# Patient Record
Sex: MALE | Race: BLACK OR AFRICAN AMERICAN | NOT HISPANIC OR LATINO | Employment: OTHER | URBAN - METROPOLITAN AREA
[De-identification: names, ages, dates, MRNs, and addresses within clinical notes are randomized per-mention and may not be internally consistent; named-entity substitution may affect disease eponyms.]

---

## 2017-01-11 ENCOUNTER — LAB VISIT (OUTPATIENT)
Dept: LAB | Facility: HOSPITAL | Age: 62
End: 2017-01-11
Payer: COMMERCIAL

## 2017-01-11 DIAGNOSIS — Z79.899 ENCOUNTER FOR LONG-TERM (CURRENT) USE OF MEDICATIONS: ICD-10-CM

## 2017-01-11 DIAGNOSIS — Z94.0 KIDNEY REPLACED BY TRANSPLANT: ICD-10-CM

## 2017-01-11 LAB
ALBUMIN SERPL BCP-MCNC: 3.6 G/DL
ANION GAP SERPL CALC-SCNC: 10 MMOL/L
BASOPHILS # BLD AUTO: 0.01 K/UL
BASOPHILS NFR BLD: 0.2 %
BUN SERPL-MCNC: 17 MG/DL
CALCIUM SERPL-MCNC: 9 MG/DL
CHLORIDE SERPL-SCNC: 105 MMOL/L
CO2 SERPL-SCNC: 26 MMOL/L
CREAT SERPL-MCNC: 1.5 MG/DL
DIFFERENTIAL METHOD: ABNORMAL
EOSINOPHIL # BLD AUTO: 0.1 K/UL
EOSINOPHIL NFR BLD: 0.9 %
ERYTHROCYTE [DISTWIDTH] IN BLOOD BY AUTOMATED COUNT: 13.5 %
EST. GFR  (AFRICAN AMERICAN): 56.5 ML/MIN/1.73 M^2
EST. GFR  (NON AFRICAN AMERICAN): 48.8 ML/MIN/1.73 M^2
GLUCOSE SERPL-MCNC: 186 MG/DL
HCT VFR BLD AUTO: 43.7 %
HGB BLD-MCNC: 14 G/DL
LYMPHOCYTES # BLD AUTO: 0.9 K/UL
LYMPHOCYTES NFR BLD: 15.6 %
MAGNESIUM SERPL-MCNC: 1.5 MG/DL
MCH RBC QN AUTO: 26.8 PG
MCHC RBC AUTO-ENTMCNC: 32 %
MCV RBC AUTO: 84 FL
MONOCYTES # BLD AUTO: 0.9 K/UL
MONOCYTES NFR BLD: 15.6 %
NEUTROPHILS # BLD AUTO: 3.7 K/UL
NEUTROPHILS NFR BLD: 67 %
PHOSPHATE SERPL-MCNC: 2.7 MG/DL
PLATELET # BLD AUTO: 189 K/UL
PMV BLD AUTO: 11.7 FL
POTASSIUM SERPL-SCNC: 4 MMOL/L
RBC # BLD AUTO: 5.23 M/UL
SODIUM SERPL-SCNC: 141 MMOL/L
WBC # BLD AUTO: 5.53 K/UL

## 2017-01-11 PROCEDURE — 80197 ASSAY OF TACROLIMUS: CPT

## 2017-01-11 PROCEDURE — 80069 RENAL FUNCTION PANEL: CPT

## 2017-01-11 PROCEDURE — 83735 ASSAY OF MAGNESIUM: CPT

## 2017-01-11 PROCEDURE — 85025 COMPLETE CBC W/AUTO DIFF WBC: CPT

## 2017-01-11 PROCEDURE — 36415 COLL VENOUS BLD VENIPUNCTURE: CPT | Mod: PO

## 2017-01-12 LAB — TACROLIMUS BLD-MCNC: 7.6 NG/ML

## 2017-01-13 RX ORDER — METOPROLOL SUCCINATE 25 MG/1
TABLET, EXTENDED RELEASE ORAL
Qty: 60 TABLET | Refills: 5 | OUTPATIENT
Start: 2017-01-13

## 2017-01-18 RX ORDER — METOPROLOL SUCCINATE 25 MG/1
50 TABLET, EXTENDED RELEASE ORAL DAILY
Qty: 30 TABLET | Refills: 11 | Status: SHIPPED | OUTPATIENT
Start: 2017-01-18 | End: 2017-02-21 | Stop reason: SDUPTHER

## 2017-01-18 NOTE — TELEPHONE ENCOUNTER
----- Message from Jackie Wheatley sent at 1/18/2017 10:40 AM CST -----  Contact: daughter Marybeth  Pt jonathan Rueda would like to speak to the Dr or nurse regarding getting a prescription filled for the pt,the pharmacy is saying the office has not called them back.Rx-metoprolol....[phone]      .  1    [facility] [place] [address] [address]  Phone: [phone] Fax: [phone]

## 2017-01-19 NOTE — TELEPHONE ENCOUNTER
----- Message from Mian Gallardo sent at 1/18/2017 11:11 AM CST -----  Contact: pt's daughter  She's calling in regards to a missed call, please advise, [phone] (cell)

## 2017-01-19 NOTE — TELEPHONE ENCOUNTER
S/w pt. Pt requested a refill of Metoprolol as listed in medcard. I advised that Dr. Ham sent refill to [facility] yesterday. verbalized understanding/TGD

## 2017-01-30 ENCOUNTER — OFFICE VISIT (OUTPATIENT)
Dept: UROLOGY | Facility: CLINIC | Age: 62
End: 2017-01-30
Payer: COMMERCIAL

## 2017-01-30 VITALS — WEIGHT: 268.06 LBS | BODY MASS INDEX: 41.99 KG/M2

## 2017-01-30 DIAGNOSIS — N40.0 BENIGN PROSTATIC HYPERPLASIA, PRESENCE OF LOWER URINARY TRACT SYMPTOMS UNSPECIFIED, UNSPECIFIED MORPHOLOGY: Primary | ICD-10-CM

## 2017-01-30 LAB
BILIRUB SERPL-MCNC: NORMAL MG/DL
BLOOD URINE, POC: 50
COLOR, POC UA: YELLOW
GLUCOSE UR QL STRIP: NORMAL
KETONES UR QL STRIP: NORMAL
LEUKOCYTE ESTERASE URINE, POC: NORMAL
NITRITE, POC UA: NORMAL
PH, POC UA: 5
POC RESIDUAL URINE VOLUME: 42 ML (ref 0–100)
PROTEIN, POC: NORMAL
SPECIFIC GRAVITY, POC UA: 1.01
UROBILINOGEN, POC UA: NORMAL

## 2017-01-30 PROCEDURE — 81002 URINALYSIS NONAUTO W/O SCOPE: CPT | Mod: S$GLB,,, | Performed by: UROLOGY

## 2017-01-30 PROCEDURE — 99244 OFF/OP CNSLTJ NEW/EST MOD 40: CPT | Mod: 25,S$GLB,, | Performed by: UROLOGY

## 2017-01-30 PROCEDURE — 99999 PR PBB SHADOW E&M-EST. PATIENT-LVL II: CPT | Mod: PBBFAC,,, | Performed by: UROLOGY

## 2017-01-30 PROCEDURE — 51798 US URINE CAPACITY MEASURE: CPT | Mod: S$GLB,,, | Performed by: UROLOGY

## 2017-01-30 RX ORDER — BISACODYL 5 MG
5 TABLET, DELAYED RELEASE (ENTERIC COATED) ORAL DAILY PRN
COMMUNITY
End: 2022-03-20

## 2017-01-30 RX ORDER — TAMSULOSIN HYDROCHLORIDE 0.4 MG/1
0.4 CAPSULE ORAL DAILY
Qty: 30 CAPSULE | Refills: 11 | Status: SHIPPED | OUTPATIENT
Start: 2017-01-30 | End: 2017-03-06 | Stop reason: SDUPTHER

## 2017-01-30 NOTE — LETTER
January 30, 2017      Hany Gonsalez MD  [address] [facility] [address]           [facility]  [address] [place] [address]  [place] [address]  Phone: [phone]  Fax: [phone]          Patient: Mitch Whittaker   MR Number: [id number]   YOB: 1955   Date of Visit: 1/30/2017       Dear Dr. Hany Gonsalez:    Thank you for referring Mitch Whittaker to me for evaluation. Attached you will find relevant portions of my assessment and plan of care.    If you have questions, please do not hesitate to call me. I look forward to following Mitch Whittaker along with you.    Sincerely,    Sal Gallagher IV, MD    Enclosure  CC:  No Recipients    If you would like to receive this communication electronically, please contact [email or web address] or [phone] to request more information on [employer] access.    For providers and/or their staff who would like to refer a patient to [facility], please contact us through our one-stop-shop provider referral line, [facility] , at [phone].    If you feel you have received this communication in error or would no longer like to receive these types of communications, please e-mail [email or web address]

## 2017-01-30 NOTE — MR AVS SNAPSHOT
[facility]  [address] [facility] [address]  Phone: [phone]  Fax: [phone]                  Mitch Whittaker   2017 8:20 AM   Office Visit    Description:  Male : 1955   Provider:  Sal Gallagher IV, MD   Department:  [facility]           Diagnoses this Visit        Comments    Benign prostatic hyperplasia, presence of lower urinary tract symptoms unspecified, unspecified morphology    -  Primary            To Do List           Future Appointments        Provider Department Dept Phone    2017 8:00 AM SPECIMEN, [facility] Specimen [facility] [phone]    2017 9:40 AM LABORATORY, [facility] - Laboratory [phone]    2017 8:20 AM Cornelio Ham MD [facility] [phone]    3/6/2017 9:40 AM Sal Gallagher IV, MD [facility] [phone]      Goals (5 Years of Data)     None       These Medications        Disp Refills Start End    tamsulosin (FLOMAX) 0.4 mg Cp24 30 capsule 11 2017    Take 1 capsule (0.4 mg total) by mouth once daily. - Oral    Pharmacy: [facility] [address] #: [phone]         [facility]     [facility] Care [facility] -  Assistance  Registered nurses in the [facility] provide clinical advisement, health education, appointment booking, and other advisory services.  Call for this free service at [phone].             Medications           Message regarding Medications     Verify the changes and/or additions to your medication regime listed below are the same as discussed with your clinician today.  If any of these changes or additions are incorrect, please notify your healthcare provider.        START taking these NEW medications        Refills    tamsulosin (FLOMAX) 0.4 mg Cp24 11    Sig: Take 1 capsule (0.4 mg total) by mouth once daily.    Class: Normal    Route: Oral            Verify that the below list of medications is an accurate representation of the medications you are currently taking.  If none reported, the list may be blank. If incorrect, please contact your healthcare provider. Carry this list with you in case of emergency.           Current Medications     amlodipine (NORVASC) 2.5 MG tablet Take 1 tablet (2.5 mg total) by mouth once daily.    aspirin (ECOTRIN) 81 MG EC tablet Take 1 tablet by mouth Daily.     bisacodyl (DULCOLAX) 5 mg EC tablet Take 5 mg by mouth daily as needed for Constipation.    blood sugar diagnostic Strp 1 strip by Misc.(Non-Drug; Combo Route) route as directed. Use to check BG 2-3 times daily. Contour next strips    ergocalciferol (VITAMIN D2) 50,000 unit Cap TAKE 1 CAPSULE BY MOUTH EVERY 7 DAYS.    famotidine (PEPCID) 20 MG tablet Take 1 tablet (20 mg total) by mouth every evening.    glimepiride (AMARYL) 2 MG tablet Take 1 tablet (2 mg total) by mouth before breakfast.    insulin aspart (NOVOLOG FLEXPEN) 100 unit/mL InPn pen 15 plus: 180-230=2, 231-280=4, 281-330=6, 331-380=8, >380=10 with each meal Min15- 20 units TID    insulin glargine (LANTUS SOLOSTAR) 100 unit/mL (3 mL) InPn pen Inject 20 Units into the skin 2 (two) times daily.    ketoconazole (NIZORAL) 200 mg Tab TAKE 1/2 TABLET BY MOUTH ONCE DAILY    lancets 33 gauge Misc 1 Stick by Misc.(Non-Drug; Combo Route) route as directed. Contour lancets. Use to check BG 2-3 times daily.    magnesium oxide (MAG-OX) 400 mg tablet Take 2 tablets (800 mg total) by mouth 2 (two) times daily.    metoprolol succinate (TOPROL-XL) 25 MG 24 hr tablet Take 2 tablets (50 mg total) by mouth once daily.    multivitamin (THERAGRAN) tablet Take 1 tablet by mouth once daily.    mycophenolate (CELLCEPT) 250 mg Cap Take 3 capsules (750 mg total) by mouth 2 (two) times daily.    omega-3 fatty acids-vitamin E (FISH OIL) 1,000 mg Cap Take 1 capsule by mouth once daily.     pen needle, diabetic (BD INSULIN PEN NEEDLE UF  "SHORT) 31 gauge x 5/16" Ndle USE TO INJECT INSULIN TWICE A DAY    predniSONE (DELTASONE) 5 MG tablet Take 1 tablet (5 mg total) by mouth once daily. Z94.0    rosuvastatin (CRESTOR) 20 MG tablet Take 1 tablet (20 mg total) by mouth every evening.    tacrolimus (PROGRAF) 1 MG Cap 5mg in AM 4mg in PMKIDNEY TRANSPLANT 6-12-15 z94.0    tamsulosin (FLOMAX) 0.4 mg Cp24 Take 1 capsule (0.4 mg total) by mouth once daily.           Clinical Reference Information           Vital Signs - Last Recorded  Most recent update: 1/30/2017  8:49 AM by Michelle Milligan LPN    Wt BMI             121.6 kg (268 lb 1.3 oz) 41.99 kg/m2         Allergies as of 1/30/2017     Lisinopril    Actos  [Pioglitazone]    Metformin      Immunizations Administered on Date of Encounter - 1/30/2017     None      Orders Placed During Today's Visit      Normal Orders This Visit    POCT urine dipstick without microscope       Maintenance Dialysis History     Start End Type Comments Center    7/9/2013  [facility]            Current Dialysis Center Information     [facility] [address] Phone #:  [phone]    Contact:  N/A [place]Z  [address] Fax #:  [phone]            Transplant Information        Txp Date Organ Coordinator Care Team    6/12/2015 Kidney Sean Lobato RN Referring Physician:  Bucky Danielle MD   Current Nephrologist:  Bucky Danielle MD   Surgeon:  Carlota Connors MD   Assisting Surgeon:  Magda Pereira MD         "

## 2017-01-30 NOTE — PROGRESS NOTES
Chief Complaint: BPH?    HPI:   1/30/17: 62 yo man had a kidney transplant 6/15 and the stent was removed 3 weeks later; he was told his bladder was really full.  No abd/pelvic pain and no exac/rel factors.  No hematuria.  No urolithiasis.  Weak stream, no hesitancy, doesn't feel he empties all the way, nocturia x8-9.  Is supposed to use a CPAP but it got flooded out.  No other urinary bother. Nocturia x4-5 on CPAP; drinks a lot of water including in the evening.  Referred by Dr. Gonsalez.    Allergies:  Lisinopril; Actos  [pioglitazone]; and Metformin    Medications:  has a current medication list which includes the following prescription(s): amlodipine, aspirin, bisacodyl, blood sugar diagnostic, ergocalciferol, famotidine, glimepiride, insulin aspart, insulin glargine, ketoconazole, lancets, magnesium oxide, metoprolol succinate, multivitamin, mycophenolate, omega-3 fatty acids-vitamin e, pen needle, diabetic, prednisone, rosuvastatin, tacrolimus, and humalog kwikpen.    Review of Systems:  General: No fever, chills, fatigability, or weight loss.  Skin: No rashes, itching, or changes in color or texture of skin.  Chest: Denies STEELE, cyanosis, wheezing, cough, and sputum production.  Abdomen: Appetite fine. No weight loss. Denies diarrhea, abdominal pain, hematemesis, or blood in stool.  Musculoskeletal: No joint stiffness or swelling. Denies back pain.  : As above.  All other review of systems negative.    PMH:   has a past medical history of Acquired renal cyst of left kidney; Anemia associated with chronic renal failure; CAD (coronary artery disease); CHF (congestive heart failure); Chronic immunosuppression with Prograf and MMF (6/18/2015); CKD (chronic kidney disease) stage 3, GFR 30-59 ml/min; Diabetic retinopathy; DM (diabetes mellitus), type 2 with complications (1994); Edema; End stage kidney disease; False positive serological test for hepatitis C; Gallbladder polyp; Heart failure, diastolic, due to HTN;  Hemodialysis status; HPTH (hyperparathyroidism); Hyperlipidemia; Hypertension associated with stage 3 chronic kidney disease due to type 2 diabetes mellitus; Obesity; Proteinuria; S/P kidney transplant; Type 2 diabetes, uncontrolled, with retinopathy; and Type II diabetes mellitus with renal manifestations.    PSH:   has a past surgical history that includes Retinal laser procedure; Cardiac catheterization (2008); and Kidney transplant.    FamHx: family history includes Diabetes in his maternal grandmother, mother, and sister; Heart failure in his mother; Hypertension in his mother; Kidney disease in his sister. There is no history of Cancer.    SocHx:  reports that he has never smoked. He quit smokeless tobacco use about 3 years ago. He reports that he uses illicit drugs, including Marijuana, about twice per week. He reports that he does not drink alcohol.      Physical Exam:  There were no vitals filed for this visit.  General: A&Ox3, no apparent distress, no deformities  Neck: No masses, normal thyroid  Lungs: normal inspiration, no use of accessory muscles  Heart: normal pulse, no arrhythmias  Abdomen: Soft, NT, ND, no masses, no hernias, no hepatosplenomegaly  Lymphatic: Neck and groin nodes negative  Skin: The skin is warm and dry. No jaundice.  Ext: No c/c/e.  : Test desc jamie, no abnormalities of epididymus. Penis normal, with normal penile and scrotal skin. Meatus normal. Normal rectal tone, no hemorrhoids. Prost 40 gm no nodules or masses appreciated. SV not palpable. Perineum and anus normal.    Labs/Studies:   Urinalysis performed in clinic, summary: UA normal exc 50 glucose  Bladder Scan performed in office: PVR 42 ml.  PSA    12/16: 0.26    Impression/Plan:   1. Has symptomatic BPH.  Flomax and RTC 1 mo for PVR.

## 2017-02-13 DIAGNOSIS — Z94.0 KIDNEY REPLACED BY TRANSPLANT: Primary | ICD-10-CM

## 2017-02-13 RX ORDER — KETOCONAZOLE 200 MG/1
100 TABLET ORAL DAILY
Qty: 15 TABLET | Refills: 11 | Status: SHIPPED | OUTPATIENT
Start: 2017-02-13 | End: 2018-02-07 | Stop reason: SDUPTHER

## 2017-02-21 ENCOUNTER — TELEPHONE (OUTPATIENT)
Dept: INTERNAL MEDICINE | Facility: CLINIC | Age: 62
End: 2017-02-21

## 2017-02-21 RX ORDER — METOPROLOL SUCCINATE 25 MG/1
50 TABLET, EXTENDED RELEASE ORAL DAILY
Qty: 60 TABLET | Refills: 1 | Status: SHIPPED | OUTPATIENT
Start: 2017-02-21 | End: 2017-04-23 | Stop reason: SDUPTHER

## 2017-02-21 NOTE — TELEPHONE ENCOUNTER
----- Message from Andrew Cosby, PharmD sent at 2/21/2017  4:47 PM CST -----  Pt is requesting that his toprol xl 25 mg be sent in with a quantity of 60, so that the rx will last him a month.     Thanks,    Andrew

## 2017-02-24 NOTE — TELEPHONE ENCOUNTER
Called pt.  Informed him of medication refill.  That an appointment is needed.  Pt voiced understanding and scheduled lab and follow up appointment with MD.

## 2017-02-27 ENCOUNTER — LAB VISIT (OUTPATIENT)
Dept: LAB | Facility: HOSPITAL | Age: 62
End: 2017-02-27
Attending: INTERNAL MEDICINE
Payer: COMMERCIAL

## 2017-02-27 DIAGNOSIS — E11.22 TYPE 2 DIABETES MELLITUS WITH CHRONIC KIDNEY DISEASE, WITH LONG-TERM CURRENT USE OF INSULIN, UNSPECIFIED CKD STAGE: ICD-10-CM

## 2017-02-27 DIAGNOSIS — Z12.5 SCREENING FOR PROSTATE CANCER: ICD-10-CM

## 2017-02-27 DIAGNOSIS — Z79.4 TYPE 2 DIABETES MELLITUS WITH CHRONIC KIDNEY DISEASE, WITH LONG-TERM CURRENT USE OF INSULIN, UNSPECIFIED CKD STAGE: ICD-10-CM

## 2017-02-27 LAB
ALBUMIN SERPL BCP-MCNC: 3.6 G/DL
ALP SERPL-CCNC: 83 U/L
ALT SERPL W/O P-5'-P-CCNC: 24 U/L
ANION GAP SERPL CALC-SCNC: 11 MMOL/L
AST SERPL-CCNC: 20 U/L
BASOPHILS # BLD AUTO: 0.01 K/UL
BASOPHILS NFR BLD: 0.2 %
BILIRUB SERPL-MCNC: 0.6 MG/DL
BUN SERPL-MCNC: 18 MG/DL
CALCIUM SERPL-MCNC: 9.2 MG/DL
CHLORIDE SERPL-SCNC: 104 MMOL/L
CHOLEST/HDLC SERPL: 4 {RATIO}
CO2 SERPL-SCNC: 25 MMOL/L
COMPLEXED PSA SERPL-MCNC: 0.38 NG/ML
CREAT SERPL-MCNC: 1.7 MG/DL
DIFFERENTIAL METHOD: ABNORMAL
EOSINOPHIL # BLD AUTO: 0 K/UL
EOSINOPHIL NFR BLD: 0.8 %
ERYTHROCYTE [DISTWIDTH] IN BLOOD BY AUTOMATED COUNT: 13.5 %
EST. GFR  (AFRICAN AMERICAN): 48.5 ML/MIN/1.73 M^2
EST. GFR  (NON AFRICAN AMERICAN): 42 ML/MIN/1.73 M^2
GLUCOSE SERPL-MCNC: 215 MG/DL
HCT VFR BLD AUTO: 43.7 %
HDL/CHOLESTEROL RATIO: 24.8 %
HDLC SERPL-MCNC: 210 MG/DL
HDLC SERPL-MCNC: 52 MG/DL
HGB BLD-MCNC: 14.1 G/DL
LDLC SERPL CALC-MCNC: 120.8 MG/DL
LYMPHOCYTES # BLD AUTO: 0.9 K/UL
LYMPHOCYTES NFR BLD: 18.6 %
MCH RBC QN AUTO: 26.7 PG
MCHC RBC AUTO-ENTMCNC: 32.3 %
MCV RBC AUTO: 83 FL
MONOCYTES # BLD AUTO: 0.7 K/UL
MONOCYTES NFR BLD: 14 %
NEUTROPHILS # BLD AUTO: 3.3 K/UL
NEUTROPHILS NFR BLD: 65.4 %
NONHDLC SERPL-MCNC: 158 MG/DL
PLATELET # BLD AUTO: 180 K/UL
PMV BLD AUTO: 11.5 FL
POTASSIUM SERPL-SCNC: 4 MMOL/L
PROT SERPL-MCNC: 6.8 G/DL
RBC # BLD AUTO: 5.29 M/UL
SODIUM SERPL-SCNC: 140 MMOL/L
TRIGL SERPL-MCNC: 186 MG/DL
TSH SERPL DL<=0.005 MIU/L-ACNC: 0.91 UIU/ML
WBC # BLD AUTO: 5.06 K/UL

## 2017-02-27 PROCEDURE — 84443 ASSAY THYROID STIM HORMONE: CPT

## 2017-02-27 PROCEDURE — 36415 COLL VENOUS BLD VENIPUNCTURE: CPT | Mod: PO

## 2017-02-27 PROCEDURE — 80053 COMPREHEN METABOLIC PANEL: CPT

## 2017-02-27 PROCEDURE — 80061 LIPID PANEL: CPT

## 2017-02-27 PROCEDURE — 84153 ASSAY OF PSA TOTAL: CPT

## 2017-02-27 PROCEDURE — 85025 COMPLETE CBC W/AUTO DIFF WBC: CPT

## 2017-03-01 RX ORDER — FAMOTIDINE 20 MG/1
20 TABLET, FILM COATED ORAL NIGHTLY
Qty: 30 TABLET | Refills: 11 | Status: SHIPPED | OUTPATIENT
Start: 2017-03-01 | End: 2018-03-02 | Stop reason: SDUPTHER

## 2017-03-01 NOTE — TELEPHONE ENCOUNTER
----- Message from Andrew Cosby, PharmD sent at 3/1/2017  9:45 AM CST -----  Pt is requesting a new rx for his famotidine 20 mg.    Thanks,    Andrew

## 2017-03-03 ENCOUNTER — OFFICE VISIT (OUTPATIENT)
Dept: INTERNAL MEDICINE | Facility: CLINIC | Age: 62
End: 2017-03-03
Payer: COMMERCIAL

## 2017-03-03 VITALS
SYSTOLIC BLOOD PRESSURE: 136 MMHG | OXYGEN SATURATION: 97 % | HEIGHT: 67 IN | DIASTOLIC BLOOD PRESSURE: 76 MMHG | BODY MASS INDEX: 42.93 KG/M2 | WEIGHT: 273.56 LBS | HEART RATE: 88 BPM | TEMPERATURE: 98 F

## 2017-03-03 DIAGNOSIS — I25.10 CORONARY ARTERY DISEASE INVOLVING NATIVE CORONARY ARTERY OF NATIVE HEART WITHOUT ANGINA PECTORIS: ICD-10-CM

## 2017-03-03 DIAGNOSIS — E11.42 DIABETIC PERIPHERAL NEUROPATHY: ICD-10-CM

## 2017-03-03 DIAGNOSIS — N18.9 ANEMIA ASSOCIATED WITH CHRONIC RENAL FAILURE: ICD-10-CM

## 2017-03-03 DIAGNOSIS — N18.30 HYPERTENSION ASSOCIATED WITH STAGE 3 CHRONIC KIDNEY DISEASE DUE TO TYPE 2 DIABETES MELLITUS: ICD-10-CM

## 2017-03-03 DIAGNOSIS — E11.21 TYPE 2 DIABETES MELLITUS WITH DIABETIC NEPHROPATHY, UNSPECIFIED LONG TERM INSULIN USE STATUS: ICD-10-CM

## 2017-03-03 DIAGNOSIS — Z00.00 ROUTINE GENERAL MEDICAL EXAMINATION AT A HEALTH CARE FACILITY: Primary | ICD-10-CM

## 2017-03-03 DIAGNOSIS — G47.33 OBSTRUCTIVE SLEEP APNEA SYNDROME: ICD-10-CM

## 2017-03-03 DIAGNOSIS — N25.81 SECONDARY HYPERPARATHYROIDISM OF RENAL ORIGIN: ICD-10-CM

## 2017-03-03 DIAGNOSIS — E11.22 HYPERTENSION ASSOCIATED WITH STAGE 3 CHRONIC KIDNEY DISEASE DUE TO TYPE 2 DIABETES MELLITUS: ICD-10-CM

## 2017-03-03 DIAGNOSIS — E11.21 TYPE II DIABETES MELLITUS WITH NEPHROPATHY: ICD-10-CM

## 2017-03-03 DIAGNOSIS — R76.8 HEPATITIS C ANTIBODY TEST POSITIVE: ICD-10-CM

## 2017-03-03 DIAGNOSIS — E11.8 TYPE 2 DIABETES MELLITUS WITH COMPLICATION, WITH LONG-TERM CURRENT USE OF INSULIN: ICD-10-CM

## 2017-03-03 DIAGNOSIS — I12.9 HYPERTENSION ASSOCIATED WITH STAGE 3 CHRONIC KIDNEY DISEASE DUE TO TYPE 2 DIABETES MELLITUS: ICD-10-CM

## 2017-03-03 DIAGNOSIS — R76.8 HEPATITIS C ANTIBODY POSITIVE IN BLOOD: ICD-10-CM

## 2017-03-03 DIAGNOSIS — Z79.4 TYPE 2 DIABETES MELLITUS WITH COMPLICATION, WITH LONG-TERM CURRENT USE OF INSULIN: ICD-10-CM

## 2017-03-03 DIAGNOSIS — Z94.0 KIDNEY TRANSPLANT STATUS, LIVING RELATED DONOR: Chronic | ICD-10-CM

## 2017-03-03 DIAGNOSIS — N18.30 CKD (CHRONIC KIDNEY DISEASE) STAGE 3, GFR 30-59 ML/MIN: ICD-10-CM

## 2017-03-03 DIAGNOSIS — D63.1 ANEMIA ASSOCIATED WITH CHRONIC RENAL FAILURE: ICD-10-CM

## 2017-03-03 PROCEDURE — 99396 PREV VISIT EST AGE 40-64: CPT | Mod: S$GLB,,, | Performed by: INTERNAL MEDICINE

## 2017-03-03 PROCEDURE — 3078F DIAST BP <80 MM HG: CPT | Mod: S$GLB,,, | Performed by: INTERNAL MEDICINE

## 2017-03-03 PROCEDURE — 99999 PR PBB SHADOW E&M-EST. PATIENT-LVL III: CPT | Mod: PBBFAC,,, | Performed by: INTERNAL MEDICINE

## 2017-03-03 PROCEDURE — 3075F SYST BP GE 130 - 139MM HG: CPT | Mod: S$GLB,,, | Performed by: INTERNAL MEDICINE

## 2017-03-03 RX ORDER — INSULIN GLARGINE 100 [IU]/ML
30 INJECTION, SOLUTION SUBCUTANEOUS 2 TIMES DAILY
Qty: 2 BOX | Refills: 11 | Status: SHIPPED | OUTPATIENT
Start: 2017-03-03 | End: 2017-05-19 | Stop reason: SDUPTHER

## 2017-03-03 RX ORDER — ROSUVASTATIN CALCIUM 40 MG/1
40 TABLET, COATED ORAL NIGHTLY
Qty: 90 TABLET | Refills: 3 | Status: SHIPPED | OUTPATIENT
Start: 2017-03-03 | End: 2018-03-02 | Stop reason: SDUPTHER

## 2017-03-03 RX ORDER — INSULIN ASPART 100 [IU]/ML
INJECTION, SOLUTION INTRAVENOUS; SUBCUTANEOUS
Qty: 1 BOX | Refills: 11 | Status: SHIPPED | OUTPATIENT
Start: 2017-03-03 | End: 2017-04-25 | Stop reason: SDUPTHER

## 2017-03-03 NOTE — PROGRESS NOTES
HPI:  Patient is a 61-year-old man who comes in today for follow-up his diabetes, hypertension, lipids, and chronic kidney disease as well as for his annual physical exam.  He states his sugars have been doing well.  He denies any hypoglycemic problems.  Unfortunately, his lab work, says otherwise.  His hemoglobin A1c and royer rocketed to 10.2.  He denies any other problems or complaints.  His blood pressures been well-controlled.  He denies any chest pains or shortness of breath.      Current MEDS: medcard review, verified and update  Allergies: Per the electronic medical record    Past Medical History:   Diagnosis Date    Acquired renal cyst of left kidney     Anemia associated with chronic renal failure     CAD (coronary artery disease)     nonobstructive lhc 9/14    CHF (congestive heart failure)     Chronic immunosuppression with Prograf and MMF 6/18/2015    CKD (chronic kidney disease) stage 3, GFR 30-59 ml/min     Diabetic retinopathy     DM (diabetes mellitus), type 2 with complications 1994    Edema     End stage kidney disease     s/p transplant, doing well    Gallbladder polyp     Heart failure, diastolic, due to HTN     Hemodialysis status     off since transplant    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     HPTH (hyperparathyroidism)     Hyperlipidemia     Hypertension associated with stage 3 chronic kidney disease due to type 2 diabetes mellitus     Obesity     Proteinuria     resolved s/p transplant    S/P kidney transplant     Type 2 diabetes, uncontrolled, with retinopathy     Type II diabetes mellitus with renal manifestations        Past Surgical History:   Procedure Laterality Date    CARDIAC CATHETERIZATION  2008    normal coronary    KIDNEY TRANSPLANT      RETINAL LASER PROCEDURE         SHx: per the electronic medical record    FHx: recorded in the electronic medical record    ROS:    denies any chest pains or shortness of breath.  "Denies any nausea, vomiting or diarrhea. Denies any fever, chills or sweats. Denies any change in weight, voice, stool, skin or hair. Denies any dysuria, dyspepsia or dysphagia. Denies any change in vision, hearing or headaches. Denies any swollen lymph nodes or loss of memory.    PE:  /76  Pulse 88  Temp 97.7 °F (36.5 °C)  Ht 5' 7" (1.702 m)  Wt 124.1 kg (273 lb 9.5 oz)  SpO2 97%  BMI 42.85 kg/m2  Gen: Well-developed, well-nourished, male, in no acute distress, oriented x3  HEENT: neck is supple, no adenopathy, carotids 2+ equal without bruits, thyroid exam normal size without nodules.  CHEST: clear to auscultation and percussion  CVS: regular rate and rhythm without significant murmur, gallop, or rubs  ABD: soft, benign, no rebound no guarding, no distention.  Bowel sounds are normal.     nontender.  No palpable masses.  No organomegaly and no audible bruits.  RECTAL: Deferred  EXT: no clubbing, cyanosis, or edema  LYMPH: no cervical, inguinal, or axillary adenopathy  FEET: no loss of sensation.  No ulcers or pressure sores.  Monofilament testing normal  NEURO: gait normal.  Cranial nerves II- XII intact. No nystagmus.  Speech normal.   Gross motor and sensory unremarkable.    Lab Results   Component Value Date    WBC 5.06 02/27/2017    HGB 14.1 02/27/2017    HCT 43.7 02/27/2017     02/27/2017    CHOL 210 (H) 02/27/2017    TRIG 186 (H) 02/27/2017    HDL 52 02/27/2017    ALT 24 02/27/2017    AST 20 02/27/2017     02/27/2017    K 4.0 02/27/2017     02/27/2017    CREATININE 1.7 (H) 02/27/2017    BUN 18 02/27/2017    CO2 25 02/27/2017    TSH 0.911 02/27/2017    PSA 0.38 02/27/2017    INR 1.0 06/18/2015    HGBA1C 10.2 (H) 02/27/2017       Impression:  Diabetes, very poorly controlled  Hyperlipidemia, not to goal  Patient Active Problem List   Diagnosis    Anemia associated with chronic renal failure    Obesity    Acquired renal cyst of left kidney    Nephrolithiasis    Sleep apnea "    Proliferative diabetic retinopathy(362.02)    Pseudophakia    CAD (coronary artery disease)    Living-related donor kidney transplant (daughter) - 6/12/15    Diabetic peripheral neuropathy    Chronic immunosuppression with Prograf, MMF and prednisone    Secondary hyperparathyroidism of renal origin    CKD (chronic kidney disease) stage 3, GFR 30-59 ml/min    Type II diabetes mellitus with renal manifestations    Type 2 diabetes, uncontrolled, with retinopathy    Hypertension associated with stage 3 chronic kidney disease due to type 2 diabetes mellitus    Right sided abdominal pain    Constipation, unspecified    Nocturia    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    DM (diabetes mellitus), type 2 with complications       Plan:   Orders Placed This Encounter    Hemoglobin A1c    Lipid panel    Basic metabolic panel    Ambulatory Referral to Ophthalmology    insulin glargine (LANTUS SOLOSTAR) 100 unit/mL (3 mL) InPn pen    insulin aspart (NOVOLOG FLEXPEN) 100 unit/mL InPn pen    rosuvastatin (CRESTOR) 40 MG Tab     He will increase his Lantus insulin to 30 units twice a day.  He will take his NovoLog insulin with each meal per sliding scale.  The patient has been skipping even mealtime insulin whenever his sugar was in the normal range.  I explained to him  He still has to give the 15 units even when his sugar is normal.  If his sugars less than 70.  He may eat first, but still get the 15 units.  He will see me in 3 months with the above lab work.  He is due to see an eye exam

## 2017-03-03 NOTE — MR AVS SNAPSHOT
[facility] - [facility]  [address] [place] [address]  Phone: [phone]                  Mitch Whittaker   3/3/2017 9:20 AM   Office Visit    Description:  Male : 1955   Provider:  Cornelio Ham MD   Department:  [facility]           Reason for Visit     6 month follow up           Diagnoses this Visit        Comments    Routine general medical examination at a health care facility    -  Primary     Diabetic peripheral neuropathy         Hypertension associated with stage 3 chronic kidney disease due to type 2 diabetes mellitus         Type 2 diabetes mellitus with diabetic nephropathy, unspecified long term insulin use status         Kidney transplant status, living related donor         Coronary artery disease involving native coronary artery of native heart without angina pectoris         CKD (chronic kidney disease) stage 3, GFR 30-59 ml/min         Secondary hyperparathyroidism of renal origin         Anemia associated with chronic renal failure         Obstructive sleep apnea syndrome         Hepatitis C antibody test positive         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         Type 2 diabetes mellitus with complication, with long-term current use of insulin         Type II diabetes mellitus with nephropathy                To Do List           Future Appointments        Provider Department Dept Phone    3/6/2017 9:40 AM Sal Gallagher IV, MD [facility] - Urology [phone]    2017 8:30 AM LABORATORY, [facility] - Laboratory [phone]      Goals (5 Years of Data)     None      Follow-Up and Disposition     Return in about 3 months (around 6/3/2017).       These Medications        Disp Refills Start End    insulin glargine (LANTUS SOLOSTAR) 100 unit/mL (3 mL) InPn pen 2 Box 11 3/3/2017     Inject 30 Units into the skin 2 (two) times daily. - Subcutaneous    Pharmacy: [facility] - [place], [place] - [address]  [facility] Ph #: [phone]       insulin aspart (NOVOLOG FLEXPEN) 100 unit/mL InPn pen 1 Box 11 3/3/2017     15 plus: 180-230=2, 231-280=4, 281-330=6, 331-380=8, >380=10 with each meal Min15- 20 units TID    Pharmacy: [facility] - [place], [place] - [address] [facility] Ph #: [phone]       rosuvastatin (CRESTOR) 40 MG Tab 90 tablet 3 3/3/2017 3/3/2018    Take 1 tablet (40 mg total) by mouth every evening. - Oral    Pharmacy: [facility] - [place], [place] - [address] [facility] Ph #: [phone]         [facility] On Call     [facility] On [facility] Nurse Care Line - 24/7 Assistance  Registered nurses in the [facility] Center provide clinical advisement, health education, appointment booking, and other advisory services.  Call for this free service at [phone].             Medications           Message regarding Medications     Verify the changes and/or additions to your medication regime listed below are the same as discussed with your clinician today.  If any of these changes or additions are incorrect, please notify your healthcare provider.        START taking these NEW medications        Refills    rosuvastatin (CRESTOR) 40 MG Tab 3    Sig: Take 1 tablet (40 mg total) by mouth every evening.    Class: Normal    Route: Oral      CHANGE how you are taking these medications     Start Taking Instead of    insulin glargine (LANTUS SOLOSTAR) 100 unit/mL (3 mL) InPn pen insulin glargine (LANTUS SOLOSTAR) 100 unit/mL (3 mL) InPn pen    Dosage:  Inject 30 Units into the skin 2 (two) times daily. Dosage:  Inject 20 Units into the skin 2 (two) times daily.    Reason for Change:  Reorder            Verify that the below list of medications is an accurate representation of the medications you are currently taking.  If none reported, the list may be blank. If incorrect, please contact your healthcare provider. Carry this list with you in case of emergency.     "       Current Medications     amlodipine (NORVASC) 2.5 MG tablet Take 1 tablet (2.5 mg total) by mouth once daily.    aspirin (ECOTRIN) 81 MG EC tablet Take 1 tablet by mouth Daily.     bisacodyl (DULCOLAX) 5 mg EC tablet Take 5 mg by mouth daily as needed for Constipation.    blood sugar diagnostic Strp 1 strip by Misc.(Non-Drug; Combo Route) route as directed. Use to check BG 2-3 times daily. Contour next strips    ergocalciferol (VITAMIN D2) 50,000 unit Cap TAKE 1 CAPSULE BY MOUTH EVERY 7 DAYS.    famotidine (PEPCID) 20 MG tablet Take 1 tablet (20 mg total) by mouth every evening.    glimepiride (AMARYL) 2 MG tablet Take 1 tablet (2 mg total) by mouth before breakfast.    insulin aspart (NOVOLOG FLEXPEN) 100 unit/mL InPn pen 15 plus: 180-230=2, 231-280=4, 281-330=6, 331-380=8, >380=10 with each meal Min15- 20 units TID    insulin glargine (LANTUS SOLOSTAR) 100 unit/mL (3 mL) InPn pen Inject 30 Units into the skin 2 (two) times daily.    ketoconazole (NIZORAL) 200 mg Tab Take 0.5 tablets (100 mg total) by mouth once daily.    lancets 33 gauge Misc 1 Stick by Misc.(Non-Drug; Combo Route) route as directed. Contour lancets. Use to check BG 2-3 times daily.    magnesium oxide (MAG-OX) 400 mg tablet Take 2 tablets (800 mg total) by mouth 2 (two) times daily.    metoprolol succinate (TOPROL-XL) 25 MG 24 hr tablet Take 2 tablets (50 mg total) by mouth once daily.    multivitamin (THERAGRAN) tablet Take 1 tablet by mouth once daily.    mycophenolate (CELLCEPT) 250 mg Cap Take 3 capsules (750 mg total) by mouth 2 (two) times daily.    omega-3 fatty acids-vitamin E (FISH OIL) 1,000 mg Cap Take 1 capsule by mouth once daily.     pen needle, diabetic (BD INSULIN PEN NEEDLE UF SHORT) 31 gauge x 5/16" Ndle USE TO INJECT INSULIN TWICE A DAY    predniSONE (DELTASONE) 5 MG tablet Take 1 tablet (5 mg total) by mouth once daily. Z94.0    tacrolimus (PROGRAF) 1 MG Cap 5mg in AM 4mg in PMKIDNEY TRANSPLANT 6-12-15 z94.0    tamsulosin " "(FLOMAX) 0.4 mg Cp24 Take 1 capsule (0.4 mg total) by mouth once daily.    rosuvastatin (CRESTOR) 40 MG Tab Take 1 tablet (40 mg total) by mouth every evening.           Clinical Reference Information           Your Vitals Were     BP Pulse Temp Height Weight SpO2    136/76 88 97.7 °F (36.5 °C) 5' 7" (1.702 m) 124.1 kg (273 lb 9.5 oz) 97%    BMI                42.85 kg/m2          Blood Pressure          Most Recent Value    BP  136/76      Allergies as of 3/3/2017     Lisinopril    Actos  [Pioglitazone]    Metformin      Immunizations Administered on Date of Encounter - 3/3/2017     None      Orders Placed During Today's Visit      Normal Orders This Visit    Ambulatory Referral to Ophthalmology     Future Labs/Procedures Expected by Expires    Basic metabolic panel  6/1/2017 (Approximate) 5/2/2018    Hemoglobin A1c  6/1/2017 (Approximate) 5/2/2018    Lipid panel  6/1/2017 (Approximate) 5/2/2018      Maintenance Dialysis History     Start End Type Comments Center    7/9/2013  [facility]            Current Dialysis Center Information     [facility] [address] [facility] Phone #:  [phone]    Contact:  [place]  [address] Fax #:  [phone]            Transplant Information        Txp Date Organ Coordinator Care Team    6/12/2015 Kidney Sean Lobato RN Referring Physician:  Bucky Danielle MD   Current Nephrologist:  Bucky Danielle MD   Surgeon:  Carlota Connors MD   Assisting Surgeon:  Magda Pereira MD         Language Assistance Services     ATTENTION: Language assistance services are available, free of charge. Please call [phone].      ATENCIÓN: Si habla español, tiene a schumacher disposición servicios gratuitos de asistencia lingüística. Llame al [phone].     CHÚ Ý: N?u b?n nói Ti?ng Vi?t, có các d?ch v? h? tr? ngôn ng? mi?n phí dành cho b?n. G?i s? [phone].         [facility] complies with applicable Federal civil rights laws and does not " discriminate on the basis of race, color, national origin, age, disability, or sex.

## 2017-03-06 ENCOUNTER — OFFICE VISIT (OUTPATIENT)
Dept: UROLOGY | Facility: CLINIC | Age: 62
End: 2017-03-06
Payer: COMMERCIAL

## 2017-03-06 VITALS — HEIGHT: 67 IN | BODY MASS INDEX: 42.57 KG/M2 | WEIGHT: 271.25 LBS

## 2017-03-06 DIAGNOSIS — N40.0 BENIGN PROSTATIC HYPERPLASIA, PRESENCE OF LOWER URINARY TRACT SYMPTOMS UNSPECIFIED, UNSPECIFIED MORPHOLOGY: Primary | ICD-10-CM

## 2017-03-06 PROCEDURE — 99214 OFFICE O/P EST MOD 30 MIN: CPT | Mod: S$GLB,,, | Performed by: UROLOGY

## 2017-03-06 PROCEDURE — 99999 PR PBB SHADOW E&M-EST. PATIENT-LVL II: CPT | Mod: PBBFAC,,, | Performed by: UROLOGY

## 2017-03-06 PROCEDURE — 1160F RVW MEDS BY RX/DR IN RCRD: CPT | Mod: S$GLB,,, | Performed by: UROLOGY

## 2017-03-06 RX ORDER — TAMSULOSIN HYDROCHLORIDE 0.4 MG/1
0.4 CAPSULE ORAL DAILY
Qty: 30 CAPSULE | Refills: 11 | Status: SHIPPED | OUTPATIENT
Start: 2017-03-06 | End: 2018-03-26 | Stop reason: SDUPTHER

## 2017-03-06 RX ORDER — FINASTERIDE 5 MG/1
5 TABLET, FILM COATED ORAL DAILY
Qty: 30 TABLET | Refills: 11 | Status: SHIPPED | OUTPATIENT
Start: 2017-03-06 | End: 2017-06-16 | Stop reason: ALTCHOICE

## 2017-03-06 RX ORDER — TAMSULOSIN HYDROCHLORIDE 0.4 MG/1
0.4 CAPSULE ORAL DAILY
Qty: 30 CAPSULE | Refills: 11 | Status: SHIPPED | OUTPATIENT
Start: 2017-03-06 | End: 2017-03-06 | Stop reason: SDUPTHER

## 2017-03-06 NOTE — PROGRESS NOTES
Chief Complaint: BPH?    HPI:   3/6/17: Voiding a lot better on flomax, nocturia x1-2.  1/30/17: 60 yo man had a kidney transplant 6/15 and the stent was removed 3 weeks later; he was told his bladder was really full.  No abd/pelvic pain and no exac/rel factors.  No hematuria.  No urolithiasis.  Weak stream, no hesitancy, doesn't feel he empties all the way, nocturia x8-9.  Is supposed to use a CPAP but it got flooded out.  No other urinary bother. Nocturia x4-5 on CPAP; drinks a lot of water including in the evening.  Referred by Dr. Gonsalez.    Allergies:  Lisinopril; Actos  [pioglitazone]; and Metformin    Medications:  has a current medication list which includes the following prescription(s): amlodipine, aspirin, bisacodyl, blood sugar diagnostic, ergocalciferol, famotidine, glimepiride, insulin aspart, insulin glargine, ketoconazole, lancets, magnesium oxide, metoprolol succinate, multivitamin, mycophenolate, omega-3 fatty acids-vitamin e, pen needle, diabetic, prednisone, rosuvastatin, tacrolimus, tamsulosin, and humalog kwikpen.    Review of Systems:  General: No fever, chills, fatigability, or weight loss.  Skin: No rashes, itching, or changes in color or texture of skin.  Chest: Denies STEELE, cyanosis, wheezing, cough, and sputum production.  Abdomen: Appetite fine. No weight loss. Denies diarrhea, abdominal pain, hematemesis, or blood in stool.  Musculoskeletal: No joint stiffness or swelling. Denies back pain.  : As above.  All other review of systems negative.    PMH:   has a past medical history of Acquired renal cyst of left kidney; Anemia associated with chronic renal failure; CAD (coronary artery disease); CHF (congestive heart failure); Chronic immunosuppression with Prograf and MMF (6/18/2015); CKD (chronic kidney disease) stage 3, GFR 30-59 ml/min; Diabetic retinopathy; DM (diabetes mellitus), type 2 with complications (1994); Edema; End stage kidney disease; Gallbladder polyp; Heart failure,  diastolic, due to HTN; Hemodialysis status; 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; HPTH (hyperparathyroidism); Hyperlipidemia; Hypertension associated with stage 3 chronic kidney disease due to type 2 diabetes mellitus; Obesity; Proteinuria; S/P kidney transplant; Type 2 diabetes, uncontrolled, with retinopathy; and Type II diabetes mellitus with renal manifestations.    PSH:   has a past surgical history that includes Retinal laser procedure; Cardiac catheterization (2008); and Kidney transplant.    FamHx: family history includes Diabetes in his maternal grandmother, mother, and sister; Heart failure in his mother; Hypertension in his mother; Kidney disease in his sister. There is no history of Cancer.    SocHx:  reports that he has never smoked. He quit smokeless tobacco use about 3 years ago. He reports that he uses illicit drugs, including Marijuana, about twice per week. He reports that he does not drink alcohol.      Physical Exam:  There were no vitals filed for this visit.  General: A&Ox3, no apparent distress, no deformities  Neck: No masses, normal thyroid  Lungs: normal inspiration, no use of accessory muscles  Heart: normal pulse, no arrhythmias  Abdomen: Soft, NT, ND  Skin: The skin is warm and dry. No jaundice.  Ext: No c/c/e.  :  1/17:  Test desc jamie, no abnormalities of epididymus. Penis normal, with normal penile and scrotal skin. Meatus normal. Normal rectal tone, no hemorrhoids. Prost 40 gm no nodules or masses appreciated. SV not palpable. Perineum and anus normal.    Labs/Studies:   Urinalysis performed in clinic, summary: UA normal exc 50 glucose  Bladder Scan performed in office:     1/17: PVR 42 ml.  PSA    12/16: 0.26    Impression/Plan:   1. Has symptomatic BPH.  Flomax working well.  Added finasteride.  PSA/RTC 1 year

## 2017-03-27 ENCOUNTER — OFFICE VISIT (OUTPATIENT)
Dept: OPHTHALMOLOGY | Facility: CLINIC | Age: 62
End: 2017-03-27
Payer: COMMERCIAL

## 2017-03-27 DIAGNOSIS — E11.3553 TYPE 2 DIABETES MELLITUS WITH STABLE PROLIFERATIVE RETINOPATHY OF BOTH EYES, WITH LONG-TERM CURRENT USE OF INSULIN: Primary | ICD-10-CM

## 2017-03-27 DIAGNOSIS — Z96.1 PSEUDOPHAKIA: ICD-10-CM

## 2017-03-27 DIAGNOSIS — Z79.4 TYPE 2 DIABETES MELLITUS WITH STABLE PROLIFERATIVE RETINOPATHY OF BOTH EYES, WITH LONG-TERM CURRENT USE OF INSULIN: Primary | ICD-10-CM

## 2017-03-27 DIAGNOSIS — H35.373 EPIRETINAL MEMBRANE, BILATERAL: ICD-10-CM

## 2017-03-27 PROCEDURE — 92014 COMPRE OPH EXAM EST PT 1/>: CPT | Mod: S$GLB,,, | Performed by: OPHTHALMOLOGY

## 2017-03-27 PROCEDURE — 99999 PR PBB SHADOW E&M-EST. PATIENT-LVL II: CPT | Mod: PBBFAC,,, | Performed by: OPHTHALMOLOGY

## 2017-03-27 PROCEDURE — 92134 CPTRZ OPH DX IMG PST SGM RTA: CPT | Mod: S$GLB,,, | Performed by: OPHTHALMOLOGY

## 2017-03-27 NOTE — PROGRESS NOTES
===============================  Mitch Whittaker,   61 y.o. male   Last visit [facility]: :10/16/2015   Last visit eye dept. Visit date not found  [facility]:  Uncorrected distance visual acuity was 20/30 in the right eye and 20/25 in the left eye.  Tonometry     Tonometry (Applanation, 10:23 AM)      Right Left   Pressure 18 10                  Not recorded        Manifest Refraction     Manifest Refraction      Sphere Cylinder Axis Dist   Right -0.75 +0.25 180 20/20   Left -0.50   20/25                 Chief Complaint   Patient presents with    Diabetic Eye Exam     yearly diabetic exam/  pt states that his left eye stays watery.        HPI     Diabetic Eye Exam    Additional comments: yearly diabetic exam/  pt states that his left eye   stays watery.           Comments   DM since approx 1996  PDR S/P PRP OU  Old Focal OU  Old OS TRD  ERM OU  PCIOL OU  KIDNEY TRANSPLANT 6/12/15       Last edited by Kathie Boyd on 3/27/2017 10:14 AM. (History)      Read Studies: y  Vitalsy    ________________  3/27/2017  Problem List Items Addressed This Visit        Eye/Vision problems    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, with long-term current use of insulin - Primary    Overview     DM since 1996  H/o PRP and Focal OU  OS TRD  H/O Kidney transplant 6/12/15  A1C 10.2 2/17         Relevant Orders    Posterior Segment OCT Retina-Both eyes (Completed)    Pseudophakia    Overview     2005 Dr. Merlos         Epiretinal membrane (ERM) of both eyes        A1C 10.2 2/17/17.  Sp ppr ou   regerssed fvp od  No me on oct  allergci styoms   Good!   dragan rtc  1 year  Call 24/7 for any worsening of vision. Check OU QD. Gave my home phone number.       ===========================

## 2017-03-27 NOTE — LETTER
March 27, 2017      Cornelio Ham MD  [address]  [address]  [place] [address]           [facility]  [address]  [place] [address]  Phone: [phone]  Fax: [phone]          Patient: Mitch Whittaker   MR Number: [id number]   YOB: 1955   Date of Visit: 3/27/2017       Dear Dr. Cornelio Ham:    Thank you for referring Mitch Whittaker to me for evaluation. Attached you will find relevant portions of my assessment and plan of care.    If you have questions, please do not hesitate to call me. I look forward to following Mitch Whittaker along with you.    Sincerely,    BETTY Zuniga MD    Enclosure  CC:  No Recipients    If you would like to receive this communication electronically, please contact [email or web address] or [phone] to request more information on [employer] access.    For providers and/or their staff who would like to refer a patient to [facility], please contact us through our one-stop-shop provider referral line, [facility], at [phone].    If you feel you have received this communication in error or would no longer like to receive these types of communications, please e-mail [email or web address]

## 2017-03-27 NOTE — MR AVS SNAPSHOT
[facility] - Ophthalmology  [address] [facility] [address] [facility] [place] [address]  Phone: [phone]  Fax: [phone]                  Mitch Whittaker   3/27/2017 10:00 AM   Office Visit    Description:  Male : 1955   Provider:  BETTY Zuniga MD   Department:  [facility]           Reason for Visit     Diabetic Eye Exam           Diagnoses this Visit        Comments    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, with long-term current use of insulin    -  Primary     Pseudophakia         Epiretinal membrane, bilateral                To Do List           Future Appointments        Provider Department Dept Phone    2017 8:30 AM LABORATORY, [place] [facility] [phone]    3/6/2018 9:30 AM LABORATORY, [address] [facility] [phone]    3/9/2018 8:20 AM Sal Gallagher IV, MD [facility] - Urology [phone]      Goals (5 Years of Data)     None      [facility]     [facility] - / Assistance  Registered nurses in the [facility] provide clinical advisement, health education, appointment booking, and other advisory services.  Call for this free service at [phone].             Medications           Message regarding Medications     Verify the changes and/or additions to your medication regime listed below are the same as discussed with your clinician today.  If any of these changes or additions are incorrect, please notify your healthcare provider.             Verify that the below list of medications is an accurate representation of the medications you are currently taking.  If none reported, the list may be blank. If incorrect, please contact your healthcare provider. Carry this list with you in case of emergency.           Current Medications     amlodipine (NORVASC) 2.5 MG tablet Take 1 tablet (2.5 mg total) by mouth once daily.    aspirin (ECOTRIN) 81 MG EC tablet Take 1 tablet by mouth  "Daily.     bisacodyl (DULCOLAX) 5 mg EC tablet Take 5 mg by mouth daily as needed for Constipation.    blood sugar diagnostic Strp 1 strip by Misc.(Non-Drug; Combo Route) route as directed. Use to check BG 2-3 times daily. Contour next strips    ergocalciferol (VITAMIN D2) 50,000 unit Cap TAKE 1 CAPSULE BY MOUTH EVERY 7 DAYS.    famotidine (PEPCID) 20 MG tablet Take 1 tablet (20 mg total) by mouth every evening.    finasteride (PROSCAR) 5 mg tablet Take 1 tablet (5 mg total) by mouth once daily.    glimepiride (AMARYL) 2 MG tablet Take 1 tablet (2 mg total) by mouth before breakfast.    insulin aspart (NOVOLOG FLEXPEN) 100 unit/mL InPn pen 15 plus: 180-230=2, 231-280=4, 281-330=6, 331-380=8, >380=10 with each meal Min15- 20 units TID    insulin glargine (LANTUS SOLOSTAR) 100 unit/mL (3 mL) InPn pen Inject 30 Units into the skin 2 (two) times daily.    ketoconazole (NIZORAL) 200 mg Tab Take 0.5 tablets (100 mg total) by mouth once daily.    lancets 33 gauge Misc 1 Stick by Misc.(Non-Drug; Combo Route) route as directed. Contour lancets. Use to check BG 2-3 times daily.    magnesium oxide (MAG-OX) 400 mg tablet Take 2 tablets (800 mg total) by mouth 2 (two) times daily.    metoprolol succinate (TOPROL-XL) 25 MG 24 hr tablet Take 2 tablets (50 mg total) by mouth once daily.    multivitamin (THERAGRAN) tablet Take 1 tablet by mouth once daily.    mycophenolate (CELLCEPT) 250 mg Cap Take 3 capsules (750 mg total) by mouth 2 (two) times daily.    omega-3 fatty acids-vitamin E (FISH OIL) 1,000 mg Cap Take 1 capsule by mouth once daily.     pen needle, diabetic (BD INSULIN PEN NEEDLE UF SHORT) 31 gauge x 5/16" Ndle USE TO INJECT INSULIN TWICE A DAY    predniSONE (DELTASONE) 5 MG tablet Take 1 tablet (5 mg total) by mouth once daily. Z94.0    rosuvastatin (CRESTOR) 40 MG Tab Take 1 tablet (40 mg total) by mouth every evening.    tacrolimus (PROGRAF) 1 MG Cap 5mg in AM 4mg in PMKIDNEY TRANSPLANT 6-12-15 z94.0    tamsulosin " (FLOMAX) 0.4 mg Cp24 Take 1 capsule (0.4 mg total) by mouth once daily.           Clinical Reference Information           Allergies as of 3/27/2017     Lisinopril    Actos  [Pioglitazone]    Metformin      Immunizations Administered on Date of Encounter - 3/27/2017     None      Orders Placed During Today's Visit      Normal Orders This Visit    Posterior Segment OCT Retina-Both eyes       Maintenance Dialysis History     Start End Type Comments Center    7/9/2013  [facility]            Current Dialysis Center Information     [facility] [address] Phone #:  [phone]    Contact:  [place]  [address] Fax #:  [phone]            Transplant Information        Txp Date Organ Coordinator Care Team    6/12/2015 Kidney Sean Lobato RN Referring Physician:  Bucky Danielle MD   Current Nephrologist:  Bucky Danielle MD   Surgeon:  Carlota Connors MD   Assisting Surgeon:  Magda Pereira MD         Language Assistance Services     ATTENTION: Language assistance services are available, free of charge. Please call [phone].      ATENCIÓN: Si habla español, tiene a schumacher disposición servicios gratuitos de asistencia lingüística. Llame al [phone].     CHÚ Ý: N?u b?n nói Ti?ng Vi?t, có các d?ch v? h? tr? ngôn ng? mi?n phí dành cho b?n. G?i s? [phone].         [facility] complies with applicable Federal civil rights laws and does not discriminate on the basis of race, color, national origin, age, disability, or sex.

## 2017-04-06 ENCOUNTER — PATIENT MESSAGE (OUTPATIENT)
Dept: RESEARCH | Facility: HOSPITAL | Age: 62
End: 2017-04-06

## 2017-04-24 RX ORDER — METOPROLOL SUCCINATE 25 MG/1
TABLET, EXTENDED RELEASE ORAL
Qty: 60 TABLET | Refills: 4 | Status: SHIPPED | OUTPATIENT
Start: 2017-04-24 | End: 2017-09-20 | Stop reason: SDUPTHER

## 2017-04-25 DIAGNOSIS — E11.21 TYPE II DIABETES MELLITUS WITH NEPHROPATHY: ICD-10-CM

## 2017-04-25 RX ORDER — INSULIN ASPART 100 [IU]/ML
INJECTION, SOLUTION INTRAVENOUS; SUBCUTANEOUS
Qty: 1 BOX | Refills: 11 | Status: SHIPPED | OUTPATIENT
Start: 2017-04-25 | End: 2017-06-19 | Stop reason: SDUPTHER

## 2017-05-12 DIAGNOSIS — Z94.0 KIDNEY REPLACED BY TRANSPLANT: Primary | ICD-10-CM

## 2017-05-19 DIAGNOSIS — E11.21 TYPE II DIABETES MELLITUS WITH NEPHROPATHY: ICD-10-CM

## 2017-05-19 RX ORDER — INSULIN GLARGINE 100 [IU]/ML
INJECTION, SOLUTION SUBCUTANEOUS
Qty: 30 ML | Refills: 9 | Status: SHIPPED | OUTPATIENT
Start: 2017-05-19 | End: 2018-03-05 | Stop reason: SDUPTHER

## 2017-06-05 ENCOUNTER — LAB VISIT (OUTPATIENT)
Dept: LAB | Facility: HOSPITAL | Age: 62
End: 2017-06-05
Attending: INTERNAL MEDICINE
Payer: COMMERCIAL

## 2017-06-05 DIAGNOSIS — Z94.0 KIDNEY REPLACED BY TRANSPLANT: ICD-10-CM

## 2017-06-05 DIAGNOSIS — E11.8 TYPE 2 DIABETES MELLITUS WITH COMPLICATION, WITH LONG-TERM CURRENT USE OF INSULIN: ICD-10-CM

## 2017-06-05 DIAGNOSIS — Z79.4 TYPE 2 DIABETES MELLITUS WITH COMPLICATION, WITH LONG-TERM CURRENT USE OF INSULIN: ICD-10-CM

## 2017-06-05 LAB
ALBUMIN SERPL BCP-MCNC: 3.5 G/DL
ALBUMIN SERPL BCP-MCNC: 3.5 G/DL
ALP SERPL-CCNC: 77 U/L
ALT SERPL W/O P-5'-P-CCNC: 18 U/L
ANION GAP SERPL CALC-SCNC: 9 MMOL/L
ANION GAP SERPL CALC-SCNC: 9 MMOL/L
AST SERPL-CCNC: 18 U/L
BASOPHILS # BLD AUTO: 0.02 K/UL
BASOPHILS NFR BLD: 0.3 %
BILIRUB DIRECT SERPL-MCNC: 0.3 MG/DL
BILIRUB SERPL-MCNC: 0.5 MG/DL
BUN SERPL-MCNC: 15 MG/DL
BUN SERPL-MCNC: 15 MG/DL
CALCIUM SERPL-MCNC: 9.2 MG/DL
CALCIUM SERPL-MCNC: 9.2 MG/DL
CHLORIDE SERPL-SCNC: 104 MMOL/L
CHLORIDE SERPL-SCNC: 104 MMOL/L
CHOLEST/HDLC SERPL: 3.3 {RATIO}
CO2 SERPL-SCNC: 27 MMOL/L
CO2 SERPL-SCNC: 27 MMOL/L
CREAT SERPL-MCNC: 1.4 MG/DL
CREAT SERPL-MCNC: 1.4 MG/DL
DIFFERENTIAL METHOD: ABNORMAL
EOSINOPHIL # BLD AUTO: 0 K/UL
EOSINOPHIL NFR BLD: 0.3 %
ERYTHROCYTE [DISTWIDTH] IN BLOOD BY AUTOMATED COUNT: 13.5 %
EST. GFR  (AFRICAN AMERICAN): >60 ML/MIN/1.73 M^2
EST. GFR  (AFRICAN AMERICAN): >60 ML/MIN/1.73 M^2
EST. GFR  (NON AFRICAN AMERICAN): 53.1 ML/MIN/1.73 M^2
EST. GFR  (NON AFRICAN AMERICAN): 53.1 ML/MIN/1.73 M^2
GLUCOSE SERPL-MCNC: 157 MG/DL
GLUCOSE SERPL-MCNC: 157 MG/DL
HCT VFR BLD AUTO: 43.6 %
HDL/CHOLESTEROL RATIO: 30.1 %
HDLC SERPL-MCNC: 176 MG/DL
HDLC SERPL-MCNC: 53 MG/DL
HGB BLD-MCNC: 13.6 G/DL
LDLC SERPL CALC-MCNC: 100.6 MG/DL
LYMPHOCYTES # BLD AUTO: 1 K/UL
LYMPHOCYTES NFR BLD: 15.4 %
MAGNESIUM SERPL-MCNC: 1.6 MG/DL
MCH RBC QN AUTO: 26.3 PG
MCHC RBC AUTO-ENTMCNC: 31.2 %
MCV RBC AUTO: 84 FL
MONOCYTES # BLD AUTO: 0.7 K/UL
MONOCYTES NFR BLD: 11 %
NEUTROPHILS # BLD AUTO: 4.6 K/UL
NEUTROPHILS NFR BLD: 72.1 %
NONHDLC SERPL-MCNC: 123 MG/DL
PHOSPHATE SERPL-MCNC: 2.5 MG/DL
PLATELET # BLD AUTO: 173 K/UL
PMV BLD AUTO: 11.4 FL
POTASSIUM SERPL-SCNC: 3.8 MMOL/L
POTASSIUM SERPL-SCNC: 3.8 MMOL/L
PROT SERPL-MCNC: 6.7 G/DL
RBC # BLD AUTO: 5.17 M/UL
SODIUM SERPL-SCNC: 140 MMOL/L
SODIUM SERPL-SCNC: 140 MMOL/L
TRIGL SERPL-MCNC: 112 MG/DL
WBC # BLD AUTO: 6.37 K/UL

## 2017-06-05 PROCEDURE — 83735 ASSAY OF MAGNESIUM: CPT

## 2017-06-05 PROCEDURE — 83036 HEMOGLOBIN GLYCOSYLATED A1C: CPT

## 2017-06-05 PROCEDURE — 80069 RENAL FUNCTION PANEL: CPT

## 2017-06-05 PROCEDURE — 80197 ASSAY OF TACROLIMUS: CPT

## 2017-06-05 PROCEDURE — 85025 COMPLETE CBC W/AUTO DIFF WBC: CPT

## 2017-06-05 PROCEDURE — 84075 ASSAY ALKALINE PHOSPHATASE: CPT

## 2017-06-05 PROCEDURE — 80061 LIPID PANEL: CPT

## 2017-06-05 PROCEDURE — 87799 DETECT AGENT NOS DNA QUANT: CPT

## 2017-06-05 PROCEDURE — 36415 COLL VENOUS BLD VENIPUNCTURE: CPT | Mod: PO

## 2017-06-06 LAB
ESTIMATED AVG GLUCOSE: 220 MG/DL
HBA1C MFR BLD HPLC: 9.3 %
TACROLIMUS BLD-MCNC: 7.9 NG/ML

## 2017-06-06 NOTE — PROGRESS NOTES
Encourage tosha phophorus diet and send this report to nephrologist for follow up of low phos please

## 2017-06-09 ENCOUNTER — TELEPHONE (OUTPATIENT)
Dept: INTERNAL MEDICINE | Facility: CLINIC | Age: 62
End: 2017-06-09

## 2017-06-12 ENCOUNTER — TELEPHONE (OUTPATIENT)
Dept: INTERNAL MEDICINE | Facility: CLINIC | Age: 62
End: 2017-06-12

## 2017-06-12 ENCOUNTER — DOCUMENTATION ONLY (OUTPATIENT)
Dept: INTERNAL MEDICINE | Facility: CLINIC | Age: 62
End: 2017-06-12

## 2017-06-12 NOTE — TELEPHONE ENCOUNTER
Called pt left message with family member to call office to schedule an appointment to discuss lab results.

## 2017-06-14 LAB
BK VIRUS DNA PCR, QUANT, BLOOD: NORMAL
BK VIRUS DNA, BLOOD: NORMAL
LOG BKV COPIES/ML: NORMAL

## 2017-06-15 ENCOUNTER — TELEPHONE (OUTPATIENT)
Dept: INTERNAL MEDICINE | Facility: CLINIC | Age: 62
End: 2017-06-15

## 2017-06-15 NOTE — TELEPHONE ENCOUNTER
----- Message from Cornelio Ham MD sent at 6/15/2017  9:56 AM CDT -----  Please call daily as patient needs an appt to discuss labs.

## 2017-06-16 ENCOUNTER — OFFICE VISIT (OUTPATIENT)
Dept: TRANSPLANT | Facility: CLINIC | Age: 62
End: 2017-06-16
Payer: COMMERCIAL

## 2017-06-16 VITALS
HEART RATE: 111 BPM | BODY MASS INDEX: 44.47 KG/M2 | SYSTOLIC BLOOD PRESSURE: 135 MMHG | TEMPERATURE: 98 F | RESPIRATION RATE: 16 BRPM | DIASTOLIC BLOOD PRESSURE: 74 MMHG | HEIGHT: 67 IN | OXYGEN SATURATION: 98 % | WEIGHT: 283.31 LBS

## 2017-06-16 DIAGNOSIS — E11.21 TYPE 2 DIABETES MELLITUS WITH DIABETIC NEPHROPATHY, WITH LONG-TERM CURRENT USE OF INSULIN: ICD-10-CM

## 2017-06-16 DIAGNOSIS — Z29.89 PROPHYLACTIC IMMUNOTHERAPY: Chronic | ICD-10-CM

## 2017-06-16 DIAGNOSIS — Z79.4 TYPE 2 DIABETES MELLITUS WITH STABLE PROLIFERATIVE RETINOPATHY OF BOTH EYES, WITH LONG-TERM CURRENT USE OF INSULIN: ICD-10-CM

## 2017-06-16 DIAGNOSIS — Z79.4 TYPE 2 DIABETES MELLITUS WITH DIABETIC NEPHROPATHY, WITH LONG-TERM CURRENT USE OF INSULIN: ICD-10-CM

## 2017-06-16 DIAGNOSIS — E11.22 HYPERTENSION ASSOCIATED WITH STAGE 3 CHRONIC KIDNEY DISEASE DUE TO TYPE 2 DIABETES MELLITUS: ICD-10-CM

## 2017-06-16 DIAGNOSIS — D63.1 ANEMIA ASSOCIATED WITH CHRONIC RENAL FAILURE: ICD-10-CM

## 2017-06-16 DIAGNOSIS — Z94.0 KIDNEY TRANSPLANT STATUS, LIVING RELATED DONOR: Primary | Chronic | ICD-10-CM

## 2017-06-16 DIAGNOSIS — N18.30 HYPERTENSION ASSOCIATED WITH STAGE 3 CHRONIC KIDNEY DISEASE DUE TO TYPE 2 DIABETES MELLITUS: ICD-10-CM

## 2017-06-16 DIAGNOSIS — E11.3553 TYPE 2 DIABETES MELLITUS WITH STABLE PROLIFERATIVE RETINOPATHY OF BOTH EYES, WITH LONG-TERM CURRENT USE OF INSULIN: ICD-10-CM

## 2017-06-16 DIAGNOSIS — E66.01 MORBID OBESITY, UNSPECIFIED OBESITY TYPE: ICD-10-CM

## 2017-06-16 DIAGNOSIS — I12.9 HYPERTENSION ASSOCIATED WITH STAGE 3 CHRONIC KIDNEY DISEASE DUE TO TYPE 2 DIABETES MELLITUS: ICD-10-CM

## 2017-06-16 DIAGNOSIS — I25.10 CORONARY ARTERY DISEASE INVOLVING NATIVE CORONARY ARTERY OF NATIVE HEART WITHOUT ANGINA PECTORIS: ICD-10-CM

## 2017-06-16 DIAGNOSIS — N18.9 ANEMIA ASSOCIATED WITH CHRONIC RENAL FAILURE: ICD-10-CM

## 2017-06-16 PROCEDURE — 99215 OFFICE O/P EST HI 40 MIN: CPT | Mod: S$GLB,,, | Performed by: NURSE PRACTITIONER

## 2017-06-16 PROCEDURE — 3066F NEPHROPATHY DOC TX: CPT | Mod: S$GLB,,, | Performed by: NURSE PRACTITIONER

## 2017-06-16 PROCEDURE — 99999 PR PBB SHADOW E&M-EST. PATIENT-LVL V: CPT | Mod: PBBFAC,,, | Performed by: NURSE PRACTITIONER

## 2017-06-16 PROCEDURE — 3046F HEMOGLOBIN A1C LEVEL >9.0%: CPT | Mod: S$GLB,,, | Performed by: NURSE PRACTITIONER

## 2017-06-16 NOTE — PROGRESS NOTES
Kidney Post-Transplant Assessment    Referring Physician: Bucky Danielle  Current Nephrologist: Bucky Danielle    ORGAN: LEFT KIDNEY  Donor Type: living  PHS Increased Risk: no  Cold Ischemia: 44 mins  Induction Medications: thymoglobulin    Subjective:     CC:  Reassessment of renal allograft function and management of chronic immunosuppression.    HPI:  Mr. Whittaker is a 61 y.o. year old Black or  male who received a living kidney transplant on 6/12/15.  He has CKD stage 2 - GFR 60-89 and his baseline creatinine is between 1.6-1.9. He takes mycophenolate mofetil, prednisone and tacrolimus for maintenance immunosuppression. He denies any recent hospitalizations or ER visits since his previous clinic visit.    Overall feels well. No health concerns today.   Denies chest pain, SOB, leg pain, abdominal pain or LUTs.    Past Medical History:   Diagnosis Date    Acquired renal cyst of left kidney     Anemia associated with chronic renal failure     CAD (coronary artery disease)     nonobstructive [facility] 9/14    CHF (congestive heart failure)     Chronic immunosuppression with Prograf and MMF 6/18/2015    CKD (chronic kidney disease) stage 3, GFR 30-59 ml/min     Diabetic retinopathy     DM (diabetes mellitus), type 2 with complications 1994    Edema     End stage kidney disease     s/p transplant, doing well    Gallbladder polyp     Heart failure, diastolic, due to HTN     Hemodialysis status     off since transplant    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     HPTH (hyperparathyroidism)     Hyperlipidemia     Hypertension associated with stage 3 chronic kidney disease due to type 2 diabetes mellitus     Obesity     Proteinuria     resolved s/p transplant    S/P kidney transplant     Type 2 diabetes, uncontrolled, with retinopathy     Type II diabetes mellitus with renal manifestations        Review of Systems   Constitutional: Positive for  "unexpected weight change. Negative for activity change, appetite change, chills, fatigue and fever.        Has gained weight   HENT: Negative for congestion, facial swelling, postnasal drip, rhinorrhea, sinus pressure, sore throat and trouble swallowing.    Eyes: Negative for pain, redness and visual disturbance.   Respiratory: Negative for cough, chest tightness, shortness of breath and wheezing.    Cardiovascular: Negative.  Negative for chest pain, palpitations and leg swelling.   Gastrointestinal: Negative for abdominal pain, diarrhea, nausea and vomiting.        Obese abdomen   Genitourinary: Negative for dysuria, flank pain and urgency.   Musculoskeletal: Negative for gait problem, neck pain and neck stiffness.   Skin: Negative for rash.   Allergic/Immunologic: Positive for immunocompromised state. Negative for environmental allergies and food allergies.   Neurological: Negative for dizziness, weakness, light-headedness and headaches.   Psychiatric/Behavioral: Negative for agitation and confusion. The patient is not nervous/anxious.        Objective:   Blood pressure 135/74, pulse (!) 111, temperature 98.2 °F (36.8 °C), temperature source Oral, resp. rate 16, height 5' 7" (1.702 m), weight 128.5 kg (283 lb 4.7 oz), SpO2 98 %.body mass index is 44.37 kg/m².    Physical Exam   Constitutional: He is oriented to person, place, and time. He appears well-developed and well-nourished.   HENT:   Head: Normocephalic.   Mouth/Throat: Oropharynx is clear and moist. No oropharyngeal exudate.   Eyes: Conjunctivae and EOM are normal. Pupils are equal, round, and reactive to light. No scleral icterus.   Neck: Normal range of motion. Neck supple.   Cardiovascular: Normal rate, regular rhythm and normal heart sounds.    Pulmonary/Chest: Effort normal and breath sounds normal.   Abdominal: Soft. Normal appearance and bowel sounds are normal. He exhibits no distension and no mass. There is no splenomegaly or hepatomegaly. There " is no tenderness. There is no rebound, no guarding, no CVA tenderness, no tenderness at McBurney's point and negative Durand's sign.       Obese abdomen   Musculoskeletal: Normal range of motion. He exhibits no edema.   Lymphadenopathy:     He has no cervical adenopathy.   Neurological: He is alert and oriented to person, place, and time. He exhibits normal muscle tone. Coordination normal.   Skin: Skin is warm and dry.   Psychiatric: He has a normal mood and affect. His behavior is normal.   Vitals reviewed.      Labs:  Lab Results   Component Value Date    WBC 6.37 06/05/2017    HGB 13.6 (L) 06/05/2017    HCT 43.6 06/05/2017     06/05/2017     06/05/2017    K 3.8 06/05/2017    K 3.8 06/05/2017     06/05/2017     06/05/2017    CO2 27 06/05/2017    CO2 27 06/05/2017    BUN 15 06/05/2017    BUN 15 06/05/2017    CREATININE 1.4 06/05/2017    CREATININE 1.4 06/05/2017    EGFRNONAA 53.1 (A) 06/05/2017    EGFRNONAA 53.1 (A) 06/05/2017    CALCIUM 9.2 06/05/2017    CALCIUM 9.2 06/05/2017    PHOS 2.5 (L) 06/05/2017    MG 1.6 06/05/2017    ALBUMIN 3.5 06/05/2017    ALBUMIN 3.5 06/05/2017    AST 18 06/05/2017    ALT 18 06/05/2017       No results found for: EXTANC, EXTWBC, EXTSEGS, EXTPLATELETS, EXTHEMOGLOBI, EXTHEMATOCRI, EXTCREATININ, EXTSODIUM, EXTPOTASSIUM, EXTBUN, EXTCO2, EXTCALCIUM, EXTPHOSPHORU, EXTGLUCOSE, EXTALBUMIN, EXTAST, EXTALT, EXTBILITOTAL, EXTLIPASE, EXTAMYLASE    No results found for: EXTCYCLOSLVL, EXTSIROLIMUS, EXTTACROLVL, EXTPROTCRE, EXTPTHINTACT, EXTPROTEINUA, EXTWBCUA, EXTRBCUA    Labs were reviewed with the patient.    Assessment:     1. Living-related donor kidney transplant (daughter) - 6/12/15    2. Hypertension associated with stage 3 chronic kidney disease due to type 2 diabetes mellitus    3. 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, with long-term current use of insulin    4. Type 2 diabetes mellitus with diabetic nephropathy, with  long-term current use of insulin    5. Morbid obesity, unspecified obesity type    6. Chronic immunosuppression with Prograf, MMF and prednisone    7. Coronary artery disease involving native coronary artery of native heart without angina pectoris    8. Anemia associated with chronic renal failure        Plan:     Follow-up:   1. CKD stage: 2 stable    2. Immunosuppression:   Prograf trough 7.9, which is  therapeutic target 5-7. Continue  Prograf 5/4,  Mg BID, and Prednisone 5 mg QD  Ketoconazole 100 mg QD   Will continue to monitor for drug toxicities    3. Allograft Function: Stable. Continue good po hydration.       Lab Results   Component Value Date           CREATININE 1.4 06/05/2017     eGFR if African American >60 mL/min/1.73 m^2 >60.0          06/05/2017     4. Hypertension management: stable, advise low salt diet and home BP monitoring    Norvasc 2.5 mg QD and Toprol XL 25 mg QD    5. Metabolic Bone Disease/Secondary Hyperparathyroidism:stable  Will monitor PTH, CA and Vit D/guidelines  Lab Results   Component Value Date    CALCIUM 9.2 06/05/2017    CALCIUM 9.2 06/05/2017    PHOS 2.5 (L) 06/05/2017     MG 1.6 06/05/2017   Mg Ox 400 mg BID    6. Electrolytes: Normal calcium. Bicarbonate is normal  Lab Results   Component Value Date     06/05/2017     06/05/2017    K 3.8 06/05/2017    K 3.8 06/05/2017     06/05/2017     06/05/2017    CO2 27 06/05/2017    CO2 27 06/05/2017         7. Anemia: resolved. No need for intervention      Lab Results   Component Value Date    WBC 6.37 06/05/2017    HGB 13.6 (L) 06/05/2017    HCT 43.6 06/05/2017    MCV 84 06/05/2017     06/05/2017         8.  Cytopenias: no significant cytopenias will monitor as per our guidelines. Medicine list reviewed including potential causes of drug-induced cytopenias    9.Proteinuria: continue p/c ratio as per guidelines    6/5/2017  Prot/Creat Ratio, Ur 0.00 - 0.20 0.12       10. BK virus infection  screening:  will continue to monitor/ guidelines  6/5/2017  BK   Non detected DNA, < 500 copies    11. Weight education: provided during the clinic visit  Morbid Obesity   Body mass index is 44.37 kg/m².  Encourage lifestyle modifications: diet, exercise, weight loss, low sodium diet/ 2 gms/day or less        12.Patient safety education regarding immunosuppression including prophylaxis posttransplant for CMV, PCP : Education provided about vaccination and prevention of infections        Follow up:   Annual follow-up with kidney transplant clinic with repeat labs, including renal function panel with UA, urine protein/creatinine ratio, and drug trough level q3 months.  Patient also reminded to follow-up with general nephrologist.    Gita Trevizo NP       Education:   Material provided to the patient.  Patient reminded to call with any health changes since these can be early signs of significant complications.  Also, I advised the patient to be sure any new medications or changes of old medications are discussed with either a pharmacist or physician knowledgeable with transplant to avoid rejection/drug toxicity related to significant drug interactions.    UNOS Patient Status  Functional Status: 80% - Normal activity with effort: some symptoms of disease  Physical Capacity: No Limitations

## 2017-06-16 NOTE — LETTER
June 16, 2017        Bucky Danielle  [address]  [place] [address]  Phone: [phone]  Fax: [phone]             [facility]  [address]  [facility] [address]  Phone: [phone]   Patient: Mitch Whittaker   MR Number: [id number]   YOB: 1955   Date of Visit: 6/16/2017       Dear Dr. Bucky Danielle    Thank you for referring Mitch Whittaker to me for evaluation. Attached you will find relevant portions of my assessment and plan of care.    If you have questions, please do not hesitate to call me. I look forward to following Mitch Whittaker along with you.    Sincerely,    Gita Trevizo NP    Enclosure    If you would like to receive this communication electronically, please contact [email or web address] or [phone] to request [employer] Link access.    [employer] Link is a tool which provides read-only access to select patient information with whom you have a relationship. Its easy to use and provides real time access to review your patients record including encounter summaries, notes, results, and demographic information.    If you feel you have received this communication in error or would no longer like to receive these types of communications, please e-mail [email or web address]

## 2017-06-19 ENCOUNTER — OFFICE VISIT (OUTPATIENT)
Dept: INTERNAL MEDICINE | Facility: CLINIC | Age: 62
End: 2017-06-19
Payer: COMMERCIAL

## 2017-06-19 VITALS
SYSTOLIC BLOOD PRESSURE: 128 MMHG | DIASTOLIC BLOOD PRESSURE: 80 MMHG | HEIGHT: 67 IN | BODY MASS INDEX: 44.36 KG/M2 | HEART RATE: 88 BPM | WEIGHT: 282.63 LBS | OXYGEN SATURATION: 98 % | TEMPERATURE: 98 F

## 2017-06-19 DIAGNOSIS — E11.22 HYPERTENSION ASSOCIATED WITH STAGE 3 CHRONIC KIDNEY DISEASE DUE TO TYPE 2 DIABETES MELLITUS: ICD-10-CM

## 2017-06-19 DIAGNOSIS — Z79.4 TYPE 2 DIABETES MELLITUS WITH DIABETIC NEPHROPATHY, WITH LONG-TERM CURRENT USE OF INSULIN: ICD-10-CM

## 2017-06-19 DIAGNOSIS — E11.42 DIABETIC PERIPHERAL NEUROPATHY: Primary | ICD-10-CM

## 2017-06-19 DIAGNOSIS — E11.8 TYPE 2 DIABETES MELLITUS WITH COMPLICATION, WITH LONG-TERM CURRENT USE OF INSULIN: ICD-10-CM

## 2017-06-19 DIAGNOSIS — E11.21 TYPE II DIABETES MELLITUS WITH NEPHROPATHY: ICD-10-CM

## 2017-06-19 DIAGNOSIS — Z79.4 TYPE 2 DIABETES MELLITUS WITH COMPLICATION, WITH LONG-TERM CURRENT USE OF INSULIN: ICD-10-CM

## 2017-06-19 DIAGNOSIS — E11.21 TYPE 2 DIABETES MELLITUS WITH DIABETIC NEPHROPATHY, WITH LONG-TERM CURRENT USE OF INSULIN: ICD-10-CM

## 2017-06-19 DIAGNOSIS — I12.9 HYPERTENSION ASSOCIATED WITH STAGE 3 CHRONIC KIDNEY DISEASE DUE TO TYPE 2 DIABETES MELLITUS: ICD-10-CM

## 2017-06-19 DIAGNOSIS — N18.30 HYPERTENSION ASSOCIATED WITH STAGE 3 CHRONIC KIDNEY DISEASE DUE TO TYPE 2 DIABETES MELLITUS: ICD-10-CM

## 2017-06-19 PROCEDURE — 99214 OFFICE O/P EST MOD 30 MIN: CPT | Mod: S$GLB,,, | Performed by: INTERNAL MEDICINE

## 2017-06-19 PROCEDURE — 3066F NEPHROPATHY DOC TX: CPT | Mod: S$GLB,,, | Performed by: INTERNAL MEDICINE

## 2017-06-19 PROCEDURE — 3046F HEMOGLOBIN A1C LEVEL >9.0%: CPT | Mod: S$GLB,,, | Performed by: INTERNAL MEDICINE

## 2017-06-19 PROCEDURE — 99999 PR PBB SHADOW E&M-EST. PATIENT-LVL V: CPT | Mod: PBBFAC,,, | Performed by: INTERNAL MEDICINE

## 2017-06-19 RX ORDER — PEN NEEDLE, DIABETIC 31 GX5/16"
NEEDLE, DISPOSABLE MISCELLANEOUS
COMMUNITY
Start: 2017-05-19 | End: 2018-02-26 | Stop reason: SDUPTHER

## 2017-06-19 RX ORDER — INSULIN ASPART 100 [IU]/ML
INJECTION, SOLUTION INTRAVENOUS; SUBCUTANEOUS
Qty: 1 BOX | Refills: 11 | Status: SHIPPED | OUTPATIENT
Start: 2017-06-19 | End: 2017-06-19 | Stop reason: SDUPTHER

## 2017-06-19 RX ORDER — INSULIN ASPART 100 [IU]/ML
INJECTION, SOLUTION INTRAVENOUS; SUBCUTANEOUS
Qty: 15 ML | Refills: 3 | Status: SHIPPED | OUTPATIENT
Start: 2017-06-19 | End: 2018-06-15

## 2017-06-19 NOTE — PROGRESS NOTES
"HPI:  Patient is a 61-year-old man who comes in today for follow-up his diabetes.  Patient blood sugars have been running high, primarily in the afternoon and evening hours.  He states his fasting sugar in the morning usually less than 150.  He has not been using the sliding scale for his meal time.  He is only been giving 20 units in morning, 15 in the afternoon and 20 units in the evening as a fixed dose.  His Lantus is been 30 units twice a day.    Current meds have been verified and updated per the EMR  Exam:/80   Pulse 88   Temp 98.4 °F (36.9 °C) (Tympanic)   Ht 5' 7" (1.702 m)   Wt 128.2 kg (282 lb 10.1 oz)   SpO2 98%   BMI 44.27 kg/m²   Exam deferred    Lab Results   Component Value Date    WBC 6.37 06/05/2017    HGB 13.6 (L) 06/05/2017    HCT 43.6 06/05/2017     06/05/2017    CHOL 176 06/05/2017    TRIG 112 06/05/2017    HDL 53 06/05/2017    ALT 18 06/05/2017    AST 18 06/05/2017     06/05/2017     06/05/2017    K 3.8 06/05/2017    K 3.8 06/05/2017     06/05/2017     06/05/2017    CREATININE 1.4 06/05/2017    CREATININE 1.4 06/05/2017    BUN 15 06/05/2017    BUN 15 06/05/2017    CO2 27 06/05/2017    CO2 27 06/05/2017    TSH 0.911 02/27/2017    PSA 0.38 02/27/2017    INR 1.0 06/18/2015    HGBA1C 9.3 (H) 06/05/2017       Impression:  Diabetes, not well controlled  Patient Active Problem List   Diagnosis    Anemia associated with chronic renal failure    Obesity    Acquired renal cyst of left kidney    Nephrolithiasis    Sleep apnea    Pseudophakia    CAD (coronary artery disease)    Living-related donor kidney transplant (daughter) - 6/12/15    Diabetic peripheral neuropathy    Chronic immunosuppression with Prograf, MMF and prednisone    Secondary hyperparathyroidism of renal origin    CKD (chronic kidney disease) stage 3, GFR 30-59 ml/min    Type II diabetes mellitus with renal manifestations    Hypertension associated with stage 3 chronic kidney " disease due to type 2 diabetes mellitus    Right sided abdominal pain    Constipation, unspecified    Nocturia    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    DM (diabetes mellitus), type 2 with complications    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, with long-term current use of insulin    Epiretinal membrane (ERM) of both eyes       Plan:  Orders Placed This Encounter    Hemoglobin A1c    Lipid panel    Comprehensive metabolic panel    insulin aspart (NOVOLOG FLEXPEN) 100 unit/mL InPn pen    blood sugar diagnostic Strp     He will increase his mealtime insulin and use a sliding scale.  He needs to check his sugars before every meal.  He will see me in 3 months with the above lab work.  He desperately needs to eat right eat healthy stay within the carbs and exercise daily

## 2017-07-24 DIAGNOSIS — I25.10 CORONARY ARTERY DISEASE WITHOUT ANGINA PECTORIS, UNSPECIFIED VESSEL OR LESION TYPE, UNSPECIFIED WHETHER NATIVE OR TRANSPLANTED HEART: Primary | ICD-10-CM

## 2017-08-27 RX ORDER — GLIMEPIRIDE 2 MG/1
TABLET ORAL
Qty: 30 TABLET | Refills: 11 | Status: SHIPPED | OUTPATIENT
Start: 2017-08-27 | End: 2018-06-15

## 2017-09-05 DIAGNOSIS — Z94.0 KIDNEY REPLACED BY TRANSPLANT: ICD-10-CM

## 2017-09-05 RX ORDER — PREDNISONE 5 MG/1
TABLET ORAL
Qty: 30 TABLET | Refills: 11 | Status: SHIPPED | OUTPATIENT
Start: 2017-09-05 | End: 2018-09-28

## 2017-09-12 ENCOUNTER — LAB VISIT (OUTPATIENT)
Dept: LAB | Facility: HOSPITAL | Age: 62
End: 2017-09-12
Attending: INTERNAL MEDICINE
Payer: COMMERCIAL

## 2017-09-12 DIAGNOSIS — Z79.4 TYPE 2 DIABETES MELLITUS WITH DIABETIC NEPHROPATHY, WITH LONG-TERM CURRENT USE OF INSULIN: ICD-10-CM

## 2017-09-12 DIAGNOSIS — E11.21 TYPE 2 DIABETES MELLITUS WITH DIABETIC NEPHROPATHY, WITH LONG-TERM CURRENT USE OF INSULIN: ICD-10-CM

## 2017-09-12 LAB
ALBUMIN SERPL BCP-MCNC: 3.5 G/DL
ALP SERPL-CCNC: 72 U/L
ALT SERPL W/O P-5'-P-CCNC: 17 U/L
ANION GAP SERPL CALC-SCNC: 9 MMOL/L
AST SERPL-CCNC: 15 U/L
BILIRUB SERPL-MCNC: 0.6 MG/DL
BUN SERPL-MCNC: 18 MG/DL
CALCIUM SERPL-MCNC: 9.2 MG/DL
CHLORIDE SERPL-SCNC: 106 MMOL/L
CHOLEST SERPL-MCNC: 175 MG/DL
CHOLEST/HDLC SERPL: 3.5 {RATIO}
CO2 SERPL-SCNC: 28 MMOL/L
CREAT SERPL-MCNC: 1.5 MG/DL
EST. GFR  (AFRICAN AMERICAN): 56.5 ML/MIN/1.73 M^2
EST. GFR  (NON AFRICAN AMERICAN): 48.8 ML/MIN/1.73 M^2
GLUCOSE SERPL-MCNC: 136 MG/DL
HDLC SERPL-MCNC: 50 MG/DL
HDLC SERPL: 28.6 %
LDLC SERPL CALC-MCNC: 101.6 MG/DL
NONHDLC SERPL-MCNC: 125 MG/DL
POTASSIUM SERPL-SCNC: 4 MMOL/L
PROT SERPL-MCNC: 6.7 G/DL
SODIUM SERPL-SCNC: 143 MMOL/L
TRIGL SERPL-MCNC: 117 MG/DL

## 2017-09-12 PROCEDURE — 83036 HEMOGLOBIN GLYCOSYLATED A1C: CPT

## 2017-09-12 PROCEDURE — 80053 COMPREHEN METABOLIC PANEL: CPT

## 2017-09-12 PROCEDURE — 80061 LIPID PANEL: CPT

## 2017-09-12 PROCEDURE — 36415 COLL VENOUS BLD VENIPUNCTURE: CPT | Mod: PO

## 2017-09-13 LAB
ESTIMATED AVG GLUCOSE: 186 MG/DL
HBA1C MFR BLD HPLC: 8.1 %

## 2017-09-19 ENCOUNTER — OFFICE VISIT (OUTPATIENT)
Dept: INTERNAL MEDICINE | Facility: CLINIC | Age: 62
End: 2017-09-19
Payer: COMMERCIAL

## 2017-09-19 VITALS
WEIGHT: 289.25 LBS | TEMPERATURE: 99 F | DIASTOLIC BLOOD PRESSURE: 84 MMHG | SYSTOLIC BLOOD PRESSURE: 136 MMHG | HEIGHT: 67 IN | HEART RATE: 99 BPM | BODY MASS INDEX: 45.4 KG/M2 | OXYGEN SATURATION: 97 %

## 2017-09-19 DIAGNOSIS — D63.1 ANEMIA ASSOCIATED WITH CHRONIC RENAL FAILURE: ICD-10-CM

## 2017-09-19 DIAGNOSIS — E11.42 DIABETIC PERIPHERAL NEUROPATHY: Primary | ICD-10-CM

## 2017-09-19 DIAGNOSIS — N28.1 ACQUIRED RENAL CYST OF LEFT KIDNEY: ICD-10-CM

## 2017-09-19 DIAGNOSIS — Z79.4 TYPE 2 DIABETES MELLITUS WITH COMPLICATION, WITH LONG-TERM CURRENT USE OF INSULIN: ICD-10-CM

## 2017-09-19 DIAGNOSIS — R76.8 HEPATITIS C ANTIBODY POSITIVE IN BLOOD: ICD-10-CM

## 2017-09-19 DIAGNOSIS — N18.30 HYPERTENSION ASSOCIATED WITH STAGE 3 CHRONIC KIDNEY DISEASE DUE TO TYPE 2 DIABETES MELLITUS: ICD-10-CM

## 2017-09-19 DIAGNOSIS — Z79.4 TYPE 2 DIABETES MELLITUS WITH DIABETIC NEPHROPATHY, WITH LONG-TERM CURRENT USE OF INSULIN: ICD-10-CM

## 2017-09-19 DIAGNOSIS — E11.8 TYPE 2 DIABETES MELLITUS WITH COMPLICATION, WITH LONG-TERM CURRENT USE OF INSULIN: ICD-10-CM

## 2017-09-19 DIAGNOSIS — N20.0 NEPHROLITHIASIS: ICD-10-CM

## 2017-09-19 DIAGNOSIS — Z94.0 KIDNEY TRANSPLANT STATUS, LIVING RELATED DONOR: Chronic | ICD-10-CM

## 2017-09-19 DIAGNOSIS — G47.33 OBSTRUCTIVE SLEEP APNEA SYNDROME: ICD-10-CM

## 2017-09-19 DIAGNOSIS — I12.9 HYPERTENSION ASSOCIATED WITH STAGE 3 CHRONIC KIDNEY DISEASE DUE TO TYPE 2 DIABETES MELLITUS: ICD-10-CM

## 2017-09-19 DIAGNOSIS — N25.81 SECONDARY HYPERPARATHYROIDISM OF RENAL ORIGIN: ICD-10-CM

## 2017-09-19 DIAGNOSIS — Z29.89 PROPHYLACTIC IMMUNOTHERAPY: Chronic | ICD-10-CM

## 2017-09-19 DIAGNOSIS — E11.21 TYPE 2 DIABETES MELLITUS WITH DIABETIC NEPHROPATHY, WITH LONG-TERM CURRENT USE OF INSULIN: ICD-10-CM

## 2017-09-19 DIAGNOSIS — I25.10 CORONARY ARTERY DISEASE INVOLVING NATIVE CORONARY ARTERY OF NATIVE HEART WITHOUT ANGINA PECTORIS: ICD-10-CM

## 2017-09-19 DIAGNOSIS — N18.30 CKD (CHRONIC KIDNEY DISEASE) STAGE 3, GFR 30-59 ML/MIN: ICD-10-CM

## 2017-09-19 DIAGNOSIS — E11.22 HYPERTENSION ASSOCIATED WITH STAGE 3 CHRONIC KIDNEY DISEASE DUE TO TYPE 2 DIABETES MELLITUS: ICD-10-CM

## 2017-09-19 DIAGNOSIS — N18.9 ANEMIA ASSOCIATED WITH CHRONIC RENAL FAILURE: ICD-10-CM

## 2017-09-19 PROCEDURE — 99214 OFFICE O/P EST MOD 30 MIN: CPT | Mod: S$GLB,,, | Performed by: INTERNAL MEDICINE

## 2017-09-19 PROCEDURE — 3075F SYST BP GE 130 - 139MM HG: CPT | Mod: S$GLB,,, | Performed by: INTERNAL MEDICINE

## 2017-09-19 PROCEDURE — 99999 PR PBB SHADOW E&M-EST. PATIENT-LVL V: CPT | Mod: PBBFAC,,, | Performed by: INTERNAL MEDICINE

## 2017-09-19 PROCEDURE — 3008F BODY MASS INDEX DOCD: CPT | Mod: S$GLB,,, | Performed by: INTERNAL MEDICINE

## 2017-09-19 PROCEDURE — 3066F NEPHROPATHY DOC TX: CPT | Mod: S$GLB,,, | Performed by: INTERNAL MEDICINE

## 2017-09-19 PROCEDURE — 3045F PR MOST RECENT HEMOGLOBIN A1C LEVEL 7.0-9.0%: CPT | Mod: S$GLB,,, | Performed by: INTERNAL MEDICINE

## 2017-09-19 PROCEDURE — 3079F DIAST BP 80-89 MM HG: CPT | Mod: S$GLB,,, | Performed by: INTERNAL MEDICINE

## 2017-09-19 RX ORDER — FUROSEMIDE 40 MG/1
40 TABLET ORAL DAILY PRN
Qty: 15 TABLET | Refills: 11 | Status: SHIPPED | OUTPATIENT
Start: 2017-09-19 | End: 2018-12-17 | Stop reason: SDUPTHER

## 2017-09-19 NOTE — PROGRESS NOTES
"HPI:  patient is a 61-year-old man who comes today for follow-up of his diabetes, hypertension, lipids.  Patient's blood sugars have improved since last visit.  He denies any hypoglycemic events.  He unfortunately has gained about 7 pounds weight.  He states he is not able to exercise as much due to his back problems.  He also admits that he is not been very good about taking choices for food.  He actually would like to see a dietitian to help him.    Current meds have been verified and updated per the EMR  Exam:/84   Pulse 99   Temp 99.4 °F (37.4 °C) (Tympanic)   Ht 5' 7" (1.702 m)   Wt 131.2 kg (289 lb 3.9 oz)   SpO2 97%   BMI 45.30 kg/m²    Chest clear  Cardiovascular regular rate and rhythm without murmur, gallop or rub  Extremities with 2+ bilateral lower extremity edema    Lab Results   Component Value Date    WBC 6.37 06/05/2017    HGB 13.6 (L) 06/05/2017    HCT 43.6 06/05/2017     06/05/2017    CHOL 175 09/12/2017    TRIG 117 09/12/2017    HDL 50 09/12/2017    ALT 17 09/12/2017    AST 15 09/12/2017     09/12/2017    K 4.0 09/12/2017     09/12/2017    CREATININE 1.5 (H) 09/12/2017    BUN 18 09/12/2017    CO2 28 09/12/2017    TSH 0.911 02/27/2017    PSA 0.38 02/27/2017    INR 1.0 06/18/2015    HGBA1C 8.1 (H) 09/12/2017       Impression:  Hypertension, stable by home blood pressure readings.  Fluid retention, intermittent  Diabetes, control improved, but yet still not to goal  Lipids, stable  Chronic kidney disease, stable  Multiple other medical problems below, stable  Patient Active Problem List   Diagnosis    Anemia associated with chronic renal failure    Obesity    Acquired renal cyst of left kidney    Nephrolithiasis    Sleep apnea    Pseudophakia    CAD (coronary artery disease)    Living-related donor kidney transplant (daughter) - 6/12/15    Diabetic peripheral neuropathy    Chronic immunosuppression with Prograf, MMF and prednisone    Secondary " hyperparathyroidism of renal origin    CKD (chronic kidney disease) stage 3, GFR 30-59 ml/min    Type II diabetes mellitus with renal manifestations    Hypertension associated with stage 3 chronic kidney disease due to type 2 diabetes mellitus    Right sided abdominal pain    Constipation, unspecified    Nocturia    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    DM (diabetes mellitus), type 2 with complications    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, with long-term current use of insulin    Epiretinal membrane (ERM) of both eyes       Plan:  Orders Placed This Encounter    Hemoglobin A1c    Comprehensive metabolic panel    Lipid panel    Ambulatory consult to Nutrition Services    furosemide (LASIX) 40 MG tablet     He'll take Lasix intermittently when necessary for fluid retention.  Strongly encouraged him to try to lose weight, exercise.  He was referred to see a dietitian at his request.  He'll see me again in 3 months with above lab work

## 2017-09-20 RX ORDER — METOPROLOL SUCCINATE 25 MG/1
TABLET, EXTENDED RELEASE ORAL
Qty: 60 TABLET | Refills: 11 | Status: SHIPPED | OUTPATIENT
Start: 2017-09-20 | End: 2018-09-23 | Stop reason: SDUPTHER

## 2017-09-22 ENCOUNTER — CLINICAL SUPPORT (OUTPATIENT)
Dept: INTERNAL MEDICINE | Facility: CLINIC | Age: 62
End: 2017-09-22
Payer: COMMERCIAL

## 2017-09-22 DIAGNOSIS — Z23 NEED FOR VACCINATION: Primary | ICD-10-CM

## 2017-09-22 PROCEDURE — 90662 IIV NO PRSV INCREASED AG IM: CPT | Mod: S$GLB,,, | Performed by: INTERNAL MEDICINE

## 2017-09-22 PROCEDURE — 90471 IMMUNIZATION ADMIN: CPT | Mod: S$GLB,,, | Performed by: INTERNAL MEDICINE

## 2017-09-22 PROCEDURE — 99999 PR PBB SHADOW E&M-EST. PATIENT-LVL III: CPT | Mod: PBBFAC,,,

## 2017-09-25 NOTE — PROGRESS NOTES
Late entry 9/22/17 11:30 am Fluzone high dose administered.  See immunization record.  Pt advised to wait in clinic 15 minutes to monitor for side effects.  Pt voiced understanding and tolerated injection well.

## 2017-09-26 DIAGNOSIS — Z94.0 KIDNEY REPLACED BY TRANSPLANT: ICD-10-CM

## 2017-09-26 RX ORDER — MYCOPHENOLATE MOFETIL 250 MG/1
CAPSULE ORAL
Qty: 180 CAPSULE | Refills: 11 | Status: SHIPPED | OUTPATIENT
Start: 2017-09-26 | End: 2018-09-25

## 2017-09-26 RX ORDER — ERGOCALCIFEROL 1.25 MG/1
CAPSULE ORAL
Qty: 13 CAPSULE | Refills: 3 | Status: SHIPPED | OUTPATIENT
Start: 2017-09-26 | End: 2018-06-15

## 2017-11-14 RX ORDER — AMLODIPINE BESYLATE 2.5 MG/1
2.5 TABLET ORAL DAILY
Qty: 30 TABLET | Refills: 11 | Status: SHIPPED | OUTPATIENT
Start: 2017-11-14 | End: 2017-11-15 | Stop reason: SDUPTHER

## 2017-11-14 NOTE — TELEPHONE ENCOUNTER
----- Message from Andrew Cosby, PharmD sent at 11/14/2017 10:49 AM CST -----  Hello,    Pt is looking for a new rx for his norvasc 2.5 mg to be sent to [facility] at [facility].    Thanks,    Andrew

## 2017-11-15 RX ORDER — AMLODIPINE BESYLATE 2.5 MG/1
2.5 TABLET ORAL DAILY
Qty: 30 TABLET | Refills: 11 | Status: SHIPPED | OUTPATIENT
Start: 2017-11-15 | End: 2019-03-15

## 2017-11-17 ENCOUNTER — TELEPHONE (OUTPATIENT)
Dept: NEPHROLOGY | Facility: CLINIC | Age: 62
End: 2017-11-17

## 2017-11-17 NOTE — TELEPHONE ENCOUNTER
Called pt. Scheduled follow up appointment, only wants to come to [place] location, scheduled for [place], appt. Mailed to pt. As well.

## 2017-12-07 RX ORDER — LANOLIN ALCOHOL/MO/W.PET/CERES
CREAM (GRAM) TOPICAL
Qty: 120 TABLET | Refills: 11 | Status: SHIPPED | OUTPATIENT
Start: 2017-12-07 | End: 2018-06-15

## 2017-12-12 ENCOUNTER — LAB VISIT (OUTPATIENT)
Dept: LAB | Facility: HOSPITAL | Age: 62
End: 2017-12-12
Attending: INTERNAL MEDICINE
Payer: COMMERCIAL

## 2017-12-12 DIAGNOSIS — Z79.4 TYPE 2 DIABETES MELLITUS WITH COMPLICATION, WITH LONG-TERM CURRENT USE OF INSULIN: ICD-10-CM

## 2017-12-12 DIAGNOSIS — E11.8 TYPE 2 DIABETES MELLITUS WITH COMPLICATION, WITH LONG-TERM CURRENT USE OF INSULIN: ICD-10-CM

## 2017-12-12 DIAGNOSIS — Z94.0 H/O KIDNEY TRANSPLANT: ICD-10-CM

## 2017-12-12 LAB
ALBUMIN SERPL BCP-MCNC: 3.6 G/DL
ALP SERPL-CCNC: 78 U/L
ALT SERPL W/O P-5'-P-CCNC: 18 U/L
ANION GAP SERPL CALC-SCNC: 9 MMOL/L
ANION GAP SERPL CALC-SCNC: 9 MMOL/L
AST SERPL-CCNC: 12 U/L
BASOPHILS # BLD AUTO: 0.03 K/UL
BASOPHILS NFR BLD: 0.4 %
BILIRUB SERPL-MCNC: 0.5 MG/DL
BUN SERPL-MCNC: 15 MG/DL
BUN SERPL-MCNC: 15 MG/DL
CALCIUM SERPL-MCNC: 9.7 MG/DL
CALCIUM SERPL-MCNC: 9.7 MG/DL
CHLORIDE SERPL-SCNC: 105 MMOL/L
CHLORIDE SERPL-SCNC: 105 MMOL/L
CHOLEST SERPL-MCNC: 190 MG/DL
CHOLEST/HDLC SERPL: 3.4 {RATIO}
CO2 SERPL-SCNC: 28 MMOL/L
CO2 SERPL-SCNC: 28 MMOL/L
CREAT SERPL-MCNC: 1.5 MG/DL
CREAT SERPL-MCNC: 1.5 MG/DL
DIFFERENTIAL METHOD: ABNORMAL
EOSINOPHIL # BLD AUTO: 0 K/UL
EOSINOPHIL NFR BLD: 0.4 %
ERYTHROCYTE [DISTWIDTH] IN BLOOD BY AUTOMATED COUNT: 13.3 %
EST. GFR  (AFRICAN AMERICAN): 56.1 ML/MIN/1.73 M^2
EST. GFR  (AFRICAN AMERICAN): 56.1 ML/MIN/1.73 M^2
EST. GFR  (NON AFRICAN AMERICAN): 48.5 ML/MIN/1.73 M^2
EST. GFR  (NON AFRICAN AMERICAN): 48.5 ML/MIN/1.73 M^2
ESTIMATED AVG GLUCOSE: 197 MG/DL
GLUCOSE SERPL-MCNC: 164 MG/DL
GLUCOSE SERPL-MCNC: 164 MG/DL
HBA1C MFR BLD HPLC: 8.5 %
HCT VFR BLD AUTO: 43.2 %
HDLC SERPL-MCNC: 56 MG/DL
HDLC SERPL: 29.5 %
HGB BLD-MCNC: 13.5 G/DL
IMM GRANULOCYTES # BLD AUTO: 0.08 K/UL
IMM GRANULOCYTES NFR BLD AUTO: 0.9 %
LDLC SERPL CALC-MCNC: 112.2 MG/DL
LYMPHOCYTES # BLD AUTO: 1.1 K/UL
LYMPHOCYTES NFR BLD: 12.6 %
MCH RBC QN AUTO: 25.8 PG
MCHC RBC AUTO-ENTMCNC: 31.3 G/DL
MCV RBC AUTO: 82 FL
MONOCYTES # BLD AUTO: 0.8 K/UL
MONOCYTES NFR BLD: 9.2 %
NEUTROPHILS # BLD AUTO: 6.5 K/UL
NEUTROPHILS NFR BLD: 76.5 %
NONHDLC SERPL-MCNC: 134 MG/DL
NRBC BLD-RTO: 0 /100 WBC
PLATELET # BLD AUTO: 205 K/UL
PMV BLD AUTO: 11.3 FL
POTASSIUM SERPL-SCNC: 4 MMOL/L
POTASSIUM SERPL-SCNC: 4 MMOL/L
PROT SERPL-MCNC: 7.1 G/DL
RBC # BLD AUTO: 5.24 M/UL
SODIUM SERPL-SCNC: 142 MMOL/L
SODIUM SERPL-SCNC: 142 MMOL/L
TRIGL SERPL-MCNC: 109 MG/DL
WBC # BLD AUTO: 8.47 K/UL

## 2017-12-12 PROCEDURE — 83036 HEMOGLOBIN GLYCOSYLATED A1C: CPT

## 2017-12-12 PROCEDURE — 85025 COMPLETE CBC W/AUTO DIFF WBC: CPT

## 2017-12-12 PROCEDURE — 80061 LIPID PANEL: CPT

## 2017-12-12 PROCEDURE — 80197 ASSAY OF TACROLIMUS: CPT

## 2017-12-12 PROCEDURE — 80053 COMPREHEN METABOLIC PANEL: CPT

## 2017-12-12 PROCEDURE — 36415 COLL VENOUS BLD VENIPUNCTURE: CPT | Mod: PO

## 2017-12-13 LAB — TACROLIMUS BLD-MCNC: 6.2 NG/ML

## 2018-01-02 DIAGNOSIS — Z94.0 KIDNEY REPLACED BY TRANSPLANT: ICD-10-CM

## 2018-01-02 RX ORDER — TACROLIMUS 1 MG/1
CAPSULE ORAL
Qty: 270 CAPSULE | Refills: 9 | Status: SHIPPED | OUTPATIENT
Start: 2018-01-02 | End: 2018-11-30

## 2018-02-07 DIAGNOSIS — Z94.0 KIDNEY REPLACED BY TRANSPLANT: ICD-10-CM

## 2018-02-07 RX ORDER — KETOCONAZOLE 200 MG/1
TABLET ORAL
Qty: 15 TABLET | Refills: 10 | Status: SHIPPED | OUTPATIENT
Start: 2018-02-07 | End: 2019-01-06 | Stop reason: SDUPTHER

## 2018-02-20 RX ORDER — PEN NEEDLE, DIABETIC 31 GX5/16"
NEEDLE, DISPOSABLE MISCELLANEOUS
Qty: 200 EACH | Refills: 5 | OUTPATIENT
Start: 2018-02-20

## 2018-02-26 ENCOUNTER — TELEPHONE (OUTPATIENT)
Dept: INTERNAL MEDICINE | Facility: CLINIC | Age: 63
End: 2018-02-26

## 2018-02-26 RX ORDER — PEN NEEDLE, DIABETIC 31 GX5/16"
NEEDLE, DISPOSABLE MISCELLANEOUS
Qty: 100 EACH | Refills: 1 | Status: SHIPPED | OUTPATIENT
Start: 2018-03-02 | End: 2019-09-23

## 2018-02-26 NOTE — TELEPHONE ENCOUNTER
----- Message from Danae Ac sent at 2/26/2018 11:41 AM CST -----  Contact: pt daughter Marybeth  Pt needs a refill on his needles, was told by the pharmacist he needs to see the doctor first  The daughter can be reached at [phone] Thanks

## 2018-03-02 DIAGNOSIS — Z79.4 TYPE 2 DIABETES MELLITUS WITH COMPLICATION, WITH LONG-TERM CURRENT USE OF INSULIN: ICD-10-CM

## 2018-03-02 DIAGNOSIS — E11.21 TYPE 2 DIABETES MELLITUS WITH DIABETIC NEPHROPATHY, WITH LONG-TERM CURRENT USE OF INSULIN: Primary | ICD-10-CM

## 2018-03-02 DIAGNOSIS — Z79.4 TYPE 2 DIABETES MELLITUS WITH DIABETIC NEPHROPATHY, WITH LONG-TERM CURRENT USE OF INSULIN: Primary | ICD-10-CM

## 2018-03-02 DIAGNOSIS — E11.8 TYPE 2 DIABETES MELLITUS WITH COMPLICATION, WITH LONG-TERM CURRENT USE OF INSULIN: ICD-10-CM

## 2018-03-02 RX ORDER — FAMOTIDINE 20 MG/1
TABLET, FILM COATED ORAL
Qty: 30 TABLET | Refills: 0 | Status: SHIPPED | OUTPATIENT
Start: 2018-03-02 | End: 2018-04-02 | Stop reason: SDUPTHER

## 2018-03-02 RX ORDER — ROSUVASTATIN CALCIUM 40 MG/1
TABLET, COATED ORAL
Qty: 30 TABLET | Refills: 0 | Status: SHIPPED | OUTPATIENT
Start: 2018-03-02 | End: 2018-04-02 | Stop reason: SDUPTHER

## 2018-03-02 NOTE — TELEPHONE ENCOUNTER
Call pt and book lab in [facility] for this weekend or Monday and see me next Thursday or Friday.

## 2018-03-05 DIAGNOSIS — E11.21 TYPE II DIABETES MELLITUS WITH NEPHROPATHY: ICD-10-CM

## 2018-03-05 RX ORDER — INSULIN GLARGINE 100 [IU]/ML
INJECTION, SOLUTION SUBCUTANEOUS
Qty: 30 ML | Refills: 0 | Status: SHIPPED | OUTPATIENT
Start: 2018-03-05 | End: 2018-05-02 | Stop reason: SDUPTHER

## 2018-03-05 NOTE — TELEPHONE ENCOUNTER
Call patient and schedule lab work in [facility] to be done this week and see me 3 days later

## 2018-03-05 NOTE — TELEPHONE ENCOUNTER
calld pt to inform him that his medication has been approved. also scheduled labs and F/u appt Evelyn Whittaker

## 2018-03-06 ENCOUNTER — LAB VISIT (OUTPATIENT)
Dept: LAB | Facility: HOSPITAL | Age: 63
End: 2018-03-06
Attending: UROLOGY
Payer: COMMERCIAL

## 2018-03-06 DIAGNOSIS — Z79.4 TYPE 2 DIABETES MELLITUS WITH COMPLICATION, WITH LONG-TERM CURRENT USE OF INSULIN: ICD-10-CM

## 2018-03-06 DIAGNOSIS — E11.8 TYPE 2 DIABETES MELLITUS WITH COMPLICATION, WITH LONG-TERM CURRENT USE OF INSULIN: ICD-10-CM

## 2018-03-06 DIAGNOSIS — N40.0 BENIGN PROSTATIC HYPERPLASIA: ICD-10-CM

## 2018-03-06 LAB
ALBUMIN SERPL BCP-MCNC: 3.7 G/DL
ALP SERPL-CCNC: 72 U/L
ALT SERPL W/O P-5'-P-CCNC: 20 U/L
ANION GAP SERPL CALC-SCNC: 12 MMOL/L
AST SERPL-CCNC: 16 U/L
BASOPHILS # BLD AUTO: 0.03 K/UL
BASOPHILS NFR BLD: 0.4 %
BILIRUB SERPL-MCNC: 0.5 MG/DL
BUN SERPL-MCNC: 17 MG/DL
CALCIUM SERPL-MCNC: 9.1 MG/DL
CHLORIDE SERPL-SCNC: 106 MMOL/L
CHOLEST SERPL-MCNC: 166 MG/DL
CHOLEST/HDLC SERPL: 3.1 {RATIO}
CO2 SERPL-SCNC: 26 MMOL/L
CREAT SERPL-MCNC: 1.4 MG/DL
DIFFERENTIAL METHOD: ABNORMAL
EOSINOPHIL # BLD AUTO: 0 K/UL
EOSINOPHIL NFR BLD: 0.6 %
ERYTHROCYTE [DISTWIDTH] IN BLOOD BY AUTOMATED COUNT: 13.6 %
EST. GFR  (AFRICAN AMERICAN): >60 ML/MIN/1.73 M^2
EST. GFR  (NON AFRICAN AMERICAN): 52.7 ML/MIN/1.73 M^2
ESTIMATED AVG GLUCOSE: 258 MG/DL
GLUCOSE SERPL-MCNC: 151 MG/DL
HBA1C MFR BLD HPLC: 10.6 %
HCT VFR BLD AUTO: 42.7 %
HDLC SERPL-MCNC: 54 MG/DL
HDLC SERPL: 32.5 %
HGB BLD-MCNC: 13 G/DL
IMM GRANULOCYTES # BLD AUTO: 0.07 K/UL
IMM GRANULOCYTES NFR BLD AUTO: 1 %
LDLC SERPL CALC-MCNC: 91.4 MG/DL
LYMPHOCYTES # BLD AUTO: 1 K/UL
LYMPHOCYTES NFR BLD: 14 %
MCH RBC QN AUTO: 26 PG
MCHC RBC AUTO-ENTMCNC: 30.4 G/DL
MCV RBC AUTO: 85 FL
MONOCYTES # BLD AUTO: 0.7 K/UL
MONOCYTES NFR BLD: 9.7 %
NEUTROPHILS # BLD AUTO: 5.3 K/UL
NEUTROPHILS NFR BLD: 74.3 %
NONHDLC SERPL-MCNC: 112 MG/DL
NRBC BLD-RTO: 0 /100 WBC
PLATELET # BLD AUTO: 189 K/UL
PMV BLD AUTO: 11.1 FL
POTASSIUM SERPL-SCNC: 3.8 MMOL/L
PROT SERPL-MCNC: 6.6 G/DL
RBC # BLD AUTO: 5 M/UL
SODIUM SERPL-SCNC: 144 MMOL/L
TRIGL SERPL-MCNC: 103 MG/DL
TSH SERPL DL<=0.005 MIU/L-ACNC: 0.63 UIU/ML
WBC # BLD AUTO: 7.13 K/UL

## 2018-03-06 PROCEDURE — 85025 COMPLETE CBC W/AUTO DIFF WBC: CPT

## 2018-03-06 PROCEDURE — 84443 ASSAY THYROID STIM HORMONE: CPT

## 2018-03-06 PROCEDURE — 84153 ASSAY OF PSA TOTAL: CPT

## 2018-03-06 PROCEDURE — 83036 HEMOGLOBIN GLYCOSYLATED A1C: CPT

## 2018-03-06 PROCEDURE — 80061 LIPID PANEL: CPT

## 2018-03-06 PROCEDURE — 80053 COMPREHEN METABOLIC PANEL: CPT

## 2018-03-06 PROCEDURE — 36415 COLL VENOUS BLD VENIPUNCTURE: CPT

## 2018-03-07 LAB — COMPLEXED PSA SERPL-MCNC: 0.25 NG/ML

## 2018-03-09 ENCOUNTER — OFFICE VISIT (OUTPATIENT)
Dept: INTERNAL MEDICINE | Facility: CLINIC | Age: 63
End: 2018-03-09
Payer: COMMERCIAL

## 2018-03-09 VITALS
TEMPERATURE: 98 F | HEART RATE: 82 BPM | WEIGHT: 291.88 LBS | SYSTOLIC BLOOD PRESSURE: 138 MMHG | OXYGEN SATURATION: 97 % | BODY MASS INDEX: 45.81 KG/M2 | DIASTOLIC BLOOD PRESSURE: 76 MMHG | HEIGHT: 67 IN | RESPIRATION RATE: 16 BRPM

## 2018-03-09 DIAGNOSIS — N18.9 ANEMIA ASSOCIATED WITH CHRONIC RENAL FAILURE: ICD-10-CM

## 2018-03-09 DIAGNOSIS — D63.1 ANEMIA ASSOCIATED WITH CHRONIC RENAL FAILURE: ICD-10-CM

## 2018-03-09 DIAGNOSIS — R76.8 HEPATITIS C ANTIBODY POSITIVE IN BLOOD: ICD-10-CM

## 2018-03-09 DIAGNOSIS — G47.33 OBSTRUCTIVE SLEEP APNEA SYNDROME: ICD-10-CM

## 2018-03-09 DIAGNOSIS — N20.0 NEPHROLITHIASIS: ICD-10-CM

## 2018-03-09 DIAGNOSIS — I25.10 CORONARY ARTERY DISEASE INVOLVING NATIVE CORONARY ARTERY OF NATIVE HEART WITHOUT ANGINA PECTORIS: ICD-10-CM

## 2018-03-09 DIAGNOSIS — E11.22 HYPERTENSION ASSOCIATED WITH STAGE 3 CHRONIC KIDNEY DISEASE DUE TO TYPE 2 DIABETES MELLITUS: ICD-10-CM

## 2018-03-09 DIAGNOSIS — Z29.89 PROPHYLACTIC IMMUNOTHERAPY: Chronic | ICD-10-CM

## 2018-03-09 DIAGNOSIS — N25.81 SECONDARY HYPERPARATHYROIDISM OF RENAL ORIGIN: ICD-10-CM

## 2018-03-09 DIAGNOSIS — E11.21 TYPE 2 DIABETES MELLITUS WITH DIABETIC NEPHROPATHY, WITH LONG-TERM CURRENT USE OF INSULIN: ICD-10-CM

## 2018-03-09 DIAGNOSIS — Z86.010 HISTORY OF COLON POLYPS: ICD-10-CM

## 2018-03-09 DIAGNOSIS — N18.30 HYPERTENSION ASSOCIATED WITH STAGE 3 CHRONIC KIDNEY DISEASE DUE TO TYPE 2 DIABETES MELLITUS: ICD-10-CM

## 2018-03-09 DIAGNOSIS — I12.9 HYPERTENSION ASSOCIATED WITH STAGE 3 CHRONIC KIDNEY DISEASE DUE TO TYPE 2 DIABETES MELLITUS: ICD-10-CM

## 2018-03-09 DIAGNOSIS — Z79.4 TYPE 2 DIABETES MELLITUS WITH DIABETIC NEPHROPATHY, WITH LONG-TERM CURRENT USE OF INSULIN: ICD-10-CM

## 2018-03-09 DIAGNOSIS — E11.42 DIABETIC PERIPHERAL NEUROPATHY: ICD-10-CM

## 2018-03-09 DIAGNOSIS — N18.30 CKD (CHRONIC KIDNEY DISEASE) STAGE 3, GFR 30-59 ML/MIN: ICD-10-CM

## 2018-03-09 DIAGNOSIS — Z79.4 TYPE 2 DIABETES MELLITUS WITH COMPLICATION, WITH LONG-TERM CURRENT USE OF INSULIN: ICD-10-CM

## 2018-03-09 DIAGNOSIS — Z94.0 KIDNEY TRANSPLANT STATUS, LIVING RELATED DONOR: Chronic | ICD-10-CM

## 2018-03-09 DIAGNOSIS — E11.8 TYPE 2 DIABETES MELLITUS WITH COMPLICATION, WITH LONG-TERM CURRENT USE OF INSULIN: ICD-10-CM

## 2018-03-09 DIAGNOSIS — Z00.00 ROUTINE GENERAL MEDICAL EXAMINATION AT A HEALTH CARE FACILITY: Primary | ICD-10-CM

## 2018-03-09 PROCEDURE — 99396 PREV VISIT EST AGE 40-64: CPT | Mod: S$GLB,,, | Performed by: INTERNAL MEDICINE

## 2018-03-09 PROCEDURE — 99999 PR PBB SHADOW E&M-EST. PATIENT-LVL IV: CPT | Mod: PBBFAC,,, | Performed by: INTERNAL MEDICINE

## 2018-03-09 RX ORDER — METFORMIN HYDROCHLORIDE 500 MG/1
500 TABLET ORAL 2 TIMES DAILY WITH MEALS
Qty: 180 TABLET | Refills: 3 | Status: SHIPPED | OUTPATIENT
Start: 2018-03-09 | End: 2019-03-04 | Stop reason: SDUPTHER

## 2018-03-09 NOTE — PROGRESS NOTES
HPI:  Patient is a 62-year-old man who comes today for follow-up his diabetes, hypertension, lipids, coronary disease, congestive heart failure and status post renal transplant as well as for his annual physical.  Patient admits he has not been compliant with his diet and exercise.  Patient denies any hypoglycemic events.  His sugars been running much higher.  He only checks it once a day.  He denies any other new complaints at this time    Current MEDS: medcard review, verified and update  Allergies: Per the electronic medical record    Past Medical History:   Diagnosis Date    Acquired renal cyst of left kidney     Anemia associated with chronic renal failure     CAD (coronary artery disease)     nonobstructive [facility] 9/14    CHF (congestive heart failure)     Chronic immunosuppression with Prograf and MMF 6/18/2015    CKD (chronic kidney disease) stage 3, GFR 30-59 ml/min     Diabetic retinopathy     DM (diabetes mellitus), type 2 with complications 1994    Edema     End stage kidney disease     s/p transplant, doing well    Gallbladder polyp     Heart failure, diastolic, due to HTN     Hemodialysis status     off since transplant    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     History of colon polyps     HPTH (hyperparathyroidism)     Hyperlipidemia     Hypertension associated with stage 3 chronic kidney disease due to type 2 diabetes mellitus     Obesity     Proteinuria     resolved s/p transplant    S/P kidney transplant     Type 2 diabetes, uncontrolled, with retinopathy     Type II diabetes mellitus with renal manifestations        Past Surgical History:   Procedure Laterality Date    CARDIAC CATHETERIZATION  2008    normal coronary    KIDNEY TRANSPLANT      RETINAL LASER PROCEDURE         SHx: per the electronic medical record    FHx: recorded in the electronic medical record    ROS:    denies any chest pains or shortness of breath. Denies any nausea,  "vomiting or diarrhea. Denies any fever, chills or sweats. Denies any change in weight, voice, stool, skin or hair. Denies any dysuria, dyspepsia or dysphagia. Denies any change in vision, hearing or headaches. Denies any swollen lymph nodes or loss of memory.    PE:  /76   Pulse 82   Temp 97.5 °F (36.4 °C)   Resp 16   Ht 5' 7" (1.702 m)   Wt 132.4 kg (291 lb 14.2 oz)   SpO2 97%   BMI 45.72 kg/m²   Gen: Well-developed, well-nourished, male, in no acute distress, oriented x3  HEENT: neck is supple, no adenopathy, carotids 2+ equal without bruits, thyroid exam normal size without nodules.  CHEST: clear to auscultation and percussion  CVS: regular rate and rhythm without significant murmur, gallop, or rubs  ABD: soft, benign, no rebound no guarding, no distention.  Bowel sounds are normal.     nontender.  No palpable masses.  No organomegaly and no audible bruits.  RECTAL: Deferred  EXT: no clubbing, cyanosis, or edema  LYMPH: no cervical, inguinal, or axillary adenopathy  FEET: Decreased sensation of sensation.  Both lower extremities  No ulcers or pressure sores.    NEURO: gait normal.  Cranial nerves II- XII intact. No nystagmus.  Speech normal.   Gross motor unremarkable.    Lab Results   Component Value Date    WBC 7.13 03/06/2018    HGB 13.0 (L) 03/06/2018    HCT 42.7 03/06/2018     03/06/2018    CHOL 166 03/06/2018    TRIG 103 03/06/2018    HDL 54 03/06/2018    ALT 20 03/06/2018    AST 16 03/06/2018     03/06/2018    K 3.8 03/06/2018     03/06/2018    CREATININE 1.4 03/06/2018    BUN 17 03/06/2018    CO2 26 03/06/2018    TSH 0.628 03/06/2018    PSA 0.25 03/06/2018    INR 1.0 06/18/2015    HGBA1C 10.6 (H) 03/06/2018       Impression:  Diabetes, extremely poorly controlled  Other medical problems below, stable  Patient Active Problem List   Diagnosis    Anemia associated with chronic renal failure    Obesity    Acquired renal cyst of left kidney    Nephrolithiasis    Sleep apnea "    Pseudophakia    CAD (coronary artery disease)    Living-related donor kidney transplant (daughter) - 6/12/15    Diabetic peripheral neuropathy    Chronic immunosuppression with Prograf, MMF and prednisone    Secondary hyperparathyroidism of renal origin    CKD (chronic kidney disease) stage 3, GFR 30-59 ml/min    Type II diabetes mellitus with renal manifestations    Hypertension associated with stage 3 chronic kidney disease due to type 2 diabetes mellitus    Right sided abdominal pain    Constipation, unspecified    Nocturia    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    DM (diabetes mellitus), type 2 with complications    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, with long-term current use of insulin    Epiretinal membrane (ERM) of both eyes    History of colon polyps       Plan:   Orders Placed This Encounter    Hemoglobin A1c    Lipid panel    Comprehensive metabolic panel    Ambulatory consult to Diabetic Education    metFORMIN (GLUCOPHAGE) 500 MG tablet    Case request GI: COLONOSCOPY     Patient was started on low dose metformin 500 mg twice a day.  He'll have repeat lab work in 3 months.  He's been encouraged to exercise daily for 45 minutes.  He needs to be compliant with his diet.  I want to go back to see diabetic education to reemphasize the need for appropriate diet.  Patient is due for his colonoscopy.

## 2018-03-13 RX ORDER — SODIUM, POTASSIUM,MAG SULFATES 17.5-3.13G
SOLUTION, RECONSTITUTED, ORAL ORAL
Qty: 354 ML | Refills: 0 | Status: SHIPPED | OUTPATIENT
Start: 2018-03-13 | End: 2018-06-29

## 2018-03-26 RX ORDER — TAMSULOSIN HYDROCHLORIDE 0.4 MG/1
CAPSULE ORAL
Qty: 30 CAPSULE | Refills: 11 | Status: SHIPPED | OUTPATIENT
Start: 2018-03-26 | End: 2018-04-04 | Stop reason: SDUPTHER

## 2018-04-02 RX ORDER — FAMOTIDINE 20 MG/1
TABLET, FILM COATED ORAL
Qty: 30 TABLET | Refills: 11 | Status: SHIPPED | OUTPATIENT
Start: 2018-04-02 | End: 2019-04-26

## 2018-04-02 RX ORDER — ROSUVASTATIN CALCIUM 40 MG/1
TABLET, COATED ORAL
Qty: 30 TABLET | Refills: 11 | Status: SHIPPED | OUTPATIENT
Start: 2018-04-02 | End: 2019-05-01 | Stop reason: SDUPTHER

## 2018-04-04 ENCOUNTER — OFFICE VISIT (OUTPATIENT)
Dept: UROLOGY | Facility: CLINIC | Age: 63
End: 2018-04-04
Payer: COMMERCIAL

## 2018-04-04 VITALS — HEIGHT: 67 IN | BODY MASS INDEX: 45.01 KG/M2 | WEIGHT: 286.81 LBS

## 2018-04-04 DIAGNOSIS — N40.0 BENIGN PROSTATIC HYPERPLASIA, UNSPECIFIED WHETHER LOWER URINARY TRACT SYMPTOMS PRESENT: Primary | ICD-10-CM

## 2018-04-04 DIAGNOSIS — Z12.5 PROSTATE CANCER SCREENING: ICD-10-CM

## 2018-04-04 PROCEDURE — 99999 PR PBB SHADOW E&M-EST. PATIENT-LVL II: CPT | Mod: PBBFAC,,, | Performed by: UROLOGY

## 2018-04-04 PROCEDURE — 99214 OFFICE O/P EST MOD 30 MIN: CPT | Mod: S$GLB,,, | Performed by: UROLOGY

## 2018-04-04 RX ORDER — TAMSULOSIN HYDROCHLORIDE 0.4 MG/1
1 CAPSULE ORAL DAILY
Qty: 30 CAPSULE | Refills: 11 | Status: SHIPPED | OUTPATIENT
Start: 2018-04-04 | End: 2019-04-26

## 2018-04-04 RX ORDER — TAMSULOSIN HYDROCHLORIDE 0.4 MG/1
1 CAPSULE ORAL DAILY
Qty: 30 CAPSULE | Refills: 11 | Status: SHIPPED | OUTPATIENT
Start: 2018-04-04 | End: 2018-04-04 | Stop reason: SDUPTHER

## 2018-04-04 RX ORDER — FINASTERIDE 5 MG/1
5 TABLET, FILM COATED ORAL DAILY
Qty: 30 TABLET | Refills: 11 | Status: SHIPPED | OUTPATIENT
Start: 2018-04-04 | End: 2022-02-25

## 2018-04-04 NOTE — PROGRESS NOTES
Chief Complaint: BPH?    HPI:   4/4/18: No complaints has not started the finasteride; didn't get it last year.  PSA remains low.  3/6/17: Voiding a lot better on flomax, nocturia x1-2.  1/30/17: 62 yo man had a kidney transplant 6/15 and the stent was removed 3 weeks later; he was told his bladder was really full.  No abd/pelvic pain and no exac/rel factors.  No hematuria.  No urolithiasis.  Weak stream, no hesitancy, doesn't feel he empties all the way, nocturia x8-9.  Is supposed to use a CPAP but it got flooded out.  No other urinary bother. Nocturia x4-5 on CPAP; drinks a lot of water including in the evening.  Referred by Dr. Gonsalez.    Allergies:  Lisinopril; Actos  [pioglitazone]; and Metformin    Medications:  has a current medication list which includes the following prescription(s): amlodipine, aspirin, bd insulin pen needle uf mini, bisacodyl, blood sugar diagnostic, famotidine, furosemide, glimepiride, insulin aspart u-100, ketoconazole, lancets, lantus solostar u-100 insulin, magnesium oxide, metformin, metoprolol succinate, multivitamin, mycophenolate, omega-3 fatty acids-vitamin e, pen needle, diabetic, prednisone, prograf, rosuvastatin, sodium,potassium,mag sulfates, tamsulosin, vitamin d2, and humalog kwikpen insulin.    Review of Systems:  General: No fever, chills, fatigability, or weight loss.  Skin: No rashes, itching, or changes in color or texture of skin.  Chest: Denies STEELE, cyanosis, wheezing, cough, and sputum production.  Abdomen: Appetite fine. No weight loss. Denies diarrhea, abdominal pain, hematemesis, or blood in stool.  Musculoskeletal: No joint stiffness or swelling. Denies back pain.  : As above.  All other review of systems negative.    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    PSH:   has a past surgical history that includes Retinal laser procedure; Cardiac catheterization (2008); and Kidney transplant.    FamHx: family history includes Diabetes in his maternal grandmother, mother, and sister; Heart failure in his mother; Hypertension in his mother; Kidney disease in his sister.    SocHx:  reports that he has never smoked. He quit smokeless tobacco use about 4 years ago. He reports that he uses drugs, including Marijuana, about 2 times per week. He reports that he does not drink alcohol.      Physical Exam:  There were no vitals filed for this visit.  General: A&Ox3, no apparent distress, no deformities  Neck: No masses, normal thyroid  Lungs: normal inspiration, no use of accessory muscles  Heart: normal pulse, no arrhythmias  Abdomen: Soft, NT, ND  Skin: The skin is warm and dry. No jaundice.  Ext: No c/c/e.  :  1/17:  Test desc jamie, no abnormalities of epididymus. Penis normal, with normal penile and scrotal skin. Meatus normal. Normal rectal tone, no hemorrhoids. Prost 40 gm no nodules or masses appreciated. SV not palpable. Perineum and anus normal.    Labs/Studies:   Urinalysis performed in clinic, summary: UA normal exc 50 glucose  Bladder Scan performed in office:     1/17: PVR 42 ml.  PSA    12/16: 0.26    3/18: 0.25    Impression/Plan:   1. Has symptomatic BPH.  Continue flomax add  finasteride.  RTC PSA 1 yr.

## 2018-04-05 ENCOUNTER — HOSPITAL ENCOUNTER (OUTPATIENT)
Facility: HOSPITAL | Age: 63
Discharge: HOME | End: 2018-04-05
Attending: FAMILY MEDICINE | Admitting: FAMILY MEDICINE
Payer: COMMERCIAL

## 2018-04-05 ENCOUNTER — SURGERY (OUTPATIENT)
Age: 63
End: 2018-04-05

## 2018-04-05 ENCOUNTER — ANESTHESIA (OUTPATIENT)
Dept: ENDOSCOPY | Facility: HOSPITAL | Age: 63
End: 2018-04-05
Payer: COMMERCIAL

## 2018-04-05 ENCOUNTER — ANESTHESIA EVENT (OUTPATIENT)
Dept: ENDOSCOPY | Facility: HOSPITAL | Age: 63
End: 2018-04-05
Payer: COMMERCIAL

## 2018-04-05 VITALS
HEART RATE: 119 BPM | TEMPERATURE: 98 F | OXYGEN SATURATION: 98 % | BODY MASS INDEX: 44.1 KG/M2 | WEIGHT: 281 LBS | SYSTOLIC BLOOD PRESSURE: 134 MMHG | DIASTOLIC BLOOD PRESSURE: 96 MMHG | HEIGHT: 67 IN | RESPIRATION RATE: 16 BRPM

## 2018-04-05 DIAGNOSIS — K63.5 POLYP OF COLON, UNSPECIFIED PART OF COLON, UNSPECIFIED TYPE: ICD-10-CM

## 2018-04-05 DIAGNOSIS — N40.0 BENIGN PROSTATIC HYPERPLASIA WITHOUT LOWER URINARY TRACT SYMPTOMS: ICD-10-CM

## 2018-04-05 DIAGNOSIS — Z86.010 HISTORY OF COLON POLYPS: Primary | ICD-10-CM

## 2018-04-05 LAB — POCT GLUCOSE: 128 MG/DL (ref 70–110)

## 2018-04-05 PROCEDURE — 37000009 HC ANESTHESIA EA ADD 15 MINS: Performed by: FAMILY MEDICINE

## 2018-04-05 PROCEDURE — 88305 TISSUE EXAM BY PATHOLOGIST: CPT | Mod: 26,,, | Performed by: PATHOLOGY

## 2018-04-05 PROCEDURE — 45380 COLONOSCOPY AND BIOPSY: CPT | Mod: 59,,, | Performed by: FAMILY MEDICINE

## 2018-04-05 PROCEDURE — 27201089 HC SNARE, DISP (ANY): Performed by: FAMILY MEDICINE

## 2018-04-05 PROCEDURE — 45380 COLONOSCOPY AND BIOPSY: CPT | Performed by: FAMILY MEDICINE

## 2018-04-05 PROCEDURE — 88305 TISSUE EXAM BY PATHOLOGIST: CPT | Performed by: PATHOLOGY

## 2018-04-05 PROCEDURE — 25000003 PHARM REV CODE 250: Performed by: FAMILY MEDICINE

## 2018-04-05 PROCEDURE — 45385 COLONOSCOPY W/LESION REMOVAL: CPT | Performed by: FAMILY MEDICINE

## 2018-04-05 PROCEDURE — 45385 COLONOSCOPY W/LESION REMOVAL: CPT | Mod: 33,,, | Performed by: FAMILY MEDICINE

## 2018-04-05 PROCEDURE — 37000008 HC ANESTHESIA 1ST 15 MINUTES: Performed by: FAMILY MEDICINE

## 2018-04-05 PROCEDURE — 27201012 HC FORCEPS, HOT/COLD, DISP: Performed by: FAMILY MEDICINE

## 2018-04-05 PROCEDURE — 82962 GLUCOSE BLOOD TEST: CPT | Performed by: FAMILY MEDICINE

## 2018-04-05 PROCEDURE — 25000003 PHARM REV CODE 250: Performed by: NURSE ANESTHETIST, CERTIFIED REGISTERED

## 2018-04-05 PROCEDURE — 63600175 PHARM REV CODE 636 W HCPCS: Performed by: NURSE ANESTHETIST, CERTIFIED REGISTERED

## 2018-04-05 RX ORDER — GLYCOPYRROLATE 0.2 MG/ML
INJECTION INTRAMUSCULAR; INTRAVENOUS
Status: DISCONTINUED | OUTPATIENT
Start: 2018-04-05 | End: 2018-04-05

## 2018-04-05 RX ORDER — ESMOLOL HYDROCHLORIDE 10 MG/ML
INJECTION INTRAVENOUS
Status: DISCONTINUED | OUTPATIENT
Start: 2018-04-05 | End: 2018-04-05

## 2018-04-05 RX ORDER — SODIUM CHLORIDE, SODIUM LACTATE, POTASSIUM CHLORIDE, CALCIUM CHLORIDE 600; 310; 30; 20 MG/100ML; MG/100ML; MG/100ML; MG/100ML
INJECTION, SOLUTION INTRAVENOUS CONTINUOUS
Status: DISCONTINUED | OUTPATIENT
Start: 2018-04-05 | End: 2018-04-05 | Stop reason: HOSPADM

## 2018-04-05 RX ORDER — PROPOFOL 10 MG/ML
VIAL (ML) INTRAVENOUS
Status: DISCONTINUED | OUTPATIENT
Start: 2018-04-05 | End: 2018-04-05

## 2018-04-05 RX ADMIN — PROPOFOL 50 MG: 10 INJECTION, EMULSION INTRAVENOUS at 08:04

## 2018-04-05 RX ADMIN — SODIUM CHLORIDE, SODIUM LACTATE, POTASSIUM CHLORIDE, AND CALCIUM CHLORIDE: .6; .31; .03; .02 INJECTION, SOLUTION INTRAVENOUS at 08:04

## 2018-04-05 RX ADMIN — GLYCOPYRROLATE 0.2 MG: 0.2 INJECTION INTRAMUSCULAR; INTRAVENOUS at 08:04

## 2018-04-05 RX ADMIN — ESMOLOL HYDROCHLORIDE 20 MG: 10 INJECTION, SOLUTION INTRAVENOUS at 08:04

## 2018-04-05 NOTE — PROVATION PATIENT INSTRUCTIONS
Discharge Summary/Instructions after an Endoscopic Procedure  Patient Name: Mitch Whittaker  Patient MRN: [id number]  Patient YOB: 1955 Thursday, April 05, 2018 Chava Ronquillo MD  RESTRICTIONS:  During your procedure today, you received medications for sedation.  These   medications may affect your judgment, balance and coordination.  Therefore,   for 24 hours, you have the following restrictions:   - DO NOT drive a car, operate machinery, make legal/financial decisions,   sign important papers or drink alcohol.    ACTIVITY:  The following day: return to full activity including work, except no heavy   lifting, straining or running for 3 days if polyps were removed.  DIET:  Eat and drink normally unless instructed otherwise.     TREATMENT FOR COMMON SIDE EFFECTS:  - Mild abdominal pain, nausea, belching, bloating or excessive gas:  rest,   eat lightly and use a heating pad.  - Sore Throat: treat with throat lozenges and/or gargle with warm salt   water.  - Because air was used during the procedure, expelling large amounts of air   from your rectum or belching is normal.  - If a bowel prep was taken, you may not have a bowel movement for 1-3 days.    This is normal.  SYMPTOMS TO WATCH FOR AND REPORT TO YOUR PHYSICIAN:  1. Abdominal pain or bloating, other than gas cramps.  2. Chest pain.  3. Back pain.  4. Signs of infection such as: chills or fever occurring within 24 hours   after the procedure.  5. Rectal bleeding, which would show as bright red, maroon, or black stools.   (A tablespoon of blood from the rectum is not serious, especially if   hemorrhoids are present.)  6. Vomiting.  7. Weakness or dizziness.  GO DIRECTLY TO THE NEAREST EMERGENCY ROOM IF YOU HAVE ANY OF THE FOLLOWING:      Difficulty breathing              Chills and/or fever over 101 F   Persistent vomiting and/or vomiting blood   Severe abdominal pain   Severe chest pain   Black, tarry stools   Bleeding- more than one  tablespoon   Any other symptom or condition that you feel may need urgent attention  Your doctor recommends these additional instructions:  If any biopsies were taken, your doctors clinic will contact you in 1 to 2   weeks with any results.  - Patient has a contact number available for emergencies.  The signs and   symptoms of potential delayed complications were discussed with the   patient.  Return to normal activities tomorrow.  Written discharge   instructions were provided to the patient.   - Resume previous diet.   - Continue present medications.   - Await pathology results.   - Repeat colonoscopy in 3 years for surveillance.   - Telephone my office for pathology results in 1 week.   - Discharge patient to home (via wheelchair).  For questions, problems or results please call your physician Chava Ronquillo MD at Work:  [phone]  If you have any questions about the above instructions, call the GI   department at [phone] or call the endoscopy unit at [phone]   from 7am until 3 pm.  [facility] [place], EMERGENCY ROOM PHONE NUMBER:   [phone]  IF A COMPLICATION OR EMERGENCY SITUATION ARISES AND YOU ARE UNABLE TO REACH   YOUR PHYSICIAN - GO DIRECTLY TO THE EMERGENCY ROOM.  I have read or have had read to me these discharge instructions for my   procedure and have received a written copy.  I understand these   instructions and will follow-up with my physician if I have any questions.     __________________________________       _____________________________________  Nurse Signature                                          Patient/Designated   Responsible Party Signature  Chava Ronquillo MD  4/5/2018 8:50:57 AM  This report has been verified and signed electronically.

## 2018-04-05 NOTE — ANESTHESIA POSTPROCEDURE EVALUATION
"Anesthesia Post Evaluation    Patient: Mitch Whittaker    Procedure(s) Performed: Procedure(s) (LRB):  COLONOSCOPY (N/A)    Final Anesthesia Type: MAC  Patient location during evaluation: GI PACU  Patient participation: Yes- Able to Participate  Level of consciousness: awake and alert  Post-procedure vital signs: reviewed and stable  Pain management: adequate  Airway patency: patent  PONV status at discharge: No PONV  Anesthetic complications: no      Cardiovascular status: hemodynamically stable  Respiratory status: unassisted, spontaneous ventilation and room air  Hydration status: euvolemic  Follow-up not needed.        Visit Vitals  BP (!) 134/96 (Patient Position: Sitting)   Pulse (!) 119   Temp 36.6 °C (97.8 °F) (Oral)   Resp 16   Ht 5' 7" (1.702 m)   Wt 127.5 kg (281 lb)   SpO2 98%   BMI 44.01 kg/m²       Pain/Lakeisha Score: Pain Assessment Performed: Yes (4/5/2018  9:20 AM)  Presence of Pain: denies (4/5/2018  9:20 AM)  Lakeisha Score: 10 (4/5/2018  9:20 AM)      "

## 2018-04-05 NOTE — DISCHARGE SUMMARY
" Endoscopy Discharge Summary      Admit Date: 4/5/2018    Discharge Date and Time:  4/5/2018 8:52 AM    Attending Physician: Chava Ronquillo MD     Discharge Physician: Chava Ronquillo MD     Principal Admitting Diagnoses: History of colon polyps         Discharge Diagnosis: The primary encounter diagnosis was History of colon polyps. Diagnoses of Polyp of colon, unspecified part of colon, unspecified type and Benign prostatic hyperplasia without lower urinary tract symptoms were also pertinent to this visit.     Discharged Condition: Good    Indication for Admission: History of colon polyps     Hospital Course: Patient was admitted for an inpatient procedure and tolerated the procedure well with no complications.    Significant Diagnostic Studies: fulguration of polyp, Colonoscopy with cold biopsy polypectomy, Colonoscopy with cold snare polypectomy and Colonoscopy with hot snare polypectomy    Pathology (if any):  Specimen (12h ago through future)    Start     Ordered    04/05/18 0851  Specimen to Pathology - Surgery  Once     Comments:  1. Descending colon polyps2. Sigmoid polyps      04/05/18 0851          Estimated Blood Loss: 1 ml.    Discussed with: patient and family.    Disposition: Home.    Follow Up/Patient Instructions:   Current Discharge Medication List      CONTINUE these medications which have NOT CHANGED    Details   amLODIPine (NORVASC) 2.5 MG tablet Take 1 tablet (2.5 mg total) by mouth once daily.  Qty: 30 tablet, Refills: 11      BD INSULIN PEN NEEDLE UF MINI 31 gauge x 3/16" Ndle Use prn  Qty: 100 each, Refills: 1      bisacodyl (DULCOLAX) 5 mg EC tablet Take 5 mg by mouth daily as needed for Constipation.      blood sugar diagnostic Strp To test four times per day  Qty: 150 strip, Refills: 11      famotidine (PEPCID) 20 MG tablet TAKE ONE TABLET BY MOUTH EVERY EVENING  Qty: 30 tablet, Refills: 11      finasteride (PROSCAR) 5 mg tablet Take 1 tablet (5 mg total) by mouth once daily.  Qty: 30 " tablet, Refills: 11      furosemide (LASIX) 40 MG tablet Take 1 tablet (40 mg total) by mouth daily as needed.  Qty: 15 tablet, Refills: 11      glimepiride (AMARYL) 2 MG tablet TAKE 1 TABLET BY MOUTH DAILY BEFORE BREAKFAST  Qty: 30 tablet, Refills: 11      insulin aspart (NOVOLOG FLEXPEN) 100 unit/mL InPn pen 20 plus: <130=0, 130-150=4, 150-200=10,  200-250=15, 251-300=20, 300-350=25, 351-4000=30, >402=072 with each meal Min15- 20 units TID  Qty: 6 Box, Refills: 3    Associated Diagnoses: Type II diabetes mellitus with nephropathy      ketoconazole (NIZORAL) 200 mg Tab TAKE HALF TABLET (100 MG TOTAL) BY MOUTH ONCE DAILY  Qty: 15 tablet, Refills: 10    Associated Diagnoses: Kidney replaced by transplant      lancets 33 gauge Misc 1 Stick by Misc.(Non-Drug; Combo Route) route as directed. Contour lancets. Use to check BG 2-3 times daily.  Qty: 150 each, Refills: 11    Comments: Contour lancets  Associated Diagnoses: Type II or unspecified type diabetes mellitus with renal manifestations, uncontrolled(250.42)      LANTUS SOLOSTAR U-100 INSULIN 100 unit/mL (3 mL) InPn pen INJECT 30 UNITS INTO THE SKIN TWO TIMES A DAY  Qty: 30 mL, Refills: 0    Associated Diagnoses: Type II diabetes mellitus with nephropathy      magnesium oxide (MAG-OX) 400 mg tablet TAKE 2 TABLETS BY MOUTH TWO TIMES A DAY  Qty: 120 tablet, Refills: 11      metFORMIN (GLUCOPHAGE) 500 MG tablet Take 1 tablet (500 mg total) by mouth 2 (two) times daily with meals.  Qty: 180 tablet, Refills: 3      metoprolol succinate (TOPROL-XL) 25 MG 24 hr tablet TAKE TWO TABLETS BY MOUTH ONCE DAILY  Qty: 60 tablet, Refills: 11      multivitamin (THERAGRAN) tablet Take 1 tablet by mouth once daily.      mycophenolate (CELLCEPT) 250 mg Cap TAKE THREE CAPSULES BY MOUTH TWICE A DAY  Qty: 180 capsule, Refills: 11    Associated Diagnoses: Kidney replaced by transplant      omega-3 fatty acids-vitamin E (FISH OIL) 1,000 mg Cap Take 1 capsule by mouth once daily.       pen  "needle, diabetic (BD INSULIN PEN NEEDLE UF SHORT) 31 gauge x 5/16" Ndle USE TO INJECT INSULIN TWICE A DAY  Qty: 200 each, Refills: 5      predniSONE (DELTASONE) 5 MG tablet TAKE ONE TABLET BY MOUTH EVERY DAY  Qty: 30 tablet, Refills: 11    Associated Diagnoses: Kidney replaced by transplant      PROGRAF 1 mg Cap TAKE 5 CAPSULES BY MOUTH EVERY MORNING AND 4 CAPSULES EVERY EVENING  Qty: 270 capsule, Refills: 9    Associated Diagnoses: Kidney replaced by transplant      rosuvastatin (CRESTOR) 40 MG Tab TAKE ONE TABLET BY MOUTH EVERY EVENING  Qty: 30 tablet, Refills: 11      sodium,potassium,mag sulfates (SUPREP BOWEL PREP KIT) 17.5-3.13-1.6 gram SolR As directed  Qty: 354 mL, Refills: 0      tamsulosin (FLOMAX) 0.4 mg Cp24 Take 1 capsule (0.4 mg total) by mouth once daily.  Qty: 30 capsule, Refills: 11      VITAMIN D2 50,000 unit capsule TAKE ONE CAPSULE BY MOUTH EVERY 7 DAYS  Qty: 13 capsule, Refills: 3      aspirin (ECOTRIN) 81 MG EC tablet Take 1 tablet by mouth Daily.                Discharge Procedure Orders  Diet general     Activity as tolerated     Call MD for:  temperature >100.4     Call MD for:  persistent nausea and vomiting     Call MD for:  severe uncontrolled pain     Call MD for:  difficulty breathing, headache or visual disturbances     Call MD for:  redness, tenderness, or signs of infection (pain, swelling, redness, odor or green/yellow discharge around incision site)     Call MD for:  hives     Call MD for:  persistent dizziness or light-headedness     No dressing needed         Follow-up Information     Call Chava Ronquillo MD.    Specialty:  Family Medicine  Why:  To receive pathology results.  Contact information:  [address] [facility] [address] [phone]                   [email or web address]      "

## 2018-04-05 NOTE — TRANSFER OF CARE
"Anesthesia Transfer of Care Note    Patient: Mitch Whittaker    Procedure(s) Performed: Procedure(s) (LRB):  COLONOSCOPY (N/A)    Patient location: PACU    Anesthesia Type: MAC    Transport from OR: Transported from OR on room air with adequate spontaneous ventilation    Post pain: adequate analgesia    Post assessment: no apparent anesthetic complications    Post vital signs: stable    Level of consciousness: awake    Nausea/Vomiting: no nausea/vomiting    Complications: none    Transfer of care protocol was followed      Last vitals:   Visit Vitals  BP (!) 138/92 (BP Location: Left arm, Patient Position: Lying)   Pulse 106   Temp 36.6 °C (97.8 °F) (Oral)   Resp 18   Ht 5' 7" (1.702 m)   Wt 127.5 kg (281 lb)   SpO2 97%   BMI 44.01 kg/m²     "

## 2018-04-05 NOTE — ANESTHESIA RELEASE NOTE
"Anesthesia Release from PACU Note    Patient: Mitch Whittaker    Procedure(s) Performed: Procedure(s) (LRB):  COLONOSCOPY (N/A)    Anesthesia type: MAC    Post pain: Adequate analgesia    Post assessment: no apparent anesthetic complications, tolerated procedure well and no evidence of recall    Last Vitals:   Visit Vitals  BP (!) 134/96 (Patient Position: Sitting)   Pulse (!) 119   Temp 36.6 °C (97.8 °F) (Oral)   Resp 16   Ht 5' 7" (1.702 m)   Wt 127.5 kg (281 lb)   SpO2 98%   BMI 44.01 kg/m²       Post vital signs: stable    Level of consciousness: awake and alert     Nausea/Vomiting: no nausea/no vomiting    Complications: none    Airway Patency: patent    Respiratory: unassisted, spontaneous ventilation, room air    Cardiovascular: stable and blood pressure at baseline    Hydration: euvolemic  "

## 2018-04-05 NOTE — ANESTHESIA PREPROCEDURE EVALUATION
Patient Active Problem List   Diagnosis    Anemia associated with chronic renal failure    Obesity    Acquired renal cyst of left kidney    Nephrolithiasis    Sleep apnea    Pseudophakia    CAD (coronary artery disease)    Living-related donor kidney transplant (daughter) - 6/12/15    Diabetic peripheral neuropathy    Chronic immunosuppression with Prograf, MMF and prednisone    Secondary hyperparathyroidism of renal origin    CKD (chronic kidney disease) stage 3, GFR 30-59 ml/min    Type II diabetes mellitus with renal manifestations    Hypertension associated with stage 3 chronic kidney disease due to type 2 diabetes mellitus    Right sided abdominal pain    Constipation, unspecified    Nocturia    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    DM (diabetes mellitus), type 2 with complications    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, with long-term current use of insulin    Epiretinal membrane (ERM) of both eyes    History of colon polyps                                                                                                                 04/05/2018  Mitch Whittaker is a 62 y.o., male.    Anesthesia Evaluation    I have reviewed the Patient Summary Reports.    I have reviewed the Nursing Notes.   I have reviewed the Medications.     Review of Systems  Anesthesia Hx:  No problems with previous Anesthesia    Cardiovascular:   Hypertension CAD   CHF    Pulmonary:   Sleep Apnea    Renal/:   Chronic Renal Disease (s/p renal transplant), CRI    Hepatic/GI:   Obesity; history of colon polyps   Endocrine:   Diabetes        Physical Exam  General:  Obesity    Airway/Jaw/Neck:  Airway Findings: Mallampati: III      Chest/Lungs:  Chest/Lungs Findings: Clear to auscultation, Normal Respiratory Rate     Heart/Vascular:  Heart Findings: Rate: Normal  Rhythm: Regular Rhythm             Anesthesia Plan  Type of Anesthesia, risks & benefits  discussed:  Anesthesia Type:  MAC  Patient's Preference:   Intra-op Monitoring Plan:   Intra-op Monitoring Plan Comments:   Post Op Pain Control Plan:   Post Op Pain Control Plan Comments:   Induction:    Beta Blocker:  Patient is on a Beta-Blocker and has received one dose within the past 24 hours (No further documentation required).       Informed Consent: Patient understands risks and agrees with Anesthesia plan.  Questions answered.   ASA Score: 3     Day of Surgery Review of History & Physical: I have interviewed and examined the patient. I have reviewed the patient's H&P dated:  There are no significant changes.          Ready For Surgery From Anesthesia Perspective.

## 2018-04-10 DIAGNOSIS — Z94.0 KIDNEY REPLACED BY TRANSPLANT: Primary | ICD-10-CM

## 2018-04-11 NOTE — PROGRESS NOTES
Dear Cornelio Ham MD,    I recently cared for Mitch Whittaker and performed an endoscopy.  Tissue was sent for pathology evaluation and I will have a letter written to ask the patient to repeat the colonoscopy in 3 years.  The pathology showed that there was serrated tissue present.  Thank you for allowing me to participate in the care of your patient.  Please call me for any questions that you might have.      Dr. Chava Ronquillo  [phone] cell  [phone] office      NURSING STAFF:Please  inform the patient that I reviewed the recent pathology obtained at the time of colonoscopy.    The results showed that there was serrated tissue present which is benign and based on that, I recommend that the patient have a repeat colonoscopy performed in 3 years.     If the patient has MyChart, this message has been sent to them.  Confirm that they read the note.  If not, copy the information and print a letter to send to the patient at this time.  Confirm that a notation to the PCP was done.      Dear Mitch Whittaker,    This is to inform you that I have reviewed your recent colonoscopy pathology.  The results showed that you had serrated tissue present which is benign and based on that, I recommend that you have a repeat colonoscopy performed in 3 years.      Dr. Chava Ronquillo  [phone]

## 2018-04-20 ENCOUNTER — OFFICE VISIT (OUTPATIENT)
Dept: OPHTHALMOLOGY | Facility: CLINIC | Age: 63
End: 2018-04-20
Payer: COMMERCIAL

## 2018-04-20 DIAGNOSIS — E11.3553 TYPE 2 DIABETES MELLITUS WITH STABLE PROLIFERATIVE RETINOPATHY OF BOTH EYES, WITH LONG-TERM CURRENT USE OF INSULIN: Primary | ICD-10-CM

## 2018-04-20 DIAGNOSIS — Z79.4 TYPE 2 DIABETES MELLITUS WITH STABLE PROLIFERATIVE RETINOPATHY OF BOTH EYES, WITH LONG-TERM CURRENT USE OF INSULIN: Primary | ICD-10-CM

## 2018-04-20 PROCEDURE — 92014 COMPRE OPH EXAM EST PT 1/>: CPT | Mod: S$GLB,,, | Performed by: OPHTHALMOLOGY

## 2018-04-20 PROCEDURE — 99999 PR PBB SHADOW E&M-EST. PATIENT-LVL II: CPT | Mod: PBBFAC,,, | Performed by: OPHTHALMOLOGY

## 2018-04-20 NOTE — PROGRESS NOTES
===============================  04/20/2018   Mitch Whittaker,   62 y.o. male   Last visit [facility]: :3/27/2017   Last visit eye dept. Visit date not found  [facility]:  Uncorrected distance visual acuity was 20/25 in the right eye and 20/25 in the left eye.  Tonometry     Tonometry (Applanation, 11:19 AM)       Right Left    Pressure 19 16               Not recorded         Not recorded        Chief Complaint   Patient presents with    Diabetic Eye Exam     yearly diabetic exam        HPI     Diabetic Eye Exam    Additional comments: yearly diabetic exam           Comments   DM since approx 1996  PDR S/P PRP OU  Old Focal OU  Old OS TRD  ERM OU  PCIOL OU  KIDNEY TRANSPLANT 6/12/15       Last edited by Kathie Boyd on 4/20/2018 11:05 AM. (History)          ________________  4/20/2018  Problem List Items Addressed This Visit        Eye/Vision problems    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, with long-term current use of insulin - Primary    Overview     DM since 1996  H/o PRP and Focal OU  OS TRD  H/O Kidney transplant 6/12/15  A1C 10.2 2/17             allegic symtoms  rec zadiotor   ats   pciol ou   sp prp ou   Dry ey sytomes    rc zadotr bid prn and STa    dont wipe fornx    .       ===========================

## 2018-05-02 DIAGNOSIS — E11.21 TYPE II DIABETES MELLITUS WITH NEPHROPATHY: ICD-10-CM

## 2018-05-03 RX ORDER — INSULIN GLARGINE 100 [IU]/ML
INJECTION, SOLUTION SUBCUTANEOUS
Qty: 30 ML | Refills: 11 | Status: SHIPPED | OUTPATIENT
Start: 2018-05-03 | End: 2018-10-02

## 2018-06-01 ENCOUNTER — PATIENT OUTREACH (OUTPATIENT)
Dept: ADMINISTRATIVE | Facility: HOSPITAL | Age: 63
End: 2018-06-01

## 2018-06-08 ENCOUNTER — LAB VISIT (OUTPATIENT)
Dept: LAB | Facility: HOSPITAL | Age: 63
End: 2018-06-08
Attending: INTERNAL MEDICINE
Payer: COMMERCIAL

## 2018-06-08 DIAGNOSIS — E11.21 TYPE 2 DIABETES MELLITUS WITH DIABETIC NEPHROPATHY, WITH LONG-TERM CURRENT USE OF INSULIN: ICD-10-CM

## 2018-06-08 DIAGNOSIS — Z79.4 TYPE 2 DIABETES MELLITUS WITH DIABETIC NEPHROPATHY, WITH LONG-TERM CURRENT USE OF INSULIN: ICD-10-CM

## 2018-06-08 LAB
ALBUMIN SERPL BCP-MCNC: 3.9 G/DL
ALP SERPL-CCNC: 62 U/L
ALT SERPL W/O P-5'-P-CCNC: 17 U/L
ANION GAP SERPL CALC-SCNC: 9 MMOL/L
AST SERPL-CCNC: 17 U/L
BILIRUB SERPL-MCNC: 0.7 MG/DL
BUN SERPL-MCNC: 19 MG/DL
CALCIUM SERPL-MCNC: 9.3 MG/DL
CHLORIDE SERPL-SCNC: 106 MMOL/L
CHOLEST SERPL-MCNC: 146 MG/DL
CHOLEST/HDLC SERPL: 3 {RATIO}
CO2 SERPL-SCNC: 29 MMOL/L
CREAT SERPL-MCNC: 1.7 MG/DL
EST. GFR  (AFRICAN AMERICAN): 48.2 ML/MIN/1.73 M^2
EST. GFR  (NON AFRICAN AMERICAN): 41.7 ML/MIN/1.73 M^2
ESTIMATED AVG GLUCOSE: 192 MG/DL
GLUCOSE SERPL-MCNC: 99 MG/DL
HBA1C MFR BLD HPLC: 8.3 %
HDLC SERPL-MCNC: 49 MG/DL
HDLC SERPL: 33.6 %
LDLC SERPL CALC-MCNC: 71.8 MG/DL
NONHDLC SERPL-MCNC: 97 MG/DL
POTASSIUM SERPL-SCNC: 3.9 MMOL/L
PROT SERPL-MCNC: 6.9 G/DL
SODIUM SERPL-SCNC: 144 MMOL/L
TRIGL SERPL-MCNC: 126 MG/DL

## 2018-06-08 PROCEDURE — 80061 LIPID PANEL: CPT

## 2018-06-08 PROCEDURE — 80053 COMPREHEN METABOLIC PANEL: CPT

## 2018-06-08 PROCEDURE — 83036 HEMOGLOBIN GLYCOSYLATED A1C: CPT

## 2018-06-08 PROCEDURE — 36415 COLL VENOUS BLD VENIPUNCTURE: CPT | Mod: PO

## 2018-06-15 ENCOUNTER — OFFICE VISIT (OUTPATIENT)
Dept: INTERNAL MEDICINE | Facility: CLINIC | Age: 63
End: 2018-06-15
Payer: COMMERCIAL

## 2018-06-15 ENCOUNTER — TELEPHONE (OUTPATIENT)
Dept: INTERNAL MEDICINE | Facility: CLINIC | Age: 63
End: 2018-06-15

## 2018-06-15 VITALS
DIASTOLIC BLOOD PRESSURE: 80 MMHG | HEART RATE: 89 BPM | TEMPERATURE: 98 F | WEIGHT: 286.38 LBS | OXYGEN SATURATION: 96 % | SYSTOLIC BLOOD PRESSURE: 138 MMHG | HEIGHT: 69 IN | BODY MASS INDEX: 42.42 KG/M2

## 2018-06-15 DIAGNOSIS — N18.30 CKD (CHRONIC KIDNEY DISEASE) STAGE 3, GFR 30-59 ML/MIN: ICD-10-CM

## 2018-06-15 DIAGNOSIS — E11.42 DIABETIC PERIPHERAL NEUROPATHY: Primary | ICD-10-CM

## 2018-06-15 DIAGNOSIS — Z79.4 TYPE 2 DIABETES MELLITUS WITH COMPLICATION, WITH LONG-TERM CURRENT USE OF INSULIN: ICD-10-CM

## 2018-06-15 DIAGNOSIS — N18.9 ANEMIA ASSOCIATED WITH CHRONIC RENAL FAILURE: ICD-10-CM

## 2018-06-15 DIAGNOSIS — E11.22 HYPERTENSION ASSOCIATED WITH STAGE 3 CHRONIC KIDNEY DISEASE DUE TO TYPE 2 DIABETES MELLITUS: ICD-10-CM

## 2018-06-15 DIAGNOSIS — Z79.4 TYPE 2 DIABETES MELLITUS WITH STABLE PROLIFERATIVE RETINOPATHY OF BOTH EYES, WITH LONG-TERM CURRENT USE OF INSULIN: ICD-10-CM

## 2018-06-15 DIAGNOSIS — R07.9 CHEST PAIN, UNSPECIFIED TYPE: ICD-10-CM

## 2018-06-15 DIAGNOSIS — I12.9 HYPERTENSION ASSOCIATED WITH STAGE 3 CHRONIC KIDNEY DISEASE DUE TO TYPE 2 DIABETES MELLITUS: ICD-10-CM

## 2018-06-15 DIAGNOSIS — E11.3553 TYPE 2 DIABETES MELLITUS WITH STABLE PROLIFERATIVE RETINOPATHY OF BOTH EYES, WITH LONG-TERM CURRENT USE OF INSULIN: ICD-10-CM

## 2018-06-15 DIAGNOSIS — D63.1 ANEMIA ASSOCIATED WITH CHRONIC RENAL FAILURE: ICD-10-CM

## 2018-06-15 DIAGNOSIS — Z29.89 PROPHYLACTIC IMMUNOTHERAPY: Chronic | ICD-10-CM

## 2018-06-15 DIAGNOSIS — I25.10 CORONARY ARTERY DISEASE INVOLVING NATIVE CORONARY ARTERY OF NATIVE HEART WITHOUT ANGINA PECTORIS: ICD-10-CM

## 2018-06-15 DIAGNOSIS — N18.30 HYPERTENSION ASSOCIATED WITH STAGE 3 CHRONIC KIDNEY DISEASE DUE TO TYPE 2 DIABETES MELLITUS: ICD-10-CM

## 2018-06-15 DIAGNOSIS — E11.21 TYPE 2 DIABETES MELLITUS WITH DIABETIC NEPHROPATHY, WITH LONG-TERM CURRENT USE OF INSULIN: ICD-10-CM

## 2018-06-15 DIAGNOSIS — Z79.4 TYPE 2 DIABETES MELLITUS WITH DIABETIC NEPHROPATHY, WITH LONG-TERM CURRENT USE OF INSULIN: ICD-10-CM

## 2018-06-15 DIAGNOSIS — E11.8 TYPE 2 DIABETES MELLITUS WITH COMPLICATION, WITH LONG-TERM CURRENT USE OF INSULIN: ICD-10-CM

## 2018-06-15 PROCEDURE — 3079F DIAST BP 80-89 MM HG: CPT | Mod: CPTII,S$GLB,, | Performed by: INTERNAL MEDICINE

## 2018-06-15 PROCEDURE — 3008F BODY MASS INDEX DOCD: CPT | Mod: CPTII,S$GLB,, | Performed by: INTERNAL MEDICINE

## 2018-06-15 PROCEDURE — 99214 OFFICE O/P EST MOD 30 MIN: CPT | Mod: S$GLB,,, | Performed by: INTERNAL MEDICINE

## 2018-06-15 PROCEDURE — 99999 PR PBB SHADOW E&M-EST. PATIENT-LVL III: CPT | Mod: PBBFAC,,, | Performed by: INTERNAL MEDICINE

## 2018-06-15 PROCEDURE — 3045F PR MOST RECENT HEMOGLOBIN A1C LEVEL 7.0-9.0%: CPT | Mod: CPTII,S$GLB,, | Performed by: INTERNAL MEDICINE

## 2018-06-15 PROCEDURE — 93010 ELECTROCARDIOGRAM REPORT: CPT | Mod: S$GLB,,, | Performed by: INTERNAL MEDICINE

## 2018-06-15 PROCEDURE — 3075F SYST BP GE 130 - 139MM HG: CPT | Mod: CPTII,S$GLB,, | Performed by: INTERNAL MEDICINE

## 2018-06-15 PROCEDURE — 93005 ELECTROCARDIOGRAM TRACING: CPT | Mod: S$GLB,,, | Performed by: INTERNAL MEDICINE

## 2018-06-15 RX ORDER — GLIMEPIRIDE 4 MG/1
4 TABLET ORAL
Qty: 90 TABLET | Refills: 3 | Status: SHIPPED | OUTPATIENT
Start: 2018-06-15 | End: 2019-06-18 | Stop reason: SDUPTHER

## 2018-06-15 RX ORDER — AMLODIPINE BESYLATE 5 MG/1
5 TABLET ORAL DAILY
Qty: 30 TABLET | Refills: 11 | Status: CANCELLED | OUTPATIENT
Start: 2018-06-15 | End: 2019-06-15

## 2018-06-15 NOTE — TELEPHONE ENCOUNTER
----- Message from Keira Cool sent at 6/15/2018  1:28 PM CDT -----  Contact: Patient's daughter, Edward  Patient states that the pharmacy does not have the new script for amlodipine 5mg, please all him back at [phone]. Thank you

## 2018-06-15 NOTE — PROGRESS NOTES
"HPI:  Patient is a 62-year-old man comes today for follow-up of his diabetes.  His control has significantly improved.  He is going to from hemoglobin A1c of 10 point fine down to now being 8.3 over the last 3 months.  We reviewed his diabetic medications.  Patient still denies any significant hypoglycemia.  Recently he has been having exertional chest discomfort in the left side.  He describes it is a tightness sensation when walking.  A subsides whenever he stops.    Current meds have been verified and updated per the EMR  Exam:/80 (BP Location: Left arm, Patient Position: Sitting, BP Method: Large (Manual))   Pulse 89   Temp 98.1 °F (36.7 °C) (Tympanic)   Ht 5' 9" (1.753 m)   Wt 129.9 kg (286 lb 6 oz)   SpO2 96%   BMI 42.29 kg/m²   Exam deferred  EKG was normal  Lab Results   Component Value Date    WBC 7.13 03/06/2018    HGB 13.0 (L) 03/06/2018    HCT 42.7 03/06/2018     03/06/2018    CHOL 146 06/08/2018    TRIG 126 06/08/2018    HDL 49 06/08/2018    ALT 17 06/08/2018    AST 17 06/08/2018     06/08/2018    K 3.9 06/08/2018     06/08/2018    CREATININE 1.7 (H) 06/08/2018    BUN 19 06/08/2018    CO2 29 06/08/2018    TSH 0.628 03/06/2018    PSA 0.25 03/06/2018    INR 1.0 06/18/2015    HGBA1C 8.3 (H) 06/08/2018       Impression:  Exertional chest pain, very high risk individual.  Has a prior history CAD  Diabetes mellitus, control improved yet still not to goal  Chronic kidney disease, stage III, creatinine slightly increased.  Will need to monitor closely in regards to his metformin.  He is currently only taking 500 mg twice a day  Multiple other medical problems below, stable  Patient Active Problem List   Diagnosis    Anemia associated with chronic renal failure    Obesity    Acquired renal cyst of left kidney    Nephrolithiasis    Sleep apnea    Pseudophakia    CAD (coronary artery disease)    Living-related donor kidney transplant (daughter) - 6/12/15    Diabetic " peripheral neuropathy    Chronic immunosuppression with Prograf, MMF and prednisone    Secondary hyperparathyroidism of renal origin    CKD (chronic kidney disease) stage 3, GFR 30-59 ml/min    Type II diabetes mellitus with renal manifestations    Hypertension associated with stage 3 chronic kidney disease due to type 2 diabetes mellitus    Right sided abdominal pain    Constipation, unspecified    Nocturia    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    DM (diabetes mellitus), type 2 with complications    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, with long-term current use of insulin    Epiretinal membrane (ERM) of both eyes    History of colon polyps    Colon polyps    Benign prostatic hyperplasia without lower urinary tract symptoms       Plan:  Orders Placed This Encounter    NM Myocardial Perfusion Spect Multi Pharmacologic    Hemoglobin A1c    Basic metabolic panel    CBC auto differential    NM Multi Pharm Stress Cardiac Component    EKG 12-lead    glimepiride (AMARYL) 4 MG tablet     He will have his stress test done.  Patient will increase the Amaryl to 4 mg today.  He will see me in 3 months.

## 2018-06-20 ENCOUNTER — HOSPITAL ENCOUNTER (OUTPATIENT)
Dept: PULMONOLOGY | Facility: HOSPITAL | Age: 63
Discharge: HOME | End: 2018-06-20
Attending: INTERNAL MEDICINE
Payer: COMMERCIAL

## 2018-06-20 ENCOUNTER — HOSPITAL ENCOUNTER (OUTPATIENT)
Dept: RADIOLOGY | Facility: HOSPITAL | Age: 63
Discharge: HOME | End: 2018-06-20
Attending: INTERNAL MEDICINE
Payer: COMMERCIAL

## 2018-06-20 DIAGNOSIS — R07.9 CHEST PAIN, UNSPECIFIED TYPE: ICD-10-CM

## 2018-06-20 LAB — DIASTOLIC DYSFUNCTION: NO

## 2018-06-20 PROCEDURE — 78452 HT MUSCLE IMAGE SPECT MULT: CPT | Mod: 26,,, | Performed by: INTERNAL MEDICINE

## 2018-06-20 PROCEDURE — 93018 CV STRESS TEST I&R ONLY: CPT | Mod: ,,, | Performed by: INTERNAL MEDICINE

## 2018-06-20 PROCEDURE — 63600175 PHARM REV CODE 636 W HCPCS: Performed by: NURSE PRACTITIONER

## 2018-06-20 PROCEDURE — 93016 CV STRESS TEST SUPVJ ONLY: CPT | Mod: ,,, | Performed by: INTERNAL MEDICINE

## 2018-06-20 PROCEDURE — 93017 CV STRESS TEST TRACING ONLY: CPT

## 2018-06-20 PROCEDURE — A9502 TC99M TETROFOSMIN: HCPCS

## 2018-06-20 RX ORDER — REGADENOSON 0.08 MG/ML
0.4 INJECTION, SOLUTION INTRAVENOUS ONCE
Status: COMPLETED | OUTPATIENT
Start: 2018-06-20 | End: 2018-06-20

## 2018-06-20 RX ADMIN — REGADENOSON 0.4 MG: 0.08 INJECTION, SOLUTION INTRAVENOUS at 12:06

## 2018-06-22 ENCOUNTER — LAB VISIT (OUTPATIENT)
Dept: LAB | Facility: HOSPITAL | Age: 63
End: 2018-06-22
Payer: COMMERCIAL

## 2018-06-22 DIAGNOSIS — Z94.0 KIDNEY REPLACED BY TRANSPLANT: ICD-10-CM

## 2018-06-22 LAB
ALBUMIN SERPL BCP-MCNC: 3.8 G/DL
ALBUMIN SERPL BCP-MCNC: 3.8 G/DL
ALP SERPL-CCNC: 68 U/L
ALT SERPL W/O P-5'-P-CCNC: 21 U/L
ANION GAP SERPL CALC-SCNC: 12 MMOL/L
AST SERPL-CCNC: 17 U/L
BASOPHILS # BLD AUTO: 0.02 K/UL
BASOPHILS NFR BLD: 0.3 %
BILIRUB DIRECT SERPL-MCNC: 0.3 MG/DL
BILIRUB SERPL-MCNC: 0.6 MG/DL
BUN SERPL-MCNC: 21 MG/DL
CALCIUM SERPL-MCNC: 9.5 MG/DL
CHLORIDE SERPL-SCNC: 105 MMOL/L
CO2 SERPL-SCNC: 26 MMOL/L
CREAT SERPL-MCNC: 1.7 MG/DL
DIFFERENTIAL METHOD: ABNORMAL
EOSINOPHIL # BLD AUTO: 0 K/UL
EOSINOPHIL NFR BLD: 0.4 %
ERYTHROCYTE [DISTWIDTH] IN BLOOD BY AUTOMATED COUNT: 14.1 %
EST. GFR  (AFRICAN AMERICAN): 48.2 ML/MIN/1.73 M^2
EST. GFR  (NON AFRICAN AMERICAN): 41.7 ML/MIN/1.73 M^2
GLUCOSE SERPL-MCNC: 121 MG/DL
HCT VFR BLD AUTO: 42.7 %
HGB BLD-MCNC: 12.5 G/DL
IMM GRANULOCYTES # BLD AUTO: 0.07 K/UL
IMM GRANULOCYTES NFR BLD AUTO: 0.9 %
LYMPHOCYTES # BLD AUTO: 1.1 K/UL
LYMPHOCYTES NFR BLD: 13.6 %
MAGNESIUM SERPL-MCNC: 1.8 MG/DL
MCH RBC QN AUTO: 25.3 PG
MCHC RBC AUTO-ENTMCNC: 29.3 G/DL
MCV RBC AUTO: 86 FL
MONOCYTES # BLD AUTO: 0.7 K/UL
MONOCYTES NFR BLD: 8.4 %
NEUTROPHILS # BLD AUTO: 6.1 K/UL
NEUTROPHILS NFR BLD: 76.4 %
NRBC BLD-RTO: 0 /100 WBC
PHOSPHATE SERPL-MCNC: 3.3 MG/DL
PLATELET # BLD AUTO: 209 K/UL
PMV BLD AUTO: 11.2 FL
POTASSIUM SERPL-SCNC: 4 MMOL/L
PROT SERPL-MCNC: 7 G/DL
RBC # BLD AUTO: 4.95 M/UL
SODIUM SERPL-SCNC: 143 MMOL/L
WBC # BLD AUTO: 7.96 K/UL

## 2018-06-22 PROCEDURE — 83735 ASSAY OF MAGNESIUM: CPT

## 2018-06-22 PROCEDURE — 80197 ASSAY OF TACROLIMUS: CPT

## 2018-06-22 PROCEDURE — 36415 COLL VENOUS BLD VENIPUNCTURE: CPT | Mod: PO

## 2018-06-22 PROCEDURE — 84075 ASSAY ALKALINE PHOSPHATASE: CPT

## 2018-06-22 PROCEDURE — 85025 COMPLETE CBC W/AUTO DIFF WBC: CPT

## 2018-06-22 PROCEDURE — 80069 RENAL FUNCTION PANEL: CPT

## 2018-06-22 PROCEDURE — 87799 DETECT AGENT NOS DNA QUANT: CPT

## 2018-06-23 LAB — TACROLIMUS BLD-MCNC: 6.6 NG/ML

## 2018-06-25 LAB
BKV DNA SERPL NAA+PROBE-ACNC: <125 COPIES/ML
BKV DNA SERPL NAA+PROBE-LOG#: <2.1 LOG (10) COPIES/ML
BKV DNA SERPL QL NAA+PROBE: NOT DETECTED

## 2018-06-29 ENCOUNTER — OFFICE VISIT (OUTPATIENT)
Dept: TRANSPLANT | Facility: CLINIC | Age: 63
End: 2018-06-29
Payer: COMMERCIAL

## 2018-06-29 VITALS
DIASTOLIC BLOOD PRESSURE: 81 MMHG | WEIGHT: 282.88 LBS | HEART RATE: 95 BPM | RESPIRATION RATE: 18 BRPM | HEIGHT: 68 IN | TEMPERATURE: 98 F | OXYGEN SATURATION: 95 % | SYSTOLIC BLOOD PRESSURE: 138 MMHG | BODY MASS INDEX: 42.87 KG/M2

## 2018-06-29 DIAGNOSIS — Z94.0 KIDNEY TRANSPLANT STATUS, LIVING RELATED DONOR: Primary | Chronic | ICD-10-CM

## 2018-06-29 DIAGNOSIS — N18.9 ANEMIA ASSOCIATED WITH CHRONIC RENAL FAILURE: ICD-10-CM

## 2018-06-29 DIAGNOSIS — I12.9 HYPERTENSION ASSOCIATED WITH STAGE 3 CHRONIC KIDNEY DISEASE DUE TO TYPE 2 DIABETES MELLITUS: ICD-10-CM

## 2018-06-29 DIAGNOSIS — I25.10 CORONARY ARTERY DISEASE INVOLVING NATIVE CORONARY ARTERY OF NATIVE HEART WITHOUT ANGINA PECTORIS: ICD-10-CM

## 2018-06-29 DIAGNOSIS — E66.01 CLASS 3 SEVERE OBESITY DUE TO EXCESS CALORIES WITH SERIOUS COMORBIDITY AND BODY MASS INDEX (BMI) OF 40.0 TO 44.9 IN ADULT: ICD-10-CM

## 2018-06-29 DIAGNOSIS — R76.8 HEPATITIS C ANTIBODY POSITIVE IN BLOOD: ICD-10-CM

## 2018-06-29 DIAGNOSIS — N25.81 SECONDARY HYPERPARATHYROIDISM OF RENAL ORIGIN: ICD-10-CM

## 2018-06-29 DIAGNOSIS — E11.21 TYPE 2 DIABETES MELLITUS WITH DIABETIC NEPHROPATHY, WITH LONG-TERM CURRENT USE OF INSULIN: ICD-10-CM

## 2018-06-29 DIAGNOSIS — E11.22 HYPERTENSION ASSOCIATED WITH STAGE 3 CHRONIC KIDNEY DISEASE DUE TO TYPE 2 DIABETES MELLITUS: ICD-10-CM

## 2018-06-29 DIAGNOSIS — D63.1 ANEMIA ASSOCIATED WITH CHRONIC RENAL FAILURE: ICD-10-CM

## 2018-06-29 DIAGNOSIS — Z79.4 TYPE 2 DIABETES MELLITUS WITH DIABETIC NEPHROPATHY, WITH LONG-TERM CURRENT USE OF INSULIN: ICD-10-CM

## 2018-06-29 DIAGNOSIS — N18.30 HYPERTENSION ASSOCIATED WITH STAGE 3 CHRONIC KIDNEY DISEASE DUE TO TYPE 2 DIABETES MELLITUS: ICD-10-CM

## 2018-06-29 DIAGNOSIS — Z29.89 PROPHYLACTIC IMMUNOTHERAPY: Chronic | ICD-10-CM

## 2018-06-29 DIAGNOSIS — N18.30 CKD (CHRONIC KIDNEY DISEASE) STAGE 3, GFR 30-59 ML/MIN: ICD-10-CM

## 2018-06-29 PROCEDURE — 3079F DIAST BP 80-89 MM HG: CPT | Mod: CPTII,S$GLB,, | Performed by: NURSE PRACTITIONER

## 2018-06-29 PROCEDURE — 3075F SYST BP GE 130 - 139MM HG: CPT | Mod: CPTII,S$GLB,, | Performed by: NURSE PRACTITIONER

## 2018-06-29 PROCEDURE — 3008F BODY MASS INDEX DOCD: CPT | Mod: CPTII,S$GLB,, | Performed by: NURSE PRACTITIONER

## 2018-06-29 PROCEDURE — 99999 PR PBB SHADOW E&M-EST. PATIENT-LVL V: CPT | Mod: PBBFAC,,, | Performed by: NURSE PRACTITIONER

## 2018-06-29 PROCEDURE — 3045F PR MOST RECENT HEMOGLOBIN A1C LEVEL 7.0-9.0%: CPT | Mod: CPTII,S$GLB,, | Performed by: NURSE PRACTITIONER

## 2018-06-29 PROCEDURE — 99215 OFFICE O/P EST HI 40 MIN: CPT | Mod: S$GLB,,, | Performed by: NURSE PRACTITIONER

## 2018-06-29 NOTE — LETTER
June 29, 2018        Bucky Danielle  [address]  [place] [address]  Phone: [phone]  Fax: [phone]             [facility]  [address]  [facility] [address]  Phone: [phone]   Patient: Mitch Whittaker   MR Number: [id number]   YOB: 1955   Date of Visit: 6/29/2018       Dear Dr. Bucky Danielle    Thank you for referring Mitch Whittaker to me for evaluation. Attached you will find relevant portions of my assessment and plan of care.    If you have questions, please do not hesitate to call me. I look forward to following Mitch Whittaker along with you.    Sincerely,    Gita Trevizo NP    Enclosure    If you would like to receive this communication electronically, please contact [email or web address] or [phone] to request [employer] access.    [employer] is a tool which provides read-only access to select patient information with whom you have a relationship. Its easy to use and provides real time access to review your patients record including encounter summaries, notes, results, and demographic information.    If you feel you have received this communication in error or would no longer like to receive these types of communications, please e-mail [email or web address]

## 2018-06-29 NOTE — PROGRESS NOTES
Kidney Post-Transplant Assessment    Referring Physician: Bucky Danielle  Current Nephrologist: Bucky Danielle    ORGAN: LEFT KIDNEY  Donor Type: living  PHS Increased Risk: no  Cold Ischemia: 44 mins  Induction Medications: thymoglobulin    Subjective:     CC:  Reassessment of renal allograft function and management of chronic immunosuppression.    HPI:  Mr. Whittaker is a 62 y.o. year old Black or  male who received a living kidney transplant on 6/12/15.  He has CKD stage 3 - GFR 30-59 and his baseline creatinine is between 1.6-1.8. He takes mycophenolate mofetil, prednisone and tacrolimus for maintenance immunosuppression.  Recent hospitalizations or ER visits since his previous clinic visit.  4/5/2018 colonoscopy     BP stable  Overall feels well. No health concerns today.   Denies chest pain, SOB, leg pain, abdominal pain or LUTs.  States he remains active and will do yard work. Has gained weight and has some  Lower extremity edema   Has not been f/u with general nephrology     Past Medical History:   Diagnosis Date    Acquired renal cyst of left kidney     Anemia associated with chronic renal failure     CAD (coronary artery disease)     nonobstructive [facility] 9/14    CHF (congestive heart failure)     Chronic immunosuppression with Prograf and MMF 6/18/2015    CKD (chronic kidney disease) stage 3, GFR 30-59 ml/min     Diabetic retinopathy     DM (diabetes mellitus), type 2 with complications 1994    Edema     End stage kidney disease     s/p transplant, doing well    Gallbladder polyp     Heart failure, diastolic, due to HTN     Hemodialysis status     off since transplant    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     History of colon polyps     HPTH (hyperparathyroidism)     Hyperlipidemia     Hypertension associated with stage 3 chronic kidney disease due to type 2 diabetes mellitus     Obesity     Proteinuria     resolved s/p transplant     "S/P kidney transplant     Type 2 diabetes, uncontrolled, with retinopathy     Type II diabetes mellitus with renal manifestations        Review of Systems   Constitutional: Negative for activity change, appetite change, chills, fatigue, fever and unexpected weight change.   HENT: Negative for congestion, facial swelling, postnasal drip, rhinorrhea, sinus pressure, sore throat and trouble swallowing.         Seasonal allergies   Eyes: Negative for pain, redness and visual disturbance.   Respiratory: Negative for cough, chest tightness, shortness of breath and wheezing.    Cardiovascular: Positive for leg swelling. Negative for chest pain and palpitations.   Gastrointestinal: Negative for abdominal pain, diarrhea, nausea and vomiting.   Genitourinary: Negative for dysuria, flank pain and urgency.   Musculoskeletal: Negative for gait problem, neck pain and neck stiffness.   Skin: Negative for rash.   Allergic/Immunologic: Positive for immunocompromised state. Negative for environmental allergies and food allergies.   Neurological: Negative for dizziness, weakness, light-headedness and headaches.   Psychiatric/Behavioral: Negative for agitation and confusion. The patient is not nervous/anxious.        Objective:   Blood pressure 138/81, pulse 95, temperature 97.9 °F (36.6 °C), temperature source Oral, resp. rate 18, height 5' 7.72" (1.72 m), weight 128.3 kg (282 lb 13.6 oz), SpO2 95 %.body mass index is 43.37 kg/m².    Physical Exam   Constitutional: He is oriented to person, place, and time. He appears well-developed and well-nourished.   HENT:   Head: Normocephalic.   Mouth/Throat: Oropharynx is clear and moist. No oropharyngeal exudate.   Eyes: Conjunctivae and EOM are normal. Pupils are equal, round, and reactive to light. No scleral icterus.   Neck: Normal range of motion. Neck supple.   Cardiovascular: Normal rate, regular rhythm and normal heart sounds.    Pulmonary/Chest: Effort normal and breath sounds " normal.   Abdominal: Soft. Normal appearance and bowel sounds are normal. He exhibits no distension and no mass. There is no splenomegaly or hepatomegaly. There is no tenderness. There is no rebound, no guarding, no CVA tenderness, no tenderness at McBurney's point and negative Durand's sign.       Musculoskeletal: Normal range of motion. He exhibits edema.   Bilateral peripheral edema + 1   Lymphadenopathy:     He has no cervical adenopathy.   Neurological: He is alert and oriented to person, place, and time. He exhibits normal muscle tone. Coordination normal.   Skin: Skin is warm and dry.   Psychiatric: He has a normal mood and affect. His behavior is normal.   Vitals reviewed.      Labs:  Lab Results   Component Value Date    WBC 7.96 06/22/2018    HGB 12.5 (L) 06/22/2018    HCT 42.7 06/22/2018     06/22/2018    K 4.0 06/22/2018     06/22/2018    CO2 26 06/22/2018    BUN 21 06/22/2018    CREATININE 1.7 (H) 06/22/2018    EGFRNONAA 41.7 (A) 06/22/2018    CALCIUM 9.5 06/22/2018    PHOS 3.3 06/22/2018    MG 1.8 06/22/2018    ALBUMIN 3.8 06/22/2018    ALBUMIN 3.8 06/22/2018    AST 17 06/22/2018    ALT 21 06/22/2018       No results found for: EXTANC, EXTWBC, EXTSEGS, EXTPLATELETS, EXTHEMOGLOBI, EXTHEMATOCRI, EXTCREATININ, EXTSODIUM, EXTPOTASSIUM, EXTBUN, EXTCO2, EXTCALCIUM, EXTPHOSPHORU, EXTGLUCOSE, EXTALBUMIN, EXTAST, EXTALT, EXTBILITOTAL, EXTLIPASE, EXTAMYLASE    No results found for: EXTCYCLOSLVL, EXTSIROLIMUS, EXTTACROLVL, EXTPROTCRE, EXTPTHINTACT, EXTPROTEINUA, EXTWBCUA, EXTRBCUA    Labs were reviewed with the patient.    Assessment:     1. Living-related donor kidney transplant (daughter) - 6/12/15    2. Chronic immunosuppression with Prograf, MMF and prednisone    3. Type 2 diabetes mellitus with diabetic nephropathy, with long-term current use of insulin    4. Class 3 severe obesity due to excess calories with serious comorbidity and body mass index (BMI) of 40.0 to 44.9 in adult    5. Secondary  hyperparathyroidism of renal origin    6. Hypertension associated with stage 3 chronic kidney disease due to type 2 diabetes mellitus    7. CKD (chronic kidney disease) stage 3, GFR 30-59 ml/min    8. Coronary artery disease involving native coronary artery of native heart without angina pectoris    9. Anemia associated with chronic renal failure    10. 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        Plan:   Needs to f/u f/u with a  General nephrologist --referral made  Check Vit D level     Follow-up:   1. CKD stage: 3 stable    2. Immunosuppression:   Prograf trough 6.6, which is  therapeutic target 4-6. Continue  Prograf 5/4, CSP910 Mg BID, and Prednisone 5 mg QD. Ketoconazole 100 mg QD  Will continue to monitor for drug toxicities    3. Allograft Function: Stable. Continue good po hydration.   Lab Results   Component Value Date    CREATININE 1.7 (H) 06/22/2018 6/22/2018  3yr 0mo   eGFR if African American >60 mL/min/1.73 m^2 48.2 (A)        4. Hypertension management: stable: advise low salt diet and home BP monitoring    Norvasc 5 mg QD, Toprol XL 25 mg BID      5. Metabolic Bone Disease/Secondary Hyperparathyroidism:stable  Will monitor PTH, CA and Vit D/guidelines,        6/22/2018  3yr 0mo   Magnesium 1.6 - 2.6 mg/dL 1.8     Lab Results   Component Value Date    .0 (H) 07/09/2015    CALCIUM 9.5 06/22/2018    PHOS 3.3 06/22/2018   Mg ox 400 mg BID, Ergocalciferol 50,000 Units / week--> as prescribed   Check Vit D with next labs  -->MGMT deferred to general nephrology      6. Electrolytes:  Will monitor /guidelines  Lab Results   Component Value Date     06/22/2018    K 4.0 06/22/2018     06/22/2018    CO2 26 06/22/2018       7. Anemia: stable. No need for intervention    Will monitor /guidelines  Lab Results   Component Value Date    WBC 7.96 06/22/2018    HGB 12.5 (L) 06/22/2018    HCT 42.7 06/22/2018    MCV 86 06/22/2018     06/22/2018       8.  Cytopenias: no significant  cytopenias will monitor as per our guidelines. Medicine list reviewed including potential causes of drug-induced cytopenias    9.Proteinuria: continue p/c ratio as per guidelines         6/22/2018  3yr 0mo   Prot/Creat Ratio, Ur 0.00 - 0.20 0.08     10. BK virus infection screening:  will continue to monitor/ guidelines      6/22/2018  3yr 0mo   BK Virus DNA, Blood Not detected Not detected       6/22/2018  3yr 0mo   BK Virus DNA PCR, Quant, Blood <125 Copies/mL <125     11. Weight education: provided during the clinic visit   Body mass index is 43.37 kg/m².  Encourage lifestyle modifications: diet, exercise, weight loss, low sodium diet/ 2 gms/day or less          12.Patient safety education regarding immunosuppression including prophylaxis posttransplant for CMV, PCP : Education provided about vaccination and prevention of infections            Follow-up:   Annual follow-up with kidney transplant clinic with repeat labs, including renal function panel with UA, urine protein/creatinine ratio, and drug trough level q3 months.  Patient also reminded to follow-up with general nephrologist.    Gita Trevizo NP       Education:   Material provided to the patient.  Patient reminded to call with any health changes since these can be early signs of significant complications.  Also, I advised the patient to be sure any new medications or changes of old medications are discussed with either a pharmacist or physician knowledgeable with transplant to avoid rejection/drug toxicity related to significant drug interactions.    UNOS Patient Status  Functional Status: 80% - Normal activity with effort: some symptoms of disease  Physical Capacity: No Limitations

## 2018-07-02 RX ORDER — INSULIN ASPART 100 [IU]/ML
INJECTION, SOLUTION INTRAVENOUS; SUBCUTANEOUS
Qty: 15 ML | Refills: 11 | Status: SHIPPED | OUTPATIENT
Start: 2018-07-02 | End: 2018-10-02 | Stop reason: SDUPTHER

## 2018-09-07 ENCOUNTER — LAB VISIT (OUTPATIENT)
Dept: LAB | Facility: HOSPITAL | Age: 63
End: 2018-09-07
Attending: INTERNAL MEDICINE
Payer: COMMERCIAL

## 2018-09-07 DIAGNOSIS — Z79.4 TYPE 2 DIABETES MELLITUS WITH COMPLICATION, WITH LONG-TERM CURRENT USE OF INSULIN: ICD-10-CM

## 2018-09-07 DIAGNOSIS — E11.8 TYPE 2 DIABETES MELLITUS WITH COMPLICATION, WITH LONG-TERM CURRENT USE OF INSULIN: ICD-10-CM

## 2018-09-07 LAB
ANION GAP SERPL CALC-SCNC: 8 MMOL/L
BASOPHILS # BLD AUTO: 0.01 K/UL
BASOPHILS NFR BLD: 0.1 %
BUN SERPL-MCNC: 13 MG/DL
CALCIUM SERPL-MCNC: 9.3 MG/DL
CHLORIDE SERPL-SCNC: 106 MMOL/L
CO2 SERPL-SCNC: 29 MMOL/L
CREAT SERPL-MCNC: 1.5 MG/DL
DIFFERENTIAL METHOD: ABNORMAL
EOSINOPHIL # BLD AUTO: 0 K/UL
EOSINOPHIL NFR BLD: 0.5 %
ERYTHROCYTE [DISTWIDTH] IN BLOOD BY AUTOMATED COUNT: 13.6 %
EST. GFR  (AFRICAN AMERICAN): 56.1 ML/MIN/1.73 M^2
EST. GFR  (NON AFRICAN AMERICAN): 48.5 ML/MIN/1.73 M^2
ESTIMATED AVG GLUCOSE: 197 MG/DL
GLUCOSE SERPL-MCNC: 104 MG/DL
HBA1C MFR BLD HPLC: 8.5 %
HCT VFR BLD AUTO: 43.8 %
HGB BLD-MCNC: 13.4 G/DL
IMM GRANULOCYTES # BLD AUTO: 0.08 K/UL
IMM GRANULOCYTES NFR BLD AUTO: 1 %
LYMPHOCYTES # BLD AUTO: 1.2 K/UL
LYMPHOCYTES NFR BLD: 15.5 %
MCH RBC QN AUTO: 26.1 PG
MCHC RBC AUTO-ENTMCNC: 30.6 G/DL
MCV RBC AUTO: 85 FL
MONOCYTES # BLD AUTO: 0.7 K/UL
MONOCYTES NFR BLD: 9.1 %
NEUTROPHILS # BLD AUTO: 5.7 K/UL
NEUTROPHILS NFR BLD: 73.8 %
NRBC BLD-RTO: 0 /100 WBC
PLATELET # BLD AUTO: 194 K/UL
PMV BLD AUTO: 11.8 FL
POTASSIUM SERPL-SCNC: 3.9 MMOL/L
RBC # BLD AUTO: 5.13 M/UL
SODIUM SERPL-SCNC: 143 MMOL/L
WBC # BLD AUTO: 7.67 K/UL

## 2018-09-07 PROCEDURE — 85025 COMPLETE CBC W/AUTO DIFF WBC: CPT

## 2018-09-07 PROCEDURE — 36415 COLL VENOUS BLD VENIPUNCTURE: CPT | Mod: PO

## 2018-09-07 PROCEDURE — 83036 HEMOGLOBIN GLYCOSYLATED A1C: CPT

## 2018-09-07 PROCEDURE — 80048 BASIC METABOLIC PNL TOTAL CA: CPT

## 2018-09-24 RX ORDER — METOPROLOL SUCCINATE 25 MG/1
TABLET, EXTENDED RELEASE ORAL
Qty: 60 TABLET | Refills: 0 | Status: SHIPPED | OUTPATIENT
Start: 2018-09-24 | End: 2018-10-08

## 2018-09-25 DIAGNOSIS — Z94.0 KIDNEY REPLACED BY TRANSPLANT: ICD-10-CM

## 2018-09-25 RX ORDER — MYCOPHENOLATE MOFETIL 250 MG/1
CAPSULE ORAL
Qty: 180 CAPSULE | Refills: 11 | Status: SHIPPED | OUTPATIENT
Start: 2018-09-25 | End: 2019-09-22 | Stop reason: SDUPTHER

## 2018-09-28 ENCOUNTER — TELEPHONE (OUTPATIENT)
Dept: INTERNAL MEDICINE | Facility: CLINIC | Age: 63
End: 2018-09-28

## 2018-09-28 DIAGNOSIS — Z94.0 KIDNEY REPLACED BY TRANSPLANT: ICD-10-CM

## 2018-09-28 RX ORDER — ERGOCALCIFEROL 1.25 MG/1
CAPSULE ORAL
Qty: 12 CAPSULE | Refills: 3 | Status: SHIPPED | OUTPATIENT
Start: 2018-09-28 | End: 2019-09-13

## 2018-09-28 RX ORDER — PREDNISONE 5 MG/1
5 TABLET ORAL DAILY
Qty: 30 TABLET | Refills: 11 | Status: SHIPPED | OUTPATIENT
Start: 2018-09-28 | End: 2019-10-01 | Stop reason: SDUPTHER

## 2018-09-28 NOTE — TELEPHONE ENCOUNTER
Called pt informed him of medication refill.  Pt voiced understanding and scheduled appointment 10/01/18.

## 2018-10-02 ENCOUNTER — OFFICE VISIT (OUTPATIENT)
Dept: INTERNAL MEDICINE | Facility: CLINIC | Age: 63
End: 2018-10-02
Payer: COMMERCIAL

## 2018-10-02 VITALS
RESPIRATION RATE: 16 BRPM | WEIGHT: 286.81 LBS | TEMPERATURE: 98 F | BODY MASS INDEX: 45.01 KG/M2 | HEIGHT: 67 IN | HEART RATE: 86 BPM | DIASTOLIC BLOOD PRESSURE: 70 MMHG | OXYGEN SATURATION: 97 % | SYSTOLIC BLOOD PRESSURE: 110 MMHG

## 2018-10-02 DIAGNOSIS — E11.3553 TYPE 2 DIABETES MELLITUS WITH STABLE PROLIFERATIVE RETINOPATHY OF BOTH EYES, WITH LONG-TERM CURRENT USE OF INSULIN: ICD-10-CM

## 2018-10-02 DIAGNOSIS — Z79.4 TYPE 2 DIABETES MELLITUS WITH COMPLICATION, WITH LONG-TERM CURRENT USE OF INSULIN: ICD-10-CM

## 2018-10-02 DIAGNOSIS — I25.10 CORONARY ARTERY DISEASE INVOLVING NATIVE CORONARY ARTERY OF NATIVE HEART WITHOUT ANGINA PECTORIS: ICD-10-CM

## 2018-10-02 DIAGNOSIS — E11.8 TYPE 2 DIABETES MELLITUS WITH COMPLICATION, WITH LONG-TERM CURRENT USE OF INSULIN: ICD-10-CM

## 2018-10-02 DIAGNOSIS — N18.30 CKD (CHRONIC KIDNEY DISEASE) STAGE 3, GFR 30-59 ML/MIN: ICD-10-CM

## 2018-10-02 DIAGNOSIS — I12.9 HYPERTENSION ASSOCIATED WITH STAGE 3 CHRONIC KIDNEY DISEASE DUE TO TYPE 2 DIABETES MELLITUS: ICD-10-CM

## 2018-10-02 DIAGNOSIS — Z79.4 TYPE 2 DIABETES MELLITUS WITH STABLE PROLIFERATIVE RETINOPATHY OF BOTH EYES, WITH LONG-TERM CURRENT USE OF INSULIN: ICD-10-CM

## 2018-10-02 DIAGNOSIS — Z94.0 KIDNEY TRANSPLANT STATUS, LIVING RELATED DONOR: Chronic | ICD-10-CM

## 2018-10-02 DIAGNOSIS — E11.42 DIABETIC PERIPHERAL NEUROPATHY: ICD-10-CM

## 2018-10-02 DIAGNOSIS — Z29.89 PROPHYLACTIC IMMUNOTHERAPY: Chronic | ICD-10-CM

## 2018-10-02 DIAGNOSIS — N18.30 HYPERTENSION ASSOCIATED WITH STAGE 3 CHRONIC KIDNEY DISEASE DUE TO TYPE 2 DIABETES MELLITUS: ICD-10-CM

## 2018-10-02 DIAGNOSIS — Z79.4 TYPE 2 DIABETES MELLITUS WITH DIABETIC NEPHROPATHY, WITH LONG-TERM CURRENT USE OF INSULIN: Primary | ICD-10-CM

## 2018-10-02 DIAGNOSIS — E11.22 HYPERTENSION ASSOCIATED WITH STAGE 3 CHRONIC KIDNEY DISEASE DUE TO TYPE 2 DIABETES MELLITUS: ICD-10-CM

## 2018-10-02 DIAGNOSIS — E11.21 TYPE 2 DIABETES MELLITUS WITH DIABETIC NEPHROPATHY, WITH LONG-TERM CURRENT USE OF INSULIN: Primary | ICD-10-CM

## 2018-10-02 DIAGNOSIS — E11.21 TYPE II DIABETES MELLITUS WITH NEPHROPATHY: ICD-10-CM

## 2018-10-02 DIAGNOSIS — D63.1 ANEMIA ASSOCIATED WITH CHRONIC RENAL FAILURE: ICD-10-CM

## 2018-10-02 DIAGNOSIS — N18.9 ANEMIA ASSOCIATED WITH CHRONIC RENAL FAILURE: ICD-10-CM

## 2018-10-02 PROCEDURE — 3045F PR MOST RECENT HEMOGLOBIN A1C LEVEL 7.0-9.0%: CPT | Mod: CPTII,S$GLB,, | Performed by: INTERNAL MEDICINE

## 2018-10-02 PROCEDURE — 90471 IMMUNIZATION ADMIN: CPT | Mod: S$GLB,,, | Performed by: INTERNAL MEDICINE

## 2018-10-02 PROCEDURE — 3008F BODY MASS INDEX DOCD: CPT | Mod: CPTII,S$GLB,, | Performed by: INTERNAL MEDICINE

## 2018-10-02 PROCEDURE — 90662 IIV NO PRSV INCREASED AG IM: CPT | Mod: S$GLB,,, | Performed by: INTERNAL MEDICINE

## 2018-10-02 PROCEDURE — 99213 OFFICE O/P EST LOW 20 MIN: CPT | Mod: 25,S$GLB,, | Performed by: INTERNAL MEDICINE

## 2018-10-02 PROCEDURE — 3078F DIAST BP <80 MM HG: CPT | Mod: CPTII,S$GLB,, | Performed by: INTERNAL MEDICINE

## 2018-10-02 PROCEDURE — 3074F SYST BP LT 130 MM HG: CPT | Mod: CPTII,S$GLB,, | Performed by: INTERNAL MEDICINE

## 2018-10-02 PROCEDURE — 99999 PR PBB SHADOW E&M-EST. PATIENT-LVL IV: CPT | Mod: PBBFAC,,, | Performed by: INTERNAL MEDICINE

## 2018-10-02 RX ORDER — INSULIN GLARGINE 100 [IU]/ML
40 INJECTION, SOLUTION SUBCUTANEOUS 2 TIMES DAILY
Qty: 30 ML | Refills: 11 | Status: SHIPPED | OUTPATIENT
Start: 2018-10-02 | End: 2019-03-19

## 2018-10-02 RX ORDER — INSULIN ASPART 100 [IU]/ML
25 INJECTION, SOLUTION INTRAVENOUS; SUBCUTANEOUS
Qty: 30 ML | Refills: 11 | Status: SHIPPED | OUTPATIENT
Start: 2018-10-02 | End: 2019-03-19

## 2018-10-02 NOTE — PROGRESS NOTES
"HPI:  Patient is a 62-year-old man who comes today for follow-up of his diabetes, hypertension, lipids, chronic kidney disease.  Patient has no complaints himself.  He denies any hypoglycemic events.  He states his blood pressures been well controlled.    Current meds have been verified and updated per the EMR  Exam:/70   Pulse 86   Temp 98.4 °F (36.9 °C)   Resp 16   Ht 5' 7" (1.702 m)   Wt 130.1 kg (286 lb 13.1 oz)   SpO2 97%   BMI 44.92 kg/m²   Exam deferred    Lab Results   Component Value Date    WBC 7.67 09/07/2018    HGB 13.4 (L) 09/07/2018    HCT 43.8 09/07/2018     09/07/2018    CHOL 146 06/08/2018    TRIG 126 06/08/2018    HDL 49 06/08/2018    ALT 21 06/22/2018    AST 17 06/22/2018     09/07/2018    K 3.9 09/07/2018     09/07/2018    CREATININE 1.5 (H) 09/07/2018    BUN 13 09/07/2018    CO2 29 09/07/2018    TSH 0.628 03/06/2018    PSA 0.25 03/06/2018    INR 1.0 06/18/2015    HGBA1C 8.5 (H) 09/07/2018       Impression:  Diabetes, not to goal  Multiple other medical problems below, stable  Patient Active Problem List   Diagnosis    Anemia associated with chronic renal failure    Obesity    Acquired renal cyst of left kidney    Nephrolithiasis    Sleep apnea    Pseudophakia    CAD (coronary artery disease)    Living-related donor kidney transplant (daughter) - 6/12/15    Diabetic peripheral neuropathy    Chronic immunosuppression with Prograf, MMF and prednisone    Secondary hyperparathyroidism of renal origin    CKD (chronic kidney disease) stage 3, GFR 30-59 ml/min    Type II diabetes mellitus with renal manifestations    Hypertension associated with stage 3 chronic kidney disease due to type 2 diabetes mellitus    Right sided abdominal pain    Constipation, unspecified    Nocturia    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    DM (diabetes mellitus), type 2 with complications    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, " with long-term current use of insulin    Epiretinal membrane (ERM) of both eyes    History of colon polyps    Colon polyps    Benign prostatic hyperplasia without lower urinary tract symptoms       Plan:  Orders Placed This Encounter    Influenza - High Dose (65+) (PF) (IM)    Hemoglobin A1c    Lipid panel    Comprehensive metabolic panel    TSH    CBC auto differential    Ambulatory consult to Nephrology    insulin glargine (LANTUS SOLOSTAR) 100 unit/mL (3 mL) InPn pen    insulin aspart U-100 (NOVOLOG FLEXPEN U-100 INSULIN) 100 unit/mL InPn pen     Patient was given high-dose influenza vaccine today.  Patient was referred to see Nephrology for long-term follow-up.  Patient will have his Lantus insulin increased to 40 units twice a day and then NovoLog 25 units with each meal.  He will see me again in 3 months with the above lab work.

## 2018-10-02 NOTE — PROGRESS NOTES
Administered flu shot to Left Deltoid.  See immunization record.  Pt tolerated well.  Advised to wait at least 15 minutes to monitor for adverse reactions.  Pt verbalizes understanding.

## 2018-10-08 RX ORDER — METOPROLOL SUCCINATE 25 MG/1
TABLET, EXTENDED RELEASE ORAL
Qty: 60 TABLET | Refills: 11 | Status: SHIPPED | OUTPATIENT
Start: 2018-10-08 | End: 2019-03-25

## 2018-11-29 DIAGNOSIS — Z94.0 KIDNEY REPLACED BY TRANSPLANT: ICD-10-CM

## 2018-11-29 RX ORDER — TACROLIMUS 1 MG/1
CAPSULE ORAL
Qty: 270 CAPSULE | Refills: 9 | Status: CANCELLED | OUTPATIENT
Start: 2018-11-29

## 2018-11-30 DIAGNOSIS — Z94.0 KIDNEY REPLACED BY TRANSPLANT: ICD-10-CM

## 2018-11-30 RX ORDER — TACROLIMUS 1 MG/1
CAPSULE ORAL
Qty: 270 CAPSULE | Refills: 9 | Status: SHIPPED | OUTPATIENT
Start: 2018-11-30 | End: 2019-03-28

## 2018-12-03 ENCOUNTER — HOSPITAL ENCOUNTER (EMERGENCY)
Facility: HOSPITAL | Age: 63
Discharge: HOME | End: 2018-12-04
Attending: EMERGENCY MEDICINE
Payer: MEDICARE

## 2018-12-03 ENCOUNTER — OFFICE VISIT (OUTPATIENT)
Dept: INTERNAL MEDICINE | Facility: CLINIC | Age: 63
End: 2018-12-03
Payer: MEDICARE

## 2018-12-03 ENCOUNTER — HOSPITAL ENCOUNTER (OUTPATIENT)
Dept: RADIOLOGY | Facility: HOSPITAL | Age: 63
Discharge: HOME | End: 2018-12-03
Attending: NURSE PRACTITIONER
Payer: MEDICARE

## 2018-12-03 ENCOUNTER — TELEPHONE (OUTPATIENT)
Dept: INTERNAL MEDICINE | Facility: CLINIC | Age: 63
End: 2018-12-03

## 2018-12-03 VITALS
DIASTOLIC BLOOD PRESSURE: 86 MMHG | WEIGHT: 284.06 LBS | BODY MASS INDEX: 44.58 KG/M2 | HEIGHT: 67 IN | SYSTOLIC BLOOD PRESSURE: 125 MMHG | HEART RATE: 88 BPM | TEMPERATURE: 99 F | OXYGEN SATURATION: 99 %

## 2018-12-03 DIAGNOSIS — R05.9 COUGH: ICD-10-CM

## 2018-12-03 DIAGNOSIS — J18.9 PNEUMONIA OF RIGHT LOWER LOBE DUE TO INFECTIOUS ORGANISM: Primary | ICD-10-CM

## 2018-12-03 DIAGNOSIS — R14.0 ABDOMINAL BLOATING: ICD-10-CM

## 2018-12-03 DIAGNOSIS — R06.02 SHORTNESS OF BREATH: ICD-10-CM

## 2018-12-03 DIAGNOSIS — R06.02 SHORTNESS OF BREATH: Primary | ICD-10-CM

## 2018-12-03 DIAGNOSIS — Z94.0 KIDNEY TRANSPLANT STATUS: ICD-10-CM

## 2018-12-03 PROCEDURE — 74019 RADEX ABDOMEN 2 VIEWS: CPT | Mod: TC,FY,PO

## 2018-12-03 PROCEDURE — 71046 X-RAY EXAM CHEST 2 VIEWS: CPT | Mod: 26,,, | Performed by: RADIOLOGY

## 2018-12-03 PROCEDURE — 71046 X-RAY EXAM CHEST 2 VIEWS: CPT | Mod: TC,FY,PO

## 2018-12-03 PROCEDURE — 93005 ELECTROCARDIOGRAM TRACING: CPT

## 2018-12-03 PROCEDURE — 99285 EMERGENCY DEPT VISIT HI MDM: CPT | Mod: 25

## 2018-12-03 PROCEDURE — 99214 OFFICE O/P EST MOD 30 MIN: CPT | Mod: S$PBB,,, | Performed by: NURSE PRACTITIONER

## 2018-12-03 PROCEDURE — 74019 RADEX ABDOMEN 2 VIEWS: CPT | Mod: 26,,, | Performed by: RADIOLOGY

## 2018-12-03 PROCEDURE — 99213 OFFICE O/P EST LOW 20 MIN: CPT | Mod: PBBFAC,25,27,PO | Performed by: NURSE PRACTITIONER

## 2018-12-03 PROCEDURE — 96365 THER/PROPH/DIAG IV INF INIT: CPT

## 2018-12-03 PROCEDURE — 99999 PR PBB SHADOW E&M-EST. PATIENT-LVL III: CPT | Mod: PBBFAC,,, | Performed by: NURSE PRACTITIONER

## 2018-12-03 RX ORDER — LEVOFLOXACIN 500 MG/1
500 TABLET, FILM COATED ORAL DAILY
Qty: 7 TABLET | Refills: 0 | Status: SHIPPED | OUTPATIENT
Start: 2018-12-03 | End: 2018-12-17

## 2018-12-03 RX ORDER — FLUTICASONE PROPIONATE 50 MCG
2 SPRAY, SUSPENSION (ML) NASAL DAILY
Qty: 16 G | Refills: 0 | Status: SHIPPED | OUTPATIENT
Start: 2018-12-03 | End: 2020-01-02

## 2018-12-03 NOTE — PROGRESS NOTES
Subjective:       Patient ID: Mitch Whittaker is a 63 y.o. male.    Chief Complaint: Abdominal Pain and Nasal Congestion (1 month worsen)    Patient presents with nasal congestion, shortness of breath, and abdominal discomfort.  Reports some belching and bloating.   Taking Pepcid 20 mg daily.  Reports last bowel movement about 2 days ago.  Taking Miralax as needed.  Last dose was around Thanksgiving.  No nausea or vomiting.  Has history of constipation.        Review of Systems   Constitutional: Negative for chills and fatigue.   HENT: Positive for congestion and rhinorrhea.    Respiratory: Positive for cough and shortness of breath.    Gastrointestinal: Positive for abdominal distention, abdominal pain and constipation.   Musculoskeletal: Negative for arthralgias and gait problem.   Psychiatric/Behavioral: Negative for agitation and confusion.       Objective:      Physical Exam   Constitutional: He is oriented to person, place, and time. Vital signs are normal. He appears well-developed and well-nourished.   HENT:   Head: Normocephalic and atraumatic.   Right Ear: Hearing, tympanic membrane, external ear and ear canal normal.   Left Ear: Hearing, tympanic membrane, external ear and ear canal normal.   Nose: Mucosal edema present.   Neck: Normal range of motion.   Cardiovascular: Normal rate and regular rhythm.   Pulmonary/Chest: Effort normal. He has decreased breath sounds.   Musculoskeletal: Normal range of motion.   Neurological: He is alert and oriented to person, place, and time.   Skin: Skin is warm.   Psychiatric: He has a normal mood and affect. His behavior is normal.       Assessment:       1. Shortness of breath    2. Abdominal bloating    3. Kidney transplant status        Plan:         Shortness of breath  -     Brain natriuretic peptide; Future; Expected date: 12/03/2018  -     Comprehensive metabolic panel; Future; Expected date: 12/03/2018  -     CBC auto differential; Future; Expected date:  12/03/2018  -     Urinalysis; Future; Expected date: 12/03/2018  -     X-Ray Chest PA And Lateral; Future; Expected date: 12/03/2018    Abdominal bloating  -     Comprehensive metabolic panel; Future; Expected date: 12/03/2018  -     CBC auto differential; Future; Expected date: 12/03/2018  -     X-Ray Abdomen Flat And Erect; Future; Expected date: 12/03/2018  -     Urinalysis; Future; Expected date: 12/03/2018    Kidney transplant status  -     Comprehensive metabolic panel; Future; Expected date: 12/03/2018    Other orders  -     fluticasone (FLONASE) 50 mcg/actuation nasal spray; use 2 sprays (100 mcg total) in each nostril once daily  Dispense: 16 g; Refill: 0        Labs and imaging today.  Will inform of reports.  Schedule 2 week follow up with PCP.

## 2018-12-03 NOTE — TELEPHONE ENCOUNTER
----- Message from Gabrielle Gray sent at 12/3/2018  4:00 PM CST -----  Contact: Bhakti castillo  Calling concerning if a possible interaction with patient medication. Please call daughter @ [phone]. Thanks, baldomero

## 2018-12-04 VITALS
BODY MASS INDEX: 44.69 KG/M2 | OXYGEN SATURATION: 98 % | TEMPERATURE: 98 F | WEIGHT: 284.75 LBS | DIASTOLIC BLOOD PRESSURE: 103 MMHG | RESPIRATION RATE: 22 BRPM | HEIGHT: 67 IN | HEART RATE: 101 BPM | SYSTOLIC BLOOD PRESSURE: 168 MMHG

## 2018-12-04 LAB
ALBUMIN SERPL BCP-MCNC: 3.6 G/DL
ALP SERPL-CCNC: 68 U/L
ALT SERPL W/O P-5'-P-CCNC: 20 U/L
ANION GAP SERPL CALC-SCNC: 12 MMOL/L
AST SERPL-CCNC: 16 U/L
BASOPHILS # BLD AUTO: 0.01 K/UL
BASOPHILS NFR BLD: 0.1 %
BILIRUB SERPL-MCNC: 0.6 MG/DL
BNP SERPL-MCNC: 134 PG/ML
BUN SERPL-MCNC: 18 MG/DL
CALCIUM SERPL-MCNC: 9.2 MG/DL
CHLORIDE SERPL-SCNC: 103 MMOL/L
CK MB SERPL-MCNC: 4 NG/ML
CK MB SERPL-RTO: 1.5 %
CK SERPL-CCNC: 270 U/L
CK SERPL-CCNC: 270 U/L
CO2 SERPL-SCNC: 24 MMOL/L
CREAT SERPL-MCNC: 1.4 MG/DL
D DIMER PPP IA.FEU-MCNC: 0.37 MG/L FEU
DIFFERENTIAL METHOD: ABNORMAL
EOSINOPHIL # BLD AUTO: 0.1 K/UL
EOSINOPHIL NFR BLD: 1.2 %
ERYTHROCYTE [DISTWIDTH] IN BLOOD BY AUTOMATED COUNT: 14.1 %
EST. GFR  (AFRICAN AMERICAN): >60 ML/MIN/1.73 M^2
EST. GFR  (NON AFRICAN AMERICAN): 52 ML/MIN/1.73 M^2
GLUCOSE SERPL-MCNC: 178 MG/DL
HCT VFR BLD AUTO: 44.6 %
HGB BLD-MCNC: 13.9 G/DL
INFLUENZA A, MOLECULAR: NEGATIVE
INFLUENZA B, MOLECULAR: NEGATIVE
LACTATE SERPL-SCNC: 1.2 MMOL/L
LYMPHOCYTES # BLD AUTO: 0.8 K/UL
LYMPHOCYTES NFR BLD: 9.4 %
MCH RBC QN AUTO: 25.9 PG
MCHC RBC AUTO-ENTMCNC: 31.2 G/DL
MCV RBC AUTO: 83 FL
MONOCYTES # BLD AUTO: 0.6 K/UL
MONOCYTES NFR BLD: 7.2 %
NEUTROPHILS # BLD AUTO: 6.9 K/UL
NEUTROPHILS NFR BLD: 82.1 %
PLATELET # BLD AUTO: 180 K/UL
PMV BLD AUTO: 9.9 FL
POTASSIUM SERPL-SCNC: 4.1 MMOL/L
PROT SERPL-MCNC: 6.8 G/DL
RBC # BLD AUTO: 5.36 M/UL
SODIUM SERPL-SCNC: 139 MMOL/L
SPECIMEN SOURCE: NORMAL
TROPONIN I SERPL DL<=0.01 NG/ML-MCNC: 0.02 NG/ML
TROPONIN I SERPL DL<=0.01 NG/ML-MCNC: 0.03 NG/ML
WBC # BLD AUTO: 8.37 K/UL

## 2018-12-04 PROCEDURE — 83605 ASSAY OF LACTIC ACID: CPT

## 2018-12-04 PROCEDURE — 82550 ASSAY OF CK (CPK): CPT

## 2018-12-04 PROCEDURE — 85025 COMPLETE CBC W/AUTO DIFF WBC: CPT

## 2018-12-04 PROCEDURE — 87502 INFLUENZA DNA AMP PROBE: CPT

## 2018-12-04 PROCEDURE — 36415 COLL VENOUS BLD VENIPUNCTURE: CPT

## 2018-12-04 PROCEDURE — 93010 ELECTROCARDIOGRAM REPORT: CPT | Mod: ,,, | Performed by: INTERNAL MEDICINE

## 2018-12-04 PROCEDURE — 87040 BLOOD CULTURE FOR BACTERIA: CPT

## 2018-12-04 PROCEDURE — 63600175 PHARM REV CODE 636 W HCPCS: Performed by: EMERGENCY MEDICINE

## 2018-12-04 PROCEDURE — 84484 ASSAY OF TROPONIN QUANT: CPT | Mod: 91

## 2018-12-04 PROCEDURE — 82553 CREATINE MB FRACTION: CPT

## 2018-12-04 PROCEDURE — 80053 COMPREHEN METABOLIC PANEL: CPT

## 2018-12-04 PROCEDURE — 85379 FIBRIN DEGRADATION QUANT: CPT

## 2018-12-04 PROCEDURE — 83880 ASSAY OF NATRIURETIC PEPTIDE: CPT

## 2018-12-04 RX ADMIN — CEFTRIAXONE 1 G: 1 INJECTION, SOLUTION INTRAVENOUS at 12:12

## 2018-12-04 NOTE — ED PROVIDER NOTES
SCRIBE #1 NOTE: I, Leyda Tequila, am scribing for, and in the presence of, Geo Walker MD. I have scribed the entire note.         History     Chief Complaint   Patient presents with    Shortness of Breath     pt reports he was diagnosed with pneumonia earlier today but is having pain in chest from coughing and shortness of breath       Review of patient's allergies indicates:   Allergen Reactions    Lisinopril Other (See Comments)     Other reaction(s):  cough    Actos  [pioglitazone]      Other reaction(s): chf    Metformin      Other reaction(s): renal insuff  Other reaction(s): chf         History of Present Illness   HPI    12/4/2018, 12:00 AM  History obtained from the patient      History of Present Illness: Mitch Whittaker is a 63 y.o. male patient with PMHx of CAD, CHF, CKD, DM, ESRD, HTN, and liver transplant who presents to the Emergency Department for SOB which onset gradually a few days ago. Patient reports being evaluated at Urgent Care yesterday for sxs and was diagnosed with possible pneumonia started on Levaquin; has taken 1 dose. Patient states he continues to c/o malaise. Symptoms are constant and moderate in severity. No mitigating or exacerbating factors reported. Associated sxs include nonproductive cough and CP. Patient denies any fever, chills, sore throat, rhinorrhea, leg swelling, abd pain, N/V/D, dizziness, extremity weakness/numbness, and all other sxs at this time. No further complaints or concerns at this time.     Arrival mode: Personal vehicle     PCP: Cornelio Ham MD        Past Medical History:  Past Medical History:   Diagnosis Date    Acquired renal cyst of left kidney     Anemia associated with chronic renal failure     CAD (coronary artery disease)     nonobstructive [facility] 9/14    CHF (congestive heart failure)     Chronic immunosuppression with Prograf and MMF 6/18/2015    CKD (chronic kidney disease) stage 3, GFR 30-59 ml/min     Diabetic retinopathy      DM (diabetes mellitus), type 2 with complications 1994    Edema     End stage kidney disease     s/p transplant, doing well    Gallbladder polyp     Heart failure, diastolic, due to HTN     Hemodialysis status     off since transplant    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     History of colon polyps     HPTH (hyperparathyroidism)     Hyperlipidemia     Hypertension associated with stage 3 chronic kidney disease due to type 2 diabetes mellitus     Obesity     Proteinuria     resolved s/p transplant    S/P kidney transplant     Type 2 diabetes, uncontrolled, with retinopathy     Type II diabetes mellitus with renal manifestations        Past Surgical History:  Past Surgical History:   Procedure Laterality Date    CARDIAC CATHETERIZATION  2008    normal coronary    COLONOSCOPY N/A 4/5/2018    Procedure: COLONOSCOPY;  Surgeon: Chava Ronquillo MD;  Location: [facility];  Service: Endoscopy;  Laterality: N/A;    COLONOSCOPY N/A 4/5/2018    Performed by [facility]illo MD at [facility] ENDO    Colonoscopy N/A 11/12/2014    Performed by José Luis Kevin MD at [facility] ENDO    HEART CATH-LEFT Left 9/29/2014    Performed by Migel Morris MD at [facility] CATH LAB    INSERTION, CATHETER, VASCULAR Right 7/9/2013    Performed by Javy Vang MD at [facility] CATH LAB    INSERTION, GRAFT, ARTERIOVENOUS Right 7/29/2013    Performed by Grover Cesar MD at [facility] OR    KIDNEY TRANSPLANT      RETINAL LASER PROCEDURE      TRANSPLANT-KIDNEY N/A 6/12/2015    Performed by Carlota Wilson MD at [facility] OR [address] [place] [address]         Family History:  Family History   Problem Relation Age of Onset    Diabetes Mother     Hypertension Mother     Heart failure Mother     Kidney disease Sister         ESRD    Diabetes Sister     Diabetes Maternal Grandmother     Cancer Neg Hx        Social History:  Social History     Tobacco Use    Smoking status: Never Smoker    Smokeless tobacco: Former User     Tobacco comment: used marijuana since 0515-2869, stopped after started dialysis   Substance and Sexual Activity    Alcohol use: No     Alcohol/week: 0.0 oz    Drug use: No     Comment:      Sexual activity: No        Review of Systems   Review of Systems   Constitutional: Negative for chills and fever.        (+) malaise   HENT: Negative for rhinorrhea and sore throat.    Respiratory: Positive for cough and shortness of breath.    Cardiovascular: Positive for chest pain. Negative for leg swelling.   Gastrointestinal: Negative for abdominal pain, diarrhea, nausea and vomiting.   Genitourinary: Negative for dysuria.   Musculoskeletal: Negative for back pain.   Skin: Negative for rash.   Neurological: Negative for dizziness, weakness and numbness.   Hematological: Does not bruise/bleed easily.   All other systems reviewed and are negative.       Physical Exam     Initial Vitals [12/03/18 2134]   BP Pulse Resp Temp SpO2   (!) 181/96 (!) 113 20 98.3 °F (36.8 °C) (!) 94 %      MAP       --          Physical Exam  Nursing Notes and Vital Signs Reviewed.  Constitutional: Patient is in no acute distress. Well-developed and well-nourished.  Head: Atraumatic. Normocephalic.  Eyes: PERRL. EOM intact. Conjunctivae are not pale. No scleral icterus.  ENT: Mucous membranes are moist. Oropharynx is clear and symmetric.    Neck: Supple. Full ROM. No lymphadenopathy.  Cardiovascular: Regular rate. Regular rhythm. No murmurs, rubs, or gallops. Distal pulses are 2+ and symmetric.  Pulmonary/Chest: No respiratory distress. Clear to auscultation bilaterally. No wheezing or rales.  Abdominal: Soft and non-distended.  There is no tenderness.  No rebound, guarding, or rigidity.   Musculoskeletal: Moves all extremities. No obvious deformities. No edema. No calf tenderness.  Skin: Warm and dry.  Neurological:  Alert, awake, and appropriate.  Normal speech.  No acute focal neurological deficits are appreciated.  Psychiatric: Normal affect.  "Good eye contact. Appropriate in content.     ED Course   Procedures  ED Vital Signs:  Vitals:    12/03/18 2134 12/03/18 2353 12/03/18 2354 12/04/18 0132   BP: (!) 181/96  (!) 145/82 (!) 157/86   Pulse: (!) 113 103 107 94   Resp: 20  (!) 22 (!) 22   Temp: 98.3 °F (36.8 °C)   98 °F (36.7 °C)   TempSrc: Oral      SpO2: (!) 94%  97% 97%   Weight: 129.2 kg (284 lb 11.6 oz)      Height: 5' 7" (1.702 m)       12/04/18 0201 12/04/18 0326   BP: (!) 167/99 (!) 154/84   Pulse: 99 100   Resp: (!) 22 20   Temp:  98 °F (36.7 °C)   TempSrc:  Oral   SpO2: 97% 97%   Weight:     Height:         Abnormal Lab Results:  Labs Reviewed   CBC W/ AUTO DIFFERENTIAL - Abnormal; Notable for the following components:       Result Value    Hemoglobin 13.9 (*)     MCH 25.9 (*)     MCHC 31.2 (*)     Lymph # 0.8 (*)     Gran% 82.1 (*)     Lymph% 9.4 (*)     All other components within normal limits   B-TYPE NATRIURETIC PEPTIDE - Abnormal; Notable for the following components:     (*)     All other components within normal limits   CK-MB - Abnormal; Notable for the following components:     (*)     All other components within normal limits   COMPREHENSIVE METABOLIC PANEL - Abnormal; Notable for the following components:    Glucose 178 (*)     eGFR if non  52 (*)     All other components within normal limits   CK - Abnormal; Notable for the following components:     (*)     All other components within normal limits   TROPONIN I - Abnormal; Notable for the following components:    Troponin I 0.031 (*)     All other components within normal limits   INFLUENZA A & B BY MOLECULAR   CULTURE, BLOOD   CULTURE, BLOOD   LACTIC ACID, PLASMA   D DIMER, QUANTITATIVE   TROPONIN I        All Lab Results:  Results for orders placed or performed during the hospital encounter of 12/03/18   Influenza A & B by Molecular   Result Value Ref Range    Influenza A, Molecular Negative Negative    Influenza B, Molecular Negative Negative "    Flu A & B Source Nasal swab    CBC auto differential   Result Value Ref Range    WBC 8.37 3.90 - 12.70 K/uL    RBC 5.36 4.60 - 6.20 M/uL    Hemoglobin 13.9 (L) 14.0 - 18.0 g/dL    Hematocrit 44.6 40.0 - 54.0 %    MCV 83 82 - 98 fL    MCH 25.9 (L) 27.0 - 31.0 pg    MCHC 31.2 (L) 32.0 - 36.0 g/dL    RDW 14.1 11.5 - 14.5 %    Platelets 180 150 - 350 K/uL    MPV 9.9 9.2 - 12.9 fL    Gran # (ANC) 6.9 1.8 - 7.7 K/uL    Lymph # 0.8 (L) 1.0 - 4.8 K/uL    Mono # 0.6 0.3 - 1.0 K/uL    Eos # 0.1 0.0 - 0.5 K/uL    Baso # 0.01 0.00 - 0.20 K/uL    Gran% 82.1 (H) 38.0 - 73.0 %    Lymph% 9.4 (L) 18.0 - 48.0 %    Mono% 7.2 4.0 - 15.0 %    Eosinophil% 1.2 0.0 - 8.0 %    Basophil% 0.1 0.0 - 1.9 %    Differential Method Automated    Brain natriuretic peptide   Result Value Ref Range     (H) 0 - 99 pg/mL   CK-MB   Result Value Ref Range     (H) 20 - 200 U/L    CPK MB 4.0 0.1 - 6.5 ng/mL    MB% 1.5 0.0 - 5.0 %   Comprehensive metabolic panel   Result Value Ref Range    Sodium 139 136 - 145 mmol/L    Potassium 4.1 3.5 - 5.1 mmol/L    Chloride 103 95 - 110 mmol/L    CO2 24 23 - 29 mmol/L    Glucose 178 (H) 70 - 110 mg/dL    BUN, Bld 18 8 - 23 mg/dL    Creatinine 1.4 0.5 - 1.4 mg/dL    Calcium 9.2 8.7 - 10.5 mg/dL    Total Protein 6.8 6.0 - 8.4 g/dL    Albumin 3.6 3.5 - 5.2 g/dL    Total Bilirubin 0.6 0.1 - 1.0 mg/dL    Alkaline Phosphatase 68 55 - 135 U/L    AST 16 10 - 40 U/L    ALT 20 10 - 44 U/L    Anion Gap 12 8 - 16 mmol/L    eGFR if African American >60 >60 mL/min/1.73 m^2    eGFR if non African American 52 (A) >60 mL/min/1.73 m^2   CK   Result Value Ref Range     (H) 20 - 200 U/L   Troponin I   Result Value Ref Range    Troponin I 0.031 (H) 0.000 - 0.026 ng/mL   Lactic acid, plasma   Result Value Ref Range    Lactate (Lactic Acid) 1.2 0.5 - 2.2 mmol/L   D dimer, quantitative   Result Value Ref Range    D-Dimer 0.37 <0.50 mg/L FEU   Troponin I   Result Value Ref Range    Troponin I 0.024 0.000 - 0.026 ng/mL        Imaging Results:  Imaging Results          X-Ray Chest PA And Lateral (Preliminary result)  Result time 12/04/18 01:57:20    ED Interpretation by Geo Walker MD (12/04/18 01:57:20, [facility] - , Emergency Medicine)    RLL inf vs atelectasis                               The EKG was ordered, reviewed, and independently interpreted by the ED provider.  Interpretation time: 0027  Rate: 102 BPM  Rhythm: sinus tachycardia  Interpretation: Left axis deviation. LBBB. No STEMI.          The Emergency Provider reviewed the vital signs and test results, which are outlined above.     ED Discussion     4:52 AM: Reassessed pt at this time. Advised patient to continue his prescribed Levaquin. Discussed with pt all pertinent ED information and results. Discussed pt dx and plan of tx. Gave pt all f/u and return to the ED instructions. All questions and concerns were addressed at this time. Pt expresses understanding of information and instructions, and is comfortable with plan to discharge. Pt is stable for discharge.    I discussed with patient and/or family/caretaker that evaluation in the ED does not suggest any emergent or life threatening medical conditions requiring immediate intervention beyond what was provided in the ED, and I believe patient is safe for discharge.  Regardless, an unremarkable evaluation in the ED does not preclude the development or presence of a serious of life threatening condition. As such, patient was instructed to return immediately for any worsening or change in current symptoms.          ED Medication(s):  Medications   cefTRIAXone (ROCEPHIN) 1 g in dextrose 5 % 50 mL IVPB (0 g Intravenous Stopped 12/4/18 0129)     Current Discharge Medication List   None       Follow-up Information     Cornelio Ham MD. Call in 1 day.    Specialty:  Internal Medicine  Contact information:  [address]   [address]  [facility] [id number] [phone]             [facility]  [place] [facility] [place]    Specialty:  Emergency Medicine  Why:  If symptoms worsen  Contact information:  [address] [facility] [address] [phone]                      Medical Decision Making     Medical Decision Making:   Clinical Tests:   Lab Tests: Ordered and Reviewed  Radiological Study: Ordered and Reviewed  Medical Tests: Ordered and Reviewed             Scribe Attestation:   Scribe #1: I performed the above scribed service and the documentation accurately describes the services I performed. I attest to the accuracy of the note.     Attending:   Physician Attestation Statement for Scribe #1: I, Geo Walker MD, personally performed the services described in this documentation, as scribed by Leyda Mandel, in my presence, and it is both accurate and complete.           Clinical Impression       ICD-10-CM ICD-9-CM   1. Pneumonia of right lower lobe due to infectious organism J18.1 486   2. Cough R05 786.2       Disposition:   Disposition: Discharged  Condition: Stable         Geo Walker MD  12/04/18 0501

## 2018-12-09 LAB
BACTERIA BLD CULT: NORMAL
BACTERIA BLD CULT: NORMAL

## 2018-12-10 DIAGNOSIS — E83.42 HYPOMAGNESEMIA: Primary | ICD-10-CM

## 2018-12-10 RX ORDER — LANOLIN ALCOHOL/MO/W.PET/CERES
800 CREAM (GRAM) TOPICAL 2 TIMES DAILY
Qty: 120 TABLET | Refills: 11 | Status: CANCELLED | OUTPATIENT
Start: 2018-12-10 | End: 2019-12-10

## 2018-12-10 RX ORDER — LANOLIN ALCOHOL/MO/W.PET/CERES
400 CREAM (GRAM) TOPICAL 2 TIMES DAILY
Qty: 60 TABLET | Refills: 11 | Status: SHIPPED | OUTPATIENT
Start: 2018-12-10 | End: 2019-03-25

## 2018-12-10 RX ORDER — LANOLIN ALCOHOL/MO/W.PET/CERES
CREAM (GRAM) TOPICAL 2 TIMES DAILY
Qty: 120 TABLET | Refills: 11 | OUTPATIENT
Start: 2018-12-10 | End: 2019-12-10

## 2018-12-10 RX ORDER — LANOLIN ALCOHOL/MO/W.PET/CERES
800 CREAM (GRAM) TOPICAL 2 TIMES DAILY
Qty: 120 TABLET | Refills: 11 | OUTPATIENT
Start: 2018-12-10 | End: 2019-12-10

## 2018-12-17 ENCOUNTER — OFFICE VISIT (OUTPATIENT)
Dept: NEPHROLOGY | Facility: CLINIC | Age: 63
End: 2018-12-17
Payer: MEDICARE

## 2018-12-17 VITALS
DIASTOLIC BLOOD PRESSURE: 70 MMHG | BODY MASS INDEX: 45.15 KG/M2 | WEIGHT: 287.69 LBS | SYSTOLIC BLOOD PRESSURE: 130 MMHG | HEART RATE: 90 BPM | HEIGHT: 67 IN

## 2018-12-17 DIAGNOSIS — Z94.0 S/P KIDNEY TRANSPLANT: Primary | ICD-10-CM

## 2018-12-17 DIAGNOSIS — R60.0 LOCALIZED EDEMA: ICD-10-CM

## 2018-12-17 PROCEDURE — 99213 OFFICE O/P EST LOW 20 MIN: CPT | Mod: PBBFAC | Performed by: INTERNAL MEDICINE

## 2018-12-17 PROCEDURE — 99215 OFFICE O/P EST HI 40 MIN: CPT | Mod: S$PBB,,, | Performed by: INTERNAL MEDICINE

## 2018-12-17 PROCEDURE — 99999 PR PBB SHADOW E&M-EST. PATIENT-LVL III: CPT | Mod: PBBFAC,,, | Performed by: INTERNAL MEDICINE

## 2018-12-17 RX ORDER — FUROSEMIDE 40 MG/1
40 TABLET ORAL DAILY PRN
Qty: 30 TABLET | Refills: 11 | Status: SHIPPED | OUTPATIENT
Start: 2018-12-17 | End: 2019-03-25

## 2018-12-17 NOTE — LETTER
December 17, 2018      Cornelio Ham MD  [address]  [address]  [facility] [address]           [facility]  [address] [place] [address]  Phone: [phone]  Fax: [phone]          Patient: Mitch Whittaker   MR Number: [id number]   YOB: 1955   Date of Visit: 12/17/2018       Dear Dr. Cornelio Ham:    Thank you for referring Mitch Whittaker to me for evaluation. Attached you will find relevant portions of my assessment and plan of care.    If you have questions, please do not hesitate to call me. I look forward to following Mitch Whittaker along with you.    Sincerely,    Berlin Pacheco MD    Enclosure  CC:  No Recipients    If you would like to receive this communication electronically, please contact [email or web address] or [phone] to request more information on [employer] access.    For providers and/or their staff who would like to refer a patient to [facility], please contact us through our one-stop-shop provider referral line, [facility], at [phone].    If you feel you have received this communication in error or would no longer like to receive these types of communications, please e-mail [email or web address]

## 2018-12-17 NOTE — PATIENT INSTRUCTIONS
Avoid NSAID pain medications such as advil, aleve, motrin, ibuprofen, naprosyn, meloxicam, diclofenac, mobic.     Low salt diet     Fluid restriction about 60 oz a day     Start Lasix 40 mg daily

## 2018-12-17 NOTE — PROGRESS NOTES
Subjective:       Patient ID: Mitch Whittaker is a 63 y.o.   male who presents for follow-up evaluation of s/o LRRT ( 6/2015- Dtr ) , HTN, CKD stage 3     Cornelio Ham MD      HPI: Mitch Whittaker is a pleasant 63-year-old  gentleman seen in office today in follow-up for above medical problems.  He is status post living related renal transplant in June 2015 donated by his daughter.  Prior to that he was on hemodialysis for about 2 and half years at [facility] under care of Dr Danielle.  Patient had a right upper extremity AV fistula that was later removed after his transplant surgery.  Recent labs reviewed and discussed with the patient.  Renal function is stable with a serum creatinine of 1.4 mg/dL.  It currently on Prograf, CellCept and prednisone for immunosuppression.  Recent emergency room notes reviewed, he was diagnosed with PNA and treated with Levaquin.  Significant peripheral edema noted today on exam.            Past Medical History:   Diagnosis Date    Acquired renal cyst of left kidney     Anemia associated with chronic renal failure     CAD (coronary artery disease)     nonobstructive [facility] 9/14    CHF (congestive heart failure)     Chronic immunosuppression with Prograf and MMF 6/18/2015    CKD (chronic kidney disease) stage 3, GFR 30-59 ml/min     Diabetic retinopathy     DM (diabetes mellitus), type 2 with complications 1994    Edema     End stage kidney disease     s/p transplant, doing well    Gallbladder polyp     Heart failure, diastolic, due to HTN     Hemodialysis status     off since transplant    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     History of colon polyps     HPTH (hyperparathyroidism)     Hyperlipidemia     Hypertension associated with stage 3 chronic kidney disease due to type 2 diabetes mellitus     Obesity     Proteinuria     resolved s/p transplant    S/P kidney transplant   "   Type 2 diabetes, uncontrolled, with retinopathy     Type II diabetes mellitus with renal manifestations        Current Outpatient Medications on File Prior to Visit   Medication Sig Dispense Refill    aspirin (ECOTRIN) 81 MG EC tablet Take 1 tablet by mouth Daily.       BD ULTRA-FINE MINI PEN NEEDLE 31 gauge x 3/16" Ndle Use to inject insulin as needed up to 4 times daily 100 each 1    bisacodyl (DULCOLAX) 5 mg EC tablet Take 5 mg by mouth daily as needed for Constipation.      blood sugar diagnostic Strp Use to test four times per day 150 strip 11    ergocalciferol (VITAMIN D2) 50,000 unit Cap Take 1 capsule by mouth every 7 days 12 capsule 3    famotidine (PEPCID) 20 MG tablet TAKE ONE TABLET BY MOUTH EVERY EVENING 30 tablet 11    finasteride (PROSCAR) 5 mg tablet Take 1 tablet (5 mg total) by mouth once daily. 30 tablet 11    fluticasone (FLONASE) 50 mcg/actuation nasal spray use 2 sprays (100 mcg total) in each nostril once daily 16 g 0    glimepiride (AMARYL) 4 MG tablet Take 1 tablet (4 mg total) by mouth before breakfast. 90 tablet 3    insulin aspart U-100 (NOVOLOG FLEXPEN U-100 INSULIN) 100 unit/mL InPn pen Inject 25 Units into the skin 3 (three) times daily with meals. 30 mL 11    insulin glargine (LANTUS SOLOSTAR) 100 unit/mL (3 mL) InPn pen Inject 40 Units into the skin 2 (two) times daily. 30 mL 11    ketoconazole (NIZORAL) 200 mg Tab TAKE HALF TABLET (100 MG TOTAL) BY MOUTH ONCE DAILY 15 tablet 10    lancets 33 gauge Misc 1 Stick by Misc.(Non-Drug; Combo Route) route as directed. Contour lancets. Use to check BG 2-3 times daily. 150 each 11    magnesium oxide (MAG-OX) 400 mg (241.3 mg magnesium) tablet Take 1 tablet (400 mg total) by mouth 2 (two) times daily. 60 tablet 11    metFORMIN (GLUCOPHAGE) 500 MG tablet Take 1 tablet (500 mg total) by mouth 2 (two) times daily with meals. 180 tablet 3    metoprolol succinate (TOPROL-XL) 25 MG 24 hr tablet TAKE TWO TABLETS BY MOUTH ONCE " "DAILY 60 tablet 11    multivitamin (THERAGRAN) tablet Take 1 tablet by mouth once daily.      mycophenolate (CELLCEPT) 250 mg Cap TAKE THREE CAPSULES BY MOUTH TWICE A  capsule 11    omega-3 fatty acids-vitamin E (FISH OIL) 1,000 mg Cap Take 1 capsule by mouth once daily.       pen needle, diabetic (BD INSULIN PEN NEEDLE UF SHORT) 31 gauge x 5/16" Ndle USE TO INJECT INSULIN TWICE A  each 5    predniSONE (DELTASONE) 5 MG tablet Take 1 tablet (5 mg total) by mouth once daily. 30 tablet 11    rosuvastatin (CRESTOR) 40 MG Tab TAKE ONE TABLET BY MOUTH EVERY EVENING 30 tablet 11    tacrolimus (PROGRAF) 1 MG Cap TAKE 5 CAPSULES BY MOUTH EVERY MORNING AND 4 CAPSULES EVERY EVENING 270 capsule 9    tamsulosin (FLOMAX) 0.4 mg Cap Take 1 capsule (0.4 mg total) by mouth once daily. 30 capsule 11    amLODIPine (NORVASC) 2.5 MG tablet Take 1 tablet (2.5 mg total) by mouth once daily. 30 tablet 11    furosemide (LASIX) 40 MG tablet Take 1 tablet (40 mg total) by mouth daily as needed. 15 tablet 11    [DISCONTINUED] HUMALOG KWIKPEN 100 unit/mL InPn pen INJECT 15 UNITS SUBCUTANEOUSLY 10 TO 15 MINUTES BEFORE MEALS 15 Syringe 5    [DISCONTINUED] levoFLOXacin (LEVAQUIN) 500 MG tablet Take 1 tablet (500 mg total) by mouth once daily. 7 tablet 0     No current facility-administered medications on file prior to visit.    '  '        Review of Systems   Constitutional: Negative for activity change and appetite change.   HENT: Negative for congestion and facial swelling.    Eyes: Negative for pain, discharge and redness.   Respiratory: Positive for shortness of breath. Negative for apnea, cough and chest tightness.    Cardiovascular: Positive for leg swelling. Negative for chest pain and palpitations.   Gastrointestinal: Negative for abdominal distention.   Genitourinary: Negative for difficulty urinating, dysuria and frequency.   Musculoskeletal: Positive for arthralgias and back pain. Negative for neck pain and " neck stiffness.   Skin: Negative for color change, rash and wound.   Neurological: Negative for dizziness, weakness and numbness.   Psychiatric/Behavioral: Negative for sleep disturbance.   All other systems reviewed and are negative.    :            Objective:         Vitals:    12/17/18 1127   BP: 130/70   Pulse: 90       Weight 287 lbs       Physical Exam   Constitutional: He is oriented to person, place, and time. He appears well-developed and well-nourished. No distress.   HENT:   Head: Normocephalic and atraumatic.   Eyes: Pupils are equal, round, and reactive to light.   Neck: Normal range of motion. Neck supple. No tracheal deviation present. No thyromegaly present.   Cardiovascular: Normal rate, regular rhythm, normal heart sounds and intact distal pulses. Exam reveals no gallop and no friction rub.   No murmur heard.  Pulmonary/Chest: Effort normal. He has no wheezes. He has rales.   Abdominal: Soft. He exhibits no mass. There is no tenderness. There is no rebound and no guarding.   Obese abdomen ,     No allograft tenderness, RLQ   Musculoskeletal: Normal range of motion. He exhibits edema.   Lymphadenopathy:     He has no cervical adenopathy.   Neurological: He is alert and oriented to person, place, and time.   Skin: Skin is warm. No rash noted. He is not diaphoretic. No erythema.   Nursing note and vitals reviewed.          Labs:    Lab Results   Component Value Date    CREATININE 1.4 12/04/2018    BUN 18 12/04/2018     12/04/2018    K 4.1 12/04/2018     12/04/2018    CO2 24 12/04/2018       Lab Results   Component Value Date    WBC 8.37 12/04/2018    HGB 13.9 (L) 12/04/2018    HCT 44.6 12/04/2018    MCV 83 12/04/2018     12/04/2018       Lab Results   Component Value Date    .0 (H) 07/09/2015    CALCIUM 9.2 12/04/2018    PHOS 3.3 06/22/2018       Lab Results   Component Value Date    ALBUMIN 3.6 12/04/2018       Lab Results   Component Value Date    HGBA1C 8.5 (H) 09/07/2018        Impression and Plan :  63-year-old  gentleman seen in office today in follow-up for following medical problems    1. S/p LRRT ( 6/2016, Dtr ) , end-stage renal disease due to history of hypertension and diabetes, stable renal function with a serum creatinine of 1.4 mg/dL, currently on Prograf 5/4, CellCept 750 mg twice a day, prednisone 5 mg daily, ketoconazole 100 mg daily,    Will check Prograf level next week, target Prograf level 4-7,    2.  Hypertension - blood pressure controlled on current medications    3.  Volume - peripheral edema noted, has been drinking excessive fluids, discussed fluid restriction of about 60 oz a day, discussed low-salt diet, start furosemide 40 mg daily, check renal panel in a week,    4.  Anemia of chronic kidney disease - stable hemoglobin at 14 g    5.  Secondary hyperparathyroidism - check PTH and vitamin-D levels before next visit, on vitamin-D supplements,    6.  Type 2 diabetes - discussed adherence with diet and medications, discussed weight loss,    7.  Morbid obesity - discuss calorie restriction    8.  Hypomagnesemia - continue magnesium supplements, monitor magnesium levels,    9. BPH - on meds     Return to clinic in 8 weeks, more than 40 min of face-to-face time was spent with the patient discussing labs and plan of care, also time was spent counseling patient.    Berlin Pacheco MD

## 2018-12-28 ENCOUNTER — LAB VISIT (OUTPATIENT)
Dept: LAB | Facility: HOSPITAL | Age: 63
End: 2018-12-28
Attending: INTERNAL MEDICINE
Payer: COMMERCIAL

## 2018-12-28 DIAGNOSIS — E11.21 TYPE 2 DIABETES MELLITUS WITH DIABETIC NEPHROPATHY, WITH LONG-TERM CURRENT USE OF INSULIN: ICD-10-CM

## 2018-12-28 DIAGNOSIS — Z79.4 TYPE 2 DIABETES MELLITUS WITH DIABETIC NEPHROPATHY, WITH LONG-TERM CURRENT USE OF INSULIN: ICD-10-CM

## 2018-12-28 DIAGNOSIS — Z94.0 S/P KIDNEY TRANSPLANT: ICD-10-CM

## 2018-12-28 LAB
ALBUMIN SERPL BCP-MCNC: 3.4 G/DL
ALBUMIN SERPL BCP-MCNC: 3.4 G/DL
ALP SERPL-CCNC: 65 U/L
ALT SERPL W/O P-5'-P-CCNC: 18 U/L
ANION GAP SERPL CALC-SCNC: 8 MMOL/L
ANION GAP SERPL CALC-SCNC: 8 MMOL/L
AST SERPL-CCNC: 16 U/L
BASOPHILS # BLD AUTO: 0.03 K/UL
BASOPHILS NFR BLD: 0.4 %
BILIRUB SERPL-MCNC: 0.6 MG/DL
BUN SERPL-MCNC: 19 MG/DL
BUN SERPL-MCNC: 19 MG/DL
CALCIUM SERPL-MCNC: 9.4 MG/DL
CALCIUM SERPL-MCNC: 9.4 MG/DL
CHLORIDE SERPL-SCNC: 105 MMOL/L
CHLORIDE SERPL-SCNC: 105 MMOL/L
CHOLEST SERPL-MCNC: 142 MG/DL
CHOLEST/HDLC SERPL: 2.9 {RATIO}
CO2 SERPL-SCNC: 31 MMOL/L
CO2 SERPL-SCNC: 31 MMOL/L
CREAT SERPL-MCNC: 1.5 MG/DL
CREAT SERPL-MCNC: 1.5 MG/DL
DIFFERENTIAL METHOD: ABNORMAL
EOSINOPHIL # BLD AUTO: 0.1 K/UL
EOSINOPHIL NFR BLD: 0.8 %
ERYTHROCYTE [DISTWIDTH] IN BLOOD BY AUTOMATED COUNT: 13.4 %
EST. GFR  (AFRICAN AMERICAN): 55.7 ML/MIN/1.73 M^2
EST. GFR  (AFRICAN AMERICAN): 55.7 ML/MIN/1.73 M^2
EST. GFR  (NON AFRICAN AMERICAN): 48.2 ML/MIN/1.73 M^2
EST. GFR  (NON AFRICAN AMERICAN): 48.2 ML/MIN/1.73 M^2
ESTIMATED AVG GLUCOSE: 148 MG/DL
GLUCOSE SERPL-MCNC: 95 MG/DL
GLUCOSE SERPL-MCNC: 95 MG/DL
HBA1C MFR BLD HPLC: 6.8 %
HCT VFR BLD AUTO: 45.7 %
HDLC SERPL-MCNC: 49 MG/DL
HDLC SERPL: 34.5 %
HGB BLD-MCNC: 13.5 G/DL
IMM GRANULOCYTES # BLD AUTO: 0.06 K/UL
IMM GRANULOCYTES NFR BLD AUTO: 0.8 %
LDLC SERPL CALC-MCNC: 75.6 MG/DL
LYMPHOCYTES # BLD AUTO: 1.2 K/UL
LYMPHOCYTES NFR BLD: 15.7 %
MAGNESIUM SERPL-MCNC: 1.4 MG/DL
MCH RBC QN AUTO: 25.3 PG
MCHC RBC AUTO-ENTMCNC: 29.5 G/DL
MCV RBC AUTO: 86 FL
MONOCYTES # BLD AUTO: 0.7 K/UL
MONOCYTES NFR BLD: 9.9 %
NEUTROPHILS # BLD AUTO: 5.4 K/UL
NEUTROPHILS NFR BLD: 72.4 %
NONHDLC SERPL-MCNC: 93 MG/DL
NRBC BLD-RTO: 0 /100 WBC
PHOSPHATE SERPL-MCNC: 2.6 MG/DL
PLATELET # BLD AUTO: 210 K/UL
PMV BLD AUTO: 11.3 FL
POTASSIUM SERPL-SCNC: 4.1 MMOL/L
POTASSIUM SERPL-SCNC: 4.1 MMOL/L
PROT SERPL-MCNC: 6.7 G/DL
RBC # BLD AUTO: 5.33 M/UL
SODIUM SERPL-SCNC: 144 MMOL/L
SODIUM SERPL-SCNC: 144 MMOL/L
TRIGL SERPL-MCNC: 87 MG/DL
TSH SERPL DL<=0.005 MIU/L-ACNC: 0.77 UIU/ML
WBC # BLD AUTO: 7.46 K/UL

## 2018-12-28 PROCEDURE — 80069 RENAL FUNCTION PANEL: CPT

## 2018-12-28 PROCEDURE — 80197 ASSAY OF TACROLIMUS: CPT

## 2018-12-28 PROCEDURE — 36415 COLL VENOUS BLD VENIPUNCTURE: CPT | Mod: PO

## 2018-12-28 PROCEDURE — 80053 COMPREHEN METABOLIC PANEL: CPT

## 2018-12-28 PROCEDURE — 83036 HEMOGLOBIN GLYCOSYLATED A1C: CPT

## 2018-12-28 PROCEDURE — 80061 LIPID PANEL: CPT

## 2018-12-28 PROCEDURE — 84443 ASSAY THYROID STIM HORMONE: CPT

## 2018-12-28 PROCEDURE — 85025 COMPLETE CBC W/AUTO DIFF WBC: CPT

## 2018-12-28 PROCEDURE — 83735 ASSAY OF MAGNESIUM: CPT

## 2018-12-29 LAB — TACROLIMUS BLD-MCNC: 8.6 NG/ML

## 2019-01-04 ENCOUNTER — OFFICE VISIT (OUTPATIENT)
Dept: INTERNAL MEDICINE | Facility: CLINIC | Age: 64
End: 2019-01-04
Payer: MEDICARE

## 2019-01-04 VITALS
DIASTOLIC BLOOD PRESSURE: 82 MMHG | HEART RATE: 107 BPM | TEMPERATURE: 98 F | SYSTOLIC BLOOD PRESSURE: 136 MMHG | WEIGHT: 285.06 LBS | OXYGEN SATURATION: 96 % | HEIGHT: 69 IN | BODY MASS INDEX: 42.22 KG/M2

## 2019-01-04 DIAGNOSIS — E11.42 DIABETIC PERIPHERAL NEUROPATHY: ICD-10-CM

## 2019-01-04 DIAGNOSIS — E11.22 HYPERTENSION ASSOCIATED WITH STAGE 3 CHRONIC KIDNEY DISEASE DUE TO TYPE 2 DIABETES MELLITUS: ICD-10-CM

## 2019-01-04 DIAGNOSIS — D63.1 ANEMIA ASSOCIATED WITH CHRONIC RENAL FAILURE: ICD-10-CM

## 2019-01-04 DIAGNOSIS — N18.30 HYPERTENSION ASSOCIATED WITH STAGE 3 CHRONIC KIDNEY DISEASE DUE TO TYPE 2 DIABETES MELLITUS: ICD-10-CM

## 2019-01-04 DIAGNOSIS — E11.8 TYPE 2 DIABETES MELLITUS WITH COMPLICATION, WITH LONG-TERM CURRENT USE OF INSULIN: ICD-10-CM

## 2019-01-04 DIAGNOSIS — N18.9 ANEMIA ASSOCIATED WITH CHRONIC RENAL FAILURE: ICD-10-CM

## 2019-01-04 DIAGNOSIS — Z79.4 TYPE 2 DIABETES MELLITUS WITH STABLE PROLIFERATIVE RETINOPATHY OF BOTH EYES, WITH LONG-TERM CURRENT USE OF INSULIN: ICD-10-CM

## 2019-01-04 DIAGNOSIS — Z79.4 TYPE 2 DIABETES MELLITUS WITH COMPLICATION, WITH LONG-TERM CURRENT USE OF INSULIN: ICD-10-CM

## 2019-01-04 DIAGNOSIS — Z29.89 PROPHYLACTIC IMMUNOTHERAPY: Chronic | ICD-10-CM

## 2019-01-04 DIAGNOSIS — E11.3553 TYPE 2 DIABETES MELLITUS WITH STABLE PROLIFERATIVE RETINOPATHY OF BOTH EYES, WITH LONG-TERM CURRENT USE OF INSULIN: ICD-10-CM

## 2019-01-04 DIAGNOSIS — Z79.4 TYPE 2 DIABETES MELLITUS WITH DIABETIC NEPHROPATHY, WITH LONG-TERM CURRENT USE OF INSULIN: Primary | ICD-10-CM

## 2019-01-04 DIAGNOSIS — G47.33 OBSTRUCTIVE SLEEP APNEA SYNDROME: ICD-10-CM

## 2019-01-04 DIAGNOSIS — I12.9 HYPERTENSION ASSOCIATED WITH STAGE 3 CHRONIC KIDNEY DISEASE DUE TO TYPE 2 DIABETES MELLITUS: ICD-10-CM

## 2019-01-04 DIAGNOSIS — N28.1 ACQUIRED RENAL CYST OF LEFT KIDNEY: ICD-10-CM

## 2019-01-04 DIAGNOSIS — I25.10 CORONARY ARTERY DISEASE INVOLVING NATIVE CORONARY ARTERY OF NATIVE HEART WITHOUT ANGINA PECTORIS: ICD-10-CM

## 2019-01-04 DIAGNOSIS — N18.30 CKD (CHRONIC KIDNEY DISEASE) STAGE 3, GFR 30-59 ML/MIN: ICD-10-CM

## 2019-01-04 DIAGNOSIS — Z94.0 KIDNEY TRANSPLANT STATUS, LIVING RELATED DONOR: Chronic | ICD-10-CM

## 2019-01-04 DIAGNOSIS — E11.21 TYPE 2 DIABETES MELLITUS WITH DIABETIC NEPHROPATHY, WITH LONG-TERM CURRENT USE OF INSULIN: Primary | ICD-10-CM

## 2019-01-04 PROCEDURE — 99999 PR PBB SHADOW E&M-EST. PATIENT-LVL III: CPT | Mod: PBBFAC,,, | Performed by: INTERNAL MEDICINE

## 2019-01-04 PROCEDURE — 99214 OFFICE O/P EST MOD 30 MIN: CPT | Mod: S$PBB,,, | Performed by: INTERNAL MEDICINE

## 2019-01-04 PROCEDURE — 99213 OFFICE O/P EST LOW 20 MIN: CPT | Mod: PBBFAC,PO | Performed by: INTERNAL MEDICINE

## 2019-01-04 PROCEDURE — 99999 PR PBB SHADOW E&M-EST. PATIENT-LVL III: ICD-10-PCS | Mod: PBBFAC,,, | Performed by: INTERNAL MEDICINE

## 2019-01-04 PROCEDURE — 99214 PR OFFICE/OUTPT VISIT, EST, LEVL IV, 30-39 MIN: ICD-10-PCS | Mod: S$PBB,,, | Performed by: INTERNAL MEDICINE

## 2019-01-04 NOTE — PROGRESS NOTES
"Patient is a 63-year-old man who comes today for follow-up of his diabetes.  Patient is been much more compliant with his medications.  He has also been more compliant with his diet.  Please refer to my last note.      Current meds have been verified and updated per the EMR  Exam:/82 (BP Location: Left arm, Patient Position: Sitting, BP Method: Large (Manual))   Pulse 107   Temp 98.2 °F (36.8 °C) (Tympanic)   Ht 5' 8.5" (1.74 m)   Wt 129.3 kg (285 lb 0.9 oz)   SpO2 96%   BMI 42.71 kg/m²   Chest clear    Lab Results   Component Value Date    WBC 7.46 12/28/2018    HGB 13.5 (L) 12/28/2018    HCT 45.7 12/28/2018     12/28/2018    CHOL 142 12/28/2018    TRIG 87 12/28/2018    HDL 49 12/28/2018    ALT 18 12/28/2018    AST 16 12/28/2018     12/28/2018     12/28/2018    K 4.1 12/28/2018    K 4.1 12/28/2018     12/28/2018     12/28/2018    CREATININE 1.5 (H) 12/28/2018    CREATININE 1.5 (H) 12/28/2018    BUN 19 12/28/2018    BUN 19 12/28/2018    CO2 31 (H) 12/28/2018    CO2 31 (H) 12/28/2018    TSH 0.771 12/28/2018    PSA 0.25 03/06/2018    INR 1.0 06/18/2015    HGBA1C 6.8 (H) 12/28/2018       Impression:  Diabetes, control markedly improved  Multiple other medical problems below, stable  Patient Active Problem List   Diagnosis    Anemia associated with chronic renal failure    Obesity    Acquired renal cyst of left kidney    Nephrolithiasis    Sleep apnea    Pseudophakia    CAD (coronary artery disease)    Living-related donor kidney transplant (daughter) - 6/12/15    Diabetic peripheral neuropathy    Chronic immunosuppression with Prograf, MMF and prednisone    Secondary hyperparathyroidism of renal origin    CKD (chronic kidney disease) stage 3, GFR 30-59 ml/min    Type II diabetes mellitus with renal manifestations    Hypertension associated with stage 3 chronic kidney disease due to type 2 diabetes mellitus    Nocturia    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 "    DM (diabetes mellitus), type 2 with complications    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, with long-term current use of insulin    Epiretinal membrane (ERM) of both eyes    History of colon polyps    Colon polyps    Benign prostatic hyperplasia without lower urinary tract symptoms       Plan:  Orders Placed This Encounter    Hemoglobin A1c    Lipid panel    Basic metabolic panel    TSH    CBC auto differential     Medications remain the same.  He will see me again in 6 months with above lab work.

## 2019-01-06 DIAGNOSIS — Z94.0 KIDNEY REPLACED BY TRANSPLANT: ICD-10-CM

## 2019-01-07 RX ORDER — KETOCONAZOLE 200 MG/1
100 TABLET ORAL DAILY
Qty: 15 TABLET | Refills: 11 | Status: SHIPPED | OUTPATIENT
Start: 2019-01-07 | End: 2019-03-15

## 2019-02-25 ENCOUNTER — TELEPHONE (OUTPATIENT)
Dept: OPHTHALMOLOGY | Facility: CLINIC | Age: 64
End: 2019-02-25

## 2019-02-25 NOTE — TELEPHONE ENCOUNTER
----- Message from Keira Cool sent at 2/25/2019  7:12 AM CST -----  Contact: Patienst  daughter, Edward Leon is requesting an appt with Dr Zuniga, please call her back at [phone]. Thank ubruno    Returned call, no answer.  I left message that I have him scheduled for tomorrow.  Gave my nurse station number to call back if appt. Is not good.

## 2019-03-04 ENCOUNTER — OFFICE VISIT (OUTPATIENT)
Dept: OPHTHALMOLOGY | Facility: CLINIC | Age: 64
End: 2019-03-04
Payer: COMMERCIAL

## 2019-03-04 DIAGNOSIS — Z79.4 TYPE 2 DIABETES MELLITUS WITH STABLE PROLIFERATIVE RETINOPATHY OF BOTH EYES, WITH LONG-TERM CURRENT USE OF INSULIN: Primary | ICD-10-CM

## 2019-03-04 DIAGNOSIS — H26.492 PCO (POSTERIOR CAPSULAR OPACIFICATION), LEFT: ICD-10-CM

## 2019-03-04 DIAGNOSIS — E11.3553 TYPE 2 DIABETES MELLITUS WITH STABLE PROLIFERATIVE RETINOPATHY OF BOTH EYES, WITH LONG-TERM CURRENT USE OF INSULIN: Primary | ICD-10-CM

## 2019-03-04 PROCEDURE — 92014 PR EYE EXAM, EST PATIENT,COMPREHESV: ICD-10-PCS | Mod: S$GLB,,, | Performed by: OPHTHALMOLOGY

## 2019-03-04 PROCEDURE — 99999 PR PBB SHADOW E&M-EST. PATIENT-LVL III: CPT | Mod: PBBFAC,,, | Performed by: OPHTHALMOLOGY

## 2019-03-04 PROCEDURE — 92134 POSTERIOR SEGMENT OCT RETINA (OCULAR COHERENCE TOMOGRAPHY)-BOTH EYES: ICD-10-PCS | Mod: S$GLB,,, | Performed by: OPHTHALMOLOGY

## 2019-03-04 PROCEDURE — 92134 CPTRZ OPH DX IMG PST SGM RTA: CPT | Mod: S$GLB,,, | Performed by: OPHTHALMOLOGY

## 2019-03-04 PROCEDURE — 99999 PR PBB SHADOW E&M-EST. PATIENT-LVL III: ICD-10-PCS | Mod: PBBFAC,,, | Performed by: OPHTHALMOLOGY

## 2019-03-04 PROCEDURE — 92014 COMPRE OPH EXAM EST PT 1/>: CPT | Mod: S$GLB,,, | Performed by: OPHTHALMOLOGY

## 2019-03-04 RX ORDER — METFORMIN HYDROCHLORIDE 500 MG/1
500 TABLET ORAL 2 TIMES DAILY WITH MEALS
Qty: 180 TABLET | Refills: 3 | Status: SHIPPED | OUTPATIENT
Start: 2019-03-04 | End: 2019-09-26

## 2019-03-04 RX ORDER — METFORMIN HYDROCHLORIDE 500 MG/1
500 TABLET ORAL 2 TIMES DAILY WITH MEALS
Qty: 180 TABLET | Refills: 3 | Status: CANCELLED | OUTPATIENT
Start: 2019-03-04 | End: 2020-03-03

## 2019-03-04 NOTE — PROGRESS NOTES
===============================  03/04/2019   Mitch Whittaker,   63 y.o. male   Last visit [facility]: :4/20/2018   Last visit eye dept. Visit date not found  [facility]:  Uncorrected distance visual acuity was 20/25 in the right eye and 20/25 in the left eye.  Tonometry     Tonometry (Applanation, 10:36 AM)       Right Left    Pressure 18 18               Not recorded         Not recorded        Chief Complaint   Patient presents with    Diabetes     yearly exam - c/o left eye cloudiness        HPI     Diabetes      Additional comments: yearly exam - c/o left eye cloudiness              Comments     DM since approx 1996  PDR S/P PRP OU  Old Focal OU  Old OS TRD  ERM OU  PCIOL OU  KIDNEY TRANSPLANT 6/12/15          Last edited by BETTY Zuniga MD on 3/4/2019 11:10 AM. (History)          ________________  3/4/2019  Problem List Items Addressed This Visit        Eye/Vision problems    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, with long-term current use of insulin - Primary    Overview     DM since 1996  H/o PRP and Focal OU  OS TRD  H/O Kidney transplant 6/12/15  A1C 10.2 2/17         PCO (posterior capsular opacification), left    Relevant Orders    Yag Capsulotomy - OS - Left Eye          .       ===========================

## 2019-03-11 ENCOUNTER — TELEPHONE (OUTPATIENT)
Dept: TRANSPLANT | Facility: CLINIC | Age: 64
End: 2019-03-11

## 2019-03-11 NOTE — TELEPHONE ENCOUNTER
----- Message from Sean Lobato RN sent at 3/11/2019 11:01 AM CDT -----  Contact: Marybeth Handley (Daughter)[phone]      ----- Message -----  From: Raeann Hilton  Sent: 3/11/2019  10:31 AM  To: [facility] Post-Kidney Transplant Clinical    Needs Advice    Reason for call:She states that his in the hospital and she wants to know what his last creatine  Communication Preference:call    Additional Information:

## 2019-03-15 ENCOUNTER — TELEPHONE (OUTPATIENT)
Dept: CARDIOLOGY | Facility: CLINIC | Age: 64
End: 2019-03-15

## 2019-03-15 ENCOUNTER — OFFICE VISIT (OUTPATIENT)
Dept: CARDIOLOGY | Facility: CLINIC | Age: 64
End: 2019-03-15
Payer: COMMERCIAL

## 2019-03-15 VITALS
HEART RATE: 120 BPM | WEIGHT: 273.13 LBS | SYSTOLIC BLOOD PRESSURE: 102 MMHG | DIASTOLIC BLOOD PRESSURE: 70 MMHG | HEIGHT: 68 IN | BODY MASS INDEX: 41.39 KG/M2

## 2019-03-15 DIAGNOSIS — E78.49 OTHER HYPERLIPIDEMIA: ICD-10-CM

## 2019-03-15 DIAGNOSIS — I25.10 CORONARY ARTERY DISEASE INVOLVING NATIVE CORONARY ARTERY OF NATIVE HEART WITHOUT ANGINA PECTORIS: ICD-10-CM

## 2019-03-15 DIAGNOSIS — N18.30 CKD (CHRONIC KIDNEY DISEASE) STAGE 3, GFR 30-59 ML/MIN: ICD-10-CM

## 2019-03-15 DIAGNOSIS — I50.42 CHRONIC COMBINED SYSTOLIC AND DIASTOLIC CONGESTIVE HEART FAILURE: Primary | ICD-10-CM

## 2019-03-15 DIAGNOSIS — E66.01 CLASS 3 SEVERE OBESITY DUE TO EXCESS CALORIES WITH SERIOUS COMORBIDITY AND BODY MASS INDEX (BMI) OF 40.0 TO 44.9 IN ADULT: ICD-10-CM

## 2019-03-15 DIAGNOSIS — N18.30 HYPERTENSION ASSOCIATED WITH STAGE 3 CHRONIC KIDNEY DISEASE DUE TO TYPE 2 DIABETES MELLITUS: ICD-10-CM

## 2019-03-15 DIAGNOSIS — I12.9 HYPERTENSION ASSOCIATED WITH STAGE 3 CHRONIC KIDNEY DISEASE DUE TO TYPE 2 DIABETES MELLITUS: ICD-10-CM

## 2019-03-15 DIAGNOSIS — E11.22 HYPERTENSION ASSOCIATED WITH STAGE 3 CHRONIC KIDNEY DISEASE DUE TO TYPE 2 DIABETES MELLITUS: ICD-10-CM

## 2019-03-15 DIAGNOSIS — G47.33 OBSTRUCTIVE SLEEP APNEA SYNDROME: ICD-10-CM

## 2019-03-15 DIAGNOSIS — I50.43 ACUTE ON CHRONIC COMBINED SYSTOLIC AND DIASTOLIC CONGESTIVE HEART FAILURE: Primary | ICD-10-CM

## 2019-03-15 PROCEDURE — 99999 PR PBB SHADOW E&M-EST. PATIENT-LVL III: CPT | Mod: PBBFAC,,, | Performed by: INTERNAL MEDICINE

## 2019-03-15 PROCEDURE — 3008F PR BODY MASS INDEX (BMI) DOCUMENTED: ICD-10-PCS | Mod: CPTII,S$GLB,, | Performed by: INTERNAL MEDICINE

## 2019-03-15 PROCEDURE — 3044F PR MOST RECENT HEMOGLOBIN A1C LEVEL <7.0%: ICD-10-PCS | Mod: CPTII,S$GLB,, | Performed by: INTERNAL MEDICINE

## 2019-03-15 PROCEDURE — 3008F BODY MASS INDEX DOCD: CPT | Mod: CPTII,S$GLB,, | Performed by: INTERNAL MEDICINE

## 2019-03-15 PROCEDURE — 3074F SYST BP LT 130 MM HG: CPT | Mod: CPTII,S$GLB,, | Performed by: INTERNAL MEDICINE

## 2019-03-15 PROCEDURE — 99215 OFFICE O/P EST HI 40 MIN: CPT | Mod: S$GLB,,, | Performed by: INTERNAL MEDICINE

## 2019-03-15 PROCEDURE — 1101F PR PT FALLS ASSESS DOC 0-1 FALLS W/OUT INJ PAST YR: ICD-10-PCS | Mod: CPTII,S$GLB,, | Performed by: INTERNAL MEDICINE

## 2019-03-15 PROCEDURE — 3078F DIAST BP <80 MM HG: CPT | Mod: CPTII,S$GLB,, | Performed by: INTERNAL MEDICINE

## 2019-03-15 PROCEDURE — 3044F HG A1C LEVEL LT 7.0%: CPT | Mod: CPTII,S$GLB,, | Performed by: INTERNAL MEDICINE

## 2019-03-15 PROCEDURE — 3078F PR MOST RECENT DIASTOLIC BLOOD PRESSURE < 80 MM HG: ICD-10-PCS | Mod: CPTII,S$GLB,, | Performed by: INTERNAL MEDICINE

## 2019-03-15 PROCEDURE — 1101F PT FALLS ASSESS-DOCD LE1/YR: CPT | Mod: CPTII,S$GLB,, | Performed by: INTERNAL MEDICINE

## 2019-03-15 PROCEDURE — 99215 PR OFFICE/OUTPT VISIT, EST, LEVL V, 40-54 MIN: ICD-10-PCS | Mod: S$GLB,,, | Performed by: INTERNAL MEDICINE

## 2019-03-15 PROCEDURE — 3074F PR MOST RECENT SYSTOLIC BLOOD PRESSURE < 130 MM HG: ICD-10-PCS | Mod: CPTII,S$GLB,, | Performed by: INTERNAL MEDICINE

## 2019-03-15 PROCEDURE — 99999 PR PBB SHADOW E&M-EST. PATIENT-LVL III: ICD-10-PCS | Mod: PBBFAC,,, | Performed by: INTERNAL MEDICINE

## 2019-03-15 NOTE — Clinical Note
I would like to start Entresto but do not want to damage his transplant kidney, pt is very afraid of any further renal dysfunction.

## 2019-03-15 NOTE — TELEPHONE ENCOUNTER
Called and spouse with daughter.  Explained 40 ounces or 1.5 liter, explained liquids includes jello, ice cream, popsicles, liquid from soups, sobert, anything that turns to liquid form.    Daughter verbalized understanding.

## 2019-03-15 NOTE — TELEPHONE ENCOUNTER
----- Message from Micah Muhammad MD sent at 3/15/2019  2:32 PM CDT -----  Regarding: Will start Entresto  I discussed with renal docs, he can start Entresto. Can you tell him to  meds in [facility] pharmacy and start and get BMP on Monday or Tuesday? Continue rest of meds as discussed.     I will see him in follow up in 3-4 weeks. Thanks

## 2019-03-15 NOTE — PROGRESS NOTES
Subjective:   Patient ID:  Mitch Whittaker is a 63 y.o. male who presents for cardiac consult of Congestive Heart Failure (hosp f/u//OLOL) and Coronary Artery Disease      HPI  The patient came in today for cardiac consult of Congestive Heart Failure (hosp f/u//OLOL) and Coronary Artery Disease    3/15/19  Mitch Whittaker is a 63 y.o. male with current medical conditions non obs CAD, HFrEF, CKD, DM, MARION, HTN, HLD, ESRD s/p renal transplant presents for follow up CV eval.     Pt of Dr. Morris, last seen in clinic 2016. Pt presents after recent admission for CHF at [facility] -   Patient is a 63-year-old gentleman who presented to the hospital with shortness of breath. He was found to have pulmonary edema on chest x-ray and bilateral pedal edema. He was admitted for treatment of acute CHF. Echo showed reduced ejection fraction at 25-30%. He was treated with IV Lasix 80 mg twice daily that has been transitioned to Lasix p.o. Patient is not euvolemic and his creatinine is trending up for this reason I am cutting back on Lasix to 40 mg twice daily. Can follow-up with cardiology in 3 days. He sees one [facility]. He was seen by Dr. Omayra Esparza from [facility]. Recommendation is to start him on Entresto once his renal functions stabilize.    Pt had edema and SOB while in bed and then went to hospital. Prior to admission he was on lasix PRN. He will see Dr. Pacheco for nephro eval, transplant nephro Dr. Dejesus.  Has been feeling good on lasix 40 BID, has been walking well overall. Does not have CPAP machine.     Patient feels no chest pain, no sob, no leg swelling, no PND, no palpitation, no dizziness, no syncope, no CNS symptoms.    Patient has fairly good exercise tolerance.    Patient is compliant with medications.    3/10/19 ECHO  Normal left ventricle cavity size. Severely (25-30%) decreased ejection   fraction. Mild concentric hypertrophy observed. Left ventricular diastolic   dysfunction.      Nuclear  Stress  Nuclear Quantitative Functional Analysis:   LVEF: 46 %  LVED Volume: 144 ml  LVES Volume: 91 ml    Impression: NORMAL MYOCARDIAL PERFUSION  1. The perfusion scan is free of evidence for myocardial ischemia or injury.   2. Resting wall motion is physiologic.   3. There is resting LV dysfunction with a reduced ejection fraction of 46 %.   4. The ventricular volumes are normal at rest and stress.   5. The extracardiac distribution of radioactivity is normal.     This document has been electronically    SIGNED BY: Migel Morris MD On: 06/20/2018 15:11  Past Medical History:   Diagnosis Date    Acquired renal cyst of left kidney     Anemia associated with chronic renal failure     CAD (coronary artery disease)     nonobstructive c 9/14    CHF (congestive heart failure)     Chronic immunosuppression with Prograf and MMF 6/18/2015    CKD (chronic kidney disease) stage 3, GFR 30-59 ml/min     Diabetic retinopathy     DM (diabetes mellitus), type 2 with complications 1994    Edema     End stage kidney disease     s/p transplant, doing well    Gallbladder polyp     Heart failure, diastolic, due to HTN     Hemodialysis status     off since transplant    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     History of colon polyps     HPTH (hyperparathyroidism)     Hyperlipidemia     Hypertension associated with stage 3 chronic kidney disease due to type 2 diabetes mellitus     Obesity     PCO (posterior capsular opacification), left 3/4/2019    Proteinuria     resolved s/p transplant    S/P kidney transplant     Sleep apnea     Type 2 diabetes, uncontrolled, with retinopathy     Type II diabetes mellitus with renal manifestations        Past Surgical History:   Procedure Laterality Date    CARDIAC CATHETERIZATION  2008    normal coronary    COLONOSCOPY N/A 4/5/2018    [facility]illo [facility] ENDO    Colonoscopy N/A 11/12/2014    Performed by José Luis  "YULIA Kevin MD at [facility] ENDO    HEART CATH-LEFT Left 9/29/2014    Performed by Migel Morris MD at [facility] CATH LAB    INSERTION, CATHETER, VASCULAR Right 7/9/2013    Performed by Javy Vang MD at [facility] CATH LAB    INSERTION, GRAFT, ARTERIOVENOUS Right 7/29/2013    Performed by Grover Cesar MD at [facility] OR    KIDNEY TRANSPLANT      RETINAL LASER PROCEDURE      TRANSPLANT-KIDNEY N/A 6/12/2015    Performed by Carlota Wilson MD at [facility] OR [address] [place] [address]       Social History     Tobacco Use    Smoking status: Never Smoker    Smokeless tobacco: Former User    Tobacco comment: used marijuana since [id number], stopped after started dialysis   Substance Use Topics    Alcohol use: No     Alcohol/week: 0.0 oz    Drug use: No     Comment:         Family History   Problem Relation Age of Onset    Diabetes Mother     Hypertension Mother     Heart failure Mother     Kidney disease Sister         ESRD    Diabetes Sister     Diabetes Maternal Grandmother     Cancer Neg Hx        Patient's Medications   New Prescriptions    No medications on file   Previous Medications    ASPIRIN (ECOTRIN) 81 MG EC TABLET    Take 1 tablet by mouth Daily.     BD ULTRA-FINE MINI PEN NEEDLE 31 GAUGE X 3/16" NDLE    Use to inject insulin as needed up to 4 times daily    BISACODYL (DULCOLAX) 5 MG EC TABLET    Take 5 mg by mouth daily as needed for Constipation.    BLOOD SUGAR DIAGNOSTIC STRP    Use to test four times per day    ERGOCALCIFEROL (VITAMIN D2) 50,000 UNIT CAP    Take 1 capsule by mouth every 7 days    FAMOTIDINE (PEPCID) 20 MG TABLET    TAKE ONE TABLET BY MOUTH EVERY EVENING    FINASTERIDE (PROSCAR) 5 MG TABLET    Take 1 tablet (5 mg total) by mouth once daily.    FISH,BORA,FLAX OILS-OM3,6,9NO1 1,200 MG CAP    Take by mouth.    FLUTICASONE (FLONASE) 50 MCG/ACTUATION NASAL SPRAY    use 2 sprays (100 mcg total) in each nostril once daily    FUROSEMIDE (LASIX) 40 MG TABLET    Take 1 tablet (40 mg total) by mouth daily as needed. " "   FUROSEMIDE (LASIX) 80 MG TABLET    Take 0.5 tablets by mouth 2 (two) times daily.    GLIMEPIRIDE (AMARYL) 4 MG TABLET    Take 1 tablet (4 mg total) by mouth before breakfast.    INSULIN ASPART U-100 (NOVOLOG FLEXPEN U-100 INSULIN) 100 UNIT/ML INPN PEN    Inject 25 Units into the skin 3 (three) times daily with meals.    INSULIN GLARGINE 100 UNITS/ML (3ML) SUBQ PEN    Inject 40 Units into the skin 2 (two) times daily.    KETOCONAZOLE (NIZORAL) 200 MG TAB    Take 1 tablet by mouth daily for 14 days.    LANCETS 33 GAUGE MISC    1 Stick by Misc.(Non-Drug; Combo Route) route as directed. Contour lancets. Use to check BG 2-3 times daily.    MAGNESIUM OXIDE (MAG-OX) 400 MG (241.3 MG MAGNESIUM) TABLET    Take 1 tablet (400 mg total) by mouth 2 (two) times daily.    METFORMIN (GLUCOPHAGE) 500 MG TABLET    Take 1 tablet (500 mg total) by mouth 2 (two) times daily with meals.    METOPROLOL SUCCINATE (TOPROL-XL) 25 MG 24 HR TABLET    TAKE TWO TABLETS BY MOUTH ONCE DAILY    MULTIVITAMIN (THERAGRAN) TABLET    Take 1 tablet by mouth once daily.    MULTIVITAMIN CAPSULE    Take 1 capsule by mouth once daily.    MYCOPHENOLATE (CELLCEPT) 250 MG CAP    TAKE THREE CAPSULES BY MOUTH TWICE A DAY    OMEGA-3 FATTY ACIDS-VITAMIN E (FISH OIL) 1,000 MG CAP    Take 1 capsule by mouth once daily.     PEN NEEDLE, DIABETIC (BD INSULIN PEN NEEDLE UF SHORT) 31 GAUGE X 5/16" NDLE    USE TO INJECT INSULIN TWICE A DAY    POLYETHYLENE GLYCOL (GLYCOLAX) 17 GRAM PWPK    Take 17 grams by mouth daily for 7 days.    PREDNISONE (DELTASONE) 5 MG TABLET    Take 1 tablet (5 mg total) by mouth once daily.    ROSUVASTATIN (CRESTOR) 40 MG TAB    TAKE ONE TABLET BY MOUTH EVERY EVENING    TACROLIMUS (PROGRAF) 1 MG CAP    TAKE 5 CAPSULES BY MOUTH EVERY MORNING AND 4 CAPSULES EVERY EVENING    TAMSULOSIN (FLOMAX) 0.4 MG CAP    Take 1 capsule (0.4 mg total) by mouth once daily.   Modified Medications    No medications on file   Discontinued Medications    AMLODIPINE " "(NORVASC) 2.5 MG TABLET    Take 1 tablet (2.5 mg total) by mouth once daily.    KETOCONAZOLE (NIZORAL) 200 MG TAB    Take 0.5 tablets (100 mg total) by mouth once daily.       Review of Systems   Constitutional: Negative.    HENT: Negative.    Eyes: Negative.    Respiratory: Negative.    Cardiovascular: Negative.    Gastrointestinal: Negative.    Genitourinary: Negative.    Musculoskeletal: Negative.    Skin: Negative.    Neurological: Negative.    Endo/Heme/Allergies: Negative.    Psychiatric/Behavioral: Negative.    All 12 systems otherwise negative.      Wt Readings from Last 3 Encounters:   03/15/19 123.9 kg (273 lb 2.4 oz)   01/04/19 129.3 kg (285 lb 0.9 oz)   12/17/18 130.5 kg (287 lb 11.2 oz)     Temp Readings from Last 3 Encounters:   01/04/19 98.2 °F (36.8 °C) (Tympanic)   12/04/18 98 °F (36.7 °C) (Oral)   12/03/18 98.5 °F (36.9 °C) (Tympanic)     BP Readings from Last 3 Encounters:   03/15/19 102/70   01/04/19 136/82   12/17/18 130/70     Pulse Readings from Last 3 Encounters:   03/15/19 (!) 120   01/04/19 107   12/17/18 90       /70 (BP Method: Large (Manual))   Pulse (!) 120   Ht 5' 8" (1.727 m)   Wt 123.9 kg (273 lb 2.4 oz)   BMI 41.53 kg/m²     Objective:   Physical Exam   Constitutional: He is oriented to person, place, and time. He appears well-developed and well-nourished. No distress.   HENT:   Head: Normocephalic and atraumatic.   Nose: Nose normal.   Mouth/Throat: Oropharynx is clear and moist.   Eyes: Conjunctivae and EOM are normal. No scleral icterus.   Neck: Normal range of motion. Neck supple. No JVD present. No thyromegaly present.   Cardiovascular: Normal rate, regular rhythm, S1 normal and S2 normal. Exam reveals no gallop, no S3, no S4 and no friction rub.   No murmur heard.  Pulmonary/Chest: Effort normal and breath sounds normal. No stridor. No respiratory distress. He has no wheezes. He has no rales. He exhibits no tenderness.   Abdominal: Soft. Bowel sounds are normal. He " exhibits no distension and no mass. There is no tenderness. There is no rebound.   Genitourinary:   Genitourinary Comments: Deferred   Musculoskeletal: Normal range of motion. He exhibits no edema, tenderness or deformity.   Lymphadenopathy:     He has no cervical adenopathy.   Neurological: He is alert and oriented to person, place, and time. He exhibits normal muscle tone. Coordination normal.   Skin: Skin is warm and dry. No rash noted. He is not diaphoretic. No erythema. No pallor.   Psychiatric: He has a normal mood and affect. His behavior is normal. Judgment and thought content normal.   Nursing note and vitals reviewed.      Lab Results   Component Value Date     12/28/2018     12/28/2018    K 4.1 12/28/2018    K 4.1 12/28/2018     12/28/2018     12/28/2018    CO2 31 (H) 12/28/2018    CO2 31 (H) 12/28/2018    BUN 19 12/28/2018    BUN 19 12/28/2018    CREATININE 1.5 (H) 12/28/2018    CREATININE 1.5 (H) 12/28/2018    GLU 95 12/28/2018    GLU 95 12/28/2018    HGBA1C 6.8 (H) 12/28/2018    MG 1.4 (L) 12/28/2018    AST 16 12/28/2018    ALT 18 12/28/2018    ALBUMIN 3.4 (L) 12/28/2018    ALBUMIN 3.4 (L) 12/28/2018    PROT 6.7 12/28/2018    BILITOT 0.6 12/28/2018    WBC 7.46 12/28/2018    HGB 13.5 (L) 12/28/2018    HCT 45.7 12/28/2018    HCT 36 06/12/2015    MCV 86 12/28/2018     12/28/2018    INR 1.0 06/18/2015    TSH 0.771 12/28/2018    CHOL 142 12/28/2018    HDL 49 12/28/2018    LDLCALC 75.6 12/28/2018    TRIG 87 12/28/2018     (H) 12/04/2018     Assessment:      1. Acute on chronic combined systolic and diastolic congestive heart failure    2. Coronary artery disease involving native coronary artery of native heart without angina pectoris    3. Hypertension associated with stage 3 chronic kidney disease due to type 2 diabetes mellitus    4. Other hyperlipidemia    5. CKD (chronic kidney disease) stage 3, GFR 30-59 ml/min    6. Class 3 severe obesity due to excess calories  with serious comorbidity and body mass index (BMI) of 40.0 to 44.9 in adult    7. Obstructive sleep apnea syndrome        Plan:   1. Acute on chronic CHF exac EF 25-30%  - cont lasix 40 BID  - cont Toprol, not on ace/ARB/Entresto due to renal function  - will refer to advanced CHF if further exac  - cont low salt diet, fluid restriction  - will need ICD eval discussed will repeat echo in 3 months    2. HTN  - cont meds  - low salt diet    3. HLD  - cont statin    4. ESRD s/p Transplant with CKD  - f/u with nephro    5. CAD, non obs  - cont meds  - stress 6/2018 negative    6. MARION  - refer for sleep    7. Obesity  -rec weight loss with diet/exercise     Spent > 45 minutes discussing results and treatment plan with patient and daughter on phone.     Thank you for allowing me to participate in this patient's care. Please do not hesitate to contact me with any questions or concerns. Consult note has been forwarded to the referral physician.

## 2019-03-15 NOTE — TELEPHONE ENCOUNTER
Called to inform patient of new medication Entresto to be picked up at [facility] at [facility] and labs to be done on next week and four week follow up needed with Dr. Muhammad--left voice message to give our office a call back at [phone]

## 2019-03-15 NOTE — TELEPHONE ENCOUNTER
----- Message from Micah Muhammad MD sent at 3/15/2019  3:44 PM CDT -----  Contact: marion [phone]  40 ounces or 1.5 L usually but depends on how active or inactive he is..    ----- Message -----  From: Jamari Underwood RN  Sent: 3/15/2019   3:05 PM  To: Micah Muhammad MD    How much water water consumption daily?  ----- Message -----  From: Gali Azevedo  Sent: 3/15/2019  12:46 PM  To: Sabina Obrien Staff    Would like to consult with the nurse, patients was seen in the office this morning and has some question, patients would like to know how muck water he can drink a day please call back at [phone], thanks sj

## 2019-03-15 NOTE — PATIENT INSTRUCTIONS
Left-Sided Congestive Heart Failure (CHF)    The heart is a large muscle that pumps blood throughout the body. Blood carries oxygen to all the organs (including the brain), muscles, and skin of your body. After the body takes the oxygen out of the blood, the blood returns to the heart. The right side of the heart collects that blood and pumps it to the lungs to receive fresh oxygen. This oxygen-rich blood from the lungs then returns to the left side of the heart, where it is pumped back out to the brain and rest of the body, starting the process all over.   Heart failure occurs when the heart muscle is weakened. This can occur after a heart attack or with significant coronary artery disease. This affects the pumping action of the heart.   When the left side of the heart is weakened, it cant handle the blood it is getting from the lungs. Pressure then builds up in the veins of the lungs, causing fluid to leak into the lung tissues. This may be referred to as congestive heart failure. This causes you to feel short of breath, weak, or dizzy. These symptoms are often worse with exertion, such as climbing stairs or walking up hills. Lying flat is uncomfortable and can make your breathing worse. This may make sleeping difficult and force you to use extra pillows to elevate your upper body to help you sleep well.  Causes of heart failure include:  · Coronary artery disease  · Heart attack in the past (also known as acute myocardial infarction, or AMI)  · High blood pressure  · Damaged heart valve  · Diabetes  · Obesity  · Cigarette smoking  · Alcohol abuse  Treatment  Heart failure is a chronic condition. There is no cure. The purpose of medical treatment is to improve the pumping action of the heart, and remove excess water and fluids from the body. A number of medicines can help reach this goal, improve symptoms and keep the heart from becoming weaker. In some cases of severe heart failure, a mechanical device will be  placed in the heart to help the heart pump. Another major goal is to better treat the causes of heart failure, such as diabetes, high blood pressure, and your lifestyle.  Home care  · Check your weight every day. A sudden increase in weight gain could mean worsening heart failure.  ¨ Use the same scale every day.  ¨ Weigh yourself at the same time every day.  ¨ Make sure the scale is on the floor, not on a rug.  ¨ Keep a record of your weight every day, so your healthcare provider can see it. If you are not given a log sheet for this, keep a separate journal for this purpose.   · Cut back on how much salt (sodium) you eat:  ¨ Your provider will tell you how much salt to have daily, usually 2,000 mg or less.  ¨ Avoid high-salt foods. These include olives, pickles, smoked meats, processed foods, and salted potato chips.  ¨ Don't add salt to your food at the table. Use only small amounts of salt when cooking.  ¨ Avoid binging on salt-heavy meals.  · Follow your healthcare provider's recommendations about how much fluid you should have.  · Stop smoking.  · Cut back on the amount of alcohol you drink.  · Lose weight if you are overweight. The excess weight adds a lot of stress on the workload of the heart.  · Stay active. Talk to your provider about an exercise program that is safe for your heart.  · Keep your feet elevated to reduce swelling. Ask your provider about support hose as a preventive treatment for daytime leg swelling.  · Follow your healthcare provider's instructions closely.  Besides taking your medicine as instructed, an important part of treatment includes lifestyle changes. These include diet, physical activity, stopping smoking, and weight control.  Improve your diet. Often in the hospital, people are given a heart healthy diet. This includes more fresh foods, lower saturated fat, less processed foods, and lower salt.  Follow-up care  Follow up with your healthcare provider, or as advised. Make sure to  keep any appointments that were made for you. This can help better control heart failure.  If an X-ray was done, you will be told of any new findings that may affect your care.  Call 911  Call 911 if you:  · Become severely short of breath  · Feel lightheaded, or feel like you might pass out or faint  · Have chest pain or discomfort that is different than usual, the medicines your provider told you to use for this don't help, or the pain lasts longer than 10 to 15 minutes  · Develop a rapid heart rate suddenly  When to seek medical advice  Call your healthcare provider right away if you have any of these signs of worsening heart failure:  · Sudden weight gain --  more than 2 pounds in 1 day, or 5 pounds in 1 week, or whatever weight gain you were told to report by your provider  · Trouble breathing not related to being active  · New or increased swelling of your legs or ankles  · Swelling or pain in your abdomen  · Breathing trouble at night, waking up short of breath or needing more pillows to elevate your upper body to help you breathe  · Frequent coughing that doesnt go away  · Feeling much more tired than usual  Date Last Reviewed: 1/4/2016  © 6999-0802 [employer]. [address] [place] [address], [place] [address]. All rights reserved. This information is not intended as a substitute for professional medical care. Always follow your healthcare professional's instructions.

## 2019-03-18 ENCOUNTER — TELEPHONE (OUTPATIENT)
Dept: CARDIOLOGY | Facility: CLINIC | Age: 64
End: 2019-03-18

## 2019-03-18 ENCOUNTER — TELEPHONE (OUTPATIENT)
Dept: NEPHROLOGY | Facility: CLINIC | Age: 64
End: 2019-03-18

## 2019-03-18 DIAGNOSIS — N18.30 CKD (CHRONIC KIDNEY DISEASE) STAGE 3, GFR 30-59 ML/MIN: Primary | ICD-10-CM

## 2019-03-18 NOTE — TELEPHONE ENCOUNTER
----- Message from Cynthia Norton sent at 3/18/2019 11:36 AM CDT -----  Contact: Pt.   Pt is calling .Type:  Sooner Apoointment Request  Pt is requesting a sooner appointment.  Pt declined first available appointment listed below.  Caller will not accept being placed on the waitlist and is requesting a message be sent to doctor.  Name of Caller Pt   When is the first available appointment? Not until May   Symptoms: Pt states that he was advised by heart doctor that he needs to be seen sooner.   Would the patient rather a call back or a response via [facility]? Call back   Best Call Back Number:[phone]       .Thank You  Alexsandra Norton

## 2019-03-18 NOTE — TELEPHONE ENCOUNTER
----- Message from Danae Ac sent at 3/18/2019 10:30 AM CDT -----  Contact: pt daughter - Ms Mccullough  Stated she's calling to verify a prescription was called in for the pt, because at the time of his visit he wasn't told the doctor was giving him anything, she can be reached at  [phone] Thanks

## 2019-03-18 NOTE — TELEPHONE ENCOUNTER
Returned call to patient's daughter Marybeth--informed her that Dr. Muhammad made the decision to order Entresto for patient after consulting with patient's Renal doctor--informed her that the Entresto is for the CHF--Marybeth verbalizes understanding--appointment for labs on 3/21/19 and follow appointment with Dr. Muhammad on 4/17/19 has been scheduled

## 2019-03-19 ENCOUNTER — OFFICE VISIT (OUTPATIENT)
Dept: INTERNAL MEDICINE | Facility: CLINIC | Age: 64
End: 2019-03-19
Payer: COMMERCIAL

## 2019-03-19 VITALS
HEIGHT: 68 IN | OXYGEN SATURATION: 98 % | DIASTOLIC BLOOD PRESSURE: 80 MMHG | TEMPERATURE: 98 F | BODY MASS INDEX: 41.43 KG/M2 | RESPIRATION RATE: 16 BRPM | WEIGHT: 273.38 LBS | SYSTOLIC BLOOD PRESSURE: 130 MMHG | HEART RATE: 80 BPM

## 2019-03-19 DIAGNOSIS — E11.21 TYPE II DIABETES MELLITUS WITH NEPHROPATHY: ICD-10-CM

## 2019-03-19 PROCEDURE — 99999 PR PBB SHADOW E&M-EST. PATIENT-LVL III: CPT | Mod: PBBFAC,,, | Performed by: INTERNAL MEDICINE

## 2019-03-19 PROCEDURE — 99213 OFFICE O/P EST LOW 20 MIN: CPT | Mod: S$GLB,,, | Performed by: INTERNAL MEDICINE

## 2019-03-19 PROCEDURE — 1101F PT FALLS ASSESS-DOCD LE1/YR: CPT | Mod: CPTII,S$GLB,, | Performed by: INTERNAL MEDICINE

## 2019-03-19 PROCEDURE — 3079F DIAST BP 80-89 MM HG: CPT | Mod: CPTII,S$GLB,, | Performed by: INTERNAL MEDICINE

## 2019-03-19 PROCEDURE — 1101F PR PT FALLS ASSESS DOC 0-1 FALLS W/OUT INJ PAST YR: ICD-10-PCS | Mod: CPTII,S$GLB,, | Performed by: INTERNAL MEDICINE

## 2019-03-19 PROCEDURE — 3008F BODY MASS INDEX DOCD: CPT | Mod: CPTII,S$GLB,, | Performed by: INTERNAL MEDICINE

## 2019-03-19 PROCEDURE — 99999 PR PBB SHADOW E&M-EST. PATIENT-LVL III: ICD-10-PCS | Mod: PBBFAC,,, | Performed by: INTERNAL MEDICINE

## 2019-03-19 PROCEDURE — 3044F HG A1C LEVEL LT 7.0%: CPT | Mod: CPTII,S$GLB,, | Performed by: INTERNAL MEDICINE

## 2019-03-19 PROCEDURE — 3044F PR MOST RECENT HEMOGLOBIN A1C LEVEL <7.0%: ICD-10-PCS | Mod: CPTII,S$GLB,, | Performed by: INTERNAL MEDICINE

## 2019-03-19 PROCEDURE — 3075F SYST BP GE 130 - 139MM HG: CPT | Mod: CPTII,S$GLB,, | Performed by: INTERNAL MEDICINE

## 2019-03-19 PROCEDURE — 3079F PR MOST RECENT DIASTOLIC BLOOD PRESSURE 80-89 MM HG: ICD-10-PCS | Mod: CPTII,S$GLB,, | Performed by: INTERNAL MEDICINE

## 2019-03-19 PROCEDURE — 3075F PR MOST RECENT SYSTOLIC BLOOD PRESS GE 130-139MM HG: ICD-10-PCS | Mod: CPTII,S$GLB,, | Performed by: INTERNAL MEDICINE

## 2019-03-19 PROCEDURE — 3008F PR BODY MASS INDEX (BMI) DOCUMENTED: ICD-10-PCS | Mod: CPTII,S$GLB,, | Performed by: INTERNAL MEDICINE

## 2019-03-19 PROCEDURE — 99213 PR OFFICE/OUTPT VISIT, EST, LEVL III, 20-29 MIN: ICD-10-PCS | Mod: S$GLB,,, | Performed by: INTERNAL MEDICINE

## 2019-03-19 RX ORDER — INSULIN GLARGINE 100 [IU]/ML
30 INJECTION, SOLUTION SUBCUTANEOUS 2 TIMES DAILY
Qty: 30 ML | Refills: 11 | Status: SHIPPED | OUTPATIENT
Start: 2019-03-19 | End: 2020-08-28

## 2019-03-19 RX ORDER — INSULIN ASPART 100 [IU]/ML
20 INJECTION, SOLUTION INTRAVENOUS; SUBCUTANEOUS
Qty: 30 ML | Refills: 11
Start: 2019-03-19 | End: 2019-09-20 | Stop reason: SDUPTHER

## 2019-03-19 NOTE — PROGRESS NOTES
"HPI:  Patient is a 63-year-old man who comes today for hospital follow-up.  He was admitted to [facility] for acute congestive heart failure.  I have reviewed her hospital records.  Patient is has subsequently been placed on Entresto by his cardiologist.  He is doing much better.  He denies any shortness of breath, chest pains, or palpitations.  His weight is been doing excellent.  He has had no more fluid retention.  He has been having more frequent hypoglycemic events.  It can happen day or night.    Current meds have been verified and updated per the EMR  Exam:/80 (BP Location: Left arm, Patient Position: Sitting)   Pulse 80   Temp 97.6 °F (36.4 °C) (Tympanic)   Resp 16   Ht 5' 8" (1.727 m)   Wt 124 kg (273 lb 5.9 oz)   SpO2 98%   BMI 41.57 kg/m²   Chest clear  Cardiovascular regular rate and rhythm without murmur gallop or rub  Extremities only with a trace of edema    Lab Results   Component Value Date    WBC 7.46 12/28/2018    HGB 13.5 (L) 12/28/2018    HCT 45.7 12/28/2018     12/28/2018    CHOL 142 12/28/2018    TRIG 87 12/28/2018    HDL 49 12/28/2018    ALT 18 12/28/2018    AST 16 12/28/2018     12/28/2018     12/28/2018    K 4.1 12/28/2018    K 4.1 12/28/2018     12/28/2018     12/28/2018    CREATININE 1.5 (H) 12/28/2018    CREATININE 1.5 (H) 12/28/2018    BUN 19 12/28/2018    BUN 19 12/28/2018    CO2 31 (H) 12/28/2018    CO2 31 (H) 12/28/2018    TSH 0.771 12/28/2018    PSA 0.25 03/06/2018    INR 1.0 06/18/2015    HGBA1C 6.8 (H) 12/28/2018       Impression:  Congestive heart failure, much better since being on Entresto  Hypoglycemia, having frequent episodes  Multiple other medical problems below, stable  Patient Active Problem List   Diagnosis    Anemia associated with chronic renal failure    Obesity    Acquired renal cyst of left kidney    Nephrolithiasis    Sleep apnea    Pseudophakia    CAD (coronary artery disease)    Living-related donor " kidney transplant (daughter) - 6/12/15    Diabetic peripheral neuropathy    Chronic immunosuppression with Prograf, MMF and prednisone    Secondary hyperparathyroidism of renal origin    CKD (chronic kidney disease) stage 3, GFR 30-59 ml/min    Type II diabetes mellitus with renal manifestations    Hypertension associated with stage 3 chronic kidney disease due to type 2 diabetes mellitus    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    DM (diabetes mellitus), type 2 with complications    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, with long-term current use of insulin    Epiretinal membrane (ERM) of both eyes    History of colon polyps    Colon polyps    Benign prostatic hyperplasia without lower urinary tract symptoms    PCO (posterior capsular opacification), left    Chronic combined systolic and diastolic congestive heart failure       Plan:  Orders Placed This Encounter    insulin glargine 100 units/mL (3mL) SubQ pen    insulin aspart U-100 (NOVOLOG FLEXPEN U-100 INSULIN) 100 unit/mL InPn pen     Patient will decrease his long-acting insulin to 30 units twice a day and decrease his mealtime insulin to 20 units with meals.  If he still continues to have problems with hypoglycemia, he needs to let me know.  Patient will be seen otherwise at his regular appointment in July.  He needs to continue with follow-up with the nephrologist and cardiologist.  He needs to clarify why he is on ketoconazole and how long he needs to take it with his nephrologist.  I find nothing in the medical record to justify its use.    This note is generated with speech recognition software and is subject to transcription error and sound alike phrases that may be missed by proofreading.

## 2019-03-21 ENCOUNTER — PROCEDURE VISIT (OUTPATIENT)
Dept: OPHTHALMOLOGY | Facility: CLINIC | Age: 64
End: 2019-03-21
Attending: OPHTHALMOLOGY
Payer: COMMERCIAL

## 2019-03-21 ENCOUNTER — LAB VISIT (OUTPATIENT)
Dept: LAB | Facility: HOSPITAL | Age: 64
End: 2019-03-21
Attending: INTERNAL MEDICINE
Payer: COMMERCIAL

## 2019-03-21 DIAGNOSIS — H26.492 PCO (POSTERIOR CAPSULAR OPACIFICATION), LEFT: ICD-10-CM

## 2019-03-21 DIAGNOSIS — N18.30 CKD (CHRONIC KIDNEY DISEASE) STAGE 3, GFR 30-59 ML/MIN: ICD-10-CM

## 2019-03-21 LAB
ALBUMIN SERPL BCP-MCNC: 3.7 G/DL
ANION GAP SERPL CALC-SCNC: 12 MMOL/L
BASOPHILS # BLD AUTO: 0.01 K/UL
BASOPHILS NFR BLD: 0.1 %
BUN SERPL-MCNC: 27 MG/DL
CALCIUM SERPL-MCNC: 9.6 MG/DL
CHLORIDE SERPL-SCNC: 104 MMOL/L
CO2 SERPL-SCNC: 30 MMOL/L
CREAT SERPL-MCNC: 1.7 MG/DL
DIFFERENTIAL METHOD: ABNORMAL
EOSINOPHIL # BLD AUTO: 0 K/UL
EOSINOPHIL NFR BLD: 0.3 %
ERYTHROCYTE [DISTWIDTH] IN BLOOD BY AUTOMATED COUNT: 13.7 %
EST. GFR  (AFRICAN AMERICAN): 47.9 ML/MIN/1.73 M^2
EST. GFR  (NON AFRICAN AMERICAN): 41.4 ML/MIN/1.73 M^2
GLUCOSE SERPL-MCNC: 67 MG/DL
HCT VFR BLD AUTO: 50.6 %
HGB BLD-MCNC: 14.6 G/DL
IMM GRANULOCYTES # BLD AUTO: 0.03 K/UL
IMM GRANULOCYTES NFR BLD AUTO: 0.4 %
LYMPHOCYTES # BLD AUTO: 1.1 K/UL
LYMPHOCYTES NFR BLD: 15.9 %
MCH RBC QN AUTO: 25 PG
MCHC RBC AUTO-ENTMCNC: 28.9 G/DL
MCV RBC AUTO: 87 FL
MONOCYTES # BLD AUTO: 0.6 K/UL
MONOCYTES NFR BLD: 9.1 %
NEUTROPHILS # BLD AUTO: 5 K/UL
NEUTROPHILS NFR BLD: 74.2 %
NRBC BLD-RTO: 0 /100 WBC
PHOSPHATE SERPL-MCNC: 2.8 MG/DL
PLATELET # BLD AUTO: 231 K/UL
PMV BLD AUTO: 11.4 FL
POTASSIUM SERPL-SCNC: 3.6 MMOL/L
PTH-INTACT SERPL-MCNC: 358 PG/ML
RBC # BLD AUTO: 5.85 M/UL
SODIUM SERPL-SCNC: 146 MMOL/L
WBC # BLD AUTO: 6.79 K/UL

## 2019-03-21 PROCEDURE — 99499 NO LOS: ICD-10-PCS | Mod: S$GLB,,, | Performed by: OPHTHALMOLOGY

## 2019-03-21 PROCEDURE — 83970 ASSAY OF PARATHORMONE: CPT

## 2019-03-21 PROCEDURE — 36415 COLL VENOUS BLD VENIPUNCTURE: CPT

## 2019-03-21 PROCEDURE — 99499 UNLISTED E&M SERVICE: CPT | Mod: S$GLB,,, | Performed by: OPHTHALMOLOGY

## 2019-03-21 PROCEDURE — 80069 RENAL FUNCTION PANEL: CPT

## 2019-03-21 PROCEDURE — 85025 COMPLETE CBC W/AUTO DIFF WBC: CPT

## 2019-03-21 PROCEDURE — 66821 YAG CAPSULOTOMY - OS - LEFT EYE: ICD-10-PCS | Mod: LT,S$GLB,, | Performed by: OPHTHALMOLOGY

## 2019-03-21 PROCEDURE — 66821 AFTER CATARACT LASER SURGERY: CPT | Mod: LT,S$GLB,, | Performed by: OPHTHALMOLOGY

## 2019-03-21 RX ORDER — AMLODIPINE BESYLATE 2.5 MG/1
2.5 TABLET ORAL
COMMUNITY
End: 2019-03-25 | Stop reason: ALTCHOICE

## 2019-03-21 RX ORDER — AMOXICILLIN 500 MG
1 CAPSULE ORAL DAILY
COMMUNITY

## 2019-03-21 NOTE — PROGRESS NOTES
Follow up:    Yag Capsulotomy Procedure:    63 y.o. year old patient experiencing a symptomatic decrease in vision OS with inability to perform activities of daily living including reading.    SLE: Posterior intraocular lens implant with capsular fibrosis     Risks, benefits and alternatives of Yag Laser Capsulotomy discussed. Including risks of retinal detachment (1-3%), macular edema, dislocated implant, pain, inflammation elevated intraocular pressure and vision loss. Consent signed.    Medications given:   Iopidine gtt  Tetracaine gtt    Laser energy settings:  5.5  mJ / burst  142  bursts    IMPRESSION:  Yag Capsulotomy OS well tolerated    PLAN:     Postoperative precautions discussed  2 weeks post op check

## 2019-03-25 ENCOUNTER — OFFICE VISIT (OUTPATIENT)
Dept: NEPHROLOGY | Facility: CLINIC | Age: 64
End: 2019-03-25
Payer: COMMERCIAL

## 2019-03-25 VITALS — HEART RATE: 96 BPM | SYSTOLIC BLOOD PRESSURE: 98 MMHG | DIASTOLIC BLOOD PRESSURE: 62 MMHG

## 2019-03-25 DIAGNOSIS — R60.0 LOCALIZED EDEMA: ICD-10-CM

## 2019-03-25 DIAGNOSIS — Z94.0 S/P KIDNEY TRANSPLANT: Primary | ICD-10-CM

## 2019-03-25 PROCEDURE — 3078F PR MOST RECENT DIASTOLIC BLOOD PRESSURE < 80 MM HG: ICD-10-PCS | Mod: CPTII,S$GLB,, | Performed by: INTERNAL MEDICINE

## 2019-03-25 PROCEDURE — 1101F PR PT FALLS ASSESS DOC 0-1 FALLS W/OUT INJ PAST YR: ICD-10-PCS | Mod: CPTII,S$GLB,, | Performed by: INTERNAL MEDICINE

## 2019-03-25 PROCEDURE — 99215 PR OFFICE/OUTPT VISIT, EST, LEVL V, 40-54 MIN: ICD-10-PCS | Mod: S$GLB,,, | Performed by: INTERNAL MEDICINE

## 2019-03-25 PROCEDURE — 3074F SYST BP LT 130 MM HG: CPT | Mod: CPTII,S$GLB,, | Performed by: INTERNAL MEDICINE

## 2019-03-25 PROCEDURE — 1101F PT FALLS ASSESS-DOCD LE1/YR: CPT | Mod: CPTII,S$GLB,, | Performed by: INTERNAL MEDICINE

## 2019-03-25 PROCEDURE — 99215 OFFICE O/P EST HI 40 MIN: CPT | Mod: S$GLB,,, | Performed by: INTERNAL MEDICINE

## 2019-03-25 PROCEDURE — 3078F DIAST BP <80 MM HG: CPT | Mod: CPTII,S$GLB,, | Performed by: INTERNAL MEDICINE

## 2019-03-25 PROCEDURE — 99999 PR PBB SHADOW E&M-EST. PATIENT-LVL II: ICD-10-PCS | Mod: PBBFAC,,, | Performed by: INTERNAL MEDICINE

## 2019-03-25 PROCEDURE — 3074F PR MOST RECENT SYSTOLIC BLOOD PRESSURE < 130 MM HG: ICD-10-PCS | Mod: CPTII,S$GLB,, | Performed by: INTERNAL MEDICINE

## 2019-03-25 PROCEDURE — 99999 PR PBB SHADOW E&M-EST. PATIENT-LVL II: CPT | Mod: PBBFAC,,, | Performed by: INTERNAL MEDICINE

## 2019-03-25 RX ORDER — METOPROLOL SUCCINATE 25 MG/1
25 TABLET, EXTENDED RELEASE ORAL DAILY
Qty: 30 TABLET | Refills: 11
Start: 2019-03-25 | End: 2020-03-11 | Stop reason: SDUPTHER

## 2019-03-25 RX ORDER — FUROSEMIDE 40 MG/1
40 TABLET ORAL DAILY
Qty: 30 TABLET | Refills: 11
Start: 2019-03-25 | End: 2020-03-24

## 2019-03-25 RX ORDER — KETOCONAZOLE 200 MG/1
100 TABLET ORAL DAILY
Qty: 15 TABLET | Refills: 9
Start: 2019-03-25 | End: 2020-03-06 | Stop reason: SDUPTHER

## 2019-03-25 NOTE — PROGRESS NOTES
Subjective:       Patient ID: Mitch Whittaker is a 63 y.o.    male who presents for follow-up evaluation of s/o LRRT ( 6/2015- Dtr ) , HTN, CKD stage 3 , CHF       Cornelio Ham MD      HPI : Mitch Whittaker is a pleasant 63-year-old  gentleman seen in office today in follow-up for above medical problems.  He is status post living related renal transplant in June 2015 donated by his daughter.  Prior to that he was on hemodialysis for about 2 and half years at [facility] under care of Dr Danielle.  Patient had a right upper extremity AV fistula that was later removed after his transplant surgery.  Recent labs reviewed and discussed with the patient.  Recent serum creatinine increased from a baseline of about 1.4-1.7, likely volume depletion, currently on furosemide 40 mg twice a day, also he recently started Entresto twice a day , patient was recently admitted at [facility] for acute decompensated heart failure, is LV ejection fraction is about 25-30%, recent cardiology notes reviewed, he is morbidly obese, he has an appointment with pulmonology to evaluate for obstructive sleep apnea,        Past Medical History:   Diagnosis Date    Acquired renal cyst of left kidney     Anemia associated with chronic renal failure     CAD (coronary artery disease)     nonobstructive [facility] 9/14    CHF (congestive heart failure)     Chronic immunosuppression with Prograf and MMF 6/18/2015    CKD (chronic kidney disease) stage 3, GFR 30-59 ml/min     Diabetic retinopathy     DM (diabetes mellitus), type 2 with complications 1994    Edema     End stage kidney disease     s/p transplant, doing well    Gallbladder polyp     Heart failure, diastolic, due to HTN     Hemodialysis status     off since transplant    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     History of colon polyps     HPTH (hyperparathyroidism)     Hyperlipidemia     Hypertension  "associated with stage 3 chronic kidney disease due to type 2 diabetes mellitus     Obesity     PCO (posterior capsular opacification), left 3/4/2019    Proteinuria     resolved s/p transplant    S/P kidney transplant     Sleep apnea     Type 2 diabetes, uncontrolled, with retinopathy     Type II diabetes mellitus with renal manifestations          Current Outpatient Medications on File Prior to Visit   Medication Sig Dispense Refill    aspirin (ECOTRIN) 81 MG EC tablet Take 1 tablet by mouth Daily.       BD ULTRA-FINE MINI PEN NEEDLE 31 gauge x 3/16" Ndle Use to inject insulin as needed up to 4 times daily 100 each 1    bisacodyl (DULCOLAX) 5 mg EC tablet Take 5 mg by mouth daily as needed for Constipation.      blood sugar diagnostic Strp Use to test four times per day 150 strip 11    ergocalciferol (VITAMIN D2) 50,000 unit Cap Take 1 capsule by mouth every 7 days 12 capsule 3    famotidine (PEPCID) 20 MG tablet TAKE ONE TABLET BY MOUTH EVERY EVENING 30 tablet 11    finasteride (PROSCAR) 5 mg tablet Take 1 tablet (5 mg total) by mouth once daily. 30 tablet 11    fish oil-omega-3 fatty acids 300-1,000 mg capsule Take 1 g by mouth.      fish,bora,flax oils-om3,6,9no1 1,200 mg Cap Take by mouth.      fluticasone (FLONASE) 50 mcg/actuation nasal spray use 2 sprays (100 mcg total) in each nostril once daily 16 g 0    glimepiride (AMARYL) 4 MG tablet Take 1 tablet (4 mg total) by mouth before breakfast. 90 tablet 3    insulin aspart U-100 (NOVOLOG FLEXPEN U-100 INSULIN) 100 unit/mL InPn pen Inject 20 Units into the skin 3 (three) times daily with meals. 30 mL 11    insulin glargine 100 units/mL (3mL) SubQ pen Inject 30 Units into the skin 2 (two) times daily. 30 mL 11    lancets 33 gauge Misc 1 Stick by Misc.(Non-Drug; Combo Route) route as directed. Contour lancets. Use to check BG 2-3 times daily. 150 each 11    magnesium oxide (MAG-OX) 400 mg (241.3 mg magnesium) tablet Take 1 tablet (400 mg " "total) by mouth 2 (two) times daily. 60 tablet 11    metFORMIN (GLUCOPHAGE) 500 MG tablet Take 1 tablet (500 mg total) by mouth 2 (two) times daily with meals. 180 tablet 3    multivitamin (THERA) tablet Take 1 tablet by mouth.      multivitamin (THERAGRAN) tablet Take 1 tablet by mouth once daily.      multivitamin capsule Take 1 capsule by mouth once daily.      mycophenolate (CELLCEPT) 250 mg Cap TAKE THREE CAPSULES BY MOUTH TWICE A  capsule 11    omega-3 fatty acids-vitamin E (FISH OIL) 1,000 mg Cap Take 1 capsule by mouth once daily.       pen needle, diabetic (BD INSULIN PEN NEEDLE UF SHORT) 31 gauge x 5/16" Ndle USE TO INJECT INSULIN TWICE A  each 5    polyethylene glycol (GLYCOLAX) 17 gram PwPk Take 17 grams by mouth daily for 7 days. 7 packet 0    predniSONE (DELTASONE) 5 MG tablet Take 1 tablet (5 mg total) by mouth once daily. 30 tablet 11    rosuvastatin (CRESTOR) 40 MG Tab TAKE ONE TABLET BY MOUTH EVERY EVENING 30 tablet 11    sacubitril-valsartan (ENTRESTO) 24-26 mg per tablet Take 1 tablet by mouth 2 (two) times daily. 60 tablet 3    tacrolimus (PROGRAF) 1 MG Cap TAKE 5 CAPSULES BY MOUTH EVERY MORNING AND 4 CAPSULES EVERY EVENING 270 capsule 9    tamsulosin (FLOMAX) 0.4 mg Cap Take 1 capsule (0.4 mg total) by mouth once daily. 30 capsule 11    [DISCONTINUED] amLODIPine (NORVASC) 2.5 MG tablet Take 2.5 mg by mouth.      [DISCONTINUED] furosemide (LASIX) 40 MG tablet Take 1 tablet (40 mg total) by mouth daily as needed. 30 tablet 11    [DISCONTINUED] HUMALOG KWIKPEN 100 unit/mL InPn pen INJECT 15 UNITS SUBCUTANEOUSLY 10 TO 15 MINUTES BEFORE MEALS 15 Syringe 5    [DISCONTINUED] ketoconazole (NIZORAL) 200 mg Tab Take 1 tablet by mouth daily for 14 days. 14 tablet 0    [DISCONTINUED] metoprolol succinate (TOPROL-XL) 25 MG 24 hr tablet TAKE TWO TABLETS BY MOUTH ONCE DAILY 60 tablet 11     No current facility-administered medications on file prior to visit.          Review of " Systems   Constitutional: Negative for activity change and appetite change.   HENT: Negative for congestion and facial swelling.    Eyes: Negative for pain, discharge and redness.   Respiratory: Negative for apnea, cough and chest tightness.    Cardiovascular: Negative for chest pain, palpitations and leg swelling.   Gastrointestinal: Negative for abdominal distention.   Genitourinary: Negative for difficulty urinating, dysuria and frequency.   Musculoskeletal: Positive for arthralgias. Negative for neck pain and neck stiffness.   Skin: Negative for color change, rash and wound.   Neurological: Negative for dizziness, weakness and numbness.   Psychiatric/Behavioral: Negative for sleep disturbance.   All other systems reviewed and are negative.    :            Objective:         Vitals:    03/25/19 1620   BP: 98/62   Pulse: 96       Weight 287 lb      Physical Exam   Constitutional: He is oriented to person, place, and time. He appears well-developed and well-nourished. No distress.   HENT:   Head: Normocephalic and atraumatic.   Eyes: Pupils are equal, round, and reactive to light.   Neck: Normal range of motion. Neck supple. No tracheal deviation present. No thyromegaly present.   Cardiovascular: Normal rate, regular rhythm, normal heart sounds and intact distal pulses. Exam reveals no gallop and no friction rub.   No murmur heard.  Pulmonary/Chest: Effort normal and breath sounds normal. He has no wheezes. He has no rales.   Abdominal: Soft. He exhibits no mass. There is no tenderness. There is no rebound and no guarding.   Obese abdomen, no allograft tenderness right lower quadrant,   Musculoskeletal: Normal range of motion. He exhibits no edema.   Lymphadenopathy:     He has no cervical adenopathy.   Neurological: He is alert and oriented to person, place, and time.   Skin: Skin is warm. No rash noted. He is not diaphoretic. No erythema.   Nursing note and vitals reviewed.    :              Labs:    Lab Results    Component Value Date    CREATININE 1.7 (H) 03/21/2019    BUN 27 (H) 03/21/2019     (H) 03/21/2019    K 3.6 03/21/2019     03/21/2019    CO2 30 (H) 03/21/2019       Lab Results   Component Value Date    WBC 6.79 03/21/2019    HGB 14.6 03/21/2019    HCT 50.6 03/21/2019    MCV 87 03/21/2019     03/21/2019       Lab Results   Component Value Date    .0 (H) 03/21/2019    CALCIUM 9.6 03/21/2019    PHOS 2.8 03/21/2019       Lab Results   Component Value Date    ALBUMIN 3.7 03/21/2019       Lab Results   Component Value Date    URICACID 5.7 07/09/2015       Lab Results   Component Value Date    HGBA1C 6.8 (H) 12/28/2018       Impression and Plan :  63-year-old  gentleman seen in office today in follow-up for following medical problems     1. S/p LRRT ( 6/2016, Dtr ) , end-stage renal disease due to history of hypertension and diabetes, stable renal function with a serum creatinine of 1.4 mg/dL, currently on Prograf 5/4, CellCept 750 mg twice a day, prednisone 5 mg daily, ketoconazole 100 mg daily,     Will check Prograf level , target Prograf level 4-7,     2.  Hypertension - blood pressure is on the low side, advised patient to reduce Toprol-XL from 50 mg daily to 25 mg daily,    Currently also on Entresto twice a day ,     3.  Volume -  discussed salt and fluid restriction, recent labs revealed hypernatremia of 146, will reduce furosemide from 40 mg twice a day to daily, also should help his low blood pressure,     4.  Anemia of chronic kidney disease - stable hemoglobin at 14.6 g     5.  Secondary hyperparathyroidism - follow PTH, on vitamin-D supplements,     6.  Type 2 diabetes - discussed adherence with diet and medications, discussed weight loss,     7.  Morbid obesity - discussed calorie restriction     8.  Hypomagnesemia - monitor magnesium levels, currently is not taking magnesium supplements,     9. BPH - on meds      Return to clinic in 8 weeks, more than 40 min of  face-to-face time was spent with the patient discussing labs and plan of care, also time was spent counseling patient.     Berlin Pacheco MD

## 2019-03-25 NOTE — PATIENT INSTRUCTIONS
Avoid NSAID pain medications such as advil, aleve, motrin, ibuprofen, naprosyn, meloxicam, diclofenac, mobic.     Cont fluid restriction about 30-40 oz a day     Low salt diet     1) goal blood pressure is less than 130/80    2) please bring BP machine at next appt    3) keep a track of daily BPs and bring at next appt    4) bring all meds in a bag at next appt

## 2019-03-27 ENCOUNTER — LAB VISIT (OUTPATIENT)
Dept: LAB | Facility: HOSPITAL | Age: 64
End: 2019-03-27
Attending: INTERNAL MEDICINE
Payer: COMMERCIAL

## 2019-03-27 DIAGNOSIS — Z94.0 S/P KIDNEY TRANSPLANT: ICD-10-CM

## 2019-03-27 LAB
ALBUMIN SERPL BCP-MCNC: 3.6 G/DL (ref 3.5–5.2)
ANION GAP SERPL CALC-SCNC: 10 MMOL/L (ref 8–16)
BUN SERPL-MCNC: 25 MG/DL (ref 8–23)
CALCIUM SERPL-MCNC: 9.6 MG/DL (ref 8.7–10.5)
CHLORIDE SERPL-SCNC: 105 MMOL/L (ref 95–110)
CO2 SERPL-SCNC: 27 MMOL/L (ref 23–29)
CREAT SERPL-MCNC: 1.6 MG/DL (ref 0.5–1.4)
EST. GFR  (AFRICAN AMERICAN): 51.5 ML/MIN/1.73 M^2
EST. GFR  (NON AFRICAN AMERICAN): 44.5 ML/MIN/1.73 M^2
GLUCOSE SERPL-MCNC: 140 MG/DL (ref 70–110)
MAGNESIUM SERPL-MCNC: 1.8 MG/DL (ref 1.6–2.6)
PHOSPHATE SERPL-MCNC: 2.3 MG/DL (ref 2.7–4.5)
POTASSIUM SERPL-SCNC: 3.8 MMOL/L (ref 3.5–5.1)
SODIUM SERPL-SCNC: 142 MMOL/L (ref 136–145)

## 2019-03-27 PROCEDURE — 80069 RENAL FUNCTION PANEL: CPT

## 2019-03-27 PROCEDURE — 83735 ASSAY OF MAGNESIUM: CPT

## 2019-03-27 PROCEDURE — 80197 ASSAY OF TACROLIMUS: CPT

## 2019-03-27 PROCEDURE — 36415 COLL VENOUS BLD VENIPUNCTURE: CPT | Mod: PO

## 2019-03-28 ENCOUNTER — DOCUMENTATION ONLY (OUTPATIENT)
Dept: NEPHROLOGY | Facility: CLINIC | Age: 64
End: 2019-03-28

## 2019-03-28 DIAGNOSIS — Z94.0 KIDNEY REPLACED BY TRANSPLANT: ICD-10-CM

## 2019-03-28 LAB — TACROLIMUS BLD-MCNC: 9.8 NG/ML (ref 5–15)

## 2019-03-28 RX ORDER — TACROLIMUS 1 MG/1
4 CAPSULE ORAL EVERY 12 HOURS
Qty: 270 CAPSULE | Refills: 9
Start: 2019-03-28 | End: 2020-02-18 | Stop reason: SDUPTHER

## 2019-03-28 NOTE — PROGRESS NOTES
Informed patient of Prograf level. Informed patient to take 4 mg of Prograf BID. Patient verbalized understanding. Prograf level scheduled on 4/4/19.

## 2019-03-28 NOTE — PROGRESS NOTES
Prograf level is  elevated at 9.6, target Prograf level is 4-7, please advise patient to reduce Prograf from 5 mg in a.m. and 4 mg in p.m. to 4 mg twice a day, repeat Prograf level in a week,    Dr Pacheco

## 2019-03-29 ENCOUNTER — OFFICE VISIT (OUTPATIENT)
Dept: OPHTHALMOLOGY | Facility: CLINIC | Age: 64
End: 2019-03-29
Payer: COMMERCIAL

## 2019-03-29 DIAGNOSIS — Z98.890 POST-OPERATIVE STATE: Primary | ICD-10-CM

## 2019-03-29 PROCEDURE — 99999 PR PBB SHADOW E&M-EST. PATIENT-LVL II: CPT | Mod: PBBFAC,,, | Performed by: OPHTHALMOLOGY

## 2019-03-29 PROCEDURE — 99024 PR POST-OP FOLLOW-UP VISIT: ICD-10-PCS | Mod: S$GLB,,, | Performed by: OPHTHALMOLOGY

## 2019-03-29 PROCEDURE — 99024 POSTOP FOLLOW-UP VISIT: CPT | Mod: S$GLB,,, | Performed by: OPHTHALMOLOGY

## 2019-03-29 PROCEDURE — 99999 PR PBB SHADOW E&M-EST. PATIENT-LVL II: ICD-10-PCS | Mod: PBBFAC,,, | Performed by: OPHTHALMOLOGY

## 2019-03-29 NOTE — PROGRESS NOTES
l    ===============================  03/29/2019   Mitch Whittaker,   63 y.o. male   Last visit [facility]: :3/21/2019   Last visit eye dept. 3/21/2019  [facility]:  Uncorrected distance visual acuity was 20/20 -2 in the right eye and 20/20 -1 in the left eye.  Tonometry     Tonometry (Applanation, 9:43 AM)       Right Left    Pressure 17 17               Not recorded         Not recorded        Chief Complaint   Patient presents with    Post-op Evaluation     s/p YAG OS 3/21/19. pt states he still see black lines in his vision, comes and goes. not any worse from before. vision is a little better in the left eye.        HPI     Post-op Evaluation      Additional comments: s/p YAG OS 3/21/19. pt states he still see black   lines in his vision, comes and goes. not any worse from before. vision is   a little better in the left eye.              Comments     DM since approx 1996  PDR S/P PRP OU  Old Focal OU  Old OS TRD  ERM OU  PCIOL OU w/YAG OS 3/21/19  KIDNEY TRANSPLANT 6/12/15          Last edited by Neto Donovan on 3/29/2019  9:38 AM. (History)          ________________  3/29/2019  Problem List Items Addressed This Visit     None      Visit Diagnoses     Post-operative state    -  Primary         DM since 1996  H/o PRP and Focal OU  OS TRD  H/O Kidney transplant 6/12/15  A1C 10.2 2/17       Better after yag o s   rtc 6 months                .       ===========================

## 2019-04-04 ENCOUNTER — LAB VISIT (OUTPATIENT)
Dept: LAB | Facility: HOSPITAL | Age: 64
End: 2019-04-04
Attending: UROLOGY
Payer: COMMERCIAL

## 2019-04-04 DIAGNOSIS — Z94.0 KIDNEY REPLACED BY TRANSPLANT: ICD-10-CM

## 2019-04-04 DIAGNOSIS — N40.0 BENIGN PROSTATIC HYPERPLASIA, UNSPECIFIED WHETHER LOWER URINARY TRACT SYMPTOMS PRESENT: ICD-10-CM

## 2019-04-04 DIAGNOSIS — Z12.5 PROSTATE CANCER SCREENING: ICD-10-CM

## 2019-04-04 LAB — COMPLEXED PSA SERPL-MCNC: 0.31 NG/ML (ref 0–4)

## 2019-04-04 PROCEDURE — 36415 COLL VENOUS BLD VENIPUNCTURE: CPT | Mod: PO

## 2019-04-04 PROCEDURE — 84153 ASSAY OF PSA TOTAL: CPT

## 2019-04-04 PROCEDURE — 80197 ASSAY OF TACROLIMUS: CPT

## 2019-04-05 LAB — TACROLIMUS BLD-MCNC: 7.2 NG/ML (ref 5–15)

## 2019-04-18 ENCOUNTER — OFFICE VISIT (OUTPATIENT)
Dept: PULMONOLOGY | Facility: CLINIC | Age: 64
End: 2019-04-18
Payer: COMMERCIAL

## 2019-04-18 VITALS
OXYGEN SATURATION: 96 % | WEIGHT: 272.69 LBS | BODY MASS INDEX: 41.33 KG/M2 | HEART RATE: 117 BPM | RESPIRATION RATE: 20 BRPM | SYSTOLIC BLOOD PRESSURE: 96 MMHG | DIASTOLIC BLOOD PRESSURE: 60 MMHG | HEIGHT: 68 IN

## 2019-04-18 DIAGNOSIS — G47.33 OSA (OBSTRUCTIVE SLEEP APNEA): Primary | ICD-10-CM

## 2019-04-18 DIAGNOSIS — E66.01 CLASS 3 SEVERE OBESITY DUE TO EXCESS CALORIES WITH SERIOUS COMORBIDITY AND BODY MASS INDEX (BMI) OF 40.0 TO 44.9 IN ADULT: ICD-10-CM

## 2019-04-18 DIAGNOSIS — I50.9 CONGESTIVE HEART FAILURE, UNSPECIFIED HF CHRONICITY, UNSPECIFIED HEART FAILURE TYPE: ICD-10-CM

## 2019-04-18 DIAGNOSIS — G47.33 OBSTRUCTIVE SLEEP APNEA SYNDROME: ICD-10-CM

## 2019-04-18 DIAGNOSIS — I50.42 CHRONIC COMBINED SYSTOLIC AND DIASTOLIC CONGESTIVE HEART FAILURE: ICD-10-CM

## 2019-04-18 PROCEDURE — 3074F SYST BP LT 130 MM HG: CPT | Mod: CPTII,S$GLB,, | Performed by: INTERNAL MEDICINE

## 2019-04-18 PROCEDURE — 3078F DIAST BP <80 MM HG: CPT | Mod: CPTII,S$GLB,, | Performed by: INTERNAL MEDICINE

## 2019-04-18 PROCEDURE — 99999 PR PBB SHADOW E&M-EST. PATIENT-LVL IV: ICD-10-PCS | Mod: PBBFAC,,, | Performed by: INTERNAL MEDICINE

## 2019-04-18 PROCEDURE — 3008F PR BODY MASS INDEX (BMI) DOCUMENTED: ICD-10-PCS | Mod: CPTII,S$GLB,, | Performed by: INTERNAL MEDICINE

## 2019-04-18 PROCEDURE — 99999 PR PBB SHADOW E&M-EST. PATIENT-LVL IV: CPT | Mod: PBBFAC,,, | Performed by: INTERNAL MEDICINE

## 2019-04-18 PROCEDURE — 1101F PT FALLS ASSESS-DOCD LE1/YR: CPT | Mod: CPTII,S$GLB,, | Performed by: INTERNAL MEDICINE

## 2019-04-18 PROCEDURE — 99205 OFFICE O/P NEW HI 60 MIN: CPT | Mod: S$GLB,,, | Performed by: INTERNAL MEDICINE

## 2019-04-18 PROCEDURE — 3078F PR MOST RECENT DIASTOLIC BLOOD PRESSURE < 80 MM HG: ICD-10-PCS | Mod: CPTII,S$GLB,, | Performed by: INTERNAL MEDICINE

## 2019-04-18 PROCEDURE — 1101F PR PT FALLS ASSESS DOC 0-1 FALLS W/OUT INJ PAST YR: ICD-10-PCS | Mod: CPTII,S$GLB,, | Performed by: INTERNAL MEDICINE

## 2019-04-18 PROCEDURE — 3008F BODY MASS INDEX DOCD: CPT | Mod: CPTII,S$GLB,, | Performed by: INTERNAL MEDICINE

## 2019-04-18 PROCEDURE — 99205 PR OFFICE/OUTPT VISIT, NEW, LEVL V, 60-74 MIN: ICD-10-PCS | Mod: S$GLB,,, | Performed by: INTERNAL MEDICINE

## 2019-04-18 PROCEDURE — 3074F PR MOST RECENT SYSTOLIC BLOOD PRESSURE < 130 MM HG: ICD-10-PCS | Mod: CPTII,S$GLB,, | Performed by: INTERNAL MEDICINE
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
## 2019-04-26 RX ORDER — TAMSULOSIN HYDROCHLORIDE 0.4 MG/1
1 CAPSULE ORAL DAILY
Qty: 30 CAPSULE | Refills: 11 | Status: CANCELLED | OUTPATIENT
Start: 2019-04-26

## 2019-04-26 RX ORDER — FAMOTIDINE 20 MG/1
TABLET, FILM COATED ORAL
Qty: 30 TABLET | Refills: 11 | Status: SHIPPED | OUTPATIENT
Start: 2019-04-26

## 2019-04-28 RX ORDER — FUROSEMIDE 80 MG/1
TABLET ORAL
Qty: 30 TABLET | Refills: 11 | Status: SHIPPED | OUTPATIENT
Start: 2019-04-28 | End: 2019-06-04 | Stop reason: SDUPTHER

## 2019-04-28 RX ORDER — TAMSULOSIN HYDROCHLORIDE 0.4 MG/1
1 CAPSULE ORAL DAILY
Qty: 30 CAPSULE | Refills: 11 | Status: SHIPPED | OUTPATIENT
Start: 2019-04-28 | End: 2020-04-10 | Stop reason: SDUPTHER

## 2019-05-02 RX ORDER — ROSUVASTATIN CALCIUM 40 MG/1
TABLET, COATED ORAL
Qty: 30 TABLET | Refills: 11 | Status: SHIPPED | OUTPATIENT
Start: 2019-05-02 | End: 2020-04-10 | Stop reason: SDUPTHER

## 2019-05-08 RX ORDER — INSULIN ASPART 100 [IU]/ML
25 INJECTION, SOLUTION INTRAVENOUS; SUBCUTANEOUS
Qty: 30 ML | Refills: 11 | Status: SHIPPED | OUTPATIENT
Start: 2019-05-08 | End: 2020-05-20 | Stop reason: SDUPTHER

## 2019-05-13 ENCOUNTER — OFFICE VISIT (OUTPATIENT)
Dept: CARDIOLOGY | Facility: CLINIC | Age: 64
End: 2019-05-13
Payer: COMMERCIAL

## 2019-05-13 VITALS
HEART RATE: 95 BPM | WEIGHT: 273.81 LBS | HEIGHT: 68 IN | DIASTOLIC BLOOD PRESSURE: 68 MMHG | SYSTOLIC BLOOD PRESSURE: 118 MMHG | BODY MASS INDEX: 41.5 KG/M2

## 2019-05-13 DIAGNOSIS — G47.33 OBSTRUCTIVE SLEEP APNEA SYNDROME: ICD-10-CM

## 2019-05-13 DIAGNOSIS — Z94.0 KIDNEY TRANSPLANT STATUS, LIVING RELATED DONOR: Chronic | ICD-10-CM

## 2019-05-13 DIAGNOSIS — I12.9 HYPERTENSION ASSOCIATED WITH STAGE 3 CHRONIC KIDNEY DISEASE DUE TO TYPE 2 DIABETES MELLITUS: ICD-10-CM

## 2019-05-13 DIAGNOSIS — N18.30 CKD (CHRONIC KIDNEY DISEASE) STAGE 3, GFR 30-59 ML/MIN: ICD-10-CM

## 2019-05-13 DIAGNOSIS — I25.10 CORONARY ARTERY DISEASE INVOLVING NATIVE CORONARY ARTERY OF NATIVE HEART WITHOUT ANGINA PECTORIS: ICD-10-CM

## 2019-05-13 DIAGNOSIS — I50.42 CHRONIC COMBINED SYSTOLIC AND DIASTOLIC CONGESTIVE HEART FAILURE: Primary | ICD-10-CM

## 2019-05-13 DIAGNOSIS — E11.8 TYPE 2 DIABETES MELLITUS WITH COMPLICATION, WITH LONG-TERM CURRENT USE OF INSULIN: ICD-10-CM

## 2019-05-13 DIAGNOSIS — E11.22 HYPERTENSION ASSOCIATED WITH STAGE 3 CHRONIC KIDNEY DISEASE DUE TO TYPE 2 DIABETES MELLITUS: ICD-10-CM

## 2019-05-13 DIAGNOSIS — Z79.4 TYPE 2 DIABETES MELLITUS WITH COMPLICATION, WITH LONG-TERM CURRENT USE OF INSULIN: ICD-10-CM

## 2019-05-13 DIAGNOSIS — N18.30 HYPERTENSION ASSOCIATED WITH STAGE 3 CHRONIC KIDNEY DISEASE DUE TO TYPE 2 DIABETES MELLITUS: ICD-10-CM

## 2019-05-13 PROCEDURE — 1101F PT FALLS ASSESS-DOCD LE1/YR: CPT | Mod: CPTII,S$GLB,, | Performed by: INTERNAL MEDICINE

## 2019-05-13 PROCEDURE — 3078F DIAST BP <80 MM HG: CPT | Mod: CPTII,S$GLB,, | Performed by: INTERNAL MEDICINE

## 2019-05-13 PROCEDURE — 1101F PR PT FALLS ASSESS DOC 0-1 FALLS W/OUT INJ PAST YR: ICD-10-PCS | Mod: CPTII,S$GLB,, | Performed by: INTERNAL MEDICINE

## 2019-05-13 PROCEDURE — 3044F PR MOST RECENT HEMOGLOBIN A1C LEVEL <7.0%: ICD-10-PCS | Mod: CPTII,S$GLB,, | Performed by: INTERNAL MEDICINE

## 2019-05-13 PROCEDURE — 3044F HG A1C LEVEL LT 7.0%: CPT | Mod: CPTII,S$GLB,, | Performed by: INTERNAL MEDICINE

## 2019-05-13 PROCEDURE — 99214 PR OFFICE/OUTPT VISIT, EST, LEVL IV, 30-39 MIN: ICD-10-PCS | Mod: S$GLB,,, | Performed by: INTERNAL MEDICINE

## 2019-05-13 PROCEDURE — 99214 OFFICE O/P EST MOD 30 MIN: CPT | Mod: S$GLB,,, | Performed by: INTERNAL MEDICINE

## 2019-05-13 PROCEDURE — 3008F PR BODY MASS INDEX (BMI) DOCUMENTED: ICD-10-PCS | Mod: CPTII,S$GLB,, | Performed by: INTERNAL MEDICINE

## 2019-05-13 PROCEDURE — 3008F BODY MASS INDEX DOCD: CPT | Mod: CPTII,S$GLB,, | Performed by: INTERNAL MEDICINE

## 2019-05-13 PROCEDURE — 3074F SYST BP LT 130 MM HG: CPT | Mod: CPTII,S$GLB,, | Performed by: INTERNAL MEDICINE

## 2019-05-13 PROCEDURE — 3074F PR MOST RECENT SYSTOLIC BLOOD PRESSURE < 130 MM HG: ICD-10-PCS | Mod: CPTII,S$GLB,, | Performed by: INTERNAL MEDICINE

## 2019-05-13 PROCEDURE — 99999 PR PBB SHADOW E&M-EST. PATIENT-LVL III: ICD-10-PCS | Mod: PBBFAC,,, | Performed by: INTERNAL MEDICINE

## 2019-05-13 PROCEDURE — 99999 PR PBB SHADOW E&M-EST. PATIENT-LVL III: CPT | Mod: PBBFAC,,, | Performed by: INTERNAL MEDICINE

## 2019-05-13 PROCEDURE — 3078F PR MOST RECENT DIASTOLIC BLOOD PRESSURE < 80 MM HG: ICD-10-PCS | Mod: CPTII,S$GLB,, | Performed by: INTERNAL MEDICINE

## 2019-05-13 NOTE — PROGRESS NOTES
Subjective:   Patient ID:  Mitch Whittaker is a 63 y.o. male who presents for cardiac consult of 4 wk follow up and Congestive Heart Failure      HPI  The patient came in today for cardiac consult of 4 wk follow up and Congestive Heart Failure      Mitch Whittaker is a 63 y.o. male with current medical conditions non obs CAD, HFrEF 25-30%, CKD, DM, MARION, HTN, HLD, ESRD s/p renal transplant presents for follow up CV eval.     3/15/19  Pt of Dr. Morris, last seen in clinic 2016. Pt presents after recent admission for CHF at [facility] -   Patient is a 63-year-old gentleman who presented to the hospital with shortness of breath. He was found to have pulmonary edema on chest x-ray and bilateral pedal edema. He was admitted for treatment of acute CHF. Echo showed reduced ejection fraction at 25-30%. He was treated with IV Lasix 80 mg twice daily that has been transitioned to Lasix p.o. Patient is not euvolemic and his creatinine is trending up for this reason I am cutting back on Lasix to 40 mg twice daily. Can follow-up with cardiology in 3 days. He sees one [facility]. He was seen by Dr. Omayra Esparza from [facility]. Recommendation is to start him on Entresto once his renal functions stabilize.Pt had edema and SOB while in bed and then went to hospital. Prior to admission he was on lasix PRN. He will see Dr. Pacheco for nephro eval, transplant nephro Dr. Dejesus.  Has been feeling good on lasix 40 BID, has been walking well overall. Does not have CPAP machine.     5/13/19  He was started on Entresto after last visit as renal function improved/stabilized. Has seen Dr. Mason recently started on CPAP again. Toprol dec to 25 mg due to hypotension. Discussed will repeat echo in 1 month to evaluate LV function, will refer for ICD if EF remains low. He woke up with right sided back pain. He has been using CPAP for about a little over a week. Has been doing well lately overall, BP well controlled, no STEELE/LE lately. PT has  mild forgetfulness but family at home has been taking care of him closely.     Patient feels no chest pain, no sob, no leg swelling, no PND, no palpitation, no dizziness, no syncope, no CNS symptoms.    Patient has dec exercise tolerance.    Patient is compliant with medications.    3/10/19 ECHO  Normal left ventricle cavity size. Severely (25-30%) decreased ejection   fraction. Mild concentric hypertrophy observed. Left ventricular diastolic   dysfunction.      Nuclear Stress  Nuclear Quantitative Functional Analysis:   LVEF: 46 %  LVED Volume: 144 ml  LVES Volume: 91 ml    Impression: NORMAL MYOCARDIAL PERFUSION  1. The perfusion scan is free of evidence for myocardial ischemia or injury.   2. Resting wall motion is physiologic.   3. There is resting LV dysfunction with a reduced ejection fraction of 46 %.   4. The ventricular volumes are normal at rest and stress.   5. The extracardiac distribution of radioactivity is normal.     This document has been electronically    SIGNED BY: Migel Morris MD On: 06/20/2018 15:11  Past Medical History:   Diagnosis Date    Acquired renal cyst of left kidney     Anemia associated with chronic renal failure     CAD (coronary artery disease)     nonobstructive [facility] 9/14    CHF (congestive heart failure)     Chronic immunosuppression with Prograf and MMF 6/18/2015    CKD (chronic kidney disease) stage 3, GFR 30-59 ml/min     Diabetic retinopathy     DM (diabetes mellitus), type 2 with complications 1994    Edema     End stage kidney disease     s/p transplant, doing well    Gallbladder polyp     Heart failure, diastolic, due to HTN     Hemodialysis status     off since transplant    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     History of colon polyps     HPTH (hyperparathyroidism)     Hyperlipidemia     Hypertension associated with stage 3 chronic kidney disease due to type 2 diabetes mellitus     Obesity     PCO (posterior  "capsular opacification), left 3/4/2019    Proteinuria     resolved s/p transplant    S/P kidney transplant     Sleep apnea     Type 2 diabetes, uncontrolled, with retinopathy     Type II diabetes mellitus with renal manifestations        Past Surgical History:   Procedure Laterality Date    CARDIAC CATHETERIZATION  2008    normal coronary    COLONOSCOPY N/A 4/5/2018    Performed by Chava Ronquillo MD at [facility] ENDO    Colonoscopy N/A 11/12/2014    Performed by José Luis Kevin MD at [facility] ENDO    HEART CATH-LEFT Left 9/29/2014    Performed by Migel Morris MD at [facility] CATH LAB    INSERTION, CATHETER, VASCULAR Right 7/9/2013    Performed by Javy Vang MD at [facility] CATH LAB    INSERTION, GRAFT, ARTERIOVENOUS Right 7/29/2013    Performed by Grover Cesar MD at [facility] OR    KIDNEY TRANSPLANT      RETINAL LASER PROCEDURE      TRANSPLANT-KIDNEY N/A 6/12/2015    Performed by Carlota Wilson MD at [facility] OR [address] [place] [address]       Social History     Tobacco Use    Smoking status: Never Smoker    Smokeless tobacco: Former User    Tobacco comment: used marijuana since [id number], stopped after started dialysis   Substance Use Topics    Alcohol use: No     Alcohol/week: 0.0 oz    Drug use: No     Comment:         Family History   Problem Relation Age of Onset    Diabetes Mother     Hypertension Mother     Heart failure Mother     Kidney disease Sister         ESRD    Diabetes Sister     Diabetes Maternal Grandmother     Cancer Neg Hx        Patient's Medications   New Prescriptions    No medications on file   Previous Medications    ASPIRIN (ECOTRIN) 81 MG EC TABLET    Take 1 tablet by mouth Daily.     BD ULTRA-FINE MINI PEN NEEDLE 31 GAUGE X 3/16" NDLE    Use to inject insulin as needed up to 4 times daily    BISACODYL (DULCOLAX) 5 MG EC TABLET    Take 5 mg by mouth daily as needed for Constipation.    BLOOD SUGAR DIAGNOSTIC STRP    Use to test four times per day    ERGOCALCIFEROL (VITAMIN D2) 50,000 UNIT " CAP    Take 1 capsule by mouth every 7 days    FAMOTIDINE (PEPCID) 20 MG TABLET    TAKE ONE TABLET BY MOUTH EVERY EVENING    FINASTERIDE (PROSCAR) 5 MG TABLET    Take 1 tablet (5 mg total) by mouth once daily.    FISH OIL-OMEGA-3 FATTY ACIDS 300-1,000 MG CAPSULE    Take 1 g by mouth.    FISH,BORA,FLAX OILS-OM3,6,9NO1 1,200 MG CAP    Take by mouth.    FLUTICASONE (FLONASE) 50 MCG/ACTUATION NASAL SPRAY    use 2 sprays (100 mcg total) in each nostril once daily    FUROSEMIDE (LASIX) 40 MG TABLET    Take 1 tablet (40 mg total) by mouth once daily.    FUROSEMIDE (LASIX) 80 MG TABLET    Take 0.5 tablets (40 mg) by mouth 2 (two) times daily.    GLIMEPIRIDE (AMARYL) 4 MG TABLET    Take 1 tablet (4 mg total) by mouth before breakfast.    INSULIN ASPART U-100 (NOVOLOG FLEXPEN U-100 INSULIN) 100 UNIT/ML (3 ML) INPN PEN    Inject 25 Units into the skin 3 (three) times daily with meals.    INSULIN ASPART U-100 (NOVOLOG FLEXPEN U-100 INSULIN) 100 UNIT/ML INPN PEN    Inject 20 Units into the skin 3 (three) times daily with meals.    INSULIN GLARGINE 100 UNITS/ML (3ML) SUBQ PEN    Inject 30 Units into the skin 2 (two) times daily.    KETOCONAZOLE (NIZORAL) 200 MG TAB    Take 0.5 tablets (100 mg total) by mouth once daily.    LANCETS 33 GAUGE MISC    1 Stick by Misc.(Non-Drug; Combo Route) route as directed. Contour lancets. Use to check BG 2-3 times daily.    METFORMIN (GLUCOPHAGE) 500 MG TABLET    Take 1 tablet (500 mg total) by mouth 2 (two) times daily with meals.    METOPROLOL SUCCINATE (TOPROL-XL) 25 MG 24 HR TABLET    Take 1 tablet (25 mg total) by mouth once daily.    MULTIVITAMIN (THERA) TABLET    Take 1 tablet by mouth.    MULTIVITAMIN (THERAGRAN) TABLET    Take 1 tablet by mouth once daily.    MULTIVITAMIN CAPSULE    Take 1 capsule by mouth once daily.    MYCOPHENOLATE (CELLCEPT) 250 MG CAP    TAKE THREE CAPSULES BY MOUTH TWICE A DAY    OMEGA-3 FATTY ACIDS-VITAMIN E (FISH OIL) 1,000 MG CAP    Take 1 capsule by mouth once  "daily.     PEN NEEDLE, DIABETIC (BD INSULIN PEN NEEDLE UF SHORT) 31 GAUGE X 5/16" NDLE    USE TO INJECT INSULIN TWICE A DAY    POLYETHYLENE GLYCOL (GLYCOLAX) 17 GRAM PWPK    Take 17 grams by mouth daily for 7 days.    PREDNISONE (DELTASONE) 5 MG TABLET    Take 1 tablet (5 mg total) by mouth once daily.    ROSUVASTATIN (CRESTOR) 40 MG TAB    TAKE ONE TABLET BY MOUTH EVERY EVENING    SACUBITRIL-VALSARTAN (ENTRESTO) 24-26 MG PER TABLET    Take 1 tablet by mouth 2 (two) times daily.    TACROLIMUS (PROGRAF) 1 MG CAP    Take 4 capsules (4 mg total) by mouth every 12 (twelve) hours.    TAMSULOSIN (FLOMAX) 0.4 MG CAP    Take 1 capsule (0.4 mg total) by mouth once daily.   Modified Medications    No medications on file   Discontinued Medications    No medications on file       Review of Systems   Constitutional: Negative.    HENT: Negative.    Eyes: Negative.    Respiratory: Negative.    Cardiovascular: Negative.  Negative for chest pain and palpitations.   Gastrointestinal: Negative.    Genitourinary: Negative.    Musculoskeletal: Positive for back pain.   Skin: Negative.    Neurological: Negative.    Endo/Heme/Allergies: Negative.    Psychiatric/Behavioral: Negative.    All 12 systems otherwise negative.      Wt Readings from Last 3 Encounters:   05/13/19 124.2 kg (273 lb 13 oz)   04/18/19 123.7 kg (272 lb 11.3 oz)   03/19/19 124 kg (273 lb 5.9 oz)     Temp Readings from Last 3 Encounters:   03/19/19 97.6 °F (36.4 °C) (Tympanic)   01/04/19 98.2 °F (36.8 °C) (Tympanic)   12/04/18 98 °F (36.7 °C) (Oral)     BP Readings from Last 3 Encounters:   05/13/19 118/68   04/18/19 96/60   03/25/19 98/62     Pulse Readings from Last 3 Encounters:   05/13/19 95   04/18/19 (!) 117   03/25/19 96       /68 (BP Method: Small (Manual))   Pulse 95   Ht 5' 8" (1.727 m)   Wt 124.2 kg (273 lb 13 oz)   BMI 41.63 kg/m²     Objective:   Physical Exam   Constitutional: He is oriented to person, place, and time. He appears well-developed " and well-nourished. No distress.   HENT:   Head: Normocephalic and atraumatic.   Nose: Nose normal.   Mouth/Throat: Oropharynx is clear and moist.   Eyes: Conjunctivae and EOM are normal. No scleral icterus.   Neck: Normal range of motion. Neck supple. No JVD present. No thyromegaly present.   Cardiovascular: Normal rate, regular rhythm, S1 normal and S2 normal. Exam reveals no gallop, no S3, no S4 and no friction rub.   No murmur heard.  Pulmonary/Chest: Effort normal and breath sounds normal. No stridor. No respiratory distress. He has no wheezes. He has no rales. He exhibits no tenderness.   Abdominal: Soft. Bowel sounds are normal. He exhibits no distension and no mass. There is no tenderness. There is no rebound.   Genitourinary:   Genitourinary Comments: Deferred   Musculoskeletal: Normal range of motion. He exhibits no edema, tenderness or deformity.   Lymphadenopathy:     He has no cervical adenopathy.   Neurological: He is alert and oriented to person, place, and time. He exhibits normal muscle tone. Coordination normal.   Skin: Skin is warm and dry. No rash noted. He is not diaphoretic. No erythema. No pallor.   Psychiatric: He has a normal mood and affect. His behavior is normal. Judgment and thought content normal.   Nursing note and vitals reviewed.      Lab Results   Component Value Date     03/27/2019    K 3.8 03/27/2019     03/27/2019    CO2 27 03/27/2019    BUN 25 (H) 03/27/2019    CREATININE 1.6 (H) 03/27/2019     (H) 03/27/2019    HGBA1C 6.8 (H) 12/28/2018    MG 1.8 03/27/2019    AST 16 12/28/2018    ALT 18 12/28/2018    ALBUMIN 3.6 03/27/2019    PROT 6.7 12/28/2018    BILITOT 0.6 12/28/2018    WBC 6.79 03/21/2019    HGB 14.6 03/21/2019    HCT 50.6 03/21/2019    HCT 36 06/12/2015    MCV 87 03/21/2019     03/21/2019    INR 1.0 06/18/2015    TSH 0.771 12/28/2018    CHOL 142 12/28/2018    HDL 49 12/28/2018    LDLCALC 75.6 12/28/2018    TRIG 87 12/28/2018     (H)  12/04/2018     Assessment:      1. Chronic combined systolic and diastolic congestive heart failure    2. Coronary artery disease involving native coronary artery of native heart without angina pectoris    3. Hypertension associated with stage 3 chronic kidney disease due to type 2 diabetes mellitus    4. Living-related donor kidney transplant (daughter) - 6/12/15    5. CKD (chronic kidney disease) stage 3, GFR 30-59 ml/min    6. Type 2 diabetes mellitus with complication, with long-term current use of insulin    7. Obstructive sleep apnea syndrome        Plan:   1. Chronic CHF exac EF 25-30%  - cont lasix, and extra PRN, pt euvolemic   - cont Toprol and Entresto  - will refer to advanced CHF if further exac  - cont low salt diet, fluid restriction  - will need ICD eval discussed will repeat echo in 1 month    2. HTN   - cont meds for afterload reduction   - low salt diet    3. HLD  - cont statin    4. ESRD s/p Transplant with CKD  - f/u with nephro    5. CAD, non obs  - cont meds  - stress 6/2018 negative    6. MARION  -cont CPAP    7. Obesity  -rec weight loss with diet/exercise     Thank you for allowing me to participate in this patient's care. Please do not hesitate to contact me with any questions or concerns. Consult note has been forwarded to the referral physician.

## 2019-05-15 ENCOUNTER — LAB VISIT (OUTPATIENT)
Dept: LAB | Facility: HOSPITAL | Age: 64
End: 2019-05-15
Attending: INTERNAL MEDICINE
Payer: COMMERCIAL

## 2019-05-15 DIAGNOSIS — Z94.0 S/P KIDNEY TRANSPLANT: ICD-10-CM

## 2019-05-15 LAB
25(OH)D3+25(OH)D2 SERPL-MCNC: 37 NG/ML (ref 30–96)
ALBUMIN SERPL BCP-MCNC: 3.3 G/DL (ref 3.5–5.2)
ANION GAP SERPL CALC-SCNC: 9 MMOL/L (ref 8–16)
BASOPHILS # BLD AUTO: 0.03 K/UL (ref 0–0.2)
BASOPHILS NFR BLD: 0.4 % (ref 0–1.9)
BUN SERPL-MCNC: 19 MG/DL (ref 8–23)
CALCIUM SERPL-MCNC: 9.3 MG/DL (ref 8.7–10.5)
CHLORIDE SERPL-SCNC: 107 MMOL/L (ref 95–110)
CO2 SERPL-SCNC: 28 MMOL/L (ref 23–29)
CREAT SERPL-MCNC: 1.3 MG/DL (ref 0.5–1.4)
DIFFERENTIAL METHOD: ABNORMAL
EOSINOPHIL # BLD AUTO: 0.1 K/UL (ref 0–0.5)
EOSINOPHIL NFR BLD: 0.6 % (ref 0–8)
ERYTHROCYTE [DISTWIDTH] IN BLOOD BY AUTOMATED COUNT: 13.5 % (ref 11.5–14.5)
EST. GFR  (AFRICAN AMERICAN): >60 ML/MIN/1.73 M^2
EST. GFR  (NON AFRICAN AMERICAN): 57.3 ML/MIN/1.73 M^2
GLUCOSE SERPL-MCNC: 39 MG/DL (ref 70–110)
HCT VFR BLD AUTO: 46.3 % (ref 40–54)
HGB BLD-MCNC: 13.6 G/DL (ref 14–18)
IMM GRANULOCYTES # BLD AUTO: 0.08 K/UL (ref 0–0.04)
IMM GRANULOCYTES NFR BLD AUTO: 1 % (ref 0–0.5)
LYMPHOCYTES # BLD AUTO: 1.4 K/UL (ref 1–4.8)
LYMPHOCYTES NFR BLD: 17.7 % (ref 18–48)
MCH RBC QN AUTO: 25.4 PG (ref 27–31)
MCHC RBC AUTO-ENTMCNC: 29.4 G/DL (ref 32–36)
MCV RBC AUTO: 86 FL (ref 82–98)
MONOCYTES # BLD AUTO: 0.8 K/UL (ref 0.3–1)
MONOCYTES NFR BLD: 10.2 % (ref 4–15)
NEUTROPHILS # BLD AUTO: 5.7 K/UL (ref 1.8–7.7)
NEUTROPHILS NFR BLD: 70.1 % (ref 38–73)
NRBC BLD-RTO: 0 /100 WBC
PHOSPHATE SERPL-MCNC: 3 MG/DL (ref 2.7–4.5)
PLATELET # BLD AUTO: 181 K/UL (ref 150–350)
PMV BLD AUTO: 11 FL (ref 9.2–12.9)
POTASSIUM SERPL-SCNC: 3.6 MMOL/L (ref 3.5–5.1)
PTH-INTACT SERPL-MCNC: 215 PG/ML (ref 9–77)
RBC # BLD AUTO: 5.36 M/UL (ref 4.6–6.2)
SODIUM SERPL-SCNC: 144 MMOL/L (ref 136–145)
URATE SERPL-MCNC: 8 MG/DL (ref 3.4–7)
WBC # BLD AUTO: 8.14 K/UL (ref 3.9–12.7)

## 2019-05-15 PROCEDURE — 36415 COLL VENOUS BLD VENIPUNCTURE: CPT | Mod: PO

## 2019-05-15 PROCEDURE — 82306 VITAMIN D 25 HYDROXY: CPT

## 2019-05-15 PROCEDURE — 80069 RENAL FUNCTION PANEL: CPT

## 2019-05-15 PROCEDURE — 83970 ASSAY OF PARATHORMONE: CPT

## 2019-05-15 PROCEDURE — 84550 ASSAY OF BLOOD/URIC ACID: CPT

## 2019-05-15 PROCEDURE — 80197 ASSAY OF TACROLIMUS: CPT

## 2019-05-15 PROCEDURE — 85025 COMPLETE CBC W/AUTO DIFF WBC: CPT

## 2019-05-16 LAB — TACROLIMUS BLD-MCNC: 7.4 NG/ML (ref 5–15)

## 2019-05-21 DIAGNOSIS — Z94.0 KIDNEY REPLACED BY TRANSPLANT: Primary | ICD-10-CM

## 2019-05-21 DIAGNOSIS — E55.9 VITAMIN D DEFICIENCY: ICD-10-CM

## 2019-06-03 ENCOUNTER — CLINICAL SUPPORT (OUTPATIENT)
Dept: CARDIOLOGY | Facility: CLINIC | Age: 64
End: 2019-06-03
Attending: INTERNAL MEDICINE
Payer: COMMERCIAL

## 2019-06-03 ENCOUNTER — LAB VISIT (OUTPATIENT)
Dept: LAB | Facility: HOSPITAL | Age: 64
End: 2019-06-03
Attending: INTERNAL MEDICINE
Payer: COMMERCIAL

## 2019-06-03 DIAGNOSIS — I12.9 HYPERTENSION ASSOCIATED WITH STAGE 3 CHRONIC KIDNEY DISEASE DUE TO TYPE 2 DIABETES MELLITUS: ICD-10-CM

## 2019-06-03 DIAGNOSIS — Z94.0 KIDNEY REPLACED BY TRANSPLANT: ICD-10-CM

## 2019-06-03 DIAGNOSIS — N18.30 HYPERTENSION ASSOCIATED WITH STAGE 3 CHRONIC KIDNEY DISEASE DUE TO TYPE 2 DIABETES MELLITUS: ICD-10-CM

## 2019-06-03 DIAGNOSIS — E55.9 VITAMIN D DEFICIENCY: ICD-10-CM

## 2019-06-03 DIAGNOSIS — E11.22 HYPERTENSION ASSOCIATED WITH STAGE 3 CHRONIC KIDNEY DISEASE DUE TO TYPE 2 DIABETES MELLITUS: ICD-10-CM

## 2019-06-03 DIAGNOSIS — I50.42 CHRONIC COMBINED SYSTOLIC AND DIASTOLIC CONGESTIVE HEART FAILURE: ICD-10-CM

## 2019-06-03 LAB
25(OH)D3+25(OH)D2 SERPL-MCNC: 36 NG/ML (ref 30–96)
ALBUMIN SERPL BCP-MCNC: 3.4 G/DL (ref 3.5–5.2)
ALBUMIN SERPL BCP-MCNC: 3.4 G/DL (ref 3.5–5.2)
ALP SERPL-CCNC: 72 U/L (ref 55–135)
ALT SERPL W/O P-5'-P-CCNC: 20 U/L (ref 10–44)
ANION GAP SERPL CALC-SCNC: 13 MMOL/L (ref 8–16)
AST SERPL-CCNC: 17 U/L (ref 10–40)
BASOPHILS # BLD AUTO: 0.03 K/UL (ref 0–0.2)
BASOPHILS NFR BLD: 0.5 % (ref 0–1.9)
BILIRUB DIRECT SERPL-MCNC: 0.2 MG/DL (ref 0.1–0.3)
BILIRUB SERPL-MCNC: 0.5 MG/DL (ref 0.1–1)
BUN SERPL-MCNC: 25 MG/DL (ref 8–23)
CALCIUM SERPL-MCNC: 9.4 MG/DL (ref 8.7–10.5)
CHLORIDE SERPL-SCNC: 108 MMOL/L (ref 95–110)
CO2 SERPL-SCNC: 22 MMOL/L (ref 23–29)
CREAT SERPL-MCNC: 1.7 MG/DL (ref 0.5–1.4)
DIASTOLIC DYSFUNCTION: YES
DIFFERENTIAL METHOD: ABNORMAL
EOSINOPHIL # BLD AUTO: 0 K/UL (ref 0–0.5)
EOSINOPHIL NFR BLD: 0.6 % (ref 0–8)
ERYTHROCYTE [DISTWIDTH] IN BLOOD BY AUTOMATED COUNT: 13.7 % (ref 11.5–14.5)
EST. GFR  (AFRICAN AMERICAN): 47.9 ML/MIN/1.73 M^2
EST. GFR  (NON AFRICAN AMERICAN): 41.4 ML/MIN/1.73 M^2
GLOBAL PERICARDIAL EFFUSION: ABNORMAL
GLUCOSE SERPL-MCNC: 58 MG/DL (ref 70–110)
HCT VFR BLD AUTO: 43.7 % (ref 40–54)
HGB BLD-MCNC: 13.2 G/DL (ref 14–18)
IMM GRANULOCYTES # BLD AUTO: 0.06 K/UL (ref 0–0.04)
IMM GRANULOCYTES NFR BLD AUTO: 0.9 % (ref 0–0.5)
LYMPHOCYTES # BLD AUTO: 1 K/UL (ref 1–4.8)
LYMPHOCYTES NFR BLD: 15 % (ref 18–48)
MAGNESIUM SERPL-MCNC: 1.7 MG/DL (ref 1.6–2.6)
MCH RBC QN AUTO: 25.5 PG (ref 27–31)
MCHC RBC AUTO-ENTMCNC: 30.2 G/DL (ref 32–36)
MCV RBC AUTO: 85 FL (ref 82–98)
MONOCYTES # BLD AUTO: 0.7 K/UL (ref 0.3–1)
MONOCYTES NFR BLD: 11.2 % (ref 4–15)
NEUTROPHILS # BLD AUTO: 4.7 K/UL (ref 1.8–7.7)
NEUTROPHILS NFR BLD: 71.8 % (ref 38–73)
NRBC BLD-RTO: 0 /100 WBC
PHOSPHATE SERPL-MCNC: 3.1 MG/DL (ref 2.7–4.5)
PLATELET # BLD AUTO: 173 K/UL (ref 150–350)
PMV BLD AUTO: 11.3 FL (ref 9.2–12.9)
POTASSIUM SERPL-SCNC: 3.4 MMOL/L (ref 3.5–5.1)
PROT SERPL-MCNC: 6.4 G/DL (ref 6–8.4)
PTH-INTACT SERPL-MCNC: 271 PG/ML (ref 9–77)
RBC # BLD AUTO: 5.17 M/UL (ref 4.6–6.2)
RETIRED EF AND QEF - SEE NOTES: 45 (ref 55–65)
SODIUM SERPL-SCNC: 143 MMOL/L (ref 136–145)
WBC # BLD AUTO: 6.53 K/UL (ref 3.9–12.7)

## 2019-06-03 PROCEDURE — 82306 VITAMIN D 25 HYDROXY: CPT

## 2019-06-03 PROCEDURE — 80197 ASSAY OF TACROLIMUS: CPT

## 2019-06-03 PROCEDURE — 93306 TTE W/DOPPLER COMPLETE: CPT | Mod: S$GLB,,, | Performed by: NUCLEAR MEDICINE

## 2019-06-03 PROCEDURE — 36415 COLL VENOUS BLD VENIPUNCTURE: CPT

## 2019-06-03 PROCEDURE — 84075 ASSAY ALKALINE PHOSPHATASE: CPT

## 2019-06-03 PROCEDURE — 83970 ASSAY OF PARATHORMONE: CPT

## 2019-06-03 PROCEDURE — 87799 DETECT AGENT NOS DNA QUANT: CPT

## 2019-06-03 PROCEDURE — 83735 ASSAY OF MAGNESIUM: CPT

## 2019-06-03 PROCEDURE — 80069 RENAL FUNCTION PANEL: CPT

## 2019-06-03 PROCEDURE — 93306 2D ECHO WITH COLOR FLOW DOPPLER: ICD-10-PCS | Mod: S$GLB,,, | Performed by: NUCLEAR MEDICINE

## 2019-06-03 PROCEDURE — 85025 COMPLETE CBC W/AUTO DIFF WBC: CPT

## 2019-06-04 ENCOUNTER — TELEPHONE (OUTPATIENT)
Dept: TRANSPLANT | Facility: CLINIC | Age: 64
End: 2019-06-04

## 2019-06-04 LAB — TACROLIMUS BLD-MCNC: 5.3 NG/ML (ref 5–15)

## 2019-06-04 NOTE — TELEPHONE ENCOUNTER
----- Message from Silvia Sanchez MD sent at 6/4/2019  8:54 AM CDT -----  Potassium on lower end --> encourage slightly higher potassium

## 2019-06-04 NOTE — TELEPHONE ENCOUNTER
Patient repeated back and voice a understanding of orders.  High K diet reinforce.  Patient states he is only taking 40mg of lasix QD.

## 2019-06-17 ENCOUNTER — OFFICE VISIT (OUTPATIENT)
Dept: TRANSPLANT | Facility: CLINIC | Age: 64
End: 2019-06-17
Payer: COMMERCIAL

## 2019-06-17 VITALS
HEART RATE: 100 BPM | DIASTOLIC BLOOD PRESSURE: 68 MMHG | BODY MASS INDEX: 40.56 KG/M2 | RESPIRATION RATE: 18 BRPM | SYSTOLIC BLOOD PRESSURE: 129 MMHG | WEIGHT: 273.81 LBS | TEMPERATURE: 98 F | HEIGHT: 69 IN | OXYGEN SATURATION: 97 %

## 2019-06-17 DIAGNOSIS — I12.9 HYPERTENSION ASSOCIATED WITH STAGE 3 CHRONIC KIDNEY DISEASE DUE TO TYPE 2 DIABETES MELLITUS: ICD-10-CM

## 2019-06-17 DIAGNOSIS — E11.22 HYPERTENSION ASSOCIATED WITH STAGE 3 CHRONIC KIDNEY DISEASE DUE TO TYPE 2 DIABETES MELLITUS: ICD-10-CM

## 2019-06-17 DIAGNOSIS — D63.1 ANEMIA ASSOCIATED WITH CHRONIC RENAL FAILURE: ICD-10-CM

## 2019-06-17 DIAGNOSIS — E11.21 TYPE 2 DIABETES MELLITUS WITH DIABETIC NEPHROPATHY, UNSPECIFIED WHETHER LONG TERM INSULIN USE: ICD-10-CM

## 2019-06-17 DIAGNOSIS — Z94.0 KIDNEY TRANSPLANT STATUS, LIVING RELATED DONOR: Primary | Chronic | ICD-10-CM

## 2019-06-17 DIAGNOSIS — N18.30 CKD (CHRONIC KIDNEY DISEASE) STAGE 3, GFR 30-59 ML/MIN: ICD-10-CM

## 2019-06-17 DIAGNOSIS — N18.30 HYPERTENSION ASSOCIATED WITH STAGE 3 CHRONIC KIDNEY DISEASE DUE TO TYPE 2 DIABETES MELLITUS: ICD-10-CM

## 2019-06-17 DIAGNOSIS — Z29.89 PROPHYLACTIC IMMUNOTHERAPY: Chronic | ICD-10-CM

## 2019-06-17 DIAGNOSIS — I25.10 CORONARY ARTERY DISEASE INVOLVING NATIVE CORONARY ARTERY OF NATIVE HEART WITHOUT ANGINA PECTORIS: ICD-10-CM

## 2019-06-17 DIAGNOSIS — N18.9 ANEMIA ASSOCIATED WITH CHRONIC RENAL FAILURE: ICD-10-CM

## 2019-06-17 DIAGNOSIS — N25.81 SECONDARY HYPERPARATHYROIDISM OF RENAL ORIGIN: ICD-10-CM

## 2019-06-17 PROCEDURE — 1101F PR PT FALLS ASSESS DOC 0-1 FALLS W/OUT INJ PAST YR: ICD-10-PCS | Mod: CPTII,S$GLB,, | Performed by: NURSE PRACTITIONER

## 2019-06-17 PROCEDURE — 3074F PR MOST RECENT SYSTOLIC BLOOD PRESSURE < 130 MM HG: ICD-10-PCS | Mod: CPTII,S$GLB,, | Performed by: NURSE PRACTITIONER

## 2019-06-17 PROCEDURE — 3078F DIAST BP <80 MM HG: CPT | Mod: CPTII,S$GLB,, | Performed by: NURSE PRACTITIONER

## 2019-06-17 PROCEDURE — 99999 PR PBB SHADOW E&M-EST. PATIENT-LVL V: CPT | Mod: PBBFAC,,, | Performed by: NURSE PRACTITIONER

## 2019-06-17 PROCEDURE — 99999 PR PBB SHADOW E&M-EST. PATIENT-LVL V: ICD-10-PCS | Mod: PBBFAC,,, | Performed by: NURSE PRACTITIONER

## 2019-06-17 PROCEDURE — 3008F PR BODY MASS INDEX (BMI) DOCUMENTED: ICD-10-PCS | Mod: CPTII,S$GLB,, | Performed by: NURSE PRACTITIONER

## 2019-06-17 PROCEDURE — 99215 OFFICE O/P EST HI 40 MIN: CPT | Mod: S$GLB,,, | Performed by: NURSE PRACTITIONER

## 2019-06-17 PROCEDURE — 99215 PR OFFICE/OUTPT VISIT, EST, LEVL V, 40-54 MIN: ICD-10-PCS | Mod: S$GLB,,, | Performed by: NURSE PRACTITIONER

## 2019-06-17 PROCEDURE — 3008F BODY MASS INDEX DOCD: CPT | Mod: CPTII,S$GLB,, | Performed by: NURSE PRACTITIONER

## 2019-06-17 PROCEDURE — 3078F PR MOST RECENT DIASTOLIC BLOOD PRESSURE < 80 MM HG: ICD-10-PCS | Mod: CPTII,S$GLB,, | Performed by: NURSE PRACTITIONER

## 2019-06-17 PROCEDURE — 3074F SYST BP LT 130 MM HG: CPT | Mod: CPTII,S$GLB,, | Performed by: NURSE PRACTITIONER

## 2019-06-17 PROCEDURE — 1101F PT FALLS ASSESS-DOCD LE1/YR: CPT | Mod: CPTII,S$GLB,, | Performed by: NURSE PRACTITIONER

## 2019-06-17 PROCEDURE — 3044F PR MOST RECENT HEMOGLOBIN A1C LEVEL <7.0%: ICD-10-PCS | Mod: CPTII,S$GLB,, | Performed by: NURSE PRACTITIONER

## 2019-06-17 PROCEDURE — 3044F HG A1C LEVEL LT 7.0%: CPT | Mod: CPTII,S$GLB,, | Performed by: NURSE PRACTITIONER

## 2019-06-17 NOTE — PROGRESS NOTES
Kidney Post-Transplant Assessment    Referring Physician: Bucky Danielle  Current Nephrologist: Bucky Danielle    ORGAN: LEFT KIDNEY  Donor Type: living  PHS Increased Risk: no  Cold Ischemia: 44 mins  Induction Medications: thymoglobulin    Subjective:     CC:  Reassessment of renal allograft function and management of chronic immunosuppression.    HPI:  Mr. Whittaker is a 63 y.o. year old Black or  male who received a living kidney transplant on 6/12/15.  He has CKD stage 3 - GFR 30-59 and his baseline creatinine is between 1.6-1.8. He takes mycophenolate mofetil, prednisone and tacrolimus for maintenance immunosuppression.  Recent hospitalizations or ER visits since his previous clinic visit.   ~ 2-3 months ago was hospitalized for CHF at [facility] and F/U with [facility] Dr Muhammad.   BP stable  Overall feels well. No health concerns today.   Denies chest pain, SOB, leg pain, abdominal pain or LUTs.        Past Medical History:   Diagnosis Date    Acquired renal cyst of left kidney     Anemia associated with chronic renal failure     CAD (coronary artery disease)     nonobstructive [facility] 9/14    CHF (congestive heart failure)     Chronic immunosuppression with Prograf and MMF 6/18/2015    CKD (chronic kidney disease) stage 3, GFR 30-59 ml/min     Diabetic retinopathy     DM (diabetes mellitus), type 2 with complications 1994    Edema     End stage kidney disease     s/p transplant, doing well    Gallbladder polyp     Heart failure, diastolic, due to HTN     Hemodialysis status     off since transplant    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     History of colon polyps     HPTH (hyperparathyroidism)     Hyperlipidemia     Hypertension associated with stage 3 chronic kidney disease due to type 2 diabetes mellitus     Obesity     PCO (posterior capsular opacification), left 3/4/2019    Proteinuria     resolved s/p  "transplant    S/P kidney transplant     Sleep apnea     Type 2 diabetes, uncontrolled, with retinopathy     Type II diabetes mellitus with renal manifestations        Review of Systems   Constitutional: Negative for activity change, appetite change, chills, fatigue, fever and unexpected weight change.   HENT: Negative for congestion, facial swelling, postnasal drip, rhinorrhea, sinus pressure, sore throat and trouble swallowing.         Seasonal allergies   Eyes: Negative for pain, redness and visual disturbance.   Respiratory: Negative for cough, chest tightness, shortness of breath and wheezing.    Cardiovascular: Positive for leg swelling. Negative for chest pain and palpitations.   Gastrointestinal: Negative for abdominal pain, diarrhea, nausea and vomiting.   Genitourinary: Negative for dysuria, flank pain and urgency.   Musculoskeletal: Negative for gait problem, neck pain and neck stiffness.   Skin: Negative for rash.   Allergic/Immunologic: Positive for immunocompromised state. Negative for environmental allergies and food allergies.   Neurological: Negative for dizziness, weakness, light-headedness and headaches.   Psychiatric/Behavioral: Negative for agitation and confusion. The patient is not nervous/anxious.        Objective:   Blood pressure 129/68, pulse 100, temperature 98 °F (36.7 °C), temperature source Oral, resp. rate 18, height 5' 8.78" (1.747 m), weight 124.2 kg (273 lb 13 oz), SpO2 97 %.body mass index is 40.69 kg/m².    Physical Exam   Constitutional: He is oriented to person, place, and time. He appears well-developed and well-nourished.   HENT:   Head: Normocephalic.   Mouth/Throat: Oropharynx is clear and moist. No oropharyngeal exudate.   Eyes: Pupils are equal, round, and reactive to light. Conjunctivae and EOM are normal. No scleral icterus.   Neck: Normal range of motion. Neck supple.   Cardiovascular: Normal rate, regular rhythm and normal heart sounds.   Pulmonary/Chest: Effort " normal and breath sounds normal.   Abdominal: Soft. Normal appearance and bowel sounds are normal. He exhibits no distension and no mass. There is no splenomegaly or hepatomegaly. There is no tenderness. There is no rebound, no guarding, no CVA tenderness, no tenderness at McBurney's point and negative Durand's sign.       Musculoskeletal: Normal range of motion. He exhibits edema.   Bilateral peripheral edema + 1   Lymphadenopathy:     He has no cervical adenopathy.   Neurological: He is alert and oriented to person, place, and time. He exhibits normal muscle tone. Coordination normal.   Skin: Skin is warm and dry.   Psychiatric: He has a normal mood and affect. His behavior is normal.   Vitals reviewed.      Labs:  Lab Results   Component Value Date    WBC 6.53 06/03/2019    HGB 13.2 (L) 06/03/2019    HCT 43.7 06/03/2019     06/03/2019    K 3.4 (L) 06/03/2019     06/03/2019    CO2 22 (L) 06/03/2019    BUN 25 (H) 06/03/2019    CREATININE 1.7 (H) 06/03/2019    EGFRNONAA 41.4 (A) 06/03/2019    CALCIUM 9.4 06/03/2019    PHOS 3.1 06/03/2019    MG 1.7 06/03/2019    ALBUMIN 3.4 (L) 06/03/2019    ALBUMIN 3.4 (L) 06/03/2019    AST 17 06/03/2019    ALT 20 06/03/2019       No results found for: EXTANC, EXTWBC, EXTSEGS, EXTPLATELETS, EXTHEMOGLOBI, EXTHEMATOCRI, EXTCREATININ, EXTSODIUM, EXTPOTASSIUM, EXTBUN, EXTCO2, EXTCALCIUM, EXTPHOSPHORU, EXTGLUCOSE, EXTALBUMIN, EXTAST, EXTALT, EXTBILITOTAL, EXTLIPASE, EXTAMYLASE    No results found for: EXTCYCLOSLVL, EXTSIROLIMUS, EXTTACROLVL, EXTPROTCRE, EXTPTHINTACT, EXTPROTEINUA, EXTWBCUA, EXTRBCUA    Labs were reviewed with the patient.    Assessment:     1. Living-related donor kidney transplant (daughter) - 6/12/15    2. Type 2 diabetes mellitus with diabetic nephropathy, unspecified whether long term insulin use    3. Secondary hyperparathyroidism of renal origin    4. CKD (chronic kidney disease) stage 3, GFR 30-59 ml/min    5. Chronic immunosuppression with Prograf,  MMF and prednisone    6. Coronary artery disease involving native coronary artery of native heart without angina pectoris    7. Anemia associated with chronic renal failure    8. Hypertension associated with stage 3 chronic kidney disease due to type 2 diabetes mellitus        Plan:   Cardiology f/u 7/1    Follow-up:   1. CKD stage: 3 stable    2. Immunosuppression:   Prograf trough 5.3, which is  therapeutic target 4-7. Continue  Prograf 4/4, [id number] Mg BID, and Prednisone 5 mg QD. Ketoconazole 100 mg QD. Will continue to monitor for drug toxicities    3. Allograft Function: Stable. Continue good po hydration.   Lab Results   Component Value Date    CREATININE 1.7 (H) 06/03/2019         6/3/2019  3yr 11mo   eGFR if African American >60 mL/min/1.73 m^2 47.9 (A)     DM 2 med regime as prescribed -->MGMT deferred to endocrinology/PCP       4. Hypertension management: stable: advise low salt diet and home BP monitoring     Ertresto and lasix  as prescribed -->MGMT deferred to  cardiology      5. Metabolic Bone Disease/Secondary Hyperparathyroidism:stable  Will monitor PTH, CA and Vit D/guidelines,          6/3/2019  3yr 11mo   Magnesium 1.6 - 2.6 mg/dL 1.7       Lab Results   Component Value Date    .0 (H) 06/03/2019    CALCIUM 9.4 06/03/2019    PHOS 3.1 06/03/2019    Ergocalciferol 50,000 Units / week--> as prescribed -->MGMT deferred to general nephrology      6. Electrolytes:  Will monitor /guidelines  Lab Results   Component Value Date     06/03/2019    K 3.4 (L) 06/03/2019     06/03/2019    CO2 22 (L) 06/03/2019   Add K+ rich foods to diet. F/u with cardiology as scheduled on 7/1/2019 -->MGMT deferred to general nephrology      7. Anemia: stable. No need for intervention    Will monitor /guidelines  Lab Results   Component Value Date    WBC 6.53 06/03/2019    HGB 13.2 (L) 06/03/2019    HCT 43.7 06/03/2019    MCV 85 06/03/2019     06/03/2019       8.  Cytopenias: no significant cytopenias  will monitor as per our guidelines. Medicine list reviewed including potential causes of drug-induced cytopenias    9.Proteinuria: continue p/c ratio as per guidelines           6/3/2019  3yr 11mo   Prot/Creat Ratio, Ur 0.00 - 0.20 0.09       10. BK virus infection screening:  will continue to monitor/ guidelines        6/3/2019  3yr 11mo   BK Virus DNA, Blood Not detected Not detected       6/3/2019  3yr 11mo   BK Virus DNA PCR, Quant, Blood <125 Copies/mL <125           11. Weight education: provided during the clinic visit   Body mass index is 40.69 kg/m².  Encourage lifestyle modifications: diet, exercise, weight loss, low sodium diet/ 2 gms/day or less          12.Patient safety education regarding immunosuppression including prophylaxis posttransplant for CMV, PCP : Education provided about vaccination and prevention of infections            Follow-up:   Annual follow-up with kidney transplant clinic with repeat labs, including renal function panel with UA, urine protein/creatinine ratio, and drug trough level q3 months.  Patient also reminded to follow-up with general nephrologist.    Gita Trevizo NP       Education:   Material provided to the patient.  Patient reminded to call with any health changes since these can be early signs of significant complications.  Also, I advised the patient to be sure any new medications or changes of old medications are discussed with either a pharmacist or physician knowledgeable with transplant to avoid rejection/drug toxicity related to significant drug interactions.    UNOS Patient Status  Functional Status: 80% - Normal activity with effort: some symptoms of disease  Physical Capacity: No Limitations

## 2019-06-17 NOTE — LETTER
June 17, 2019        Bucky Danielle  [address] [place] [address]  Phone: [phone]  Fax: [phone]             [facility]  [address]  [facility] [address]  Phone: [phone]   Patient: Mitch Whittaker   MR Number: [id number]   YOB: 1955   Date of Visit: 6/17/2019       Dear Dr. Bucky Danielle    Thank you for referring Mitch Whittaker to me for evaluation. Attached you will find relevant portions of my assessment and plan of care.    If you have questions, please do not hesitate to call me. I look forward to following Mitch Whittaker along with you.    Sincerely,    Gita Trevizo NP    Enclosure    If you would like to receive this communication electronically, please contact [email or web address] or [phone] to request [employer] access.    [employer] is a tool which provides read-only access to select patient information with whom you have a relationship. Its easy to use and provides real time access to review your patients record including encounter summaries, notes, results, and demographic information.    If you feel you have received this communication in error or would no longer like to receive these types of communications, please e-mail [email or web address]

## 2019-06-18 ENCOUNTER — OFFICE VISIT (OUTPATIENT)
Dept: SLEEP MEDICINE | Facility: CLINIC | Age: 64
End: 2019-06-18
Payer: COMMERCIAL

## 2019-06-18 VITALS
SYSTOLIC BLOOD PRESSURE: 126 MMHG | WEIGHT: 275.13 LBS | RESPIRATION RATE: 19 BRPM | HEIGHT: 69 IN | DIASTOLIC BLOOD PRESSURE: 80 MMHG | OXYGEN SATURATION: 96 % | HEART RATE: 94 BPM | BODY MASS INDEX: 40.75 KG/M2

## 2019-06-18 DIAGNOSIS — E66.01 MORBID OBESITY WITH BMI OF 40.0-44.9, ADULT: ICD-10-CM

## 2019-06-18 DIAGNOSIS — G47.33 OBSTRUCTIVE SLEEP APNEA SYNDROME: Primary | ICD-10-CM

## 2019-06-18 DIAGNOSIS — F51.12 BEHAVIORALLY INDUCED INSUFFICIENT SLEEP SYNDROME: ICD-10-CM

## 2019-06-18 PROCEDURE — 3079F PR MOST RECENT DIASTOLIC BLOOD PRESSURE 80-89 MM HG: ICD-10-PCS | Mod: CPTII,S$GLB,, | Performed by: NURSE PRACTITIONER

## 2019-06-18 PROCEDURE — 3008F PR BODY MASS INDEX (BMI) DOCUMENTED: ICD-10-PCS | Mod: CPTII,S$GLB,, | Performed by: NURSE PRACTITIONER

## 2019-06-18 PROCEDURE — 3008F BODY MASS INDEX DOCD: CPT | Mod: CPTII,S$GLB,, | Performed by: NURSE PRACTITIONER

## 2019-06-18 PROCEDURE — 99214 OFFICE O/P EST MOD 30 MIN: CPT | Mod: S$GLB,,, | Performed by: NURSE PRACTITIONER

## 2019-06-18 PROCEDURE — 99999 PR PBB SHADOW E&M-EST. PATIENT-LVL V: ICD-10-PCS | Mod: PBBFAC,,, | Performed by: NURSE PRACTITIONER

## 2019-06-18 PROCEDURE — 3074F PR MOST RECENT SYSTOLIC BLOOD PRESSURE < 130 MM HG: ICD-10-PCS | Mod: CPTII,S$GLB,, | Performed by: NURSE PRACTITIONER

## 2019-06-18 PROCEDURE — 1101F PT FALLS ASSESS-DOCD LE1/YR: CPT | Mod: CPTII,S$GLB,, | Performed by: NURSE PRACTITIONER

## 2019-06-18 PROCEDURE — 99999 PR PBB SHADOW E&M-EST. PATIENT-LVL V: CPT | Mod: PBBFAC,,, | Performed by: NURSE PRACTITIONER

## 2019-06-18 PROCEDURE — 99214 PR OFFICE/OUTPT VISIT, EST, LEVL IV, 30-39 MIN: ICD-10-PCS | Mod: S$GLB,,, | Performed by: NURSE PRACTITIONER

## 2019-06-18 PROCEDURE — 3074F SYST BP LT 130 MM HG: CPT | Mod: CPTII,S$GLB,, | Performed by: NURSE PRACTITIONER

## 2019-06-18 PROCEDURE — 3079F DIAST BP 80-89 MM HG: CPT | Mod: CPTII,S$GLB,, | Performed by: NURSE PRACTITIONER

## 2019-06-18 PROCEDURE — 1101F PR PT FALLS ASSESS DOC 0-1 FALLS W/OUT INJ PAST YR: ICD-10-PCS | Mod: CPTII,S$GLB,, | Performed by: NURSE PRACTITIONER

## 2019-06-18 RX ORDER — GLIMEPIRIDE 4 MG/1
4 TABLET ORAL
Qty: 90 TABLET | Refills: 3 | Status: SHIPPED | OUTPATIENT
Start: 2019-06-18 | End: 2020-06-05 | Stop reason: SDUPTHER

## 2019-06-18 NOTE — PROGRESS NOTES
"Subjective:      Patient ID: Mitch Whittaker is a 63 y.o. male.    Chief Complaint: Sleep Apnea    HPI  Patient presents to the office today for evaluation of sleep apnea.  Patient has not been on CPAP until recently due to losing his CPAP in the [place] of [place].  Since obtaining his CPAP he is wore nightly and is greatly benefitting from use.  He does admit to random sleep and wake times.  Not sufficient  amount of sleep at night due to watching TV.  He does nap during the day.    Patient Active Problem List   Diagnosis    Anemia associated with chronic renal failure    Obesity    Acquired renal cyst of left kidney    Nephrolithiasis    Sleep apnea    Pseudophakia    CAD (coronary artery disease)    Living-related donor kidney transplant (daughter) - 6/12/15    Diabetic peripheral neuropathy    Chronic immunosuppression with Prograf, MMF and prednisone    Secondary hyperparathyroidism of renal origin    CKD (chronic kidney disease) stage 3, GFR 30-59 ml/min    Type II diabetes mellitus with renal manifestations    Hypertension associated with stage 3 chronic kidney disease due to type 2 diabetes mellitus    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    DM (diabetes mellitus), type 2 with complications    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, with long-term current use of insulin    Epiretinal membrane (ERM) of both eyes    History of colon polyps    Colon polyps    Benign prostatic hyperplasia without lower urinary tract symptoms    PCO (posterior capsular opacification), left    Chronic combined systolic and diastolic congestive heart failure       /80   Pulse 94   Resp 19   Ht 5' 8.78" (1.747 m)   Wt 124.8 kg (275 lb 2.2 oz)   SpO2 96%   BMI 40.89 kg/m²   Body mass index is 40.89 kg/m².    Review of Systems   Constitutional: Negative.    HENT: Negative.    Respiratory: Negative.    Cardiovascular: Negative.    Musculoskeletal: Negative.  " "  Gastrointestinal: Negative.    Neurological: Negative.    Psychiatric/Behavioral: Negative.      Objective:      Physical Exam   Constitutional: He is oriented to person, place, and time. He appears well-developed and well-nourished.   obese   HENT:   Head: Normocephalic and atraumatic.   Nose: Nose normal.   Mouth/Throat: Uvula is midline and oropharynx is clear and moist.   Neck: Trachea normal and normal range of motion. Neck supple. No thyroid mass and no thyromegaly present.   Cardiovascular: Normal rate, regular rhythm and normal heart sounds.   Pulmonary/Chest: Effort normal and breath sounds normal. He has no wheezes. He has no rhonchi. He has no rales. Chest wall is not dull to percussion.   Abdominal: Soft. He exhibits no mass. There is no hepatosplenomegaly or splenomegaly. There is no tenderness.   Musculoskeletal: Normal range of motion. He exhibits no edema.   Neurological: He is alert and oriented to person, place, and time.   Skin: Skin is warm and dry.   Psychiatric: He has a normal mood and affect.     Personal Diagnostic Review  Autopap download: Patient wears on average 5 hrs and 51 minutes. Greater than 4 hrs 90 % of the time. 90% percentile pressure 12 with AHI 3.3 events/hr      Assessment:       1. Obstructive sleep apnea syndrome    2. Morbid obesity with BMI of 40.0-44.9, adult    3. Behaviorally induced insufficient sleep syndrome        Outpatient Encounter Medications as of 6/18/2019   Medication Sig Dispense Refill    aspirin (ECOTRIN) 81 MG EC tablet Take 1 tablet by mouth Daily.       BD ULTRA-FINE MINI PEN NEEDLE 31 gauge x 3/16" Ndle Use to inject insulin as needed up to 4 times daily 100 each 1    bisacodyl (DULCOLAX) 5 mg EC tablet Take 5 mg by mouth daily as needed for Constipation.      blood sugar diagnostic Strp Use to test four times per day 150 strip 11    ergocalciferol (VITAMIN D2) 50,000 unit Cap Take 1 capsule by mouth every 7 days 12 capsule 3    famotidine " "(PEPCID) 20 MG tablet TAKE ONE TABLET BY MOUTH EVERY EVENING 30 tablet 11    fish oil-omega-3 fatty acids 300-1,000 mg capsule Take 1 g by mouth.      fish,bora,flax oils-om3,6,9no1 1,200 mg Cap Take by mouth.      fluticasone (FLONASE) 50 mcg/actuation nasal spray use 2 sprays (100 mcg total) in each nostril once daily 16 g 0    furosemide (LASIX) 40 MG tablet Take 1 tablet (40 mg total) by mouth once daily. 30 tablet 11    glimepiride (AMARYL) 4 MG tablet Take 1 tablet (4 mg total) by mouth before breakfast. 90 tablet 3    insulin aspart U-100 (NOVOLOG FLEXPEN U-100 INSULIN) 100 unit/mL (3 mL) InPn pen Inject 25 Units into the skin 3 (three) times daily with meals. 30 mL 11    insulin aspart U-100 (NOVOLOG FLEXPEN U-100 INSULIN) 100 unit/mL InPn pen Inject 20 Units into the skin 3 (three) times daily with meals. 30 mL 11    insulin glargine 100 units/mL (3mL) SubQ pen Inject 30 Units into the skin 2 (two) times daily. 30 mL 11    ketoconazole (NIZORAL) 200 mg Tab Take 0.5 tablets (100 mg total) by mouth once daily. 15 tablet 9    lancets 33 gauge Misc 1 Stick by Misc.(Non-Drug; Combo Route) route as directed. Contour lancets. Use to check BG 2-3 times daily. 150 each 11    metFORMIN (GLUCOPHAGE) 500 MG tablet Take 1 tablet (500 mg total) by mouth 2 (two) times daily with meals. 180 tablet 3    metoprolol succinate (TOPROL-XL) 25 MG 24 hr tablet Take 1 tablet (25 mg total) by mouth once daily. 30 tablet 11    multivitamin (THERA) tablet Take 1 tablet by mouth.      multivitamin (THERAGRAN) tablet Take 1 tablet by mouth once daily.      multivitamin capsule Take 1 capsule by mouth once daily.      mycophenolate (CELLCEPT) 250 mg Cap TAKE THREE CAPSULES BY MOUTH TWICE A  capsule 11    omega-3 fatty acids-vitamin E (FISH OIL) 1,000 mg Cap Take 1 capsule by mouth once daily.       pen needle, diabetic (BD INSULIN PEN NEEDLE UF SHORT) 31 gauge x 5/16" Ndle USE TO INJECT INSULIN TWICE A  " each 5    polyethylene glycol (GLYCOLAX) 17 gram PwPk Take 17 grams by mouth daily for 7 days. 7 packet 0    predniSONE (DELTASONE) 5 MG tablet Take 1 tablet (5 mg total) by mouth once daily. 30 tablet 11    rosuvastatin (CRESTOR) 40 MG Tab TAKE ONE TABLET BY MOUTH EVERY EVENING 30 tablet 11    sacubitril-valsartan (ENTRESTO) 24-26 mg per tablet Take 1 tablet by mouth 2 (two) times daily. 60 tablet 3    tacrolimus (PROGRAF) 1 MG Cap Take 4 capsules (4 mg total) by mouth every 12 (twelve) hours. 270 capsule 9    tamsulosin (FLOMAX) 0.4 mg Cap Take 1 capsule (0.4 mg total) by mouth once daily. 30 capsule 11    finasteride (PROSCAR) 5 mg tablet Take 1 tablet (5 mg total) by mouth once daily. 30 tablet 11    [DISCONTINUED] furosemide (LASIX) 80 MG tablet Take 0.5 tablets (40 mg) by mouth 2 (two) times daily. 30 tablet 11    [DISCONTINUED] glimepiride (AMARYL) 4 MG tablet Take 1 tablet (4 mg total) by mouth before breakfast. 90 tablet 3    [DISCONTINUED] HUMALOG KWIKPEN 100 unit/mL InPn pen INJECT 15 UNITS SUBCUTANEOUSLY 10 TO 15 MINUTES BEFORE MEALS 15 Syringe 5     No facility-administered encounter medications on file as of 6/18/2019.      Orders Placed This Encounter   Procedures    CPAP/BIPAP SUPPLIES     Order Specific Question:   Type of mask:     Answer:   FFM     Order Specific Question:   Headgear?     Answer:   Yes     Order Specific Question:   Tubing?     Answer:   Yes     Order Specific Question:   Humidifier chamber?     Answer:   Yes     Order Specific Question:   Chin strap?     Answer:   Yes     Order Specific Question:   Filters?     Answer:   Yes     Order Specific Question:   Cushions?     Answer:   Yes     Order Specific Question:   Length of need (1-99 months):     Answer:   99     Plan:   Doing well on PAP settings. Patient is compliant. Follow up in 12 months with PAP data download or call earlier if any problems.     Weight loss and exercise to improve overall health.  Discussed sleep  scheduling  Problem List Items Addressed This Visit        Other    Sleep apnea - Primary    Overview     On CPAP         Relevant Orders    CPAP/BIPAP SUPPLIES      Other Visit Diagnoses     Morbid obesity with BMI of 40.0-44.9, adult        Behaviorally induced insufficient sleep syndrome

## 2019-06-25 RX ORDER — FUROSEMIDE 80 MG/1
TABLET ORAL
Qty: 30 TABLET | Refills: 11 | Status: SHIPPED | OUTPATIENT
Start: 2019-06-25 | End: 2019-09-20

## 2019-07-03 ENCOUNTER — LAB VISIT (OUTPATIENT)
Dept: LAB | Facility: HOSPITAL | Age: 64
End: 2019-07-03
Attending: INTERNAL MEDICINE
Payer: COMMERCIAL

## 2019-07-03 DIAGNOSIS — Z79.4 TYPE 2 DIABETES MELLITUS WITH DIABETIC NEPHROPATHY, WITH LONG-TERM CURRENT USE OF INSULIN: ICD-10-CM

## 2019-07-03 DIAGNOSIS — E11.21 TYPE 2 DIABETES MELLITUS WITH DIABETIC NEPHROPATHY, WITH LONG-TERM CURRENT USE OF INSULIN: ICD-10-CM

## 2019-07-03 LAB
ANION GAP SERPL CALC-SCNC: 14 MMOL/L (ref 8–16)
BASOPHILS # BLD AUTO: 0.03 K/UL (ref 0–0.2)
BASOPHILS NFR BLD: 0.4 % (ref 0–1.9)
BUN SERPL-MCNC: 24 MG/DL (ref 8–23)
CALCIUM SERPL-MCNC: 8.6 MG/DL (ref 8.7–10.5)
CHLORIDE SERPL-SCNC: 103 MMOL/L (ref 95–110)
CHOLEST SERPL-MCNC: 217 MG/DL (ref 120–199)
CHOLEST/HDLC SERPL: 4.2 {RATIO} (ref 2–5)
CO2 SERPL-SCNC: 28 MMOL/L (ref 23–29)
CREAT SERPL-MCNC: 1.8 MG/DL (ref 0.5–1.4)
DIFFERENTIAL METHOD: ABNORMAL
EOSINOPHIL # BLD AUTO: 0.1 K/UL (ref 0–0.5)
EOSINOPHIL NFR BLD: 0.6 % (ref 0–8)
ERYTHROCYTE [DISTWIDTH] IN BLOOD BY AUTOMATED COUNT: 13.3 % (ref 11.5–14.5)
EST. GFR  (AFRICAN AMERICAN): 44.7 ML/MIN/1.73 M^2
EST. GFR  (NON AFRICAN AMERICAN): 38.6 ML/MIN/1.73 M^2
ESTIMATED AVG GLUCOSE: 160 MG/DL (ref 68–131)
GLUCOSE SERPL-MCNC: 64 MG/DL (ref 70–110)
HBA1C MFR BLD HPLC: 7.2 % (ref 4–5.6)
HCT VFR BLD AUTO: 44.7 % (ref 40–54)
HDLC SERPL-MCNC: 52 MG/DL (ref 40–75)
HDLC SERPL: 24 % (ref 20–50)
HGB BLD-MCNC: 13.6 G/DL (ref 14–18)
IMM GRANULOCYTES # BLD AUTO: 0.08 K/UL (ref 0–0.04)
IMM GRANULOCYTES NFR BLD AUTO: 1 % (ref 0–0.5)
LDLC SERPL CALC-MCNC: 129.2 MG/DL (ref 63–159)
LYMPHOCYTES # BLD AUTO: 1.1 K/UL (ref 1–4.8)
LYMPHOCYTES NFR BLD: 12.8 % (ref 18–48)
MCH RBC QN AUTO: 25.7 PG (ref 27–31)
MCHC RBC AUTO-ENTMCNC: 30.4 G/DL (ref 32–36)
MCV RBC AUTO: 84 FL (ref 82–98)
MONOCYTES # BLD AUTO: 0.9 K/UL (ref 0.3–1)
MONOCYTES NFR BLD: 10.5 % (ref 4–15)
NEUTROPHILS # BLD AUTO: 6.2 K/UL (ref 1.8–7.7)
NEUTROPHILS NFR BLD: 74.7 % (ref 38–73)
NONHDLC SERPL-MCNC: 165 MG/DL
NRBC BLD-RTO: 0 /100 WBC
PLATELET # BLD AUTO: 196 K/UL (ref 150–350)
PMV BLD AUTO: 11.9 FL (ref 9.2–12.9)
POTASSIUM SERPL-SCNC: 3.1 MMOL/L (ref 3.5–5.1)
RBC # BLD AUTO: 5.3 M/UL (ref 4.6–6.2)
SODIUM SERPL-SCNC: 145 MMOL/L (ref 136–145)
TRIGL SERPL-MCNC: 179 MG/DL (ref 30–150)
TSH SERPL DL<=0.005 MIU/L-ACNC: 0.57 UIU/ML (ref 0.4–4)
WBC # BLD AUTO: 8.26 K/UL (ref 3.9–12.7)

## 2019-07-03 PROCEDURE — 85025 COMPLETE CBC W/AUTO DIFF WBC: CPT

## 2019-07-03 PROCEDURE — 83036 HEMOGLOBIN GLYCOSYLATED A1C: CPT

## 2019-07-03 PROCEDURE — 80061 LIPID PANEL: CPT

## 2019-07-03 PROCEDURE — 80048 BASIC METABOLIC PNL TOTAL CA: CPT

## 2019-07-03 PROCEDURE — 84443 ASSAY THYROID STIM HORMONE: CPT

## 2019-07-03 PROCEDURE — 36415 COLL VENOUS BLD VENIPUNCTURE: CPT | Mod: PO

## 2019-07-05 ENCOUNTER — OFFICE VISIT (OUTPATIENT)
Dept: CARDIOLOGY | Facility: CLINIC | Age: 64
End: 2019-07-05
Payer: COMMERCIAL

## 2019-07-05 VITALS
BODY MASS INDEX: 39.93 KG/M2 | HEART RATE: 108 BPM | HEIGHT: 69 IN | DIASTOLIC BLOOD PRESSURE: 80 MMHG | WEIGHT: 269.63 LBS | SYSTOLIC BLOOD PRESSURE: 128 MMHG

## 2019-07-05 DIAGNOSIS — E11.3553 TYPE 2 DIABETES MELLITUS WITH STABLE PROLIFERATIVE RETINOPATHY OF BOTH EYES, WITH LONG-TERM CURRENT USE OF INSULIN: Primary | ICD-10-CM

## 2019-07-05 DIAGNOSIS — M79.606 PAIN OF LOWER EXTREMITY, UNSPECIFIED LATERALITY: ICD-10-CM

## 2019-07-05 DIAGNOSIS — I12.9 HYPERTENSION ASSOCIATED WITH STAGE 3 CHRONIC KIDNEY DISEASE DUE TO TYPE 2 DIABETES MELLITUS: ICD-10-CM

## 2019-07-05 DIAGNOSIS — R60.9 EDEMA, UNSPECIFIED TYPE: ICD-10-CM

## 2019-07-05 DIAGNOSIS — Z79.4 TYPE 2 DIABETES MELLITUS WITH STABLE PROLIFERATIVE RETINOPATHY OF BOTH EYES, WITH LONG-TERM CURRENT USE OF INSULIN: Primary | ICD-10-CM

## 2019-07-05 DIAGNOSIS — N18.30 CKD (CHRONIC KIDNEY DISEASE) STAGE 3, GFR 30-59 ML/MIN: ICD-10-CM

## 2019-07-05 DIAGNOSIS — E11.22 HYPERTENSION ASSOCIATED WITH STAGE 3 CHRONIC KIDNEY DISEASE DUE TO TYPE 2 DIABETES MELLITUS: ICD-10-CM

## 2019-07-05 DIAGNOSIS — Z94.0 KIDNEY TRANSPLANT STATUS, LIVING RELATED DONOR: Chronic | ICD-10-CM

## 2019-07-05 DIAGNOSIS — I25.10 CORONARY ARTERY DISEASE INVOLVING NATIVE CORONARY ARTERY OF NATIVE HEART WITHOUT ANGINA PECTORIS: ICD-10-CM

## 2019-07-05 DIAGNOSIS — G47.33 OBSTRUCTIVE SLEEP APNEA SYNDROME: ICD-10-CM

## 2019-07-05 DIAGNOSIS — I50.42 CHRONIC COMBINED SYSTOLIC AND DIASTOLIC CONGESTIVE HEART FAILURE: ICD-10-CM

## 2019-07-05 DIAGNOSIS — N18.30 HYPERTENSION ASSOCIATED WITH STAGE 3 CHRONIC KIDNEY DISEASE DUE TO TYPE 2 DIABETES MELLITUS: ICD-10-CM

## 2019-07-05 PROCEDURE — 99214 PR OFFICE/OUTPT VISIT, EST, LEVL IV, 30-39 MIN: ICD-10-PCS | Mod: S$GLB,,, | Performed by: INTERNAL MEDICINE

## 2019-07-05 PROCEDURE — 3074F SYST BP LT 130 MM HG: CPT | Mod: CPTII,S$GLB,, | Performed by: INTERNAL MEDICINE

## 2019-07-05 PROCEDURE — 1101F PT FALLS ASSESS-DOCD LE1/YR: CPT | Mod: CPTII,S$GLB,, | Performed by: INTERNAL MEDICINE

## 2019-07-05 PROCEDURE — 1101F PR PT FALLS ASSESS DOC 0-1 FALLS W/OUT INJ PAST YR: ICD-10-PCS | Mod: CPTII,S$GLB,, | Performed by: INTERNAL MEDICINE

## 2019-07-05 PROCEDURE — 3008F PR BODY MASS INDEX (BMI) DOCUMENTED: ICD-10-PCS | Mod: CPTII,S$GLB,, | Performed by: INTERNAL MEDICINE

## 2019-07-05 PROCEDURE — 99999 PR PBB SHADOW E&M-EST. PATIENT-LVL III: CPT | Mod: PBBFAC,,, | Performed by: INTERNAL MEDICINE

## 2019-07-05 PROCEDURE — 99999 PR PBB SHADOW E&M-EST. PATIENT-LVL III: ICD-10-PCS | Mod: PBBFAC,,, | Performed by: INTERNAL MEDICINE

## 2019-07-05 PROCEDURE — 3045F PR MOST RECENT HEMOGLOBIN A1C LEVEL 7.0-9.0%: CPT | Mod: CPTII,S$GLB,, | Performed by: INTERNAL MEDICINE

## 2019-07-05 PROCEDURE — 3008F BODY MASS INDEX DOCD: CPT | Mod: CPTII,S$GLB,, | Performed by: INTERNAL MEDICINE

## 2019-07-05 PROCEDURE — 3074F PR MOST RECENT SYSTOLIC BLOOD PRESSURE < 130 MM HG: ICD-10-PCS | Mod: CPTII,S$GLB,, | Performed by: INTERNAL MEDICINE

## 2019-07-05 PROCEDURE — 99214 OFFICE O/P EST MOD 30 MIN: CPT | Mod: S$GLB,,, | Performed by: INTERNAL MEDICINE

## 2019-07-05 PROCEDURE — 3045F PR MOST RECENT HEMOGLOBIN A1C LEVEL 7.0-9.0%: ICD-10-PCS | Mod: CPTII,S$GLB,, | Performed by: INTERNAL MEDICINE

## 2019-07-05 PROCEDURE — 3079F PR MOST RECENT DIASTOLIC BLOOD PRESSURE 80-89 MM HG: ICD-10-PCS | Mod: CPTII,S$GLB,, | Performed by: INTERNAL MEDICINE

## 2019-07-05 PROCEDURE — 3079F DIAST BP 80-89 MM HG: CPT | Mod: CPTII,S$GLB,, | Performed by: INTERNAL MEDICINE

## 2019-07-05 NOTE — PROGRESS NOTES
Subjective:   Patient ID:  Mitch Whittaker is a 63 y.o. male who presents for cardiac consult of chronic combined systolic and diastolic congestive heart elizabeth      HPI  The patient came in today for cardiac consult of chronic combined systolic and diastolic congestive heart elizabeth      Mitch Whittaker is a 63 y.o. male with current medical conditions non obs CAD, HFrEF 25-30%, CKD, DM, MARION, HTN, HLD, ESRD s/p renal transplant presents for follow up CV eval.     3/15/19  Pt of Dr. Morris, last seen in clinic 2016. Pt presents after recent admission for CHF at [facility] -   Patient is a 63-year-old gentleman who presented to the hospital with shortness of breath. He was found to have pulmonary edema on chest x-ray and bilateral pedal edema. He was admitted for treatment of acute CHF. Echo showed reduced ejection fraction at 25-30%. He was treated with IV Lasix 80 mg twice daily that has been transitioned to Lasix p.o. Patient is not euvolemic and his creatinine is trending up for this reason I am cutting back on Lasix to 40 mg twice daily. Can follow-up with cardiology in 3 days. He sees one [facility]. He was seen by Dr. Omayra Esparza from [facility]. Recommendation is to start him on Entresto once his renal functions stabilize.Pt had edema and SOB while in bed and then went to hospital. Prior to admission he was on lasix PRN. He will see Dr. Pacheco for nephro eval, transplant nephro Dr. Dejesus.  Has been feeling good on lasix 40 BID, has been walking well overall. Does not have CPAP machine.     5/13/19  He was started on Entresto after last visit as renal function improved/stabilized. Has seen Dr. Mason recently started on CPAP again. Toprol dec to 25 mg due to hypotension. Discussed will repeat echo in 1 month to evaluate LV function, will refer for ICD if EF remains low. He woke up with right sided back pain. He has been using CPAP for about a little over a week. Has been doing well lately overall, BP well  controlled, no STEELE/LE lately. PT has mild forgetfulness but family at home has been taking care of him closely.     7/5/19  ECHO last month EF improved to 45-50%. Will not need ICD now. He has LLE pain x 4 days. He has been using Tylenol and ICY HOT for pain relief. Pain started L calf. Is taking lasix 40mg daily. Feels dizzy and lightheaded with standing at times.     Patient feels no chest pain, no sob, no leg swelling, no PND, no palpitation,  no syncope, no CNS symptoms.    Patient has dec exercise tolerance.    Patient is compliant with medications.     ECHO  CONCLUSIONS     1 - Concentric hypertrophy.     2 - Wall motion abnormalities.     3 - Mildly depressed left ventricular systolic function (EF 45-50%).     4 - Impaired LV relaxation, normal LAP (grade 1 diastolic dysfunction).     5 - Normal right ventricular systolic function .     6 - Trivial pericardial effusion.     This document has been electronically    SIGNED BY: Thang Mon MD On: 06/03/2019 13:29    Nuclear Stress  Nuclear Quantitative Functional Analysis:   LVEF: 46 %  LVED Volume: 144 ml  LVES Volume: 91 ml    Impression: NORMAL MYOCARDIAL PERFUSION  1. The perfusion scan is free of evidence for myocardial ischemia or injury.   2. Resting wall motion is physiologic.   3. There is resting LV dysfunction with a reduced ejection fraction of 46 %.   4. The ventricular volumes are normal at rest and stress.   5. The extracardiac distribution of radioactivity is normal.     This document has been electronically    SIGNED BY: Migel Morris MD On: 06/20/2018 15:11  Past Medical History:   Diagnosis Date    Acquired renal cyst of left kidney     Anemia associated with chronic renal failure     CAD (coronary artery disease)     nonobstructive c 9/14    CHF (congestive heart failure)     Chronic immunosuppression with Prograf and MMF 6/18/2015    CKD (chronic kidney disease) stage 3, GFR 30-59 ml/min     Diabetic retinopathy     DM  (diabetes mellitus), type 2 with complications 1994    Edema     End stage kidney disease     s/p transplant, doing well    Gallbladder polyp     Heart failure, diastolic, due to HTN     Hemodialysis status     off since transplant    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     History of colon polyps     HPTH (hyperparathyroidism)     Hyperlipidemia     Hypertension associated with stage 3 chronic kidney disease due to type 2 diabetes mellitus     Obesity     PCO (posterior capsular opacification), left 3/4/2019    Proteinuria     resolved s/p transplant    S/P kidney transplant     Sleep apnea     Type 2 diabetes, uncontrolled, with retinopathy     Type II diabetes mellitus with renal manifestations        Past Surgical History:   Procedure Laterality Date    CARDIAC CATHETERIZATION  2008    normal coronary    COLONOSCOPY N/A 4/5/2018    Performed by [facility]quillo [facility] at [facility] ENDO    Colonoscopy N/A 11/12/2014    Performed by José Luis Kevin MD at [facility] ENDO    HEART CATH-LEFT Left 9/29/2014    Performed by Migel Morris MD at [facility] CATH LAB    INSERTION, CATHETER, VASCULAR Right 7/9/2013    Performed by Javy Vang MD at [facility] CATH LAB    INSERTION, GRAFT, ARTERIOVENOUS Right 7/29/2013    Performed by Grover Cesar MD at [facility] OR    KIDNEY TRANSPLANT      RETINAL LASER PROCEDURE      TRANSPLANT-KIDNEY N/A 6/12/2015    Performed by Carlota Wilson MD at [facility] OR [address] [place] [address]       Social History     Tobacco Use    Smoking status: Never Smoker    Smokeless tobacco: Former User    Tobacco comment: used marijuana since 4088-1604, stopped after started dialysis   Substance Use Topics    Alcohol use: No     Alcohol/week: 0.0 oz    Drug use: No     Comment:         Family History   Problem Relation Age of Onset    Diabetes Mother     Hypertension Mother     Heart failure Mother     Kidney disease Sister         ESRD    Diabetes Sister     Diabetes  "Maternal Grandmother     Cancer Neg Hx        Patient's Medications   New Prescriptions    No medications on file   Previous Medications    ASPIRIN (ECOTRIN) 81 MG EC TABLET    Take 1 tablet by mouth Daily.     BD ULTRA-FINE MINI PEN NEEDLE 31 GAUGE X 3/16" NDLE    Use to inject insulin as needed up to 4 times daily    BISACODYL (DULCOLAX) 5 MG EC TABLET    Take 5 mg by mouth daily as needed for Constipation.    BLOOD SUGAR DIAGNOSTIC STRP    Use to test four times per day    ERGOCALCIFEROL (VITAMIN D2) 50,000 UNIT CAP    Take 1 capsule by mouth every 7 days    FAMOTIDINE (PEPCID) 20 MG TABLET    TAKE ONE TABLET BY MOUTH EVERY EVENING    FINASTERIDE (PROSCAR) 5 MG TABLET    Take 1 tablet (5 mg total) by mouth once daily.    FISH OIL-OMEGA-3 FATTY ACIDS 300-1,000 MG CAPSULE    Take 1 g by mouth.    FISH,BORA,FLAX OILS-OM3,6,9NO1 1,200 MG CAP    Take by mouth.    FLUTICASONE (FLONASE) 50 MCG/ACTUATION NASAL SPRAY    use 2 sprays (100 mcg total) in each nostril once daily    FUROSEMIDE (LASIX) 40 MG TABLET    Take 1 tablet (40 mg total) by mouth once daily.    FUROSEMIDE (LASIX) 80 MG TABLET    Take one- half tablet (40 mg) by mouth 2 (two) times daily.    GLIMEPIRIDE (AMARYL) 4 MG TABLET    Take 1 tablet (4 mg total) by mouth before breakfast.    INSULIN ASPART U-100 (NOVOLOG FLEXPEN U-100 INSULIN) 100 UNIT/ML (3 ML) INPN PEN    Inject 25 Units into the skin 3 (three) times daily with meals.    INSULIN ASPART U-100 (NOVOLOG FLEXPEN U-100 INSULIN) 100 UNIT/ML INPN PEN    Inject 20 Units into the skin 3 (three) times daily with meals.    INSULIN GLARGINE 100 UNITS/ML (3ML) SUBQ PEN    Inject 30 Units into the skin 2 (two) times daily.    KETOCONAZOLE (NIZORAL) 200 MG TAB    Take 0.5 tablets (100 mg total) by mouth once daily.    LANCETS 33 GAUGE MISC    1 Stick by Misc.(Non-Drug; Combo Route) route as directed. Contour lancets. Use to check BG 2-3 times daily.    METFORMIN (GLUCOPHAGE) 500 MG TABLET    Take 1 tablet " "(500 mg total) by mouth 2 (two) times daily with meals.    METOPROLOL SUCCINATE (TOPROL-XL) 25 MG 24 HR TABLET    Take 1 tablet (25 mg total) by mouth once daily.    MULTIVITAMIN (THERA) TABLET    Take 1 tablet by mouth.    MULTIVITAMIN (THERAGRAN) TABLET    Take 1 tablet by mouth once daily.    MULTIVITAMIN CAPSULE    Take 1 capsule by mouth once daily.    MYCOPHENOLATE (CELLCEPT) 250 MG CAP    TAKE THREE CAPSULES BY MOUTH TWICE A DAY    OMEGA-3 FATTY ACIDS-VITAMIN E (FISH OIL) 1,000 MG CAP    Take 1 capsule by mouth once daily.     PEN NEEDLE, DIABETIC (BD INSULIN PEN NEEDLE UF SHORT) 31 GAUGE X 5/16" NDLE    USE TO INJECT INSULIN TWICE A DAY    POLYETHYLENE GLYCOL (GLYCOLAX) 17 GRAM PWPK    Take 17 grams by mouth daily for 7 days.    PREDNISONE (DELTASONE) 5 MG TABLET    Take 1 tablet (5 mg total) by mouth once daily.    ROSUVASTATIN (CRESTOR) 40 MG TAB    TAKE ONE TABLET BY MOUTH EVERY EVENING    SACUBITRIL-VALSARTAN (ENTRESTO) 24-26 MG PER TABLET    Take 1 tablet by mouth 2 (two) times daily.    TACROLIMUS (PROGRAF) 1 MG CAP    Take 4 capsules (4 mg total) by mouth every 12 (twelve) hours.    TAMSULOSIN (FLOMAX) 0.4 MG CAP    Take 1 capsule (0.4 mg total) by mouth once daily.   Modified Medications    No medications on file   Discontinued Medications    No medications on file       Review of Systems   Constitutional: Positive for malaise/fatigue.   HENT: Negative.    Eyes: Negative.    Respiratory: Negative.    Cardiovascular: Positive for leg swelling. Negative for chest pain and palpitations.   Gastrointestinal: Negative.    Genitourinary: Negative.    Musculoskeletal: Positive for back pain.   Skin: Negative.    Neurological: Positive for dizziness.   Endo/Heme/Allergies: Negative.    Psychiatric/Behavioral: Negative.    All 12 systems otherwise negative.      Wt Readings from Last 3 Encounters:   07/05/19 122.3 kg (269 lb 10 oz)   06/18/19 124.8 kg (275 lb 2.2 oz)   06/17/19 124.2 kg (273 lb 13 oz)     Temp " "Readings from Last 3 Encounters:   06/17/19 98 °F (36.7 °C) (Oral)   03/19/19 97.6 °F (36.4 °C) (Tympanic)   01/04/19 98.2 °F (36.8 °C) (Tympanic)     BP Readings from Last 3 Encounters:   07/05/19 128/80   06/18/19 126/80   06/17/19 129/68     Pulse Readings from Last 3 Encounters:   07/05/19 108   06/18/19 94   06/17/19 100       /80 (BP Location: Left arm, Patient Position: Sitting)   Pulse 108   Ht 5' 8.78" (1.747 m)   Wt 122.3 kg (269 lb 10 oz)   BMI 40.07 kg/m²     Objective:   Physical Exam   Constitutional: He is oriented to person, place, and time. He appears well-developed and well-nourished. No distress.   HENT:   Head: Normocephalic and atraumatic.   Nose: Nose normal.   Mouth/Throat: Oropharynx is clear and moist.   Eyes: Conjunctivae and EOM are normal. No scleral icterus.   Neck: Normal range of motion. Neck supple. No JVD present. No thyromegaly present.   Cardiovascular: Normal rate, regular rhythm, S1 normal and S2 normal. Exam reveals no gallop, no S3, no S4 and no friction rub.   No murmur heard.  Pulmonary/Chest: Effort normal and breath sounds normal. No stridor. No respiratory distress. He has no wheezes. He has no rales. He exhibits no tenderness.   Abdominal: Soft. Bowel sounds are normal. He exhibits no distension and no mass. There is no tenderness. There is no rebound.   Genitourinary:   Genitourinary Comments: Deferred   Musculoskeletal: Normal range of motion. He exhibits edema. He exhibits no tenderness or deformity.   Lymphadenopathy:     He has no cervical adenopathy.   Neurological: He is alert and oriented to person, place, and time. He exhibits normal muscle tone. Coordination normal.   Skin: Skin is warm and dry. No rash noted. He is not diaphoretic. No erythema. No pallor.   Psychiatric: He has a normal mood and affect. His behavior is normal. Judgment and thought content normal.   Nursing note and vitals reviewed.      Lab Results   Component Value Date     " 07/03/2019    K 3.1 (L) 07/03/2019     07/03/2019    CO2 28 07/03/2019    BUN 24 (H) 07/03/2019    CREATININE 1.8 (H) 07/03/2019    GLU 64 (L) 07/03/2019    HGBA1C 7.2 (H) 07/03/2019    MG 1.7 06/03/2019    AST 17 06/03/2019    ALT 20 06/03/2019    ALBUMIN 3.4 (L) 06/03/2019    ALBUMIN 3.4 (L) 06/03/2019    PROT 6.4 06/03/2019    BILITOT 0.5 06/03/2019    WBC 8.26 07/03/2019    HGB 13.6 (L) 07/03/2019    HCT 44.7 07/03/2019    HCT 36 06/12/2015    MCV 84 07/03/2019     07/03/2019    INR 1.0 06/18/2015    TSH 0.574 07/03/2019    CHOL 217 (H) 07/03/2019    HDL 52 07/03/2019    LDLCALC 129.2 07/03/2019    TRIG 179 (H) 07/03/2019     (H) 12/04/2018     Assessment:      1. 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, with long-term current use of insulin    2. Coronary artery disease involving native coronary artery of native heart without angina pectoris    3. Hypertension associated with stage 3 chronic kidney disease due to type 2 diabetes mellitus    4. Chronic combined systolic and diastolic congestive heart failure    5. Living-related donor kidney transplant (daughter) - 6/12/15    6. CKD (chronic kidney disease) stage 3, GFR 30-59 ml/min    7. Obstructive sleep apnea syndrome        Plan:   1. Chronic CHF EF 45-50%  - cont lasix, and extra PRN, pt euvolemic   - cont Toprol and Entresto  - will refer to advanced CHF if further exac  - cont low salt diet, fluid restriction    2. HTN   - cont meds for afterload reduction   - low salt diet    3. HLD  - cont statin    4. ESRD s/p Transplant with CKD  - f/u with nephro    5. CAD, non obs  - cont meds  - stress 6/2018 negative    6. MARION  -cont CPAP    7. Obesity  -rec weight loss with diet/exercise     8. LE pain/swelling  - arterial and venous us with MARÍA ELENA ordered     Thank you for allowing me to participate in this patient's care. Please do not hesitate to contact me with any questions or concerns. Consult note has  been forwarded to the referral physician.

## 2019-07-16 ENCOUNTER — CLINICAL SUPPORT (OUTPATIENT)
Dept: CARDIOLOGY | Facility: CLINIC | Age: 64
End: 2019-07-16
Attending: INTERNAL MEDICINE
Payer: COMMERCIAL

## 2019-07-16 ENCOUNTER — TELEPHONE (OUTPATIENT)
Dept: CARDIOLOGY | Facility: CLINIC | Age: 64
End: 2019-07-16

## 2019-07-16 ENCOUNTER — DOCUMENTATION ONLY (OUTPATIENT)
Dept: CARDIOLOGY | Facility: CLINIC | Age: 64
End: 2019-07-16

## 2019-07-16 DIAGNOSIS — M79.606 PAIN OF LOWER EXTREMITY, UNSPECIFIED LATERALITY: ICD-10-CM

## 2019-07-16 DIAGNOSIS — R60.9 EDEMA, UNSPECIFIED TYPE: ICD-10-CM

## 2019-07-16 LAB — VASCULAR ANKLE BRACHIAL INDEX (ABI) RIGHT: 0.98 (ref 0.9–1.2)

## 2019-07-16 PROCEDURE — 93925 LOWER EXTREMITY STUDY: CPT | Mod: S$GLB,,, | Performed by: INTERNAL MEDICINE

## 2019-07-16 PROCEDURE — 93970 EXTREMITY STUDY: CPT | Mod: S$GLB,,, | Performed by: INTERNAL MEDICINE

## 2019-07-16 PROCEDURE — 93922 CAR US ANKLE BRACHIAL INDICES EXT LTD WO STR: ICD-10-PCS | Mod: S$GLB,,, | Performed by: INTERNAL MEDICINE

## 2019-07-16 PROCEDURE — 93970 CAR US DOPPLER VENOUS LEGS BILATERAL: ICD-10-PCS | Mod: S$GLB,,, | Performed by: INTERNAL MEDICINE

## 2019-07-16 PROCEDURE — 93925 CAR US DOPPLER ARTERIAL LEGS BILATERAL: ICD-10-PCS | Mod: S$GLB,,, | Performed by: INTERNAL MEDICINE

## 2019-07-16 PROCEDURE — 93922 UPR/L XTREMITY ART 2 LEVELS: CPT | Mod: S$GLB,,, | Performed by: INTERNAL MEDICINE

## 2019-07-16 NOTE — TELEPHONE ENCOUNTER
Called to patient to give results of abnormal leg studies and medication order for eliquis--left voice message to call our office back at [phone].

## 2019-07-16 NOTE — PROGRESS NOTES
DVT noted on u/s today. Start Eliquis 10 BID x 7 days and Eliquis 5 BID after. Follow up next week. If any breathing issues needs ER eval.    Micah Muhammad MD, [facility]  Cardiovascular Disease  [facility], [place]  [phone] (P)

## 2019-07-16 NOTE — TELEPHONE ENCOUNTER
Called to patient and gave results of leg tests and orders for taking eliquis--patient verbalizes understanding--states already picked up medication from pharmacy--follow up appointment has been scheduled on 7/24/19 at [facility] location

## 2019-07-16 NOTE — TELEPHONE ENCOUNTER
----- Message from Micah Muhammad MD sent at 7/16/2019 10:17 AM CDT -----  Regarding: Schedule visit next week - DVT  Pt with DVT today, start Eliquis and follow up with me next week.

## 2019-07-16 NOTE — PROGRESS NOTES
Prelim of positive venous called to Dr. Muhammad, He called in rx to [facility].  Instructed pt on the medication dosage and informed him Dr. Muhammad wants to see him in one week.  Pt stated that he was going to  the RX and had no questions at this time.

## 2019-07-18 DIAGNOSIS — I50.42 CHRONIC COMBINED SYSTOLIC AND DIASTOLIC CONGESTIVE HEART FAILURE: ICD-10-CM

## 2019-07-23 DIAGNOSIS — I25.10 CORONARY ARTERY DISEASE WITHOUT ANGINA PECTORIS, UNSPECIFIED VESSEL OR LESION TYPE, UNSPECIFIED WHETHER NATIVE OR TRANSPLANTED HEART: Primary | ICD-10-CM

## 2019-07-24 ENCOUNTER — OFFICE VISIT (OUTPATIENT)
Dept: CARDIOLOGY | Facility: CLINIC | Age: 64
End: 2019-07-24
Payer: COMMERCIAL

## 2019-07-24 ENCOUNTER — CLINICAL SUPPORT (OUTPATIENT)
Dept: CARDIOLOGY | Facility: CLINIC | Age: 64
End: 2019-07-24
Payer: COMMERCIAL

## 2019-07-24 VITALS
HEIGHT: 68 IN | DIASTOLIC BLOOD PRESSURE: 74 MMHG | BODY MASS INDEX: 41.37 KG/M2 | SYSTOLIC BLOOD PRESSURE: 124 MMHG | HEART RATE: 93 BPM | WEIGHT: 273 LBS

## 2019-07-24 DIAGNOSIS — E11.21 TYPE 2 DIABETES MELLITUS WITH DIABETIC NEPHROPATHY, UNSPECIFIED WHETHER LONG TERM INSULIN USE: ICD-10-CM

## 2019-07-24 DIAGNOSIS — E11.22 HYPERTENSION ASSOCIATED WITH STAGE 3 CHRONIC KIDNEY DISEASE DUE TO TYPE 2 DIABETES MELLITUS: ICD-10-CM

## 2019-07-24 DIAGNOSIS — I82.492 ACUTE DEEP VEIN THROMBOSIS (DVT) OF OTHER SPECIFIED VEIN OF LEFT LOWER EXTREMITY: Primary | ICD-10-CM

## 2019-07-24 DIAGNOSIS — I12.9 HYPERTENSION ASSOCIATED WITH STAGE 3 CHRONIC KIDNEY DISEASE DUE TO TYPE 2 DIABETES MELLITUS: ICD-10-CM

## 2019-07-24 DIAGNOSIS — Z94.0 KIDNEY TRANSPLANT STATUS, LIVING RELATED DONOR: Chronic | ICD-10-CM

## 2019-07-24 DIAGNOSIS — N18.30 CKD (CHRONIC KIDNEY DISEASE) STAGE 3, GFR 30-59 ML/MIN: ICD-10-CM

## 2019-07-24 DIAGNOSIS — G47.33 OBSTRUCTIVE SLEEP APNEA SYNDROME: ICD-10-CM

## 2019-07-24 DIAGNOSIS — I50.42 CHRONIC COMBINED SYSTOLIC AND DIASTOLIC CONGESTIVE HEART FAILURE: ICD-10-CM

## 2019-07-24 DIAGNOSIS — E66.01 CLASS 3 SEVERE OBESITY DUE TO EXCESS CALORIES WITH SERIOUS COMORBIDITY AND BODY MASS INDEX (BMI) OF 40.0 TO 44.9 IN ADULT: ICD-10-CM

## 2019-07-24 DIAGNOSIS — N18.30 HYPERTENSION ASSOCIATED WITH STAGE 3 CHRONIC KIDNEY DISEASE DUE TO TYPE 2 DIABETES MELLITUS: ICD-10-CM

## 2019-07-24 DIAGNOSIS — I25.10 CORONARY ARTERY DISEASE INVOLVING NATIVE CORONARY ARTERY OF NATIVE HEART WITHOUT ANGINA PECTORIS: ICD-10-CM

## 2019-07-24 DIAGNOSIS — I25.10 CORONARY ARTERY DISEASE WITHOUT ANGINA PECTORIS, UNSPECIFIED VESSEL OR LESION TYPE, UNSPECIFIED WHETHER NATIVE OR TRANSPLANTED HEART: ICD-10-CM

## 2019-07-24 PROBLEM — I82.409 DEEP VEIN THROMBOSIS (DVT) OF LOWER EXTREMITY: Status: ACTIVE | Noted: 2019-07-24

## 2019-07-24 PROCEDURE — 99999 PR PBB SHADOW E&M-EST. PATIENT-LVL III: ICD-10-PCS | Mod: PBBFAC,,, | Performed by: INTERNAL MEDICINE

## 2019-07-24 PROCEDURE — 3045F PR MOST RECENT HEMOGLOBIN A1C LEVEL 7.0-9.0%: CPT | Mod: CPTII,S$GLB,, | Performed by: INTERNAL MEDICINE

## 2019-07-24 PROCEDURE — 99214 PR OFFICE/OUTPT VISIT, EST, LEVL IV, 30-39 MIN: ICD-10-PCS | Mod: S$GLB,,, | Performed by: INTERNAL MEDICINE

## 2019-07-24 PROCEDURE — 99999 PR PBB SHADOW E&M-EST. PATIENT-LVL III: CPT | Mod: PBBFAC,,, | Performed by: INTERNAL MEDICINE

## 2019-07-24 PROCEDURE — 3008F PR BODY MASS INDEX (BMI) DOCUMENTED: ICD-10-PCS | Mod: CPTII,S$GLB,, | Performed by: INTERNAL MEDICINE

## 2019-07-24 PROCEDURE — 1101F PR PT FALLS ASSESS DOC 0-1 FALLS W/OUT INJ PAST YR: ICD-10-PCS | Mod: CPTII,S$GLB,, | Performed by: INTERNAL MEDICINE

## 2019-07-24 PROCEDURE — 93000 ELECTROCARDIOGRAM COMPLETE: CPT | Mod: S$GLB,,, | Performed by: INTERNAL MEDICINE

## 2019-07-24 PROCEDURE — 3078F DIAST BP <80 MM HG: CPT | Mod: CPTII,S$GLB,, | Performed by: INTERNAL MEDICINE

## 2019-07-24 PROCEDURE — 3074F SYST BP LT 130 MM HG: CPT | Mod: CPTII,S$GLB,, | Performed by: INTERNAL MEDICINE

## 2019-07-24 PROCEDURE — 3045F PR MOST RECENT HEMOGLOBIN A1C LEVEL 7.0-9.0%: ICD-10-PCS | Mod: CPTII,S$GLB,, | Performed by: INTERNAL MEDICINE

## 2019-07-24 PROCEDURE — 3008F BODY MASS INDEX DOCD: CPT | Mod: CPTII,S$GLB,, | Performed by: INTERNAL MEDICINE

## 2019-07-24 PROCEDURE — 1101F PT FALLS ASSESS-DOCD LE1/YR: CPT | Mod: CPTII,S$GLB,, | Performed by: INTERNAL MEDICINE

## 2019-07-24 PROCEDURE — 93000 EKG 12-LEAD: ICD-10-PCS | Mod: S$GLB,,, | Performed by: INTERNAL MEDICINE

## 2019-07-24 PROCEDURE — 3078F PR MOST RECENT DIASTOLIC BLOOD PRESSURE < 80 MM HG: ICD-10-PCS | Mod: CPTII,S$GLB,, | Performed by: INTERNAL MEDICINE

## 2019-07-24 PROCEDURE — 3074F PR MOST RECENT SYSTOLIC BLOOD PRESSURE < 130 MM HG: ICD-10-PCS | Mod: CPTII,S$GLB,, | Performed by: INTERNAL MEDICINE

## 2019-07-24 PROCEDURE — 99214 OFFICE O/P EST MOD 30 MIN: CPT | Mod: S$GLB,,, | Performed by: INTERNAL MEDICINE

## 2019-07-24 NOTE — PATIENT INSTRUCTIONS
Deep Vein Thrombosis (DVT)    Deep vein thrombosis (DVT) occurs when a blood clot (thrombus) forms in a deep vein. This happens most often in the leg. It can also happen in the arms or other parts of the body. A part of the clot called an embolus can break off and travel to the lungs. When this happens, its called a pulmonary embolism (PE). PE is a medical emergency. It can cut off blood flow and lead to death. Both DVT and PE are closely related. Together, they are often referred to by the term venous thromboembolism (VTE).  Risk factors for DVT  Anything that slows blood flow, injures the lining of a vein, or increases blood clotting can make you more prone to having DVT. This includes the following:  · Long periods without movement (such as when sitting for many hours at a time or when recovering from major surgery or illness)  · Estrogen (female hormone) therapy, such as hormone replacement therapy (HRT) or oral contraceptives  · Fractured hip or leg  · Major surgery or joint replacement  · Major trauma or spinal cord injury  · Cancer  · Family history  · Excess weight or obesity  · Smoking  · Older age  Symptoms  DVT does not always cause symptoms. When symptoms do occur, they may appear around the site of the DVT, such as in the leg. Possible symptoms include:  · Swelling  · Pain  · Warmth  · Redness  · Tenderness  Home care  · You were likely prescribed blood thinners (anticoagulants). They may be given as pills (oral) or shots (injections). Follow all instructions when using these medicines. Note: Do not take blood thinners with other medicines, herbal remedies, or supplements without talking to your provider first. Certain medicines or products can affect how blood thinners work.  · Follow your providers instructions about activity and rest.  · If support or compression stockings are prescribed, wear them as directed. These may help improve blood flow in the legs.  · When sitting or lying down, move  your ankles, toes and knees often. This may also help improve blood flow in the legs.  Follow-up care  Follow up with your healthcare provider, or as advised. If imaging tests were done, they may need further review by a doctor. You will be told of any new findings that may affect your care.  When to seek medical advice  Call your healthcare provider right away if any of these occur:  · New or increased swelling, pain, tenderness, warmth, or redness, in the leg, arm, or other area  · Blood in the urine  · Bleeding with bowel movements  Call 911  Call 911 right away if any of these occur:  · Bleeding from the nose, gums, a cut, or vagina  · Heavy or uncontrolled bleeding  · Trouble breathing  · Chest pain or discomfort that worsens with deep breathing or coughing  · Coughing (may cough up blood)  · Fast heartbeat  · Sweating  · Anxiety  · Lightheadedness, dizziness, or fainting  Date Last Reviewed: 9/21/2015  © 1946-2289 [employer]. [address] [place] [address], [place] [address]. All rights reserved. This information is not intended as a substitute for professional medical care. Always follow your healthcare professional's instructions.

## 2019-07-24 NOTE — PROGRESS NOTES
Subjective:   Patient ID:  Mitch Whittaker is a 63 y.o. male who presents for cardiac consult of Follow-up and Dizziness      HPI  The patient came in today for cardiac consult of Follow-up and Dizziness      Mitch Whittaker is a 63 y.o. male with current medical conditions non obs CAD, DVT on Eliquis, HFrEF 25-30%, CKD, DM, MARION, HTN, HLD, ESRD s/p renal transplant presents for follow up CV eval.     3/15/19  Pt of Dr. Morris, last seen in clinic 2016. Pt presents after recent admission for CHF at [facility] -   Patient is a 63-year-old gentleman who presented to the hospital with shortness of breath. He was found to have pulmonary edema on chest x-ray and bilateral pedal edema. He was admitted for treatment of acute CHF. Echo showed reduced ejection fraction at 25-30%. He was treated with IV Lasix 80 mg twice daily that has been transitioned to Lasix p.o. Patient is not euvolemic and his creatinine is trending up for this reason I am cutting back on Lasix to 40 mg twice daily. Can follow-up with cardiology in 3 days. He sees one [facility]. He was seen by Dr. Omayra Esparza from [facility]. Recommendation is to start him on Entresto once his renal functions stabilize.Pt had edema and SOB while in bed and then went to hospital. Prior to admission he was on lasix PRN. He will see Dr. Pacheco for nephro eval, transplant nephro Dr. Dejesus.  Has been feeling good on lasix 40 BID, has been walking well overall. Does not have CPAP machine.     5/13/19  He was started on Entresto after last visit as renal function improved/stabilized. Has seen Dr. Mason recently started on CPAP again. Toprol dec to 25 mg due to hypotension. Discussed will repeat echo in 1 month to evaluate LV function, will refer for ICD if EF remains low. He woke up with right sided back pain. He has been using CPAP for about a little over a week. Has been doing well lately overall, BP well controlled, no STEELE/LE lately. PT has mild forgetfulness but  family at home has been taking care of him closely.     7/5/19  ECHO last month EF improved to 45-50%. Will not need ICD now. He has LLE pain x 4 days. He has been using Tylenol and ICY HOT for pain relief. Pain started L calf. Is taking lasix 40mg daily. Feels dizzy and lightheaded with standing at times.     7/24/19  Last week - DVT noted on u/s. Started Eliquis 10 BID x 7 days and Eliquis 5 BID after. NO bleeding issues lately with Eliquis, had minor blood after insulin shot which resolved with pressure. He has been walking more lately. Late June he had leg pain and was not walking much since, he has a habit of sitting down for a long period of time. He is on Eliquis 5 BID dosing now.     Patient feels no chest pain, no sob, no leg swelling, no PND, no palpitation,  no syncope, no CNS symptoms.    Patient has dec exercise tolerance.    Patient is compliant with medications.    ECG - NSR, sinus arrhythmia, inferolat TWI     ECHO : 06/03/2019   CONCLUSIONS     1 - Concentric hypertrophy.     2 - Wall motion abnormalities.     3 - Mildly depressed left ventricular systolic function (EF 45-50%).     4 - Impaired LV relaxation, normal LAP (grade 1 diastolic dysfunction).     5 - Normal right ventricular systolic function .     6 - Trivial pericardial effusion.         Nuclear Stress 06/20/2018   Nuclear Quantitative Functional Analysis:   LVEF: 46 %  LVED Volume: 144 ml  LVES Volume: 91 ml    Impression: NORMAL MYOCARDIAL PERFUSION  1. The perfusion scan is free of evidence for myocardial ischemia or injury.   2. Resting wall motion is physiologic.   3. There is resting LV dysfunction with a reduced ejection fraction of 46 %.   4. The ventricular volumes are normal at rest and stress.   5. The extracardiac distribution of radioactivity is normal.       Past Medical History:   Diagnosis Date    Acquired renal cyst of left kidney     Anemia associated with chronic renal failure     CAD (coronary artery disease)      nonobstructive c 9/14    CHF (congestive heart failure)     Chronic immunosuppression with Prograf and MMF 6/18/2015    CKD (chronic kidney disease) stage 3, GFR 30-59 ml/min     Diabetic retinopathy     DM (diabetes mellitus), type 2 with complications 1994    Edema     End stage kidney disease     s/p transplant, doing well    Gallbladder polyp     Heart failure, diastolic, due to HTN     Hemodialysis status     off since transplant    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     History of colon polyps     HPTH (hyperparathyroidism)     Hyperlipidemia     Hypertension associated with stage 3 chronic kidney disease due to type 2 diabetes mellitus     Obesity     PCO (posterior capsular opacification), left 3/4/2019    Proteinuria     resolved s/p transplant    S/P kidney transplant     Sleep apnea     Type 2 diabetes, uncontrolled, with retinopathy     Type II diabetes mellitus with renal manifestations        Past Surgical History:   Procedure Laterality Date    CARDIAC CATHETERIZATION  2008    normal coronary    COLONOSCOPY N/A 4/5/2018    Performed by [facility] Ronquillo [facility] at [facility] ENDO    Colonoscopy N/A 11/12/2014    Performed by José Luis Kevin MD at [facility] ENDO    HEART CATH-LEFT Left 9/29/2014    Performed by Migel Morris MD at [facility] CATH LAB    INSERTION, CATHETER, VASCULAR Right 7/9/2013    Performed by Javy Vang MD at [facility] CATH LAB    INSERTION, GRAFT, ARTERIOVENOUS Right 7/29/2013    Performed by Grover Cesar MD at [facility] OR    KIDNEY TRANSPLANT      RETINAL LASER PROCEDURE      TRANSPLANT-KIDNEY N/A 6/12/2015    Performed by Carlota Wilson MD at [facility] OR [address] [place] [address]       Social History     Tobacco Use    Smoking status: Never Smoker    Smokeless tobacco: Former User    Tobacco comment: used marijuana since [id number], stopped after started dialysis   Substance Use Topics    Alcohol use: No     Alcohol/week: 0.0 oz    Drug use:  "No     Comment:         Family History   Problem Relation Age of Onset    Diabetes Mother     Hypertension Mother     Heart failure Mother     Kidney disease Sister         ESRD    Diabetes Sister     Diabetes Maternal Grandmother     Cancer Neg Hx        Patient's Medications   New Prescriptions    No medications on file   Previous Medications    APIXABAN (ELIQUIS) 5 MG TAB    Take 1 tablet (5 mg total) by mouth 2 (two) times daily.    ASPIRIN (ECOTRIN) 81 MG EC TABLET    Take 1 tablet by mouth Daily.     BD ULTRA-FINE MINI PEN NEEDLE 31 GAUGE X 3/16" NDLE    Use to inject insulin as needed up to 4 times daily    BISACODYL (DULCOLAX) 5 MG EC TABLET    Take 5 mg by mouth daily as needed for Constipation.    BLOOD SUGAR DIAGNOSTIC STRP    Use to test four times per day    ERGOCALCIFEROL (VITAMIN D2) 50,000 UNIT CAP    Take 1 capsule by mouth every 7 days    FAMOTIDINE (PEPCID) 20 MG TABLET    TAKE ONE TABLET BY MOUTH EVERY EVENING    FINASTERIDE (PROSCAR) 5 MG TABLET    Take 1 tablet (5 mg total) by mouth once daily.    FISH OIL-OMEGA-3 FATTY ACIDS 300-1,000 MG CAPSULE    Take 1 g by mouth.    FISH,BORA,FLAX OILS-OM3,6,9NO1 1,200 MG CAP    Take by mouth.    FLUTICASONE (FLONASE) 50 MCG/ACTUATION NASAL SPRAY    use 2 sprays (100 mcg total) in each nostril once daily    FUROSEMIDE (LASIX) 40 MG TABLET    Take 1 tablet (40 mg total) by mouth once daily.    FUROSEMIDE (LASIX) 80 MG TABLET    Take one- half tablet (40 mg) by mouth 2 (two) times daily.    GLIMEPIRIDE (AMARYL) 4 MG TABLET    Take 1 tablet (4 mg total) by mouth before breakfast.    INSULIN ASPART U-100 (NOVOLOG FLEXPEN U-100 INSULIN) 100 UNIT/ML (3 ML) INPN PEN    Inject 25 Units into the skin 3 (three) times daily with meals.    INSULIN ASPART U-100 (NOVOLOG FLEXPEN U-100 INSULIN) 100 UNIT/ML INPN PEN    Inject 20 Units into the skin 3 (three) times daily with meals.    INSULIN GLARGINE 100 UNITS/ML (3ML) SUBQ PEN    Inject 30 Units into the skin 2 " "(two) times daily.    KETOCONAZOLE (NIZORAL) 200 MG TAB    Take 0.5 tablets (100 mg total) by mouth once daily.    LANCETS 33 GAUGE MISC    1 Stick by Misc.(Non-Drug; Combo Route) route as directed. Contour lancets. Use to check BG 2-3 times daily.    METFORMIN (GLUCOPHAGE) 500 MG TABLET    Take 1 tablet (500 mg total) by mouth 2 (two) times daily with meals.    METOPROLOL SUCCINATE (TOPROL-XL) 25 MG 24 HR TABLET    Take 1 tablet (25 mg total) by mouth once daily.    MULTIVITAMIN (THERA) TABLET    Take 1 tablet by mouth.    MULTIVITAMIN (THERAGRAN) TABLET    Take 1 tablet by mouth once daily.    MULTIVITAMIN CAPSULE    Take 1 capsule by mouth once daily.    MYCOPHENOLATE (CELLCEPT) 250 MG CAP    TAKE THREE CAPSULES BY MOUTH TWICE A DAY    OMEGA-3 FATTY ACIDS-VITAMIN E (FISH OIL) 1,000 MG CAP    Take 1 capsule by mouth once daily.     PEN NEEDLE, DIABETIC (BD INSULIN PEN NEEDLE UF SHORT) 31 GAUGE X 5/16" NDLE    USE TO INJECT INSULIN TWICE A DAY    POLYETHYLENE GLYCOL (GLYCOLAX) 17 GRAM PWPK    Take 17 grams by mouth daily for 7 days.    PREDNISONE (DELTASONE) 5 MG TABLET    Take 1 tablet (5 mg total) by mouth once daily.    ROSUVASTATIN (CRESTOR) 40 MG TAB    TAKE ONE TABLET BY MOUTH EVERY EVENING    SACUBITRIL-VALSARTAN (ENTRESTO) 24-26 MG PER TABLET    Take 1 tablet by mouth 2 (two) times daily.    TACROLIMUS (PROGRAF) 1 MG CAP    Take 4 capsules (4 mg total) by mouth every 12 (twelve) hours.    TAMSULOSIN (FLOMAX) 0.4 MG CAP    Take 1 capsule (0.4 mg total) by mouth once daily.   Modified Medications    No medications on file   Discontinued Medications    APIXABAN (ELIQUIS) 5 MG TAB    Take 2 tablets (10 mg total) by mouth 2 (two) times daily. for 7 days       Review of Systems   Constitutional: Positive for malaise/fatigue.   HENT: Negative.    Eyes: Negative.    Respiratory: Negative.    Cardiovascular: Positive for leg swelling. Negative for chest pain and palpitations.   Gastrointestinal: Negative.  " "  Genitourinary: Negative.    Musculoskeletal: Positive for back pain.   Skin: Negative.    Neurological: Positive for dizziness.   Endo/Heme/Allergies: Negative.    Psychiatric/Behavioral: Negative.    All 12 systems otherwise negative.      Wt Readings from Last 3 Encounters:   07/24/19 123.8 kg (273 lb)   07/05/19 122.3 kg (269 lb 10 oz)   06/18/19 124.8 kg (275 lb 2.2 oz)     Temp Readings from Last 3 Encounters:   06/17/19 98 °F (36.7 °C) (Oral)   03/19/19 97.6 °F (36.4 °C) (Tympanic)   01/04/19 98.2 °F (36.8 °C) (Tympanic)     BP Readings from Last 3 Encounters:   07/24/19 124/74   07/05/19 128/80   06/18/19 126/80     Pulse Readings from Last 3 Encounters:   07/24/19 93   07/05/19 108   06/18/19 94       /74 (BP Location: Left arm, Patient Position: Sitting, BP Method: Medium (Manual))   Pulse 93   Ht 5' 8" (1.727 m)   Wt 123.8 kg (273 lb)   BMI 41.51 kg/m²     Objective:   Physical Exam   Constitutional: He is oriented to person, place, and time. He appears well-developed and well-nourished. No distress.   HENT:   Head: Normocephalic and atraumatic.   Nose: Nose normal.   Mouth/Throat: Oropharynx is clear and moist.   Eyes: Conjunctivae and EOM are normal. No scleral icterus.   Neck: Normal range of motion. Neck supple. No JVD present. No thyromegaly present.   Cardiovascular: Normal rate, regular rhythm, S1 normal and S2 normal. Exam reveals no gallop, no S3, no S4 and no friction rub.   No murmur heard.  Pulmonary/Chest: Effort normal and breath sounds normal. No stridor. No respiratory distress. He has no wheezes. He has no rales. He exhibits no tenderness.   Abdominal: Soft. Bowel sounds are normal. He exhibits no distension and no mass. There is no tenderness. There is no rebound.   Genitourinary:   Genitourinary Comments: Deferred   Musculoskeletal: Normal range of motion. He exhibits edema. He exhibits no tenderness or deformity.   Lymphadenopathy:     He has no cervical adenopathy. "   Neurological: He is alert and oriented to person, place, and time. He exhibits normal muscle tone. Coordination normal.   Skin: Skin is warm and dry. No rash noted. He is not diaphoretic. No erythema. No pallor.   Psychiatric: He has a normal mood and affect. His behavior is normal. Judgment and thought content normal.   Nursing note and vitals reviewed.      Lab Results   Component Value Date     07/03/2019    K 3.1 (L) 07/03/2019     07/03/2019    CO2 28 07/03/2019    BUN 24 (H) 07/03/2019    CREATININE 1.8 (H) 07/03/2019    GLU 64 (L) 07/03/2019    HGBA1C 7.2 (H) 07/03/2019    MG 1.7 06/03/2019    AST 17 06/03/2019    ALT 20 06/03/2019    ALBUMIN 3.4 (L) 06/03/2019    ALBUMIN 3.4 (L) 06/03/2019    PROT 6.4 06/03/2019    BILITOT 0.5 06/03/2019    WBC 8.26 07/03/2019    HGB 13.6 (L) 07/03/2019    HCT 44.7 07/03/2019    HCT 36 06/12/2015    MCV 84 07/03/2019     07/03/2019    INR 1.0 06/18/2015    TSH 0.574 07/03/2019    CHOL 217 (H) 07/03/2019    HDL 52 07/03/2019    LDLCALC 129.2 07/03/2019    TRIG 179 (H) 07/03/2019     (H) 12/04/2018     Assessment:      1. Acute deep vein thrombosis (DVT) of other specified vein of left lower extremity    2. Coronary artery disease involving native coronary artery of native heart without angina pectoris    3. Hypertension associated with stage 3 chronic kidney disease due to type 2 diabetes mellitus    4. Chronic combined systolic and diastolic congestive heart failure    5. Living-related donor kidney transplant (daughter) - 6/12/15    6. CKD (chronic kidney disease) stage 3, GFR 30-59 ml/min    7. Type 2 diabetes mellitus with diabetic nephropathy, unspecified whether long term insulin use    8. Obstructive sleep apnea syndrome    9. Class 3 severe obesity due to excess calories with serious comorbidity and body mass index (BMI) of 40.0 to 44.9 in adult        Plan:   1. Chronic CHF EF 45-50%  - cont lasix, and extra PRN, pt euvolemic   - cont  Toprol and Entresto  - will refer to advanced CHF if further exac  - cont low salt diet, fluid restriction    2. HTN   - cont meds for afterload reduction   - low salt diet    3. HLD  - cont statin    4. ESRD s/p Transplant with CKD  - f/u with nephro    5. CAD, non obs  - cont meds  - stress 6/2018 negative    6. MARION  -cont CPAP    7. Obesity  -rec weight loss with diet/exercise     8. Acute DVT  - cont Eliquis 5 BID  - will repeat u/s in 3 months    Thank you for allowing me to participate in this patient's care. Please do not hesitate to contact me with any questions or concerns. Consult note has been forwarded to the referral physician.

## 2019-09-13 RX ORDER — ERGOCALCIFEROL 1.25 MG/1
CAPSULE ORAL
Qty: 12 CAPSULE | Refills: 0 | Status: SHIPPED | OUTPATIENT
Start: 2019-09-13 | End: 2019-12-10 | Stop reason: SDUPTHER

## 2019-09-20 ENCOUNTER — OFFICE VISIT (OUTPATIENT)
Dept: OPHTHALMOLOGY | Facility: CLINIC | Age: 64
End: 2019-09-20
Payer: COMMERCIAL

## 2019-09-20 DIAGNOSIS — E11.3553 TYPE 2 DIABETES MELLITUS WITH STABLE PROLIFERATIVE RETINOPATHY OF BOTH EYES, WITH LONG-TERM CURRENT USE OF INSULIN: Primary | ICD-10-CM

## 2019-09-20 DIAGNOSIS — H35.373 EPIRETINAL MEMBRANE (ERM) OF BOTH EYES: ICD-10-CM

## 2019-09-20 DIAGNOSIS — Z79.4 TYPE 2 DIABETES MELLITUS WITH STABLE PROLIFERATIVE RETINOPATHY OF BOTH EYES, WITH LONG-TERM CURRENT USE OF INSULIN: Primary | ICD-10-CM

## 2019-09-20 PROCEDURE — 99999 PR PBB SHADOW E&M-EST. PATIENT-LVL III: CPT | Mod: PBBFAC,,, | Performed by: OPHTHALMOLOGY

## 2019-09-20 PROCEDURE — 92014 PR EYE EXAM, EST PATIENT,COMPREHESV: ICD-10-PCS | Mod: S$GLB,,, | Performed by: OPHTHALMOLOGY

## 2019-09-20 PROCEDURE — 99999 PR PBB SHADOW E&M-EST. PATIENT-LVL III: ICD-10-PCS | Mod: PBBFAC,,, | Performed by: OPHTHALMOLOGY

## 2019-09-20 PROCEDURE — 92014 COMPRE OPH EXAM EST PT 1/>: CPT | Mod: S$GLB,,, | Performed by: OPHTHALMOLOGY

## 2019-09-20 NOTE — PROGRESS NOTES
===============================  Mitch Whittaker,   63 y.o. male   Last visit [facility]: :3/29/2019   Last visit eye dept. 3/29/2019  [facility]:  Uncorrected distance visual acuity was 20/20 in the right eye and 20/20 in the left eye.  Tonometry     Tonometry (Applanation, 11:25 AM)       Right Left    Pressure 14 13               Not recorded         Not recorded         Not recorded        Chief Complaint   Patient presents with    Diabetic Eye Exam     6 mos dm eye exam, pt states he has floaters os since yag          ________________  9/20/2019  HPI     Diabetic Eye Exam      Additional comments: 6 mos dm eye exam, pt states he has floaters os   since yag              Comments     DM since approx 1996  PDR S/P PRP OU  Old Focal OU  Old OS TRD  ERM OU  PCIOL OU w/YAG OS 3/21/19  KIDNEY TRANSPLANT 6/12/15          Last edited by RICHMOND White on 9/20/2019 11:17 AM. (History)      Problem List Items Addressed This Visit        Eye/Vision problems    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, with long-term current use of insulin - Primary    Overview     DM since 1996  H/o PRP and Focal OU  OS TRD  H/O Kidney transplant 6/12/15  A1C 10.2 2/17         Epiretinal membrane (ERM) of both eyes          .vh os  Pt. Prefers to watch  rtc 4-5 months       ===========================

## 2019-09-22 DIAGNOSIS — Z94.0 KIDNEY REPLACED BY TRANSPLANT: ICD-10-CM

## 2019-09-23 ENCOUNTER — CLINICAL SUPPORT (OUTPATIENT)
Dept: CARDIOLOGY | Facility: CLINIC | Age: 64
End: 2019-09-23
Attending: INTERNAL MEDICINE
Payer: COMMERCIAL

## 2019-09-23 PROCEDURE — 93970 CAR US DOPPLER VENOUS LEGS BILATERAL: ICD-10-PCS | Mod: S$GLB,,, | Performed by: INTERNAL MEDICINE

## 2019-09-23 PROCEDURE — 93970 EXTREMITY STUDY: CPT | Mod: S$GLB,,, | Performed by: INTERNAL MEDICINE

## 2019-09-23 RX ORDER — PEN NEEDLE, DIABETIC 31 GX5/16"
NEEDLE, DISPOSABLE MISCELLANEOUS
Qty: 100 EACH | Refills: 3 | Status: SHIPPED | OUTPATIENT
Start: 2019-09-23 | End: 2021-03-17 | Stop reason: SDUPTHER

## 2019-09-23 RX ORDER — MYCOPHENOLATE MOFETIL 250 MG/1
750 CAPSULE ORAL 2 TIMES DAILY
Qty: 180 CAPSULE | Refills: 11 | Status: SHIPPED | OUTPATIENT
Start: 2019-09-23 | End: 2020-09-11 | Stop reason: SDUPTHER

## 2019-09-26 ENCOUNTER — OFFICE VISIT (OUTPATIENT)
Dept: INTERNAL MEDICINE | Facility: CLINIC | Age: 64
End: 2019-09-26
Payer: COMMERCIAL

## 2019-09-26 VITALS
HEIGHT: 68 IN | TEMPERATURE: 98 F | DIASTOLIC BLOOD PRESSURE: 60 MMHG | WEIGHT: 279.56 LBS | SYSTOLIC BLOOD PRESSURE: 100 MMHG | OXYGEN SATURATION: 96 % | BODY MASS INDEX: 42.37 KG/M2 | HEART RATE: 84 BPM

## 2019-09-26 DIAGNOSIS — I82.492 ACUTE DEEP VEIN THROMBOSIS (DVT) OF OTHER SPECIFIED VEIN OF LEFT LOWER EXTREMITY: ICD-10-CM

## 2019-09-26 DIAGNOSIS — N28.1 ACQUIRED RENAL CYST OF LEFT KIDNEY: ICD-10-CM

## 2019-09-26 DIAGNOSIS — N18.9 ANEMIA ASSOCIATED WITH CHRONIC RENAL FAILURE: ICD-10-CM

## 2019-09-26 DIAGNOSIS — E11.42 DIABETIC PERIPHERAL NEUROPATHY: ICD-10-CM

## 2019-09-26 DIAGNOSIS — E11.8 TYPE 2 DIABETES MELLITUS WITH COMPLICATION, WITH LONG-TERM CURRENT USE OF INSULIN: ICD-10-CM

## 2019-09-26 DIAGNOSIS — Z79.4 TYPE 2 DIABETES MELLITUS WITH COMPLICATION, WITH LONG-TERM CURRENT USE OF INSULIN: ICD-10-CM

## 2019-09-26 DIAGNOSIS — N25.81 SECONDARY HYPERPARATHYROIDISM OF RENAL ORIGIN: ICD-10-CM

## 2019-09-26 DIAGNOSIS — Z29.89 PROPHYLACTIC IMMUNOTHERAPY: Chronic | ICD-10-CM

## 2019-09-26 DIAGNOSIS — Z00.00 ROUTINE GENERAL MEDICAL EXAMINATION AT A HEALTH CARE FACILITY: Primary | ICD-10-CM

## 2019-09-26 DIAGNOSIS — G47.33 OBSTRUCTIVE SLEEP APNEA SYNDROME: ICD-10-CM

## 2019-09-26 DIAGNOSIS — E11.21 TYPE 2 DIABETES MELLITUS WITH DIABETIC NEPHROPATHY, UNSPECIFIED WHETHER LONG TERM INSULIN USE: ICD-10-CM

## 2019-09-26 DIAGNOSIS — Z94.0 KIDNEY TRANSPLANT STATUS, LIVING RELATED DONOR: Chronic | ICD-10-CM

## 2019-09-26 DIAGNOSIS — Z86.010 HISTORY OF COLON POLYPS: ICD-10-CM

## 2019-09-26 DIAGNOSIS — E11.3553 TYPE 2 DIABETES MELLITUS WITH STABLE PROLIFERATIVE RETINOPATHY OF BOTH EYES, WITH LONG-TERM CURRENT USE OF INSULIN: ICD-10-CM

## 2019-09-26 DIAGNOSIS — D63.1 ANEMIA ASSOCIATED WITH CHRONIC RENAL FAILURE: ICD-10-CM

## 2019-09-26 DIAGNOSIS — E11.22 HYPERTENSION ASSOCIATED WITH STAGE 3 CHRONIC KIDNEY DISEASE DUE TO TYPE 2 DIABETES MELLITUS: ICD-10-CM

## 2019-09-26 DIAGNOSIS — R76.8 HEPATITIS C ANTIBODY POSITIVE IN BLOOD: ICD-10-CM

## 2019-09-26 DIAGNOSIS — I25.10 CORONARY ARTERY DISEASE INVOLVING NATIVE CORONARY ARTERY OF NATIVE HEART WITHOUT ANGINA PECTORIS: ICD-10-CM

## 2019-09-26 DIAGNOSIS — N20.0 NEPHROLITHIASIS: ICD-10-CM

## 2019-09-26 DIAGNOSIS — N18.30 HYPERTENSION ASSOCIATED WITH STAGE 3 CHRONIC KIDNEY DISEASE DUE TO TYPE 2 DIABETES MELLITUS: ICD-10-CM

## 2019-09-26 DIAGNOSIS — Z79.4 TYPE 2 DIABETES MELLITUS WITH STABLE PROLIFERATIVE RETINOPATHY OF BOTH EYES, WITH LONG-TERM CURRENT USE OF INSULIN: ICD-10-CM

## 2019-09-26 DIAGNOSIS — I50.42 CHRONIC COMBINED SYSTOLIC AND DIASTOLIC CONGESTIVE HEART FAILURE: ICD-10-CM

## 2019-09-26 DIAGNOSIS — N18.30 CKD (CHRONIC KIDNEY DISEASE) STAGE 3, GFR 30-59 ML/MIN: ICD-10-CM

## 2019-09-26 DIAGNOSIS — I12.9 HYPERTENSION ASSOCIATED WITH STAGE 3 CHRONIC KIDNEY DISEASE DUE TO TYPE 2 DIABETES MELLITUS: ICD-10-CM

## 2019-09-26 PROCEDURE — 99397 PR PREVENTIVE VISIT,EST,65 & OVER: ICD-10-PCS | Mod: 25,S$GLB,, | Performed by: INTERNAL MEDICINE

## 2019-09-26 PROCEDURE — 99999 PR PBB SHADOW E&M-EST. PATIENT-LVL III: ICD-10-PCS | Mod: PBBFAC,,, | Performed by: INTERNAL MEDICINE

## 2019-09-26 PROCEDURE — 99999 PR PBB SHADOW E&M-EST. PATIENT-LVL III: CPT | Mod: PBBFAC,,, | Performed by: INTERNAL MEDICINE

## 2019-09-26 PROCEDURE — 90472 PNEUMOCOCCAL CONJUGATE VACCINE 13-VALENT LESS THAN 5YO & GREATER THAN: ICD-10-PCS | Mod: S$GLB,,, | Performed by: INTERNAL MEDICINE

## 2019-09-26 PROCEDURE — 3078F DIAST BP <80 MM HG: CPT | Mod: CPTII,S$GLB,, | Performed by: INTERNAL MEDICINE

## 2019-09-26 PROCEDURE — 90471 IMMUNIZATION ADMIN: CPT | Mod: S$GLB,,, | Performed by: INTERNAL MEDICINE

## 2019-09-26 PROCEDURE — 3045F PR MOST RECENT HEMOGLOBIN A1C LEVEL 7.0-9.0%: CPT | Mod: CPTII,S$GLB,, | Performed by: INTERNAL MEDICINE

## 2019-09-26 PROCEDURE — 90670 PCV13 VACCINE IM: CPT | Mod: S$GLB,,, | Performed by: INTERNAL MEDICINE

## 2019-09-26 PROCEDURE — 90662 IIV NO PRSV INCREASED AG IM: CPT | Mod: S$GLB,,, | Performed by: INTERNAL MEDICINE

## 2019-09-26 PROCEDURE — 3074F SYST BP LT 130 MM HG: CPT | Mod: CPTII,S$GLB,, | Performed by: INTERNAL MEDICINE

## 2019-09-26 PROCEDURE — 90670 PNEUMOCOCCAL CONJUGATE VACCINE 13-VALENT LESS THAN 5YO & GREATER THAN: ICD-10-PCS | Mod: S$GLB,,, | Performed by: INTERNAL MEDICINE

## 2019-09-26 PROCEDURE — 90662 FLU VACCINE - HIGH DOSE (65+) PRESERVATIVE FREE IM: ICD-10-PCS | Mod: S$GLB,,, | Performed by: INTERNAL MEDICINE

## 2019-09-26 PROCEDURE — 90471 FLU VACCINE - HIGH DOSE (65+) PRESERVATIVE FREE IM: ICD-10-PCS | Mod: S$GLB,,, | Performed by: INTERNAL MEDICINE

## 2019-09-26 PROCEDURE — 90472 IMMUNIZATION ADMIN EACH ADD: CPT | Mod: S$GLB,,, | Performed by: INTERNAL MEDICINE

## 2019-09-26 PROCEDURE — 99397 PER PM REEVAL EST PAT 65+ YR: CPT | Mod: 25,S$GLB,, | Performed by: INTERNAL MEDICINE

## 2019-09-26 PROCEDURE — 3078F PR MOST RECENT DIASTOLIC BLOOD PRESSURE < 80 MM HG: ICD-10-PCS | Mod: CPTII,S$GLB,, | Performed by: INTERNAL MEDICINE

## 2019-09-26 PROCEDURE — 3045F PR MOST RECENT HEMOGLOBIN A1C LEVEL 7.0-9.0%: ICD-10-PCS | Mod: CPTII,S$GLB,, | Performed by: INTERNAL MEDICINE

## 2019-09-26 PROCEDURE — 3074F PR MOST RECENT SYSTOLIC BLOOD PRESSURE < 130 MM HG: ICD-10-PCS | Mod: CPTII,S$GLB,, | Performed by: INTERNAL MEDICINE

## 2019-09-26 NOTE — PROGRESS NOTES
HPI:  Patient is a 63-year-old man who comes today for follow-up of his diabetes, hypertension, lipids, chronic kidney disease, coronary artery disease, congestive heart failure, and for his annual physical.  Patient states his blood sugars have been doing well.  He denies any hypoglycemic events.  His blood pressure has also been well controlled.  He denies any chest pains, shortness of breath or palpitations.  His follow-up with the transplant services is doing well.  There been no other new problems or complaints      Current MEDS: medcard review, verified and update  Allergies: Per the electronic medical record    Past Medical History:   Diagnosis Date    Acquired renal cyst of left kidney     Anemia associated with chronic renal failure     CAD (coronary artery disease)     nonobstructive c 9/14    CHF (congestive heart failure)     Chronic immunosuppression with Prograf and MMF 6/18/2015    CKD (chronic kidney disease) stage 3, GFR 30-59 ml/min     Diabetic retinopathy     DM (diabetes mellitus), type 2 with complications 1994    Edema     End stage kidney disease     s/p transplant, doing well    Gallbladder polyp     Heart failure, diastolic, due to HTN     Hemodialysis status     off since transplant    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     History of colon polyps     HPTH (hyperparathyroidism)     Hyperlipidemia     Hypertension associated with stage 3 chronic kidney disease due to type 2 diabetes mellitus     Obesity     PCO (posterior capsular opacification), left 3/4/2019    Proteinuria     resolved s/p transplant    S/P kidney transplant     Sleep apnea     Type 2 diabetes, uncontrolled, with retinopathy     Type II diabetes mellitus with renal manifestations        Past Surgical History:   Procedure Laterality Date    CARDIAC CATHETERIZATION  2008    normal coronary    COLONOSCOPY N/A 4/5/2018    Procedure: COLONOSCOPY;  Surgeon: Chava  "BETTY Ronquillo MD;  Location: [facility];  Service: Endoscopy;  Laterality: N/A;    KIDNEY TRANSPLANT      RETINAL LASER PROCEDURE         SHx: per the electronic medical record    FHx: recorded in the electronic medical record    ROS:    denies any chest pains or shortness of breath. Denies any nausea, vomiting or diarrhea. Denies any fever, chills or sweats. Denies any change in weight, voice, stool, skin or hair. Denies any dysuria, dyspepsia or dysphagia. Denies any change in vision, hearing or headaches. Denies any swollen lymph nodes or loss of memory.    PE:  /60 (BP Location: Right arm)   Pulse 84   Temp 97.5 °F (36.4 °C) (Tympanic)   Ht 5' 8" (1.727 m)   Wt 126.8 kg (279 lb 8.7 oz)   SpO2 96%   BMI 42.50 kg/m²   Gen: Well-developed, well-nourished, male, in no acute distress, oriented x3  HEENT: neck is supple, no adenopathy, carotids 2+ equal without bruits, thyroid exam normal size without nodules.  CHEST: clear to auscultation and percussion  CVS: regular rate and rhythm without significant murmur, gallop, or rubs  ABD: soft, benign, no rebound no guarding, no distention.  Bowel sounds are normal.     nontender.  No palpable masses.  No organomegaly and no audible bruits.  RECTAL:  Deferred.  EXT: no clubbing, cyanosis, or edema  LYMPH: no cervical, inguinal, or axillary adenopathy  FEET:   No ulcers or pressure sores.  Monofilament testing shows diminished sensation  NEURO: gait normal.  Cranial nerves II- XII intact. No nystagmus.  Speech normal.   Gross motor unremarkable.    Lab Results   Component Value Date    WBC 8.26 07/03/2019    HGB 13.6 (L) 07/03/2019    HCT 44.7 07/03/2019     07/03/2019    CHOL 217 (H) 07/03/2019    TRIG 179 (H) 07/03/2019    HDL 52 07/03/2019    ALT 20 06/03/2019    AST 17 06/03/2019     07/03/2019    K 3.1 (L) 07/03/2019     07/03/2019    CREATININE 1.8 (H) 07/03/2019    BUN 24 (H) 07/03/2019    CO2 28 07/03/2019    TSH 0.574 07/03/2019    PSA " 0.31 04/04/2019    INR 1.0 06/18/2015    HGBA1C 7.2 (H) 07/03/2019       Impression:  Multiple medical problems below, stable  Patient Active Problem List   Diagnosis    Anemia associated with chronic renal failure    Obesity    Acquired renal cyst of left kidney    Nephrolithiasis    Sleep apnea    Pseudophakia    CAD (coronary artery disease)    Living-related donor kidney transplant (daughter) - 6/12/15    Diabetic peripheral neuropathy    Chronic immunosuppression with Prograf, MMF and prednisone    Secondary hyperparathyroidism of renal origin    CKD (chronic kidney disease) stage 3, GFR 30-59 ml/min    Type II diabetes mellitus with renal manifestations    Hypertension associated with stage 3 chronic kidney disease due to type 2 diabetes mellitus    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    DM (diabetes mellitus), type 2 with complications    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, with long-term current use of insulin    Epiretinal membrane (ERM) of both eyes    History of colon polyps    Benign prostatic hyperplasia without lower urinary tract symptoms    PCO (posterior capsular opacification), left    Chronic combined systolic and diastolic congestive heart failure    Deep vein thrombosis (DVT) of lower extremity       Plan:   Orders Placed This Encounter    (In Office Administered) Pneumococcal Conjugate Vaccine (13 Valent) (IM)    Comprehensive metabolic panel    Hemoglobin A1c    CBC auto differential    Lipid panel    Protein / creatinine ratio, urine    TSH    Hemoglobin A1c    Basic metabolic panel    Lipid panel     He was given high-dose influenza and Prevnar vaccine today.  Patient will have lab work done next week for his diabetes and again in 3 months.  He will see me in 3 months.  Medications remain the same  This note is generated with speech recognition software and is subject to transcription error and sound alike phrases  that may be missed by proofreading.

## 2019-09-26 NOTE — PROGRESS NOTES
Administered high dose flu shot to right deltoid.  VIS given.  See immunization record.  Pt tolerated well.  Advised to wait at least 15 minutes to monitor for adverse reactions.  Pt verbalizes understanding.  ndc  67213-413-66  Lot [id number]  Exp  4-      Administered prevnar 13 IM shot to left deltoid.  VIS given.  See immunization record.  Pt tolerated well.  Advised to wait at least 15 minutes to monitor for adverse reactions.  Pt verbalizes understanding.  ndc  [id number]  Lot  [id number]  Exp  1-2021

## 2019-10-01 DIAGNOSIS — Z94.0 KIDNEY REPLACED BY TRANSPLANT: ICD-10-CM

## 2019-10-01 RX ORDER — PREDNISONE 5 MG/1
5 TABLET ORAL DAILY
Qty: 30 TABLET | Refills: 11 | Status: CANCELLED | OUTPATIENT
Start: 2019-10-01 | End: 2020-09-30

## 2019-10-02 RX ORDER — PREDNISONE 5 MG/1
5 TABLET ORAL DAILY
Qty: 30 TABLET | Refills: 11 | Status: SHIPPED | OUTPATIENT
Start: 2019-10-02 | End: 2020-09-17 | Stop reason: SDUPTHER

## 2019-10-03 ENCOUNTER — LAB VISIT (OUTPATIENT)
Dept: LAB | Facility: HOSPITAL | Age: 64
End: 2019-10-03
Attending: INTERNAL MEDICINE
Payer: COMMERCIAL

## 2019-10-03 DIAGNOSIS — E11.8 TYPE 2 DIABETES MELLITUS WITH COMPLICATION, WITH LONG-TERM CURRENT USE OF INSULIN: ICD-10-CM

## 2019-10-03 DIAGNOSIS — Z79.4 TYPE 2 DIABETES MELLITUS WITH COMPLICATION, WITH LONG-TERM CURRENT USE OF INSULIN: ICD-10-CM

## 2019-10-03 LAB
BASOPHILS # BLD AUTO: 0.02 K/UL (ref 0–0.2)
BASOPHILS NFR BLD: 0.3 % (ref 0–1.9)
DIFFERENTIAL METHOD: ABNORMAL
EOSINOPHIL # BLD AUTO: 0 K/UL (ref 0–0.5)
EOSINOPHIL NFR BLD: 0.3 % (ref 0–8)
ERYTHROCYTE [DISTWIDTH] IN BLOOD BY AUTOMATED COUNT: 13.4 % (ref 11.5–14.5)
HCT VFR BLD AUTO: 47.3 % (ref 40–54)
HGB BLD-MCNC: 13.2 G/DL (ref 14–18)
IMM GRANULOCYTES # BLD AUTO: 0.06 K/UL (ref 0–0.04)
IMM GRANULOCYTES NFR BLD AUTO: 0.8 % (ref 0–0.5)
LYMPHOCYTES # BLD AUTO: 1.2 K/UL (ref 1–4.8)
LYMPHOCYTES NFR BLD: 15.6 % (ref 18–48)
MCH RBC QN AUTO: 25 PG (ref 27–31)
MCHC RBC AUTO-ENTMCNC: 27.9 G/DL (ref 32–36)
MCV RBC AUTO: 90 FL (ref 82–98)
MONOCYTES # BLD AUTO: 0.8 K/UL (ref 0.3–1)
MONOCYTES NFR BLD: 9.9 % (ref 4–15)
NEUTROPHILS # BLD AUTO: 5.8 K/UL (ref 1.8–7.7)
NEUTROPHILS NFR BLD: 73.1 % (ref 38–73)
NRBC BLD-RTO: 0 /100 WBC
PLATELET # BLD AUTO: 189 K/UL (ref 150–350)
PMV BLD AUTO: 12 FL (ref 9.2–12.9)
RBC # BLD AUTO: 5.28 M/UL (ref 4.6–6.2)
WBC # BLD AUTO: 7.97 K/UL (ref 3.9–12.7)

## 2019-10-03 PROCEDURE — 36415 COLL VENOUS BLD VENIPUNCTURE: CPT | Mod: PO

## 2019-10-03 PROCEDURE — 80061 LIPID PANEL: CPT

## 2019-10-03 PROCEDURE — 80053 COMPREHEN METABOLIC PANEL: CPT

## 2019-10-03 PROCEDURE — 84443 ASSAY THYROID STIM HORMONE: CPT

## 2019-10-03 PROCEDURE — 85025 COMPLETE CBC W/AUTO DIFF WBC: CPT

## 2019-10-03 PROCEDURE — 83036 HEMOGLOBIN GLYCOSYLATED A1C: CPT

## 2019-10-04 LAB
ALBUMIN SERPL BCP-MCNC: 3.7 G/DL (ref 3.5–5.2)
ALP SERPL-CCNC: 68 U/L (ref 55–135)
ALT SERPL W/O P-5'-P-CCNC: 13 U/L (ref 10–44)
ANION GAP SERPL CALC-SCNC: 14 MMOL/L (ref 8–16)
AST SERPL-CCNC: 17 U/L (ref 10–40)
BILIRUB SERPL-MCNC: 0.5 MG/DL (ref 0.1–1)
BUN SERPL-MCNC: 24 MG/DL (ref 8–23)
CALCIUM SERPL-MCNC: 9 MG/DL (ref 8.7–10.5)
CHLORIDE SERPL-SCNC: 105 MMOL/L (ref 95–110)
CHOLEST SERPL-MCNC: 179 MG/DL (ref 120–199)
CHOLEST/HDLC SERPL: 3.9 {RATIO} (ref 2–5)
CO2 SERPL-SCNC: 27 MMOL/L (ref 23–29)
CREAT SERPL-MCNC: 1.9 MG/DL (ref 0.5–1.4)
EST. GFR  (AFRICAN AMERICAN): 41.8 ML/MIN/1.73 M^2
EST. GFR  (NON AFRICAN AMERICAN): 36.2 ML/MIN/1.73 M^2
ESTIMATED AVG GLUCOSE: 177 MG/DL (ref 68–131)
GLUCOSE SERPL-MCNC: 81 MG/DL (ref 70–110)
HBA1C MFR BLD HPLC: 7.8 % (ref 4–5.6)
HDLC SERPL-MCNC: 46 MG/DL (ref 40–75)
HDLC SERPL: 25.7 % (ref 20–50)
LDLC SERPL CALC-MCNC: 100.8 MG/DL (ref 63–159)
NONHDLC SERPL-MCNC: 133 MG/DL
POTASSIUM SERPL-SCNC: 3.4 MMOL/L (ref 3.5–5.1)
PROT SERPL-MCNC: 6.7 G/DL (ref 6–8.4)
SODIUM SERPL-SCNC: 146 MMOL/L (ref 136–145)
TRIGL SERPL-MCNC: 161 MG/DL (ref 30–150)
TSH SERPL DL<=0.005 MIU/L-ACNC: 0.73 UIU/ML (ref 0.4–4)

## 2019-10-11 ENCOUNTER — OFFICE VISIT (OUTPATIENT)
Dept: CARDIOLOGY | Facility: CLINIC | Age: 64
End: 2019-10-11
Payer: COMMERCIAL

## 2019-10-11 VITALS
HEIGHT: 68 IN | SYSTOLIC BLOOD PRESSURE: 110 MMHG | WEIGHT: 278.88 LBS | HEART RATE: 95 BPM | DIASTOLIC BLOOD PRESSURE: 64 MMHG | OXYGEN SATURATION: 95 % | BODY MASS INDEX: 42.27 KG/M2

## 2019-10-11 DIAGNOSIS — E66.01 CLASS 3 SEVERE OBESITY DUE TO EXCESS CALORIES WITH SERIOUS COMORBIDITY AND BODY MASS INDEX (BMI) OF 40.0 TO 44.9 IN ADULT: ICD-10-CM

## 2019-10-11 DIAGNOSIS — E11.21 TYPE 2 DIABETES MELLITUS WITH DIABETIC NEPHROPATHY, UNSPECIFIED WHETHER LONG TERM INSULIN USE: ICD-10-CM

## 2019-10-11 DIAGNOSIS — G47.33 OBSTRUCTIVE SLEEP APNEA SYNDROME: ICD-10-CM

## 2019-10-11 DIAGNOSIS — I82.492 ACUTE DEEP VEIN THROMBOSIS (DVT) OF OTHER SPECIFIED VEIN OF LEFT LOWER EXTREMITY: ICD-10-CM

## 2019-10-11 DIAGNOSIS — N18.30 CKD (CHRONIC KIDNEY DISEASE) STAGE 3, GFR 30-59 ML/MIN: ICD-10-CM

## 2019-10-11 DIAGNOSIS — Z94.0 KIDNEY TRANSPLANT STATUS, LIVING RELATED DONOR: Chronic | ICD-10-CM

## 2019-10-11 DIAGNOSIS — I25.10 CORONARY ARTERY DISEASE INVOLVING NATIVE CORONARY ARTERY OF NATIVE HEART WITHOUT ANGINA PECTORIS: Primary | ICD-10-CM

## 2019-10-11 DIAGNOSIS — N18.30 HYPERTENSION ASSOCIATED WITH STAGE 3 CHRONIC KIDNEY DISEASE DUE TO TYPE 2 DIABETES MELLITUS: ICD-10-CM

## 2019-10-11 DIAGNOSIS — I12.9 HYPERTENSION ASSOCIATED WITH STAGE 3 CHRONIC KIDNEY DISEASE DUE TO TYPE 2 DIABETES MELLITUS: ICD-10-CM

## 2019-10-11 DIAGNOSIS — I50.42 CHRONIC COMBINED SYSTOLIC AND DIASTOLIC CONGESTIVE HEART FAILURE: ICD-10-CM

## 2019-10-11 DIAGNOSIS — E11.22 HYPERTENSION ASSOCIATED WITH STAGE 3 CHRONIC KIDNEY DISEASE DUE TO TYPE 2 DIABETES MELLITUS: ICD-10-CM

## 2019-10-11 PROCEDURE — 3008F BODY MASS INDEX DOCD: CPT | Mod: CPTII,S$GLB,, | Performed by: INTERNAL MEDICINE

## 2019-10-11 PROCEDURE — 99999 PR PBB SHADOW E&M-EST. PATIENT-LVL III: CPT | Mod: PBBFAC,,, | Performed by: INTERNAL MEDICINE

## 2019-10-11 PROCEDURE — 1101F PR PT FALLS ASSESS DOC 0-1 FALLS W/OUT INJ PAST YR: ICD-10-PCS | Mod: CPTII,S$GLB,, | Performed by: INTERNAL MEDICINE

## 2019-10-11 PROCEDURE — 3074F SYST BP LT 130 MM HG: CPT | Mod: CPTII,S$GLB,, | Performed by: INTERNAL MEDICINE

## 2019-10-11 PROCEDURE — 99999 PR PBB SHADOW E&M-EST. PATIENT-LVL III: ICD-10-PCS | Mod: PBBFAC,,, | Performed by: INTERNAL MEDICINE

## 2019-10-11 PROCEDURE — 1101F PT FALLS ASSESS-DOCD LE1/YR: CPT | Mod: CPTII,S$GLB,, | Performed by: INTERNAL MEDICINE

## 2019-10-11 PROCEDURE — 3074F PR MOST RECENT SYSTOLIC BLOOD PRESSURE < 130 MM HG: ICD-10-PCS | Mod: CPTII,S$GLB,, | Performed by: INTERNAL MEDICINE

## 2019-10-11 PROCEDURE — 3078F DIAST BP <80 MM HG: CPT | Mod: CPTII,S$GLB,, | Performed by: INTERNAL MEDICINE

## 2019-10-11 PROCEDURE — 3078F PR MOST RECENT DIASTOLIC BLOOD PRESSURE < 80 MM HG: ICD-10-PCS | Mod: CPTII,S$GLB,, | Performed by: INTERNAL MEDICINE

## 2019-10-11 PROCEDURE — 99214 PR OFFICE/OUTPT VISIT, EST, LEVL IV, 30-39 MIN: ICD-10-PCS | Mod: S$GLB,,, | Performed by: INTERNAL MEDICINE

## 2019-10-11 PROCEDURE — 99214 OFFICE O/P EST MOD 30 MIN: CPT | Mod: S$GLB,,, | Performed by: INTERNAL MEDICINE

## 2019-10-11 PROCEDURE — 3008F PR BODY MASS INDEX (BMI) DOCUMENTED: ICD-10-PCS | Mod: CPTII,S$GLB,, | Performed by: INTERNAL MEDICINE

## 2019-10-11 RX ORDER — INSULIN LISPRO 100 [IU]/ML
INJECTION, SOLUTION SUBCUTANEOUS
COMMUNITY
End: 2019-11-18

## 2019-10-11 NOTE — PROGRESS NOTES
Subjective:   Patient ID:  Mitch Whittaker is a 63 y.o. male who presents for cardiac consult of Follow-up; Dizziness (after standing); and Edema (left leg)      HPI  The patient came in today for cardiac consult of Follow-up; Dizziness (after standing); and Edema (left leg)      Mitch Whittaker is a 63 y.o. male with current medical conditions non obs CAD, DVT on Eliquis, HFrEF 45-50%, CKD, DM, MARION, HTN, HLD, ESRD s/p renal transplant presents for follow up CV eval.     3/15/19  Pt of Dr. Morris, last seen in clinic 2016. Pt presents after recent admission for CHF at [facility] -   Patient is a 63-year-old gentleman who presented to the hospital with shortness of breath. He was found to have pulmonary edema on chest x-ray and bilateral pedal edema. He was admitted for treatment of acute CHF. Echo showed reduced ejection fraction at 25-30%. He was treated with IV Lasix 80 mg twice daily that has been transitioned to Lasix p.o. Patient is not euvolemic and his creatinine is trending up for this reason I am cutting back on Lasix to 40 mg twice daily. Can follow-up with cardiology in 3 days. He sees one [facility]. He was seen by Dr. Omayra Esparza from [facility]. Recommendation is to start him on Entresto once his renal functions stabilize.Pt had edema and SOB while in bed and then went to hospital. Prior to admission he was on lasix PRN. He will see Dr. Pacheco for nephro eval, transplant nephro Dr. Dejesus.  Has been feeling good on lasix 40 BID, has been walking well overall. Does not have CPAP machine.     5/13/19  He was started on Entresto after last visit as renal function improved/stabilized. Has seen Dr. Mason recently started on CPAP again. Toprol dec to 25 mg due to hypotension. Discussed will repeat echo in 1 month to evaluate LV function, will refer for ICD if EF remains low. He woke up with right sided back pain. He has been using CPAP for about a little over a week. Has been doing well lately overall,  BP well controlled, no STEELE/LE lately. PT has mild forgetfulness but family at home has been taking care of him closely.     7/5/19  ECHO last month EF improved to 45-50%. Will not need ICD now. He has LLE pain x 4 days. He has been using Tylenol and ICY HOT for pain relief. Pain started L calf. Is taking lasix 40mg daily. Feels dizzy and lightheaded with standing at times.     7/24/19  Last week - DVT noted on u/s. Started Eliquis 10 BID x 7 days and Eliquis 5 BID after. NO bleeding issues lately with Eliquis, had minor blood after insulin shot which resolved with pressure. He has been walking more lately. Late June he had leg pain and was not walking much since, he has a habit of sitting down for a long period of time. He is on Eliquis 5 BID dosing now.     10/11/19  His recent LE u/s with non occlusive thrombus in PTV in left but no thrombus in POP vein on left. He still feels L leg swelling. No SOB. Has been using CPAP machine regularly. He feels dizzy at times and when he gets up more lightheaded, discussed needs to eval with urologist to stop flomax/proscar.     Patient feels no chest pain, no sob, no leg swelling, no PND, no palpitation,  no syncope, no CNS symptoms.    Patient has dec exercise tolerance.    Patient is compliant with medications.    ECG - NSR, sinus arrhythmia, inferolat TWI      RIGHT:  No evidence of Right lower extremity DVT.    LEFT:  Age Indeterminate DVT of the Posterior Tibial Vein.  In comparison to LEV done 7/19 there is no longer thrombus in the POP vein on the left, but there still appears to be non occlusive thrombus seen in the PTV on the left.    CONCLUSIONS   Technically difficult study.     This document has been electronically    SIGNED BY: Darius Azevedo MD On: 09/23/2019 17:20     ECHO : 06/03/2019   CONCLUSIONS     1 - Concentric hypertrophy.     2 - Wall motion abnormalities.     3 - Mildly depressed left ventricular systolic function (EF 45-50%).     4 - Impaired LV  relaxation, normal LAP (grade 1 diastolic dysfunction).     5 - Normal right ventricular systolic function .     6 - Trivial pericardial effusion.         Nuclear Stress 06/20/2018   Nuclear Quantitative Functional Analysis:   LVEF: 46 %  LVED Volume: 144 ml  LVES Volume: 91 ml    Impression: NORMAL MYOCARDIAL PERFUSION  1. The perfusion scan is free of evidence for myocardial ischemia or injury.   2. Resting wall motion is physiologic.   3. There is resting LV dysfunction with a reduced ejection fraction of 46 %.   4. The ventricular volumes are normal at rest and stress.   5. The extracardiac distribution of radioactivity is normal.       Past Medical History:   Diagnosis Date    Acquired renal cyst of left kidney     Anemia associated with chronic renal failure     CAD (coronary artery disease)     nonobstructive c 9/14    CHF (congestive heart failure)     Chronic immunosuppression with Prograf and MMF 6/18/2015    CKD (chronic kidney disease) stage 3, GFR 30-59 ml/min     Diabetic retinopathy     DM (diabetes mellitus), type 2 with complications 1994    Edema     End stage kidney disease     s/p transplant, doing well    Gallbladder polyp     Heart failure, diastolic, due to HTN     Hemodialysis status     off since transplant    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     History of colon polyps     HPTH (hyperparathyroidism)     Hyperlipidemia     Hypertension associated with stage 3 chronic kidney disease due to type 2 diabetes mellitus     Obesity     PCO (posterior capsular opacification), left 3/4/2019    Proteinuria     resolved s/p transplant    S/P kidney transplant     Sleep apnea     Type 2 diabetes, uncontrolled, with retinopathy     Type II diabetes mellitus with renal manifestations        Past Surgical History:   Procedure Laterality Date    CARDIAC CATHETERIZATION  2008    normal coronary    COLONOSCOPY N/A 4/5/2018    Procedure:  "COLONOSCOPY;  Surgeon: Chava Ronquillo MD;  Location: [facility];  Service: Endoscopy;  Laterality: N/A;    KIDNEY TRANSPLANT      RETINAL LASER PROCEDURE         Social History     Tobacco Use    Smoking status: Former Smoker     Last attempt to quit: 2013     Years since quittin.3    Smokeless tobacco: Former User     Quit date: 2013    Tobacco comment: used marijuana since [id number], stopped after started dialysis   Substance Use Topics    Alcohol use: No     Alcohol/week: 0.0 standard drinks    Drug use: No     Comment:         Family History   Problem Relation Age of Onset    Diabetes Mother     Hypertension Mother     Heart failure Mother     Kidney disease Sister         ESRD    Diabetes Sister     Diabetes Maternal Grandmother     Cancer Neg Hx        Patient's Medications   New Prescriptions    No medications on file   Previous Medications    APIXABAN (ELIQUIS) 5 MG TAB    Take 1 tablet (5 mg total) by mouth 2 (two) times daily.    ASPIRIN (ECOTRIN) 81 MG EC TABLET    Take 1 tablet by mouth Daily.     BD ULTRA-FINE MINI PEN NEEDLE 31 GAUGE X 3/16" NDLE    Use to inject insulin as needed up to 4 times daily    BISACODYL (DULCOLAX) 5 MG EC TABLET    Take 5 mg by mouth daily as needed for Constipation.    BLOOD SUGAR DIAGNOSTIC STRP    Use to test four times per day    ERGOCALCIFEROL (VITAMIN D2) 50,000 UNIT CAP    Take 1 capsule by mouth every 7 days    FAMOTIDINE (PEPCID) 20 MG TABLET    TAKE ONE TABLET BY MOUTH EVERY EVENING    FINASTERIDE (PROSCAR) 5 MG TABLET    Take 1 tablet (5 mg total) by mouth once daily.    FISH OIL-OMEGA-3 FATTY ACIDS 300-1,000 MG CAPSULE    Take 1 g by mouth.    FISH,BORA,FLAX OILS-OM3,6,9NO1 1,200 MG CAP    Take by mouth.    FLUTICASONE (FLONASE) 50 MCG/ACTUATION NASAL SPRAY    use 2 sprays (100 mcg total) in each nostril once daily    FUROSEMIDE (LASIX) 40 MG TABLET    Take 1 tablet (40 mg total) by mouth once daily.    GLIMEPIRIDE (AMARYL) 4 MG TABLET " "   Take 1 tablet (4 mg total) by mouth before breakfast.    INSULIN ASPART U-100 (NOVOLOG FLEXPEN U-100 INSULIN) 100 UNIT/ML (3 ML) INPN PEN    Inject 25 Units into the skin 3 (three) times daily with meals.    INSULIN GLARGINE 100 UNITS/ML (3ML) SUBQ PEN    Inject 30 Units into the skin 2 (two) times daily.    INSULIN LISPRO (HUMALOG JOSEPH KWIKPEN U-100) 100 UNIT/ML INPH    Inject into the skin.    KETOCONAZOLE (NIZORAL) 200 MG TAB    Take 0.5 tablets (100 mg total) by mouth once daily.    LANCETS 33 GAUGE MISC    1 Stick by Misc.(Non-Drug; Combo Route) route as directed. Contour lancets. Use to check BG 2-3 times daily.    METOPROLOL SUCCINATE (TOPROL-XL) 25 MG 24 HR TABLET    Take 1 tablet (25 mg total) by mouth once daily.    MULTIVITAMIN (THERA) TABLET    Take 1 tablet by mouth.    MYCOPHENOLATE (CELLCEPT) 250 MG CAP    Take 3 capsules (750 mg total) by mouth 2 (two) times daily.    OMEGA-3 FATTY ACIDS-VITAMIN E (FISH OIL) 1,000 MG CAP    Take 1 capsule by mouth once daily.     PEN NEEDLE, DIABETIC (BD INSULIN PEN NEEDLE UF SHORT) 31 GAUGE X 5/16" NDLE    USE TO INJECT INSULIN TWICE A DAY    POLYETHYLENE GLYCOL (GLYCOLAX) 17 GRAM PWPK    Take 17 grams by mouth daily for 7 days.    PREDNISONE (DELTASONE) 5 MG TABLET    Take 1 tablet (5 mg total) by mouth once daily.    ROSUVASTATIN (CRESTOR) 40 MG TAB    TAKE ONE TABLET BY MOUTH EVERY EVENING    SACUBITRIL-VALSARTAN (ENTRESTO) 24-26 MG PER TABLET    Take 1 tablet by mouth 2 (two) times daily.    TACROLIMUS (PROGRAF) 1 MG CAP    Take 4 capsules (4 mg total) by mouth every 12 (twelve) hours.    TAMSULOSIN (FLOMAX) 0.4 MG CAP    Take 1 capsule (0.4 mg total) by mouth once daily.   Modified Medications    No medications on file   Discontinued Medications    No medications on file       Review of Systems   Constitutional: Positive for malaise/fatigue.   HENT: Negative.    Eyes: Negative.    Respiratory: Negative.    Cardiovascular: Positive for leg swelling. " "Negative for chest pain and palpitations.   Gastrointestinal: Negative.    Genitourinary: Negative.    Musculoskeletal: Positive for back pain.   Skin: Negative.    Neurological: Positive for dizziness.   Endo/Heme/Allergies: Negative.    Psychiatric/Behavioral: Negative.    All 12 systems otherwise negative.      Wt Readings from Last 3 Encounters:   10/11/19 126.5 kg (278 lb 14.1 oz)   09/26/19 126.8 kg (279 lb 8.7 oz)   07/24/19 123.8 kg (273 lb)     Temp Readings from Last 3 Encounters:   09/26/19 97.5 °F (36.4 °C) (Tympanic)   06/17/19 98 °F (36.7 °C) (Oral)   03/19/19 97.6 °F (36.4 °C) (Tympanic)     BP Readings from Last 3 Encounters:   10/11/19 110/64   09/26/19 100/60   07/24/19 124/74     Pulse Readings from Last 3 Encounters:   10/11/19 95   09/26/19 84   07/24/19 93       /64 (BP Location: Left arm, Patient Position: Sitting)   Pulse 95   Ht 5' 8" (1.727 m)   Wt 126.5 kg (278 lb 14.1 oz)   SpO2 95%   BMI 42.40 kg/m²     Objective:   Physical Exam   Constitutional: He is oriented to person, place, and time. He appears well-developed and well-nourished. No distress.   HENT:   Head: Normocephalic and atraumatic.   Nose: Nose normal.   Mouth/Throat: Oropharynx is clear and moist.   Eyes: Conjunctivae and EOM are normal. No scleral icterus.   Neck: Normal range of motion. Neck supple. No JVD present. No thyromegaly present.   Cardiovascular: Normal rate, regular rhythm, S1 normal and S2 normal. Exam reveals no gallop, no S3, no S4 and no friction rub.   No murmur heard.  Pulmonary/Chest: Effort normal and breath sounds normal. No stridor. No respiratory distress. He has no wheezes. He has no rales. He exhibits no tenderness.   Abdominal: Soft. Bowel sounds are normal. He exhibits no distension and no mass. There is no tenderness. There is no rebound.   Genitourinary:   Genitourinary Comments: Deferred   Musculoskeletal: Normal range of motion. He exhibits edema. He exhibits no tenderness or " deformity.   Lymphadenopathy:     He has no cervical adenopathy.   Neurological: He is alert and oriented to person, place, and time. He exhibits normal muscle tone. Coordination normal.   Skin: Skin is warm and dry. No rash noted. He is not diaphoretic. No erythema. No pallor.   Psychiatric: He has a normal mood and affect. His behavior is normal. Judgment and thought content normal.   Nursing note and vitals reviewed.      Lab Results   Component Value Date     (H) 10/03/2019    K 3.4 (L) 10/03/2019     10/03/2019    CO2 27 10/03/2019    BUN 24 (H) 10/03/2019    CREATININE 1.9 (H) 10/03/2019    GLU 81 10/03/2019    HGBA1C 7.8 (H) 10/03/2019    MG 1.7 06/03/2019    AST 17 10/03/2019    ALT 13 10/03/2019    ALBUMIN 3.7 10/03/2019    PROT 6.7 10/03/2019    BILITOT 0.5 10/03/2019    WBC 7.97 10/03/2019    HGB 13.2 (L) 10/03/2019    HCT 47.3 10/03/2019    HCT 36 06/12/2015    MCV 90 10/03/2019     10/03/2019    INR 1.0 06/18/2015    TSH 0.734 10/03/2019    CHOL 179 10/03/2019    HDL 46 10/03/2019    LDLCALC 100.8 10/03/2019    TRIG 161 (H) 10/03/2019     (H) 12/04/2018     Assessment:      1. Coronary artery disease involving native coronary artery of native heart without angina pectoris    2. Hypertension associated with stage 3 chronic kidney disease due to type 2 diabetes mellitus    3. Chronic combined systolic and diastolic congestive heart failure    4. Living-related donor kidney transplant (daughter) - 6/12/15    5. CKD (chronic kidney disease) stage 3, GFR 30-59 ml/min    6. Type 2 diabetes mellitus with diabetic nephropathy, unspecified whether long term insulin use    7. Obstructive sleep apnea syndrome    8. Acute deep vein thrombosis (DVT) of other specified vein of left lower extremity    9. Class 3 severe obesity due to excess calories with serious comorbidity and body mass index (BMI) of 40.0 to 44.9 in adult        Plan:   1. Chronic CHF EF 45-50%  - cont lasix, and extra PRN,  pt euvolemic   - cont Toprol and Entresto  - cont low salt diet, fluid restriction    2. HTN   - cont meds for afterload reduction   - low salt diet    3. HLD  - cont statin    4. ESRD s/p Transplant with CKD  - f/u with nephro    5. CAD, non obs  - cont meds  - stress 6/2018 negative    6. MARION  -cont CPAP    7. Obesity  -rec weight loss with diet/exercise     8. DVT  - cont Eliquis 5 BID  - will repeat u/s in 3 months-  non occlusive thrombus in PTV in left but no thrombus in POP vein on left.    9. Dizziness  - likeky sec to Flomax and Proscar, can discuss with urologist if can stop    Thank you for allowing me to participate in this patient's care. Please do not hesitate to contact me with any questions or concerns. Consult note has been forwarded to the referral physician.

## 2019-10-14 ENCOUNTER — TELEPHONE (OUTPATIENT)
Dept: CARDIOLOGY | Facility: CLINIC | Age: 64
End: 2019-10-14

## 2019-10-14 NOTE — TELEPHONE ENCOUNTER
LVM that Dr. Muhammad requested his venous be repeated in 3 months.  The appointment that was scheduled for tomorrow at 11:00 will be r/s to a later date and time.  It was scheduled too soon by error.

## 2019-10-30 RX ORDER — FUROSEMIDE 80 MG/1
TABLET ORAL
Qty: 30 TABLET | Refills: 11 | Status: SHIPPED | OUTPATIENT
Start: 2019-10-30 | End: 2020-10-11 | Stop reason: SDUPTHER

## 2019-11-13 DIAGNOSIS — I50.42 CHRONIC COMBINED SYSTOLIC AND DIASTOLIC CONGESTIVE HEART FAILURE: ICD-10-CM

## 2019-11-18 ENCOUNTER — OFFICE VISIT (OUTPATIENT)
Dept: INTERNAL MEDICINE | Facility: CLINIC | Age: 64
End: 2019-11-18
Payer: COMMERCIAL

## 2019-11-18 VITALS
WEIGHT: 279.31 LBS | TEMPERATURE: 98 F | HEIGHT: 68 IN | OXYGEN SATURATION: 96 % | SYSTOLIC BLOOD PRESSURE: 104 MMHG | BODY MASS INDEX: 42.33 KG/M2 | DIASTOLIC BLOOD PRESSURE: 68 MMHG | HEART RATE: 96 BPM

## 2019-11-18 DIAGNOSIS — T14.8XXA SKIN ABRASION: Primary | ICD-10-CM

## 2019-11-18 PROCEDURE — 99213 PR OFFICE/OUTPT VISIT, EST, LEVL III, 20-29 MIN: ICD-10-PCS | Mod: S$GLB,,, | Performed by: INTERNAL MEDICINE

## 2019-11-18 PROCEDURE — 1101F PR PT FALLS ASSESS DOC 0-1 FALLS W/OUT INJ PAST YR: ICD-10-PCS | Mod: CPTII,S$GLB,, | Performed by: INTERNAL MEDICINE

## 2019-11-18 PROCEDURE — 3008F PR BODY MASS INDEX (BMI) DOCUMENTED: ICD-10-PCS | Mod: CPTII,S$GLB,, | Performed by: INTERNAL MEDICINE

## 2019-11-18 PROCEDURE — 3074F PR MOST RECENT SYSTOLIC BLOOD PRESSURE < 130 MM HG: ICD-10-PCS | Mod: CPTII,S$GLB,, | Performed by: INTERNAL MEDICINE

## 2019-11-18 PROCEDURE — 99999 PR PBB SHADOW E&M-EST. PATIENT-LVL III: ICD-10-PCS | Mod: PBBFAC,,, | Performed by: INTERNAL MEDICINE

## 2019-11-18 PROCEDURE — 3078F PR MOST RECENT DIASTOLIC BLOOD PRESSURE < 80 MM HG: ICD-10-PCS | Mod: CPTII,S$GLB,, | Performed by: INTERNAL MEDICINE

## 2019-11-18 PROCEDURE — 99999 PR PBB SHADOW E&M-EST. PATIENT-LVL III: CPT | Mod: PBBFAC,,, | Performed by: INTERNAL MEDICINE

## 2019-11-18 PROCEDURE — 3078F DIAST BP <80 MM HG: CPT | Mod: CPTII,S$GLB,, | Performed by: INTERNAL MEDICINE

## 2019-11-18 PROCEDURE — 3074F SYST BP LT 130 MM HG: CPT | Mod: CPTII,S$GLB,, | Performed by: INTERNAL MEDICINE

## 2019-11-18 PROCEDURE — 99213 OFFICE O/P EST LOW 20 MIN: CPT | Mod: S$GLB,,, | Performed by: INTERNAL MEDICINE

## 2019-11-18 PROCEDURE — 1101F PT FALLS ASSESS-DOCD LE1/YR: CPT | Mod: CPTII,S$GLB,, | Performed by: INTERNAL MEDICINE

## 2019-11-18 PROCEDURE — 3008F BODY MASS INDEX DOCD: CPT | Mod: CPTII,S$GLB,, | Performed by: INTERNAL MEDICINE

## 2019-11-18 RX ORDER — INSULIN GLARGINE 100 [IU]/ML
35 INJECTION, SOLUTION SUBCUTANEOUS 2 TIMES DAILY
Qty: 30 ML | Refills: 11 | Status: SHIPPED | OUTPATIENT
Start: 2019-11-18 | End: 2020-11-29 | Stop reason: SDUPTHER

## 2019-11-18 NOTE — PROGRESS NOTES
"HPI:  Patient is a 63-year-old man who comes today with complaints of injury to his right anterior shin 1 week ago.  He was put knee up a bed in a really fell and landed on his shin.  It is not healed completely yet.    Current meds have been verified and updated per the EMR  Exam:/68 (BP Location: Left arm)   Pulse 96   Temp 97.9 °F (36.6 °C) (Tympanic)   Ht 5' 8" (1.727 m)   Wt 126.7 kg (279 lb 5.2 oz)   SpO2 96%   BMI 42.47 kg/m²   He has a small 1 cm superficial abrasion over the right anterior shin.  There is no signs of any infection.    Lab Results   Component Value Date    WBC 7.97 10/03/2019    HGB 13.2 (L) 10/03/2019    HCT 47.3 10/03/2019     10/03/2019    CHOL 179 10/03/2019    TRIG 161 (H) 10/03/2019    HDL 46 10/03/2019    ALT 13 10/03/2019    AST 17 10/03/2019     (H) 10/03/2019    K 3.4 (L) 10/03/2019     10/03/2019    CREATININE 1.9 (H) 10/03/2019    BUN 24 (H) 10/03/2019    CO2 27 10/03/2019    TSH 0.734 10/03/2019    PSA 0.31 04/04/2019    INR 1.0 06/18/2015    HGBA1C 7.8 (H) 10/03/2019       Impression:  Superficial abrasion  Patient Active Problem List   Diagnosis    Anemia associated with chronic renal failure    Obesity    Acquired renal cyst of left kidney    Nephrolithiasis    Sleep apnea    Pseudophakia    CAD (coronary artery disease)    Living-related donor kidney transplant (daughter) - 6/12/15    Diabetic peripheral neuropathy    Chronic immunosuppression with Prograf, MMF and prednisone    Secondary hyperparathyroidism of renal origin    CKD (chronic kidney disease) stage 3, GFR 30-59 ml/min    Type II diabetes mellitus with renal manifestations    Hypertension associated with stage 3 chronic kidney disease due to type 2 diabetes mellitus    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    DM (diabetes mellitus), type 2 with complications    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, with long-term current use " of insulin    Epiretinal membrane (ERM) of both eyes    History of colon polyps    Benign prostatic hyperplasia without lower urinary tract symptoms    PCO (posterior capsular opacification), left    Chronic combined systolic and diastolic congestive heart failure    Deep vein thrombosis (DVT) of lower extremity       Plan:  Orders Placed This Encounter    insulin (LANTUS SOLOSTAR U-100 INSULIN) glargine 100 units/mL (3mL) SubQ pen     Patient was reassured.  He will see apply triple antibiotic ointment over it and keep it covered during the day but leave it open at night.  He will see me at his regular scheduled appointment in December    This note is generated with speech recognition software and is subject to transcription error and sound alike phrases that may be missed by proofreading.

## 2019-12-11 RX ORDER — ERGOCALCIFEROL 1.25 MG/1
CAPSULE ORAL
Qty: 13 CAPSULE | Refills: 3 | Status: SHIPPED | OUTPATIENT
Start: 2019-12-11 | End: 2020-12-09 | Stop reason: SDUPTHER

## 2019-12-18 ENCOUNTER — CLINICAL SUPPORT (OUTPATIENT)
Dept: CARDIOLOGY | Facility: CLINIC | Age: 64
End: 2019-12-18
Attending: INTERNAL MEDICINE
Payer: COMMERCIAL

## 2019-12-18 DIAGNOSIS — I82.492 ACUTE DEEP VEIN THROMBOSIS (DVT) OF OTHER SPECIFIED VEIN OF LEFT LOWER EXTREMITY: ICD-10-CM

## 2019-12-18 PROCEDURE — 93970 EXTREMITY STUDY: CPT | Mod: S$GLB,,, | Performed by: INTERNAL MEDICINE

## 2019-12-18 PROCEDURE — 93970 CAR US DOPPLER VENOUS LEGS BILATERAL: ICD-10-PCS | Mod: S$GLB,,, | Performed by: INTERNAL MEDICINE

## 2019-12-19 ENCOUNTER — LAB VISIT (OUTPATIENT)
Dept: LAB | Facility: HOSPITAL | Age: 64
End: 2019-12-19
Attending: INTERNAL MEDICINE
Payer: COMMERCIAL

## 2019-12-19 ENCOUNTER — TELEPHONE (OUTPATIENT)
Dept: INTERNAL MEDICINE | Facility: CLINIC | Age: 64
End: 2019-12-19

## 2019-12-19 DIAGNOSIS — Z79.4 TYPE 2 DIABETES MELLITUS WITH COMPLICATION, WITH LONG-TERM CURRENT USE OF INSULIN: ICD-10-CM

## 2019-12-19 DIAGNOSIS — E11.8 TYPE 2 DIABETES MELLITUS WITH COMPLICATION, WITH LONG-TERM CURRENT USE OF INSULIN: ICD-10-CM

## 2019-12-19 LAB
ANION GAP SERPL CALC-SCNC: 8 MMOL/L (ref 8–16)
BUN SERPL-MCNC: 19 MG/DL (ref 8–23)
CALCIUM SERPL-MCNC: 9 MG/DL (ref 8.7–10.5)
CHLORIDE SERPL-SCNC: 104 MMOL/L (ref 95–110)
CHOLEST SERPL-MCNC: 176 MG/DL (ref 120–199)
CHOLEST/HDLC SERPL: 3 {RATIO} (ref 2–5)
CO2 SERPL-SCNC: 32 MMOL/L (ref 23–29)
CREAT SERPL-MCNC: 1.6 MG/DL (ref 0.5–1.4)
EST. GFR  (AFRICAN AMERICAN): 51.1 ML/MIN/1.73 M^2
EST. GFR  (NON AFRICAN AMERICAN): 44.2 ML/MIN/1.73 M^2
GLUCOSE SERPL-MCNC: 93 MG/DL (ref 70–110)
HDLC SERPL-MCNC: 58 MG/DL (ref 40–75)
HDLC SERPL: 33 % (ref 20–50)
LDLC SERPL CALC-MCNC: 98.2 MG/DL (ref 63–159)
NONHDLC SERPL-MCNC: 118 MG/DL
POTASSIUM SERPL-SCNC: 3.5 MMOL/L (ref 3.5–5.1)
SODIUM SERPL-SCNC: 144 MMOL/L (ref 136–145)
TRIGL SERPL-MCNC: 99 MG/DL (ref 30–150)

## 2019-12-19 PROCEDURE — 80061 LIPID PANEL: CPT

## 2019-12-19 PROCEDURE — 36415 COLL VENOUS BLD VENIPUNCTURE: CPT | Mod: PO

## 2019-12-19 PROCEDURE — 83036 HEMOGLOBIN GLYCOSYLATED A1C: CPT

## 2019-12-19 PROCEDURE — 80048 BASIC METABOLIC PNL TOTAL CA: CPT

## 2019-12-19 NOTE — TELEPHONE ENCOUNTER
Patient walked in complaining of dry cough, congestion, and runny nose. No fever, body aches, or chills. He would like to know what he can take over the counter with his current medicine.

## 2019-12-19 NOTE — TELEPHONE ENCOUNTER
Tell him he can take Allegra D and Mucinex DM for five days and to take his flonase nasal spray twice per day as well.

## 2019-12-20 LAB
ESTIMATED AVG GLUCOSE: 183 MG/DL (ref 68–131)
HBA1C MFR BLD HPLC: 8 % (ref 4–5.6)

## 2020-01-02 ENCOUNTER — OFFICE VISIT (OUTPATIENT)
Dept: URGENT CARE | Facility: CLINIC | Age: 65
End: 2020-01-02
Payer: COMMERCIAL

## 2020-01-02 ENCOUNTER — HOSPITAL ENCOUNTER (OUTPATIENT)
Dept: RADIOLOGY | Facility: HOSPITAL | Age: 65
Discharge: HOME | End: 2020-01-02
Attending: NURSE PRACTITIONER
Payer: COMMERCIAL

## 2020-01-02 ENCOUNTER — TELEPHONE (OUTPATIENT)
Dept: INTERNAL MEDICINE | Facility: CLINIC | Age: 65
End: 2020-01-02

## 2020-01-02 VITALS
SYSTOLIC BLOOD PRESSURE: 130 MMHG | TEMPERATURE: 99 F | WEIGHT: 281.06 LBS | BODY MASS INDEX: 42.6 KG/M2 | OXYGEN SATURATION: 97 % | DIASTOLIC BLOOD PRESSURE: 66 MMHG | HEART RATE: 99 BPM | HEIGHT: 68 IN

## 2020-01-02 DIAGNOSIS — R05.9 COUGH: Primary | ICD-10-CM

## 2020-01-02 DIAGNOSIS — B96.89 ACUTE BACTERIAL SINUSITIS: ICD-10-CM

## 2020-01-02 DIAGNOSIS — Z94.0 KIDNEY TRANSPLANT STATUS, LIVING RELATED DONOR: ICD-10-CM

## 2020-01-02 DIAGNOSIS — R50.9 FEVER, UNSPECIFIED FEVER CAUSE: ICD-10-CM

## 2020-01-02 DIAGNOSIS — R52 GENERALIZED BODY ACHES: ICD-10-CM

## 2020-01-02 DIAGNOSIS — E11.22 HYPERTENSION ASSOCIATED WITH STAGE 3 CHRONIC KIDNEY DISEASE DUE TO TYPE 2 DIABETES MELLITUS: ICD-10-CM

## 2020-01-02 DIAGNOSIS — I12.9 HYPERTENSION ASSOCIATED WITH STAGE 3 CHRONIC KIDNEY DISEASE DUE TO TYPE 2 DIABETES MELLITUS: ICD-10-CM

## 2020-01-02 DIAGNOSIS — N18.30 HYPERTENSION ASSOCIATED WITH STAGE 3 CHRONIC KIDNEY DISEASE DUE TO TYPE 2 DIABETES MELLITUS: ICD-10-CM

## 2020-01-02 DIAGNOSIS — J01.90 ACUTE BACTERIAL SINUSITIS: ICD-10-CM

## 2020-01-02 DIAGNOSIS — R05.9 COUGH: ICD-10-CM

## 2020-01-02 LAB
CTP QC/QA: YES
POC MOLECULAR INFLUENZA A AGN: NEGATIVE
POC MOLECULAR INFLUENZA B AGN: NEGATIVE

## 2020-01-02 PROCEDURE — 3075F PR MOST RECENT SYSTOLIC BLOOD PRESS GE 130-139MM HG: ICD-10-PCS | Mod: CPTII,S$GLB,, | Performed by: NURSE PRACTITIONER

## 2020-01-02 PROCEDURE — 71046 X-RAY EXAM CHEST 2 VIEWS: CPT | Mod: TC,PO

## 2020-01-02 PROCEDURE — 71046 XR CHEST PA AND LATERAL: ICD-10-PCS | Mod: 26,,, | Performed by: RADIOLOGY

## 2020-01-02 PROCEDURE — 1159F PR MEDICATION LIST DOCUMENTED IN MEDICAL RECORD: ICD-10-PCS | Mod: S$GLB,,, | Performed by: NURSE PRACTITIONER

## 2020-01-02 PROCEDURE — 1125F AMNT PAIN NOTED PAIN PRSNT: CPT | Mod: S$GLB,,, | Performed by: NURSE PRACTITIONER

## 2020-01-02 PROCEDURE — 87502 POCT INFLUENZA A/B MOLECULAR: ICD-10-PCS | Mod: QW,S$GLB,, | Performed by: NURSE PRACTITIONER

## 2020-01-02 PROCEDURE — 87502 INFLUENZA DNA AMP PROBE: CPT | Mod: QW,S$GLB,, | Performed by: NURSE PRACTITIONER

## 2020-01-02 PROCEDURE — 3078F DIAST BP <80 MM HG: CPT | Mod: CPTII,S$GLB,, | Performed by: NURSE PRACTITIONER

## 2020-01-02 PROCEDURE — 99214 OFFICE O/P EST MOD 30 MIN: CPT | Mod: S$GLB,,, | Performed by: NURSE PRACTITIONER

## 2020-01-02 PROCEDURE — 3075F SYST BP GE 130 - 139MM HG: CPT | Mod: CPTII,S$GLB,, | Performed by: NURSE PRACTITIONER

## 2020-01-02 PROCEDURE — 1101F PR PT FALLS ASSESS DOC 0-1 FALLS W/OUT INJ PAST YR: ICD-10-PCS | Mod: CPTII,S$GLB,, | Performed by: NURSE PRACTITIONER

## 2020-01-02 PROCEDURE — 99999 PR PBB SHADOW E&M-EST. PATIENT-LVL III: ICD-10-PCS | Mod: PBBFAC,,, | Performed by: NURSE PRACTITIONER

## 2020-01-02 PROCEDURE — 1159F MED LIST DOCD IN RCRD: CPT | Mod: S$GLB,,, | Performed by: NURSE PRACTITIONER

## 2020-01-02 PROCEDURE — 1125F PR PAIN SEVERITY QUANTIFIED, PAIN PRESENT: ICD-10-PCS | Mod: S$GLB,,, | Performed by: NURSE PRACTITIONER

## 2020-01-02 PROCEDURE — 71046 X-RAY EXAM CHEST 2 VIEWS: CPT | Mod: 26,,, | Performed by: RADIOLOGY

## 2020-01-02 PROCEDURE — 99999 PR PBB SHADOW E&M-EST. PATIENT-LVL III: CPT | Mod: PBBFAC,,, | Performed by: NURSE PRACTITIONER

## 2020-01-02 PROCEDURE — 1101F PT FALLS ASSESS-DOCD LE1/YR: CPT | Mod: CPTII,S$GLB,, | Performed by: NURSE PRACTITIONER

## 2020-01-02 PROCEDURE — 99214 PR OFFICE/OUTPT VISIT, EST, LEVL IV, 30-39 MIN: ICD-10-PCS | Mod: S$GLB,,, | Performed by: NURSE PRACTITIONER

## 2020-01-02 PROCEDURE — 3078F PR MOST RECENT DIASTOLIC BLOOD PRESSURE < 80 MM HG: ICD-10-PCS | Mod: CPTII,S$GLB,, | Performed by: NURSE PRACTITIONER

## 2020-01-02 RX ORDER — PROMETHAZINE HYDROCHLORIDE AND DEXTROMETHORPHAN HYDROBROMIDE 6.25; 15 MG/5ML; MG/5ML
5 SYRUP ORAL
Qty: 120 ML | Refills: 0 | Status: SHIPPED | OUTPATIENT
Start: 2020-01-02 | End: 2020-04-07

## 2020-01-02 RX ORDER — FLUTICASONE PROPIONATE 50 MCG
2 SPRAY, SUSPENSION (ML) NASAL DAILY
Qty: 16 G | Refills: 1 | Status: SHIPPED | OUTPATIENT
Start: 2020-01-02 | End: 2020-08-28

## 2020-01-02 RX ORDER — AMOXICILLIN AND CLAVULANATE POTASSIUM 875; 125 MG/1; MG/1
1 TABLET, FILM COATED ORAL EVERY 12 HOURS
Qty: 20 TABLET | Refills: 0 | Status: SHIPPED | OUTPATIENT
Start: 2020-01-02 | End: 2020-01-12

## 2020-01-02 NOTE — TELEPHONE ENCOUNTER
Spoke with patient and the patient states that he is having a cough. The patient will go to Urgent Care

## 2020-01-02 NOTE — PROGRESS NOTES
CHIEF COMPLAINT/REASON FOR VISIT:  runny nose, nasal congestion, fever with body aches     HISTORY OF PRESENT ILLNESS:   64  year-old obese male with daughter complains of runny nose, nasal congestion, postnasal drip, dizziness, earache, ears popping, headache, cough with ribcage pain onset 2 weeks ago.  Complains of fever with body aches onset 2-3 days ago.  Concerned regarding influenza and requesting influenza screen.  Patient admits tried Mucinex / over-the-counter medications with no relief.  Admits had kidney transplant and concerned of regarding infection.  Patient denies chest pain, shortness of breath, nausea, vomiting, diarrhea, urinary discomfort.       Past Medical History:   Diagnosis Date    Acquired renal cyst of left kidney     Anemia associated with chronic renal failure     CAD (coronary artery disease)     nonobstructive c 9/14    CHF (congestive heart failure)     Chronic immunosuppression with Prograf and MMF 6/18/2015    CKD (chronic kidney disease) stage 3, GFR 30-59 ml/min     Diabetic retinopathy     DM (diabetes mellitus), type 2 with complications 1994    Edema     End stage kidney disease     s/p transplant, doing well    Gallbladder polyp     Heart failure, diastolic, due to HTN     Hemodialysis status     off since transplant    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     History of colon polyps     HPTH (hyperparathyroidism)     Hyperlipidemia     Hypertension associated with stage 3 chronic kidney disease due to type 2 diabetes mellitus     Obesity     PCO (posterior capsular opacification), left 3/4/2019    Proteinuria     resolved s/p transplant    S/P kidney transplant     Sleep apnea     Type 2 diabetes, uncontrolled, with retinopathy     Type II diabetes mellitus with renal manifestations          .  Past Surgical History:   Procedure Laterality Date    CARDIAC CATHETERIZATION  2008    normal coronary    COLONOSCOPY N/A  2018    Procedure: COLONOSCOPY;  Surgeon: Chava Ronquillo MD;  Location: [facility];  Service: Endoscopy;  Laterality: N/A;    KIDNEY TRANSPLANT      RETINAL LASER PROCEDURE           Social History     Socioeconomic History    Marital status:      Spouse name: Not on file    Number of children: 2    Years of education: Not on file    Highest education level: Not on file   Occupational History    Occupation: retired     Employer: Retired   Social Needs    Financial resource strain: Not on file    Food insecurity:     Worry: Not on file     Inability: Not on file    Transportation needs:     Medical: Not on file     Non-medical: Not on file   Tobacco Use    Smoking status: Former Smoker     Last attempt to quit: 2013     Years since quittin.6    Smokeless tobacco: Former User     Quit date: 2013    Tobacco comment: used marijuana since [id number], stopped after started dialysis   Substance and Sexual Activity    Alcohol use: No     Alcohol/week: 0.0 standard drinks    Drug use: No     Comment:      Sexual activity: Never   Lifestyle    Physical activity:     Days per week: Not on file     Minutes per session: Not on file    Stress: Not on file   Relationships    Social connections:     Talks on phone: Not on file     Gets together: Not on file     Attends Yarsanism service: Not on file     Active member of club or organization: Not on file     Attends meetings of clubs or organizations: Not on file     Relationship status: Not on file   Other Topics Concern    Not on file   Social History Narrative    . Lives with spouse. Has 2 children. Patient retired as  for [employer]. He has been washing cars.       Family History   Problem Relation Age of Onset    Diabetes Mother     Hypertension Mother     Heart failure Mother     Kidney disease Sister         ESRD    Diabetes Sister     Diabetes Maternal Grandmother     Cancer Neg Hx           ROS:  GENERAL: fever, chills with body aches  SKIN: No rashes, itching or changes in color or texture of skin.   HEENT:  Reports runny nose, nasal congestion, postnasal drip, dizziness, earache, ears popping, headache  NODES: No masses or lesions. Denies swollen glands.   CHEST: reports cough   CARDIOVASCULAR: Denies chest pain, shortness of breath.  ABDOMEN: Appetite fine. No weight loss. Denies diarrhea, abdominal pain  MUSCULOSKELETAL: No joint stiffness or swelling. Denies back pain.  NEUROLOGIC: No history of seizures, paralysis, alteration of gait or coordination.  PSYCHIATRIC: Denies mood swings, depression or suicidal thoughts.    PE:   APPEARANCE: Well nourished, well developed, in moderate distress. Hoarseness  V/S: Reviewed.  SKIN: Normal skin turgor, no lesions.  HEENT: Turbinates injected, minimal red pharynx,  TM's with effusions, & poor light reflex bilateral, minimal facial tenderness.  CHEST: Lungs clear to auscultation. No wheezing  CARDIOVASCULAR: Regular rate and rhythm.  NEUROLOGIC: No sensory deficits. Gait & Posture: Normal, No cerebellar signs.  MENTAL STATUS: Patient alert, oriented x 3 & conversant.    PLAN: chest x-ray, POCT Influenza screen  Advise increase p.o. fluids--water/juice & rest  Meds:  Augmentin, Flonase, Phenergan DM / no refills  Simply saline nasal wash to irrigate sinuses and for congestion/runny nose.  Cool mist humidifier/vaporizer.  Practice good handwashing.  Tylenol or Ibuprofen for fever, headache and body aches.  Warm salt water gargles for throat comfort.  Chloraseptic spray or lozenges for throat comfort.  Advise follow up with PCP in 2-3 days for recheck  Advise go to ER if symptoms worsen or fail to improve with treatment.  Instructions, follow up, and supportive care as per AVS.  AVS provided and reviewed with patient including supportive care, follow up, and red flag symptoms.   Patient verbalizes understanding and agrees with treatment plan. Discharged  from Urgent Care in stable condition.  .      DIAGNOSIS:  Fever  Body aches  Cough vs pneumonia  Acute bacterial sinusitis  History of kidney transplant  Hypertension associated with stage III CKD due to type 2 diabetes mellitus

## 2020-01-02 NOTE — TELEPHONE ENCOUNTER
----- Message from Estrella Reagan sent at 1/2/2020  7:52 AM CST -----  Contact: pt  Pt would like a call back in regards to appt access and can be reached at [phone]    Thanks,  Estrella Reagan

## 2020-01-13 ENCOUNTER — OFFICE VISIT (OUTPATIENT)
Dept: CARDIOLOGY | Facility: CLINIC | Age: 65
End: 2020-01-13
Payer: COMMERCIAL

## 2020-01-13 VITALS
SYSTOLIC BLOOD PRESSURE: 112 MMHG | OXYGEN SATURATION: 95 % | DIASTOLIC BLOOD PRESSURE: 70 MMHG | BODY MASS INDEX: 42.6 KG/M2 | HEART RATE: 118 BPM | HEIGHT: 68 IN | WEIGHT: 281.06 LBS

## 2020-01-13 DIAGNOSIS — I25.10 CORONARY ARTERY DISEASE INVOLVING NATIVE CORONARY ARTERY OF NATIVE HEART WITHOUT ANGINA PECTORIS: ICD-10-CM

## 2020-01-13 DIAGNOSIS — I82.492 ACUTE DEEP VEIN THROMBOSIS (DVT) OF OTHER SPECIFIED VEIN OF LEFT LOWER EXTREMITY: ICD-10-CM

## 2020-01-13 DIAGNOSIS — I50.42 CHRONIC COMBINED SYSTOLIC AND DIASTOLIC CONGESTIVE HEART FAILURE: Primary | ICD-10-CM

## 2020-01-13 DIAGNOSIS — Z94.0 KIDNEY TRANSPLANT STATUS, LIVING RELATED DONOR: Chronic | ICD-10-CM

## 2020-01-13 DIAGNOSIS — N18.30 HYPERTENSION ASSOCIATED WITH STAGE 3 CHRONIC KIDNEY DISEASE DUE TO TYPE 2 DIABETES MELLITUS: ICD-10-CM

## 2020-01-13 DIAGNOSIS — I12.9 HYPERTENSION ASSOCIATED WITH STAGE 3 CHRONIC KIDNEY DISEASE DUE TO TYPE 2 DIABETES MELLITUS: ICD-10-CM

## 2020-01-13 DIAGNOSIS — E11.22 HYPERTENSION ASSOCIATED WITH STAGE 3 CHRONIC KIDNEY DISEASE DUE TO TYPE 2 DIABETES MELLITUS: ICD-10-CM

## 2020-01-13 DIAGNOSIS — N40.0 BENIGN PROSTATIC HYPERPLASIA WITHOUT LOWER URINARY TRACT SYMPTOMS: ICD-10-CM

## 2020-01-13 DIAGNOSIS — N18.30 CKD (CHRONIC KIDNEY DISEASE) STAGE 3, GFR 30-59 ML/MIN: ICD-10-CM

## 2020-01-13 DIAGNOSIS — E11.21 TYPE 2 DIABETES MELLITUS WITH DIABETIC NEPHROPATHY, UNSPECIFIED WHETHER LONG TERM INSULIN USE: ICD-10-CM

## 2020-01-13 PROCEDURE — 3078F PR MOST RECENT DIASTOLIC BLOOD PRESSURE < 80 MM HG: ICD-10-PCS | Mod: CPTII,S$GLB,, | Performed by: INTERNAL MEDICINE

## 2020-01-13 PROCEDURE — 1101F PR PT FALLS ASSESS DOC 0-1 FALLS W/OUT INJ PAST YR: ICD-10-PCS | Mod: CPTII,S$GLB,, | Performed by: INTERNAL MEDICINE

## 2020-01-13 PROCEDURE — 3078F DIAST BP <80 MM HG: CPT | Mod: CPTII,S$GLB,, | Performed by: INTERNAL MEDICINE

## 2020-01-13 PROCEDURE — 99214 OFFICE O/P EST MOD 30 MIN: CPT | Mod: S$GLB,,, | Performed by: INTERNAL MEDICINE

## 2020-01-13 PROCEDURE — 99999 PR PBB SHADOW E&M-EST. PATIENT-LVL III: ICD-10-PCS | Mod: PBBFAC,,, | Performed by: INTERNAL MEDICINE

## 2020-01-13 PROCEDURE — 3074F SYST BP LT 130 MM HG: CPT | Mod: CPTII,S$GLB,, | Performed by: INTERNAL MEDICINE

## 2020-01-13 PROCEDURE — 1159F MED LIST DOCD IN RCRD: CPT | Mod: S$GLB,,, | Performed by: INTERNAL MEDICINE

## 2020-01-13 PROCEDURE — 1159F PR MEDICATION LIST DOCUMENTED IN MEDICAL RECORD: ICD-10-PCS | Mod: S$GLB,,, | Performed by: INTERNAL MEDICINE

## 2020-01-13 PROCEDURE — 3074F PR MOST RECENT SYSTOLIC BLOOD PRESSURE < 130 MM HG: ICD-10-PCS | Mod: CPTII,S$GLB,, | Performed by: INTERNAL MEDICINE

## 2020-01-13 PROCEDURE — 99999 PR PBB SHADOW E&M-EST. PATIENT-LVL III: CPT | Mod: PBBFAC,,, | Performed by: INTERNAL MEDICINE

## 2020-01-13 PROCEDURE — 1126F PR PAIN SEVERITY QUANTIFIED, NO PAIN PRESENT: ICD-10-PCS | Mod: S$GLB,,, | Performed by: INTERNAL MEDICINE

## 2020-01-13 PROCEDURE — 1101F PT FALLS ASSESS-DOCD LE1/YR: CPT | Mod: CPTII,S$GLB,, | Performed by: INTERNAL MEDICINE

## 2020-01-13 PROCEDURE — 99214 PR OFFICE/OUTPT VISIT, EST, LEVL IV, 30-39 MIN: ICD-10-PCS | Mod: S$GLB,,, | Performed by: INTERNAL MEDICINE

## 2020-01-13 PROCEDURE — 1126F AMNT PAIN NOTED NONE PRSNT: CPT | Mod: S$GLB,,, | Performed by: INTERNAL MEDICINE

## 2020-01-13 NOTE — PROGRESS NOTES
Subjective:   Patient ID:  Mitch Whittaker is a 64 y.o. male who presents for cardiac consult of Follow-up      HPI  The patient came in today for cardiac consult of Follow-up      Mitch Whittaker is a 64 y.o. male with current medical conditions non obs CAD, DVT on Eliquis, HFrEF 45-50%, CKD, DM, MARION, HTN, HLD, ESRD s/p renal transplant presents for follow up CV eval.     3/15/19  Pt of Dr. Morris, last seen in clinic 2016. Pt presents after recent admission for CHF at [facility] -   Patient is a 63-year-old gentleman who presented to the hospital with shortness of breath. He was found to have pulmonary edema on chest x-ray and bilateral pedal edema. He was admitted for treatment of acute CHF. Echo showed reduced ejection fraction at 25-30%. He was treated with IV Lasix 80 mg twice daily that has been transitioned to Lasix p.o. Patient is not euvolemic and his creatinine is trending up for this reason I am cutting back on Lasix to 40 mg twice daily. Can follow-up with cardiology in 3 days. [facility] He was seen by Dr. Omayra sEparza from [facility]. Recommendation is to start him on Entresto once his renal functions stabilize.Pt had edema and SOB while in bed and then went to hospital. Prior to admission he was on lasix PRN. He will see Dr. Pacheco for nephro eval, transplant nephro Dr. Dejesus.  Has been feeling good on lasix 40 BID, has been walking well overall. Does not have CPAP machine.     5/13/19  He was started on Entresto after last visit as renal function improved/stabilized. Has seen Dr. Mason recently started on CPAP again. Toprol dec to 25 mg due to hypotension. Discussed will repeat echo in 1 month to evaluate LV function, will refer for ICD if EF remains low. He woke up with right sided back pain. He has been using CPAP for about a little over a week. Has been doing well lately overall, BP well controlled, no STEELE/LE lately. PT has mild forgetfulness but family at home has been taking  care of him closely.     7/5/19  ECHO last month EF improved to 45-50%. Will not need ICD now. He has LLE pain x 4 days. He has been using Tylenol and ICY HOT for pain relief. Pain started L calf. Is taking lasix 40mg daily. Feels dizzy and lightheaded with standing at times.     7/24/19  Last week - DVT noted on u/s. Started Eliquis 10 BID x 7 days and Eliquis 5 BID after. NO bleeding issues lately with Eliquis, had minor blood after insulin shot which resolved with pressure. He has been walking more lately. Late June he had leg pain and was not walking much since, he has a habit of sitting down for a long period of time. He is on Eliquis 5 BID dosing now.     10/11/19  His recent LE u/s with non occlusive thrombus in PTV in left but no thrombus in POP vein on left. He still feels L leg swelling. No SOB. Has been using CPAP machine regularly. He feels dizzy at times and when he gets up more lightheaded, discussed needs to eval with urologist to stop flomax/proscar.     1/13/20  Last u/s with non occ thrombus L posterior Tib vein. He has been having back pain recently. He has intermittent pain when he gets up and walks around. He has started walking more and started to ride. He had had a bad sinus infection/URI and was given PCN and cough syrup.     Patient feels no chest pain, no sob, no leg swelling, no PND, no palpitation,  no syncope, no CNS symptoms.    Patient has dec exercise tolerance.    Patient is compliant with medications.    ECG - NSR, sinus arrhythmia, inferolat TWI    LE US  Age Indeterminate non occlusive thrombus of the Posterior Tibial Vein.    CONCLUSIONS   Technically difficult study.     This document has been electronically    SIGNED BY: Fabiana Saleem MD On: 12/18/2019 17:18    RIGHT:  No evidence of Right lower extremity DVT.    LEFT:  Age Indeterminate DVT of the Posterior Tibial Vein.  In comparison to LEV done 7/19 there is no longer thrombus in the POP vein on the left, but there still  appears to be non occlusive thrombus seen in the PTV on the left.    CONCLUSIONS   Technically difficult study.     This document has been electronically    SIGNED BY: Darius Azevedo MD On: 09/23/2019 17:20     ECHO : 06/03/2019   CONCLUSIONS     1 - Concentric hypertrophy.     2 - Wall motion abnormalities.     3 - Mildly depressed left ventricular systolic function (EF 45-50%).     4 - Impaired LV relaxation, normal LAP (grade 1 diastolic dysfunction).     5 - Normal right ventricular systolic function .     6 - Trivial pericardial effusion.         Nuclear Stress 06/20/2018   Nuclear Quantitative Functional Analysis:   LVEF: 46 %  LVED Volume: 144 ml  LVES Volume: 91 ml    Impression: NORMAL MYOCARDIAL PERFUSION  1. The perfusion scan is free of evidence for myocardial ischemia or injury.   2. Resting wall motion is physiologic.   3. There is resting LV dysfunction with a reduced ejection fraction of 46 %.   4. The ventricular volumes are normal at rest and stress.   5. The extracardiac distribution of radioactivity is normal.       Past Medical History:   Diagnosis Date    Acquired renal cyst of left kidney     Anemia associated with chronic renal failure     CAD (coronary artery disease)     nonobstructive [facility] 9/14    CHF (congestive heart failure)     Chronic immunosuppression with Prograf and MMF 6/18/2015    CKD (chronic kidney disease) stage 3, GFR 30-59 ml/min     Diabetic retinopathy     DM (diabetes mellitus), type 2 with complications 1994    Edema     End stage kidney disease     s/p transplant, doing well    Gallbladder polyp     Heart failure, diastolic, due to HTN     Hemodialysis status     off since transplant    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     History of colon polyps     HPTH (hyperparathyroidism)     Hyperlipidemia     Hypertension associated with stage 3 chronic kidney disease due to type 2 diabetes mellitus     Obesity     PCO  "(posterior capsular opacification), left 3/4/2019    Proteinuria     resolved s/p transplant    S/P kidney transplant     Sleep apnea     Type 2 diabetes, uncontrolled, with retinopathy     Type II diabetes mellitus with renal manifestations        Past Surgical History:   Procedure Laterality Date    CARDIAC CATHETERIZATION      normal coronary    COLONOSCOPY N/A 2018    Procedure: COLONOSCOPY;  Surgeon: Chava Ronquillo MD;  Location: [facility];  Service: Endoscopy;  Laterality: N/A;    KIDNEY TRANSPLANT      RETINAL LASER PROCEDURE         Social History     Tobacco Use    Smoking status: Former Smoker     Last attempt to quit: 2013     Years since quittin.6    Smokeless tobacco: Former User     Quit date: 2013    Tobacco comment: used marijuana since [id number], stopped after started dialysis   Substance Use Topics    Alcohol use: No     Alcohol/week: 0.0 standard drinks    Drug use: No     Comment:         Family History   Problem Relation Age of Onset    Diabetes Mother     Hypertension Mother     Heart failure Mother     Kidney disease Sister         ESRD    Diabetes Sister     Diabetes Maternal Grandmother     Cancer Neg Hx        Patient's Medications   New Prescriptions    No medications on file   Previous Medications    APIXABAN (ELIQUIS) 5 MG TAB    Take 1 tablet (5 mg total) by mouth 2 (two) times daily.    ASPIRIN (ECOTRIN) 81 MG EC TABLET    Take 1 tablet by mouth Daily.     BD ULTRA-FINE MINI PEN NEEDLE 31 GAUGE X 3/16" NDLE    Use to inject insulin as needed up to 4 times daily    BISACODYL (DULCOLAX) 5 MG EC TABLET    Take 5 mg by mouth daily as needed for Constipation.    BLOOD SUGAR DIAGNOSTIC STRP    Use to test four times per day    ERGOCALCIFEROL (VITAMIN D2) 50,000 UNIT CAP    Take 1 capsule by mouth every 7 days    FAMOTIDINE (PEPCID) 20 MG TABLET    TAKE ONE TABLET BY MOUTH EVERY EVENING    FINASTERIDE (PROSCAR) 5 MG TABLET    Take 1 tablet (5 mg " "total) by mouth once daily.    FISH OIL-OMEGA-3 FATTY ACIDS 300-1,000 MG CAPSULE    Take 1 g by mouth.    FISH,BORA,FLAX OILS-OM3,6,9NO1 1,200 MG CAP    Take by mouth.    FLUTICASONE PROPIONATE (FLONASE) 50 MCG/ACTUATION NASAL SPRAY    2 sprays (100 mcg total) by Each Nostril route once daily.    FUROSEMIDE (LASIX) 40 MG TABLET    Take 1 tablet (40 mg total) by mouth once daily.    FUROSEMIDE (LASIX) 80 MG TABLET    Take one-half tablet (40 mg) by mouth 2 (two) times daily.    GLIMEPIRIDE (AMARYL) 4 MG TABLET    Take 1 tablet (4 mg total) by mouth before breakfast.    INSULIN (LANTUS SOLOSTAR U-100 INSULIN) GLARGINE 100 UNITS/ML (3ML) SUBQ PEN    Inject 35 Units into the skin 2 (two) times daily.    INSULIN ASPART U-100 (NOVOLOG FLEXPEN U-100 INSULIN) 100 UNIT/ML (3 ML) INPN PEN    Inject 25 Units into the skin 3 (three) times daily with meals.    INSULIN GLARGINE 100 UNITS/ML (3ML) SUBQ PEN    Inject 30 Units into the skin 2 (two) times daily.    KETOCONAZOLE (NIZORAL) 200 MG TAB    Take 0.5 tablets (100 mg total) by mouth once daily.    LANCETS 33 GAUGE MISC    1 Stick by Misc.(Non-Drug; Combo Route) route as directed. Contour lancets. Use to check BG 2-3 times daily.    METOPROLOL SUCCINATE (TOPROL-XL) 25 MG 24 HR TABLET    Take 1 tablet (25 mg total) by mouth once daily.    MULTIVITAMIN (THERA) TABLET    Take 1 tablet by mouth.    MYCOPHENOLATE (CELLCEPT) 250 MG CAP    Take 3 capsules (750 mg total) by mouth 2 (two) times daily.    OMEGA-3 FATTY ACIDS-VITAMIN E (FISH OIL) 1,000 MG CAP    Take 1 capsule by mouth once daily.     PEN NEEDLE, DIABETIC (BD INSULIN PEN NEEDLE UF SHORT) 31 GAUGE X 5/16" NDLE    USE TO INJECT INSULIN TWICE A DAY    POLYETHYLENE GLYCOL (GLYCOLAX) 17 GRAM PWPK    Take 17 grams by mouth daily for 7 days.    PREDNISONE (DELTASONE) 5 MG TABLET    Take 1 tablet (5 mg total) by mouth once daily.    PROMETHAZINE-DEXTROMETHORPHAN (PROMETHAZINE-DM) 6.25-15 MG/5 ML SYRP    Take 5 mLs by mouth " "every 6 to 8 hours as needed.    ROSUVASTATIN (CRESTOR) 40 MG TAB    TAKE ONE TABLET BY MOUTH EVERY EVENING    SACUBITRIL-VALSARTAN (ENTRESTO) 24-26 MG PER TABLET    Take 1 tablet by mouth 2 (two) times daily.    TACROLIMUS (PROGRAF) 1 MG CAP    Take 4 capsules (4 mg total) by mouth every 12 (twelve) hours.    TAMSULOSIN (FLOMAX) 0.4 MG CAP    Take 1 capsule (0.4 mg total) by mouth once daily.   Modified Medications    No medications on file   Discontinued Medications    No medications on file       Review of Systems   Constitutional: Positive for malaise/fatigue.   HENT: Negative.    Eyes: Negative.    Respiratory: Negative.    Cardiovascular: Positive for leg swelling. Negative for chest pain and palpitations.   Gastrointestinal: Negative.    Genitourinary: Negative.    Musculoskeletal: Positive for back pain.   Skin: Negative.    Neurological: Positive for dizziness.   Endo/Heme/Allergies: Negative.    Psychiatric/Behavioral: Negative.    All 12 systems otherwise negative.      Wt Readings from Last 3 Encounters:   01/13/20 127.5 kg (281 lb 1.4 oz)   01/02/20 127.5 kg (281 lb 1.4 oz)   11/18/19 126.7 kg (279 lb 5.2 oz)     Temp Readings from Last 3 Encounters:   01/02/20 99.3 °F (37.4 °C) (Tympanic)   11/18/19 97.9 °F (36.6 °C) (Tympanic)   09/26/19 97.5 °F (36.4 °C) (Tympanic)     BP Readings from Last 3 Encounters:   01/13/20 112/70   01/02/20 130/66   11/18/19 104/68     Pulse Readings from Last 3 Encounters:   01/13/20 (!) 118   01/02/20 99   11/18/19 96       /70 (BP Location: Left arm, Patient Position: Sitting, BP Method: Large (Manual))   Pulse (!) 118   Ht 5' 8" (1.727 m)   Wt 127.5 kg (281 lb 1.4 oz)   SpO2 95%   BMI 42.74 kg/m²     Objective:   Physical Exam   Constitutional: He is oriented to person, place, and time. He appears well-developed and well-nourished. No distress.   HENT:   Head: Normocephalic and atraumatic.   Nose: Nose normal.   Mouth/Throat: Oropharynx is clear and moist. "   Eyes: Conjunctivae and EOM are normal. No scleral icterus.   Neck: Normal range of motion. Neck supple. No JVD present. No thyromegaly present.   Cardiovascular: Normal rate, regular rhythm, S1 normal and S2 normal. Exam reveals no gallop, no S3, no S4 and no friction rub.   No murmur heard.  Pulmonary/Chest: Effort normal and breath sounds normal. No stridor. No respiratory distress. He has no wheezes. He has no rales. He exhibits no tenderness.   Abdominal: Soft. Bowel sounds are normal. He exhibits no distension and no mass. There is no tenderness. There is no rebound.   Genitourinary:   Genitourinary Comments: Deferred   Musculoskeletal: Normal range of motion. He exhibits edema. He exhibits no tenderness or deformity.   Lymphadenopathy:     He has no cervical adenopathy.   Neurological: He is alert and oriented to person, place, and time. He exhibits normal muscle tone. Coordination normal.   Skin: Skin is warm and dry. No rash noted. He is not diaphoretic. No erythema. No pallor.   Psychiatric: He has a normal mood and affect. His behavior is normal. Judgment and thought content normal.   Nursing note and vitals reviewed.      Lab Results   Component Value Date     12/19/2019    K 3.5 12/19/2019     12/19/2019    CO2 32 (H) 12/19/2019    BUN 19 12/19/2019    CREATININE 1.6 (H) 12/19/2019    GLU 93 12/19/2019    HGBA1C 8.0 (H) 12/19/2019    MG 1.7 06/03/2019    AST 17 10/03/2019    ALT 13 10/03/2019    ALBUMIN 3.7 10/03/2019    PROT 6.7 10/03/2019    BILITOT 0.5 10/03/2019    WBC 7.97 10/03/2019    HGB 13.2 (L) 10/03/2019    HCT 47.3 10/03/2019    HCT 36 06/12/2015    MCV 90 10/03/2019     10/03/2019    INR 1.0 06/18/2015    TSH 0.734 10/03/2019    CHOL 176 12/19/2019    HDL 58 12/19/2019    LDLCALC 98.2 12/19/2019    TRIG 99 12/19/2019     (H) 12/04/2018     Assessment:      1. Chronic combined systolic and diastolic congestive heart failure    2. Hypertension associated with stage  3 chronic kidney disease due to type 2 diabetes mellitus    3. Coronary artery disease involving native coronary artery of native heart without angina pectoris    4. CKD (chronic kidney disease) stage 3, GFR 30-59 ml/min    5. Living-related donor kidney transplant (daughter) - 6/12/15    6. Benign prostatic hyperplasia without lower urinary tract symptoms    7. Type 2 diabetes mellitus with diabetic nephropathy, unspecified whether long term insulin use    8. Acute deep vein thrombosis (DVT) of other specified vein of left lower extremity        Plan:   1. Chronic CHF EF 45-50%  - cont lasix, and extra PRN, pt euvolemic   - cont Toprol and Entresto  - cont low salt diet, fluid restriction    2. HTN   - cont meds for afterload reduction   - low salt diet    3. HLD  - cont statin    4. ESRD s/p Transplant with CKD  - f/u with nephro    5. CAD, non obs  - cont meds  - stress 6/2018 negative    6. MARION  -cont CPAP    7. Obesity  -rec weight loss with diet/exercise     8. DVT  - cont Eliquis 5 BID  - non occlusive thrombus in PTV in left but no thrombus in POP vein on left.    9. Dizziness  - likeky sec to Flomax and Proscar, can discuss with urologist if can stop    Thank you for allowing me to participate in this patient's care. Please do not hesitate to contact me with any questions or concerns. Consult note has been forwarded to the referral physician.

## 2020-02-10 ENCOUNTER — PATIENT OUTREACH (OUTPATIENT)
Dept: ADMINISTRATIVE | Facility: HOSPITAL | Age: 65
End: 2020-02-10

## 2020-02-18 DIAGNOSIS — Z94.0 KIDNEY REPLACED BY TRANSPLANT: ICD-10-CM

## 2020-02-19 RX ORDER — TACROLIMUS 1 MG/1
4 CAPSULE ORAL EVERY 12 HOURS
Qty: 240 CAPSULE | Refills: 11 | Status: SHIPPED | OUTPATIENT
Start: 2020-02-19 | End: 2020-09-11 | Stop reason: SDUPTHER

## 2020-02-21 ENCOUNTER — OFFICE VISIT (OUTPATIENT)
Dept: OPHTHALMOLOGY | Facility: CLINIC | Age: 65
End: 2020-02-21
Payer: COMMERCIAL

## 2020-02-21 DIAGNOSIS — Z79.4 TYPE 2 DIABETES MELLITUS WITH STABLE PROLIFERATIVE RETINOPATHY OF BOTH EYES, WITH LONG-TERM CURRENT USE OF INSULIN: Primary | ICD-10-CM

## 2020-02-21 DIAGNOSIS — H35.373 EPIRETINAL MEMBRANE (ERM) OF BOTH EYES: ICD-10-CM

## 2020-02-21 DIAGNOSIS — E11.3553 TYPE 2 DIABETES MELLITUS WITH STABLE PROLIFERATIVE RETINOPATHY OF BOTH EYES, WITH LONG-TERM CURRENT USE OF INSULIN: Primary | ICD-10-CM

## 2020-02-21 PROCEDURE — 92134 CPTRZ OPH DX IMG PST SGM RTA: CPT | Mod: S$GLB,,, | Performed by: OPHTHALMOLOGY

## 2020-02-21 PROCEDURE — 92014 COMPRE OPH EXAM EST PT 1/>: CPT | Mod: S$GLB,,, | Performed by: OPHTHALMOLOGY

## 2020-02-21 PROCEDURE — 92134 POSTERIOR SEGMENT OCT RETINA (OCULAR COHERENCE TOMOGRAPHY)-BOTH EYES: ICD-10-PCS | Mod: S$GLB,,, | Performed by: OPHTHALMOLOGY

## 2020-02-21 PROCEDURE — 99999 PR PBB SHADOW E&M-EST. PATIENT-LVL II: CPT | Mod: PBBFAC,,, | Performed by: OPHTHALMOLOGY

## 2020-02-21 PROCEDURE — 92014 PR EYE EXAM, EST PATIENT,COMPREHESV: ICD-10-PCS | Mod: S$GLB,,, | Performed by: OPHTHALMOLOGY

## 2020-02-21 PROCEDURE — 99999 PR PBB SHADOW E&M-EST. PATIENT-LVL II: ICD-10-PCS | Mod: PBBFAC,,, | Performed by: OPHTHALMOLOGY

## 2020-02-21 NOTE — PROGRESS NOTES
===============================  Mitch Whittaker,  2/21/2020 today   64 y.o. male   Last visit [facility]: :9/20/2019   Last visit eye dept. 9/20/2019  [facility]:  Uncorrected distance visual acuity was 20/20 in the right eye and 20/20 in the left eye.  Tonometry     Tonometry (Applanation, 9:34 AM)       Right Left    Pressure 22 21               Not recorded         Not recorded         Not recorded        Chief Complaint   Patient presents with    Diabetes     5 months check up       ________________  2/21/2020 today  HPI     Diabetes      Additional comments: 5 months check up              Comments     C/o floaters OS    DM since approx 1996  PDR S/P PRP OU  Old Focal OU  Old OS TRD  ERM OU  PCIOL OU w/YAG OS 3/21/19  KIDNEY TRANSPLANT 6/12/15          Last edited by BETTY Zuniga MD on 2/21/2020 10:09 AM. (History)      Problem List Items Addressed This Visit        Eye/Vision problems    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, with long-term current use of insulin - Primary    Overview     DM since 1996  H/o PRP and Focal OU  OS TRD  H/O Kidney transplant 6/12/15  A1C 8.0 12/19/19         Relevant Orders    Posterior Segment OCT Retina-Both eyes    Epiretinal membrane (ERM) of both eyes    Relevant Orders    Posterior Segment OCT Retina-Both eyes      Other Visit Diagnoses     Uncontrolled type 2 diabetes mellitus with insulin therapy              .Stable, inactive post PDR  ERM stable OU  rtc 1 year      ===========================

## 2020-02-28 ENCOUNTER — TELEPHONE (OUTPATIENT)
Dept: INTERNAL MEDICINE | Facility: CLINIC | Age: 65
End: 2020-02-28

## 2020-02-28 DIAGNOSIS — E11.42 DIABETIC PERIPHERAL NEUROPATHY: Primary | ICD-10-CM

## 2020-02-28 DIAGNOSIS — E11.8 DM (DIABETES MELLITUS), TYPE 2 WITH COMPLICATIONS: ICD-10-CM

## 2020-02-28 NOTE — TELEPHONE ENCOUNTER
----- Message from Channing Cohen sent at 2/28/2020  2:18 PM CST -----  Contact: Pt  Pt is requesting orders for labs. Please give him a call at [phone]

## 2020-02-28 NOTE — TELEPHONE ENCOUNTER
Patient would like to have blood work done prior to appointment on 3/26. Please put orders in. Thank you!

## 2020-03-06 RX ORDER — KETOCONAZOLE 200 MG/1
100 TABLET ORAL DAILY
Qty: 15 TABLET | Refills: 9 | Status: CANCELLED | OUTPATIENT
Start: 2020-03-06

## 2020-03-06 RX ORDER — KETOCONAZOLE 200 MG/1
100 TABLET ORAL DAILY
Qty: 15 TABLET | Refills: 9
Start: 2020-03-06 | End: 2020-12-16 | Stop reason: SDUPTHER

## 2020-03-06 NOTE — TELEPHONE ENCOUNTER
----- Message from Rosio Jones sent at 3/6/2020  2:32 PM CST -----  Contact: self  Patient requesting that his prescription for ketoconazole (NIZORAL) 200 mg Tab be sent to the correct pharmacy. Please send to the pharmacy listed below. Call back number is [phone]    Pt uses     [facility] [address] [address]  Phone: [phone] Fax: [phone]

## 2020-03-11 RX ORDER — METOPROLOL SUCCINATE 25 MG/1
25 TABLET, EXTENDED RELEASE ORAL DAILY
Qty: 30 TABLET | Refills: 11 | Status: SHIPPED | OUTPATIENT
Start: 2020-03-11 | End: 2021-03-08 | Stop reason: SDUPTHER

## 2020-03-12 DIAGNOSIS — I50.42 CHRONIC COMBINED SYSTOLIC AND DIASTOLIC CONGESTIVE HEART FAILURE: ICD-10-CM

## 2020-04-07 DIAGNOSIS — R50.9 FEVER, UNSPECIFIED FEVER CAUSE: ICD-10-CM

## 2020-04-07 DIAGNOSIS — R05.9 COUGH: ICD-10-CM

## 2020-04-07 DIAGNOSIS — B96.89 ACUTE BACTERIAL SINUSITIS: ICD-10-CM

## 2020-04-07 DIAGNOSIS — J01.90 ACUTE BACTERIAL SINUSITIS: ICD-10-CM

## 2020-04-07 DIAGNOSIS — R52 GENERALIZED BODY ACHES: ICD-10-CM

## 2020-04-08 ENCOUNTER — OFFICE VISIT (OUTPATIENT)
Dept: INTERNAL MEDICINE | Facility: CLINIC | Age: 65
End: 2020-04-08
Payer: COMMERCIAL

## 2020-04-08 DIAGNOSIS — J30.1 ALLERGIC RHINITIS DUE TO POLLEN, UNSPECIFIED SEASONALITY: Primary | ICD-10-CM

## 2020-04-08 PROCEDURE — 1159F PR MEDICATION LIST DOCUMENTED IN MEDICAL RECORD: ICD-10-PCS | Mod: ,,, | Performed by: INTERNAL MEDICINE

## 2020-04-08 PROCEDURE — 99213 PR OFFICE/OUTPT VISIT, EST, LEVL III, 20-29 MIN: ICD-10-PCS | Mod: 95,,, | Performed by: INTERNAL MEDICINE

## 2020-04-08 PROCEDURE — 1159F MED LIST DOCD IN RCRD: CPT | Mod: ,,, | Performed by: INTERNAL MEDICINE

## 2020-04-08 PROCEDURE — 99213 OFFICE O/P EST LOW 20 MIN: CPT | Mod: 95,,, | Performed by: INTERNAL MEDICINE

## 2020-04-08 PROCEDURE — 1101F PT FALLS ASSESS-DOCD LE1/YR: CPT | Mod: CPTII,,, | Performed by: INTERNAL MEDICINE

## 2020-04-08 PROCEDURE — 1101F PR PT FALLS ASSESS DOC 0-1 FALLS W/OUT INJ PAST YR: ICD-10-PCS | Mod: CPTII,,, | Performed by: INTERNAL MEDICINE

## 2020-04-08 RX ORDER — PROMETHAZINE HYDROCHLORIDE AND DEXTROMETHORPHAN HYDROBROMIDE 6.25; 15 MG/5ML; MG/5ML
5 SYRUP ORAL
Qty: 120 ML | Refills: 0 | Status: SHIPPED | OUTPATIENT
Start: 2020-04-08 | End: 2021-12-06 | Stop reason: ALTCHOICE

## 2020-04-08 NOTE — PROGRESS NOTES
The patient location is: home  The chief complaint leading to consultation is: allergic rhinits  Visit type: Virtual visit with synchronous audio and video  Total time spent with patient: 5 min  Each patient to whom he or she provides medical services by telemedicine is:  (1) informed of the relationship between the physician and patient and the respective role of any other health care provider with respect to management of the patient; and (2) notified that he or she may decline to receive medical services by telemedicine and may withdraw from such care at any time.     HPI:  Patient is a 64-year-old man who is seen via telemedicine.  He states that when he goes outside to mow the yd he have lot of nasal congestion and coughing for the next 24-72 hr.  He only has been using his Flonase intermittently.  He is not taking any antihistamines.    Current meds have been verified and updated per the EMR  Exam:  Patient looks comfortable.  No signs of respiratory distress      Lab Results   Component Value Date    WBC 7.97 10/03/2019    HGB 13.2 (L) 10/03/2019    HCT 47.3 10/03/2019     10/03/2019    CHOL 176 12/19/2019    TRIG 99 12/19/2019    HDL 58 12/19/2019    ALT 13 10/03/2019    AST 17 10/03/2019     12/19/2019    K 3.5 12/19/2019     12/19/2019    CREATININE 1.6 (H) 12/19/2019    BUN 19 12/19/2019    CO2 32 (H) 12/19/2019    TSH 0.734 10/03/2019    PSA 0.31 04/04/2019    INR 1.0 06/18/2015    HGBA1C 8.0 (H) 12/19/2019       Impression:  Chronic allergic rhinitis  Patient Active Problem List   Diagnosis    Anemia associated with chronic renal failure    Obesity    Acquired renal cyst of left kidney    Nephrolithiasis    Sleep apnea    Pseudophakia    CAD (coronary artery disease)    Living-related donor kidney transplant (daughter) - 6/12/15    Diabetic peripheral neuropathy    Chronic immunosuppression with Prograf, MMF and prednisone    Secondary hyperparathyroidism of renal origin     CKD (chronic kidney disease) stage 3, GFR 30-59 ml/min    Type II diabetes mellitus with renal manifestations    Hypertension associated with stage 3 chronic kidney disease due to type 2 diabetes mellitus    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    DM (diabetes mellitus), type 2 with complications    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, with long-term current use of insulin    Epiretinal membrane (ERM) of both eyes    History of colon polyps    Benign prostatic hyperplasia without lower urinary tract symptoms    PCO (posterior capsular opacification), left    Chronic combined systolic and diastolic congestive heart failure    Deep vein thrombosis (DVT) of lower extremity       Plan:    he was told he needs to take the Flonase every day.  He will also take Claritin as well.      This note is generated with speech recognition software and is subject to transcription error and sound alike phrases that may be missed by proofreading.

## 2020-04-13 RX ORDER — TAMSULOSIN HYDROCHLORIDE 0.4 MG/1
1 CAPSULE ORAL DAILY
Qty: 30 CAPSULE | Refills: 11 | Status: SHIPPED | OUTPATIENT
Start: 2020-04-13 | End: 2021-03-08 | Stop reason: SDUPTHER

## 2020-04-13 RX ORDER — ROSUVASTATIN CALCIUM 40 MG/1
TABLET, COATED ORAL
Qty: 90 TABLET | Refills: 1 | Status: SHIPPED | OUTPATIENT
Start: 2020-04-13 | End: 2020-10-11 | Stop reason: SDUPTHER

## 2020-05-20 RX ORDER — INSULIN ASPART 100 [IU]/ML
25 INJECTION, SOLUTION INTRAVENOUS; SUBCUTANEOUS
Qty: 30 ML | Refills: 11 | Status: SHIPPED | OUTPATIENT
Start: 2020-05-20 | End: 2021-07-13 | Stop reason: SDUPTHER

## 2020-06-05 RX ORDER — GLIMEPIRIDE 4 MG/1
4 TABLET ORAL
Qty: 90 TABLET | Refills: 3 | Status: SHIPPED | OUTPATIENT
Start: 2020-06-05 | End: 2021-06-02 | Stop reason: SDUPTHER

## 2020-07-01 ENCOUNTER — LAB VISIT (OUTPATIENT)
Dept: LAB | Facility: HOSPITAL | Age: 65
End: 2020-07-01
Attending: INTERNAL MEDICINE
Payer: COMMERCIAL

## 2020-07-01 DIAGNOSIS — E11.8 DM (DIABETES MELLITUS), TYPE 2 WITH COMPLICATIONS: ICD-10-CM

## 2020-07-01 LAB
ALBUMIN SERPL BCP-MCNC: 3.5 G/DL (ref 3.5–5.2)
ALP SERPL-CCNC: 77 U/L (ref 55–135)
ALT SERPL W/O P-5'-P-CCNC: 18 U/L (ref 10–44)
ANION GAP SERPL CALC-SCNC: 15 MMOL/L (ref 8–16)
AST SERPL-CCNC: 16 U/L (ref 10–40)
BASOPHILS # BLD AUTO: 0.02 K/UL (ref 0–0.2)
BASOPHILS NFR BLD: 0.3 % (ref 0–1.9)
BILIRUB SERPL-MCNC: 0.6 MG/DL (ref 0.1–1)
BUN SERPL-MCNC: 17 MG/DL (ref 8–23)
CALCIUM SERPL-MCNC: 9.1 MG/DL (ref 8.7–10.5)
CHLORIDE SERPL-SCNC: 103 MMOL/L (ref 95–110)
CHOLEST SERPL-MCNC: 157 MG/DL (ref 120–199)
CHOLEST/HDLC SERPL: 2.9 {RATIO} (ref 2–5)
CO2 SERPL-SCNC: 26 MMOL/L (ref 23–29)
CREAT SERPL-MCNC: 1.7 MG/DL (ref 0.5–1.4)
DIFFERENTIAL METHOD: ABNORMAL
EOSINOPHIL # BLD AUTO: 0 K/UL (ref 0–0.5)
EOSINOPHIL NFR BLD: 0.4 % (ref 0–8)
ERYTHROCYTE [DISTWIDTH] IN BLOOD BY AUTOMATED COUNT: 13.2 % (ref 11.5–14.5)
EST. GFR  (AFRICAN AMERICAN): 47.5 ML/MIN/1.73 M^2
EST. GFR  (NON AFRICAN AMERICAN): 41.1 ML/MIN/1.73 M^2
GLUCOSE SERPL-MCNC: 144 MG/DL (ref 70–110)
HCT VFR BLD AUTO: 42 % (ref 40–54)
HDLC SERPL-MCNC: 55 MG/DL (ref 40–75)
HDLC SERPL: 35 % (ref 20–50)
HGB BLD-MCNC: 12.6 G/DL (ref 14–18)
IMM GRANULOCYTES # BLD AUTO: 0.08 K/UL (ref 0–0.04)
IMM GRANULOCYTES NFR BLD AUTO: 1 % (ref 0–0.5)
LDLC SERPL CALC-MCNC: 81.6 MG/DL (ref 63–159)
LYMPHOCYTES # BLD AUTO: 1.1 K/UL (ref 1–4.8)
LYMPHOCYTES NFR BLD: 14 % (ref 18–48)
MCH RBC QN AUTO: 25.9 PG (ref 27–31)
MCHC RBC AUTO-ENTMCNC: 30 G/DL (ref 32–36)
MCV RBC AUTO: 86 FL (ref 82–98)
MONOCYTES # BLD AUTO: 0.8 K/UL (ref 0.3–1)
MONOCYTES NFR BLD: 9.6 % (ref 4–15)
NEUTROPHILS # BLD AUTO: 5.8 K/UL (ref 1.8–7.7)
NEUTROPHILS NFR BLD: 74.7 % (ref 38–73)
NONHDLC SERPL-MCNC: 102 MG/DL
NRBC BLD-RTO: 0 /100 WBC
PLATELET # BLD AUTO: 176 K/UL (ref 150–350)
PMV BLD AUTO: 12.1 FL (ref 9.2–12.9)
POTASSIUM SERPL-SCNC: 3.3 MMOL/L (ref 3.5–5.1)
PROT SERPL-MCNC: 6.7 G/DL (ref 6–8.4)
RBC # BLD AUTO: 4.86 M/UL (ref 4.6–6.2)
SODIUM SERPL-SCNC: 144 MMOL/L (ref 136–145)
TRIGL SERPL-MCNC: 102 MG/DL (ref 30–150)
TSH SERPL DL<=0.005 MIU/L-ACNC: 0.76 UIU/ML (ref 0.4–4)
WBC # BLD AUTO: 7.79 K/UL (ref 3.9–12.7)

## 2020-07-01 PROCEDURE — 80053 COMPREHEN METABOLIC PANEL: CPT

## 2020-07-01 PROCEDURE — 84443 ASSAY THYROID STIM HORMONE: CPT

## 2020-07-01 PROCEDURE — 85025 COMPLETE CBC W/AUTO DIFF WBC: CPT

## 2020-07-01 PROCEDURE — 83036 HEMOGLOBIN GLYCOSYLATED A1C: CPT

## 2020-07-01 PROCEDURE — 80061 LIPID PANEL: CPT

## 2020-07-01 PROCEDURE — 36415 COLL VENOUS BLD VENIPUNCTURE: CPT | Mod: PO

## 2020-07-02 LAB
ESTIMATED AVG GLUCOSE: 220 MG/DL (ref 68–131)
HBA1C MFR BLD HPLC: 9.3 % (ref 4–5.6)

## 2020-07-08 DIAGNOSIS — I50.42 CHRONIC COMBINED SYSTOLIC AND DIASTOLIC CONGESTIVE HEART FAILURE: ICD-10-CM

## 2020-07-08 RX ORDER — SACUBITRIL AND VALSARTAN 24; 26 MG/1; MG/1
1 TABLET, FILM COATED ORAL 2 TIMES DAILY
Qty: 60 TABLET | Refills: 3 | Status: SHIPPED | OUTPATIENT
Start: 2020-07-08 | End: 2020-07-15 | Stop reason: SDUPTHER

## 2020-07-13 ENCOUNTER — PATIENT OUTREACH (OUTPATIENT)
Dept: ADMINISTRATIVE | Facility: OTHER | Age: 65
End: 2020-07-13

## 2020-07-15 ENCOUNTER — OFFICE VISIT (OUTPATIENT)
Dept: CARDIOLOGY | Facility: CLINIC | Age: 65
End: 2020-07-15
Payer: COMMERCIAL

## 2020-07-15 VITALS
DIASTOLIC BLOOD PRESSURE: 80 MMHG | WEIGHT: 289.69 LBS | HEART RATE: 86 BPM | OXYGEN SATURATION: 98 % | BODY MASS INDEX: 43.9 KG/M2 | SYSTOLIC BLOOD PRESSURE: 138 MMHG | HEIGHT: 68 IN

## 2020-07-15 DIAGNOSIS — I25.10 CORONARY ARTERY DISEASE INVOLVING NATIVE CORONARY ARTERY OF NATIVE HEART WITHOUT ANGINA PECTORIS: Primary | ICD-10-CM

## 2020-07-15 DIAGNOSIS — G47.33 OBSTRUCTIVE SLEEP APNEA SYNDROME: ICD-10-CM

## 2020-07-15 DIAGNOSIS — I12.9 HYPERTENSION ASSOCIATED WITH STAGE 3 CHRONIC KIDNEY DISEASE DUE TO TYPE 2 DIABETES MELLITUS: ICD-10-CM

## 2020-07-15 DIAGNOSIS — Z94.0 KIDNEY TRANSPLANT STATUS, LIVING RELATED DONOR: Chronic | ICD-10-CM

## 2020-07-15 DIAGNOSIS — E11.21 TYPE 2 DIABETES MELLITUS WITH DIABETIC NEPHROPATHY, UNSPECIFIED WHETHER LONG TERM INSULIN USE: ICD-10-CM

## 2020-07-15 DIAGNOSIS — E11.3553 TYPE 2 DIABETES MELLITUS WITH STABLE PROLIFERATIVE RETINOPATHY OF BOTH EYES, WITH LONG-TERM CURRENT USE OF INSULIN: ICD-10-CM

## 2020-07-15 DIAGNOSIS — I82.492 ACUTE DEEP VEIN THROMBOSIS (DVT) OF OTHER SPECIFIED VEIN OF LEFT LOWER EXTREMITY: ICD-10-CM

## 2020-07-15 DIAGNOSIS — N18.30 CKD (CHRONIC KIDNEY DISEASE) STAGE 3, GFR 30-59 ML/MIN: ICD-10-CM

## 2020-07-15 DIAGNOSIS — N18.30 HYPERTENSION ASSOCIATED WITH STAGE 3 CHRONIC KIDNEY DISEASE DUE TO TYPE 2 DIABETES MELLITUS: ICD-10-CM

## 2020-07-15 DIAGNOSIS — E11.22 HYPERTENSION ASSOCIATED WITH STAGE 3 CHRONIC KIDNEY DISEASE DUE TO TYPE 2 DIABETES MELLITUS: ICD-10-CM

## 2020-07-15 DIAGNOSIS — I50.42 CHRONIC COMBINED SYSTOLIC AND DIASTOLIC CONGESTIVE HEART FAILURE: ICD-10-CM

## 2020-07-15 DIAGNOSIS — Z79.4 TYPE 2 DIABETES MELLITUS WITH STABLE PROLIFERATIVE RETINOPATHY OF BOTH EYES, WITH LONG-TERM CURRENT USE OF INSULIN: ICD-10-CM

## 2020-07-15 PROCEDURE — 1159F PR MEDICATION LIST DOCUMENTED IN MEDICAL RECORD: ICD-10-PCS | Mod: S$GLB,,, | Performed by: INTERNAL MEDICINE

## 2020-07-15 PROCEDURE — 3008F BODY MASS INDEX DOCD: CPT | Mod: CPTII,S$GLB,, | Performed by: INTERNAL MEDICINE

## 2020-07-15 PROCEDURE — 99999 PR PBB SHADOW E&M-EST. PATIENT-LVL III: ICD-10-PCS | Mod: PBBFAC,,, | Performed by: INTERNAL MEDICINE

## 2020-07-15 PROCEDURE — 3008F PR BODY MASS INDEX (BMI) DOCUMENTED: ICD-10-PCS | Mod: CPTII,S$GLB,, | Performed by: INTERNAL MEDICINE

## 2020-07-15 PROCEDURE — 1126F PR PAIN SEVERITY QUANTIFIED, NO PAIN PRESENT: ICD-10-PCS | Mod: S$GLB,,, | Performed by: INTERNAL MEDICINE

## 2020-07-15 PROCEDURE — 99214 PR OFFICE/OUTPT VISIT, EST, LEVL IV, 30-39 MIN: ICD-10-PCS | Mod: S$GLB,,, | Performed by: INTERNAL MEDICINE

## 2020-07-15 PROCEDURE — 1101F PR PT FALLS ASSESS DOC 0-1 FALLS W/OUT INJ PAST YR: ICD-10-PCS | Mod: CPTII,S$GLB,, | Performed by: INTERNAL MEDICINE

## 2020-07-15 PROCEDURE — 3046F HEMOGLOBIN A1C LEVEL >9.0%: CPT | Mod: CPTII,S$GLB,, | Performed by: INTERNAL MEDICINE

## 2020-07-15 PROCEDURE — 1159F MED LIST DOCD IN RCRD: CPT | Mod: S$GLB,,, | Performed by: INTERNAL MEDICINE

## 2020-07-15 PROCEDURE — 3079F PR MOST RECENT DIASTOLIC BLOOD PRESSURE 80-89 MM HG: ICD-10-PCS | Mod: CPTII,S$GLB,, | Performed by: INTERNAL MEDICINE

## 2020-07-15 PROCEDURE — 1126F AMNT PAIN NOTED NONE PRSNT: CPT | Mod: S$GLB,,, | Performed by: INTERNAL MEDICINE

## 2020-07-15 PROCEDURE — 1101F PT FALLS ASSESS-DOCD LE1/YR: CPT | Mod: CPTII,S$GLB,, | Performed by: INTERNAL MEDICINE

## 2020-07-15 PROCEDURE — 99214 OFFICE O/P EST MOD 30 MIN: CPT | Mod: S$GLB,,, | Performed by: INTERNAL MEDICINE

## 2020-07-15 PROCEDURE — 3075F PR MOST RECENT SYSTOLIC BLOOD PRESS GE 130-139MM HG: ICD-10-PCS | Mod: CPTII,S$GLB,, | Performed by: INTERNAL MEDICINE

## 2020-07-15 PROCEDURE — 99999 PR PBB SHADOW E&M-EST. PATIENT-LVL III: CPT | Mod: PBBFAC,,, | Performed by: INTERNAL MEDICINE

## 2020-07-15 PROCEDURE — 3079F DIAST BP 80-89 MM HG: CPT | Mod: CPTII,S$GLB,, | Performed by: INTERNAL MEDICINE

## 2020-07-15 PROCEDURE — 3075F SYST BP GE 130 - 139MM HG: CPT | Mod: CPTII,S$GLB,, | Performed by: INTERNAL MEDICINE

## 2020-07-15 PROCEDURE — 3046F PR MOST RECENT HEMOGLOBIN A1C LEVEL > 9.0%: ICD-10-PCS | Mod: CPTII,S$GLB,, | Performed by: INTERNAL MEDICINE

## 2020-07-15 RX ORDER — SACUBITRIL AND VALSARTAN 24; 26 MG/1; MG/1
1 TABLET, FILM COATED ORAL 2 TIMES DAILY
Qty: 60 TABLET | Refills: 6 | Status: SHIPPED | OUTPATIENT
Start: 2020-07-15 | End: 2021-03-01 | Stop reason: SDUPTHER

## 2020-07-15 NOTE — PROGRESS NOTES
Subjective:   Patient ID:  Mitch Whittaker is a 64 y.o. male who presents for cardiac consult of Follow-up      Follow-up  Associated symptoms include coughing. Pertinent negatives include no chest pain.     The patient came in today for cardiac consult of Follow-up      Mitch Whittaker is a 64 y.o. male with current medical conditions non obs CAD, DVT on Eliquis, HFrEF 45-50%, CKD, DM, MARION, HTN, HLD, ESRD s/p renal transplant presents for follow up CV eval.     3/15/19  Pt of Dr. Morris, last seen in clinic 2016. Pt presents after recent admission for CHF at [facility] -   Patient is a 63-year-old gentleman who presented to the hospital with shortness of breath. He was found to have pulmonary edema on chest x-ray and bilateral pedal edema. He was admitted for treatment of acute CHF. Echo showed reduced ejection fraction at 25-30%. He was treated with IV Lasix 80 mg twice daily that has been transitioned to Lasix p.o. Patient is not euvolemic and his creatinine is trending up for this reason I am cutting back on Lasix to 40 mg twice daily. Can follow-up with cardiology in 3 days. He sees one [facility]. He was seen by Dr. Omayra Esparza from [facility]. Recommendation is to start him on Entresto once his renal functions stabilize.Pt had edema and SOB while in bed and then went to hospital. Prior to admission he was on lasix PRN. He will see Dr. Pacheco for nephro eval, transplant nephro Dr. Dejesus.  Has been feeling good on lasix 40 BID, has been walking well overall. Does not have CPAP machine.     5/13/19  He was started on Entresto after last visit as renal function improved/stabilized. Has seen Dr. Mason recently started on CPAP again. Toprol dec to 25 mg due to hypotension. Discussed will repeat echo in 1 month to evaluate LV function, will refer for ICD if EF remains low. He woke up with right sided back pain. He has been using CPAP for about a little over a week. Has been doing well lately overall, BP well  controlled, no STEELE/LE lately. PT has mild forgetfulness but family at home has been taking care of him closely.     7/5/19  ECHO last month EF improved to 45-50%. Will not need ICD now. He has LLE pain x 4 days. He has been using Tylenol and ICY HOT for pain relief. Pain started L calf. Is taking lasix 40mg daily. Feels dizzy and lightheaded with standing at times.     7/24/19  Last week - DVT noted on u/s. Started Eliquis 10 BID x 7 days and Eliquis 5 BID after. NO bleeding issues lately with Eliquis, had minor blood after insulin shot which resolved with pressure. He has been walking more lately. Late June he had leg pain and was not walking much since, he has a habit of sitting down for a long period of time. He is on Eliquis 5 BID dosing now.     10/11/19  His recent LE u/s with non occlusive thrombus in PTV in left but no thrombus in POP vein on left. He still feels L leg swelling. No SOB. Has been using CPAP machine regularly. He feels dizzy at times and when he gets up more lightheaded, discussed needs to eval with urologist to stop flomax/proscar.     1/13/20  Last u/s with non occ thrombus L posterior Tib vein. He has been having back pain recently. He has intermittent pain when he gets up and walks around. He has started walking more and started to ride. He had had a bad sinus infection/URI and was given PCN and cough syrup.     7/15/20  Breathing overall stable but has this chronic dry cough. He has been cutting grass and felt like he had a heat stroke/exhaustion. He did not need to go to ER. His legs had some swelling but improved with rest/reclining. He has been painting and moving things around the house. No CP/SOB. He has gained weight recently as well.     Patient feels no chest pain, no sob, no PND, no palpitation,  no syncope, no CNS symptoms.    Patient has dec exercise tolerance.    Patient is compliant with medications.      LE US  Age Indeterminate non occlusive thrombus of the Posterior Tibial  Vein.    CONCLUSIONS   Technically difficult study.     This document has been electronically    SIGNED BY: Fabiana Saleem MD On: 12/18/2019 17:18    RIGHT:  No evidence of Right lower extremity DVT.    LEFT:  Age Indeterminate DVT of the Posterior Tibial Vein.  In comparison to LEV done 7/19 there is no longer thrombus in the POP vein on the left, but there still appears to be non occlusive thrombus seen in the PTV on the left.    CONCLUSIONS   Technically difficult study.     This document has been electronically    SIGNED BY: Darius Azevedo MD On: 09/23/2019 17:20     ECHO : 06/03/2019   CONCLUSIONS     1 - Concentric hypertrophy.     2 - Wall motion abnormalities.     3 - Mildly depressed left ventricular systolic function (EF 45-50%).     4 - Impaired LV relaxation, normal LAP (grade 1 diastolic dysfunction).     5 - Normal right ventricular systolic function .     6 - Trivial pericardial effusion.         Nuclear Stress 06/20/2018   Nuclear Quantitative Functional Analysis:   LVEF: 46 %  LVED Volume: 144 ml  LVES Volume: 91 ml    Impression: NORMAL MYOCARDIAL PERFUSION  1. The perfusion scan is free of evidence for myocardial ischemia or injury.   2. Resting wall motion is physiologic.   3. There is resting LV dysfunction with a reduced ejection fraction of 46 %.   4. The ventricular volumes are normal at rest and stress.   5. The extracardiac distribution of radioactivity is normal.       Past Medical History:   Diagnosis Date    Acquired renal cyst of left kidney     Anemia associated with chronic renal failure     CAD (coronary artery disease)     nonobstructive [facility] 9/14    CHF (congestive heart failure)     Chronic immunosuppression with Prograf and MMF 6/18/2015    CKD (chronic kidney disease) stage 3, GFR 30-59 ml/min     Diabetic retinopathy     DM (diabetes mellitus), type 2 with complications 1994    Edema     End stage kidney disease     s/p transplant, doing well    Gallbladder polyp      "Heart failure, diastolic, due to HTN     Hemodialysis status     off since transplant    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     History of colon polyps     HPTH (hyperparathyroidism)     Hyperlipidemia     Hypertension associated with stage 3 chronic kidney disease due to type 2 diabetes mellitus     Obesity     PCO (posterior capsular opacification), left 3/4/2019    Proteinuria     resolved s/p transplant    S/P kidney transplant     Sleep apnea     Type 2 diabetes, uncontrolled, with retinopathy     Type II diabetes mellitus with renal manifestations        Past Surgical History:   Procedure Laterality Date    CARDIAC CATHETERIZATION      normal coronary    COLONOSCOPY N/A 2018    Procedure: COLONOSCOPY;  Surgeon: Chava Ronquillo MD;  Location: [facility] ENDO;  Service: Endoscopy;  Laterality: N/A;    KIDNEY TRANSPLANT      RETINAL LASER PROCEDURE         Social History     Tobacco Use    Smoking status: Former Smoker     Quit date: 2013     Years since quittin.1    Smokeless tobacco: Former User     Quit date: 2013    Tobacco comment: used marijuana since [id number], stopped after started dialysis   Substance Use Topics    Alcohol use: No     Alcohol/week: 0.0 standard drinks    Drug use: No     Comment:         Family History   Problem Relation Age of Onset    Diabetes Mother     Hypertension Mother     Heart failure Mother     Kidney disease Sister         ESRD    Diabetes Sister     Diabetes Maternal Grandmother     Cancer Neg Hx        Patient's Medications   New Prescriptions    No medications on file   Previous Medications    APIXABAN (ELIQUIS) 5 MG TAB    Take 1 tablet (5 mg total) by mouth 2 (two) times daily.    ASPIRIN (ECOTRIN) 81 MG EC TABLET    Take 1 tablet by mouth Daily.     BD ULTRA-FINE MINI PEN NEEDLE 31 GAUGE X 3/16" NDLE    Use to inject insulin as needed up to 4 times daily    BISACODYL (DULCOLAX) 5 MG EC " "TABLET    Take 5 mg by mouth daily as needed for Constipation.    BLOOD SUGAR DIAGNOSTIC STRP    Use to test four times per day    ERGOCALCIFEROL (VITAMIN D2) 50,000 UNIT CAP    Take 1 capsule by mouth every 7 days    FAMOTIDINE (PEPCID) 20 MG TABLET    TAKE ONE TABLET BY MOUTH EVERY EVENING    FINASTERIDE (PROSCAR) 5 MG TABLET    Take 1 tablet (5 mg total) by mouth once daily.    FISH OIL-OMEGA-3 FATTY ACIDS 300-1,000 MG CAPSULE    Take 1 g by mouth.    FISH,BORA,FLAX OILS-OM3,6,9NO1 1,200 MG CAP    Take by mouth.    FLUTICASONE PROPIONATE (FLONASE) 50 MCG/ACTUATION NASAL SPRAY    2 sprays (100 mcg total) by Each Nostril route once daily.    FUROSEMIDE (LASIX) 80 MG TABLET    Take one-half tablet (40 mg) by mouth 2 (two) times daily.    GLIMEPIRIDE (AMARYL) 4 MG TABLET    Take 1 tablet (4 mg total) by mouth before breakfast.    INSULIN (LANTUS SOLOSTAR U-100 INSULIN) GLARGINE 100 UNITS/ML (3ML) SUBQ PEN    Inject 35 Units into the skin 2 (two) times daily.    INSULIN ASPART U-100 (NOVOLOG FLEXPEN U-100 INSULIN) 100 UNIT/ML (3 ML) INPN PEN    Inject 25 Units into the skin 3 (three) times daily with meals.    INSULIN GLARGINE 100 UNITS/ML (3ML) SUBQ PEN    Inject 30 Units into the skin 2 (two) times daily.    KETOCONAZOLE (NIZORAL) 200 MG TAB    Take 0.5 tablets (100 mg total) by mouth once daily.    LANCETS 33 GAUGE MISC    1 Stick by Misc.(Non-Drug; Combo Route) route as directed. Contour lancets. Use to check BG 2-3 times daily.    METOPROLOL SUCCINATE (TOPROL-XL) 25 MG 24 HR TABLET    Take 1 tablet (25 mg total) by mouth once daily.    MULTIVITAMIN (THERA) TABLET    Take 1 tablet by mouth.    MYCOPHENOLATE (CELLCEPT) 250 MG CAP    Take 3 capsules (750 mg total) by mouth 2 (two) times daily.    OMEGA-3 FATTY ACIDS-VITAMIN E (FISH OIL) 1,000 MG CAP    Take 1 capsule by mouth once daily.     PEN NEEDLE, DIABETIC (BD INSULIN PEN NEEDLE UF SHORT) 31 GAUGE X 5/16" NDLE    USE TO INJECT INSULIN TWICE A DAY    " "POLYETHYLENE GLYCOL (GLYCOLAX) 17 GRAM PWPK    Take 17 grams by mouth daily for 7 days.    PREDNISONE (DELTASONE) 5 MG TABLET    Take 1 tablet (5 mg total) by mouth once daily.    PROMETHAZINE-DEXTROMETHORPHAN (PROMETHAZINE-DM) 6.25-15 MG/5 ML SYRP    Take 5 mLs by mouth every 6 to 8 hours as needed.    ROSUVASTATIN (CRESTOR) 40 MG TAB    TAKE ONE TABLET BY MOUTH EVERY EVENING    SACUBITRIL-VALSARTAN (ENTRESTO) 24-26 MG PER TABLET    Take 1 tablet by mouth 2 (two) times daily.    TACROLIMUS (PROGRAF) 1 MG CAP    Take 4 capsules (4 mg total) by mouth every 12 (twelve) hours.    TAMSULOSIN (FLOMAX) 0.4 MG CAP    Take 1 capsule (0.4 mg total) by mouth once daily.   Modified Medications    No medications on file   Discontinued Medications    No medications on file       Review of Systems   Constitutional: Positive for malaise/fatigue.   HENT: Negative.    Eyes: Negative.    Respiratory: Positive for cough.    Cardiovascular: Positive for leg swelling. Negative for chest pain and palpitations.   Gastrointestinal: Negative.    Genitourinary: Negative.    Musculoskeletal: Positive for back pain.   Skin: Negative.    Neurological: Positive for dizziness.   Endo/Heme/Allergies: Negative.    Psychiatric/Behavioral: Negative.    All 12 systems otherwise negative.      Wt Readings from Last 3 Encounters:   07/15/20 131.4 kg (289 lb 11 oz)   01/13/20 127.5 kg (281 lb 1.4 oz)   01/02/20 127.5 kg (281 lb 1.4 oz)     Temp Readings from Last 3 Encounters:   01/02/20 99.3 °F (37.4 °C) (Tympanic)   11/18/19 97.9 °F (36.6 °C) (Tympanic)   09/26/19 97.5 °F (36.4 °C) (Tympanic)     BP Readings from Last 3 Encounters:   07/15/20 138/80   01/13/20 112/70   01/02/20 130/66     Pulse Readings from Last 3 Encounters:   07/15/20 86   01/13/20 (!) 118   01/02/20 99       /80 (BP Location: Right arm, Patient Position: Sitting, BP Method: Large (Manual))   Pulse 86   Ht 5' 8" (1.727 m)   Wt 131.4 kg (289 lb 11 oz)   SpO2 98%   BMI " 44.05 kg/m²     Objective:   Physical Exam   Constitutional: He is oriented to person, place, and time. He appears well-developed and well-nourished. No distress.   HENT:   Head: Normocephalic and atraumatic.   Nose: Nose normal.   Mouth/Throat: Oropharynx is clear and moist.   Eyes: Conjunctivae and EOM are normal. No scleral icterus.   Neck: Normal range of motion. Neck supple. No JVD present. No thyromegaly present.   Cardiovascular: Normal rate, regular rhythm, S1 normal and S2 normal. Exam reveals no gallop, no S3, no S4 and no friction rub.   No murmur heard.  Pulmonary/Chest: Effort normal and breath sounds normal. No stridor. No respiratory distress. He has no wheezes. He has no rales. He exhibits no tenderness.   Abdominal: Soft. Bowel sounds are normal. He exhibits no distension and no mass. There is no abdominal tenderness. There is no rebound.   Genitourinary:    Genitourinary Comments: Deferred     Musculoskeletal: Normal range of motion.         General: Edema present. No tenderness or deformity.   Lymphadenopathy:     He has no cervical adenopathy.   Neurological: He is alert and oriented to person, place, and time. He exhibits normal muscle tone. Coordination normal.   Skin: Skin is warm and dry. No rash noted. He is not diaphoretic. No erythema. No pallor.   Psychiatric: He has a normal mood and affect. His behavior is normal. Judgment and thought content normal.   Nursing note and vitals reviewed.      Lab Results   Component Value Date     07/01/2020    K 3.3 (L) 07/01/2020     07/01/2020    CO2 26 07/01/2020    BUN 17 07/01/2020    CREATININE 1.7 (H) 07/01/2020     (H) 07/01/2020    HGBA1C 9.3 (H) 07/01/2020    MG 1.7 06/03/2019    AST 16 07/01/2020    ALT 18 07/01/2020    ALBUMIN 3.5 07/01/2020    PROT 6.7 07/01/2020    BILITOT 0.6 07/01/2020    WBC 7.79 07/01/2020    HGB 12.6 (L) 07/01/2020    HCT 42.0 07/01/2020    HCT 36 06/12/2015    MCV 86 07/01/2020     07/01/2020     INR 1.0 06/18/2015    TSH 0.761 07/01/2020    CHOL 157 07/01/2020    HDL 55 07/01/2020    LDLCALC 81.6 07/01/2020    TRIG 102 07/01/2020     (H) 12/04/2018     Assessment:      1. Coronary artery disease involving native coronary artery of native heart without angina pectoris    2. Hypertension associated with stage 3 chronic kidney disease due to type 2 diabetes mellitus    3. Chronic combined systolic and diastolic congestive heart failure    4. 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, with long-term current use of insulin    5. CKD (chronic kidney disease) stage 3, GFR 30-59 ml/min    6. Living-related donor kidney transplant (daughter) - 6/12/15    7. Acute deep vein thrombosis (DVT) of other specified vein of left lower extremity    8. Type 2 diabetes mellitus with diabetic nephropathy, unspecified whether long term insulin use    9. Obstructive sleep apnea syndrome        Plan:   1. Chronic CHF EF 45-50%  - cont lasix, and extra PRN, pt euvolemic   - cont Toprol and Entresto  - cont low salt diet, fluid restriction    2. HTN with mild edema  - cont meds for afterload reduction   - low salt diet  - rec compression stockings    3. HLD  - cont statin    4. ESRD s/p Transplant with CKD  - f/u with nephro    5. CAD, non obs  - cont meds  - stress 6/2018 negative    6. MARION  -cont CPAP    7. Obesity  -rec weight loss with diet/exercise     8. DVT  - cont Eliquis 5 BID  - non occlusive thrombus in PTV in left but no thrombus in POP vein on left.    9. Dizziness  - likeky sec to Flomax and Proscar, can discuss with urologist if can stop    10. DM2 - A1c -9.3  - cont meds per PCP    Thank you for allowing me to participate in this patient's care. Please do not hesitate to contact me with any questions or concerns. Consult note has been forwarded to the referral physician.

## 2020-08-02 ENCOUNTER — PATIENT OUTREACH (OUTPATIENT)
Dept: ADMINISTRATIVE | Facility: OTHER | Age: 65
End: 2020-08-02

## 2020-08-03 NOTE — PROGRESS NOTES
Requested updates within Care Everywhere.  Patient's chart was reviewed for overdue [facility] topics.  Immunizations reconciled.

## 2020-08-17 ENCOUNTER — PATIENT OUTREACH (OUTPATIENT)
Dept: ADMINISTRATIVE | Facility: OTHER | Age: 65
End: 2020-08-17

## 2020-08-17 NOTE — PROGRESS NOTES
Health Maintenance Due   Topic Date Due    Shingles Vaccine (1 of 2) 11/20/2003    Pneumococcal Vaccine (65+ High/Highest Risk) (2 of 2 - PPSV23) 11/21/2019    TETANUS VACCINE  12/04/2019    Foot Exam  09/26/2020     Updates were requested from care everywhere.  Chart was reviewed for overdue Proactive [facility] Encounters ([facility]) topics (CRS, Breast Cancer Screening, Eye exam)  Health Maintenance has been updated.  LINKS immunization registry triggered.  Immunizations were reconciled.

## 2020-08-20 ENCOUNTER — LAB VISIT (OUTPATIENT)
Dept: LAB | Facility: HOSPITAL | Age: 65
End: 2020-08-20
Attending: INTERNAL MEDICINE
Payer: COMMERCIAL

## 2020-08-20 LAB
ESTIMATED AVG GLUCOSE: 200 MG/DL (ref 68–131)
HBA1C MFR BLD HPLC: 8.6 % (ref 4–5.6)

## 2020-08-20 PROCEDURE — 36415 COLL VENOUS BLD VENIPUNCTURE: CPT | Mod: PO

## 2020-08-20 PROCEDURE — 83036 HEMOGLOBIN GLYCOSYLATED A1C: CPT

## 2020-08-28 ENCOUNTER — OFFICE VISIT (OUTPATIENT)
Dept: INTERNAL MEDICINE | Facility: CLINIC | Age: 65
End: 2020-08-28
Payer: COMMERCIAL

## 2020-08-28 VITALS
DIASTOLIC BLOOD PRESSURE: 76 MMHG | RESPIRATION RATE: 20 BRPM | HEIGHT: 69 IN | TEMPERATURE: 98 F | WEIGHT: 295.19 LBS | BODY MASS INDEX: 43.72 KG/M2 | SYSTOLIC BLOOD PRESSURE: 136 MMHG | HEART RATE: 92 BPM

## 2020-08-28 DIAGNOSIS — Z79.4 TYPE 2 DIABETES MELLITUS WITH STABLE PROLIFERATIVE RETINOPATHY OF BOTH EYES, WITH LONG-TERM CURRENT USE OF INSULIN: ICD-10-CM

## 2020-08-28 DIAGNOSIS — N28.1 ACQUIRED RENAL CYST OF LEFT KIDNEY: ICD-10-CM

## 2020-08-28 DIAGNOSIS — R76.8 HEPATITIS C ANTIBODY POSITIVE IN BLOOD: ICD-10-CM

## 2020-08-28 DIAGNOSIS — E11.8 DM (DIABETES MELLITUS), TYPE 2 WITH COMPLICATIONS: ICD-10-CM

## 2020-08-28 DIAGNOSIS — N20.0 NEPHROLITHIASIS: ICD-10-CM

## 2020-08-28 DIAGNOSIS — I50.42 CHRONIC COMBINED SYSTOLIC AND DIASTOLIC CONGESTIVE HEART FAILURE: ICD-10-CM

## 2020-08-28 DIAGNOSIS — Z86.010 HISTORY OF COLON POLYPS: ICD-10-CM

## 2020-08-28 DIAGNOSIS — N25.81 SECONDARY HYPERPARATHYROIDISM OF RENAL ORIGIN: ICD-10-CM

## 2020-08-28 DIAGNOSIS — E66.01 CLASS 3 SEVERE OBESITY DUE TO EXCESS CALORIES WITH SERIOUS COMORBIDITY AND BODY MASS INDEX (BMI) OF 40.0 TO 44.9 IN ADULT: ICD-10-CM

## 2020-08-28 DIAGNOSIS — G47.33 OBSTRUCTIVE SLEEP APNEA SYNDROME: ICD-10-CM

## 2020-08-28 DIAGNOSIS — I12.9 HYPERTENSION ASSOCIATED WITH STAGE 3 CHRONIC KIDNEY DISEASE DUE TO TYPE 2 DIABETES MELLITUS: ICD-10-CM

## 2020-08-28 DIAGNOSIS — N40.0 BENIGN PROSTATIC HYPERPLASIA WITHOUT LOWER URINARY TRACT SYMPTOMS: ICD-10-CM

## 2020-08-28 DIAGNOSIS — N18.9 ANEMIA ASSOCIATED WITH CHRONIC RENAL FAILURE: ICD-10-CM

## 2020-08-28 DIAGNOSIS — E11.3553 TYPE 2 DIABETES MELLITUS WITH STABLE PROLIFERATIVE RETINOPATHY OF BOTH EYES, WITH LONG-TERM CURRENT USE OF INSULIN: ICD-10-CM

## 2020-08-28 DIAGNOSIS — E11.42 DIABETIC PERIPHERAL NEUROPATHY: ICD-10-CM

## 2020-08-28 DIAGNOSIS — E11.21 TYPE 2 DIABETES MELLITUS WITH DIABETIC NEPHROPATHY, UNSPECIFIED WHETHER LONG TERM INSULIN USE: ICD-10-CM

## 2020-08-28 DIAGNOSIS — I25.10 CORONARY ARTERY DISEASE INVOLVING NATIVE CORONARY ARTERY OF NATIVE HEART WITHOUT ANGINA PECTORIS: ICD-10-CM

## 2020-08-28 DIAGNOSIS — Z94.0 KIDNEY TRANSPLANT STATUS, LIVING RELATED DONOR: Chronic | ICD-10-CM

## 2020-08-28 DIAGNOSIS — I82.492 ACUTE DEEP VEIN THROMBOSIS (DVT) OF OTHER SPECIFIED VEIN OF LEFT LOWER EXTREMITY: ICD-10-CM

## 2020-08-28 DIAGNOSIS — N18.30 HYPERTENSION ASSOCIATED WITH STAGE 3 CHRONIC KIDNEY DISEASE DUE TO TYPE 2 DIABETES MELLITUS: ICD-10-CM

## 2020-08-28 DIAGNOSIS — Z29.89 PROPHYLACTIC IMMUNOTHERAPY: Chronic | ICD-10-CM

## 2020-08-28 DIAGNOSIS — N18.30 CKD (CHRONIC KIDNEY DISEASE) STAGE 3, GFR 30-59 ML/MIN: ICD-10-CM

## 2020-08-28 DIAGNOSIS — D63.1 ANEMIA ASSOCIATED WITH CHRONIC RENAL FAILURE: ICD-10-CM

## 2020-08-28 DIAGNOSIS — E11.22 HYPERTENSION ASSOCIATED WITH STAGE 3 CHRONIC KIDNEY DISEASE DUE TO TYPE 2 DIABETES MELLITUS: ICD-10-CM

## 2020-08-28 DIAGNOSIS — Z00.00 ROUTINE GENERAL MEDICAL EXAMINATION AT A HEALTH CARE FACILITY: Primary | ICD-10-CM

## 2020-08-28 PROCEDURE — 99397 PR PREVENTIVE VISIT,EST,65 & OVER: ICD-10-PCS | Mod: S$GLB,,, | Performed by: INTERNAL MEDICINE

## 2020-08-28 PROCEDURE — 3052F HG A1C>EQUAL 8.0%<EQUAL 9.0%: CPT | Mod: CPTII,S$GLB,, | Performed by: INTERNAL MEDICINE

## 2020-08-28 PROCEDURE — 99999 PR PBB SHADOW E&M-EST. PATIENT-LVL III: ICD-10-PCS | Mod: PBBFAC,,, | Performed by: INTERNAL MEDICINE

## 2020-08-28 PROCEDURE — 3052F PR MOST RECENT HEMOGLOBIN A1C LEVEL 8.0 - < 9.0%: ICD-10-PCS | Mod: CPTII,S$GLB,, | Performed by: INTERNAL MEDICINE

## 2020-08-28 PROCEDURE — 3078F DIAST BP <80 MM HG: CPT | Mod: CPTII,S$GLB,, | Performed by: INTERNAL MEDICINE

## 2020-08-28 PROCEDURE — 3078F PR MOST RECENT DIASTOLIC BLOOD PRESSURE < 80 MM HG: ICD-10-PCS | Mod: CPTII,S$GLB,, | Performed by: INTERNAL MEDICINE

## 2020-08-28 PROCEDURE — 3075F PR MOST RECENT SYSTOLIC BLOOD PRESS GE 130-139MM HG: ICD-10-PCS | Mod: CPTII,S$GLB,, | Performed by: INTERNAL MEDICINE

## 2020-08-28 PROCEDURE — 3075F SYST BP GE 130 - 139MM HG: CPT | Mod: CPTII,S$GLB,, | Performed by: INTERNAL MEDICINE

## 2020-08-28 PROCEDURE — 99999 PR PBB SHADOW E&M-EST. PATIENT-LVL III: CPT | Mod: PBBFAC,,, | Performed by: INTERNAL MEDICINE

## 2020-08-28 PROCEDURE — 99397 PER PM REEVAL EST PAT 65+ YR: CPT | Mod: S$GLB,,, | Performed by: INTERNAL MEDICINE

## 2020-08-28 RX ORDER — IPRATROPIUM BROMIDE 21 UG/1
2 SPRAY, METERED NASAL 2 TIMES DAILY
Qty: 30 ML | Refills: 11 | Status: SHIPPED | OUTPATIENT
Start: 2020-08-28 | End: 2021-12-06

## 2020-08-28 NOTE — PROGRESS NOTES
HPI:  Patient is a 64-year-old man comes today for follow-up of his diabetes, hypertension, lipids, chronic kidney disease, coronary disease, status post kidney transplant, and for his annual physical exam.  He denies any chest pains or shortness of breath.  He denies any problems with hypoglycemia.  He also denies any change in his chronic dyspnea on exertion.  He denies any other new problems or complaints.      Current MEDS: medcard review, verified and update  Allergies: Per the electronic medical record    Past Medical History:   Diagnosis Date    Acquired renal cyst of left kidney     Anemia associated with chronic renal failure     CAD (coronary artery disease)     nonobstructive [facility] 9/14    CHF (congestive heart failure)     Chronic immunosuppression with Prograf and MMF 6/18/2015    CKD (chronic kidney disease) stage 3, GFR 30-59 ml/min     Diabetic retinopathy     DM (diabetes mellitus), type 2 with complications 1994    Edema     End stage kidney disease     s/p transplant, doing well    Gallbladder polyp     Heart failure, diastolic, due to HTN     Hemodialysis status     off since transplant    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     History of colon polyps     HPTH (hyperparathyroidism)     Hyperlipidemia     Hypertension associated with stage 3 chronic kidney disease due to type 2 diabetes mellitus     Obesity     PCO (posterior capsular opacification), left 3/4/2019    Proteinuria     resolved s/p transplant    S/P kidney transplant     Sleep apnea     Type 2 diabetes, uncontrolled, with retinopathy     Type II diabetes mellitus with renal manifestations        Past Surgical History:   Procedure Laterality Date    CARDIAC CATHETERIZATION  2008    normal coronary    COLONOSCOPY N/A 4/5/2018    Procedure: COLONOSCOPY;  Surgeon: Chava Ronquillo MD;  Location: [facility];  Service: Endoscopy;  Laterality: N/A;    KIDNEY TRANSPLANT       "RETINAL LASER PROCEDURE         SHx: per the electronic medical record    FHx: recorded in the electronic medical record    ROS:    denies any chest pains or shortness of breath. Denies any nausea, vomiting or diarrhea. Denies any fever, chills or sweats. Denies any change in weight, voice, stool, skin or hair. Denies any dysuria, dyspepsia or dysphagia. Denies any change in vision, hearing or headaches. Denies any swollen lymph nodes or loss of memory.    PE:  /76   Pulse 92   Temp 98.4 °F (36.9 °C) (Tympanic)   Resp 20   Ht 5' 9" (1.753 m)   Wt 133.9 kg (295 lb 3.1 oz)   BMI 43.59 kg/m²   Gen: Well-developed, well-nourished, male, in no acute distress, oriented x3  HEENT: neck is supple, no adenopathy, carotids 2+ equal without bruits, thyroid exam normal size without nodules.  CHEST: clear to auscultation and percussion  CVS: regular rate and rhythm without significant murmur, gallop, or rubs  ABD: soft, benign, no rebound no guarding, no distention.  Bowel sounds are normal.     nontender.  No palpable masses.  No organomegaly and no audible bruits.  RECTAL:  Deferred.  EXT: no clubbing, cyanosis, or edema  LYMPH: no cervical, inguinal, or axillary adenopathy  FEET: no loss of sensation.  No ulcers or pressure sores.  Monofilament testing unremarkable  NEURO: gait normal.  Cranial nerves II- XII intact. No nystagmus.  Speech normal.   Gross motor and sensory unremarkable.    Lab Results   Component Value Date    WBC 7.79 07/01/2020    HGB 12.6 (L) 07/01/2020    HCT 42.0 07/01/2020     07/01/2020    CHOL 157 07/01/2020    TRIG 102 07/01/2020    HDL 55 07/01/2020    ALT 18 07/01/2020    AST 16 07/01/2020     07/01/2020    K 3.3 (L) 07/01/2020     07/01/2020    CREATININE 1.7 (H) 07/01/2020    BUN 17 07/01/2020    CO2 26 07/01/2020    TSH 0.761 07/01/2020    PSA 0.31 04/04/2019    INR 1.0 06/18/2015    HGBA1C 8.6 (H) 08/20/2020       Impression:  Diabetes, not to goal  Hypertension, " lipids, chronic kidney disease all stable  Chronic congestive heart failure, stable  Patient Active Problem List   Diagnosis    Anemia associated with chronic renal failure    Obesity    Acquired renal cyst of left kidney    Nephrolithiasis    Sleep apnea    Pseudophakia    CAD (coronary artery disease)    Living-related donor kidney transplant (daughter) - 6/12/15    Diabetic peripheral neuropathy    Chronic immunosuppression with Prograf, MMF and prednisone    Secondary hyperparathyroidism of renal origin    CKD (chronic kidney disease) stage 3, GFR 30-59 ml/min    Type II diabetes mellitus with renal manifestations    Hypertension associated with stage 3 chronic kidney disease due to type 2 diabetes mellitus    DM (diabetes mellitus), type 2 with complications    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, with long-term current use of insulin    Epiretinal membrane (ERM) of both eyes    History of colon polyps    Benign prostatic hyperplasia without lower urinary tract symptoms    PCO (posterior capsular opacification), left    Chronic combined systolic and diastolic congestive heart failure    Deep vein thrombosis (DVT) of lower extremity       Plan:   Orders Placed This Encounter    Hemoglobin A1C    Lipid Panel    Basic metabolic panel    TSH    CBC auto differential    ipratropium (ATROVENT) 0.03 % nasal spray     Patient will see me again in 3 months with above lab work.  He has been encouraged to make follow-up appointments with his nephrologist and transplant team.  This note is generated with speech recognition software and is subject to transcription error and sound alike phrases that may be missed by proofreading.

## 2020-09-11 DIAGNOSIS — Z94.0 KIDNEY REPLACED BY TRANSPLANT: ICD-10-CM

## 2020-09-11 RX ORDER — TACROLIMUS 1 MG/1
4 CAPSULE ORAL EVERY 12 HOURS
Qty: 240 CAPSULE | Refills: 11 | Status: SHIPPED | OUTPATIENT
Start: 2020-09-11 | End: 2021-06-11 | Stop reason: SDUPTHER

## 2020-09-11 RX ORDER — MYCOPHENOLATE MOFETIL 250 MG/1
750 CAPSULE ORAL 2 TIMES DAILY
Qty: 180 CAPSULE | Refills: 11 | Status: SHIPPED | OUTPATIENT
Start: 2020-09-11 | End: 2021-06-11 | Stop reason: SDUPTHER

## 2020-09-17 DIAGNOSIS — Z94.0 KIDNEY REPLACED BY TRANSPLANT: ICD-10-CM

## 2020-09-17 RX ORDER — PREDNISONE 5 MG/1
5 TABLET ORAL DAILY
Qty: 30 TABLET | Refills: 11 | Status: SHIPPED | OUTPATIENT
Start: 2020-09-17 | End: 2021-06-11 | Stop reason: SDUPTHER

## 2020-10-02 ENCOUNTER — IMMUNIZATION (OUTPATIENT)
Dept: INTERNAL MEDICINE | Facility: CLINIC | Age: 65
End: 2020-10-02
Payer: COMMERCIAL

## 2020-10-02 PROCEDURE — 90471 IMMUNIZATION ADMIN: CPT | Mod: S$GLB,,, | Performed by: INTERNAL MEDICINE

## 2020-10-02 PROCEDURE — 90471 FLU VACCINE - QUADRIVALENT - ADJUVANTED: ICD-10-PCS | Mod: S$GLB,,, | Performed by: INTERNAL MEDICINE

## 2020-10-02 PROCEDURE — 90694 VACC AIIV4 NO PRSRV 0.5ML IM: CPT | Mod: S$GLB,,, | Performed by: INTERNAL MEDICINE

## 2020-10-02 PROCEDURE — 90694 FLU VACCINE - QUADRIVALENT - ADJUVANTED: ICD-10-PCS | Mod: S$GLB,,, | Performed by: INTERNAL MEDICINE

## 2020-10-12 RX ORDER — FUROSEMIDE 80 MG/1
TABLET ORAL
Qty: 30 TABLET | Refills: 11 | Status: SHIPPED | OUTPATIENT
Start: 2020-10-12 | End: 2021-08-31 | Stop reason: SDUPTHER

## 2020-10-12 RX ORDER — ROSUVASTATIN CALCIUM 40 MG/1
40 TABLET, COATED ORAL DAILY
Qty: 90 TABLET | Refills: 1 | Status: SHIPPED | OUTPATIENT
Start: 2020-10-12 | End: 2021-04-05 | Stop reason: SDUPTHER

## 2020-11-23 ENCOUNTER — LAB VISIT (OUTPATIENT)
Dept: LAB | Facility: HOSPITAL | Age: 65
End: 2020-11-23
Attending: INTERNAL MEDICINE
Payer: COMMERCIAL

## 2020-11-23 DIAGNOSIS — E11.8 DM (DIABETES MELLITUS), TYPE 2 WITH COMPLICATIONS: ICD-10-CM

## 2020-11-23 LAB
BASOPHILS # BLD AUTO: 0.02 K/UL (ref 0–0.2)
BASOPHILS NFR BLD: 0.3 % (ref 0–1.9)
DIFFERENTIAL METHOD: ABNORMAL
EOSINOPHIL # BLD AUTO: 0 K/UL (ref 0–0.5)
EOSINOPHIL NFR BLD: 0.4 % (ref 0–8)
ERYTHROCYTE [DISTWIDTH] IN BLOOD BY AUTOMATED COUNT: 13.2 % (ref 11.5–14.5)
HCT VFR BLD AUTO: 43.3 % (ref 40–54)
HGB BLD-MCNC: 12.6 G/DL (ref 14–18)
IMM GRANULOCYTES # BLD AUTO: 0.06 K/UL (ref 0–0.04)
IMM GRANULOCYTES NFR BLD AUTO: 0.8 % (ref 0–0.5)
LYMPHOCYTES # BLD AUTO: 1.1 K/UL (ref 1–4.8)
LYMPHOCYTES NFR BLD: 14 % (ref 18–48)
MCH RBC QN AUTO: 25.7 PG (ref 27–31)
MCHC RBC AUTO-ENTMCNC: 29.1 G/DL (ref 32–36)
MCV RBC AUTO: 88 FL (ref 82–98)
MONOCYTES # BLD AUTO: 0.7 K/UL (ref 0.3–1)
MONOCYTES NFR BLD: 9.3 % (ref 4–15)
NEUTROPHILS # BLD AUTO: 5.9 K/UL (ref 1.8–7.7)
NEUTROPHILS NFR BLD: 75.2 % (ref 38–73)
NRBC BLD-RTO: 0 /100 WBC
PLATELET # BLD AUTO: 161 K/UL (ref 150–350)
PMV BLD AUTO: 11.8 FL (ref 9.2–12.9)
RBC # BLD AUTO: 4.91 M/UL (ref 4.6–6.2)
WBC # BLD AUTO: 7.86 K/UL (ref 3.9–12.7)

## 2020-11-23 PROCEDURE — 85025 COMPLETE CBC W/AUTO DIFF WBC: CPT

## 2020-11-23 PROCEDURE — 80048 BASIC METABOLIC PNL TOTAL CA: CPT

## 2020-11-23 PROCEDURE — 83036 HEMOGLOBIN GLYCOSYLATED A1C: CPT

## 2020-11-23 PROCEDURE — 80061 LIPID PANEL: CPT

## 2020-11-23 PROCEDURE — 36415 COLL VENOUS BLD VENIPUNCTURE: CPT | Mod: PO

## 2020-11-23 PROCEDURE — 84443 ASSAY THYROID STIM HORMONE: CPT

## 2020-11-24 LAB
ANION GAP SERPL CALC-SCNC: 10 MMOL/L (ref 8–16)
BUN SERPL-MCNC: 19 MG/DL (ref 8–23)
CALCIUM SERPL-MCNC: 8.7 MG/DL (ref 8.7–10.5)
CHLORIDE SERPL-SCNC: 105 MMOL/L (ref 95–110)
CHOLEST SERPL-MCNC: 163 MG/DL (ref 120–199)
CHOLEST/HDLC SERPL: 3 {RATIO} (ref 2–5)
CO2 SERPL-SCNC: 32 MMOL/L (ref 23–29)
CREAT SERPL-MCNC: 1.7 MG/DL (ref 0.5–1.4)
EST. GFR  (AFRICAN AMERICAN): 47.2 ML/MIN/1.73 M^2
EST. GFR  (NON AFRICAN AMERICAN): 40.8 ML/MIN/1.73 M^2
ESTIMATED AVG GLUCOSE: 183 MG/DL (ref 68–131)
GLUCOSE SERPL-MCNC: 107 MG/DL (ref 70–110)
HBA1C MFR BLD HPLC: 8 % (ref 4–5.6)
HDLC SERPL-MCNC: 54 MG/DL (ref 40–75)
HDLC SERPL: 33.1 % (ref 20–50)
LDLC SERPL CALC-MCNC: 90 MG/DL (ref 63–159)
NONHDLC SERPL-MCNC: 109 MG/DL
POTASSIUM SERPL-SCNC: 3.5 MMOL/L (ref 3.5–5.1)
SODIUM SERPL-SCNC: 147 MMOL/L (ref 136–145)
TRIGL SERPL-MCNC: 95 MG/DL (ref 30–150)
TSH SERPL DL<=0.005 MIU/L-ACNC: 0.85 UIU/ML (ref 0.4–4)

## 2020-11-30 ENCOUNTER — OFFICE VISIT (OUTPATIENT)
Dept: INTERNAL MEDICINE | Facility: CLINIC | Age: 65
End: 2020-11-30
Payer: COMMERCIAL

## 2020-11-30 VITALS
WEIGHT: 302.69 LBS | HEIGHT: 67 IN | BODY MASS INDEX: 47.51 KG/M2 | SYSTOLIC BLOOD PRESSURE: 146 MMHG | HEART RATE: 101 BPM | OXYGEN SATURATION: 96 % | TEMPERATURE: 98 F | DIASTOLIC BLOOD PRESSURE: 78 MMHG

## 2020-11-30 DIAGNOSIS — N18.30 HYPERTENSION ASSOCIATED WITH STAGE 3 CHRONIC KIDNEY DISEASE DUE TO TYPE 2 DIABETES MELLITUS: ICD-10-CM

## 2020-11-30 DIAGNOSIS — Z29.89 PROPHYLACTIC IMMUNOTHERAPY: Primary | Chronic | ICD-10-CM

## 2020-11-30 DIAGNOSIS — N25.81 SECONDARY HYPERPARATHYROIDISM OF RENAL ORIGIN: ICD-10-CM

## 2020-11-30 DIAGNOSIS — Z94.0 KIDNEY TRANSPLANT STATUS, LIVING RELATED DONOR: Chronic | ICD-10-CM

## 2020-11-30 DIAGNOSIS — I82.492 ACUTE DEEP VEIN THROMBOSIS (DVT) OF OTHER SPECIFIED VEIN OF LEFT LOWER EXTREMITY: ICD-10-CM

## 2020-11-30 DIAGNOSIS — G47.33 OBSTRUCTIVE SLEEP APNEA SYNDROME: ICD-10-CM

## 2020-11-30 DIAGNOSIS — E11.21 TYPE 2 DIABETES MELLITUS WITH DIABETIC NEPHROPATHY, UNSPECIFIED WHETHER LONG TERM INSULIN USE: ICD-10-CM

## 2020-11-30 DIAGNOSIS — D63.1 ANEMIA ASSOCIATED WITH CHRONIC RENAL FAILURE: ICD-10-CM

## 2020-11-30 DIAGNOSIS — E11.8 DM (DIABETES MELLITUS), TYPE 2 WITH COMPLICATIONS: ICD-10-CM

## 2020-11-30 DIAGNOSIS — N18.31 STAGE 3A CHRONIC KIDNEY DISEASE: ICD-10-CM

## 2020-11-30 DIAGNOSIS — N18.9 ANEMIA ASSOCIATED WITH CHRONIC RENAL FAILURE: ICD-10-CM

## 2020-11-30 DIAGNOSIS — E11.3553 TYPE 2 DIABETES MELLITUS WITH STABLE PROLIFERATIVE RETINOPATHY OF BOTH EYES, WITH LONG-TERM CURRENT USE OF INSULIN: ICD-10-CM

## 2020-11-30 DIAGNOSIS — Z79.4 TYPE 2 DIABETES MELLITUS WITH STABLE PROLIFERATIVE RETINOPATHY OF BOTH EYES, WITH LONG-TERM CURRENT USE OF INSULIN: ICD-10-CM

## 2020-11-30 DIAGNOSIS — E11.42 DIABETIC PERIPHERAL NEUROPATHY: ICD-10-CM

## 2020-11-30 DIAGNOSIS — I12.9 HYPERTENSION ASSOCIATED WITH STAGE 3 CHRONIC KIDNEY DISEASE DUE TO TYPE 2 DIABETES MELLITUS: ICD-10-CM

## 2020-11-30 DIAGNOSIS — I25.10 CORONARY ARTERY DISEASE INVOLVING NATIVE CORONARY ARTERY OF NATIVE HEART WITHOUT ANGINA PECTORIS: ICD-10-CM

## 2020-11-30 DIAGNOSIS — I50.42 CHRONIC COMBINED SYSTOLIC AND DIASTOLIC CONGESTIVE HEART FAILURE: ICD-10-CM

## 2020-11-30 DIAGNOSIS — E11.22 HYPERTENSION ASSOCIATED WITH STAGE 3 CHRONIC KIDNEY DISEASE DUE TO TYPE 2 DIABETES MELLITUS: ICD-10-CM

## 2020-11-30 PROCEDURE — 3008F PR BODY MASS INDEX (BMI) DOCUMENTED: ICD-10-PCS | Mod: CPTII,S$GLB,, | Performed by: INTERNAL MEDICINE

## 2020-11-30 PROCEDURE — 1101F PT FALLS ASSESS-DOCD LE1/YR: CPT | Mod: CPTII,S$GLB,, | Performed by: INTERNAL MEDICINE

## 2020-11-30 PROCEDURE — 99214 OFFICE O/P EST MOD 30 MIN: CPT | Mod: S$GLB,,, | Performed by: INTERNAL MEDICINE

## 2020-11-30 PROCEDURE — 1159F MED LIST DOCD IN RCRD: CPT | Mod: S$GLB,,, | Performed by: INTERNAL MEDICINE

## 2020-11-30 PROCEDURE — 3052F HG A1C>EQUAL 8.0%<EQUAL 9.0%: CPT | Mod: CPTII,S$GLB,, | Performed by: INTERNAL MEDICINE

## 2020-11-30 PROCEDURE — 1126F PR PAIN SEVERITY QUANTIFIED, NO PAIN PRESENT: ICD-10-PCS | Mod: S$GLB,,, | Performed by: INTERNAL MEDICINE

## 2020-11-30 PROCEDURE — 3077F PR MOST RECENT SYSTOLIC BLOOD PRESSURE >= 140 MM HG: ICD-10-PCS | Mod: CPTII,S$GLB,, | Performed by: INTERNAL MEDICINE

## 2020-11-30 PROCEDURE — 3008F BODY MASS INDEX DOCD: CPT | Mod: CPTII,S$GLB,, | Performed by: INTERNAL MEDICINE

## 2020-11-30 PROCEDURE — 1126F AMNT PAIN NOTED NONE PRSNT: CPT | Mod: S$GLB,,, | Performed by: INTERNAL MEDICINE

## 2020-11-30 PROCEDURE — 3078F DIAST BP <80 MM HG: CPT | Mod: CPTII,S$GLB,, | Performed by: INTERNAL MEDICINE

## 2020-11-30 PROCEDURE — 1159F PR MEDICATION LIST DOCUMENTED IN MEDICAL RECORD: ICD-10-PCS | Mod: S$GLB,,, | Performed by: INTERNAL MEDICINE

## 2020-11-30 PROCEDURE — 1101F PR PT FALLS ASSESS DOC 0-1 FALLS W/OUT INJ PAST YR: ICD-10-PCS | Mod: CPTII,S$GLB,, | Performed by: INTERNAL MEDICINE

## 2020-11-30 PROCEDURE — 3288F FALL RISK ASSESSMENT DOCD: CPT | Mod: CPTII,S$GLB,, | Performed by: INTERNAL MEDICINE

## 2020-11-30 PROCEDURE — 99999 PR PBB SHADOW E&M-EST. PATIENT-LVL III: CPT | Mod: PBBFAC,,, | Performed by: INTERNAL MEDICINE

## 2020-11-30 PROCEDURE — 3052F PR MOST RECENT HEMOGLOBIN A1C LEVEL 8.0 - < 9.0%: ICD-10-PCS | Mod: CPTII,S$GLB,, | Performed by: INTERNAL MEDICINE

## 2020-11-30 PROCEDURE — 3078F PR MOST RECENT DIASTOLIC BLOOD PRESSURE < 80 MM HG: ICD-10-PCS | Mod: CPTII,S$GLB,, | Performed by: INTERNAL MEDICINE

## 2020-11-30 PROCEDURE — 3077F SYST BP >= 140 MM HG: CPT | Mod: CPTII,S$GLB,, | Performed by: INTERNAL MEDICINE

## 2020-11-30 PROCEDURE — 3288F PR FALLS RISK ASSESSMENT DOCUMENTED: ICD-10-PCS | Mod: CPTII,S$GLB,, | Performed by: INTERNAL MEDICINE

## 2020-11-30 PROCEDURE — 99999 PR PBB SHADOW E&M-EST. PATIENT-LVL III: ICD-10-PCS | Mod: PBBFAC,,, | Performed by: INTERNAL MEDICINE

## 2020-11-30 PROCEDURE — 99214 PR OFFICE/OUTPT VISIT, EST, LEVL IV, 30-39 MIN: ICD-10-PCS | Mod: S$GLB,,, | Performed by: INTERNAL MEDICINE

## 2020-11-30 RX ORDER — INSULIN GLARGINE 100 [IU]/ML
35 INJECTION, SOLUTION SUBCUTANEOUS 2 TIMES DAILY
Qty: 30 ML | Refills: 11 | Status: ON HOLD | OUTPATIENT
Start: 2020-11-30 | End: 2021-12-07 | Stop reason: SDUPTHER

## 2020-11-30 NOTE — PROGRESS NOTES
"HPI:  Patient is a 65-year-old man who comes today for follow-up of his diabetes, hypertension, lipids, coronary artery disease, chronic kidney disease (status post transplant).  Patient is taking his Lantus insulin 35 units twice a day but admits that he often decreases his mealtime insulin based on his readings.  He thinks the blood sugar less than 90 is abnormal for him.  I explained to him that it is not.  I would not change his mealtime insulin unless his blood sugar was less than 70.    Current meds have been verified and updated per the EMR  Exam:BP (!) 146/78 (BP Location: Right arm)   Pulse 101   Temp 98.2 °F (36.8 °C) (Tympanic)   Ht 5' 7" (1.702 m)   Wt (!) 137.3 kg (302 lb 11.1 oz)   SpO2 96%   BMI 47.41 kg/m²   Chest clear  Cardiovascular regular rate and rhythm with no significant murmur gallop or rub  Extremities with 2+ bilateral lower extremity edema    Lab Results   Component Value Date    WBC 7.86 11/23/2020    HGB 12.6 (L) 11/23/2020    HCT 43.3 11/23/2020     11/23/2020    CHOL 163 11/23/2020    TRIG 95 11/23/2020    HDL 54 11/23/2020    ALT 18 07/01/2020    AST 16 07/01/2020     (H) 11/23/2020    K 3.5 11/23/2020     11/23/2020    CREATININE 1.7 (H) 11/23/2020    BUN 19 11/23/2020    CO2 32 (H) 11/23/2020    TSH 0.852 11/23/2020    PSA 0.31 04/04/2019    INR 1.0 06/18/2015    HGBA1C 8.0 (H) 11/23/2020       Impression:  Diabetes, not to goal  Numerous other medical problems identified below, stable  Patient Active Problem List   Diagnosis    Anemia associated with chronic renal failure    Obesity    Acquired renal cyst of left kidney    Nephrolithiasis    Sleep apnea    Pseudophakia    CAD (coronary artery disease)    Living-related donor kidney transplant (daughter) - 6/12/15    Diabetic peripheral neuropathy    Chronic immunosuppression with Prograf, MMF and prednisone    Secondary hyperparathyroidism of renal origin    CKD (chronic kidney disease) stage " 3, GFR 30-59 ml/min    Type II diabetes mellitus with renal manifestations    Hypertension associated with stage 3 chronic kidney disease due to type 2 diabetes mellitus    DM (diabetes mellitus), type 2 with complications    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, with long-term current use of insulin    Epiretinal membrane (ERM) of both eyes    History of colon polyps    Benign prostatic hyperplasia without lower urinary tract symptoms    PCO (posterior capsular opacification), left    Chronic combined systolic and diastolic congestive heart failure    Deep vein thrombosis (DVT) of lower extremity       Plan:  Orders Placed This Encounter    Hemoglobin A1C    Basic Metabolic Panel     Needs to take his mealtime insulin 25 units with each meal.  Patient will see me again in 3 months with above lab work.    This note is generated with speech recognition software and is subject to transcription error and sound alike phrases that may be missed by proofreading.

## 2020-12-07 ENCOUNTER — PATIENT OUTREACH (OUTPATIENT)
Dept: ADMINISTRATIVE | Facility: HOSPITAL | Age: 65
End: 2020-12-07

## 2020-12-07 NOTE — PROGRESS NOTES
Pt seen 11/30/20 with elevated bp at 146/78. Called for updated bp reading. States his machine is broken. Nurse visit scheduled.

## 2020-12-08 ENCOUNTER — CLINICAL SUPPORT (OUTPATIENT)
Dept: INTERNAL MEDICINE | Facility: CLINIC | Age: 65
End: 2020-12-08
Payer: COMMERCIAL

## 2020-12-08 ENCOUNTER — TELEPHONE (OUTPATIENT)
Dept: INTERNAL MEDICINE | Facility: CLINIC | Age: 65
End: 2020-12-08

## 2020-12-08 VITALS
DIASTOLIC BLOOD PRESSURE: 82 MMHG | HEART RATE: 100 BPM | OXYGEN SATURATION: 96 % | TEMPERATURE: 99 F | SYSTOLIC BLOOD PRESSURE: 138 MMHG

## 2020-12-08 PROCEDURE — 99999 PR PBB SHADOW E&M-EST. PATIENT-LVL II: ICD-10-PCS | Mod: PBBFAC,,,

## 2020-12-08 PROCEDURE — 99999 PR PBB SHADOW E&M-EST. PATIENT-LVL II: CPT | Mod: PBBFAC,,,

## 2020-12-10 RX ORDER — ERGOCALCIFEROL 1.25 MG/1
CAPSULE ORAL
Qty: 13 CAPSULE | Refills: 3 | Status: SHIPPED | OUTPATIENT
Start: 2020-12-10 | End: 2021-06-07 | Stop reason: SDUPTHER

## 2020-12-16 RX ORDER — KETOCONAZOLE 200 MG/1
100 TABLET ORAL DAILY
Qty: 15 TABLET | Refills: 9 | Status: SHIPPED | OUTPATIENT
Start: 2020-12-16 | End: 2021-06-11 | Stop reason: SDUPTHER

## 2021-01-05 ENCOUNTER — PATIENT MESSAGE (OUTPATIENT)
Dept: PHARMACY | Facility: CLINIC | Age: 66
End: 2021-01-05

## 2021-01-06 ENCOUNTER — LAB VISIT (OUTPATIENT)
Dept: PRIMARY CARE CLINIC | Facility: OTHER | Age: 66
End: 2021-01-06
Attending: INTERNAL MEDICINE
Payer: COMMERCIAL

## 2021-01-06 DIAGNOSIS — R05.9 COUGH: Primary | ICD-10-CM

## 2021-01-06 PROCEDURE — U0003 INFECTIOUS AGENT DETECTION BY NUCLEIC ACID (DNA OR RNA); SEVERE ACUTE RESPIRATORY SYNDROME CORONAVIRUS 2 (SARS-COV-2) (CORONAVIRUS DISEASE [COVID-19]), AMPLIFIED PROBE TECHNIQUE, MAKING USE OF HIGH THROUGHPUT TECHNOLOGIES AS DESCRIBED BY CMS-2020-01-R: HCPCS

## 2021-01-07 ENCOUNTER — PATIENT OUTREACH (OUTPATIENT)
Dept: ADMINISTRATIVE | Facility: OTHER | Age: 66
End: 2021-01-07

## 2021-01-08 ENCOUNTER — HOSPITAL ENCOUNTER (OUTPATIENT)
Dept: CARDIOLOGY | Facility: HOSPITAL | Age: 66
Discharge: HOME | End: 2021-01-08
Attending: INTERNAL MEDICINE
Payer: COMMERCIAL

## 2021-01-08 ENCOUNTER — OFFICE VISIT (OUTPATIENT)
Dept: CARDIOLOGY | Facility: CLINIC | Age: 66
End: 2021-01-08
Payer: COMMERCIAL

## 2021-01-08 ENCOUNTER — PATIENT MESSAGE (OUTPATIENT)
Dept: SURGERY | Facility: CLINIC | Age: 66
End: 2021-01-08

## 2021-01-08 ENCOUNTER — PATIENT MESSAGE (OUTPATIENT)
Dept: TRANSPLANT | Facility: CLINIC | Age: 66
End: 2021-01-08

## 2021-01-08 VITALS
DIASTOLIC BLOOD PRESSURE: 78 MMHG | OXYGEN SATURATION: 97 % | WEIGHT: 297.38 LBS | BODY MASS INDEX: 46.58 KG/M2 | SYSTOLIC BLOOD PRESSURE: 130 MMHG | HEART RATE: 113 BPM

## 2021-01-08 DIAGNOSIS — I25.10 CORONARY ARTERY DISEASE INVOLVING NATIVE CORONARY ARTERY OF NATIVE HEART WITHOUT ANGINA PECTORIS: Primary | ICD-10-CM

## 2021-01-08 DIAGNOSIS — N18.31 STAGE 3A CHRONIC KIDNEY DISEASE: ICD-10-CM

## 2021-01-08 DIAGNOSIS — I25.10 CORONARY ARTERY DISEASE INVOLVING NATIVE CORONARY ARTERY OF NATIVE HEART WITHOUT ANGINA PECTORIS: ICD-10-CM

## 2021-01-08 DIAGNOSIS — I50.42 CHRONIC COMBINED SYSTOLIC AND DIASTOLIC CONGESTIVE HEART FAILURE: ICD-10-CM

## 2021-01-08 DIAGNOSIS — E11.21 TYPE 2 DIABETES MELLITUS WITH DIABETIC NEPHROPATHY, UNSPECIFIED WHETHER LONG TERM INSULIN USE: ICD-10-CM

## 2021-01-08 DIAGNOSIS — I12.9 HYPERTENSION ASSOCIATED WITH STAGE 3 CHRONIC KIDNEY DISEASE DUE TO TYPE 2 DIABETES MELLITUS: ICD-10-CM

## 2021-01-08 DIAGNOSIS — E11.22 HYPERTENSION ASSOCIATED WITH STAGE 3 CHRONIC KIDNEY DISEASE DUE TO TYPE 2 DIABETES MELLITUS: ICD-10-CM

## 2021-01-08 DIAGNOSIS — Z94.0 KIDNEY TRANSPLANT STATUS, LIVING RELATED DONOR: Chronic | ICD-10-CM

## 2021-01-08 DIAGNOSIS — E66.01 MORBID OBESITY: ICD-10-CM

## 2021-01-08 DIAGNOSIS — I82.492 ACUTE DEEP VEIN THROMBOSIS (DVT) OF OTHER SPECIFIED VEIN OF LEFT LOWER EXTREMITY: ICD-10-CM

## 2021-01-08 DIAGNOSIS — N18.30 HYPERTENSION ASSOCIATED WITH STAGE 3 CHRONIC KIDNEY DISEASE DUE TO TYPE 2 DIABETES MELLITUS: ICD-10-CM

## 2021-01-08 LAB — SARS-COV-2 RNA RESP QL NAA+PROBE: NOT DETECTED

## 2021-01-08 PROCEDURE — 1159F PR MEDICATION LIST DOCUMENTED IN MEDICAL RECORD: ICD-10-PCS | Mod: S$GLB,,, | Performed by: INTERNAL MEDICINE

## 2021-01-08 PROCEDURE — 3075F SYST BP GE 130 - 139MM HG: CPT | Mod: CPTII,S$GLB,, | Performed by: INTERNAL MEDICINE

## 2021-01-08 PROCEDURE — 99999 PR PBB SHADOW E&M-EST. PATIENT-LVL III: ICD-10-PCS | Mod: PBBFAC,,, | Performed by: INTERNAL MEDICINE

## 2021-01-08 PROCEDURE — 3052F PR MOST RECENT HEMOGLOBIN A1C LEVEL 8.0 - < 9.0%: ICD-10-PCS | Mod: CPTII,S$GLB,, | Performed by: INTERNAL MEDICINE

## 2021-01-08 PROCEDURE — 3075F PR MOST RECENT SYSTOLIC BLOOD PRESS GE 130-139MM HG: ICD-10-PCS | Mod: CPTII,S$GLB,, | Performed by: INTERNAL MEDICINE

## 2021-01-08 PROCEDURE — 99214 OFFICE O/P EST MOD 30 MIN: CPT | Mod: 25,S$GLB,, | Performed by: INTERNAL MEDICINE

## 2021-01-08 PROCEDURE — 3078F DIAST BP <80 MM HG: CPT | Mod: CPTII,S$GLB,, | Performed by: INTERNAL MEDICINE

## 2021-01-08 PROCEDURE — 1159F MED LIST DOCD IN RCRD: CPT | Mod: S$GLB,,, | Performed by: INTERNAL MEDICINE

## 2021-01-08 PROCEDURE — 99999 PR PBB SHADOW E&M-EST. PATIENT-LVL III: CPT | Mod: PBBFAC,,, | Performed by: INTERNAL MEDICINE

## 2021-01-08 PROCEDURE — 93010 ELECTROCARDIOGRAM REPORT: CPT | Mod: ,,, | Performed by: INTERNAL MEDICINE

## 2021-01-08 PROCEDURE — 99214 PR OFFICE/OUTPT VISIT, EST, LEVL IV, 30-39 MIN: ICD-10-PCS | Mod: 25,S$GLB,, | Performed by: INTERNAL MEDICINE

## 2021-01-08 PROCEDURE — 3008F BODY MASS INDEX DOCD: CPT | Mod: CPTII,S$GLB,, | Performed by: INTERNAL MEDICINE

## 2021-01-08 PROCEDURE — 93010 EKG 12-LEAD: ICD-10-PCS | Mod: ,,, | Performed by: INTERNAL MEDICINE

## 2021-01-08 PROCEDURE — 93005 ELECTROCARDIOGRAM TRACING: CPT

## 2021-01-08 PROCEDURE — 3078F PR MOST RECENT DIASTOLIC BLOOD PRESSURE < 80 MM HG: ICD-10-PCS | Mod: CPTII,S$GLB,, | Performed by: INTERNAL MEDICINE

## 2021-01-08 PROCEDURE — 3052F HG A1C>EQUAL 8.0%<EQUAL 9.0%: CPT | Mod: CPTII,S$GLB,, | Performed by: INTERNAL MEDICINE

## 2021-01-08 PROCEDURE — 3008F PR BODY MASS INDEX (BMI) DOCUMENTED: ICD-10-PCS | Mod: CPTII,S$GLB,, | Performed by: INTERNAL MEDICINE

## 2021-02-15 ENCOUNTER — PATIENT MESSAGE (OUTPATIENT)
Dept: PHARMACY | Facility: CLINIC | Age: 66
End: 2021-02-15

## 2021-02-27 ENCOUNTER — PATIENT OUTREACH (OUTPATIENT)
Dept: ADMINISTRATIVE | Facility: OTHER | Age: 66
End: 2021-02-27

## 2021-03-01 ENCOUNTER — OFFICE VISIT (OUTPATIENT)
Dept: OPHTHALMOLOGY | Facility: CLINIC | Age: 66
End: 2021-03-01
Payer: COMMERCIAL

## 2021-03-01 DIAGNOSIS — E11.3553 TYPE 2 DIABETES MELLITUS WITH STABLE PROLIFERATIVE RETINOPATHY OF BOTH EYES, WITH LONG-TERM CURRENT USE OF INSULIN: Primary | ICD-10-CM

## 2021-03-01 DIAGNOSIS — I50.42 CHRONIC COMBINED SYSTOLIC AND DIASTOLIC CONGESTIVE HEART FAILURE: ICD-10-CM

## 2021-03-01 DIAGNOSIS — Z96.1 PSEUDOPHAKIA: ICD-10-CM

## 2021-03-01 DIAGNOSIS — Z79.4 TYPE 2 DIABETES MELLITUS WITH STABLE PROLIFERATIVE RETINOPATHY OF BOTH EYES, WITH LONG-TERM CURRENT USE OF INSULIN: Primary | ICD-10-CM

## 2021-03-01 DIAGNOSIS — H26.492 PCO (POSTERIOR CAPSULAR OPACIFICATION), LEFT: ICD-10-CM

## 2021-03-01 PROCEDURE — 1159F MED LIST DOCD IN RCRD: CPT | Mod: S$GLB,,, | Performed by: OPHTHALMOLOGY

## 2021-03-01 PROCEDURE — 92134 CPTRZ OPH DX IMG PST SGM RTA: CPT | Mod: S$GLB,,, | Performed by: OPHTHALMOLOGY

## 2021-03-01 PROCEDURE — 92134 POSTERIOR SEGMENT OCT RETINA (OCULAR COHERENCE TOMOGRAPHY)-BOTH EYES: ICD-10-PCS | Mod: S$GLB,,, | Performed by: OPHTHALMOLOGY

## 2021-03-01 PROCEDURE — 99213 OFFICE O/P EST LOW 20 MIN: CPT | Mod: S$GLB,,, | Performed by: OPHTHALMOLOGY

## 2021-03-01 PROCEDURE — 99999 PR PBB SHADOW E&M-EST. PATIENT-LVL II: ICD-10-PCS | Mod: PBBFAC,,, | Performed by: OPHTHALMOLOGY

## 2021-03-01 PROCEDURE — 99213 PR OFFICE/OUTPT VISIT, EST, LEVL III, 20-29 MIN: ICD-10-PCS | Mod: S$GLB,,, | Performed by: OPHTHALMOLOGY

## 2021-03-01 PROCEDURE — 3052F PR MOST RECENT HEMOGLOBIN A1C LEVEL 8.0 - < 9.0%: ICD-10-PCS | Mod: CPTII,S$GLB,, | Performed by: OPHTHALMOLOGY

## 2021-03-01 PROCEDURE — 3052F HG A1C>EQUAL 8.0%<EQUAL 9.0%: CPT | Mod: CPTII,S$GLB,, | Performed by: OPHTHALMOLOGY

## 2021-03-01 PROCEDURE — 1159F PR MEDICATION LIST DOCUMENTED IN MEDICAL RECORD: ICD-10-PCS | Mod: S$GLB,,, | Performed by: OPHTHALMOLOGY

## 2021-03-01 PROCEDURE — 1126F PR PAIN SEVERITY QUANTIFIED, NO PAIN PRESENT: ICD-10-PCS | Mod: S$GLB,,, | Performed by: OPHTHALMOLOGY

## 2021-03-01 PROCEDURE — 1126F AMNT PAIN NOTED NONE PRSNT: CPT | Mod: S$GLB,,, | Performed by: OPHTHALMOLOGY

## 2021-03-01 PROCEDURE — 99999 PR PBB SHADOW E&M-EST. PATIENT-LVL II: CPT | Mod: PBBFAC,,, | Performed by: OPHTHALMOLOGY

## 2021-03-01 RX ORDER — SACUBITRIL AND VALSARTAN 24; 26 MG/1; MG/1
1 TABLET, FILM COATED ORAL 2 TIMES DAILY
Qty: 60 TABLET | Refills: 6 | Status: SHIPPED | OUTPATIENT
Start: 2021-03-01 | End: 2021-07-16

## 2021-03-02 ENCOUNTER — OFFICE VISIT (OUTPATIENT)
Dept: INTERNAL MEDICINE | Facility: CLINIC | Age: 66
End: 2021-03-02
Payer: COMMERCIAL

## 2021-03-02 ENCOUNTER — LAB VISIT (OUTPATIENT)
Dept: LAB | Facility: HOSPITAL | Age: 66
End: 2021-03-02
Attending: INTERNAL MEDICINE
Payer: COMMERCIAL

## 2021-03-02 VITALS
HEART RATE: 107 BPM | WEIGHT: 303.13 LBS | HEIGHT: 67 IN | BODY MASS INDEX: 47.58 KG/M2 | TEMPERATURE: 94 F | OXYGEN SATURATION: 96 % | SYSTOLIC BLOOD PRESSURE: 160 MMHG | DIASTOLIC BLOOD PRESSURE: 84 MMHG

## 2021-03-02 DIAGNOSIS — G47.33 OBSTRUCTIVE SLEEP APNEA SYNDROME: ICD-10-CM

## 2021-03-02 DIAGNOSIS — E11.3553 TYPE 2 DIABETES MELLITUS WITH STABLE PROLIFERATIVE RETINOPATHY OF BOTH EYES, WITH LONG-TERM CURRENT USE OF INSULIN: ICD-10-CM

## 2021-03-02 DIAGNOSIS — Z79.4 TYPE 2 DIABETES MELLITUS WITH STABLE PROLIFERATIVE RETINOPATHY OF BOTH EYES, WITH LONG-TERM CURRENT USE OF INSULIN: ICD-10-CM

## 2021-03-02 DIAGNOSIS — E11.21 TYPE 2 DIABETES MELLITUS WITH DIABETIC NEPHROPATHY, UNSPECIFIED WHETHER LONG TERM INSULIN USE: ICD-10-CM

## 2021-03-02 DIAGNOSIS — N18.31 STAGE 3A CHRONIC KIDNEY DISEASE: ICD-10-CM

## 2021-03-02 DIAGNOSIS — E11.22 HYPERTENSION ASSOCIATED WITH STAGE 3 CHRONIC KIDNEY DISEASE DUE TO TYPE 2 DIABETES MELLITUS: ICD-10-CM

## 2021-03-02 DIAGNOSIS — I50.42 CHRONIC COMBINED SYSTOLIC AND DIASTOLIC CONGESTIVE HEART FAILURE: ICD-10-CM

## 2021-03-02 DIAGNOSIS — D63.1 ANEMIA ASSOCIATED WITH CHRONIC RENAL FAILURE: ICD-10-CM

## 2021-03-02 DIAGNOSIS — N18.9 ANEMIA ASSOCIATED WITH CHRONIC RENAL FAILURE: ICD-10-CM

## 2021-03-02 DIAGNOSIS — I25.10 CORONARY ARTERY DISEASE INVOLVING NATIVE CORONARY ARTERY OF NATIVE HEART WITHOUT ANGINA PECTORIS: ICD-10-CM

## 2021-03-02 DIAGNOSIS — N25.81 SECONDARY HYPERPARATHYROIDISM OF RENAL ORIGIN: ICD-10-CM

## 2021-03-02 DIAGNOSIS — I12.9 HYPERTENSION ASSOCIATED WITH STAGE 3 CHRONIC KIDNEY DISEASE DUE TO TYPE 2 DIABETES MELLITUS: ICD-10-CM

## 2021-03-02 DIAGNOSIS — E11.42 DIABETIC PERIPHERAL NEUROPATHY: Primary | ICD-10-CM

## 2021-03-02 DIAGNOSIS — E11.8 DM (DIABETES MELLITUS), TYPE 2 WITH COMPLICATIONS: ICD-10-CM

## 2021-03-02 DIAGNOSIS — N18.30 HYPERTENSION ASSOCIATED WITH STAGE 3 CHRONIC KIDNEY DISEASE DUE TO TYPE 2 DIABETES MELLITUS: ICD-10-CM

## 2021-03-02 DIAGNOSIS — I82.492 ACUTE DEEP VEIN THROMBOSIS (DVT) OF OTHER SPECIFIED VEIN OF LEFT LOWER EXTREMITY: ICD-10-CM

## 2021-03-02 PROCEDURE — 83036 HEMOGLOBIN GLYCOSYLATED A1C: CPT

## 2021-03-02 PROCEDURE — 99214 OFFICE O/P EST MOD 30 MIN: CPT | Mod: S$GLB,,, | Performed by: INTERNAL MEDICINE

## 2021-03-02 PROCEDURE — 3288F PR FALLS RISK ASSESSMENT DOCUMENTED: ICD-10-PCS | Mod: CPTII,S$GLB,, | Performed by: INTERNAL MEDICINE

## 2021-03-02 PROCEDURE — 3052F PR MOST RECENT HEMOGLOBIN A1C LEVEL 8.0 - < 9.0%: ICD-10-PCS | Mod: CPTII,S$GLB,, | Performed by: INTERNAL MEDICINE

## 2021-03-02 PROCEDURE — 3079F PR MOST RECENT DIASTOLIC BLOOD PRESSURE 80-89 MM HG: ICD-10-PCS | Mod: CPTII,S$GLB,, | Performed by: INTERNAL MEDICINE

## 2021-03-02 PROCEDURE — 3008F BODY MASS INDEX DOCD: CPT | Mod: CPTII,S$GLB,, | Performed by: INTERNAL MEDICINE

## 2021-03-02 PROCEDURE — 3077F SYST BP >= 140 MM HG: CPT | Mod: CPTII,S$GLB,, | Performed by: INTERNAL MEDICINE

## 2021-03-02 PROCEDURE — 1101F PT FALLS ASSESS-DOCD LE1/YR: CPT | Mod: CPTII,S$GLB,, | Performed by: INTERNAL MEDICINE

## 2021-03-02 PROCEDURE — 1126F AMNT PAIN NOTED NONE PRSNT: CPT | Mod: S$GLB,,, | Performed by: INTERNAL MEDICINE

## 2021-03-02 PROCEDURE — 99214 PR OFFICE/OUTPT VISIT, EST, LEVL IV, 30-39 MIN: ICD-10-PCS | Mod: S$GLB,,, | Performed by: INTERNAL MEDICINE

## 2021-03-02 PROCEDURE — 80048 BASIC METABOLIC PNL TOTAL CA: CPT

## 2021-03-02 PROCEDURE — 36415 COLL VENOUS BLD VENIPUNCTURE: CPT | Mod: PO

## 2021-03-02 PROCEDURE — 99999 PR PBB SHADOW E&M-EST. PATIENT-LVL III: CPT | Mod: PBBFAC,,, | Performed by: INTERNAL MEDICINE

## 2021-03-02 PROCEDURE — 3077F PR MOST RECENT SYSTOLIC BLOOD PRESSURE >= 140 MM HG: ICD-10-PCS | Mod: CPTII,S$GLB,, | Performed by: INTERNAL MEDICINE

## 2021-03-02 PROCEDURE — 3008F PR BODY MASS INDEX (BMI) DOCUMENTED: ICD-10-PCS | Mod: CPTII,S$GLB,, | Performed by: INTERNAL MEDICINE

## 2021-03-02 PROCEDURE — 99999 PR PBB SHADOW E&M-EST. PATIENT-LVL III: ICD-10-PCS | Mod: PBBFAC,,, | Performed by: INTERNAL MEDICINE

## 2021-03-02 PROCEDURE — 1101F PR PT FALLS ASSESS DOC 0-1 FALLS W/OUT INJ PAST YR: ICD-10-PCS | Mod: CPTII,S$GLB,, | Performed by: INTERNAL MEDICINE

## 2021-03-02 PROCEDURE — 3052F HG A1C>EQUAL 8.0%<EQUAL 9.0%: CPT | Mod: CPTII,S$GLB,, | Performed by: INTERNAL MEDICINE

## 2021-03-02 PROCEDURE — 1126F PR PAIN SEVERITY QUANTIFIED, NO PAIN PRESENT: ICD-10-PCS | Mod: S$GLB,,, | Performed by: INTERNAL MEDICINE

## 2021-03-02 PROCEDURE — 3288F FALL RISK ASSESSMENT DOCD: CPT | Mod: CPTII,S$GLB,, | Performed by: INTERNAL MEDICINE

## 2021-03-02 PROCEDURE — 1159F PR MEDICATION LIST DOCUMENTED IN MEDICAL RECORD: ICD-10-PCS | Mod: S$GLB,,, | Performed by: INTERNAL MEDICINE

## 2021-03-02 PROCEDURE — 1159F MED LIST DOCD IN RCRD: CPT | Mod: S$GLB,,, | Performed by: INTERNAL MEDICINE

## 2021-03-02 PROCEDURE — 3079F DIAST BP 80-89 MM HG: CPT | Mod: CPTII,S$GLB,, | Performed by: INTERNAL MEDICINE

## 2021-03-02 RX ORDER — HYDRALAZINE HYDROCHLORIDE 50 MG/1
50 TABLET, FILM COATED ORAL 3 TIMES DAILY
Qty: 270 TABLET | Refills: 3 | Status: SHIPPED | OUTPATIENT
Start: 2021-03-02 | End: 2021-12-29 | Stop reason: SDUPTHER

## 2021-03-03 ENCOUNTER — PATIENT OUTREACH (OUTPATIENT)
Dept: ADMINISTRATIVE | Facility: HOSPITAL | Age: 66
End: 2021-03-03

## 2021-03-03 LAB
ANION GAP SERPL CALC-SCNC: 11 MMOL/L (ref 8–16)
BUN SERPL-MCNC: 14 MG/DL (ref 8–23)
CALCIUM SERPL-MCNC: 8.7 MG/DL (ref 8.7–10.5)
CHLORIDE SERPL-SCNC: 103 MMOL/L (ref 95–110)
CO2 SERPL-SCNC: 32 MMOL/L (ref 23–29)
CREAT SERPL-MCNC: 1.6 MG/DL (ref 0.5–1.4)
EST. GFR  (AFRICAN AMERICAN): 50.8 ML/MIN/1.73 M^2
EST. GFR  (NON AFRICAN AMERICAN): 43.9 ML/MIN/1.73 M^2
ESTIMATED AVG GLUCOSE: 146 MG/DL (ref 68–131)
GLUCOSE SERPL-MCNC: 125 MG/DL (ref 70–110)
HBA1C MFR BLD: 6.7 % (ref 4–5.6)
POTASSIUM SERPL-SCNC: 3.6 MMOL/L (ref 3.5–5.1)
SODIUM SERPL-SCNC: 146 MMOL/L (ref 136–145)

## 2021-03-05 ENCOUNTER — IMMUNIZATION (OUTPATIENT)
Dept: INTERNAL MEDICINE | Facility: CLINIC | Age: 66
End: 2021-03-05
Payer: COMMERCIAL

## 2021-03-05 DIAGNOSIS — Z23 NEED FOR VACCINATION: Primary | ICD-10-CM

## 2021-03-05 PROCEDURE — 91300 COVID-19, MRNA, LNP-S, PF, 30 MCG/0.3 ML DOSE VACCINE: CPT | Mod: PBBFAC | Performed by: FAMILY MEDICINE

## 2021-03-08 RX ORDER — TAMSULOSIN HYDROCHLORIDE 0.4 MG/1
1 CAPSULE ORAL DAILY
Qty: 30 CAPSULE | Refills: 11 | Status: SHIPPED | OUTPATIENT
Start: 2021-03-08 | End: 2022-02-18 | Stop reason: SDUPTHER

## 2021-03-08 RX ORDER — METOPROLOL SUCCINATE 25 MG/1
25 TABLET, EXTENDED RELEASE ORAL DAILY
Qty: 30 TABLET | Refills: 11 | Status: SHIPPED | OUTPATIENT
Start: 2021-03-08 | End: 2022-02-18 | Stop reason: SDUPTHER

## 2021-03-17 RX ORDER — PEN NEEDLE, DIABETIC 31 GX5/16"
NEEDLE, DISPOSABLE MISCELLANEOUS
Qty: 100 EACH | Refills: 3 | Status: SHIPPED | OUTPATIENT
Start: 2021-03-17 | End: 2022-10-28 | Stop reason: SDUPTHER

## 2021-03-19 ENCOUNTER — HOSPITAL ENCOUNTER (EMERGENCY)
Facility: HOSPITAL | Age: 66
Discharge: HOME | End: 2021-03-19
Attending: EMERGENCY MEDICINE
Payer: COMMERCIAL

## 2021-03-19 VITALS
HEART RATE: 87 BPM | RESPIRATION RATE: 17 BRPM | OXYGEN SATURATION: 97 % | TEMPERATURE: 99 F | SYSTOLIC BLOOD PRESSURE: 152 MMHG | BODY MASS INDEX: 47.24 KG/M2 | DIASTOLIC BLOOD PRESSURE: 75 MMHG | WEIGHT: 301.56 LBS

## 2021-03-19 DIAGNOSIS — R06.02 SOB (SHORTNESS OF BREATH): ICD-10-CM

## 2021-03-19 DIAGNOSIS — R53.1 WEAKNESS: ICD-10-CM

## 2021-03-19 DIAGNOSIS — R60.0 PEDAL EDEMA: Primary | ICD-10-CM

## 2021-03-19 LAB
ALBUMIN SERPL BCP-MCNC: 3.5 G/DL (ref 3.5–5.2)
ALP SERPL-CCNC: 71 U/L (ref 55–135)
ALT SERPL W/O P-5'-P-CCNC: 16 U/L (ref 10–44)
ANION GAP SERPL CALC-SCNC: 11 MMOL/L (ref 8–16)
AST SERPL-CCNC: 21 U/L (ref 10–40)
BASOPHILS # BLD AUTO: 0.01 K/UL (ref 0–0.2)
BASOPHILS NFR BLD: 0.1 % (ref 0–1.9)
BILIRUB SERPL-MCNC: 0.4 MG/DL (ref 0.1–1)
BILIRUB UR QL STRIP: NEGATIVE
BNP SERPL-MCNC: 59 PG/ML (ref 0–99)
BUN SERPL-MCNC: 18 MG/DL (ref 8–23)
CALCIUM SERPL-MCNC: 8.5 MG/DL (ref 8.7–10.5)
CHLORIDE SERPL-SCNC: 105 MMOL/L (ref 95–110)
CK SERPL-CCNC: 182 U/L (ref 20–200)
CLARITY UR: CLEAR
CO2 SERPL-SCNC: 28 MMOL/L (ref 23–29)
COLOR UR: YELLOW
CREAT SERPL-MCNC: 1.5 MG/DL (ref 0.5–1.4)
DIFFERENTIAL METHOD: ABNORMAL
EOSINOPHIL # BLD AUTO: 0 K/UL (ref 0–0.5)
EOSINOPHIL NFR BLD: 0.4 % (ref 0–8)
ERYTHROCYTE [DISTWIDTH] IN BLOOD BY AUTOMATED COUNT: 13.4 % (ref 11.5–14.5)
EST. GFR  (AFRICAN AMERICAN): 55 ML/MIN/1.73 M^2
EST. GFR  (NON AFRICAN AMERICAN): 47 ML/MIN/1.73 M^2
GLUCOSE SERPL-MCNC: 116 MG/DL (ref 70–110)
GLUCOSE UR QL STRIP: NEGATIVE
HCT VFR BLD AUTO: 42.7 % (ref 40–54)
HCV AB SERPL QL IA: POSITIVE
HGB BLD-MCNC: 12.5 G/DL (ref 14–18)
HGB UR QL STRIP: NEGATIVE
IMM GRANULOCYTES # BLD AUTO: 0.04 K/UL (ref 0–0.04)
IMM GRANULOCYTES NFR BLD AUTO: 0.5 % (ref 0–0.5)
KETONES UR QL STRIP: NEGATIVE
LEUKOCYTE ESTERASE UR QL STRIP: NEGATIVE
LYMPHOCYTES # BLD AUTO: 0.8 K/UL (ref 1–4.8)
LYMPHOCYTES NFR BLD: 9.1 % (ref 18–48)
MCH RBC QN AUTO: 25.4 PG (ref 27–31)
MCHC RBC AUTO-ENTMCNC: 29.3 G/DL (ref 32–36)
MCV RBC AUTO: 87 FL (ref 82–98)
MONOCYTES # BLD AUTO: 0.6 K/UL (ref 0.3–1)
MONOCYTES NFR BLD: 6.9 % (ref 4–15)
NEUTROPHILS # BLD AUTO: 6.8 K/UL (ref 1.8–7.7)
NEUTROPHILS NFR BLD: 83 % (ref 38–73)
NITRITE UR QL STRIP: NEGATIVE
NRBC BLD-RTO: 0 /100 WBC
PH UR STRIP: 6 [PH] (ref 5–8)
PLATELET # BLD AUTO: 177 K/UL (ref 150–350)
PMV BLD AUTO: 11.1 FL (ref 9.2–12.9)
POTASSIUM SERPL-SCNC: 3.8 MMOL/L (ref 3.5–5.1)
PROT SERPL-MCNC: 6.7 G/DL (ref 6–8.4)
PROT UR QL STRIP: NEGATIVE
RBC # BLD AUTO: 4.92 M/UL (ref 4.6–6.2)
SODIUM SERPL-SCNC: 144 MMOL/L (ref 136–145)
SP GR UR STRIP: 1.02 (ref 1–1.03)
TROPONIN I SERPL DL<=0.01 NG/ML-MCNC: 0.03 NG/ML (ref 0–0.03)
URN SPEC COLLECT METH UR: NORMAL
UROBILINOGEN UR STRIP-ACNC: NEGATIVE EU/DL
WBC # BLD AUTO: 8.24 K/UL (ref 3.9–12.7)

## 2021-03-19 PROCEDURE — 93005 ELECTROCARDIOGRAM TRACING: CPT

## 2021-03-19 PROCEDURE — 96374 THER/PROPH/DIAG INJ IV PUSH: CPT

## 2021-03-19 PROCEDURE — 86803 HEPATITIS C AB TEST: CPT | Performed by: EMERGENCY MEDICINE

## 2021-03-19 PROCEDURE — 93010 EKG 12-LEAD: ICD-10-PCS | Mod: ,,, | Performed by: INTERNAL MEDICINE

## 2021-03-19 PROCEDURE — 63600175 PHARM REV CODE 636 W HCPCS: Performed by: NURSE PRACTITIONER

## 2021-03-19 PROCEDURE — 87522 HEPATITIS C REVRS TRNSCRPJ: CPT | Performed by: EMERGENCY MEDICINE

## 2021-03-19 PROCEDURE — 85025 COMPLETE CBC W/AUTO DIFF WBC: CPT | Performed by: NURSE PRACTITIONER

## 2021-03-19 PROCEDURE — 82550 ASSAY OF CK (CPK): CPT | Performed by: NURSE PRACTITIONER

## 2021-03-19 PROCEDURE — 84484 ASSAY OF TROPONIN QUANT: CPT | Performed by: NURSE PRACTITIONER

## 2021-03-19 PROCEDURE — 81003 URINALYSIS AUTO W/O SCOPE: CPT | Performed by: NURSE PRACTITIONER

## 2021-03-19 PROCEDURE — 83880 ASSAY OF NATRIURETIC PEPTIDE: CPT | Performed by: NURSE PRACTITIONER

## 2021-03-19 PROCEDURE — 80053 COMPREHEN METABOLIC PANEL: CPT | Performed by: NURSE PRACTITIONER

## 2021-03-19 PROCEDURE — 99285 EMERGENCY DEPT VISIT HI MDM: CPT | Mod: 25

## 2021-03-19 PROCEDURE — 93010 ELECTROCARDIOGRAM REPORT: CPT | Mod: ,,, | Performed by: INTERNAL MEDICINE

## 2021-03-19 RX ORDER — FUROSEMIDE 10 MG/ML
40 INJECTION INTRAMUSCULAR; INTRAVENOUS
Status: COMPLETED | OUTPATIENT
Start: 2021-03-19 | End: 2021-03-19

## 2021-03-19 RX ADMIN — FUROSEMIDE 40 MG: 10 INJECTION, SOLUTION INTRAMUSCULAR; INTRAVENOUS at 06:03

## 2021-03-22 NOTE — OR NURSING
Family updated as to patient's status.- daughter   Quinolones Counseling:  I discussed with the patient the risks of fluoroquinolones including but not limited to GI upset, allergic reaction, drug rash, diarrhea, dizziness, photosensitivity, yeast infections, liver function test abnormalities, tendonitis/tendon rupture.

## 2021-03-23 LAB
HCV RNA SERPL NAA+PROBE-LOG IU: <1.08 LOG (10) IU/ML
HCV RNA SERPL QL NAA+PROBE: NOT DETECTED IU/ML
HCV RNA SPEC NAA+PROBE-ACNC: <12 IU/ML

## 2021-03-26 ENCOUNTER — IMMUNIZATION (OUTPATIENT)
Dept: INTERNAL MEDICINE | Facility: CLINIC | Age: 66
End: 2021-03-26
Payer: COMMERCIAL

## 2021-03-26 DIAGNOSIS — Z23 NEED FOR VACCINATION: Primary | ICD-10-CM

## 2021-03-26 PROCEDURE — 0002A COVID-19, MRNA, LNP-S, PF, 30 MCG/0.3 ML DOSE VACCINE: CPT | Mod: PBBFAC | Performed by: FAMILY MEDICINE

## 2021-03-26 PROCEDURE — 91300 COVID-19, MRNA, LNP-S, PF, 30 MCG/0.3 ML DOSE VACCINE: CPT | Mod: PBBFAC | Performed by: FAMILY MEDICINE

## 2021-03-31 ENCOUNTER — PATIENT OUTREACH (OUTPATIENT)
Dept: ADMINISTRATIVE | Facility: HOSPITAL | Age: 66
End: 2021-03-31

## 2021-04-01 ENCOUNTER — TELEPHONE (OUTPATIENT)
Dept: INTERNAL MEDICINE | Facility: CLINIC | Age: 66
End: 2021-04-01

## 2021-04-05 ENCOUNTER — TELEPHONE (OUTPATIENT)
Dept: ORTHOPEDICS | Facility: CLINIC | Age: 66
End: 2021-04-05

## 2021-04-05 ENCOUNTER — OFFICE VISIT (OUTPATIENT)
Dept: ORTHOPEDICS | Facility: CLINIC | Age: 66
End: 2021-04-05
Payer: COMMERCIAL

## 2021-04-05 ENCOUNTER — HOSPITAL ENCOUNTER (OUTPATIENT)
Dept: RADIOLOGY | Facility: HOSPITAL | Age: 66
Discharge: HOME | End: 2021-04-05
Attending: PHYSICIAN ASSISTANT
Payer: COMMERCIAL

## 2021-04-05 ENCOUNTER — OFFICE VISIT (OUTPATIENT)
Dept: URGENT CARE | Facility: CLINIC | Age: 66
End: 2021-04-05
Payer: COMMERCIAL

## 2021-04-05 VITALS
TEMPERATURE: 98 F | SYSTOLIC BLOOD PRESSURE: 120 MMHG | BODY MASS INDEX: 46.44 KG/M2 | OXYGEN SATURATION: 96 % | DIASTOLIC BLOOD PRESSURE: 68 MMHG | HEART RATE: 110 BPM | WEIGHT: 296.5 LBS

## 2021-04-05 VITALS — WEIGHT: 296 LBS | HEIGHT: 67 IN | BODY MASS INDEX: 46.46 KG/M2

## 2021-04-05 DIAGNOSIS — L97.919 ULCERS OF BOTH LOWER LEGS: ICD-10-CM

## 2021-04-05 DIAGNOSIS — M25.551 RIGHT HIP PAIN: Primary | ICD-10-CM

## 2021-04-05 DIAGNOSIS — M79.651 RIGHT THIGH PAIN: ICD-10-CM

## 2021-04-05 DIAGNOSIS — L97.929 ULCERS OF BOTH LOWER LEGS: ICD-10-CM

## 2021-04-05 DIAGNOSIS — S76.111A QUADRICEPS MUSCLE STRAIN, RIGHT, INITIAL ENCOUNTER: ICD-10-CM
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
## 2021-04-05 DIAGNOSIS — M25.551 RIGHT HIP PAIN: ICD-10-CM

## 2021-04-05 PROCEDURE — 3078F DIAST BP <80 MM HG: CPT | Mod: CPTII,S$GLB,, | Performed by: PHYSICIAN ASSISTANT

## 2021-04-05 PROCEDURE — 1125F PR PAIN SEVERITY QUANTIFIED, PAIN PRESENT: ICD-10-PCS | Mod: S$GLB,,, | Performed by: PHYSICAL MEDICINE & REHABILITATION

## 2021-04-05 PROCEDURE — 1125F AMNT PAIN NOTED PAIN PRSNT: CPT | Mod: S$GLB,,, | Performed by: PHYSICIAN ASSISTANT

## 2021-04-05 PROCEDURE — 99203 PR OFFICE/OUTPT VISIT, NEW, LEVL III, 30-44 MIN: ICD-10-PCS | Mod: S$GLB,,, | Performed by: PHYSICAL MEDICINE & REHABILITATION

## 2021-04-05 PROCEDURE — 3008F PR BODY MASS INDEX (BMI) DOCUMENTED: ICD-10-PCS | Mod: CPTII,S$GLB,, | Performed by: PHYSICAL MEDICINE & REHABILITATION

## 2021-04-05 PROCEDURE — 3008F PR BODY MASS INDEX (BMI) DOCUMENTED: ICD-10-PCS | Mod: CPTII,S$GLB,, | Performed by: PHYSICIAN ASSISTANT

## 2021-04-05 PROCEDURE — 3008F BODY MASS INDEX DOCD: CPT | Mod: CPTII,S$GLB,, | Performed by: PHYSICAL MEDICINE & REHABILITATION

## 2021-04-05 PROCEDURE — 1159F PR MEDICATION LIST DOCUMENTED IN MEDICAL RECORD: ICD-10-PCS | Mod: S$GLB,,, | Performed by: PHYSICIAN ASSISTANT

## 2021-04-05 PROCEDURE — 1125F PR PAIN SEVERITY QUANTIFIED, PAIN PRESENT: ICD-10-PCS | Mod: S$GLB,,, | Performed by: PHYSICIAN ASSISTANT

## 2021-04-05 PROCEDURE — 99999 PR PBB SHADOW E&M-EST. PATIENT-LVL III: ICD-10-PCS | Mod: PBBFAC,,, | Performed by: PHYSICAL MEDICINE & REHABILITATION

## 2021-04-05 PROCEDURE — 73502 XR HIP 2 VIEW RIGHT: ICD-10-PCS | Mod: 26,RT,, | Performed by: RADIOLOGY

## 2021-04-05 PROCEDURE — 1159F MED LIST DOCD IN RCRD: CPT | Mod: S$GLB,,, | Performed by: PHYSICIAN ASSISTANT

## 2021-04-05 PROCEDURE — 99214 PR OFFICE/OUTPT VISIT, EST, LEVL IV, 30-39 MIN: ICD-10-PCS | Mod: S$GLB,,, | Performed by: PHYSICIAN ASSISTANT

## 2021-04-05 PROCEDURE — 73502 X-RAY EXAM HIP UNI 2-3 VIEWS: CPT | Mod: 26,RT,, | Performed by: RADIOLOGY

## 2021-04-05 PROCEDURE — 3288F FALL RISK ASSESSMENT DOCD: CPT | Mod: CPTII,S$GLB,, | Performed by: PHYSICIAN ASSISTANT

## 2021-04-05 PROCEDURE — 3008F BODY MASS INDEX DOCD: CPT | Mod: CPTII,S$GLB,, | Performed by: PHYSICIAN ASSISTANT

## 2021-04-05 PROCEDURE — 3078F PR MOST RECENT DIASTOLIC BLOOD PRESSURE < 80 MM HG: ICD-10-PCS | Mod: CPTII,S$GLB,, | Performed by: PHYSICIAN ASSISTANT

## 2021-04-05 PROCEDURE — 1101F PR PT FALLS ASSESS DOC 0-1 FALLS W/OUT INJ PAST YR: ICD-10-PCS | Mod: CPTII,S$GLB,, | Performed by: PHYSICAL MEDICINE & REHABILITATION

## 2021-04-05 PROCEDURE — 3074F PR MOST RECENT SYSTOLIC BLOOD PRESSURE < 130 MM HG: ICD-10-PCS | Mod: CPTII,S$GLB,, | Performed by: PHYSICAL MEDICINE & REHABILITATION

## 2021-04-05 PROCEDURE — 1101F PR PT FALLS ASSESS DOC 0-1 FALLS W/OUT INJ PAST YR: ICD-10-PCS | Mod: CPTII,S$GLB,, | Performed by: PHYSICIAN ASSISTANT

## 2021-04-05 PROCEDURE — 1159F PR MEDICATION LIST DOCUMENTED IN MEDICAL RECORD: ICD-10-PCS | Mod: S$GLB,,, | Performed by: PHYSICAL MEDICINE & REHABILITATION

## 2021-04-05 PROCEDURE — 3288F PR FALLS RISK ASSESSMENT DOCUMENTED: ICD-10-PCS | Mod: CPTII,S$GLB,, | Performed by: PHYSICAL MEDICINE & REHABILITATION

## 2021-04-05 PROCEDURE — 73502 X-RAY EXAM HIP UNI 2-3 VIEWS: CPT | Mod: TC,PO,RT

## 2021-04-05 PROCEDURE — 3288F FALL RISK ASSESSMENT DOCD: CPT | Mod: CPTII,S$GLB,, | Performed by: PHYSICAL MEDICINE & REHABILITATION

## 2021-04-05 PROCEDURE — 3074F PR MOST RECENT SYSTOLIC BLOOD PRESSURE < 130 MM HG: ICD-10-PCS | Mod: CPTII,S$GLB,, | Performed by: PHYSICIAN ASSISTANT

## 2021-04-05 PROCEDURE — 1101F PT FALLS ASSESS-DOCD LE1/YR: CPT | Mod: CPTII,S$GLB,, | Performed by: PHYSICIAN ASSISTANT

## 2021-04-05 PROCEDURE — 3078F DIAST BP <80 MM HG: CPT | Mod: CPTII,S$GLB,, | Performed by: PHYSICAL MEDICINE & REHABILITATION

## 2021-04-05 PROCEDURE — 99999 PR PBB SHADOW E&M-EST. PATIENT-LVL III: CPT | Mod: PBBFAC,,, | Performed by: PHYSICIAN ASSISTANT

## 2021-04-05 PROCEDURE — 3074F SYST BP LT 130 MM HG: CPT | Mod: CPTII,S$GLB,, | Performed by: PHYSICAL MEDICINE & REHABILITATION

## 2021-04-05 PROCEDURE — 99203 OFFICE O/P NEW LOW 30 MIN: CPT | Mod: S$GLB,,, | Performed by: PHYSICAL MEDICINE & REHABILITATION

## 2021-04-05 PROCEDURE — 1159F MED LIST DOCD IN RCRD: CPT | Mod: S$GLB,,, | Performed by: PHYSICAL MEDICINE & REHABILITATION

## 2021-04-05 PROCEDURE — 3078F PR MOST RECENT DIASTOLIC BLOOD PRESSURE < 80 MM HG: ICD-10-PCS | Mod: CPTII,S$GLB,, | Performed by: PHYSICAL MEDICINE & REHABILITATION

## 2021-04-05 PROCEDURE — 3288F PR FALLS RISK ASSESSMENT DOCUMENTED: ICD-10-PCS | Mod: CPTII,S$GLB,, | Performed by: PHYSICIAN ASSISTANT

## 2021-04-05 PROCEDURE — 99214 OFFICE O/P EST MOD 30 MIN: CPT | Mod: S$GLB,,, | Performed by: PHYSICIAN ASSISTANT

## 2021-04-05 PROCEDURE — 1125F AMNT PAIN NOTED PAIN PRSNT: CPT | Mod: S$GLB,,, | Performed by: PHYSICAL MEDICINE & REHABILITATION

## 2021-04-05 PROCEDURE — 99999 PR PBB SHADOW E&M-EST. PATIENT-LVL III: ICD-10-PCS | Mod: PBBFAC,,, | Performed by: PHYSICIAN ASSISTANT

## 2021-04-05 PROCEDURE — 99999 PR PBB SHADOW E&M-EST. PATIENT-LVL III: CPT | Mod: PBBFAC,,, | Performed by: PHYSICAL MEDICINE & REHABILITATION

## 2021-04-05 PROCEDURE — 3074F SYST BP LT 130 MM HG: CPT | Mod: CPTII,S$GLB,, | Performed by: PHYSICIAN ASSISTANT

## 2021-04-05 PROCEDURE — 1101F PT FALLS ASSESS-DOCD LE1/YR: CPT | Mod: CPTII,S$GLB,, | Performed by: PHYSICAL MEDICINE & REHABILITATION

## 2021-04-05 RX ORDER — ROSUVASTATIN CALCIUM 40 MG/1
40 TABLET, COATED ORAL DAILY
Qty: 90 TABLET | Refills: 1 | Status: SHIPPED | OUTPATIENT
Start: 2021-04-05 | End: 2021-10-02 | Stop reason: SDUPTHER

## 2021-04-06 ENCOUNTER — OFFICE VISIT (OUTPATIENT)
Dept: NEPHROLOGY | Facility: CLINIC | Age: 66
End: 2021-04-06
Payer: COMMERCIAL

## 2021-04-06 ENCOUNTER — OFFICE VISIT (OUTPATIENT)
Dept: DERMATOLOGY | Facility: CLINIC | Age: 66
End: 2021-04-06
Payer: COMMERCIAL

## 2021-04-06 ENCOUNTER — PATIENT OUTREACH (OUTPATIENT)
Dept: ADMINISTRATIVE | Facility: OTHER | Age: 66
End: 2021-04-06

## 2021-04-06 VITALS
BODY MASS INDEX: 46.47 KG/M2 | DIASTOLIC BLOOD PRESSURE: 60 MMHG | WEIGHT: 296.06 LBS | RESPIRATION RATE: 16 BRPM | SYSTOLIC BLOOD PRESSURE: 110 MMHG | HEART RATE: 88 BPM | HEIGHT: 67 IN

## 2021-04-06 DIAGNOSIS — R23.8 BLISTERS OF MULTIPLE SITES: ICD-10-CM

## 2021-04-06 DIAGNOSIS — R60.9 EDEMA, UNSPECIFIED TYPE: Primary | ICD-10-CM

## 2021-04-06 DIAGNOSIS — N25.81 SECONDARY HYPERPARATHYROIDISM OF RENAL ORIGIN: ICD-10-CM

## 2021-04-06 DIAGNOSIS — I10 HTN (HYPERTENSION), BENIGN: ICD-10-CM

## 2021-04-06 DIAGNOSIS — Z94.0 S/P KIDNEY TRANSPLANT: Primary | ICD-10-CM

## 2021-04-06 PROCEDURE — 3078F PR MOST RECENT DIASTOLIC BLOOD PRESSURE < 80 MM HG: ICD-10-PCS | Mod: CPTII,S$GLB,, | Performed by: INTERNAL MEDICINE

## 2021-04-06 PROCEDURE — 1101F PT FALLS ASSESS-DOCD LE1/YR: CPT | Mod: CPTII,S$GLB,, | Performed by: STUDENT IN AN ORGANIZED HEALTH CARE EDUCATION/TRAINING PROGRAM

## 2021-04-06 PROCEDURE — 1126F PR PAIN SEVERITY QUANTIFIED, NO PAIN PRESENT: ICD-10-PCS | Mod: S$GLB,,, | Performed by: STUDENT IN AN ORGANIZED HEALTH CARE EDUCATION/TRAINING PROGRAM

## 2021-04-06 PROCEDURE — 3074F SYST BP LT 130 MM HG: CPT | Mod: CPTII,S$GLB,, | Performed by: INTERNAL MEDICINE

## 2021-04-06 PROCEDURE — 3078F DIAST BP <80 MM HG: CPT | Mod: CPTII,S$GLB,, | Performed by: STUDENT IN AN ORGANIZED HEALTH CARE EDUCATION/TRAINING PROGRAM

## 2021-04-06 PROCEDURE — 99999 PR PBB SHADOW E&M-EST. PATIENT-LVL III: CPT | Mod: PBBFAC,,, | Performed by: STUDENT IN AN ORGANIZED HEALTH CARE EDUCATION/TRAINING PROGRAM

## 2021-04-06 PROCEDURE — 99999 PR PBB SHADOW E&M-EST. PATIENT-LVL III: ICD-10-PCS | Mod: PBBFAC,,, | Performed by: STUDENT IN AN ORGANIZED HEALTH CARE EDUCATION/TRAINING PROGRAM

## 2021-04-06 PROCEDURE — 99215 OFFICE O/P EST HI 40 MIN: CPT | Mod: S$GLB,,, | Performed by: INTERNAL MEDICINE

## 2021-04-06 PROCEDURE — 1159F MED LIST DOCD IN RCRD: CPT | Mod: S$GLB,,, | Performed by: INTERNAL MEDICINE

## 2021-04-06 PROCEDURE — 3078F PR MOST RECENT DIASTOLIC BLOOD PRESSURE < 80 MM HG: ICD-10-PCS | Mod: CPTII,S$GLB,, | Performed by: STUDENT IN AN ORGANIZED HEALTH CARE EDUCATION/TRAINING PROGRAM

## 2021-04-06 PROCEDURE — 1159F PR MEDICATION LIST DOCUMENTED IN MEDICAL RECORD: ICD-10-PCS | Mod: S$GLB,,, | Performed by: INTERNAL MEDICINE

## 2021-04-06 PROCEDURE — 3288F FALL RISK ASSESSMENT DOCD: CPT | Mod: CPTII,S$GLB,, | Performed by: STUDENT IN AN ORGANIZED HEALTH CARE EDUCATION/TRAINING PROGRAM

## 2021-04-06 PROCEDURE — 99202 OFFICE O/P NEW SF 15 MIN: CPT | Mod: S$GLB,,, | Performed by: STUDENT IN AN ORGANIZED HEALTH CARE EDUCATION/TRAINING PROGRAM

## 2021-04-06 PROCEDURE — 1101F PR PT FALLS ASSESS DOC 0-1 FALLS W/OUT INJ PAST YR: ICD-10-PCS | Mod: CPTII,S$GLB,, | Performed by: STUDENT IN AN ORGANIZED HEALTH CARE EDUCATION/TRAINING PROGRAM

## 2021-04-06 PROCEDURE — 99202 PR OFFICE/OUTPT VISIT, NEW, LEVL II, 15-29 MIN: ICD-10-PCS | Mod: S$GLB,,, | Performed by: STUDENT IN AN ORGANIZED HEALTH CARE EDUCATION/TRAINING PROGRAM

## 2021-04-06 PROCEDURE — 99999 PR PBB SHADOW E&M-EST. PATIENT-LVL III: ICD-10-PCS | Mod: PBBFAC,,, | Performed by: INTERNAL MEDICINE

## 2021-04-06 PROCEDURE — 3288F PR FALLS RISK ASSESSMENT DOCUMENTED: ICD-10-PCS | Mod: CPTII,S$GLB,, | Performed by: INTERNAL MEDICINE

## 2021-04-06 PROCEDURE — 3288F FALL RISK ASSESSMENT DOCD: CPT | Mod: CPTII,S$GLB,, | Performed by: INTERNAL MEDICINE

## 2021-04-06 PROCEDURE — 1125F PR PAIN SEVERITY QUANTIFIED, PAIN PRESENT: ICD-10-PCS | Mod: S$GLB,,, | Performed by: INTERNAL MEDICINE

## 2021-04-06 PROCEDURE — 3008F PR BODY MASS INDEX (BMI) DOCUMENTED: ICD-10-PCS | Mod: CPTII,S$GLB,, | Performed by: INTERNAL MEDICINE

## 2021-04-06 PROCEDURE — 1159F MED LIST DOCD IN RCRD: CPT | Mod: S$GLB,,, | Performed by: STUDENT IN AN ORGANIZED HEALTH CARE EDUCATION/TRAINING PROGRAM

## 2021-04-06 PROCEDURE — 1126F AMNT PAIN NOTED NONE PRSNT: CPT | Mod: S$GLB,,, | Performed by: STUDENT IN AN ORGANIZED HEALTH CARE EDUCATION/TRAINING PROGRAM

## 2021-04-06 PROCEDURE — 3074F PR MOST RECENT SYSTOLIC BLOOD PRESSURE < 130 MM HG: ICD-10-PCS | Mod: CPTII,S$GLB,, | Performed by: INTERNAL MEDICINE

## 2021-04-06 PROCEDURE — 1101F PR PT FALLS ASSESS DOC 0-1 FALLS W/OUT INJ PAST YR: ICD-10-PCS | Mod: CPTII,S$GLB,, | Performed by: INTERNAL MEDICINE

## 2021-04-06 PROCEDURE — 99999 PR PBB SHADOW E&M-EST. PATIENT-LVL III: CPT | Mod: PBBFAC,,, | Performed by: INTERNAL MEDICINE

## 2021-04-06 PROCEDURE — 99215 PR OFFICE/OUTPT VISIT, EST, LEVL V, 40-54 MIN: ICD-10-PCS | Mod: S$GLB,,, | Performed by: INTERNAL MEDICINE

## 2021-04-06 PROCEDURE — 1159F PR MEDICATION LIST DOCUMENTED IN MEDICAL RECORD: ICD-10-PCS | Mod: S$GLB,,, | Performed by: STUDENT IN AN ORGANIZED HEALTH CARE EDUCATION/TRAINING PROGRAM

## 2021-04-06 PROCEDURE — 1125F AMNT PAIN NOTED PAIN PRSNT: CPT | Mod: S$GLB,,, | Performed by: INTERNAL MEDICINE

## 2021-04-06 PROCEDURE — 3288F PR FALLS RISK ASSESSMENT DOCUMENTED: ICD-10-PCS | Mod: CPTII,S$GLB,, | Performed by: STUDENT IN AN ORGANIZED HEALTH CARE EDUCATION/TRAINING PROGRAM

## 2021-04-06 PROCEDURE — 3074F PR MOST RECENT SYSTOLIC BLOOD PRESSURE < 130 MM HG: ICD-10-PCS | Mod: CPTII,S$GLB,, | Performed by: STUDENT IN AN ORGANIZED HEALTH CARE EDUCATION/TRAINING PROGRAM

## 2021-04-06 PROCEDURE — 3078F DIAST BP <80 MM HG: CPT | Mod: CPTII,S$GLB,, | Performed by: INTERNAL MEDICINE

## 2021-04-06 PROCEDURE — 3008F BODY MASS INDEX DOCD: CPT | Mod: CPTII,S$GLB,, | Performed by: INTERNAL MEDICINE

## 2021-04-06 PROCEDURE — 1101F PT FALLS ASSESS-DOCD LE1/YR: CPT | Mod: CPTII,S$GLB,, | Performed by: INTERNAL MEDICINE

## 2021-04-06 PROCEDURE — 3074F SYST BP LT 130 MM HG: CPT | Mod: CPTII,S$GLB,, | Performed by: STUDENT IN AN ORGANIZED HEALTH CARE EDUCATION/TRAINING PROGRAM

## 2021-04-06 RX ORDER — MUPIROCIN 20 MG/G
OINTMENT TOPICAL 2 TIMES DAILY
Qty: 22 G | Refills: 1 | Status: SHIPPED | OUTPATIENT
Start: 2021-04-06 | End: 2021-05-17 | Stop reason: SDUPTHER

## 2021-04-27 ENCOUNTER — TELEPHONE (OUTPATIENT)
Dept: NEPHROLOGY | Facility: CLINIC | Age: 66
End: 2021-04-27

## 2021-04-27 ENCOUNTER — LAB VISIT (OUTPATIENT)
Dept: LAB | Facility: HOSPITAL | Age: 66
End: 2021-04-27
Attending: INTERNAL MEDICINE
Payer: COMMERCIAL

## 2021-04-27 ENCOUNTER — PATIENT MESSAGE (OUTPATIENT)
Dept: NEPHROLOGY | Facility: CLINIC | Age: 66
End: 2021-04-27

## 2021-04-27 DIAGNOSIS — I10 HTN (HYPERTENSION), BENIGN: ICD-10-CM

## 2021-04-27 DIAGNOSIS — N25.81 SECONDARY HYPERPARATHYROIDISM OF RENAL ORIGIN: ICD-10-CM

## 2021-04-27 DIAGNOSIS — Z94.0 S/P KIDNEY TRANSPLANT: ICD-10-CM

## 2021-04-27 LAB
ALBUMIN SERPL BCP-MCNC: 3.3 G/DL (ref 3.5–5.2)
ANION GAP SERPL CALC-SCNC: 8 MMOL/L (ref 8–16)
BASOPHILS # BLD AUTO: 0.02 K/UL (ref 0–0.2)
BASOPHILS NFR BLD: 0.3 % (ref 0–1.9)
BUN SERPL-MCNC: 18 MG/DL (ref 8–23)
CALCIUM SERPL-MCNC: 8.6 MG/DL (ref 8.7–10.5)
CHLORIDE SERPL-SCNC: 106 MMOL/L (ref 95–110)
CO2 SERPL-SCNC: 30 MMOL/L (ref 23–29)
CREAT SERPL-MCNC: 1.5 MG/DL (ref 0.5–1.4)
DIFFERENTIAL METHOD: ABNORMAL
EOSINOPHIL # BLD AUTO: 0 K/UL (ref 0–0.5)
EOSINOPHIL NFR BLD: 0.5 % (ref 0–8)
ERYTHROCYTE [DISTWIDTH] IN BLOOD BY AUTOMATED COUNT: 13.6 % (ref 11.5–14.5)
EST. GFR  (AFRICAN AMERICAN): 55 ML/MIN/1.73 M^2
EST. GFR  (NON AFRICAN AMERICAN): 47 ML/MIN/1.73 M^2
GLUCOSE SERPL-MCNC: 46 MG/DL (ref 70–110)
HCT VFR BLD AUTO: 42.7 % (ref 40–54)
HGB BLD-MCNC: 12.4 G/DL (ref 14–18)
IMM GRANULOCYTES # BLD AUTO: 0.06 K/UL (ref 0–0.04)
IMM GRANULOCYTES NFR BLD AUTO: 0.8 % (ref 0–0.5)
LYMPHOCYTES # BLD AUTO: 1 K/UL (ref 1–4.8)
LYMPHOCYTES NFR BLD: 12.8 % (ref 18–48)
MAGNESIUM SERPL-MCNC: 1.7 MG/DL (ref 1.6–2.6)
MCH RBC QN AUTO: 25.3 PG (ref 27–31)
MCHC RBC AUTO-ENTMCNC: 29 G/DL (ref 32–36)
MCV RBC AUTO: 87 FL (ref 82–98)
MONOCYTES # BLD AUTO: 0.7 K/UL (ref 0.3–1)
MONOCYTES NFR BLD: 8.9 % (ref 4–15)
NEUTROPHILS # BLD AUTO: 5.9 K/UL (ref 1.8–7.7)
NEUTROPHILS NFR BLD: 76.7 % (ref 38–73)
NRBC BLD-RTO: 0 /100 WBC
PHOSPHATE SERPL-MCNC: 2.9 MG/DL (ref 2.7–4.5)
PLATELET # BLD AUTO: 194 K/UL (ref 150–450)
PMV BLD AUTO: 11.2 FL (ref 9.2–12.9)
POTASSIUM SERPL-SCNC: 3.8 MMOL/L (ref 3.5–5.1)
RBC # BLD AUTO: 4.91 M/UL (ref 4.6–6.2)
SODIUM SERPL-SCNC: 144 MMOL/L (ref 136–145)
WBC # BLD AUTO: 7.74 K/UL (ref 3.9–12.7)

## 2021-04-27 PROCEDURE — 83970 ASSAY OF PARATHORMONE: CPT | Performed by: INTERNAL MEDICINE

## 2021-04-27 PROCEDURE — 80069 RENAL FUNCTION PANEL: CPT | Performed by: INTERNAL MEDICINE

## 2021-04-27 PROCEDURE — 36415 COLL VENOUS BLD VENIPUNCTURE: CPT | Mod: PO | Performed by: INTERNAL MEDICINE

## 2021-04-27 PROCEDURE — 82306 VITAMIN D 25 HYDROXY: CPT | Performed by: INTERNAL MEDICINE

## 2021-04-27 PROCEDURE — 83735 ASSAY OF MAGNESIUM: CPT | Performed by: INTERNAL MEDICINE

## 2021-04-27 PROCEDURE — 80197 ASSAY OF TACROLIMUS: CPT | Performed by: INTERNAL MEDICINE

## 2021-04-27 PROCEDURE — 85025 COMPLETE CBC W/AUTO DIFF WBC: CPT | Performed by: INTERNAL MEDICINE

## 2021-04-28 LAB
25(OH)D3+25(OH)D2 SERPL-MCNC: 28 NG/ML (ref 30–96)
PTH-INTACT SERPL-MCNC: 356 PG/ML (ref 9–77)
TACROLIMUS BLD-MCNC: 6.8 NG/ML (ref 5–15)

## 2021-05-04 ENCOUNTER — TELEPHONE (OUTPATIENT)
Dept: NEPHROLOGY | Facility: CLINIC | Age: 66
End: 2021-05-04

## 2021-05-05 ENCOUNTER — TELEPHONE (OUTPATIENT)
Dept: NEPHROLOGY | Facility: CLINIC | Age: 66
End: 2021-05-05

## 2021-05-12 ENCOUNTER — OFFICE VISIT (OUTPATIENT)
Dept: INTERNAL MEDICINE | Facility: CLINIC | Age: 66
End: 2021-05-12
Payer: COMMERCIAL

## 2021-05-12 VITALS
SYSTOLIC BLOOD PRESSURE: 118 MMHG | BODY MASS INDEX: 46.37 KG/M2 | OXYGEN SATURATION: 95 % | HEIGHT: 67 IN | HEART RATE: 103 BPM | DIASTOLIC BLOOD PRESSURE: 70 MMHG | TEMPERATURE: 98 F | WEIGHT: 295.44 LBS

## 2021-05-12 DIAGNOSIS — I12.9 HYPERTENSION ASSOCIATED WITH STAGE 3 CHRONIC KIDNEY DISEASE DUE TO TYPE 2 DIABETES MELLITUS: Primary | ICD-10-CM

## 2021-05-12 DIAGNOSIS — I87.2 CHRONIC VENOUS INSUFFICIENCY OF LOWER EXTREMITY: ICD-10-CM

## 2021-05-12 DIAGNOSIS — N18.30 HYPERTENSION ASSOCIATED WITH STAGE 3 CHRONIC KIDNEY DISEASE DUE TO TYPE 2 DIABETES MELLITUS: Primary | ICD-10-CM

## 2021-05-12 DIAGNOSIS — N18.31 STAGE 3A CHRONIC KIDNEY DISEASE: ICD-10-CM

## 2021-05-12 DIAGNOSIS — E11.22 HYPERTENSION ASSOCIATED WITH STAGE 3 CHRONIC KIDNEY DISEASE DUE TO TYPE 2 DIABETES MELLITUS: Primary | ICD-10-CM

## 2021-05-12 PROCEDURE — 3074F SYST BP LT 130 MM HG: CPT | Mod: CPTII,S$GLB,, | Performed by: INTERNAL MEDICINE

## 2021-05-12 PROCEDURE — 99213 PR OFFICE/OUTPT VISIT, EST, LEVL III, 20-29 MIN: ICD-10-PCS | Mod: S$GLB,,, | Performed by: INTERNAL MEDICINE

## 2021-05-12 PROCEDURE — 3008F PR BODY MASS INDEX (BMI) DOCUMENTED: ICD-10-PCS | Mod: CPTII,S$GLB,, | Performed by: INTERNAL MEDICINE

## 2021-05-12 PROCEDURE — 99999 PR PBB SHADOW E&M-EST. PATIENT-LVL V: CPT | Mod: PBBFAC,,, | Performed by: INTERNAL MEDICINE

## 2021-05-12 PROCEDURE — 3074F PR MOST RECENT SYSTOLIC BLOOD PRESSURE < 130 MM HG: ICD-10-PCS | Mod: CPTII,S$GLB,, | Performed by: INTERNAL MEDICINE

## 2021-05-12 PROCEDURE — 1125F AMNT PAIN NOTED PAIN PRSNT: CPT | Mod: S$GLB,,, | Performed by: INTERNAL MEDICINE

## 2021-05-12 PROCEDURE — 3044F PR MOST RECENT HEMOGLOBIN A1C LEVEL <7.0%: ICD-10-PCS | Mod: CPTII,S$GLB,, | Performed by: INTERNAL MEDICINE

## 2021-05-12 PROCEDURE — 3078F PR MOST RECENT DIASTOLIC BLOOD PRESSURE < 80 MM HG: ICD-10-PCS | Mod: CPTII,S$GLB,, | Performed by: INTERNAL MEDICINE

## 2021-05-12 PROCEDURE — 3008F BODY MASS INDEX DOCD: CPT | Mod: CPTII,S$GLB,, | Performed by: INTERNAL MEDICINE

## 2021-05-12 PROCEDURE — 1159F MED LIST DOCD IN RCRD: CPT | Mod: S$GLB,,, | Performed by: INTERNAL MEDICINE

## 2021-05-12 PROCEDURE — 3078F DIAST BP <80 MM HG: CPT | Mod: CPTII,S$GLB,, | Performed by: INTERNAL MEDICINE

## 2021-05-12 PROCEDURE — 3044F HG A1C LEVEL LT 7.0%: CPT | Mod: CPTII,S$GLB,, | Performed by: INTERNAL MEDICINE

## 2021-05-12 PROCEDURE — 99999 PR PBB SHADOW E&M-EST. PATIENT-LVL V: ICD-10-PCS | Mod: PBBFAC,,, | Performed by: INTERNAL MEDICINE

## 2021-05-12 PROCEDURE — 1125F PR PAIN SEVERITY QUANTIFIED, PAIN PRESENT: ICD-10-PCS | Mod: S$GLB,,, | Performed by: INTERNAL MEDICINE

## 2021-05-12 PROCEDURE — 99213 OFFICE O/P EST LOW 20 MIN: CPT | Mod: S$GLB,,, | Performed by: INTERNAL MEDICINE

## 2021-05-12 PROCEDURE — 1159F PR MEDICATION LIST DOCUMENTED IN MEDICAL RECORD: ICD-10-PCS | Mod: S$GLB,,, | Performed by: INTERNAL MEDICINE

## 2021-05-12 RX ORDER — LANCING DEVICE
1 EACH MISCELLANEOUS
Qty: 1 EACH | Refills: 0 | Status: SHIPPED | OUTPATIENT
Start: 2021-05-12 | End: 2022-02-25 | Stop reason: ALTCHOICE

## 2021-05-17 ENCOUNTER — PATIENT OUTREACH (OUTPATIENT)
Dept: ADMINISTRATIVE | Facility: OTHER | Age: 66
End: 2021-05-17

## 2021-05-18 RX ORDER — MUPIROCIN 20 MG/G
OINTMENT TOPICAL 2 TIMES DAILY
Qty: 22 G | Refills: 1 | Status: SHIPPED | OUTPATIENT
Start: 2021-05-18 | End: 2021-12-06 | Stop reason: ALTCHOICE

## 2021-05-19 ENCOUNTER — TELEPHONE (OUTPATIENT)
Dept: TRANSPLANT | Facility: CLINIC | Age: 66
End: 2021-05-19

## 2021-05-19 DIAGNOSIS — E55.9 VITAMIN D DEFICIENCY: ICD-10-CM

## 2021-05-19 DIAGNOSIS — Z94.0 KIDNEY REPLACED BY TRANSPLANT: Primary | ICD-10-CM

## 2021-06-02 RX ORDER — GLIMEPIRIDE 4 MG/1
4 TABLET ORAL
Qty: 90 TABLET | Refills: 0 | Status: SHIPPED | OUTPATIENT
Start: 2021-06-02 | End: 2021-08-31 | Stop reason: SDUPTHER

## 2021-06-04 ENCOUNTER — LAB VISIT (OUTPATIENT)
Dept: LAB | Facility: HOSPITAL | Age: 66
End: 2021-06-04
Attending: INTERNAL MEDICINE
Payer: COMMERCIAL

## 2021-06-04 DIAGNOSIS — Z94.0 KIDNEY REPLACED BY TRANSPLANT: ICD-10-CM

## 2021-06-04 DIAGNOSIS — E55.9 VITAMIN D DEFICIENCY: ICD-10-CM

## 2021-06-04 DIAGNOSIS — E11.8 DM (DIABETES MELLITUS), TYPE 2 WITH COMPLICATIONS: ICD-10-CM

## 2021-06-04 LAB
25(OH)D3+25(OH)D2 SERPL-MCNC: 29 NG/ML (ref 30–96)
ALBUMIN SERPL BCP-MCNC: 3.4 G/DL (ref 3.5–5.2)
ALP SERPL-CCNC: 63 U/L (ref 55–135)
ALP SERPL-CCNC: 63 U/L (ref 55–135)
ALT SERPL W/O P-5'-P-CCNC: 16 U/L (ref 10–44)
ALT SERPL W/O P-5'-P-CCNC: 16 U/L (ref 10–44)
ANION GAP SERPL CALC-SCNC: 8 MMOL/L (ref 8–16)
ANION GAP SERPL CALC-SCNC: 8 MMOL/L (ref 8–16)
AST SERPL-CCNC: 15 U/L (ref 10–40)
AST SERPL-CCNC: 15 U/L (ref 10–40)
BASOPHILS # BLD AUTO: 0.03 K/UL (ref 0–0.2)
BASOPHILS # BLD AUTO: 0.03 K/UL (ref 0–0.2)
BASOPHILS NFR BLD: 0.5 % (ref 0–1.9)
BASOPHILS NFR BLD: 0.5 % (ref 0–1.9)
BILIRUB DIRECT SERPL-MCNC: 0.2 MG/DL (ref 0.1–0.3)
BILIRUB SERPL-MCNC: 0.5 MG/DL (ref 0.1–1)
BILIRUB SERPL-MCNC: 0.5 MG/DL (ref 0.1–1)
BUN SERPL-MCNC: 21 MG/DL (ref 8–23)
BUN SERPL-MCNC: 21 MG/DL (ref 8–23)
CALCIUM SERPL-MCNC: 8.9 MG/DL (ref 8.7–10.5)
CALCIUM SERPL-MCNC: 8.9 MG/DL (ref 8.7–10.5)
CHLORIDE SERPL-SCNC: 105 MMOL/L (ref 95–110)
CHLORIDE SERPL-SCNC: 105 MMOL/L (ref 95–110)
CHOLEST SERPL-MCNC: 165 MG/DL (ref 120–199)
CHOLEST/HDLC SERPL: 2.8 {RATIO} (ref 2–5)
CO2 SERPL-SCNC: 32 MMOL/L (ref 23–29)
CO2 SERPL-SCNC: 32 MMOL/L (ref 23–29)
CREAT SERPL-MCNC: 1.5 MG/DL (ref 0.5–1.4)
CREAT SERPL-MCNC: 1.5 MG/DL (ref 0.5–1.4)
DIFFERENTIAL METHOD: ABNORMAL
DIFFERENTIAL METHOD: ABNORMAL
EOSINOPHIL # BLD AUTO: 0 K/UL (ref 0–0.5)
EOSINOPHIL # BLD AUTO: 0 K/UL (ref 0–0.5)
EOSINOPHIL NFR BLD: 0.3 % (ref 0–8)
EOSINOPHIL NFR BLD: 0.3 % (ref 0–8)
ERYTHROCYTE [DISTWIDTH] IN BLOOD BY AUTOMATED COUNT: 14.1 % (ref 11.5–14.5)
ERYTHROCYTE [DISTWIDTH] IN BLOOD BY AUTOMATED COUNT: 14.1 % (ref 11.5–14.5)
EST. GFR  (AFRICAN AMERICAN): 54.9 ML/MIN/1.73 M^2
EST. GFR  (AFRICAN AMERICAN): 54.9 ML/MIN/1.73 M^2
EST. GFR  (NON AFRICAN AMERICAN): 47.5 ML/MIN/1.73 M^2
EST. GFR  (NON AFRICAN AMERICAN): 47.5 ML/MIN/1.73 M^2
ESTIMATED AVG GLUCOSE: 114 MG/DL (ref 68–131)
GLUCOSE SERPL-MCNC: 53 MG/DL (ref 70–110)
GLUCOSE SERPL-MCNC: 53 MG/DL (ref 70–110)
HBA1C MFR BLD: 5.6 % (ref 4–5.6)
HCT VFR BLD AUTO: 43.9 % (ref 40–54)
HCT VFR BLD AUTO: 43.9 % (ref 40–54)
HDLC SERPL-MCNC: 58 MG/DL (ref 40–75)
HDLC SERPL: 35.2 % (ref 20–50)
HGB BLD-MCNC: 12.2 G/DL (ref 14–18)
HGB BLD-MCNC: 12.2 G/DL (ref 14–18)
IMM GRANULOCYTES # BLD AUTO: 0.1 K/UL (ref 0–0.04)
IMM GRANULOCYTES # BLD AUTO: 0.1 K/UL (ref 0–0.04)
IMM GRANULOCYTES NFR BLD AUTO: 1.5 % (ref 0–0.5)
IMM GRANULOCYTES NFR BLD AUTO: 1.5 % (ref 0–0.5)
LDLC SERPL CALC-MCNC: 93 MG/DL (ref 63–159)
LYMPHOCYTES # BLD AUTO: 0.7 K/UL (ref 1–4.8)
LYMPHOCYTES # BLD AUTO: 0.7 K/UL (ref 1–4.8)
LYMPHOCYTES NFR BLD: 10.6 % (ref 18–48)
LYMPHOCYTES NFR BLD: 10.6 % (ref 18–48)
MAGNESIUM SERPL-MCNC: 1.5 MG/DL (ref 1.6–2.6)
MCH RBC QN AUTO: 24.5 PG (ref 27–31)
MCH RBC QN AUTO: 24.5 PG (ref 27–31)
MCHC RBC AUTO-ENTMCNC: 27.8 G/DL (ref 32–36)
MCHC RBC AUTO-ENTMCNC: 27.8 G/DL (ref 32–36)
MCV RBC AUTO: 88 FL (ref 82–98)
MCV RBC AUTO: 88 FL (ref 82–98)
MONOCYTES # BLD AUTO: 0.6 K/UL (ref 0.3–1)
MONOCYTES # BLD AUTO: 0.6 K/UL (ref 0.3–1)
MONOCYTES NFR BLD: 9.4 % (ref 4–15)
MONOCYTES NFR BLD: 9.4 % (ref 4–15)
NEUTROPHILS # BLD AUTO: 5.1 K/UL (ref 1.8–7.7)
NEUTROPHILS # BLD AUTO: 5.1 K/UL (ref 1.8–7.7)
NEUTROPHILS NFR BLD: 77.7 % (ref 38–73)
NEUTROPHILS NFR BLD: 77.7 % (ref 38–73)
NONHDLC SERPL-MCNC: 107 MG/DL
NRBC BLD-RTO: 0 /100 WBC
NRBC BLD-RTO: 0 /100 WBC
PHOSPHATE SERPL-MCNC: 2.8 MG/DL (ref 2.7–4.5)
PLATELET # BLD AUTO: 199 K/UL (ref 150–450)
PLATELET # BLD AUTO: 199 K/UL (ref 150–450)
PMV BLD AUTO: 11.7 FL (ref 9.2–12.9)
PMV BLD AUTO: 11.7 FL (ref 9.2–12.9)
POTASSIUM SERPL-SCNC: 3.3 MMOL/L (ref 3.5–5.1)
POTASSIUM SERPL-SCNC: 3.3 MMOL/L (ref 3.5–5.1)
PROT SERPL-MCNC: 6.3 G/DL (ref 6–8.4)
PROT SERPL-MCNC: 6.3 G/DL (ref 6–8.4)
PTH-INTACT SERPL-MCNC: 309 PG/ML (ref 9–77)
RBC # BLD AUTO: 4.98 M/UL (ref 4.6–6.2)
RBC # BLD AUTO: 4.98 M/UL (ref 4.6–6.2)
SODIUM SERPL-SCNC: 145 MMOL/L (ref 136–145)
SODIUM SERPL-SCNC: 145 MMOL/L (ref 136–145)
TRIGL SERPL-MCNC: 70 MG/DL (ref 30–150)
TSH SERPL DL<=0.005 MIU/L-ACNC: 0.88 UIU/ML (ref 0.4–4)
WBC # BLD AUTO: 6.5 K/UL (ref 3.9–12.7)
WBC # BLD AUTO: 6.5 K/UL (ref 3.9–12.7)

## 2021-06-04 PROCEDURE — 83036 HEMOGLOBIN GLYCOSYLATED A1C: CPT | Performed by: INTERNAL MEDICINE

## 2021-06-04 PROCEDURE — 80061 LIPID PANEL: CPT | Performed by: INTERNAL MEDICINE

## 2021-06-04 PROCEDURE — 83970 ASSAY OF PARATHORMONE: CPT | Performed by: INTERNAL MEDICINE

## 2021-06-04 PROCEDURE — 82306 VITAMIN D 25 HYDROXY: CPT | Performed by: INTERNAL MEDICINE

## 2021-06-04 PROCEDURE — 80197 ASSAY OF TACROLIMUS: CPT | Performed by: INTERNAL MEDICINE

## 2021-06-04 PROCEDURE — 87799 DETECT AGENT NOS DNA QUANT: CPT | Performed by: INTERNAL MEDICINE

## 2021-06-04 PROCEDURE — 80076 HEPATIC FUNCTION PANEL: CPT | Performed by: INTERNAL MEDICINE

## 2021-06-04 PROCEDURE — 84443 ASSAY THYROID STIM HORMONE: CPT | Performed by: INTERNAL MEDICINE

## 2021-06-04 PROCEDURE — 85025 COMPLETE CBC W/AUTO DIFF WBC: CPT | Performed by: INTERNAL MEDICINE

## 2021-06-04 PROCEDURE — 80053 COMPREHEN METABOLIC PANEL: CPT | Performed by: INTERNAL MEDICINE

## 2021-06-04 PROCEDURE — 80069 RENAL FUNCTION PANEL: CPT | Performed by: INTERNAL MEDICINE

## 2021-06-04 PROCEDURE — 83735 ASSAY OF MAGNESIUM: CPT | Performed by: INTERNAL MEDICINE

## 2021-06-05 LAB
TACROLIMUS BLD-MCNC: 5.3 NG/ML (ref 5–15)
TACROLIMUS, NORMALIZED: 4.8 NG/ML (ref 5–15)

## 2021-06-07 RX ORDER — CALCITRIOL 0.25 UG/1
0.25 CAPSULE ORAL DAILY
Qty: 30 CAPSULE | Refills: 11 | Status: SHIPPED | OUTPATIENT
Start: 2021-06-07 | End: 2022-06-06 | Stop reason: SDUPTHER

## 2021-06-07 RX ORDER — POTASSIUM CHLORIDE 20 MEQ/1
20 TABLET, EXTENDED RELEASE ORAL DAILY
Qty: 5 TABLET | Refills: 0 | Status: SHIPPED | OUTPATIENT
Start: 2021-06-07 | End: 2021-06-13

## 2021-06-07 RX ORDER — ERGOCALCIFEROL 1.25 MG/1
50000 CAPSULE ORAL
Qty: 2 CAPSULE | Refills: 11 | Status: SHIPPED | OUTPATIENT
Start: 2021-06-07 | End: 2021-08-19 | Stop reason: SDUPTHER

## 2021-06-11 ENCOUNTER — OFFICE VISIT (OUTPATIENT)
Dept: TRANSPLANT | Facility: CLINIC | Age: 66
End: 2021-06-11
Payer: COMMERCIAL

## 2021-06-11 ENCOUNTER — LAB VISIT (OUTPATIENT)
Dept: LAB | Facility: HOSPITAL | Age: 66
End: 2021-06-11
Attending: INTERNAL MEDICINE
Payer: COMMERCIAL

## 2021-06-11 VITALS
SYSTOLIC BLOOD PRESSURE: 133 MMHG | HEART RATE: 114 BPM | HEIGHT: 67 IN | TEMPERATURE: 98 F | DIASTOLIC BLOOD PRESSURE: 61 MMHG | RESPIRATION RATE: 18 BRPM | WEIGHT: 293.63 LBS | BODY MASS INDEX: 46.09 KG/M2 | OXYGEN SATURATION: 95 %

## 2021-06-11 DIAGNOSIS — Z94.0 KIDNEY REPLACED BY TRANSPLANT: ICD-10-CM

## 2021-06-11 DIAGNOSIS — N18.30 HYPERTENSION ASSOCIATED WITH STAGE 3 CHRONIC KIDNEY DISEASE DUE TO TYPE 2 DIABETES MELLITUS: ICD-10-CM

## 2021-06-11 DIAGNOSIS — I12.9 HYPERTENSION ASSOCIATED WITH STAGE 3 CHRONIC KIDNEY DISEASE DUE TO TYPE 2 DIABETES MELLITUS: ICD-10-CM

## 2021-06-11 DIAGNOSIS — E11.22 HYPERTENSION ASSOCIATED WITH STAGE 3 CHRONIC KIDNEY DISEASE DUE TO TYPE 2 DIABETES MELLITUS: ICD-10-CM

## 2021-06-11 DIAGNOSIS — Z94.0 KIDNEY TRANSPLANT STATUS, LIVING RELATED DONOR: Primary | Chronic | ICD-10-CM

## 2021-06-11 DIAGNOSIS — N18.31 STAGE 3A CHRONIC KIDNEY DISEASE: ICD-10-CM

## 2021-06-11 DIAGNOSIS — Z29.89 PROPHYLACTIC IMMUNOTHERAPY: Chronic | ICD-10-CM

## 2021-06-11 LAB
ALBUMIN SERPL BCP-MCNC: 3.3 G/DL (ref 3.5–5.2)
ANION GAP SERPL CALC-SCNC: 12 MMOL/L (ref 8–16)
BUN SERPL-MCNC: 16 MG/DL (ref 8–23)
CALCIUM SERPL-MCNC: 9.1 MG/DL (ref 8.7–10.5)
CHLORIDE SERPL-SCNC: 107 MMOL/L (ref 95–110)
CO2 SERPL-SCNC: 27 MMOL/L (ref 23–29)
CREAT SERPL-MCNC: 1.6 MG/DL (ref 0.5–1.4)
EST. GFR  (AFRICAN AMERICAN): 50.8 ML/MIN/1.73 M^2
EST. GFR  (NON AFRICAN AMERICAN): 43.9 ML/MIN/1.73 M^2
GLUCOSE SERPL-MCNC: 187 MG/DL (ref 70–110)
PHOSPHATE SERPL-MCNC: 2.6 MG/DL (ref 2.7–4.5)
POTASSIUM SERPL-SCNC: 3.7 MMOL/L (ref 3.5–5.1)
SODIUM SERPL-SCNC: 146 MMOL/L (ref 136–145)

## 2021-06-11 PROCEDURE — 3008F PR BODY MASS INDEX (BMI) DOCUMENTED: ICD-10-PCS | Mod: CPTII,S$GLB,, | Performed by: NURSE PRACTITIONER

## 2021-06-11 PROCEDURE — 1101F PR PT FALLS ASSESS DOC 0-1 FALLS W/OUT INJ PAST YR: ICD-10-PCS | Mod: CPTII,S$GLB,, | Performed by: NURSE PRACTITIONER

## 2021-06-11 PROCEDURE — 3075F PR MOST RECENT SYSTOLIC BLOOD PRESS GE 130-139MM HG: ICD-10-PCS | Mod: CPTII,S$GLB,, | Performed by: NURSE PRACTITIONER

## 2021-06-11 PROCEDURE — 3078F PR MOST RECENT DIASTOLIC BLOOD PRESSURE < 80 MM HG: ICD-10-PCS | Mod: CPTII,S$GLB,, | Performed by: NURSE PRACTITIONER

## 2021-06-11 PROCEDURE — 36415 COLL VENOUS BLD VENIPUNCTURE: CPT | Performed by: INTERNAL MEDICINE

## 2021-06-11 PROCEDURE — 3288F PR FALLS RISK ASSESSMENT DOCUMENTED: ICD-10-PCS | Mod: CPTII,S$GLB,, | Performed by: NURSE PRACTITIONER

## 2021-06-11 PROCEDURE — 99215 PR OFFICE/OUTPT VISIT, EST, LEVL V, 40-54 MIN: ICD-10-PCS | Mod: S$GLB,,, | Performed by: NURSE PRACTITIONER

## 2021-06-11 PROCEDURE — 1101F PT FALLS ASSESS-DOCD LE1/YR: CPT | Mod: CPTII,S$GLB,, | Performed by: NURSE PRACTITIONER

## 2021-06-11 PROCEDURE — 3008F BODY MASS INDEX DOCD: CPT | Mod: CPTII,S$GLB,, | Performed by: NURSE PRACTITIONER

## 2021-06-11 PROCEDURE — 3075F SYST BP GE 130 - 139MM HG: CPT | Mod: CPTII,S$GLB,, | Performed by: NURSE PRACTITIONER

## 2021-06-11 PROCEDURE — 99999 PR PBB SHADOW E&M-EST. PATIENT-LVL III: CPT | Mod: PBBFAC,,, | Performed by: NURSE PRACTITIONER

## 2021-06-11 PROCEDURE — 99999 PR PBB SHADOW E&M-EST. PATIENT-LVL III: ICD-10-PCS | Mod: PBBFAC,,, | Performed by: NURSE PRACTITIONER

## 2021-06-11 PROCEDURE — 1159F MED LIST DOCD IN RCRD: CPT | Mod: S$GLB,,, | Performed by: NURSE PRACTITIONER

## 2021-06-11 PROCEDURE — 1159F PR MEDICATION LIST DOCUMENTED IN MEDICAL RECORD: ICD-10-PCS | Mod: S$GLB,,, | Performed by: NURSE PRACTITIONER

## 2021-06-11 PROCEDURE — 3078F DIAST BP <80 MM HG: CPT | Mod: CPTII,S$GLB,, | Performed by: NURSE PRACTITIONER

## 2021-06-11 PROCEDURE — 1126F PR PAIN SEVERITY QUANTIFIED, NO PAIN PRESENT: ICD-10-PCS | Mod: S$GLB,,, | Performed by: NURSE PRACTITIONER

## 2021-06-11 PROCEDURE — 1126F AMNT PAIN NOTED NONE PRSNT: CPT | Mod: S$GLB,,, | Performed by: NURSE PRACTITIONER

## 2021-06-11 PROCEDURE — 99215 OFFICE O/P EST HI 40 MIN: CPT | Mod: S$GLB,,, | Performed by: NURSE PRACTITIONER

## 2021-06-11 PROCEDURE — 80069 RENAL FUNCTION PANEL: CPT | Performed by: INTERNAL MEDICINE

## 2021-06-11 PROCEDURE — 3288F FALL RISK ASSESSMENT DOCD: CPT | Mod: CPTII,S$GLB,, | Performed by: NURSE PRACTITIONER

## 2021-06-11 RX ORDER — PREDNISONE 5 MG/1
5 TABLET ORAL DAILY
Qty: 30 TABLET | Refills: 11 | Status: SHIPPED | OUTPATIENT
Start: 2021-06-11 | End: 2022-06-17 | Stop reason: SDUPTHER

## 2021-06-11 RX ORDER — MYCOPHENOLATE MOFETIL 250 MG/1
750 CAPSULE ORAL 2 TIMES DAILY
Qty: 180 CAPSULE | Refills: 11 | Status: SHIPPED | OUTPATIENT
Start: 2021-06-11 | End: 2022-06-11 | Stop reason: SDUPTHER

## 2021-06-11 RX ORDER — KETOCONAZOLE 200 MG/1
100 TABLET ORAL DAILY
Qty: 15 TABLET | Refills: 9 | Status: SHIPPED | OUTPATIENT
Start: 2021-06-11 | End: 2022-04-20

## 2021-06-11 RX ORDER — TACROLIMUS 1 MG/1
4 CAPSULE ORAL EVERY 12 HOURS
Qty: 240 CAPSULE | Refills: 11 | Status: ON HOLD | OUTPATIENT
Start: 2021-06-11 | End: 2021-12-08 | Stop reason: HOSPADM

## 2021-06-28 ENCOUNTER — PATIENT MESSAGE (OUTPATIENT)
Dept: PHARMACY | Facility: CLINIC | Age: 66
End: 2021-06-28

## 2021-07-13 ENCOUNTER — TELEPHONE (OUTPATIENT)
Dept: INTERNAL MEDICINE | Facility: CLINIC | Age: 66
End: 2021-07-13

## 2021-07-13 RX ORDER — INSULIN ASPART 100 [IU]/ML
25 INJECTION, SOLUTION INTRAVENOUS; SUBCUTANEOUS
Qty: 30 ML | Refills: 0 | Status: SHIPPED | OUTPATIENT
Start: 2021-07-13 | End: 2021-09-28 | Stop reason: SDUPTHER

## 2021-07-15 ENCOUNTER — PATIENT OUTREACH (OUTPATIENT)
Dept: ADMINISTRATIVE | Facility: OTHER | Age: 66
End: 2021-07-15

## 2021-07-16 ENCOUNTER — TELEPHONE (OUTPATIENT)
Dept: ADMINISTRATIVE | Facility: HOSPITAL | Age: 66
End: 2021-07-16

## 2021-07-16 ENCOUNTER — OFFICE VISIT (OUTPATIENT)
Dept: CARDIOLOGY | Facility: CLINIC | Age: 66
End: 2021-07-16
Payer: COMMERCIAL

## 2021-07-16 VITALS
OXYGEN SATURATION: 97 % | DIASTOLIC BLOOD PRESSURE: 74 MMHG | WEIGHT: 295.19 LBS | HEART RATE: 103 BPM | HEIGHT: 67 IN | RESPIRATION RATE: 16 BRPM | BODY MASS INDEX: 46.33 KG/M2 | SYSTOLIC BLOOD PRESSURE: 128 MMHG

## 2021-07-16 DIAGNOSIS — N18.31 STAGE 3A CHRONIC KIDNEY DISEASE: ICD-10-CM

## 2021-07-16 DIAGNOSIS — I50.42 CHRONIC COMBINED SYSTOLIC AND DIASTOLIC CONGESTIVE HEART FAILURE: Primary | ICD-10-CM

## 2021-07-16 DIAGNOSIS — N18.30 HYPERTENSION ASSOCIATED WITH STAGE 3 CHRONIC KIDNEY DISEASE DUE TO TYPE 2 DIABETES MELLITUS: ICD-10-CM

## 2021-07-16 DIAGNOSIS — I82.492 ACUTE DEEP VEIN THROMBOSIS (DVT) OF OTHER SPECIFIED VEIN OF LEFT LOWER EXTREMITY: ICD-10-CM

## 2021-07-16 DIAGNOSIS — G47.33 OBSTRUCTIVE SLEEP APNEA SYNDROME: ICD-10-CM

## 2021-07-16 DIAGNOSIS — I12.9 HYPERTENSION ASSOCIATED WITH STAGE 3 CHRONIC KIDNEY DISEASE DUE TO TYPE 2 DIABETES MELLITUS: ICD-10-CM

## 2021-07-16 DIAGNOSIS — E11.22 HYPERTENSION ASSOCIATED WITH STAGE 3 CHRONIC KIDNEY DISEASE DUE TO TYPE 2 DIABETES MELLITUS: ICD-10-CM

## 2021-07-16 DIAGNOSIS — I87.2 CHRONIC VENOUS INSUFFICIENCY OF LOWER EXTREMITY: ICD-10-CM

## 2021-07-16 DIAGNOSIS — Z94.0 KIDNEY TRANSPLANT STATUS, LIVING RELATED DONOR: Chronic | ICD-10-CM

## 2021-07-16 DIAGNOSIS — E11.3553 TYPE 2 DIABETES MELLITUS WITH STABLE PROLIFERATIVE RETINOPATHY OF BOTH EYES, WITH LONG-TERM CURRENT USE OF INSULIN: ICD-10-CM

## 2021-07-16 DIAGNOSIS — Z79.4 TYPE 2 DIABETES MELLITUS WITH STABLE PROLIFERATIVE RETINOPATHY OF BOTH EYES, WITH LONG-TERM CURRENT USE OF INSULIN: ICD-10-CM

## 2021-07-16 PROCEDURE — 3044F PR MOST RECENT HEMOGLOBIN A1C LEVEL <7.0%: ICD-10-PCS | Mod: CPTII,S$GLB,, | Performed by: INTERNAL MEDICINE

## 2021-07-16 PROCEDURE — 99999 PR PBB SHADOW E&M-EST. PATIENT-LVL V: ICD-10-PCS | Mod: PBBFAC,,, | Performed by: INTERNAL MEDICINE

## 2021-07-16 PROCEDURE — 99999 PR PBB SHADOW E&M-EST. PATIENT-LVL V: CPT | Mod: PBBFAC,,, | Performed by: INTERNAL MEDICINE

## 2021-07-16 PROCEDURE — 99214 PR OFFICE/OUTPT VISIT, EST, LEVL IV, 30-39 MIN: ICD-10-PCS | Mod: S$GLB,,, | Performed by: INTERNAL MEDICINE

## 2021-07-16 PROCEDURE — 3044F HG A1C LEVEL LT 7.0%: CPT | Mod: CPTII,S$GLB,, | Performed by: INTERNAL MEDICINE

## 2021-07-16 PROCEDURE — 1159F MED LIST DOCD IN RCRD: CPT | Mod: S$GLB,,, | Performed by: INTERNAL MEDICINE

## 2021-07-16 PROCEDURE — 3074F SYST BP LT 130 MM HG: CPT | Mod: CPTII,S$GLB,, | Performed by: INTERNAL MEDICINE

## 2021-07-16 PROCEDURE — 3008F BODY MASS INDEX DOCD: CPT | Mod: CPTII,S$GLB,, | Performed by: INTERNAL MEDICINE

## 2021-07-16 PROCEDURE — 3078F PR MOST RECENT DIASTOLIC BLOOD PRESSURE < 80 MM HG: ICD-10-PCS | Mod: CPTII,S$GLB,, | Performed by: INTERNAL MEDICINE

## 2021-07-16 PROCEDURE — 1126F PR PAIN SEVERITY QUANTIFIED, NO PAIN PRESENT: ICD-10-PCS | Mod: S$GLB,,, | Performed by: INTERNAL MEDICINE

## 2021-07-16 PROCEDURE — 1159F PR MEDICATION LIST DOCUMENTED IN MEDICAL RECORD: ICD-10-PCS | Mod: S$GLB,,, | Performed by: INTERNAL MEDICINE

## 2021-07-16 PROCEDURE — 3074F PR MOST RECENT SYSTOLIC BLOOD PRESSURE < 130 MM HG: ICD-10-PCS | Mod: CPTII,S$GLB,, | Performed by: INTERNAL MEDICINE

## 2021-07-16 PROCEDURE — 1126F AMNT PAIN NOTED NONE PRSNT: CPT | Mod: S$GLB,,, | Performed by: INTERNAL MEDICINE

## 2021-07-16 PROCEDURE — 3078F DIAST BP <80 MM HG: CPT | Mod: CPTII,S$GLB,, | Performed by: INTERNAL MEDICINE

## 2021-07-16 PROCEDURE — 99214 OFFICE O/P EST MOD 30 MIN: CPT | Mod: S$GLB,,, | Performed by: INTERNAL MEDICINE

## 2021-07-16 PROCEDURE — 3008F PR BODY MASS INDEX (BMI) DOCUMENTED: ICD-10-PCS | Mod: CPTII,S$GLB,, | Performed by: INTERNAL MEDICINE

## 2021-07-16 RX ORDER — POLYETHYLENE GLYCOL 3350 17 G/17G
POWDER, FOR SOLUTION ORAL
COMMUNITY
End: 2022-02-25 | Stop reason: ALTCHOICE

## 2021-07-16 RX ORDER — LANOLIN ALCOHOL/MO/W.PET/CERES
400 CREAM (GRAM) TOPICAL DAILY
Qty: 90 TABLET | Refills: 1 | Status: SHIPPED | OUTPATIENT
Start: 2021-07-16 | End: 2022-01-11 | Stop reason: SDUPTHER

## 2021-07-16 RX ORDER — SACUBITRIL AND VALSARTAN 49; 51 MG/1; MG/1
1 TABLET, FILM COATED ORAL 2 TIMES DAILY
Qty: 60 TABLET | Refills: 3 | Status: SHIPPED | OUTPATIENT
Start: 2021-07-16 | End: 2021-11-29 | Stop reason: SDUPTHER

## 2021-07-27 ENCOUNTER — HOSPITAL ENCOUNTER (OUTPATIENT)
Dept: CARDIOLOGY | Facility: HOSPITAL | Age: 66
Discharge: HOME | End: 2021-07-27
Attending: INTERNAL MEDICINE
Payer: COMMERCIAL

## 2021-07-27 VITALS
HEIGHT: 67 IN | SYSTOLIC BLOOD PRESSURE: 128 MMHG | BODY MASS INDEX: 46.3 KG/M2 | DIASTOLIC BLOOD PRESSURE: 74 MMHG | WEIGHT: 295 LBS

## 2021-07-27 DIAGNOSIS — I12.9 HYPERTENSION ASSOCIATED WITH STAGE 3 CHRONIC KIDNEY DISEASE DUE TO TYPE 2 DIABETES MELLITUS: ICD-10-CM

## 2021-07-27 DIAGNOSIS — G47.33 OBSTRUCTIVE SLEEP APNEA SYNDROME: ICD-10-CM

## 2021-07-27 DIAGNOSIS — E11.22 HYPERTENSION ASSOCIATED WITH STAGE 3 CHRONIC KIDNEY DISEASE DUE TO TYPE 2 DIABETES MELLITUS: ICD-10-CM

## 2021-07-27 DIAGNOSIS — I87.2 CHRONIC VENOUS INSUFFICIENCY OF LOWER EXTREMITY: ICD-10-CM

## 2021-07-27 DIAGNOSIS — N18.30 HYPERTENSION ASSOCIATED WITH STAGE 3 CHRONIC KIDNEY DISEASE DUE TO TYPE 2 DIABETES MELLITUS: ICD-10-CM

## 2021-07-27 DIAGNOSIS — I50.42 CHRONIC COMBINED SYSTOLIC AND DIASTOLIC CONGESTIVE HEART FAILURE: ICD-10-CM

## 2021-07-27 LAB
AORTIC ROOT ANNULUS: 4.18 CM
AV INDEX (PROSTH): 0.81
AV MEAN GRADIENT: 2 MMHG
AV PEAK GRADIENT: 4 MMHG
AV VALVE AREA: 3.92 CM2
AV VELOCITY RATIO: 0.75
BSA FOR ECHO PROCEDURE: 2.51 M2
CV ECHO LV RWT: 0.65 CM
DOP CALC AO PEAK VEL: 1.02 M/S
DOP CALC AO VTI: 17.29 CM
DOP CALC LVOT AREA: 4.8 CM2
DOP CALC LVOT DIAMETER: 2.48 CM
DOP CALC LVOT PEAK VEL: 0.77 M/S
DOP CALC LVOT STROKE VOLUME: 67.83 CM3
DOP CALC RVOT PEAK VEL: 1.22 M/S
DOP CALC RVOT VTI: 18.72 CM
DOP CALCLVOT PEAK VEL VTI: 14.05 CM
E WAVE DECELERATION TIME: 151.81 MSEC
E/E' RATIO: 22.18 M/S
ECHO LV POSTERIOR WALL: 1.67 CM (ref 0.6–1.1)
EJECTION FRACTION: 40 %
FRACTIONAL SHORTENING: 15 % (ref 28–44)
INTERVENTRICULAR SEPTUM: 1.83 CM (ref 0.6–1.1)
LA MAJOR: 6.98 CM
LA MINOR: 5.73 CM
LA WIDTH: 4.75 CM
LEFT ATRIUM SIZE: 4.36 CM
LEFT ATRIUM VOLUME INDEX: 46.4 ML/M2
LEFT ATRIUM VOLUME: 110.79 CM3
LEFT INTERNAL DIMENSION IN SYSTOLE: 4.37 CM (ref 2.1–4)
LEFT VENTRICLE DIASTOLIC VOLUME INDEX: 53.34 ML/M2
LEFT VENTRICLE DIASTOLIC VOLUME: 127.49 ML
LEFT VENTRICLE MASS INDEX: 179 G/M2
LEFT VENTRICLE SYSTOLIC VOLUME INDEX: 36.2 ML/M2
LEFT VENTRICLE SYSTOLIC VOLUME: 86.44 ML
LEFT VENTRICULAR INTERNAL DIMENSION IN DIASTOLE: 5.16 CM (ref 3.5–6)
LEFT VENTRICULAR MASS: 426.65 G
LV LATERAL E/E' RATIO: 24.4 M/S
LV SEPTAL E/E' RATIO: 20.33 M/S
MV PEAK A VEL: 0 M/S
MV PEAK E VEL: 1.22 M/S
MV STENOSIS PRESSURE HALF TIME: 44.02 MS
MV VALVE AREA P 1/2 METHOD: 5 CM2
PV MEAN GRADIENT: 2 MMHG
PV PEAK VELOCITY: 1.17 CM/S
RA PRESSURE: 3 MMHG
SINUS: 4.09 CM
STJ: 4.07 CM
TDI LATERAL: 0.05 M/S
TDI SEPTAL: 0.06 M/S
TDI: 0.06 M/S

## 2021-07-27 PROCEDURE — 93306 TTE W/DOPPLER COMPLETE: CPT

## 2021-07-27 PROCEDURE — 93306 ECHO (CUPID ONLY): ICD-10-PCS | Mod: 26,,, | Performed by: INTERNAL MEDICINE

## 2021-07-27 PROCEDURE — 25500020 PHARM REV CODE 255: Performed by: INTERNAL MEDICINE

## 2021-07-27 PROCEDURE — 93306 TTE W/DOPPLER COMPLETE: CPT | Mod: 26,,, | Performed by: INTERNAL MEDICINE

## 2021-07-27 RX ADMIN — HUMAN ALBUMIN MICROSPHERES AND PERFLUTREN 0.66 MG: 10; .22 INJECTION, SOLUTION INTRAVENOUS at 11:07

## 2021-08-19 DIAGNOSIS — N18.9 ANEMIA ASSOCIATED WITH CHRONIC RENAL FAILURE: ICD-10-CM

## 2021-08-19 DIAGNOSIS — I87.2 CHRONIC VENOUS INSUFFICIENCY OF LOWER EXTREMITY: ICD-10-CM

## 2021-08-19 DIAGNOSIS — I25.10 CORONARY ARTERY DISEASE INVOLVING NATIVE CORONARY ARTERY OF NATIVE HEART WITHOUT ANGINA PECTORIS: Primary | ICD-10-CM

## 2021-08-19 DIAGNOSIS — I12.9 HYPERTENSION ASSOCIATED WITH STAGE 3 CHRONIC KIDNEY DISEASE DUE TO TYPE 2 DIABETES MELLITUS: ICD-10-CM

## 2021-08-19 DIAGNOSIS — E11.22 HYPERTENSION ASSOCIATED WITH STAGE 3 CHRONIC KIDNEY DISEASE DUE TO TYPE 2 DIABETES MELLITUS: ICD-10-CM

## 2021-08-19 DIAGNOSIS — D63.1 ANEMIA ASSOCIATED WITH CHRONIC RENAL FAILURE: ICD-10-CM

## 2021-08-19 DIAGNOSIS — N18.31 STAGE 3A CHRONIC KIDNEY DISEASE: ICD-10-CM

## 2021-08-19 DIAGNOSIS — I50.42 CHRONIC COMBINED SYSTOLIC AND DIASTOLIC CONGESTIVE HEART FAILURE: ICD-10-CM

## 2021-08-19 DIAGNOSIS — G47.30 SLEEP APNEA, UNSPECIFIED TYPE: ICD-10-CM

## 2021-08-19 DIAGNOSIS — E66.01 CLASS 3 SEVERE OBESITY DUE TO EXCESS CALORIES WITH SERIOUS COMORBIDITY AND BODY MASS INDEX (BMI) OF 40.0 TO 44.9 IN ADULT: ICD-10-CM

## 2021-08-19 DIAGNOSIS — E11.8 DM (DIABETES MELLITUS), TYPE 2 WITH COMPLICATIONS: ICD-10-CM

## 2021-08-19 DIAGNOSIS — N18.30 HYPERTENSION ASSOCIATED WITH STAGE 3 CHRONIC KIDNEY DISEASE DUE TO TYPE 2 DIABETES MELLITUS: ICD-10-CM

## 2021-08-19 DIAGNOSIS — I82.492 ACUTE DEEP VEIN THROMBOSIS (DVT) OF OTHER SPECIFIED VEIN OF LEFT LOWER EXTREMITY: ICD-10-CM

## 2021-08-19 RX ORDER — ERGOCALCIFEROL 1.25 MG/1
50000 CAPSULE ORAL
Qty: 2 CAPSULE | Refills: 11 | Status: SHIPPED | OUTPATIENT
Start: 2021-08-19 | End: 2022-07-11 | Stop reason: ALTCHOICE

## 2021-08-31 RX ORDER — FUROSEMIDE 80 MG/1
TABLET ORAL
Qty: 30 TABLET | Refills: 11 | Status: SHIPPED | OUTPATIENT
Start: 2021-08-31 | End: 2022-08-09 | Stop reason: SDUPTHER

## 2021-08-31 RX ORDER — GLIMEPIRIDE 4 MG/1
4 TABLET ORAL
Qty: 90 TABLET | Refills: 0 | Status: SHIPPED | OUTPATIENT
Start: 2021-08-31 | End: 2021-10-19

## 2021-09-03 ENCOUNTER — PATIENT MESSAGE (OUTPATIENT)
Dept: PHARMACY | Facility: CLINIC | Age: 66
End: 2021-09-03

## 2021-09-26 DIAGNOSIS — I25.10 CORONARY ARTERY DISEASE INVOLVING NATIVE CORONARY ARTERY OF NATIVE HEART WITHOUT ANGINA PECTORIS: ICD-10-CM

## 2021-09-26 DIAGNOSIS — I87.2 CHRONIC VENOUS INSUFFICIENCY OF LOWER EXTREMITY: ICD-10-CM

## 2021-09-26 DIAGNOSIS — I50.42 CHRONIC COMBINED SYSTOLIC AND DIASTOLIC CONGESTIVE HEART FAILURE: Primary | ICD-10-CM

## 2021-09-27 DIAGNOSIS — I87.2 CHRONIC VENOUS INSUFFICIENCY OF LOWER EXTREMITY: ICD-10-CM

## 2021-09-27 DIAGNOSIS — I50.42 CHRONIC COMBINED SYSTOLIC AND DIASTOLIC CONGESTIVE HEART FAILURE: ICD-10-CM

## 2021-09-27 DIAGNOSIS — I25.10 CORONARY ARTERY DISEASE INVOLVING NATIVE CORONARY ARTERY OF NATIVE HEART WITHOUT ANGINA PECTORIS: ICD-10-CM

## 2021-09-29 DIAGNOSIS — E11.9 TYPE 2 DIABETES MELLITUS WITHOUT COMPLICATION: ICD-10-CM

## 2021-10-06 ENCOUNTER — PATIENT MESSAGE (OUTPATIENT)
Dept: PHARMACY | Facility: CLINIC | Age: 66
End: 2021-10-06

## 2021-10-06 ENCOUNTER — IMMUNIZATION (OUTPATIENT)
Dept: INTERNAL MEDICINE | Facility: CLINIC | Age: 66
End: 2021-10-06
Payer: COMMERCIAL

## 2021-10-06 ENCOUNTER — LAB VISIT (OUTPATIENT)
Dept: LAB | Facility: HOSPITAL | Age: 66
End: 2021-10-06
Attending: INTERNAL MEDICINE
Payer: COMMERCIAL

## 2021-10-06 DIAGNOSIS — E11.21 TYPE 2 DIABETES MELLITUS WITH DIABETIC NEPHROPATHY, UNSPECIFIED WHETHER LONG TERM INSULIN USE: ICD-10-CM

## 2021-10-06 LAB
ALBUMIN SERPL BCP-MCNC: 3.4 G/DL (ref 3.5–5.2)
ALP SERPL-CCNC: 59 U/L (ref 55–135)
ALT SERPL W/O P-5'-P-CCNC: 12 U/L (ref 10–44)
ANION GAP SERPL CALC-SCNC: 16 MMOL/L (ref 8–16)
AST SERPL-CCNC: 14 U/L (ref 10–40)
BASOPHILS # BLD AUTO: 0.03 K/UL (ref 0–0.2)
BASOPHILS NFR BLD: 0.4 % (ref 0–1.9)
BILIRUB SERPL-MCNC: 0.5 MG/DL (ref 0.1–1)
BUN SERPL-MCNC: 17 MG/DL (ref 8–23)
CALCIUM SERPL-MCNC: 8.7 MG/DL (ref 8.7–10.5)
CHLORIDE SERPL-SCNC: 106 MMOL/L (ref 95–110)
CHOLEST SERPL-MCNC: 178 MG/DL (ref 120–199)
CHOLEST/HDLC SERPL: 3.1 {RATIO} (ref 2–5)
CO2 SERPL-SCNC: 22 MMOL/L (ref 23–29)
CREAT SERPL-MCNC: 1.5 MG/DL (ref 0.5–1.4)
DIFFERENTIAL METHOD: ABNORMAL
EOSINOPHIL # BLD AUTO: 0.1 K/UL (ref 0–0.5)
EOSINOPHIL NFR BLD: 0.7 % (ref 0–8)
ERYTHROCYTE [DISTWIDTH] IN BLOOD BY AUTOMATED COUNT: 13.5 % (ref 11.5–14.5)
EST. GFR  (AFRICAN AMERICAN): 54.9 ML/MIN/1.73 M^2
EST. GFR  (NON AFRICAN AMERICAN): 47.5 ML/MIN/1.73 M^2
ESTIMATED AVG GLUCOSE: 134 MG/DL (ref 68–131)
GLUCOSE SERPL-MCNC: 57 MG/DL (ref 70–110)
HBA1C MFR BLD: 6.3 % (ref 4–5.6)
HCT VFR BLD AUTO: 42.5 % (ref 40–54)
HDLC SERPL-MCNC: 58 MG/DL (ref 40–75)
HDLC SERPL: 32.6 % (ref 20–50)
HGB BLD-MCNC: 12.5 G/DL (ref 14–18)
IMM GRANULOCYTES # BLD AUTO: 0.06 K/UL (ref 0–0.04)
IMM GRANULOCYTES NFR BLD AUTO: 0.8 % (ref 0–0.5)
LDLC SERPL CALC-MCNC: 106.2 MG/DL (ref 63–159)
LYMPHOCYTES # BLD AUTO: 1.2 K/UL (ref 1–4.8)
LYMPHOCYTES NFR BLD: 15.9 % (ref 18–48)
MCH RBC QN AUTO: 25.9 PG (ref 27–31)
MCHC RBC AUTO-ENTMCNC: 29.4 G/DL (ref 32–36)
MCV RBC AUTO: 88 FL (ref 82–98)
MONOCYTES # BLD AUTO: 0.8 K/UL (ref 0.3–1)
MONOCYTES NFR BLD: 10.6 % (ref 4–15)
NEUTROPHILS # BLD AUTO: 5.2 K/UL (ref 1.8–7.7)
NEUTROPHILS NFR BLD: 71.6 % (ref 38–73)
NONHDLC SERPL-MCNC: 120 MG/DL
NRBC BLD-RTO: 0 /100 WBC
PLATELET # BLD AUTO: 179 K/UL (ref 150–450)
PMV BLD AUTO: 11.8 FL (ref 9.2–12.9)
POTASSIUM SERPL-SCNC: 3.7 MMOL/L (ref 3.5–5.1)
PROT SERPL-MCNC: 6.4 G/DL (ref 6–8.4)
RBC # BLD AUTO: 4.82 M/UL (ref 4.6–6.2)
SODIUM SERPL-SCNC: 144 MMOL/L (ref 136–145)
TRIGL SERPL-MCNC: 69 MG/DL (ref 30–150)
TSH SERPL DL<=0.005 MIU/L-ACNC: 0.74 UIU/ML (ref 0.4–4)
WBC # BLD AUTO: 7.28 K/UL (ref 3.9–12.7)

## 2021-10-06 PROCEDURE — 84443 ASSAY THYROID STIM HORMONE: CPT | Performed by: INTERNAL MEDICINE

## 2021-10-06 PROCEDURE — 83036 HEMOGLOBIN GLYCOSYLATED A1C: CPT | Performed by: INTERNAL MEDICINE

## 2021-10-06 PROCEDURE — 36415 COLL VENOUS BLD VENIPUNCTURE: CPT | Mod: PO | Performed by: INTERNAL MEDICINE

## 2021-10-06 PROCEDURE — 85025 COMPLETE CBC W/AUTO DIFF WBC: CPT | Performed by: INTERNAL MEDICINE

## 2021-10-06 PROCEDURE — 90694 FLU VACCINE - QUADRIVALENT - ADJUVANTED: ICD-10-PCS | Mod: S$GLB,,, | Performed by: INTERNAL MEDICINE

## 2021-10-06 PROCEDURE — 80061 LIPID PANEL: CPT | Performed by: INTERNAL MEDICINE

## 2021-10-06 PROCEDURE — 90694 VACC AIIV4 NO PRSRV 0.5ML IM: CPT | Mod: S$GLB,,, | Performed by: INTERNAL MEDICINE

## 2021-10-06 PROCEDURE — 90471 FLU VACCINE - QUADRIVALENT - ADJUVANTED: ICD-10-PCS | Mod: S$GLB,,, | Performed by: INTERNAL MEDICINE

## 2021-10-06 PROCEDURE — 80053 COMPREHEN METABOLIC PANEL: CPT | Performed by: INTERNAL MEDICINE

## 2021-10-06 PROCEDURE — 90471 IMMUNIZATION ADMIN: CPT | Mod: S$GLB,,, | Performed by: INTERNAL MEDICINE

## 2021-10-07 ENCOUNTER — PATIENT MESSAGE (OUTPATIENT)
Dept: INTERNAL MEDICINE | Facility: CLINIC | Age: 66
End: 2021-10-07

## 2021-10-11 NOTE — ED NOTES
Bed: 13  Expected date:   Expected time:   Means of arrival: Personal Transportation  Comments:     Corina Gordon RN  12/03/18 [id number]    
Patient placed on continuous cardiac monitor, automatic blood pressure cuff and continuous pulse oximeter.  
98.3

## 2021-10-15 ENCOUNTER — OFFICE VISIT (OUTPATIENT)
Dept: INTERNAL MEDICINE | Facility: CLINIC | Age: 66
End: 2021-10-15
Payer: COMMERCIAL

## 2021-10-15 VITALS
TEMPERATURE: 98 F | SYSTOLIC BLOOD PRESSURE: 147 MMHG | BODY MASS INDEX: 47.89 KG/M2 | HEIGHT: 67 IN | WEIGHT: 305.13 LBS | DIASTOLIC BLOOD PRESSURE: 67 MMHG | HEART RATE: 92 BPM | OXYGEN SATURATION: 95 %

## 2021-10-15 DIAGNOSIS — N40.0 BENIGN PROSTATIC HYPERPLASIA WITHOUT LOWER URINARY TRACT SYMPTOMS: ICD-10-CM

## 2021-10-15 DIAGNOSIS — G47.33 OBSTRUCTIVE SLEEP APNEA SYNDROME: ICD-10-CM

## 2021-10-15 DIAGNOSIS — N25.81 SECONDARY HYPERPARATHYROIDISM OF RENAL ORIGIN: ICD-10-CM

## 2021-10-15 DIAGNOSIS — N18.9 ANEMIA ASSOCIATED WITH CHRONIC RENAL FAILURE: ICD-10-CM

## 2021-10-15 DIAGNOSIS — Z86.010 HISTORY OF COLON POLYPS: ICD-10-CM

## 2021-10-15 DIAGNOSIS — Z00.00 ROUTINE GENERAL MEDICAL EXAMINATION AT A HEALTH CARE FACILITY: Primary | ICD-10-CM

## 2021-10-15 DIAGNOSIS — I25.10 CORONARY ARTERY DISEASE INVOLVING NATIVE CORONARY ARTERY OF NATIVE HEART WITHOUT ANGINA PECTORIS: ICD-10-CM

## 2021-10-15 DIAGNOSIS — Z29.89 PROPHYLACTIC IMMUNOTHERAPY: Chronic | ICD-10-CM

## 2021-10-15 DIAGNOSIS — E66.01 CLASS 3 SEVERE OBESITY DUE TO EXCESS CALORIES WITH SERIOUS COMORBIDITY AND BODY MASS INDEX (BMI) OF 40.0 TO 44.9 IN ADULT: ICD-10-CM

## 2021-10-15 DIAGNOSIS — E11.22 HYPERTENSION ASSOCIATED WITH STAGE 3 CHRONIC KIDNEY DISEASE DUE TO TYPE 2 DIABETES MELLITUS: ICD-10-CM

## 2021-10-15 DIAGNOSIS — I12.9 HYPERTENSION ASSOCIATED WITH STAGE 3 CHRONIC KIDNEY DISEASE DUE TO TYPE 2 DIABETES MELLITUS: ICD-10-CM

## 2021-10-15 DIAGNOSIS — Z79.4 TYPE 2 DIABETES MELLITUS WITH STABLE PROLIFERATIVE RETINOPATHY OF BOTH EYES, WITH LONG-TERM CURRENT USE OF INSULIN: ICD-10-CM

## 2021-10-15 DIAGNOSIS — E11.42 DIABETIC PERIPHERAL NEUROPATHY: ICD-10-CM

## 2021-10-15 DIAGNOSIS — Z12.5 PROSTATE CANCER SCREENING: ICD-10-CM

## 2021-10-15 DIAGNOSIS — E11.8 DM (DIABETES MELLITUS), TYPE 2 WITH COMPLICATIONS: ICD-10-CM

## 2021-10-15 DIAGNOSIS — I50.42 CHRONIC COMBINED SYSTOLIC AND DIASTOLIC CONGESTIVE HEART FAILURE: ICD-10-CM

## 2021-10-15 DIAGNOSIS — N18.31 STAGE 3A CHRONIC KIDNEY DISEASE: ICD-10-CM

## 2021-10-15 DIAGNOSIS — I82.492 ACUTE DEEP VEIN THROMBOSIS (DVT) OF OTHER SPECIFIED VEIN OF LEFT LOWER EXTREMITY: ICD-10-CM

## 2021-10-15 DIAGNOSIS — E66.01 MORBID OBESITY WITH BMI OF 45.0-49.9, ADULT: ICD-10-CM

## 2021-10-15 DIAGNOSIS — E11.21 TYPE 2 DIABETES MELLITUS WITH DIABETIC NEPHROPATHY, UNSPECIFIED WHETHER LONG TERM INSULIN USE: ICD-10-CM

## 2021-10-15 DIAGNOSIS — N18.30 HYPERTENSION ASSOCIATED WITH STAGE 3 CHRONIC KIDNEY DISEASE DUE TO TYPE 2 DIABETES MELLITUS: ICD-10-CM

## 2021-10-15 DIAGNOSIS — Z94.0 KIDNEY TRANSPLANT STATUS, LIVING RELATED DONOR: Chronic | ICD-10-CM

## 2021-10-15 DIAGNOSIS — D63.1 ANEMIA ASSOCIATED WITH CHRONIC RENAL FAILURE: ICD-10-CM

## 2021-10-15 DIAGNOSIS — I87.2 CHRONIC VENOUS INSUFFICIENCY OF LOWER EXTREMITY: ICD-10-CM

## 2021-10-15 DIAGNOSIS — E11.3553 TYPE 2 DIABETES MELLITUS WITH STABLE PROLIFERATIVE RETINOPATHY OF BOTH EYES, WITH LONG-TERM CURRENT USE OF INSULIN: ICD-10-CM

## 2021-10-15 PROCEDURE — 1126F AMNT PAIN NOTED NONE PRSNT: CPT | Mod: CPTII,S$GLB,, | Performed by: INTERNAL MEDICINE

## 2021-10-15 PROCEDURE — 3066F NEPHROPATHY DOC TX: CPT | Mod: CPTII,S$GLB,, | Performed by: INTERNAL MEDICINE

## 2021-10-15 PROCEDURE — 1101F PR PT FALLS ASSESS DOC 0-1 FALLS W/OUT INJ PAST YR: ICD-10-PCS | Mod: CPTII,S$GLB,, | Performed by: INTERNAL MEDICINE

## 2021-10-15 PROCEDURE — 99999 PR PBB SHADOW E&M-EST. PATIENT-LVL V: CPT | Mod: PBBFAC,,, | Performed by: INTERNAL MEDICINE

## 2021-10-15 PROCEDURE — 3044F PR MOST RECENT HEMOGLOBIN A1C LEVEL <7.0%: ICD-10-PCS | Mod: CPTII,S$GLB,, | Performed by: INTERNAL MEDICINE

## 2021-10-15 PROCEDURE — 99397 PER PM REEVAL EST PAT 65+ YR: CPT | Mod: S$GLB,,, | Performed by: INTERNAL MEDICINE

## 2021-10-15 PROCEDURE — 3061F NEG MICROALBUMINURIA REV: CPT | Mod: CPTII,S$GLB,, | Performed by: INTERNAL MEDICINE

## 2021-10-15 PROCEDURE — 3066F PR DOCUMENTATION OF TREATMENT FOR NEPHROPATHY: ICD-10-PCS | Mod: CPTII,S$GLB,, | Performed by: INTERNAL MEDICINE

## 2021-10-15 PROCEDURE — 3078F DIAST BP <80 MM HG: CPT | Mod: CPTII,S$GLB,, | Performed by: INTERNAL MEDICINE

## 2021-10-15 PROCEDURE — 4010F PR ACE/ARB THEARPY RXD/TAKEN: ICD-10-PCS | Mod: CPTII,S$GLB,, | Performed by: INTERNAL MEDICINE

## 2021-10-15 PROCEDURE — 1159F PR MEDICATION LIST DOCUMENTED IN MEDICAL RECORD: ICD-10-PCS | Mod: CPTII,S$GLB,, | Performed by: INTERNAL MEDICINE

## 2021-10-15 PROCEDURE — 3288F FALL RISK ASSESSMENT DOCD: CPT | Mod: CPTII,S$GLB,, | Performed by: INTERNAL MEDICINE

## 2021-10-15 PROCEDURE — 3008F PR BODY MASS INDEX (BMI) DOCUMENTED: ICD-10-PCS | Mod: CPTII,S$GLB,, | Performed by: INTERNAL MEDICINE

## 2021-10-15 PROCEDURE — 1101F PT FALLS ASSESS-DOCD LE1/YR: CPT | Mod: CPTII,S$GLB,, | Performed by: INTERNAL MEDICINE

## 2021-10-15 PROCEDURE — 1126F PR PAIN SEVERITY QUANTIFIED, NO PAIN PRESENT: ICD-10-PCS | Mod: CPTII,S$GLB,, | Performed by: INTERNAL MEDICINE

## 2021-10-15 PROCEDURE — 3288F PR FALLS RISK ASSESSMENT DOCUMENTED: ICD-10-PCS | Mod: CPTII,S$GLB,, | Performed by: INTERNAL MEDICINE

## 2021-10-15 PROCEDURE — 3061F PR NEG MICROALBUMINURIA RESULT DOCUMENTED/REVIEW: ICD-10-PCS | Mod: CPTII,S$GLB,, | Performed by: INTERNAL MEDICINE

## 2021-10-15 PROCEDURE — 4010F ACE/ARB THERAPY RXD/TAKEN: CPT | Mod: CPTII,S$GLB,, | Performed by: INTERNAL MEDICINE

## 2021-10-15 PROCEDURE — 3078F PR MOST RECENT DIASTOLIC BLOOD PRESSURE < 80 MM HG: ICD-10-PCS | Mod: CPTII,S$GLB,, | Performed by: INTERNAL MEDICINE

## 2021-10-15 PROCEDURE — 99999 PR PBB SHADOW E&M-EST. PATIENT-LVL V: ICD-10-PCS | Mod: PBBFAC,,, | Performed by: INTERNAL MEDICINE

## 2021-10-15 PROCEDURE — 1159F MED LIST DOCD IN RCRD: CPT | Mod: CPTII,S$GLB,, | Performed by: INTERNAL MEDICINE

## 2021-10-15 PROCEDURE — 3077F PR MOST RECENT SYSTOLIC BLOOD PRESSURE >= 140 MM HG: ICD-10-PCS | Mod: CPTII,S$GLB,, | Performed by: INTERNAL MEDICINE

## 2021-10-15 PROCEDURE — 99397 PR PREVENTIVE VISIT,EST,65 & OVER: ICD-10-PCS | Mod: S$GLB,,, | Performed by: INTERNAL MEDICINE

## 2021-10-15 PROCEDURE — 3044F HG A1C LEVEL LT 7.0%: CPT | Mod: CPTII,S$GLB,, | Performed by: INTERNAL MEDICINE

## 2021-10-15 PROCEDURE — 3077F SYST BP >= 140 MM HG: CPT | Mod: CPTII,S$GLB,, | Performed by: INTERNAL MEDICINE

## 2021-10-15 PROCEDURE — 3008F BODY MASS INDEX DOCD: CPT | Mod: CPTII,S$GLB,, | Performed by: INTERNAL MEDICINE

## 2021-10-15 RX ORDER — METOLAZONE 2.5 MG/1
2.5 TABLET ORAL DAILY
Qty: 30 TABLET | Refills: 11 | Status: SHIPPED | OUTPATIENT
Start: 2021-10-15 | End: 2022-02-25

## 2021-10-18 ENCOUNTER — TELEPHONE (OUTPATIENT)
Dept: INTERNAL MEDICINE | Facility: CLINIC | Age: 66
End: 2021-10-18

## 2021-10-18 ENCOUNTER — LAB VISIT (OUTPATIENT)
Dept: LAB | Facility: HOSPITAL | Age: 66
End: 2021-10-18
Attending: INTERNAL MEDICINE
Payer: COMMERCIAL

## 2021-10-18 DIAGNOSIS — E11.8 DM (DIABETES MELLITUS), TYPE 2 WITH COMPLICATIONS: ICD-10-CM

## 2021-10-18 DIAGNOSIS — Z12.5 PROSTATE CANCER SCREENING: ICD-10-CM

## 2021-10-18 LAB
ALBUMIN SERPL BCP-MCNC: 3.5 G/DL (ref 3.5–5.2)
ALP SERPL-CCNC: 56 U/L (ref 55–135)
ALT SERPL W/O P-5'-P-CCNC: 11 U/L (ref 10–44)
ANION GAP SERPL CALC-SCNC: 10 MMOL/L (ref 8–16)
ANION GAP SERPL CALC-SCNC: 10 MMOL/L (ref 8–16)
AST SERPL-CCNC: 14 U/L (ref 10–40)
BASOPHILS # BLD AUTO: 0.02 K/UL (ref 0–0.2)
BASOPHILS NFR BLD: 0.3 % (ref 0–1.9)
BILIRUB SERPL-MCNC: 0.6 MG/DL (ref 0.1–1)
BUN SERPL-MCNC: 17 MG/DL (ref 8–23)
BUN SERPL-MCNC: 17 MG/DL (ref 8–23)
CALCIUM SERPL-MCNC: 8.7 MG/DL (ref 8.7–10.5)
CALCIUM SERPL-MCNC: 8.7 MG/DL (ref 8.7–10.5)
CHLORIDE SERPL-SCNC: 101 MMOL/L (ref 95–110)
CHLORIDE SERPL-SCNC: 101 MMOL/L (ref 95–110)
CHOLEST SERPL-MCNC: 188 MG/DL (ref 120–199)
CHOLEST/HDLC SERPL: 3.2 {RATIO} (ref 2–5)
CO2 SERPL-SCNC: 31 MMOL/L (ref 23–29)
CO2 SERPL-SCNC: 31 MMOL/L (ref 23–29)
COMPLEXED PSA SERPL-MCNC: 0.33 NG/ML (ref 0–4)
CREAT SERPL-MCNC: 1.9 MG/DL (ref 0.5–1.4)
CREAT SERPL-MCNC: 1.9 MG/DL (ref 0.5–1.4)
DIFFERENTIAL METHOD: ABNORMAL
EOSINOPHIL # BLD AUTO: 0 K/UL (ref 0–0.5)
EOSINOPHIL NFR BLD: 0.5 % (ref 0–8)
ERYTHROCYTE [DISTWIDTH] IN BLOOD BY AUTOMATED COUNT: 13.5 % (ref 11.5–14.5)
EST. GFR  (AFRICAN AMERICAN): 41.3 ML/MIN/1.73 M^2
EST. GFR  (AFRICAN AMERICAN): 41.3 ML/MIN/1.73 M^2
EST. GFR  (NON AFRICAN AMERICAN): 35.7 ML/MIN/1.73 M^2
EST. GFR  (NON AFRICAN AMERICAN): 35.7 ML/MIN/1.73 M^2
ESTIMATED AVG GLUCOSE: 128 MG/DL (ref 68–131)
GLUCOSE SERPL-MCNC: 46 MG/DL (ref 70–110)
GLUCOSE SERPL-MCNC: 46 MG/DL (ref 70–110)
HBA1C MFR BLD: 6.1 % (ref 4–5.6)
HCT VFR BLD AUTO: 42.9 % (ref 40–54)
HDLC SERPL-MCNC: 59 MG/DL (ref 40–75)
HDLC SERPL: 31.4 % (ref 20–50)
HGB BLD-MCNC: 12.4 G/DL (ref 14–18)
IMM GRANULOCYTES # BLD AUTO: 0.06 K/UL (ref 0–0.04)
IMM GRANULOCYTES NFR BLD AUTO: 0.8 % (ref 0–0.5)
LDLC SERPL CALC-MCNC: 106.6 MG/DL (ref 63–159)
LYMPHOCYTES # BLD AUTO: 1.2 K/UL (ref 1–4.8)
LYMPHOCYTES NFR BLD: 15 % (ref 18–48)
MCH RBC QN AUTO: 25.3 PG (ref 27–31)
MCHC RBC AUTO-ENTMCNC: 28.9 G/DL (ref 32–36)
MCV RBC AUTO: 87 FL (ref 82–98)
MONOCYTES # BLD AUTO: 0.8 K/UL (ref 0.3–1)
MONOCYTES NFR BLD: 10 % (ref 4–15)
NEUTROPHILS # BLD AUTO: 5.7 K/UL (ref 1.8–7.7)
NEUTROPHILS NFR BLD: 73.4 % (ref 38–73)
NONHDLC SERPL-MCNC: 129 MG/DL
NRBC BLD-RTO: 0 /100 WBC
PLATELET # BLD AUTO: 177 K/UL (ref 150–450)
PMV BLD AUTO: 11.6 FL (ref 9.2–12.9)
POTASSIUM SERPL-SCNC: 3.5 MMOL/L (ref 3.5–5.1)
POTASSIUM SERPL-SCNC: 3.5 MMOL/L (ref 3.5–5.1)
PROT SERPL-MCNC: 6.4 G/DL (ref 6–8.4)
RBC # BLD AUTO: 4.91 M/UL (ref 4.6–6.2)
SODIUM SERPL-SCNC: 142 MMOL/L (ref 136–145)
SODIUM SERPL-SCNC: 142 MMOL/L (ref 136–145)
TRIGL SERPL-MCNC: 112 MG/DL (ref 30–150)
WBC # BLD AUTO: 7.8 K/UL (ref 3.9–12.7)

## 2021-10-18 PROCEDURE — 36415 COLL VENOUS BLD VENIPUNCTURE: CPT | Mod: PO | Performed by: INTERNAL MEDICINE

## 2021-10-18 PROCEDURE — 85025 COMPLETE CBC W/AUTO DIFF WBC: CPT | Performed by: INTERNAL MEDICINE

## 2021-10-18 PROCEDURE — 84153 ASSAY OF PSA TOTAL: CPT | Performed by: INTERNAL MEDICINE

## 2021-10-18 PROCEDURE — 80053 COMPREHEN METABOLIC PANEL: CPT | Performed by: INTERNAL MEDICINE

## 2021-10-18 PROCEDURE — 80061 LIPID PANEL: CPT | Performed by: INTERNAL MEDICINE

## 2021-10-18 PROCEDURE — 83036 HEMOGLOBIN GLYCOSYLATED A1C: CPT | Performed by: INTERNAL MEDICINE

## 2021-10-19 ENCOUNTER — PATIENT MESSAGE (OUTPATIENT)
Dept: INTERNAL MEDICINE | Facility: CLINIC | Age: 66
End: 2021-10-19
Payer: COMMERCIAL

## 2021-10-19 DIAGNOSIS — I50.9 CONGESTIVE HEART FAILURE, UNSPECIFIED HF CHRONICITY, UNSPECIFIED HEART FAILURE TYPE: Primary | ICD-10-CM

## 2021-10-19 RX ORDER — GLIMEPIRIDE 2 MG/1
2 TABLET ORAL
Qty: 90 TABLET | Refills: 3 | Status: SHIPPED | OUTPATIENT
Start: 2021-10-19 | End: 2022-10-20 | Stop reason: SDUPTHER

## 2021-10-21 ENCOUNTER — TELEPHONE (OUTPATIENT)
Dept: INTERNAL MEDICINE | Facility: CLINIC | Age: 66
End: 2021-10-21

## 2021-10-25 ENCOUNTER — TELEPHONE (OUTPATIENT)
Dept: INTERNAL MEDICINE | Facility: CLINIC | Age: 66
End: 2021-10-25
Payer: COMMERCIAL

## 2021-10-25 ENCOUNTER — OFFICE VISIT (OUTPATIENT)
Dept: OPHTHALMOLOGY | Facility: CLINIC | Age: 66
End: 2021-10-25
Payer: COMMERCIAL

## 2021-10-25 ENCOUNTER — PATIENT OUTREACH (OUTPATIENT)
Dept: ADMINISTRATIVE | Facility: OTHER | Age: 66
End: 2021-10-25
Payer: COMMERCIAL

## 2021-10-25 DIAGNOSIS — Z79.4 TYPE 2 DIABETES MELLITUS WITH STABLE PROLIFERATIVE RETINOPATHY OF BOTH EYES, WITH LONG-TERM CURRENT USE OF INSULIN: Primary | ICD-10-CM

## 2021-10-25 DIAGNOSIS — E11.3553 TYPE 2 DIABETES MELLITUS WITH STABLE PROLIFERATIVE RETINOPATHY OF BOTH EYES, WITH LONG-TERM CURRENT USE OF INSULIN: Primary | ICD-10-CM

## 2021-10-25 DIAGNOSIS — H04.123 DRY EYE SYNDROME OF BOTH EYES: ICD-10-CM

## 2021-10-25 DIAGNOSIS — Z96.1 PSEUDOPHAKIA: ICD-10-CM

## 2021-10-25 DIAGNOSIS — G44.209 MUSCLE TENSION HEADACHE: ICD-10-CM

## 2021-10-25 LAB
LEFT EYE DM RETINOPATHY: POSITIVE
RIGHT EYE DM RETINOPATHY: POSITIVE

## 2021-10-25 PROCEDURE — 1160F RVW MEDS BY RX/DR IN RCRD: CPT | Mod: CPTII,S$GLB,, | Performed by: OPHTHALMOLOGY

## 2021-10-25 PROCEDURE — 92134 POSTERIOR SEGMENT OCT RETINA (OCULAR COHERENCE TOMOGRAPHY)-BOTH EYES: ICD-10-PCS | Mod: S$GLB,,, | Performed by: OPHTHALMOLOGY

## 2021-10-25 PROCEDURE — 99213 OFFICE O/P EST LOW 20 MIN: CPT | Mod: S$GLB,,, | Performed by: OPHTHALMOLOGY

## 2021-10-25 PROCEDURE — 99213 PR OFFICE/OUTPT VISIT, EST, LEVL III, 20-29 MIN: ICD-10-PCS | Mod: S$GLB,,, | Performed by: OPHTHALMOLOGY

## 2021-10-25 PROCEDURE — 3066F PR DOCUMENTATION OF TREATMENT FOR NEPHROPATHY: ICD-10-PCS | Mod: CPTII,S$GLB,, | Performed by: OPHTHALMOLOGY

## 2021-10-25 PROCEDURE — 1159F MED LIST DOCD IN RCRD: CPT | Mod: CPTII,S$GLB,, | Performed by: OPHTHALMOLOGY

## 2021-10-25 PROCEDURE — 4010F PR ACE/ARB THEARPY RXD/TAKEN: ICD-10-PCS | Mod: CPTII,S$GLB,, | Performed by: OPHTHALMOLOGY

## 2021-10-25 PROCEDURE — 1159F PR MEDICATION LIST DOCUMENTED IN MEDICAL RECORD: ICD-10-PCS | Mod: CPTII,S$GLB,, | Performed by: OPHTHALMOLOGY

## 2021-10-25 PROCEDURE — 3061F NEG MICROALBUMINURIA REV: CPT | Mod: CPTII,S$GLB,, | Performed by: OPHTHALMOLOGY

## 2021-10-25 PROCEDURE — 1160F PR REVIEW ALL MEDS BY PRESCRIBER/CLIN PHARMACIST DOCUMENTED: ICD-10-PCS | Mod: CPTII,S$GLB,, | Performed by: OPHTHALMOLOGY

## 2021-10-25 PROCEDURE — 3066F NEPHROPATHY DOC TX: CPT | Mod: CPTII,S$GLB,, | Performed by: OPHTHALMOLOGY

## 2021-10-25 PROCEDURE — 99999 PR PBB SHADOW E&M-EST. PATIENT-LVL III: ICD-10-PCS | Mod: PBBFAC,,, | Performed by: OPHTHALMOLOGY

## 2021-10-25 PROCEDURE — 92134 CPTRZ OPH DX IMG PST SGM RTA: CPT | Mod: S$GLB,,, | Performed by: OPHTHALMOLOGY

## 2021-10-25 PROCEDURE — 3044F PR MOST RECENT HEMOGLOBIN A1C LEVEL <7.0%: ICD-10-PCS | Mod: CPTII,S$GLB,, | Performed by: OPHTHALMOLOGY

## 2021-10-25 PROCEDURE — 1126F AMNT PAIN NOTED NONE PRSNT: CPT | Mod: CPTII,S$GLB,, | Performed by: OPHTHALMOLOGY

## 2021-10-25 PROCEDURE — 4010F ACE/ARB THERAPY RXD/TAKEN: CPT | Mod: CPTII,S$GLB,, | Performed by: OPHTHALMOLOGY

## 2021-10-25 PROCEDURE — 3061F PR NEG MICROALBUMINURIA RESULT DOCUMENTED/REVIEW: ICD-10-PCS | Mod: CPTII,S$GLB,, | Performed by: OPHTHALMOLOGY

## 2021-10-25 PROCEDURE — 1126F PR PAIN SEVERITY QUANTIFIED, NO PAIN PRESENT: ICD-10-PCS | Mod: CPTII,S$GLB,, | Performed by: OPHTHALMOLOGY

## 2021-10-25 PROCEDURE — 3044F HG A1C LEVEL LT 7.0%: CPT | Mod: CPTII,S$GLB,, | Performed by: OPHTHALMOLOGY

## 2021-10-25 PROCEDURE — 99999 PR PBB SHADOW E&M-EST. PATIENT-LVL III: CPT | Mod: PBBFAC,,, | Performed by: OPHTHALMOLOGY

## 2021-10-25 RX ORDER — NEOMYCIN SULFATE, POLYMYXIN B SULFATE AND DEXAMETHASONE 3.5; 10000; 1 MG/ML; [USP'U]/ML; MG/ML
1 SUSPENSION/ DROPS OPHTHALMIC 3 TIMES DAILY
Qty: 5 ML | Refills: 1 | Status: SHIPPED | OUTPATIENT
Start: 2021-10-25 | End: 2021-11-07

## 2021-10-29 ENCOUNTER — PATIENT OUTREACH (OUTPATIENT)
Dept: ADMINISTRATIVE | Facility: HOSPITAL | Age: 66
End: 2021-10-29
Payer: COMMERCIAL

## 2021-11-01 ENCOUNTER — PATIENT OUTREACH (OUTPATIENT)
Dept: ADMINISTRATIVE | Facility: HOSPITAL | Age: 66
End: 2021-11-01
Payer: COMMERCIAL

## 2021-11-02 ENCOUNTER — LAB VISIT (OUTPATIENT)
Dept: LAB | Facility: HOSPITAL | Age: 66
End: 2021-11-02
Attending: INTERNAL MEDICINE
Payer: COMMERCIAL

## 2021-11-02 DIAGNOSIS — I50.9 CONGESTIVE HEART FAILURE, UNSPECIFIED HF CHRONICITY, UNSPECIFIED HEART FAILURE TYPE: ICD-10-CM

## 2021-11-02 LAB
ANION GAP SERPL CALC-SCNC: 15 MMOL/L (ref 8–16)
BUN SERPL-MCNC: 51 MG/DL (ref 8–23)
CALCIUM SERPL-MCNC: 9.2 MG/DL (ref 8.7–10.5)
CHLORIDE SERPL-SCNC: 96 MMOL/L (ref 95–110)
CO2 SERPL-SCNC: 31 MMOL/L (ref 23–29)
CREAT SERPL-MCNC: 2.5 MG/DL (ref 0.5–1.4)
EST. GFR  (AFRICAN AMERICAN): 29.6 ML/MIN/1.73 M^2
EST. GFR  (NON AFRICAN AMERICAN): 25.6 ML/MIN/1.73 M^2
GLUCOSE SERPL-MCNC: 178 MG/DL (ref 70–110)
POTASSIUM SERPL-SCNC: 3.1 MMOL/L (ref 3.5–5.1)
SODIUM SERPL-SCNC: 142 MMOL/L (ref 136–145)

## 2021-11-02 PROCEDURE — 36415 COLL VENOUS BLD VENIPUNCTURE: CPT | Performed by: INTERNAL MEDICINE

## 2021-11-02 PROCEDURE — 80048 BASIC METABOLIC PNL TOTAL CA: CPT | Performed by: INTERNAL MEDICINE

## 2021-11-16 ENCOUNTER — TELEPHONE (OUTPATIENT)
Dept: INTERNAL MEDICINE | Facility: CLINIC | Age: 66
End: 2021-11-16
Payer: COMMERCIAL

## 2021-11-16 ENCOUNTER — TELEPHONE (OUTPATIENT)
Dept: ENDOSCOPY | Facility: HOSPITAL | Age: 66
End: 2021-11-16
Payer: COMMERCIAL

## 2021-11-16 DIAGNOSIS — Z12.11 SCREEN FOR COLON CANCER: Primary | ICD-10-CM

## 2021-11-23 ENCOUNTER — PATIENT MESSAGE (OUTPATIENT)
Dept: PHARMACY | Facility: CLINIC | Age: 66
End: 2021-11-23
Payer: COMMERCIAL

## 2021-11-29 RX ORDER — SACUBITRIL AND VALSARTAN 49; 51 MG/1; MG/1
1 TABLET, FILM COATED ORAL 2 TIMES DAILY
Qty: 60 TABLET | Refills: 3 | Status: SHIPPED | OUTPATIENT
Start: 2021-11-29 | End: 2021-12-29

## 2021-12-04 RX ORDER — INSULIN ASPART 100 [IU]/ML
25 INJECTION, SOLUTION INTRAVENOUS; SUBCUTANEOUS
Qty: 30 ML | Refills: 11 | Status: SHIPPED | OUTPATIENT
Start: 2021-12-04 | End: 2022-12-05 | Stop reason: SDUPTHER

## 2021-12-06 ENCOUNTER — HOSPITAL ENCOUNTER (OUTPATIENT)
Facility: HOSPITAL | Age: 66
Discharge: HOME | End: 2021-12-08
Attending: EMERGENCY MEDICINE | Admitting: FAMILY MEDICINE
Payer: COMMERCIAL

## 2021-12-06 DIAGNOSIS — E66.01 MORBID OBESITY WITH BMI OF 45.0-49.9, ADULT: ICD-10-CM

## 2021-12-06 DIAGNOSIS — I12.9 HYPERTENSION ASSOCIATED WITH STAGE 3 CHRONIC KIDNEY DISEASE DUE TO TYPE 2 DIABETES MELLITUS: ICD-10-CM

## 2021-12-06 DIAGNOSIS — I87.2 CHRONIC VENOUS INSUFFICIENCY OF LOWER EXTREMITY: ICD-10-CM

## 2021-12-06 DIAGNOSIS — N18.30 HYPERTENSION ASSOCIATED WITH STAGE 3 CHRONIC KIDNEY DISEASE DUE TO TYPE 2 DIABETES MELLITUS: ICD-10-CM

## 2021-12-06 DIAGNOSIS — R07.9 CHEST PAIN: Primary | ICD-10-CM

## 2021-12-06 DIAGNOSIS — E11.22 HYPERTENSION ASSOCIATED WITH STAGE 3 CHRONIC KIDNEY DISEASE DUE TO TYPE 2 DIABETES MELLITUS: ICD-10-CM

## 2021-12-06 DIAGNOSIS — Z94.0 KIDNEY TRANSPLANT STATUS, LIVING RELATED DONOR: Chronic | ICD-10-CM

## 2021-12-06 DIAGNOSIS — E11.3553 TYPE 2 DIABETES MELLITUS WITH STABLE PROLIFERATIVE RETINOPATHY OF BOTH EYES, WITH LONG-TERM CURRENT USE OF INSULIN: ICD-10-CM

## 2021-12-06 DIAGNOSIS — N17.9 AKI (ACUTE KIDNEY INJURY): ICD-10-CM

## 2021-12-06 DIAGNOSIS — I50.42 CHRONIC COMBINED SYSTOLIC AND DIASTOLIC CONGESTIVE HEART FAILURE: ICD-10-CM

## 2021-12-06 DIAGNOSIS — Z79.4 TYPE 2 DIABETES MELLITUS WITH STABLE PROLIFERATIVE RETINOPATHY OF BOTH EYES, WITH LONG-TERM CURRENT USE OF INSULIN: ICD-10-CM

## 2021-12-06 DIAGNOSIS — R79.89 ELEVATED TROPONIN: ICD-10-CM

## 2021-12-06 DIAGNOSIS — E11.21 TYPE 2 DIABETES MELLITUS WITH DIABETIC NEPHROPATHY, UNSPECIFIED WHETHER LONG TERM INSULIN USE: ICD-10-CM

## 2021-12-06 PROBLEM — R07.89 ATYPICAL CHEST PAIN: Status: ACTIVE | Noted: 2021-12-06

## 2021-12-06 LAB
ALBUMIN SERPL BCP-MCNC: 3.3 G/DL (ref 3.5–5.2)
ALP SERPL-CCNC: 58 U/L (ref 55–135)
ALT SERPL W/O P-5'-P-CCNC: 11 U/L (ref 10–44)
ANION GAP SERPL CALC-SCNC: 16 MMOL/L (ref 8–16)
AST SERPL-CCNC: 12 U/L (ref 10–40)
BASOPHILS # BLD AUTO: 0.02 K/UL (ref 0–0.2)
BASOPHILS NFR BLD: 0.3 % (ref 0–1.9)
BILIRUB SERPL-MCNC: 0.5 MG/DL (ref 0.1–1)
BILIRUB UR QL STRIP: NEGATIVE
BNP SERPL-MCNC: 37 PG/ML (ref 0–99)
BUN SERPL-MCNC: 60 MG/DL (ref 8–23)
CALCIUM SERPL-MCNC: 8.8 MG/DL (ref 8.7–10.5)
CHLORIDE SERPL-SCNC: 97 MMOL/L (ref 95–110)
CK SERPL-CCNC: 188 U/L (ref 20–200)
CLARITY UR: CLEAR
CO2 SERPL-SCNC: 29 MMOL/L (ref 23–29)
COLOR UR: YELLOW
CREAT SERPL-MCNC: 3.1 MG/DL (ref 0.5–1.4)
CREAT UR-MCNC: 90.3 MG/DL (ref 23–375)
DIFFERENTIAL METHOD: ABNORMAL
EOSINOPHIL # BLD AUTO: 0 K/UL (ref 0–0.5)
EOSINOPHIL NFR BLD: 0.5 % (ref 0–8)
ERYTHROCYTE [DISTWIDTH] IN BLOOD BY AUTOMATED COUNT: 12.6 % (ref 11.5–14.5)
EST. GFR  (AFRICAN AMERICAN): 23 ML/MIN/1.73 M^2
EST. GFR  (NON AFRICAN AMERICAN): 20 ML/MIN/1.73 M^2
GLUCOSE SERPL-MCNC: 276 MG/DL (ref 70–110)
GLUCOSE UR QL STRIP: NEGATIVE
HCT VFR BLD AUTO: 36.8 % (ref 40–54)
HCV AB SERPL QL IA: POSITIVE
HEP C VIRUS HOLD SPECIMEN: NORMAL
HGB BLD-MCNC: 11.1 G/DL (ref 14–18)
HGB UR QL STRIP: NEGATIVE
IMM GRANULOCYTES # BLD AUTO: 0.06 K/UL (ref 0–0.04)
IMM GRANULOCYTES NFR BLD AUTO: 0.8 % (ref 0–0.5)
KETONES UR QL STRIP: NEGATIVE
LEUKOCYTE ESTERASE UR QL STRIP: NEGATIVE
LYMPHOCYTES # BLD AUTO: 0.7 K/UL (ref 1–4.8)
LYMPHOCYTES NFR BLD: 9.8 % (ref 18–48)
MCH RBC QN AUTO: 25.8 PG (ref 27–31)
MCHC RBC AUTO-ENTMCNC: 30.2 G/DL (ref 32–36)
MCV RBC AUTO: 85 FL (ref 82–98)
MONOCYTES # BLD AUTO: 0.7 K/UL (ref 0.3–1)
MONOCYTES NFR BLD: 9.5 % (ref 4–15)
NEUTROPHILS # BLD AUTO: 5.9 K/UL (ref 1.8–7.7)
NEUTROPHILS NFR BLD: 79.1 % (ref 38–73)
NITRITE UR QL STRIP: NEGATIVE
NRBC BLD-RTO: 0 /100 WBC
PH UR STRIP: 7 [PH] (ref 5–8)
PLATELET # BLD AUTO: 158 K/UL (ref 150–450)
PMV BLD AUTO: 10.8 FL (ref 9.2–12.9)
POCT GLUCOSE: 272 MG/DL (ref 70–110)
POCT GLUCOSE: 321 MG/DL (ref 70–110)
POTASSIUM SERPL-SCNC: 3.3 MMOL/L (ref 3.5–5.1)
PROT SERPL-MCNC: 6.4 G/DL (ref 6–8.4)
PROT UR QL STRIP: NEGATIVE
RBC # BLD AUTO: 4.31 M/UL (ref 4.6–6.2)
SODIUM SERPL-SCNC: 142 MMOL/L (ref 136–145)
SODIUM UR-SCNC: 82 MMOL/L (ref 20–250)
SP GR UR STRIP: 1.01 (ref 1–1.03)
TROPONIN I SERPL DL<=0.01 NG/ML-MCNC: 0.04 NG/ML (ref 0–0.03)
URN SPEC COLLECT METH UR: NORMAL
UROBILINOGEN UR STRIP-ACNC: NEGATIVE EU/DL
WBC # BLD AUTO: 7.47 K/UL (ref 3.9–12.7)

## 2021-12-06 PROCEDURE — G0378 HOSPITAL OBSERVATION PER HR: HCPCS

## 2021-12-06 PROCEDURE — 84156 ASSAY OF PROTEIN URINE: CPT | Performed by: INTERNAL MEDICINE

## 2021-12-06 PROCEDURE — 85025 COMPLETE CBC W/AUTO DIFF WBC: CPT | Performed by: EMERGENCY MEDICINE

## 2021-12-06 PROCEDURE — 25000003 PHARM REV CODE 250: Performed by: EMERGENCY MEDICINE

## 2021-12-06 PROCEDURE — 87522 HEPATITIS C REVRS TRNSCRPJ: CPT | Performed by: EMERGENCY MEDICINE

## 2021-12-06 PROCEDURE — 93010 ELECTROCARDIOGRAM REPORT: CPT | Mod: ,,, | Performed by: INTERNAL MEDICINE

## 2021-12-06 PROCEDURE — 96372 THER/PROPH/DIAG INJ SC/IM: CPT | Mod: 59

## 2021-12-06 PROCEDURE — 99285 EMERGENCY DEPT VISIT HI MDM: CPT | Mod: 25

## 2021-12-06 PROCEDURE — 96360 HYDRATION IV INFUSION INIT: CPT

## 2021-12-06 PROCEDURE — 25000003 PHARM REV CODE 250: Performed by: STUDENT IN AN ORGANIZED HEALTH CARE EDUCATION/TRAINING PROGRAM

## 2021-12-06 PROCEDURE — 93005 ELECTROCARDIOGRAM TRACING: CPT

## 2021-12-06 PROCEDURE — 99215 OFFICE O/P EST HI 40 MIN: CPT | Mod: ,,, | Performed by: INTERNAL MEDICINE

## 2021-12-06 PROCEDURE — 96361 HYDRATE IV INFUSION ADD-ON: CPT

## 2021-12-06 PROCEDURE — 63600175 PHARM REV CODE 636 W HCPCS: Performed by: STUDENT IN AN ORGANIZED HEALTH CARE EDUCATION/TRAINING PROGRAM

## 2021-12-06 PROCEDURE — 84484 ASSAY OF TROPONIN QUANT: CPT | Performed by: EMERGENCY MEDICINE

## 2021-12-06 PROCEDURE — 80197 ASSAY OF TACROLIMUS: CPT | Performed by: NURSE PRACTITIONER

## 2021-12-06 PROCEDURE — 63600175 PHARM REV CODE 636 W HCPCS: Performed by: INTERNAL MEDICINE

## 2021-12-06 PROCEDURE — 84300 ASSAY OF URINE SODIUM: CPT | Performed by: INTERNAL MEDICINE

## 2021-12-06 PROCEDURE — 93010 EKG 12-LEAD: ICD-10-PCS | Mod: ,,, | Performed by: INTERNAL MEDICINE

## 2021-12-06 PROCEDURE — 82570 ASSAY OF URINE CREATININE: CPT | Performed by: INTERNAL MEDICINE

## 2021-12-06 PROCEDURE — 81003 URINALYSIS AUTO W/O SCOPE: CPT | Performed by: EMERGENCY MEDICINE

## 2021-12-06 PROCEDURE — 80053 COMPREHEN METABOLIC PANEL: CPT | Performed by: EMERGENCY MEDICINE

## 2021-12-06 PROCEDURE — 25000003 PHARM REV CODE 250: Performed by: INTERNAL MEDICINE

## 2021-12-06 PROCEDURE — 99215 PR OFFICE/OUTPT VISIT, EST, LEVL V, 40-54 MIN: ICD-10-PCS | Mod: ,,, | Performed by: INTERNAL MEDICINE

## 2021-12-06 PROCEDURE — C9399 UNCLASSIFIED DRUGS OR BIOLOG: HCPCS | Performed by: STUDENT IN AN ORGANIZED HEALTH CARE EDUCATION/TRAINING PROGRAM

## 2021-12-06 PROCEDURE — 86803 HEPATITIS C AB TEST: CPT | Performed by: EMERGENCY MEDICINE

## 2021-12-06 PROCEDURE — 36415 COLL VENOUS BLD VENIPUNCTURE: CPT | Performed by: EMERGENCY MEDICINE

## 2021-12-06 PROCEDURE — 82550 ASSAY OF CK (CPK): CPT | Performed by: EMERGENCY MEDICINE

## 2021-12-06 PROCEDURE — 83880 ASSAY OF NATRIURETIC PEPTIDE: CPT | Performed by: EMERGENCY MEDICINE

## 2021-12-06 RX ORDER — HYDRALAZINE HYDROCHLORIDE 50 MG/1
50 TABLET, FILM COATED ORAL 3 TIMES DAILY
Status: DISCONTINUED | OUTPATIENT
Start: 2021-12-06 | End: 2021-12-08 | Stop reason: HOSPADM

## 2021-12-06 RX ORDER — NALOXONE HCL 0.4 MG/ML
0.02 VIAL (ML) INJECTION
Status: DISCONTINUED | OUTPATIENT
Start: 2021-12-06 | End: 2021-12-08 | Stop reason: HOSPADM

## 2021-12-06 RX ORDER — ERGOCALCIFEROL 1.25 MG/1
50000 CAPSULE ORAL
Status: DISCONTINUED | OUTPATIENT
Start: 2021-12-06 | End: 2021-12-08 | Stop reason: HOSPADM

## 2021-12-06 RX ORDER — TAMSULOSIN HYDROCHLORIDE 0.4 MG/1
1 CAPSULE ORAL DAILY
Status: DISCONTINUED | OUTPATIENT
Start: 2021-12-06 | End: 2021-12-08 | Stop reason: HOSPADM

## 2021-12-06 RX ORDER — SODIUM CHLORIDE 0.9 % (FLUSH) 0.9 %
10 SYRINGE (ML) INJECTION EVERY 12 HOURS PRN
Status: DISCONTINUED | OUTPATIENT
Start: 2021-12-06 | End: 2021-12-08 | Stop reason: HOSPADM

## 2021-12-06 RX ORDER — SODIUM CHLORIDE 9 MG/ML
INJECTION, SOLUTION INTRAVENOUS CONTINUOUS
Status: DISCONTINUED | OUTPATIENT
Start: 2021-12-06 | End: 2021-12-08

## 2021-12-06 RX ORDER — FAMOTIDINE 20 MG/1
20 TABLET, FILM COATED ORAL DAILY
Status: DISCONTINUED | OUTPATIENT
Start: 2021-12-06 | End: 2021-12-08 | Stop reason: HOSPADM

## 2021-12-06 RX ORDER — METOPROLOL SUCCINATE 25 MG/1
25 TABLET, EXTENDED RELEASE ORAL DAILY
Status: DISCONTINUED | OUTPATIENT
Start: 2021-12-06 | End: 2021-12-08 | Stop reason: HOSPADM

## 2021-12-06 RX ORDER — CALCITRIOL 0.25 UG/1
0.25 CAPSULE ORAL DAILY
Status: DISCONTINUED | OUTPATIENT
Start: 2021-12-06 | End: 2021-12-08 | Stop reason: HOSPADM

## 2021-12-06 RX ORDER — TACROLIMUS 1 MG/1
4 CAPSULE ORAL 2 TIMES DAILY
Status: DISCONTINUED | OUTPATIENT
Start: 2021-12-06 | End: 2021-12-08

## 2021-12-06 RX ORDER — IBUPROFEN 200 MG
16 TABLET ORAL
Status: DISCONTINUED | OUTPATIENT
Start: 2021-12-06 | End: 2021-12-08 | Stop reason: HOSPADM

## 2021-12-06 RX ORDER — ONDANSETRON 2 MG/ML
4 INJECTION INTRAMUSCULAR; INTRAVENOUS EVERY 8 HOURS PRN
Status: DISCONTINUED | OUTPATIENT
Start: 2021-12-06 | End: 2021-12-08 | Stop reason: HOSPADM

## 2021-12-06 RX ORDER — ASPIRIN 81 MG/1
81 TABLET ORAL DAILY
Status: DISCONTINUED | OUTPATIENT
Start: 2021-12-06 | End: 2021-12-08 | Stop reason: HOSPADM

## 2021-12-06 RX ORDER — PREDNISONE 5 MG/1
5 TABLET ORAL DAILY
Status: DISCONTINUED | OUTPATIENT
Start: 2021-12-06 | End: 2021-12-08 | Stop reason: HOSPADM

## 2021-12-06 RX ORDER — LANOLIN ALCOHOL/MO/W.PET/CERES
400 CREAM (GRAM) TOPICAL DAILY
Status: DISCONTINUED | OUTPATIENT
Start: 2021-12-06 | End: 2021-12-08 | Stop reason: HOSPADM

## 2021-12-06 RX ORDER — IBUPROFEN 200 MG
24 TABLET ORAL
Status: DISCONTINUED | OUTPATIENT
Start: 2021-12-06 | End: 2021-12-08 | Stop reason: HOSPADM

## 2021-12-06 RX ORDER — FINASTERIDE 5 MG/1
5 TABLET, FILM COATED ORAL DAILY
Status: DISCONTINUED | OUTPATIENT
Start: 2021-12-06 | End: 2021-12-08 | Stop reason: HOSPADM

## 2021-12-06 RX ORDER — MYCOPHENOLATE MOFETIL 250 MG/1
750 CAPSULE ORAL 2 TIMES DAILY
Status: DISCONTINUED | OUTPATIENT
Start: 2021-12-06 | End: 2021-12-08 | Stop reason: HOSPADM

## 2021-12-06 RX ORDER — GLUCAGON 1 MG
1 KIT INJECTION
Status: DISCONTINUED | OUTPATIENT
Start: 2021-12-06 | End: 2021-12-08 | Stop reason: HOSPADM

## 2021-12-06 RX ORDER — INSULIN ASPART 100 [IU]/ML
1-10 INJECTION, SOLUTION INTRAVENOUS; SUBCUTANEOUS
Status: DISCONTINUED | OUTPATIENT
Start: 2021-12-06 | End: 2021-12-08 | Stop reason: HOSPADM

## 2021-12-06 RX ADMIN — SODIUM CHLORIDE 1000 ML: 0.9 INJECTION, SOLUTION INTRAVENOUS at 01:12

## 2021-12-06 RX ADMIN — SODIUM CHLORIDE: 0.9 INJECTION, SOLUTION INTRAVENOUS at 05:12

## 2021-12-06 RX ADMIN — INSULIN DETEMIR 15 UNITS: 100 INJECTION, SOLUTION SUBCUTANEOUS at 08:12

## 2021-12-06 RX ADMIN — INSULIN ASPART 8 UNITS: 100 INJECTION, SOLUTION INTRAVENOUS; SUBCUTANEOUS at 05:12

## 2021-12-06 RX ADMIN — MYCOPHENOLATE MOFETIL 750 MG: 250 CAPSULE ORAL at 08:12

## 2021-12-06 RX ADMIN — HYDRALAZINE HYDROCHLORIDE 50 MG: 50 TABLET ORAL at 04:12

## 2021-12-06 RX ADMIN — FINASTERIDE 5 MG: 5 TABLET, FILM COATED ORAL at 05:12

## 2021-12-06 RX ADMIN — INSULIN ASPART 3 UNITS: 100 INJECTION, SOLUTION INTRAVENOUS; SUBCUTANEOUS at 08:12

## 2021-12-06 RX ADMIN — APIXABAN 5 MG: 2.5 TABLET, FILM COATED ORAL at 08:12

## 2021-12-06 RX ADMIN — TACROLIMUS 4 MG: 1 CAPSULE ORAL at 05:12

## 2021-12-06 RX ADMIN — HYDRALAZINE HYDROCHLORIDE 50 MG: 50 TABLET ORAL at 08:12

## 2021-12-06 RX ADMIN — FAMOTIDINE 20 MG: 20 TABLET ORAL at 04:12

## 2021-12-07 LAB
ANION GAP SERPL CALC-SCNC: 13 MMOL/L (ref 8–16)
BUN SERPL-MCNC: 55 MG/DL (ref 8–23)
CALCIUM SERPL-MCNC: 8.6 MG/DL (ref 8.7–10.5)
CHLORIDE SERPL-SCNC: 103 MMOL/L (ref 95–110)
CO2 SERPL-SCNC: 26 MMOL/L (ref 23–29)
CREAT SERPL-MCNC: 2.7 MG/DL (ref 0.5–1.4)
CREAT UR-MCNC: 87 MG/DL (ref 23–375)
ERYTHROCYTE [DISTWIDTH] IN BLOOD BY AUTOMATED COUNT: 12.6 % (ref 11.5–14.5)
EST. GFR  (AFRICAN AMERICAN): 27 ML/MIN/1.73 M^2
EST. GFR  (NON AFRICAN AMERICAN): 23 ML/MIN/1.73 M^2
FERRITIN SERPL-MCNC: 565 NG/ML (ref 20–300)
GLUCOSE SERPL-MCNC: 153 MG/DL (ref 70–110)
HCT VFR BLD AUTO: 36.1 % (ref 40–54)
HGB BLD-MCNC: 10.6 G/DL (ref 14–18)
IRON SERPL-MCNC: 52 UG/DL (ref 45–160)
MCH RBC QN AUTO: 25.5 PG (ref 27–31)
MCHC RBC AUTO-ENTMCNC: 29.4 G/DL (ref 32–36)
MCV RBC AUTO: 87 FL (ref 82–98)
PLATELET # BLD AUTO: 156 K/UL (ref 150–450)
PMV BLD AUTO: 11 FL (ref 9.2–12.9)
POCT GLUCOSE: 161 MG/DL (ref 70–110)
POCT GLUCOSE: 186 MG/DL (ref 70–110)
POCT GLUCOSE: 295 MG/DL (ref 70–110)
POCT GLUCOSE: 304 MG/DL (ref 70–110)
POTASSIUM SERPL-SCNC: 3.4 MMOL/L (ref 3.5–5.1)
PROT UR-MCNC: <7 MG/DL (ref 0–15)
PROT/CREAT UR: NORMAL MG/G{CREAT} (ref 0–0.2)
RBC # BLD AUTO: 4.15 M/UL (ref 4.6–6.2)
SATURATED IRON: 22 % (ref 20–50)
SODIUM SERPL-SCNC: 142 MMOL/L (ref 136–145)
TACROLIMUS BLD-MCNC: 10.5 NG/ML (ref 5–15)
TOTAL IRON BINDING CAPACITY: 234 UG/DL (ref 250–450)
TRANSFERRIN SERPL-MCNC: 158 MG/DL (ref 200–375)
TROPONIN I SERPL DL<=0.01 NG/ML-MCNC: 0.04 NG/ML (ref 0–0.03)
WBC # BLD AUTO: 8.12 K/UL (ref 3.9–12.7)

## 2021-12-07 PROCEDURE — 36415 COLL VENOUS BLD VENIPUNCTURE: CPT | Performed by: FAMILY MEDICINE

## 2021-12-07 PROCEDURE — 99215 OFFICE O/P EST HI 40 MIN: CPT | Mod: ,,, | Performed by: INTERNAL MEDICINE

## 2021-12-07 PROCEDURE — 96372 THER/PROPH/DIAG INJ SC/IM: CPT | Mod: 59

## 2021-12-07 PROCEDURE — 84484 ASSAY OF TROPONIN QUANT: CPT | Performed by: STUDENT IN AN ORGANIZED HEALTH CARE EDUCATION/TRAINING PROGRAM

## 2021-12-07 PROCEDURE — 63600175 PHARM REV CODE 636 W HCPCS: Performed by: STUDENT IN AN ORGANIZED HEALTH CARE EDUCATION/TRAINING PROGRAM

## 2021-12-07 PROCEDURE — 25000003 PHARM REV CODE 250: Performed by: STUDENT IN AN ORGANIZED HEALTH CARE EDUCATION/TRAINING PROGRAM

## 2021-12-07 PROCEDURE — C9399 UNCLASSIFIED DRUGS OR BIOLOG: HCPCS | Performed by: STUDENT IN AN ORGANIZED HEALTH CARE EDUCATION/TRAINING PROGRAM

## 2021-12-07 PROCEDURE — 96361 HYDRATE IV INFUSION ADD-ON: CPT

## 2021-12-07 PROCEDURE — 84466 ASSAY OF TRANSFERRIN: CPT | Performed by: FAMILY MEDICINE

## 2021-12-07 PROCEDURE — 63600175 PHARM REV CODE 636 W HCPCS: Performed by: INTERNAL MEDICINE

## 2021-12-07 PROCEDURE — 82728 ASSAY OF FERRITIN: CPT | Performed by: FAMILY MEDICINE

## 2021-12-07 PROCEDURE — G0378 HOSPITAL OBSERVATION PER HR: HCPCS

## 2021-12-07 PROCEDURE — 99215 PR OFFICE/OUTPT VISIT, EST, LEVL V, 40-54 MIN: ICD-10-PCS | Mod: ,,, | Performed by: INTERNAL MEDICINE

## 2021-12-07 PROCEDURE — 36415 COLL VENOUS BLD VENIPUNCTURE: CPT | Performed by: STUDENT IN AN ORGANIZED HEALTH CARE EDUCATION/TRAINING PROGRAM

## 2021-12-07 PROCEDURE — 80197 ASSAY OF TACROLIMUS: CPT | Performed by: INTERNAL MEDICINE

## 2021-12-07 PROCEDURE — 25000003 PHARM REV CODE 250: Performed by: INTERNAL MEDICINE

## 2021-12-07 PROCEDURE — 80048 BASIC METABOLIC PNL TOTAL CA: CPT | Performed by: STUDENT IN AN ORGANIZED HEALTH CARE EDUCATION/TRAINING PROGRAM

## 2021-12-07 PROCEDURE — 85027 COMPLETE CBC AUTOMATED: CPT | Performed by: STUDENT IN AN ORGANIZED HEALTH CARE EDUCATION/TRAINING PROGRAM

## 2021-12-07 RX ORDER — INSULIN GLARGINE 100 [IU]/ML
35 INJECTION, SOLUTION SUBCUTANEOUS 2 TIMES DAILY
Qty: 30 ML | Refills: 11 | Status: ON HOLD | OUTPATIENT
Start: 2021-12-07 | End: 2022-12-08 | Stop reason: SDUPTHER

## 2021-12-07 RX ADMIN — TACROLIMUS 4 MG: 1 CAPSULE ORAL at 09:12

## 2021-12-07 RX ADMIN — INSULIN ASPART 8 UNITS: 100 INJECTION, SOLUTION INTRAVENOUS; SUBCUTANEOUS at 04:12

## 2021-12-07 RX ADMIN — MYCOPHENOLATE MOFETIL 750 MG: 250 CAPSULE ORAL at 09:12

## 2021-12-07 RX ADMIN — APIXABAN 5 MG: 2.5 TABLET, FILM COATED ORAL at 09:12

## 2021-12-07 RX ADMIN — INSULIN ASPART 3 UNITS: 100 INJECTION, SOLUTION INTRAVENOUS; SUBCUTANEOUS at 09:12

## 2021-12-07 RX ADMIN — INSULIN DETEMIR 15 UNITS: 100 INJECTION, SOLUTION SUBCUTANEOUS at 09:12

## 2021-12-07 RX ADMIN — Medication 400 MG: at 09:12

## 2021-12-07 RX ADMIN — HYDRALAZINE HYDROCHLORIDE 50 MG: 50 TABLET ORAL at 09:12

## 2021-12-07 RX ADMIN — FAMOTIDINE 20 MG: 20 TABLET ORAL at 09:12

## 2021-12-07 RX ADMIN — PREDNISONE 5 MG: 5 TABLET ORAL at 09:12

## 2021-12-07 RX ADMIN — SODIUM CHLORIDE: 0.9 INJECTION, SOLUTION INTRAVENOUS at 07:12

## 2021-12-07 RX ADMIN — TACROLIMUS 4 MG: 1 CAPSULE ORAL at 05:12

## 2021-12-07 RX ADMIN — INSULIN ASPART 2 UNITS: 100 INJECTION, SOLUTION INTRAVENOUS; SUBCUTANEOUS at 12:12

## 2021-12-07 RX ADMIN — HYDRALAZINE HYDROCHLORIDE 50 MG: 50 TABLET ORAL at 04:12

## 2021-12-07 RX ADMIN — TAMSULOSIN HYDROCHLORIDE 0.4 MG: 0.4 CAPSULE ORAL at 09:12

## 2021-12-07 RX ADMIN — FINASTERIDE 5 MG: 5 TABLET, FILM COATED ORAL at 09:12

## 2021-12-07 RX ADMIN — CALCITRIOL CAPSULES 0.25 MCG 0.25 MCG: 0.25 CAPSULE ORAL at 09:12

## 2021-12-07 RX ADMIN — METOPROLOL SUCCINATE 25 MG: 25 TABLET, EXTENDED RELEASE ORAL at 09:12

## 2021-12-07 RX ADMIN — ASPIRIN 81 MG: 81 TABLET, COATED ORAL at 09:12

## 2021-12-08 ENCOUNTER — TELEPHONE (OUTPATIENT)
Dept: NEPHROLOGY | Facility: CLINIC | Age: 66
End: 2021-12-08
Payer: COMMERCIAL

## 2021-12-08 VITALS
OXYGEN SATURATION: 96 % | HEART RATE: 96 BPM | WEIGHT: 287.94 LBS | BODY MASS INDEX: 45.19 KG/M2 | SYSTOLIC BLOOD PRESSURE: 144 MMHG | TEMPERATURE: 98 F | RESPIRATION RATE: 18 BRPM | HEIGHT: 67 IN | DIASTOLIC BLOOD PRESSURE: 82 MMHG

## 2021-12-08 PROBLEM — N17.9 AKI (ACUTE KIDNEY INJURY): Status: RESOLVED | Noted: 2021-12-06 | Resolved: 2021-12-08

## 2021-12-08 LAB
ANION GAP SERPL CALC-SCNC: 12 MMOL/L (ref 8–16)
BUN SERPL-MCNC: 45 MG/DL (ref 8–23)
CALCIUM SERPL-MCNC: 8.6 MG/DL (ref 8.7–10.5)
CHLORIDE SERPL-SCNC: 104 MMOL/L (ref 95–110)
CO2 SERPL-SCNC: 26 MMOL/L (ref 23–29)
CREAT SERPL-MCNC: 2.1 MG/DL (ref 0.5–1.4)
ERYTHROCYTE [DISTWIDTH] IN BLOOD BY AUTOMATED COUNT: 12.6 % (ref 11.5–14.5)
EST. GFR  (AFRICAN AMERICAN): 36 ML/MIN/1.73 M^2
EST. GFR  (NON AFRICAN AMERICAN): 31 ML/MIN/1.73 M^2
GLUCOSE SERPL-MCNC: 119 MG/DL (ref 70–110)
HCT VFR BLD AUTO: 36.2 % (ref 40–54)
HGB BLD-MCNC: 10.8 G/DL (ref 14–18)
MCH RBC QN AUTO: 25.7 PG (ref 27–31)
MCHC RBC AUTO-ENTMCNC: 29.8 G/DL (ref 32–36)
MCV RBC AUTO: 86 FL (ref 82–98)
PLATELET # BLD AUTO: 166 K/UL (ref 150–450)
PMV BLD AUTO: 11.2 FL (ref 9.2–12.9)
POCT GLUCOSE: 134 MG/DL (ref 70–110)
POCT GLUCOSE: 213 MG/DL (ref 70–110)
POTASSIUM SERPL-SCNC: 3.3 MMOL/L (ref 3.5–5.1)
RBC # BLD AUTO: 4.21 M/UL (ref 4.6–6.2)
SODIUM SERPL-SCNC: 142 MMOL/L (ref 136–145)
TACROLIMUS BLD-MCNC: 9 NG/ML (ref 5–15)
WBC # BLD AUTO: 7.23 K/UL (ref 3.9–12.7)

## 2021-12-08 PROCEDURE — 25000003 PHARM REV CODE 250: Performed by: FAMILY MEDICINE

## 2021-12-08 PROCEDURE — 63600175 PHARM REV CODE 636 W HCPCS: Performed by: INTERNAL MEDICINE

## 2021-12-08 PROCEDURE — 63600175 PHARM REV CODE 636 W HCPCS: Performed by: STUDENT IN AN ORGANIZED HEALTH CARE EDUCATION/TRAINING PROGRAM

## 2021-12-08 PROCEDURE — 36415 COLL VENOUS BLD VENIPUNCTURE: CPT | Performed by: STUDENT IN AN ORGANIZED HEALTH CARE EDUCATION/TRAINING PROGRAM

## 2021-12-08 PROCEDURE — 25000003 PHARM REV CODE 250: Performed by: STUDENT IN AN ORGANIZED HEALTH CARE EDUCATION/TRAINING PROGRAM

## 2021-12-08 PROCEDURE — 99214 OFFICE O/P EST MOD 30 MIN: CPT | Mod: ,,, | Performed by: INTERNAL MEDICINE

## 2021-12-08 PROCEDURE — 96372 THER/PROPH/DIAG INJ SC/IM: CPT | Mod: 59

## 2021-12-08 PROCEDURE — 85027 COMPLETE CBC AUTOMATED: CPT | Performed by: STUDENT IN AN ORGANIZED HEALTH CARE EDUCATION/TRAINING PROGRAM

## 2021-12-08 PROCEDURE — 80048 BASIC METABOLIC PNL TOTAL CA: CPT | Performed by: STUDENT IN AN ORGANIZED HEALTH CARE EDUCATION/TRAINING PROGRAM

## 2021-12-08 PROCEDURE — G0378 HOSPITAL OBSERVATION PER HR: HCPCS

## 2021-12-08 PROCEDURE — 96361 HYDRATE IV INFUSION ADD-ON: CPT

## 2021-12-08 PROCEDURE — 99214 PR OFFICE/OUTPT VISIT, EST, LEVL IV, 30-39 MIN: ICD-10-PCS | Mod: ,,, | Performed by: INTERNAL MEDICINE

## 2021-12-08 RX ORDER — TACROLIMUS 1 MG/1
3 CAPSULE ORAL NIGHTLY
Qty: 90 CAPSULE | Refills: 0 | Status: SHIPPED | OUTPATIENT
Start: 2021-12-08 | End: 2021-12-27 | Stop reason: SDUPTHER

## 2021-12-08 RX ORDER — DOCUSATE SODIUM 100 MG/1
100 CAPSULE, LIQUID FILLED ORAL ONCE
Status: COMPLETED | OUTPATIENT
Start: 2021-12-08 | End: 2021-12-08

## 2021-12-08 RX ORDER — TACROLIMUS 1 MG/1
3 CAPSULE ORAL EVERY EVENING
Status: DISCONTINUED | OUTPATIENT
Start: 2021-12-08 | End: 2021-12-08 | Stop reason: HOSPADM

## 2021-12-08 RX ORDER — TACROLIMUS 1 MG/1
4 CAPSULE ORAL EVERY MORNING
Qty: 120 CAPSULE | Refills: 11 | Status: SHIPPED | OUTPATIENT
Start: 2021-12-09 | End: 2021-12-27 | Stop reason: SDUPTHER

## 2021-12-08 RX ORDER — TACROLIMUS 1 MG/1
4 CAPSULE ORAL EVERY MORNING
Status: DISCONTINUED | OUTPATIENT
Start: 2021-12-08 | End: 2021-12-08 | Stop reason: HOSPADM

## 2021-12-08 RX ORDER — POLYETHYLENE GLYCOL 3350 17 G/17G
17 POWDER, FOR SOLUTION ORAL DAILY
Status: DISCONTINUED | OUTPATIENT
Start: 2021-12-08 | End: 2021-12-08 | Stop reason: HOSPADM

## 2021-12-08 RX ADMIN — TAMSULOSIN HYDROCHLORIDE 0.4 MG: 0.4 CAPSULE ORAL at 09:12

## 2021-12-08 RX ADMIN — FAMOTIDINE 20 MG: 20 TABLET ORAL at 09:12

## 2021-12-08 RX ADMIN — MYCOPHENOLATE MOFETIL 750 MG: 250 CAPSULE ORAL at 09:12

## 2021-12-08 RX ADMIN — FINASTERIDE 5 MG: 5 TABLET, FILM COATED ORAL at 09:12

## 2021-12-08 RX ADMIN — METOPROLOL SUCCINATE 25 MG: 25 TABLET, EXTENDED RELEASE ORAL at 09:12

## 2021-12-08 RX ADMIN — ASPIRIN 81 MG: 81 TABLET, COATED ORAL at 09:12

## 2021-12-08 RX ADMIN — POLYETHYLENE GLYCOL 3350 17 G: 17 POWDER, FOR SOLUTION ORAL at 12:12

## 2021-12-08 RX ADMIN — PREDNISONE 5 MG: 5 TABLET ORAL at 09:12

## 2021-12-08 RX ADMIN — HYDRALAZINE HYDROCHLORIDE 50 MG: 50 TABLET ORAL at 09:12

## 2021-12-08 RX ADMIN — DOCUSATE SODIUM 100 MG: 100 CAPSULE, LIQUID FILLED ORAL at 12:12

## 2021-12-08 RX ADMIN — Medication 400 MG: at 09:12

## 2021-12-08 RX ADMIN — INSULIN ASPART 4 UNITS: 100 INJECTION, SOLUTION INTRAVENOUS; SUBCUTANEOUS at 12:12

## 2021-12-08 RX ADMIN — APIXABAN 5 MG: 2.5 TABLET, FILM COATED ORAL at 09:12

## 2021-12-08 RX ADMIN — CALCITRIOL CAPSULES 0.25 MCG 0.25 MCG: 0.25 CAPSULE ORAL at 09:12

## 2021-12-08 RX ADMIN — TACROLIMUS 4 MG: 1 CAPSULE ORAL at 09:12

## 2021-12-09 LAB — HEPATITIS C VIRUS (HCV) RNA DETECTION/QUANTIFICATION RT-PCR: NORMAL IU/ML

## 2021-12-15 ENCOUNTER — PATIENT OUTREACH (OUTPATIENT)
Dept: ADMINISTRATIVE | Facility: HOSPITAL | Age: 66
End: 2021-12-15
Payer: COMMERCIAL

## 2021-12-15 ENCOUNTER — TELEPHONE (OUTPATIENT)
Dept: TRANSPLANT | Facility: CLINIC | Age: 66
End: 2021-12-15
Payer: COMMERCIAL

## 2021-12-15 DIAGNOSIS — I50.9 CONGESTIVE HEART FAILURE, UNSPECIFIED HF CHRONICITY, UNSPECIFIED HEART FAILURE TYPE: Primary | ICD-10-CM

## 2021-12-17 ENCOUNTER — IMMUNIZATION (OUTPATIENT)
Dept: PRIMARY CARE CLINIC | Facility: CLINIC | Age: 66
End: 2021-12-17
Payer: COMMERCIAL

## 2021-12-17 ENCOUNTER — LAB VISIT (OUTPATIENT)
Dept: LAB | Facility: HOSPITAL | Age: 66
End: 2021-12-17
Attending: INTERNAL MEDICINE
Payer: COMMERCIAL

## 2021-12-17 ENCOUNTER — OFFICE VISIT (OUTPATIENT)
Dept: INTERNAL MEDICINE | Facility: CLINIC | Age: 66
End: 2021-12-17
Payer: COMMERCIAL

## 2021-12-17 VITALS
TEMPERATURE: 98 F | BODY MASS INDEX: 44.74 KG/M2 | SYSTOLIC BLOOD PRESSURE: 116 MMHG | OXYGEN SATURATION: 99 % | DIASTOLIC BLOOD PRESSURE: 72 MMHG | HEART RATE: 98 BPM | HEIGHT: 67 IN | WEIGHT: 285.06 LBS

## 2021-12-17 DIAGNOSIS — N18.9 ANEMIA ASSOCIATED WITH CHRONIC RENAL FAILURE: ICD-10-CM

## 2021-12-17 DIAGNOSIS — E11.22 HYPERTENSION ASSOCIATED WITH STAGE 3 CHRONIC KIDNEY DISEASE DUE TO TYPE 2 DIABETES MELLITUS: ICD-10-CM

## 2021-12-17 DIAGNOSIS — Z86.010 HISTORY OF COLON POLYPS: ICD-10-CM

## 2021-12-17 DIAGNOSIS — E66.01 MORBID OBESITY WITH BMI OF 45.0-49.9, ADULT: ICD-10-CM

## 2021-12-17 DIAGNOSIS — I25.10 CORONARY ARTERY DISEASE INVOLVING NATIVE CORONARY ARTERY OF NATIVE HEART WITHOUT ANGINA PECTORIS: ICD-10-CM

## 2021-12-17 DIAGNOSIS — D63.1 ANEMIA ASSOCIATED WITH CHRONIC RENAL FAILURE: ICD-10-CM

## 2021-12-17 DIAGNOSIS — E11.21 TYPE 2 DIABETES MELLITUS WITH DIABETIC NEPHROPATHY, UNSPECIFIED WHETHER LONG TERM INSULIN USE: Primary | ICD-10-CM

## 2021-12-17 DIAGNOSIS — I50.42 CHRONIC COMBINED SYSTOLIC AND DIASTOLIC CONGESTIVE HEART FAILURE: ICD-10-CM

## 2021-12-17 DIAGNOSIS — E11.42 DIABETIC PERIPHERAL NEUROPATHY: ICD-10-CM

## 2021-12-17 DIAGNOSIS — E11.8 DM (DIABETES MELLITUS), TYPE 2 WITH COMPLICATIONS: ICD-10-CM

## 2021-12-17 DIAGNOSIS — I87.2 CHRONIC VENOUS INSUFFICIENCY OF LOWER EXTREMITY: ICD-10-CM

## 2021-12-17 DIAGNOSIS — Z79.4 TYPE 2 DIABETES MELLITUS WITH STABLE PROLIFERATIVE RETINOPATHY OF BOTH EYES, WITH LONG-TERM CURRENT USE OF INSULIN: ICD-10-CM

## 2021-12-17 DIAGNOSIS — N18.30 HYPERTENSION ASSOCIATED WITH STAGE 3 CHRONIC KIDNEY DISEASE DUE TO TYPE 2 DIABETES MELLITUS: ICD-10-CM

## 2021-12-17 DIAGNOSIS — E11.3553 TYPE 2 DIABETES MELLITUS WITH STABLE PROLIFERATIVE RETINOPATHY OF BOTH EYES, WITH LONG-TERM CURRENT USE OF INSULIN: ICD-10-CM

## 2021-12-17 DIAGNOSIS — Z23 NEED FOR VACCINATION: Primary | ICD-10-CM

## 2021-12-17 DIAGNOSIS — N18.31 STAGE 3A CHRONIC KIDNEY DISEASE: ICD-10-CM

## 2021-12-17 DIAGNOSIS — I12.9 HYPERTENSION ASSOCIATED WITH STAGE 3 CHRONIC KIDNEY DISEASE DUE TO TYPE 2 DIABETES MELLITUS: ICD-10-CM

## 2021-12-17 LAB
ANION GAP SERPL CALC-SCNC: 16 MMOL/L (ref 8–16)
BNP SERPL-MCNC: 28 PG/ML (ref 0–99)
BUN SERPL-MCNC: 67 MG/DL (ref 8–23)
CALCIUM SERPL-MCNC: 9.3 MG/DL (ref 8.7–10.5)
CHLORIDE SERPL-SCNC: 96 MMOL/L (ref 95–110)
CO2 SERPL-SCNC: 29 MMOL/L (ref 23–29)
CREAT SERPL-MCNC: 2.7 MG/DL (ref 0.5–1.4)
EST. GFR  (AFRICAN AMERICAN): 26.8 ML/MIN/1.73 M^2
EST. GFR  (NON AFRICAN AMERICAN): 23.2 ML/MIN/1.73 M^2
ESTIMATED AVG GLUCOSE: 169 MG/DL (ref 68–131)
GLUCOSE SERPL-MCNC: 98 MG/DL (ref 70–110)
HBA1C MFR BLD: 7.5 % (ref 4–5.6)
POTASSIUM SERPL-SCNC: 3.2 MMOL/L (ref 3.5–5.1)
SODIUM SERPL-SCNC: 141 MMOL/L (ref 136–145)

## 2021-12-17 PROCEDURE — 3061F PR NEG MICROALBUMINURIA RESULT DOCUMENTED/REVIEW: ICD-10-PCS | Mod: CPTII,S$GLB,, | Performed by: INTERNAL MEDICINE

## 2021-12-17 PROCEDURE — 80048 BASIC METABOLIC PNL TOTAL CA: CPT | Performed by: INTERNAL MEDICINE

## 2021-12-17 PROCEDURE — 0064A COVID-19, MRNA, LNP-S, PF, 100 MCG/0.25 ML DOSE VACCINE (MODERNA BOOSTER): CPT | Mod: CV19,PBBFAC | Performed by: FAMILY MEDICINE

## 2021-12-17 PROCEDURE — 3061F NEG MICROALBUMINURIA REV: CPT | Mod: CPTII,S$GLB,, | Performed by: INTERNAL MEDICINE

## 2021-12-17 PROCEDURE — 83036 HEMOGLOBIN GLYCOSYLATED A1C: CPT | Performed by: INTERNAL MEDICINE

## 2021-12-17 PROCEDURE — 4010F PR ACE/ARB THEARPY RXD/TAKEN: ICD-10-PCS | Mod: CPTII,S$GLB,, | Performed by: INTERNAL MEDICINE

## 2021-12-17 PROCEDURE — 99214 OFFICE O/P EST MOD 30 MIN: CPT | Mod: S$GLB,,, | Performed by: INTERNAL MEDICINE

## 2021-12-17 PROCEDURE — 99999 PR PBB SHADOW E&M-EST. PATIENT-LVL V: ICD-10-PCS | Mod: PBBFAC,,, | Performed by: INTERNAL MEDICINE

## 2021-12-17 PROCEDURE — 3066F PR DOCUMENTATION OF TREATMENT FOR NEPHROPATHY: ICD-10-PCS | Mod: CPTII,S$GLB,, | Performed by: INTERNAL MEDICINE

## 2021-12-17 PROCEDURE — 99214 PR OFFICE/OUTPT VISIT, EST, LEVL IV, 30-39 MIN: ICD-10-PCS | Mod: S$GLB,,, | Performed by: INTERNAL MEDICINE

## 2021-12-17 PROCEDURE — 36415 COLL VENOUS BLD VENIPUNCTURE: CPT | Mod: PO | Performed by: INTERNAL MEDICINE

## 2021-12-17 PROCEDURE — 4010F ACE/ARB THERAPY RXD/TAKEN: CPT | Mod: CPTII,S$GLB,, | Performed by: INTERNAL MEDICINE

## 2021-12-17 PROCEDURE — 3066F NEPHROPATHY DOC TX: CPT | Mod: CPTII,S$GLB,, | Performed by: INTERNAL MEDICINE

## 2021-12-17 PROCEDURE — 99999 PR PBB SHADOW E&M-EST. PATIENT-LVL V: CPT | Mod: PBBFAC,,, | Performed by: INTERNAL MEDICINE

## 2021-12-17 PROCEDURE — 83880 ASSAY OF NATRIURETIC PEPTIDE: CPT | Performed by: INTERNAL MEDICINE

## 2021-12-20 ENCOUNTER — LAB VISIT (OUTPATIENT)
Dept: LAB | Facility: HOSPITAL | Age: 66
End: 2021-12-20
Attending: INTERNAL MEDICINE
Payer: COMMERCIAL

## 2021-12-20 ENCOUNTER — OFFICE VISIT (OUTPATIENT)
Dept: TRANSPLANT | Facility: CLINIC | Age: 66
End: 2021-12-20
Attending: INTERNAL MEDICINE
Payer: COMMERCIAL

## 2021-12-20 VITALS
HEART RATE: 97 BPM | BODY MASS INDEX: 44.95 KG/M2 | SYSTOLIC BLOOD PRESSURE: 108 MMHG | HEIGHT: 67 IN | DIASTOLIC BLOOD PRESSURE: 55 MMHG | WEIGHT: 286.38 LBS

## 2021-12-20 DIAGNOSIS — Z29.89 PROPHYLACTIC IMMUNOTHERAPY: Chronic | ICD-10-CM

## 2021-12-20 DIAGNOSIS — I42.8 INFILTRATIVE CARDIOMYOPATHY: ICD-10-CM

## 2021-12-20 DIAGNOSIS — I12.9 HYPERTENSION ASSOCIATED WITH STAGE 3 CHRONIC KIDNEY DISEASE DUE TO TYPE 2 DIABETES MELLITUS: ICD-10-CM

## 2021-12-20 DIAGNOSIS — E11.42 DIABETIC PERIPHERAL NEUROPATHY: ICD-10-CM

## 2021-12-20 DIAGNOSIS — E66.01 MORBID OBESITY WITH BMI OF 40.0-44.9, ADULT: ICD-10-CM

## 2021-12-20 DIAGNOSIS — N18.30 HYPERTENSION ASSOCIATED WITH STAGE 3 CHRONIC KIDNEY DISEASE DUE TO TYPE 2 DIABETES MELLITUS: ICD-10-CM

## 2021-12-20 DIAGNOSIS — E11.22 HYPERTENSION ASSOCIATED WITH STAGE 3 CHRONIC KIDNEY DISEASE DUE TO TYPE 2 DIABETES MELLITUS: ICD-10-CM

## 2021-12-20 DIAGNOSIS — I44.7 LBBB (LEFT BUNDLE BRANCH BLOCK): ICD-10-CM

## 2021-12-20 DIAGNOSIS — I50.42 CHRONIC COMBINED SYSTOLIC AND DIASTOLIC CONGESTIVE HEART FAILURE: Primary | ICD-10-CM

## 2021-12-20 DIAGNOSIS — E11.3553 TYPE 2 DIABETES MELLITUS WITH STABLE PROLIFERATIVE RETINOPATHY OF BOTH EYES, WITH LONG-TERM CURRENT USE OF INSULIN: ICD-10-CM

## 2021-12-20 DIAGNOSIS — Z79.4 TYPE 2 DIABETES MELLITUS WITH STABLE PROLIFERATIVE RETINOPATHY OF BOTH EYES, WITH LONG-TERM CURRENT USE OF INSULIN: ICD-10-CM

## 2021-12-20 DIAGNOSIS — I50.9 CONGESTIVE HEART FAILURE, UNSPECIFIED HF CHRONICITY, UNSPECIFIED HEART FAILURE TYPE: ICD-10-CM

## 2021-12-20 DIAGNOSIS — Z94.0 KIDNEY TRANSPLANT STATUS, LIVING RELATED DONOR: Chronic | ICD-10-CM

## 2021-12-20 LAB
BASOPHILS # BLD AUTO: 0.02 K/UL (ref 0–0.2)
BASOPHILS NFR BLD: 0.2 % (ref 0–1.9)
BNP SERPL-MCNC: 52 PG/ML (ref 0–99)
DIFFERENTIAL METHOD: ABNORMAL
EOSINOPHIL # BLD AUTO: 0 K/UL (ref 0–0.5)
EOSINOPHIL NFR BLD: 0.5 % (ref 0–8)
ERYTHROCYTE [DISTWIDTH] IN BLOOD BY AUTOMATED COUNT: 12.9 % (ref 11.5–14.5)
HCT VFR BLD AUTO: 37.4 % (ref 40–54)
HGB BLD-MCNC: 10.9 G/DL (ref 14–18)
IMM GRANULOCYTES # BLD AUTO: 0.06 K/UL (ref 0–0.04)
IMM GRANULOCYTES NFR BLD AUTO: 0.7 % (ref 0–0.5)
LYMPHOCYTES # BLD AUTO: 0.9 K/UL (ref 1–4.8)
LYMPHOCYTES NFR BLD: 11 % (ref 18–48)
MAGNESIUM SERPL-MCNC: 2.3 MG/DL (ref 1.6–2.6)
MCH RBC QN AUTO: 25.4 PG (ref 27–31)
MCHC RBC AUTO-ENTMCNC: 29.1 G/DL (ref 32–36)
MCV RBC AUTO: 87 FL (ref 82–98)
MONOCYTES # BLD AUTO: 0.8 K/UL (ref 0.3–1)
MONOCYTES NFR BLD: 9.5 % (ref 4–15)
NEUTROPHILS # BLD AUTO: 6.5 K/UL (ref 1.8–7.7)
NEUTROPHILS NFR BLD: 78.1 % (ref 38–73)
NRBC BLD-RTO: 0 /100 WBC
PLATELET # BLD AUTO: 189 K/UL (ref 150–450)
PMV BLD AUTO: 10.9 FL (ref 9.2–12.9)
RBC # BLD AUTO: 4.29 M/UL (ref 4.6–6.2)
TSH SERPL DL<=0.005 MIU/L-ACNC: 0.64 UIU/ML (ref 0.4–4)
WBC # BLD AUTO: 8.3 K/UL (ref 3.9–12.7)

## 2021-12-20 PROCEDURE — 83880 ASSAY OF NATRIURETIC PEPTIDE: CPT | Performed by: INTERNAL MEDICINE

## 2021-12-20 PROCEDURE — 3066F PR DOCUMENTATION OF TREATMENT FOR NEPHROPATHY: ICD-10-PCS | Mod: CPTII,S$GLB,, | Performed by: INTERNAL MEDICINE

## 2021-12-20 PROCEDURE — 4010F ACE/ARB THERAPY RXD/TAKEN: CPT | Mod: CPTII,S$GLB,, | Performed by: INTERNAL MEDICINE

## 2021-12-20 PROCEDURE — 3066F NEPHROPATHY DOC TX: CPT | Mod: CPTII,S$GLB,, | Performed by: INTERNAL MEDICINE

## 2021-12-20 PROCEDURE — 36415 COLL VENOUS BLD VENIPUNCTURE: CPT | Performed by: INTERNAL MEDICINE

## 2021-12-20 PROCEDURE — 83735 ASSAY OF MAGNESIUM: CPT | Performed by: INTERNAL MEDICINE

## 2021-12-20 PROCEDURE — 3061F NEG MICROALBUMINURIA REV: CPT | Mod: CPTII,S$GLB,, | Performed by: INTERNAL MEDICINE

## 2021-12-20 PROCEDURE — 4010F PR ACE/ARB THEARPY RXD/TAKEN: ICD-10-PCS | Mod: CPTII,S$GLB,, | Performed by: INTERNAL MEDICINE

## 2021-12-20 PROCEDURE — 84443 ASSAY THYROID STIM HORMONE: CPT | Performed by: INTERNAL MEDICINE

## 2021-12-20 PROCEDURE — 99999 PR PBB SHADOW E&M-EST. PATIENT-LVL V: CPT | Mod: PBBFAC,,, | Performed by: INTERNAL MEDICINE

## 2021-12-20 PROCEDURE — 99999 PR PBB SHADOW E&M-EST. PATIENT-LVL V: ICD-10-PCS | Mod: PBBFAC,,, | Performed by: INTERNAL MEDICINE

## 2021-12-20 PROCEDURE — 85025 COMPLETE CBC W/AUTO DIFF WBC: CPT | Performed by: INTERNAL MEDICINE

## 2021-12-20 PROCEDURE — 99204 PR OFFICE/OUTPT VISIT, NEW, LEVL IV, 45-59 MIN: ICD-10-PCS | Mod: S$GLB,,, | Performed by: INTERNAL MEDICINE

## 2021-12-20 PROCEDURE — 99204 OFFICE O/P NEW MOD 45 MIN: CPT | Mod: S$GLB,,, | Performed by: INTERNAL MEDICINE

## 2021-12-20 PROCEDURE — 3061F PR NEG MICROALBUMINURIA RESULT DOCUMENTED/REVIEW: ICD-10-PCS | Mod: CPTII,S$GLB,, | Performed by: INTERNAL MEDICINE

## 2021-12-27 DIAGNOSIS — Z94.0 KIDNEY REPLACED BY TRANSPLANT: Primary | ICD-10-CM

## 2021-12-27 RX ORDER — TACROLIMUS 1 MG/1
CAPSULE ORAL
Qty: 210 CAPSULE | Refills: 11 | Status: SHIPPED | OUTPATIENT
Start: 2021-12-27 | End: 2022-03-30

## 2021-12-29 ENCOUNTER — OFFICE VISIT (OUTPATIENT)
Dept: OTOLARYNGOLOGY | Facility: CLINIC | Age: 66
End: 2021-12-29
Payer: COMMERCIAL

## 2021-12-29 ENCOUNTER — OFFICE VISIT (OUTPATIENT)
Dept: CARDIOLOGY | Facility: CLINIC | Age: 66
End: 2021-12-29
Payer: COMMERCIAL

## 2021-12-29 VITALS
HEIGHT: 67 IN | OXYGEN SATURATION: 98 % | WEIGHT: 282.63 LBS | DIASTOLIC BLOOD PRESSURE: 60 MMHG | SYSTOLIC BLOOD PRESSURE: 104 MMHG | HEART RATE: 94 BPM | BODY MASS INDEX: 44.36 KG/M2

## 2021-12-29 VITALS — WEIGHT: 282.63 LBS | TEMPERATURE: 98 F | BODY MASS INDEX: 44.27 KG/M2

## 2021-12-29 DIAGNOSIS — H91.90 PERCEIVED HEARING LOSS: Primary | ICD-10-CM

## 2021-12-29 DIAGNOSIS — H93.13 TINNITUS OF BOTH EARS: ICD-10-CM

## 2021-12-29 DIAGNOSIS — E11.22 HYPERTENSION ASSOCIATED WITH STAGE 3 CHRONIC KIDNEY DISEASE DUE TO TYPE 2 DIABETES MELLITUS: ICD-10-CM

## 2021-12-29 DIAGNOSIS — I12.9 HYPERTENSION ASSOCIATED WITH STAGE 3 CHRONIC KIDNEY DISEASE DUE TO TYPE 2 DIABETES MELLITUS: ICD-10-CM

## 2021-12-29 DIAGNOSIS — I87.2 CHRONIC VENOUS INSUFFICIENCY OF LOWER EXTREMITY: ICD-10-CM

## 2021-12-29 DIAGNOSIS — E11.3553 TYPE 2 DIABETES MELLITUS WITH STABLE PROLIFERATIVE RETINOPATHY OF BOTH EYES, WITH LONG-TERM CURRENT USE OF INSULIN: ICD-10-CM

## 2021-12-29 DIAGNOSIS — Z94.0 KIDNEY TRANSPLANT STATUS, LIVING RELATED DONOR: ICD-10-CM

## 2021-12-29 DIAGNOSIS — I25.10 CORONARY ARTERY DISEASE INVOLVING NATIVE CORONARY ARTERY OF NATIVE HEART WITHOUT ANGINA PECTORIS: ICD-10-CM

## 2021-12-29 DIAGNOSIS — Z79.4 TYPE 2 DIABETES MELLITUS WITH STABLE PROLIFERATIVE RETINOPATHY OF BOTH EYES, WITH LONG-TERM CURRENT USE OF INSULIN: ICD-10-CM

## 2021-12-29 DIAGNOSIS — E11.21 TYPE 2 DIABETES MELLITUS WITH DIABETIC NEPHROPATHY, UNSPECIFIED WHETHER LONG TERM INSULIN USE: ICD-10-CM

## 2021-12-29 DIAGNOSIS — N18.30 HYPERTENSION ASSOCIATED WITH STAGE 3 CHRONIC KIDNEY DISEASE DUE TO TYPE 2 DIABETES MELLITUS: ICD-10-CM

## 2021-12-29 DIAGNOSIS — E66.01 MORBID OBESITY WITH BMI OF 45.0-49.9, ADULT: ICD-10-CM

## 2021-12-29 DIAGNOSIS — I50.42 CHRONIC COMBINED SYSTOLIC AND DIASTOLIC CONGESTIVE HEART FAILURE: Primary | ICD-10-CM

## 2021-12-29 PROCEDURE — 99999 PR PBB SHADOW E&M-EST. PATIENT-LVL III: ICD-10-PCS | Mod: PBBFAC,,, | Performed by: INTERNAL MEDICINE

## 2021-12-29 PROCEDURE — 4010F PR ACE/ARB THEARPY RXD/TAKEN: ICD-10-PCS | Mod: CPTII,S$GLB,, | Performed by: PHYSICIAN ASSISTANT

## 2021-12-29 PROCEDURE — 3051F HG A1C>EQUAL 7.0%<8.0%: CPT | Mod: CPTII,S$GLB,, | Performed by: INTERNAL MEDICINE

## 2021-12-29 PROCEDURE — 99999 PR PBB SHADOW E&M-EST. PATIENT-LVL IV: CPT | Mod: PBBFAC,,, | Performed by: PHYSICIAN ASSISTANT

## 2021-12-29 PROCEDURE — 3066F NEPHROPATHY DOC TX: CPT | Mod: CPTII,S$GLB,, | Performed by: PHYSICIAN ASSISTANT

## 2021-12-29 PROCEDURE — 99203 PR OFFICE/OUTPT VISIT, NEW, LEVL III, 30-44 MIN: ICD-10-PCS | Mod: S$GLB,,, | Performed by: PHYSICIAN ASSISTANT

## 2021-12-29 PROCEDURE — 3008F BODY MASS INDEX DOCD: CPT | Mod: CPTII,S$GLB,, | Performed by: PHYSICIAN ASSISTANT

## 2021-12-29 PROCEDURE — 3061F NEG MICROALBUMINURIA REV: CPT | Mod: CPTII,S$GLB,, | Performed by: INTERNAL MEDICINE

## 2021-12-29 PROCEDURE — 3078F DIAST BP <80 MM HG: CPT | Mod: CPTII,S$GLB,, | Performed by: INTERNAL MEDICINE

## 2021-12-29 PROCEDURE — 99203 OFFICE O/P NEW LOW 30 MIN: CPT | Mod: S$GLB,,, | Performed by: PHYSICIAN ASSISTANT

## 2021-12-29 PROCEDURE — 3066F NEPHROPATHY DOC TX: CPT | Mod: CPTII,S$GLB,, | Performed by: INTERNAL MEDICINE

## 2021-12-29 PROCEDURE — 99999 PR PBB SHADOW E&M-EST. PATIENT-LVL IV: ICD-10-PCS | Mod: PBBFAC,,, | Performed by: PHYSICIAN ASSISTANT

## 2021-12-29 PROCEDURE — 3051F HG A1C>EQUAL 7.0%<8.0%: CPT | Mod: CPTII,S$GLB,, | Performed by: PHYSICIAN ASSISTANT

## 2021-12-29 PROCEDURE — 4010F PR ACE/ARB THEARPY RXD/TAKEN: ICD-10-PCS | Mod: CPTII,S$GLB,, | Performed by: INTERNAL MEDICINE

## 2021-12-29 PROCEDURE — 3008F PR BODY MASS INDEX (BMI) DOCUMENTED: ICD-10-PCS | Mod: CPTII,S$GLB,, | Performed by: INTERNAL MEDICINE

## 2021-12-29 PROCEDURE — 3074F PR MOST RECENT SYSTOLIC BLOOD PRESSURE < 130 MM HG: ICD-10-PCS | Mod: CPTII,S$GLB,, | Performed by: INTERNAL MEDICINE

## 2021-12-29 PROCEDURE — 99215 OFFICE O/P EST HI 40 MIN: CPT | Mod: S$GLB,,, | Performed by: INTERNAL MEDICINE

## 2021-12-29 PROCEDURE — 3066F PR DOCUMENTATION OF TREATMENT FOR NEPHROPATHY: ICD-10-PCS | Mod: CPTII,S$GLB,, | Performed by: PHYSICIAN ASSISTANT

## 2021-12-29 PROCEDURE — 3074F SYST BP LT 130 MM HG: CPT | Mod: CPTII,S$GLB,, | Performed by: INTERNAL MEDICINE

## 2021-12-29 PROCEDURE — 99999 PR PBB SHADOW E&M-EST. PATIENT-LVL III: CPT | Mod: PBBFAC,,, | Performed by: INTERNAL MEDICINE

## 2021-12-29 PROCEDURE — 3078F PR MOST RECENT DIASTOLIC BLOOD PRESSURE < 80 MM HG: ICD-10-PCS | Mod: CPTII,S$GLB,, | Performed by: INTERNAL MEDICINE

## 2021-12-29 PROCEDURE — 3066F PR DOCUMENTATION OF TREATMENT FOR NEPHROPATHY: ICD-10-PCS | Mod: CPTII,S$GLB,, | Performed by: INTERNAL MEDICINE

## 2021-12-29 PROCEDURE — 1159F PR MEDICATION LIST DOCUMENTED IN MEDICAL RECORD: ICD-10-PCS | Mod: CPTII,S$GLB,, | Performed by: PHYSICIAN ASSISTANT

## 2021-12-29 PROCEDURE — 3008F PR BODY MASS INDEX (BMI) DOCUMENTED: ICD-10-PCS | Mod: CPTII,S$GLB,, | Performed by: PHYSICIAN ASSISTANT

## 2021-12-29 PROCEDURE — 3051F PR MOST RECENT HEMOGLOBIN A1C LEVEL 7.0 - < 8.0%: ICD-10-PCS | Mod: CPTII,S$GLB,, | Performed by: PHYSICIAN ASSISTANT

## 2021-12-29 PROCEDURE — 3061F PR NEG MICROALBUMINURIA RESULT DOCUMENTED/REVIEW: ICD-10-PCS | Mod: CPTII,S$GLB,, | Performed by: INTERNAL MEDICINE

## 2021-12-29 PROCEDURE — 99215 PR OFFICE/OUTPT VISIT, EST, LEVL V, 40-54 MIN: ICD-10-PCS | Mod: S$GLB,,, | Performed by: INTERNAL MEDICINE

## 2021-12-29 PROCEDURE — 4010F ACE/ARB THERAPY RXD/TAKEN: CPT | Mod: CPTII,S$GLB,, | Performed by: INTERNAL MEDICINE

## 2021-12-29 PROCEDURE — 3061F NEG MICROALBUMINURIA REV: CPT | Mod: CPTII,S$GLB,, | Performed by: PHYSICIAN ASSISTANT

## 2021-12-29 PROCEDURE — 3008F BODY MASS INDEX DOCD: CPT | Mod: CPTII,S$GLB,, | Performed by: INTERNAL MEDICINE

## 2021-12-29 PROCEDURE — 4010F ACE/ARB THERAPY RXD/TAKEN: CPT | Mod: CPTII,S$GLB,, | Performed by: PHYSICIAN ASSISTANT

## 2021-12-29 PROCEDURE — 3051F PR MOST RECENT HEMOGLOBIN A1C LEVEL 7.0 - < 8.0%: ICD-10-PCS | Mod: CPTII,S$GLB,, | Performed by: INTERNAL MEDICINE

## 2021-12-29 PROCEDURE — 1159F MED LIST DOCD IN RCRD: CPT | Mod: CPTII,S$GLB,, | Performed by: PHYSICIAN ASSISTANT

## 2021-12-29 PROCEDURE — 3061F PR NEG MICROALBUMINURIA RESULT DOCUMENTED/REVIEW: ICD-10-PCS | Mod: CPTII,S$GLB,, | Performed by: PHYSICIAN ASSISTANT

## 2021-12-29 RX ORDER — HYDRALAZINE HYDROCHLORIDE 25 MG/1
25 TABLET, FILM COATED ORAL 3 TIMES DAILY
Qty: 270 TABLET | Refills: 1 | Status: SHIPPED | OUTPATIENT
Start: 2021-12-29 | End: 2022-01-25

## 2021-12-29 RX ORDER — SACUBITRIL AND VALSARTAN 97; 103 MG/1; MG/1
1 TABLET, FILM COATED ORAL 2 TIMES DAILY
Qty: 60 TABLET | Refills: 3 | Status: SHIPPED | OUTPATIENT
Start: 2021-12-29 | End: 2022-04-14 | Stop reason: SDUPTHER

## 2021-12-30 ENCOUNTER — TELEPHONE (OUTPATIENT)
Dept: CARDIOLOGY | Facility: HOSPITAL | Age: 66
End: 2021-12-30
Payer: COMMERCIAL

## 2021-12-31 RX ORDER — ROSUVASTATIN CALCIUM 40 MG/1
40 TABLET, COATED ORAL DAILY
Qty: 90 TABLET | Refills: 3 | Status: SHIPPED | OUTPATIENT
Start: 2021-12-31 | End: 2023-01-04 | Stop reason: SDUPTHER

## 2022-01-01 ENCOUNTER — LAB VISIT (OUTPATIENT)
Dept: PRIMARY CARE CLINIC | Facility: OTHER | Age: 67
End: 2022-01-01
Attending: INTERNAL MEDICINE
Payer: COMMERCIAL

## 2022-01-01 DIAGNOSIS — Z20.822 ENCOUNTER FOR LABORATORY TESTING FOR COVID-19 VIRUS: Primary | ICD-10-CM

## 2022-01-01 PROCEDURE — U0003 INFECTIOUS AGENT DETECTION BY NUCLEIC ACID (DNA OR RNA); SEVERE ACUTE RESPIRATORY SYNDROME CORONAVIRUS 2 (SARS-COV-2) (CORONAVIRUS DISEASE [COVID-19]), AMPLIFIED PROBE TECHNIQUE, MAKING USE OF HIGH THROUGHPUT TECHNOLOGIES AS DESCRIBED BY CMS-2020-01-R: HCPCS | Mod: ST72 | Performed by: INTERNAL MEDICINE

## 2022-01-03 ENCOUNTER — PATIENT MESSAGE (OUTPATIENT)
Dept: ADMINISTRATIVE | Facility: OTHER | Age: 67
End: 2022-01-03
Payer: COMMERCIAL

## 2022-01-04 ENCOUNTER — CLINICAL SUPPORT (OUTPATIENT)
Dept: AUDIOLOGY | Facility: CLINIC | Age: 67
End: 2022-01-04
Payer: COMMERCIAL

## 2022-01-04 DIAGNOSIS — H93.13 TINNITUS, BILATERAL: ICD-10-CM

## 2022-01-04 DIAGNOSIS — H90.A21 SENSORINEURAL HEARING LOSS (SNHL) OF RIGHT EAR WITH RESTRICTED HEARING OF LEFT EAR: Primary | ICD-10-CM

## 2022-01-04 PROCEDURE — 92567 PR TYMPA2METRY: ICD-10-PCS | Mod: S$GLB,,, | Performed by: AUDIOLOGIST-HEARING AID FITTER

## 2022-01-04 PROCEDURE — 92567 TYMPANOMETRY: CPT | Mod: S$GLB,,, | Performed by: AUDIOLOGIST-HEARING AID FITTER

## 2022-01-04 PROCEDURE — 92557 COMPREHENSIVE HEARING TEST: CPT | Mod: S$GLB,,, | Performed by: AUDIOLOGIST-HEARING AID FITTER

## 2022-01-04 PROCEDURE — 92557 PR COMPREHENSIVE HEARING TEST: ICD-10-PCS | Mod: S$GLB,,, | Performed by: AUDIOLOGIST-HEARING AID FITTER

## 2022-01-04 NOTE — Clinical Note
Please f/u your plan re: right SNHL asymmetry Discussed with patient possible imaging vs audiogram monitoring

## 2022-01-04 NOTE — PROGRESS NOTES
Referring provider: Rosio Lee PA-C    Mitch Whittaker was seen 01/04/2022 for an audiological evaluation.  Patient complains of hearing loss and tinnitus. He reports a progressive decline in hearing for both ears, with his right ear being poorer than his left ear for several years. He reports tinnitus that has been recurring off and on for many years. He describes clicking, non-pulsatile. Patient has history of occupational noise exposure, working as  for fifteen years. No known family history of hearing loss. No history of ear surgery. Patient has history of DM type 2.     Results reveal a moderate-to-severe sensorineural hearing loss 250-8000 Hz for the right ear, and a mild-to-moderate sensorineural hearing loss 250-8000 Hz for the left ear.   Speech Reception Thresholds were 55 dBHL for the right ear and 40 dBHL for the left ear.   Word recognition scores were fair for the right ear and good for the left ear.   Tympanograms were Type C for the right ear and Type C for the left ear.    Patient was counseled on the above findings. He was unable to define inciting incident for right asymmetry, noting noise exposure was equal to each each.     Recommendations:  1. ENT for asymmetry in hearing.  2. Hearing aids, post medical clearance. Patient is provided a copy of his audiogram and hearing aid information packet.

## 2022-01-05 ENCOUNTER — HOSPITAL ENCOUNTER (EMERGENCY)
Facility: HOSPITAL | Age: 67
Discharge: HOME | End: 2022-01-05
Attending: EMERGENCY MEDICINE
Payer: COMMERCIAL

## 2022-01-05 ENCOUNTER — TELEPHONE (OUTPATIENT)
Dept: TRANSPLANT | Facility: CLINIC | Age: 67
End: 2022-01-05
Payer: COMMERCIAL

## 2022-01-05 ENCOUNTER — TELEPHONE (OUTPATIENT)
Dept: INTERNAL MEDICINE | Facility: CLINIC | Age: 67
End: 2022-01-05
Payer: COMMERCIAL

## 2022-01-05 ENCOUNTER — PATIENT MESSAGE (OUTPATIENT)
Dept: INTERNAL MEDICINE | Facility: CLINIC | Age: 67
End: 2022-01-05
Payer: COMMERCIAL

## 2022-01-05 VITALS
HEART RATE: 104 BPM | WEIGHT: 283 LBS | DIASTOLIC BLOOD PRESSURE: 57 MMHG | RESPIRATION RATE: 18 BRPM | TEMPERATURE: 100 F | SYSTOLIC BLOOD PRESSURE: 114 MMHG | OXYGEN SATURATION: 97 % | BODY MASS INDEX: 44.32 KG/M2

## 2022-01-05 DIAGNOSIS — D84.821 IMMUNOCOMPROMISED STATE DUE TO DRUG THERAPY: ICD-10-CM

## 2022-01-05 DIAGNOSIS — U07.1 LAB TEST POSITIVE FOR DETECTION OF COVID-19 VIRUS: Primary | ICD-10-CM

## 2022-01-05 DIAGNOSIS — Z79.899 IMMUNOCOMPROMISED STATE DUE TO DRUG THERAPY: ICD-10-CM

## 2022-01-05 DIAGNOSIS — Z94.0 HISTORY OF KIDNEY TRANSPLANT: ICD-10-CM

## 2022-01-05 DIAGNOSIS — U07.1 COVID-19 VIRUS DETECTED: ICD-10-CM

## 2022-01-05 DIAGNOSIS — U07.1 COVID-19: ICD-10-CM

## 2022-01-05 LAB
ALBUMIN SERPL BCP-MCNC: 3.7 G/DL (ref 3.5–5.2)
ALP SERPL-CCNC: 51 U/L (ref 55–135)
ALT SERPL W/O P-5'-P-CCNC: 17 U/L (ref 10–44)
ANION GAP SERPL CALC-SCNC: 16 MMOL/L (ref 8–16)
AST SERPL-CCNC: 20 U/L (ref 10–40)
BASOPHILS # BLD AUTO: 0.02 K/UL (ref 0–0.2)
BASOPHILS NFR BLD: 0.3 % (ref 0–1.9)
BILIRUB SERPL-MCNC: 0.6 MG/DL (ref 0.1–1)
BNP SERPL-MCNC: 41 PG/ML (ref 0–99)
BUN SERPL-MCNC: 69 MG/DL (ref 8–23)
CALCIUM SERPL-MCNC: 9.2 MG/DL (ref 8.7–10.5)
CHLORIDE SERPL-SCNC: 97 MMOL/L (ref 95–110)
CK SERPL-CCNC: 247 U/L (ref 20–200)
CO2 SERPL-SCNC: 27 MMOL/L (ref 23–29)
CREAT SERPL-MCNC: 2.7 MG/DL (ref 0.5–1.4)
CRP SERPL-MCNC: 27 MG/L (ref 0–8.2)
CTP QC/QA: YES
DIFFERENTIAL METHOD: ABNORMAL
EOSINOPHIL # BLD AUTO: 0.1 K/UL (ref 0–0.5)
EOSINOPHIL NFR BLD: 0.8 % (ref 0–8)
ERYTHROCYTE [DISTWIDTH] IN BLOOD BY AUTOMATED COUNT: 13.2 % (ref 11.5–14.5)
EST. GFR  (AFRICAN AMERICAN): 27 ML/MIN/1.73 M^2
EST. GFR  (NON AFRICAN AMERICAN): 23 ML/MIN/1.73 M^2
FERRITIN SERPL-MCNC: 628 NG/ML (ref 20–300)
GLUCOSE SERPL-MCNC: 121 MG/DL (ref 70–110)
HCT VFR BLD AUTO: 38.2 % (ref 40–54)
HGB BLD-MCNC: 11.4 G/DL (ref 14–18)
IMM GRANULOCYTES # BLD AUTO: 0.04 K/UL (ref 0–0.04)
IMM GRANULOCYTES NFR BLD AUTO: 0.6 % (ref 0–0.5)
LACTATE SERPL-SCNC: 1.4 MMOL/L (ref 0.5–2.2)
LDH SERPL L TO P-CCNC: 344 U/L (ref 110–260)
LYMPHOCYTES # BLD AUTO: 0.8 K/UL (ref 1–4.8)
LYMPHOCYTES NFR BLD: 12.7 % (ref 18–48)
MCH RBC QN AUTO: 25.6 PG (ref 27–31)
MCHC RBC AUTO-ENTMCNC: 29.8 G/DL (ref 32–36)
MCV RBC AUTO: 86 FL (ref 82–98)
MONOCYTES # BLD AUTO: 1.2 K/UL (ref 0.3–1)
MONOCYTES NFR BLD: 17.8 % (ref 4–15)
NEUTROPHILS # BLD AUTO: 4.4 K/UL (ref 1.8–7.7)
NEUTROPHILS NFR BLD: 67.8 % (ref 38–73)
NRBC BLD-RTO: 0 /100 WBC
PLATELET # BLD AUTO: 152 K/UL (ref 150–450)
PMV BLD AUTO: 10.5 FL (ref 9.2–12.9)
POTASSIUM SERPL-SCNC: 3.1 MMOL/L (ref 3.5–5.1)
PROCALCITONIN SERPL IA-MCNC: 0.14 NG/ML
PROT SERPL-MCNC: 7.1 G/DL (ref 6–8.4)
RBC # BLD AUTO: 4.45 M/UL (ref 4.6–6.2)
SARS-COV-2 RDRP RESP QL NAA+PROBE: POSITIVE
SODIUM SERPL-SCNC: 140 MMOL/L (ref 136–145)
TROPONIN I SERPL DL<=0.01 NG/ML-MCNC: 0.05 NG/ML (ref 0–0.03)
WBC # BLD AUTO: 6.51 K/UL (ref 3.9–12.7)

## 2022-01-05 PROCEDURE — 82728 ASSAY OF FERRITIN: CPT | Performed by: EMERGENCY MEDICINE

## 2022-01-05 PROCEDURE — 93010 EKG 12-LEAD: ICD-10-PCS | Mod: ,,, | Performed by: INTERNAL MEDICINE

## 2022-01-05 PROCEDURE — 83880 ASSAY OF NATRIURETIC PEPTIDE: CPT | Performed by: EMERGENCY MEDICINE

## 2022-01-05 PROCEDURE — 84145 PROCALCITONIN (PCT): CPT | Performed by: EMERGENCY MEDICINE

## 2022-01-05 PROCEDURE — 84484 ASSAY OF TROPONIN QUANT: CPT | Performed by: EMERGENCY MEDICINE

## 2022-01-05 PROCEDURE — 83605 ASSAY OF LACTIC ACID: CPT | Performed by: EMERGENCY MEDICINE

## 2022-01-05 PROCEDURE — 93010 ELECTROCARDIOGRAM REPORT: CPT | Mod: ,,, | Performed by: INTERNAL MEDICINE

## 2022-01-05 PROCEDURE — 83615 LACTATE (LD) (LDH) ENZYME: CPT | Performed by: EMERGENCY MEDICINE

## 2022-01-05 PROCEDURE — 93005 ELECTROCARDIOGRAM TRACING: CPT

## 2022-01-05 PROCEDURE — 99284 EMERGENCY DEPT VISIT MOD MDM: CPT | Mod: 25

## 2022-01-05 PROCEDURE — 82550 ASSAY OF CK (CPK): CPT | Performed by: EMERGENCY MEDICINE

## 2022-01-05 PROCEDURE — 80053 COMPREHEN METABOLIC PANEL: CPT | Performed by: EMERGENCY MEDICINE

## 2022-01-05 PROCEDURE — 85025 COMPLETE CBC W/AUTO DIFF WBC: CPT | Performed by: EMERGENCY MEDICINE

## 2022-01-05 PROCEDURE — 87040 BLOOD CULTURE FOR BACTERIA: CPT | Mod: 59 | Performed by: EMERGENCY MEDICINE

## 2022-01-05 PROCEDURE — 86140 C-REACTIVE PROTEIN: CPT | Performed by: EMERGENCY MEDICINE

## 2022-01-05 PROCEDURE — U0002 COVID-19 LAB TEST NON-CDC: HCPCS | Performed by: EMERGENCY MEDICINE

## 2022-01-05 RX ORDER — PROMETHAZINE HYDROCHLORIDE AND DEXTROMETHORPHAN HYDROBROMIDE 6.25; 15 MG/5ML; MG/5ML
5 SYRUP ORAL EVERY 4 HOURS PRN
Qty: 240 ML | Refills: 1 | Status: SHIPPED | OUTPATIENT
Start: 2022-01-05 | End: 2022-01-06 | Stop reason: SDUPTHER

## 2022-01-05 NOTE — ED PROVIDER NOTES
SCRIBE #1 NOTE: I, Yaima Anderson, am scribing for, and in the presence of, Gemma Bright MD. I have scribed the entire note.      History      Chief Complaint   Patient presents with    Shortness of Breath     C/o SOB, and weakness, COVID+ on the 1st       Review of patient's allergies indicates:   Allergen Reactions    Lisinopril Other (See Comments)     Other reaction(s):  cough    Actos  [pioglitazone]      Other reaction(s): chf    Metformin      Other reaction(s): renal insuff  Other reaction(s): chf        HPI   HPI    1/5/2022, 12:10 PM   History obtained from the patient      History of Present Illness: Mitch Whittaker is a 66 y.o. male patient with a PMHx of CAD, CHF, chronic immunosuppression, DM2, end stage kidney disease, HPTH, HLD, HTN, LBBB, and kidney transplant who presents to the Emergency Department for SOB which onset gradually. Symptoms are constant and moderate in severity. No mitigating or exacerbating factors reported. Associated sxs include generalized myalgias, generalized weakness, subjective fever, and headache. Patient denies any n/v/d, CP, numbness, dizziness, abdominal pain, congestion, and all other sxs at this time. No prior Tx reported. The pt reports that his wife has been experiencing similar sxs, so yesterday he took an at home COVID-19 test and it was positive. The pt reports that he is vaccinated against COVID-19 and had his booster vaccine. No further complaints or concerns at this time.         Arrival mode: EMS    PCP: Cornelio Ham MD       Past Medical History:  Past Medical History:   Diagnosis Date    Acquired renal cyst of left kidney     Anemia associated with chronic renal failure     CAD (coronary artery disease)     nonobstructive [facility] 9/14    CHF (congestive heart failure)     Chronic immunosuppression with Prograf and MMF 6/18/2015    Chronic venous insufficiency of lower extremity     CKD (chronic kidney disease) stage 3, GFR 30-59 ml/min      Diabetic retinopathy     DM (diabetes mellitus), type 2 with complications     Edema     End stage kidney disease     s/p transplant, doing well    Gallbladder polyp     Heart failure, diastolic, due to HTN     Hemodialysis status     off since transplant    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 RNA NEGATIVE 2015    History of colon polyps     HPTH (hyperparathyroidism)     Hyperlipidemia     Hypertension associated with stage 3 chronic kidney disease due to type 2 diabetes mellitus     LBBB (left bundle branch block) 2021    Morbid obesity with BMI of 45.0-49.9, adult     PCO (posterior capsular opacification), left 3/4/2019    Proteinuria     resolved s/p transplant    S/P kidney transplant     Sleep apnea     Type 2 diabetes, uncontrolled, with retinopathy     Type II diabetes mellitus with renal manifestations        Past Surgical History:  Past Surgical History:   Procedure Laterality Date    CARDIAC CATHETERIZATION      normal coronary    COLONOSCOPY N/A 2018    Procedure: COLONOSCOPY;  Surgeon: Chava Ronquillo MD;  Location: [facility];  Service: Endoscopy;  Laterality: N/A;    KIDNEY TRANSPLANT      RETINAL LASER PROCEDURE           Family History:  Family History   Problem Relation Age of Onset    Diabetes Mother     Hypertension Mother     Heart failure Mother     Kidney disease Sister         ESRD    Diabetes Sister     Diabetes Maternal Grandmother     Cancer Neg Hx        Social History:  Social History     Tobacco Use    Smoking status: Former Smoker     Quit date: 2013     Years since quittin.6    Smokeless tobacco: Former User     Quit date: 2013    Tobacco comment: used marijuana since 3656-8930, stopped after started dialysis   Substance and Sexual Activity    Alcohol use: No     Alcohol/week: 0.0 standard drinks    Drug use: No     Comment:      Sexual activity: Never       ROS   Review of  Systems   Constitutional: Positive for fever (subjective).   HENT: Negative for congestion and sore throat.    Respiratory: Positive for shortness of breath.    Cardiovascular: Negative for chest pain.   Gastrointestinal: Negative for abdominal pain, diarrhea, nausea and vomiting.   Genitourinary: Negative for dysuria.   Musculoskeletal: Positive for myalgias (generalized). Negative for back pain.   Skin: Negative for rash.   Neurological: Positive for weakness (generalized) and headaches. Negative for dizziness and numbness.   Hematological: Does not bruise/bleed easily.   All other systems reviewed and are negative.    Physical Exam      Initial Vitals [01/05/22 1113]   BP Pulse Resp Temp SpO2   133/89 110 18 100 °F (37.8 °C) (!) 93 %      MAP       --          Physical Exam  Nursing Notes and Vital Signs Reviewed.  Constitutional: Patient is in no acute distress. Well-developed and well-nourished.  Head: Atraumatic. Normocephalic.  Eyes: PERRL. EOM intact. Conjunctivae are not pale. No scleral icterus.  ENT: Mucous membranes are moist. Oropharynx is clear and symmetric.    Neck: Supple. Full ROM. No lymphadenopathy.  Cardiovascular: Regular rate. Regular rhythm. No murmurs, rubs, or gallops. Distal pulses are 2+ and symmetric.  Pulmonary/Chest: No respiratory distress. Clear to auscultation bilaterally. No wheezing or rales.  Abdominal: Soft and non-distended.  There is no tenderness.  No rebound, guarding, or rigidity.   Musculoskeletal: Moves all extremities. No obvious deformities. No edema.  Skin: Warm and dry.  Neurological:  Alert, awake, and appropriate.  Normal speech.  No acute focal neurological deficits are appreciated.  Psychiatric: Normal affect. Good eye contact. Appropriate in content.    ED Course    Procedures  ED Vital Signs:  Vitals:    01/05/22 1113 01/05/22 1208 01/05/22 1213 01/05/22 1228   BP: 133/89 (!) 107/52 (!) 118/56 (!) 114/57   Pulse: 110 102 102 104   Resp: 18      Temp: 100 °F  (37.8 °C)      TempSrc: Oral      SpO2: (!) 93% 98% 96% 97%   Weight: 128.4 kg (283 lb)          Abnormal Lab Results:  Labs Reviewed   CBC W/ AUTO DIFFERENTIAL - Abnormal; Notable for the following components:       Result Value    RBC 4.45 (*)     Hemoglobin 11.4 (*)     Hematocrit 38.2 (*)     MCH 25.6 (*)     MCHC 29.8 (*)     Immature Granulocytes 0.6 (*)     Lymph # 0.8 (*)     Mono # 1.2 (*)     Lymph % 12.7 (*)     Mono % 17.8 (*)     All other components within normal limits   COMPREHENSIVE METABOLIC PANEL - Abnormal; Notable for the following components:    Potassium 3.1 (*)     Glucose 121 (*)     BUN 69 (*)     Creatinine 2.7 (*)     Alkaline Phosphatase 51 (*)     eGFR if  27 (*)     eGFR if non  23 (*)     All other components within normal limits   C-REACTIVE PROTEIN - Abnormal; Notable for the following components:    CRP 27.0 (*)     All other components within normal limits   FERRITIN - Abnormal; Notable for the following components:    Ferritin 628 (*)     All other components within normal limits   LACTATE DEHYDROGENASE - Abnormal; Notable for the following components:     (*)     All other components within normal limits   CK - Abnormal; Notable for the following components:     (*)     All other components within normal limits   TROPONIN I - Abnormal; Notable for the following components:    Troponin I 0.047 (*)     All other components within normal limits   SARS-COV-2 RDRP GENE - Abnormal; Notable for the following components:    POC Rapid COVID Positive (*)     All other components within normal limits    Narrative:     This test utilizes isothermal nucleic acid amplification   technology to detect the SARS-CoV-2 RdRp nucleic acid segment.   The analytical sensitivity (limit of detection) is 125 genome   equivalents/mL.   A POSITIVE result implies infection with the SARS-CoV-2 virus;   the patient is presumed to be contagious.     A NEGATIVE  result means that SARS-CoV-2 nucleic acids are not   present above the limit of detection. A NEGATIVE result should be   treated as presumptive. It does not rule out the possibility of   COVID-19 and should not be the sole basis for treatment decisions.   If COVID-19 is strongly suspected based on clinical and exposure   history, re-testing using an alternate molecular assay should be   considered.   This test is only for use under the Food and Drug   Administration s Emergency Use Authorization (EUA).   Commercial kits are provided by [employer].   Performance characteristics of the EUA have been independently   verified by [facility] Department of   Pathology and Laboratory Medicine.   _________________________________________________________________   The authorized Fact Sheet for Healthcare Providers and the authorized Fact   Sheet for Patients of the ID NOW COVID-19 are available on the FDA   website:     https://www.fda.gov/media/283059/download  https://www.fda.gov/media/405628/download         CULTURE, BLOOD   CULTURE, BLOOD   LACTIC ACID, PLASMA   PROCALCITONIN   B-TYPE NATRIURETIC PEPTIDE        All Lab Results:  Results for orders placed or performed during the hospital encounter of 01/05/22   CBC auto differential   Result Value Ref Range    WBC 6.51 3.90 - 12.70 K/uL    RBC 4.45 (L) 4.60 - 6.20 M/uL    Hemoglobin 11.4 (L) 14.0 - 18.0 g/dL    Hematocrit 38.2 (L) 40.0 - 54.0 %    MCV 86 82 - 98 fL    MCH 25.6 (L) 27.0 - 31.0 pg    MCHC 29.8 (L) 32.0 - 36.0 g/dL    RDW 13.2 11.5 - 14.5 %    Platelets 152 150 - 450 K/uL    MPV 10.5 9.2 - 12.9 fL    Immature Granulocytes 0.6 (H) 0.0 - 0.5 %    Gran # (ANC) 4.4 1.8 - 7.7 K/uL    Immature Grans (Abs) 0.04 0.00 - 0.04 K/uL    Lymph # 0.8 (L) 1.0 - 4.8 K/uL    Mono # 1.2 (H) 0.3 - 1.0 K/uL    Eos # 0.1 0.0 - 0.5 K/uL    Baso # 0.02 0.00 - 0.20 K/uL    nRBC 0 0 /100 WBC    Gran % 67.8 38.0 - 73.0 %    Lymph % 12.7 (L) 18.0 - 48.0 %    Mono %  17.8 (H) 4.0 - 15.0 %    Eosinophil % 0.8 0.0 - 8.0 %    Basophil % 0.3 0.0 - 1.9 %    Differential Method Automated    Comprehensive metabolic panel   Result Value Ref Range    Sodium 140 136 - 145 mmol/L    Potassium 3.1 (L) 3.5 - 5.1 mmol/L    Chloride 97 95 - 110 mmol/L    CO2 27 23 - 29 mmol/L    Glucose 121 (H) 70 - 110 mg/dL    BUN 69 (H) 8 - 23 mg/dL    Creatinine 2.7 (H) 0.5 - 1.4 mg/dL    Calcium 9.2 8.7 - 10.5 mg/dL    Total Protein 7.1 6.0 - 8.4 g/dL    Albumin 3.7 3.5 - 5.2 g/dL    Total Bilirubin 0.6 0.1 - 1.0 mg/dL    Alkaline Phosphatase 51 (L) 55 - 135 U/L    AST 20 10 - 40 U/L    ALT 17 10 - 44 U/L    Anion Gap 16 8 - 16 mmol/L    eGFR if African American 27 (A) >60 mL/min/1.73 m^2    eGFR if non African American 23 (A) >60 mL/min/1.73 m^2   C-Reactive Protein   Result Value Ref Range    CRP 27.0 (H) 0.0 - 8.2 mg/L   Ferritin   Result Value Ref Range    Ferritin 628 (H) 20.0 - 300.0 ng/mL   Lactate Dehydrogenase   Result Value Ref Range     (H) 110 - 260 U/L   CK   Result Value Ref Range     (H) 20 - 200 U/L   Lactic Acid, Plasma   Result Value Ref Range    Lactate (Lactic Acid) 1.4 0.5 - 2.2 mmol/L   Troponin I   Result Value Ref Range    Troponin I 0.047 (H) 0.000 - 0.026 ng/mL   Procalcitonin   Result Value Ref Range    Procalcitonin 0.14 <0.25 ng/mL   Brain Natriuretic Peptide   Result Value Ref Range    BNP 41 0 - 99 pg/mL   POCT COVID-19 Rapid Screening   Result Value Ref Range    POC Rapid COVID Positive (A) Negative     Acceptable Yes      *Note: Due to a large number of results and/or encounters for the requested time period, some results have not been displayed. A complete set of results can be found in Results Review.         Imaging Results:  Imaging Results          X-Ray Chest AP Portable (Final result)  Result time 01/05/22 12:54:34    Final result by Jefry Osborne MD (01/05/22 12:54:34)                 Impression:      No acute  abnormality.      Electronically signed by: Jefry Osborne  Date:    01/05/2022  Time:    12:54             Narrative:    EXAMINATION:  XR CHEST AP PORTABLE    CLINICAL HISTORY:  shortness of breath;.    TECHNIQUE:  Single frontal portable view of the chest was performed.    COMPARISON:  12/06/2021    FINDINGS:  Support devices: Right subclavian stent.    The lungs are clear, with normal appearance of pulmonary vasculature and no pleural effusion or pneumothorax.    The cardiac silhouette is normal in size. The hilar and mediastinal contours are unremarkable.    Bones are intact.                               The EKG was ordered, reviewed, and independently interpreted by the ED provider.  Interpretation time: 11:53  Rate: 107 BPM  Rhythm: sinus tachycardia  Interpretation: Right superior axis deviation. Nonspecific intraventricular block. No STEMI.  When compared to EKG performed on 12/6/21, there are no significant changes.           The Emergency Provider reviewed the vital signs and test results, which are outlined above.    ED Discussion     1:20 PM: Reassessed pt at this time, clincially stable, covid positive, no pneumonia, VSS, BAM infusion order placed, of note patient refusing to wear mask while in the ER and thus was escorted out . Discussed with pt all pertinent ED information and results. Discussed pt dx and plan of tx. Gave pt all f/u and return to the ED instructions. All questions and concerns were addressed at this time. Pt expresses understanding of information and instructions, and is comfortable with plan to discharge. Pt is stable for discharge.    I discussed with patient and/or family/caretaker that evaluation in the ED does not suggest any emergent or life threatening medical conditions requiring immediate intervention beyond what was provided in the ED, and I believe patient is safe for discharge.  Regardless, an unremarkable evaluation in the ED does not preclude the development or presence of  a serious of life threatening condition. As such, patient was instructed to return immediately for any worsening or change in current symptoms.           ED Medication(s):  Medications - No data to display     Follow-up Information     Cornelio Ham MD. Schedule an appointment as soon as possible for a visit in 2 days.    Specialty: Internal Medicine  Why: Return to the Emergency Room, If symptoms worsen  Contact information:  [address]   [address]  [facility] [address]  [phone]                         Discharge Medication List as of 1/5/2022  1:19 PM            Medical Decision Making    Medical Decision Making:   Clinical Tests:   Lab Tests: Ordered and Reviewed  Radiological Study: Ordered and Reviewed  Medical Tests: Ordered and Reviewed           Scribe Attestation:   Scribe #1: I performed the above scribed service and the documentation accurately describes the services I performed. I attest to the accuracy of the note.    Attending:   Physician Attestation Statement for Scribe #1: I, Gemma Bright MD, personally performed the services described in this documentation, as scribed by Yaima Anderson, in my presence, and it is both accurate and complete.          Clinical Impression       ICD-10-CM ICD-9-CM   1. Lab test positive for detection of COVID-19 virus  U07.1 079.89   2. COVID-19  U07.1 079.89   3. History of kidney transplant  Z94.0 V42.0   4. Immunocompromised state due to drug therapy  D84.821 V58.69    Z79.899        Disposition:   Disposition: Discharged  Condition: Stable         Gemma Bright MD  01/05/22 1614       Gemma Bright MD  01/05/22 1610

## 2022-01-05 NOTE — TELEPHONE ENCOUNTER
Return phone call to patient wife stating patient tested positive for COVID earlier this morning. Patient C/O Cough , Congestion and SOB getting worse. Patient and Wife Michelle voice a understanding to present to the ER now for SOB and keep coordinator posted , also reviewed with patient and wife to HOLD MMF x 2 weeks or until S/S subside.  ----- Message from Sean Lobato RN sent at 1/5/2022  9:33 AM CST -----    ----- Message -----  From: Forrest Huntley  Sent: 1/5/2022   8:08 AM CST  To: [facility] Post-Kidney Transplant Clinical    Pt wife calling to speak w/ Sean. Stating the pt has tested positive for Covid and inquiring about remedies.    [phone]

## 2022-01-05 NOTE — TELEPHONE ENCOUNTER
----- Message from Kareen Corea sent at 1/5/2022  7:02 AM CST -----  Contact: Michelle Martinez called in to say that patient has tested positive for Covid on 1/4/21, and she wants to know what can he take for self care treatment at home. Please call her at [phone] or [phone].    Thanks  Td

## 2022-01-06 ENCOUNTER — INFUSION (OUTPATIENT)
Dept: INFECTIOUS DISEASES | Facility: HOSPITAL | Age: 67
End: 2022-01-06
Attending: EMERGENCY MEDICINE
Payer: COMMERCIAL

## 2022-01-06 ENCOUNTER — PATIENT MESSAGE (OUTPATIENT)
Dept: INTERNAL MEDICINE | Facility: CLINIC | Age: 67
End: 2022-01-06
Payer: COMMERCIAL

## 2022-01-06 VITALS
TEMPERATURE: 98 F | SYSTOLIC BLOOD PRESSURE: 107 MMHG | HEART RATE: 91 BPM | OXYGEN SATURATION: 98 % | DIASTOLIC BLOOD PRESSURE: 52 MMHG | RESPIRATION RATE: 18 BRPM

## 2022-01-06 DIAGNOSIS — U07.1 LAB TEST POSITIVE FOR DETECTION OF COVID-19 VIRUS: ICD-10-CM

## 2022-01-06 LAB
SARS-COV-2 RNA RESP QL NAA+PROBE: NOT DETECTED
SARS-COV-2- CYCLE NUMBER: NORMAL

## 2022-01-06 PROCEDURE — 63600175 PHARM REV CODE 636 W HCPCS: Performed by: INTERNAL MEDICINE

## 2022-01-06 PROCEDURE — M0247 HC IV INFUSION, SOTROVIMAB, INCL POST ADMIN MONIT: HCPCS

## 2022-01-06 PROCEDURE — 25000003 PHARM REV CODE 250: Performed by: INTERNAL MEDICINE

## 2022-01-06 RX ORDER — ONDANSETRON 4 MG/1
4 TABLET, ORALLY DISINTEGRATING ORAL ONCE AS NEEDED
Status: DISCONTINUED | OUTPATIENT
Start: 2022-01-06 | End: 2022-03-18

## 2022-01-06 RX ORDER — SODIUM CHLORIDE 0.9 % (FLUSH) 0.9 %
10 SYRINGE (ML) INJECTION
Status: DISCONTINUED | OUTPATIENT
Start: 2022-01-06 | End: 2022-03-18

## 2022-01-06 RX ORDER — PROMETHAZINE HYDROCHLORIDE AND DEXTROMETHORPHAN HYDROBROMIDE 6.25; 15 MG/5ML; MG/5ML
5 SYRUP ORAL EVERY 4 HOURS PRN
Qty: 240 ML | Refills: 1 | Status: SHIPPED | OUTPATIENT
Start: 2022-01-06 | End: 2022-01-16

## 2022-01-06 RX ORDER — ACETAMINOPHEN 325 MG/1
650 TABLET ORAL ONCE AS NEEDED
Status: ACTIVE | OUTPATIENT
Start: 2022-01-06 | End: 2033-06-04

## 2022-01-06 RX ORDER — EPINEPHRINE 0.3 MG/.3ML
0.3 INJECTION SUBCUTANEOUS
Status: DISCONTINUED | OUTPATIENT
Start: 2022-01-06 | End: 2022-03-18

## 2022-01-06 RX ORDER — ALBUTEROL SULFATE 90 UG/1
2 AEROSOL, METERED RESPIRATORY (INHALATION)
Status: DISCONTINUED | OUTPATIENT
Start: 2022-01-06 | End: 2022-03-18

## 2022-01-06 RX ORDER — DIPHENHYDRAMINE HYDROCHLORIDE 50 MG/ML
25 INJECTION INTRAMUSCULAR; INTRAVENOUS ONCE AS NEEDED
Status: DISCONTINUED | OUTPATIENT
Start: 2022-01-06 | End: 2022-03-18

## 2022-01-06 RX ADMIN — SODIUM CHLORIDE 500 MG: 9 INJECTION, SOLUTION INTRAVENOUS at 02:01

## 2022-01-06 NOTE — TELEPHONE ENCOUNTER
This was called in to [facility] pharm that was closed due to short staffing. Pt is requesting this be called into [facility]

## 2022-01-07 ENCOUNTER — TELEPHONE (OUTPATIENT)
Dept: INTERNAL MEDICINE | Facility: CLINIC | Age: 67
End: 2022-01-07
Payer: COMMERCIAL

## 2022-01-07 ENCOUNTER — PATIENT MESSAGE (OUTPATIENT)
Dept: INTERNAL MEDICINE | Facility: CLINIC | Age: 67
End: 2022-01-07
Payer: COMMERCIAL

## 2022-01-07 NOTE — TELEPHONE ENCOUNTER
----- Message from Doris Hanson sent at 1/7/2022 11:42 AM CST -----  .Type:  Needs Medical Advice    Who Called: pt   Symptoms (please be specific):  coughing with covid  How long has patient had these symptoms:    Pharmacy name and phone #:  .    [facility]   [email or web address]       Would the patient rather a call back or a response via [facility]? call  Best Call Back Number: .[phone]    Additional Information: pt has called yesterday. Pt stated that he is a transplant pt and wanted to know what would be a good medication to take. Pt also stated that he has a burning feeling when urinating.

## 2022-01-11 LAB
BACTERIA BLD CULT: NORMAL
BACTERIA BLD CULT: NORMAL

## 2022-01-12 RX ORDER — LANOLIN ALCOHOL/MO/W.PET/CERES
400 CREAM (GRAM) TOPICAL DAILY
Qty: 90 TABLET | Refills: 1 | Status: SHIPPED | OUTPATIENT
Start: 2022-01-12 | End: 2022-07-14 | Stop reason: SDUPTHER

## 2022-01-14 ENCOUNTER — TELEPHONE (OUTPATIENT)
Dept: CARDIOLOGY | Facility: HOSPITAL | Age: 67
End: 2022-01-14
Payer: COMMERCIAL

## 2022-01-18 ENCOUNTER — HOSPITAL ENCOUNTER (OUTPATIENT)
Dept: CARDIOLOGY | Facility: HOSPITAL | Age: 67
Discharge: HOME | End: 2022-01-18
Attending: INTERNAL MEDICINE
Payer: COMMERCIAL

## 2022-01-18 DIAGNOSIS — I42.8 INFILTRATIVE CARDIOMYOPATHY: ICD-10-CM

## 2022-01-18 DIAGNOSIS — I50.42 CHRONIC COMBINED SYSTOLIC AND DIASTOLIC CONGESTIVE HEART FAILURE: ICD-10-CM

## 2022-01-18 LAB — OHS CV PLANAR SCORE: 0

## 2022-01-18 PROCEDURE — 78803 CV PYP ATTR W SPECT (CUPID ONLY): ICD-10-PCS | Mod: 26,,, | Performed by: INTERNAL MEDICINE

## 2022-01-18 PROCEDURE — 78803 RP LOCLZJ TUM SPECT 1 AREA: CPT | Mod: 26,,, | Performed by: INTERNAL MEDICINE

## 2022-01-18 PROCEDURE — 78803 RP LOCLZJ TUM SPECT 1 AREA: CPT

## 2022-01-19 ENCOUNTER — OFFICE VISIT (OUTPATIENT)
Dept: INTERNAL MEDICINE | Facility: CLINIC | Age: 67
End: 2022-01-19
Payer: COMMERCIAL

## 2022-01-19 VITALS
OXYGEN SATURATION: 98 % | DIASTOLIC BLOOD PRESSURE: 80 MMHG | HEIGHT: 67 IN | SYSTOLIC BLOOD PRESSURE: 138 MMHG | HEART RATE: 93 BPM | BODY MASS INDEX: 44.11 KG/M2 | WEIGHT: 281.06 LBS

## 2022-01-19 DIAGNOSIS — I50.42 CHRONIC COMBINED SYSTOLIC AND DIASTOLIC CONGESTIVE HEART FAILURE: ICD-10-CM

## 2022-01-19 DIAGNOSIS — Z79.4 TYPE 2 DIABETES MELLITUS WITH STABLE PROLIFERATIVE RETINOPATHY OF BOTH EYES, WITH LONG-TERM CURRENT USE OF INSULIN: ICD-10-CM

## 2022-01-19 DIAGNOSIS — E11.8 DM (DIABETES MELLITUS), TYPE 2 WITH COMPLICATIONS: ICD-10-CM

## 2022-01-19 DIAGNOSIS — I12.9 HYPERTENSION ASSOCIATED WITH STAGE 3 CHRONIC KIDNEY DISEASE DUE TO TYPE 2 DIABETES MELLITUS: ICD-10-CM

## 2022-01-19 DIAGNOSIS — E11.22 HYPERTENSION ASSOCIATED WITH STAGE 3 CHRONIC KIDNEY DISEASE DUE TO TYPE 2 DIABETES MELLITUS: ICD-10-CM

## 2022-01-19 DIAGNOSIS — U07.1 COVID-19 VIRUS INFECTION: Primary | ICD-10-CM

## 2022-01-19 DIAGNOSIS — N18.31 STAGE 3A CHRONIC KIDNEY DISEASE: ICD-10-CM

## 2022-01-19 DIAGNOSIS — Z29.89 PROPHYLACTIC IMMUNOTHERAPY: Chronic | ICD-10-CM

## 2022-01-19 DIAGNOSIS — E11.3553 TYPE 2 DIABETES MELLITUS WITH STABLE PROLIFERATIVE RETINOPATHY OF BOTH EYES, WITH LONG-TERM CURRENT USE OF INSULIN: ICD-10-CM

## 2022-01-19 DIAGNOSIS — N18.30 HYPERTENSION ASSOCIATED WITH STAGE 3 CHRONIC KIDNEY DISEASE DUE TO TYPE 2 DIABETES MELLITUS: ICD-10-CM

## 2022-01-19 DIAGNOSIS — E11.21 TYPE 2 DIABETES MELLITUS WITH DIABETIC NEPHROPATHY, UNSPECIFIED WHETHER LONG TERM INSULIN USE: ICD-10-CM

## 2022-01-19 DIAGNOSIS — E11.42 DIABETIC PERIPHERAL NEUROPATHY: ICD-10-CM

## 2022-01-19 PROCEDURE — 99999 PR PBB SHADOW E&M-EST. PATIENT-LVL V: CPT | Mod: PBBFAC,,, | Performed by: INTERNAL MEDICINE

## 2022-01-19 PROCEDURE — 3079F PR MOST RECENT DIASTOLIC BLOOD PRESSURE 80-89 MM HG: ICD-10-PCS | Mod: CPTII,S$GLB,, | Performed by: INTERNAL MEDICINE

## 2022-01-19 PROCEDURE — 3008F BODY MASS INDEX DOCD: CPT | Mod: CPTII,S$GLB,, | Performed by: INTERNAL MEDICINE

## 2022-01-19 PROCEDURE — 1101F PT FALLS ASSESS-DOCD LE1/YR: CPT | Mod: CPTII,S$GLB,, | Performed by: INTERNAL MEDICINE

## 2022-01-19 PROCEDURE — 1126F AMNT PAIN NOTED NONE PRSNT: CPT | Mod: CPTII,S$GLB,, | Performed by: INTERNAL MEDICINE

## 2022-01-19 PROCEDURE — 99213 OFFICE O/P EST LOW 20 MIN: CPT | Mod: S$GLB,,, | Performed by: INTERNAL MEDICINE

## 2022-01-19 PROCEDURE — 3051F PR MOST RECENT HEMOGLOBIN A1C LEVEL 7.0 - < 8.0%: ICD-10-PCS | Mod: CPTII,S$GLB,, | Performed by: INTERNAL MEDICINE

## 2022-01-19 PROCEDURE — 1159F PR MEDICATION LIST DOCUMENTED IN MEDICAL RECORD: ICD-10-PCS | Mod: CPTII,S$GLB,, | Performed by: INTERNAL MEDICINE

## 2022-01-19 PROCEDURE — 3075F PR MOST RECENT SYSTOLIC BLOOD PRESS GE 130-139MM HG: ICD-10-PCS | Mod: CPTII,S$GLB,, | Performed by: INTERNAL MEDICINE

## 2022-01-19 PROCEDURE — 3072F PR LOW RISK FOR RETINOPATHY: ICD-10-PCS | Mod: CPTII,S$GLB,, | Performed by: INTERNAL MEDICINE

## 2022-01-19 PROCEDURE — 3288F PR FALLS RISK ASSESSMENT DOCUMENTED: ICD-10-PCS | Mod: CPTII,S$GLB,, | Performed by: INTERNAL MEDICINE

## 2022-01-19 PROCEDURE — 1126F PR PAIN SEVERITY QUANTIFIED, NO PAIN PRESENT: ICD-10-PCS | Mod: CPTII,S$GLB,, | Performed by: INTERNAL MEDICINE

## 2022-01-19 PROCEDURE — 3072F LOW RISK FOR RETINOPATHY: CPT | Mod: CPTII,S$GLB,, | Performed by: INTERNAL MEDICINE

## 2022-01-19 PROCEDURE — 1159F MED LIST DOCD IN RCRD: CPT | Mod: CPTII,S$GLB,, | Performed by: INTERNAL MEDICINE

## 2022-01-19 PROCEDURE — 99999 PR PBB SHADOW E&M-EST. PATIENT-LVL V: ICD-10-PCS | Mod: PBBFAC,,, | Performed by: INTERNAL MEDICINE

## 2022-01-19 PROCEDURE — 1101F PR PT FALLS ASSESS DOC 0-1 FALLS W/OUT INJ PAST YR: ICD-10-PCS | Mod: CPTII,S$GLB,, | Performed by: INTERNAL MEDICINE

## 2022-01-19 PROCEDURE — 3288F FALL RISK ASSESSMENT DOCD: CPT | Mod: CPTII,S$GLB,, | Performed by: INTERNAL MEDICINE

## 2022-01-19 PROCEDURE — 3008F PR BODY MASS INDEX (BMI) DOCUMENTED: ICD-10-PCS | Mod: CPTII,S$GLB,, | Performed by: INTERNAL MEDICINE

## 2022-01-19 PROCEDURE — 3079F DIAST BP 80-89 MM HG: CPT | Mod: CPTII,S$GLB,, | Performed by: INTERNAL MEDICINE

## 2022-01-19 PROCEDURE — 3075F SYST BP GE 130 - 139MM HG: CPT | Mod: CPTII,S$GLB,, | Performed by: INTERNAL MEDICINE

## 2022-01-19 PROCEDURE — 99213 PR OFFICE/OUTPT VISIT, EST, LEVL III, 20-29 MIN: ICD-10-PCS | Mod: S$GLB,,, | Performed by: INTERNAL MEDICINE

## 2022-01-19 PROCEDURE — 3051F HG A1C>EQUAL 7.0%<8.0%: CPT | Mod: CPTII,S$GLB,, | Performed by: INTERNAL MEDICINE

## 2022-01-20 ENCOUNTER — TELEPHONE (OUTPATIENT)
Dept: TRANSPLANT | Facility: CLINIC | Age: 67
End: 2022-01-20
Payer: COMMERCIAL

## 2022-01-20 DIAGNOSIS — D89.89 LAMBDA LIGHT CHAIN DISEASE: ICD-10-CM

## 2022-01-20 DIAGNOSIS — D89.89 LIGHT CHAIN DISEASE, KAPPA TYPE: Primary | ICD-10-CM

## 2022-01-24 ENCOUNTER — PATIENT OUTREACH (OUTPATIENT)
Dept: ADMINISTRATIVE | Facility: OTHER | Age: 67
End: 2022-01-24
Payer: COMMERCIAL

## 2022-01-24 NOTE — PROGRESS NOTES
Health Maintenance Due   Topic Date Due    TETANUS VACCINE  12/04/2019    Pneumococcal Vaccines (Age 65+) (4 of 4 - PPSV23) 09/26/2020    Colorectal Cancer Screening  04/05/2021    Shingles Vaccine (2 of 2) 01/18/2022     Updates were requested from care everywhere.  Chart was reviewed for overdue Proactive [facility]ters ([facility]) topics (CRS, Breast Cancer Screening, Eye exam)  Health Maintenance has been updated.  LINKS immunization registry triggered.  Immunizations were reconciled.

## 2022-01-25 ENCOUNTER — PATIENT MESSAGE (OUTPATIENT)
Dept: PHARMACY | Facility: CLINIC | Age: 67
End: 2022-01-25
Payer: COMMERCIAL

## 2022-01-25 ENCOUNTER — OFFICE VISIT (OUTPATIENT)
Dept: NEPHROLOGY | Facility: CLINIC | Age: 67
End: 2022-01-25
Payer: COMMERCIAL

## 2022-01-25 VITALS
DIASTOLIC BLOOD PRESSURE: 60 MMHG | WEIGHT: 279.56 LBS | HEIGHT: 67 IN | HEART RATE: 100 BPM | BODY MASS INDEX: 43.88 KG/M2 | SYSTOLIC BLOOD PRESSURE: 100 MMHG

## 2022-01-25 DIAGNOSIS — Z94.0 KIDNEY TRANSPLANTED: Primary | ICD-10-CM

## 2022-01-25 PROCEDURE — 1101F PR PT FALLS ASSESS DOC 0-1 FALLS W/OUT INJ PAST YR: ICD-10-PCS | Mod: CPTII,S$GLB,, | Performed by: INTERNAL MEDICINE

## 2022-01-25 PROCEDURE — 3066F NEPHROPATHY DOC TX: CPT | Mod: CPTII,S$GLB,, | Performed by: INTERNAL MEDICINE

## 2022-01-25 PROCEDURE — 4010F ACE/ARB THERAPY RXD/TAKEN: CPT | Mod: CPTII,S$GLB,, | Performed by: INTERNAL MEDICINE

## 2022-01-25 PROCEDURE — 3078F DIAST BP <80 MM HG: CPT | Mod: CPTII,S$GLB,, | Performed by: INTERNAL MEDICINE

## 2022-01-25 PROCEDURE — 3072F LOW RISK FOR RETINOPATHY: CPT | Mod: CPTII,S$GLB,, | Performed by: INTERNAL MEDICINE

## 2022-01-25 PROCEDURE — 1159F MED LIST DOCD IN RCRD: CPT | Mod: CPTII,S$GLB,, | Performed by: INTERNAL MEDICINE

## 2022-01-25 PROCEDURE — 3078F PR MOST RECENT DIASTOLIC BLOOD PRESSURE < 80 MM HG: ICD-10-PCS | Mod: CPTII,S$GLB,, | Performed by: INTERNAL MEDICINE

## 2022-01-25 PROCEDURE — 3072F PR LOW RISK FOR RETINOPATHY: ICD-10-PCS | Mod: CPTII,S$GLB,, | Performed by: INTERNAL MEDICINE

## 2022-01-25 PROCEDURE — 3008F PR BODY MASS INDEX (BMI) DOCUMENTED: ICD-10-PCS | Mod: CPTII,S$GLB,, | Performed by: INTERNAL MEDICINE

## 2022-01-25 PROCEDURE — 1126F PR PAIN SEVERITY QUANTIFIED, NO PAIN PRESENT: ICD-10-PCS | Mod: CPTII,S$GLB,, | Performed by: INTERNAL MEDICINE

## 2022-01-25 PROCEDURE — 3074F SYST BP LT 130 MM HG: CPT | Mod: CPTII,S$GLB,, | Performed by: INTERNAL MEDICINE

## 2022-01-25 PROCEDURE — 3288F PR FALLS RISK ASSESSMENT DOCUMENTED: ICD-10-PCS | Mod: CPTII,S$GLB,, | Performed by: INTERNAL MEDICINE

## 2022-01-25 PROCEDURE — 99215 PR OFFICE/OUTPT VISIT, EST, LEVL V, 40-54 MIN: ICD-10-PCS | Mod: S$GLB,,, | Performed by: INTERNAL MEDICINE

## 2022-01-25 PROCEDURE — 4010F PR ACE/ARB THEARPY RXD/TAKEN: ICD-10-PCS | Mod: CPTII,S$GLB,, | Performed by: INTERNAL MEDICINE

## 2022-01-25 PROCEDURE — 1101F PT FALLS ASSESS-DOCD LE1/YR: CPT | Mod: CPTII,S$GLB,, | Performed by: INTERNAL MEDICINE

## 2022-01-25 PROCEDURE — 3008F BODY MASS INDEX DOCD: CPT | Mod: CPTII,S$GLB,, | Performed by: INTERNAL MEDICINE

## 2022-01-25 PROCEDURE — 1126F AMNT PAIN NOTED NONE PRSNT: CPT | Mod: CPTII,S$GLB,, | Performed by: INTERNAL MEDICINE

## 2022-01-25 PROCEDURE — 3074F PR MOST RECENT SYSTOLIC BLOOD PRESSURE < 130 MM HG: ICD-10-PCS | Mod: CPTII,S$GLB,, | Performed by: INTERNAL MEDICINE

## 2022-01-25 PROCEDURE — 3066F PR DOCUMENTATION OF TREATMENT FOR NEPHROPATHY: ICD-10-PCS | Mod: CPTII,S$GLB,, | Performed by: INTERNAL MEDICINE

## 2022-01-25 PROCEDURE — 99215 OFFICE O/P EST HI 40 MIN: CPT | Mod: S$GLB,,, | Performed by: INTERNAL MEDICINE

## 2022-01-25 PROCEDURE — 1159F PR MEDICATION LIST DOCUMENTED IN MEDICAL RECORD: ICD-10-PCS | Mod: CPTII,S$GLB,, | Performed by: INTERNAL MEDICINE

## 2022-01-25 PROCEDURE — 3288F FALL RISK ASSESSMENT DOCD: CPT | Mod: CPTII,S$GLB,, | Performed by: INTERNAL MEDICINE

## 2022-01-25 PROCEDURE — 99999 PR PBB SHADOW E&M-EST. PATIENT-LVL V: ICD-10-PCS | Mod: PBBFAC,,, | Performed by: INTERNAL MEDICINE

## 2022-01-25 PROCEDURE — 99999 PR PBB SHADOW E&M-EST. PATIENT-LVL V: CPT | Mod: PBBFAC,,, | Performed by: INTERNAL MEDICINE

## 2022-01-25 RX ORDER — HYDRALAZINE HYDROCHLORIDE 10 MG/1
10 TABLET, FILM COATED ORAL 3 TIMES DAILY
Qty: 270 TABLET | Refills: 3 | Status: SHIPPED | OUTPATIENT
Start: 2022-01-25 | End: 2022-02-10

## 2022-01-25 NOTE — PROGRESS NOTES
Mitch Whittaker is a 66 y.o. male for whom nephrology consult has been requested to evaluate and give opinion.   Renal clinic f/u note:  Date of clinic visit: 1/25/22  Reason for f/u and chief c/o: h/o of kidney transplant. Post hospital visit    HPI: Pt was seen and examined. Labs and meds reviewed. Pt is a 65 y/o male with h/o of living related kidney transplant from his daughter in 2015 who presents for f/u. Pt has a h/o of combined diastolic and systolic CHF, DM-2, HTN, CKD stage 3, and obesity. Pt was recently hospitalized for atypical CP at [facility], likely due to costochondritis. Pt presents with his daughter. There are no new labs. He has no new or acute c/o's, no CP, no SOB, as some leg swelling. Labs and meds reviewed with him. Note mild hypotension. He feels slightly lightheaded occasionally. Has current COVID, is fully vaccinated, no sx's.      PAST MEDICAL HISTORY: living related kidney transplant form daughter 6/15/2015 (on HD pre-transplant x 2.5 years), CKD stage 3, DM-2, HTN, CAD (coronary artery disease), combined diastolic and systolic (EF 40%) CHF, Chronic venous insufficiency of lower extremity, Diabetic retinopathy, Gallbladder polyp, 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, History of colon polyps, HPTH (hyperparathyroidism), Hyperlipidemia, LBBB (left bundle branch block) (12/20/2021), Morbid obesity with BMI of 45.0-49.9, adult, PCO (posterior capsular opacification), left (3/4/2019), Sleep apnea,    PAST SURGICAL HISTORY:  He  has a past surgical history that includes Retinal laser procedure; Cardiac catheterization (2008); Kidney transplant; and Colonoscopy (N/A, 4/5/2018).    SOCIAL HISTORY:  He  reports that he quit smoking about 8 years ago. He quit smokeless tobacco use about 8 years ago. He reports that he does not drink alcohol and does not use drugs.    FAMILY MEDICAL HISTORY:  His family history includes Diabetes in his maternal grandmother, mother, and sister; Heart failure in his  "mother; Hypertension in his mother; Kidney disease in his sister.    Review of patient's allergies indicates:   Allergen Reactions    Lisinopril Other (See Comments)     Other reaction(s):  cough    Actos  [pioglitazone]      Other reaction(s): chf    Metformin      Other reaction(s): renal insuff  Other reaction(s): chf           Prior to Admission medications    Medication Sig Start Date End Date Taking? Authorizing Provider   apixaban (ELIQUIS) 5 mg Tab Take 1 tablet (5 mg total) by mouth 2 (two) times daily. 9/27/21  Yes Micah Muhammad MD   aspirin (ECOTRIN) 81 MG EC tablet Take 1 tablet by mouth Daily.    Yes Historical Provider   BD ULTRA-FINE MINI PEN NEEDLE 31 gauge x 3/16" Ndle Use to inject insulin as needed up to 4 times daily  Patient taking differently: Use to inject insulin as needed up to 4 times daily 3/17/21  Yes Cornelio Ham MD   bisacodyl (DULCOLAX) 5 mg EC tablet Take 5 mg by mouth daily as needed for Constipation.   Yes Historical Provider   blood sugar diagnostic Strp Use to test four times per day  Patient taking differently: Use to test four times per day 10/25/18  Yes Cornelio Ham MD   calcitRIOL (ROCALTROL) 0.25 MCG Cap Take 1 capsule (0.25 mcg total) by mouth once daily. 6/7/21 6/7/22 Yes Marci Pan MD   ergocalciferol (VITAMIN D2) 50,000 unit Cap Take 1 capsule (50,000 Units total) by mouth every 14 (fourteen) days. 8/19/21 8/19/22 Yes Marci Pan MD   famotidine (PEPCID) 20 MG tablet TAKE ONE TABLET BY MOUTH EVERY EVENING 4/26/19  Yes Cornelio Ham MD   finasteride (PROSCAR) 5 mg tablet Take 1 tablet (5 mg total) by mouth once daily. 4/4/18  Yes Sal Gallagher IV, MD   fish oil-omega-3 fatty acids 300-1,000 mg capsule Take 1 g by mouth.   Yes Historical Provider   furosemide (LASIX) 80 MG tablet Take one-half tablet (40 mg) by mouth 2 (two) times daily. 8/31/21  Yes Hany Gonsalez MD   glimepiride (AMARYL) 2 MG tablet Take 1 tablet (2 mg total) by mouth before " "breakfast. 10/19/21 10/19/22 Yes Cornelio Ham MD   insulin (LANTUS SOLOSTAR U-100 INSULIN) glargine 100 units/mL (3mL) SubQ pen Inject 35 Units into the skin 2 (two) times daily. 12/7/21 12/7/22 Yes Cornelio Ham MD   insulin aspart U-100 (NOVOLOG FLEXPEN U-100 INSULIN) 100 unit/mL (3 mL) InPn pen Inject 25 Units into the skin 3 (three) times daily with meals. 12/4/21 12/4/22 Yes Cornelio Ham MD   ketoconazole (NIZORAL) 200 mg Tab Take 0.5 tablets (100 mg total) by mouth once daily. 6/11/21  Yes Raeann Glaser NP   lancets 33 gauge Misc 1 Stick by Misc.(Non-Drug; Combo Route) route as directed. Contour lancets. Use to check BG 2-3 times daily. 9/11/13  Yes PETER Balck, TERAE   lancing device Misc 1 each by Misc.(Non-Drug; Combo Route) route 3 (three) times daily after meals. 5/12/21 5/12/22 Yes Cornelio Ham MD   magnesium oxide (MAG-OX) 400 mg (241.3 mg magnesium) tablet Take 1 tablet (400 mg total) by mouth once daily. 1/12/22  Yes Micah Muhammad MD   metOLazone (ZAROXOLYN) 2.5 MG tablet Take 1 tablet (2.5 mg total) by mouth once daily. 10/15/21 10/15/22 Yes Cornelio Ham MD   metoprolol succinate (TOPROL-XL) 25 MG 24 hr tablet Take 1 tablet (25 mg total) by mouth once daily. 3/8/21  Yes Cornelio Ham MD   multivitamin (THERAGRAN) tablet Take 1 tablet by mouth.   Yes Historical Provider   mycophenolate (CELLCEPT) 250 mg Cap Take 3 capsules (750 mg total) by mouth 2 (two) times daily.  Patient taking differently: Take 750 mg by mouth 2 (two) times daily. 1/5/2022 MMF on HOLD x 2 weeks COVID Positive. 6/11/21 6/11/22 Yes Raeann Glaser NP   pen needle, diabetic (BD INSULIN PEN NEEDLE UF SHORT) 31 gauge x 5/16" Ndle USE TO INJECT INSULIN TWICE A DAY 8/2/16  Yes Cornelio Ham MD   polyethylene glycol (GLYCOLAX) 17 gram PwPk Take by mouth. Mix and take 1 capful (17 gm) by mouth once daily as needed for constipation.   Yes Historical Provider   predniSONE (DELTASONE) 5 MG tablet Take " "1 tablet (5 mg total) by mouth once daily. 6/11/21 6/11/22 Yes Raeann Glaser NP   rosuvastatin (CRESTOR) 40 MG Tab Take 1 tablet (40 mg total) by mouth once daily in the evening. 12/31/21  Yes Cornelio Ham MD   sacubitriL-valsartan (ENTRESTO)  mg per tablet Take 1 tablet by mouth 2 (two) times daily. 12/29/21  Yes Micah Muhammad MD   tacrolimus (PROGRAF) 1 MG Cap Take 4 capsules (4 mg total) by mouth every morning AND 3 capsules (3 mg total) every evening. 12/27/21 12/27/22 Yes Marci Pan MD   tamsulosin (FLOMAX) 0.4 mg Cap Take 1 capsule (0.4 mg total) by mouth once daily. 3/8/21  Yes Sal Gallagher IV, MD   hydrALAZINE (APRESOLINE) 25 MG tablet Take 1 tablet (25 mg total) by mouth 3 (three) times daily. 12/29/21 1/25/22 Yes Micah Muhammad MD   hydrALAZINE (APRESOLINE) 10 MG tablet Take 1 tablet (10 mg total) by mouth 3 (three) times daily. 1/25/22 1/25/23  Hany Gonsalez MD        REVIEW OF SYSTEMS:  Patient has no fever, fatigue, visual changes, chest pain, edema, cough, dyspnea, nausea, vomiting, constipation, diarrhea, arthralgias, pruritis, dizziness, weakness, depression, confusion.    PHYSICAL EXAM:   height is 5' 7" (1.702 m) and weight is 126.8 kg (279 lb 8.7 oz). His blood pressure is 100/60 and his pulse is 100.   Gen: WDWN male in no apparent distress  Psych: Normal mood and affect  Skin: No rashes or ulcers  Neck: No JVD  Chest: Clear with no rales, rhonchi, wheezing with normal effort  CV: Regular with no murmurs, gallops or rubs  Abd: Soft, nontender, no distension  Ext: 1+ edema    Labs reviewed  BMP  Lab Results   Component Value Date     01/05/2022    K 3.1 (L) 01/05/2022    CL 97 01/05/2022    CO2 27 01/05/2022    BUN 69 (H) 01/05/2022    CREATININE 2.7 (H) 01/05/2022    CALCIUM 9.2 01/05/2022    ANIONGAP 16 01/05/2022    ESTGFRAFRICA 27 (A) 01/05/2022    EGFRNONAA 23 (A) 01/05/2022     Lab Results   Component Value Date    WBC 6.51 01/05/2022    HGB 11.4 (L) 01/05/2022 "    HCT 38.2 (L) 01/05/2022    MCV 86 01/05/2022     01/05/2022     No recent prograf level    SPEP and UPEP unremarkable    Cardiac study: 1/18/22 reviewed:  Conclusion  · Planar imaging demonstrates cardiac activity that is absent to that of the adjacent rib cage.  · Study is negative for TTR amyloidosis with an uptake ratio of 1.03.  · Perugini Score of 0    Echo 7/27/21 reviewed:  · The left ventricle is normal in size with concentric hypertrophy and mildly decreased systolic function.  · The estimated ejection fraction is 40%.  · Normal right ventricular size with normal right ventricular systolic function.  · Indeterminate left ventricular diastolic function.  · Moderate left atrial enlargement.        IMPRESSION AND RECOMMENDATIONS:  67 y/o male with prior h/o of kidney Transplant present for post hospital f/u:    1. Renal: Cr above baseline. H/o of CKD stage 3  ANGELITO on CKD. Likely due to overdiursis (K also low and has metabolic alkalosis)  OK to continue lasix and metolazone as pt seems still overhydrated.  H/o of DM and hTN  Was on Hd x 2.5 years before transplant    2. Immunosuppression  H/o of living related kidney transplant in 2015 form daughter  Prograf level not available, n recent labs, was ordered for am  Will chart check and inform the pt  Noted cellcept on hold due to current COVID    3. Cardiac: has diastolic and systolic CHF  Has peripheral edema  Limit slat in diet <2-3 g/day  Limit fluids < 1.5 L/day  Continue lasix 40 mg po bid  Continue metolazone 2.5 mg po qd    Reviewed cardiology note  Noted cardiac amyloid was suspected, was ruled out by above cardiac test  SPEP and UPEP unremarkable, no monoclonality    4. HTN: BP normal to low   Meds reviewed  Hydralazine was lowered from 25 to 10 mg po bid    5. COVID: current positive test  Asymptomatic  is fully vaccinated.    Plans and recommendations:  As discussed above  Total time spent 40 minutes including time needed to review the  records, the   patient evaluation, documentation, face-to-face discussion with the patient,   more than 50% of the time was spent on coordination of care and counseling.    Level V visit.  RTC 2 months  Repeat labs in     Hany Gonsalez MD

## 2022-01-26 ENCOUNTER — LAB VISIT (OUTPATIENT)
Dept: LAB | Facility: HOSPITAL | Age: 67
End: 2022-01-26
Attending: INTERNAL MEDICINE
Payer: COMMERCIAL

## 2022-01-26 DIAGNOSIS — Z94.0 KIDNEY REPLACED BY TRANSPLANT: ICD-10-CM

## 2022-01-26 DIAGNOSIS — Z94.0 KIDNEY TRANSPLANTED: ICD-10-CM

## 2022-01-26 LAB
ALBUMIN SERPL BCP-MCNC: 3.3 G/DL (ref 3.5–5.2)
ANION GAP SERPL CALC-SCNC: 9 MMOL/L (ref 8–16)
BASOPHILS # BLD AUTO: 0.04 K/UL (ref 0–0.2)
BASOPHILS NFR BLD: 0.6 % (ref 0–1.9)
BUN SERPL-MCNC: 70 MG/DL (ref 8–23)
CALCIUM SERPL-MCNC: 9.4 MG/DL (ref 8.7–10.5)
CHLORIDE SERPL-SCNC: 101 MMOL/L (ref 95–110)
CO2 SERPL-SCNC: 37 MMOL/L (ref 23–29)
CREAT SERPL-MCNC: 3.3 MG/DL (ref 0.5–1.4)
DIFFERENTIAL METHOD: ABNORMAL
EOSINOPHIL # BLD AUTO: 0 K/UL (ref 0–0.5)
EOSINOPHIL NFR BLD: 0.6 % (ref 0–8)
ERYTHROCYTE [DISTWIDTH] IN BLOOD BY AUTOMATED COUNT: 13.1 % (ref 11.5–14.5)
EST. GFR  (AFRICAN AMERICAN): 21 ML/MIN/1.73 M^2
EST. GFR  (NON AFRICAN AMERICAN): 18.2 ML/MIN/1.73 M^2
GLUCOSE SERPL-MCNC: 111 MG/DL (ref 70–110)
HCT VFR BLD AUTO: 35.6 % (ref 40–54)
HGB BLD-MCNC: 10.6 G/DL (ref 14–18)
IMM GRANULOCYTES # BLD AUTO: 0.1 K/UL (ref 0–0.04)
IMM GRANULOCYTES NFR BLD AUTO: 1.5 % (ref 0–0.5)
LYMPHOCYTES # BLD AUTO: 1 K/UL (ref 1–4.8)
LYMPHOCYTES NFR BLD: 15.1 % (ref 18–48)
MAGNESIUM SERPL-MCNC: 2.2 MG/DL (ref 1.6–2.6)
MCH RBC QN AUTO: 26.6 PG (ref 27–31)
MCHC RBC AUTO-ENTMCNC: 29.8 G/DL (ref 32–36)
MCV RBC AUTO: 89 FL (ref 82–98)
MONOCYTES # BLD AUTO: 0.8 K/UL (ref 0.3–1)
MONOCYTES NFR BLD: 12 % (ref 4–15)
NEUTROPHILS # BLD AUTO: 4.7 K/UL (ref 1.8–7.7)
NEUTROPHILS NFR BLD: 70.2 % (ref 38–73)
NRBC BLD-RTO: 0 /100 WBC
PHOSPHATE SERPL-MCNC: 3 MG/DL (ref 2.7–4.5)
PLATELET # BLD AUTO: 142 K/UL (ref 150–450)
PMV BLD AUTO: 12.2 FL (ref 9.2–12.9)
POTASSIUM SERPL-SCNC: 3.6 MMOL/L (ref 3.5–5.1)
RBC # BLD AUTO: 3.98 M/UL (ref 4.6–6.2)
SODIUM SERPL-SCNC: 147 MMOL/L (ref 136–145)
WBC # BLD AUTO: 6.69 K/UL (ref 3.9–12.7)

## 2022-01-26 PROCEDURE — 80197 ASSAY OF TACROLIMUS: CPT | Performed by: INTERNAL MEDICINE

## 2022-01-26 PROCEDURE — 36415 COLL VENOUS BLD VENIPUNCTURE: CPT | Mod: PO | Performed by: INTERNAL MEDICINE

## 2022-01-26 PROCEDURE — 80069 RENAL FUNCTION PANEL: CPT | Performed by: INTERNAL MEDICINE

## 2022-01-26 PROCEDURE — 85025 COMPLETE CBC W/AUTO DIFF WBC: CPT | Performed by: INTERNAL MEDICINE

## 2022-01-26 PROCEDURE — 83735 ASSAY OF MAGNESIUM: CPT | Performed by: INTERNAL MEDICINE

## 2022-01-27 ENCOUNTER — TELEPHONE (OUTPATIENT)
Dept: TRANSPLANT | Facility: CLINIC | Age: 67
End: 2022-01-27
Payer: COMMERCIAL

## 2022-01-27 LAB — TACROLIMUS BLD-MCNC: 5.8 NG/ML (ref 5–15)

## 2022-01-27 NOTE — PROGRESS NOTES
Na 147: needs more water intake. So behind. Cr worsening. Hydration for a week. Check renal panel in a week.

## 2022-01-27 NOTE — TELEPHONE ENCOUNTER
Patient repeated back and voice a understanding of orders and states he has been drinking less than 1 Liters of H2O daily per his cardiologist recommendations .Reviewed with patient 1/25 Nephrology note and states he should be drinking less than 1.5 liters of H2O daily.  Patient will increase fluid intake to less than 1.5 liters QD and will repeat renal Panel on 2/2/2022.   Patient voice a under  ----- Message from Marci Pan MD sent at 1/27/2022 10:04 AM CST -----  Na 147: needs more water intake. So behind. Cr worsening. Hydration for a week. Check renal panel in a week.

## 2022-01-28 ENCOUNTER — CLINICAL SUPPORT (OUTPATIENT)
Dept: AUDIOLOGY | Facility: CLINIC | Age: 67
End: 2022-01-28
Payer: COMMERCIAL

## 2022-01-28 DIAGNOSIS — Z71.89 HEARING AID CONSULTATION: Primary | ICD-10-CM

## 2022-01-28 NOTE — PROGRESS NOTES
"Mitch Whittaker was seen 01/28/2022 for a hearing aid consult to discuss hearing aids. He complains of tinnitus in his right ear that is constant and very bothersome. He describes "whoosing" sounds. He also has difficulty understanding speech in most environments and reports an active social lifestyle with his children and grandchildren. He plays television loudly. Patient has iPhone. Based on patient's hearing loss and communication needs, I recommend Advanced level technology at a minimum or consideration of Performance level technology. We discussed Care Credit. He was provided hearing aid information packet and copy of his audiogram. He will discuss with his daughter, who assists with his financial decisions, and also check with his insurance. He will call back for another consultation with his daughter when ready.         "

## 2022-02-02 ENCOUNTER — LAB VISIT (OUTPATIENT)
Dept: LAB | Facility: HOSPITAL | Age: 67
End: 2022-02-02
Attending: INTERNAL MEDICINE
Payer: COMMERCIAL

## 2022-02-02 DIAGNOSIS — Z94.0 KIDNEY REPLACED BY TRANSPLANT: ICD-10-CM

## 2022-02-02 LAB
ALBUMIN SERPL BCP-MCNC: 3.2 G/DL (ref 3.5–5.2)
ANION GAP SERPL CALC-SCNC: 16 MMOL/L (ref 8–16)
BUN SERPL-MCNC: 55 MG/DL (ref 8–23)
CALCIUM SERPL-MCNC: 9.5 MG/DL (ref 8.7–10.5)
CHLORIDE SERPL-SCNC: 95 MMOL/L (ref 95–110)
CO2 SERPL-SCNC: 29 MMOL/L (ref 23–29)
CREAT SERPL-MCNC: 2.6 MG/DL (ref 0.5–1.4)
EST. GFR  (AFRICAN AMERICAN): 28 ML/MIN/1.73 M^2
EST. GFR  (NON AFRICAN AMERICAN): 24.3 ML/MIN/1.73 M^2
GLUCOSE SERPL-MCNC: 64 MG/DL (ref 70–110)
MAGNESIUM SERPL-MCNC: 2 MG/DL (ref 1.6–2.6)
PHOSPHATE SERPL-MCNC: 3.3 MG/DL (ref 2.7–4.5)
POTASSIUM SERPL-SCNC: 3.3 MMOL/L (ref 3.5–5.1)
SODIUM SERPL-SCNC: 140 MMOL/L (ref 136–145)

## 2022-02-02 PROCEDURE — 80069 RENAL FUNCTION PANEL: CPT | Performed by: INTERNAL MEDICINE

## 2022-02-02 PROCEDURE — 83735 ASSAY OF MAGNESIUM: CPT | Performed by: INTERNAL MEDICINE

## 2022-02-02 PROCEDURE — 36415 COLL VENOUS BLD VENIPUNCTURE: CPT | Performed by: INTERNAL MEDICINE

## 2022-02-03 ENCOUNTER — TELEPHONE (OUTPATIENT)
Dept: TRANSPLANT | Facility: CLINIC | Age: 67
End: 2022-02-03
Payer: COMMERCIAL

## 2022-02-03 NOTE — TELEPHONE ENCOUNTER
Patient repeated back and voice a understanding of orders.  Repeat Renal panel and Tac level is scheduled for 3/3/2022.  ----- Message from Marci Pan MD sent at 2/3/2022 11:28 AM CST -----  Renal panel and tac level  ----- Message -----  From: Sean Lobato RN  Sent: 2/3/2022  10:54 AM CST  To: Marci Pan MD    What labs ?  ----- Message -----  From: Marci Pan MD  Sent: 2/3/2022  10:51 AM CST  To: Sean Lobato RN    Please lab in a month  ----- Message -----  From: Sean Lobato RN  Sent: 2/3/2022  10:47 AM CST  To: Marci Pan MD    You just requested a Renal Panel, He is schedule to see his Cardiologist on 2/10 and Dr Gonsalez Nephrology on 3/18.  Do you want labs next week ?   ----- Message -----  From: Marci Pan MD  Sent: 2/3/2022   7:41 AM CST  To: [facility] Post-Kidney Transplant Clinical    Low K: KCL 20 meq daily X 5 days then follow with general nephrologist.  Any tac level?

## 2022-02-03 NOTE — TELEPHONE ENCOUNTER
Patient and daughter Marybeth repeated back and voice a understanding of orders.  RX Phone into [facility] .  High K diet reviewed . Patient schedule to see Dr Arriaga cardiology on 2/10 and dr Gonsalez Nephrology on 3/18/2022.  ----- Message from Marci Pan MD sent at 2/3/2022  7:41 AM CST -----  Low K: KCL 20 meq daily X 5 days then follow with general nephrologist.  Any tac level?

## 2022-02-10 ENCOUNTER — OFFICE VISIT (OUTPATIENT)
Dept: CARDIOLOGY | Facility: CLINIC | Age: 67
End: 2022-02-10
Payer: COMMERCIAL

## 2022-02-10 VITALS
HEIGHT: 67 IN | WEIGHT: 279.13 LBS | RESPIRATION RATE: 16 BRPM | HEART RATE: 108 BPM | OXYGEN SATURATION: 99 % | DIASTOLIC BLOOD PRESSURE: 66 MMHG | BODY MASS INDEX: 43.81 KG/M2 | SYSTOLIC BLOOD PRESSURE: 94 MMHG

## 2022-02-10 DIAGNOSIS — I25.10 CORONARY ARTERY DISEASE INVOLVING NATIVE CORONARY ARTERY OF NATIVE HEART WITHOUT ANGINA PECTORIS: ICD-10-CM

## 2022-02-10 DIAGNOSIS — E11.3553 TYPE 2 DIABETES MELLITUS WITH STABLE PROLIFERATIVE RETINOPATHY OF BOTH EYES, WITH LONG-TERM CURRENT USE OF INSULIN: ICD-10-CM

## 2022-02-10 DIAGNOSIS — Z79.4 TYPE 2 DIABETES MELLITUS WITH STABLE PROLIFERATIVE RETINOPATHY OF BOTH EYES, WITH LONG-TERM CURRENT USE OF INSULIN: ICD-10-CM

## 2022-02-10 DIAGNOSIS — E11.22 HYPERTENSION ASSOCIATED WITH STAGE 3 CHRONIC KIDNEY DISEASE DUE TO TYPE 2 DIABETES MELLITUS: ICD-10-CM

## 2022-02-10 DIAGNOSIS — N18.9 ANEMIA ASSOCIATED WITH CHRONIC RENAL FAILURE: ICD-10-CM

## 2022-02-10 DIAGNOSIS — R55 SYNCOPE AND COLLAPSE: ICD-10-CM

## 2022-02-10 DIAGNOSIS — N18.30 HYPERTENSION ASSOCIATED WITH STAGE 3 CHRONIC KIDNEY DISEASE DUE TO TYPE 2 DIABETES MELLITUS: ICD-10-CM

## 2022-02-10 DIAGNOSIS — D63.1 ANEMIA ASSOCIATED WITH CHRONIC RENAL FAILURE: ICD-10-CM

## 2022-02-10 DIAGNOSIS — I12.9 HYPERTENSION ASSOCIATED WITH STAGE 3 CHRONIC KIDNEY DISEASE DUE TO TYPE 2 DIABETES MELLITUS: ICD-10-CM

## 2022-02-10 DIAGNOSIS — G47.30 SLEEP APNEA, UNSPECIFIED TYPE: ICD-10-CM

## 2022-02-10 DIAGNOSIS — E11.21 TYPE 2 DIABETES MELLITUS WITH DIABETIC NEPHROPATHY, UNSPECIFIED WHETHER LONG TERM INSULIN USE: ICD-10-CM

## 2022-02-10 DIAGNOSIS — Z86.718 HISTORY OF DVT (DEEP VEIN THROMBOSIS): ICD-10-CM

## 2022-02-10 DIAGNOSIS — I87.2 CHRONIC VENOUS INSUFFICIENCY OF LOWER EXTREMITY: ICD-10-CM

## 2022-02-10 DIAGNOSIS — R51.9 NONINTRACTABLE HEADACHE, UNSPECIFIED CHRONICITY PATTERN, UNSPECIFIED HEADACHE TYPE: ICD-10-CM

## 2022-02-10 DIAGNOSIS — Z94.0 KIDNEY TRANSPLANT STATUS, LIVING RELATED DONOR: ICD-10-CM

## 2022-02-10 DIAGNOSIS — I50.42 CHRONIC COMBINED SYSTOLIC AND DIASTOLIC CONGESTIVE HEART FAILURE: Primary | ICD-10-CM

## 2022-02-10 DIAGNOSIS — E66.01 MORBID OBESITY WITH BMI OF 45.0-49.9, ADULT: ICD-10-CM

## 2022-02-10 PROCEDURE — 3051F HG A1C>EQUAL 7.0%<8.0%: CPT | Mod: CPTII,S$GLB,, | Performed by: INTERNAL MEDICINE

## 2022-02-10 PROCEDURE — 99999 PR PBB SHADOW E&M-EST. PATIENT-LVL V: ICD-10-PCS | Mod: PBBFAC,,, | Performed by: INTERNAL MEDICINE

## 2022-02-10 PROCEDURE — 1160F PR REVIEW ALL MEDS BY PRESCRIBER/CLIN PHARMACIST DOCUMENTED: ICD-10-PCS | Mod: CPTII,S$GLB,, | Performed by: INTERNAL MEDICINE

## 2022-02-10 PROCEDURE — 3288F FALL RISK ASSESSMENT DOCD: CPT | Mod: CPTII,S$GLB,, | Performed by: INTERNAL MEDICINE

## 2022-02-10 PROCEDURE — 3051F PR MOST RECENT HEMOGLOBIN A1C LEVEL 7.0 - < 8.0%: ICD-10-PCS | Mod: CPTII,S$GLB,, | Performed by: INTERNAL MEDICINE

## 2022-02-10 PROCEDURE — 1160F RVW MEDS BY RX/DR IN RCRD: CPT | Mod: CPTII,S$GLB,, | Performed by: INTERNAL MEDICINE

## 2022-02-10 PROCEDURE — 3008F BODY MASS INDEX DOCD: CPT | Mod: CPTII,S$GLB,, | Performed by: INTERNAL MEDICINE

## 2022-02-10 PROCEDURE — 1101F PT FALLS ASSESS-DOCD LE1/YR: CPT | Mod: CPTII,S$GLB,, | Performed by: INTERNAL MEDICINE

## 2022-02-10 PROCEDURE — 99999 PR PBB SHADOW E&M-EST. PATIENT-LVL V: CPT | Mod: PBBFAC,,, | Performed by: INTERNAL MEDICINE

## 2022-02-10 PROCEDURE — 1101F PR PT FALLS ASSESS DOC 0-1 FALLS W/OUT INJ PAST YR: ICD-10-PCS | Mod: CPTII,S$GLB,, | Performed by: INTERNAL MEDICINE

## 2022-02-10 PROCEDURE — 1126F AMNT PAIN NOTED NONE PRSNT: CPT | Mod: CPTII,S$GLB,, | Performed by: INTERNAL MEDICINE

## 2022-02-10 PROCEDURE — 3074F SYST BP LT 130 MM HG: CPT | Mod: CPTII,S$GLB,, | Performed by: INTERNAL MEDICINE

## 2022-02-10 PROCEDURE — 3288F PR FALLS RISK ASSESSMENT DOCUMENTED: ICD-10-PCS | Mod: CPTII,S$GLB,, | Performed by: INTERNAL MEDICINE

## 2022-02-10 PROCEDURE — 4010F PR ACE/ARB THEARPY RXD/TAKEN: ICD-10-PCS | Mod: CPTII,S$GLB,, | Performed by: INTERNAL MEDICINE

## 2022-02-10 PROCEDURE — 3066F PR DOCUMENTATION OF TREATMENT FOR NEPHROPATHY: ICD-10-PCS | Mod: CPTII,S$GLB,, | Performed by: INTERNAL MEDICINE

## 2022-02-10 PROCEDURE — 3078F DIAST BP <80 MM HG: CPT | Mod: CPTII,S$GLB,, | Performed by: INTERNAL MEDICINE

## 2022-02-10 PROCEDURE — 1126F PR PAIN SEVERITY QUANTIFIED, NO PAIN PRESENT: ICD-10-PCS | Mod: CPTII,S$GLB,, | Performed by: INTERNAL MEDICINE

## 2022-02-10 PROCEDURE — 4010F ACE/ARB THERAPY RXD/TAKEN: CPT | Mod: CPTII,S$GLB,, | Performed by: INTERNAL MEDICINE

## 2022-02-10 PROCEDURE — 3078F PR MOST RECENT DIASTOLIC BLOOD PRESSURE < 80 MM HG: ICD-10-PCS | Mod: CPTII,S$GLB,, | Performed by: INTERNAL MEDICINE

## 2022-02-10 PROCEDURE — 3072F PR LOW RISK FOR RETINOPATHY: ICD-10-PCS | Mod: CPTII,S$GLB,, | Performed by: INTERNAL MEDICINE

## 2022-02-10 PROCEDURE — 3074F PR MOST RECENT SYSTOLIC BLOOD PRESSURE < 130 MM HG: ICD-10-PCS | Mod: CPTII,S$GLB,, | Performed by: INTERNAL MEDICINE

## 2022-02-10 PROCEDURE — 99214 OFFICE O/P EST MOD 30 MIN: CPT | Mod: S$GLB,,, | Performed by: INTERNAL MEDICINE

## 2022-02-10 PROCEDURE — 1159F MED LIST DOCD IN RCRD: CPT | Mod: CPTII,S$GLB,, | Performed by: INTERNAL MEDICINE

## 2022-02-10 PROCEDURE — 99214 PR OFFICE/OUTPT VISIT, EST, LEVL IV, 30-39 MIN: ICD-10-PCS | Mod: S$GLB,,, | Performed by: INTERNAL MEDICINE

## 2022-02-10 PROCEDURE — 3072F LOW RISK FOR RETINOPATHY: CPT | Mod: CPTII,S$GLB,, | Performed by: INTERNAL MEDICINE

## 2022-02-10 PROCEDURE — 1159F PR MEDICATION LIST DOCUMENTED IN MEDICAL RECORD: ICD-10-PCS | Mod: CPTII,S$GLB,, | Performed by: INTERNAL MEDICINE

## 2022-02-10 PROCEDURE — 3066F NEPHROPATHY DOC TX: CPT | Mod: CPTII,S$GLB,, | Performed by: INTERNAL MEDICINE

## 2022-02-10 PROCEDURE — 3008F PR BODY MASS INDEX (BMI) DOCUMENTED: ICD-10-PCS | Mod: CPTII,S$GLB,, | Performed by: INTERNAL MEDICINE

## 2022-02-10 NOTE — PROGRESS NOTES
Subjective:   Patient ID:  Mitch Whittaker is a 66 y.o. male who presents for cardiac consult of No chief complaint on file.      Follow-up  Associated symptoms include coughing. Pertinent negatives include no chest pain.     The patient came in today for cardiac consult of No chief complaint on file.      Mitch Whittaker is a 66 y.o. male with current medical conditions non obs CAD, DVT on Eliquis, Obesity,  HFrEF 40-45%, CKD, DM, MARION, HTN, HLD, ESRD s/p renal transplant presents for follow up CV eval.     3/15/19  Pt of Dr. Morris, last seen in clinic 2016. Pt presents after recent admission for CHF at [facility] -   Patient is a 63-year-old gentleman who presented to the hospital with shortness of breath. He was found to have pulmonary edema on chest x-ray and bilateral pedal edema. He was admitted for treatment of acute CHF. Echo showed reduced ejection fraction at 25-30%. He was treated with IV Lasix 80 mg twice daily that has been transitioned to Lasix p.o. Patient is not euvolemic and his creatinine is trending up for this reason I am cutting back on Lasix to 40 mg twice daily. Can follow-up with cardiology in 3 days. He sees one [facility]. He was seen by Dr. Omayra Esparza from [facility]. Recommendation is to start him on Entresto once his renal functions stabilize.Pt had edema and SOB while in bed and then went to hospital. Prior to admission he was on lasix PRN. He will see Dr. Pacheco for nephro eval, transplant nephro Dr. Dejesus.  Has been feeling good on lasix 40 BID, has been walking well overall. Does not have CPAP machine.     5/13/19  He was started on Entresto after last visit as renal function improved/stabilized. Has seen Dr. Mason recently started on CPAP again. Toprol dec to 25 mg due to hypotension. Discussed will repeat echo in 1 month to evaluate LV function, will refer for ICD if EF remains low. He woke up with right sided back pain. He has been using CPAP for about a little over a week.  Has been doing well lately overall, BP well controlled, no STEELE/LE lately. PT has mild forgetfulness but family at home has been taking care of him closely.     7/5/19  ECHO last month EF improved to 45-50%. Will not need ICD now. He has LLE pain x 4 days. He has been using Tylenol and ICY HOT for pain relief. Pain started L calf. Is taking lasix 40mg daily. Feels dizzy and lightheaded with standing at times.     7/24/19  Last week - DVT noted on u/s. Started Eliquis 10 BID x 7 days and Eliquis 5 BID after. NO bleeding issues lately with Eliquis, had minor blood after insulin shot which resolved with pressure. He has been walking more lately. Late June he had leg pain and was not walking much since, he has a habit of sitting down for a long period of time. He is on Eliquis 5 BID dosing now.     10/11/19  His recent LE u/s with non occlusive thrombus in PTV in left but no thrombus in POP vein on left. He still feels L leg swelling. No SOB. Has been using CPAP machine regularly. He feels dizzy at times and when he gets up more lightheaded, discussed needs to eval with urologist to stop flomax/proscar.     1/13/20  Last u/s with non occ thrombus L posterior Tib vein. He has been having back pain recently. He has intermittent pain when he gets up and walks around. He has started walking more and started to ride. He had had a bad sinus infection/URI and was given PCN and cough syrup.     7/15/20  Breathing overall stable but has this chronic dry cough. He has been cutting grass and felt like he had a heat stroke/exhaustion. He did not need to go to ER. His legs had some swelling but improved with rest/reclining. He has been painting and moving things around the house. No CP/SOB. He has gained weight recently as well.     1/8/2021  He has gained more weight since last visit. He has been eating too much lately. He got a new exercise bike. Discussed will refer to bariatric surgery.   ECG - sinus tachy, LAE,  "LBBB    7/16/21  BP and Hr stable. Weight 295 lbs. He had a fall a month ago trying to take out  and scrape his knee. He has more STEELE and edema. He has been drinking more water he cannot help it at times.       Hospital Course:   12/7 admitted for atypical chest pain and angelito. CP resolved. Endorses constipation taking MOM. Reports "rabbit" -like stool. Also reports taking lasix daily and was instructed by PcP to taking QOD for chf.  H/o renal transplant. Reports compliance to meds. Last seen by primary nephrologist, Dr. Pacheco. States reason for renal failure from uncontrolled BP. Also with DM. Was previously on dialysis prior to transplant. Reports urination frequency and amount unchanged. Renal u/s with no significant change, no hydronephrosis. Cr closer to baseline, now 2.7. avoid nephrotoxic agents and resume immunosuppressants. Tac lvl 10.5  12/8   Renal function improved, to baseline. After discussing with nephrology, ok to d/c and follow up o/p with medication adjustment for tacrolimus.  Endorses constipation with associated left upper abdominal quad pain. Reports passing flatus. Advised to continue bowel regimen and try to avoid dehydration.  Lesions on leg d/t chronic venous stasis. Wound care as o/p with homehealth.  He also saw Dr. Hill for possible amyloid workup and treatment.     12/29/21  ECHO in July with mild dec EF 40%. Increased Entersto dose last visit. He was admitted for CP, ANGELITO. He feels well now, stable. Takes metoloazone daily but at night discussed to take in Am, and takes lasix 40 BID.     2/10/22  Study is negative for TTR amyloidosis with an uptake ratio of 1.03. Per Dr. Hill - I would recommend strain imaging on repeat echo  If Hematologist is concerned re: AL then heart biopsy indicated unless other less invasive measures demonstrate amyloidosis.      He had an episode of syncope last week while he got out of car, did not go to ER. He regained conc and was with " daughter. Has been having headaches at times as well.     Patient feels no chest pain, no PND, no palpitation,  no syncope, no CNS symptoms.    Patient has dec exercise tolerance.    Patient is compliant with medications.    Results for orders placed during the hospital encounter of 07/27/21    Echo    Interpretation Summary  · The left ventricle is normal in size with concentric hypertrophy and mildly decreased systolic function.  · The estimated ejection fraction is 40%.  · Normal right ventricular size with normal right ventricular systolic function.  · Indeterminate left ventricular diastolic function.  · Moderate left atrial enlargement.      LE US 12/2019  Age Indeterminate non occlusive thrombus of the Posterior Tibial Vein.  CONCLUSIONS   Technically difficult study.   This document has been electronically    SIGNED BY: Fabiana Saleem MD On: 12/18/2019 17:18    RIGHT:  No evidence of Right lower extremity DVT.    LEFT:  Age Indeterminate DVT of the Posterior Tibial Vein.  In comparison to LEV done 7/19 there is no longer thrombus in the POP vein on the left, but there still appears to be non occlusive thrombus seen in the PTV on the left.  CONCLUSIONS   Technically difficult study.   This document has been electronically    SIGNED BY: Darius Azevedo MD On: 09/23/2019 17:20      Nuclear Stress 06/20/2018   Nuclear Quantitative Functional Analysis:   LVEF: 46 %  LVED Volume: 144 ml  LVES Volume: 91 ml    Impression: NORMAL MYOCARDIAL PERFUSION  1. The perfusion scan is free of evidence for myocardial ischemia or injury.   2. Resting wall motion is physiologic.   3. There is resting LV dysfunction with a reduced ejection fraction of 46 %.   4. The ventricular volumes are normal at rest and stress.   5. The extracardiac distribution of radioactivity is normal.       Past Medical History:   Diagnosis Date    Acquired renal cyst of left kidney     Anemia associated with chronic renal failure     CAD (coronary  artery disease)     nonobstructive [facility]     CHF (congestive heart failure)     Chronic immunosuppression with Prograf and MMF 2015    Chronic venous insufficiency of lower extremity     CKD (chronic kidney disease) stage 3, GFR 30-59 ml/min     Diabetic retinopathy     DM (diabetes mellitus), type 2 with complications 1994    Edema     End stage kidney disease     s/p transplant, doing well    Gallbladder polyp     Heart failure, diastolic, due to HTN     Hemodialysis status     off since transplant    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 RNA NEGATIVE 2015    History of colon polyps     HPTH (hyperparathyroidism)     Hyperlipidemia     Hypertension associated with stage 3 chronic kidney disease due to type 2 diabetes mellitus     LBBB (left bundle branch block) 2021    Morbid obesity with BMI of 45.0-49.9, adult     PCO (posterior capsular opacification), left 3/4/2019    Proteinuria     resolved s/p transplant    S/P kidney transplant     Sleep apnea     Type 2 diabetes, uncontrolled, with retinopathy     Type II diabetes mellitus with renal manifestations        Past Surgical History:   Procedure Laterality Date    CARDIAC CATHETERIZATION      normal coronary    COLONOSCOPY N/A 2018    Procedure: COLONOSCOPY;  Surgeon: Chava Ronquillo MD;  Location: [facility];  Service: Endoscopy;  Laterality: N/A;    KIDNEY TRANSPLANT      RETINAL LASER PROCEDURE         Social History     Tobacco Use    Smoking status: Former Smoker     Quit date: 2013     Years since quittin.7    Smokeless tobacco: Former User     Quit date: 2013    Tobacco comment: used marijuana since [id number], stopped after started dialysis   Substance Use Topics    Alcohol use: No     Alcohol/week: 0.0 standard drinks    Drug use: No     Comment:         Family History   Problem Relation Age of Onset    Diabetes Mother     Hypertension Mother      "Heart failure Mother     Kidney disease Sister         ESRD    Diabetes Sister     Diabetes Maternal Grandmother     Cancer Neg Hx        Patient's Medications   New Prescriptions    No medications on file   Previous Medications    APIXABAN (ELIQUIS) 5 MG TAB    Take 1 tablet (5 mg total) by mouth 2 (two) times daily.    ASPIRIN (ECOTRIN) 81 MG EC TABLET    Take 1 tablet by mouth Daily.     BD ULTRA-FINE MINI PEN NEEDLE 31 GAUGE X 3/16" NDLE    Use to inject insulin as needed up to 4 times daily    BISACODYL (DULCOLAX) 5 MG EC TABLET    Take 5 mg by mouth daily as needed for Constipation.    BLOOD SUGAR DIAGNOSTIC STRP    Use to test four times per day    CALCITRIOL (ROCALTROL) 0.25 MCG CAP    Take 1 capsule (0.25 mcg total) by mouth once daily.    ERGOCALCIFEROL (VITAMIN D2) 50,000 UNIT CAP    Take 1 capsule (50,000 Units total) by mouth every 14 (fourteen) days.    FAMOTIDINE (PEPCID) 20 MG TABLET    TAKE ONE TABLET BY MOUTH EVERY EVENING    FINASTERIDE (PROSCAR) 5 MG TABLET    Take 1 tablet (5 mg total) by mouth once daily.    FISH OIL-OMEGA-3 FATTY ACIDS 300-1,000 MG CAPSULE    Take 1 g by mouth.    FUROSEMIDE (LASIX) 80 MG TABLET    Take one-half tablet (40 mg) by mouth 2 (two) times daily.    GLIMEPIRIDE (AMARYL) 2 MG TABLET    Take 1 tablet (2 mg total) by mouth before breakfast.    HYDRALAZINE (APRESOLINE) 10 MG TABLET    Take 1 tablet (10 mg total) by mouth 3 (three) times daily.    INSULIN (LANTUS SOLOSTAR U-100 INSULIN) GLARGINE 100 UNITS/ML (3ML) SUBQ PEN    Inject 35 Units into the skin 2 (two) times daily.    INSULIN ASPART U-100 (NOVOLOG FLEXPEN U-100 INSULIN) 100 UNIT/ML (3 ML) INPN PEN    Inject 25 Units into the skin 3 (three) times daily with meals.    KETOCONAZOLE (NIZORAL) 200 MG TAB    Take 0.5 tablets (100 mg total) by mouth once daily.    LANCETS 33 GAUGE MISC    1 Stick by Misc.(Non-Drug; Combo Route) route as directed. Contour lancets. Use to check BG 2-3 times daily.    LANCING DEVICE " "MISC    1 each by Misc.(Non-Drug; Combo Route) route 3 (three) times daily after meals.    MAGNESIUM OXIDE (MAG-OX) 400 MG (241.3 MG MAGNESIUM) TABLET    Take 1 tablet (400 mg total) by mouth once daily.    METOLAZONE (ZAROXOLYN) 2.5 MG TABLET    Take 1 tablet (2.5 mg total) by mouth once daily.    METOPROLOL SUCCINATE (TOPROL-XL) 25 MG 24 HR TABLET    Take 1 tablet (25 mg total) by mouth once daily.    MULTIVITAMIN (THERAGRAN) TABLET    Take 1 tablet by mouth.    MYCOPHENOLATE (CELLCEPT) 250 MG CAP    Take 3 capsules (750 mg total) by mouth 2 (two) times daily.    PEN NEEDLE, DIABETIC (BD INSULIN PEN NEEDLE UF SHORT) 31 GAUGE X 5/16" NDLE    USE TO INJECT INSULIN TWICE A DAY    POLYETHYLENE GLYCOL (GLYCOLAX) 17 GRAM PWPK    Take by mouth. Mix and take 1 capful (17 gm) by mouth once daily as needed for constipation.    POTASSIUM CHLORIDE SA (K-DUR,KLOR-CON) 20 MEQ TABLET    Take 1 tablet by mouth once daily for 5 days    PREDNISONE (DELTASONE) 5 MG TABLET    Take 1 tablet (5 mg total) by mouth once daily.    ROSUVASTATIN (CRESTOR) 40 MG TAB    Take 1 tablet (40 mg total) by mouth once daily in the evening.    SACUBITRIL-VALSARTAN (ENTRESTO)  MG PER TABLET    Take 1 tablet by mouth 2 (two) times daily.    TACROLIMUS (PROGRAF) 1 MG CAP    Take 4 capsules (4 mg total) by mouth every morning AND 3 capsules (3 mg total) every evening.    TAMSULOSIN (FLOMAX) 0.4 MG CAP    Take 1 capsule (0.4 mg total) by mouth once daily.   Modified Medications    No medications on file   Discontinued Medications    No medications on file       Review of Systems   Constitutional: Positive for malaise/fatigue.   HENT: Negative.    Eyes: Negative.    Respiratory: Positive for cough and shortness of breath.    Cardiovascular: Positive for leg swelling. Negative for chest pain and palpitations.   Gastrointestinal: Negative.    Genitourinary: Negative.    Musculoskeletal: Positive for back pain.   Skin: Negative.    Neurological: " Positive for dizziness.   Endo/Heme/Allergies: Negative.    Psychiatric/Behavioral: Negative.    All 12 systems otherwise negative.      Wt Readings from Last 3 Encounters:   01/25/22 126.8 kg (279 lb 8.7 oz)   01/19/22 127.5 kg (281 lb 1.4 oz)   01/05/22 128.4 kg (283 lb)     Temp Readings from Last 3 Encounters:   01/06/22 98.1 °F (36.7 °C) (Tympanic)   01/05/22 100 °F (37.8 °C) (Oral)   12/29/21 98.2 °F (36.8 °C) (Temporal)     BP Readings from Last 3 Encounters:   01/25/22 100/60   01/19/22 138/80   01/06/22 (!) 107/52     Pulse Readings from Last 3 Encounters:   01/25/22 100   01/19/22 93   01/06/22 91       There were no vitals taken for this visit.    Objective:   Physical Exam  Vitals and nursing note reviewed.   Constitutional:       General: He is not in acute distress.     Appearance: He is well-developed. He is not diaphoretic.   HENT:      Head: Normocephalic and atraumatic.      Nose: Nose normal.   Eyes:      General: No scleral icterus.     Conjunctiva/sclera: Conjunctivae normal.   Neck:      Thyroid: No thyromegaly.      Vascular: No JVD.   Cardiovascular:      Rate and Rhythm: Normal rate and regular rhythm.      Heart sounds: S1 normal and S2 normal. No murmur heard.  No friction rub. No gallop. No S3 or S4 sounds.    Pulmonary:      Effort: Pulmonary effort is normal. No respiratory distress.      Breath sounds: Normal breath sounds. No stridor. No wheezing or rales.   Chest:      Chest wall: No tenderness.   Abdominal:      General: Bowel sounds are normal. There is no distension.      Palpations: Abdomen is soft. There is no mass.      Tenderness: There is no abdominal tenderness. There is no rebound.   Genitourinary:     Comments: Deferred  Musculoskeletal:         General: No tenderness or deformity. Normal range of motion.      Cervical back: Normal range of motion and neck supple.      Right lower leg: Edema present.      Left lower leg: Edema present.   Lymphadenopathy:      Cervical: No  cervical adenopathy.   Skin:     General: Skin is warm and dry.      Coloration: Skin is not pale.      Findings: No erythema or rash.   Neurological:      Mental Status: He is alert and oriented to person, place, and time.      Motor: No abnormal muscle tone.      Coordination: Coordination normal.   Psychiatric:         Behavior: Behavior normal.         Thought Content: Thought content normal.         Judgment: Judgment normal.         Lab Results   Component Value Date     02/02/2022    K 3.3 (L) 02/02/2022    CL 95 02/02/2022    CO2 29 02/02/2022    BUN 55 (H) 02/02/2022    CREATININE 2.6 (H) 02/02/2022    GLU 64 (L) 02/02/2022    HGBA1C 7.5 (H) 12/17/2021    MG 2.0 02/02/2022    AST 20 01/05/2022    ALT 17 01/05/2022    ALBUMIN 3.2 (L) 02/02/2022    PROT 7.1 01/05/2022    BILITOT 0.6 01/05/2022    WBC 6.69 01/26/2022    HGB 10.6 (L) 01/26/2022    HCT 35.6 (L) 01/26/2022    HCT 36 06/12/2015    MCV 89 01/26/2022     (L) 01/26/2022    INR 1.0 06/18/2015    TSH 0.643 12/20/2021    CHOL 188 10/18/2021    HDL 59 10/18/2021    LDLCALC 106.6 10/18/2021    TRIG 112 10/18/2021    BNP 41 01/05/2022     Assessment:      1. Chronic combined systolic and diastolic congestive heart failure    2. 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, with long-term current use of insulin    3. Chronic venous insufficiency of lower extremity    4. Living-related donor kidney transplant (daughter) - 6/12/15    5. Type 2 diabetes mellitus with diabetic nephropathy, unspecified whether long term insulin use    6. Hypertension associated with stage 3 chronic kidney disease due to type 2 diabetes mellitus    7. Coronary artery disease involving native coronary artery of native heart without angina pectoris    8. Morbid obesity with BMI of 45.0-49.9, adult    9. Sleep apnea, unspecified type    10. Anemia associated with chronic renal failure    11. History of DVT (deep vein thrombosis)         Plan:    1. Chronic CHF EF 40-45% ; neg for TTR amyloidosis  - cont lasix, and extra PRN  - titrate Toprol and Entresto;  - cont low salt diet, fluid restriction  - increase Entresto to max dose  - ECHO ordered - pending    2. HTN with mild edema; low normal BP   - cont meds for afterload reduction   - low salt diet  - rec compression stockings  - stop hydralazine     3. HLD  - cont statin    4. ESRD s/p Transplant with CKD  - f/u with nephro, repeat labs and adjust tx per nephro    5. CAD, non obs  - cont meds  - stress 6/2018 negative    6. MARION  -cont CPAP    7. Obesity  - rec weight loss with diet/exercise   - will refer to bariatric surgery     8. H/o DVT  - cont Eliquis 5 BID  - non occlusive thrombus in PTV in left but no thrombus in POP vein on left.    9. Dizziness, had syncope recently with headaches  - refer to Neuro   - likeky sec to Flomax and Proscar, can discuss with urology  - monitor BP as well    10. DM2 - A1c -9.3 --> 8.0 --> 7.5  - cont meds per PCP, improving towards goal      Thank you for allowing me to participate in this patient's care. Please do not hesitate to contact me with any questions or concerns. Consult note has been forwarded to the referral physician.

## 2022-02-18 ENCOUNTER — HOSPITAL ENCOUNTER (OUTPATIENT)
Dept: CARDIOLOGY | Facility: HOSPITAL | Age: 67
Discharge: HOME | End: 2022-02-18
Attending: INTERNAL MEDICINE
Payer: COMMERCIAL

## 2022-02-18 ENCOUNTER — TELEPHONE (OUTPATIENT)
Dept: TRANSPLANT | Facility: CLINIC | Age: 67
End: 2022-02-18
Payer: COMMERCIAL

## 2022-02-18 VITALS
SYSTOLIC BLOOD PRESSURE: 94 MMHG | DIASTOLIC BLOOD PRESSURE: 66 MMHG | HEART RATE: 95 BPM | HEIGHT: 67 IN | WEIGHT: 279 LBS | BODY MASS INDEX: 43.79 KG/M2

## 2022-02-18 DIAGNOSIS — E11.22 HYPERTENSION ASSOCIATED WITH STAGE 3 CHRONIC KIDNEY DISEASE DUE TO TYPE 2 DIABETES MELLITUS: ICD-10-CM

## 2022-02-18 DIAGNOSIS — I12.9 HYPERTENSION ASSOCIATED WITH STAGE 3 CHRONIC KIDNEY DISEASE DUE TO TYPE 2 DIABETES MELLITUS: ICD-10-CM

## 2022-02-18 DIAGNOSIS — E11.3553 TYPE 2 DIABETES MELLITUS WITH STABLE PROLIFERATIVE RETINOPATHY OF BOTH EYES, WITH LONG-TERM CURRENT USE OF INSULIN: ICD-10-CM

## 2022-02-18 DIAGNOSIS — I50.42 CHRONIC COMBINED SYSTOLIC AND DIASTOLIC CONGESTIVE HEART FAILURE: Primary | ICD-10-CM

## 2022-02-18 DIAGNOSIS — Z79.4 TYPE 2 DIABETES MELLITUS WITH STABLE PROLIFERATIVE RETINOPATHY OF BOTH EYES, WITH LONG-TERM CURRENT USE OF INSULIN: ICD-10-CM

## 2022-02-18 DIAGNOSIS — Z86.718 HISTORY OF DVT (DEEP VEIN THROMBOSIS): ICD-10-CM

## 2022-02-18 DIAGNOSIS — E66.01 MORBID OBESITY WITH BMI OF 45.0-49.9, ADULT: ICD-10-CM

## 2022-02-18 DIAGNOSIS — I25.10 CORONARY ARTERY DISEASE INVOLVING NATIVE CORONARY ARTERY OF NATIVE HEART WITHOUT ANGINA PECTORIS: ICD-10-CM

## 2022-02-18 DIAGNOSIS — I50.42 CHRONIC COMBINED SYSTOLIC AND DIASTOLIC CONGESTIVE HEART FAILURE: ICD-10-CM

## 2022-02-18 DIAGNOSIS — N18.30 HYPERTENSION ASSOCIATED WITH STAGE 3 CHRONIC KIDNEY DISEASE DUE TO TYPE 2 DIABETES MELLITUS: ICD-10-CM

## 2022-02-18 DIAGNOSIS — G47.30 SLEEP APNEA, UNSPECIFIED TYPE: ICD-10-CM

## 2022-02-18 LAB
AORTIC ROOT ANNULUS: 3.31 CM
ASCENDING AORTA: 3.29 CM
AV INDEX (PROSTH): 0.68
AV MEAN GRADIENT: 4 MMHG
AV PEAK GRADIENT: 7 MMHG
AV VALVE AREA: 2.81 CM2
AV VELOCITY RATIO: 0.62
BSA FOR ECHO PROCEDURE: 2.45 M2
CV ECHO LV RWT: 0.57 CM
DOP CALC AO PEAK VEL: 1.3 M/S
DOP CALC AO VTI: 20.7 CM
DOP CALC LVOT AREA: 4.2 CM2
DOP CALC LVOT DIAMETER: 2.3 CM
DOP CALC LVOT PEAK VEL: 0.8 M/S
DOP CALC LVOT STROKE VOLUME: 58.14 CM3
DOP CALC RVOT PEAK VEL: 0.82 M/S
DOP CALC RVOT VTI: 13.7 CM
DOP CALCLVOT PEAK VEL VTI: 14 CM
ECHO EF ESTIMATED: 47 %
ECHO LV POSTERIOR WALL: 1.41 CM (ref 0.6–1.1)
EJECTION FRACTION: 40 %
FRACTIONAL SHORTENING: 24 % (ref 28–44)
INTERVENTRICULAR SEPTUM: 1.39 CM (ref 0.6–1.1)
LA MAJOR: 5.61 CM
LA MINOR: 5.49 CM
LA WIDTH: 4.25 CM
LEFT ATRIUM SIZE: 4.61 CM
LEFT ATRIUM VOLUME INDEX MOD: 23.5 ML/M2
LEFT ATRIUM VOLUME INDEX: 39.7 ML/M2
LEFT ATRIUM VOLUME MOD: 54.84 CM3
LEFT ATRIUM VOLUME: 92.42 CM3
LEFT INTERNAL DIMENSION IN SYSTOLE: 3.76 CM (ref 2.1–4)
LEFT VENTRICLE DIASTOLIC VOLUME INDEX: 49.41 ML/M2
LEFT VENTRICLE DIASTOLIC VOLUME: 115.12 ML
LEFT VENTRICLE MASS INDEX: 123 G/M2
LEFT VENTRICLE SYSTOLIC VOLUME INDEX: 25.9 ML/M2
LEFT VENTRICLE SYSTOLIC VOLUME: 60.44 ML
LEFT VENTRICULAR INTERNAL DIMENSION IN DIASTOLE: 4.94 CM (ref 3.5–6)
LEFT VENTRICULAR MASS: 286.09 G
LVOT MG: 1.58 MMHG
LVOT MV: 0.59 CM/S
PISA TR MAX VEL: 2.45 M/S
PV MEAN GRADIENT: 1.47 MMHG
PV PEAK VELOCITY: 1.02 CM/S
RA PRESSURE: 3 MMHG
RIGHT VENTRICULAR END-DIASTOLIC DIMENSION: 3.96 CM
SINUS: 3.46 CM
STJ: 2.72 CM
TR MAX PG: 24 MMHG
TV REST PULMONARY ARTERY PRESSURE: 27 MMHG

## 2022-02-18 PROCEDURE — 25500020 PHARM REV CODE 255: Performed by: INTERNAL MEDICINE

## 2022-02-18 PROCEDURE — 93306 TTE W/DOPPLER COMPLETE: CPT | Mod: 26,,, | Performed by: INTERNAL MEDICINE

## 2022-02-18 PROCEDURE — C8929 TTE W OR WO FOL WCON,DOPPLER: HCPCS

## 2022-02-18 PROCEDURE — 93306 ECHO (CUPID ONLY): ICD-10-PCS | Mod: 26,,, | Performed by: INTERNAL MEDICINE

## 2022-02-18 RX ORDER — SODIUM CHLORIDE 0.9 % (FLUSH) 0.9 %
10 SYRINGE (ML) INJECTION
Status: DISCONTINUED | OUTPATIENT
Start: 2022-02-18 | End: 2022-03-18

## 2022-02-18 RX ORDER — TAMSULOSIN HYDROCHLORIDE 0.4 MG/1
1 CAPSULE ORAL DAILY
Qty: 30 CAPSULE | Refills: 11 | Status: SHIPPED | OUTPATIENT
Start: 2022-02-18 | End: 2023-02-01 | Stop reason: SDUPTHER

## 2022-02-18 RX ADMIN — HUMAN ALBUMIN MICROSPHERES AND PERFLUTREN 0.66 MG: 10; .22 INJECTION, SOLUTION INTRAVENOUS at 01:02

## 2022-02-18 NOTE — TELEPHONE ENCOUNTER
This Rx Request does not qualify for assessment with the OR.    Please review protocol details and the Care Due Message for extra clinical information    Reasons Rx Request may be deferred:  Labs/Vitals overdue  Labs/Vitals abnormal  Patient has been seen in the ED/Hospital since the last PCP visit  Medication is non-delegated  Provider is a non-participating provider

## 2022-02-19 RX ORDER — METOPROLOL SUCCINATE 25 MG/1
25 TABLET, EXTENDED RELEASE ORAL DAILY
Qty: 30 TABLET | Refills: 11 | Status: SHIPPED | OUTPATIENT
Start: 2022-02-19 | End: 2023-02-01 | Stop reason: SDUPTHER

## 2022-02-25 ENCOUNTER — OFFICE VISIT (OUTPATIENT)
Dept: TRANSPLANT | Facility: CLINIC | Age: 67
End: 2022-02-25
Payer: COMMERCIAL

## 2022-02-25 ENCOUNTER — HOSPITAL ENCOUNTER (EMERGENCY)
Facility: HOSPITAL | Age: 67
Discharge: HOME | End: 2022-02-25
Attending: EMERGENCY MEDICINE
Payer: COMMERCIAL

## 2022-02-25 ENCOUNTER — HOSPITAL ENCOUNTER (OUTPATIENT)
Dept: CARDIOLOGY | Facility: HOSPITAL | Age: 67
Discharge: HOME | End: 2022-02-25
Payer: COMMERCIAL

## 2022-02-25 VITALS
TEMPERATURE: 98 F | HEART RATE: 96 BPM | SYSTOLIC BLOOD PRESSURE: 156 MMHG | DIASTOLIC BLOOD PRESSURE: 64 MMHG | RESPIRATION RATE: 21 BRPM | OXYGEN SATURATION: 100 % | BODY MASS INDEX: 43.46 KG/M2 | WEIGHT: 276.88 LBS | HEIGHT: 67 IN

## 2022-02-25 VITALS
HEART RATE: 103 BPM | HEIGHT: 67 IN | WEIGHT: 276.69 LBS | DIASTOLIC BLOOD PRESSURE: 40 MMHG | BODY MASS INDEX: 43.43 KG/M2 | SYSTOLIC BLOOD PRESSURE: 70 MMHG | OXYGEN SATURATION: 97 %

## 2022-02-25 DIAGNOSIS — D63.1 ANEMIA ASSOCIATED WITH CHRONIC RENAL FAILURE: ICD-10-CM

## 2022-02-25 DIAGNOSIS — G47.33 OSA (OBSTRUCTIVE SLEEP APNEA): ICD-10-CM

## 2022-02-25 DIAGNOSIS — E11.8 DM (DIABETES MELLITUS), TYPE 2 WITH COMPLICATIONS: ICD-10-CM

## 2022-02-25 DIAGNOSIS — I95.9 HYPOTENSION, UNSPECIFIED HYPOTENSION TYPE: ICD-10-CM

## 2022-02-25 DIAGNOSIS — N18.9 ANEMIA ASSOCIATED WITH CHRONIC RENAL FAILURE: ICD-10-CM

## 2022-02-25 DIAGNOSIS — Z86.718 HISTORY OF DVT (DEEP VEIN THROMBOSIS): ICD-10-CM

## 2022-02-25 DIAGNOSIS — I50.42 CHRONIC COMBINED SYSTOLIC AND DIASTOLIC CONGESTIVE HEART FAILURE: ICD-10-CM

## 2022-02-25 DIAGNOSIS — I87.2 CHRONIC VENOUS INSUFFICIENCY OF LOWER EXTREMITY: ICD-10-CM

## 2022-02-25 DIAGNOSIS — R53.1 WEAKNESS: ICD-10-CM

## 2022-02-25 DIAGNOSIS — E66.01 MORBID OBESITY: ICD-10-CM

## 2022-02-25 DIAGNOSIS — I50.42 CHRONIC COMBINED SYSTOLIC AND DIASTOLIC CONGESTIVE HEART FAILURE: Primary | ICD-10-CM

## 2022-02-25 DIAGNOSIS — I44.7 LBBB (LEFT BUNDLE BRANCH BLOCK): ICD-10-CM

## 2022-02-25 DIAGNOSIS — I95.9 HYPOTENSION, UNSPECIFIED HYPOTENSION TYPE: Primary | ICD-10-CM

## 2022-02-25 DIAGNOSIS — E16.2 HYPOGLYCEMIA: ICD-10-CM

## 2022-02-25 DIAGNOSIS — I10 HYPERTENSION, UNSPECIFIED TYPE: ICD-10-CM

## 2022-02-25 DIAGNOSIS — N18.30 STAGE 3 CHRONIC KIDNEY DISEASE, UNSPECIFIED WHETHER STAGE 3A OR 3B CKD: ICD-10-CM

## 2022-02-25 DIAGNOSIS — Z94.0 KIDNEY TRANSPLANT STATUS, LIVING RELATED DONOR: ICD-10-CM

## 2022-02-25 DIAGNOSIS — I25.10 CORONARY ARTERY DISEASE, UNSPECIFIED VESSEL OR LESION TYPE, UNSPECIFIED WHETHER ANGINA PRESENT, UNSPECIFIED WHETHER NATIVE OR TRANSPLANTED HEART: ICD-10-CM

## 2022-02-25 LAB
ACANTHOCYTES BLD QL SMEAR: PRESENT
ALBUMIN SERPL BCP-MCNC: 3.4 G/DL (ref 3.5–5.2)
ALLENS TEST: ABNORMAL
ALP SERPL-CCNC: 61 U/L (ref 55–135)
ALT SERPL W/O P-5'-P-CCNC: 9 U/L (ref 10–44)
AMMONIA PLAS-SCNC: 42 UMOL/L (ref 10–50)
ANION GAP SERPL CALC-SCNC: 13 MMOL/L (ref 8–16)
ANISOCYTOSIS BLD QL SMEAR: SLIGHT
AST SERPL-CCNC: 12 U/L (ref 10–40)
BASOPHILS # BLD AUTO: 0.03 K/UL (ref 0–0.2)
BASOPHILS NFR BLD: 0.3 % (ref 0–1.9)
BILIRUB SERPL-MCNC: 0.5 MG/DL (ref 0.1–1)
BNP SERPL-MCNC: 29 PG/ML (ref 0–99)
BUN SERPL-MCNC: 80 MG/DL (ref 8–23)
CALCIUM SERPL-MCNC: 8.9 MG/DL (ref 8.7–10.5)
CHLORIDE SERPL-SCNC: 99 MMOL/L (ref 95–110)
CK MB SERPL-MCNC: 1.5 NG/ML (ref 0.1–6.5)
CK MB SERPL-RTO: 0.7 % (ref 0–5)
CK SERPL-CCNC: 200 U/L (ref 20–200)
CK SERPL-CCNC: 207 U/L (ref 20–200)
CO2 SERPL-SCNC: 32 MMOL/L (ref 23–29)
CREAT SERPL-MCNC: 3.9 MG/DL (ref 0.5–1.4)
CTP QC/QA: YES
DELSYS: ABNORMAL
DIFFERENTIAL METHOD: ABNORMAL
EOSINOPHIL # BLD AUTO: 0 K/UL (ref 0–0.5)
EOSINOPHIL NFR BLD: 0.4 % (ref 0–8)
ERYTHROCYTE [DISTWIDTH] IN BLOOD BY AUTOMATED COUNT: 13.2 % (ref 11.5–14.5)
EST. GFR  (AFRICAN AMERICAN): 17 ML/MIN/1.73 M^2
EST. GFR  (NON AFRICAN AMERICAN): 15 ML/MIN/1.73 M^2
GLUCOSE SERPL-MCNC: 44 MG/DL (ref 70–110)
HCO3 UR-SCNC: 35.3 MMOL/L (ref 24–28)
HCT VFR BLD AUTO: 34.8 % (ref 40–54)
HGB BLD-MCNC: 10.8 G/DL (ref 14–18)
HYPOCHROMIA BLD QL SMEAR: ABNORMAL
IMM GRANULOCYTES # BLD AUTO: 0.08 K/UL (ref 0–0.04)
IMM GRANULOCYTES NFR BLD AUTO: 0.7 % (ref 0–0.5)
LACTATE SERPL-SCNC: 1.7 MMOL/L (ref 0.5–2.2)
LYMPHOCYTES # BLD AUTO: 1.2 K/UL (ref 1–4.8)
LYMPHOCYTES NFR BLD: 11.1 % (ref 18–48)
MAGNESIUM SERPL-MCNC: 2 MG/DL (ref 1.6–2.6)
MCH RBC QN AUTO: 26.9 PG (ref 27–31)
MCHC RBC AUTO-ENTMCNC: 31 G/DL (ref 32–36)
MCV RBC AUTO: 87 FL (ref 82–98)
MODE: ABNORMAL
MONOCYTES # BLD AUTO: 0.9 K/UL (ref 0.3–1)
MONOCYTES NFR BLD: 8.1 % (ref 4–15)
NEUTROPHILS # BLD AUTO: 8.6 K/UL (ref 1.8–7.7)
NEUTROPHILS NFR BLD: 79.4 % (ref 38–73)
NRBC BLD-RTO: 0 /100 WBC
OVALOCYTES BLD QL SMEAR: ABNORMAL
PCO2 BLDA: 50.1 MMHG (ref 35–45)
PH SMN: 7.46 [PH] (ref 7.35–7.45)
PLATELET # BLD AUTO: 190 K/UL (ref 150–450)
PLATELET BLD QL SMEAR: ABNORMAL
PMV BLD AUTO: 11.5 FL (ref 9.2–12.9)
PO2 BLDA: 74 MMHG (ref 80–100)
POC BE: 11 MMOL/L
POC SATURATED O2: 95 % (ref 95–100)
POCT GLUCOSE: 131 MG/DL (ref 70–110)
POCT GLUCOSE: 43 MG/DL (ref 70–110)
POCT GLUCOSE: 84 MG/DL (ref 70–110)
POIKILOCYTOSIS BLD QL SMEAR: SLIGHT
POLYCHROMASIA BLD QL SMEAR: ABNORMAL
POTASSIUM SERPL-SCNC: 3.2 MMOL/L (ref 3.5–5.1)
PROT SERPL-MCNC: 6.1 G/DL (ref 6–8.4)
RBC # BLD AUTO: 4.01 M/UL (ref 4.6–6.2)
SAMPLE: ABNORMAL
SARS-COV-2 RDRP RESP QL NAA+PROBE: NEGATIVE
SITE: ABNORMAL
SODIUM SERPL-SCNC: 144 MMOL/L (ref 136–145)
STOMATOCYTES BLD QL SMEAR: PRESENT
TROPONIN I SERPL DL<=0.01 NG/ML-MCNC: 0.06 NG/ML (ref 0–0.03)
WBC # BLD AUTO: 10.81 K/UL (ref 3.9–12.7)

## 2022-02-25 PROCEDURE — 1159F PR MEDICATION LIST DOCUMENTED IN MEDICAL RECORD: ICD-10-PCS | Mod: CPTII,S$GLB,, | Performed by: NURSE PRACTITIONER

## 2022-02-25 PROCEDURE — 93010 ELECTROCARDIOGRAM REPORT: CPT | Mod: ,,, | Performed by: INTERNAL MEDICINE

## 2022-02-25 PROCEDURE — 83605 ASSAY OF LACTIC ACID: CPT | Performed by: EMERGENCY MEDICINE

## 2022-02-25 PROCEDURE — 93005 ELECTROCARDIOGRAM TRACING: CPT

## 2022-02-25 PROCEDURE — 84484 ASSAY OF TROPONIN QUANT: CPT | Performed by: EMERGENCY MEDICINE

## 2022-02-25 PROCEDURE — 3074F SYST BP LT 130 MM HG: CPT | Mod: CPTII,S$GLB,, | Performed by: NURSE PRACTITIONER

## 2022-02-25 PROCEDURE — 96360 HYDRATION IV INFUSION INIT: CPT

## 2022-02-25 PROCEDURE — 99285 EMERGENCY DEPT VISIT HI MDM: CPT | Mod: 25

## 2022-02-25 PROCEDURE — 3074F PR MOST RECENT SYSTOLIC BLOOD PRESSURE < 130 MM HG: ICD-10-PCS | Mod: CPTII,S$GLB,, | Performed by: NURSE PRACTITIONER

## 2022-02-25 PROCEDURE — 3078F DIAST BP <80 MM HG: CPT | Mod: CPTII,S$GLB,, | Performed by: NURSE PRACTITIONER

## 2022-02-25 PROCEDURE — 3051F HG A1C>EQUAL 7.0%<8.0%: CPT | Mod: CPTII,S$GLB,, | Performed by: NURSE PRACTITIONER

## 2022-02-25 PROCEDURE — 25000003 PHARM REV CODE 250: Performed by: EMERGENCY MEDICINE

## 2022-02-25 PROCEDURE — 3008F PR BODY MASS INDEX (BMI) DOCUMENTED: ICD-10-PCS | Mod: CPTII,S$GLB,, | Performed by: NURSE PRACTITIONER

## 2022-02-25 PROCEDURE — 83880 ASSAY OF NATRIURETIC PEPTIDE: CPT | Performed by: EMERGENCY MEDICINE

## 2022-02-25 PROCEDURE — 99214 OFFICE O/P EST MOD 30 MIN: CPT | Mod: S$GLB,,, | Performed by: NURSE PRACTITIONER

## 2022-02-25 PROCEDURE — 93010 EKG 12-LEAD: ICD-10-PCS | Mod: 76,,, | Performed by: INTERNAL MEDICINE

## 2022-02-25 PROCEDURE — 85025 COMPLETE CBC W/AUTO DIFF WBC: CPT | Performed by: EMERGENCY MEDICINE

## 2022-02-25 PROCEDURE — 3066F NEPHROPATHY DOC TX: CPT | Mod: CPTII,S$GLB,, | Performed by: NURSE PRACTITIONER

## 2022-02-25 PROCEDURE — 1159F MED LIST DOCD IN RCRD: CPT | Mod: CPTII,S$GLB,, | Performed by: NURSE PRACTITIONER

## 2022-02-25 PROCEDURE — 1125F PR PAIN SEVERITY QUANTIFIED, PAIN PRESENT: ICD-10-PCS | Mod: CPTII,S$GLB,, | Performed by: NURSE PRACTITIONER

## 2022-02-25 PROCEDURE — 3078F PR MOST RECENT DIASTOLIC BLOOD PRESSURE < 80 MM HG: ICD-10-PCS | Mod: CPTII,S$GLB,, | Performed by: NURSE PRACTITIONER

## 2022-02-25 PROCEDURE — 82962 GLUCOSE BLOOD TEST: CPT

## 2022-02-25 PROCEDURE — U0002 COVID-19 LAB TEST NON-CDC: HCPCS | Performed by: EMERGENCY MEDICINE

## 2022-02-25 PROCEDURE — 83735 ASSAY OF MAGNESIUM: CPT | Performed by: EMERGENCY MEDICINE

## 2022-02-25 PROCEDURE — 3288F PR FALLS RISK ASSESSMENT DOCUMENTED: ICD-10-PCS | Mod: CPTII,S$GLB,, | Performed by: NURSE PRACTITIONER

## 2022-02-25 PROCEDURE — 3051F PR MOST RECENT HEMOGLOBIN A1C LEVEL 7.0 - < 8.0%: ICD-10-PCS | Mod: CPTII,S$GLB,, | Performed by: NURSE PRACTITIONER

## 2022-02-25 PROCEDURE — 1125F AMNT PAIN NOTED PAIN PRSNT: CPT | Mod: CPTII,S$GLB,, | Performed by: NURSE PRACTITIONER

## 2022-02-25 PROCEDURE — 3288F FALL RISK ASSESSMENT DOCD: CPT | Mod: CPTII,S$GLB,, | Performed by: NURSE PRACTITIONER

## 2022-02-25 PROCEDURE — 1101F PR PT FALLS ASSESS DOC 0-1 FALLS W/OUT INJ PAST YR: ICD-10-PCS | Mod: CPTII,S$GLB,, | Performed by: NURSE PRACTITIONER

## 2022-02-25 PROCEDURE — 99214 PR OFFICE/OUTPT VISIT, EST, LEVL IV, 30-39 MIN: ICD-10-PCS | Mod: S$GLB,,, | Performed by: NURSE PRACTITIONER

## 2022-02-25 PROCEDURE — 3072F LOW RISK FOR RETINOPATHY: CPT | Mod: CPTII,S$GLB,, | Performed by: NURSE PRACTITIONER

## 2022-02-25 PROCEDURE — 3072F PR LOW RISK FOR RETINOPATHY: ICD-10-PCS | Mod: CPTII,S$GLB,, | Performed by: NURSE PRACTITIONER

## 2022-02-25 PROCEDURE — 82553 CREATINE MB FRACTION: CPT | Performed by: EMERGENCY MEDICINE

## 2022-02-25 PROCEDURE — 3008F BODY MASS INDEX DOCD: CPT | Mod: CPTII,S$GLB,, | Performed by: NURSE PRACTITIONER

## 2022-02-25 PROCEDURE — 82803 BLOOD GASES ANY COMBINATION: CPT

## 2022-02-25 PROCEDURE — 36600 WITHDRAWAL OF ARTERIAL BLOOD: CPT

## 2022-02-25 PROCEDURE — 93010 EKG 12-LEAD: ICD-10-PCS | Mod: ,,, | Performed by: INTERNAL MEDICINE

## 2022-02-25 PROCEDURE — 1101F PT FALLS ASSESS-DOCD LE1/YR: CPT | Mod: CPTII,S$GLB,, | Performed by: NURSE PRACTITIONER

## 2022-02-25 PROCEDURE — 99999 PR PBB SHADOW E&M-EST. PATIENT-LVL V: CPT | Mod: PBBFAC,,, | Performed by: NURSE PRACTITIONER

## 2022-02-25 PROCEDURE — 82550 ASSAY OF CK (CPK): CPT | Performed by: EMERGENCY MEDICINE

## 2022-02-25 PROCEDURE — 93010 ELECTROCARDIOGRAM REPORT: CPT | Mod: 76,,, | Performed by: INTERNAL MEDICINE

## 2022-02-25 PROCEDURE — 99999 PR PBB SHADOW E&M-EST. PATIENT-LVL V: ICD-10-PCS | Mod: PBBFAC,,, | Performed by: NURSE PRACTITIONER

## 2022-02-25 PROCEDURE — 1160F RVW MEDS BY RX/DR IN RCRD: CPT | Mod: CPTII,S$GLB,, | Performed by: NURSE PRACTITIONER

## 2022-02-25 PROCEDURE — 4010F ACE/ARB THERAPY RXD/TAKEN: CPT | Mod: CPTII,S$GLB,, | Performed by: NURSE PRACTITIONER

## 2022-02-25 PROCEDURE — 4010F PR ACE/ARB THEARPY RXD/TAKEN: ICD-10-PCS | Mod: CPTII,S$GLB,, | Performed by: NURSE PRACTITIONER

## 2022-02-25 PROCEDURE — 80053 COMPREHEN METABOLIC PANEL: CPT | Performed by: EMERGENCY MEDICINE

## 2022-02-25 PROCEDURE — 1160F PR REVIEW ALL MEDS BY PRESCRIBER/CLIN PHARMACIST DOCUMENTED: ICD-10-PCS | Mod: CPTII,S$GLB,, | Performed by: NURSE PRACTITIONER

## 2022-02-25 PROCEDURE — 3066F PR DOCUMENTATION OF TREATMENT FOR NEPHROPATHY: ICD-10-PCS | Mod: CPTII,S$GLB,, | Performed by: NURSE PRACTITIONER

## 2022-02-25 PROCEDURE — 82140 ASSAY OF AMMONIA: CPT | Performed by: EMERGENCY MEDICINE

## 2022-02-25 PROCEDURE — 99900035 HC TECH TIME PER 15 MIN (STAT)

## 2022-02-25 RX ORDER — DEXTROSE MONOHYDRATE 25 G/50ML
25 INJECTION, SOLUTION INTRAVENOUS
Status: DISCONTINUED | OUTPATIENT
Start: 2022-02-25 | End: 2022-02-25

## 2022-02-25 RX ORDER — METOLAZONE 2.5 MG/1
2.5 TABLET ORAL EVERY OTHER DAY
Qty: 15 TABLET | Refills: 11
Start: 2022-02-25 | End: 2022-03-03

## 2022-02-25 RX ADMIN — DEXTROSE 250 ML: 10 SOLUTION INTRAVENOUS at 02:02

## 2022-02-25 RX ADMIN — SODIUM CHLORIDE 1000 ML: 0.9 INJECTION, SOLUTION INTRAVENOUS at 11:02

## 2022-02-25 NOTE — PHARMACY MED REC
"Admission Medication History     The home medication history was taken by Sukhwinder Driver.    You may go to "Admission" then "Reconcile Home Medications" tabs to review and/or act upon these items.      The home medication list has been updated by the Pharmacy department.    Please read ALL comments highlighted in yellow.    Please address this information as you see fit.     Feel free to contact us if you have any questions or require assistance.      The medications listed below were removed from the home medication list. Please reorder if appropriate:  Patient reports no longer taking the following medication(s):   FINASTERIDE 5 MG TABLET   POLYETHYLENE GLYCOL 17 GM/DOSE POWDER   POTASSIUM CHLORIDE SA 20 mEq TABLET    Medications listed below were obtained from: Patient/family, Analytic software- tab ticketbroker and Medical records  (Not in a hospital admission)      Potential issues to be addressed PRIOR TO DISCHARGE: NONE      [facility] [phone]  Please secure chat me! =)      Current Outpatient Medications on File Prior to Encounter   Medication Sig Dispense Refill Last Dose    apixaban (ELIQUIS) 5 mg Tab Take 1 tablet (5 mg total) by mouth 2 (two) times daily. 60 tablet 6 2/24/2022 at Unknown time    aspirin (ECOTRIN) 81 MG EC tablet Take 1 tablet by mouth Daily.    2/24/2022 at Unknown time    bisacodyl (DULCOLAX) 5 mg EC tablet Take 5 mg by mouth daily as needed for Constipation.   Past Month at Unknown time    calcitRIOL (ROCALTROL) 0.25 MCG Cap Take 1 capsule (0.25 mcg total) by mouth once daily. 30 capsule 11 2/24/2022 at Unknown time    ergocalciferol (VITAMIN D2) 50,000 unit Cap Take 1 capsule (50,000 Units total) by mouth every 14 (fourteen) days. 2 capsule 11 Past Month at Unknown time    famotidine (PEPCID) 20 MG tablet TAKE ONE TABLET BY MOUTH EVERY EVENING 30 tablet 11 2/24/2022 at Unknown time    fish oil-omega-3 fatty acids 300-1,000 mg capsule Take 1 g by mouth.   " 2/24/2022 at Unknown time    furosemide (LASIX) 80 MG tablet Take one-half tablet (40 mg) by mouth 2 (two) times daily. 30 tablet 11 2/24/2022 at Unknown time    glimepiride (AMARYL) 2 MG tablet Take 1 tablet (2 mg total) by mouth before breakfast. 90 tablet 3 2/24/2022 at Unknown time    insulin (LANTUS SOLOSTAR U-100 INSULIN) glargine 100 units/mL (3mL) SubQ pen Inject 35 Units into the skin 2 (two) times daily. 30 mL 11 2/24/2022 at Unknown time    insulin aspart U-100 (NOVOLOG FLEXPEN U-100 INSULIN) 100 unit/mL (3 mL) InPn pen Inject 25 Units into the skin 3 (three) times daily with meals. (Patient taking differently: Inject 20-30 Units into the skin 2 (two) times daily with meals.) 30 mL 11 2/24/2022 at Unknown time    ketoconazole (NIZORAL) 200 mg Tab Take 0.5 tablets (100 mg total) by mouth once daily. 15 tablet 9 2/24/2022 at Unknown time    magnesium oxide (MAG-OX) 400 mg (241.3 mg magnesium) tablet Take 1 tablet (400 mg total) by mouth once daily. 90 tablet 1 2/24/2022 at Unknown time    metoprolol succinate (TOPROL-XL) 25 MG 24 hr tablet Take 1 tablet (25 mg total) by mouth once daily. 30 tablet 11 2/24/2022 at Unknown time    multivitamin (THERAGRAN) tablet Take 1 tablet by mouth.   2/24/2022 at Unknown time    mycophenolate (CELLCEPT) 250 mg Cap Take 3 capsules (750 mg total) by mouth 2 (two) times daily. 180 capsule 11 2/24/2022 at Unknown time    predniSONE (DELTASONE) 5 MG tablet Take 1 tablet (5 mg total) by mouth once daily. 30 tablet 11 2/24/2022 at Unknown time    rosuvastatin (CRESTOR) 40 MG Tab Take 1 tablet (40 mg total) by mouth once daily in the evening. 90 tablet 3 2/24/2022 at Unknown time    sacubitriL-valsartan (ENTRESTO)  mg per tablet Take 1 tablet by mouth 2 (two) times daily. 60 tablet 3 2/24/2022 at Unknown time    tacrolimus (PROGRAF) 1 MG Cap Take 4 capsules (4 mg total) by mouth every morning AND 3 capsules (3 mg total) every evening. 210 capsule 11 2/24/2022  "at Unknown time    tamsulosin (FLOMAX) 0.4 mg Cap Take 1 capsule (0.4 mg total) by mouth once daily. 30 capsule 11 2/24/2022 at Unknown time    BD ULTRA-FINE MINI PEN NEEDLE 31 gauge x 3/16" Ndle Use to inject insulin as needed up to 4 times daily (Patient taking differently: Use to inject insulin as needed up to 4 times daily) 100 each 3     blood sugar diagnostic Strp Use to test four times per day (Patient taking differently: Use to test four times per day) 150 strip 11     lancets 33 gauge Misc 1 Stick by Misc.(Non-Drug; Combo Route) route as directed. Contour lancets. Use to check BG 2-3 times daily. 150 each 11     metOLazone (ZAROXOLYN) 2.5 MG tablet Take 1 tablet (2.5 mg total) by mouth every other day. 15 tablet 11     pen needle, diabetic (BD INSULIN PEN NEEDLE UF SHORT) 31 gauge x 5/16" Ndle USE TO INJECT INSULIN TWICE A  each 5     [DISCONTINUED] finasteride (PROSCAR) 5 mg tablet Take 1 tablet (5 mg total) by mouth once daily. 30 tablet 11     [DISCONTINUED] lancing device Misc 1 each by Misc.(Non-Drug; Combo Route) route 3 (three) times daily after meals. 1 each 0     [DISCONTINUED] metOLazone (ZAROXOLYN) 2.5 MG tablet Take 1 tablet (2.5 mg total) by mouth once daily. 30 tablet 11     [DISCONTINUED] polyethylene glycol (GLYCOLAX) 17 gram PwPk Take by mouth. Mix and take 1 capful (17 gm) by mouth once daily as needed for constipation.       [DISCONTINUED] potassium chloride SA (K-DUR,KLOR-CON) 20 MEQ tablet Take 1 tablet by mouth once daily for 5 days (Patient not taking: Reported on 2/25/2022) 5 tablet 0                              .          "

## 2022-02-25 NOTE — ED NOTES
Spoke w/ CT Technician regarding Mr. Cohen's ordered head CT.    Per the technician, Mr. Whittaker study will be completed shortly.

## 2022-02-25 NOTE — PATIENT INSTRUCTIONS
- DO NOT take Entresto, Lasix (furosemide), or metolazone today  - check your blood pressure every day a few hours after your morning pills. If it is consistently less than 90/50 - please call us

## 2022-02-25 NOTE — Clinical Note
February 25, 2022        Bucky Danielle  [address] [place] [address]  Phone: [phone]  Fax: [phone]             [facility]  [address] [place] [address]  Phone: [phone]  Fax: [phone]   Patient: Mitch Whittaker   MR Number: [id number]   YOB: 1955   Date of Visit: 2/25/2022       Dear Dr. Bucky Danielle    Thank you for referring Mitch Whittaker to me for evaluation. Attached you will find relevant portions of my assessment and plan of care.    If you have questions, please do not hesitate to call me. I look forward to following Mitch Whittaker along with you.    Sincerely,    Corina De La Torre, NP    Enclosure    If you would like to receive this communication electronically, please contact [email or web address] or [phone] to request [employer] Link access.    [employer] Link is a tool which provides read-only access to select patient information with whom you have a relationship. Its easy to use and provides real time access to review your patients record including encounter summaries, notes, results, and demographic information.    If you feel you have received this communication in error or would no longer like to receive these types of communications, please e-mail [email or web address]

## 2022-02-25 NOTE — ED PROVIDER NOTES
SCRIBE #1 NOTE: I, Kun Keita, am scribing for, and in the presence of, Ajit Lee MD. I have scribed the entire note.      History      Chief Complaint   Patient presents with    Hypotension     Sent by cardiology for low BP       Review of patient's allergies indicates:   Allergen Reactions    Lisinopril Other (See Comments)     Other reaction(s):  cough    Actos  [pioglitazone] Other (See Comments)     Other reaction(s): CHF    Metformin Other (See Comments)     Other reaction(s): renal insuff  Other reaction(s): CHF        HPI   HPI    2/25/2022, 10:39 AM   History obtained from the patient      History of Present Illness: Mitch Whittaker is a 66 y.o. male patient with a PMHx of CAD, CHF, CKD s/p kidney transplant on 6/12/2015 who presents to the Emergency Department for hypotension. Pt was referred to the ED by Corina De La Torre NP (Cardiology) after his BP in clinic was 70/40. Symptoms are constant and moderate in severity. No mitigating or exacerbating factors reported. Associated sxs include fatigue and generalized weakness. Patient denies any fever, chills, n/v/d, SOB, CP, numbness, dizziness, headache, and all other sxs at this time. No prior Tx reported. No further complaints or concerns at this time.     Arrival mode: Personal vehicle    PCP: Cornelio Ham MD       Past Medical History:  Past Medical History:   Diagnosis Date    Acquired renal cyst of left kidney     Anemia associated with chronic renal failure     CAD (coronary artery disease)     nonobstructive [facility] 9/14    CHF (congestive heart failure)     Chronic immunosuppression with Prograf and MMF 6/18/2015    Chronic venous insufficiency of lower extremity     CKD (chronic kidney disease) stage 3, GFR 30-59 ml/min     Diabetic retinopathy     DM (diabetes mellitus), type 2 with complications 1994    Edema     End stage kidney disease     s/p transplant, doing well    Gallbladder polyp     Heart failure, diastolic, due  to HTN     Hemodialysis status     off since transplant    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 RNA NEGATIVE 2015    History of colon polyps     HPTH (hyperparathyroidism)     Hyperlipidemia     Hypertension associated with stage 3 chronic kidney disease due to type 2 diabetes mellitus     LBBB (left bundle branch block) 2021    Morbid obesity with BMI of 45.0-49.9, adult     PCO (posterior capsular opacification), left 3/4/2019    Proteinuria     resolved s/p transplant    S/P kidney transplant     Sleep apnea     Type 2 diabetes, uncontrolled, with retinopathy     Type II diabetes mellitus with renal manifestations        Past Surgical History:  Past Surgical History:   Procedure Laterality Date    CARDIAC CATHETERIZATION      normal coronary    COLONOSCOPY N/A 2018    Procedure: COLONOSCOPY;  Surgeon: Chava Ronquillo MD;  Location: [facility];  Service: Endoscopy;  Laterality: N/A;    KIDNEY TRANSPLANT      RETINAL LASER PROCEDURE           Family History:  Family History   Problem Relation Age of Onset    Diabetes Mother     Hypertension Mother     Heart failure Mother     Kidney disease Sister         ESRD    Diabetes Sister     Diabetes Maternal Grandmother     Cancer Neg Hx        Social History:  Social History     Tobacco Use    Smoking status: Former Smoker     Quit date: 2013     Years since quittin.7    Smokeless tobacco: Former User     Quit date: 2013    Tobacco comment: used marijuana since [id number], stopped after started dialysis   Substance and Sexual Activity    Alcohol use: No     Alcohol/week: 0.0 standard drinks    Drug use: No     Comment:      Sexual activity: Never       ROS   Review of Systems   Constitutional: Positive for fatigue. Negative for chills and fever.   HENT: Negative for sore throat.    Respiratory: Negative for shortness of breath.    Cardiovascular: Negative for chest pain.  "  Gastrointestinal: Negative for diarrhea, nausea and vomiting.   Genitourinary: Negative for dysuria.   Musculoskeletal: Negative for back pain.   Skin: Negative for rash.   Neurological: Positive for weakness (generalized). Negative for dizziness, light-headedness, numbness and headaches.   Hematological: Does not bruise/bleed easily.   All other systems reviewed and are negative.    Physical Exam      Initial Vitals [02/25/22 1033]   BP Pulse Resp Temp SpO2   (!) 60/40 98 20 98 °F (36.7 °C) 98 %      MAP       --          Physical Exam  Nursing Notes and Vital Signs Reviewed.  Constitutional: Patient is in no acute distress. Obese.  Head: Atraumatic. Normocephalic.  Eyes: PERRL. EOM intact. Conjunctivae are not pale. No scleral icterus.  ENT: Mucous membranes are moist. Oropharynx is clear and symmetric.    Neck: Supple. Full ROM.  Cardiovascular: Regular rate. Regular rhythm. No murmurs, rubs, or gallops. Distal pulses are 2+ and symmetric.  Pulmonary/Chest: No respiratory distress. Clear to auscultation bilaterally. No wheezing or rales.  Abdominal: Soft and non-distended.  There is no tenderness.  No rebound, guarding, or rigidity.   Musculoskeletal: Moves all extremities. No obvious deformities. Shunt to R forearm, with good thrill.  Skin: Warm and dry.  Neurological:  Alert, awake, and appropriate.  Normal speech.  No acute focal neurological deficits are appreciated.  Psychiatric: Normal affect. Good eye contact. Appropriate in content.    ED Course    Procedures  ED Vital Signs:  Vitals:    02/25/22 1033 02/25/22 1039 02/25/22 1040 02/25/22 1042   BP: (!) 60/40   (!) 115/59   Pulse: 98  93 95   Resp: 20   (!) 22   Temp: 98 °F (36.7 °C)      TempSrc: Oral      SpO2: 98%   98%   Weight:  125.6 kg (276 lb 14.4 oz)     Height: 5' 7" (1.702 m)       02/25/22 1115 02/25/22 1123 02/25/22 1133 02/25/22 1235   BP: (!) 104/52 (!) 86/50 (!) 103/56    Pulse: 95 92 88 88   Resp:  (!) 24 15 20   Temp:    98.4 °F (36.9 " °C)   TempSrc:    Oral   SpO2: 98%  99% 99%   Weight:       Height:        02/25/22 1236 02/25/22 1245 02/25/22 1303 02/25/22 1410   BP: (!) 104/51 (!) 106/53 (!) 98/52 (!) 119/59   Pulse:  89 93 90   Resp:  (!) 31 (!) 27 (!) 24   Temp:       TempSrc:       SpO2:  98% 98%    Weight:       Height:           Abnormal Lab Results:  Labs Reviewed   CBC W/ AUTO DIFFERENTIAL - Abnormal; Notable for the following components:       Result Value    RBC 4.01 (*)     Hemoglobin 10.8 (*)     Hematocrit 34.8 (*)     MCH 26.9 (*)     MCHC 31.0 (*)     Immature Granulocytes 0.7 (*)     Gran # (ANC) 8.6 (*)     Immature Grans (Abs) 0.08 (*)     Gran % 79.4 (*)     Lymph % 11.1 (*)     All other components within normal limits   COMPREHENSIVE METABOLIC PANEL - Abnormal; Notable for the following components:    Potassium 3.2 (*)     CO2 32 (*)     Glucose 44 (*)     BUN 80 (*)     Creatinine 3.9 (*)     Albumin 3.4 (*)     ALT 9 (*)     eGFR if  17 (*)     eGFR if non  15 (*)     All other components within normal limits    Narrative:     Recollect per lab.   GLU  result(s) called and verbal readback obtained from YVAN WALL RN 0354. [facility]. by [facility] 02/25/2022 13:54   CK-MB - Abnormal; Notable for the following components:     (*)     All other components within normal limits    Narrative:     Recollect per lab.   TROPONIN I - Abnormal; Notable for the following components:    Troponin I 0.059 (*)     All other components within normal limits    Narrative:     Recollect per lab.   ISTAT PROCEDURE - Abnormal; Notable for the following components:    POC PH 7.457 (*)     POC PCO2 50.1 (*)     POC PO2 74 (*)     POC HCO3 35.3 (*)     All other components within normal limits   POCT GLUCOSE - Abnormal; Notable for the following components:    POCT Glucose 43 (*)     All other components within normal limits   POCT GLUCOSE - Abnormal; Notable for the following components:    POCT Glucose 131 (*)      All other components within normal limits   B-TYPE NATRIURETIC PEPTIDE   LACTIC ACID, PLASMA   AMMONIA   MAGNESIUM    Narrative:     Recollect per lab.   CK    Narrative:     Add on to specimen   URINALYSIS, REFLEX TO URINE CULTURE   SARS-COV-2 RDRP GENE    Narrative:     This test utilizes isothermal nucleic acid amplification   technology to detect the SARS-CoV-2 RdRp nucleic acid segment.   The analytical sensitivity (limit of detection) is 125 genome   equivalents/mL.   A POSITIVE result implies infection with the SARS-CoV-2 virus;   the patient is presumed to be contagious.     A NEGATIVE result means that SARS-CoV-2 nucleic acids are not   present above the limit of detection. A NEGATIVE result should be   treated as presumptive. It does not rule out the possibility of   COVID-19 and should not be the sole basis for treatment decisions.   If COVID-19 is strongly suspected based on clinical and exposure   history, re-testing using an alternate molecular assay should be   considered.   This test is only for use under the Food and Drug   Administration s Emergency Use Authorization (EUA).   Commercial kits are provided by [employer].   Performance characteristics of the EUA have been independently   verified by [facility] Department of   Pathology and Laboratory Medicine.   _________________________________________________________________   The authorized Fact Sheet for Healthcare Providers and the authorized Fact   Sheet for Patients of the ID NOW COVID-19 are available on the FDA   website:     https://www.fda.gov/media/867679/download  https://www.fda.gov/media/477094/download          POCT GLUCOSE        All Lab Results:  Results for orders placed or performed during the hospital encounter of 02/25/22   CBC Auto Differential   Result Value Ref Range    WBC 10.81 3.90 - 12.70 K/uL    RBC 4.01 (L) 4.60 - 6.20 M/uL    Hemoglobin 10.8 (L) 14.0 - 18.0 g/dL    Hematocrit 34.8 (L) 40.0 - 54.0 %     MCV 87 82 - 98 fL    MCH 26.9 (L) 27.0 - 31.0 pg    MCHC 31.0 (L) 32.0 - 36.0 g/dL    RDW 13.2 11.5 - 14.5 %    Platelets 190 150 - 450 K/uL    MPV 11.5 9.2 - 12.9 fL    Immature Granulocytes 0.7 (H) 0.0 - 0.5 %    Gran # (ANC) 8.6 (H) 1.8 - 7.7 K/uL    Immature Grans (Abs) 0.08 (H) 0.00 - 0.04 K/uL    Lymph # 1.2 1.0 - 4.8 K/uL    Mono # 0.9 0.3 - 1.0 K/uL    Eos # 0.0 0.0 - 0.5 K/uL    Baso # 0.03 0.00 - 0.20 K/uL    nRBC 0 0 /100 WBC    Gran % 79.4 (H) 38.0 - 73.0 %    Lymph % 11.1 (L) 18.0 - 48.0 %    Mono % 8.1 4.0 - 15.0 %    Eosinophil % 0.4 0.0 - 8.0 %    Basophil % 0.3 0.0 - 1.9 %    Platelet Estimate Appears normal     Aniso Slight     Poik Slight     Poly Occasional     Hypo Occasional     Ovalocytes Occasional     Stomatocytes Present     Acanthocytes Present     Differential Method Automated    BNP   Result Value Ref Range    BNP 29 0 - 99 pg/mL   Lactic acid, plasma   Result Value Ref Range    Lactate (Lactic Acid) 1.7 0.5 - 2.2 mmol/L   Ammonia   Result Value Ref Range    Ammonia 42 10 - 50 umol/L   Comprehensive metabolic panel   Result Value Ref Range    Sodium 144 136 - 145 mmol/L    Potassium 3.2 (L) 3.5 - 5.1 mmol/L    Chloride 99 95 - 110 mmol/L    CO2 32 (H) 23 - 29 mmol/L    Glucose 44 (LL) 70 - 110 mg/dL    BUN 80 (H) 8 - 23 mg/dL    Creatinine 3.9 (H) 0.5 - 1.4 mg/dL    Calcium 8.9 8.7 - 10.5 mg/dL    Total Protein 6.1 6.0 - 8.4 g/dL    Albumin 3.4 (L) 3.5 - 5.2 g/dL    Total Bilirubin 0.5 0.1 - 1.0 mg/dL    Alkaline Phosphatase 61 55 - 135 U/L    AST 12 10 - 40 U/L    ALT 9 (L) 10 - 44 U/L    Anion Gap 13 8 - 16 mmol/L    eGFR if African American 17 (A) >60 mL/min/1.73 m^2    eGFR if non African American 15 (A) >60 mL/min/1.73 m^2   Magnesium   Result Value Ref Range    Magnesium 2.0 1.6 - 2.6 mg/dL   CK-MB   Result Value Ref Range     (H) 20 - 200 U/L    CPK MB 1.5 0.1 - 6.5 ng/mL    MB % 0.7 0.0 - 5.0 %   Troponin I   Result Value Ref Range    Troponin I 0.059 (H) 0.000 - 0.026  ng/mL   CPK   Result Value Ref Range     20 - 200 U/L   POCT COVID-19 Rapid Screening   Result Value Ref Range    POC Rapid COVID Negative Negative     Acceptable Yes    POCT glucose   Result Value Ref Range    POCT Glucose 84 70 - 110 mg/dL   ISTAT PROCEDURE   Result Value Ref Range    POC PH 7.457 (H) 7.35 - 7.45    POC PCO2 50.1 (H) 35 - 45 mmHg    POC PO2 74 (L) 80 - 100 mmHg    POC HCO3 35.3 (H) 24 - 28 mmol/L    POC BE 11 -2 to 2 mmol/L    POC SATURATED O2 95 95 - 100 %    Sample ARTERIAL     Site LR     Allens Test Pass     DelSys Room Air     Mode SPONT    POCT glucose   Result Value Ref Range    POCT Glucose 43 (LL) 70 - 110 mg/dL   POCT glucose   Result Value Ref Range    POCT Glucose 131 (H) 70 - 110 mg/dL     *Note: Due to a large number of results and/or encounters for the requested time period, some results have not been displayed. A complete set of results can be found in Results Review.     Imaging Results:  Imaging Results          CT Head Without Contrast (Final result)  Result time 02/25/22 13:51:10    Final result by YULIA Hernandez Sr., MD (02/25/22 13:51:10)                 Impression:      1. There is no evidence of an acute ischemic event.  2. There is no intracranial hemorrhage.  3. There is moderate partial opacification of the right mastoid air cells.  All CT scans at this facility use dose modulation, iterative reconstruction, and/or weight base dosing when appropriate to reduce radiation dose when appropriate to reduce radiation dose to as low as reasonably achievable.      Electronically signed by: Geo Hernandez MD  Date:    02/25/2022  Time:    13:51             Narrative:    EXAMINATION:  CT HEAD WITHOUT CONTRAST    CLINICAL HISTORY:  Syncope, recurrent;    TECHNIQUE:  Standard brain CT protocol without IV contrast was performed.    COMPARISON:  None    FINDINGS:  There is no evidence of an acute ischemic event.  There is no intracranial hemorrhage.  The  ventricles have a normal size, position, and appearance. There is no skull fracture. The paranasal sinuses are normal in appearance.  There is moderate partial opacification of the right mastoid air cells.                               X-Ray Chest AP Portable (Final result)  Result time 02/25/22 11:44:33    Final result by Andrew Crespo MD (02/25/22 11:44:33)                 Impression:      No acute process seen.  Limited study.      Electronically signed by: Andrew Crespo MD  Date:    02/25/2022  Time:    11:44             Narrative:    EXAMINATION:  XR CHEST AP PORTABLE    CLINICAL HISTORY:  weakness;    FINDINGS:  Single view of the chest.  Comparison 01/05/2022.  Low lung volumes limits evaluation.    Cardiac silhouette is enlarged but accentuated AP film.  Aorta demonstrates atherosclerotic disease.  The lungs demonstrate no evidence of active disease.  No evidence of pleural effusion or pneumothorax.  Bones demonstrate scattered degenerative change.                               The EKG was ordered, reviewed, and independently interpreted by the ED provider.  Interpretation time: 10:36  Rate: 94 BPM  Rhythm: normal sinus rhythm  Interpretation: Left axis deviation. LBBB. No STEMI.           The Emergency Provider reviewed the vital signs and test results, which are outlined above.    ED Discussion     2:40 PM: Reassessed pt at this time. Discussed with pt all pertinent ED information and results. Discussed pt dx and plan of tx. Gave pt all f/u and return to the ED instructions. All questions and concerns were addressed at this time. Pt expresses understanding of information and instructions, and is comfortable with plan to discharge. Pt is stable for discharge.    I discussed with patient and/or family/caretaker that evaluation in the ED does not suggest any emergent or life threatening medical conditions requiring immediate intervention beyond what was provided in the ED, and I believe patient is safe for  discharge.  Regardless, an unremarkable evaluation in the ED does not preclude the development or presence of a serious of life threatening condition. As such, patient was instructed to return immediately for any worsening or change in current symptoms.         ED Medication(s):  Medications   sodium chloride 0.9% bolus 1,000 mL (0 mLs Intravenous Stopped 2/25/22 1225)   dextrose 10% bolus 250 mL (0 mLs Intravenous Stopped 2/25/22 1415)        Follow-up Information     Cornelio Ham MD.    Specialty: Internal Medicine  Contact information:  [address] [facility] [place]  [address]  [facility] [address]  [phone]                        New Prescriptions    No medications on file         Medical Decision Making    Medical Decision Making:   Clinical Tests:   Lab Tests: Ordered and Reviewed  Radiological Study: Ordered and Reviewed  Medical Tests: Ordered and Reviewed           Scribe Attestation:   Scribe #1: I performed the above scribed service and the documentation accurately describes the services I performed. I attest to the accuracy of the note.    Attending:   Physician Attestation Statement for Scribe #1: I, Ajit Lee MD, personally performed the services described in this documentation, as scribed by Kun Keita, in my presence, and it is both accurate and complete.          Clinical Impression       ICD-10-CM ICD-9-CM   1. Hypotension, unspecified hypotension type  I95.9 458.9   2. Weakness  R53.1 780.79   3. Hypoglycemia  E16.2 251.2       Disposition:   Disposition: Discharged  Condition: Stable         Ajit Lee MD  02/25/22 5337

## 2022-02-25 NOTE — PROGRESS NOTES
Subjective:   Patient ID:  Mitch Whittaker is a 66 y.o. male who presents for follow-up of Chronic combined systolic and diastolic congestive heart fa    Primary Cardiologist Dr. Muhammad    Cardiac Problems  1. Chronic combined systolic and diastolic heart failure//HFrEF; stage C, FC 3; EF 40% (2/2022)  2. CAD   3. Hypertension   4. LBBB  5. CKD 3  6. S/p living donor renal transplant (6/12/2015)  7. H/o DVT  8. DM 2  9. Anemia   10. Morbid obesity   11. Chronic venous insufficiency   12. MARION  13. Hypotension     HPI   Mr. Cohen presents today as a new pt to CHF clinic. Normally follows with Dr. Muhammad. Unfortunately today he is hypotensive 70/40. He is also tachycardic w/. He denied any symptoms during our visit, but upon further questioning states he has been very lethargic at home and dizzy. When he got up to check out he became very dizzy and somewhat confused and unable to answer my questions at which point I recommended he go to the ED. He is somewhat unclear on what meds he is taking, but hasn't taken any today. His daughter helps him with his medications at home. He does not wear his CPAP. I suspect over-diuresis vs polypharmacy. He also has not had labs since 2/3 at which point his K was 3.3.    Patient denies new or worsening chest pain/tightness, SOB, STEELE, PND, orthopnea, edema, palpitations, lightheadedness, dizziness, fainting, vision changes, weakness, or abnormal bleeding.     Patient states they are compliant with all medications and offers no side effects.     Review of Systems   Constitutional: Positive for malaise/fatigue.   HENT: Negative.    Eyes: Negative.  Negative for blurred vision, double vision, vision loss in left eye, vision loss in right eye, visual disturbance and visual halos.   Cardiovascular: Positive for dyspnea on exertion. Negative for chest pain, claudication, cyanosis, irregular heartbeat, leg swelling, near-syncope, orthopnea, palpitations and paroxysmal nocturnal  dyspnea.   Respiratory: Positive for shortness of breath. Negative for cough, hemoptysis, sleep disturbances due to breathing and wheezing.    Endocrine: Negative.    Hematologic/Lymphatic: Negative.  Negative for bleeding problem. Does not bruise/bleed easily.   Skin: Negative.  Negative for nail changes.   Musculoskeletal: Negative.  Negative for falls, muscle cramps, muscle weakness and myalgias.   Gastrointestinal: Negative.  Negative for bloating, abdominal pain, anorexia, hematemesis, hematochezia, melena, nausea and vomiting.   Genitourinary: Negative.  Negative for dysuria, flank pain and hematuria.   Neurological: Positive for dizziness, light-headedness and weakness. Negative for brief paralysis, focal weakness, headaches, numbness and paresthesias.   Psychiatric/Behavioral: Negative.    Allergic/Immunologic: Negative.        Objective:   Physical Exam  Vitals and nursing note reviewed.   Constitutional:       General: He is not in acute distress.     Appearance: Normal appearance. He is ill-appearing.   HENT:      Head: Normocephalic and atraumatic.   Eyes:      Extraocular Movements: Extraocular movements intact.      Pupils: Pupils are equal, round, and reactive to light.   Neck:      Vascular: No carotid bruit.   Cardiovascular:      Rate and Rhythm: Regular rhythm. Tachycardia present.      Pulses: Normal pulses.      Heart sounds: Normal heart sounds. No murmur heard.  Pulmonary:      Effort: Pulmonary effort is normal.      Breath sounds: Decreased breath sounds present.   Abdominal:      General: Bowel sounds are normal. There is no distension.      Palpations: Abdomen is soft.      Tenderness: There is no abdominal tenderness.   Musculoskeletal:         General: No swelling or tenderness. Normal range of motion.      Cervical back: Normal range of motion and neck supple. No tenderness.      Right lower leg: No edema.      Left lower leg: No edema.   Lymphadenopathy:      Cervical: No cervical  "adenopathy.   Skin:     General: Skin is warm and dry.      Capillary Refill: Capillary refill takes less than 2 seconds.   Neurological:      General: No focal deficit present.      Mental Status: He is alert and oriented to person, place, and time. Mental status is at baseline.   Psychiatric:         Mood and Affect: Mood normal.         Behavior: Behavior normal.         Thought Content: Thought content normal.         Judgment: Judgment normal.         BP (!) 70/40 (BP Location: Left arm, Patient Position: Sitting)   Pulse 103   Ht 5' 7" (1.702 m)   Wt 125.5 kg (276 lb 10.8 oz)   SpO2 97%   BMI 43.33 kg/m²     Assessment:     1. Chronic combined systolic and diastolic congestive heart failure    2. Coronary artery disease, unspecified vessel or lesion type, unspecified whether angina present, unspecified whether native or transplanted heart    3. Hypertension, unspecified type    4. LBBB (left bundle branch block)    5. Stage 3 chronic kidney disease, unspecified whether stage 3a or 3b CKD    6. Living-related donor kidney transplant (daughter) - 6/12/15    7. History of DVT (deep vein thrombosis)    8. DM (diabetes mellitus), type 2 with complications    9. Anemia associated with chronic renal failure    10. Morbid obesity    11. Chronic venous insufficiency of lower extremity    12. MARION (obstructive sleep apnea)    13. Hypotension, unspecified hypotension type      Labs/Imaging Data Reviewed:  Component      Latest Ref Rng & Units 2/2/2022   Glucose      70 - 110 mg/dL 64 (L)   Sodium      136 - 145 mmol/L 140   Potassium      3.5 - 5.1 mmol/L 3.3 (L)   Chloride      95 - 110 mmol/L 95   CO2      23 - 29 mmol/L 29   BUN      8 - 23 mg/dL 55 (H)   Calcium      8.7 - 10.5 mg/dL 9.5   Creatinine      0.5 - 1.4 mg/dL 2.6 (H)   Albumin      3.5 - 5.2 g/dL 3.2 (L)   Phosphorus      2.7 - 4.5 mg/dL 3.3   eGFR if African American      >60 mL/min/1.73 m:2 28.0 (A)   eGFR if non       >60 " mL/min/1.73 m:2 24.3 (A)   Anion Gap      8 - 16 mmol/L 16     1. TTE 2/18/2022  Summary  · Concentric hypertrophy and mildly decreased systolic function.  · Mild tricuspid regurgitation.  · Mild left atrial enlargement.  · The estimated ejection fraction is 40%.  · Left ventricular diastolic dysfunction.  · There is abnormal septal wall motion consistent with left bundle branch block.  · With normal right ventricular systolic function.  · Normal central venous pressure (3 mmHg).  · The estimated PA systolic pressure is 27 mmHg.    Plan:   1. Chronic combined systolic and diastolic congestive heart failure  - pt does not appear volume overloaded today on exam  - suspect overdiuresis with 80mg PO lasix daily and 2.5mg PO metolazone every other day as pt is very hypotensive today on exam  - continue GDMT once patient hemodynamically stable    2. Coronary artery disease, unspecified vessel or lesion type, unspecified whether angina present, unspecified whether native or transplanted heart  - stable  - denies CP  - continue aspirin 81mg daily   - continue Crestor 40mg daily     3. Hypertension, unspecified type  - pt hypotensive today  - recently stopped hydralazine     4. LBBB (left bundle branch block)  - stable on EKG done today    5. Stage 3 chronic kidney disease, unspecified whether stage 3a or 3b CKD  - last labs done 2/3/22  - needs stat BMP    6. Living-related donor kidney transplant (daughter) - 6/12/15  - Prograf    7. History of DVT (deep vein thrombosis)  - chronically anticoagulated on Eliquis 5mg BID    8. DM (diabetes mellitus), type 2 with complications  - POCT  today  - last A1C  - Lan[place]os fultonlog    9. Anemia associated with chronic renal failure  - stable    10. Morbid obesity  - discussed importance of weight loss as it relates to CHF management    11. Chronic venous insufficiency of lower extremity  - stable    12. MARION (obstructive sleep apnea)  - pt not compliant with cpap    13.  Hypotension, unspecified hypotension type  - recommending ED as pt is symptomatic   - ?polypharmacy vs. Over-diuresis     14. Hypokalemia  - has not had labs since 2/3 at which point K was 3.3  - stat BMP          Corina De La Torre, MAGALIE  [facility] [place]    This note was created using [employer] voice recognition software that occasionally misinterpreted phrases or words. Please excuse any grammatical errors.

## 2022-02-28 ENCOUNTER — TELEPHONE (OUTPATIENT)
Dept: TRANSPLANT | Facility: CLINIC | Age: 67
End: 2022-02-28
Payer: COMMERCIAL

## 2022-02-28 NOTE — TELEPHONE ENCOUNTER
He was in the ED and no cardiology follow up was arranged. He was over diuresed and needs CHF f/u.  Please schedule with me at some point this week with BMP

## 2022-03-02 NOTE — DISCHARGE INSTRUCTIONS

## 2022-03-03 ENCOUNTER — OFFICE VISIT (OUTPATIENT)
Dept: TRANSPLANT | Facility: CLINIC | Age: 67
End: 2022-03-03
Payer: COMMERCIAL

## 2022-03-03 ENCOUNTER — TELEPHONE (OUTPATIENT)
Dept: TRANSPLANT | Facility: CLINIC | Age: 67
End: 2022-03-03
Payer: COMMERCIAL

## 2022-03-03 ENCOUNTER — LAB VISIT (OUTPATIENT)
Dept: LAB | Facility: HOSPITAL | Age: 67
End: 2022-03-03
Attending: INTERNAL MEDICINE
Payer: COMMERCIAL

## 2022-03-03 VITALS
OXYGEN SATURATION: 97 % | WEIGHT: 277.56 LBS | HEIGHT: 67 IN | BODY MASS INDEX: 43.56 KG/M2 | SYSTOLIC BLOOD PRESSURE: 90 MMHG | HEART RATE: 102 BPM | DIASTOLIC BLOOD PRESSURE: 58 MMHG

## 2022-03-03 DIAGNOSIS — I50.42 CHRONIC COMBINED SYSTOLIC AND DIASTOLIC CONGESTIVE HEART FAILURE: Primary | ICD-10-CM

## 2022-03-03 DIAGNOSIS — I25.10 CORONARY ARTERY DISEASE, UNSPECIFIED VESSEL OR LESION TYPE, UNSPECIFIED WHETHER ANGINA PRESENT, UNSPECIFIED WHETHER NATIVE OR TRANSPLANTED HEART: ICD-10-CM

## 2022-03-03 DIAGNOSIS — N18.9 ANEMIA ASSOCIATED WITH CHRONIC RENAL FAILURE: ICD-10-CM

## 2022-03-03 DIAGNOSIS — E66.01 MORBID OBESITY: ICD-10-CM

## 2022-03-03 DIAGNOSIS — I44.7 LBBB (LEFT BUNDLE BRANCH BLOCK): ICD-10-CM

## 2022-03-03 DIAGNOSIS — E87.6 HYPOKALEMIA: ICD-10-CM

## 2022-03-03 DIAGNOSIS — I95.9 HYPOTENSION, UNSPECIFIED HYPOTENSION TYPE: ICD-10-CM

## 2022-03-03 DIAGNOSIS — N18.30 STAGE 3 CHRONIC KIDNEY DISEASE, UNSPECIFIED WHETHER STAGE 3A OR 3B CKD: ICD-10-CM

## 2022-03-03 DIAGNOSIS — E11.8 DM (DIABETES MELLITUS), TYPE 2 WITH COMPLICATIONS: ICD-10-CM

## 2022-03-03 DIAGNOSIS — Z94.0 KIDNEY TRANSPLANT STATUS, LIVING RELATED DONOR: ICD-10-CM

## 2022-03-03 DIAGNOSIS — D63.1 ANEMIA ASSOCIATED WITH CHRONIC RENAL FAILURE: ICD-10-CM

## 2022-03-03 DIAGNOSIS — I87.2 CHRONIC VENOUS INSUFFICIENCY OF LOWER EXTREMITY: ICD-10-CM

## 2022-03-03 DIAGNOSIS — I10 HYPERTENSION, UNSPECIFIED TYPE: ICD-10-CM

## 2022-03-03 DIAGNOSIS — Z94.0 KIDNEY REPLACED BY TRANSPLANT: ICD-10-CM

## 2022-03-03 DIAGNOSIS — G47.33 OSA (OBSTRUCTIVE SLEEP APNEA): ICD-10-CM

## 2022-03-03 DIAGNOSIS — Z86.718 HISTORY OF DVT (DEEP VEIN THROMBOSIS): ICD-10-CM

## 2022-03-03 LAB
ALBUMIN SERPL BCP-MCNC: 3.3 G/DL (ref 3.5–5.2)
ANION GAP SERPL CALC-SCNC: 13 MMOL/L (ref 8–16)
BUN SERPL-MCNC: 64 MG/DL (ref 8–23)
CALCIUM SERPL-MCNC: 9.3 MG/DL (ref 8.7–10.5)
CHLORIDE SERPL-SCNC: 96 MMOL/L (ref 95–110)
CO2 SERPL-SCNC: 32 MMOL/L (ref 23–29)
CREAT SERPL-MCNC: 2.8 MG/DL (ref 0.5–1.4)
EST. GFR  (AFRICAN AMERICAN): 25.6 ML/MIN/1.73 M^2
EST. GFR  (NON AFRICAN AMERICAN): 22.2 ML/MIN/1.73 M^2
GLUCOSE SERPL-MCNC: 84 MG/DL (ref 70–110)
MAGNESIUM SERPL-MCNC: 2.1 MG/DL (ref 1.6–2.6)
PHOSPHATE SERPL-MCNC: 2.9 MG/DL (ref 2.7–4.5)
POTASSIUM SERPL-SCNC: 2.9 MMOL/L (ref 3.5–5.1)
SODIUM SERPL-SCNC: 141 MMOL/L (ref 136–145)

## 2022-03-03 PROCEDURE — 1160F PR REVIEW ALL MEDS BY PRESCRIBER/CLIN PHARMACIST DOCUMENTED: ICD-10-PCS | Mod: CPTII,S$GLB,, | Performed by: NURSE PRACTITIONER

## 2022-03-03 PROCEDURE — 1101F PR PT FALLS ASSESS DOC 0-1 FALLS W/OUT INJ PAST YR: ICD-10-PCS | Mod: CPTII,S$GLB,, | Performed by: NURSE PRACTITIONER

## 2022-03-03 PROCEDURE — 3051F HG A1C>EQUAL 7.0%<8.0%: CPT | Mod: CPTII,S$GLB,, | Performed by: NURSE PRACTITIONER

## 2022-03-03 PROCEDURE — 1159F PR MEDICATION LIST DOCUMENTED IN MEDICAL RECORD: ICD-10-PCS | Mod: CPTII,S$GLB,, | Performed by: NURSE PRACTITIONER

## 2022-03-03 PROCEDURE — 3072F PR LOW RISK FOR RETINOPATHY: ICD-10-PCS | Mod: CPTII,S$GLB,, | Performed by: NURSE PRACTITIONER

## 2022-03-03 PROCEDURE — 99215 PR OFFICE/OUTPT VISIT, EST, LEVL V, 40-54 MIN: ICD-10-PCS | Mod: S$GLB,,, | Performed by: NURSE PRACTITIONER

## 2022-03-03 PROCEDURE — 3051F PR MOST RECENT HEMOGLOBIN A1C LEVEL 7.0 - < 8.0%: ICD-10-PCS | Mod: CPTII,S$GLB,, | Performed by: NURSE PRACTITIONER

## 2022-03-03 PROCEDURE — 1101F PT FALLS ASSESS-DOCD LE1/YR: CPT | Mod: CPTII,S$GLB,, | Performed by: NURSE PRACTITIONER

## 2022-03-03 PROCEDURE — 4010F ACE/ARB THERAPY RXD/TAKEN: CPT | Mod: CPTII,S$GLB,, | Performed by: NURSE PRACTITIONER

## 2022-03-03 PROCEDURE — 99999 PR PBB SHADOW E&M-EST. PATIENT-LVL V: ICD-10-PCS | Mod: PBBFAC,,, | Performed by: NURSE PRACTITIONER

## 2022-03-03 PROCEDURE — 1159F MED LIST DOCD IN RCRD: CPT | Mod: CPTII,S$GLB,, | Performed by: NURSE PRACTITIONER

## 2022-03-03 PROCEDURE — 3078F PR MOST RECENT DIASTOLIC BLOOD PRESSURE < 80 MM HG: ICD-10-PCS | Mod: CPTII,S$GLB,, | Performed by: NURSE PRACTITIONER

## 2022-03-03 PROCEDURE — 99215 OFFICE O/P EST HI 40 MIN: CPT | Mod: S$GLB,,, | Performed by: NURSE PRACTITIONER

## 2022-03-03 PROCEDURE — 3072F LOW RISK FOR RETINOPATHY: CPT | Mod: CPTII,S$GLB,, | Performed by: NURSE PRACTITIONER

## 2022-03-03 PROCEDURE — 83735 ASSAY OF MAGNESIUM: CPT | Performed by: INTERNAL MEDICINE

## 2022-03-03 PROCEDURE — 1160F RVW MEDS BY RX/DR IN RCRD: CPT | Mod: CPTII,S$GLB,, | Performed by: NURSE PRACTITIONER

## 2022-03-03 PROCEDURE — 3008F BODY MASS INDEX DOCD: CPT | Mod: CPTII,S$GLB,, | Performed by: NURSE PRACTITIONER

## 2022-03-03 PROCEDURE — 1126F AMNT PAIN NOTED NONE PRSNT: CPT | Mod: CPTII,S$GLB,, | Performed by: NURSE PRACTITIONER

## 2022-03-03 PROCEDURE — 4010F PR ACE/ARB THEARPY RXD/TAKEN: ICD-10-PCS | Mod: CPTII,S$GLB,, | Performed by: NURSE PRACTITIONER

## 2022-03-03 PROCEDURE — 3078F DIAST BP <80 MM HG: CPT | Mod: CPTII,S$GLB,, | Performed by: NURSE PRACTITIONER

## 2022-03-03 PROCEDURE — 3074F SYST BP LT 130 MM HG: CPT | Mod: CPTII,S$GLB,, | Performed by: NURSE PRACTITIONER

## 2022-03-03 PROCEDURE — 80069 RENAL FUNCTION PANEL: CPT | Performed by: INTERNAL MEDICINE

## 2022-03-03 PROCEDURE — 3008F PR BODY MASS INDEX (BMI) DOCUMENTED: ICD-10-PCS | Mod: CPTII,S$GLB,, | Performed by: NURSE PRACTITIONER

## 2022-03-03 PROCEDURE — 99999 PR PBB SHADOW E&M-EST. PATIENT-LVL V: CPT | Mod: PBBFAC,,, | Performed by: NURSE PRACTITIONER

## 2022-03-03 PROCEDURE — 3288F PR FALLS RISK ASSESSMENT DOCUMENTED: ICD-10-PCS | Mod: CPTII,S$GLB,, | Performed by: NURSE PRACTITIONER

## 2022-03-03 PROCEDURE — 80197 ASSAY OF TACROLIMUS: CPT | Performed by: INTERNAL MEDICINE

## 2022-03-03 PROCEDURE — 3066F PR DOCUMENTATION OF TREATMENT FOR NEPHROPATHY: ICD-10-PCS | Mod: CPTII,S$GLB,, | Performed by: NURSE PRACTITIONER

## 2022-03-03 PROCEDURE — 3074F PR MOST RECENT SYSTOLIC BLOOD PRESSURE < 130 MM HG: ICD-10-PCS | Mod: CPTII,S$GLB,, | Performed by: NURSE PRACTITIONER

## 2022-03-03 PROCEDURE — 1126F PR PAIN SEVERITY QUANTIFIED, NO PAIN PRESENT: ICD-10-PCS | Mod: CPTII,S$GLB,, | Performed by: NURSE PRACTITIONER

## 2022-03-03 PROCEDURE — 3288F FALL RISK ASSESSMENT DOCD: CPT | Mod: CPTII,S$GLB,, | Performed by: NURSE PRACTITIONER

## 2022-03-03 PROCEDURE — 36415 COLL VENOUS BLD VENIPUNCTURE: CPT | Performed by: INTERNAL MEDICINE

## 2022-03-03 PROCEDURE — 3066F NEPHROPATHY DOC TX: CPT | Mod: CPTII,S$GLB,, | Performed by: NURSE PRACTITIONER

## 2022-03-03 RX ORDER — POTASSIUM CHLORIDE 20 MEQ/1
40 TABLET, EXTENDED RELEASE ORAL DAILY
Qty: 60 TABLET | Refills: 3 | Status: SHIPPED | OUTPATIENT
Start: 2022-03-03 | End: 2022-07-01 | Stop reason: SDUPTHER

## 2022-03-03 RX ORDER — POTASSIUM CHLORIDE 20 MEQ/1
40 TABLET, EXTENDED RELEASE ORAL DAILY
Qty: 60 TABLET | Refills: 3 | Status: SHIPPED | OUTPATIENT
Start: 2022-03-03 | End: 2022-03-03 | Stop reason: SDUPTHER

## 2022-03-03 NOTE — TELEPHONE ENCOUNTER
Please start potassium 40meq daily  Repeat BMP 1 week  We stopped his metolazone today d/t overdiuresis. He may need torsemide in the future, but will assess response the the currently ordered lasix 40mg BID

## 2022-03-03 NOTE — PROGRESS NOTES
Low K: he is on lasix 40 mg BID (who started this). Prescribing Dr needs to follow K. Please Kcl 40 meq daily X 4 days. Follow with his doctor. Check K in a week.   Cr at baseline.

## 2022-03-03 NOTE — LETTER
March 3, 2022        Bucky Danielle  [address] [place] [address]  Phone: [phone]  Fax: [phone]             [facility]  [address] [place] [address]  Phone: [phone]  Fax: [phone]   Patient: Mitch Whittaker   MR Number: [id number]   YOB: 1955   Date of Visit: 3/3/2022       Dear Dr. Bucky Danielle    Thank you for referring Mitch Whittaker to me for evaluation. Attached you will find relevant portions of my assessment and plan of care.    If you have questions, please do not hesitate to call me. I look forward to following Mitch Whittaker along with you.    Sincerely,    Corina De La Torre, NP    Enclosure    If you would like to receive this communication electronically, please contact [email or web address] or [phone] to request [employer] access.    [employer] is a tool which provides read-only access to select patient information with whom you have a relationship. Its easy to use and provides real time access to review your patients record including encounter summaries, notes, results, and demographic information.    If you feel you have received this communication in error or would no longer like to receive these types of communications, please e-mail [email or web address]

## 2022-03-03 NOTE — TELEPHONE ENCOUNTER
Patient repeated back and voice a understanding of orders.  Patient seen today by cardiology and K addressed by cardiology.  Patient will  his RX today.  High K diet reviewed.  Patient will repeat labs on 3/11for cardiology.  ----- Message from Marci Pan MD sent at 3/3/2022  3:55 PM CST -----  Low K: he is on lasix 40 mg BID (who started this). Prescribing Dr needs to follow K. Please Kcl 40 meq daily X 4 days. Follow with his doctor. Check K in a week.   Cr at baseline.

## 2022-03-03 NOTE — PATIENT INSTRUCTIONS
- STOP Metolazone   - no other changes right now     Subjective     Aliya Ballesteros is a   2120 g male infant admitted 2019  5:20 PM at Gestational Age: 35w6d now at age: 3 Wks and whose YOB: 2021.     Corrected Gestational Age: 39w1d    Objective      VITAL SIGNS    Vital Last Value 24 Hour Range   Temperature 98.8 °F (37.1 °C) (20 0430) Temp  Min: 98.7 °F (37.1 °C)  Max: 100.5 °F (38.1 °C)      Pulse 160 (20 0500) Pulse  Min: 130  Max: 198   Respiratory 52 (20 0500) Resp  Min: 39  Max: 109   Non-Invasive Blood Pressure 78/36 (20 2339) BP  Min: 78/36  Max: 78/36   Arterial Blood Pressure   No data recorded   Mean Arterial Pressure   No data recorded   Pulse Oximetry Sat 100 % (20 0500) SpO2  Min: 90 %  Max: 100 %       Vital Today 24 Hour Range   Weight 2600 g (20 2300) Weight  Min: 2600 g  Max: 2600 g   Length  17.5\" (44.5 cm)(Filed from Delivery Summary) (19 1720) No data recorded   Head Circumference 32 cm (12.6\")(Filed from Delivery Summary) (19 172) No data recorded       LINES, TUBES, AND DRAINS      N/A      LABORATORY STUDIES    No results found for this or any previous visit (from the past 24 hour(s)).    IMAGING STUDIES  No results found.      MEDICATIONS  Scheduled Meds:  • morphine  0.02 mg/kg (Dosing Weight) Oral Q3H   • nystatin  100,000 Units Oral Q6H       Continuous Infusions: none    PRN Meds:  simethicone       Physical Exam  Vitals signs and nursing note reviewed.   Constitutional:       General: He is active.      Appearance: Normal appearance.   HENT:      Head: Normocephalic and atraumatic. Anterior fontanelle is flat.      Nose: Nose normal.      Mouth/Throat:      Mouth: Mucous membranes are moist.      Pharynx: Oropharynx is clear.   Cardiovascular:      Rate and Rhythm: Normal rate and regular rhythm.      Pulses: Normal pulses.      Heart sounds: No murmur.   Pulmonary:      Effort: Pulmonary effort is normal. No respiratory distress.      Breath sounds:  Normal breath sounds.   Abdominal:      General: Abdomen is flat. Bowel sounds are normal.      Palpations: Abdomen is soft.   Skin:     General: Skin is warm and dry.      Capillary Refill: Capillary refill takes less than 2 seconds.      Turgor: Normal.   Neurological:      General: No focal deficit present.      Mental Status: He is alert.       ASSESSMENT AND PLAN    1. FLUIDS AND NUTRITION    Feedings are Similac Advance 24 kcal per ounce  ad red demand.  Babe is nippling 196 ml/kg/day.  Urine output was appropriate.  Babe is not stooling.  Last stool: 1 (20 0430)    Continue current feedings as tolerated.      2.  RESPIRATORY    Respiratory Settings Last Value   Nasal Cannula Flow      Mode     Rate     Peak Inspiratory Pressure     Tidal Volume     Inspiratory time     PEEP/CPAC/EPAP     FiO2       Last Apnea:     Intervention: Self limiting (12/15/19 0600)    Last Bradycardia: length each event lasted (in sec) over the past 24 hours: Bradycardia (secs): 5 secs (12/15/19 0600);  Interventions: Intervention: Self limiting (12/15/19 0600)      Plan: Continue to monitor    3.  CARDIAC    Results for orders placed during the hospital encounter of 19   Echo M-Mode/2D/Doppler (Routine)    Narrative    Pediatric Echocardiogram Report  HIS Form Number: [id number]  Name:           YAIMA JASSO           :    2021  Ht: 45.0 cm  EPIC ID#:       [id number]                   Age:    6 days      Wt: 2.1 kg  Study Date:     2019 8:22:51 AM        Gender: M          BSA: 0.16 mÂ²  Study Type:     Ped TTE                                          BP: /  Study Location: [facility] [place]  Accession #:    [id number]     Requesting Provider: Gricelda Hopper MD  Reading Physician:   [id number] Dolores Kwon MD  Sonographer:         DOROTEO     Indications:       Murmur  Study Information: The images were of adequate diagnostic quality.  Results reported to: NICU team - recommend repeat echo in  1 week or sooner if                       there are any clinical concerns     Summary:   1. Several small muscular ventricular septal defects with left to right shunting, largest at the apex. Overall moderate shunt.   2. Moderate-sized secundum atrial septal defect with left to right shunting.   3. Possible small PDA with bidirectional shunting (best seen in image 20).   4. The aortic annulus measures at the lower limits of normal. The aortic valve morphology is not well visualized. No aortic stenosis or regurgitation.   5. The aortic isthmus measures low normal and there is mild flow acceleration in the descending aorta to 2 m/s. Coarctation cannot be entirely excluded in the setting of a PDA.   6. Moderate right ventricular dilation with moderate right ventricular hypertrophy.   7. Elevated right ventricular pressure is suspected based on right ventricular dilation, septal flattening and pulmonary artery Doppler pattern.   8. The tricuspid regurgitation jet is inadequate to quantitatively assess right ventricular systolic pressure.   9. Qualitatively normal left ventricular size. Normal left ventricular systolic function.  Segmental Cardiotype, Cardiac Position, and Situs:  (S,D,S). The heart position is within the left hemithorax. The cardiac apex is oriented leftward.     Systemic Veins:  The superior vena cava is right-sided and drains normally to the right atrium. The inferior vena cava inserts normally into the right atrium.     Pulmonary Veins:  Four pulmonary veins drain to the left atrium.     Atria:  Moderate-sized secundum atrial septal defect with left to right shunting across the interatrial septum. The right atrium is mildly dilated. The left atrium is normal in size.     Mitral Valve:  The mitral valve leaflets are structurally normal. There is no evidence of mitral valve stenosis. There is no mitral regurgitation.     Tricuspid Valve:  The tricuspid valve leaflets are structurally normal. There is  no evidence of tricuspid valve stenosis. There is trivial (physiologic) tricuspid regurgitation. The tricuspid regurgitation jet is inadequate to quantitatively assess right ventricular   systolic pressure.     Left Ventricle:  Qualitatively normal left ventricular size. The left ventricle is apex-forming. Normal left ventricular systolic function.     Left Ventricular Outflow Tract:  There is no left ventricular outflow tract obstruction.     Right Ventricle:  Moderate right ventricular dilation with moderate right ventricular hypertrophy. Right ventricular systolic function is qualitatively normal. Unable to quantitatively assess right ventricular pressure, however elevated right ventricular pressure is   suspected based on right ventricular dilation, systolic flattening of the interventricular septum and pulmonary artery Doppler pattern.     Right Ventricular Outflow Tract:  No right ventricular outflow tract obstruction.     VSD:  Several small muscular ventricular septal defects with left to right shunting, largest at the apex. Overall moderate shunt.     Aortic Valve:  The aortic valve morphology is not well visualized. The aortic root is normal in size. The aortic valve flow patterns are normal. There is no aortic valve regurgitation. The aortic annulus measures at the lower limits of normal. The aortic valve   morphology is not well visualized. No aortic stenosis or regurgitation.     Pulmonary Valve:  The pulmonary valve is normal. There is no pulmonary valve stenosis. There is trivial pulmonary regurgitation.     Pulmonary Arteries:  The main pulmonary artery is normal in size. The branch pulmonary arteries are normal in size.     Aorta:  There is normal flow in the descending aorta. There is a left aortic arch with normal branching pattern. The aortic isthmus measures low normal and there is mild flow acceleration in the descending aorta to 2 m/s.     Ductus Arteriosus:  Possible small PDA with  bidirectional shunting (best seen in image 20).     Coronary Arteries:  Normal origins of the right and left coronary arteries with antegrade flow demonstrated by color Doppler.     Pericardium:  There is no pericardial effusion.     Measurements     M-Mode  Aortic Root:      0.60 cm  RV d:             1.28 cm  LA diam s, Mmode: 1.51 cm  -----------------------------------------------------------------------------------------     2D                                  Z-Score  Aorta Annulus diam:      0.48 cm    -1.89  Aortic Root s diam:      0.6 cm     -1.9  Aorta ST Junction diam:  0.57 cm    -1.35  Ascending Aorta diam s:  0.8 cm     0.4  Aorta Isthmus diam:      0.30 cm    -1.75  LVIDd:                   1.40 cm    -2.30   *  LVIDs:                   0.95 cm    -0.60  IVSd:                    0.32 cm    -0.79  LVPWd:                   0.26 cm    -2.03   *  LV Mass ASE:             5 g  LVMI (ht^2.7, ASE emery): 41 g/m^2.7  -----------------------------------------------------------------------------------------     LV Systolic Function  LV FS (2D):          32 %  -----------------------------------------------------------------------------------------     LV Diastolic Function  MV S' lateral:        0.053 m/s  MV E' lateral:        0.042 m/s  MV A' lateral:        0.087 m/s  Lateral E/e':         24.0  -----------------------------------------------------------------------------------------     Aortic Valve Doppler  LVOT VMax:           0.80 m/s  LVOT peak grad:      3 mmHg  AoV VMax:            0.80 m/s  AoV peak grad:       2.6 mmHg  AoV Area (Leia):      0.18 cmÂ²  -----------------------------------------------------------------------------------------     Mitral Valve Doppler  MV E VMax:           1.01 m/s  MV A VMax:           1.03 m/s  MV E/A Ratio:        0.98  -----------------------------------------------------------------------------------------     Pulmonary Valve Doppler  RVOT VMax:              0.94  m/s  RVOT peak grad:         4 mmHg  PulmV VMax:             1.20 m/s  PulmV peak grad:        5.8 mmHg  RPA VMax:               1.35 m/s  RPA peak grad:          7.3 mmHg  LPA VMax:               1.26 m/s  LPA peak grad:          6.4 mmHg  Pulmonary Valve VMax:          1.2 m/s  Pulmonary Valve peak gradient: 6 mmHg  -----------------------------------------------------------------------------------------     Aorta Doppler  Descending Aorta VMax:      2.00 m/s  Descending Aorta peak grad: 16 mmHg  -----------------------------------------------------------------------------------------     [id number] Dolores Kwon MD  *Electronically signed on 2019 at 12:25:38 PM     Billing/Additional Procedures  A complete echocardiogram was performed.            4.  INFECTIOUS DISEASE    No results found for this or any previous visit.    Antibiotics received:  None.    5.  NEUROLOGIC    HUS dates and results    EEG dates and results    6.  HEMATOLOGY    Babe is not anemic.  Most recent hemoglobin and hematocrit values were:  No results found for: HCT  No results found for: HGB    Continue to monitor.    Transfusions: none    7.  HEALTH CARE MAINTENANCE    Screenings & Procedures   Immunizations:   Most Recent Immunizations   Administered Date(s) Administered   • Hep B, adolescent or pediatric 2019   Deferred Date(s) Deferred   • Hepatitis B Immune Globulin 2019     Hepatitis B: Administered (19 1500)   Hearing Test: Refer R;Refer L (19 8840)  CCHD Screening: Normal (19 5510)  Circumcision:    Synagis Candidate:    State [place] Screening Tests:   Phenylketonuria   Date/Time Value Ref Range Status   2019 06:15 PM NORMAL NORMAL Final     Maple Syrup Urn Disease   Date/Time Value Ref Range Status   2019 06:15 PM NORMAL NORMAL Final     Homocystinuria   Date/Time Value Ref Range Status   2019 06:15 PM NORMAL NORMAL Final     Citrullinemia/ASA   Date/Time Value Ref Range  Status   2019 06:15 PM NORMAL NORMAL Final     Tyrosinemia I Succinylacetone   Date/Time Value Ref Range Status   2019 06:15 PM NORMAL NORMAL Final     Tyrosinemia II III Tyrosine   Date/Time Value Ref Range Status   2019 06:15 PM NORMAL NORMAL Final     Hypothyroidism   Date/Time Value Ref Range Status   2019 06:15 PM NORMAL NORMAL Final     Adrenal Hyperplasia   Date/Time Value Ref Range Status   2019 06:15 PM NORMAL NORMAL Final     Cystic Fibrosis   Date/Time Value Ref Range Status   2019 06:15 PM NORMAL NORMAL Final     Hemoglobinopathies   Date/Time Value Ref Range Status   2019 06:15 PM NORMAL NORMAL Final     Fatty Acid Oxidation   Date/Time Value Ref Range Status   2019 06:15 PM NORMAL NORMAL Final     Organic Acidemias   Date/Time Value Ref Range Status   2019 06:15 PM NORMAL NORMAL Final     Galactosemia/Galactose   Date/Time Value Ref Range Status   2019 06:15 PM NORMAL NORMAL Final     Biotinidase Def.   Date/Time Value Ref Range Status   2019 06:15 PM NORMAL NORMAL Final     SCID Immuno Def   Date/Time Value Ref Range Status   2019 06:15 PM NORMAL NORMAL Final     Received At [facility]   Date/Time Value Ref Range Status   2019 06:15 PM 2019 1044  Final     Spinal Muscular Atrophy, SMN1   Date/Time Value Ref Range Status   2019 06:15 PM NORMAL NORMAL Final         8.  SOCIAL    The parent(s) have spoken with the nursing staff and have received updates from myself at the bedside .       Leigh Ann Diaz MD   1/3/2020   7:15 AM

## 2022-03-03 NOTE — PROGRESS NOTES
Subjective:   Patient ID:  Mitch Whittaker is a 66 y.o. male who presents for follow-up of Chronic combined systolic and diastolic congestive heart fa    Primary Cardiologist Dr. Muhammad     Cardiac Problems  1. Chronic combined systolic and diastolic heart failure//HFrEF; stage C, FC 3; EF 40% (2/2022)  2. CAD   3. Hypertension   4. LBBB  5. CKD 3  6. S/p living donor renal transplant (6/12/2015)  7. H/o DVT  8. DM 2  9. Anemia   10. Morbid obesity   11. Chronic venous insufficiency   12. MARION  13. Hypotension     HPI   Mr. Whittaker presents today for routine hospital discharge follow up. He was in the ED after our last appt on 2/25/22 after being hypotensive 60/40. At the time I suspected overdiuresis. He was given IVF and improved. He also had an ANGELITO and hypokalemia while in the ED. Has not had f/u labs since he left ED, will follow up BMP today. He is still taking 2.5mg PO metolazone every other day, but his lasix was decreased to 40mg BID. He tells me he took PO potassium x5 days only. Again will follow up BMP today. Overall, the patient is feeling well and offers no cardiac complaints today. His BP is still lower end normal. He mowed his lawn yesterday without issue, however does endorse occasional dizziness. I am still suspicious of overdiuresis.     Patient denies new or worsening chest pain/tightness, SOB, STEELE, PND, orthopnea, edema, palpitations, lightheadedness, dizziness, fainting, vision changes, weakness, or abnormal bleeding.     Patient states they are compliant with all medications and offers no side effects.     Review of Systems   Constitutional: Negative.   HENT: Negative.    Eyes: Negative.  Negative for blurred vision, double vision, vision loss in left eye, vision loss in right eye, visual disturbance and visual halos.   Cardiovascular: Negative.  Negative for chest pain, claudication, cyanosis, dyspnea on exertion, irregular heartbeat, leg swelling, near-syncope, orthopnea, palpitations and  paroxysmal nocturnal dyspnea.   Respiratory: Negative for cough, hemoptysis, shortness of breath, sleep disturbances due to breathing and wheezing.    Endocrine: Negative.    Hematologic/Lymphatic: Negative.  Negative for bleeding problem. Does not bruise/bleed easily.   Skin: Negative.  Negative for nail changes.   Musculoskeletal: Negative.  Negative for falls, muscle cramps, muscle weakness and myalgias.   Gastrointestinal: Negative.  Negative for bloating, abdominal pain, anorexia, hematemesis, hematochezia, melena, nausea and vomiting.   Genitourinary: Negative.  Negative for dysuria, flank pain and hematuria.   Neurological: Positive for dizziness. Negative for brief paralysis, focal weakness, headaches, light-headedness, numbness, paresthesias and weakness.   Psychiatric/Behavioral: Negative.    Allergic/Immunologic: Negative.        Objective:   Physical Exam  Vitals and nursing note reviewed.   Constitutional:       General: He is not in acute distress.     Appearance: Normal appearance. He is not ill-appearing.   HENT:      Head: Normocephalic and atraumatic.   Eyes:      Extraocular Movements: Extraocular movements intact.      Pupils: Pupils are equal, round, and reactive to light.   Neck:      Vascular: No carotid bruit.   Cardiovascular:      Rate and Rhythm: Normal rate and regular rhythm.      Pulses: Normal pulses.      Heart sounds: Normal heart sounds. No murmur heard.  Pulmonary:      Effort: Pulmonary effort is normal.      Breath sounds: Decreased breath sounds present.   Abdominal:      General: Bowel sounds are normal. There is no distension.      Palpations: Abdomen is soft.      Tenderness: There is no abdominal tenderness.   Musculoskeletal:         General: No swelling or tenderness. Normal range of motion.      Cervical back: Normal range of motion and neck supple. No tenderness.      Right lower leg: No edema.      Left lower leg: No edema.   Lymphadenopathy:      Cervical: No cervical  "adenopathy.   Skin:     General: Skin is warm and dry.      Capillary Refill: Capillary refill takes less than 2 seconds.   Neurological:      General: No focal deficit present.      Mental Status: He is alert and oriented to person, place, and time. Mental status is at baseline.   Psychiatric:         Mood and Affect: Mood normal.         Behavior: Behavior normal.         Thought Content: Thought content normal.         Judgment: Judgment normal.       BP (!) 90/58 (BP Location: Right arm, Patient Position: Sitting)   Pulse 102   Ht 5' 7" (1.702 m)   Wt 125.9 kg (277 lb 9 oz)   SpO2 97%   BMI 43.47 kg/m²     Assessment:     1. Chronic combined systolic and diastolic congestive heart failure    2. Coronary artery disease, unspecified vessel or lesion type, unspecified whether angina present, unspecified whether native or transplanted heart    3. Hypertension, unspecified type    4. LBBB (left bundle branch block)    5. Stage 3 chronic kidney disease, unspecified whether stage 3a or 3b CKD    6. Living-related donor kidney transplant (daughter) - 6/12/15    7. History of DVT (deep vein thrombosis)    8. DM (diabetes mellitus), type 2 with complications    9. Anemia associated with chronic renal failure    10. Morbid obesity    11. Chronic venous insufficiency of lower extremity    12. MARION (obstructive sleep apnea)    13. Hypotension, unspecified hypotension type      Labs/Imaging Data Reviewed:  1. TTE 2/18/2022  Summary  · Concentric hypertrophy and mildly decreased systolic function.  · Mild tricuspid regurgitation.  · Mild left atrial enlargement.  · The estimated ejection fraction is 40%.  · Left ventricular diastolic dysfunction.  · There is abnormal septal wall motion consistent with left bundle branch block.  · With normal right ventricular systolic function.  · Normal central venous pressure (3 mmHg).  · The estimated PA systolic pressure is 27 mmHg.    Plan:   1. Chronic combined systolic and diastolic " congestive heart failure  - pt does not appear volume overloaded today on exam  - remains hypotensive, suspect continued overdiuresis  - stop metolazone  - continue lasix 40mg BID  - f/u BMP today  - continue Entresto 97-103mg BID  - continue metoprolol XL 25mg daily   - continue GDMT once patient hemodynamically stable  - strict daily weights  - 2L daily (64oz) fluid restriction  - 2000mg daily sodium restriction  - instructed patient to call if weight gain 2-3lbs overnight or 5lbs in one week  - instructed patient to call if they develop worsening shortness of breath, increased leg swelling, increased abdominal bloating, or shortness of breath when you are sleeping     2. Coronary artery disease, unspecified vessel or lesion type, unspecified whether angina present, unspecified whether native or transplanted heart  - stable  - denies CP  - continue aspirin 81mg daily   - continue Crestor 40mg daily     3. Hypertension, unspecified type  - pt mildly hypotensive today, asymptomatic  - suspect overdiuresis, plan to stop metolazone    4. LBBB (left bundle branch block)  - stable    5. Stage 3 chronic kidney disease, unspecified whether stage 3a or 3b CKD  - pt had ANGELITO in ED, has not had labs since  - f/u BMP today    6. Living-related donor kidney transplant (daughter) - 6/12/15  - Prograf    7. History of DVT (deep vein thrombosis)  - chronically anticoagulated on Eliquis 5mg BID    8. DM (diabetes mellitus), type 2 with complications  - last A1C 7.5 (12/2021)  - Lantus Novolog    9. Anemia associated with chronic renal failure  - stable on last labs    10. Morbid obesity  - discussed importance of weight loss as it relates to CHF management    11. Chronic venous insufficiency of lower extremity  - stable    12. MARION (obstructive sleep apnea)  - pt not compliant with cpap    13. Hypotension, unspecified hypotension type  - suspect ongoing overdiuresis  - stop metolazone   - f/u in 2 wks    14. Hypokalemia   - not  currently taking supplemental K  - f/u BMP today           Corina De La Torre, MAGALIE  [facility] [place]    This note was created using M*Modal voice recognition software that occasionally misinterpreted phrases or words. Please excuse any grammatical errors.

## 2022-03-04 LAB — TACROLIMUS BLD-MCNC: 4.4 NG/ML (ref 5–15)

## 2022-03-11 ENCOUNTER — LAB VISIT (OUTPATIENT)
Dept: LAB | Facility: HOSPITAL | Age: 67
End: 2022-03-11
Attending: INTERNAL MEDICINE
Payer: COMMERCIAL

## 2022-03-11 DIAGNOSIS — E11.8 DM (DIABETES MELLITUS), TYPE 2 WITH COMPLICATIONS: ICD-10-CM

## 2022-03-11 DIAGNOSIS — I50.42 CHRONIC COMBINED SYSTOLIC AND DIASTOLIC CONGESTIVE HEART FAILURE: ICD-10-CM

## 2022-03-11 PROCEDURE — 36415 COLL VENOUS BLD VENIPUNCTURE: CPT | Mod: PO | Performed by: INTERNAL MEDICINE

## 2022-03-11 PROCEDURE — 85025 COMPLETE CBC W/AUTO DIFF WBC: CPT | Performed by: INTERNAL MEDICINE

## 2022-03-11 PROCEDURE — 80061 LIPID PANEL: CPT | Performed by: INTERNAL MEDICINE

## 2022-03-11 PROCEDURE — 83880 ASSAY OF NATRIURETIC PEPTIDE: CPT | Performed by: INTERNAL MEDICINE

## 2022-03-11 PROCEDURE — 84443 ASSAY THYROID STIM HORMONE: CPT | Performed by: INTERNAL MEDICINE

## 2022-03-11 PROCEDURE — 83036 HEMOGLOBIN GLYCOSYLATED A1C: CPT | Performed by: INTERNAL MEDICINE

## 2022-03-11 PROCEDURE — 80053 COMPREHEN METABOLIC PANEL: CPT | Performed by: INTERNAL MEDICINE

## 2022-03-12 LAB
ALBUMIN SERPL BCP-MCNC: 3.3 G/DL (ref 3.5–5.2)
ALP SERPL-CCNC: 45 U/L (ref 55–135)
ALT SERPL W/O P-5'-P-CCNC: 13 U/L (ref 10–44)
ANION GAP SERPL CALC-SCNC: 16 MMOL/L (ref 8–16)
AST SERPL-CCNC: 13 U/L (ref 10–40)
BASOPHILS # BLD AUTO: 0.02 K/UL (ref 0–0.2)
BASOPHILS NFR BLD: 0.3 % (ref 0–1.9)
BILIRUB SERPL-MCNC: 0.3 MG/DL (ref 0.1–1)
BNP SERPL-MCNC: 77 PG/ML (ref 0–99)
BUN SERPL-MCNC: 41 MG/DL (ref 8–23)
CALCIUM SERPL-MCNC: 9.1 MG/DL (ref 8.7–10.5)
CHLORIDE SERPL-SCNC: 106 MMOL/L (ref 95–110)
CHOLEST SERPL-MCNC: 185 MG/DL (ref 120–199)
CHOLEST/HDLC SERPL: 3.8 {RATIO} (ref 2–5)
CO2 SERPL-SCNC: 26 MMOL/L (ref 23–29)
CREAT SERPL-MCNC: 2.3 MG/DL (ref 0.5–1.4)
DIFFERENTIAL METHOD: ABNORMAL
EOSINOPHIL # BLD AUTO: 0 K/UL (ref 0–0.5)
EOSINOPHIL NFR BLD: 0.5 % (ref 0–8)
ERYTHROCYTE [DISTWIDTH] IN BLOOD BY AUTOMATED COUNT: 13.3 % (ref 11.5–14.5)
EST. GFR  (AFRICAN AMERICAN): 32.5 ML/MIN/1.73 M^2
EST. GFR  (NON AFRICAN AMERICAN): 28.1 ML/MIN/1.73 M^2
ESTIMATED AVG GLUCOSE: 148 MG/DL (ref 68–131)
GLUCOSE SERPL-MCNC: 52 MG/DL (ref 70–110)
HBA1C MFR BLD: 6.8 % (ref 4–5.6)
HCT VFR BLD AUTO: 34.4 % (ref 40–54)
HDLC SERPL-MCNC: 49 MG/DL (ref 40–75)
HDLC SERPL: 26.5 % (ref 20–50)
HGB BLD-MCNC: 10 G/DL (ref 14–18)
IMM GRANULOCYTES # BLD AUTO: 0.06 K/UL (ref 0–0.04)
IMM GRANULOCYTES NFR BLD AUTO: 0.8 % (ref 0–0.5)
LDLC SERPL CALC-MCNC: 111 MG/DL (ref 63–159)
LYMPHOCYTES # BLD AUTO: 1.2 K/UL (ref 1–4.8)
LYMPHOCYTES NFR BLD: 15.1 % (ref 18–48)
MCH RBC QN AUTO: 26 PG (ref 27–31)
MCHC RBC AUTO-ENTMCNC: 29.1 G/DL (ref 32–36)
MCV RBC AUTO: 90 FL (ref 82–98)
MONOCYTES # BLD AUTO: 0.6 K/UL (ref 0.3–1)
MONOCYTES NFR BLD: 8.1 % (ref 4–15)
NEUTROPHILS # BLD AUTO: 5.8 K/UL (ref 1.8–7.7)
NEUTROPHILS NFR BLD: 75.2 % (ref 38–73)
NONHDLC SERPL-MCNC: 136 MG/DL
NRBC BLD-RTO: 0 /100 WBC
PLATELET # BLD AUTO: 200 K/UL (ref 150–450)
PMV BLD AUTO: 11.2 FL (ref 9.2–12.9)
POTASSIUM SERPL-SCNC: 4.3 MMOL/L (ref 3.5–5.1)
PROT SERPL-MCNC: 6.3 G/DL (ref 6–8.4)
RBC # BLD AUTO: 3.84 M/UL (ref 4.6–6.2)
SODIUM SERPL-SCNC: 148 MMOL/L (ref 136–145)
TRIGL SERPL-MCNC: 125 MG/DL (ref 30–150)
TSH SERPL DL<=0.005 MIU/L-ACNC: 1 UIU/ML (ref 0.4–4)
WBC # BLD AUTO: 7.7 K/UL (ref 3.9–12.7)

## 2022-03-17 ENCOUNTER — OFFICE VISIT (OUTPATIENT)
Dept: CARDIOLOGY | Facility: CLINIC | Age: 67
End: 2022-03-17
Payer: COMMERCIAL

## 2022-03-17 VITALS
HEART RATE: 104 BPM | BODY MASS INDEX: 44.18 KG/M2 | SYSTOLIC BLOOD PRESSURE: 112 MMHG | HEIGHT: 67 IN | OXYGEN SATURATION: 98 % | WEIGHT: 281.5 LBS | DIASTOLIC BLOOD PRESSURE: 68 MMHG

## 2022-03-17 DIAGNOSIS — I50.42 CHRONIC COMBINED SYSTOLIC AND DIASTOLIC CONGESTIVE HEART FAILURE: ICD-10-CM

## 2022-03-17 DIAGNOSIS — I25.10 CORONARY ARTERY DISEASE INVOLVING NATIVE CORONARY ARTERY OF NATIVE HEART WITHOUT ANGINA PECTORIS: ICD-10-CM

## 2022-03-17 DIAGNOSIS — I82.492 ACUTE DEEP VEIN THROMBOSIS (DVT) OF OTHER SPECIFIED VEIN OF LEFT LOWER EXTREMITY: ICD-10-CM

## 2022-03-17 DIAGNOSIS — E66.01 MORBID OBESITY WITH BMI OF 40.0-44.9, ADULT: ICD-10-CM

## 2022-03-17 DIAGNOSIS — N18.30 STAGE 3 CHRONIC KIDNEY DISEASE, UNSPECIFIED WHETHER STAGE 3A OR 3B CKD: ICD-10-CM

## 2022-03-17 DIAGNOSIS — I44.7 LBBB (LEFT BUNDLE BRANCH BLOCK): ICD-10-CM

## 2022-03-17 DIAGNOSIS — E11.22 HYPERTENSION ASSOCIATED WITH STAGE 3 CHRONIC KIDNEY DISEASE DUE TO TYPE 2 DIABETES MELLITUS: Primary | ICD-10-CM

## 2022-03-17 DIAGNOSIS — N18.30 HYPERTENSION ASSOCIATED WITH STAGE 3 CHRONIC KIDNEY DISEASE DUE TO TYPE 2 DIABETES MELLITUS: Primary | ICD-10-CM

## 2022-03-17 DIAGNOSIS — I12.9 HYPERTENSION ASSOCIATED WITH STAGE 3 CHRONIC KIDNEY DISEASE DUE TO TYPE 2 DIABETES MELLITUS: Primary | ICD-10-CM

## 2022-03-17 PROCEDURE — 99999 PR PBB SHADOW E&M-EST. PATIENT-LVL V: CPT | Mod: PBBFAC,,, | Performed by: NURSE PRACTITIONER

## 2022-03-17 PROCEDURE — 3066F PR DOCUMENTATION OF TREATMENT FOR NEPHROPATHY: ICD-10-PCS | Mod: CPTII,S$GLB,, | Performed by: NURSE PRACTITIONER

## 2022-03-17 PROCEDURE — 3008F BODY MASS INDEX DOCD: CPT | Mod: CPTII,S$GLB,, | Performed by: NURSE PRACTITIONER

## 2022-03-17 PROCEDURE — 99214 OFFICE O/P EST MOD 30 MIN: CPT | Mod: S$GLB,,, | Performed by: NURSE PRACTITIONER

## 2022-03-17 PROCEDURE — 3072F PR LOW RISK FOR RETINOPATHY: ICD-10-PCS | Mod: CPTII,S$GLB,, | Performed by: NURSE PRACTITIONER

## 2022-03-17 PROCEDURE — 3288F FALL RISK ASSESSMENT DOCD: CPT | Mod: CPTII,S$GLB,, | Performed by: NURSE PRACTITIONER

## 2022-03-17 PROCEDURE — 3061F PR NEG MICROALBUMINURIA RESULT DOCUMENTED/REVIEW: ICD-10-PCS | Mod: CPTII,S$GLB,, | Performed by: NURSE PRACTITIONER

## 2022-03-17 PROCEDURE — 3288F PR FALLS RISK ASSESSMENT DOCUMENTED: ICD-10-PCS | Mod: CPTII,S$GLB,, | Performed by: NURSE PRACTITIONER

## 2022-03-17 PROCEDURE — 1125F PR PAIN SEVERITY QUANTIFIED, PAIN PRESENT: ICD-10-PCS | Mod: CPTII,S$GLB,, | Performed by: NURSE PRACTITIONER

## 2022-03-17 PROCEDURE — 1159F MED LIST DOCD IN RCRD: CPT | Mod: CPTII,S$GLB,, | Performed by: NURSE PRACTITIONER

## 2022-03-17 PROCEDURE — 3078F DIAST BP <80 MM HG: CPT | Mod: CPTII,S$GLB,, | Performed by: NURSE PRACTITIONER

## 2022-03-17 PROCEDURE — 3066F NEPHROPATHY DOC TX: CPT | Mod: CPTII,S$GLB,, | Performed by: NURSE PRACTITIONER

## 2022-03-17 PROCEDURE — 1101F PR PT FALLS ASSESS DOC 0-1 FALLS W/OUT INJ PAST YR: ICD-10-PCS | Mod: CPTII,S$GLB,, | Performed by: NURSE PRACTITIONER

## 2022-03-17 PROCEDURE — 4010F ACE/ARB THERAPY RXD/TAKEN: CPT | Mod: CPTII,S$GLB,, | Performed by: NURSE PRACTITIONER

## 2022-03-17 PROCEDURE — 3074F PR MOST RECENT SYSTOLIC BLOOD PRESSURE < 130 MM HG: ICD-10-PCS | Mod: CPTII,S$GLB,, | Performed by: NURSE PRACTITIONER

## 2022-03-17 PROCEDURE — 1125F AMNT PAIN NOTED PAIN PRSNT: CPT | Mod: CPTII,S$GLB,, | Performed by: NURSE PRACTITIONER

## 2022-03-17 PROCEDURE — 99214 PR OFFICE/OUTPT VISIT, EST, LEVL IV, 30-39 MIN: ICD-10-PCS | Mod: S$GLB,,, | Performed by: NURSE PRACTITIONER

## 2022-03-17 PROCEDURE — 4010F PR ACE/ARB THEARPY RXD/TAKEN: ICD-10-PCS | Mod: CPTII,S$GLB,, | Performed by: NURSE PRACTITIONER

## 2022-03-17 PROCEDURE — 99999 PR PBB SHADOW E&M-EST. PATIENT-LVL V: ICD-10-PCS | Mod: PBBFAC,,, | Performed by: NURSE PRACTITIONER

## 2022-03-17 PROCEDURE — 3008F PR BODY MASS INDEX (BMI) DOCUMENTED: ICD-10-PCS | Mod: CPTII,S$GLB,, | Performed by: NURSE PRACTITIONER

## 2022-03-17 PROCEDURE — 3061F NEG MICROALBUMINURIA REV: CPT | Mod: CPTII,S$GLB,, | Performed by: NURSE PRACTITIONER

## 2022-03-17 PROCEDURE — 3044F HG A1C LEVEL LT 7.0%: CPT | Mod: CPTII,S$GLB,, | Performed by: NURSE PRACTITIONER

## 2022-03-17 PROCEDURE — 3044F PR MOST RECENT HEMOGLOBIN A1C LEVEL <7.0%: ICD-10-PCS | Mod: CPTII,S$GLB,, | Performed by: NURSE PRACTITIONER

## 2022-03-17 PROCEDURE — 1159F PR MEDICATION LIST DOCUMENTED IN MEDICAL RECORD: ICD-10-PCS | Mod: CPTII,S$GLB,, | Performed by: NURSE PRACTITIONER

## 2022-03-17 PROCEDURE — 3078F PR MOST RECENT DIASTOLIC BLOOD PRESSURE < 80 MM HG: ICD-10-PCS | Mod: CPTII,S$GLB,, | Performed by: NURSE PRACTITIONER

## 2022-03-17 PROCEDURE — 3072F LOW RISK FOR RETINOPATHY: CPT | Mod: CPTII,S$GLB,, | Performed by: NURSE PRACTITIONER

## 2022-03-17 PROCEDURE — 3074F SYST BP LT 130 MM HG: CPT | Mod: CPTII,S$GLB,, | Performed by: NURSE PRACTITIONER

## 2022-03-17 PROCEDURE — 1101F PT FALLS ASSESS-DOCD LE1/YR: CPT | Mod: CPTII,S$GLB,, | Performed by: NURSE PRACTITIONER

## 2022-03-17 RX ORDER — METOLAZONE 2.5 MG/1
2.5 TABLET ORAL DAILY PRN
Qty: 30 TABLET | Refills: 11 | Status: SHIPPED | OUTPATIENT
Start: 2022-03-17 | End: 2023-05-25

## 2022-03-17 NOTE — PROGRESS NOTES
Subjective:   Patient ID:  Mitch Whittaker is a 66 y.o. male who presents for evaluation of Congestive Heart Failure      HPI      Primary Cardiologist Dr. Muhammad     Cardiac Problems  1. Chronic combined systolic and diastolic heart failure//HFrEF; stage C, FC 3; EF 40% (2/2022)  2. CAD   3. Hypertension   4. LBBB  5. CKD 3  6. S/p living donor renal transplant (6/12/2015)  7. H/o DVT  8. DM 2  9. Anemia   10. Morbid obesity   11. Chronic venous insufficiency   12. MARION  13. Hypotension    He returns today and states he is doing well. Has gained 4 pounds since stopping metolazone. Has been more compliant with dietary and fluid restrictions.   Denies chest pain or anginal equivalents. No shortness of breath, STEELE or palpitations. Has been walking 10 laps around the house every other commercial without any issues. Has some trace leg swelling today in office. Has been sleeping in his recliner without any complaints. No orthopnea, PND or abdominal bloating.   Reports compliance with medications. No signs of abnormal bleeding on Eliquis.         Past Medical History:   Diagnosis Date    Acquired renal cyst of left kidney     Anemia associated with chronic renal failure     CAD (coronary artery disease)     nonobstructive c 9/14    CHF (congestive heart failure)     Chronic immunosuppression with Prograf and MMF 6/18/2015    Chronic venous insufficiency of lower extremity     CKD (chronic kidney disease) stage 3, GFR 30-59 ml/min     Diabetic retinopathy     DM (diabetes mellitus), type 2 with complications 1994    Edema     End stage kidney disease     s/p transplant, doing well    Gallbladder polyp     Heart failure, diastolic, due to HTN     Hemodialysis status     off since transplant    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 RNA NEGATIVE 5/2015    History of colon polyps     HPTH (hyperparathyroidism)     Hyperlipidemia     Hypertension associated with stage 3  chronic kidney disease due to type 2 diabetes mellitus     LBBB (left bundle branch block) 2021    Morbid obesity with BMI of 45.0-49.9, adult     PCO (posterior capsular opacification), left 3/4/2019    Proteinuria     resolved s/p transplant    S/P kidney transplant     Sleep apnea     Type 2 diabetes, uncontrolled, with retinopathy     Type II diabetes mellitus with renal manifestations        Past Surgical History:   Procedure Laterality Date    CARDIAC CATHETERIZATION      normal coronary    COLONOSCOPY N/A 2018    Procedure: COLONOSCOPY;  Surgeon: Chava Ronquillo MD;  Location: [facility];  Service: Endoscopy;  Laterality: N/A;    KIDNEY TRANSPLANT      RETINAL LASER PROCEDURE         Social History     Tobacco Use    Smoking status: Former Smoker     Quit date: 2013     Years since quittin.8    Smokeless tobacco: Former User     Quit date: 2013    Tobacco comment: used marijuana since [id number], stopped after started dialysis   Substance Use Topics    Alcohol use: No     Alcohol/week: 0.0 standard drinks    Drug use: No     Comment:         Family History   Problem Relation Age of Onset    Diabetes Mother     Hypertension Mother     Heart failure Mother     Kidney disease Sister         ESRD    Diabetes Sister     Diabetes Maternal Grandmother     Cancer Neg Hx      Wt Readings from Last 3 Encounters:   22 127.7 kg (281 lb 8.4 oz)   22 125.9 kg (277 lb 9 oz)   22 125.6 kg (276 lb 14.4 oz)     Temp Readings from Last 3 Encounters:   22 98.4 °F (36.9 °C) (Oral)   22 98.1 °F (36.7 °C) (Tympanic)   22 100 °F (37.8 °C) (Oral)     BP Readings from Last 3 Encounters:   22 112/68   22 (!) 90/58   22 (!) 156/64     Pulse Readings from Last 3 Encounters:   22 104   22 102   22 96     Current Outpatient Medications on File Prior to Visit   Medication Sig Dispense Refill    apixaban (ELIQUIS) 5 mg  Tab Take 1 tablet (5 mg total) by mouth 2 (two) times daily. 60 tablet 6    aspirin (ECOTRIN) 81 MG EC tablet Take 1 tablet by mouth Daily.       calcitRIOL (ROCALTROL) 0.25 MCG Cap Take 1 capsule (0.25 mcg total) by mouth once daily. 30 capsule 11    ergocalciferol (VITAMIN D2) 50,000 unit Cap Take 1 capsule (50,000 Units total) by mouth every 14 (fourteen) days. 2 capsule 11    famotidine (PEPCID) 20 MG tablet TAKE ONE TABLET BY MOUTH EVERY EVENING 30 tablet 11    fish oil-omega-3 fatty acids 300-1,000 mg capsule Take 1 g by mouth.      furosemide (LASIX) 80 MG tablet Take one-half tablet (40 mg) by mouth 2 (two) times daily. 30 tablet 11    glimepiride (AMARYL) 2 MG tablet Take 1 tablet (2 mg total) by mouth before breakfast. 90 tablet 3    insulin (LANTUS SOLOSTAR U-100 INSULIN) glargine 100 units/mL (3mL) SubQ pen Inject 35 Units into the skin 2 (two) times daily. 30 mL 11    insulin aspart U-100 (NOVOLOG FLEXPEN U-100 INSULIN) 100 unit/mL (3 mL) InPn pen Inject 25 Units into the skin 3 (three) times daily with meals. (Patient taking differently: Inject 20-30 Units into the skin 2 (two) times daily with meals.) 30 mL 11    ketoconazole (NIZORAL) 200 mg Tab Take 0.5 tablets (100 mg total) by mouth once daily. 15 tablet 9    magnesium oxide (MAG-OX) 400 mg (241.3 mg magnesium) tablet Take 1 tablet (400 mg total) by mouth once daily. 90 tablet 1    metoprolol succinate (TOPROL-XL) 25 MG 24 hr tablet Take 1 tablet (25 mg total) by mouth once daily. 30 tablet 11    multivitamin (THERAGRAN) tablet Take 1 tablet by mouth.      mycophenolate (CELLCEPT) 250 mg Cap Take 3 capsules (750 mg total) by mouth 2 (two) times daily. 180 capsule 11    potassium chloride SA (K-DUR,KLOR-CON) 20 MEQ tablet Take 2 tablets (40 mEq total) by mouth once daily. 60 tablet 3    predniSONE (DELTASONE) 5 MG tablet Take 1 tablet (5 mg total) by mouth once daily. 30 tablet 11    rosuvastatin (CRESTOR) 40 MG Tab Take 1 tablet  "(40 mg total) by mouth once daily in the evening. 90 tablet 3    sacubitriL-valsartan (ENTRESTO)  mg per tablet Take 1 tablet by mouth 2 (two) times daily. 60 tablet 3    tacrolimus (PROGRAF) 1 MG Cap Take 4 capsules (4 mg total) by mouth every morning AND 3 capsules (3 mg total) every evening. 210 capsule 11    tamsulosin (FLOMAX) 0.4 mg Cap Take 1 capsule (0.4 mg total) by mouth once daily. 30 capsule 11    BD ULTRA-FINE MINI PEN NEEDLE 31 gauge x 3/16" Ndle Use to inject insulin as needed up to 4 times daily (Patient taking differently: Use to inject insulin as needed up to 4 times daily) 100 each 3    bisacodyl (DULCOLAX) 5 mg EC tablet Take 5 mg by mouth daily as needed for Constipation.      blood sugar diagnostic Strp Use to test four times per day (Patient taking differently: Use to test four times per day) 150 strip 11    lancets 33 gauge Misc 1 Stick by Misc.(Non-Drug; Combo Route) route as directed. Contour lancets. Use to check BG 2-3 times daily. 150 each 11    pen needle, diabetic (BD INSULIN PEN NEEDLE UF SHORT) 31 gauge x 5/16" Ndle USE TO INJECT INSULIN TWICE A  each 5     Current Facility-Administered Medications on File Prior to Visit   Medication Dose Route Frequency Provider Last Rate Last Admin    acetaminophen tablet 650 mg  650 mg Oral Once PRN Sal Lopez MD        albuterol inhaler 2 puff  2 puff Inhalation Q20 Min PRN Sal Lopez MD        diphenhydrAMINE injection 25 mg  25 mg Intravenous Once PRN Sal Lopez MD        EPINEPHrine (EPIPEN) 0.3 mg/0.3 mL pen injection 0.3 mg  0.3 mg Intramuscular PRN Sal Lopez MD        methylPREDNISolone sodium succinate injection 40 mg  40 mg Intravenous Once PRN Sal Lopez MD        ondansetron disintegrating tablet 4 mg  4 mg Oral Once PRN Sal Lopez MD        sodium chloride 0.9% 500 mL flush bag   Intravenous PRN Sal Lopez MD        sodium chloride 0.9% flush 10 mL  10 mL " "Intravenous PRN Sal Lopez MD        sodium chloride 0.9% flush 10 mL  10 mL Intravenous PRN Micah Muhammad MD           Review of Systems   Constitutional: Positive for malaise/fatigue.   HENT: Negative for hearing loss and hoarse voice.    Eyes: Negative for blurred vision and visual disturbance.   Cardiovascular: Positive for leg swelling. Negative for chest pain, claudication, dyspnea on exertion, irregular heartbeat, near-syncope, orthopnea, palpitations, paroxysmal nocturnal dyspnea and syncope.   Respiratory: Negative for cough, hemoptysis, shortness of breath, sleep disturbances due to breathing, snoring and wheezing.    Endocrine: Negative for cold intolerance and heat intolerance.   Hematologic/Lymphatic: Does not bruise/bleed easily.   Skin: Negative for color change, dry skin and nail changes.   Musculoskeletal: Positive for arthritis. Negative for back pain, joint pain and myalgias.   Gastrointestinal: Negative for bloating, abdominal pain, constipation, nausea and vomiting.   Genitourinary: Negative for dysuria, flank pain, hematuria and hesitancy.   Neurological: Negative for headaches, light-headedness, loss of balance, numbness, paresthesias and weakness.   Psychiatric/Behavioral: Negative for altered mental status.   Allergic/Immunologic: Negative for environmental allergies.     /68 (BP Location: Left arm, Patient Position: Sitting)   Pulse 104   Ht 5' 7" (1.702 m)   Wt 127.7 kg (281 lb 8.4 oz)   SpO2 98%   BMI 44.09 kg/m²     Objective:   Physical Exam  Vitals and nursing note reviewed.   Constitutional:       General: He is not in acute distress.     Appearance: Normal appearance. He is well-developed. He is obese. He is not ill-appearing.   HENT:      Head: Normocephalic and atraumatic.      Nose: Nose normal.      Mouth/Throat:      Mouth: Mucous membranes are moist.   Eyes:      Pupils: Pupils are equal, round, and reactive to light.   Neck:      Thyroid: No thyromegaly.      " Vascular: No JVD.      Trachea: No tracheal deviation.   Cardiovascular:      Rate and Rhythm: Regular rhythm. Tachycardia present.      Chest Wall: PMI is not displaced.      Pulses: Intact distal pulses.           Radial pulses are 2+ on the right side and 2+ on the left side.        Dorsalis pedis pulses are 2+ on the right side and 2+ on the left side.      Heart sounds: S1 normal and S2 normal. Heart sounds not distant. No murmur heard.  Pulmonary:      Effort: Pulmonary effort is normal. No respiratory distress.      Breath sounds: Normal breath sounds and air entry. No decreased breath sounds, wheezing or rhonchi.   Abdominal:      General: Bowel sounds are normal. There is no distension.      Palpations: Abdomen is soft.      Tenderness: There is no abdominal tenderness.   Musculoskeletal:         General: Normal range of motion.      Cervical back: Full passive range of motion without pain, normal range of motion and neck supple.      Right ankle: Swelling present.      Left ankle: Swelling present.   Skin:     General: Skin is warm and dry.      Capillary Refill: Capillary refill takes less than 2 seconds.      Nails: There is no clubbing.   Neurological:      General: No focal deficit present.      Mental Status: He is alert and oriented to person, place, and time.   Psychiatric:         Mood and Affect: Mood normal.         Speech: Speech normal.         Behavior: Behavior normal.         Thought Content: Thought content normal.         Judgment: Judgment normal.         Lab Results   Component Value Date    CHOL 185 03/11/2022    CHOL 188 10/18/2021    CHOL 178 10/06/2021     Lab Results   Component Value Date    HDL 49 03/11/2022    HDL 59 10/18/2021    HDL 58 10/06/2021     Lab Results   Component Value Date    LDLCALC 111.0 03/11/2022    LDLCALC 106.6 10/18/2021    LDLCALC 106.2 10/06/2021     Lab Results   Component Value Date    TRIG 125 03/11/2022    TRIG 112 10/18/2021    TRIG 69 10/06/2021      Lab Results   Component Value Date    CHOLHDL 26.5 03/11/2022    CHOLHDL 31.4 10/18/2021    CHOLHDL 32.6 10/06/2021       Chemistry        Component Value Date/Time     (H) 03/11/2022 0730    K 4.3 03/11/2022 0730     03/11/2022 0730    CO2 26 03/11/2022 0730    BUN 41 (H) 03/11/2022 0730    CREATININE 2.3 (H) 03/11/2022 0730    GLU 52 (L) 03/11/2022 0730        Component Value Date/Time    CALCIUM 9.1 03/11/2022 0730    ALKPHOS 45 (L) 03/11/2022 0730    AST 13 03/11/2022 0730    ALT 13 03/11/2022 0730    BILITOT 0.3 03/11/2022 0730    ESTGFRAFRICA 32.5 (A) 03/11/2022 0730    EGFRNONAA 28.1 (A) 03/11/2022 0730          Lab Results   Component Value Date    TSH 0.999 03/11/2022     Lab Results   Component Value Date    INR 1.0 06/18/2015    INR 1.0 06/12/2015    INR 0.9 05/25/2015     Lab Results   Component Value Date    WBC 7.70 03/11/2022    HGB 10.0 (L) 03/11/2022    HCT 34.4 (L) 03/11/2022    MCV 90 03/11/2022     03/11/2022     Results for orders placed during the hospital encounter of 02/18/22    Echo    Interpretation Summary  · Concentric hypertrophy and mildly decreased systolic function.  · Mild tricuspid regurgitation.  · Mild left atrial enlargement.  · The estimated ejection fraction is 40%.  · Left ventricular diastolic dysfunction.  · There is abnormal septal wall motion consistent with left bundle branch block.  · With normal right ventricular systolic function.  · Normal central venous pressure (3 mmHg).  · The estimated PA systolic pressure is 27 mmHg.     Assessment:      1. Hypertension associated with stage 3 chronic kidney disease due to type 2 diabetes mellitus    2. Chronic combined systolic and diastolic congestive heart failure    3. Coronary artery disease involving native coronary artery of native heart without angina pectoris    4. LBBB (left bundle branch block)    5. Stage 3 chronic kidney disease, unspecified whether stage 3a or 3b CKD    6. Acute deep vein  thrombosis (DVT) of other specified vein of left lower extremity    7. Morbid obesity with BMI of 40.0-44.9, adult        Plan:     Resume metolazone 2.5 mg on MOnday and Fridays for now  Continue all other CHF medical therapy for now  Dash diet, 2 gm sodium restriction  Encourage daily walking  Weight loss encouraged  Monitor BP/HR at home  CHF nurse phone review in 1 week  RTC in 4 weeks for CHF    Nicole May, FNP-C [facility]    GDMT:  Entresto 97/103 BID  Toprol XL 25 mg daily  Lasix 40 mg BID  No SGLT-2 due to renal dysfunction

## 2022-03-18 ENCOUNTER — PATIENT MESSAGE (OUTPATIENT)
Dept: ENDOSCOPY | Facility: HOSPITAL | Age: 67
End: 2022-03-18
Payer: COMMERCIAL

## 2022-03-18 ENCOUNTER — TELEPHONE (OUTPATIENT)
Dept: ENDOSCOPY | Facility: HOSPITAL | Age: 67
End: 2022-03-18
Payer: COMMERCIAL

## 2022-03-18 ENCOUNTER — OFFICE VISIT (OUTPATIENT)
Dept: INTERNAL MEDICINE | Facility: CLINIC | Age: 67
End: 2022-03-18
Payer: COMMERCIAL

## 2022-03-18 VITALS
WEIGHT: 285.94 LBS | OXYGEN SATURATION: 96 % | DIASTOLIC BLOOD PRESSURE: 68 MMHG | TEMPERATURE: 98 F | SYSTOLIC BLOOD PRESSURE: 128 MMHG | HEIGHT: 67 IN | HEART RATE: 99 BPM | BODY MASS INDEX: 44.88 KG/M2

## 2022-03-18 DIAGNOSIS — I50.42 CHRONIC COMBINED SYSTOLIC AND DIASTOLIC CONGESTIVE HEART FAILURE: ICD-10-CM

## 2022-03-18 DIAGNOSIS — D89.89 LAMBDA LIGHT CHAIN DISEASE: ICD-10-CM

## 2022-03-18 DIAGNOSIS — D63.1 ANEMIA ASSOCIATED WITH CHRONIC RENAL FAILURE: ICD-10-CM

## 2022-03-18 DIAGNOSIS — Z94.0 KIDNEY TRANSPLANT STATUS, LIVING RELATED DONOR: Chronic | ICD-10-CM

## 2022-03-18 DIAGNOSIS — I44.7 LBBB (LEFT BUNDLE BRANCH BLOCK): ICD-10-CM

## 2022-03-18 DIAGNOSIS — I42.8 INFILTRATIVE CARDIOMYOPATHY: ICD-10-CM

## 2022-03-18 DIAGNOSIS — E11.8 DM (DIABETES MELLITUS), TYPE 2 WITH COMPLICATIONS: ICD-10-CM

## 2022-03-18 DIAGNOSIS — N18.30 STAGE 3 CHRONIC KIDNEY DISEASE, UNSPECIFIED WHETHER STAGE 3A OR 3B CKD: ICD-10-CM

## 2022-03-18 DIAGNOSIS — E11.22 HYPERTENSION ASSOCIATED WITH STAGE 3 CHRONIC KIDNEY DISEASE DUE TO TYPE 2 DIABETES MELLITUS: ICD-10-CM

## 2022-03-18 DIAGNOSIS — N25.81 SECONDARY HYPERPARATHYROIDISM OF RENAL ORIGIN: ICD-10-CM

## 2022-03-18 DIAGNOSIS — I25.10 CORONARY ARTERY DISEASE INVOLVING NATIVE CORONARY ARTERY OF NATIVE HEART WITHOUT ANGINA PECTORIS: ICD-10-CM

## 2022-03-18 DIAGNOSIS — I87.2 CHRONIC VENOUS INSUFFICIENCY OF LOWER EXTREMITY: ICD-10-CM

## 2022-03-18 DIAGNOSIS — E66.01 MORBID OBESITY WITH BMI OF 40.0-44.9, ADULT: ICD-10-CM

## 2022-03-18 DIAGNOSIS — Z86.010 HISTORY OF COLON POLYPS: ICD-10-CM

## 2022-03-18 DIAGNOSIS — E11.42 DIABETIC PERIPHERAL NEUROPATHY: Primary | ICD-10-CM

## 2022-03-18 DIAGNOSIS — Z79.4 TYPE 2 DIABETES MELLITUS WITH STABLE PROLIFERATIVE RETINOPATHY OF BOTH EYES, WITH LONG-TERM CURRENT USE OF INSULIN: ICD-10-CM

## 2022-03-18 DIAGNOSIS — I12.9 HYPERTENSION ASSOCIATED WITH STAGE 3 CHRONIC KIDNEY DISEASE DUE TO TYPE 2 DIABETES MELLITUS: ICD-10-CM

## 2022-03-18 DIAGNOSIS — N28.1 ACQUIRED RENAL CYST OF LEFT KIDNEY: ICD-10-CM

## 2022-03-18 DIAGNOSIS — N18.9 ANEMIA ASSOCIATED WITH CHRONIC RENAL FAILURE: ICD-10-CM

## 2022-03-18 DIAGNOSIS — Z29.89 PROPHYLACTIC IMMUNOTHERAPY: Chronic | ICD-10-CM

## 2022-03-18 DIAGNOSIS — E11.21 TYPE 2 DIABETES MELLITUS WITH DIABETIC NEPHROPATHY, UNSPECIFIED WHETHER LONG TERM INSULIN USE: ICD-10-CM

## 2022-03-18 DIAGNOSIS — N20.0 NEPHROLITHIASIS: ICD-10-CM

## 2022-03-18 DIAGNOSIS — N18.30 HYPERTENSION ASSOCIATED WITH STAGE 3 CHRONIC KIDNEY DISEASE DUE TO TYPE 2 DIABETES MELLITUS: ICD-10-CM

## 2022-03-18 DIAGNOSIS — I82.492 ACUTE DEEP VEIN THROMBOSIS (DVT) OF OTHER SPECIFIED VEIN OF LEFT LOWER EXTREMITY: ICD-10-CM

## 2022-03-18 DIAGNOSIS — G47.33 OBSTRUCTIVE SLEEP APNEA SYNDROME: ICD-10-CM

## 2022-03-18 DIAGNOSIS — E11.3553 TYPE 2 DIABETES MELLITUS WITH STABLE PROLIFERATIVE RETINOPATHY OF BOTH EYES, WITH LONG-TERM CURRENT USE OF INSULIN: ICD-10-CM

## 2022-03-18 DIAGNOSIS — Z23 NEED FOR TDAP VACCINATION: ICD-10-CM

## 2022-03-18 PROCEDURE — 99214 PR OFFICE/OUTPT VISIT, EST, LEVL IV, 30-39 MIN: ICD-10-PCS | Mod: 25,S$GLB,, | Performed by: INTERNAL MEDICINE

## 2022-03-18 PROCEDURE — 3288F FALL RISK ASSESSMENT DOCD: CPT | Mod: CPTII,S$GLB,, | Performed by: INTERNAL MEDICINE

## 2022-03-18 PROCEDURE — 4010F PR ACE/ARB THEARPY RXD/TAKEN: ICD-10-PCS | Mod: CPTII,S$GLB,, | Performed by: INTERNAL MEDICINE

## 2022-03-18 PROCEDURE — 99999 PR PBB SHADOW E&M-EST. PATIENT-LVL IV: ICD-10-PCS | Mod: PBBFAC,,, | Performed by: INTERNAL MEDICINE

## 2022-03-18 PROCEDURE — 90471 TDAP VACCINE GREATER THAN OR EQUAL TO 7YO IM: ICD-10-PCS | Mod: S$GLB,,, | Performed by: INTERNAL MEDICINE

## 2022-03-18 PROCEDURE — 3044F HG A1C LEVEL LT 7.0%: CPT | Mod: CPTII,S$GLB,, | Performed by: INTERNAL MEDICINE

## 2022-03-18 PROCEDURE — 3008F BODY MASS INDEX DOCD: CPT | Mod: CPTII,S$GLB,, | Performed by: INTERNAL MEDICINE

## 2022-03-18 PROCEDURE — 90715 TDAP VACCINE GREATER THAN OR EQUAL TO 7YO IM: ICD-10-PCS | Mod: S$GLB,,, | Performed by: INTERNAL MEDICINE

## 2022-03-18 PROCEDURE — 3061F PR NEG MICROALBUMINURIA RESULT DOCUMENTED/REVIEW: ICD-10-PCS | Mod: CPTII,S$GLB,, | Performed by: INTERNAL MEDICINE

## 2022-03-18 PROCEDURE — 3008F PR BODY MASS INDEX (BMI) DOCUMENTED: ICD-10-PCS | Mod: CPTII,S$GLB,, | Performed by: INTERNAL MEDICINE

## 2022-03-18 PROCEDURE — 3061F NEG MICROALBUMINURIA REV: CPT | Mod: CPTII,S$GLB,, | Performed by: INTERNAL MEDICINE

## 2022-03-18 PROCEDURE — 3288F PR FALLS RISK ASSESSMENT DOCUMENTED: ICD-10-PCS | Mod: CPTII,S$GLB,, | Performed by: INTERNAL MEDICINE

## 2022-03-18 PROCEDURE — 3072F LOW RISK FOR RETINOPATHY: CPT | Mod: CPTII,S$GLB,, | Performed by: INTERNAL MEDICINE

## 2022-03-18 PROCEDURE — 1159F PR MEDICATION LIST DOCUMENTED IN MEDICAL RECORD: ICD-10-PCS | Mod: CPTII,S$GLB,, | Performed by: INTERNAL MEDICINE

## 2022-03-18 PROCEDURE — 3066F NEPHROPATHY DOC TX: CPT | Mod: CPTII,S$GLB,, | Performed by: INTERNAL MEDICINE

## 2022-03-18 PROCEDURE — 1101F PR PT FALLS ASSESS DOC 0-1 FALLS W/OUT INJ PAST YR: ICD-10-PCS | Mod: CPTII,S$GLB,, | Performed by: INTERNAL MEDICINE

## 2022-03-18 PROCEDURE — 3074F SYST BP LT 130 MM HG: CPT | Mod: CPTII,S$GLB,, | Performed by: INTERNAL MEDICINE

## 2022-03-18 PROCEDURE — 3066F PR DOCUMENTATION OF TREATMENT FOR NEPHROPATHY: ICD-10-PCS | Mod: CPTII,S$GLB,, | Performed by: INTERNAL MEDICINE

## 2022-03-18 PROCEDURE — 1125F AMNT PAIN NOTED PAIN PRSNT: CPT | Mod: CPTII,S$GLB,, | Performed by: INTERNAL MEDICINE

## 2022-03-18 PROCEDURE — 1101F PT FALLS ASSESS-DOCD LE1/YR: CPT | Mod: CPTII,S$GLB,, | Performed by: INTERNAL MEDICINE

## 2022-03-18 PROCEDURE — 99214 OFFICE O/P EST MOD 30 MIN: CPT | Mod: 25,S$GLB,, | Performed by: INTERNAL MEDICINE

## 2022-03-18 PROCEDURE — 1125F PR PAIN SEVERITY QUANTIFIED, PAIN PRESENT: ICD-10-PCS | Mod: CPTII,S$GLB,, | Performed by: INTERNAL MEDICINE

## 2022-03-18 PROCEDURE — 1159F MED LIST DOCD IN RCRD: CPT | Mod: CPTII,S$GLB,, | Performed by: INTERNAL MEDICINE

## 2022-03-18 PROCEDURE — 3044F PR MOST RECENT HEMOGLOBIN A1C LEVEL <7.0%: ICD-10-PCS | Mod: CPTII,S$GLB,, | Performed by: INTERNAL MEDICINE

## 2022-03-18 PROCEDURE — 99999 PR PBB SHADOW E&M-EST. PATIENT-LVL IV: CPT | Mod: PBBFAC,,, | Performed by: INTERNAL MEDICINE

## 2022-03-18 PROCEDURE — 3078F PR MOST RECENT DIASTOLIC BLOOD PRESSURE < 80 MM HG: ICD-10-PCS | Mod: CPTII,S$GLB,, | Performed by: INTERNAL MEDICINE

## 2022-03-18 PROCEDURE — 90715 TDAP VACCINE 7 YRS/> IM: CPT | Mod: S$GLB,,, | Performed by: INTERNAL MEDICINE

## 2022-03-18 PROCEDURE — 3078F DIAST BP <80 MM HG: CPT | Mod: CPTII,S$GLB,, | Performed by: INTERNAL MEDICINE

## 2022-03-18 PROCEDURE — 3072F PR LOW RISK FOR RETINOPATHY: ICD-10-PCS | Mod: CPTII,S$GLB,, | Performed by: INTERNAL MEDICINE

## 2022-03-18 PROCEDURE — 3074F PR MOST RECENT SYSTOLIC BLOOD PRESSURE < 130 MM HG: ICD-10-PCS | Mod: CPTII,S$GLB,, | Performed by: INTERNAL MEDICINE

## 2022-03-18 PROCEDURE — 4010F ACE/ARB THERAPY RXD/TAKEN: CPT | Mod: CPTII,S$GLB,, | Performed by: INTERNAL MEDICINE

## 2022-03-18 PROCEDURE — 90471 IMMUNIZATION ADMIN: CPT | Mod: S$GLB,,, | Performed by: INTERNAL MEDICINE

## 2022-03-18 RX ORDER — SODIUM, POTASSIUM,MAG SULFATES 17.5-3.13G
1 SOLUTION, RECONSTITUTED, ORAL ORAL DAILY
Qty: 354 ML | Refills: 0 | Status: SHIPPED | OUTPATIENT
Start: 2022-03-18 | End: 2022-03-27

## 2022-03-18 NOTE — PROGRESS NOTES
HPI:  Patient is a 66-year-old man who comes today for follow-up of diabetes, hypertension, lipids, chronic kidney disease, status post renal transplant, congestive heart failure, coronary artery disease and for his annual physical.  Patient denies any chest pains, shortness of breath, or palpitations.  Patient states his blood sugars have been doing well.  He rarely has any hypoglycemic events.  He checks his blood sugar 3 times a day.  His blood pressures been well controlled at home.  He denies any other new problems or complaints at this time      Current MEDS: medcard review, verified and update  Allergies: Per the electronic medical record    Past Medical History:   Diagnosis Date    Acquired renal cyst of left kidney     Anemia associated with chronic renal failure     CAD (coronary artery disease)     nonobstructive [facility] 9/14    CHF (congestive heart failure)     Chronic immunosuppression with Prograf and MMF 6/18/2015    Chronic venous insufficiency of lower extremity     CKD (chronic kidney disease) stage 3, GFR 30-59 ml/min     Diabetic retinopathy     DM (diabetes mellitus), type 2 with complications 1994    Edema     End stage kidney disease     s/p transplant, doing well    Gallbladder polyp     Heart failure, diastolic, due to HTN     Hemodialysis status     off since transplant    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 RNA NEGATIVE 5/2015    History of colon polyps     HPTH (hyperparathyroidism)     Hyperlipidemia     Hypertension associated with stage 3 chronic kidney disease due to type 2 diabetes mellitus     LBBB (left bundle branch block) 12/20/2021    Morbid obesity with BMI of 45.0-49.9, adult     PCO (posterior capsular opacification), left 3/4/2019    Proteinuria     resolved s/p transplant    S/P kidney transplant     Sleep apnea     Type 2 diabetes, uncontrolled, with retinopathy     Type II diabetes mellitus with renal  "manifestations        Past Surgical History:   Procedure Laterality Date    CARDIAC CATHETERIZATION  2008    normal coronary    COLONOSCOPY N/A 4/5/2018    Procedure: COLONOSCOPY;  Surgeon: Chava Ronquillo MD;  Location: [facility];  Service: Endoscopy;  Laterality: N/A;    KIDNEY TRANSPLANT      RETINAL LASER PROCEDURE         SHx: per the electronic medical record    FHx: recorded in the electronic medical record    ROS:    denies any chest pains or shortness of breath. Denies any nausea, vomiting or diarrhea. Denies any fever, chills or sweats. Denies any change in weight, voice, stool, skin or hair. Denies any dysuria, dyspepsia or dysphagia. Denies any change in vision, hearing or headaches. Denies any swollen lymph nodes or loss of memory.    PE:  /68   Pulse 99   Temp 98.2 °F (36.8 °C) (Temporal)   Ht 5' 7" (1.702 m)   Wt 129.7 kg (285 lb 15 oz)   SpO2 96%   BMI 44.78 kg/m²   Gen: Well-developed, well-nourished, male, in no acute distress, oriented x3  HEENT: neck is supple, no adenopathy, carotids 2+ equal without bruits, thyroid exam normal size without nodules.  CHEST: clear to auscultation and percussion  CVS: regular rate and rhythm without significant murmur, gallop, or rubs  ABD: soft, benign, no rebound no guarding, no distention.  Bowel sounds are normal.     nontender.  No palpable masses.  No organomegaly and no audible bruits.  RECTAL:  Deferred.  EXT: no clubbing, cyanosis, or edema  LYMPH: no cervical, inguinal, or axillary adenopathy  FEET:  Diminished tactile sensation in both lower extremities.  No ulcers or pressure sores.  NEURO: gait normal.  Cranial nerves II- XII intact. No nystagmus.  Speech normal.   Gross motor and sensory unremarkable.    Lab Results   Component Value Date    WBC 7.70 03/11/2022    HGB 10.0 (L) 03/11/2022    HCT 34.4 (L) 03/11/2022     03/11/2022    CHOL 185 03/11/2022    TRIG 125 03/11/2022    HDL 49 03/11/2022    ALT 13 03/11/2022    AST 13 " 03/11/2022     (H) 03/11/2022    K 4.3 03/11/2022     03/11/2022    CREATININE 2.3 (H) 03/11/2022    BUN 41 (H) 03/11/2022    CO2 26 03/11/2022    TSH 0.999 03/11/2022    PSA 0.33 10/18/2021    INR 1.0 06/18/2015    HGBA1C 6.8 (H) 03/11/2022       Impression:  Stable medical problems below  Patient Active Problem List   Diagnosis    Anemia associated with chronic renal failure    Obesity    Acquired renal cyst of left kidney    Nephrolithiasis    Sleep apnea    Pseudophakia    CAD (coronary artery disease)    Living-related donor kidney transplant (daughter) - 6/12/15    Diabetic peripheral neuropathy    Chronic immunosuppression with Prograf, MMF and prednisone    Secondary hyperparathyroidism of renal origin    CKD (chronic kidney disease) stage 3, GFR 30-59 ml/min    Type II diabetes mellitus with renal manifestations    Hypertension associated with stage 3 chronic kidney disease due to type 2 diabetes mellitus    DM (diabetes mellitus), type 2 with complications    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, with long-term current use of insulin    Epiretinal membrane (ERM) of both eyes    History of colon polyps    Benign prostatic hyperplasia without lower urinary tract symptoms    PCO (posterior capsular opacification), left    Chronic combined systolic and diastolic congestive heart failure    Deep vein thrombosis (DVT) of lower extremity    Chronic venous insufficiency of lower extremity    Morbid obesity with BMI of 40.0-44.9, adult    Infiltrative cardiomyopathy--suspected and if amyloidosis ruled out this diagnosis should be removed from his medical record.    LBBB (left bundle branch block)    Lambda light chain disease       Plan:   Orders Placed This Encounter    (In Office Administered) Tdap Vaccine    Hemoglobin A1C    Comprehensive Metabolic Panel    Lipid Panel    CBC Auto Differential     Patient was given Tdap vaccine  today.  Medications remain same.  He will be seen again in 6 months with above lab work.  This note is generated with speech recognition software and is subject to transcription error and sound alike phrases that may be missed by proofreading.

## 2022-03-18 NOTE — TELEPHONE ENCOUNTER
Good morning, pt is trying to schedule a Colonoscopy that he is due for. Pt states that he is on Eliquis 5mg. He will need to stop medication 4 days prior to procedure considering he has a Dx code CKD. Please advise if this is suitable. Thank you for your time.

## 2022-03-20 ENCOUNTER — OFFICE VISIT (OUTPATIENT)
Dept: URGENT CARE | Facility: CLINIC | Age: 67
End: 2022-03-20
Payer: COMMERCIAL

## 2022-03-20 ENCOUNTER — HOSPITAL ENCOUNTER (EMERGENCY)
Facility: HOSPITAL | Age: 67
Discharge: HOME | End: 2022-03-20
Attending: EMERGENCY MEDICINE
Payer: COMMERCIAL

## 2022-03-20 VITALS
RESPIRATION RATE: 17 BRPM | HEART RATE: 110 BPM | OXYGEN SATURATION: 99 % | BODY MASS INDEX: 44.57 KG/M2 | WEIGHT: 284 LBS | SYSTOLIC BLOOD PRESSURE: 92 MMHG | DIASTOLIC BLOOD PRESSURE: 49 MMHG | TEMPERATURE: 98 F | HEIGHT: 67 IN

## 2022-03-20 VITALS
OXYGEN SATURATION: 98 % | DIASTOLIC BLOOD PRESSURE: 62 MMHG | SYSTOLIC BLOOD PRESSURE: 121 MMHG | WEIGHT: 281.63 LBS | BODY MASS INDEX: 44.11 KG/M2 | RESPIRATION RATE: 20 BRPM | HEART RATE: 110 BPM | TEMPERATURE: 98 F

## 2022-03-20 DIAGNOSIS — D84.9 IMMUNOSUPPRESSED STATUS: ICD-10-CM

## 2022-03-20 DIAGNOSIS — N18.9 CHRONIC KIDNEY DISEASE, UNSPECIFIED CKD STAGE: ICD-10-CM

## 2022-03-20 DIAGNOSIS — Z94.0 KIDNEY TRANSPLANT RECIPIENT: Primary | ICD-10-CM

## 2022-03-20 DIAGNOSIS — M79.604 PAIN OF RIGHT LOWER EXTREMITY: Primary | ICD-10-CM

## 2022-03-20 DIAGNOSIS — L03.116 CELLULITIS OF LEFT LOWER EXTREMITY: ICD-10-CM

## 2022-03-20 DIAGNOSIS — M79.605 LEFT LEG PAIN: ICD-10-CM

## 2022-03-20 DIAGNOSIS — M79.89 LEG SWELLING: ICD-10-CM

## 2022-03-20 DIAGNOSIS — R00.0 TACHYCARDIA: ICD-10-CM

## 2022-03-20 LAB
ALBUMIN SERPL BCP-MCNC: 3 G/DL (ref 3.5–5.2)
ALP SERPL-CCNC: 52 U/L (ref 55–135)
ALT SERPL W/O P-5'-P-CCNC: 14 U/L (ref 10–44)
ANION GAP SERPL CALC-SCNC: 15 MMOL/L (ref 8–16)
ANISOCYTOSIS BLD QL SMEAR: SLIGHT
AST SERPL-CCNC: 15 U/L (ref 10–40)
BASOPHILS # BLD AUTO: 0.02 K/UL (ref 0–0.2)
BASOPHILS NFR BLD: 0.1 % (ref 0–1.9)
BILIRUB SERPL-MCNC: 0.8 MG/DL (ref 0.1–1)
BUN SERPL-MCNC: 36 MG/DL (ref 8–23)
CALCIUM SERPL-MCNC: 8.8 MG/DL (ref 8.7–10.5)
CHLORIDE SERPL-SCNC: 103 MMOL/L (ref 95–110)
CO2 SERPL-SCNC: 20 MMOL/L (ref 23–29)
CREAT SERPL-MCNC: 2.7 MG/DL (ref 0.5–1.4)
CTP QC/QA: YES
DIFFERENTIAL METHOD: ABNORMAL
EOSINOPHIL # BLD AUTO: 0 K/UL (ref 0–0.5)
EOSINOPHIL NFR BLD: 0.1 % (ref 0–8)
ERYTHROCYTE [DISTWIDTH] IN BLOOD BY AUTOMATED COUNT: 13.4 % (ref 11.5–14.5)
EST. GFR  (AFRICAN AMERICAN): 27 ML/MIN/1.73 M^2
EST. GFR  (NON AFRICAN AMERICAN): 23 ML/MIN/1.73 M^2
GLUCOSE SERPL-MCNC: 243 MG/DL (ref 70–110)
HCT VFR BLD AUTO: 32.6 % (ref 40–54)
HCV AB SERPL QL IA: POSITIVE
HEP C VIRUS HOLD SPECIMEN: NORMAL
HGB BLD-MCNC: 9.7 G/DL (ref 14–18)
IMM GRANULOCYTES # BLD AUTO: 0.1 K/UL (ref 0–0.04)
IMM GRANULOCYTES NFR BLD AUTO: 0.7 % (ref 0–0.5)
LACTATE SERPL-SCNC: 1.7 MMOL/L (ref 0.5–2.2)
LYMPHOCYTES # BLD AUTO: 0.7 K/UL (ref 1–4.8)
LYMPHOCYTES NFR BLD: 4.9 % (ref 18–48)
MCH RBC QN AUTO: 26.1 PG (ref 27–31)
MCHC RBC AUTO-ENTMCNC: 29.8 G/DL (ref 32–36)
MCV RBC AUTO: 88 FL (ref 82–98)
MONOCYTES # BLD AUTO: 1 K/UL (ref 0.3–1)
MONOCYTES NFR BLD: 7.7 % (ref 4–15)
NEUTROPHILS # BLD AUTO: 11.6 K/UL (ref 1.8–7.7)
NEUTROPHILS NFR BLD: 86.5 % (ref 38–73)
NRBC BLD-RTO: 0 /100 WBC
OVALOCYTES BLD QL SMEAR: ABNORMAL
PLATELET # BLD AUTO: 139 K/UL (ref 150–450)
PLATELET BLD QL SMEAR: ABNORMAL
PMV BLD AUTO: 11 FL (ref 9.2–12.9)
POCT GLUCOSE: 230 MG/DL (ref 70–110)
POIKILOCYTOSIS BLD QL SMEAR: SLIGHT
POTASSIUM SERPL-SCNC: 4.3 MMOL/L (ref 3.5–5.1)
PROT SERPL-MCNC: 6.3 G/DL (ref 6–8.4)
RBC # BLD AUTO: 3.72 M/UL (ref 4.6–6.2)
SARS-COV-2 RDRP RESP QL NAA+PROBE: NEGATIVE
SODIUM SERPL-SCNC: 138 MMOL/L (ref 136–145)
WBC # BLD AUTO: 13.42 K/UL (ref 3.9–12.7)

## 2022-03-20 PROCEDURE — 3078F DIAST BP <80 MM HG: CPT | Mod: CPTII,S$GLB,, | Performed by: NURSE PRACTITIONER

## 2022-03-20 PROCEDURE — 3008F BODY MASS INDEX DOCD: CPT | Mod: CPTII,S$GLB,, | Performed by: NURSE PRACTITIONER

## 2022-03-20 PROCEDURE — 3008F PR BODY MASS INDEX (BMI) DOCUMENTED: ICD-10-PCS | Mod: CPTII,S$GLB,, | Performed by: NURSE PRACTITIONER

## 2022-03-20 PROCEDURE — 85025 COMPLETE CBC W/AUTO DIFF WBC: CPT | Performed by: EMERGENCY MEDICINE

## 2022-03-20 PROCEDURE — 1125F PR PAIN SEVERITY QUANTIFIED, PAIN PRESENT: ICD-10-PCS | Mod: CPTII,S$GLB,, | Performed by: NURSE PRACTITIONER

## 2022-03-20 PROCEDURE — 3061F PR NEG MICROALBUMINURIA RESULT DOCUMENTED/REVIEW: ICD-10-PCS | Mod: CPTII,S$GLB,, | Performed by: NURSE PRACTITIONER

## 2022-03-20 PROCEDURE — 3288F FALL RISK ASSESSMENT DOCD: CPT | Mod: CPTII,S$GLB,, | Performed by: NURSE PRACTITIONER

## 2022-03-20 PROCEDURE — 63600175 PHARM REV CODE 636 W HCPCS: Performed by: EMERGENCY MEDICINE

## 2022-03-20 PROCEDURE — 87040 BLOOD CULTURE FOR BACTERIA: CPT | Mod: 59 | Performed by: EMERGENCY MEDICINE

## 2022-03-20 PROCEDURE — 93010 ELECTROCARDIOGRAM REPORT: CPT | Mod: ,,, | Performed by: INTERNAL MEDICINE

## 2022-03-20 PROCEDURE — 1101F PT FALLS ASSESS-DOCD LE1/YR: CPT | Mod: CPTII,S$GLB,, | Performed by: NURSE PRACTITIONER

## 2022-03-20 PROCEDURE — 82962 GLUCOSE BLOOD TEST: CPT

## 2022-03-20 PROCEDURE — 96365 THER/PROPH/DIAG IV INF INIT: CPT

## 2022-03-20 PROCEDURE — 3044F PR MOST RECENT HEMOGLOBIN A1C LEVEL <7.0%: ICD-10-PCS | Mod: CPTII,S$GLB,, | Performed by: NURSE PRACTITIONER

## 2022-03-20 PROCEDURE — 3074F SYST BP LT 130 MM HG: CPT | Mod: CPTII,S$GLB,, | Performed by: NURSE PRACTITIONER

## 2022-03-20 PROCEDURE — 4010F PR ACE/ARB THEARPY RXD/TAKEN: ICD-10-PCS | Mod: CPTII,S$GLB,, | Performed by: NURSE PRACTITIONER

## 2022-03-20 PROCEDURE — 96367 TX/PROPH/DG ADDL SEQ IV INF: CPT

## 2022-03-20 PROCEDURE — 3072F PR LOW RISK FOR RETINOPATHY: ICD-10-PCS | Mod: CPTII,S$GLB,, | Performed by: NURSE PRACTITIONER

## 2022-03-20 PROCEDURE — 86803 HEPATITIS C AB TEST: CPT | Performed by: EMERGENCY MEDICINE

## 2022-03-20 PROCEDURE — 3072F LOW RISK FOR RETINOPATHY: CPT | Mod: CPTII,S$GLB,, | Performed by: NURSE PRACTITIONER

## 2022-03-20 PROCEDURE — 96366 THER/PROPH/DIAG IV INF ADDON: CPT

## 2022-03-20 PROCEDURE — 3066F NEPHROPATHY DOC TX: CPT | Mod: CPTII,S$GLB,, | Performed by: NURSE PRACTITIONER

## 2022-03-20 PROCEDURE — 3078F PR MOST RECENT DIASTOLIC BLOOD PRESSURE < 80 MM HG: ICD-10-PCS | Mod: CPTII,S$GLB,, | Performed by: NURSE PRACTITIONER

## 2022-03-20 PROCEDURE — 99285 EMERGENCY DEPT VISIT HI MDM: CPT | Mod: 25

## 2022-03-20 PROCEDURE — 80053 COMPREHEN METABOLIC PANEL: CPT | Performed by: EMERGENCY MEDICINE

## 2022-03-20 PROCEDURE — 1159F MED LIST DOCD IN RCRD: CPT | Mod: CPTII,S$GLB,, | Performed by: NURSE PRACTITIONER

## 2022-03-20 PROCEDURE — 83605 ASSAY OF LACTIC ACID: CPT | Performed by: EMERGENCY MEDICINE

## 2022-03-20 PROCEDURE — 3061F NEG MICROALBUMINURIA REV: CPT | Mod: CPTII,S$GLB,, | Performed by: NURSE PRACTITIONER

## 2022-03-20 PROCEDURE — 3066F PR DOCUMENTATION OF TREATMENT FOR NEPHROPATHY: ICD-10-PCS | Mod: CPTII,S$GLB,, | Performed by: NURSE PRACTITIONER

## 2022-03-20 PROCEDURE — 99214 PR OFFICE/OUTPT VISIT, EST, LEVL IV, 30-39 MIN: ICD-10-PCS | Mod: S$GLB,,, | Performed by: NURSE PRACTITIONER

## 2022-03-20 PROCEDURE — 25000003 PHARM REV CODE 250: Performed by: EMERGENCY MEDICINE

## 2022-03-20 PROCEDURE — 3288F PR FALLS RISK ASSESSMENT DOCUMENTED: ICD-10-PCS | Mod: CPTII,S$GLB,, | Performed by: NURSE PRACTITIONER

## 2022-03-20 PROCEDURE — 4010F ACE/ARB THERAPY RXD/TAKEN: CPT | Mod: CPTII,S$GLB,, | Performed by: NURSE PRACTITIONER

## 2022-03-20 PROCEDURE — 93005 ELECTROCARDIOGRAM TRACING: CPT

## 2022-03-20 PROCEDURE — 1125F AMNT PAIN NOTED PAIN PRSNT: CPT | Mod: CPTII,S$GLB,, | Performed by: NURSE PRACTITIONER

## 2022-03-20 PROCEDURE — 36415 COLL VENOUS BLD VENIPUNCTURE: CPT | Performed by: EMERGENCY MEDICINE

## 2022-03-20 PROCEDURE — 3044F HG A1C LEVEL LT 7.0%: CPT | Mod: CPTII,S$GLB,, | Performed by: NURSE PRACTITIONER

## 2022-03-20 PROCEDURE — 87522 HEPATITIS C REVRS TRNSCRPJ: CPT | Performed by: EMERGENCY MEDICINE

## 2022-03-20 PROCEDURE — 1159F PR MEDICATION LIST DOCUMENTED IN MEDICAL RECORD: ICD-10-PCS | Mod: CPTII,S$GLB,, | Performed by: NURSE PRACTITIONER

## 2022-03-20 PROCEDURE — 99214 OFFICE O/P EST MOD 30 MIN: CPT | Mod: S$GLB,,, | Performed by: NURSE PRACTITIONER

## 2022-03-20 PROCEDURE — U0002 COVID-19 LAB TEST NON-CDC: HCPCS | Performed by: EMERGENCY MEDICINE

## 2022-03-20 PROCEDURE — 1101F PR PT FALLS ASSESS DOC 0-1 FALLS W/OUT INJ PAST YR: ICD-10-PCS | Mod: CPTII,S$GLB,, | Performed by: NURSE PRACTITIONER

## 2022-03-20 PROCEDURE — 93010 EKG 12-LEAD: ICD-10-PCS | Mod: ,,, | Performed by: INTERNAL MEDICINE

## 2022-03-20 PROCEDURE — 3074F PR MOST RECENT SYSTOLIC BLOOD PRESSURE < 130 MM HG: ICD-10-PCS | Mod: CPTII,S$GLB,, | Performed by: NURSE PRACTITIONER

## 2022-03-20 RX ORDER — CEPHALEXIN 500 MG/1
500 CAPSULE ORAL 4 TIMES DAILY
Qty: 28 CAPSULE | Refills: 0 | Status: SHIPPED | OUTPATIENT
Start: 2022-03-20 | End: 2022-03-28 | Stop reason: ALTCHOICE

## 2022-03-20 RX ORDER — CEPHALEXIN 500 MG/1
500 CAPSULE ORAL 4 TIMES DAILY
Qty: 28 CAPSULE | Refills: 0 | Status: SHIPPED | OUTPATIENT
Start: 2022-03-20 | End: 2022-03-20 | Stop reason: SDUPTHER

## 2022-03-20 RX ORDER — DOXYCYCLINE 100 MG/1
100 CAPSULE ORAL 2 TIMES DAILY
Qty: 14 CAPSULE | Refills: 0 | Status: SHIPPED | OUTPATIENT
Start: 2022-03-20 | End: 2022-03-28 | Stop reason: ALTCHOICE

## 2022-03-20 RX ORDER — CEFEPIME HYDROCHLORIDE 1 G/50ML
2 INJECTION, SOLUTION INTRAVENOUS
Status: COMPLETED | OUTPATIENT
Start: 2022-03-20 | End: 2022-03-20

## 2022-03-20 RX ORDER — CLINDAMYCIN HYDROCHLORIDE 300 MG/1
300 CAPSULE ORAL EVERY 6 HOURS
Qty: 28 CAPSULE | Refills: 0 | Status: SHIPPED | OUTPATIENT
Start: 2022-03-20 | End: 2022-03-28 | Stop reason: ALTCHOICE

## 2022-03-20 RX ADMIN — VANCOMYCIN HYDROCHLORIDE 2000 MG: 500 INJECTION, POWDER, LYOPHILIZED, FOR SOLUTION INTRAVENOUS at 03:03

## 2022-03-20 RX ADMIN — CEFEPIME HYDROCHLORIDE 2 G: 2 INJECTION, SOLUTION INTRAVENOUS at 01:03

## 2022-03-20 NOTE — ED PROVIDER NOTES
Encounter Date: 3/20/2022       History     Chief Complaint   Patient presents with    Foot Pain     Left foot pain and swelling x 4 days, denies injury; also c/o foot swelling.  Seen at urgent care this morning and sent here for concern for blood clot.     Patient is a 66-year-old male who presents today with complaints of left lower extremity pain and swelling for 4 days.  He reports that he was recently taken off of his diuretics because it was hurting the kidneys.  He reports having increased pedal edema since his diuretics were stopped.  He reported that he had a blister formed and then pop in formal wound to his left lateral leg.  He has a covered with a Band-Aid currently.  He also reports some erythema to that left lower extremity.  He does have some swelling to the right lower extremity, but left is significantly worse.  He does have a history of a prior DVT in 2019. He reports compliance with his Eliquis.  He presented to an urgent care today for evaluation and was referred to the emergency department to rule out DVT.  Patient denies any fever/chills, chest pain, shortness of breath, and all other symptoms.  Of note patient is a kidney transplant patient and is taking immunosuppressant medications.        Review of patient's allergies indicates:   Allergen Reactions    Lisinopril Other (See Comments)     Other reaction(s):  cough    Actos  [pioglitazone] Other (See Comments)     Other reaction(s): CHF    Metformin Other (See Comments)     Other reaction(s): renal insuff  Other reaction(s): CHF     Past Medical History:   Diagnosis Date    Acquired renal cyst of left kidney     Anemia associated with chronic renal failure     CAD (coronary artery disease)     nonobstructive [facility] 9/14    CHF (congestive heart failure)     Chronic immunosuppression with Prograf and MMF 6/18/2015    Chronic venous insufficiency of lower extremity     CKD (chronic kidney disease) stage 3, GFR 30-59 ml/min      Diabetic retinopathy     DM (diabetes mellitus), type 2 with complications     Edema     End stage kidney disease     s/p transplant, doing well    Gallbladder polyp     Heart failure, diastolic, due to HTN     Hemodialysis status     off since transplant    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 RNA NEGATIVE 2015    History of colon polyps     HPTH (hyperparathyroidism)     Hyperlipidemia     Hypertension associated with stage 3 chronic kidney disease due to type 2 diabetes mellitus     LBBB (left bundle branch block) 2021    Morbid obesity with BMI of 45.0-49.9, adult     PCO (posterior capsular opacification), left 3/4/2019    Proteinuria     resolved s/p transplant    S/P kidney transplant     Sleep apnea     Type 2 diabetes, uncontrolled, with retinopathy     Type II diabetes mellitus with renal manifestations      Past Surgical History:   Procedure Laterality Date    CARDIAC CATHETERIZATION      normal coronary    COLONOSCOPY N/A 2018    Procedure: COLONOSCOPY;  Surgeon: Chava Ronquillo MD;  Location: [facility];  Service: Endoscopy;  Laterality: N/A;    KIDNEY TRANSPLANT      RETINAL LASER PROCEDURE       Family History   Problem Relation Age of Onset    Diabetes Mother     Hypertension Mother     Heart failure Mother     Kidney disease Sister         ESRD    Diabetes Sister     Diabetes Maternal Grandmother     Cancer Neg Hx      Social History     Tobacco Use    Smoking status: Former Smoker     Quit date: 2013     Years since quittin.8    Smokeless tobacco: Former User     Quit date: 2013    Tobacco comment: used marijuana since [id number], stopped after started dialysis   Substance Use Topics    Alcohol use: No     Alcohol/week: 0.0 standard drinks    Drug use: No     Comment:       Review of Systems   Constitutional: Negative for chills, diaphoresis and fever.   HENT: Negative for congestion, rhinorrhea  and sore throat.    Eyes: Negative for pain, redness and visual disturbance.   Respiratory: Negative for cough and shortness of breath.    Cardiovascular: Positive for leg swelling (L>R). Negative for chest pain and palpitations.   Gastrointestinal: Negative for abdominal distention, abdominal pain, constipation, diarrhea, nausea and vomiting.   Genitourinary: Negative for dysuria and hematuria.   Musculoskeletal:        LLE pain/swelling/erythema   Skin: Positive for color change (erythema LLE) and wound (lateral LLE).   Neurological: Negative for seizures, syncope and headaches.   All other systems reviewed and are negative.      Physical Exam     Initial Vitals [03/20/22 1016]   BP Pulse Resp Temp SpO2   (!) 113/46 (!) 114 18 98.1 °F (36.7 °C) 98 %      MAP       --         Physical Exam    Nursing note and vitals reviewed.  Constitutional: No distress.   Increased BMO   HENT:   Head: Normocephalic and atraumatic.   Mouth/Throat: Oropharynx is clear and moist.   Eyes: Conjunctivae and EOM are normal. Pupils are equal, round, and reactive to light.   Neck: Neck supple. No tracheal deviation present.   Cardiovascular: Regular rhythm, normal heart sounds and intact distal pulses.   Tachycardia   Pulmonary/Chest: Breath sounds normal. No respiratory distress.   Abdominal: Abdomen is soft. He exhibits no distension. There is no abdominal tenderness. There is no rebound and no guarding.   Musculoskeletal:         General: Tenderness (lower LLE) and edema (lower LLE) present. Normal range of motion.      Cervical back: Neck supple.     Neurological: He is alert and oriented to person, place, and time. GCS score is 15. GCS eye subscore is 4. GCS verbal subscore is 5. GCS motor subscore is 6.   No focal deficits   Skin: Skin is warm. There is erythema.   Small nickel sized wound to the lateral left lower leg with surrounding erythema.  No purulence   Psychiatric: He has a normal mood and affect. His behavior is normal.          ED Course   Procedures  Labs Reviewed   HEPATITIS C ANTIBODY - Abnormal; Notable for the following components:       Result Value    Hepatitis C Ab Positive (*)     All other components within normal limits    Narrative:     Release to patient->Immediate   CBC W/ AUTO DIFFERENTIAL - Abnormal; Notable for the following components:    WBC 13.42 (*)     RBC 3.72 (*)     Hemoglobin 9.7 (*)     Hematocrit 32.6 (*)     MCH 26.1 (*)     MCHC 29.8 (*)     Platelets 139 (*)     Immature Granulocytes 0.7 (*)     Gran # (ANC) 11.6 (*)     Immature Grans (Abs) 0.10 (*)     Lymph # 0.7 (*)     Gran % 86.5 (*)     Lymph % 4.9 (*)     All other components within normal limits   COMPREHENSIVE METABOLIC PANEL - Abnormal; Notable for the following components:    CO2 20 (*)     Glucose 243 (*)     BUN 36 (*)     Creatinine 2.7 (*)     Albumin 3.0 (*)     Alkaline Phosphatase 52 (*)     eGFR if  27 (*)     eGFR if non  23 (*)     All other components within normal limits   CULTURE, BLOOD   CULTURE, BLOOD   HEP C VIRUS HOLD SPECIMEN    Narrative:     Release to patient->Immediate   LACTIC ACID, PLASMA   HEPATITIS C RNA, QUANTITATIVE, PCR   SARS-COV-2 RNA AMPLIFICATION, QUAL        ECG Results          EKG 12-lead (In process)  Result time 03/20/22 11:03:20    In process by Interface, Lab In [facility] (03/20/22 11:03:20)                 Narrative:    Test Reason : R00.0,    Vent. Rate : 109 BPM     Atrial Rate : 109 BPM     P-R Int : 162 ms          QRS Dur : 144 ms      QT Int : 378 ms       P-R-T Axes : 051 116 -25 degrees     QTc Int : 509 ms    Sinus tachycardia  Right axis deviation  Nonspecific intraventricular block  T wave abnormality, consider inferolateral ischemia  Abnormal ECG  When compared with ECG of 25-FEB-2022 10:39,  The axis Shifted right    Referred By: [email or web address]   SELF           Confirmed By:                             Imaging Results          US Lower Extremity Veins Left  (Final result)  Result time 03/20/22 12:58:39    Final result by Javy Loera MD (03/20/22 12:58:39)                 Impression:      No sonographic evidence of deep venous thrombosis in the left lower extremity.      Electronically signed by: Javy Loera  Date:    03/20/2022  Time:    12:58             Narrative:    EXAMINATION:  US LOWER EXTREMITY VEINS LEFT    CLINICAL HISTORY:  Pain in left leg    TECHNIQUE:  Duplex and color flow Doppler evaluation and graded compression of the left lower extremity veins was performed.    COMPARISON:  Bilateral lower extremity venous ultrasound 11/30/2015    FINDINGS:  Left thigh veins: The common femoral, femoral, popliteal, upper greater saphenous, and deep femoral veins are patent and free of thrombus. The veins are normally compressible and have normal phasic flow and augmentation response.    Left calf veins: The visualized calf veins are patent.    Contralateral CFV: The contralateral (right) common femoral vein is patent and free of thrombus.    Miscellaneous: None                              EKG reviewed interpreted by ED provider as sinus tachycardia with a rate of 109, wide QRS complex, no STEMI criteria.  No significant changes when compared to prior EKG        1:43 PM:  Patient is immunosuppressed has mild leukocytosis and mild tachycardia.  Due to all this I thought patient would be best treated with IV antibiotics and observation with transition to p.o. antibiotics at a later time.  Patient family agreeable.  Discussed with Hospital Medicine Thuy Ricketts, who recommended 1 dose of IV antibiotics in the emergency department and then discharge and follow-up with ID.  Declined admission due to patient not failing outpatient p.o. antibiotics.  IV vancomycin and IV cefepime given here in the emergency department.  Will discharge home on Keflex with doxycycline for MRSA coverage and advised close follow-up within 48 hours.  ER return precautions if any  worsening symptoms or with no improvement after 48 hours.  Discussed case with transplant services at [facility], who advised that he hold CellCept at this time until he follows up his told to restarted by his nephrologist and/or transplant team.  Patient advised close follow-up        Medications   vancomycin - pharmacy to dose (has no administration in time range)   vancomycin 2 g in dextrose 5 % 500 mL IVPB (2,000 mg Intravenous New Bag 3/20/22 1510)   cefepime in dextrose 5 % IVPB 2 g (0 g Intravenous Stopped 3/20/22 1429)                          Clinical Impression:   Final diagnoses:  [M79.605] Left leg pain  [R00.0] Tachycardia  [Z94.0] Kidney transplant recipient (Primary)  [L03.116] Cellulitis of left lower extremity  [N18.9] Chronic kidney disease, unspecified CKD stage  [D84.9] Immunosuppressed status          ED Disposition Condition    Discharge Stable        ED Prescriptions     Medication Sig Dispense Start Date End Date Auth. Provider    cephALEXin (KEFLEX) 500 MG capsule  (Status: Discontinued) Take 1 capsule (500 mg total) by mouth 4 (four) times daily. for 7 days 28 capsule 3/20/2022 3/20/2022 Andrew Red MD    clindamycin (CLEOCIN) 300 MG capsule Take 1 capsule (300 mg total) by mouth every 6 (six) hours. for 7 days 28 capsule 3/20/2022 3/27/2022 Andrew Red MD    cephALEXin (KEFLEX) 500 MG capsule Take 1 capsule (500 mg total) by mouth 4 (four) times daily. for 7 days 28 capsule 3/20/2022 3/27/2022 Andrew Red MD    doxycycline (VIBRAMYCIN) 100 MG Cap Take 1 capsule (100 mg total) by mouth 2 (two) times daily. for 7 days 14 capsule 3/20/2022 3/27/2022 Andrew Red MD        Follow-up Information     Follow up With Specialties Details Why Contact Info    Stu Benton MD Infectious Diseases, Hospitalist In 2 days  [id number]  [facility] [address] [phone]      Cornelio Ham MD Internal Medicine In 2 days  [address]  [address]  [facility]  [id number]  [phone]             Andrew Red MD  03/20/22 3255

## 2022-03-20 NOTE — PROGRESS NOTES
Pharmacokinetic Initial Assessment: IV Vancomycin    Assessment/Plan:    Initiate intravenous vancomycin with loading dose of 2000 mg once with subsequent doses when random concentrations are less than 20 mcg/mL  Desired empiric serum trough concentration is 10 to 20 mcg/mL  Draw vancomycin random level on 3/21 at 0900.  Pharmacy will continue to follow and monitor vancomycin.      Please contact pharmacy at extension [phone] with any questions regarding this assessment.     Thank you for the consult,   Seema Allen [facility]       Patient brief summary:  Mitch Whittaker is a 66 y.o. male initiated on antimicrobial therapy with IV Vancomycin for treatment of suspected  cellulitis    Drug Allergies:   Review of patient's allergies indicates:   Allergen Reactions    Lisinopril Other (See Comments)     Other reaction(s):  cough    Actos  [pioglitazone] Other (See Comments)     Other reaction(s): CHF    Metformin Other (See Comments)     Other reaction(s): renal insuff  Other reaction(s): CHF       Actual Body Weight:   127.7 kg     Renal Function:   Estimated Creatinine Clearance: 33.6 mL/min (A) (based on SCr of 2.7 mg/dL (H)).,     Dialysis Method (if applicable):  N/A (patient has hx of HD in 2014 on TTS but no longer had HD since kidney transplant in 2015)    CBC (last 72 hours):  Recent Labs   Lab Result Units 03/20/22  1117   WBC K/uL 13.42*   Hemoglobin g/dL 9.7*   Hematocrit % 32.6*   Platelets K/uL 139*   Gran % % 86.5*   Lymph % % 4.9*   Mono % % 7.7   Eosinophil % % 0.1   Basophil % % 0.1   Differential Method  Automated       Metabolic Panel (last 72 hours):  Recent Labs   Lab Result Units 03/20/22  1117   Sodium mmol/L 138   Potassium mmol/L 4.3   Chloride mmol/L 103   CO2 mmol/L 20*   Glucose mg/dL 243*   BUN mg/dL 36*   Creatinine mg/dL 2.7*   Albumin g/dL 3.0*   Total Bilirubin mg/dL 0.8   Alkaline Phosphatase U/L 52*   AST U/L 15   ALT U/L 14       Drug levels (last 3 results):  No results for input(s):  VANCOMYCINRA, VANCOMYCINPE, VANCOMYCINTR in the last 72 hours.    Microbiologic Results:  Microbiology Results (last 7 days)       Procedure Component Value Units Date/Time    Blood culture #1 **CANNOT BE ORDERED STAT** [[id number]] Collected: 03/20/22 1117    Order Status: Sent Specimen: Blood from Peripheral, Antecubital, Left Updated: 03/20/22 1345    Blood culture #2 **CANNOT BE ORDERED STAT** [[id number]] Collected: 03/20/22 1116    Order Status: Sent Specimen: Blood from Peripheral, Antecubital, Left Updated: 03/20/22 1346

## 2022-03-20 NOTE — PHARMACY MED REC
"Admission Medication History     The home medication history was taken by Rudolph Whittaker.    You may go to "Admission" then "Reconcile Home Medications" tabs to review and/or act upon these items.      The home medication list has been updated by the Pharmacy department.    Please read ALL comments highlighted in yellow.    Please address this information as you see fit.     Feel free to contact us if you have any questions or require assistance.      Medications listed below were obtained from: Patient/family  (Not in a hospital admission)      Rudolph Whittaker  [id number]    Current Outpatient Medications on File Prior to Encounter   Medication Sig Dispense Refill Last Dose    apixaban (ELIQUIS) 5 mg Tab Take 1 tablet (5 mg total) by mouth 2 (two) times daily. 60 tablet 6 3/19/2022 at Unknown time    aspirin (ECOTRIN) 81 MG EC tablet Take 1 tablet by mouth Daily.    3/19/2022 at Unknown time    calcitRIOL (ROCALTROL) 0.25 MCG Cap Take 1 capsule (0.25 mcg total) by mouth once daily. 30 capsule 11 3/19/2022 at Unknown time    ergocalciferol (VITAMIN D2) 50,000 unit Cap Take 1 capsule (50,000 Units total) by mouth every 14 (fourteen) days. 2 capsule 11 Past Week at Unknown time    famotidine (PEPCID) 20 MG tablet TAKE ONE TABLET BY MOUTH EVERY EVENING 30 tablet 11 3/19/2022 at Unknown time    fish oil-omega-3 fatty acids 300-1,000 mg capsule Take 1 g by mouth once daily.   3/19/2022 at Unknown time    furosemide (LASIX) 80 MG tablet Take one-half tablet (40 mg) by mouth 2 (two) times daily. 30 tablet 11 3/19/2022 at Unknown time    glimepiride (AMARYL) 2 MG tablet Take 1 tablet (2 mg total) by mouth before breakfast. 90 tablet 3 3/19/2022 at Unknown time    insulin (LANTUS SOLOSTAR U-100 INSULIN) glargine 100 units/mL (3mL) SubQ pen Inject 35 Units into the skin 2 (two) times daily. 30 mL 11 3/19/2022 at Unknown time    insulin aspart U-100 (NOVOLOG FLEXPEN U-100 INSULIN) 100 unit/mL (3 mL) InPn " "pen Inject 25 Units into the skin 3 (three) times daily with meals. (Patient taking differently: Inject 20-30 Units into the skin 2 (two) times daily with meals.) 30 mL 11 3/19/2022 at Unknown time    ketoconazole (NIZORAL) 200 mg Tab Take 0.5 tablets (100 mg total) by mouth once daily. 15 tablet 9 3/19/2022 at Unknown time    magnesium oxide (MAG-OX) 400 mg (241.3 mg magnesium) tablet Take 1 tablet (400 mg total) by mouth once daily. 90 tablet 1 3/19/2022 at Unknown time    metoprolol succinate (TOPROL-XL) 25 MG 24 hr tablet Take 1 tablet (25 mg total) by mouth once daily. 30 tablet 11 3/19/2022 at Unknown time    multivitamin (THERAGRAN) tablet Take 1 tablet by mouth once daily.   3/19/2022 at Unknown time    mycophenolate (CELLCEPT) 250 mg Cap Take 3 capsules (750 mg total) by mouth 2 (two) times daily. 180 capsule 11 3/19/2022 at Unknown time    potassium chloride SA (K-DUR,KLOR-CON) 20 MEQ tablet Take 2 tablets (40 mEq total) by mouth once daily. 60 tablet 3 3/19/2022 at Unknown time    predniSONE (DELTASONE) 5 MG tablet Take 1 tablet (5 mg total) by mouth once daily. 30 tablet 11 3/19/2022 at Unknown time    rosuvastatin (CRESTOR) 40 MG Tab Take 1 tablet (40 mg total) by mouth once daily in the evening. 90 tablet 3 3/19/2022 at Unknown time    sacubitriL-valsartan (ENTRESTO)  mg per tablet Take 1 tablet by mouth 2 (two) times daily. 60 tablet 3 3/19/2022 at Unknown time    tacrolimus (PROGRAF) 1 MG Cap Take 4 capsules (4 mg total) by mouth every morning AND 3 capsules (3 mg total) every evening. 210 capsule 11 3/19/2022 at Unknown time    tamsulosin (FLOMAX) 0.4 mg Cap Take 1 capsule (0.4 mg total) by mouth once daily. 30 capsule 11 3/19/2022 at Unknown time    BD ULTRA-FINE MINI PEN NEEDLE 31 gauge x 3/16" Ndle Use to inject insulin as needed up to 4 times daily (Patient taking differently: Use to inject insulin as needed up to 4 times daily) 100 each 3     blood sugar diagnostic Strp Use " "to test four times per day (Patient taking differently: Use to test four times per day) 150 strip 11     lancets 33 gauge Misc 1 Stick by Misc.(Non-Drug; Combo Route) route as directed. Contour lancets. Use to check BG 2-3 times daily. 150 each 11     metOLazone (ZAROXOLYN) 2.5 MG tablet Take 1 tablet by mouth on Monday and Friday as directed 30 tablet 11 3/18/2022    pen needle, diabetic (BD INSULIN PEN NEEDLE UF SHORT) 31 gauge x 5/16" Ndle USE TO INJECT INSULIN TWICE A  each 5     sodium,potassium,mag sulfates (SUPREP BOWEL PREP KIT) 17.5-3.13-1.6 gram SolR Take 177 mLs by mouth once daily. for 2 days 354 mL 0                        "

## 2022-03-20 NOTE — PROGRESS NOTES
"Subjective:       Patient ID: Mitch Whittaker is a 66 y.o. male.    Vitals:  height is 5' 7" (1.702 m) and weight is 128.8 kg (284 lb). His tympanic temperature is 98.4 °F (36.9 °C). His blood pressure is 92/49 (abnormal) and his pulse is 110. His respiration is 17 and oxygen saturation is 99%.     Chief Complaint: Leg Swelling and Flank Pain (Right side)    Leg Pain   The incident occurred 2 days ago. There was no injury mechanism. Pain location: Leftcalf. The quality of the pain is described as aching. The pain has been constant since onset. Pertinent negatives include no loss of motion or loss of sensation. The symptoms are aggravated by movement, weight bearing and palpation. He has tried nothing for the symptoms.   The left lower leg has been swollen since Wednesday but yesterday developed pain in the calf.  Also states he is having right sided rib pain, but points to his right flank.  This also started yesterday. Pertinent negatives are fever, chills, cough, dysuria    Constitution: Negative for chills and fever.   HENT: Negative for facial swelling.    Eyes: Negative for blurred vision.   Respiratory: Negative for cough, shortness of breath and wheezing.    Gastrointestinal: Negative for abdominal pain and nausea.   Genitourinary: Negative for dysuria.   Musculoskeletal: Positive for joint pain, joint swelling and pain with walking.   Skin: Positive for color change and erythema. Negative for rash and hives.   Allergic/Immunologic: Negative for hives.   Neurological: Negative for disorientation.   Hematologic/Lymphatic: Positive for history of blood clots.   Psychiatric/Behavioral: Negative for disorientation and confusion.       Objective:      Physical Exam   Constitutional: He is oriented to person, place, and time.   HENT:   Head: Normocephalic and atraumatic.   Eyes: Conjunctivae are normal.   Cardiovascular: Tachycardia present.   Abdominal: Normal appearance. He exhibits no distension. There is no left " CVA tenderness and no right CVA tenderness.   Musculoskeletal: Normal range of motion.         General: Normal range of motion.      Comments: Exquisite TTP right calf. Swelling present through calf region and into right ankle significantly worse on the right side. NV intact   Neurological: He is alert and oriented to person, place, and time.   Skin: erythema         Comments: Vascular changes noted bilaterally lower legs.    Psychiatric: His behavior is normal. Mood normal.         Assessment:       1. Pain of right lower extremity    2. Leg swelling          Plan:         Pain of right lower extremity    Leg swelling         Patient has a history of a DVT. Wells score 4. Unable to schedule US on the weekend. Patient referred to the ED for evaluation of possible DVT.  Also concern for cellulitis/ sepsis. He will be transported by his daughter to [facility] ED.

## 2022-03-22 ENCOUNTER — PATIENT OUTREACH (OUTPATIENT)
Dept: ADMINISTRATIVE | Facility: OTHER | Age: 67
End: 2022-03-22
Payer: COMMERCIAL

## 2022-03-22 NOTE — PROGRESS NOTES
Health Maintenance Due   Topic Date Due    Pneumococcal Vaccines (Age 65+) (4 of 4 - PPSV23) 09/26/2020    Colorectal Cancer Screening  04/05/2021     Updates were requested from care everywhere.  Chart was reviewed for overdue Proactive [facility] Encounters ([facility]) topics (CRS, Breast Cancer Screening, Eye exam)  Health Maintenance has been updated.  LINKS immunization registry triggered.  Immunizations were reconciled.

## 2022-03-23 ENCOUNTER — LAB VISIT (OUTPATIENT)
Dept: LAB | Facility: HOSPITAL | Age: 67
End: 2022-03-23
Attending: INTERNAL MEDICINE
Payer: COMMERCIAL

## 2022-03-23 ENCOUNTER — TELEPHONE (OUTPATIENT)
Dept: URGENT CARE | Facility: CLINIC | Age: 67
End: 2022-03-23
Payer: COMMERCIAL

## 2022-03-23 ENCOUNTER — OFFICE VISIT (OUTPATIENT)
Dept: INFECTIOUS DISEASES | Facility: CLINIC | Age: 67
End: 2022-03-23
Payer: COMMERCIAL

## 2022-03-23 VITALS
HEART RATE: 116 BPM | WEIGHT: 281.5 LBS | DIASTOLIC BLOOD PRESSURE: 65 MMHG | SYSTOLIC BLOOD PRESSURE: 109 MMHG | BODY MASS INDEX: 44.09 KG/M2

## 2022-03-23 DIAGNOSIS — I50.42 CHRONIC COMBINED SYSTOLIC AND DIASTOLIC CONGESTIVE HEART FAILURE: ICD-10-CM

## 2022-03-23 DIAGNOSIS — N40.0 BENIGN PROSTATIC HYPERPLASIA WITHOUT LOWER URINARY TRACT SYMPTOMS: ICD-10-CM

## 2022-03-23 DIAGNOSIS — Z79.4 TYPE 2 DIABETES MELLITUS WITH STABLE PROLIFERATIVE RETINOPATHY OF BOTH EYES, WITH LONG-TERM CURRENT USE OF INSULIN: ICD-10-CM

## 2022-03-23 DIAGNOSIS — L03.116 CELLULITIS OF LEFT LOWER EXTREMITY: ICD-10-CM

## 2022-03-23 DIAGNOSIS — E66.01 MORBID OBESITY WITH BMI OF 40.0-44.9, ADULT: ICD-10-CM

## 2022-03-23 DIAGNOSIS — Z94.0 KIDNEY TRANSPLANTED: ICD-10-CM

## 2022-03-23 DIAGNOSIS — I82.492 ACUTE DEEP VEIN THROMBOSIS (DVT) OF OTHER SPECIFIED VEIN OF LEFT LOWER EXTREMITY: ICD-10-CM

## 2022-03-23 DIAGNOSIS — E11.3553 TYPE 2 DIABETES MELLITUS WITH STABLE PROLIFERATIVE RETINOPATHY OF BOTH EYES, WITH LONG-TERM CURRENT USE OF INSULIN: ICD-10-CM

## 2022-03-23 DIAGNOSIS — Z29.89 PROPHYLACTIC IMMUNOTHERAPY: Chronic | ICD-10-CM

## 2022-03-23 PROBLEM — L03.119 CELLULITIS OF EXTREMITY: Status: ACTIVE | Noted: 2022-03-23

## 2022-03-23 LAB
ALBUMIN SERPL BCP-MCNC: 3 G/DL (ref 3.5–5.2)
ANION GAP SERPL CALC-SCNC: 7 MMOL/L (ref 8–16)
BASOPHILS # BLD AUTO: 0.03 K/UL (ref 0–0.2)
BASOPHILS NFR BLD: 0.4 % (ref 0–1.9)
BUN SERPL-MCNC: 31 MG/DL (ref 8–23)
CALCIUM SERPL-MCNC: 9.3 MG/DL (ref 8.7–10.5)
CHLORIDE SERPL-SCNC: 106 MMOL/L (ref 95–110)
CO2 SERPL-SCNC: 27 MMOL/L (ref 23–29)
CREAT SERPL-MCNC: 2.3 MG/DL (ref 0.5–1.4)
DIFFERENTIAL METHOD: ABNORMAL
EOSINOPHIL # BLD AUTO: 0 K/UL (ref 0–0.5)
EOSINOPHIL NFR BLD: 0.6 % (ref 0–8)
ERYTHROCYTE [DISTWIDTH] IN BLOOD BY AUTOMATED COUNT: 13.1 % (ref 11.5–14.5)
EST. GFR  (AFRICAN AMERICAN): 32.5 ML/MIN/1.73 M^2
EST. GFR  (NON AFRICAN AMERICAN): 28.1 ML/MIN/1.73 M^2
GLUCOSE SERPL-MCNC: 102 MG/DL (ref 70–110)
HCT VFR BLD AUTO: 33.1 % (ref 40–54)
HEPATITIS C VIRUS (HCV) RNA DETECTION/QUANTIFICATION RT-PCR: NORMAL IU/ML
HGB BLD-MCNC: 10 G/DL (ref 14–18)
IMM GRANULOCYTES # BLD AUTO: 0.05 K/UL (ref 0–0.04)
IMM GRANULOCYTES NFR BLD AUTO: 0.7 % (ref 0–0.5)
LYMPHOCYTES # BLD AUTO: 0.9 K/UL (ref 1–4.8)
LYMPHOCYTES NFR BLD: 13.7 % (ref 18–48)
MCH RBC QN AUTO: 26.2 PG (ref 27–31)
MCHC RBC AUTO-ENTMCNC: 30.2 G/DL (ref 32–36)
MCV RBC AUTO: 87 FL (ref 82–98)
MONOCYTES # BLD AUTO: 0.6 K/UL (ref 0.3–1)
MONOCYTES NFR BLD: 9.3 % (ref 4–15)
NEUTROPHILS # BLD AUTO: 5.2 K/UL (ref 1.8–7.7)
NEUTROPHILS NFR BLD: 75.3 % (ref 38–73)
NRBC BLD-RTO: 0 /100 WBC
PHOSPHATE SERPL-MCNC: 3.3 MG/DL (ref 2.7–4.5)
PLATELET # BLD AUTO: 193 K/UL (ref 150–450)
PMV BLD AUTO: 10.9 FL (ref 9.2–12.9)
POTASSIUM SERPL-SCNC: 4.3 MMOL/L (ref 3.5–5.1)
RBC # BLD AUTO: 3.82 M/UL (ref 4.6–6.2)
SODIUM SERPL-SCNC: 140 MMOL/L (ref 136–145)
WBC # BLD AUTO: 6.86 K/UL (ref 3.9–12.7)

## 2022-03-23 PROCEDURE — 36415 COLL VENOUS BLD VENIPUNCTURE: CPT | Mod: PO | Performed by: INTERNAL MEDICINE

## 2022-03-23 PROCEDURE — 3066F NEPHROPATHY DOC TX: CPT | Mod: CPTII,S$GLB,, | Performed by: INTERNAL MEDICINE

## 2022-03-23 PROCEDURE — 3044F HG A1C LEVEL LT 7.0%: CPT | Mod: CPTII,S$GLB,, | Performed by: INTERNAL MEDICINE

## 2022-03-23 PROCEDURE — 99999 PR PBB SHADOW E&M-EST. PATIENT-LVL IV: CPT | Mod: PBBFAC,,, | Performed by: INTERNAL MEDICINE

## 2022-03-23 PROCEDURE — 1159F MED LIST DOCD IN RCRD: CPT | Mod: CPTII,S$GLB,, | Performed by: INTERNAL MEDICINE

## 2022-03-23 PROCEDURE — 3078F DIAST BP <80 MM HG: CPT | Mod: CPTII,S$GLB,, | Performed by: INTERNAL MEDICINE

## 2022-03-23 PROCEDURE — 4010F ACE/ARB THERAPY RXD/TAKEN: CPT | Mod: CPTII,S$GLB,, | Performed by: INTERNAL MEDICINE

## 2022-03-23 PROCEDURE — 3072F PR LOW RISK FOR RETINOPATHY: ICD-10-PCS | Mod: CPTII,S$GLB,, | Performed by: INTERNAL MEDICINE

## 2022-03-23 PROCEDURE — 3061F NEG MICROALBUMINURIA REV: CPT | Mod: CPTII,S$GLB,, | Performed by: INTERNAL MEDICINE

## 2022-03-23 PROCEDURE — 80197 ASSAY OF TACROLIMUS: CPT | Performed by: INTERNAL MEDICINE

## 2022-03-23 PROCEDURE — 3288F FALL RISK ASSESSMENT DOCD: CPT | Mod: CPTII,S$GLB,, | Performed by: INTERNAL MEDICINE

## 2022-03-23 PROCEDURE — 99999 PR PBB SHADOW E&M-EST. PATIENT-LVL IV: ICD-10-PCS | Mod: PBBFAC,,, | Performed by: INTERNAL MEDICINE

## 2022-03-23 PROCEDURE — 3044F PR MOST RECENT HEMOGLOBIN A1C LEVEL <7.0%: ICD-10-PCS | Mod: CPTII,S$GLB,, | Performed by: INTERNAL MEDICINE

## 2022-03-23 PROCEDURE — 80069 RENAL FUNCTION PANEL: CPT | Performed by: INTERNAL MEDICINE

## 2022-03-23 PROCEDURE — 3288F PR FALLS RISK ASSESSMENT DOCUMENTED: ICD-10-PCS | Mod: CPTII,S$GLB,, | Performed by: INTERNAL MEDICINE

## 2022-03-23 PROCEDURE — 3078F PR MOST RECENT DIASTOLIC BLOOD PRESSURE < 80 MM HG: ICD-10-PCS | Mod: CPTII,S$GLB,, | Performed by: INTERNAL MEDICINE

## 2022-03-23 PROCEDURE — 1101F PT FALLS ASSESS-DOCD LE1/YR: CPT | Mod: CPTII,S$GLB,, | Performed by: INTERNAL MEDICINE

## 2022-03-23 PROCEDURE — 3072F LOW RISK FOR RETINOPATHY: CPT | Mod: CPTII,S$GLB,, | Performed by: INTERNAL MEDICINE

## 2022-03-23 PROCEDURE — 3061F PR NEG MICROALBUMINURIA RESULT DOCUMENTED/REVIEW: ICD-10-PCS | Mod: CPTII,S$GLB,, | Performed by: INTERNAL MEDICINE

## 2022-03-23 PROCEDURE — 3008F BODY MASS INDEX DOCD: CPT | Mod: CPTII,S$GLB,, | Performed by: INTERNAL MEDICINE

## 2022-03-23 PROCEDURE — 99204 PR OFFICE/OUTPT VISIT, NEW, LEVL IV, 45-59 MIN: ICD-10-PCS | Mod: S$GLB,,, | Performed by: INTERNAL MEDICINE

## 2022-03-23 PROCEDURE — 3066F PR DOCUMENTATION OF TREATMENT FOR NEPHROPATHY: ICD-10-PCS | Mod: CPTII,S$GLB,, | Performed by: INTERNAL MEDICINE

## 2022-03-23 PROCEDURE — 4010F PR ACE/ARB THEARPY RXD/TAKEN: ICD-10-PCS | Mod: CPTII,S$GLB,, | Performed by: INTERNAL MEDICINE

## 2022-03-23 PROCEDURE — 3074F SYST BP LT 130 MM HG: CPT | Mod: CPTII,S$GLB,, | Performed by: INTERNAL MEDICINE

## 2022-03-23 PROCEDURE — 3074F PR MOST RECENT SYSTOLIC BLOOD PRESSURE < 130 MM HG: ICD-10-PCS | Mod: CPTII,S$GLB,, | Performed by: INTERNAL MEDICINE

## 2022-03-23 PROCEDURE — 99204 OFFICE O/P NEW MOD 45 MIN: CPT | Mod: S$GLB,,, | Performed by: INTERNAL MEDICINE

## 2022-03-23 PROCEDURE — 1159F PR MEDICATION LIST DOCUMENTED IN MEDICAL RECORD: ICD-10-PCS | Mod: CPTII,S$GLB,, | Performed by: INTERNAL MEDICINE

## 2022-03-23 PROCEDURE — 85025 COMPLETE CBC W/AUTO DIFF WBC: CPT | Performed by: INTERNAL MEDICINE

## 2022-03-23 PROCEDURE — 3008F PR BODY MASS INDEX (BMI) DOCUMENTED: ICD-10-PCS | Mod: CPTII,S$GLB,, | Performed by: INTERNAL MEDICINE

## 2022-03-23 PROCEDURE — 1101F PR PT FALLS ASSESS DOC 0-1 FALLS W/OUT INJ PAST YR: ICD-10-PCS | Mod: CPTII,S$GLB,, | Performed by: INTERNAL MEDICINE

## 2022-03-23 NOTE — PROGRESS NOTES
Subjective:       Patient ID: Mitch Whittaker is a 66 y.o. male.    Chief Complaint: Cellulitis    HPI   66 year old man with PMH as below..    He was seen in the Ed -3/20- Declined admission, given Iv vanco/cefepime and was discharged on doxy/keflex.  At this time, he feels better and is afebrile. He has left lower extremity swelling .' He notices blisters over the left lower extremity over the lasts several weeks  Lower extremity doppler - NO DVT .  CBc is pending at this time.     Past Medical History:   Diagnosis Date    Acquired renal cyst of left kidney     Anemia associated with chronic renal failure     CAD (coronary artery disease)     nonobstructive lhc 9/14    CHF (congestive heart failure)     Chronic immunosuppression with Prograf and MMF 06/18/2015    Chronic venous insufficiency of lower extremity     CKD (chronic kidney disease) stage 3, GFR 30-59 ml/min     Diabetic retinopathy     DM (diabetes mellitus), type 2 with complications 1994    Edema     End stage kidney disease     s/p transplant, doing well    Gallbladder polyp     Heart failure, diastolic, due to HTN     Hemodialysis status     off since transplant    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 RNA NEGATIVE 5/2015, 2021    History of colon polyps     HPTH (hyperparathyroidism)     Hyperlipidemia     Hypertension associated with stage 3 chronic kidney disease due to type 2 diabetes mellitus     LBBB (left bundle branch block) 12/20/2021    Morbid obesity with BMI of 45.0-49.9, adult     PCO (posterior capsular opacification), left 03/04/2019    Proteinuria     resolved s/p transplant    S/P kidney transplant     Sleep apnea     Type 2 diabetes, uncontrolled, with retinopathy     Type II diabetes mellitus with renal manifestations      Past Surgical History:   Procedure Laterality Date    CARDIAC CATHETERIZATION  2008    normal coronary    COLONOSCOPY N/A 4/5/2018    Procedure:  COLONOSCOPY;  Surgeon: Chava Ronquillo MD;  Location: [facility];  Service: Endoscopy;  Laterality: N/A;    KIDNEY TRANSPLANT      RETINAL LASER PROCEDURE       Family History   Problem Relation Age of Onset    Diabetes Mother     Hypertension Mother     Heart failure Mother     Kidney disease Sister         ESRD    Diabetes Sister     Diabetes Maternal Grandmother     Cancer Neg Hx      Social History     Socioeconomic History    Marital status:     Number of children: 2   Occupational History    Occupation: retired     Employer: Retired   Tobacco Use    Smoking status: Former Smoker     Quit date: 2013     Years since quittin.8    Smokeless tobacco: Former User     Quit date: 2013    Tobacco comment: used marijuana since [id number], stopped after started dialysis   Substance and Sexual Activity    Alcohol use: No     Alcohol/week: 0.0 standard drinks    Drug use: No     Comment:      Sexual activity: Never   Social History Narrative    . Lives with spouse. Has 2 children. Patient retired as  for "[employer]. He has been washing cars.     Review of Systems   Constitutional: Positive for activity change. Negative for appetite change, chills, diaphoresis, fatigue and fever.   Respiratory: Negative for apnea and chest tightness.    Musculoskeletal: Positive for gait problem and joint swelling.   Skin: Positive for rash and wound.       Objective:      Physical Exam  Vitals and nursing note reviewed.   HENT:      Head: Normocephalic and atraumatic.   Eyes:      Pupils: Pupils are equal, round, and reactive to light.   Neck:      Thyroid: No thyromegaly.   Cardiovascular:      Rate and Rhythm: Normal rate and regular rhythm.   Pulmonary:      Effort: Pulmonary effort is normal. No respiratory distress.      Breath sounds: No wheezing.   Abdominal:      General: Bowel sounds are normal.      Palpations: Abdomen is soft.      Tenderness: There is no abdominal  tenderness.   Musculoskeletal:         General: Swelling present.      Cervical back: Normal range of motion and neck supple.      Left lower leg: Edema present.      Comments: See pics   Skin:     General: Skin is warm.   Psychiatric:         Mood and Affect: Mood normal.             Assessment:       1. 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, with long-term current use of insulin     2. Chronic combined systolic and diastolic congestive heart failure     3. Morbid obesity with BMI of 40.0-44.9, adult     4. Chronic immunosuppression with Prograf, MMF and prednisone     5. Cellulitis of left lower extremity     6. Benign prostatic hyperplasia without lower urinary tract symptoms     7. Acute deep vein thrombosis (DVT) of other specified vein of left lower extremity          Plan:       Problem List Items Addressed This Visit     Chronic immunosuppression with Prograf, MMF and prednisone (Chronic)     Follow nephrology            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, with long-term current use of insulin     Continue insulin ,diabetic diet            Benign prostatic hyperplasia without lower urinary tract symptoms     On flomax           Chronic combined systolic and diastolic congestive heart failure     Cardiology follow up ,on lasixentresto          Deep vein thrombosis (DVT) of lower extremity     On Eliquis            Morbid obesity with BMI of 40.0-44.9, adult     Out patient follow up for weight loss program             Cellulitis of extremity     Will complete course of Keflex and doxycycline as ordered .

## 2022-03-24 ENCOUNTER — TELEPHONE (OUTPATIENT)
Dept: TRANSPLANT | Facility: CLINIC | Age: 67
End: 2022-03-24
Payer: COMMERCIAL

## 2022-03-24 LAB — TACROLIMUS BLD-MCNC: 4.7 NG/ML (ref 5–15)

## 2022-03-24 NOTE — TELEPHONE ENCOUNTER
Follow up on HF symptoms since resuming metolazone 2.5mg on Mon and fri. .   Pt says he only just started this today because this past weekend he ended up at urgent care with pain and swelling in LE. They thought he had a DVT-scan came back negative.   They are treating him for cellulitis now.   bp today 102/66 P-114.     I let pt know I would f/u with him in 1 week to check on vitals and symptoms again and give time for him to complete his antibiotics.

## 2022-03-24 NOTE — TELEPHONE ENCOUNTER
----- Message from Carlota Prince RN sent at 3/17/2022  5:11 PM CDT -----  Regardin week phone review

## 2022-03-25 LAB
BACTERIA BLD CULT: NORMAL
BACTERIA BLD CULT: NORMAL

## 2022-03-25 RX ORDER — MUPIROCIN 20 MG/G
OINTMENT TOPICAL 2 TIMES DAILY
Qty: 22 G | Refills: 1 | OUTPATIENT
Start: 2022-03-25

## 2022-03-28 ENCOUNTER — TELEPHONE (OUTPATIENT)
Dept: TRANSPLANT | Facility: CLINIC | Age: 67
End: 2022-03-28
Payer: COMMERCIAL

## 2022-03-28 NOTE — TELEPHONE ENCOUNTER
Return call to patient states he completed antibiotic therapy and will restart his MMF.  ---- Message from Charly Kowalski sent at 3/28/2022  9:20 AM CDT -----  Regarding: speak to nurse  Contact: Karol  Patient's daughter Karol is calling to speak with staff. Patient wants to know when he can resume his Mycophenolate (CELLCEPT) 250 mg Cap    Contact: [phone]

## 2022-03-29 RX ORDER — MUPIROCIN 20 MG/G
OINTMENT TOPICAL 2 TIMES DAILY
Qty: 22 G | Refills: 1 | OUTPATIENT
Start: 2022-03-29

## 2022-03-30 ENCOUNTER — IMMUNIZATION (OUTPATIENT)
Dept: PHARMACY | Facility: CLINIC | Age: 67
End: 2022-03-30
Payer: COMMERCIAL

## 2022-03-30 ENCOUNTER — OFFICE VISIT (OUTPATIENT)
Dept: NEPHROLOGY | Facility: CLINIC | Age: 67
End: 2022-03-30
Payer: COMMERCIAL

## 2022-03-30 VITALS
SYSTOLIC BLOOD PRESSURE: 100 MMHG | DIASTOLIC BLOOD PRESSURE: 62 MMHG | WEIGHT: 283.06 LBS | BODY MASS INDEX: 44.43 KG/M2 | RESPIRATION RATE: 20 BRPM | HEART RATE: 101 BPM | HEIGHT: 67 IN

## 2022-03-30 DIAGNOSIS — Z94.0 KIDNEY TRANSPLANTED: Primary | ICD-10-CM

## 2022-03-30 DIAGNOSIS — Z94.0 KIDNEY REPLACED BY TRANSPLANT: ICD-10-CM

## 2022-03-30 PROCEDURE — 3061F PR NEG MICROALBUMINURIA RESULT DOCUMENTED/REVIEW: ICD-10-PCS | Mod: CPTII,S$GLB,, | Performed by: INTERNAL MEDICINE

## 2022-03-30 PROCEDURE — 3072F LOW RISK FOR RETINOPATHY: CPT | Mod: CPTII,S$GLB,, | Performed by: INTERNAL MEDICINE

## 2022-03-30 PROCEDURE — 3008F PR BODY MASS INDEX (BMI) DOCUMENTED: ICD-10-PCS | Mod: CPTII,S$GLB,, | Performed by: INTERNAL MEDICINE

## 2022-03-30 PROCEDURE — 1159F MED LIST DOCD IN RCRD: CPT | Mod: CPTII,S$GLB,, | Performed by: INTERNAL MEDICINE

## 2022-03-30 PROCEDURE — 3078F PR MOST RECENT DIASTOLIC BLOOD PRESSURE < 80 MM HG: ICD-10-PCS | Mod: CPTII,S$GLB,, | Performed by: INTERNAL MEDICINE

## 2022-03-30 PROCEDURE — 1101F PT FALLS ASSESS-DOCD LE1/YR: CPT | Mod: CPTII,S$GLB,, | Performed by: INTERNAL MEDICINE

## 2022-03-30 PROCEDURE — 3074F SYST BP LT 130 MM HG: CPT | Mod: CPTII,S$GLB,, | Performed by: INTERNAL MEDICINE

## 2022-03-30 PROCEDURE — 3074F PR MOST RECENT SYSTOLIC BLOOD PRESSURE < 130 MM HG: ICD-10-PCS | Mod: CPTII,S$GLB,, | Performed by: INTERNAL MEDICINE

## 2022-03-30 PROCEDURE — 99999 PR PBB SHADOW E&M-EST. PATIENT-LVL V: ICD-10-PCS | Mod: PBBFAC,,, | Performed by: INTERNAL MEDICINE

## 2022-03-30 PROCEDURE — 3066F PR DOCUMENTATION OF TREATMENT FOR NEPHROPATHY: ICD-10-PCS | Mod: CPTII,S$GLB,, | Performed by: INTERNAL MEDICINE

## 2022-03-30 PROCEDURE — 99215 OFFICE O/P EST HI 40 MIN: CPT | Mod: S$GLB,,, | Performed by: INTERNAL MEDICINE

## 2022-03-30 PROCEDURE — 1126F PR PAIN SEVERITY QUANTIFIED, NO PAIN PRESENT: ICD-10-PCS | Mod: CPTII,S$GLB,, | Performed by: INTERNAL MEDICINE

## 2022-03-30 PROCEDURE — 1126F AMNT PAIN NOTED NONE PRSNT: CPT | Mod: CPTII,S$GLB,, | Performed by: INTERNAL MEDICINE

## 2022-03-30 PROCEDURE — 99215 PR OFFICE/OUTPT VISIT, EST, LEVL V, 40-54 MIN: ICD-10-PCS | Mod: S$GLB,,, | Performed by: INTERNAL MEDICINE

## 2022-03-30 PROCEDURE — 4010F ACE/ARB THERAPY RXD/TAKEN: CPT | Mod: CPTII,S$GLB,, | Performed by: INTERNAL MEDICINE

## 2022-03-30 PROCEDURE — 99999 PR PBB SHADOW E&M-EST. PATIENT-LVL V: CPT | Mod: PBBFAC,,, | Performed by: INTERNAL MEDICINE

## 2022-03-30 PROCEDURE — 3072F PR LOW RISK FOR RETINOPATHY: ICD-10-PCS | Mod: CPTII,S$GLB,, | Performed by: INTERNAL MEDICINE

## 2022-03-30 PROCEDURE — 3288F PR FALLS RISK ASSESSMENT DOCUMENTED: ICD-10-PCS | Mod: CPTII,S$GLB,, | Performed by: INTERNAL MEDICINE

## 2022-03-30 PROCEDURE — 3078F DIAST BP <80 MM HG: CPT | Mod: CPTII,S$GLB,, | Performed by: INTERNAL MEDICINE

## 2022-03-30 PROCEDURE — 3288F FALL RISK ASSESSMENT DOCD: CPT | Mod: CPTII,S$GLB,, | Performed by: INTERNAL MEDICINE

## 2022-03-30 PROCEDURE — 3061F NEG MICROALBUMINURIA REV: CPT | Mod: CPTII,S$GLB,, | Performed by: INTERNAL MEDICINE

## 2022-03-30 PROCEDURE — 3066F NEPHROPATHY DOC TX: CPT | Mod: CPTII,S$GLB,, | Performed by: INTERNAL MEDICINE

## 2022-03-30 PROCEDURE — 1159F PR MEDICATION LIST DOCUMENTED IN MEDICAL RECORD: ICD-10-PCS | Mod: CPTII,S$GLB,, | Performed by: INTERNAL MEDICINE

## 2022-03-30 PROCEDURE — 3044F HG A1C LEVEL LT 7.0%: CPT | Mod: CPTII,S$GLB,, | Performed by: INTERNAL MEDICINE

## 2022-03-30 PROCEDURE — 3044F PR MOST RECENT HEMOGLOBIN A1C LEVEL <7.0%: ICD-10-PCS | Mod: CPTII,S$GLB,, | Performed by: INTERNAL MEDICINE

## 2022-03-30 PROCEDURE — 4010F PR ACE/ARB THEARPY RXD/TAKEN: ICD-10-PCS | Mod: CPTII,S$GLB,, | Performed by: INTERNAL MEDICINE

## 2022-03-30 PROCEDURE — 1101F PR PT FALLS ASSESS DOC 0-1 FALLS W/OUT INJ PAST YR: ICD-10-PCS | Mod: CPTII,S$GLB,, | Performed by: INTERNAL MEDICINE

## 2022-03-30 PROCEDURE — 3008F BODY MASS INDEX DOCD: CPT | Mod: CPTII,S$GLB,, | Performed by: INTERNAL MEDICINE

## 2022-03-30 RX ORDER — TACROLIMUS 1 MG/1
4 CAPSULE ORAL EVERY 12 HOURS
Qty: 240 CAPSULE | Refills: 11 | Status: SHIPPED | OUTPATIENT
Start: 2022-03-30 | End: 2023-04-15 | Stop reason: SDUPTHER

## 2022-03-30 RX ORDER — MUPIROCIN 20 MG/G
OINTMENT TOPICAL 3 TIMES DAILY
Qty: 22 G | Refills: 0 | Status: SHIPPED | OUTPATIENT
Start: 2022-03-30 | End: 2022-10-10 | Stop reason: SDUPTHER

## 2022-03-30 RX ORDER — PROMETHAZINE HYDROCHLORIDE AND DEXTROMETHORPHAN HYDROBROMIDE 6.25; 15 MG/5ML; MG/5ML
SYRUP ORAL
COMMUNITY
End: 2023-01-27 | Stop reason: SDUPTHER

## 2022-03-30 NOTE — PROGRESS NOTES
Renal clinic f/u note:  Date of clinic visit: 3/30/22  Reason for f/u and chief c/o: h/o of kidney transplant. CHF, CKD     HPI: Pt is a 65 y/o male with h/o of living related kidney transplant from his daughter in 2015 who presents for f/u. 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 Pt presents with his daughter to renal clinic. He was last seen by us in Jan 2022. Chart was reviewed. Pt has on going issues with volume overload. He has been dependent on lasix bid and metolazone for fluid balance. Out of concern for causing over-diuresis, metolazone was previously put on hold, but had to be re-started (by cardiology) to be taken twice a week currently. Pt also has struggled with hypotension, which has made the use of these diuretics more challenging.    Since last visit, he had infected blisters in L LE. He received vancomycin x 1 and cefepime x 1 in ER, followed by outpt doxycycline and keflex. Pt has followed closely with Dr. Benton (ID) and says the infection has improved. He has no new or acute c/o's, no CP, no SOB, has some leg swelling. Labs and meds reviewed with him. Has previously had nearly asymtomatic COVID for which cellcept was put on hold. This medicine was also kept briefly on hold during the treatment for the foot infection.        PAST MEDICAL HISTORY: living related kidney transplant form daughter 6/15/2015 (on HD pre-transplant x 2.5 years), CKD stage 3, DM-2, HTN, CAD (coronary artery disease), combined diastolic and systolic (EF 40%) CHF, Chronic venous insufficiency of lower extremity, Diabetic retinopathy, Gallbladder polyp, 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, History of colon polyps, HPTH (hyperparathyroidism), Hyperlipidemia, LBBB (left bundle branch block) (12/20/2021), Morbid obesity with BMI of 45.0-49.9, adult, PCO (posterior capsular opacification), left (3/4/2019), Sleep apnea,     PAST SURGICAL HISTORY:  He  has a past surgical  "history that includes Retinal laser procedure; Cardiac catheterization (2008); Kidney transplant; and Colonoscopy (N/A, 4/5/2018).     SOCIAL HISTORY:  He  reports that he quit smoking about 8 years ago. He quit smokeless tobacco use about 8 years ago. He reports that he does not drink alcohol and does not use drugs.     FAMILY MEDICAL HISTORY:  His family history includes Diabetes in his maternal grandmother, mother, and sister; Heart failure in his mother; Hypertension in his mother; Kidney disease in his sister.           Review of patient's allergies indicates:   Allergen Reactions    Lisinopril Other (See Comments)       Other reaction(s):  cough    Actos  [pioglitazone]         Other reaction(s): chf    Metformin         Other reaction(s): renal insuff  Other reaction(s): chf      Meds reviewed    Current Outpatient Medications:     apixaban (ELIQUIS) 5 mg Tab, Take 1 tablet (5 mg total) by mouth 2 (two) times daily., Disp: 60 tablet, Rfl: 6    aspirin (ECOTRIN) 81 MG EC tablet, Take 1 tablet by mouth Daily. , Disp: , Rfl:     BD ULTRA-FINE MINI PEN NEEDLE 31 gauge x 3/16" Ndle, Use to inject insulin as needed up to 4 times daily (Patient taking differently: Use to inject insulin as needed up to 4 times daily), Disp: 100 each, Rfl: 3    blood sugar diagnostic Strp, Use to test four times per day (Patient taking differently: Use to test four times per day), Disp: 150 strip, Rfl: 11    calcitRIOL (ROCALTROL) 0.25 MCG Cap, Take 1 capsule (0.25 mcg total) by mouth once daily., Disp: 30 capsule, Rfl: 11    ergocalciferol (VITAMIN D2) 50,000 unit Cap, Take 1 capsule (50,000 Units total) by mouth every 14 (fourteen) days., Disp: 2 capsule, Rfl: 11    famotidine (PEPCID) 20 MG tablet, TAKE ONE TABLET BY MOUTH EVERY EVENING, Disp: 30 tablet, Rfl: 11    fish oil-omega-3 fatty acids 300-1,000 mg capsule, Take 1 g by mouth once daily., Disp: , Rfl:     furosemide (LASIX) 80 MG tablet, Take one-half tablet (40 " "mg) by mouth 2 (two) times daily., Disp: 30 tablet, Rfl: 11    glimepiride (AMARYL) 2 MG tablet, Take 1 tablet (2 mg total) by mouth before breakfast., Disp: 90 tablet, Rfl: 3    insulin aspart U-100 (NOVOLOG FLEXPEN U-100 INSULIN) 100 unit/mL (3 mL) InPn pen, Inject 25 Units into the skin 3 (three) times daily with meals. (Patient taking differently: Inject 20-30 Units into the skin 2 (two) times daily with meals.), Disp: 30 mL, Rfl: 11    ketoconazole (NIZORAL) 200 mg Tab, Take 0.5 tablets (100 mg total) by mouth once daily., Disp: 15 tablet, Rfl: 9    lancets 33 gauge Misc, 1 Stick by Misc.(Non-Drug; Combo Route) route as directed. Contour lancets. Use to check BG 2-3 times daily., Disp: 150 each, Rfl: 11    magnesium oxide (MAG-OX) 400 mg (241.3 mg magnesium) tablet, Take 1 tablet (400 mg total) by mouth once daily., Disp: 90 tablet, Rfl: 1    metOLazone (ZAROXOLYN) 2.5 MG tablet, Take 1 tablet by mouth on Monday and Friday as directed, Disp: 30 tablet, Rfl: 11    metoprolol succinate (TOPROL-XL) 25 MG 24 hr tablet, Take 1 tablet (25 mg total) by mouth once daily., Disp: 30 tablet, Rfl: 11    multivitamin (THERAGRAN) tablet, Take 1 tablet by mouth once daily., Disp: , Rfl:     mycophenolate (CELLCEPT) 250 mg Cap, Take 3 capsules (750 mg total) by mouth 2 (two) times daily., Disp: 180 capsule, Rfl: 11    pen needle, diabetic (BD INSULIN PEN NEEDLE UF SHORT) 31 gauge x 5/16" Ndle, USE TO INJECT INSULIN TWICE A DAY, Disp: 200 each, Rfl: 5    potassium chloride SA (K-DUR,KLOR-CON) 20 MEQ tablet, Take 2 tablets (40 mEq total) by mouth once daily., Disp: 60 tablet, Rfl: 3    predniSONE (DELTASONE) 5 MG tablet, Take 1 tablet (5 mg total) by mouth once daily., Disp: 30 tablet, Rfl: 11    promethazine-dextromethorphan (PROMETHAZINE-DM) 6.25-15 mg/5 mL Syrp, Take by mouth., Disp: , Rfl:     rosuvastatin (CRESTOR) 40 MG Tab, Take 1 tablet (40 mg total) by mouth once daily in the evening., Disp: 90 tablet, " "Rfl: 3    sacubitriL-valsartan (ENTRESTO)  mg per tablet, Take 1 tablet by mouth 2 (two) times daily., Disp: 60 tablet, Rfl: 3    tamsulosin (FLOMAX) 0.4 mg Cap, Take 1 capsule (0.4 mg total) by mouth once daily., Disp: 30 capsule, Rfl: 11    insulin (LANTUS SOLOSTAR U-100 INSULIN) glargine 100 units/mL (3mL) SubQ pen, Inject 35 Units into the skin 2 (two) times daily., Disp: 30 mL, Rfl: 11    mupirocin (BACTROBAN) 2 % ointment, Apply topically 3 (three) times daily. Use as directed on the leg wound., Disp: 22 g, Rfl: 0    pneumococcal 23-amos ps (PNEUMOVAX-23) 25 mcg/0.5 mL vaccine, Inject 0.5 mLs into the muscle once. For one dose. for 1 dose, Disp: 0.5 mL, Rfl: 0    tacrolimus (PROGRAF) 1 MG Cap, Take 4 capsules (4 mg total) by mouth every 12 (twelve) hours., Disp: 240 capsule, Rfl: 11    Current Facility-Administered Medications:     acetaminophen tablet 650 mg, 650 mg, Oral, Once PRN, Sal Lopez MD        REVIEW OF SYSTEMS:  Patient has no fever, fatigue, visual changes, chest pain, edema, cough, dyspnea, nausea, vomiting, constipation, diarrhea, arthralgias, pruritis, dizziness, weakness, depression, confusion.     PHYSICAL EXAM:  Blood pressure 100/62, pulse 101, resp. rate 20, height 5' 7" (1.702 m), weight 283 lbs, wt last visit 279 lbs.  Gen:    WDWN male in no apparent distress  Psych: Normal mood and affect  Skin:    No rashes or ulcers  Neck:   No JVD  Chest:  Clear with no rales, rhonchi, wheezing with normal effort  CV:      Regular with no murmurs, gallops or rubs  Abd:     Soft, nontender, no distension  Ext:      2+ edema     Labs reviewed  BMP  Lab Results   Component Value Date     03/23/2022    K 4.3 03/23/2022     03/23/2022    CO2 27 03/23/2022    BUN 31 (H) 03/23/2022    CREATININE 2.3 (H) 03/23/2022    CALCIUM 9.3 03/23/2022    ANIONGAP 7 (L) 03/23/2022    ESTGFRAFRICA 32.5 (A) 03/23/2022    EGFRNONAA 28.1 (A) 03/23/2022     Lab Results   Component Value Date "    WBC 6.86 03/23/2022    HGB 10.0 (L) 03/23/2022    HCT 33.1 (L) 03/23/2022    MCV 87 03/23/2022     03/23/2022     Prograf 4.7      SPEP and UPEP unremarkable     Cardiac study: 1/18/22 reviewed:  Conclusion  · Planar imaging demonstrates cardiac activity that is absent to that of the adjacent rib cage.  · Study is negative for TTR amyloidosis with an uptake ratio of 1.03.  · Perugini Score of 0     Echo 7/27/21 reviewed:  · The left ventricle is normal in size with concentric hypertrophy and mildly decreased systolic function.  · The estimated ejection fraction is 40%.  · Normal right ventricular size with normal right ventricular systolic function.  · Indeterminate left ventricular diastolic function.  · Moderate left atrial enlargement.           IMPRESSION AND RECOMMENDATIONS:  65 y/o male with prior h/o of kidney Transplant present for post hospital f/u:     1. Renal: Cr tends to fluctuate, currently within the baseline range  Reason for s Cr fluctuations is the use of diuretics and h/o of CHF. Pre-renal azotemia  OK to continue diuretics (despite changes in Cr) as pt will need diuretics for fluid balance  CKD stage 3 at baseline    H/o of DM and hTN  Was on HD x 2.5 years before transplant     2. Immunosuppression  H/o of living related kidney transplant in 2015 form daughter  Prograf level slightly below therapeutic range  Will increase dose from 4 mg am and 3 mg pm to 4 mg po bid  Meds and doses reviewed with pt and his daughter     3. Cardiac: has diastolic and systolic CHF, mild to moderate fluid gain  Has peripheral edema  Limit salt in diet <2-3 g/day  Limit fluids < 1.5 L/day  Continue lasix 40 mg po bid  Continue metolazone 2.5 mg po twice per week  Agree with cardiology mgmt     Previously cardiac amyloid was suspected, was ruled out by above cardiac test  SPEP and UPEP unremarkable, no monoclonality     4. HTN: BP normal to low  Meds reviewed      Plans and recommendations:  As discussed  above  Total time spent 40 minutes including time needed to review the records, the   patient evaluation, documentation, face-to-face discussion with the patient,   more than 50% of the time was spent on coordination of care and counseling.    Level V visit.  RTC 2 months     Hany Gonsalez MD

## 2022-03-31 ENCOUNTER — TELEPHONE (OUTPATIENT)
Dept: TRANSPLANT | Facility: CLINIC | Age: 67
End: 2022-03-31
Payer: COMMERCIAL

## 2022-03-31 NOTE — TELEPHONE ENCOUNTER
I spoke with pt. States his is taking metolazone on mon/fri as instructed.   He thinks it is working .  LE edema continues to improve.   He has now completed abx for cellulitis.   bp yesterday at neph appt was 100/62 P-101  Wt 283lbs  Reports no problems with his breathing  No coughing  Sleeping in recliner-chronic.  No other c/o at this time.

## 2022-04-11 ENCOUNTER — PATIENT MESSAGE (OUTPATIENT)
Dept: PHARMACY | Facility: CLINIC | Age: 67
End: 2022-04-11
Payer: COMMERCIAL

## 2022-04-14 DIAGNOSIS — I50.42 CHRONIC COMBINED SYSTOLIC AND DIASTOLIC CONGESTIVE HEART FAILURE: ICD-10-CM

## 2022-04-14 DIAGNOSIS — I25.10 CORONARY ARTERY DISEASE INVOLVING NATIVE CORONARY ARTERY OF NATIVE HEART WITHOUT ANGINA PECTORIS: ICD-10-CM

## 2022-04-14 DIAGNOSIS — I87.2 CHRONIC VENOUS INSUFFICIENCY OF LOWER EXTREMITY: ICD-10-CM

## 2022-04-14 RX ORDER — KETOCONAZOLE 200 MG/1
100 TABLET ORAL DAILY
Qty: 15 TABLET | Refills: 9 | OUTPATIENT
Start: 2022-04-14

## 2022-04-14 RX ORDER — SACUBITRIL AND VALSARTAN 97; 103 MG/1; MG/1
1 TABLET, FILM COATED ORAL 2 TIMES DAILY
Qty: 60 TABLET | Refills: 3 | Status: SHIPPED | OUTPATIENT
Start: 2022-04-14 | End: 2022-08-25 | Stop reason: SDUPTHER

## 2022-04-14 NOTE — PROGRESS NOTES
===============================  Date today is 4/25/2022  Mitch Whittaker is a 66 y.o. male  Last visit [facility]: :10/25/2021   Last visit eye dept. Visit date not found    Uncorrected distance visual acuity was 20/20 in the right eye and 20/20 in the left eye.  Tonometry     Tonometry (Applanation, 10:21 AM)       Right Left    Pressure 22 22              Not recorded       Not recorded       Not recorded       Chief Complaint   Patient presents with    Diabetic Eye Exam     Here for 6 mos DM eye exam       Problem List Items Addressed This Visit        Eye/Vision problems    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, with long-term current use of insulin - Primary    Overview     DM since 1996  H/o PRP and Focal OU  OS TRD  H/O Kidney transplant 6/12/15  A1C 8.0 12/19/19           Relevant Orders    Posterior Segment OCT Retina-Both eyes (Completed)    Pseudophakia    Overview     2005 Dr. Merlos             Other Visit Diagnoses     Dry eye syndrome of both eyes              ________________  4/25/2022 today    DM with no active PDR  OCT looks good  Post PRP OU with regressed FVP OU  No PRH OU  No VH OU  PCIOL OU    RTC 1 year  Instructed to call 24/7 for any worsening of vision or symptoms. Check OU daily.   Gave my office and cell phone number.      .      ===============================

## 2022-04-25 ENCOUNTER — OFFICE VISIT (OUTPATIENT)
Dept: OPHTHALMOLOGY | Facility: CLINIC | Age: 67
End: 2022-04-25
Payer: COMMERCIAL

## 2022-04-25 DIAGNOSIS — H04.123 DRY EYE SYNDROME OF BOTH EYES: ICD-10-CM

## 2022-04-25 DIAGNOSIS — Z79.4 TYPE 2 DIABETES MELLITUS WITH STABLE PROLIFERATIVE RETINOPATHY OF BOTH EYES, WITH LONG-TERM CURRENT USE OF INSULIN: Primary | ICD-10-CM

## 2022-04-25 DIAGNOSIS — E11.3553 TYPE 2 DIABETES MELLITUS WITH STABLE PROLIFERATIVE RETINOPATHY OF BOTH EYES, WITH LONG-TERM CURRENT USE OF INSULIN: Primary | ICD-10-CM

## 2022-04-25 DIAGNOSIS — Z96.1 PSEUDOPHAKIA: ICD-10-CM

## 2022-04-25 LAB
LEFT EYE DM RETINOPATHY: POSITIVE
RIGHT EYE DM RETINOPATHY: POSITIVE

## 2022-04-25 PROCEDURE — 3066F PR DOCUMENTATION OF TREATMENT FOR NEPHROPATHY: ICD-10-PCS | Mod: CPTII,S$GLB,, | Performed by: OPHTHALMOLOGY

## 2022-04-25 PROCEDURE — 1159F MED LIST DOCD IN RCRD: CPT | Mod: CPTII,S$GLB,, | Performed by: OPHTHALMOLOGY

## 2022-04-25 PROCEDURE — 4010F PR ACE/ARB THEARPY RXD/TAKEN: ICD-10-PCS | Mod: CPTII,S$GLB,, | Performed by: OPHTHALMOLOGY

## 2022-04-25 PROCEDURE — 3061F NEG MICROALBUMINURIA REV: CPT | Mod: CPTII,S$GLB,, | Performed by: OPHTHALMOLOGY

## 2022-04-25 PROCEDURE — 92134 CPTRZ OPH DX IMG PST SGM RTA: CPT | Mod: S$GLB,,, | Performed by: OPHTHALMOLOGY

## 2022-04-25 PROCEDURE — 4010F ACE/ARB THERAPY RXD/TAKEN: CPT | Mod: CPTII,S$GLB,, | Performed by: OPHTHALMOLOGY

## 2022-04-25 PROCEDURE — 99213 OFFICE O/P EST LOW 20 MIN: CPT | Mod: S$GLB,,, | Performed by: OPHTHALMOLOGY

## 2022-04-25 PROCEDURE — 99999 PR PBB SHADOW E&M-EST. PATIENT-LVL II: CPT | Mod: PBBFAC,,, | Performed by: OPHTHALMOLOGY

## 2022-04-25 PROCEDURE — 99213 PR OFFICE/OUTPT VISIT, EST, LEVL III, 20-29 MIN: ICD-10-PCS | Mod: S$GLB,,, | Performed by: OPHTHALMOLOGY

## 2022-04-25 PROCEDURE — 3061F PR NEG MICROALBUMINURIA RESULT DOCUMENTED/REVIEW: ICD-10-PCS | Mod: CPTII,S$GLB,, | Performed by: OPHTHALMOLOGY

## 2022-04-25 PROCEDURE — 1126F AMNT PAIN NOTED NONE PRSNT: CPT | Mod: CPTII,S$GLB,, | Performed by: OPHTHALMOLOGY

## 2022-04-25 PROCEDURE — 99999 PR PBB SHADOW E&M-EST. PATIENT-LVL II: ICD-10-PCS | Mod: PBBFAC,,, | Performed by: OPHTHALMOLOGY

## 2022-04-25 PROCEDURE — 3066F NEPHROPATHY DOC TX: CPT | Mod: CPTII,S$GLB,, | Performed by: OPHTHALMOLOGY

## 2022-04-25 PROCEDURE — 3044F HG A1C LEVEL LT 7.0%: CPT | Mod: CPTII,S$GLB,, | Performed by: OPHTHALMOLOGY

## 2022-04-25 PROCEDURE — 1159F PR MEDICATION LIST DOCUMENTED IN MEDICAL RECORD: ICD-10-PCS | Mod: CPTII,S$GLB,, | Performed by: OPHTHALMOLOGY

## 2022-04-25 PROCEDURE — 3044F PR MOST RECENT HEMOGLOBIN A1C LEVEL <7.0%: ICD-10-PCS | Mod: CPTII,S$GLB,, | Performed by: OPHTHALMOLOGY

## 2022-04-25 PROCEDURE — 1126F PR PAIN SEVERITY QUANTIFIED, NO PAIN PRESENT: ICD-10-PCS | Mod: CPTII,S$GLB,, | Performed by: OPHTHALMOLOGY

## 2022-04-25 PROCEDURE — 92134 POSTERIOR SEGMENT OCT RETINA (OCULAR COHERENCE TOMOGRAPHY)-BOTH EYES: ICD-10-PCS | Mod: S$GLB,,, | Performed by: OPHTHALMOLOGY

## 2022-04-27 NOTE — PROGRESS NOTES
Subjective:   Patient ID:  Mitch Whittaker is a 66 y.o. male who presents for evaluation of Congestive Heart Failure      HPI      Primary Cardiologist Dr. Muhammad     Cardiac Problems  1. Chronic combined systolic and diastolic heart failure//HFrEF; stage C, FC 3; EF 40% (2/2022)  2. CAD   3. Hypertension   4. LBBB  5. CKD 3  6. S/p living donor renal transplant (6/12/2015)  7. H/o DVT  8. DM 2  9. Anemia   10. Morbid obesity   11. Chronic venous insufficiency   12. MARION  13. Hypotension    He returns today and states he is doing well. Has gained 4 pounds since stopping metolazone. Has been more compliant with dietary and fluid restrictions.   Denies chest pain or anginal equivalents. No shortness of breath, STEELE or palpitations. Has been walking 10 laps around the house every other commercial without any issues. Has some trace leg swelling today in office. Has been sleeping in his recliner without any complaints. No orthopnea, PND or abdominal bloating.   Reports compliance with medications. No signs of abnormal bleeding on Eliquis.     4/28/2022 update    Mitch Whittaker returns today and states he is doing well.   He has been doing well since last visit with me. Taking Metolazone twice a week with good urinary output and response. Trace LLE edema today. Weight has been stable since last visit. Staying active around the house. No signs of abnormal bleeding ASA and Eliquis. Reports compliance with medications and dietary restrictions much better than previously.     BP on lower side today, asymptomatic.   No dizziness, LH, syncope or NS events.     Past Medical History:   Diagnosis Date    Acquired renal cyst of left kidney     Anemia associated with chronic renal failure     CAD (coronary artery disease)     nonobstructive lhc 9/14    CHF (congestive heart failure)     Chronic immunosuppression with Prograf and MMF 06/18/2015    Chronic venous insufficiency of lower extremity     CKD (chronic kidney disease)  stage 3, GFR 30-59 ml/min     Diabetic retinopathy     DM (diabetes mellitus), type 2 with complications     Edema     End stage kidney disease     s/p transplant, doing well    Gallbladder polyp     Heart failure, diastolic, due to HTN     Hemodialysis status     off since transplant    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 RNA NEGATIVE 2015,     History of colon polyps     HPTH (hyperparathyroidism)     Hyperlipidemia     Hypertension associated with stage 3 chronic kidney disease due to type 2 diabetes mellitus     LBBB (left bundle branch block) 2021    Morbid obesity with BMI of 45.0-49.9, adult     PCO (posterior capsular opacification), left 2019    Proteinuria     resolved s/p transplant    S/P kidney transplant     Sleep apnea     Type 2 diabetes, uncontrolled, with retinopathy     Type II diabetes mellitus with renal manifestations        Past Surgical History:   Procedure Laterality Date    CARDIAC CATHETERIZATION      normal coronary    COLONOSCOPY N/A 2018    Procedure: COLONOSCOPY;  Surgeon: Chava Ronquillo MD;  Location[facility];  Service: Endoscopy;  Laterality: N/A;    KIDNEY TRANSPLANT      RETINAL LASER PROCEDURE         Social History     Tobacco Use    Smoking status: Former Smoker     Quit date: 2013     Years since quittin.9    Smokeless tobacco: Former User     Quit date: 2013    Tobacco comment: used marijuana since [id number], stopped after started dialysis   Substance Use Topics    Alcohol use: No     Alcohol/week: 0.0 standard drinks    Drug use: No     Comment:         Family History   Problem Relation Age of Onset    Diabetes Mother     Hypertension Mother     Heart failure Mother     Kidney disease Sister         ESRD    Diabetes Sister     Diabetes Maternal Grandmother     Cancer Neg Hx      Wt Readings from Last 3 Encounters:   22 128.1 kg (282 lb 6.6 oz)    03/30/22 128.4 kg (283 lb 1.1 oz)   03/23/22 127.7 kg (281 lb 8.4 oz)     Temp Readings from Last 3 Encounters:   03/20/22 98.1 °F (36.7 °C) (Oral)   03/20/22 98.4 °F (36.9 °C) (Tympanic)   03/18/22 98.2 °F (36.8 °C) (Temporal)     BP Readings from Last 3 Encounters:   04/28/22 100/62   03/30/22 100/62   03/23/22 109/65     Pulse Readings from Last 3 Encounters:   04/28/22 104   03/30/22 101   03/23/22 (!) 116     Current Outpatient Medications on File Prior to Visit   Medication Sig Dispense Refill    apixaban (ELIQUIS) 5 mg Tab Take 1 tablet (5 mg total) by mouth 2 (two) times daily. 60 tablet 6    aspirin (ECOTRIN) 81 MG EC tablet Take 1 tablet by mouth Daily.       calcitRIOL (ROCALTROL) 0.25 MCG Cap Take 1 capsule (0.25 mcg total) by mouth once daily. 30 capsule 11    ergocalciferol (VITAMIN D2) 50,000 unit Cap Take 1 capsule (50,000 Units total) by mouth every 14 (fourteen) days. 2 capsule 11    famotidine (PEPCID) 20 MG tablet TAKE ONE TABLET BY MOUTH EVERY EVENING 30 tablet 11    fish oil-omega-3 fatty acids 300-1,000 mg capsule Take 1 g by mouth once daily.      furosemide (LASIX) 80 MG tablet Take one-half tablet (40 mg) by mouth 2 (two) times daily. 30 tablet 11    glimepiride (AMARYL) 2 MG tablet Take 1 tablet (2 mg total) by mouth before breakfast. 90 tablet 3    ketoconazole (NIZORAL) 200 mg Tab Take HALF A tablet (100 mg total) by mouth once daily. 15 tablet 9    magnesium oxide (MAG-OX) 400 mg (241.3 mg magnesium) tablet Take 1 tablet (400 mg total) by mouth once daily. 90 tablet 1    metOLazone (ZAROXOLYN) 2.5 MG tablet Take 1 tablet by mouth on Monday and Friday as directed 30 tablet 11    metoprolol succinate (TOPROL-XL) 25 MG 24 hr tablet Take 1 tablet (25 mg total) by mouth once daily. 30 tablet 11    mycophenolate (CELLCEPT) 250 mg Cap Take 3 capsules (750 mg total) by mouth 2 (two) times daily. 180 capsule 11    pen needle, diabetic (BD INSULIN PEN NEEDLE UF SHORT) 31 gauge x  "5/16" Ndle USE TO INJECT INSULIN TWICE A  each 5    potassium chloride SA (K-DUR,KLOR-CON) 20 MEQ tablet Take 2 tablets (40 mEq total) by mouth once daily. 60 tablet 3    predniSONE (DELTASONE) 5 MG tablet Take 1 tablet (5 mg total) by mouth once daily. 30 tablet 11    promethazine-dextromethorphan (PROMETHAZINE-DM) 6.25-15 mg/5 mL Syrp Take by mouth.      rosuvastatin (CRESTOR) 40 MG Tab Take 1 tablet (40 mg total) by mouth once daily in the evening. 90 tablet 3    sacubitriL-valsartan (ENTRESTO)  mg per tablet Take 1 tablet by mouth 2 (two) times daily. 60 tablet 3    tacrolimus (PROGRAF) 1 MG Cap Take 4 capsules (4 mg total) by mouth every 12 (twelve) hours. 240 capsule 11    tamsulosin (FLOMAX) 0.4 mg Cap Take 1 capsule (0.4 mg total) by mouth once daily. 30 capsule 11    BD ULTRA-FINE MINI PEN NEEDLE 31 gauge x 3/16" Ndle Use to inject insulin as needed up to 4 times daily (Patient taking differently: Use to inject insulin as needed up to 4 times daily) 100 each 3    blood sugar diagnostic Strp Use to test four times per day (Patient taking differently: Use to test four times per day) 150 strip 11    insulin (LANTUS SOLOSTAR U-100 INSULIN) glargine 100 units/mL (3mL) SubQ pen Inject 35 Units into the skin 2 (two) times daily. 30 mL 11    insulin aspart U-100 (NOVOLOG FLEXPEN U-100 INSULIN) 100 unit/mL (3 mL) InPn pen Inject 25 Units into the skin 3 (three) times daily with meals. (Patient taking differently: Inject 20-30 Units into the skin 2 (two) times daily with meals.) 30 mL 11    lancets 33 gauge Misc 1 Stick by Misc.(Non-Drug; Combo Route) route as directed. Contour lancets. Use to check BG 2-3 times daily. 150 each 11    multivitamin (THERAGRAN) tablet Take 1 tablet by mouth once daily.      mupirocin (BACTROBAN) 2 % ointment Apply topically 3 (three) times daily. Use as directed on the leg wound. 22 g 0     Current Facility-Administered Medications on File Prior to Visit " "  Medication Dose Route Frequency Provider Last Rate Last Admin    acetaminophen tablet 650 mg  650 mg Oral Once PRN Sal Lopez MD           Review of Systems   Constitutional: Positive for malaise/fatigue.   HENT: Negative for hearing loss and hoarse voice.    Eyes: Negative for blurred vision and visual disturbance.   Cardiovascular: Positive for leg swelling. Negative for chest pain, claudication, dyspnea on exertion, irregular heartbeat, near-syncope, orthopnea, palpitations, paroxysmal nocturnal dyspnea and syncope.   Respiratory: Negative for cough, hemoptysis, shortness of breath, sleep disturbances due to breathing, snoring and wheezing.    Endocrine: Negative for cold intolerance and heat intolerance.   Hematologic/Lymphatic: Does not bruise/bleed easily.   Skin: Negative for color change, dry skin and nail changes.   Musculoskeletal: Positive for arthritis. Negative for back pain, joint pain and myalgias.   Gastrointestinal: Negative for bloating, abdominal pain, constipation, nausea and vomiting.   Genitourinary: Negative for dysuria, flank pain, hematuria and hesitancy.   Neurological: Negative for headaches, light-headedness, loss of balance, numbness, paresthesias and weakness.   Psychiatric/Behavioral: Negative for altered mental status.   Allergic/Immunologic: Negative for environmental allergies.     /62   Pulse 104   Ht 5' 7" (1.702 m)   Wt 128.1 kg (282 lb 6.6 oz)   SpO2 97%   BMI 44.23 kg/m²     Objective:   Physical Exam  Vitals and nursing note reviewed.   Constitutional:       General: He is not in acute distress.     Appearance: Normal appearance. He is well-developed. He is obese. He is not ill-appearing.   HENT:      Head: Normocephalic and atraumatic.      Nose: Nose normal.      Mouth/Throat:      Mouth: Mucous membranes are moist.   Eyes:      Pupils: Pupils are equal, round, and reactive to light.   Neck:      Thyroid: No thyromegaly.      Vascular: No JVD.      " Trachea: No tracheal deviation.   Cardiovascular:      Rate and Rhythm: Regular rhythm. Tachycardia present.      Chest Wall: PMI is not displaced.      Pulses: Intact distal pulses.           Radial pulses are 2+ on the right side and 2+ on the left side.        Dorsalis pedis pulses are 2+ on the right side and 2+ on the left side.      Heart sounds: S1 normal and S2 normal. Heart sounds not distant. No murmur heard.  Pulmonary:      Effort: Pulmonary effort is normal. No respiratory distress.      Breath sounds: Normal breath sounds and air entry. No decreased breath sounds, wheezing or rhonchi.   Abdominal:      General: Bowel sounds are normal. There is no distension.      Palpations: Abdomen is soft.      Tenderness: There is no abdominal tenderness.   Musculoskeletal:         General: Normal range of motion.      Cervical back: Full passive range of motion without pain, normal range of motion and neck supple.      Right ankle: Swelling present.      Left ankle: Swelling present.   Skin:     General: Skin is warm and dry.      Capillary Refill: Capillary refill takes less than 2 seconds.      Nails: There is no clubbing.   Neurological:      General: No focal deficit present.      Mental Status: He is alert and oriented to person, place, and time.   Psychiatric:         Mood and Affect: Mood normal.         Speech: Speech normal.         Behavior: Behavior normal.         Thought Content: Thought content normal.         Judgment: Judgment normal.         Lab Results   Component Value Date    CHOL 185 03/11/2022    CHOL 188 10/18/2021    CHOL 178 10/06/2021     Lab Results   Component Value Date    HDL 49 03/11/2022    HDL 59 10/18/2021    HDL 58 10/06/2021     Lab Results   Component Value Date    LDLCALC 111.0 03/11/2022    LDLCALC 106.6 10/18/2021    LDLCALC 106.2 10/06/2021     Lab Results   Component Value Date    TRIG 125 03/11/2022    TRIG 112 10/18/2021    TRIG 69 10/06/2021     Lab Results   Component  Value Date    CHOLHDL 26.5 03/11/2022    CHOLHDL 31.4 10/18/2021    CHOLHDL 32.6 10/06/2021       Chemistry        Component Value Date/Time     03/23/2022 0821    K 4.3 03/23/2022 0821     03/23/2022 0821    CO2 27 03/23/2022 0821    BUN 31 (H) 03/23/2022 0821    CREATININE 2.3 (H) 03/23/2022 0821     03/23/2022 0821        Component Value Date/Time    CALCIUM 9.3 03/23/2022 0821    ALKPHOS 52 (L) 03/20/2022 1117    AST 15 03/20/2022 1117    ALT 14 03/20/2022 1117    BILITOT 0.8 03/20/2022 1117    ESTGFRAFRICA 32.5 (A) 03/23/2022 0821    EGFRNONAA 28.1 (A) 03/23/2022 0821          Lab Results   Component Value Date    TSH 0.999 03/11/2022     Lab Results   Component Value Date    INR 1.0 06/18/2015    INR 1.0 06/12/2015    INR 0.9 05/25/2015     Lab Results   Component Value Date    WBC 6.86 03/23/2022    HGB 10.0 (L) 03/23/2022    HCT 33.1 (L) 03/23/2022    MCV 87 03/23/2022     03/23/2022     Results for orders placed during the hospital encounter of 02/18/22    Echo    Interpretation Summary  · Concentric hypertrophy and mildly decreased systolic function.  · Mild tricuspid regurgitation.  · Mild left atrial enlargement.  · The estimated ejection fraction is 40%.  · Left ventricular diastolic dysfunction.  · There is abnormal septal wall motion consistent with left bundle branch block.  · With normal right ventricular systolic function.  · Normal central venous pressure (3 mmHg).  · The estimated PA systolic pressure is 27 mmHg.     Assessment:      1. Chronic combined systolic and diastolic congestive heart failure    2. LBBB (left bundle branch block)    3. 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    4. Hypertension associated with stage 3 chronic kidney disease due to type 2 diabetes mellitus    5. Coronary artery disease of native artery of native heart with stable angina pectoris    6. Stage 3b  chronic kidney disease    7. Class 3 severe obesity due to excess calories with serious comorbidity and body mass index (BMI) of 40.0 to 44.9 in adult    8. DM (diabetes mellitus), type 2 with complications        Plan:   Ok to hold Aspirin and Eliquis until after colonoscopy    Continue metolazone 2.5 mg on Tuesday and Fridays for now  Continue all other CHF medical therapy for now  Dash diet, 2 gm sodium restriction  Encourage daily walking  Weight loss encouraged  Monitor BP/HR at home  CHF nurse phone review in 1 week  RTC in 4-6 weeks for CHF with BMP, BNP    Nicole May, FNP-C [facility]/Heart Failure    GDMT:  Entresto 97/103 BID  Toprol XL 25 mg daily  Lasix 40 mg BID  No SGLT-2 due to renal dysfunction

## 2022-04-28 ENCOUNTER — OFFICE VISIT (OUTPATIENT)
Dept: CARDIOLOGY | Facility: CLINIC | Age: 67
End: 2022-04-28
Payer: COMMERCIAL

## 2022-04-28 VITALS
BODY MASS INDEX: 44.33 KG/M2 | WEIGHT: 282.44 LBS | HEIGHT: 67 IN | OXYGEN SATURATION: 97 % | SYSTOLIC BLOOD PRESSURE: 100 MMHG | HEART RATE: 104 BPM | DIASTOLIC BLOOD PRESSURE: 62 MMHG

## 2022-04-28 DIAGNOSIS — E11.8 DM (DIABETES MELLITUS), TYPE 2 WITH COMPLICATIONS: ICD-10-CM

## 2022-04-28 DIAGNOSIS — I42.8 INFILTRATIVE CARDIOMYOPATHY: ICD-10-CM

## 2022-04-28 DIAGNOSIS — I12.9 HYPERTENSION ASSOCIATED WITH STAGE 3 CHRONIC KIDNEY DISEASE DUE TO TYPE 2 DIABETES MELLITUS: ICD-10-CM

## 2022-04-28 DIAGNOSIS — I44.7 LBBB (LEFT BUNDLE BRANCH BLOCK): ICD-10-CM

## 2022-04-28 DIAGNOSIS — E66.01 CLASS 3 SEVERE OBESITY DUE TO EXCESS CALORIES WITH SERIOUS COMORBIDITY AND BODY MASS INDEX (BMI) OF 40.0 TO 44.9 IN ADULT: ICD-10-CM

## 2022-04-28 DIAGNOSIS — N18.30 HYPERTENSION ASSOCIATED WITH STAGE 3 CHRONIC KIDNEY DISEASE DUE TO TYPE 2 DIABETES MELLITUS: ICD-10-CM

## 2022-04-28 DIAGNOSIS — I50.42 CHRONIC COMBINED SYSTOLIC AND DIASTOLIC CONGESTIVE HEART FAILURE: Primary | ICD-10-CM

## 2022-04-28 DIAGNOSIS — I25.118 CORONARY ARTERY DISEASE OF NATIVE ARTERY OF NATIVE HEART WITH STABLE ANGINA PECTORIS: ICD-10-CM

## 2022-04-28 DIAGNOSIS — N18.32 STAGE 3B CHRONIC KIDNEY DISEASE: ICD-10-CM

## 2022-04-28 DIAGNOSIS — E11.22 HYPERTENSION ASSOCIATED WITH STAGE 3 CHRONIC KIDNEY DISEASE DUE TO TYPE 2 DIABETES MELLITUS: ICD-10-CM

## 2022-04-28 PROCEDURE — 3008F PR BODY MASS INDEX (BMI) DOCUMENTED: ICD-10-PCS | Mod: CPTII,S$GLB,, | Performed by: NURSE PRACTITIONER

## 2022-04-28 PROCEDURE — 1159F MED LIST DOCD IN RCRD: CPT | Mod: CPTII,S$GLB,, | Performed by: NURSE PRACTITIONER

## 2022-04-28 PROCEDURE — 3061F PR NEG MICROALBUMINURIA RESULT DOCUMENTED/REVIEW: ICD-10-PCS | Mod: CPTII,S$GLB,, | Performed by: NURSE PRACTITIONER

## 2022-04-28 PROCEDURE — 3288F PR FALLS RISK ASSESSMENT DOCUMENTED: ICD-10-PCS | Mod: CPTII,S$GLB,, | Performed by: NURSE PRACTITIONER

## 2022-04-28 PROCEDURE — 4010F ACE/ARB THERAPY RXD/TAKEN: CPT | Mod: CPTII,S$GLB,, | Performed by: NURSE PRACTITIONER

## 2022-04-28 PROCEDURE — 4010F PR ACE/ARB THEARPY RXD/TAKEN: ICD-10-PCS | Mod: CPTII,S$GLB,, | Performed by: NURSE PRACTITIONER

## 2022-04-28 PROCEDURE — 1126F PR PAIN SEVERITY QUANTIFIED, NO PAIN PRESENT: ICD-10-PCS | Mod: CPTII,S$GLB,, | Performed by: NURSE PRACTITIONER

## 2022-04-28 PROCEDURE — 99214 OFFICE O/P EST MOD 30 MIN: CPT | Mod: S$GLB,,, | Performed by: NURSE PRACTITIONER

## 2022-04-28 PROCEDURE — 1126F AMNT PAIN NOTED NONE PRSNT: CPT | Mod: CPTII,S$GLB,, | Performed by: NURSE PRACTITIONER

## 2022-04-28 PROCEDURE — 1100F PTFALLS ASSESS-DOCD GE2>/YR: CPT | Mod: CPTII,S$GLB,, | Performed by: NURSE PRACTITIONER

## 2022-04-28 PROCEDURE — 3288F FALL RISK ASSESSMENT DOCD: CPT | Mod: CPTII,S$GLB,, | Performed by: NURSE PRACTITIONER

## 2022-04-28 PROCEDURE — 99214 PR OFFICE/OUTPT VISIT, EST, LEVL IV, 30-39 MIN: ICD-10-PCS | Mod: S$GLB,,, | Performed by: NURSE PRACTITIONER

## 2022-04-28 PROCEDURE — 1159F PR MEDICATION LIST DOCUMENTED IN MEDICAL RECORD: ICD-10-PCS | Mod: CPTII,S$GLB,, | Performed by: NURSE PRACTITIONER

## 2022-04-28 PROCEDURE — 99999 PR PBB SHADOW E&M-EST. PATIENT-LVL III: ICD-10-PCS | Mod: PBBFAC,,, | Performed by: NURSE PRACTITIONER

## 2022-04-28 PROCEDURE — 3078F PR MOST RECENT DIASTOLIC BLOOD PRESSURE < 80 MM HG: ICD-10-PCS | Mod: CPTII,S$GLB,, | Performed by: NURSE PRACTITIONER

## 2022-04-28 PROCEDURE — 3008F BODY MASS INDEX DOCD: CPT | Mod: CPTII,S$GLB,, | Performed by: NURSE PRACTITIONER

## 2022-04-28 PROCEDURE — 3044F PR MOST RECENT HEMOGLOBIN A1C LEVEL <7.0%: ICD-10-PCS | Mod: CPTII,S$GLB,, | Performed by: NURSE PRACTITIONER

## 2022-04-28 PROCEDURE — 99999 PR PBB SHADOW E&M-EST. PATIENT-LVL III: CPT | Mod: PBBFAC,,, | Performed by: NURSE PRACTITIONER

## 2022-04-28 PROCEDURE — 3066F NEPHROPATHY DOC TX: CPT | Mod: CPTII,S$GLB,, | Performed by: NURSE PRACTITIONER

## 2022-04-28 PROCEDURE — 3044F HG A1C LEVEL LT 7.0%: CPT | Mod: CPTII,S$GLB,, | Performed by: NURSE PRACTITIONER

## 2022-04-28 PROCEDURE — 3072F LOW RISK FOR RETINOPATHY: CPT | Mod: CPTII,S$GLB,, | Performed by: NURSE PRACTITIONER

## 2022-04-28 PROCEDURE — 3061F NEG MICROALBUMINURIA REV: CPT | Mod: CPTII,S$GLB,, | Performed by: NURSE PRACTITIONER

## 2022-04-28 PROCEDURE — 1100F PR PT FALLS ASSESS DOC 2+ FALLS/FALL W/INJURY/YR: ICD-10-PCS | Mod: CPTII,S$GLB,, | Performed by: NURSE PRACTITIONER

## 2022-04-28 PROCEDURE — 3072F PR LOW RISK FOR RETINOPATHY: ICD-10-PCS | Mod: CPTII,S$GLB,, | Performed by: NURSE PRACTITIONER

## 2022-04-28 PROCEDURE — 3066F PR DOCUMENTATION OF TREATMENT FOR NEPHROPATHY: ICD-10-PCS | Mod: CPTII,S$GLB,, | Performed by: NURSE PRACTITIONER

## 2022-04-28 PROCEDURE — 3078F DIAST BP <80 MM HG: CPT | Mod: CPTII,S$GLB,, | Performed by: NURSE PRACTITIONER

## 2022-04-28 PROCEDURE — 3074F SYST BP LT 130 MM HG: CPT | Mod: CPTII,S$GLB,, | Performed by: NURSE PRACTITIONER

## 2022-04-28 PROCEDURE — 3074F PR MOST RECENT SYSTOLIC BLOOD PRESSURE < 130 MM HG: ICD-10-PCS | Mod: CPTII,S$GLB,, | Performed by: NURSE PRACTITIONER

## 2022-05-02 ENCOUNTER — ANESTHESIA EVENT (OUTPATIENT)
Dept: ENDOSCOPY | Facility: HOSPITAL | Age: 67
End: 2022-05-02
Payer: COMMERCIAL

## 2022-05-02 ENCOUNTER — HOSPITAL ENCOUNTER (OUTPATIENT)
Facility: HOSPITAL | Age: 67
Discharge: HOME | End: 2022-05-02
Attending: INTERNAL MEDICINE | Admitting: INTERNAL MEDICINE
Payer: COMMERCIAL

## 2022-05-02 ENCOUNTER — ANESTHESIA (OUTPATIENT)
Dept: ENDOSCOPY | Facility: HOSPITAL | Age: 67
End: 2022-05-02
Payer: COMMERCIAL

## 2022-05-02 VITALS
BODY MASS INDEX: 44.73 KG/M2 | RESPIRATION RATE: 19 BRPM | DIASTOLIC BLOOD PRESSURE: 67 MMHG | OXYGEN SATURATION: 96 % | WEIGHT: 285 LBS | HEIGHT: 67 IN | HEART RATE: 93 BPM | TEMPERATURE: 98 F | SYSTOLIC BLOOD PRESSURE: 120 MMHG

## 2022-05-02 DIAGNOSIS — Z86.010 HISTORY OF COLON POLYPS: Primary | ICD-10-CM

## 2022-05-02 LAB — GLUCOSE SERPL-MCNC: 180 MG/DL (ref 70–110)

## 2022-05-02 PROCEDURE — 88305 TISSUE EXAM BY PATHOLOGIST: CPT | Mod: 26,,, | Performed by: STUDENT IN AN ORGANIZED HEALTH CARE EDUCATION/TRAINING PROGRAM

## 2022-05-02 PROCEDURE — 45385 PR COLONOSCOPY,REMV LESN,SNARE: ICD-10-PCS | Mod: 33,,, | Performed by: INTERNAL MEDICINE

## 2022-05-02 PROCEDURE — 25000003 PHARM REV CODE 250: Performed by: NURSE ANESTHETIST, CERTIFIED REGISTERED

## 2022-05-02 PROCEDURE — 37000009 HC ANESTHESIA EA ADD 15 MINS: Performed by: INTERNAL MEDICINE

## 2022-05-02 PROCEDURE — 45385 COLONOSCOPY W/LESION REMOVAL: CPT | Mod: 33,,, | Performed by: INTERNAL MEDICINE

## 2022-05-02 PROCEDURE — 45380 PR COLONOSCOPY,BIOPSY: ICD-10-PCS | Mod: 59,,, | Performed by: INTERNAL MEDICINE

## 2022-05-02 PROCEDURE — 37000008 HC ANESTHESIA 1ST 15 MINUTES: Performed by: INTERNAL MEDICINE

## 2022-05-02 PROCEDURE — 45380 COLONOSCOPY AND BIOPSY: CPT | Mod: 59,,, | Performed by: INTERNAL MEDICINE

## 2022-05-02 PROCEDURE — 82962 GLUCOSE BLOOD TEST: CPT | Performed by: INTERNAL MEDICINE

## 2022-05-02 PROCEDURE — 27201089 HC SNARE, DISP (ANY): Performed by: INTERNAL MEDICINE

## 2022-05-02 PROCEDURE — 88305 TISSUE EXAM BY PATHOLOGIST: ICD-10-PCS | Mod: 26,,, | Performed by: STUDENT IN AN ORGANIZED HEALTH CARE EDUCATION/TRAINING PROGRAM

## 2022-05-02 PROCEDURE — 63600175 PHARM REV CODE 636 W HCPCS: Performed by: NURSE ANESTHETIST, CERTIFIED REGISTERED

## 2022-05-02 PROCEDURE — 27201012 HC FORCEPS, HOT/COLD, DISP: Performed by: INTERNAL MEDICINE

## 2022-05-02 PROCEDURE — 88305 TISSUE EXAM BY PATHOLOGIST: CPT | Mod: 59 | Performed by: STUDENT IN AN ORGANIZED HEALTH CARE EDUCATION/TRAINING PROGRAM

## 2022-05-02 PROCEDURE — 45380 COLONOSCOPY AND BIOPSY: CPT | Performed by: INTERNAL MEDICINE

## 2022-05-02 PROCEDURE — 45385 COLONOSCOPY W/LESION REMOVAL: CPT | Performed by: INTERNAL MEDICINE

## 2022-05-02 RX ORDER — PROPOFOL 10 MG/ML
VIAL (ML) INTRAVENOUS
Status: DISCONTINUED | OUTPATIENT
Start: 2022-05-02 | End: 2022-05-02

## 2022-05-02 RX ORDER — LIDOCAINE HYDROCHLORIDE 10 MG/ML
INJECTION, SOLUTION EPIDURAL; INFILTRATION; INTRACAUDAL; PERINEURAL
Status: DISCONTINUED | OUTPATIENT
Start: 2022-05-02 | End: 2022-05-02

## 2022-05-02 RX ORDER — SODIUM CHLORIDE, SODIUM LACTATE, POTASSIUM CHLORIDE, CALCIUM CHLORIDE 600; 310; 30; 20 MG/100ML; MG/100ML; MG/100ML; MG/100ML
INJECTION, SOLUTION INTRAVENOUS CONTINUOUS
Status: DISCONTINUED | OUTPATIENT
Start: 2022-05-02 | End: 2022-05-02 | Stop reason: HOSPADM

## 2022-05-02 RX ADMIN — PROPOFOL 50 MG: 10 INJECTION, EMULSION INTRAVENOUS at 09:05

## 2022-05-02 RX ADMIN — LIDOCAINE HYDROCHLORIDE 50 MG: 10 INJECTION, SOLUTION EPIDURAL; INFILTRATION; INTRACAUDAL; PERINEURAL at 09:05

## 2022-05-02 RX ADMIN — SODIUM CHLORIDE, SODIUM LACTATE, POTASSIUM CHLORIDE, AND CALCIUM CHLORIDE: .6; .31; .03; .02 INJECTION, SOLUTION INTRAVENOUS at 09:05

## 2022-05-02 RX ADMIN — PROPOFOL 100 MG: 10 INJECTION, EMULSION INTRAVENOUS at 09:05

## 2022-05-02 NOTE — ANESTHESIA POSTPROCEDURE EVALUATION
Anesthesia Post Evaluation    Patient: Mitch Whittaker    Procedure(s) Performed: Procedure(s) (LRB):  COLONOSCOPY (N/A)    Final Anesthesia Type: MAC      Patient location during evaluation: GI PACU  Patient participation: Yes- Able to Participate  Level of consciousness: awake and alert  Post-procedure vital signs: reviewed and stable  Pain management: adequate  Airway patency: patent    PONV status at discharge: No PONV  Anesthetic complications: no      Cardiovascular status: blood pressure returned to baseline  Respiratory status: unassisted and spontaneous ventilation  Hydration status: euvolemic  Follow-up not needed.          Vitals Value Taken Time   /67 05/02/22 1035   Temp  05/02/22 1045   Pulse 93 05/02/22 1035   Resp 19 05/02/22 1035   SpO2 96 % 05/02/22 1035         Event Time   Out of Recovery 10:42:54         Pain/Lakeisha Score: Lakeisha Score: 10 (5/2/2022 10:35 AM)

## 2022-05-02 NOTE — ANESTHESIA PREPROCEDURE EVALUATION
Patient Active Problem List   Diagnosis    Anemia associated with chronic renal failure    Obesity    Acquired renal cyst of left kidney    Nephrolithiasis    Sleep apnea    Pseudophakia    CAD (coronary artery disease)    Living-related donor kidney transplant (daughter) - 6/12/15    Diabetic peripheral neuropathy    Chronic immunosuppression with Prograf, MMF and prednisone    Secondary hyperparathyroidism of renal origin    CKD (chronic kidney disease) stage 3, GFR 30-59 ml/min    Type II diabetes mellitus with renal manifestations    Hypertension associated with stage 3 chronic kidney disease due to type 2 diabetes mellitus    DM (diabetes mellitus), type 2 with complications    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, with long-term current use of insulin    Epiretinal membrane (ERM) of both eyes    History of colon polyps    Benign prostatic hyperplasia without lower urinary tract symptoms    PCO (posterior capsular opacification), left    Chronic combined systolic and diastolic congestive heart failure    Deep vein thrombosis (DVT) of lower extremity    Chronic venous insufficiency of lower extremity    Morbid obesity with BMI of 40.0-44.9, adult    Infiltrative cardiomyopathy--suspected and if amyloidosis ruled out this diagnosis should be removed from his medical record.    LBBB (left bundle branch block)    Lambda light chain disease    Cellulitis of extremity                                                                                                                 05/02/2022  Mitch Whittaker is a 66 y.o., male.    Pre-op Assessment    I have reviewed the Patient Summary Reports.    I have reviewed the Nursing Notes. I have reviewed the NPO Status.   I have reviewed the Medications.     Review of Systems  Anesthesia Hx:  No problems with previous Anesthesia    Social:  Former Smoker    Hematology/Oncology:         -- Anemia:    Cardiovascular:   Hypertension CAD  Dysrhythmias  CHF hyperlipidemia ECG has been reviewed. Sinus tachycardia   Right axis deviation   Nonspecific intraventricular block   Nonspecific ST abnormality   When compared with ECG of 25-FEB-2022 10:39,   The axis Shifted right   Confirmed by JOSE FRANCISCO LAKE MD ([id number]) on 3/20/2022 9:57:37 PM     ECHO  CONCLUSIONS     1 - Concentric hypertrophy.     2 - Wall motion abnormalities.     3 - Mildly depressed left ventricular systolic function (EF 45-50%).     4 - Impaired LV relaxation, normal LAP (grade 1 diastolic dysfunction).     5 - Normal right ventricular systolic function .     6 - Trivial pericardial effusion.     Pulmonary:   Sleep Apnea    Renal/:   Chronic Renal Disease (s/p renal transplant), ESRD, Dialysis    Hepatic/GI:   Hepatitis, C Obesity; history of colon polyps   Endocrine:   Diabetes, well controlled, type 2  Morbid Obesity / BMI > 40      Physical Exam  General:  Well nourished      Airway/Jaw/Neck:  Airway Findings: Mouth Opening: Normal   Mallampati: III TM Distance: Normal   Neck ROM: Normal ROM       Dental:  Intact     Chest/Lungs:  Chest/Lungs Findings: Clear to auscultation, Normal Respiratory Rate      Heart/Vascular:  Heart Findings: Rate: Normal  Rhythm: Regular Rhythm             Anesthesia Plan  Type of Anesthesia, risks & benefits discussed:  Anesthesia Type:  MAC    Patient's Preference:   Plan Factors:          Intra-op Monitoring Plan: Standard ASA Monitors  Intra-op Monitoring Plan Comments:   Post Op Pain Control Plan: multimodal analgesia  Post Op Pain Control Plan Comments:     Induction:   IV  Beta Blocker:  Patient is on a Beta-Blocker and has received one dose within the past 24 hours (No further documentation required).       Informed Consent: Informed consent signed with the Patient and all parties understand the risks and agree with anesthesia plan.  All questions answered.    ASA Score: 3     Day of Surgery Review of  History & Physical: I have interviewed and examined the patient. I have reviewed the patient's H&P dated:  There are no significant changes.  H&P Update referred to the surgeon/provider.          Ready For Surgery From Anesthesia Perspective.           Physical Exam  General: Well nourished    Airway:  Mallampati: III   Mouth Opening: Normal  TM Distance: Normal  Neck ROM: Normal ROM    Dental:  Intact    Chest/Lungs:  Clear to auscultation, Normal Respiratory Rate    Heart:  Rate: Normal  Rhythm: Regular Rhythm          Anesthesia Plan  Type of Anesthesia, risks & benefits discussed:    Anesthesia Type: MAC  Intra-op Monitoring Plan: Standard ASA Monitors  Post Op Pain Control Plan: multimodal analgesia  Induction:  IV  Informed Consent: Informed consent signed with the Patient and all parties understand the risks and agree with anesthesia plan.  All questions answered.   ASA Score: 3  Day of Surgery Review of History & Physical: H&P Update referred to the surgeon/provider.I have interviewed and examined the patient. I have reviewed the patient's H&P dated:     Ready For Surgery From Anesthesia Perspective.       .

## 2022-05-02 NOTE — H&P
PRE PROCEDURE H&P    Patient Name: Mitch Whittaker  MRN: [id number]  : 1955  Date of Procedure:  2022  Referring Physician: Cornelio Ham MD  Primary Physician: Cornelio Ham MD  Procedure Physician: Alix Puente MD       Planned Procedure: Colonoscopy  Diagnosis: previous adenomatous polyp     Chief Complaint: Same as above    HPI: Patient is an 66 y.o. male is here for the above. He is s/p renal transplant 7 years ago. Last colonoscopy in 2018. Sessile serrated adenomas removed in the descending and sigmoid colon. Takes Eliquis for a hx of DVT. Stopped 7 days ago.     Last colonoscopy: 2018  Family history: none  Anticoagulation: Eliquis, stopped 7 days ago.    Past Medical History:   Past Medical History:   Diagnosis Date    Acquired renal cyst of left kidney     Anemia associated with chronic renal failure     CAD (coronary artery disease)     nonobstructive [facility]     CHF (congestive heart failure)     Chronic immunosuppression with Prograf and MMF 2015    Chronic venous insufficiency of lower extremity     CKD (chronic kidney disease) stage 3, GFR 30-59 ml/min     Diabetic retinopathy     DM (diabetes mellitus), type 2 with complications 1994    Edema     End stage kidney disease     s/p transplant, doing well    Gallbladder polyp     Heart failure, diastolic, due to HTN     Hemodialysis status     off since transplant    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 RNA NEGATIVE 2021    History of colon polyps     HPTH (hyperparathyroidism)     Hyperlipidemia     Hypertension associated with stage 3 chronic kidney disease due to type 2 diabetes mellitus     LBBB (left bundle branch block) 2021    Morbid obesity with BMI of 45.0-49.9, adult     PCO (posterior capsular opacification), left 2019    Proteinuria     resolved s/p transplant    S/P kidney transplant     Sleep apnea     Type 2 diabetes, uncontrolled,  with retinopathy     Type II diabetes mellitus with renal manifestations         Past Surgical History:  Past Surgical History:   Procedure Laterality Date    CARDIAC CATHETERIZATION  2008    normal coronary    COLONOSCOPY N/A 4/5/2018    Procedure: COLONOSCOPY;  Surgeon: Chava Ronquillo MD;  Location: [facility];  Service: Endoscopy;  Laterality: N/A;    KIDNEY TRANSPLANT      RETINAL LASER PROCEDURE          Home Medications:  Prior to Admission medications    Medication Sig Start Date End Date Taking? Authorizing Provider   calcitRIOL (ROCALTROL) 0.25 MCG Cap Take 1 capsule (0.25 mcg total) by mouth once daily. 6/7/21 6/7/22 Yes Marci Pan MD   ergocalciferol (VITAMIN D2) 50,000 unit Cap Take 1 capsule (50,000 Units total) by mouth every 14 (fourteen) days. 8/19/21 8/19/22 Yes Marci Pan MD   famotidine (PEPCID) 20 MG tablet TAKE ONE TABLET BY MOUTH EVERY EVENING 4/26/19  Yes Cornelio Ham MD   fish oil-omega-3 fatty acids 300-1,000 mg capsule Take 1 g by mouth once daily.   Yes Historical Provider   furosemide (LASIX) 80 MG tablet Take one-half tablet (40 mg) by mouth 2 (two) times daily. 8/31/21  Yes Hany Gonsalez MD   glimepiride (AMARYL) 2 MG tablet Take 1 tablet (2 mg total) by mouth before breakfast. 10/19/21 10/19/22 Yes Cornelio Ham MD   insulin (LANTUS SOLOSTAR U-100 INSULIN) glargine 100 units/mL (3mL) SubQ pen Inject 35 Units into the skin 2 (two) times daily. 12/7/21 12/7/22 Yes Cornelio Ham MD   insulin aspart U-100 (NOVOLOG FLEXPEN U-100 INSULIN) 100 unit/mL (3 mL) InPn pen Inject 25 Units into the skin 3 (three) times daily with meals.  Patient taking differently: Inject 20-30 Units into the skin 2 (two) times daily with meals. 12/4/21 12/4/22 Yes Cornelio Ham MD   ketoconazole (NIZORAL) 200 mg Tab Take HALF A tablet (100 mg total) by mouth once daily. 4/20/22  Yes Hany Gonsalez MD   magnesium oxide (MAG-OX) 400 mg (241.3 mg magnesium) tablet Take 1 tablet (400 mg  "total) by mouth once daily. 1/12/22  Yes Micah Muhammad MD   metOLazone (ZAROXOLYN) 2.5 MG tablet Take 1 tablet by mouth on Monday and Friday as directed 3/17/22 3/17/23 Yes PETER Melara   metoprolol succinate (TOPROL-XL) 25 MG 24 hr tablet Take 1 tablet (25 mg total) by mouth once daily. 2/19/22  Yes Cornelio Ham MD   multivitamin (THERAGRAN) tablet Take 1 tablet by mouth once daily.   Yes Historical Provider   mycophenolate (CELLCEPT) 250 mg Cap Take 3 capsules (750 mg total) by mouth 2 (two) times daily. 6/11/21 6/11/22 Yes Raeann Glaser NP   potassium chloride SA (K-DUR,KLOR-CON) 20 MEQ tablet Take 2 tablets (40 mEq total) by mouth once daily. 3/3/22  Yes Corina De La Torre NP   predniSONE (DELTASONE) 5 MG tablet Take 1 tablet (5 mg total) by mouth once daily. 6/11/21 6/11/22 Yes Raeann Glaser NP   promethazine-dextromethorphan (PROMETHAZINE-DM) 6.25-15 mg/5 mL Syrp Take by mouth.   Yes Historical Provider   rosuvastatin (CRESTOR) 40 MG Tab Take 1 tablet (40 mg total) by mouth once daily in the evening. 12/31/21  Yes Cornelio Ham MD   sacubitriL-valsartan (ENTRESTO)  mg per tablet Take 1 tablet by mouth 2 (two) times daily. 4/14/22  Yes Micah Muhammad MD   tacrolimus (PROGRAF) 1 MG Cap Take 4 capsules (4 mg total) by mouth every 12 (twelve) hours. 3/30/22 3/30/23 Yes Hany Gonsalez MD   tamsulosin (FLOMAX) 0.4 mg Cap Take 1 capsule (0.4 mg total) by mouth once daily. 2/18/22  Yes Cornelio Ham MD   apixaban (ELIQUIS) 5 mg Tab Take 1 tablet (5 mg total) by mouth 2 (two) times daily. 4/14/22   Micah Muhammad MD   aspirin (ECOTRIN) 81 MG EC tablet Take 1 tablet by mouth Daily.     Historical Provider   BD ULTRA-FINE MINI PEN NEEDLE 31 gauge x 3/16" Ndle Use to inject insulin as needed up to 4 times daily  Patient taking differently: Use to inject insulin as needed up to 4 times daily 3/17/21   Cornelio Ham MD   blood sugar diagnostic Strp Use to test four times per day  Patient taking " "differently: Use to test four times per day 10/25/18   Cornelio Ham MD   lancets 33 gauge Misc 1 Stick by Misc.(Non-Drug; Combo Route) route as directed. Contour lancets. Use to check BG 2-3 times daily. 13   Bina Hinkle, FNP-C, CDE   mupirocin (BACTROBAN) 2 % ointment Apply topically 3 (three) times daily. Use as directed on the leg wound. 3/30/22   Hany Gonsalez MD   pen needle, diabetic (BD INSULIN PEN NEEDLE UF SHORT) 31 gauge x " Ndle USE TO INJECT INSULIN TWICE A DAY 16   Cornelio Ham MD        Allergies:  Review of patient's allergies indicates:   Allergen Reactions    Lisinopril Other (See Comments)     Other reaction(s):  cough    Actos  [pioglitazone] Other (See Comments)     Other reaction(s): CHF    Metformin Other (See Comments)     Other reaction(s): renal insuff  Other reaction(s): CHF        Social History:   Social History     Socioeconomic History    Marital status:     Number of children: 2   Occupational History    Occupation: retired     Employer: Retired   Tobacco Use    Smoking status: Former Smoker     Quit date: 2013     Years since quittin.9    Smokeless tobacco: Former User     Quit date: 2013    Tobacco comment: used marijuana since [id number], stopped after started dialysis   Substance and Sexual Activity    Alcohol use: No     Alcohol/week: 0.0 standard drinks    Drug use: No     Comment:      Sexual activity: Never   Social History Narrative    . Lives with spouse. Has 2 children. Patient retired as  for [employer]. He has been washing cars.       Family History:  Family History   Problem Relation Age of Onset    Diabetes Mother     Hypertension Mother     Heart failure Mother     Kidney disease Sister         ESRD    Diabetes Sister     Diabetes Maternal Grandmother     Cancer Neg Hx        ROS: No acute cardiac events, no acute respiratory complaints.     Physical Exam (all patients):    /71 " "  Pulse 102   Temp 98.4 °F (36.9 °C)   Resp 20   Ht 5' 7" (1.702 m)   Wt 129.3 kg (285 lb)   SpO2 98%   BMI 44.64 kg/m²   Lungs: Clear to auscultation bilaterally, respirations unlabored  Heart: Regular rate and rhythm, S1 and S2 normal, no obvious murmurs  Abdomen:         Soft, non-tender, bowel sounds normal, no masses, no organomegaly    Lab Results   Component Value Date    WBC 6.86 03/23/2022    MCV 87 03/23/2022    RDW 13.1 03/23/2022     03/23/2022    INR 1.0 06/18/2015     03/23/2022    HGBA1C 6.8 (H) 03/11/2022    BUN 31 (H) 03/23/2022     03/23/2022    K 4.3 03/23/2022     03/23/2022        SEDATION PLAN: per anesthesia      History reviewed, vital signs satisfactory, cardiopulmonary status satisfactory, sedation options, risks and plans have been discussed with the patient  All their questions were answered and the patient agrees to the sedation procedures as planned and the patient is deemed an appropriate candidate for the sedation as planned.    The risks, benefits and alternatives of the procedure were discussed with the patient in detail. This discussion was had in the presence of his wife and endoscopy staff. The risks include, risks of adverse reaction to sedation requiring the use of reversal agents, bleeding requiring blood transfusion, perforation requiring surgical intervention and technical failure. Other risks include aspiration leading to respiratory distress and respiratory failure resulting in endotracheal intubation and mechanical ventilation including death. If anesthesia is being utilized for this procedure, it is up to the anesthesiologist to determine airway safety including elective endotracheal intubation. Questions were answered, they agree to proceed. There was no language barriers.      Procedure explained to patient, informed consent obtained and placed in chart.    Alix Puente  5/2/2022  9:35 AM   "

## 2022-05-02 NOTE — PROVATION PATIENT INSTRUCTIONS
Discharge Summary/Instructions after an Endoscopic Procedure  Patient Name: Mitch Whittaker  Patient MRN: [id number]  Patient YOB: 1955  Monday, May 2, 2022 Alix Puente MD  Dear patient,  As a result of recent federal legislation (The Federal Cures Act), you may   receive lab or pathology results from your procedure in your [facility]   account before your physician is able to contact you. Your physician or   their representative will relay the results to you with their   recommendations at their soonest availability.  Thank you,  RESTRICTIONS:  During your procedure today, you received medications for sedation.  These   medications may affect your judgment, balance and coordination.  Therefore,   for 24 hours, you have the following restrictions:   - DO NOT drive a car, operate machinery, make legal/financial decisions,   sign important papers or drink alcohol.    ACTIVITY:  Today: no heavy lifting, straining or running due to procedural   sedation/anesthesia.  The following day: return to full activity including work.  DIET:  Eat and drink normally unless instructed otherwise.     TREATMENT FOR COMMON SIDE EFFECTS:  - Mild abdominal pain, nausea, belching, bloating or excessive gas:  rest,   eat lightly and use a heating pad.  - Sore Throat: treat with throat lozenges and/or gargle with warm salt   water.  - Because air was used during the procedure, expelling large amounts of air   from your rectum or belching is normal.  - If a bowel prep was taken, you may not have a bowel movement for 1-3 days.    This is normal.  SYMPTOMS TO WATCH FOR AND REPORT TO YOUR PHYSICIAN:  1. Abdominal pain or bloating, other than gas cramps.  2. Chest pain.  3. Back pain.  4. Signs of infection such as: chills or fever occurring within 24 hours   after the procedure.  5. Rectal bleeding, which would show as bright red, maroon, or black stools.   (A tablespoon of blood from the rectum is not serious, especially if    hemorrhoids are present.)  6. Vomiting.  7. Weakness or dizziness.  GO DIRECTLY TO THE NEAREST EMERGENCY ROOM IF YOU HAVE ANY OF THE FOLLOWING:      Difficulty breathing              Chills and/or fever over 101 F   Persistent vomiting and/or vomiting blood   Severe abdominal pain   Severe chest pain   Black, tarry stools   Bleeding- more than one tablespoon   Any other symptom or condition that you feel may need urgent attention  Your doctor recommends these additional instructions:  If any biopsies were taken, your doctors clinic will contact you in 1 to 2   weeks with any results.  - Discharge patient to home (with escort).   - Resume previous diet.   - Resume Eliquis (apixaban) at prior dose tomorrow.  Refer to referring   physician for further adjustment of therapy.   - Continue present medications.   - Await pathology results.   - Repeat colonoscopy in 5 years for surveillance based on pathology results.     - Return to primary care physician.  For questions, problems or results please call your physician Alix Puente MD at Work:  [phone]  If you have any questions about the above instructions, call the GI   department at [phone] or call the endoscopy unit at [phone]   from 7am until 3 pm.  [facility] [place], EMERGENCY ROOM PHONE NUMBER:   [phone]  IF A COMPLICATION OR EMERGENCY SITUATION ARISES AND YOU ARE UNABLE TO REACH   YOUR PHYSICIAN - GO DIRECTLY TO THE EMERGENCY ROOM.  I have read or have had read to me these discharge instructions for my   procedure and have received a written copy.  I understand these   instructions and will follow-up with my physician if I have any questions.     __________________________________       _____________________________________  Nurse Signature                                          Patient/Designated   Responsible Party Signature  MD Alix Cotton MD  5/2/2022 10:28:01 AM  This report has been  verified and signed electronically.  Dear patient,  As a result of recent federal legislation (The Federal Cures Act), you may   receive lab or pathology results from your procedure in your [facility]   account before your physician is able to contact you. Your physician or   their representative will relay the results to you with their   recommendations at their soonest availability.  Thank you,  PROVATION

## 2022-05-02 NOTE — TRANSFER OF CARE
"Anesthesia Transfer of Care Note    Patient: Mitch Whittaker    Procedure(s) Performed: Procedure(s) (LRB):  COLONOSCOPY (N/A)    Patient location: GI    Anesthesia Type: MAC    Transport from OR: Transported from OR on room air with adequate spontaneous ventilation    Post pain: adequate analgesia    Post assessment: no apparent anesthetic complications    Post vital signs: stable    Level of consciousness: sedated    Nausea/Vomiting: no nausea/vomiting    Complications: none    Transfer of care protocol was followed      Last vitals:   Visit Vitals  /71   Pulse 102   Temp 36.9 °C (98.4 °F)   Resp 20   Ht 5' 7" (1.702 m)   Wt 129.3 kg (285 lb)   SpO2 98%   BMI 44.64 kg/m²     "

## 2022-05-09 LAB
FINAL PATHOLOGIC DIAGNOSIS: NORMAL
GROSS: NORMAL
Lab: NORMAL
POCT GLUCOSE: 180 MG/DL (ref 70–110)

## 2022-05-10 NOTE — PROGRESS NOTES
The polyps removed were precancerous also known as adenomas. They were completely removed. Guidelines recommend a repeat colonoscopy in 5 years. We will send you a reminder as the time nears.

## 2022-05-11 ENCOUNTER — PATIENT MESSAGE (OUTPATIENT)
Dept: RESEARCH | Facility: CLINIC | Age: 67
End: 2022-05-11
Payer: COMMERCIAL

## 2022-05-16 ENCOUNTER — PATIENT OUTREACH (OUTPATIENT)
Dept: ADMINISTRATIVE | Facility: HOSPITAL | Age: 67
End: 2022-05-16
Payer: COMMERCIAL

## 2022-05-18 ENCOUNTER — TELEPHONE (OUTPATIENT)
Dept: CARDIOLOGY | Facility: CLINIC | Age: 67
End: 2022-05-18
Payer: COMMERCIAL

## 2022-05-18 ENCOUNTER — LAB VISIT (OUTPATIENT)
Dept: LAB | Facility: HOSPITAL | Age: 67
End: 2022-05-18
Attending: NURSE PRACTITIONER
Payer: COMMERCIAL

## 2022-05-18 ENCOUNTER — HOSPITAL ENCOUNTER (EMERGENCY)
Facility: HOSPITAL | Age: 67
Discharge: HOME | End: 2022-05-18
Attending: EMERGENCY MEDICINE
Payer: COMMERCIAL

## 2022-05-18 VITALS
SYSTOLIC BLOOD PRESSURE: 122 MMHG | DIASTOLIC BLOOD PRESSURE: 74 MMHG | RESPIRATION RATE: 18 BRPM | OXYGEN SATURATION: 98 % | HEART RATE: 91 BPM | TEMPERATURE: 99 F

## 2022-05-18 DIAGNOSIS — R07.9 CHEST PAIN, UNSPECIFIED TYPE: ICD-10-CM

## 2022-05-18 DIAGNOSIS — R07.9 CHEST PAIN: ICD-10-CM

## 2022-05-18 DIAGNOSIS — I50.42 CHRONIC COMBINED SYSTOLIC AND DIASTOLIC CONGESTIVE HEART FAILURE: ICD-10-CM

## 2022-05-18 DIAGNOSIS — R05.9 COUGH: Primary | ICD-10-CM

## 2022-05-18 LAB
ALBUMIN SERPL BCP-MCNC: 3.5 G/DL (ref 3.5–5.2)
ALP SERPL-CCNC: 65 U/L (ref 55–135)
ALT SERPL W/O P-5'-P-CCNC: 13 U/L (ref 10–44)
ANION GAP SERPL CALC-SCNC: 16 MMOL/L (ref 8–16)
ANION GAP SERPL CALC-SCNC: 17 MMOL/L (ref 8–16)
AST SERPL-CCNC: 21 U/L (ref 10–40)
BASOPHILS # BLD AUTO: 0.02 K/UL (ref 0–0.2)
BASOPHILS NFR BLD: 0.2 % (ref 0–1.9)
BILIRUB SERPL-MCNC: 0.5 MG/DL (ref 0.1–1)
BNP SERPL-MCNC: 41 PG/ML (ref 0–99)
BNP SERPL-MCNC: 45 PG/ML (ref 0–99)
BUN SERPL-MCNC: 53 MG/DL (ref 8–23)
BUN SERPL-MCNC: 56 MG/DL (ref 8–23)
CALCIUM SERPL-MCNC: 9.2 MG/DL (ref 8.7–10.5)
CALCIUM SERPL-MCNC: 9.3 MG/DL (ref 8.7–10.5)
CHLORIDE SERPL-SCNC: 102 MMOL/L (ref 95–110)
CHLORIDE SERPL-SCNC: 99 MMOL/L (ref 95–110)
CO2 SERPL-SCNC: 24 MMOL/L (ref 23–29)
CO2 SERPL-SCNC: 25 MMOL/L (ref 23–29)
CREAT SERPL-MCNC: 3 MG/DL (ref 0.5–1.4)
CREAT SERPL-MCNC: 3.3 MG/DL (ref 0.5–1.4)
DIFFERENTIAL METHOD: ABNORMAL
EOSINOPHIL # BLD AUTO: 0 K/UL (ref 0–0.5)
EOSINOPHIL NFR BLD: 0.2 % (ref 0–8)
ERYTHROCYTE [DISTWIDTH] IN BLOOD BY AUTOMATED COUNT: 13.2 % (ref 11.5–14.5)
EST. GFR  (AFRICAN AMERICAN): 21 ML/MIN/1.73 M^2
EST. GFR  (AFRICAN AMERICAN): 24 ML/MIN/1.73 M^2
EST. GFR  (NON AFRICAN AMERICAN): 18 ML/MIN/1.73 M^2
EST. GFR  (NON AFRICAN AMERICAN): 20 ML/MIN/1.73 M^2
GLUCOSE SERPL-MCNC: 102 MG/DL (ref 70–110)
GLUCOSE SERPL-MCNC: 253 MG/DL (ref 70–110)
HCT VFR BLD AUTO: 35.4 % (ref 40–54)
HGB BLD-MCNC: 10.5 G/DL (ref 14–18)
IMM GRANULOCYTES # BLD AUTO: 0.06 K/UL (ref 0–0.04)
IMM GRANULOCYTES NFR BLD AUTO: 0.6 % (ref 0–0.5)
LYMPHOCYTES # BLD AUTO: 0.7 K/UL (ref 1–4.8)
LYMPHOCYTES NFR BLD: 7.1 % (ref 18–48)
MCH RBC QN AUTO: 25.5 PG (ref 27–31)
MCHC RBC AUTO-ENTMCNC: 29.7 G/DL (ref 32–36)
MCV RBC AUTO: 86 FL (ref 82–98)
MONOCYTES # BLD AUTO: 0.7 K/UL (ref 0.3–1)
MONOCYTES NFR BLD: 7 % (ref 4–15)
NEUTROPHILS # BLD AUTO: 8.1 K/UL (ref 1.8–7.7)
NEUTROPHILS NFR BLD: 84.9 % (ref 38–73)
NRBC BLD-RTO: 0 /100 WBC
PLATELET # BLD AUTO: 174 K/UL (ref 150–450)
PMV BLD AUTO: 11.1 FL (ref 9.2–12.9)
POTASSIUM SERPL-SCNC: 3.5 MMOL/L (ref 3.5–5.1)
POTASSIUM SERPL-SCNC: 4.5 MMOL/L (ref 3.5–5.1)
PROT SERPL-MCNC: 7.1 G/DL (ref 6–8.4)
RBC # BLD AUTO: 4.12 M/UL (ref 4.6–6.2)
SODIUM SERPL-SCNC: 139 MMOL/L (ref 136–145)
SODIUM SERPL-SCNC: 144 MMOL/L (ref 136–145)
TROPONIN I SERPL DL<=0.01 NG/ML-MCNC: 0.04 NG/ML (ref 0–0.03)
WBC # BLD AUTO: 9.56 K/UL (ref 3.9–12.7)

## 2022-05-18 PROCEDURE — 84484 ASSAY OF TROPONIN QUANT: CPT | Performed by: NURSE PRACTITIONER

## 2022-05-18 PROCEDURE — 93010 EKG 12-LEAD: ICD-10-PCS | Mod: ,,, | Performed by: INTERNAL MEDICINE

## 2022-05-18 PROCEDURE — 99285 EMERGENCY DEPT VISIT HI MDM: CPT | Mod: 25

## 2022-05-18 PROCEDURE — 93005 ELECTROCARDIOGRAM TRACING: CPT

## 2022-05-18 PROCEDURE — 85025 COMPLETE CBC W/AUTO DIFF WBC: CPT | Performed by: NURSE PRACTITIONER

## 2022-05-18 PROCEDURE — 80048 BASIC METABOLIC PNL TOTAL CA: CPT | Performed by: NURSE PRACTITIONER

## 2022-05-18 PROCEDURE — 83880 ASSAY OF NATRIURETIC PEPTIDE: CPT | Mod: 91 | Performed by: NURSE PRACTITIONER

## 2022-05-18 PROCEDURE — 83880 ASSAY OF NATRIURETIC PEPTIDE: CPT | Performed by: NURSE PRACTITIONER

## 2022-05-18 PROCEDURE — 80053 COMPREHEN METABOLIC PANEL: CPT | Performed by: NURSE PRACTITIONER

## 2022-05-18 PROCEDURE — 93010 ELECTROCARDIOGRAM REPORT: CPT | Mod: ,,, | Performed by: INTERNAL MEDICINE

## 2022-05-18 PROCEDURE — 36415 COLL VENOUS BLD VENIPUNCTURE: CPT | Mod: PO | Performed by: NURSE PRACTITIONER

## 2022-05-18 RX ORDER — ONDANSETRON 4 MG/1
4 TABLET, FILM COATED ORAL EVERY 6 HOURS
Qty: 30 TABLET | Refills: 0 | Status: SHIPPED | OUTPATIENT
Start: 2022-05-18

## 2022-05-18 NOTE — FIRST PROVIDER EVALUATION
Medical screening exam completed.  I have conducted a focused provider triage encounter, findings are as follows:    Brief history of present illness:  Patient presents with bilateral chest pain and right arm swelling.  Also complains of worsening shortness of breath.    There were no vitals filed for this visit.    Pertinent physical exam:      Brief workup plan:      Preliminary workup initiated; this workup will be continued and followed by the physician or advanced practice provider that is assigned to the patient when roomed.

## 2022-05-18 NOTE — TELEPHONE ENCOUNTER
----- Message from FABRICIO Melara-PAULINO sent at 5/18/2022  2:34 PM CDT -----  Please notify patient    Renal function has worsened since last check  BNP has continued to decrease  Cut back metolazone to once a week  If he is doing well CHF wise, can follow up in 6 weeks    Jaymie

## 2022-05-18 NOTE — TELEPHONE ENCOUNTER
I called the patient with his results.     I spoke to the patient.     He says he has had daily headaches and a nagging squeezing chest pain located above his rib cage on the right side. His right arm also hurts with movement all at a 7/10. Pain started on Sunday and has been constant. Pt has not tried anything to make it better.     Short of breath continues since having covid.     Edema noted and pitting. Pt says it is slow to go back to normal.     128/78 98 285      I spoke to Jaymie Romero.   Orders:   The patient should go to the ED to be evaluated for chest pain.    Pt notified.

## 2022-05-19 NOTE — ED PROVIDER NOTES
SCRIBE #1 NOTE: I, Stu Hanson, am scribing for, and in the presence of, Dianne Irving Do, MD. I have scribed the entire note.       History     Chief Complaint   Patient presents with    Chest Pain     Pt has been having SOB and chest pain since Thursday. Pt also states he was taken off and then put back on a fluid which has been causing him right sided abdominal pain.      Review of patient's allergies indicates:   Allergen Reactions    Lisinopril Other (See Comments)     Other reaction(s):  cough    Actos  [pioglitazone] Other (See Comments)     Other reaction(s): CHF    Metformin Other (See Comments)     Other reaction(s): renal insuff  Other reaction(s): CHF         History of Present Illness     HPI    5/18/2022, 8:39 PM  History obtained from the patient      History of Present Illness: Mitch Whittaker is a 66 y.o. male patient with a PMHx of CHF, DM, HTN, and a kidney transplant who presents to the Emergency Department for evaluation of chest pain which onset gradually. Pt states that the pain arose after a coughing fit and has really resolved since. Pt states the dialysis portal in their right arm is occasionally painful.  He is not using his dialysis shunt as he has had a renal transplant in the past.  He reports no trauma to it he has not noted any swelling or drainage to it it just hurts every once in a while.  No mitigating or exacerbating factors reported. Associated symptoms include abdominal pain, coughing, and headache. Patient denies any fever, chills, N/V/D, SOB, weakness, and all other sxs at this time. No further complaints or concerns at this time.       Arrival mode: Personal vehicle     PCP: Cornelio Ham MD        Past Medical History:  Past Medical History:   Diagnosis Date    Acquired renal cyst of left kidney     Anemia associated with chronic renal failure     CAD (coronary artery disease)     nonobstructive [facility] 9/14    CHF (congestive heart failure)     Chronic  immunosuppression with Prograf and MMF 06/18/2015    Chronic venous insufficiency of lower extremity     CKD (chronic kidney disease) stage 3, GFR 30-59 ml/min     Diabetic retinopathy     DM (diabetes mellitus), type 2 with complications 1994    Edema     End stage kidney disease     s/p transplant, doing well    Gallbladder polyp     Heart failure, diastolic, due to HTN     Hemodialysis status     off since transplant    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 RNA NEGATIVE 5/2015, 2021    History of colon polyps     HPTH (hyperparathyroidism)     Hyperlipidemia     Hypertension associated with stage 3 chronic kidney disease due to type 2 diabetes mellitus     LBBB (left bundle branch block) 12/20/2021    Morbid obesity with BMI of 45.0-49.9, adult     PCO (posterior capsular opacification), left 03/04/2019    Proteinuria     resolved s/p transplant    S/P kidney transplant     Sleep apnea     Type 2 diabetes, uncontrolled, with retinopathy     Type II diabetes mellitus with renal manifestations        Past Surgical History:  Past Surgical History:   Procedure Laterality Date    CARDIAC CATHETERIZATION  2008    normal coronary    COLONOSCOPY N/A 4/5/2018    Procedure: COLONOSCOPY;  Surgeon: Chava Ronquillo MD;  Location: [facility] ENDO;  Service: Endoscopy;  Laterality: N/A;    COLONOSCOPY N/A 5/2/2022    Procedure: COLONOSCOPY;  Surgeon: Alix Puente MD;  Location: [facility] ENDO;  Service: Endoscopy;  Laterality: N/A;    KIDNEY TRANSPLANT      RETINAL LASER PROCEDURE           Family History:  Family History   Problem Relation Age of Onset    Diabetes Mother     Hypertension Mother     Heart failure Mother     Kidney disease Sister         ESRD    Diabetes Sister     Diabetes Maternal Grandmother     Cancer Neg Hx        Social History:  Social History     Tobacco Use    Smoking status: Former Smoker     Quit date: 5/25/2013     Years since quitting:  8.9    Smokeless tobacco: Former User     Quit date: 6/11/2013    Tobacco comment: used marijuana since 3602-0912, stopped after started dialysis   Substance and Sexual Activity    Alcohol use: No     Alcohol/week: 0.0 standard drinks    Drug use: No     Comment:      Sexual activity: Never        Review of Systems     Review of Systems   Constitutional: Negative for chills and fever.   HENT: Negative for sore throat.    Respiratory: Positive for cough. Negative for shortness of breath.    Cardiovascular: Negative for chest pain.   Gastrointestinal: Positive for abdominal pain. Negative for diarrhea, nausea and vomiting.   Genitourinary: Negative for dysuria.   Musculoskeletal: Negative for back pain.   Skin: Negative for rash.   Neurological: Positive for headaches. Negative for weakness.   Hematological: Does not bruise/bleed easily.   All other systems reviewed and are negative.       Physical Exam     Initial Vitals   BP Pulse Resp Temp SpO2   05/18/22 1634 05/18/22 1634 05/18/22 1634 05/18/22 1636 05/18/22 1634   118/66 96 (!) 21 98.9 °F (37.2 °C) 97 %      MAP       --                 Physical Exam  Nursing Notes and Vital Signs Reviewed.  Constitutional: Patient is in no acute distress. Well-developed and well-nourished.  Head: Atraumatic. Normocephalic.  Eyes: PERRL. EOM intact. Conjunctivae are not pale. No scleral icterus.  ENT: Mucous membranes are moist. Oropharynx is clear and symmetric.    Neck: Supple. Full ROM. No lymphadenopathy.  Cardiovascular: Regular rate. Regular rhythm. No murmurs, rubs, or gallops. Distal pulses are 2+ and symmetric.  Pulmonary/Chest: No respiratory distress. Clear to auscultation bilaterally. No wheezing or rales.  Abdominal: Soft and non-distended.  There is no tenderness.  No rebound, guarding, or rigidity. Good bowel sounds.  Genitourinary: No CVA tenderness.  Musculoskeletal: Moves all extremities. Shunt to right arm. No obvious deformities. No edema. No calf  tenderness.  Patient has a shunt to the right arm and antecubital area, and has a good bounding pulse and bruit, there is no swelling to the arm at all, there is no cellulitis or signs infection, radial pulses 2+ with capillary refill less than 2 seconds  Skin: Warm and dry. No zoster.   Neurological:  Alert, awake, and appropriate.  Normal speech.  No acute focal neurological deficits are appreciated.  Psychiatric: Normal affect. Good eye contact. Appropriate in content.     ED Course   Procedures  ED Vital Signs:  Vitals:    05/18/22 1634 05/18/22 1636 05/18/22 2115   BP: 118/66  122/74   Pulse: 96  91   Resp: (!) 21  18   Temp:  98.9 °F (37.2 °C) 98.8 °F (37.1 °C)   TempSrc:  Oral    SpO2: 97%  98%       Abnormal Lab Results:  Labs Reviewed   CBC W/ AUTO DIFFERENTIAL - Abnormal; Notable for the following components:       Result Value    RBC 4.12 (*)     Hemoglobin 10.5 (*)     Hematocrit 35.4 (*)     MCH 25.5 (*)     MCHC 29.7 (*)     Immature Granulocytes 0.6 (*)     Gran # (ANC) 8.1 (*)     Immature Grans (Abs) 0.06 (*)     Lymph # 0.7 (*)     Gran % 84.9 (*)     Lymph % 7.1 (*)     All other components within normal limits   COMPREHENSIVE METABOLIC PANEL - Abnormal; Notable for the following components:    Glucose 253 (*)     BUN 56 (*)     Creatinine 3.3 (*)     eGFR if  21 (*)     eGFR if non  18 (*)     All other components within normal limits   TROPONIN I - Abnormal; Notable for the following components:    Troponin I 0.045 (*)     All other components within normal limits   B-TYPE NATRIURETIC PEPTIDE        All Lab Results:  Results for orders placed or performed during the hospital encounter of 05/18/22   Brain natriuretic peptide   Result Value Ref Range    BNP 41 0 - 99 pg/mL   CBC auto differential   Result Value Ref Range    WBC 9.56 3.90 - 12.70 K/uL    RBC 4.12 (L) 4.60 - 6.20 M/uL    Hemoglobin 10.5 (L) 14.0 - 18.0 g/dL    Hematocrit 35.4 (L) 40.0 - 54.0 %    MCV  86 82 - 98 fL    MCH 25.5 (L) 27.0 - 31.0 pg    MCHC 29.7 (L) 32.0 - 36.0 g/dL    RDW 13.2 11.5 - 14.5 %    Platelets 174 150 - 450 K/uL    MPV 11.1 9.2 - 12.9 fL    Immature Granulocytes 0.6 (H) 0.0 - 0.5 %    Gran # (ANC) 8.1 (H) 1.8 - 7.7 K/uL    Immature Grans (Abs) 0.06 (H) 0.00 - 0.04 K/uL    Lymph # 0.7 (L) 1.0 - 4.8 K/uL    Mono # 0.7 0.3 - 1.0 K/uL    Eos # 0.0 0.0 - 0.5 K/uL    Baso # 0.02 0.00 - 0.20 K/uL    nRBC 0 0 /100 WBC    Gran % 84.9 (H) 38.0 - 73.0 %    Lymph % 7.1 (L) 18.0 - 48.0 %    Mono % 7.0 4.0 - 15.0 %    Eosinophil % 0.2 0.0 - 8.0 %    Basophil % 0.2 0.0 - 1.9 %    Differential Method Automated    Comprehensive metabolic panel   Result Value Ref Range    Sodium 139 136 - 145 mmol/L    Potassium 4.5 3.5 - 5.1 mmol/L    Chloride 99 95 - 110 mmol/L    CO2 24 23 - 29 mmol/L    Glucose 253 (H) 70 - 110 mg/dL    BUN 56 (H) 8 - 23 mg/dL    Creatinine 3.3 (H) 0.5 - 1.4 mg/dL    Calcium 9.2 8.7 - 10.5 mg/dL    Total Protein 7.1 6.0 - 8.4 g/dL    Albumin 3.5 3.5 - 5.2 g/dL    Total Bilirubin 0.5 0.1 - 1.0 mg/dL    Alkaline Phosphatase 65 55 - 135 U/L    AST 21 10 - 40 U/L    ALT 13 10 - 44 U/L    Anion Gap 16 8 - 16 mmol/L    eGFR if African American 21 (A) >60 mL/min/1.73 m^2    eGFR if non African American 18 (A) >60 mL/min/1.73 m^2   Troponin I   Result Value Ref Range    Troponin I 0.045 (H) 0.000 - 0.026 ng/mL     *Note: Due to a large number of results and/or encounters for the requested time period, some results have not been displayed. A complete set of results can be found in Results Review.         Imaging Results:  Imaging Results          X-Ray Chest 1 View (Final result)  Result time 05/18/22 17:58:57    Final result by Favian Anaya MD (05/18/22 17:58:57)                 Impression:      No acute abnormality.      Electronically signed by: Favian Anaya  Date:    05/18/2022  Time:    17:58             Narrative:    EXAMINATION:  XR CHEST 1 VIEW    CLINICAL HISTORY:  Chest pain,  unspecified    TECHNIQUE:  Single frontal view of the chest was performed.    COMPARISON:  Multiple priors.    FINDINGS:  The lungs are clear, with normal appearance of pulmonary vasculature and no pleural effusion or pneumothorax.    The cardiac silhouette is borderline enlarged.  Aortic atherosclerosis.  The hilar and mediastinal contours are unremarkable.    Bones are intact.                                 The EKG was ordered, reviewed, and independently interpreted by the ED provider.  Interpretation time: 16:29  Rate: 92 BPM  Rhythm: normal sinus rhythm  Interpretation: Left axis deviation. Left bundle branch block. No acute ST elevations. No STEMI.  When compared to EKG performed 20-MAR-2022, there are abnormal changes.The axis shifted left. T wave inversion no longer evident in inferior leads. T wave inversion less evident in lateral leads.           The Emergency Provider reviewed the vital signs and test results, which are outlined above.     ED Discussion       9:12 PM:  Patient looks great, he has no acute symptoms at this time.  He has had on and off pain to the right arm but nothing acute today.  He had some right-sided chest pain after coughing earlier today.  He reports that he had COVID may be back in late January or early February he has occasional coughing ever since then.  He has had no chest pain outside of the coughing fit earlier today.  Reassessed pt at this time. Discussed with pt all pertinent ED information and results. Discussed pt dx and plan of tx. Gave pt all f/u and return to the ED instructions. All questions and concerns were addressed at this time. Pt expresses understanding of information and instructions, and is comfortable with plan to discharge. Pt is stable for discharge.    I discussed with patient and/or family/caretaker that evaluation in the ED does not suggest any emergent or life threatening medical conditions requiring immediate intervention beyond what was provided in  the ED, and I believe patient is safe for discharge.  Regardless, an unremarkable evaluation in the ED does not preclude the development or presence of a serious of life threatening condition. As such, patient was instructed to return immediately for any worsening or change in current symptoms.         Medical Decision Making:   Clinical Tests:   Lab Tests: Ordered and Reviewed  Radiological Study: Reviewed and Ordered  Medical Tests: Ordered and Reviewed           ED Medication(s):  Medications - No data to display    Discharge Medication List as of 5/18/2022  9:09 PM      START taking these medications    Details   ondansetron (ZOFRAN) 4 MG tablet Take 1 tablet (4 mg total) by mouth every 6 (six) hours., Starting Wed 5/18/2022, Normal              Follow-up Information     Cornelio Ham MD In 1 day.    Specialty: Internal Medicine  Contact information:  [facility]  [address]  [facility] [address]  [phone]                             Scribe Attestation:   Scribe #1: I performed the above scribed service and the documentation accurately describes the services I performed. I attest to the accuracy of the note.     Attending:   Physician Attestation Statement for Scribe #1: I, Dianne Irving Do, MD, personally performed the services described in this documentation, as scribed by Stu Hanson, in my presence, and it is both accurate and complete.           Clinical Impression       ICD-10-CM ICD-9-CM   1. Cough  R05.9 786.2   2. Chest pain  R07.9 786.50   3. Chest pain, unspecified type  R07.9 786.50       Disposition:   Disposition: Discharged  Condition: Stable       Dianne Irving Do, MD  05/18/22 [id number]

## 2022-05-25 ENCOUNTER — PATIENT MESSAGE (OUTPATIENT)
Dept: RESEARCH | Facility: HOSPITAL | Age: 67
End: 2022-05-25
Payer: COMMERCIAL

## 2022-05-31 ENCOUNTER — PATIENT MESSAGE (OUTPATIENT)
Dept: PHARMACY | Facility: CLINIC | Age: 67
End: 2022-05-31
Payer: COMMERCIAL

## 2022-06-06 RX ORDER — CALCITRIOL 0.25 UG/1
0.25 CAPSULE ORAL DAILY
Qty: 30 CAPSULE | Refills: 11 | Status: SHIPPED | OUTPATIENT
Start: 2022-06-06 | End: 2023-05-30 | Stop reason: SDUPTHER

## 2022-06-11 DIAGNOSIS — Z94.0 KIDNEY REPLACED BY TRANSPLANT: ICD-10-CM

## 2022-06-13 RX ORDER — MYCOPHENOLATE MOFETIL 250 MG/1
750 CAPSULE ORAL 2 TIMES DAILY
Qty: 180 CAPSULE | Refills: 11 | Status: SHIPPED | OUTPATIENT
Start: 2022-06-13 | End: 2023-04-06 | Stop reason: SDUPTHER

## 2022-06-17 DIAGNOSIS — Z94.0 KIDNEY REPLACED BY TRANSPLANT: ICD-10-CM

## 2022-06-20 RX ORDER — PREDNISONE 5 MG/1
5 TABLET ORAL DAILY
Qty: 30 TABLET | Refills: 11 | Status: SHIPPED | OUTPATIENT
Start: 2022-06-20 | End: 2023-06-15 | Stop reason: SDUPTHER

## 2022-07-01 DIAGNOSIS — I50.42 CHRONIC COMBINED SYSTOLIC AND DIASTOLIC CONGESTIVE HEART FAILURE: ICD-10-CM

## 2022-07-01 RX ORDER — POTASSIUM CHLORIDE 20 MEQ/1
40 TABLET, EXTENDED RELEASE ORAL DAILY
Qty: 60 TABLET | Refills: 3 | Status: SHIPPED | OUTPATIENT
Start: 2022-07-01 | End: 2022-10-28 | Stop reason: SDUPTHER

## 2022-07-06 ENCOUNTER — LAB VISIT (OUTPATIENT)
Dept: LAB | Facility: HOSPITAL | Age: 67
End: 2022-07-06
Attending: INTERNAL MEDICINE
Payer: COMMERCIAL

## 2022-07-06 DIAGNOSIS — Z94.0 KIDNEY TRANSPLANTED: ICD-10-CM

## 2022-07-06 LAB
ALBUMIN SERPL BCP-MCNC: 3.5 G/DL (ref 3.5–5.2)
ANION GAP SERPL CALC-SCNC: 13 MMOL/L (ref 8–16)
BASOPHILS # BLD AUTO: 0.02 K/UL (ref 0–0.2)
BASOPHILS NFR BLD: 0.3 % (ref 0–1.9)
BUN SERPL-MCNC: 56 MG/DL (ref 8–23)
CALCIUM SERPL-MCNC: 9.3 MG/DL (ref 8.7–10.5)
CHLORIDE SERPL-SCNC: 99 MMOL/L (ref 95–110)
CO2 SERPL-SCNC: 28 MMOL/L (ref 23–29)
CREAT SERPL-MCNC: 3.1 MG/DL (ref 0.5–1.4)
DIFFERENTIAL METHOD: ABNORMAL
EOSINOPHIL # BLD AUTO: 0 K/UL (ref 0–0.5)
EOSINOPHIL NFR BLD: 0.3 % (ref 0–8)
ERYTHROCYTE [DISTWIDTH] IN BLOOD BY AUTOMATED COUNT: 13 % (ref 11.5–14.5)
EST. GFR  (AFRICAN AMERICAN): 22.7 ML/MIN/1.73 M^2
EST. GFR  (NON AFRICAN AMERICAN): 19.6 ML/MIN/1.73 M^2
GLUCOSE SERPL-MCNC: 302 MG/DL (ref 70–110)
HCT VFR BLD AUTO: 35.9 % (ref 40–54)
HGB BLD-MCNC: 10.7 G/DL (ref 14–18)
IMM GRANULOCYTES # BLD AUTO: 0.11 K/UL (ref 0–0.04)
IMM GRANULOCYTES NFR BLD AUTO: 1.6 % (ref 0–0.5)
LYMPHOCYTES # BLD AUTO: 0.9 K/UL (ref 1–4.8)
LYMPHOCYTES NFR BLD: 12 % (ref 18–48)
MCH RBC QN AUTO: 26.2 PG (ref 27–31)
MCHC RBC AUTO-ENTMCNC: 29.8 G/DL (ref 32–36)
MCV RBC AUTO: 88 FL (ref 82–98)
MONOCYTES # BLD AUTO: 0.7 K/UL (ref 0.3–1)
MONOCYTES NFR BLD: 9.4 % (ref 4–15)
NEUTROPHILS # BLD AUTO: 5.4 K/UL (ref 1.8–7.7)
NEUTROPHILS NFR BLD: 76.4 % (ref 38–73)
NRBC BLD-RTO: 0 /100 WBC
PHOSPHATE SERPL-MCNC: 2.4 MG/DL (ref 2.7–4.5)
PLATELET # BLD AUTO: 173 K/UL (ref 150–450)
PMV BLD AUTO: 11.2 FL (ref 9.2–12.9)
POTASSIUM SERPL-SCNC: 3.6 MMOL/L (ref 3.5–5.1)
RBC # BLD AUTO: 4.08 M/UL (ref 4.6–6.2)
SODIUM SERPL-SCNC: 140 MMOL/L (ref 136–145)
WBC # BLD AUTO: 7.09 K/UL (ref 3.9–12.7)

## 2022-07-06 PROCEDURE — 36415 COLL VENOUS BLD VENIPUNCTURE: CPT | Performed by: INTERNAL MEDICINE

## 2022-07-06 PROCEDURE — 80069 RENAL FUNCTION PANEL: CPT | Performed by: INTERNAL MEDICINE

## 2022-07-06 PROCEDURE — 85025 COMPLETE CBC W/AUTO DIFF WBC: CPT | Performed by: INTERNAL MEDICINE

## 2022-07-06 PROCEDURE — 80197 ASSAY OF TACROLIMUS: CPT | Performed by: INTERNAL MEDICINE

## 2022-07-07 LAB — TACROLIMUS BLD-MCNC: 6.8 NG/ML (ref 5–15)

## 2022-07-11 ENCOUNTER — OFFICE VISIT (OUTPATIENT)
Dept: NEPHROLOGY | Facility: CLINIC | Age: 67
End: 2022-07-11
Payer: COMMERCIAL

## 2022-07-11 VITALS
BODY MASS INDEX: 45.15 KG/M2 | RESPIRATION RATE: 18 BRPM | HEIGHT: 67 IN | HEART RATE: 100 BPM | DIASTOLIC BLOOD PRESSURE: 60 MMHG | SYSTOLIC BLOOD PRESSURE: 110 MMHG | WEIGHT: 287.69 LBS

## 2022-07-11 DIAGNOSIS — Z94.0 KIDNEY TRANSPLANTED: Primary | ICD-10-CM

## 2022-07-11 PROCEDURE — 3078F DIAST BP <80 MM HG: CPT | Mod: CPTII,S$GLB,, | Performed by: INTERNAL MEDICINE

## 2022-07-11 PROCEDURE — 1159F PR MEDICATION LIST DOCUMENTED IN MEDICAL RECORD: ICD-10-PCS | Mod: CPTII,S$GLB,, | Performed by: INTERNAL MEDICINE

## 2022-07-11 PROCEDURE — 3074F PR MOST RECENT SYSTOLIC BLOOD PRESSURE < 130 MM HG: ICD-10-PCS | Mod: CPTII,S$GLB,, | Performed by: INTERNAL MEDICINE

## 2022-07-11 PROCEDURE — 99999 PR PBB SHADOW E&M-EST. PATIENT-LVL III: CPT | Mod: PBBFAC,,, | Performed by: INTERNAL MEDICINE

## 2022-07-11 PROCEDURE — 4010F PR ACE/ARB THEARPY RXD/TAKEN: ICD-10-PCS | Mod: CPTII,S$GLB,, | Performed by: INTERNAL MEDICINE

## 2022-07-11 PROCEDURE — 3078F PR MOST RECENT DIASTOLIC BLOOD PRESSURE < 80 MM HG: ICD-10-PCS | Mod: CPTII,S$GLB,, | Performed by: INTERNAL MEDICINE

## 2022-07-11 PROCEDURE — 99999 PR PBB SHADOW E&M-EST. PATIENT-LVL III: ICD-10-PCS | Mod: PBBFAC,,, | Performed by: INTERNAL MEDICINE

## 2022-07-11 PROCEDURE — 3008F BODY MASS INDEX DOCD: CPT | Mod: CPTII,S$GLB,, | Performed by: INTERNAL MEDICINE

## 2022-07-11 PROCEDURE — 3066F NEPHROPATHY DOC TX: CPT | Mod: CPTII,S$GLB,, | Performed by: INTERNAL MEDICINE

## 2022-07-11 PROCEDURE — 3072F PR LOW RISK FOR RETINOPATHY: ICD-10-PCS | Mod: CPTII,S$GLB,, | Performed by: INTERNAL MEDICINE

## 2022-07-11 PROCEDURE — 99215 PR OFFICE/OUTPT VISIT, EST, LEVL V, 40-54 MIN: ICD-10-PCS | Mod: S$GLB,,, | Performed by: INTERNAL MEDICINE

## 2022-07-11 PROCEDURE — 3044F PR MOST RECENT HEMOGLOBIN A1C LEVEL <7.0%: ICD-10-PCS | Mod: CPTII,S$GLB,, | Performed by: INTERNAL MEDICINE

## 2022-07-11 PROCEDURE — 99215 OFFICE O/P EST HI 40 MIN: CPT | Mod: S$GLB,,, | Performed by: INTERNAL MEDICINE

## 2022-07-11 PROCEDURE — 1101F PT FALLS ASSESS-DOCD LE1/YR: CPT | Mod: CPTII,S$GLB,, | Performed by: INTERNAL MEDICINE

## 2022-07-11 PROCEDURE — 4010F ACE/ARB THERAPY RXD/TAKEN: CPT | Mod: CPTII,S$GLB,, | Performed by: INTERNAL MEDICINE

## 2022-07-11 PROCEDURE — 3288F FALL RISK ASSESSMENT DOCD: CPT | Mod: CPTII,S$GLB,, | Performed by: INTERNAL MEDICINE

## 2022-07-11 PROCEDURE — 3061F NEG MICROALBUMINURIA REV: CPT | Mod: CPTII,S$GLB,, | Performed by: INTERNAL MEDICINE

## 2022-07-11 PROCEDURE — 3288F PR FALLS RISK ASSESSMENT DOCUMENTED: ICD-10-PCS | Mod: CPTII,S$GLB,, | Performed by: INTERNAL MEDICINE

## 2022-07-11 PROCEDURE — 3061F PR NEG MICROALBUMINURIA RESULT DOCUMENTED/REVIEW: ICD-10-PCS | Mod: CPTII,S$GLB,, | Performed by: INTERNAL MEDICINE

## 2022-07-11 PROCEDURE — 1159F MED LIST DOCD IN RCRD: CPT | Mod: CPTII,S$GLB,, | Performed by: INTERNAL MEDICINE

## 2022-07-11 PROCEDURE — 3044F HG A1C LEVEL LT 7.0%: CPT | Mod: CPTII,S$GLB,, | Performed by: INTERNAL MEDICINE

## 2022-07-11 PROCEDURE — 3008F PR BODY MASS INDEX (BMI) DOCUMENTED: ICD-10-PCS | Mod: CPTII,S$GLB,, | Performed by: INTERNAL MEDICINE

## 2022-07-11 PROCEDURE — 3072F LOW RISK FOR RETINOPATHY: CPT | Mod: CPTII,S$GLB,, | Performed by: INTERNAL MEDICINE

## 2022-07-11 PROCEDURE — 3066F PR DOCUMENTATION OF TREATMENT FOR NEPHROPATHY: ICD-10-PCS | Mod: CPTII,S$GLB,, | Performed by: INTERNAL MEDICINE

## 2022-07-11 PROCEDURE — 3074F SYST BP LT 130 MM HG: CPT | Mod: CPTII,S$GLB,, | Performed by: INTERNAL MEDICINE

## 2022-07-11 PROCEDURE — 1101F PR PT FALLS ASSESS DOC 0-1 FALLS W/OUT INJ PAST YR: ICD-10-PCS | Mod: CPTII,S$GLB,, | Performed by: INTERNAL MEDICINE

## 2022-07-11 NOTE — PROGRESS NOTES
Renal clinic f/u note:  Date of clinic visit: 7/11/22  Reason for f/u and chief c/o: h/o of kidney transplant. CHF, CKD     HPI: Pt is a 65 y/o male with h/o of living related kidney transplant from his daughter in 2015 who presents for f/u. 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 Pt presents with his daughter to renal clinic. He was last seen by us in March 2022. Chart was reviewed.  Labs, meds, immunosuppressive meds and doses reviewed with pt.    Pt continues to have has issues with volume overload. Despite being advised about fluid control, he continues to drink water extensively. He has been dependent on lasix bid and metolazone for fluid balance. Out of concern for causing over-diuresis, metolazone was previously put on hold, but was re-started to be taken twice a week currently. Dose was lowered to once per week to avoid overdiuresis.Low BP in the past made the use of diuretics more challenging.    On f/u today, pt denies SOB but has leg swelling.      PAST MEDICAL HISTORY: living related kidney transplant form daughter 6/15/2015 (on HD pre-transplant x 2.5 years), CKD stage 3, DM-2, HTN, CAD (coronary artery disease), combined diastolic and systolic (EF 40%) CHF, Chronic venous insufficiency of lower extremity, Diabetic retinopathy, Gallbladder polyp, 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, History of colon polyps, HPTH (hyperparathyroidism), Hyperlipidemia, LBBB (left bundle branch block) (12/20/2021), Morbid obesity with BMI of 45.0-49.9, adult, PCO (posterior capsular opacification), left (3/4/2019), Sleep apnea,     PAST SURGICAL HISTORY:  He  has a past surgical history that includes Retinal laser procedure; Cardiac catheterization (2008); Kidney transplant; and Colonoscopy (N/A, 4/5/2018).     SOCIAL HISTORY:  He  reports that he quit smoking about 8 years ago. He quit smokeless tobacco use about 8 years ago. He reports that he does not drink  "alcohol and does not use drugs.     FAMILY MEDICAL HISTORY:  His family history includes Diabetes in his maternal grandmother, mother, and sister; Heart failure in his mother; Hypertension in his mother; Kidney disease in his sister.               Review of patient's allergies indicates:   Allergen Reactions    Lisinopril Other (See Comments)       Other reaction(s):  cough    Actos  [pioglitazone]         Other reaction(s): chf    Metformin         Other reaction(s): renal insuff  Other reaction(s): chf      Meds reviewed    Current Outpatient Medications:     apixaban (ELIQUIS) 5 mg Tab, Take 1 tablet (5 mg total) by mouth 2 (two) times daily., Disp: 60 tablet, Rfl: 6    aspirin (ECOTRIN) 81 MG EC tablet, Take 1 tablet by mouth Daily. , Disp: , Rfl:     BD ULTRA-FINE MINI PEN NEEDLE 31 gauge x 3/16" Ndle, Use to inject insulin as needed up to 4 times daily (Patient taking differently: Use to inject insulin as needed up to 4 times daily), Disp: 100 each, Rfl: 3    blood sugar diagnostic Strp, Use to test four times per day (Patient taking differently: Use to test four times per day), Disp: 150 strip, Rfl: 11    calcitRIOL (ROCALTROL) 0.25 MCG Cap, Take 1 capsule (0.25 mcg total) by mouth once daily., Disp: 30 capsule, Rfl: 11    famotidine (PEPCID) 20 MG tablet, TAKE ONE TABLET BY MOUTH EVERY EVENING, Disp: 30 tablet, Rfl: 11    fish oil-omega-3 fatty acids 300-1,000 mg capsule, Take 1 g by mouth once daily., Disp: , Rfl:     furosemide (LASIX) 80 MG tablet, Take one-half tablet (40 mg) by mouth 2 (two) times daily., Disp: 30 tablet, Rfl: 11    glimepiride (AMARYL) 2 MG tablet, Take 1 tablet (2 mg total) by mouth before breakfast., Disp: 90 tablet, Rfl: 3    insulin (LANTUS SOLOSTAR U-100 INSULIN) glargine 100 units/mL SubQ pen, Inject 35 Units into the skin 2 (two) times daily., Disp: 30 mL, Rfl: 11    insulin aspart U-100 (NOVOLOG FLEXPEN U-100 INSULIN) 100 unit/mL (3 mL) InPn pen, Inject 25 Units " "into the skin 3 (three) times daily with meals. (Patient taking differently: Inject 20-30 Units into the skin 2 (two) times daily with meals.), Disp: 30 mL, Rfl: 11    ketoconazole (NIZORAL) 200 mg Tab, Take HALF A tablet (100 mg total) by mouth once daily., Disp: 15 tablet, Rfl: 9    lancets 33 gauge Misc, 1 Stick by Misc.(Non-Drug; Combo Route) route as directed. Contour lancets. Use to check BG 2-3 times daily., Disp: 150 each, Rfl: 11    magnesium oxide (MAG-OX) 400 mg (241.3 mg magnesium) tablet, Take 1 tablet (400 mg total) by mouth once daily., Disp: 90 tablet, Rfl: 1    metOLazone (ZAROXOLYN) 2.5 MG tablet, Take 1 tablet by mouth on Monday and Friday as directed, Disp: 30 tablet, Rfl: 11    metoprolol succinate (TOPROL-XL) 25 MG 24 hr tablet, Take 1 tablet (25 mg total) by mouth once daily., Disp: 30 tablet, Rfl: 11    multivitamin (THERAGRAN) tablet, Take 1 tablet by mouth once daily., Disp: , Rfl:     mupirocin (BACTROBAN) 2 % ointment, Apply topically 3 (three) times daily. Use as directed on the leg wound., Disp: 22 g, Rfl: 0    mycophenolate (CELLCEPT) 250 mg Cap, Take 3 capsules (750 mg total) by mouth 2 (two) times daily., Disp: 180 capsule, Rfl: 11    pen needle, diabetic (BD INSULIN PEN NEEDLE UF SHORT) 31 gauge x 5/16" Ndle, USE TO INJECT INSULIN TWICE A DAY, Disp: 200 each, Rfl: 5    potassium chloride SA (K-DUR,KLOR-CON) 20 MEQ tablet, Take 2 tablets (40 mEq total) by mouth once daily., Disp: 60 tablet, Rfl: 3    predniSONE (DELTASONE) 5 MG tablet, Take 1 tablet (5 mg total) by mouth once daily., Disp: 30 tablet, Rfl: 11    rosuvastatin (CRESTOR) 40 MG Tab, Take 1 tablet (40 mg total) by mouth once daily in the evening., Disp: 90 tablet, Rfl: 3    sacubitriL-valsartan (ENTRESTO)  mg per tablet, Take 1 tablet by mouth 2 (two) times daily., Disp: 60 tablet, Rfl: 3    tacrolimus (PROGRAF) 1 MG Cap, Take 4 capsules (4 mg total) by mouth every 12 (twelve) hours., Disp: 240 capsule, " Rfl: 11    tamsulosin (FLOMAX) 0.4 mg Cap, Take 1 capsule (0.4 mg total) by mouth once daily., Disp: 30 capsule, Rfl: 11    ondansetron (ZOFRAN) 4 MG tablet, Take 1 tablet (4 mg total) by mouth every 6 (six) hours. (Patient not taking: Reported on 7/11/2022), Disp: 30 tablet, Rfl: 0    promethazine-dextromethorphan (PROMETHAZINE-DM) 6.25-15 mg/5 mL Syrp, Take by mouth., Disp: , Rfl:         REVIEW OF SYSTEMS:  Patient has no fever, fatigue, visual changes, chest pain, edema, cough, dyspnea, nausea, vomiting, constipation, diarrhea, arthralgias, pruritis, dizziness, weakness, depression, confusion.     PHYSICAL EXAM:  Blood pressure 110/60, pulse 100, resp. rate 20, weight 287 lbs, from 283 lbs, from 279 lbs.  Gen:    WDWN male in no apparent distress  Psych: Normal mood and affect  Skin:    No rashes or ulcers  Neck:   No JVD  Chest:  Clear with no rales, rhonchi, wheezing with normal effort  CV:      Regular with no murmurs, gallops or rubs  Abd:     Soft, nontender, no distension  Ext:      3+ edema     Labs reviewed  BMP        Lab Results   Component Value Date      03/23/2022     K 4.3 03/23/2022      03/23/2022     CO2 27 03/23/2022     BUN 31 (H) 03/23/2022     CREATININE 2.3 (H) 03/23/2022     CALCIUM 9.3 03/23/2022     ANIONGAP 7 (L) 03/23/2022     ESTGFRAFRICA 32.5 (A) 03/23/2022     EGFRNONAA 28.1 (A) 03/23/2022            Lab Results   Component Value Date     WBC 6.86 03/23/2022     HGB 10.0 (L) 03/23/2022     HCT 33.1 (L) 03/23/2022     MCV 87 03/23/2022      03/23/2022      Prograf 6.8      Lab Results   Component Value Date    .0 (H) 06/04/2021    CALCIUM 9.3 07/06/2022    PHOS 2.4 (L) 07/06/2022       SPEP and UPEP unremarkable     Cardiac study: 1/18/22 reviewed:  Conclusion  · Planar imaging demonstrates cardiac activity that is absent to that of the adjacent rib cage.  · Study is negative for TTR amyloidosis with an uptake ratio of 1.03.  · Perugini Score of  0     Echo 7/27/21 reviewed:  · The left ventricle is normal in size with concentric hypertrophy and mildly decreased systolic function.  · The estimated ejection fraction is 40%.  · Normal right ventricular size with normal right ventricular systolic function.  · Indeterminate left ventricular diastolic function.  · Moderate left atrial enlargement.           IMPRESSION AND RECOMMENDATIONS:  65 y/o male with prior h/o of kidney Transplant present for post hospital f/u:     1. Renal: Cr tends to fluctuate, but overall slowly worsening  Reason for s Cr fluctuations is the use of diuretics and h/o of CHF. Pre-renal azotemia  OK to continue diuretics (despite changes in Cr) as pt will need diuretics for fluid balance  The dose of the diuretics should be proportional to the amount of fluid gain  CKD stage 3  Again advised pt of the reason for fluid control, and avoiding salty foods.  Advised pt to limit fluid intake so that he will need less diuretics, so that s Cr will go fluctuate so much     H/o of DM and HTN  Was on HD x 2.5 years before transplant     Complications of CKD:  K normal  Na normal  Acid base stable, metabolic alkalosis due to diuretics  Anemia, mild  Ca normal  PO4 stable  Secondary hyperparathyroidism, noted on both calcitriol and ergocalciferol, will d/c the latter    2. Immunosuppression  H/o of living related kidney transplant in 2015 form daughter  Prograf level within the therapeutic range  No change in prograf dose  Meds and doses reviewed with pt and his daughter     3. Cardiac: has diastolic and systolic CHF, worsened fluid gain  Worsened peripheral edema  Limit salt in diet <2-3 g/day  Limit fluids < 1.5 L/day  Continue lasix 80 mg po bid  Continue metolazone 2.5 mg po q week  Agree with cardiology mgmt     Previously cardiac amyloid was suspected, was ruled out by above cardiac test  SPEP and UPEP unremarkable, no monoclonality     4. HTN: BP normal to low  Meds reviewed      Plans and  recommendations:  As discussed above  Total time spent 40 minutes including time needed to review the records, the   patient evaluation, documentation, face-to-face discussion with the patient,   more than 50% of the time was spent on coordination of care and counseling.    Level V visit.  RTC 23months     Hany Gonsalez MD

## 2022-07-14 RX ORDER — LANOLIN ALCOHOL/MO/W.PET/CERES
400 CREAM (GRAM) TOPICAL DAILY
Qty: 90 TABLET | Refills: 1 | Status: SHIPPED | OUTPATIENT
Start: 2022-07-14 | End: 2023-02-06 | Stop reason: SDUPTHER

## 2022-08-01 ENCOUNTER — PATIENT MESSAGE (OUTPATIENT)
Dept: PHARMACY | Facility: CLINIC | Age: 67
End: 2022-08-01
Payer: COMMERCIAL

## 2022-08-09 RX ORDER — FUROSEMIDE 80 MG/1
TABLET ORAL
Qty: 30 TABLET | Refills: 11 | Status: SHIPPED | OUTPATIENT
Start: 2022-08-09 | End: 2023-05-25

## 2022-08-11 ENCOUNTER — OFFICE VISIT (OUTPATIENT)
Dept: CARDIOLOGY | Facility: CLINIC | Age: 67
End: 2022-08-11
Payer: COMMERCIAL

## 2022-08-11 VITALS
HEIGHT: 67 IN | OXYGEN SATURATION: 98 % | WEIGHT: 285.69 LBS | HEART RATE: 99 BPM | SYSTOLIC BLOOD PRESSURE: 132 MMHG | BODY MASS INDEX: 44.84 KG/M2 | DIASTOLIC BLOOD PRESSURE: 72 MMHG

## 2022-08-11 DIAGNOSIS — I12.9 HYPERTENSION ASSOCIATED WITH STAGE 3 CHRONIC KIDNEY DISEASE DUE TO TYPE 2 DIABETES MELLITUS: ICD-10-CM

## 2022-08-11 DIAGNOSIS — E11.21 TYPE 2 DIABETES MELLITUS WITH DIABETIC NEPHROPATHY, UNSPECIFIED WHETHER LONG TERM INSULIN USE: ICD-10-CM

## 2022-08-11 DIAGNOSIS — I25.10 CORONARY ARTERY DISEASE INVOLVING NATIVE CORONARY ARTERY OF NATIVE HEART WITHOUT ANGINA PECTORIS: ICD-10-CM

## 2022-08-11 DIAGNOSIS — E11.8 DM (DIABETES MELLITUS), TYPE 2 WITH COMPLICATIONS: ICD-10-CM

## 2022-08-11 DIAGNOSIS — I44.7 LBBB (LEFT BUNDLE BRANCH BLOCK): ICD-10-CM

## 2022-08-11 DIAGNOSIS — I25.118 CORONARY ARTERY DISEASE OF NATIVE ARTERY OF NATIVE HEART WITH STABLE ANGINA PECTORIS: ICD-10-CM

## 2022-08-11 DIAGNOSIS — Z86.718 HISTORY OF DVT (DEEP VEIN THROMBOSIS): ICD-10-CM

## 2022-08-11 DIAGNOSIS — N18.30 STAGE 3 CHRONIC KIDNEY DISEASE, UNSPECIFIED WHETHER STAGE 3A OR 3B CKD: ICD-10-CM

## 2022-08-11 DIAGNOSIS — N18.30 HYPERTENSION ASSOCIATED WITH STAGE 3 CHRONIC KIDNEY DISEASE DUE TO TYPE 2 DIABETES MELLITUS: ICD-10-CM

## 2022-08-11 DIAGNOSIS — N18.32 STAGE 3B CHRONIC KIDNEY DISEASE: ICD-10-CM

## 2022-08-11 DIAGNOSIS — I50.42 CHRONIC COMBINED SYSTOLIC AND DIASTOLIC CONGESTIVE HEART FAILURE: Primary | ICD-10-CM

## 2022-08-11 DIAGNOSIS — E66.01 CLASS 3 SEVERE OBESITY DUE TO EXCESS CALORIES WITH SERIOUS COMORBIDITY AND BODY MASS INDEX (BMI) OF 40.0 TO 44.9 IN ADULT: ICD-10-CM

## 2022-08-11 DIAGNOSIS — E11.22 HYPERTENSION ASSOCIATED WITH STAGE 3 CHRONIC KIDNEY DISEASE DUE TO TYPE 2 DIABETES MELLITUS: ICD-10-CM

## 2022-08-11 DIAGNOSIS — Z94.0 KIDNEY TRANSPLANT STATUS, LIVING RELATED DONOR: ICD-10-CM

## 2022-08-11 DIAGNOSIS — Z86.16 HISTORY OF COVID-19: ICD-10-CM

## 2022-08-11 PROCEDURE — 1101F PT FALLS ASSESS-DOCD LE1/YR: CPT | Mod: CPTII,S$GLB,, | Performed by: INTERNAL MEDICINE

## 2022-08-11 PROCEDURE — 3078F DIAST BP <80 MM HG: CPT | Mod: CPTII,S$GLB,, | Performed by: INTERNAL MEDICINE

## 2022-08-11 PROCEDURE — 4010F PR ACE/ARB THEARPY RXD/TAKEN: ICD-10-PCS | Mod: CPTII,S$GLB,, | Performed by: INTERNAL MEDICINE

## 2022-08-11 PROCEDURE — 3066F PR DOCUMENTATION OF TREATMENT FOR NEPHROPATHY: ICD-10-PCS | Mod: CPTII,S$GLB,, | Performed by: INTERNAL MEDICINE

## 2022-08-11 PROCEDURE — 1159F PR MEDICATION LIST DOCUMENTED IN MEDICAL RECORD: ICD-10-PCS | Mod: CPTII,S$GLB,, | Performed by: INTERNAL MEDICINE

## 2022-08-11 PROCEDURE — 3075F PR MOST RECENT SYSTOLIC BLOOD PRESS GE 130-139MM HG: ICD-10-PCS | Mod: CPTII,S$GLB,, | Performed by: INTERNAL MEDICINE

## 2022-08-11 PROCEDURE — 99214 OFFICE O/P EST MOD 30 MIN: CPT | Mod: S$GLB,,, | Performed by: INTERNAL MEDICINE

## 2022-08-11 PROCEDURE — 3061F PR NEG MICROALBUMINURIA RESULT DOCUMENTED/REVIEW: ICD-10-PCS | Mod: CPTII,S$GLB,, | Performed by: INTERNAL MEDICINE

## 2022-08-11 PROCEDURE — 3288F FALL RISK ASSESSMENT DOCD: CPT | Mod: CPTII,S$GLB,, | Performed by: INTERNAL MEDICINE

## 2022-08-11 PROCEDURE — 99214 PR OFFICE/OUTPT VISIT, EST, LEVL IV, 30-39 MIN: ICD-10-PCS | Mod: S$GLB,,, | Performed by: INTERNAL MEDICINE

## 2022-08-11 PROCEDURE — 3008F BODY MASS INDEX DOCD: CPT | Mod: CPTII,S$GLB,, | Performed by: INTERNAL MEDICINE

## 2022-08-11 PROCEDURE — 3066F NEPHROPATHY DOC TX: CPT | Mod: CPTII,S$GLB,, | Performed by: INTERNAL MEDICINE

## 2022-08-11 PROCEDURE — 99999 PR PBB SHADOW E&M-EST. PATIENT-LVL III: ICD-10-PCS | Mod: PBBFAC,,, | Performed by: INTERNAL MEDICINE

## 2022-08-11 PROCEDURE — 1160F RVW MEDS BY RX/DR IN RCRD: CPT | Mod: CPTII,S$GLB,, | Performed by: INTERNAL MEDICINE

## 2022-08-11 PROCEDURE — 3075F SYST BP GE 130 - 139MM HG: CPT | Mod: CPTII,S$GLB,, | Performed by: INTERNAL MEDICINE

## 2022-08-11 PROCEDURE — 3078F PR MOST RECENT DIASTOLIC BLOOD PRESSURE < 80 MM HG: ICD-10-PCS | Mod: CPTII,S$GLB,, | Performed by: INTERNAL MEDICINE

## 2022-08-11 PROCEDURE — 1159F MED LIST DOCD IN RCRD: CPT | Mod: CPTII,S$GLB,, | Performed by: INTERNAL MEDICINE

## 2022-08-11 PROCEDURE — 1101F PR PT FALLS ASSESS DOC 0-1 FALLS W/OUT INJ PAST YR: ICD-10-PCS | Mod: CPTII,S$GLB,, | Performed by: INTERNAL MEDICINE

## 2022-08-11 PROCEDURE — 99999 PR PBB SHADOW E&M-EST. PATIENT-LVL III: CPT | Mod: PBBFAC,,, | Performed by: INTERNAL MEDICINE

## 2022-08-11 PROCEDURE — 1126F PR PAIN SEVERITY QUANTIFIED, NO PAIN PRESENT: ICD-10-PCS | Mod: CPTII,S$GLB,, | Performed by: INTERNAL MEDICINE

## 2022-08-11 PROCEDURE — 1126F AMNT PAIN NOTED NONE PRSNT: CPT | Mod: CPTII,S$GLB,, | Performed by: INTERNAL MEDICINE

## 2022-08-11 PROCEDURE — 3288F PR FALLS RISK ASSESSMENT DOCUMENTED: ICD-10-PCS | Mod: CPTII,S$GLB,, | Performed by: INTERNAL MEDICINE

## 2022-08-11 PROCEDURE — 3044F HG A1C LEVEL LT 7.0%: CPT | Mod: CPTII,S$GLB,, | Performed by: INTERNAL MEDICINE

## 2022-08-11 PROCEDURE — 3061F NEG MICROALBUMINURIA REV: CPT | Mod: CPTII,S$GLB,, | Performed by: INTERNAL MEDICINE

## 2022-08-11 PROCEDURE — 3072F LOW RISK FOR RETINOPATHY: CPT | Mod: CPTII,S$GLB,, | Performed by: INTERNAL MEDICINE

## 2022-08-11 PROCEDURE — 3008F PR BODY MASS INDEX (BMI) DOCUMENTED: ICD-10-PCS | Mod: CPTII,S$GLB,, | Performed by: INTERNAL MEDICINE

## 2022-08-11 PROCEDURE — 3072F PR LOW RISK FOR RETINOPATHY: ICD-10-PCS | Mod: CPTII,S$GLB,, | Performed by: INTERNAL MEDICINE

## 2022-08-11 PROCEDURE — 4010F ACE/ARB THERAPY RXD/TAKEN: CPT | Mod: CPTII,S$GLB,, | Performed by: INTERNAL MEDICINE

## 2022-08-11 PROCEDURE — 3044F PR MOST RECENT HEMOGLOBIN A1C LEVEL <7.0%: ICD-10-PCS | Mod: CPTII,S$GLB,, | Performed by: INTERNAL MEDICINE

## 2022-08-11 PROCEDURE — 1160F PR REVIEW ALL MEDS BY PRESCRIBER/CLIN PHARMACIST DOCUMENTED: ICD-10-PCS | Mod: CPTII,S$GLB,, | Performed by: INTERNAL MEDICINE

## 2022-08-11 NOTE — PROGRESS NOTES
Subjective:   Patient ID:  Mitch Whittaker is a 66 y.o. male who presents for cardiac consult of Shortness of Breath and Follow-up      Follow-up  Associated symptoms include coughing. Pertinent negatives include no chest pain.   Shortness of Breath  Associated symptoms include leg swelling. Pertinent negatives include no chest pain.     The patient came in today for cardiac consult of Shortness of Breath and Follow-up      Mitch Whittaker is a 66 y.o. male with current medical conditions non obs CAD, h/o COVID 19, DVT on Eliquis, Obesity,  HFrEF 40-45%, CKD, DM2, MARION, HTN, HLD, ESRD s/p renal transplant presents for follow up CV eval.     3/15/19  Pt of Dr. Morris, last seen in clinic 2016. Pt presents after recent admission for CHF at [facility] -   Patient is a 63-year-old gentleman who presented to the hospital with shortness of breath. He was found to have pulmonary edema on chest x-ray and bilateral pedal edema. He was admitted for treatment of acute CHF. Echo showed reduced ejection fraction at 25-30%. He was treated with IV Lasix 80 mg twice daily that has been transitioned to Lasix p.o. Patient is not euvolemic and his creatinine is trending up for this reason I am cutting back on Lasix to 40 mg twice daily. Can follow-up with cardiology in 3 days. [facility] He was seen by Dr. Omayra Esparza from [facility]. Recommendation is to start him on Entresto once his renal functions stabilize.Pt had edema and SOB while in bed and then went to hospital. Prior to admission he was on lasix PRN. He will see Dr. Pacheco for nephro eval, transplant nephro Dr. Dejesus.  Has been feeling good on lasix 40 BID, has been walking well overall. Does not have CPAP machine.     5/13/19  He was started on Entresto after last visit as renal function improved/stabilized. Has seen Dr. Mason recently started on CPAP again. Toprol dec to 25 mg due to hypotension. Discussed will repeat echo in 1 month to evaluate LV  function, will refer for ICD if EF remains low. He woke up with right sided back pain. He has been using CPAP for about a little over a week. Has been doing well lately overall, BP well controlled, no STEELE/LE lately. PT has mild forgetfulness but family at home has been taking care of him closely.     7/5/19  ECHO last month EF improved to 45-50%. Will not need ICD now. He has LLE pain x 4 days. He has been using Tylenol and ICY HOT for pain relief. Pain started L calf. Is taking lasix 40mg daily. Feels dizzy and lightheaded with standing at times.     7/24/19  Last week - DVT noted on u/s. Started Eliquis 10 BID x 7 days and Eliquis 5 BID after. NO bleeding issues lately with Eliquis, had minor blood after insulin shot which resolved with pressure. He has been walking more lately. Late June he had leg pain and was not walking much since, he has a habit of sitting down for a long period of time. He is on Eliquis 5 BID dosing now.     10/11/19  His recent LE u/s with non occlusive thrombus in PTV in left but no thrombus in POP vein on left. He still feels L leg swelling. No SOB. Has been using CPAP machine regularly. He feels dizzy at times and when he gets up more lightheaded, discussed needs to eval with urologist to stop flomax/proscar.     1/13/20  Last u/s with non occ thrombus L posterior Tib vein. He has been having back pain recently. He has intermittent pain when he gets up and walks around. He has started walking more and started to ride. He had had a bad sinus infection/URI and was given PCN and cough syrup.     7/15/20  Breathing overall stable but has this chronic dry cough. He has been cutting grass and felt like he had a heat stroke/exhaustion. He did not need to go to ER. His legs had some swelling but improved with rest/reclining. He has been painting and moving things around the house. No CP/SOB. He has gained weight recently as well.     1/8/2021  He has gained more weight since last visit. He has  "been eating too much lately. He got a new exercise bike. Discussed will refer to bariatric surgery.   ECG - sinus tachy, LAE, LBBB    7/16/21  BP and Hr stable. Weight 295 lbs. He had a fall a month ago trying to take out  and scrape his knee. He has more STEELE and edema. He has been drinking more water he cannot help it at times.       Hospital Course:   12/7 admitted for atypical chest pain and angelito. CP resolved. Endorses constipation taking MOM. Reports "rabbit" -like stool. Also reports taking lasix daily and was instructed by PcP to taking QOD for chf.  H/o renal transplant. Reports compliance to meds. Last seen by primary nephrologist, Dr. Pacheco. States reason for renal failure from uncontrolled BP. Also with DM. Was previously on dialysis prior to transplant. Reports urination frequency and amount unchanged. Renal u/s with no significant change, no hydronephrosis. Cr closer to baseline, now 2.7. avoid nephrotoxic agents and resume immunosuppressants. Tac lvl 10.5  12/8   Renal function improved, to baseline. After discussing with nephrology, ok to d/c and follow up o/p with medication adjustment for tacrolimus.  Endorses constipation with associated left upper abdominal quad pain. Reports passing flatus. Advised to continue bowel regimen and try to avoid dehydration.  Lesions on leg d/t chronic venous stasis. Wound care as o/p with homehealth.  He also saw Dr. Hill for possible amyloid workup and treatment.     12/29/21  ECHO in July with mild dec EF 40%. Increased Entersto dose last visit. He was admitted for CP, ANGELITO. He feels well now, stable. Takes metoloazone daily but at night discussed to take in Am, and takes lasix 40 BID.     2/10/22  Study is negative for TTR amyloidosis with an uptake ratio of 1.03. Per Dr. Hill - I would recommend strain imaging on repeat echo  If Hematologist is concerned re: AL then heart biopsy indicated unless other less invasive measures demonstrate " amyloidosis.    He had an episode of syncope last week while he got out of car, did not go to ER. He regained conc and was with daughter. Has been having headaches at times as well.     8/11/22  ECHO 2/2022 with EF 40%, normal RV function, PASP 27 mmHg. He had ER eval in May for CP, ruled out and sent home. BP and HR well controlled. BMI 44 - 285 lbs. He has occ CP/SOB since having COVID in Jan. He saw Jaymie May a few months ago, stable overall, is on metolazone 2.5 mg on Tuesday and Fridays for now    Patient feels no chest pain, no PND, no palpitation,  no syncope, no CNS symptoms.    Patient has dec exercise tolerance.    Patient is compliant with medications.    Results for orders placed during the hospital encounter of 02/18/22    Echo    Interpretation Summary  · Concentric hypertrophy and mildly decreased systolic function.  · Mild tricuspid regurgitation.  · Mild left atrial enlargement.  · The estimated ejection fraction is 40%.  · Left ventricular diastolic dysfunction.  · There is abnormal septal wall motion consistent with left bundle branch block.  · With normal right ventricular systolic function.  · Normal central venous pressure (3 mmHg).  · The estimated PA systolic pressure is 27 mmHg.      Results for orders placed during the hospital encounter of 07/27/21    Echo    Interpretation Summary  · The left ventricle is normal in size with concentric hypertrophy and mildly decreased systolic function.  · The estimated ejection fraction is 40%.  · Normal right ventricular size with normal right ventricular systolic function.  · Indeterminate left ventricular diastolic function.  · Moderate left atrial enlargement.      [facility] 12/2019  Age Indeterminate non occlusive thrombus of the Posterior Tibial Vein.  CONCLUSIONS   Technically difficult study.   This document has been electronically    SIGNED BY: Fabiana Saleem MD On: 12/18/2019 17:18    RIGHT:  No evidence of Right lower extremity DVT.    LEFT:  Age  Indeterminate DVT of the Posterior Tibial Vein.  In comparison to LEV done 7/19 there is no longer thrombus in the POP vein on the left, but there still appears to be non occlusive thrombus seen in the PTV on the left.  CONCLUSIONS   Technically difficult study.   This document has been electronically    SIGNED BY: Darius Azevedo MD On: 09/23/2019 17:20      Nuclear Stress 06/20/2018   Nuclear Quantitative Functional Analysis:   LVEF: 46 %  LVED Volume: 144 ml  LVES Volume: 91 ml    Impression: NORMAL MYOCARDIAL PERFUSION  1. The perfusion scan is free of evidence for myocardial ischemia or injury.   2. Resting wall motion is physiologic.   3. There is resting LV dysfunction with a reduced ejection fraction of 46 %.   4. The ventricular volumes are normal at rest and stress.   5. The extracardiac distribution of radioactivity is normal.       Past Medical History:   Diagnosis Date    Acquired renal cyst of left kidney     Anemia associated with chronic renal failure     CAD (coronary artery disease)     nonobstructive c 9/14    CHF (congestive heart failure)     Chronic immunosuppression with Prograf and MMF 06/18/2015    Chronic venous insufficiency of lower extremity     CKD (chronic kidney disease) stage 3, GFR 30-59 ml/min     Diabetic retinopathy     DM (diabetes mellitus), type 2 with complications 1994    Edema     End stage kidney disease     s/p transplant, doing well    Gallbladder polyp     Heart failure, diastolic, due to HTN     Hemodialysis status     off since transplant    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 RNA NEGATIVE 5/2015, 2021    History of colon polyps     HPTH (hyperparathyroidism)     Hyperlipidemia     Hypertension associated with stage 3 chronic kidney disease due to type 2 diabetes mellitus     LBBB (left bundle branch block) 12/20/2021    Morbid obesity with BMI of 45.0-49.9, adult     PCO (posterior capsular opacification), left  "2019    Proteinuria     resolved s/p transplant    S/P kidney transplant     Sleep apnea     Type 2 diabetes, uncontrolled, with retinopathy     Type II diabetes mellitus with renal manifestations        Past Surgical History:   Procedure Laterality Date    CARDIAC CATHETERIZATION      normal coronary    COLONOSCOPY N/A 2018    Procedure: COLONOSCOPY;  Surgeon: Chava Ronquillo MD;  Location: [facility] ENDO;  Service: Endoscopy;  Laterality: N/A;    COLONOSCOPY N/A 2022    Procedure: COLONOSCOPY;  Surgeon: Alix Puente MD;  Location: [facility] ENDO;  Service: Endoscopy;  Laterality: N/A;    KIDNEY TRANSPLANT      RETINAL LASER PROCEDURE         Social History     Tobacco Use    Smoking status: Former Smoker     Quit date: 2013     Years since quittin.2    Smokeless tobacco: Former User     Quit date: 2013    Tobacco comment: used marijuana since 0946-5823, stopped after started dialysis   Substance Use Topics    Alcohol use: No     Alcohol/week: 0.0 standard drinks    Drug use: No     Comment:         Family History   Problem Relation Age of Onset    Diabetes Mother     Hypertension Mother     Heart failure Mother     Kidney disease Sister         ESRD    Diabetes Sister     Diabetes Maternal Grandmother     Cancer Neg Hx        Patient's Medications   New Prescriptions    No medications on file   Previous Medications    APIXABAN (ELIQUIS) 5 MG TAB    Take 1 tablet (5 mg total) by mouth 2 (two) times daily.    ASPIRIN (ECOTRIN) 81 MG EC TABLET    Take 1 tablet by mouth Daily.     BD ULTRA-FINE MINI PEN NEEDLE 31 GAUGE X 3/16" NDLE    Use to inject insulin as needed up to 4 times daily    BLOOD SUGAR DIAGNOSTIC STRP    Use to test four times per day    CALCITRIOL (ROCALTROL) 0.25 MCG CAP    Take 1 capsule (0.25 mcg total) by mouth once daily.    FAMOTIDINE (PEPCID) 20 MG TABLET    TAKE ONE TABLET BY MOUTH EVERY EVENING    FISH OIL-OMEGA-3 FATTY ACIDS 300-1,000 MG " "CAPSULE    Take 1 g by mouth once daily.    FUROSEMIDE (LASIX) 80 MG TABLET    Take one-half tablet (40 mg) by mouth 2 (two) times daily.    GLIMEPIRIDE (AMARYL) 2 MG TABLET    Take 1 tablet (2 mg total) by mouth before breakfast.    INSULIN (LANTUS SOLOSTAR U-100 INSULIN) GLARGINE 100 UNITS/ML SUBQ PEN    Inject 35 Units into the skin 2 (two) times daily.    INSULIN ASPART U-100 (NOVOLOG FLEXPEN U-100 INSULIN) 100 UNIT/ML (3 ML) INPN PEN    Inject 25 Units into the skin 3 (three) times daily with meals.    KETOCONAZOLE (NIZORAL) 200 MG TAB    Take HALF A tablet (100 mg total) by mouth once daily.    LANCETS 33 GAUGE MISC    1 Stick by Misc.(Non-Drug; Combo Route) route as directed. Contour lancets. Use to check BG 2-3 times daily.    MAGNESIUM OXIDE (MAG-OX) 400 MG (241.3 MG MAGNESIUM) TABLET    Take 1 tablet (400 mg total) by mouth once daily.    METOLAZONE (ZAROXOLYN) 2.5 MG TABLET    Take 1 tablet by mouth on Monday and Friday as directed    METOPROLOL SUCCINATE (TOPROL-XL) 25 MG 24 HR TABLET    Take 1 tablet (25 mg total) by mouth once daily.    MULTIVITAMIN (THERAGRAN) TABLET    Take 1 tablet by mouth once daily.    MUPIROCIN (BACTROBAN) 2 % OINTMENT    Apply topically 3 (three) times daily. Use as directed on the leg wound.    MYCOPHENOLATE (CELLCEPT) 250 MG CAP    Take 3 capsules (750 mg total) by mouth 2 (two) times daily.    ONDANSETRON (ZOFRAN) 4 MG TABLET    Take 1 tablet (4 mg total) by mouth every 6 (six) hours.    PEN NEEDLE, DIABETIC (BD INSULIN PEN NEEDLE UF SHORT) 31 GAUGE X 5/16" NDLE    USE TO INJECT INSULIN TWICE A DAY    POTASSIUM CHLORIDE SA (K-DUR,KLOR-CON) 20 MEQ TABLET    Take 2 tablets (40 mEq total) by mouth once daily.    PREDNISONE (DELTASONE) 5 MG TABLET    Take 1 tablet (5 mg total) by mouth once daily.    PROMETHAZINE-DEXTROMETHORPHAN (PROMETHAZINE-DM) 6.25-15 MG/5 ML SYRP    Take by mouth.    ROSUVASTATIN (CRESTOR) 40 MG TAB    Take 1 tablet (40 mg total) by mouth once daily in the " "evening.    SACUBITRIL-VALSARTAN (ENTRESTO)  MG PER TABLET    Take 1 tablet by mouth 2 (two) times daily.    TACROLIMUS (PROGRAF) 1 MG CAP    Take 4 capsules (4 mg total) by mouth every 12 (twelve) hours.    TAMSULOSIN (FLOMAX) 0.4 MG CAP    Take 1 capsule (0.4 mg total) by mouth once daily.   Modified Medications    No medications on file   Discontinued Medications    No medications on file       Review of Systems   Constitutional: Positive for malaise/fatigue.   HENT: Negative.    Eyes: Negative.    Respiratory: Positive for cough and shortness of breath.    Cardiovascular: Positive for leg swelling. Negative for chest pain and palpitations.   Gastrointestinal: Negative.    Genitourinary: Negative.    Musculoskeletal: Positive for back pain.   Skin: Negative.    Neurological: Positive for dizziness.   Endo/Heme/Allergies: Negative.    Psychiatric/Behavioral: Negative.    All 12 systems otherwise negative.      Wt Readings from Last 3 Encounters:   08/11/22 129.6 kg (285 lb 11.5 oz)   07/11/22 130.5 kg (287 lb 11.2 oz)   05/02/22 129.3 kg (285 lb)     Temp Readings from Last 3 Encounters:   05/18/22 98.8 °F (37.1 °C)   05/02/22 98.4 °F (36.9 °C)   03/20/22 98.1 °F (36.7 °C) (Oral)     BP Readings from Last 3 Encounters:   08/11/22 132/72   07/11/22 110/60   05/18/22 122/74     Pulse Readings from Last 3 Encounters:   08/11/22 99   07/11/22 100   05/18/22 91       /72 (BP Location: Right arm, Patient Position: Sitting, BP Method: Large (Manual))   Pulse 99   Ht 5' 7" (1.702 m)   Wt 129.6 kg (285 lb 11.5 oz)   SpO2 98%   BMI 44.75 kg/m²     Objective:   Physical Exam  Vitals and nursing note reviewed.   Constitutional:       General: He is not in acute distress.     Appearance: He is well-developed. He is not diaphoretic.   HENT:      Head: Normocephalic and atraumatic.      Nose: Nose normal.   Eyes:      General: No scleral icterus.     Conjunctiva/sclera: Conjunctivae normal.   Neck:      Thyroid: " No thyromegaly.      Vascular: No JVD.   Cardiovascular:      Rate and Rhythm: Normal rate and regular rhythm.      Heart sounds: S1 normal and S2 normal. No murmur heard.    No friction rub. No gallop. No S3 or S4 sounds.   Pulmonary:      Effort: Pulmonary effort is normal. No respiratory distress.      Breath sounds: Normal breath sounds. No stridor. No wheezing or rales.   Chest:      Chest wall: No tenderness.   Abdominal:      General: Bowel sounds are normal. There is no distension.      Palpations: Abdomen is soft. There is no mass.      Tenderness: There is no abdominal tenderness. There is no rebound.   Genitourinary:     Comments: Deferred  Musculoskeletal:         General: No tenderness or deformity. Normal range of motion.      Cervical back: Normal range of motion and neck supple.      Right lower leg: Edema present.      Left lower leg: Edema present.   Lymphadenopathy:      Cervical: No cervical adenopathy.   Skin:     General: Skin is warm and dry.      Coloration: Skin is not pale.      Findings: No erythema or rash.   Neurological:      Mental Status: He is alert and oriented to person, place, and time.      Motor: No abnormal muscle tone.      Coordination: Coordination normal.   Psychiatric:         Behavior: Behavior normal.         Thought Content: Thought content normal.         Judgment: Judgment normal.         Lab Results   Component Value Date     07/06/2022    K 3.6 07/06/2022    CL 99 07/06/2022    CO2 28 07/06/2022    BUN 56 (H) 07/06/2022    CREATININE 3.1 (H) 07/06/2022     (H) 07/06/2022    HGBA1C 6.8 (H) 03/11/2022    MG 2.1 03/03/2022    AST 21 05/18/2022    ALT 13 05/18/2022    ALBUMIN 3.5 07/06/2022    PROT 7.1 05/18/2022    BILITOT 0.5 05/18/2022    WBC 7.09 07/06/2022    HGB 10.7 (L) 07/06/2022    HCT 35.9 (L) 07/06/2022    HCT 36 06/12/2015    MCV 88 07/06/2022     07/06/2022    INR 1.0 06/18/2015    TSH 0.999 03/11/2022    CHOL 185 03/11/2022    HDL 49  03/11/2022    LDLCALC 111.0 03/11/2022    TRIG 125 03/11/2022    BNP 41 05/18/2022     Assessment:      1. Chronic combined systolic and diastolic congestive heart failure    2. LBBB (left bundle branch block)    3. Coronary artery disease of native artery of native heart with stable angina pectoris    4. Hypertension associated with stage 3 chronic kidney disease due to type 2 diabetes mellitus    5. Stage 3b chronic kidney disease    6. DM (diabetes mellitus), type 2 with complications    7. Class 3 severe obesity due to excess calories with serious comorbidity and body mass index (BMI) of 40.0 to 44.9 in adult    8. Coronary artery disease involving native coronary artery of native heart without angina pectoris    9. Stage 3 chronic kidney disease, unspecified whether stage 3a or 3b CKD    10. History of DVT (deep vein thrombosis)    11. Type 2 diabetes mellitus with diabetic nephropathy, unspecified whether long term insulin use    12. Living-related donor kidney transplant (daughter) - 6/12/15        Plan:    1. Chronic CHF EF 40-45% ; neg for TTR amyloidosis  - cont lasix, and extra PRN; metolazone 2.5 mg Mon and Fri  - titrate Toprol and Entresto;  - cont low salt diet, fluid restriction  - increase Entresto to max dose  - ECHO 2/2022 with EF 40%, normal RV function, PASP 27 mmHg.     2. HTN with mild edema; low normal BP   - cont meds for afterload reduction   - low salt diet  - rec compression stockings  - stop hydralazine     3. HLD  - cont statin    4. ESRD s/p Transplant with CKD  - f/u with nephro, repeat labs and adjust tx per nephro    5. CAD, non obs  - cont meds  - stress 6/2018 negative    6. MARION  -cont CPAP    7. Obesity BMI 44 - 285 lbs.   - rec weight loss with diet/exercise     8. H/o DVT  - cont Eliquis 5 BID  - non occlusive thrombus in PTV in left but no thrombus in POP vein on left.    9. Dizziness, had syncope recently with headaches  - refer to Neuro   - likeky sec to Flomax and Proscar,  can discuss with urology  - monitor BP as well    10. DM2 - A1c -9.3 --> 8.0 --> 7.5 --> 6.8  - cont meds per PCP, improving towards goal    11. H/O COVID 19  - long covid sx now  - cont supportive tx  - need Booster       Thank you for allowing me to participate in this patient's care. Please do not hesitate to contact me with any questions or concerns. Consult note has been forwarded to the referral physician.

## 2022-08-17 ENCOUNTER — IMMUNIZATION (OUTPATIENT)
Dept: PRIMARY CARE CLINIC | Facility: CLINIC | Age: 67
End: 2022-08-17
Payer: COMMERCIAL

## 2022-08-17 DIAGNOSIS — Z23 NEED FOR VACCINATION: Primary | ICD-10-CM

## 2022-08-17 PROCEDURE — 0054A COVID-19, MRNA, LNP-S, PF, 30 MCG/0.3 ML DOSE VACCINE (PFIZER): CPT | Mod: CV19,PBBFAC | Performed by: FAMILY MEDICINE

## 2022-08-25 DIAGNOSIS — I50.42 CHRONIC COMBINED SYSTOLIC AND DIASTOLIC CONGESTIVE HEART FAILURE: ICD-10-CM

## 2022-08-25 RX ORDER — SACUBITRIL AND VALSARTAN 97; 103 MG/1; MG/1
1 TABLET, FILM COATED ORAL 2 TIMES DAILY
Qty: 60 TABLET | Refills: 3 | Status: SHIPPED | OUTPATIENT
Start: 2022-08-25 | End: 2022-12-29 | Stop reason: SDUPTHER

## 2022-09-19 ENCOUNTER — LAB VISIT (OUTPATIENT)
Dept: LAB | Facility: HOSPITAL | Age: 67
End: 2022-09-19
Attending: INTERNAL MEDICINE
Payer: COMMERCIAL

## 2022-09-19 DIAGNOSIS — E11.8 DM (DIABETES MELLITUS), TYPE 2 WITH COMPLICATIONS: ICD-10-CM

## 2022-09-19 LAB
ALBUMIN SERPL BCP-MCNC: 3.2 G/DL (ref 3.5–5.2)
ALP SERPL-CCNC: 52 U/L (ref 55–135)
ALT SERPL W/O P-5'-P-CCNC: 11 U/L (ref 10–44)
ANION GAP SERPL CALC-SCNC: 11 MMOL/L (ref 8–16)
AST SERPL-CCNC: 13 U/L (ref 10–40)
BASOPHILS # BLD AUTO: 0.02 K/UL (ref 0–0.2)
BASOPHILS NFR BLD: 0.3 % (ref 0–1.9)
BILIRUB SERPL-MCNC: 0.5 MG/DL (ref 0.1–1)
BUN SERPL-MCNC: 39 MG/DL (ref 8–23)
CALCIUM SERPL-MCNC: 9.6 MG/DL (ref 8.7–10.5)
CHLORIDE SERPL-SCNC: 98 MMOL/L (ref 95–110)
CHOLEST SERPL-MCNC: 183 MG/DL (ref 120–199)
CHOLEST/HDLC SERPL: 4 {RATIO} (ref 2–5)
CO2 SERPL-SCNC: 31 MMOL/L (ref 23–29)
CREAT SERPL-MCNC: 2.9 MG/DL (ref 0.5–1.4)
DIFFERENTIAL METHOD: ABNORMAL
EOSINOPHIL # BLD AUTO: 0 K/UL (ref 0–0.5)
EOSINOPHIL NFR BLD: 0.4 % (ref 0–8)
ERYTHROCYTE [DISTWIDTH] IN BLOOD BY AUTOMATED COUNT: 12.8 % (ref 11.5–14.5)
EST. GFR  (NO RACE VARIABLE): 22.8 ML/MIN/1.73 M^2
ESTIMATED AVG GLUCOSE: 157 MG/DL (ref 68–131)
GLUCOSE SERPL-MCNC: 116 MG/DL (ref 70–110)
HBA1C MFR BLD: 7.1 % (ref 4–5.6)
HCT VFR BLD AUTO: 34.4 % (ref 40–54)
HDLC SERPL-MCNC: 46 MG/DL (ref 40–75)
HDLC SERPL: 25.1 % (ref 20–50)
HGB BLD-MCNC: 9.9 G/DL (ref 14–18)
IMM GRANULOCYTES # BLD AUTO: 0.08 K/UL (ref 0–0.04)
IMM GRANULOCYTES NFR BLD AUTO: 1.1 % (ref 0–0.5)
LDLC SERPL CALC-MCNC: 114.4 MG/DL (ref 63–159)
LYMPHOCYTES # BLD AUTO: 1 K/UL (ref 1–4.8)
LYMPHOCYTES NFR BLD: 13.9 % (ref 18–48)
MCH RBC QN AUTO: 25.7 PG (ref 27–31)
MCHC RBC AUTO-ENTMCNC: 28.8 G/DL (ref 32–36)
MCV RBC AUTO: 89 FL (ref 82–98)
MONOCYTES # BLD AUTO: 0.7 K/UL (ref 0.3–1)
MONOCYTES NFR BLD: 9.8 % (ref 4–15)
NEUTROPHILS # BLD AUTO: 5.3 K/UL (ref 1.8–7.7)
NEUTROPHILS NFR BLD: 74.5 % (ref 38–73)
NONHDLC SERPL-MCNC: 137 MG/DL
NRBC BLD-RTO: 0 /100 WBC
PLATELET # BLD AUTO: 204 K/UL (ref 150–450)
PMV BLD AUTO: 11.6 FL (ref 9.2–12.9)
POTASSIUM SERPL-SCNC: 3.6 MMOL/L (ref 3.5–5.1)
PROT SERPL-MCNC: 6.5 G/DL (ref 6–8.4)
RBC # BLD AUTO: 3.85 M/UL (ref 4.6–6.2)
SODIUM SERPL-SCNC: 140 MMOL/L (ref 136–145)
TRIGL SERPL-MCNC: 113 MG/DL (ref 30–150)
WBC # BLD AUTO: 7.07 K/UL (ref 3.9–12.7)

## 2022-09-19 PROCEDURE — 85025 COMPLETE CBC W/AUTO DIFF WBC: CPT | Performed by: INTERNAL MEDICINE

## 2022-09-19 PROCEDURE — 80053 COMPREHEN METABOLIC PANEL: CPT | Performed by: INTERNAL MEDICINE

## 2022-09-19 PROCEDURE — 80061 LIPID PANEL: CPT | Performed by: INTERNAL MEDICINE

## 2022-09-19 PROCEDURE — 83036 HEMOGLOBIN GLYCOSYLATED A1C: CPT | Performed by: INTERNAL MEDICINE

## 2022-09-19 PROCEDURE — 36415 COLL VENOUS BLD VENIPUNCTURE: CPT | Mod: PO | Performed by: INTERNAL MEDICINE

## 2022-10-03 ENCOUNTER — OFFICE VISIT (OUTPATIENT)
Dept: INTERNAL MEDICINE | Facility: CLINIC | Age: 67
End: 2022-10-03
Payer: COMMERCIAL

## 2022-10-03 VITALS
HEART RATE: 105 BPM | HEIGHT: 67 IN | RESPIRATION RATE: 20 BRPM | WEIGHT: 275.81 LBS | TEMPERATURE: 98 F | BODY MASS INDEX: 43.29 KG/M2 | SYSTOLIC BLOOD PRESSURE: 110 MMHG | DIASTOLIC BLOOD PRESSURE: 56 MMHG | OXYGEN SATURATION: 99 %

## 2022-10-03 DIAGNOSIS — R05.8 ALLERGIC COUGH: ICD-10-CM

## 2022-10-03 DIAGNOSIS — Z23 IMMUNIZATION DUE: ICD-10-CM

## 2022-10-03 DIAGNOSIS — Z00.00 ANNUAL PHYSICAL EXAM: Primary | ICD-10-CM

## 2022-10-03 DIAGNOSIS — Z79.4 UNCONTROLLED TYPE 2 DIABETES MELLITUS WITH HYPERGLYCEMIA, WITH LONG-TERM CURRENT USE OF INSULIN: ICD-10-CM

## 2022-10-03 DIAGNOSIS — L98.9 SKIN LESIONS: ICD-10-CM

## 2022-10-03 DIAGNOSIS — E11.65 UNCONTROLLED TYPE 2 DIABETES MELLITUS WITH HYPERGLYCEMIA, WITH LONG-TERM CURRENT USE OF INSULIN: ICD-10-CM

## 2022-10-03 DIAGNOSIS — I50.42 CHRONIC COMBINED SYSTOLIC AND DIASTOLIC CHF (CONGESTIVE HEART FAILURE): ICD-10-CM

## 2022-10-03 DIAGNOSIS — Z94.0 KIDNEY TRANSPLANTED: ICD-10-CM

## 2022-10-03 PROCEDURE — 1159F PR MEDICATION LIST DOCUMENTED IN MEDICAL RECORD: ICD-10-PCS | Mod: CPTII,S$GLB,, | Performed by: INTERNAL MEDICINE

## 2022-10-03 PROCEDURE — 1125F AMNT PAIN NOTED PAIN PRSNT: CPT | Mod: CPTII,S$GLB,, | Performed by: INTERNAL MEDICINE

## 2022-10-03 PROCEDURE — 1101F PT FALLS ASSESS-DOCD LE1/YR: CPT | Mod: CPTII,S$GLB,, | Performed by: INTERNAL MEDICINE

## 2022-10-03 PROCEDURE — 1160F RVW MEDS BY RX/DR IN RCRD: CPT | Mod: CPTII,S$GLB,, | Performed by: INTERNAL MEDICINE

## 2022-10-03 PROCEDURE — 3051F PR MOST RECENT HEMOGLOBIN A1C LEVEL 7.0 - < 8.0%: ICD-10-PCS | Mod: CPTII,S$GLB,, | Performed by: INTERNAL MEDICINE

## 2022-10-03 PROCEDURE — 90471 FLU VACCINE - QUADRIVALENT - ADJUVANTED: ICD-10-PCS | Mod: S$GLB,,, | Performed by: INTERNAL MEDICINE

## 2022-10-03 PROCEDURE — 3078F PR MOST RECENT DIASTOLIC BLOOD PRESSURE < 80 MM HG: ICD-10-PCS | Mod: CPTII,S$GLB,, | Performed by: INTERNAL MEDICINE

## 2022-10-03 PROCEDURE — 3061F NEG MICROALBUMINURIA REV: CPT | Mod: CPTII,S$GLB,, | Performed by: INTERNAL MEDICINE

## 2022-10-03 PROCEDURE — 3074F SYST BP LT 130 MM HG: CPT | Mod: CPTII,S$GLB,, | Performed by: INTERNAL MEDICINE

## 2022-10-03 PROCEDURE — 1125F PR PAIN SEVERITY QUANTIFIED, PAIN PRESENT: ICD-10-PCS | Mod: CPTII,S$GLB,, | Performed by: INTERNAL MEDICINE

## 2022-10-03 PROCEDURE — 90694 FLU VACCINE - QUADRIVALENT - ADJUVANTED: ICD-10-PCS | Mod: S$GLB,,, | Performed by: INTERNAL MEDICINE

## 2022-10-03 PROCEDURE — 3074F PR MOST RECENT SYSTOLIC BLOOD PRESSURE < 130 MM HG: ICD-10-PCS | Mod: CPTII,S$GLB,, | Performed by: INTERNAL MEDICINE

## 2022-10-03 PROCEDURE — 1159F MED LIST DOCD IN RCRD: CPT | Mod: CPTII,S$GLB,, | Performed by: INTERNAL MEDICINE

## 2022-10-03 PROCEDURE — 99999 PR PBB SHADOW E&M-EST. PATIENT-LVL V: CPT | Mod: PBBFAC,,, | Performed by: INTERNAL MEDICINE

## 2022-10-03 PROCEDURE — 3051F HG A1C>EQUAL 7.0%<8.0%: CPT | Mod: CPTII,S$GLB,, | Performed by: INTERNAL MEDICINE

## 2022-10-03 PROCEDURE — 3061F PR NEG MICROALBUMINURIA RESULT DOCUMENTED/REVIEW: ICD-10-PCS | Mod: CPTII,S$GLB,, | Performed by: INTERNAL MEDICINE

## 2022-10-03 PROCEDURE — 3072F LOW RISK FOR RETINOPATHY: CPT | Mod: CPTII,S$GLB,, | Performed by: INTERNAL MEDICINE

## 2022-10-03 PROCEDURE — 99999 PR PBB SHADOW E&M-EST. PATIENT-LVL V: ICD-10-PCS | Mod: PBBFAC,,, | Performed by: INTERNAL MEDICINE

## 2022-10-03 PROCEDURE — 4010F PR ACE/ARB THEARPY RXD/TAKEN: ICD-10-PCS | Mod: CPTII,S$GLB,, | Performed by: INTERNAL MEDICINE

## 2022-10-03 PROCEDURE — 99214 OFFICE O/P EST MOD 30 MIN: CPT | Mod: 25,S$GLB,, | Performed by: INTERNAL MEDICINE

## 2022-10-03 PROCEDURE — 3072F PR LOW RISK FOR RETINOPATHY: ICD-10-PCS | Mod: CPTII,S$GLB,, | Performed by: INTERNAL MEDICINE

## 2022-10-03 PROCEDURE — 90694 VACC AIIV4 NO PRSRV 0.5ML IM: CPT | Mod: S$GLB,,, | Performed by: INTERNAL MEDICINE

## 2022-10-03 PROCEDURE — 1160F PR REVIEW ALL MEDS BY PRESCRIBER/CLIN PHARMACIST DOCUMENTED: ICD-10-PCS | Mod: CPTII,S$GLB,, | Performed by: INTERNAL MEDICINE

## 2022-10-03 PROCEDURE — 3288F PR FALLS RISK ASSESSMENT DOCUMENTED: ICD-10-PCS | Mod: CPTII,S$GLB,, | Performed by: INTERNAL MEDICINE

## 2022-10-03 PROCEDURE — 1101F PR PT FALLS ASSESS DOC 0-1 FALLS W/OUT INJ PAST YR: ICD-10-PCS | Mod: CPTII,S$GLB,, | Performed by: INTERNAL MEDICINE

## 2022-10-03 PROCEDURE — 3078F DIAST BP <80 MM HG: CPT | Mod: CPTII,S$GLB,, | Performed by: INTERNAL MEDICINE

## 2022-10-03 PROCEDURE — 4010F ACE/ARB THERAPY RXD/TAKEN: CPT | Mod: CPTII,S$GLB,, | Performed by: INTERNAL MEDICINE

## 2022-10-03 PROCEDURE — 3066F NEPHROPATHY DOC TX: CPT | Mod: CPTII,S$GLB,, | Performed by: INTERNAL MEDICINE

## 2022-10-03 PROCEDURE — 90471 IMMUNIZATION ADMIN: CPT | Mod: S$GLB,,, | Performed by: INTERNAL MEDICINE

## 2022-10-03 PROCEDURE — 3008F PR BODY MASS INDEX (BMI) DOCUMENTED: ICD-10-PCS | Mod: CPTII,S$GLB,, | Performed by: INTERNAL MEDICINE

## 2022-10-03 PROCEDURE — 3066F PR DOCUMENTATION OF TREATMENT FOR NEPHROPATHY: ICD-10-PCS | Mod: CPTII,S$GLB,, | Performed by: INTERNAL MEDICINE

## 2022-10-03 PROCEDURE — 3008F BODY MASS INDEX DOCD: CPT | Mod: CPTII,S$GLB,, | Performed by: INTERNAL MEDICINE

## 2022-10-03 PROCEDURE — 99214 PR OFFICE/OUTPT VISIT, EST, LEVL IV, 30-39 MIN: ICD-10-PCS | Mod: 25,S$GLB,, | Performed by: INTERNAL MEDICINE

## 2022-10-03 PROCEDURE — 3288F FALL RISK ASSESSMENT DOCD: CPT | Mod: CPTII,S$GLB,, | Performed by: INTERNAL MEDICINE

## 2022-10-03 RX ORDER — MONTELUKAST SODIUM 10 MG/1
10 TABLET ORAL NIGHTLY
Qty: 30 TABLET | Refills: 1 | Status: SHIPPED | OUTPATIENT
Start: 2022-10-03 | End: 2022-11-28 | Stop reason: SDUPTHER

## 2022-10-03 NOTE — PROGRESS NOTES
"  Mitch Whittaker  10/03/2022  Chief Complaint: " A full work up "  Principle Problem Cough; hemorrhoid management; wound check; lightheadedness    History     HPI  Mitch Whittaker is a 66 y.o. male with a PMHx inclusive of CHF, T2DM and s/p renal transplant in 2015 who presents with 4 complaints: cough, hemorrhoids, a wound check, and "lightheadedness that comes on when I stand up".      The cough has lasted approx 10 months and is productive of light yellow/clear sputum of low volume. There is associated rhinorrhea, eye redness and dyspnea, but denies ever coughing up blood, hx of dx lung disease or wheezing. Not associated with fevers. Is on a ARB as part of his BP regime (entresto). Unclear smoking hx of at least 3 years of cannabis--1 joint/day.     "Hemorrhoids" discovered during his last colonoscopy. Describes a burning sensation of his rectum after wiping from multiple visits to the bathroom. Duration of symptoms unclear. He describes this as a minor problem relatively.    Has blistering wounds on his shins bilaterally that pool fluid. Mr. Whittaker describes the "skin coming off when being rubbed on in the bathtub." He was given some mupirocen ointment during a hospitalization in May 2022 (5 months ago), and thought he was supposed to receive home health, "but nobody ever come."    The lightheadedness comes on when standing up. It lasts "a few minutes, then goes away." He first noticed the symptom when he had COVID in Jan 2022. He admits to some dizziness, but denies ever passing out. He takes metoprolol, sacubitril-valsartan and tamsulosin. He is on a 64 oz regime of water/day, and has a significant hx of chf and renal disease.    Received annual flu shot and covid booster, had colonoscopy, had shingles and pneumococcal vaccinations.      PMHx:  Past Medical History:   Diagnosis Date    Acquired renal cyst of left kidney     Anemia associated with chronic renal failure     CAD (coronary artery disease)     " nonobstructive [facility] 9/14    CHF (congestive heart failure)     Chronic immunosuppression with Prograf and MMF 06/18/2015    Chronic venous insufficiency of lower extremity     CKD (chronic kidney disease) stage 3, GFR 30-59 ml/min     Diabetic retinopathy     DM (diabetes mellitus), type 2 with complications 1994    Edema     End stage kidney disease     s/p transplant, doing well    Gallbladder polyp     Heart failure, diastolic, due to HTN     Hemodialysis status     off since transplant    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 RNA NEGATIVE 5/2015, 2021    History of colon polyps     HPTH (hyperparathyroidism)     Hyperlipidemia     Hypertension associated with stage 3 chronic kidney disease due to type 2 diabetes mellitus     LBBB (left bundle branch block) 12/20/2021    Morbid obesity with BMI of 45.0-49.9, adult     PCO (posterior capsular opacification), left 03/04/2019    Proteinuria     resolved s/p transplant    S/P kidney transplant     Sleep apnea     Type 2 diabetes, uncontrolled, with retinopathy     Type II diabetes mellitus with renal manifestations        PAST SURGICAL HISTORY:  Past Surgical History:   Procedure Laterality Date    CARDIAC CATHETERIZATION  2008    normal coronary    COLONOSCOPY N/A 4/5/2018    Procedure: COLONOSCOPY;  Surgeon: Chava Ronquillo MD;  Location: [facility];  Service: Endoscopy;  Laterality: N/A;    COLONOSCOPY N/A 5/2/2022    Procedure: COLONOSCOPY;  Surgeon: Alix Puente MD;  Location: [facility];  Service: Endoscopy;  Laterality: N/A;    KIDNEY TRANSPLANT      RETINAL LASER PROCEDURE         ALLERGIES AND MEDICATIONS: updated and reviewed.  Review of patient's allergies indicates:   Allergen Reactions    Lisinopril Other (See Comments)     Other reaction(s):  cough    Actos  [pioglitazone] Other (See Comments)     Other reaction(s): CHF    Metformin Other (See Comments)     Other reaction(s): renal insuff  Other reaction(s): CHF  "    Current Outpatient Medications   Medication Sig Dispense Refill    apixaban (ELIQUIS) 5 mg Tab Take 1 tablet (5 mg total) by mouth 2 (two) times daily. 60 tablet 6    aspirin (ECOTRIN) 81 MG EC tablet Take 1 tablet by mouth Daily.       BD ULTRA-FINE MINI PEN NEEDLE 31 gauge x 3/16" Ndle Use to inject insulin as needed up to 4 times daily (Patient taking differently: Use to inject insulin as needed up to 4 times daily) 100 each 3    blood sugar diagnostic Strp Use to test four times per day (Patient taking differently: Use to test four times per day) 150 strip 11    calcitRIOL (ROCALTROL) 0.25 MCG Cap Take 1 capsule (0.25 mcg total) by mouth once daily. 30 capsule 11    famotidine (PEPCID) 20 MG tablet TAKE ONE TABLET BY MOUTH EVERY EVENING 30 tablet 11    fish oil-omega-3 fatty acids 300-1,000 mg capsule Take 1 g by mouth once daily.      furosemide (LASIX) 80 MG tablet Take one-half tablet (40 mg) by mouth 2 (two) times daily. 30 tablet 11    glimepiride (AMARYL) 2 MG tablet Take 1 tablet (2 mg total) by mouth before breakfast. 90 tablet 3    insulin (LANTUS SOLOSTAR U-100 INSULIN) glargine 100 units/mL SubQ pen Inject 35 Units into the skin 2 (two) times daily. 30 mL 11    insulin aspart U-100 (NOVOLOG FLEXPEN U-100 INSULIN) 100 unit/mL (3 mL) InPn pen Inject 25 Units into the skin 3 (three) times daily with meals. (Patient taking differently: Inject 20-30 Units into the skin 2 (two) times daily with meals.) 30 mL 11    ketoconazole (NIZORAL) 200 mg Tab Take HALF A tablet (100 mg total) by mouth once daily. 15 tablet 9    lancets 33 gauge Misc 1 Stick by Misc.(Non-Drug; Combo Route) route as directed. Contour lancets. Use to check BG 2-3 times daily. 150 each 11    magnesium oxide (MAG-OX) 400 mg (241.3 mg magnesium) tablet Take 1 tablet (400 mg total) by mouth once daily. 90 tablet 1    metOLazone (ZAROXOLYN) 2.5 MG tablet Take 1 tablet by mouth on Monday and Friday as directed 30 tablet 11    metoprolol " "succinate (TOPROL-XL) 25 MG 24 hr tablet Take 1 tablet (25 mg total) by mouth once daily. 30 tablet 11    multivitamin (THERAGRAN) tablet Take 1 tablet by mouth once daily.      mupirocin (BACTROBAN) 2 % ointment Apply topically 3 (three) times daily. Use as directed on the leg wound. 22 g 0    mycophenolate (CELLCEPT) 250 mg Cap Take 3 capsules (750 mg total) by mouth 2 (two) times daily. 180 capsule 11    ondansetron (ZOFRAN) 4 MG tablet Take 1 tablet (4 mg total) by mouth every 6 (six) hours. 30 tablet 0    pen needle, diabetic (BD INSULIN PEN NEEDLE UF SHORT) 31 gauge x 5/16" Ndle USE TO INJECT INSULIN TWICE A  each 5    potassium chloride SA (K-DUR,KLOR-CON) 20 MEQ tablet Take 2 tablets (40 mEq total) by mouth once daily. 60 tablet 3    predniSONE (DELTASONE) 5 MG tablet Take 1 tablet (5 mg total) by mouth once daily. 30 tablet 11    promethazine-dextromethorphan (PROMETHAZINE-DM) 6.25-15 mg/5 mL Syrp Take by mouth.      rosuvastatin (CRESTOR) 40 MG Tab Take 1 tablet (40 mg total) by mouth once daily in the evening. 90 tablet 3    sacubitriL-valsartan (ENTRESTO)  mg per tablet Take 1 tablet by mouth 2 (two) times daily. 60 tablet 3    tacrolimus (PROGRAF) 1 MG Cap Take 4 capsules (4 mg total) by mouth every 12 (twelve) hours. 240 capsule 11    tamsulosin (FLOMAX) 0.4 mg Cap Take 1 capsule (0.4 mg total) by mouth once daily. 30 capsule 11    montelukast (SINGULAIR) 10 mg tablet Take 1 tablet (10 mg total) by mouth every evening. 30 tablet 1     Current Facility-Administered Medications   Medication Dose Route Frequency Provider Last Rate Last Admin    acetaminophen tablet 650 mg  650 mg Oral Once PRN Sal Lopez MD           SOCIAL HISTORY:  Social History     Socioeconomic History    Marital status:     Number of children: 2   Occupational History    Occupation: retired     Employer: Retired   Tobacco Use    Smoking status: Former     Types: Cigarettes     Quit date: 5/25/2013     " "Years since quittin.3    Smokeless tobacco: Former     Quit date: 2013    Tobacco comments:     used marijuana since 6311-1339, stopped after started dialysis   Substance and Sexual Activity    Alcohol use: No     Alcohol/week: 0.0 standard drinks    Drug use: No     Comment:      Sexual activity: Never   Social History Narrative    . Lives with spouse. Has 2 children. Patient retired as  for [employer]. He has been washing cars.       FAMILY HISTORY:  Family History   Problem Relation Age of Onset    Diabetes Mother     Hypertension Mother     Heart failure Mother     Kidney disease Sister         ESRD    Diabetes Sister     Diabetes Maternal Grandmother     Cancer Neg Hx          ROS  Review of Systems   Constitutional:  Positive for chills and fatigue. Negative for unexpected weight change.   HENT:  Positive for rhinorrhea, sinus pressure, sinus pain, sneezing and sore throat.    Eyes:  Positive for redness.   Respiratory:  Positive for cough and shortness of breath. Negative for chest tightness and wheezing.    Cardiovascular:  Positive for palpitations and leg swelling. Negative for chest pain.   Gastrointestinal:  Positive for diarrhea. Negative for abdominal distention, abdominal pain, constipation, nausea and vomiting.   Endocrine: Positive for polyuria.   Musculoskeletal:  Positive for arthralgias.   Skin:  Positive for rash.        Blistering rash on skins bilaterally   Neurological:  Positive for dizziness, light-headedness and headaches. Negative for seizures and syncope.       Objective     Physical Exam  Vitals:    10/03/22 0925   BP: (!) 110/56   Pulse: 105   Resp: 20   Temp: 98.1 °F (36.7 °C)   TempSrc: Oral   SpO2: 99%   Weight: 125.1 kg (275 lb 12.7 oz)   Height: 5' 7" (1.702 m)    Body mass index is 43.2 kg/m².  Weight: 125.1 kg (275 lb 12.7 oz)   Height: 5' 7" (170.2 cm)     Physical Exam  Constitutional:       Appearance: Normal appearance. He is morbidly obese. "   Cardiovascular:      Rate and Rhythm: Regular rhythm. Tachycardia present.      Pulses: Normal pulses.      Heart sounds: No murmur heard.    No friction rub.   Pulmonary:      Effort: Accessory muscle usage present.      Breath sounds: Normal breath sounds. No wheezing, rhonchi or rales.   Abdominal:      General: Abdomen is flat. Bowel sounds are normal. There is no distension.      Tenderness: There is no abdominal tenderness. There is no guarding or rebound.      Comments: Scar on right flank from kidney transpalnt   Neurological:      Mental Status: He is alert.   Psychiatric:         Mood and Affect: Mood normal.       Laboratory:  Lab Results   Component Value Date    WBC 7.07 09/19/2022    HGB 9.9 (L) 09/19/2022    HCT 34.4 (L) 09/19/2022     09/19/2022    CHOL 183 09/19/2022    TRIG 113 09/19/2022    HDL 46 09/19/2022    ALT 11 09/19/2022    AST 13 09/19/2022     09/19/2022    K 3.6 09/19/2022    CL 98 09/19/2022    CREATININE 2.9 (H) 09/19/2022    BUN 39 (H) 09/19/2022    CO2 31 (H) 09/19/2022    TSH 0.999 03/11/2022    PSA 0.33 10/18/2021    INR 1.0 06/18/2015    HGBA1C 7.1 (H) 09/19/2022       Health Maintenance         Date Due Completion Date    COVID-19 Vaccine (5 - Booster for Pfizer series) 10/12/2022 8/17/2022    Foot Exam 10/15/2022 10/15/2021 (Done)    Override on 10/15/2021: Done    Override on 9/26/2019: Done    Override on 2/16/2015: Done    Override on 12/8/2014: Done    Override on 10/26/2014: Done    Override on 9/11/2013: Done    Diabetes Urine Screening 03/11/2023 3/11/2022    Hemoglobin A1c 03/19/2023 9/19/2022    Eye Exam 04/25/2023 4/25/2022    Lipid Panel 09/19/2023 9/19/2022    High Dose Statin 10/03/2023 10/3/2022    Colorectal Cancer Screening 05/02/2027 5/2/2022    TETANUS VACCINE 03/18/2032 3/18/2022              Assessment and Plan     Problem list  Routine health screening  Cough  Hemorroid management  Wound check  Orthostatic hypotension  CHF      # Routine  health screening  - up to date on vaccinations  - up to date on cancer screening  - The 10-year ASCVD risk score (Char CLEMENTS, et al., 2019) is: 24.4%    Values used to calculate the score:      Age: 66 years      Sex: Male      Is Non- : Yes      Diabetic: Yes      Tobacco smoker: No      Systolic Blood Pressure: 110 mmHg      Is BP treated: Yes      HDL Cholesterol: 46 mg/dL      Total Cholesterol: 183 mg/dL    PLAN  - F/U with PCP as needed      # Cough likely of a multifactorial etiology (post nasal drip, allergies, gerd, chf) in the setting of chronic immunosuppression and morbid obesity  - 10 months duration  - clear and yellow sputum  - associated allergic and/or sinus symptoms  - concomitant heart disease  - hx of GERD    PLAN  - consider PPI for GERD  - consider montelukast for atopic triad  - consider switching off entresto given cough  - consider adjusting diuresis regimen      # Wound check  - began around may 2022  - large blisters that may shear under normal physical force  - currently treated with mupirocen ointment    PLAN  - consult dermatology for possible bullous pemphigoid      # Orthostatic hypotension  - comes on with positional changes, lasting only a few minutes  - assoc with pre-syncope, but no syncope  - takes metoprolol, sacubitril-valsartan and tamsulosin  - a 64 oz regime of water/day  - significant hx of chf and renal disease    PLAN  - f/u with cardiologist for medication evaluation      # CHF   - hospitalized in may 2022 for volume overload    PLAN  - continue with cardiologist  - cont home medicines      Bhupinder Pickens, MS-4  [employer]    I hereby acknowledge that I am relying upon documentation authored by a medical student working under my supervision and further I hereby attest that I have verified the student documentation or findings by personally performing the physical exam and medical decision making activities  of the Evaluation and Management service to be billed.     Alix Kowalski D.O.    See addendum below.    Mitch was seen today for annual exam and establish care.    Exam (addendum):   EXTREMITIES: Blistering lesions on BLE with minimal erythema; edema noted     Diagnoses and all orders for this visit:    Annual physical exam  -     Counseled regarding age appropriate screenings and immunizations  -     Counseled regarding lifestyle modifications    Allergic cough  -     start montelukast (SINGULAIR) 10 mg tablet; Take 1 tablet (10 mg total) by mouth every evening. Discussed medication risks and benefits.     Skin lesions  -     Ambulatory referral/consult to Dermatology; Future    Uncontrolled type 2 diabetes mellitus with hyperglycemia, with long-term current use of insulin  -     Lifestyle modifications discussed  -     continue current medication  -     recommend consistent glucose monitoring  -     Comprehensive Metabolic Panel; Standing  -     Hemoglobin A1C; Standing  -     Lipid Panel; Standing  -     Ambulatory referral/consult to Podiatry; Future    Kidney transplanted  -     f/u with nephrology    Chronic combined systolic and diastolic CHF (congestive heart failure)  -     counseled regarding fluid restriction  -     continue current medication--caution advised with position changes  -     f/u with cardiology    Immunization due  -     Influenza - Quadrivalent (Adjuvanted)    F/U in 1 month for cough.

## 2022-10-10 ENCOUNTER — OFFICE VISIT (OUTPATIENT)
Dept: DERMATOLOGY | Facility: CLINIC | Age: 67
End: 2022-10-10
Payer: COMMERCIAL

## 2022-10-10 ENCOUNTER — OFFICE VISIT (OUTPATIENT)
Dept: PODIATRY | Facility: CLINIC | Age: 67
End: 2022-10-10
Payer: COMMERCIAL

## 2022-10-10 VITALS — WEIGHT: 275.81 LBS | BODY MASS INDEX: 43.29 KG/M2 | HEIGHT: 67 IN

## 2022-10-10 DIAGNOSIS — E11.9 ENCOUNTER FOR COMPREHENSIVE DIABETIC FOOT EXAMINATION, TYPE 2 DIABETES MELLITUS: Primary | ICD-10-CM

## 2022-10-10 DIAGNOSIS — E11.65 UNCONTROLLED TYPE 2 DIABETES MELLITUS WITH HYPERGLYCEMIA, WITH LONG-TERM CURRENT USE OF INSULIN: ICD-10-CM

## 2022-10-10 DIAGNOSIS — Z79.4 UNCONTROLLED TYPE 2 DIABETES MELLITUS WITH HYPERGLYCEMIA, WITH LONG-TERM CURRENT USE OF INSULIN: ICD-10-CM

## 2022-10-10 DIAGNOSIS — I87.2 VENOUS STASIS DERMATITIS OF BOTH LOWER EXTREMITIES: ICD-10-CM

## 2022-10-10 DIAGNOSIS — T14.8XXA BLISTER DUE TO ACUTE EDEMA: ICD-10-CM

## 2022-10-10 DIAGNOSIS — R60.9 BLISTER DUE TO ACUTE EDEMA: ICD-10-CM

## 2022-10-10 DIAGNOSIS — I87.2 VENOUS STASIS DERMATITIS, UNSPECIFIED LATERALITY: Primary | ICD-10-CM

## 2022-10-10 DIAGNOSIS — B35.1 DERMATOPHYTOSIS OF NAIL: ICD-10-CM

## 2022-10-10 PROCEDURE — 4010F ACE/ARB THERAPY RXD/TAKEN: CPT | Mod: CPTII,S$GLB,, | Performed by: STUDENT IN AN ORGANIZED HEALTH CARE EDUCATION/TRAINING PROGRAM

## 2022-10-10 PROCEDURE — 1160F RVW MEDS BY RX/DR IN RCRD: CPT | Mod: CPTII,S$GLB,, | Performed by: PODIATRIST

## 2022-10-10 PROCEDURE — 1101F PR PT FALLS ASSESS DOC 0-1 FALLS W/OUT INJ PAST YR: ICD-10-PCS | Mod: CPTII,S$GLB,, | Performed by: PODIATRIST

## 2022-10-10 PROCEDURE — 3066F PR DOCUMENTATION OF TREATMENT FOR NEPHROPATHY: ICD-10-PCS | Mod: CPTII,S$GLB,, | Performed by: PODIATRIST

## 2022-10-10 PROCEDURE — 3051F HG A1C>EQUAL 7.0%<8.0%: CPT | Mod: CPTII,S$GLB,, | Performed by: STUDENT IN AN ORGANIZED HEALTH CARE EDUCATION/TRAINING PROGRAM

## 2022-10-10 PROCEDURE — 3008F BODY MASS INDEX DOCD: CPT | Mod: CPTII,S$GLB,, | Performed by: PODIATRIST

## 2022-10-10 PROCEDURE — 1160F PR REVIEW ALL MEDS BY PRESCRIBER/CLIN PHARMACIST DOCUMENTED: ICD-10-PCS | Mod: CPTII,S$GLB,, | Performed by: STUDENT IN AN ORGANIZED HEALTH CARE EDUCATION/TRAINING PROGRAM

## 2022-10-10 PROCEDURE — 3051F PR MOST RECENT HEMOGLOBIN A1C LEVEL 7.0 - < 8.0%: ICD-10-PCS | Mod: CPTII,S$GLB,, | Performed by: STUDENT IN AN ORGANIZED HEALTH CARE EDUCATION/TRAINING PROGRAM

## 2022-10-10 PROCEDURE — 3288F FALL RISK ASSESSMENT DOCD: CPT | Mod: CPTII,S$GLB,, | Performed by: PODIATRIST

## 2022-10-10 PROCEDURE — 1126F PR PAIN SEVERITY QUANTIFIED, NO PAIN PRESENT: ICD-10-PCS | Mod: CPTII,S$GLB,, | Performed by: PODIATRIST

## 2022-10-10 PROCEDURE — 1159F PR MEDICATION LIST DOCUMENTED IN MEDICAL RECORD: ICD-10-PCS | Mod: CPTII,S$GLB,, | Performed by: STUDENT IN AN ORGANIZED HEALTH CARE EDUCATION/TRAINING PROGRAM

## 2022-10-10 PROCEDURE — 4010F PR ACE/ARB THEARPY RXD/TAKEN: ICD-10-PCS | Mod: CPTII,S$GLB,, | Performed by: STUDENT IN AN ORGANIZED HEALTH CARE EDUCATION/TRAINING PROGRAM

## 2022-10-10 PROCEDURE — 1126F AMNT PAIN NOTED NONE PRSNT: CPT | Mod: CPTII,S$GLB,, | Performed by: PODIATRIST

## 2022-10-10 PROCEDURE — 3072F LOW RISK FOR RETINOPATHY: CPT | Mod: CPTII,S$GLB,, | Performed by: STUDENT IN AN ORGANIZED HEALTH CARE EDUCATION/TRAINING PROGRAM

## 2022-10-10 PROCEDURE — 1159F PR MEDICATION LIST DOCUMENTED IN MEDICAL RECORD: ICD-10-PCS | Mod: CPTII,S$GLB,, | Performed by: PODIATRIST

## 2022-10-10 PROCEDURE — 3061F PR NEG MICROALBUMINURIA RESULT DOCUMENTED/REVIEW: ICD-10-PCS | Mod: CPTII,S$GLB,, | Performed by: STUDENT IN AN ORGANIZED HEALTH CARE EDUCATION/TRAINING PROGRAM

## 2022-10-10 PROCEDURE — 99204 PR OFFICE/OUTPT VISIT, NEW, LEVL IV, 45-59 MIN: ICD-10-PCS | Mod: 25,S$GLB,, | Performed by: PODIATRIST

## 2022-10-10 PROCEDURE — 4010F PR ACE/ARB THEARPY RXD/TAKEN: ICD-10-PCS | Mod: CPTII,S$GLB,, | Performed by: PODIATRIST

## 2022-10-10 PROCEDURE — 1159F MED LIST DOCD IN RCRD: CPT | Mod: CPTII,S$GLB,, | Performed by: PODIATRIST

## 2022-10-10 PROCEDURE — 99999 PR PBB SHADOW E&M-EST. PATIENT-LVL III: ICD-10-PCS | Mod: PBBFAC,,, | Performed by: PODIATRIST

## 2022-10-10 PROCEDURE — 3051F PR MOST RECENT HEMOGLOBIN A1C LEVEL 7.0 - < 8.0%: ICD-10-PCS | Mod: CPTII,S$GLB,, | Performed by: PODIATRIST

## 2022-10-10 PROCEDURE — 3051F HG A1C>EQUAL 7.0%<8.0%: CPT | Mod: CPTII,S$GLB,, | Performed by: PODIATRIST

## 2022-10-10 PROCEDURE — 11721 PR DEBRIDEMENT OF NAILS, 6 OR MORE: ICD-10-PCS | Mod: S$GLB,,, | Performed by: PODIATRIST

## 2022-10-10 PROCEDURE — 1101F PT FALLS ASSESS-DOCD LE1/YR: CPT | Mod: CPTII,S$GLB,, | Performed by: PODIATRIST

## 2022-10-10 PROCEDURE — 99999 PR PBB SHADOW E&M-EST. PATIENT-LVL IV: CPT | Mod: PBBFAC,,, | Performed by: STUDENT IN AN ORGANIZED HEALTH CARE EDUCATION/TRAINING PROGRAM

## 2022-10-10 PROCEDURE — 99204 OFFICE O/P NEW MOD 45 MIN: CPT | Mod: 25,S$GLB,, | Performed by: PODIATRIST

## 2022-10-10 PROCEDURE — 3066F PR DOCUMENTATION OF TREATMENT FOR NEPHROPATHY: ICD-10-PCS | Mod: CPTII,S$GLB,, | Performed by: STUDENT IN AN ORGANIZED HEALTH CARE EDUCATION/TRAINING PROGRAM

## 2022-10-10 PROCEDURE — 3061F PR NEG MICROALBUMINURIA RESULT DOCUMENTED/REVIEW: ICD-10-PCS | Mod: CPTII,S$GLB,, | Performed by: PODIATRIST

## 2022-10-10 PROCEDURE — 3066F NEPHROPATHY DOC TX: CPT | Mod: CPTII,S$GLB,, | Performed by: STUDENT IN AN ORGANIZED HEALTH CARE EDUCATION/TRAINING PROGRAM

## 2022-10-10 PROCEDURE — 99999 PR PBB SHADOW E&M-EST. PATIENT-LVL IV: ICD-10-PCS | Mod: PBBFAC,,, | Performed by: STUDENT IN AN ORGANIZED HEALTH CARE EDUCATION/TRAINING PROGRAM

## 2022-10-10 PROCEDURE — 3288F PR FALLS RISK ASSESSMENT DOCUMENTED: ICD-10-PCS | Mod: CPTII,S$GLB,, | Performed by: STUDENT IN AN ORGANIZED HEALTH CARE EDUCATION/TRAINING PROGRAM

## 2022-10-10 PROCEDURE — 3072F PR LOW RISK FOR RETINOPATHY: ICD-10-PCS | Mod: CPTII,S$GLB,, | Performed by: PODIATRIST

## 2022-10-10 PROCEDURE — 3008F PR BODY MASS INDEX (BMI) DOCUMENTED: ICD-10-PCS | Mod: CPTII,S$GLB,, | Performed by: PODIATRIST

## 2022-10-10 PROCEDURE — 11721 DEBRIDE NAIL 6 OR MORE: CPT | Mod: S$GLB,,, | Performed by: PODIATRIST

## 2022-10-10 PROCEDURE — 99214 OFFICE O/P EST MOD 30 MIN: CPT | Mod: S$GLB,,, | Performed by: STUDENT IN AN ORGANIZED HEALTH CARE EDUCATION/TRAINING PROGRAM

## 2022-10-10 PROCEDURE — 3061F NEG MICROALBUMINURIA REV: CPT | Mod: CPTII,S$GLB,, | Performed by: STUDENT IN AN ORGANIZED HEALTH CARE EDUCATION/TRAINING PROGRAM

## 2022-10-10 PROCEDURE — 99999 PR PBB SHADOW E&M-EST. PATIENT-LVL III: CPT | Mod: PBBFAC,,, | Performed by: PODIATRIST

## 2022-10-10 PROCEDURE — 3072F PR LOW RISK FOR RETINOPATHY: ICD-10-PCS | Mod: CPTII,S$GLB,, | Performed by: STUDENT IN AN ORGANIZED HEALTH CARE EDUCATION/TRAINING PROGRAM

## 2022-10-10 PROCEDURE — 99214 PR OFFICE/OUTPT VISIT, EST, LEVL IV, 30-39 MIN: ICD-10-PCS | Mod: S$GLB,,, | Performed by: STUDENT IN AN ORGANIZED HEALTH CARE EDUCATION/TRAINING PROGRAM

## 2022-10-10 PROCEDURE — 1160F PR REVIEW ALL MEDS BY PRESCRIBER/CLIN PHARMACIST DOCUMENTED: ICD-10-PCS | Mod: CPTII,S$GLB,, | Performed by: PODIATRIST

## 2022-10-10 PROCEDURE — 1101F PR PT FALLS ASSESS DOC 0-1 FALLS W/OUT INJ PAST YR: ICD-10-PCS | Mod: CPTII,S$GLB,, | Performed by: STUDENT IN AN ORGANIZED HEALTH CARE EDUCATION/TRAINING PROGRAM

## 2022-10-10 PROCEDURE — 1160F RVW MEDS BY RX/DR IN RCRD: CPT | Mod: CPTII,S$GLB,, | Performed by: STUDENT IN AN ORGANIZED HEALTH CARE EDUCATION/TRAINING PROGRAM

## 2022-10-10 PROCEDURE — 3072F LOW RISK FOR RETINOPATHY: CPT | Mod: CPTII,S$GLB,, | Performed by: PODIATRIST

## 2022-10-10 PROCEDURE — 3288F FALL RISK ASSESSMENT DOCD: CPT | Mod: CPTII,S$GLB,, | Performed by: STUDENT IN AN ORGANIZED HEALTH CARE EDUCATION/TRAINING PROGRAM

## 2022-10-10 PROCEDURE — 3066F NEPHROPATHY DOC TX: CPT | Mod: CPTII,S$GLB,, | Performed by: PODIATRIST

## 2022-10-10 PROCEDURE — 4010F ACE/ARB THERAPY RXD/TAKEN: CPT | Mod: CPTII,S$GLB,, | Performed by: PODIATRIST

## 2022-10-10 PROCEDURE — 1159F MED LIST DOCD IN RCRD: CPT | Mod: CPTII,S$GLB,, | Performed by: STUDENT IN AN ORGANIZED HEALTH CARE EDUCATION/TRAINING PROGRAM

## 2022-10-10 PROCEDURE — 1101F PT FALLS ASSESS-DOCD LE1/YR: CPT | Mod: CPTII,S$GLB,, | Performed by: STUDENT IN AN ORGANIZED HEALTH CARE EDUCATION/TRAINING PROGRAM

## 2022-10-10 PROCEDURE — 3288F PR FALLS RISK ASSESSMENT DOCUMENTED: ICD-10-PCS | Mod: CPTII,S$GLB,, | Performed by: PODIATRIST

## 2022-10-10 PROCEDURE — 3061F NEG MICROALBUMINURIA REV: CPT | Mod: CPTII,S$GLB,, | Performed by: PODIATRIST

## 2022-10-10 RX ORDER — TRIAMCINOLONE ACETONIDE 1 MG/G
OINTMENT TOPICAL 2 TIMES DAILY
Qty: 454 G | Refills: 1 | Status: SHIPPED | OUTPATIENT
Start: 2022-10-10

## 2022-10-10 RX ORDER — MUPIROCIN 20 MG/G
OINTMENT TOPICAL 3 TIMES DAILY
Qty: 22 G | Refills: 1 | Status: SHIPPED | OUTPATIENT
Start: 2022-10-10 | End: 2023-05-25

## 2022-10-10 NOTE — PROGRESS NOTES
Subjective:     Patient ID: Mitch Whittaker is a 66 y.o. male.    Chief Complaint: Diabetic Foot Exam (Diabetic pt wears slippers, pt c/o 0/10 pain, PCP Dr. Kowalski last seen 10-3-22)    Mitch is a 66 y.o. male who presents to the clinic for evaluation and treatment of high risk feet. Mitch has a past medical history of Acquired renal cyst of left kidney, Anemia associated with chronic renal failure, CAD (coronary artery disease), CHF (congestive heart failure), Chronic immunosuppression with Prograf and MMF (06/18/2015), Chronic venous insufficiency of lower extremity, CKD (chronic kidney disease) stage 3, GFR 30-59 ml/min, Diabetic retinopathy, DM (diabetes mellitus), type 2 with complications (1994), Edema, End stage kidney disease, Gallbladder polyp, Heart failure, diastolic, due to HTN, Hemodialysis status, 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, History of colon polyps, HPTH (hyperparathyroidism), Hyperlipidemia, Hypertension associated with stage 3 chronic kidney disease due to type 2 diabetes mellitus, LBBB (left bundle branch block) (12/20/2021), Morbid obesity with BMI of 45.0-49.9, adult, PCO (posterior capsular opacification), left (03/04/2019), Proteinuria, S/P kidney transplant, Sleep apnea, Type 2 diabetes, uncontrolled, with retinopathy, and Type II diabetes mellitus with renal manifestations. The patient's chief complaint is long, thick toenails. This patient has documented high risk feet requiring routine maintenance secondary to diabetes mellitis and those secondary complications of diabetes, as mentioned..    PCP: Alix Kowalski DO  referring provider  Date Last Seen by PCP: 10/03/2022    Current shoe gear:  Affected Foot: Slip-on shoes     Unaffected Foot: Slip-on shoes    Hemoglobin A1C   Date Value Ref Range Status   09/19/2022 7.1 (H) 4.0 - 5.6 % Final     Comment:     ADA Screening Guidelines:  5.7-6.4%  Consistent with prediabetes  >or=6.5%  Consistent with diabetes    High levels of fetal  hemoglobin interfere with the HbA1C  assay. Heterozygous hemoglobin variants (HbS, HgC, etc)do  not significantly interfere with this assay.   However, presence of multiple variants may affect accuracy.     03/11/2022 6.8 (H) 4.0 - 5.6 % Final     Comment:     ADA Screening Guidelines:  5.7-6.4%  Consistent with prediabetes  >or=6.5%  Consistent with diabetes    High levels of fetal hemoglobin interfere with the HbA1C  assay. Heterozygous hemoglobin variants (HbS, HgC, etc)do  not significantly interfere with this assay.   However, presence of multiple variants may affect accuracy.     12/17/2021 7.5 (H) 4.0 - 5.6 % Final     Comment:     ADA Screening Guidelines:  5.7-6.4%  Consistent with prediabetes  >or=6.5%  Consistent with diabetes    High levels of fetal hemoglobin interfere with the HbA1C  assay. Heterozygous hemoglobin variants (HbS, HgC, etc)do  not significantly interfere with this assay.   However, presence of multiple variants may affect accuracy.         Patient Active Problem List   Diagnosis    Anemia associated with chronic renal failure    Obesity    Acquired renal cyst of left kidney    Nephrolithiasis    Sleep apnea    Pseudophakia    CAD (coronary artery disease)    Living-related donor kidney transplant (daughter) - 6/12/15    Diabetic peripheral neuropathy    Chronic immunosuppression with Prograf, MMF and prednisone    Secondary hyperparathyroidism of renal origin    CKD (chronic kidney disease) stage 3, GFR 30-59 ml/min    Type II diabetes mellitus with renal manifestations    Hypertension associated with stage 3 chronic kidney disease due to type 2 diabetes mellitus    DM (diabetes mellitus), type 2 with complications    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, with long-term current use of insulin    Epiretinal membrane (ERM) of both eyes    History of colon polyps    Benign prostatic hyperplasia without lower urinary tract symptoms    PCO (posterior  "capsular opacification), left    Chronic combined systolic and diastolic congestive heart failure    Deep vein thrombosis (DVT) of lower extremity    Chronic venous insufficiency of lower extremity    Morbid obesity with BMI of 40.0-44.9, adult    Infiltrative cardiomyopathy--suspected and if amyloidosis ruled out this diagnosis should be removed from his medical record.    LBBB (left bundle branch block)    Lambda light chain disease    Cellulitis of extremity       Medication List with Changes/Refills   Current Medications    APIXABAN (ELIQUIS) 5 MG TAB    Take 1 tablet (5 mg total) by mouth 2 (two) times daily.    ASPIRIN (ECOTRIN) 81 MG EC TABLET    Take 1 tablet by mouth Daily.     BD ULTRA-FINE MINI PEN NEEDLE 31 GAUGE X 3/16" NDLE    Use to inject insulin as needed up to 4 times daily    BLOOD SUGAR DIAGNOSTIC STRP    Use to test four times per day    CALCITRIOL (ROCALTROL) 0.25 MCG CAP    Take 1 capsule (0.25 mcg total) by mouth once daily.    FAMOTIDINE (PEPCID) 20 MG TABLET    TAKE ONE TABLET BY MOUTH EVERY EVENING    FISH OIL-OMEGA-3 FATTY ACIDS 300-1,000 MG CAPSULE    Take 1 g by mouth once daily.    FUROSEMIDE (LASIX) 80 MG TABLET    Take one-half tablet (40 mg) by mouth 2 (two) times daily.    GLIMEPIRIDE (AMARYL) 2 MG TABLET    Take 1 tablet (2 mg total) by mouth before breakfast.    INSULIN (LANTUS SOLOSTAR U-100 INSULIN) GLARGINE 100 UNITS/ML SUBQ PEN    Inject 35 Units into the skin 2 (two) times daily.    INSULIN ASPART U-100 (NOVOLOG FLEXPEN U-100 INSULIN) 100 UNIT/ML (3 ML) INPN PEN    Inject 25 Units into the skin 3 (three) times daily with meals.    KETOCONAZOLE (NIZORAL) 200 MG TAB    Take HALF A tablet (100 mg total) by mouth once daily.    LANCETS 33 GAUGE MISC    1 Stick by Misc.(Non-Drug; Combo Route) route as directed. Contour lancets. Use to check BG 2-3 times daily.    MAGNESIUM OXIDE (MAG-OX) 400 MG (241.3 MG MAGNESIUM) TABLET    Take 1 tablet (400 mg total) by mouth once daily.    " "METOLAZONE (ZAROXOLYN) 2.5 MG TABLET    Take 1 tablet by mouth on Monday and Friday as directed    METOPROLOL SUCCINATE (TOPROL-XL) 25 MG 24 HR TABLET    Take 1 tablet (25 mg total) by mouth once daily.    MONTELUKAST (SINGULAIR) 10 MG TABLET    Take 1 tablet (10 mg total) by mouth every evening.    MULTIVITAMIN (THERAGRAN) TABLET    Take 1 tablet by mouth once daily.    MYCOPHENOLATE (CELLCEPT) 250 MG CAP    Take 3 capsules (750 mg total) by mouth 2 (two) times daily.    ONDANSETRON (ZOFRAN) 4 MG TABLET    Take 1 tablet (4 mg total) by mouth every 6 (six) hours.    PEN NEEDLE, DIABETIC (BD INSULIN PEN NEEDLE UF SHORT) 31 GAUGE X 5/16" NDLE    USE TO INJECT INSULIN TWICE A DAY    POTASSIUM CHLORIDE SA (K-DUR,KLOR-CON) 20 MEQ TABLET    Take 2 tablets (40 mEq total) by mouth once daily.    PREDNISONE (DELTASONE) 5 MG TABLET    Take 1 tablet (5 mg total) by mouth once daily.    PROMETHAZINE-DEXTROMETHORPHAN (PROMETHAZINE-DM) 6.25-15 MG/5 ML SYRP    Take by mouth.    ROSUVASTATIN (CRESTOR) 40 MG TAB    Take 1 tablet (40 mg total) by mouth once daily in the evening.    SACUBITRIL-VALSARTAN (ENTRESTO)  MG PER TABLET    Take 1 tablet by mouth 2 (two) times daily.    TACROLIMUS (PROGRAF) 1 MG CAP    Take 4 capsules (4 mg total) by mouth every 12 (twelve) hours.    TAMSULOSIN (FLOMAX) 0.4 MG CAP    Take 1 capsule (0.4 mg total) by mouth once daily.   Changed and/or Refilled Medications    Modified Medication Previous Medication    MUPIROCIN (BACTROBAN) 2 % OINTMENT mupirocin (BACTROBAN) 2 % ointment       Apply topically 3 (three) times daily. Use as directed on the leg wound.    Apply topically 3 (three) times daily. Use as directed on the leg wound.       Review of patient's allergies indicates:   Allergen Reactions    Lisinopril Other (See Comments)     Other reaction(s):  cough    Actos  [pioglitazone] Other (See Comments)     Other reaction(s): CHF    Metformin Other (See Comments)     Other reaction(s): renal " "insuff  Other reaction(s): CHF       Past Surgical History:   Procedure Laterality Date    CARDIAC CATHETERIZATION  2008    normal coronary    COLONOSCOPY N/A 2018    Procedure: COLONOSCOPY;  Surgeon: Chava Ronquillo MD;  Location: [facility] ENDO;  Service: Endoscopy;  Laterality: N/A;    COLONOSCOPY N/A 2022    Procedure: COLONOSCOPY;  Surgeon: Alix Puente MD;  Location: [facility] ENDO;  Service: Endoscopy;  Laterality: N/A;    KIDNEY TRANSPLANT      RETINAL LASER PROCEDURE         Family History   Problem Relation Age of Onset    Diabetes Mother     Hypertension Mother     Heart failure Mother     Kidney disease Sister         ESRD    Diabetes Sister     Diabetes Maternal Grandmother     Cancer Neg Hx        Social History     Socioeconomic History    Marital status:     Number of children: 2   Occupational History    Occupation: retired     Employer: Retired   Tobacco Use    Smoking status: Former     Types: Cigarettes     Quit date: 2013     Years since quittin.3    Smokeless tobacco: Former     Quit date: 2013    Tobacco comments:     used marijuana since [id number], stopped after started dialysis   Substance and Sexual Activity    Alcohol use: No     Alcohol/week: 0.0 standard drinks    Drug use: No     Comment:      Sexual activity: Never   Social History Narrative    . Lives with spouse. Has 2 children. Patient retired as  for [employer]. He has been washing cars.       Vitals:    10/10/22 0724   Weight: 125.1 kg (275 lb 12.7 oz)   Height: 5' 7" (1.702 m)   PainSc: 0-No pain       Hemoglobin A1C   Date Value Ref Range Status   2022 7.1 (H) 4.0 - 5.6 % Final     Comment:     ADA Screening Guidelines:  5.7-6.4%  Consistent with prediabetes  >or=6.5%  Consistent with diabetes    High levels of fetal hemoglobin interfere with the HbA1C  assay. Heterozygous hemoglobin variants (HbS, HgC, etc)do  not significantly interfere with this assay.   However, " presence of multiple variants may affect accuracy.     03/11/2022 6.8 (H) 4.0 - 5.6 % Final     Comment:     ADA Screening Guidelines:  5.7-6.4%  Consistent with prediabetes  >or=6.5%  Consistent with diabetes    High levels of fetal hemoglobin interfere with the HbA1C  assay. Heterozygous hemoglobin variants (HbS, HgC, etc)do  not significantly interfere with this assay.   However, presence of multiple variants may affect accuracy.     12/17/2021 7.5 (H) 4.0 - 5.6 % Final     Comment:     ADA Screening Guidelines:  5.7-6.4%  Consistent with prediabetes  >or=6.5%  Consistent with diabetes    High levels of fetal hemoglobin interfere with the HbA1C  assay. Heterozygous hemoglobin variants (HbS, HgC, etc)do  not significantly interfere with this assay.   However, presence of multiple variants may affect accuracy.         Review of Systems   Constitutional:  Negative for chills and fever.   Respiratory:  Negative for shortness of breath.    Cardiovascular:  Negative for chest pain, palpitations, orthopnea, claudication and leg swelling.   Gastrointestinal:  Negative for diarrhea, nausea and vomiting.   Musculoskeletal:  Negative for joint pain.   Skin:  Negative for rash.   Neurological:  Positive for tingling and sensory change.   Psychiatric/Behavioral: Negative.             Objective:      PHYSICAL EXAM: Apperance: Alert and orient in no distress,well developed, and with good attention to grooming and body habits  Patient presents ambulating in slippers.  LOWER EXTREMITY EXAM:  VASCULAR: Dorsalis pedis pulses 1/4 bilateral and Posterior Tibial pulses 1/4 bilateral. Capillary fill time <blanched bilateral. Mild edema observed bilateral. Varicosities absent bilateral. Skin temperature of the lower extremities is warm to warm, proximal to distal. Hair growth dim bilateral.  DERMATOLOGICAL: No skin rashes, subcutaneous nodules, lesions, or ulcers observed bilateral. Nails 1,2,3,4,5 bilateral elongated, thickened, and  discolored with subungual debris. Webspaces 1,2,3,4 bilateral clean, dry and without evidence of break in skin integrity.   NEUROLOGICAL: Light touch, sharp-dull, proprioception all present and equal bilaterally.  Vibratory sensation absent at bilateral hallux. Protective sensation absent at 4/10 sites as tested with a [place]-Umu 5.07 monofilament.   MUSCULOSKELETAL: Muscle strength is 5/5 for foot inverters, everters, plantarflexors, and dorsiflexors. Muscle tone is normal.       Assessment:       ICD-10-CM ICD-9-CM   1. Encounter for comprehensive diabetic foot examination, type 2 diabetes mellitus  E11.9 250.00   2. Uncontrolled type 2 diabetes mellitus with hyperglycemia, with long-term current use of insulin  E11.65 250.02    Z79.4 V58.67   3. Dermatophytosis of nail  B35.1 110.1   4. Venous stasis dermatitis of both lower extremities  I87.2 454.1       Plan:   Encounter for comprehensive diabetic foot examination, type 2 diabetes mellitus    Uncontrolled type 2 diabetes mellitus with hyperglycemia, with long-term current use of insulin  -     Ambulatory referral/consult to Podiatry    Dermatophytosis of nail    Venous stasis dermatitis of both lower extremities  -     mupirocin (BACTROBAN) 2 % ointment; Apply topically 3 (three) times daily. Use as directed on the leg wound.  Dispense: 22 g; Refill: 1    I counseled the patient on his conditions, regarding findings of my examination, my impressions, and usual treatment plan.   Greater than 50% of this visit spent on counseling and coordination of care.  Greater than 12 minutes of a 15 minute appointment spent on education about the diabetic foot, neuropathy, and prevention of limb loss.  Shoe inspection. Diabetic Foot Education. Patient reminded of the importance of good nutrition and blood sugar control to help prevent podiatric complications of diabetes. Patient instructed on proper foot hygeine. We discussed wearing proper shoe gear, daily foot  inspections, never walking without protective shoe gear, never putting sharp instruments to feet.    With patient's permission, nails 1-5 bilateral were debrided/trimmed in length and thickness aggressively to their soft tissue attachment mechanically and with electric , removing all offending nail and debris. Patient relates relief following the procedure.  Prescription refilled for topical Bactroban.  Patient counseled on ways to improve swelling in lower extremities including decreasing/eliminating salt intake, elevating lower extremities, compression stocking, or oral medications. Patient states she understands.  Patient  will continue to monitor the areas daily, inspect feet, wear protective shoe gear when ambulatory, moisturizer to maintain skin integrity. Patient reminded of the importance of good nutrition and blood sugar control to help prevent podiatric complications of diabetes.  Patient to return 3 months or sooner if needed.          Chris Cabral DPM  [facility]

## 2022-10-10 NOTE — PROGRESS NOTES
Patient Information  Name: Mitch Whittaker  : 1955  MRN: [id number]     Referring Physician:  Dr. Kowalski   Primary Care Physician:  Dr. Alix Kowalski,    Date of Visit: 10/10/2022      Subjective:       Mitch Whittaker is a 66 y.o. male who presents for   Chief Complaint   Patient presents with    Lesion     Lower legs, sx fluid pockets, x year tx topical cream      HPI  Patient with hx of lower leg edema and bullae, here for follow up. Last seen Dr. Sweeney. Was recommended to follow up with cardiologist and PCP and given topical mupirocin. Patient states condition has worsened.    Patient was last seen:Visit date not found     Prior notes by myself reviewed.   Clinical documentation obtained by nursing staff reviewed.    Review of Systems   Skin:  Positive for rash. Negative for itching.      Objective:    Physical Exam   Constitutional: He appears well-developed and well-nourished. No distress.   Neurological: He is alert and oriented to person, place, and time. He is not disoriented.   Psychiatric: He has a normal mood and affect.   Skin:   Areas Examined (abnormalities noted in diagram):   RLE Inspected  LLE Inspection Performed            Diagram Legend     Erythematous scaling macule/papule c/w actinic keratosis       Vascular papule c/w angioma      Pigmented verrucoid papule/plaque c/w seborrheic keratosis      Yellow umbilicated papule c/w sebaceous hyperplasia      Irregularly shaped tan macule c/w lentigo     1-2 mm smooth white papules consistent with Milia      Movable subcutaneous cyst with punctum c/w epidermal inclusion cyst      Subcutaneous movable cyst c/w pilar cyst      Firm pink to brown papule c/w dermatofibroma      Pedunculated fleshy papule(s) c/w skin tag(s)      Evenly pigmented macule c/w junctional nevus     Mildly variegated pigmented, slightly irregular-bordered macule c/w mildly atypical nevus      Flesh colored to evenly pigmented papule c/w intradermal nevus       Pink  pearly papule/plaque c/w basal cell carcinoma      Erythematous hyperkeratotic cursted plaque c/w SCC      Surgical scar with no sign of skin cancer recurrence      Open and closed comedones      Inflammatory papules and pustules      Verrucoid papule consistent consistent with wart     Erythematous eczematous patches and plaques     Dystrophic onycholytic nail with subungual debris c/w onychomycosis     Umbilicated papule    Erythematous-base heme-crusted tan verrucoid plaque consistent with inflamed seborrheic keratosis     Erythematous Silvery Scaling Plaque c/w Psoriasis     See annotation      No images are attached to the encounter or orders placed in the encounter.    [] Data reviewed  [] Independent review of test  [] Management discussed with another provider    Assessment / Plan:        Venous stasis dermatitis, unspecified laterality/Blister due to acute edema  -     triamcinolone acetonide 0.1% (KENALOG) 0.1 % ointment; Apply topically 2 (two) times daily. Use on bilateral lower legs.  Dispense: 454 g; Refill: 1  - Continue topical mupirocin  - Discussed difficult nature of treatment given swelling. Would recommend optimizing options to improve LE edema           LOS NUMBER AND COMPLEXITY OF PROBLEMS    COMPLEXITY OF DATA RISK TOTAL TIME (m)   61430  69097 [] 1 self-limited or minor problem [x] Minimal to none [] No treatment recommended or patient to monitor 15-29  10-19   73211  26364 Low  [] 2 or > self limited or minor problems  [] 1 stable chronic illness  [] 1 acute, uncomplicated illness or injury Limited (2)  [] Prior external notes from each unique source  [] Review result of each unique test  [] Order each unique test []  Low  OTC medications, minor skin biopsy 30-44 20-29   86050  34810 Moderate  [x]  1 or > chronic illness with progression, exacerbation or SE of treatment  []  2 or more stable chronic illnesses  []  1 acute illness with systemic symptoms  []  1 acute complicated injury  []   1 undiagnosed new problem with uncertain prognosis Moderate (1/3 below)  []  3 or more data items        *Now includes assessment requiring independent historian  []  Independent interpretation of a test  []  Discuss management/test with another provider Moderate  [x]  Prescription drug mgmt  []  Minor surgery with risk discussed  []  Mgmt limited by social determinates 45-59  30-39   63790  93250 High  []  1 or more chronic illness with severe exacerbation, progression or SE of treatment  []  1 acute or chronic illness/injury that poses a threat to life or bodily function Extensive (2/3 below)  []  3 or more data items        *Now includes assessment requiring independent historian.  []  Independent interpretation of a test  []  Discuss management/test with another provider High  []  Major surgery with risk discussed  []  Drug therapy requiring intensive monitoring for toxicity  []  Hospitalization  []  Decision for DNR 60-74  40-54      No follow-ups on file.    Michelle Zarate MD, FAAD  [facility]

## 2022-10-20 RX ORDER — GLIMEPIRIDE 2 MG/1
2 TABLET ORAL
Qty: 90 TABLET | Refills: 3 | Status: SHIPPED | OUTPATIENT
Start: 2022-10-20 | End: 2023-11-28 | Stop reason: SDUPTHER

## 2022-10-20 NOTE — TELEPHONE ENCOUNTER
Refill Routing Note   Medication(s) are not appropriate for processing by [facility] for the following reason(s):      - Required laboratory values are abnormal    [facility] action(s):  Defer          Medication reconciliation completed: No     Appointments  past 12m or future 3m with PCP    Date Provider   Last Visit   3/18/2022 Alix Kowalski DO   Next Visit   Visit date not found Alix Kowalski DO   ED visits in past 90 days: 0        Note composed:4:28 PM 10/20/2022

## 2022-10-20 NOTE — TELEPHONE ENCOUNTER
No new care gaps identified.  [facility] Embedded Care Gaps. Reference number: [id number]. 10/20/2022   5:09:34 AM TERAT

## 2022-10-28 DIAGNOSIS — I50.42 CHRONIC COMBINED SYSTOLIC AND DIASTOLIC CONGESTIVE HEART FAILURE: ICD-10-CM

## 2022-10-28 RX ORDER — PEN NEEDLE, DIABETIC 31 GX5/16"
NEEDLE, DISPOSABLE MISCELLANEOUS
Qty: 100 EACH | Refills: 3 | Status: CANCELLED | OUTPATIENT
Start: 2022-10-28

## 2022-10-28 NOTE — TELEPHONE ENCOUNTER
No new care gaps identified.  [facility] Embedded Care Gaps. Reference number: [id number]. 10/28/2022   5:54:53 PM CDT

## 2022-10-29 NOTE — TELEPHONE ENCOUNTER
Refill Routing Note   Medication(s) are not appropriate for processing by [facility] for the following reason(s):      - Medication not previously prescribed by PCP    [facility] action(s):  Defer          Medication reconciliation completed: No     Appointments  past 12m or future 3m with PCP    Date Provider   Last Visit   10/3/2022 Alix Kowalski, DO   Next Visit   11/14/2022 Alix Kowalski, DO   ED visits in past 90 days: 0        Note composed:5:45 PM 10/29/2022

## 2022-10-31 RX ORDER — POTASSIUM CHLORIDE 20 MEQ/1
40 TABLET, EXTENDED RELEASE ORAL DAILY
Qty: 60 TABLET | Refills: 3 | Status: SHIPPED | OUTPATIENT
Start: 2022-10-31 | End: 2023-02-15 | Stop reason: SDUPTHER

## 2022-10-31 RX ORDER — PEN NEEDLE, DIABETIC 31 GX5/16"
NEEDLE, DISPOSABLE MISCELLANEOUS
Qty: 100 EACH | Refills: 3 | Status: SHIPPED | OUTPATIENT
Start: 2022-10-31 | End: 2023-11-07 | Stop reason: SDUPTHER

## 2022-10-31 NOTE — TELEPHONE ENCOUNTER
No new care gaps identified.  [facility] Embedded Care Gaps. Reference number: [id number]. 10/31/2022   9:41:10 AM CDT

## 2022-11-14 ENCOUNTER — LAB VISIT (OUTPATIENT)
Dept: LAB | Facility: HOSPITAL | Age: 67
End: 2022-11-14
Attending: INTERNAL MEDICINE
Payer: MEDICARE

## 2022-11-14 ENCOUNTER — OFFICE VISIT (OUTPATIENT)
Dept: INTERNAL MEDICINE | Facility: CLINIC | Age: 67
End: 2022-11-14
Payer: COMMERCIAL

## 2022-11-14 VITALS
WEIGHT: 277.56 LBS | DIASTOLIC BLOOD PRESSURE: 60 MMHG | TEMPERATURE: 98 F | BODY MASS INDEX: 43.56 KG/M2 | RESPIRATION RATE: 18 BRPM | HEIGHT: 67 IN | OXYGEN SATURATION: 99 % | HEART RATE: 114 BPM | SYSTOLIC BLOOD PRESSURE: 100 MMHG

## 2022-11-14 DIAGNOSIS — N18.4 STAGE 4 CHRONIC KIDNEY DISEASE: ICD-10-CM

## 2022-11-14 DIAGNOSIS — I50.42 CHRONIC COMBINED SYSTOLIC AND DIASTOLIC CONGESTIVE HEART FAILURE: ICD-10-CM

## 2022-11-14 DIAGNOSIS — Z94.0 KIDNEY TRANSPLANTED: ICD-10-CM

## 2022-11-14 DIAGNOSIS — R05.3 CHRONIC COUGH: Primary | ICD-10-CM

## 2022-11-14 DIAGNOSIS — R42 ORTHOSTATIC DIZZINESS: ICD-10-CM

## 2022-11-14 DIAGNOSIS — I10 HYPERTENSION GOAL BP (BLOOD PRESSURE) < 130/80: ICD-10-CM

## 2022-11-14 LAB
ALBUMIN SERPL BCP-MCNC: 3.2 G/DL (ref 3.5–5.2)
ANION GAP SERPL CALC-SCNC: 12 MMOL/L (ref 8–16)
BASOPHILS # BLD AUTO: 0.01 K/UL (ref 0–0.2)
BASOPHILS NFR BLD: 0.2 % (ref 0–1.9)
BUN SERPL-MCNC: 45 MG/DL (ref 8–23)
CALCIUM SERPL-MCNC: 9.2 MG/DL (ref 8.7–10.5)
CHLORIDE SERPL-SCNC: 102 MMOL/L (ref 95–110)
CO2 SERPL-SCNC: 30 MMOL/L (ref 23–29)
CREAT SERPL-MCNC: 2.7 MG/DL (ref 0.5–1.4)
DIFFERENTIAL METHOD: ABNORMAL
EOSINOPHIL # BLD AUTO: 0 K/UL (ref 0–0.5)
EOSINOPHIL NFR BLD: 0.5 % (ref 0–8)
ERYTHROCYTE [DISTWIDTH] IN BLOOD BY AUTOMATED COUNT: 13.7 % (ref 11.5–14.5)
EST. GFR  (NO RACE VARIABLE): 24.9 ML/MIN/1.73 M^2
GLUCOSE SERPL-MCNC: 195 MG/DL (ref 70–110)
HCT VFR BLD AUTO: 32 % (ref 40–54)
HGB BLD-MCNC: 8.9 G/DL (ref 14–18)
IMM GRANULOCYTES # BLD AUTO: 0.08 K/UL (ref 0–0.04)
IMM GRANULOCYTES NFR BLD AUTO: 1.8 % (ref 0–0.5)
LYMPHOCYTES # BLD AUTO: 0.7 K/UL (ref 1–4.8)
LYMPHOCYTES NFR BLD: 15.3 % (ref 18–48)
MCH RBC QN AUTO: 25.7 PG (ref 27–31)
MCHC RBC AUTO-ENTMCNC: 27.8 G/DL (ref 32–36)
MCV RBC AUTO: 93 FL (ref 82–98)
MONOCYTES # BLD AUTO: 0.5 K/UL (ref 0.3–1)
MONOCYTES NFR BLD: 11.5 % (ref 4–15)
NEUTROPHILS # BLD AUTO: 3.1 K/UL (ref 1.8–7.7)
NEUTROPHILS NFR BLD: 70.7 % (ref 38–73)
NRBC BLD-RTO: 0 /100 WBC
PHOSPHATE SERPL-MCNC: 3.2 MG/DL (ref 2.7–4.5)
PLATELET # BLD AUTO: 192 K/UL (ref 150–450)
PMV BLD AUTO: 11.2 FL (ref 9.2–12.9)
POTASSIUM SERPL-SCNC: 4 MMOL/L (ref 3.5–5.1)
PTH-INTACT SERPL-MCNC: 326.6 PG/ML (ref 9–77)
RBC # BLD AUTO: 3.46 M/UL (ref 4.6–6.2)
SODIUM SERPL-SCNC: 144 MMOL/L (ref 136–145)
WBC # BLD AUTO: 4.44 K/UL (ref 3.9–12.7)

## 2022-11-14 PROCEDURE — 3008F BODY MASS INDEX DOCD: CPT | Mod: CPTII,S$GLB,, | Performed by: INTERNAL MEDICINE

## 2022-11-14 PROCEDURE — 3078F DIAST BP <80 MM HG: CPT | Mod: CPTII,S$GLB,, | Performed by: INTERNAL MEDICINE

## 2022-11-14 PROCEDURE — 3288F FALL RISK ASSESSMENT DOCD: CPT | Mod: CPTII,S$GLB,, | Performed by: INTERNAL MEDICINE

## 2022-11-14 PROCEDURE — 36415 COLL VENOUS BLD VENIPUNCTURE: CPT | Performed by: INTERNAL MEDICINE

## 2022-11-14 PROCEDURE — 3051F PR MOST RECENT HEMOGLOBIN A1C LEVEL 7.0 - < 8.0%: ICD-10-PCS | Mod: CPTII,S$GLB,, | Performed by: INTERNAL MEDICINE

## 2022-11-14 PROCEDURE — 3008F PR BODY MASS INDEX (BMI) DOCUMENTED: ICD-10-PCS | Mod: CPTII,S$GLB,, | Performed by: INTERNAL MEDICINE

## 2022-11-14 PROCEDURE — 3078F PR MOST RECENT DIASTOLIC BLOOD PRESSURE < 80 MM HG: ICD-10-PCS | Mod: CPTII,S$GLB,, | Performed by: INTERNAL MEDICINE

## 2022-11-14 PROCEDURE — 1101F PT FALLS ASSESS-DOCD LE1/YR: CPT | Mod: CPTII,S$GLB,, | Performed by: INTERNAL MEDICINE

## 2022-11-14 PROCEDURE — 3074F SYST BP LT 130 MM HG: CPT | Mod: CPTII,S$GLB,, | Performed by: INTERNAL MEDICINE

## 2022-11-14 PROCEDURE — 3061F NEG MICROALBUMINURIA REV: CPT | Mod: CPTII,S$GLB,, | Performed by: INTERNAL MEDICINE

## 2022-11-14 PROCEDURE — 3074F PR MOST RECENT SYSTOLIC BLOOD PRESSURE < 130 MM HG: ICD-10-PCS | Mod: CPTII,S$GLB,, | Performed by: INTERNAL MEDICINE

## 2022-11-14 PROCEDURE — 99214 OFFICE O/P EST MOD 30 MIN: CPT | Mod: S$GLB,,, | Performed by: INTERNAL MEDICINE

## 2022-11-14 PROCEDURE — 4010F ACE/ARB THERAPY RXD/TAKEN: CPT | Mod: CPTII,S$GLB,, | Performed by: INTERNAL MEDICINE

## 2022-11-14 PROCEDURE — 1160F PR REVIEW ALL MEDS BY PRESCRIBER/CLIN PHARMACIST DOCUMENTED: ICD-10-PCS | Mod: CPTII,S$GLB,, | Performed by: INTERNAL MEDICINE

## 2022-11-14 PROCEDURE — 99999 PR PBB SHADOW E&M-EST. PATIENT-LVL V: CPT | Mod: PBBFAC,,, | Performed by: INTERNAL MEDICINE

## 2022-11-14 PROCEDURE — 1101F PR PT FALLS ASSESS DOC 0-1 FALLS W/OUT INJ PAST YR: ICD-10-PCS | Mod: CPTII,S$GLB,, | Performed by: INTERNAL MEDICINE

## 2022-11-14 PROCEDURE — 1159F PR MEDICATION LIST DOCUMENTED IN MEDICAL RECORD: ICD-10-PCS | Mod: CPTII,S$GLB,, | Performed by: INTERNAL MEDICINE

## 2022-11-14 PROCEDURE — 3072F LOW RISK FOR RETINOPATHY: CPT | Mod: CPTII,S$GLB,, | Performed by: INTERNAL MEDICINE

## 2022-11-14 PROCEDURE — 3051F HG A1C>EQUAL 7.0%<8.0%: CPT | Mod: CPTII,S$GLB,, | Performed by: INTERNAL MEDICINE

## 2022-11-14 PROCEDURE — 85025 COMPLETE CBC W/AUTO DIFF WBC: CPT | Performed by: INTERNAL MEDICINE

## 2022-11-14 PROCEDURE — 1160F RVW MEDS BY RX/DR IN RCRD: CPT | Mod: CPTII,S$GLB,, | Performed by: INTERNAL MEDICINE

## 2022-11-14 PROCEDURE — 3072F PR LOW RISK FOR RETINOPATHY: ICD-10-PCS | Mod: CPTII,S$GLB,, | Performed by: INTERNAL MEDICINE

## 2022-11-14 PROCEDURE — 3061F PR NEG MICROALBUMINURIA RESULT DOCUMENTED/REVIEW: ICD-10-PCS | Mod: CPTII,S$GLB,, | Performed by: INTERNAL MEDICINE

## 2022-11-14 PROCEDURE — 4010F PR ACE/ARB THEARPY RXD/TAKEN: ICD-10-PCS | Mod: CPTII,S$GLB,, | Performed by: INTERNAL MEDICINE

## 2022-11-14 PROCEDURE — 83970 ASSAY OF PARATHORMONE: CPT | Performed by: INTERNAL MEDICINE

## 2022-11-14 PROCEDURE — 80069 RENAL FUNCTION PANEL: CPT | Performed by: INTERNAL MEDICINE

## 2022-11-14 PROCEDURE — 3066F PR DOCUMENTATION OF TREATMENT FOR NEPHROPATHY: ICD-10-PCS | Mod: CPTII,S$GLB,, | Performed by: INTERNAL MEDICINE

## 2022-11-14 PROCEDURE — 99999 PR PBB SHADOW E&M-EST. PATIENT-LVL V: ICD-10-PCS | Mod: PBBFAC,,, | Performed by: INTERNAL MEDICINE

## 2022-11-14 PROCEDURE — 3288F PR FALLS RISK ASSESSMENT DOCUMENTED: ICD-10-PCS | Mod: CPTII,S$GLB,, | Performed by: INTERNAL MEDICINE

## 2022-11-14 PROCEDURE — 80197 ASSAY OF TACROLIMUS: CPT | Performed by: INTERNAL MEDICINE

## 2022-11-14 PROCEDURE — 1126F PR PAIN SEVERITY QUANTIFIED, NO PAIN PRESENT: ICD-10-PCS | Mod: CPTII,S$GLB,, | Performed by: INTERNAL MEDICINE

## 2022-11-14 PROCEDURE — 99214 PR OFFICE/OUTPT VISIT, EST, LEVL IV, 30-39 MIN: ICD-10-PCS | Mod: S$GLB,,, | Performed by: INTERNAL MEDICINE

## 2022-11-14 PROCEDURE — 1159F MED LIST DOCD IN RCRD: CPT | Mod: CPTII,S$GLB,, | Performed by: INTERNAL MEDICINE

## 2022-11-14 PROCEDURE — 1126F AMNT PAIN NOTED NONE PRSNT: CPT | Mod: CPTII,S$GLB,, | Performed by: INTERNAL MEDICINE

## 2022-11-14 PROCEDURE — 3066F NEPHROPATHY DOC TX: CPT | Mod: CPTII,S$GLB,, | Performed by: INTERNAL MEDICINE

## 2022-11-14 NOTE — PROGRESS NOTES
"Mitch Whittaker  66 y.o. Black or  male    HPI: Presents to the clinic to follow up on cough. He was started on montelukast at his last visit but does not feel it has helped.    He also continues to have dizzy spells. He is on medication for HTN, CHF and CKD. He is on a fluid restriction. His symptoms primarily occur when going from sitting to standing.     Past Medical History:   Diagnosis Date    Acquired renal cyst of left kidney     Anemia associated with chronic renal failure     CAD (coronary artery disease)     nonobstructive c 9/14    CHF (congestive heart failure)     Chronic immunosuppression with Prograf and MMF 06/18/2015    Chronic venous insufficiency of lower extremity     CKD (chronic kidney disease) stage 3, GFR 30-59 ml/min     Diabetic retinopathy     DM (diabetes mellitus), type 2 with complications 1994    Edema     End stage kidney disease     s/p transplant, doing well    Gallbladder polyp     Heart failure, diastolic, due to HTN     Hemodialysis status     off since transplant    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 RNA NEGATIVE 5/2015, 2021    History of colon polyps     HPTH (hyperparathyroidism)     Hyperlipidemia     Hypertension associated with stage 3 chronic kidney disease due to type 2 diabetes mellitus     LBBB (left bundle branch block) 12/20/2021    Morbid obesity with BMI of 45.0-49.9, adult     PCO (posterior capsular opacification), left 03/04/2019    Proteinuria     resolved s/p transplant    S/P kidney transplant     Sleep apnea     Type 2 diabetes, uncontrolled, with retinopathy     Type II diabetes mellitus with renal manifestations          Current Outpatient Medications:     apixaban (ELIQUIS) 5 mg Tab, Take 1 tablet (5 mg total) by mouth 2 (two) times daily., Disp: 60 tablet, Rfl: 6    aspirin (ECOTRIN) 81 MG EC tablet, Take 1 tablet by mouth Daily. , Disp: , Rfl:     BD ULTRA-FINE MINI PEN NEEDLE 31 gauge x 3/16" Ndle, " Use to inject insulin as needed up to 4 times daily, Disp: 100 each, Rfl: 3    blood sugar diagnostic Strp, Use to test four times per day (Patient taking differently: Use to test four times per day), Disp: 150 strip, Rfl: 11    calcitRIOL (ROCALTROL) 0.25 MCG Cap, Take 1 capsule (0.25 mcg total) by mouth once daily., Disp: 30 capsule, Rfl: 11    famotidine (PEPCID) 20 MG tablet, TAKE ONE TABLET BY MOUTH EVERY EVENING, Disp: 30 tablet, Rfl: 11    fish oil-omega-3 fatty acids 300-1,000 mg capsule, Take 1 g by mouth once daily., Disp: , Rfl:     furosemide (LASIX) 80 MG tablet, Take one-half tablet (40 mg) by mouth 2 (two) times daily., Disp: 30 tablet, Rfl: 11    glimepiride (AMARYL) 2 MG tablet, Take 1 tablet (2 mg total) by mouth before breakfast., Disp: 90 tablet, Rfl: 3    insulin (LANTUS SOLOSTAR U-100 INSULIN) glargine 100 units/mL SubQ pen, Inject 35 Units into the skin 2 (two) times daily., Disp: 30 mL, Rfl: 11    insulin aspart U-100 (NOVOLOG FLEXPEN U-100 INSULIN) 100 unit/mL (3 mL) InPn pen, Inject 25 Units into the skin 3 (three) times daily with meals. (Patient taking differently: Inject 20-30 Units into the skin 2 (two) times daily with meals.), Disp: 30 mL, Rfl: 11    ketoconazole (NIZORAL) 200 mg Tab, Take HALF A tablet (100 mg total) by mouth once daily., Disp: 15 tablet, Rfl: 9    lancets 33 gauge Misc, 1 Stick by Misc.(Non-Drug; Combo Route) route as directed. Contour lancets. Use to check BG 2-3 times daily., Disp: 150 each, Rfl: 11    magnesium oxide (MAG-OX) 400 mg (241.3 mg magnesium) tablet, Take 1 tablet (400 mg total) by mouth once daily., Disp: 90 tablet, Rfl: 1    metOLazone (ZAROXOLYN) 2.5 MG tablet, Take 1 tablet by mouth on Monday and Friday as directed, Disp: 30 tablet, Rfl: 11    metoprolol succinate (TOPROL-XL) 25 MG 24 hr tablet, Take 1 tablet (25 mg total) by mouth once daily., Disp: 30 tablet, Rfl: 11    montelukast (SINGULAIR) 10 mg tablet, Take 1 tablet (10 mg total) by mouth  "every evening., Disp: 30 tablet, Rfl: 1    multivitamin (THERAGRAN) tablet, Take 1 tablet by mouth once daily., Disp: , Rfl:     mupirocin (BACTROBAN) 2 % ointment, Apply topically 3 (three) times daily. Use as directed on the leg wound., Disp: 22 g, Rfl: 1    mycophenolate (CELLCEPT) 250 mg Cap, Take 3 capsules (750 mg total) by mouth 2 (two) times daily., Disp: 180 capsule, Rfl: 11    ondansetron (ZOFRAN) 4 MG tablet, Take 1 tablet (4 mg total) by mouth every 6 (six) hours., Disp: 30 tablet, Rfl: 0    pen needle, diabetic (BD INSULIN PEN NEEDLE UF SHORT) 31 gauge x 5/16" Ndle, USE TO INJECT INSULIN TWICE A DAY, Disp: 200 each, Rfl: 5    potassium chloride SA (K-DUR,KLOR-CON) 20 MEQ tablet, Take 2 tablets (40 mEq total) by mouth once daily., Disp: 60 tablet, Rfl: 3    predniSONE (DELTASONE) 5 MG tablet, Take 1 tablet (5 mg total) by mouth once daily., Disp: 30 tablet, Rfl: 11    promethazine-dextromethorphan (PROMETHAZINE-DM) 6.25-15 mg/5 mL Syrp, Take by mouth., Disp: , Rfl:     rosuvastatin (CRESTOR) 40 MG Tab, Take 1 tablet (40 mg total) by mouth once daily in the evening., Disp: 90 tablet, Rfl: 3    sacubitriL-valsartan (ENTRESTO)  mg per tablet, Take 1 tablet by mouth 2 (two) times daily., Disp: 60 tablet, Rfl: 3    tacrolimus (PROGRAF) 1 MG Cap, Take 4 capsules (4 mg total) by mouth every 12 (twelve) hours., Disp: 240 capsule, Rfl: 11    tamsulosin (FLOMAX) 0.4 mg Cap, Take 1 capsule (0.4 mg total) by mouth once daily., Disp: 30 capsule, Rfl: 11    triamcinolone acetonide 0.1% (KENALOG) 0.1 % ointment, Apply topically 2 (two) times daily. Use on bilateral lower legs., Disp: 454 g, Rfl: 1    Current Facility-Administered Medications:     acetaminophen tablet 650 mg, 650 mg, Oral, Once PRN, Sal Lopez MD    Review of patient's allergies indicates:   Allergen Reactions    Lisinopril Other (See Comments)     Other reaction(s):  cough    Actos  [pioglitazone] Other (See Comments)     Other " reaction(s): CHF    Metformin Other (See Comments)     Other reaction(s): renal insuff  Other reaction(s): CHF       ROS: Denies dizziness, headache, chest pain or shortness of breath  PE: Vital signs reviewed   GEN: Alert and oriented, no acute distress  HEART: Regular rate   LUNGS: Respirations unlabored    ASSESSMENT/PLAN:  Mitch was seen today for follow-up.    Diagnoses and all orders for this visit:    Chronic cough  -     Ambulatory referral/consult to Pulmonology; Future    Orthostatic dizziness  -     discussed necessary precautions with patient    Hypertension goal BP (blood pressure) < 130/80    Chronic combined systolic and diastolic heart failure    CKD IV    F/U after labs in January.

## 2022-11-15 LAB — TACROLIMUS BLD-MCNC: 5.2 NG/ML (ref 5–15)

## 2022-11-28 DIAGNOSIS — R05.8 ALLERGIC COUGH: ICD-10-CM

## 2022-11-28 NOTE — TELEPHONE ENCOUNTER
No new care gaps identified.  [facility] Embedded Care Gaps. Reference number: [id number]. 11/28/2022   2:38:54 PM CST

## 2022-11-29 NOTE — TELEPHONE ENCOUNTER
Refill Routing Note   Medication(s) are not appropriate for processing by [facility] for the following reason(s):      - Medication is a new start (<3 months)    [facility] action(s):  Defer          Medication reconciliation completed: No     Appointments  past 12m or future 3m with PCP    Date Provider   Last Visit   11/14/2022 Alix Kowalski DO   Next Visit   Visit date not found Alix Kowalski DO   ED visits in past 90 days: 0        Note composed:11:47 AM 11/29/2022

## 2022-11-30 RX ORDER — MONTELUKAST SODIUM 10 MG/1
10 TABLET ORAL NIGHTLY
Qty: 30 TABLET | Refills: 1 | Status: SHIPPED | OUTPATIENT
Start: 2022-11-30 | End: 2023-02-26 | Stop reason: SDUPTHER

## 2022-12-05 ENCOUNTER — HOSPITAL ENCOUNTER (OUTPATIENT)
Dept: RADIOLOGY | Facility: CLINIC | Age: 67
Discharge: HOME | End: 2022-12-05
Attending: NURSE PRACTITIONER
Payer: COMMERCIAL

## 2022-12-05 ENCOUNTER — OFFICE VISIT (OUTPATIENT)
Dept: URGENT CARE | Facility: CLINIC | Age: 67
End: 2022-12-05
Payer: COMMERCIAL

## 2022-12-05 VITALS
BODY MASS INDEX: 43.47 KG/M2 | RESPIRATION RATE: 18 BRPM | HEIGHT: 67 IN | OXYGEN SATURATION: 98 % | TEMPERATURE: 99 F | DIASTOLIC BLOOD PRESSURE: 99 MMHG | HEART RATE: 121 BPM | SYSTOLIC BLOOD PRESSURE: 133 MMHG | WEIGHT: 277 LBS
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
## 2022-12-05 DIAGNOSIS — M19.022 ARTHRITIS OF ELBOW, LEFT: ICD-10-CM

## 2022-12-05 DIAGNOSIS — S49.92XA ARM INJURY, LEFT, INITIAL ENCOUNTER: ICD-10-CM

## 2022-12-05 DIAGNOSIS — S63.502A LEFT WRIST SPRAIN, INITIAL ENCOUNTER: Primary | ICD-10-CM
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
## 2022-12-05 PROCEDURE — 3080F DIAST BP >= 90 MM HG: CPT | Mod: CPTII,S$GLB,, | Performed by: NURSE PRACTITIONER

## 2022-12-05 PROCEDURE — 1125F AMNT PAIN NOTED PAIN PRSNT: CPT | Mod: CPTII,S$GLB,, | Performed by: NURSE PRACTITIONER

## 2022-12-05 PROCEDURE — 1159F MED LIST DOCD IN RCRD: CPT | Mod: CPTII,S$GLB,, | Performed by: NURSE PRACTITIONER

## 2022-12-05 PROCEDURE — 3008F PR BODY MASS INDEX (BMI) DOCUMENTED: ICD-10-PCS | Mod: CPTII,S$GLB,, | Performed by: NURSE PRACTITIONER

## 2022-12-05 PROCEDURE — 73090 X-RAY EXAM OF FOREARM: CPT | Mod: LT,S$GLB,, | Performed by: RADIOLOGY

## 2022-12-05 PROCEDURE — 1159F PR MEDICATION LIST DOCUMENTED IN MEDICAL RECORD: ICD-10-PCS | Mod: CPTII,S$GLB,, | Performed by: NURSE PRACTITIONER

## 2022-12-05 PROCEDURE — 3008F BODY MASS INDEX DOCD: CPT | Mod: CPTII,S$GLB,, | Performed by: NURSE PRACTITIONER

## 2022-12-05 PROCEDURE — 3080F PR MOST RECENT DIASTOLIC BLOOD PRESSURE >= 90 MM HG: ICD-10-PCS | Mod: CPTII,S$GLB,, | Performed by: NURSE PRACTITIONER

## 2022-12-05 PROCEDURE — 3075F SYST BP GE 130 - 139MM HG: CPT | Mod: CPTII,S$GLB,, | Performed by: NURSE PRACTITIONER

## 2022-12-05 PROCEDURE — 3066F PR DOCUMENTATION OF TREATMENT FOR NEPHROPATHY: ICD-10-PCS | Mod: CPTII,S$GLB,, | Performed by: NURSE PRACTITIONER

## 2022-12-05 PROCEDURE — 1160F PR REVIEW ALL MEDS BY PRESCRIBER/CLIN PHARMACIST DOCUMENTED: ICD-10-PCS | Mod: CPTII,S$GLB,, | Performed by: NURSE PRACTITIONER

## 2022-12-05 PROCEDURE — 99213 OFFICE O/P EST LOW 20 MIN: CPT | Mod: S$GLB,,, | Performed by: NURSE PRACTITIONER

## 2022-12-05 PROCEDURE — 3075F PR MOST RECENT SYSTOLIC BLOOD PRESS GE 130-139MM HG: ICD-10-PCS | Mod: CPTII,S$GLB,, | Performed by: NURSE PRACTITIONER

## 2022-12-05 PROCEDURE — 4010F PR ACE/ARB THEARPY RXD/TAKEN: ICD-10-PCS | Mod: CPTII,S$GLB,, | Performed by: NURSE PRACTITIONER

## 2022-12-05 PROCEDURE — 4010F ACE/ARB THERAPY RXD/TAKEN: CPT | Mod: CPTII,S$GLB,, | Performed by: NURSE PRACTITIONER

## 2022-12-05 PROCEDURE — 99213 PR OFFICE/OUTPT VISIT, EST, LEVL III, 20-29 MIN: ICD-10-PCS | Mod: S$GLB,,, | Performed by: NURSE PRACTITIONER

## 2022-12-05 PROCEDURE — 3051F HG A1C>EQUAL 7.0%<8.0%: CPT | Mod: CPTII,S$GLB,, | Performed by: NURSE PRACTITIONER

## 2022-12-05 PROCEDURE — 3072F PR LOW RISK FOR RETINOPATHY: ICD-10-PCS | Mod: CPTII,S$GLB,, | Performed by: NURSE PRACTITIONER

## 2022-12-05 PROCEDURE — 3066F NEPHROPATHY DOC TX: CPT | Mod: CPTII,S$GLB,, | Performed by: NURSE PRACTITIONER

## 2022-12-05 PROCEDURE — 3072F LOW RISK FOR RETINOPATHY: CPT | Mod: CPTII,S$GLB,, | Performed by: NURSE PRACTITIONER

## 2022-12-05 PROCEDURE — 73090 XR FOREARM LEFT: ICD-10-PCS | Mod: LT,S$GLB,, | Performed by: RADIOLOGY

## 2022-12-05 PROCEDURE — 1160F RVW MEDS BY RX/DR IN RCRD: CPT | Mod: CPTII,S$GLB,, | Performed by: NURSE PRACTITIONER

## 2022-12-05 PROCEDURE — 3051F PR MOST RECENT HEMOGLOBIN A1C LEVEL 7.0 - < 8.0%: ICD-10-PCS | Mod: CPTII,S$GLB,, | Performed by: NURSE PRACTITIONER

## 2022-12-05 PROCEDURE — 1125F PR PAIN SEVERITY QUANTIFIED, PAIN PRESENT: ICD-10-PCS | Mod: CPTII,S$GLB,, | Performed by: NURSE PRACTITIONER

## 2022-12-05 PROCEDURE — 3061F NEG MICROALBUMINURIA REV: CPT | Mod: CPTII,S$GLB,, | Performed by: NURSE PRACTITIONER

## 2022-12-05 PROCEDURE — 3061F PR NEG MICROALBUMINURIA RESULT DOCUMENTED/REVIEW: ICD-10-PCS | Mod: CPTII,S$GLB,, | Performed by: NURSE PRACTITIONER

## 2022-12-05 NOTE — PROGRESS NOTES
"Subjective:       Patient ID: Mitch Whittaker is a 67 y.o. male.    Vitals:  height is 5' 7" (1.702 m) and weight is 125.6 kg (277 lb). His temperature is 98.7 °F (37.1 °C). His blood pressure is 133/99 (abnormal) and his pulse is 121 (abnormal). His respiration is 18 and oxygen saturation is 98%.     Chief Complaint: Arm Injury    Mitch Whittaker is a 67 year old male whom presents to the clinic with complaints of a fall on 11/29/22. The patient reports he was getting up from a sitting position when he tripped over a pair of shoes . He reports he tried to break his fall with his left hand. He reports left wrist pain extending up to the forearm. The patient states there is limited mobility in the injured wrist and arm. Pt rates the pain a 9/10 on the pain scale. The pain is located just below the elbow and radiates down his wrist. Pt has taken tylenol and used a hot patch for the pain with no relief.    Arm Injury   The incident occurred 5 to 7 days ago. The incident occurred at home. The injury mechanism was a fall. The pain is present in the left forearm and left wrist. The quality of the pain is described as aching and burning. The pain does not radiate. The pain is at a severity of 9/10. The pain is severe. The pain has been Constant since the incident. He has tried heat and acetaminophen for the symptoms. The treatment provided no relief.   ROS    Objective:      Physical Exam   Musculoskeletal:         General: Swelling, tenderness and signs of injury present. No deformity.      Right wrist: Normal.      Left wrist: He exhibits decreased range of motion, tenderness, bony tenderness and swelling. He exhibits no crepitus, no deformity and no laceration.      Right forearm: Normal.      Left forearm: He exhibits tenderness and bony tenderness. He exhibits no swelling and no edema.      Comments: Patient is able to move both the left wrist and arm but it is very painful. ROM both passive and active  are limited " due to increased pain.        XR FOREARM LEFT    Result Date: 12/5/2022  EXAMINATION: XR FOREARM LEFT CLINICAL HISTORY: Unspecified injury of left shoulder and upper arm, initial encounter TECHNIQUE: AP and lateral views of the left forearm were performed. COMPARISON: None FINDINGS: No acute fracture or dislocation is present of the left forearm.  Osteoarthritic changes are present of the elbow and radiocarpal articulations.  Vascular calcifications are present.  Enthesophyte noted at the triceps insertion.     Osteoarthritis without evidence of acute fracture or dislocation. Electronically signed by: Andrew Nicholson Date:    12/05/2022 Time:    12:08     Assessment:       1. Left wrist sprain, initial encounter    2. Fall at home, initial encounter    3. Arm injury, left, initial encounter    4. Arthritis of elbow, left          Plan:     Fosh injury, x-ray negative for any acute findings. Negative snuff box tenderness. Pre-dale splint placed with arm sling. Elevation encouraged. Ortho referral placed. Wife given the number to call and follow up on referral. Strict follow up/ Er protocol given. Patient is a kidney transplant patient. Instructed to use tylenol as needed for pain. The patient verbalized understanding and agrees with the discussed plan of care. Patient remained stable and was discharged in no acute distress.           Left wrist sprain, initial encounter  -     XR FOREARM LEFT; Future; Expected date: 12/05/2022  -     THUMB ORTHOSIS SPLINT UNIVERSAL FOR HOME USE  -     Ambulatory referral/consult to Orthopedics    Fall at home, initial encounter  -     XR FOREARM LEFT; Future; Expected date: 12/05/2022  -     THUMB ORTHOSIS SPLINT UNIVERSAL FOR HOME USE    Arm injury, left, initial encounter  -     XR FOREARM LEFT; Future; Expected date: 12/05/2022  -     Ambulatory referral/consult to Orthopedics  -     SLING FOR HOME USE    Arthritis of elbow, left  -     Ambulatory referral/consult to Orthopedics             Patient Instructions   Referral to Ortho for further evaluation/ treatment.    Continue taking tylenol as needed for pain. Elevate the extremity on pillows to help decrease swelling.   A referral has been placed for you to follow up with Ortho. Someone should be contacting you soon to set up that appointment. However, you may call [phone] at anytime to schedule this follow up appointment.    Please follow up with your Primary care provider within 2-5 days if your signs and symptoms have not resolved or worsen.     If your condition worsens or fails to improve we recommend that you receive another evaluation at the emergency room immediately or contact your primary medical clinic to discuss your concerns.   You must understand that you have received an Urgent Care treatment only and that you may be released before all of your medical problems are known or treated. You, the patient, will arrange for follow up care as instructed.     RED FLAGS/WARNING SYMPTOMS DISCUSSED WITH PATIENT THAT WOULD WARRANT EMERGENT MEDICAL ATTENTION. PATIENT VERBALIZED UNDERSTANDING.

## 2022-12-05 NOTE — TELEPHONE ENCOUNTER
No new care gaps identified.  [facility] Embedded Care Gaps. Reference number: [id number]. 12/05/2022   5:09:20 AM CST

## 2022-12-05 NOTE — PATIENT INSTRUCTIONS
Referral to Ortho for further evaluation/ treatment.    Continue taking tylenol as needed for pain. Elevate the extremity on pillows to help decrease swelling.   A referral has been placed for you to follow up with Ortho. Someone should be contacting you soon to set up that appointment. However, you may call [phone] at anytime to schedule this follow up appointment.    Please follow up with your Primary care provider within 2-5 days if your signs and symptoms have not resolved or worsen.     If your condition worsens or fails to improve we recommend that you receive another evaluation at the emergency room immediately or contact your primary medical clinic to discuss your concerns.   You must understand that you have received an Urgent Care treatment only and that you may be released before all of your medical problems are known or treated. You, the patient, will arrange for follow up care as instructed.     RED FLAGS/WARNING SYMPTOMS DISCUSSED WITH PATIENT THAT WOULD WARRANT EMERGENT MEDICAL ATTENTION. PATIENT VERBALIZED UNDERSTANDING.

## 2022-12-06 ENCOUNTER — OFFICE VISIT (OUTPATIENT)
Dept: ORTHOPEDICS | Facility: CLINIC | Age: 67
End: 2022-12-06
Payer: COMMERCIAL

## 2022-12-06 VITALS — BODY MASS INDEX: 43.47 KG/M2 | HEIGHT: 67 IN | WEIGHT: 277 LBS

## 2022-12-06 DIAGNOSIS — M25.532 LEFT WRIST PAIN: Primary | ICD-10-CM

## 2022-12-06 DIAGNOSIS — S52.515A NONDISPLACED FRACTURE OF LEFT RADIAL STYLOID PROCESS, INITIAL ENCOUNTER FOR CLOSED FRACTURE: Primary | ICD-10-CM

## 2022-12-06 PROCEDURE — 1100F PR PT FALLS ASSESS DOC 2+ FALLS/FALL W/INJURY/YR: ICD-10-PCS | Mod: CPTII,S$GLB,, | Performed by: ORTHOPAEDIC SURGERY

## 2022-12-06 PROCEDURE — 3061F PR NEG MICROALBUMINURIA RESULT DOCUMENTED/REVIEW: ICD-10-PCS | Mod: CPTII,S$GLB,, | Performed by: ORTHOPAEDIC SURGERY

## 2022-12-06 PROCEDURE — 99999 PR PBB SHADOW E&M-EST. PATIENT-LVL IV: ICD-10-PCS | Mod: PBBFAC,,, | Performed by: ORTHOPAEDIC SURGERY

## 2022-12-06 PROCEDURE — 1125F PR PAIN SEVERITY QUANTIFIED, PAIN PRESENT: ICD-10-PCS | Mod: CPTII,S$GLB,, | Performed by: ORTHOPAEDIC SURGERY

## 2022-12-06 PROCEDURE — 3008F PR BODY MASS INDEX (BMI) DOCUMENTED: ICD-10-PCS | Mod: CPTII,S$GLB,, | Performed by: ORTHOPAEDIC SURGERY

## 2022-12-06 PROCEDURE — 1125F AMNT PAIN NOTED PAIN PRSNT: CPT | Mod: CPTII,S$GLB,, | Performed by: ORTHOPAEDIC SURGERY

## 2022-12-06 PROCEDURE — 3072F PR LOW RISK FOR RETINOPATHY: ICD-10-PCS | Mod: CPTII,S$GLB,, | Performed by: ORTHOPAEDIC SURGERY

## 2022-12-06 PROCEDURE — 3066F PR DOCUMENTATION OF TREATMENT FOR NEPHROPATHY: ICD-10-PCS | Mod: CPTII,S$GLB,, | Performed by: ORTHOPAEDIC SURGERY

## 2022-12-06 PROCEDURE — 3051F HG A1C>EQUAL 7.0%<8.0%: CPT | Mod: CPTII,S$GLB,, | Performed by: ORTHOPAEDIC SURGERY

## 2022-12-06 PROCEDURE — 1159F MED LIST DOCD IN RCRD: CPT | Mod: CPTII,S$GLB,, | Performed by: ORTHOPAEDIC SURGERY

## 2022-12-06 PROCEDURE — 99999 PR PBB SHADOW E&M-EST. PATIENT-LVL IV: CPT | Mod: PBBFAC,,, | Performed by: ORTHOPAEDIC SURGERY

## 2022-12-06 PROCEDURE — 3061F NEG MICROALBUMINURIA REV: CPT | Mod: CPTII,S$GLB,, | Performed by: ORTHOPAEDIC SURGERY

## 2022-12-06 PROCEDURE — 3072F LOW RISK FOR RETINOPATHY: CPT | Mod: CPTII,S$GLB,, | Performed by: ORTHOPAEDIC SURGERY

## 2022-12-06 PROCEDURE — 1100F PTFALLS ASSESS-DOCD GE2>/YR: CPT | Mod: CPTII,S$GLB,, | Performed by: ORTHOPAEDIC SURGERY

## 2022-12-06 PROCEDURE — 4010F PR ACE/ARB THEARPY RXD/TAKEN: ICD-10-PCS | Mod: CPTII,S$GLB,, | Performed by: ORTHOPAEDIC SURGERY

## 2022-12-06 PROCEDURE — 99203 PR OFFICE/OUTPT VISIT, NEW, LEVL III, 30-44 MIN: ICD-10-PCS | Mod: S$GLB,,, | Performed by: ORTHOPAEDIC SURGERY

## 2022-12-06 PROCEDURE — 3051F PR MOST RECENT HEMOGLOBIN A1C LEVEL 7.0 - < 8.0%: ICD-10-PCS | Mod: CPTII,S$GLB,, | Performed by: ORTHOPAEDIC SURGERY

## 2022-12-06 PROCEDURE — 4010F ACE/ARB THERAPY RXD/TAKEN: CPT | Mod: CPTII,S$GLB,, | Performed by: ORTHOPAEDIC SURGERY

## 2022-12-06 PROCEDURE — 1160F RVW MEDS BY RX/DR IN RCRD: CPT | Mod: CPTII,S$GLB,, | Performed by: ORTHOPAEDIC SURGERY

## 2022-12-06 PROCEDURE — 3288F PR FALLS RISK ASSESSMENT DOCUMENTED: ICD-10-PCS | Mod: CPTII,S$GLB,, | Performed by: ORTHOPAEDIC SURGERY

## 2022-12-06 PROCEDURE — 1160F PR REVIEW ALL MEDS BY PRESCRIBER/CLIN PHARMACIST DOCUMENTED: ICD-10-PCS | Mod: CPTII,S$GLB,, | Performed by: ORTHOPAEDIC SURGERY

## 2022-12-06 PROCEDURE — 3288F FALL RISK ASSESSMENT DOCD: CPT | Mod: CPTII,S$GLB,, | Performed by: ORTHOPAEDIC SURGERY

## 2022-12-06 PROCEDURE — 3008F BODY MASS INDEX DOCD: CPT | Mod: CPTII,S$GLB,, | Performed by: ORTHOPAEDIC SURGERY

## 2022-12-06 PROCEDURE — 1159F PR MEDICATION LIST DOCUMENTED IN MEDICAL RECORD: ICD-10-PCS | Mod: CPTII,S$GLB,, | Performed by: ORTHOPAEDIC SURGERY

## 2022-12-06 PROCEDURE — 3066F NEPHROPATHY DOC TX: CPT | Mod: CPTII,S$GLB,, | Performed by: ORTHOPAEDIC SURGERY

## 2022-12-06 PROCEDURE — 99203 OFFICE O/P NEW LOW 30 MIN: CPT | Mod: S$GLB,,, | Performed by: ORTHOPAEDIC SURGERY

## 2022-12-06 RX ORDER — INSULIN ASPART 100 [IU]/ML
25 INJECTION, SOLUTION INTRAVENOUS; SUBCUTANEOUS
Qty: 30 ML | Refills: 0 | Status: SHIPPED | OUTPATIENT
Start: 2022-12-06 | End: 2023-01-16 | Stop reason: SDUPTHER

## 2022-12-06 NOTE — PROGRESS NOTES
Subjective:     Patient ID: Mitch Whittaker is a 67 y.o. male.    Chief Complaint: Pain and Injury of the Left Wrist      HPI:  The patient is a 67-year-old male who fell 11/29/2022 and injured his left wrist.  Radiographs showed a nondisplaced radial styloid fracture.    Past Medical History:   Diagnosis Date    Acquired renal cyst of left kidney     Anemia associated with chronic renal failure     CAD (coronary artery disease)     nonobstructive lhc 9/14    CHF (congestive heart failure)     Chronic immunosuppression with Prograf and MMF 06/18/2015    Chronic venous insufficiency of lower extremity     CKD (chronic kidney disease) stage 3, GFR 30-59 ml/min     Diabetic retinopathy     DM (diabetes mellitus), type 2 with complications 1994    Edema     End stage kidney disease     s/p transplant, doing well    Gallbladder polyp     Heart failure, diastolic, due to HTN     Hemodialysis status     off since transplant    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 RNA NEGATIVE 5/2015, 2021    History of colon polyps     HPTH (hyperparathyroidism)     Hyperlipidemia     Hypertension associated with stage 3 chronic kidney disease due to type 2 diabetes mellitus     LBBB (left bundle branch block) 12/20/2021    Morbid obesity with BMI of 45.0-49.9, adult     PCO (posterior capsular opacification), left 03/04/2019    Proteinuria     resolved s/p transplant    S/P kidney transplant     Sleep apnea     Type 2 diabetes, uncontrolled, with retinopathy     Type II diabetes mellitus with renal manifestations      Past Surgical History:   Procedure Laterality Date    CARDIAC CATHETERIZATION  2008    normal coronary    COLONOSCOPY N/A 4/5/2018    Procedure: COLONOSCOPY;  Surgeon: Chava Ronquillo MD;  Location: [facility];  Service: Endoscopy;  Laterality: N/A;    COLONOSCOPY N/A 5/2/2022    Procedure: COLONOSCOPY;  Surgeon: Alix Puente MD;  Location: [facility];  Service: Endoscopy;  Laterality:  "N/A;    KIDNEY TRANSPLANT      RETINAL LASER PROCEDURE       Family History   Problem Relation Age of Onset    Diabetes Mother     Hypertension Mother     Heart failure Mother     Kidney disease Sister         ESRD    Diabetes Sister     Diabetes Maternal Grandmother     Cancer Neg Hx      Social History     Socioeconomic History    Marital status:     Number of children: 2   Occupational History    Occupation: retired     Employer: Retired   Tobacco Use    Smoking status: Former     Types: Cigarettes     Quit date: 2013     Years since quittin.5    Smokeless tobacco: Former     Quit date: 2013    Tobacco comments:     used marijuana since 3034-9027, stopped after started dialysis   Substance and Sexual Activity    Alcohol use: No     Alcohol/week: 0.0 standard drinks    Drug use: No     Comment:      Sexual activity: Never   Social History Narrative    . Lives with spouse. Has 2 children. 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     Medication List with Changes/Refills   Current Medications    APIXABAN (ELIQUIS) 5 MG TAB    Take 1 tablet (5 mg total) by mouth 2 (two) times daily.    ASPIRIN (ECOTRIN) 81 MG EC TABLET    Take 1 tablet by mouth Daily.     BD ULTRA-FINE MINI PEN NEEDLE 31 GAUGE X 3/16" NDLE    Use to inject insulin as needed up to 4 times daily    BLOOD SUGAR DIAGNOSTIC STRP    Use to test four times per day    CALCITRIOL (ROCALTROL) 0.25 MCG CAP    Take 1 capsule (0.25 mcg total) by mouth once daily.    FAMOTIDINE (PEPCID) 20 MG TABLET    TAKE ONE TABLET BY MOUTH EVERY EVENING    FISH OIL-OMEGA-3 FATTY ACIDS 300-1,000 MG CAPSULE    Take 1 g by mouth once daily.    FUROSEMIDE (LASIX) 80 MG TABLET    Take one-half tablet (40 mg) by mouth 2 (two) times daily.    GLIMEPIRIDE (AMARYL) 2 MG TABLET    Take 1 tablet (2 mg total) by mouth before breakfast.    INSULIN (LANTUS SOLOSTAR U-100 INSULIN) GLARGINE 100 UNITS/ML SUBQ PEN    Inject 35 " "Units into the skin 2 (two) times daily.    INSULIN ASPART U-100 (NOVOLOG FLEXPEN U-100 INSULIN) 100 UNIT/ML (3 ML) INPN PEN    Inject 25 Units into the skin 3 (three) times daily with meals.    KETOCONAZOLE (NIZORAL) 200 MG TAB    Take HALF A tablet (100 mg total) by mouth once daily.    LANCETS 33 GAUGE MISC    1 Stick by Misc.(Non-Drug; Combo Route) route as directed. Contour lancets. Use to check BG 2-3 times daily.    MAGNESIUM OXIDE (MAG-OX) 400 MG (241.3 MG MAGNESIUM) TABLET    Take 1 tablet (400 mg total) by mouth once daily.    METOLAZONE (ZAROXOLYN) 2.5 MG TABLET    Take 1 tablet by mouth on Monday and Friday as directed    METOPROLOL SUCCINATE (TOPROL-XL) 25 MG 24 HR TABLET    Take 1 tablet (25 mg total) by mouth once daily.    MONTELUKAST (SINGULAIR) 10 MG TABLET    Take 1 tablet (10 mg total) by mouth every evening.    MULTIVITAMIN (THERAGRAN) TABLET    Take 1 tablet by mouth once daily.    MUPIROCIN (BACTROBAN) 2 % OINTMENT    Apply topically 3 (three) times daily. Use as directed on the leg wound.    MYCOPHENOLATE (CELLCEPT) 250 MG CAP    Take 3 capsules (750 mg total) by mouth 2 (two) times daily.    ONDANSETRON (ZOFRAN) 4 MG TABLET    Take 1 tablet (4 mg total) by mouth every 6 (six) hours.    PEN NEEDLE, DIABETIC (BD INSULIN PEN NEEDLE UF SHORT) 31 GAUGE X 5/16" NDLE    USE TO INJECT INSULIN TWICE A DAY    POTASSIUM CHLORIDE SA (K-DUR,KLOR-CON) 20 MEQ TABLET    Take 2 tablets (40 mEq total) by mouth once daily.    PREDNISONE (DELTASONE) 5 MG TABLET    Take 1 tablet (5 mg total) by mouth once daily.    PROMETHAZINE-DEXTROMETHORPHAN (PROMETHAZINE-DM) 6.25-15 MG/5 ML SYRP    Take by mouth.    ROSUVASTATIN (CRESTOR) 40 MG TAB    Take 1 tablet (40 mg total) by mouth once daily in the evening.    SACUBITRIL-VALSARTAN (ENTRESTO)  MG PER TABLET    Take 1 tablet by mouth 2 (two) times daily.    TACROLIMUS (PROGRAF) 1 MG CAP    Take 4 capsules (4 mg total) by mouth every 12 (twelve) hours.    " TAMSULOSIN (FLOMAX) 0.4 MG CAP    Take 1 capsule (0.4 mg total) by mouth once daily.    TRIAMCINOLONE ACETONIDE 0.1% (KENALOG) 0.1 % OINTMENT    Apply topically 2 (two) times daily. Use on bilateral lower legs.     Review of patient's allergies indicates:   Allergen Reactions    Lisinopril Other (See Comments)     Other reaction(s):  cough    Actos  [pioglitazone] Other (See Comments)     Other reaction(s): CHF    Metformin Other (See Comments)     Other reaction(s): renal insuff  Other reaction(s): CHF     Review of Systems   Constitutional: Negative for malaise/fatigue.   HENT:  Negative for hearing loss.    Eyes:  Positive for visual disturbance. Negative for double vision.   Cardiovascular:  Positive for chest pain.   Respiratory:  Positive for sleep disturbances due to breathing. Negative for shortness of breath.    Endocrine: Negative for cold intolerance.   Hematologic/Lymphatic: Does not bruise/bleed easily.   Skin:  Negative for poor wound healing and suspicious lesions.   Musculoskeletal:  Negative for gout, joint pain and joint swelling.   Gastrointestinal:  Negative for nausea and vomiting.   Genitourinary:  Positive for flank pain. Negative for dysuria.   Neurological:  Positive for numbness, paresthesias and sensory change.   Psychiatric/Behavioral:  Negative for depression, memory loss and substance abuse. The patient is not nervous/anxious.    Allergic/Immunologic: Negative for persistent infections.     Objective:   Body mass index is 43.38 kg/m².  There were no vitals filed for this visit.             General    Constitutional: He is oriented to person, place, and time. He appears well-developed and well-nourished. No distress.   HENT:   Head: Normocephalic.   Eyes: EOM are normal.   Pulmonary/Chest: Effort normal.   Neurological: He is oriented to person, place, and time.   Psychiatric: He has a normal mood and affect.         Left Hand/Wrist Exam     Inspection   Scars: Wrist - absent Hand -   absent  Effusion: Wrist - present Hand -  absent    Other     Sensory Exam  Median Distribution: normal  Ulnar Distribution: normal  Radial Distribution: normal    Comments:  The patient has tenderness and swelling low localized to the radial styloid left wrist.  There is pain at the extremes of motion.          Vascular Exam       Capillary Refill  Left Hand: normal capillary refill        Relevant imaging results reviewed and interpreted by me, discussed with the patient and / or family today radiographs left wrist showed a nondisplaced radial styloid fracture  Assessment:     Encounter Diagnosis   Name Primary?    Nondisplaced fracture of left radial styloid process, initial encounter for closed fracture Yes        Plan:       The patient will continue with his thumb spica splint will return in 6 weeks for re-x-ray left wrist              Disclaimer: This note was prepared using a voice recognition system and is likely to have sound alike errors within the text.

## 2022-12-06 NOTE — TELEPHONE ENCOUNTER
Refill Routing Note   Medication(s) are not appropriate for processing by [facility] for the following reason(s):      - Required laboratory values are abnormal    [facility] action(s):  Defer          Medication reconciliation completed: No     Appointments  past 12m or future 3m with PCP    Date Provider   Last Visit   11/14/2022 Alix Kowalski DO   Next Visit   Visit date not found Alix Kowalski DO   ED visits in past 90 days: 0        Note composed:10:17 PM 12/05/2022

## 2022-12-07 ENCOUNTER — HOSPITAL ENCOUNTER (INPATIENT)
Facility: HOSPITAL | Age: 67
LOS: 2 days | Discharge: HOME HEALTH | DRG: 699 | End: 2022-12-10
Attending: EMERGENCY MEDICINE | Admitting: HOSPITALIST
Payer: COMMERCIAL

## 2022-12-07 ENCOUNTER — OFFICE VISIT (OUTPATIENT)
Dept: INTERNAL MEDICINE | Facility: CLINIC | Age: 67
End: 2022-12-07
Payer: COMMERCIAL

## 2022-12-07 VITALS
SYSTOLIC BLOOD PRESSURE: 142 MMHG | DIASTOLIC BLOOD PRESSURE: 82 MMHG | OXYGEN SATURATION: 100 % | HEIGHT: 67 IN | TEMPERATURE: 98 F | RESPIRATION RATE: 20 BRPM | BODY MASS INDEX: 41.8 KG/M2 | HEART RATE: 105 BPM | WEIGHT: 266.31 LBS

## 2022-12-07 DIAGNOSIS — N17.9 AKI (ACUTE KIDNEY INJURY): ICD-10-CM

## 2022-12-07 DIAGNOSIS — R55 SYNCOPE: ICD-10-CM

## 2022-12-07 DIAGNOSIS — E11.29 TYPE II OR UNSPECIFIED TYPE DIABETES MELLITUS WITH RENAL MANIFESTATIONS, UNCONTROLLED(250.42): ICD-10-CM

## 2022-12-07 DIAGNOSIS — E11.3553 TYPE 2 DIABETES MELLITUS WITH STABLE PROLIFERATIVE RETINOPATHY OF BOTH EYES, WITH LONG-TERM CURRENT USE OF INSULIN: ICD-10-CM

## 2022-12-07 DIAGNOSIS — N17.9 ACUTE RENAL FAILURE SUPERIMPOSED ON CHRONIC KIDNEY DISEASE, UNSPECIFIED CKD STAGE, UNSPECIFIED ACUTE RENAL FAILURE TYPE: ICD-10-CM
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
## 2022-12-07 DIAGNOSIS — B25.1 CYTOMEGALIC INCLUSION VIRUS HEPATITIS: ICD-10-CM

## 2022-12-07 DIAGNOSIS — Z29.89 PROPHYLACTIC IMMUNOTHERAPY: Chronic | ICD-10-CM

## 2022-12-07 DIAGNOSIS — Z79.4 TYPE 2 DIABETES MELLITUS WITH STABLE PROLIFERATIVE RETINOPATHY OF BOTH EYES, WITH LONG-TERM CURRENT USE OF INSULIN: ICD-10-CM

## 2022-12-07 DIAGNOSIS — I87.2 CHRONIC VENOUS INSUFFICIENCY OF LOWER EXTREMITY: ICD-10-CM

## 2022-12-07 DIAGNOSIS — G47.33 OBSTRUCTIVE SLEEP APNEA SYNDROME: ICD-10-CM

## 2022-12-07 DIAGNOSIS — N17.9 ACUTE ON CHRONIC RENAL FAILURE: ICD-10-CM

## 2022-12-07 DIAGNOSIS — E11.65 TYPE II OR UNSPECIFIED TYPE DIABETES MELLITUS WITH RENAL MANIFESTATIONS, UNCONTROLLED(250.42): ICD-10-CM

## 2022-12-07 DIAGNOSIS — E11.22 HYPERTENSION ASSOCIATED WITH STAGE 3 CHRONIC KIDNEY DISEASE DUE TO TYPE 2 DIABETES MELLITUS: ICD-10-CM

## 2022-12-07 DIAGNOSIS — R42 DIZZINESS: ICD-10-CM

## 2022-12-07 DIAGNOSIS — N18.30 HYPERTENSION ASSOCIATED WITH STAGE 3 CHRONIC KIDNEY DISEASE DUE TO TYPE 2 DIABETES MELLITUS: ICD-10-CM

## 2022-12-07 DIAGNOSIS — Z94.0 KIDNEY TRANSPLANTED: ICD-10-CM

## 2022-12-07 DIAGNOSIS — E86.0 DEHYDRATION: ICD-10-CM

## 2022-12-07 DIAGNOSIS — E66.01 SEVERE OBESITY (BMI >= 40): ICD-10-CM

## 2022-12-07 DIAGNOSIS — I25.118 CORONARY ARTERY DISEASE OF NATIVE ARTERY OF NATIVE HEART WITH STABLE ANGINA PECTORIS: ICD-10-CM

## 2022-12-07 DIAGNOSIS — S09.90XA INJURY OF HEAD, INITIAL ENCOUNTER: Primary | ICD-10-CM

## 2022-12-07 DIAGNOSIS — E86.1 HYPOTENSION DUE TO HYPOVOLEMIA: ICD-10-CM

## 2022-12-07 DIAGNOSIS — I12.9 HYPERTENSION ASSOCIATED WITH STAGE 3 CHRONIC KIDNEY DISEASE DUE TO TYPE 2 DIABETES MELLITUS: ICD-10-CM

## 2022-12-07 DIAGNOSIS — R55 SYNCOPE AND COLLAPSE: ICD-10-CM

## 2022-12-07 DIAGNOSIS — I95.1 ORTHOSTATIC HYPOTENSION: ICD-10-CM

## 2022-12-07 DIAGNOSIS — D64.9 ANEMIA, UNSPECIFIED TYPE: Primary | ICD-10-CM

## 2022-12-07 DIAGNOSIS — N18.9 ACUTE ON CHRONIC RENAL FAILURE: ICD-10-CM

## 2022-12-07 DIAGNOSIS — R19.7 DIARRHEA OF PRESUMED INFECTIOUS ORIGIN: ICD-10-CM

## 2022-12-07 DIAGNOSIS — N18.9 ACUTE RENAL FAILURE SUPERIMPOSED ON CHRONIC KIDNEY DISEASE, UNSPECIFIED CKD STAGE, UNSPECIFIED ACUTE RENAL FAILURE TYPE: ICD-10-CM

## 2022-12-07 DIAGNOSIS — Z79.01 CHRONIC ANTICOAGULATION: ICD-10-CM

## 2022-12-07 PROBLEM — I95.89 HYPOTENSION DUE TO HYPOVOLEMIA: Status: ACTIVE | Noted: 2022-12-07

## 2022-12-07 LAB
ALBUMIN SERPL BCP-MCNC: 2.7 G/DL (ref 3.5–5.2)
ALP SERPL-CCNC: 56 U/L (ref 55–135)
ALT SERPL W/O P-5'-P-CCNC: 9 U/L (ref 10–44)
ANION GAP SERPL CALC-SCNC: 16 MMOL/L (ref 8–16)
AST SERPL-CCNC: 15 U/L (ref 10–40)
BACTERIA #/AREA URNS HPF: ABNORMAL /HPF
BASOPHILS NFR BLD: 0 % (ref 0–1.9)
BILIRUB SERPL-MCNC: 0.3 MG/DL (ref 0.1–1)
BILIRUB UR QL STRIP: NEGATIVE
BUN SERPL-MCNC: 74 MG/DL (ref 8–23)
CALCIUM SERPL-MCNC: 9.4 MG/DL (ref 8.7–10.5)
CHLORIDE SERPL-SCNC: 101 MMOL/L (ref 95–110)
CLARITY UR: ABNORMAL
CO2 SERPL-SCNC: 24 MMOL/L (ref 23–29)
COLOR UR: YELLOW
CREAT SERPL-MCNC: 4.3 MG/DL (ref 0.5–1.4)
DIFFERENTIAL METHOD: ABNORMAL
EOSINOPHIL NFR BLD: 0 % (ref 0–8)
ERYTHROCYTE [DISTWIDTH] IN BLOOD BY AUTOMATED COUNT: 13.1 % (ref 11.5–14.5)
EST. GFR  (NO RACE VARIABLE): 14 ML/MIN/1.73 M^2
GIANT PLATELETS BLD QL SMEAR: PRESENT
GLUCOSE SERPL-MCNC: 132 MG/DL (ref 70–110)
GLUCOSE UR QL STRIP: NEGATIVE
GRAN CASTS #/AREA URNS LPF: 128 /LPF
HCT VFR BLD AUTO: 28.2 % (ref 40–54)
HCV AB SERPL QL IA: POSITIVE
HEP C VIRUS HOLD SPECIMEN: NORMAL
HGB BLD-MCNC: 8.4 G/DL (ref 14–18)
HGB UR QL STRIP: NEGATIVE
HIV 1+2 AB+HIV1 P24 AG SERPL QL IA: NEGATIVE
HYALINE CASTS #/AREA URNS LPF: 10 /LPF
IMM GRANULOCYTES # BLD AUTO: ABNORMAL K/UL (ref 0–0.04)
IMM GRANULOCYTES NFR BLD AUTO: ABNORMAL % (ref 0–0.5)
KETONES UR QL STRIP: NEGATIVE
LACTATE SERPL-SCNC: 1.8 MMOL/L (ref 0.5–2.2)
LEUKOCYTE ESTERASE UR QL STRIP: NEGATIVE
LYMPHOCYTES NFR BLD: 7 % (ref 18–48)
MCH RBC QN AUTO: 25.2 PG (ref 27–31)
MCHC RBC AUTO-ENTMCNC: 29.8 G/DL (ref 32–36)
MCV RBC AUTO: 85 FL (ref 82–98)
METAMYELOCYTES NFR BLD MANUAL: 3 %
MICROSCOPIC COMMENT: ABNORMAL
MONOCYTES NFR BLD: 1 % (ref 4–15)
NEUTROPHILS NFR BLD: 87 % (ref 38–73)
NEUTS BAND NFR BLD MANUAL: 2 %
NITRITE UR QL STRIP: NEGATIVE
NRBC BLD-RTO: 0 /100 WBC
OVALOCYTES BLD QL SMEAR: ABNORMAL
PH UR STRIP: 6 [PH] (ref 5–8)
PLATELET # BLD AUTO: 222 K/UL (ref 150–450)
PMV BLD AUTO: 10.9 FL (ref 9.2–12.9)
POTASSIUM SERPL-SCNC: 4.1 MMOL/L (ref 3.5–5.1)
PROT SERPL-MCNC: 6.4 G/DL (ref 6–8.4)
PROT UR QL STRIP: ABNORMAL
RBC # BLD AUTO: 3.33 M/UL (ref 4.6–6.2)
RBC #/AREA URNS HPF: 1 /HPF (ref 0–4)
SCHISTOCYTES BLD QL SMEAR: ABNORMAL
SODIUM SERPL-SCNC: 141 MMOL/L (ref 136–145)
SP GR UR STRIP: 1.01 (ref 1–1.03)
TROPONIN I SERPL DL<=0.01 NG/ML-MCNC: 0.06 NG/ML (ref 0–0.03)
URN SPEC COLLECT METH UR: ABNORMAL
UROBILINOGEN UR STRIP-ACNC: NEGATIVE EU/DL
WBC # BLD AUTO: 6.11 K/UL (ref 3.9–12.7)
WBC #/AREA URNS HPF: 0 /HPF (ref 0–5)

## 2022-12-07 PROCEDURE — 3288F PR FALLS RISK ASSESSMENT DOCUMENTED: ICD-10-PCS | Mod: CPTII,S$GLB,, | Performed by: FAMILY MEDICINE

## 2022-12-07 PROCEDURE — 3066F NEPHROPATHY DOC TX: CPT | Mod: CPTII,S$GLB,, | Performed by: FAMILY MEDICINE

## 2022-12-07 PROCEDURE — G0378 HOSPITAL OBSERVATION PER HR: HCPCS

## 2022-12-07 PROCEDURE — 3061F NEG MICROALBUMINURIA REV: CPT | Mod: CPTII,S$GLB,, | Performed by: FAMILY MEDICINE

## 2022-12-07 PROCEDURE — 96361 HYDRATE IV INFUSION ADD-ON: CPT

## 2022-12-07 PROCEDURE — 3051F HG A1C>EQUAL 7.0%<8.0%: CPT | Mod: CPTII,S$GLB,, | Performed by: FAMILY MEDICINE

## 2022-12-07 PROCEDURE — 87389 HIV-1 AG W/HIV-1&-2 AB AG IA: CPT | Performed by: PHYSICIAN ASSISTANT

## 2022-12-07 PROCEDURE — 1159F PR MEDICATION LIST DOCUMENTED IN MEDICAL RECORD: ICD-10-PCS | Mod: CPTII,S$GLB,, | Performed by: FAMILY MEDICINE

## 2022-12-07 PROCEDURE — 3008F BODY MASS INDEX DOCD: CPT | Mod: CPTII,S$GLB,, | Performed by: FAMILY MEDICINE

## 2022-12-07 PROCEDURE — 3072F LOW RISK FOR RETINOPATHY: CPT | Mod: CPTII,S$GLB,, | Performed by: FAMILY MEDICINE

## 2022-12-07 PROCEDURE — 3066F PR DOCUMENTATION OF TREATMENT FOR NEPHROPATHY: ICD-10-PCS | Mod: CPTII,S$GLB,, | Performed by: FAMILY MEDICINE

## 2022-12-07 PROCEDURE — 3008F PR BODY MASS INDEX (BMI) DOCUMENTED: ICD-10-PCS | Mod: CPTII,S$GLB,, | Performed by: FAMILY MEDICINE

## 2022-12-07 PROCEDURE — 99213 OFFICE O/P EST LOW 20 MIN: CPT | Mod: S$GLB,,, | Performed by: FAMILY MEDICINE

## 2022-12-07 PROCEDURE — 3072F PR LOW RISK FOR RETINOPATHY: ICD-10-PCS | Mod: CPTII,S$GLB,, | Performed by: FAMILY MEDICINE

## 2022-12-07 PROCEDURE — 1159F MED LIST DOCD IN RCRD: CPT | Mod: CPTII,S$GLB,, | Performed by: FAMILY MEDICINE

## 2022-12-07 PROCEDURE — 3079F DIAST BP 80-89 MM HG: CPT | Mod: CPTII,S$GLB,, | Performed by: FAMILY MEDICINE

## 2022-12-07 PROCEDURE — 3061F PR NEG MICROALBUMINURIA RESULT DOCUMENTED/REVIEW: ICD-10-PCS | Mod: CPTII,S$GLB,, | Performed by: FAMILY MEDICINE

## 2022-12-07 PROCEDURE — 99999 PR PBB SHADOW E&M-EST. PATIENT-LVL V: ICD-10-PCS | Mod: PBBFAC,,, | Performed by: FAMILY MEDICINE

## 2022-12-07 PROCEDURE — 1126F PR PAIN SEVERITY QUANTIFIED, NO PAIN PRESENT: ICD-10-PCS | Mod: CPTII,S$GLB,, | Performed by: FAMILY MEDICINE

## 2022-12-07 PROCEDURE — 1160F RVW MEDS BY RX/DR IN RCRD: CPT | Mod: CPTII,S$GLB,, | Performed by: FAMILY MEDICINE

## 2022-12-07 PROCEDURE — 3079F PR MOST RECENT DIASTOLIC BLOOD PRESSURE 80-89 MM HG: ICD-10-PCS | Mod: CPTII,S$GLB,, | Performed by: FAMILY MEDICINE

## 2022-12-07 PROCEDURE — 85027 COMPLETE CBC AUTOMATED: CPT | Performed by: NURSE PRACTITIONER

## 2022-12-07 PROCEDURE — 86803 HEPATITIS C AB TEST: CPT | Performed by: PHYSICIAN ASSISTANT

## 2022-12-07 PROCEDURE — 3077F PR MOST RECENT SYSTOLIC BLOOD PRESSURE >= 140 MM HG: ICD-10-PCS | Mod: CPTII,S$GLB,, | Performed by: FAMILY MEDICINE

## 2022-12-07 PROCEDURE — 25000003 PHARM REV CODE 250: Performed by: EMERGENCY MEDICINE

## 2022-12-07 PROCEDURE — 80053 COMPREHEN METABOLIC PANEL: CPT | Performed by: NURSE PRACTITIONER

## 2022-12-07 PROCEDURE — 3288F FALL RISK ASSESSMENT DOCD: CPT | Mod: CPTII,S$GLB,, | Performed by: FAMILY MEDICINE

## 2022-12-07 PROCEDURE — 3077F SYST BP >= 140 MM HG: CPT | Mod: CPTII,S$GLB,, | Performed by: FAMILY MEDICINE

## 2022-12-07 PROCEDURE — 85007 BL SMEAR W/DIFF WBC COUNT: CPT | Performed by: NURSE PRACTITIONER

## 2022-12-07 PROCEDURE — 93010 EKG 12-LEAD: ICD-10-PCS | Mod: ,,, | Performed by: INTERNAL MEDICINE

## 2022-12-07 PROCEDURE — 99285 EMERGENCY DEPT VISIT HI MDM: CPT | Mod: 25

## 2022-12-07 PROCEDURE — 4010F ACE/ARB THERAPY RXD/TAKEN: CPT | Mod: CPTII,S$GLB,, | Performed by: FAMILY MEDICINE

## 2022-12-07 PROCEDURE — 84484 ASSAY OF TROPONIN QUANT: CPT | Performed by: EMERGENCY MEDICINE

## 2022-12-07 PROCEDURE — 87522 HEPATITIS C REVRS TRNSCRPJ: CPT | Performed by: PHYSICIAN ASSISTANT

## 2022-12-07 PROCEDURE — 99213 PR OFFICE/OUTPT VISIT, EST, LEVL III, 20-29 MIN: ICD-10-PCS | Mod: S$GLB,,, | Performed by: FAMILY MEDICINE

## 2022-12-07 PROCEDURE — 1101F PT FALLS ASSESS-DOCD LE1/YR: CPT | Mod: CPTII,S$GLB,, | Performed by: FAMILY MEDICINE

## 2022-12-07 PROCEDURE — 83605 ASSAY OF LACTIC ACID: CPT | Performed by: EMERGENCY MEDICINE

## 2022-12-07 PROCEDURE — 1160F PR REVIEW ALL MEDS BY PRESCRIBER/CLIN PHARMACIST DOCUMENTED: ICD-10-PCS | Mod: CPTII,S$GLB,, | Performed by: FAMILY MEDICINE

## 2022-12-07 PROCEDURE — 96360 HYDRATION IV INFUSION INIT: CPT

## 2022-12-07 PROCEDURE — 1126F AMNT PAIN NOTED NONE PRSNT: CPT | Mod: CPTII,S$GLB,, | Performed by: FAMILY MEDICINE

## 2022-12-07 PROCEDURE — 82962 GLUCOSE BLOOD TEST: CPT

## 2022-12-07 PROCEDURE — 93005 ELECTROCARDIOGRAM TRACING: CPT

## 2022-12-07 PROCEDURE — 81000 URINALYSIS NONAUTO W/SCOPE: CPT | Performed by: EMERGENCY MEDICINE

## 2022-12-07 PROCEDURE — 3051F PR MOST RECENT HEMOGLOBIN A1C LEVEL 7.0 - < 8.0%: ICD-10-PCS | Mod: CPTII,S$GLB,, | Performed by: FAMILY MEDICINE

## 2022-12-07 PROCEDURE — 4010F PR ACE/ARB THEARPY RXD/TAKEN: ICD-10-PCS | Mod: CPTII,S$GLB,, | Performed by: FAMILY MEDICINE

## 2022-12-07 PROCEDURE — 1101F PR PT FALLS ASSESS DOC 0-1 FALLS W/OUT INJ PAST YR: ICD-10-PCS | Mod: CPTII,S$GLB,, | Performed by: FAMILY MEDICINE

## 2022-12-07 PROCEDURE — 93010 ELECTROCARDIOGRAM REPORT: CPT | Mod: ,,, | Performed by: INTERNAL MEDICINE

## 2022-12-07 PROCEDURE — 99999 PR PBB SHADOW E&M-EST. PATIENT-LVL V: CPT | Mod: PBBFAC,,, | Performed by: FAMILY MEDICINE

## 2022-12-07 RX ORDER — ASPIRIN 81 MG/1
81 TABLET ORAL DAILY
Status: DISCONTINUED | OUTPATIENT
Start: 2022-12-08 | End: 2022-12-10 | Stop reason: HOSPADM

## 2022-12-07 RX ORDER — AMOXICILLIN 250 MG
1 CAPSULE ORAL 2 TIMES DAILY
Status: DISCONTINUED | OUTPATIENT
Start: 2022-12-08 | End: 2022-12-10 | Stop reason: HOSPADM

## 2022-12-07 RX ORDER — METOPROLOL SUCCINATE 25 MG/1
25 TABLET, EXTENDED RELEASE ORAL DAILY
Status: DISCONTINUED | OUTPATIENT
Start: 2022-12-08 | End: 2022-12-10 | Stop reason: HOSPADM

## 2022-12-07 RX ORDER — SODIUM CHLORIDE 9 MG/ML
1000 INJECTION, SOLUTION INTRAVENOUS CONTINUOUS
Status: ACTIVE | OUTPATIENT
Start: 2022-12-07 | End: 2022-12-08

## 2022-12-07 RX ORDER — ACETAMINOPHEN 325 MG/1
650 TABLET ORAL EVERY 4 HOURS PRN
Status: DISCONTINUED | OUTPATIENT
Start: 2022-12-08 | End: 2022-12-10 | Stop reason: HOSPADM

## 2022-12-07 RX ORDER — PRAVASTATIN SODIUM 20 MG/1
80 TABLET ORAL NIGHTLY
Status: DISCONTINUED | OUTPATIENT
Start: 2022-12-08 | End: 2022-12-10 | Stop reason: HOSPADM

## 2022-12-07 RX ORDER — IPRATROPIUM BROMIDE AND ALBUTEROL SULFATE 2.5; .5 MG/3ML; MG/3ML
3 SOLUTION RESPIRATORY (INHALATION) EVERY 6 HOURS PRN
Status: DISCONTINUED | OUTPATIENT
Start: 2022-12-08 | End: 2022-12-10 | Stop reason: HOSPADM

## 2022-12-07 RX ORDER — ONDANSETRON 2 MG/ML
4 INJECTION INTRAMUSCULAR; INTRAVENOUS EVERY 8 HOURS PRN
Status: DISCONTINUED | OUTPATIENT
Start: 2022-12-08 | End: 2022-12-10 | Stop reason: HOSPADM

## 2022-12-07 RX ORDER — CALCITRIOL 0.25 UG/1
0.25 CAPSULE ORAL DAILY
Status: DISCONTINUED | OUTPATIENT
Start: 2022-12-08 | End: 2022-12-10 | Stop reason: HOSPADM

## 2022-12-07 RX ORDER — TACROLIMUS 1 MG/1
4 CAPSULE ORAL 2 TIMES DAILY
Status: DISCONTINUED | OUTPATIENT
Start: 2022-12-07 | End: 2022-12-10 | Stop reason: HOSPADM

## 2022-12-07 RX ORDER — IBUPROFEN 200 MG
16 TABLET ORAL
Status: DISCONTINUED | OUTPATIENT
Start: 2022-12-08 | End: 2022-12-10 | Stop reason: HOSPADM

## 2022-12-07 RX ORDER — TALC
6 POWDER (GRAM) TOPICAL NIGHTLY PRN
Status: DISCONTINUED | OUTPATIENT
Start: 2022-12-08 | End: 2022-12-10 | Stop reason: HOSPADM

## 2022-12-07 RX ORDER — GLUCAGON 1 MG
1 KIT INJECTION
Status: DISCONTINUED | OUTPATIENT
Start: 2022-12-08 | End: 2022-12-10 | Stop reason: HOSPADM

## 2022-12-07 RX ORDER — IBUPROFEN 200 MG
24 TABLET ORAL
Status: DISCONTINUED | OUTPATIENT
Start: 2022-12-08 | End: 2022-12-10 | Stop reason: HOSPADM

## 2022-12-07 RX ORDER — SODIUM CHLORIDE 0.9 % (FLUSH) 0.9 %
10 SYRINGE (ML) INJECTION EVERY 12 HOURS PRN
Status: DISCONTINUED | OUTPATIENT
Start: 2022-12-08 | End: 2022-12-10 | Stop reason: HOSPADM

## 2022-12-07 RX ORDER — MYCOPHENOLATE MOFETIL 250 MG/1
750 CAPSULE ORAL 2 TIMES DAILY
Status: DISCONTINUED | OUTPATIENT
Start: 2022-12-07 | End: 2022-12-10 | Stop reason: HOSPADM

## 2022-12-07 RX ORDER — HYDROCODONE BITARTRATE AND ACETAMINOPHEN 5; 325 MG/1; MG/1
1 TABLET ORAL EVERY 6 HOURS PRN
Status: DISCONTINUED | OUTPATIENT
Start: 2022-12-08 | End: 2022-12-10 | Stop reason: HOSPADM

## 2022-12-07 RX ORDER — NALOXONE HCL 0.4 MG/ML
0.02 VIAL (ML) INJECTION
Status: DISCONTINUED | OUTPATIENT
Start: 2022-12-08 | End: 2022-12-10 | Stop reason: HOSPADM

## 2022-12-07 RX ORDER — TAMSULOSIN HYDROCHLORIDE 0.4 MG/1
1 CAPSULE ORAL DAILY
Status: DISCONTINUED | OUTPATIENT
Start: 2022-12-08 | End: 2022-12-10 | Stop reason: HOSPADM

## 2022-12-07 RX ORDER — PREDNISONE 5 MG/1
5 TABLET ORAL DAILY
Status: DISCONTINUED | OUTPATIENT
Start: 2022-12-08 | End: 2022-12-10 | Stop reason: HOSPADM

## 2022-12-07 RX ADMIN — SODIUM CHLORIDE 1000 ML: 0.9 INJECTION, SOLUTION INTRAVENOUS at 11:12

## 2022-12-07 RX ADMIN — SODIUM CHLORIDE 1000 ML: 0.9 INJECTION, SOLUTION INTRAVENOUS at 05:12

## 2022-12-07 NOTE — ED PROVIDER NOTES
SCRIBE #1 NOTE: I, Joe Maldonado, am scribing for, and in the presence of, Carlos Eduardo Paredes Jr., MD. I have scribed the HPI, ROS, and PEx.     SCRIBE #2 NOTE: I, Yaima Anderson, am scribing for, and in the presence of,  Andrew Red MD. I have scribed the remaining portions of the note not scribed by Scribe #1.    History     Chief Complaint   Patient presents with    Fall     Sent by Dr Caal for CT scan. Pt had syncopal episode last night, states he hit his head. C/o dizziness this morning. Pt had a fall earlier this week. +blood thinners       Review of patient's allergies indicates:   Allergen Reactions    Lisinopril Other (See Comments)     Other reaction(s):  cough    Actos  [pioglitazone] Other (See Comments)     Other reaction(s): CHF    Metformin Other (See Comments)     Other reaction(s): renal insuff  Other reaction(s): CHF         History of Present Illness     HPI    12/7/2022, 4:53 PM  History obtained from the patient      History of Present Illness: Mitch Whittaker is a 67 y.o. male patient with a PMHx of CHF, HTN, diabetes type 2 who presents to the Emergency Department for evaluation of fall which onset last night. Pt was sent by Dr Caal for a CT scan after he had a syncopal episode last night. Pt is also on Eliquis. Symptoms are constant and moderate in severity. No mitigating or exacerbating factors reported. Associated sxs include blood in stool, cough, dizziness. Patient denies any CP, dysuria, SOB, n/v, fever, numbness, and all other sxs at this time. No further complaints or concerns at this time.       Arrival mode: Personal vehicle     PCP: Alix Kowalski DO        Past Medical History:  Past Medical History:   Diagnosis Date    Acquired renal cyst of left kidney     Anemia associated with chronic renal failure     CAD (coronary artery disease)     nonobstructive c 9/14    CHF (congestive heart failure)     Chronic immunosuppression with Prograf and MMF 06/18/2015    Chronic venous  insufficiency of lower extremity     CKD (chronic kidney disease) stage 3, GFR 30-59 ml/min     Diabetic retinopathy     DM (diabetes mellitus), type 2 with complications 1994    Edema     End stage kidney disease     s/p transplant, doing well    Gallbladder polyp     Heart failure, diastolic, due to HTN     Hemodialysis status     off since transplant    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 RNA NEGATIVE 2021    History of colon polyps     HPTH (hyperparathyroidism)     Hyperlipidemia     Hypertension associated with stage 3 chronic kidney disease due to type 2 diabetes mellitus     LBBB (left bundle branch block) 2021    Morbid obesity with BMI of 45.0-49.9, adult     PCO (posterior capsular opacification), left 2019    Proteinuria     resolved s/p transplant    S/P kidney transplant     Sleep apnea     Type 2 diabetes, uncontrolled, with retinopathy     Type II diabetes mellitus with renal manifestations        Past Surgical History:  Past Surgical History:   Procedure Laterality Date    CARDIAC CATHETERIZATION      normal coronary    COLONOSCOPY N/A 2018    Procedure: COLONOSCOPY;  Surgeon: Chava Ronquillo MD;  Location: [facility] ENDO;  Service: Endoscopy;  Laterality: N/A;    COLONOSCOPY N/A 2022    Procedure: COLONOSCOPY;  Surgeon: Alix Puente MD;  Location: [facility] ENDO;  Service: Endoscopy;  Laterality: N/A;    KIDNEY TRANSPLANT      RETINAL LASER PROCEDURE           Family History:  Family History   Problem Relation Age of Onset    Diabetes Mother     Hypertension Mother     Heart failure Mother     Kidney disease Sister         ESRD    Diabetes Sister     Diabetes Maternal Grandmother     Cancer Neg Hx        Social History:  Social History     Tobacco Use    Smoking status: Former     Types: Cigarettes     Quit date: 2013     Years since quittin.5    Smokeless tobacco: Former     Quit date: 2013    Tobacco comments:     used  marijuana since [id number], stopped after started dialysis   Substance and Sexual Activity    Alcohol use: No     Alcohol/week: 0.0 standard drinks    Drug use: No     Comment:      Sexual activity: Never        Review of Systems     Review of Systems   Constitutional:  Negative for fever.   HENT:  Negative for sore throat.    Respiratory:  Positive for cough. Negative for shortness of breath.    Cardiovascular:  Negative for chest pain.   Gastrointestinal:  Positive for blood in stool. Negative for nausea and vomiting.   Genitourinary:  Negative for dysuria.   Musculoskeletal:  Negative for back pain.   Skin:  Negative for rash.   Neurological:  Positive for dizziness and syncope. Negative for weakness.   Hematological:  Does not bruise/bleed easily.   All other systems reviewed and are negative.     Physical Exam     Initial Vitals   BP Pulse Resp Temp SpO2   12/07/22 1634 12/07/22 1630 12/07/22 1630 12/07/22 1630 12/07/22 1630   (!) 86/52 99 (!) 22 98.9 °F (37.2 °C) 98 %      MAP       --                 Physical Exam  Nursing Notes and Vital Signs Reviewed.  Constitutional: Patient is in no acute distress. Well-developed and well-nourished.  Head: Atraumatic. Normocephalic.  Eyes:  EOM intact.  No scleral icterus.  ENT: Mucous membranes are moist.  Nares clear   Neck:  Full ROM. No JVD.  Cardiovascular: Regular rate. Regular rhythm No murmurs, rubs, or gallops. Distal pulses are 2+ and symmetric  Pulmonary/Chest: No respiratory distress. Clear to auscultation bilaterally. No wheezing or rales.  Equal chest wall rise bilaterally  Abdominal: Soft and non-distended.  There is no tenderness.  No rebound, guarding, or rigidity. Good bowel sounds.  Genitourinary: No CVA tenderness.  No suprapubic tenderness  Musculoskeletal: Moves all extremities. No obvious deformities.  5 x 5 strength in all extremities   Skin: Warm and dry.  Neurological:  Alert, awake, and appropriate.  Normal speech.  No acute focal neurological  deficits are appreciated.  Two through 12 intact bilaterally.  Psychiatric: Normal affect. Good eye contact. Appropriate in content.       ED Course   Critical Care    Date/Time: 12/7/2022 4:58 PM  Performed by: Carlos Eduardo Paredes Jr., MD  Authorized by: Carlos Eduardo Paredes Jr., MD   Direct patient critical care time: 16 minutes  Additional history critical care time: 4 minutes  Ordering / reviewing critical care time: 8 minutes  Documentation critical care time: 3 minutes  Consulting other physicians critical care time: 4 minutes  Total critical care time (exclusive of procedural time) : 35 minutes  Critical care time was exclusive of separately billable procedures and treating other patients and teaching time.  Critical care was necessary to treat or prevent imminent or life-threatening deterioration of the following conditions: hypotension.  Critical care was time spent personally by me on the following activities: blood draw for specimens, development of treatment plan with patient or surrogate, discussions with consultants, interpretation of cardiac output measurements, evaluation of patient's response to treatment, examination of patient, obtaining history from patient or surrogate, ordering and performing treatments and interventions, ordering and review of laboratory studies, ordering and review of radiographic studies, pulse oximetry, re-evaluation of patient's condition and review of old charts.      ED Vital Signs:  Vitals:    12/07/22 1630 12/07/22 1634 12/07/22 1747 12/07/22 1802   BP:  (!) 86/52 (!) 147/73 134/66   Pulse: 99  96 96   Resp: (!) 22      Temp: 98.9 °F (37.2 °C)      TempSrc: Oral      SpO2: 98%  99% 100%   Weight:        12/07/22 1903 12/07/22 1934 12/07/22 1939 12/07/22 2002   BP: (!) 126/58 111/61  (!) 106/58   Pulse: 91 94  91   Resp: (!) 21      Temp:   98.4 °F (36.9 °C)    TempSrc:   Oral    SpO2:  100%     Weight:   121.6 kg (268 lb 1.3 oz)     12/07/22 2101 12/07/22 2202   BP: 130/66  122/64   Pulse: 91 92   Resp: (!) 23 19   Temp:     TempSrc:     SpO2:     Weight:         Abnormal Lab Results:  Labs Reviewed   HEPATITIS C ANTIBODY - Abnormal; Notable for the following components:       Result Value    Hepatitis C Ab Positive (*)     All other components within normal limits    Narrative:     Release to patient->Immediate   CBC W/ AUTO DIFFERENTIAL - Abnormal; Notable for the following components:    RBC 3.33 (*)     Hemoglobin 8.4 (*)     Hematocrit 28.2 (*)     MCH 25.2 (*)     MCHC 29.8 (*)     Gran % 87.0 (*)     Lymph % 7.0 (*)     Mono % 1.0 (*)     All other components within normal limits    Narrative:     Release to patient->Immediate   COMPREHENSIVE METABOLIC PANEL - Abnormal; Notable for the following components:    Glucose 132 (*)     BUN 74 (*)     Creatinine 4.3 (*)     Albumin 2.7 (*)     ALT 9 (*)     eGFR 14 (*)     All other components within normal limits    Narrative:     Release to patient->Immediate   TROPONIN I - Abnormal; Notable for the following components:    Troponin I 0.064 (*)     All other components within normal limits   URINALYSIS, REFLEX TO URINE CULTURE - Abnormal; Notable for the following components:    Appearance, UA Hazy (*)     Protein, UA 1+ (*)     All other components within normal limits    Narrative:     Specimen Source->Urine   URINALYSIS MICROSCOPIC - Abnormal; Notable for the following components:    Hyaline Casts, UA 10 (*)     Granular Casts,  (*)     All other components within normal limits    Narrative:     Specimen Source->Urine   HIV 1 / 2 ANTIBODY    Narrative:     Release to patient->Immediate   HEP C VIRUS HOLD SPECIMEN    Narrative:     Release to patient->Immediate   LACTIC ACID, PLASMA   URINALYSIS   TROPONIN I        All Lab Results:  Results for orders placed or performed during the hospital encounter of 12/07/22   HIV 1/2 Ag/Ab (4th Gen)   Result Value Ref Range    HIV 1/2 Ag/Ab Negative Negative   Hepatitis C Antibody    Result Value Ref Range    Hepatitis C Ab Positive (A) Negative   HCV Virus Hold Specimen   Result Value Ref Range    HEP C Virus Hold Specimen Hold for HCV sendout    CBC auto differential   Result Value Ref Range    WBC 6.11 3.90 - 12.70 K/uL    RBC 3.33 (L) 4.60 - 6.20 M/uL    Hemoglobin 8.4 (L) 14.0 - 18.0 g/dL    Hematocrit 28.2 (L) 40.0 - 54.0 %    MCV 85 82 - 98 fL    MCH 25.2 (L) 27.0 - 31.0 pg    MCHC 29.8 (L) 32.0 - 36.0 g/dL    RDW 13.1 11.5 - 14.5 %    Platelets 222 150 - 450 K/uL    MPV 10.9 9.2 - 12.9 fL    Immature Granulocytes CANCELED 0.0 - 0.5 %    Immature Grans (Abs) CANCELED 0.00 - 0.04 K/uL    nRBC 0 0 /100 WBC    Gran % 87.0 (H) 38.0 - 73.0 %    Lymph % 7.0 (L) 18.0 - 48.0 %    Mono % 1.0 (L) 4.0 - 15.0 %    Eosinophil % 0.0 0.0 - 8.0 %    Basophil % 0.0 0.0 - 1.9 %    Bands 2.0 %    Metamyelocytes 3.0 %    Ovalocytes Occasional     Large/Giant Platelets Present     Fragmented Cells Occasional     Differential Method Manual    Comprehensive metabolic panel   Result Value Ref Range    Sodium 141 136 - 145 mmol/L    Potassium 4.1 3.5 - 5.1 mmol/L    Chloride 101 95 - 110 mmol/L    CO2 24 23 - 29 mmol/L    Glucose 132 (H) 70 - 110 mg/dL    BUN 74 (H) 8 - 23 mg/dL    Creatinine 4.3 (H) 0.5 - 1.4 mg/dL    Calcium 9.4 8.7 - 10.5 mg/dL    Total Protein 6.4 6.0 - 8.4 g/dL    Albumin 2.7 (L) 3.5 - 5.2 g/dL    Total Bilirubin 0.3 0.1 - 1.0 mg/dL    Alkaline Phosphatase 56 55 - 135 U/L    AST 15 10 - 40 U/L    ALT 9 (L) 10 - 44 U/L    Anion Gap 16 8 - 16 mmol/L    eGFR 14 (A) >60 mL/min/1.73 m^2   Lactic acid, plasma   Result Value Ref Range    Lactate (Lactic Acid) 1.8 0.5 - 2.2 mmol/L   Troponin I   Result Value Ref Range    Troponin I 0.064 (H) 0.000 - 0.026 ng/mL   Urinalysis, Reflex to Urine Culture Urine, Clean Catch    Specimen: Urine   Result Value Ref Range    Specimen UA Urine, Clean Catch     Color, UA Yellow Yellow, Straw, Nikki    Appearance, UA Hazy (A) Clear    pH, UA 6.0 5.0 - 8.0     Specific Gravity, UA 1.015 1.005 - 1.030    Protein, UA 1+ (A) Negative    Glucose, UA Negative Negative    Ketones, UA Negative Negative    Bilirubin (UA) Negative Negative    Occult Blood UA Negative Negative    Nitrite, UA Negative Negative    Urobilinogen, UA Negative <2.0 EU/dL    Leukocytes, UA Negative Negative   Urinalysis Microscopic   Result Value Ref Range    RBC, UA 1 0 - 4 /hpf    WBC, UA 0 0 - 5 /hpf    Bacteria None None-Occ /hpf    Hyaline Casts, UA 10 (A) 0-1/lpf /lpf    Granular Casts,  (A) None /lpf    Microscopic Comment SEE COMMENT      *Note: Due to a large number of results and/or encounters for the requested time period, some results have not been displayed. A complete set of results can be found in Results Review.         Imaging Results:  Imaging Results              CT Head Without Contrast (Final result)  Result time 12/07/22 18:18:52      Final result by Phylicia Sanabria MD (12/07/22 18:18:52)                   Impression:    SMALL CYST  No acute abnormality.    Atrophy and chronic white matter changes is soft soft    All CT scans   are performed using dose optimization techniques including the following: automated exposure control; adjustment of the mA and/or kV; use of iterative reconstruction technique.  Dose modulation was employed for ALARA by means of: Automated exposure control; adjustment of the mA and/or kV according to patient size (this includes techniques or standardized protocols for targeted exams where dose is matched to indication/reason for exam; i.e. extremities or head); and/or use of iterative reconstructive technique.      Electronically signed by: Daniele Whatley  Date:    12/07/2022  Time:    18:18               Narrative:    EXAMINATION:  CT HEAD WITHOUT CONTRAST    CLINICAL HISTORY:  Head trauma, coagulopathy (Age 19-64y);    TECHNIQUE:  Low dose axial CT images obtained throughout the head without intravenous contrast. Sagittal and coronal reconstructions were  performed.    COMPARISON:  None.    FINDINGS:  Atrophy and chronic white matter changes small    No extra-axial blood or fluid collections.    No parenchymal mass, hemorrhage, edema or major vascular distribution infarct.    Skull/extracranial contents (limited evaluation): No fracture. Mastoid air cells and paranasal sinuses are essentially clear.                                       X-Ray Chest 1 View (Final result)  Result time 12/07/22 17:28:54      Final result by Phylicia Sanabria MD (12/07/22 17:28:54)                   Impression:      As above      Electronically signed by: Daniele Whatley  Date:    12/07/2022  Time:    17:28               Narrative:    EXAMINATION:  XR CHEST 1 VIEW    CLINICAL HISTORY:  Syncope and collapse    TECHNIQUE:  Single frontal view of the chest was performed.    COMPARISON:  None    FINDINGS:  Low lung volumes.  Cardiomegaly.  Mild central pulmonary vascular crowding.  Right axillary vascular stent.  Correlate clinically for element of CHF.    Bones are intact.                                       The EKG was ordered, reviewed, and independently interpreted by the ED provider.  Interpretation time: 1728  Rate: 100 BPM  Rhythm: normal sinus rhythm  Interpretation: Left axis deviation Left bundle branch block. No STEMI.             The Emergency Provider reviewed the vital signs and test results, which are outlined above.     ED Discussion       8:00 PM: Dr. Paredes transfers care of patient to Dr. Red pending lab results.    9:21 PM: Evaluated pt. Pt is resting comfortably and is in no acute distress.  D/w pt all pertinent results. D/w pt any concerns expressed at this time. Answered all questions. Pt expresses understanding at this time.    10:09 PM: Discussed pt's case with Bozena Laughlin NP (Hospital Medicine) who recommends consulting Nephrology.    10:27 PM: Discussed pt's case with Dr. Pittman (Neprhology) who recommends sodium chloride 75 an hour and checking a bladder scan.  Dr. Pittman is agreeable to the pt being admitted at this facility, in [facility], at this time.    10:48 PM: Discussed case with Bozena Laughlin NP (Hospital Medicine). Dr. Cao agrees with current care and management of pt and accepts admission.   Admitting Service: Hospital Medicine  Admitting Physician: Dr. Cao  Admit to: Obs Med Tele    10:49 PM: Re-evaluated pt. I have discussed test results, shared treatment plan, and the need for admission with patient and family at bedside. Pt and family express understanding at this time and agree with all information. All questions answered. Pt and family have no further questions or concerns at this time. Pt is ready for admit.         Medical Decision Making:   Clinical Tests:   Lab Tests: Ordered and Reviewed  Radiological Study: Ordered and Reviewed  Medical Tests: Ordered and Reviewed         ED Medication(s):  Medications   0.9%  NaCl infusion (has no administration in time range)   sodium chloride 0.9% bolus 1,000 mL (0 mLs Intravenous Stopped 12/7/22 1910)       New Prescriptions    No medications on file               Scribe Attestation:   Scribe #1: I performed the above scribed service and the documentation accurately describes the services I performed. I attest to the accuracy of the note.     Attending:   Physician Attestation Statement for Scribe #1: I, Carlos Eduardo Paredes Jr., MD, personally performed the services described in this documentation, as scribed by Joe Maldonado, in my presence, and it is both accurate and complete.       Scribe Attestation:   Scribe #2: I performed the above scribed service and the documentation accurately describes the services I performed. I attest to the accuracy of the note.    Attending Attestation:           Physician Attestation for Scribe:    Physician Attestation Statement for Scribe #2: I, Andrew Red MD, reviewed documentation, as scribed by Yaima Anderson in my presence, and it is both accurate and complete. I  also acknowledge and confirm the content of the note done by Anna #1.         Clinical Impression       ICD-10-CM ICD-9-CM   1. Anemia, unspecified type  D64.9 285.9   2. Syncope  R55 780.2   3. Acute renal failure superimposed on chronic kidney disease, unspecified CKD stage, unspecified acute renal failure type  N17.9 584.9    N18.9 585.9   4. Kidney transplanted  Z94.0 V42.0   5. Acute on chronic renal failure  N17.9 584.9    N18.9 585.9       Disposition:   Disposition: Placed in Observation  Condition: Rico Red MD  12/07/22 0551

## 2022-12-07 NOTE — FIRST PROVIDER EVALUATION
Medical screening examination initiated.  I have conducted a focused provider triage encounter, findings are as follows:    Brief history of present illness:  Patient sent by primary care after he is had several falls also hit his head and is on anticoagulants.    Vitals:    12/07/22 1630 12/07/22 1634   BP:  (!) 86/52   BP Location:  Left arm   Patient Position:  Sitting   Pulse: 99    Resp: (!) 22    Temp: 98.9 °F (37.2 °C)    TempSrc: Oral    SpO2: 98%        Pertinent physical exam:  No acute distress hypotensive in triage    Brief workup plan:  Lab work CT    Preliminary workup initiated; this workup will be continued and followed by the physician or advanced practice provider that is assigned to the patient when roomed.

## 2022-12-07 NOTE — PROGRESS NOTES
Subjective:       Patient ID: Mitch Whittaker is a 67 y.o. male.    Chief Complaint: Dizziness (Patient states his been lightheaded lately and seems its getting worse. ) and Fall (Bump his head. )    PCP: Dr. Kowalski    Patient is new to me. Patient presents to clinic today reporting fall last night with head injury. Reports his blood sugar dropped which caused him to fall. Also reports worsening dizziness lately. His wife is present with him today. Patient and his wife are otherwise without concerns today.      Review of Systems   Constitutional:  Negative for chills, fatigue, fever and unexpected weight change.   Eyes:  Negative for visual disturbance.   Respiratory:  Negative for shortness of breath.    Cardiovascular:  Negative for chest pain.   Musculoskeletal:  Negative for myalgias.   Neurological:  Positive for dizziness. Negative for headaches.       Objective:      Physical Exam  Vitals reviewed.   Constitutional:       General: He is not in acute distress.     Appearance: He is well-developed.   HENT:      Head: Normocephalic and atraumatic.   Eyes:      General: Lids are normal. No scleral icterus.     Extraocular Movements: Extraocular movements intact.      Conjunctiva/sclera: Conjunctivae normal.      Pupils: Pupils are equal, round, and reactive to light.   Pulmonary:      Effort: Pulmonary effort is normal.   Neurological:      Mental Status: He is alert and oriented to person, place, and time.      Comments: Sitting in wheelchair   Psychiatric:         Mood and Affect: Mood and affect normal.       Assessment:       1. Injury of head, initial encounter    2. Dizziness    3. Chronic anticoagulation    4. Fall, initial encounter        Plan:   1. Injury of head, initial encounter  -     Refer to Emergency Dept.    2. Dizziness  -     Refer to Emergency Dept.    3. Chronic anticoagulation  -     Refer to Emergency Dept.    4. Fall, initial encounter    Advised patient and his wife that he needs to  proceed to the emergency department for further evaluation.  Advised he is to have a head CT due to head injury and chronic anticoagulation.  Advised they can evaluate for life-threatening causes of dizziness.  Otherwise, advised follow-up with Dr. Kowalski' team once he is released.  Patient and his wife expressed understanding and agreement with plan.

## 2022-12-07 NOTE — ED NOTES
Pt BIB EMS c/o fall on yesterday. Unknown LOC. Pt reports dizziness this morning. Denies any blurred vision. Pt takes blood thinners. Per family pt fell last week as well. Denies any pain. No deformities noted. Pt AAOx4 VSS. PERRLA. No lacerations noted. Pt with hx of renal transplant. Hypotensive upon arrival. Placed on cardiac and O2 monitor. EKG completed and shown. Will continue to monitor.

## 2022-12-08 PROBLEM — I95.1 ORTHOSTATIC HYPOTENSION: Status: ACTIVE | Noted: 2022-12-08

## 2022-12-08 PROBLEM — E86.0 DEHYDRATION: Status: ACTIVE | Noted: 2022-12-08

## 2022-12-08 PROBLEM — R55 SYNCOPE AND COLLAPSE: Status: ACTIVE | Noted: 2022-12-08

## 2022-12-08 PROBLEM — E66.01 SEVERE OBESITY (BMI >= 40): Status: ACTIVE | Noted: 2022-12-08

## 2022-12-08 PROBLEM — Z94.0 KIDNEY TRANSPLANTED: Status: ACTIVE | Noted: 2022-12-08

## 2022-12-08 PROBLEM — N17.9 AKI (ACUTE KIDNEY INJURY): Status: ACTIVE | Noted: 2022-12-08

## 2022-12-08 PROBLEM — R19.7 DIARRHEA: Status: ACTIVE | Noted: 2022-12-08

## 2022-12-08 LAB
ANION GAP SERPL CALC-SCNC: 14 MMOL/L (ref 8–16)
BASOPHILS # BLD AUTO: 0.01 K/UL (ref 0–0.2)
BASOPHILS NFR BLD: 0.2 % (ref 0–1.9)
BUN SERPL-MCNC: 74 MG/DL (ref 8–23)
CALCIUM SERPL-MCNC: 8.8 MG/DL (ref 8.7–10.5)
CHLORIDE SERPL-SCNC: 102 MMOL/L (ref 95–110)
CO2 SERPL-SCNC: 24 MMOL/L (ref 23–29)
CREAT SERPL-MCNC: 4.2 MG/DL (ref 0.5–1.4)
DIFFERENTIAL METHOD: ABNORMAL
EOSINOPHIL # BLD AUTO: 0 K/UL (ref 0–0.5)
EOSINOPHIL NFR BLD: 0.5 % (ref 0–8)
ERYTHROCYTE [DISTWIDTH] IN BLOOD BY AUTOMATED COUNT: 13.2 % (ref 11.5–14.5)
EST. GFR  (NO RACE VARIABLE): 15 ML/MIN/1.73 M^2
GLUCOSE SERPL-MCNC: 165 MG/DL (ref 70–110)
HCT VFR BLD AUTO: 26.4 % (ref 40–54)
HGB BLD-MCNC: 7.8 G/DL (ref 14–18)
IMM GRANULOCYTES # BLD AUTO: 0.1 K/UL (ref 0–0.04)
IMM GRANULOCYTES NFR BLD AUTO: 2.3 % (ref 0–0.5)
LYMPHOCYTES # BLD AUTO: 0.6 K/UL (ref 1–4.8)
LYMPHOCYTES NFR BLD: 14.4 % (ref 18–48)
MCH RBC QN AUTO: 25.5 PG (ref 27–31)
MCHC RBC AUTO-ENTMCNC: 29.5 G/DL (ref 32–36)
MCV RBC AUTO: 86 FL (ref 82–98)
MONOCYTES # BLD AUTO: 0.4 K/UL (ref 0.3–1)
MONOCYTES NFR BLD: 9.6 % (ref 4–15)
NEUTROPHILS # BLD AUTO: 3.2 K/UL (ref 1.8–7.7)
NEUTROPHILS NFR BLD: 73 % (ref 38–73)
NRBC BLD-RTO: 0 /100 WBC
PLATELET # BLD AUTO: 196 K/UL (ref 150–450)
PMV BLD AUTO: 10.8 FL (ref 9.2–12.9)
POCT GLUCOSE: 125 MG/DL (ref 70–110)
POCT GLUCOSE: 127 MG/DL (ref 70–110)
POCT GLUCOSE: 138 MG/DL (ref 70–110)
POCT GLUCOSE: 190 MG/DL (ref 70–110)
POCT GLUCOSE: 240 MG/DL (ref 70–110)
POTASSIUM SERPL-SCNC: 4 MMOL/L (ref 3.5–5.1)
RBC # BLD AUTO: 3.06 M/UL (ref 4.6–6.2)
SODIUM SERPL-SCNC: 140 MMOL/L (ref 136–145)
TROPONIN I SERPL DL<=0.01 NG/ML-MCNC: 0.06 NG/ML (ref 0–0.03)
WBC # BLD AUTO: 4.37 K/UL (ref 3.9–12.7)

## 2022-12-08 PROCEDURE — 63600175 PHARM REV CODE 636 W HCPCS: Performed by: NURSE PRACTITIONER

## 2022-12-08 PROCEDURE — 83993 ASSAY FOR CALPROTECTIN FECAL: CPT | Performed by: NURSE PRACTITIONER

## 2022-12-08 PROCEDURE — 87045 FECES CULTURE AEROBIC BACT: CPT | Performed by: NURSE PRACTITIONER

## 2022-12-08 PROCEDURE — 99223 1ST HOSP IP/OBS HIGH 75: CPT | Mod: ,,, | Performed by: INTERNAL MEDICINE

## 2022-12-08 PROCEDURE — 27000207 HC ISOLATION

## 2022-12-08 PROCEDURE — 89055 LEUKOCYTE ASSESSMENT FECAL: CPT | Performed by: NURSE PRACTITIONER

## 2022-12-08 PROCEDURE — 96361 HYDRATE IV INFUSION ADD-ON: CPT

## 2022-12-08 PROCEDURE — 86645 CMV ANTIBODY IGM: CPT | Performed by: NURSE PRACTITIONER

## 2022-12-08 PROCEDURE — 87324 CLOSTRIDIUM AG IA: CPT | Performed by: NURSE PRACTITIONER

## 2022-12-08 PROCEDURE — 36415 COLL VENOUS BLD VENIPUNCTURE: CPT | Performed by: NURSE PRACTITIONER

## 2022-12-08 PROCEDURE — 25000003 PHARM REV CODE 250: Performed by: NURSE PRACTITIONER

## 2022-12-08 PROCEDURE — 87496 CYTOMEG DNA AMP PROBE: CPT | Performed by: NURSE PRACTITIONER

## 2022-12-08 PROCEDURE — 87328 CRYPTOSPORIDIUM AG IA: CPT | Performed by: NURSE PRACTITIONER

## 2022-12-08 PROCEDURE — 84484 ASSAY OF TROPONIN QUANT: CPT | Performed by: NURSE PRACTITIONER

## 2022-12-08 PROCEDURE — 99223 PR INITIAL HOSPITAL CARE,LEVL III: ICD-10-PCS | Mod: ,,, | Performed by: INTERNAL MEDICINE

## 2022-12-08 PROCEDURE — 87496 CYTOMEG DNA AMP PROBE: CPT | Mod: 91 | Performed by: NURSE PRACTITIONER

## 2022-12-08 PROCEDURE — 87522 HEPATITIS C REVRS TRNSCRPJ: CPT | Performed by: NURSE PRACTITIONER

## 2022-12-08 PROCEDURE — 80048 BASIC METABOLIC PNL TOTAL CA: CPT | Performed by: NURSE PRACTITIONER

## 2022-12-08 PROCEDURE — 87425 ROTAVIRUS AG IA: CPT | Performed by: NURSE PRACTITIONER

## 2022-12-08 PROCEDURE — 87209 SMEAR COMPLEX STAIN: CPT | Performed by: NURSE PRACTITIONER

## 2022-12-08 PROCEDURE — 11000001 HC ACUTE MED/SURG PRIVATE ROOM

## 2022-12-08 PROCEDURE — 87046 STOOL CULTR AEROBIC BACT EA: CPT | Performed by: NURSE PRACTITIONER

## 2022-12-08 PROCEDURE — 82653 EL-1 FECAL QUANTITATIVE: CPT | Performed by: NURSE PRACTITIONER

## 2022-12-08 PROCEDURE — 82272 OCCULT BLD FECES 1-3 TESTS: CPT | Performed by: NURSE PRACTITIONER

## 2022-12-08 PROCEDURE — 87329 GIARDIA AG IA: CPT | Performed by: NURSE PRACTITIONER

## 2022-12-08 PROCEDURE — 85025 COMPLETE CBC W/AUTO DIFF WBC: CPT | Performed by: NURSE PRACTITIONER

## 2022-12-08 PROCEDURE — 87177 OVA AND PARASITES SMEARS: CPT | Performed by: NURSE PRACTITIONER

## 2022-12-08 PROCEDURE — 87427 SHIGA-LIKE TOXIN AG IA: CPT | Mod: 59 | Performed by: NURSE PRACTITIONER

## 2022-12-08 PROCEDURE — 21400001 HC TELEMETRY ROOM

## 2022-12-08 PROCEDURE — 82705 FATS/LIPIDS FECES QUAL: CPT | Performed by: NURSE PRACTITIONER

## 2022-12-08 RX ORDER — GLUCAGON 1 MG
1 KIT INJECTION
Status: DISCONTINUED | OUTPATIENT
Start: 2022-12-08 | End: 2022-12-10 | Stop reason: HOSPADM

## 2022-12-08 RX ORDER — LANCETS
100 EACH MISCELLANEOUS
Qty: 100 EACH | Refills: 11 | Status: SHIPPED | OUTPATIENT
Start: 2022-12-08 | End: 2022-12-10 | Stop reason: SDUPTHER

## 2022-12-08 RX ORDER — DEXTROSE 4 G
1 TABLET,CHEWABLE ORAL 4 TIMES DAILY
Qty: 1 EACH | Refills: 0 | Status: SHIPPED | OUTPATIENT
Start: 2022-12-08 | End: 2022-12-10 | Stop reason: SDUPTHER

## 2022-12-08 RX ORDER — INSULIN GLARGINE 100 [IU]/ML
35 INJECTION, SOLUTION SUBCUTANEOUS 2 TIMES DAILY
Qty: 30 ML | Refills: 0 | Status: SHIPPED | OUTPATIENT
Start: 2022-12-08 | End: 2023-01-18 | Stop reason: SDUPTHER

## 2022-12-08 RX ORDER — IBUPROFEN 200 MG
24 TABLET ORAL
Status: DISCONTINUED | OUTPATIENT
Start: 2022-12-08 | End: 2022-12-10 | Stop reason: HOSPADM

## 2022-12-08 RX ORDER — IBUPROFEN 200 MG
16 TABLET ORAL
Status: DISCONTINUED | OUTPATIENT
Start: 2022-12-08 | End: 2022-12-10 | Stop reason: HOSPADM

## 2022-12-08 RX ORDER — INSULIN ASPART 100 [IU]/ML
0-5 INJECTION, SOLUTION INTRAVENOUS; SUBCUTANEOUS
Status: DISCONTINUED | OUTPATIENT
Start: 2022-12-08 | End: 2022-12-10 | Stop reason: HOSPADM

## 2022-12-08 RX ADMIN — TACROLIMUS 4 MG: 1 CAPSULE ORAL at 12:12

## 2022-12-08 RX ADMIN — MYCOPHENOLATE MOFETIL 750 MG: 250 CAPSULE ORAL at 09:12

## 2022-12-08 RX ADMIN — SODIUM CHLORIDE 1000 ML: 0.9 INJECTION, SOLUTION INTRAVENOUS at 11:12

## 2022-12-08 RX ADMIN — PREDNISONE 5 MG: 5 TABLET ORAL at 08:12

## 2022-12-08 RX ADMIN — MYCOPHENOLATE MOFETIL 750 MG: 250 CAPSULE ORAL at 08:12

## 2022-12-08 RX ADMIN — TACROLIMUS 4 MG: 1 CAPSULE ORAL at 06:12

## 2022-12-08 RX ADMIN — HYDROCODONE BITARTRATE AND ACETAMINOPHEN 1 TABLET: 5; 325 TABLET ORAL at 04:12

## 2022-12-08 RX ADMIN — CALCITRIOL CAPSULES 0.25 MCG 0.25 MCG: 0.25 CAPSULE ORAL at 08:12

## 2022-12-08 RX ADMIN — APIXABAN 5 MG: 2.5 TABLET, FILM COATED ORAL at 12:12

## 2022-12-08 RX ADMIN — PRAVASTATIN SODIUM 80 MG: 20 TABLET ORAL at 09:12

## 2022-12-08 RX ADMIN — SENNOSIDES AND DOCUSATE SODIUM 1 TABLET: 50; 8.6 TABLET ORAL at 09:12

## 2022-12-08 RX ADMIN — TAMSULOSIN HYDROCHLORIDE 0.4 MG: 0.4 CAPSULE ORAL at 08:12

## 2022-12-08 RX ADMIN — METOPROLOL SUCCINATE 25 MG: 25 TABLET, EXTENDED RELEASE ORAL at 08:12

## 2022-12-08 RX ADMIN — APIXABAN 5 MG: 2.5 TABLET, FILM COATED ORAL at 09:12

## 2022-12-08 RX ADMIN — MYCOPHENOLATE MOFETIL 750 MG: 250 CAPSULE ORAL at 12:12

## 2022-12-08 RX ADMIN — TACROLIMUS 4 MG: 1 CAPSULE ORAL at 07:12

## 2022-12-08 RX ADMIN — APIXABAN 5 MG: 2.5 TABLET, FILM COATED ORAL at 08:12

## 2022-12-08 RX ADMIN — ASPIRIN 81 MG: 81 TABLET, COATED ORAL at 08:12

## 2022-12-08 NOTE — SUBJECTIVE & OBJECTIVE
Past Medical History:   Diagnosis Date    Acquired renal cyst of left kidney     Anemia associated with chronic renal failure     CAD (coronary artery disease)     nonobstructive [facility] 9/14    CHF (congestive heart failure)     Chronic immunosuppression with Prograf and MMF 06/18/2015    Chronic venous insufficiency of lower extremity     CKD (chronic kidney disease) stage 3, GFR 30-59 ml/min     Diabetic retinopathy     DM (diabetes mellitus), type 2 with complications 1994    Edema     End stage kidney disease     s/p transplant, doing well    Gallbladder polyp     Heart failure, diastolic, due to HTN     Hemodialysis status     off since transplant    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 RNA NEGATIVE 5/2015, 2021    History of colon polyps     HPTH (hyperparathyroidism)     Hyperlipidemia     Hypertension associated with stage 3 chronic kidney disease due to type 2 diabetes mellitus     LBBB (left bundle branch block) 12/20/2021    Morbid obesity with BMI of 45.0-49.9, adult     PCO (posterior capsular opacification), left 03/04/2019    Proteinuria     resolved s/p transplant    S/P kidney transplant     Sleep apnea     Type 2 diabetes, uncontrolled, with retinopathy     Type II diabetes mellitus with renal manifestations        Past Surgical History:   Procedure Laterality Date    CARDIAC CATHETERIZATION  2008    normal coronary    COLONOSCOPY N/A 4/5/2018    Procedure: COLONOSCOPY;  Surgeon: Chava Ronquillo MD;  Location: [facility];  Service: Endoscopy;  Laterality: N/A;    COLONOSCOPY N/A 5/2/2022    Procedure: COLONOSCOPY;  Surgeon: Alix Puente MD;  Location: [facility];  Service: Endoscopy;  Laterality: N/A;    KIDNEY TRANSPLANT      RETINAL LASER PROCEDURE         Review of patient's allergies indicates:   Allergen Reactions    Lisinopril Other (See Comments)     Other reaction(s):  cough    Actos  [pioglitazone] Other (See Comments)     Other reaction(s): CHF     "Metformin Other (See Comments)     Other reaction(s): renal insuff  Other reaction(s): CHF       Current Facility-Administered Medications on File Prior to Encounter   Medication    acetaminophen tablet 650 mg     Current Outpatient Medications on File Prior to Encounter   Medication Sig    apixaban (ELIQUIS) 5 mg Tab Take 1 tablet (5 mg total) by mouth 2 (two) times daily.    aspirin (ECOTRIN) 81 MG EC tablet Take 1 tablet by mouth Daily.     furosemide (LASIX) 80 MG tablet Take one-half tablet (40 mg) by mouth 2 (two) times daily.    tacrolimus (PROGRAF) 1 MG Cap Take 4 capsules (4 mg total) by mouth every 12 (twelve) hours.    BD ULTRA-FINE MINI PEN NEEDLE 31 gauge x 3/16" Ndle Use to inject insulin as needed up to 4 times daily    blood sugar diagnostic Strp Use to test four times per day (Patient taking differently: Use to test four times per day)    calcitRIOL (ROCALTROL) 0.25 MCG Cap Take 1 capsule (0.25 mcg total) by mouth once daily.    famotidine (PEPCID) 20 MG tablet TAKE ONE TABLET BY MOUTH EVERY EVENING    fish oil-omega-3 fatty acids 300-1,000 mg capsule Take 1 g by mouth once daily.    glimepiride (AMARYL) 2 MG tablet Take 1 tablet (2 mg total) by mouth before breakfast.    insulin (LANTUS SOLOSTAR U-100 INSULIN) glargine 100 units/mL SubQ pen Inject 35 Units into the skin 2 (two) times daily.    insulin aspart U-100 (NOVOLOG FLEXPEN U-100 INSULIN) 100 unit/mL (3 mL) InPn pen Inject 25 Units into the skin 3 (three) times daily with meals.    ketoconazole (NIZORAL) 200 mg Tab Take HALF A tablet (100 mg total) by mouth once daily.    lancets 33 gauge Misc 1 Stick by Misc.(Non-Drug; Combo Route) route as directed. Contour lancets. Use to check BG 2-3 times daily.    magnesium oxide (MAG-OX) 400 mg (241.3 mg magnesium) tablet Take 1 tablet (400 mg total) by mouth once daily.    metOLazone (ZAROXOLYN) 2.5 MG tablet Take 1 tablet by mouth on Monday and Friday as directed    metoprolol succinate (TOPROL-XL) " "25 MG 24 hr tablet Take 1 tablet (25 mg total) by mouth once daily.    montelukast (SINGULAIR) 10 mg tablet Take 1 tablet (10 mg total) by mouth every evening.    multivitamin (THERAGRAN) tablet Take 1 tablet by mouth once daily.    mupirocin (BACTROBAN) 2 % ointment Apply topically 3 (three) times daily. Use as directed on the leg wound.    mycophenolate (CELLCEPT) 250 mg Cap Take 3 capsules (750 mg total) by mouth 2 (two) times daily.    ondansetron (ZOFRAN) 4 MG tablet Take 1 tablet (4 mg total) by mouth every 6 (six) hours.    pen needle, diabetic (BD INSULIN PEN NEEDLE UF SHORT) 31 gauge x 5/16" Ndle USE TO INJECT INSULIN TWICE A DAY    potassium chloride SA (K-DUR,KLOR-CON) 20 MEQ tablet Take 2 tablets (40 mEq total) by mouth once daily.    predniSONE (DELTASONE) 5 MG tablet Take 1 tablet (5 mg total) by mouth once daily.    promethazine-dextromethorphan (PROMETHAZINE-DM) 6.25-15 mg/5 mL Syrp Take by mouth.    rosuvastatin (CRESTOR) 40 MG Tab Take 1 tablet (40 mg total) by mouth once daily in the evening.    sacubitriL-valsartan (ENTRESTO)  mg per tablet Take 1 tablet by mouth 2 (two) times daily.    tamsulosin (FLOMAX) 0.4 mg Cap Take 1 capsule (0.4 mg total) by mouth once daily.    triamcinolone acetonide 0.1% (KENALOG) 0.1 % ointment Apply topically 2 (two) times daily. Use on bilateral lower legs.     Family History       Problem Relation (Age of Onset)    Diabetes Mother, Sister, Maternal Grandmother    Heart failure Mother    Hypertension Mother    Kidney disease Sister          Tobacco Use    Smoking status: Former     Types: Cigarettes     Quit date: 2013     Years since quittin.5    Smokeless tobacco: Former     Quit date: 2013    Tobacco comments:     used marijuana since [id number], stopped after started dialysis   Substance and Sexual Activity    Alcohol use: No     Alcohol/week: 0.0 standard drinks    Drug use: No     Comment:      Sexual activity: Never     Review of Systems "   Constitutional:  Positive for appetite change.   Respiratory:  Positive for cough (chronic).    Endocrine:        Low blood sugar   Neurological:         Fall   Objective:     Vital Signs (Most Recent):  Temp: 98.4 °F (36.9 °C) (12/07/22 1939)  Pulse: 92 (12/07/22 2202)  Resp: 19 (12/07/22 2202)  BP: 116/63 (12/07/22 2302)  SpO2: 100 % (12/07/22 1934) Vital Signs (24h Range):  Temp:  [97.5 °F (36.4 °C)-98.9 °F (37.2 °C)] 98.4 °F (36.9 °C)  Pulse:  [] 92  Resp:  [19-23] 19  SpO2:  [98 %-100 %] 100 %  BP: ()/(52-82) 116/63     Weight: 121.6 kg (268 lb 1.3 oz)  Body mass index is 41.99 kg/m².    Physical Exam  Vitals and nursing note reviewed.   HENT:      Head: Normocephalic.   Cardiovascular:      Rate and Rhythm: Normal rate and regular rhythm.      Pulses: Normal pulses.      Heart sounds: Normal heart sounds.   Pulmonary:      Effort: Pulmonary effort is normal.      Breath sounds: Normal breath sounds. No wheezing.   Abdominal:      General: Bowel sounds are normal. There is no distension.      Palpations: Abdomen is soft.      Tenderness: There is no abdominal tenderness.   Musculoskeletal:         General: Swelling (+2 BLE) present.   Skin:     General: Skin is warm and dry.   Neurological:      Mental Status: He is alert and oriented to person, place, and time.           Significant Labs: All pertinent labs within the past 24 hours have been reviewed.    Significant Imaging: I have reviewed all pertinent imaging results/findings within the past 24 hours.

## 2022-12-08 NOTE — TELEPHONE ENCOUNTER
No new care gaps identified.  [facility] Embedded Care Gaps. Reference number: [id number]. 12/08/2022   5:09:05 AM CST

## 2022-12-08 NOTE — ASSESSMENT & PLAN NOTE
-Baseline creatinine per chart review ranges between 2 and 3, today on admission 4.3  -patient with history of renal transplant in 2015, currently on Prograf CellCept and prednisone  -case discussed with Nephrology in the ED and recommended maintenance fluids  -nephrology consulted  -will repeat labs in a.m.   -continue to avoid nephrotoxic agents and renal dose meds as able

## 2022-12-08 NOTE — H&P
[facility] - Emergency Dept.  [facility] Medicine  History & Physical    Patient Name: Mitch Whittaker  MRN: [id number]  Patient Class: OP- Observation  Admission Date: 12/7/2022  Attending Physician: Bernardo Cao MD   Primary Care Provider: Alix Kowalski DO         Patient information was obtained from patient and ER records.     Subjective:     Principal Problem:Acute on chronic renal failure    Chief Complaint:   Chief Complaint   Patient presents with    Fall     Sent by Dr Caal for CT scan. Pt had syncopal episode last night, states he hit his head. C/o dizziness this morning. Pt had a fall earlier this week. +blood thinners          HPI: Mr. Whittaker is a 67-year-old male with a history of  HFrEF 40-45%, CKD, DM2, MARION, HTN, HLD, ESRD s/p renal transplant in 2015 who presents to the ED today after a fall at home yesterday.  Patient states he has had decreased intake over the last 2 days, yesterday he checked his blood sugar and it was 25, so he got up to get something to eat and fell to the floor.  He states he did hit his head.  He denied any dizziness or lightheadedness prior to fall.  Patient did not go to the ER that time, he made an appointment with his PCP who saw him earlier today and advised that he report to the ER d/t concern for possible bleed.  Upon arrival patient was noted to have blood pressures in the 80s/50s. CT head was done which showed no acute intracranial processes.  Lab work with a hemoglobin of 8.4 per (seems to be around patient's baseline), creatinine 4.3, BUN 74 (this is increased from his baseline creatinine of 2), troponin 0.06, hepatitis-C antibody was also positive.  ED discussed case with Nephrology who recommended admission & IV fluids.  Patient will be admitted to observation for further treatment of his acute on chronic renal failure.    Code status full  Surrogate decisionmaker wife Michelle Cormier      Past Medical History:   Diagnosis Date    Acquired renal cyst of left kidney      Anemia associated with chronic renal failure     CAD (coronary artery disease)     nonobstructive [facility] 9/14    CHF (congestive heart failure)     Chronic immunosuppression with Prograf and MMF 06/18/2015    Chronic venous insufficiency of lower extremity     CKD (chronic kidney disease) stage 3, GFR 30-59 ml/min     Diabetic retinopathy     DM (diabetes mellitus), type 2 with complications 1994    Edema     End stage kidney disease     s/p transplant, doing well    Gallbladder polyp     Heart failure, diastolic, due to HTN     Hemodialysis status     off since transplant    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 RNA NEGATIVE 5/2015, 2021    History of colon polyps     HPTH (hyperparathyroidism)     Hyperlipidemia     Hypertension associated with stage 3 chronic kidney disease due to type 2 diabetes mellitus     LBBB (left bundle branch block) 12/20/2021    Morbid obesity with BMI of 45.0-49.9, adult     PCO (posterior capsular opacification), left 03/04/2019    Proteinuria     resolved s/p transplant    S/P kidney transplant     Sleep apnea     Type 2 diabetes, uncontrolled, with retinopathy     Type II diabetes mellitus with renal manifestations        Past Surgical History:   Procedure Laterality Date    CARDIAC CATHETERIZATION  2008    normal coronary    COLONOSCOPY N/A 4/5/2018    Procedure: COLONOSCOPY;  Surgeon: Chava Ronquillo MD;  Location: [facility] ENDO;  Service: Endoscopy;  Laterality: N/A;    COLONOSCOPY N/A 5/2/2022    Procedure: COLONOSCOPY;  Surgeon: Alix Puente MD;  Location: [facility] ENDO;  Service: Endoscopy;  Laterality: N/A;    KIDNEY TRANSPLANT      RETINAL LASER PROCEDURE         Review of patient's allergies indicates:   Allergen Reactions    Lisinopril Other (See Comments)     Other reaction(s):  cough    Actos  [pioglitazone] Other (See Comments)     Other reaction(s): CHF    Metformin Other (See Comments)     Other  "reaction(s): renal insuff  Other reaction(s): CHF       Current Facility-Administered Medications on File Prior to Encounter   Medication    acetaminophen tablet 650 mg     Current Outpatient Medications on File Prior to Encounter   Medication Sig    apixaban (ELIQUIS) 5 mg Tab Take 1 tablet (5 mg total) by mouth 2 (two) times daily.    aspirin (ECOTRIN) 81 MG EC tablet Take 1 tablet by mouth Daily.     furosemide (LASIX) 80 MG tablet Take one-half tablet (40 mg) by mouth 2 (two) times daily.    tacrolimus (PROGRAF) 1 MG Cap Take 4 capsules (4 mg total) by mouth every 12 (twelve) hours.    BD ULTRA-FINE MINI PEN NEEDLE 31 gauge x 3/16" Ndle Use to inject insulin as needed up to 4 times daily    blood sugar diagnostic Strp Use to test four times per day (Patient taking differently: Use to test four times per day)    calcitRIOL (ROCALTROL) 0.25 MCG Cap Take 1 capsule (0.25 mcg total) by mouth once daily.    famotidine (PEPCID) 20 MG tablet TAKE ONE TABLET BY MOUTH EVERY EVENING    fish oil-omega-3 fatty acids 300-1,000 mg capsule Take 1 g by mouth once daily.    glimepiride (AMARYL) 2 MG tablet Take 1 tablet (2 mg total) by mouth before breakfast.    insulin (LANTUS SOLOSTAR U-100 INSULIN) glargine 100 units/mL SubQ pen Inject 35 Units into the skin 2 (two) times daily.    insulin aspart U-100 (NOVOLOG FLEXPEN U-100 INSULIN) 100 unit/mL (3 mL) InPn pen Inject 25 Units into the skin 3 (three) times daily with meals.    ketoconazole (NIZORAL) 200 mg Tab Take HALF A tablet (100 mg total) by mouth once daily.    lancets 33 gauge Misc 1 Stick by Misc.(Non-Drug; Combo Route) route as directed. Contour lancets. Use to check BG 2-3 times daily.    magnesium oxide (MAG-OX) 400 mg (241.3 mg magnesium) tablet Take 1 tablet (400 mg total) by mouth once daily.    metOLazone (ZAROXOLYN) 2.5 MG tablet Take 1 tablet by mouth on Monday and Friday as directed    metoprolol succinate (TOPROL-XL) 25 MG 24 hr tablet Take " "1 tablet (25 mg total) by mouth once daily.    montelukast (SINGULAIR) 10 mg tablet Take 1 tablet (10 mg total) by mouth every evening.    multivitamin (THERAGRAN) tablet Take 1 tablet by mouth once daily.    mupirocin (BACTROBAN) 2 % ointment Apply topically 3 (three) times daily. Use as directed on the leg wound.    mycophenolate (CELLCEPT) 250 mg Cap Take 3 capsules (750 mg total) by mouth 2 (two) times daily.    ondansetron (ZOFRAN) 4 MG tablet Take 1 tablet (4 mg total) by mouth every 6 (six) hours.    pen needle, diabetic (BD INSULIN PEN NEEDLE UF SHORT) 31 gauge x 5/16" Ndle USE TO INJECT INSULIN TWICE A DAY    potassium chloride SA (K-DUR,KLOR-CON) 20 MEQ tablet Take 2 tablets (40 mEq total) by mouth once daily.    predniSONE (DELTASONE) 5 MG tablet Take 1 tablet (5 mg total) by mouth once daily.    promethazine-dextromethorphan (PROMETHAZINE-DM) 6.25-15 mg/5 mL Syrp Take by mouth.    rosuvastatin (CRESTOR) 40 MG Tab Take 1 tablet (40 mg total) by mouth once daily in the evening.    sacubitriL-valsartan (ENTRESTO)  mg per tablet Take 1 tablet by mouth 2 (two) times daily.    tamsulosin (FLOMAX) 0.4 mg Cap Take 1 capsule (0.4 mg total) by mouth once daily.    triamcinolone acetonide 0.1% (KENALOG) 0.1 % ointment Apply topically 2 (two) times daily. Use on bilateral lower legs.     Family History       Problem Relation (Age of Onset)    Diabetes Mother, Sister, Maternal Grandmother    Heart failure Mother    Hypertension Mother    Kidney disease Sister          Tobacco Use    Smoking status: Former     Types: Cigarettes     Quit date: 2013     Years since quittin.5    Smokeless tobacco: Former     Quit date: 2013    Tobacco comments:     used marijuana since [id number], stopped after started dialysis   Substance and Sexual Activity    Alcohol use: No     Alcohol/week: 0.0 standard drinks    Drug use: No     Comment:      Sexual activity: Never     Review of Systems "   Constitutional:  Positive for appetite change.   Respiratory:  Positive for cough (chronic).    Endocrine:        Low blood sugar   Neurological:         Fall   Objective:     Vital Signs (Most Recent):  Temp: 98.4 °F (36.9 °C) (12/07/22 1939)  Pulse: 92 (12/07/22 2202)  Resp: 19 (12/07/22 2202)  BP: 116/63 (12/07/22 2302)  SpO2: 100 % (12/07/22 1934) Vital Signs (24h Range):  Temp:  [97.5 °F (36.4 °C)-98.9 °F (37.2 °C)] 98.4 °F (36.9 °C)  Pulse:  [] 92  Resp:  [19-23] 19  SpO2:  [98 %-100 %] 100 %  BP: ()/(52-82) 116/63     Weight: 121.6 kg (268 lb 1.3 oz)  Body mass index is 41.99 kg/m².    Physical Exam  Vitals and nursing note reviewed.   HENT:      Head: Normocephalic.   Cardiovascular:      Rate and Rhythm: Normal rate and regular rhythm.      Pulses: Normal pulses.      Heart sounds: Normal heart sounds.   Pulmonary:      Effort: Pulmonary effort is normal.      Breath sounds: Normal breath sounds. No wheezing.   Abdominal:      General: Bowel sounds are normal. There is no distension.      Palpations: Abdomen is soft.      Tenderness: There is no abdominal tenderness.   Musculoskeletal:         General: Swelling (+2 BLE) present.   Skin:     General: Skin is warm and dry.   Neurological:      Mental Status: He is alert and oriented to person, place, and time.           Significant Labs: All pertinent labs within the past 24 hours have been reviewed.    Significant Imaging: I have reviewed all pertinent imaging results/findings within the past 24 hours.    Assessment/Plan:     * Acute on chronic renal failure  -Baseline creatinine per chart review ranges between 2 and 3, today on admission 4.3  -patient with history of renal transplant in 2015, currently on Prograf CellCept and prednisone  -case discussed with Nephrology in the ED and recommended maintenance fluids  -nephrology consulted  -will repeat labs in a.m.   -continue to avoid nephrotoxic agents and renal dose meds as able      Type II  diabetes mellitus with renal manifestations  Patient's FSGs are controlled on current medication regimen.  Last A1c reviewed-   Lab Results   Component Value Date    HGBA1C 7.1 (H) 09/19/2022   Current correctional scale  none  Maintain anti-hyperglycemic dose as follows-   Antihyperglycemics (From admission, onward)    None        Hold Oral hypoglycemics while patient is in the hospital.    -patient with decreased intake and poor appetite, blood glucose 25 at home  -will hold home Lantus and sulfonylurea at this time   -Accu-Cheks q.4 hours  -monitor for signs and symptoms of hypoglycemia    Hypotension due to hypovolemia  -blood pressure 80s/50s in ED, given small amount of IV fluids, with noted improvement in blood pressure  -hypotension likely due to IV VD  -started on maintenance fluids per Nephrology recommendations due to acute on chronic renal  -monitor closely due to history of heart failure      Chronic anticoagulation  -continue home Eliquis      Chronic combined systolic and diastolic congestive heart failure  Patient is identified as having Combined Systolic and Diastolic heart failure that is Chronic. CHF is currently controlled. Latest ECHO performed and demonstrates- Results for orders placed during the hospital encounter of 02/18/22    Echo    Interpretation Summary  · Concentric hypertrophy and mildly decreased systolic function.  · Mild tricuspid regurgitation.  · Mild left atrial enlargement.  · The estimated ejection fraction is 40%.  · Left ventricular diastolic dysfunction.  · There is abnormal septal wall motion consistent with left bundle branch block.  · With normal right ventricular systolic function.  · Normal central venous pressure (3 mmHg).  · The estimated PA systolic pressure is 27 mmHg.  . Continue Beta Blocker and monitor clinical status closely. Monitor on telemetry. Patient is on CHF pathway.  Monitor strict Is&Os and daily weights.  Place on fluid restriction of 1 L. Continue to  stress to patient importance of self efficacy and  on diet for CHF.     -patient followed by Dr. Man as an outpatient  -continue home beta-blocker   -holding ARB and Lasix due to worsening creatinine function        Chronic immunosuppression with Prograf, MMF and prednisone  -renal transplant in 2015   -continue Prograf CellCept and prednisone      Sleep apnea  -continue home CPAP      Anemia associated with chronic renal failure  -hemoglobin baseline around 7-8, currently 8.4   -no signs and symptoms of bleeding   -trend        VTE Risk Mitigation (From admission, onward)         Ordered     apixaban tablet 5 mg  2 times daily         12/07/22 2339     IP VTE HIGH RISK PATIENT  Once         12/07/22 2339     Place sequential compression device  Until discontinued         12/07/22 2339     Reason for No Pharmacological VTE Prophylaxis  Once        Question:  Reasons:  Answer:  Physician Provided (leave comment)  Comment:  continuing home eliquis    12/07/22 2339                   Bozena Laughlin NP  Department of Hospital Medicine   '[place] - Emergency Dept.

## 2022-12-08 NOTE — PLAN OF CARE
Problem: Adult Inpatient Plan of Care  Goal: Plan of Care Review  Outcome: Ongoing, Progressing  Goal: Patient-Specific Goal (Individualized)  Outcome: Ongoing, Progressing  Goal: Absence of Hospital-Acquired Illness or Injury  Outcome: Ongoing, Progressing  Goal: Optimal Comfort and Wellbeing  Outcome: Ongoing, Progressing  Goal: Readiness for Transition of Care  Outcome: Ongoing, Progressing     Problem: Bariatric Environmental Safety  Goal: Safety Maintained with Care  Outcome: Ongoing, Progressing     Problem: Diabetes Comorbidity  Goal: Blood Glucose Level Within Targeted Range  Outcome: Ongoing, Progressing     Problem: Fluid and Electrolyte Imbalance (Acute Kidney Injury/Impairment)  Goal: Fluid and Electrolyte Balance  Outcome: Ongoing, Progressing     Problem: Oral Intake Inadequate (Acute Kidney Injury/Impairment)  Goal: Optimal Nutrition Intake  Outcome: Ongoing, Progressing     Problem: Renal Function Impairment (Acute Kidney Injury/Impairment)  Goal: Effective Renal Function  Outcome: Ongoing, Progressing

## 2022-12-08 NOTE — ED NOTES
Urine not collected. Patient tried but was unsuccessful. Paper printed and next to US desk. Nurse notified.

## 2022-12-08 NOTE — ASSESSMENT & PLAN NOTE
-blood pressure 80s/50s in ED, given small amount of IV fluids, with noted improvement in blood pressure  -hypotension likely due to IV VD  -started on maintenance fluids per Nephrology recommendations due to acute on chronic renal  -monitor closely due to history of heart failure

## 2022-12-08 NOTE — ASSESSMENT & PLAN NOTE
Patient's FSGs are controlled on current medication regimen.  Last A1c reviewed-   Lab Results   Component Value Date    HGBA1C 7.1 (H) 09/19/2022   Current correctional scale  none  Maintain anti-hyperglycemic dose as follows-   Antihyperglycemics (From admission, onward)    None        Hold Oral hypoglycemics while patient is in the hospital.    -patient with decreased intake and poor appetite, blood glucose 25 at home  -will hold home Lantus and sulfonylurea at this time   -Accu-Cheks q.4 hours  -monitor for signs and symptoms of hypoglycemia

## 2022-12-08 NOTE — NURSING
Patient screened for any diabetic needs. Patient states he had an episode of hypoglycemia yesterday. States he was still taking his medications and he hurt his hand and wasn't eating much. Patient states he has been a diabetic since 1984. His wife at bedside. States patient checks blood sugar every couple days or when he feels its high or low. Patient states he has not had a hypoglycemic episode recently. He does need a new meter and lancets. Messaged MD to order. Encouraged patient to call for any additional information.

## 2022-12-08 NOTE — TELEPHONE ENCOUNTER
Refill Routing Note   Medication(s) are not appropriate for processing by [facility] for the following reason(s):      - Patient has been seen in the ED/Hospital since the last PCP visit    [facility] action(s):  Route          Medication reconciliation completed: No     Appointments  past 12m or future 3m with PCP    Date Provider   Last Visit   11/14/2022 Alix Kowalski, DO   Next Visit   Visit date not found Alix Kowalski DO   ED visits in past 90 days: 0        Note composed:10:00 AM 12/08/2022

## 2022-12-08 NOTE — CONSULTS
Mitch Whittaker is a 67 y.o. male for whom nephrology consult has been requested to evaluate and give opinion.     HPI:67 yr/o male with H/O obesity, DM,ESRD,HepC positive was on Hd for 4 years & had LRRT in 2015.Stage 3 CKD baseline cr was 2.5, had diarrhea for past 2 days & had recurrent falls at home was found to be hypoglycemic & elevated cr to 4.2,was on metolazone,eliquis & entresto at home for CHF.Pt started on IVF,feeling better..    PAST MEDICAL HISTORY:  He  has a past medical history of Acquired renal cyst of left kidney, Anemia associated with chronic renal failure, CAD (coronary artery disease), CHF (congestive heart failure), Chronic immunosuppression with Prograf and MMF (06/18/2015), Chronic venous insufficiency of lower extremity, CKD (chronic kidney disease) stage 3, GFR 30-59 ml/min, Diabetic retinopathy, DM (diabetes mellitus), type 2 with complications (1994), Edema, End stage kidney disease, Gallbladder polyp, Heart failure, diastolic, due to HTN, Hemodialysis status, 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, History of colon polyps, HPTH (hyperparathyroidism), Hyperlipidemia, Hypertension associated with stage 3 chronic kidney disease due to type 2 diabetes mellitus, LBBB (left bundle branch block) (12/20/2021), Morbid obesity with BMI of 45.0-49.9, adult, PCO (posterior capsular opacification), left (03/04/2019), Proteinuria, S/P kidney transplant, Sleep apnea, Type 2 diabetes, uncontrolled, with retinopathy, and Type II diabetes mellitus with renal manifestations.    PAST SURGICAL HISTORY:  He  has a past surgical history that includes Retinal laser procedure; Cardiac catheterization (2008); Kidney transplant; Colonoscopy (N/A, 4/5/2018); and Colonoscopy (N/A, 5/2/2022).    SOCIAL HISTORY:  He  reports that he quit smoking about 9 years ago. His smoking use included cigarettes. He quit smokeless tobacco use about 9 years ago. He reports that he does not drink alcohol and does not use  "drugs.    FAMILY MEDICAL HISTORY:  His family history includes Diabetes in his maternal grandmother, mother, and sister; Heart failure in his mother; Hypertension in his mother; Kidney disease in his sister.    Review of patient's allergies indicates:   Allergen Reactions    Lisinopril Other (See Comments)     Other reaction(s):  cough    Actos  [pioglitazone] Other (See Comments)     Other reaction(s): CHF    Metformin Other (See Comments)     Other reaction(s): renal insuff  Other reaction(s): CHF        apixaban  5 mg Oral BID    aspirin  81 mg Oral Daily    calcitRIOL  0.25 mcg Oral Daily    metoprolol succinate  25 mg Oral Daily    mycophenolate  750 mg Oral BID    pravastatin  80 mg Oral QHS    predniSONE  5 mg Oral Daily    senna-docusate 8.6-50 mg  1 tablet Oral BID    tacrolimus  4 mg Oral BID    tamsulosin  1 capsule Oral Daily       Prior to Admission medications    Medication Sig Start Date End Date Taking? Authorizing Provider   apixaban (ELIQUIS) 5 mg Tab Take 1 tablet (5 mg total) by mouth 2 (two) times daily. 11/28/22  Yes Micah Muhammad MD   aspirin (ECOTRIN) 81 MG EC tablet Take 1 tablet by mouth Daily.    Yes Historical Provider   furosemide (LASIX) 80 MG tablet Take one-half tablet (40 mg) by mouth 2 (two) times daily. 8/9/22  Yes Hany Gonsalez MD   tacrolimus (PROGRAF) 1 MG Cap Take 4 capsules (4 mg total) by mouth every 12 (twelve) hours. 3/30/22 3/30/23 Yes Hany Gonsalez MD   BD ULTRA-FINE MINI PEN NEEDLE 31 gauge x 3/16" Ndle Use to inject insulin as needed up to 4 times daily 10/31/22   Alix Kowalski DO   blood sugar diagnostic Strp Use to test four times per day 12/8/22   Eleanor Pizano NP   blood-glucose meter (CONTOUR METER) Misc Use to test four times a day 12/8/22 12/8/23  Eleanor Pizano NP   calcitRIOL (ROCALTROL) 0.25 MCG Cap Take 1 capsule (0.25 mcg total) by mouth once daily. 6/6/22 6/6/23  Marci Pan MD   famotidine (PEPCID) 20 MG tablet TAKE ONE TABLET BY MOUTH " "EVERY EVENING 4/26/19   Cornelio Ham MD   fish oil-omega-3 fatty acids 300-1,000 mg capsule Take 1 g by mouth once daily.    Historical Provider   glimepiride (AMARYL) 2 MG tablet Take 1 tablet (2 mg total) by mouth before breakfast. 10/20/22 10/20/23  Alix Kowalski DO   insulin (LANTUS SOLOSTAR U-100 INSULIN) glargine 100 units/mL SubQ pen Inject 35 Units into the skin 2 (two) times daily. 12/8/22   Michelle Cody PA-C   insulin aspart U-100 (NOVOLOG FLEXPEN U-100 INSULIN) 100 unit/mL (3 mL) InPn pen Inject 25 Units into the skin 3 (three) times daily with meals. 12/6/22 12/6/23  Michelle Cody PA-C   ketoconazole (NIZORAL) 200 mg Tab Take HALF A tablet (100 mg total) by mouth once daily. 4/20/22   Hany Gonsalez MD   lancets Misc Use to check BG 4 (four) times daily before meals and nightly. 12/8/22   Eleanor Pizano, NP   magnesium oxide (MAG-OX) 400 mg (241.3 mg magnesium) tablet Take 1 tablet (400 mg total) by mouth once daily. 7/14/22   Micah Muhammad MD   metOLazone (ZAROXOLYN) 2.5 MG tablet Take 1 tablet by mouth on Monday and Friday as directed 3/17/22 3/17/23  FABRICIO Melara-C   metoprolol succinate (TOPROL-XL) 25 MG 24 hr tablet Take 1 tablet (25 mg total) by mouth once daily. 2/19/22   Cornelio Ham MD   montelukast (SINGULAIR) 10 mg tablet Take 1 tablet (10 mg total) by mouth every evening. 11/30/22   Alix Kowalski DO   multivitamin (THERAGRAN) tablet Take 1 tablet by mouth once daily.    Historical Provider   mupirocin (BACTROBAN) 2 % ointment Apply topically 3 (three) times daily. Use as directed on the leg wound. 10/10/22   Chris Cabral DPM   mycophenolate (CELLCEPT) 250 mg Cap Take 3 capsules (750 mg total) by mouth 2 (two) times daily. 6/13/22 6/13/23  Janelle Lindquist NP   ondansetron (ZOFRAN) 4 MG tablet Take 1 tablet (4 mg total) by mouth every 6 (six) hours. 5/18/22   Dianne Irving Do, MD   pen needle, diabetic (BD INSULIN PEN NEEDLE UF SHORT) 31 gauge x 5/16" " Ndle USE TO INJECT INSULIN TWICE A DAY 8/2/16   Cornelio Ham MD   potassium chloride SA (K-DUR,KLOR-CON) 20 MEQ tablet Take 2 tablets (40 mEq total) by mouth once daily. 10/31/22   Micah Muhammad MD   predniSONE (DELTASONE) 5 MG tablet Take 1 tablet (5 mg total) by mouth once daily. 6/20/22 6/20/23  Hany Gonsalez MD   promethazine-dextromethorphan (PROMETHAZINE-DM) 6.25-15 mg/5 mL Syrp Take by mouth.    Historical Provider   rosuvastatin (CRESTOR) 40 MG Tab Take 1 tablet (40 mg total) by mouth once daily in the evening. 12/31/21   Cornelio Ham MD   sacubitriL-valsartan (ENTRESTO)  mg per tablet Take 1 tablet by mouth 2 (two) times daily. 8/25/22   Micah Muhammad MD   tamsulosin (FLOMAX) 0.4 mg Cap Take 1 capsule (0.4 mg total) by mouth once daily. 2/18/22   Cornelio Ham MD   triamcinolone acetonide 0.1% (KENALOG) 0.1 % ointment Apply topically 2 (two) times daily. Use on bilateral lower legs. 10/10/22   Michelle Zarate MD   blood sugar diagnostic Strp Use to test four times per day  Patient taking differently: Use to test four times per day 10/25/18 12/8/22  Cornelio Ham MD   insulin (LANTUS SOLOSTAR U-100 INSULIN) glargine 100 units/mL SubQ pen Inject 35 Units into the skin 2 (two) times daily. 12/7/21 12/8/22  Cornelio Ham MD   lancets 33 gauge Misc 1 Stick by Misc.(Non-Drug; Combo Route) route as directed. Contour lancets. Use to check BG 2-3 times daily. 9/11/13 12/8/22  Bina Hinkle, ERICAP-C, CDE            Review of Systems   Constitutional:  Positive for malaise/fatigue.   HENT:  Positive for hearing loss.    Eyes: Negative.    Respiratory: Negative.  Negative for shortness of breath.    Cardiovascular: Negative.  Negative for chest pain and leg swelling.   Gastrointestinal:  Positive for diarrhea. Negative for heartburn, nausea and vomiting.   Genitourinary: Negative.    Musculoskeletal: Negative.    Skin: Negative.    Neurological:  Negative for dizziness, tremors and seizures.    Endo/Heme/Allergies: Negative.    Psychiatric/Behavioral: Negative.             Intake/Output Summary (Last 24 hours) at 12/8/2022 1602  Last data filed at 12/8/2022 1133  Gross per 24 hour   Intake 1921.96 ml   Output --   Net 1921.96 ml       PHYSICAL EXAM:  Physical Exam  Constitutional:       Appearance: He is obese.   HENT:      Mouth/Throat:      Mouth: Mucous membranes are moist.   Neck:      Vascular: No JVD.   Cardiovascular:      Heart sounds: S1 normal and S2 normal.   Pulmonary:      Effort: Pulmonary effort is normal.   Abdominal:      General: Bowel sounds are normal.      Tenderness: There is no right CVA tenderness or left CVA tenderness.   Musculoskeletal:      Right lower leg: No edema.      Left lower leg: No edema.   Skin:     General: Skin is warm.   Neurological:      Mental Status: He is alert.   Psychiatric:         Behavior: Behavior normal.            Recent Labs   Lab 12/07/22  1712 12/08/22  0556    140   K 4.1 4.0    102   CO2 24 24   BUN 74* 74*   CREATININE 4.3* 4.2*   CALCIUM 9.4 8.8     Recent Labs   Lab 12/07/22  1705 12/08/22  0556   WBC 6.11 4.37   HGB 8.4* 7.8*   HCT 28.2* 26.4*    196       IMPRESSION AND RECOMMENDATIONS:    ANGELITO/CKD:Bun/cr elevated probably sec to pre renal from  Diuretics & ARB's,volume contracted clinically    S/P LRRT:On prednisone,MMF & Tacrolimus.will  Check Tac levels    Hypotention:BP low on IVF improving    Above plan D/W pt & his wife    Thank you for allowing me to participate in this patient care.

## 2022-12-08 NOTE — HPI
Mr. Whittaker is a 67-year-old male with a history of  HFrEF 40-45%, CKD, DM2, MARION, HTN, HLD, ESRD s/p renal transplant in 2015 who presents to the ED today after a fall at home yesterday.  Patient states he has had decreased intake over the last 2 days, yesterday he checked his blood sugar and it was 25, so he got up to get something to eat and fell to the floor.  He states he did hit his head.  He denied any dizziness or lightheadedness prior to fall.  Patient did not go to the ER that time, he made an appointment with his PCP who saw him earlier today and advised that he report to the ER d/t concern for possible bleed.  Upon arrival patient was noted to have blood pressures in the 80s/50s. CT head was done which showed no acute intracranial processes.  Lab work with a hemoglobin of 8.4 per (seems to be around patient's baseline), creatinine 4.3, BUN 74 (this is increased from his baseline creatinine of 2), troponin 0.06, hepatitis-C antibody was also positive.  ED discussed case with Nephrology who recommended admission & IV fluids.  Patient will be admitted to observation for further treatment of his acute on chronic renal failure.    Code status full  Surrogate decisionmaker wife Michelle Cormier

## 2022-12-08 NOTE — ASSESSMENT & PLAN NOTE
Patient is identified as having Combined Systolic and Diastolic heart failure that is Chronic. CHF is currently controlled. Latest ECHO performed and demonstrates- Results for orders placed during the hospital encounter of 02/18/22    Echo    Interpretation Summary  · Concentric hypertrophy and mildly decreased systolic function.  · Mild tricuspid regurgitation.  · Mild left atrial enlargement.  · The estimated ejection fraction is 40%.  · Left ventricular diastolic dysfunction.  · There is abnormal septal wall motion consistent with left bundle branch block.  · With normal right ventricular systolic function.  · Normal central venous pressure (3 mmHg).  · The estimated PA systolic pressure is 27 mmHg.  . Continue Beta Blocker and monitor clinical status closely. Monitor on telemetry. Patient is on CHF pathway.  Monitor strict Is&Os and daily weights.  Place on fluid restriction of 1 L. Continue to stress to patient importance of self efficacy and  on diet for CHF.     -patient followed by Dr. Man as an outpatient  -continue home beta-blocker   -holding ARB and Lasix due to worsening creatinine function

## 2022-12-09 LAB
ANION GAP SERPL CALC-SCNC: 14 MMOL/L (ref 8–16)
BASOPHILS # BLD AUTO: 0.01 K/UL (ref 0–0.2)
BASOPHILS NFR BLD: 0.3 % (ref 0–1.9)
BUN SERPL-MCNC: 74 MG/DL (ref 8–23)
C DIFF GDH STL QL: NEGATIVE
C DIFF TOX A+B STL QL IA: NEGATIVE
CALCIUM SERPL-MCNC: 8.9 MG/DL (ref 8.7–10.5)
CHLORIDE SERPL-SCNC: 102 MMOL/L (ref 95–110)
CO2 SERPL-SCNC: 23 MMOL/L (ref 23–29)
CREAT SERPL-MCNC: 3.5 MG/DL (ref 0.5–1.4)
CRYPTOSP AG STL QL IA: NEGATIVE
DIFFERENTIAL METHOD: ABNORMAL
E COLI SXT1 STL QL IA: NEGATIVE
E COLI SXT2 STL QL IA: NEGATIVE
EOSINOPHIL # BLD AUTO: 0 K/UL (ref 0–0.5)
EOSINOPHIL NFR BLD: 0.6 % (ref 0–8)
ERYTHROCYTE [DISTWIDTH] IN BLOOD BY AUTOMATED COUNT: 12.8 % (ref 11.5–14.5)
EST. GFR  (NO RACE VARIABLE): 18 ML/MIN/1.73 M^2
ESTIMATED AVG GLUCOSE: 157 MG/DL (ref 68–131)
G LAMBLIA AG STL QL IA: NEGATIVE
GLUCOSE SERPL-MCNC: 199 MG/DL (ref 70–110)
HBA1C MFR BLD: 7.1 % (ref 4–5.6)
HCT VFR BLD AUTO: 27.2 % (ref 40–54)
HCV RNA SERPL QL NAA+PROBE: NOT DETECTED
HCV RNA SPEC NAA+PROBE-ACNC: <12 IU/ML
HGB BLD-MCNC: 8.2 G/DL (ref 14–18)
IMM GRANULOCYTES # BLD AUTO: 0.09 K/UL (ref 0–0.04)
IMM GRANULOCYTES NFR BLD AUTO: 2.5 % (ref 0–0.5)
LYMPHOCYTES # BLD AUTO: 0.7 K/UL (ref 1–4.8)
LYMPHOCYTES NFR BLD: 18.6 % (ref 18–48)
MCH RBC QN AUTO: 25.5 PG (ref 27–31)
MCHC RBC AUTO-ENTMCNC: 30.1 G/DL (ref 32–36)
MCV RBC AUTO: 85 FL (ref 82–98)
MONOCYTES # BLD AUTO: 0.4 K/UL (ref 0.3–1)
MONOCYTES NFR BLD: 10.2 % (ref 4–15)
NEUTROPHILS # BLD AUTO: 2.4 K/UL (ref 1.8–7.7)
NEUTROPHILS NFR BLD: 67.8 % (ref 38–73)
NRBC BLD-RTO: 0 /100 WBC
PLATELET # BLD AUTO: 206 K/UL (ref 150–450)
PMV BLD AUTO: 10.7 FL (ref 9.2–12.9)
POCT GLUCOSE: 183 MG/DL (ref 70–110)
POCT GLUCOSE: 209 MG/DL (ref 70–110)
POCT GLUCOSE: 229 MG/DL (ref 70–110)
POCT GLUCOSE: 275 MG/DL (ref 70–110)
POCT GLUCOSE: 327 MG/DL (ref 70–110)
POCT GLUCOSE: 347 MG/DL (ref 70–110)
POTASSIUM SERPL-SCNC: 3.9 MMOL/L (ref 3.5–5.1)
RBC # BLD AUTO: 3.22 M/UL (ref 4.6–6.2)
RV AG STL QL IA.RAPID: NEGATIVE
SODIUM SERPL-SCNC: 139 MMOL/L (ref 136–145)
WBC # BLD AUTO: 3.54 K/UL (ref 3.9–12.7)
WBC #/AREA STL HPF: NORMAL /[HPF]

## 2022-12-09 PROCEDURE — 80048 BASIC METABOLIC PNL TOTAL CA: CPT | Performed by: NURSE PRACTITIONER

## 2022-12-09 PROCEDURE — 27000207 HC ISOLATION

## 2022-12-09 PROCEDURE — 80197 ASSAY OF TACROLIMUS: CPT | Performed by: INTERNAL MEDICINE

## 2022-12-09 PROCEDURE — 63600175 PHARM REV CODE 636 W HCPCS: Performed by: FAMILY MEDICINE

## 2022-12-09 PROCEDURE — 99233 PR SUBSEQUENT HOSPITAL CARE,LEVL III: ICD-10-PCS | Mod: ,,, | Performed by: INTERNAL MEDICINE

## 2022-12-09 PROCEDURE — 99233 SBSQ HOSP IP/OBS HIGH 50: CPT | Mod: ,,, | Performed by: INTERNAL MEDICINE

## 2022-12-09 PROCEDURE — 36415 COLL VENOUS BLD VENIPUNCTURE: CPT | Performed by: NURSE PRACTITIONER

## 2022-12-09 PROCEDURE — 21400001 HC TELEMETRY ROOM

## 2022-12-09 PROCEDURE — 83036 HEMOGLOBIN GLYCOSYLATED A1C: CPT | Performed by: NURSE PRACTITIONER

## 2022-12-09 PROCEDURE — 25000003 PHARM REV CODE 250: Performed by: FAMILY MEDICINE

## 2022-12-09 PROCEDURE — 25000003 PHARM REV CODE 250: Performed by: NURSE PRACTITIONER

## 2022-12-09 PROCEDURE — 63600175 PHARM REV CODE 636 W HCPCS: Performed by: NURSE PRACTITIONER

## 2022-12-09 PROCEDURE — 85025 COMPLETE CBC W/AUTO DIFF WBC: CPT | Performed by: NURSE PRACTITIONER

## 2022-12-09 RX ADMIN — PRAVASTATIN SODIUM 80 MG: 20 TABLET ORAL at 08:12

## 2022-12-09 RX ADMIN — METOPROLOL SUCCINATE 25 MG: 25 TABLET, EXTENDED RELEASE ORAL at 09:12

## 2022-12-09 RX ADMIN — ASPIRIN 81 MG: 81 TABLET, COATED ORAL at 09:12

## 2022-12-09 RX ADMIN — CALCITRIOL CAPSULES 0.25 MCG 0.25 MCG: 0.25 CAPSULE ORAL at 09:12

## 2022-12-09 RX ADMIN — MYCOPHENOLATE MOFETIL 750 MG: 250 CAPSULE ORAL at 09:12

## 2022-12-09 RX ADMIN — PREDNISONE 5 MG: 5 TABLET ORAL at 09:12

## 2022-12-09 RX ADMIN — MYCOPHENOLATE MOFETIL 750 MG: 250 CAPSULE ORAL at 08:12

## 2022-12-09 RX ADMIN — INSULIN ASPART 2 UNITS: 100 INJECTION, SOLUTION INTRAVENOUS; SUBCUTANEOUS at 08:12

## 2022-12-09 RX ADMIN — APIXABAN 5 MG: 2.5 TABLET, FILM COATED ORAL at 09:12

## 2022-12-09 RX ADMIN — TACROLIMUS 4 MG: 1 CAPSULE ORAL at 05:12

## 2022-12-09 RX ADMIN — INSULIN ASPART 4 UNITS: 100 INJECTION, SOLUTION INTRAVENOUS; SUBCUTANEOUS at 05:12

## 2022-12-09 RX ADMIN — GANCICLOVIR SODIUM 309 MG: 50 INJECTION, POWDER, LYOPHILIZED, FOR SOLUTION INTRAVENTRICULAR at 08:12

## 2022-12-09 RX ADMIN — APIXABAN 5 MG: 2.5 TABLET, FILM COATED ORAL at 08:12

## 2022-12-09 RX ADMIN — TACROLIMUS 4 MG: 1 CAPSULE ORAL at 07:12

## 2022-12-09 RX ADMIN — INSULIN ASPART 2 UNITS: 100 INJECTION, SOLUTION INTRAVENOUS; SUBCUTANEOUS at 11:12

## 2022-12-09 RX ADMIN — TAMSULOSIN HYDROCHLORIDE 0.4 MG: 0.4 CAPSULE ORAL at 09:12

## 2022-12-09 RX ADMIN — INSULIN ASPART 1 UNITS: 100 INJECTION, SOLUTION INTRAVENOUS; SUBCUTANEOUS at 01:12

## 2022-12-09 NOTE — SUBJECTIVE & OBJECTIVE
Interval History: awake, sitting up in bed, NAD, wife at bedside. Patient states he feels better. He was encouraged to exercise and wife states that he gets dizzy when he gets up and stays by himself most of the day, and he is scared to walk too far. He only goes to kitchen and back to get food. Has been having diarrhea, will order stool samples and CMV.    Review of Systems   Constitutional:  Negative for chills, fatigue and fever.   Respiratory:  Negative for cough, shortness of breath and wheezing.    Gastrointestinal:  Positive for diarrhea. Negative for abdominal pain, constipation and nausea.   Neurological:  Positive for weakness. Negative for dizziness.   Objective:     Vital Signs (Most Recent):  Temp: 98.2 °F (36.8 °C) (12/08/22 1519)  Pulse: 87 (12/08/22 1710)  Resp: 18 (12/08/22 1638)  BP: 138/66 (12/08/22 1519)  SpO2: 96 % (12/08/22 1519) Vital Signs (24h Range):  Temp:  [97.9 °F (36.6 °C)-98.6 °F (37 °C)] 98.2 °F (36.8 °C)  Pulse:  [] 87  Resp:  [17-24] 18  SpO2:  [94 %-100 %] 96 %  BP: (106-138)/(57-66) 138/66     Weight: 125 kg (275 lb 9.2 oz)  Body mass index is 43.16 kg/m².    Intake/Output Summary (Last 24 hours) at 12/8/2022 1835  Last data filed at 12/8/2022 1133  Gross per 24 hour   Intake 1921.96 ml   Output --   Net 1921.96 ml      Physical Exam  Vitals and nursing note reviewed.   Constitutional:       Appearance: He is obese.   Cardiovascular:      Rate and Rhythm: Normal rate and regular rhythm.      Heart sounds: No murmur heard.  Pulmonary:      Effort: Pulmonary effort is normal.      Breath sounds: Normal breath sounds.   Abdominal:      General: Bowel sounds are normal.      Palpations: Abdomen is soft.   Musculoskeletal:         General: Normal range of motion.   Skin:     General: Skin is warm and dry.   Neurological:      General: No focal deficit present.      Mental Status: He is alert and oriented to person, place, and time.   Psychiatric:         Mood and Affect: Mood  normal.         Behavior: Behavior normal.       Significant Labs: All pertinent labs within the past 24 hours have been reviewed.  CBC:   Recent Labs   Lab 12/07/22  1705 12/08/22  0556   WBC 6.11 4.37   HGB 8.4* 7.8*   HCT 28.2* 26.4*    196     CMP:   Recent Labs   Lab 12/07/22  1712 12/08/22  0556    140   K 4.1 4.0    102   CO2 24 24   * 165*   BUN 74* 74*   CREATININE 4.3* 4.2*   CALCIUM 9.4 8.8   PROT 6.4  --    ALBUMIN 2.7*  --    BILITOT 0.3  --    ALKPHOS 56  --    AST 15  --    ALT 9*  --    ANIONGAP 16 14       Significant Imaging: I have reviewed all pertinent imaging results/findings within the past 24 hours.

## 2022-12-09 NOTE — PROGRESS NOTES
[facility]  Nephrology  Progress Note    Patient Name: Mitch Whittaker  MRN: [id number]  Admission Date: 12/7/2022  Hospital Length of Stay: 1 days  Attending Provider: Lindsey Ferreira MD   Primary Care Physician: Alix Kowalski DO  Principal Problem:Acute on chronic renal failure    Subjective:     HPI: No notes on file    Interval History: Pt was seen and examined. Labs and meds reviewed. Discussed with other providers. Pt is a 66 y/o male with h/o of CKD stage 3, living related kidney transplant form his daughter in 2015, HTN, DM, obesity, and combined systolic and diastolic CHF with EF 40% who presented with 3 days of diarrhea. Pt was hypotensive and hypoglycemic on admit. Progress in the past 3 days reviewed. S Cr had worsened to 4.3. Pt received gentle amounts of IVF's. He now says that diarrhea has resolved. Last episode yesterday afternoon, no abd pain, no fever, no SOB, leg swelling not worse.    Review of patient's allergies indicates:   Allergen Reactions    Lisinopril Other (See Comments)     Other reaction(s):  cough    Actos  [pioglitazone] Other (See Comments)     Other reaction(s): CHF    Metformin Other (See Comments)     Other reaction(s): renal insuff  Other reaction(s): CHF     Current Facility-Administered Medications   Medication Frequency    acetaminophen tablet 650 mg Q4H PRN    albuterol-ipratropium 2.5 mg-0.5 mg/3 mL nebulizer solution 3 mL Q6H PRN    apixaban tablet 5 mg BID    aspirin EC tablet 81 mg Daily    calcitRIOL capsule 0.25 mcg Daily    dextrose 10% bolus 125 mL PRN    dextrose 10% bolus 125 mL PRN    dextrose 10% bolus 250 mL PRN    dextrose 10% bolus 250 mL PRN    glucagon (human recombinant) injection 1 mg PRN    glucagon (human recombinant) injection 1 mg PRN    glucose chewable tablet 16 g PRN    glucose chewable tablet 16 g PRN    glucose chewable tablet 24 g PRN    glucose chewable tablet 24 g PRN    HYDROcodone-acetaminophen 5-325 mg per tablet 1  tablet Q6H PRN    insulin aspart U-100 pen 0-5 Units QID (AC + HS) PRN    melatonin tablet 6 mg Nightly PRN    metoprolol succinate (TOPROL-XL) 24 hr tablet 25 mg Daily    mycophenolate capsule 750 mg BID    naloxone 0.4 mg/mL injection 0.02 mg PRN    ondansetron injection 4 mg Q8H PRN    pravastatin tablet 80 mg QHS    predniSONE tablet 5 mg Daily    senna-docusate 8.6-50 mg per tablet 1 tablet BID    sodium chloride 0.9% flush 10 mL Q12H PRN    tacrolimus capsule 4 mg BID    tamsulosin 24 hr capsule 0.4 mg Daily       Objective:     Vital Signs (Most Recent):  Temp: 98.1 °F (36.7 °C) (12/09/22 0733)  Pulse: 89 (12/09/22 0733)  Resp: 18 (12/09/22 0733)  BP: 117/60 (12/09/22 0733)  SpO2: 98 % (12/09/22 0733)  O2 Device (Oxygen Therapy): room air (12/08/22 0741) Vital Signs (24h Range):  Temp:  [97.9 °F (36.6 °C)-98.6 °F (37 °C)] 98.1 °F (36.7 °C)  Pulse:  [83-98] 89  Resp:  [18-24] 18  SpO2:  [96 %-98 %] 98 %  BP: (117-145)/(60-70) 117/60     Weight: 123.7 kg (272 lb 11.3 oz) (12/09/22 0049)  Body mass index is 42.71 kg/m².  Body surface area is 2.42 meters squared.    I/O last 3 completed shifts:  In: 1922 [I.V.:923; IV Piggyback:999]  Out: -     Physical Exam  Vitals and nursing note reviewed.   Constitutional:       Appearance: Normal appearance.   HENT:      Head: Normocephalic and atraumatic.   Cardiovascular:      Rate and Rhythm: Normal rate and regular rhythm.      Pulses: Normal pulses.      Heart sounds: Normal heart sounds.   Pulmonary:      Effort: Pulmonary effort is normal. No respiratory distress.      Breath sounds: Normal breath sounds. No rales.   Abdominal:      Palpations: Abdomen is soft.      Tenderness: There is no abdominal tenderness. There is no guarding.   Musculoskeletal:      Right lower leg: Edema present.      Left lower leg: Edema present.   Skin:     General: Skin is warm and dry.   Neurological:      Mental Status: He is alert and oriented to person, place, and time.    Psychiatric:         Behavior: Behavior normal.       Significant Labs: reviewed  BMP  Lab Results   Component Value Date     12/09/2022    K 3.9 12/09/2022     12/09/2022    CO2 23 12/09/2022    BUN 74 (H) 12/09/2022    CREATININE 3.5 (H) 12/09/2022    CALCIUM 8.9 12/09/2022    ANIONGAP 14 12/09/2022    EGFRNORACEVR 18 (A) 12/09/2022     Lab Results   Component Value Date    WBC 3.54 (L) 12/09/2022    HGB 8.2 (L) 12/09/2022    HCT 27.2 (L) 12/09/2022    MCV 85 12/09/2022     12/09/2022       Prograf level pending  Significant Imaging: reviewed CXR    Assessment/Plan:     66 y/o male with h/o of kidney transplant and CKD stage 3 presented with hypotension and diarrhea:    Kidney transplanted  ANGELITO on CKD stage 3.  ANGELITO due to hypotension and diarrhea. Stable, ANGELITO resolving, Cr lower now  Cr tended to fluctuate in the past (pt known to me from clinic), likely due to diuretics and CHF.      H/o of DM and HTN  Was on HD x 2.5 years before transplant      2. Immunosuppression  H/o of living related kidney transplant in 2015 form daughter  Prograf level pending  No change in prograf dose for now until level is back  Meds and doses reviewed    Hypotension due to hypovolemia  Due to diarrhea x 3 days  Improved, clinically stable  Advised pt to continue to eat and drink to replace losses,, but not to over-do drinking as CHF might worsen.    Chronic combined systolic and diastolic congestive heart failure  Hemodynamically stable at this point  Salt/fluids, diet reviewed with pt    Diarrhea  Unsure what causes it, appears acute  Has resolved now  W/u and stool studies ordered and pending reviewed      Plans and recommendations:  As detailed above  Total time spent 40 minutes including time needed to review the records, the   patient evaluation, documentation, face-to-face discussion with the patient,   more than 50% of the time was spent on coordination of care and counseling.        Hany Gonsalez,  MD  Nephrology  [facility]

## 2022-12-09 NOTE — HOSPITAL COURSE
66 y/o male admitted 12/7 after a fall at home the day PTA and hit his head. He has had decreased oral intake of the past few days, his blood sugar was 53 when he checked it. He reports decreased intake that day, but was getting up to get something to eat and passed out. He was unable to get in with his PCP, so he went to another physician in his group, who advised him to go to the ER d/t concerns for head bleed after hitting his head. His blood pressure was in 80s/50s on arrival.   CT negative for acute process. Hep C antibody positive. IVF hydration ordered. Patient is a kidney transplant patient in 2015. Nephrology consulted for evaluation.  Last Hgb A1c 9/2022 was 7.1, will repeat this hospitalization.   Ordered a new glucometer and strips for patient, his is malfunctioning, he will  from [facility] on Monday.   Patient has also been having diarrhea x 3 days PTA, stool samples ordered, C-diff negative. CMV , started on IV ganciclovir BID.   Overnight on 12/9, he had 11 beats of VT on telemetry strip, patient asymptomatic, reports he was getting up to get in chair but did not feel light head or palpitations, will continue to monitor.  Creatinine trending down, 3.1 on the day of discharge.     Nephrology, Dr. Gonsalez spoke with patients transplant specialist, Dr. Pan at [facility] and cleared patient for discharge with q week x 2 CMV DNA quant by PCR titer, with home health. He was given a prescription for Valcyte to take daily PO x 21 days.     He is to follow up with Dr. Gonsalez in 1-2 weeks for hospital follow up.     Patient seen and examined prior to discharge.   All questions and concerns were addressed prior to discharge.

## 2022-12-09 NOTE — ASSESSMENT & PLAN NOTE
Due to diarrhea x 3 days  Improved, clinically stable  Advised pt to continue to eat and drink to replace losses,, but not to over-do drinking as CHF might worsen.

## 2022-12-09 NOTE — ASSESSMENT & PLAN NOTE
-acute on chronic  -h/o kidney transplant 2015  -Cr 4.2 today  -BMP reviewed- noted Estimated Creatinine Clearance: 21.1 mL/min (A) (based on SCr of 4.2 mg/dL (H)). according to latest data  -Monitor UOP and serial BMP and adjust therapy as needed  -Renally dose meds and avoid nephrotoxins  -monitor labs

## 2022-12-09 NOTE — PROGRESS NOTES
[facility] Medicine  Progress Note    Patient Name: Mitch Whittaker  MRN: [id number]  Patient Class: IP- Inpatient   Admission Date: 12/7/2022  Length of Stay: 0 days  Attending Physician: Lindsey Ferreira MD  Primary Care Provider: Alix Kowalski DO        Subjective:     Principal Problem:Acute on chronic renal failure      HPI:  Mr. Whittaker is a 67-year-old male with a history of  HFrEF 40-45%, CKD, DM2, MARION, HTN, HLD, ESRD s/p renal transplant in 2015 who presents to the ED today after a fall at home yesterday.  Patient states he has had decreased intake over the last 2 days, yesterday he checked his blood sugar and it was 25, so he got up to get something to eat and fell to the floor.  He states he did hit his head.  He denied any dizziness or lightheadedness prior to fall.  Patient did not go to the ER that time, he made an appointment with his PCP who saw him earlier today and advised that he report to the ER d/t concern for possible bleed.  Upon arrival patient was noted to have blood pressures in the 80s/50s. CT head was done which showed no acute intracranial processes.  Lab work with a hemoglobin of 8.4 per (seems to be around patient's baseline), creatinine 4.3, BUN 74 (this is increased from his baseline creatinine of 2), troponin 0.06, hepatitis-C antibody was also positive.  ED discussed case with Nephrology who recommended admission & IV fluids.  Patient will be admitted to observation for further treatment of his acute on chronic renal failure.    Code status full  Surrogate decisionmaker wife Michelle Cormier      Overview/Hospital Course:  66 y/o male admitted 12/7 after a fall at home the day PTA and hit his head. He has had decreased oral intake of the past few days, his blood sugar was 53 when he checked it. He reports decreased intake that day, but was getting up to get something to eat and passed out. He was unable to get in with his PCP, so he went to another physician in his group, who  advised him to go to the ER d/t concerns for head bleed after hitting his head. His blood pressure was in 80s/50s on arrival.   CT negative for acute process. Hep C antibody positive. Creatinine 4.3, down to 4.2 today. IVF hydration ordered. Patient is a kidney transplant patient in 2015. Nephrology consulted for evaluation.  Last Hgb A1c 9/2022 was 7.1, will repeat this hospitalization.   Ordered a new glucometer and strips for patient, his is malfunctioning.  Patient has also been having diarrhea x 3 days PTA, will order stool samples and CMV.       Interval History: awake, sitting up in bed, NAD, wife at bedside. Patient states he feels better. He was encouraged to exercise and wife states that he gets dizzy when he gets up and stays by himself most of the day, and he is scared to walk too far. He only goes to kitchen and back to get food. Has been having diarrhea, will order stool samples and CMV.    Review of Systems   Constitutional:  Negative for chills, fatigue and fever.   Respiratory:  Negative for cough, shortness of breath and wheezing.    Gastrointestinal:  Positive for diarrhea. Negative for abdominal pain, constipation and nausea.   Neurological:  Positive for weakness. Negative for dizziness.   Objective:     Vital Signs (Most Recent):  Temp: 98.2 °F (36.8 °C) (12/08/22 1519)  Pulse: 87 (12/08/22 1710)  Resp: 18 (12/08/22 1638)  BP: 138/66 (12/08/22 1519)  SpO2: 96 % (12/08/22 1519) Vital Signs (24h Range):  Temp:  [97.9 °F (36.6 °C)-98.6 °F (37 °C)] 98.2 °F (36.8 °C)  Pulse:  [] 87  Resp:  [17-24] 18  SpO2:  [94 %-100 %] 96 %  BP: (106-138)/(57-66) 138/66     Weight: 125 kg (275 lb 9.2 oz)  Body mass index is 43.16 kg/m².    Intake/Output Summary (Last 24 hours) at 12/8/2022 1835  Last data filed at 12/8/2022 1133  Gross per 24 hour   Intake 1921.96 ml   Output --   Net 1921.96 ml      Physical Exam  Vitals and nursing note reviewed.   Constitutional:       Appearance: He is obese.    Cardiovascular:      Rate and Rhythm: Normal rate and regular rhythm.      Heart sounds: No murmur heard.  Pulmonary:      Effort: Pulmonary effort is normal.      Breath sounds: Normal breath sounds.   Abdominal:      General: Bowel sounds are normal.      Palpations: Abdomen is soft.   Musculoskeletal:         General: Normal range of motion.   Skin:     General: Skin is warm and dry.   Neurological:      General: No focal deficit present.      Mental Status: He is alert and oriented to person, place, and time.   Psychiatric:         Mood and Affect: Mood normal.         Behavior: Behavior normal.       Significant Labs: All pertinent labs within the past 24 hours have been reviewed.  CBC:   Recent Labs   Lab 12/07/22  1705 12/08/22  0556   WBC 6.11 4.37   HGB 8.4* 7.8*   HCT 28.2* 26.4*    196     CMP:   Recent Labs   Lab 12/07/22  1712 12/08/22  0556    140   K 4.1 4.0    102   CO2 24 24   * 165*   BUN 74* 74*   CREATININE 4.3* 4.2*   CALCIUM 9.4 8.8   PROT 6.4  --    ALBUMIN 2.7*  --    BILITOT 0.3  --    ALKPHOS 56  --    AST 15  --    ALT 9*  --    ANIONGAP 16 14       Significant Imaging: I have reviewed all pertinent imaging results/findings within the past 24 hours.      Assessment/Plan:      * Acute on chronic renal failure  -acute on chronic  -h/o kidney transplant 2015  -Cr 4.2 today  -BMP reviewed- noted Estimated Creatinine Clearance: 21.1 mL/min (A) (based on SCr of 4.2 mg/dL (H)). according to latest data  -Monitor UOP and serial BMP and adjust therapy as needed  -Renally dose meds and avoid nephrotoxins  -monitor labs    Syncope and collapse  -patient got dizzy and weak causing him to become lightheaded 1 day PTA, he fell and hit his head d/t hypoglycemia  -fall precautions  -gentle hydration  -cont to monitor    Hypotension due to hypovolemia  -BP improved  -cont to monitor VS routinely  -cont gentle hydration, monitor for fluid overload with CHF  -hypotension likely  due to IVVD from diarrhea, dehydration    Diarrhea  Patient reports watery diarrhea x 3 days PTA  -likely contributing to his syncopal episode, dehydration, hypotension, and hypoglycemia  -order stool samples include CMV since immunocompromised  -precautionary isolation precautions  -cont IVF hydration    Dehydration  -cont IVF hydration, monitor for overload      Kidney transplanted  -see above    Anemia associated with chronic renal failure  -hemoglobin baseline around 7-8, currently 7.8  -no signs and symptoms of bleeding   -trend H&H  -transfuse PRBCs as needed if Hgb < 7 or symptomatic  -monitor labs    Chronic immunosuppression with Prograf, MMF and prednisone  -renal transplant in 2015   -continue Prograf CellCept and prednisone    Type II diabetes mellitus with renal manifestations  Patient's FSGs are controlled on current medication regimen.  Last A1c reviewed-   Lab Results   Component Value Date    HGBA1C 7.1 (H) 09/19/2022   Current correctional scale  none  Maintain anti-hyperglycemic dose as follows-   Antihyperglycemics (From admission, onward)    Start     Stop Route Frequency Ordered    12/08/22 1928  insulin aspart U-100 pen 0-5 Units         -- SubQ Before meals & nightly PRN 12/08/22 1829        Hold Oral hypoglycemics while patient is in the hospital.    -patient with decreased intake and poor appetite, blood glucose 53 at home  -will hold home Lantus and sulfonylurea at this time   -accuchecks AC/HS  -monitor for signs and symptoms of hypoglycemia  -repeat Hgb A1C     Chronic combined systolic and diastolic congestive heart failure  Patient is identified as having Combined Systolic and Diastolic heart failure that is Chronic. CHF is currently controlled. Latest ECHO performed and demonstrates- Results for orders placed during the hospital encounter of 02/18/22    Echo    Interpretation Summary  · Concentric hypertrophy and mildly decreased systolic function.  · Mild tricuspid regurgitation.  ·  Mild left atrial enlargement.  · The estimated ejection fraction is 40%.  · Left ventricular diastolic dysfunction.  · There is abnormal septal wall motion consistent with left bundle branch block.  · With normal right ventricular systolic function.  · Normal central venous pressure (3 mmHg).  · The estimated PA systolic pressure is 27 mmHg.  . Continue Beta Blocker and monitor clinical status closely. Monitor on telemetry. Patient is on CHF pathway.  Monitor strict Is&Os and daily weights.  Place on fluid restriction of 1 L. Continue to stress to patient importance of self efficacy and  on diet for CHF.     -patient followed by Dr. Man as an outpatient  -continue home beta-blocker   -holding ARB and Lasix due to worsening creatinine function    Sleep apnea  -continue home CPAP    Chronic anticoagulation  -continue home Eliquis    Severe obesity (BMI >= 40)  Body mass index is 43.16 kg/m². Morbid obesity complicates all aspects of disease management from diagnostic modalities to treatment. Weight loss encouraged and health benefits explained to patient.     VTE Risk Mitigation (From admission, onward)         Ordered     apixaban tablet 5 mg  2 times daily         12/07/22 2339     IP VTE HIGH RISK PATIENT  Once         12/07/22 2339     Place sequential compression device  Until discontinued         12/07/22 2339     Reason for No Pharmacological VTE Prophylaxis  Once        Question:  Reasons:  Answer:  Physician Provided (leave comment)  Comment:  continuing home eliquis    12/07/22 2339                Discharge Planning   GRETEL:      Code Status: Full Code   Is the patient medically ready for discharge?:     Reason for patient still in hospital (select all that apply): Patient trending condition and Laboratory test             Eleanor Pizano NP  Department of Hospital Medicine   [facility] - Med Surg

## 2022-12-09 NOTE — ASSESSMENT & PLAN NOTE
Patient reports watery diarrhea x 3 days PTA  -likely contributing to his syncopal episode, dehydration, hypotension, and hypoglycemia  -order stool samples include CMV since immunocompromised  -precautionary isolation precautions  -cont IVF hydration

## 2022-12-09 NOTE — ASSESSMENT & PLAN NOTE
-BP improved  -cont to monitor VS routinely  -cont gentle hydration, monitor for fluid overload with CHF  -hypotension likely due to IVVD from diarrhea, dehydration

## 2022-12-09 NOTE — SUBJECTIVE & OBJECTIVE
Interval History: Pt was seen and examined. Labs and meds reviewed. Discussed with other providers. Pt is a 66 y/o male with h/o of CKD stage 3, living related kidney transplant form his daughter in 2015, HTN, DM, obesity, and combined systolic and diastolic CHF with EF 40% who presented with 3 days of diarrhea. Pt was hypotensive and hypoglycemic on admit. Progress in the past 3 days reviewed. S Cr had worsened to 4.3. Pt received gentle amounts of IVF's. He now says that diarrhea has resolved. Last episode yesterday afternoon, no abd pain, no fever, no SOB, leg swelling not worse.    Review of patient's allergies indicates:   Allergen Reactions    Lisinopril Other (See Comments)     Other reaction(s):  cough    Actos  [pioglitazone] Other (See Comments)     Other reaction(s): CHF    Metformin Other (See Comments)     Other reaction(s): renal insuff  Other reaction(s): CHF     Current Facility-Administered Medications   Medication Frequency    acetaminophen tablet 650 mg Q4H PRN    albuterol-ipratropium 2.5 mg-0.5 mg/3 mL nebulizer solution 3 mL Q6H PRN    apixaban tablet 5 mg BID    aspirin EC tablet 81 mg Daily    calcitRIOL capsule 0.25 mcg Daily    dextrose 10% bolus 125 mL PRN    dextrose 10% bolus 125 mL PRN    dextrose 10% bolus 250 mL PRN    dextrose 10% bolus 250 mL PRN    glucagon (human recombinant) injection 1 mg PRN    glucagon (human recombinant) injection 1 mg PRN    glucose chewable tablet 16 g PRN    glucose chewable tablet 16 g PRN    glucose chewable tablet 24 g PRN    glucose chewable tablet 24 g PRN    HYDROcodone-acetaminophen 5-325 mg per tablet 1 tablet Q6H PRN    insulin aspart U-100 pen 0-5 Units QID (AC + HS) PRN    melatonin tablet 6 mg Nightly PRN    metoprolol succinate (TOPROL-XL) 24 hr tablet 25 mg Daily    mycophenolate capsule 750 mg BID    naloxone 0.4 mg/mL injection 0.02 mg PRN    ondansetron injection 4 mg Q8H PRN    pravastatin tablet 80 mg QHS    predniSONE tablet 5 mg Daily     senna-docusate 8.6-50 mg per tablet 1 tablet BID    sodium chloride 0.9% flush 10 mL Q12H PRN    tacrolimus capsule 4 mg BID    tamsulosin 24 hr capsule 0.4 mg Daily       Objective:     Vital Signs (Most Recent):  Temp: 98.1 °F (36.7 °C) (12/09/22 0733)  Pulse: 89 (12/09/22 0733)  Resp: 18 (12/09/22 0733)  BP: 117/60 (12/09/22 0733)  SpO2: 98 % (12/09/22 0733)  O2 Device (Oxygen Therapy): room air (12/08/22 0741) Vital Signs (24h Range):  Temp:  [97.9 °F (36.6 °C)-98.6 °F (37 °C)] 98.1 °F (36.7 °C)  Pulse:  [83-98] 89  Resp:  [18-24] 18  SpO2:  [96 %-98 %] 98 %  BP: (117-145)/(60-70) 117/60     Weight: 123.7 kg (272 lb 11.3 oz) (12/09/22 0049)  Body mass index is 42.71 kg/m².  Body surface area is 2.42 meters squared.    I/O last 3 completed shifts:  In: 1922 [I.V.:923; IV Piggyback:999]  Out: -     Physical Exam  Vitals and nursing note reviewed.   Constitutional:       Appearance: Normal appearance.   HENT:      Head: Normocephalic and atraumatic.   Cardiovascular:      Rate and Rhythm: Normal rate and regular rhythm.      Pulses: Normal pulses.      Heart sounds: Normal heart sounds.   Pulmonary:      Effort: Pulmonary effort is normal. No respiratory distress.      Breath sounds: Normal breath sounds. No rales.   Abdominal:      Palpations: Abdomen is soft.      Tenderness: There is no abdominal tenderness. There is no guarding.   Musculoskeletal:      Right lower leg: Edema present.      Left lower leg: Edema present.   Skin:     General: Skin is warm and dry.   Neurological:      Mental Status: He is alert and oriented to person, place, and time.   Psychiatric:         Behavior: Behavior normal.       Significant Labs: reviewed  BMP  Lab Results   Component Value Date     12/09/2022    K 3.9 12/09/2022     12/09/2022    CO2 23 12/09/2022    BUN 74 (H) 12/09/2022    CREATININE 3.5 (H) 12/09/2022    CALCIUM 8.9 12/09/2022    ANIONGAP 14 12/09/2022    EGFRNORACEVR 18 (A) 12/09/2022     Lab Results    Component Value Date    WBC 3.54 (L) 12/09/2022    HGB 8.2 (L) 12/09/2022    HCT 27.2 (L) 12/09/2022    MCV 85 12/09/2022     12/09/2022       Prograf level pending  Significant Imaging: reviewed CXR

## 2022-12-09 NOTE — ASSESSMENT & PLAN NOTE
-BP improved  -cont to monitor VS routinely  -IVF stopped today  -hypotension likely due to IVVD from diarrhea, dehydration

## 2022-12-09 NOTE — ASSESSMENT & PLAN NOTE
-patient got dizzy and weak causing him to become lightheaded 1 day PTA, he fell and hit his head d/t hypoglycemia  -improved today  -fall precautions  -gentle hydration  -cont to monitor

## 2022-12-09 NOTE — ASSESSMENT & PLAN NOTE
Patient reports watery diarrhea x 3 days PTA  -likely contributing to his syncopal episode, dehydration, hypotension, and hypoglycemia  -stool samples + CMV pending  -resolved   -precautionary isolation precautions

## 2022-12-09 NOTE — ASSESSMENT & PLAN NOTE
ANGELITO on CKD stage 3.  ANGELITO due to hypotension and diarrhea. Stable, ANGELITO resolving, Cr lower now  Cr tended to fluctuate in the past (pt known to me from clinic), likely due to diuretics and CHF.      H/o of DM and HTN  Was on HD x 2.5 years before transplant      2. Immunosuppression  H/o of living related kidney transplant in 2015 form daughter  Prograf level pending  No change in prograf dose for now until level is back  Meds and doses reviewed

## 2022-12-09 NOTE — SUBJECTIVE & OBJECTIVE
Interval History: awake, alert, sitting up in chair, NAD, wife at bedside. Patient reports feeling a lot better today, Cr trending down 3.5 today (baseline btwn 2-3). Denies diarrhea since yesterday am, stool formed now. Stool studies still pending. Worked with therapy today, denies being lightheaded. Likely d/t tomorrow if Cr continues to trend down, and stool studies resulted.    Review of Systems   Constitutional:  Negative for chills, fatigue and fever.   Respiratory:  Negative for cough, shortness of breath and wheezing.    Gastrointestinal:  Negative for abdominal pain, constipation, diarrhea and nausea.   Neurological:  Positive for weakness. Negative for dizziness.   Objective:     Vital Signs (Most Recent):  Temp: 97.9 °F (36.6 °C) (22 1204)  Pulse: 90 (22 1320)  Resp: 18 (22 1204)  BP: (!) 150/77 (22 1204)  SpO2: 96 % (22 1204)   Vital Signs (24h Range):  Temp:  [97.9 °F (36.6 °C)-98.2 °F (36.8 °C)] 97.9 °F (36.6 °C)  Pulse:  [] 90  Resp:  [18-24] 18  SpO2:  [96 %-98 %] 96 %  BP: (117-150)/(60-77) 150/77     Weight: 123.7 kg (272 lb 11.3 oz)  Body mass index is 42.71 kg/m².  No intake or output data in the 24 hours ending 22 1420   Physical Exam  Vitals and nursing note reviewed.   Constitutional:       Appearance: He is obese.   Cardiovascular:      Rate and Rhythm: Normal rate and regular rhythm.      Heart sounds: No murmur heard.  Pulmonary:      Effort: Pulmonary effort is normal.      Breath sounds: Normal breath sounds.   Abdominal:      General: Bowel sounds are normal.      Palpations: Abdomen is soft.   Musculoskeletal:         General: Normal range of motion.      Right lower le+ Pitting Edema present.      Left lower le+ Pitting Edema present.   Skin:     General: Skin is warm and dry.   Neurological:      General: No focal deficit present.      Mental Status: He is alert and oriented to person, place, and time.   Psychiatric:         Mood and  Affect: Mood normal.         Behavior: Behavior normal.       Significant Labs: All pertinent labs within the past 24 hours have been reviewed.  BMP:   Recent Labs   Lab 12/09/22  0614   *      K 3.9      CO2 23   BUN 74*   CREATININE 3.5*   CALCIUM 8.9     CBC:   Recent Labs   Lab 12/07/22  1705 12/08/22  0556 12/09/22  0615   WBC 6.11 4.37 3.54*   HGB 8.4* 7.8* 8.2*   HCT 28.2* 26.4* 27.2*    196 206       Significant Imaging: I have reviewed all pertinent imaging results/findings within the past 24 hours.

## 2022-12-09 NOTE — ASSESSMENT & PLAN NOTE
Patient's FSGs are controlled on current medication regimen.  Last A1c reviewed-   Lab Results   Component Value Date    HGBA1C 7.1 (H) 09/19/2022   Current correctional scale  none  Maintain anti-hyperglycemic dose as follows-   Antihyperglycemics (From admission, onward)    Start     Stop Route Frequency Ordered    12/08/22 1928  insulin aspart U-100 pen 0-5 Units         -- SubQ Before meals & nightly PRN 12/08/22 1829        Hold Oral hypoglycemics while patient is in the hospital.    -patient with decreased intake and poor appetite, blood glucose 53 at home  -will hold home Lantus and sulfonylurea at this time   -accuchecks AC/HS  -monitor for signs and symptoms of hypoglycemia  -repeat Hgb A1C

## 2022-12-09 NOTE — PLAN OF CARE
Pt remains free from falls/injuries this shift. Safety precautions maintained. Pain managed with pain medication. No s/s of acute distress noted. VSS. Tele monitoring per orders. Continuing plan of care. Chart check complete, all orders reviewed.

## 2022-12-09 NOTE — ASSESSMENT & PLAN NOTE
Patient's FSGs are controlled on current medication regimen.  Last A1c reviewed-   Lab Results   Component Value Date    HGBA1C 7.1 (H) 09/19/2022   Current correctional scale  none  Maintain anti-hyperglycemic dose as follows-   Antihyperglycemics (From admission, onward)    Start     Stop Route Frequency Ordered    12/08/22 1928  insulin aspart U-100 pen 0-5 Units         -- SubQ Before meals & nightly PRN 12/08/22 1829        Hold Oral hypoglycemics while patient is in the hospital.    -patient with decreased intake and poor appetite, blood glucose 53 at home  -will hold home Lantus and sulfonylurea at this time   -accuchecks AC/HS  -monitor for signs and symptoms of hypoglycemia  -repeat Hgb A1C pending

## 2022-12-09 NOTE — PROGRESS NOTES
[facility] Medicine  Progress Note    Patient Name: Mitch Whittaker  MRN: [id number]  Patient Class: IP- Inpatient   Admission Date: 12/7/2022  Length of Stay: 1 days  Attending Physician: Lindsey Ferreira MD  Primary Care Provider: Alix Kowalski DO        Subjective:     Principal Problem:Acute on chronic renal failure      HPI:  Mr. Whittaker is a 67-year-old male with a history of  HFrEF 40-45%, CKD, DM2, MARION, HTN, HLD, ESRD s/p renal transplant in 2015 who presents to the ED today after a fall at home yesterday.  Patient states he has had decreased intake over the last 2 days, yesterday he checked his blood sugar and it was 25, so he got up to get something to eat and fell to the floor.  He states he did hit his head.  He denied any dizziness or lightheadedness prior to fall.  Patient did not go to the ER that time, he made an appointment with his PCP who saw him earlier today and advised that he report to the ER d/t concern for possible bleed.  Upon arrival patient was noted to have blood pressures in the 80s/50s. CT head was done which showed no acute intracranial processes.  Lab work with a hemoglobin of 8.4 per (seems to be around patient's baseline), creatinine 4.3, BUN 74 (this is increased from his baseline creatinine of 2), troponin 0.06, hepatitis-C antibody was also positive.  ED discussed case with Nephrology who recommended admission & IV fluids.  Patient will be admitted to observation for further treatment of his acute on chronic renal failure.    Code status full  Surrogate decisionmaker wife Michelle Cormier      Overview/Hospital Course:  68 y/o male admitted 12/7 after a fall at home the day PTA and hit his head. He has had decreased oral intake of the past few days, his blood sugar was 53 when he checked it. He reports decreased intake that day, but was getting up to get something to eat and passed out. He was unable to get in with his PCP, so he went to another physician in his group, who  advised him to go to the ER d/t concerns for head bleed after hitting his head. His blood pressure was in 80s/50s on arrival.   CT negative for acute process. Hep C antibody positive. IVF hydration ordered. Patient is a kidney transplant patient in 2015. Nephrology consulted for evaluation.  Last Hgb A1c 9/2022 was 7.1, will repeat this hospitalization.   Ordered a new glucometer and strips for patient, his is malfunctioning.  Patient has also been having diarrhea x 3 days PTA, will order stool samples and CMV.   Creatinine trending down, nephrology following along.       Interval History: awake, alert, sitting up in chair, NAD, wife at bedside. Patient reports feeling a lot better today, Cr trending down 3.5 today (baseline btwn 2-3). Denies diarrhea since yesterday am, stool formed now. Stool studies still pending. Worked with therapy today, denies being lightheaded. Likely d/t tomorrow if Cr continues to trend down, and stool studies resulted.    Review of Systems   Constitutional:  Negative for chills, fatigue and fever.   Respiratory:  Negative for cough, shortness of breath and wheezing.    Gastrointestinal:  Negative for abdominal pain, constipation, diarrhea and nausea.   Neurological:  Positive for weakness. Negative for dizziness.   Objective:     Vital Signs (Most Recent):  Temp: 97.9 °F (36.6 °C) (12/09/22 1204)  Pulse: 90 (12/09/22 1320)  Resp: 18 (12/09/22 1204)  BP: (!) 150/77 (12/09/22 1204)  SpO2: 96 % (12/09/22 1204)   Vital Signs (24h Range):  Temp:  [97.9 °F (36.6 °C)-98.2 °F (36.8 °C)] 97.9 °F (36.6 °C)  Pulse:  [] 90  Resp:  [18-24] 18  SpO2:  [96 %-98 %] 96 %  BP: (117-150)/(60-77) 150/77     Weight: 123.7 kg (272 lb 11.3 oz)  Body mass index is 42.71 kg/m².  No intake or output data in the 24 hours ending 12/09/22 1420   Physical Exam  Vitals and nursing note reviewed.   Constitutional:       Appearance: He is obese.   Cardiovascular:      Rate and Rhythm: Normal rate and regular  rhythm.      Heart sounds: No murmur heard.  Pulmonary:      Effort: Pulmonary effort is normal.      Breath sounds: Normal breath sounds.   Abdominal:      General: Bowel sounds are normal.      Palpations: Abdomen is soft.   Musculoskeletal:         General: Normal range of motion.      Right lower le+ Pitting Edema present.      Left lower le+ Pitting Edema present.   Skin:     General: Skin is warm and dry.   Neurological:      General: No focal deficit present.      Mental Status: He is alert and oriented to person, place, and time.   Psychiatric:         Mood and Affect: Mood normal.         Behavior: Behavior normal.       Significant Labs: All pertinent labs within the past 24 hours have been reviewed.  BMP:   Recent Labs   Lab 22  0614   *      K 3.9      CO2 23   BUN 74*   CREATININE 3.5*   CALCIUM 8.9     CBC:   Recent Labs   Lab 22  1705 22  0556 22  0615   WBC 6.11 4.37 3.54*   HGB 8.4* 7.8* 8.2*   HCT 28.2* 26.4* 27.2*    196 206       Significant Imaging: I have reviewed all pertinent imaging results/findings within the past 24 hours.      Assessment/Plan:      * Acute on chronic renal failure  -acute on chronic  -h/o kidney transplant 2015  -Cr 3.5 today, trending down  -BMP reviewed- noted Estimated Creatinine Clearance: 25.1 mL/min (A) (based on SCr of 3.5 mg/dL (H)). according to latest data  -Monitor UOP  -Renally dose meds and avoid nephrotoxins  -monitor labs    Syncope and collapse  -patient got dizzy and weak causing him to become lightheaded 1 day PTA, he fell and hit his head d/t hypoglycemia  -improved today  -fall precautions  -gentle hydration  -cont to monitor    Hypotension due to hypovolemia  -BP improved  -cont to monitor VS routinely  -IVF stopped today  -hypotension likely due to IVVD from diarrhea, dehydration    Diarrhea  Patient reports watery diarrhea x 3 days PTA  -likely contributing to his syncopal episode,  dehydration, hypotension, and hypoglycemia  -stool samples + CMV pending  -resolved   -precautionary isolation precautions    Dehydration  -see above    Kidney transplanted  -see above    Anemia associated with chronic renal failure  -hemoglobin baseline around 7-8, currently 8.2  -no signs and symptoms of bleeding   -trend H&H  -transfuse PRBCs as needed if Hgb < 7 or symptomatic  -monitor labs    Chronic immunosuppression with Prograf, MMF and prednisone  -renal transplant in 2015   -continue Prograf CellCept and prednisone    Type II diabetes mellitus with renal manifestations  Patient's FSGs are controlled on current medication regimen.  Last A1c reviewed-   Lab Results   Component Value Date    HGBA1C 7.1 (H) 09/19/2022   Current correctional scale  none  Maintain anti-hyperglycemic dose as follows-   Antihyperglycemics (From admission, onward)    Start     Stop Route Frequency Ordered    12/08/22 1928  insulin aspart U-100 pen 0-5 Units         -- SubQ Before meals & nightly PRN 12/08/22 1829        Hold Oral hypoglycemics while patient is in the hospital.    -patient with decreased intake and poor appetite, blood glucose 53 at home  -will hold home Lantus and sulfonylurea at this time   -accuchecks AC/HS  -monitor for signs and symptoms of hypoglycemia  -repeat Hgb A1C pending    Chronic combined systolic and diastolic congestive heart failure  Patient is identified as having Combined Systolic and Diastolic heart failure that is Chronic. CHF is currently controlled. Latest ECHO performed and demonstrates- Results for orders placed during the hospital encounter of 02/18/22    Echo    Interpretation Summary  · Concentric hypertrophy and mildly decreased systolic function.  · Mild tricuspid regurgitation.  · Mild left atrial enlargement.  · The estimated ejection fraction is 40%.  · Left ventricular diastolic dysfunction.  · There is abnormal septal wall motion consistent with left bundle branch block.  · With  normal right ventricular systolic function.  · Normal central venous pressure (3 mmHg).  · The estimated PA systolic pressure is 27 mmHg.  . Continue Beta Blocker and monitor clinical status closely. Monitor on telemetry. Patient is on CHF pathway.  Monitor strict Is&Os and daily weights.  Place on fluid restriction of 1 L. Continue to stress to patient importance of self efficacy and  on diet for CHF.     -patient followed by Dr. Man as an outpatient  -continue home beta-blocker   -holding ARB and Lasix due to worsening creatinine function    Sleep apnea  -continue home CPAP    Chronic anticoagulation  -continue home Eliquis    Severe obesity (BMI >= 40)  Body mass index is 42.71 kg/m². Morbid obesity complicates all aspects of disease management from diagnostic modalities to treatment. Weight loss encouraged and health benefits explained to patient.       VTE Risk Mitigation (From admission, onward)         Ordered     apixaban tablet 5 mg  2 times daily         12/07/22 2339     IP VTE HIGH RISK PATIENT  Once         12/07/22 2339     Place sequential compression device  Until discontinued         12/07/22 2339     Reason for No Pharmacological VTE Prophylaxis  Once        Question:  Reasons:  Answer:  Physician Provided (leave comment)  Comment:  continuing home eliquis    12/07/22 2339                Discharge Planning   GRETEL:      Code Status: Full Code   Is the patient medically ready for discharge?:     Reason for patient still in hospital (select all that apply): Patient trending condition and Laboratory test               Eleanor Pizano NP  Department of Hospital Medicine   [facility]

## 2022-12-09 NOTE — ASSESSMENT & PLAN NOTE
Body mass index is 42.71 kg/m². Morbid obesity complicates all aspects of disease management from diagnostic modalities to treatment. Weight loss encouraged and health benefits explained to patient.

## 2022-12-09 NOTE — ASSESSMENT & PLAN NOTE
-hemoglobin baseline around 7-8, currently 7.8  -no signs and symptoms of bleeding   -trend H&H  -transfuse PRBCs as needed if Hgb < 7 or symptomatic  -monitor labs

## 2022-12-09 NOTE — ASSESSMENT & PLAN NOTE
-acute on chronic  -h/o kidney transplant 2015  -Cr 3.5 today, trending down  -BMP reviewed- noted Estimated Creatinine Clearance: 25.1 mL/min (A) (based on SCr of 3.5 mg/dL (H)). according to latest data  -Monitor UOP  -Renally dose meds and avoid nephrotoxins  -monitor labs

## 2022-12-09 NOTE — ASSESSMENT & PLAN NOTE
-hemoglobin baseline around 7-8, currently 8.2  -no signs and symptoms of bleeding   -trend H&H  -transfuse PRBCs as needed if Hgb < 7 or symptomatic  -monitor labs

## 2022-12-09 NOTE — ASSESSMENT & PLAN NOTE
Unsure what causes it, appears acute  Has resolved now  W/u and stool studies ordered and pending reviewed

## 2022-12-09 NOTE — ASSESSMENT & PLAN NOTE
Body mass index is 43.16 kg/m². Morbid obesity complicates all aspects of disease management from diagnostic modalities to treatment. Weight loss encouraged and health benefits explained to patient.

## 2022-12-09 NOTE — ASSESSMENT & PLAN NOTE
-patient got dizzy and weak causing him to become lightheaded 1 day PTA, he fell and hit his head d/t hypoglycemia  -fall precautions  -gentle hydration  -cont to monitor

## 2022-12-09 NOTE — PLAN OF CARE
Patient remained free from injury. Cardiac and blood glucose monitored. No s/s of acute distress. 12hr chart check complete.

## 2022-12-09 NOTE — PLAN OF CARE
Pt remains free from falls/injuries this shift. Safety precautions maintained. Pain managed with ice and rest. No s/s of acute distress noted. VSS. Tele monitoring per orders. Continuing with plan of care. Chart check completed, all orders reviewed.

## 2022-12-10 VITALS
HEART RATE: 95 BPM | RESPIRATION RATE: 18 BRPM | HEIGHT: 67 IN | DIASTOLIC BLOOD PRESSURE: 56 MMHG | OXYGEN SATURATION: 98 % | SYSTOLIC BLOOD PRESSURE: 121 MMHG | WEIGHT: 272.69 LBS | BODY MASS INDEX: 42.8 KG/M2 | TEMPERATURE: 98 F

## 2022-12-10 DIAGNOSIS — Z94.0 KIDNEY TRANSPLANTED: Primary | ICD-10-CM

## 2022-12-10 PROBLEM — B25.1: Status: ACTIVE | Noted: 2022-12-10

## 2022-12-10 LAB
ANION GAP SERPL CALC-SCNC: 13 MMOL/L (ref 8–16)
ANISOCYTOSIS BLD QL SMEAR: SLIGHT
BASOPHILS # BLD AUTO: 0.01 K/UL (ref 0–0.2)
BASOPHILS NFR BLD: 0.3 % (ref 0–1.9)
BUN SERPL-MCNC: 68 MG/DL (ref 8–23)
CALCIUM SERPL-MCNC: 8.7 MG/DL (ref 8.7–10.5)
CHLORIDE SERPL-SCNC: 101 MMOL/L (ref 95–110)
CO2 SERPL-SCNC: 24 MMOL/L (ref 23–29)
CREAT SERPL-MCNC: 3.1 MG/DL (ref 0.5–1.4)
DIFFERENTIAL METHOD: ABNORMAL
EOSINOPHIL # BLD AUTO: 0 K/UL (ref 0–0.5)
EOSINOPHIL NFR BLD: 0.6 % (ref 0–8)
ERYTHROCYTE [DISTWIDTH] IN BLOOD BY AUTOMATED COUNT: 12.6 % (ref 11.5–14.5)
EST. GFR  (NO RACE VARIABLE): 21 ML/MIN/1.73 M^2
GLUCOSE SERPL-MCNC: 236 MG/DL (ref 70–110)
HCT VFR BLD AUTO: 27.8 % (ref 40–54)
HGB BLD-MCNC: 8.3 G/DL (ref 14–18)
IMM GRANULOCYTES # BLD AUTO: 0.14 K/UL (ref 0–0.04)
IMM GRANULOCYTES NFR BLD AUTO: 3.9 % (ref 0–0.5)
LYMPHOCYTES # BLD AUTO: 0.6 K/UL (ref 1–4.8)
LYMPHOCYTES NFR BLD: 16 % (ref 18–48)
MCH RBC QN AUTO: 25.2 PG (ref 27–31)
MCHC RBC AUTO-ENTMCNC: 29.9 G/DL (ref 32–36)
MCV RBC AUTO: 85 FL (ref 82–98)
MONOCYTES # BLD AUTO: 0.4 K/UL (ref 0.3–1)
MONOCYTES NFR BLD: 12.1 % (ref 4–15)
NEUTROPHILS # BLD AUTO: 2.4 K/UL (ref 1.8–7.7)
NEUTROPHILS NFR BLD: 67.1 % (ref 38–73)
NRBC BLD-RTO: 0 /100 WBC
OVALOCYTES BLD QL SMEAR: ABNORMAL
PLATELET # BLD AUTO: 211 K/UL (ref 150–450)
PLATELET BLD QL SMEAR: ABNORMAL
PMV BLD AUTO: 10.6 FL (ref 9.2–12.9)
POCT GLUCOSE: 218 MG/DL (ref 70–110)
POCT GLUCOSE: 340 MG/DL (ref 70–110)
POTASSIUM SERPL-SCNC: 4 MMOL/L (ref 3.5–5.1)
RBC # BLD AUTO: 3.29 M/UL (ref 4.6–6.2)
SODIUM SERPL-SCNC: 138 MMOL/L (ref 136–145)
TACROLIMUS BLD-MCNC: 7.5 NG/ML (ref 5–15)
WBC # BLD AUTO: 3.63 K/UL (ref 3.9–12.7)

## 2022-12-10 PROCEDURE — 63600175 PHARM REV CODE 636 W HCPCS: Performed by: NURSE PRACTITIONER

## 2022-12-10 PROCEDURE — 36415 COLL VENOUS BLD VENIPUNCTURE: CPT | Performed by: NURSE PRACTITIONER

## 2022-12-10 PROCEDURE — 80048 BASIC METABOLIC PNL TOTAL CA: CPT | Performed by: NURSE PRACTITIONER

## 2022-12-10 PROCEDURE — 99233 PR SUBSEQUENT HOSPITAL CARE,LEVL III: ICD-10-PCS | Mod: ,,, | Performed by: INTERNAL MEDICINE

## 2022-12-10 PROCEDURE — 99233 SBSQ HOSP IP/OBS HIGH 50: CPT | Mod: ,,, | Performed by: INTERNAL MEDICINE

## 2022-12-10 PROCEDURE — 25000003 PHARM REV CODE 250: Performed by: NURSE PRACTITIONER

## 2022-12-10 PROCEDURE — 85025 COMPLETE CBC W/AUTO DIFF WBC: CPT | Performed by: NURSE PRACTITIONER

## 2022-12-10 RX ORDER — VALGANCICLOVIR 450 MG/1
450 TABLET, FILM COATED ORAL DAILY
Qty: 21 TABLET | Refills: 0 | Status: SHIPPED | OUTPATIENT
Start: 2022-12-10 | End: 2023-03-28

## 2022-12-10 RX ORDER — LANCETS
100 EACH MISCELLANEOUS
Qty: 100 EACH | Refills: 11 | Status: SHIPPED | OUTPATIENT
Start: 2022-12-10

## 2022-12-10 RX ORDER — INSULIN PUMP SYRINGE, 3 ML
1 EACH MISCELLANEOUS 4 TIMES DAILY
Qty: 1 EACH | Refills: 0 | Status: SHIPPED | OUTPATIENT
Start: 2022-12-10 | End: 2024-02-22

## 2022-12-10 RX ADMIN — CALCITRIOL CAPSULES 0.25 MCG 0.25 MCG: 0.25 CAPSULE ORAL at 08:12

## 2022-12-10 RX ADMIN — METOPROLOL SUCCINATE 25 MG: 25 TABLET, EXTENDED RELEASE ORAL at 08:12

## 2022-12-10 RX ADMIN — SENNOSIDES AND DOCUSATE SODIUM 1 TABLET: 50; 8.6 TABLET ORAL at 08:12

## 2022-12-10 RX ADMIN — INSULIN ASPART 4 UNITS: 100 INJECTION, SOLUTION INTRAVENOUS; SUBCUTANEOUS at 11:12

## 2022-12-10 RX ADMIN — APIXABAN 5 MG: 2.5 TABLET, FILM COATED ORAL at 08:12

## 2022-12-10 RX ADMIN — PREDNISONE 5 MG: 5 TABLET ORAL at 08:12

## 2022-12-10 RX ADMIN — ASPIRIN 81 MG: 81 TABLET, COATED ORAL at 08:12

## 2022-12-10 RX ADMIN — TAMSULOSIN HYDROCHLORIDE 0.4 MG: 0.4 CAPSULE ORAL at 08:12

## 2022-12-10 RX ADMIN — TACROLIMUS 4 MG: 1 CAPSULE ORAL at 08:12

## 2022-12-10 RX ADMIN — INSULIN ASPART 2 UNITS: 100 INJECTION, SOLUTION INTRAVENOUS; SUBCUTANEOUS at 04:12

## 2022-12-10 RX ADMIN — MYCOPHENOLATE MOFETIL 750 MG: 250 CAPSULE ORAL at 08:12

## 2022-12-10 NOTE — PLAN OF CARE
[facility]  Discharge Final Note    Primary Care Provider: Alix Kowalski DO    Expected Discharge Date: 12/10/2022    Final Discharge Note (most recent)       Final Note - 12/10/22 1554          Final Note    Assessment Type Final Discharge Note     Anticipated Discharge Disposition Home-Health Care Sv   [facility]       Post-Acute Status    Post-Acute Authorization Home Health     Home Health Status Set-up Complete/Auth obtained     Discharge Delays None known at this time                     Important Message from Medicare             Contact Info       Alix Kowalski DO   Specialty: Internal Medicine   Relationship: PCP - General    [address] [place] [address] [address] [address]   Phone: [phone]       Next Steps: Schedule an appointment as soon as possible for a visit in 3 day(s)    Instructions: hospital follow up    Stu Benton MD, [facility]   Specialty: Infectious Diseases, Hospitalist    [address] [place] [id number]   Phone: [phone]       Next Steps: Schedule an appointment as soon as possible for a visit in 1 week(s)    Instructions: hospital follow up    Hany Gonsalez MD   Specialty: Nephrology    [address] [place] [address]   Phone: [phone]       Next Steps: Schedule an appointment as soon as possible for a visit in 1 week(s)    Instructions: hospital follow up    [facility]   Specialty: Home Health Services    [facility] [facility], [address]  [facility] [address]   Phone: [phone]       Next Steps: Follow up

## 2022-12-10 NOTE — DISCHARGE INSTRUCTIONS
You have been started on new medication(s) from this hospitalization. Please take as directed and schedule hospital follow up appointment with your primary providers.    Homehealth with physical/occupational therapy order has been ordered. Someone will be in contact. Homehealth has been ordered to collect lab work weekly. Should there be an issue with obtaining homehealth, we have ordered labs for you to collect at one of the [facility] labs, whichever is convenient ([facility] or [facility]).    A nurse practitioner may be contacting you to assess your status post-hospitalization.

## 2022-12-10 NOTE — ASSESSMENT & PLAN NOTE
-acute on chronic  -h/o kidney transplant 2015  -Cr 3.1 today, trending down  -BMP reviewed- noted Estimated Creatinine Clearance: 28.3 mL/min (A) (based on SCr of 3.1 mg/dL (H)). according to latest data  -Monitor UOP  -Renally dose meds and avoid nephrotoxins  -monitor labs

## 2022-12-10 NOTE — PLAN OF CARE
Pt remains free from falls/injuries this shift. Safety precautions maintained. No s.s of acute distress noted. VSS. Pain controlled with ice and rest. VSS. Tele monitoring per orders. Continuing with plan of care. Chart check complete and all orders reviewed.

## 2022-12-10 NOTE — ASSESSMENT & PLAN NOTE
-normotensive currently  -cont to monitor VS routinely  -hypotension likely due to IVVD from diarrhea, dehydration

## 2022-12-10 NOTE — ASSESSMENT & PLAN NOTE
68 y/o male with h/o of kidney transplant and CKD stage 3 presented with hypotension and diarrhea:     Kidney transplanted  ANGELITO on CKD stage 3.  ANGELITO due to hypotension and diarrhea. Stable, ANGELITO resolving, Cr lower now  Cr tended to fluctuate in the past (pt known to me from clinic), likely due to diuretics and CHF.      H/o of DM and HTN  Was on HD x 2.5 years before transplant      2. Immunosuppression  H/o of living related kidney transplant in 2015 form daughter  Prograf level pending  No change in prograf dose for now until level is back  Meds and doses reviewed     Hypotension due to hypovolemia  Due to diarrhea x 3 days  Improved, clinically stable  Advised pt to continue to eat and drink to replace losses,, but not to over-do drinking as CHF might worsen.     Chronic combined systolic and diastolic congestive heart failure  Hemodynamically stable at this point  Salt/fluids, diet reviewed with pt     Diarrhea  Unsure what causes it, appears acute  Has resolved now  W/u and stool studies ordered and pending reviewed        Plans and recommendations:  As detailed above  Total time spent 40 minutes including time needed to review the records, the   patient evaluation, documentation, face-to-face discussion with the patient,   more than 50% of the time was spent on coordination of care and counseling.

## 2022-12-10 NOTE — ASSESSMENT & PLAN NOTE
Patient's FSGs are controlled on current medication regimen.  Last A1c reviewed-   Lab Results   Component Value Date    HGBA1C 7.1 (H) 12/09/2022   Current correctional scale  none  Maintain anti-hyperglycemic dose as follows-   Antihyperglycemics (From admission, onward)    Start     Stop Route Frequency Ordered    12/08/22 1928  insulin aspart U-100 pen 0-5 Units         -- SubQ Before meals & nightly PRN 12/08/22 1829        Hold Oral hypoglycemics while patient is in the hospital.    -patient with decreased intake and poor appetite, blood glucose 53 at home  -will hold home Lantus and sulfonylurea at this time   -accuchecks AC/HS  -monitor for signs and symptoms of hypoglycemia  -repeat Hgb A1C 7.1  -follow up with PCP on d/c for further management and monitoring

## 2022-12-10 NOTE — PROGRESS NOTES
[facility]  Nephrology  Progress Note    Patient Name: Mitch Whittaker  MRN: [id number]  Admission Date: 12/7/2022  Hospital Length of Stay: 2 days  Attending Provider: Lindsey Ferreira MD   Primary Care Physician: Alix Kowalski DO  Principal Problem:Acute on chronic renal failure    Subjective:     HPI: Noted    Interval History: Pt was seen and examined. Labs and meds reviewed. Discussed with other providers. No new events, pt feels well now, wants to go home, no diarrhea x 2 day, last BM 2 days ago was normal, per pt. No new c/o's. Discussed CMV elevated titer with Dr. Pan, transplant in [place]. Appreciate the recommendations.    Review of patient's allergies indicates:   Allergen Reactions    Lisinopril Other (See Comments)     Other reaction(s):  cough    Actos  [pioglitazone] Other (See Comments)     Other reaction(s): CHF    Metformin Other (See Comments)     Other reaction(s): renal insuff  Other reaction(s): CHF     Current Facility-Administered Medications   Medication Frequency    acetaminophen tablet 650 mg Q4H PRN    albuterol-ipratropium 2.5 mg-0.5 mg/3 mL nebulizer solution 3 mL Q6H PRN    apixaban tablet 5 mg BID    aspirin EC tablet 81 mg Daily    calcitRIOL capsule 0.25 mcg Daily    dextrose 10% bolus 125 mL PRN    dextrose 10% bolus 125 mL PRN    dextrose 10% bolus 250 mL PRN    dextrose 10% bolus 250 mL PRN    ganciclovir (CYTOVENE) 309 mg in sodium chloride 0.9% 100 mL IVPB Q24H    glucagon (human recombinant) injection 1 mg PRN    glucagon (human recombinant) injection 1 mg PRN    glucose chewable tablet 16 g PRN    glucose chewable tablet 16 g PRN    glucose chewable tablet 24 g PRN    glucose chewable tablet 24 g PRN    HYDROcodone-acetaminophen 5-325 mg per tablet 1 tablet Q6H PRN    insulin aspart U-100 pen 0-5 Units QID (AC + HS) PRN    melatonin tablet 6 mg Nightly PRN    metoprolol succinate (TOPROL-XL) 24 hr tablet 25 mg Daily    mycophenolate capsule  750 mg BID    naloxone 0.4 mg/mL injection 0.02 mg PRN    ondansetron injection 4 mg Q8H PRN    pravastatin tablet 80 mg QHS    predniSONE tablet 5 mg Daily    senna-docusate 8.6-50 mg per tablet 1 tablet BID    sodium chloride 0.9% flush 10 mL Q12H PRN    tacrolimus capsule 4 mg BID    tamsulosin 24 hr capsule 0.4 mg Daily       Objective:     Vital Signs (Most Recent):  Temp: 97.7 °F (36.5 °C) (12/10/22 1028)  Pulse: 92 (12/10/22 1028)  Resp: 18 (12/10/22 1028)  BP: (!) 121/56 (12/10/22 1028)  SpO2: 98 % (12/10/22 1028)  (RETIRED) O2 Device (Oxygen Therapy): room air (12/09/22 0733)   Vital Signs (24h Range):  Temp:  [97.7 °F (36.5 °C)-98 °F (36.7 °C)] 97.7 °F (36.5 °C)  Pulse:  [] 92  Resp:  [18-19] 18  SpO2:  [93 %-99 %] 98 %  BP: (118-204)/(56-88) 121/56     Weight: 123.7 kg (272 lb 11.3 oz) (12/09/22 0049)  Body mass index is 42.71 kg/m².  Body surface area is 2.42 meters squared.    I/O last 3 completed shifts:  In: 200.7 [P.O.:120; IV Piggyback:80.7]  Out: -     Physical Exam  Vitals and nursing note reviewed.   Cardiovascular:      Rate and Rhythm: Normal rate and regular rhythm.      Pulses: Normal pulses.      Heart sounds: Normal heart sounds.   Pulmonary:      Effort: Pulmonary effort is normal.      Breath sounds: Normal breath sounds.   Abdominal:      General: Abdomen is flat.      Tenderness: There is no abdominal tenderness.   Musculoskeletal:      Right lower leg: No edema.      Left lower leg: No edema.   Neurological:      Mental Status: He is oriented to person, place, and time.   Psychiatric:         Behavior: Behavior normal.       Significant Labs: reviewed  BMP  Lab Results   Component Value Date     12/10/2022    K 4.0 12/10/2022     12/10/2022    CO2 24 12/10/2022    BUN 68 (H) 12/10/2022    CREATININE 3.1 (H) 12/10/2022    CALCIUM 8.7 12/10/2022    ANIONGAP 13 12/10/2022    EGFRNORACEVR 21 (A) 12/10/2022     Lab Results   Component Value Date    WBC 3.63 (L)  12/10/2022    HGB 8.3 (L) 12/10/2022    HCT 27.8 (L) 12/10/2022    MCV 85 12/10/2022     12/10/2022     CMV DNA quant,     Prograf level 7.5    Significant Imaging: reviewed    Assessment/Plan:     66 y/o male with h/o of kidney transplant and CKD stage 3 presented with hypotension and diarrhea:     Kidney transplanted  ANGELITO on CKD stage 3. s Cr now lower, improving daily. ANGELITO resolving  ANGELITO due to hypotension and diarrhea.   Stable renal function  Cr tended to fluctuate in the past (pt known to me from clinic), likely due to diuretics and CHF.      H/o of DM and HTN  Was on HD x 2.5 years before transplant      Immunosuppression  H/o of living related kidney transplant in 2015 form daughter  Prograf level within therapeutic range, no change in prograf dose  Meds and doses reviewed     CMV: elevated titer  Discussed with transplant in [place]  Doubt diarrhea related  Currently on ganciclovir: will d/c and switch to valcyte  May d/c home with q week x 2 CMV DNA quant by PCR iter, home health can get  Please set up post hospital f/u at ID and renal clinic    Hypotension due to hypovolemia  Due to diarrhea x 3 days  Has resolved. BP normal and controlled  Clinically stable  Advised pt to continue to eat and drink to replace losses,, but not to over-do drinking as CHF might worsen.     Chronic combined systolic and diastolic congestive heart failure  Hemodynamically stable at this point  Salt/fluids, diet reviewed with pt     Diarrhea  Has resolved x 2 days  Doubt related to CMV  W/u and stool studies ordered unremarkable  Self limited      Plans and recommendations:  As detailed above  Total time spent 40 minutes including time needed to review the records, the   patient evaluation, documentation, face-to-face discussion with the patient,   more than 50% of the time was spent on coordination of care and counseling.          Hany Gonsalez MD  Nephrology  [facility]

## 2022-12-10 NOTE — ASSESSMENT & PLAN NOTE
-patient got dizzy and weak causing him to become lightheaded 1 day PTA, he fell and hit his head d/t hypoglycemia  -resolved, no longer dizzy  -fall precautions  -cont to monitor

## 2022-12-10 NOTE — ASSESSMENT & PLAN NOTE
-hemoglobin baseline around 7-8, currently 8.3  -no signs and symptoms of bleeding   -trend H&H  -transfuse PRBCs as needed if Hgb < 7 or symptomatic  -monitor labs

## 2022-12-10 NOTE — ASSESSMENT & PLAN NOTE
Patient reports watery diarrhea x 3 days PTA  -likely contributing to his syncopal episode, dehydration, hypotension, and hypoglycemia  -resolved, now with formed stools  -C-diff negative, CMV   -ganciclovir IV BID started 12/9

## 2022-12-10 NOTE — NURSING
Telemetry called and stated patient had a 11 beat run of vtach. Patient denies any chest pain and is resting in chair without any discomfort. MD notified, states to continue to monitor.

## 2022-12-10 NOTE — SUBJECTIVE & OBJECTIVE
Interval History: awake, alert, sitting up in bed, wife and daughter at bedside, NAD. Patient reports feeling good today, still has BLE edema unchanged. CMV , started on ganciclovir IV. C-diff negative, remove special precautions. 11 runs of VT on tele monitor overnight, asymptomatic, was moving to get in chair at the time.  Waiting on nephro and transplant specialist final treatment recommendations.    Review of Systems   Constitutional:  Negative for chills, fatigue and fever.   Respiratory:  Negative for cough, shortness of breath and wheezing.    Gastrointestinal:  Negative for abdominal pain, constipation, diarrhea and nausea.   Neurological:  Positive for weakness. Negative for dizziness.   Objective:     Vital Signs (Most Recent):  Temp: 97.7 °F (36.5 °C) (12/10/22 1028)  Pulse: 92 (12/10/22 1028)  Resp: 18 (12/10/22 1028)  BP: (!) 121/56 (12/10/22 1028)  SpO2: 98 % (12/10/22 1028)   Vital Signs (24h Range):  Temp:  [97.7 °F (36.5 °C)-98 °F (36.7 °C)] 97.7 °F (36.5 °C)  Pulse:  [] 92  Resp:  [18-19] 18  SpO2:  [93 %-99 %] 98 %  BP: (118-204)/(56-88) 121/56     Weight: 123.7 kg (272 lb 11.3 oz)  Body mass index is 42.71 kg/m².    Intake/Output Summary (Last 24 hours) at 12/10/2022 1045  Last data filed at 12/10/2022 0609  Gross per 24 hour   Intake 200.73 ml   Output --   Net 200.73 ml      Physical Exam  Vitals and nursing note reviewed.   Constitutional:       Appearance: He is obese.   Cardiovascular:      Rate and Rhythm: Normal rate and regular rhythm.      Heart sounds: No murmur heard.  Pulmonary:      Effort: Pulmonary effort is normal.      Breath sounds: Normal breath sounds.   Abdominal:      General: Bowel sounds are normal.      Palpations: Abdomen is soft.   Musculoskeletal:         General: Normal range of motion.      Right lower le+ Pitting Edema present.      Left lower le+ Pitting Edema present.   Skin:     General: Skin is warm and dry.   Neurological:      General: No  focal deficit present.      Mental Status: He is alert and oriented to person, place, and time.   Psychiatric:         Mood and Affect: Mood normal.         Behavior: Behavior normal.       Significant Labs: All pertinent labs within the past 24 hours have been reviewed.  BMP:   Recent Labs   Lab 12/10/22  0534   *      K 4.0      CO2 24   BUN 68*   CREATININE 3.1*   CALCIUM 8.7     CBC:   Recent Labs   Lab 12/09/22  0615 12/10/22  0534   WBC 3.54* 3.63*   HGB 8.2* 8.3*   HCT 27.2* 27.8*    211       Significant Imaging: I have reviewed all pertinent imaging results/findings within the past 24 hours.

## 2022-12-10 NOTE — PROGRESS NOTES
[facility]  Progress Note    Patient Name: Mitch Whittaker  MRN: [id number]  Patient Class: IP- Inpatient   Admission Date: 12/7/2022  Length of Stay: 2 days  Attending Physician: Lindsey Ferreira MD  Primary Care Provider: Alix Kowalski DO        Subjective:     Principal Problem:Acute on chronic renal failure      HPI:  Mr. Whittaker is a 67-year-old male with a history of  HFrEF 40-45%, CKD, DM2, MARION, HTN, HLD, ESRD s/p renal transplant in 2015 who presents to the ED today after a fall at home yesterday.  Patient states he has had decreased intake over the last 2 days, yesterday he checked his blood sugar and it was 25, so he got up to get something to eat and fell to the floor.  He states he did hit his head.  He denied any dizziness or lightheadedness prior to fall.  Patient did not go to the ER that time, he made an appointment with his PCP who saw him earlier today and advised that he report to the ER d/t concern for possible bleed.  Upon arrival patient was noted to have blood pressures in the 80s/50s. CT head was done which showed no acute intracranial processes.  Lab work with a hemoglobin of 8.4 per (seems to be around patient's baseline), creatinine 4.3, BUN 74 (this is increased from his baseline creatinine of 2), troponin 0.06, hepatitis-C antibody was also positive.  ED discussed case with Nephrology who recommended admission & IV fluids.  Patient will be admitted to observation for further treatment of his acute on chronic renal failure.    Code status full  Surrogate decisionmaker wife Michelle Cormier      Overview/Hospital Course:  68 y/o male admitted 12/7 after a fall at home the day PTA and hit his head. He has had decreased oral intake of the past few days, his blood sugar was 53 when he checked it. He reports decreased intake that day, but was getting up to get something to eat and passed out. He was unable to get in with his PCP, so he went to another physician in his group, who  advised him to go to the ER d/t concerns for head bleed after hitting his head. His blood pressure was in 80s/50s on arrival.   CT negative for acute process. Hep C antibody positive. IVF hydration ordered. Patient is a kidney transplant patient in 2015. Nephrology consulted for evaluation.  Last Hgb A1c 9/2022 was 7.1, will repeat this hospitalization.   Ordered a new glucometer and strips for patient, his is malfunctioning.  Patient has also been having diarrhea x 3 days PTA, stool samples ordered, C-diff negative. CMV , started on IV ganciclovir BID. Will likely need 2-4 weeks, nephrology following and will discuss with transplant specialist for definitive plan.   Overnight on 12/9, he had 11 beats of VT on telemetry strip, patient asymptomatic, reports he was getting up to get in chair but did not feel light head or palpitations, will continue to monitor.  Creatinine trending down, nephrology following along.       Interval History: awake, alert, sitting up in bed, wife and daughter at bedside, NAD. Patient reports feeling good today, still has BLE edema unchanged. CMV , started on ganciclovir IV. C-diff negative, remove special precautions. 11 runs of VT on tele monitor overnight, asymptomatic, was moving to get in chair at the time.  Waiting on nephro and transplant specialist final treatment recommendations.    Review of Systems   Constitutional:  Negative for chills, fatigue and fever.   Respiratory:  Negative for cough, shortness of breath and wheezing.    Gastrointestinal:  Negative for abdominal pain, constipation, diarrhea and nausea.   Neurological:  Positive for weakness. Negative for dizziness.   Objective:     Vital Signs (Most Recent):  Temp: 97.7 °F (36.5 °C) (12/10/22 1028)  Pulse: 92 (12/10/22 1028)  Resp: 18 (12/10/22 1028)  BP: (!) 121/56 (12/10/22 1028)  SpO2: 98 % (12/10/22 1028)   Vital Signs (24h Range):  Temp:  [97.7 °F (36.5 °C)-98 °F (36.7 °C)] 97.7 °F (36.5 °C)  Pulse:   [] 92  Resp:  [18-19] 18  SpO2:  [93 %-99 %] 98 %  BP: (118-204)/(56-88) 121/56     Weight: 123.7 kg (272 lb 11.3 oz)  Body mass index is 42.71 kg/m².    Intake/Output Summary (Last 24 hours) at 12/10/2022 1045  Last data filed at 12/10/2022 0609  Gross per 24 hour   Intake 200.73 ml   Output --   Net 200.73 ml      Physical Exam  Vitals and nursing note reviewed.   Constitutional:       Appearance: He is obese.   Cardiovascular:      Rate and Rhythm: Normal rate and regular rhythm.      Heart sounds: No murmur heard.  Pulmonary:      Effort: Pulmonary effort is normal.      Breath sounds: Normal breath sounds.   Abdominal:      General: Bowel sounds are normal.      Palpations: Abdomen is soft.   Musculoskeletal:         General: Normal range of motion.      Right lower le+ Pitting Edema present.      Left lower le+ Pitting Edema present.   Skin:     General: Skin is warm and dry.   Neurological:      General: No focal deficit present.      Mental Status: He is alert and oriented to person, place, and time.   Psychiatric:         Mood and Affect: Mood normal.         Behavior: Behavior normal.       Significant Labs: All pertinent labs within the past 24 hours have been reviewed.  BMP:   Recent Labs   Lab 12/10/22  0534   *      K 4.0      CO2 24   BUN 68*   CREATININE 3.1*   CALCIUM 8.7     CBC:   Recent Labs   Lab 22  0615 12/10/22  0534   WBC 3.54* 3.63*   HGB 8.2* 8.3*   HCT 27.2* 27.8*    211       Significant Imaging: I have reviewed all pertinent imaging results/findings within the past 24 hours.      Assessment/Plan:      * Acute on chronic renal failure  -acute on chronic  -h/o kidney transplant 2015  -Cr 3.1 today, trending down  -BMP reviewed- noted Estimated Creatinine Clearance: 28.3 mL/min (A) (based on SCr of 3.1 mg/dL (H)). according to latest data  -Monitor UOP  -Renally dose meds and avoid nephrotoxins  -monitor labs    Syncope and collapse  -patient  got dizzy and weak causing him to become lightheaded 1 day PTA, he fell and hit his head d/t hypoglycemia  -resolved, no longer dizzy  -fall precautions  -cont to monitor    Hypotension due to hypovolemia  -normotensive currently  -cont to monitor VS routinely  -hypotension likely due to IVVD from diarrhea, dehydration    Cytomegalic inclusion virus hepatitis  -CMV   -ganciclovir IV BID started 12/9, will likely need 2-4 weeks therapy  -GI following, will get with transplant specialist on definitive treatment  -monitor    Diarrhea of presumed infectious origin  Patient reports watery diarrhea x 3 days PTA  -likely contributing to his syncopal episode, dehydration, hypotension, and hypoglycemia  -resolved, now with formed stools  -C-diff negative, CMV   -ganciclovir IV BID started 12/9    Dehydration  -see above    Kidney transplanted  -see above    Anemia associated with chronic renal failure  -hemoglobin baseline around 7-8, currently 8.3  -no signs and symptoms of bleeding   -trend H&H  -transfuse PRBCs as needed if Hgb < 7 or symptomatic  -monitor labs    Chronic immunosuppression with Prograf, MMF and prednisone  -renal transplant in 2015   -continue Prograf CellCept and prednisone    Type II diabetes mellitus with renal manifestations  Patient's FSGs are controlled on current medication regimen.  Last A1c reviewed-   Lab Results   Component Value Date    HGBA1C 7.1 (H) 12/09/2022   Current correctional scale  none  Maintain anti-hyperglycemic dose as follows-   Antihyperglycemics (From admission, onward)    Start     Stop Route Frequency Ordered    12/08/22 1928  insulin aspart U-100 pen 0-5 Units         -- SubQ Before meals & nightly PRN 12/08/22 1829        Hold Oral hypoglycemics while patient is in the hospital.    -patient with decreased intake and poor appetite, blood glucose 53 at home  -will hold home Lantus and sulfonylurea at this time   -accuchecks AC/HS  -monitor for signs and  symptoms of hypoglycemia  -repeat Hgb A1C 7.1  -follow up with PCP on d/c for further management and monitoring    Chronic combined systolic and diastolic congestive heart failure  Patient is identified as having Combined Systolic and Diastolic heart failure that is Chronic. CHF is currently controlled. Latest ECHO performed and demonstrates- Results for orders placed during the hospital encounter of 02/18/22    Echo    Interpretation Summary  · Concentric hypertrophy and mildly decreased systolic function.  · Mild tricuspid regurgitation.  · Mild left atrial enlargement.  · The estimated ejection fraction is 40%.  · Left ventricular diastolic dysfunction.  · There is abnormal septal wall motion consistent with left bundle branch block.  · With normal right ventricular systolic function.  · Normal central venous pressure (3 mmHg).  · The estimated PA systolic pressure is 27 mmHg.  . Continue Beta Blocker and monitor clinical status closely. Monitor on telemetry. Patient is on CHF pathway.  Monitor strict Is&Os and daily weights.  Place on fluid restriction of 1 L. Continue to stress to patient importance of self efficacy and  on diet for CHF.     -patient followed by Dr. Man as an outpatient  -continue home beta-blocker   -holding ARB and Lasix due to worsening creatinine function    Sleep apnea  -continue home CPAP    Chronic anticoagulation  -continue home Eliquis    Severe obesity (BMI >= 40)  Body mass index is 42.71 kg/m². Morbid obesity complicates all aspects of disease management from diagnostic modalities to treatment. Weight loss encouraged and health benefits explained to patient.       VTE Risk Mitigation (From admission, onward)         Ordered     apixaban tablet 5 mg  2 times daily         12/07/22 2339     IP VTE HIGH RISK PATIENT  Once         12/07/22 2339     Place sequential compression device  Until discontinued         12/07/22 2339     Reason for No Pharmacological VTE Prophylaxis   Once        Question:  Reasons:  Answer:  Physician Provided (leave comment)  Comment:  continuing home eliquis    12/07/22 3479                Discharge Planning   GRETEL: 12/10/2022     Code Status: Full Code   Is the patient medically ready for discharge?:     Reason for patient still in hospital (select all that apply): Treatment and Consult recommendations                 Eleanor Pizano NP  Department of Hospital Medicine   '[facility]

## 2022-12-10 NOTE — DISCHARGE SUMMARY
[facility]  Discharge Summary      Patient Name: Mitch Whittaker  MRN: [id number]  BRYANT: [id number]  Patient Class: IP- Inpatient  Admission Date: 12/7/2022  Hospital Length of Stay: 2 days  Discharge Date and Time:  12/10/2022 5:27 PM  Attending Physician: Mellisa att. providers found   Discharging Provider: Eleanor Pizano NP  Primary Care Provider: Alix Kowalski DO    Primary Care Team: [facility] MEDICINE E    HPI:   Mr. Whittaker is a 67-year-old male with a history of  HFrEF 40-45%, CKD, DM2, MARION, HTN, HLD, ESRD s/p renal transplant in 2015 who presents to the ED today after a fall at home yesterday.  Patient states he has had decreased intake over the last 2 days, yesterday he checked his blood sugar and it was 25, so he got up to get something to eat and fell to the floor.  He states he did hit his head.  He denied any dizziness or lightheadedness prior to fall.  Patient did not go to the ER that time, he made an appointment with his PCP who saw him earlier today and advised that he report to the ER d/t concern for possible bleed.  Upon arrival patient was noted to have blood pressures in the 80s/50s. CT head was done which showed no acute intracranial processes.  Lab work with a hemoglobin of 8.4 per (seems to be around patient's baseline), creatinine 4.3, BUN 74 (this is increased from his baseline creatinine of 2), troponin 0.06, hepatitis-C antibody was also positive.  ED discussed case with Nephrology who recommended admission & IV fluids.  Patient will be admitted to observation for further treatment of his acute on chronic renal failure.    Code status full  Surrogate decisionmaker wife Michelle Cormier      * No surgery found *      Hospital Course:   66 y/o male admitted 12/7 after a fall at home the day PTA and hit his head. He has had decreased oral intake of the past few days, his blood sugar was 53 when he checked it. He reports decreased intake that day, but was getting up to get  something to eat and passed out. He was unable to get in with his PCP, so he went to another physician in his group, who advised him to go to the ER d/t concerns for head bleed after hitting his head. His blood pressure was in 80s/50s on arrival.   CT negative for acute process. Hep C antibody positive. IVF hydration ordered. Patient is a kidney transplant patient in 2015. Nephrology consulted for evaluation.  Last Hgb A1c 9/2022 was 7.1, will repeat this hospitalization.   Ordered a new glucometer and strips for patient, his is malfunctioning, he will  from [facility] on Monday.   Patient has also been having diarrhea x 3 days PTA, stool samples ordered, C-diff negative. CMV , started on IV ganciclovir BID.   Overnight on 12/9, he had 11 beats of VT on telemetry strip, patient asymptomatic, reports he was getting up to get in chair but did not feel light head or palpitations, will continue to monitor.  Creatinine trending down, 3.1 on the day of discharge.     Nephrology, Dr. Gonsalez spoke with patients transplant specialist, Dr. Pan at [facility] and cleared patient for discharge with q week x 2 CMV DNA quant by PCR titer, with home health. He was given a prescription for Valcyte to take daily PO x 21 days.     He is to follow up with Dr. Gonsalez in 1-2 weeks for hospital follow up.     Patient seen and examined prior to discharge.   All questions and concerns were addressed prior to discharge.       Goals of Care Treatment Preferences:  Code Status: Full Code      Consults:   Consults (From admission, onward)          Status Ordering Provider     Inpatient consult to Infectious Diseases  Once        Provider:  Stu Benton MD, NAGA Lopez     Inpatient consult to Nephrology  Once        Provider:  Kelsie Pittman MD    Completed DARRON PALMER            No new Assessment & Plan notes have been filed under this hospital service since the last note was  generated.  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] 12/10/2022 Yes    Diarrhea of presumed infectious origin [R19.7] 12/08/2022 Yes    Dehydration [E86.0] 12/08/2022 Yes    Kidney transplanted [Z94.0] 12/08/2022 Not Applicable    Anemia associated with chronic renal failure [N18.9, D63.1]  Yes    Chronic immunosuppression with Prograf, MMF and prednisone [Z29.8] 06/18/2015 Not Applicable     Chronic    Type II diabetes mellitus with renal manifestations [E11.29]  Yes    Chronic combined systolic and diastolic congestive heart failure [I50.42] 03/15/2019 Yes    Sleep apnea [G47.30] 06/13/2013 Yes    Chronic anticoagulation [Z79.01] 12/07/2022 Not Applicable    Severe obesity (BMI >= 40) [E66.01] 12/08/2022 Yes      Problems Resolved During this Admission:       Discharged Condition: good    Disposition: Home-Health Care [facility]    Follow Up:   Follow-up Information       Alix Kowalski DO. Schedule an appointment as soon as possible for a visit in 3 day(s).    Specialty: Internal Medicine  Why: hospital follow up  Contact information:  [address] [place] [address] DR Marlena [place] [id number] [phone]               Stu Benton MD, FIDSA. Schedule an appointment as soon as possible for a visit in 1 week(s).    Specialties: Infectious Diseases, Hospitalist  Why: hospital follow up  Contact information:  [address] [place] [address] [address]  [place] [address] [phone]               Hany Gonsalez MD. Schedule an appointment as soon as possible for a visit in 1 week(s).    Specialty: Nephrology  Why: hospital follow up  Contact information:  [address]  [place] [address] [phone]               [facility] Follow  up.    Specialty: Home Health Services  Contact information:  [address]  [place] [address]  [phone]                           Patient Instructions:      Ambulatory referral/consult to Home Health   Standing Status: Future   Referral Priority: Routine Referral Type: Home Health   Referral Reason: Specialty Services Required   Requested Specialty: Home Health Services   Number of Visits Requested: 1     Ambulatory referral/consult to [facility]   Standing Status: Future   Referral Priority: Routine Referral Type: Consultation   Referral Reason: Specialty Services Required   Number of Visits Requested: 1     Diet Cardiac     Diet diabetic     Notify your health care provider if you experience any of the following:  temperature >100.4     Notify your health care provider if you experience any of the following:  persistent nausea and vomiting or diarrhea     Notify your health care provider if you experience any of the following:  severe persistent headache     Notify your health care provider if you experience any of the following:  persistent dizziness, light-headedness, or visual disturbances     Notify your health care provider if you experience any of the following:  increased confusion or weakness     Activity as tolerated       Significant Diagnostic Studies: Labs: BMP:   Recent Labs   Lab 12/09/22  0614 12/10/22  0534   * 236*    138   K 3.9 4.0    101   CO2 23 24   BUN 74* 68*   CREATININE 3.5* 3.1*   CALCIUM 8.9 8.7   , CBC   Recent Labs   Lab 12/09/22  0615 12/10/22  0534   WBC 3.54* 3.63*   HGB 8.2* 8.3*   HCT 27.2* 27.8*    211   , A1C:   Recent Labs   Lab 09/19/22  0822 12/09/22  0614   HGBA1C 7.1* 7.1*   , and All labs within the past 24 hours have been reviewed  Microbiology: Blood Culture   Lab Results   Component Value Date    LABBLOO No growth after 5 days. 03/20/2022     Radiology: X-Ray: CXR: X-Ray Chest 1  View (CXR):   Results for orders placed or performed during the hospital encounter of 12/07/22   X-Ray Chest 1 View    Narrative    EXAMINATION:  XR CHEST 1 VIEW    CLINICAL HISTORY:  Syncope and collapse    TECHNIQUE:  Single frontal view of the chest was performed.    COMPARISON:  None    FINDINGS:  Low lung volumes.  Cardiomegaly.  Mild central pulmonary vascular crowding.  Right axillary vascular stent.  Correlate clinically for element of CHF.    Bones are intact.      Impression    As above      Electronically signed by: Daniele Whatley  Date:    12/07/2022  Time:    17:28       Pending Diagnostic Studies:       Procedure Component Value Units Date/Time    Cytomegalovirus (Cmv) Ab, Igm [[id number]] Collected: 12/08/22 1245    Order Status: Sent Lab Status: In process Updated: 12/08/22 2108    Specimen: Blood     H. pylori antigen, stool [[id number]] Collected: 12/08/22 1644    Order Status: Sent Lab Status: In process Updated: 12/08/22 1645    Specimen: Stool     Troponin I [[id number]] Collected: 12/07/22 1712    Order Status: Sent Lab Status: In process Updated: 12/07/22 1713    Specimen: Blood     Urinalysis [[id number]] Collected: 12/07/22 1818    Order Status: Sent Lab Status: In process Updated: 12/07/22 1818    Specimen: Urine, Clean Catch            Medications:  Reconciled Home Medications:      Medication List        START taking these medications      blood-glucose meter Misc  Commonly known as: CONTOUR METER  Use to test four times a day     lancets Misc  Use to check BG 4 (four) times daily before meals and nightly.  Replaces: lancets 33 gauge Misc     valGANciclovir 450 mg Tab  Commonly known as: VALCYTE  Take 1 tablet (450 mg total) by mouth once daily. for 21 days            CONTINUE taking these medications      aspirin 81 MG EC tablet  Commonly known as: ECOTRIN  Take 1 tablet by mouth Daily.     blood sugar diagnostic Strp  Use to test four times per day     calcitRIOL 0.25 MCG Cap  Commonly known  as: ROCALTROL  Take 1 capsule (0.25 mcg total) by mouth once daily.     ELIQUIS 5 mg Tab  Generic drug: apixaban  Take 1 tablet (5 mg total) by mouth 2 (two) times daily.     ENTRESTO  mg per tablet  Generic drug: sacubitriL-valsartan  Take 1 tablet by mouth 2 (two) times daily.     famotidine 20 MG tablet  Commonly known as: PEPCID  TAKE ONE TABLET BY MOUTH EVERY EVENING     fish oil-omega-3 fatty acids 300-1,000 mg capsule  Take 1 g by mouth once daily.     furosemide 80 MG tablet  Commonly known as: LASIX  Take one-half tablet (40 mg) by mouth 2 (two) times daily.     glimepiride 2 MG tablet  Commonly known as: AMARYL  Take 1 tablet (2 mg total) by mouth before breakfast.     insulin aspart U-100 100 unit/mL (3 mL) Inpn pen  Commonly known as: NovoLOG Flexpen U-100 Insulin  Inject 25 Units into the skin 3 (three) times daily with meals.     insulin glargine 100 units/mL SubQ pen  Commonly known as: LANTUS SOLOSTAR U-100 INSULIN  Inject 35 Units into the skin 2 (two) times daily.     ketoconazole 200 mg Tab  Commonly known as: NIZORAL  Take HALF A tablet (100 mg total) by mouth once daily.     magnesium oxide 400 mg (241.3 mg magnesium) tablet  Commonly known as: MAG-OX  Take 1 tablet (400 mg total) by mouth once daily.     metOLazone 2.5 MG tablet  Commonly known as: ZAROXOLYN  Take 1 tablet by mouth on Monday and Friday as directed     metoprolol succinate 25 MG 24 hr tablet  Commonly known as: TOPROL-XL  Take 1 tablet (25 mg total) by mouth once daily.     montelukast 10 mg tablet  Commonly known as: SINGULAIR  Take 1 tablet (10 mg total) by mouth every evening.     multivitamin tablet  Commonly known as: THERAGRAN  Take 1 tablet by mouth once daily.     mupirocin 2 % ointment  Commonly known as: BACTROBAN  Apply topically 3 (three) times daily. Use as directed on the leg wound.     mycophenolate 250 mg Cap  Commonly known as: CELLCEPT  Take 3 capsules (750 mg total) by mouth 2 (two) times daily.    "  ondansetron 4 MG tablet  Commonly known as: ZOFRAN  Take 1 tablet (4 mg total) by mouth every 6 (six) hours.     * pen needle, diabetic 31 gauge x 5/16" Ndle  Commonly known as: BD ULTRA-FINE SHORT PEN NEEDLE  USE TO INJECT INSULIN TWICE A DAY     * BD ULTRA-FINE MINI PEN NEEDLE 31 gauge x 3/16" Ndle  Generic drug: pen needle, diabetic  Use to inject insulin as needed up to 4 times daily     potassium chloride SA 20 MEQ tablet  Commonly known as: K-DUR,KLOR-CON  Take 2 tablets (40 mEq total) by mouth once daily.     predniSONE 5 MG tablet  Commonly known as: DELTASONE  Take 1 tablet (5 mg total) by mouth once daily.     promethazine-dextromethorphan 6.25-15 mg/5 mL Syrp  Commonly known as: PROMETHAZINE-DM  Take by mouth.     rosuvastatin 40 MG Tab  Commonly known as: CRESTOR  Take 1 tablet (40 mg total) by mouth once daily in the evening.     tacrolimus 1 MG Cap  Commonly known as: PROGRAF  Take 4 capsules (4 mg total) by mouth every 12 (twelve) hours.     tamsulosin 0.4 mg Cap  Commonly known as: FLOMAX  Take 1 capsule (0.4 mg total) by mouth once daily.     triamcinolone acetonide 0.1% 0.1 % ointment  Commonly known as: KENALOG  Apply topically 2 (two) times daily. Use on bilateral lower legs.           * This list has 2 medication(s) that are the same as other medications prescribed for you. Read the directions carefully, and ask your doctor or other care provider to review them with you.                STOP taking these medications      lancets 33 gauge Misc  Replaced by: lancets Misc              Indwelling Lines/Drains at time of discharge:   Lines/Drains/Airways       Drain  Duration                  Hemodialysis AV Fistula Right upper arm -- days                    Time spent on the discharge of patient: 40 minutes         Eleanor Pizano NP  Department of Hospital Medicine  [facility]  "
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
## 2022-12-10 NOTE — ASSESSMENT & PLAN NOTE
-CMV   -ganciclovir IV BID started 12/9, will likely need 2-4 weeks therapy  -GI following, will get with transplant specialist on definitive treatment  -monitor

## 2022-12-10 NOTE — SUBJECTIVE & OBJECTIVE
Interval History: Pt was seen and examined. Labs and meds reviewed. Discussed with other providers. No new events, pt feels well now, wants to go home, no diarrhea x 2 day, last BM 2 days ago was normal, per pt. No new c/o's. Discussed CMV elevated titer with Dr. Pan, transplant in [place]. Appreciate the recommendations.    Review of patient's allergies indicates:   Allergen Reactions    Lisinopril Other (See Comments)     Other reaction(s):  cough    Actos  [pioglitazone] Other (See Comments)     Other reaction(s): CHF    Metformin Other (See Comments)     Other reaction(s): renal insuff  Other reaction(s): CHF     Current Facility-Administered Medications   Medication Frequency    acetaminophen tablet 650 mg Q4H PRN    albuterol-ipratropium 2.5 mg-0.5 mg/3 mL nebulizer solution 3 mL Q6H PRN    apixaban tablet 5 mg BID    aspirin EC tablet 81 mg Daily    calcitRIOL capsule 0.25 mcg Daily    dextrose 10% bolus 125 mL PRN    dextrose 10% bolus 125 mL PRN    dextrose 10% bolus 250 mL PRN    dextrose 10% bolus 250 mL PRN    ganciclovir (CYTOVENE) 309 mg in sodium chloride 0.9% 100 mL IVPB Q24H    glucagon (human recombinant) injection 1 mg PRN    glucagon (human recombinant) injection 1 mg PRN    glucose chewable tablet 16 g PRN    glucose chewable tablet 16 g PRN    glucose chewable tablet 24 g PRN    glucose chewable tablet 24 g PRN    HYDROcodone-acetaminophen 5-325 mg per tablet 1 tablet Q6H PRN    insulin aspart U-100 pen 0-5 Units QID (AC + HS) PRN    melatonin tablet 6 mg Nightly PRN    metoprolol succinate (TOPROL-XL) 24 hr tablet 25 mg Daily    mycophenolate capsule 750 mg BID    naloxone 0.4 mg/mL injection 0.02 mg PRN    ondansetron injection 4 mg Q8H PRN    pravastatin tablet 80 mg QHS    predniSONE tablet 5 mg Daily    senna-docusate 8.6-50 mg per tablet 1 tablet BID    sodium chloride 0.9% flush 10 mL Q12H PRN    tacrolimus capsule 4 mg BID    tamsulosin 24 hr capsule 0.4 mg Daily        Objective:     Vital Signs (Most Recent):  Temp: 97.7 °F (36.5 °C) (12/10/22 1028)  Pulse: 92 (12/10/22 1028)  Resp: 18 (12/10/22 1028)  BP: (!) 121/56 (12/10/22 1028)  SpO2: 98 % (12/10/22 1028)  (RETIRED) O2 Device (Oxygen Therapy): room air (12/09/22 0733)   Vital Signs (24h Range):  Temp:  [97.7 °F (36.5 °C)-98 °F (36.7 °C)] 97.7 °F (36.5 °C)  Pulse:  [] 92  Resp:  [18-19] 18  SpO2:  [93 %-99 %] 98 %  BP: (118-204)/(56-88) 121/56     Weight: 123.7 kg (272 lb 11.3 oz) (12/09/22 0049)  Body mass index is 42.71 kg/m².  Body surface area is 2.42 meters squared.    I/O last 3 completed shifts:  In: 200.7 [P.O.:120; IV Piggyback:80.7]  Out: -     Physical Exam  Vitals and nursing note reviewed.   Cardiovascular:      Rate and Rhythm: Normal rate and regular rhythm.      Pulses: Normal pulses.      Heart sounds: Normal heart sounds.   Pulmonary:      Effort: Pulmonary effort is normal.      Breath sounds: Normal breath sounds.   Abdominal:      General: Abdomen is flat.      Tenderness: There is no abdominal tenderness.   Musculoskeletal:      Right lower leg: No edema.      Left lower leg: No edema.   Neurological:      Mental Status: He is oriented to person, place, and time.   Psychiatric:         Behavior: Behavior normal.       Significant Labs: reviewed  BMP  Lab Results   Component Value Date     12/10/2022    K 4.0 12/10/2022     12/10/2022    CO2 24 12/10/2022    BUN 68 (H) 12/10/2022    CREATININE 3.1 (H) 12/10/2022    CALCIUM 8.7 12/10/2022    ANIONGAP 13 12/10/2022    EGFRNORACEVR 21 (A) 12/10/2022     Lab Results   Component Value Date    WBC 3.63 (L) 12/10/2022    HGB 8.3 (L) 12/10/2022    HCT 27.8 (L) 12/10/2022    MCV 85 12/10/2022     12/10/2022     CMV DNA quant,     Significant Imaging: reviewed

## 2022-12-11 ENCOUNTER — NURSE TRIAGE (OUTPATIENT)
Dept: ADMINISTRATIVE | Facility: CLINIC | Age: 67
End: 2022-12-11
Payer: COMMERCIAL

## 2022-12-11 NOTE — TELEPHONE ENCOUNTER
"Mitch Leon's daughter states Mitch is a post kidney transplant pt, dc'd home yesterday and was prescribed valganciclovir (Valcyte). Pt unable to find medication Valcyte at any pharmacy and was instructed to start medication today. Edward states some pharmacies told the pt they could possibly order medication and get in tomorrow.Advised per triage protocol on provider will be contacted informed caller of brief hold. V/u.     Per Dr. Lindsey Ferreira, prescribing physician (hospitalist) written advice via secure chat "tomorrow will be fine."     Updated Edward per Dr. Ferreira advice and instructed caller to bring rx back to pharmacy that she was told could order the medication today and have it in stock for tomorrow.V/u.   Reason for Disposition   [1] Caller has URGENT medicine question about med that PCP or specialist prescribed AND [2] triager unable to answer question    Protocols used: Medication Question Call-A-AH    "

## 2022-12-12 ENCOUNTER — PATIENT OUTREACH (OUTPATIENT)
Dept: ADMINISTRATIVE | Facility: CLINIC | Age: 67
End: 2022-12-12
Payer: COMMERCIAL
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
## 2022-12-12 LAB
BACTERIA STL CULT: NORMAL
CMV DNA SPEC QL NAA+PROBE: ABNORMAL
CMV DNA SPEC QL NAA+PROBE: NOT DETECTED
CMV IGM SERPL IA-ACNC: <8 AU/ML
CYTOMEGALOVIRUS LOG (IU/ML): 2.03 LOGIU/ML
CYTOMEGALOVIRUS PCR, QUANT: 108 IU/ML
FAT STL QL: NORMAL
NEUTRAL FAT STL QL: NORMAL
SPECIMEN SOURCE: NORMAL

## 2022-12-12 NOTE — PROGRESS NOTES
C3 nurse spoke with Mitch Whittaker for a TCC post hospital discharge follow up call. The patient does not have a scheduled HOSFU appointment with Alix Kowalski,  within 5-7 days post hospital discharge date 12/10/22. C3 nurse was unable to schedule HOSFU appointment in [facility].    Message sent to PCP staff requesting they contact patient and schedule follow up appointment. Referral to home NP placed.

## 2022-12-13 ENCOUNTER — PES CALL (OUTPATIENT)
Dept: ADMINISTRATIVE | Facility: CLINIC | Age: 67
End: 2022-12-13
Payer: COMMERCIAL

## 2022-12-13 LAB
ELASTASE 1, FECAL: >500 MCG/G
O+P STL MICRO: NORMAL

## 2022-12-14 LAB — CALPROTECTIN STL-MCNT: <27.1 MCG/G

## 2022-12-16 ENCOUNTER — LAB VISIT (OUTPATIENT)
Dept: LAB | Facility: HOSPITAL | Age: 67
End: 2022-12-16
Attending: INTERNAL MEDICINE
Payer: COMMERCIAL

## 2022-12-16 DIAGNOSIS — B25.1 CYTOMEGALIC INCLUSION VIRUS HEPATITIS: ICD-10-CM

## 2022-12-16 DIAGNOSIS — D64.9 ANEMIA, UNSPECIFIED: Primary | ICD-10-CM

## 2022-12-16 LAB
ALBUMIN SERPL BCP-MCNC: 3 G/DL (ref 3.5–5.2)
ALP SERPL-CCNC: 50 U/L (ref 55–135)
ALT SERPL W/O P-5'-P-CCNC: 11 U/L (ref 10–44)
ANION GAP SERPL CALC-SCNC: 14 MMOL/L (ref 8–16)
AST SERPL-CCNC: 13 U/L (ref 10–40)
BASOPHILS NFR BLD: 0 % (ref 0–1.9)
BILIRUB SERPL-MCNC: 0.4 MG/DL (ref 0.1–1)
BUN SERPL-MCNC: 57 MG/DL (ref 8–23)
CALCIUM SERPL-MCNC: 8.9 MG/DL (ref 8.7–10.5)
CHLORIDE SERPL-SCNC: 98 MMOL/L (ref 95–110)
CO2 SERPL-SCNC: 29 MMOL/L (ref 23–29)
CREAT SERPL-MCNC: 3 MG/DL (ref 0.5–1.4)
DIFFERENTIAL METHOD: ABNORMAL
EOSINOPHIL NFR BLD: 0 % (ref 0–8)
ERYTHROCYTE [DISTWIDTH] IN BLOOD BY AUTOMATED COUNT: 13.2 % (ref 11.5–14.5)
EST. GFR  (NO RACE VARIABLE): 22 ML/MIN/1.73 M^2
GLUCOSE SERPL-MCNC: 167 MG/DL (ref 70–110)
HCT VFR BLD AUTO: 27.9 % (ref 40–54)
HGB BLD-MCNC: 8 G/DL (ref 14–18)
HYPOCHROMIA BLD QL SMEAR: ABNORMAL
IMM GRANULOCYTES # BLD AUTO: ABNORMAL K/UL (ref 0–0.04)
IMM GRANULOCYTES NFR BLD AUTO: ABNORMAL % (ref 0–0.5)
LYMPHOCYTES NFR BLD: 17 % (ref 18–48)
MCH RBC QN AUTO: 25.3 PG (ref 27–31)
MCHC RBC AUTO-ENTMCNC: 28.7 G/DL (ref 32–36)
MCV RBC AUTO: 88 FL (ref 82–98)
METAMYELOCYTES NFR BLD MANUAL: 1 %
MONOCYTES NFR BLD: 4 % (ref 4–15)
NEUTROPHILS NFR BLD: 78 % (ref 38–73)
NRBC BLD-RTO: 0 /100 WBC
OVALOCYTES BLD QL SMEAR: ABNORMAL
PLATELET # BLD AUTO: 231 K/UL (ref 150–450)
PLATELET BLD QL SMEAR: ABNORMAL
PMV BLD AUTO: 10.7 FL (ref 9.2–12.9)
POTASSIUM SERPL-SCNC: 4.3 MMOL/L (ref 3.5–5.1)
PROT SERPL-MCNC: 5.7 G/DL (ref 6–8.4)
RBC # BLD AUTO: 3.16 M/UL (ref 4.6–6.2)
SODIUM SERPL-SCNC: 141 MMOL/L (ref 136–145)
TARGETS BLD QL SMEAR: ABNORMAL
WBC # BLD AUTO: 3.94 K/UL (ref 3.9–12.7)

## 2022-12-16 PROCEDURE — 36415 COLL VENOUS BLD VENIPUNCTURE: CPT | Performed by: INTERNAL MEDICINE

## 2022-12-16 PROCEDURE — 85027 COMPLETE CBC AUTOMATED: CPT | Performed by: INTERNAL MEDICINE

## 2022-12-16 PROCEDURE — 85007 BL SMEAR W/DIFF WBC COUNT: CPT | Performed by: INTERNAL MEDICINE

## 2022-12-16 PROCEDURE — 80053 COMPREHEN METABOLIC PANEL: CPT | Performed by: INTERNAL MEDICINE

## 2022-12-19 ENCOUNTER — TELEPHONE (OUTPATIENT)
Dept: INTERNAL MEDICINE | Facility: CLINIC | Age: 67
End: 2022-12-19
Payer: COMMERCIAL

## 2022-12-19 ENCOUNTER — CARE AT HOME (OUTPATIENT)
Dept: HOME HEALTH SERVICES | Facility: CLINIC | Age: 67
End: 2022-12-19
Payer: COMMERCIAL

## 2022-12-19 ENCOUNTER — PATIENT OUTREACH (OUTPATIENT)
Dept: ADMINISTRATIVE | Facility: HOSPITAL | Age: 67
End: 2022-12-19
Payer: COMMERCIAL

## 2022-12-19 VITALS
SYSTOLIC BLOOD PRESSURE: 96 MMHG | RESPIRATION RATE: 18 BRPM | DIASTOLIC BLOOD PRESSURE: 70 MMHG | OXYGEN SATURATION: 99 % | HEART RATE: 78 BPM

## 2022-12-19 DIAGNOSIS — N17.8 ACUTE RENAL FAILURE WITH OTHER SPECIFIED PATHOLOGICAL KIDNEY LESION SUPERIMPOSED ON STAGE 4 CHRONIC KIDNEY DISEASE: ICD-10-CM

## 2022-12-19 DIAGNOSIS — N18.4 ACUTE RENAL FAILURE WITH OTHER SPECIFIED PATHOLOGICAL KIDNEY LESION SUPERIMPOSED ON STAGE 4 CHRONIC KIDNEY DISEASE: ICD-10-CM

## 2022-12-19 DIAGNOSIS — R55 SYNCOPE AND COLLAPSE: ICD-10-CM

## 2022-12-19 DIAGNOSIS — B25.1 CYTOMEGALIC INCLUSION VIRUS HEPATITIS: Primary | ICD-10-CM

## 2022-12-19 LAB
CMV DNA SPEC QL NAA+PROBE: DETECTED
CYTOMEGALOVIRUS LOG (IU/ML): <1.7 LOGIU/ML
CYTOMEGALOVIRUS PCR, QUANT: <50 IU/ML

## 2022-12-19 PROCEDURE — 99495 TCM SERVICES (MODERATE COMPLEXITY): ICD-10-PCS | Mod: S$GLB,,,

## 2022-12-19 PROCEDURE — 99495 TRANSJ CARE MGMT MOD F2F 14D: CPT | Mod: S$GLB,,,

## 2022-12-19 NOTE — TELEPHONE ENCOUNTER
----- Message from Debby Lainez sent at 12/19/2022 11:44 AM CST -----  Contact: Mitch Mejia is calling in regards to orders for covid test. Pt stated that he was exposed. Please call him back at [phone].          Thanks  DD

## 2022-12-19 NOTE — PROGRESS NOTES
[facility]  Transition of Care Home Visit    Visit Date: 12/19/2022  Encounter Provider: Javy Su   PCP:  Alix Kowalski DO    PRESENTING HISTORY      Patient ID: Mitch Whittaker is a 67 y.o. male.    Consult Requested By:  Dr. Lindsey Ferreira  Reason for Consult:  Hospital Follow Up.    Mitch is being seen at home due to being seen at home due to physical debility that presents a taxing effort to leave the home, to mitigate high risk of hospital readmission and/or due to the limited availability of reliable or safe options for transportation to the point of access to the provider. Prior to treatment on this visit the chart was reviewed and patient verbal consent was obtained.      Chief Complaint: Transitional Care        History of Present Illness: Mr. Mitch Whittaker is a 67 y.o. male who was recently admitted to the hospital.    12/8-12/10 admission with a past medical history of CHF, HTN, diabetes type 2 who presents to the Emergency Department for evaluation of fall which onset last night. Pt was sent by Dr Caal for a CT scan after he had a syncopal episode last night. Pt is also on Eliquis. Symptoms are constant and moderate in severity. No mitigating or exacerbating factors reported. Associated sxs include blood in stool, cough, dizziness. Patient denies any CP, dysuria, SOB, n/v, fever, numbness, and all other sxs at this time. No further complaints or concerns at this time.  ___________________________________________________________________    Today:    HPI:  Patient is being seen today for a tcc visit following a hospital encounter 12/8-12/10 for evaluation of a fall.  The patient had a syncopal episode the night before the hospital visit with some dizziness and he reports hitting his head.  Reports his blood sugar dropped which caused him to fall. Also, reports worsening dizziness prior to fall.  Upon visit today patient is ambulating in his home, no acute distress, and VSS.  He is AAOx3.   His wife is present on visit.  He reports improvement in symptoms in which prompted his hospital stay.  He reports his blood sugar was 81 this morning.  He does report having some low blood pressure readings.  Blood pressure on visit was 96/70.  He reports some lightheadedness when getting up to fast from a sitting position.  Instructed him to sit and dangle his legs for a few minutes before getting up.  He reports compliance with all medications. He has no complaints at this time.  Questions elicited. Time allowed for questions, all issues addressed. Contact info given for any concerns or changes.  He has a follow up with primary MD on 12/21.              Review of Systems   Constitutional: Negative.    HENT: Negative.     Eyes: Negative.    Respiratory: Negative.     Cardiovascular:  Positive for leg swelling.   Gastrointestinal: Negative.    Endocrine: Negative.    Genitourinary: Negative.    Musculoskeletal: Negative.    Skin: Negative.    Allergic/Immunologic: Negative.    Neurological:  Positive for dizziness, weakness and light-headedness (when raising from sitting position).   Hematological: Negative.    Psychiatric/Behavioral: Negative.       Assessments:  Environmental: Patient lives in a single story home.  Adequate lighting and comfortable temperature.   Functional Status: Independent with ADL's. Uses cane occasionally to ambulate.   Safety: Fall precautions.   Nutritional: Adequate food in the home.   Home Health/DME/Supplies: [facility]. DME: cane    PAST HISTORY:     Past Medical History:   Diagnosis Date    Acquired renal cyst of left kidney     Anemia associated with chronic renal failure     CAD (coronary artery disease)     nonobstructive [facility] 9/14    CHF (congestive heart failure)     Chronic immunosuppression with Prograf and MMF 06/18/2015    Chronic venous insufficiency of lower extremity     CKD (chronic kidney disease) stage 3, GFR 30-59 ml/min     Diabetic retinopathy     DM  (diabetes mellitus), type 2 with complications     Edema     End stage kidney disease     s/p transplant, doing well    Gallbladder polyp     Heart failure, diastolic, due to HTN     Hemodialysis status     off since transplant    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 RNA NEGATIVE 2021    History of colon polyps     HPTH (hyperparathyroidism)     Hyperlipidemia     Hypertension associated with stage 3 chronic kidney disease due to type 2 diabetes mellitus     LBBB (left bundle branch block) 2021    Morbid obesity with BMI of 45.0-49.9, adult     PCO (posterior capsular opacification), left 2019    Proteinuria     resolved s/p transplant    S/P kidney transplant     Sleep apnea     Type 2 diabetes, uncontrolled, with retinopathy     Type II diabetes mellitus with renal manifestations        Past Surgical History:   Procedure Laterality Date    CARDIAC CATHETERIZATION      normal coronary    COLONOSCOPY N/A 2018    Procedure: COLONOSCOPY;  Surgeon: Chava Ronquillo MD;  Location: [facility] ENDO;  Service: Endoscopy;  Laterality: N/A;    COLONOSCOPY N/A 2022    Procedure: COLONOSCOPY;  Surgeon: Alix Puente MD;  Location: [facility] ENDO;  Service: Endoscopy;  Laterality: N/A;    KIDNEY TRANSPLANT      RETINAL LASER PROCEDURE         Family History   Problem Relation Age of Onset    Diabetes Mother     Hypertension Mother     Heart failure Mother     Kidney disease Sister         ESRD    Diabetes Sister     Diabetes Maternal Grandmother     Cancer Neg Hx        Social History     Socioeconomic History    Marital status:     Number of children: 2   Occupational History    Occupation: retired     Employer: Retired   Tobacco Use    Smoking status: Former     Types: Cigarettes     Quit date: 2013     Years since quittin.5    Smokeless tobacco: Former     Quit date: 2013    Tobacco comments:     used marijuana since 2654-3031, stopped  "after started dialysis   Substance and Sexual Activity    Alcohol use: No     Alcohol/week: 0.0 standard drinks    Drug use: No     Comment:      Sexual activity: Never   Social History Narrative    . Lives with spouse. Has 2 children. Patient retired as  for [employer]. He has been washing cars.       MEDICATIONS & ALLERGIES:     Current Outpatient Medications on File Prior to Visit   Medication Sig Dispense Refill    apixaban (ELIQUIS) 5 mg Tab Take 1 tablet (5 mg total) by mouth 2 (two) times daily. 60 tablet 6    aspirin (ECOTRIN) 81 MG EC tablet Take 1 tablet by mouth Daily.       BD ULTRA-FINE MINI PEN NEEDLE 31 gauge x 3/16" Ndle Use to inject insulin as needed up to 4 times daily (Patient not taking: Reported on 12/12/2022) 100 each 3    blood sugar diagnostic Strp Use to test four times per day (Patient not taking: Reported on 12/12/2022) 150 strip 11    blood sugar diagnostic Strp 100 each by Misc.(Non-Drug; Combo Route) route 4 (four) times daily before meals and nightly. (Patient not taking: Reported on 12/12/2022) 100 each 1    blood-glucose meter (FREESTYLE LITE METER) kit 1 each 4 (four) times daily. 1 each 0    calcitRIOL (ROCALTROL) 0.25 MCG Cap Take 1 capsule (0.25 mcg total) by mouth once daily. 30 capsule 11    famotidine (PEPCID) 20 MG tablet TAKE ONE TABLET BY MOUTH EVERY EVENING 30 tablet 11    fish oil-omega-3 fatty acids 300-1,000 mg capsule Take 1 g by mouth once daily.      furosemide (LASIX) 80 MG tablet Take one-half tablet (40 mg) by mouth 2 (two) times daily. 30 tablet 11    glimepiride (AMARYL) 2 MG tablet Take 1 tablet (2 mg total) by mouth before breakfast. 90 tablet 3    insulin (LANTUS SOLOSTAR U-100 INSULIN) glargine 100 units/mL SubQ pen Inject 35 Units into the skin 2 (two) times daily. 30 mL 0    insulin aspart U-100 (NOVOLOG FLEXPEN U-100 INSULIN) 100 unit/mL (3 mL) InPn pen Inject 25 Units into the skin 3 (three) times daily with meals. 30 mL 0    " "ketoconazole (NIZORAL) 200 mg Tab Take HALF A tablet (100 mg total) by mouth once daily. 15 tablet 9    lancets Misc Use to check BG 4 (four) times daily before meals and nightly. (Patient not taking: Reported on 12/12/2022) 100 each 11    magnesium oxide (MAG-OX) 400 mg (241.3 mg magnesium) tablet Take 1 tablet (400 mg total) by mouth once daily. 90 tablet 1    metOLazone (ZAROXOLYN) 2.5 MG tablet Take 1 tablet by mouth on Monday and Friday as directed 30 tablet 11    metoprolol succinate (TOPROL-XL) 25 MG 24 hr tablet Take 1 tablet (25 mg total) by mouth once daily. 30 tablet 11    montelukast (SINGULAIR) 10 mg tablet Take 1 tablet (10 mg total) by mouth every evening. 30 tablet 1    multivitamin (THERAGRAN) tablet Take 1 tablet by mouth once daily.      mupirocin (BACTROBAN) 2 % ointment Apply topically 3 (three) times daily. Use as directed on the leg wound. 22 g 1    mycophenolate (CELLCEPT) 250 mg Cap Take 3 capsules (750 mg total) by mouth 2 (two) times daily. 180 capsule 11    ondansetron (ZOFRAN) 4 MG tablet Take 1 tablet (4 mg total) by mouth every 6 (six) hours. (Patient not taking: Reported on 12/12/2022) 30 tablet 0    pen needle, diabetic (BD INSULIN PEN NEEDLE UF SHORT) 31 gauge x 5/16" Ndle USE TO INJECT INSULIN TWICE A DAY (Patient not taking: Reported on 12/12/2022) 200 each 5    potassium chloride SA (K-DUR,KLOR-CON) 20 MEQ tablet Take 2 tablets (40 mEq total) by mouth once daily. 60 tablet 3    predniSONE (DELTASONE) 5 MG tablet Take 1 tablet (5 mg total) by mouth once daily. 30 tablet 11    promethazine-dextromethorphan (PROMETHAZINE-DM) 6.25-15 mg/5 mL Syrp Take by mouth.      rosuvastatin (CRESTOR) 40 MG Tab Take 1 tablet (40 mg total) by mouth once daily in the evening. 90 tablet 3    sacubitriL-valsartan (ENTRESTO)  mg per tablet Take 1 tablet by mouth 2 (two) times daily. 60 tablet 3    tacrolimus (PROGRAF) 1 MG Cap Take 4 capsules (4 mg total) by mouth every 12 (twelve) hours. 240 " capsule 11    tamsulosin (FLOMAX) 0.4 mg Cap Take 1 capsule (0.4 mg total) by mouth once daily. 30 capsule 11    triamcinolone acetonide 0.1% (KENALOG) 0.1 % ointment Apply topically 2 (two) times daily. Use on bilateral lower legs. 454 g 1    valGANciclovir (VALCYTE) 450 mg Tab Take 1 tablet (450 mg total) by mouth once daily. for 21 days 21 tablet 0     Current Facility-Administered Medications on File Prior to Visit   Medication Dose Route Frequency Provider Last Rate Last Admin    acetaminophen tablet 650 mg  650 mg Oral Once PRN Sal Lopez MD            Review of patient's allergies indicates:   Allergen Reactions    Lisinopril Other (See Comments)     Other reaction(s):  cough    Actos  [pioglitazone] Other (See Comments)     Other reaction(s): CHF    Metformin Other (See Comments)     Other reaction(s): renal insuff  Other reaction(s): CHF       OBJECTIVE:     Vital Signs:  Vitals:    12/19/22 1348   BP: 96/70   Pulse: 78   Resp: 18     There is no height or weight on file to calculate BMI.     Physical Exam:  Physical Exam  Constitutional:       Appearance: Normal appearance. He is obese.   HENT:      Nose: Nose normal.      Mouth/Throat:      Mouth: Mucous membranes are moist.      Pharynx: Oropharynx is clear.   Eyes:      Pupils: Pupils are equal, round, and reactive to light.   Cardiovascular:      Rate and Rhythm: Normal rate and regular rhythm.      Pulses: Normal pulses.      Heart sounds: Normal heart sounds.   Pulmonary:      Effort: Pulmonary effort is normal.      Breath sounds: Normal breath sounds.   Abdominal:      General: Bowel sounds are normal. There is distension.      Palpations: Abdomen is soft.   Musculoskeletal:         General: Normal range of motion.      Cervical back: Normal range of motion.      Right lower leg: Edema present.      Left lower leg: Edema present.   Skin:     General: Skin is warm and dry.   Neurological:      General: No focal deficit present.      Mental  Status: He is alert and oriented to person, place, and time. Mental status is at baseline.      Motor: Weakness present.   Psychiatric:         Mood and Affect: Mood normal.         Behavior: Behavior normal.         Thought Content: Thought content normal.         Judgment: Judgment normal.       Laboratory  Lab Results   Component Value Date    WBC 3.94 12/16/2022    HGB 8.0 (L) 12/16/2022    HCT 27.9 (L) 12/16/2022    MCV 88 12/16/2022     12/16/2022     Lab Results   Component Value Date    INR 1.0 06/18/2015    INR 1.0 06/12/2015    INR 0.9 05/25/2015     Lab Results   Component Value Date    HGBA1C 7.1 (H) 12/09/2022     No results for input(s): POCTGLUCOSE in the last 72 hours.    Diagnostic Results:      TRANSITION OF CARE:     [facility] Contact Note: 12/13    Family and/or Caretaker present at visit?  Yes.  Diagnostic tests reviewed/disposition: No diagnosic tests pending after this hospitalization.  Disease/illness education: Take all medication as prescribed. Activity as tolerated.  Keep all upcoming appts   Home health/community services discussion/referrals: Patient has home health established at [facility] .   Establishment or re-establishment of referral orders for community resources: No other necessary community resources.   Discussion with other health care providers: No discussion with other health care providers necessary.     Transition of Care Visit:  I have reviewed and updated the history and problem list.  I have reconciled the medication list.  I have discussed the hospitalization and current medical issues, prognosis and plans with the patient/family.  I  spent more than 50% of time discussing the care with the patient/family.  Total Face-to-Face Encounter: 60 minutes.    Medications Reconciliation:   I have reconciled the patient's home medications and discharge medications with the patient/family. I have updated all changes.  Refer to After-Visit Medication  List.    ASSESSMENT & PLAN:     HIGH RISK CONDITION(S):      Problem List Items Addressed This Visit          Renal/    Acute on chronic renal failure    Current Assessment & Plan     Recent hospital encounter  Recent eGFR 22, BUN 57, Cr 3  Maintain adequate hydration  Kidney transplant 2015  Nephrology f/u 2/23  Monitor                GI    Cytomegalic inclusion virus hepatitis - Primary    Current Assessment & Plan     Recent CMV pcr 108  Currently on Valcyte, 21 day therapy  Monitor            Other    Syncope and collapse    Current Assessment & Plan     Recent hospital encounter  Episode of dizziness and weakness 1 day prior to hospital encounter with fall  Reports improvement since hospital visit.  Continue home health and PT  Fall precautions  Monitor               Were controlled substances prescribed?  No    Instructions for the patient:    - Continue all medications, treatments and therapies as ordered.   - Follow all instructions, recommendations as discussed.  - Maintain Safety Precautions at all times.  - Attend all medical appointments as scheduled.  - For worsening symptoms: call Primary Care Physician or Nurse Practitioner.  - For emergencies, call 911 or immediately report to the nearest emergency room.  - Limit Risks of environmental exposure to coronavirus/COVID-19 as discussed including: social distancing, hand hygiene, and limiting departures from the home for necessities only.     Scheduled Follow-up :  Future Appointments   Date Time Provider Department Center   12/21/2022 10:40 AM Alix Kowalski DO [facility]   1/3/2023  8:00 AM Maurisio Sweeney MD [facility] DERM  [place]   1/3/2023  9:20 AM LABORATORY, [facility]   1/4/2023 10:20 AM Aislinn Hdz PA-C [facility] PULMSVC [facility]   1/9/2023  8:00 AM Chris Cabral DPM [facility] POD [facility] [place]   1/17/2023  2:00 PM [facility] XR1-DR [facility]   1/17/2023  2:45 PM Noel Almonte MD [facility] ORTHO [facility]    2/1/2023  1:30 PM Hany Gonsalez MD [facility] NEPHRO [facility]   2/15/2023 10:40 AM Micah Muhammad MD [facility]       After Visit Medication List :     Medication List            Accurate as of December 19, 2022  2:11 PM. If you have any questions, ask your nurse or doctor.                CONTINUE taking these medications      aspirin 81 MG EC tablet  Commonly known as: ECOTRIN     * blood sugar diagnostic Strp  Use to test four times per day     * blood sugar diagnostic Strp  100 each by Misc.(Non-Drug; Combo Route) route 4 (four) times daily before meals and nightly.     blood-glucose meter kit  Commonly known as: FREESTYLE LITE METER  1 each 4 (four) times daily.     calcitRIOL 0.25 MCG Cap  Commonly known as: ROCALTROL  Take 1 capsule (0.25 mcg total) by mouth once daily.     ELIQUIS 5 mg Tab  Generic drug: apixaban  Take 1 tablet (5 mg total) by mouth 2 (two) times daily.     ENTRESTO  mg per tablet  Generic drug: sacubitriL-valsartan  Take 1 tablet by mouth 2 (two) times daily.     famotidine 20 MG tablet  Commonly known as: PEPCID  TAKE ONE TABLET BY MOUTH EVERY EVENING     fish oil-omega-3 fatty acids 300-1,000 mg capsule     furosemide 80 MG tablet  Commonly known as: LASIX  Take one-half tablet (40 mg) by mouth 2 (two) times daily.     glimepiride 2 MG tablet  Commonly known as: AMARYL  Take 1 tablet (2 mg total) by mouth before breakfast.     insulin aspart U-100 100 unit/mL (3 mL) Inpn pen  Commonly known as: NovoLOG Flexpen U-100 Insulin  Inject 25 Units into the skin 3 (three) times daily with meals.     insulin glargine 100 units/mL SubQ pen  Commonly known as: LANTUS SOLOSTAR U-100 INSULIN  Inject 35 Units into the skin 2 (two) times daily.     ketoconazole 200 mg Tab  Commonly known as: NIZORAL  Take HALF A tablet (100 mg total) by mouth once daily.     lancets Misc  Use to check BG 4 (four) times daily before meals and nightly.     magnesium oxide 400 mg (241.3 mg magnesium)  "tablet  Commonly known as: MAG-OX  Take 1 tablet (400 mg total) by mouth once daily.     metOLazone 2.5 MG tablet  Commonly known as: ZAROXOLYN  Take 1 tablet by mouth on Monday and Friday as directed     metoprolol succinate 25 MG 24 hr tablet  Commonly known as: TOPROL-XL  Take 1 tablet (25 mg total) by mouth once daily.     montelukast 10 mg tablet  Commonly known as: SINGULAIR  Take 1 tablet (10 mg total) by mouth every evening.     multivitamin tablet  Commonly known as: THERAGRAN     mupirocin 2 % ointment  Commonly known as: BACTROBAN  Apply topically 3 (three) times daily. Use as directed on the leg wound.     mycophenolate 250 mg Cap  Commonly known as: CELLCEPT  Take 3 capsules (750 mg total) by mouth 2 (two) times daily.     ondansetron 4 MG tablet  Commonly known as: ZOFRAN  Take 1 tablet (4 mg total) by mouth every 6 (six) hours.     * pen needle, diabetic 31 gauge x 5/16" Ndle  Commonly known as: BD ULTRA-FINE SHORT PEN NEEDLE  USE TO INJECT INSULIN TWICE A DAY     * BD ULTRA-FINE MINI PEN NEEDLE 31 gauge x 3/16" Ndle  Generic drug: pen needle, diabetic  Use to inject insulin as needed up to 4 times daily     potassium chloride SA 20 MEQ tablet  Commonly known as: K-DUR,KLOR-CON  Take 2 tablets (40 mEq total) by mouth once daily.     predniSONE 5 MG tablet  Commonly known as: DELTASONE  Take 1 tablet (5 mg total) by mouth once daily.     promethazine-dextromethorphan 6.25-15 mg/5 mL Syrp  Commonly known as: PROMETHAZINE-DM     rosuvastatin 40 MG Tab  Commonly known as: CRESTOR  Take 1 tablet (40 mg total) by mouth once daily in the evening.     tacrolimus 1 MG Cap  Commonly known as: PROGRAF  Take 4 capsules (4 mg total) by mouth every 12 (twelve) hours.     tamsulosin 0.4 mg Cap  Commonly known as: FLOMAX  Take 1 capsule (0.4 mg total) by mouth once daily.     triamcinolone acetonide 0.1% 0.1 % ointment  Commonly known as: KENALOG  Apply topically 2 (two) times daily. Use on bilateral lower legs.   "   valGANciclovir 450 mg Tab  Commonly known as: VALCYTE  Take 1 tablet (450 mg total) by mouth once daily. for 21 days           * This list has 4 medication(s) that are the same as other medications prescribed for you. Read the directions carefully, and ask your doctor or other care provider to review them with you.                  Signature: Javy Su NP

## 2022-12-19 NOTE — PROGRESS NOTES
Called patient to confirm hospital follow up scheduled on 1219/2022.   Patient is not confirmed. Pt said he has been exposed to covid and  is waiting on call from Dr Kowalski office. He declined a virtual visit.

## 2022-12-19 NOTE — ASSESSMENT & PLAN NOTE
Recent hospital encounter  Episode of dizziness and weakness 1 day prior to hospital encounter with fall  Reports improvement since hospital visit.  Continue home health and PT  Fall precautions  Monitor

## 2022-12-19 NOTE — ASSESSMENT & PLAN NOTE
Recent hospital encounter  Recent eGFR 22, BUN 57, Cr 3  Maintain adequate hydration  Kidney transplant 2015  Nephrology f/u 2/23  Monitor

## 2022-12-20 NOTE — PROGRESS NOTES
Called to confirm appt 12/21/22. States he does not know yet if he is positive for covid. States he is going to get tested this pm.   Assisted to schedule hosp follow up for 12/28/22, in case he is positive. Said he will call back this afternoon to confirm appt.

## 2022-12-21 ENCOUNTER — LAB VISIT (OUTPATIENT)
Dept: LAB | Facility: HOSPITAL | Age: 67
End: 2022-12-21
Attending: FAMILY MEDICINE
Payer: COMMERCIAL

## 2022-12-21 DIAGNOSIS — D64.9 ANEMIA, UNSPECIFIED: Primary | ICD-10-CM

## 2022-12-21 DIAGNOSIS — I95.89 CHRONIC HYPOTENSION: ICD-10-CM

## 2022-12-21 DIAGNOSIS — B25.1 CYTOMEGALIC INCLUSION VIRUS HEPATITIS: ICD-10-CM

## 2022-12-21 DIAGNOSIS — I87.2 PERIPHERAL VENOUS INSUFFICIENCY: ICD-10-CM

## 2022-12-21 DIAGNOSIS — I25.118 ATHSCL HEART DISEASE OF NATIVE COR ART W OTH ANG PCTRS: ICD-10-CM

## 2022-12-21 PROCEDURE — 85027 COMPLETE CBC AUTOMATED: CPT | Performed by: FAMILY MEDICINE

## 2022-12-21 PROCEDURE — 36415 COLL VENOUS BLD VENIPUNCTURE: CPT | Mod: PN | Performed by: FAMILY MEDICINE

## 2022-12-21 PROCEDURE — 86645 CMV ANTIBODY IGM: CPT | Performed by: FAMILY MEDICINE

## 2022-12-21 PROCEDURE — 80053 COMPREHEN METABOLIC PANEL: CPT | Performed by: FAMILY MEDICINE

## 2022-12-22 ENCOUNTER — TELEPHONE (OUTPATIENT)
Dept: HEPATOLOGY | Facility: HOSPITAL | Age: 67
End: 2022-12-22
Payer: COMMERCIAL

## 2022-12-22 ENCOUNTER — TELEPHONE (OUTPATIENT)
Dept: INTERNAL MEDICINE | Facility: CLINIC | Age: 67
End: 2022-12-22
Payer: COMMERCIAL

## 2022-12-22 LAB
ALBUMIN SERPL BCP-MCNC: 3.2 G/DL (ref 3.5–5.2)
ALP SERPL-CCNC: 49 U/L (ref 55–135)
ALT SERPL W/O P-5'-P-CCNC: 9 U/L (ref 10–44)
ANION GAP SERPL CALC-SCNC: 13 MMOL/L (ref 8–16)
AST SERPL-CCNC: 17 U/L (ref 10–40)
BILIRUB SERPL-MCNC: 0.4 MG/DL (ref 0.1–1)
BUN SERPL-MCNC: 49 MG/DL (ref 8–23)
CALCIUM SERPL-MCNC: 9.2 MG/DL (ref 8.7–10.5)
CHLORIDE SERPL-SCNC: 102 MMOL/L (ref 95–110)
CO2 SERPL-SCNC: 29 MMOL/L (ref 23–29)
CREAT SERPL-MCNC: 2.6 MG/DL (ref 0.5–1.4)
ERYTHROCYTE [DISTWIDTH] IN BLOOD BY AUTOMATED COUNT: 13.5 % (ref 11.5–14.5)
EST. GFR  (NO RACE VARIABLE): 25.9 ML/MIN/1.73 M^2
GLUCOSE SERPL-MCNC: 40 MG/DL (ref 70–110)
HCT VFR BLD AUTO: 29.5 % (ref 40–54)
HGB BLD-MCNC: 8.4 G/DL (ref 14–18)
MCH RBC QN AUTO: 25.7 PG (ref 27–31)
MCHC RBC AUTO-ENTMCNC: 28.5 G/DL (ref 32–36)
MCV RBC AUTO: 90 FL (ref 82–98)
PLATELET # BLD AUTO: 262 K/UL (ref 150–450)
PMV BLD AUTO: 11.2 FL (ref 9.2–12.9)
POTASSIUM SERPL-SCNC: 3.7 MMOL/L (ref 3.5–5.1)
PROT SERPL-MCNC: 6.3 G/DL (ref 6–8.4)
RBC # BLD AUTO: 3.27 M/UL (ref 4.6–6.2)
SODIUM SERPL-SCNC: 144 MMOL/L (ref 136–145)
WBC # BLD AUTO: 3.45 K/UL (ref 3.9–12.7)

## 2022-12-22 NOTE — TELEPHONE ENCOUNTER
Received critical lab value   Blood glucose 40    Contacted patient  States blood sugars running low in morning time  This morning glucose 79  Does not take morning novolog if blood sugar is low  Endorses compliance to long acting lantus  Also taking amaryl  Requesting diabetic test strip refill    Advised patient to continue checking blood sugar  Discontinue taking amaryl  Do not administer novolog in morning if hypoglycemic  Continue long acting insulin bid 30u lantus  Blood sugar testing strips refilled

## 2022-12-28 ENCOUNTER — LAB VISIT (OUTPATIENT)
Dept: LAB | Facility: HOSPITAL | Age: 67
End: 2022-12-28
Attending: FAMILY MEDICINE
Payer: COMMERCIAL

## 2022-12-28 ENCOUNTER — OFFICE VISIT (OUTPATIENT)
Dept: INTERNAL MEDICINE | Facility: CLINIC | Age: 67
End: 2022-12-28
Payer: COMMERCIAL

## 2022-12-28 VITALS
BODY MASS INDEX: 42.87 KG/M2 | DIASTOLIC BLOOD PRESSURE: 50 MMHG | HEART RATE: 103 BPM | TEMPERATURE: 98 F | RESPIRATION RATE: 20 BRPM | HEIGHT: 67 IN | SYSTOLIC BLOOD PRESSURE: 98 MMHG | WEIGHT: 273.13 LBS | OXYGEN SATURATION: 99 %

## 2022-12-28 DIAGNOSIS — Z94.0 KIDNEY TRANSPLANTED: ICD-10-CM

## 2022-12-28 DIAGNOSIS — N18.30 CHRONIC KIDNEY DISEASE, STAGE III (MODERATE): ICD-10-CM

## 2022-12-28 DIAGNOSIS — E11.65 UNCONTROLLED TYPE 2 DIABETES MELLITUS WITH HYPERGLYCEMIA, WITH LONG-TERM CURRENT USE OF INSULIN: ICD-10-CM

## 2022-12-28 DIAGNOSIS — I13.0 HYPERTENSIVE HEART AND RENAL DISEASE WITH CONGESTIVE HEART FAILURE: Primary | ICD-10-CM

## 2022-12-28 DIAGNOSIS — Z09 HOSPITAL DISCHARGE FOLLOW-UP: Primary | ICD-10-CM

## 2022-12-28 DIAGNOSIS — E11.649 HYPOGLYCEMIA ASSOCIATED WITH DIABETES: ICD-10-CM

## 2022-12-28 DIAGNOSIS — N17.9 ACUTE RENAL FAILURE SUPERIMPOSED ON STAGE 4 CHRONIC KIDNEY DISEASE, UNSPECIFIED ACUTE RENAL FAILURE TYPE: ICD-10-CM

## 2022-12-28 DIAGNOSIS — I50.42 CHRONIC COMBINED SYSTOLIC AND DIASTOLIC HEART FAILURE: ICD-10-CM

## 2022-12-28 DIAGNOSIS — E11.319 DIABETIC RETINAL MICROANEURYSM: ICD-10-CM

## 2022-12-28 DIAGNOSIS — N18.4 ACUTE RENAL FAILURE SUPERIMPOSED ON STAGE 4 CHRONIC KIDNEY DISEASE, UNSPECIFIED ACUTE RENAL FAILURE TYPE: ICD-10-CM

## 2022-12-28 DIAGNOSIS — Z79.4 UNCONTROLLED TYPE 2 DIABETES MELLITUS WITH HYPERGLYCEMIA, WITH LONG-TERM CURRENT USE OF INSULIN: ICD-10-CM

## 2022-12-28 DIAGNOSIS — H35.049 DIABETIC RETINAL MICROANEURYSM: ICD-10-CM

## 2022-12-28 LAB
ALBUMIN SERPL BCP-MCNC: 3.2 G/DL (ref 3.5–5.2)
ALP SERPL-CCNC: 52 U/L (ref 55–135)
ALT SERPL W/O P-5'-P-CCNC: 11 U/L (ref 10–44)
ANION GAP SERPL CALC-SCNC: 9 MMOL/L (ref 8–16)
AST SERPL-CCNC: 14 U/L (ref 10–40)
BILIRUB SERPL-MCNC: 0.4 MG/DL (ref 0.1–1)
BUN SERPL-MCNC: 38 MG/DL (ref 8–23)
CALCIUM SERPL-MCNC: 8.6 MG/DL (ref 8.7–10.5)
CHLORIDE SERPL-SCNC: 101 MMOL/L (ref 95–110)
CO2 SERPL-SCNC: 31 MMOL/L (ref 23–29)
CREAT SERPL-MCNC: 2.4 MG/DL (ref 0.5–1.4)
ERYTHROCYTE [DISTWIDTH] IN BLOOD BY AUTOMATED COUNT: 13.5 % (ref 11.5–14.5)
EST. GFR  (NO RACE VARIABLE): 28.5 ML/MIN/1.73 M^2
GLUCOSE SERPL-MCNC: 229 MG/DL (ref 70–110)
HCT VFR BLD AUTO: 30 % (ref 40–54)
HGB BLD-MCNC: 8.6 G/DL (ref 14–18)
MCH RBC QN AUTO: 25.6 PG (ref 27–31)
MCHC RBC AUTO-ENTMCNC: 28.7 G/DL (ref 32–36)
MCV RBC AUTO: 89 FL (ref 82–98)
PLATELET # BLD AUTO: 188 K/UL (ref 150–450)
PMV BLD AUTO: 12.1 FL (ref 9.2–12.9)
POTASSIUM SERPL-SCNC: 3.9 MMOL/L (ref 3.5–5.1)
PROT SERPL-MCNC: 5.7 G/DL (ref 6–8.4)
RBC # BLD AUTO: 3.36 M/UL (ref 4.6–6.2)
SODIUM SERPL-SCNC: 141 MMOL/L (ref 136–145)
WBC # BLD AUTO: 2.54 K/UL (ref 3.9–12.7)

## 2022-12-28 PROCEDURE — 3074F SYST BP LT 130 MM HG: CPT | Mod: CPTII,S$GLB,, | Performed by: INTERNAL MEDICINE

## 2022-12-28 PROCEDURE — 3074F PR MOST RECENT SYSTOLIC BLOOD PRESSURE < 130 MM HG: ICD-10-PCS | Mod: CPTII,S$GLB,, | Performed by: INTERNAL MEDICINE

## 2022-12-28 PROCEDURE — 3008F BODY MASS INDEX DOCD: CPT | Mod: CPTII,S$GLB,, | Performed by: INTERNAL MEDICINE

## 2022-12-28 PROCEDURE — 1100F PR PT FALLS ASSESS DOC 2+ FALLS/FALL W/INJURY/YR: ICD-10-PCS | Mod: CPTII,S$GLB,, | Performed by: INTERNAL MEDICINE

## 2022-12-28 PROCEDURE — 1159F PR MEDICATION LIST DOCUMENTED IN MEDICAL RECORD: ICD-10-PCS | Mod: CPTII,S$GLB,, | Performed by: INTERNAL MEDICINE

## 2022-12-28 PROCEDURE — 3051F HG A1C>EQUAL 7.0%<8.0%: CPT | Mod: CPTII,S$GLB,, | Performed by: INTERNAL MEDICINE

## 2022-12-28 PROCEDURE — 1111F DSCHRG MED/CURRENT MED MERGE: CPT | Mod: CPTII,S$GLB,, | Performed by: INTERNAL MEDICINE

## 2022-12-28 PROCEDURE — 1100F PTFALLS ASSESS-DOCD GE2>/YR: CPT | Mod: CPTII,S$GLB,, | Performed by: INTERNAL MEDICINE

## 2022-12-28 PROCEDURE — 1160F RVW MEDS BY RX/DR IN RCRD: CPT | Mod: CPTII,S$GLB,, | Performed by: INTERNAL MEDICINE

## 2022-12-28 PROCEDURE — 36415 COLL VENOUS BLD VENIPUNCTURE: CPT | Mod: PN | Performed by: INTERNAL MEDICINE

## 2022-12-28 PROCEDURE — 99214 OFFICE O/P EST MOD 30 MIN: CPT | Mod: S$GLB,,, | Performed by: INTERNAL MEDICINE

## 2022-12-28 PROCEDURE — 3072F PR LOW RISK FOR RETINOPATHY: ICD-10-PCS | Mod: CPTII,S$GLB,, | Performed by: INTERNAL MEDICINE

## 2022-12-28 PROCEDURE — 80053 COMPREHEN METABOLIC PANEL: CPT | Performed by: INTERNAL MEDICINE

## 2022-12-28 PROCEDURE — 4010F ACE/ARB THERAPY RXD/TAKEN: CPT | Mod: CPTII,S$GLB,, | Performed by: INTERNAL MEDICINE

## 2022-12-28 PROCEDURE — 3288F PR FALLS RISK ASSESSMENT DOCUMENTED: ICD-10-PCS | Mod: CPTII,S$GLB,, | Performed by: INTERNAL MEDICINE

## 2022-12-28 PROCEDURE — 3288F FALL RISK ASSESSMENT DOCD: CPT | Mod: CPTII,S$GLB,, | Performed by: INTERNAL MEDICINE

## 2022-12-28 PROCEDURE — 3072F LOW RISK FOR RETINOPATHY: CPT | Mod: CPTII,S$GLB,, | Performed by: INTERNAL MEDICINE

## 2022-12-28 PROCEDURE — 3061F PR NEG MICROALBUMINURIA RESULT DOCUMENTED/REVIEW: ICD-10-PCS | Mod: CPTII,S$GLB,, | Performed by: INTERNAL MEDICINE

## 2022-12-28 PROCEDURE — 3066F PR DOCUMENTATION OF TREATMENT FOR NEPHROPATHY: ICD-10-PCS | Mod: CPTII,S$GLB,, | Performed by: INTERNAL MEDICINE

## 2022-12-28 PROCEDURE — 3051F PR MOST RECENT HEMOGLOBIN A1C LEVEL 7.0 - < 8.0%: ICD-10-PCS | Mod: CPTII,S$GLB,, | Performed by: INTERNAL MEDICINE

## 2022-12-28 PROCEDURE — 3066F NEPHROPATHY DOC TX: CPT | Mod: CPTII,S$GLB,, | Performed by: INTERNAL MEDICINE

## 2022-12-28 PROCEDURE — 1160F PR REVIEW ALL MEDS BY PRESCRIBER/CLIN PHARMACIST DOCUMENTED: ICD-10-PCS | Mod: CPTII,S$GLB,, | Performed by: INTERNAL MEDICINE

## 2022-12-28 PROCEDURE — 3078F DIAST BP <80 MM HG: CPT | Mod: CPTII,S$GLB,, | Performed by: INTERNAL MEDICINE

## 2022-12-28 PROCEDURE — 99999 PR PBB SHADOW E&M-EST. PATIENT-LVL V: CPT | Mod: PBBFAC,,, | Performed by: INTERNAL MEDICINE

## 2022-12-28 PROCEDURE — 99999 PR PBB SHADOW E&M-EST. PATIENT-LVL V: ICD-10-PCS | Mod: PBBFAC,,, | Performed by: INTERNAL MEDICINE

## 2022-12-28 PROCEDURE — 99214 PR OFFICE/OUTPT VISIT, EST, LEVL IV, 30-39 MIN: ICD-10-PCS | Mod: S$GLB,,, | Performed by: INTERNAL MEDICINE

## 2022-12-28 PROCEDURE — 3061F NEG MICROALBUMINURIA REV: CPT | Mod: CPTII,S$GLB,, | Performed by: INTERNAL MEDICINE

## 2022-12-28 PROCEDURE — 3008F PR BODY MASS INDEX (BMI) DOCUMENTED: ICD-10-PCS | Mod: CPTII,S$GLB,, | Performed by: INTERNAL MEDICINE

## 2022-12-28 PROCEDURE — 4010F PR ACE/ARB THEARPY RXD/TAKEN: ICD-10-PCS | Mod: CPTII,S$GLB,, | Performed by: INTERNAL MEDICINE

## 2022-12-28 PROCEDURE — 1111F PR DISCHARGE MEDS RECONCILED W/ CURRENT OUTPATIENT MED LIST: ICD-10-PCS | Mod: CPTII,S$GLB,, | Performed by: INTERNAL MEDICINE

## 2022-12-28 PROCEDURE — 1159F MED LIST DOCD IN RCRD: CPT | Mod: CPTII,S$GLB,, | Performed by: INTERNAL MEDICINE

## 2022-12-28 PROCEDURE — 1126F PR PAIN SEVERITY QUANTIFIED, NO PAIN PRESENT: ICD-10-PCS | Mod: CPTII,S$GLB,, | Performed by: INTERNAL MEDICINE

## 2022-12-28 PROCEDURE — 1126F AMNT PAIN NOTED NONE PRSNT: CPT | Mod: CPTII,S$GLB,, | Performed by: INTERNAL MEDICINE

## 2022-12-28 PROCEDURE — 85027 COMPLETE CBC AUTOMATED: CPT | Performed by: FAMILY MEDICINE

## 2022-12-28 PROCEDURE — 3078F PR MOST RECENT DIASTOLIC BLOOD PRESSURE < 80 MM HG: ICD-10-PCS | Mod: CPTII,S$GLB,, | Performed by: INTERNAL MEDICINE

## 2022-12-28 NOTE — PROGRESS NOTES
Mitch Whittaker  67 y.o. Black or  male  [id number]    Chief Complaint:  Chief Complaint   Patient presents with    Hospital Follow Up       History of Present Illness:  Hospitalized from 12/7 to 12/10 for a fall due to low blood sugar reading. He had a negative head CT. He was found to have acute on chronic renal failure. He states he had a decreased appetite and diarrhea prior to hospitalization. 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 He is on antiviral therapy. His levels are decreasing and he is due to have labs today by home health.   He feels much better.   He is here with his daughter.     Laboratory:  Lab Results   Component Value Date    WBC 3.45 (L) 12/21/2022    HGB 8.4 (L) 12/21/2022    HCT 29.5 (L) 12/21/2022     12/21/2022    CHOL 183 09/19/2022    TRIG 113 09/19/2022    HDL 46 09/19/2022    ALT 9 (L) 12/21/2022    AST 17 12/21/2022     12/21/2022    K 3.7 12/21/2022     12/21/2022    CREATININE 2.6 (H) 12/21/2022    BUN 49 (H) 12/21/2022    CO2 29 12/21/2022    TSH 0.999 03/11/2022    PSA 0.33 10/18/2021    INR 1.0 06/18/2015    HGBA1C 7.1 (H) 12/09/2022       History:  Past Medical History:   Diagnosis Date    Acquired renal cyst of left kidney     Anemia associated with chronic renal failure     CAD (coronary artery disease)     nonobstructive [facility] 9/14    CHF (congestive heart failure)     Chronic immunosuppression with Prograf and MMF 06/18/2015    Chronic venous insufficiency of lower extremity     CKD (chronic kidney disease) stage 3, GFR 30-59 ml/min     Diabetic retinopathy     DM (diabetes mellitus), type 2 with complications 1994    Edema     End stage kidney disease     s/p transplant, doing well    Gallbladder polyp     Heart failure, diastolic, due to HTN     Hemodialysis status     off since transplant    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 RNA NEGATIVE 5/2015, 2021    History of colon polyps     HPTH (hyperparathyroidism)      "Hyperlipidemia     Hypertension associated with stage 3 chronic kidney disease due to type 2 diabetes mellitus     LBBB (left bundle branch block) 12/20/2021    Morbid obesity with BMI of 45.0-49.9, adult     PCO (posterior capsular opacification), left 03/04/2019    Proteinuria     resolved s/p transplant    S/P kidney transplant     Sleep apnea     Type 2 diabetes, uncontrolled, with retinopathy     Type II diabetes mellitus with renal manifestations        Medications:  Current Outpatient Medications on File Prior to Visit   Medication Sig Dispense Refill    apixaban (ELIQUIS) 5 mg Tab Take 1 tablet (5 mg total) by mouth 2 (two) times daily. 60 tablet 6    aspirin (ECOTRIN) 81 MG EC tablet Take 1 tablet by mouth Daily.       BD ULTRA-FINE MINI PEN NEEDLE 31 gauge x 3/16" Ndle Use to inject insulin as needed up to 4 times daily 100 each 3    blood sugar diagnostic Strp Use to test four times per day 150 strip 11    blood sugar diagnostic Strp 100 each by Misc.(Non-Drug; Combo Route) route 4 (four) times daily before meals and nightly. 100 each 1    blood-glucose meter (FREESTYLE LITE METER) kit 1 each 4 (four) times daily. 1 each 0    calcitRIOL (ROCALTROL) 0.25 MCG Cap Take 1 capsule (0.25 mcg total) by mouth once daily. 30 capsule 11    famotidine (PEPCID) 20 MG tablet TAKE ONE TABLET BY MOUTH EVERY EVENING 30 tablet 11    fish oil-omega-3 fatty acids 300-1,000 mg capsule Take 1 g by mouth once daily.      furosemide (LASIX) 80 MG tablet Take one-half tablet (40 mg) by mouth 2 (two) times daily. 30 tablet 11    glimepiride (AMARYL) 2 MG tablet Take 1 tablet (2 mg total) by mouth before breakfast. 90 tablet 3    insulin (LANTUS SOLOSTAR U-100 INSULIN) glargine 100 units/mL SubQ pen Inject 35 Units into the skin 2 (two) times daily. 30 mL 0    insulin aspart U-100 (NOVOLOG FLEXPEN U-100 INSULIN) 100 unit/mL (3 mL) InPn pen Inject 25 Units into the skin 3 (three) times daily with meals. 30 mL 0    ketoconazole " "(NIZORAL) 200 mg Tab Take HALF A tablet (100 mg total) by mouth once daily. 15 tablet 9    lancets (MICROLET LANCET) Misc 100 lancets by Misc.(Non-Drug; Combo Route) route 4 (four) times daily before meals and nightly. 100 each 11    magnesium oxide (MAG-OX) 400 mg (241.3 mg magnesium) tablet Take 1 tablet (400 mg total) by mouth once daily. 90 tablet 1    metOLazone (ZAROXOLYN) 2.5 MG tablet Take 1 tablet by mouth on Monday and Friday as directed 30 tablet 11    metoprolol succinate (TOPROL-XL) 25 MG 24 hr tablet Take 1 tablet (25 mg total) by mouth once daily. 30 tablet 11    montelukast (SINGULAIR) 10 mg tablet Take 1 tablet (10 mg total) by mouth every evening. 30 tablet 1    multivitamin (THERAGRAN) tablet Take 1 tablet by mouth once daily.      mupirocin (BACTROBAN) 2 % ointment Apply topically 3 (three) times daily. Use as directed on the leg wound. 22 g 1    mycophenolate (CELLCEPT) 250 mg Cap Take 3 capsules (750 mg total) by mouth 2 (two) times daily. 180 capsule 11    ondansetron (ZOFRAN) 4 MG tablet Take 1 tablet (4 mg total) by mouth every 6 (six) hours. 30 tablet 0    pen needle, diabetic (BD INSULIN PEN NEEDLE UF SHORT) 31 gauge x 5/16" Ndle USE TO INJECT INSULIN TWICE A  each 5    potassium chloride SA (K-DUR,KLOR-CON) 20 MEQ tablet Take 2 tablets (40 mEq total) by mouth once daily. 60 tablet 3    predniSONE (DELTASONE) 5 MG tablet Take 1 tablet (5 mg total) by mouth once daily. 30 tablet 11    promethazine-dextromethorphan (PROMETHAZINE-DM) 6.25-15 mg/5 mL Syrp Take by mouth.      rosuvastatin (CRESTOR) 40 MG Tab Take 1 tablet (40 mg total) by mouth once daily in the evening. 90 tablet 3    sacubitriL-valsartan (ENTRESTO)  mg per tablet Take 1 tablet by mouth 2 (two) times daily. 60 tablet 3    tacrolimus (PROGRAF) 1 MG Cap Take 4 capsules (4 mg total) by mouth every 12 (twelve) hours. 240 capsule 11    tamsulosin (FLOMAX) 0.4 mg Cap Take 1 capsule (0.4 mg total) by mouth once daily. 30 " capsule 11    triamcinolone acetonide 0.1% (KENALOG) 0.1 % ointment Apply topically 2 (two) times daily. Use on bilateral lower legs. 454 g 1    valGANciclovir (VALCYTE) 450 mg Tab Take 1 tablet (450 mg total) by mouth once daily. for 21 days 21 tablet 0     Current Facility-Administered Medications on File Prior to Visit   Medication Dose Route Frequency Provider Last Rate Last Admin    acetaminophen tablet 650 mg  650 mg Oral Once PRN Sal Lopez MD           Allergies:  Review of patient's allergies indicates:   Allergen Reactions    Lisinopril Other (See Comments)     Other reaction(s):  cough    Actos  [pioglitazone] Other (See Comments)     Other reaction(s): CHF    Metformin Other (See Comments)     Other reaction(s): renal insuff  Other reaction(s): CHF       Review of Systems   Constitutional:  Negative for fever.   Respiratory:  Positive for cough. Negative for shortness of breath.    Cardiovascular:  Negative for chest pain.   Gastrointestinal:  Negative for abdominal pain and diarrhea.   Neurological:  Negative for dizziness and headaches.     Exam:  Vitals:    12/28/22 1040   BP: (!) 98/50   Pulse: 103   Resp: 20   Temp: 97.6 °F (36.4 °C)     Weight: 123.9 kg (273 lb 2.4 oz)   Body mass index is 42.78 kg/m².      Physical Exam  Vitals reviewed.   Constitutional:       General: He is not in acute distress.     Appearance: He is well-developed. He is not ill-appearing.   Eyes:      General: No scleral icterus.  Cardiovascular:      Rate and Rhythm: Normal rate and regular rhythm.      Heart sounds: Normal heart sounds.   Pulmonary:      Effort: Pulmonary effort is normal. No respiratory distress.      Breath sounds: Normal breath sounds.   Skin:     General: Skin is warm and dry.   Neurological:      Mental Status: He is alert and oriented to person, place, and time.   Psychiatric:         Behavior: Behavior normal.       Assessment:  The primary encounter diagnosis was Hospital discharge follow-up.  Diagnoses of Hypoglycemia associated with diabetes and Acute renal failure superimposed on stage 4 chronic kidney disease, unspecified acute renal failure type were also pertinent to this visit.    Plan:  Hospital discharge follow-up  -     continue home health    Hypoglycemia associated with diabetes  -     recommend bedtime snack  -     recommend close glucose monitoring    Acute renal failure superimposed on stage 4 chronic kidney disease, unspecified acute renal failure type  -     f/u with nephrology     Follow up in about 3 months (around 3/28/2023).

## 2022-12-29 DIAGNOSIS — I50.42 CHRONIC COMBINED SYSTOLIC AND DIASTOLIC CONGESTIVE HEART FAILURE: ICD-10-CM

## 2022-12-29 LAB — CMV IGM SERPL IA-ACNC: <8 AU/ML

## 2022-12-29 RX ORDER — SACUBITRIL AND VALSARTAN 97; 103 MG/1; MG/1
1 TABLET, FILM COATED ORAL 2 TIMES DAILY
Qty: 60 TABLET | Refills: 3 | Status: CANCELLED | OUTPATIENT
Start: 2022-12-29

## 2022-12-29 RX ORDER — SACUBITRIL AND VALSARTAN 97; 103 MG/1; MG/1
1 TABLET, FILM COATED ORAL 2 TIMES DAILY
Qty: 60 TABLET | Refills: 3 | Status: SHIPPED | OUTPATIENT
Start: 2022-12-29 | End: 2023-02-15 | Stop reason: SDUPTHER

## 2022-12-30 ENCOUNTER — LAB VISIT (OUTPATIENT)
Dept: LAB | Facility: HOSPITAL | Age: 67
End: 2022-12-30
Attending: FAMILY MEDICINE
Payer: COMMERCIAL

## 2022-12-30 DIAGNOSIS — I50.42 CHRONIC COMBINED SYSTOLIC AND DIASTOLIC HEART FAILURE: ICD-10-CM

## 2022-12-30 DIAGNOSIS — N18.6 TYPE 2 DIABETES MELLITUS WITH END-STAGE RENAL DISEASE: Primary | ICD-10-CM

## 2022-12-30 DIAGNOSIS — D63.1 ANEMIA OF CHRONIC RENAL FAILURE: ICD-10-CM

## 2022-12-30 DIAGNOSIS — E11.22 TYPE 2 DIABETES MELLITUS WITH END-STAGE RENAL DISEASE: Primary | ICD-10-CM

## 2022-12-30 DIAGNOSIS — E11.319 DIABETIC RETINAL MICROANEURYSM: ICD-10-CM

## 2022-12-30 DIAGNOSIS — I13.0 HYPERTENSIVE HEART AND RENAL DISEASE WITH CONGESTIVE HEART FAILURE: ICD-10-CM

## 2022-12-30 DIAGNOSIS — H35.049 DIABETIC RETINAL MICROANEURYSM: ICD-10-CM

## 2022-12-30 DIAGNOSIS — N18.9 ANEMIA OF CHRONIC RENAL FAILURE: ICD-10-CM

## 2022-12-30 DIAGNOSIS — N18.30 CHRONIC KIDNEY DISEASE, STAGE III (MODERATE): ICD-10-CM

## 2022-12-30 PROCEDURE — 36415 COLL VENOUS BLD VENIPUNCTURE: CPT | Performed by: FAMILY MEDICINE

## 2023-01-03 LAB
CMV DNA SPEC QL NAA+PROBE: NOT DETECTED
CYTOMEGALOVIRUS LOG (IU/ML): NOT DETECTED LOGIU/ML
CYTOMEGALOVIRUS PCR, QUANT: NOT DETECTED IU/ML

## 2023-01-04 ENCOUNTER — HOSPITAL ENCOUNTER (OUTPATIENT)
Dept: RADIOLOGY | Facility: HOSPITAL | Age: 68
Discharge: HOME | End: 2023-01-04
Attending: PHYSICIAN ASSISTANT
Payer: COMMERCIAL

## 2023-01-04 ENCOUNTER — OFFICE VISIT (OUTPATIENT)
Dept: PULMONOLOGY | Facility: CLINIC | Age: 68
End: 2023-01-04
Payer: COMMERCIAL

## 2023-01-04 VITALS
SYSTOLIC BLOOD PRESSURE: 124 MMHG | HEART RATE: 98 BPM | OXYGEN SATURATION: 98 % | HEIGHT: 67 IN | BODY MASS INDEX: 42.7 KG/M2 | DIASTOLIC BLOOD PRESSURE: 60 MMHG | RESPIRATION RATE: 18 BRPM | WEIGHT: 272.06 LBS

## 2023-01-04 DIAGNOSIS — R05.3 CHRONIC COUGH: Primary | ICD-10-CM

## 2023-01-04 DIAGNOSIS — G47.33 OSA (OBSTRUCTIVE SLEEP APNEA): ICD-10-CM

## 2023-01-04 DIAGNOSIS — N18.30 HYPERTENSION ASSOCIATED WITH STAGE 3 CHRONIC KIDNEY DISEASE DUE TO TYPE 2 DIABETES MELLITUS: ICD-10-CM

## 2023-01-04 DIAGNOSIS — R05.3 CHRONIC COUGH: ICD-10-CM

## 2023-01-04 DIAGNOSIS — I12.9 HYPERTENSION ASSOCIATED WITH STAGE 3 CHRONIC KIDNEY DISEASE DUE TO TYPE 2 DIABETES MELLITUS: ICD-10-CM

## 2023-01-04 DIAGNOSIS — I50.42 CHRONIC COMBINED SYSTOLIC AND DIASTOLIC CONGESTIVE HEART FAILURE: ICD-10-CM

## 2023-01-04 DIAGNOSIS — E11.22 HYPERTENSION ASSOCIATED WITH STAGE 3 CHRONIC KIDNEY DISEASE DUE TO TYPE 2 DIABETES MELLITUS: ICD-10-CM

## 2023-01-04 DIAGNOSIS — E66.01 MORBID OBESITY WITH BMI OF 40.0-44.9, ADULT: ICD-10-CM

## 2023-01-04 PROCEDURE — 1160F PR REVIEW ALL MEDS BY PRESCRIBER/CLIN PHARMACIST DOCUMENTED: ICD-10-PCS | Mod: CPTII,S$GLB,, | Performed by: PHYSICIAN ASSISTANT

## 2023-01-04 PROCEDURE — 99204 OFFICE O/P NEW MOD 45 MIN: CPT | Mod: S$GLB,,, | Performed by: PHYSICIAN ASSISTANT

## 2023-01-04 PROCEDURE — 1159F MED LIST DOCD IN RCRD: CPT | Mod: CPTII,S$GLB,, | Performed by: PHYSICIAN ASSISTANT

## 2023-01-04 PROCEDURE — 70220 X-RAY EXAM OF SINUSES: CPT | Mod: 26,,, | Performed by: RADIOLOGY

## 2023-01-04 PROCEDURE — 1111F PR DISCHARGE MEDS RECONCILED W/ CURRENT OUTPATIENT MED LIST: ICD-10-PCS | Mod: CPTII,S$GLB,, | Performed by: PHYSICIAN ASSISTANT

## 2023-01-04 PROCEDURE — 3008F BODY MASS INDEX DOCD: CPT | Mod: CPTII,S$GLB,, | Performed by: PHYSICIAN ASSISTANT

## 2023-01-04 PROCEDURE — 1111F DSCHRG MED/CURRENT MED MERGE: CPT | Mod: CPTII,S$GLB,, | Performed by: PHYSICIAN ASSISTANT

## 2023-01-04 PROCEDURE — 3078F PR MOST RECENT DIASTOLIC BLOOD PRESSURE < 80 MM HG: ICD-10-PCS | Mod: CPTII,S$GLB,, | Performed by: PHYSICIAN ASSISTANT

## 2023-01-04 PROCEDURE — 1160F RVW MEDS BY RX/DR IN RCRD: CPT | Mod: CPTII,S$GLB,, | Performed by: PHYSICIAN ASSISTANT

## 2023-01-04 PROCEDURE — 3288F PR FALLS RISK ASSESSMENT DOCUMENTED: ICD-10-PCS | Mod: CPTII,S$GLB,, | Performed by: PHYSICIAN ASSISTANT

## 2023-01-04 PROCEDURE — 1100F PR PT FALLS ASSESS DOC 2+ FALLS/FALL W/INJURY/YR: ICD-10-PCS | Mod: CPTII,S$GLB,, | Performed by: PHYSICIAN ASSISTANT

## 2023-01-04 PROCEDURE — 3074F PR MOST RECENT SYSTOLIC BLOOD PRESSURE < 130 MM HG: ICD-10-PCS | Mod: CPTII,S$GLB,, | Performed by: PHYSICIAN ASSISTANT

## 2023-01-04 PROCEDURE — 3008F PR BODY MASS INDEX (BMI) DOCUMENTED: ICD-10-PCS | Mod: CPTII,S$GLB,, | Performed by: PHYSICIAN ASSISTANT

## 2023-01-04 PROCEDURE — 1159F PR MEDICATION LIST DOCUMENTED IN MEDICAL RECORD: ICD-10-PCS | Mod: CPTII,S$GLB,, | Performed by: PHYSICIAN ASSISTANT

## 2023-01-04 PROCEDURE — 3078F DIAST BP <80 MM HG: CPT | Mod: CPTII,S$GLB,, | Performed by: PHYSICIAN ASSISTANT

## 2023-01-04 PROCEDURE — 3074F SYST BP LT 130 MM HG: CPT | Mod: CPTII,S$GLB,, | Performed by: PHYSICIAN ASSISTANT

## 2023-01-04 PROCEDURE — 99204 PR OFFICE/OUTPT VISIT, NEW, LEVL IV, 45-59 MIN: ICD-10-PCS | Mod: S$GLB,,, | Performed by: PHYSICIAN ASSISTANT

## 2023-01-04 PROCEDURE — 3288F FALL RISK ASSESSMENT DOCD: CPT | Mod: CPTII,S$GLB,, | Performed by: PHYSICIAN ASSISTANT

## 2023-01-04 PROCEDURE — 70220 X-RAY EXAM OF SINUSES: CPT | Mod: TC

## 2023-01-04 PROCEDURE — 1100F PTFALLS ASSESS-DOCD GE2>/YR: CPT | Mod: CPTII,S$GLB,, | Performed by: PHYSICIAN ASSISTANT

## 2023-01-04 PROCEDURE — 99999 PR PBB SHADOW E&M-EST. PATIENT-LVL V: ICD-10-PCS | Mod: PBBFAC,,, | Performed by: PHYSICIAN ASSISTANT

## 2023-01-04 PROCEDURE — 70220 XR SINUSES MIN 3 VIEWS: ICD-10-PCS | Mod: 26,,, | Performed by: RADIOLOGY

## 2023-01-04 PROCEDURE — 99999 PR PBB SHADOW E&M-EST. PATIENT-LVL V: CPT | Mod: PBBFAC,,, | Performed by: PHYSICIAN ASSISTANT

## 2023-01-04 RX ORDER — ROSUVASTATIN CALCIUM 40 MG/1
40 TABLET, COATED ORAL DAILY
Qty: 90 TABLET | Refills: 1 | Status: SHIPPED | OUTPATIENT
Start: 2023-01-04 | End: 2023-07-04 | Stop reason: SDUPTHER

## 2023-01-04 NOTE — ASSESSMENT & PLAN NOTE
Clear lungs on exam  No cough during exam  Will get sinus xray, IgE, alpha 1 antitrypsin level today  Will schedule PFT, will order chest CT once complete to determine need for high resolution  Needs sleep study for sleep apnea, suspect nighttime cough related

## 2023-01-04 NOTE — PROGRESS NOTES
"Subjective:       Patient ID: Mitch Whittaker is a 67 y.o. male.    Chief Complaint: Cough      66yo male referred by Alix Kowalski DO for cough  Cough for 3 years  No known precipitating factors, thought it was "hay fever" initially  Coughs everyday, worse when he gets hot  Sometimes wakes up coughing  Sometimes with clear sputum  Has runny nose  Tried nasal spray with no benefit  Has Singulair on medication list, patient is not sure if he takes this  Has tried promethazine with some relief  Quit smoking 20 years ago  No asthma history  Takes pepcid for reflux  History of MARION, has not had CPAP since 2016  Sleep study in 2012  Kidney transplant, takes prograf  Had COVID 1/2022    Cough severity index 30  [place] 15    STOP-Bang Questionnaire (validated MARION screening questionnaire)  Negative unless checked off.  [x] Snoring    [x]  Tired/Fatigued/Sleepy  [x] Obstruction (apneas/choking)  [x] Pressure (HTN)  [x] BMI >35  [x] Age >50  [x] Neck >40 cm  [x] Gender male   STOP-Bang = 8 (low risk 0-2,high risk 3-8)      Immunization History   Administered Date(s) Administered    COVID-19 MRNA, LN-S PF (MODERNA HALF 0.25 ML DOSE) 12/17/2021    COVID-19, MRNA, LN-S, PF (Pfizer) (Gray Cap) 08/17/2022    COVID-19, MRNA, LN-S, PF (Pfizer) (Purple Cap) 03/05/2021, 03/26/2021    Hepatitis A, Adult 07/23/2014    Influenza 10/20/2009, 10/22/2010, 10/23/2012, 10/01/2020    Influenza (FLUAD) - Quadrivalent - Adjuvanted - PF *Preferred* (65+) 10/02/2020, 10/06/2021, 10/03/2022    Influenza - High Dose - PF (65 years and older) 09/22/2017, 10/02/2018, 09/26/2019    Influenza A (H1N1) 2009 Monovalent - IM - PF 12/04/2009    Pneumococcal Conjugate - 13 Valent 09/26/2019    Pneumococcal Polysaccharide - 23 Valent 11/01/2007, 07/08/2013, 03/30/2022    Tdap 12/04/2009, 03/18/2022    Zoster Recombinant 11/23/2021, 02/10/2022      Tobacco Use: Medium Risk    Smoking Tobacco Use: Former    Smokeless Tobacco Use: Former    Passive Exposure: " "Not on file      Past Medical History:   Diagnosis Date    Acquired renal cyst of left kidney     Anemia associated with chronic renal failure     CAD (coronary artery disease)     nonobstructive c 9/14    CHF (congestive heart failure)     Chronic immunosuppression with Prograf and MMF 06/18/2015    Chronic venous insufficiency of lower extremity     CKD (chronic kidney disease) stage 3, GFR 30-59 ml/min     Diabetic retinopathy     DM (diabetes mellitus), type 2 with complications 1994    Edema     End stage kidney disease     s/p transplant, doing well    Gallbladder polyp     Heart failure, diastolic, due to HTN     Hemodialysis status     off since transplant    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 RNA NEGATIVE 5/2015, 2021, 2022    History of colon polyps     HPTH (hyperparathyroidism)     Hyperlipidemia     Hypertension associated with stage 3 chronic kidney disease due to type 2 diabetes mellitus     LBBB (left bundle branch block) 12/20/2021    Morbid obesity with BMI of 45.0-49.9, adult     PCO (posterior capsular opacification), left 03/04/2019    Proteinuria     resolved s/p transplant    S/P kidney transplant     Sleep apnea     Type 2 diabetes, uncontrolled, with retinopathy     Type II diabetes mellitus with renal manifestations       Current Outpatient Medications on File Prior to Visit   Medication Sig Dispense Refill    apixaban (ELIQUIS) 5 mg Tab Take 1 tablet (5 mg total) by mouth 2 (two) times daily. 60 tablet 6    aspirin (ECOTRIN) 81 MG EC tablet Take 1 tablet by mouth Daily.       BD ULTRA-FINE MINI PEN NEEDLE 31 gauge x 3/16" Ndle Use to inject insulin as needed up to 4 times daily 100 each 3    blood sugar diagnostic Strp Use to test four times per day 150 strip 11    blood sugar diagnostic Strp 100 each by Misc.(Non-Drug; Combo Route) route 4 (four) times daily before meals and nightly. 100 each 1    blood-glucose meter (FREESTYLE LITE METER) kit 1 each " 4 (four) times daily. 1 each 0    calcitRIOL (ROCALTROL) 0.25 MCG Cap Take 1 capsule (0.25 mcg total) by mouth once daily. 30 capsule 11    famotidine (PEPCID) 20 MG tablet TAKE ONE TABLET BY MOUTH EVERY EVENING 30 tablet 11    fish oil-omega-3 fatty acids 300-1,000 mg capsule Take 1 g by mouth once daily.      furosemide (LASIX) 80 MG tablet Take one-half tablet (40 mg) by mouth 2 (two) times daily. 30 tablet 11    glimepiride (AMARYL) 2 MG tablet Take 1 tablet (2 mg total) by mouth before breakfast. 90 tablet 3    insulin (LANTUS SOLOSTAR U-100 INSULIN) glargine 100 units/mL SubQ pen Inject 35 Units into the skin 2 (two) times daily. 30 mL 0    insulin aspart U-100 (NOVOLOG FLEXPEN U-100 INSULIN) 100 unit/mL (3 mL) InPn pen Inject 25 Units into the skin 3 (three) times daily with meals. 30 mL 0    ketoconazole (NIZORAL) 200 mg Tab Take HALF A tablet (100 mg total) by mouth once daily. 15 tablet 9    lancets (MICROLET LANCET) Misc 100 lancets by Misc.(Non-Drug; Combo Route) route 4 (four) times daily before meals and nightly. 100 each 11    magnesium oxide (MAG-OX) 400 mg (241.3 mg magnesium) tablet Take 1 tablet (400 mg total) by mouth once daily. 90 tablet 1    metOLazone (ZAROXOLYN) 2.5 MG tablet Take 1 tablet by mouth on Monday and Friday as directed 30 tablet 11    metoprolol succinate (TOPROL-XL) 25 MG 24 hr tablet Take 1 tablet (25 mg total) by mouth once daily. 30 tablet 11    montelukast (SINGULAIR) 10 mg tablet Take 1 tablet (10 mg total) by mouth every evening. 30 tablet 1    multivitamin (THERAGRAN) tablet Take 1 tablet by mouth once daily.      mupirocin (BACTROBAN) 2 % ointment Apply topically 3 (three) times daily. Use as directed on the leg wound. 22 g 1    mycophenolate (CELLCEPT) 250 mg Cap Take 3 capsules (750 mg total) by mouth 2 (two) times daily. 180 capsule 11    ondansetron (ZOFRAN) 4 MG tablet Take 1 tablet (4 mg total) by mouth every 6 (six) hours. 30 tablet 0    pen needle, diabetic (BD  "INSULIN PEN NEEDLE UF SHORT) 31 gauge x 5/16" Ndle USE TO INJECT INSULIN TWICE A  each 5    potassium chloride SA (K-DUR,KLOR-CON) 20 MEQ tablet Take 2 tablets (40 mEq total) by mouth once daily. 60 tablet 3    predniSONE (DELTASONE) 5 MG tablet Take 1 tablet (5 mg total) by mouth once daily. 30 tablet 11    promethazine-dextromethorphan (PROMETHAZINE-DM) 6.25-15 mg/5 mL Syrp Take by mouth.      sacubitriL-valsartan (ENTRESTO)  mg per tablet Take 1 tablet by mouth 2 (two) times daily. 60 tablet 3    tacrolimus (PROGRAF) 1 MG Cap Take 4 capsules (4 mg total) by mouth every 12 (twelve) hours. 240 capsule 11    tamsulosin (FLOMAX) 0.4 mg Cap Take 1 capsule (0.4 mg total) by mouth once daily. 30 capsule 11    triamcinolone acetonide 0.1% (KENALOG) 0.1 % ointment Apply topically 2 (two) times daily. Use on bilateral lower legs. 454 g 1    valGANciclovir (VALCYTE) 450 mg Tab Take 1 tablet (450 mg total) by mouth once daily. for 21 days 21 tablet 0    [DISCONTINUED] rosuvastatin (CRESTOR) 40 MG Tab Take 1 tablet (40 mg total) by mouth once daily in the evening. 90 tablet 3     Current Facility-Administered Medications on File Prior to Visit   Medication Dose Route Frequency Provider Last Rate Last Admin    acetaminophen tablet 650 mg  650 mg Oral Once PRN Sal Lopez MD            Review of Systems   Constitutional:  Negative for fever, weight loss, appetite change, fatigue and weakness.   HENT:  Negative for postnasal drip, rhinorrhea, sinus pressure, trouble swallowing and congestion.    Respiratory:  Positive for cough. Negative for sputum production, choking, chest tightness, shortness of breath, wheezing and dyspnea on extertion.    Cardiovascular:  Negative for chest pain and leg swelling.   Musculoskeletal:  Negative for arthralgias, gait problem and joint swelling.   Gastrointestinal:  Negative for nausea, vomiting and abdominal pain.   Neurological:  Negative for dizziness, weakness and " "headaches.   All other systems reviewed and are negative.    Objective:       Vitals:    01/04/23 1026   BP: 124/60   Pulse: 98   Resp: 18   SpO2: 98%   Weight: 123.4 kg (272 lb 0.8 oz)   Height: 5' 7" (1.702 m)       Physical Exam   Constitutional: He is oriented to person, place, and time. He appears well-developed and well-nourished. No distress. He is obese.   HENT:   Head: Normocephalic.   Nose: Nose normal.   Mouth/Throat: Oropharynx is clear and moist. Mallampati Score: IV.   Cardiovascular: Normal rate and regular rhythm.   Pulmonary/Chest: Effort normal and breath sounds normal. No respiratory distress. He has no wheezes. He has no rhonchi. He has no rales.   Musculoskeletal:         General: No edema.      Cervical back: Normal range of motion and neck supple.   Lymphadenopathy: No supraclavicular adenopathy is present.     He has no cervical adenopathy.   Neurological: He is alert and oriented to person, place, and time. Gait normal.   Skin: Skin is warm and dry.   Psychiatric: He has a normal mood and affect.   Vitals reviewed.  Personal Diagnostic Review    CT Head Without Contrast  Narrative: EXAMINATION:  CT HEAD WITHOUT CONTRAST    CLINICAL HISTORY:  Head trauma, coagulopathy (Age 19-64y);    TECHNIQUE:  Low dose axial CT images obtained throughout the head without intravenous contrast. Sagittal and coronal reconstructions were performed.    COMPARISON:  None.    FINDINGS:  Atrophy and chronic white matter changes small    No extra-axial blood or fluid collections.    No parenchymal mass, hemorrhage, edema or major vascular distribution infarct.    Skull/extracranial contents (limited evaluation): No fracture. Mastoid air cells and paranasal sinuses are essentially clear.  Impression: SMALL CYST  No acute abnormality.    Atrophy and chronic white matter changes is soft soft    All CT scans   are performed using dose optimization techniques including the following: automated exposure control; " adjustment of the mA and/or kV; use of iterative reconstruction technique.  Dose modulation was employed for ALARA by means of: Automated exposure control; adjustment of the mA and/or kV according to patient size (this includes techniques or standardized protocols for targeted exams where dose is matched to indication/reason for exam; i.e. extremities or head); and/or use of iterative reconstructive technique.    Electronically signed by: Daniele Whatley  Date:    12/07/2022  Time:    18:18  X-Ray Chest 1 View  Narrative: EXAMINATION:  XR CHEST 1 VIEW    CLINICAL HISTORY:  Syncope and collapse    TECHNIQUE:  Single frontal view of the chest was performed.    COMPARISON:  None    FINDINGS:  Low lung volumes.  Cardiomegaly.  Mild central pulmonary vascular crowding.  Right axillary vascular stent.  Correlate clinically for element of CHF.    Bones are intact.  Impression: As above    Electronically signed by: Daniele Whatley  Date:    12/07/2022  Time:    17:28          No flowsheet data found.      Assessment/Plan:       Problem List Items Addressed This Visit          Pulmonary    Chronic cough - Primary     Clear lungs on exam  No cough during exam  Will get sinus xray, IgE, alpha 1 antitrypsin level today  Will schedule PFT, will order chest CT once complete to determine need for high resolution  Needs sleep study for sleep apnea, suspect nighttime cough related          Relevant Orders    X-Ray Sinuses 3 or more views    Complete PFT with bronchodilator    ALPHA 1 ANTITRYPSIN PHENOTYPE    Alpha-1-Antitrypsin    IGE    ASHA Screen w/Reflex       Cardiac/Vascular    Hypertension associated with stage 3 chronic kidney disease due to type 2 diabetes mellitus     F/u regularly with PCP           Chronic combined systolic and diastolic congestive heart failure     F/u regularly with cardiology      Echo    Interpretation Summary  · Concentric hypertrophy and mildly decreased systolic function.  · Mild tricuspid  regurgitation.  · Mild left atrial enlargement.  · The estimated ejection fraction is 40%.  · Left ventricular diastolic dysfunction.  · There is abnormal septal wall motion consistent with left bundle branch block.  · With normal right ventricular systolic function.  · Normal central venous pressure (3 mmHg).  · The estimated PA systolic pressure is 27 mmHg.              Endocrine    Morbid obesity with BMI of 40.0-44.9, adult     Weight loss and exercise to improve overall health.              Other    MARION (obstructive sleep apnea)    Relevant Orders    Polysomnogram (CPAP will be added if patient meets diagnostic criteria.)   Labs today, sinus xray today, PFT and PSG ordered. Up to date on vaccines  Follow up in about 4 weeks (around 2/1/2023) for labs and xray today; PFT next.    Discussed diagnosis, its evaluation, treatment and usual course. All questions answered.    Patient verbalized understanding of plan and left in no acute distress    Thank you for the courtesy of participating in the care of this patient    Aislinn Hdz, KATHY  [facility]

## 2023-01-04 NOTE — ASSESSMENT & PLAN NOTE
F/u regularly with cardiology      Echo    Interpretation Summary  · Concentric hypertrophy and mildly decreased systolic function.  · Mild tricuspid regurgitation.  · Mild left atrial enlargement.  · The estimated ejection fraction is 40%.  · Left ventricular diastolic dysfunction.  · There is abnormal septal wall motion consistent with left bundle branch block.  · With normal right ventricular systolic function.  · Normal central venous pressure (3 mmHg).  · The estimated PA systolic pressure is 27 mmHg.

## 2023-01-04 NOTE — TELEPHONE ENCOUNTER
No new care gaps identified.  [facility] Embedded Care Gaps. Reference number: [id number]. 1/04/2023   5:09:03 AM CST

## 2023-01-13 DIAGNOSIS — E11.22 HYPERTENSION ASSOCIATED WITH STAGE 3 CHRONIC KIDNEY DISEASE DUE TO TYPE 2 DIABETES MELLITUS: Primary | ICD-10-CM

## 2023-01-13 DIAGNOSIS — I12.9 HYPERTENSION ASSOCIATED WITH STAGE 3 CHRONIC KIDNEY DISEASE DUE TO TYPE 2 DIABETES MELLITUS: Primary | ICD-10-CM

## 2023-01-13 DIAGNOSIS — N18.30 HYPERTENSION ASSOCIATED WITH STAGE 3 CHRONIC KIDNEY DISEASE DUE TO TYPE 2 DIABETES MELLITUS: Primary | ICD-10-CM

## 2023-01-17 ENCOUNTER — OFFICE VISIT (OUTPATIENT)
Dept: ORTHOPEDICS | Facility: CLINIC | Age: 68
End: 2023-01-17
Payer: COMMERCIAL

## 2023-01-17 ENCOUNTER — HOSPITAL ENCOUNTER (OUTPATIENT)
Dept: RADIOLOGY | Facility: HOSPITAL | Age: 68
Discharge: HOME | End: 2023-01-17
Attending: ORTHOPAEDIC SURGERY
Payer: COMMERCIAL

## 2023-01-17 ENCOUNTER — TELEPHONE (OUTPATIENT)
Dept: ORTHOPEDICS | Facility: CLINIC | Age: 68
End: 2023-01-17
Payer: COMMERCIAL

## 2023-01-17 VITALS — BODY MASS INDEX: 42.69 KG/M2 | WEIGHT: 272 LBS | HEIGHT: 67 IN

## 2023-01-17 DIAGNOSIS — S52.515A NONDISPLACED FRACTURE OF LEFT RADIAL STYLOID PROCESS, INITIAL ENCOUNTER FOR CLOSED FRACTURE: Primary | ICD-10-CM

## 2023-01-17 DIAGNOSIS — R20.2 NUMBNESS AND TINGLING: Primary | ICD-10-CM

## 2023-01-17 DIAGNOSIS — R20.0 NUMBNESS AND TINGLING: Primary | ICD-10-CM

## 2023-01-17 DIAGNOSIS — M25.532 LEFT WRIST PAIN: ICD-10-CM

## 2023-01-17 PROCEDURE — 73110 X-RAY EXAM OF WRIST: CPT | Mod: TC,LT

## 2023-01-17 PROCEDURE — 1101F PT FALLS ASSESS-DOCD LE1/YR: CPT | Mod: CPTII,S$GLB,, | Performed by: ORTHOPAEDIC SURGERY

## 2023-01-17 PROCEDURE — 3008F PR BODY MASS INDEX (BMI) DOCUMENTED: ICD-10-PCS | Mod: CPTII,S$GLB,, | Performed by: ORTHOPAEDIC SURGERY

## 2023-01-17 PROCEDURE — 3288F PR FALLS RISK ASSESSMENT DOCUMENTED: ICD-10-PCS | Mod: CPTII,S$GLB,, | Performed by: ORTHOPAEDIC SURGERY

## 2023-01-17 PROCEDURE — 3008F BODY MASS INDEX DOCD: CPT | Mod: CPTII,S$GLB,, | Performed by: ORTHOPAEDIC SURGERY

## 2023-01-17 PROCEDURE — 1101F PR PT FALLS ASSESS DOC 0-1 FALLS W/OUT INJ PAST YR: ICD-10-PCS | Mod: CPTII,S$GLB,, | Performed by: ORTHOPAEDIC SURGERY

## 2023-01-17 PROCEDURE — 99999 PR PBB SHADOW E&M-EST. PATIENT-LVL V: CPT | Mod: PBBFAC,,, | Performed by: ORTHOPAEDIC SURGERY

## 2023-01-17 PROCEDURE — 1126F AMNT PAIN NOTED NONE PRSNT: CPT | Mod: CPTII,S$GLB,, | Performed by: ORTHOPAEDIC SURGERY

## 2023-01-17 PROCEDURE — 1160F PR REVIEW ALL MEDS BY PRESCRIBER/CLIN PHARMACIST DOCUMENTED: ICD-10-PCS | Mod: CPTII,S$GLB,, | Performed by: ORTHOPAEDIC SURGERY

## 2023-01-17 PROCEDURE — 99999 PR PBB SHADOW E&M-EST. PATIENT-LVL V: ICD-10-PCS | Mod: PBBFAC,,, | Performed by: ORTHOPAEDIC SURGERY

## 2023-01-17 PROCEDURE — 1159F PR MEDICATION LIST DOCUMENTED IN MEDICAL RECORD: ICD-10-PCS | Mod: CPTII,S$GLB,, | Performed by: ORTHOPAEDIC SURGERY

## 2023-01-17 PROCEDURE — 1126F PR PAIN SEVERITY QUANTIFIED, NO PAIN PRESENT: ICD-10-PCS | Mod: CPTII,S$GLB,, | Performed by: ORTHOPAEDIC SURGERY

## 2023-01-17 PROCEDURE — 3288F FALL RISK ASSESSMENT DOCD: CPT | Mod: CPTII,S$GLB,, | Performed by: ORTHOPAEDIC SURGERY

## 2023-01-17 PROCEDURE — 73110 X-RAY EXAM OF WRIST: CPT | Mod: 26,LT,, | Performed by: RADIOLOGY

## 2023-01-17 PROCEDURE — 99213 OFFICE O/P EST LOW 20 MIN: CPT | Mod: S$GLB,,, | Performed by: ORTHOPAEDIC SURGERY

## 2023-01-17 PROCEDURE — 1159F MED LIST DOCD IN RCRD: CPT | Mod: CPTII,S$GLB,, | Performed by: ORTHOPAEDIC SURGERY

## 2023-01-17 PROCEDURE — 73110 XR WRIST COMPLETE 3 VIEWS LEFT: ICD-10-PCS | Mod: 26,LT,, | Performed by: RADIOLOGY

## 2023-01-17 PROCEDURE — 1160F RVW MEDS BY RX/DR IN RCRD: CPT | Mod: CPTII,S$GLB,, | Performed by: ORTHOPAEDIC SURGERY

## 2023-01-17 PROCEDURE — 99213 PR OFFICE/OUTPT VISIT, EST, LEVL III, 20-29 MIN: ICD-10-PCS | Mod: S$GLB,,, | Performed by: ORTHOPAEDIC SURGERY

## 2023-01-17 RX ORDER — INSULIN ASPART 100 [IU]/ML
25 INJECTION, SOLUTION INTRAVENOUS; SUBCUTANEOUS
Qty: 30 ML | Refills: 2 | Status: SHIPPED | OUTPATIENT
Start: 2023-01-17 | End: 2023-09-10 | Stop reason: SDUPTHER

## 2023-01-17 NOTE — TELEPHONE ENCOUNTER
Refill Routing Note   Medication(s) are not appropriate for processing by [facility] for the following reason(s):      - Required laboratory values are abnormal  - Medication not previously prescribed by PCP    [facility] action(s):  Defer          Medication reconciliation completed: No     Appointments  past 12m or future 3m with PCP    Date Provider   Last Visit   12/28/2022 Alix Kowalski, DO   Next Visit   3/28/2023 Alix Kowalski, DO   ED visits in past 90 days: 0        Note composed:6:34 AM 01/17/2023

## 2023-01-17 NOTE — TELEPHONE ENCOUNTER
No new care gaps identified.  [facility] Embedded Care Gaps. Reference number: [id number]. 1/17/2023   6:27:14 AM CST

## 2023-01-17 NOTE — PROGRESS NOTES
Subjective:     Patient ID: Mitch Whittaker is a 67 y.o. male.    Chief Complaint: Pain of the Left Wrist      HPI:  The patient is a 67-year-old male with a left distal radius radial styloid nondisplaced fracture date of injury was 11/29/2002.  He is wearing a wrist splint.  He seems to be doing well.  He is developed bilateral carpal tunnel syndrome.  He has not had nerve conduction studies.    Past Medical History:   Diagnosis Date    Acquired renal cyst of left kidney     Anemia associated with chronic renal failure     CAD (coronary artery disease)     nonobstructive lhc 9/14    CHF (congestive heart failure)     Chronic immunosuppression with Prograf and MMF 06/18/2015    Chronic venous insufficiency of lower extremity     CKD (chronic kidney disease) stage 3, GFR 30-59 ml/min     Diabetic retinopathy     DM (diabetes mellitus), type 2 with complications 1994    Edema     End stage kidney disease     s/p transplant, doing well    Gallbladder polyp     Heart failure, diastolic, due to HTN     Hemodialysis status     off since transplant    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 RNA NEGATIVE 5/2015, 2021, 2022    History of colon polyps     HPTH (hyperparathyroidism)     Hyperlipidemia     Hypertension associated with stage 3 chronic kidney disease due to type 2 diabetes mellitus     LBBB (left bundle branch block) 12/20/2021    Morbid obesity with BMI of 45.0-49.9, adult     PCO (posterior capsular opacification), left 03/04/2019    Proteinuria     resolved s/p transplant    S/P kidney transplant     Sleep apnea     Type 2 diabetes, uncontrolled, with retinopathy     Type II diabetes mellitus with renal manifestations      Past Surgical History:   Procedure Laterality Date    CARDIAC CATHETERIZATION  2008    normal coronary    COLONOSCOPY N/A 4/5/2018    Procedure: COLONOSCOPY;  Surgeon: Chava Ronquillo MD;  Location: [facility];  Service: Endoscopy;  Laterality: N/A;     "COLONOSCOPY N/A 2022    Procedure: COLONOSCOPY;  Surgeon: Alix Puente MD;  Location: [facility];  Service: Endoscopy;  Laterality: N/A;    KIDNEY TRANSPLANT      RETINAL LASER PROCEDURE       Family History   Problem Relation Age of Onset    Diabetes Mother     Hypertension Mother     Heart failure Mother     Kidney disease Sister         ESRD    Diabetes Sister     Diabetes Maternal Grandmother     Cancer Neg Hx      Social History     Socioeconomic History    Marital status:     Number of children: 2   Occupational History    Occupation: retired     Employer: Retired   Tobacco Use    Smoking status: Former     Types: Cigarettes     Quit date: 2013     Years since quittin.6    Smokeless tobacco: Former     Quit date: 2013    Tobacco comments:     used marijuana since [id number], stopped after started dialysis   Substance and Sexual Activity    Alcohol use: No     Alcohol/week: 0.0 standard drinks    Drug use: No     Comment:      Sexual activity: Never   Social History Narrative    . Lives with spouse. Has 2 children. Patient retired as  for [employer]. He has been washing cars.     Medication List with Changes/Refills   Current Medications    APIXABAN (ELIQUIS) 5 MG TAB    Take 1 tablet (5 mg total) by mouth 2 (two) times daily.    ASPIRIN (ECOTRIN) 81 MG EC TABLET    Take 1 tablet by mouth Daily.     BD ULTRA-FINE MINI PEN NEEDLE 31 GAUGE X 3/16" NDLE    Use to inject insulin as needed up to 4 times daily    BLOOD SUGAR DIAGNOSTIC STRP    Use to test four times per day    BLOOD SUGAR DIAGNOSTIC STRP    100 each by Misc.(Non-Drug; Combo Route) route 4 (four) times daily before meals and nightly.    BLOOD-GLUCOSE METER (FREESTYLE LITE METER) KIT    1 each 4 (four) times daily.    CALCITRIOL (ROCALTROL) 0.25 MCG CAP    Take 1 capsule (0.25 mcg total) by mouth once daily.    FAMOTIDINE (PEPCID) 20 MG TABLET    TAKE ONE TABLET BY MOUTH EVERY EVENING    FISH " "OIL-OMEGA-3 FATTY ACIDS 300-1,000 MG CAPSULE    Take 1 g by mouth once daily.    FUROSEMIDE (LASIX) 80 MG TABLET    Take one-half tablet (40 mg) by mouth 2 (two) times daily.    GLIMEPIRIDE (AMARYL) 2 MG TABLET    Take 1 tablet (2 mg total) by mouth before breakfast.    INSULIN (LANTUS SOLOSTAR U-100 INSULIN) GLARGINE 100 UNITS/ML SUBQ PEN    Inject 35 Units into the skin 2 (two) times daily.    INSULIN ASPART U-100 (NOVOLOG FLEXPEN U-100 INSULIN) 100 UNIT/ML (3 ML) INPN PEN    Inject 25 Units into the skin 3 (three) times daily with meals.    KETOCONAZOLE (NIZORAL) 200 MG TAB    Take HALF A tablet (100 mg total) by mouth once daily.    LANCETS (MICROLET LANCET) MISC    100 lancets by Misc.(Non-Drug; Combo Route) route 4 (four) times daily before meals and nightly.    MAGNESIUM OXIDE (MAG-OX) 400 MG (241.3 MG MAGNESIUM) TABLET    Take 1 tablet (400 mg total) by mouth once daily.    METOLAZONE (ZAROXOLYN) 2.5 MG TABLET    Take 1 tablet by mouth on Monday and Friday as directed    METOPROLOL SUCCINATE (TOPROL-XL) 25 MG 24 HR TABLET    Take 1 tablet (25 mg total) by mouth once daily.    MONTELUKAST (SINGULAIR) 10 MG TABLET    Take 1 tablet (10 mg total) by mouth every evening.    MULTIVITAMIN (THERAGRAN) TABLET    Take 1 tablet by mouth once daily.    MUPIROCIN (BACTROBAN) 2 % OINTMENT    Apply topically 3 (three) times daily. Use as directed on the leg wound.    MYCOPHENOLATE (CELLCEPT) 250 MG CAP    Take 3 capsules (750 mg total) by mouth 2 (two) times daily.    ONDANSETRON (ZOFRAN) 4 MG TABLET    Take 1 tablet (4 mg total) by mouth every 6 (six) hours.    PEN NEEDLE, DIABETIC (BD INSULIN PEN NEEDLE UF SHORT) 31 GAUGE X 5/16" NDLE    USE TO INJECT INSULIN TWICE A DAY    POTASSIUM CHLORIDE SA (K-DUR,KLOR-CON) 20 MEQ TABLET    Take 2 tablets (40 mEq total) by mouth once daily.    PREDNISONE (DELTASONE) 5 MG TABLET    Take 1 tablet (5 mg total) by mouth once daily.    PROMETHAZINE-DEXTROMETHORPHAN (PROMETHAZINE-DM) " 6.25-15 MG/5 ML SYRP    Take by mouth.    ROSUVASTATIN (CRESTOR) 40 MG TAB    Take 1 tablet (40 mg total) by mouth once daily in the evening.    SACUBITRIL-VALSARTAN (ENTRESTO)  MG PER TABLET    Take 1 tablet by mouth 2 (two) times daily.    TACROLIMUS (PROGRAF) 1 MG CAP    Take 4 capsules (4 mg total) by mouth every 12 (twelve) hours.    TAMSULOSIN (FLOMAX) 0.4 MG CAP    Take 1 capsule (0.4 mg total) by mouth once daily.    TRIAMCINOLONE ACETONIDE 0.1% (KENALOG) 0.1 % OINTMENT    Apply topically 2 (two) times daily. Use on bilateral lower legs.    VALGANCICLOVIR (VALCYTE) 450 MG TAB    Take 1 tablet (450 mg total) by mouth once daily. for 21 days     Review of patient's allergies indicates:   Allergen Reactions    Lisinopril Other (See Comments)     Other reaction(s):  cough    Actos  [pioglitazone] Other (See Comments)     Other reaction(s): CHF    Metformin Other (See Comments)     Other reaction(s): renal insuff  Other reaction(s): CHF     Review of Systems   Constitutional: Negative for malaise/fatigue.   HENT:  Negative for hearing loss.    Eyes:  Negative for double vision and visual disturbance.   Cardiovascular:  Positive for chest pain and syncope.   Respiratory:  Positive for sleep disturbances due to breathing. Negative for shortness of breath.    Endocrine: Negative for cold intolerance.   Hematologic/Lymphatic: Does not bruise/bleed easily.   Skin:  Negative for poor wound healing and suspicious lesions.   Musculoskeletal:  Positive for falls. Negative for gout, joint pain and joint swelling.   Gastrointestinal:  Positive for diarrhea. Negative for nausea and vomiting.   Genitourinary:  Positive for flank pain. Negative for dysuria.   Neurological:  Positive for dizziness, numbness, paresthesias and sensory change.   Psychiatric/Behavioral:  Negative for depression, memory loss and substance abuse. The patient is not nervous/anxious.    Allergic/Immunologic: Negative for persistent infections.      Objective:   Body mass index is 42.6 kg/m².  There were no vitals filed for this visit.             General    Constitutional: He is oriented to person, place, and time. He appears well-developed and well-nourished. No distress.   HENT:   Head: Normocephalic.   Eyes: EOM are normal.   Pulmonary/Chest: Effort normal.   Neurological: He is oriented to person, place, and time.   Psychiatric: He has a normal mood and affect.             Right Hand/Wrist Exam     Inspection   Scars: Wrist - absent Hand -  absent  Effusion: Wrist - absent Hand -  absent    Pain   Wrist - The patient exhibits pain of the flexor/pronator group.    Tests   Phalens sign: positive  Tinel's sign (median nerve): positive  Carpal Tunnel Compression Test: positive    Atrophy   Thenar:  negative  Intrinsic:  negative    Other     Neuorologic Exam    Median Distribution: abnormal  Ulnar Distribution: normal  Radial Distribution: normal    Comments:  The patient has a positive Tinel and positive Phalen sign.  There is no thenar atrophy noted.      Left Hand/Wrist Exam     Inspection   Scars: Wrist - absent Hand -  absent  Effusion: Wrist - absent Hand -  absent    Tests   Phalens sign: positive  Tinel's sign (median nerve): positive  Carpal Tunnel Compression Test: positive    Atrophy  Thenar:  Negative  Intrinsic: negative    Other     Sensory Exam  Ulnar Distribution: normal  Radial Distribution: normal    Comments:  The patient has a positive Tinel and positive Phalen sign.  There is no thenar atrophy noted.          Vascular Exam       Capillary Refill  Right Hand: normal capillary refill  Left Hand: normal capillary refill        Relevant imaging results reviewed and interpreted by me, discussed with the patient and / or family today radiographs left wrist showed anatomic position and healing of the radial styloid fracture  Assessment:     Encounter Diagnosis   Name Primary?    Nondisplaced fracture of left radial styloid process, initial  encounter for closed fracture Yes        Plan:       The patient is sent for nerve conduction studies for further documentation and will return with that study              Disclaimer: This note was prepared using a voice recognition system and is likely to have sound alike errors within the text.

## 2023-01-18 ENCOUNTER — EXTERNAL HOME HEALTH (OUTPATIENT)
Dept: HOME HEALTH SERVICES | Facility: HOSPITAL | Age: 68
End: 2023-01-18
Payer: COMMERCIAL

## 2023-01-18 ENCOUNTER — PATIENT MESSAGE (OUTPATIENT)
Dept: PHARMACY | Facility: CLINIC | Age: 68
End: 2023-01-18
Payer: COMMERCIAL

## 2023-01-18 NOTE — TELEPHONE ENCOUNTER
Refill Routing Note   Medication(s) are not appropriate for processing by [facility] for the following reason(s):      - Required laboratory values are abnormal    [facility] action(s):  Defer          Medication reconciliation completed: No     Appointments  past 12m or future 3m with PCP    Date Provider   Last Visit   12/28/2022 Alix Kowalski, DO   Next Visit   3/28/2023 Alix Kowalski, DO   ED visits in past 90 days: 0        Note composed:6:14 AM 01/18/2023

## 2023-01-19 RX ORDER — INSULIN GLARGINE 100 [IU]/ML
35 INJECTION, SOLUTION SUBCUTANEOUS 2 TIMES DAILY
Qty: 30 ML | Refills: 0 | Status: SHIPPED | OUTPATIENT
Start: 2023-01-19 | End: 2023-03-26 | Stop reason: SDUPTHER

## 2023-01-20 ENCOUNTER — HOSPITAL ENCOUNTER (OUTPATIENT)
Dept: SLEEP MEDICINE | Facility: HOSPITAL | Age: 68
Discharge: HOME | End: 2023-01-20
Attending: PHYSICIAN ASSISTANT
Payer: COMMERCIAL

## 2023-01-20 DIAGNOSIS — G47.33 OBSTRUCTIVE SLEEP APNEA: Primary | ICD-10-CM

## 2023-01-20 DIAGNOSIS — F51.04 PSYCHOPHYSIOLOGICAL INSOMNIA: ICD-10-CM

## 2023-01-20 DIAGNOSIS — G47.63 SLEEP-RELATED BRUXISM: ICD-10-CM

## 2023-01-20 DIAGNOSIS — F51.5 NIGHTMARES: ICD-10-CM

## 2023-01-20 DIAGNOSIS — Z72.821 INADEQUATE SLEEP HYGIENE: ICD-10-CM

## 2023-01-20 PROCEDURE — 95811 POLYSOM 6/>YRS CPAP 4/> PARM: CPT

## 2023-01-25 ENCOUNTER — TELEPHONE (OUTPATIENT)
Dept: INTERNAL MEDICINE | Facility: CLINIC | Age: 68
End: 2023-01-25
Payer: COMMERCIAL

## 2023-01-25 NOTE — TELEPHONE ENCOUNTER
Informed home health nurse that Dr. Kowalski thinks the patient needs to come in to be evaluated. Sisi asked to give give patient a call since she doesn't know his schedule.    Called patient he's scheduled to come in on 1/27/23 since he have to come and do labs that day.

## 2023-01-25 NOTE — TELEPHONE ENCOUNTER
----- Message from Danae Martel sent at 1/25/2023 12:47 PM CST -----  Contact: ariel Laird from [facility] is reporting that the patient might have fluid in his left knee and she is asking if it should be drained.    The patient has discomfort in his chest and rib area and he did not know if it was from drinking milk or strained while coughing.  Please advise.    Please call Sisi at [phone].    Thanks

## 2023-01-27 ENCOUNTER — OFFICE VISIT (OUTPATIENT)
Dept: INTERNAL MEDICINE | Facility: CLINIC | Age: 68
End: 2023-01-27
Payer: COMMERCIAL

## 2023-01-27 ENCOUNTER — HOSPITAL ENCOUNTER (OUTPATIENT)
Dept: RADIOLOGY | Facility: HOSPITAL | Age: 68
Discharge: HOME | End: 2023-01-27
Payer: COMMERCIAL

## 2023-01-27 VITALS
RESPIRATION RATE: 20 BRPM | TEMPERATURE: 98 F | HEART RATE: 110 BPM | WEIGHT: 263.88 LBS | BODY MASS INDEX: 41.33 KG/M2 | OXYGEN SATURATION: 98 % | DIASTOLIC BLOOD PRESSURE: 72 MMHG | SYSTOLIC BLOOD PRESSURE: 106 MMHG

## 2023-01-27 DIAGNOSIS — R06.02 SOB (SHORTNESS OF BREATH): ICD-10-CM

## 2023-01-27 DIAGNOSIS — I12.9 HYPERTENSION ASSOCIATED WITH STAGE 3 CHRONIC KIDNEY DISEASE DUE TO TYPE 2 DIABETES MELLITUS: ICD-10-CM

## 2023-01-27 DIAGNOSIS — R79.89 POSITIVE D DIMER: ICD-10-CM

## 2023-01-27 DIAGNOSIS — M54.12 CERVICAL RADICULOPATHY: ICD-10-CM

## 2023-01-27 DIAGNOSIS — Z29.89 PROPHYLACTIC IMMUNOTHERAPY: ICD-10-CM

## 2023-01-27 DIAGNOSIS — R05.3 CHRONIC COUGH: ICD-10-CM

## 2023-01-27 DIAGNOSIS — I50.42 CHRONIC COMBINED SYSTOLIC AND DIASTOLIC CHF (CONGESTIVE HEART FAILURE): ICD-10-CM

## 2023-01-27 DIAGNOSIS — I44.7 LBBB (LEFT BUNDLE BRANCH BLOCK): ICD-10-CM

## 2023-01-27 DIAGNOSIS — E11.22 HYPERTENSION ASSOCIATED WITH STAGE 3 CHRONIC KIDNEY DISEASE DUE TO TYPE 2 DIABETES MELLITUS: ICD-10-CM

## 2023-01-27 DIAGNOSIS — Z94.0 KIDNEY TRANSPLANTED: ICD-10-CM

## 2023-01-27 DIAGNOSIS — R07.9 CHEST PAIN, UNSPECIFIED TYPE: Primary | ICD-10-CM

## 2023-01-27 DIAGNOSIS — N18.30 HYPERTENSION ASSOCIATED WITH STAGE 3 CHRONIC KIDNEY DISEASE DUE TO TYPE 2 DIABETES MELLITUS: ICD-10-CM

## 2023-01-27 DIAGNOSIS — E66.01 MORBID OBESITY WITH BMI OF 40.0-44.9, ADULT: ICD-10-CM

## 2023-01-27 DIAGNOSIS — G47.33 OSA (OBSTRUCTIVE SLEEP APNEA): ICD-10-CM

## 2023-01-27 DIAGNOSIS — Z79.01 CHRONIC ANTICOAGULATION: ICD-10-CM

## 2023-01-27 PROCEDURE — 3078F DIAST BP <80 MM HG: CPT | Mod: CPTII,S$GLB,,

## 2023-01-27 PROCEDURE — 1101F PT FALLS ASSESS-DOCD LE1/YR: CPT | Mod: CPTII,S$GLB,,

## 2023-01-27 PROCEDURE — 3288F FALL RISK ASSESSMENT DOCD: CPT | Mod: CPTII,S$GLB,,

## 2023-01-27 PROCEDURE — 1125F AMNT PAIN NOTED PAIN PRSNT: CPT | Mod: CPTII,S$GLB,,

## 2023-01-27 PROCEDURE — 93010 EKG 12-LEAD: ICD-10-PCS | Mod: S$GLB,,, | Performed by: INTERNAL MEDICINE

## 2023-01-27 PROCEDURE — 93010 ELECTROCARDIOGRAM REPORT: CPT | Mod: S$GLB,,, | Performed by: INTERNAL MEDICINE

## 2023-01-27 PROCEDURE — 3078F PR MOST RECENT DIASTOLIC BLOOD PRESSURE < 80 MM HG: ICD-10-PCS | Mod: CPTII,S$GLB,,

## 2023-01-27 PROCEDURE — 71046 XR CHEST PA AND LATERAL: ICD-10-PCS | Mod: 26,,, | Performed by: RADIOLOGY

## 2023-01-27 PROCEDURE — 1159F MED LIST DOCD IN RCRD: CPT | Mod: CPTII,S$GLB,,

## 2023-01-27 PROCEDURE — 1160F RVW MEDS BY RX/DR IN RCRD: CPT | Mod: CPTII,S$GLB,,

## 2023-01-27 PROCEDURE — 1160F PR REVIEW ALL MEDS BY PRESCRIBER/CLIN PHARMACIST DOCUMENTED: ICD-10-PCS | Mod: CPTII,S$GLB,,

## 2023-01-27 PROCEDURE — 3008F BODY MASS INDEX DOCD: CPT | Mod: CPTII,S$GLB,,

## 2023-01-27 PROCEDURE — 1125F PR PAIN SEVERITY QUANTIFIED, PAIN PRESENT: ICD-10-PCS | Mod: CPTII,S$GLB,,

## 2023-01-27 PROCEDURE — 3074F SYST BP LT 130 MM HG: CPT | Mod: CPTII,S$GLB,,

## 2023-01-27 PROCEDURE — 3008F PR BODY MASS INDEX (BMI) DOCUMENTED: ICD-10-PCS | Mod: CPTII,S$GLB,,

## 2023-01-27 PROCEDURE — 1101F PR PT FALLS ASSESS DOC 0-1 FALLS W/OUT INJ PAST YR: ICD-10-PCS | Mod: CPTII,S$GLB,,

## 2023-01-27 PROCEDURE — 99215 PR OFFICE/OUTPT VISIT, EST, LEVL V, 40-54 MIN: ICD-10-PCS | Mod: S$GLB,,,

## 2023-01-27 PROCEDURE — 4010F ACE/ARB THERAPY RXD/TAKEN: CPT | Mod: CPTII,S$GLB,,

## 2023-01-27 PROCEDURE — 93005 ELECTROCARDIOGRAM TRACING: CPT | Mod: S$GLB,,,

## 2023-01-27 PROCEDURE — 71046 X-RAY EXAM CHEST 2 VIEWS: CPT | Mod: 26,,, | Performed by: RADIOLOGY

## 2023-01-27 PROCEDURE — 1159F PR MEDICATION LIST DOCUMENTED IN MEDICAL RECORD: ICD-10-PCS | Mod: CPTII,S$GLB,,

## 2023-01-27 PROCEDURE — 99999 PR PBB SHADOW E&M-EST. PATIENT-LVL V: CPT | Mod: PBBFAC,,,

## 2023-01-27 PROCEDURE — 72050 X-RAY EXAM NECK SPINE 4/5VWS: CPT | Mod: 26,,, | Performed by: RADIOLOGY

## 2023-01-27 PROCEDURE — 99215 OFFICE O/P EST HI 40 MIN: CPT | Mod: S$GLB,,,

## 2023-01-27 PROCEDURE — 3074F PR MOST RECENT SYSTOLIC BLOOD PRESSURE < 130 MM HG: ICD-10-PCS | Mod: CPTII,S$GLB,,

## 2023-01-27 PROCEDURE — 71046 X-RAY EXAM CHEST 2 VIEWS: CPT | Mod: TC

## 2023-01-27 PROCEDURE — 3288F PR FALLS RISK ASSESSMENT DOCUMENTED: ICD-10-PCS | Mod: CPTII,S$GLB,,

## 2023-01-27 PROCEDURE — 72050 XR CERVICAL SPINE COMPLETE 5 VIEW: ICD-10-PCS | Mod: 26,,, | Performed by: RADIOLOGY

## 2023-01-27 PROCEDURE — 72050 X-RAY EXAM NECK SPINE 4/5VWS: CPT | Mod: TC

## 2023-01-27 PROCEDURE — 93005 EKG 12-LEAD: ICD-10-PCS | Mod: S$GLB,,,

## 2023-01-27 PROCEDURE — 4010F PR ACE/ARB THEARPY RXD/TAKEN: ICD-10-PCS | Mod: CPTII,S$GLB,,

## 2023-01-27 PROCEDURE — 99999 PR PBB SHADOW E&M-EST. PATIENT-LVL V: ICD-10-PCS | Mod: PBBFAC,,,

## 2023-01-27 RX ORDER — DICLOFENAC SODIUM 10 MG/G
2 GEL TOPICAL 3 TIMES DAILY
Qty: 450 G | Refills: 2 | Status: SHIPPED | OUTPATIENT
Start: 2023-01-27 | End: 2023-09-01

## 2023-01-27 RX ORDER — PROMETHAZINE HYDROCHLORIDE AND DEXTROMETHORPHAN HYDROBROMIDE 6.25; 15 MG/5ML; MG/5ML
5 SYRUP ORAL EVERY 6 HOURS PRN
Qty: 180 ML | Refills: 0 | Status: SHIPPED | OUTPATIENT
Start: 2023-01-27

## 2023-01-27 NOTE — PROGRESS NOTES
"Mitch Panfilo  01/27/2023  [id number]    Alix Kowalski DO  Patient Care Team:  Alix Kowalski DO as PCP - General (Internal Medicine)  Adelita Mo LPN as Care Coordinator (Internal Medicine)  Kathie Herring, RN as Heart Failure Coordinator (Advanced Heart Failure & Transplant Cardiology)  Ann Marie Bermudez RN as Heart Failure Coordinator (Advanced Heart Failure & Transplant Cardiology)  Yessenia Velarde LPN as Heart Failure Coordinator (Advanced Heart Failure & Transplant Cardiology)          Visit Type:an urgent visit for a new problem    Chief Complaint:  Chief Complaint   Patient presents with    Muscle Pain     Muscle pains and sharp "electrical" feelings since Wednesday in chest and back. 8/10. Accompanied by SOB and inability to fully turn head.       History of Present Illness:    A few days ago the pain started across his chest  Now the pain is radiating to bilateral shoulder blades    He has not been urinating as much   Took a lasix on Monday. That usually makes him have frequent urination until Saturday  This week it did not cause as much urination     Swelling in his legs have decreased  Previously he had trouble ambulating because of the swelling  At times he needs help getting out of the chair     He has a dry cough.   Needs a refill on cough syrup    Has pain in his neck  Makes it difficult to turn his neck  Pain radiates down his arms    Describes pain in chest as "electrical"  He does have pain when he takes a deep breath     History:  Past Medical History:   Diagnosis Date    Acquired renal cyst of left kidney     Anemia associated with chronic renal failure     CAD (coronary artery disease)     nonobstructive c 9/14    CHF (congestive heart failure)     Chronic immunosuppression with Prograf and MMF 06/18/2015    Chronic venous insufficiency of lower extremity     CKD (chronic kidney disease) stage 3, GFR 30-59 ml/min     Diabetic retinopathy     DM (diabetes mellitus), type 2 " with complications 1994    Edema     End stage kidney disease     s/p transplant, doing well    Gallbladder polyp     Heart failure, diastolic, due to HTN     Hemodialysis status     off since transplant    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 RNA NEGATIVE 2015, ,     History of colon polyps     HPTH (hyperparathyroidism)     Hyperlipidemia     Hypertension associated with stage 3 chronic kidney disease due to type 2 diabetes mellitus     LBBB (left bundle branch block) 2021    Morbid obesity with BMI of 45.0-49.9, adult     PCO (posterior capsular opacification), left 2019    Proteinuria     resolved s/p transplant    S/P kidney transplant     Sleep apnea     Type 2 diabetes, uncontrolled, with retinopathy     Type II diabetes mellitus with renal manifestations      Past Surgical History:   Procedure Laterality Date    CARDIAC CATHETERIZATION      normal coronary    COLONOSCOPY N/A 2018    Procedure: COLONOSCOPY;  Surgeon: Chava Ronquillo MD;  Location: [facility] ENDO;  Service: Endoscopy;  Laterality: N/A;    COLONOSCOPY N/A 2022    Procedure: COLONOSCOPY;  Surgeon: Alix Puente MD;  Location: [facility] ENDO;  Service: Endoscopy;  Laterality: N/A;    KIDNEY TRANSPLANT      RETINAL LASER PROCEDURE       Family History   Problem Relation Age of Onset    Diabetes Mother     Hypertension Mother     Heart failure Mother     Kidney disease Sister         ESRD    Diabetes Sister     Diabetes Maternal Grandmother     Cancer Neg Hx      Social History     Socioeconomic History    Marital status:     Number of children: 2   Occupational History    Occupation: retired     Employer: Retired   Tobacco Use    Smoking status: Former     Types: Cigarettes     Quit date: 2013     Years since quittin.6     Passive exposure: Past    Smokeless tobacco: Former     Quit date: 2013    Tobacco comments:     used marijuana since [id number], stopped after  started dialysis   Substance and Sexual Activity    Alcohol use: No     Alcohol/week: 0.0 standard drinks    Drug use: No     Comment:      Sexual activity: Never   Social History Narrative    . Lives with spouse. Has 2 children. Patient retired as  for [employer]. He has been washing cars.     Patient Active Problem List   Diagnosis    Anemia associated with chronic renal failure    Obesity    Acquired renal cyst of left kidney    Nephrolithiasis    MARION (obstructive sleep apnea)    Pseudophakia    CAD (coronary artery disease)    Living-related donor kidney transplant (daughter) - 6/12/15    Diabetic peripheral neuropathy    Chronic immunosuppression with Prograf, MMF and prednisone    Secondary hyperparathyroidism of renal origin    CKD (chronic kidney disease) stage 3, GFR 30-59 ml/min    Type II diabetes mellitus with renal manifestations    Hypertension associated with stage 3 chronic kidney disease due to type 2 diabetes mellitus    DM (diabetes mellitus), type 2 with complications    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, with long-term current use of insulin    Epiretinal membrane (ERM) of both eyes    History of colon polyps    Benign prostatic hyperplasia without lower urinary tract symptoms    PCO (posterior capsular opacification), left    Chronic combined systolic and diastolic congestive heart failure    Deep vein thrombosis (DVT) of lower extremity    Chronic venous insufficiency of lower extremity    Morbid obesity with BMI of 40.0-44.9, adult    Acute on chronic renal failure    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    LBBB (left bundle branch block)    Lambda light chain disease    Cellulitis of extremity    Head injury    Dizziness    Chronic anticoagulation    Fall    Hypotension due to hypovolemia    Severe obesity (BMI >= 40)    Orthostatic  "hypotension    ANGELITO (acute kidney injury)    Kidney transplanted    Diarrhea of presumed infectious origin    Syncope and collapse    Dehydration    Cytomegalic inclusion virus hepatitis    Chronic cough     Review of patient's allergies indicates:   Allergen Reactions    Lisinopril Other (See Comments)     Other reaction(s):  cough    Actos  [pioglitazone] Other (See Comments)     Other reaction(s): CHF    Metformin Other (See Comments)     Other reaction(s): renal insuff  Other reaction(s): CHF       The following were reviewed at this visit: active problem list, medication list, allergies, family history, social history, and health maintenance.    Medications:  Current Outpatient Medications on File Prior to Visit   Medication Sig Dispense Refill    apixaban (ELIQUIS) 5 mg Tab Take 1 tablet (5 mg total) by mouth 2 (two) times daily. 60 tablet 6    aspirin (ECOTRIN) 81 MG EC tablet Take 1 tablet by mouth Daily.       BD ULTRA-FINE MINI PEN NEEDLE 31 gauge x 3/16" Ndle Use to inject insulin as needed up to 4 times daily 100 each 3    blood sugar diagnostic Strp Use to test four times per day 150 strip 11    blood sugar diagnostic Strp 100 each by Misc.(Non-Drug; Combo Route) route 4 (four) times daily before meals and nightly. 100 each 1    blood-glucose meter (FREESTYLE LITE METER) kit 1 each 4 (four) times daily. 1 each 0    calcitRIOL (ROCALTROL) 0.25 MCG Cap Take 1 capsule (0.25 mcg total) by mouth once daily. 30 capsule 11    famotidine (PEPCID) 20 MG tablet TAKE ONE TABLET BY MOUTH EVERY EVENING 30 tablet 11    fish oil-omega-3 fatty acids 300-1,000 mg capsule Take 1 g by mouth once daily.      furosemide (LASIX) 80 MG tablet Take one-half tablet (40 mg) by mouth 2 (two) times daily. 30 tablet 11    glimepiride (AMARYL) 2 MG tablet Take 1 tablet (2 mg total) by mouth before breakfast. 90 tablet 3    insulin (LANTUS SOLOSTAR U-100 INSULIN) glargine 100 units/mL SubQ pen Inject 35 Units into the skin 2 (two) " "times daily. 30 mL 0    insulin aspart U-100 (NOVOLOG FLEXPEN U-100 INSULIN) 100 unit/mL (3 mL) InPn pen Inject 25 Units into the skin 3 (three) times daily with meals. 30 mL 2    ketoconazole (NIZORAL) 200 mg Tab Take HALF A tablet (100 mg total) by mouth once daily. 15 tablet 9    lancets (MICROLET LANCET) Misc 100 lancets by Misc.(Non-Drug; Combo Route) route 4 (four) times daily before meals and nightly. 100 each 11    magnesium oxide (MAG-OX) 400 mg (241.3 mg magnesium) tablet Take 1 tablet (400 mg total) by mouth once daily. 90 tablet 1    metOLazone (ZAROXOLYN) 2.5 MG tablet Take 1 tablet by mouth on Monday and Friday as directed 30 tablet 11    metoprolol succinate (TOPROL-XL) 25 MG 24 hr tablet Take 1 tablet (25 mg total) by mouth once daily. 30 tablet 11    montelukast (SINGULAIR) 10 mg tablet Take 1 tablet (10 mg total) by mouth every evening. 30 tablet 1    multivitamin (THERAGRAN) tablet Take 1 tablet by mouth once daily.      mupirocin (BACTROBAN) 2 % ointment Apply topically 3 (three) times daily. Use as directed on the leg wound. 22 g 1    mycophenolate (CELLCEPT) 250 mg Cap Take 3 capsules (750 mg total) by mouth 2 (two) times daily. 180 capsule 11    ondansetron (ZOFRAN) 4 MG tablet Take 1 tablet (4 mg total) by mouth every 6 (six) hours. 30 tablet 0    pen needle, diabetic (BD INSULIN PEN NEEDLE UF SHORT) 31 gauge x 5/16" Ndle USE TO INJECT INSULIN TWICE A  each 5    potassium chloride SA (K-DUR,KLOR-CON) 20 MEQ tablet Take 2 tablets (40 mEq total) by mouth once daily. 60 tablet 3    predniSONE (DELTASONE) 5 MG tablet Take 1 tablet (5 mg total) by mouth once daily. 30 tablet 11    promethazine-dextromethorphan (PROMETHAZINE-DM) 6.25-15 mg/5 mL Syrp Take by mouth.      rosuvastatin (CRESTOR) 40 MG Tab Take 1 tablet (40 mg total) by mouth once daily in the evening. 90 tablet 1    sacubitriL-valsartan (ENTRESTO)  mg per tablet Take 1 tablet by mouth 2 (two) times daily. 60 tablet 3    " tacrolimus (PROGRAF) 1 MG Cap Take 4 capsules (4 mg total) by mouth every 12 (twelve) hours. 240 capsule 11    tamsulosin (FLOMAX) 0.4 mg Cap Take 1 capsule (0.4 mg total) by mouth once daily. 30 capsule 11    triamcinolone acetonide 0.1% (KENALOG) 0.1 % ointment Apply topically 2 (two) times daily. Use on bilateral lower legs. 454 g 1    valGANciclovir (VALCYTE) 450 mg Tab Take 1 tablet (450 mg total) by mouth once daily. for 21 days 21 tablet 0     Current Facility-Administered Medications on File Prior to Visit   Medication Dose Route Frequency Provider Last Rate Last Admin    acetaminophen tablet 650 mg  650 mg Oral Once PRN Sal Lopez MD           Medications have been reviewed and reconciled with patient at this visit.  Barriers to medications reviewed with patient.    Adverse reactions to current medications reviewed with patient..    Over the counter medications reviewed and reconciled with patient.    Exam:  Wt Readings from Last 3 Encounters:   01/27/23 119.7 kg (263 lb 14.3 oz)   01/17/23 123.4 kg (272 lb)   01/04/23 123.4 kg (272 lb 0.8 oz)     Temp Readings from Last 3 Encounters:   01/27/23 97.8 °F (36.6 °C)   12/28/22 97.6 °F (36.4 °C) (Oral)   12/10/22 97.7 °F (36.5 °C) (Oral)     BP Readings from Last 3 Encounters:   01/27/23 106/72   01/04/23 124/60   12/28/22 (!) 98/50     Pulse Readings from Last 3 Encounters:   01/27/23 110   01/04/23 98   12/28/22 103     Body mass index is 41.33 kg/m².      Review of Systems   Constitutional:  Negative for fever.   Respiratory:  Positive for cough and shortness of breath. Negative for wheezing.    Cardiovascular:  Positive for chest pain and leg swelling.   Musculoskeletal:  Positive for joint pain, myalgias and neck pain. Negative for falls.   Physical Exam  Vitals and nursing note reviewed.   Constitutional:       General: He is not in acute distress.     Appearance: He is well-developed. He is morbidly obese. He is not diaphoretic.   HENT:      Head:  Normocephalic and atraumatic.      Right Ear: External ear normal.      Left Ear: External ear normal.   Eyes:      General:         Right eye: No discharge.         Left eye: No discharge.      Conjunctiva/sclera: Conjunctivae normal.      Pupils: Pupils are equal, round, and reactive to light.   Cardiovascular:      Rate and Rhythm: Normal rate and regular rhythm.      Heart sounds: Normal heart sounds. No murmur heard.  Pulmonary:      Effort: Pulmonary effort is normal. No respiratory distress.      Breath sounds: Normal breath sounds. No wheezing.   Chest:       Musculoskeletal:      Right shoulder: Tenderness present. Decreased range of motion.      Left shoulder: Tenderness present. Decreased range of motion.      Cervical back: Tenderness present. Pain with movement present. Decreased range of motion.   Neurological:      Mental Status: He is alert.       Laboratory Reviewed ({Yes)  Lab Results   Component Value Date    WBC 2.54 (L) 12/28/2022    HGB 8.6 (L) 12/28/2022    HCT 30.0 (L) 12/28/2022     12/28/2022    CHOL 183 09/19/2022    TRIG 113 09/19/2022    HDL 46 09/19/2022    ALT 11 12/28/2022    AST 14 12/28/2022     12/28/2022    K 3.9 12/28/2022     12/28/2022    CREATININE 2.4 (H) 12/28/2022    BUN 38 (H) 12/28/2022    CO2 31 (H) 12/28/2022    TSH 0.999 03/11/2022    PSA 0.33 10/18/2021    INR 1.0 06/18/2015    HGBA1C 7.1 (H) 12/09/2022     Mitch was seen today for muscle pain.    Diagnoses and all orders for this visit:    Chest pain, unspecified type  -     IN OFFICE EKG 12-LEAD (to Muse)  -     TROPONIN I; Future  -     CK-MB; Future  -     D-DIMER, QUANTITATIVE; Future  -     B-TYPE NATRIURETIC PEPTIDE; Future  -     COMPREHENSIVE METABOLIC PANEL; Future    Hypertension associated with stage 3 chronic kidney disease due to type 2 diabetes mellitus    Morbid obesity with BMI of 40.0-44.9, adult    Chronic combined systolic and diastolic CHF (congestive heart failure)    LBBB  (left bundle branch block)  -     X-Ray Chest PA And Lateral; Future    SOB (shortness of breath)  -     X-Ray Chest PA And Lateral; Future  -     TROPONIN I; Future  -     CK-MB; Future  -     D-DIMER, QUANTITATIVE; Future  -     B-TYPE NATRIURETIC PEPTIDE; Future  -     COMPREHENSIVE METABOLIC PANEL; Future    MARION (obstructive sleep apnea)    Chronic cough  -     promethazine-dextromethorphan (PROMETHAZINE-DM) 6.25-15 mg/5 mL Syrp; Take 5 mLs by mouth every 6 (six) hours as needed (cough).    Cervical radiculopathy  -     diclofenac sodium (VOLTAREN) 1 % Gel; Apply 2 g topically 3 (three) times daily.  -     X-Ray Cervical Spine Complete 5 view; Future    Kidney transplanted    Chronic anticoagulation  Takes Eliquis 5 mg BID     Chronic immunosuppression with Prograf, MMF and prednisone      Had sleep apnea test on last week  Was told that he will need a CPAP  Has a chronic cough  Has seen pulm prior     Previous EKGs showed LBBB     He has been watching his salt intake  Not doing daily weights  Last time he was at the doctor  he was 272  Today he was 263    Will order labs and CXR for CP/SOB    Neck pain  Pt did not want to take additional meds  Will try Voltaren gel  Neck stretches  OTC pain patches and use a heating pad       Care Plan/Goals: Reviewed    Goals    None         Follow up: No follow-ups on file.    After visit summary was printed and given to patient upon discharge today.  Patient goals and care plan are included in After Visit Summary.

## 2023-01-30 ENCOUNTER — HOSPITAL ENCOUNTER (EMERGENCY)
Facility: HOSPITAL | Age: 68
Discharge: HOME | End: 2023-01-30
Attending: EMERGENCY MEDICINE
Payer: COMMERCIAL

## 2023-01-30 VITALS
TEMPERATURE: 98 F | WEIGHT: 258 LBS | HEART RATE: 96 BPM | HEIGHT: 67 IN | DIASTOLIC BLOOD PRESSURE: 64 MMHG | BODY MASS INDEX: 40.49 KG/M2 | OXYGEN SATURATION: 100 % | SYSTOLIC BLOOD PRESSURE: 136 MMHG | RESPIRATION RATE: 17 BRPM

## 2023-01-30 DIAGNOSIS — M54.9 ARTHRALGIA OF BACK: ICD-10-CM

## 2023-01-30 DIAGNOSIS — R89.9 ELEVATED LABORATORY TEST RESULT: Primary | ICD-10-CM

## 2023-01-30 DIAGNOSIS — N18.4 CKD (CHRONIC KIDNEY DISEASE) STAGE 4, GFR 15-29 ML/MIN: ICD-10-CM

## 2023-01-30 PROCEDURE — 99284 EMERGENCY DEPT VISIT MOD MDM: CPT | Mod: 25

## 2023-01-30 NOTE — ED PROVIDER NOTES
SCRIBE #1 NOTE: I, Dora Cabral, am scribing for, and in the presence of, Gemma Bright MD. I have scribed the entire note.       History     Chief Complaint   Patient presents with    Neck Pain     Pt. Was send by  For a CT scan to rule out a Blood clot. He has been having left side neck pain and mid back pain.      Review of patient's allergies indicates:   Allergen Reactions    Lisinopril Other (See Comments)     Other reaction(s):  cough    Actos  [pioglitazone] Other (See Comments)     Other reaction(s): CHF    Metformin Other (See Comments)     Other reaction(s): renal insuff  Other reaction(s): CHF         History of Present Illness     HPI    1/30/2023, 10:32 AM  History obtained from the patient      History of Present Illness: Mitch Whittaker is a 67 y.o. male patient with a PMHx of anemia, CAD, CHF, DM, CKD, HTN, HPTH, and sleep apnea who presents to the Emergency Department for evaluation of an abnormal lab. Pt has been having left shoulder pain, CP, and back pain for some time and saw his PCP on 1/27. Blood work was done and his D-dimer was elevated, so he was instructed to come to the ED to be evaluated for a possible blood clot. Symptoms are constant and moderate in severity. No mitigating or exacerbating factors reported. Patient denies any SOB, fever, chills, nausea, vomiting, and all other sxs at this time. No further complaints or concerns at this time.       Arrival mode: Personal vehicle     PCP: Alix Kowalski DO        Past Medical History:  Past Medical History:   Diagnosis Date    Acquired renal cyst of left kidney     Anemia associated with chronic renal failure     CAD (coronary artery disease)     nonobstructive [facility] 9/14    CHF (congestive heart failure)     Chronic immunosuppression with Prograf and MMF 06/18/2015    Chronic venous insufficiency of lower extremity     CKD (chronic kidney disease) stage 3, GFR 30-59 ml/min     Diabetic retinopathy     DM (diabetes mellitus), type 2  with complications 1994    Edema     End stage kidney disease     s/p transplant, doing well    Gallbladder polyp     Heart failure, diastolic, due to HTN     Hemodialysis status     off since transplant    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 RNA NEGATIVE 2015, ,     History of colon polyps     HPTH (hyperparathyroidism)     Hyperlipidemia     Hypertension associated with stage 3 chronic kidney disease due to type 2 diabetes mellitus     LBBB (left bundle branch block) 2021    Morbid obesity with BMI of 45.0-49.9, adult     PCO (posterior capsular opacification), left 2019    Proteinuria     resolved s/p transplant    S/P kidney transplant     Sleep apnea     Type 2 diabetes, uncontrolled, with retinopathy     Type II diabetes mellitus with renal manifestations        Past Surgical History:  Past Surgical History:   Procedure Laterality Date    CARDIAC CATHETERIZATION      normal coronary    COLONOSCOPY N/A 2018    Procedure: COLONOSCOPY;  Surgeon: Chava Ronquillo MD;  Location: [facility] ENDO;  Service: Endoscopy;  Laterality: N/A;    COLONOSCOPY N/A 2022    Procedure: COLONOSCOPY;  Surgeon: Alix Puente MD;  Location: [facility] ENDO;  Service: Endoscopy;  Laterality: N/A;    KIDNEY TRANSPLANT      RETINAL LASER PROCEDURE           Family History:  Family History   Problem Relation Age of Onset    Diabetes Mother     Hypertension Mother     Heart failure Mother     Kidney disease Sister         ESRD    Diabetes Sister     Diabetes Maternal Grandmother     Cancer Neg Hx        Social History:  Social History     Tobacco Use    Smoking status: Former     Types: Cigarettes     Quit date: 2013     Years since quittin.6     Passive exposure: Past    Smokeless tobacco: Former     Quit date: 2013    Tobacco comments:     used marijuana since 0154-1275, stopped after started dialysis   Substance and Sexual Activity    Alcohol use: No      Alcohol/week: 0.0 standard drinks    Drug use: No     Comment:      Sexual activity: Never        Review of Systems     Review of Systems   Constitutional:  Negative for chills and fever.   HENT:  Negative for sore throat.    Respiratory:  Negative for shortness of breath.    Cardiovascular:  Positive for chest pain.   Gastrointestinal:  Negative for nausea and vomiting.   Genitourinary:  Negative for dysuria.   Musculoskeletal:  Positive for arthralgias (L shoulder) and back pain.   Skin:  Negative for rash.   Neurological:  Negative for weakness.   Hematological:  Does not bruise/bleed easily.   All other systems reviewed and are negative.     Physical Exam     Initial Vitals [01/30/23 0916]   BP Pulse Resp Temp SpO2   117/73 100 18 97.8 °F (36.6 °C) 100 %      MAP       --          Physical Exam  Nursing Notes and Vital Signs Reviewed.  Constitutional: Patient is in no acute distress. Well-developed and well-nourished.  Head: Atraumatic. Normocephalic.  Eyes: PERRL. EOM intact. Conjunctivae are not pale. No scleral icterus.  ENT: Mucous membranes are moist. Oropharynx is clear and symmetric.    Neck: Supple. Full ROM. No lymphadenopathy.  Cardiovascular: Regular rate. Regular rhythm. No murmurs, rubs, or gallops. Distal pulses are 2+ and symmetric.  Pulmonary/Chest: No respiratory distress. Clear to auscultation bilaterally. No wheezing or rales.  Abdominal: Soft and non-distended.  There is no tenderness.  No rebound, guarding, or rigidity. Good bowel sounds.  Genitourinary: No CVA tenderness  Musculoskeletal: Moves all extremities. No obvious deformities. No edema. No calf tenderness.  Skin: Warm and dry.  Neurological:  Alert, awake, and appropriate.  Normal speech.  No acute focal neurological deficits are appreciated.  Psychiatric: Normal affect. Good eye contact. Appropriate in content.     ED Course   Procedures  ED Vital Signs:  Vitals:    01/30/23 0916 01/30/23 1027 01/30/23 1029 01/30/23 1102   BP:  "117/73 120/63  (!) 142/73   Pulse: 100 100 99 97   Resp: 18      Temp: 97.8 °F (36.6 °C)      TempSrc: Oral      SpO2: 100%      Weight: 117 kg (258 lb)      Height: 5' 7" (1.702 m)       01/30/23 1155 01/30/23 1202 01/30/23 1232   BP: 131/65 130/61 136/64   Pulse: 98 96 96   Resp: 17     Temp:      TempSrc:      SpO2: 100% 100% 100%   Weight:      Height:            Imaging Results:  Imaging Results              NM Lung Scan Ventilation Perfusion (Final result)  Result time 01/30/23 12:34:42      Final result by YULIA Hernandez Sr., MD (01/30/23 12:34:42)                   Impression:      Normal study. No evidence of pulmonary embolism.      Electronically signed by: Geo Hernandez MD  Date:    01/30/2023  Time:    12:34               Narrative:    EXAMINATION:  NM LUNG VENTILATION AND PERFUSION IMAGING    CLINICAL HISTORY:  Pulmonary embolism (PE) suspected, positive D-dimer;    TECHNIQUE:  After the administration of 1 mCi of technetium  labeled DTPA, scintigraphic images of the lungs were obtained. After the administration of 5.4 mCi of technetium 99m labeled MAA, scintigraphic images of the lungs were obtained.    COMPARISON:  Comparison was made to a chest x-ray performed on 01/27/2023.    FINDINGS:  The ventilation and perfusion images are normal in appearance.                                                The Emergency Provider reviewed the vital signs and test results, which are outlined above.     ED Discussion       12:38 PM: Reassessed pt at this time. Discussed with pt all pertinent ED information and results. Discussed pt dx and plan of tx. Gave pt all f/u and return to the ED instructions. All questions and concerns were addressed at this time. Pt expresses understanding of information and instructions, and is comfortable with plan to discharge. Pt is stable for discharge.    I discussed with patient and/or family/caretaker that evaluation in the ED does not suggest any emergent or life " threatening medical conditions requiring immediate intervention beyond what was provided in the ED, and I believe patient is safe for discharge.  Regardless, an unremarkable evaluation in the ED does not preclude the development or presence of a serious of life threatening condition. As such, patient was instructed to return immediately for any worsening or change in current symptoms.         Medical Decision Making:   History:   Old Medical Records: I decided to obtain old medical records.  Old Records Summarized: records from clinic visits.       <> Summary of Records: Reviewed records from 1/27: Pt had routine blood work done and X-rays of his chest and neck. Reviewed the results and patient's labs appear at baseline with slightly worsened kidney function. Patient's troponin is mildly elevated but lower than his baseline. D-dimer was elevated.   Initial Assessment:   Patient seen by pcp on 1/27 for atypical chest pain/upper back pain, had lab work done, xray of chest and neck, all results reviewed and noted.   Clinical Tests:   Radiological Study: Ordered and Reviewed  ED Management:  Patient sent to the ER for elevated d dimer, d dimer over 1 noted on outpatient lab work done on 1/27, he has been c/o right upper back pain, exam normal, vital signs reviewed and normal, outpatient lab work and imaging reviewed, abnormalities noted, V/Q ordered due to patient's CKD and inability to receive contrast, V/Q results reviewed and noted to be negative.  Patient stable for discharge home with further follow up by primary care.  No further imaging or lab work indicated at this time.          ED Medication(s):  Medications - No data to display    Discharge Medication List as of 1/30/2023 12:40 PM           Follow-up Information       Alix Kowalski DO. Schedule an appointment as soon as possible for a visit in 2 days.    Specialty: Internal Medicine  Why: Return to the Emergency Room, If symptoms worsen  Contact  information:  [id number] [place] [address] [address] Marlena HERNANDEZ [address]  [phone]                                 Scribe Attestation:   Scribe #1: I performed the above scribed service and the documentation accurately describes the services I performed. I attest to the accuracy of the note.     Attending:   Physician Attestation Statement for Scribe #1: I, Gemma Bright MD, personally performed the services described in this documentation, as scribed by Dora Cabral, in my presence, and it is both accurate and complete.           Clinical Impression       ICD-10-CM ICD-9-CM   1. Elevated laboratory test result  R89.9 796.4   2. CKD (chronic kidney disease) stage 4, GFR 15-29 ml/min  N18.4 585.4   3. Arthralgia of back  M54.9 724.5       Disposition:   Disposition: Discharged  Condition: Stable       Gemma Bright MD  01/30/23 1910

## 2023-02-01 RX ORDER — TAMSULOSIN HYDROCHLORIDE 0.4 MG/1
1 CAPSULE ORAL DAILY
Qty: 30 CAPSULE | Refills: 11 | Status: SHIPPED | OUTPATIENT
Start: 2023-02-01 | End: 2024-01-07 | Stop reason: SDUPTHER

## 2023-02-01 RX ORDER — METOPROLOL SUCCINATE 25 MG/1
25 TABLET, EXTENDED RELEASE ORAL DAILY
Qty: 30 TABLET | Refills: 11 | Status: SHIPPED | OUTPATIENT
Start: 2023-02-01 | End: 2023-02-15 | Stop reason: SDUPTHER

## 2023-02-01 NOTE — TELEPHONE ENCOUNTER
No new care gaps identified.  [facility] Embedded Care Gaps. Reference number: [id number]. 2/01/2023   5:09:04 AM CST

## 2023-02-02 ENCOUNTER — TELEPHONE (OUTPATIENT)
Dept: NEPHROLOGY | Facility: CLINIC | Age: 68
End: 2023-02-02
Payer: COMMERCIAL

## 2023-02-02 NOTE — TELEPHONE ENCOUNTER
----- Message from Annika Pratt LPN sent at 2/1/2023  5:01 PM CST -----  Regarding: FW: Missed Visit    ----- Message -----  From: Rosio Conley LPN  Sent: 2/1/2023   4:56 PM CST  To: [facility] Staff  Subject: Missed Visit                                     Patient is at hospital with his wife. She is having surgery Monday. He needs to reschedule. Please give him a call at [phone]    Rosio MEDINA

## 2023-02-02 NOTE — TELEPHONE ENCOUNTER
L/m that we can get him in march 3rd with labs the week before. Please call to confirm this' 2/2/23/sf

## 2023-02-06 RX ORDER — LANOLIN ALCOHOL/MO/W.PET/CERES
400 CREAM (GRAM) TOPICAL DAILY
Qty: 90 TABLET | Refills: 1 | Status: CANCELLED | OUTPATIENT
Start: 2023-02-06

## 2023-02-09 PROCEDURE — 95811 POLYSOM 6/>YRS CPAP 4/> PARM: CPT | Mod: 26,,, | Performed by: PSYCHOLOGIST

## 2023-02-09 PROCEDURE — 95811 PR POLYSOMNOGRAPHY W/CPAP: ICD-10-PCS | Mod: 26,,, | Performed by: PSYCHOLOGIST

## 2023-02-09 NOTE — PROCEDURES
"Patient Name: Mitch Whittaker    Date of Report: 23  Study Date:  2023  [facility] No.: [id number]    : 1955   Time of Study:  09:15:36 PM - 05:04:33 AM   Sex:  Male   Age:  67 year   Weight:  277.0 lbs    Height:  5' 7"   Type of Study:  Split Night    REASONS FOR REFERRAL: Mr. Whittaker is a 67 year year old male, referred for diagnostic polysomnography by Aislinn Hdz PA-C, based on the patient's reported history of MARION (last sleep study in ), but has not used CPAP since .  Currently, he reports loud snoring, observed respiratory pauses in sleep, unrefreshing sleep and daytime hypersomnolence.  His Lawler Sleepiness Scale score was 15, clinically significant, and his STOP-BANG score was 8, high risk of MARION.  Dr. Alix Kowalski was the originating referring physician, and is the patient's primary care physician.     STUDY PARAMETERS: This diagnostic study involved analysis of the patient's sleep pattern while breathing unassisted. 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  The diagnostic polysomnography revealed a " severe obstructive sleep apnea / hypopnea syndrome (A + H Index = 39.0 events / hr asleep with 8.1 respiratory event - related arousals / hr asleep and no RERAs (respiratory effort -  related arousals).  The mean SpO2 value was 93.3 %, moderate, minimum oxygen saturation during sleep was 83.0 %, and the maximum  waking baseline SpO2 was 99.0 %.  Sporadic, mild snoring was noted.     CPAP was initiated at 12:59:57 AM.  The CPAP titration polysomnography revealed that 7 cm H2O pressure (C - Flex 2, heated humidity), the most effective pressure tested 30 min or more, was best, though not quite adequate, as it reduced the rate of respiratory events 81 % to A + H Index = 7.5 events / hr asleep in 0.68 hrs sleep, but with no REM sleep. No pressure was either fully tested or successful (e.g., 10 cm A + H Index = 3.9, but with only 0.26 hr sleep and no REM sleep;  8 cm had 0.6 hrs sleep, and only 0.05 hrs REM sleep  with an A + H I = 12.2).  Snoring was virtually eliminated with CPAP, specifically, snoring was minimal throughout titration at 7 cm and above.  AutoCPAP 6 cm - 20 cm) could be tried if necessary.          The overall A + H Index was 12.1 / hr asleep, with no respiratory event - related arousals and no RERAs for the titration trial.  The mean SpO2 value was 94.4 % throughout the study, moderate, with a minimum oxygen saturation during sleep of 88.0 % and a maximum waking baseline SpO2 of 98.0 %. A medium Resmed AirFit  F30 full - face CPAP mask was used and was adequate,  but sleep during CPAP was markedly reduced, for 160 min at the beginning of the trial at 5 cm.  However, he slept adequately at the remaining pressures with a F20 (low contact) mask.  Please see PAP trial outcomes table, below.     PAP Device PAP Level O2 Level Time (min) TST (min) NREM (min) REM (min) OA# CA# MA# Hyp# AHI RERA RDI Min SpO2 SpO2 ?88 % (min) Ar. Index   - Off - 224.5 200.0 179.5 20.5 1 5 - 124 39.0 - 39.0 83.0  3.0 33.9   CPAP 5  - 159.0 9.5 9.5 0.0 - 1 - 6 44.2 - 44.2 90.0  0.0 31.6   CPAP 7 - 40.5 40.0 40.0 0.0 - 2 - 3 7.5 - 7.5 90.0  0.0 12.0   CPAP 8 - 29.5 29.5 26.0 3.5 - 1 - 5 12.2 - 12.2 88.0  0.0 18.3   CPAP 10 - 16.0 15.5 0.0 15.5 - - - 1 3.9 - 3.9 92.0  0.0 15.5     A repeat CPAP / BiPAP titration PSG is recommended,  but  only  after  successful habituation to CPAP  at home (wherein gradually increasing CPAP pressures are used for increasing amounts of time, initially while awake and occupied, and then while asleep).   Weight loss to the normal range is recommended as it can decrease respiratory events and snoring in overweight patients.  The following changes in sleep hygiene / sleep - related behavior are recommended after medical treatments are successful  Set time for sleep to number of hours of sleep needed for adequate daytime functioning (7.5 to 9.0 hrs / night).  Regular bedtimes and wake times, including weekends: Total sleep time / night should not be more than one hour more            than usual, and bedtime or wake time should not be more than one hour earlier or later than usual.    Do not attempt to make up lost sleep by extending sleep periods.    Avoid naps; none longer than 20 min or later than mid - afternoon.    SECONDARY TREATMENT RECOMMENDATIONS  Treat, or refer to [facility] if problems are not satisfactorily resolved by the above.  If  insomnia persists after treatments for medical sleep disorders have proven effective, recommend  follow - up inquiry regarding frequency, duration and nature of reported sleep loss, delayed sleep onset, poor sleep maintenance and involuntary early awakening (stress - related and / or conditioned psychophysiological insomnia;  r/o  insufficient sleep syndrome) and referral for behavioral treatment of insomnia, as indicated.    Consider a referral for a dental examination and possible dental splint for sleep bruxism.    Consider behavioral and cognitive / behavioral treatments for  stress and nightmares; sleep bruxism should be expected to improve.   Follow - up inquiry regarding possible sleep - related gastroesophageal reflux (please see SDI).   Determine frequency and conditions of occurrence of sleep related hallucinations, e.g. narcolepsy, medications / drugs, stress.     See below for a complete interpretation of data from the polysomnography and Sleep Disorders Inventory.     Thank you for referring this patient to the [facility].      Noel Bui, Ph.D., ABPP; Diplomate, American Board of Sleep Medicine

## 2023-02-10 ENCOUNTER — HOSPITAL ENCOUNTER (OUTPATIENT)
Dept: RADIOLOGY | Facility: HOSPITAL | Age: 68
Discharge: HOME | End: 2023-02-10
Payer: COMMERCIAL

## 2023-02-10 ENCOUNTER — TELEPHONE (OUTPATIENT)
Dept: INTERNAL MEDICINE | Facility: CLINIC | Age: 68
End: 2023-02-10
Payer: COMMERCIAL

## 2023-02-10 DIAGNOSIS — R07.9 CHEST PAIN, UNSPECIFIED TYPE: ICD-10-CM

## 2023-02-10 DIAGNOSIS — R79.89 POSITIVE D DIMER: ICD-10-CM

## 2023-02-10 PROCEDURE — 71046 XR CHEST PA AND LATERAL: ICD-10-PCS | Mod: 26,,, | Performed by: RADIOLOGY

## 2023-02-10 PROCEDURE — 78582 LUNG VENTILAT&PERFUS IMAGING: CPT | Mod: 26,,, | Performed by: RADIOLOGY

## 2023-02-10 PROCEDURE — 71046 X-RAY EXAM CHEST 2 VIEWS: CPT | Mod: TC

## 2023-02-10 PROCEDURE — A9567 TECHNETIUM TC-99M AEROSOL: HCPCS

## 2023-02-10 PROCEDURE — 78582 NM LUNG VENTILATION AND PERFUSION IMAGING: ICD-10-PCS | Mod: 26,,, | Performed by: RADIOLOGY

## 2023-02-10 PROCEDURE — 71046 X-RAY EXAM CHEST 2 VIEWS: CPT | Mod: 26,,, | Performed by: RADIOLOGY

## 2023-02-14 ENCOUNTER — PATIENT MESSAGE (OUTPATIENT)
Dept: PHARMACY | Facility: CLINIC | Age: 68
End: 2023-02-14
Payer: COMMERCIAL

## 2023-02-15 ENCOUNTER — OFFICE VISIT (OUTPATIENT)
Dept: CARDIOLOGY | Facility: CLINIC | Age: 68
End: 2023-02-15
Payer: COMMERCIAL

## 2023-02-15 VITALS
DIASTOLIC BLOOD PRESSURE: 68 MMHG | OXYGEN SATURATION: 95 % | WEIGHT: 267.44 LBS | HEART RATE: 102 BPM | SYSTOLIC BLOOD PRESSURE: 134 MMHG | BODY MASS INDEX: 41.88 KG/M2

## 2023-02-15 DIAGNOSIS — I25.10 CORONARY ARTERY DISEASE INVOLVING NATIVE CORONARY ARTERY OF NATIVE HEART WITHOUT ANGINA PECTORIS: ICD-10-CM

## 2023-02-15 DIAGNOSIS — E66.01 CLASS 3 SEVERE OBESITY DUE TO EXCESS CALORIES WITH SERIOUS COMORBIDITY AND BODY MASS INDEX (BMI) OF 40.0 TO 44.9 IN ADULT: ICD-10-CM

## 2023-02-15 DIAGNOSIS — I50.42 CHRONIC COMBINED SYSTOLIC AND DIASTOLIC CONGESTIVE HEART FAILURE: Primary | ICD-10-CM

## 2023-02-15 DIAGNOSIS — Z86.718 HISTORY OF DVT (DEEP VEIN THROMBOSIS): ICD-10-CM

## 2023-02-15 DIAGNOSIS — I44.7 LBBB (LEFT BUNDLE BRANCH BLOCK): ICD-10-CM

## 2023-02-15 DIAGNOSIS — Z86.16 HISTORY OF COVID-19: ICD-10-CM

## 2023-02-15 DIAGNOSIS — I12.9 HYPERTENSION ASSOCIATED WITH STAGE 3 CHRONIC KIDNEY DISEASE DUE TO TYPE 2 DIABETES MELLITUS: ICD-10-CM

## 2023-02-15 DIAGNOSIS — N18.32 STAGE 3B CHRONIC KIDNEY DISEASE: ICD-10-CM

## 2023-02-15 DIAGNOSIS — I25.118 CORONARY ARTERY DISEASE OF NATIVE ARTERY OF NATIVE HEART WITH STABLE ANGINA PECTORIS: ICD-10-CM

## 2023-02-15 DIAGNOSIS — E11.22 HYPERTENSION ASSOCIATED WITH STAGE 3 CHRONIC KIDNEY DISEASE DUE TO TYPE 2 DIABETES MELLITUS: ICD-10-CM

## 2023-02-15 DIAGNOSIS — E11.21 TYPE 2 DIABETES MELLITUS WITH DIABETIC NEPHROPATHY, UNSPECIFIED WHETHER LONG TERM INSULIN USE: ICD-10-CM

## 2023-02-15 DIAGNOSIS — N18.30 STAGE 3 CHRONIC KIDNEY DISEASE, UNSPECIFIED WHETHER STAGE 3A OR 3B CKD: ICD-10-CM

## 2023-02-15 DIAGNOSIS — E11.8 DM (DIABETES MELLITUS), TYPE 2 WITH COMPLICATIONS: ICD-10-CM

## 2023-02-15 DIAGNOSIS — N18.30 HYPERTENSION ASSOCIATED WITH STAGE 3 CHRONIC KIDNEY DISEASE DUE TO TYPE 2 DIABETES MELLITUS: ICD-10-CM

## 2023-02-15 PROCEDURE — 3075F PR MOST RECENT SYSTOLIC BLOOD PRESS GE 130-139MM HG: ICD-10-PCS | Mod: CPTII,S$GLB,, | Performed by: INTERNAL MEDICINE

## 2023-02-15 PROCEDURE — 99999 PR PBB SHADOW E&M-EST. PATIENT-LVL V: ICD-10-PCS | Mod: PBBFAC,,, | Performed by: INTERNAL MEDICINE

## 2023-02-15 PROCEDURE — 3078F PR MOST RECENT DIASTOLIC BLOOD PRESSURE < 80 MM HG: ICD-10-PCS | Mod: CPTII,S$GLB,, | Performed by: INTERNAL MEDICINE

## 2023-02-15 PROCEDURE — 1160F RVW MEDS BY RX/DR IN RCRD: CPT | Mod: CPTII,S$GLB,, | Performed by: INTERNAL MEDICINE

## 2023-02-15 PROCEDURE — 1101F PR PT FALLS ASSESS DOC 0-1 FALLS W/OUT INJ PAST YR: ICD-10-PCS | Mod: CPTII,S$GLB,, | Performed by: INTERNAL MEDICINE

## 2023-02-15 PROCEDURE — 1160F PR REVIEW ALL MEDS BY PRESCRIBER/CLIN PHARMACIST DOCUMENTED: ICD-10-PCS | Mod: CPTII,S$GLB,, | Performed by: INTERNAL MEDICINE

## 2023-02-15 PROCEDURE — 1101F PT FALLS ASSESS-DOCD LE1/YR: CPT | Mod: CPTII,S$GLB,, | Performed by: INTERNAL MEDICINE

## 2023-02-15 PROCEDURE — 4010F PR ACE/ARB THEARPY RXD/TAKEN: ICD-10-PCS | Mod: CPTII,S$GLB,, | Performed by: INTERNAL MEDICINE

## 2023-02-15 PROCEDURE — 99214 PR OFFICE/OUTPT VISIT, EST, LEVL IV, 30-39 MIN: ICD-10-PCS | Mod: S$GLB,,, | Performed by: INTERNAL MEDICINE

## 2023-02-15 PROCEDURE — 3008F BODY MASS INDEX DOCD: CPT | Mod: CPTII,S$GLB,, | Performed by: INTERNAL MEDICINE

## 2023-02-15 PROCEDURE — 1159F PR MEDICATION LIST DOCUMENTED IN MEDICAL RECORD: ICD-10-PCS | Mod: CPTII,S$GLB,, | Performed by: INTERNAL MEDICINE

## 2023-02-15 PROCEDURE — 4010F ACE/ARB THERAPY RXD/TAKEN: CPT | Mod: CPTII,S$GLB,, | Performed by: INTERNAL MEDICINE

## 2023-02-15 PROCEDURE — 1126F PR PAIN SEVERITY QUANTIFIED, NO PAIN PRESENT: ICD-10-PCS | Mod: CPTII,S$GLB,, | Performed by: INTERNAL MEDICINE

## 2023-02-15 PROCEDURE — 3288F PR FALLS RISK ASSESSMENT DOCUMENTED: ICD-10-PCS | Mod: CPTII,S$GLB,, | Performed by: INTERNAL MEDICINE

## 2023-02-15 PROCEDURE — 1126F AMNT PAIN NOTED NONE PRSNT: CPT | Mod: CPTII,S$GLB,, | Performed by: INTERNAL MEDICINE

## 2023-02-15 PROCEDURE — 99999 PR PBB SHADOW E&M-EST. PATIENT-LVL V: CPT | Mod: PBBFAC,,, | Performed by: INTERNAL MEDICINE

## 2023-02-15 PROCEDURE — 3288F FALL RISK ASSESSMENT DOCD: CPT | Mod: CPTII,S$GLB,, | Performed by: INTERNAL MEDICINE

## 2023-02-15 PROCEDURE — 3075F SYST BP GE 130 - 139MM HG: CPT | Mod: CPTII,S$GLB,, | Performed by: INTERNAL MEDICINE

## 2023-02-15 PROCEDURE — 3008F PR BODY MASS INDEX (BMI) DOCUMENTED: ICD-10-PCS | Mod: CPTII,S$GLB,, | Performed by: INTERNAL MEDICINE

## 2023-02-15 PROCEDURE — 1159F MED LIST DOCD IN RCRD: CPT | Mod: CPTII,S$GLB,, | Performed by: INTERNAL MEDICINE

## 2023-02-15 PROCEDURE — 3078F DIAST BP <80 MM HG: CPT | Mod: CPTII,S$GLB,, | Performed by: INTERNAL MEDICINE

## 2023-02-15 PROCEDURE — 99214 OFFICE O/P EST MOD 30 MIN: CPT | Mod: S$GLB,,, | Performed by: INTERNAL MEDICINE

## 2023-02-15 RX ORDER — METOPROLOL SUCCINATE 25 MG/1
25 TABLET, EXTENDED RELEASE ORAL DAILY
Qty: 90 TABLET | Refills: 1 | Status: SHIPPED | OUTPATIENT
Start: 2023-02-15 | End: 2023-08-23 | Stop reason: SDUPTHER

## 2023-02-15 RX ORDER — POTASSIUM CHLORIDE 20 MEQ/1
40 TABLET, EXTENDED RELEASE ORAL DAILY
Qty: 180 TABLET | Refills: 1 | Status: SHIPPED | OUTPATIENT
Start: 2023-02-15 | End: 2023-09-17 | Stop reason: SDUPTHER

## 2023-02-15 RX ORDER — SACUBITRIL AND VALSARTAN 97; 103 MG/1; MG/1
1 TABLET, FILM COATED ORAL 2 TIMES DAILY
Qty: 180 TABLET | Refills: 1 | Status: SHIPPED | OUTPATIENT
Start: 2023-02-15 | End: 2023-08-27 | Stop reason: SDUPTHER

## 2023-02-15 NOTE — PROGRESS NOTES
Subjective:   Patient ID:  Mitch Whittaker is a 67 y.o. male who presents for cardiac consult of No chief complaint on file.      The patient came in today for cardiac consult of No chief complaint on file.      Mitch Whittaker is a 67 y.o. male with current medical conditions non obs CAD, h/o COVID 19, DVT on Eliquis, Obesity,  HFrEF 40-45%, CKD, DM2, MARION, HTN, HLD, ESRD s/p renal transplant presents for follow up CV eval.     3/15/19  Pt of Dr. Morris, last seen in clinic 2016. Pt presents after recent admission for CHF at [facility] -   Patient is a 63-year-old gentleman who presented to the hospital with shortness of breath. He was found to have pulmonary edema on chest x-ray and bilateral pedal edema. He was admitted for treatment of acute CHF. Echo showed reduced ejection fraction at 25-30%. He was treated with IV Lasix 80 mg twice daily that has been transitioned to Lasix p.o. Patient is not euvolemic and his creatinine is trending up for this reason I am cutting back on Lasix to 40 mg twice daily. Can follow-up with cardiology in 3 days. He sees one [facility]. He was seen by Dr. Omayra Esparza from [facility]. Recommendation is to start him on Entresto once his renal functions stabilize.Pt had edema and SOB while in bed and then went to hospital. Prior to admission he was on lasix PRN. He will see Dr. Pacheco for nephro eval, transplant nephro Dr. eDjesus.  Has been feeling good on lasix 40 BID, has been walking well overall. Does not have CPAP machine.     5/13/19  He was started on Entresto after last visit as renal function improved/stabilized. Has seen Dr. Mason recently started on CPAP again. Toprol dec to 25 mg due to hypotension. Discussed will repeat echo in 1 month to evaluate LV function, will refer for ICD if EF remains low. He woke up with right sided back pain. He has been using CPAP for about a little over a week. Has been doing well lately overall, BP well controlled, no STEELE/LE lately. PT has  mild forgetfulness but family at home has been taking care of him closely.     7/5/19  ECHO last month EF improved to 45-50%. Will not need ICD now. He has LLE pain x 4 days. He has been using Tylenol and ICY HOT for pain relief. Pain started L calf. Is taking lasix 40mg daily. Feels dizzy and lightheaded with standing at times.     7/24/19  Last week - DVT noted on u/s. Started Eliquis 10 BID x 7 days and Eliquis 5 BID after. NO bleeding issues lately with Eliquis, had minor blood after insulin shot which resolved with pressure. He has been walking more lately. Late June he had leg pain and was not walking much since, he has a habit of sitting down for a long period of time. He is on Eliquis 5 BID dosing now.     10/11/19  His recent LE u/s with non occlusive thrombus in PTV in left but no thrombus in POP vein on left. He still feels L leg swelling. No SOB. Has been using CPAP machine regularly. He feels dizzy at times and when he gets up more lightheaded, discussed needs to eval with urologist to stop flomax/proscar.     1/13/20  Last u/s with non occ thrombus L posterior Tib vein. He has been having back pain recently. He has intermittent pain when he gets up and walks around. He has started walking more and started to ride. He had had a bad sinus infection/URI and was given PCN and cough syrup.     7/15/20  Breathing overall stable but has this chronic dry cough. He has been cutting grass and felt like he had a heat stroke/exhaustion. He did not need to go to ER. His legs had some swelling but improved with rest/reclining. He has been painting and moving things around the house. No CP/SOB. He has gained weight recently as well.     1/8/2021  He has gained more weight since last visit. He has been eating too much lately. He got a new exercise bike. Discussed will refer to bariatric surgery.   ECG - sinus tachy, LAE, LBBB    7/16/21  BP and Hr stable. Weight 295 lbs. He had a fall a month ago trying to take out  " and scrape his knee. He has more STEELE and edema. He has been drinking more water he cannot help it at times.       Hospital Course:   12/7 admitted for atypical chest pain and angelito. CP resolved. Endorses constipation taking MOM. Reports "rabbit" -like stool. Also reports taking lasix daily and was instructed by PcP to taking QOD for chf.  H/o renal transplant. Reports compliance to meds. Last seen by primary nephrologist, Dr. Pacheco. States reason for renal failure from uncontrolled BP. Also with DM. Was previously on dialysis prior to transplant. Reports urination frequency and amount unchanged. Renal u/s with no significant change, no hydronephrosis. Cr closer to baseline, now 2.7. avoid nephrotoxic agents and resume immunosuppressants. Tac lvl 10.5  12/8   Renal function improved, to baseline. After discussing with nephrology, ok to d/c and follow up o/p with medication adjustment for tacrolimus.  Endorses constipation with associated left upper abdominal quad pain. Reports passing flatus. Advised to continue bowel regimen and try to avoid dehydration.  Lesions on leg d/t chronic venous stasis. Wound care as o/p with homehealth.  He also saw Dr. Hill for possible amyloid workup and treatment.     12/29/21  ECHO in July with mild dec EF 40%. Increased Entersto dose last visit. He was admitted for CP, ANGELITO. He feels well now, stable. Takes metoloazone daily but at night discussed to take in Am, and takes lasix 40 BID.     2/10/22  Study is negative for TTR amyloidosis with an uptake ratio of 1.03. Per Dr. Hill - I would recommend strain imaging on repeat echo  If Hematologist is concerned re: AL then heart biopsy indicated unless other less invasive measures demonstrate amyloidosis.    He had an episode of syncope last week while he got out of car, did not go to ER. He regained conc and was with daughter. Has been having headaches at times as well.     8/11/22  ECHO 2/2022 with EF 40%, normal RV " function, PASP 27 mmHg. He had ER eval in May for CP, ruled out and sent home. BP and HR well controlled. BMI 44 - 285 lbs. He has occ CP/SOB since having COVID in Jan. He saw Jaymie May a few months ago, stable overall, is on metolazone 2.5 mg on Tuesday and Fridays for now    2/15/23  ER eval last month - Patient sent to the ER for elevated d dimer, d dimer over 1 noted on outpatient lab work done on 1/27, he has been c/o right upper back pain, exam normal, vital signs reviewed and normal, outpatient lab work and imaging reviewed, abnormalities noted, V/Q ordered due to patient's CKD and inability to receive contrast, V/Q results reviewed and noted to be negative.  Patient stable for discharge home with further follow up by primary care.  No further imaging or lab work indicated at this time.      His wife recently had CABG at [facility], returned on Friday. BP and Hr well controlled. BMI 41 - 267. Pt had neck/back pain which radiated to R shoulder and had elevated D dimer which had neg V/Q.     Patient feels no PND, no palpitation,  no syncope, no CNS symptoms.    Patient has dec exercise tolerance.    Patient is compliant with medications.    Results for orders placed during the hospital encounter of 02/18/22    Echo    Interpretation Summary  · Concentric hypertrophy and mildly decreased systolic function.  · Mild tricuspid regurgitation.  · Mild left atrial enlargement.  · The estimated ejection fraction is 40%.  · Left ventricular diastolic dysfunction.  · There is abnormal septal wall motion consistent with left bundle branch block.  · With normal right ventricular systolic function.  · Normal central venous pressure (3 mmHg).  · The estimated PA systolic pressure is 27 mmHg.      Results for orders placed during the hospital encounter of 07/27/21    Echo    Interpretation Summary  · The left ventricle is normal in size with concentric hypertrophy and mildly decreased systolic function.  · The estimated ejection  fraction is 40%.  · Normal right ventricular size with normal right ventricular systolic function.  · Indeterminate left ventricular diastolic function.  · Moderate left atrial enlargement.      LE US 12/2019  Age Indeterminate non occlusive thrombus of the Posterior Tibial Vein.  CONCLUSIONS   Technically difficult study.   This document has been electronically    SIGNED BY: Fabiana Saleem MD On: 12/18/2019 17:18    RIGHT:  No evidence of Right lower extremity DVT.    LEFT:  Age Indeterminate DVT of the Posterior Tibial Vein.  In comparison to LEV done 7/19 there is no longer thrombus in the POP vein on the left, but there still appears to be non occlusive thrombus seen in the PTV on the left.  CONCLUSIONS   Technically difficult study.   This document has been electronically    SIGNED BY: Darius Azevedo MD On: 09/23/2019 17:20      Nuclear Stress 06/20/2018   Nuclear Quantitative Functional Analysis:   LVEF: 46 %  LVED Volume: 144 ml  LVES Volume: 91 ml    Impression: NORMAL MYOCARDIAL PERFUSION  1. The perfusion scan is free of evidence for myocardial ischemia or injury.   2. Resting wall motion is physiologic.   3. There is resting LV dysfunction with a reduced ejection fraction of 46 %.   4. The ventricular volumes are normal at rest and stress.   5. The extracardiac distribution of radioactivity is normal.       Past Medical History:   Diagnosis Date    Acquired renal cyst of left kidney     Anemia associated with chronic renal failure     CAD (coronary artery disease)     nonobstructive c 9/14    CHF (congestive heart failure)     Chronic immunosuppression with Prograf and MMF 06/18/2015    Chronic venous insufficiency of lower extremity     CKD (chronic kidney disease) stage 3, GFR 30-59 ml/min     Diabetic retinopathy     DM (diabetes mellitus), type 2 with complications 1994    Edema     End stage kidney disease     s/p transplant, doing well    Gallbladder polyp     Heart failure, diastolic, due to HTN      Hemodialysis status     off since transplant    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 RNA NEGATIVE 2015, ,     History of colon polyps     HPTH (hyperparathyroidism)     Hyperlipidemia     Hypertension associated with stage 3 chronic kidney disease due to type 2 diabetes mellitus     LBBB (left bundle branch block) 2021    Morbid obesity with BMI of 45.0-49.9, adult     PCO (posterior capsular opacification), left 2019    Proteinuria     resolved s/p transplant    S/P kidney transplant     Sleep apnea     Type 2 diabetes, uncontrolled, with retinopathy     Type II diabetes mellitus with renal manifestations        Past Surgical History:   Procedure Laterality Date    CARDIAC CATHETERIZATION      normal coronary    COLONOSCOPY N/A 2018    Procedure: COLONOSCOPY;  Surgeon: Chava Ronquillo MD;  Location: [facility] ENDO;  Service: Endoscopy;  Laterality: N/A;    COLONOSCOPY N/A 2022    Procedure: COLONOSCOPY;  Surgeon: Alix Puente MD;  Location: [facility] ENDO;  Service: Endoscopy;  Laterality: N/A;    KIDNEY TRANSPLANT      RETINAL LASER PROCEDURE         Social History     Tobacco Use    Smoking status: Former     Types: Cigarettes     Quit date: 2013     Years since quittin.7     Passive exposure: Past    Smokeless tobacco: Former     Quit date: 2013    Tobacco comments:     used marijuana since [id number], stopped after started dialysis   Substance Use Topics    Alcohol use: No     Alcohol/week: 0.0 standard drinks    Drug use: No     Comment:         Family History   Problem Relation Age of Onset    Diabetes Mother     Hypertension Mother     Heart failure Mother     Kidney disease Sister         ESRD    Diabetes Sister     Diabetes Maternal Grandmother     Cancer Neg Hx        Patient's Medications   New Prescriptions    No medications on file   Previous Medications    APIXABAN (ELIQUIS) 5 MG TAB    Take 1 tablet (5 mg total) by mouth 2  "(two) times daily.    ASPIRIN (ECOTRIN) 81 MG EC TABLET    Take 1 tablet by mouth Daily.     BD ULTRA-FINE MINI PEN NEEDLE 31 GAUGE X 3/16" NDLE    Use to inject insulin as needed up to 4 times daily    BLOOD SUGAR DIAGNOSTIC STRP    Use to test four times per day    BLOOD SUGAR DIAGNOSTIC STRP    100 each by Misc.(Non-Drug; Combo Route) route 4 (four) times daily before meals and nightly.    BLOOD-GLUCOSE METER (FREESTYLE LITE METER) KIT    1 each 4 (four) times daily.    CALCITRIOL (ROCALTROL) 0.25 MCG CAP    Take 1 capsule (0.25 mcg total) by mouth once daily.    DICLOFENAC SODIUM (VOLTAREN) 1 % GEL    Apply 2 g topically 3 (three) times daily.    FAMOTIDINE (PEPCID) 20 MG TABLET    TAKE ONE TABLET BY MOUTH EVERY EVENING    FISH OIL-OMEGA-3 FATTY ACIDS 300-1,000 MG CAPSULE    Take 1 g by mouth once daily.    FUROSEMIDE (LASIX) 80 MG TABLET    Take one-half tablet (40 mg) by mouth 2 (two) times daily.    GLIMEPIRIDE (AMARYL) 2 MG TABLET    Take 1 tablet (2 mg total) by mouth before breakfast.    INSULIN (LANTUS SOLOSTAR U-100 INSULIN) GLARGINE 100 UNITS/ML SUBQ PEN    Inject 35 Units into the skin 2 (two) times daily.    INSULIN ASPART U-100 (NOVOLOG FLEXPEN U-100 INSULIN) 100 UNIT/ML (3 ML) INPN PEN    Inject 25 Units into the skin 3 (three) times daily with meals.    KETOCONAZOLE (NIZORAL) 200 MG TAB    Take HALF A tablet (100 mg total) by mouth once daily.    LANCETS (MICROLET LANCET) MISC    100 lancets by Misc.(Non-Drug; Combo Route) route 4 (four) times daily before meals and nightly.    MAGNESIUM OXIDE (MAG-OX) 400 MG (241.3 MG MAGNESIUM) TABLET    Take 1 tablet (400 mg total) by mouth once daily.    METOLAZONE (ZAROXOLYN) 2.5 MG TABLET    Take 1 tablet by mouth on Monday and Friday as directed    METOPROLOL SUCCINATE (TOPROL-XL) 25 MG 24 HR TABLET    Take 1 tablet (25 mg total) by mouth once daily.    MONTELUKAST (SINGULAIR) 10 MG TABLET    Take 1 tablet (10 mg total) by mouth every evening.    " "MULTIVITAMIN (THERAGRAN) TABLET    Take 1 tablet by mouth once daily.    MUPIROCIN (BACTROBAN) 2 % OINTMENT    Apply topically 3 (three) times daily. Use as directed on the leg wound.    MYCOPHENOLATE (CELLCEPT) 250 MG CAP    Take 3 capsules (750 mg total) by mouth 2 (two) times daily.    ONDANSETRON (ZOFRAN) 4 MG TABLET    Take 1 tablet (4 mg total) by mouth every 6 (six) hours.    PEN NEEDLE, DIABETIC (BD INSULIN PEN NEEDLE UF SHORT) 31 GAUGE X 5/16" NDLE    USE TO INJECT INSULIN TWICE A DAY    POTASSIUM CHLORIDE SA (K-DUR,KLOR-CON) 20 MEQ TABLET    Take 2 tablets (40 mEq total) by mouth once daily.    PREDNISONE (DELTASONE) 5 MG TABLET    Take 1 tablet (5 mg total) by mouth once daily.    PROMETHAZINE-DEXTROMETHORPHAN (PROMETHAZINE-DM) 6.25-15 MG/5 ML SYRP    Take 5 mLs by mouth every 6 (six) hours as needed (cough).    ROSUVASTATIN (CRESTOR) 40 MG TAB    Take 1 tablet (40 mg total) by mouth once daily in the evening.    SACUBITRIL-VALSARTAN (ENTRESTO)  MG PER TABLET    Take 1 tablet by mouth 2 (two) times daily.    TACROLIMUS (PROGRAF) 1 MG CAP    Take 4 capsules (4 mg total) by mouth every 12 (twelve) hours.    TAMSULOSIN (FLOMAX) 0.4 MG CAP    Take 1 capsule (0.4 mg total) by mouth once daily.    TRIAMCINOLONE ACETONIDE 0.1% (KENALOG) 0.1 % OINTMENT    Apply topically 2 (two) times daily. Use on bilateral lower legs.    VALGANCICLOVIR (VALCYTE) 450 MG TAB    Take 1 tablet (450 mg total) by mouth once daily. for 21 days   Modified Medications    No medications on file   Discontinued Medications    No medications on file       Review of Systems   Constitutional:  Positive for malaise/fatigue.   HENT: Negative.     Eyes: Negative.    Respiratory:  Positive for cough and shortness of breath.    Cardiovascular:  Positive for leg swelling. Negative for chest pain and palpitations.   Gastrointestinal: Negative.    Genitourinary: Negative.    Musculoskeletal:  Positive for back pain.   Skin: Negative.  "   Neurological:  Positive for dizziness.   Endo/Heme/Allergies: Negative.    Psychiatric/Behavioral: Negative.     All 12 systems otherwise negative.    Wt Readings from Last 3 Encounters:   01/30/23 117 kg (258 lb)   01/30/23 117 kg (258 lb 0.8 oz)   01/27/23 119.7 kg (263 lb 14.3 oz)     Temp Readings from Last 3 Encounters:   01/30/23 97.8 °F (36.6 °C) (Oral)   01/27/23 97.8 °F (36.6 °C)   12/28/22 97.6 °F (36.4 °C) (Oral)     BP Readings from Last 3 Encounters:   01/30/23 136/64   01/30/23 138/70   01/27/23 106/72     Pulse Readings from Last 3 Encounters:   01/30/23 96   01/30/23 100   01/27/23 110       There were no vitals taken for this visit.    Objective:   Physical Exam  Vitals and nursing note reviewed.   Constitutional:       General: He is not in acute distress.     Appearance: He is well-developed. He is not diaphoretic.   HENT:      Head: Normocephalic and atraumatic.      Nose: Nose normal.   Eyes:      General: No scleral icterus.     Conjunctiva/sclera: Conjunctivae normal.   Neck:      Thyroid: No thyromegaly.      Vascular: No JVD.   Cardiovascular:      Rate and Rhythm: Normal rate and regular rhythm.      Heart sounds: S1 normal and S2 normal. No murmur heard.    No friction rub. No gallop. No S3 or S4 sounds.   Pulmonary:      Effort: Pulmonary effort is normal. No respiratory distress.      Breath sounds: Normal breath sounds. No stridor. No wheezing or rales.   Chest:      Chest wall: No tenderness.   Abdominal:      General: Bowel sounds are normal. There is no distension.      Palpations: Abdomen is soft. There is no mass.      Tenderness: There is no abdominal tenderness. There is no rebound.   Genitourinary:     Comments: Deferred  Musculoskeletal:         General: No tenderness or deformity. Normal range of motion.      Cervical back: Normal range of motion and neck supple.      Right lower leg: Edema present.      Left lower leg: Edema present.   Lymphadenopathy:      Cervical: No  cervical adenopathy.   Skin:     General: Skin is warm and dry.      Coloration: Skin is not pale.      Findings: No erythema or rash.   Neurological:      Mental Status: He is alert and oriented to person, place, and time.      Motor: No abnormal muscle tone.      Coordination: Coordination normal.   Psychiatric:         Behavior: Behavior normal.         Thought Content: Thought content normal.         Judgment: Judgment normal.       Lab Results   Component Value Date     01/27/2023    K 4.1 01/27/2023    CL 97 01/27/2023    CO2 25 01/27/2023    BUN 48 (H) 01/27/2023    CREATININE 3.3 (H) 01/27/2023     (H) 01/27/2023    HGBA1C 7.1 (H) 12/09/2022    MG 2.1 03/03/2022    AST 16 01/27/2023    ALT 8 (L) 01/27/2023    ALBUMIN 3.4 (L) 01/27/2023    PROT 7.2 01/27/2023    BILITOT 0.6 01/27/2023    WBC 6.29 01/27/2023    HGB 10.2 (L) 01/27/2023    HCT 35.9 (L) 01/27/2023    HCT 36 06/12/2015    MCV 90 01/27/2023     01/27/2023    INR 1.0 06/18/2015    TSH 0.999 03/11/2022    CHOL 183 09/19/2022    HDL 46 09/19/2022    LDLCALC 114.4 09/19/2022    TRIG 113 09/19/2022    BNP 49 01/27/2023     Assessment:      1. Chronic combined systolic and diastolic congestive heart failure    2. LBBB (left bundle branch block)    3. Coronary artery disease of native artery of native heart with stable angina pectoris    4. Hypertension associated with stage 3 chronic kidney disease due to type 2 diabetes mellitus    5. DM (diabetes mellitus), type 2 with complications    6. Class 3 severe obesity due to excess calories with serious comorbidity and body mass index (BMI) of 40.0 to 44.9 in adult    7. Stage 3 chronic kidney disease, unspecified whether stage 3a or 3b CKD    8. History of DVT (deep vein thrombosis)    9. Coronary artery disease involving native coronary artery of native heart without angina pectoris    10. Stage 3b chronic kidney disease    11. Type 2 diabetes mellitus with diabetic nephropathy,  unspecified whether long term insulin use          Plan:    1. Chronic CHF EF 40-45% ; neg for TTR amyloidosis  - cont lasix, and extra PRN; metolazone 2.5 mg Mon and Fri   - titrate Toprol and Entresto;  - cont low salt diet, fluid restriction  - increase Entresto to max dose  - ECHO 2/2022 with EF 40%, normal RV function, PASP 27 mmHg.     2. HTN with mild edema  - cont meds for afterload reduction   - low salt diet  - rec compression stockings  - stop hydralazine     3. HLD  - cont statin    4. ESRD s/p Transplant with CKD  - f/u with nephro, repeat labs and adjust tx per nephro    5. CAD, non obs  - cont meds  - stress 6/2018 negative    6. MARION  -cont CPAP    7. Obesity BMI 44 - 285 lbs --> 41 - 267  - cont weight loss with diet/exercise     8. H/o DVT  - cont Eliquis 5 BID  - non occlusive thrombus in PTV in left but no thrombus in POP vein on left.    9. Dizziness, had syncope recently with headaches  - refer to Neuro   - likeky sec to Flomax and Proscar, can discuss with urology  - monitor BP as well    10. DM2 - A1c -9.3 --> 8.0 --> 7.5 --> 6.8 --> 7.1  - titrate meds per PCP    11. H/O COVID 19  - long covid sx now  - cont supportive tx    Thank you for allowing me to participate in this patient's care. Please do not hesitate to contact me with any questions or concerns. Consult note has been forwarded to the referral physician.

## 2023-02-26 DIAGNOSIS — R05.8 ALLERGIC COUGH: ICD-10-CM

## 2023-02-27 NOTE — TELEPHONE ENCOUNTER
Refill Routing Note   Medication(s) are not appropriate for processing by [facility] for the following reason(s):       ED/Hospital Visit since last OV with PCP    [facility] action(s):  Defer         Appointments  past 12m or future 3m with PCP    Date Provider   Last Visit   12/28/2022 Alix Kowalski, DO   Next Visit   3/28/2023 Alix Kowalski, DO   ED visits in past 90 days: 1        Note composed:11:36 AM 02/27/2023

## 2023-02-27 NOTE — TELEPHONE ENCOUNTER
No new care gaps identified.  [facility] Embedded Care Gaps. Reference number: [id number]. 2/26/2023   8:26:17 PM CST

## 2023-02-28 RX ORDER — MONTELUKAST SODIUM 10 MG/1
10 TABLET ORAL NIGHTLY
Qty: 90 TABLET | Refills: 1 | Status: SHIPPED | OUTPATIENT
Start: 2023-02-28 | End: 2023-08-27 | Stop reason: SDUPTHER

## 2023-03-06 RX ORDER — KETOCONAZOLE 200 MG/1
100 TABLET ORAL DAILY
Qty: 15 TABLET | Refills: 9 | Status: SHIPPED | OUTPATIENT
Start: 2023-03-06 | End: 2023-12-18 | Stop reason: SDUPTHER

## 2023-03-13 PROBLEM — N17.9 AKI (ACUTE KIDNEY INJURY): Status: RESOLVED | Noted: 2022-12-08 | Resolved: 2023-03-13

## 2023-03-13 PROBLEM — N17.9 ACUTE ON CHRONIC RENAL FAILURE: Status: RESOLVED | Noted: 2021-12-06 | Resolved: 2023-03-13

## 2023-03-13 PROBLEM — N18.9 ACUTE ON CHRONIC RENAL FAILURE: Status: RESOLVED | Noted: 2021-12-06 | Resolved: 2023-03-13

## 2023-03-20 ENCOUNTER — PATIENT OUTREACH (OUTPATIENT)
Dept: ADMINISTRATIVE | Facility: HOSPITAL | Age: 68
End: 2023-03-20
Payer: COMMERCIAL

## 2023-03-20 DIAGNOSIS — E11.42 DIABETIC PERIPHERAL NEUROPATHY: Primary | ICD-10-CM

## 2023-03-20 DIAGNOSIS — E11.8 DM (DIABETES MELLITUS), TYPE 2 WITH COMPLICATIONS: ICD-10-CM

## 2023-03-20 DIAGNOSIS — E11.21 TYPE 2 DIABETES MELLITUS WITH DIABETIC NEPHROPATHY, UNSPECIFIED WHETHER LONG TERM INSULIN USE: ICD-10-CM

## 2023-03-26 NOTE — TELEPHONE ENCOUNTER
Care Due:                  Date            Visit Type   Department     Provider  --------------------------------------------------------------------------------                                [facility]     [facility] INTERNAL  Last Visit: 12-      FOLLOW UP    MARCIO Kowalski                              EP -                              PRIMARY      [facility] INTERNAL  Next Visit: 03-      CARE ([facility])   MARCIO Kowalski                                                            Last  Test          Frequency    Reason                     Performed    Due Date  --------------------------------------------------------------------------------    HBA1C.......  6 months...  glimepiride, insulin.....  12-   06-    [facility] Embedded Care Gaps. Reference number: [id number]. 3/26/2023   5:09:00 AM CDT

## 2023-03-27 NOTE — TELEPHONE ENCOUNTER
Refill Routing Note   Medication(s) are not appropriate for processing by [facility] for the following reason(s):      Patient seen in ED/Hospital since LOV with PCP  Required labs abnormal    [facility] action(s):  Defer Labs due          Appointments  past 12m or future 3m with PCP    Date Provider   Last Visit   12/28/2022 Alix Kowalski, DO   Next Visit   3/28/2023 Alix Kowalski, DO   ED visits in past 90 days: 1        Note composed:10:34 AM 03/27/2023

## 2023-03-28 ENCOUNTER — LAB VISIT (OUTPATIENT)
Dept: LAB | Facility: HOSPITAL | Age: 68
End: 2023-03-28
Attending: INTERNAL MEDICINE
Payer: COMMERCIAL

## 2023-03-28 ENCOUNTER — OFFICE VISIT (OUTPATIENT)
Dept: INTERNAL MEDICINE | Facility: CLINIC | Age: 68
End: 2023-03-28
Payer: COMMERCIAL

## 2023-03-28 ENCOUNTER — TELEPHONE (OUTPATIENT)
Dept: NEPHROLOGY | Facility: CLINIC | Age: 68
End: 2023-03-28
Payer: COMMERCIAL

## 2023-03-28 ENCOUNTER — CLINICAL SUPPORT (OUTPATIENT)
Dept: PULMONOLOGY | Facility: CLINIC | Age: 68
End: 2023-03-28
Payer: COMMERCIAL

## 2023-03-28 ENCOUNTER — TELEPHONE (OUTPATIENT)
Dept: PHYSICAL MEDICINE AND REHAB | Facility: CLINIC | Age: 68
End: 2023-03-28
Payer: COMMERCIAL

## 2023-03-28 VITALS
HEART RATE: 104 BPM | BODY MASS INDEX: 42.66 KG/M2 | DIASTOLIC BLOOD PRESSURE: 54 MMHG | RESPIRATION RATE: 20 BRPM | HEIGHT: 67 IN | WEIGHT: 271.81 LBS | SYSTOLIC BLOOD PRESSURE: 104 MMHG | OXYGEN SATURATION: 98 % | TEMPERATURE: 96 F

## 2023-03-28 DIAGNOSIS — I50.42 CHRONIC COMBINED SYSTOLIC AND DIASTOLIC CHF (CONGESTIVE HEART FAILURE): ICD-10-CM

## 2023-03-28 DIAGNOSIS — E11.65 UNCONTROLLED TYPE 2 DIABETES MELLITUS WITH HYPERGLYCEMIA, WITH LONG-TERM CURRENT USE OF INSULIN: Primary | ICD-10-CM

## 2023-03-28 DIAGNOSIS — N18.4 CKD (CHRONIC KIDNEY DISEASE) STAGE 4, GFR 15-29 ML/MIN: ICD-10-CM

## 2023-03-28 DIAGNOSIS — E11.65 UNCONTROLLED TYPE 2 DIABETES MELLITUS WITH HYPERGLYCEMIA, WITH LONG-TERM CURRENT USE OF INSULIN: ICD-10-CM

## 2023-03-28 DIAGNOSIS — Z79.4 UNCONTROLLED TYPE 2 DIABETES MELLITUS WITH HYPERGLYCEMIA, WITH LONG-TERM CURRENT USE OF INSULIN: Primary | ICD-10-CM

## 2023-03-28 DIAGNOSIS — Z79.4 UNCONTROLLED TYPE 2 DIABETES MELLITUS WITH HYPERGLYCEMIA, WITH LONG-TERM CURRENT USE OF INSULIN: ICD-10-CM

## 2023-03-28 DIAGNOSIS — E78.5 HYPERLIPIDEMIA LDL GOAL <70: ICD-10-CM

## 2023-03-28 DIAGNOSIS — R05.3 CHRONIC COUGH: ICD-10-CM

## 2023-03-28 LAB
ALBUMIN SERPL BCP-MCNC: 3.5 G/DL (ref 3.5–5.2)
ALP SERPL-CCNC: 53 U/L (ref 55–135)
ALT SERPL W/O P-5'-P-CCNC: 15 U/L (ref 10–44)
ANION GAP SERPL CALC-SCNC: 12 MMOL/L (ref 8–16)
AST SERPL-CCNC: 17 U/L (ref 10–40)
BILIRUB SERPL-MCNC: 0.4 MG/DL (ref 0.1–1)
BRPFT: NORMAL
BUN SERPL-MCNC: 47 MG/DL (ref 8–23)
CALCIUM SERPL-MCNC: 9.3 MG/DL (ref 8.7–10.5)
CHLORIDE SERPL-SCNC: 101 MMOL/L (ref 95–110)
CHOLEST SERPL-MCNC: 174 MG/DL (ref 120–199)
CHOLEST/HDLC SERPL: 4.5 {RATIO} (ref 2–5)
CO2 SERPL-SCNC: 29 MMOL/L (ref 23–29)
CREAT SERPL-MCNC: 2.6 MG/DL (ref 0.5–1.4)
DLCO ADJ PRE: 10.35 ML/(MIN*MMHG)
DLCO SINGLE BREATH LLN: 17.87
DLCO SINGLE BREATH PRE REF: 41.8 %
DLCO SINGLE BREATH REF: 24.79
DLCOC SBVA LLN: 2.55
DLCOC SBVA PRE REF: 113.4 %
DLCOC SBVA REF: 3.81
DLCOC SINGLE BREATH LLN: 17.87
DLCOC SINGLE BREATH PRE REF: 41.8 %
DLCOC SINGLE BREATH REF: 24.79
DLCOVA LLN: 2.55
DLCOVA PRE REF: 113.4 %
DLCOVA PRE: 4.32 ML/(MIN*MMHG*L)
DLCOVA REF: 3.81
DLVAADJ PRE: 4.32 ML/(MIN*MMHG*L)
ERV LLN: -16449.01
ERV PRE REF: 35.2 %
ERV REF: 0.99
EST. GFR  (NO RACE VARIABLE): 25.9 ML/MIN/1.73 M^2
ESTIMATED AVG GLUCOSE: 146 MG/DL (ref 68–131)
FEF 25 75 CHG: 9.6 %
FEF 25 75 LLN: 0.73
FEF 25 75 POST REF: 118.8 %
FEF 25 75 PRE REF: 108.4 %
FEF 25 75 REF: 2
FET100 CHG: 6.7 %
FEV1 CHG: -1.6 %
FEV1 FVC CHG: 2.2 %
FEV1 FVC LLN: 64
FEV1 FVC POST REF: 106.8 %
FEV1 FVC PRE REF: 104.6 %
FEV1 FVC REF: 77
FEV1 LLN: 1.73
FEV1 POST REF: 73.3 %
FEV1 PRE REF: 74.5 %
FEV1 REF: 2.5
FRCPLETH LLN: 2.52
FRCPLETH PREREF: 57.7 %
FRCPLETH REF: 3.51
FVC CHG: -3.7 %
FVC LLN: 2.34
FVC POST REF: 68.6 %
FVC PRE REF: 71.2 %
FVC REF: 3.24
GLUCOSE SERPL-MCNC: 126 MG/DL (ref 70–110)
HBA1C MFR BLD: 6.7 % (ref 4–5.6)
HDLC SERPL-MCNC: 39 MG/DL (ref 40–75)
HDLC SERPL: 22.4 % (ref 20–50)
IVC PRE: 1.6 L
IVC SINGLE BREATH LLN: 2.34
IVC SINGLE BREATH PRE REF: 49.3 %
IVC SINGLE BREATH REF: 3.24
LDLC SERPL CALC-MCNC: 96.6 MG/DL (ref 63–159)
MVV LLN: 96
MVV PRE REF: 70.1 %
MVV REF: 113
NONHDLC SERPL-MCNC: 135 MG/DL
PEF CHG: -11.1 %
PEF LLN: 4.88
PEF POST REF: 70.4 %
PEF PRE REF: 79.2 %
PEF REF: 7.31
POST FEF 25 75: 2.38 L/S
POST FET 100: 8.3 SEC
POST FEV1 FVC: 82.5 %
POST FEV1: 1.83 L
POST FVC: 2.22 L
POST PEF: 5.14 L/S
POTASSIUM SERPL-SCNC: 4.3 MMOL/L (ref 3.5–5.1)
PRE DLCO: 10.35 ML/(MIN*MMHG)
PRE ERV: 0.35 L
PRE FEF 25 75: 2.17 L/S
PRE FET 100: 7.78 SEC
PRE FEV1 FVC: 80.76 %
PRE FEV1: 1.86 L
PRE FRC PL: 2.02 L
PRE FVC: 2.31 L
PRE MVV: 79 L/MIN
PRE PEF: 5.79 L/S
PRE RV: 1.32 L
PRE TLC: 3.69 L
PROT SERPL-MCNC: 6.1 G/DL (ref 6–8.4)
RAW LLN: 3.06
RAW PRE REF: 124.9 %
RAW PRE: 3.82 CMH2O*S/L
RAW REF: 3.06
RV LLN: 1.84
RV PRE REF: 52.4 %
RV REF: 2.52
RVTLC LLN: 32
RVTLC PRE REF: 87.4 %
RVTLC PRE: 35.71 %
RVTLC REF: 41
SODIUM SERPL-SCNC: 142 MMOL/L (ref 136–145)
TLC LLN: 5.35
TLC PRE REF: 56.8 %
TLC REF: 6.5
TRIGL SERPL-MCNC: 192 MG/DL (ref 30–150)
VA PRE: 2.41 L
VA SINGLE BREATH LLN: 6.35
VA SINGLE BREATH PRE REF: 38 %
VA SINGLE BREATH REF: 6.35
VC LLN: 2.34
VC PRE REF: 73.3 %
VC PRE: 2.38 L
VC REF: 3.24
VTGRAWPRE: 2.13 L

## 2023-03-28 PROCEDURE — 4010F PR ACE/ARB THEARPY RXD/TAKEN: ICD-10-PCS | Mod: CPTII,S$GLB,, | Performed by: INTERNAL MEDICINE

## 2023-03-28 PROCEDURE — 3008F BODY MASS INDEX DOCD: CPT | Mod: CPTII,S$GLB,, | Performed by: INTERNAL MEDICINE

## 2023-03-28 PROCEDURE — 3074F SYST BP LT 130 MM HG: CPT | Mod: CPTII,S$GLB,, | Performed by: INTERNAL MEDICINE

## 2023-03-28 PROCEDURE — 3078F DIAST BP <80 MM HG: CPT | Mod: CPTII,S$GLB,, | Performed by: INTERNAL MEDICINE

## 2023-03-28 PROCEDURE — 1100F PTFALLS ASSESS-DOCD GE2>/YR: CPT | Mod: CPTII,S$GLB,, | Performed by: INTERNAL MEDICINE

## 2023-03-28 PROCEDURE — 99999 PR PBB SHADOW E&M-EST. PATIENT-LVL I: ICD-10-PCS | Mod: PBBFAC,,,

## 2023-03-28 PROCEDURE — 83036 HEMOGLOBIN GLYCOSYLATED A1C: CPT | Performed by: INTERNAL MEDICINE

## 2023-03-28 PROCEDURE — 99214 OFFICE O/P EST MOD 30 MIN: CPT | Mod: S$GLB,,, | Performed by: INTERNAL MEDICINE

## 2023-03-28 PROCEDURE — 99999 PR PBB SHADOW E&M-EST. PATIENT-LVL V: CPT | Mod: PBBFAC,,, | Performed by: INTERNAL MEDICINE

## 2023-03-28 PROCEDURE — 99999 PR PBB SHADOW E&M-EST. PATIENT-LVL I: CPT | Mod: PBBFAC,,,

## 2023-03-28 PROCEDURE — 3074F PR MOST RECENT SYSTOLIC BLOOD PRESSURE < 130 MM HG: ICD-10-PCS | Mod: CPTII,S$GLB,, | Performed by: INTERNAL MEDICINE

## 2023-03-28 PROCEDURE — 3288F PR FALLS RISK ASSESSMENT DOCUMENTED: ICD-10-PCS | Mod: CPTII,S$GLB,, | Performed by: INTERNAL MEDICINE

## 2023-03-28 PROCEDURE — 1126F AMNT PAIN NOTED NONE PRSNT: CPT | Mod: CPTII,S$GLB,, | Performed by: INTERNAL MEDICINE

## 2023-03-28 PROCEDURE — 99214 PR OFFICE/OUTPT VISIT, EST, LEVL IV, 30-39 MIN: ICD-10-PCS | Mod: S$GLB,,, | Performed by: INTERNAL MEDICINE

## 2023-03-28 PROCEDURE — 94726 PLETHYSMOGRAPHY LUNG VOLUMES: CPT | Mod: S$GLB,,, | Performed by: INTERNAL MEDICINE

## 2023-03-28 PROCEDURE — 94729 PR C02/MEMBANE DIFFUSE CAPACITY: ICD-10-PCS | Mod: S$GLB,,, | Performed by: INTERNAL MEDICINE

## 2023-03-28 PROCEDURE — 94726 PULM FUNCT TST PLETHYSMOGRAP: ICD-10-PCS | Mod: S$GLB,,, | Performed by: INTERNAL MEDICINE

## 2023-03-28 PROCEDURE — 3288F FALL RISK ASSESSMENT DOCD: CPT | Mod: CPTII,S$GLB,, | Performed by: INTERNAL MEDICINE

## 2023-03-28 PROCEDURE — 1126F PR PAIN SEVERITY QUANTIFIED, NO PAIN PRESENT: ICD-10-PCS | Mod: CPTII,S$GLB,, | Performed by: INTERNAL MEDICINE

## 2023-03-28 PROCEDURE — 4010F ACE/ARB THERAPY RXD/TAKEN: CPT | Mod: CPTII,S$GLB,, | Performed by: INTERNAL MEDICINE

## 2023-03-28 PROCEDURE — 1160F RVW MEDS BY RX/DR IN RCRD: CPT | Mod: CPTII,S$GLB,, | Performed by: INTERNAL MEDICINE

## 2023-03-28 PROCEDURE — 80061 LIPID PANEL: CPT | Performed by: INTERNAL MEDICINE

## 2023-03-28 PROCEDURE — 80053 COMPREHEN METABOLIC PANEL: CPT | Performed by: INTERNAL MEDICINE

## 2023-03-28 PROCEDURE — 99999 PR PBB SHADOW E&M-EST. PATIENT-LVL V: ICD-10-PCS | Mod: PBBFAC,,, | Performed by: INTERNAL MEDICINE

## 2023-03-28 PROCEDURE — 36415 COLL VENOUS BLD VENIPUNCTURE: CPT | Performed by: INTERNAL MEDICINE

## 2023-03-28 PROCEDURE — 1160F PR REVIEW ALL MEDS BY PRESCRIBER/CLIN PHARMACIST DOCUMENTED: ICD-10-PCS | Mod: CPTII,S$GLB,, | Performed by: INTERNAL MEDICINE

## 2023-03-28 PROCEDURE — 94729 DIFFUSING CAPACITY: CPT | Mod: S$GLB,,, | Performed by: INTERNAL MEDICINE

## 2023-03-28 PROCEDURE — 94060 PR EVAL OF BRONCHOSPASM: ICD-10-PCS | Mod: S$GLB,,, | Performed by: INTERNAL MEDICINE

## 2023-03-28 PROCEDURE — 1159F PR MEDICATION LIST DOCUMENTED IN MEDICAL RECORD: ICD-10-PCS | Mod: CPTII,S$GLB,, | Performed by: INTERNAL MEDICINE

## 2023-03-28 PROCEDURE — 3078F PR MOST RECENT DIASTOLIC BLOOD PRESSURE < 80 MM HG: ICD-10-PCS | Mod: CPTII,S$GLB,, | Performed by: INTERNAL MEDICINE

## 2023-03-28 PROCEDURE — 94060 EVALUATION OF WHEEZING: CPT | Mod: S$GLB,,, | Performed by: INTERNAL MEDICINE

## 2023-03-28 PROCEDURE — 1159F MED LIST DOCD IN RCRD: CPT | Mod: CPTII,S$GLB,, | Performed by: INTERNAL MEDICINE

## 2023-03-28 PROCEDURE — 3008F PR BODY MASS INDEX (BMI) DOCUMENTED: ICD-10-PCS | Mod: CPTII,S$GLB,, | Performed by: INTERNAL MEDICINE

## 2023-03-28 PROCEDURE — 1100F PR PT FALLS ASSESS DOC 2+ FALLS/FALL W/INJURY/YR: ICD-10-PCS | Mod: CPTII,S$GLB,, | Performed by: INTERNAL MEDICINE

## 2023-03-28 RX ORDER — INSULIN GLARGINE 100 [IU]/ML
35 INJECTION, SOLUTION SUBCUTANEOUS 2 TIMES DAILY
Qty: 30 ML | Refills: 2 | Status: SHIPPED | OUTPATIENT
Start: 2023-03-28 | End: 2023-09-12 | Stop reason: SDUPTHER

## 2023-03-28 NOTE — PROGRESS NOTES
Mitch Whittaker  67 y.o. Black or  male  [id number]    Chief Complaint:  Chief Complaint   Patient presents with    Follow-up       History of Present Illness:  Presents to the clinic for follow up.   DM--compliant with insulin. Due for labs.   HLD--compliant with rosuvastatin.   CKD IV--has not seen nephrology in a while. Due for f/u. Denies symptoms.   CHF--management per cardiology.     Laboratory:  Lab Results   Component Value Date    WBC 6.29 01/27/2023    HGB 10.2 (L) 01/27/2023    HCT 35.9 (L) 01/27/2023     01/27/2023    CHOL 183 09/19/2022    TRIG 113 09/19/2022    HDL 46 09/19/2022    ALT 8 (L) 01/27/2023    AST 16 01/27/2023     01/27/2023    K 4.1 01/27/2023    CL 97 01/27/2023    CREATININE 3.3 (H) 01/27/2023    BUN 48 (H) 01/27/2023    CO2 25 01/27/2023    TSH 0.999 03/11/2022    PSA 0.33 10/18/2021    INR 1.0 06/18/2015    HGBA1C 7.1 (H) 12/09/2022       History:  Past Medical History:   Diagnosis Date    Acquired renal cyst of left kidney     Anemia associated with chronic renal failure     CAD (coronary artery disease)     nonobstructive [facility] 9/14    CHF (congestive heart failure)     Chronic immunosuppression with Prograf and MMF 06/18/2015    Chronic venous insufficiency of lower extremity     CKD (chronic kidney disease) stage 3, GFR 30-59 ml/min     Diabetic retinopathy     DM (diabetes mellitus), type 2 with complications 1994    Edema     End stage kidney disease     s/p transplant, doing well    Gallbladder polyp     Heart failure, diastolic, due to HTN     Hemodialysis status     off since transplant    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 RNA NEGATIVE 5/2015, 2021, 2022    History of colon polyps     HPTH (hyperparathyroidism)     Hyperlipidemia     Hypertension associated with stage 3 chronic kidney disease due to type 2 diabetes mellitus     LBBB (left bundle branch block) 12/20/2021    Morbid obesity with BMI of 45.0-49.9,  "adult     PCO (posterior capsular opacification), left 03/04/2019    Proteinuria     resolved s/p transplant    S/P kidney transplant     Sleep apnea     Type 2 diabetes, uncontrolled, with retinopathy     Type II diabetes mellitus with renal manifestations        Medications:  Current Outpatient Medications on File Prior to Visit   Medication Sig Dispense Refill    apixaban (ELIQUIS) 5 mg Tab Take 1 tablet (5 mg total) by mouth 2 (two) times daily. 180 tablet 1    aspirin (ECOTRIN) 81 MG EC tablet Take 1 tablet by mouth Daily.       BD ULTRA-FINE MINI PEN NEEDLE 31 gauge x 3/16" Ndle Use to inject insulin as needed up to 4 times daily 100 each 3    blood sugar diagnostic Strp Use to test four times per day 150 strip 11    blood sugar diagnostic Strp 100 each by Misc.(Non-Drug; Combo Route) route 4 (four) times daily before meals and nightly. 100 each 1    blood-glucose meter (FREESTYLE LITE METER) kit 1 each 4 (four) times daily. 1 each 0    calcitRIOL (ROCALTROL) 0.25 MCG Cap Take 1 capsule (0.25 mcg total) by mouth once daily. 30 capsule 11    diclofenac sodium (VOLTAREN) 1 % Gel Apply 2 g topically 3 (three) times daily. 450 g 2    famotidine (PEPCID) 20 MG tablet TAKE ONE TABLET BY MOUTH EVERY EVENING 30 tablet 11    fish oil-omega-3 fatty acids 300-1,000 mg capsule Take 1 g by mouth once daily.      furosemide (LASIX) 80 MG tablet Take one-half tablet (40 mg) by mouth 2 (two) times daily. 30 tablet 11    glimepiride (AMARYL) 2 MG tablet Take 1 tablet (2 mg total) by mouth before breakfast. 90 tablet 3    insulin aspart U-100 (NOVOLOG FLEXPEN U-100 INSULIN) 100 unit/mL (3 mL) InPn pen Inject 25 Units into the skin 3 (three) times daily with meals. 30 mL 2    ketoconazole (NIZORAL) 200 mg Tab Take HALF A tablet (100 mg total) by mouth once daily. 15 tablet 9    lancets (MICROLET LANCET) Misc 100 lancets by Misc.(Non-Drug; Combo Route) route 4 (four) times daily before meals and nightly. 100 each 11    magnesium " "oxide (MAG-OX) 400 mg (241.3 mg magnesium) tablet Take 1 tablet (400 mg total) by mouth once daily. 90 tablet 1    metOLazone (ZAROXOLYN) 2.5 MG tablet Take 1 tablet by mouth on Monday and Friday as directed 30 tablet 11    metoprolol succinate (TOPROL-XL) 25 MG 24 hr tablet Take 1 tablet (25 mg total) by mouth once daily. 90 tablet 1    montelukast (SINGULAIR) 10 mg tablet Take 1 tablet (10 mg total) by mouth every evening. 90 tablet 1    multivitamin (THERAGRAN) tablet Take 1 tablet by mouth once daily.      mupirocin (BACTROBAN) 2 % ointment Apply topically 3 (three) times daily. Use as directed on the leg wound. 22 g 1    mycophenolate (CELLCEPT) 250 mg Cap Take 3 capsules (750 mg total) by mouth 2 (two) times daily. 180 capsule 11    ondansetron (ZOFRAN) 4 MG tablet Take 1 tablet (4 mg total) by mouth every 6 (six) hours. 30 tablet 0    pen needle, diabetic (BD INSULIN PEN NEEDLE UF SHORT) 31 gauge x 5/16" Ndle USE TO INJECT INSULIN TWICE A  each 5    potassium chloride SA (K-DUR,KLOR-CON) 20 MEQ tablet Take 2 tablets (40 mEq total) by mouth once daily. 180 tablet 1    predniSONE (DELTASONE) 5 MG tablet Take 1 tablet (5 mg total) by mouth once daily. 30 tablet 11    promethazine-dextromethorphan (PROMETHAZINE-DM) 6.25-15 mg/5 mL Syrp Take 5 mLs by mouth every 6 (six) hours as needed (cough). 180 mL 0    rosuvastatin (CRESTOR) 40 MG Tab Take 1 tablet (40 mg total) by mouth once daily in the evening. 90 tablet 1    sacubitriL-valsartan (ENTRESTO)  mg per tablet Take 1 tablet by mouth 2 (two) times daily. 180 tablet 1    tacrolimus (PROGRAF) 1 MG Cap Take 4 capsules (4 mg total) by mouth every 12 (twelve) hours. 240 capsule 11    tamsulosin (FLOMAX) 0.4 mg Cap Take 1 capsule (0.4 mg total) by mouth once daily. 30 capsule 11    triamcinolone acetonide 0.1% (KENALOG) 0.1 % ointment Apply topically 2 (two) times daily. Use on bilateral lower legs. 454 g 1    valGANciclovir (VALCYTE) 450 mg Tab Take 1 " tablet (450 mg total) by mouth once daily. for 21 days 21 tablet 0    insulin (LANTUS SOLOSTAR U-100 INSULIN) glargine 100 units/mL SubQ pen Inject 35 Units into the skin 2 (two) times daily. 30 mL 2     Current Facility-Administered Medications on File Prior to Visit   Medication Dose Route Frequency Provider Last Rate Last Admin    acetaminophen tablet 650 mg  650 mg Oral Once PRN Sal Lopez MD           Allergies:  Review of patient's allergies indicates:   Allergen Reactions    Lisinopril Other (See Comments)     Other reaction(s):  cough    Actos  [pioglitazone] Other (See Comments)     Other reaction(s): CHF    Metformin Other (See Comments)     Other reaction(s): renal insuff  Other reaction(s): CHF       Review of Systems   Constitutional:  Negative for fever.   Respiratory:  Negative for shortness of breath.    Cardiovascular:  Positive for leg swelling. Negative for chest pain.   Genitourinary:  Negative for dysuria.   Neurological:  Negative for dizziness.     Exam:  Vitals:    03/28/23 1014   BP: (!) 104/54   Pulse: 104   Resp: 20   Temp: 96.3 °F (35.7 °C)     Weight: 123.3 kg (271 lb 13.2 oz)   Body mass index is 42.57 kg/m².      Physical Exam  Vitals reviewed.   Constitutional:       General: He is not in acute distress.     Appearance: He is well-developed. He is not ill-appearing.   Eyes:      General: No scleral icterus.  Cardiovascular:      Rate and Rhythm: Normal rate and regular rhythm.      Heart sounds: Normal heart sounds.   Pulmonary:      Effort: Pulmonary effort is normal. No respiratory distress.      Breath sounds: Normal breath sounds.   Musculoskeletal:      Right lower leg: Edema present.      Left lower leg: Edema present.   Skin:     General: Skin is warm and dry.   Neurological:      Mental Status: He is alert and oriented to person, place, and time.   Psychiatric:         Behavior: Behavior normal.       Assessment:  The primary encounter diagnosis was Uncontrolled type 2  diabetes mellitus with hyperglycemia, with long-term current use of insulin. Diagnoses of CKD (chronic kidney disease) stage 4, GFR 15-29 ml/min and Chronic combined systolic and diastolic CHF (congestive heart failure) were also pertinent to this visit.    Plan:  Uncontrolled type 2 diabetes mellitus with hyperglycemia, with long-term current use of insulin  -     check A1C    Hyperlipidemia LDL goal <70  -     check lipids     CKD (chronic kidney disease) stage 4, GFR 15-29 ml/min  -     check CMP  -     f/u with nephrology    Chronic combined systolic and diastolic CHF (congestive heart failure)  -     f/u with cardiology     Schedule labs.

## 2023-03-28 NOTE — TELEPHONE ENCOUNTER
----- Message from Cecy Paredes sent at 3/28/2023  7:02 AM CDT -----  States he would like to cancel his 8:20 EMG appt this morning and reschedule. Please call pt [phone]. Thank you

## 2023-03-28 NOTE — TELEPHONE ENCOUNTER
----- Message from Mary Camacho MA sent at 3/28/2023 10:55 AM CDT -----  Patient was scheduled on 2/1/23 and looks like he missed that appointment. Patient is here in clinic until 2 pm with visits. He's trying to reschedule that appointment if someone can reach out to patient to assist with scheduling.

## 2023-04-04 ENCOUNTER — OFFICE VISIT (OUTPATIENT)
Dept: NEPHROLOGY | Facility: CLINIC | Age: 68
End: 2023-04-04
Payer: COMMERCIAL

## 2023-04-04 ENCOUNTER — LAB VISIT (OUTPATIENT)
Dept: LAB | Facility: HOSPITAL | Age: 68
End: 2023-04-04
Attending: INTERNAL MEDICINE
Payer: COMMERCIAL

## 2023-04-04 ENCOUNTER — PATIENT MESSAGE (OUTPATIENT)
Dept: NEPHROLOGY | Facility: CLINIC | Age: 68
End: 2023-04-04

## 2023-04-04 VITALS
HEIGHT: 67 IN | DIASTOLIC BLOOD PRESSURE: 60 MMHG | WEIGHT: 269.5 LBS | RESPIRATION RATE: 20 BRPM | BODY MASS INDEX: 42.3 KG/M2 | SYSTOLIC BLOOD PRESSURE: 100 MMHG | HEART RATE: 87 BPM

## 2023-04-04 DIAGNOSIS — Z94.0 KIDNEY TRANSPLANTED: ICD-10-CM

## 2023-04-04 DIAGNOSIS — Z94.0 KIDNEY TRANSPLANTED: Primary | ICD-10-CM

## 2023-04-04 PROCEDURE — 99215 OFFICE O/P EST HI 40 MIN: CPT | Mod: PBBFAC | Performed by: INTERNAL MEDICINE

## 2023-04-04 PROCEDURE — 36415 COLL VENOUS BLD VENIPUNCTURE: CPT | Performed by: INTERNAL MEDICINE

## 2023-04-04 PROCEDURE — 99215 PR OFFICE/OUTPT VISIT, EST, LEVL V, 40-54 MIN: ICD-10-PCS | Mod: S$GLB,,, | Performed by: INTERNAL MEDICINE

## 2023-04-04 PROCEDURE — 99999 PR PBB SHADOW E&M-EST. PATIENT-LVL V: CPT | Mod: PBBFAC,,, | Performed by: INTERNAL MEDICINE

## 2023-04-04 PROCEDURE — 99215 OFFICE O/P EST HI 40 MIN: CPT | Mod: S$GLB,,, | Performed by: INTERNAL MEDICINE

## 2023-04-04 PROCEDURE — 80197 ASSAY OF TACROLIMUS: CPT | Performed by: INTERNAL MEDICINE

## 2023-04-04 PROCEDURE — 99999 PR PBB SHADOW E&M-EST. PATIENT-LVL V: ICD-10-PCS | Mod: PBBFAC,,, | Performed by: INTERNAL MEDICINE

## 2023-04-04 NOTE — PROGRESS NOTES
Renal clinic f/u note:  Date of clinic visit: 4/4/23  Reason for f/u and chief c/o: h/o of kidney transplant. CHF, CKD. Recent diarrhea and positive CMV titer     HPI: Pt is a 66 y/o male with h/o of living related kidney transplant from his daughter in 2015 who presents for f/u. 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 Pt presents with his daughter to renal clinic. He was last seen by us in Dec 2022 in hospital. Chart was reviewed.  Labs, meds, immunosuppressive meds and doses reviewed with pt.    Pt had been admitted to [facility] for ANGELITO (Cr 4.3) and diarrhea. The cause for the latter was not clear. He did not have colonoscopy. CMV titer by PCR was positive and he was presumably treated for CMV colitis, inially with ganciclovir, then valcyte x 21 days on discharge. Diarrhea resolved right away after starting the anti-viral meds.    Pt is feeling well today. He is a poor historian, but his daughter is with him. No new c/o's, no GI sx's. Pt also has a h/o of CHF. He has maintained his fluid intake, denies SOB, has mild leg swelling.         PAST MEDICAL HISTORY: living related kidney transplant form daughter 6/15/2015 (on HD pre-transplant x 2.5 years), CKD stage 3, DM-2, HTN, CAD (coronary artery disease), combined diastolic and systolic (EF 40%) CHF, Chronic venous insufficiency of lower extremity, Diabetic retinopathy, Gallbladder polyp, 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, History of colon polyps, HPTH (hyperparathyroidism), Hyperlipidemia, LBBB (left bundle branch block) (12/20/2021), Morbid obesity with BMI of 45.0-49.9, adult, PCO (posterior capsular opacification), left (3/4/2019), Sleep apnea,     PAST SURGICAL HISTORY:  He  has a past surgical history that includes Retinal laser procedure; Cardiac catheterization (2008); Kidney transplant; and Colonoscopy (N/A, 4/5/2018).     SOCIAL HISTORY:  He  reports that he quit smoking about 8 years ago. He  "quit smokeless tobacco use about 8 years ago. He reports that he does not drink alcohol and does not use drugs.     FAMILY MEDICAL HISTORY:  His family history includes Diabetes in his maternal grandmother, mother, and sister; Heart failure in his mother; Hypertension in his mother; Kidney disease in his sister.               Review of patient's allergies indicates:   Allergen Reactions    Lisinopril Other (See Comments)       Other reaction(s):  cough    Actos  [pioglitazone]         Other reaction(s): chf    Metformin         Other reaction(s): renal insuff  Other reaction(s): chf      Meds reviewed    Current Outpatient Medications:     apixaban (ELIQUIS) 5 mg Tab, Take 1 tablet (5 mg total) by mouth 2 (two) times daily., Disp: 180 tablet, Rfl: 1    aspirin (ECOTRIN) 81 MG EC tablet, Take 1 tablet by mouth Daily. , Disp: , Rfl:     BD ULTRA-FINE MINI PEN NEEDLE 31 gauge x 3/16" Ndle, Use to inject insulin as needed up to 4 times daily, Disp: 100 each, Rfl: 3    blood sugar diagnostic Strp, Use to test four times per day, Disp: 150 strip, Rfl: 11    blood sugar diagnostic Strp, 100 each by Misc.(Non-Drug; Combo Route) route 4 (four) times daily before meals and nightly., Disp: 100 each, Rfl: 1    blood-glucose meter (FREESTYLE LITE METER) kit, 1 each 4 (four) times daily., Disp: 1 each, Rfl: 0    calcitRIOL (ROCALTROL) 0.25 MCG Cap, Take 1 capsule (0.25 mcg total) by mouth once daily., Disp: 30 capsule, Rfl: 11    diclofenac sodium (VOLTAREN) 1 % Gel, Apply 2 g topically 3 (three) times daily., Disp: 450 g, Rfl: 2    famotidine (PEPCID) 20 MG tablet, TAKE ONE TABLET BY MOUTH EVERY EVENING, Disp: 30 tablet, Rfl: 11    fish oil-omega-3 fatty acids 300-1,000 mg capsule, Take 1 g by mouth once daily., Disp: , Rfl:     furosemide (LASIX) 80 MG tablet, Take one-half tablet (40 mg) by mouth 2 (two) times daily., Disp: 30 tablet, Rfl: 11    glimepiride (AMARYL) 2 MG tablet, Take 1 tablet (2 mg total) by mouth before " "breakfast., Disp: 90 tablet, Rfl: 3    insulin (LANTUS SOLOSTAR U-100 INSULIN) glargine 100 units/mL SubQ pen, Inject 35 Units into the skin 2 (two) times daily., Disp: 30 mL, Rfl: 2    insulin aspart U-100 (NOVOLOG FLEXPEN U-100 INSULIN) 100 unit/mL (3 mL) InPn pen, Inject 25 Units into the skin 3 (three) times daily with meals., Disp: 30 mL, Rfl: 2    ketoconazole (NIZORAL) 200 mg Tab, Take HALF A tablet (100 mg total) by mouth once daily., Disp: 15 tablet, Rfl: 9    lancets (MICROLET LANCET) Misc, 100 lancets by Misc.(Non-Drug; Combo Route) route 4 (four) times daily before meals and nightly., Disp: 100 each, Rfl: 11    magnesium oxide (MAG-OX) 400 mg (241.3 mg magnesium) tablet, Take 1 tablet (400 mg total) by mouth once daily., Disp: 90 tablet, Rfl: 1    metOLazone (ZAROXOLYN) 2.5 MG tablet, Take 1 tablet by mouth on Monday and Friday as directed, Disp: 30 tablet, Rfl: 11    metoprolol succinate (TOPROL-XL) 25 MG 24 hr tablet, Take 1 tablet (25 mg total) by mouth once daily., Disp: 90 tablet, Rfl: 1    montelukast (SINGULAIR) 10 mg tablet, Take 1 tablet (10 mg total) by mouth every evening., Disp: 90 tablet, Rfl: 1    multivitamin (THERAGRAN) tablet, Take 1 tablet by mouth once daily., Disp: , Rfl:     mupirocin (BACTROBAN) 2 % ointment, Apply topically 3 (three) times daily. Use as directed on the leg wound., Disp: 22 g, Rfl: 1    mycophenolate (CELLCEPT) 250 mg Cap, Take 3 capsules (750 mg total) by mouth 2 (two) times daily. (Patient taking differently: Take 750 mg by mouth 2 (two) times daily. Taking 3 bid), Disp: 180 capsule, Rfl: 11    ondansetron (ZOFRAN) 4 MG tablet, Take 1 tablet (4 mg total) by mouth every 6 (six) hours., Disp: 30 tablet, Rfl: 0    pen needle, diabetic (BD INSULIN PEN NEEDLE UF SHORT) 31 gauge x 5/16" Ndle, USE TO INJECT INSULIN TWICE A DAY, Disp: 200 each, Rfl: 5    potassium chloride SA (K-DUR,KLOR-CON) 20 MEQ tablet, Take 2 tablets (40 mEq total) by mouth once daily., Disp: 180 " tablet, Rfl: 1    predniSONE (DELTASONE) 5 MG tablet, Take 1 tablet (5 mg total) by mouth once daily., Disp: 30 tablet, Rfl: 11    promethazine-dextromethorphan (PROMETHAZINE-DM) 6.25-15 mg/5 mL Syrp, Take 5 mLs by mouth every 6 (six) hours as needed (cough)., Disp: 180 mL, Rfl: 0    rosuvastatin (CRESTOR) 40 MG Tab, Take 1 tablet (40 mg total) by mouth once daily in the evening., Disp: 90 tablet, Rfl: 1    sacubitriL-valsartan (ENTRESTO)  mg per tablet, Take 1 tablet by mouth 2 (two) times daily., Disp: 180 tablet, Rfl: 1    tacrolimus (PROGRAF) 1 MG Cap, Take 4 capsules (4 mg total) by mouth every 12 (twelve) hours., Disp: 240 capsule, Rfl: 11    triamcinolone acetonide 0.1% (KENALOG) 0.1 % ointment, Apply topically 2 (two) times daily. Use on bilateral lower legs., Disp: 454 g, Rfl: 1    tamsulosin (FLOMAX) 0.4 mg Cap, Take 1 capsule (0.4 mg total) by mouth once daily. (Patient not taking: Reported on 4/4/2023), Disp: 30 capsule, Rfl: 11    valGANciclovir (VALCYTE) 450 mg Tab, Take 1 tablet (450 mg total) by mouth once daily. for 21 days, Disp: 21 tablet, Rfl: 0        REVIEW OF SYSTEMS:  Patient has no fever, fatigue, visual changes, chest pain, edema, cough, dyspnea, nausea, vomiting, constipation, diarrhea, arthralgias, pruritis, dizziness, weakness, depression, confusion.     PHYSICAL EXAM:  Blood pressure 110/60, pulse 100, resp. rate 20, weight 287 lbs, from 283 lbs, from 279 lbs.  Gen:    WDWN male in no apparent distress  Psych: Normal mood and affect  Skin:    No rashes or ulcers  Neck:   No JVD  Chest:  Clear with no rales, rhonchi, wheezing with normal effort  CV:      Regular with no murmurs, gallops or rubs  Abd:     Soft, nontender, no distension  Ext:      1+ edema     Labs reviewed  BMP  Lab Results   Component Value Date     03/28/2023    K 4.3 03/28/2023     03/28/2023    CO2 29 03/28/2023    BUN 47 (H) 03/28/2023    CREATININE 2.6 (H) 03/28/2023    CALCIUM 9.3 03/28/2023     ANIONGAP 12 03/28/2023    EGFRNORACEVR 25.9 (A) 03/28/2023     Lab Results   Component Value Date    WBC 6.29 01/27/2023    HGB 10.2 (L) 01/27/2023    HCT 35.9 (L) 01/27/2023    MCV 90 01/27/2023     01/27/2023       Lab Results   Component Value Date    .6 (H) 11/14/2022    CALCIUM 9.3 03/28/2023    PHOS 3.4 01/27/2023     Last CMV titer by PCR was negative on 12/30/22, prior to that positive     Prograf level pending     Cardiac study: 1/18/22 reviewed:  Conclusion  Planar imaging demonstrates cardiac activity that is absent to that of the adjacent rib cage.  Study is negative for TTR amyloidosis with an uptake ratio of 1.03.  Perugini Score of 0     Echo 7/27/21 reviewed:  The left ventricle is normal in size with concentric hypertrophy and mildly decreased systolic function.  The estimated ejection fraction is 40%.  Normal right ventricular size with normal right ventricular systolic function.  Indeterminate left ventricular diastolic function.  Moderate left atrial enlargement.           IMPRESSION AND RECOMMENDATIONS:  66 y/o male with h/o of kidney transplant present for post hospital f/u after having ANGELITO and diarrhea:     1. Renal: ANGELITO resolved Cr closer to prior baseline  Currently stable kidney function. CKD stage 4  ANGELITO was due to GI losses    Cr tends to fluctuate, but overall slowly worsening  Reason for s Cr fluctuations is the use of diuretics and h/o of CHF. Pre-renal azotemia  OK to continue diuretics (despite changes in Cr) as pt will need diuretics for fluid balance  The dose of the diuretics should be proportional to the amount of fluid gain  Again advised pt of the reason for fluid control, and avoiding salty foods.  Advised pt to limit fluid intake so that he will need less diuretics, so that s Cr will go fluctuate so much     H/o of DM and HTN  Was on HD x 2.5 years before transplant     Complications of CKD:  K normal  Na normal  Acid base stable, metabolic alkalosis due to  diuretics  Anemia, mild  Ca normal  PO4 stable  Secondary hyperparathyroidism, on calcitriol     2. Immunosuppression  H/o of living related kidney transplant in 2015 form daughter  Prograf level pending  Will chart check and inform the pt  No change in prograf dose for now  Meds and doses reviewed with pt and his daughter     3. Recent diarrhea in Dec 2022. Now resolved  Positive CMV titer by PCR  Did not have colonoscopy  Was presumably treated for CMV colitis with valcyte with immediate good reesults  Discussed with transplant in NO  If re-occurs, will do colonoscopy    4. Cardiac: has diastolic and systolic CHF  Stable fluid status at present  Continue current treatment  Limit salt in diet <2-3 g/day  Limit fluids < 1.5 L/day  Continue lasix 40 mg po bid (dose says 80 mg bid)  Metolazone prn  Agree with cardiology mgmt     Previously cardiac amyloid was suspected, was ruled out by above cardiac test  SPEP and UPEP unremarkable, no monoclonality     5. HTN: BP normal to low  Meds reviewed      Plans and recommendations:  As discussed above  Total time spent 40 minutes including time needed to review the records, the   patient evaluation, documentation, face-to-face discussion with the patient,   more than 50% of the time was spent on coordination of care and counseling.    Level V visit.  RTC 2 months     Hany Gonsalez MD

## 2023-04-05 LAB
CYTOMEGALOVIRUS LOG (IU/ML): 1.89 LOGIU/ML
CYTOMEGALOVIRUS PCR, QUANT: 78 IU/ML
TACROLIMUS BLD-MCNC: 8.1 NG/ML (ref 5–15)

## 2023-04-05 RX ORDER — VALGANCICLOVIR 450 MG/1
450 TABLET, FILM COATED ORAL EVERY OTHER DAY
Qty: 45 TABLET | Refills: 0 | Status: SHIPPED | OUTPATIENT
Start: 2023-04-05 | End: 2023-07-05 | Stop reason: SDUPTHER

## 2023-04-05 NOTE — PROGRESS NOTES
Renal clinic chart check:  Noted CMV titer by PCR is detectible again (has cleared in end of Dec 2022).  Pt was called. Denies any further diarrhea  CMV status of D/R unknown.  Will treat with renally adjusted dose of valcyte 450 mg po qod x 3 months.  Will refer for colonoscopy    Hany Gonsalez MD

## 2023-04-06 DIAGNOSIS — Z94.0 KIDNEY REPLACED BY TRANSPLANT: ICD-10-CM

## 2023-04-06 RX ORDER — MYCOPHENOLATE MOFETIL 250 MG/1
750 CAPSULE ORAL 2 TIMES DAILY
Qty: 180 CAPSULE | Refills: 11 | Status: SHIPPED | OUTPATIENT
Start: 2023-04-06 | End: 2023-06-28

## 2023-04-15 DIAGNOSIS — Z94.0 KIDNEY REPLACED BY TRANSPLANT: ICD-10-CM

## 2023-04-18 ENCOUNTER — OFFICE VISIT (OUTPATIENT)
Dept: ORTHOPEDICS | Facility: CLINIC | Age: 68
End: 2023-04-18
Payer: COMMERCIAL

## 2023-04-18 ENCOUNTER — OFFICE VISIT (OUTPATIENT)
Dept: PHYSICAL MEDICINE AND REHAB | Facility: CLINIC | Age: 68
End: 2023-04-18
Payer: MEDICARE

## 2023-04-18 VITALS
BODY MASS INDEX: 42.22 KG/M2 | SYSTOLIC BLOOD PRESSURE: 122 MMHG | HEIGHT: 67 IN | WEIGHT: 269 LBS | HEART RATE: 103 BPM | RESPIRATION RATE: 14 BRPM | DIASTOLIC BLOOD PRESSURE: 72 MMHG

## 2023-04-18 VITALS — WEIGHT: 268.94 LBS | BODY MASS INDEX: 42.21 KG/M2 | HEIGHT: 67 IN

## 2023-04-18 DIAGNOSIS — M54.12 CERVICAL RADICULOPATHY: Primary | ICD-10-CM

## 2023-04-18 DIAGNOSIS — G56.03 BILATERAL CARPAL TUNNEL SYNDROME: ICD-10-CM

## 2023-04-18 DIAGNOSIS — G62.9 POLYNEUROPATHY: ICD-10-CM

## 2023-04-18 DIAGNOSIS — E11.42 DIABETIC SENSORIMOTOR POLYNEUROPATHY: ICD-10-CM

## 2023-04-18 PROCEDURE — 1159F MED LIST DOCD IN RCRD: CPT | Mod: CPTII,S$GLB,, | Performed by: ORTHOPAEDIC SURGERY

## 2023-04-18 PROCEDURE — 3008F PR BODY MASS INDEX (BMI) DOCUMENTED: ICD-10-PCS | Mod: CPTII,S$GLB,, | Performed by: PHYSICAL MEDICINE & REHABILITATION

## 2023-04-18 PROCEDURE — 3078F DIAST BP <80 MM HG: CPT | Mod: CPTII,S$GLB,, | Performed by: PHYSICAL MEDICINE & REHABILITATION

## 2023-04-18 PROCEDURE — 1100F PTFALLS ASSESS-DOCD GE2>/YR: CPT | Mod: CPTII,S$GLB,, | Performed by: ORTHOPAEDIC SURGERY

## 2023-04-18 PROCEDURE — 1125F PR PAIN SEVERITY QUANTIFIED, PAIN PRESENT: ICD-10-PCS | Mod: CPTII,S$GLB,, | Performed by: ORTHOPAEDIC SURGERY

## 2023-04-18 PROCEDURE — 3066F NEPHROPATHY DOC TX: CPT | Mod: CPTII,S$GLB,, | Performed by: ORTHOPAEDIC SURGERY

## 2023-04-18 PROCEDURE — 1100F PR PT FALLS ASSESS DOC 2+ FALLS/FALL W/INJURY/YR: ICD-10-PCS | Mod: CPTII,S$GLB,, | Performed by: ORTHOPAEDIC SURGERY

## 2023-04-18 PROCEDURE — 3008F PR BODY MASS INDEX (BMI) DOCUMENTED: ICD-10-PCS | Mod: CPTII,S$GLB,, | Performed by: ORTHOPAEDIC SURGERY

## 2023-04-18 PROCEDURE — 3078F PR MOST RECENT DIASTOLIC BLOOD PRESSURE < 80 MM HG: ICD-10-PCS | Mod: CPTII,S$GLB,, | Performed by: PHYSICAL MEDICINE & REHABILITATION

## 2023-04-18 PROCEDURE — 3066F PR DOCUMENTATION OF TREATMENT FOR NEPHROPATHY: ICD-10-PCS | Mod: CPTII,S$GLB,, | Performed by: PHYSICAL MEDICINE & REHABILITATION

## 2023-04-18 PROCEDURE — 4010F ACE/ARB THERAPY RXD/TAKEN: CPT | Mod: CPTII,S$GLB,, | Performed by: ORTHOPAEDIC SURGERY

## 2023-04-18 PROCEDURE — 99999 PR PBB SHADOW E&M-EST. PATIENT-LVL III: ICD-10-PCS | Mod: PBBFAC,,, | Performed by: PHYSICAL MEDICINE & REHABILITATION

## 2023-04-18 PROCEDURE — 99499 UNLISTED E&M SERVICE: CPT | Mod: S$GLB,,, | Performed by: PHYSICAL MEDICINE & REHABILITATION

## 2023-04-18 PROCEDURE — 1159F MED LIST DOCD IN RCRD: CPT | Mod: CPTII,S$GLB,, | Performed by: PHYSICAL MEDICINE & REHABILITATION

## 2023-04-18 PROCEDURE — 99499 NO LOS: ICD-10-PCS | Mod: S$GLB,,, | Performed by: PHYSICAL MEDICINE & REHABILITATION

## 2023-04-18 PROCEDURE — 1159F PR MEDICATION LIST DOCUMENTED IN MEDICAL RECORD: ICD-10-PCS | Mod: CPTII,S$GLB,, | Performed by: ORTHOPAEDIC SURGERY

## 2023-04-18 PROCEDURE — 3044F HG A1C LEVEL LT 7.0%: CPT | Mod: CPTII,S$GLB,, | Performed by: PHYSICAL MEDICINE & REHABILITATION

## 2023-04-18 PROCEDURE — 99213 OFFICE O/P EST LOW 20 MIN: CPT | Mod: S$GLB,,, | Performed by: ORTHOPAEDIC SURGERY

## 2023-04-18 PROCEDURE — 1160F PR REVIEW ALL MEDS BY PRESCRIBER/CLIN PHARMACIST DOCUMENTED: ICD-10-PCS | Mod: CPTII,S$GLB,, | Performed by: PHYSICAL MEDICINE & REHABILITATION

## 2023-04-18 PROCEDURE — 3288F FALL RISK ASSESSMENT DOCD: CPT | Mod: CPTII,S$GLB,, | Performed by: ORTHOPAEDIC SURGERY

## 2023-04-18 PROCEDURE — 99999 PR PBB SHADOW E&M-EST. PATIENT-LVL IV: ICD-10-PCS | Mod: PBBFAC,,, | Performed by: ORTHOPAEDIC SURGERY

## 2023-04-18 PROCEDURE — 99213 PR OFFICE/OUTPT VISIT, EST, LEVL III, 20-29 MIN: ICD-10-PCS | Mod: S$GLB,,, | Performed by: ORTHOPAEDIC SURGERY

## 2023-04-18 PROCEDURE — 99999 PR PBB SHADOW E&M-EST. PATIENT-LVL III: CPT | Mod: PBBFAC,,, | Performed by: PHYSICAL MEDICINE & REHABILITATION

## 2023-04-18 PROCEDURE — 1159F PR MEDICATION LIST DOCUMENTED IN MEDICAL RECORD: ICD-10-PCS | Mod: CPTII,S$GLB,, | Performed by: PHYSICAL MEDICINE & REHABILITATION

## 2023-04-18 PROCEDURE — 3066F NEPHROPATHY DOC TX: CPT | Mod: CPTII,S$GLB,, | Performed by: PHYSICAL MEDICINE & REHABILITATION

## 2023-04-18 PROCEDURE — 3066F PR DOCUMENTATION OF TREATMENT FOR NEPHROPATHY: ICD-10-PCS | Mod: CPTII,S$GLB,, | Performed by: ORTHOPAEDIC SURGERY

## 2023-04-18 PROCEDURE — 4010F PR ACE/ARB THEARPY RXD/TAKEN: ICD-10-PCS | Mod: CPTII,S$GLB,, | Performed by: PHYSICAL MEDICINE & REHABILITATION

## 2023-04-18 PROCEDURE — 95886 MUSC TEST DONE W/N TEST COMP: CPT | Mod: S$GLB,,, | Performed by: PHYSICAL MEDICINE & REHABILITATION

## 2023-04-18 PROCEDURE — 1125F AMNT PAIN NOTED PAIN PRSNT: CPT | Mod: CPTII,S$GLB,, | Performed by: ORTHOPAEDIC SURGERY

## 2023-04-18 PROCEDURE — 95912 NRV CNDJ TEST 11-12 STUDIES: CPT | Mod: S$GLB,,, | Performed by: PHYSICAL MEDICINE & REHABILITATION

## 2023-04-18 PROCEDURE — 99999 PR PBB SHADOW E&M-EST. PATIENT-LVL IV: CPT | Mod: PBBFAC,,, | Performed by: ORTHOPAEDIC SURGERY

## 2023-04-18 PROCEDURE — 1160F RVW MEDS BY RX/DR IN RCRD: CPT | Mod: CPTII,S$GLB,, | Performed by: PHYSICAL MEDICINE & REHABILITATION

## 2023-04-18 PROCEDURE — 1160F RVW MEDS BY RX/DR IN RCRD: CPT | Mod: CPTII,S$GLB,, | Performed by: ORTHOPAEDIC SURGERY

## 2023-04-18 PROCEDURE — 1125F PR PAIN SEVERITY QUANTIFIED, PAIN PRESENT: ICD-10-PCS | Mod: CPTII,S$GLB,, | Performed by: PHYSICAL MEDICINE & REHABILITATION

## 2023-04-18 PROCEDURE — 3008F BODY MASS INDEX DOCD: CPT | Mod: CPTII,S$GLB,, | Performed by: PHYSICAL MEDICINE & REHABILITATION

## 2023-04-18 PROCEDURE — 3074F PR MOST RECENT SYSTOLIC BLOOD PRESSURE < 130 MM HG: ICD-10-PCS | Mod: CPTII,S$GLB,, | Performed by: PHYSICAL MEDICINE & REHABILITATION

## 2023-04-18 PROCEDURE — 1125F AMNT PAIN NOTED PAIN PRSNT: CPT | Mod: CPTII,S$GLB,, | Performed by: PHYSICAL MEDICINE & REHABILITATION

## 2023-04-18 PROCEDURE — 3044F HG A1C LEVEL LT 7.0%: CPT | Mod: CPTII,S$GLB,, | Performed by: ORTHOPAEDIC SURGERY

## 2023-04-18 PROCEDURE — 3074F SYST BP LT 130 MM HG: CPT | Mod: CPTII,S$GLB,, | Performed by: PHYSICAL MEDICINE & REHABILITATION

## 2023-04-18 PROCEDURE — 95886 PR EMG COMPLETE, W/ NERVE CONDUCTION STUDIES, 5+ MUSCLES: ICD-10-PCS | Mod: S$GLB,,, | Performed by: PHYSICAL MEDICINE & REHABILITATION

## 2023-04-18 PROCEDURE — 3288F PR FALLS RISK ASSESSMENT DOCUMENTED: ICD-10-PCS | Mod: CPTII,S$GLB,, | Performed by: ORTHOPAEDIC SURGERY

## 2023-04-18 PROCEDURE — 4010F PR ACE/ARB THEARPY RXD/TAKEN: ICD-10-PCS | Mod: CPTII,S$GLB,, | Performed by: ORTHOPAEDIC SURGERY

## 2023-04-18 PROCEDURE — 3044F PR MOST RECENT HEMOGLOBIN A1C LEVEL <7.0%: ICD-10-PCS | Mod: CPTII,S$GLB,, | Performed by: ORTHOPAEDIC SURGERY

## 2023-04-18 PROCEDURE — 3044F PR MOST RECENT HEMOGLOBIN A1C LEVEL <7.0%: ICD-10-PCS | Mod: CPTII,S$GLB,, | Performed by: PHYSICAL MEDICINE & REHABILITATION

## 2023-04-18 PROCEDURE — 95912 PR NERVE CONDUCTION STUDY; 11 -12 STUDIES: ICD-10-PCS | Mod: S$GLB,,, | Performed by: PHYSICAL MEDICINE & REHABILITATION

## 2023-04-18 PROCEDURE — 1160F PR REVIEW ALL MEDS BY PRESCRIBER/CLIN PHARMACIST DOCUMENTED: ICD-10-PCS | Mod: CPTII,S$GLB,, | Performed by: ORTHOPAEDIC SURGERY

## 2023-04-18 PROCEDURE — 4010F ACE/ARB THERAPY RXD/TAKEN: CPT | Mod: CPTII,S$GLB,, | Performed by: PHYSICAL MEDICINE & REHABILITATION

## 2023-04-18 PROCEDURE — 3008F BODY MASS INDEX DOCD: CPT | Mod: CPTII,S$GLB,, | Performed by: ORTHOPAEDIC SURGERY

## 2023-04-18 RX ORDER — TACROLIMUS 1 MG/1
4 CAPSULE ORAL EVERY 12 HOURS
Qty: 240 CAPSULE | Refills: 11 | Status: SHIPPED | OUTPATIENT
Start: 2023-04-18 | End: 2023-10-12

## 2023-04-18 RX ORDER — AMITRIPTYLINE HYDROCHLORIDE 25 MG/1
25 TABLET, FILM COATED ORAL NIGHTLY PRN
Qty: 30 TABLET | Refills: 2 | Status: SHIPPED | OUTPATIENT
Start: 2023-04-18

## 2023-04-18 NOTE — PROGRESS NOTES
Full Name: Mitch Whittaker YOB: 1955  Patient ID: [id number]      Visit Date: 4/18/2023 10:33  Age: 67 Years  Examining Physician: Dr Valerio   Referring Physician: Dr Almonte  Conclusion: upper ext      Chief Complaint   Patient presents with    Numbness     hands       HPI: This is a 67 y.o.  male being seen in clinic today for evaluation of chronic hand numbness/tingling and achy pain-left worse than right.  He has limited ROM, achy pain, and weakness-mostly on the left.  Ice and rest provide some relief. He wears a brace on the right due to similar issues. He reports a history of neck pain with pain radiating into his arms.    History obtained from patient    Past family, medical, social, and surgical history reviewed in chart    Review of Systems:     General- denies lethargy, weight change, fever, chills  Head/neck- denies swallowing difficulties  ENT- denies hearing changes  Cardiovascular-denies chest pain  Pulmonary- denies shortness of breath  GI- denies constipation or bowel incontinence  - denies bladder incontinence+ESRD , h.o transplant  Skin- denies wounds or rashes  Musculoskeletal- denies weakness, + pain  Neurologic- + numbness and tingling  Psychiatric- denies depressive or psychotic features, denies anxiety  Lymphatic-denies swelling  Endocrine- denies hypoglycemic symptoms/+DM history  All other pertinent systems negative     Physical Examination:  General: Well developed, well nourished male, NAD  HEENT:NCAT EOMI bilaterally   Pulmonary:Normal respirations    Spinal Examination: CERVICAL  Active ROM is within normal limits.  Inspection: No deformity of spinal alignment.    Musculoskeletal Tests:  Phalen +bilaterally  Elbow compression (ulnar): neg  Tinels at wrist: neg    Bilateral Upper and Lower Extremities:  Pulses are 2+ at radial bilaterally.  Shoulder/Elbow/Wrist/Hand ROM wnl  Hip/Knee/Ankle ROM   Bilateral Extremities show normal capillary refill.  No signs of  cyanosis, rubor, edema, skin changes, or dysvascular changes of appendages.  Nails appear intact.    Neurological Exam:  Cranial Nerves:  II-XII grossly intact    Manual Muscle Testing: (Motor 5=normal)  5/5 strength bilateral upper extremities    No focal atrophy is noted of either upper extremity except mild at right FDI    Bilateral Reflexes:  Heredia's response is absent bilaterally.    Sensation: tested to light touch  - intact in arms except dec at left 3rd digit    Gait: Narrow base and good arm swing.      Entire procedure explained to patient prior to proceeding.  Verbal consent obtained        Sensory NCS      Nerve / Sites Rec. Site Onset Lat Peak Lat NP Amp PP Amp Segments Distance Velocity     ms ms µV µV  mm m/s   L Median - Digit II (Antidromic)      Wrist Dig II 3.77 4.73 15.4 17.7 Wrist - Dig  37   R Median - Digit II (Antidromic)      Wrist Dig II 3.46 4.33 13.6 20.8 Wrist - Dig  40   L Ulnar - Digit V (Antidromic)      Wrist Dig V 3.04 4.50 8.5 11.9 Wrist - Dig V 140 46   R Ulnar - Digit V (Antidromic)      Wrist Dig V 3.44 4.23 5.0 4.5 Wrist - Dig V 140 41   L Radial - Anatomical snuff box (Forearm)      Forearm Wrist 2.88 3.83 13.4 4.7 Forearm - Wrist 100 35   R Radial - Anatomical snuff box (Forearm)      Forearm Wrist 2.60 3.40 5.7 10.8 Forearm - Wrist 100 38                   Combined Sensory Index      Nerve / Sites Rec. Site Peak Lat NP Amp PP Amp Segments Peak Diff     ms µV µV  ms   L Median - CSI      Median Thumb 3.58 8.3 10.9 Median - Radial 0.27      Radial Thumb 3.31 8.8 10.4 Median - Ulnar 0.48      Median Ring 5.65 4.2 1.3 Median palm - Ulnar palm 0.31      Ulnar Ring 5.17 6.5 4.8        Median palm Wrist 2.79 18.5 8.6        Ulnar palm Wrist 2.48 6.2         CSI     CSI 1.06         Motor NCS      Nerve / Sites Muscle Latency Amplitude Amp % Duration Segments Distance Lat Diff Velocity     ms mV % ms  mm ms m/s   L Median - APB      Wrist APB 4.67 4.0 100 6.58 Wrist -  APB 80        Elbow APB 10.94 2.9 71.5 6.54 Elbow - Wrist 250 6.27 40   R Median - APB      Wrist APB 4.15 4.9 100 6.92 Wrist - APB 80        Elbow APB 10.15 2.4 48.4 6.67 Elbow - Wrist 260 6.00 43   L Ulnar - ADM      Wrist ADM 3.79 3.4 100 7.52 Wrist - ADM 80        B.Elbow ADM 9.54 1.8 51.8  B.Elbow - Wrist 260 5.75 45      A.Elbow ADM 11.92 1.6 48  A.Elbow - B.Elbow 110 2.38 46   R Ulnar - ADM      Wrist ADM 4.56 1.9 100 5.06 Wrist - ADM 80        B.Elbow ADM 12.50 1.4 73  B.Elbow - Wrist 260 7.94 33      A.Elbow ADM 15.23 1.2 63.7  A.Elbow - B.Elbow 100 2.73 37               EMG Summary Table     Spontaneous MUAP Recruitment   Muscle Nerve Roots IA Fib PSW Fasc H.F. Amp Dur. PPP Pattern   R. First dorsal interosseous Ulnar C8-T1 1- 1+ None None None N N 1+ Reduced   R. Abductor pollicis brevis Median C8-T1 1+ None None None None N N 1+ 1+   R. Flexor carpi ulnaris Ulnar C7-T1 1+ 1+ 1+ None None N N 1+ 1+   R. Pronator teres Median C6-C7 N None None None None N N N N   R. Deltoid Axillary C5-C6 N None None None None N N N N   L. First dorsal interosseous Ulnar C8-T1 1+ 1+ 1+ None None N 1+ 1+ 1+   L. Abductor pollicis brevis Median C8-T1 1+ 1+ 2+ None None N 1+ 1+ 1+   L. Pronator teres Median C6-C7 N None None None None N N N N   L. Deltoid Axillary C5-C6 N None None None None N N N N       INTERPRETATION  -Bilateral median motor nerve conduction study showed prolonged latency on the left, dec prox amplitude, and dec conduction velocity  -Bilateral median sensory nerve conduction study showed prolonged peak latency and normal amplitude  -Bilateral ulnar motor nerve conduction study showed prolonged latency, dec amplitude, and dec conduction velocity  -Bilateral ulnar sensory nerve conduction study showed prolonged peak latency and dec amplitude on the right  -Bilateral radial sensory nerve conduction study showed prolonged peak latency and amplitude  -left combined sensory index was significant  -Needle EMG  examination performed to above mentioned muscles       IMPRESSION  ABNORMAL study  2. There is electrodiagnostic evidence of a moderate demyelinating median neuropathy (Carpal tunnel syndrome) across bilateral wrists, an acute on chronic radiculopathy of the bilateral C8, T1 nerve roots, and an underlying diabetic sensorimotor polyneuropathy    PLAN  Discussed in detail for greater than 30 minutes about diagnosis and treatment plan    1. Follow up with referring provider: Dr. Noel Cordoba*  2. Handouts on CTS, radic, and neuropathy provided  3. This study is good for one year. If symptoms worsen or do not improve, please re-consult.    Chiqui Valerio M.D.  Physical Medicine and Rehab

## 2023-04-18 NOTE — PROGRESS NOTES
Subjective:     Patient ID: Mitch Whittaker is a 67 y.o. male.    Chief Complaint: Pain of the Left Wrist, Pain of the Right Wrist, Pain of the Right Hand, and Pain of the Left Hand      HPI:  The patient is a 67-year-old male with bilateral carpal tunnel syndrome as well as C8-T1 cervical radiculopathy and diabetic polyneuropathy documented by nerve conduction studies.  He has not tried splinting or injection or surgery.  He wishes to avoid injection as he has a difficult time controlling his sugar.  He wishes to try a wrist splint and Elavil for his polyneuropathy.    Past Medical History:   Diagnosis Date    Acquired renal cyst of left kidney     Anemia associated with chronic renal failure     CAD (coronary artery disease)     nonobstructive lhc 9/14    CHF (congestive heart failure)     Chronic immunosuppression with Prograf and MMF 06/18/2015    Chronic venous insufficiency of lower extremity     CKD (chronic kidney disease) stage 3, GFR 30-59 ml/min     Diabetic retinopathy     DM (diabetes mellitus), type 2 with complications 1994    Edema     End stage kidney disease     s/p transplant, doing well    Gallbladder polyp     Heart failure, diastolic, due to HTN     Hemodialysis status     off since transplant    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 RNA NEGATIVE 5/2015, 2021, 2022    History of colon polyps     HPTH (hyperparathyroidism)     Hyperlipidemia     Hypertension associated with stage 3 chronic kidney disease due to type 2 diabetes mellitus     LBBB (left bundle branch block) 12/20/2021    Morbid obesity with BMI of 45.0-49.9, adult     PCO (posterior capsular opacification), left 03/04/2019    Proteinuria     resolved s/p transplant    S/P kidney transplant     Sleep apnea     Type 2 diabetes, uncontrolled, with retinopathy     Type II diabetes mellitus with renal manifestations      Past Surgical History:   Procedure Laterality Date    CARDIAC CATHETERIZATION  2008  "   normal coronary    COLONOSCOPY N/A 2018    Procedure: COLONOSCOPY;  Surgeon: Chava Ronquillo MD;  Location: [facility] ENDO;  Service: Endoscopy;  Laterality: N/A;    COLONOSCOPY N/A 2022    Procedure: COLONOSCOPY;  Surgeon: Alix Puente MD;  Location: [facility] ENDO;  Service: Endoscopy;  Laterality: N/A;    KIDNEY TRANSPLANT      RETINAL LASER PROCEDURE       Family History   Problem Relation Age of Onset    Diabetes Mother     Hypertension Mother     Heart failure Mother     Kidney disease Sister         ESRD    Diabetes Sister     Diabetes Maternal Grandmother     Cancer Neg Hx      Social History     Socioeconomic History    Marital status:     Number of children: 2   Occupational History    Occupation: retired     Employer: Retired   Tobacco Use    Smoking status: Former     Types: Cigarettes     Quit date: 2013     Years since quittin.9     Passive exposure: Past    Smokeless tobacco: Former     Quit date: 2013    Tobacco comments:     used marijuana since 0511-0369, stopped after started dialysis   Substance and Sexual Activity    Alcohol use: No     Alcohol/week: 0.0 standard drinks    Drug use: No     Comment:      Sexual activity: Never   Social History Narrative    . Lives with spouse. Has 2 children. Patient retired as  for [employer]. He has been washing cars.     Medication List with Changes/Refills   Current Medications    APIXABAN (ELIQUIS) 5 MG TAB    Take 1 tablet (5 mg total) by mouth 2 (two) times daily.    ASPIRIN (ECOTRIN) 81 MG EC TABLET    Take 1 tablet by mouth Daily.     BD ULTRA-FINE MINI PEN NEEDLE 31 GAUGE X 3/16" NDLE    Use to inject insulin as needed up to 4 times daily    BLOOD SUGAR DIAGNOSTIC STRP    Use to test four times per day    BLOOD SUGAR DIAGNOSTIC STRP    100 each by Misc.(Non-Drug; Combo Route) route 4 (four) times daily before meals and nightly.    BLOOD-GLUCOSE METER (FREESTYLE LITE METER) KIT    1 each 4 (four) " "times daily.    CALCITRIOL (ROCALTROL) 0.25 MCG CAP    Take 1 capsule (0.25 mcg total) by mouth once daily.    DICLOFENAC SODIUM (VOLTAREN) 1 % GEL    Apply 2 g topically 3 (three) times daily.    FAMOTIDINE (PEPCID) 20 MG TABLET    TAKE ONE TABLET BY MOUTH EVERY EVENING    FISH OIL-OMEGA-3 FATTY ACIDS 300-1,000 MG CAPSULE    Take 1 g by mouth once daily.    FUROSEMIDE (LASIX) 80 MG TABLET    Take one-half tablet (40 mg) by mouth 2 (two) times daily.    GLIMEPIRIDE (AMARYL) 2 MG TABLET    Take 1 tablet (2 mg total) by mouth before breakfast.    INSULIN (LANTUS SOLOSTAR U-100 INSULIN) GLARGINE 100 UNITS/ML SUBQ PEN    Inject 35 Units into the skin 2 (two) times daily.    INSULIN ASPART U-100 (NOVOLOG FLEXPEN U-100 INSULIN) 100 UNIT/ML (3 ML) INPN PEN    Inject 25 Units into the skin 3 (three) times daily with meals.    KETOCONAZOLE (NIZORAL) 200 MG TAB    Take HALF A tablet (100 mg total) by mouth once daily.    LANCETS (MICROLET LANCET) MISC    100 lancets by Misc.(Non-Drug; Combo Route) route 4 (four) times daily before meals and nightly.    MAGNESIUM OXIDE (MAG-OX) 400 MG (241.3 MG MAGNESIUM) TABLET    Take 1 tablet (400 mg total) by mouth once daily.    METOLAZONE (ZAROXOLYN) 2.5 MG TABLET    Take 1 tablet by mouth on Monday and Friday as directed    METOPROLOL SUCCINATE (TOPROL-XL) 25 MG 24 HR TABLET    Take 1 tablet (25 mg total) by mouth once daily.    MONTELUKAST (SINGULAIR) 10 MG TABLET    Take 1 tablet (10 mg total) by mouth every evening.    MULTIVITAMIN (THERAGRAN) TABLET    Take 1 tablet by mouth once daily.    MUPIROCIN (BACTROBAN) 2 % OINTMENT    Apply topically 3 (three) times daily. Use as directed on the leg wound.    MYCOPHENOLATE (CELLCEPT) 250 MG CAP    Take 3 capsules (750 mg total) by mouth 2 (two) times daily.    ONDANSETRON (ZOFRAN) 4 MG TABLET    Take 1 tablet (4 mg total) by mouth every 6 (six) hours.    PEN NEEDLE, DIABETIC (BD INSULIN PEN NEEDLE UF SHORT) 31 GAUGE X 5/16" NDLE    USE TO " INJECT INSULIN TWICE A DAY    POTASSIUM CHLORIDE SA (K-DUR,KLOR-CON) 20 MEQ TABLET    Take 2 tablets (40 mEq total) by mouth once daily.    PREDNISONE (DELTASONE) 5 MG TABLET    Take 1 tablet (5 mg total) by mouth once daily.    PROMETHAZINE-DEXTROMETHORPHAN (PROMETHAZINE-DM) 6.25-15 MG/5 ML SYRP    Take 5 mLs by mouth every 6 (six) hours as needed (cough).    ROSUVASTATIN (CRESTOR) 40 MG TAB    Take 1 tablet (40 mg total) by mouth once daily in the evening.    SACUBITRIL-VALSARTAN (ENTRESTO)  MG PER TABLET    Take 1 tablet by mouth 2 (two) times daily.    TACROLIMUS (PROGRAF) 1 MG CAP    Take 4 capsules (4 mg total) by mouth every 12 (twelve) hours.    TAMSULOSIN (FLOMAX) 0.4 MG CAP    Take 1 capsule (0.4 mg total) by mouth once daily.    TRIAMCINOLONE ACETONIDE 0.1% (KENALOG) 0.1 % OINTMENT    Apply topically 2 (two) times daily. Use on bilateral lower legs.    VALGANCICLOVIR (VALCYTE) 450 MG TAB    Take 1 tablet (450 mg total) by mouth once daily. for 21 days    VALGANCICLOVIR (VALCYTE) 450 MG TAB    Take 1 tablet (450 mg total) by mouth every other day.     Review of patient's allergies indicates:   Allergen Reactions    Lisinopril Other (See Comments)     Other reaction(s):  cough    Actos  [pioglitazone] Other (See Comments)     Other reaction(s): CHF    Metformin Other (See Comments)     Other reaction(s): renal insuff  Other reaction(s): CHF     Review of Systems   Constitutional: Negative for malaise/fatigue.   HENT:  Negative for hearing loss.    Eyes:  Positive for visual disturbance. Negative for double vision.   Cardiovascular:  Positive for chest pain and syncope.   Respiratory:  Positive for sleep disturbances due to breathing. Negative for shortness of breath.    Endocrine: Negative for cold intolerance.   Hematologic/Lymphatic: Does not bruise/bleed easily.   Skin:  Negative for poor wound healing and suspicious lesions.   Musculoskeletal:  Positive for falls and neck pain. Negative for gout,  joint pain and joint swelling.   Gastrointestinal:  Negative for nausea and vomiting.   Genitourinary:  Positive for flank pain, frequency, hesitancy, incomplete emptying and nocturia. Negative for dysuria.   Neurological:  Positive for dizziness, numbness, paresthesias and sensory change.   Psychiatric/Behavioral:  Negative for depression, memory loss and substance abuse. The patient is not nervous/anxious.    Allergic/Immunologic: Negative for persistent infections.     Objective:   Body mass index is 42.13 kg/m².  There were no vitals filed for this visit.             General    Constitutional: He is oriented to person, place, and time. He appears well-developed and well-nourished. No distress.   HENT:   Head: Normocephalic.   Eyes: EOM are normal.   Pulmonary/Chest: Effort normal.   Neurological: He is oriented to person, place, and time.   Psychiatric: He has a normal mood and affect.             Right Hand/Wrist Exam     Inspection   Scars: Wrist - absent Hand -  absent  Effusion: Wrist - absent Hand -  absent    Pain   Wrist - The patient exhibits pain of the flexor/pronator group.    Tests   Phalens sign: positive  Tinel's sign (median nerve): positive  Carpal Tunnel Compression Test: positive    Atrophy   Thenar:  negative  Intrinsic:  negative    Other     Neuorologic Exam    Median Distribution: abnormal  Ulnar Distribution: normal  Radial Distribution: normal    Comments:  The patient has a positive Tinel and positive Phalen sign.  There is no thenar atrophy noted.      Left Hand/Wrist Exam     Inspection   Scars: Wrist - absent Hand -  absent  Effusion: Wrist - absent Hand -  absent    Pain   Wrist - The patient exhibits pain of the flexor/pronator group.    Tests   Phalens sign: positive  Tinel's sign (median nerve): positive  Carpal Tunnel Compression Test: positive    Atrophy  Thenar:  Negative  Intrinsic: negative    Other     Sensory Exam  Median Distribution: abnormal  Ulnar Distribution:  normal  Radial Distribution: normal    Comments:  The patient has positive Tinel and positive Phalen sign.  There is no thenar atrophy noted.          Vascular Exam       Capillary Refill  Right Hand: normal capillary refill  Left Hand: normal capillary refill      radiographs were not obtained today  Assessment:     Encounter Diagnoses   Name Primary?    Cervical radiculopathy Yes    Bilateral carpal tunnel syndrome     Polyneuropathy         Plan:     The patient was given bilateral wrist splints.  He declined injection.  He wished to try Elavil was given 25 mg p.o. HS.  He will return in 4 weeks if not improved to consider arm carpal tunnel release.  He wishes to defer injection                Disclaimer: This note was prepared using a voice recognition system and is likely to have sound alike errors within the text.

## 2023-05-02 ENCOUNTER — PATIENT MESSAGE (OUTPATIENT)
Dept: PHARMACY | Facility: CLINIC | Age: 68
End: 2023-05-02
Payer: COMMERCIAL

## 2023-05-05 ENCOUNTER — TELEPHONE (OUTPATIENT)
Dept: NEPHROLOGY | Facility: CLINIC | Age: 68
End: 2023-05-05
Payer: COMMERCIAL

## 2023-05-09 ENCOUNTER — LAB VISIT (OUTPATIENT)
Dept: LAB | Facility: HOSPITAL | Age: 68
End: 2023-05-09
Attending: INTERNAL MEDICINE
Payer: COMMERCIAL

## 2023-05-09 DIAGNOSIS — Z94.0 KIDNEY TRANSPLANTED: ICD-10-CM

## 2023-05-09 LAB
ALBUMIN SERPL BCP-MCNC: 3.4 G/DL (ref 3.5–5.2)
ANION GAP SERPL CALC-SCNC: 11 MMOL/L (ref 8–16)
BUN SERPL-MCNC: 37 MG/DL (ref 8–23)
CALCIUM SERPL-MCNC: 9.4 MG/DL (ref 8.7–10.5)
CHLORIDE SERPL-SCNC: 103 MMOL/L (ref 95–110)
CO2 SERPL-SCNC: 29 MMOL/L (ref 23–29)
CREAT SERPL-MCNC: 2.8 MG/DL (ref 0.5–1.4)
EST. GFR  (NO RACE VARIABLE): 23.7 ML/MIN/1.73 M^2
GLUCOSE SERPL-MCNC: 144 MG/DL (ref 70–110)
PHOSPHATE SERPL-MCNC: 2.7 MG/DL (ref 2.7–4.5)
POTASSIUM SERPL-SCNC: 4 MMOL/L (ref 3.5–5.1)
SODIUM SERPL-SCNC: 143 MMOL/L (ref 136–145)

## 2023-05-09 PROCEDURE — 80069 RENAL FUNCTION PANEL: CPT | Performed by: INTERNAL MEDICINE

## 2023-05-09 PROCEDURE — 36415 COLL VENOUS BLD VENIPUNCTURE: CPT | Performed by: INTERNAL MEDICINE

## 2023-05-09 PROCEDURE — 85025 COMPLETE CBC W/AUTO DIFF WBC: CPT | Performed by: INTERNAL MEDICINE

## 2023-05-09 PROCEDURE — 80197 ASSAY OF TACROLIMUS: CPT | Performed by: INTERNAL MEDICINE

## 2023-05-10 ENCOUNTER — OFFICE VISIT (OUTPATIENT)
Dept: NEPHROLOGY | Facility: CLINIC | Age: 68
End: 2023-05-10
Payer: COMMERCIAL

## 2023-05-10 VITALS
DIASTOLIC BLOOD PRESSURE: 54 MMHG | BODY MASS INDEX: 42.6 KG/M2 | RESPIRATION RATE: 18 BRPM | WEIGHT: 271.38 LBS | HEART RATE: 100 BPM | SYSTOLIC BLOOD PRESSURE: 102 MMHG | HEIGHT: 67 IN

## 2023-05-10 DIAGNOSIS — Z94.0 KIDNEY TRANSPLANTED: ICD-10-CM

## 2023-05-10 DIAGNOSIS — A09 DIARRHEA OF INFECTIOUS ORIGIN: Primary | ICD-10-CM

## 2023-05-10 DIAGNOSIS — Z94.0 KIDNEY REPLACED BY TRANSPLANT: Primary | ICD-10-CM

## 2023-05-10 LAB
ANISOCYTOSIS BLD QL SMEAR: SLIGHT
BASOPHILS # BLD AUTO: 0.02 K/UL (ref 0–0.2)
BASOPHILS NFR BLD: 0.6 % (ref 0–1.9)
DIFFERENTIAL METHOD: ABNORMAL
EOSINOPHIL # BLD AUTO: 0 K/UL (ref 0–0.5)
EOSINOPHIL NFR BLD: 0.3 % (ref 0–8)
ERYTHROCYTE [DISTWIDTH] IN BLOOD BY AUTOMATED COUNT: 13.5 % (ref 11.5–14.5)
HCT VFR BLD AUTO: 34.7 % (ref 40–54)
HGB BLD-MCNC: 10.1 G/DL (ref 14–18)
IMM GRANULOCYTES # BLD AUTO: 0.05 K/UL (ref 0–0.04)
IMM GRANULOCYTES NFR BLD AUTO: 1.6 % (ref 0–0.5)
LYMPHOCYTES # BLD AUTO: 0.7 K/UL (ref 1–4.8)
LYMPHOCYTES NFR BLD: 22.1 % (ref 18–48)
MCH RBC QN AUTO: 25.8 PG (ref 27–31)
MCHC RBC AUTO-ENTMCNC: 29.1 G/DL (ref 32–36)
MCV RBC AUTO: 89 FL (ref 82–98)
MONOCYTES # BLD AUTO: 0.3 K/UL (ref 0.3–1)
MONOCYTES NFR BLD: 8.4 % (ref 4–15)
NEUTROPHILS # BLD AUTO: 2.1 K/UL (ref 1.8–7.7)
NEUTROPHILS NFR BLD: 67 % (ref 38–73)
NRBC BLD-RTO: 0 /100 WBC
OVALOCYTES BLD QL SMEAR: ABNORMAL
PLATELET # BLD AUTO: 199 K/UL (ref 150–450)
PLATELET BLD QL SMEAR: ABNORMAL
PMV BLD AUTO: 11.8 FL (ref 9.2–12.9)
POIKILOCYTOSIS BLD QL SMEAR: SLIGHT
RBC # BLD AUTO: 3.92 M/UL (ref 4.6–6.2)
TACROLIMUS BLD-MCNC: 11.3 NG/ML (ref 5–15)
WBC # BLD AUTO: 3.08 K/UL (ref 3.9–12.7)

## 2023-05-10 PROCEDURE — 99999 PR PBB SHADOW E&M-EST. PATIENT-LVL III: ICD-10-PCS | Mod: PBBFAC,,, | Performed by: INTERNAL MEDICINE

## 2023-05-10 PROCEDURE — 99999 PR PBB SHADOW E&M-EST. PATIENT-LVL III: CPT | Mod: PBBFAC,,, | Performed by: INTERNAL MEDICINE

## 2023-05-10 PROCEDURE — 99215 OFFICE O/P EST HI 40 MIN: CPT | Mod: S$GLB,,, | Performed by: INTERNAL MEDICINE

## 2023-05-10 PROCEDURE — 99213 OFFICE O/P EST LOW 20 MIN: CPT | Mod: PBBFAC | Performed by: INTERNAL MEDICINE

## 2023-05-10 PROCEDURE — 99215 PR OFFICE/OUTPT VISIT, EST, LEVL V, 40-54 MIN: ICD-10-PCS | Mod: S$GLB,,, | Performed by: INTERNAL MEDICINE

## 2023-05-10 NOTE — PROGRESS NOTES
Renal clinic f/u note:  Date of clinic visit: 5/10/23  Reason for f/u and chief c/o: h/o of kidney transplant. CHF, CKD. Recent diarrhea and positive CMV titer     HPI: Pt is a 68 y/o male with h/o of living related kidney transplant from his daughter in 2015 who presents for f/u. 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 He was last seen by us in 1 month ago. Chart was reviewed. Labs, meds, immunosuppressive meds and doses reviewed with pt. The prograf level is not a trough level and is about 6 hours post last dose. This is despite having explained to pt in layman's language how to obtain the prograf blood level correctly to reflect a 12 hour tough level.     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 Pt had been admitted to [facility] for ANGELITO (Cr 4.3) and diarrhea in Dec 2022. The cause for the latter was not clear. He did not have colonoscopy. CMV titer by PCR was positive and he was presumably treated for CMV colitis, inially with ganciclovir, then valcyte x 21 days on discharge. Diarrhea resolved right away after starting the anti-viral meds.Pt was d/koki on valcyte and took it x 21 days. CMV titer turned positive again in April 2023. Valcyte was re-started with renal dose adjustment. Pt has been taking it. He reports no diarrhea today. No new c/o's, no GI sx's. Pt also has a h/o of CHF. He has maintained his fluid intake, denies SOB, has mild leg swelling.         PAST MEDICAL HISTORY: living related kidney transplant form daughter 6/15/2015 (on HD pre-transplant x 2.5 years), CKD stage 3 to 4, DM-2, HTN, CAD (coronary artery disease), combined diastolic and systolic (EF 40%) CHF, Chronic venous insufficiency of lower extremity, Diabetic retinopathy, Gallbladder polyp, 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, History of colon polyps, HPTH (hyperparathyroidism), Hyperlipidemia, LBBB (left bundle branch block) (12/20/2021), Morbid obesity with BMI of  "45.0-49.9, adult, PCO (posterior capsular opacification), left (3/4/2019), Sleep apnea,     PAST SURGICAL HISTORY:  He  has a past surgical history that includes Retinal laser procedure; Cardiac catheterization (2008); Kidney transplant; and Colonoscopy (N/A, 4/5/2018).     SOCIAL HISTORY:  He  reports that he quit smoking about 8 years ago. He quit smokeless tobacco use about 8 years ago. He reports that he does not drink alcohol and does not use drugs.     FAMILY MEDICAL HISTORY:  His family history includes Diabetes in his maternal grandmother, mother, and sister; Heart failure in his mother; Hypertension in his mother; Kidney disease in his sister.               Review of patient's allergies indicates:   Allergen Reactions    Lisinopril Other (See Comments)       Other reaction(s):  cough    Actos  [pioglitazone]         Other reaction(s): chf    Metformin         Other reaction(s): renal insuff  Other reaction(s): chf      Meds reviewed     Current Outpatient Medications:     amitriptyline (ELAVIL) 25 MG tablet, Take 1 tablet (25 mg total) by mouth nightly as needed for Insomnia., Disp: 30 tablet, Rfl: 2    apixaban (ELIQUIS) 5 mg Tab, Take 1 tablet (5 mg total) by mouth 2 (two) times daily., Disp: 180 tablet, Rfl: 1    aspirin (ECOTRIN) 81 MG EC tablet, Take 1 tablet by mouth Daily. , Disp: , Rfl:     BD ULTRA-FINE MINI PEN NEEDLE 31 gauge x 3/16" Ndle, Use to inject insulin as needed up to 4 times daily, Disp: 100 each, Rfl: 3    blood sugar diagnostic Strp, Use to test four times per day, Disp: 150 strip, Rfl: 11    blood sugar diagnostic Strp, 100 each by Misc.(Non-Drug; Combo Route) route 4 (four) times daily before meals and nightly., Disp: 100 each, Rfl: 1    blood-glucose meter (FREESTYLE LITE METER) kit, 1 each 4 (four) times daily., Disp: 1 each, Rfl: 0    calcitRIOL (ROCALTROL) 0.25 MCG Cap, Take 1 capsule (0.25 mcg total) by mouth once daily., Disp: 30 capsule, Rfl: 11    diclofenac sodium " (VOLTAREN) 1 % Gel, Apply 2 g topically 3 (three) times daily., Disp: 450 g, Rfl: 2    famotidine (PEPCID) 20 MG tablet, TAKE ONE TABLET BY MOUTH EVERY EVENING, Disp: 30 tablet, Rfl: 11    fish oil-omega-3 fatty acids 300-1,000 mg capsule, Take 1 g by mouth once daily., Disp: , Rfl:     furosemide (LASIX) 80 MG tablet, Take one-half tablet (40 mg) by mouth 2 (two) times daily., Disp: 30 tablet, Rfl: 11    glimepiride (AMARYL) 2 MG tablet, Take 1 tablet (2 mg total) by mouth before breakfast., Disp: 90 tablet, Rfl: 3    insulin (LANTUS SOLOSTAR U-100 INSULIN) glargine 100 units/mL SubQ pen, Inject 35 Units into the skin 2 (two) times daily., Disp: 30 mL, Rfl: 2    insulin aspart U-100 (NOVOLOG FLEXPEN U-100 INSULIN) 100 unit/mL (3 mL) InPn pen, Inject 25 Units into the skin 3 (three) times daily with meals., Disp: 30 mL, Rfl: 2    ketoconazole (NIZORAL) 200 mg Tab, Take HALF A tablet (100 mg total) by mouth once daily., Disp: 15 tablet, Rfl: 9    lancets (MICROLET LANCET) Misc, 100 lancets by Misc.(Non-Drug; Combo Route) route 4 (four) times daily before meals and nightly., Disp: 100 each, Rfl: 11    magnesium oxide (MAG-OX) 400 mg (241.3 mg magnesium) tablet, Take 1 tablet (400 mg total) by mouth once daily., Disp: 90 tablet, Rfl: 1    metoprolol succinate (TOPROL-XL) 25 MG 24 hr tablet, Take 1 tablet (25 mg total) by mouth once daily., Disp: 90 tablet, Rfl: 1    montelukast (SINGULAIR) 10 mg tablet, Take 1 tablet (10 mg total) by mouth every evening., Disp: 90 tablet, Rfl: 1    multivitamin (THERAGRAN) tablet, Take 1 tablet by mouth once daily., Disp: , Rfl:     mupirocin (BACTROBAN) 2 % ointment, Apply topically 3 (three) times daily. Use as directed on the leg wound., Disp: 22 g, Rfl: 1    mycophenolate (CELLCEPT) 250 mg Cap, Take 3 capsules (750 mg total) by mouth 2 (two) times daily., Disp: 180 capsule, Rfl: 11    ondansetron (ZOFRAN) 4 MG tablet, Take 1 tablet (4 mg total) by mouth every 6 (six) hours., Disp:  "30 tablet, Rfl: 0    pen needle, diabetic (BD INSULIN PEN NEEDLE UF SHORT) 31 gauge x 5/16" Ndle, USE TO INJECT INSULIN TWICE A DAY, Disp: 200 each, Rfl: 5    potassium chloride SA (K-DUR,KLOR-CON) 20 MEQ tablet, Take 2 tablets (40 mEq total) by mouth once daily., Disp: 180 tablet, Rfl: 1    predniSONE (DELTASONE) 5 MG tablet, Take 1 tablet (5 mg total) by mouth once daily., Disp: 30 tablet, Rfl: 11    promethazine-dextromethorphan (PROMETHAZINE-DM) 6.25-15 mg/5 mL Syrp, Take 5 mLs by mouth every 6 (six) hours as needed (cough)., Disp: 180 mL, Rfl: 0    rosuvastatin (CRESTOR) 40 MG Tab, Take 1 tablet (40 mg total) by mouth once daily in the evening., Disp: 90 tablet, Rfl: 1    sacubitriL-valsartan (ENTRESTO)  mg per tablet, Take 1 tablet by mouth 2 (two) times daily., Disp: 180 tablet, Rfl: 1    tacrolimus (PROGRAF) 1 MG Cap, Take 4 capsules (4 mg total) by mouth every 12 (twelve) hours., Disp: 240 capsule, Rfl: 11    tamsulosin (FLOMAX) 0.4 mg Cap, Take 1 capsule (0.4 mg total) by mouth once daily., Disp: 30 capsule, Rfl: 11    triamcinolone acetonide 0.1% (KENALOG) 0.1 % ointment, Apply topically 2 (two) times daily. Use on bilateral lower legs., Disp: 454 g, Rfl: 1    valGANciclovir (VALCYTE) 450 mg Tab, Take 1 tablet (450 mg total) by mouth every other day., Disp: 45 tablet, Rfl: 0    metOLazone (ZAROXOLYN) 2.5 MG tablet, Take 1 tablet by mouth on Monday and Friday as directed, Disp: 30 tablet, Rfl: 11    Current Facility-Administered Medications:     acetaminophen tablet 650 mg, 650 mg, Oral, Once PRN, Sal Lopez MD        REVIEW OF SYSTEMS:  Patient has no fever, fatigue, visual changes, chest pain, edema, cough, dyspnea, nausea, vomiting, constipation, diarrhea, arthralgias, pruritis, dizziness, weakness, depression, confusion.     PHYSICAL EXAM:  Blood pressure 102/54, pulse 100, resp. rate 20, weight 271 lbs, from 287 lbs  Gen:    WDWN male in no apparent distress  Psych: Normal mood and " affect  Skin:    No rashes or ulcers  Neck:   No JVD  Chest:  Clear with no rales, rhonchi, wheezing with normal effort  CV:      Regular with no murmurs, gallops or rubs  Abd:     Soft, nontender, no distension  Ext:      1+ edema     Labs reviewed  BMP  Lab Results   Component Value Date     05/09/2023    K 4.0 05/09/2023     05/09/2023    CO2 29 05/09/2023    BUN 37 (H) 05/09/2023    CREATININE 2.8 (H) 05/09/2023    CALCIUM 9.4 05/09/2023    ANIONGAP 11 05/09/2023    EGFRNORACEVR 23.7 (A) 05/09/2023     Lab Results   Component Value Date    WBC 3.08 (L) 05/09/2023    HGB 10.1 (L) 05/09/2023    HCT 34.7 (L) 05/09/2023    MCV 89 05/09/2023     05/09/2023       Lab Results   Component Value Date    .6 (H) 11/14/2022    CALCIUM 9.4 05/09/2023    PHOS 2.7 05/09/2023          Last CMV titer by PCR was negative on 12/30/22, prior to that positive     Prograf level pending     Cardiac study: 1/18/22 reviewed:  Conclusion  Planar imaging demonstrates cardiac activity that is absent to that of the adjacent rib cage.  Study is negative for TTR amyloidosis with an uptake ratio of 1.03.  Perugini Score of 0     Echo 7/27/21 reviewed:  The left ventricle is normal in size with concentric hypertrophy and mildly decreased systolic function.  The estimated ejection fraction is 40%.  Normal right ventricular size with normal right ventricular systolic function.  Indeterminate left ventricular diastolic function.  Moderate left atrial enlargement.           IMPRESSION AND RECOMMENDATIONS:  68 y/o male with h/o of kidney transplant present for post hospital f/u after having ANGELITO and diarrhea:     1. Renal: ANGELITO resolved Cr closer to prior baseline, tends to fluctuate  Currently stable kidney function. CKD stage 4  ANGELITO was due to GI losses     Cr tends to fluctuate, but overall slowly worsening over several years  Reason for s Cr fluctuations is the use of diuretics and h/o of CHF. Pre-renal azotemia  OK to  continue diuretics (despite changes in Cr) as pt will need diuretics for fluid balance  The dose of the diuretics should be proportional to the amount of fluid gain  Again advised pt of the reason for fluid control, and avoiding salty foods.  Advised pt to limit fluid intake so that he will need less diuretics, so that s Cr will go fluctuate so much     H/o of DM and HTN  Was on HD x 2.5 years before transplant     Complications of CKD:  K normal  Na normal  Acid base stable, metabolic alkalosis due to diuretics  Ca normal  PO4 stable  Secondary hyperparathyroidism, on calcitriol   Anemia, mild     2. Immunosuppression  H/o of living related kidney transplant in 2015 form daughter  Prograf level is not a correct trough level, 6 hours post intake  Again explained to pt what a 12 hour trough level after last dose means  Will re-order for am at 8:30 am, takes prograf at 8:30 pm  No change in prograf dose for now  Meds and doses reviewed with pt     3. Diarrhea in Dec 2022. Now resolved  Positive CMV titer by PCR  Did not have colonoscopy  Was presumably treated for CMV colitis with valcyte with immediate good reesults  Discussed with transplant in NO  CMV titer became positive in April  Re-started valcyte at renal dosing in April 2023, will continue it x 3 months post last negative titer  Was referred to GI for colonoscopy to r/o CMV colitis     4. Cardiac: diastolic and systolic CHF  Stable fluid status at present  Limit salt in diet <2-3 g/day  Limit fluids < 1.5 L/day  Continue lasix 40 mg po bid (dose says 80 mg bid)  Metolazone prn  Agree with cardiology mgmt     Previously cardiac amyloid was suspected, was ruled out by above cardiac test  SPEP and UPEP unremarkable, no monoclonality     5. HTN: BP normal to low  Meds reviewed      Plans and recommendations:  As discussed above  Total time spent 40 minutes including time needed to review the records, the   patient evaluation, documentation, face-to-face  discussion with the patient,   more than 50% of the time was spent on coordination of care and counseling.    Level V visit.  RTC 2 months     Hany Gonsalez MD

## 2023-05-11 ENCOUNTER — TELEPHONE (OUTPATIENT)
Dept: TRANSPLANT | Facility: CLINIC | Age: 68
End: 2023-05-11
Payer: COMMERCIAL

## 2023-05-11 ENCOUNTER — LAB VISIT (OUTPATIENT)
Dept: LAB | Facility: HOSPITAL | Age: 68
End: 2023-05-11
Attending: INTERNAL MEDICINE
Payer: COMMERCIAL

## 2023-05-11 DIAGNOSIS — Z94.0 KIDNEY TRANSPLANTED: ICD-10-CM

## 2023-05-11 PROCEDURE — 36415 COLL VENOUS BLD VENIPUNCTURE: CPT | Performed by: INTERNAL MEDICINE

## 2023-05-11 PROCEDURE — 80197 ASSAY OF TACROLIMUS: CPT | Performed by: INTERNAL MEDICINE

## 2023-05-11 NOTE — TELEPHONE ENCOUNTER
Contacted patient to review initial intake information. Patient reports the followin. Can you walk up a flight of stairs without getting short of breath or stopping? No   2. Can you walk one block without getting short of breath or having to stop? No   3. Do you use oxygen? No   4. Do you use a cane, walker, or wheel chair to assist in mobility? Cane  5. Have you been hospitalized or had recent surgery in the last 6 months? Yes Broken Wrist in late 2023   A. Stroke   B. Heart surgery or heart catheterization   C. Broken bone  6. Do you have any cuts, open sores (ulcers), or wounds anywhere on your body? Left lower leg ulcer ope   7. Do you go to dialysis? No   8. Preferred appointment day? Anyday  9. Caregiver? Wife (Michelle Cormier)    Patient is aware the next steps will include completing records and compliance verification. Patient is aware once provider review and insurance authorization is received we will contact patient to schedule initial visit. Patient is aware that initial visit will begin prior to / at 7 am and will conclude at approximately 3 pm on date of appointment. All questions answered at this time.

## 2023-05-11 NOTE — LETTER
May 11, 2023    Hany Gonsalez  [address]  [place] [address]  Phone: [phone]  Fax: [phone]    Dear Dr. Hany Gonsalez:    Patient: Mitch Whittaker  MR Number [id number]  Date of Birth 1955    Thank you for your referral of Mitch Whittaekr to our transplant program.      Your patient's information will be screened by our Referral Nurse Coordinators to determine initial medical candidacy and if any further records are required. We will contact you if further information is needed to process the referral.     Once all needed information is received it will be forwarded to our Transplant Financial Coordinators who will obtain insurance authorization. Upon insurance approval, your patient will be contacted by our  to schedule their appointments with the transplant team. When appointments are made you will receive a copy of the appointment letter that your patient will receive.     This process may take two to six weeks to complete.     In the event your patient is not a candidate a copy of our transplant programs opinion including reasons will be returned to you to comply with your yearly review regulations. A copy of that letter will also be sent to the patient.     The following information is needed to process a referral:  Medicare form 2728 for all patients on dialysis.  Dental clearance is required if your patient has primary Medicaid.  Current immunization record.  This information can be faxed to [phone].  Current Dietician evaluation can be faxed to [phone].    Thank you again for your trust in our program and the care of your patient.  If there is anything we can do for you or your staff, please feel free to contact us at [phone].    Sincerely,   [facility]  [facility] & Kidney/Pancreas Transplant Team  [facility] [place] [address]  [phone]

## 2023-05-11 NOTE — TELEPHONE ENCOUNTER
Return call to patient and Daughter Marybeth and notified that Dr Gonsalez would like patient to follow up with his TXP team due to his ongoing CMV infection and optimize  His KIDTXC care.  Patient is schedule for labs on 5/17 and clinic on 5/24.   ----- Message from Taryn Cabral sent at 5/11/2023  2:25 PM CDT -----  Regarding: Patient advice  Name of Caller: Bushra         Contact Preference: [phone]        Nature of Call: Patient requesting a call back to discuss the reason for up coming appt on 05/24/23 would like to discuss

## 2023-05-12 LAB
CMV DNA SPEC QL NAA+PROBE: NOT DETECTED
CYTOMEGALOVIRUS LOG (IU/ML): NOT DETECTED LOGIU/ML
CYTOMEGALOVIRUS PCR, QUANT: NOT DETECTED IU/ML
TACROLIMUS BLD-MCNC: 6 NG/ML (ref 5–15)

## 2023-05-16 ENCOUNTER — OFFICE VISIT (OUTPATIENT)
Dept: OPHTHALMOLOGY | Facility: CLINIC | Age: 68
End: 2023-05-16
Payer: COMMERCIAL

## 2023-05-16 DIAGNOSIS — Z79.4 TYPE 2 DIABETES MELLITUS WITH STABLE PROLIFERATIVE RETINOPATHY OF BOTH EYES, WITH LONG-TERM CURRENT USE OF INSULIN: Primary | ICD-10-CM

## 2023-05-16 DIAGNOSIS — E11.3553 TYPE 2 DIABETES MELLITUS WITH STABLE PROLIFERATIVE RETINOPATHY OF BOTH EYES, WITH LONG-TERM CURRENT USE OF INSULIN: Primary | ICD-10-CM

## 2023-05-16 DIAGNOSIS — Z96.1 PSEUDOPHAKIA: ICD-10-CM

## 2023-05-16 PROCEDURE — 99999 PR PBB SHADOW E&M-EST. PATIENT-LVL III: CPT | Mod: PBBFAC,,, | Performed by: OPHTHALMOLOGY

## 2023-05-16 PROCEDURE — 99213 OFFICE O/P EST LOW 20 MIN: CPT | Mod: PBBFAC | Performed by: OPHTHALMOLOGY

## 2023-05-16 PROCEDURE — 92014 PR EYE EXAM, EST PATIENT,COMPREHESV: ICD-10-PCS | Mod: S$GLB,,, | Performed by: OPHTHALMOLOGY

## 2023-05-16 PROCEDURE — 92014 COMPRE OPH EXAM EST PT 1/>: CPT | Mod: S$GLB,,, | Performed by: OPHTHALMOLOGY

## 2023-05-16 PROCEDURE — 99999 PR PBB SHADOW E&M-EST. PATIENT-LVL III: ICD-10-PCS | Mod: PBBFAC,,, | Performed by: OPHTHALMOLOGY

## 2023-05-16 PROCEDURE — 92134 CPTRZ OPH DX IMG PST SGM RTA: CPT | Mod: PBBFAC | Performed by: OPHTHALMOLOGY

## 2023-05-16 PROCEDURE — 92134 POSTERIOR SEGMENT OCT RETINA (OCULAR COHERENCE TOMOGRAPHY)-BOTH EYES: ICD-10-PCS | Mod: S$GLB,,, | Performed by: OPHTHALMOLOGY

## 2023-05-16 NOTE — PROGRESS NOTES
===============================  Date today is 5/16/2023  Mitch Whittaker is a 67 y.o. male  Last visit [facility]: :4/25/2022   Last visit eye dept. Visit date not found    Uncorrected distance visual acuity was 20/20 in the right eye and 20/20 in the left eye.  Tonometry       Tonometry (Applanation, 11:12 AM)         Right Left    Pressure 22 20                  Not recorded       Not recorded       Not recorded       Chief Complaint   Patient presents with    Diabetic Eye Exam     Pt states here for dm eye exam / no visual complaints       Problem List Items Addressed This Visit          Eye/Vision problems    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, with long-term current use of insulin - Primary    Overview     DM since 1996  H/o PRP and Focal OU  OS TRD  H/O Kidney transplant 6/12/15  A1C 8.0 12/19/19           Relevant Orders    Posterior Segment OCT Retina-Both eyes (Completed)    Pseudophakia    Overview     2005 Dr. Merlos            Instructed to call 24/7 for any worsening of vision, visual distortion or pain.  Check OU independently daily.    Gave my office and personal cell phone number.  ________________  5/16/2023 today  Mitch Whittaker    DM stable retinopathy  OCT stable  Od scapoid  1dd inferior temporal  S/p prp   Os s/p PRP ok  Asymptomatic      RTC 6 months with optos  Instructed to call 24/7 for any worsening of vision or symptoms. Check OU daily.   Gave my office and cell phone number.       =============================

## 2023-05-17 ENCOUNTER — OFFICE VISIT (OUTPATIENT)
Dept: URGENT CARE | Facility: CLINIC | Age: 68
End: 2023-05-17
Payer: MEDICARE

## 2023-05-17 ENCOUNTER — LAB VISIT (OUTPATIENT)
Dept: LAB | Facility: HOSPITAL | Age: 68
End: 2023-05-17
Payer: COMMERCIAL

## 2023-05-17 VITALS
RESPIRATION RATE: 18 BRPM | BODY MASS INDEX: 51.35 KG/M2 | OXYGEN SATURATION: 96 % | WEIGHT: 272 LBS | HEART RATE: 104 BPM | HEIGHT: 61 IN | SYSTOLIC BLOOD PRESSURE: 107 MMHG | TEMPERATURE: 98 F | DIASTOLIC BLOOD PRESSURE: 58 MMHG

## 2023-05-17 DIAGNOSIS — Z94.0 KIDNEY REPLACED BY TRANSPLANT: ICD-10-CM

## 2023-05-17 DIAGNOSIS — S60.569A INSECT BITE OF HAND WITH LOCAL REACTION, INITIAL ENCOUNTER: Primary | ICD-10-CM
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
## 2023-05-17 DIAGNOSIS — M79.89 SWELLING OF RIGHT HAND: ICD-10-CM

## 2023-05-17 LAB
ALBUMIN SERPL BCP-MCNC: 3.5 G/DL (ref 3.5–5.2)
ALBUMIN SERPL BCP-MCNC: 3.5 G/DL (ref 3.5–5.2)
ALP SERPL-CCNC: 49 U/L (ref 55–135)
ALT SERPL W/O P-5'-P-CCNC: 10 U/L (ref 10–44)
ANION GAP SERPL CALC-SCNC: 12 MMOL/L (ref 8–16)
ANISOCYTOSIS BLD QL SMEAR: SLIGHT
AST SERPL-CCNC: 17 U/L (ref 10–40)
BASOPHILS NFR BLD: 0 % (ref 0–1.9)
BILIRUB DIRECT SERPL-MCNC: 0.2 MG/DL (ref 0.1–0.3)
BILIRUB SERPL-MCNC: 0.4 MG/DL (ref 0.1–1)
BUN SERPL-MCNC: 43 MG/DL (ref 8–23)
CALCIUM SERPL-MCNC: 9.5 MG/DL (ref 8.7–10.5)
CHLORIDE SERPL-SCNC: 108 MMOL/L (ref 95–110)
CO2 SERPL-SCNC: 25 MMOL/L (ref 23–29)
CREAT SERPL-MCNC: 2.5 MG/DL (ref 0.5–1.4)
DIFFERENTIAL METHOD: ABNORMAL
EOSINOPHIL NFR BLD: 1 % (ref 0–8)
ERYTHROCYTE [DISTWIDTH] IN BLOOD BY AUTOMATED COUNT: 13.4 % (ref 11.5–14.5)
EST. GFR  (NO RACE VARIABLE): 27.1 ML/MIN/1.73 M^2
GLUCOSE SERPL-MCNC: 133 MG/DL (ref 70–110)
HCT VFR BLD AUTO: 34.4 % (ref 40–54)
HGB BLD-MCNC: 10.1 G/DL (ref 14–18)
HYPOCHROMIA BLD QL SMEAR: ABNORMAL
IMM GRANULOCYTES # BLD AUTO: ABNORMAL K/UL (ref 0–0.04)
IMM GRANULOCYTES NFR BLD AUTO: ABNORMAL % (ref 0–0.5)
LYMPHOCYTES NFR BLD: 30 % (ref 18–48)
MCH RBC QN AUTO: 25.7 PG (ref 27–31)
MCHC RBC AUTO-ENTMCNC: 29.4 G/DL (ref 32–36)
MCV RBC AUTO: 88 FL (ref 82–98)
MONOCYTES NFR BLD: 10 % (ref 4–15)
NEUTROPHILS NFR BLD: 59 % (ref 38–73)
NRBC BLD-RTO: 0 /100 WBC
OVALOCYTES BLD QL SMEAR: ABNORMAL
PHOSPHATE SERPL-MCNC: 3.2 MG/DL (ref 2.7–4.5)
PLATELET # BLD AUTO: 195 K/UL (ref 150–450)
PLATELET BLD QL SMEAR: ABNORMAL
PMV BLD AUTO: 11.9 FL (ref 9.2–12.9)
POIKILOCYTOSIS BLD QL SMEAR: SLIGHT
POLYCHROMASIA BLD QL SMEAR: ABNORMAL
POTASSIUM SERPL-SCNC: 4 MMOL/L (ref 3.5–5.1)
PROT SERPL-MCNC: 6.5 G/DL (ref 6–8.4)
PTH-INTACT SERPL-MCNC: 163.5 PG/ML (ref 9–77)
RBC # BLD AUTO: 3.93 M/UL (ref 4.6–6.2)
SCHISTOCYTES BLD QL SMEAR: ABNORMAL
SODIUM SERPL-SCNC: 145 MMOL/L (ref 136–145)
WBC # BLD AUTO: 3.59 K/UL (ref 3.9–12.7)

## 2023-05-17 PROCEDURE — 84075 ASSAY ALKALINE PHOSPHATASE: CPT | Performed by: INTERNAL MEDICINE

## 2023-05-17 PROCEDURE — 80069 RENAL FUNCTION PANEL: CPT | Performed by: INTERNAL MEDICINE

## 2023-05-17 PROCEDURE — 83970 ASSAY OF PARATHORMONE: CPT | Performed by: INTERNAL MEDICINE

## 2023-05-17 PROCEDURE — 36415 COLL VENOUS BLD VENIPUNCTURE: CPT | Performed by: INTERNAL MEDICINE

## 2023-05-17 PROCEDURE — 99213 OFFICE O/P EST LOW 20 MIN: CPT | Mod: S$GLB,,, | Performed by: NURSE PRACTITIONER

## 2023-05-17 PROCEDURE — 80197 ASSAY OF TACROLIMUS: CPT | Performed by: INTERNAL MEDICINE

## 2023-05-17 PROCEDURE — 99213 PR OFFICE/OUTPT VISIT, EST, LEVL III, 20-29 MIN: ICD-10-PCS | Mod: S$GLB,,, | Performed by: NURSE PRACTITIONER

## 2023-05-17 PROCEDURE — 85007 BL SMEAR W/DIFF WBC COUNT: CPT | Performed by: INTERNAL MEDICINE

## 2023-05-17 PROCEDURE — 85027 COMPLETE CBC AUTOMATED: CPT | Performed by: INTERNAL MEDICINE

## 2023-05-17 RX ORDER — PREDNISONE 20 MG/1
20 TABLET ORAL 2 TIMES DAILY
Qty: 6 TABLET | Refills: 0 | Status: SHIPPED | OUTPATIENT
Start: 2023-05-17 | End: 2023-05-20

## 2023-05-17 RX ORDER — CEPHALEXIN 500 MG/1
500 CAPSULE ORAL DAILY
Qty: 5 CAPSULE | Refills: 0 | Status: SHIPPED | OUTPATIENT
Start: 2023-05-17 | End: 2023-05-24

## 2023-05-17 NOTE — PATIENT INSTRUCTIONS
Avoid scratching the area.  Apply ice for 10-15 minutes at a time to help with irritation.  Take oral steroids as prescribed.  Take Cephalexin as prescribed.  Monitor for signs of infection such as increase in swelling, warmth and drainage.  Follow up with PCP or return to clinic for any signs of infection.

## 2023-05-17 NOTE — PROGRESS NOTES
"Subjective:      Patient ID: Mitch Whittaker is a 67 y.o. male.    Vitals:  height is 5' 1" (1.549 m) and weight is 123.4 kg (272 lb). His temperature is 98.1 °F (36.7 °C). His blood pressure is 107/58 (abnormal) and his pulse is 104. His respiration is 18 and oxygen saturation is 96%.     Chief Complaint: Hand Pain    Patient present with Right hand swelling and warm to touch. This started Sunday (3 days ago) and has progressively gotten worse. Thinks it is from an insect bite because he was out washing the car. Denies numbness and tingling. Denies fever.     Hand Pain   The incident occurred 3 to 5 days ago. The incident occurred at home. The injury mechanism is unknown. The pain is present in the right hand. The quality of the pain is described as aching. The pain does not radiate. The pain is at a severity of 6/10. The pain is mild. The pain has been Constant since the incident. Pertinent negatives include no chest pain, muscle weakness, numbness or tingling. The symptoms are aggravated by movement. He has tried ice for the symptoms. The treatment provided no relief.     Cardiovascular:  Negative for chest pain.   Skin:  Negative for erythema.   Neurological:  Negative for numbness.    Objective:     Physical Exam   Constitutional: He is oriented to person, place, and time. He appears well-developed.   HENT:   Head: Normocephalic and atraumatic. Head is without abrasion, without contusion and without laceration.   Ears:   Right Ear: External ear normal.   Left Ear: External ear normal.   Nose: Nose normal.   Mouth/Throat: Oropharynx is clear and moist and mucous membranes are normal.   Eyes: Conjunctivae, EOM and lids are normal. Pupils are equal, round, and reactive to light.   Neck: Trachea normal and phonation normal. Neck supple.   Cardiovascular: Normal rate, regular rhythm and normal heart sounds.   Pulmonary/Chest: Effort normal and breath sounds normal. No stridor. No respiratory distress. "   Musculoskeletal: Normal range of motion.         General: Normal range of motion.   Neurological: He is alert and oriented to person, place, and time.   Skin: Skin is warm, dry, intact and no rash. Capillary refill takes less than 2 seconds. No abrasion, No burn, No bruising, No erythema and No ecchymosis        Psychiatric: His speech is normal and behavior is normal. Judgment and thought content normal.   Nursing note and vitals reviewed.    Assessment:     1. Insect bite of hand with local reaction, initial encounter    2. Swelling of right hand        Plan:       Insect bite of hand with local reaction, initial encounter  -     predniSONE (DELTASONE) 20 MG tablet; Take 1 tablet (20 mg total) by mouth 2 (two) times daily. for 3 days  Dispense: 6 tablet; Refill: 0  -     cephALEXin (KEFLEX) 500 MG capsule; Take 1 capsule (500 mg total) by mouth once daily. for 5 days  Dispense: 5 capsule; Refill: 0    Swelling of right hand  -     predniSONE (DELTASONE) 20 MG tablet; Take 1 tablet (20 mg total) by mouth 2 (two) times daily. for 3 days  Dispense: 6 tablet; Refill: 0                Avoid scratching the area.  Apply ice for 10-15 minutes at a time to help with irritation.  Take oral steroids as prescribed.  Take Cephalexin as prescribed.  Monitor for signs of infection such as increase in swelling, warmth and drainage.  Follow up with PCP or return to clinic for any signs of infection.

## 2023-05-18 ENCOUNTER — TELEPHONE (OUTPATIENT)
Dept: TRANSPLANT | Facility: CLINIC | Age: 68
End: 2023-05-18
Payer: COMMERCIAL

## 2023-05-18 ENCOUNTER — HOSPITAL ENCOUNTER (OUTPATIENT)
Dept: PREADMISSION TESTING | Facility: HOSPITAL | Age: 68
Discharge: HOME | End: 2023-05-18
Attending: PHYSICIAN ASSISTANT
Payer: MEDICARE

## 2023-05-18 ENCOUNTER — OFFICE VISIT (OUTPATIENT)
Dept: GASTROENTEROLOGY | Facility: CLINIC | Age: 68
End: 2023-05-18
Payer: COMMERCIAL

## 2023-05-18 VITALS
DIASTOLIC BLOOD PRESSURE: 50 MMHG | WEIGHT: 269.19 LBS | HEIGHT: 61 IN | OXYGEN SATURATION: 98 % | SYSTOLIC BLOOD PRESSURE: 100 MMHG | HEART RATE: 92 BPM | BODY MASS INDEX: 50.82 KG/M2

## 2023-05-18 DIAGNOSIS — A09 DIARRHEA OF INFECTIOUS ORIGIN: ICD-10-CM

## 2023-05-18 DIAGNOSIS — Z94.0 HISTORY OF KIDNEY TRANSPLANT: ICD-10-CM

## 2023-05-18 DIAGNOSIS — Z86.19 HISTORY OF CMV: ICD-10-CM

## 2023-05-18 DIAGNOSIS — Z86.19 HISTORY OF CMV: Primary | ICD-10-CM

## 2023-05-18 LAB — TACROLIMUS BLD-MCNC: 6.5 NG/ML (ref 5–15)

## 2023-05-18 PROCEDURE — 99203 OFFICE O/P NEW LOW 30 MIN: CPT | Mod: S$GLB,,, | Performed by: PHYSICIAN ASSISTANT

## 2023-05-18 PROCEDURE — 99999 PR PBB SHADOW E&M-EST. PATIENT-LVL V: CPT | Mod: PBBFAC,,, | Performed by: PHYSICIAN ASSISTANT

## 2023-05-18 PROCEDURE — 99203 PR OFFICE/OUTPT VISIT, NEW, LEVL III, 30-44 MIN: ICD-10-PCS | Mod: S$GLB,,, | Performed by: PHYSICIAN ASSISTANT

## 2023-05-18 PROCEDURE — 99215 OFFICE O/P EST HI 40 MIN: CPT | Mod: PBBFAC | Performed by: PHYSICIAN ASSISTANT

## 2023-05-18 PROCEDURE — 99999 PR PBB SHADOW E&M-EST. PATIENT-LVL V: ICD-10-PCS | Mod: PBBFAC,,, | Performed by: PHYSICIAN ASSISTANT

## 2023-05-18 RX ORDER — POLYETHYLENE GLYCOL 3350, SODIUM SULFATE ANHYDROUS, SODIUM BICARBONATE, SODIUM CHLORIDE, POTASSIUM CHLORIDE 236; 22.74; 6.74; 5.86; 2.97 G/4L; G/4L; G/4L; G/4L; G/4L
4 POWDER, FOR SOLUTION ORAL ONCE
Qty: 4000 ML | Refills: 0 | Status: SHIPPED | OUTPATIENT
Start: 2023-05-18 | End: 2023-05-20

## 2023-05-18 NOTE — PROGRESS NOTES
Clinic Consult:  [facility] Consultation Note    Reason for Consult:  The primary encounter diagnosis was History of CMV. Diagnoses of Diarrhea of infectious origin and History of kidney transplant were also pertinent to this visit.    PCP: Alix Kowalski   No address on file    CC: Colonoscopy      HPI:  This is a 67 y.o. male here for evaluation of the above. S/p kidney transplant 2015. On chronic immunosuppression. Was hospitalized at the end of last year with diarrhea of unknown origin. Was found to be CMV positive - was treated with antivirals and symptoms resolved. No colonoscopy completed at that time. He was found to have positive repeat CMV PCR in April. Had one episode of diarrhea today but just started on antibiotic (Keflex) for a bite on his hand. No additional complaints of diarrhea.   He has been referred to have colonoscopy to rule out CMV colitis.   Last scope 2022 for screening purposes. No biopsies obtained.   Denies any additional symptoms.     Review of Systems   Constitutional:  Negative for activity change, appetite change, chills, diaphoresis, fatigue, fever and unexpected weight change.   Respiratory:  Negative for cough and shortness of breath.    Cardiovascular:  Negative for chest pain.   Gastrointestinal:  Positive for diarrhea (x1 episode today). Negative for abdominal distention, abdominal pain, blood in stool, constipation, nausea and vomiting.   Neurological:  Negative for dizziness and weakness.   Psychiatric/Behavioral:  Negative for dysphoric mood. The patient is nervous/anxious.       Medical History:   Past Medical History:   Diagnosis Date    Acquired renal cyst of left kidney     Anemia associated with chronic renal failure     CAD (coronary artery disease)     nonobstructive [facility] 9/14    CHF (congestive heart failure)     Chronic immunosuppression with Prograf and MMF 06/18/2015    Chronic venous insufficiency of lower extremity     CKD (chronic kidney disease)  stage 3, GFR 30-59 ml/min     Diabetic retinopathy     DM (diabetes mellitus), type 2 with complications 1994    Edema     End stage kidney disease     s/p transplant, doing well    Gallbladder polyp     Heart failure, diastolic, due to HTN     Hemodialysis status     off since transplant    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 RNA NEGATIVE 2015, ,     History of colon polyps     HPTH (hyperparathyroidism)     Hyperlipidemia     Hypertension associated with stage 3 chronic kidney disease due to type 2 diabetes mellitus     LBBB (left bundle branch block) 2021    Morbid obesity with BMI of 45.0-49.9, adult     PCO (posterior capsular opacification), left 2019    Proteinuria     resolved s/p transplant    S/P kidney transplant     Sleep apnea     Type 2 diabetes, uncontrolled, with retinopathy     Type II diabetes mellitus with renal manifestations        Surgical History:   Past Surgical History:   Procedure Laterality Date    CARDIAC CATHETERIZATION      normal coronary    COLONOSCOPY N/A 2018    Procedure: COLONOSCOPY;  Surgeon: Chava Ronquillo MD;  Location: [facility];  Service: Endoscopy;  Laterality: N/A;    COLONOSCOPY N/A 2022    Procedure: COLONOSCOPY;  Surgeon: Alix Puente MD;  Location: [facility];  Service: Endoscopy;  Laterality: N/A;    KIDNEY TRANSPLANT      RETINAL LASER PROCEDURE         Family History:    Family History   Problem Relation Age of Onset    Diabetes Mother     Hypertension Mother     Heart failure Mother     Kidney disease Sister         ESRD    Diabetes Sister     Diabetes Maternal Grandmother     Cancer Neg Hx        Social History:   Social History     Socioeconomic History    Marital status:     Number of children: 2   Occupational History    Occupation: retired     Employer: Retired   Tobacco Use    Smoking status: Former     Types: Cigarettes     Quit date: 2013     Years since quittin.9     " Passive exposure: Past    Smokeless tobacco: Former     Quit date: 6/11/2013    Tobacco comments:     used marijuana since [id number], stopped after started dialysis   Substance and Sexual Activity    Alcohol use: No     Alcohol/week: 0.0 standard drinks    Drug use: No     Comment:      Sexual activity: Never   Social History Narrative    . Lives with spouse. Has 2 children. Patient retired as  for [employer]. He has been washing cars.       Allergies:   Review of patient's allergies indicates:   Allergen Reactions    Lisinopril Other (See Comments)     Other reaction(s):  cough    Actos  [pioglitazone] Other (See Comments)     Other reaction(s): CHF    Metformin Other (See Comments)     Other reaction(s): renal insuff  Other reaction(s): CHF       Home Medications:   Current Outpatient Medications on File Prior to Visit   Medication Sig Dispense Refill    amitriptyline (ELAVIL) 25 MG tablet Take 1 tablet (25 mg total) by mouth nightly as needed for Insomnia. 30 tablet 2    apixaban (ELIQUIS) 5 mg Tab Take 1 tablet (5 mg total) by mouth 2 (two) times daily. 180 tablet 1    aspirin (ECOTRIN) 81 MG EC tablet Take 1 tablet by mouth Daily.       BD ULTRA-FINE MINI PEN NEEDLE 31 gauge x 3/16" Ndle Use to inject insulin as needed up to 4 times daily 100 each 3    calcitRIOL (ROCALTROL) 0.25 MCG Cap Take 1 capsule (0.25 mcg total) by mouth once daily. 30 capsule 11    cephALEXin (KEFLEX) 500 MG capsule Take 1 capsule (500 mg total) by mouth once daily. for 5 days 5 capsule 0    famotidine (PEPCID) 20 MG tablet TAKE ONE TABLET BY MOUTH EVERY EVENING 30 tablet 11    fish oil-omega-3 fatty acids 300-1,000 mg capsule Take 1 g by mouth once daily.      furosemide (LASIX) 80 MG tablet Take one-half tablet (40 mg) by mouth 2 (two) times daily. 30 tablet 11    glimepiride (AMARYL) 2 MG tablet Take 1 tablet (2 mg total) by mouth before breakfast. 90 tablet 3    insulin (LANTUS SOLOSTAR U-100 INSULIN) " glargine 100 units/mL SubQ pen Inject 35 Units into the skin 2 (two) times daily. 30 mL 2    insulin aspart U-100 (NOVOLOG FLEXPEN U-100 INSULIN) 100 unit/mL (3 mL) InPn pen Inject 25 Units into the skin 3 (three) times daily with meals. 30 mL 2    magnesium oxide (MAG-OX) 400 mg (241.3 mg magnesium) tablet Take 1 tablet (400 mg total) by mouth once daily. 90 tablet 1    metoprolol succinate (TOPROL-XL) 25 MG 24 hr tablet Take 1 tablet (25 mg total) by mouth once daily. 90 tablet 1    montelukast (SINGULAIR) 10 mg tablet Take 1 tablet (10 mg total) by mouth every evening. 90 tablet 1    multivitamin (THERAGRAN) tablet Take 1 tablet by mouth once daily.      mycophenolate (CELLCEPT) 250 mg Cap Take 3 capsules (750 mg total) by mouth 2 (two) times daily. 180 capsule 11    ondansetron (ZOFRAN) 4 MG tablet Take 1 tablet (4 mg total) by mouth every 6 (six) hours. 30 tablet 0    potassium chloride SA (K-DUR,KLOR-CON) 20 MEQ tablet Take 2 tablets (40 mEq total) by mouth once daily. 180 tablet 1    predniSONE (DELTASONE) 20 MG tablet Take 1 tablet (20 mg total) by mouth 2 (two) times daily. for 3 days 6 tablet 0    predniSONE (DELTASONE) 5 MG tablet Take 1 tablet (5 mg total) by mouth once daily. 30 tablet 11    promethazine-dextromethorphan (PROMETHAZINE-DM) 6.25-15 mg/5 mL Syrp Take 5 mLs by mouth every 6 (six) hours as needed (cough). 180 mL 0    rosuvastatin (CRESTOR) 40 MG Tab Take 1 tablet (40 mg total) by mouth once daily in the evening. 90 tablet 1    sacubitriL-valsartan (ENTRESTO)  mg per tablet Take 1 tablet by mouth 2 (two) times daily. 180 tablet 1    tacrolimus (PROGRAF) 1 MG Cap Take 4 capsules (4 mg total) by mouth every 12 (twelve) hours. 240 capsule 11    tamsulosin (FLOMAX) 0.4 mg Cap Take 1 capsule (0.4 mg total) by mouth once daily. 30 capsule 11    valGANciclovir (VALCYTE) 450 mg Tab Take 1 tablet (450 mg total) by mouth every other day. 45 tablet 0    blood sugar diagnostic Strp Use to test four  "times per day 150 strip 11    blood sugar diagnostic Strp 100 each by Misc.(Non-Drug; Combo Route) route 4 (four) times daily before meals and nightly. 100 each 1    blood-glucose meter (FREESTYLE LITE METER) kit 1 each 4 (four) times daily. 1 each 0    diclofenac sodium (VOLTAREN) 1 % Gel Apply 2 g topically 3 (three) times daily. 450 g 2    ketoconazole (NIZORAL) 200 mg Tab Take HALF A tablet (100 mg total) by mouth once daily. 15 tablet 9    lancets (MICROLET LANCET) Misc 100 lancets by Misc.(Non-Drug; Combo Route) route 4 (four) times daily before meals and nightly. 100 each 11    metOLazone (ZAROXOLYN) 2.5 MG tablet Take 1 tablet by mouth on Monday and Friday as directed 30 tablet 11    mupirocin (BACTROBAN) 2 % ointment Apply topically 3 (three) times daily. Use as directed on the leg wound. 22 g 1    pen needle, diabetic (BD INSULIN PEN NEEDLE UF SHORT) 31 gauge x 5/16" Ndle USE TO INJECT INSULIN TWICE A  each 5    triamcinolone acetonide 0.1% (KENALOG) 0.1 % ointment Apply topically 2 (two) times daily. Use on bilateral lower legs. 454 g 1     Current Facility-Administered Medications on File Prior to Visit   Medication Dose Route Frequency Provider Last Rate Last Admin    acetaminophen tablet 650 mg  650 mg Oral Once PRN Sal Lopez MD           Physical Exam:  BP (!) 100/50   Pulse 92   Ht 5' 1" (1.549 m)   Wt 122.1 kg (269 lb 2.9 oz)   SpO2 98%   BMI 50.86 kg/m²   Body mass index is 50.86 kg/m².  Physical Exam  Constitutional:       General: He is not in acute distress.     Appearance: Normal appearance. He is obese. He is not ill-appearing, toxic-appearing or diaphoretic.   HENT:      Head: Normocephalic and atraumatic.   Eyes:      General: No scleral icterus.     Extraocular Movements: Extraocular movements intact.   Cardiovascular:      Rate and Rhythm: Normal rate and regular rhythm.   Pulmonary:      Effort: Pulmonary effort is normal. No respiratory distress.   Abdominal:      " General: Bowel sounds are normal. There is no distension.      Palpations: Abdomen is soft.      Tenderness: There is no abdominal tenderness. There is no guarding.   Musculoskeletal:         General: Normal range of motion.      Cervical back: Normal range of motion.   Skin:     General: Skin is warm and dry.   Neurological:      Mental Status: He is alert and oriented to person, place, and time.         Labs: Pertinent labs reviewed.  Endoscopy: --  [facility] Screenin22  Anticoagulation: Eliquis    Assessment:  1. History of CMV    2. Diarrhea of infectious origin    3. History of kidney transplant         Recommendations:  -Plan for colonoscopy with biopsies to rule out CMV colitis.   -Patient seeing transplant in [place] next week to discuss treatment options. Would like to plan for colonoscopy after next week.  -Last colonoscopy last year for screening purposes - 2 polyps and hemorrhoids.      History of CMV  -     Cancel: Case Request Endoscopy: COLONOSCOPY  -     Ambulatory referral/consult to Endo Procedure ; Future; Expected date: 2023  -     polyethylene glycol (GOLYTELY) 236-22.74-6.74 -5.86 gram suspension; Take 4,000 mLs (4 L total) by mouth once. for 1 dose  Dispense: 4000 mL; Refill: 0  -     Case Request Endoscopy: COLONOSCOPY - rule out CMV    Diarrhea of infectious origin  -     Ambulatory referral/consult to Gastroenterology  -     Cancel: Case Request Endoscopy: COLONOSCOPY  -     Ambulatory referral/consult to Endo Procedure ; Future; Expected date: 2023  -     polyethylene glycol (GOLYTELY) 236-22.74-6.74 -5.86 gram suspension; Take 4,000 mLs (4 L total) by mouth once. for 1 dose  Dispense: 4000 mL; Refill: 0  -     Case Request Endoscopy: COLONOSCOPY - rule out CMV    History of kidney transplant  -     Cancel: Case Request Endoscopy: COLONOSCOPY  -     Ambulatory referral/consult to Endo Procedure ; Future; Expected date: 2023  -      polyethylene glycol (GOLYTELY) 236-22.74-6.74 -5.86 gram suspension; Take 4,000 mLs (4 L total) by mouth once. for 1 dose  Dispense: 4000 mL; Refill: 0  -     Case Request Endoscopy: COLONOSCOPY - rule out CMV        No follow-ups on file.    Thank you so much for allowing me to participate in the care of Mitch Garvin PA-C

## 2023-05-18 NOTE — TELEPHONE ENCOUNTER
Results reviewed with patient and voice a understanding that he needs to be drinking 2 liters of H2O daily.  ----- Message from Marci Pan MD sent at 5/18/2023  2:09 PM CDT -----  Na 145: needs good hydration  Leukopenia: pending CMV PCR

## 2023-05-20 ENCOUNTER — TELEPHONE (OUTPATIENT)
Dept: URGENT CARE | Facility: CLINIC | Age: 68
End: 2023-05-20
Payer: COMMERCIAL

## 2023-05-21 ENCOUNTER — E-CONSULT (OUTPATIENT)
Dept: CARDIOLOGY | Facility: CLINIC | Age: 68
End: 2023-05-21
Payer: COMMERCIAL

## 2023-05-21 DIAGNOSIS — Z01.810 PREOP CARDIOVASCULAR EXAM: ICD-10-CM

## 2023-05-21 PROCEDURE — 99451 NTRPROF PH1/NTRNET/EHR 5/>: CPT | Mod: S$GLB,,, | Performed by: INTERNAL MEDICINE

## 2023-05-21 PROCEDURE — 99451 PR INTERPROF, PHONE/INTERNET/EHR, CONSULT, >= 5MINS: ICD-10-PCS | Mod: S$GLB,,, | Performed by: INTERNAL MEDICINE

## 2023-05-21 NOTE — CONSULTS
[facility]  Response for E-Consult     Patient Name: Mitch Whittaker  MRN: [id number]  Primary Care Provider: Alix Kowalski DO   Requesting Provider: Bozena Laughlin NP  E-Consult to Cardiology  Consult performed by: Paddy Meade MD  Consult ordered by: Bozena Laughlin NP        Findings: 66 yo male, E consult for preop clearance of colonoscopy  The chart reviewed.  PMH non obs CAD, h/o COVID 19, DVT on Eliquis, Obesity,  HFrEF 40-45%, CKD, DM2, MARION, HTN, HLD, ESRD s/p renal transplant    ekg NSR LBBB chronic    Plan  Elevated periop risk of CV events for non-high risk procedure.  Ok to proceed the scheduled procedure without further cardiac study.  OK to hold eliquis 3 days and asa 7 days before the procedure and resume postop once hemodynamically stable      I did not speak to the requesting provider verbally about this.     Total time of Consultation: 10 minute    Percentage of time spent on written/verbal discussion: 100%     *This eConsult is based on the clinical data available to me and is furnished without benefit of a physical examination. The eConsult will need to be interpreted in light of any clinical issues or changes in patient status not available to me at the time of filing this eConsults. Significant changes in patient condition or level of acuity should result in immediate formal consultation and reevaluation. Please alert me if you have further questions.    Thank you for your consult.     Paddy Meade MD  [facility]

## 2023-05-24 ENCOUNTER — OFFICE VISIT (OUTPATIENT)
Dept: TRANSPLANT | Facility: CLINIC | Age: 68
End: 2023-05-24
Payer: COMMERCIAL

## 2023-05-24 VITALS
TEMPERATURE: 97 F | DIASTOLIC BLOOD PRESSURE: 58 MMHG | BODY MASS INDEX: 43.25 KG/M2 | HEART RATE: 104 BPM | RESPIRATION RATE: 16 BRPM | WEIGHT: 275.56 LBS | HEIGHT: 67 IN | OXYGEN SATURATION: 96 % | SYSTOLIC BLOOD PRESSURE: 129 MMHG

## 2023-05-24 DIAGNOSIS — Z94.0 KIDNEY TRANSPLANTED: ICD-10-CM

## 2023-05-24 DIAGNOSIS — N25.81 SECONDARY HYPERPARATHYROIDISM OF RENAL ORIGIN: Primary | ICD-10-CM

## 2023-05-24 DIAGNOSIS — Z94.0 KIDNEY TRANSPLANT STATUS, LIVING RELATED DONOR: Chronic | ICD-10-CM

## 2023-05-24 DIAGNOSIS — E11.21 TYPE 2 DIABETES MELLITUS WITH DIABETIC NEPHROPATHY, UNSPECIFIED WHETHER LONG TERM INSULIN USE: ICD-10-CM

## 2023-05-24 PROBLEM — B25.1: Status: RESOLVED | Noted: 2022-12-10 | Resolved: 2023-05-24

## 2023-05-24 PROBLEM — R19.7 DIARRHEA OF PRESUMED INFECTIOUS ORIGIN: Status: RESOLVED | Noted: 2022-12-08 | Resolved: 2023-05-24

## 2023-05-24 PROCEDURE — 99215 OFFICE O/P EST HI 40 MIN: CPT | Mod: S$GLB,,, | Performed by: INTERNAL MEDICINE

## 2023-05-24 PROCEDURE — 99999 PR PBB SHADOW E&M-EST. PATIENT-LVL V: CPT | Mod: PBBFAC,,, | Performed by: INTERNAL MEDICINE

## 2023-05-24 PROCEDURE — 99215 PR OFFICE/OUTPT VISIT, EST, LEVL V, 40-54 MIN: ICD-10-PCS | Mod: S$GLB,,, | Performed by: INTERNAL MEDICINE

## 2023-05-24 PROCEDURE — 99999 PR PBB SHADOW E&M-EST. PATIENT-LVL V: ICD-10-PCS | Mod: PBBFAC,,, | Performed by: INTERNAL MEDICINE

## 2023-05-24 PROCEDURE — 99215 OFFICE O/P EST HI 40 MIN: CPT | Mod: PBBFAC | Performed by: INTERNAL MEDICINE

## 2023-05-24 NOTE — PROGRESS NOTES
Kidney Post-Transplant Assessment    Referring Physician: Bucky Danielle  Current Nephrologist: Bucky Danielle    ORGAN: LEFT KIDNEY  Donor Type: living  PHS Increased Risk: no  Cold Ischemia: 44 mins  Induction Medications: thymoglobulin    Subjective:     CC:  Reassessment of renal allograft function and management of chronic immunosuppression.    HPI:  Mr. Whittaker is a 67 y.o. year old Black or  male who received a living kidney transplant on 6/12/15.  He has baseline creatinine is between 2.2-3.5.  He takes mycophenolate mofetil, prednisone and tacrolimus for maintenance immunosuppression.  His last cr 2.5.     Pertinent History  1. Chronic combined systolic and diastolic heart failure//HFrEF; stage C, FC 3; EF 40% (2/2022)  2. CAD   3. LBBB  4. S/p living donor renal transplant (6/12/2015) - CKD 3-4  5.  H/o DVT  6. DM 2  7. Chronic venous insufficiency   8.  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 He is still on valcyte. Last CMV PCR from May 2025 is negative. Pt has no complaints of diarrhea, dysphagia. he denies any chest pain, shortness of breath, ENT symptoms, nausea/vomiting, dysuria, frequency, urgency, or pain over the allograft. Hi kidney function has declined slowly over the time. His last Cr 2.5 ( improved in comparison to previous values).    Although there is no strong reason to keep the pt on valcyte at this point, I instruct pt to continue valcyte till he gets his colonoscopy done in early June.. If CMV staining comes negative, valcyte should be stopped.    Pt follows with his PCP for his DM. Had couple low BS readings in last few weeks- Following with his is PCP. He has also seen Dermatologist. Following with his nephrologist for CKD and his Cardiologist for Heart failure      Current Outpatient Medications:     amitriptyline (ELAVIL) 25 MG tablet, Take 1 tablet (25 mg total) by mouth nightly as needed for Insomnia., Disp:  "30 tablet, Rfl: 2    apixaban (ELIQUIS) 5 mg Tab, Take 1 tablet (5 mg total) by mouth 2 (two) times daily., Disp: 180 tablet, Rfl: 1    aspirin (ECOTRIN) 81 MG EC tablet, Take 1 tablet by mouth Daily. , Disp: , Rfl:     BD ULTRA-FINE MINI PEN NEEDLE 31 gauge x 3/16" Ndle, Use to inject insulin as needed up to 4 times daily, Disp: 100 each, Rfl: 3    blood sugar diagnostic Strp, Use to test four times per day, Disp: 150 strip, Rfl: 11    blood sugar diagnostic Strp, 100 each by Misc.(Non-Drug; Combo Route) route 4 (four) times daily before meals and nightly., Disp: 100 each, Rfl: 1    blood-glucose meter (FREESTYLE LITE METER) kit, 1 each 4 (four) times daily., Disp: 1 each, Rfl: 0    calcitRIOL (ROCALTROL) 0.25 MCG Cap, Take 1 capsule (0.25 mcg total) by mouth once daily., Disp: 30 capsule, Rfl: 11    diclofenac sodium (VOLTAREN) 1 % Gel, Apply 2 g topically 3 (three) times daily., Disp: 450 g, Rfl: 2    famotidine (PEPCID) 20 MG tablet, TAKE ONE TABLET BY MOUTH EVERY EVENING, Disp: 30 tablet, Rfl: 11    fish oil-omega-3 fatty acids 300-1,000 mg capsule, Take 1 g by mouth once daily., Disp: , Rfl:     glimepiride (AMARYL) 2 MG tablet, Take 1 tablet (2 mg total) by mouth before breakfast., Disp: 90 tablet, Rfl: 3    insulin (LANTUS SOLOSTAR U-100 INSULIN) glargine 100 units/mL SubQ pen, Inject 35 Units into the skin 2 (two) times daily., Disp: 30 mL, Rfl: 2    insulin aspart U-100 (NOVOLOG FLEXPEN U-100 INSULIN) 100 unit/mL (3 mL) InPn pen, Inject 25 Units into the skin 3 (three) times daily with meals., Disp: 30 mL, Rfl: 2    ketoconazole (NIZORAL) 200 mg Tab, Take HALF A tablet (100 mg total) by mouth once daily., Disp: 15 tablet, Rfl: 9    lancets (MICROLET LANCET) Misc, 100 lancets by Misc.(Non-Drug; Combo Route) route 4 (four) times daily before meals and nightly., Disp: 100 each, Rfl: 11    magnesium oxide (MAG-OX) 400 mg (241.3 mg magnesium) tablet, Take 1 tablet (400 mg total) by mouth once daily., Disp: 90 " "tablet, Rfl: 1    metoprolol succinate (TOPROL-XL) 25 MG 24 hr tablet, Take 1 tablet (25 mg total) by mouth once daily., Disp: 90 tablet, Rfl: 1    montelukast (SINGULAIR) 10 mg tablet, Take 1 tablet (10 mg total) by mouth every evening., Disp: 90 tablet, Rfl: 1    multivitamin (THERAGRAN) tablet, Take 1 tablet by mouth once daily., Disp: , Rfl:     mycophenolate (CELLCEPT) 250 mg Cap, Take 3 capsules (750 mg total) by mouth 2 (two) times daily., Disp: 180 capsule, Rfl: 11    ondansetron (ZOFRAN) 4 MG tablet, Take 1 tablet (4 mg total) by mouth every 6 (six) hours., Disp: 30 tablet, Rfl: 0    pen needle, diabetic (BD INSULIN PEN NEEDLE UF SHORT) 31 gauge x 5/16" Ndle, USE TO INJECT INSULIN TWICE A DAY, Disp: 200 each, Rfl: 5    potassium chloride SA (K-DUR,KLOR-CON) 20 MEQ tablet, Take 2 tablets (40 mEq total) by mouth once daily., Disp: 180 tablet, Rfl: 1    predniSONE (DELTASONE) 5 MG tablet, Take 1 tablet (5 mg total) by mouth once daily., Disp: 30 tablet, Rfl: 11    promethazine-dextromethorphan (PROMETHAZINE-DM) 6.25-15 mg/5 mL Syrp, Take 5 mLs by mouth every 6 (six) hours as needed (cough)., Disp: 180 mL, Rfl: 0    rosuvastatin (CRESTOR) 40 MG Tab, Take 1 tablet (40 mg total) by mouth once daily in the evening., Disp: 90 tablet, Rfl: 1    sacubitriL-valsartan (ENTRESTO)  mg per tablet, Take 1 tablet by mouth 2 (two) times daily., Disp: 180 tablet, Rfl: 1    tacrolimus (PROGRAF) 1 MG Cap, Take 4 capsules (4 mg total) by mouth every 12 (twelve) hours., Disp: 240 capsule, Rfl: 11    tamsulosin (FLOMAX) 0.4 mg Cap, Take 1 capsule (0.4 mg total) by mouth once daily., Disp: 30 capsule, Rfl: 11    triamcinolone acetonide 0.1% (KENALOG) 0.1 % ointment, Apply topically 2 (two) times daily. Use on bilateral lower legs., Disp: 454 g, Rfl: 1    valGANciclovir (VALCYTE) 450 mg Tab, Take 1 tablet (450 mg total) by mouth every other day., Disp: 45 tablet, Rfl: 0    Current Facility-Administered Medications:     " acetaminophen tablet 650 mg, 650 mg, Oral, Once PRN, Sal Lopez MD      Past Medical History:   Diagnosis Date    Acquired renal cyst of left kidney     Anemia associated with chronic renal failure     CAD (coronary artery disease)     nonobstructive lhc 9/14    CHF (congestive heart failure)     Chronic immunosuppression with Prograf and MMF 06/18/2015    Chronic venous insufficiency of lower extremity     CKD (chronic kidney disease) stage 3, GFR 30-59 ml/min     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 RNA NEGATIVE 5/2015, 2021, 2022    History of colon polyps     HPTH (hyperparathyroidism)     Hyperlipidemia     Hypertension associated with stage 3 chronic kidney disease due to type 2 diabetes mellitus     LBBB (left bundle branch block) 12/20/2021    Morbid obesity with BMI of 45.0-49.9, adult     Nephrolithiasis 6/7/2013    PCO (posterior capsular opacification), left 03/04/2019    Proteinuria     resolved s/p transplant    S/P kidney transplant     Sleep apnea     Type 2 diabetes, uncontrolled, with retinopathy     Type II diabetes mellitus with renal manifestations        Review of Systems   Constitutional:  Negative for activity change, appetite change, chills, fatigue, fever and unexpected weight change.   HENT:  Negative for congestion, facial swelling, postnasal drip, rhinorrhea, sinus pressure, sore throat and trouble swallowing.         Seasonal allergies   Eyes:  Negative for pain, redness and visual disturbance.   Respiratory:  Negative for cough, chest tightness, shortness of breath and wheezing.    Cardiovascular:  Positive for leg swelling. Negative  "for chest pain and palpitations.   Gastrointestinal:  Negative for abdominal pain, diarrhea, nausea and vomiting.   Genitourinary:  Negative for dysuria, flank pain and urgency.   Musculoskeletal:  Negative for gait problem, neck pain and neck stiffness.   Skin:  Negative for rash.   Allergic/Immunologic: Positive for immunocompromised state. Negative for environmental allergies and food allergies.   Neurological:  Negative for dizziness, weakness, light-headedness and headaches.   Psychiatric/Behavioral:  Negative for agitation and confusion. The patient is not nervous/anxious.      Objective:   Blood pressure (!) 129/58, pulse 104, temperature 97.2 °F (36.2 °C), temperature source Temporal, resp. rate 16, height 5' 7" (1.702 m), weight 125 kg (275 lb 9.2 oz), SpO2 96 %.body mass index is 43.16 kg/m².    Physical Exam  Vitals reviewed.   Constitutional:       Appearance: Normal appearance. He is well-developed.   HENT:      Head: Normocephalic.      Mouth/Throat:      Pharynx: No oropharyngeal exudate.   Eyes:      General: No scleral icterus.     Conjunctiva/sclera: Conjunctivae normal.      Pupils: Pupils are equal, round, and reactive to light.   Cardiovascular:      Rate and Rhythm: Normal rate and regular rhythm.      Heart sounds: Normal heart sounds.   Pulmonary:      Effort: Pulmonary effort is normal.      Breath sounds: Normal breath sounds.   Abdominal:      General: Bowel sounds are normal. There is no distension.      Palpations: Abdomen is soft. There is no hepatomegaly, splenomegaly or mass.      Tenderness: There is no abdominal tenderness. There is no guarding or rebound. Negative signs include Durand's sign and McBurney's sign.       Musculoskeletal:         General: Normal range of motion.      Cervical back: Normal range of motion and neck supple.      Right lower leg: Edema present.      Left lower leg: Edema present.      Comments: 1+ edema   Lymphadenopathy:      Cervical: No cervical " adenopathy.   Skin:     General: Skin is warm and dry.   Neurological:      Mental Status: He is alert and oriented to person, place, and time.      Motor: No abnormal muscle tone.      Coordination: Coordination normal.   Psychiatric:         Behavior: Behavior normal.       Labs:  Lab Results   Component Value Date    WBC 3.59 (L) 05/17/2023    HGB 10.1 (L) 05/17/2023    HCT 34.4 (L) 05/17/2023     05/17/2023    K 4.0 05/17/2023     05/17/2023    CO2 25 05/17/2023    BUN 43 (H) 05/17/2023    CREATININE 2.5 (H) 05/17/2023    EGFRNONAA 19.6 (A) 07/06/2022    CALCIUM 9.5 05/17/2023    PHOS 3.2 05/17/2023    MG 2.1 03/03/2022    ALBUMIN 3.5 05/17/2023    ALBUMIN 3.5 05/17/2023    AST 17 05/17/2023    ALT 10 05/17/2023       No results found for: EXTANC, EXTWBC, EXTSEGS, EXTPLATELETS, EXTHEMOGLOBI, EXTHEMATOCRI, EXTCREATININ, EXTSODIUM, EXTPOTASSIUM, EXTBUN, EXTCO2, EXTCALCIUM, EXTPHOSPHORU, EXTGLUCOSE, EXTALBUMIN, EXTAST, EXTALT, EXTBILITOTAL, EXTLIPASE, EXTAMYLASE    No results found for: EXTCYCLOSLVL, EXTSIROLIMUS, EXTTACROLVL, EXTPROTCRE, EXTPTHINTACT, EXTPROTEINUA, EXTWBCUA, EXTRBCUA    Labs were reviewed with the patient.    Assessment:     1. Secondary hyperparathyroidism of renal origin    2. Type 2 diabetes mellitus with diabetic nephropathy, unspecified whether long term insulin use    3. Kidney transplanted    4. Living-related donor kidney transplant (daughter) - 6/12/15          Plan:       MMF to 500 mg BID   Low salt diet   Prograf level target ~ 4-5  Water intake 1 liter/day   Continue Valcyte till his colonoscopy in early June  Clinic visit in 6 months  Continue to follow with PCP (DM and health maintenance and health screening) and General Nephrologist (CKD care)  Continue Dermatologist for yearly skin check due to IS therapy and bilsters  Continue to follow with Cardiology for heart failure  Encourage lifestyle modifications: diet, exercise, weight loss             Allograft function  assessment:  - CKD3-4 - never had kidney biopsy.   - last cr 2.5  - no significant proteinuria    Immunosuppression post Transplant  -Target level for Mitch is 4-5  - on MMF, prograf and predniosne. Monitor tac for toxicity  -Monitor for side effects and toxicities, given narrow therapeutic window and significant risk of AE       Leukopenia:   - mild leukopenia due to valcyte. Pt continue valcyte till colonoscopy in June. If CMV staining comes negative, we will stop valcyte.  - last CMV PCR negative   - MMF to 500 mg BID       Hypertension-- on multiple meds for his heart failure. Has couple low BP readings   BP Readings from Last 3 Encounters:   05/24/23 (!) 129/58   05/18/23 (!) 100/50   05/17/23 (!) 107/58   on hydralazine, MTP, Entresto along with ASA and Apixaban --deferred management to Cardiology             Education:   Material provided to the patient.  Patient reminded to call with any health changes since these can be early signs of significant complications.  Also, I advised the patient to be sure any new medications or changes of old medications are discussed with either a pharmacist or physician knowledgeable with transplant to avoid rejection/drug toxicity related to significant drug interactions.

## 2023-05-24 NOTE — LETTER
May 25, 2023        Hany Gonsalez  [address] [place] [address]  Phone: [phone]  Fax: [phone]             [facility]  [address]  [place] [address]  Phone: [phone]   Patient: Mitch Whittaker   MR Number: [id number]   YOB: 1955   Date of Visit: 5/24/2023       Dear Dr. Hany Gonsalez    Thank you for referring Mitch Whittaker to me for evaluation. Attached you will find relevant portions of my assessment and plan of care.    If you have questions, please do not hesitate to call me. I look forward to following Mitch Whittaker along with you.    Sincerely,    Marci Pan MD    Enclosure    If you would like to receive this communication electronically, please contact [email or web address] or [phone] to request [employer] Link access.    [employer] is a tool which provides read-only access to select patient information with whom you have a relationship. Its easy to use and provides real time access to review your patients record including encounter summaries, notes, results, and demographic information.    If you feel you have received this communication in error or would no longer like to receive these types of communications, please e-mail [email or web address]

## 2023-05-31 RX ORDER — CALCITRIOL 0.25 UG/1
0.25 CAPSULE ORAL DAILY
Qty: 30 CAPSULE | Refills: 11 | Status: SHIPPED | OUTPATIENT
Start: 2023-05-31 | End: 2024-05-30

## 2023-06-07 ENCOUNTER — HOSPITAL ENCOUNTER (OUTPATIENT)
Facility: HOSPITAL | Age: 68
Discharge: HOME | End: 2023-06-07
Attending: INTERNAL MEDICINE | Admitting: INTERNAL MEDICINE
Payer: COMMERCIAL

## 2023-06-07 ENCOUNTER — ANESTHESIA EVENT (OUTPATIENT)
Dept: ENDOSCOPY | Facility: HOSPITAL | Age: 68
End: 2023-06-07
Payer: COMMERCIAL

## 2023-06-07 ENCOUNTER — ANESTHESIA (OUTPATIENT)
Dept: ENDOSCOPY | Facility: HOSPITAL | Age: 68
End: 2023-06-07
Payer: COMMERCIAL

## 2023-06-07 DIAGNOSIS — K52.9 CHRONIC DIARRHEA: Primary | ICD-10-CM

## 2023-06-07 DIAGNOSIS — Z94.0 KIDNEY TRANSPLANTED: ICD-10-CM

## 2023-06-07 LAB
GLUCOSE SERPL-MCNC: 89 MG/DL (ref 70–110)
POCT GLUCOSE: 89 MG/DL (ref 70–110)

## 2023-06-07 PROCEDURE — 27201012 HC FORCEPS, HOT/COLD, DISP: Performed by: INTERNAL MEDICINE

## 2023-06-07 PROCEDURE — 45380 PR COLONOSCOPY,BIOPSY: ICD-10-PCS | Mod: 59,,, | Performed by: INTERNAL MEDICINE

## 2023-06-07 PROCEDURE — 88305 TISSUE EXAM BY PATHOLOGIST: ICD-10-PCS | Mod: 26,,, | Performed by: PATHOLOGY

## 2023-06-07 PROCEDURE — 37000009 HC ANESTHESIA EA ADD 15 MINS: Performed by: INTERNAL MEDICINE

## 2023-06-07 PROCEDURE — 63600175 PHARM REV CODE 636 W HCPCS: Performed by: NURSE ANESTHETIST, CERTIFIED REGISTERED

## 2023-06-07 PROCEDURE — 45380 COLONOSCOPY AND BIOPSY: CPT | Mod: 59 | Performed by: INTERNAL MEDICINE

## 2023-06-07 PROCEDURE — 25000003 PHARM REV CODE 250: Performed by: NURSE ANESTHETIST, CERTIFIED REGISTERED

## 2023-06-07 PROCEDURE — 37000008 HC ANESTHESIA 1ST 15 MINUTES: Performed by: INTERNAL MEDICINE

## 2023-06-07 PROCEDURE — 88305 TISSUE EXAM BY PATHOLOGIST: CPT | Mod: 26,,, | Performed by: PATHOLOGY

## 2023-06-07 PROCEDURE — 45385 PR COLONOSCOPY,REMV LESN,SNARE: ICD-10-PCS | Mod: ,,, | Performed by: INTERNAL MEDICINE

## 2023-06-07 PROCEDURE — 27201089 HC SNARE, DISP (ANY): Performed by: INTERNAL MEDICINE

## 2023-06-07 PROCEDURE — 45380 COLONOSCOPY AND BIOPSY: CPT | Mod: 59,,, | Performed by: INTERNAL MEDICINE

## 2023-06-07 PROCEDURE — 88305 TISSUE EXAM BY PATHOLOGIST: CPT | Mod: 59 | Performed by: PATHOLOGY

## 2023-06-07 PROCEDURE — 45385 COLONOSCOPY W/LESION REMOVAL: CPT | Mod: ,,, | Performed by: INTERNAL MEDICINE

## 2023-06-07 RX ORDER — LIDOCAINE HYDROCHLORIDE 10 MG/ML
INJECTION, SOLUTION EPIDURAL; INFILTRATION; INTRACAUDAL; PERINEURAL
Status: DISCONTINUED | OUTPATIENT
Start: 2023-06-07 | End: 2023-06-07

## 2023-06-07 RX ORDER — PROPOFOL 10 MG/ML
VIAL (ML) INTRAVENOUS
Status: DISCONTINUED | OUTPATIENT
Start: 2023-06-07 | End: 2023-06-07

## 2023-06-07 RX ADMIN — PROPOFOL 30 MG: 10 INJECTION, EMULSION INTRAVENOUS at 09:06

## 2023-06-07 RX ADMIN — LIDOCAINE HYDROCHLORIDE 50 MG: 10 SOLUTION INTRAVENOUS at 09:06

## 2023-06-07 RX ADMIN — PROPOFOL 80 MG: 10 INJECTION, EMULSION INTRAVENOUS at 09:06

## 2023-06-07 RX ADMIN — SODIUM CHLORIDE, SODIUM LACTATE, POTASSIUM CHLORIDE, AND CALCIUM CHLORIDE: .6; .31; .03; .02 INJECTION, SOLUTION INTRAVENOUS at 08:06

## 2023-06-07 NOTE — ANESTHESIA POSTPROCEDURE EVALUATION
Anesthesia Post Evaluation    Patient: Mitch Whittaker    Procedure(s) Performed: Procedure(s) (LRB):  COLONOSCOPY - rule out CMV  Cardiac clearance/Eliquis hold approval received on 05/21/23 per Dr. Meade, cardiology.  Note in encounters.  LB (N/A)    Final Anesthesia Type: MAC      Patient location during evaluation: PACU  Patient participation: Yes- Able to Participate  Level of consciousness: awake and alert  Post-procedure vital signs: reviewed and stable  Pain management: adequate  Airway patency: patent    PONV status at discharge: No PONV  Anesthetic complications: no      Cardiovascular status: blood pressure returned to baseline  Respiratory status: unassisted and room air  Hydration status: euvolemic  Follow-up not needed.          Vitals Value Taken Time   /58 06/07/23 0814   Temp 36.9 °C (98.4 °F) 06/07/23 0814   Pulse 113 06/07/23 0814   Resp 18 06/07/23 0814   SpO2 99 % 06/07/23 0814         No case tracking events are documented in the log.      Pain/Lakeisha Score: No data recorded       Topical Clindamycin Counseling: Patient counseled that this medication may cause skin irritation or allergic reactions.  In the event of skin irritation, the patient was advised to reduce the amount of the drug applied or use it less frequently.   The patient verbalized understanding of the proper use and possible adverse effects of clindamycin.  All of the patient's questions and concerns were addressed.

## 2023-06-07 NOTE — TRANSFER OF CARE
"Anesthesia Transfer of Care Note    Patient: Mitch Whittaker    Procedure(s) Performed: Procedure(s) (LRB):  COLONOSCOPY - rule out CMV  Cardiac clearance/Eliquis hold approval received on 05/21/23 per Dr. Meade, cardiology.  Note in encounters.  LB (N/A)    Patient location: PACU    Anesthesia Type: MAC    Transport from OR: Transported from OR on room air with adequate spontaneous ventilation    Post pain: adequate analgesia    Post assessment: no apparent anesthetic complications    Post vital signs: stable    Level of consciousness: responds to stimulation    Nausea/Vomiting: no nausea/vomiting    Complications: none    Transfer of care protocol was followed      Last vitals:   Visit Vitals  BP (!) 132/58 (BP Location: Left arm, Patient Position: Lying)   Pulse (!) 113   Temp 36.9 °C (98.4 °F) (Temporal)   Resp 18   Ht 5' 7" (1.702 m)   Wt 125.6 kg (277 lb)   SpO2 99%   BMI 43.38 kg/m²     "

## 2023-06-07 NOTE — ANESTHESIA PREPROCEDURE EVALUATION
06/07/2023  Mitch Whittaker is a 67 y.o., male.      Pre-op Assessment    I have reviewed the Patient Summary Reports.     I have reviewed the Nursing Notes. I have reviewed the NPO Status.   I have reviewed the Medications.     Review of Systems  Anesthesia Hx:  No problems with previous Anesthesia  Denies Family Hx of Anesthesia complications.   Denies Personal Hx of Anesthesia complications.   Social:  Former Smoker    Hematology/Oncology:     Oncology Normal    -- Anemia:   EENT/Dental:EENT/Dental Normal   Cardiovascular:   Exercise tolerance: poor Hypertension CAD  Dysrhythmias  CHF ECG has been reviewed. EF 40%   Pulmonary:   Sleep Apnea    Renal/:   Chronic Renal Disease, CKD    Hepatic/GI:   Liver Disease, Hepatitis    Neurological:   Neuromuscular Disease,    Endocrine:   Diabetes, type 2  Morbid Obesity / BMI > 40  Dermatological:  Skin Normal    Psych:  Psychiatric Normal           Physical Exam  General: Cooperative, Alert and Oriented    Airway:  Mallampati: III   Mouth Opening: Normal  TM Distance: Normal  Tongue: Normal  Neck ROM: Normal ROM    Chest/Lungs:  Clear to auscultation, Normal Respiratory Rate    Heart:  Rate: Normal  Rhythm: Regular Rhythm        Anesthesia Plan  Type of Anesthesia, risks & benefits discussed:    Anesthesia Type: MAC  Intra-op Monitoring Plan: Standard ASA Monitors  Induction:  IV  Informed Consent: Informed consent signed with the Patient and all parties understand the risks and agree with anesthesia plan.  All questions answered.   ASA Score: 3  Day of Surgery Review of History & Physical: H&P Update referred to the surgeon/provider.I have interviewed and examined the patient. I have reviewed the patient's H&P dated: There are no significant changes.     Ready For Surgery From Anesthesia Perspective.     .

## 2023-06-07 NOTE — [employer] PATIENT INSTRUCTIONS
Discharge Summary/Instructions after an Endoscopic Procedure  Patient Name: Mitch Whittaker  Patient MRN: [id number]  Patient YOB: 1955 Wednesday, June 7, 2023 Daniella Shah MD  Dear patient,  As a result of recent federal legislation (The Federal Cures Act), you may   receive lab or pathology results from your procedure in your [facility]   account before your physician is able to contact you. Your physician or   their representative will relay the results to you with their   recommendations at their soonest availability.  Thank you,  RESTRICTIONS:  During your procedure today, you received medications for sedation.  These   medications may affect your judgment, balance and coordination.  Therefore,   for 24 hours, you have the following restrictions:   - DO NOT drive a car, operate machinery, make legal/financial decisions,   sign important papers or drink alcohol.    ACTIVITY:  Today: no heavy lifting, straining or running due to procedural   sedation/anesthesia.  The following day: return to full activity including work.  DIET:  Eat and drink normally unless instructed otherwise.     TREATMENT FOR COMMON SIDE EFFECTS:  - Mild abdominal pain, nausea, belching, bloating or excessive gas:  rest,   eat lightly and use a heating pad.  - Sore Throat: treat with throat lozenges and/or gargle with warm salt   water.  - Because air was used during the procedure, expelling large amounts of air   from your rectum or belching is normal.  - If a bowel prep was taken, you may not have a bowel movement for 1-3 days.    This is normal.  SYMPTOMS TO WATCH FOR AND REPORT TO YOUR PHYSICIAN:  1. Abdominal pain or bloating, other than gas cramps.  2. Chest pain.  3. Back pain.  4. Signs of infection such as: chills or fever occurring within 24 hours   after the procedure.  5. Rectal bleeding, which would show as bright red, maroon, or black stools.   (A tablespoon of blood from the rectum is not serious, especially  if   hemorrhoids are present.)  6. Vomiting.  7. Weakness or dizziness.  GO DIRECTLY TO THE NEAREST EMERGENCY ROOM IF YOU HAVE ANY OF THE FOLLOWING:      Difficulty breathing              Chills and/or fever over 101 F   Persistent vomiting and/or vomiting blood   Severe abdominal pain   Severe chest pain   Black, tarry stools   Bleeding- more than one tablespoon   Any other symptom or condition that you feel may need urgent attention  Your doctor recommends these additional instructions:  If any biopsies were taken, your doctors clinic will contact you in 1 to 2   weeks with any results.  - Discharge patient to home (via wheelchair).   - Resume previous diet.   - Continue present medications.   - Await pathology results.   - Repeat colonoscopy in 5 years for surveillance.   - Telephone GI clinic for pathology results in 2 weeks.   - Patient has a contact number available for emergencies.  The signs and   symptoms of potential delayed complications were discussed with the   patient.  Return to normal activities tomorrow.  Written discharge   instructions were provided to the patient.  For questions, problems or results please call your physician Daniella Shah MD at Work:  [phone]  If you have any questions about the above instructions, call the GI   department at [phone] or call the endoscopy unit at [phone]   from 7am until 3 pm.  [facility] [place], EMERGENCY ROOM PHONE NUMBER:   [phone]  IF A COMPLICATION OR EMERGENCY SITUATION ARISES AND YOU ARE UNABLE TO REACH   YOUR PHYSICIAN - GO DIRECTLY TO THE EMERGENCY ROOM.  I have read or have had read to me these discharge instructions for my   procedure and have received a written copy.  I understand these   instructions and will follow-up with my physician if I have any questions.     __________________________________       _____________________________________  Nurse Signature                                           Patient/Designated   Responsible Party Signature  MD Daniella Ambrocio MD  6/7/2023 9:17:39 AM  [employer]

## 2023-06-07 NOTE — H&P
Short Stay Endoscopy History and Physical    PCP - Alix Kowalski, DO    Procedure - Colonoscopy  ASA - 2  Mallampati - per anesthesia  History of Anesthesia problems - no  Family history Anesthesia problems -  no     HPI:  This is a 67 y.o. male here for evaluation of :   Active Hospital Problems    Diagnosis  POA    *Chronic diarrhea [K52.9]  Yes    Kidney transplanted [Z94.0]  Not Applicable      Resolved Hospital Problems   No resolved problems to display.         Health Maintenance         Date Due Completion Date    COVID-19 Vaccine (5 - Pfizer series) 10/12/2022 8/17/2022    Diabetes Urine Screening 03/11/2023 3/11/2022    Hemoglobin A1c 09/28/2023 3/28/2023    Foot Exam 10/10/2023 10/10/2022 (Done)    Override on 10/10/2022: Done    Override on 10/15/2021: Done    Override on 9/26/2019: Done    Override on 2/16/2015: Done    Override on 12/8/2014: Done    Override on 10/26/2014: Done    Override on 9/11/2013: Done    Lipid Panel 03/28/2024 3/28/2023    Eye Exam 05/16/2024 5/16/2023    Override on 5/16/2023: Done    Colorectal Cancer Screening 05/02/2027 5/2/2022    TETANUS VACCINE 03/18/2032 3/18/2022              ROS:  CONSTITUTIONAL: Denies weight change,  fatigue, fevers, chills, night sweats.  CARDIOVASCULAR: Denies chest pain, shortness of breath, orthopnea and edema.  RESPIRATORY: Denies cough, hemoptysis, dyspnea, and wheezing.  GI: See HPI.    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 RNA NEGATIVE 5/2015, 2021, 2022    History of colon polyps     HPTH (hyperparathyroidism)     Hyperlipidemia     Hypertension associated with stage 3 chronic kidney disease due to type 2 diabetes mellitus     LBBB (left bundle branch block) 12/20/2021    Morbid obesity with BMI of 45.0-49.9, adult     Nephrolithiasis 6/7/2013    PCO (posterior capsular opacification), left 03/04/2019    Proteinuria     resolved s/p transplant    S/P kidney transplant     Sleep apnea     Type 2 diabetes, uncontrolled, with retinopathy     Type II diabetes mellitus with renal manifestations        Surgical History:   Past Surgical History:   Procedure Laterality Date    CARDIAC CATHETERIZATION  2008    normal coronary    COLONOSCOPY N/A 4/5/2018    Procedure: COLONOSCOPY;  Surgeon: Chava Ronquillo MD;  Location: [facility] ENDO;  Service: Endoscopy;  Laterality: N/A;    COLONOSCOPY N/A 5/2/2022    Procedure: COLONOSCOPY;  Surgeon: Alix Puente MD;  Location: [facility] ENDO;  Service: Endoscopy;  Laterality: N/A;    KIDNEY TRANSPLANT      RETINAL LASER PROCEDURE         Family History:   Family History   Problem Relation Age of Onset    Diabetes Mother     Hypertension Mother     Heart failure Mother     Kidney disease Sister         ESRD    Diabetes Sister     Diabetes Maternal Grandmother     Cancer Neg Hx        Social History:   Social History     Tobacco Use    Smoking status: Former     Types: Cigarettes     Quit date: 5/25/2013     Years since quitting: 10.0     Passive exposure: Past    Smokeless tobacco: Former     Quit date: 6/11/2013    Tobacco comments:     used marijuana since [id number], stopped after started dialysis   Substance Use Topics    Alcohol use: No     Alcohol/week: 0.0 standard  "drinks    Drug use: No     Comment:         Allergies:   Review of patient's allergies indicates:   Allergen Reactions    Lisinopril Other (See Comments)     Other reaction(s):  cough    Actos  [pioglitazone] Other (See Comments)     Other reaction(s): CHF    Metformin Other (See Comments)     Other reaction(s): renal insuff  Other reaction(s): CHF       Medications:   No current facility-administered medications on file prior to encounter.     Current Outpatient Medications on File Prior to Encounter   Medication Sig Dispense Refill    amitriptyline (ELAVIL) 25 MG tablet Take 1 tablet (25 mg total) by mouth nightly as needed for Insomnia. 30 tablet 2    apixaban (ELIQUIS) 5 mg Tab Take 1 tablet (5 mg total) by mouth 2 (two) times daily. 180 tablet 1    aspirin (ECOTRIN) 81 MG EC tablet Take 1 tablet by mouth Daily.       BD ULTRA-FINE MINI PEN NEEDLE 31 gauge x 3/16" Ndle Use to inject insulin as needed up to 4 times daily 100 each 3    blood sugar diagnostic Strp Use to test four times per day 150 strip 11    blood sugar diagnostic Strp 100 each by Misc.(Non-Drug; Combo Route) route 4 (four) times daily before meals and nightly. 100 each 1    blood-glucose meter (FREESTYLE LITE METER) kit 1 each 4 (four) times daily. 1 each 0    diclofenac sodium (VOLTAREN) 1 % Gel Apply 2 g topically 3 (three) times daily. 450 g 2    famotidine (PEPCID) 20 MG tablet TAKE ONE TABLET BY MOUTH EVERY EVENING 30 tablet 11    fish oil-omega-3 fatty acids 300-1,000 mg capsule Take 1 g by mouth once daily.      glimepiride (AMARYL) 2 MG tablet Take 1 tablet (2 mg total) by mouth before breakfast. 90 tablet 3    insulin (LANTUS SOLOSTAR U-100 INSULIN) glargine 100 units/mL SubQ pen Inject 35 Units into the skin 2 (two) times daily. 30 mL 2    insulin aspart U-100 (NOVOLOG FLEXPEN U-100 INSULIN) 100 unit/mL (3 mL) InPn pen Inject 25 Units into the skin 3 (three) times daily with meals. 30 mL 2    ketoconazole (NIZORAL) 200 mg Tab Take HALF " "A tablet (100 mg total) by mouth once daily. 15 tablet 9    lancets (MICROLET LANCET) Misc 100 lancets by Misc.(Non-Drug; Combo Route) route 4 (four) times daily before meals and nightly. 100 each 11    magnesium oxide (MAG-OX) 400 mg (241.3 mg magnesium) tablet Take 1 tablet (400 mg total) by mouth once daily. 90 tablet 1    metoprolol succinate (TOPROL-XL) 25 MG 24 hr tablet Take 1 tablet (25 mg total) by mouth once daily. 90 tablet 1    montelukast (SINGULAIR) 10 mg tablet Take 1 tablet (10 mg total) by mouth every evening. 90 tablet 1    multivitamin (THERAGRAN) tablet Take 1 tablet by mouth once daily.      mycophenolate (CELLCEPT) 250 mg Cap Take 3 capsules (750 mg total) by mouth 2 (two) times daily. 180 capsule 11    ondansetron (ZOFRAN) 4 MG tablet Take 1 tablet (4 mg total) by mouth every 6 (six) hours. 30 tablet 0    pen needle, diabetic (BD INSULIN PEN NEEDLE UF SHORT) 31 gauge x 5/16" Ndle USE TO INJECT INSULIN TWICE A  each 5    potassium chloride SA (K-DUR,KLOR-CON) 20 MEQ tablet Take 2 tablets (40 mEq total) by mouth once daily. 180 tablet 1    predniSONE (DELTASONE) 5 MG tablet Take 1 tablet (5 mg total) by mouth once daily. 30 tablet 11    promethazine-dextromethorphan (PROMETHAZINE-DM) 6.25-15 mg/5 mL Syrp Take 5 mLs by mouth every 6 (six) hours as needed (cough). 180 mL 0    rosuvastatin (CRESTOR) 40 MG Tab Take 1 tablet (40 mg total) by mouth once daily in the evening. 90 tablet 1    sacubitriL-valsartan (ENTRESTO)  mg per tablet Take 1 tablet by mouth 2 (two) times daily. 180 tablet 1    tacrolimus (PROGRAF) 1 MG Cap Take 4 capsules (4 mg total) by mouth every 12 (twelve) hours. 240 capsule 11    tamsulosin (FLOMAX) 0.4 mg Cap Take 1 capsule (0.4 mg total) by mouth once daily. 30 capsule 11    triamcinolone acetonide 0.1% (KENALOG) 0.1 % ointment Apply topically 2 (two) times daily. Use on bilateral lower legs. 454 g 1    valGANciclovir (VALCYTE) 450 mg Tab Take 1 tablet (450 mg " total) by mouth every other day. 45 tablet 0       Physical Exam:  Vital Signs: There were no vitals filed for this visit.  General Appearance: Well appearing in no acute distress  ENT: OP clear  Chest: CTA B  CV: RRR, no m/r/g  Abd: s/nt/nd/nabs  Ext: no edema    Labs:Reviewed    IMP:  Active Hospital Problems    Diagnosis  POA    *Chronic diarrhea [K52.9]  Yes    Kidney transplanted [Z94.0]  Not Applicable      Resolved Hospital Problems   No resolved problems to display.         Plan:   I have explained the risks and benefits of colonoscopy to the patient including but not limited to bleeding, perforation, infection, and death. The patient wishes to proceed.

## 2023-06-07 NOTE — DISCHARGE SUMMARY
[facility] - Endoscopy (Hospital)  Discharge Note  Short Stay    Procedure(s) (LRB):  COLONOSCOPY - rule out CMV  Cardiac clearance/Eliquis hold approval received on 05/21/23 per Dr. Meade, cardiology.  Note in encounters.  LB (N/A)      OUTCOME: Patient tolerated treatment/procedure well without complication and is now ready for discharge.    DISPOSITION: Home or Self Care    FINAL DIAGNOSIS:  Chronic diarrhea    FOLLOWUP: With primary care provider    DISCHARGE INSTRUCTIONS:  No discharge procedures on file.

## 2023-06-07 NOTE — LETTER
Mitch Whittaker  [address] [place] [address]      [facility] ENDOSCOPY PATIENT INSTRUCTIONS  You are scheduled for a/an Colonoscopy (Procedure), on Wednesday (Day), 06/07/2023 (Date).      Your pending arrival time is 08:30AM, which is subject to change. You will be contacted prior to the day of the procedure with your arrival time.                  Your procedure will be performed at [facility] ([facility]). The hospital is located at [address], [address], [address] ([place]). Once on [address], [facility] is the second building (Entrance #2) on the left. Check in for your procedure on the 1st floor. ([phone])    ALL PATIENTS:   ? Please plan to be at the hospital for 3 - 4 hours.   ? Use of Anesthesia requires you to have a responsible person to drive you to the hospital, stay while the procedure is being performed, assume responsibility for your care at discharge, and drive you home. You should not operate a vehicle, machinery or sign any legal documents until the next day. YOU MUST HAVE A RESPONSIBLE PERSON TO DRIVE YOU HOME.  ? Leave all valuables at home, including jewelry. (Piercings must be removed) You will need to bring your 's license, medical insurance card, and a method of payment. You will be responsible for any co-payment at time of registration - Please reach out to Financial Services if you have any questions or concerns.   ? If biopsies need to be performed or a polyp needs to be removed, this may result in a change in the billing of your procedure and could impact your payment responsibility depending on your insurance provider.   ? Wear clothing appropriate for easy re-dressing after sedation.   ? Please bring a complete list of all medications you are taking.     MEDICATIONS:  ? BLOOD THINNING MEDICATION (Coumadin, Plavix, Eliquis, etc.):  o If you are on a blood thinning medication, our scheduling team will obtain  clearance to hold from your prescribing physician. Please do not stop these medications until it is approved by your provider. Our scheduling nurse will notify you of an appropriate date and time to hold prior to your procedure.  o Coumadin (WARFARIN), Plavix (CLOPIDOGREL), and Effient (PRASUGREL) MUST be stopped 5 days prior to exam unless discussed with the doctor performing the test.   o Eliquis (APIXABAN), Savaysa (EDOXABAN), Arixtra (FONDAPARINUX), and Xarelto (RIVAROXABAN) MUST be stopped 2 days prior to exam unless discussed with the doctor performing the test.  o Do not take Aspirin the morning of procedure.   ? WEIGHT LOSS MEDICATIONS - MUST be stopped 1week prior to your appointment  ? BLOOD PRESSURE, HEART, SEIZURE, LUNG, or PSYCHIATRIC MEDICATIONS you normally take in the morning, please take them the morning of your procedure. This includes Inhalers.   o Please take these medications one hour prior to your arrival time with a small sip of water  o Take your Metoprolol (Toprol XL) and Sacubitril-Valsartan (Entresto) the morning of the procedure with a sip of water if you usually take this in the morning   o Take your Mycophenolate (Cellcept) and Tacrolimus (Prograf) the morning of the procedure with a sip of water if you usually take this in the morning     DIABETIC PATIENTS:   ? Do not take any diabetic medications (including insulin) the morning of the exam.   ? If your blood sugar goes below 70, you may drink 2 ounces of clear juice, soda. Wait 15 minutes, then recheck your blood sugar. If it isn't going up, you may drink another 2 ounces of clear juice and contact the On-Call Nurse line at [phone].   ? Do not take your Glimepiride (Amaryl) or Insulin (Lantus or Novolog) the morning of the procedure     [facility] GOLYTELY, COLYTE, NULYTELY, GENERIC PEG 3350 W/ELECTROLYTES INSTRUCTIONS  Please use this page as a checklist for your preparation.      [] ITEMS TO PURCHASE BEFORE YOUR PROCEDURE:  Please  "purchase the following items from your local pharmacy prior to your appointment.    4 Dulcolax (bisacodyl) laxative tablets- no prescription needed, over the counter.   Gas X (simethicone) 125 mg capsules - no prescription needed, over the counter.    Dramamine (Meclizine) 25 mg tablet (for nausea)- no prescription needed, over the counter.   Prep- A prescription is required and has been sent to your pharmacy    [] FIVE DAYS BEFORE YOUR PROCEDURE: Begin low fiber diet- see attached instructions.    []THE DAY BEFORE YOUR PROCEDURE :(WHEN YOU WAKE)  Begin clear liquids only - no solid foods may be eaten until after your procedure has been performed/completed.  Please drink 1 - 2 gallons of clear liquids throughout the day.   You may consume the following items:    Coffee, water, or tea. (We agree it's odd, but coffee and tea without milk or creamer is considered a clear liquid)    Clear carbonated beverages (soft drinks), ginger ale, sprite, 7up, sparkling water, etc. No "Energy" beverages.    Gelatin dessert, (JELLO) plain or fruit flavored. No red or purple coloring/No solid pieces of fruit.   Apple juice, white grape juice, or white cranberry juice. No pulp, no orange juice.    Gatorade, Powerade, lemonade, or limeade. No red or purple.    Clear, fat-free, beef or chicken broths, or bouillon.    Snowballs, popsicles, slushes. No red or purple coloring, no pulp.    Avoid any liquids not listed above.     [] 12:00 PM (the day before procedure):   Take 4 Dulcolax (bisacodyl) tablets with a glass of clear liquid   Take 1 Gas X (simethicone) 125 mg capsule with a glass of clear liquid    [] 5:00 PM (the day before procedure), for prevention of nausea and vomiting:   Take ½ of a tablet (12.5 mg) of Meclizine every 6 hours as needed for nausea and/or vomiting. DO NOT TAKE MORE THAN 50 MG IN A 24 HOUR PERIOD.   If you have a prescribed anti-nausea medication at home such as Ondansetron (Zofran), you may " take this as prescribed as needed.     [] 6:00 PM (the day before procedure) Begin the first portion of the Golytely or Nulytely prep. (Best if refrigerated)  1. Add cool clear liquid to mixing container up to the fill line and mix.  2. Drink HALF of the mixture in the container.  3. You may refrigerate remaining half of the prep until ready to use the next morning.  Please have this consumed within 1 hour and 30 minutes. ---VERY IMPORTANT---  This entire process is required for the success of the examination.    Clear liquids may be continued until you finish the second portion of the prep. This will help you remain hydrated.    [] 02:00 AM (THE MORNING OF YOUR PROCEDURE): Complete the second portion of your prep.  1. Drink the second HALF of the mixture that was prepared the previous evening.  Please have this consumed within 1 hour and 30 minutes. ---VERY IMPORTANT---  This entire process is required for the success of the examination.      After you complete the bowel prep and the required water/clear liquid, you may not have anything else by mouth except for your medications, with a small sip of water. This includes no gum, mints, tobacco products.    Please follow these instructions to ensure you have a very good prep.  The goal is for stool to be CLEAR OR YELLOW liquid - NOT BROWN.  Avoid having to repeat the procedure due to a poor prep!    Please call [phone] or [phone] if you continue to have brown stool or have any questions about your prep instructions. Remember, due to Anesthesia, you MUST have a responsible person to drive you home!        LOW FIBER DIET  FIVE (5) DAYS BEFORE YOUR PROCEDURE- Please start a Low Fiber Diet as below:  A good preparation (clean out) of the colon is necessary prior to having a colonoscopy in order to ensure that all areas of the colon can be seen without difficulty. It is helpful to avoid high fiber foods prior to having a colonoscopy as high fiber foods  (especially seeds and nuts) are more difficult to completely clear out of the colon. We recommend avoiding high fiber foods for at least 4 days prior to your colonoscopy. If you have issues with constipation you may want to start avoiding high fiber foods 7 days before the procedure.    Foods to Include In Your Diet:       Grain Products:    Enriched refined white bread, buns, bagels, English muffins   Plain cereals e.g., Cheerios, Cornflakes, Cream of Wheat, Rice Krispies, Special K   Tea biscuits, arrowroot cookies, soda crackers, ozzie crackers, plain [place] toast and flour tortillas  White rice, refined pasta and noodles   Fruits:   peel fruits when possible  Fruit juices except prune juice   Soft fruits: apricots, banana (1/2), cantaloupe, canned fruit cocktail, grapes,   honeydew melon, peaches, watermelon, citrus fruits, plums, pineapple   Sauces: Apple or apricot        Vegetables:    Vegetable juices    Tomato sauces    Potatoes (no skin)    Well-cooked and tender vegetables including alfalfa sprouts, spinach, beets, carrots, celery, cucumber, eggplant, lettuce, mushrooms, green/red peppers, squash, zucchini, onions, brussel sprouts, asparagus     Meat and Protein Choice:  Well-cooked, tender meat, fish and eggs            Foods to avoid:   ? Whole grain breads and pastas, corn bread or muffins, products made with whole grain products, bran, seeds, or nuts   ? Strong cheeses, yogurt containing fruit skins or seeds   ? Raw vegetables and pickles  ? All types of whole kernel corn   ? All beans, peas, and legumes   ? Fruit peels and hard fruits   ? Tough meat, meat with gristle   ? Crunchy peanut butter   ? Nuts, seeds and popcorn   ? Millet, buckwheat, flax, oatmeal, madeline seeds   ? Dried fruits, berries, other fruits with pulp or seeds (including blueberries, raspberries, and blackberries)   ? Food containing chocolate, coconut   ? Juices with pulp     Avoiding the recommended foods is one part of helping to  ensure that you have adequate preparation and that you do not need to have a repeat colonoscopy any sooner than what would be recommended by the colon cancer screening guidelines.    Please follow these instructions to ensure you have a very good prep.  The goal is for stool to be CLEAR OR YELLOW liquid - NOT BROWN.  Avoid having to repeat the procedure due to a poor prep!  Please call [phone] or [phone] if you continue to have brown stool or have any questions about your prep instructions.     Questions regarding scheduling: Endoscopy Scheduling [phone] (M-F, 7:30am-4:30pm)   Questions about Insurance/ Financial obligations: Financial Services [phone]   Questions requiring immediate assistance: [facility] On-Call Nurse Line [phone] (After Hours)

## 2023-06-08 VITALS
HEIGHT: 67 IN | SYSTOLIC BLOOD PRESSURE: 100 MMHG | DIASTOLIC BLOOD PRESSURE: 58 MMHG | BODY MASS INDEX: 43.47 KG/M2 | HEART RATE: 88 BPM | TEMPERATURE: 98 F | WEIGHT: 277 LBS | RESPIRATION RATE: 18 BRPM | OXYGEN SATURATION: 98 %

## 2023-06-09 LAB
FINAL PATHOLOGIC DIAGNOSIS: NORMAL
GROSS: NORMAL
Lab: NORMAL

## 2023-06-15 DIAGNOSIS — Z94.0 KIDNEY REPLACED BY TRANSPLANT: ICD-10-CM

## 2023-06-16 RX ORDER — PREDNISONE 5 MG/1
5 TABLET ORAL DAILY
Qty: 30 TABLET | Refills: 11 | Status: SHIPPED | OUTPATIENT
Start: 2023-06-16 | End: 2024-06-15

## 2023-06-21 ENCOUNTER — LAB VISIT (OUTPATIENT)
Dept: LAB | Facility: HOSPITAL | Age: 68
End: 2023-06-21
Attending: INTERNAL MEDICINE
Payer: COMMERCIAL

## 2023-06-21 DIAGNOSIS — E11.21 TYPE 2 DIABETES MELLITUS WITH DIABETIC NEPHROPATHY, UNSPECIFIED WHETHER LONG TERM INSULIN USE: ICD-10-CM

## 2023-06-21 DIAGNOSIS — E11.8 DM (DIABETES MELLITUS), TYPE 2 WITH COMPLICATIONS: ICD-10-CM

## 2023-06-21 DIAGNOSIS — E11.42 DIABETIC PERIPHERAL NEUROPATHY: ICD-10-CM

## 2023-06-21 DIAGNOSIS — Z94.0 KIDNEY TRANSPLANTED: ICD-10-CM

## 2023-06-21 LAB
ALBUMIN SERPL BCP-MCNC: 3.2 G/DL (ref 3.5–5.2)
ALBUMIN/CREAT UR: NORMAL UG/MG (ref 0–30)
ANION GAP SERPL CALC-SCNC: 12 MMOL/L (ref 8–16)
ANISOCYTOSIS BLD QL SMEAR: SLIGHT
BASOPHILS # BLD AUTO: 0.01 K/UL (ref 0–0.2)
BASOPHILS NFR BLD: 0.3 % (ref 0–1.9)
BUN SERPL-MCNC: 33 MG/DL (ref 8–23)
CALCIUM SERPL-MCNC: 9 MG/DL (ref 8.7–10.5)
CHLORIDE SERPL-SCNC: 103 MMOL/L (ref 95–110)
CO2 SERPL-SCNC: 26 MMOL/L (ref 23–29)
CREAT SERPL-MCNC: 2.5 MG/DL (ref 0.5–1.4)
CREAT UR-MCNC: 83 MG/DL (ref 23–375)
DIFFERENTIAL METHOD: ABNORMAL
EOSINOPHIL # BLD AUTO: 0 K/UL (ref 0–0.5)
EOSINOPHIL NFR BLD: 0.6 % (ref 0–8)
ERYTHROCYTE [DISTWIDTH] IN BLOOD BY AUTOMATED COUNT: 13.2 % (ref 11.5–14.5)
EST. GFR  (NO RACE VARIABLE): 27.1 ML/MIN/1.73 M^2
GLUCOSE SERPL-MCNC: 114 MG/DL (ref 70–110)
HCT VFR BLD AUTO: 35.1 % (ref 40–54)
HGB BLD-MCNC: 10.1 G/DL (ref 14–18)
HYPOCHROMIA BLD QL SMEAR: ABNORMAL
IMM GRANULOCYTES # BLD AUTO: 0.15 K/UL (ref 0–0.04)
IMM GRANULOCYTES NFR BLD AUTO: 4.8 % (ref 0–0.5)
LYMPHOCYTES # BLD AUTO: 0.6 K/UL (ref 1–4.8)
LYMPHOCYTES NFR BLD: 19.7 % (ref 18–48)
MCH RBC QN AUTO: 25.7 PG (ref 27–31)
MCHC RBC AUTO-ENTMCNC: 28.8 G/DL (ref 32–36)
MCV RBC AUTO: 89 FL (ref 82–98)
MICROALBUMIN UR DL<=1MG/L-MCNC: <5 UG/ML
MONOCYTES # BLD AUTO: 0.3 K/UL (ref 0.3–1)
MONOCYTES NFR BLD: 10.8 % (ref 4–15)
NEUTROPHILS # BLD AUTO: 2 K/UL (ref 1.8–7.7)
NEUTROPHILS NFR BLD: 63.8 % (ref 38–73)
NRBC BLD-RTO: 0 /100 WBC
OVALOCYTES BLD QL SMEAR: ABNORMAL
PHOSPHATE SERPL-MCNC: 3.1 MG/DL (ref 2.7–4.5)
PLATELET # BLD AUTO: 186 K/UL (ref 150–450)
PLATELET BLD QL SMEAR: ABNORMAL
PMV BLD AUTO: 11.1 FL (ref 9.2–12.9)
POIKILOCYTOSIS BLD QL SMEAR: SLIGHT
POLYCHROMASIA BLD QL SMEAR: ABNORMAL
POTASSIUM SERPL-SCNC: 4 MMOL/L (ref 3.5–5.1)
RBC # BLD AUTO: 3.93 M/UL (ref 4.6–6.2)
SODIUM SERPL-SCNC: 141 MMOL/L (ref 136–145)
WBC # BLD AUTO: 3.14 K/UL (ref 3.9–12.7)

## 2023-06-21 PROCEDURE — 82570 ASSAY OF URINE CREATININE: CPT | Performed by: INTERNAL MEDICINE

## 2023-06-21 PROCEDURE — 80197 ASSAY OF TACROLIMUS: CPT | Performed by: INTERNAL MEDICINE

## 2023-06-21 PROCEDURE — 85025 COMPLETE CBC W/AUTO DIFF WBC: CPT | Performed by: INTERNAL MEDICINE

## 2023-06-21 PROCEDURE — 80069 RENAL FUNCTION PANEL: CPT | Performed by: INTERNAL MEDICINE

## 2023-06-21 PROCEDURE — 36415 COLL VENOUS BLD VENIPUNCTURE: CPT | Performed by: INTERNAL MEDICINE

## 2023-06-22 LAB — TACROLIMUS BLD-MCNC: 6.9 NG/ML (ref 5–15)

## 2023-06-28 ENCOUNTER — OFFICE VISIT (OUTPATIENT)
Dept: NEPHROLOGY | Facility: CLINIC | Age: 68
End: 2023-06-28
Payer: COMMERCIAL

## 2023-06-28 VITALS
BODY MASS INDEX: 43.93 KG/M2 | WEIGHT: 279.88 LBS | SYSTOLIC BLOOD PRESSURE: 138 MMHG | HEIGHT: 67 IN | DIASTOLIC BLOOD PRESSURE: 70 MMHG | HEART RATE: 90 BPM

## 2023-06-28 DIAGNOSIS — Z94.0 KIDNEY TRANSPLANTED: Primary | ICD-10-CM

## 2023-06-28 DIAGNOSIS — Z94.0 KIDNEY REPLACED BY TRANSPLANT: ICD-10-CM

## 2023-06-28 PROCEDURE — 99215 OFFICE O/P EST HI 40 MIN: CPT | Mod: S$GLB,,, | Performed by: INTERNAL MEDICINE

## 2023-06-28 PROCEDURE — 99999 PR PBB SHADOW E&M-EST. PATIENT-LVL V: CPT | Mod: PBBFAC,,, | Performed by: INTERNAL MEDICINE

## 2023-06-28 PROCEDURE — 4010F PR ACE/ARB THEARPY RXD/TAKEN: ICD-10-PCS | Mod: CPTII,S$GLB,, | Performed by: INTERNAL MEDICINE

## 2023-06-28 PROCEDURE — 3078F PR MOST RECENT DIASTOLIC BLOOD PRESSURE < 80 MM HG: ICD-10-PCS | Mod: CPTII,S$GLB,, | Performed by: INTERNAL MEDICINE

## 2023-06-28 PROCEDURE — 3075F PR MOST RECENT SYSTOLIC BLOOD PRESS GE 130-139MM HG: ICD-10-PCS | Mod: CPTII,S$GLB,, | Performed by: INTERNAL MEDICINE

## 2023-06-28 PROCEDURE — 3061F PR NEG MICROALBUMINURIA RESULT DOCUMENTED/REVIEW: ICD-10-PCS | Mod: CPTII,S$GLB,, | Performed by: INTERNAL MEDICINE

## 2023-06-28 PROCEDURE — 4010F ACE/ARB THERAPY RXD/TAKEN: CPT | Mod: CPTII,S$GLB,, | Performed by: INTERNAL MEDICINE

## 2023-06-28 PROCEDURE — 3288F PR FALLS RISK ASSESSMENT DOCUMENTED: ICD-10-PCS | Mod: CPTII,S$GLB,, | Performed by: INTERNAL MEDICINE

## 2023-06-28 PROCEDURE — 3044F HG A1C LEVEL LT 7.0%: CPT | Mod: CPTII,S$GLB,, | Performed by: INTERNAL MEDICINE

## 2023-06-28 PROCEDURE — 3075F SYST BP GE 130 - 139MM HG: CPT | Mod: CPTII,S$GLB,, | Performed by: INTERNAL MEDICINE

## 2023-06-28 PROCEDURE — 1101F PT FALLS ASSESS-DOCD LE1/YR: CPT | Mod: CPTII,S$GLB,, | Performed by: INTERNAL MEDICINE

## 2023-06-28 PROCEDURE — 3078F DIAST BP <80 MM HG: CPT | Mod: CPTII,S$GLB,, | Performed by: INTERNAL MEDICINE

## 2023-06-28 PROCEDURE — 3044F PR MOST RECENT HEMOGLOBIN A1C LEVEL <7.0%: ICD-10-PCS | Mod: CPTII,S$GLB,, | Performed by: INTERNAL MEDICINE

## 2023-06-28 PROCEDURE — 99999 PR PBB SHADOW E&M-EST. PATIENT-LVL V: ICD-10-PCS | Mod: PBBFAC,,, | Performed by: INTERNAL MEDICINE

## 2023-06-28 PROCEDURE — 3008F BODY MASS INDEX DOCD: CPT | Mod: CPTII,S$GLB,, | Performed by: INTERNAL MEDICINE

## 2023-06-28 PROCEDURE — 1126F PR PAIN SEVERITY QUANTIFIED, NO PAIN PRESENT: ICD-10-PCS | Mod: CPTII,S$GLB,, | Performed by: INTERNAL MEDICINE

## 2023-06-28 PROCEDURE — 3008F PR BODY MASS INDEX (BMI) DOCUMENTED: ICD-10-PCS | Mod: CPTII,S$GLB,, | Performed by: INTERNAL MEDICINE

## 2023-06-28 PROCEDURE — 99215 PR OFFICE/OUTPT VISIT, EST, LEVL V, 40-54 MIN: ICD-10-PCS | Mod: S$GLB,,, | Performed by: INTERNAL MEDICINE

## 2023-06-28 PROCEDURE — 3066F PR DOCUMENTATION OF TREATMENT FOR NEPHROPATHY: ICD-10-PCS | Mod: CPTII,S$GLB,, | Performed by: INTERNAL MEDICINE

## 2023-06-28 PROCEDURE — 3061F NEG MICROALBUMINURIA REV: CPT | Mod: CPTII,S$GLB,, | Performed by: INTERNAL MEDICINE

## 2023-06-28 PROCEDURE — 1159F MED LIST DOCD IN RCRD: CPT | Mod: CPTII,S$GLB,, | Performed by: INTERNAL MEDICINE

## 2023-06-28 PROCEDURE — 3066F NEPHROPATHY DOC TX: CPT | Mod: CPTII,S$GLB,, | Performed by: INTERNAL MEDICINE

## 2023-06-28 PROCEDURE — 3288F FALL RISK ASSESSMENT DOCD: CPT | Mod: CPTII,S$GLB,, | Performed by: INTERNAL MEDICINE

## 2023-06-28 PROCEDURE — 1101F PR PT FALLS ASSESS DOC 0-1 FALLS W/OUT INJ PAST YR: ICD-10-PCS | Mod: CPTII,S$GLB,, | Performed by: INTERNAL MEDICINE

## 2023-06-28 PROCEDURE — 1159F PR MEDICATION LIST DOCUMENTED IN MEDICAL RECORD: ICD-10-PCS | Mod: CPTII,S$GLB,, | Performed by: INTERNAL MEDICINE

## 2023-06-28 PROCEDURE — 1126F AMNT PAIN NOTED NONE PRSNT: CPT | Mod: CPTII,S$GLB,, | Performed by: INTERNAL MEDICINE

## 2023-06-28 RX ORDER — MYCOPHENOLATE MOFETIL 250 MG/1
500 CAPSULE ORAL 2 TIMES DAILY
Qty: 120 CAPSULE | Refills: 11 | Status: SHIPPED | OUTPATIENT
Start: 2023-06-28 | End: 2024-06-27

## 2023-06-28 NOTE — PROGRESS NOTES
Renal clinic f/u note:  Date of clinic visit: 6/28/23  Reason for f/u and chief c/o: h/o of kidney transplant. CHF, CKD. Recent diarrhea and positive CMV titer     HPI: Pt is a 66 y/o male with h/o of living related kidney transplant from his daughter in 2015 who presents for f/u. 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 He was last seen by us in 1 month ago. Chart was reviewed. Labs, meds, immunosuppressive meds and doses reviewed with pt. Pt also saw kidney transplant in [place] in [place] 2023. Cellcept noted was lowered from 750 to 500 mg bid due to leukopenia.     Pt has ongoing issues related to suspected CMV infection. To review, pt had been admitted to [facility] for ANGELITO (Cr 4.3) and diarrhea in Dec 2022. The cause for the latter was not clear. He did not have colonoscopy. CMV titer by PCR was positive and he was presumably treated for CMV colitis, inially with ganciclovir, then valcyte x 21 days on discharge. Diarrhea resolved right away after starting the anti-viral meds.Pt was d/okki on valcyte and took it x 21 days. CMV titer turned positive again in April 2023. Valcyte was re-started with renal dose adjustment. Pt has been taking it. Pt is now s/p colonoscopy on 6/7/23, which found no sign of viral induced inflammation. He reports no diarrhea today. No new c/o's, no GI sx's.     Pt also has a h/o of CHF. He says he is drinking more water than before. Pt denies SOB, has leg swelling.         PAST MEDICAL HISTORY: living related kidney transplant form daughter 6/15/2015 (on HD pre-transplant x 2.5 years), CKD stage 3 to 4, DM-2, HTN, CAD (coronary artery disease), combined diastolic and systolic (EF 40%) CHF, Chronic venous insufficiency of lower extremity, Diabetic retinopathy, Gallbladder polyp, 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, History of colon polyps, HPTH (hyperparathyroidism), Hyperlipidemia, LBBB (left bundle branch block) (12/20/2021), Morbid  "obesity with BMI of 45.0-49.9, adult, PCO (posterior capsular opacification), left (3/4/2019), Sleep apnea,     PAST SURGICAL HISTORY:  He  has a past surgical history that includes Retinal laser procedure; Cardiac catheterization (2008); Kidney transplant; and Colonoscopy (N/A, 4/5/2018).     SOCIAL HISTORY:  He  reports that he quit smoking about 8 years ago. He quit smokeless tobacco use about 8 years ago. He reports that he does not drink alcohol and does not use drugs.     FAMILY MEDICAL HISTORY:  His family history includes Diabetes in his maternal grandmother, mother, and sister; Heart failure in his mother; Hypertension in his mother; Kidney disease in his sister.               Review of patient's allergies indicates:   Allergen Reactions    Lisinopril Other (See Comments)       Other reaction(s):  cough    Actos  [pioglitazone]         Other reaction(s): chf    Metformin         Other reaction(s): renal insuff  Other reaction(s): chf      Meds reviewed    Current Outpatient Medications:     amitriptyline (ELAVIL) 25 MG tablet, Take 1 tablet (25 mg total) by mouth nightly as needed for Insomnia., Disp: 30 tablet, Rfl: 2    apixaban (ELIQUIS) 5 mg Tab, Take 1 tablet (5 mg total) by mouth 2 (two) times daily., Disp: 180 tablet, Rfl: 1    aspirin (ECOTRIN) 81 MG EC tablet, Take 1 tablet by mouth Daily. , Disp: , Rfl:     BD ULTRA-FINE MINI PEN NEEDLE 31 gauge x 3/16" Ndle, Use to inject insulin as needed up to 4 times daily, Disp: 100 each, Rfl: 3    blood sugar diagnostic Strp, Use to test four times per day, Disp: 150 strip, Rfl: 11    blood sugar diagnostic Strp, 100 each by Misc.(Non-Drug; Combo Route) route 4 (four) times daily before meals and nightly., Disp: 100 each, Rfl: 1    blood-glucose meter (FREESTYLE LITE METER) kit, 1 each 4 (four) times daily., Disp: 1 each, Rfl: 0    calcitRIOL (ROCALTROL) 0.25 MCG Cap, Take 1 capsule (0.25 mcg total) by mouth once daily., Disp: 30 capsule, Rfl: 11    " "diclofenac sodium (VOLTAREN) 1 % Gel, Apply 2 g topically 3 (three) times daily., Disp: 450 g, Rfl: 2    famotidine (PEPCID) 20 MG tablet, TAKE ONE TABLET BY MOUTH EVERY EVENING, Disp: 30 tablet, Rfl: 11    fish oil-omega-3 fatty acids 300-1,000 mg capsule, Take 1 g by mouth once daily., Disp: , Rfl:     glimepiride (AMARYL) 2 MG tablet, Take 1 tablet (2 mg total) by mouth before breakfast., Disp: 90 tablet, Rfl: 3    insulin (LANTUS SOLOSTAR U-100 INSULIN) glargine 100 units/mL SubQ pen, Inject 35 Units into the skin 2 (two) times daily., Disp: 30 mL, Rfl: 2    insulin aspart U-100 (NOVOLOG FLEXPEN U-100 INSULIN) 100 unit/mL (3 mL) InPn pen, Inject 25 Units into the skin 3 (three) times daily with meals., Disp: 30 mL, Rfl: 2    ketoconazole (NIZORAL) 200 mg Tab, Take HALF A tablet (100 mg total) by mouth once daily., Disp: 15 tablet, Rfl: 9    lancets (MICROLET LANCET) Misc, 100 lancets by Misc.(Non-Drug; Combo Route) route 4 (four) times daily before meals and nightly., Disp: 100 each, Rfl: 11    magnesium oxide (MAG-OX) 400 mg (241.3 mg magnesium) tablet, Take 1 tablet (400 mg total) by mouth once daily., Disp: 90 tablet, Rfl: 1    metoprolol succinate (TOPROL-XL) 25 MG 24 hr tablet, Take 1 tablet (25 mg total) by mouth once daily., Disp: 90 tablet, Rfl: 1    montelukast (SINGULAIR) 10 mg tablet, Take 1 tablet (10 mg total) by mouth every evening., Disp: 90 tablet, Rfl: 1    multivitamin (THERAGRAN) tablet, Take 1 tablet by mouth once daily., Disp: , Rfl:     ondansetron (ZOFRAN) 4 MG tablet, Take 1 tablet (4 mg total) by mouth every 6 (six) hours., Disp: 30 tablet, Rfl: 0    pen needle, diabetic (BD INSULIN PEN NEEDLE UF SHORT) 31 gauge x 5/16" Ndle, USE TO INJECT INSULIN TWICE A DAY, Disp: 200 each, Rfl: 5    potassium chloride SA (K-DUR,KLOR-CON) 20 MEQ tablet, Take 2 tablets (40 mEq total) by mouth once daily., Disp: 180 tablet, Rfl: 1    predniSONE (DELTASONE) 5 MG tablet, Take 1 tablet (5 mg total) by mouth " once daily., Disp: 30 tablet, Rfl: 11    promethazine-dextromethorphan (PROMETHAZINE-DM) 6.25-15 mg/5 mL Syrp, Take 5 mLs by mouth every 6 (six) hours as needed (cough)., Disp: 180 mL, Rfl: 0    rosuvastatin (CRESTOR) 40 MG Tab, Take 1 tablet (40 mg total) by mouth once daily in the evening., Disp: 90 tablet, Rfl: 1    sacubitriL-valsartan (ENTRESTO)  mg per tablet, Take 1 tablet by mouth 2 (two) times daily., Disp: 180 tablet, Rfl: 1    tacrolimus (PROGRAF) 1 MG Cap, Take 4 capsules (4 mg total) by mouth every 12 (twelve) hours., Disp: 240 capsule, Rfl: 11    tamsulosin (FLOMAX) 0.4 mg Cap, Take 1 capsule (0.4 mg total) by mouth once daily., Disp: 30 capsule, Rfl: 11    triamcinolone acetonide 0.1% (KENALOG) 0.1 % ointment, Apply topically 2 (two) times daily. Use on bilateral lower legs., Disp: 454 g, Rfl: 1    valGANciclovir (VALCYTE) 450 mg Tab, Take 1 tablet (450 mg total) by mouth every other day., Disp: 45 tablet, Rfl: 0    mycophenolate (CELLCEPT) 250 mg Cap, Take 2 capsules (500 mg total) by mouth 2 (two) times daily., Disp: 120 capsule, Rfl: 11    Current Facility-Administered Medications:     acetaminophen tablet 650 mg, 650 mg, Oral, Once PRN, Sal Lopez MD        REVIEW OF SYSTEMS:  Patient has no fever, fatigue, visual changes, chest pain, edema, cough, dyspnea, nausea, vomiting, constipation, diarrhea, arthralgias, pruritis, dizziness, weakness, depression, confusion.     PHYSICAL EXAM:  Blood pressure 138/70, pulse 90, resp. rate 20, weight 279 lbs, from 271 lbs, from 287 lbs  Gen:    WDWN male in no apparent distress  Psych: Normal mood and affect  Skin:    No rashes or ulcers  Neck:   No JVD  Chest:  Clear with no rales, rhonchi, wheezing with normal effort  CV:      Regular with no murmurs, gallops or rubs  Abd:     Soft, nontender, no distension  Ext:      2+ edema     Labs reviewed  BMP  Lab Results   Component Value Date     06/21/2023    K 4.0 06/21/2023     06/21/2023     CO2 26 06/21/2023    BUN 33 (H) 06/21/2023    CREATININE 2.5 (H) 06/21/2023    CALCIUM 9.0 06/21/2023    ANIONGAP 12 06/21/2023    EGFRNORACEVR 27.1 (A) 06/21/2023     Lab Results   Component Value Date    WBC 3.14 (L) 06/21/2023    HGB 10.1 (L) 06/21/2023    HCT 35.1 (L) 06/21/2023    MCV 89 06/21/2023     06/21/2023       Lab Results   Component Value Date    .5 (H) 05/17/2023    CALCIUM 9.0 06/21/2023    PHOS 3.1 06/21/2023     Component Ref Range & Units 7 d ago  (6/21/23) 1 mo ago  (5/17/23) 1 mo ago  (5/9/23) 2 mo ago  (4/4/23) 6 mo ago  (12/30/22) 6 mo ago  (12/16/22) 6 mo ago  (12/8/22)   Cytomegalovirus PCR, Quant <50 IU/mL Not Detected  Not Detected  Not Detected  78 Abnormal   Not Detected  <50 Abnormal   108 Abnormal     Cytomegalovirus DNA Not Detected Not Detected  Not Detected  Not Detected   Not Detected  Detected Abnormal   CANCELED R, CM    Cytomegalovirus Log (IU/mL) <1.70 LogIU/mL Not Detected  Not Detected CM  Not Detected CM  1.89 Abnormal  CM  Not Detected CM  <1.70 Abnormal  CM           Prograf level 6.9         Cardiac study: 1/18/22 reviewed:  Conclusion  Planar imaging demonstrates cardiac activity that is absent to that of the adjacent rib cage.  Study is negative for TTR amyloidosis with an uptake ratio of 1.03.  Perugini Score of 0     Echo 7/27/21 reviewed:  The left ventricle is normal in size with concentric hypertrophy and mildly decreased systolic function.  The estimated ejection fraction is 40%.  Normal right ventricular size with normal right ventricular systolic function.  Indeterminate left ventricular diastolic function.  Moderate left atrial enlargement.           IMPRESSION AND RECOMMENDATIONS:  68 y/o male with h/o of kidney transplant present for f/u    1. Renal: s Cr stable, within baseline range  Stable renal function. CKD stage 4  Prior ANGELITO, due to diarrhea, has resolved     Cr tends to fluctuate, but overall slowly worsening over several  years  Reason for s Cr fluctuations is the use of diuretics and h/o of CHF. Pre-renal azotemia  OK to continue diuretics (despite changes in Cr) as pt will need diuretics for fluid balance  The dose of the diuretics should be proportional to the amount of fluid gain     H/o of DM and HTN  Was on HD x 2.5 years before transplant     Complications of CKD:  K normal  Na normal  Acid base stable, metabolic alkalosis due to diuretics  Ca normal  PO4 stable  Secondary hyperparathyroidism, on calcitriol   Anemia, mild     2. Immunosuppression  H/o of living related kidney transplant in 2015 form daughter  Prograf level is within the therapeutic range  No chanage in dose, continue 4 mg po bid  Meds and doses reviewed with pt    Leukopenia; small drop in WBC  Noted NO lowered cellcept dose from 750 to 500 mg po bid     3. Diarrhea in Dec 2022. Has resolved  Positive CMV titer by PCR, now negative since 5/9/23  S/p colonoscopy on 6/7/23, found no sign of virally induced inflammation  Re-started valcyte at renal dosing in April 2023, will continue it x 3 months post last negative titer  No sign of CMV colitis on colonoscopy  May d/c valcyte or continue x 3 months post last negative CMV titer     4. Cardiac: diastolic and systolic CHF  Seems to have worsened fluid status at present, drinking more water than before  Noted worsened leg swelling and gain of 8 lbs  Advised pt to:  Limit salt in diet <2-3 g/day  Limit fluids < 1.5 L/day  Continue lasix 40 mg po bid (dose says 80 mg bid)  Metolazone prn     Previously cardiac amyloid was suspected, was ruled out by above cardiac test  SPEP and UPEP unremarkable, no monoclonality     5. HTN: BP normal to low  Meds reviewed      Plans and recommendations:  As discussed above  Total time spent 40 minutes including time needed to review the records, the   patient evaluation, documentation, face-to-face discussion with the patient,   more than 50% of the time was spent on coordination of  care and counseling.    Level V visit.  RTC 3 months     Hany Gonsalez MD

## 2023-07-04 NOTE — TELEPHONE ENCOUNTER
Refill Routing Note   Medication(s) are not appropriate for processing by [facility] for the following reason(s):      Responsible provider unclear    [facility] action(s):  Defer None identified          Appointments  past 12m or future 3m with PCP    Date Provider   Last Visit   Visit date not found Michelle Cody PA-C   Next Visit   Visit date not found Michelle Cody PA-C   ED visits in past 90 days: 0        Note composed:8:46 AM 07/04/2023

## 2023-07-05 RX ORDER — VALGANCICLOVIR 450 MG/1
450 TABLET, FILM COATED ORAL EVERY OTHER DAY
Qty: 45 TABLET | Refills: 0 | Status: SHIPPED | OUTPATIENT
Start: 2023-07-05 | End: 2023-10-12 | Stop reason: SDUPTHER

## 2023-07-05 RX ORDER — ROSUVASTATIN CALCIUM 40 MG/1
40 TABLET, COATED ORAL DAILY
Qty: 90 TABLET | Refills: 1 | Status: SHIPPED | OUTPATIENT
Start: 2023-07-05 | End: 2023-12-27 | Stop reason: SDUPTHER

## 2023-07-07 ENCOUNTER — PATIENT MESSAGE (OUTPATIENT)
Dept: PHARMACY | Facility: CLINIC | Age: 68
End: 2023-07-07
Payer: COMMERCIAL

## 2023-07-08 ENCOUNTER — HOSPITAL ENCOUNTER (OUTPATIENT)
Dept: RADIOLOGY | Facility: CLINIC | Age: 68
Discharge: HOME | End: 2023-07-08
Attending: NURSE PRACTITIONER
Payer: COMMERCIAL

## 2023-07-08 ENCOUNTER — OFFICE VISIT (OUTPATIENT)
Dept: URGENT CARE | Facility: CLINIC | Age: 68
End: 2023-07-08
Payer: COMMERCIAL

## 2023-07-08 VITALS
TEMPERATURE: 99 F | RESPIRATION RATE: 20 BRPM | SYSTOLIC BLOOD PRESSURE: 120 MMHG | OXYGEN SATURATION: 96 % | DIASTOLIC BLOOD PRESSURE: 56 MMHG | HEIGHT: 67 IN | HEART RATE: 112 BPM | WEIGHT: 279 LBS | BODY MASS INDEX: 43.79 KG/M2

## 2023-07-08 DIAGNOSIS — M25.531 RIGHT WRIST PAIN: ICD-10-CM

## 2023-07-08 DIAGNOSIS — S66.911A SPRAIN AND STRAIN OF RIGHT WRIST: ICD-10-CM

## 2023-07-08 DIAGNOSIS — S63.501A SPRAIN AND STRAIN OF RIGHT WRIST: ICD-10-CM

## 2023-07-08 DIAGNOSIS — M25.531 RIGHT WRIST PAIN: Primary | ICD-10-CM

## 2023-07-08 PROCEDURE — 99213 OFFICE O/P EST LOW 20 MIN: CPT | Mod: S$GLB,,, | Performed by: NURSE PRACTITIONER

## 2023-07-08 PROCEDURE — 73110 X-RAY EXAM OF WRIST: CPT | Mod: RT,S$GLB,, | Performed by: RADIOLOGY

## 2023-07-08 PROCEDURE — 73110 XR WRIST COMPLETE 3 VIEWS RIGHT: ICD-10-PCS | Mod: RT,S$GLB,, | Performed by: RADIOLOGY

## 2023-07-08 PROCEDURE — 99213 PR OFFICE/OUTPT VISIT, EST, LEVL III, 20-29 MIN: ICD-10-PCS | Mod: S$GLB,,, | Performed by: NURSE PRACTITIONER

## 2023-07-08 NOTE — PATIENT INSTRUCTIONS
PRICE therapy:  Protect the joint with stabilizing brace like a neoprene sleeve or taping.   Rest the extremity--limit activity with this area  Ice 2-3 times a day for 20 minute intervals for first 48 hours or until inflammation has stabilized   Compression to provide support and help prevent swelling  Elevation above heart level to help decrease swelling  X-ray results will post to your my [facility] account.     Please arrange follow up with your primary medical clinic as soon as possible. You must understand that you've received an Urgent Care treatment only and that you may be released before all of your medical problems are known or treated. You, the patient, will arrange for follow up as instructed. If your symptoms worsen or fail to improve you should go to the Emergency Room.

## 2023-07-08 NOTE — PROGRESS NOTES
"Subjective:      Patient ID: Mitch Whittaker is a 67 y.o. male.    Vitals:  height is 5' 7" (1.702 m) and weight is 126.6 kg (279 lb). His tympanic temperature is 98.5 °F (36.9 °C). His blood pressure is 120/56 (abnormal) and his pulse is 112 (abnormal). His respiration is 20 and oxygen saturation is 96%.     Chief Complaint: Wrist Pain    Mitch Whittaker is a 67 year old male whom presents to urgent care with right wrist pain that started after lifting a heavy water bottle to place on top of the water dispenser machine.   Patient reports swelling and decreased ROM. Patient denies any numbness or tingling.     Wrist Pain   The pain is present in the right wrist. This is a new problem. The current episode started in the past 7 days (2). The problem has been gradually worsening. The pain is at a severity of 9/10. The pain is severe. Associated symptoms include joint swelling, a limited range of motion and stiffness. Pertinent negatives include no fever, inability to bear weight, itching, joint locking, numbness or tingling. He has tried acetaminophen for the symptoms. The treatment provided no relief.     Constitution: Negative for fever.   Musculoskeletal:  Positive for abnormal ROM of joint.   Neurological:  Negative for numbness.    Objective:     Physical Exam   HENT:   Head: Normocephalic and atraumatic.   Nose: Nose normal.   Mouth/Throat: Mucous membranes are moist. Oropharynx is clear.   Abdominal: Normal appearance.   Musculoskeletal:         General: Swelling and tenderness present. No deformity.      Right wrist: He exhibits decreased range of motion, tenderness, bony tenderness and swelling. He exhibits no crepitus.      Left wrist: Normal.   Neurological: He is alert.   Nursing note and vitals reviewed.  X-Ray Wrist Complete 3 views Right    Result Date: 7/8/2023  EXAM:  XR WRIST COMPLETE 3 VIEWS RIGHT. CLINICAL HISTORY: Pain in right wrist TECHNIQUE:  3 views right wrist COMPARISON STUDIES: None. " FINDINGS: There is no fracture or dislocation. There is no significant arthritic change. No significant focal osseous lesions. Arterial vascular calcifications.  Surgical clips in the distal radial and volar aspect of the forearm.     No acute findings. Finalized on: 7/8/2023 12:22 PM By:  Stu Kowalski MD [facility]# [id number]      2023-07-08 12:24:[id number]    [facility]     Assessment:     1. Right wrist pain    2. Sprain and strain of right wrist        Plan:       Right wrist pain  -     X-Ray Wrist Complete 3 views Right; Future; Expected date: 07/08/2023  -     THUMB ORTHOSIS SPLINT UNIVERSAL FOR HOME USE    Sprain and strain of right wrist  -     X-Ray Wrist Complete 3 views Right; Future; Expected date: 07/08/2023  -     THUMB ORTHOSIS SPLINT UNIVERSAL FOR HOME USE          Medical Decision Making:   Differential Diagnosis:   Sprain vs strain vs dislocation vs fracture  Clinical Tests:   Radiological Study: Ordered and Reviewed  Urgent Care Management:  Previous encounters were independently reviewed. X-ray results were reviewed. Discussed P.R.I.C.E therapy. Additional plan of care as outlined above. Tylenol as needed for pain. Follow up with PCP or orthopedic specialist if pain worsen or persist . Follow up in the ER with any new/concerning symptoms. Patient verbalized understanding and agrees with the discussed plan of care. Patient remained stable throughout the visit and exited the exam room in NAD.         Patient Instructions   PRICE therapy:  Protect the joint with stabilizing brace like a neoprene sleeve or taping.   Rest the extremity--limit activity with this area  Ice 2-3 times a day for 20 minute intervals for first 48 hours or until inflammation has stabilized   Compression to provide support and help prevent swelling  Elevation above heart level to help decrease swelling  X-ray results will post to your my [employer] account.     Please arrange follow up with your primary medical clinic as soon as possible. You  must understand that you've received an Urgent Care treatment only and that you may be released before all of your medical problems are known or treated. You, the patient, will arrange for follow up as instructed. If your symptoms worsen or fail to improve you should go to the Emergency Room.

## 2023-07-11 ENCOUNTER — TELEPHONE (OUTPATIENT)
Dept: URGENT CARE | Facility: CLINIC | Age: 68
End: 2023-07-11
Payer: COMMERCIAL

## 2023-07-11 ENCOUNTER — TELEPHONE (OUTPATIENT)
Dept: ORTHOPEDICS | Facility: CLINIC | Age: 68
End: 2023-07-11
Payer: COMMERCIAL

## 2023-07-11 NOTE — TELEPHONE ENCOUNTER
Returned pt's call and gave him final XR report from 7/8. Pt states he has f/u with Hand specialist in the morning.

## 2023-07-11 NOTE — TELEPHONE ENCOUNTER
Called the patient in regards to their appointment on Thursday 07/13/23 with Janelle. Called to see what body part will they be coming in for. Patient states that they are coming in for right hand/ wrist pain. Informed them that Janelle does not treat the hand/ wrist she only treat's knee's. I got the patient schedule to see Dr. Almonte on tomorrow at 8:00. Patient verbalized understanding.

## 2023-07-11 NOTE — TELEPHONE ENCOUNTER
Courtesy call made for 07/08. Pt reported he is still experiencing hand swelling. I let pt know per providers notes that if pain or sxs persist he should f/u with pcp or ortho specialist.

## 2023-07-12 ENCOUNTER — OFFICE VISIT (OUTPATIENT)
Dept: ORTHOPEDICS | Facility: CLINIC | Age: 68
End: 2023-07-12
Payer: COMMERCIAL

## 2023-07-12 VITALS — HEIGHT: 67 IN | WEIGHT: 279 LBS | BODY MASS INDEX: 43.79 KG/M2

## 2023-07-12 DIAGNOSIS — E11.22 HYPERTENSION ASSOCIATED WITH STAGE 3 CHRONIC KIDNEY DISEASE DUE TO TYPE 2 DIABETES MELLITUS: Primary | ICD-10-CM

## 2023-07-12 DIAGNOSIS — I12.9 HYPERTENSION ASSOCIATED WITH STAGE 3 CHRONIC KIDNEY DISEASE DUE TO TYPE 2 DIABETES MELLITUS: Primary | ICD-10-CM

## 2023-07-12 DIAGNOSIS — G56.03 BILATERAL CARPAL TUNNEL SYNDROME: Primary | ICD-10-CM

## 2023-07-12 DIAGNOSIS — Z01.818 PREOP TESTING: ICD-10-CM

## 2023-07-12 DIAGNOSIS — N18.30 HYPERTENSION ASSOCIATED WITH STAGE 3 CHRONIC KIDNEY DISEASE DUE TO TYPE 2 DIABETES MELLITUS: Primary | ICD-10-CM

## 2023-07-12 DIAGNOSIS — S60.221A TRAUMATIC HEMATOMA OF RIGHT HAND, INITIAL ENCOUNTER: Primary | ICD-10-CM

## 2023-07-12 PROCEDURE — 99214 OFFICE O/P EST MOD 30 MIN: CPT | Mod: S$GLB,,, | Performed by: ORTHOPAEDIC SURGERY

## 2023-07-12 PROCEDURE — 1125F AMNT PAIN NOTED PAIN PRSNT: CPT | Mod: CPTII,S$GLB,, | Performed by: ORTHOPAEDIC SURGERY

## 2023-07-12 PROCEDURE — 99999 PR PBB SHADOW E&M-EST. PATIENT-LVL IV: ICD-10-PCS | Mod: PBBFAC,,, | Performed by: ORTHOPAEDIC SURGERY

## 2023-07-12 PROCEDURE — 4010F PR ACE/ARB THEARPY RXD/TAKEN: ICD-10-PCS | Mod: CPTII,S$GLB,, | Performed by: ORTHOPAEDIC SURGERY

## 2023-07-12 PROCEDURE — 3061F NEG MICROALBUMINURIA REV: CPT | Mod: CPTII,S$GLB,, | Performed by: ORTHOPAEDIC SURGERY

## 2023-07-12 PROCEDURE — 3044F PR MOST RECENT HEMOGLOBIN A1C LEVEL <7.0%: ICD-10-PCS | Mod: CPTII,S$GLB,, | Performed by: ORTHOPAEDIC SURGERY

## 2023-07-12 PROCEDURE — 3044F HG A1C LEVEL LT 7.0%: CPT | Mod: CPTII,S$GLB,, | Performed by: ORTHOPAEDIC SURGERY

## 2023-07-12 PROCEDURE — 1125F PR PAIN SEVERITY QUANTIFIED, PAIN PRESENT: ICD-10-PCS | Mod: CPTII,S$GLB,, | Performed by: ORTHOPAEDIC SURGERY

## 2023-07-12 PROCEDURE — 1101F PT FALLS ASSESS-DOCD LE1/YR: CPT | Mod: CPTII,S$GLB,, | Performed by: ORTHOPAEDIC SURGERY

## 2023-07-12 PROCEDURE — 1159F MED LIST DOCD IN RCRD: CPT | Mod: CPTII,S$GLB,, | Performed by: ORTHOPAEDIC SURGERY

## 2023-07-12 PROCEDURE — 3288F PR FALLS RISK ASSESSMENT DOCUMENTED: ICD-10-PCS | Mod: CPTII,S$GLB,, | Performed by: ORTHOPAEDIC SURGERY

## 2023-07-12 PROCEDURE — 3066F NEPHROPATHY DOC TX: CPT | Mod: CPTII,S$GLB,, | Performed by: ORTHOPAEDIC SURGERY

## 2023-07-12 PROCEDURE — 99214 PR OFFICE/OUTPT VISIT, EST, LEVL IV, 30-39 MIN: ICD-10-PCS | Mod: S$GLB,,, | Performed by: ORTHOPAEDIC SURGERY

## 2023-07-12 PROCEDURE — 99999 PR PBB SHADOW E&M-EST. PATIENT-LVL IV: CPT | Mod: PBBFAC,,, | Performed by: ORTHOPAEDIC SURGERY

## 2023-07-12 PROCEDURE — 3008F BODY MASS INDEX DOCD: CPT | Mod: CPTII,S$GLB,, | Performed by: ORTHOPAEDIC SURGERY

## 2023-07-12 PROCEDURE — 1101F PR PT FALLS ASSESS DOC 0-1 FALLS W/OUT INJ PAST YR: ICD-10-PCS | Mod: CPTII,S$GLB,, | Performed by: ORTHOPAEDIC SURGERY

## 2023-07-12 PROCEDURE — 4010F ACE/ARB THERAPY RXD/TAKEN: CPT | Mod: CPTII,S$GLB,, | Performed by: ORTHOPAEDIC SURGERY

## 2023-07-12 PROCEDURE — 1159F PR MEDICATION LIST DOCUMENTED IN MEDICAL RECORD: ICD-10-PCS | Mod: CPTII,S$GLB,, | Performed by: ORTHOPAEDIC SURGERY

## 2023-07-12 PROCEDURE — 3061F PR NEG MICROALBUMINURIA RESULT DOCUMENTED/REVIEW: ICD-10-PCS | Mod: CPTII,S$GLB,, | Performed by: ORTHOPAEDIC SURGERY

## 2023-07-12 PROCEDURE — 3066F PR DOCUMENTATION OF TREATMENT FOR NEPHROPATHY: ICD-10-PCS | Mod: CPTII,S$GLB,, | Performed by: ORTHOPAEDIC SURGERY

## 2023-07-12 PROCEDURE — 3288F FALL RISK ASSESSMENT DOCD: CPT | Mod: CPTII,S$GLB,, | Performed by: ORTHOPAEDIC SURGERY

## 2023-07-12 PROCEDURE — 3008F PR BODY MASS INDEX (BMI) DOCUMENTED: ICD-10-PCS | Mod: CPTII,S$GLB,, | Performed by: ORTHOPAEDIC SURGERY

## 2023-07-12 RX ORDER — GABAPENTIN 100 MG/1
100 CAPSULE ORAL 3 TIMES DAILY
Qty: 90 CAPSULE | Refills: 11 | Status: SHIPPED | OUTPATIENT
Start: 2023-07-12 | End: 2024-07-11

## 2023-07-12 NOTE — PROGRESS NOTES
Subjective:     Patient ID: Mitch Whittaker is a 67 y.o. male.    Chief Complaint: Pain and Swelling of the Right Hand and Pain and Swelling of the Right Wrist      HPI:  The patient is a 67-year-old male who hit his right hand on a [place] would bottle of water and had onset of swelling and pain 07/06/2023.  He had nerve conduction studies done April 2023 that did confirm bilateral carpal tunnel syndrome.  This episode of pain and swelling made his symptoms worse.  He wishes to have a right carpal tunnel release.    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 RNA NEGATIVE 5/2015, 2021, 2022    History of colon polyps     HPTH (hyperparathyroidism)     Hyperlipidemia     Hypertension associated with stage 3 chronic kidney disease due to type 2 diabetes mellitus     LBBB (left bundle branch block) 12/20/2021    Morbid obesity with BMI of 45.0-49.9, adult     Nephrolithiasis 6/7/2013    PCO (posterior capsular opacification), left 03/04/2019    Proteinuria     resolved s/p transplant    S/P kidney transplant     Sleep apnea     Type 2 diabetes, uncontrolled, with retinopathy     Type II diabetes mellitus with renal manifestations      Past Surgical History:   Procedure Laterality Date     CARDIAC CATHETERIZATION  2008    normal coronary    COLONOSCOPY N/A 4/5/2018    Procedure: COLONOSCOPY;  Surgeon: Chava Ronquillo MD;  Location: [facility] ENDO;  Service: Endoscopy;  Laterality: N/A;    COLONOSCOPY N/A 5/2/2022    Procedure: COLONOSCOPY;  Surgeon: Alix Puente MD;  Location: [facility] ENDO;  Service: Endoscopy;  Laterality: N/A;    COLONOSCOPY N/A 6/7/2023    Procedure: COLONOSCOPY - rule out CMV  Cardiac clearance/Eliquis hold approval received on 05/21/23 per Dr. Meade, cardiology.  Note in encounters.  LB;  Surgeon: Daniella Shah MD;  Location: [facility] ENDO;  Service: Endoscopy;  Laterality: N/A;    KIDNEY TRANSPLANT      RETINAL LASER PROCEDURE       Family History   Problem Relation Age of Onset    Diabetes Mother     Hypertension Mother     Heart failure Mother     Kidney disease Sister         ESRD    Diabetes Sister     Diabetes Maternal Grandmother     Cancer Neg Hx      Social History     Socioeconomic History    Marital status:     Number of children: 2   Occupational History    Occupation: retired     Employer: Retired   Tobacco Use    Smoking status: Former     Types: Cigarettes     Quit date: 5/25/2013     Years since quitting: 10.1     Passive exposure: Past    Smokeless tobacco: Former     Quit date: 6/11/2013    Tobacco comments:     used marijuana since 1175-9782, stopped after started dialysis   Substance and Sexual Activity    Alcohol use: No     Alcohol/week: 0.0 standard drinks    Drug use: No     Comment:      Sexual activity: Never   Social History Narrative    . Lives with spouse. Has 2 children. 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     Medication List with Changes/Refills   Current Medications    AMITRIPTYLINE (ELAVIL) 25 MG TABLET    Take 1 tablet (25 mg total) by mouth nightly as needed for Insomnia.    APIXABAN (ELIQUIS) 5 MG TAB    Take 1 tablet (5 mg total) by mouth 2 (two) times daily.    ASPIRIN (ECOTRIN) 81 MG  "EC TABLET    Take 1 tablet by mouth Daily.     BD ULTRA-FINE MINI PEN NEEDLE 31 GAUGE X 3/16" NDLE    Use to inject insulin as needed up to 4 times daily    BLOOD SUGAR DIAGNOSTIC STRP    Use to test four times per day    BLOOD SUGAR DIAGNOSTIC STRP    100 each by Misc.(Non-Drug; Combo Route) route 4 (four) times daily before meals and nightly.    BLOOD-GLUCOSE METER (FREESTYLE LITE METER) KIT    1 each 4 (four) times daily.    CALCITRIOL (ROCALTROL) 0.25 MCG CAP    Take 1 capsule (0.25 mcg total) by mouth once daily.    DICLOFENAC SODIUM (VOLTAREN) 1 % GEL    Apply 2 g topically 3 (three) times daily.    FAMOTIDINE (PEPCID) 20 MG TABLET    TAKE ONE TABLET BY MOUTH EVERY EVENING    FISH OIL-OMEGA-3 FATTY ACIDS 300-1,000 MG CAPSULE    Take 1 g by mouth once daily.    GLIMEPIRIDE (AMARYL) 2 MG TABLET    Take 1 tablet (2 mg total) by mouth before breakfast.    INSULIN (LANTUS SOLOSTAR U-100 INSULIN) GLARGINE 100 UNITS/ML SUBQ PEN    Inject 35 Units into the skin 2 (two) times daily.    INSULIN ASPART U-100 (NOVOLOG FLEXPEN U-100 INSULIN) 100 UNIT/ML (3 ML) INPN PEN    Inject 25 Units into the skin 3 (three) times daily with meals.    KETOCONAZOLE (NIZORAL) 200 MG TAB    Take HALF A tablet (100 mg total) by mouth once daily.    LANCETS (MICROLET LANCET) MISC    100 lancets by Misc.(Non-Drug; Combo Route) route 4 (four) times daily before meals and nightly.    MAGNESIUM OXIDE (MAG-OX) 400 MG (241.3 MG MAGNESIUM) TABLET    Take 1 tablet (400 mg total) by mouth once daily.    METOPROLOL SUCCINATE (TOPROL-XL) 25 MG 24 HR TABLET    Take 1 tablet (25 mg total) by mouth once daily.    MONTELUKAST (SINGULAIR) 10 MG TABLET    Take 1 tablet (10 mg total) by mouth every evening.    MULTIVITAMIN (THERAGRAN) TABLET    Take 1 tablet by mouth once daily.    MYCOPHENOLATE (CELLCEPT) 250 MG CAP    Take 2 capsules (500 mg total) by mouth 2 (two) times daily.    ONDANSETRON (ZOFRAN) 4 MG TABLET    Take 1 tablet (4 mg total) by mouth every " "6 (six) hours.    PEN NEEDLE, DIABETIC (BD INSULIN PEN NEEDLE UF SHORT) 31 GAUGE X 5/16" NDLE    USE TO INJECT INSULIN TWICE A DAY    POTASSIUM CHLORIDE SA (K-DUR,KLOR-CON) 20 MEQ TABLET    Take 2 tablets (40 mEq total) by mouth once daily.    PREDNISONE (DELTASONE) 5 MG TABLET    Take 1 tablet (5 mg total) by mouth once daily.    PROMETHAZINE-DEXTROMETHORPHAN (PROMETHAZINE-DM) 6.25-15 MG/5 ML SYRP    Take 5 mLs by mouth every 6 (six) hours as needed (cough).    ROSUVASTATIN (CRESTOR) 40 MG TAB    Take 1 tablet (40 mg total) by mouth once daily in the evening.    SACUBITRIL-VALSARTAN (ENTRESTO)  MG PER TABLET    Take 1 tablet by mouth 2 (two) times daily.    TACROLIMUS (PROGRAF) 1 MG CAP    Take 4 capsules (4 mg total) by mouth every 12 (twelve) hours.    TAMSULOSIN (FLOMAX) 0.4 MG CAP    Take 1 capsule (0.4 mg total) by mouth once daily.    TRIAMCINOLONE ACETONIDE 0.1% (KENALOG) 0.1 % OINTMENT    Apply topically 2 (two) times daily. Use on bilateral lower legs.    VALGANCICLOVIR (VALCYTE) 450 MG TAB    Take 1 tablet (450 mg total) by mouth every other day.     Review of patient's allergies indicates:   Allergen Reactions    Lisinopril Other (See Comments)     Other reaction(s):  cough    Actos  [pioglitazone] Other (See Comments)     Other reaction(s): CHF    Metformin Other (See Comments)     Other reaction(s): renal insuff  Other reaction(s): CHF     Review of Systems   Constitutional: Negative for malaise/fatigue.   HENT:  Negative for hearing loss.    Eyes:  Positive for visual disturbance. Negative for double vision.   Cardiovascular:  Positive for chest pain.   Respiratory:  Positive for sleep disturbances due to breathing. Negative for shortness of breath.    Endocrine: Negative for cold intolerance.   Hematologic/Lymphatic: Does not bruise/bleed easily.   Skin:  Negative for poor wound healing and suspicious lesions.   Musculoskeletal:  Positive for falls and neck pain. Negative for gout, joint pain " and joint swelling.   Gastrointestinal:  Positive for diarrhea. Negative for nausea and vomiting.   Genitourinary:  Positive for frequency, hesitancy, incomplete emptying and nocturia. Negative for dysuria.   Neurological:  Positive for dizziness, numbness, paresthesias and sensory change.   Psychiatric/Behavioral:  Negative for depression, memory loss and substance abuse. The patient is not nervous/anxious.    Allergic/Immunologic: Negative for persistent infections.     Objective:   Body mass index is 43.7 kg/m².  There were no vitals filed for this visit.             General    Constitutional: He is oriented to person, place, and time. He appears well-developed and well-nourished. No distress.   HENT:   Head: Normocephalic.   Mouth/Throat: Oropharynx is clear and moist.   Eyes: EOM are normal.   Cardiovascular:  Normal rate.            Pulmonary/Chest: Effort normal.   Abdominal: Soft.   Neurological: He is alert and oriented to person, place, and time. No cranial nerve deficit.   Psychiatric: He has a normal mood and affect.             Right Hand/Wrist Exam     Inspection   Scars: Wrist - absent Hand -  absent  Effusion: Wrist - present Hand -  present    Pain   Wrist - The patient exhibits pain of the scapholunate/lunate ECU and flexor/pronator group.    Tests   Phalens sign: positive  Tinel's sign (median nerve): positive  Carpal Tunnel Compression Test: positive    Atrophy   Thenar:  negative  Intrinsic:  negative    Other     Neuorologic Exam    Median Distribution: abnormal  Ulnar Distribution: normal  Radial Distribution: normal    Comments:  The right hand still has some residual swelling in the dorsal right hand.  He has a positive Tinel and positive Phalen sign.  There is no thenar atrophy noted.          Vascular Exam       Capillary Refill  Right Hand: normal capillary refill        Relevant imaging results reviewed and interpreted by me, discussed with the patient and / or family today radiographs  right wrist were normal other than vascular clips from previous vascular access  Assessment:     Encounter Diagnosis   Name Primary?    Traumatic hematoma of right hand, initial encounter Yes    Right carpal tunnel syndrome    Plan:   The patient was counseled regarding right carpal tunnel release.  Risk complications and alternatives were discussed including the risk of infection, anesthetic risk, injury to nerves and vessels, loss of motion, and possible need for additional surgeries were discussed.  He seems to understand and agree that surgery.  All questions were answered.  He was given Neurontin 100 mg p.o. t.i.d. prior to surgery              Disclaimer: This note was prepared using a voice recognition system and is likely to have sound alike errors within the text.

## 2023-07-26 ENCOUNTER — HOSPITAL ENCOUNTER (OUTPATIENT)
Dept: CARDIOLOGY | Facility: HOSPITAL | Age: 68
Discharge: HOME | End: 2023-07-26
Attending: INTERNAL MEDICINE
Payer: COMMERCIAL

## 2023-07-26 DIAGNOSIS — I12.9 HYPERTENSION ASSOCIATED WITH STAGE 3 CHRONIC KIDNEY DISEASE DUE TO TYPE 2 DIABETES MELLITUS: ICD-10-CM

## 2023-07-26 DIAGNOSIS — N18.30 HYPERTENSION ASSOCIATED WITH STAGE 3 CHRONIC KIDNEY DISEASE DUE TO TYPE 2 DIABETES MELLITUS: ICD-10-CM

## 2023-07-26 DIAGNOSIS — E11.22 HYPERTENSION ASSOCIATED WITH STAGE 3 CHRONIC KIDNEY DISEASE DUE TO TYPE 2 DIABETES MELLITUS: ICD-10-CM

## 2023-07-26 PROCEDURE — 93010 ELECTROCARDIOGRAM REPORT: CPT | Mod: ,,, | Performed by: INTERNAL MEDICINE

## 2023-07-26 PROCEDURE — 93010 EKG 12-LEAD: ICD-10-PCS | Mod: ,,, | Performed by: INTERNAL MEDICINE

## 2023-07-26 PROCEDURE — 93005 ELECTROCARDIOGRAM TRACING: CPT

## 2023-07-31 ENCOUNTER — HOSPITAL ENCOUNTER (EMERGENCY)
Facility: HOSPITAL | Age: 68
Discharge: HOME | End: 2023-08-01
Attending: EMERGENCY MEDICINE
Payer: COMMERCIAL

## 2023-07-31 DIAGNOSIS — N19 RENAL FAILURE: Primary | ICD-10-CM

## 2023-07-31 DIAGNOSIS — M94.0 COSTOCHONDRITIS: ICD-10-CM

## 2023-07-31 DIAGNOSIS — R07.9 CHEST PAIN: ICD-10-CM

## 2023-07-31 LAB
ALBUMIN SERPL BCP-MCNC: 2.9 G/DL (ref 3.5–5.2)
ALP SERPL-CCNC: 68 U/L (ref 55–135)
ALT SERPL W/O P-5'-P-CCNC: 17 U/L (ref 10–44)
ANION GAP SERPL CALC-SCNC: 12 MMOL/L (ref 8–16)
ANISOCYTOSIS BLD QL SMEAR: SLIGHT
AST SERPL-CCNC: 27 U/L (ref 10–40)
BASOPHILS NFR BLD: 0 % (ref 0–1.9)
BILIRUB SERPL-MCNC: 0.5 MG/DL (ref 0.1–1)
BNP SERPL-MCNC: 111 PG/ML (ref 0–99)
BUN SERPL-MCNC: 42 MG/DL (ref 8–23)
CALCIUM SERPL-MCNC: 9.6 MG/DL (ref 8.7–10.5)
CHLORIDE SERPL-SCNC: 103 MMOL/L (ref 95–110)
CO2 SERPL-SCNC: 25 MMOL/L (ref 23–29)
CREAT SERPL-MCNC: 2.7 MG/DL (ref 0.5–1.4)
DACRYOCYTES BLD QL SMEAR: ABNORMAL
DIFFERENTIAL METHOD: ABNORMAL
EOSINOPHIL NFR BLD: 1 % (ref 0–8)
ERYTHROCYTE [DISTWIDTH] IN BLOOD BY AUTOMATED COUNT: 13.2 % (ref 11.5–14.5)
EST. GFR  (NO RACE VARIABLE): 25 ML/MIN/1.73 M^2
GLUCOSE SERPL-MCNC: 144 MG/DL (ref 70–110)
HCT VFR BLD AUTO: 32.5 % (ref 40–54)
HGB BLD-MCNC: 9.7 G/DL (ref 14–18)
IMM GRANULOCYTES # BLD AUTO: ABNORMAL K/UL (ref 0–0.04)
IMM GRANULOCYTES NFR BLD AUTO: ABNORMAL % (ref 0–0.5)
LYMPHOCYTES NFR BLD: 19 % (ref 18–48)
MCH RBC QN AUTO: 25.3 PG (ref 27–31)
MCHC RBC AUTO-ENTMCNC: 29.8 G/DL (ref 32–36)
MCV RBC AUTO: 85 FL (ref 82–98)
MONOCYTES NFR BLD: 3 % (ref 4–15)
NEUTROPHILS NFR BLD: 77 % (ref 38–73)
NRBC BLD-RTO: 0 /100 WBC
OVALOCYTES BLD QL SMEAR: ABNORMAL
PLATELET # BLD AUTO: 172 K/UL (ref 150–450)
PLATELET BLD QL SMEAR: ABNORMAL
PMV BLD AUTO: 11.8 FL (ref 9.2–12.9)
POIKILOCYTOSIS BLD QL SMEAR: SLIGHT
POLYCHROMASIA BLD QL SMEAR: ABNORMAL
POTASSIUM SERPL-SCNC: 4.9 MMOL/L (ref 3.5–5.1)
PROT SERPL-MCNC: 6.7 G/DL (ref 6–8.4)
RBC # BLD AUTO: 3.84 M/UL (ref 4.6–6.2)
SODIUM SERPL-SCNC: 140 MMOL/L (ref 136–145)
TROPONIN I SERPL DL<=0.01 NG/ML-MCNC: 0.04 NG/ML (ref 0–0.03)
WBC # BLD AUTO: 5.26 K/UL (ref 3.9–12.7)

## 2023-07-31 PROCEDURE — 93005 ELECTROCARDIOGRAM TRACING: CPT

## 2023-07-31 PROCEDURE — 84484 ASSAY OF TROPONIN QUANT: CPT | Performed by: EMERGENCY MEDICINE

## 2023-07-31 PROCEDURE — 85027 COMPLETE CBC AUTOMATED: CPT | Performed by: EMERGENCY MEDICINE

## 2023-07-31 PROCEDURE — 85007 BL SMEAR W/DIFF WBC COUNT: CPT | Performed by: EMERGENCY MEDICINE

## 2023-07-31 PROCEDURE — 83880 ASSAY OF NATRIURETIC PEPTIDE: CPT | Performed by: EMERGENCY MEDICINE

## 2023-07-31 PROCEDURE — 93010 ELECTROCARDIOGRAM REPORT: CPT | Mod: ,,, | Performed by: INTERNAL MEDICINE

## 2023-07-31 PROCEDURE — 25000003 PHARM REV CODE 250: Performed by: EMERGENCY MEDICINE

## 2023-07-31 PROCEDURE — 99285 EMERGENCY DEPT VISIT HI MDM: CPT | Mod: 25

## 2023-07-31 PROCEDURE — 80053 COMPREHEN METABOLIC PANEL: CPT | Performed by: EMERGENCY MEDICINE

## 2023-07-31 PROCEDURE — 93010 EKG 12-LEAD: ICD-10-PCS | Mod: ,,, | Performed by: INTERNAL MEDICINE

## 2023-07-31 RX ORDER — ASPIRIN 325 MG
325 TABLET ORAL
Status: COMPLETED | OUTPATIENT
Start: 2023-07-31 | End: 2023-07-31

## 2023-07-31 RX ADMIN — ASPIRIN 325 MG ORAL TABLET 325 MG: 325 PILL ORAL at 09:07

## 2023-08-01 VITALS
HEART RATE: 97 BPM | BODY MASS INDEX: 42.7 KG/M2 | DIASTOLIC BLOOD PRESSURE: 67 MMHG | HEIGHT: 67 IN | SYSTOLIC BLOOD PRESSURE: 140 MMHG | WEIGHT: 272.06 LBS | TEMPERATURE: 99 F | OXYGEN SATURATION: 99 % | RESPIRATION RATE: 30 BRPM

## 2023-08-01 LAB
INFLUENZA A, MOLECULAR: NEGATIVE
INFLUENZA B, MOLECULAR: NEGATIVE
POCT GLUCOSE: 117 MG/DL (ref 70–110)
SARS-COV-2 RDRP RESP QL NAA+PROBE: NEGATIVE
SPECIMEN SOURCE: NORMAL
TROPONIN I SERPL DL<=0.01 NG/ML-MCNC: 0.04 NG/ML (ref 0–0.03)

## 2023-08-01 PROCEDURE — 93010 EKG 12-LEAD: ICD-10-PCS | Mod: ,,, | Performed by: INTERNAL MEDICINE

## 2023-08-01 PROCEDURE — U0002 COVID-19 LAB TEST NON-CDC: HCPCS | Performed by: EMERGENCY MEDICINE

## 2023-08-01 PROCEDURE — 93010 ELECTROCARDIOGRAM REPORT: CPT | Mod: ,,, | Performed by: INTERNAL MEDICINE

## 2023-08-01 PROCEDURE — 87502 INFLUENZA DNA AMP PROBE: CPT | Performed by: EMERGENCY MEDICINE

## 2023-08-01 PROCEDURE — 82962 GLUCOSE BLOOD TEST: CPT

## 2023-08-01 PROCEDURE — 93005 ELECTROCARDIOGRAM TRACING: CPT

## 2023-08-01 RX ORDER — TRAMADOL HYDROCHLORIDE 50 MG/1
50 TABLET ORAL EVERY 4 HOURS PRN
Qty: 30 TABLET | Refills: 0 | Status: SHIPPED | OUTPATIENT
Start: 2023-08-01

## 2023-08-01 NOTE — ED PROVIDER NOTES
"SCRIBE #1 NOTE: I, Clotilde Meraz, am scribing for, and in the presence of, Aureliano Hernandez Jr., MD. I have scribed the entire note.       History     Chief Complaint   Patient presents with    Chest Pain     Pt to ED tonight via [facility] SS CP x1 wk intermitten with SOB. Bilateral pedal edema noted. Compliant to RX meds     Review of patient's allergies indicates:   Allergen Reactions    Lisinopril Other (See Comments)     Other reaction(s):  cough    Actos  [pioglitazone] Other (See Comments)     Other reaction(s): CHF    Metformin Other (See Comments)     Other reaction(s): renal insuff  Other reaction(s): CHF         History of Present Illness     HPI    7/31/2023, 9:45 PM  History obtained from the patient      History of Present Illness: Mitch Whittaker is a 67 y.o. male patient with a PMHx of CAD, CHF, CKD3, ESRD s/p kidney transplant, DM2, HLD, and HTN who presents to the Emergency Department via EMS for evaluation of left sided non-radiating CP which onset 1 week PTA. The pt reports that he attempted to lift a heavy garbage can last week and heard a "pop". He believes that he pulled a muscle Symptoms are intermittent and moderate in severity. No mitigating or exacerbating factors reported. Associated sxs include SOB (intermittent), LUE myalgia, left hand swelling. Patient denies any back pain, abdominal pain, fever, N/V, numbness, and all other sxs at this time. Prior Tx includes Aspirin with no relief. No further complaints or concerns at this time.       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 RNA NEGATIVE 5/2015, 2021, 2022    History of colon polyps     HPTH (hyperparathyroidism)     Hyperlipidemia     Hypertension associated with stage 3 chronic kidney disease due to type 2 diabetes mellitus     LBBB (left bundle branch block) 12/20/2021    Morbid obesity with BMI of 45.0-49.9, adult     Nephrolithiasis 6/7/2013    PCO (posterior capsular opacification), left 03/04/2019    Proteinuria     resolved s/p transplant    S/P kidney transplant     Sleep apnea     Type 2 diabetes, uncontrolled, with retinopathy     Type II diabetes mellitus with renal manifestations        Past Surgical History:  Past Surgical History:   Procedure Laterality Date    CARDIAC CATHETERIZATION  2008    normal coronary    COLONOSCOPY N/A 4/5/2018    Procedure: COLONOSCOPY;  Surgeon: Chava Ronquillo MD;  Location: [facility];  Service: Endoscopy;  Laterality: N/A;    COLONOSCOPY N/A 5/2/2022    Procedure: COLONOSCOPY;  Surgeon: Alix Puente MD;  Location: [facility];  Service: Endoscopy;  Laterality: N/A;    COLONOSCOPY N/A 6/7/2023    Procedure: COLONOSCOPY - rule out CMV  Cardiac clearance/Eliquis hold approval received on 05/21/23 per Dr. Meade, cardiology.  Note in encounters.  LB;  Surgeon: Daniella Shah MD;  Location: [facility];  Service: Endoscopy;  Laterality: N/A;    KIDNEY TRANSPLANT      RETINAL LASER PROCEDURE           Family History:  Family History   Problem Relation Age of Onset    Diabetes Mother     Hypertension Mother     Heart failure Mother     Kidney disease Sister         ESRD    Diabetes Sister     Diabetes  Maternal Grandmother     Cancer Neg Hx        Social History:  Social History     Tobacco Use    Smoking status: Former     Current packs/day: 0.00     Types: Cigarettes     Quit date: 5/25/2013     Years since quitting: 10.1     Passive exposure: Past    Smokeless tobacco: Former     Quit date: 6/11/2013    Tobacco comments:     used marijuana since 2431-6636, stopped after started dialysis   Substance and Sexual Activity    Alcohol use: No     Alcohol/week: 0.0 standard drinks of alcohol    Drug use: No     Comment:      Sexual activity: Never        Review of Systems     Review of Systems   Constitutional:  Negative for fever.   HENT:  Negative for sore throat.    Respiratory:  Positive for shortness of breath (intermittent).    Cardiovascular:  Positive for chest pain (intermittent, left sided).   Gastrointestinal:  Negative for abdominal pain, nausea and vomiting.   Genitourinary:  Negative for dysuria.   Musculoskeletal:  Positive for joint swelling (left hand) and myalgias (LUE). Negative for back pain.   Skin:  Negative for rash.   Neurological:  Negative for weakness and numbness.   Hematological:  Does not bruise/bleed easily.   All other systems reviewed and are negative.       Physical Exam     Initial Vitals [07/31/23 2002]   BP Pulse Resp Temp SpO2   107/66 103 (!) 24 98.8 °F (37.1 °C) 98 %      MAP       --          Physical Exam  Nursing Notes and Vital Signs Reviewed.  Constitutional: Patient is in no acute distress. Well-developed and well-nourished.  Head: Atraumatic. Normocephalic.  Eyes: PERRL. EOM intact. Conjunctivae are not pale. No scleral icterus.  ENT: Mucous membranes are moist. Oropharynx is clear and symmetric.    Neck: Supple. Full ROM. No lymphadenopathy.  Cardiovascular: Regular rate. Regular rhythm. No murmurs, rubs, or gallops. Distal pulses are 2+ and symmetric.  Pulmonary/Chest: No respiratory distress. Clear to auscultation bilaterally. No wheezing or rales. Left anterior chest  "wall tenderness, which reproduces pt's chief complaint  Abdominal: Soft and non-distended.  There is no tenderness.  No rebound, guarding, or rigidity. Good bowel sounds.  Genitourinary: No CVA tenderness  Musculoskeletal: Moves all extremities. No obvious deformities. No edema. No calf tenderness. Left shoulder/deltoid muscle tenderness  Skin: Warm and dry.  Neurological:  Alert, awake, and appropriate.  Normal speech.  No acute focal neurological deficits are appreciated.  Psychiatric: Normal affect. Good eye contact. Appropriate in content.     ED Course   Procedures  ED Vital Signs:  Vitals:    07/31/23 2002 07/31/23 2054 07/31/23 2100 07/31/23 2123   BP: 107/66 (!) 151/77 (!) 142/70    Pulse: 103 97 94 92   Resp: (!) 24 17 (!) 31    Temp: 98.8 °F (37.1 °C)      TempSrc: Oral      SpO2: 98% 95% 99%    Weight:  123.4 kg (272 lb 0.8 oz)     Height: 5' 7" (1.702 m)       07/31/23 2130 07/31/23 2200 07/31/23 2230 07/31/23 2330   BP: (!) 147/75 (!) 148/73 (!) 149/78 (!) 179/83   Pulse: 100 97 93 97   Resp:  (!) 26 (!) 27 (!) 21   Temp:       TempSrc:       SpO2: 100% 100% 100% 96%   Weight:       Height:        08/01/23 0000 08/01/23 0100 08/01/23 0200 08/01/23 0255   BP: (!) 169/82 (!) 152/75 (!) 144/52 (!) 140/67   Pulse: 91 91 98 97   Resp: (!) 23 (!) 24 (!) 26 (!) 30   Temp:    98.6 °F (37 °C)   TempSrc:    Oral   SpO2: 99% 99% 100% 99%   Weight:       Height:           Abnormal Lab Results:  Labs Reviewed   CBC W/ AUTO DIFFERENTIAL - Abnormal; Notable for the following components:       Result Value    RBC 3.84 (*)     Hemoglobin 9.7 (*)     Hematocrit 32.5 (*)     MCH 25.3 (*)     MCHC 29.8 (*)     Gran % 77.0 (*)     Mono % 3.0 (*)     All other components within normal limits   COMPREHENSIVE METABOLIC PANEL - Abnormal; Notable for the following components:    Glucose 144 (*)     BUN 42 (*)     Creatinine 2.7 (*)     Albumin 2.9 (*)     eGFR 25 (*)     All other components within normal limits   TROPONIN I - " Abnormal; Notable for the following components:    Troponin I 0.039 (*)     All other components within normal limits   B-TYPE NATRIURETIC PEPTIDE - Abnormal; Notable for the following components:     (*)     All other components within normal limits   TROPONIN I - Abnormal; Notable for the following components:    Troponin I 0.036 (*)     All other components within normal limits   POCT GLUCOSE - Abnormal; Notable for the following components:    POCT Glucose 117 (*)     All other components within normal limits   INFLUENZA A & B BY MOLECULAR   SARS-COV-2 RNA AMPLIFICATION, QUAL        All Lab Results:  Results for orders placed or performed during the hospital encounter of 07/31/23   Influenza A & B by Molecular    Specimen: Nasopharyngeal Swab   Result Value Ref Range    Influenza A, Molecular Negative Negative    Influenza B, Molecular Negative Negative    Flu A & B Source Nasal swab    CBC auto differential   Result Value Ref Range    WBC 5.26 3.90 - 12.70 K/uL    RBC 3.84 (L) 4.60 - 6.20 M/uL    Hemoglobin 9.7 (L) 14.0 - 18.0 g/dL    Hematocrit 32.5 (L) 40.0 - 54.0 %    MCV 85 82 - 98 fL    MCH 25.3 (L) 27.0 - 31.0 pg    MCHC 29.8 (L) 32.0 - 36.0 g/dL    RDW 13.2 11.5 - 14.5 %    Platelets 172 150 - 450 K/uL    MPV 11.8 9.2 - 12.9 fL    Immature Granulocytes CANCELED 0.0 - 0.5 %    Immature Grans (Abs) CANCELED 0.00 - 0.04 K/uL    nRBC 0 0 /100 WBC    Gran % 77.0 (H) 38.0 - 73.0 %    Lymph % 19.0 18.0 - 48.0 %    Mono % 3.0 (L) 4.0 - 15.0 %    Eosinophil % 1.0 0.0 - 8.0 %    Basophil % 0.0 0.0 - 1.9 %    Platelet Estimate Appears normal     Aniso Slight     Poik Slight     Poly Occasional     Ovalocytes Occasional     Tear Drop Cells Occasional     Differential Method Manual    Comprehensive metabolic panel   Result Value Ref Range    Sodium 140 136 - 145 mmol/L    Potassium 4.9 3.5 - 5.1 mmol/L    Chloride 103 95 - 110 mmol/L    CO2 25 23 - 29 mmol/L    Glucose 144 (H) 70 - 110 mg/dL    BUN 42 (H) 8 -  23 mg/dL    Creatinine 2.7 (H) 0.5 - 1.4 mg/dL    Calcium 9.6 8.7 - 10.5 mg/dL    Total Protein 6.7 6.0 - 8.4 g/dL    Albumin 2.9 (L) 3.5 - 5.2 g/dL    Total Bilirubin 0.5 0.1 - 1.0 mg/dL    Alkaline Phosphatase 68 55 - 135 U/L    AST 27 10 - 40 U/L    ALT 17 10 - 44 U/L    eGFR 25 (A) >60 mL/min/1.73 m^2    Anion Gap 12 8 - 16 mmol/L   Troponin I #1   Result Value Ref Range    Troponin I 0.039 (H) 0.000 - 0.026 ng/mL   BNP   Result Value Ref Range     (H) 0 - 99 pg/mL   Troponin I #2   Result Value Ref Range    Troponin I 0.036 (H) 0.000 - 0.026 ng/mL   COVID-19 Rapid Screening   Result Value Ref Range    SARS-CoV-2 RNA, Amplification, Qual Negative Negative   POCT glucose   Result Value Ref Range    POCT Glucose 117 (H) 70 - 110 mg/dL     *Note: Due to a large number of results and/or encounters for the requested time period, some results have not been displayed. A complete set of results can be found in Results Review.         Imaging Results:  Imaging Results              X-Ray Chest AP Portable (Final result)  Result time 07/31/23 21:45:42      Final result by Daniele Whatley MD (07/31/23 21:45:42)                   Impression:      As above      Electronically signed by: Daniele Whatley  Date:    07/31/2023  Time:    21:45               Narrative:    EXAMINATION:  XR CHEST AP PORTABLE    CLINICAL HISTORY:  Chest Pain;    TECHNIQUE:  Single frontal view of the chest was performed.    COMPARISON:  None    FINDINGS:  Cardiomegaly.  Low lung volumes.  Mild central pulmonary vascular crowding.  Otherwise no acute process seen.    Bones are intact.                                       The EKG was ordered, reviewed, and independently interpreted by the ED provider.  Interpretation time: 21:28  Rate: 94 BPM  Rhythm: normal sinus rhythm  Interpretation: ST & T wave abnormality, consider lateral ischemia. No STEMI.    The EKG was ordered, reviewed, and independently interpreted by the ED provider.  Interpretation  time: 00:21  Rate: 98 BPM  Rhythm: normal sinus rhythm  Interpretation: Nonspecific T wave abnormality. Prolonged QT. No STEMI.             The Emergency Provider reviewed the vital signs and test results, which are outlined above.     ED Discussion     2:41 AM: Reassessed pt at this time.  Discussed with pt all pertinent ED information and results. Discussed pt dx and plan of tx. Gave pt all f/u and return to the ED instructions. All questions and concerns were addressed at this time. Pt expresses understanding of information and instructions, and is comfortable with plan to discharge. Pt is stable for discharge.    I discussed with patient and/or family/caretaker that evaluation in the ED does not suggest any emergent or life threatening medical conditions requiring immediate intervention beyond what was provided in the ED, and I believe patient is safe for discharge.  Regardless, an unremarkable evaluation in the ED does not preclude the development or presence of a serious of life threatening condition. As such, patient was instructed to return immediately for any worsening or change in current symptoms.    Driving or other activities under influence of medications - Patient and/or family/caretaker was given a prescription for, or instructed to use a medicine that may impair ability to drive, operate machinery, or participate in other potentially dangerous activities.  Patient was instructed not to participate in these activities while under the influence of these medications.    Regarding CHEST PAIN, I advised the patient that chest pain can be caused by a range of conditions, from not serious to life-threatening such as: heart attack or a blood clot in your lungs, angina indicating poor blood flow to the heart; infection, inflammation, or a fracture in the bones or cartilage in chest wall; a digestive problem, such as acid reflux or a stomach ulcer; or even an anxiety attack.  Instructed patient to follow up  with primary healthcare provider for reevaluation and possible diagnostic studies to find the actual cause of the chest pain. Patient was instructed to call 911 or go to the nearest emergency department if they develop any of the following signs of a heart attack: squeezing, pressure, or pain in the chest that lasts longer than five minutes or returns; discomfort or pain in the back, neck, jaw, stomach, or arm; trouble breathing; nausea or vomiting; lightheadedness;  or a sudden cold sweat, especially with chest pain or trouble breathing.  Also return to the emergency department the chest discomfort gets worse (even with medicine); cough or vomit blood; have bowel movements that are black or bloody; cannot stop vomiting; or develop difficulty swallowing.    Driving or other activities under influence of medications - Patient and/or family/caretaker was given a prescription for, or instructed to use a medicine that may impair ability to drive, operate machinery, or participate in other potentially dangerous activities.  Patient was instructed not to participate in these activities while under the influence of these medications.       Medical Decision Making:   Clinical Tests:   Lab Tests: Ordered and Reviewed  Radiological Study: Ordered and Reviewed  Medical Tests: Ordered and Reviewed           ED Medication(s):  Medications   aspirin tablet 325 mg (325 mg Oral Given 7/31/23 2138)       Discharge Medication List as of 8/1/2023  2:45 AM        START taking these medications    Details   traMADoL (ULTRAM) 50 mg tablet Take 1 tablet (50 mg total) by mouth every 4 (four) hours as needed for Pain., Starting Tue 8/1/2023, Print              Follow-up Information       Alix Kowalski DO. Schedule an appointment as soon as possible for a visit in 1 week.    Specialty: Internal Medicine  Contact information:  [address] [place] [address] [address] Marlena HERNANDEZ [address] [phone]               [facility]. Schedule an  appointment as soon as possible for a visit in 1 week.    Specialty: Cardiology  Contact information:  [address] [facility] [address] [phone]  Additional information:  Please park on the [address] side and use the Clinic entrance. Check in on the 3rd floor, to the right.             [facility] - Emergency Dept..    Specialty: Emergency Medicine  Why: As needed, If symptoms worsen  Contact information:  [address]  [facility] [address] [phone]                               Scribe Attestation:   Scribe #1: I performed the above scribed service and the documentation accurately describes the services I performed. I attest to the accuracy of the note.     Attending:   Physician Attestation Statement for Scribe #1: I, Aureliano Hernandez Jr., MD, personally performed the services described in this documentation, as scribed by Clotilde Meraz, in my presence, and it is both accurate and complete.           Clinical Impression       ICD-10-CM ICD-9-CM   1. Renal failure  N19 586   2. Chest pain  R07.9 786.50   3. Costochondritis  M94.0 733.6       Disposition:   Disposition: Discharged  Condition: Stable         Aureliano Hernandez Jr., MD  08/02/23 0542

## 2023-08-02 RX ORDER — FUROSEMIDE 80 MG/1
TABLET ORAL
Qty: 30 TABLET | Refills: 11 | Status: SHIPPED | OUTPATIENT
Start: 2023-08-02

## 2023-08-09 RX ORDER — LANOLIN ALCOHOL/MO/W.PET/CERES
400 CREAM (GRAM) TOPICAL DAILY
Qty: 90 TABLET | Refills: 2 | Status: SHIPPED | OUTPATIENT
Start: 2023-08-09

## 2023-08-09 NOTE — TELEPHONE ENCOUNTER
Refill Routing Note     Refill Routing Note   Medication(s) are not appropriate for processing by [facility] for the following reason(s):      Medication outside of protocol    [facility] action(s):  Route Care Due:  None identified            Appointments  past 12m or future 3m with PCP    Date Provider   Last Visit   3/28/2023 Alix Kowalski DO   Next Visit   Visit date not found Alix Kowalski DO   ED visits in past 90 days: 1        Note composed:8:43 AM 08/09/2023

## 2023-08-15 ENCOUNTER — TELEPHONE (OUTPATIENT)
Dept: PREADMISSION TESTING | Facility: HOSPITAL | Age: 68
End: 2023-08-15
Payer: COMMERCIAL

## 2023-08-15 ENCOUNTER — PATIENT MESSAGE (OUTPATIENT)
Dept: CARDIOLOGY | Facility: CLINIC | Age: 68
End: 2023-08-15
Payer: COMMERCIAL

## 2023-08-15 NOTE — TELEPHONE ENCOUNTER
----- Message from Hany Gonsalez MD sent at 8/15/2023  2:52 PM CDT -----  Regarding: RE: surgery dept  OK from renal view. Check K on day of surgery. Consult renal after pt gets admitted to hospital.  ----- Message -----  From: Nu Flores RN  Sent: 8/15/2023   1:24 PM CDT  To: Hany Gonsalez MD  Subject: surgery dept                                     Hello,   This pt will be having carpal tunnel surgery on 8/25/23 with Dr Almonte. We have reviewed the patient's medical history and are requesting a Transplant clearance note. Please advise on whether this patient can be cleared to proceed from your standpoint or will need any further testing to ensure they are optimized to proceed.       -Thanks in advance,  [facility] Pre [facility].  [phone] or [phone]  Mon-Fri 7:30 am- 4 pm (Closed Major Holidays)

## 2023-08-22 ENCOUNTER — HOSPITAL ENCOUNTER (OUTPATIENT)
Dept: CARDIOLOGY | Facility: HOSPITAL | Age: 68
Discharge: HOME | End: 2023-08-22
Payer: COMMERCIAL

## 2023-08-22 ENCOUNTER — TELEPHONE (OUTPATIENT)
Dept: CARDIOLOGY | Facility: CLINIC | Age: 68
End: 2023-08-22

## 2023-08-22 ENCOUNTER — HOSPITAL ENCOUNTER (OUTPATIENT)
Dept: CARDIOLOGY | Facility: HOSPITAL | Age: 68
Discharge: HOME | End: 2023-08-22
Attending: PHYSICIAN ASSISTANT
Payer: COMMERCIAL

## 2023-08-22 ENCOUNTER — HOSPITAL ENCOUNTER (OUTPATIENT)
Dept: RADIOLOGY | Facility: HOSPITAL | Age: 68
Discharge: HOME | End: 2023-08-22
Attending: PHYSICIAN ASSISTANT
Payer: COMMERCIAL

## 2023-08-22 ENCOUNTER — OFFICE VISIT (OUTPATIENT)
Dept: CARDIOLOGY | Facility: CLINIC | Age: 68
End: 2023-08-22
Payer: COMMERCIAL

## 2023-08-22 VITALS
OXYGEN SATURATION: 97 % | WEIGHT: 277.75 LBS | HEIGHT: 67 IN | BODY MASS INDEX: 43.6 KG/M2 | SYSTOLIC BLOOD PRESSURE: 126 MMHG | HEART RATE: 115 BPM | DIASTOLIC BLOOD PRESSURE: 64 MMHG

## 2023-08-22 VITALS — HEIGHT: 67 IN | WEIGHT: 277 LBS | BODY MASS INDEX: 43.47 KG/M2

## 2023-08-22 DIAGNOSIS — E11.8 DM (DIABETES MELLITUS), TYPE 2 WITH COMPLICATIONS: ICD-10-CM

## 2023-08-22 DIAGNOSIS — Z01.818 PRE-OP EVALUATION: Primary | ICD-10-CM

## 2023-08-22 DIAGNOSIS — I87.2 CHRONIC VENOUS INSUFFICIENCY OF LOWER EXTREMITY: ICD-10-CM

## 2023-08-22 DIAGNOSIS — I82.492 ACUTE DEEP VEIN THROMBOSIS (DVT) OF OTHER SPECIFIED VEIN OF LEFT LOWER EXTREMITY: ICD-10-CM

## 2023-08-22 DIAGNOSIS — I25.118 CORONARY ARTERY DISEASE OF NATIVE ARTERY OF NATIVE HEART WITH STABLE ANGINA PECTORIS: ICD-10-CM

## 2023-08-22 DIAGNOSIS — I50.42 CHRONIC COMBINED SYSTOLIC AND DIASTOLIC CONGESTIVE HEART FAILURE: ICD-10-CM

## 2023-08-22 DIAGNOSIS — I44.7 LBBB (LEFT BUNDLE BRANCH BLOCK): ICD-10-CM

## 2023-08-22 DIAGNOSIS — Z01.818 PRE-OP EVALUATION: ICD-10-CM

## 2023-08-22 DIAGNOSIS — Z79.01 CHRONIC ANTICOAGULATION: ICD-10-CM

## 2023-08-22 DIAGNOSIS — E66.01 MORBID OBESITY WITH BMI OF 40.0-44.9, ADULT: ICD-10-CM

## 2023-08-22 DIAGNOSIS — Z94.0 KIDNEY TRANSPLANT STATUS, LIVING RELATED DONOR: Chronic | ICD-10-CM

## 2023-08-22 DIAGNOSIS — E66.01 SEVERE OBESITY (BMI >= 40): ICD-10-CM

## 2023-08-22 DIAGNOSIS — G47.33 OBSTRUCTIVE SLEEP APNEA: ICD-10-CM

## 2023-08-22 DIAGNOSIS — I50.42 CHRONIC COMBINED SYSTOLIC AND DIASTOLIC CONGESTIVE HEART FAILURE: Primary | ICD-10-CM

## 2023-08-22 LAB
AORTIC ROOT ANNULUS: 3.57 CM
ASCENDING AORTA: 3.55 CM
AV INDEX (PROSTH): 0.73
AV MEAN GRADIENT: 2 MMHG
AV PEAK GRADIENT: 4 MMHG
AV VALVE AREA BY VELOCITY RATIO: 2.71 CM²
AV VALVE AREA: 2.85 CM²
AV VELOCITY RATIO: 0.7
BSA FOR ECHO PROCEDURE: 2.44 M2
CV ECHO LV RWT: 0.54 CM
DOP CALC AO PEAK VEL: 1.05 M/S
DOP CALC AO VTI: 19.7 CM
DOP CALC LVOT AREA: 3.9 CM2
DOP CALC LVOT DIAMETER: 2.23 CM
DOP CALC LVOT PEAK VEL: 0.73 M/S
DOP CALC LVOT STROKE VOLUME: 56.21 CM3
DOP CALC RVOT PEAK VEL: 0.98 M/S
DOP CALC RVOT VTI: 19.1 CM
DOP CALCLVOT PEAK VEL VTI: 14.4 CM
E WAVE DECELERATION TIME: 171.36 MSEC
E/A RATIO: 0.7
ECHO LV POSTERIOR WALL: 1.42 CM (ref 0.6–1.1)
FRACTIONAL SHORTENING: 23 % (ref 28–44)
INTERVENTRICULAR SEPTUM: 1.57 CM (ref 0.6–1.1)
IVRT: 134.16 MSEC
LA MAJOR: 6.08 CM
LA MINOR: 6.48 CM
LA WIDTH: 3.9 CM
LEFT ATRIUM SIZE: 5.57 CM
LEFT ATRIUM VOLUME INDEX: 49.9 ML/M2
LEFT ATRIUM VOLUME: 115.84 CM3
LEFT INTERNAL DIMENSION IN SYSTOLE: 4.04 CM (ref 2.1–4)
LEFT VENTRICLE DIASTOLIC VOLUME INDEX: 57.1 ML/M2
LEFT VENTRICLE DIASTOLIC VOLUME: 132.48 ML
LEFT VENTRICLE MASS INDEX: 149 G/M2
LEFT VENTRICLE SYSTOLIC VOLUME INDEX: 31 ML/M2
LEFT VENTRICLE SYSTOLIC VOLUME: 71.87 ML
LEFT VENTRICULAR INTERNAL DIMENSION IN DIASTOLE: 5.25 CM (ref 3.5–6)
LEFT VENTRICULAR MASS: 345.69 G
LVOT MG: 1.23 MMHG
LVOT MV: 0.51 CM/S
MV PEAK A VEL: 1.22 M/S
MV PEAK E VEL: 0.85 M/S
MV STENOSIS PRESSURE HALF TIME: 49.7 MS
MV VALVE AREA P 1/2 METHOD: 4.43 CM2
PV MEAN GRADIENT: 2 MMHG
PV MV: 0.76 M/S
PV PEAK GRADIENT: 5 MMHG
PV PEAK VELOCITY: 1.08 M/S
RA PRESSURE ESTIMATED: 3 MMHG
SINUS: 3.95 CM
STJ: 4.06 CM
Z-SCORE OF LEFT VENTRICULAR DIMENSION IN END DIASTOLE: -5.67
Z-SCORE OF LEFT VENTRICULAR DIMENSION IN END SYSTOLE: -2.55

## 2023-08-22 PROCEDURE — 3061F NEG MICROALBUMINURIA REV: CPT | Mod: CPTII,S$GLB,, | Performed by: PHYSICIAN ASSISTANT

## 2023-08-22 PROCEDURE — 3008F PR BODY MASS INDEX (BMI) DOCUMENTED: ICD-10-PCS | Mod: CPTII,S$GLB,, | Performed by: PHYSICIAN ASSISTANT

## 2023-08-22 PROCEDURE — 3078F DIAST BP <80 MM HG: CPT | Mod: CPTII,S$GLB,, | Performed by: PHYSICIAN ASSISTANT

## 2023-08-22 PROCEDURE — 93005 ELECTROCARDIOGRAM TRACING: CPT

## 2023-08-22 PROCEDURE — 99999 PR PBB SHADOW E&M-EST. PATIENT-LVL V: CPT | Mod: PBBFAC,,, | Performed by: PHYSICIAN ASSISTANT

## 2023-08-22 PROCEDURE — 3288F PR FALLS RISK ASSESSMENT DOCUMENTED: ICD-10-PCS | Mod: CPTII,S$GLB,, | Performed by: PHYSICIAN ASSISTANT

## 2023-08-22 PROCEDURE — 71046 X-RAY EXAM CHEST 2 VIEWS: CPT | Mod: TC

## 2023-08-22 PROCEDURE — 99214 PR OFFICE/OUTPT VISIT, EST, LEVL IV, 30-39 MIN: ICD-10-PCS | Mod: S$GLB,,, | Performed by: PHYSICIAN ASSISTANT

## 2023-08-22 PROCEDURE — 1159F MED LIST DOCD IN RCRD: CPT | Mod: CPTII,S$GLB,, | Performed by: PHYSICIAN ASSISTANT

## 2023-08-22 PROCEDURE — 93010 ELECTROCARDIOGRAM REPORT: CPT | Mod: ,,, | Performed by: INTERNAL MEDICINE

## 2023-08-22 PROCEDURE — 3074F PR MOST RECENT SYSTOLIC BLOOD PRESSURE < 130 MM HG: ICD-10-PCS | Mod: CPTII,S$GLB,, | Performed by: PHYSICIAN ASSISTANT

## 2023-08-22 PROCEDURE — 1125F AMNT PAIN NOTED PAIN PRSNT: CPT | Mod: CPTII,S$GLB,, | Performed by: PHYSICIAN ASSISTANT

## 2023-08-22 PROCEDURE — 71046 X-RAY EXAM CHEST 2 VIEWS: CPT | Mod: 26,,, | Performed by: RADIOLOGY

## 2023-08-22 PROCEDURE — 99214 OFFICE O/P EST MOD 30 MIN: CPT | Mod: S$GLB,,, | Performed by: PHYSICIAN ASSISTANT

## 2023-08-22 PROCEDURE — 3044F PR MOST RECENT HEMOGLOBIN A1C LEVEL <7.0%: ICD-10-PCS | Mod: CPTII,S$GLB,, | Performed by: PHYSICIAN ASSISTANT

## 2023-08-22 PROCEDURE — 3066F PR DOCUMENTATION OF TREATMENT FOR NEPHROPATHY: ICD-10-PCS | Mod: CPTII,S$GLB,, | Performed by: PHYSICIAN ASSISTANT

## 2023-08-22 PROCEDURE — 93306 TTE W/DOPPLER COMPLETE: CPT | Mod: 26,,, | Performed by: INTERNAL MEDICINE

## 2023-08-22 PROCEDURE — 3061F PR NEG MICROALBUMINURIA RESULT DOCUMENTED/REVIEW: ICD-10-PCS | Mod: CPTII,S$GLB,, | Performed by: PHYSICIAN ASSISTANT

## 2023-08-22 PROCEDURE — 3288F FALL RISK ASSESSMENT DOCD: CPT | Mod: CPTII,S$GLB,, | Performed by: PHYSICIAN ASSISTANT

## 2023-08-22 PROCEDURE — 3078F PR MOST RECENT DIASTOLIC BLOOD PRESSURE < 80 MM HG: ICD-10-PCS | Mod: CPTII,S$GLB,, | Performed by: PHYSICIAN ASSISTANT

## 2023-08-22 PROCEDURE — 1159F PR MEDICATION LIST DOCUMENTED IN MEDICAL RECORD: ICD-10-PCS | Mod: CPTII,S$GLB,, | Performed by: PHYSICIAN ASSISTANT

## 2023-08-22 PROCEDURE — 71046 XR CHEST PA AND LATERAL: ICD-10-PCS | Mod: 26,,, | Performed by: RADIOLOGY

## 2023-08-22 PROCEDURE — 3066F NEPHROPATHY DOC TX: CPT | Mod: CPTII,S$GLB,, | Performed by: PHYSICIAN ASSISTANT

## 2023-08-22 PROCEDURE — 93010 EKG 12-LEAD: ICD-10-PCS | Mod: ,,, | Performed by: INTERNAL MEDICINE

## 2023-08-22 PROCEDURE — 93306 TTE W/DOPPLER COMPLETE: CPT

## 2023-08-22 PROCEDURE — 99999 PR PBB SHADOW E&M-EST. PATIENT-LVL V: ICD-10-PCS | Mod: PBBFAC,,, | Performed by: PHYSICIAN ASSISTANT

## 2023-08-22 PROCEDURE — 4010F ACE/ARB THERAPY RXD/TAKEN: CPT | Mod: CPTII,S$GLB,, | Performed by: PHYSICIAN ASSISTANT

## 2023-08-22 PROCEDURE — 93306 ECHO (CUPID ONLY): ICD-10-PCS | Mod: 26,,, | Performed by: INTERNAL MEDICINE

## 2023-08-22 PROCEDURE — 4010F PR ACE/ARB THEARPY RXD/TAKEN: ICD-10-PCS | Mod: CPTII,S$GLB,, | Performed by: PHYSICIAN ASSISTANT

## 2023-08-22 PROCEDURE — 3008F BODY MASS INDEX DOCD: CPT | Mod: CPTII,S$GLB,, | Performed by: PHYSICIAN ASSISTANT

## 2023-08-22 PROCEDURE — 1125F PR PAIN SEVERITY QUANTIFIED, PAIN PRESENT: ICD-10-PCS | Mod: CPTII,S$GLB,, | Performed by: PHYSICIAN ASSISTANT

## 2023-08-22 PROCEDURE — 3074F SYST BP LT 130 MM HG: CPT | Mod: CPTII,S$GLB,, | Performed by: PHYSICIAN ASSISTANT

## 2023-08-22 PROCEDURE — 1101F PR PT FALLS ASSESS DOC 0-1 FALLS W/OUT INJ PAST YR: ICD-10-PCS | Mod: CPTII,S$GLB,, | Performed by: PHYSICIAN ASSISTANT

## 2023-08-22 PROCEDURE — 3044F HG A1C LEVEL LT 7.0%: CPT | Mod: CPTII,S$GLB,, | Performed by: PHYSICIAN ASSISTANT

## 2023-08-22 PROCEDURE — 1101F PT FALLS ASSESS-DOCD LE1/YR: CPT | Mod: CPTII,S$GLB,, | Performed by: PHYSICIAN ASSISTANT

## 2023-08-22 NOTE — PROGRESS NOTES
Subjective:   Patient ID:  Mitch Whittaker is a 67 y.o. male who presents for follow-up of pre-op evaluation      HPI  Mr. Whittaker is a 67 year old male patient whose current medical conditions include combined CHF (EF 40-45%), CKD, DM type II, MARION, HTN, hyperlipidemia, LBBB, DVT (on Eliquis) ESRD s/p renal transplant who presents today for pre-op evaluation. Patient scheduled to undergo CTR surgery by Dr. Almonte in near future. He returns today and states he is doing ok. Admits to SOB, chronic but feels like it has been getting worse, especially with exertion. He also reports increased BLE edema, states he was unable to get his shoes on. Endorses chronic orthopnea, sleeps in a recliner. No PND. Weight is up about 10 lbs since his last visit with Dr. Muhammad. No LH, dizziness, palpitations, near syncope, or syncope. BP stable and controlled. Patient is compliant with his medications, no longer taking metolazone. Followed routinely by nephrology. Remains on Eliquis. No bleeding issues.    Of note, patient had ED visit at end of July due to edema/reported CP symptoms. He stated he had muscle spasms that radiated to his arm and upper chest area. Troponin was mildly elevated but flat.    EKG today shows sinus tachycardia with PVC's, chronic LBBB. Echo 2/22 showed EF of 40%.      Review of Systems   Constitutional: Negative for chills, decreased appetite, fever and malaise/fatigue.   HENT:  Negative for congestion, hoarse voice and sore throat.    Eyes:  Negative for blurred vision and discharge.   Cardiovascular:  Positive for dyspnea on exertion, leg swelling and orthopnea. Negative for chest pain, claudication, cyanosis, irregular heartbeat, near-syncope, palpitations and paroxysmal nocturnal dyspnea.   Respiratory:  Positive for cough and shortness of breath. Negative for hemoptysis, snoring, sputum production and wheezing.    Endocrine: Negative for cold intolerance and heat intolerance.   Hematologic/Lymphatic:  "Negative for bleeding problem. Does not bruise/bleed easily.   Skin:  Negative for rash.   Musculoskeletal:  Negative for arthritis, back pain, joint pain, joint swelling, muscle cramps, muscle weakness and myalgias.   Gastrointestinal:  Negative for abdominal pain, constipation, diarrhea, heartburn, melena and nausea.   Genitourinary:  Negative for hematuria.   Neurological:  Negative for dizziness, focal weakness, headaches, light-headedness, loss of balance, numbness, paresthesias and seizures.   Psychiatric/Behavioral:  Negative for memory loss. The patient does not have insomnia.    Allergic/Immunologic: Negative for hives.     /64 (BP Location: Left arm, Patient Position: Sitting, BP Method: Large (Manual))   Pulse (!) 115   Ht 5' 7" (1.702 m)   Wt 126 kg (277 lb 12.5 oz)   SpO2 97%   BMI 43.51 kg/m²     /64 (BP Location: Left arm, Patient Position: Sitting, BP Method: Large (Manual))   Pulse (!) 115   Ht 5' 7" (1.702 m)   Wt 126 kg (277 lb 12.5 oz)   SpO2 97%   BMI 43.51 kg/m²     Objective:   Physical Exam  Vitals and nursing note reviewed.   Constitutional:       General: He is not in acute distress.     Appearance: Normal appearance. He is well-developed. He is obese. He is not diaphoretic.   HENT:      Head: Normocephalic and atraumatic.   Eyes:      General:         Right eye: No discharge.         Left eye: No discharge.      Pupils: Pupils are equal, round, and reactive to light.   Neck:      Thyroid: No thyromegaly.      Vascular: No JVD.      Trachea: No tracheal deviation.   Cardiovascular:      Rate and Rhythm: Regular rhythm. Tachycardia present.      Heart sounds: Normal heart sounds, S1 normal and S2 normal. No murmur heard.  Pulmonary:      Effort: Pulmonary effort is normal. No respiratory distress.      Breath sounds: No wheezing.      Comments: Diminished BS at bases  Abdominal:      General: There is no distension.      Palpations: Abdomen is soft.      Tenderness: " There is no rebound.   Musculoskeletal:      Cervical back: Neck supple.      Right lower leg: Edema present.      Left lower leg: Edema present.      Comments: Edema extending up to hips/thigs   Skin:     General: Skin is warm and dry.      Findings: No erythema.   Neurological:      General: No focal deficit present.      Mental Status: He is alert and oriented to person, place, and time.   Psychiatric:         Mood and Affect: Mood normal.         Behavior: Behavior normal.         Thought Content: Thought content normal.       Echo Results 2/22  Summary    Concentric hypertrophy and mildly decreased systolic function.  Mild tricuspid regurgitation.  Mild left atrial enlargement.  The estimated ejection fraction is 40%.  Left ventricular diastolic dysfunction.  There is abnormal septal wall motion consistent with left bundle branch block.  With normal right ventricular systolic function.  Normal central venous pressure (3 mmHg).  The estimated PA systolic pressure is 27 mmHg.    Cath 2014-non-obstructive CAD  Assessment:      1. Pre-op evaluation    2. Coronary artery disease of native artery of native heart with stable angina pectoris    3. Chronic combined systolic and diastolic congestive heart failure    4. Chronic venous insufficiency of lower extremity    5. LBBB (left bundle branch block)    6. Living-related donor kidney transplant (daughter) - 6/12/15    7. Acute deep vein thrombosis (DVT) of other specified vein of left lower extremity    8. Chronic anticoagulation    9. Morbid obesity with BMI of 40.0-44.9, adult    10. DM (diabetes mellitus), type 2 with complications    11. Severe obesity (BMI >= 40)    12. Obstructive sleep apnea      Patient presents for pre-op clearance. Appears overloaded on exam. Check BNP, BMP, CXR today. Update echo. Will plan to increase diuretics pending lab results. Not ready to proceed with CTR at present time, needs to be optimized, despite low risk surgical procedure.    Plan:   -CXR, BNP, BMP today with phone review  -Echo ASAP  -Continue current CV meds/mgmt for now, will plan to increase Lasix pending lab work  -Hypoalbuminemia also likely contributing factor to LE edema  -Not cleared to proceed with surgery at present time

## 2023-08-22 NOTE — TELEPHONE ENCOUNTER
Please phone patient. Labs reviewed. Creatinine stable. BNP is up from his baseline.    CXR showed NAF.    Recommend he increase Lasix to 60 mg BID x 3 days. Phone review on Friday before weekend.    He needs to be re-evaluated next week.

## 2023-08-22 NOTE — TELEPHONE ENCOUNTER
I spoke with pt and his wife to confirm instructions. Pt will  lasix 20mg from pharmacy to take with 40mg tabs he already has to equal daily total of lasix 60mg bid.   Wife repeated back instructions correctly but gave them my direct # to call back with any questions/concerns.   F/u appt scheduled for next week.

## 2023-08-22 NOTE — TELEPHONE ENCOUNTER
I called pt. Gave instructions on medications.   He says he is not home right now to confirm mg on lasix tabs at home. He asked that I call him back at 4:30 to confirm home lasix dosage.

## 2023-08-23 ENCOUNTER — TELEPHONE (OUTPATIENT)
Dept: CARDIOLOGY | Facility: CLINIC | Age: 68
End: 2023-08-23
Payer: COMMERCIAL

## 2023-08-23 RX ORDER — FUROSEMIDE 20 MG/1
20 TABLET ORAL 2 TIMES DAILY
Qty: 6 TABLET | Refills: 0 | Status: SHIPPED | OUTPATIENT
Start: 2023-08-23 | End: 2023-08-24 | Stop reason: SDUPTHER

## 2023-08-23 RX ORDER — METOPROLOL SUCCINATE 25 MG/1
25 TABLET, EXTENDED RELEASE ORAL DAILY
Qty: 90 TABLET | Refills: 1 | Status: SHIPPED | OUTPATIENT
Start: 2023-08-23 | End: 2023-10-17 | Stop reason: SDUPTHER

## 2023-08-23 NOTE — TELEPHONE ENCOUNTER
Contacted pt and informed him Echo reviewed and showed normal heart strength/function. EF 50-55%. Pt vu w/o q/c    --- CARLTON Pierson 08/23/25 09:25 AM ---  Please phone patient. Echo reviewed and showed normal heart strength/function. EF 50-55%.    Thanks

## 2023-08-23 NOTE — TELEPHONE ENCOUNTER
Please phone patient. Echo reviewed and showed normal heart strength/function. EF 50-55%.    Thanks

## 2023-08-24 DIAGNOSIS — I50.42 CHRONIC COMBINED SYSTOLIC AND DIASTOLIC CONGESTIVE HEART FAILURE: ICD-10-CM

## 2023-08-24 RX ORDER — FUROSEMIDE 20 MG/1
20 TABLET ORAL 2 TIMES DAILY
Qty: 6 TABLET | Refills: 0 | Status: SHIPPED | OUTPATIENT
Start: 2023-08-24 | End: 2023-09-18 | Stop reason: DRUGHIGH

## 2023-08-25 ENCOUNTER — TELEPHONE (OUTPATIENT)
Dept: CARDIOLOGY | Facility: CLINIC | Age: 68
End: 2023-08-25
Payer: COMMERCIAL

## 2023-08-25 DIAGNOSIS — I50.42 CHRONIC COMBINED SYSTOLIC AND DIASTOLIC CONGESTIVE HEART FAILURE: ICD-10-CM

## 2023-08-25 RX ORDER — FUROSEMIDE 20 MG/1
20 TABLET ORAL 2 TIMES DAILY
Qty: 6 TABLET | Refills: 0 | OUTPATIENT
Start: 2023-08-25 | End: 2024-08-24

## 2023-08-25 NOTE — TELEPHONE ENCOUNTER
Please call and check on his CHF symptoms. Confirm he is taking 60 mg BID x 3 days only.    Route back,    Thanks

## 2023-08-25 NOTE — TELEPHONE ENCOUNTER
I spoke with pt. He says his daughter picked up the 20mg tabs for him yesterday.   He took 60mg last night and again this AM.   So far no improvement in LE edema.   Still no worsening sob.   Sleeping in recliner.   No weights  Advised to keep wt log starting tomorrow morning.   I let him know I would update provider on progress.

## 2023-08-27 DIAGNOSIS — R05.8 ALLERGIC COUGH: ICD-10-CM

## 2023-08-27 DIAGNOSIS — I50.42 CHRONIC COMBINED SYSTOLIC AND DIASTOLIC CONGESTIVE HEART FAILURE: ICD-10-CM

## 2023-08-27 NOTE — TELEPHONE ENCOUNTER
Care Due:                  Date            Visit Type   Department     Provider  --------------------------------------------------------------------------------                                EP -                              PRIMARY      [facility] INTER[facility]  Last Visit: 03-      CARE ([facility])   MEDICINE       Alix Kowalski  Next Visit: None Scheduled  None         None Found                                                            Last  Test          Frequency    Reason                     Performed    Due Date  --------------------------------------------------------------------------------    HBA1C.......  6 months...  glimepiride, insulin.....  03- 09-    [facility] Embedded Care Due Messages. Reference number: [id number].   8/27/2023 3:17:44 PM CDT

## 2023-08-28 RX ORDER — SACUBITRIL AND VALSARTAN 97; 103 MG/1; MG/1
1 TABLET, FILM COATED ORAL 2 TIMES DAILY
Qty: 180 TABLET | Refills: 1 | Status: SHIPPED | OUTPATIENT
Start: 2023-08-28 | End: 2023-12-28 | Stop reason: SDUPTHER

## 2023-08-28 RX ORDER — MONTELUKAST SODIUM 10 MG/1
10 TABLET ORAL NIGHTLY
Qty: 90 TABLET | Refills: 0 | Status: SHIPPED | OUTPATIENT
Start: 2023-08-28 | End: 2023-11-23 | Stop reason: SDUPTHER

## 2023-08-28 NOTE — TELEPHONE ENCOUNTER
Provider Staff:  Action required. This patient has received emergency care.     Please schedule patient for a follow up appointment within next 90 days.     Thanks!  [facility]     Appointments      Date Provider   Last Visit   3/28/2023 Alix Kowalski DO   Next Visit   Visit date not found Alix Kowalski DO        Refill Decision Note   Mitch Whittaker  is requesting a refill authorization.  Brief Assessment and Rationale for Refill:  Approve     Medication Therapy Plan:  ED documentation reviewed & no changes to therapy noted.      Pharmacist review requested: Yes   Extended chart review required: Yes   Comments:     Note composed:3:03 PM 08/28/2023

## 2023-08-28 NOTE — TELEPHONE ENCOUNTER
Refill Routing Note   Medication(s) are not appropriate for processing by [facility] for the following reason(s):      Patient seen in ED/Hospital since LOV with provider    [facility] action(s):  Defer Care Due:  Labs due      Refill request routed to [facility] Review Pool for Pharmacist Review.       Pharmacist review requested: Yes     Appointments  past 12m or future 3m with PCP    Date Provider   Last Visit   3/28/2023 Alix Kowalski DO   Next Visit   Visit date not found Alix Kowalski DO   ED visits in past 90 days: 1        Note composed:10:49 AM 08/28/2023

## 2023-08-31 ENCOUNTER — TELEPHONE (OUTPATIENT)
Dept: CARDIOLOGY | Facility: HOSPITAL | Age: 68
End: 2023-08-31
Payer: COMMERCIAL

## 2023-08-31 NOTE — TELEPHONE ENCOUNTER
Please call to check on patient.    He needs f/u appt. He missed appt yesterday, needs to be seen.    Thanks

## 2023-09-01 ENCOUNTER — LAB VISIT (OUTPATIENT)
Dept: LAB | Facility: HOSPITAL | Age: 68
End: 2023-09-01
Payer: COMMERCIAL

## 2023-09-01 ENCOUNTER — OFFICE VISIT (OUTPATIENT)
Dept: CARDIOLOGY | Facility: CLINIC | Age: 68
End: 2023-09-01
Payer: COMMERCIAL

## 2023-09-01 VITALS
HEART RATE: 107 BPM | OXYGEN SATURATION: 97 % | BODY MASS INDEX: 41.87 KG/M2 | WEIGHT: 266.75 LBS | DIASTOLIC BLOOD PRESSURE: 74 MMHG | SYSTOLIC BLOOD PRESSURE: 110 MMHG | HEIGHT: 67 IN

## 2023-09-01 DIAGNOSIS — I25.118 CORONARY ARTERY DISEASE OF NATIVE ARTERY OF NATIVE HEART WITH STABLE ANGINA PECTORIS: ICD-10-CM

## 2023-09-01 DIAGNOSIS — I95.1 ORTHOSTATIC HYPOTENSION: ICD-10-CM

## 2023-09-01 DIAGNOSIS — E11.22 HYPERTENSION ASSOCIATED WITH STAGE 3 CHRONIC KIDNEY DISEASE DUE TO TYPE 2 DIABETES MELLITUS: ICD-10-CM

## 2023-09-01 DIAGNOSIS — I12.9 HYPERTENSION ASSOCIATED WITH STAGE 3 CHRONIC KIDNEY DISEASE DUE TO TYPE 2 DIABETES MELLITUS: ICD-10-CM

## 2023-09-01 DIAGNOSIS — I50.42 CHRONIC COMBINED SYSTOLIC AND DIASTOLIC CONGESTIVE HEART FAILURE: Primary | ICD-10-CM

## 2023-09-01 DIAGNOSIS — I42.8 INFILTRATIVE CARDIOMYOPATHY: ICD-10-CM

## 2023-09-01 DIAGNOSIS — I44.7 LBBB (LEFT BUNDLE BRANCH BLOCK): ICD-10-CM

## 2023-09-01 DIAGNOSIS — N18.30 HYPERTENSION ASSOCIATED WITH STAGE 3 CHRONIC KIDNEY DISEASE DUE TO TYPE 2 DIABETES MELLITUS: ICD-10-CM

## 2023-09-01 DIAGNOSIS — I50.42 CHRONIC COMBINED SYSTOLIC AND DIASTOLIC CONGESTIVE HEART FAILURE: ICD-10-CM

## 2023-09-01 DIAGNOSIS — I87.2 CHRONIC VENOUS INSUFFICIENCY OF LOWER EXTREMITY: ICD-10-CM

## 2023-09-01 PROCEDURE — 3008F BODY MASS INDEX DOCD: CPT | Mod: CPTII,S$GLB,,

## 2023-09-01 PROCEDURE — 1101F PT FALLS ASSESS-DOCD LE1/YR: CPT | Mod: CPTII,S$GLB,,

## 2023-09-01 PROCEDURE — 1159F PR MEDICATION LIST DOCUMENTED IN MEDICAL RECORD: ICD-10-PCS | Mod: CPTII,S$GLB,,

## 2023-09-01 PROCEDURE — 1125F PR PAIN SEVERITY QUANTIFIED, PAIN PRESENT: ICD-10-PCS | Mod: CPTII,S$GLB,,

## 2023-09-01 PROCEDURE — 1101F PR PT FALLS ASSESS DOC 0-1 FALLS W/OUT INJ PAST YR: ICD-10-PCS | Mod: CPTII,S$GLB,,

## 2023-09-01 PROCEDURE — 3288F FALL RISK ASSESSMENT DOCD: CPT | Mod: CPTII,S$GLB,,

## 2023-09-01 PROCEDURE — 99214 PR OFFICE/OUTPT VISIT, EST, LEVL IV, 30-39 MIN: ICD-10-PCS | Mod: S$GLB,,,

## 2023-09-01 PROCEDURE — 4010F ACE/ARB THERAPY RXD/TAKEN: CPT | Mod: CPTII,S$GLB,,

## 2023-09-01 PROCEDURE — 3044F HG A1C LEVEL LT 7.0%: CPT | Mod: CPTII,S$GLB,,

## 2023-09-01 PROCEDURE — 1160F PR REVIEW ALL MEDS BY PRESCRIBER/CLIN PHARMACIST DOCUMENTED: ICD-10-PCS | Mod: CPTII,S$GLB,,

## 2023-09-01 PROCEDURE — 3061F PR NEG MICROALBUMINURIA RESULT DOCUMENTED/REVIEW: ICD-10-PCS | Mod: CPTII,S$GLB,,

## 2023-09-01 PROCEDURE — 80053 COMPREHEN METABOLIC PANEL: CPT

## 2023-09-01 PROCEDURE — 3044F PR MOST RECENT HEMOGLOBIN A1C LEVEL <7.0%: ICD-10-PCS | Mod: CPTII,S$GLB,,

## 2023-09-01 PROCEDURE — 3078F PR MOST RECENT DIASTOLIC BLOOD PRESSURE < 80 MM HG: ICD-10-PCS | Mod: CPTII,S$GLB,,

## 2023-09-01 PROCEDURE — 3074F PR MOST RECENT SYSTOLIC BLOOD PRESSURE < 130 MM HG: ICD-10-PCS | Mod: CPTII,S$GLB,,

## 2023-09-01 PROCEDURE — 99214 OFFICE O/P EST MOD 30 MIN: CPT | Mod: S$GLB,,,

## 2023-09-01 PROCEDURE — 3066F NEPHROPATHY DOC TX: CPT | Mod: CPTII,S$GLB,,

## 2023-09-01 PROCEDURE — 3074F SYST BP LT 130 MM HG: CPT | Mod: CPTII,S$GLB,,

## 2023-09-01 PROCEDURE — 36415 COLL VENOUS BLD VENIPUNCTURE: CPT

## 2023-09-01 PROCEDURE — 99999 PR PBB SHADOW E&M-EST. PATIENT-LVL V: ICD-10-PCS | Mod: PBBFAC,,,

## 2023-09-01 PROCEDURE — 1125F AMNT PAIN NOTED PAIN PRSNT: CPT | Mod: CPTII,S$GLB,,

## 2023-09-01 PROCEDURE — 3288F PR FALLS RISK ASSESSMENT DOCUMENTED: ICD-10-PCS | Mod: CPTII,S$GLB,,

## 2023-09-01 PROCEDURE — 83880 ASSAY OF NATRIURETIC PEPTIDE: CPT

## 2023-09-01 PROCEDURE — 3008F PR BODY MASS INDEX (BMI) DOCUMENTED: ICD-10-PCS | Mod: CPTII,S$GLB,,

## 2023-09-01 PROCEDURE — 3066F PR DOCUMENTATION OF TREATMENT FOR NEPHROPATHY: ICD-10-PCS | Mod: CPTII,S$GLB,,

## 2023-09-01 PROCEDURE — 99999 PR PBB SHADOW E&M-EST. PATIENT-LVL V: CPT | Mod: PBBFAC,,,

## 2023-09-01 PROCEDURE — 1160F RVW MEDS BY RX/DR IN RCRD: CPT | Mod: CPTII,S$GLB,,

## 2023-09-01 PROCEDURE — 3078F DIAST BP <80 MM HG: CPT | Mod: CPTII,S$GLB,,

## 2023-09-01 PROCEDURE — 1159F MED LIST DOCD IN RCRD: CPT | Mod: CPTII,S$GLB,,

## 2023-09-01 PROCEDURE — 4010F PR ACE/ARB THEARPY RXD/TAKEN: ICD-10-PCS | Mod: CPTII,S$GLB,,

## 2023-09-01 PROCEDURE — 3061F NEG MICROALBUMINURIA REV: CPT | Mod: CPTII,S$GLB,,

## 2023-09-01 NOTE — PROGRESS NOTES
Subjective:   Patient ID:  Mitch Whittaker is a 67 y.o. male who presents for evaluation of No chief complaint on file.      Stefani Whittaker is a 67 year old male patient whose current medical conditions include combined CHF (EF 50-55%%), CKD, DM type II, MARION, HTN, hyperlipidemia, LBBB, DVT (on Eliquis) ESRD s/p renal transplant seen last week by Vale Booker PA-C for pre op eval and had some SOB/LE edema. Here today for follow up, reports some improvement in LE edema, reports he has not been able to wear his regular shoes in 3 months due to the swelling. Denies any CP, SOB dizziness at this time.     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 RNA NEGATIVE 5/2015, 2021, 2022    History of colon polyps     HPTH (hyperparathyroidism)     Hyperlipidemia     Hypertension associated with stage 3 chronic kidney disease due to type 2 diabetes mellitus     LBBB (left bundle branch block) 12/20/2021    Morbid obesity with BMI of 45.0-49.9, adult     Nephrolithiasis 6/7/2013    PCO (posterior capsular opacification), left 03/04/2019    Proteinuria     resolved s/p transplant    S/P kidney transplant     Sleep apnea     Type 2 diabetes,  "uncontrolled, with retinopathy     Type II diabetes mellitus with renal manifestations        Past Surgical History:   Procedure Laterality Date    CARDIAC CATHETERIZATION  2008    normal coronary    COLONOSCOPY N/A 4/5/2018    Procedure: COLONOSCOPY;  Surgeon: Chava Ronquillo MD;  Location: [facility] ENDO;  Service: Endoscopy;  Laterality: N/A;    COLONOSCOPY N/A 5/2/2022    Procedure: COLONOSCOPY;  Surgeon: Alix Puente MD;  Location: [facility] ENDO;  Service: Endoscopy;  Laterality: N/A;    COLONOSCOPY N/A 6/7/2023    Procedure: COLONOSCOPY - rule out CMV  Cardiac clearance/Eliquis hold approval received on 05/21/23 per Dr. Meade, cardiology.  Note in encounters.  LB;  Surgeon: Daniella Shah MD;  Location: [facility] ENDO;  Service: Endoscopy;  Laterality: N/A;    KIDNEY TRANSPLANT      RETINAL LASER PROCEDURE         Social History     Tobacco Use    Smoking status: Former     Current packs/day: 0.00     Types: Cigarettes     Quit date: 5/25/2013     Years since quitting: 10.2     Passive exposure: Past    Smokeless tobacco: Former     Quit date: 6/11/2013    Tobacco comments:     used marijuana since [id number], stopped after started dialysis   Substance Use Topics    Alcohol use: No     Alcohol/week: 0.0 standard drinks of alcohol    Drug use: No     Comment:         Family History   Problem Relation Age of Onset    Diabetes Mother     Hypertension Mother     Heart failure Mother     Kidney disease Sister         ESRD    Diabetes Sister     Diabetes Maternal Grandmother     Cancer Neg Hx        Current Outpatient Medications on File Prior to Visit   Medication Sig Dispense Refill    amitriptyline (ELAVIL) 25 MG tablet Take 1 tablet (25 mg total) by mouth nightly as needed for Insomnia. 30 tablet 2    apixaban (ELIQUIS) 5 mg Tab Take 1 tablet (5 mg total) by mouth 2 (two) times daily. 180 tablet 1    aspirin (ECOTRIN) 81 MG EC tablet Take 1 tablet by mouth Daily.       BD ULTRA-FINE MINI PEN NEEDLE 31 gauge x 3/16" " Ndle Use to inject insulin as needed up to 4 times daily 100 each 3    blood sugar diagnostic Strp Use to test four times per day 150 strip 11    blood sugar diagnostic Strp  by Misc.(Non-Drug; Combo Route) route 4 (four) times daily before meals and nightly. 100 each 1    blood-glucose meter (FREESTYLE LITE METER) kit 1 each 4 (four) times daily. 1 each 0    calcitRIOL (ROCALTROL) 0.25 MCG Cap Take 1 capsule (0.25 mcg total) by mouth once daily. 30 capsule 11    famotidine (PEPCID) 20 MG tablet TAKE ONE TABLET BY MOUTH EVERY EVENING 30 tablet 11    fish oil-omega-3 fatty acids 300-1,000 mg capsule Take 1 g by mouth once daily.      furosemide (LASIX) 20 MG tablet Take 1 tablet (20 mg total) by mouth 2 (two) times daily. Take with furosemide 40mg tablets to equal total of 60mg 2 times daily 6 tablet 0    furosemide (LASIX) 80 MG tablet Take one-half tablet (40 mg) by mouth 2 (two) times daily. 30 tablet 11    glimepiride (AMARYL) 2 MG tablet Take 1 tablet (2 mg total) by mouth before breakfast. 90 tablet 3    insulin (LANTUS SOLOSTAR U-100 INSULIN) glargine 100 units/mL SubQ pen Inject 35 Units into the skin 2 (two) times daily. 30 mL 2    insulin aspart U-100 (NOVOLOG FLEXPEN U-100 INSULIN) 100 unit/mL (3 mL) InPn pen Inject 25 Units into the skin 3 (three) times daily with meals. 30 mL 2    ketoconazole (NIZORAL) 200 mg Tab Take HALF A tablet (100 mg total) by mouth once daily. 15 tablet 9    lancets (MICROLET LANCET) Misc 100 lancets by Misc.(Non-Drug; Combo Route) route 4 (four) times daily before meals and nightly. 100 each 11    magnesium oxide (MAG-OX) 400 mg (241.3 mg magnesium) tablet Take 1 tablet (400 mg total) by mouth once daily. 90 tablet 2    metoprolol succinate (TOPROL-XL) 25 MG 24 hr tablet Take 1 tablet (25 mg total) by mouth once daily. 90 tablet 1    multivitamin (THERAGRAN) tablet Take 1 tablet by mouth once daily.      mycophenolate (CELLCEPT) 250 mg Cap Take 2 capsules (500 mg total) by mouth  "2 (two) times daily. 120 capsule 11    ondansetron (ZOFRAN) 4 MG tablet Take 1 tablet (4 mg total) by mouth every 6 (six) hours. 30 tablet 0    pen needle, diabetic (BD INSULIN PEN NEEDLE UF SHORT) 31 gauge x 5/16" Ndle USE TO INJECT INSULIN TWICE A  each 5    potassium chloride SA (K-DUR,KLOR-CON) 20 MEQ tablet Take 2 tablets (40 mEq total) by mouth once daily. 180 tablet 1    predniSONE (DELTASONE) 5 MG tablet Take 1 tablet (5 mg total) by mouth once daily. 30 tablet 11    promethazine-dextromethorphan (PROMETHAZINE-DM) 6.25-15 mg/5 mL Syrp Take 5 mLs by mouth every 6 (six) hours as needed (cough). 180 mL 0    rosuvastatin (CRESTOR) 40 MG Tab Take 1 tablet (40 mg total) by mouth once daily in the evening. 90 tablet 1    sacubitriL-valsartan (ENTRESTO)  mg per tablet Take 1 tablet by mouth 2 (two) times daily. 180 tablet 1    tacrolimus (PROGRAF) 1 MG Cap Take 4 capsules (4 mg total) by mouth every 12 (twelve) hours. 240 capsule 11    tamsulosin (FLOMAX) 0.4 mg Cap Take 1 capsule (0.4 mg total) by mouth once daily. 30 capsule 11    valGANciclovir (VALCYTE) 450 mg Tab Take 1 tablet (450 mg total) by mouth every other day. 45 tablet 0    gabapentin (NEURONTIN) 100 MG capsule Take 1 capsule (100 mg total) by mouth 3 (three) times daily. (Patient not taking: Reported on 9/1/2023) 90 capsule 11    montelukast (SINGULAIR) 10 mg tablet Take 1 tablet (10 mg total) by mouth every evening. (Patient not taking: Reported on 9/1/2023) 90 tablet 0    traMADoL (ULTRAM) 50 mg tablet Take 1 tablet (50 mg total) by mouth every 4 (four) hours as needed for Pain. (Patient not taking: Reported on 9/1/2023) 30 tablet 0    triamcinolone acetonide 0.1% (KENALOG) 0.1 % ointment Apply topically 2 (two) times daily. Use on bilateral lower legs. (Patient not taking: Reported on 9/1/2023) 454 g 1    [DISCONTINUED] diclofenac sodium (VOLTAREN) 1 % Gel Apply 2 g topically 3 (three) times daily. (Patient not taking: Reported on " 8/22/2023) 450 g 2     Current Facility-Administered Medications on File Prior to Visit   Medication Dose Route Frequency Provider Last Rate Last Admin    acetaminophen tablet 650 mg  650 mg Oral Once PRN Sal Lopez MD          Wt Readings from Last 3 Encounters:   08/22/23 125.6 kg (277 lb)   08/22/23 126 kg (277 lb 12.5 oz)   07/31/23 123.4 kg (272 lb 0.8 oz)     Temp Readings from Last 3 Encounters:   08/01/23 98.6 °F (37 °C) (Oral)   07/08/23 98.5 °F (36.9 °C) (Tympanic)   06/07/23 97.8 °F (36.6 °C)     BP Readings from Last 3 Encounters:   08/22/23 126/64   08/01/23 (!) 140/67   07/08/23 (!) 120/56     Pulse Readings from Last 3 Encounters:   08/22/23 (!) 115   08/01/23 97   07/08/23 (!) 112        Review of Systems   Constitutional: Negative.   HENT: Negative.     Eyes: Negative.    Cardiovascular:  Positive for leg swelling.   Respiratory: Negative.     Skin: Negative.    Musculoskeletal: Negative.    Gastrointestinal: Negative.    Genitourinary: Negative.    Neurological: Negative.    Psychiatric/Behavioral: Negative.         Objective:   Physical Exam  Vitals and nursing note reviewed.   Constitutional:       Appearance: Normal appearance.   HENT:      Head: Normocephalic and atraumatic.   Eyes:      General:         Right eye: No discharge.         Left eye: No discharge.      Pupils: Pupils are equal, round, and reactive to light.   Cardiovascular:      Rate and Rhythm: Regular rhythm. Tachycardia present.      Heart sounds: S1 normal and S2 normal. No murmur heard.     No friction rub.   Pulmonary:      Effort: Pulmonary effort is normal. No respiratory distress.      Breath sounds: Normal breath sounds. No rales.   Abdominal:      General: There is distension.      Palpations: Abdomen is soft.      Tenderness: There is no abdominal tenderness.   Musculoskeletal:      Cervical back: Neck supple.      Right lower leg: Edema present.      Left lower leg: Edema present.   Skin:     General: Skin is  warm and dry.   Neurological:      General: No focal deficit present.      Mental Status: He is alert and oriented to person, place, and time.   Psychiatric:         Mood and Affect: Mood normal.         Behavior: Behavior normal.         Thought Content: Thought content normal.         Lab Results   Component Value Date    CHOL 174 03/28/2023    CHOL 183 09/19/2022    CHOL 185 03/11/2022     Lab Results   Component Value Date    HDL 39 (L) 03/28/2023    HDL 46 09/19/2022    HDL 49 03/11/2022     Lab Results   Component Value Date    LDLCALC 96.6 03/28/2023    LDLCALC 114.4 09/19/2022    LDLCALC 111.0 03/11/2022     Lab Results   Component Value Date    TRIG 192 (H) 03/28/2023    TRIG 113 09/19/2022    TRIG 125 03/11/2022     Lab Results   Component Value Date    CHOLHDL 22.4 03/28/2023    CHOLHDL 25.1 09/19/2022    CHOLHDL 26.5 03/11/2022       Chemistry        Component Value Date/Time     08/22/2023 0947    K 4.3 08/22/2023 0947     08/22/2023 0947    CO2 27 08/22/2023 0947    BUN 31 (H) 08/22/2023 0947    CREATININE 2.4 (H) 08/22/2023 0947     (H) 08/22/2023 0947        Component Value Date/Time    CALCIUM 8.7 08/22/2023 0947    ALKPHOS 68 07/31/2023 2149    AST 27 07/31/2023 2149    ALT 17 07/31/2023 2149    BILITOT 0.5 07/31/2023 2149    ESTGFRAFRICA 22.7 (A) 07/06/2022 0855    EGFRNONAA 19.6 (A) 07/06/2022 0855          Lab Results   Component Value Date    TSH 0.999 03/11/2022     Lab Results   Component Value Date    INR 1.0 06/18/2015    INR 1.0 06/12/2015    INR 0.9 05/25/2015     @RESUFAST(WBC,HGB,HCT,MCV,PLT)  @LABRCNTIP(BNP,BNPTRIAGEBLO)@  CrCl cannot be calculated (Patient's most recent lab result is older than the maximum 7 days allowed.).     Results for orders placed during the hospital encounter of 08/22/23    Echo    Interpretation Summary    Left Ventricle: The left ventricle is normal in size. Moderately increased ventricular mass. Moderately increased wall thickness. Normal  wall motion. There is low normal systolic function with a visually estimated ejection fraction of 50 - 55%. There is normal diastolic function.    Left Atrium: Left atrium is severely dilated.    Right Ventricle: Normal right ventricular cavity size. Wall thickness is normal. Right ventricle wall motion  is normal. Systolic function is normal.    Right Atrium: Right atrium is dilated.    IVC/SVC: Normal venous pressure at 3 mmHg.     No results found for this or any previous visit.     Assessment:      1. Chronic combined systolic and diastolic congestive heart failure    2. Coronary artery disease of native artery of native heart with stable angina pectoris    3. Hypertension associated with stage 3 chronic kidney disease due to type 2 diabetes mellitus    4. Chronic venous insufficiency of lower extremity    5. 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    6. LBBB (left bundle branch block)    7. 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    LBBB (left bundle branch block)    Orthostatic hypotension      Currently taking 80mg lasix daily will check labs with phone review, consider metolazone  Crt 2.4 two weeks ago  RF modification, low Na diet low fat  Daily exercise as tolerated    RTC next week    Courtney Guillot, FNP-C [facility]

## 2023-09-02 LAB
ALBUMIN SERPL BCP-MCNC: 3.3 G/DL (ref 3.5–5.2)
ALP SERPL-CCNC: 53 U/L (ref 55–135)
ALT SERPL W/O P-5'-P-CCNC: 15 U/L (ref 10–44)
ANION GAP SERPL CALC-SCNC: 12 MMOL/L (ref 8–16)
AST SERPL-CCNC: 18 U/L (ref 10–40)
BILIRUB SERPL-MCNC: 0.5 MG/DL (ref 0.1–1)
BUN SERPL-MCNC: 34 MG/DL (ref 8–23)
CALCIUM SERPL-MCNC: 9.1 MG/DL (ref 8.7–10.5)
CHLORIDE SERPL-SCNC: 105 MMOL/L (ref 95–110)
CO2 SERPL-SCNC: 26 MMOL/L (ref 23–29)
CREAT SERPL-MCNC: 2.2 MG/DL (ref 0.5–1.4)
EST. GFR  (NO RACE VARIABLE): 31.6 ML/MIN/1.73 M^2
GLUCOSE SERPL-MCNC: 153 MG/DL (ref 70–110)
POTASSIUM SERPL-SCNC: 4.2 MMOL/L (ref 3.5–5.1)
PROT SERPL-MCNC: 6.3 G/DL (ref 6–8.4)
SODIUM SERPL-SCNC: 143 MMOL/L (ref 136–145)

## 2023-09-06 LAB — NT-PROBNP SERPL IA-MCNC: 1195 PG/ML

## 2023-09-07 DIAGNOSIS — I50.42 CHRONIC COMBINED SYSTOLIC AND DIASTOLIC CONGESTIVE HEART FAILURE: Primary | ICD-10-CM

## 2023-09-08 ENCOUNTER — TELEPHONE (OUTPATIENT)
Dept: CARDIOLOGY | Facility: CLINIC | Age: 68
End: 2023-09-08
Payer: COMMERCIAL

## 2023-09-08 NOTE — TELEPHONE ENCOUNTER
Pt was contacted about results:     Please phone patient and let him know his fluid marker is still elevated. He can take 1 40mg tablet and 1 20mg tablet am and pm (total of 60mg am and 60mg pm) for 3 days. Then back to 40mg tablet am and pm. Will repeat labs next week         Pt verbalized understanding with no questions or concerns.     Instructed pt to write down new medication instructions and informed pt of Lab for 9/15 morales       ----- Message from Taryn Avila NP sent at 9/7/2023  1:40 PM CDT -----  Please phone patient and let him know his fluid marker is still elevated. He can take 1 40mg tablet and 1 20mg tablet am and pm (total of 60mg am and 60mg pm) for 3 days. Then back to 40mg tablet am and pm. Will repeat labs next week

## 2023-09-10 NOTE — TELEPHONE ENCOUNTER
No care due was identified.  Health [facility] Embedded Care Due Messages. Reference number: [id number].   9/10/2023 5:08:49 AM CDT

## 2023-09-11 NOTE — TELEPHONE ENCOUNTER
Refill Routing Note   Medication(s) are not appropriate for processing by [facility] for the following reason(s):      Patient seen in ED/Hospital since LOV with provider  Required labs abnormal    [facility] action(s):  Defer Care Due:  None identified     Medication Therapy Plan: ED Visit 7/31/23: Renal failure      Appointments  past 12m or future 3m with PCP    Date Provider   Last Visit   3/28/2023 Alix Kowalski DO   Next Visit   Visit date not found Alix Kowalski DO   ED visits in past 90 days: 1        Note composed:11:32 AM 09/11/2023

## 2023-09-12 RX ORDER — INSULIN ASPART 100 [IU]/ML
25 INJECTION, SOLUTION INTRAVENOUS; SUBCUTANEOUS
Qty: 30 ML | Refills: 2 | Status: SHIPPED | OUTPATIENT
Start: 2023-09-12

## 2023-09-12 NOTE — TELEPHONE ENCOUNTER
No care due was identified.  Health [facility] Embedded Care Due Messages. Reference number: [id number].   9/12/2023 5:09:51 AM CDT

## 2023-09-13 RX ORDER — INSULIN GLARGINE 100 [IU]/ML
35 INJECTION, SOLUTION SUBCUTANEOUS 2 TIMES DAILY
Qty: 60 ML | Refills: 0 | Status: SHIPPED | OUTPATIENT
Start: 2023-09-13 | End: 2024-02-16 | Stop reason: SDUPTHER

## 2023-09-13 NOTE — TELEPHONE ENCOUNTER
Refill Routing Note   Medication(s) are not appropriate for processing by [facility] for the following reason(s):      Patient seen in ED/Hospital since LOV with provider  Required labs abnormal    [facility] action(s):  Defer Care Due:  None identified            Appointments  past 12m or future 3m with PCP    Date Provider   Last Visit   3/28/2023 Alix Kowalski DO   Next Visit   Visit date not found Alix Kowalski DO   ED visits in past 90 days: 1        Note composed:3:44 AM 09/13/2023

## 2023-09-15 ENCOUNTER — LAB VISIT (OUTPATIENT)
Dept: LAB | Facility: HOSPITAL | Age: 68
End: 2023-09-15
Payer: COMMERCIAL

## 2023-09-15 DIAGNOSIS — I50.42 CHRONIC COMBINED SYSTOLIC AND DIASTOLIC CONGESTIVE HEART FAILURE: ICD-10-CM

## 2023-09-15 LAB
ANION GAP SERPL CALC-SCNC: 12 MMOL/L (ref 8–16)
BUN SERPL-MCNC: 45 MG/DL (ref 8–23)
CALCIUM SERPL-MCNC: 9.4 MG/DL (ref 8.7–10.5)
CHLORIDE SERPL-SCNC: 107 MMOL/L (ref 95–110)
CO2 SERPL-SCNC: 27 MMOL/L (ref 23–29)
CREAT SERPL-MCNC: 2.4 MG/DL (ref 0.5–1.4)
EST. GFR  (NO RACE VARIABLE): 28.5 ML/MIN/1.73 M^2
GLUCOSE SERPL-MCNC: 141 MG/DL (ref 70–110)
POTASSIUM SERPL-SCNC: 4.1 MMOL/L (ref 3.5–5.1)
SODIUM SERPL-SCNC: 146 MMOL/L (ref 136–145)

## 2023-09-15 PROCEDURE — 36415 COLL VENOUS BLD VENIPUNCTURE: CPT

## 2023-09-15 PROCEDURE — 80048 BASIC METABOLIC PNL TOTAL CA: CPT

## 2023-09-17 DIAGNOSIS — I50.42 CHRONIC COMBINED SYSTOLIC AND DIASTOLIC CONGESTIVE HEART FAILURE: ICD-10-CM

## 2023-09-17 RX ORDER — POTASSIUM CHLORIDE 20 MEQ/1
40 TABLET, EXTENDED RELEASE ORAL DAILY
Qty: 180 TABLET | Refills: 1 | Status: CANCELLED | OUTPATIENT
Start: 2023-09-17

## 2023-09-18 ENCOUNTER — OFFICE VISIT (OUTPATIENT)
Dept: CARDIOLOGY | Facility: CLINIC | Age: 68
End: 2023-09-18
Payer: COMMERCIAL

## 2023-09-18 VITALS
SYSTOLIC BLOOD PRESSURE: 116 MMHG | BODY MASS INDEX: 40.59 KG/M2 | HEART RATE: 120 BPM | OXYGEN SATURATION: 96 % | DIASTOLIC BLOOD PRESSURE: 84 MMHG | WEIGHT: 258.63 LBS | HEIGHT: 67 IN

## 2023-09-18 DIAGNOSIS — I50.33 ACUTE ON CHRONIC DIASTOLIC CONGESTIVE HEART FAILURE: Primary | ICD-10-CM

## 2023-09-18 DIAGNOSIS — I25.118 CORONARY ARTERY DISEASE OF NATIVE ARTERY OF NATIVE HEART WITH STABLE ANGINA PECTORIS: ICD-10-CM

## 2023-09-18 DIAGNOSIS — I82.492 ACUTE DEEP VEIN THROMBOSIS (DVT) OF OTHER SPECIFIED VEIN OF LEFT LOWER EXTREMITY: ICD-10-CM

## 2023-09-18 DIAGNOSIS — I44.7 LBBB (LEFT BUNDLE BRANCH BLOCK): ICD-10-CM

## 2023-09-18 DIAGNOSIS — E66.01 SEVERE OBESITY (BMI >= 40): ICD-10-CM

## 2023-09-18 DIAGNOSIS — E66.01 MORBID OBESITY WITH BMI OF 40.0-44.9, ADULT: ICD-10-CM

## 2023-09-18 DIAGNOSIS — E66.01 CLASS 3 SEVERE OBESITY DUE TO EXCESS CALORIES WITH SERIOUS COMORBIDITY AND BODY MASS INDEX (BMI) OF 40.0 TO 44.9 IN ADULT: ICD-10-CM

## 2023-09-18 DIAGNOSIS — Z94.0 KIDNEY TRANSPLANT STATUS, LIVING RELATED DONOR: Chronic | ICD-10-CM

## 2023-09-18 DIAGNOSIS — G47.33 OBSTRUCTIVE SLEEP APNEA: ICD-10-CM

## 2023-09-18 DIAGNOSIS — I50.42 CHRONIC COMBINED SYSTOLIC AND DIASTOLIC CONGESTIVE HEART FAILURE: ICD-10-CM

## 2023-09-18 DIAGNOSIS — Z01.810 PREOP CARDIOVASCULAR EXAM: ICD-10-CM

## 2023-09-18 DIAGNOSIS — I87.2 CHRONIC VENOUS INSUFFICIENCY OF LOWER EXTREMITY: ICD-10-CM

## 2023-09-18 DIAGNOSIS — Z79.01 CHRONIC ANTICOAGULATION: ICD-10-CM

## 2023-09-18 PROCEDURE — 1159F PR MEDICATION LIST DOCUMENTED IN MEDICAL RECORD: ICD-10-PCS | Mod: CPTII,S$GLB,, | Performed by: PHYSICIAN ASSISTANT

## 2023-09-18 PROCEDURE — 99214 PR OFFICE/OUTPT VISIT, EST, LEVL IV, 30-39 MIN: ICD-10-PCS | Mod: S$GLB,,, | Performed by: PHYSICIAN ASSISTANT

## 2023-09-18 PROCEDURE — 99999 PR PBB SHADOW E&M-EST. PATIENT-LVL V: ICD-10-PCS | Mod: PBBFAC,,, | Performed by: PHYSICIAN ASSISTANT

## 2023-09-18 PROCEDURE — 4010F PR ACE/ARB THEARPY RXD/TAKEN: ICD-10-PCS | Mod: CPTII,S$GLB,, | Performed by: PHYSICIAN ASSISTANT

## 2023-09-18 PROCEDURE — 1159F MED LIST DOCD IN RCRD: CPT | Mod: CPTII,S$GLB,, | Performed by: PHYSICIAN ASSISTANT

## 2023-09-18 PROCEDURE — 1101F PT FALLS ASSESS-DOCD LE1/YR: CPT | Mod: CPTII,S$GLB,, | Performed by: PHYSICIAN ASSISTANT

## 2023-09-18 PROCEDURE — 3074F PR MOST RECENT SYSTOLIC BLOOD PRESSURE < 130 MM HG: ICD-10-PCS | Mod: CPTII,S$GLB,, | Performed by: PHYSICIAN ASSISTANT

## 2023-09-18 PROCEDURE — 99214 OFFICE O/P EST MOD 30 MIN: CPT | Mod: S$GLB,,, | Performed by: PHYSICIAN ASSISTANT

## 2023-09-18 PROCEDURE — 3008F BODY MASS INDEX DOCD: CPT | Mod: CPTII,S$GLB,, | Performed by: PHYSICIAN ASSISTANT

## 2023-09-18 PROCEDURE — 3288F FALL RISK ASSESSMENT DOCD: CPT | Mod: CPTII,S$GLB,, | Performed by: PHYSICIAN ASSISTANT

## 2023-09-18 PROCEDURE — 3074F SYST BP LT 130 MM HG: CPT | Mod: CPTII,S$GLB,, | Performed by: PHYSICIAN ASSISTANT

## 2023-09-18 PROCEDURE — 3044F HG A1C LEVEL LT 7.0%: CPT | Mod: CPTII,S$GLB,, | Performed by: PHYSICIAN ASSISTANT

## 2023-09-18 PROCEDURE — 3061F PR NEG MICROALBUMINURIA RESULT DOCUMENTED/REVIEW: ICD-10-PCS | Mod: CPTII,S$GLB,, | Performed by: PHYSICIAN ASSISTANT

## 2023-09-18 PROCEDURE — 3066F NEPHROPATHY DOC TX: CPT | Mod: CPTII,S$GLB,, | Performed by: PHYSICIAN ASSISTANT

## 2023-09-18 PROCEDURE — 3008F PR BODY MASS INDEX (BMI) DOCUMENTED: ICD-10-PCS | Mod: CPTII,S$GLB,, | Performed by: PHYSICIAN ASSISTANT

## 2023-09-18 PROCEDURE — 3061F NEG MICROALBUMINURIA REV: CPT | Mod: CPTII,S$GLB,, | Performed by: PHYSICIAN ASSISTANT

## 2023-09-18 PROCEDURE — 3079F DIAST BP 80-89 MM HG: CPT | Mod: CPTII,S$GLB,, | Performed by: PHYSICIAN ASSISTANT

## 2023-09-18 PROCEDURE — 1125F PR PAIN SEVERITY QUANTIFIED, PAIN PRESENT: ICD-10-PCS | Mod: CPTII,S$GLB,, | Performed by: PHYSICIAN ASSISTANT

## 2023-09-18 PROCEDURE — 4010F ACE/ARB THERAPY RXD/TAKEN: CPT | Mod: CPTII,S$GLB,, | Performed by: PHYSICIAN ASSISTANT

## 2023-09-18 PROCEDURE — 3066F PR DOCUMENTATION OF TREATMENT FOR NEPHROPATHY: ICD-10-PCS | Mod: CPTII,S$GLB,, | Performed by: PHYSICIAN ASSISTANT

## 2023-09-18 PROCEDURE — 3044F PR MOST RECENT HEMOGLOBIN A1C LEVEL <7.0%: ICD-10-PCS | Mod: CPTII,S$GLB,, | Performed by: PHYSICIAN ASSISTANT

## 2023-09-18 PROCEDURE — 99999 PR PBB SHADOW E&M-EST. PATIENT-LVL V: CPT | Mod: PBBFAC,,, | Performed by: PHYSICIAN ASSISTANT

## 2023-09-18 PROCEDURE — 3079F PR MOST RECENT DIASTOLIC BLOOD PRESSURE 80-89 MM HG: ICD-10-PCS | Mod: CPTII,S$GLB,, | Performed by: PHYSICIAN ASSISTANT

## 2023-09-18 PROCEDURE — 1101F PR PT FALLS ASSESS DOC 0-1 FALLS W/OUT INJ PAST YR: ICD-10-PCS | Mod: CPTII,S$GLB,, | Performed by: PHYSICIAN ASSISTANT

## 2023-09-18 PROCEDURE — 1125F AMNT PAIN NOTED PAIN PRSNT: CPT | Mod: CPTII,S$GLB,, | Performed by: PHYSICIAN ASSISTANT

## 2023-09-18 PROCEDURE — 3288F PR FALLS RISK ASSESSMENT DOCUMENTED: ICD-10-PCS | Mod: CPTII,S$GLB,, | Performed by: PHYSICIAN ASSISTANT

## 2023-09-18 RX ORDER — POTASSIUM CHLORIDE 20 MEQ/1
40 TABLET, EXTENDED RELEASE ORAL DAILY
Qty: 180 TABLET | Refills: 1 | Status: SHIPPED | OUTPATIENT
Start: 2023-09-18 | End: 2023-12-28 | Stop reason: SDUPTHER

## 2023-09-18 NOTE — PROGRESS NOTES
Subjective:   Patient ID:  Mitch Whittaker is a 67 y.o. male who presents for follow-up of CHF/pre-op clearance      HPI  Mr. Whittaker is a 67 year old male patient whose current medical conditions include combined CHF (EF 40-45%), CKD, DM type II, MARION, HTN, hyperlipidemia, LBBB, DVT (on Eliquis), and ERSD s/p renal transplant who presents today for follow-up. Patient previously seen by myself and Taryn Avila NP and had Lasix increased due to volume overload. He returns today and states he is doing well overall. Edema greatly improved, his weight is down nearly 20 lbs since our last visit. Stable chronic SOB, no worse than his norm. He denies any CP, heaviness, or tightness. No PND/orthopnea. Occasional lightheadedness/dizziness when he gets up too quickly. No near syncope, syncope or falls. Does report elevated HR with exertion.  BP stable and controlled. Patient reports compliance with his medications, only increased his Lasix dose on Saturday and then resumed his normal dose of 40 mg BID. Mindful of his salt and fluid intake. States he has been walking every morning with his daughter for exercise.    BMP from 9/15/23 reviewed/stable. EKG 8/22/23 with PVC's, chronic LBBB, sinus tachycardia. Echo 8/22/23 with low normal EF 50-55%.    Awaiting clearance for [facility].        Review of Systems   Constitutional: Negative for chills, decreased appetite, fever and malaise/fatigue.   HENT:  Negative for congestion, hoarse voice and sore throat.    Eyes:  Negative for blurred vision and discharge.   Cardiovascular:  Positive for dyspnea on exertion (chronic, unchanged) and leg swelling. Negative for chest pain, claudication, cyanosis, irregular heartbeat, near-syncope, orthopnea, palpitations and paroxysmal nocturnal dyspnea.        Elevated HR   Respiratory:  Negative for cough, hemoptysis, shortness of breath, snoring, sputum production and wheezing.    Endocrine: Negative for cold intolerance and heat intolerance.  "  Hematologic/Lymphatic: Negative for bleeding problem. Does not bruise/bleed easily.   Skin:  Negative for rash.   Musculoskeletal:  Positive for arthritis, back pain and neck pain. Negative for joint pain, joint swelling, muscle cramps, muscle weakness and myalgias.   Gastrointestinal:  Negative for abdominal pain, constipation, diarrhea, heartburn, melena and nausea.   Genitourinary:  Negative for hematuria.   Neurological:  Negative for dizziness, focal weakness, headaches, light-headedness, loss of balance, numbness, paresthesias, seizures and weakness.   Psychiatric/Behavioral:  Negative for memory loss. The patient does not have insomnia.    Allergic/Immunologic: Negative for hives.     /84 (BP Location: Left arm, Patient Position: Sitting, BP Method: Large (Manual))   Pulse (!) 120   Ht 5' 7" (1.702 m)   Wt 117.3 kg (258 lb 9.6 oz)   SpO2 96%   BMI 40.50 kg/m²     Objective:   Physical Exam  Vitals and nursing note reviewed.   Constitutional:       General: He is not in acute distress.     Appearance: Normal appearance. He is well-developed. He is obese. He is not diaphoretic.   HENT:      Head: Normocephalic and atraumatic.   Eyes:      General:         Right eye: No discharge.         Left eye: No discharge.      Pupils: Pupils are equal, round, and reactive to light.   Cardiovascular:      Rate and Rhythm: Regular rhythm. Tachycardia present. Occasional Extrasystoles are present.     Heart sounds: Normal heart sounds, S1 normal and S2 normal. No murmur heard.  Pulmonary:      Effort: Pulmonary effort is normal. No respiratory distress.      Breath sounds: Normal breath sounds. No wheezing or rales.   Abdominal:      General: There is no distension.      Palpations: Abdomen is soft.      Tenderness: There is no rebound.   Musculoskeletal:      Right lower leg: Edema (1+) present.      Left lower leg: Edema (1+) present.   Skin:     General: Skin is warm and dry.      Findings: No erythema.      " Comments: CVI changes   Neurological:      General: No focal deficit present.      Mental Status: He is alert and oriented to person, place, and time.   Psychiatric:         Mood and Affect: Mood normal.         Behavior: Behavior normal.         Thought Content: Thought content normal.       Echo Results 8/22/23  Summary         Left Ventricle: The left ventricle is normal in size. Moderately increased ventricular mass. Moderately increased wall thickness. Normal wall motion. There is low normal systolic function with a visually estimated ejection fraction of 50 - 55%. There is normal diastolic function.    Left Atrium: Left atrium is severely dilated.    Right Ventricle: Normal right ventricular cavity size. Wall thickness is normal. Right ventricle wall motion  is normal. Systolic function is normal.    Right Atrium: Right atrium is dilated.    IVC/SVC: Normal venous pressure at 3 mmHg.      Chemistry        Component Value Date/Time     (H) 09/15/2023 0937    K 4.1 09/15/2023 0937     09/15/2023 0937    CO2 27 09/15/2023 0937    BUN 45 (H) 09/15/2023 0937    CREATININE 2.4 (H) 09/15/2023 0937     (H) 09/15/2023 0937        Component Value Date/Time    CALCIUM 9.4 09/15/2023 0937    ALKPHOS 53 (L) 09/01/2023 1349    AST 18 09/01/2023 1349    ALT 15 09/01/2023 1349    BILITOT 0.5 09/01/2023 1349    ESTGFRAFRICA 22.7 (A) 07/06/2022 0855    EGFRNONAA 19.6 (A) 07/06/2022 0855          Echo Results  Summary         Left Ventricle: The left ventricle is normal in size. Moderately increased ventricular mass. Moderately increased wall thickness. Normal wall motion. There is low normal systolic function with a visually estimated ejection fraction of 50 - 55%. There is normal diastolic function.    Left Atrium: Left atrium is severely dilated.    Right Ventricle: Normal right ventricular cavity size. Wall thickness is normal. Right ventricle wall motion  is normal. Systolic function is normal.    Right  Atrium: Right atrium is dilated.    IVC/SVC: Normal venous pressure at 3 mmHg.     Assessment:      1. Acute on chronic diastolic congestive heart failure    2. Chronic venous insufficiency of lower extremity    3. Coronary artery disease of native artery of native heart with stable angina pectoris    4. LBBB (left bundle branch block)    5. Living-related donor kidney transplant (daughter) - 6/12/15    6. Morbid obesity with BMI of 40.0-44.9, adult    7. Class 3 severe obesity due to excess calories with serious comorbidity and body mass index (BMI) of 40.0 to 44.9 in adult    8. Obstructive sleep apnea    9. Preop cardiovascular exam    10. Chronic anticoagulation    11. Acute deep vein thrombosis (DVT) of other specified vein of left lower extremity    12. Severe obesity (BMI >= 40)      Patient presents for f/u. Doing well overall. Volume status much improved, down nearly 20 lbs since my last visit with him. No unusual SOB. No CP. Ambulating every AM for exercise. Recent BMP with stable kidney function. Continue same meds/mgmt. Does report elevated HR at home, will further discuss with Dr. Muhammad. Surgical clearance pending discussion.   Plan:  -Continue current medical management and risk factor modification  -Cardiac, low salt diet  -Weight loss  -Recent BMP reviewed/discussed  -Recent echo/EKG reviewed/discussed  -Will discuss surgical clearance with Dr. Muhammad  -Follow-up with Dr. Muhammad in 3 months  -Follow-up with Dr. Gonsalez/nephology as scheduled

## 2023-09-20 ENCOUNTER — TELEPHONE (OUTPATIENT)
Dept: CARDIOLOGY | Facility: CLINIC | Age: 68
End: 2023-09-20
Payer: COMMERCIAL

## 2023-09-20 DIAGNOSIS — I25.118 CORONARY ARTERY DISEASE OF NATIVE ARTERY OF NATIVE HEART WITH STABLE ANGINA PECTORIS: ICD-10-CM

## 2023-09-20 DIAGNOSIS — I50.42 CHRONIC COMBINED SYSTOLIC AND DIASTOLIC CONGESTIVE HEART FAILURE: ICD-10-CM

## 2023-09-20 DIAGNOSIS — I44.7 LBBB (LEFT BUNDLE BRANCH BLOCK): ICD-10-CM

## 2023-09-20 DIAGNOSIS — I49.3 PVC'S (PREMATURE VENTRICULAR CONTRACTIONS): ICD-10-CM

## 2023-09-20 DIAGNOSIS — R00.0 TACHYCARDIA: ICD-10-CM

## 2023-09-20 DIAGNOSIS — Z01.810 PREOP CARDIOVASCULAR EXAM: Primary | ICD-10-CM

## 2023-09-20 NOTE — TELEPHONE ENCOUNTER
Please phone patient. Discussed with Dr. Muhammad. Needs 48 hour holter and MPI stress test. Orders placed. Schedule ASAP for pre-op evaluation.    Thanks.

## 2023-09-20 NOTE — TELEPHONE ENCOUNTER
Contacted pt and informed him that CARLTON Cates discussed w/ Dr. Muhammad and want to get him scheduled for a 48 hour holter monitor and MPI stress test, and someone would be contacting him to get that scheduled soon. Pt vu w/o q/c      --- CARLTON Pierson 09/20/23 03:24 PM ---  Please phone patient. Discussed with Dr. Muhammad. Needs 48 hour holter and MPI stress test. Orders placed. Schedule ASAP for pre-op evaluation.    Thanks.

## 2023-09-20 NOTE — TELEPHONE ENCOUNTER
Please phone patient. Discussed with Dr. Muhammad. Needs 48 hour hotler and MPI stress test.     Order placed, please schedule ASAP for pre-op.    Thanks

## 2023-09-25 DIAGNOSIS — Z94.0 KIDNEY REPLACED BY TRANSPLANT: Primary | ICD-10-CM

## 2023-09-25 DIAGNOSIS — E55.9 VITAMIN D DEFICIENCY: ICD-10-CM

## 2023-09-29 ENCOUNTER — TELEPHONE (OUTPATIENT)
Dept: CARDIOLOGY | Facility: CLINIC | Age: 68
End: 2023-09-29
Payer: COMMERCIAL

## 2023-09-29 ENCOUNTER — TELEPHONE (OUTPATIENT)
Dept: PREADMISSION TESTING | Facility: HOSPITAL | Age: 68
End: 2023-09-29
Payer: COMMERCIAL

## 2023-09-29 NOTE — TELEPHONE ENCOUNTER
----- Message from Bina Hope MA sent at 9/28/2023  3:50 PM CDT -----  Regarding: FW: surgery clearance  Can you assist with trying to get pt in sooner for nuc stress test?    ----- Message -----  From: Lolly Huertas MA  Sent: 9/28/2023   3:47 PM CDT  To: Nu Flores RN; Salvador Collazo Staff  Subject: FW: surgery clearance                            Per Dr. Almonte is there anyway that we can get this patient in sooner for a stress test since the surgery is scheduled on 10/6/2023 which is before when the stress test is scheduled for on 10/10/2023??     Please and thank you so much in advance.   ----- Message -----  From: Nu Flores RN  Sent: 9/28/2023  10:34 AM CDT  To: Suzy Cohen Staff  Subject: surgery clearance                                Good morning, pt will need stress test done before cardiology will clear him for surgery. Stress test is scheduled on 10/10/23. Surgery will need to be rescheduled if pt cannot get stress test done before 10/6/23. Please advise. Thanks.    -Surgery Pre Admit  [phone]

## 2023-09-29 NOTE — TELEPHONE ENCOUNTER
----- Message from Lolly Huertas MA sent at 9/28/2023  3:44 PM CDT -----  Regarding: FW: surgery clearance  Per Dr. Almonte is there anyway that we can get this patient in sooner for a stress test since the surgery is scheduled on 10/6/2023 which is before when the stress test is scheduled for on 10/10/2023??     Please and thank you so much in advance.   ----- Message -----  From: Nu Flores RN  Sent: 9/28/2023  10:34 AM CDT  To: Suzy Cohen Staff  Subject: surgery clearance                                Good morning, pt will need stress test done before cardiology will clear him for surgery. Stress test is scheduled on 10/10/23. Surgery will need to be rescheduled if pt cannot get stress test done before 10/6/23. Please advise. Thanks.    -Surgery Pre Admit  [phone]

## 2023-09-29 NOTE — TELEPHONE ENCOUNTER
Staff message sent to Pre-Admit Testing    Per Dr. Almonte is there anyway that we can get this patient in sooner for a stress test since the surgery is scheduled on 10/6/2023 which is before when the stress test is scheduled for on 10/10/2023??Checked with scheduling... The next available is the end of October.

## 2023-10-04 ENCOUNTER — TELEPHONE (OUTPATIENT)
Dept: ORTHOPEDICS | Facility: CLINIC | Age: 68
End: 2023-10-04
Payer: COMMERCIAL

## 2023-10-04 RX ORDER — VALGANCICLOVIR 450 MG/1
450 TABLET, FILM COATED ORAL EVERY OTHER DAY
Qty: 45 TABLET | Refills: 0 | OUTPATIENT
Start: 2023-10-04 | End: 2024-10-03

## 2023-10-04 NOTE — TELEPHONE ENCOUNTER
Called and spoke with patient - he has not been cleared by cards at this time and has to get additional cardiac testing done -- all preop and post op / surgery has been canceled until patient is cleared     Patient verbalized understanding and thankful for call

## 2023-10-05 ENCOUNTER — LAB VISIT (OUTPATIENT)
Dept: LAB | Facility: HOSPITAL | Age: 68
End: 2023-10-05
Attending: INTERNAL MEDICINE
Payer: COMMERCIAL

## 2023-10-05 DIAGNOSIS — Z94.0 KIDNEY TRANSPLANTED: ICD-10-CM

## 2023-10-05 LAB
ALBUMIN SERPL BCP-MCNC: 2.8 G/DL (ref 3.5–5.2)
ANION GAP SERPL CALC-SCNC: 12 MMOL/L (ref 8–16)
BASOPHILS # BLD AUTO: 0.02 K/UL (ref 0–0.2)
BASOPHILS NFR BLD: 0.2 % (ref 0–1.9)
BUN SERPL-MCNC: 37 MG/DL (ref 8–23)
CALCIUM SERPL-MCNC: 9.8 MG/DL (ref 8.7–10.5)
CHLORIDE SERPL-SCNC: 101 MMOL/L (ref 95–110)
CO2 SERPL-SCNC: 25 MMOL/L (ref 23–29)
CREAT SERPL-MCNC: 2.3 MG/DL (ref 0.5–1.4)
DIFFERENTIAL METHOD: ABNORMAL
EOSINOPHIL # BLD AUTO: 0 K/UL (ref 0–0.5)
EOSINOPHIL NFR BLD: 0.2 % (ref 0–8)
ERYTHROCYTE [DISTWIDTH] IN BLOOD BY AUTOMATED COUNT: 14.7 % (ref 11.5–14.5)
EST. GFR  (NO RACE VARIABLE): 30 ML/MIN/1.73 M^2
GLUCOSE SERPL-MCNC: 188 MG/DL (ref 70–110)
HCT VFR BLD AUTO: 34.8 % (ref 40–54)
HGB BLD-MCNC: 9.8 G/DL (ref 14–18)
IMM GRANULOCYTES # BLD AUTO: 0.14 K/UL (ref 0–0.04)
IMM GRANULOCYTES NFR BLD AUTO: 1.1 % (ref 0–0.5)
LYMPHOCYTES # BLD AUTO: 0.8 K/UL (ref 1–4.8)
LYMPHOCYTES NFR BLD: 6 % (ref 18–48)
MCH RBC QN AUTO: 24.7 PG (ref 27–31)
MCHC RBC AUTO-ENTMCNC: 28.2 G/DL (ref 32–36)
MCV RBC AUTO: 88 FL (ref 82–98)
MONOCYTES # BLD AUTO: 1.4 K/UL (ref 0.3–1)
MONOCYTES NFR BLD: 10.9 % (ref 4–15)
NEUTROPHILS # BLD AUTO: 10.4 K/UL (ref 1.8–7.7)
NEUTROPHILS NFR BLD: 81.6 % (ref 38–73)
NRBC BLD-RTO: 0 /100 WBC
PHOSPHATE SERPL-MCNC: 2.9 MG/DL (ref 2.7–4.5)
PLATELET # BLD AUTO: 173 K/UL (ref 150–450)
PMV BLD AUTO: 12.1 FL (ref 9.2–12.9)
POTASSIUM SERPL-SCNC: 4.3 MMOL/L (ref 3.5–5.1)
RBC # BLD AUTO: 3.97 M/UL (ref 4.6–6.2)
SODIUM SERPL-SCNC: 138 MMOL/L (ref 136–145)
WBC # BLD AUTO: 12.72 K/UL (ref 3.9–12.7)

## 2023-10-05 PROCEDURE — 85025 COMPLETE CBC W/AUTO DIFF WBC: CPT | Performed by: INTERNAL MEDICINE

## 2023-10-05 PROCEDURE — 87799 DETECT AGENT NOS DNA QUANT: CPT | Performed by: INTERNAL MEDICINE

## 2023-10-05 PROCEDURE — 80197 ASSAY OF TACROLIMUS: CPT | Performed by: INTERNAL MEDICINE

## 2023-10-05 PROCEDURE — 80069 RENAL FUNCTION PANEL: CPT | Performed by: INTERNAL MEDICINE

## 2023-10-05 PROCEDURE — 36415 COLL VENOUS BLD VENIPUNCTURE: CPT | Performed by: INTERNAL MEDICINE

## 2023-10-06 LAB
CMV DNA SPEC QL NAA+PROBE: ABNORMAL
CYTOMEGALOVIRUS LOG (IU/ML): 1.88 LOGIU/ML
CYTOMEGALOVIRUS PCR, QUANT: 76 IU/ML
TACROLIMUS BLD-MCNC: 4.7 NG/ML (ref 5–15)

## 2023-10-10 ENCOUNTER — HOSPITAL ENCOUNTER (OUTPATIENT)
Dept: CARDIOLOGY | Facility: HOSPITAL | Age: 68
Discharge: HOME | End: 2023-10-10
Attending: PHYSICIAN ASSISTANT
Payer: COMMERCIAL

## 2023-10-10 ENCOUNTER — HOSPITAL ENCOUNTER (OUTPATIENT)
Dept: RADIOLOGY | Facility: HOSPITAL | Age: 68
Discharge: HOME | End: 2023-10-10
Attending: PHYSICIAN ASSISTANT
Payer: COMMERCIAL

## 2023-10-10 DIAGNOSIS — I49.3 PVC'S (PREMATURE VENTRICULAR CONTRACTIONS): ICD-10-CM

## 2023-10-10 DIAGNOSIS — R00.0 TACHYCARDIA: ICD-10-CM

## 2023-10-10 DIAGNOSIS — I50.42 CHRONIC COMBINED SYSTOLIC AND DIASTOLIC CONGESTIVE HEART FAILURE: ICD-10-CM

## 2023-10-10 DIAGNOSIS — I44.7 LBBB (LEFT BUNDLE BRANCH BLOCK): ICD-10-CM

## 2023-10-10 DIAGNOSIS — Z01.810 PREOP CARDIOVASCULAR EXAM: ICD-10-CM

## 2023-10-10 DIAGNOSIS — I25.118 CORONARY ARTERY DISEASE OF NATIVE ARTERY OF NATIVE HEART WITH STABLE ANGINA PECTORIS: ICD-10-CM

## 2023-10-10 LAB
CV STRESS BASE HR: 85 BPM
DIASTOLIC BLOOD PRESSURE: 80 MMHG
NUC REST EJECTION FRACTION: 41
NUC STRESS EJECTION FRACTION: 54 %
OHS CV CPX 85 PERCENT MAX PREDICTED HEART RATE MALE: 128
OHS CV CPX MAX PREDICTED HEART RATE: 151
OHS CV CPX PATIENT IS FEMALE: 0
OHS CV CPX PATIENT IS MALE: 1
OHS CV CPX PEAK DIASTOLIC BLOOD PRESSURE: 76 MMHG
OHS CV CPX PEAK HEAR RATE: 109 BPM
OHS CV CPX PEAK RATE PRESSURE PRODUCT: NORMAL
OHS CV CPX PEAK SYSTOLIC BLOOD PRESSURE: 140 MMHG
OHS CV CPX PERCENT MAX PREDICTED HEART RATE ACHIEVED: 72
OHS CV CPX RATE PRESSURE PRODUCT PRESENTING: NORMAL
SYSTOLIC BLOOD PRESSURE: 173 MMHG

## 2023-10-10 PROCEDURE — 93018 NUCLEAR STRESS - CARDIOLOGY INTERPRETED (CUPID ONLY): ICD-10-PCS | Mod: ,,, | Performed by: INTERNAL MEDICINE

## 2023-10-10 PROCEDURE — 93227 XTRNL ECG REC<48 HR R&I: CPT | Mod: ,,, | Performed by: STUDENT IN AN ORGANIZED HEALTH CARE EDUCATION/TRAINING PROGRAM

## 2023-10-10 PROCEDURE — 63600175 PHARM REV CODE 636 W HCPCS: Performed by: PHYSICIAN ASSISTANT

## 2023-10-10 PROCEDURE — A9502 TC99M TETROFOSMIN: HCPCS

## 2023-10-10 PROCEDURE — 93018 CV STRESS TEST I&R ONLY: CPT | Mod: ,,, | Performed by: INTERNAL MEDICINE

## 2023-10-10 PROCEDURE — 78452 NUCLEAR STRESS - CARDIOLOGY INTERPRETED (CUPID ONLY): ICD-10-PCS | Mod: 26,,, | Performed by: INTERNAL MEDICINE

## 2023-10-10 PROCEDURE — 93225 XTRNL ECG REC<48 HRS REC: CPT

## 2023-10-10 PROCEDURE — 78452 HT MUSCLE IMAGE SPECT MULT: CPT

## 2023-10-10 PROCEDURE — 93227 HOLTER MONITOR - 48 HOUR (CUPID ONLY): ICD-10-PCS | Mod: ,,, | Performed by: STUDENT IN AN ORGANIZED HEALTH CARE EDUCATION/TRAINING PROGRAM

## 2023-10-10 PROCEDURE — 93016 NUCLEAR STRESS - CARDIOLOGY INTERPRETED (CUPID ONLY): ICD-10-PCS | Mod: ,,, | Performed by: INTERNAL MEDICINE

## 2023-10-10 PROCEDURE — 78452 HT MUSCLE IMAGE SPECT MULT: CPT | Mod: 26,,, | Performed by: INTERNAL MEDICINE

## 2023-10-10 PROCEDURE — 93017 CV STRESS TEST TRACING ONLY: CPT

## 2023-10-10 PROCEDURE — 93016 CV STRESS TEST SUPVJ ONLY: CPT | Mod: ,,, | Performed by: INTERNAL MEDICINE

## 2023-10-10 RX ORDER — REGADENOSON 0.08 MG/ML
0.4 INJECTION, SOLUTION INTRAVENOUS ONCE
Status: COMPLETED | OUTPATIENT
Start: 2023-10-10 | End: 2023-10-10

## 2023-10-10 RX ADMIN — REGADENOSON 0.4 MG: 0.08 INJECTION, SOLUTION INTRAVENOUS at 12:10

## 2023-10-11 ENCOUNTER — TELEPHONE (OUTPATIENT)
Dept: CARDIOLOGY | Facility: HOSPITAL | Age: 68
End: 2023-10-11
Payer: COMMERCIAL

## 2023-10-11 NOTE — TELEPHONE ENCOUNTER
Notified the patient                      Please phone patient. MPI stress test is negative for ischemia. Awaiting Holter r

## 2023-10-11 NOTE — TELEPHONE ENCOUNTER
Please phone patient. MPI stress test is negative for ischemia. Awaiting Holter results.    Thanks

## 2023-10-12 ENCOUNTER — OFFICE VISIT (OUTPATIENT)
Dept: NEPHROLOGY | Facility: CLINIC | Age: 68
End: 2023-10-12
Payer: COMMERCIAL

## 2023-10-12 VITALS
WEIGHT: 261.69 LBS | HEART RATE: 106 BPM | HEIGHT: 67 IN | SYSTOLIC BLOOD PRESSURE: 102 MMHG | DIASTOLIC BLOOD PRESSURE: 54 MMHG | BODY MASS INDEX: 41.07 KG/M2

## 2023-10-12 DIAGNOSIS — Z94.0 KIDNEY REPLACED BY TRANSPLANT: ICD-10-CM

## 2023-10-12 PROCEDURE — 1101F PR PT FALLS ASSESS DOC 0-1 FALLS W/OUT INJ PAST YR: ICD-10-PCS | Mod: CPTII,S$GLB,, | Performed by: INTERNAL MEDICINE

## 2023-10-12 PROCEDURE — 1159F MED LIST DOCD IN RCRD: CPT | Mod: CPTII,S$GLB,, | Performed by: INTERNAL MEDICINE

## 2023-10-12 PROCEDURE — 3288F PR FALLS RISK ASSESSMENT DOCUMENTED: ICD-10-PCS | Mod: CPTII,S$GLB,, | Performed by: INTERNAL MEDICINE

## 2023-10-12 PROCEDURE — 3061F NEG MICROALBUMINURIA REV: CPT | Mod: CPTII,S$GLB,, | Performed by: INTERNAL MEDICINE

## 2023-10-12 PROCEDURE — 3044F HG A1C LEVEL LT 7.0%: CPT | Mod: CPTII,S$GLB,, | Performed by: INTERNAL MEDICINE

## 2023-10-12 PROCEDURE — 4010F PR ACE/ARB THEARPY RXD/TAKEN: ICD-10-PCS | Mod: CPTII,S$GLB,, | Performed by: INTERNAL MEDICINE

## 2023-10-12 PROCEDURE — 1101F PT FALLS ASSESS-DOCD LE1/YR: CPT | Mod: CPTII,S$GLB,, | Performed by: INTERNAL MEDICINE

## 2023-10-12 PROCEDURE — 3074F SYST BP LT 130 MM HG: CPT | Mod: CPTII,S$GLB,, | Performed by: INTERNAL MEDICINE

## 2023-10-12 PROCEDURE — 3078F PR MOST RECENT DIASTOLIC BLOOD PRESSURE < 80 MM HG: ICD-10-PCS | Mod: CPTII,S$GLB,, | Performed by: INTERNAL MEDICINE

## 2023-10-12 PROCEDURE — 3061F PR NEG MICROALBUMINURIA RESULT DOCUMENTED/REVIEW: ICD-10-PCS | Mod: CPTII,S$GLB,, | Performed by: INTERNAL MEDICINE

## 2023-10-12 PROCEDURE — 3074F PR MOST RECENT SYSTOLIC BLOOD PRESSURE < 130 MM HG: ICD-10-PCS | Mod: CPTII,S$GLB,, | Performed by: INTERNAL MEDICINE

## 2023-10-12 PROCEDURE — 1125F AMNT PAIN NOTED PAIN PRSNT: CPT | Mod: CPTII,S$GLB,, | Performed by: INTERNAL MEDICINE

## 2023-10-12 PROCEDURE — 4010F ACE/ARB THERAPY RXD/TAKEN: CPT | Mod: CPTII,S$GLB,, | Performed by: INTERNAL MEDICINE

## 2023-10-12 PROCEDURE — 3008F PR BODY MASS INDEX (BMI) DOCUMENTED: ICD-10-PCS | Mod: CPTII,S$GLB,, | Performed by: INTERNAL MEDICINE

## 2023-10-12 PROCEDURE — 99999 PR PBB SHADOW E&M-EST. PATIENT-LVL V: CPT | Mod: PBBFAC,,, | Performed by: INTERNAL MEDICINE

## 2023-10-12 PROCEDURE — 99215 PR OFFICE/OUTPT VISIT, EST, LEVL V, 40-54 MIN: ICD-10-PCS | Mod: S$GLB,,, | Performed by: INTERNAL MEDICINE

## 2023-10-12 PROCEDURE — 1159F PR MEDICATION LIST DOCUMENTED IN MEDICAL RECORD: ICD-10-PCS | Mod: CPTII,S$GLB,, | Performed by: INTERNAL MEDICINE

## 2023-10-12 PROCEDURE — 99215 OFFICE O/P EST HI 40 MIN: CPT | Mod: S$GLB,,, | Performed by: INTERNAL MEDICINE

## 2023-10-12 PROCEDURE — 3078F DIAST BP <80 MM HG: CPT | Mod: CPTII,S$GLB,, | Performed by: INTERNAL MEDICINE

## 2023-10-12 PROCEDURE — 3288F FALL RISK ASSESSMENT DOCD: CPT | Mod: CPTII,S$GLB,, | Performed by: INTERNAL MEDICINE

## 2023-10-12 PROCEDURE — 99999 PR PBB SHADOW E&M-EST. PATIENT-LVL V: ICD-10-PCS | Mod: PBBFAC,,, | Performed by: INTERNAL MEDICINE

## 2023-10-12 PROCEDURE — 3008F BODY MASS INDEX DOCD: CPT | Mod: CPTII,S$GLB,, | Performed by: INTERNAL MEDICINE

## 2023-10-12 PROCEDURE — 1125F PR PAIN SEVERITY QUANTIFIED, PAIN PRESENT: ICD-10-PCS | Mod: CPTII,S$GLB,, | Performed by: INTERNAL MEDICINE

## 2023-10-12 PROCEDURE — 3066F PR DOCUMENTATION OF TREATMENT FOR NEPHROPATHY: ICD-10-PCS | Mod: CPTII,S$GLB,, | Performed by: INTERNAL MEDICINE

## 2023-10-12 PROCEDURE — 3044F PR MOST RECENT HEMOGLOBIN A1C LEVEL <7.0%: ICD-10-PCS | Mod: CPTII,S$GLB,, | Performed by: INTERNAL MEDICINE

## 2023-10-12 PROCEDURE — 3066F NEPHROPATHY DOC TX: CPT | Mod: CPTII,S$GLB,, | Performed by: INTERNAL MEDICINE

## 2023-10-12 RX ORDER — VALGANCICLOVIR 450 MG/1
450 TABLET, FILM COATED ORAL EVERY OTHER DAY
Qty: 45 TABLET | Refills: 1 | Status: SHIPPED | OUTPATIENT
Start: 2023-10-12 | End: 2024-02-01 | Stop reason: SINTOL

## 2023-10-12 RX ORDER — TACROLIMUS 1 MG/1
5 CAPSULE ORAL EVERY 12 HOURS
Qty: 270 CAPSULE | Refills: 11 | Status: SHIPPED | OUTPATIENT
Start: 2023-10-12 | End: 2023-10-13 | Stop reason: SDUPTHER

## 2023-10-12 NOTE — PROGRESS NOTES
Renal clinic f/u note:  Date of clinic visit: 10/12/23  Reason for f/u and chief c/o: h/o of kidney transplant. CHF, CKD. Recent diarrhea and positive CMV titer     HPI: Pt is a 68 y/o male with h/o of living related kidney transplant from his daughter in 2015 who presents for f/u. 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 He was last seen in renal clinic in June 2023. Chart was reviewed. Labs, meds, immunosuppressive meds and doses reviewed with pt. Pt also saw kidney transplant in [place] in May 2023.    On f/u today, pt has no new c/o's, no new issus, but surveillance CMV titer is again positive after 3 negative results. Pt denies having any GI issues, no diarrhea, no abdominal pain. His CMV issues will be reviewed as follows:     To review, pt was admitted to [facility] for ANGELITO (Cr 4.3) and diarrhea in Dec 2022. The cause for the latter was not clear. He did not have colonoscopy. CMV titer by PCR was positive and he was presumably treated for CMV colitis, inially with ganciclovir, then valcyte x 21 days on discharge. Diarrhea resolved right away after starting the anti-viral meds. Pt was d/koki on valcyte and took it x 21 days. CMV titer turned positive again in April 2023. Valcyte was re-started with renal dose adjustment. Pt has been taking it. Pt is now s/p colonoscopy on 6/7/23, which found no sign of viral induced inflammation. Valcyte was stopped in July 2023.     Pt also has a h/o of CHF. Pt denies SOB, has leg swelling. Noted lasix was recently increased by cardiology from 40 mg bid to 80 mg am and 40 mg pm. Noted pt has mild hypotension, denies lightheadedness.         PAST MEDICAL HISTORY: living related kidney transplant form daughter 6/15/2015 (on HD pre-transplant x 2.5 years), CKD stage 3 to 4, DM-2, HTN, CAD (coronary artery disease), combined diastolic and systolic (EF 40%) CHF, Chronic venous insufficiency of lower extremity, Diabetic retinopathy,  "Gallbladder polyp, 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, History of colon polyps, HPTH (hyperparathyroidism), Hyperlipidemia, LBBB (left bundle branch block) (12/20/2021), Morbid obesity with BMI of 45.0-49.9, adult, PCO (posterior capsular opacification), left (3/4/2019), Sleep apnea,     PAST SURGICAL HISTORY:  He  has a past surgical history that includes Retinal laser procedure; Cardiac catheterization (2008); Kidney transplant; and Colonoscopy (N/A, 4/5/2018).     SOCIAL HISTORY:  He  reports that he quit smoking about 8 years ago. He quit smokeless tobacco use about 8 years ago. He reports that he does not drink alcohol and does not use drugs.     FAMILY MEDICAL HISTORY:  His family history includes Diabetes in his maternal grandmother, mother, and sister; Heart failure in his mother; Hypertension in his mother; Kidney disease in his sister.               Review of patient's allergies indicates:   Allergen Reactions    Lisinopril Other (See Comments)       Other reaction(s):  cough    Actos  [pioglitazone]         Other reaction(s): chf    Metformin         Other reaction(s): renal insuff  Other reaction(s): chf      Meds reviewed  Current Outpatient Medications:     amitriptyline (ELAVIL) 25 MG tablet, Take 1 tablet (25 mg total) by mouth nightly as needed for Insomnia., Disp: 30 tablet, Rfl: 2    apixaban (ELIQUIS) 5 mg Tab, Take 1 tablet (5 mg total) by mouth 2 (two) times daily., Disp: 180 tablet, Rfl: 1    aspirin (ECOTRIN) 81 MG EC tablet, Take 1 tablet by mouth Daily. , Disp: , Rfl:     BD ULTRA-FINE MINI PEN NEEDLE 31 gauge x 3/16" Ndle, Use to inject insulin as needed up to 4 times daily, Disp: 100 each, Rfl: 3    blood sugar diagnostic (CONTOUR NEXT TEST STRIPS) Strp, by Misc.(Non-Drug; Combo Route) route 4 (four) times daily before meals and nightly., Disp: 100 each, Rfl: 1    blood-glucose meter (FREESTYLE LITE METER) kit, 1 each 4 (four) times daily., Disp: 1 each, Rfl: 0    calcitRIOL " (ROCALTROL) 0.25 MCG Cap, Take 1 capsule (0.25 mcg total) by mouth once daily., Disp: 30 capsule, Rfl: 11    famotidine (PEPCID) 20 MG tablet, TAKE ONE TABLET BY MOUTH EVERY EVENING, Disp: 30 tablet, Rfl: 11    fish oil-omega-3 fatty acids 300-1,000 mg capsule, Take 1 g by mouth once daily., Disp: , Rfl:     furosemide (LASIX) 80 MG tablet, Take one-half tablet (40 mg) by mouth 2 (two) times daily., Disp: 30 tablet, Rfl: 11    gabapentin (NEURONTIN) 100 MG capsule, Take 1 capsule (100 mg total) by mouth 3 (three) times daily., Disp: 90 capsule, Rfl: 11    glimepiride (AMARYL) 2 MG tablet, Take 1 tablet (2 mg total) by mouth before breakfast., Disp: 90 tablet, Rfl: 3    insulin (LANTUS SOLOSTAR U-100 INSULIN) glargine 100 units/mL SubQ pen, Inject 35 Units into the skin 2 (two) times daily., Disp: 60 mL, Rfl: 0    insulin aspart U-100 (NOVOLOG FLEXPEN U-100 INSULIN) 100 unit/mL (3 mL) InPn pen, Inject 25 Units into the skin 3 (three) times daily with meals., Disp: 30 mL, Rfl: 2    ketoconazole (NIZORAL) 200 mg Tab, Take HALF A tablet (100 mg total) by mouth once daily., Disp: 15 tablet, Rfl: 9    lancets (MICROLET LANCET) Misc, 100 lancets by Misc.(Non-Drug; Combo Route) route 4 (four) times daily before meals and nightly., Disp: 100 each, Rfl: 11    magnesium oxide (MAG-OX) 400 mg (241.3 mg magnesium) tablet, Take 1 tablet (400 mg total) by mouth once daily., Disp: 90 tablet, Rfl: 2    metoprolol succinate (TOPROL-XL) 25 MG 24 hr tablet, Take 1 tablet (25 mg total) by mouth once daily., Disp: 90 tablet, Rfl: 1    montelukast (SINGULAIR) 10 mg tablet, Take 1 tablet (10 mg total) by mouth every evening., Disp: 90 tablet, Rfl: 0    multivitamin (THERAGRAN) tablet, Take 1 tablet by mouth once daily., Disp: , Rfl:     mycophenolate (CELLCEPT) 250 mg Cap, Take 2 capsules (500 mg total) by mouth 2 (two) times daily., Disp: 120 capsule, Rfl: 11    ondansetron (ZOFRAN) 4 MG tablet, Take 1 tablet (4 mg total) by mouth every 6  "(six) hours., Disp: 30 tablet, Rfl: 0    pen needle, diabetic (BD INSULIN PEN NEEDLE UF SHORT) 31 gauge x 5/16" Ndle, USE TO INJECT INSULIN TWICE A DAY, Disp: 200 each, Rfl: 5    potassium chloride SA (K-DUR,KLOR-CON) 20 MEQ tablet, Take 2 tablets (40 mEq total) by mouth once daily., Disp: 180 tablet, Rfl: 1    predniSONE (DELTASONE) 5 MG tablet, Take 1 tablet (5 mg total) by mouth once daily., Disp: 30 tablet, Rfl: 11    promethazine-dextromethorphan (PROMETHAZINE-DM) 6.25-15 mg/5 mL Syrp, Take 5 mLs by mouth every 6 (six) hours as needed (cough)., Disp: 180 mL, Rfl: 0    rosuvastatin (CRESTOR) 40 MG Tab, Take 1 tablet (40 mg total) by mouth once daily in the evening., Disp: 90 tablet, Rfl: 1    sacubitriL-valsartan (ENTRESTO)  mg per tablet, Take 1 tablet by mouth 2 (two) times daily., Disp: 180 tablet, Rfl: 1    tamsulosin (FLOMAX) 0.4 mg Cap, Take 1 capsule (0.4 mg total) by mouth once daily., Disp: 30 capsule, Rfl: 11    traMADoL (ULTRAM) 50 mg tablet, Take 1 tablet (50 mg total) by mouth every 4 (four) hours as needed for Pain., Disp: 30 tablet, Rfl: 0    triamcinolone acetonide 0.1% (KENALOG) 0.1 % ointment, Apply topically 2 (two) times daily. Use on bilateral lower legs., Disp: 454 g, Rfl: 1    tacrolimus (PROGRAF) 1 MG Cap, T4 mg po bid       REVIEW OF SYSTEMS:  Patient has no fever, fatigue, visual changes, chest pain, edema, cough, dyspnea, nausea, vomiting, constipation, diarrhea, arthralgias, pruritis, dizziness, weakness, depression, confusion.     PHYSICAL EXAM:  Blood pressure 102/54, pulse 90, resp. rate 20, weight 261 lbs, from 279 lbs, from 271 lbs, from 287 lbs  Gen:    WDWN male in no apparent distress  Psych: Normal mood and affect  Skin:    No rashes or ulcers  Neck:   No JVD  Chest:  Clear with no rales, rhonchi, wheezing with normal effort  CV:      Regular with no murmurs, gallops or rubs  Abd:     Soft, nontender, no distension  Ext:      1+ edema, from 2 and 3+ previously     Labs " reviewed  BMP  Lab Results   Component Value Date     10/05/2023    K 4.3 10/05/2023     10/05/2023    CO2 25 10/05/2023    BUN 37 (H) 10/05/2023    CREATININE 2.3 (H) 10/05/2023    CALCIUM 9.8 10/05/2023    ANIONGAP 12 10/05/2023    EGFRNORACEVR 30.0 (A) 10/05/2023     Lab Results   Component Value Date    WBC 12.72 (H) 10/05/2023    HGB 9.8 (L) 10/05/2023    HCT 34.8 (L) 10/05/2023    MCV 88 10/05/2023     10/05/2023       Lab Results   Component Value Date    .5 (H) 05/17/2023    CALCIUM 9.8 10/05/2023    PHOS 2.9 10/05/2023     Quant <50 IU/mL 76 Abnormal   Not Detected  Not Detected  Not Detected  78 Abnormal   Not Detected  <50 Abnormal     Cytomegalovirus DNA Not Detected CMV DNA detected and quantified Abnormal   Not Detected  Not Detected  Not Detected   Not Detected  Detected Abnormal     Cytomegalovirus Log (IU/mL) <1.70 LogIU/mL 1.88 Abnormal   Not Detected CM  Not Detected CM  Not Detected CM  1.89 Abnormal  CM  Not Detected CM  <1.70 Abnormal              Prograf level 4.7           Cardiac study: 1/18/22 reviewed:  Conclusion  Planar imaging demonstrates cardiac activity that is absent to that of the adjacent rib cage.  Study is negative for TTR amyloidosis with an uptake ratio of 1.03.  Perugini Score of 0     Echo 7/27/21 reviewed:  The left ventricle is normal in size with concentric hypertrophy and mildly decreased systolic function.  The estimated ejection fraction is 40%.  Normal right ventricular size with normal right ventricular systolic function.  Indeterminate left ventricular diastolic function.  Moderate left atrial enlargement.           IMPRESSION AND RECOMMENDATIONS:  68 y/o male with h/o of kidney transplant present for f/u     1. Renal: s Cr stable, within baseline range  Stable renal function. CKD stage 4  Prior ANGELITO, due to diarrhea, has resolved     Cr tends to fluctuate, but overall slowly worsening over several years  Reason for s Cr fluctuations is the  use of diuretics and h/o of CHF. Pre-renal azotemia  OK to continue diuretics (despite changes in Cr) as pt will need diuretics for fluid balance  The dose of the diuretics should be proportional to the amount of fluid gain     H/o of DM and HTN  Was on HD x 2.5 years before transplant     Complications of CKD:  K normal  Na normal  Acid base stable, metabolic alkalosis due to diuretics  Ca normal  PO4 stable  Secondary hyperparathyroidism, on calcitriol   Anemia, following with hem/onc      2. Immunosuppression  H/o of living related kidney transplant in 2015 form daughter  Prograf level is slightly below the therapeutic range  Will increase prograf from 4 mg po bid to 5 mg am and 4 mg pm.  Meds and doses reviewed with pt     Leukopenia; had previously small drop in WBC  NO lowered cellcept dose from 750 to 500 mg po bid  WBC currently slightly elevated. Related to CMV?     3. Diarrhea in Dec 2022. Has resolved  Again has positive CMV titer by PCR, after 3 negative tests since 5/9/23. No diarrhea reported  S/p colonoscopy on 6/7/23, found no sign of virally induced inflammation  No sign of CMV colitis on colonoscopy  Valcyte was stopped in July 2023  Will re-start valcyte 450 mg po qod (renal dose adjustment)   Will re-order CMV titer for next clinic visit     4. Cardiac: diastolic and systolic CHF see #5 below.  Limit salt in diet <2-3 g/day  Limit fluids < 1.5 L/day     Previously cardiac amyloid was suspected, was ruled out by above cardiac test  SPEP and UPEP unremarkable, no monoclonality     5. HTN: BP normal to low  Meds reviewed  Recent increase in lasix from 40 mg bid to 80 mg am and 40 mg pm  Asymptomatic, and has had adequate wt loss  Will lower lasix back to 40 mg po bid  Advised pt to avoid salty foods and stay on low to moderate fluid intake      Plans and recommendations:  As discussed above  Complex visit  Total time spent 40 minutes including time needed to review the records, the   patient  evaluation, documentation, face-to-face discussion with the patient,   more than 50% of the time was spent on coordination of care and counseling.    Level V visit.  RTC 3 months     Hany Gonsalez MD

## 2023-10-13 DIAGNOSIS — Z94.0 KIDNEY REPLACED BY TRANSPLANT: ICD-10-CM

## 2023-10-13 RX ORDER — TACROLIMUS 1 MG/1
5 CAPSULE ORAL EVERY 12 HOURS
Qty: 270 CAPSULE | Refills: 11 | Status: SHIPPED | OUTPATIENT
Start: 2023-10-13 | End: 2023-10-13 | Stop reason: SDUPTHER

## 2023-10-13 RX ORDER — TACROLIMUS 1 MG/1
5 CAPSULE ORAL EVERY 12 HOURS
Qty: 270 CAPSULE | Refills: 11 | Status: ACTIVE | OUTPATIENT
Start: 2023-10-13 | End: 2024-10-12

## 2023-10-15 LAB
OHS CV EVENT MONITOR DAY: 0
OHS CV HOLTER LENGTH DECIMAL HOURS: 48
OHS CV HOLTER LENGTH HOURS: 48
OHS CV HOLTER LENGTH MINUTES: 0
OHS CV HOLTER SINUS AVERAGE HR: 93
OHS CV HOLTER SINUS MAX HR: 145
OHS CV HOLTER SINUS MIN HR: 55

## 2023-10-17 ENCOUNTER — TELEPHONE (OUTPATIENT)
Dept: CARDIOLOGY | Facility: CLINIC | Age: 68
End: 2023-10-17
Payer: COMMERCIAL

## 2023-10-17 DIAGNOSIS — I50.42 CHRONIC COMBINED SYSTOLIC AND DIASTOLIC CONGESTIVE HEART FAILURE: Primary | ICD-10-CM

## 2023-10-17 NOTE — TELEPHONE ENCOUNTER
Spoke to the patient an was able to get him scheduled for surgery on 12/1/2023 an also scheduled his 2 post-op as well the patient verbalized all understandings         Vale Franco PA-C Physician Assistant Signed  2:51 PM       Please phone patient. Holter reviewed. No afib noted. Does have frequent PAC's.  Occasional PVC's.     Would recommend increasing Toprol XL to 25 mg BID.     Continue other CV meds/mgmt. No cardiac contraindications to proceeding with carpal tunnel surgery.      May hold Eliquis 2 days prior and resume post OP ASAP.     Message sent to Dr. Almonte and his staff.     Thanks

## 2023-10-17 NOTE — TELEPHONE ENCOUNTER
Please phone patient. Holter reviewed. No afib noted. Does have frequent PAC's.  Occasional PVC's.    Would recommend increasing Toprol XL to 25 mg BID.    Continue other CV meds/mgmt. No cardiac contraindications to proceeding with carpal tunnel surgery.     May hold Eliquis 2 days prior and resume post OP ASAP.    Message sent to Dr. Almonte and his staff.    Thanks

## 2023-10-17 NOTE — TELEPHONE ENCOUNTER
Contacted pt and informed him Holter reviewed. No afib noted. Does have frequent PAC's.  Occasional PVC's. Would recommend increasing Toprol XL to 25 mg BID, and continue other CV meds/mgmt. No cardiac contraindications to proceeding with carpal tunnel surgery - scheduled for 12/01/23. May hold Eliquis 2 days prior and resume post OP ASAP. Pt vu w/o q/c      --- CARLTON Pierson 10/17/23 02:51 PM ---  Please phone patient. Holter reviewed. No afib noted. Does have frequent PAC's.  Occasional PVC's.    Would recommend increasing Toprol XL to 25 mg BID.    Continue other CV meds/mgmt. No cardiac contraindications to proceeding with carpal tunnel surgery.     May hold Eliquis 2 days prior and resume post OP ASAP.    Message sent to Dr. Almonte and his staff.    Thanks

## 2023-10-17 NOTE — TELEPHONE ENCOUNTER
----- Message from Tracy Hernandez sent at 10/17/2023  4:04 PM CDT -----  Pt is requesting a call back concerning a question he forgot to ask. [phone]

## 2023-10-18 ENCOUNTER — TELEPHONE (OUTPATIENT)
Dept: CARDIOLOGY | Facility: CLINIC | Age: 68
End: 2023-10-18
Payer: COMMERCIAL

## 2023-10-18 RX ORDER — METOPROLOL SUCCINATE 25 MG/1
25 TABLET, EXTENDED RELEASE ORAL 2 TIMES DAILY
Qty: 180 TABLET | Refills: 3 | Status: SHIPPED | OUTPATIENT
Start: 2023-10-18 | End: 2024-10-02

## 2023-10-18 NOTE — TELEPHONE ENCOUNTER
Contacted pt regarding a question. Pt states that he spoke w/ someone yesterday and they helped him. Pt vu w/o q/c    ----- Message from Vale Franco PA-C sent at 10/18/2023  8:39 AM CDT -----  Can you call him back? He called yesterday afternoon with a question?    Thanks

## 2023-10-19 DIAGNOSIS — G56.03 BILATERAL CARPAL TUNNEL SYNDROME: Primary | ICD-10-CM

## 2023-10-23 ENCOUNTER — TELEPHONE (OUTPATIENT)
Dept: HEMATOLOGY/ONCOLOGY | Facility: CLINIC | Age: 68
End: 2023-10-23
Payer: COMMERCIAL

## 2023-10-23 ENCOUNTER — PATIENT MESSAGE (OUTPATIENT)
Dept: HEMATOLOGY/ONCOLOGY | Facility: CLINIC | Age: 68
End: 2023-10-23
Payer: COMMERCIAL

## 2023-10-27 ENCOUNTER — TELEPHONE (OUTPATIENT)
Dept: HEMATOLOGY/ONCOLOGY | Facility: CLINIC | Age: 68
End: 2023-10-27
Payer: COMMERCIAL

## 2023-10-27 DIAGNOSIS — N18.9 ANEMIA ASSOCIATED WITH CHRONIC RENAL FAILURE: Primary | ICD-10-CM

## 2023-10-27 DIAGNOSIS — D72.829 LEUKOCYTOSIS: ICD-10-CM

## 2023-10-27 DIAGNOSIS — D63.1 ANEMIA ASSOCIATED WITH CHRONIC RENAL FAILURE: Primary | ICD-10-CM

## 2023-11-06 ENCOUNTER — LAB VISIT (OUTPATIENT)
Dept: LAB | Facility: HOSPITAL | Age: 68
End: 2023-11-06
Attending: INTERNAL MEDICINE
Payer: COMMERCIAL

## 2023-11-06 DIAGNOSIS — Z94.0 KIDNEY REPLACED BY TRANSPLANT: ICD-10-CM

## 2023-11-06 LAB
ALBUMIN SERPL BCP-MCNC: 3.4 G/DL (ref 3.5–5.2)
ALBUMIN SERPL BCP-MCNC: 3.4 G/DL (ref 3.5–5.2)
ALP SERPL-CCNC: 56 U/L (ref 55–135)
ALT SERPL W/O P-5'-P-CCNC: 12 U/L (ref 10–44)
ANION GAP SERPL CALC-SCNC: 9 MMOL/L (ref 8–16)
AST SERPL-CCNC: 17 U/L (ref 10–40)
BASOPHILS # BLD AUTO: 0.03 K/UL (ref 0–0.2)
BASOPHILS NFR BLD: 1.1 % (ref 0–1.9)
BILIRUB DIRECT SERPL-MCNC: 0.2 MG/DL (ref 0.1–0.3)
BILIRUB SERPL-MCNC: 0.4 MG/DL (ref 0.1–1)
BUN SERPL-MCNC: 45 MG/DL (ref 8–23)
CALCIUM SERPL-MCNC: 9.4 MG/DL (ref 8.7–10.5)
CHLORIDE SERPL-SCNC: 105 MMOL/L (ref 95–110)
CO2 SERPL-SCNC: 28 MMOL/L (ref 23–29)
CREAT SERPL-MCNC: 2.3 MG/DL (ref 0.5–1.4)
DIFFERENTIAL METHOD: ABNORMAL
EOSINOPHIL # BLD AUTO: 0 K/UL (ref 0–0.5)
EOSINOPHIL NFR BLD: 0.7 % (ref 0–8)
ERYTHROCYTE [DISTWIDTH] IN BLOOD BY AUTOMATED COUNT: 15.5 % (ref 11.5–14.5)
EST. GFR  (NO RACE VARIABLE): 30 ML/MIN/1.73 M^2
GLUCOSE SERPL-MCNC: 190 MG/DL (ref 70–110)
HCT VFR BLD AUTO: 36.9 % (ref 40–54)
HGB BLD-MCNC: 10.4 G/DL (ref 14–18)
IMM GRANULOCYTES # BLD AUTO: 0.04 K/UL (ref 0–0.04)
IMM GRANULOCYTES NFR BLD AUTO: 1.4 % (ref 0–0.5)
LYMPHOCYTES # BLD AUTO: 0.9 K/UL (ref 1–4.8)
LYMPHOCYTES NFR BLD: 33.6 % (ref 18–48)
MCH RBC QN AUTO: 24.9 PG (ref 27–31)
MCHC RBC AUTO-ENTMCNC: 28.2 G/DL (ref 32–36)
MCV RBC AUTO: 89 FL (ref 82–98)
MONOCYTES # BLD AUTO: 0.4 K/UL (ref 0.3–1)
MONOCYTES NFR BLD: 12.5 % (ref 4–15)
NEUTROPHILS # BLD AUTO: 1.4 K/UL (ref 1.8–7.7)
NEUTROPHILS NFR BLD: 50.7 % (ref 38–73)
NRBC BLD-RTO: 0 /100 WBC
PHOSPHATE SERPL-MCNC: 3.1 MG/DL (ref 2.7–4.5)
PLATELET # BLD AUTO: 169 K/UL (ref 150–450)
PMV BLD AUTO: 12.3 FL (ref 9.2–12.9)
POTASSIUM SERPL-SCNC: 4.2 MMOL/L (ref 3.5–5.1)
PROT SERPL-MCNC: 6.4 G/DL (ref 6–8.4)
PTH-INTACT SERPL-MCNC: 185.9 PG/ML (ref 9–77)
RBC # BLD AUTO: 4.17 M/UL (ref 4.6–6.2)
SODIUM SERPL-SCNC: 142 MMOL/L (ref 136–145)
WBC # BLD AUTO: 2.8 K/UL (ref 3.9–12.7)

## 2023-11-06 PROCEDURE — 80197 ASSAY OF TACROLIMUS: CPT | Performed by: INTERNAL MEDICINE

## 2023-11-06 PROCEDURE — 80069 RENAL FUNCTION PANEL: CPT | Performed by: INTERNAL MEDICINE

## 2023-11-06 PROCEDURE — 36415 COLL VENOUS BLD VENIPUNCTURE: CPT | Performed by: INTERNAL MEDICINE

## 2023-11-06 PROCEDURE — 84075 ASSAY ALKALINE PHOSPHATASE: CPT | Performed by: INTERNAL MEDICINE

## 2023-11-06 PROCEDURE — 85025 COMPLETE CBC W/AUTO DIFF WBC: CPT | Performed by: INTERNAL MEDICINE

## 2023-11-06 PROCEDURE — 83970 ASSAY OF PARATHORMONE: CPT | Performed by: INTERNAL MEDICINE

## 2023-11-06 RX ORDER — PEN NEEDLE, DIABETIC 31 GX5/16"
NEEDLE, DISPOSABLE MISCELLANEOUS
Qty: 100 EACH | Refills: 3 | Status: CANCELLED | OUTPATIENT
Start: 2023-11-06

## 2023-11-06 NOTE — TELEPHONE ENCOUNTER
Care Due:                  Date            Visit Type   Department     Provider  --------------------------------------------------------------------------------                                EP -                              PRIMARY      [facility]  Last Visit: 03-      CARE ([facility])   MEDICINE       Alix Kowalski  Next Visit: None Scheduled  None         None Found                                                            Last  Test          Frequency    Reason                     Performed    Due Date  --------------------------------------------------------------------------------    HBA1C.......  6 months...  glimepiride, insulin.....  03- 09-    Mg Level....  12 months..  magnesium................  03- 02-    [employer] Embedded Care Due Messages. Reference number: [id number].   11/06/2023 8:17:21 AM CST

## 2023-11-07 ENCOUNTER — TELEPHONE (OUTPATIENT)
Dept: TRANSPLANT | Facility: CLINIC | Age: 68
End: 2023-11-07
Payer: COMMERCIAL

## 2023-11-07 LAB
CMV DNA SPEC QL NAA+PROBE: ABNORMAL
CYTOMEGALOVIRUS LOG (IU/ML): <1.48 LOGIU/ML
CYTOMEGALOVIRUS PCR, QUANT: <30 IU/ML
TACROLIMUS BLD-MCNC: 7.1 NG/ML (ref 5–15)

## 2023-11-07 RX ORDER — PEN NEEDLE, DIABETIC 31 GX5/16"
NEEDLE, DISPOSABLE MISCELLANEOUS
Qty: 100 EACH | Refills: 5 | Status: SHIPPED | OUTPATIENT
Start: 2023-11-07

## 2023-11-07 NOTE — TELEPHONE ENCOUNTER
Left voice message to call coordinator back.  ----- Message from Marci Pan MD sent at 11/7/2023  1:08 PM CST -----  Check CMV PCR in a month.

## 2023-11-07 NOTE — TELEPHONE ENCOUNTER
Patient repeated back and voice a understanding of orders and will repeat CMV on 12/5/2023.  ----- Message from Marci Pan MD sent at 11/7/2023  1:08 PM CST -----  Check CMV PCR in a month.

## 2023-11-07 NOTE — TELEPHONE ENCOUNTER
Patient repeated back and voice a understanding of orders.  To repeat CBC here on 11/13.  States Dr Gonsalez restarted Valcyte 450mg Q Mon/Wed/Fri/Sun last month for CMV.  ----- Message from Marci Pan MD sent at 11/7/2023  7:37 AM CST -----  Low WBC: . Is he still on valcyte?   Check CBC In a week

## 2023-11-07 NOTE — TELEPHONE ENCOUNTER
No care due was identified.  [facility] Embedded Care Due Messages. Reference number: [id number].   11/07/2023 2:47:46 PM CST

## 2023-11-13 ENCOUNTER — OFFICE VISIT (OUTPATIENT)
Dept: TRANSPLANT | Facility: CLINIC | Age: 68
End: 2023-11-13
Payer: COMMERCIAL

## 2023-11-13 ENCOUNTER — LAB VISIT (OUTPATIENT)
Dept: LAB | Facility: HOSPITAL | Age: 68
End: 2023-11-13
Attending: INTERNAL MEDICINE
Payer: COMMERCIAL

## 2023-11-13 ENCOUNTER — TELEPHONE (OUTPATIENT)
Dept: TRANSPLANT | Facility: CLINIC | Age: 68
End: 2023-11-13
Payer: COMMERCIAL

## 2023-11-13 VITALS
DIASTOLIC BLOOD PRESSURE: 69 MMHG | OXYGEN SATURATION: 100 % | SYSTOLIC BLOOD PRESSURE: 133 MMHG | BODY MASS INDEX: 42.04 KG/M2 | WEIGHT: 267.88 LBS | TEMPERATURE: 97 F | HEART RATE: 74 BPM | HEIGHT: 67 IN | RESPIRATION RATE: 18 BRPM

## 2023-11-13 DIAGNOSIS — Z29.89 PROPHYLACTIC IMMUNOTHERAPY: Chronic | ICD-10-CM

## 2023-11-13 DIAGNOSIS — E11.22 HYPERTENSION ASSOCIATED WITH STAGE 3 CHRONIC KIDNEY DISEASE DUE TO TYPE 2 DIABETES MELLITUS: ICD-10-CM

## 2023-11-13 DIAGNOSIS — N18.30 HYPERTENSION ASSOCIATED WITH STAGE 3 CHRONIC KIDNEY DISEASE DUE TO TYPE 2 DIABETES MELLITUS: ICD-10-CM

## 2023-11-13 DIAGNOSIS — E11.8 DM (DIABETES MELLITUS), TYPE 2 WITH COMPLICATIONS: ICD-10-CM

## 2023-11-13 DIAGNOSIS — I12.9 HYPERTENSION ASSOCIATED WITH STAGE 3 CHRONIC KIDNEY DISEASE DUE TO TYPE 2 DIABETES MELLITUS: ICD-10-CM

## 2023-11-13 DIAGNOSIS — Z94.0 KIDNEY TRANSPLANTED: Primary | ICD-10-CM

## 2023-11-13 DIAGNOSIS — Z94.0 KIDNEY REPLACED BY TRANSPLANT: ICD-10-CM

## 2023-11-13 LAB
ANISOCYTOSIS BLD QL SMEAR: SLIGHT
BASOPHILS NFR BLD: 1 % (ref 0–1.9)
DIFFERENTIAL METHOD: ABNORMAL
EOSINOPHIL NFR BLD: 1 % (ref 0–8)
ERYTHROCYTE [DISTWIDTH] IN BLOOD BY AUTOMATED COUNT: 15.1 % (ref 11.5–14.5)
HCT VFR BLD AUTO: 37.7 % (ref 40–54)
HGB BLD-MCNC: 10.9 G/DL (ref 14–18)
IMM GRANULOCYTES # BLD AUTO: ABNORMAL K/UL (ref 0–0.04)
IMM GRANULOCYTES NFR BLD AUTO: ABNORMAL % (ref 0–0.5)
LYMPHOCYTES NFR BLD: 37 % (ref 18–48)
MCH RBC QN AUTO: 25.3 PG (ref 27–31)
MCHC RBC AUTO-ENTMCNC: 28.9 G/DL (ref 32–36)
MCV RBC AUTO: 88 FL (ref 82–98)
MONOCYTES NFR BLD: 13 % (ref 4–15)
MYELOCYTES NFR BLD MANUAL: 1 %
NEUTROPHILS NFR BLD: 46 % (ref 38–73)
NEUTS BAND NFR BLD MANUAL: 1 %
NRBC BLD-RTO: 0 /100 WBC
PLATELET # BLD AUTO: 177 K/UL (ref 150–450)
PLATELET BLD QL SMEAR: ABNORMAL
PMV BLD AUTO: 11.6 FL (ref 9.2–12.9)
POIKILOCYTOSIS BLD QL SMEAR: SLIGHT
RBC # BLD AUTO: 4.31 M/UL (ref 4.6–6.2)
WBC # BLD AUTO: 3.42 K/UL (ref 3.9–12.7)

## 2023-11-13 PROCEDURE — 3061F NEG MICROALBUMINURIA REV: CPT | Mod: CPTII,S$GLB,, | Performed by: NURSE PRACTITIONER

## 2023-11-13 PROCEDURE — 1100F PR PT FALLS ASSESS DOC 2+ FALLS/FALL W/INJURY/YR: ICD-10-PCS | Mod: CPTII,S$GLB,, | Performed by: NURSE PRACTITIONER

## 2023-11-13 PROCEDURE — 3066F PR DOCUMENTATION OF TREATMENT FOR NEPHROPATHY: ICD-10-PCS | Mod: CPTII,S$GLB,, | Performed by: NURSE PRACTITIONER

## 2023-11-13 PROCEDURE — 1126F PR PAIN SEVERITY QUANTIFIED, NO PAIN PRESENT: ICD-10-PCS | Mod: CPTII,S$GLB,, | Performed by: NURSE PRACTITIONER

## 2023-11-13 PROCEDURE — 1159F MED LIST DOCD IN RCRD: CPT | Mod: CPTII,S$GLB,, | Performed by: NURSE PRACTITIONER

## 2023-11-13 PROCEDURE — 99999 PR PBB SHADOW E&M-EST. PATIENT-LVL V: ICD-10-PCS | Mod: PBBFAC,,, | Performed by: NURSE PRACTITIONER

## 2023-11-13 PROCEDURE — 1159F PR MEDICATION LIST DOCUMENTED IN MEDICAL RECORD: ICD-10-PCS | Mod: CPTII,S$GLB,, | Performed by: NURSE PRACTITIONER

## 2023-11-13 PROCEDURE — 3078F DIAST BP <80 MM HG: CPT | Mod: CPTII,S$GLB,, | Performed by: NURSE PRACTITIONER

## 2023-11-13 PROCEDURE — 99215 PR OFFICE/OUTPT VISIT, EST, LEVL V, 40-54 MIN: ICD-10-PCS | Mod: S$GLB,,, | Performed by: NURSE PRACTITIONER

## 2023-11-13 PROCEDURE — 4010F ACE/ARB THERAPY RXD/TAKEN: CPT | Mod: CPTII,S$GLB,, | Performed by: NURSE PRACTITIONER

## 2023-11-13 PROCEDURE — 99999 PR PBB SHADOW E&M-EST. PATIENT-LVL V: CPT | Mod: PBBFAC,,, | Performed by: NURSE PRACTITIONER

## 2023-11-13 PROCEDURE — 3075F SYST BP GE 130 - 139MM HG: CPT | Mod: CPTII,S$GLB,, | Performed by: NURSE PRACTITIONER

## 2023-11-13 PROCEDURE — 3078F PR MOST RECENT DIASTOLIC BLOOD PRESSURE < 80 MM HG: ICD-10-PCS | Mod: CPTII,S$GLB,, | Performed by: NURSE PRACTITIONER

## 2023-11-13 PROCEDURE — 4010F PR ACE/ARB THEARPY RXD/TAKEN: ICD-10-PCS | Mod: CPTII,S$GLB,, | Performed by: NURSE PRACTITIONER

## 2023-11-13 PROCEDURE — 3061F PR NEG MICROALBUMINURIA RESULT DOCUMENTED/REVIEW: ICD-10-PCS | Mod: CPTII,S$GLB,, | Performed by: NURSE PRACTITIONER

## 2023-11-13 PROCEDURE — 3075F PR MOST RECENT SYSTOLIC BLOOD PRESS GE 130-139MM HG: ICD-10-PCS | Mod: CPTII,S$GLB,, | Performed by: NURSE PRACTITIONER

## 2023-11-13 PROCEDURE — 3044F HG A1C LEVEL LT 7.0%: CPT | Mod: CPTII,S$GLB,, | Performed by: NURSE PRACTITIONER

## 2023-11-13 PROCEDURE — 3066F NEPHROPATHY DOC TX: CPT | Mod: CPTII,S$GLB,, | Performed by: NURSE PRACTITIONER

## 2023-11-13 PROCEDURE — 1100F PTFALLS ASSESS-DOCD GE2>/YR: CPT | Mod: CPTII,S$GLB,, | Performed by: NURSE PRACTITIONER

## 2023-11-13 PROCEDURE — 1126F AMNT PAIN NOTED NONE PRSNT: CPT | Mod: CPTII,S$GLB,, | Performed by: NURSE PRACTITIONER

## 2023-11-13 PROCEDURE — 3288F PR FALLS RISK ASSESSMENT DOCUMENTED: ICD-10-PCS | Mod: CPTII,S$GLB,, | Performed by: NURSE PRACTITIONER

## 2023-11-13 PROCEDURE — 3008F BODY MASS INDEX DOCD: CPT | Mod: CPTII,S$GLB,, | Performed by: NURSE PRACTITIONER

## 2023-11-13 PROCEDURE — 36415 COLL VENOUS BLD VENIPUNCTURE: CPT | Performed by: INTERNAL MEDICINE

## 2023-11-13 PROCEDURE — 99215 OFFICE O/P EST HI 40 MIN: CPT | Mod: S$GLB,,, | Performed by: NURSE PRACTITIONER

## 2023-11-13 PROCEDURE — 85027 COMPLETE CBC AUTOMATED: CPT | Performed by: INTERNAL MEDICINE

## 2023-11-13 PROCEDURE — 3288F FALL RISK ASSESSMENT DOCD: CPT | Mod: CPTII,S$GLB,, | Performed by: NURSE PRACTITIONER

## 2023-11-13 PROCEDURE — 85007 BL SMEAR W/DIFF WBC COUNT: CPT | Performed by: INTERNAL MEDICINE

## 2023-11-13 PROCEDURE — 3044F PR MOST RECENT HEMOGLOBIN A1C LEVEL <7.0%: ICD-10-PCS | Mod: CPTII,S$GLB,, | Performed by: NURSE PRACTITIONER

## 2023-11-13 PROCEDURE — 3008F PR BODY MASS INDEX (BMI) DOCUMENTED: ICD-10-PCS | Mod: CPTII,S$GLB,, | Performed by: NURSE PRACTITIONER

## 2023-11-13 NOTE — TELEPHONE ENCOUNTER
Left voice message to call coordinator back.  ----- Message from Marci Pan MD sent at 11/13/2023 11:01 AM CST -----  CBC next Monday

## 2023-11-13 NOTE — LETTER
November 13, 2023        Hany Gonsalez  [address] [address] [place] [address]  Phone: [phone]  Fax: [phone]             [facility]  [address]  [facility] [address]  Phone: [phone]   Patient: Mitch Whittaker   MR Number: [id number]   YOB: 1955   Date of Visit: 11/13/2023       Dear Dr. Hany Gonsalez    Thank you for referring Mitch Whittaker to me for evaluation. Attached you will find relevant portions of my assessment and plan of care.    If you have questions, please do not hesitate to call me. I look forward to following Mitch Whittaker along with you.    Sincerely,    Raeann Glaser NP    Enclosure    If you would like to receive this communication electronically, please contact [email or web address] or [phone] to request [employer] access.    [employer] is a tool which provides read-only access to select patient information with whom you have a relationship. Its easy to use and provides real time access to review your patients record including encounter summaries, notes, results, and demographic information.    If you feel you have received this communication in error or would no longer like to receive these types of communications, please e-mail [email or web address]

## 2023-11-13 NOTE — Clinical Note
History of recurrent CMV. CMV 76 on 10/5/23; CMV < 30 11/6/23.  Still on valcyte. Do you want the patient to remain on valcyte?  Also he has a planned carpal tunnel surgery scheduled on 12/1/23; is that okay?

## 2023-11-13 NOTE — PROGRESS NOTES
Kidney Post-Transplant Assessment    Referring Physician: Bucky Danielle  Current Nephrologist: Bucky Danielle    ORGAN: LEFT KIDNEY  Donor Type: living  PHS Increased Risk: no  Cold Ischemia: 44 mins  Induction Medications: thymoglobulin    Subjective:     CC:  Reassessment of renal allograft function and management of chronic immunosuppression.    HPI:  Mr. Whittaker is a 67 y.o. year old Black or  male who received a living kidney transplant on 6/12/15.  He has baseline creatinine is between 2.2-3.5.  He takes mycophenolate mofetil, prednisone and tacrolimus for maintenance immunosuppression.  His last cr 2.5.     Pertinent History  1. Chronic combined systolic and diastolic heart failure//HFrEF; stage C, FC 3; EF 50-55% (8/2023)  2. CAD   3. LBBB  4. S/p living donor renal transplant (6/12/2015) - CKD 3-4  5.  H/o DVT  6. DM 2  7. Chronic venous insufficiency   8.  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 He is still on valcyte. Plans for removal but repeat testing at the time was positive. CMV 76 on 10/5/23; CMV < 30 11/6/23.  Pt has no complaints of diarrhea, dysphagia. he denies any chest pain, shortness of breath, ENT symptoms, nausea/vomiting, dysuria, frequency, urgency, or pain over the allograft. Hi kidney function has declined slowly over the time. His last Cr 2.3 ( improved in comparison to previous values). With improvement in WBC from 2.8---3.4. Plans on carpal tunnel syndrome surgery 12/1/23    Pt follows with his PCP for his DM. He has also seen Dermatologist. Following with his nephrologist for CKD and his Cardiologist for Heart failure      Current Outpatient Medications:     amitriptyline (ELAVIL) 25 MG tablet, Take 1 tablet (25 mg total) by mouth nightly as needed for Insomnia., Disp: 30 tablet, Rfl: 2    apixaban (ELIQUIS) 5 mg Tab, Take 1 tablet (5 mg total) by mouth 2 (two) times daily., Disp: 180 tablet, Rfl: 1    aspirin  "(ECOTRIN) 81 MG EC tablet, Take 1 tablet by mouth Daily. , Disp: , Rfl:     BD ULTRA-FINE MINI PEN NEEDLE 31 gauge x 3/16" Ndle, Use to inject insulin as needed up to 4 times daily, Disp: 100 each, Rfl: 5    blood sugar diagnostic (CONTOUR NEXT TEST STRIPS) Strp, by Misc.(Non-Drug; Combo Route) route 4 (four) times daily before meals and nightly., Disp: 100 each, Rfl: 1    blood-glucose meter (FREESTYLE LITE METER) kit, 1 each 4 (four) times daily., Disp: 1 each, Rfl: 0    calcitRIOL (ROCALTROL) 0.25 MCG Cap, Take 1 capsule (0.25 mcg total) by mouth once daily., Disp: 30 capsule, Rfl: 11    COVID [id number],,andu,,PF, (SPIKEVAX 8209-9757,12Y UP,,PF,) 50 mcg/0.5 mL injection, Inject into the muscle., Disp: 0.5 mL, Rfl: 0    famotidine (PEPCID) 20 MG tablet, TAKE ONE TABLET BY MOUTH EVERY EVENING, Disp: 30 tablet, Rfl: 11    fish oil-omega-3 fatty acids 300-1,000 mg capsule, Take 1 g by mouth once daily., Disp: , Rfl:     furosemide (LASIX) 80 MG tablet, Take one-half tablet (40 mg) by mouth 2 (two) times daily., Disp: 30 tablet, Rfl: 11    gabapentin (NEURONTIN) 100 MG capsule, Take 1 capsule (100 mg total) by mouth 3 (three) times daily., Disp: 90 capsule, Rfl: 11    glimepiride (AMARYL) 2 MG tablet, Take 1 tablet (2 mg total) by mouth before breakfast., Disp: 90 tablet, Rfl: 3    insulin (LANTUS SOLOSTAR U-100 INSULIN) glargine 100 units/mL SubQ pen, Inject 35 Units into the skin 2 (two) times daily., Disp: 60 mL, Rfl: 0    insulin aspart U-100 (NOVOLOG FLEXPEN U-100 INSULIN) 100 unit/mL (3 mL) InPn pen, Inject 25 Units into the skin 3 (three) times daily with meals., Disp: 30 mL, Rfl: 2    ketoconazole (NIZORAL) 200 mg Tab, Take HALF A tablet (100 mg total) by mouth once daily., Disp: 15 tablet, Rfl: 9    lancets (MICROLET LANCET) Misc, 100 lancets by Misc.(Non-Drug; Combo Route) route 4 (four) times daily before meals and nightly., Disp: 100 each, Rfl: 11    magnesium oxide (MAG-OX) 400 mg (241.3 mg magnesium) " tablet, Take 1 tablet (400 mg total) by mouth once daily., Disp: 90 tablet, Rfl: 2    metoprolol succinate (TOPROL-XL) 25 MG 24 hr tablet, Take 1 tablet (25 mg total) by mouth 2 (two) times daily., Disp: 180 tablet, Rfl: 3    montelukast (SINGULAIR) 10 mg tablet, Take 1 tablet (10 mg total) by mouth every evening., Disp: 90 tablet, Rfl: 0    multivitamin (THERAGRAN) tablet, Take 1 tablet by mouth once daily., Disp: , Rfl:     mycophenolate (CELLCEPT) 250 mg Cap, Take 2 capsules (500 mg total) by mouth 2 (two) times daily., Disp: 120 capsule, Rfl: 11    ondansetron (ZOFRAN) 4 MG tablet, Take 1 tablet (4 mg total) by mouth every 6 (six) hours., Disp: 30 tablet, Rfl: 0    potassium chloride SA (K-DUR,KLOR-CON) 20 MEQ tablet, Take 2 tablets (40 mEq total) by mouth once daily., Disp: 180 tablet, Rfl: 1    predniSONE (DELTASONE) 5 MG tablet, Take 1 tablet (5 mg total) by mouth once daily., Disp: 30 tablet, Rfl: 11    promethazine-dextromethorphan (PROMETHAZINE-DM) 6.25-15 mg/5 mL Syrp, Take 5 mLs by mouth every 6 (six) hours as needed (cough)., Disp: 180 mL, Rfl: 0    rosuvastatin (CRESTOR) 40 MG Tab, Take 1 tablet (40 mg total) by mouth once daily in the evening., Disp: 90 tablet, Rfl: 1    sacubitriL-valsartan (ENTRESTO)  mg per tablet, Take 1 tablet by mouth 2 (two) times daily., Disp: 180 tablet, Rfl: 1    tacrolimus (PROGRAF) 1 MG Cap, Take 5 mg every morning and 4 mg every evening (9 mg per day total)., Disp: 270 capsule, Rfl: 11    tacrolimus (PROGRAF) 1 MG Cap, Take 5 mg by mouth every moring and take 4 mg by mouth in the evening, Disp: 270 capsule, Rfl: 11    tamsulosin (FLOMAX) 0.4 mg Cap, Take 1 capsule (0.4 mg total) by mouth once daily., Disp: 30 capsule, Rfl: 11    traMADoL (ULTRAM) 50 mg tablet, Take 1 tablet (50 mg total) by mouth every 4 (four) hours as needed for Pain., Disp: 30 tablet, Rfl: 0    triamcinolone acetonide 0.1% (KENALOG) 0.1 % ointment, Apply topically 2 (two) times daily. Use on  bilateral lower legs., Disp: 454 g, Rfl: 1    valGANciclovir (VALCYTE) 450 mg Tab, Take 1 tablet (450 mg total) by mouth every other day., Disp: 45 tablet, Rfl: 1    Current Facility-Administered Medications:     acetaminophen tablet 650 mg, 650 mg, Oral, Once PRN, Sal Lopez MD      Past Medical History:   Diagnosis Date    Acquired renal cyst of left kidney     Anemia associated with chronic renal failure     CAD (coronary artery disease)     nonobstructive lhc 9/14    CHF (congestive heart failure)     Chronic immunosuppression with Prograf and MMF 06/18/2015    Chronic venous insufficiency of lower extremity     CKD (chronic kidney disease) stage 3, GFR 30-59 ml/min     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 RNA NEGATIVE 5/2015, 2021, 2022    History of colon polyps     HPTH (hyperparathyroidism)     Hyperlipidemia     Hypertension associated with stage 3 chronic kidney disease due to type 2 diabetes mellitus     LBBB (left bundle branch block) 12/20/2021    Morbid obesity with BMI of 45.0-49.9, adult     Nephrolithiasis 6/7/2013    PCO (posterior capsular opacification), left 03/04/2019    Proteinuria     resolved s/p transplant    S/P kidney transplant     Sleep apnea     Type 2 diabetes, uncontrolled, with retinopathy     Type II diabetes mellitus with renal manifestations        Review of Systems   Constitutional:  Negative for activity change, appetite change, chills, fatigue, fever and unexpected weight change.   HENT:  Negative for congestion, facial swelling, postnasal drip, rhinorrhea, sinus pressure, sore throat and trouble swallowing.         Seasonal  "allergies   Eyes:  Negative for pain, redness and visual disturbance.   Respiratory:  Negative for cough, chest tightness, shortness of breath and wheezing.    Cardiovascular:  Positive for leg swelling. Negative for chest pain and palpitations.   Gastrointestinal:  Negative for abdominal pain, diarrhea, nausea and vomiting.   Genitourinary:  Negative for dysuria, flank pain and urgency.   Musculoskeletal:  Negative for gait problem, neck pain and neck stiffness.   Skin:  Negative for rash.   Allergic/Immunologic: Positive for immunocompromised state. Negative for environmental allergies and food allergies.   Neurological:  Negative for dizziness, weakness, light-headedness and headaches.   Psychiatric/Behavioral:  Negative for agitation and confusion. The patient is not nervous/anxious.        Objective:   Blood pressure 133/69, pulse 74, temperature 97.3 °F (36.3 °C), temperature source Temporal, resp. rate 18, height 5' 7" (1.702 m), weight 121.5 kg (267 lb 13.7 oz), SpO2 100 %.body mass index is 41.95 kg/m².    Physical Exam  Vitals reviewed.   Constitutional:       Appearance: Normal appearance. He is well-developed.   HENT:      Head: Normocephalic.      Mouth/Throat:      Pharynx: No oropharyngeal exudate.   Eyes:      General: No scleral icterus.     Conjunctiva/sclera: Conjunctivae normal.      Pupils: Pupils are equal, round, and reactive to light.   Cardiovascular:      Rate and Rhythm: Normal rate and regular rhythm.      Heart sounds: Normal heart sounds.   Pulmonary:      Effort: Pulmonary effort is normal.      Breath sounds: Normal breath sounds.   Abdominal:      General: Bowel sounds are normal. There is no distension.      Palpations: Abdomen is soft. There is no hepatomegaly, splenomegaly or mass.      Tenderness: There is no abdominal tenderness. There is no guarding or rebound. Negative signs include Durand's sign and McBurney's sign.       Musculoskeletal:         General: Normal range of " "motion.      Cervical back: Normal range of motion and neck supple.      Right lower leg: Edema present.      Left lower leg: Edema present.      Comments: 1+ edema   Lymphadenopathy:      Cervical: No cervical adenopathy.   Skin:     General: Skin is warm and dry.   Neurological:      Mental Status: He is alert and oriented to person, place, and time.      Motor: No abnormal muscle tone.      Coordination: Coordination normal.   Psychiatric:         Behavior: Behavior normal.         Labs:  Lab Results   Component Value Date    WBC 3.42 (L) 11/13/2023    HGB 10.9 (L) 11/13/2023    HCT 37.7 (L) 11/13/2023     11/06/2023    K 4.2 11/06/2023     11/06/2023    CO2 28 11/06/2023    BUN 45 (H) 11/06/2023    CREATININE 2.3 (H) 11/06/2023    EGFRNONAA 19.6 (A) 07/06/2022    CALCIUM 9.4 11/06/2023    PHOS 3.1 11/06/2023    MG 2.1 03/03/2022    ALBUMIN 3.4 (L) 11/06/2023    ALBUMIN 3.4 (L) 11/06/2023    AST 17 11/06/2023    ALT 12 11/06/2023       No results found for: "EXTANC", "EXTWBC", "EXTSEGS", "EXTPLATELETS", "EXTHEMOGLOBI", "EXTHEMATOCRI", "EXTCREATININ", "EXTSODIUM", "EXTPOTASSIUM", "EXTBUN", "EXTCO2", "EXTCALCIUM", "EXTPHOSPHORU", "EXTGLUCOSE", "EXTALBUMIN", "EXTAST", "EXTALT", "EXTBILITOTAL", "EXTLIPASE", "EXTAMYLASE"    No results found for: "EXTCYCLOSLVL", "EXTSIROLIMUS", "EXTTACROLVL", "EXTPROTCRE", "EXTPTHINTACT", "EXTPROTEINUA", "EXTWBCUA", "EXTRBCUA"    Labs were reviewed with the patient.    Assessment:     1. Kidney transplanted    2. Chronic immunosuppression with Prograf, MMF and prednisone    3. DM (diabetes mellitus), type 2 with complications    4. Hypertension associated with stage 3 chronic kidney disease due to type 2 diabetes mellitus          Plan:   CMV PCR in a month  Low salt diet   Water intake 1 liter/day   Continue to follow with PCP (DM and health maintenance and health screening) and General Nephrologist (CKD care)  Continue Dermatologist for yearly skin check due to IS therapy " and rah  Continue to follow with Cardiology for heart failure  Encourage lifestyle modifications: diet, exercise, weight loss       Allograft function assessment:  - CKD3-4 - never had kidney biopsy.   - last cr 2.5  - no significant proteinuria    Immunosuppression post Transplant  -Target level for Mitch is 4-5  - on MMF, prograf and predniosne. Monitor tac for toxicity  -Monitor for side effects and toxicities, given narrow therapeutic window and significant risk of AE       Leukopenia:   - mild leukopenia due to valcyte.   - last CMV PCR < 30  - monthly CMV PCR  - MMF to 500 mg BID       Hypertension-- on multiple meds for his heart failure. Has couple low BP readings   BP Readings from Last 3 Encounters:   11/13/23 133/69   10/12/23 (!) 102/54   09/18/23 116/84   on hydralazine, MTP, Entresto along with ASA and Apixaban --deferred management to Cardiology         Education:   Material provided to the patient.  Patient reminded to call with any health changes since these can be early signs of significant complications.  Also, I advised the patient to be sure any new medications or changes of old medications are discussed with either a pharmacist or physician knowledgeable with transplant to avoid rejection/drug toxicity related to significant drug interactions.      Raeann Glaser NP

## 2023-11-14 ENCOUNTER — TELEPHONE (OUTPATIENT)
Dept: TRANSPLANT | Facility: CLINIC | Age: 68
End: 2023-11-14
Payer: COMMERCIAL

## 2023-11-14 NOTE — TELEPHONE ENCOUNTER
Patient repeated back and voice a understanding of orders.  Next CBC on Monday 11/20.  ----- Message from Marci Pan MD sent at 11/13/2023 11:01 AM CST -----  CBC next Monday

## 2023-11-17 ENCOUNTER — LAB VISIT (OUTPATIENT)
Dept: LAB | Facility: HOSPITAL | Age: 68
End: 2023-11-17
Attending: INTERNAL MEDICINE
Payer: COMMERCIAL

## 2023-11-17 ENCOUNTER — OFFICE VISIT (OUTPATIENT)
Dept: OPHTHALMOLOGY | Facility: CLINIC | Age: 68
End: 2023-11-17
Payer: COMMERCIAL

## 2023-11-17 DIAGNOSIS — D63.1 ANEMIA ASSOCIATED WITH CHRONIC RENAL FAILURE: ICD-10-CM

## 2023-11-17 DIAGNOSIS — Z94.0 KIDNEY TRANSPLANTED: ICD-10-CM

## 2023-11-17 DIAGNOSIS — N18.9 ANEMIA ASSOCIATED WITH CHRONIC RENAL FAILURE: ICD-10-CM

## 2023-11-17 DIAGNOSIS — Z96.1 PSEUDOPHAKIA: ICD-10-CM

## 2023-11-17 DIAGNOSIS — H35.373 EPIRETINAL MEMBRANE (ERM) OF BOTH EYES: ICD-10-CM

## 2023-11-17 DIAGNOSIS — D72.829 LEUKOCYTOSIS: ICD-10-CM

## 2023-11-17 DIAGNOSIS — Z79.4 TYPE 2 DIABETES MELLITUS WITH STABLE PROLIFERATIVE RETINOPATHY OF BOTH EYES, WITH LONG-TERM CURRENT USE OF INSULIN: Primary | ICD-10-CM

## 2023-11-17 DIAGNOSIS — E11.3553 TYPE 2 DIABETES MELLITUS WITH STABLE PROLIFERATIVE RETINOPATHY OF BOTH EYES, WITH LONG-TERM CURRENT USE OF INSULIN: Primary | ICD-10-CM

## 2023-11-17 LAB
ALBUMIN SERPL BCP-MCNC: 3.4 G/DL (ref 3.5–5.2)
ALP SERPL-CCNC: 57 U/L (ref 55–135)
ALT SERPL W/O P-5'-P-CCNC: 18 U/L (ref 10–44)
ANION GAP SERPL CALC-SCNC: 13 MMOL/L (ref 8–16)
ANISOCYTOSIS BLD QL SMEAR: SLIGHT
AST SERPL-CCNC: 19 U/L (ref 10–40)
BASOPHILS # BLD AUTO: ABNORMAL K/UL (ref 0–0.2)
BASOPHILS NFR BLD: 0 % (ref 0–1.9)
BILIRUB SERPL-MCNC: 0.3 MG/DL (ref 0.1–1)
BUN SERPL-MCNC: 43 MG/DL (ref 8–23)
CALCIUM SERPL-MCNC: 9.4 MG/DL (ref 8.7–10.5)
CHLORIDE SERPL-SCNC: 105 MMOL/L (ref 95–110)
CO2 SERPL-SCNC: 25 MMOL/L (ref 23–29)
CREAT SERPL-MCNC: 2.5 MG/DL (ref 0.5–1.4)
DACRYOCYTES BLD QL SMEAR: ABNORMAL
DIFFERENTIAL METHOD: ABNORMAL
EOSINOPHIL # BLD AUTO: ABNORMAL K/UL (ref 0–0.5)
EOSINOPHIL NFR BLD: 3 % (ref 0–8)
ERYTHROCYTE [DISTWIDTH] IN BLOOD BY AUTOMATED COUNT: 15.1 % (ref 11.5–14.5)
EST. GFR  (NO RACE VARIABLE): 27 ML/MIN/1.73 M^2
FERRITIN SERPL-MCNC: 450 NG/ML (ref 20–300)
FOLATE SERPL-MCNC: 14.2 NG/ML (ref 4–24)
GLUCOSE SERPL-MCNC: 292 MG/DL (ref 70–110)
HAPTOGLOB SERPL-MCNC: 214 MG/DL (ref 30–250)
HCT VFR BLD AUTO: 35.3 % (ref 40–54)
HGB BLD-MCNC: 10.6 G/DL (ref 14–18)
IMM GRANULOCYTES # BLD AUTO: ABNORMAL K/UL (ref 0–0.04)
IMM GRANULOCYTES NFR BLD AUTO: ABNORMAL % (ref 0–0.5)
IRON SERPL-MCNC: 58 UG/DL (ref 45–160)
LDH SERPL L TO P-CCNC: 493 U/L (ref 110–260)
LEFT EYE DM RETINOPATHY: POSITIVE
LYMPHOCYTES # BLD AUTO: ABNORMAL K/UL (ref 1–4.8)
LYMPHOCYTES NFR BLD: 33 % (ref 18–48)
MCH RBC QN AUTO: 25.7 PG (ref 27–31)
MCHC RBC AUTO-ENTMCNC: 30 G/DL (ref 32–36)
MCV RBC AUTO: 86 FL (ref 82–98)
MONOCYTES # BLD AUTO: ABNORMAL K/UL (ref 0.3–1)
MONOCYTES NFR BLD: 10 % (ref 4–15)
NEUTROPHILS NFR BLD: 54 % (ref 38–73)
NRBC BLD-RTO: 0 /100 WBC
OVALOCYTES BLD QL SMEAR: ABNORMAL
PATH REV BLD -IMP: NORMAL
PATH REV BLD -IMP: NORMAL
PLATELET # BLD AUTO: 180 K/UL (ref 150–450)
PMV BLD AUTO: 11.6 FL (ref 9.2–12.9)
POIKILOCYTOSIS BLD QL SMEAR: SLIGHT
POTASSIUM SERPL-SCNC: 4.2 MMOL/L (ref 3.5–5.1)
PROT SERPL-MCNC: 6.4 G/DL (ref 6–8.4)
RBC # BLD AUTO: 4.12 M/UL (ref 4.6–6.2)
RETICS/RBC NFR AUTO: 1.7 % (ref 0.4–2)
RIGHT EYE DM RETINOPATHY: POSITIVE
SATURATED IRON: 21 % (ref 20–50)
SODIUM SERPL-SCNC: 143 MMOL/L (ref 136–145)
TOTAL IRON BINDING CAPACITY: 272 UG/DL (ref 250–450)
TRANSFERRIN SERPL-MCNC: 184 MG/DL (ref 200–375)
VIT B12 SERPL-MCNC: >2000 PG/ML (ref 210–950)
WBC # BLD AUTO: 3.11 K/UL (ref 3.9–12.7)

## 2023-11-17 PROCEDURE — 99214 PR OFFICE/OUTPT VISIT, EST, LEVL IV, 30-39 MIN: ICD-10-PCS | Mod: S$GLB,,, | Performed by: OPHTHALMOLOGY

## 2023-11-17 PROCEDURE — 85027 COMPLETE CBC AUTOMATED: CPT | Performed by: INTERNAL MEDICINE

## 2023-11-17 PROCEDURE — 82728 ASSAY OF FERRITIN: CPT | Performed by: INTERNAL MEDICINE

## 2023-11-17 PROCEDURE — 99999 PR PBB SHADOW E&M-EST. PATIENT-LVL IV: ICD-10-PCS | Mod: PBBFAC,,, | Performed by: OPHTHALMOLOGY

## 2023-11-17 PROCEDURE — 3044F HG A1C LEVEL LT 7.0%: CPT | Mod: CPTII,S$GLB,, | Performed by: OPHTHALMOLOGY

## 2023-11-17 PROCEDURE — 85060 PATHOLOGIST REVIEW: ICD-10-PCS | Mod: ,,, | Performed by: PATHOLOGY

## 2023-11-17 PROCEDURE — 1126F PR PAIN SEVERITY QUANTIFIED, NO PAIN PRESENT: ICD-10-PCS | Mod: CPTII,S$GLB,, | Performed by: OPHTHALMOLOGY

## 2023-11-17 PROCEDURE — 99214 OFFICE O/P EST MOD 30 MIN: CPT | Mod: S$GLB,,, | Performed by: OPHTHALMOLOGY

## 2023-11-17 PROCEDURE — 3061F PR NEG MICROALBUMINURIA RESULT DOCUMENTED/REVIEW: ICD-10-PCS | Mod: CPTII,S$GLB,, | Performed by: OPHTHALMOLOGY

## 2023-11-17 PROCEDURE — 2022F PR DILATED RETINAL EYE EXAM WITH INTERP/REVIEW: ICD-10-PCS | Mod: CPTII,S$GLB,, | Performed by: OPHTHALMOLOGY

## 2023-11-17 PROCEDURE — 83010 ASSAY OF HAPTOGLOBIN QUANT: CPT | Performed by: INTERNAL MEDICINE

## 2023-11-17 PROCEDURE — 84466 ASSAY OF TRANSFERRIN: CPT | Performed by: INTERNAL MEDICINE

## 2023-11-17 PROCEDURE — 1160F PR REVIEW ALL MEDS BY PRESCRIBER/CLIN PHARMACIST DOCUMENTED: ICD-10-PCS | Mod: CPTII,S$GLB,, | Performed by: OPHTHALMOLOGY

## 2023-11-17 PROCEDURE — 3044F PR MOST RECENT HEMOGLOBIN A1C LEVEL <7.0%: ICD-10-PCS | Mod: CPTII,S$GLB,, | Performed by: OPHTHALMOLOGY

## 2023-11-17 PROCEDURE — 2022F DILAT RTA XM EVC RTNOPTHY: CPT | Mod: CPTII,S$GLB,, | Performed by: OPHTHALMOLOGY

## 2023-11-17 PROCEDURE — 80053 COMPREHEN METABOLIC PANEL: CPT | Performed by: INTERNAL MEDICINE

## 2023-11-17 PROCEDURE — 1160F RVW MEDS BY RX/DR IN RCRD: CPT | Mod: CPTII,S$GLB,, | Performed by: OPHTHALMOLOGY

## 2023-11-17 PROCEDURE — 82607 VITAMIN B-12: CPT | Performed by: INTERNAL MEDICINE

## 2023-11-17 PROCEDURE — 3066F NEPHROPATHY DOC TX: CPT | Mod: CPTII,S$GLB,, | Performed by: OPHTHALMOLOGY

## 2023-11-17 PROCEDURE — 85007 BL SMEAR W/DIFF WBC COUNT: CPT | Performed by: INTERNAL MEDICINE

## 2023-11-17 PROCEDURE — 82746 ASSAY OF FOLIC ACID SERUM: CPT | Performed by: INTERNAL MEDICINE

## 2023-11-17 PROCEDURE — 85060 BLOOD SMEAR INTERPRETATION: CPT | Mod: ,,, | Performed by: PATHOLOGY

## 2023-11-17 PROCEDURE — 82525 ASSAY OF COPPER: CPT | Performed by: INTERNAL MEDICINE

## 2023-11-17 PROCEDURE — 99999 PR PBB SHADOW E&M-EST. PATIENT-LVL IV: CPT | Mod: PBBFAC,,, | Performed by: OPHTHALMOLOGY

## 2023-11-17 PROCEDURE — 83615 LACTATE (LD) (LDH) ENZYME: CPT | Performed by: INTERNAL MEDICINE

## 2023-11-17 PROCEDURE — 4010F ACE/ARB THERAPY RXD/TAKEN: CPT | Mod: CPTII,S$GLB,, | Performed by: OPHTHALMOLOGY

## 2023-11-17 PROCEDURE — 92134 CPTRZ OPH DX IMG PST SGM RTA: CPT | Mod: S$GLB,,, | Performed by: OPHTHALMOLOGY

## 2023-11-17 PROCEDURE — 85045 AUTOMATED RETICULOCYTE COUNT: CPT | Performed by: INTERNAL MEDICINE

## 2023-11-17 PROCEDURE — 1126F AMNT PAIN NOTED NONE PRSNT: CPT | Mod: CPTII,S$GLB,, | Performed by: OPHTHALMOLOGY

## 2023-11-17 PROCEDURE — 92134 POSTERIOR SEGMENT OCT RETINA (OCULAR COHERENCE TOMOGRAPHY)-BOTH EYES: ICD-10-PCS | Mod: S$GLB,,, | Performed by: OPHTHALMOLOGY

## 2023-11-17 PROCEDURE — 1159F MED LIST DOCD IN RCRD: CPT | Mod: CPTII,S$GLB,, | Performed by: OPHTHALMOLOGY

## 2023-11-17 PROCEDURE — 1159F PR MEDICATION LIST DOCUMENTED IN MEDICAL RECORD: ICD-10-PCS | Mod: CPTII,S$GLB,, | Performed by: OPHTHALMOLOGY

## 2023-11-17 PROCEDURE — 83540 ASSAY OF IRON: CPT | Performed by: INTERNAL MEDICINE

## 2023-11-17 PROCEDURE — 4010F PR ACE/ARB THEARPY RXD/TAKEN: ICD-10-PCS | Mod: CPTII,S$GLB,, | Performed by: OPHTHALMOLOGY

## 2023-11-17 PROCEDURE — 3066F PR DOCUMENTATION OF TREATMENT FOR NEPHROPATHY: ICD-10-PCS | Mod: CPTII,S$GLB,, | Performed by: OPHTHALMOLOGY

## 2023-11-17 PROCEDURE — 3061F NEG MICROALBUMINURIA REV: CPT | Mod: CPTII,S$GLB,, | Performed by: OPHTHALMOLOGY

## 2023-11-17 NOTE — PROGRESS NOTES
===============================  Date today is 11/17/2023  Mitch Whittaker is a 67 y.o. male  Last visit [facility]: :5/16/2023   Last visit eye dept. Visit date not found    Uncorrected distance visual acuity was 20/20 in the right eye and 20/20 in the left eye.  Tonometry       Tonometry (Applanation, 10:03 AM)         Right Left    Pressure 14 14                  Not recorded       Not recorded       Not recorded       Chief Complaint   Patient presents with    Diabetes     6 months check up     HPI     Diabetes     Additional comments: 6 months check up           Comments    DM since approx 1996  PDR S/P PRP OU  Old Focal OU  Old OS TRD  ERM OU  PCIOL OU w/YAG OS 3/21/19  KIDNEY TRANSPLANT 6/12/15          Last edited by Kathie Boyd on 11/17/2023  9:43 AM.      Problem List Items Addressed This Visit          Eye/Vision problems    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, with long-term current use of insulin - Primary    Overview     DM since 1996  H/o PRP and Focal OU  OS TRD  H/O Kidney transplant 6/12/15  A1C 8.0 12/19/19         Relevant Orders    Posterior Segment OCT Retina-Both eyes (Completed)    Pseudophakia    Overview     2005 Dr. Merlos         Epiretinal membrane (ERM) of both eyes    Relevant Orders    Posterior Segment OCT Retina-Both eyes (Completed)       Other    Kidney transplanted     Instructed to call 24/7 for any worsening of vision, visual distortion or pain.  Check OU independently daily.    Gave my office and personal cell phone number.  ________________  11/17/2023 today  Mitch Whittaker    :DM with stable retinopathy  S/p prp    Last vist scaphoid heme - today resolved    Oct great   doing well   PCIOL OU  Post focal OU  Post PRP OU  ERM OU stable        RTC 1 year   Instructed to call 24/7 for any worsening of vision or symptoms. Check OU daily.   Gave my office and cell phone number.  no active dr  =============================

## 2023-11-20 ENCOUNTER — LAB VISIT (OUTPATIENT)
Dept: LAB | Facility: HOSPITAL | Age: 68
End: 2023-11-20
Attending: INTERNAL MEDICINE
Payer: COMMERCIAL

## 2023-11-20 DIAGNOSIS — Z94.0 KIDNEY REPLACED BY TRANSPLANT: ICD-10-CM

## 2023-11-20 LAB
BASOPHILS # BLD AUTO: 0.02 K/UL (ref 0–0.2)
BASOPHILS NFR BLD: 0.7 % (ref 0–1.9)
DIFFERENTIAL METHOD: ABNORMAL
EOSINOPHIL # BLD AUTO: 0 K/UL (ref 0–0.5)
EOSINOPHIL NFR BLD: 0.7 % (ref 0–8)
ERYTHROCYTE [DISTWIDTH] IN BLOOD BY AUTOMATED COUNT: 15.6 % (ref 11.5–14.5)
HCT VFR BLD AUTO: 33.9 % (ref 40–54)
HGB BLD-MCNC: 9.8 G/DL (ref 14–18)
IMM GRANULOCYTES # BLD AUTO: 0.09 K/UL (ref 0–0.04)
IMM GRANULOCYTES NFR BLD AUTO: 3 % (ref 0–0.5)
LYMPHOCYTES # BLD AUTO: 0.8 K/UL (ref 1–4.8)
LYMPHOCYTES NFR BLD: 26.9 % (ref 18–48)
MCH RBC QN AUTO: 25.3 PG (ref 27–31)
MCHC RBC AUTO-ENTMCNC: 28.9 G/DL (ref 32–36)
MCV RBC AUTO: 87 FL (ref 82–98)
MONOCYTES # BLD AUTO: 0.4 K/UL (ref 0.3–1)
MONOCYTES NFR BLD: 12.3 % (ref 4–15)
NEUTROPHILS # BLD AUTO: 1.7 K/UL (ref 1.8–7.7)
NEUTROPHILS NFR BLD: 56.4 % (ref 38–73)
NRBC BLD-RTO: 1 /100 WBC
PLATELET # BLD AUTO: 172 K/UL (ref 150–450)
PMV BLD AUTO: 12.7 FL (ref 9.2–12.9)
RBC # BLD AUTO: 3.88 M/UL (ref 4.6–6.2)
WBC # BLD AUTO: 3.01 K/UL (ref 3.9–12.7)

## 2023-11-20 PROCEDURE — 36415 COLL VENOUS BLD VENIPUNCTURE: CPT | Performed by: INTERNAL MEDICINE

## 2023-11-20 PROCEDURE — 85025 COMPLETE CBC W/AUTO DIFF WBC: CPT | Performed by: INTERNAL MEDICINE

## 2023-11-21 LAB — COPPER SERPL-MCNC: 991 UG/L (ref 665–1480)

## 2023-11-23 DIAGNOSIS — R05.8 ALLERGIC COUGH: ICD-10-CM

## 2023-11-24 RX ORDER — MONTELUKAST SODIUM 10 MG/1
10 TABLET ORAL NIGHTLY
Qty: 90 TABLET | Refills: 1 | Status: SHIPPED | OUTPATIENT
Start: 2023-11-24

## 2023-11-24 NOTE — TELEPHONE ENCOUNTER
No care due was identified.  [facility] Embedded Care Due Messages. Reference number: [id number].   11/23/2023 8:52:28 PM CST

## 2023-11-24 NOTE — TELEPHONE ENCOUNTER
Refill Decision Note   Mitch Whittaker  is requesting a refill authorization.  Brief Assessment and Rationale for Refill:  Approve     Medication Therapy Plan:         Comments:     Note composed:9:54 AM 11/24/2023

## 2023-11-27 ENCOUNTER — TELEPHONE (OUTPATIENT)
Dept: PREADMISSION TESTING | Facility: HOSPITAL | Age: 68
End: 2023-11-27
Payer: COMMERCIAL

## 2023-11-27 ENCOUNTER — TELEPHONE (OUTPATIENT)
Dept: TRANSPLANT | Facility: CLINIC | Age: 68
End: 2023-11-27
Payer: COMMERCIAL

## 2023-11-27 NOTE — TELEPHONE ENCOUNTER
----- Message from Hany Gonsalez MD sent at 11/24/2023  8:22 PM CST -----  Regarding: RE: surgery dept  Pt is cleared for carpel tunnel surgery.  ----- Message -----  From: Nu Flores RN  Sent: 11/24/2023   1:48 PM CST  To: Hany Gonsalez MD; Raeann Glaser NP  Subject: surgery dept                                     Hello,   This pt will be having carpal tunnel surgery on 12/1/23 with Dr Almonte. We have reviewed the patient's medical history and are requesting a Transplant clearance note. Patient last saw you on 11/13/23, please advise on whether this patient can be cleared to proceed from your standpoint or will need any further testing to ensure they are optimized to proceed.        -Thanks in advance,  [facility].  [phone] or [phone]  Mon-Fri 7:30 am- 4 pm (Closed Major Holidays)

## 2023-11-27 NOTE — TELEPHONE ENCOUNTER
Incoming call from patient and wife Michelle Cormier and notified that labs have been reschedule for 12/19.  Agrees with plan.  ----- Message from Raeann Glaser NP sent at 11/27/2023  8:12 AM CST -----  Regarding: RE: surgery dept  Mr. Whittaker was last seen in transplant clinic on 11/13/23. His renal function is at baseline. Patient to continue his immunosuppression medications, no need to hold. Sean, please get transplant labs 1-2 weeks following surgery. Patient is cleared from kidney transplant standpoint for carpal tunnel surgery that is scheduled on 12/1/23 .    Raeann Glaser NP-C  Kidney Transplant     ----- Message -----  From: Nu Flores RN  Sent: 11/24/2023   1:48 PM CST  To: Hany Gonsalez MD; Raeann Glaser NP  Subject: surgery dept                                     Hello,   This pt will be having carpal tunnel surgery on 12/1/23 with Dr Almonte. We have reviewed the patient's medical history and are requesting a Transplant clearance note. Patient last saw you on 11/13/23, please advise on whether this patient can be cleared to proceed from your standpoint or will need any further testing to ensure they are optimized to proceed.        -Thanks in advance,  [facility]  [phone] or [phone]  Mon-Fri 7:30 am- 4 pm (Closed Major Holidays)

## 2023-11-27 NOTE — TELEPHONE ENCOUNTER
Pre op instructions reviewed with pt over telephone, verbalized understanding.    To confirm, Surgery is scheduled on 12/1/23. We will call you late afternoon the business day prior to surgery with your arrival time.    *Please report to the [facility] (1st Floor) located off of [facility] (2nd Entrance/Building on the left, in front of the flag pole).  Address: [address] [place] [address] [place] [id number]      INSTRUCTIONS IMPORTANT!!!  DO NOT Eat, Drink, or Smoke after 12 midnight unless instructed otherwise by your Surgeon. OK to brush teeth, no gum, candy or mints!    >>>MEDICATION INSTRUCTIONS<<<: Morning of Surgery, take small sip of water with ONLY these medications:  Cellcept  Prograf  Flomax  Prednisone  Metoprolol      *Blood Thinners: Stop taking Eliquis 2 days prior to surgery per Physician Instructions! Call your Surgeon office to inquire about any questions regarding your blood thinner medication.    *Diabetic Patients: If you take diabetic or weight loss medication, Do NOT take morning of surgery unless instructed by Doctor. Metformin to be stopped 24 hrs prior to surgery. Ozempic/ Mounjaro/ Wegovy/ Trulicity/ Semaglutide injections or weight loss medication to be stopped 7 days prior to surgery. DO NOT take long-acting insulin the evening before surgery. Blood sugars will be checked in pre-op by Nurse.    *Patients should HOLD all vitamins, herbal supplements, weight loss medication, aspirin products & NSAIDS 7 days prior to surgery, as these can thin the blood. Ok to take Tylenol.    ____  Avoid Alcoholic beverages 3 days prior to surgery, as it can thin the blood.  ____  NO Acrylic/fake nails or nail polish worn day of surgery (specifically hand/arm & foot surgeries).  ____  NO powder, lotions, deodorants, oils or cream on body.  ____  Remove all jewelry & piercings & foreign objects before arrival & leave at home.  ____  Remove Dentures, Hearing Aids & Contact Lens prior to  surgery.  ____  Bring photo ID and insurance information to hospital (Leave Valuables at Home).  ____  If going home the same day, arrange for a ride home. You will not be able to drive for 24 hrs if Anesthesia was used.   ____  Females (ages 11-60): may need to give a urine sample the morning of surgery; please see Pre op Nurse prior to using the restroom.  ____  Males: Stop ED medications (Viagra, Cialis) 24 hrs prior to surgery.  ____  Wear clean, loose fitting clothing to allow for dressings/ bandages.      Bathing Instructions:    -Shower with anti-bacterial Soap (Hibiclens or Dial) the night before surgery and the morning of.   -Do not use Hibiclens on your face or genitals.   -Apply clean clothes after shower.  -Do not shave your face or body 2 days prior to surgery unless instructed otherwise by your Surgeon.  -Do not shave pubic hair 7 days prior to surgery (gyn pt's).    [facility] Visitor/Ride Policy:  Only 2 adults allowed in pre op/recovery area during your procedure. You MUST HAVE A RIDE HOME from a responsible adult that you know and trust. Medical Transport, Uber or Lyft can ONLY be used if patient has a responsible adult to accompany them during ride home.       *Signs and symptoms of Infection Before or After Surgery:               !!!If you experience any fever, chills, nausea/ vomiting, foul odor/ excessive drainage from surgical site, flu-like symptoms, new wounds or cuts, PLEASE CALL THE SURGEON OFFICE at [phone] or SEND MESSAGE THROUGH [facility] PORTAL!!!       *If you are running late or have questions the morning of surgery, please call the Hospital Surgery Dept @ [phone].     *Billing questions:  [phone]       Thank you,  -[facility]  [phone] or [phone]  M-F 7:30 am-4:00 pm (Closed Major Holidays)

## 2023-11-27 NOTE — TELEPHONE ENCOUNTER
----- Message from Raeann Glaser NP sent at 11/27/2023  8:12 AM CST -----  Regarding: RE: surgery dept  Mr. Whittaker was last seen in transplant clinic on 11/13/23. His renal function is at baseline. Patient to continue his immunosuppression medications, no need to hold. Sean, please get transplant labs 1-2 weeks following surgery. Patient is cleared from kidney transplant standpoint for carpal tunnel surgery that is scheduled on 12/1/23 .    Raeann Glaser NP-C  Kidney Transplant     ----- Message -----  From: Nu Flores RN  Sent: 11/24/2023   1:48 PM CST  To: Hany Gonsalez MD; Raeann Glaser NP  Subject: surgery dept                                     Hello,   This pt will be having carpal tunnel surgery on 12/1/23 with Dr Almonte. We have reviewed the patient's medical history and are requesting a Transplant clearance note. Patient last saw you on 11/13/23, please advise on whether this patient can be cleared to proceed from your standpoint or will need any further testing to ensure they are optimized to proceed.        -Thanks in advance,  [facility]  [phone] or [phone]  Mon-Fri 7:30 am- 4 pm (Closed Major Holidays)

## 2023-11-27 NOTE — TELEPHONE ENCOUNTER
Labs scheduled for 12/19.  Left voice message to call coordinator.    ----- Message from Raeann Glaser NP sent at 11/27/2023  8:12 AM CST -----  Regarding: RE: surgery dept  Mr. Whittaker was last seen in transplant clinic on 11/13/23. His renal function is at baseline. Patient to continue his immunosuppression medications, no need to hold. Sean, please get transplant labs 1-2 weeks following surgery. Patient is cleared from kidney transplant standpoint for carpal tunnel surgery that is scheduled on 12/1/23 .    Raeann Glaser NP-C  Kidney Transplant     ----- Message -----  From: Nu Flores RN  Sent: 11/24/2023   1:48 PM CST  To: Hany Gonsalez MD; Raeann Glaser NP  Subject: surgery dept                                     Hello,   This pt will be having carpal tunnel surgery on 12/1/23 with Dr Almonte. We have reviewed the patient's medical history and are requesting a Transplant clearance note. Patient last saw you on 11/13/23, please advise on whether this patient can be cleared to proceed from your standpoint or will need any further testing to ensure they are optimized to proceed.        -Thanks in advance,  [facility]  [phone] or [phone]  Mon-Fri 7:30 am- 4 pm (Closed Major Holidays)

## 2023-11-28 NOTE — TELEPHONE ENCOUNTER
Refill Routing Note   Medication(s) are not appropriate for processing by [facility] for the following reason(s):        ED/Hospital Visit since last OV with provider  Required labs outdated  Required labs abnormal    [facility] action(s):  Defer               Appointments  past 12m or future 3m with PCP    Date Provider   Last Visit   3/28/2023 Alix Kowalski DO   Next Visit   3/14/2024 Alix Kowalski DO   ED visits in past 90 days: 0        Note composed:10:52 AM 11/28/2023

## 2023-11-28 NOTE — TELEPHONE ENCOUNTER
No care due was identified.  [facility] Embedded Care Due Messages. Reference number: [id number].   11/28/2023 5:08:54 AM CST

## 2023-11-29 ENCOUNTER — PATIENT OUTREACH (OUTPATIENT)
Dept: ADMINISTRATIVE | Facility: HOSPITAL | Age: 68
End: 2023-11-29
Payer: COMMERCIAL

## 2023-11-29 RX ORDER — GLIMEPIRIDE 2 MG/1
2 TABLET ORAL
Qty: 90 TABLET | Refills: 0 | Status: SHIPPED | OUTPATIENT
Start: 2023-11-29 | End: 2024-03-03 | Stop reason: SDUPTHER

## 2023-11-30 ENCOUNTER — TELEPHONE (OUTPATIENT)
Dept: PREADMISSION TESTING | Facility: HOSPITAL | Age: 68
End: 2023-11-30
Payer: COMMERCIAL

## 2023-11-30 NOTE — TELEPHONE ENCOUNTER
Patient daughter called PAT DEPT back to confirm surgery arrival time of 9 am tomorrow and npo after mn.

## 2023-11-30 NOTE — TELEPHONE ENCOUNTER
Called and LVM for pt about the following:     Please arrive to [facility] ([facility] at 9 am on 12/1/23 for your scheduled procedure.  Address: [address] [place] [address] [place] [address] (2nd Building on left, 1st Floor Lobby)  >>>NO eating or drinking after midnight unless instructed otherwise by your Surgeon<<<    Thank you,  -[facility]  Mon-Fri 7:30 am - 4 pm [phone]

## 2023-12-01 ENCOUNTER — HOSPITAL ENCOUNTER (OUTPATIENT)
Facility: HOSPITAL | Age: 68
Discharge: HOME | End: 2023-12-01
Attending: ORTHOPAEDIC SURGERY | Admitting: ORTHOPAEDIC SURGERY
Payer: COMMERCIAL

## 2023-12-01 ENCOUNTER — ANESTHESIA (OUTPATIENT)
Dept: SURGERY | Facility: HOSPITAL | Age: 68
End: 2023-12-01
Payer: COMMERCIAL

## 2023-12-01 ENCOUNTER — ANESTHESIA EVENT (OUTPATIENT)
Dept: SURGERY | Facility: HOSPITAL | Age: 68
End: 2023-12-01
Payer: COMMERCIAL

## 2023-12-01 DIAGNOSIS — G56.03 BILATERAL CARPAL TUNNEL SYNDROME: Primary | ICD-10-CM

## 2023-12-01 DIAGNOSIS — G56.01 RIGHT CARPAL TUNNEL SYNDROME: ICD-10-CM

## 2023-12-01 LAB
POCT GLUCOSE: 114 MG/DL (ref 70–110)
POCT GLUCOSE: 138 MG/DL (ref 70–110)
POCT GLUCOSE: 52 MG/DL (ref 70–110)
POCT GLUCOSE: 54 MG/DL (ref 70–110)

## 2023-12-01 PROCEDURE — 36000706: Performed by: ORTHOPAEDIC SURGERY

## 2023-12-01 PROCEDURE — 63600175 PHARM REV CODE 636 W HCPCS: Performed by: NURSE ANESTHETIST, CERTIFIED REGISTERED

## 2023-12-01 PROCEDURE — 25000003 PHARM REV CODE 250: Performed by: ORTHOPAEDIC SURGERY

## 2023-12-01 PROCEDURE — 36000707: Performed by: ORTHOPAEDIC SURGERY

## 2023-12-01 PROCEDURE — 64721 CARPAL TUNNEL SURGERY: CPT | Mod: RT,,, | Performed by: ORTHOPAEDIC SURGERY

## 2023-12-01 PROCEDURE — 25000003 PHARM REV CODE 250: Performed by: NURSE ANESTHETIST, CERTIFIED REGISTERED

## 2023-12-01 PROCEDURE — 37000008 HC ANESTHESIA 1ST 15 MINUTES: Performed by: ORTHOPAEDIC SURGERY

## 2023-12-01 PROCEDURE — 71000015 HC POSTOP RECOV 1ST HR: Performed by: ORTHOPAEDIC SURGERY

## 2023-12-01 PROCEDURE — 37000009 HC ANESTHESIA EA ADD 15 MINS: Performed by: ORTHOPAEDIC SURGERY

## 2023-12-01 PROCEDURE — 64721 PR REVISE MEDIAN N/CARPAL TUNNEL SURG: ICD-10-PCS | Mod: RT,,, | Performed by: ORTHOPAEDIC SURGERY

## 2023-12-01 PROCEDURE — 71000033 HC RECOVERY, INTIAL HOUR: Performed by: ORTHOPAEDIC SURGERY

## 2023-12-01 RX ORDER — SODIUM CHLORIDE 0.9 % (FLUSH) 0.9 %
10 SYRINGE (ML) INJECTION
Status: DISCONTINUED | OUTPATIENT
Start: 2023-12-01 | End: 2023-12-01 | Stop reason: HOSPADM

## 2023-12-01 RX ORDER — CHLORHEXIDINE GLUCONATE ORAL RINSE 1.2 MG/ML
10 SOLUTION DENTAL 2 TIMES DAILY
Status: CANCELLED | OUTPATIENT
Start: 2023-12-01 | End: 2023-12-06

## 2023-12-01 RX ORDER — HYDROCODONE BITARTRATE AND ACETAMINOPHEN 5; 325 MG/1; MG/1
1 TABLET ORAL EVERY 6 HOURS PRN
Qty: 15 TABLET | Refills: 0 | Status: SHIPPED | OUTPATIENT
Start: 2023-12-01

## 2023-12-01 RX ORDER — FENTANYL CITRATE 50 UG/ML
INJECTION, SOLUTION INTRAMUSCULAR; INTRAVENOUS
Status: DISCONTINUED | OUTPATIENT
Start: 2023-12-01 | End: 2023-12-01

## 2023-12-01 RX ORDER — PROPOFOL 10 MG/ML
VIAL (ML) INTRAVENOUS CONTINUOUS PRN
Status: DISCONTINUED | OUTPATIENT
Start: 2023-12-01 | End: 2023-12-01

## 2023-12-01 RX ORDER — SODIUM CHLORIDE 0.9 % (FLUSH) 0.9 %
3 SYRINGE (ML) INJECTION
Status: DISCONTINUED | OUTPATIENT
Start: 2023-12-01 | End: 2023-12-01 | Stop reason: HOSPADM

## 2023-12-01 RX ORDER — LIDOCAINE HYDROCHLORIDE 20 MG/ML
INJECTION INTRAVENOUS
Status: DISCONTINUED | OUTPATIENT
Start: 2023-12-01 | End: 2023-12-01

## 2023-12-01 RX ORDER — ONDANSETRON 2 MG/ML
INJECTION INTRAMUSCULAR; INTRAVENOUS
Status: DISCONTINUED | OUTPATIENT
Start: 2023-12-01 | End: 2023-12-01

## 2023-12-01 RX ORDER — ONDANSETRON 2 MG/ML
4 INJECTION INTRAMUSCULAR; INTRAVENOUS DAILY PRN
Status: DISCONTINUED | OUTPATIENT
Start: 2023-12-01 | End: 2023-12-01 | Stop reason: HOSPADM

## 2023-12-01 RX ORDER — PROPOFOL 10 MG/ML
VIAL (ML) INTRAVENOUS
Status: DISCONTINUED | OUTPATIENT
Start: 2023-12-01 | End: 2023-12-01

## 2023-12-01 RX ORDER — ALBUTEROL SULFATE 0.83 MG/ML
2.5 SOLUTION RESPIRATORY (INHALATION) EVERY 4 HOURS PRN
Status: DISCONTINUED | OUTPATIENT
Start: 2023-12-01 | End: 2023-12-01 | Stop reason: HOSPADM

## 2023-12-01 RX ORDER — LIDOCAINE HYDROCHLORIDE 20 MG/ML
INJECTION, SOLUTION EPIDURAL; INFILTRATION; INTRACAUDAL; PERINEURAL
Status: DISCONTINUED | OUTPATIENT
Start: 2023-12-01 | End: 2023-12-01 | Stop reason: HOSPADM

## 2023-12-01 RX ORDER — HYDROCODONE BITARTRATE AND ACETAMINOPHEN 5; 325 MG/1; MG/1
1 TABLET ORAL EVERY 4 HOURS PRN
Status: CANCELLED | OUTPATIENT
Start: 2023-12-01

## 2023-12-01 RX ORDER — PROCHLORPERAZINE EDISYLATE 5 MG/ML
5 INJECTION INTRAMUSCULAR; INTRAVENOUS EVERY 30 MIN PRN
Status: DISCONTINUED | OUTPATIENT
Start: 2023-12-01 | End: 2023-12-01 | Stop reason: HOSPADM

## 2023-12-01 RX ORDER — HYDROMORPHONE HYDROCHLORIDE 2 MG/ML
0.2 INJECTION, SOLUTION INTRAMUSCULAR; INTRAVENOUS; SUBCUTANEOUS EVERY 5 MIN PRN
Status: DISCONTINUED | OUTPATIENT
Start: 2023-12-01 | End: 2023-12-01 | Stop reason: HOSPADM

## 2023-12-01 RX ORDER — OXYCODONE HYDROCHLORIDE 5 MG/1
5 TABLET ORAL
Status: DISCONTINUED | OUTPATIENT
Start: 2023-12-01 | End: 2023-12-01 | Stop reason: HOSPADM

## 2023-12-01 RX ORDER — DIPHENHYDRAMINE HYDROCHLORIDE 50 MG/ML
25 INJECTION INTRAMUSCULAR; INTRAVENOUS EVERY 6 HOURS PRN
Status: DISCONTINUED | OUTPATIENT
Start: 2023-12-01 | End: 2023-12-01 | Stop reason: HOSPADM

## 2023-12-01 RX ORDER — CHLORHEXIDINE GLUCONATE ORAL RINSE 1.2 MG/ML
10 SOLUTION DENTAL
Status: ACTIVE | OUTPATIENT
Start: 2023-12-01

## 2023-12-01 RX ORDER — CEFAZOLIN SODIUM 1 G/3ML
INJECTION, POWDER, FOR SOLUTION INTRAMUSCULAR; INTRAVENOUS
Status: DISCONTINUED | OUTPATIENT
Start: 2023-12-01 | End: 2023-12-01

## 2023-12-01 RX ORDER — MEPERIDINE HYDROCHLORIDE 25 MG/ML
12.5 INJECTION INTRAMUSCULAR; INTRAVENOUS; SUBCUTANEOUS ONCE
Status: DISCONTINUED | OUTPATIENT
Start: 2023-12-01 | End: 2023-12-01 | Stop reason: HOSPADM

## 2023-12-01 RX ADMIN — Medication 50 MG: at 09:12

## 2023-12-01 RX ADMIN — PROPOFOL 50 MCG/KG/MIN: 10 INJECTION, EMULSION INTRAVENOUS at 09:12

## 2023-12-01 RX ADMIN — DEXTROSE MONOHYDRATE 250 ML: 100 INJECTION, SOLUTION INTRAVENOUS at 09:12

## 2023-12-01 RX ADMIN — SODIUM CHLORIDE, POTASSIUM CHLORIDE, SODIUM LACTATE AND CALCIUM CHLORIDE: 600; 310; 30; 20 INJECTION, SOLUTION INTRAVENOUS at 09:12

## 2023-12-01 RX ADMIN — LIDOCAINE HYDROCHLORIDE 100 MG: 20 INJECTION INTRAVENOUS at 09:12

## 2023-12-01 RX ADMIN — ONDANSETRON 4 MG: 2 INJECTION INTRAMUSCULAR; INTRAVENOUS at 09:12

## 2023-12-01 RX ADMIN — CEFAZOLIN 2 G: 330 INJECTION, POWDER, FOR SOLUTION INTRAMUSCULAR; INTRAVENOUS at 09:12

## 2023-12-01 RX ADMIN — FENTANYL CITRATE 25 MCG: 50 INJECTION, SOLUTION INTRAMUSCULAR; INTRAVENOUS at 09:12

## 2023-12-01 NOTE — H&P
Subjective:     Patient ID: Mitch Whittaker is a 67 y.o. male.    Chief Complaint: Pain and Swelling of the Right Hand and Pain and Swelling of the Right Wrist      HPI:  The patient is a 67-year-old male who hit his right hand on a [place] would bottle of water and had onset of swelling and pain 07/06/2023.  He had nerve conduction studies done April 2023 that did confirm bilateral carpal tunnel syndrome.  This episode of pain and swelling made his symptoms worse.  He wishes to have a right carpal tunnel release.    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 RNA NEGATIVE 5/2015, 2021, 2022    History of colon polyps     HPTH (hyperparathyroidism)     Hyperlipidemia     Hypertension associated with stage 3 chronic kidney disease due to type 2 diabetes mellitus     LBBB (left bundle branch block) 12/20/2021    Morbid obesity with BMI of 45.0-49.9, adult     Nephrolithiasis 6/7/2013    PCO (posterior capsular opacification), left 03/04/2019    Proteinuria     resolved s/p transplant    S/P kidney transplant     Sleep apnea     Type 2 diabetes, uncontrolled, with retinopathy     Type II diabetes mellitus with renal manifestations      Past Surgical History:   Procedure Laterality Date     CARDIAC CATHETERIZATION  2008    normal coronary    COLONOSCOPY N/A 4/5/2018    Procedure: COLONOSCOPY;  Surgeon: Chava Ronquillo MD;  Location: [facility] ENDO;  Service: Endoscopy;  Laterality: N/A;    COLONOSCOPY N/A 5/2/2022    Procedure: COLONOSCOPY;  Surgeon: Alix Puente MD;  Location: [facility] ENDO;  Service: Endoscopy;  Laterality: N/A;    COLONOSCOPY N/A 6/7/2023    Procedure: COLONOSCOPY - rule out CMV  Cardiac clearance/Eliquis hold approval received on 05/21/23 per Dr. Meade, cardiology.  Note in encounters.  LB;  Surgeon: Daniella Shah MD;  Location: [facility] ENDO;  Service: Endoscopy;  Laterality: N/A;    KIDNEY TRANSPLANT      RETINAL LASER PROCEDURE       Family History   Problem Relation Age of Onset    Diabetes Mother     Hypertension Mother     Heart failure Mother     Kidney disease Sister         ESRD    Diabetes Sister     Diabetes Maternal Grandmother     Cancer Neg Hx      Social History     Socioeconomic History    Marital status:     Number of children: 2   Occupational History    Occupation: retired     Employer: Retired   Tobacco Use    Smoking status: Former     Types: Cigarettes     Quit date: 5/25/2013     Years since quitting: 10.1     Passive exposure: Past    Smokeless tobacco: Former     Quit date: 6/11/2013    Tobacco comments:     used marijuana since 5760-3176, stopped after started dialysis   Substance and Sexual Activity    Alcohol use: No     Alcohol/week: 0.0 standard drinks    Drug use: No     Comment:      Sexual activity: Never   Social History Narrative    . Lives with spouse. Has 2 children. Patient retired as  for [employer]. He has been washing cars.     Medication List with Changes/Refills   Current Medications    AMITRIPTYLINE (ELAVIL) 25 MG TABLET    Take 1 tablet (25 mg total) by mouth nightly as needed for Insomnia.    APIXABAN (ELIQUIS) 5 MG TAB    Take 1 tablet (5 mg total) by mouth 2 (two) times daily.    ASPIRIN (ECOTRIN) 81 MG  "EC TABLET    Take 1 tablet by mouth Daily.     BD ULTRA-FINE MINI PEN NEEDLE 31 GAUGE X 3/16" NDLE    Use to inject insulin as needed up to 4 times daily    BLOOD SUGAR DIAGNOSTIC STRP    Use to test four times per day    BLOOD SUGAR DIAGNOSTIC STRP    100 each by Misc.(Non-Drug; Combo Route) route 4 (four) times daily before meals and nightly.    BLOOD-GLUCOSE METER (FREESTYLE LITE METER) KIT    1 each 4 (four) times daily.    CALCITRIOL (ROCALTROL) 0.25 MCG CAP    Take 1 capsule (0.25 mcg total) by mouth once daily.    DICLOFENAC SODIUM (VOLTAREN) 1 % GEL    Apply 2 g topically 3 (three) times daily.    FAMOTIDINE (PEPCID) 20 MG TABLET    TAKE ONE TABLET BY MOUTH EVERY EVENING    FISH OIL-OMEGA-3 FATTY ACIDS 300-1,000 MG CAPSULE    Take 1 g by mouth once daily.    GLIMEPIRIDE (AMARYL) 2 MG TABLET    Take 1 tablet (2 mg total) by mouth before breakfast.    INSULIN (LANTUS SOLOSTAR U-100 INSULIN) GLARGINE 100 UNITS/ML SUBQ PEN    Inject 35 Units into the skin 2 (two) times daily.    INSULIN ASPART U-100 (NOVOLOG FLEXPEN U-100 INSULIN) 100 UNIT/ML (3 ML) INPN PEN    Inject 25 Units into the skin 3 (three) times daily with meals.    KETOCONAZOLE (NIZORAL) 200 MG TAB    Take HALF A tablet (100 mg total) by mouth once daily.    LANCETS (MICROLET LANCET) MISC    100 lancets by Misc.(Non-Drug; Combo Route) route 4 (four) times daily before meals and nightly.    MAGNESIUM OXIDE (MAG-OX) 400 MG (241.3 MG MAGNESIUM) TABLET    Take 1 tablet (400 mg total) by mouth once daily.    METOPROLOL SUCCINATE (TOPROL-XL) 25 MG 24 HR TABLET    Take 1 tablet (25 mg total) by mouth once daily.    MONTELUKAST (SINGULAIR) 10 MG TABLET    Take 1 tablet (10 mg total) by mouth every evening.    MULTIVITAMIN (THERAGRAN) TABLET    Take 1 tablet by mouth once daily.    MYCOPHENOLATE (CELLCEPT) 250 MG CAP    Take 2 capsules (500 mg total) by mouth 2 (two) times daily.    ONDANSETRON (ZOFRAN) 4 MG TABLET    Take 1 tablet (4 mg total) by mouth every " "6 (six) hours.    PEN NEEDLE, DIABETIC (BD INSULIN PEN NEEDLE UF SHORT) 31 GAUGE X 5/16" NDLE    USE TO INJECT INSULIN TWICE A DAY    POTASSIUM CHLORIDE SA (K-DUR,KLOR-CON) 20 MEQ TABLET    Take 2 tablets (40 mEq total) by mouth once daily.    PREDNISONE (DELTASONE) 5 MG TABLET    Take 1 tablet (5 mg total) by mouth once daily.    PROMETHAZINE-DEXTROMETHORPHAN (PROMETHAZINE-DM) 6.25-15 MG/5 ML SYRP    Take 5 mLs by mouth every 6 (six) hours as needed (cough).    ROSUVASTATIN (CRESTOR) 40 MG TAB    Take 1 tablet (40 mg total) by mouth once daily in the evening.    SACUBITRIL-VALSARTAN (ENTRESTO)  MG PER TABLET    Take 1 tablet by mouth 2 (two) times daily.    TACROLIMUS (PROGRAF) 1 MG CAP    Take 4 capsules (4 mg total) by mouth every 12 (twelve) hours.    TAMSULOSIN (FLOMAX) 0.4 MG CAP    Take 1 capsule (0.4 mg total) by mouth once daily.    TRIAMCINOLONE ACETONIDE 0.1% (KENALOG) 0.1 % OINTMENT    Apply topically 2 (two) times daily. Use on bilateral lower legs.    VALGANCICLOVIR (VALCYTE) 450 MG TAB    Take 1 tablet (450 mg total) by mouth every other day.     Review of patient's allergies indicates:   Allergen Reactions    Lisinopril Other (See Comments)     Other reaction(s):  cough    Actos  [pioglitazone] Other (See Comments)     Other reaction(s): CHF    Metformin Other (See Comments)     Other reaction(s): renal insuff  Other reaction(s): CHF     Review of Systems   Constitutional: Negative for malaise/fatigue.   HENT:  Negative for hearing loss.    Eyes:  Positive for visual disturbance. Negative for double vision.   Cardiovascular:  Positive for chest pain.   Respiratory:  Positive for sleep disturbances due to breathing. Negative for shortness of breath.    Endocrine: Negative for cold intolerance.   Hematologic/Lymphatic: Does not bruise/bleed easily.   Skin:  Negative for poor wound healing and suspicious lesions.   Musculoskeletal:  Positive for falls and neck pain. Negative for gout, joint pain " and joint swelling.   Gastrointestinal:  Positive for diarrhea. Negative for nausea and vomiting.   Genitourinary:  Positive for frequency, hesitancy, incomplete emptying and nocturia. Negative for dysuria.   Neurological:  Positive for dizziness, numbness, paresthesias and sensory change.   Psychiatric/Behavioral:  Negative for depression, memory loss and substance abuse. The patient is not nervous/anxious.    Allergic/Immunologic: Negative for persistent infections.       Objective:   Body mass index is 43.7 kg/m².  There were no vitals filed for this visit.             General    Constitutional: He is oriented to person, place, and time. He appears well-developed and well-nourished. No distress.   HENT:   Head: Normocephalic.   Mouth/Throat: Oropharynx is clear and moist.   Eyes: EOM are normal.   Cardiovascular:  Normal rate.            Pulmonary/Chest: Effort normal.   Abdominal: Soft.   Neurological: He is alert and oriented to person, place, and time. No cranial nerve deficit.   Psychiatric: He has a normal mood and affect.             Right Hand/Wrist Exam     Inspection   Scars: Wrist - absent Hand -  absent  Effusion: Wrist - present Hand -  present    Pain   Wrist - The patient exhibits pain of the scapholunate/lunate ECU and flexor/pronator group.    Tests   Phalens sign: positive  Tinel's sign (median nerve): positive  Carpal Tunnel Compression Test: positive    Atrophy   Thenar:  negative  Intrinsic:  negative    Other     Neuorologic Exam    Median Distribution: abnormal  Ulnar Distribution: normal  Radial Distribution: normal    Comments:  The right hand still has some residual swelling in the dorsal right hand.  He has a positive Tinel and positive Phalen sign.  There is no thenar atrophy noted.          Vascular Exam       Capillary Refill  Right Hand: normal capillary refill          Relevant imaging results reviewed and interpreted by me, discussed with the patient and / or family today  radiographs right wrist were normal other than vascular clips from previous vascular access  Assessment:     Encounter Diagnosis   Name Primary?    Traumatic hematoma of right hand, initial encounter Yes    Right carpal tunnel syndrome    Plan:   The patient was counseled regarding right carpal tunnel release.  Risk complications and alternatives were discussed including the risk of infection, anesthetic risk, injury to nerves and vessels, loss of motion, and possible need for additional surgeries were discussed.  He seems to understand and agree that surgery.  All questions were answered.  He was given Neurontin 100 mg p.o. t.i.d. prior to surgery              Disclaimer: This note was prepared using a voice recognition system and is likely to have sound alike errors within the text.

## 2023-12-01 NOTE — DISCHARGE SUMMARY
[facility] - Surgery (Hospital)  Discharge Note  Short Stay    Procedure(s) (LRB):  RELEASE, CARPAL TUNNEL (Right)      OUTCOME: Patient tolerated treatment/procedure well without complication and is now ready for discharge.    DISPOSITION: Home or Self Care    FINAL DIAGNOSIS:  Right carpal tunnel syndrome    FOLLOWUP: In clinic    DISCHARGE INSTRUCTIONS:    Discharge Procedure Orders   Diet general     Call MD for:  temperature >100.4     Call MD for:  persistent nausea and vomiting     Call MD for:  severe uncontrolled pain     Call MD for:  difficulty breathing, headache or visual disturbances     Call MD for:  redness, tenderness, or signs of infection (pain, swelling, redness, odor or green/yellow discharge around incision site)     Call MD for:  hives     Call MD for:  persistent dizziness or light-headedness     Call MD for:  extreme fatigue        TIME SPENT ON DISCHARGE:  Twenty minutes

## 2023-12-01 NOTE — ANESTHESIA PREPROCEDURE EVALUATION
12/01/2023  Mitch Whittaker is a 68 y.o., male.    Patient Active Problem List   Diagnosis    Anemia associated with chronic renal failure    Obesity    Acquired renal cyst of left kidney    Obstructive sleep apnea    Pseudophakia    CAD (coronary artery disease)    Living-related donor kidney transplant (daughter) - 6/12/15    Diabetic sensorimotor polyneuropathy    Chronic immunosuppression with Prograf, MMF and prednisone    Secondary hyperparathyroidism of renal origin    Type II diabetes mellitus with renal manifestations    Hypertension associated with stage 3 chronic kidney disease due to type 2 diabetes mellitus    DM (diabetes mellitus), type 2 with complications    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, with long-term current use of insulin    Epiretinal membrane (ERM) of both eyes    History of colon polyps    Benign prostatic hyperplasia without lower urinary tract symptoms    PCO (posterior capsular opacification), left    Chronic combined systolic and diastolic congestive heart failure    Deep vein thrombosis (DVT) of lower extremity    Chronic venous insufficiency of lower extremity    Morbid obesity with BMI of 40.0-44.9, adult    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    LBBB (left bundle branch block)    Lambda light chain disease    Cellulitis of extremity    Head injury    Dizziness    Chronic anticoagulation    Fall    Hypotension due to hypovolemia    Severe obesity (BMI >= 40)    Orthostatic hypotension    Kidney transplanted    Syncope and collapse    Dehydration    Chronic cough    Cervical radiculopathy    Bilateral carpal tunnel syndrome    Preop cardiovascular exam    Chronic diarrhea      Pre-op Assessment    I have reviewed the Patient Summary Reports.      I have reviewed the Nursing Notes. I have reviewed the NPO Status.   I have reviewed the Medications.     Review of Systems  Anesthesia Hx:             Denies Family Hx of Anesthesia complications.    Denies Personal Hx of Anesthesia complications.                    Hematology/Oncology:    Oncology Normal    -- Anemia:                                  EENT/Dental:  EENT/Dental Normal           Cardiovascular:     Hypertension   CAD (Nonobstructive CAD on [facility])    Dysrhythmias (LBBB, Frequent PVC's)   CHF       ECG has been reviewed. DVT  Last dose ELIQUIS two days ago    Cleared by cardiologist with normal NMST in 2023                         Pulmonary:        Sleep Apnea                Renal/:  Chronic Renal Disease, CKD  BPH Renal transplant recipient  Cr 2.5, stable             Hepatic/GI:       Hepatitis (CMV)           Musculoskeletal:         Spine Disorders: cervical Degenerative disease           Neurological:        Peripheral Neuropathy                          Endocrine:  Diabetes    Diabetes                 Parathyroid Disease, Hyperparathyroidism          Morbid Obesity / BMI > 40  Dermatological:  Skin Normal    Psych:  Psychiatric Normal                    Physical Exam  General: Alert and Oriented    Airway:  Mallampati: II   Mouth Opening: Normal  TM Distance: Normal  Tongue: Normal  Neck ROM: Normal ROM    2023 TTE    Left Ventricle: The left ventricle is normal in size. Moderately increased ventricular mass. Moderately increased wall thickness. Normal wall motion. There is low normal systolic function with a visually estimated ejection fraction of 50 - 55%. There is normal diastolic function.    Left Atrium: Left atrium is severely dilated.    Right Ventricle: Normal right ventricular cavity size. Wall thickness is normal. Right ventricle wall motion  is normal. Systolic function is normal.    Right Atrium: Right atrium is dilated.    IVC/SVC: Normal venous pressure at 3 mmHg.        Anesthesia Plan  Type of Anesthesia, risks & benefits discussed:    Anesthesia Type: Gen ETT, Gen Supraglottic Airway, MAC  Intra-op Monitoring Plan: Standard ASA Monitors  Informed Consent: Informed consent signed with the Patient and all parties understand the risks and agree with anesthesia plan.  All questions answered.   ASA Score: 3  Day of Surgery Review of History & Physical: I have interviewed and examined the patient. I have reviewed the patient's H&P dated:     Ready For Surgery From Anesthesia Perspective.     .

## 2023-12-01 NOTE — TRANSFER OF CARE
"Anesthesia Transfer of Care Note    Patient: Mitch Whittaker    Procedure(s) Performed: Procedure(s) (LRB):  RELEASE, CARPAL TUNNEL (Right)    Patient location: PACU    Anesthesia Type: MAC    Transport from OR: Transported from OR on room air with adequate spontaneous ventilation    Post pain: adequate analgesia    Post assessment: no apparent anesthetic complications and tolerated procedure well    Post vital signs: stable    Level of consciousness: sedated    Nausea/Vomiting: no nausea/vomiting    Complications: none    Transfer of care protocol was followed      Last vitals: Visit Vitals  BP (!) 145/60   Pulse 67   Temp 36.8 °C (98.2 °F) (Temporal)   Resp 18   Ht 5' 7" (1.702 m)   Wt 127.3 kg (280 lb 12.1 oz)   SpO2 100%   BMI 43.97 kg/m²     "

## 2023-12-01 NOTE — PROGRESS NOTES
SUBJECTIVE:      Patient ID: Mitch Whittaker is a 68 y.o. male.    HPI: Mr. Whittaker is here today for post-operative visit #1.  He is 4 days status post RELEASE, CARPAL TUNNEL (Right) by Dr. Almonte on 12/1/23. He reports that he is doing fairly well.  Pain is 6/10. He is taking the Norco as directed for pain.  He has been compliant with postop instructions and keeping the extremity dry. He denies fever, chills, and sweats since the time of the surgery.     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 RNA NEGATIVE 5/2015, 2021, 2022    History of colon polyps     HPTH (hyperparathyroidism)     Hyperlipidemia     Hypertension associated with stage 3 chronic kidney disease due to type 2 diabetes mellitus     LBBB (left bundle branch block) 12/20/2021    Morbid obesity with BMI of 45.0-49.9, adult     Nephrolithiasis 6/7/2013    PCO (posterior capsular opacification), left 03/04/2019    Proteinuria     resolved s/p transplant    S/P kidney transplant     Sleep apnea     Type 2 diabetes, uncontrolled, with retinopathy     Type II diabetes mellitus with renal manifestations      Past Surgical History:   Procedure Laterality Date    CARDIAC CATHETERIZATION  2008    normal coronary    CARPAL  TUNNEL RELEASE Right 12/1/2023    Procedure: RELEASE, CARPAL TUNNEL;  Surgeon: Noel Almonte MD;  Location: [facility] OR;  Service: Orthopedics;  Laterality: Right;    COLONOSCOPY N/A 4/5/2018    Procedure: COLONOSCOPY;  Surgeon: Chava Ronquillo MD;  Location: [facility] ENDO;  Service: Endoscopy;  Laterality: N/A;    COLONOSCOPY N/A 5/2/2022    Procedure: COLONOSCOPY;  Surgeon: Alix Puente MD;  Location: [facility] ENDO;  Service: Endoscopy;  Laterality: N/A;    COLONOSCOPY N/A 6/7/2023    Procedure: COLONOSCOPY - rule out CMV  Cardiac clearance/Eliquis hold approval received on 05/21/23 per Dr. Meade, cardiology.  Note in encounters.  LB;  Surgeon: Daniella Shah MD;  Location: [facility] ENDO;  Service: Endoscopy;  Laterality: N/A;    KIDNEY TRANSPLANT      RETINAL LASER PROCEDURE       Family History   Problem Relation Age of Onset    Diabetes Mother     Hypertension Mother     Heart failure Mother     Kidney disease Sister         ESRD    Diabetes Sister     Diabetes Maternal Grandmother     Cancer Neg Hx      Social History     Socioeconomic History    Marital status:     Number of children: 2   Occupational History    Occupation: retired     Employer: Retired   Tobacco Use    Smoking status: Former     Current packs/day: 0.00     Types: Cigarettes     Quit date: 5/25/2013     Years since quitting: 10.5     Passive exposure: Past    Smokeless tobacco: Former     Quit date: 6/11/2013    Tobacco comments:     used marijuana since [id number], stopped after started dialysis   Substance and Sexual Activity    Alcohol use: No     Alcohol/week: 0.0 standard drinks of alcohol    Drug use: No     Comment:      Sexual activity: Never   Social History Narrative    . Lives with spouse. Has 2 children. 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     Social Determinants of Health     Financial Resource Strain: Low Risk  (10/2/2020)    Overall Financial Resource Strain  "(CARDIA)     Difficulty of Paying Living Expenses: Not hard at all   Transportation Needs: No Transportation Needs (10/2/2020)    PRAPARE - Transportation     Lack of Transportation (Medical): No     Lack of Transportation (Non-Medical): No   Stress: No Stress Concern Present (10/2/2020)    [employer] - Occupational Stress Questionnaire     Feeling of Stress : Not at all     Medication List with Changes/Refills   Current Medications    AMITRIPTYLINE (ELAVIL) 25 MG TABLET    Take 1 tablet (25 mg total) by mouth nightly as needed for Insomnia.    APIXABAN (ELIQUIS) 5 MG TAB    Take 1 tablet (5 mg total) by mouth 2 (two) times daily.    ASPIRIN (ECOTRIN) 81 MG EC TABLET    Take 1 tablet by mouth Daily.     BD ULTRA-FINE MINI PEN NEEDLE 31 GAUGE X 3/16" NDLE    Use to inject insulin as needed up to 4 times daily    BLOOD SUGAR DIAGNOSTIC (CONTOUR NEXT TEST STRIPS) STRP    by Misc.(Non-Drug; Combo Route) route 4 (four) times daily before meals and nightly.    BLOOD-GLUCOSE METER (FREESTYLE LITE METER) KIT    1 each 4 (four) times daily.    CALCITRIOL (ROCALTROL) 0.25 MCG CAP    Take 1 capsule (0.25 mcg total) by mouth once daily.    COVID [id number],12UP,,ANDU,,PF, (SPIKEVAX [id number],12Y UP,,PF,) 50 MCG/0.5 ML INJECTION    Inject into the muscle.    FAMOTIDINE (PEPCID) 20 MG TABLET    TAKE ONE TABLET BY MOUTH EVERY EVENING    FISH OIL-OMEGA-3 FATTY ACIDS 300-1,000 MG CAPSULE    Take 1 g by mouth once daily.    FUROSEMIDE (LASIX) 80 MG TABLET    Take one-half tablet (40 mg) by mouth 2 (two) times daily.    GABAPENTIN (NEURONTIN) 100 MG CAPSULE    Take 1 capsule (100 mg total) by mouth 3 (three) times daily.    GLIMEPIRIDE (AMARYL) 2 MG TABLET    Take 1 tablet (2 mg total) by mouth before breakfast.    HYDROCODONE-ACETAMINOPHEN (NORCO) 5-325 MG PER TABLET    Take 1 tablet by mouth every 6 (six) hours as needed for Pain.    INSULIN (LANTUS SOLOSTAR U-100 INSULIN) GLARGINE 100 UNITS/ML SUBQ PEN   "  Inject 35 Units into the skin 2 (two) times daily.    INSULIN ASPART U-100 (NOVOLOG FLEXPEN U-100 INSULIN) 100 UNIT/ML (3 ML) INPN PEN    Inject 25 Units into the skin 3 (three) times daily with meals.    KETOCONAZOLE (NIZORAL) 200 MG TAB    Take HALF A tablet (100 mg total) by mouth once daily.    LANCETS (MICROLET LANCET) MISC    100 lancets by Misc.(Non-Drug; Combo Route) route 4 (four) times daily before meals and nightly.    MAGNESIUM OXIDE (MAG-OX) 400 MG (241.3 MG MAGNESIUM) TABLET    Take 1 tablet (400 mg total) by mouth once daily.    METOPROLOL SUCCINATE (TOPROL-XL) 25 MG 24 HR TABLET    Take 1 tablet (25 mg total) by mouth 2 (two) times daily.    MONTELUKAST (SINGULAIR) 10 MG TABLET    Take 1 tablet (10 mg total) by mouth every evening.    MULTIVITAMIN (THERAGRAN) TABLET    Take 1 tablet by mouth once daily.    MYCOPHENOLATE (CELLCEPT) 250 MG CAP    Take 2 capsules (500 mg total) by mouth 2 (two) times daily.    ONDANSETRON (ZOFRAN) 4 MG TABLET    Take 1 tablet (4 mg total) by mouth every 6 (six) hours.    POTASSIUM CHLORIDE SA (K-DUR,KLOR-CON) 20 MEQ TABLET    Take 2 tablets (40 mEq total) by mouth once daily.    PREDNISONE (DELTASONE) 5 MG TABLET    Take 1 tablet (5 mg total) by mouth once daily.    PROMETHAZINE-DEXTROMETHORPHAN (PROMETHAZINE-DM) 6.25-15 MG/5 ML SYRP    Take 5 mLs by mouth every 6 (six) hours as needed (cough).    ROSUVASTATIN (CRESTOR) 40 MG TAB    Take 1 tablet (40 mg total) by mouth once daily in the evening.    SACUBITRIL-VALSARTAN (ENTRESTO)  MG PER TABLET    Take 1 tablet by mouth 2 (two) times daily.    TACROLIMUS (PROGRAF) 1 MG CAP    Take 5 mg every morning and 4 mg every evening (9 mg per day total).    TACROLIMUS (PROGRAF) 1 MG CAP    Take 5 mg by mouth every moring and take 4 mg by mouth in the evening    TAMSULOSIN (FLOMAX) 0.4 MG CAP    Take 1 capsule (0.4 mg total) by mouth once daily.    TRAMADOL (ULTRAM) 50 MG TABLET    Take 1 tablet (50 mg total) by mouth every  "4 (four) hours as needed for Pain.    TRIAMCINOLONE ACETONIDE 0.1% (KENALOG) 0.1 % OINTMENT    Apply topically 2 (two) times daily. Use on bilateral lower legs.    VALGANCICLOVIR (VALCYTE) 450 MG TAB    Take 1 tablet (450 mg total) by mouth every other day.     Review of patient's allergies indicates:   Allergen Reactions    Lisinopril Other (See Comments)     Other reaction(s):  cough    Actos  [pioglitazone] Other (See Comments)     Other reaction(s): CHF    Metformin Other (See Comments)     Other reaction(s): renal insuff  Other reaction(s): CHF       OBJECTIVE:     Physical exam:    Vitals:    12/05/23 1506   Weight: 127.3 kg (280 lb 10.3 oz)   Height: 5' 7" (1.702 m)   PainSc:   6   PainLoc: Wrist     Vital signs are stable, patient is afebrile.  Patient is well dressed and well groomed, no acute distress.  Alert and oriented to person, place, and time.    Right UE:  Post op dressing taken down.   Incision is clean, dry, and intact.   There is no erythema or exudate. There is no sign of any infection.   Mild ecchymosis locally  Mild-mod swelling to hand and fingers  He is NVI.   Sutures in place.   2+ pulses noted.  Cap refill <2 seconds  Motor intact to hand    ASSESSMENT         Encounter Diagnosis   Name Primary?    S/P carpal tunnel release Yes            4 days status post RELEASE, CARPAL TUNNEL (Right)    PLAN:           Mitch was seen today for post-op evaluation.    Diagnoses and all orders for this visit:    S/P carpal tunnel release      - PO instruction reviewed and provided to patient. Encouraged ice, elevation, gentle hand pumps for edema control  - The incision was cleaned with hydrogen peroxide and normal saline. A sterile Band-Aid was applied.   - Patient may clean the incision daily as above.   - he has a brace at home. Patient may use brace as needed for symptomatic relief.    - Patient should notify the office of any signs or symptoms of infection including fevers, erythema, purulent " drainage, increasing pain.    - Follow up for suture removal     POST OPERATIVE INSTRUCTIONS - Visit #1    BANDAGES/DRESSING/BATHING:  We have removed the dressing, cleaned your incision, and put on a band-aid at your first post op visit today. You may clean the incision daily as we did today. Keep the incision clean and dry. When showering, you may cover the incision with a plastic bag/umbrella bag.   Do not use Neosporin or other topical ointments or creams on the incision. If you are concerned about any redness or drainage of the incisions, please call the office.    SWELLING  Some swelling of your arm, hand and fingers is normal. The swelling can be decreased by  elevating your arm on a few pillows when lying down. Swelling can be further controlled by cold therapy over the surgical site. Flexion/extension of the fingers (opening and closing your hands) will also help to relieve swelling and prevent stiffness.    ACTIVITY  You may use the hand and wrist as tolerated for light activity. You are encouraged to bend the wrist, elbow and fingers as soon as the initial surgical dressing is removed. Avoid lifting, pushing, or pulling any object greater than 5-10 pounds for the first 10-14 days.     BRACE  Wear your brace or splint as directed.    MEDICATIONS  Take pain medicines exactly as directed.  If the doctor gave you a prescription medicine for pain, take it as prescribed.  If you are not taking a prescription pain medicine, take an over-the-counter medicine such as acetaminophen (Tylenol), ibuprofen (Advil, Motrin), or naproxen (Aleve) as long as you do not have an allergy or medical condition that prevents you from taking them.  Do not take two or more pain medicines at the same time unless the doctor told you to. Many pain medicines have acetaminophen, which is Tylenol. Too much acetaminophen (Tylenol) can be harmful.    FOLLOW -UP  You should be scheduled for a post-op appointment within the 10-14 days  following surgery, at which time we will review your surgery, remove sutures and evaluate incisions, review your post-operative program and answer any of your questions.          [facility]Y Gax[facility]olaRe[facility]

## 2023-12-01 NOTE — ANESTHESIA POSTPROCEDURE EVALUATION
Anesthesia Post Evaluation    Patient: Mitch Whittaker    Procedure(s) Performed: Procedure(s) (LRB):  RELEASE, CARPAL TUNNEL (Right)    Final Anesthesia Type: MAC      Patient location during evaluation: PACU  Patient participation: Yes- Able to Participate  Level of consciousness: awake  Post-procedure vital signs: reviewed and stable  Pain management: adequate  Airway patency: patent    PONV status at discharge: No PONV  Anesthetic complications: no      Cardiovascular status: hemodynamically stable  Respiratory status: unassisted  Hydration status: euvolemic  Follow-up not needed.              Vitals Value Taken Time   BP 96/46 12/01/23 1038   Temp 36.7 °C (98 °F) 12/01/23 1015   Pulse 83 12/01/23 1040   Resp 117 12/01/23 1040   SpO2 96 % 12/01/23 1039   Vitals shown include unvalidated device data.      Event Time   Out of Recovery 10:42:58         Pain/Lakeisha Score: Lakeisha Score: 10 (12/1/2023 10:30 AM)

## 2023-12-01 NOTE — OP NOTE
[facility] - Surgery ([facility])  Orthopedic Surgery  Operative Note    SUMMARY     Date of Procedure: 12/1/2023   Assistant: None    Procedure: Procedure(s) (LRB):  RELEASE, CARPAL TUNNEL (Right)       Surgeon(s) and Role:     * Noel Almonte MD - Primary    Assisting Surgeon: None    Pre-Operative Diagnosis:  Right carpal tunnel syndrome      Post-Operative Diagnosis:  Right carpal tunnel syndrome    Anesthesia:  Local with sedation    Technical Procedures Used:  right carpal tunnel release    Description of the Findings of the Procedure:  The patient was taken to the operating room where 10 cc of 2% plain lidocaine was used for local anesthetic and was administered.  After exsanguination of the extremity a proximal tourniquet was inflated to 250 mm of mercury.  Satisfactory anesthesia had been achieved the right hand was prepped and draped in the usual sterile fashion.  The patient did receive 2 g of Ancef intravenously preoperatively.  At this time a longitudinal incision was made in line with the 4th ray over the transverse carpal ligament.  The incision was extended using blunt and sharp dissection under 3.5 loupe magnification.  The transverse carpal ligament was identified and sharply incised under direct vision.  A complete release was performed and verified by the surgeon's small finger both proximally and distally.  No additional pathology was encountered.  Satisfactory release had been achieved skin was closed using horizontal mattress 4 0 nylon suture.  Xeroform gauze 4x4s and 2 ABD pads were over wrapped with 3 in gauze dressing.  The patient tolerated the procedure well and was transferred to the recovery room in satisfactory condition.      Complications: No    Estimated Blood Loss (EBL): 5cc                        Condition: Good    Disposition: PACU - hemodynamically stable.    Attestation: I was present and scrubbed for the entire procedure.

## 2023-12-04 VITALS
HEIGHT: 67 IN | HEART RATE: 83 BPM | TEMPERATURE: 98 F | WEIGHT: 280.75 LBS | DIASTOLIC BLOOD PRESSURE: 50 MMHG | BODY MASS INDEX: 44.07 KG/M2 | RESPIRATION RATE: 20 BRPM | SYSTOLIC BLOOD PRESSURE: 100 MMHG | OXYGEN SATURATION: 96 %

## 2023-12-05 ENCOUNTER — OFFICE VISIT (OUTPATIENT)
Dept: ORTHOPEDICS | Facility: CLINIC | Age: 68
End: 2023-12-05
Payer: COMMERCIAL

## 2023-12-05 VITALS — WEIGHT: 280.63 LBS | BODY MASS INDEX: 44.04 KG/M2 | HEIGHT: 67 IN

## 2023-12-05 DIAGNOSIS — Z98.890 S/P CARPAL TUNNEL RELEASE: Primary | ICD-10-CM

## 2023-12-05 PROCEDURE — 3288F FALL RISK ASSESSMENT DOCD: CPT | Mod: CPTII,S$GLB,,

## 2023-12-05 PROCEDURE — 3044F HG A1C LEVEL LT 7.0%: CPT | Mod: CPTII,S$GLB,,

## 2023-12-05 PROCEDURE — 1125F AMNT PAIN NOTED PAIN PRSNT: CPT | Mod: CPTII,S$GLB,,

## 2023-12-05 PROCEDURE — 3288F PR FALLS RISK ASSESSMENT DOCUMENTED: ICD-10-PCS | Mod: CPTII,S$GLB,,

## 2023-12-05 PROCEDURE — 1101F PR PT FALLS ASSESS DOC 0-1 FALLS W/OUT INJ PAST YR: ICD-10-PCS | Mod: CPTII,S$GLB,,

## 2023-12-05 PROCEDURE — 99999 PR PBB SHADOW E&M-EST. PATIENT-LVL IV: ICD-10-PCS | Mod: PBBFAC,,,

## 2023-12-05 PROCEDURE — 1159F PR MEDICATION LIST DOCUMENTED IN MEDICAL RECORD: ICD-10-PCS | Mod: CPTII,S$GLB,,

## 2023-12-05 PROCEDURE — 99024 PR POST-OP FOLLOW-UP VISIT: ICD-10-PCS | Mod: S$GLB,,,

## 2023-12-05 PROCEDURE — 3061F NEG MICROALBUMINURIA REV: CPT | Mod: CPTII,S$GLB,,

## 2023-12-05 PROCEDURE — 99999 PR PBB SHADOW E&M-EST. PATIENT-LVL IV: CPT | Mod: PBBFAC,,,

## 2023-12-05 PROCEDURE — 99024 POSTOP FOLLOW-UP VISIT: CPT | Mod: S$GLB,,,

## 2023-12-05 PROCEDURE — 1125F PR PAIN SEVERITY QUANTIFIED, PAIN PRESENT: ICD-10-PCS | Mod: CPTII,S$GLB,,

## 2023-12-05 PROCEDURE — 3061F PR NEG MICROALBUMINURIA RESULT DOCUMENTED/REVIEW: ICD-10-PCS | Mod: CPTII,S$GLB,,

## 2023-12-05 PROCEDURE — 3044F PR MOST RECENT HEMOGLOBIN A1C LEVEL <7.0%: ICD-10-PCS | Mod: CPTII,S$GLB,,

## 2023-12-05 PROCEDURE — 4010F PR ACE/ARB THEARPY RXD/TAKEN: ICD-10-PCS | Mod: CPTII,S$GLB,,

## 2023-12-05 PROCEDURE — 3066F PR DOCUMENTATION OF TREATMENT FOR NEPHROPATHY: ICD-10-PCS | Mod: CPTII,S$GLB,,

## 2023-12-05 PROCEDURE — 3066F NEPHROPATHY DOC TX: CPT | Mod: CPTII,S$GLB,,

## 2023-12-05 PROCEDURE — 4010F ACE/ARB THERAPY RXD/TAKEN: CPT | Mod: CPTII,S$GLB,,

## 2023-12-05 PROCEDURE — 1101F PT FALLS ASSESS-DOCD LE1/YR: CPT | Mod: CPTII,S$GLB,,

## 2023-12-05 PROCEDURE — 1159F MED LIST DOCD IN RCRD: CPT | Mod: CPTII,S$GLB,,

## 2023-12-07 NOTE — PROGRESS NOTES
HEMATOLOGY / ONCOLOGY   CLINIC NOTE     ONCOLOGICAL HISTORY:     Diagnosis:  - Anemia     Current Treatment:   -     Subjective:       Chief Complaint: Anemia      HPI    Mitch Whittaker  68 y.o.  male with past medical history significant for CHF, DVT, MARION, DM2, DVT, CKD with a history of kidney transplant on 6/12/15.  He has baseline creatinine is between 2.2-3.5.  He takes mycophenolate mofetil, prednisone and tacrolimus for maintenance immunosuppression.      Patient referred here for evaluation of anemia.  Patient has baseline fatigue.  Denies any chest pain, shortness of breath, palpitations, bleeding, bruising.  Denies any fevers, night sweats or weight loss.  Denies history of blood transfusion or ever being treated with IV iron.  He has history of hepatitis-C and has been treated past.  His dose of CellCept has recently been increased    Interval History:       Oncology History    No history exists.       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 RNA NEGATIVE 5/2015, 2021, 2022    History of colon polyps     HPTH (hyperparathyroidism)     Hyperlipidemia     Hypertension associated with stage 3 chronic kidney disease due to type 2 diabetes mellitus     LBBB (left bundle  branch block) 12/20/2021    Morbid obesity with BMI of 45.0-49.9, adult     Nephrolithiasis 6/7/2013    PCO (posterior capsular opacification), left 03/04/2019    Proteinuria     resolved s/p transplant    S/P kidney transplant     Sleep apnea     Type 2 diabetes, uncontrolled, with retinopathy     Type II diabetes mellitus with renal manifestations       Past Surgical History:   Procedure Laterality Date    CARDIAC CATHETERIZATION  2008    normal coronary    CARPAL TUNNEL RELEASE Right 12/1/2023    Procedure: RELEASE, CARPAL TUNNEL;  Surgeon: Noel Almonte MD;  Location: [facility] OR;  Service: Orthopedics;  Laterality: Right;    COLONOSCOPY N/A 4/5/2018    Procedure: COLONOSCOPY;  Surgeon: Chava Ronquillo MD;  Location: [facility] ENDO;  Service: Endoscopy;  Laterality: N/A;    COLONOSCOPY N/A 5/2/2022    Procedure: COLONOSCOPY;  Surgeon: Alix Puente MD;  Location: [facility] ENDO;  Service: Endoscopy;  Laterality: N/A;    COLONOSCOPY N/A 6/7/2023    Procedure: COLONOSCOPY - rule out CMV  Cardiac clearance/Eliquis hold approval received on 05/21/23 per Dr. Meade, cardiology.  Note in encounters.  LB;  Surgeon: Daniella Shah MD;  Location: [facility] ENDO;  Service: Endoscopy;  Laterality: N/A;    KIDNEY TRANSPLANT      RETINAL LASER PROCEDURE       Social History     Socioeconomic History    Marital status:     Number of children: 2   Occupational History    Occupation: retired     Employer: Retired   Tobacco Use    Smoking status: Former     Current packs/day: 0.00     Types: Cigarettes     Quit date: 5/25/2013     Years since quitting: 10.5     Passive exposure: Past    Smokeless tobacco: Former     Quit date: 6/11/2013    Tobacco comments:     used marijuana since [id number], stopped after started dialysis   Substance and Sexual Activity    Alcohol use: No     Alcohol/week: 0.0 standard drinks of alcohol    Drug use: No     Comment:      Sexual activity: Never   Social History Narrative    . Lives  with spouse. Has 2 children. 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     Social Determinants of Health     Financial Resource Strain: Low Risk  (10/2/2020)    Overall Financial Resource Strain (CARDIA)     Difficulty of Paying Living Expenses: Not hard at all   Transportation Needs: No Transportation Needs (10/2/2020)    PRAPARE - Transportation     Lack of Transportation (Medical): No     Lack of Transportation (Non-Medical): No   Stress: No Stress Concern Present (10/2/2020)    [employer] - Occupational Stress Questionnaire     Feeling of Stress : Not at all      Family History   Problem Relation Age of Onset    Diabetes Mother     Hypertension Mother     Heart failure Mother     Kidney disease Sister         ESRD    Diabetes Sister     Diabetes Maternal Grandmother     Cancer Neg Hx           Review of patient's allergies indicates:   Allergen Reactions    Lisinopril Other (See Comments)     Other reaction(s):  cough    Actos  [pioglitazone] Other (See Comments)     Other reaction(s): CHF    Metformin Other (See Comments)     Other reaction(s): renal insuff  Other reaction(s): CHF      Review of Systems   Constitutional:  Positive for fatigue. Negative for activity change, chills and fever.   HENT: Negative.     Eyes: Negative.    Respiratory:  Negative for cough and shortness of breath.    Cardiovascular:  Positive for leg swelling. Negative for chest pain.   Gastrointestinal:  Negative for constipation, diarrhea, nausea and vomiting.   Endocrine: Negative.    Genitourinary: Negative.    Musculoskeletal:  Negative for arthralgias and myalgias.   Integumentary:  Negative.   Allergic/Immunologic: Negative.    Neurological:  Negative for light-headedness, numbness and headaches.   Hematological: Negative.    Psychiatric/Behavioral: Negative.           Objective:        Vitals:    12/08/23 1304   BP: (!) 143/72   Pulse: 101   Temp: 97.9 °F (36.6  °C)        Physical Exam  Constitutional:       General: He is not in acute distress.     Appearance: He is well-developed. He is not ill-appearing.   HENT:      Head: Normocephalic and atraumatic.      Mouth/Throat:      Mouth: Mucous membranes are moist.   Eyes:      Extraocular Movements: Extraocular movements intact.   Cardiovascular:      Rate and Rhythm: Normal rate and regular rhythm.   Pulmonary:      Effort: Pulmonary effort is normal. No respiratory distress.      Breath sounds: Normal breath sounds. No wheezing.   Abdominal:      General: There is no distension.      Palpations: Abdomen is soft.      Tenderness: There is no abdominal tenderness.   Musculoskeletal:         General: Normal range of motion.      Cervical back: Normal range of motion and neck supple.      Right lower leg: Edema present.      Left lower leg: Edema present.   Skin:     General: Skin is warm.   Neurological:      Mental Status: He is alert and oriented to person, place, and time. Mental status is at baseline.      Cranial Nerves: No cranial nerve deficit.   Psychiatric:         Mood and Affect: Mood normal.           LABS / IMAGING      - Reviewed       Assessment:     ECOG SCORE    1 - Restricted in strenuous activity-ambulatory and able to carry out work of a light nature       ANEMIA   - chronic normocytic anemia since 2006 with mild leukopenia but normal platelets while patient is on immunosuppressive treatment for history of kidney transplant.  Patient with CKD stage 4  - Recent workup with iron saturation of 21, ferritin 450 and normal B12 and folic acid.  Smear with no significant morphologic abnormalities and no evidence of hemolysis.    Plan:     - multifactorial anemia with history of kidney transplant, chronic kidney disease and being on immunosuppressive therapy with chronically elevated ferritin, we will continue to monitor for now and closely monitor the iron saturation and ferritin.   - noted new onset mild  leukopenia, further workup to rule out infectious etiology has been requested.  Patient history of hepatitis-C which had been treated, thus will also check for quantitative viral RNA PCR.  He is on immunosuppressive medications which can also lead to leukopenia and can be adjusted to rule out being the cause for leukopenia.  If still not conclusive then would consider bone marrow biopsy   - MD / LABS VISIT - 6 WEEKS         Med Onc Chart Routing      Follow up with physician 6 weeks.   Follow up with TANYA    Infusion scheduling note    Injection scheduling note    Labs CBC, CMP and LDH   Scheduling:  Preferred lab:  Lab interval:  Labs before next visit   Imaging    Pharmacy appointment    Other referrals                    The patient was seen, interviewed and examined. Pertinent lab and radiology studies were reviewed. Pt instructed to call should develop concerning signs/symptoms or have further questions.       Portions of the record may have been created with voice recognition software. Occasional wrong-word or sound-a-like substitutions may have occurred due to the inherent limitations of voice recognition software. Read the chart carefully and recognize, using context, where substitutions have occurred.    Walker Jaramillo MD  Hematology / Oncology

## 2023-12-08 ENCOUNTER — LAB VISIT (OUTPATIENT)
Dept: LAB | Facility: HOSPITAL | Age: 68
End: 2023-12-08
Attending: INTERNAL MEDICINE
Payer: COMMERCIAL

## 2023-12-08 ENCOUNTER — OFFICE VISIT (OUTPATIENT)
Dept: HEMATOLOGY/ONCOLOGY | Facility: CLINIC | Age: 68
End: 2023-12-08
Payer: COMMERCIAL

## 2023-12-08 VITALS
OXYGEN SATURATION: 99 % | HEART RATE: 101 BPM | DIASTOLIC BLOOD PRESSURE: 72 MMHG | BODY MASS INDEX: 41.09 KG/M2 | SYSTOLIC BLOOD PRESSURE: 143 MMHG | TEMPERATURE: 98 F | WEIGHT: 262.38 LBS

## 2023-12-08 DIAGNOSIS — D64.9 ANEMIA, UNSPECIFIED TYPE: ICD-10-CM

## 2023-12-08 DIAGNOSIS — D64.9 ANEMIA, UNSPECIFIED TYPE: Primary | ICD-10-CM

## 2023-12-08 DIAGNOSIS — Z94.0 KIDNEY REPLACED BY TRANSPLANT: ICD-10-CM

## 2023-12-08 LAB
BASOPHILS NFR BLD: 0 % (ref 0–1.9)
DIFFERENTIAL METHOD: ABNORMAL
EOSINOPHIL NFR BLD: 0 % (ref 0–8)
ERYTHROCYTE [DISTWIDTH] IN BLOOD BY AUTOMATED COUNT: 14.5 % (ref 11.5–14.5)
FERRITIN SERPL-MCNC: 821 NG/ML (ref 20–300)
HAV IGM SERPL QL IA: ABNORMAL
HBV CORE AB SERPL QL IA: NORMAL
HBV CORE IGM SERPL QL IA: ABNORMAL
HBV SURFACE AG SERPL QL IA: ABNORMAL
HCT VFR BLD AUTO: 34.1 % (ref 40–54)
HCV AB SERPL QL IA: REACTIVE
HGB BLD-MCNC: 10.2 G/DL (ref 14–18)
HIV 1+2 AB+HIV1 P24 AG SERPL QL IA: NORMAL
IMM GRANULOCYTES # BLD AUTO: ABNORMAL K/UL (ref 0–0.04)
IMM GRANULOCYTES NFR BLD AUTO: ABNORMAL % (ref 0–0.5)
IRON SERPL-MCNC: 16 UG/DL (ref 45–160)
LYMPHOCYTES NFR BLD: 10 % (ref 18–48)
MCH RBC QN AUTO: 25.8 PG (ref 27–31)
MCHC RBC AUTO-ENTMCNC: 29.9 G/DL (ref 32–36)
MCV RBC AUTO: 86 FL (ref 82–98)
MONOCYTES NFR BLD: 12 % (ref 4–15)
NEUTROPHILS NFR BLD: 77 % (ref 38–73)
NEUTS BAND NFR BLD MANUAL: 1 %
NRBC BLD-RTO: 0 /100 WBC
PLATELET # BLD AUTO: 148 K/UL (ref 150–450)
PLATELET BLD QL SMEAR: ABNORMAL
PMV BLD AUTO: 12.4 FL (ref 9.2–12.9)
POIKILOCYTOSIS BLD QL SMEAR: SLIGHT
RBC # BLD AUTO: 3.96 M/UL (ref 4.6–6.2)
RETICS/RBC NFR AUTO: 1.4 % (ref 0.4–2)
SATURATED IRON: 7 % (ref 20–50)
TOTAL IRON BINDING CAPACITY: 218 UG/DL (ref 250–450)
TRANSFERRIN SERPL-MCNC: 147 MG/DL (ref 200–375)
WBC # BLD AUTO: 2.89 K/UL (ref 3.9–12.7)

## 2023-12-08 PROCEDURE — 3288F PR FALLS RISK ASSESSMENT DOCUMENTED: ICD-10-PCS | Mod: CPTII,S$GLB,, | Performed by: INTERNAL MEDICINE

## 2023-12-08 PROCEDURE — 82728 ASSAY OF FERRITIN: CPT | Performed by: INTERNAL MEDICINE

## 2023-12-08 PROCEDURE — 4010F ACE/ARB THERAPY RXD/TAKEN: CPT | Mod: CPTII,S$GLB,, | Performed by: INTERNAL MEDICINE

## 2023-12-08 PROCEDURE — 1101F PT FALLS ASSESS-DOCD LE1/YR: CPT | Mod: CPTII,S$GLB,, | Performed by: INTERNAL MEDICINE

## 2023-12-08 PROCEDURE — 85027 COMPLETE CBC AUTOMATED: CPT | Performed by: INTERNAL MEDICINE

## 2023-12-08 PROCEDURE — 85045 AUTOMATED RETICULOCYTE COUNT: CPT | Performed by: INTERNAL MEDICINE

## 2023-12-08 PROCEDURE — 3008F PR BODY MASS INDEX (BMI) DOCUMENTED: ICD-10-PCS | Mod: CPTII,S$GLB,, | Performed by: INTERNAL MEDICINE

## 2023-12-08 PROCEDURE — 3077F PR MOST RECENT SYSTOLIC BLOOD PRESSURE >= 140 MM HG: ICD-10-PCS | Mod: CPTII,S$GLB,, | Performed by: INTERNAL MEDICINE

## 2023-12-08 PROCEDURE — 36415 COLL VENOUS BLD VENIPUNCTURE: CPT | Performed by: INTERNAL MEDICINE

## 2023-12-08 PROCEDURE — 3044F PR MOST RECENT HEMOGLOBIN A1C LEVEL <7.0%: ICD-10-PCS | Mod: CPTII,S$GLB,, | Performed by: INTERNAL MEDICINE

## 2023-12-08 PROCEDURE — 99999 PR PBB SHADOW E&M-EST. PATIENT-LVL III: CPT | Mod: PBBFAC,,, | Performed by: INTERNAL MEDICINE

## 2023-12-08 PROCEDURE — 84466 ASSAY OF TRANSFERRIN: CPT | Performed by: INTERNAL MEDICINE

## 2023-12-08 PROCEDURE — 3061F PR NEG MICROALBUMINURIA RESULT DOCUMENTED/REVIEW: ICD-10-PCS | Mod: CPTII,S$GLB,, | Performed by: INTERNAL MEDICINE

## 2023-12-08 PROCEDURE — 83540 ASSAY OF IRON: CPT | Performed by: INTERNAL MEDICINE

## 2023-12-08 PROCEDURE — 82668 ASSAY OF ERYTHROPOIETIN: CPT | Performed by: INTERNAL MEDICINE

## 2023-12-08 PROCEDURE — 3066F PR DOCUMENTATION OF TREATMENT FOR NEPHROPATHY: ICD-10-PCS | Mod: CPTII,S$GLB,, | Performed by: INTERNAL MEDICINE

## 2023-12-08 PROCEDURE — 1126F AMNT PAIN NOTED NONE PRSNT: CPT | Mod: CPTII,S$GLB,, | Performed by: INTERNAL MEDICINE

## 2023-12-08 PROCEDURE — 99203 PR OFFICE/OUTPT VISIT, NEW, LEVL III, 30-44 MIN: ICD-10-PCS | Mod: S$GLB,,, | Performed by: INTERNAL MEDICINE

## 2023-12-08 PROCEDURE — 1126F PR PAIN SEVERITY QUANTIFIED, NO PAIN PRESENT: ICD-10-PCS | Mod: CPTII,S$GLB,, | Performed by: INTERNAL MEDICINE

## 2023-12-08 PROCEDURE — 80074 ACUTE HEPATITIS PANEL: CPT | Performed by: INTERNAL MEDICINE

## 2023-12-08 PROCEDURE — 1101F PR PT FALLS ASSESS DOC 0-1 FALLS W/OUT INJ PAST YR: ICD-10-PCS | Mod: CPTII,S$GLB,, | Performed by: INTERNAL MEDICINE

## 2023-12-08 PROCEDURE — 85007 BL SMEAR W/DIFF WBC COUNT: CPT | Performed by: INTERNAL MEDICINE

## 2023-12-08 PROCEDURE — 3008F BODY MASS INDEX DOCD: CPT | Mod: CPTII,S$GLB,, | Performed by: INTERNAL MEDICINE

## 2023-12-08 PROCEDURE — 3044F HG A1C LEVEL LT 7.0%: CPT | Mod: CPTII,S$GLB,, | Performed by: INTERNAL MEDICINE

## 2023-12-08 PROCEDURE — 99203 OFFICE O/P NEW LOW 30 MIN: CPT | Mod: S$GLB,,, | Performed by: INTERNAL MEDICINE

## 2023-12-08 PROCEDURE — 3077F SYST BP >= 140 MM HG: CPT | Mod: CPTII,S$GLB,, | Performed by: INTERNAL MEDICINE

## 2023-12-08 PROCEDURE — 86704 HEP B CORE ANTIBODY TOTAL: CPT | Performed by: INTERNAL MEDICINE

## 2023-12-08 PROCEDURE — 3078F DIAST BP <80 MM HG: CPT | Mod: CPTII,S$GLB,, | Performed by: INTERNAL MEDICINE

## 2023-12-08 PROCEDURE — 87522 HEPATITIS C REVRS TRNSCRPJ: CPT | Performed by: INTERNAL MEDICINE

## 2023-12-08 PROCEDURE — 3078F PR MOST RECENT DIASTOLIC BLOOD PRESSURE < 80 MM HG: ICD-10-PCS | Mod: CPTII,S$GLB,, | Performed by: INTERNAL MEDICINE

## 2023-12-08 PROCEDURE — 3066F NEPHROPATHY DOC TX: CPT | Mod: CPTII,S$GLB,, | Performed by: INTERNAL MEDICINE

## 2023-12-08 PROCEDURE — 87389 HIV-1 AG W/HIV-1&-2 AB AG IA: CPT | Performed by: INTERNAL MEDICINE

## 2023-12-08 PROCEDURE — 3288F FALL RISK ASSESSMENT DOCD: CPT | Mod: CPTII,S$GLB,, | Performed by: INTERNAL MEDICINE

## 2023-12-08 PROCEDURE — 3061F NEG MICROALBUMINURIA REV: CPT | Mod: CPTII,S$GLB,, | Performed by: INTERNAL MEDICINE

## 2023-12-08 PROCEDURE — 4010F PR ACE/ARB THEARPY RXD/TAKEN: ICD-10-PCS | Mod: CPTII,S$GLB,, | Performed by: INTERNAL MEDICINE

## 2023-12-08 PROCEDURE — 99999 PR PBB SHADOW E&M-EST. PATIENT-LVL III: ICD-10-PCS | Mod: PBBFAC,,, | Performed by: INTERNAL MEDICINE

## 2023-12-11 LAB
EPO SERPL-ACNC: 14.6 MIU/ML (ref 2.6–18.5)
HCV RNA SERPL QL NAA+PROBE: NOT DETECTED
HCV RNA SPEC NAA+PROBE-ACNC: NOT DETECTED IU/ML

## 2023-12-12 ENCOUNTER — OFFICE VISIT (OUTPATIENT)
Dept: ORTHOPEDICS | Facility: CLINIC | Age: 68
End: 2023-12-12
Payer: COMMERCIAL

## 2023-12-12 VITALS — BODY MASS INDEX: 41.12 KG/M2 | HEIGHT: 67 IN | WEIGHT: 262 LBS

## 2023-12-12 DIAGNOSIS — Z98.890 S/P CARPAL TUNNEL RELEASE: ICD-10-CM

## 2023-12-12 DIAGNOSIS — G56.03 BILATERAL CARPAL TUNNEL SYNDROME: Primary | ICD-10-CM

## 2023-12-12 DIAGNOSIS — M25.521 RIGHT ELBOW PAIN: Primary | ICD-10-CM

## 2023-12-12 PROCEDURE — 3008F BODY MASS INDEX DOCD: CPT | Mod: CPTII,S$GLB,, | Performed by: ORTHOPAEDIC SURGERY

## 2023-12-12 PROCEDURE — 1125F AMNT PAIN NOTED PAIN PRSNT: CPT | Mod: CPTII,S$GLB,, | Performed by: ORTHOPAEDIC SURGERY

## 2023-12-12 PROCEDURE — 1160F PR REVIEW ALL MEDS BY PRESCRIBER/CLIN PHARMACIST DOCUMENTED: ICD-10-PCS | Mod: CPTII,S$GLB,, | Performed by: ORTHOPAEDIC SURGERY

## 2023-12-12 PROCEDURE — 99999 PR PBB SHADOW E&M-EST. PATIENT-LVL V: CPT | Mod: PBBFAC,,, | Performed by: ORTHOPAEDIC SURGERY

## 2023-12-12 PROCEDURE — 3008F PR BODY MASS INDEX (BMI) DOCUMENTED: ICD-10-PCS | Mod: CPTII,S$GLB,, | Performed by: ORTHOPAEDIC SURGERY

## 2023-12-12 PROCEDURE — 3044F HG A1C LEVEL LT 7.0%: CPT | Mod: CPTII,S$GLB,, | Performed by: ORTHOPAEDIC SURGERY

## 2023-12-12 PROCEDURE — 99213 OFFICE O/P EST LOW 20 MIN: CPT | Mod: 24,S$GLB,, | Performed by: ORTHOPAEDIC SURGERY

## 2023-12-12 PROCEDURE — 3066F PR DOCUMENTATION OF TREATMENT FOR NEPHROPATHY: ICD-10-PCS | Mod: CPTII,S$GLB,, | Performed by: ORTHOPAEDIC SURGERY

## 2023-12-12 PROCEDURE — 4010F PR ACE/ARB THEARPY RXD/TAKEN: ICD-10-PCS | Mod: CPTII,S$GLB,, | Performed by: ORTHOPAEDIC SURGERY

## 2023-12-12 PROCEDURE — 3066F NEPHROPATHY DOC TX: CPT | Mod: CPTII,S$GLB,, | Performed by: ORTHOPAEDIC SURGERY

## 2023-12-12 PROCEDURE — 99213 PR OFFICE/OUTPT VISIT, EST, LEVL III, 20-29 MIN: ICD-10-PCS | Mod: 24,S$GLB,, | Performed by: ORTHOPAEDIC SURGERY

## 2023-12-12 PROCEDURE — 3288F PR FALLS RISK ASSESSMENT DOCUMENTED: ICD-10-PCS | Mod: CPTII,S$GLB,, | Performed by: ORTHOPAEDIC SURGERY

## 2023-12-12 PROCEDURE — 99999 PR PBB SHADOW E&M-EST. PATIENT-LVL V: ICD-10-PCS | Mod: PBBFAC,,, | Performed by: ORTHOPAEDIC SURGERY

## 2023-12-12 PROCEDURE — 3061F PR NEG MICROALBUMINURIA RESULT DOCUMENTED/REVIEW: ICD-10-PCS | Mod: CPTII,S$GLB,, | Performed by: ORTHOPAEDIC SURGERY

## 2023-12-12 PROCEDURE — 3044F PR MOST RECENT HEMOGLOBIN A1C LEVEL <7.0%: ICD-10-PCS | Mod: CPTII,S$GLB,, | Performed by: ORTHOPAEDIC SURGERY

## 2023-12-12 PROCEDURE — 1159F MED LIST DOCD IN RCRD: CPT | Mod: CPTII,S$GLB,, | Performed by: ORTHOPAEDIC SURGERY

## 2023-12-12 PROCEDURE — 1101F PT FALLS ASSESS-DOCD LE1/YR: CPT | Mod: CPTII,S$GLB,, | Performed by: ORTHOPAEDIC SURGERY

## 2023-12-12 PROCEDURE — 3061F NEG MICROALBUMINURIA REV: CPT | Mod: CPTII,S$GLB,, | Performed by: ORTHOPAEDIC SURGERY

## 2023-12-12 PROCEDURE — 1160F RVW MEDS BY RX/DR IN RCRD: CPT | Mod: CPTII,S$GLB,, | Performed by: ORTHOPAEDIC SURGERY

## 2023-12-12 PROCEDURE — 1125F PR PAIN SEVERITY QUANTIFIED, PAIN PRESENT: ICD-10-PCS | Mod: CPTII,S$GLB,, | Performed by: ORTHOPAEDIC SURGERY

## 2023-12-12 PROCEDURE — 3288F FALL RISK ASSESSMENT DOCD: CPT | Mod: CPTII,S$GLB,, | Performed by: ORTHOPAEDIC SURGERY

## 2023-12-12 PROCEDURE — 4010F ACE/ARB THERAPY RXD/TAKEN: CPT | Mod: CPTII,S$GLB,, | Performed by: ORTHOPAEDIC SURGERY

## 2023-12-12 PROCEDURE — 1101F PR PT FALLS ASSESS DOC 0-1 FALLS W/OUT INJ PAST YR: ICD-10-PCS | Mod: CPTII,S$GLB,, | Performed by: ORTHOPAEDIC SURGERY

## 2023-12-12 PROCEDURE — 1159F PR MEDICATION LIST DOCUMENTED IN MEDICAL RECORD: ICD-10-PCS | Mod: CPTII,S$GLB,, | Performed by: ORTHOPAEDIC SURGERY

## 2023-12-12 NOTE — PROGRESS NOTES
Subjective:     Patient ID: Mitch Whittaker is a 68 y.o. male.    Chief Complaint: Pain and Post-op Evaluation of the Right Hand      HPI:  The patient is a 68-year-old male status post right carpal tunnel release date of surgery is 12/01/2023.  He reports for suture removal today.  He has developed a right elbow lateral epicondylitis.  He requests injection for his elbow.    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 RNA NEGATIVE 5/2015, 2021, 2022    History of colon polyps     HPTH (hyperparathyroidism)     Hyperlipidemia     Hypertension associated with stage 3 chronic kidney disease due to type 2 diabetes mellitus     LBBB (left bundle branch block) 12/20/2021    Morbid obesity with BMI of 45.0-49.9, adult     Nephrolithiasis 6/7/2013    PCO (posterior capsular opacification), left 03/04/2019    Proteinuria     resolved s/p transplant    S/P kidney transplant     Sleep apnea     Type 2 diabetes, uncontrolled, with retinopathy     Type II diabetes mellitus with renal manifestations      Past Surgical History:   Procedure Laterality Date    CARDIAC CATHETERIZATION  2008    normal coronary    CARPAL TUNNEL RELEASE Right 12/1/2023    Procedure: RELEASE, CARPAL TUNNEL;  Surgeon: Suzy  Noel Huang MD;  Location: [facility] OR;  Service: Orthopedics;  Laterality: Right;    COLONOSCOPY N/A 4/5/2018    Procedure: COLONOSCOPY;  Surgeon: Chava Ronquillo MD;  Location: [facility] ENDO;  Service: Endoscopy;  Laterality: N/A;    COLONOSCOPY N/A 5/2/2022    Procedure: COLONOSCOPY;  Surgeon: Alix Puente MD;  Location: [facility] ENDO;  Service: Endoscopy;  Laterality: N/A;    COLONOSCOPY N/A 6/7/2023    Procedure: COLONOSCOPY - rule out CMV  Cardiac clearance/Eliquis hold approval received on 05/21/23 per Dr. Meade, cardiology.  Note in encounters.  LB;  Surgeon: Daniella Shah MD;  Location: [facility] ENDO;  Service: Endoscopy;  Laterality: N/A;    KIDNEY TRANSPLANT      RETINAL LASER PROCEDURE       Family History   Problem Relation Age of Onset    Diabetes Mother     Hypertension Mother     Heart failure Mother     Kidney disease Sister         ESRD    Diabetes Sister     Diabetes Maternal Grandmother     Cancer Neg Hx      Social History     Socioeconomic History    Marital status:     Number of children: 2   Occupational History    Occupation: retired     Employer: Retired   Tobacco Use    Smoking status: Former     Current packs/day: 0.00     Types: Cigarettes     Quit date: 5/25/2013     Years since quitting: 10.5     Passive exposure: Past    Smokeless tobacco: Former     Quit date: 6/11/2013    Tobacco comments:     used marijuana since [id number], stopped after started dialysis   Substance and Sexual Activity    Alcohol use: No     Alcohol/week: 0.0 standard drinks of alcohol    Drug use: No     Comment:      Sexual activity: Never   Social History Narrative    . Lives with spouse. Has 2 children. 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     Social Determinants of Health     Financial Resource Strain: Low Risk  (10/2/2020)    Overall Financial Resource Strain (CARDIA)     Difficulty of Paying Living Expenses: Not hard at all   Transportation Needs: No  "Transportation Needs (10/2/2020)    PRAPARE - Transportation     Lack of Transportation (Medical): No     Lack of Transportation (Non-Medical): No   Stress: No Stress Concern Present (10/2/2020)    [employer] - Occupational Stress Questionnaire     Feeling of Stress : Not at all     Medication List with Changes/Refills   Current Medications    AMITRIPTYLINE (ELAVIL) 25 MG TABLET    Take 1 tablet (25 mg total) by mouth nightly as needed for Insomnia.    APIXABAN (ELIQUIS) 5 MG TAB    Take 1 tablet (5 mg total) by mouth 2 (two) times daily.    ASPIRIN (ECOTRIN) 81 MG EC TABLET    Take 1 tablet by mouth Daily.     BD ULTRA-FINE MINI PEN NEEDLE 31 GAUGE X 3/16" NDLE    Use to inject insulin as needed up to 4 times daily    BLOOD SUGAR DIAGNOSTIC (CONTOUR NEXT TEST STRIPS) STRP    by Misc.(Non-Drug; Combo Route) route 4 (four) times daily before meals and nightly.    BLOOD-GLUCOSE METER (FREESTYLE LITE METER) KIT    1 each 4 (four) times daily.    CALCITRIOL (ROCALTROL) 0.25 MCG CAP    Take 1 capsule (0.25 mcg total) by mouth once daily.    COVID [id number],12UP,,ANDU,,PF, (SPIKEVAX 4904-0466,12Y UP,,PF,) 50 MCG/0.5 ML INJECTION    Inject into the muscle.    FAMOTIDINE (PEPCID) 20 MG TABLET    TAKE ONE TABLET BY MOUTH EVERY EVENING    FISH OIL-OMEGA-3 FATTY ACIDS 300-1,000 MG CAPSULE    Take 1 g by mouth once daily.    FUROSEMIDE (LASIX) 80 MG TABLET    Take one-half tablet (40 mg) by mouth 2 (two) times daily.    GABAPENTIN (NEURONTIN) 100 MG CAPSULE    Take 1 capsule (100 mg total) by mouth 3 (three) times daily.    GLIMEPIRIDE (AMARYL) 2 MG TABLET    Take 1 tablet (2 mg total) by mouth before breakfast.    HYDROCODONE-ACETAMINOPHEN (NORCO) 5-325 MG PER TABLET    Take 1 tablet by mouth every 6 (six) hours as needed for Pain.    INSULIN (LANTUS SOLOSTAR U-100 INSULIN) GLARGINE 100 UNITS/ML SUBQ PEN    Inject 35 Units into the skin 2 (two) times daily.    INSULIN ASPART U-100 (NOVOLOG FLEXPEN " U-100 INSULIN) 100 UNIT/ML (3 ML) INPN PEN    Inject 25 Units into the skin 3 (three) times daily with meals.    KETOCONAZOLE (NIZORAL) 200 MG TAB    Take HALF A tablet (100 mg total) by mouth once daily.    LANCETS (MICROLET LANCET) MISC    100 lancets by Misc.(Non-Drug; Combo Route) route 4 (four) times daily before meals and nightly.    MAGNESIUM OXIDE (MAG-OX) 400 MG (241.3 MG MAGNESIUM) TABLET    Take 1 tablet (400 mg total) by mouth once daily.    METOPROLOL SUCCINATE (TOPROL-XL) 25 MG 24 HR TABLET    Take 1 tablet (25 mg total) by mouth 2 (two) times daily.    MONTELUKAST (SINGULAIR) 10 MG TABLET    Take 1 tablet (10 mg total) by mouth every evening.    MULTIVITAMIN (THERAGRAN) TABLET    Take 1 tablet by mouth once daily.    MYCOPHENOLATE (CELLCEPT) 250 MG CAP    Take 2 capsules (500 mg total) by mouth 2 (two) times daily.    ONDANSETRON (ZOFRAN) 4 MG TABLET    Take 1 tablet (4 mg total) by mouth every 6 (six) hours.    POTASSIUM CHLORIDE SA (K-DUR,KLOR-CON) 20 MEQ TABLET    Take 2 tablets (40 mEq total) by mouth once daily.    PREDNISONE (DELTASONE) 5 MG TABLET    Take 1 tablet (5 mg total) by mouth once daily.    PROMETHAZINE-DEXTROMETHORPHAN (PROMETHAZINE-DM) 6.25-15 MG/5 ML SYRP    Take 5 mLs by mouth every 6 (six) hours as needed (cough).    ROSUVASTATIN (CRESTOR) 40 MG TAB    Take 1 tablet (40 mg total) by mouth once daily in the evening.    SACUBITRIL-VALSARTAN (ENTRESTO)  MG PER TABLET    Take 1 tablet by mouth 2 (two) times daily.    TACROLIMUS (PROGRAF) 1 MG CAP    Take 5 mg every morning and 4 mg every evening (9 mg per day total).    TACROLIMUS (PROGRAF) 1 MG CAP    Take 5 mg by mouth every moring and take 4 mg by mouth in the evening    TAMSULOSIN (FLOMAX) 0.4 MG CAP    Take 1 capsule (0.4 mg total) by mouth once daily.    TRAMADOL (ULTRAM) 50 MG TABLET    Take 1 tablet (50 mg total) by mouth every 4 (four) hours as needed for Pain.    TRIAMCINOLONE ACETONIDE 0.1% (KENALOG) 0.1 % OINTMENT     Apply topically 2 (two) times daily. Use on bilateral lower legs.    VALGANCICLOVIR (VALCYTE) 450 MG TAB    Take 1 tablet (450 mg total) by mouth every other day.     Review of patient's allergies indicates:   Allergen Reactions    Lisinopril Other (See Comments)     Other reaction(s):  cough    Actos  [pioglitazone] Other (See Comments)     Other reaction(s): CHF    Metformin Other (See Comments)     Other reaction(s): renal insuff  Other reaction(s): CHF     Review of Systems   Constitutional: Negative for malaise/fatigue.   HENT:  Negative for hearing loss.    Eyes:  Positive for visual disturbance. Negative for double vision.   Cardiovascular:  Positive for irregular heartbeat and syncope. Negative for chest pain.   Respiratory:  Positive for sleep disturbances due to breathing. Negative for shortness of breath.    Endocrine: Negative for cold intolerance.   Hematologic/Lymphatic: Does not bruise/bleed easily.   Skin:  Negative for poor wound healing and suspicious lesions.   Musculoskeletal:  Positive for falls and neck pain. Negative for gout, joint pain and joint swelling.   Gastrointestinal:  Positive for diarrhea. Negative for nausea and vomiting.   Genitourinary:  Positive for frequency, hesitancy, incomplete emptying and nocturia. Negative for dysuria.   Neurological:  Positive for dizziness, numbness, paresthesias and sensory change.   Psychiatric/Behavioral:  Negative for depression, memory loss and substance abuse. The patient is not nervous/anxious.    Allergic/Immunologic: Negative for persistent infections.       Objective:   Body mass index is 41.04 kg/m².  There were no vitals filed for this visit.             General    Constitutional: He is oriented to person, place, and time. He appears well-developed and well-nourished. No distress.   HENT:   Head: Normocephalic.   Eyes: EOM are normal.   Pulmonary/Chest: Effort normal.   Neurological: He is oriented to person, place, and time.   Psychiatric:  He has a normal mood and affect.             Right Hand/Wrist Exam     Inspection   Scars: Wrist - present Hand -  present  Effusion: Wrist - absent Hand -  absent    Other     Neuorologic Exam    Median Distribution: normal  Ulnar Distribution: normal  Radial Distribution: normal      Right Elbow Exam     Inspection   Scars: absent  Effusion: absent    Pain   The patient exhibits pain of the lateral epicondyle    Other   Sensation: normal    Comments:  The patient had a suture line intact in his right carpal tunnel incision.  There is no sign of infection.  He has tenderness about the right elbow lateral epicondyle.  There is pain on resisted wrist and finger extension.          Vascular Exam       Capillary Refill  Right Hand: normal capillary refill         radiographs were not obtained today  Assessment:     Encounter Diagnoses   Name Primary?    Bilateral carpal tunnel syndrome Yes    S/P carpal tunnel release     Right elbow lateral epicondylitis    Plan:     The patient injected right elbow lateral epicondyle with 1 cc Kenalog 1 cc 2% plain lidocaine.  Sutures were removed from his carpal tunnel today.  Steri-Strips were applied.  If he still has elbow discomfort in a week or so he will return and he will have radiographs at that time.                Disclaimer: This note was prepared using a voice recognition system and is likely to have sound alike errors within the text.

## 2023-12-13 ENCOUNTER — HOSPITAL ENCOUNTER (OUTPATIENT)
Dept: RADIOLOGY | Facility: HOSPITAL | Age: 68
Discharge: HOME | End: 2023-12-13
Attending: ORTHOPAEDIC SURGERY
Payer: COMMERCIAL

## 2023-12-13 ENCOUNTER — OFFICE VISIT (OUTPATIENT)
Dept: ORTHOPEDICS | Facility: CLINIC | Age: 68
End: 2023-12-13
Payer: COMMERCIAL

## 2023-12-13 VITALS — HEIGHT: 67 IN | BODY MASS INDEX: 41.12 KG/M2 | WEIGHT: 262 LBS

## 2023-12-13 DIAGNOSIS — M25.521 RIGHT ELBOW PAIN: ICD-10-CM

## 2023-12-13 DIAGNOSIS — M25.521 RIGHT ELBOW PAIN: Primary | ICD-10-CM

## 2023-12-13 PROCEDURE — 73080 XR ELBOW COMPLETE 3 VIEW RIGHT: ICD-10-PCS | Mod: 26,RT,, | Performed by: RADIOLOGY

## 2023-12-13 PROCEDURE — 99213 OFFICE O/P EST LOW 20 MIN: CPT | Mod: 24,S$GLB,, | Performed by: ORTHOPAEDIC SURGERY

## 2023-12-13 PROCEDURE — 99213 PR OFFICE/OUTPT VISIT, EST, LEVL III, 20-29 MIN: ICD-10-PCS | Mod: 24,S$GLB,, | Performed by: ORTHOPAEDIC SURGERY

## 2023-12-13 PROCEDURE — 3061F PR NEG MICROALBUMINURIA RESULT DOCUMENTED/REVIEW: ICD-10-PCS | Mod: CPTII,S$GLB,, | Performed by: ORTHOPAEDIC SURGERY

## 2023-12-13 PROCEDURE — 3066F PR DOCUMENTATION OF TREATMENT FOR NEPHROPATHY: ICD-10-PCS | Mod: CPTII,S$GLB,, | Performed by: ORTHOPAEDIC SURGERY

## 2023-12-13 PROCEDURE — 1159F MED LIST DOCD IN RCRD: CPT | Mod: CPTII,S$GLB,, | Performed by: ORTHOPAEDIC SURGERY

## 2023-12-13 PROCEDURE — 1125F PR PAIN SEVERITY QUANTIFIED, PAIN PRESENT: ICD-10-PCS | Mod: CPTII,S$GLB,, | Performed by: ORTHOPAEDIC SURGERY

## 2023-12-13 PROCEDURE — 73080 X-RAY EXAM OF ELBOW: CPT | Mod: 26,RT,, | Performed by: RADIOLOGY

## 2023-12-13 PROCEDURE — 3288F FALL RISK ASSESSMENT DOCD: CPT | Mod: CPTII,S$GLB,, | Performed by: ORTHOPAEDIC SURGERY

## 2023-12-13 PROCEDURE — 3044F HG A1C LEVEL LT 7.0%: CPT | Mod: CPTII,S$GLB,, | Performed by: ORTHOPAEDIC SURGERY

## 2023-12-13 PROCEDURE — 3061F NEG MICROALBUMINURIA REV: CPT | Mod: CPTII,S$GLB,, | Performed by: ORTHOPAEDIC SURGERY

## 2023-12-13 PROCEDURE — 4010F ACE/ARB THERAPY RXD/TAKEN: CPT | Mod: CPTII,S$GLB,, | Performed by: ORTHOPAEDIC SURGERY

## 2023-12-13 PROCEDURE — 3288F PR FALLS RISK ASSESSMENT DOCUMENTED: ICD-10-PCS | Mod: CPTII,S$GLB,, | Performed by: ORTHOPAEDIC SURGERY

## 2023-12-13 PROCEDURE — 1160F RVW MEDS BY RX/DR IN RCRD: CPT | Mod: CPTII,S$GLB,, | Performed by: ORTHOPAEDIC SURGERY

## 2023-12-13 PROCEDURE — 1159F PR MEDICATION LIST DOCUMENTED IN MEDICAL RECORD: ICD-10-PCS | Mod: CPTII,S$GLB,, | Performed by: ORTHOPAEDIC SURGERY

## 2023-12-13 PROCEDURE — 3008F BODY MASS INDEX DOCD: CPT | Mod: CPTII,S$GLB,, | Performed by: ORTHOPAEDIC SURGERY

## 2023-12-13 PROCEDURE — 4010F PR ACE/ARB THEARPY RXD/TAKEN: ICD-10-PCS | Mod: CPTII,S$GLB,, | Performed by: ORTHOPAEDIC SURGERY

## 2023-12-13 PROCEDURE — 3066F NEPHROPATHY DOC TX: CPT | Mod: CPTII,S$GLB,, | Performed by: ORTHOPAEDIC SURGERY

## 2023-12-13 PROCEDURE — 1101F PR PT FALLS ASSESS DOC 0-1 FALLS W/OUT INJ PAST YR: ICD-10-PCS | Mod: CPTII,S$GLB,, | Performed by: ORTHOPAEDIC SURGERY

## 2023-12-13 PROCEDURE — 3044F PR MOST RECENT HEMOGLOBIN A1C LEVEL <7.0%: ICD-10-PCS | Mod: CPTII,S$GLB,, | Performed by: ORTHOPAEDIC SURGERY

## 2023-12-13 PROCEDURE — 1125F AMNT PAIN NOTED PAIN PRSNT: CPT | Mod: CPTII,S$GLB,, | Performed by: ORTHOPAEDIC SURGERY

## 2023-12-13 PROCEDURE — 99999 PR PBB SHADOW E&M-EST. PATIENT-LVL IV: CPT | Mod: PBBFAC,,, | Performed by: ORTHOPAEDIC SURGERY

## 2023-12-13 PROCEDURE — 73080 X-RAY EXAM OF ELBOW: CPT | Mod: TC,RT

## 2023-12-13 PROCEDURE — 1101F PT FALLS ASSESS-DOCD LE1/YR: CPT | Mod: CPTII,S$GLB,, | Performed by: ORTHOPAEDIC SURGERY

## 2023-12-13 PROCEDURE — 3008F PR BODY MASS INDEX (BMI) DOCUMENTED: ICD-10-PCS | Mod: CPTII,S$GLB,, | Performed by: ORTHOPAEDIC SURGERY

## 2023-12-13 PROCEDURE — 99999 PR PBB SHADOW E&M-EST. PATIENT-LVL IV: ICD-10-PCS | Mod: PBBFAC,,, | Performed by: ORTHOPAEDIC SURGERY

## 2023-12-13 PROCEDURE — 1160F PR REVIEW ALL MEDS BY PRESCRIBER/CLIN PHARMACIST DOCUMENTED: ICD-10-PCS | Mod: CPTII,S$GLB,, | Performed by: ORTHOPAEDIC SURGERY

## 2023-12-13 NOTE — PROGRESS NOTES
Subjective:     Patient ID: Mitch Whittaker is a 68 y.o. male.    Chief Complaint: Pain of the Right Elbow      HPI:  The patient is a 68-year-old male status post right carpal tunnel release who was seen yesterday for suture removal and complained of right elbow lateral epicondyle pain.  I asked him to come in today for radiographs for thoroughness.  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 RNA NEGATIVE 5/2015, 2021, 2022    History of colon polyps     HPTH (hyperparathyroidism)     Hyperlipidemia     Hypertension associated with stage 3 chronic kidney disease due to type 2 diabetes mellitus     LBBB (left bundle branch block) 12/20/2021    Morbid obesity with BMI of 45.0-49.9, adult     Nephrolithiasis 6/7/2013    PCO (posterior capsular opacification), left 03/04/2019    Proteinuria     resolved s/p transplant    S/P kidney transplant     Sleep apnea     Type 2 diabetes, uncontrolled, with retinopathy     Type II diabetes mellitus with renal manifestations      Past Surgical History:   Procedure Laterality Date    CARDIAC CATHETERIZATION  2008    normal coronary    CARPAL TUNNEL RELEASE Right 12/1/2023     Procedure: RELEASE, CARPAL TUNNEL;  Surgeon: Noel Almonte MD;  Location: [facility] OR;  Service: Orthopedics;  Laterality: Right;    COLONOSCOPY N/A 4/5/2018    Procedure: COLONOSCOPY;  Surgeon: Chava Ronquillo MD;  Location: [facility] ENDO;  Service: Endoscopy;  Laterality: N/A;    COLONOSCOPY N/A 5/2/2022    Procedure: COLONOSCOPY;  Surgeon: Alix Puente MD;  Location: [facility] ENDO;  Service: Endoscopy;  Laterality: N/A;    COLONOSCOPY N/A 6/7/2023    Procedure: COLONOSCOPY - rule out CMV  Cardiac clearance/Eliquis hold approval received on 05/21/23 per Dr. Meade, cardiology.  Note in encounters.  LB;  Surgeon: Daniella Shah MD;  Location: [facility] ENDO;  Service: Endoscopy;  Laterality: N/A;    KIDNEY TRANSPLANT      RETINAL LASER PROCEDURE       Family History   Problem Relation Age of Onset    Diabetes Mother     Hypertension Mother     Heart failure Mother     Kidney disease Sister         ESRD    Diabetes Sister     Diabetes Maternal Grandmother     Cancer Neg Hx      Social History     Socioeconomic History    Marital status:     Number of children: 2   Occupational History    Occupation: retired     Employer: Retired   Tobacco Use    Smoking status: Former     Current packs/day: 0.00     Types: Cigarettes     Quit date: 5/25/2013     Years since quitting: 10.5     Passive exposure: Past    Smokeless tobacco: Former     Quit date: 6/11/2013    Tobacco comments:     used marijuana since [id number], stopped after started dialysis   Substance and Sexual Activity    Alcohol use: No     Alcohol/week: 0.0 standard drinks of alcohol    Drug use: No     Comment:      Sexual activity: Never   Social History Narrative    . Lives with spouse. Has 2 children. 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     Social Determinants of Health     Financial Resource Strain: Low Risk  (10/2/2020)    Overall Financial Resource Strain (CARDIA)     Difficulty of Paying  "Living Expenses: Not hard at all   Transportation Needs: No Transportation Needs (10/2/2020)    PRAPARE - Transportation     Lack of Transportation (Medical): No     Lack of Transportation (Non-Medical): No   Stress: No Stress Concern Present (10/2/2020)    [place] [employer] - Occupational Stress Questionnaire     Feeling of Stress : Not at all     Medication List with Changes/Refills   Current Medications    AMITRIPTYLINE (ELAVIL) 25 MG TABLET    Take 1 tablet (25 mg total) by mouth nightly as needed for Insomnia.    APIXABAN (ELIQUIS) 5 MG TAB    Take 1 tablet (5 mg total) by mouth 2 (two) times daily.    ASPIRIN (ECOTRIN) 81 MG EC TABLET    Take 1 tablet by mouth Daily.     BD ULTRA-FINE MINI PEN NEEDLE 31 GAUGE X 3/16" NDLE    Use to inject insulin as needed up to 4 times daily    BLOOD SUGAR DIAGNOSTIC (CONTOUR NEXT TEST STRIPS) STRP    by Misc.(Non-Drug; Combo Route) route 4 (four) times daily before meals and nightly.    BLOOD-GLUCOSE METER (FREESTYLE LITE METER) KIT    1 each 4 (four) times daily.    CALCITRIOL (ROCALTROL) 0.25 MCG CAP    Take 1 capsule (0.25 mcg total) by mouth once daily.    COVID GLJ99-02,12UP,,ANDU,,PF, (SPIKEVAX 5365-7169,12Y UP,,PF,) 50 MCG/0.5 ML INJECTION    Inject into the muscle.    FAMOTIDINE (PEPCID) 20 MG TABLET    TAKE ONE TABLET BY MOUTH EVERY EVENING    FISH OIL-OMEGA-3 FATTY ACIDS 300-1,000 MG CAPSULE    Take 1 g by mouth once daily.    FUROSEMIDE (LASIX) 80 MG TABLET    Take one-half tablet (40 mg) by mouth 2 (two) times daily.    GABAPENTIN (NEURONTIN) 100 MG CAPSULE    Take 1 capsule (100 mg total) by mouth 3 (three) times daily.    GLIMEPIRIDE (AMARYL) 2 MG TABLET    Take 1 tablet (2 mg total) by mouth before breakfast.    HYDROCODONE-ACETAMINOPHEN (NORCO) 5-325 MG PER TABLET    Take 1 tablet by mouth every 6 (six) hours as needed for Pain.    INSULIN (LANTUS SOLOSTAR U-100 INSULIN) GLARGINE 100 UNITS/ML SUBQ PEN    Inject 35 Units into the skin 2 " (two) times daily.    INSULIN ASPART U-100 (NOVOLOG FLEXPEN U-100 INSULIN) 100 UNIT/ML (3 ML) INPN PEN    Inject 25 Units into the skin 3 (three) times daily with meals.    KETOCONAZOLE (NIZORAL) 200 MG TAB    Take HALF A tablet (100 mg total) by mouth once daily.    LANCETS (MICROLET LANCET) MISC    100 lancets by Misc.(Non-Drug; Combo Route) route 4 (four) times daily before meals and nightly.    MAGNESIUM OXIDE (MAG-OX) 400 MG (241.3 MG MAGNESIUM) TABLET    Take 1 tablet (400 mg total) by mouth once daily.    METOPROLOL SUCCINATE (TOPROL-XL) 25 MG 24 HR TABLET    Take 1 tablet (25 mg total) by mouth 2 (two) times daily.    MONTELUKAST (SINGULAIR) 10 MG TABLET    Take 1 tablet (10 mg total) by mouth every evening.    MULTIVITAMIN (THERAGRAN) TABLET    Take 1 tablet by mouth once daily.    MYCOPHENOLATE (CELLCEPT) 250 MG CAP    Take 2 capsules (500 mg total) by mouth 2 (two) times daily.    ONDANSETRON (ZOFRAN) 4 MG TABLET    Take 1 tablet (4 mg total) by mouth every 6 (six) hours.    POTASSIUM CHLORIDE SA (K-DUR,KLOR-CON) 20 MEQ TABLET    Take 2 tablets (40 mEq total) by mouth once daily.    PREDNISONE (DELTASONE) 5 MG TABLET    Take 1 tablet (5 mg total) by mouth once daily.    PROMETHAZINE-DEXTROMETHORPHAN (PROMETHAZINE-DM) 6.25-15 MG/5 ML SYRP    Take 5 mLs by mouth every 6 (six) hours as needed (cough).    ROSUVASTATIN (CRESTOR) 40 MG TAB    Take 1 tablet (40 mg total) by mouth once daily in the evening.    SACUBITRIL-VALSARTAN (ENTRESTO)  MG PER TABLET    Take 1 tablet by mouth 2 (two) times daily.    TACROLIMUS (PROGRAF) 1 MG CAP    Take 5 mg every morning and 4 mg every evening (9 mg per day total).    TACROLIMUS (PROGRAF) 1 MG CAP    Take 5 mg by mouth every moring and take 4 mg by mouth in the evening    TAMSULOSIN (FLOMAX) 0.4 MG CAP    Take 1 capsule (0.4 mg total) by mouth once daily.    TRAMADOL (ULTRAM) 50 MG TABLET    Take 1 tablet (50 mg total) by mouth every 4 (four) hours as needed for  Pain.    TRIAMCINOLONE ACETONIDE 0.1% (KENALOG) 0.1 % OINTMENT    Apply topically 2 (two) times daily. Use on bilateral lower legs.    VALGANCICLOVIR (VALCYTE) 450 MG TAB    Take 1 tablet (450 mg total) by mouth every other day.     Review of patient's allergies indicates:   Allergen Reactions    Lisinopril Other (See Comments)     Other reaction(s):  cough    Actos  [pioglitazone] Other (See Comments)     Other reaction(s): CHF    Metformin Other (See Comments)     Other reaction(s): renal insuff  Other reaction(s): CHF     Review of Systems   Constitutional: Negative for malaise/fatigue.   HENT:  Negative for hearing loss.    Eyes:  Negative for double vision and visual disturbance.   Cardiovascular:  Positive for chest pain, irregular heartbeat and syncope.   Respiratory:  Positive for sleep disturbances due to breathing. Negative for shortness of breath.    Endocrine: Negative for cold intolerance.   Hematologic/Lymphatic: Does not bruise/bleed easily.   Skin:  Negative for poor wound healing and suspicious lesions.   Musculoskeletal:  Positive for arthritis, falls, joint pain, joint swelling and neck pain. Negative for gout.   Gastrointestinal:  Positive for diarrhea. Negative for nausea and vomiting.   Genitourinary:  Positive for frequency, hesitancy, incomplete emptying and nocturia. Negative for dysuria.   Neurological:  Positive for dizziness, numbness, paresthesias and sensory change.   Psychiatric/Behavioral:  Negative for depression, memory loss and substance abuse. The patient is not nervous/anxious.    Allergic/Immunologic: Negative for persistent infections.       Objective:   Body mass index is 41.04 kg/m².  There were no vitals filed for this visit.             General    Constitutional: He is oriented to person, place, and time. He appears well-developed and well-nourished. No distress.   HENT:   Head: Normocephalic.   Eyes: EOM are normal.   Pulmonary/Chest: Effort normal.   Neurological: He is  oriented to person, place, and time.   Psychiatric: He has a normal mood and affect.             Right Hand/Wrist Exam     Inspection   Scars: Wrist - present Hand -  present  Effusion: Wrist - absent Hand -  absent    Pain   Wrist - The patient exhibits pain of the lateral epicondyle.    Other     Neuorologic Exam    Median Distribution: normal  Ulnar Distribution: normal  Radial Distribution: normal    Comments:  The patient has tenderness right elbow lateral epicondyle.  He has a olecranon osteophyte with local swelling of the olecranon bursa.  There is no sign of infection.          Vascular Exam       Capillary Refill  Right Hand: normal capillary refill          Relevant imaging results reviewed and interpreted by me, discussed with the patient and / or family today radiographs right elbow were reviewed which showed a olecranon osteophyte and smaller osteophyte about the lateral epicondyle  Assessment:     Encounter Diagnosis   Name Primary?    Right elbow pain Yes    Status post right carpal tunnel release   Right elbow lateral epicondylitis    Plan:       The patient will follow-up in 3 weeks for re-evaluation.  Wound care was discussed.  He was given a sling for comfort.  Elevation and motion was encouraged.              Disclaimer: This note was prepared using a voice recognition system and is likely to have sound alike errors within the text.

## 2023-12-19 RX ORDER — KETOCONAZOLE 200 MG/1
100 TABLET ORAL DAILY
Qty: 15 TABLET | Refills: 9 | Status: SHIPPED | OUTPATIENT
Start: 2023-12-19

## 2023-12-20 ENCOUNTER — LAB VISIT (OUTPATIENT)
Dept: LAB | Facility: HOSPITAL | Age: 68
End: 2023-12-20
Attending: INTERNAL MEDICINE
Payer: COMMERCIAL

## 2023-12-20 DIAGNOSIS — Z79.4 UNCONTROLLED TYPE 2 DIABETES MELLITUS WITH HYPERGLYCEMIA, WITH LONG-TERM CURRENT USE OF INSULIN: ICD-10-CM

## 2023-12-20 DIAGNOSIS — E11.65 UNCONTROLLED TYPE 2 DIABETES MELLITUS WITH HYPERGLYCEMIA, WITH LONG-TERM CURRENT USE OF INSULIN: ICD-10-CM

## 2023-12-20 DIAGNOSIS — Z94.0 KIDNEY REPLACED BY TRANSPLANT: ICD-10-CM

## 2023-12-20 LAB
ALBUMIN SERPL BCP-MCNC: 3.2 G/DL (ref 3.5–5.2)
ANION GAP SERPL CALC-SCNC: 11 MMOL/L (ref 8–16)
BASOPHILS NFR BLD: 0 % (ref 0–1.9)
BUN SERPL-MCNC: 47 MG/DL (ref 8–23)
CALCIUM SERPL-MCNC: 9.7 MG/DL (ref 8.7–10.5)
CHLORIDE SERPL-SCNC: 107 MMOL/L (ref 95–110)
CHOLEST SERPL-MCNC: 201 MG/DL (ref 120–199)
CHOLEST/HDLC SERPL: 3.7 {RATIO} (ref 2–5)
CO2 SERPL-SCNC: 26 MMOL/L (ref 23–29)
CREAT SERPL-MCNC: 2.3 MG/DL (ref 0.5–1.4)
DIFFERENTIAL METHOD: ABNORMAL
EOSINOPHIL NFR BLD: 3 % (ref 0–8)
ERYTHROCYTE [DISTWIDTH] IN BLOOD BY AUTOMATED COUNT: 14.1 % (ref 11.5–14.5)
EST. GFR  (NO RACE VARIABLE): 29.8 ML/MIN/1.73 M^2
ESTIMATED AVG GLUCOSE: 186 MG/DL (ref 68–131)
GLUCOSE SERPL-MCNC: 164 MG/DL (ref 70–110)
HBA1C MFR BLD: 8.1 % (ref 4–5.6)
HCT VFR BLD AUTO: 34.8 % (ref 40–54)
HDLC SERPL-MCNC: 55 MG/DL (ref 40–75)
HDLC SERPL: 27.4 % (ref 20–50)
HGB BLD-MCNC: 10.7 G/DL (ref 14–18)
IMM GRANULOCYTES # BLD AUTO: ABNORMAL K/UL (ref 0–0.04)
IMM GRANULOCYTES NFR BLD AUTO: ABNORMAL % (ref 0–0.5)
LDLC SERPL CALC-MCNC: 128.4 MG/DL (ref 63–159)
LYMPHOCYTES NFR BLD: 19 % (ref 18–48)
MCH RBC QN AUTO: 25.5 PG (ref 27–31)
MCHC RBC AUTO-ENTMCNC: 30.7 G/DL (ref 32–36)
MCV RBC AUTO: 83 FL (ref 82–98)
MONOCYTES NFR BLD: 11 % (ref 4–15)
NEUTROPHILS NFR BLD: 66 % (ref 38–73)
NEUTS BAND NFR BLD MANUAL: 1 %
NONHDLC SERPL-MCNC: 146 MG/DL
NRBC BLD-RTO: 0 /100 WBC
PHOSPHATE SERPL-MCNC: 3.2 MG/DL (ref 2.7–4.5)
PLATELET # BLD AUTO: 272 K/UL (ref 150–450)
PLATELET BLD QL SMEAR: ABNORMAL
PMV BLD AUTO: 11.3 FL (ref 9.2–12.9)
POTASSIUM SERPL-SCNC: 4.1 MMOL/L (ref 3.5–5.1)
RBC # BLD AUTO: 4.2 M/UL (ref 4.6–6.2)
SODIUM SERPL-SCNC: 144 MMOL/L (ref 136–145)
TRIGL SERPL-MCNC: 88 MG/DL (ref 30–150)
WBC # BLD AUTO: 3.09 K/UL (ref 3.9–12.7)

## 2023-12-20 PROCEDURE — 83036 HEMOGLOBIN GLYCOSYLATED A1C: CPT | Performed by: INTERNAL MEDICINE

## 2023-12-20 PROCEDURE — 85027 COMPLETE CBC AUTOMATED: CPT | Performed by: INTERNAL MEDICINE

## 2023-12-20 PROCEDURE — 80069 RENAL FUNCTION PANEL: CPT | Performed by: INTERNAL MEDICINE

## 2023-12-20 PROCEDURE — 36415 COLL VENOUS BLD VENIPUNCTURE: CPT | Performed by: INTERNAL MEDICINE

## 2023-12-20 PROCEDURE — 80061 LIPID PANEL: CPT | Performed by: INTERNAL MEDICINE

## 2023-12-20 PROCEDURE — 85007 BL SMEAR W/DIFF WBC COUNT: CPT | Performed by: INTERNAL MEDICINE

## 2023-12-20 PROCEDURE — 80197 ASSAY OF TACROLIMUS: CPT | Performed by: INTERNAL MEDICINE

## 2023-12-21 ENCOUNTER — PATIENT MESSAGE (OUTPATIENT)
Dept: TRANSPLANT | Facility: CLINIC | Age: 68
End: 2023-12-21
Payer: COMMERCIAL

## 2023-12-21 ENCOUNTER — TELEPHONE (OUTPATIENT)
Dept: TRANSPLANT | Facility: CLINIC | Age: 68
End: 2023-12-21
Payer: COMMERCIAL

## 2023-12-21 LAB
CMV DNA SPEC QL NAA+PROBE: NORMAL
CYTOMEGALOVIRUS PCR, QUANT: NOT DETECTED IU/ML
TACROLIMUS BLD-MCNC: 6.4 NG/ML (ref 5–15)

## 2023-12-21 NOTE — TELEPHONE ENCOUNTER
Patient repeated back and voice a understanding of orders.  ----- Message from Marci Pan MD sent at 12/21/2023 11:01 AM CST -----  Needs more water intake, better sugar control.   WBC improving. Pending CMV PCR. Tac level acceptable.

## 2023-12-21 NOTE — PROGRESS NOTES
Needs more water intake, better sugar control.   WBC improving. Pending CMV PCR. Tac level acceptable.

## 2023-12-21 NOTE — TELEPHONE ENCOUNTER
Patient repeated back and voice a understanding of orders.  Next CBC on 1/31/2024.  ----- Message from Marci Pan MD sent at 12/21/2023  1:19 PM CST -----  CMV negative, No more CMV needed to be checked. CBC in 6 weeks

## 2023-12-21 NOTE — TELEPHONE ENCOUNTER
Left voice message to call coordinator back.  ----- Message from Marci Pan MD sent at 12/21/2023  1:19 PM CST -----  CMV negative, No more CMV needed to be checked. CBC in 6 weeks

## 2023-12-23 NOTE — TELEPHONE ENCOUNTER
Care Due:                  Date            Visit Type   Department     Provider  --------------------------------------------------------------------------------                                EP -                              PRIMARY      [facility] INTER[facility]  Last Visit: 03-      CARE ([facility])   MEDICINE       Alix Kowalski  Next Visit: None Scheduled  None         None Found                                                            Last  Test          Frequency    Reason                     Performed    Due Date  --------------------------------------------------------------------------------    Office Visit  12 months..  magnesium................  03- 03-    [facility] Embedded Care Due Messages. Reference number: [id number].   12/23/2023 5:08:53 AM CST

## 2023-12-26 RX ORDER — INSULIN GLARGINE 100 [IU]/ML
35 INJECTION, SOLUTION SUBCUTANEOUS 2 TIMES DAILY
Qty: 60 ML | Refills: 0 | Status: CANCELLED | OUTPATIENT
Start: 2023-12-26

## 2023-12-26 RX ORDER — INSULIN GLARGINE 100 [IU]/ML
35 INJECTION, SOLUTION SUBCUTANEOUS 2 TIMES DAILY
Qty: 60 ML | Refills: 0 | Status: SHIPPED | OUTPATIENT
Start: 2023-12-26

## 2023-12-26 NOTE — TELEPHONE ENCOUNTER
Refill Routing Note   Medication(s) are not appropriate for processing by [facility] for the following reason(s):      Patient seen in ED/Hospital since LOV with provider  Required labs abnormal    [facility] action(s):  Defer Care Due:  None identified     Medication Therapy Plan: FLOS    Pharmacist review requested: Yes     Appointments  past 12m or future 3m with PCP    Date Provider   Last Visit   3/28/2023 Alix Kowalski DO   Next Visit   3/14/2024 Alix Kowalski DO   ED visits in past 90 days: 0        Note composed:8:41 AM 12/26/2023

## 2023-12-26 NOTE — TELEPHONE ENCOUNTER
Mitch Whittaker  is requesting a refill authorization.  Brief Assessment and Rationale for Refill:  Approve     Medication Therapy Plan:  FLOS; ED visit notes reviewed, FOV scheduled; renal fxn reviewed      Pharmacist review requested: Yes   Extended chart review required: Yes   Comments:     Note composed:10:12 AM 12/26/2023

## 2023-12-26 NOTE — TELEPHONE ENCOUNTER
Mitch Whittaker  is requesting a refill authorization.  Brief Assessment and Rationale for Refill:  Approve     Medication Therapy Plan:  FLOS; ED visit notes reviewed, FOV scheduled; renal fxn reviewed      Pharmacist review requested: Yes   Extended chart review required: Yes   Comments:     Note composed:10:11 AM 12/26/2023

## 2023-12-27 NOTE — TELEPHONE ENCOUNTER
No care due was identified.  [facility] Embedded Care Due Messages. Reference number: [id number].   12/27/2023 4:16:23 PM CST

## 2023-12-28 ENCOUNTER — OFFICE VISIT (OUTPATIENT)
Dept: CARDIOLOGY | Facility: CLINIC | Age: 68
End: 2023-12-28
Payer: COMMERCIAL

## 2023-12-28 VITALS
OXYGEN SATURATION: 97 % | HEIGHT: 66 IN | RESPIRATION RATE: 16 BRPM | WEIGHT: 268.31 LBS | SYSTOLIC BLOOD PRESSURE: 108 MMHG | HEART RATE: 85 BPM | BODY MASS INDEX: 43.12 KG/M2 | DIASTOLIC BLOOD PRESSURE: 65 MMHG

## 2023-12-28 DIAGNOSIS — N18.30 HYPERTENSION ASSOCIATED WITH STAGE 3 CHRONIC KIDNEY DISEASE DUE TO TYPE 2 DIABETES MELLITUS: ICD-10-CM

## 2023-12-28 DIAGNOSIS — Z86.718 HISTORY OF DVT (DEEP VEIN THROMBOSIS): ICD-10-CM

## 2023-12-28 DIAGNOSIS — Z79.01 CHRONIC ANTICOAGULATION: ICD-10-CM

## 2023-12-28 DIAGNOSIS — G47.33 OBSTRUCTIVE SLEEP APNEA: ICD-10-CM

## 2023-12-28 DIAGNOSIS — N18.30 STAGE 3 CHRONIC KIDNEY DISEASE, UNSPECIFIED WHETHER STAGE 3A OR 3B CKD: ICD-10-CM

## 2023-12-28 DIAGNOSIS — I12.9 HYPERTENSION ASSOCIATED WITH STAGE 3 CHRONIC KIDNEY DISEASE DUE TO TYPE 2 DIABETES MELLITUS: ICD-10-CM

## 2023-12-28 DIAGNOSIS — I49.3 PVC'S (PREMATURE VENTRICULAR CONTRACTIONS): Primary | ICD-10-CM

## 2023-12-28 DIAGNOSIS — I44.7 LBBB (LEFT BUNDLE BRANCH BLOCK): ICD-10-CM

## 2023-12-28 DIAGNOSIS — I50.42 CHRONIC COMBINED SYSTOLIC AND DIASTOLIC CONGESTIVE HEART FAILURE: ICD-10-CM

## 2023-12-28 DIAGNOSIS — E11.22 HYPERTENSION ASSOCIATED WITH STAGE 3 CHRONIC KIDNEY DISEASE DUE TO TYPE 2 DIABETES MELLITUS: ICD-10-CM

## 2023-12-28 DIAGNOSIS — E11.69 TYPE 2 DIABETES MELLITUS WITH OTHER SPECIFIED COMPLICATION, WITH LONG-TERM CURRENT USE OF INSULIN: ICD-10-CM

## 2023-12-28 DIAGNOSIS — E66.01 MORBID OBESITY WITH BMI OF 40.0-44.9, ADULT: ICD-10-CM

## 2023-12-28 DIAGNOSIS — I25.118 CORONARY ARTERY DISEASE OF NATIVE ARTERY OF NATIVE HEART WITH STABLE ANGINA PECTORIS: ICD-10-CM

## 2023-12-28 DIAGNOSIS — E66.01 CLASS 3 SEVERE OBESITY DUE TO EXCESS CALORIES WITH SERIOUS COMORBIDITY AND BODY MASS INDEX (BMI) OF 40.0 TO 44.9 IN ADULT: ICD-10-CM

## 2023-12-28 DIAGNOSIS — Z79.4 TYPE 2 DIABETES MELLITUS WITH OTHER SPECIFIED COMPLICATION, WITH LONG-TERM CURRENT USE OF INSULIN: ICD-10-CM

## 2023-12-28 PROCEDURE — 1160F RVW MEDS BY RX/DR IN RCRD: CPT | Mod: CPTII,S$GLB,, | Performed by: INTERNAL MEDICINE

## 2023-12-28 PROCEDURE — 1159F PR MEDICATION LIST DOCUMENTED IN MEDICAL RECORD: ICD-10-PCS | Mod: CPTII,S$GLB,, | Performed by: INTERNAL MEDICINE

## 2023-12-28 PROCEDURE — 1159F MED LIST DOCD IN RCRD: CPT | Mod: CPTII,S$GLB,, | Performed by: INTERNAL MEDICINE

## 2023-12-28 PROCEDURE — 4010F PR ACE/ARB THEARPY RXD/TAKEN: ICD-10-PCS | Mod: CPTII,S$GLB,, | Performed by: INTERNAL MEDICINE

## 2023-12-28 PROCEDURE — 3288F FALL RISK ASSESSMENT DOCD: CPT | Mod: CPTII,S$GLB,, | Performed by: INTERNAL MEDICINE

## 2023-12-28 PROCEDURE — 3061F PR NEG MICROALBUMINURIA RESULT DOCUMENTED/REVIEW: ICD-10-PCS | Mod: CPTII,S$GLB,, | Performed by: INTERNAL MEDICINE

## 2023-12-28 PROCEDURE — 3288F PR FALLS RISK ASSESSMENT DOCUMENTED: ICD-10-PCS | Mod: CPTII,S$GLB,, | Performed by: INTERNAL MEDICINE

## 2023-12-28 PROCEDURE — 99214 OFFICE O/P EST MOD 30 MIN: CPT | Mod: S$GLB,,, | Performed by: INTERNAL MEDICINE

## 2023-12-28 PROCEDURE — 3078F DIAST BP <80 MM HG: CPT | Mod: CPTII,S$GLB,, | Performed by: INTERNAL MEDICINE

## 2023-12-28 PROCEDURE — 3066F NEPHROPATHY DOC TX: CPT | Mod: CPTII,S$GLB,, | Performed by: INTERNAL MEDICINE

## 2023-12-28 PROCEDURE — 3052F HG A1C>EQUAL 8.0%<EQUAL 9.0%: CPT | Mod: CPTII,S$GLB,, | Performed by: INTERNAL MEDICINE

## 2023-12-28 PROCEDURE — 4010F ACE/ARB THERAPY RXD/TAKEN: CPT | Mod: CPTII,S$GLB,, | Performed by: INTERNAL MEDICINE

## 2023-12-28 PROCEDURE — 3074F SYST BP LT 130 MM HG: CPT | Mod: CPTII,S$GLB,, | Performed by: INTERNAL MEDICINE

## 2023-12-28 PROCEDURE — 3061F NEG MICROALBUMINURIA REV: CPT | Mod: CPTII,S$GLB,, | Performed by: INTERNAL MEDICINE

## 2023-12-28 PROCEDURE — 3074F PR MOST RECENT SYSTOLIC BLOOD PRESSURE < 130 MM HG: ICD-10-PCS | Mod: CPTII,S$GLB,, | Performed by: INTERNAL MEDICINE

## 2023-12-28 PROCEDURE — 1160F PR REVIEW ALL MEDS BY PRESCRIBER/CLIN PHARMACIST DOCUMENTED: ICD-10-PCS | Mod: CPTII,S$GLB,, | Performed by: INTERNAL MEDICINE

## 2023-12-28 PROCEDURE — 3008F PR BODY MASS INDEX (BMI) DOCUMENTED: ICD-10-PCS | Mod: CPTII,S$GLB,, | Performed by: INTERNAL MEDICINE

## 2023-12-28 PROCEDURE — 99214 PR OFFICE/OUTPT VISIT, EST, LEVL IV, 30-39 MIN: ICD-10-PCS | Mod: S$GLB,,, | Performed by: INTERNAL MEDICINE

## 2023-12-28 PROCEDURE — 3078F PR MOST RECENT DIASTOLIC BLOOD PRESSURE < 80 MM HG: ICD-10-PCS | Mod: CPTII,S$GLB,, | Performed by: INTERNAL MEDICINE

## 2023-12-28 PROCEDURE — 3008F BODY MASS INDEX DOCD: CPT | Mod: CPTII,S$GLB,, | Performed by: INTERNAL MEDICINE

## 2023-12-28 PROCEDURE — 3052F PR MOST RECENT HEMOGLOBIN A1C LEVEL 8.0 - < 9.0%: ICD-10-PCS | Mod: CPTII,S$GLB,, | Performed by: INTERNAL MEDICINE

## 2023-12-28 PROCEDURE — 3066F PR DOCUMENTATION OF TREATMENT FOR NEPHROPATHY: ICD-10-PCS | Mod: CPTII,S$GLB,, | Performed by: INTERNAL MEDICINE

## 2023-12-28 PROCEDURE — 1101F PT FALLS ASSESS-DOCD LE1/YR: CPT | Mod: CPTII,S$GLB,, | Performed by: INTERNAL MEDICINE

## 2023-12-28 PROCEDURE — 99999 PR PBB SHADOW E&M-EST. PATIENT-LVL V: ICD-10-PCS | Mod: PBBFAC,,, | Performed by: INTERNAL MEDICINE

## 2023-12-28 PROCEDURE — 99999 PR PBB SHADOW E&M-EST. PATIENT-LVL V: CPT | Mod: PBBFAC,,, | Performed by: INTERNAL MEDICINE

## 2023-12-28 PROCEDURE — 1101F PR PT FALLS ASSESS DOC 0-1 FALLS W/OUT INJ PAST YR: ICD-10-PCS | Mod: CPTII,S$GLB,, | Performed by: INTERNAL MEDICINE

## 2023-12-28 RX ORDER — ROSUVASTATIN CALCIUM 40 MG/1
40 TABLET, COATED ORAL DAILY
Qty: 90 TABLET | Refills: 0 | Status: SHIPPED | OUTPATIENT
Start: 2023-12-28 | End: 2024-03-25 | Stop reason: SDUPTHER

## 2023-12-28 RX ORDER — POTASSIUM CHLORIDE 20 MEQ/1
40 TABLET, EXTENDED RELEASE ORAL DAILY
Qty: 180 TABLET | Refills: 1 | Status: SHIPPED | OUTPATIENT
Start: 2023-12-28

## 2023-12-28 RX ORDER — SEMAGLUTIDE 0.68 MG/ML
0.25 INJECTION, SOLUTION SUBCUTANEOUS
Qty: 3 ML | Refills: 1 | Status: SHIPPED | OUTPATIENT
Start: 2023-12-28 | End: 2024-02-22 | Stop reason: SDUPTHER

## 2023-12-28 RX ORDER — SACUBITRIL AND VALSARTAN 97; 103 MG/1; MG/1
1 TABLET, FILM COATED ORAL 2 TIMES DAILY
Qty: 180 TABLET | Refills: 1 | Status: SHIPPED | OUTPATIENT
Start: 2023-12-28

## 2023-12-28 NOTE — PROGRESS NOTES
Subjective:   Patient ID:  Mitch Whittaker is a 68 y.o. male who presents for cardiac consult of No chief complaint on file.      The patient came in today for cardiac consult of No chief complaint on file.      Mitch Whittaker is a 68 y.o. male with current medical conditions non obs CAD, h/o COVID 19, DVT on Eliquis, Obesity,  HFrEF 45-50%, CKD, DM2, MARION, HTN, HLD, ESRD s/p renal transplant presents for follow up CV eval.     3/15/19  Pt of Dr. Morris, last seen in clinic 2016. Pt presents after recent admission for CHF at [facility] -   Patient is a 63-year-old gentleman who presented to the hospital with shortness of breath. He was found to have pulmonary edema on chest x-ray and bilateral pedal edema. He was admitted for treatment of acute CHF. Echo showed reduced ejection fraction at 25-30%. He was treated with IV Lasix 80 mg twice daily that has been transitioned to Lasix p.o. Patient is not euvolemic and his creatinine is trending up for this reason I am cutting back on Lasix to 40 mg twice daily. Can follow-up with cardiology in 3 days. He sees one [facility]. He was seen by Dr. Omayra Esparza from [facility]. Recommendation is to start him on Entresto once his renal functions stabilize.Pt had edema and SOB while in bed and then went to hospital. Prior to admission he was on lasix PRN. He will see Dr. Pacheco for nephro eval, transplant nephro Dr. Dejesus.  Has been feeling good on lasix 40 BID, has been walking well overall. Does not have CPAP machine.     2/15/23  ER eval last month - Patient sent to the ER for elevated d dimer, d dimer over 1 noted on outpatient lab work done on 1/27, he has been c/o right upper back pain, exam normal, vital signs reviewed and normal, outpatient lab work and imaging reviewed, abnormalities noted, V/Q ordered due to patient's CKD and inability to receive contrast, V/Q results reviewed and noted to be negative.  Patient stable for discharge home with further follow up by  primary care.  No further imaging or lab work indicated at this time.      His wife recently had CABG at [facility], returned on Friday. BP and Hr well controlled. BMI 41 - 267. Pt had neck/back pain which radiated to R shoulder and had elevated D dimer which had neg V/Q.     12/28/23  Nuc stress neg for ischemia 10/2023, EF 40-45% at rest. ECHO 10/2023 with low normal LV function, normal RV function.   Holter 10/2023 with occ PVCs, freq PACs, AVG HR 93 bpm, neg for Afib.     Had eval with [facility] for preop clearance - told to increase BB, neg stress and ECHO.     BP and HR stable. BMI 43 - 268 lbs. He sugars are worse lately, occ 200s-300s but can also drop a lot.     Patient has dec exercise tolerance.    Patient is compliant with medications.    Conclusion 10/2023         Predominant Rhythm Sinus rhythm Heart rates varied between 55 and 145 BPM with an average of 93 BPM.    There were occasional PVCs totalling 661 and averaging 13.77 per hour. (0.3%)    There were frequent PACs totalling 4131 and averaging 86.06 per hour. (1.6%)    On review, no evidence of afib observed on the recorded rhythm strips.    Results for orders placed during the hospital encounter of 08/22/23    Echo    Interpretation Summary    Left Ventricle: The left ventricle is normal in size. Moderately increased ventricular mass. Moderately increased wall thickness. Normal wall motion. There is low normal systolic function with a visually estimated ejection fraction of 50 - 55%. There is normal diastolic function.    Left Atrium: Left atrium is severely dilated.    Right Ventricle: Normal right ventricular cavity size. Wall thickness is normal. Right ventricle wall motion  is normal. Systolic function is normal.    Right Atrium: Right atrium is dilated.    IVC/SVC: Normal venous pressure at 3 mmHg.      Results for orders placed during the hospital encounter of 10/10/23    Nuclear Stress - Cardiology Interpreted    Interpretation Summary    Normal  myocardial perfusion scan. There is no evidence of myocardial ischemia or infarction.    There is a mild intensity perfusion abnormality in the anteroapical wall of the left ventricle, secondary to breast attenuation.    The gated perfusion images showed an ejection fraction of 41% at rest. The gated perfusion images showed an ejection fraction of 54% post stress.    The ECG portion of the study is uninterpretable due to left bundle branch block.    The patient reported no chest pain during the stress test.    Pt experienced abdominal discomfort post infusion. No chest pain pre, during or post stress test      LE US 12/2019  Age Indeterminate non occlusive thrombus of the Posterior Tibial Vein.  CONCLUSIONS   Technically difficult study.   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 RNA NEGATIVE 5/2015, 2021, 2022    History of colon polyps     HPTH (hyperparathyroidism)     Hyperlipidemia     Hypertension associated with stage 3 chronic kidney disease due to type 2 diabetes mellitus     LBBB (left bundle branch block) 12/20/2021    Morbid obesity with BMI of 45.0-49.9, adult     Nephrolithiasis  6/7/2013    PCO (posterior capsular opacification), left 03/04/2019    Proteinuria     resolved s/p transplant    S/P kidney transplant     Sleep apnea     Type 2 diabetes, uncontrolled, with retinopathy     Type II diabetes mellitus with renal manifestations        Past Surgical History:   Procedure Laterality Date    CARDIAC CATHETERIZATION  2008    normal coronary    CARPAL TUNNEL RELEASE Right 12/1/2023    Procedure: RELEASE, CARPAL TUNNEL;  Surgeon: Noel Almonte MD;  Location: [facility] OR;  Service: Orthopedics;  Laterality: Right;    COLONOSCOPY N/A 4/5/2018    Procedure: COLONOSCOPY;  Surgeon: Chava Ronquillo MD;  Location: [facility] ENDO;  Service: Endoscopy;  Laterality: N/A;    COLONOSCOPY N/A 5/2/2022    Procedure: COLONOSCOPY;  Surgeon: Alix Puente MD;  Location: [facility] ENDO;  Service: Endoscopy;  Laterality: N/A;    COLONOSCOPY N/A 6/7/2023    Procedure: COLONOSCOPY - rule out CMV  Cardiac clearance/Eliquis hold approval received on 05/21/23 per Dr. Meade, cardiology.  Note in encounters.  LB;  Surgeon: Daniella Shah MD;  Location: [facility] ENDO;  Service: Endoscopy;  Laterality: N/A;    KIDNEY TRANSPLANT      RETINAL LASER PROCEDURE         Social History     Tobacco Use    Smoking status: Former     Current packs/day: 0.00     Types: Cigarettes     Quit date: 5/25/2013     Years since quitting: 10.6     Passive exposure: Past    Smokeless tobacco: Former     Quit date: 6/11/2013    Tobacco comments:     used marijuana since 1440-2733, stopped after started dialysis   Substance Use Topics    Alcohol use: No     Alcohol/week: 0.0 standard drinks of alcohol    Drug use: No     Comment:         Family History   Problem Relation Age of Onset    Diabetes Mother     Hypertension Mother     Heart failure Mother     Kidney disease Sister         ESRD    Diabetes Sister     Diabetes Maternal Grandmother     Cancer Neg Hx        Patient's Medications   New Prescriptions    No medications on file  "  Previous Medications    AMITRIPTYLINE (ELAVIL) 25 MG TABLET    Take 1 tablet (25 mg total) by mouth nightly as needed for Insomnia.    APIXABAN (ELIQUIS) 5 MG TAB    Take 1 tablet (5 mg total) by mouth 2 (two) times daily.    ASPIRIN (ECOTRIN) 81 MG EC TABLET    Take 1 tablet by mouth Daily.     BD ULTRA-FINE MINI PEN NEEDLE 31 GAUGE X 3/16" NDLE    Use to inject insulin as needed up to 4 times daily    BLOOD SUGAR DIAGNOSTIC (CONTOUR NEXT TEST STRIPS) STRP    by Misc.(Non-Drug; Combo Route) route 4 (four) times daily before meals and nightly.    BLOOD-GLUCOSE METER (FREESTYLE LITE METER) KIT    1 each 4 (four) times daily.    CALCITRIOL (ROCALTROL) 0.25 MCG CAP    Take 1 capsule (0.25 mcg total) by mouth once daily.    COVID NAS75-83,12UP,,ANDU,,PF, (SPIKEVAX 2315-2810,12Y UP,,PF,) 50 MCG/0.5 ML INJECTION    Inject into the muscle.    FAMOTIDINE (PEPCID) 20 MG TABLET    TAKE ONE TABLET BY MOUTH EVERY EVENING    FISH OIL-OMEGA-3 FATTY ACIDS 300-1,000 MG CAPSULE    Take 1 g by mouth once daily.    FUROSEMIDE (LASIX) 80 MG TABLET    Take one-half tablet (40 mg) by mouth 2 (two) times daily.    GABAPENTIN (NEURONTIN) 100 MG CAPSULE    Take 1 capsule (100 mg total) by mouth 3 (three) times daily.    GLIMEPIRIDE (AMARYL) 2 MG TABLET    Take 1 tablet (2 mg total) by mouth before breakfast.    HYDROCODONE-ACETAMINOPHEN (NORCO) 5-325 MG PER TABLET    Take 1 tablet by mouth every 6 (six) hours as needed for Pain.    INSULIN (LANTUS SOLOSTAR U-100 INSULIN) GLARGINE 100 UNITS/ML SUBQ PEN    Inject 35 Units into the skin 2 (two) times daily.    INSULIN ASPART U-100 (NOVOLOG FLEXPEN U-100 INSULIN) 100 UNIT/ML (3 ML) INPN PEN    Inject 25 Units into the skin 3 (three) times daily with meals.    INSULIN GLARGINE (LANTUS U-100 INSULIN) 100 UNIT/ML INJECTION    Inject 35 Units into the skin 2 (two) times a day.    KETOCONAZOLE (NIZORAL) 200 MG TAB    Take HALF A tablet (100 mg total) by mouth once daily.    LANCETS (MICROLET " LANCET) MISC    100 lancets by Misc.(Non-Drug; Combo Route) route 4 (four) times daily before meals and nightly.    MAGNESIUM OXIDE (MAG-OX) 400 MG (241.3 MG MAGNESIUM) TABLET    Take 1 tablet (400 mg total) by mouth once daily.    METOPROLOL SUCCINATE (TOPROL-XL) 25 MG 24 HR TABLET    Take 1 tablet (25 mg total) by mouth 2 (two) times daily.    MONTELUKAST (SINGULAIR) 10 MG TABLET    Take 1 tablet (10 mg total) by mouth every evening.    MULTIVITAMIN (THERAGRAN) TABLET    Take 1 tablet by mouth once daily.    MYCOPHENOLATE (CELLCEPT) 250 MG CAP    Take 2 capsules (500 mg total) by mouth 2 (two) times daily.    ONDANSETRON (ZOFRAN) 4 MG TABLET    Take 1 tablet (4 mg total) by mouth every 6 (six) hours.    POTASSIUM CHLORIDE SA (K-DUR,KLOR-CON) 20 MEQ TABLET    Take 2 tablets (40 mEq total) by mouth once daily.    PREDNISONE (DELTASONE) 5 MG TABLET    Take 1 tablet (5 mg total) by mouth once daily.    PROMETHAZINE-DEXTROMETHORPHAN (PROMETHAZINE-DM) 6.25-15 MG/5 ML SYRP    Take 5 mLs by mouth every 6 (six) hours as needed (cough).    ROSUVASTATIN (CRESTOR) 40 MG TAB    Take 1 tablet (40 mg total) by mouth once daily in the evening.    SACUBITRIL-VALSARTAN (ENTRESTO)  MG PER TABLET    Take 1 tablet by mouth 2 (two) times daily.    TACROLIMUS (PROGRAF) 1 MG CAP    Take 5 mg every morning and 4 mg every evening (9 mg per day total).    TACROLIMUS (PROGRAF) 1 MG CAP    Take 5 mg by mouth every moring and take 4 mg by mouth in the evening    TAMSULOSIN (FLOMAX) 0.4 MG CAP    Take 1 capsule (0.4 mg total) by mouth once daily.    TRAMADOL (ULTRAM) 50 MG TABLET    Take 1 tablet (50 mg total) by mouth every 4 (four) hours as needed for Pain.    TRIAMCINOLONE ACETONIDE 0.1% (KENALOG) 0.1 % OINTMENT    Apply topically 2 (two) times daily. Use on bilateral lower legs.    VALGANCICLOVIR (VALCYTE) 450 MG TAB    Take 1 tablet (450 mg total) by mouth every other day.   Modified Medications    No medications on file  "  Discontinued Medications    No medications on file       Review of Systems   Constitutional:  Positive for malaise/fatigue.   HENT: Negative.     Eyes: Negative.    Respiratory:  Positive for cough and shortness of breath.    Cardiovascular:  Positive for leg swelling. Negative for chest pain and palpitations.   Gastrointestinal: Negative.    Genitourinary: Negative.    Musculoskeletal:  Positive for back pain.   Skin: Negative.    Neurological:  Positive for dizziness.   Endo/Heme/Allergies: Negative.    Psychiatric/Behavioral: Negative.     All 12 systems otherwise negative.      Wt Readings from Last 3 Encounters:   12/28/23 121.7 kg (268 lb 4.8 oz)   12/13/23 118.8 kg (262 lb)   12/12/23 118.8 kg (262 lb)     Temp Readings from Last 3 Encounters:   12/08/23 97.9 °F (36.6 °C) (Temporal)   12/01/23 98 °F (36.7 °C) (Temporal)   11/13/23 97.3 °F (36.3 °C) (Temporal)     BP Readings from Last 3 Encounters:   12/28/23 108/65   12/08/23 (!) 143/72   12/01/23 (!) 100/50     Pulse Readings from Last 3 Encounters:   12/28/23 85   12/08/23 101   12/01/23 83       /65   Pulse 85   Resp 16   Ht 5' 6" (1.676 m)   Wt 121.7 kg (268 lb 4.8 oz)   SpO2 97%   BMI 43.30 kg/m²     Objective:   Physical Exam  Vitals and nursing note reviewed.   Constitutional:       General: He is not in acute distress.     Appearance: He is well-developed. He is not diaphoretic.   HENT:      Head: Normocephalic and atraumatic.      Nose: Nose normal.   Eyes:      General: No scleral icterus.     Conjunctiva/sclera: Conjunctivae normal.   Neck:      Thyroid: No thyromegaly.      Vascular: No JVD.   Cardiovascular:      Rate and Rhythm: Normal rate and regular rhythm.      Heart sounds: S1 normal and S2 normal. No murmur heard.     No friction rub. No gallop. No S3 or S4 sounds.   Pulmonary:      Effort: Pulmonary effort is normal. No respiratory distress.      Breath sounds: Normal breath sounds. No stridor. No wheezing or rales. "   Chest:      Chest wall: No tenderness.   Abdominal:      General: Bowel sounds are normal. There is no distension.      Palpations: Abdomen is soft. There is no mass.      Tenderness: There is no abdominal tenderness. There is no rebound.   Genitourinary:     Comments: Deferred  Musculoskeletal:         General: No tenderness or deformity. Normal range of motion.      Cervical back: Normal range of motion and neck supple.      Right lower leg: Edema present.      Left lower leg: Edema present.   Lymphadenopathy:      Cervical: No cervical adenopathy.   Skin:     General: Skin is warm and dry.      Coloration: Skin is not pale.      Findings: No erythema or rash.   Neurological:      Mental Status: He is alert and oriented to person, place, and time.      Motor: No abnormal muscle tone.      Coordination: Coordination normal.   Psychiatric:         Behavior: Behavior normal.         Thought Content: Thought content normal.         Judgment: Judgment normal.         Lab Results   Component Value Date     12/20/2023    K 4.1 12/20/2023     12/20/2023    CO2 26 12/20/2023    BUN 47 (H) 12/20/2023    CREATININE 2.3 (H) 12/20/2023     (H) 12/20/2023    HGBA1C 8.1 (H) 12/20/2023    MG 2.1 03/03/2022    AST 19 11/17/2023    ALT 18 11/17/2023    ALBUMIN 3.2 (L) 12/20/2023    PROT 6.4 11/17/2023    BILITOT 0.3 11/17/2023    WBC 3.09 (L) 12/20/2023    HGB 10.7 (L) 12/20/2023    HCT 34.8 (L) 12/20/2023    HCT 36 06/12/2015    MCV 83 12/20/2023     12/20/2023    INR 1.0 06/18/2015    TSH 0.999 03/11/2022    CHOL 201 (H) 12/20/2023    HDL 55 12/20/2023    LDLCALC 128.4 12/20/2023    TRIG 88 12/20/2023     (H) 08/22/2023     Assessment:      1. PVC's (premature ventricular contractions)    2. Coronary artery disease of native artery of native heart with stable angina pectoris    3. Chronic combined systolic and diastolic congestive heart failure    4. LBBB (left bundle branch block)    5. Morbid  obesity with BMI of 40.0-44.9, adult    6. Obstructive sleep apnea    7. Class 3 severe obesity due to excess calories with serious comorbidity and body mass index (BMI) of 40.0 to 44.9 in adult    8. Chronic anticoagulation    9. Hypertension associated with stage 3 chronic kidney disease due to type 2 diabetes mellitus    10. History of DVT (deep vein thrombosis)    11. Stage 3 chronic kidney disease, unspecified whether stage 3a or 3b CKD            Plan:    1. Chronic CHF EF 40-45% ; neg for TTR amyloidosis  - cont lasix, and extra PRN; metolazone 2.5 mg Mon and Fri   - titrate Toprol and Entresto;  - cont low salt diet, fluid restriction  - Nuc stress neg for ischemia 10/2023, EF 40-45% at rest.   -ECHO 10/2023 with low normal LV function, normal RV function.   - Holter 10/2023 with occ PVCs, freq PACs, AVG HR 93 bpm, neg for Afib.     2. HTN with mild edema, PVCs, PACs  - Holter 10/2023 with occ PVCs, freq PACs, AVG HR 93 bpm, neg for Afib.   - cont meds for afterload reduction   - low salt diet  - rec compression stockings  - stop hydralazine   - cont BB    3. HLD  - cont statin    4. ESRD s/p Transplant with CKD  - f/u with nephro, repeat labs and adjust tx per nephro    5. CAD, non obs  - cont meds  - Nuc stress neg for ischemia 10/2023, EF 40-45% at rest.     6. MARION  -cont CPAP    7. Obesity BMI 44 - 285 lbs --> 41 - 267-->  BMI 43 - 268 lbs.   - cont weight loss with diet/exercise     8. H/o DVT  - cont Eliquis 5 BID  - non occlusive thrombus in PTV in left but no thrombus in POP vein on left.    9. Dizziness, h/o syncope with headaches  - f/u Neuro   - likeky sec to Flomax and Proscar, can discuss with urology  - monitor BP as well    10. DM2 - A1c -9.3 --> 8.0 --> 7.5 --> 6.8 --> 7.1 --> 8.1  - titrate meds   - A1c worse, chronic  - add Ozempic low dose and titrate     11. H/O COVID 19  - long covid sx now  - cont supportive tx    Thank you for allowing me to participate in this patient's care. Please  do not hesitate to contact me with any questions or concerns. Consult note has been forwarded to the referral physician.

## 2023-12-28 NOTE — TELEPHONE ENCOUNTER
Refill request routed to [facility] Review Pool for Pharmacist Review.

## 2023-12-28 NOTE — TELEPHONE ENCOUNTER
Provider Staff:  Action required. This patient has received emergency care.     Please schedule patient for a follow up appointment within next 90 days.     Thanks!  [facility]     Appointments      Date Provider   Last Visit   3/28/2023 Alix Kowalski DO   Next Visit   3/14/2024 Alix Kowalski DO        Refill Decision Note   Mitch Whittaker  is requesting a refill authorization.  Brief Assessment and Rationale for Refill:  Approve     Medication Therapy Plan: ED documentation reviewed. No changes to therapy noted.        Pharmacist review requested: Yes   Extended chart review required: Yes   Comments:     Note composed:5:32 PM 12/28/2023

## 2024-01-08 RX ORDER — TAMSULOSIN HYDROCHLORIDE 0.4 MG/1
1 CAPSULE ORAL DAILY
Qty: 30 CAPSULE | Refills: 2 | Status: SHIPPED | OUTPATIENT
Start: 2024-01-08

## 2024-01-16 ENCOUNTER — OFFICE VISIT (OUTPATIENT)
Dept: ORTHOPEDICS | Facility: CLINIC | Age: 69
End: 2024-01-16
Payer: COMMERCIAL

## 2024-01-16 VITALS — WEIGHT: 268 LBS | BODY MASS INDEX: 43.07 KG/M2 | HEIGHT: 66 IN

## 2024-01-16 DIAGNOSIS — M77.12 LATERAL EPICONDYLITIS OF LEFT ELBOW: Primary | ICD-10-CM

## 2024-01-16 PROCEDURE — 1160F RVW MEDS BY RX/DR IN RCRD: CPT | Mod: CPTII,S$GLB,, | Performed by: ORTHOPAEDIC SURGERY

## 2024-01-16 PROCEDURE — 3288F FALL RISK ASSESSMENT DOCD: CPT | Mod: CPTII,S$GLB,, | Performed by: ORTHOPAEDIC SURGERY

## 2024-01-16 PROCEDURE — 99999 PR PBB SHADOW E&M-EST. PATIENT-LVL IV: CPT | Mod: PBBFAC,,, | Performed by: ORTHOPAEDIC SURGERY

## 2024-01-16 PROCEDURE — 1126F AMNT PAIN NOTED NONE PRSNT: CPT | Mod: CPTII,S$GLB,, | Performed by: ORTHOPAEDIC SURGERY

## 2024-01-16 PROCEDURE — 99213 OFFICE O/P EST LOW 20 MIN: CPT | Mod: 25,S$GLB,, | Performed by: ORTHOPAEDIC SURGERY

## 2024-01-16 PROCEDURE — 1101F PT FALLS ASSESS-DOCD LE1/YR: CPT | Mod: CPTII,S$GLB,, | Performed by: ORTHOPAEDIC SURGERY

## 2024-01-16 PROCEDURE — 3008F BODY MASS INDEX DOCD: CPT | Mod: CPTII,S$GLB,, | Performed by: ORTHOPAEDIC SURGERY

## 2024-01-16 PROCEDURE — 20551 NJX 1 TENDON ORIGIN/INSJ: CPT | Mod: LT,S$GLB,, | Performed by: ORTHOPAEDIC SURGERY

## 2024-01-16 PROCEDURE — 1159F MED LIST DOCD IN RCRD: CPT | Mod: CPTII,S$GLB,, | Performed by: ORTHOPAEDIC SURGERY

## 2024-01-16 RX ORDER — TRIAMCINOLONE ACETONIDE 40 MG/ML
40 INJECTION, SUSPENSION INTRA-ARTICULAR; INTRAMUSCULAR
Status: DISCONTINUED | OUTPATIENT
Start: 2024-01-16 | End: 2024-01-16 | Stop reason: HOSPADM

## 2024-01-16 RX ADMIN — TRIAMCINOLONE ACETONIDE 40 MG: 40 INJECTION, SUSPENSION INTRA-ARTICULAR; INTRAMUSCULAR at 03:01

## 2024-01-16 NOTE — PROGRESS NOTES
Subjective:     Patient ID: Mitch Whittaker is a 68 y.o. male.    Chief Complaint: Post-op Evaluation of the Right Hand      HPI:  The patient is a 68-year-old male status post right carpal tunnel release 12/01/2023 with good results.  He had a right elbow lateral epicondyle injection 12/12/2023 with good results.  He presents with left elbow lateral epicondylitis wishes to try injection of the left elbow.    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 RNA NEGATIVE 5/2015, 2021, 2022    History of colon polyps     HPTH (hyperparathyroidism)     Hyperlipidemia     Hypertension associated with stage 3 chronic kidney disease due to type 2 diabetes mellitus     LBBB (left bundle branch block) 12/20/2021    Morbid obesity with BMI of 45.0-49.9, adult     Nephrolithiasis 6/7/2013    PCO (posterior capsular opacification), left 03/04/2019    Proteinuria     resolved s/p transplant    S/P kidney transplant     Sleep apnea     Type 2 diabetes, uncontrolled, with retinopathy     Type II diabetes mellitus with renal manifestations      Past Surgical History:   Procedure Laterality Date    CARDIAC CATHETERIZATION  2008    normal coronary    CARPAL TUNNEL RELEASE Right 12/1/2023    Procedure: RELEASE,  CARPAL TUNNEL;  Surgeon: Noel Almonte MD;  Location: [facility] OR;  Service: Orthopedics;  Laterality: Right;    COLONOSCOPY N/A 4/5/2018    Procedure: COLONOSCOPY;  Surgeon: Chava Ronquillo MD;  Location: [facility] ENDO;  Service: Endoscopy;  Laterality: N/A;    COLONOSCOPY N/A 5/2/2022    Procedure: COLONOSCOPY;  Surgeon: Alix Puente MD;  Location: [facility] ENDO;  Service: Endoscopy;  Laterality: N/A;    COLONOSCOPY N/A 6/7/2023    Procedure: COLONOSCOPY - rule out CMV  Cardiac clearance/Eliquis hold approval received on 05/21/23 per Dr. Meade, cardiology.  Note in encounters.  LB;  Surgeon: Daniella Shah MD;  Location: [facility] ENDO;  Service: Endoscopy;  Laterality: N/A;    KIDNEY TRANSPLANT      RETINAL LASER PROCEDURE       Family History   Problem Relation Age of Onset    Diabetes Mother     Hypertension Mother     Heart failure Mother     Kidney disease Sister         ESRD    Diabetes Sister     Diabetes Maternal Grandmother     Cancer Neg Hx      Social History     Socioeconomic History    Marital status:     Number of children: 2   Occupational History    Occupation: retired     Employer: Retired   Tobacco Use    Smoking status: Former     Current packs/day: 0.00     Types: Cigarettes     Quit date: 5/25/2013     Years since quitting: 10.6     Passive exposure: Past    Smokeless tobacco: Former     Quit date: 6/11/2013    Tobacco comments:     used marijuana since [id number], stopped after started dialysis   Substance and Sexual Activity    Alcohol use: No     Alcohol/week: 0.0 standard drinks of alcohol    Drug use: No     Comment:      Sexual activity: Never   Social History Narrative    . Lives with spouse. Has 2 children. Patient retired as  for [employer]. He has been washing cars.     Social Determinants of Health     Financial Resource Strain: Low Risk  (10/2/2020)    Overall Financial Resource Strain (CARDIA)     Difficulty of Paying Living Expenses: Not hard  "at all   Transportation Needs: No Transportation Needs (10/2/2020)    PRAPARE - Transportation     Lack of Transportation (Medical): No     Lack of Transportation (Non-Medical): No   Stress: No Stress Concern Present (10/2/2020)    [place] [place] [employer] - Occupational Stress Questionnaire     Feeling of Stress : Not at all     Medication List with Changes/Refills   Current Medications    AMITRIPTYLINE (ELAVIL) 25 MG TABLET    Take 1 tablet (25 mg total) by mouth nightly as needed for Insomnia.    APIXABAN (ELIQUIS) 5 MG TAB    Take 1 tablet (5 mg total) by mouth 2 (two) times daily.    ASPIRIN (ECOTRIN) 81 MG EC TABLET    Take 1 tablet by mouth Daily.     BD ULTRA-FINE MINI PEN NEEDLE 31 GAUGE X 3/16" NDLE    Use to inject insulin as needed up to 4 times daily    BLOOD SUGAR DIAGNOSTIC (CONTOUR NEXT TEST STRIPS) STRP    by Misc.(Non-Drug; Combo Route) route 4 (four) times daily before meals and nightly.    BLOOD-GLUCOSE METER (FREESTYLE LITE METER) KIT    1 each 4 (four) times daily.    CALCITRIOL (ROCALTROL) 0.25 MCG CAP    Take 1 capsule (0.25 mcg total) by mouth once daily.    COVID [id number],12UP,,ANDU,,PF, (SPIKEVAX [id number],12Y UP,,PF,) 50 MCG/0.5 ML INJECTION    Inject into the muscle.    FAMOTIDINE (PEPCID) 20 MG TABLET    TAKE ONE TABLET BY MOUTH EVERY EVENING    FISH OIL-OMEGA-3 FATTY ACIDS 300-1,000 MG CAPSULE    Take 1 g by mouth once daily.    FUROSEMIDE (LASIX) 80 MG TABLET    Take one-half tablet (40 mg) by mouth 2 (two) times daily.    GABAPENTIN (NEURONTIN) 100 MG CAPSULE    Take 1 capsule (100 mg total) by mouth 3 (three) times daily.    GLIMEPIRIDE (AMARYL) 2 MG TABLET    Take 1 tablet (2 mg total) by mouth before breakfast.    HYDROCODONE-ACETAMINOPHEN (NORCO) 5-325 MG PER TABLET    Take 1 tablet by mouth every 6 (six) hours as needed for Pain.    INSULIN (LANTUS SOLOSTAR U-100 INSULIN) GLARGINE 100 UNITS/ML SUBQ PEN    Inject 35 Units into the skin 2 (two) times daily.    " INSULIN ASPART U-100 (NOVOLOG FLEXPEN U-100 INSULIN) 100 UNIT/ML (3 ML) INPN PEN    Inject 25 Units into the skin 3 (three) times daily with meals.    INSULIN GLARGINE (LANTUS U-100 INSULIN) 100 UNIT/ML INJECTION    Inject 35 Units into the skin 2 (two) times a day.    KETOCONAZOLE (NIZORAL) 200 MG TAB    Take HALF A tablet (100 mg total) by mouth once daily.    LANCETS (MICROLET LANCET) MISC    100 lancets by Misc.(Non-Drug; Combo Route) route 4 (four) times daily before meals and nightly.    MAGNESIUM OXIDE (MAG-OX) 400 MG (241.3 MG MAGNESIUM) TABLET    Take 1 tablet (400 mg total) by mouth once daily.    METOPROLOL SUCCINATE (TOPROL-XL) 25 MG 24 HR TABLET    Take 1 tablet (25 mg total) by mouth 2 (two) times daily.    MONTELUKAST (SINGULAIR) 10 MG TABLET    Take 1 tablet (10 mg total) by mouth every evening.    MULTIVITAMIN (THERAGRAN) TABLET    Take 1 tablet by mouth once daily.    MYCOPHENOLATE (CELLCEPT) 250 MG CAP    Take 2 capsules (500 mg total) by mouth 2 (two) times daily.    ONDANSETRON (ZOFRAN) 4 MG TABLET    Take 1 tablet (4 mg total) by mouth every 6 (six) hours.    POTASSIUM CHLORIDE SA (K-DUR,KLOR-CON) 20 MEQ TABLET    Take 2 tablets (40 mEq total) by mouth once daily.    PREDNISONE (DELTASONE) 5 MG TABLET    Take 1 tablet (5 mg total) by mouth once daily.    PROMETHAZINE-DEXTROMETHORPHAN (PROMETHAZINE-DM) 6.25-15 MG/5 ML SYRP    Take 5 mLs by mouth every 6 (six) hours as needed (cough).    ROSUVASTATIN (CRESTOR) 40 MG TAB    Take 1 tablet (40 mg total) by mouth once daily.    SACUBITRIL-VALSARTAN (ENTRESTO)  MG PER TABLET    Take 1 tablet by mouth 2 (two) times daily.    SEMAGLUTIDE (OZEMPIC) 0.25 MG OR 0.5 MG (2 MG/3 ML) PEN INJECTOR    Inject 0.25 mg into the skin every 7 days. Call office in 6 weeks will increase dose    TACROLIMUS (PROGRAF) 1 MG CAP    Take 5 capsules (5 mg) by mouth every morning and 4 capsules (4 mg) by mouth every evening (9 mg per day total).    TACROLIMUS (PROGRAF) 1  MG CAP    Take 5 mg by mouth every moring and take 4 mg by mouth in the evening    TAMSULOSIN (FLOMAX) 0.4 MG CAP    Take 1 capsule (0.4 mg total) by mouth once daily.    TRAMADOL (ULTRAM) 50 MG TABLET    Take 1 tablet (50 mg total) by mouth every 4 (four) hours as needed for Pain.    TRIAMCINOLONE ACETONIDE 0.1% (KENALOG) 0.1 % OINTMENT    Apply topically 2 (two) times daily. Use on bilateral lower legs.    VALGANCICLOVIR (VALCYTE) 450 MG TAB    Take 1 tablet (450 mg total) by mouth every other day.     Review of patient's allergies indicates:   Allergen Reactions    Lisinopril Other (See Comments)     Other reaction(s):  cough    Actos  [pioglitazone] Other (See Comments)     Other reaction(s): CHF    Metformin Other (See Comments)     Other reaction(s): renal insuff  Other reaction(s): CHF     Review of Systems   Constitutional: Negative for malaise/fatigue.   HENT:  Negative for hearing loss.    Eyes:  Positive for visual disturbance. Negative for double vision.   Cardiovascular:  Positive for chest pain, irregular heartbeat and syncope.   Respiratory:  Positive for sleep disturbances due to breathing. Negative for shortness of breath.    Endocrine: Negative for cold intolerance.   Hematologic/Lymphatic: Does not bruise/bleed easily.   Skin:  Negative for poor wound healing and suspicious lesions.   Musculoskeletal:  Positive for falls and neck pain. Negative for gout, joint pain and joint swelling.   Gastrointestinal:  Positive for diarrhea. Negative for nausea and vomiting.   Genitourinary:  Positive for frequency, hesitancy, incomplete emptying and nocturia. Negative for dysuria.   Neurological:  Positive for dizziness, numbness, paresthesias and sensory change.   Psychiatric/Behavioral:  Negative for depression, memory loss and substance abuse. The patient is not nervous/anxious.    Allergic/Immunologic: Negative for persistent infections.       Objective:   Body mass index is 43.26 kg/m².  There were no  vitals filed for this visit.             General    Constitutional: He is oriented to person, place, and time. He appears well-developed and well-nourished. No distress.   HENT:   Head: Normocephalic.   Eyes: EOM are normal.   Pulmonary/Chest: Effort normal.   Neurological: He is oriented to person, place, and time.   Psychiatric: He has a normal mood and affect.         Left Hand/Wrist Exam     Inspection   Scars: Wrist - absent   Effusion: Wrist - absent     Other     Sensory Exam  Median Distribution: normal  Ulnar Distribution: normal  Radial Distribution: normal      Left Elbow Exam     Inspection   Scars: absent  Effusion: absent    Pain   The patient exhibits pain of the lateral epicondyle    Other   Sensation: normal    Comments:  The patient has tenderness left elbow lateral epicondyle with pain on resisted wrist and finger extension.        Vascular Exam       Capillary Refill  Left Hand: normal capillary refill          Relevant imaging results reviewed and interpreted by me, discussed with the patient and / or family today radiographs left elbow on forearm radiographs 12/25/2022 showed an olecranon osteophyte and radiographic interpretation showed osteoarthritic change in the elbow.  Assessment:     Encounter Diagnosis   Name Primary?    Lateral epicondylitis of left elbow Yes        Plan:     The patient injected left elbow lateral epicondyle with 1 cc Kenalog 1 cc 2% plain lidocaine.  He was given literature on 10 chief yet.  He will return in 3 weeks if not improved                Disclaimer: This note was prepared using a voice recognition system and is likely to have sound alike errors within the text.

## 2024-01-16 NOTE — PROCEDURES
Tendon Origin: L elbow    Date/Time: 1/16/2024 3:15 PM    Performed by: Noel Almonte MD  Authorized by: Noel Almonte MD    Consent Done?:  Yes (Verbal)  Timeout: prior to procedure the correct patient, procedure, and site was verified    Indications:  Pain  Timeout: prior to procedure the correct patient, procedure, and site was verified    Location:  Elbow  Site:  L elbow  Prep: patient was prepped and draped in usual sterile fashion    Ultrasonic Guidance for Needle Placement?: No    Needle size:  25 G  Approach:  Anterolateral  Medications:  40 mg triamcinolone acetonide 40 mg/mL

## 2024-01-29 ENCOUNTER — LAB VISIT (OUTPATIENT)
Dept: LAB | Facility: HOSPITAL | Age: 69
End: 2024-01-29
Attending: INTERNAL MEDICINE
Payer: COMMERCIAL

## 2024-01-29 ENCOUNTER — PATIENT OUTREACH (OUTPATIENT)
Dept: ADMINISTRATIVE | Facility: HOSPITAL | Age: 69
End: 2024-01-29
Payer: COMMERCIAL

## 2024-01-29 ENCOUNTER — TELEPHONE (OUTPATIENT)
Dept: HEMATOLOGY/ONCOLOGY | Facility: CLINIC | Age: 69
End: 2024-01-29
Payer: COMMERCIAL

## 2024-01-29 DIAGNOSIS — D64.9 ANEMIA, UNSPECIFIED TYPE: ICD-10-CM

## 2024-01-29 LAB
ALBUMIN SERPL BCP-MCNC: 3.3 G/DL (ref 3.5–5.2)
ALP SERPL-CCNC: 56 U/L (ref 55–135)
ALT SERPL W/O P-5'-P-CCNC: 30 U/L (ref 10–44)
ANION GAP SERPL CALC-SCNC: 11 MMOL/L (ref 8–16)
AST SERPL-CCNC: 21 U/L (ref 10–40)
BASOPHILS # BLD AUTO: 0.02 K/UL (ref 0–0.2)
BASOPHILS NFR BLD: 0.6 % (ref 0–1.9)
BILIRUB SERPL-MCNC: 0.4 MG/DL (ref 0.1–1)
BUN SERPL-MCNC: 35 MG/DL (ref 8–23)
CALCIUM SERPL-MCNC: 9.5 MG/DL (ref 8.7–10.5)
CHLORIDE SERPL-SCNC: 108 MMOL/L (ref 95–110)
CO2 SERPL-SCNC: 26 MMOL/L (ref 23–29)
CREAT SERPL-MCNC: 1.9 MG/DL (ref 0.5–1.4)
DIFFERENTIAL METHOD BLD: ABNORMAL
EOSINOPHIL # BLD AUTO: 0.1 K/UL (ref 0–0.5)
EOSINOPHIL NFR BLD: 1.4 % (ref 0–8)
ERYTHROCYTE [DISTWIDTH] IN BLOOD BY AUTOMATED COUNT: 13.9 % (ref 11.5–14.5)
EST. GFR  (NO RACE VARIABLE): 37 ML/MIN/1.73 M^2
GLUCOSE SERPL-MCNC: 41 MG/DL (ref 70–110)
HCT VFR BLD AUTO: 38.9 % (ref 40–54)
HGB BLD-MCNC: 11.6 G/DL (ref 14–18)
IMM GRANULOCYTES # BLD AUTO: 0.09 K/UL (ref 0–0.04)
IMM GRANULOCYTES NFR BLD AUTO: 2.6 % (ref 0–0.5)
LDH SERPL L TO P-CCNC: 497 U/L (ref 110–260)
LYMPHOCYTES # BLD AUTO: 1 K/UL (ref 1–4.8)
LYMPHOCYTES NFR BLD: 30.1 % (ref 18–48)
MCH RBC QN AUTO: 26.2 PG (ref 27–31)
MCHC RBC AUTO-ENTMCNC: 29.8 G/DL (ref 32–36)
MCV RBC AUTO: 88 FL (ref 82–98)
MONOCYTES # BLD AUTO: 0.8 K/UL (ref 0.3–1)
MONOCYTES NFR BLD: 22 % (ref 4–15)
NEUTROPHILS # BLD AUTO: 1.5 K/UL (ref 1.8–7.7)
NEUTROPHILS NFR BLD: 43.3 % (ref 38–73)
NRBC BLD-RTO: 0 /100 WBC
PLATELET # BLD AUTO: 188 K/UL (ref 150–450)
PMV BLD AUTO: 11.6 FL (ref 9.2–12.9)
POTASSIUM SERPL-SCNC: 3.9 MMOL/L (ref 3.5–5.1)
PROT SERPL-MCNC: 6.5 G/DL (ref 6–8.4)
RBC # BLD AUTO: 4.43 M/UL (ref 4.6–6.2)
SODIUM SERPL-SCNC: 145 MMOL/L (ref 136–145)
WBC # BLD AUTO: 3.45 K/UL (ref 3.9–12.7)

## 2024-01-29 PROCEDURE — 85025 COMPLETE CBC W/AUTO DIFF WBC: CPT | Performed by: INTERNAL MEDICINE

## 2024-01-29 PROCEDURE — 36415 COLL VENOUS BLD VENIPUNCTURE: CPT | Performed by: INTERNAL MEDICINE

## 2024-01-29 PROCEDURE — 83615 LACTATE (LD) (LDH) ENZYME: CPT | Performed by: INTERNAL MEDICINE

## 2024-01-29 PROCEDURE — 80053 COMPREHEN METABOLIC PANEL: CPT | Performed by: INTERNAL MEDICINE

## 2024-01-31 ENCOUNTER — LAB VISIT (OUTPATIENT)
Dept: LAB | Facility: HOSPITAL | Age: 69
End: 2024-01-31
Attending: INTERNAL MEDICINE
Payer: COMMERCIAL

## 2024-01-31 DIAGNOSIS — Z94.0 KIDNEY REPLACED BY TRANSPLANT: ICD-10-CM

## 2024-01-31 LAB
BASOPHILS # BLD AUTO: 0.01 K/UL (ref 0–0.2)
BASOPHILS NFR BLD: 0.3 % (ref 0–1.9)
DIFFERENTIAL METHOD BLD: ABNORMAL
EOSINOPHIL # BLD AUTO: 0.1 K/UL (ref 0–0.5)
EOSINOPHIL NFR BLD: 2 % (ref 0–8)
ERYTHROCYTE [DISTWIDTH] IN BLOOD BY AUTOMATED COUNT: 14.1 % (ref 11.5–14.5)
HCT VFR BLD AUTO: 40 % (ref 40–54)
HGB BLD-MCNC: 11.5 G/DL (ref 14–18)
IMM GRANULOCYTES # BLD AUTO: 0.12 K/UL (ref 0–0.04)
IMM GRANULOCYTES NFR BLD AUTO: 4 % (ref 0–0.5)
LYMPHOCYTES # BLD AUTO: 0.7 K/UL (ref 1–4.8)
LYMPHOCYTES NFR BLD: 24.4 % (ref 18–48)
MCH RBC QN AUTO: 25.8 PG (ref 27–31)
MCHC RBC AUTO-ENTMCNC: 28.8 G/DL (ref 32–36)
MCV RBC AUTO: 90 FL (ref 82–98)
MONOCYTES # BLD AUTO: 0.6 K/UL (ref 0.3–1)
MONOCYTES NFR BLD: 19.4 % (ref 4–15)
NEUTROPHILS # BLD AUTO: 1.5 K/UL (ref 1.8–7.7)
NEUTROPHILS NFR BLD: 49.9 % (ref 38–73)
NRBC BLD-RTO: 0 /100 WBC
PLATELET # BLD AUTO: 169 K/UL (ref 150–450)
PMV BLD AUTO: 12.5 FL (ref 9.2–12.9)
RBC # BLD AUTO: 4.45 M/UL (ref 4.6–6.2)
WBC # BLD AUTO: 2.99 K/UL (ref 3.9–12.7)

## 2024-01-31 PROCEDURE — 85025 COMPLETE CBC W/AUTO DIFF WBC: CPT | Performed by: INTERNAL MEDICINE

## 2024-01-31 PROCEDURE — 36415 COLL VENOUS BLD VENIPUNCTURE: CPT | Performed by: INTERNAL MEDICINE

## 2024-02-01 ENCOUNTER — TELEPHONE (OUTPATIENT)
Dept: TRANSPLANT | Facility: CLINIC | Age: 69
End: 2024-02-01
Payer: COMMERCIAL

## 2024-02-01 DIAGNOSIS — Z94.0 KIDNEY REPLACED BY TRANSPLANT: Primary | ICD-10-CM

## 2024-02-01 NOTE — TELEPHONE ENCOUNTER
Patient repeated back and voice a understanding of orders and will repeat labs on Tuesday 2/6.  Neutropenic precautions reviewed.  ----- Message from Marci Pan MD sent at 2/1/2024  9:49 AM CST -----  Low WBC: Did he have EBV PCR? If no let  check it next week. His last CMV PCR negative. Please stop valcyte   Hematology consult for leukopenia

## 2024-02-01 NOTE — PROGRESS NOTES
Low WBC: Did he have EBV PCR? If no let  check it next week. His last CMV PCR negative. Please stop valcyte   Hematology consult for leukopenia

## 2024-02-06 ENCOUNTER — LAB VISIT (OUTPATIENT)
Dept: LAB | Facility: HOSPITAL | Age: 69
End: 2024-02-06
Attending: INTERNAL MEDICINE
Payer: COMMERCIAL

## 2024-02-06 DIAGNOSIS — Z94.0 KIDNEY REPLACED BY TRANSPLANT: ICD-10-CM

## 2024-02-06 PROCEDURE — 87799 DETECT AGENT NOS DNA QUANT: CPT | Performed by: INTERNAL MEDICINE

## 2024-02-06 PROCEDURE — 85025 COMPLETE CBC W/AUTO DIFF WBC: CPT | Performed by: INTERNAL MEDICINE

## 2024-02-06 PROCEDURE — 36415 COLL VENOUS BLD VENIPUNCTURE: CPT | Performed by: INTERNAL MEDICINE

## 2024-02-07 LAB
BASOPHILS # BLD AUTO: 0.02 K/UL (ref 0–0.2)
BASOPHILS NFR BLD: 0.7 % (ref 0–1.9)
CMV DNA SPEC QL NAA+PROBE: NORMAL
CYTOMEGALOVIRUS PCR, QUANT: NOT DETECTED IU/ML
DIFFERENTIAL METHOD BLD: ABNORMAL
EOSINOPHIL # BLD AUTO: 0 K/UL (ref 0–0.5)
EOSINOPHIL NFR BLD: 0.7 % (ref 0–8)
EPSTEIN-BARR VIRUS DNA: NORMAL
EPSTEIN-BARR VIRUS PCR, QUANT: NOT DETECTED IU/ML
ERYTHROCYTE [DISTWIDTH] IN BLOOD BY AUTOMATED COUNT: 14.2 % (ref 11.5–14.5)
HCT VFR BLD AUTO: 38.5 % (ref 40–54)
HGB BLD-MCNC: 11.3 G/DL (ref 14–18)
IMM GRANULOCYTES # BLD AUTO: 0.03 K/UL (ref 0–0.04)
IMM GRANULOCYTES NFR BLD AUTO: 1 % (ref 0–0.5)
LYMPHOCYTES # BLD AUTO: 1 K/UL (ref 1–4.8)
LYMPHOCYTES NFR BLD: 32.9 % (ref 18–48)
MCH RBC QN AUTO: 25.8 PG (ref 27–31)
MCHC RBC AUTO-ENTMCNC: 29.4 G/DL (ref 32–36)
MCV RBC AUTO: 88 FL (ref 82–98)
MONOCYTES # BLD AUTO: 0.3 K/UL (ref 0.3–1)
MONOCYTES NFR BLD: 9.3 % (ref 4–15)
NEUTROPHILS # BLD AUTO: 1.7 K/UL (ref 1.8–7.7)
NEUTROPHILS NFR BLD: 55.4 % (ref 38–73)
NRBC BLD-RTO: 0 /100 WBC
PLATELET # BLD AUTO: 137 K/UL (ref 150–450)
PMV BLD AUTO: 11.5 FL (ref 9.2–12.9)
RBC # BLD AUTO: 4.38 M/UL (ref 4.6–6.2)
WBC # BLD AUTO: 3.01 K/UL (ref 3.9–12.7)

## 2024-02-16 RX ORDER — INSULIN GLARGINE 100 [IU]/ML
35 INJECTION, SOLUTION SUBCUTANEOUS 2 TIMES DAILY
Qty: 60 ML | Refills: 0 | Status: SHIPPED | OUTPATIENT
Start: 2024-02-16 | End: 2024-06-09 | Stop reason: SDUPTHER

## 2024-02-16 NOTE — TELEPHONE ENCOUNTER
Care Due:                  Date            Visit Type   Department     Provider  --------------------------------------------------------------------------------                                EP -                              PRIMARY      [facility] INTERNAL  Last Visit: 03-      CARE ([facility])   MARCIO Kowalski                              EP -                              PRIMARY      [facility] INTERNAL  Next Visit: 03-      CARE ([facility])   MARCIO Kowalski                                                            Last  Test          Frequency    Reason                     Performed    Due Date  --------------------------------------------------------------------------------    Mg Level....  12 months..  magnesium................  03- 02-    [facility] Embedded Care Due Messages. Reference number: [id number].   2/16/2024 5:08:55 AM CST

## 2024-02-20 ENCOUNTER — TELEPHONE (OUTPATIENT)
Dept: TRANSPLANT | Facility: CLINIC | Age: 69
End: 2024-02-20
Payer: COMMERCIAL

## 2024-02-20 DIAGNOSIS — I49.3 PVC'S (PREMATURE VENTRICULAR CONTRACTIONS): Primary | ICD-10-CM

## 2024-02-21 ENCOUNTER — TELEPHONE (OUTPATIENT)
Dept: TRANSPLANT | Facility: CLINIC | Age: 69
End: 2024-02-21
Payer: COMMERCIAL

## 2024-02-21 NOTE — TELEPHONE ENCOUNTER
YANELIS from Dr ISMAEL Pan.  Follow up with local Hem/Oc Q 3-4 months for Chronic Leukopenia and Nephrology Q 3-4 months for CKD Management.  2/6/2024 labs reviewed.  ______________________  Patient repeated back and voice a understanding of orders.  Agrees with plan.

## 2024-02-21 NOTE — PROGRESS NOTES
Subjective:   Patient ID:  Mitch Whittaker is a 68 y.o. male who presents for cardiac consult of No chief complaint on file.      The patient came in today for cardiac consult of No chief complaint on file.      Mitch Whittaker is a 68 y.o. male with current medical conditions non obs CAD, h/o COVID 19, DVT on Eliquis, Obesity,  HFrEF 45-50%, CKD, DM2, MARION, HTN, HLD, ESRD s/p renal transplant presents for follow up CV eval.     3/15/19  Pt of Dr. Morris, last seen in clinic 2016. Pt presents after recent admission for CHF at [facility] -   Patient is a 63-year-old gentleman who presented to the hospital with shortness of breath. He was found to have pulmonary edema on chest x-ray and bilateral pedal edema. He was admitted for treatment of acute CHF. Echo showed reduced ejection fraction at 25-30%. He was treated with IV Lasix 80 mg twice daily that has been transitioned to Lasix p.o. Patient is not euvolemic and his creatinine is trending up for this reason I am cutting back on Lasix to 40 mg twice daily. Can follow-up with cardiology in 3 days. He sees one [facility]. He was seen by Dr. Omayra Esparza from [facility]. Recommendation is to start him on Entresto once his renal functions stabilize.Pt had edema and SOB while in bed and then went to hospital. Prior to admission he was on lasix PRN. He will see Dr. Pacheco for nephro eval, transplant nephro Dr. Dejesus.  Has been feeling good on lasix 40 BID, has been walking well overall. Does not have CPAP machine.       12/28/23  Nuc stress neg for ischemia 10/2023, EF 40-45% at rest. ECHO 10/2023 with low normal LV function, normal RV function.   Holter 10/2023 with occ PVCs, freq PACs, AVG HR 93 bpm, neg for Afib.   Had eval with [facility] for preop clearance - told to increase BB, neg stress and ECHO.   BP and HR stable. BMI 43 - 268 lbs. He sugars are worse lately, occ 200s-300s but can also drop a lot.     2/22/24  Started Ozempic last visit. BMI 44 - 277 lbs.   He  has not lost much weight yet. He had low BP once and improved with fluid/chips.   He has low iron will f/u hemeonc.     ECG - sinus tach , V 101, st TWI - inferolat old    Patient has dec exercise tolerance.    Patient is compliant with medications.    Conclusion 10/2023         Predominant Rhythm Sinus rhythm Heart rates varied between 55 and 145 BPM with an average of 93 BPM.    There were occasional PVCs totalling 661 and averaging 13.77 per hour. (0.3%)    There were frequent PACs totalling 4131 and averaging 86.06 per hour. (1.6%)    On review, no evidence of afib observed on the recorded rhythm strips.    Results for orders placed during the hospital encounter of 08/22/23    Echo    Interpretation Summary    Left Ventricle: The left ventricle is normal in size. Moderately increased ventricular mass. Moderately increased wall thickness. Normal wall motion. There is low normal systolic function with a visually estimated ejection fraction of 50 - 55%. There is normal diastolic function.    Left Atrium: Left atrium is severely dilated.    Right Ventricle: Normal right ventricular cavity size. Wall thickness is normal. Right ventricle wall motion  is normal. Systolic function is normal.    Right Atrium: Right atrium is dilated.    IVC/SVC: Normal venous pressure at 3 mmHg.      Results for orders placed during the hospital encounter of 10/10/23    Nuclear Stress - Cardiology Interpreted    Interpretation Summary    Normal myocardial perfusion scan. There is no evidence of myocardial ischemia or infarction.    There is a mild intensity perfusion abnormality in the anteroapical wall of the left ventricle, secondary to breast attenuation.    The gated perfusion images showed an ejection fraction of 41% at rest. The gated perfusion images showed an ejection fraction of 54% post stress.    The ECG portion of the study is uninterpretable due to left bundle branch block.    The patient reported no chest pain during the  stress test.    Pt experienced abdominal discomfort post infusion. No chest pain pre, during or post stress test      LE US 12/2019  Age Indeterminate non occlusive thrombus of the Posterior Tibial Vein.  CONCLUSIONS   Technically difficult study.   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 RNA NEGATIVE 5/2015, 2021, 2022    History of colon polyps     HPTH (hyperparathyroidism)     Hyperlipidemia     Hypertension associated with stage 3 chronic kidney disease due to type 2 diabetes mellitus     LBBB (left bundle branch block) 12/20/2021    Morbid obesity with BMI of 45.0-49.9, adult     Nephrolithiasis 6/7/2013    PCO (posterior capsular opacification), left 03/04/2019    Proteinuria     resolved s/p transplant    S/P kidney transplant     Sleep apnea     Type 2 diabetes, uncontrolled, with retinopathy     Type II diabetes mellitus with renal manifestations        Past Surgical History:   Procedure Laterality Date    CARDIAC CATHETERIZATION  2008    normal coronary    CARPAL TUNNEL RELEASE Right 12/1/2023    Procedure: RELEASE, CARPAL TUNNEL;  Surgeon: Noel Almonte MD;  Location:  "[facility] [place] OR;  Service: Orthopedics;  Laterality: Right;    COLONOSCOPY N/A 4/5/2018    Procedure: COLONOSCOPY;  Surgeon: Chava Ronquillo MD;  Location: [facility] [place] ENDO;  Service: Endoscopy;  Laterality: N/A;    COLONOSCOPY N/A 5/2/2022    Procedure: COLONOSCOPY;  Surgeon: Alix Puente MD;  Location: [facility] [place] ENDO;  Service: Endoscopy;  Laterality: N/A;    COLONOSCOPY N/A 6/7/2023    Procedure: COLONOSCOPY - rule out CMV  Cardiac clearance/Eliquis hold approval received on 05/21/23 per Dr. Meade, cardiology.  Note in encounters.  LB;  Surgeon: Daniella Shah MD;  Location: [facility] [place] ENDO;  Service: Endoscopy;  Laterality: N/A;    KIDNEY TRANSPLANT      RETINAL LASER PROCEDURE         Social History     Tobacco Use    Smoking status: Former     Current packs/day: 0.00     Types: Cigarettes     Quit date: 5/25/2013     Years since quitting: 10.7     Passive exposure: Past    Smokeless tobacco: Former     Quit date: 6/11/2013    Tobacco comments:     used marijuana since [id number], stopped after started dialysis   Substance Use Topics    Alcohol use: No     Alcohol/week: 0.0 standard drinks of alcohol    Drug use: No     Comment:         Family History   Problem Relation Age of Onset    Diabetes Mother     Hypertension Mother     Heart failure Mother     Kidney disease Sister         ESRD    Diabetes Sister     Diabetes Maternal Grandmother     Cancer Neg Hx        Patient's Medications   New Prescriptions    No medications on file   Previous Medications    AMITRIPTYLINE (ELAVIL) 25 MG TABLET    Take 1 tablet (25 mg total) by mouth nightly as needed for Insomnia.    APIXABAN (ELIQUIS) 5 MG TAB    Take 1 tablet (5 mg total) by mouth 2 (two) times daily.    ASPIRIN (ECOTRIN) 81 MG EC TABLET    Take 1 tablet by mouth Daily.     BD ULTRA-FINE MINI PEN NEEDLE 31 GAUGE X 3/16" NDLE    Use to inject insulin as needed up to 4 times daily    BLOOD SUGAR DIAGNOSTIC (CONTOUR NEXT TEST STRIPS) STRP    by Misc.(Non-Drug; Combo Route) route " 4 (four) times daily before meals and nightly.    BLOOD-GLUCOSE METER (FREESTYLE LITE METER) KIT    1 each 4 (four) times daily.    CALCITRIOL (ROCALTROL) 0.25 MCG CAP    Take 1 capsule (0.25 mcg total) by mouth once daily.    CHLORHEXIDINE (PERIDEX) 0.12 % SOLUTION    Rinse with 1/2  ounce (15 mLs)  twice daily    COVID [id number],12UP,,ANDU,,PF, (SPIKEVAX 6615-5497,12Y UP,,PF,) 50 MCG/0.5 ML INJECTION    Inject into the muscle.    FAMOTIDINE (PEPCID) 20 MG TABLET    TAKE ONE TABLET BY MOUTH EVERY EVENING    FISH OIL-OMEGA-3 FATTY ACIDS 300-1,000 MG CAPSULE    Take 1 g by mouth once daily.    FUROSEMIDE (LASIX) 80 MG TABLET    Take one-half tablet (40 mg) by mouth 2 (two) times daily.    GABAPENTIN (NEURONTIN) 100 MG CAPSULE    Take 1 capsule (100 mg total) by mouth 3 (three) times daily.    GLIMEPIRIDE (AMARYL) 2 MG TABLET    Take 1 tablet (2 mg total) by mouth before breakfast.    HYDROCODONE-ACETAMINOPHEN (NORCO) 5-325 MG PER TABLET    Take 1 tablet by mouth every 6 (six) hours as needed for Pain.    INSULIN (LANTUS SOLOSTAR U-100 INSULIN) GLARGINE 100 UNITS/ML SUBQ PEN    Inject 35 Units into the skin 2 (two) times daily.    INSULIN ASPART U-100 (NOVOLOG FLEXPEN U-100 INSULIN) 100 UNIT/ML (3 ML) INPN PEN    Inject 25 Units into the skin 3 (three) times daily with meals.    INSULIN GLARGINE (LANTUS U-100 INSULIN) 100 UNIT/ML INJECTION    Inject 35 Units into the skin 2 (two) times a day.    KETOCONAZOLE (NIZORAL) 200 MG TAB    Take HALF A tablet (100 mg total) by mouth once daily.    LANCETS (MICROLET LANCET) MISC    100 lancets by Misc.(Non-Drug; Combo Route) route 4 (four) times daily before meals and nightly.    MAGNESIUM OXIDE (MAG-OX) 400 MG (241.3 MG MAGNESIUM) TABLET    Take 1 tablet (400 mg total) by mouth once daily.    METOPROLOL SUCCINATE (TOPROL-XL) 25 MG 24 HR TABLET    Take 1 tablet (25 mg total) by mouth 2 (two) times daily.    MONTELUKAST (SINGULAIR) 10 MG TABLET    Take 1 tablet (10 mg total) by  mouth every evening.    MULTIVITAMIN (THERAGRAN) TABLET    Take 1 tablet by mouth once daily.    MYCOPHENOLATE (CELLCEPT) 250 MG CAP    Take 2 capsules (500 mg total) by mouth 2 (two) times daily.    ONDANSETRON (ZOFRAN) 4 MG TABLET    Take 1 tablet (4 mg total) by mouth every 6 (six) hours.    POTASSIUM CHLORIDE SA (K-DUR,KLOR-CON) 20 MEQ TABLET    Take 2 tablets (40 mEq total) by mouth once daily.    PREDNISONE (DELTASONE) 5 MG TABLET    Take 1 tablet (5 mg total) by mouth once daily.    PROMETHAZINE-DEXTROMETHORPHAN (PROMETHAZINE-DM) 6.25-15 MG/5 ML SYRP    Take 5 mLs by mouth every 6 (six) hours as needed (cough).    ROSUVASTATIN (CRESTOR) 40 MG TAB    Take 1 tablet (40 mg total) by mouth once daily.    SACUBITRIL-VALSARTAN (ENTRESTO)  MG PER TABLET    Take 1 tablet by mouth 2 (two) times daily.    SEMAGLUTIDE (OZEMPIC) 0.25 MG OR 0.5 MG (2 MG/3 ML) PEN INJECTOR    Inject 0.25 mg into the skin every 7 days. Call office in 6 weeks will increase dose    TACROLIMUS (PROGRAF) 1 MG CAP    Take 5 capsules (5 mg) by mouth every morning and 4 capsules (4 mg) by mouth every evening (9 mg per day total).    TAMSULOSIN (FLOMAX) 0.4 MG CAP    Take 1 capsule (0.4 mg total) by mouth once daily.    TRAMADOL (ULTRAM) 50 MG TABLET    Take 1 tablet (50 mg total) by mouth every 4 (four) hours as needed for Pain.    TRIAMCINOLONE ACETONIDE 0.1% (KENALOG) 0.1 % OINTMENT    Apply topically 2 (two) times daily. Use on bilateral lower legs.   Modified Medications    No medications on file   Discontinued Medications    No medications on file       Review of Systems   Constitutional:  Positive for malaise/fatigue.   HENT: Negative.     Eyes: Negative.    Respiratory:  Positive for cough and shortness of breath.    Cardiovascular:  Positive for leg swelling. Negative for chest pain and palpitations.   Gastrointestinal: Negative.    Genitourinary: Negative.    Musculoskeletal:  Positive for back pain.   Skin: Negative.   "  Neurological:  Positive for dizziness.   Endo/Heme/Allergies: Negative.    Psychiatric/Behavioral: Negative.     All 12 systems otherwise negative.      Wt Readings from Last 3 Encounters:   02/22/24 125.7 kg (277 lb 1.9 oz)   01/16/24 121.6 kg (268 lb)   12/28/23 121.7 kg (268 lb 4.8 oz)     Temp Readings from Last 3 Encounters:   12/08/23 97.9 °F (36.6 °C) (Temporal)   12/01/23 98 °F (36.7 °C) (Temporal)   11/13/23 97.3 °F (36.3 °C) (Temporal)     BP Readings from Last 3 Encounters:   02/22/24 130/70   12/28/23 108/65   12/08/23 (!) 143/72     Pulse Readings from Last 3 Encounters:   02/22/24 102   12/28/23 85   12/08/23 101       /70   Pulse 102   Ht 5' 6" (1.676 m)   Wt 125.7 kg (277 lb 1.9 oz)   SpO2 98%   BMI 44.73 kg/m²     Objective:   Physical Exam  Vitals and nursing note reviewed.   Constitutional:       General: He is not in acute distress.     Appearance: He is well-developed. He is not diaphoretic.   HENT:      Head: Normocephalic and atraumatic.      Nose: Nose normal.   Eyes:      General: No scleral icterus.     Conjunctiva/sclera: Conjunctivae normal.   Neck:      Thyroid: No thyromegaly.      Vascular: No JVD.   Cardiovascular:      Rate and Rhythm: Normal rate and regular rhythm.      Heart sounds: S1 normal and S2 normal. No murmur heard.     No friction rub. No gallop. No S3 or S4 sounds.   Pulmonary:      Effort: Pulmonary effort is normal. No respiratory distress.      Breath sounds: Normal breath sounds. No stridor. No wheezing or rales.   Chest:      Chest wall: No tenderness.   Abdominal:      General: Bowel sounds are normal. There is no distension.      Palpations: Abdomen is soft. There is no mass.      Tenderness: There is no abdominal tenderness. There is no rebound.   Genitourinary:     Comments: Deferred  Musculoskeletal:         General: No tenderness or deformity. Normal range of motion.      Cervical back: Normal range of motion and neck supple.      Right lower leg: " Edema present.      Left lower leg: Edema present.   Lymphadenopathy:      Cervical: No cervical adenopathy.   Skin:     General: Skin is warm and dry.      Coloration: Skin is not pale.      Findings: No erythema or rash.   Neurological:      Mental Status: He is alert and oriented to person, place, and time.      Motor: No abnormal muscle tone.      Coordination: Coordination normal.   Psychiatric:         Behavior: Behavior normal.         Thought Content: Thought content normal.         Judgment: Judgment normal.         Lab Results   Component Value Date     01/29/2024    K 3.9 01/29/2024     01/29/2024    CO2 26 01/29/2024    BUN 35 (H) 01/29/2024    CREATININE 1.9 (H) 01/29/2024    GLU 41 (LL) 01/29/2024    HGBA1C 8.1 (H) 12/20/2023    MG 2.1 03/03/2022    AST 21 01/29/2024    ALT 30 01/29/2024    ALBUMIN 3.3 (L) 01/29/2024    PROT 6.5 01/29/2024    BILITOT 0.4 01/29/2024    WBC 3.01 (L) 02/06/2024    HGB 11.3 (L) 02/06/2024    HCT 38.5 (L) 02/06/2024    HCT 36 06/12/2015    MCV 88 02/06/2024     (L) 02/06/2024    INR 1.0 06/18/2015    TSH 0.999 03/11/2022    CHOL 201 (H) 12/20/2023    HDL 55 12/20/2023    LDLCALC 128.4 12/20/2023    TRIG 88 12/20/2023     (H) 08/22/2023     Assessment:      1. Chronic combined systolic and diastolic congestive heart failure    2. Morbid obesity with BMI of 40.0-44.9, adult    3. Chronic deep vein thrombosis (DVT) of other vein of left lower extremity    4. Coronary artery disease of native artery of native heart with stable angina pectoris    5. Class 3 severe obesity due to excess calories with serious comorbidity and body mass index (BMI) of 40.0 to 44.9 in adult    6. LBBB (left bundle branch block)    7. PVC's (premature ventricular contractions)    8. Obstructive sleep apnea    9. Chronic anticoagulation    10. Hypertension associated with stage 3 chronic kidney disease due to type 2 diabetes mellitus    11. Stage 3 chronic kidney disease,  unspecified whether stage 3a or 3b CKD    12. History of DVT (deep vein thrombosis)    13. Living-related donor kidney transplant (daughter) - 6/12/15    14. Chronic venous insufficiency of lower extremity    15. Type 2 diabetes mellitus with other specified complication, with long-term current use of insulin    16. Severe obesity (BMI >= 40)    17. Stage 3b chronic kidney disease    18. History of COVID-19    19. Acute on chronic diastolic congestive heart failure              Plan:    1. Chronic CHF EF 40-45% ; neg for TTR amyloidosis  - cont lasix, and extra PRN  - titrate Toprol and Entresto;  - cont low salt diet, fluid restriction  - Nuc stress neg for ischemia 10/2023, EF 40-45% at rest.   - ECHO 10/2023 with low normal LV function, normal RV function.   - Holter 10/2023 with occ PVCs, freq PACs, AVG HR 93 bpm, neg for Afib.     2. HTN with mild edema, PVCs, PACs - occ low BP   - Holter 10/2023 with occ PVCs, freq PACs, AVG HR 93 bpm, neg for Afib.   - cont meds for afterload reduction   - low salt diet  - rec compression stockings  - stop hydralazine   - cont BB    3. HLD  - cont statin    4. ESRD s/p Transplant with CKD  - f/u with nephro, repeat labs and adjust tx per nephro    5. CAD, non obs  - cont meds  - Nuc stress neg for ischemia 10/2023, EF 40-45% at rest.     6. MARION  -cont CPAP    7. Obesity BMI 44 - 285 lbs --> 41 - 267-->  BMI 43 - 268 lbs.   - cont weight loss with diet/exercise     8. H/o DVT, chronic DVT  - cont Eliquis 5 BID  - non occlusive thrombus in PTV in left but no thrombus in POP vein on left.    9. Dizziness, h/o syncope with headaches  - f/u Neuro   - likely sec to Flomax and Proscar, can discuss with urology  - monitor BP as well    10. DM2 - A1c -9.3 --> 8.0 --> 7.5 --> 6.8 --> 7.1 --> 8.1  - titrate meds   - A1c worse, chronic  - cont Ozempic low dose and titrate  - increase to 0.5 mg     11. H/O COVID 19  - long covid sx now  - cont supportive tx    12. FE def  - f/u hemeonc      Visit today included increased complexity associated with the care of the episodic problem CHF addressed and managing the longitudinal care of the patient due to the serious and/or complex managed problem(s) .      Thank you for allowing me to participate in this patient's care. Please do not hesitate to contact me with any questions or concerns. Consult note has been forwarded to the referral physician.

## 2024-02-22 ENCOUNTER — HOSPITAL ENCOUNTER (OUTPATIENT)
Dept: CARDIOLOGY | Facility: HOSPITAL | Age: 69
Discharge: HOME | End: 2024-02-22
Attending: INTERNAL MEDICINE
Payer: COMMERCIAL

## 2024-02-22 ENCOUNTER — OFFICE VISIT (OUTPATIENT)
Dept: CARDIOLOGY | Facility: CLINIC | Age: 69
End: 2024-02-22
Payer: COMMERCIAL

## 2024-02-22 VITALS
SYSTOLIC BLOOD PRESSURE: 130 MMHG | BODY MASS INDEX: 44.54 KG/M2 | HEART RATE: 102 BPM | HEIGHT: 66 IN | WEIGHT: 277.13 LBS | OXYGEN SATURATION: 98 % | DIASTOLIC BLOOD PRESSURE: 70 MMHG

## 2024-02-22 DIAGNOSIS — I44.7 LBBB (LEFT BUNDLE BRANCH BLOCK): ICD-10-CM

## 2024-02-22 DIAGNOSIS — Z79.4 TYPE 2 DIABETES MELLITUS WITH OTHER SPECIFIED COMPLICATION, WITH LONG-TERM CURRENT USE OF INSULIN: ICD-10-CM

## 2024-02-22 DIAGNOSIS — N18.32 STAGE 3B CHRONIC KIDNEY DISEASE: ICD-10-CM

## 2024-02-22 DIAGNOSIS — E66.01 CLASS 3 SEVERE OBESITY DUE TO EXCESS CALORIES WITH SERIOUS COMORBIDITY AND BODY MASS INDEX (BMI) OF 40.0 TO 44.9 IN ADULT: ICD-10-CM

## 2024-02-22 DIAGNOSIS — I49.3 PVC'S (PREMATURE VENTRICULAR CONTRACTIONS): ICD-10-CM

## 2024-02-22 DIAGNOSIS — I25.118 CORONARY ARTERY DISEASE OF NATIVE ARTERY OF NATIVE HEART WITH STABLE ANGINA PECTORIS: ICD-10-CM

## 2024-02-22 DIAGNOSIS — Z86.16 HISTORY OF COVID-19: ICD-10-CM

## 2024-02-22 DIAGNOSIS — Z94.0 KIDNEY TRANSPLANT STATUS, LIVING RELATED DONOR: ICD-10-CM

## 2024-02-22 DIAGNOSIS — E11.69 TYPE 2 DIABETES MELLITUS WITH OTHER SPECIFIED COMPLICATION, WITH LONG-TERM CURRENT USE OF INSULIN: ICD-10-CM

## 2024-02-22 DIAGNOSIS — Z86.718 HISTORY OF DVT (DEEP VEIN THROMBOSIS): ICD-10-CM

## 2024-02-22 DIAGNOSIS — I50.42 CHRONIC COMBINED SYSTOLIC AND DIASTOLIC CONGESTIVE HEART FAILURE: Primary | ICD-10-CM

## 2024-02-22 DIAGNOSIS — E66.01 MORBID OBESITY WITH BMI OF 40.0-44.9, ADULT: ICD-10-CM

## 2024-02-22 DIAGNOSIS — I12.9 HYPERTENSION ASSOCIATED WITH STAGE 3 CHRONIC KIDNEY DISEASE DUE TO TYPE 2 DIABETES MELLITUS: ICD-10-CM

## 2024-02-22 DIAGNOSIS — G47.33 OBSTRUCTIVE SLEEP APNEA: ICD-10-CM

## 2024-02-22 DIAGNOSIS — N18.30 HYPERTENSION ASSOCIATED WITH STAGE 3 CHRONIC KIDNEY DISEASE DUE TO TYPE 2 DIABETES MELLITUS: ICD-10-CM

## 2024-02-22 DIAGNOSIS — E66.01 SEVERE OBESITY (BMI >= 40): ICD-10-CM

## 2024-02-22 DIAGNOSIS — I82.592 CHRONIC DEEP VEIN THROMBOSIS (DVT) OF OTHER VEIN OF LEFT LOWER EXTREMITY: ICD-10-CM

## 2024-02-22 DIAGNOSIS — I50.33 ACUTE ON CHRONIC DIASTOLIC CONGESTIVE HEART FAILURE: ICD-10-CM

## 2024-02-22 DIAGNOSIS — N18.30 STAGE 3 CHRONIC KIDNEY DISEASE, UNSPECIFIED WHETHER STAGE 3A OR 3B CKD: ICD-10-CM

## 2024-02-22 DIAGNOSIS — I87.2 CHRONIC VENOUS INSUFFICIENCY OF LOWER EXTREMITY: ICD-10-CM

## 2024-02-22 DIAGNOSIS — Z79.01 CHRONIC ANTICOAGULATION: ICD-10-CM

## 2024-02-22 DIAGNOSIS — E11.22 HYPERTENSION ASSOCIATED WITH STAGE 3 CHRONIC KIDNEY DISEASE DUE TO TYPE 2 DIABETES MELLITUS: ICD-10-CM

## 2024-02-22 LAB
OHS QRS DURATION: 106 MS
OHS QTC CALCULATION: 466 MS

## 2024-02-22 PROCEDURE — 3078F DIAST BP <80 MM HG: CPT | Mod: CPTII,S$GLB,, | Performed by: INTERNAL MEDICINE

## 2024-02-22 PROCEDURE — 1126F AMNT PAIN NOTED NONE PRSNT: CPT | Mod: CPTII,S$GLB,, | Performed by: INTERNAL MEDICINE

## 2024-02-22 PROCEDURE — 1160F RVW MEDS BY RX/DR IN RCRD: CPT | Mod: CPTII,S$GLB,, | Performed by: INTERNAL MEDICINE

## 2024-02-22 PROCEDURE — 4010F ACE/ARB THERAPY RXD/TAKEN: CPT | Mod: CPTII,S$GLB,, | Performed by: INTERNAL MEDICINE

## 2024-02-22 PROCEDURE — 1159F MED LIST DOCD IN RCRD: CPT | Mod: CPTII,S$GLB,, | Performed by: INTERNAL MEDICINE

## 2024-02-22 PROCEDURE — 93005 ELECTROCARDIOGRAM TRACING: CPT

## 2024-02-22 PROCEDURE — 99214 OFFICE O/P EST MOD 30 MIN: CPT | Mod: S$GLB,,, | Performed by: INTERNAL MEDICINE

## 2024-02-22 PROCEDURE — 93010 ELECTROCARDIOGRAM REPORT: CPT | Mod: ,,, | Performed by: INTERNAL MEDICINE

## 2024-02-22 PROCEDURE — 3008F BODY MASS INDEX DOCD: CPT | Mod: CPTII,S$GLB,, | Performed by: INTERNAL MEDICINE

## 2024-02-22 PROCEDURE — G2211 COMPLEX E/M VISIT ADD ON: HCPCS | Mod: S$GLB,,, | Performed by: INTERNAL MEDICINE

## 2024-02-22 PROCEDURE — 3288F FALL RISK ASSESSMENT DOCD: CPT | Mod: CPTII,S$GLB,, | Performed by: INTERNAL MEDICINE

## 2024-02-22 PROCEDURE — 1101F PT FALLS ASSESS-DOCD LE1/YR: CPT | Mod: CPTII,S$GLB,, | Performed by: INTERNAL MEDICINE

## 2024-02-22 PROCEDURE — 3075F SYST BP GE 130 - 139MM HG: CPT | Mod: CPTII,S$GLB,, | Performed by: INTERNAL MEDICINE

## 2024-02-22 PROCEDURE — 99999 PR PBB SHADOW E&M-EST. PATIENT-LVL V: CPT | Mod: PBBFAC,,, | Performed by: INTERNAL MEDICINE

## 2024-02-22 RX ORDER — SEMAGLUTIDE 0.68 MG/ML
0.5 INJECTION, SOLUTION SUBCUTANEOUS
Qty: 3 ML | Refills: 2 | Status: SHIPPED | OUTPATIENT
Start: 2024-02-22 | End: 2024-05-02

## 2024-02-27 ENCOUNTER — LAB VISIT (OUTPATIENT)
Dept: LAB | Facility: HOSPITAL | Age: 69
End: 2024-02-27
Attending: INTERNAL MEDICINE
Payer: COMMERCIAL

## 2024-02-27 ENCOUNTER — OFFICE VISIT (OUTPATIENT)
Dept: HEMATOLOGY/ONCOLOGY | Facility: CLINIC | Age: 69
End: 2024-02-27
Payer: COMMERCIAL

## 2024-02-27 VITALS
SYSTOLIC BLOOD PRESSURE: 151 MMHG | HEART RATE: 94 BPM | RESPIRATION RATE: 18 BRPM | TEMPERATURE: 98 F | OXYGEN SATURATION: 99 % | DIASTOLIC BLOOD PRESSURE: 82 MMHG | HEIGHT: 66 IN | BODY MASS INDEX: 37.84 KG/M2 | WEIGHT: 235.44 LBS

## 2024-02-27 DIAGNOSIS — Z94.0 KIDNEY REPLACED BY TRANSPLANT: ICD-10-CM

## 2024-02-27 DIAGNOSIS — D63.1 ANEMIA ASSOCIATED WITH CHRONIC RENAL FAILURE: ICD-10-CM

## 2024-02-27 DIAGNOSIS — Z94.0 KIDNEY TRANSPLANT STATUS, LIVING RELATED DONOR: Chronic | ICD-10-CM

## 2024-02-27 DIAGNOSIS — R10.32 LEFT LOWER QUADRANT PAIN: ICD-10-CM

## 2024-02-27 DIAGNOSIS — E66.01 SEVERE OBESITY (BMI >= 40): ICD-10-CM

## 2024-02-27 DIAGNOSIS — N18.9 ANEMIA ASSOCIATED WITH CHRONIC RENAL FAILURE: ICD-10-CM

## 2024-02-27 DIAGNOSIS — E11.8 DM (DIABETES MELLITUS), TYPE 2 WITH COMPLICATIONS: ICD-10-CM

## 2024-02-27 DIAGNOSIS — D72.829 LEUKOCYTOSIS, UNSPECIFIED TYPE: ICD-10-CM

## 2024-02-27 DIAGNOSIS — Z29.89 PROPHYLACTIC IMMUNOTHERAPY: Chronic | ICD-10-CM

## 2024-02-27 DIAGNOSIS — E66.01 MORBID OBESITY WITH BMI OF 40.0-44.9, ADULT: ICD-10-CM

## 2024-02-27 DIAGNOSIS — D61.818 PANCYTOPENIA: Primary | ICD-10-CM

## 2024-02-27 DIAGNOSIS — Z94.0 KIDNEY TRANSPLANTED: ICD-10-CM

## 2024-02-27 DIAGNOSIS — D84.9 IMMUNODEFICIENCY, UNSPECIFIED: ICD-10-CM

## 2024-02-27 LAB — DAT IGG-SP REAG RBC-IMP: NORMAL

## 2024-02-27 PROCEDURE — 87389 HIV-1 AG W/HIV-1&-2 AB AG IA: CPT | Performed by: INTERNAL MEDICINE

## 2024-02-27 PROCEDURE — 80074 ACUTE HEPATITIS PANEL: CPT | Performed by: INTERNAL MEDICINE

## 2024-02-27 PROCEDURE — 1126F AMNT PAIN NOTED NONE PRSNT: CPT | Mod: CPTII,S$GLB,, | Performed by: INTERNAL MEDICINE

## 2024-02-27 PROCEDURE — 36415 COLL VENOUS BLD VENIPUNCTURE: CPT | Performed by: INTERNAL MEDICINE

## 2024-02-27 PROCEDURE — 99999 PR PBB SHADOW E&M-EST. PATIENT-LVL V: CPT | Mod: PBBFAC,,, | Performed by: INTERNAL MEDICINE

## 2024-02-27 PROCEDURE — 86704 HEP B CORE ANTIBODY TOTAL: CPT | Performed by: INTERNAL MEDICINE

## 2024-02-27 PROCEDURE — 86880 COOMBS TEST DIRECT: CPT | Performed by: INTERNAL MEDICINE

## 2024-02-27 PROCEDURE — 83010 ASSAY OF HAPTOGLOBIN QUANT: CPT | Performed by: INTERNAL MEDICINE

## 2024-02-27 PROCEDURE — 3077F SYST BP >= 140 MM HG: CPT | Mod: CPTII,S$GLB,, | Performed by: INTERNAL MEDICINE

## 2024-02-27 PROCEDURE — 1160F RVW MEDS BY RX/DR IN RCRD: CPT | Mod: CPTII,S$GLB,, | Performed by: INTERNAL MEDICINE

## 2024-02-27 PROCEDURE — 1159F MED LIST DOCD IN RCRD: CPT | Mod: CPTII,S$GLB,, | Performed by: INTERNAL MEDICINE

## 2024-02-27 PROCEDURE — 4010F ACE/ARB THERAPY RXD/TAKEN: CPT | Mod: CPTII,S$GLB,, | Performed by: INTERNAL MEDICINE

## 2024-02-27 PROCEDURE — 3288F FALL RISK ASSESSMENT DOCD: CPT | Mod: CPTII,S$GLB,, | Performed by: INTERNAL MEDICINE

## 2024-02-27 PROCEDURE — 3079F DIAST BP 80-89 MM HG: CPT | Mod: CPTII,S$GLB,, | Performed by: INTERNAL MEDICINE

## 2024-02-27 PROCEDURE — 99214 OFFICE O/P EST MOD 30 MIN: CPT | Mod: S$GLB,,, | Performed by: INTERNAL MEDICINE

## 2024-02-27 PROCEDURE — 1101F PT FALLS ASSESS-DOCD LE1/YR: CPT | Mod: CPTII,S$GLB,, | Performed by: INTERNAL MEDICINE

## 2024-02-27 PROCEDURE — 3008F BODY MASS INDEX DOCD: CPT | Mod: CPTII,S$GLB,, | Performed by: INTERNAL MEDICINE

## 2024-02-27 RX ORDER — FERROUS SULFATE 325(65) MG
325 TABLET ORAL
Qty: 30 TABLET | Refills: 11 | Status: SHIPPED | OUTPATIENT
Start: 2024-02-27 | End: 2024-02-27

## 2024-02-27 RX ORDER — FERROUS SULFATE 325(65) MG
325 TABLET ORAL
Qty: 30 TABLET | Refills: 11 | Status: SHIPPED | OUTPATIENT
Start: 2024-02-27

## 2024-02-27 NOTE — PROGRESS NOTES
Subjective:       Patient ID: Mitch Whittaker is a 68 y.o. male.    Chief Complaint: Results, Anemia, and Chronic Renal Failure    HPI:  68-year-old male status post kidney transplant patient is referred for evaluation of pancytopenia.  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 RNA NEGATIVE 5/2015, 2021, 2022    History of colon polyps     HPTH (hyperparathyroidism)     Hyperlipidemia     Hypertension associated with stage 3 chronic kidney disease due to type 2 diabetes mellitus     LBBB (left bundle branch block) 12/20/2021    Morbid obesity with BMI of 45.0-49.9, adult     Nephrolithiasis 6/7/2013    PCO (posterior capsular opacification), left 03/04/2019    Proteinuria     resolved s/p transplant    S/P kidney transplant     Sleep apnea     Type 2 diabetes, uncontrolled, with retinopathy     Type II diabetes mellitus with renal manifestations      Family History   Problem Relation Age of Onset    Diabetes Mother     Hypertension Mother     Heart failure Mother     Kidney disease Sister         ESRD    Diabetes Sister     Diabetes Maternal Grandmother     Cancer Neg Hx      Social History     Socioeconomic History    Marital status:     Number of children: 2    Occupational History    Occupation: retired     Employer: Retired   Tobacco Use    Smoking status: Former     Current packs/day: 0.00     Types: Cigarettes     Quit date: 5/25/2013     Years since quitting: 10.7     Passive exposure: Past    Smokeless tobacco: Former     Quit date: 6/11/2013    Tobacco comments:     used marijuana since [id number], stopped after started dialysis   Substance and Sexual Activity    Alcohol use: No     Alcohol/week: 0.0 standard drinks of alcohol    Drug use: No     Comment:      Sexual activity: Never   Social History Narrative    . Lives with spouse. Has 2 children. Patient retired as  for [employer]. He has been washing cars.     Social Determinants of Health     Financial Resource Strain: Low Risk  (10/2/2020)    Overall Financial Resource Strain (CARDIA)     Difficulty of Paying Living Expenses: Not hard at all   Transportation Needs: No Transportation Needs (10/2/2020)    PRAPARE - Transportation     Lack of Transportation (Medical): No     Lack of Transportation (Non-Medical): No   Stress: No Stress Concern Present (10/2/2020)    [place] [employer] - Occupational Stress Questionnaire     Feeling of Stress : Not at all     Past Surgical History:   Procedure Laterality Date    CARDIAC CATHETERIZATION  2008    normal coronary    CARPAL TUNNEL RELEASE Right 12/1/2023    Procedure: RELEASE, CARPAL TUNNEL;  Surgeon: Noel Almonte MD;  Location: [facility] OR;  Service: Orthopedics;  Laterality: Right;    COLONOSCOPY N/A 4/5/2018    Procedure: COLONOSCOPY;  Surgeon: Chava Ronquillo MD;  Location: [facility] ENDO;  Service: Endoscopy;  Laterality: N/A;    COLONOSCOPY N/A 5/2/2022    Procedure: COLONOSCOPY;  Surgeon: Alix Puente MD;  Location: [facility] ENDO;  Service: Endoscopy;  Laterality: N/A;    COLONOSCOPY N/A 6/7/2023    Procedure: COLONOSCOPY - rule out CMV  Cardiac clearance/Eliquis hold approval received on 05/21/23 per   Deshaun, cardiology.  Note in encounters.  LB;  Surgeon: Daniella Shah MD;  Location: [facility];  Service: Endoscopy;  Laterality: N/A;    KIDNEY TRANSPLANT      RETINAL LASER PROCEDURE         Labs:  Lab Results   Component Value Date    WBC 3.01 (L) 02/06/2024    HGB 11.3 (L) 02/06/2024    HCT 38.5 (L) 02/06/2024    MCV 88 02/06/2024     (L) 02/06/2024     BMP  Lab Results   Component Value Date     01/29/2024    K 3.9 01/29/2024     01/29/2024    CO2 26 01/29/2024    BUN 35 (H) 01/29/2024    CREATININE 1.9 (H) 01/29/2024    CALCIUM 9.5 01/29/2024    ANIONGAP 11 01/29/2024    ESTGFRAFRICA 22.7 (A) 07/06/2022    EGFRNONAA 19.6 (A) 07/06/2022     Lab Results   Component Value Date    ALT 30 01/29/2024    AST 21 01/29/2024    ALKPHOS 56 01/29/2024    BILITOT 0.4 01/29/2024       Lab Results   Component Value Date    IRON 16 (L) 12/08/2023    TIBC 218 (L) 12/08/2023    FERRITIN 821 (H) 12/08/2023     Lab Results   Component Value Date    GXJOLBQG00 >2000 (H) 11/17/2023     Lab Results   Component Value Date    FOLATE 14.2 11/17/2023     Lab Results   Component Value Date    TSH 0.999 03/11/2022         Review of Systems   Constitutional:  Positive for fatigue. Negative for activity change, appetite change, chills, diaphoresis, fever and unexpected weight change.   HENT:  Negative for congestion, dental problem, drooling, ear discharge, ear pain, facial swelling, hearing loss, mouth sores, nosebleeds, postnasal drip, rhinorrhea, sinus pressure, sneezing, sore throat, tinnitus, trouble swallowing and voice change.    Eyes:  Negative for photophobia, pain, discharge, redness, itching and visual disturbance.   Respiratory:  Negative for apnea, cough, choking, chest tightness, shortness of breath, wheezing and stridor.    Cardiovascular:  Negative for chest pain, palpitations and leg swelling.   Gastrointestinal:  Negative for abdominal distention, abdominal pain, anal bleeding, blood in stool,  constipation, diarrhea, nausea, rectal pain and vomiting.   Endocrine: Negative for cold intolerance, heat intolerance, polydipsia, polyphagia and polyuria.   Genitourinary:  Negative for decreased urine volume, difficulty urinating, dysuria, enuresis, flank pain, frequency, genital sores, hematuria, penile discharge, penile pain, penile swelling, scrotal swelling, testicular pain and urgency.   Musculoskeletal:  Negative for arthralgias, back pain, gait problem, joint swelling, myalgias, neck pain and neck stiffness.   Skin:  Negative for color change, pallor, rash and wound.   Allergic/Immunologic: Negative for environmental allergies, food allergies and immunocompromised state.   Neurological:  Positive for weakness. Negative for dizziness, tremors, seizures, syncope, facial asymmetry, speech difficulty, light-headedness, numbness and headaches.   Hematological:  Negative for adenopathy. Does not bruise/bleed easily.   Psychiatric/Behavioral:  Negative for agitation, behavioral problems, confusion, decreased concentration, dysphoric mood, hallucinations, self-injury, sleep disturbance and suicidal ideas. The patient is not nervous/anxious and is not hyperactive.        Objective:      Physical Exam  Vitals reviewed.   Constitutional:       General: He is not in acute distress.     Appearance: Normal appearance. He is well-developed. He is obese. He is not diaphoretic.   HENT:      Head: Normocephalic.      Right Ear: External ear normal.      Left Ear: External ear normal.      Nose: Nose normal.      Right Sinus: No maxillary sinus tenderness or frontal sinus tenderness.      Left Sinus: No maxillary sinus tenderness or frontal sinus tenderness.      Mouth/Throat:      Pharynx: No oropharyngeal exudate.   Eyes:      General: Lids are normal. No scleral icterus.        Right eye: No discharge.         Left eye: No discharge.      Extraocular Movements:      Right eye: Normal extraocular motion.      Left eye:  Normal extraocular motion.      Conjunctiva/sclera:      Right eye: Right conjunctiva is not injected. No hemorrhage.     Left eye: Left conjunctiva is not injected. No hemorrhage.     Pupils: Pupils are equal, round, and reactive to light.   Neck:      Thyroid: No thyromegaly.      Vascular: No JVD.      Trachea: No tracheal deviation.   Cardiovascular:      Rate and Rhythm: Normal rate.   Pulmonary:      Effort: Pulmonary effort is normal. No respiratory distress.      Breath sounds: No stridor.   Abdominal:      General: Bowel sounds are normal.      Palpations: Abdomen is soft. There is no hepatomegaly, splenomegaly or mass.      Tenderness: There is no abdominal tenderness.   Musculoskeletal:         General: No tenderness. Normal range of motion.      Cervical back: Normal range of motion and neck supple.   Lymphadenopathy:      Head:      Right side of head: No posterior auricular or occipital adenopathy.      Left side of head: No posterior auricular or occipital adenopathy.      Cervical: No cervical adenopathy.      Right cervical: No superficial, deep or posterior cervical adenopathy.     Left cervical: No superficial, deep or posterior cervical adenopathy.      Upper Body:      Right upper body: No supraclavicular adenopathy.      Left upper body: No supraclavicular adenopathy.   Skin:     General: Skin is dry.      Findings: No erythema or rash.      Nails: There is no clubbing.   Neurological:      Mental Status: He is alert and oriented to person, place, and time.      Cranial Nerves: No cranial nerve deficit.      Coordination: Coordination normal.   Psychiatric:         Behavior: Behavior normal.         Thought Content: Thought content normal.         Judgment: Judgment normal.             Assessment:      1. Pancytopenia    2. Kidney replaced by transplant    3. Anemia associated with chronic renal failure    4. Leukocytosis, unspecified type    5. Kidney transplanted    6. Living-related donor  kidney transplant (daughter) - 6/12/15    7. Chronic immunosuppression with Prograf, MMF and prednisone    8. Left lower quadrant pain    9. Severe obesity (BMI >= 40)    10. DM (diabetes mellitus), type 2 with complications    11. Morbid obesity with BMI of 40.0-44.9, adult    12. Immunodeficiency, unspecified           Med Onc Chart Routing      Follow up with physician . Return to clinic after CT chest abdomen pelvis without IV contrast   Follow up with TANYA    Infusion scheduling note    Injection scheduling note    Labs   Scheduling:  Preferred lab:  Lab interval:  Laboratory studies drawn today communicate through portal   Imaging    Pharmacy appointment    Other referrals                   Plan:     Multiple medical problems can be contributing to pancytopenia high likelihood has hypersplenism.  Will proceed with CT chest abdomen pelvis without IV contrast because of renal insufficiency anemia probably mitigated by iron deficiency as well as chronic renal failure recent colonoscopies but does need EGD to be documentation.  For upper tract.  Discussed implications and will see patient back after imaging completed discussed above      Da Bojorquez Jr, MD FACP

## 2024-02-28 LAB
HAPTOGLOB SERPL-MCNC: 274 MG/DL (ref 30–250)
HAV IGM SERPL QL IA: ABNORMAL
HBV CORE AB SERPL QL IA: NORMAL
HBV CORE IGM SERPL QL IA: ABNORMAL
HBV SURFACE AG SERPL QL IA: ABNORMAL
HCV AB SERPL QL IA: REACTIVE
HIV 1+2 AB+HIV1 P24 AG SERPL QL IA: NORMAL

## 2024-02-29 ENCOUNTER — PATIENT OUTREACH (OUTPATIENT)
Dept: ADMINISTRATIVE | Facility: HOSPITAL | Age: 69
End: 2024-02-29
Payer: COMMERCIAL

## 2024-02-29 ENCOUNTER — HOSPITAL ENCOUNTER (OUTPATIENT)
Dept: PREADMISSION TESTING | Facility: HOSPITAL | Age: 69
Discharge: HOME | End: 2024-02-29
Attending: INTERNAL MEDICINE
Payer: COMMERCIAL

## 2024-02-29 DIAGNOSIS — Z94.0 KIDNEY REPLACED BY TRANSPLANT: ICD-10-CM

## 2024-02-29 DIAGNOSIS — N18.9 ANEMIA ASSOCIATED WITH CHRONIC RENAL FAILURE: Primary | ICD-10-CM

## 2024-02-29 DIAGNOSIS — D72.829 LEUKOCYTOSIS, UNSPECIFIED TYPE: ICD-10-CM

## 2024-02-29 DIAGNOSIS — D63.1 ANEMIA ASSOCIATED WITH CHRONIC RENAL FAILURE: Primary | ICD-10-CM

## 2024-02-29 NOTE — PROGRESS NOTES
Care Coordination Encounter Details:       MyChart Portal Status:         []  Reviewed MyChart Portal Status offered / enrolled if applicable        Additional Notes:     MyChart Outcomes: Pt is enrolled & active          Updates Requested / Reviewed:        Care Everywhere, , Care Team Updated, and Immunizations Reconciliation Completed or Queried: [place]         Health Maintenance Screening(s) Due:      Health Maintenance Topics Overdue:      VBHM Score: 2     Foot Exam  Uncontrolled BP                Health Maintenance Topic(s) Outreach Outcomes & Actions Taken:    Diabetic Foot Exam - Outreach Outcomes & Actions Taken  : PCP appt 3/14/24, appt notes updated           Additional Notes:  Appt today with Endo  to discuss scheduling colonoscopy           Chronic Disease Management:     Diabetes Measures        Lab Results   Component Value Date    HGBA1C 8.1 (H) 12/20/2023           [x]  Reviewed chart for active Diabetes diagnosis     [x]  Scheduled necessary follow up appointments if needed         Additional Notes:  Hemoglobin A1c due 3/20/24, lab appointment scheduled on 3/14/24, same day as PCP appt           Hypertension Measures        BP Readings from Last 1 Encounters:   02/27/24 (!) 151/82           [x]  Reviewed chart for active Hypertension diagnosis     []  Reviewed & documented Home BP Cuff     []  Documented a Remote BP if needed & applicable     []  Scheduled necessary follow up appointments with Primary Care if needed         Additional Notes:  PCP appt is scheduled on 3/14/24           Provider Team Continuity:     Last PCP Visit Date: 3/28/2023          [x]  Reviewed Primary Care Provider Visits, Annual Wellness Visit, and Future          Appointments to ensure appointments have been scheduled and/or           completed        Additional Notes:  Next PCP appt is 3/14/24, no AWV noted           Social Determinants of Health          []  Reviewed, completed, and/or updated  the following sections:                  Food Insecurity, Transportation Needs, Financial Resource Strain,                 Tobacco Use        Additional Notes:             Care Management, Digital Medicine, and/or Education Referrals    OPCM Risk Score: 76.5         Next Steps - Referral Actions: no referrals placed        Additional Notes:

## 2024-03-01 ENCOUNTER — E-CONSULT (OUTPATIENT)
Dept: CARDIOLOGY | Facility: CLINIC | Age: 69
End: 2024-03-01
Payer: COMMERCIAL

## 2024-03-01 DIAGNOSIS — Z01.810 PREOP CARDIOVASCULAR EXAM: Primary | ICD-10-CM

## 2024-03-01 PROCEDURE — 99451 NTRPROF PH1/NTRNET/EHR 5/>: CPT | Mod: S$GLB,,, | Performed by: INTERNAL MEDICINE

## 2024-03-01 NOTE — CONSULTS
[facility] [address]  Response for E-Consult     Patient Name: Mitch Whittaker  MRN: [id number]  Primary Care Provider: Alix Kowalski DO   Requesting Provider: Bozena Laughlin NP  E-Consult to Cardiology  Consult performed by: Paddy Meade MD  Consult ordered by: Bozena Laughlin NP            69 yo male, E consult for preop clearance of endoscopy  The chart reviewed.  PMH CAD, h/o COVID 19, DVT on Eliquis, Obesity, HFrEF 45-50%, CKD, DM2, MARION, HTN, HLD, ESRD s/p renal transplant   GFR 37    Plan  Elevated periop risk of CV events for non-high risk procedure.  Ok to proceed the scheduled procedure without further cardiac study.  OK to hold eliquis 3 days before the procedure and resume postop once hemodynamically stable      Total time of Consultation: 10 minute    I did not speak to the requesting provider verbally about this.     *This eConsult is based on the clinical data available to me and is furnished without benefit of a physical examination. The eConsult will need to be interpreted in light of any clinical issues or changes in patient status not available to me at the time of filing this eConsults. Significant changes in patient condition or level of acuity should result in immediate formal consultation and reevaluation. Please alert me if you have further questions.    Thank you for this eConsult referral.     Paddy Meade MD  [facility] [address]

## 2024-03-03 NOTE — TELEPHONE ENCOUNTER
No care due was identified.  Health [facility] Embedded Care Due Messages. Reference number: [id number].   3/03/2024 5:08:56 AM CST

## 2024-03-04 RX ORDER — GLIMEPIRIDE 2 MG/1
2 TABLET ORAL
Qty: 90 TABLET | Refills: 0 | Status: SHIPPED | OUTPATIENT
Start: 2024-03-04 | End: 2024-06-05 | Stop reason: SDUPTHER

## 2024-03-04 NOTE — TELEPHONE ENCOUNTER
Refill Routing Note   Medication(s) are not appropriate for processing by [facility] for the following reason(s):        Required labs abnormal  ED/Hospital Visit since last OV with provider    [facility] action(s):  Route               Appointments  past 12m or future 3m with PCP    Date Provider   Last Visit   3/28/2023 Alix Kowalski DO   Next Visit   3/14/2024 Alix Kowalski DO   ED visits in past 90 days: 0        Note composed:11:04 AM 03/04/2024

## 2024-03-14 ENCOUNTER — HOSPITAL ENCOUNTER (OUTPATIENT)
Dept: RADIOLOGY | Facility: HOSPITAL | Age: 69
Discharge: HOME | End: 2024-03-14
Attending: INTERNAL MEDICINE
Payer: COMMERCIAL

## 2024-03-14 DIAGNOSIS — N18.9 ANEMIA ASSOCIATED WITH CHRONIC RENAL FAILURE: ICD-10-CM

## 2024-03-14 DIAGNOSIS — D63.1 ANEMIA ASSOCIATED WITH CHRONIC RENAL FAILURE: ICD-10-CM

## 2024-03-14 DIAGNOSIS — R10.32 LEFT LOWER QUADRANT PAIN: ICD-10-CM

## 2024-03-14 PROCEDURE — 71250 CT THORAX DX C-: CPT | Mod: TC

## 2024-03-14 PROCEDURE — 25500020 PHARM REV CODE 255: Performed by: INTERNAL MEDICINE

## 2024-03-14 PROCEDURE — 71250 CT THORAX DX C-: CPT | Mod: 26,,, | Performed by: RADIOLOGY

## 2024-03-14 PROCEDURE — 74176 CT ABD & PELVIS W/O CONTRAST: CPT | Mod: TC

## 2024-03-14 PROCEDURE — 74176 CT ABD & PELVIS W/O CONTRAST: CPT | Mod: 26,,, | Performed by: RADIOLOGY

## 2024-03-14 PROCEDURE — A9698 NON-RAD CONTRAST MATERIALNOC: HCPCS | Performed by: INTERNAL MEDICINE

## 2024-03-14 RX ADMIN — IOHEXOL 1000 ML: 12 SOLUTION ORAL at 05:03

## 2024-03-18 ENCOUNTER — OFFICE VISIT (OUTPATIENT)
Dept: HEMATOLOGY/ONCOLOGY | Facility: CLINIC | Age: 69
End: 2024-03-18
Payer: COMMERCIAL

## 2024-03-18 ENCOUNTER — LAB VISIT (OUTPATIENT)
Dept: LAB | Facility: HOSPITAL | Age: 69
End: 2024-03-18
Attending: INTERNAL MEDICINE
Payer: COMMERCIAL

## 2024-03-18 VITALS
BODY MASS INDEX: 44.8 KG/M2 | HEIGHT: 66 IN | WEIGHT: 278.75 LBS | SYSTOLIC BLOOD PRESSURE: 147 MMHG | HEART RATE: 96 BPM | TEMPERATURE: 98 F | DIASTOLIC BLOOD PRESSURE: 78 MMHG | OXYGEN SATURATION: 99 %

## 2024-03-18 DIAGNOSIS — Z94.0 KIDNEY REPLACED BY TRANSPLANT: ICD-10-CM

## 2024-03-18 DIAGNOSIS — D63.1 ANEMIA ASSOCIATED WITH CHRONIC RENAL FAILURE: ICD-10-CM

## 2024-03-18 DIAGNOSIS — Z94.0 KIDNEY REPLACED BY TRANSPLANT: Primary | ICD-10-CM

## 2024-03-18 DIAGNOSIS — Z94.0 KIDNEY TRANSPLANT STATUS, LIVING RELATED DONOR: Chronic | ICD-10-CM

## 2024-03-18 DIAGNOSIS — R91.1 SOLITARY PULMONARY NODULE: ICD-10-CM

## 2024-03-18 DIAGNOSIS — Z79.01 CHRONIC ANTICOAGULATION: ICD-10-CM

## 2024-03-18 DIAGNOSIS — N18.9 ANEMIA ASSOCIATED WITH CHRONIC RENAL FAILURE: ICD-10-CM

## 2024-03-18 DIAGNOSIS — I82.492 ACUTE DEEP VEIN THROMBOSIS (DVT) OF OTHER SPECIFIED VEIN OF LEFT LOWER EXTREMITY: ICD-10-CM

## 2024-03-18 PROCEDURE — 3008F BODY MASS INDEX DOCD: CPT | Mod: CPTII,S$GLB,, | Performed by: INTERNAL MEDICINE

## 2024-03-18 PROCEDURE — 1125F AMNT PAIN NOTED PAIN PRSNT: CPT | Mod: CPTII,S$GLB,, | Performed by: INTERNAL MEDICINE

## 2024-03-18 PROCEDURE — 99999 PR PBB SHADOW E&M-EST. PATIENT-LVL V: CPT | Mod: PBBFAC,,, | Performed by: INTERNAL MEDICINE

## 2024-03-18 PROCEDURE — 1159F MED LIST DOCD IN RCRD: CPT | Mod: CPTII,S$GLB,, | Performed by: INTERNAL MEDICINE

## 2024-03-18 PROCEDURE — 1101F PT FALLS ASSESS-DOCD LE1/YR: CPT | Mod: CPTII,S$GLB,, | Performed by: INTERNAL MEDICINE

## 2024-03-18 PROCEDURE — 3288F FALL RISK ASSESSMENT DOCD: CPT | Mod: CPTII,S$GLB,, | Performed by: INTERNAL MEDICINE

## 2024-03-18 PROCEDURE — 3078F DIAST BP <80 MM HG: CPT | Mod: CPTII,S$GLB,, | Performed by: INTERNAL MEDICINE

## 2024-03-18 PROCEDURE — 3051F HG A1C>EQUAL 7.0%<8.0%: CPT | Mod: CPTII,S$GLB,, | Performed by: INTERNAL MEDICINE

## 2024-03-18 PROCEDURE — 3077F SYST BP >= 140 MM HG: CPT | Mod: CPTII,S$GLB,, | Performed by: INTERNAL MEDICINE

## 2024-03-18 PROCEDURE — 1160F RVW MEDS BY RX/DR IN RCRD: CPT | Mod: CPTII,S$GLB,, | Performed by: INTERNAL MEDICINE

## 2024-03-18 PROCEDURE — 4010F ACE/ARB THERAPY RXD/TAKEN: CPT | Mod: CPTII,S$GLB,, | Performed by: INTERNAL MEDICINE

## 2024-03-18 PROCEDURE — 81003 URINALYSIS AUTO W/O SCOPE: CPT | Performed by: INTERNAL MEDICINE

## 2024-03-18 PROCEDURE — 99214 OFFICE O/P EST MOD 30 MIN: CPT | Mod: S$GLB,,, | Performed by: INTERNAL MEDICINE

## 2024-03-18 NOTE — PROGRESS NOTES
Subjective:       Patient ID: Mitch Whittaker is a 68 y.o. male.    Chief Complaint: Results    HPI:  68-year-old male history of kidney transplant patient returns for follow-up to review his CT chest abdomen pelvis without IV contrast.  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 RNA NEGATIVE 5/2015, 2021, 2022    History of colon polyps     HPTH (hyperparathyroidism)     Hyperlipidemia     Hypertension associated with stage 3 chronic kidney disease due to type 2 diabetes mellitus     LBBB (left bundle branch block) 12/20/2021    Morbid obesity with BMI of 45.0-49.9, adult     Nephrolithiasis 6/7/2013    PCO (posterior capsular opacification), left 03/04/2019    Proteinuria     resolved s/p transplant    S/P kidney transplant     Sleep apnea     Type 2 diabetes, uncontrolled, with retinopathy     Type II diabetes mellitus with renal manifestations      Family History   Problem Relation Age of Onset    Diabetes Mother     Hypertension Mother     Heart failure Mother     Kidney disease Sister         ESRD    Diabetes Sister     Diabetes Maternal Grandmother     Cancer Neg Hx      Social History     Socioeconomic History    Marital status:     Number of  children: 2   Occupational History    Occupation: retired     Employer: Retired   Tobacco Use    Smoking status: Former     Current packs/day: 0.00     Types: Cigarettes     Quit date: 5/25/2013     Years since quitting: 10.8     Passive exposure: Past    Smokeless tobacco: Former     Quit date: 6/11/2013    Tobacco comments:     used marijuana since 5743-7912, stopped after started dialysis   Substance and Sexual Activity    Alcohol use: No     Alcohol/week: 0.0 standard drinks of alcohol    Drug use: No     Comment:      Sexual activity: Never   Social History Narrative    . Lives with spouse. Has 2 children. Patient retired as  for [employer]. He has been washing cars.     Social Determinants of Health     Financial Resource Strain: Low Risk  (10/2/2020)    Overall Financial Resource Strain (CARDIA)     Difficulty of Paying Living Expenses: Not hard at all   Transportation Needs: No Transportation Needs (10/2/2020)    PRAPARE - Transportation     Lack of Transportation (Medical): No     Lack of Transportation (Non-Medical): No   Stress: No Stress Concern Present (10/2/2020)    [place] [employer] - Occupational Stress Questionnaire     Feeling of Stress : Not at all     Past Surgical History:   Procedure Laterality Date    CARDIAC CATHETERIZATION  2008    normal coronary    CARPAL TUNNEL RELEASE Right 12/1/2023    Procedure: RELEASE, CARPAL TUNNEL;  Surgeon: Noel Almonte MD;  Location: [facility] OR;  Service: Orthopedics;  Laterality: Right;    COLONOSCOPY N/A 4/5/2018    Procedure: COLONOSCOPY;  Surgeon: Chava Ronquillo MD;  Location: [facility] ENDO;  Service: Endoscopy;  Laterality: N/A;    COLONOSCOPY N/A 5/2/2022    Procedure: COLONOSCOPY;  Surgeon: Alix Puente MD;  Location: [facility] ENDO;  Service: Endoscopy;  Laterality: N/A;    COLONOSCOPY N/A 6/7/2023    Procedure: COLONOSCOPY - rule out CMV  Cardiac clearance/Eliquis hold approval received on 05/21/23  per Dr. Meade, cardiology.  Note in encounters.  LB;  Surgeon: Daniella Shah MD;  Location: [facility];  Service: Endoscopy;  Laterality: N/A;    KIDNEY TRANSPLANT      RETINAL LASER PROCEDURE         Labs:  Lab Results   Component Value Date    WBC 3.01 (L) 02/06/2024    HGB 11.3 (L) 02/06/2024    HCT 38.5 (L) 02/06/2024    MCV 88 02/06/2024     (L) 02/06/2024     BMP  Lab Results   Component Value Date     01/29/2024    K 3.9 01/29/2024     01/29/2024    CO2 26 01/29/2024    BUN 35 (H) 01/29/2024    CREATININE 1.9 (H) 01/29/2024    CALCIUM 9.5 01/29/2024    ANIONGAP 11 01/29/2024    ESTGFRAFRICA 22.7 (A) 07/06/2022    EGFRNONAA 19.6 (A) 07/06/2022     Lab Results   Component Value Date    ALT 30 01/29/2024    AST 21 01/29/2024    ALKPHOS 56 01/29/2024    BILITOT 0.4 01/29/2024       Lab Results   Component Value Date    IRON 16 (L) 12/08/2023    TIBC 218 (L) 12/08/2023    FERRITIN 821 (H) 12/08/2023     Lab Results   Component Value Date    RZOANKZC09 >2000 (H) 11/17/2023     Lab Results   Component Value Date    FOLATE 14.2 11/17/2023     Lab Results   Component Value Date    TSH 0.999 03/11/2022         Review of Systems   Constitutional:  Negative for activity change, appetite change, chills, diaphoresis, fatigue, fever and unexpected weight change.   HENT:  Negative for congestion, dental problem, drooling, ear discharge, ear pain, facial swelling, hearing loss, mouth sores, nosebleeds, postnasal drip, rhinorrhea, sinus pressure, sneezing, sore throat, tinnitus, trouble swallowing and voice change.    Eyes:  Negative for photophobia, pain, discharge, redness, itching and visual disturbance.   Respiratory:  Negative for apnea, cough, choking, chest tightness, shortness of breath, wheezing and stridor.    Cardiovascular:  Negative for chest pain, palpitations and leg swelling.   Gastrointestinal:  Negative for abdominal distention, abdominal pain, anal bleeding, blood in stool,  constipation, diarrhea, nausea, rectal pain and vomiting.   Endocrine: Negative for cold intolerance, heat intolerance, polydipsia, polyphagia and polyuria.   Genitourinary:  Negative for decreased urine volume, difficulty urinating, dysuria, enuresis, flank pain, frequency, genital sores, hematuria, penile discharge, penile pain, penile swelling, scrotal swelling, testicular pain and urgency.   Musculoskeletal:  Negative for arthralgias, back pain, gait problem, joint swelling, myalgias, neck pain and neck stiffness.   Skin:  Negative for color change, pallor, rash and wound.   Allergic/Immunologic: Negative for environmental allergies, food allergies and immunocompromised state.   Neurological:  Negative for dizziness, tremors, seizures, syncope, facial asymmetry, speech difficulty, weakness, light-headedness, numbness and headaches.   Hematological:  Negative for adenopathy. Does not bruise/bleed easily.   Psychiatric/Behavioral:  Negative for agitation, behavioral problems, confusion, decreased concentration, dysphoric mood, hallucinations, self-injury, sleep disturbance and suicidal ideas. The patient is not nervous/anxious and is not hyperactive.        Objective:      Physical Exam  Vitals reviewed.   Constitutional:       General: He is not in acute distress.     Appearance: He is well-developed. He is not diaphoretic.   HENT:      Head: Normocephalic.      Right Ear: External ear normal.      Left Ear: External ear normal.      Nose: Nose normal.      Right Sinus: No maxillary sinus tenderness or frontal sinus tenderness.      Left Sinus: No maxillary sinus tenderness or frontal sinus tenderness.      Mouth/Throat:      Pharynx: No oropharyngeal exudate.   Eyes:      General: Lids are normal. No scleral icterus.        Right eye: No discharge.         Left eye: No discharge.      Extraocular Movements:      Right eye: Normal extraocular motion.      Left eye: Normal extraocular motion.       Conjunctiva/sclera:      Right eye: Right conjunctiva is not injected. No hemorrhage.     Left eye: Left conjunctiva is not injected. No hemorrhage.     Pupils: Pupils are equal, round, and reactive to light.   Neck:      Thyroid: No thyromegaly.      Vascular: No JVD.      Trachea: No tracheal deviation.   Cardiovascular:      Rate and Rhythm: Normal rate.   Pulmonary:      Effort: Pulmonary effort is normal. No respiratory distress.      Breath sounds: No stridor.   Abdominal:      General: Bowel sounds are normal.      Palpations: Abdomen is soft. There is no hepatomegaly, splenomegaly or mass.      Tenderness: There is no abdominal tenderness.   Musculoskeletal:         General: No tenderness. Normal range of motion.      Cervical back: Normal range of motion and neck supple.   Lymphadenopathy:      Head:      Right side of head: No posterior auricular or occipital adenopathy.      Left side of head: No posterior auricular or occipital adenopathy.      Cervical: No cervical adenopathy.      Right cervical: No superficial, deep or posterior cervical adenopathy.     Left cervical: No superficial, deep or posterior cervical adenopathy.      Upper Body:      Right upper body: No supraclavicular adenopathy.      Left upper body: No supraclavicular adenopathy.   Skin:     General: Skin is dry.      Findings: No erythema or rash.      Nails: There is no clubbing.   Neurological:      Mental Status: He is alert and oriented to person, place, and time.      Cranial Nerves: No cranial nerve deficit.      Coordination: Coordination normal.   Psychiatric:         Behavior: Behavior normal.         Thought Content: Thought content normal.         Judgment: Judgment normal.             Assessment:      1. Kidney replaced by transplant    2. Solitary pulmonary nodule    3. Chronic anticoagulation    4. Acute deep vein thrombosis (DVT) of other specified vein of left lower extremity    5. Anemia associated with chronic renal  failure    6. Living-related donor kidney transplant (daughter) - 6/12/15           Med Onc Chart Routing      Follow up with physician . Return to clinic in 6-9 months with standing labs of CBC CMP iron TIBC ferritin SPEP and free light chain along with CT chest only   Follow up with TANYA . Patient can be seen by [facility] as well   Infusion scheduling note    Injection scheduling note    Labs    Imaging   CT chest only in follow-up 6-9 months   Pharmacy appointment    Other referrals                   Plan:     Reviewed information with patient at this time would recommend follow-up in 6-9 months with CT chest for evaluation of pulmonary nodule patient smoking prior to 2012.  Time also standing labs of CBC CMP serum iron TIBC ferritin and serum protein electrophoresis serum free light chain patient's pancytopenia is really not particularly severe.  High likelihood this is represents a concurrence of anemia secondary to renal failure would not qualify for erythropoietin therapy at this point because of hemoglobin being greater than 10 g but will need follow-up discussed implications with him as above        Da Bojorquez Jr, MD FACP

## 2024-03-19 LAB
BILIRUB UR QL STRIP: NEGATIVE
CLARITY UR REFRACT.AUTO: CLEAR
COLOR UR AUTO: YELLOW
GLUCOSE UR QL STRIP: NEGATIVE
HGB UR QL STRIP: NEGATIVE
KETONES UR QL STRIP: NEGATIVE
LEUKOCYTE ESTERASE UR QL STRIP: NEGATIVE
NITRITE UR QL STRIP: NEGATIVE
PH UR STRIP: 5 [PH] (ref 5–8)
PROT UR QL STRIP: NEGATIVE
SP GR UR STRIP: 1.01 (ref 1–1.03)
URN SPEC COLLECT METH UR: NORMAL

## 2024-03-26 ENCOUNTER — ANESTHESIA (OUTPATIENT)
Dept: ENDOSCOPY | Facility: HOSPITAL | Age: 69
End: 2024-03-26
Payer: COMMERCIAL

## 2024-03-26 ENCOUNTER — ANESTHESIA EVENT (OUTPATIENT)
Dept: ENDOSCOPY | Facility: HOSPITAL | Age: 69
End: 2024-03-26
Payer: COMMERCIAL

## 2024-03-26 ENCOUNTER — HOSPITAL ENCOUNTER (OUTPATIENT)
Facility: HOSPITAL | Age: 69
Discharge: HOME | End: 2024-03-26
Attending: INTERNAL MEDICINE | Admitting: INTERNAL MEDICINE
Payer: COMMERCIAL

## 2024-03-26 DIAGNOSIS — D50.0 IRON DEFICIENCY ANEMIA DUE TO CHRONIC BLOOD LOSS: Primary | ICD-10-CM

## 2024-03-26 LAB
GLUCOSE SERPL-MCNC: 106 MG/DL (ref 70–110)
POCT GLUCOSE: 106 MG/DL (ref 70–110)

## 2024-03-26 PROCEDURE — 88342 IMHCHEM/IMCYTCHM 1ST ANTB: CPT | Performed by: STUDENT IN AN ORGANIZED HEALTH CARE EDUCATION/TRAINING PROGRAM

## 2024-03-26 PROCEDURE — 88342 IMHCHEM/IMCYTCHM 1ST ANTB: CPT | Mod: 26,,, | Performed by: STUDENT IN AN ORGANIZED HEALTH CARE EDUCATION/TRAINING PROGRAM

## 2024-03-26 PROCEDURE — 63600175 PHARM REV CODE 636 W HCPCS: Performed by: NURSE ANESTHETIST, CERTIFIED REGISTERED

## 2024-03-26 PROCEDURE — 37000009 HC ANESTHESIA EA ADD 15 MINS: Performed by: INTERNAL MEDICINE

## 2024-03-26 PROCEDURE — 88305 TISSUE EXAM BY PATHOLOGIST: CPT | Performed by: STUDENT IN AN ORGANIZED HEALTH CARE EDUCATION/TRAINING PROGRAM

## 2024-03-26 PROCEDURE — 27201012 HC FORCEPS, HOT/COLD, DISP: Performed by: INTERNAL MEDICINE

## 2024-03-26 PROCEDURE — 43239 EGD BIOPSY SINGLE/MULTIPLE: CPT | Mod: ,,, | Performed by: INTERNAL MEDICINE

## 2024-03-26 PROCEDURE — 37000008 HC ANESTHESIA 1ST 15 MINUTES: Performed by: INTERNAL MEDICINE

## 2024-03-26 PROCEDURE — 43239 EGD BIOPSY SINGLE/MULTIPLE: CPT | Performed by: INTERNAL MEDICINE

## 2024-03-26 PROCEDURE — 88305 TISSUE EXAM BY PATHOLOGIST: CPT | Mod: 26,,, | Performed by: STUDENT IN AN ORGANIZED HEALTH CARE EDUCATION/TRAINING PROGRAM

## 2024-03-26 PROCEDURE — 25000003 PHARM REV CODE 250: Performed by: NURSE ANESTHETIST, CERTIFIED REGISTERED

## 2024-03-26 RX ORDER — SODIUM CHLORIDE, SODIUM LACTATE, POTASSIUM CHLORIDE, CALCIUM CHLORIDE 600; 310; 30; 20 MG/100ML; MG/100ML; MG/100ML; MG/100ML
INJECTION, SOLUTION INTRAVENOUS CONTINUOUS PRN
Status: DISCONTINUED | OUTPATIENT
Start: 2024-03-26 | End: 2024-03-26

## 2024-03-26 RX ORDER — ROSUVASTATIN CALCIUM 40 MG/1
40 TABLET, COATED ORAL DAILY
Qty: 90 TABLET | Refills: 0 | Status: SHIPPED | OUTPATIENT
Start: 2024-03-26

## 2024-03-26 RX ORDER — LIDOCAINE HYDROCHLORIDE 10 MG/ML
INJECTION, SOLUTION EPIDURAL; INFILTRATION; INTRACAUDAL; PERINEURAL
Status: DISCONTINUED | OUTPATIENT
Start: 2024-03-26 | End: 2024-03-26

## 2024-03-26 RX ORDER — SODIUM CHLORIDE 9 MG/ML
INJECTION, SOLUTION INTRAVENOUS CONTINUOUS
Status: DISCONTINUED | OUTPATIENT
Start: 2024-03-26 | End: 2024-03-26 | Stop reason: HOSPADM

## 2024-03-26 RX ORDER — PROPOFOL 10 MG/ML
VIAL (ML) INTRAVENOUS
Status: DISCONTINUED | OUTPATIENT
Start: 2024-03-26 | End: 2024-03-26

## 2024-03-26 RX ADMIN — SODIUM CHLORIDE, SODIUM LACTATE, POTASSIUM CHLORIDE, AND CALCIUM CHLORIDE: 600; 310; 30; 20 INJECTION, SOLUTION INTRAVENOUS at 10:03

## 2024-03-26 RX ADMIN — PROPOFOL 40 MG: 10 INJECTION, EMULSION INTRAVENOUS at 10:03

## 2024-03-26 RX ADMIN — LIDOCAINE HYDROCHLORIDE 50 MG: 10 SOLUTION INTRAVENOUS at 10:03

## 2024-03-26 RX ADMIN — PROPOFOL 80 MG: 10 INJECTION, EMULSION INTRAVENOUS at 10:03

## 2024-03-26 NOTE — DISCHARGE SUMMARY
[facility] ([facility])  Discharge Note  Short Stay    Procedure(s) (LRB):  EGD (ESOPHAGOGASTRODUODENOSCOPY) 3/1-pt cleared, ok to hold eliquis for 3 days (N/A)      OUTCOME: Patient tolerated treatment/procedure well without complication and is now ready for discharge.    DISPOSITION: Home or Self Care    FINAL DIAGNOSIS:  Iron deficiency anemia due to chronic blood loss    FOLLOWUP: With primary care provider    DISCHARGE INSTRUCTIONS:  No discharge procedures on file.

## 2024-03-26 NOTE — TRANSFER OF CARE
"Anesthesia Transfer of Care Note    Patient: Mitch Whittaker    Procedure(s) Performed: Procedure(s) (LRB):  EGD (ESOPHAGOGASTRODUODENOSCOPY) 3/1-pt cleared, ok to hold eliquis for 3 days (N/A)    Patient location: GI    Anesthesia Type: MAC    Transport from OR: Transported from OR on room air with adequate spontaneous ventilation    Post pain: adequate analgesia    Post assessment: no apparent anesthetic complications and tolerated procedure well    Post vital signs: stable    Level of consciousness: awake    Nausea/Vomiting: no nausea/vomiting    Complications: none    Transfer of care protocol was followed      Last vitals: Visit Vitals  BP (!) 128/59 (BP Location: Left arm, Patient Position: Lying)   Pulse 109   Temp 36.6 °C (97.8 °F) (Tympanic)   Resp 16   Ht 5' 6" (1.676 m)   Wt 124.7 kg (275 lb)   SpO2 99%   BMI 44.39 kg/m²     "

## 2024-03-26 NOTE — ANESTHESIA PREPROCEDURE EVALUATION
03/26/2024  Mitch Whittaker is a 68 y.o., male.    Patient Active Problem List   Diagnosis    Anemia associated with chronic renal failure    Obesity    Acquired renal cyst of left kidney    Obstructive sleep apnea    Pseudophakia    Coronary artery disease of native artery of native heart with stable angina pectoris    Living-related donor kidney transplant (daughter) - 6/12/15    Diabetic sensorimotor polyneuropathy    Chronic immunosuppression with Prograf, MMF and prednisone    Secondary hyperparathyroidism of renal origin    Type II diabetes mellitus with renal manifestations    Hypertension associated with stage 3 chronic kidney disease due to type 2 diabetes mellitus    DM (diabetes mellitus), type 2 with complications    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, with long-term current use of insulin    Epiretinal membrane (ERM) of both eyes    History of colon polyps    Benign prostatic hyperplasia without lower urinary tract symptoms    PCO (posterior capsular opacification), left    Chronic combined systolic and diastolic congestive heart failure    Deep vein thrombosis (DVT) of lower extremity    Chronic venous insufficiency of lower extremity    Morbid obesity with BMI of 40.0-44.9, adult    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    LBBB (left bundle branch block)    Lambda light chain disease    Cellulitis of extremity    Head injury    Dizziness    Chronic anticoagulation    Fall    Hypotension due to hypovolemia    Severe obesity (BMI >= 40)    Orthostatic hypotension    Kidney transplanted    Syncope and collapse    Dehydration    Chronic cough    Cervical radiculopathy    Bilateral carpal tunnel syndrome    Preop cardiovascular exam    Chronic diarrhea    Acute on chronic  diastolic congestive heart failure    Iron deficiency anemia due to chronic blood loss      Pre-op Assessment    I have reviewed the Patient Summary Reports.    I have reviewed the NPO Status.   I have reviewed the Medications.     Review of Systems  Anesthesia Hx:  No problems with previous Anesthesia             Denies Family Hx of Anesthesia complications.    Denies Personal Hx of Anesthesia complications.                    Social:  Non-Smoker, No Alcohol Use       Hematology/Oncology:    Oncology Normal    -- Anemia:                                  EENT/Dental:  EENT/Dental Normal           Cardiovascular:     Hypertension   CAD (Nonobstructive CAD on 2014 [facility])    Dysrhythmias (LBBB, Frequent PVC's)   CHF       ECG has been reviewed. DVT    Cleared by Dr Meade with normal NMST in 2023                         Pulmonary:        Sleep Apnea                Renal/:  Chronic Renal Disease, CKD  BPH Renal transplant recipient  Cr 2.5, stable             Hepatic/GI:       Hepatitis (CMV)           Musculoskeletal:         Spine Disorders: cervical Degenerative disease           Neurological:        Peripheral Neuropathy                          Endocrine:  Diabetes    Diabetes                 Parathyroid Disease, Hyperparathyroidism          Morbid Obesity / BMI > 40  Dermatological:  Skin Normal    Psych:  Psychiatric Normal                    Physical Exam  General: Alert, Oriented, Well nourished and Cooperative    Airway:  Mallampati: II   Mouth Opening: Normal  TM Distance: Normal  Tongue: Normal  Neck ROM: Normal ROM    Dental:  Partial Dentures    2023 TTE    Left Ventricle: The left ventricle is normal in size. Moderately increased ventricular mass. Moderately increased wall thickness. Normal wall motion. There is low normal systolic function with a visually estimated ejection fraction of 50 - 55%. There is normal diastolic function.    Left Atrium: Left atrium is severely dilated.    Right Ventricle: Normal  right ventricular cavity size. Wall thickness is normal. Right ventricle wall motion  is normal. Systolic function is normal.    Right Atrium: Right atrium is dilated.    IVC/SVC: Normal venous pressure at 3 mmHg.       Anesthesia Plan  Type of Anesthesia, risks & benefits discussed:    Anesthesia Type: MAC  Intra-op Monitoring Plan: Standard ASA Monitors  Post Op Pain Control Plan: IV/PO Opioids PRN  Informed Consent: Informed consent signed with the Patient and all parties understand the risks and agree with anesthesia plan.  All questions answered.   ASA Score: 3  Day of Surgery Review of History & Physical: H&P Update referred to the surgeon/provider.    Ready For Surgery From Anesthesia Perspective.     .

## 2024-03-26 NOTE — ANESTHESIA POSTPROCEDURE EVALUATION
Anesthesia Post Evaluation    Patient: Mitch Whittaker    Procedure(s) Performed: Procedure(s) (LRB):  EGD (ESOPHAGOGASTRODUODENOSCOPY) 3/1-pt cleared, ok to hold eliquis for 3 days (N/A)    Final Anesthesia Type: MAC      Patient location during evaluation: GI PACU  Patient participation: Yes- Able to Participate  Level of consciousness: awake and alert and oriented  Post-procedure vital signs: reviewed and stable  Pain management: adequate  Airway patency: patent  MARION mitigation strategies: Multimodal analgesia  PONV status at discharge: No PONV  Anesthetic complications: no      Cardiovascular status: hemodynamically stable  Respiratory status: unassisted, spontaneous ventilation and room air  Hydration status: euvolemic  Follow-up not needed.              Vitals Value Taken Time   /58 03/26/24 1120   Temp 36.6 °C (97.9 °F) 03/26/24 1100   Pulse 96 03/26/24 1120   Resp 20 03/26/24 1120   SpO2 95 % 03/26/24 1120         Event Time   Out of Recovery 11:28:14         Pain/Lakeisha Score: Lakeisha Score: 10 (3/26/2024 11:20 AM)

## 2024-03-26 NOTE — TELEPHONE ENCOUNTER
Care Due:                  Date            Visit Type   Department     Provider  --------------------------------------------------------------------------------                                EP -                              PRIMARY      [facility] INTERNAL  Last Visit: 03-      CARE ([facility])   MARCIO Kowalski                              EP -                              PRIMARY      [facility] INTERNAL  Next Visit: 06-      CARE ([facility])   MARCIO Kowalski                                                            Last  Test          Frequency    Reason                     Performed    Due Date  --------------------------------------------------------------------------------    Mg Level....  12 months..  magnesium................  Not Found    Overdue    [employer] Embedded Care Due Messages. Reference number: [id number].   3/25/2024 7:36:14 PM CDT

## 2024-03-26 NOTE — PROVATION PATIENT INSTRUCTIONS
Discharge Summary/Instructions after an Endoscopic Procedure  Patient Name: Mitch Whittaker  Patient MRN: [id number]  Patient YOB: 1955 Tuesday, March 26, 2024 Daniella Shah MD  Dear patient,  As a result of recent federal legislation (The Federal Cures Act), you may   receive lab or pathology results from your procedure in your [facility]   account before your physician is able to contact you. Your physician or   their representative will relay the results to you with their   recommendations at their soonest availability.  Thank you,  RESTRICTIONS:  During your procedure today, you received medications for sedation.  These   medications may affect your judgment, balance and coordination.  Therefore,   for 24 hours, you have the following restrictions:   - DO NOT drive a car, operate machinery, make legal/financial decisions,   sign important papers or drink alcohol.    ACTIVITY:  Today: no heavy lifting, straining or running due to procedural   sedation/anesthesia.  The following day: return to full activity including work.  DIET:  Eat and drink normally unless instructed otherwise.     TREATMENT FOR COMMON SIDE EFFECTS:  - Mild abdominal pain, nausea, belching, bloating or excessive gas:  rest,   eat lightly and use a heating pad.  - Sore Throat: treat with throat lozenges and/or gargle with warm salt   water.  - Because air was used during the procedure, expelling large amounts of air   from your rectum or belching is normal.  - If a bowel prep was taken, you may not have a bowel movement for 1-3 days.    This is normal.  SYMPTOMS TO WATCH FOR AND REPORT TO YOUR PHYSICIAN:  1. Abdominal pain or bloating, other than gas cramps.  2. Chest pain.  3. Back pain.  4. Signs of infection such as: chills or fever occurring within 24 hours   after the procedure.  5. Rectal bleeding, which would show as bright red, maroon, or black stools.   (A tablespoon of blood from the rectum is not serious, especially  if   hemorrhoids are present.)  6. Vomiting.  7. Weakness or dizziness.  GO DIRECTLY TO THE NEAREST EMERGENCY ROOM IF YOU HAVE ANY OF THE FOLLOWING:      Difficulty breathing              Chills and/or fever over 101 F   Persistent vomiting and/or vomiting blood   Severe abdominal pain   Severe chest pain   Black, tarry stools   Bleeding- more than one tablespoon   Any other symptom or condition that you feel may need urgent attention  Your doctor recommends these additional instructions:  If any biopsies were taken, your doctors clinic will contact you in 1 to 2   weeks with any results.  - Discharge patient to home (via wheelchair).   - Resume previous diet.   - Continue present medications.   - Await pathology results.   - Patient has a contact number available for emergencies.  The signs and   symptoms of potential delayed complications were discussed with the   patient.  Return to normal activities tomorrow.  Written discharge   instructions were provided to the patient.  For questions, problems or results please call your physician Daniella Shah MD at Work:  [phone]  If you have any questions about the above instructions, call the GI   department at [phone] or call the endoscopy unit at [phone]   from 7am until 3 pm.  [facility] [place], EMERGENCY ROOM PHONE NUMBER:   [phone]  IF A COMPLICATION OR EMERGENCY SITUATION ARISES AND YOU ARE UNABLE TO REACH   YOUR PHYSICIAN - GO DIRECTLY TO THE EMERGENCY ROOM.  I have read or have had read to me these discharge instructions for my   procedure and have received a written copy.  I understand these   instructions and will follow-up with my physician if I have any questions.     __________________________________       _____________________________________  Nurse Signature                                          Patient/Designated   Responsible Party Signature  MD Daniella Ambrocio MD  3/26/2024  10:59:18 AM  [employer]

## 2024-03-26 NOTE — PLAN OF CARE
DR HANKS AT BEDSIDE TO SPEAK TO PT. REGARDING RESULTS.  VSS, NO GI BLEEDING, NO ABD. PAIN, NO N/V. PT. DISCHARGED FROM UNIT.

## 2024-03-26 NOTE — H&P
Short Stay Endoscopy History and Physical    PCP - Alix Kowalski DO    Procedure - EGD  ASA - per anesthesia  Mallampati - per anesthesia  History of Anesthesia problems - no  Family history Anesthesia problems -  no     HPI:  This is a 68 y.o. male here for evaluation of :   Active Hospital Problems    Diagnosis  POA    *Iron deficiency anemia due to chronic blood loss [D50.0]  Yes      Resolved Hospital Problems   No resolved problems to display.         ROS:  CONSTITUTIONAL: Denies weight change,  fatigue, fevers, chills, night sweats.  CARDIOVASCULAR: Denies chest pain, shortness of breath, orthopnea and edema.  RESPIRATORY: Denies cough, hemoptysis, dyspnea, and wheezing.  GI: See HPI.    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 RNA NEGATIVE 5/2015, 2021, 2022    History of colon polyps     HPTH (hyperparathyroidism)     Hyperlipidemia     Hypertension associated with stage 3 chronic kidney disease due to type 2 diabetes mellitus     LBBB (left bundle branch block) 12/20/2021    Morbid obesity with BMI of 45.0-49.9, adult     Nephrolithiasis 6/7/2013    PCO (posterior capsular opacification), left 03/04/2019    Proteinuria     resolved s/p transplant    S/P  kidney transplant     Sleep apnea     Type 2 diabetes, uncontrolled, with retinopathy     Type II diabetes mellitus with renal manifestations        Surgical History:   Past Surgical History:   Procedure Laterality Date    CARDIAC CATHETERIZATION  2008    normal coronary    CARPAL TUNNEL RELEASE Right 12/1/2023    Procedure: RELEASE, CARPAL TUNNEL;  Surgeon: Noel Almonte MD;  Location: [facility] OR;  Service: Orthopedics;  Laterality: Right;    COLONOSCOPY N/A 4/5/2018    Procedure: COLONOSCOPY;  Surgeon: Chava Ronquillo MD;  Location: [facility] ENDO;  Service: Endoscopy;  Laterality: N/A;    COLONOSCOPY N/A 5/2/2022    Procedure: COLONOSCOPY;  Surgeon: Alix Puente MD;  Location: [facility] ENDO;  Service: Endoscopy;  Laterality: N/A;    COLONOSCOPY N/A 6/7/2023    Procedure: COLONOSCOPY - rule out CMV  Cardiac clearance/Eliquis hold approval received on 05/21/23 per Dr. Meade, cardiology.  Note in encounters.  LB;  Surgeon: Daniella Shah MD;  Location: [facility] ENDO;  Service: Endoscopy;  Laterality: N/A;    KIDNEY TRANSPLANT      RETINAL LASER PROCEDURE         Family History:  Family History   Problem Relation Age of Onset    Diabetes Mother     Hypertension Mother     Heart failure Mother     Kidney disease Sister         ESRD    Diabetes Sister     Diabetes Maternal Grandmother     Cancer Neg Hx        Social History:   Social History     Tobacco Use    Smoking status: Former     Current packs/day: 0.00     Types: Cigarettes     Quit date: 5/25/2013     Years since quitting: 10.8     Passive exposure: Past    Smokeless tobacco: Former     Quit date: 6/11/2013    Tobacco comments:     used marijuana since 6141-3016, stopped after started dialysis   Substance Use Topics    Alcohol use: No     Alcohol/week: 0.0 standard drinks of alcohol    Drug use: No     Comment:         Allergy  Review of patient's allergies indicates:   Allergen Reactions    Lisinopril Other (See Comments)     Other reaction(s):  cough  "   Actos  [pioglitazone] Other (See Comments)     Other reaction(s): CHF    Metformin Other (See Comments)     Other reaction(s): renal insuff  Other reaction(s): CHF       Medications:   Current Facility-Administered Medications on File Prior to Encounter   Medication Dose Route Frequency Provider Last Rate Last Admin    ceFAZolin (ANCEF) 3 gram in dextrose 5% IVPB  3 g Intravenous On Call Procedure Bren Raphael PA-C        chlorhexidine 0.12 % solution 10 mL  10 mL Mouth/Throat On Call Procedure Bren Raphael PA-C         Current Outpatient Medications on File Prior to Encounter   Medication Sig Dispense Refill    amitriptyline (ELAVIL) 25 MG tablet Take 1 tablet (25 mg total) by mouth nightly as needed for Insomnia. 30 tablet 2    apixaban (ELIQUIS) 5 mg Tab Take 1 tablet (5 mg total) by mouth 2 (two) times daily. 180 tablet 1    aspirin (ECOTRIN) 81 MG EC tablet Take 1 tablet by mouth Daily.       BD ULTRA-FINE MINI PEN NEEDLE 31 gauge x 3/16" Ndle Use to inject insulin as needed up to 4 times daily 100 each 5    blood sugar diagnostic (CONTOUR NEXT TEST STRIPS) Strp by Misc.(Non-Drug; Combo Route) route 4 (four) times daily before meals and nightly. 100 each 1    blood-glucose meter (FREESTYLE LITE METER) kit 1 each 4 (four) times daily. 1 each 0    calcitRIOL (ROCALTROL) 0.25 MCG Cap Take 1 capsule (0.25 mcg total) by mouth once daily. 30 capsule 11    chlorhexidine (PERIDEX) 0.12 % solution Rinse with 1/2  ounce (15 mLs)  twice daily 473 mL 0    COVID ofs83-90,12up,,andu,,PF, (SPIKEVAX 9612-8904,12Y UP,,PF,) 50 mcg/0.5 mL injection Inject into the muscle. 0.5 mL 0    famotidine (PEPCID) 20 MG tablet TAKE ONE TABLET BY MOUTH EVERY EVENING 30 tablet 11    ferrous sulfate (FEOSOL) 325 mg (65 mg iron) Tab tablet Take 1 tablet (325 mg total) by mouth daily with breakfast. 30 tablet 11    fish oil-omega-3 fatty acids 300-1,000 mg capsule Take 1 g by mouth once daily.      furosemide (LASIX) 80 MG tablet Take " one-half tablet (40 mg) by mouth 2 (two) times daily. 30 tablet 11    gabapentin (NEURONTIN) 100 MG capsule Take 1 capsule (100 mg total) by mouth 3 (three) times daily. 90 capsule 11    HYDROcodone-acetaminophen (NORCO) 5-325 mg per tablet Take 1 tablet by mouth every 6 (six) hours as needed for Pain. 15 tablet 0    insulin (LANTUS SOLOSTAR U-100 INSULIN) glargine 100 units/mL SubQ pen Inject 35 Units into the skin 2 (two) times daily. 60 mL 0    insulin aspart U-100 (NOVOLOG FLEXPEN U-100 INSULIN) 100 unit/mL (3 mL) InPn pen Inject 25 Units into the skin 3 (three) times daily with meals. 30 mL 2    insulin glargine (LANTUS U-100 INSULIN) 100 unit/mL injection Inject 35 Units into the skin 2 (two) times a day. 60 mL 0    ketoconazole (NIZORAL) 200 mg Tab Take HALF A tablet (100 mg total) by mouth once daily. 15 tablet 9    lancets (MICROLET LANCET) Misc 100 lancets by Misc.(Non-Drug; Combo Route) route 4 (four) times daily before meals and nightly. 100 each 11    magnesium oxide (MAG-OX) 400 mg (241.3 mg magnesium) tablet Take 1 tablet (400 mg total) by mouth once daily. 90 tablet 2    metoprolol succinate (TOPROL-XL) 25 MG 24 hr tablet Take 1 tablet (25 mg total) by mouth 2 (two) times daily. 180 tablet 3    montelukast (SINGULAIR) 10 mg tablet Take 1 tablet (10 mg total) by mouth every evening. 90 tablet 1    multivitamin (THERAGRAN) tablet Take 1 tablet by mouth once daily.      mycophenolate (CELLCEPT) 250 mg Cap Take 2 capsules (500 mg total) by mouth 2 (two) times daily. 120 capsule 11    ondansetron (ZOFRAN) 4 MG tablet Take 1 tablet (4 mg total) by mouth every 6 (six) hours. 30 tablet 0    potassium chloride SA (K-DUR,KLOR-CON) 20 MEQ tablet Take 2 tablets (40 mEq total) by mouth once daily. 180 tablet 1    predniSONE (DELTASONE) 5 MG tablet Take 1 tablet (5 mg total) by mouth once daily. 30 tablet 11    promethazine-dextromethorphan (PROMETHAZINE-DM) 6.25-15 mg/5 mL Syrp Take 5 mLs by mouth every 6 (six)  hours as needed (cough). 180 mL 0    rosuvastatin (CRESTOR) 40 MG Tab Take 1 tablet (40 mg total) by mouth once daily. 90 tablet 0    sacubitriL-valsartan (ENTRESTO)  mg per tablet Take 1 tablet by mouth 2 (two) times daily. 180 tablet 1    semaglutide (OZEMPIC) 0.25 mg or 0.5 mg (2 mg/3 mL) pen injector Inject 0.5 mg into the skin every 7 days. Call office in 6 weeks will increase dose 3 mL 2    tacrolimus (PROGRAF) 1 MG Cap Take 5 capsules (5 mg) by mouth every morning and 4 capsules (4 mg) by mouth every evening (9 mg per day total). 270 capsule 11    tamsulosin (FLOMAX) 0.4 mg Cap Take 1 capsule (0.4 mg total) by mouth once daily. 30 capsule 2    traMADoL (ULTRAM) 50 mg tablet Take 1 tablet (50 mg total) by mouth every 4 (four) hours as needed for Pain. 30 tablet 0    triamcinolone acetonide 0.1% (KENALOG) 0.1 % ointment Apply topically 2 (two) times daily. Use on bilateral lower legs. 454 g 1       Physical Exam:  Vital Signs: There were no vitals filed for this visit.  General Appearance: Well appearing in no acute distress  ENT: OP clear  Chest: CTA B  CV: RRR, no m/r/g  Abd: s/nt/nd/nabs  Ext: no edema    Labs:  Reviewed    IMP:  Active Hospital Problems    Diagnosis  POA    *Iron deficiency anemia due to chronic blood loss [D50.0]  Yes      Resolved Hospital Problems   No resolved problems to display.         Plan:  I have explained the risks and benefits of endoscopy procedures to the patient including but not limited to bleeding, perforation, infection, and death. The patient wishes to proceed.

## 2024-03-27 VITALS
HEART RATE: 96 BPM | DIASTOLIC BLOOD PRESSURE: 58 MMHG | BODY MASS INDEX: 44.2 KG/M2 | SYSTOLIC BLOOD PRESSURE: 140 MMHG | HEIGHT: 66 IN | RESPIRATION RATE: 20 BRPM | OXYGEN SATURATION: 95 % | TEMPERATURE: 98 F | WEIGHT: 275 LBS

## 2024-03-27 NOTE — TELEPHONE ENCOUNTER
Provider Staff:  Action required for this patient     Please see care gap opportunities below in Care Due Message:   Magnesium lab outdated    Thanks!  [facility]     Appointments      Date Provider   Last Visit   3/28/2023 Alix Kowalski DO   Next Visit   6/6/2024 Alix Kowalski DO     Refill Decision Note   Mitch hWittaker  is requesting a refill authorization.  Brief Assessment and Rationale for Refill:  Approve     Medication Therapy Plan:  ED documentation reviewed. No changes to therapy noted.       Extended chart review required: Yes   Comments:     Note composed:7:04 PM 03/26/2024

## 2024-04-01 DIAGNOSIS — M79.642 LEFT HAND PAIN: Primary | ICD-10-CM

## 2024-04-01 LAB
FINAL PATHOLOGIC DIAGNOSIS: NORMAL
GROSS: NORMAL
Lab: NORMAL
SUPPLEMENTAL DIAGNOSIS: NORMAL

## 2024-04-02 ENCOUNTER — OFFICE VISIT (OUTPATIENT)
Dept: ORTHOPEDICS | Facility: CLINIC | Age: 69
End: 2024-04-02
Payer: COMMERCIAL

## 2024-04-02 ENCOUNTER — HOSPITAL ENCOUNTER (OUTPATIENT)
Dept: RADIOLOGY | Facility: HOSPITAL | Age: 69
Discharge: HOME | End: 2024-04-02
Attending: ORTHOPAEDIC SURGERY
Payer: COMMERCIAL

## 2024-04-02 VITALS — BODY MASS INDEX: 44.19 KG/M2 | WEIGHT: 274.94 LBS | HEIGHT: 66 IN

## 2024-04-02 DIAGNOSIS — M25.522 LEFT ELBOW PAIN: Primary | ICD-10-CM

## 2024-04-02 DIAGNOSIS — G56.03 BILATERAL CARPAL TUNNEL SYNDROME: Primary | ICD-10-CM

## 2024-04-02 DIAGNOSIS — M25.522 LEFT ELBOW PAIN: ICD-10-CM

## 2024-04-02 DIAGNOSIS — Z01.818 PREOPERATIVE EXAMINATION: ICD-10-CM

## 2024-04-02 DIAGNOSIS — M77.12 LATERAL EPICONDYLITIS OF LEFT ELBOW: ICD-10-CM

## 2024-04-02 DIAGNOSIS — M79.642 LEFT HAND PAIN: ICD-10-CM

## 2024-04-02 PROCEDURE — 73130 X-RAY EXAM OF HAND: CPT | Mod: TC,LT

## 2024-04-02 PROCEDURE — 1159F MED LIST DOCD IN RCRD: CPT | Mod: CPTII,S$GLB,, | Performed by: ORTHOPAEDIC SURGERY

## 2024-04-02 PROCEDURE — 99999 PR PBB SHADOW E&M-EST. PATIENT-LVL IV: CPT | Mod: PBBFAC,,, | Performed by: ORTHOPAEDIC SURGERY

## 2024-04-02 PROCEDURE — 20526 THER INJECTION CARP TUNNEL: CPT | Mod: 59,LT,S$GLB, | Performed by: ORTHOPAEDIC SURGERY

## 2024-04-02 PROCEDURE — 1160F RVW MEDS BY RX/DR IN RCRD: CPT | Mod: CPTII,S$GLB,, | Performed by: ORTHOPAEDIC SURGERY

## 2024-04-02 PROCEDURE — 73130 X-RAY EXAM OF HAND: CPT | Mod: 26,LT,, | Performed by: RADIOLOGY

## 2024-04-02 PROCEDURE — 3288F FALL RISK ASSESSMENT DOCD: CPT | Mod: CPTII,S$GLB,, | Performed by: ORTHOPAEDIC SURGERY

## 2024-04-02 PROCEDURE — 1101F PT FALLS ASSESS-DOCD LE1/YR: CPT | Mod: CPTII,S$GLB,, | Performed by: ORTHOPAEDIC SURGERY

## 2024-04-02 PROCEDURE — 4010F ACE/ARB THERAPY RXD/TAKEN: CPT | Mod: CPTII,S$GLB,, | Performed by: ORTHOPAEDIC SURGERY

## 2024-04-02 PROCEDURE — 20551 NJX 1 TENDON ORIGIN/INSJ: CPT | Mod: LT,S$GLB,, | Performed by: ORTHOPAEDIC SURGERY

## 2024-04-02 PROCEDURE — 3051F HG A1C>EQUAL 7.0%<8.0%: CPT | Mod: CPTII,S$GLB,, | Performed by: ORTHOPAEDIC SURGERY

## 2024-04-02 PROCEDURE — 73080 X-RAY EXAM OF ELBOW: CPT | Mod: 26,LT,, | Performed by: RADIOLOGY

## 2024-04-02 PROCEDURE — 1125F AMNT PAIN NOTED PAIN PRSNT: CPT | Mod: CPTII,S$GLB,, | Performed by: ORTHOPAEDIC SURGERY

## 2024-04-02 PROCEDURE — 99214 OFFICE O/P EST MOD 30 MIN: CPT | Mod: 25,S$GLB,, | Performed by: ORTHOPAEDIC SURGERY

## 2024-04-02 PROCEDURE — 73080 X-RAY EXAM OF ELBOW: CPT | Mod: TC,LT

## 2024-04-02 PROCEDURE — 3008F BODY MASS INDEX DOCD: CPT | Mod: CPTII,S$GLB,, | Performed by: ORTHOPAEDIC SURGERY

## 2024-04-02 RX ORDER — TRIAMCINOLONE ACETONIDE 40 MG/ML
40 INJECTION, SUSPENSION INTRA-ARTICULAR; INTRAMUSCULAR
Status: DISCONTINUED | OUTPATIENT
Start: 2024-04-02 | End: 2024-04-02 | Stop reason: HOSPADM

## 2024-04-02 RX ADMIN — TRIAMCINOLONE ACETONIDE 40 MG: 40 INJECTION, SUSPENSION INTRA-ARTICULAR; INTRAMUSCULAR at 10:04

## 2024-04-02 NOTE — PROGRESS NOTES
Subjective:     Patient ID: Mitch Whittaker is a 68 y.o. male.    Chief Complaint: Pain and Swelling of the Left Hand and Pain and Swelling of the Left Elbow      HPI:  The patient is a 68-year-old male status post right carpal tunnel release 12/01/2023 with good results.  He wishes to schedule a left carpal tunnel release.  Additionally he has left elbow lateral epicondylitis and was last injected 01/16/2024 with good results and wishes reinjection today.  He had nerve studies that did show diabetic neuropathy as well as bilateral carpal tunnel syndrome 04/18/2023.    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 RNA NEGATIVE 5/2015, 2021, 2022    History of colon polyps     HPTH (hyperparathyroidism)     Hyperlipidemia     Hypertension associated with stage 3 chronic kidney disease due to type 2 diabetes mellitus     LBBB (left bundle branch block) 12/20/2021    Morbid obesity with BMI of 45.0-49.9, adult     Nephrolithiasis 6/7/2013    PCO (posterior capsular opacification), left 03/04/2019    Proteinuria     resolved s/p transplant    S/P kidney transplant     Sleep apnea     Type 2 diabetes, uncontrolled, with retinopathy     Type II diabetes mellitus with renal manifestations      Past  Surgical History:   Procedure Laterality Date    CARDIAC CATHETERIZATION  2008    normal coronary    CARPAL TUNNEL RELEASE Right 12/1/2023    Procedure: RELEASE, CARPAL TUNNEL;  Surgeon: Noel Almonte MD;  Location: [facility] OR;  Service: Orthopedics;  Laterality: Right;    COLONOSCOPY N/A 4/5/2018    Procedure: COLONOSCOPY;  Surgeon: Chava Ronquillo MD;  Location: [facility] ENDO;  Service: Endoscopy;  Laterality: N/A;    COLONOSCOPY N/A 5/2/2022    Procedure: COLONOSCOPY;  Surgeon: Alix Puente MD;  Location: [facility] ENDO;  Service: Endoscopy;  Laterality: N/A;    COLONOSCOPY N/A 6/7/2023    Procedure: COLONOSCOPY - rule out CMV  Cardiac clearance/Eliquis hold approval received on 05/21/23 per Dr. Meade, cardiology.  Note in encounters.  LB;  Surgeon: Daniella Shah MD;  Location: [facility] ENDO;  Service: Endoscopy;  Laterality: N/A;    ESOPHAGOGASTRODUODENOSCOPY N/A 3/26/2024    Procedure: EGD (ESOPHAGOGASTRODUODENOSCOPY) 3/1-pt cleared, ok to hold eliquis for 3 days;  Surgeon: Daniella Shah MD;  Location: [facility] ENDO;  Service: Endoscopy;  Laterality: N/A;    KIDNEY TRANSPLANT      RETINAL LASER PROCEDURE       Family History   Problem Relation Age of Onset    Diabetes Mother     Hypertension Mother     Heart failure Mother     Kidney disease Sister         ESRD    Diabetes Sister     Diabetes Maternal Grandmother     Cancer Neg Hx      Social History     Socioeconomic History    Marital status:     Number of children: 2   Occupational History    Occupation: retired     Employer: Retired   Tobacco Use    Smoking status: Former     Current packs/day: 0.00     Types: Cigarettes     Quit date: 5/25/2013     Years since quitting: 10.8     Passive exposure: Past    Smokeless tobacco: Former     Quit date: 6/11/2013    Tobacco comments:     used marijuana since 6670-3437, stopped after started dialysis   Substance and Sexual Activity    Alcohol use: No     Alcohol/week: 0.0 standard drinks of alcohol  "   Drug use: No     Comment:      Sexual activity: Never   Social History Narrative    . Lives with spouse. Has 2 children. Patient retired as  for [employer]. He has been washing cars.     Social Determinants of Health     Financial Resource Strain: Low Risk  (10/2/2020)    Overall Financial Resource Strain (CARDIA)     Difficulty of Paying Living Expenses: Not hard at all   Transportation Needs: No Transportation Needs (10/2/2020)    PRAPARE - Transportation     Lack of Transportation (Medical): No     Lack of Transportation (Non-Medical): No   Stress: No Stress Concern Present (10/2/2020)    [employer] [place] [employer] - Occupational Stress Questionnaire     Feeling of Stress : Not at all     Medication List with Changes/Refills   Current Medications    AMITRIPTYLINE (ELAVIL) 25 MG TABLET    Take 1 tablet (25 mg total) by mouth nightly as needed for Insomnia.    APIXABAN (ELIQUIS) 5 MG TAB    Take 1 tablet (5 mg total) by mouth 2 (two) times daily.    ASPIRIN (ECOTRIN) 81 MG EC TABLET    Take 1 tablet by mouth Daily.     BD ULTRA-FINE MINI PEN NEEDLE 31 GAUGE X 3/16" NDLE    Use to inject insulin as needed up to 4 times daily    BLOOD SUGAR DIAGNOSTIC (CONTOUR NEXT TEST STRIPS) STRP    by Misc.(Non-Drug; Combo Route) route 4 (four) times daily before meals and nightly.    BLOOD-GLUCOSE METER (FREESTYLE LITE METER) KIT    1 each 4 (four) times daily.    CALCITRIOL (ROCALTROL) 0.25 MCG CAP    Take 1 capsule (0.25 mcg total) by mouth once daily.    CHLORHEXIDINE (PERIDEX) 0.12 % SOLUTION    Rinse with 1/2  ounce (15 mLs)  twice daily    COVID CDT13-43,12UP,,ANDU,,PF, (SPIKEVAX 8693-8584,12Y UP,,PF,) 50 MCG/0.5 ML INJECTION    Inject into the muscle.    FAMOTIDINE (PEPCID) 20 MG TABLET    TAKE ONE TABLET BY MOUTH EVERY EVENING    FERROUS SULFATE (FEOSOL) 325 MG (65 MG IRON) TAB TABLET    Take 1 tablet (325 mg total) by mouth daily with breakfast.    FISH OIL-OMEGA-3 FATTY ACIDS " 300-1,000 MG CAPSULE    Take 1 g by mouth once daily.    FLUORIDE, SODIUM, (PREVIDENT) 1.1 % GEL    use as directed to brush teeth daily    FUROSEMIDE (LASIX) 80 MG TABLET    Take one-half tablet (40 mg) by mouth 2 (two) times daily.    GABAPENTIN (NEURONTIN) 100 MG CAPSULE    Take 1 capsule (100 mg total) by mouth 3 (three) times daily.    GLIMEPIRIDE (AMARYL) 2 MG TABLET    Take 1 tablet (2 mg total) by mouth before breakfast.    HYDROCODONE-ACETAMINOPHEN (NORCO) 5-325 MG PER TABLET    Take 1 tablet by mouth every 6 (six) hours as needed for Pain.    INSULIN (LANTUS SOLOSTAR U-100 INSULIN) GLARGINE 100 UNITS/ML SUBQ PEN    Inject 35 Units into the skin 2 (two) times daily.    INSULIN ASPART U-100 (NOVOLOG FLEXPEN U-100 INSULIN) 100 UNIT/ML (3 ML) INPN PEN    Inject 25 Units into the skin 3 (three) times daily with meals.    INSULIN GLARGINE (LANTUS U-100 INSULIN) 100 UNIT/ML INJECTION    Inject 35 Units into the skin 2 (two) times a day.    KETOCONAZOLE (NIZORAL) 200 MG TAB    Take HALF A tablet (100 mg total) by mouth once daily.    LANCETS (MICROLET LANCET) MISC    100 lancets by Misc.(Non-Drug; Combo Route) route 4 (four) times daily before meals and nightly.    MAGNESIUM OXIDE (MAG-OX) 400 MG (241.3 MG MAGNESIUM) TABLET    Take 1 tablet (400 mg total) by mouth once daily.    METOPROLOL SUCCINATE (TOPROL-XL) 25 MG 24 HR TABLET    Take 1 tablet (25 mg total) by mouth 2 (two) times daily.    MONTELUKAST (SINGULAIR) 10 MG TABLET    Take 1 tablet (10 mg total) by mouth every evening.    MULTIVITAMIN (THERAGRAN) TABLET    Take 1 tablet by mouth once daily.    MYCOPHENOLATE (CELLCEPT) 250 MG CAP    Take 2 capsules (500 mg total) by mouth 2 (two) times daily.    ONDANSETRON (ZOFRAN) 4 MG TABLET    Take 1 tablet (4 mg total) by mouth every 6 (six) hours.    POTASSIUM CHLORIDE SA (K-DUR,KLOR-CON) 20 MEQ TABLET    Take 2 tablets (40 mEq total) by mouth once daily.    PREDNISONE (DELTASONE) 5 MG TABLET    Take 1 tablet (5  mg total) by mouth once daily.    PROMETHAZINE-DEXTROMETHORPHAN (PROMETHAZINE-DM) 6.25-15 MG/5 ML SYRP    Take 5 mLs by mouth every 6 (six) hours as needed (cough).    ROSUVASTATIN (CRESTOR) 40 MG TAB    Take 1 tablet (40 mg total) by mouth once daily.    SACUBITRIL-VALSARTAN (ENTRESTO)  MG PER TABLET    Take 1 tablet by mouth 2 (two) times daily.    SEMAGLUTIDE (OZEMPIC) 0.25 MG OR 0.5 MG (2 MG/3 ML) PEN INJECTOR    Inject 0.5 mg into the skin every 7 days. Call office in 6 weeks will increase dose    TACROLIMUS (PROGRAF) 1 MG CAP    Take 5 capsules (5 mg) by mouth every morning and 4 capsules (4 mg) by mouth every evening (9 mg per day total).    TAMSULOSIN (FLOMAX) 0.4 MG CAP    Take 1 capsule (0.4 mg total) by mouth once daily.    TRAMADOL (ULTRAM) 50 MG TABLET    Take 1 tablet (50 mg total) by mouth every 4 (four) hours as needed for Pain.    TRIAMCINOLONE ACETONIDE 0.1% (KENALOG) 0.1 % OINTMENT    Apply topically 2 (two) times daily. Use on bilateral lower legs.     Review of patient's allergies indicates:   Allergen Reactions    Lisinopril Other (See Comments)     Other reaction(s):  cough    Actos  [pioglitazone] Other (See Comments)     Other reaction(s): CHF    Metformin Other (See Comments)     Other reaction(s): renal insuff  Other reaction(s): CHF     Review of Systems   Constitutional: Negative for malaise/fatigue.   HENT:  Negative for hearing loss.    Eyes:  Positive for visual disturbance. Negative for double vision.   Cardiovascular:  Positive for chest pain, irregular heartbeat and syncope.   Respiratory:  Positive for sleep disturbances due to breathing. Negative for shortness of breath.    Endocrine: Negative for cold intolerance.   Hematologic/Lymphatic: Does not bruise/bleed easily.   Skin:  Negative for poor wound healing and suspicious lesions.   Musculoskeletal:  Positive for falls and neck pain. Negative for gout, joint pain and joint swelling.   Gastrointestinal:  Positive for  diarrhea. Negative for nausea and vomiting.   Genitourinary:  Negative for dysuria.   Neurological:  Positive for dizziness, numbness, paresthesias and sensory change.   Psychiatric/Behavioral:  Negative for depression, memory loss and substance abuse. The patient is not nervous/anxious.    Allergic/Immunologic: Negative for persistent infections.       Objective:   Body mass index is 44.37 kg/m².  There were no vitals filed for this visit.             General    Constitutional: He is oriented to person, place, and time. He appears well-developed and well-nourished. No distress.   HENT:   Head: Normocephalic.   Mouth/Throat: Oropharynx is clear and moist.   Eyes: EOM are normal.   Cardiovascular:  Normal rate.            Pulmonary/Chest: Effort normal.   Abdominal: Soft.   Neurological: He is alert and oriented to person, place, and time. No cranial nerve deficit.   Psychiatric: He has a normal mood and affect.         Left Hand/Wrist Exam     Pain   Wrist - The patient exhibits pain of the flexor/pronator group.    Tests   Phalens sign: positive  Carpal Tunnel Compression Test: positive      Other     Sensory Exam  Median Distribution: abnormal  Ulnar Distribution: normal  Radial Distribution: normal      Left Elbow Exam     Inspection   Scars: absent  Effusion: absent    Pain   The patient exhibits pain of the lateral epicondyle    Other   Sensation: normal    Comments:  The patient has tenderness well localized left elbow lateral epicondyle with pain on resisted wrist and finger extension.  He has a positive Tinel and positive Phalen sign.  There is no thenar atrophy noted.        Vascular Exam       Capillary Refill  Left Hand: normal capillary refill          Relevant imaging results reviewed and interpreted by me, discussed with the patient and / or family today radiographs left elbow showed lateral epicondyle osteophyte as well as olecranon osteophyte.  Radiographs left hand showed vascular markings in the  arterial tree  Assessment:     Encounter Diagnoses   Name Primary?    Bilateral carpal tunnel syndrome Yes    Lateral epicondylitis of left elbow         Plan:     The patient injected left elbow lateral epicondyle with 1 cc Kenalog and 1 cc 2% plain lidocaine.  He wishes to schedule a left carpal tunnel release.  He was counseled regarding the surgery.  Risk complications and alternatives were discussed including the risk of infection, anesthetic risk, injury to nerves and vessels, loss of motion, and possible need for additional surgeries were discussed.  He is aware he does have an element of diabetic neuropathy that would be unchanged.  He will need to be cleared by his cardiologist.  He seemed to do well with his right carpal tunnel release.  He needs to be off his Eliquis for at least 2 days.                Disclaimer: This note was prepared using a voice recognition system and is likely to have sound alike errors within the text.

## 2024-04-02 NOTE — PROCEDURES
Tendon Origin: L elbow    Date/Time: 4/2/2024 10:30 AM    Performed by: Noel Almonte MD  Authorized by: Noel Almonte MD    Consent Done?:  Yes (Verbal)  Timeout: prior to procedure the correct patient, procedure, and site was verified    Indications:  Pain  Timeout: prior to procedure the correct patient, procedure, and site was verified    Location:  Elbow  Site:  L elbow  Prep: patient was prepped and draped in usual sterile fashion    Ultrasonic Guidance for Needle Placement?: No    Needle size:  25 G  Approach:  Anterolateral  Medications:  40 mg triamcinolone acetonide 40 mg/mL

## 2024-04-02 NOTE — PROCEDURES
Carpal Tunnel    Date/Time: 4/2/2024 10:30 AM    Performed by: Noel Almonte MD  Authorized by: Noel Almonte MD    Consent Done?:  Yes (Verbal)  Indications:  Pain  Timeout: prior to procedure the correct patient, procedure, and site was verified    Prep: patient was prepped and draped in usual sterile fashion      Local anesthesia used?: Yes    Local anesthetic:  Lidocaine 2% without epinephrine  Anesthetic total (ml):  1    Location:  Wrist  Site:  L carpal tunnel  Ultrasonic Guidance for Needle Placement?: No    Needle size:  25 G  Medications:  40 mg triamcinolone acetonide 40 mg/mL

## 2024-04-05 NOTE — TELEPHONE ENCOUNTER
No care due was identified.  [facility] Embedded Care Due Messages. Reference number: [id number].   4/05/2024 11:44:14 AM CDT

## 2024-04-05 NOTE — TELEPHONE ENCOUNTER
Refill Routing Note   Medication(s) are not appropriate for processing by [facility] for the following reason(s):        Drug-disease interaction  ED/Hospital Visit since last OV with provider    [facility] action(s):  Defer        Medication Therapy Plan: tamsulosin and Orthostatic hypotension. Patient has not had OV with PCP >12 months    Pharmacist review requested: Yes   Extended chart review required: Yes     Appointments  past 12m or future 3m with PCP    Date Provider   Last Visit   3/28/2023 Alix Kowalski DO   Next Visit   6/6/2024 Alix Kowalski DO   ED visits in past 90 days: 0        Note composed:4:02 PM 04/05/2024

## 2024-04-05 NOTE — TELEPHONE ENCOUNTER
Refill Routing Note   Medication(s) are not appropriate for processing by [facility] for the following reason(s):        Drug-disease interaction    [facility] action(s):  Defer        Medication Therapy Plan: tamsulosin and Orthostatic hypotension    Pharmacist review requested: Yes     Appointments  past 12m or future 3m with PCP    Date Provider   Last Visit   3/28/2023 Alix Kowalski DO   Next Visit   6/6/2024 Alix Kowalski DO   ED visits in past 90 days: 0        Note composed:3:59 PM 04/05/2024

## 2024-04-09 RX ORDER — TAMSULOSIN HYDROCHLORIDE 0.4 MG/1
1 CAPSULE ORAL DAILY
Qty: 90 CAPSULE | Refills: 0 | OUTPATIENT
Start: 2024-04-09

## 2024-05-01 DIAGNOSIS — R05.8 ALLERGIC COUGH: ICD-10-CM

## 2024-05-01 NOTE — TELEPHONE ENCOUNTER
Patient is past due for his lab work.  It was due today, but he did not show up.  I sent his refill to the pharmacy listed, which was the [facility], [facility] in [place].  I only gave him 1 refill.  He needs to get his labwork done this week and see me at his scheduled appointment next week.   Physical Therapy Daily Treatment Note      Patient: Bhaskar Ibarra   : 1937  Diagnosis/ICD-10 Code:  Balance problem [R26.89]  Referring practitioner: Radha Bowers DO  Date of Initial Visit: Type: THERAPY  Noted: 2024  Today's Date: 2024  Patient seen for 5 sessions         Visit Diagnoses:    ICD-10-CM ICD-9-CM   1. Balance problem  R26.89 781.99   2. Gait disturbance  R26.9 781.2       Subjective   Bhaskar Ibarra reports: Pt reports he feels tired today. Pt states he has been using his exercise bike at home. Pt notices his balance and gait is improving.     Objective     See Exercise, Manual, and Modality Logs for complete treatment.       Assessment/Plan  Pt demonstrated good tolerance to progression of therapeutic exercise/therapeutic activities with frequent seated rest breaks. Pt tolerated addition of half tandem stance activity. Pt will continue to benefit from skilled PT services in order to improve balance, gait/ambulation, and functional activities.   Progress per Plan of Care and Progress strengthening /stabilization /functional activity           Manual Therapy:         mins  43277;  Therapeutic Exercise:    15     mins  42012;     Neuromuscular Sujata:    10    mins  24556;    Therapeutic Activity:     5     mins  09472;     Gait Training:           mins  30988;     Ultrasound:          mins  16178;    Electrical Stimulation:         mins  09949 ( );  Dry Needling          mins self-pay    Timed Treatment:   30   mins   Total Treatment:     50   mins        Tomasa Gillis, Student ASAD       I was present in the PT Department guiding the student by approving, concurring, and confirming the skilled judgement for all services rendered.     Wolf Sanchez PTA  [place] License #[id number]  Physical Therapist Assistant

## 2024-05-01 NOTE — TELEPHONE ENCOUNTER
No care due was identified.  [facility] Embedded Care Due Messages. Reference number: [id number].   5/01/2024 3:23:34 PM CDT

## 2024-05-02 ENCOUNTER — OFFICE VISIT (OUTPATIENT)
Dept: CARDIOLOGY | Facility: CLINIC | Age: 69
End: 2024-05-02
Payer: COMMERCIAL

## 2024-05-02 VITALS
SYSTOLIC BLOOD PRESSURE: 132 MMHG | HEART RATE: 103 BPM | DIASTOLIC BLOOD PRESSURE: 72 MMHG | RESPIRATION RATE: 16 BRPM | WEIGHT: 280.19 LBS | BODY MASS INDEX: 45.03 KG/M2 | HEIGHT: 66 IN | OXYGEN SATURATION: 95 %

## 2024-05-02 DIAGNOSIS — Z01.810 PREOP CARDIOVASCULAR EXAM: Primary | ICD-10-CM

## 2024-05-02 DIAGNOSIS — I44.7 LBBB (LEFT BUNDLE BRANCH BLOCK): ICD-10-CM

## 2024-05-02 DIAGNOSIS — Z86.16 HISTORY OF COVID-19: ICD-10-CM

## 2024-05-02 DIAGNOSIS — Z94.0 KIDNEY TRANSPLANT STATUS, LIVING RELATED DONOR: ICD-10-CM

## 2024-05-02 DIAGNOSIS — Z86.718 HISTORY OF DVT (DEEP VEIN THROMBOSIS): ICD-10-CM

## 2024-05-02 DIAGNOSIS — G47.33 OBSTRUCTIVE SLEEP APNEA: ICD-10-CM

## 2024-05-02 DIAGNOSIS — E11.69 TYPE 2 DIABETES MELLITUS WITH OTHER SPECIFIED COMPLICATION, WITH LONG-TERM CURRENT USE OF INSULIN: ICD-10-CM

## 2024-05-02 DIAGNOSIS — I49.3 PVC'S (PREMATURE VENTRICULAR CONTRACTIONS): ICD-10-CM

## 2024-05-02 DIAGNOSIS — Z79.01 CHRONIC ANTICOAGULATION: ICD-10-CM

## 2024-05-02 DIAGNOSIS — G47.33 OSA (OBSTRUCTIVE SLEEP APNEA): ICD-10-CM

## 2024-05-02 DIAGNOSIS — I25.118 CORONARY ARTERY DISEASE OF NATIVE ARTERY OF NATIVE HEART WITH STABLE ANGINA PECTORIS: ICD-10-CM

## 2024-05-02 DIAGNOSIS — E66.01 MORBID OBESITY WITH BMI OF 40.0-44.9, ADULT: ICD-10-CM

## 2024-05-02 DIAGNOSIS — E11.22 HYPERTENSION ASSOCIATED WITH STAGE 3 CHRONIC KIDNEY DISEASE DUE TO TYPE 2 DIABETES MELLITUS: ICD-10-CM

## 2024-05-02 DIAGNOSIS — I87.2 CHRONIC VENOUS INSUFFICIENCY OF LOWER EXTREMITY: ICD-10-CM

## 2024-05-02 DIAGNOSIS — N18.30 STAGE 3 CHRONIC KIDNEY DISEASE, UNSPECIFIED WHETHER STAGE 3A OR 3B CKD: ICD-10-CM

## 2024-05-02 DIAGNOSIS — Z79.4 TYPE 2 DIABETES MELLITUS WITH OTHER SPECIFIED COMPLICATION, WITH LONG-TERM CURRENT USE OF INSULIN: ICD-10-CM

## 2024-05-02 DIAGNOSIS — I82.592 CHRONIC DEEP VEIN THROMBOSIS (DVT) OF OTHER VEIN OF LEFT LOWER EXTREMITY: ICD-10-CM

## 2024-05-02 DIAGNOSIS — E66.01 CLASS 3 SEVERE OBESITY DUE TO EXCESS CALORIES WITH SERIOUS COMORBIDITY AND BODY MASS INDEX (BMI) OF 40.0 TO 44.9 IN ADULT: ICD-10-CM

## 2024-05-02 DIAGNOSIS — N18.30 HYPERTENSION ASSOCIATED WITH STAGE 3 CHRONIC KIDNEY DISEASE DUE TO TYPE 2 DIABETES MELLITUS: ICD-10-CM

## 2024-05-02 DIAGNOSIS — E66.01 SEVERE OBESITY (BMI >= 40): ICD-10-CM

## 2024-05-02 DIAGNOSIS — I50.42 CHRONIC COMBINED SYSTOLIC AND DIASTOLIC CONGESTIVE HEART FAILURE: ICD-10-CM

## 2024-05-02 DIAGNOSIS — N18.32 STAGE 3B CHRONIC KIDNEY DISEASE: ICD-10-CM

## 2024-05-02 DIAGNOSIS — I12.9 HYPERTENSION ASSOCIATED WITH STAGE 3 CHRONIC KIDNEY DISEASE DUE TO TYPE 2 DIABETES MELLITUS: ICD-10-CM

## 2024-05-02 PROCEDURE — 1159F MED LIST DOCD IN RCRD: CPT | Mod: CPTII,S$GLB,, | Performed by: INTERNAL MEDICINE

## 2024-05-02 PROCEDURE — 3078F DIAST BP <80 MM HG: CPT | Mod: CPTII,S$GLB,, | Performed by: INTERNAL MEDICINE

## 2024-05-02 PROCEDURE — 4010F ACE/ARB THERAPY RXD/TAKEN: CPT | Mod: CPTII,S$GLB,, | Performed by: INTERNAL MEDICINE

## 2024-05-02 PROCEDURE — 1101F PT FALLS ASSESS-DOCD LE1/YR: CPT | Mod: CPTII,S$GLB,, | Performed by: INTERNAL MEDICINE

## 2024-05-02 PROCEDURE — 3008F BODY MASS INDEX DOCD: CPT | Mod: CPTII,S$GLB,, | Performed by: INTERNAL MEDICINE

## 2024-05-02 PROCEDURE — 99999 PR PBB SHADOW E&M-EST. PATIENT-LVL V: CPT | Mod: PBBFAC,,, | Performed by: INTERNAL MEDICINE

## 2024-05-02 PROCEDURE — 3288F FALL RISK ASSESSMENT DOCD: CPT | Mod: CPTII,S$GLB,, | Performed by: INTERNAL MEDICINE

## 2024-05-02 PROCEDURE — G2211 COMPLEX E/M VISIT ADD ON: HCPCS | Mod: S$GLB,,, | Performed by: INTERNAL MEDICINE

## 2024-05-02 PROCEDURE — 3051F HG A1C>EQUAL 7.0%<8.0%: CPT | Mod: CPTII,S$GLB,, | Performed by: INTERNAL MEDICINE

## 2024-05-02 PROCEDURE — 99214 OFFICE O/P EST MOD 30 MIN: CPT | Mod: S$GLB,,, | Performed by: INTERNAL MEDICINE

## 2024-05-02 PROCEDURE — 1160F RVW MEDS BY RX/DR IN RCRD: CPT | Mod: CPTII,S$GLB,, | Performed by: INTERNAL MEDICINE

## 2024-05-02 PROCEDURE — 3075F SYST BP GE 130 - 139MM HG: CPT | Mod: CPTII,S$GLB,, | Performed by: INTERNAL MEDICINE

## 2024-05-02 RX ORDER — SEMAGLUTIDE 1.34 MG/ML
1 INJECTION, SOLUTION SUBCUTANEOUS
Qty: 3 ML | Refills: 3 | Status: SHIPPED | OUTPATIENT
Start: 2024-05-02 | End: 2025-05-02

## 2024-05-02 RX ORDER — TAMSULOSIN HYDROCHLORIDE 0.4 MG/1
1 CAPSULE ORAL DAILY
Qty: 90 CAPSULE | Refills: 0 | Status: SHIPPED | OUTPATIENT
Start: 2024-05-02

## 2024-05-02 RX ORDER — MONTELUKAST SODIUM 10 MG/1
10 TABLET ORAL NIGHTLY
Qty: 90 TABLET | Refills: 0 | Status: SHIPPED | OUTPATIENT
Start: 2024-05-02

## 2024-05-02 NOTE — TELEPHONE ENCOUNTER
Refill Routing Note   Medication(s) are not appropriate for processing by [facility] for the following reason(s):        ED/Hospital Visit since last OV with provider  Drug-disease interaction      [facility] action(s):  Defer             Pharmacist review requested: Yes     Appointments  past 12m or future 3m with PCP    Date Provider   Last Visit   3/28/2023 Alix Kowalski DO   Next Visit   5/1/2024 Alix Kowalski DO   ED visits in past 90 days: 0        Note composed:10:18 AM 05/02/2024

## 2024-05-02 NOTE — TELEPHONE ENCOUNTER
Refill Decision Note   Mitch Panfilo  is requesting a refill authorization.  Brief Assessment and Rationale for Refill:  Approve     Medication Therapy Plan:  ED visit 7/31/23 for renal failure, chest pain, and costochonditis. No changes to therapy and patient followed up with cardio. FOVS with PCP.      Pharmacist review requested: Yes   Extended chart review required: Yes   Comments:     Note composed:10:27 AM 05/02/2024

## 2024-05-02 NOTE — Clinical Note
Pre-OP CV evaluation prior to carpal tunnel surgery with Dr. Almonte.  Moderately elevated periop risk of CV events for moderate risk procedure.. Ok to proceed to the scheduled surgery without further cardiac study. OK to hold Eliquis 2 days before the procedure and resume ASAP postop. Continue Metoprolol and Statin periop  Thanks

## 2024-05-02 NOTE — TELEPHONE ENCOUNTER
No care due was identified.  [facility] Embedded Care Due Messages. Reference number: [id number].   5/01/2024 11:42:58 PM CDT

## 2024-05-02 NOTE — PROGRESS NOTES
Subjective:   Patient ID:  Mitch Whittaker is a 68 y.o. male who presents for cardiac consult of No chief complaint on file.      The patient came in today for cardiac consult of No chief complaint on file.      Mitch Whittaker is a 68 y.o. male with current medical conditions non obs CAD, h/o COVID 19, DVT on Eliquis, Obesity,  HFrEF 45-50%, CKD, DM2, MARION, HTN, HLD, ESRD s/p renal transplant presents for follow up CV eval.     2/22/24  Started Ozempic last visit. BMI 44 - 277 lbs.   He has not lost much weight yet. He had low BP once and improved with fluid/chips.   He has low iron will f/u hemeonc.   ECG - sinus tach , V 101, st TWI - inferolat old      5/2/24  PT will need carpal tunnel surgery with Dr. Almonte.   He is s/p EGD for anemia workup,  A1c is improving.   BP and HR stable. BMI 45 - 280 lbs   He has occ worsening LE edema - told to increase lasix PRN and elevate legs.   He occ eats out.       Conclusion 10/2023         Predominant Rhythm Sinus rhythm Heart rates varied between 55 and 145 BPM with an average of 93 BPM.    There were occasional PVCs totalling 661 and averaging 13.77 per hour. (0.3%)    There were frequent PACs totalling 4131 and averaging 86.06 per hour. (1.6%)    On review, no evidence of afib observed on the recorded rhythm strips.    Results for orders placed during the hospital encounter of 08/22/23    Echo    Interpretation Summary    Left Ventricle: The left ventricle is normal in size. Moderately increased ventricular mass. Moderately increased wall thickness. Normal wall motion. There is low normal systolic function with a visually estimated ejection fraction of 50 - 55%. There is normal diastolic function.    Left Atrium: Left atrium is severely dilated.    Right Ventricle: Normal right ventricular cavity size. Wall thickness is normal. Right ventricle wall motion  is normal. Systolic function is normal.    Right Atrium: Right atrium is dilated.    IVC/SVC: Normal venous  pressure at 3 mmHg.      Results for orders placed during the hospital encounter of 10/10/23    Nuclear Stress - Cardiology Interpreted    Interpretation Summary    Normal myocardial perfusion scan. There is no evidence of myocardial ischemia or infarction.    There is a mild intensity perfusion abnormality in the anteroapical wall of the left ventricle, secondary to breast attenuation.    The gated perfusion images showed an ejection fraction of 41% at rest. The gated perfusion images showed an ejection fraction of 54% post stress.    The ECG portion of the study is uninterpretable due to left bundle branch block.    The patient reported no chest pain during the stress test.    Pt experienced abdominal discomfort post infusion. No chest pain pre, during or post stress test      LE US 12/2019  Age Indeterminate non occlusive thrombus of the Posterior Tibial Vein.  CONCLUSIONS   Technically difficult study.   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 RNA NEGATIVE 5/2015, 2021, 2022    History of colon polyps     HPTH (hyperparathyroidism)     Hyperlipidemia     Hypertension associated with  stage 3 chronic kidney disease due to type 2 diabetes mellitus     LBBB (left bundle branch block) 12/20/2021    Morbid obesity with BMI of 45.0-49.9, adult     Nephrolithiasis 6/7/2013    PCO (posterior capsular opacification), left 03/04/2019    Proteinuria     resolved s/p transplant    S/P kidney transplant     Sleep apnea     Type 2 diabetes, uncontrolled, with retinopathy     Type II diabetes mellitus with renal manifestations        Past Surgical History:   Procedure Laterality Date    CARDIAC CATHETERIZATION  2008    normal coronary    CARPAL TUNNEL RELEASE Right 12/1/2023    Procedure: RELEASE, CARPAL TUNNEL;  Surgeon: Noel Almonte MD;  Location: [facility] OR;  Service: Orthopedics;  Laterality: Right;    COLONOSCOPY N/A 4/5/2018    Procedure: COLONOSCOPY;  Surgeon: Chava Ronquillo MD;  Location: [facility] ENDO;  Service: Endoscopy;  Laterality: N/A;    COLONOSCOPY N/A 5/2/2022    Procedure: COLONOSCOPY;  Surgeon: Alix Puente MD;  Location: [facility] ENDO;  Service: Endoscopy;  Laterality: N/A;    COLONOSCOPY N/A 6/7/2023    Procedure: COLONOSCOPY - rule out CMV  Cardiac clearance/Eliquis hold approval received on 05/21/23 per Dr. Meade, cardiology.  Note in encounters.  LB;  Surgeon: Daniella Shah MD;  Location: [facility] ENDO;  Service: Endoscopy;  Laterality: N/A;    ESOPHAGOGASTRODUODENOSCOPY N/A 3/26/2024    Procedure: EGD (ESOPHAGOGASTRODUODENOSCOPY) 3/1-pt cleared, ok to hold eliquis for 3 days;  Surgeon: Daniella Shah MD;  Location: [facility] ENDO;  Service: Endoscopy;  Laterality: N/A;    KIDNEY TRANSPLANT      RETINAL LASER PROCEDURE         Social History     Tobacco Use    Smoking status: Former     Current packs/day: 0.00     Types: Cigarettes     Quit date: 5/25/2013     Years since quitting: 10.9     Passive exposure: Past    Smokeless tobacco: Former     Quit date: 6/11/2013    Tobacco comments:     used marijuana since [id number], stopped after started dialysis   Substance Use Topics  "   Alcohol use: No     Alcohol/week: 0.0 standard drinks of alcohol    Drug use: No     Comment:         Family History   Problem Relation Name Age of Onset    Diabetes Mother      Hypertension Mother      Heart failure Mother      Kidney disease Sister          ESRD    Diabetes Sister      Diabetes Maternal Grandmother      Cancer Neg Hx         Patient's Medications   New Prescriptions    SEMAGLUTIDE (OZEMPIC) 1 MG/DOSE (4 MG/3 ML)    Inject 1 mg into the skin every 7 days. Call office in 2 months to increase   Previous Medications    AMITRIPTYLINE (ELAVIL) 25 MG TABLET    Take 1 tablet (25 mg total) by mouth nightly as needed for Insomnia.    APIXABAN (ELIQUIS) 5 MG TAB    Take 1 tablet (5 mg total) by mouth 2 (two) times daily.    ASPIRIN (ECOTRIN) 81 MG EC TABLET    Take 1 tablet by mouth Daily.     BD ULTRA-FINE MINI PEN NEEDLE 31 GAUGE X 3/16" NDLE    Use to inject insulin as needed up to 4 times daily    BLOOD SUGAR DIAGNOSTIC (CONTOUR NEXT TEST STRIPS) STRP    by Misc.(Non-Drug; Combo Route) route 4 (four) times daily before meals and nightly.    BLOOD-GLUCOSE METER (FREESTYLE LITE METER) KIT    1 each 4 (four) times daily.    CALCITRIOL (ROCALTROL) 0.25 MCG CAP    Take 1 capsule (0.25 mcg total) by mouth once daily.    CHLORHEXIDINE (PERIDEX) 0.12 % SOLUTION    Rinse with 1/2  ounce (15 mLs)  twice daily    COVID RUR26-00,12UP,,ANDU,,PF, (SPIKEVAX [id number],12Y UP,,PF,) 50 MCG/0.5 ML INJECTION    Inject into the muscle.    FAMOTIDINE (PEPCID) 20 MG TABLET    TAKE ONE TABLET BY MOUTH EVERY EVENING    FERROUS SULFATE (FEOSOL) 325 MG (65 MG IRON) TAB TABLET    Take 1 tablet (325 mg total) by mouth daily with breakfast.    FISH OIL-OMEGA-3 FATTY ACIDS 300-1,000 MG CAPSULE    Take 1 g by mouth once daily.    FLUORIDE, SODIUM, (PREVIDENT) 1.1 % GEL    use as directed to brush teeth daily    FUROSEMIDE (LASIX) 80 MG TABLET    Take one-half tablet (40 mg) by mouth 2 (two) times daily.    GABAPENTIN (NEURONTIN) 100 " MG CAPSULE    Take 1 capsule (100 mg total) by mouth 3 (three) times daily.    GLIMEPIRIDE (AMARYL) 2 MG TABLET    Take 1 tablet (2 mg total) by mouth before breakfast.    HYDROCODONE-ACETAMINOPHEN (NORCO) 5-325 MG PER TABLET    Take 1 tablet by mouth every 6 (six) hours as needed for Pain.    INSULIN (LANTUS SOLOSTAR U-100 INSULIN) GLARGINE 100 UNITS/ML SUBQ PEN    Inject 35 Units into the skin 2 (two) times daily.    INSULIN ASPART U-100 (NOVOLOG FLEXPEN U-100 INSULIN) 100 UNIT/ML (3 ML) INPN PEN    Inject 25 Units into the skin 3 (three) times daily with meals.    INSULIN GLARGINE (LANTUS U-100 INSULIN) 100 UNIT/ML INJECTION    Inject 35 Units into the skin 2 (two) times a day.    KETOCONAZOLE (NIZORAL) 200 MG TAB    Take HALF A tablet (100 mg total) by mouth once daily.    LANCETS (MICROLET LANCET) MISC    100 lancets by Misc.(Non-Drug; Combo Route) route 4 (four) times daily before meals and nightly.    MAGNESIUM OXIDE (MAG-OX) 400 MG (241.3 MG MAGNESIUM) TABLET    Take 1 tablet (400 mg total) by mouth once daily.    METOPROLOL SUCCINATE (TOPROL-XL) 25 MG 24 HR TABLET    Take 1 tablet (25 mg total) by mouth 2 (two) times daily.    MONTELUKAST (SINGULAIR) 10 MG TABLET    Take 1 tablet (10 mg total) by mouth every evening.    MULTIVITAMIN (THERAGRAN) TABLET    Take 1 tablet by mouth once daily.    MYCOPHENOLATE (CELLCEPT) 250 MG CAP    Take 2 capsules (500 mg total) by mouth 2 (two) times daily.    ONDANSETRON (ZOFRAN) 4 MG TABLET    Take 1 tablet (4 mg total) by mouth every 6 (six) hours.    POTASSIUM CHLORIDE SA (K-DUR,KLOR-CON) 20 MEQ TABLET    Take 2 tablets (40 mEq total) by mouth once daily.    PREDNISONE (DELTASONE) 5 MG TABLET    Take 1 tablet (5 mg total) by mouth once daily.    PROMETHAZINE-DEXTROMETHORPHAN (PROMETHAZINE-DM) 6.25-15 MG/5 ML SYRP    Take 5 mLs by mouth every 6 (six) hours as needed (cough).    ROSUVASTATIN (CRESTOR) 40 MG TAB    Take 1 tablet (40 mg total) by mouth once daily.     "SACUBITRIL-VALSARTAN (ENTRESTO)  MG PER TABLET    Take 1 tablet by mouth 2 (two) times daily.    TACROLIMUS (PROGRAF) 1 MG CAP    Take 5 capsules (5 mg) by mouth every morning and 4 capsules (4 mg) by mouth every evening (9 mg per day total).    TAMSULOSIN (FLOMAX) 0.4 MG CAP    Take 1 capsule (0.4 mg total) by mouth once daily.    TRAMADOL (ULTRAM) 50 MG TABLET    Take 1 tablet (50 mg total) by mouth every 4 (four) hours as needed for Pain.    TRIAMCINOLONE ACETONIDE 0.1% (KENALOG) 0.1 % OINTMENT    Apply topically 2 (two) times daily. Use on bilateral lower legs.   Modified Medications    No medications on file   Discontinued Medications    SEMAGLUTIDE (OZEMPIC) 0.25 MG OR 0.5 MG (2 MG/3 ML) PEN INJECTOR    Inject 0.5 mg into the skin every 7 days. Call office in 6 weeks will increase dose       Review of Systems   Constitutional:  Positive for malaise/fatigue.   HENT: Negative.     Eyes: Negative.    Respiratory:  Positive for cough and shortness of breath.    Cardiovascular:  Positive for leg swelling. Negative for chest pain and palpitations.   Gastrointestinal: Negative.    Genitourinary: Negative.    Musculoskeletal:  Positive for back pain.   Skin: Negative.    Neurological:  Positive for dizziness.   Endo/Heme/Allergies: Negative.    Psychiatric/Behavioral: Negative.     All 12 systems otherwise negative.      Wt Readings from Last 3 Encounters:   05/02/24 127.1 kg (280 lb 3.3 oz)   04/02/24 124.7 kg (274 lb 14.6 oz)   03/26/24 124.7 kg (275 lb)     Temp Readings from Last 3 Encounters:   03/26/24 97.9 °F (36.6 °C) (Skin)   03/18/24 97.7 °F (36.5 °C) (Temporal)   02/27/24 98.3 °F (36.8 °C) (Temporal)     BP Readings from Last 3 Encounters:   05/02/24 132/72   03/26/24 (!) 140/58   03/18/24 (!) 147/78     Pulse Readings from Last 3 Encounters:   05/02/24 103   03/26/24 96   03/18/24 96       /72   Pulse 103   Resp 16   Ht 5' 6" (1.676 m)   Wt 127.1 kg (280 lb 3.3 oz)   SpO2 95%   BMI 45.23 " kg/m²     Objective:   Physical Exam  Vitals and nursing note reviewed.   Constitutional:       General: He is not in acute distress.     Appearance: He is well-developed. He is obese. He is not diaphoretic.   HENT:      Head: Normocephalic and atraumatic.      Nose: Nose normal.   Eyes:      General: No scleral icterus.     Conjunctiva/sclera: Conjunctivae normal.   Neck:      Thyroid: No thyromegaly.      Vascular: No JVD.   Cardiovascular:      Rate and Rhythm: Normal rate and regular rhythm.      Heart sounds: S1 normal and S2 normal. Murmur heard.      No friction rub. No gallop. No S3 or S4 sounds.   Pulmonary:      Effort: Pulmonary effort is normal. No respiratory distress.      Breath sounds: Normal breath sounds. No stridor. No wheezing or rales.   Chest:      Chest wall: No tenderness.   Abdominal:      General: Bowel sounds are normal. There is no distension.      Palpations: Abdomen is soft. There is no mass.      Tenderness: There is no abdominal tenderness. There is no rebound.   Genitourinary:     Comments: Deferred  Musculoskeletal:         General: No tenderness or deformity. Normal range of motion.      Cervical back: Normal range of motion and neck supple.      Right lower leg: Edema present.      Left lower leg: Edema present.   Lymphadenopathy:      Cervical: No cervical adenopathy.   Skin:     General: Skin is warm and dry.      Coloration: Skin is not pale.      Findings: No erythema or rash.   Neurological:      Mental Status: He is alert and oriented to person, place, and time.      Motor: No abnormal muscle tone.      Coordination: Coordination normal.   Psychiatric:         Behavior: Behavior normal.         Thought Content: Thought content normal.         Judgment: Judgment normal.         Lab Results   Component Value Date     01/29/2024    K 3.9 01/29/2024     01/29/2024    CO2 26 01/29/2024    BUN 35 (H) 01/29/2024    CREATININE 1.9 (H) 01/29/2024    GLU 41 (LL) 01/29/2024     HGBA1C 7.3 (H) 03/14/2024    MG 2.1 03/03/2022    AST 21 01/29/2024    ALT 30 01/29/2024    ALBUMIN 3.3 (L) 01/29/2024    PROT 6.5 01/29/2024    BILITOT 0.4 01/29/2024    WBC 3.01 (L) 02/06/2024    HGB 11.3 (L) 02/06/2024    HCT 38.5 (L) 02/06/2024    HCT 36 06/12/2015    MCV 88 02/06/2024     (L) 02/06/2024    INR 1.0 06/18/2015    TSH 0.999 03/11/2022    CHOL 201 (H) 12/20/2023    HDL 55 12/20/2023    LDLCALC 128.4 12/20/2023    TRIG 88 12/20/2023     (H) 08/22/2023     Assessment:      1. Preop cardiovascular exam    2. PVC's (premature ventricular contractions)    3. Morbid obesity with BMI of 40.0-44.9, adult    4. Chronic combined systolic and diastolic congestive heart failure    5. Chronic deep vein thrombosis (DVT) of other vein of left lower extremity    6. Coronary artery disease of native artery of native heart with stable angina pectoris    7. Class 3 severe obesity due to excess calories with serious comorbidity and body mass index (BMI) of 40.0 to 44.9 in adult    8. LBBB (left bundle branch block)    9. Obstructive sleep apnea    10. Chronic anticoagulation    11. History of DVT (deep vein thrombosis)    12. Hypertension associated with stage 3 chronic kidney disease due to type 2 diabetes mellitus    13. Stage 3 chronic kidney disease, unspecified whether stage 3a or 3b CKD    14. Living-related donor kidney transplant (daughter) - 6/12/15    15. Chronic venous insufficiency of lower extremity    16. Severe obesity (BMI >= 40)    17. Type 2 diabetes mellitus with other specified complication, with long-term current use of insulin    18. Stage 3b chronic kidney disease    19. History of COVID-19                Plan:    1. Chronic CHF EF 40-45% ; neg for TTR amyloidosis, occ more edema at times   - cont lasix, and extra PRN   - titrate Toprol and Entresto;  - cont low salt diet, fluid restriction  - Nuc stress neg for ischemia 10/2023, EF 40-45% at rest.   - ECHO 10/2023 with low  normal LV function, normal RV function.   - Holter 10/2023 with occ PVCs, freq PACs, AVG HR 93 bpm, neg for Afib.     2. HTN with mild edema, PVCs, PACs - occ low BP   - Holter 10/2023 with occ PVCs, freq PACs, AVG HR 93 bpm, neg for Afib.   - cont meds for afterload reduction   - low salt diet  - rec compression stockings  - stop hydralazine   - cont BB    3. HLD  - cont statin    4. ESRD s/p Transplant with CKD  - f/u with nephro, repeat labs and adjust tx per nephro    5. CAD, non obs  - cont meds  - Nuc stress neg for ischemia 10/2023, EF 40-45% at rest.     6. MARION  -had CPAP - needs CPAP    7. Obesity BMI 44 - 285 lbs --> 41 - 267-->  BMI 43 - 268 lbs.   - cont weight loss with diet/exercise     8. H/o DVT, chronic DVT  - cont Eliquis 5 BID  - non occlusive thrombus in PTV in left but no thrombus in POP vein on left.    9. Dizziness, h/o syncope with headaches  - f/u Neuro   - likely sec to Flomax and Proscar, can discuss with urology  - monitor BP as well    10. DM2 - A1c -9.3 --> 8.0 --> 7.5 --> 6.8 --> 7.1 --> 8.1 --> 7.3  - titrate meds   - cont Ozempic low dose and titrate  - increase to 0.5 mg  --> 1 mg     11. H/O COVID 19  - long covid sx now  - cont supportive tx    12. FE def  - f/u hemeonc , s/p GI workup    13. Pre-OP CV evaluation prior to carpal tunnel surgery with Dr. Almonte.   Moderately elevated periop risk of CV events for moderate risk procedure..  Ok to proceed to the scheduled surgery without further cardiac study.  OK to hold Eliquis 2 days before the procedure and resume ASAP postop.  Continue Metoprolol and Statin periop.    Visit today included increased complexity associated with the care of the episodic problem CHF addressed and managing the longitudinal care of the patient due to the serious and/or complex managed problem(s) .    Thank you for allowing me to participate in this patient's care. Please do not hesitate to contact me with any questions or concerns. Consult note has been  forwarded to the referral physician.     Micah Muhammad MD, [facility]  Cardiovascular Disease  [facility], [place]  [phone] (P)

## 2024-05-02 NOTE — TELEPHONE ENCOUNTER
Refill Decision Note   Mitch Panfilo  is requesting a refill authorization.  Brief Assessment and Rationale for Refill:  Approve     Medication Therapy Plan:  Acute care/admission documentation reviewed. No change in therapy. Ok to approve.      Comments:     Note composed:10:29 AM 05/02/2024

## 2024-05-06 ENCOUNTER — PATIENT MESSAGE (OUTPATIENT)
Dept: PULMONOLOGY | Facility: CLINIC | Age: 69
End: 2024-05-06
Payer: COMMERCIAL

## 2024-05-13 RX ORDER — LANOLIN ALCOHOL/MO/W.PET/CERES
400 CREAM (GRAM) TOPICAL DAILY
Qty: 90 TABLET | Refills: 2 | OUTPATIENT
Start: 2024-05-13

## 2024-05-13 NOTE — TELEPHONE ENCOUNTER
Refill Routing Note   Medication(s) are not appropriate for processing by [facility] for the following reason(s):        Outside of protocol  Responsible provider unclear    [facility] action(s):  Route             Appointments  past 12m or future 3m with PCP    Date Provider   Last Visit   3/28/2023 Alix Kowalski DO   Next Visit   Visit date not found Alix Kowalski DO   ED visits in past 90 days: 0        Note composed:9:04 PM 05/12/2024

## 2024-05-14 NOTE — TELEPHONE ENCOUNTER
Provider Staff:  Please note Refusal of medication.     Action required for this patient.      Requested Prescriptions     Refused Prescriptions Disp Refills    magnesium oxide (MAG-OX) 400 mg (241.3 mg magnesium) tablet 90 tablet 2     Sig: Take 1 tablet (400 mg total) by mouth once daily.     Refused By: ISELA COELHO     Reason for Refusal: Patient needs an appointment      Thanks!  [facility]   Note composed: 05/14/2024 2:08 PM

## 2024-05-28 ENCOUNTER — TELEPHONE (OUTPATIENT)
Dept: TRANSPLANT | Facility: CLINIC | Age: 69
End: 2024-05-28
Payer: COMMERCIAL

## 2024-05-28 RX ORDER — CALCITRIOL 0.25 UG/1
0.25 CAPSULE ORAL DAILY
Qty: 30 CAPSULE | Refills: 11 | Status: SHIPPED | OUTPATIENT
Start: 2024-05-28 | End: 2025-05-28

## 2024-05-28 RX ORDER — CALCITRIOL 0.25 UG/1
0.25 CAPSULE ORAL DAILY
Qty: 30 CAPSULE | Refills: 11 | Status: CANCELLED | OUTPATIENT
Start: 2024-05-28 | End: 2025-05-28

## 2024-05-28 RX ORDER — CALCITRIOL 0.25 UG/1
0.25 CAPSULE ORAL DAILY
Qty: 30 CAPSULE | Refills: 11 | Status: CANCELLED | OUTPATIENT
Start: 2024-05-26 | End: 2025-05-26

## 2024-05-28 NOTE — TELEPHONE ENCOUNTER
Return call to patient and notified that I spoke with Bart at [facility] and RX is ready to be .  Voice a understanding that all future refills  to go trough Dr JC Gonsalez his local Nephrologist.    ----- Message from Sade Shah RN sent at 5/28/2024 10:22 AM CDT -----  Contact: self    ----- Message -----  From: [place]  Sent: 5/28/2024  10:20 AM CDT  To: Maxim Covarrubias Staff    Pt is calling in concerning calcitRIOL (ROCALTROL) 0.25 MCG Cap . Pt states that pharmacy hasn't heard from the provider .. Please call back .[phone]        [facility] [address]  [facility] [place] [address]  Phone: [phone] Fax: [phone]

## 2024-06-05 RX ORDER — GLIMEPIRIDE 2 MG/1
2 TABLET ORAL
Qty: 90 TABLET | Refills: 0 | Status: SHIPPED | OUTPATIENT
Start: 2024-06-05

## 2024-06-05 NOTE — TELEPHONE ENCOUNTER
Refill Routing Note   Medication(s) are not appropriate for processing by [facility] for the following reason(s):      - NO PCP LISTED IN EPIC; ROUTING TO ALIX KOWALSKI AS LAST PRESCRIBING PROVIDER    [facility] action(s):  Route          Medication reconciliation completed: No     Appointments  past 12m or future 3m with PCP    Date Provider   Last Visit   3/28/2023 Alix Kowalski DO   Next Visit   Visit date not found Alix Kowalski DO   ED visits in past 90 days: 0        Note composed:9:17 AM 06/05/2024

## 2024-06-07 RX ORDER — INSULIN ASPART 100 [IU]/ML
25 INJECTION, SOLUTION INTRAVENOUS; SUBCUTANEOUS
Qty: 30 ML | Refills: 0 | Status: SHIPPED | OUTPATIENT
Start: 2024-06-07

## 2024-06-07 NOTE — TELEPHONE ENCOUNTER
Refill Routing Note   Medication(s) are not appropriate for processing by [facility] for the following reason(s):      - NO PCP LISTED IN EPIC; ROUTING TO ALIX KOWALSKI AS LAST PRESCRIBING PROVIDER    [facility] action(s):  Route          Medication reconciliation completed: No     Appointments  past 12m or future 3m with PCP    Date Provider   Last Visit   3/28/2023 Alix Kowalski DO   Next Visit   Visit date not found Alix Kowalski DO   ED visits in past 90 days: 0        Note composed:6:49 AM 06/07/2024

## 2024-06-09 NOTE — TELEPHONE ENCOUNTER
Refill Routing Note   Medication(s) are not appropriate for processing by [facility] for the following reason(s):        Responsible provider unclear    [facility] action(s):  Defer        Medication Therapy Plan: PCP not listed for Pt in Epic.  Deferring to last known prescriber for assessment      Appointments  past 12m or future 3m with PCP    Date Provider   Last Visit   3/28/2023 Alix Kowalski DO   Next Visit   Visit date not found Alix Kowalski DO   ED visits in past 90 days: 0        Note composed:4:20 PM 06/09/2024

## 2024-06-10 RX ORDER — INSULIN GLARGINE 100 [IU]/ML
35 INJECTION, SOLUTION SUBCUTANEOUS 2 TIMES DAILY
Qty: 60 ML | Refills: 0 | Status: SHIPPED | OUTPATIENT
Start: 2024-06-10

## 2024-06-16 DIAGNOSIS — Z94.0 KIDNEY REPLACED BY TRANSPLANT: ICD-10-CM

## 2024-06-17 RX ORDER — PREDNISONE 5 MG/1
5 TABLET ORAL DAILY
Qty: 30 TABLET | Refills: 11 | Status: SHIPPED | OUTPATIENT
Start: 2024-06-17 | End: 2025-06-17

## 2024-06-18 ENCOUNTER — OFFICE VISIT (OUTPATIENT)
Dept: INTERNAL MEDICINE | Facility: CLINIC | Age: 69
End: 2024-06-18
Payer: COMMERCIAL

## 2024-06-18 ENCOUNTER — LAB VISIT (OUTPATIENT)
Dept: LAB | Facility: HOSPITAL | Age: 69
End: 2024-06-18
Attending: NURSE PRACTITIONER
Payer: COMMERCIAL

## 2024-06-18 VITALS
OXYGEN SATURATION: 97 % | SYSTOLIC BLOOD PRESSURE: 116 MMHG | HEART RATE: 96 BPM | DIASTOLIC BLOOD PRESSURE: 70 MMHG | TEMPERATURE: 98 F

## 2024-06-18 DIAGNOSIS — I50.42 CHRONIC COMBINED SYSTOLIC AND DIASTOLIC CONGESTIVE HEART FAILURE: ICD-10-CM

## 2024-06-18 DIAGNOSIS — I12.9 HYPERTENSION ASSOCIATED WITH STAGE 3 CHRONIC KIDNEY DISEASE DUE TO TYPE 2 DIABETES MELLITUS: ICD-10-CM

## 2024-06-18 DIAGNOSIS — E11.22 HYPERTENSION ASSOCIATED WITH STAGE 3 CHRONIC KIDNEY DISEASE DUE TO TYPE 2 DIABETES MELLITUS: ICD-10-CM

## 2024-06-18 DIAGNOSIS — Z94.0 KIDNEY TRANSPLANTED: ICD-10-CM

## 2024-06-18 DIAGNOSIS — Z01.818 PRE-OP TESTING: ICD-10-CM

## 2024-06-18 DIAGNOSIS — G47.33 OBSTRUCTIVE SLEEP APNEA: ICD-10-CM

## 2024-06-18 DIAGNOSIS — D50.0 IRON DEFICIENCY ANEMIA DUE TO CHRONIC BLOOD LOSS: ICD-10-CM

## 2024-06-18 DIAGNOSIS — Z01.818 PREOPERATIVE EXAMINATION: ICD-10-CM

## 2024-06-18 DIAGNOSIS — G56.03 BILATERAL CARPAL TUNNEL SYNDROME: Primary | ICD-10-CM

## 2024-06-18 DIAGNOSIS — E11.42 DIABETIC SENSORIMOTOR POLYNEUROPATHY: ICD-10-CM

## 2024-06-18 DIAGNOSIS — Z29.89 PROPHYLACTIC IMMUNOTHERAPY: Chronic | ICD-10-CM

## 2024-06-18 DIAGNOSIS — Z01.818 PRE-OP TESTING: Primary | ICD-10-CM

## 2024-06-18 DIAGNOSIS — Z79.4 TYPE 2 DIABETES MELLITUS WITH STABLE PROLIFERATIVE RETINOPATHY OF BOTH EYES, WITH LONG-TERM CURRENT USE OF INSULIN: ICD-10-CM

## 2024-06-18 DIAGNOSIS — E11.3553 TYPE 2 DIABETES MELLITUS WITH STABLE PROLIFERATIVE RETINOPATHY OF BOTH EYES, WITH LONG-TERM CURRENT USE OF INSULIN: ICD-10-CM

## 2024-06-18 DIAGNOSIS — I82.492 ACUTE DEEP VEIN THROMBOSIS (DVT) OF OTHER SPECIFIED VEIN OF LEFT LOWER EXTREMITY: ICD-10-CM

## 2024-06-18 DIAGNOSIS — E66.01 MORBID OBESITY WITH BMI OF 40.0-44.9, ADULT: ICD-10-CM

## 2024-06-18 DIAGNOSIS — N18.30 HYPERTENSION ASSOCIATED WITH STAGE 3 CHRONIC KIDNEY DISEASE DUE TO TYPE 2 DIABETES MELLITUS: ICD-10-CM

## 2024-06-18 LAB
ANION GAP SERPL CALC-SCNC: 12 MMOL/L (ref 8–16)
ANISOCYTOSIS BLD QL SMEAR: ABNORMAL
BASOPHILS # BLD AUTO: 0.02 K/UL (ref 0–0.2)
BASOPHILS NFR BLD: 0.5 % (ref 0–1.9)
BUN SERPL-MCNC: 29 MG/DL (ref 8–23)
CALCIUM SERPL-MCNC: 9.5 MG/DL (ref 8.7–10.5)
CHLORIDE SERPL-SCNC: 104 MMOL/L (ref 95–110)
CO2 SERPL-SCNC: 26 MMOL/L (ref 23–29)
CREAT SERPL-MCNC: 2.5 MG/DL (ref 0.5–1.4)
DIFFERENTIAL METHOD BLD: ABNORMAL
EOSINOPHIL # BLD AUTO: 0 K/UL (ref 0–0.5)
EOSINOPHIL NFR BLD: 0.3 % (ref 0–8)
ERYTHROCYTE [DISTWIDTH] IN BLOOD BY AUTOMATED COUNT: 13.5 % (ref 11.5–14.5)
EST. GFR  (NO RACE VARIABLE): 27 ML/MIN/1.73 M^2
ESTIMATED AVG GLUCOSE: 166 MG/DL (ref 68–131)
GLUCOSE SERPL-MCNC: 275 MG/DL (ref 70–110)
HBA1C MFR BLD: 7.4 % (ref 4–5.6)
HCT VFR BLD AUTO: 37.5 % (ref 40–54)
HGB BLD-MCNC: 11.2 G/DL (ref 14–18)
IMM GRANULOCYTES # BLD AUTO: 0.2 K/UL (ref 0–0.04)
IMM GRANULOCYTES NFR BLD AUTO: 5.2 % (ref 0–0.5)
LYMPHOCYTES # BLD AUTO: 0.9 K/UL (ref 1–4.8)
LYMPHOCYTES NFR BLD: 23 % (ref 18–48)
MCH RBC QN AUTO: 26 PG (ref 27–31)
MCHC RBC AUTO-ENTMCNC: 29.9 G/DL (ref 32–36)
MCV RBC AUTO: 87 FL (ref 82–98)
MONOCYTES # BLD AUTO: 0.6 K/UL (ref 0.3–1)
MONOCYTES NFR BLD: 14.7 % (ref 4–15)
NEUTROPHILS # BLD AUTO: 2.2 K/UL (ref 1.8–7.7)
NEUTROPHILS NFR BLD: 56.3 % (ref 38–73)
NRBC BLD-RTO: 0 /100 WBC
PLATELET # BLD AUTO: 183 K/UL (ref 150–450)
PLATELET BLD QL SMEAR: ABNORMAL
PMV BLD AUTO: 11 FL (ref 9.2–12.9)
POIKILOCYTOSIS BLD QL SMEAR: ABNORMAL
POLYCHROMASIA BLD QL SMEAR: ABNORMAL
POTASSIUM SERPL-SCNC: 4.4 MMOL/L (ref 3.5–5.1)
RBC # BLD AUTO: 4.3 M/UL (ref 4.6–6.2)
SODIUM SERPL-SCNC: 142 MMOL/L (ref 136–145)
WBC # BLD AUTO: 3.87 K/UL (ref 3.9–12.7)

## 2024-06-18 PROCEDURE — 83036 HEMOGLOBIN GLYCOSYLATED A1C: CPT | Performed by: NURSE PRACTITIONER

## 2024-06-18 PROCEDURE — 85027 COMPLETE CBC AUTOMATED: CPT | Performed by: NURSE PRACTITIONER

## 2024-06-18 PROCEDURE — 99999 PR PBB SHADOW E&M-EST. PATIENT-LVL V: CPT | Mod: PBBFAC,,,

## 2024-06-18 PROCEDURE — 36415 COLL VENOUS BLD VENIPUNCTURE: CPT | Performed by: NURSE PRACTITIONER

## 2024-06-18 PROCEDURE — 80048 BASIC METABOLIC PNL TOTAL CA: CPT | Performed by: NURSE PRACTITIONER

## 2024-06-18 NOTE — ASSESSMENT & PLAN NOTE
-takes metoprolol, take the morning of surgery  -Cr 2.5 eGFR 22.7, (baseline Cr 1.9-2.3)  -s/p renal transplantation 2015

## 2024-06-18 NOTE — ASSESSMENT & PLAN NOTE
Known risk factors for perioperative complications: Congestive heart failure  Diabetes mellitus    Difficulty with intubation is not anticipated.    Cardiac Risk Estimation: Based on the Revised Cardiac Risk index, patient is a Class risk II with a 6.0% risk of a major cardiac event in a low risk procedure.    1.) Preoperative workup as follows: chest x-ray, hemoglobin, hematocrit, electrolytes, creatinine, glucose.  2.) Change in medication regimen before surgery: discontinue ASA 6 days before surgery, discontinue NSAIDs 5 days before surgery, hold Metformin 24 hours prior to surgery. Hold all vitamins/supplements for 7 days prior to surgery, with the exception of Potassium and Iron supplementation. Hold semaglutide/tirzepatide for 7 days prior to surgery.   3.) Prophylaxis for cardiac events with perioperative beta-blockers: not indicated.  4.) Invasive hemodynamic monitoring perioperatively: at the discretion of anesthesiologist.  5.) Deep vein thrombosis prophylaxis postoperatively: regimen to be chosen by surgical team.  6.) Surveillance for postoperative MI with ECG immediately postoperatively and on postoperati ve days 1 and 2 AND troponin levels 24 hours postoperatively and on day 4 or hospital discharge (whichever comes first): at the discretion of anesthesiologist.  7.) Current medications which may produce withdrawal symptoms if withheld perioperatively: none  8.) Other measures: Careful attention to perioperative glycemic control (IDDM).  Postoperative hypertension management with IV hydralazine until able to take oral medications.

## 2024-06-18 NOTE — ASSESSMENT & PLAN NOTE
-renal transplant in 7/2015  -e-consult sent to transplant team for clearance  -will need to hold Cellcept morning of surgery, may take Prograf morning of surgery

## 2024-06-18 NOTE — ASSESSMENT & PLAN NOTE
-h/o DVT, takes Eliquis BID  -surgical clearance obtained by Dr. Muhammad 6/2/24, hold Eliquis 2 days prior to surgery

## 2024-06-18 NOTE — PRE-PROCEDURE INSTRUCTIONS
To confirm, Surgery is scheduled on 7/18/24. We will call you after 2pm the day before Surgery with your arrival time.    *Please report to the [facility] (1st Floor) located off of [facility] (2nd Entrance/Building on the left, in front of the flag pole).  Address: [address] [place] [address] [place]. [address]      !!!INSTRUCTIONS IMPORTANT!!!  DO NOT Eat, Drink, or Smoke after 12 midnight unless instructed otherwise by your Surgeon. OK to brush teeth, no gum, candy or mints!    MORNING OF SURGERY, drink small sip of water with the following medications instructed by Surgery Pre-Admit Provider:  FAMOTIDINE  GABAPENTIN  METOPROLOL  TACROLIMUS    *Additional Medication Instructions:   >>>HOLD LANTUS NIGHT PRIOR TO SURGERY AND MORNING OF SURGERY<<<<    >>>HOLD ELIQUIS 2 DAYS PRIOR TO SURGERY-LAST DOSE 7/15/24<<<    >>>HOLD OZEMPIC 7 DAYS PRIOR TO SURGERY-LAST DOSE 7/09/24<<<    >>>HOLD ASPIRIN 7 DAYS PRIOR TO SURGERY-LAST DOSE 7/09/24<<<    Diabetic/Prediabetic Patients: If you take diabetic or weight loss medication, Do NOT take morning of surgery unless instructed by Doctor. Metformin to be stopped 24 hrs prior to surgery. Ozempic/ Mounjaro/ Wegovy/ Trulicity/ Semaglutide or any weight loss injections to be stopped 7 days prior to surgery. DO NOT take long-acting insulin the evening before surgery. Blood sugars will be checked in pre-op by Nurse.    !!!STOP ALL Aspirins, NSAIDS, WEIGHT LOSS INJECTIONS/PILLS, Herbal supplements, & Vitamins 7 DAYS BEFORE SURGERY!!!    ____  Avoid Alcoholic beverages 3 days prior to surgery, as it can thin the blood.  ____  NO Acrylic/fake nails or nail polish worn day of surgery (specifically hand/arm & foot surgeries).  ____  NO powder, lotions, deodorants, oils or cream on body.  ____  Remove all jewelry, piercings, & foreign objects prior to arrival and leave at home.  ____  Remove Dentures, Hearing Aids & Contact Lens prior to surgery.  ____  Bring photo ID and  insurance information to hospital (Leave Valuables at Home).  ____  If going home the same day, arrange for a ride home. You will not be able to drive for 24 hrs if Anesthesia was used.   ____  Females (ages 11-60): may need to give a urine sample the morning of surgery; please see Pre op Nurse prior to using the restroom.  ____  Males: Stop ED medications (Viagra, Cialis) 24 hrs prior to surgery.  ____  Wear clean, loose fitting clothing to allow for dressings/ bandages.      Bathing Instructions:    -Shower with anti-bacterial Soap (Hibiclens or Dial) the night before surgery & the morning of surgery!   -Do not use Hibiclens soap on your face or genitals.   -Apply clean clothes after shower.  -Do not shave your face or body 2 days prior to surgery unless instructed otherwise by your Surgeon.  -Do not shave pubic hair 7 days prior to surgery (gyn pt's).    [facility]:  Only 2 adults allowed in pre op/recovery area during your procedure. You MUST HAVE A RIDE HOME from a responsible adult that you know and trust. Medical Transport, Uber or Lyft can ONLY be used if patient has a responsible adult to accompany them during ride home.       *Signs and symptoms of Infection Before or After Surgery:               !!!If you experience any fever, chills, nausea/ vomiting, foul odor/ excessive drainage from surgical site, flu-like symptoms, new wounds or cuts, PLEASE CALL THE SURGEON OFFICE at [phone] or SEND MESSAGE THROUGH [employer] PORTAL!!!       *If you are running late day of surgery, please call the Surgery Dept @ [phone].    *Billing question, please call:  [phone] or [phone]       Thank you,  -[facility].  [phone] or [phone]  M-F 7:30 am-4:00 pm (Closed Major Holidays)    Additional Tests Scheduled Today:  LABS (1ST Floor) Check in at the !

## 2024-06-18 NOTE — DISCHARGE INSTRUCTIONS
Pre op instructions reviewed with patient face to face during Clinic Visit with Provider, verbalized understanding.    To confirm, Surgery is scheduled on 7/18/24. We will call you after 2pm the day before Surgery with your arrival time.    *Please report to the [facility] (1st Floor) located off of [facility] (2nd Entrance/Building on the left, in front of the flag pole).  Address: [address] [place] [address] [place]. [address]      !!!INSTRUCTIONS IMPORTANT!!!  DO NOT Eat, Drink, or Smoke after 12 midnight unless instructed otherwise by your Surgeon. OK to brush teeth, no gum, candy or mints!    MORNING OF SURGERY, drink small sip of water with the following medications instructed by Surgery Pre-Admit Provider:  FAMOTIDINE  GABAPENTIN  METOPROLOL  TACROLIMUS    *Additional Medication Instructions:   >>>HOLD LANTUS NIGHT PRIOR TO SURGERY AND MORNING OF SURGERY<<<<    >>>HOLD ELIQUIS 2 DAYS PRIOR TO SURGERY-LAST DOSE 7/15/24<<<    >>>HOLD OZEMPIC 7 DAYS PRIOR TO SURGERY-LAST DOSE 7/09/24<<<    >>>HOLD ASPIRIN 7 DAYS PRIOR TO SURGERY-LAST DOSE 7/09/24<<<    Diabetic/Prediabetic Patients: If you take diabetic or weight loss medication, Do NOT take morning of surgery unless instructed by Doctor. Metformin to be stopped 24 hrs prior to surgery. Ozempic/ Mounjaro/ Wegovy/ Trulicity/ Semaglutide or any weight loss injections to be stopped 7 days prior to surgery. DO NOT take long-acting insulin the evening before surgery. Blood sugars will be checked in pre-op by Nurse.    !!!STOP ALL Aspirins, NSAIDS, WEIGHT LOSS INJECTIONS/PILLS, Herbal supplements, & Vitamins 7 DAYS BEFORE SURGERY!!!    ____  Avoid Alcoholic beverages 3 days prior to surgery, as it can thin the blood.  ____  NO Acrylic/fake nails or nail polish worn day of surgery (specifically hand/arm & foot surgeries).  ____  NO powder, lotions, deodorants, oils or cream on body.  ____  Remove all jewelry, piercings, & foreign objects prior to arrival  and leave at home.  ____  Remove Dentures, Hearing Aids & Contact Lens prior to surgery.  ____  Bring photo ID and insurance information to hospital (Leave Valuables at Home).  ____  If going home the same day, arrange for a ride home. You will not be able to drive for 24 hrs if Anesthesia was used.   ____  Females (ages 11-60): may need to give a urine sample the morning of surgery; please see Pre op Nurse prior to using the restroom.  ____  Males: Stop ED medications (Viagra, Cialis) 24 hrs prior to surgery.  ____  Wear clean, loose fitting clothing to allow for dressings/ bandages.      Bathing Instructions:    -Shower with anti-bacterial Soap (Hibiclens or Dial) the night before surgery & the morning of surgery!   -Do not use Hibiclens soap on your face or genitals.   -Apply clean clothes after shower.  -Do not shave your face or body 2 days prior to surgery unless instructed otherwise by your Surgeon.  -Do not shave pubic hair 7 days prior to surgery (gyn pt's).    [facility]:  Only 2 adults allowed in pre op/recovery area during your procedure. You MUST HAVE A RIDE HOME from a responsible adult that you know and trust. Medical Transport, Uber or Lyft can ONLY be used if patient has a responsible adult to accompany them during ride home.       *Signs and symptoms of Infection Before or After Surgery:               !!!If you experience any fever, chills, nausea/ vomiting, foul odor/ excessive drainage from surgical site, flu-like symptoms, new wounds or cuts, PLEASE CALL THE SURGEON OFFICE at [phone] or SEND MESSAGE THROUGH [employer] PORTAL!!!       *If you are running late day of surgery, please call the Surgery Dept @ [phone].    *Billing question, please call:  [phone] or [phone]       Thank you,  -[facility]  [phone] or [phone]  M-F 7:30 am-4:00 pm (Closed Major Holidays)    Additional Tests Scheduled Today:  LABS (1ST Floor) Check in at  the !

## 2024-06-18 NOTE — ASSESSMENT & PLAN NOTE
-was recently started on Ozempic, takes on Tuesdays, instructed to hold for 7 days prior to surgery, last dose to be on July 9, 2024
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
## 2024-06-18 NOTE — ASSESSMENT & PLAN NOTE
-last Hgb A1C 7.3%, repeat pending today  -hold Lantus, Novolog the night before surgery  -hold Amaryl the morning of surgery

## 2024-06-19 ENCOUNTER — PATIENT MESSAGE (OUTPATIENT)
Dept: INTERNAL MEDICINE | Facility: CLINIC | Age: 69
End: 2024-06-19
Payer: COMMERCIAL

## 2024-06-24 ENCOUNTER — OFFICE VISIT (OUTPATIENT)
Dept: NEPHROLOGY | Facility: CLINIC | Age: 69
End: 2024-06-24
Payer: COMMERCIAL

## 2024-06-24 VITALS
BODY MASS INDEX: 44.22 KG/M2 | SYSTOLIC BLOOD PRESSURE: 98 MMHG | WEIGHT: 275.13 LBS | RESPIRATION RATE: 18 BRPM | HEART RATE: 95 BPM | DIASTOLIC BLOOD PRESSURE: 60 MMHG | HEIGHT: 66 IN

## 2024-06-24 DIAGNOSIS — Z94.0 KIDNEY TRANSPLANTED: ICD-10-CM

## 2024-06-24 DIAGNOSIS — I50.42 CHRONIC COMBINED SYSTOLIC AND DIASTOLIC CONGESTIVE HEART FAILURE: ICD-10-CM

## 2024-06-24 DIAGNOSIS — N18.4 CKD (CHRONIC KIDNEY DISEASE) STAGE 4, GFR 15-29 ML/MIN: Primary | ICD-10-CM

## 2024-06-24 PROCEDURE — 3074F SYST BP LT 130 MM HG: CPT | Mod: CPTII,S$GLB,, | Performed by: INTERNAL MEDICINE

## 2024-06-24 PROCEDURE — 3078F DIAST BP <80 MM HG: CPT | Mod: CPTII,S$GLB,, | Performed by: INTERNAL MEDICINE

## 2024-06-24 PROCEDURE — 1159F MED LIST DOCD IN RCRD: CPT | Mod: CPTII,S$GLB,, | Performed by: INTERNAL MEDICINE

## 2024-06-24 PROCEDURE — 1126F AMNT PAIN NOTED NONE PRSNT: CPT | Mod: CPTII,S$GLB,, | Performed by: INTERNAL MEDICINE

## 2024-06-24 PROCEDURE — 3066F NEPHROPATHY DOC TX: CPT | Mod: CPTII,S$GLB,, | Performed by: INTERNAL MEDICINE

## 2024-06-24 PROCEDURE — 99215 OFFICE O/P EST HI 40 MIN: CPT | Mod: S$GLB,,, | Performed by: INTERNAL MEDICINE

## 2024-06-24 PROCEDURE — 99999 PR PBB SHADOW E&M-EST. PATIENT-LVL V: CPT | Mod: PBBFAC,,, | Performed by: INTERNAL MEDICINE

## 2024-06-24 PROCEDURE — 3051F HG A1C>EQUAL 7.0%<8.0%: CPT | Mod: CPTII,S$GLB,, | Performed by: INTERNAL MEDICINE

## 2024-06-24 PROCEDURE — 3008F BODY MASS INDEX DOCD: CPT | Mod: CPTII,S$GLB,, | Performed by: INTERNAL MEDICINE

## 2024-06-24 PROCEDURE — 3288F FALL RISK ASSESSMENT DOCD: CPT | Mod: CPTII,S$GLB,, | Performed by: INTERNAL MEDICINE

## 2024-06-24 PROCEDURE — 4010F ACE/ARB THERAPY RXD/TAKEN: CPT | Mod: CPTII,S$GLB,, | Performed by: INTERNAL MEDICINE

## 2024-06-24 PROCEDURE — 1101F PT FALLS ASSESS-DOCD LE1/YR: CPT | Mod: CPTII,S$GLB,, | Performed by: INTERNAL MEDICINE

## 2024-06-24 RX ORDER — METOPROLOL SUCCINATE 25 MG/1
25 TABLET, EXTENDED RELEASE ORAL DAILY
Qty: 90 TABLET | Refills: 3 | Status: SHIPPED | OUTPATIENT
Start: 2024-06-24 | End: 2025-06-09

## 2024-06-24 NOTE — PROGRESS NOTES
Renal clinic f/u note:  Date of clinic visit: 6/24/24  Reason for f/u and chief c/o: h/o of kidney transplant. CHF, CKD.      HPI: Pt is a 69 y/o male with h/o of living related kidney transplant from his daughter in 2015 who presents for f/u. 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 He was last seen in renal clinic in Oct 2023. Chart was reviewed. He was seen by transplant in [place] since then. Transplant clinic notes were reviewed. Labs, meds, immunosuppressive meds and doses reviewed with pt.     On f/u today, pt has no new c/o's, no new issus. Pt denies having any ongoing or persistent GI issues, but says he had small diarrhea this am, no abdominal pain. Regarding fluid status, pt has large leg swelling, worse, but denies SOB. Pt says he is not restricting fluids.         To review, pt was admitted to [facility] for ANGELITO (Cr 4.3) and diarrhea in Dec 2022. The cause for the latter was not clear. He did not have colonoscopy. CMV titer by PCR was positive and he was presumably treated for CMV colitis, inially with ganciclovir, then valcyte x 21 days on discharge. Diarrhea resolved right away after starting the anti-viral meds. Pt was d/koki on valcyte and took it x 21 days. CMV titer turned positive again in April 2023. Valcyte was re-started with renal dose adjustment. Pt has been taking it. Pt is now s/p colonoscopy on 6/7/23, which found no sign of viral induced inflammation. Valcyte was stopped in July 2023.     Pt also has a h/o of CHF. Pt denies SOB, has leg swelling. Noted lasix was recently increased by cardiology from 40 mg bid to 80 mg am and 40 mg pm. Noted pt has mild hypotension, denies lightheadedness.         PAST MEDICAL HISTORY: living related kidney transplant form daughter 6/15/2015 (on HD pre-transplant x 2.5 years), CKD stage 3 to 4, DM-2, HTN, CAD (coronary artery disease), combined diastolic and systolic (EF 40%) CHF, Chronic venous  "insufficiency of lower extremity, Diabetic retinopathy, Gallbladder polyp, 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, History of colon polyps, HPTH (hyperparathyroidism), Hyperlipidemia, LBBB (left bundle branch block) (12/20/2021), Morbid obesity with BMI of 45.0-49.9, adult, PCO (posterior capsular opacification), left (3/4/2019), Sleep apnea,     PAST SURGICAL HISTORY:  He  has a past surgical history that includes Retinal laser procedure; Cardiac catheterization (2008); Kidney transplant; and Colonoscopy (N/A, 4/5/2018).     SOCIAL HISTORY:  He  reports that he quit smoking about 8 years ago. He quit smokeless tobacco use about 8 years ago. He reports that he does not drink alcohol and does not use drugs.     FAMILY MEDICAL HISTORY:  His family history includes Diabetes in his maternal grandmother, mother, and sister; Heart failure in his mother; Hypertension in his mother; Kidney disease in his sister.               Review of patient's allergies indicates:   Allergen Reactions    Lisinopril Other (See Comments)       Other reaction(s):  cough    Actos  [pioglitazone]         Other reaction(s): chf    Metformin         Other reaction(s): renal insuff  Other reaction(s): chf      Meds reviewed    Current Outpatient Medications:     apixaban (ELIQUIS) 5 mg Tab, Take 1 tablet (5 mg total) by mouth 2 (two) times daily., Disp: 180 tablet, Rfl: 1    aspirin (ECOTRIN) 81 MG EC tablet, Take 1 tablet by mouth Daily. , Disp: , Rfl:     BD ULTRA-FINE MINI PEN NEEDLE 31 gauge x 3/16" Ndle, Use to inject insulin as needed up to 4 times daily, Disp: 100 each, Rfl: 5    blood sugar diagnostic (CONTOUR NEXT TEST STRIPS) Strp, by Misc.(Non-Drug; Combo Route) route 4 (four) times daily before meals and nightly., Disp: 100 each, Rfl: 1    blood-glucose meter (FREESTYLE LITE METER) kit, 1 each 4 (four) times daily., Disp: 1 each, Rfl: 0    calcitRIOL (ROCALTROL) 0.25 MCG Cap, Take 1 capsule (0.25 mcg total) by mouth once daily., Disp: " 30 capsule, Rfl: 11    COVID [id number],,andu,,PF, (SPIKEVAX 0804-2115,12Y UP,,PF,) 50 mcg/0.5 mL injection, Inject into the muscle., Disp: 0.5 mL, Rfl: 0    famotidine (PEPCID) 20 MG tablet, TAKE ONE TABLET BY MOUTH EVERY EVENING, Disp: 30 tablet, Rfl: 11    ferrous sulfate (FEOSOL) 325 mg (65 mg iron) Tab tablet, Take 1 tablet (325 mg total) by mouth daily with breakfast., Disp: 30 tablet, Rfl: 11    fish oil-omega-3 fatty acids 300-1,000 mg capsule, Take 1 g by mouth once daily., Disp: , Rfl:     furosemide (LASIX) 80 MG tablet, Take one-half tablet (40 mg) by mouth 2 (two) times daily., Disp: 30 tablet, Rfl: 11    glimepiride (AMARYL) 2 MG tablet, Take 1 tablet (2 mg total) by mouth before breakfast., Disp: 90 tablet, Rfl: 0    insulin aspart U-100 (NOVOLOG FLEXPEN U-100 INSULIN) 100 unit/mL (3 mL) InPn pen, Inject 25 Units into the skin 3 (three) times daily with meals., Disp: 30 mL, Rfl: 0    insulin glargine U-100, Lantus, (LANTUS SOLOSTAR U-100 INSULIN) 100 unit/mL (3 mL) InPn pen, Inject 35 Units into the skin 2 (two) times daily., Disp: 60 mL, Rfl: 0    ketoconazole (NIZORAL) 200 mg Tab, Take HALF A tablet (100 mg total) by mouth once daily., Disp: 15 tablet, Rfl: 9    lancets (MICROLET LANCET) Misc, 100 lancets by Misc.(Non-Drug; Combo Route) route 4 (four) times daily before meals and nightly., Disp: 100 each, Rfl: 11    magnesium oxide (MAG-OX) 400 mg (241.3 mg magnesium) tablet, Take 1 tablet (400 mg total) by mouth once daily., Disp: 90 tablet, Rfl: 2    montelukast (SINGULAIR) 10 mg tablet, Take 1 tablet (10 mg total) by mouth every evening., Disp: 90 tablet, Rfl: 0    multivitamin (THERAGRAN) tablet, Take 1 tablet by mouth once daily., Disp: , Rfl:     mycophenolate (CELLCEPT) 250 mg Cap, Take 2 capsules (500 mg total) by mouth 2 (two) times daily., Disp: 120 capsule, Rfl: 11    potassium chloride SA (K-DUR,KLOR-CON) 20 MEQ tablet, Take 2 tablets (40 mEq total) by mouth once daily., Disp: 180  tablet, Rfl: 1    predniSONE (DELTASONE) 5 MG tablet, Take 1 tablet (5 mg total) by mouth once daily., Disp: 30 tablet, Rfl: 11    rosuvastatin (CRESTOR) 40 MG Tab, Take 1 tablet (40 mg total) by mouth once daily., Disp: 90 tablet, Rfl: 0    sacubitriL-valsartan (ENTRESTO)  mg per tablet, Take 1 tablet by mouth 2 (two) times daily., Disp: 180 tablet, Rfl: 1    semaglutide (OZEMPIC) 1 mg/dose (4 mg/3 mL), Inject 1 mg into the skin every 7 days. Call office in 2 months to increase, Disp: 3 mL, Rfl: 3    tacrolimus (PROGRAF) 1 MG Cap, Take 5 capsules (5 mg) by mouth every morning and 4 capsules (4 mg) by mouth every evening (9 mg per day total)., Disp: 270 capsule, Rfl: 11    tamsulosin (FLOMAX) 0.4 mg Cap, Take 1 capsule (0.4 mg total) by mouth once daily., Disp: 90 capsule, Rfl: 0    amitriptyline (ELAVIL) 25 MG tablet, Take 1 tablet (25 mg total) by mouth nightly as needed for Insomnia. (Patient not taking: Reported on 6/24/2024), Disp: 30 tablet, Rfl: 2    fluoride, sodium, (PREVIDENT) 1.1 % Gel, use as directed to brush teeth daily (Patient not taking: Reported on 6/24/2024), Disp: 56 g, Rfl: 2    gabapentin (NEURONTIN) 100 MG capsule, Take 1 capsule (100 mg total) by mouth 3 (three) times daily. (Patient not taking: Reported on 6/24/2024), Disp: 90 capsule, Rfl: 11    insulin glargine (LANTUS U-100 INSULIN) 100 unit/mL injection, Inject 35 Units into the skin 2 (two) times a day. (Patient not taking: Reported on 6/24/2024), Disp: 60 mL, Rfl: 0    metoprolol succinate (TOPROL-XL) 25 MG po bid    ondansetron (ZOFRAN) 4 MG tablet, Take 1 tablet (4 mg total) by mouth every 6 (six) hours. (Patient not taking: Reported on 6/24/2024), Disp: 30 tablet, Rfl: 0    promethazine-dextromethorphan (PROMETHAZINE-DM) 6.25-15 mg/5 mL Syrp, Take 5 mLs by mouth every 6 (six) hours as needed (cough). (Patient not taking: Reported on 6/24/2024), Disp: 180 mL, Rfl: 0    traMADoL (ULTRAM) 50 mg tablet, Take 1 tablet (50 mg total)  by mouth every 4 (four) hours as needed for Pain. (Patient not taking: Reported on 6/24/2024), Disp: 30 tablet, Rfl: 0    triamcinolone acetonide 0.1% (KENALOG) 0.1 % ointment, Apply topically 2 (two) times daily. Use on bilateral lower legs. (Patient not taking: Reported on 6/24/2024), Disp: 454 g, Rfl: 1       REVIEW OF SYSTEMS:  Patient has no fever, fatigue, visual changes, chest pain, edema, cough, dyspnea, nausea, vomiting, constipation, diarrhea, arthralgias, pruritis, dizziness, weakness, depression, confusion.     PHYSICAL EXAM:  Blood pressure 98/60, pulse 90, resp. rate 20, weight 275 lbs, from 261 lbs, from 279 lbs, from 271 lbs, from 287 lbs  Gen:    WDWN male in no apparent distress  Psych: Normal mood and affect  Skin:    No rashes or ulcers  Neck:   No JVD  Chest:  Clear with no rales, rhonchi, wheezing with normal effort  CV:      Regular with no murmurs, gallops or rubs  Abd:     Soft, nontender, no distension  Ext:      3+ edema       Labs reviewed   BMP  Lab Results   Component Value Date     06/18/2024    K 4.4 06/18/2024     06/18/2024    CO2 26 06/18/2024    BUN 29 (H) 06/18/2024    CREATININE 2.5 (H) 06/18/2024    CALCIUM 9.5 06/18/2024    ANIONGAP 12 06/18/2024    EGFRNORACEVR 27 (A) 06/18/2024     Lab Results   Component Value Date    WBC 3.87 (L) 06/18/2024    HGB 11.2 (L) 06/18/2024    HCT 37.5 (L) 06/18/2024    MCV 87 06/18/2024     06/18/2024       Lab Results   Component Value Date    .9 (H) 11/06/2023    CALCIUM 9.5 06/18/2024    PHOS 3.2 12/20/2023             Prograf level pending           Cardiac study: 1/18/22 reviewed:  Conclusion  Planar imaging demonstrates cardiac activity that is absent to that of the adjacent rib cage.  Study is negative for TTR amyloidosis with an uptake ratio of 1.03.  Perugini Score of 0     Echo 7/27/21 reviewed:  The left ventricle is normal in size with concentric hypertrophy and mildly decreased systolic function.  The  estimated ejection fraction is 40%.  Normal right ventricular size with normal right ventricular systolic function.  Indeterminate left ventricular diastolic function.  Moderate left atrial enlargement.           IMPRESSION AND RECOMMENDATIONS:  89y/o male with h/o of kidney transplant present for f/u     1. Renal: s Cr stable, within baseline range  Stable renal function. CKD stage 4     Cr tends to fluctuate, but overall slowly worsening over several years  Reason for s Cr fluctuations is the use of diuretics and h/o of CHF. Pre-renal azotemia  OK to continue diuretics (despite changes in Cr) as pt will need diuretics for fluid balance  The dose of the diuretics should be proportional to the amount of fluid gain     H/o of DM and HTN  Was on HD x 2.5 years before transplant     Complications of CKD:  K normal  Na normal  Acid base stable, metabolic alkalosis due to diuretics  Ca normal  PO4 stable  Secondary hyperparathyroidism, on calcitriol   Anemia, following with hem/onc      2. Immunosuppression  H/o of living related kidney transplant in 2015 form daughter  Prograf level pending today, was not done before this visit  Was re-ordered for am. No change in dose until level is back  Meds and doses reviewed with pt     Leukopenia: WBC stable. Pt had previously small drop in WBC  NO lowered cellcept dose from 750 to 500 mg po bid  WBC currently slightly elevated. Related to CMV?     3. Diarrhea in Dec 2022. Has resolved  Last CMV titer by PCR negative   S/p colonoscopy on 6/7/23, found no sign of virally induced inflammation  No sign of CMV colitis on colonoscopy  S/p Valcyte     4. Cardiac: diastolic and systolic CHF  Has worsened peripheral edema  The low BP is a problem in increasing diuretics  On lasix.  Metolazone was stopped by other providers  Reports large fluid intake   Limit salt in diet <2-3 g/day  Limit fluids < 1.5 L/day  Strongly advised pt to limit water intake (his daughter says he drinks al the  time)    Previously cardiac amyloid was suspected, was ruled out by above cardiac test  SPEP and UPEP unremarkable, no monoclonality     5. HTN: BP normal to low  Meds reviewed  Asymptomatic,  Will lower metoprolol 25 mg XL from bid to qd       Plans and recommendations:  As discussed above  Complex visit  Total time spent 40 minutes including time needed to review the records, the   patient evaluation, documentation, face-to-face discussion with the patient,   more than 50% of the time was spent on coordination of care and counseling.    Level V visit.  RTC 4 months     Hany Gonsalez MD

## 2024-06-25 ENCOUNTER — LAB VISIT (OUTPATIENT)
Dept: LAB | Facility: HOSPITAL | Age: 69
End: 2024-06-25
Attending: INTERNAL MEDICINE
Payer: COMMERCIAL

## 2024-06-25 DIAGNOSIS — Z94.0 KIDNEY TRANSPLANTED: ICD-10-CM

## 2024-06-25 PROCEDURE — 36415 COLL VENOUS BLD VENIPUNCTURE: CPT | Performed by: INTERNAL MEDICINE

## 2024-06-25 PROCEDURE — 80197 ASSAY OF TACROLIMUS: CPT | Performed by: INTERNAL MEDICINE

## 2024-06-26 LAB — TACROLIMUS BLD-MCNC: 8.6 NG/ML (ref 5–15)

## 2024-06-26 NOTE — TELEPHONE ENCOUNTER
Refill Routing Note   Medication(s) are not appropriate for processing by [facility] for the following reason(s):      - NO PCP LISTED IN EPIC; ROUTING TO ALIX KOWALSKI AS LAST PRESCRIBING PROVIDER    [facility] action(s):  Route          Medication reconciliation completed: No     Appointments  past 12m or future 3m with PCP    Date Provider   Last Visit   3/28/2023 Alix Kowalski DO   Next Visit   Visit date not found Alix Kowalski DO   ED visits in past 90 days: 0        Note composed:7:00 AM 06/26/2024

## 2024-06-26 NOTE — H&P
Patient has an appointment 5/24 and will need lab orders to do prior.    Subjective:     Patient ID: Mitch Whittaker is a 68 y.o. male.    Chief Complaint: No chief complaint on file.      HPI:  The patient is a 68-year-old male status post right carpal tunnel release 12/01/2023 with good results.  He wishes to schedule a left carpal tunnel release.  Additionally he has left elbow lateral epicondylitis and was last injected 01/16/2024 with good results and wishes reinjection today.  He had nerve studies that did show diabetic neuropathy as well as bilateral carpal tunnel syndrome 04/18/2023.    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 RNA NEGATIVE 5/2015, 2021, 2022    History of colon polyps     HPTH (hyperparathyroidism)     Hyperlipidemia     Hypertension associated with stage 3 chronic kidney disease due to type 2 diabetes mellitus     LBBB (left bundle branch block) 12/20/2021    Morbid obesity with BMI of 45.0-49.9, adult     Nephrolithiasis 6/7/2013    PCO (posterior capsular opacification), left 03/04/2019    Proteinuria     resolved s/p transplant    S/P kidney transplant     Sleep apnea     Type 2 diabetes, uncontrolled, with retinopathy     Type II diabetes mellitus with renal manifestations      Past Surgical History:   Procedure Laterality Date     CARDIAC CATHETERIZATION  2008    normal coronary    CARPAL TUNNEL RELEASE Right 2023    Procedure: RELEASE, CARPAL TUNNEL;  Surgeon: Noel Almonte MD;  Location: [facility] OR;  Service: Orthopedics;  Laterality: Right;    COLONOSCOPY N/A 2018    Procedure: COLONOSCOPY;  Surgeon: Chava Ronquillo MD;  Location: [facility] ENDO;  Service: Endoscopy;  Laterality: N/A;    COLONOSCOPY N/A 2022    Procedure: COLONOSCOPY;  Surgeon: Alix Puente MD;  Location: [facility] ENDO;  Service: Endoscopy;  Laterality: N/A;    COLONOSCOPY N/A 2023    Procedure: COLONOSCOPY - rule out CMV  Cardiac clearance/Eliquis hold approval received on 23 per Dr. Meade, cardiology.  Note in encounters.  LB;  Surgeon: Daniella Shah MD;  Location: [facility] ENDO;  Service: Endoscopy;  Laterality: N/A;    ESOPHAGOGASTRODUODENOSCOPY N/A 2024    Procedure: EGD (ESOPHAGOGASTRODUODENOSCOPY) 3/1-pt cleared, ok to hold eliquis for 3 days;  Surgeon: Daniella Shah MD;  Location: [facility] ENDO;  Service: Endoscopy;  Laterality: N/A;    KIDNEY TRANSPLANT      RETINAL LASER PROCEDURE       Family History   Problem Relation Name Age of Onset    Diabetes Mother      Hypertension Mother      Heart failure Mother      Heart failure Father      Kidney disease Sister          ESRD    Diabetes Sister      Diabetes Maternal Grandmother      Cancer Neg Hx       Social History     Socioeconomic History    Marital status:     Number of children: 2   Occupational History    Occupation: retired     Employer: Retired   Tobacco Use    Smoking status: Former     Current packs/day: 0.00     Types: Cigarettes     Quit date: 2013     Years since quittin.0     Passive exposure: Past    Smokeless tobacco: Former     Quit date: 2013    Tobacco comments:     used marijuana since 8312-4242, stopped after started dialysis   Substance and Sexual Activity    Alcohol use: No     Alcohol/week: 0.0 standard drinks  "of alcohol    Drug use: Not Currently     Comment:      Sexual activity: Never   Social History Narrative    . Lives with spouse. Has 2 children. Patient retired as  for [employer]. He has been washing cars.     Social Determinants of Health     Financial Resource Strain: Low Risk  (10/2/2020)    Overall Financial Resource Strain (CARDIA)     Difficulty of Paying Living Expenses: Not hard at all   Transportation Needs: No Transportation Needs (10/2/2020)    PRAPARE - Transportation     Lack of Transportation (Medical): No     Lack of Transportation (Non-Medical): No   Stress: No Stress Concern Present (10/2/2020)    [employer] [place] [employer] - Occupational Stress Questionnaire     Feeling of Stress : Not at all     Medication List with Changes/Refills   Current Medications    AMITRIPTYLINE (ELAVIL) 25 MG TABLET    Take 1 tablet (25 mg total) by mouth nightly as needed for Insomnia.    APIXABAN (ELIQUIS) 5 MG TAB    Take 1 tablet (5 mg total) by mouth 2 (two) times daily.    ASPIRIN (ECOTRIN) 81 MG EC TABLET    Take 1 tablet by mouth Daily.     BD ULTRA-FINE MINI PEN NEEDLE 31 GAUGE X 3/16" NDLE    Use to inject insulin as needed up to 4 times daily    BLOOD SUGAR DIAGNOSTIC (CONTOUR NEXT TEST STRIPS) STRP    by Misc.(Non-Drug; Combo Route) route 4 (four) times daily before meals and nightly.    BLOOD-GLUCOSE METER (FREESTYLE LITE METER) KIT    1 each 4 (four) times daily.    CALCITRIOL (ROCALTROL) 0.25 MCG CAP    Take 1 capsule (0.25 mcg total) by mouth once daily.    COVID ORT86-18,12UP,,ANDU,,PF, (SPIKEVAX 2534-0545,12Y UP,,PF,) 50 MCG/0.5 ML INJECTION    Inject into the muscle.    FAMOTIDINE (PEPCID) 20 MG TABLET    TAKE ONE TABLET BY MOUTH EVERY EVENING    FERROUS SULFATE (FEOSOL) 325 MG (65 MG IRON) TAB TABLET    Take 1 tablet (325 mg total) by mouth daily with breakfast.    FISH OIL-OMEGA-3 FATTY ACIDS 300-1,000 MG CAPSULE    Take 1 g by mouth once daily.    FLUORIDE, " SODIUM, (PREVIDENT) 1.1 % GEL    use as directed to brush teeth daily    FUROSEMIDE (LASIX) 80 MG TABLET    Take one-half tablet (40 mg) by mouth 2 (two) times daily.    GABAPENTIN (NEURONTIN) 100 MG CAPSULE    Take 1 capsule (100 mg total) by mouth 3 (three) times daily.    GLIMEPIRIDE (AMARYL) 2 MG TABLET    Take 1 tablet (2 mg total) by mouth before breakfast.    INSULIN ASPART U-100 (NOVOLOG FLEXPEN U-100 INSULIN) 100 UNIT/ML (3 ML) INPN PEN    Inject 25 Units into the skin 3 (three) times daily with meals.    INSULIN GLARGINE (LANTUS U-100 INSULIN) 100 UNIT/ML INJECTION    Inject 35 Units into the skin 2 (two) times a day.    INSULIN GLARGINE U-100, LANTUS, (LANTUS SOLOSTAR U-100 INSULIN) 100 UNIT/ML (3 ML) INPN PEN    Inject 35 Units into the skin 2 (two) times daily.    KETOCONAZOLE (NIZORAL) 200 MG TAB    Take HALF A tablet (100 mg total) by mouth once daily.    LANCETS (MICROLET LANCET) MISC    100 lancets by Misc.(Non-Drug; Combo Route) route 4 (four) times daily before meals and nightly.    MAGNESIUM OXIDE (MAG-OX) 400 MG (241.3 MG MAGNESIUM) TABLET    Take 1 tablet (400 mg total) by mouth once daily.    METOPROLOL SUCCINATE (TOPROL-XL) 25 MG 24 HR TABLET    Take 1 tablet (25 mg total) by mouth once daily.    MONTELUKAST (SINGULAIR) 10 MG TABLET    Take 1 tablet (10 mg total) by mouth every evening.    MULTIVITAMIN (THERAGRAN) TABLET    Take 1 tablet by mouth once daily.    MYCOPHENOLATE (CELLCEPT) 250 MG CAP    Take 2 capsules (500 mg total) by mouth 2 (two) times daily.    ONDANSETRON (ZOFRAN) 4 MG TABLET    Take 1 tablet (4 mg total) by mouth every 6 (six) hours.    POTASSIUM CHLORIDE SA (K-DUR,KLOR-CON) 20 MEQ TABLET    Take 2 tablets (40 mEq total) by mouth once daily.    PREDNISONE (DELTASONE) 5 MG TABLET    Take 1 tablet (5 mg total) by mouth once daily.    PROMETHAZINE-DEXTROMETHORPHAN (PROMETHAZINE-DM) 6.25-15 MG/5 ML SYRP    Take 5 mLs by mouth every 6 (six) hours as needed (cough).     ROSUVASTATIN (CRESTOR) 40 MG TAB    Take 1 tablet (40 mg total) by mouth once daily.    SACUBITRIL-VALSARTAN (ENTRESTO)  MG PER TABLET    Take 1 tablet by mouth 2 (two) times daily.    SEMAGLUTIDE (OZEMPIC) 1 MG/DOSE (4 MG/3 ML)    Inject 1 mg into the skin every 7 days. Call office in 2 months to increase    TACROLIMUS (PROGRAF) 1 MG CAP    Take 5 capsules (5 mg) by mouth every morning and 4 capsules (4 mg) by mouth every evening (9 mg per day total).    TAMSULOSIN (FLOMAX) 0.4 MG CAP    Take 1 capsule (0.4 mg total) by mouth once daily.    TRAMADOL (ULTRAM) 50 MG TABLET    Take 1 tablet (50 mg total) by mouth every 4 (four) hours as needed for Pain.    TRIAMCINOLONE ACETONIDE 0.1% (KENALOG) 0.1 % OINTMENT    Apply topically 2 (two) times daily. Use on bilateral lower legs.     Review of patient's allergies indicates:   Allergen Reactions    Lisinopril Other (See Comments)     Other reaction(s):  cough    Actos  [pioglitazone] Other (See Comments)     Other reaction(s): CHF    Metformin Other (See Comments)     Other reaction(s): renal insuff  Other reaction(s): CHF     Review of Systems   Constitutional: Negative for malaise/fatigue.   HENT:  Negative for hearing loss.    Eyes:  Positive for visual disturbance. Negative for double vision.   Cardiovascular:  Positive for chest pain, irregular heartbeat and syncope.   Respiratory:  Positive for sleep disturbances due to breathing. Negative for shortness of breath.    Endocrine: Negative for cold intolerance.   Hematologic/Lymphatic: Does not bruise/bleed easily.   Skin:  Negative for poor wound healing and suspicious lesions.   Musculoskeletal:  Positive for falls and neck pain. Negative for gout, joint pain and joint swelling.   Gastrointestinal:  Positive for diarrhea. Negative for nausea and vomiting.   Genitourinary:  Negative for dysuria.   Neurological:  Positive for dizziness, numbness, paresthesias and sensory change.   Psychiatric/Behavioral:   Negative for depression, memory loss and substance abuse. The patient is not nervous/anxious.    Allergic/Immunologic: Negative for persistent infections.       Objective:   There is no height or weight on file to calculate BMI.  There were no vitals filed for this visit.             General    Constitutional: He is oriented to person, place, and time. He appears well-developed and well-nourished. No distress.   HENT:   Head: Normocephalic.   Mouth/Throat: Oropharynx is clear and moist.   Eyes: EOM are normal.   Cardiovascular:  Normal rate.            Pulmonary/Chest: Effort normal.   Abdominal: Soft.   Neurological: He is alert and oriented to person, place, and time. No cranial nerve deficit.   Psychiatric: He has a normal mood and affect.         Left Hand/Wrist Exam     Pain   Wrist - The patient exhibits pain of the flexor/pronator group.    Tests   Phalens sign: positive  Carpal Tunnel Compression Test: positive      Other     Sensory Exam  Median Distribution: abnormal  Ulnar Distribution: normal  Radial Distribution: normal      Left Elbow Exam     Inspection   Scars: absent  Effusion: absent    Pain   The patient exhibits pain of the lateral epicondyle    Other   Sensation: normal    Comments:  The patient has tenderness well localized left elbow lateral epicondyle with pain on resisted wrist and finger extension.  He has a positive Tinel and positive Phalen sign.  There is no thenar atrophy noted.        Vascular Exam       Capillary Refill  Left Hand: normal capillary refill          Relevant imaging results reviewed and interpreted by me, discussed with the patient and / or family today radiographs left elbow showed lateral epicondyle osteophyte as well as olecranon osteophyte.  Radiographs left hand showed vascular markings in the arterial tree  Assessment:     Encounter Diagnoses   Name Primary?    Bilateral carpal tunnel syndrome Yes    Lateral epicondylitis of left elbow         Plan:     The  patient injected left elbow lateral epicondyle with 1 cc Kenalog and 1 cc 2% plain lidocaine.  He wishes to schedule a left carpal tunnel release.  He was counseled regarding the surgery.  Risk complications and alternatives were discussed including the risk of infection, anesthetic risk, injury to nerves and vessels, loss of motion, and possible need for additional surgeries were discussed.  He is aware he does have an element of diabetic neuropathy that would be unchanged.  He will need to be cleared by his cardiologist.  He seemed to do well with his right carpal tunnel release.  He needs to be off his Eliquis for at least 2 days.                Disclaimer: This note was prepared using a voice recognition system and is likely to have sound alike errors within the text.

## 2024-06-27 RX ORDER — ROSUVASTATIN CALCIUM 40 MG/1
40 TABLET, COATED ORAL DAILY
Qty: 90 TABLET | Refills: 0 | OUTPATIENT
Start: 2024-06-27

## 2024-06-27 NOTE — TELEPHONE ENCOUNTER
Provider Staff:     Action required for this patient.    Please note Refusal of medication.            Requested Prescriptions     Refused Prescriptions Disp Refills    rosuvastatin (CRESTOR) 40 MG Tab 90 tablet 0     Sig: Take 1 tablet (40 mg total) by mouth once daily.     Refused By: ISELA COELHO     Reason for Refusal: Patient needs an appointment      Thanks!  [facility]   Note composed: 06/27/2024 11:48 AM

## 2024-06-28 ENCOUNTER — OFFICE VISIT (OUTPATIENT)
Dept: OPHTHALMOLOGY | Facility: CLINIC | Age: 69
End: 2024-06-28
Payer: COMMERCIAL

## 2024-06-28 DIAGNOSIS — H35.373 EPIRETINAL MEMBRANE (ERM) OF BOTH EYES: ICD-10-CM

## 2024-06-28 DIAGNOSIS — H26.491 PCO (POSTERIOR CAPSULE OPACIFICATION), RIGHT: ICD-10-CM

## 2024-06-28 DIAGNOSIS — E11.3553 TYPE 2 DIABETES MELLITUS WITH STABLE PROLIFERATIVE RETINOPATHY OF BOTH EYES, WITH LONG-TERM CURRENT USE OF INSULIN: Primary | ICD-10-CM

## 2024-06-28 DIAGNOSIS — Z96.1 PSEUDOPHAKIA OF BOTH EYES: ICD-10-CM

## 2024-06-28 DIAGNOSIS — Z79.4 TYPE 2 DIABETES MELLITUS WITH STABLE PROLIFERATIVE RETINOPATHY OF BOTH EYES, WITH LONG-TERM CURRENT USE OF INSULIN: Primary | ICD-10-CM

## 2024-06-28 PROCEDURE — 99999 PR PBB SHADOW E&M-EST. PATIENT-LVL II: CPT | Mod: PBBFAC,,, | Performed by: OPHTHALMOLOGY

## 2024-06-28 NOTE — PROGRESS NOTES
HPI     Eye Pain     Additional comments: Pt states pt pain is present behind OD symptoms   occurred for appox 2-3 wks ago an has migrated to OS. Pt states he has   blurry vision OS. A1C: 7.4 BSL: 275           Comments    Mitch Whittaker is a 67 y.o. male  Last visit [facility]: :5/16/2023   Last visit eye dept. Visit date not found     Uncorrected distance visual acuity was 20/20 in the right eye and 20/20 in   the left eye.  Tonometry               Last edited by Satnam North on 6/28/2024 10:57 AM.            Assessment /Plan     For exam results, see Encounter Report.    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, with long-term current use of insulin  Stable, no active disease. Optpos at [facility] next available.    Epiretinal membrane (ERM) of both eyes  [facility]OCT at [facility] next visit.    Pseudophakia of both eyes  Mild PCO OD. Not visually significant. Continue to monitor.    Posterior capsular opacification right eye  See above    RTC at the [facility] next available for Optos, mOCT

## 2024-06-29 DIAGNOSIS — Z94.0 KIDNEY REPLACED BY TRANSPLANT: ICD-10-CM

## 2024-07-01 RX ORDER — MYCOPHENOLATE MOFETIL 250 MG/1
500 CAPSULE ORAL 2 TIMES DAILY
Qty: 120 CAPSULE | Refills: 11 | Status: SHIPPED | OUTPATIENT
Start: 2024-07-01 | End: 2025-07-01

## 2024-07-02 ENCOUNTER — PATIENT MESSAGE (OUTPATIENT)
Dept: NEPHROLOGY | Facility: CLINIC | Age: 69
End: 2024-07-02
Payer: COMMERCIAL

## 2024-07-02 DIAGNOSIS — R05.8 ALLERGIC COUGH: ICD-10-CM

## 2024-07-02 DIAGNOSIS — I50.42 CHRONIC COMBINED SYSTOLIC AND DIASTOLIC CONGESTIVE HEART FAILURE: ICD-10-CM

## 2024-07-02 RX ORDER — FUROSEMIDE 80 MG/1
TABLET ORAL
Qty: 30 TABLET | Refills: 11 | Status: SHIPPED | OUTPATIENT
Start: 2024-07-02

## 2024-07-02 RX ORDER — POTASSIUM CHLORIDE 20 MEQ/1
40 TABLET, EXTENDED RELEASE ORAL DAILY
Qty: 180 TABLET | Refills: 1 | Status: SHIPPED | OUTPATIENT
Start: 2024-07-02

## 2024-07-02 RX ORDER — ROSUVASTATIN CALCIUM 40 MG/1
40 TABLET, COATED ORAL DAILY
Qty: 90 TABLET | Refills: 3 | Status: SHIPPED | OUTPATIENT
Start: 2024-07-02

## 2024-07-02 RX ORDER — GLIMEPIRIDE 2 MG/1
2 TABLET ORAL
Qty: 90 TABLET | Refills: 3 | Status: SHIPPED | OUTPATIENT
Start: 2024-07-02

## 2024-07-02 RX ORDER — ROSUVASTATIN CALCIUM 40 MG/1
40 TABLET, COATED ORAL DAILY
Qty: 90 TABLET | Refills: 0 | OUTPATIENT
Start: 2024-07-02

## 2024-07-02 RX ORDER — TAMSULOSIN HYDROCHLORIDE 0.4 MG/1
1 CAPSULE ORAL DAILY
Qty: 90 CAPSULE | Refills: 3 | Status: SHIPPED | OUTPATIENT
Start: 2024-07-02

## 2024-07-02 RX ORDER — MONTELUKAST SODIUM 10 MG/1
10 TABLET ORAL NIGHTLY
Qty: 90 TABLET | Refills: 3 | Status: SHIPPED | OUTPATIENT
Start: 2024-07-02

## 2024-07-02 NOTE — TELEPHONE ENCOUNTER
Dany ZARATE. Previously Denied   Refill Authorization Note   Mitch Panfilo  is requesting a refill authorization.  Brief Assessment and Rationale for Refill:  Quick Discontinue  Medication Therapy Plan:  Previously denied 6/27/24 by provider. pt needs appointment    Medication Reconciliation Completed:  No      Comments:     Note composed:1:33 AM 07/02/2024

## 2024-07-02 NOTE — TELEPHONE ENCOUNTER
Provider Staff:  Please note Refusal of medication.     Action required for this patient. Patient needs appointment      Requested Prescriptions     Refused Prescriptions Disp Refills    rosuvastatin (CRESTOR) 40 MG Tab 90 tablet 0     Sig: Take 1 tablet (40 mg total) by mouth once daily.     Refused By: VALENTINA CORONA     Reason for Refusal: Patient needs an appointment      Thanks!  [facility]   Note composed: 07/02/2024 1:33 AM            125

## 2024-07-15 ENCOUNTER — TELEPHONE (OUTPATIENT)
Dept: PREADMISSION TESTING | Facility: HOSPITAL | Age: 69
End: 2024-07-15
Payer: COMMERCIAL

## 2024-07-15 NOTE — TELEPHONE ENCOUNTER
----- Message from Sean Lobato RN sent at 7/15/2024 11:57 AM CDT -----  Regarding: FW: SURGERY 7/18/24    ----- Message -----  From: Kasi Dejesus MD  Sent: 7/15/2024  10:36 AM CDT  To: Sean Lobato RN; Marci Pan MD  Subject: RE: SURGERY 7/18/24                              For a release of a carpal  tunnel syndrome there is no need to make changes with immunosuppression. From the transplant standpoint no contra-indications  ----- Message -----  From: Sean Lobato RN  Sent: 7/15/2024   6:00 AM CDT  To: Marci Pan MD  Subject: FW: SURGERY 7/18/24                                ----- Message -----  From: Nikki Jerome RN  Sent: 7/12/2024   3:51 PM CDT  To: Sean Lobato RN  Subject: FW: SURGERY 7/18/24                                ----- Message -----  From: Ghazala Ennis RN  Sent: 7/12/2024   3:25 PM CDT  To: Eleanor Pizano NP; Maxim Covarrubias Staff  Subject: FW: SURGERY 7/18/24                              Good Afternoon,    Just wanted to reach back regarding a clearance note requested on 6/18/24, along with recommendations for Tacrolimus and Mycophenolate. Clearance requested by Pre Op Clinic Provider Eleanor Pizano NP.  ORIGINAL MSG:  This pt will be having surgery on 7/18/24 with Dr Almonte. We have reviewed the patient's medical history and are requesting a clearance note. Please advise on whether this patient can be cleared to proceed from your standpoint or will need any further testing to ensure they are optimized to proceed.   Patient is currently taking Tacrolimus and Mycophenolate and we would like your recommendations on how long they should hold this medication prior to their procedure.    Please Advise    Thank you,  Ghazala  Pre Admit Testing  ----- Message -----  From: Ghazala Ennis RN  Sent: 6/18/2024   1:35 PM CDT  To: Eleanor Pizano NP; Maxim Covarrubias Staff  Subject: SURGERY 7/18/24                                  Hello,   This pt will be having  surgery on 7/18/24 with Dr Almonte. We have reviewed the patient's medical history and are requesting a clearance note. Please advise on whether this patient can be cleared to proceed from your standpoint or will need any further testing to ensure they are optimized to proceed.   Patient is currently taking Tacrolimus and Mycophenolate and we would like your recommendations on how long they should hold this medication prior to their procedure.       -Thanks in advance,  [facility]  [phone] or [phone]  Mon-Fri 7:30 am- 4 pm (Closed Major Holidays)

## 2024-07-17 ENCOUNTER — ANESTHESIA EVENT (OUTPATIENT)
Dept: SURGERY | Facility: HOSPITAL | Age: 69
End: 2024-07-17
Payer: COMMERCIAL

## 2024-07-17 ENCOUNTER — TELEPHONE (OUTPATIENT)
Dept: PREADMISSION TESTING | Facility: HOSPITAL | Age: 69
End: 2024-07-17
Payer: COMMERCIAL

## 2024-07-17 NOTE — ANESTHESIA PREPROCEDURE EVALUATION
07/17/2024  Mitch Whittaker is a 68 y.o., male.    Patient Active Problem List   Diagnosis    Anemia associated with chronic renal failure    Obesity    Acquired renal cyst of left kidney    Obstructive sleep apnea    Pseudophakia    Coronary artery disease of native artery of native heart with stable angina pectoris    Living-related donor kidney transplant (daughter) - 6/12/15    Diabetic sensorimotor polyneuropathy    Chronic immunosuppression with Prograf, MMF and prednisone    Secondary hyperparathyroidism of renal origin    Type II diabetes mellitus with renal manifestations    Hypertension associated with stage 3 chronic kidney disease due to type 2 diabetes mellitus    DM (diabetes mellitus), type 2 with complications    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, with long-term current use of insulin    Epiretinal membrane (ERM) of both eyes    History of colon polyps    Benign prostatic hyperplasia without lower urinary tract symptoms    PCO (posterior capsular opacification), left    Chronic combined systolic and diastolic congestive heart failure    Deep vein thrombosis (DVT) of lower extremity    Chronic venous insufficiency of lower extremity    Morbid obesity with BMI of 40.0-44.9, adult    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    LBBB (left bundle branch block)    Lambda light chain disease    Cellulitis of extremity    Head injury    Dizziness    Chronic anticoagulation    Fall    Hypotension due to hypovolemia    Severe obesity (BMI >= 40)    Orthostatic hypotension    Kidney transplanted    Syncope and collapse    Dehydration    Chronic cough    Cervical radiculopathy    Bilateral carpal tunnel syndrome    Preop cardiovascular exam    Chronic diarrhea    Acute on chronic  diastolic congestive heart failure    Iron deficiency anemia due to chronic blood loss    CKD (chronic kidney disease) stage 4, GFR 15-29 ml/min     Results for orders placed during the hospital encounter of 08/22/23    Echo    Interpretation Summary    Left Ventricle: The left ventricle is normal in size. Moderately increased ventricular mass. Moderately increased wall thickness. Normal wall motion. There is low normal systolic function with a visually estimated ejection fraction of 50 - 55%. There is normal diastolic function.    Left Atrium: Left atrium is severely dilated.    Right Ventricle: Normal right ventricular cavity size. Wall thickness is normal. Right ventricle wall motion  is normal. Systolic function is normal.    Right Atrium: Right atrium is dilated.    IVC/SVC: Normal venous pressure at 3 mmHg.    Pre-op Assessment    I have reviewed the Patient Summary Reports.    I have reviewed the NPO Status.   I have reviewed the Medications.     Review of Systems  Anesthesia Hx:  No problems with previous Anesthesia                Social:  Non-Smoker       Hematology/Oncology:       -- Anemia:                                  Cardiovascular:     Hypertension   CAD    Dysrhythmias  Angina CHF    hyperlipidemia                             Pulmonary:        Sleep Apnea                Renal/:  Chronic Renal Disease, CKD renal calculi  S/P kidney transplant             Hepatic/GI:      Liver Disease, Hepatitis, C           Neurological:           Cervical radiculopathy    Diabetic neuropathy      Peripheral Neuropathy                          Endocrine:  Diabetes   HPTH (hyperparathyroidism)      Morbid Obesity / BMI > 40      Physical Exam  General: Well nourished, Cooperative, Alert and Oriented    Airway:  Mallampati: II / II  Mouth Opening: Normal  TM Distance: Normal  Tongue: Normal  Neck ROM: Normal ROM    Dental:  Intact        Anesthesia Plan  Type of Anesthesia, risks & benefits  discussed:    Anesthesia Type: Gen ETT, MAC  Intra-op Monitoring Plan: Standard ASA Monitors  Post Op Pain Control Plan: multimodal analgesia  Induction:  IV  Airway Plan: Direct  Informed Consent: Informed consent signed with the Patient and all parties understand the risks and agree with anesthesia plan.  All questions answered.   ASA Score: 3  Day of Surgery Review of History & Physical: H&P Update referred to the surgeon/provider.I have interviewed and examined the patient. I have reviewed the patient's H&P dated: There are no significant changes. H&P completed by Anesthesiologist.    Ready For Surgery From Anesthesia Perspective.     .      Chemistry        Component Value Date/Time     06/18/2024 1216    K 4.4 06/18/2024 1216     06/18/2024 1216    CO2 26 06/18/2024 1216    BUN 29 (H) 06/18/2024 1216    CREATININE 2.5 (H) 06/18/2024 1216     (H) 06/18/2024 1216        Component Value Date/Time    CALCIUM 9.5 06/18/2024 1216    ALKPHOS 56 01/29/2024 1034    AST 21 01/29/2024 1034    ALT 30 01/29/2024 1034    BILITOT 0.4 01/29/2024 1034    ESTGFRAFRICA 22.7 (A) 07/06/2022 0855    EGFRNONAA 19.6 (A) 07/06/2022 0855        Lab Results   Component Value Date    WBC 3.87 (L) 06/18/2024    HGB 11.2 (L) 06/18/2024    HCT 37.5 (L) 06/18/2024    MCV 87 06/18/2024     06/18/2024         Sinus tachycardia   ST and T wave abnormality, consider lateral ischemia   Abnormal ECG   When compared with ECG of 10-OCT-2023 12:21,   The axis Shifted left   ST no longer depressed in Inferior leads   Non-specific change in ST segment in Lateral leads   T wave inversion no longer evident in Inferior leads   T wave inversion more evident in Lateral leads   Confirmed by TYREL DUKE, RYANNE ([id number]) on 2/22/2024 4:22:51 PM     Nuc stress 10/10/23:    Normal myocardial perfusion scan. There is no evidence of myocardial ischemia or infarction.    There is a mild intensity perfusion abnormality in the anteroapical  wall of the left ventricle, secondary to breast attenuation.    The gated perfusion images showed an ejection fraction of 41% at rest. The gated perfusion images showed an ejection fraction of 54% post stress.    The ECG portion of the study is uninterpretable due to left bundle branch block.    The patient reported no chest pain during the stress test.       Echo 8/22/23:    Left Ventricle: The left ventricle is normal in size. Moderately increased ventricular mass. Moderately increased wall thickness. Normal wall motion. There is low normal systolic function with a visually estimated ejection fraction of 50 - 55%. There is normal diastolic function.    Left Atrium: Left atrium is severely dilated.    Right Ventricle: Normal right ventricular cavity size. Wall thickness is normal. Right ventricle wall motion  is normal. Systolic function is normal.    Right Atrium: Right atrium is dilated.    IVC/SVC: Normal venous pressure at 3 mmHg.     Left Ventricle The left ventricle is normal in size. Moderately increased ventricular mass. Moderately increased wall thickness. Normal wall motion. There is low normal systolic function with a visually estimated ejection fraction of 50 - 55%. There is normal diastolic function.   Right Ventricle Normal right ventricular cavity size. Wall thickness is normal. Right ventricle wall motion  is normal. Systolic function is normal.   Left Atrium Left atrium is severely dilated.   Right Atrium Right atrium is dilated.   Aortic Valve The aortic valve is structurally normal. There is normal leaflet mobility. Aortic valve peak velocity is 1.05 m/s. Mean gradient is 2 mmHg.   Mitral Valve The mitral valve is structurally normal. There is normal leaflet mobility.   Tricuspid Valve The tricuspid valve is structurally normal. There is normal leaflet mobility.   Pulmonic Valve The pulmonic valve is structurally normal. There is normal leaflet mobility.   IVC/SVC Normal venous pressure at 3  mmHg.   Ascending Aorta Aortic root is normal in size measuring 3.95 cm. Ascending aorta is normal measuring 3.55 cm.   Pericardium and Other Findings There is no pericardial effusion.

## 2024-07-17 NOTE — TELEPHONE ENCOUNTER
Called and spoke with pt daughter about the following:     Please arrive to [facility] ([facility] at 0530 am on 7/18/2024 for your scheduled procedure.  Address: [address] [place] [address] [place] [address] ([address] on left, 1st Floor Lobby)  >>>NO eating or drinking after midnight unless instructed otherwise by your Surgeon<<<    Thank you,  -[facility]mit [facility].  Mon-Fri 7:30 am - 4 pm [phone]

## 2024-07-18 ENCOUNTER — HOSPITAL ENCOUNTER (OUTPATIENT)
Facility: HOSPITAL | Age: 69
Discharge: HOME | End: 2024-07-18
Attending: ORTHOPAEDIC SURGERY | Admitting: ORTHOPAEDIC SURGERY
Payer: COMMERCIAL

## 2024-07-18 ENCOUNTER — ANESTHESIA (OUTPATIENT)
Dept: SURGERY | Facility: HOSPITAL | Age: 69
End: 2024-07-18
Payer: COMMERCIAL

## 2024-07-18 DIAGNOSIS — G56.03 BILATERAL CARPAL TUNNEL SYNDROME: Primary | ICD-10-CM

## 2024-07-18 DIAGNOSIS — G56.02 CARPAL TUNNEL SYNDROME OF LEFT WRIST: ICD-10-CM

## 2024-07-18 LAB — POCT GLUCOSE: 297 MG/DL (ref 70–110)

## 2024-07-18 PROCEDURE — 25000003 PHARM REV CODE 250

## 2024-07-18 PROCEDURE — 37000008 HC ANESTHESIA 1ST 15 MINUTES: Performed by: ORTHOPAEDIC SURGERY

## 2024-07-18 PROCEDURE — 71000033 HC RECOVERY, INTIAL HOUR: Performed by: ORTHOPAEDIC SURGERY

## 2024-07-18 PROCEDURE — 37000009 HC ANESTHESIA EA ADD 15 MINS: Performed by: ORTHOPAEDIC SURGERY

## 2024-07-18 PROCEDURE — 25000003 PHARM REV CODE 250: Performed by: ANESTHESIOLOGY

## 2024-07-18 PROCEDURE — 71000015 HC POSTOP RECOV 1ST HR: Performed by: ORTHOPAEDIC SURGERY

## 2024-07-18 PROCEDURE — 63600175 PHARM REV CODE 636 W HCPCS: Performed by: NURSE ANESTHETIST, CERTIFIED REGISTERED

## 2024-07-18 PROCEDURE — 64721 CARPAL TUNNEL SURGERY: CPT | Mod: LT,,, | Performed by: ORTHOPAEDIC SURGERY

## 2024-07-18 PROCEDURE — 63600175 PHARM REV CODE 636 W HCPCS

## 2024-07-18 PROCEDURE — 25000003 PHARM REV CODE 250: Performed by: ORTHOPAEDIC SURGERY

## 2024-07-18 PROCEDURE — 25000003 PHARM REV CODE 250: Performed by: NURSE ANESTHETIST, CERTIFIED REGISTERED

## 2024-07-18 PROCEDURE — 36000707: Performed by: ORTHOPAEDIC SURGERY

## 2024-07-18 PROCEDURE — 36000706: Performed by: ORTHOPAEDIC SURGERY

## 2024-07-18 RX ORDER — HYDROCODONE BITARTRATE AND ACETAMINOPHEN 5; 325 MG/1; MG/1
1 TABLET ORAL EVERY 4 HOURS PRN
Status: CANCELLED | OUTPATIENT
Start: 2024-07-18

## 2024-07-18 RX ORDER — ONDANSETRON HYDROCHLORIDE 2 MG/ML
INJECTION, SOLUTION INTRAVENOUS
Status: DISCONTINUED | OUTPATIENT
Start: 2024-07-18 | End: 2024-07-18

## 2024-07-18 RX ORDER — CHLORHEXIDINE GLUCONATE ORAL RINSE 1.2 MG/ML
10 SOLUTION DENTAL
Status: DISCONTINUED | OUTPATIENT
Start: 2024-07-18 | End: 2024-07-18 | Stop reason: HOSPADM

## 2024-07-18 RX ORDER — PROPOFOL 10 MG/ML
VIAL (ML) INTRAVENOUS
Status: DISCONTINUED | OUTPATIENT
Start: 2024-07-18 | End: 2024-07-18

## 2024-07-18 RX ORDER — HYDROMORPHONE HYDROCHLORIDE 2 MG/ML
0.2 INJECTION, SOLUTION INTRAMUSCULAR; INTRAVENOUS; SUBCUTANEOUS EVERY 5 MIN PRN
Status: DISCONTINUED | OUTPATIENT
Start: 2024-07-18 | End: 2024-07-18 | Stop reason: HOSPADM

## 2024-07-18 RX ORDER — CHLORHEXIDINE GLUCONATE ORAL RINSE 1.2 MG/ML
10 SOLUTION DENTAL 2 TIMES DAILY
Status: CANCELLED | OUTPATIENT
Start: 2024-07-18 | End: 2024-07-23

## 2024-07-18 RX ORDER — OXYCODONE AND ACETAMINOPHEN 5; 325 MG/1; MG/1
1 TABLET ORAL
Status: DISCONTINUED | OUTPATIENT
Start: 2024-07-18 | End: 2024-07-18 | Stop reason: HOSPADM

## 2024-07-18 RX ORDER — LIDOCAINE HYDROCHLORIDE 20 MG/ML
INJECTION, SOLUTION EPIDURAL; INFILTRATION; INTRACAUDAL; PERINEURAL
Status: DISCONTINUED | OUTPATIENT
Start: 2024-07-18 | End: 2024-07-18 | Stop reason: HOSPADM

## 2024-07-18 RX ORDER — SODIUM CHLORIDE 0.9 % (FLUSH) 0.9 %
10 SYRINGE (ML) INJECTION
Status: DISCONTINUED | OUTPATIENT
Start: 2024-07-18 | End: 2024-07-18 | Stop reason: HOSPADM

## 2024-07-18 RX ORDER — METOPROLOL SUCCINATE 25 MG/1
25 TABLET, EXTENDED RELEASE ORAL
Status: COMPLETED | OUTPATIENT
Start: 2024-07-18 | End: 2024-07-18

## 2024-07-18 RX ORDER — FENTANYL CITRATE 50 UG/ML
25 INJECTION, SOLUTION INTRAMUSCULAR; INTRAVENOUS EVERY 5 MIN PRN
Status: DISCONTINUED | OUTPATIENT
Start: 2024-07-18 | End: 2024-07-18 | Stop reason: HOSPADM

## 2024-07-18 RX ORDER — HYDROCODONE BITARTRATE AND ACETAMINOPHEN 5; 325 MG/1; MG/1
1 TABLET ORAL EVERY 6 HOURS PRN
Qty: 15 TABLET | Refills: 0 | Status: SHIPPED | OUTPATIENT
Start: 2024-07-18

## 2024-07-18 RX ORDER — MEPERIDINE HYDROCHLORIDE 25 MG/ML
12.5 INJECTION INTRAMUSCULAR; INTRAVENOUS; SUBCUTANEOUS ONCE AS NEEDED
Status: DISCONTINUED | OUTPATIENT
Start: 2024-07-18 | End: 2024-07-18 | Stop reason: HOSPADM

## 2024-07-18 RX ORDER — ONDANSETRON HYDROCHLORIDE 2 MG/ML
4 INJECTION, SOLUTION INTRAVENOUS ONCE AS NEEDED
Status: DISCONTINUED | OUTPATIENT
Start: 2024-07-18 | End: 2024-07-18 | Stop reason: HOSPADM

## 2024-07-18 RX ORDER — MIDAZOLAM HYDROCHLORIDE 1 MG/ML
INJECTION INTRAMUSCULAR; INTRAVENOUS
Status: DISCONTINUED | OUTPATIENT
Start: 2024-07-18 | End: 2024-07-18

## 2024-07-18 RX ORDER — LIDOCAINE HYDROCHLORIDE 10 MG/ML
INJECTION, SOLUTION EPIDURAL; INFILTRATION; INTRACAUDAL; PERINEURAL
Status: DISCONTINUED | OUTPATIENT
Start: 2024-07-18 | End: 2024-07-18

## 2024-07-18 RX ORDER — ONDANSETRON HYDROCHLORIDE 2 MG/ML
4 INJECTION, SOLUTION INTRAVENOUS DAILY PRN
Status: DISCONTINUED | OUTPATIENT
Start: 2024-07-18 | End: 2024-07-18 | Stop reason: HOSPADM

## 2024-07-18 RX ADMIN — PROPOFOL 50 MG: 10 INJECTION, EMULSION INTRAVENOUS at 07:07

## 2024-07-18 RX ADMIN — MIDAZOLAM HYDROCHLORIDE 2 MG: 1 INJECTION, SOLUTION INTRAMUSCULAR; INTRAVENOUS at 07:07

## 2024-07-18 RX ADMIN — DEXTROSE MONOHYDRATE 3 G: 5 INJECTION INTRAVENOUS at 07:07

## 2024-07-18 RX ADMIN — ONDANSETRON 4 MG: 2 INJECTION INTRAMUSCULAR; INTRAVENOUS at 07:07

## 2024-07-18 RX ADMIN — PROPOFOL 30 MG: 10 INJECTION, EMULSION INTRAVENOUS at 07:07

## 2024-07-18 RX ADMIN — SODIUM CHLORIDE, SODIUM LACTATE, POTASSIUM CHLORIDE, AND CALCIUM CHLORIDE: .6; .31; .03; .02 INJECTION, SOLUTION INTRAVENOUS at 07:07

## 2024-07-18 RX ADMIN — LIDOCAINE HYDROCHLORIDE 50 MG: 10 SOLUTION INTRAVENOUS at 07:07

## 2024-07-18 RX ADMIN — METOPROLOL SUCCINATE 25 MG: 25 TABLET, EXTENDED RELEASE ORAL at 06:07

## 2024-07-18 RX ADMIN — PROPOFOL 20 MG: 10 INJECTION, EMULSION INTRAVENOUS at 07:07

## 2024-07-18 NOTE — TRANSFER OF CARE
"Anesthesia Transfer of Care Note    Patient: Mitch Whittaker    Procedure(s) Performed: Procedure(s) (LRB):  RELEASE, CARPAL TUNNEL (Left)    Patient location: PACU    Anesthesia Type: MAC    Transport from OR: Transported from OR on room air with adequate spontaneous ventilation    Post pain: adequate analgesia    Post assessment: no apparent anesthetic complications    Post vital signs: stable    Level of consciousness: sedated    Nausea/Vomiting: no nausea/vomiting    Complications: none    Transfer of care protocol was followed      Last vitals: Visit Vitals  BP (!) 121/55 (BP Location: Right arm, Patient Position: Sitting)   Pulse 106   Temp 36.6 °C (97.9 °F) (Temporal)   Resp (!) 22   Ht 5' 6" (1.676 m)   Wt 123.6 kg (272 lb 7.8 oz)   SpO2 99%   BMI 43.98 kg/m²     "

## 2024-07-18 NOTE — ANESTHESIA POSTPROCEDURE EVALUATION
Anesthesia Post Evaluation    Patient: Mitch Whittaker    Procedure(s) Performed: Procedure(s) (LRB):  RELEASE, CARPAL TUNNEL (Left)    Final Anesthesia Type: MAC      Patient location during evaluation: PACU  Patient participation: Yes- Able to Participate  Level of consciousness: awake and alert  Post-procedure vital signs: reviewed and stable  Airway patency: patent      Anesthetic complications: no      Cardiovascular status: blood pressure returned to baseline  Respiratory status: unassisted and spontaneous ventilation  Hydration status: euvolemic  Follow-up not needed.              Vitals Value Taken Time   /54 07/18/24 0803   Temp 36.8 °C (98.3 °F) 07/18/24 0800   Pulse 103 07/18/24 0805   Resp 29 07/18/24 0804   SpO2 98 % 07/18/24 0805   Vitals shown include unfiled device data.      Event Time   Out of Recovery 08:06:41         Pain/Lakeisha Score: Lakeisha Score: 10 (7/18/2024  8:08 AM)

## 2024-07-18 NOTE — OP NOTE
[facility] - Surgery ([facility])  Orthopedic Surgery  Operative Note    SUMMARY     Date of Procedure: 7/18/2024   Assistant: None    Procedure: Procedure(s) (LRB):  RELEASE, CARPAL TUNNEL (Left)       Surgeons and Role:     * Noel Almonte MD - Primary    Assisting Surgeon: None    Pre-Operative Diagnosis:  Left carpal tunnel Syndrome    Post-Operative Diagnosis:  Left carpal tunnel syndrome    Anesthesia:  Local with sedation    Technical Procedures Used:  left carpal tunnel release    Description of the Findings of the Procedure:  The patient was taken to the operating room where 10 cc of 2% plain lidocaine was used for local anesthetic and was administered.  After exsanguination of the extremity a proximal tourniquet was inflated to 250 mm of mercury.  Satisfactory anesthesia had been achieved the left hand was prepped and draped in the usual sterile fashion.  The patient did receive 2 g of Ancef intravenously preoperatively.  At this time a longitudinal incision was made in line with the 4th ray over the transverse carpal ligament.  The incision was extended using blunt and sharp dissection under 3.5 loupe magnification.  The transverse carpal ligament was identified and sharply incised under direct vision.  A complete release was performed and verified by the surgeon's small finger both proximally and distally.  No additional pathology was encountered.  Satisfactory release had been achieved skin was closed using horizontal mattress 4 0 nylon suture.  Xeroform gauze 4x4s and 2 ABD pads were over wrapped with 3 in gauze dressing.  The patient tolerated the procedure well and was transferred to the recovery room in satisfactory condition.      Complications: No    Estimated Blood Loss (EBL): 5cc                        Condition: Good    Disposition: PACU - hemodynamically stable.    Attestation: I was present and scrubbed for the entire procedure.

## 2024-07-18 NOTE — DISCHARGE SUMMARY
[facility] - Surgery (Hospital)  Discharge Note  Short Stay    Procedure(s) (LRB):  RELEASE, CARPAL TUNNEL (Left)      OUTCOME: Patient tolerated treatment/procedure well without complication and is now ready for discharge.    DISPOSITION: Home or Self Care    FINAL DIAGNOSIS:  Left carpal tunnel syndrome    FOLLOWUP: In clinic    DISCHARGE INSTRUCTIONS:    Discharge Procedure Orders   Diet general     Diet general     Call MD for:  temperature >100.4     Call MD for:  persistent nausea and vomiting     Call MD for:  severe uncontrolled pain     Call MD for:  difficulty breathing, headache or visual disturbances     Call MD for:  redness, tenderness, or signs of infection (pain, swelling, redness, odor or green/yellow discharge around incision site)     Call MD for:  hives     Call MD for:  persistent dizziness or light-headedness     Call MD for:  extreme fatigue     Call MD for:  temperature >100.4     Call MD for:  persistent nausea and vomiting     Call MD for:  severe uncontrolled pain     Call MD for:  difficulty breathing, headache or visual disturbances     Call MD for:  redness, tenderness, or signs of infection (pain, swelling, redness, odor or green/yellow discharge around incision site)     Call MD for:  hives     Call MD for:  persistent dizziness or light-headedness     Call MD for:  extreme fatigue         Clinical Reference Documents Added to Patient Instructions         Document    CARPAL TUNNEL RELEASE (ENGLISH)    HYDROCODONE AND ACETAMINOPHEN, ADULT (ENGLISH)            TIME SPENT ON DISCHARGE:  20 minutes

## 2024-07-22 VITALS
SYSTOLIC BLOOD PRESSURE: 106 MMHG | RESPIRATION RATE: 21 BRPM | HEART RATE: 100 BPM | OXYGEN SATURATION: 98 % | BODY MASS INDEX: 43.79 KG/M2 | HEIGHT: 66 IN | WEIGHT: 272.5 LBS | TEMPERATURE: 98 F | DIASTOLIC BLOOD PRESSURE: 54 MMHG

## 2024-07-23 ENCOUNTER — OFFICE VISIT (OUTPATIENT)
Dept: ORTHOPEDICS | Facility: CLINIC | Age: 69
End: 2024-07-23
Payer: COMMERCIAL

## 2024-07-23 VITALS — HEIGHT: 66 IN | WEIGHT: 272.5 LBS | BODY MASS INDEX: 43.79 KG/M2

## 2024-07-23 DIAGNOSIS — M77.12 LATERAL EPICONDYLITIS, LEFT ELBOW: ICD-10-CM

## 2024-07-23 DIAGNOSIS — G56.03 BILATERAL CARPAL TUNNEL SYNDROME: Primary | ICD-10-CM

## 2024-07-23 PROCEDURE — 99999 PR PBB SHADOW E&M-EST. PATIENT-LVL V: CPT | Mod: PBBFAC,,, | Performed by: ORTHOPAEDIC SURGERY

## 2024-07-23 PROCEDURE — 4010F ACE/ARB THERAPY RXD/TAKEN: CPT | Mod: CPTII,S$GLB,, | Performed by: ORTHOPAEDIC SURGERY

## 2024-07-23 PROCEDURE — 3066F NEPHROPATHY DOC TX: CPT | Mod: CPTII,S$GLB,, | Performed by: ORTHOPAEDIC SURGERY

## 2024-07-23 PROCEDURE — 20551 NJX 1 TENDON ORIGIN/INSJ: CPT | Mod: 79,LT,S$GLB, | Performed by: ORTHOPAEDIC SURGERY

## 2024-07-23 PROCEDURE — 3288F FALL RISK ASSESSMENT DOCD: CPT | Mod: CPTII,S$GLB,, | Performed by: ORTHOPAEDIC SURGERY

## 2024-07-23 PROCEDURE — 1101F PT FALLS ASSESS-DOCD LE1/YR: CPT | Mod: CPTII,S$GLB,, | Performed by: ORTHOPAEDIC SURGERY

## 2024-07-23 PROCEDURE — 1125F AMNT PAIN NOTED PAIN PRSNT: CPT | Mod: CPTII,S$GLB,, | Performed by: ORTHOPAEDIC SURGERY

## 2024-07-23 PROCEDURE — 99213 OFFICE O/P EST LOW 20 MIN: CPT | Mod: 25,57,S$GLB, | Performed by: ORTHOPAEDIC SURGERY

## 2024-07-23 PROCEDURE — 3008F BODY MASS INDEX DOCD: CPT | Mod: CPTII,S$GLB,, | Performed by: ORTHOPAEDIC SURGERY

## 2024-07-23 PROCEDURE — 3051F HG A1C>EQUAL 7.0%<8.0%: CPT | Mod: CPTII,S$GLB,, | Performed by: ORTHOPAEDIC SURGERY

## 2024-07-23 PROCEDURE — 1159F MED LIST DOCD IN RCRD: CPT | Mod: CPTII,S$GLB,, | Performed by: ORTHOPAEDIC SURGERY

## 2024-07-23 PROCEDURE — 1160F RVW MEDS BY RX/DR IN RCRD: CPT | Mod: CPTII,S$GLB,, | Performed by: ORTHOPAEDIC SURGERY

## 2024-07-23 RX ORDER — TRIAMCINOLONE ACETONIDE 40 MG/ML
40 INJECTION, SUSPENSION INTRA-ARTICULAR; INTRAMUSCULAR
Status: DISCONTINUED | OUTPATIENT
Start: 2024-07-23 | End: 2024-07-23 | Stop reason: HOSPADM

## 2024-07-23 RX ADMIN — TRIAMCINOLONE ACETONIDE 40 MG: 40 INJECTION, SUSPENSION INTRA-ARTICULAR; INTRAMUSCULAR at 10:07

## 2024-07-23 NOTE — PROCEDURES
Tendon Origin: L elbow    Date/Time: 7/23/2024 10:15 AM    Performed by: Noel Almonte MD  Authorized by: Noel Almonte MD    Consent Done?:  Yes (Verbal)  Timeout: prior to procedure the correct patient, procedure, and site was verified    Indications:  Pain  Timeout: prior to procedure the correct patient, procedure, and site was verified    Location:  Elbow  Site:  L elbow  Prep: patient was prepped and draped in usual sterile fashion    Ultrasonic Guidance for Needle Placement?: No    Needle size:  25 G  Approach:  Anterolateral  Medications:  40 mg triamcinolone acetonide 40 mg/mL

## 2024-07-23 NOTE — PROGRESS NOTES
Subjective:     Patient ID: Mitch Whittaker is a 68 y.o. male.    Chief Complaint: Post-op Evaluation of the Left Wrist      HPI:  The patient is a 68-year-old male status post left carpal tunnel release date of surgery was 07/18/2024.  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 RNA NEGATIVE 5/2015, 2021, 2022    History of colon polyps     HPTH (hyperparathyroidism)     Hyperlipidemia     Hypertension associated with stage 3 chronic kidney disease due to type 2 diabetes mellitus     LBBB (left bundle branch block) 12/20/2021    Morbid obesity with BMI of 45.0-49.9, adult     Nephrolithiasis 6/7/2013    PCO (posterior capsular opacification), left 03/04/2019    Proteinuria     resolved s/p transplant    S/P kidney transplant     Sleep apnea     Type 2 diabetes, uncontrolled, with retinopathy     Type II diabetes mellitus with renal manifestations      Past Surgical History:   Procedure Laterality Date    CARDIAC CATHETERIZATION  01/01/2008    normal coronary    CARPAL TUNNEL RELEASE Right 12/01/2023    Procedure: RELEASE, CARPAL TUNNEL;  Surgeon: Noel Almonte MD;  Location: [facility] OR;  Service:  Orthopedics;  Laterality: Right;    CARPAL TUNNEL RELEASE Left 2024    Procedure: RELEASE, CARPAL TUNNEL;  Surgeon: Noel Almonte MD;  Location: [facility] OR;  Service: Orthopedics;  Laterality: Left;    COLONOSCOPY N/A 2018    Procedure: COLONOSCOPY;  Surgeon: Chava Ronquillo MD;  Location: [facility] ENDO;  Service: Endoscopy;  Laterality: N/A;    COLONOSCOPY N/A 2022    Procedure: COLONOSCOPY;  Surgeon: Alix Puente MD;  Location: [facility] ENDO;  Service: Endoscopy;  Laterality: N/A;    COLONOSCOPY N/A 2023    Procedure: COLONOSCOPY - rule out CMV  Cardiac clearance/Eliquis hold approval received on 23 per Dr. Meade, cardiology.  Note in encounters.  LB;  Surgeon: Daniella Shah MD;  Location: [facility] ENDO;  Service: Endoscopy;  Laterality: N/A;    ESOPHAGOGASTRODUODENOSCOPY N/A 2024    Procedure: EGD (ESOPHAGOGASTRODUODENOSCOPY) 3/1-pt cleared, ok to hold eliquis for 3 days;  Surgeon: Daniella Shah MD;  Location: [facility] ENDO;  Service: Endoscopy;  Laterality: N/A;    KIDNEY TRANSPLANT      RETINAL LASER PROCEDURE       Family History   Problem Relation Name Age of Onset    Diabetes Mother      Hypertension Mother      Heart failure Mother      Heart failure Father      Kidney disease Sister          ESRD    Diabetes Sister      Diabetes Maternal Grandmother      Cancer Neg Hx       Social History     Socioeconomic History    Marital status:     Number of children: 2   Occupational History    Occupation: retired     Employer: Retired   Tobacco Use    Smoking status: Former     Current packs/day: 0.00     Types: Cigarettes     Quit date: 2013     Years since quittin.1     Passive exposure: Past    Smokeless tobacco: Former     Quit date: 2013    Tobacco comments:     used marijuana since [id number], stopped after started dialysis   Substance and Sexual Activity    Alcohol use: No     Alcohol/week: 0.0 standard drinks of alcohol    Drug use: Not  "Currently     Comment:      Sexual activity: Never   Social History Narrative    . Lives with spouse. Has 2 children. Patient retired as  for [employer]. He has been washing cars.     Social Determinants of Health     Financial Resource Strain: Low Risk  (10/2/2020)    Overall Financial Resource Strain (CARDIA)     Difficulty of Paying Living Expenses: Not hard at all   Transportation Needs: No Transportation Needs (10/2/2020)    PRAPARE - Transportation     Lack of Transportation (Medical): No     Lack of Transportation (Non-Medical): No   Stress: No Stress Concern Present (10/2/2020)    [employer] - Occupational Stress Questionnaire     Feeling of Stress : Not at all     Medication List with Changes/Refills   Current Medications    AMITRIPTYLINE (ELAVIL) 25 MG TABLET    Take 1 tablet (25 mg total) by mouth nightly as needed for Insomnia.    APIXABAN (ELIQUIS) 5 MG TAB    Take 1 tablet (5 mg total) by mouth 2 (two) times daily.    ASPIRIN (ECOTRIN) 81 MG EC TABLET    Take 1 tablet by mouth Daily.     BD ULTRA-FINE MINI PEN NEEDLE 31 GAUGE X 3/16" NDLE    Use to inject insulin as needed up to 4 times daily    BLOOD SUGAR DIAGNOSTIC (CONTOUR NEXT TEST STRIPS) STRP    by Misc.(Non-Drug; Combo Route) route 4 (four) times daily before meals and nightly.    BLOOD-GLUCOSE METER (FREESTYLE LITE METER) KIT    1 each 4 (four) times daily.    CALCITRIOL (ROCALTROL) 0.25 MCG CAP    Take 1 capsule (0.25 mcg total) by mouth once daily.    COVID [id number],12UP,,ANDU,,PF, (SPIKEVAX [id number],12Y UP,,PF,) 50 MCG/0.5 ML INJECTION    Inject into the muscle.    FAMOTIDINE (PEPCID) 20 MG TABLET    TAKE ONE TABLET BY MOUTH EVERY EVENING    FERROUS SULFATE (FEOSOL) 325 MG (65 MG IRON) TAB TABLET    Take 1 tablet (325 mg total) by mouth daily with breakfast.    FISH OIL-OMEGA-3 FATTY ACIDS 300-1,000 MG CAPSULE    Take 1 g by mouth once daily.    FLUORIDE, SODIUM, (PREVIDENT) 1.1 % GEL  "   use as directed to brush teeth daily    FUROSEMIDE (LASIX) 80 MG TABLET    Take one-half tablet (40 mg) by mouth 2 (two) times daily.    GABAPENTIN (NEURONTIN) 100 MG CAPSULE    Take 1 capsule (100 mg total) by mouth 3 (three) times daily.    GLIMEPIRIDE (AMARYL) 2 MG TABLET    Take 1 tablet (2 mg total) by mouth before breakfast.    HYDROCODONE-ACETAMINOPHEN (NORCO) 5-325 MG PER TABLET    Take 1 tablet by mouth every 6 (six) hours as needed for Pain.    INSULIN ASPART U-100 (NOVOLOG FLEXPEN U-100 INSULIN) 100 UNIT/ML (3 ML) INPN PEN    Inject 25 Units into the skin 3 (three) times daily with meals.    INSULIN GLARGINE (LANTUS U-100 INSULIN) 100 UNIT/ML INJECTION    Inject 35 Units into the skin 2 (two) times a day.    INSULIN GLARGINE U-100, LANTUS, (LANTUS SOLOSTAR U-100 INSULIN) 100 UNIT/ML (3 ML) INPN PEN    Inject 35 Units into the skin 2 (two) times daily.    KETOCONAZOLE (NIZORAL) 200 MG TAB    Take HALF A tablet (100 mg total) by mouth once daily.    LANCETS (MICROLET LANCET) MISC    100 lancets by Misc.(Non-Drug; Combo Route) route 4 (four) times daily before meals and nightly.    MAGNESIUM OXIDE (MAG-OX) 400 MG (241.3 MG MAGNESIUM) TABLET    Take 1 tablet (400 mg total) by mouth once daily.    METOPROLOL SUCCINATE (TOPROL-XL) 25 MG 24 HR TABLET    Take 1 tablet (25 mg total) by mouth once daily.    MONTELUKAST (SINGULAIR) 10 MG TABLET    Take 1 tablet (10 mg total) by mouth every evening.    MULTIVITAMIN (THERAGRAN) TABLET    Take 1 tablet by mouth once daily.    MYCOPHENOLATE (CELLCEPT) 250 MG CAP    Take 2 capsules (500 mg total) by mouth 2 (two) times daily.    ONDANSETRON (ZOFRAN) 4 MG TABLET    Take 1 tablet (4 mg total) by mouth every 6 (six) hours.    POTASSIUM CHLORIDE SA (K-DUR,KLOR-CON) 20 MEQ TABLET    Take 2 tablets (40 mEq total) by mouth once daily.    PREDNISONE (DELTASONE) 5 MG TABLET    Take 1 tablet (5 mg total) by mouth once daily.    PROMETHAZINE-DEXTROMETHORPHAN (PROMETHAZINE-DM)  6.25-15 MG/5 ML SYRP    Take 5 mLs by mouth every 6 (six) hours as needed (cough).    ROSUVASTATIN (CRESTOR) 40 MG TAB    Take 1 tablet (40 mg total) by mouth once daily.    SACUBITRIL-VALSARTAN (ENTRESTO)  MG PER TABLET    Take 1 tablet by mouth 2 (two) times daily.    SEMAGLUTIDE (OZEMPIC) 1 MG/DOSE (4 MG/3 ML)    Inject 1 mg into the skin every 7 days. Call office in 2 months to increase    TACROLIMUS (PROGRAF) 1 MG CAP    Take 5 capsules (5 mg) by mouth every morning and 4 capsules (4 mg) by mouth every evening (9 mg per day total).    TAMSULOSIN (FLOMAX) 0.4 MG CAP    Take 1 capsule (0.4 mg total) by mouth once daily.    TRAMADOL (ULTRAM) 50 MG TABLET    Take 1 tablet (50 mg total) by mouth every 4 (four) hours as needed for Pain.    TRIAMCINOLONE ACETONIDE 0.1% (KENALOG) 0.1 % OINTMENT    Apply topically 2 (two) times daily. Use on bilateral lower legs.     Review of patient's allergies indicates:   Allergen Reactions    Lisinopril Other (See Comments)     Other reaction(s):  cough    Actos  [pioglitazone] Other (See Comments)     Other reaction(s): CHF    Metformin Other (See Comments)     Other reaction(s): renal insuff  Other reaction(s): CHF     Review of Systems   Constitutional: Negative for malaise/fatigue.   HENT:  Negative for hearing loss.    Eyes:  Positive for visual disturbance. Negative for double vision.   Cardiovascular:  Positive for chest pain and syncope.   Respiratory:  Positive for sleep disturbances due to breathing. Negative for shortness of breath.    Endocrine: Negative for cold intolerance.   Hematologic/Lymphatic: Does not bruise/bleed easily.   Skin:  Negative for poor wound healing and suspicious lesions.   Musculoskeletal:  Positive for falls and neck pain. Negative for gout, joint pain and joint swelling.   Gastrointestinal:  Positive for diarrhea. Negative for nausea and vomiting.   Genitourinary:  Positive for frequency, hesitancy, incomplete emptying and nocturia.  Negative for dysuria.   Neurological:  Positive for dizziness, numbness, paresthesias and sensory change.   Psychiatric/Behavioral:  Negative for depression, memory loss and substance abuse. The patient is not nervous/anxious.    Allergic/Immunologic: Negative for persistent infections.       Objective:   Body mass index is 43.98 kg/m².  There were no vitals filed for this visit.             General    Constitutional: He is oriented to person, place, and time. He appears well-developed and well-nourished. No distress.   HENT:   Head: Normocephalic.   Eyes: EOM are normal.   Pulmonary/Chest: Effort normal.   Neurological: He is oriented to person, place, and time.   Psychiatric: He has a normal mood and affect.         Left Hand/Wrist Exam     Inspection   Scars: Wrist - present Hand -  present  Effusion: Wrist - present Hand -  present    Pain   Wrist - The patient exhibits pain of the lateral epicondyle.    Other     Sensory Exam  Median Distribution: normal  Ulnar Distribution: normal  Radial Distribution: normal    Comments:  The suture lines intact left carpal tunnel.  There is no sign of infection.  There are no motor or sensory deficits.  He has tenderness about the left elbow lateral epicondyle with pain on resisted wrist extension and finger extension          Vascular Exam       Capillary Refill  Left Hand: normal capillary refill         radiographs were not obtained today  Assessment:     Encounter Diagnoses   Name Primary?    Bilateral carpal tunnel syndrome Yes    Lateral epicondylitis, left elbow         Plan:     The patient was injected left elbow lateral epicondyle with 1 cc Kenalog and 1 cc 2% plain lidocaine.  A Band-Aid was applied to the left carpal tunnel.  He was given a wrist splint.  He will return in 1 week for suture removal.  He will wait at least 3 months between injections.                Disclaimer: This note was prepared using a voice recognition system and is likely to have sound  alike errors within the text.

## 2024-07-26 NOTE — TELEPHONE ENCOUNTER
I spoke with patient this morning regarding all of his results:    Free light chains indeed demonstrated elevated Kappa which I expected but the degree of elevation observed requires evaluation by Hematology.  Urine and serum immuno testing without monoclonal band    PYP scan negative for TTR amyloidosis    I had wanted to repeat ECHO to assess strain pattern but he did not have echo repeated--last one July 2021 with thickened walls reported.  I reviewed images and measured IVS 1.2 cm; hard to measure PW but measurement reported includes MV support structures and septum RV trabeculations.  Grossly does not demonstrate findings of infiltrative CM.    He prefers to have everything done in [place].  I will place Hematology consult.      I would recommend strain imaging on repeat echo    If Hematologist is concerned re: AL then heart biopsy indicated unless other less invasive measures demonstrate amyloidosis.  Happy to arrange for this in expedited fashion if needed.  Please contact me on cell [phone] if heart biopsy desired or any questions arise.    My recommendations re: up-titration of meds in original consult note    Reconsult prn     This note being sent to Dr. Ham (primary) and Dr. Muhammad (Cardiogist)  
There are no Wet Read(s) to document.

## 2024-07-31 ENCOUNTER — OFFICE VISIT (OUTPATIENT)
Dept: ORTHOPEDICS | Facility: CLINIC | Age: 69
End: 2024-07-31
Payer: COMMERCIAL

## 2024-07-31 VITALS — WEIGHT: 272.5 LBS | BODY MASS INDEX: 43.79 KG/M2 | HEIGHT: 66 IN

## 2024-07-31 DIAGNOSIS — G56.03 BILATERAL CARPAL TUNNEL SYNDROME: Primary | ICD-10-CM

## 2024-07-31 PROCEDURE — 3066F NEPHROPATHY DOC TX: CPT | Mod: CPTII,S$GLB,, | Performed by: ORTHOPAEDIC SURGERY

## 2024-07-31 PROCEDURE — 1125F AMNT PAIN NOTED PAIN PRSNT: CPT | Mod: CPTII,S$GLB,, | Performed by: ORTHOPAEDIC SURGERY

## 2024-07-31 PROCEDURE — 3288F FALL RISK ASSESSMENT DOCD: CPT | Mod: CPTII,S$GLB,, | Performed by: ORTHOPAEDIC SURGERY

## 2024-07-31 PROCEDURE — 3051F HG A1C>EQUAL 7.0%<8.0%: CPT | Mod: CPTII,S$GLB,, | Performed by: ORTHOPAEDIC SURGERY

## 2024-07-31 PROCEDURE — 1160F RVW MEDS BY RX/DR IN RCRD: CPT | Mod: CPTII,S$GLB,, | Performed by: ORTHOPAEDIC SURGERY

## 2024-07-31 PROCEDURE — 1101F PT FALLS ASSESS-DOCD LE1/YR: CPT | Mod: CPTII,S$GLB,, | Performed by: ORTHOPAEDIC SURGERY

## 2024-07-31 PROCEDURE — 99024 POSTOP FOLLOW-UP VISIT: CPT | Mod: S$GLB,,, | Performed by: ORTHOPAEDIC SURGERY

## 2024-07-31 PROCEDURE — 99999 PR PBB SHADOW E&M-EST. PATIENT-LVL IV: CPT | Mod: PBBFAC,,, | Performed by: ORTHOPAEDIC SURGERY

## 2024-07-31 PROCEDURE — 4010F ACE/ARB THERAPY RXD/TAKEN: CPT | Mod: CPTII,S$GLB,, | Performed by: ORTHOPAEDIC SURGERY

## 2024-07-31 PROCEDURE — 1159F MED LIST DOCD IN RCRD: CPT | Mod: CPTII,S$GLB,, | Performed by: ORTHOPAEDIC SURGERY

## 2024-07-31 NOTE — PROGRESS NOTES
Subjective:     Patient ID: Mitch Whittaker is a 68 y.o. male.    Chief Complaint: Pain and Post-op Evaluation of the Left Hand      HPI:  The patient is a 68-year-old male status post left carpal tunnel release date of surgery was 07/18/2024.  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 RNA NEGATIVE 5/2015, 2021, 2022    History of colon polyps     HPTH (hyperparathyroidism)     Hyperlipidemia     Hypertension associated with stage 3 chronic kidney disease due to type 2 diabetes mellitus     LBBB (left bundle branch block) 12/20/2021    Morbid obesity with BMI of 45.0-49.9, adult     Nephrolithiasis 6/7/2013    PCO (posterior capsular opacification), left 03/04/2019    Proteinuria     resolved s/p transplant    S/P kidney transplant     Sleep apnea     Type 2 diabetes, uncontrolled, with retinopathy     Type II diabetes mellitus with renal manifestations      Past Surgical History:   Procedure Laterality Date    CARDIAC CATHETERIZATION  01/01/2008    normal coronary    CARPAL TUNNEL RELEASE Right 12/01/2023    Procedure: RELEASE, CARPAL TUNNEL;  Surgeon: Noel Almonte MD;  Location: [facility];  Service: Orthopedics;  Laterality: Right;     CARPAL TUNNEL RELEASE Left 2024    Procedure: RELEASE, CARPAL TUNNEL;  Surgeon: Noel Almonte MD;  Location: [facility] OR;  Service: Orthopedics;  Laterality: Left;    COLONOSCOPY N/A 2018    Procedure: COLONOSCOPY;  Surgeon: Chava Ronquillo MD;  Location: [facility] ENDO;  Service: Endoscopy;  Laterality: N/A;    COLONOSCOPY N/A 2022    Procedure: COLONOSCOPY;  Surgeon: Alix Puente MD;  Location: [facility] ENDO;  Service: Endoscopy;  Laterality: N/A;    COLONOSCOPY N/A 2023    Procedure: COLONOSCOPY - rule out CMV  Cardiac clearance/Eliquis hold approval received on 23 per Dr. Meade, cardiology.  Note in encounters.  LB;  Surgeon: Daniella Shah MD;  Location: [facility] ENDO;  Service: Endoscopy;  Laterality: N/A;    ESOPHAGOGASTRODUODENOSCOPY N/A 2024    Procedure: EGD (ESOPHAGOGASTRODUODENOSCOPY) 3/1-pt cleared, ok to hold eliquis for 3 days;  Surgeon: Daniella Shah MD;  Location: [facility] ENDO;  Service: Endoscopy;  Laterality: N/A;    KIDNEY TRANSPLANT      RETINAL LASER PROCEDURE       Family History   Problem Relation Name Age of Onset    Diabetes Mother      Hypertension Mother      Heart failure Mother      Heart failure Father      Kidney disease Sister          ESRD    Diabetes Sister      Diabetes Maternal Grandmother      Cancer Neg Hx       Social History     Socioeconomic History    Marital status:     Number of children: 2   Occupational History    Occupation: retired     Employer: Retired   Tobacco Use    Smoking status: Former     Current packs/day: 0.00     Types: Cigarettes     Quit date: 2013     Years since quittin.1     Passive exposure: Past    Smokeless tobacco: Former     Quit date: 2013    Tobacco comments:     used marijuana since 6084-2316, stopped after started dialysis   Substance and Sexual Activity    Alcohol use: No     Alcohol/week: 0.0 standard drinks of alcohol    Drug use: Not Currently     Comment:      Sexual  "activity: Never   Social History Narrative    . Lives with spouse. Has 2 children. Patient retired as  for [employer]. He has been washing cars.     Social Determinants of Health     Financial Resource Strain: Low Risk  (10/2/2020)    Overall Financial Resource Strain (CARDIA)     Difficulty of Paying Living Expenses: Not hard at all   Transportation Needs: No Transportation Needs (10/2/2020)    PRAPARE - Transportation     Lack of Transportation (Medical): No     Lack of Transportation (Non-Medical): No   Stress: No Stress Concern Present (10/2/2020)    [employer] [place] [employer] - Occupational Stress Questionnaire     Feeling of Stress : Not at all     Medication List with Changes/Refills   Current Medications    AMITRIPTYLINE (ELAVIL) 25 MG TABLET    Take 1 tablet (25 mg total) by mouth nightly as needed for Insomnia.    APIXABAN (ELIQUIS) 5 MG TAB    Take 1 tablet (5 mg total) by mouth 2 (two) times daily.    ASPIRIN (ECOTRIN) 81 MG EC TABLET    Take 1 tablet by mouth Daily.     BD ULTRA-FINE MINI PEN NEEDLE 31 GAUGE X 3/16" NDLE    Use to inject insulin as needed up to 4 times daily    BLOOD SUGAR DIAGNOSTIC (CONTOUR NEXT TEST STRIPS) STRP    by Misc.(Non-Drug; Combo Route) route 4 (four) times daily before meals and nightly.    BLOOD-GLUCOSE METER (FREESTYLE LITE METER) KIT    1 each 4 (four) times daily.    CALCITRIOL (ROCALTROL) 0.25 MCG CAP    Take 1 capsule (0.25 mcg total) by mouth once daily.    COVID [id number],12UP,,ANDU,,PF, (SPIKEVAX 1996-0773,12Y UP,,PF,) 50 MCG/0.5 ML INJECTION    Inject into the muscle.    FAMOTIDINE (PEPCID) 20 MG TABLET    TAKE ONE TABLET BY MOUTH EVERY EVENING    FERROUS SULFATE (FEOSOL) 325 MG (65 MG IRON) TAB TABLET    Take 1 tablet (325 mg total) by mouth daily with breakfast.    FISH OIL-OMEGA-3 FATTY ACIDS 300-1,000 MG CAPSULE    Take 1 g by mouth once daily.    FLUORIDE, SODIUM, (PREVIDENT) 1.1 % GEL    use as directed to brush teeth " daily    FUROSEMIDE (LASIX) 80 MG TABLET    Take one-half tablet (40 mg) by mouth 2 (two) times daily.    GABAPENTIN (NEURONTIN) 100 MG CAPSULE    Take 1 capsule (100 mg total) by mouth 3 (three) times daily.    GLIMEPIRIDE (AMARYL) 2 MG TABLET    Take 1 tablet (2 mg total) by mouth before breakfast.    HYDROCODONE-ACETAMINOPHEN (NORCO) 5-325 MG PER TABLET    Take 1 tablet by mouth every 6 (six) hours as needed for Pain.    INSULIN ASPART U-100 (NOVOLOG FLEXPEN U-100 INSULIN) 100 UNIT/ML (3 ML) INPN PEN    Inject 25 Units into the skin 3 (three) times daily with meals.    INSULIN GLARGINE (LANTUS U-100 INSULIN) 100 UNIT/ML INJECTION    Inject 35 Units into the skin 2 (two) times a day.    INSULIN GLARGINE U-100, LANTUS, (LANTUS SOLOSTAR U-100 INSULIN) 100 UNIT/ML (3 ML) INPN PEN    Inject 35 Units into the skin 2 (two) times daily.    KETOCONAZOLE (NIZORAL) 200 MG TAB    Take HALF A tablet (100 mg total) by mouth once daily.    LANCETS (MICROLET LANCET) MISC    100 lancets by Misc.(Non-Drug; Combo Route) route 4 (four) times daily before meals and nightly.    MAGNESIUM OXIDE (MAG-OX) 400 MG (241.3 MG MAGNESIUM) TABLET    Take 1 tablet (400 mg total) by mouth once daily.    METOPROLOL SUCCINATE (TOPROL-XL) 25 MG 24 HR TABLET    Take 1 tablet (25 mg total) by mouth once daily.    MONTELUKAST (SINGULAIR) 10 MG TABLET    Take 1 tablet (10 mg total) by mouth every evening.    MULTIVITAMIN (THERAGRAN) TABLET    Take 1 tablet by mouth once daily.    MYCOPHENOLATE (CELLCEPT) 250 MG CAP    Take 2 capsules (500 mg total) by mouth 2 (two) times daily.    ONDANSETRON (ZOFRAN) 4 MG TABLET    Take 1 tablet (4 mg total) by mouth every 6 (six) hours.    POTASSIUM CHLORIDE SA (K-DUR,KLOR-CON) 20 MEQ TABLET    Take 2 tablets (40 mEq total) by mouth once daily.    PREDNISONE (DELTASONE) 5 MG TABLET    Take 1 tablet (5 mg total) by mouth once daily.    PROMETHAZINE-DEXTROMETHORPHAN (PROMETHAZINE-DM) 6.25-15 MG/5 ML SYRP    Take 5 mLs  by mouth every 6 (six) hours as needed (cough).    ROSUVASTATIN (CRESTOR) 40 MG TAB    Take 1 tablet (40 mg total) by mouth once daily.    SACUBITRIL-VALSARTAN (ENTRESTO)  MG PER TABLET    Take 1 tablet by mouth 2 (two) times daily.    SEMAGLUTIDE (OZEMPIC) 1 MG/DOSE (4 MG/3 ML)    Inject 1 mg into the skin every 7 days. Call office in 2 months to increase    TACROLIMUS (PROGRAF) 1 MG CAP    Take 5 capsules (5 mg) by mouth every morning and 4 capsules (4 mg) by mouth every evening (9 mg per day total).    TAMSULOSIN (FLOMAX) 0.4 MG CAP    Take 1 capsule (0.4 mg total) by mouth once daily.    TRAMADOL (ULTRAM) 50 MG TABLET    Take 1 tablet (50 mg total) by mouth every 4 (four) hours as needed for Pain.    TRIAMCINOLONE ACETONIDE 0.1% (KENALOG) 0.1 % OINTMENT    Apply topically 2 (two) times daily. Use on bilateral lower legs.     Review of patient's allergies indicates:   Allergen Reactions    Lisinopril Other (See Comments)     Other reaction(s):  cough    Actos  [pioglitazone] Other (See Comments)     Other reaction(s): CHF    Metformin Other (See Comments)     Other reaction(s): renal insuff  Other reaction(s): CHF     Review of Systems   Constitutional: Negative for malaise/fatigue.   HENT:  Negative for hearing loss.    Eyes:  Positive for visual disturbance. Negative for double vision.   Cardiovascular:  Negative for chest pain.   Respiratory:  Positive for sleep disturbances due to breathing. Negative for shortness of breath.    Endocrine: Negative for cold intolerance.   Hematologic/Lymphatic: Does not bruise/bleed easily.   Skin:  Negative for poor wound healing and suspicious lesions.   Musculoskeletal:  Positive for falls and neck pain. Negative for gout, joint pain and joint swelling.   Gastrointestinal:  Positive for diarrhea. Negative for nausea and vomiting.   Genitourinary:  Positive for frequency, hesitancy, incomplete emptying and nocturia. Negative for dysuria.   Neurological:  Positive for  dizziness, numbness, paresthesias and sensory change.   Psychiatric/Behavioral:  Negative for depression, memory loss and substance abuse. The patient is not nervous/anxious.    Allergic/Immunologic: Negative for persistent infections.       Objective:   Body mass index is 43.98 kg/m².  There were no vitals filed for this visit.             General    Constitutional: He is oriented to person, place, and time. He appears well-developed and well-nourished. No distress.   HENT:   Head: Normocephalic.   Eyes: EOM are normal.   Pulmonary/Chest: Effort normal.   Neurological: He is oriented to person, place, and time.   Psychiatric: He has a normal mood and affect.         Left Hand/Wrist Exam     Inspection   Scars: Wrist - present Hand -  present  Effusion: Wrist - absent Hand -  absent    Other     Sensory Exam  Median Distribution: normal  Ulnar Distribution: normal  Radial Distribution: normal    Comments:  The suture line is intact left carpal tunnel incision.  There is no sign of infection.  There are no motor or sensory deficits.          Vascular Exam       Capillary Refill  Left Hand: normal capillary refill      radiographs were not obtained today  Assessment:     Encounter Diagnosis   Name Primary?    Bilateral carpal tunnel syndrome Yes        Plan:       The patient had the sutures removed today.  Steri-Strips were applied.  Wound care was discussed.  He seems to be doing well will return on an as needed basis.              Disclaimer: This note was prepared using a voice recognition system and is likely to have sound alike errors within the text.

## 2024-08-06 DIAGNOSIS — I50.42 CHRONIC COMBINED SYSTOLIC AND DIASTOLIC CONGESTIVE HEART FAILURE: Primary | ICD-10-CM

## 2024-08-12 RX ORDER — INSULIN ASPART 100 [IU]/ML
25 INJECTION, SOLUTION INTRAVENOUS; SUBCUTANEOUS
Qty: 30 ML | Refills: 0 | Status: SHIPPED | OUTPATIENT
Start: 2024-08-12

## 2024-08-12 NOTE — TELEPHONE ENCOUNTER
I have never seen patient, saw Dr. Kowalski March 2023, last refill approved in Dr. Kowalski' absence  Has appt w Dr. Caal this month, ensure he keeps appt  Will approve refill x1mth only as I do not want him to be without

## 2024-08-12 NOTE — TELEPHONE ENCOUNTER
Refill Routing Note   Medication(s) are not appropriate for processing by [facility] for the following reason(s):        Non-participating provider    [facility] action(s):  Route               Appointments  past 12m or future 3m with PCP    Date Provider   Last Visit   Visit date not found Michelle Cody PA-C   Next Visit   Visit date not found Michelle Cody PA-C   ED visits in past 90 days: 0        Note composed:12:34 AM 08/12/2024

## 2024-08-22 ENCOUNTER — OFFICE VISIT (OUTPATIENT)
Dept: CARDIOLOGY | Facility: CLINIC | Age: 69
End: 2024-08-22
Payer: COMMERCIAL

## 2024-08-22 ENCOUNTER — HOSPITAL ENCOUNTER (OUTPATIENT)
Dept: CARDIOLOGY | Facility: HOSPITAL | Age: 69
Discharge: HOME | End: 2024-08-22
Attending: INTERNAL MEDICINE
Payer: COMMERCIAL

## 2024-08-22 VITALS
WEIGHT: 275.38 LBS | HEIGHT: 66 IN | BODY MASS INDEX: 44.26 KG/M2 | DIASTOLIC BLOOD PRESSURE: 86 MMHG | SYSTOLIC BLOOD PRESSURE: 152 MMHG | HEART RATE: 95 BPM | RESPIRATION RATE: 16 BRPM | OXYGEN SATURATION: 98 %

## 2024-08-22 DIAGNOSIS — I25.118 CORONARY ARTERY DISEASE OF NATIVE ARTERY OF NATIVE HEART WITH STABLE ANGINA PECTORIS: ICD-10-CM

## 2024-08-22 DIAGNOSIS — E66.01 MORBID OBESITY WITH BMI OF 40.0-44.9, ADULT: ICD-10-CM

## 2024-08-22 DIAGNOSIS — I44.7 LBBB (LEFT BUNDLE BRANCH BLOCK): ICD-10-CM

## 2024-08-22 DIAGNOSIS — E66.01 SEVERE OBESITY (BMI >= 40): ICD-10-CM

## 2024-08-22 DIAGNOSIS — Z79.01 CHRONIC ANTICOAGULATION: ICD-10-CM

## 2024-08-22 DIAGNOSIS — Z79.4 TYPE 2 DIABETES MELLITUS WITH OTHER SPECIFIED COMPLICATION, WITH LONG-TERM CURRENT USE OF INSULIN: ICD-10-CM

## 2024-08-22 DIAGNOSIS — I50.42 CHRONIC COMBINED SYSTOLIC AND DIASTOLIC CONGESTIVE HEART FAILURE: ICD-10-CM

## 2024-08-22 DIAGNOSIS — G47.33 OSA (OBSTRUCTIVE SLEEP APNEA): ICD-10-CM

## 2024-08-22 DIAGNOSIS — I87.2 CHRONIC VENOUS INSUFFICIENCY OF LOWER EXTREMITY: ICD-10-CM

## 2024-08-22 DIAGNOSIS — I50.42 CHRONIC COMBINED SYSTOLIC AND DIASTOLIC CONGESTIVE HEART FAILURE: Primary | ICD-10-CM

## 2024-08-22 DIAGNOSIS — Z86.718 HISTORY OF DVT (DEEP VEIN THROMBOSIS): ICD-10-CM

## 2024-08-22 DIAGNOSIS — E66.01 CLASS 3 SEVERE OBESITY DUE TO EXCESS CALORIES WITH SERIOUS COMORBIDITY AND BODY MASS INDEX (BMI) OF 40.0 TO 44.9 IN ADULT: ICD-10-CM

## 2024-08-22 DIAGNOSIS — Z86.16 HISTORY OF COVID-19: ICD-10-CM

## 2024-08-22 DIAGNOSIS — E11.22 HYPERTENSION ASSOCIATED WITH STAGE 3 CHRONIC KIDNEY DISEASE DUE TO TYPE 2 DIABETES MELLITUS: ICD-10-CM

## 2024-08-22 DIAGNOSIS — I12.9 HYPERTENSION ASSOCIATED WITH STAGE 3 CHRONIC KIDNEY DISEASE DUE TO TYPE 2 DIABETES MELLITUS: ICD-10-CM

## 2024-08-22 DIAGNOSIS — Z94.0 KIDNEY TRANSPLANT STATUS, LIVING RELATED DONOR: ICD-10-CM

## 2024-08-22 DIAGNOSIS — G47.33 OBSTRUCTIVE SLEEP APNEA: ICD-10-CM

## 2024-08-22 DIAGNOSIS — I82.592 CHRONIC DEEP VEIN THROMBOSIS (DVT) OF OTHER VEIN OF LEFT LOWER EXTREMITY: ICD-10-CM

## 2024-08-22 DIAGNOSIS — E11.69 TYPE 2 DIABETES MELLITUS WITH OTHER SPECIFIED COMPLICATION, WITH LONG-TERM CURRENT USE OF INSULIN: ICD-10-CM

## 2024-08-22 DIAGNOSIS — N18.30 HYPERTENSION ASSOCIATED WITH STAGE 3 CHRONIC KIDNEY DISEASE DUE TO TYPE 2 DIABETES MELLITUS: ICD-10-CM

## 2024-08-22 DIAGNOSIS — N18.30 STAGE 3 CHRONIC KIDNEY DISEASE, UNSPECIFIED WHETHER STAGE 3A OR 3B CKD: ICD-10-CM

## 2024-08-22 DIAGNOSIS — I49.3 PVC'S (PREMATURE VENTRICULAR CONTRACTIONS): ICD-10-CM

## 2024-08-22 LAB
OHS QRS DURATION: 140 MS
OHS QTC CALCULATION: 497 MS

## 2024-08-22 PROCEDURE — G2211 COMPLEX E/M VISIT ADD ON: HCPCS | Mod: S$GLB,,, | Performed by: INTERNAL MEDICINE

## 2024-08-22 PROCEDURE — 1160F RVW MEDS BY RX/DR IN RCRD: CPT | Mod: CPTII,S$GLB,, | Performed by: INTERNAL MEDICINE

## 2024-08-22 PROCEDURE — 4010F ACE/ARB THERAPY RXD/TAKEN: CPT | Mod: CPTII,S$GLB,, | Performed by: INTERNAL MEDICINE

## 2024-08-22 PROCEDURE — 3008F BODY MASS INDEX DOCD: CPT | Mod: CPTII,S$GLB,, | Performed by: INTERNAL MEDICINE

## 2024-08-22 PROCEDURE — 99999 PR PBB SHADOW E&M-EST. PATIENT-LVL V: CPT | Mod: PBBFAC,,, | Performed by: INTERNAL MEDICINE

## 2024-08-22 PROCEDURE — 3079F DIAST BP 80-89 MM HG: CPT | Mod: CPTII,S$GLB,, | Performed by: INTERNAL MEDICINE

## 2024-08-22 PROCEDURE — 3288F FALL RISK ASSESSMENT DOCD: CPT | Mod: CPTII,S$GLB,, | Performed by: INTERNAL MEDICINE

## 2024-08-22 PROCEDURE — 1159F MED LIST DOCD IN RCRD: CPT | Mod: CPTII,S$GLB,, | Performed by: INTERNAL MEDICINE

## 2024-08-22 PROCEDURE — 93005 ELECTROCARDIOGRAM TRACING: CPT

## 2024-08-22 PROCEDURE — 3051F HG A1C>EQUAL 7.0%<8.0%: CPT | Mod: CPTII,S$GLB,, | Performed by: INTERNAL MEDICINE

## 2024-08-22 PROCEDURE — 3077F SYST BP >= 140 MM HG: CPT | Mod: CPTII,S$GLB,, | Performed by: INTERNAL MEDICINE

## 2024-08-22 PROCEDURE — 3066F NEPHROPATHY DOC TX: CPT | Mod: CPTII,S$GLB,, | Performed by: INTERNAL MEDICINE

## 2024-08-22 PROCEDURE — 93010 ELECTROCARDIOGRAM REPORT: CPT | Mod: ,,, | Performed by: INTERNAL MEDICINE

## 2024-08-22 PROCEDURE — 99214 OFFICE O/P EST MOD 30 MIN: CPT | Mod: S$GLB,,, | Performed by: INTERNAL MEDICINE

## 2024-08-22 PROCEDURE — 1101F PT FALLS ASSESS-DOCD LE1/YR: CPT | Mod: CPTII,S$GLB,, | Performed by: INTERNAL MEDICINE

## 2024-08-22 RX ORDER — SEMAGLUTIDE 2.68 MG/ML
2 INJECTION, SOLUTION SUBCUTANEOUS
Qty: 6 ML | Refills: 1 | Status: SHIPPED | OUTPATIENT
Start: 2024-08-22 | End: 2025-08-22

## 2024-08-22 NOTE — PROGRESS NOTES
Subjective:   Patient ID:  Mitch Whittaker is a 68 y.o. male who presents for cardiac consult of No chief complaint on file.      The patient came in today for cardiac consult of No chief complaint on file.      Mitch Whittaker is a 68 y.o. male with current medical conditions non obs CAD, h/o COVID 19, DVT on Eliquis, Obesity,  HFrEF 45-50%, CKD, DM2, MARION, HTN, HLD, ESRD s/p renal transplant presents for follow up CV eval.     2/22/24  Started Ozempic last visit. BMI 44 - 277 lbs.   He has not lost much weight yet. He had low BP once and improved with fluid/chips.   He has low iron will f/u hemeonc.   ECG - sinus tach , V 101, st TWI - inferolat old      5/2/24  PT will need carpal tunnel surgery with Dr. Almonte.   He is s/p EGD for anemia workup,  A1c is improving.   BP and HR stable. BMI 45 - 280 lbs   He has occ worsening LE edema - told to increase lasix PRN and elevate legs.   He occ eats out.     8/22/24  BP elevated mildly. HR 90s. BMI 44 - 275 lbs       Conclusion 10/2023         Predominant Rhythm Sinus rhythm Heart rates varied between 55 and 145 BPM with an average of 93 BPM.    There were occasional PVCs totalling 661 and averaging 13.77 per hour. (0.3%)    There were frequent PACs totalling 4131 and averaging 86.06 per hour. (1.6%)    On review, no evidence of afib observed on the recorded rhythm strips.    Results for orders placed during the hospital encounter of 08/22/23    Echo    Interpretation Summary    Left Ventricle: The left ventricle is normal in size. Moderately increased ventricular mass. Moderately increased wall thickness. Normal wall motion. There is low normal systolic function with a visually estimated ejection fraction of 50 - 55%. There is normal diastolic function.    Left Atrium: Left atrium is severely dilated.    Right Ventricle: Normal right ventricular cavity size. Wall thickness is normal. Right ventricle wall motion  is normal. Systolic function is normal.    Right Atrium:  Right atrium is dilated.    IVC/SVC: Normal venous pressure at 3 mmHg.      Results for orders placed during the hospital encounter of 10/10/23    Nuclear Stress - Cardiology Interpreted    Interpretation Summary    Normal myocardial perfusion scan. There is no evidence of myocardial ischemia or infarction.    There is a mild intensity perfusion abnormality in the anteroapical wall of the left ventricle, secondary to breast attenuation.    The gated perfusion images showed an ejection fraction of 41% at rest. The gated perfusion images showed an ejection fraction of 54% post stress.    The ECG portion of the study is uninterpretable due to left bundle branch block.    The patient reported no chest pain during the stress test.    Pt experienced abdominal discomfort post infusion. No chest pain pre, during or post stress test      LE US 12/2019  Age Indeterminate non occlusive thrombus of the Posterior Tibial Vein.  CONCLUSIONS   Technically difficult study.   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 RNA NEGATIVE 5/2015, 2021, 2022    History of colon polyps     HPTH (hyperparathyroidism)      Hyperlipidemia     Hypertension associated with stage 3 chronic kidney disease due to type 2 diabetes mellitus     LBBB (left bundle branch block) 12/20/2021    Morbid obesity with BMI of 45.0-49.9, adult     Nephrolithiasis 6/7/2013    PCO (posterior capsular opacification), left 03/04/2019    Proteinuria     resolved s/p transplant    S/P kidney transplant     Sleep apnea     Type 2 diabetes, uncontrolled, with retinopathy     Type II diabetes mellitus with renal manifestations        Past Surgical History:   Procedure Laterality Date    CARDIAC CATHETERIZATION  01/01/2008    normal coronary    CARPAL TUNNEL RELEASE Right 12/01/2023    Procedure: RELEASE, CARPAL TUNNEL;  Surgeon: Noel Almonte MD;  Location: [facility] OR;  Service: Orthopedics;  Laterality: Right;    CARPAL TUNNEL RELEASE Left 7/18/2024    Procedure: RELEASE, CARPAL TUNNEL;  Surgeon: Noel Almonte MD;  Location: [facility] OR;  Service: Orthopedics;  Laterality: Left;    COLONOSCOPY N/A 04/05/2018    Procedure: COLONOSCOPY;  Surgeon: Chava Ronquillo MD;  Location: [facility] ENDO;  Service: Endoscopy;  Laterality: N/A;    COLONOSCOPY N/A 05/02/2022    Procedure: COLONOSCOPY;  Surgeon: Alix Puente MD;  Location: [facility] ENDO;  Service: Endoscopy;  Laterality: N/A;    COLONOSCOPY N/A 06/07/2023    Procedure: COLONOSCOPY - rule out CMV  Cardiac clearance/Eliquis hold approval received on 05/21/23 per Dr. Meade, cardiology.  Note in encounters.  LB;  Surgeon: Daniella Shah MD;  Location: [facility];  Service: Endoscopy;  Laterality: N/A;    ESOPHAGOGASTRODUODENOSCOPY N/A 03/26/2024    Procedure: EGD (ESOPHAGOGASTRODUODENOSCOPY) 3/1-pt cleared, ok to hold eliquis for 3 days;  Surgeon: Daniella Shah MD;  Location: [facility];  Service: Endoscopy;  Laterality: N/A;    KIDNEY TRANSPLANT  2015    RETINAL LASER PROCEDURE         Social History     Tobacco Use    Smoking status: Former     Current packs/day: 0.00     Types: Cigarettes      "Quit date: 2013     Years since quittin.2     Passive exposure: Past    Smokeless tobacco: Former     Quit date: 2013    Tobacco comments:     used marijuana since [id number], stopped after started dialysis   Substance Use Topics    Alcohol use: No     Alcohol/week: 0.0 standard drinks of alcohol    Drug use: Not Currently     Comment:         Family History   Problem Relation Name Age of Onset    Diabetes Mother      Hypertension Mother      Heart failure Mother      Heart failure Father      Kidney disease Sister          ESRD    Diabetes Sister      Diabetes Maternal Grandmother      Cancer Neg Hx         Patient's Medications   New Prescriptions    No medications on file   Previous Medications    AMITRIPTYLINE (ELAVIL) 25 MG TABLET    Take 1 tablet (25 mg total) by mouth nightly as needed for Insomnia.    APIXABAN (ELIQUIS) 5 MG TAB    Take 1 tablet (5 mg total) by mouth 2 (two) times daily.    ASPIRIN (ECOTRIN) 81 MG EC TABLET    Take 1 tablet by mouth Daily.     BD ULTRA-FINE MINI PEN NEEDLE 31 GAUGE X 3/16" NDLE    Use to inject insulin as needed up to 4 times daily    BLOOD SUGAR DIAGNOSTIC (CONTOUR NEXT TEST STRIPS) STRP    by Misc.(Non-Drug; Combo Route) route 4 (four) times daily before meals and nightly.    BLOOD-GLUCOSE METER (FREESTYLE LITE METER) KIT    1 each 4 (four) times daily.    CALCITRIOL (ROCALTROL) 0.25 MCG CAP    Take 1 capsule (0.25 mcg total) by mouth once daily.    COVID [id number],12UP,,ANDU,,PF, (SPIKEVAX 6081-1315,12Y UP,,PF,) 50 MCG/0.5 ML INJECTION    Inject into the muscle.    FAMOTIDINE (PEPCID) 20 MG TABLET    TAKE ONE TABLET BY MOUTH EVERY EVENING    FERROUS SULFATE (FEOSOL) 325 MG (65 MG IRON) TAB TABLET    Take 1 tablet (325 mg total) by mouth daily with breakfast.    FISH OIL-OMEGA-3 FATTY ACIDS 300-1,000 MG CAPSULE    Take 1 g by mouth once daily.    FLUORIDE, SODIUM, (PREVIDENT) 1.1 % GEL    use as directed to brush teeth daily    FUROSEMIDE (LASIX) 80 MG TABLET  "   Take one-half tablet (40 mg) by mouth 2 (two) times daily.    GABAPENTIN (NEURONTIN) 100 MG CAPSULE    Take 1 capsule (100 mg total) by mouth 3 (three) times daily.    GLIMEPIRIDE (AMARYL) 2 MG TABLET    Take 1 tablet (2 mg total) by mouth before breakfast.    HYDROCODONE-ACETAMINOPHEN (NORCO) 5-325 MG PER TABLET    Take 1 tablet by mouth every 6 (six) hours as needed for Pain.    INSULIN ASPART U-100 (NOVOLOG FLEXPEN U-100 INSULIN) 100 UNIT/ML (3 ML) INPN PEN    Inject 25 Units into the skin 3 (three) times daily with meals.    INSULIN GLARGINE (LANTUS U-100 INSULIN) 100 UNIT/ML INJECTION    Inject 35 Units into the skin 2 (two) times a day.    INSULIN GLARGINE U-100, LANTUS, (LANTUS SOLOSTAR U-100 INSULIN) 100 UNIT/ML (3 ML) INPN PEN    Inject 35 Units into the skin 2 (two) times daily.    KETOCONAZOLE (NIZORAL) 200 MG TAB    Take HALF A tablet (100 mg total) by mouth once daily.    LANCETS (MICROLET LANCET) MISC    100 lancets by Misc.(Non-Drug; Combo Route) route 4 (four) times daily before meals and nightly.    MAGNESIUM OXIDE (MAG-OX) 400 MG (241.3 MG MAGNESIUM) TABLET    Take 1 tablet (400 mg total) by mouth once daily.    METOPROLOL SUCCINATE (TOPROL-XL) 25 MG 24 HR TABLET    Take 1 tablet (25 mg total) by mouth once daily.    MONTELUKAST (SINGULAIR) 10 MG TABLET    Take 1 tablet (10 mg total) by mouth every evening.    MULTIVITAMIN (THERAGRAN) TABLET    Take 1 tablet by mouth once daily.    MYCOPHENOLATE (CELLCEPT) 250 MG CAP    Take 2 capsules (500 mg total) by mouth 2 (two) times daily.    ONDANSETRON (ZOFRAN) 4 MG TABLET    Take 1 tablet (4 mg total) by mouth every 6 (six) hours.    POTASSIUM CHLORIDE SA (K-DUR,KLOR-CON) 20 MEQ TABLET    Take 2 tablets (40 mEq total) by mouth once daily.    PREDNISONE (DELTASONE) 5 MG TABLET    Take 1 tablet (5 mg total) by mouth once daily.    PROMETHAZINE-DEXTROMETHORPHAN (PROMETHAZINE-DM) 6.25-15 MG/5 ML SYRP    Take 5 mLs by mouth every 6 (six) hours as needed  (cough).    ROSUVASTATIN (CRESTOR) 40 MG TAB    Take 1 tablet (40 mg total) by mouth once daily.    SACUBITRIL-VALSARTAN (ENTRESTO)  MG PER TABLET    Take 1 tablet by mouth 2 (two) times daily.    SEMAGLUTIDE (OZEMPIC) 1 MG/DOSE (4 MG/3 ML)    Inject 1 mg into the skin every 7 days. Call office in 2 months to increase    TACROLIMUS (PROGRAF) 1 MG CAP    Take 5 capsules (5 mg) by mouth every morning and 4 capsules (4 mg) by mouth every evening (9 mg per day total).    TAMSULOSIN (FLOMAX) 0.4 MG CAP    Take 1 capsule (0.4 mg total) by mouth once daily.    TRAMADOL (ULTRAM) 50 MG TABLET    Take 1 tablet (50 mg total) by mouth every 4 (four) hours as needed for Pain.    TRIAMCINOLONE ACETONIDE 0.1% (KENALOG) 0.1 % OINTMENT    Apply topically 2 (two) times daily. Use on bilateral lower legs.   Modified Medications    No medications on file   Discontinued Medications    No medications on file       Review of Systems   Constitutional:  Positive for malaise/fatigue.   HENT: Negative.     Eyes: Negative.    Respiratory:  Positive for cough and shortness of breath.    Cardiovascular:  Positive for leg swelling. Negative for chest pain and palpitations.   Gastrointestinal: Negative.    Genitourinary: Negative.    Musculoskeletal:  Positive for back pain.   Skin: Negative.    Neurological:  Positive for dizziness.   Endo/Heme/Allergies: Negative.    Psychiatric/Behavioral: Negative.     All 12 systems otherwise negative.      Wt Readings from Last 3 Encounters:   07/31/24 123.6 kg (272 lb 7.8 oz)   07/23/24 123.6 kg (272 lb 7.8 oz)   07/18/24 123.6 kg (272 lb 7.8 oz)     Temp Readings from Last 3 Encounters:   07/18/24 98.3 °F (36.8 °C) (Temporal)   06/18/24 98.4 °F (36.9 °C) (Temporal)   03/26/24 97.9 °F (36.6 °C) (Skin)     BP Readings from Last 3 Encounters:   07/18/24 (!) 106/54   06/24/24 98/60   06/18/24 116/70     Pulse Readings from Last 3 Encounters:   07/18/24 100   06/24/24 95   06/18/24 96       There were no  vitals taken for this visit.    Objective:   Physical Exam  Vitals and nursing note reviewed.   Constitutional:       General: He is not in acute distress.     Appearance: He is well-developed. He is obese. He is not diaphoretic.   HENT:      Head: Normocephalic and atraumatic.      Nose: Nose normal.   Eyes:      General: No scleral icterus.     Conjunctiva/sclera: Conjunctivae normal.   Neck:      Thyroid: No thyromegaly.      Vascular: No JVD.   Cardiovascular:      Rate and Rhythm: Normal rate and regular rhythm.      Heart sounds: S1 normal and S2 normal. Murmur heard.      No friction rub. No gallop. No S3 or S4 sounds.   Pulmonary:      Effort: Pulmonary effort is normal. No respiratory distress.      Breath sounds: Normal breath sounds. No stridor. No wheezing or rales.   Chest:      Chest wall: No tenderness.   Abdominal:      General: Bowel sounds are normal. There is no distension.      Palpations: Abdomen is soft. There is no mass.      Tenderness: There is no abdominal tenderness. There is no rebound.   Genitourinary:     Comments: Deferred  Musculoskeletal:         General: No tenderness or deformity. Normal range of motion.      Cervical back: Normal range of motion and neck supple.      Right lower leg: Edema present.      Left lower leg: Edema present.   Lymphadenopathy:      Cervical: No cervical adenopathy.   Skin:     General: Skin is warm and dry.      Coloration: Skin is not pale.      Findings: No erythema or rash.   Neurological:      Mental Status: He is alert and oriented to person, place, and time.      Motor: No abnormal muscle tone.      Coordination: Coordination normal.   Psychiatric:         Behavior: Behavior normal.         Thought Content: Thought content normal.         Judgment: Judgment normal.         Lab Results   Component Value Date     06/18/2024    K 4.4 06/18/2024     06/18/2024    CO2 26 06/18/2024    BUN 29 (H) 06/18/2024    CREATININE 2.5 (H) 06/18/2024      (H) 06/18/2024    HGBA1C 7.4 (H) 06/18/2024    MG 2.1 03/03/2022    AST 21 01/29/2024    ALT 30 01/29/2024    ALBUMIN 3.3 (L) 01/29/2024    PROT 6.5 01/29/2024    BILITOT 0.4 01/29/2024    WBC 3.87 (L) 06/18/2024    HGB 11.2 (L) 06/18/2024    HCT 37.5 (L) 06/18/2024    HCT 36 06/12/2015    MCV 87 06/18/2024     06/18/2024    INR 1.0 06/18/2015    TSH 0.999 03/11/2022    CHOL 201 (H) 12/20/2023    HDL 55 12/20/2023    LDLCALC 128.4 12/20/2023    TRIG 88 12/20/2023     (H) 08/22/2023     Assessment:      1. Chronic combined systolic and diastolic congestive heart failure    2. PVC's (premature ventricular contractions)    3. Chronic deep vein thrombosis (DVT) of other vein of left lower extremity    4. Morbid obesity with BMI of 40.0-44.9, adult    5. Class 3 severe obesity due to excess calories with serious comorbidity and body mass index (BMI) of 40.0 to 44.9 in adult    6. LBBB (left bundle branch block)    7. Coronary artery disease of native artery of native heart with stable angina pectoris    8. History of DVT (deep vein thrombosis)    9. Chronic anticoagulation    10. Living-related donor kidney transplant (daughter) - 6/12/15    11. Hypertension associated with stage 3 chronic kidney disease due to type 2 diabetes mellitus    12. Obstructive sleep apnea    13. MARION (obstructive sleep apnea)    14. Severe obesity (BMI >= 40)    15. Type 2 diabetes mellitus with other specified complication, with long-term current use of insulin    16. Stage 3 chronic kidney disease, unspecified whether stage 3a or 3b CKD    17. Chronic venous insufficiency of lower extremity    18. History of COVID-19          Plan:    1. Chronic CHF EF 40-45% ; neg for TTR amyloidosis, occ more edema at times   - cont lasix, and extra PRN  - told to double up PRN   - titrate Toprol and Entresto;  - cont low salt diet, fluid restriction  - Nuc stress neg for ischemia 10/2023, EF 40-45% at rest.   - ECHO 10/2023 with low  normal LV function, normal RV function.   - Holter 10/2023 with occ PVCs, freq PACs, AVG HR 93 bpm, neg for Afib.     2. HTN with mild edema, PVCs, PACs - occ low BP   - Holter 10/2023 with occ PVCs, freq PACs, AVG HR 93 bpm, neg for Afib.   - cont meds for afterload reduction   - low salt diet  - rec compression stockings  - stop hydralazine   - cont BB    3. HLD  - cont statin    4. H/o ESRD s/p Transplant with CKD4 now  - f/u with nephro, repeat labs and adjust tx per nephro    5. CAD, non obs  - cont meds  - Nuc stress neg for ischemia 10/2023, EF 40-45% at rest.     6. MARION  -had CPAP - needs CPAP    7. Obesity BMI 44 - 285 lbs --> 41 - 267-->  BMI 43 - 268 lbs.  --> 275 lbs   - cont weight loss with diet/exercise     8. H/o DVT, chronic DVT  - cont Eliquis 5 BID  - non occlusive thrombus in PTV in left but no thrombus in POP vein on left.    9. Dizziness, h/o syncope with headaches  - f/u Neuro   - likely sec to Flomax and Proscar, can discuss with urology  - monitor BP as well    10. DM2 - A1c -9.3 --> 8.0 --> 7.5 --> 6.8 --> 7.1 --> 8.1 --> 7.3  - titrate meds   - cont Ozempic low dose and titrate  - increase to 0.5 mg  --> 1 mg  --> increase to 2mg     11. H/O COVID 19  - long covid sx now  - cont supportive tx    12. FE def  - f/u hemeonc , s/p GI workup    13. Pre-OP CV evaluation prior to carpal tunnel surgery with Dr. Almonte.   Moderately elevated periop risk of CV events for moderate risk procedure..  Ok to proceed to the scheduled surgery without further cardiac study.  OK to hold Eliquis 2 days before the procedure and resume ASAP postop.  Continue Metoprolol and Statin periop.    Visit today included increased complexity associated with the care of the episodic problem CHF addressed and managing the longitudinal care of the patient due to the serious and/or complex managed problem(s) .    Thank you for allowing me to participate in this patient's care. Please do not hesitate to contact me with any  questions or concerns. Consult note has been forwarded to the referral physician.     Micah Muhammad MD, [facility]  Cardiovascular Disease  [facility], [place]  [phone] (P)

## 2024-08-25 DIAGNOSIS — I50.42 CHRONIC COMBINED SYSTOLIC AND DIASTOLIC CONGESTIVE HEART FAILURE: ICD-10-CM

## 2024-08-26 ENCOUNTER — OFFICE VISIT (OUTPATIENT)
Dept: INTERNAL MEDICINE | Facility: CLINIC | Age: 69
End: 2024-08-26
Payer: COMMERCIAL

## 2024-08-26 ENCOUNTER — TELEPHONE (OUTPATIENT)
Dept: DIABETES | Facility: CLINIC | Age: 69
End: 2024-08-26
Payer: COMMERCIAL

## 2024-08-26 VITALS
DIASTOLIC BLOOD PRESSURE: 66 MMHG | HEIGHT: 66 IN | WEIGHT: 272.69 LBS | OXYGEN SATURATION: 99 % | RESPIRATION RATE: 18 BRPM | SYSTOLIC BLOOD PRESSURE: 124 MMHG | BODY MASS INDEX: 43.82 KG/M2 | HEART RATE: 95 BPM

## 2024-08-26 DIAGNOSIS — R05.3 CHRONIC COUGH: ICD-10-CM

## 2024-08-26 DIAGNOSIS — I50.42 CHRONIC COMBINED SYSTOLIC AND DIASTOLIC CONGESTIVE HEART FAILURE: ICD-10-CM

## 2024-08-26 DIAGNOSIS — E11.649 UNCONTROLLED TYPE 2 DIABETES MELLITUS WITH HYPOGLYCEMIA WITHOUT COMA: ICD-10-CM

## 2024-08-26 DIAGNOSIS — Z76.89 ENCOUNTER TO ESTABLISH CARE WITH NEW DOCTOR: Primary | ICD-10-CM

## 2024-08-26 PROCEDURE — G2211 COMPLEX E/M VISIT ADD ON: HCPCS | Mod: S$GLB,,, | Performed by: FAMILY MEDICINE

## 2024-08-26 PROCEDURE — 1160F RVW MEDS BY RX/DR IN RCRD: CPT | Mod: CPTII,S$GLB,, | Performed by: FAMILY MEDICINE

## 2024-08-26 PROCEDURE — 3288F FALL RISK ASSESSMENT DOCD: CPT | Mod: CPTII,S$GLB,, | Performed by: FAMILY MEDICINE

## 2024-08-26 PROCEDURE — 3074F SYST BP LT 130 MM HG: CPT | Mod: CPTII,S$GLB,, | Performed by: FAMILY MEDICINE

## 2024-08-26 PROCEDURE — 99999 PR PBB SHADOW E&M-EST. PATIENT-LVL IV: CPT | Mod: PBBFAC,,, | Performed by: FAMILY MEDICINE

## 2024-08-26 PROCEDURE — 3078F DIAST BP <80 MM HG: CPT | Mod: CPTII,S$GLB,, | Performed by: FAMILY MEDICINE

## 2024-08-26 PROCEDURE — 1126F AMNT PAIN NOTED NONE PRSNT: CPT | Mod: CPTII,S$GLB,, | Performed by: FAMILY MEDICINE

## 2024-08-26 PROCEDURE — 3066F NEPHROPATHY DOC TX: CPT | Mod: CPTII,S$GLB,, | Performed by: FAMILY MEDICINE

## 2024-08-26 PROCEDURE — 4010F ACE/ARB THERAPY RXD/TAKEN: CPT | Mod: CPTII,S$GLB,, | Performed by: FAMILY MEDICINE

## 2024-08-26 PROCEDURE — 3008F BODY MASS INDEX DOCD: CPT | Mod: CPTII,S$GLB,, | Performed by: FAMILY MEDICINE

## 2024-08-26 PROCEDURE — 1159F MED LIST DOCD IN RCRD: CPT | Mod: CPTII,S$GLB,, | Performed by: FAMILY MEDICINE

## 2024-08-26 PROCEDURE — 99214 OFFICE O/P EST MOD 30 MIN: CPT | Mod: S$GLB,,, | Performed by: FAMILY MEDICINE

## 2024-08-26 PROCEDURE — 1101F PT FALLS ASSESS-DOCD LE1/YR: CPT | Mod: CPTII,S$GLB,, | Performed by: FAMILY MEDICINE

## 2024-08-26 PROCEDURE — 3051F HG A1C>EQUAL 7.0%<8.0%: CPT | Mod: CPTII,S$GLB,, | Performed by: FAMILY MEDICINE

## 2024-08-26 RX ORDER — SACUBITRIL AND VALSARTAN 97; 103 MG/1; MG/1
1 TABLET, FILM COATED ORAL 2 TIMES DAILY
Qty: 180 TABLET | Refills: 1 | Status: SHIPPED | OUTPATIENT
Start: 2024-08-26

## 2024-08-26 RX ORDER — SACUBITRIL AND VALSARTAN 97; 103 MG/1; MG/1
1 TABLET, FILM COATED ORAL 2 TIMES DAILY
Qty: 180 TABLET | Refills: 1 | Status: SHIPPED | OUTPATIENT
Start: 2024-08-26 | End: 2024-08-26 | Stop reason: SDUPTHER

## 2024-08-26 RX ORDER — INSULIN ASPART 100 [IU]/ML
25 INJECTION, SOLUTION INTRAVENOUS; SUBCUTANEOUS
Qty: 24 ML | Refills: 0 | Status: SHIPPED | OUTPATIENT
Start: 2024-08-26 | End: 2024-09-25

## 2024-08-26 RX ORDER — INSULIN GLARGINE 100 [IU]/ML
25 INJECTION, SOLUTION SUBCUTANEOUS 2 TIMES DAILY
Qty: 15 ML | Refills: 0 | Status: SHIPPED | OUTPATIENT
Start: 2024-08-26 | End: 2024-09-25

## 2024-08-26 NOTE — TELEPHONE ENCOUNTER
----- Message from Burton Silvestre sent at 8/26/2024 11:55 AM CDT -----  Contact: Mitch  .Type:  RX Refill Request    Who Called:  Mitch   Refill or New Rx: Refill   RX Name and Strength: apixaban (ELIQUIS) 5 mg Tab  How is the patient currently taking it? (ex. 1XDay): 1 x 2 day   Is this a 30 day or 90 day RX: 90 day     Refill or New Rx: Refill   RX Name and Strength: sacubitriL-valsartan (ENTRESTO)  mg per tablet  How is the patient currently taking it? (ex. 1XDay): 1 x 2 day   Is this a 30 day or 90 day RX: 90 day     Preferred Pharmacy with phone number:   [facility]  [id number] [place] [address] [address]  [facility] [place] [address]  Phone: [phone] Fax: [phone]    Local or Mail Order: Local   Ordering Provider: Micah Muhammad   Would the patient rather a call back or a response via [facility]?  Call back   Best Call Back Number: .[phone] (home)      Additional Information:  pt states he is completely out of the medication Entroesto and has a few tablets left of Eliquis and would like to  today at the [facility] location on today due to having a 1pm appt at that location     Thanks

## 2024-08-27 NOTE — PROGRESS NOTES
Subjective:       Patient ID: Mitch Whittaker is a 68 y.o. male.    Chief Complaint: Establish Care      History of Present Illness    Patient presents to clinic today to establish care.  - Patient presents for medication refills  - Management of diabetes  - Has not seen a PCP in approximately one year  - Issues with blood sugar control  - Low readings particularly in the morning  - Blood sugar reading of 50 mg/dL upon waking recently  - Hypoglycemia issue began after starting Ozempic  - Ozempic dose increased by Dr. Muhammad, Cardiology  - On a complex insulin regimen  - Takes Lantus 30 units twice daily  - Takes NovoLog 25 units 3 times daily with meals  - Manages hypoglycemic episodes by consuming orange juice  - Decreased food intake due to Ozempic's effects  - Persistent cough for about 3-4 years  - Leg swelling due to poor circulation, has seen Cardiology regarding this  - Follows up with multiple specialists    MEDICATIONS:  - Lantus (insulin glargine) 30 units twice daily  - NovoLog (insulin aspart) 25 units 3 times daily with meals  - Ozempic (semaglutide) for diabetes management  - Glimepiride for diabetes management    MEDICAL HISTORY:  - Diabetes: Poorly controlled  - Chronic kidney disease: Following with nephrologist, approaching need for dialysis  - Heart condition: Following with cardiologist Dr. Muhammad  - Anemia: Following with Dr. Bojorquez, hematology  - Kidney transplant: 2015  - Poor circulation    SURGICAL HISTORY:  - Kidney transplant: 2015  - Hand surgery: Seen by Dr. Almonte in Orthopedics    TEST RESULTS:  - A1C: last check, not well controlled  Blood glucose: Recent, variable readings, with some low readings (50 mg/dL mentioned)      Review of Systems   Constitutional:  Negative for chills, fatigue, fever and unexpected weight change.   HENT:  Positive for dental problem, hearing loss and rhinorrhea. Negative for congestion, ear pain and trouble swallowing.    Eyes:  Positive for pain and visual  disturbance.   Respiratory:  Positive for cough and shortness of breath.    Cardiovascular:  Positive for leg swelling. Negative for chest pain and palpitations.   Gastrointestinal:  Positive for diarrhea. Negative for abdominal distention, abdominal pain, blood in stool, constipation, nausea and vomiting.   Genitourinary:  Negative for difficulty urinating, scrotal swelling and testicular pain.   Musculoskeletal:  Positive for arthralgias. Negative for myalgias.   Skin:  Negative for rash.   Neurological:  Positive for dizziness, weakness and headaches. Negative for numbness.   Hematological:  Negative for adenopathy. Does not bruise/bleed easily.   Psychiatric/Behavioral:  Positive for sleep disturbance. Negative for dysphoric mood. The patient is not nervous/anxious.        Above positive ROS are chronic/stable per patient report.  Objective:      Physical Exam  Vitals reviewed.   Constitutional:       General: He is not in acute distress.     Appearance: He is well-developed.   HENT:      Head: Normocephalic and atraumatic.   Eyes:      General: Lids are normal. No scleral icterus.     Extraocular Movements: Extraocular movements intact.      Conjunctiva/sclera: Conjunctivae normal.      Pupils: Pupils are equal, round, and reactive to light.   Pulmonary:      Effort: Pulmonary effort is normal.   Neurological:      Mental Status: He is alert and oriented to person, place, and time.      Gait: Gait normal.   Psychiatric:         Mood and Affect: Mood and affect normal.         Assessment:       1. Encounter to establish care with new doctor    2. Uncontrolled type 2 diabetes mellitus with hypoglycemia without coma    3. Chronic cough        Plan:   1. Encounter to establish care with new doctor    2. Uncontrolled type 2 diabetes mellitus with hypoglycemia without coma  -     Ambulatory referral/consult to Diabetic Advanced Practice Providers (Medical Management); Future; Expected date: 09/02/2024  -     insulin  aspart U-100 (NOVOLOG FLEXPEN U-100 INSULIN) 100 unit/mL (3 mL) InPn pen; Inject 25 Units into the skin 3 (three) times daily with meals.  Dispense: 24 mL; Refill: 0  -     insulin glargine U-100, Lantus, (LANTUS SOLOSTAR U-100 INSULIN) 100 unit/mL (3 mL) InPn pen; Inject 25 Units into the skin 2 (two) times daily.  Dispense: 15 mL; Refill: 0     Patient has complex medical history including diabetes, kidney transplant, and heart issues   A1C not well controlled despite elevated doses of insulin   Recent addition of Ozempic likely affecting insulin requirements   Experiencing hypoglycemic episodes, particularly in the mornings   Educated on the importance of optimal diabetes control for kidney health.   Discussed the impact of Ozempic on insulin requirements and blood sugar.   Patient to monitor blood sugar closely.   Patient to hold insulin doses if blood sugar is low.   Decreased Lantus from 30 units to 25 units twice daily.   Continued NovoLog at current dose (25 units 3 times daily with meals).   Discontinued Glimepiride due to risk of hypoglycemic episodes.   Referred to diabetic team for management and potential continuous glucose monitoring.    3. Chronic cough  -     Ambulatory referral/consult to Pulmonology; Future; Expected date: 09/02/2024     Chronic cough for 3-4 years warrants further investigation   Referred to lung specialist for evaluation of chronic cough.    Patient expressed understanding and agreement with plan.    Visit today included increased complexity associated with the care of the episodic problem uncontrolled diabetes, which was addressed while instituting co-management of the longitudinal care of the patient due to the serious and/or complex managed problem(s) .    I have evaluated and discussed management associated with medical care services that serve as the continuing focal point for all needed health care services and/or with medical care services that are part of ongoing care  related to my patient's single, serious condition or a complex condition(s).    I am providing ongoing care and I am the primary care provider for this patient, and they are being managed, monitored, and/or observed for their chronic conditions over time.     I have addressed their ongoing health maintenance requirements and needs for all health care services and reviewed co-management plans provided by specialty providers when available.    Health Maintenance Due   Topic Date Due    RSV Vaccine (Age 60+ and Pregnant patients) (1 - 1-dose 60+ series) Never done    COVID-19 Vaccine (4 - 2023-24 season) 09/01/2023    Foot Exam  10/10/2023    Diabetes Urine Screening  06/21/2024     Follow up in about 3 months (around 11/26/2024), or if symptoms worsen or fail to improve, for EP/f/u diabetes, continue est care.    This note was generated with the assistance of ambient listening technology. Verbal consent was obtained by the patient and accompanying visitor(s) for the recording of patient appointment to facilitate this note. I attest to having reviewed and edited the generated note for accuracy, though some syntax or spelling errors may persist. Please contact the author of this note for any clarification.

## 2024-08-27 NOTE — TELEPHONE ENCOUNTER
----- Message from Valery Caal MD sent at 8/26/2024 10:12 PM CDT -----  Please assist with scheduling pulm appt

## 2024-08-28 ENCOUNTER — OFFICE VISIT (OUTPATIENT)
Dept: PULMONOLOGY | Facility: CLINIC | Age: 69
End: 2024-08-28
Payer: COMMERCIAL

## 2024-08-28 VITALS
RESPIRATION RATE: 16 BRPM | BODY MASS INDEX: 44.42 KG/M2 | HEIGHT: 66 IN | HEART RATE: 116 BPM | OXYGEN SATURATION: 96 % | WEIGHT: 276.38 LBS | DIASTOLIC BLOOD PRESSURE: 70 MMHG | SYSTOLIC BLOOD PRESSURE: 125 MMHG

## 2024-08-28 DIAGNOSIS — J41.1 MUCOPURULENT CHRONIC BRONCHITIS: Primary | ICD-10-CM

## 2024-08-28 DIAGNOSIS — R91.1 SOLITARY PULMONARY NODULE: ICD-10-CM

## 2024-08-28 DIAGNOSIS — E11.9 TYPE 2 DIABETES MELLITUS WITHOUT COMPLICATION: ICD-10-CM

## 2024-08-28 DIAGNOSIS — R05.8 ALLERGIC COUGH: ICD-10-CM

## 2024-08-28 DIAGNOSIS — R05.3 CHRONIC COUGH: ICD-10-CM

## 2024-08-28 DIAGNOSIS — R09.02 EXERCISE HYPOXEMIA: ICD-10-CM

## 2024-08-28 DIAGNOSIS — E66.01 CLASS 3 SEVERE OBESITY DUE TO EXCESS CALORIES WITH SERIOUS COMORBIDITY AND BODY MASS INDEX (BMI) OF 40.0 TO 44.9 IN ADULT: ICD-10-CM

## 2024-08-28 DIAGNOSIS — J44.1 COPD EXACERBATION: ICD-10-CM

## 2024-08-28 PROCEDURE — 3066F NEPHROPATHY DOC TX: CPT | Mod: CPTII,S$GLB,, | Performed by: INTERNAL MEDICINE

## 2024-08-28 PROCEDURE — 3008F BODY MASS INDEX DOCD: CPT | Mod: CPTII,S$GLB,, | Performed by: INTERNAL MEDICINE

## 2024-08-28 PROCEDURE — 3078F DIAST BP <80 MM HG: CPT | Mod: CPTII,S$GLB,, | Performed by: INTERNAL MEDICINE

## 2024-08-28 PROCEDURE — 1159F MED LIST DOCD IN RCRD: CPT | Mod: CPTII,S$GLB,, | Performed by: INTERNAL MEDICINE

## 2024-08-28 PROCEDURE — 99215 OFFICE O/P EST HI 40 MIN: CPT | Mod: 25,S$GLB,, | Performed by: INTERNAL MEDICINE

## 2024-08-28 PROCEDURE — 3051F HG A1C>EQUAL 7.0%<8.0%: CPT | Mod: CPTII,S$GLB,, | Performed by: INTERNAL MEDICINE

## 2024-08-28 PROCEDURE — 4010F ACE/ARB THERAPY RXD/TAKEN: CPT | Mod: CPTII,S$GLB,, | Performed by: INTERNAL MEDICINE

## 2024-08-28 PROCEDURE — 3288F FALL RISK ASSESSMENT DOCD: CPT | Mod: CPTII,S$GLB,, | Performed by: INTERNAL MEDICINE

## 2024-08-28 PROCEDURE — 99999 PR PBB SHADOW E&M-EST. PATIENT-LVL V: CPT | Mod: PBBFAC,,, | Performed by: INTERNAL MEDICINE

## 2024-08-28 PROCEDURE — 3074F SYST BP LT 130 MM HG: CPT | Mod: CPTII,S$GLB,, | Performed by: INTERNAL MEDICINE

## 2024-08-28 PROCEDURE — 1101F PT FALLS ASSESS-DOCD LE1/YR: CPT | Mod: CPTII,S$GLB,, | Performed by: INTERNAL MEDICINE

## 2024-08-28 RX ORDER — PREDNISONE 20 MG/1
TABLET ORAL
Qty: 20 TABLET | Refills: 0 | Status: SHIPPED | OUTPATIENT
Start: 2024-08-28

## 2024-08-28 RX ORDER — AZITHROMYCIN 250 MG/1
TABLET, FILM COATED ORAL
Qty: 6 TABLET | Refills: 0 | Status: SHIPPED | OUTPATIENT
Start: 2024-08-28

## 2024-08-28 RX ORDER — ALBUTEROL SULFATE 90 UG/1
2 INHALANT RESPIRATORY (INHALATION) EVERY 4 HOURS PRN
Qty: 8.5 G | Refills: 11 | Status: SHIPPED | OUTPATIENT
Start: 2024-08-28

## 2024-08-28 RX ORDER — MONTELUKAST SODIUM 10 MG/1
10 TABLET ORAL NIGHTLY
Qty: 90 TABLET | Refills: 3 | Status: SHIPPED | OUTPATIENT
Start: 2024-08-28

## 2024-08-28 RX ORDER — ALBUTEROL SULFATE 0.83 MG/ML
2.5 SOLUTION RESPIRATORY (INHALATION)
Qty: 360 ML | Refills: 11 | Status: SHIPPED | OUTPATIENT
Start: 2024-08-28 | End: 2025-08-28

## 2024-08-28 NOTE — PROGRESS NOTES
"Subjective:     Patient ID: Mitch Whittaker is a 68 y.o. male.    Chief Complaint:      HPI 68 y.o. former smoker , s/p renal transplant with cough for 3 years  COPD  He presents for evaluation and treatment of COPD. The patient is currently having symptoms / an exacerbation. Current symptoms include chronic dyspnea, cough productive of clear sputum in small amounts, and wheezing. Symptoms have been present since several months ago and have been gradually worsening. He denies chills and fever. Associated symptoms include fatigue, poor exercise tolerance, and shortness of breath.  This episode appears to have been triggered by no identifiable factor. Treatments tried for the current exacerbation: none. The patient has been having similar episodes for approximately 10 years. He uses 4 pillows at night.- sleeps in a recliner Patient currently is not on home oxygen therapy.. The patient is having no constitutional symptoms, denying fever, chills, anorexia, or weight loss. The patient has not been hospitalized for this condition before. He has a history of 20 -25 pack years. The patient is experiencing exercise intolerance (difficulty walking 3 blocks on flat ground).    Cough for 3 - 4 years  No known precipitating factors, thought it was "hay fever" initially  Coughs everyday, worse when he gets hot  Sometimes wakes up coughing  Sometimes with clear sputum  Has runny nose  Tried nasal spray with no benefit  Has Singulair on medication list, patient is not sure if he takes this  Has tried promethazine with some relief  Quit smoking 20 years ago    Takes pepcid for reflux    Obstructive Sleep Apnea on Continuous Positive Airway Pressure:  Patient is not using CPAP as prescribed and not benefiting from therapy. Patient has complaints of has no machine  Sleeps in a recliner  History of MARION, has not had CPAP since 2016  Sleep study in 2012  Kidney transplant, takes prograf  Had COVID 1/2022      Past Medical History: "   Diagnosis Date    Acquired renal cyst of left kidney     Anemia associated with chronic renal failure     CAD (coronary artery disease)     nonobstructive [facility] 9/14    CHF (congestive heart failure)     Chronic immunosuppression with Prograf and MMF 06/18/2015    Chronic venous insufficiency of lower extremity     CKD (chronic kidney disease) stage 3, GFR 30-59 ml/min     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 RNA NEGATIVE 5/2015, 2021, 2022    History of colon polyps     HPTH (hyperparathyroidism)     Hyperlipidemia     Hypertension associated with stage 3 chronic kidney disease due to type 2 diabetes mellitus     LBBB (left bundle branch block) 12/20/2021    Morbid obesity with BMI of 45.0-49.9, adult     Nephrolithiasis 6/7/2013    PCO (posterior capsular opacification), left 03/04/2019    Proteinuria     resolved s/p transplant    S/P kidney transplant     Sleep apnea     Type 2 diabetes, uncontrolled, with retinopathy     Type II diabetes mellitus with renal manifestations      Past Surgical History:   Procedure Laterality Date    CARDIAC CATHETERIZATION  01/01/2008    normal coronary    CARPAL TUNNEL RELEASE Right 12/01/2023    Procedure: RELEASE, CARPAL TUNNEL;  Surgeon: Noel Almonte MD;  Location: [facility] OR;  Service: Orthopedics;  Laterality: Right;    CARPAL TUNNEL RELEASE Left 7/18/2024    Procedure: RELEASE, CARPAL TUNNEL;  Surgeon: Noel Almonte MD;  Location: [facility] OR;  Service: Orthopedics;  Laterality: Left;    COLONOSCOPY N/A 04/05/2018    Procedure: COLONOSCOPY;  Surgeon: Chava Ronquillo MD;  Location: [facility] ENDO;  Service: Endoscopy;   "Laterality: N/A;    COLONOSCOPY N/A 05/02/2022    Procedure: COLONOSCOPY;  Surgeon: Alix Puente MD;  Location: [facility];  Service: Endoscopy;  Laterality: N/A;    COLONOSCOPY N/A 06/07/2023    Procedure: COLONOSCOPY - rule out CMV  Cardiac clearance/Eliquis hold approval received on 05/21/23 per Dr. Meade, cardiology.  Note in encounters.  LB;  Surgeon: Daniella Shah MD;  Location: [facility] ENDO;  Service: Endoscopy;  Laterality: N/A;    ESOPHAGOGASTRODUODENOSCOPY N/A 03/26/2024    Procedure: EGD (ESOPHAGOGASTRODUODENOSCOPY) 3/1-pt cleared, ok to hold eliquis for 3 days;  Surgeon: Daniella Shah MD;  Location: [facility];  Service: Endoscopy;  Laterality: N/A;    KIDNEY TRANSPLANT  2015    RETINAL LASER PROCEDURE       Review of patient's allergies indicates:   Allergen Reactions    Lisinopril Other (See Comments)     Other reaction(s):  cough    Actos  [pioglitazone] Other (See Comments)     Other reaction(s): CHF    Metformin Other (See Comments)     Other reaction(s): renal insuff  Other reaction(s): CHF     Current Outpatient Medications on File Prior to Visit   Medication Sig Dispense Refill    amitriptyline (ELAVIL) 25 MG tablet Take 1 tablet (25 mg total) by mouth nightly as needed for Insomnia. 30 tablet 2    apixaban (ELIQUIS) 5 mg Tab Take 1 tablet (5 mg total) by mouth 2 (two) times daily. 180 tablet 1    aspirin (ECOTRIN) 81 MG EC tablet Take 1 tablet by mouth Daily.       BD ULTRA-FINE MINI PEN NEEDLE 31 gauge x 3/16" Ndle Use to inject insulin as needed up to 4 times daily 100 each 5    blood sugar diagnostic (CONTOUR NEXT TEST STRIPS) Strp Test blood sugar 4 (four) times daily before meals and nightly. 100 each 1    blood-glucose meter (FREESTYLE LITE METER) kit 1 each 4 (four) times daily. 1 each 0    calcitRIOL (ROCALTROL) 0.25 MCG Cap Take 1 capsule (0.25 mcg total) by mouth once daily. 30 capsule 11    famotidine (PEPCID) 20 MG tablet TAKE ONE TABLET BY MOUTH EVERY EVENING 30 tablet " 11    ferrous sulfate (FEOSOL) 325 mg (65 mg iron) Tab tablet Take 1 tablet (325 mg total) by mouth daily with breakfast. 30 tablet 11    fish oil-omega-3 fatty acids 300-1,000 mg capsule Take 1 g by mouth once daily.      furosemide (LASIX) 80 MG tablet Take one-half tablet (40 mg) by mouth 2 (two) times daily. 30 tablet 11    insulin aspart U-100 (NOVOLOG FLEXPEN U-100 INSULIN) 100 unit/mL (3 mL) InPn pen Inject 25 Units into the skin 3 (three) times daily with meals. 24 mL 0    insulin glargine U-100, Lantus, (LANTUS SOLOSTAR U-100 INSULIN) 100 unit/mL (3 mL) InPn pen Inject 25 Units into the skin 2 (two) times daily. 15 mL 0    lancets (MICROLET LANCET) Misc 100 lancets by Misc.(Non-Drug; Combo Route) route 4 (four) times daily before meals and nightly. 100 each 11    magnesium oxide (MAG-OX) 400 mg (241.3 mg magnesium) tablet Take 1 tablet (400 mg total) by mouth once daily. 90 tablet 2    metoprolol succinate (TOPROL-XL) 25 MG 24 hr tablet Take 1 tablet (25 mg total) by mouth once daily. 90 tablet 3    multivitamin (THERAGRAN) tablet Take 1 tablet by mouth once daily.      mycophenolate (CELLCEPT) 250 mg Cap Take 2 capsules (500 mg total) by mouth 2 (two) times daily. 120 capsule 11    ondansetron (ZOFRAN) 4 MG tablet Take 1 tablet (4 mg total) by mouth every 6 (six) hours. 30 tablet 0    potassium chloride SA (K-DUR,KLOR-CON) 20 MEQ tablet Take 2 tablets (40 mEq total) by mouth once daily. 180 tablet 1    predniSONE (DELTASONE) 5 MG tablet Take 1 tablet (5 mg total) by mouth once daily. 30 tablet 11    rosuvastatin (CRESTOR) 40 MG Tab Take 1 tablet (40 mg total) by mouth once daily. 90 tablet 3    sacubitriL-valsartan (ENTRESTO)  mg per tablet Take 1 tablet by mouth 2 (two) times daily. 180 tablet 1    semaglutide (OZEMPIC) 2 mg/dose (8 mg/3 mL) PnIj Inject 2 mg into the skin every 7 days. 6 mL 1    tacrolimus (PROGRAF) 1 MG Cap Take 5 capsules (5 mg) by mouth every morning and 4 capsules (4 mg) by  mouth every evening (9 mg per day total). 270 capsule 11    triamcinolone acetonide 0.1% (KENALOG) 0.1 % ointment Apply topically 2 (two) times daily. Use on bilateral lower legs. 454 g 1    [DISCONTINUED] montelukast (SINGULAIR) 10 mg tablet Take 1 tablet (10 mg total) by mouth every evening. 90 tablet 3     Current Facility-Administered Medications on File Prior to Visit   Medication Dose Route Frequency Provider Last Rate Last Admin    acetaminophen tablet 650 mg  650 mg Oral Once PRN Sal Lopez MD         Social History     Socioeconomic History    Marital status:     Number of children: 2   Occupational History    Occupation: retired     Employer: Retired   Tobacco Use    Smoking status: Former     Current packs/day: 0.00     Types: Cigarettes     Quit date: 2013     Years since quittin.2     Passive exposure: Past    Smokeless tobacco: Former     Quit date: 2013    Tobacco comments:     used marijuana since [id number], stopped after started dialysis   Substance and Sexual Activity    Alcohol use: No     Alcohol/week: 0.0 standard drinks of alcohol    Drug use: Not Currently     Comment:      Sexual activity: Never   Social History Narrative    . Lives with spouse. Has 2 children. Patient retired as  for [email or web address]. He has been washing cars.     Social Determinants of Health     Financial Resource Strain: Low Risk  (10/2/2020)    Overall Financial Resource Strain (CARDIA)     Difficulty of Paying Living Expenses: Not hard at all   Transportation Needs: No Transportation Needs (10/2/2020)    PRAPARE - Transportation     Lack of Transportation (Medical): No     Lack of Transportation (Non-Medical): No   Stress: No Stress Concern Present (10/2/2020)    [employer] - Occupational Stress Questionnaire     Feeling of Stress : Not at all     Family History   Problem Relation Name Age of Onset    Diabetes Mother      Hypertension Mother   "    Heart failure Mother      Heart failure Father      Kidney disease Sister          ESRD    Diabetes Sister      Diabetes Maternal Grandmother      Cancer Neg Hx         Review of Systems   Constitutional:  Positive for appetite change and fatigue. Negative for fever.   HENT:  Positive for postnasal drip, rhinorrhea and congestion.    Eyes:  Negative for redness and itching.   Respiratory:  Positive for apnea, cough, sputum production, shortness of breath, dyspnea on extertion, use of rescue inhaler and Paroxysmal Nocturnal Dyspnea.    Cardiovascular:  Negative for chest pain, palpitations and leg swelling.   Genitourinary:  Negative for difficulty urinating and hematuria.   Endocrine:  Negative for cold intolerance and heat intolerance.    Musculoskeletal:  Positive for arthralgias.   Skin:  Negative for rash.   Gastrointestinal:  Negative for nausea and abdominal pain.   Neurological:  Negative for dizziness, syncope, weakness and light-headedness.   Hematological:  Negative for adenopathy. Does not bruise/bleed easily.   Psychiatric/Behavioral:  Positive for sleep disturbance. The patient is not nervous/anxious.        Objective:      /70   Pulse (!) 116   Resp 16   Ht 5' 6" (1.676 m)   Wt 125.4 kg (276 lb 5.6 oz)   SpO2 96%   BMI 44.60 kg/m²   Physical Exam  Vitals and nursing note reviewed.   Constitutional:       Appearance: He is well-developed. He is obese.   HENT:      Head: Normocephalic and atraumatic.      Nose: Nose normal.   Eyes:      Conjunctiva/sclera: Conjunctivae normal.      Pupils: Pupils are equal, round, and reactive to light.   Neck:      Thyroid: No thyromegaly.      Vascular: No JVD.      Trachea: No tracheal deviation.   Cardiovascular:      Rate and Rhythm: Normal rate. Rhythm irregular.      Heart sounds: No murmur heard.  Pulmonary:      Breath sounds: Examination of the right-lower field reveals wheezing. Examination of the left-lower field reveals wheezing. Decreased " "breath sounds and wheezing present. No rhonchi or rales.   Abdominal:      General: Bowel sounds are normal.      Palpations: Abdomen is soft.   Musculoskeletal:         General: No tenderness. Normal range of motion.      Cervical back: Neck supple.      Right lower leg: Edema present.      Left lower leg: Edema present.   Lymphadenopathy:      Cervical: No cervical adenopathy.   Skin:     General: Skin is warm and dry.   Neurological:      Mental Status: He is alert and oriented to person, place, and time.       Personal Diagnostic Review  none pertinent        8/28/2024     1:29 PM   Pulmonary Studies Review   SpO2 96 %   Height 5' 6" (1.676 m)   Weight 125.4 kg (276 lb 5.6 oz)   BMI (Calculated) 44.6   Predicted Distance 250.99   Predicted Distance Meters (Calculated) 387.72 meters       X-Ray Elbow Complete Left  Narrative: EXAM:  XR ELBOW COMPLETE 3 VIEW LEFT    CLINICAL HISTORY:  Left elbow pain    FINDINGS:  No acute bony changes.  Degenerative changes are present involving the elbow joint.  There soft tissue swelling noted along the dorsal aspect of the elbow.  Impression:  No acute bony changes.    Finalized on: 4/2/2024 11:24 AM By:  Ritchie Cummins MD  [facility]# [id number]      2024-04-02 11:26:45.200    [facility]  X-Ray Hand Complete Left  Narrative: EXAM: XR HAND COMPLETE 3 VIEW LEFT    CLINICAL HISTORY: Left hand pain    FINDINGS:  4 views.  There is soft tissue swelling of the third and fourth fingers.  No acute bony changes.  Mild degenerative changes are present.  Impression:  Nonspecific soft tissue swelling of the third and fourth fingers.    Finalized on: 4/2/2024 11:23 AM By:  Ritchie Cummins MD  [facility]# [id number]      2024-04-02 11:25:45.174    [facility]      Office Spirometry Results:         8/28/2024     1:29 PM 8/26/2024     1:05 PM 8/22/2024     3:26 PM 7/31/2024    11:01 AM 7/23/2024    10:14 AM 7/18/2024     8:00 AM 7/18/2024     7:45 AM   Pulmonary Function Tests   SpO2 96 % 99 % 98 %   98 % 100 %   Height 5' 6" " "(1.676 m) 5' 6" (1.676 m) 5' 6" (1.676 m) 5' 6" (1.676 m) 5' 6" (1.676 m)     Weight 125.4 kg (276 lb 5.6 oz) 123.7 kg (272 lb 11.3 oz) 124.9 kg (275 lb 5.7 oz) 123.6 kg (272 lb 7.8 oz) 123.6 kg (272 lb 7.8 oz)     BMI (Calculated) 44.6 44 44.5 44 44           8/28/2024     1:29 PM   Pulmonary Studies Review   SpO2 96 %   Height 5' 6" (1.676 m)   Weight 125.4 kg (276 lb 5.6 oz)   BMI (Calculated) 44.6   Predicted Distance 250.99   Predicted Distance Meters (Calculated) 387.72 meters           No results found for this or any previous visit (from the past 336 hour(s)).    Assessment:            Mucopurulent chronic bronchitis  -     albuterol (PROVENTIL) 2.5 mg /3 mL (0.083 %) nebulizer solution; Take 3 mLs (2.5 mg total) by nebulization every 4 to 6 hours as needed for Wheezing or Shortness of Breath.  Dispense: 360 mL; Refill: 11  -     NEBULIZER KIT (SUPPLIES) FOR HOME USE  -     NEBULIZER FOR HOME USE  -     albuterol (PROVENTIL/VENTOLIN HFA) 90 mcg/actuation inhaler; Inhale 2 puffs into the lungs every 4 (four) hours as needed for Wheezing or Shortness of Breath.  Dispense: 18 g; Refill: 11  -     Complete PFT with bronchodilator; Future; Expected date: 08/28/2024  -     Six Minute Walk Test to qualify for Home Oxygen; Future    Chronic cough  -     Ambulatory referral/consult to Pulmonology  -     Complete PFT with bronchodilator; Future; Expected date: 08/28/2024  -     Six Minute Walk Test to qualify for Home Oxygen; Future    Solitary pulmonary nodule  -     CT Chest Without Contrast; Future; Expected date: 08/28/2024    Allergic cough  -     montelukast (SINGULAIR) 10 mg tablet; Take 1 tablet (10 mg total) by mouth every evening.  Dispense: 90 tablet; Refill: 3    COPD exacerbation  -     predniSONE (DELTASONE) 20 MG tablet; Prednisone 60 mg/ day for 3 days, 40 mg/day for 3 days,20 mg/ day for 3 days, (1/2 tablet )10 mg a day for 3 days.Resume 5 mg prednisone after finishing  Dispense: 20 tablet; Refill: " "0  -     azithromycin (ZITHROMAX Z-ANITRA) 250 MG tablet; 500 mg on day 1 (two tablets) followed by 250 mg once daily on days 2-5  Dispense: 6 tablet; Refill: 0    Exercise hypoxemia  -     Six Minute Walk Test to qualify for Home Oxygen; Future          Outpatient Encounter Medications as of 8/28/2024   Medication Sig Dispense Refill    albuterol (PROVENTIL) 2.5 mg /3 mL (0.083 %) nebulizer solution Take 3 mLs (2.5 mg total) by nebulization every 4 to 6 hours as needed for Wheezing or Shortness of Breath. 360 mL 11    albuterol (PROVENTIL/VENTOLIN HFA) 90 mcg/actuation inhaler Inhale 2 puffs into the lungs every 4 (four) hours as needed for Wheezing or Shortness of Breath. 18 g 11    amitriptyline (ELAVIL) 25 MG tablet Take 1 tablet (25 mg total) by mouth nightly as needed for Insomnia. 30 tablet 2    apixaban (ELIQUIS) 5 mg Tab Take 1 tablet (5 mg total) by mouth 2 (two) times daily. 180 tablet 1    aspirin (ECOTRIN) 81 MG EC tablet Take 1 tablet by mouth Daily.       azithromycin (ZITHROMAX Z-ANITRA) 250 MG tablet 500 mg on day 1 (two tablets) followed by 250 mg once daily on days 2-5 6 tablet 0    BD ULTRA-FINE MINI PEN NEEDLE 31 gauge x 3/16" Ndle Use to inject insulin as needed up to 4 times daily 100 each 5    blood sugar diagnostic (CONTOUR NEXT TEST STRIPS) Strp Test blood sugar 4 (four) times daily before meals and nightly. 100 each 1    blood-glucose meter (FREESTYLE LITE METER) kit 1 each 4 (four) times daily. 1 each 0    calcitRIOL (ROCALTROL) 0.25 MCG Cap Take 1 capsule (0.25 mcg total) by mouth once daily. 30 capsule 11    famotidine (PEPCID) 20 MG tablet TAKE ONE TABLET BY MOUTH EVERY EVENING 30 tablet 11    ferrous sulfate (FEOSOL) 325 mg (65 mg iron) Tab tablet Take 1 tablet (325 mg total) by mouth daily with breakfast. 30 tablet 11    fish oil-omega-3 fatty acids 300-1,000 mg capsule Take 1 g by mouth once daily.      furosemide (LASIX) 80 MG tablet Take one-half tablet (40 mg) by mouth 2 (two) times " daily. 30 tablet 11    insulin aspart U-100 (NOVOLOG FLEXPEN U-100 INSULIN) 100 unit/mL (3 mL) InPn pen Inject 25 Units into the skin 3 (three) times daily with meals. 24 mL 0    insulin glargine U-100, Lantus, (LANTUS SOLOSTAR U-100 INSULIN) 100 unit/mL (3 mL) InPn pen Inject 25 Units into the skin 2 (two) times daily. 15 mL 0    lancets (MICROLET LANCET) Misc 100 lancets by Misc.(Non-Drug; Combo Route) route 4 (four) times daily before meals and nightly. 100 each 11    magnesium oxide (MAG-OX) 400 mg (241.3 mg magnesium) tablet Take 1 tablet (400 mg total) by mouth once daily. 90 tablet 2    metoprolol succinate (TOPROL-XL) 25 MG 24 hr tablet Take 1 tablet (25 mg total) by mouth once daily. 90 tablet 3    montelukast (SINGULAIR) 10 mg tablet Take 1 tablet (10 mg total) by mouth every evening. 90 tablet 3    multivitamin (THERAGRAN) tablet Take 1 tablet by mouth once daily.      mycophenolate (CELLCEPT) 250 mg Cap Take 2 capsules (500 mg total) by mouth 2 (two) times daily. 120 capsule 11    ondansetron (ZOFRAN) 4 MG tablet Take 1 tablet (4 mg total) by mouth every 6 (six) hours. 30 tablet 0    potassium chloride SA (K-DUR,KLOR-CON) 20 MEQ tablet Take 2 tablets (40 mEq total) by mouth once daily. 180 tablet 1    predniSONE (DELTASONE) 20 MG tablet Prednisone 60 mg/ day for 3 days, 40 mg/day for 3 days,20 mg/ day for 3 days, (1/2 tablet )10 mg a day for 3 days.Resume 5 mg prednisone after finishing 20 tablet 0    predniSONE (DELTASONE) 5 MG tablet Take 1 tablet (5 mg total) by mouth once daily. 30 tablet 11    rosuvastatin (CRESTOR) 40 MG Tab Take 1 tablet (40 mg total) by mouth once daily. 90 tablet 3    sacubitriL-valsartan (ENTRESTO)  mg per tablet Take 1 tablet by mouth 2 (two) times daily. 180 tablet 1    semaglutide (OZEMPIC) 2 mg/dose (8 mg/3 mL) PnIj Inject 2 mg into the skin every 7 days. 6 mL 1    tacrolimus (PROGRAF) 1 MG Cap Take 5 capsules (5 mg) by mouth every morning and 4 capsules (4 mg) by  mouth every evening (9 mg per day total). 270 capsule 11    triamcinolone acetonide 0.1% (KENALOG) 0.1 % ointment Apply topically 2 (two) times daily. Use on bilateral lower legs. 454 g 1    [DISCONTINUED] blood sugar diagnostic (CONTOUR NEXT TEST STRIPS) Strp by Misc.(Non-Drug; Combo Route) route 4 (four) times daily before meals and nightly. 100 each 1    [DISCONTINUED] montelukast (SINGULAIR) 10 mg tablet Take 1 tablet (10 mg total) by mouth every evening. 90 tablet 3     Facility-Administered Encounter Medications as of 2024   Medication Dose Route Frequency Provider Last Rate Last Admin    acetaminophen tablet 650 mg  650 mg Oral Once PRN Sal Lopez MD         Plan:       Requested Prescriptions     Signed Prescriptions Disp Refills    montelukast (SINGULAIR) 10 mg tablet 90 tablet 3     Sig: Take 1 tablet (10 mg total) by mouth every evening.    predniSONE (DELTASONE) 20 MG tablet 20 tablet 0     Sig: Prednisone 60 mg/ day for 3 days, 40 mg/day for 3 days,20 mg/ day for 3 days, (1/2 tablet )10 mg a day for 3 days.Resume 5 mg prednisone after finishing    albuterol (PROVENTIL) 2.5 mg /3 mL (0.083 %) nebulizer solution 360 mL 11     Sig: Take 3 mLs (2.5 mg total) by nebulization every 4 to 6 hours as needed for Wheezing or Shortness of Breath.    albuterol (PROVENTIL/VENTOLIN HFA) 90 mcg/actuation inhaler 18 g 11     Sig: Inhale 2 puffs into the lungs every 4 (four) hours as needed for Wheezing or Shortness of Breath.    azithromycin (ZITHROMAX Z-ANITRA) 250 MG tablet 6 tablet 0     Si mg on day 1 (two tablets) followed by 250 mg once daily on days 2-5     Problem List Items Addressed This Visit       Chronic cough    Relevant Orders    Complete PFT with bronchodilator    Six Minute Walk Test to qualify for Home Oxygen     Other Visit Diagnoses       Mucopurulent chronic bronchitis    -  Primary    Relevant Medications    albuterol (PROVENTIL) 2.5 mg /3 mL (0.083 %) nebulizer solution     albuterol (PROVENTIL/VENTOLIN HFA) 90 mcg/actuation inhaler    Other Relevant Orders    NEBULIZER KIT (SUPPLIES) FOR HOME USE    NEBULIZER FOR HOME USE    Complete PFT with bronchodilator    Six Minute Walk Test to qualify for Home Oxygen    Solitary pulmonary nodule        Relevant Orders    CT Chest Without Contrast    Allergic cough        Relevant Medications    montelukast (SINGULAIR) 10 mg tablet    COPD exacerbation        Relevant Medications    predniSONE (DELTASONE) 20 MG tablet    azithromycin (ZITHROMAX Z-ANITRA) 250 MG tablet    Exercise hypoxemia        Relevant Orders    Six Minute Walk Test to qualify for Home Oxygen               Follow up in about 7 months (around 3/28/2025) for CT - on return visit, PFT -return.    MEDICAL DECISION MAKING: Moderate to high complexity.  Overall, the multiple problems listed are of moderate to high severity that may impact quality of life and activities of daily living. Side effects of medications, treatment plan as well as options and alternatives reviewed and discussed with patient. There was counseling of patient concerning these issues.    Total time spent in counseling and coordination of care - 43  minutes of total time spent on the encounter, which includes face to face time and non-face to face time preparing to see the patient (eg, review of tests), Obtaining and/or reviewing separately obtained history, Documenting clinical information in the electronic or other health record, Independently interpreting results (not separately reported) and communicating results to the patient/family/caregiver, or Care coordination (not separately reported).    Time was used in discussion of prognosis, risks, benefits of treatment, instructions and compliance with regimen . Discussion with other physicians and/or health care providers - home health or for use of durable medical equipment (oxygen, nebulizers, CPAP, BiPAP) occurred.

## 2024-08-29 NOTE — PROGRESS NOTES
Patient ID: Mitch Whittaker is a 68 y.o. male.  Patient's current PCP is Valery Caal MD.   Collaborating Physician: GERRY Ramirez MD    Chief Complaint: Diabetes Mellitus    HPI  Mitch Whittaker is a 68 y.o. Black or  male presenting for a new consult for diabetes. HX KIDNEY TRANSPLANT 2015 - on prednisone 5 mg every am      Patient has been diagnosed with type 2 diabetes since 1982 .  Complications related to diabetes:  nephropathy - Dr Gonsalez ([facility])  Retinopathy - Dr Zuniga  peripheral neuropathy  cardiovascular disease - Dr Muhammad (started Ozempic)  peripheral vascular disease  Recent diabetes related hospitalizations: None recently  Previous diabetes education:YES, years ago    Current diet: Eating 2-3 meals per day. Occasionally skipping lunch. No set plan. Needs better understanding of foods that turn to sugar and sources of sodium  Activity level: limited    Current issues:Recent issues with Hypoglycemia. Started on prednisone tapering dose yesterday per Pulmonology for COPD exacerbation:Now with hyperglycemia  predniSONE (DELTASONE) 20 MG tablet; Prednisone 60 mg/ day for 3 days, 40 mg/day for 3 days,20 mg/ day for 3 days, (1/2 tablet )10 mg a day for 3 days.Resume 5 mg prednisone after finishing Dispense: 20 tablet; Refill: 0     Personal history of pancreatitis: denies  Personal history of abdominal surgery: denies  Personal history of thyroid surgery: denies  Family history of pancreatic cancer in first-degree relative: denies  Family history of MTC/MEN/endocrine tumors: denies     Past Medical History:   Diagnosis Date    Acquired renal cyst of left kidney     Anemia associated with chronic renal failure     CAD (coronary artery disease)     nonobstructive [facility] 9/14    CHF (congestive heart failure)     Chronic immunosuppression with Prograf and MMF 06/18/2015    Chronic venous insufficiency of lower extremity     CKD (chronic kidney disease) stage 3, GFR 30-59 ml/min     Cytomegalic  inclusion virus hepatitis 12/10/2022    Diabetic retinopathy     DM (diabetes mellitus), type 2 with complications 1994    Edema     End stage kidney disease     s/p transplant, doing well    Gallbladder polyp     Heart failure, diastolic, due to HTN     Hemodialysis status     off since transplant    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 RNA NEGATIVE 2015, ,     History of colon polyps     HPTH (hyperparathyroidism)     Hyperlipidemia     Hypertension associated with stage 3 chronic kidney disease due to type 2 diabetes mellitus     LBBB (left bundle branch block) 2021    Morbid obesity with BMI of 45.0-49.9, adult     Nephrolithiasis 2013    PCO (posterior capsular opacification), left 2019    Proteinuria     resolved s/p transplant    S/P kidney transplant     Sleep apnea     Type 2 diabetes, uncontrolled, with retinopathy     Type II diabetes mellitus with renal manifestations      Social History     Socioeconomic History    Marital status:     Number of children: 2   Occupational History    Occupation: retired     Employer: Retired   Tobacco Use    Smoking status: Former     Current packs/day: 0.00     Types: Cigarettes     Quit date: 2013     Years since quittin.2     Passive exposure: Past    Smokeless tobacco: Former     Quit date: 2013    Tobacco comments:     used marijuana since [id number], stopped after started dialysis   Substance and Sexual Activity    Alcohol use: No     Alcohol/week: 0.0 standard drinks of alcohol    Drug use: Not Currently     Comment:      Sexual activity: Never   Social History Narrative    . Lives with spouse. Has 2 children. Patient retired as  for [employer]. He has been washing cars.     Social Determinants of Health     Financial Resource Strain: Low Risk  (10/2/2020)    Overall Financial Resource Strain (CARDIA)     Difficulty of Paying Living Expenses: Not hard at all    Transportation Needs: No Transportation Needs (10/2/2020)    PRAPARE - Transportation     Lack of Transportation (Medical): No     Lack of Transportation (Non-Medical): No   Stress: No Stress Concern Present (10/2/2020)    [employer] - Occupational Stress Questionnaire     Feeling of Stress : Not at all       Review of patient's allergies indicates:   Allergen Reactions    Lisinopril Other (See Comments)     Other reaction(s):  cough    Actos  [pioglitazone] Other (See Comments)     Other reaction(s): CHF    Metformin Other (See Comments)     Other reaction(s): renal insuff  Other reaction(s): CHF       CURRENT DM MEDICATIONS:   Diabetes Medications               insulin aspart U-100 (NOVOLOG FLEXPEN U-100 INSULIN) 100 unit/mL (3 mL) InPn pen Inject 25 Units into the skin 3 (three) times daily with meals.    insulin glargine U-100, Lantus, (LANTUS SOLOSTAR U-100 INSULIN) 100 unit/mL (3 mL) InPn pen Inject 25 Units into the skin 2 (two) times daily.  Recently lowered due to hypoglycemia    semaglutide (OZEMPIC) 2 mg/dose (8 mg/3 mL) PnIj Inject 2 mg into the skin every 7 days.            Past failed treatment(s) include:   Actos - CHF  Metformin - CRI  Glimepiride - hypoglycemia    Meter/cgm: meter  Blood glucose testing is performed regularly.   Patient is testing 1-3 times per day.  Any episodes of hypoglycemia? Yes, am upon waking. Holding insulin when low in ams  Glucose trends:   Not with him for review    His blood sugar in the clinic today was:   Lab Results   Component Value Date    POCGLU 500 (A) 08/30/2024   Started Prednisone 60 mg yesterday. States took Novolog 25 units and Lantus 25 units prior to appointment today.    Statin: Taking  ACE/ARB: Not taking    Labs reviewed and are noted below.    Screening or Prevention Patient's value Goal Complete/Controlled?   HgA1C Testing and Control   Lab Results   Component Value Date    HGBA1C 7.4 (H) 06/18/2024      Annually/Less  "than 8% Yes   Lipid profile : 12/20/2023 Annually Yes   LDL control Lab Results   Component Value Date    LDLCALC 128.4 12/20/2023    Annually/Less than 100 mg/dl  No   Nephropathy screening Lab Results   Component Value Date    MICALBCREAT Unable to calculate 06/21/2023     Lab Results   Component Value Date    PROTEINUA Negative 03/18/2024    Annually Yes   Blood pressure BP Readings from Last 1 Encounters:   08/30/24 122/66    Less than 140/90 Yes   Dilated retinal exam : 06/28/2024 Annually Yes    Foot exam   : 10/10/2022 Annually No     Glucose   Date Value Ref Range Status   06/18/2024 275 (H) 70 - 110 mg/dL Final     Anion Gap   Date Value Ref Range Status   06/18/2024 12 8 - 16 mmol/L Final     eGFR if    Date Value Ref Range Status   07/06/2022 22.7 (A) >60 mL/min/1.73 m^2 Final     eGFR if non    Date Value Ref Range Status   07/06/2022 19.6 (A) >60 mL/min/1.73 m^2 Final     Comment:     Calculation used to obtain the estimated glomerular filtration  rate (eGFR) is the CKD-EPI equation.        Lab Results   Component Value Date    FREET4 0.97 07/08/2013    TSH 0.999 03/11/2022     No results found for: "CPEPTIDE"  No results found for: "GLUTAMICACID"    Wt Readings from Last 3 Encounters:   08/30/24 1506 125.8 kg (277 lb 5.4 oz)   08/28/24 1329 125.4 kg (276 lb 5.6 oz)   08/26/24 1305 123.7 kg (272 lb 11.3 oz)       Review of Systems   Constitutional:  Positive for malaise/fatigue. Negative for weight loss.   Eyes:  Negative for blurred vision and double vision.   Respiratory:  Positive for shortness of breath and wheezing.    Cardiovascular:  Positive for leg swelling.   Gastrointestinal: Negative.    Genitourinary:  Positive for frequency.   Musculoskeletal:  Negative for myalgias.   Neurological: Negative.    Endo/Heme/Allergies:  Positive for polydipsia.   Psychiatric/Behavioral: Negative.         Physical Exam  Vitals reviewed.   Constitutional:       General: He is not " in acute distress.     Appearance: Normal appearance. He is obese.   Eyes:      Conjunctiva/sclera: Conjunctivae normal.      Pupils: Pupils are equal, round, and reactive to light.   Cardiovascular:      Rate and Rhythm: Normal rate and regular rhythm.      Pulses: Normal pulses.      Heart sounds: Normal heart sounds.   Pulmonary:      Breath sounds: Wheezing (scattered bilaterally) present. No rales.      Comments: + cough  Abdominal:      General: There is no distension.      Palpations: Abdomen is soft.   Musculoskeletal:      Right lower leg: Edema present.      Left lower leg: Edema present.   Skin:     General: Skin is warm and dry.      Comments: Chronic venous stasis changes noted to LE bilaterally. No signs of acute infection   Neurological:      General: No focal deficit present.      Mental Status: He is alert and oriented to person, place, and time.   Psychiatric:         Mood and Affect: Mood normal.           Assessment & Plan    Mitch was seen today for diabetes mellitus.    Diagnoses and all orders for this visit:    Type 2 diabetes mellitus with diabetic nephropathy, unspecified whether long term insulin use  -     blood-glucose sensor (DEXCOM G7 SENSOR) [place]; 1 each by Misc.(Non-Drug; Combo Route) route every 10 days.  -     Ambulatory referral/consult to Diabetes Education; Future    Uncontrolled type 2 diabetes mellitus with hypoglycemia without coma  -     Ambulatory referral/consult to Diabetic Advanced Practice Providers (Medical Management)  -     POCT Glucose, Hand-Held Device  -     blood-glucose sensor (DEXCOM G7 SENSOR) [place]; 1 each by Misc.(Non-Drug; Combo Route) route every 10 days.    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, with long-term current use of insulin    Diabetic sensorimotor polyneuropathy    Living-related donor kidney transplant (daughter) - 6/12/15  -     Ambulatory referral/consult to Diabetes Education; Future    Hypertension  associated with stage 3 chronic kidney disease due to type 2 diabetes mellitus    Chronic combined systolic and diastolic congestive heart failure  -     Ambulatory referral/consult to Diabetes Education; Future    Coronary artery disease of native artery of native heart with stable angina pectoris    Morbid obesity with BMI of 40.0-44.9, adult    Chronic venous insufficiency of lower extremity    Plan:  Stabilize blood sugar while on prednisone, then readjust for normal daily routine once steroid taper completed  While on prednisone:  Increase Lantus to 30 units twice a day  Increase Novolog to 30 units with each meal plus sliding scale:    Sliding scale for Novolog rapid acting insulin to use before meals    If blood sugar is          <60    =  subtract 2 units   60-80    =  subtract 1 unit     =  No change  141-180 =  add 2 unit  181-220 = add 4 units  221-260 = add 6 units  261-300 = add 8 units  301-340 = add 10 units  > 340     = add 12 units    If NOT eating lunch - Follow sliding scale for the Novolog     Download Dexcom apps: Start Dexcom G7 cgm to assist with treatment decisions and prevent hypoglycemia    Referral to RD placed for assistance with  Carb controlled Renal diet  Carbohydrates: 75 grams per meal or 5 servings per meal for now    Mychart with blood sugars on Tuesday  Refer to [facility]     Reviewed with patient and wife when to go to ED for worrisome symptoms.     - Follow up:  4 weeks    Visit today included increased complexity associated with the care of the episodic problem fluctuating glucoses addressed and managing the longitudinal care of the patient due to the serious and/or complex managed problem(s) T2dm.

## 2024-08-30 ENCOUNTER — OFFICE VISIT (OUTPATIENT)
Dept: DIABETES | Facility: CLINIC | Age: 69
End: 2024-08-30
Payer: COMMERCIAL

## 2024-08-30 VITALS
WEIGHT: 277.31 LBS | HEART RATE: 112 BPM | BODY MASS INDEX: 44.76 KG/M2 | SYSTOLIC BLOOD PRESSURE: 122 MMHG | DIASTOLIC BLOOD PRESSURE: 66 MMHG

## 2024-08-30 DIAGNOSIS — E11.649 UNCONTROLLED TYPE 2 DIABETES MELLITUS WITH HYPOGLYCEMIA WITHOUT COMA: ICD-10-CM

## 2024-08-30 DIAGNOSIS — E11.3553 TYPE 2 DIABETES MELLITUS WITH STABLE PROLIFERATIVE RETINOPATHY OF BOTH EYES, WITH LONG-TERM CURRENT USE OF INSULIN: ICD-10-CM

## 2024-08-30 DIAGNOSIS — I25.118 CORONARY ARTERY DISEASE OF NATIVE ARTERY OF NATIVE HEART WITH STABLE ANGINA PECTORIS: ICD-10-CM

## 2024-08-30 DIAGNOSIS — E66.01 MORBID OBESITY WITH BMI OF 40.0-44.9, ADULT: ICD-10-CM

## 2024-08-30 DIAGNOSIS — N18.30 HYPERTENSION ASSOCIATED WITH STAGE 3 CHRONIC KIDNEY DISEASE DUE TO TYPE 2 DIABETES MELLITUS: ICD-10-CM

## 2024-08-30 DIAGNOSIS — I87.2 CHRONIC VENOUS INSUFFICIENCY OF LOWER EXTREMITY: ICD-10-CM

## 2024-08-30 DIAGNOSIS — E11.22 HYPERTENSION ASSOCIATED WITH STAGE 3 CHRONIC KIDNEY DISEASE DUE TO TYPE 2 DIABETES MELLITUS: ICD-10-CM

## 2024-08-30 DIAGNOSIS — I12.9 HYPERTENSION ASSOCIATED WITH STAGE 3 CHRONIC KIDNEY DISEASE DUE TO TYPE 2 DIABETES MELLITUS: ICD-10-CM

## 2024-08-30 DIAGNOSIS — I50.42 CHRONIC COMBINED SYSTOLIC AND DIASTOLIC CONGESTIVE HEART FAILURE: ICD-10-CM

## 2024-08-30 DIAGNOSIS — Z94.0 KIDNEY TRANSPLANT STATUS, LIVING RELATED DONOR: Chronic | ICD-10-CM

## 2024-08-30 DIAGNOSIS — E11.42 DIABETIC SENSORIMOTOR POLYNEUROPATHY: ICD-10-CM

## 2024-08-30 DIAGNOSIS — Z79.4 TYPE 2 DIABETES MELLITUS WITH STABLE PROLIFERATIVE RETINOPATHY OF BOTH EYES, WITH LONG-TERM CURRENT USE OF INSULIN: ICD-10-CM

## 2024-08-30 DIAGNOSIS — E11.21 TYPE 2 DIABETES MELLITUS WITH DIABETIC NEPHROPATHY, UNSPECIFIED WHETHER LONG TERM INSULIN USE: Primary | ICD-10-CM

## 2024-08-30 LAB — GLUCOSE SERPL-MCNC: 500 MG/DL (ref 70–110)

## 2024-08-30 PROCEDURE — 99999 PR PBB SHADOW E&M-EST. PATIENT-LVL V: CPT | Mod: PBBFAC,,, | Performed by: NURSE PRACTITIONER

## 2024-08-30 RX ORDER — BLOOD-GLUCOSE SENSOR
1 EACH MISCELLANEOUS
Qty: 3 EACH | Refills: 5 | Status: SHIPPED | OUTPATIENT
Start: 2024-08-30 | End: 2025-08-30

## 2024-08-30 NOTE — PATIENT INSTRUCTIONS
While on prednisone:  Increase Lantus to 30 units twice a day  Increase Novolog to 30 units with each meal plus sliding scale:    Sliding scale for Novolog rapid acting insulin to use before meals    If blood sugar is          <60    =  subtract 2 units   60-80    =  subtract 1 unit     =  No change  141-180 =  add 2 unit  181-220 = add 4 units  221-260 = add 6 units  261-300 = add 8 units  301-340 = add 10 units  > 340     = add 12 units    If NOT eating lunch - Follow sliding scale for the Novolog     Mychart with blood sugars on Tuesday    Download apps:   Dexcom G7   Dexcom Clarity    Carbohydrates: 75 grams per meal or 5 servings per meal

## 2024-09-06 DIAGNOSIS — I87.2 VENOUS STASIS DERMATITIS, UNSPECIFIED LATERALITY: ICD-10-CM

## 2024-09-06 RX ORDER — TRIAMCINOLONE ACETONIDE 1 MG/G
OINTMENT TOPICAL 2 TIMES DAILY
Qty: 454 G | Refills: 1 | OUTPATIENT
Start: 2024-09-06

## 2024-09-09 RX ORDER — KETOCONAZOLE 200 MG/1
100 TABLET ORAL DAILY
Qty: 15 TABLET | Refills: 9 | OUTPATIENT
Start: 2024-09-09

## 2024-09-17 ENCOUNTER — LAB VISIT (OUTPATIENT)
Dept: LAB | Facility: HOSPITAL | Age: 69
End: 2024-09-17
Attending: INTERNAL MEDICINE
Payer: COMMERCIAL

## 2024-09-17 ENCOUNTER — PATIENT OUTREACH (OUTPATIENT)
Dept: ADMINISTRATIVE | Facility: HOSPITAL | Age: 69
End: 2024-09-17
Payer: COMMERCIAL

## 2024-09-17 DIAGNOSIS — E11.9 TYPE 2 DIABETES MELLITUS WITHOUT COMPLICATION: ICD-10-CM

## 2024-09-17 DIAGNOSIS — Z94.0 KIDNEY TRANSPLANTED: ICD-10-CM

## 2024-09-17 LAB
ALBUMIN SERPL BCP-MCNC: 3.2 G/DL (ref 3.5–5.2)
ALBUMIN/CREAT UR: 10 UG/MG (ref 0–30)
ANION GAP SERPL CALC-SCNC: 11 MMOL/L (ref 8–16)
ANISOCYTOSIS BLD QL SMEAR: SLIGHT
BASOPHILS # BLD AUTO: 0.02 K/UL (ref 0–0.2)
BASOPHILS NFR BLD: 0.7 % (ref 0–1.9)
BUN SERPL-MCNC: 34 MG/DL (ref 8–23)
CALCIUM SERPL-MCNC: 9.6 MG/DL (ref 8.7–10.5)
CHLORIDE SERPL-SCNC: 107 MMOL/L (ref 95–110)
CO2 SERPL-SCNC: 26 MMOL/L (ref 23–29)
CREAT SERPL-MCNC: 2.2 MG/DL (ref 0.5–1.4)
CREAT UR-MCNC: 100 MG/DL (ref 23–375)
DIFFERENTIAL METHOD BLD: ABNORMAL
EOSINOPHIL # BLD AUTO: 0 K/UL (ref 0–0.5)
EOSINOPHIL NFR BLD: 0.3 % (ref 0–8)
ERYTHROCYTE [DISTWIDTH] IN BLOOD BY AUTOMATED COUNT: 14.8 % (ref 11.5–14.5)
EST. GFR  (NO RACE VARIABLE): 31.4 ML/MIN/1.73 M^2
GLUCOSE SERPL-MCNC: 208 MG/DL (ref 70–110)
HCT VFR BLD AUTO: 39.2 % (ref 40–54)
HGB BLD-MCNC: 11 G/DL (ref 14–18)
IMM GRANULOCYTES # BLD AUTO: 0.1 K/UL (ref 0–0.04)
IMM GRANULOCYTES NFR BLD AUTO: 3.4 % (ref 0–0.5)
LYMPHOCYTES # BLD AUTO: 0.6 K/UL (ref 1–4.8)
LYMPHOCYTES NFR BLD: 21 % (ref 18–48)
MCH RBC QN AUTO: 25.9 PG (ref 27–31)
MCHC RBC AUTO-ENTMCNC: 28.1 G/DL (ref 32–36)
MCV RBC AUTO: 92 FL (ref 82–98)
MICROALBUMIN UR DL<=1MG/L-MCNC: 10 UG/ML
MONOCYTES # BLD AUTO: 0.5 K/UL (ref 0.3–1)
MONOCYTES NFR BLD: 16.6 % (ref 4–15)
NEUTROPHILS # BLD AUTO: 1.7 K/UL (ref 1.8–7.7)
NEUTROPHILS NFR BLD: 58 % (ref 38–73)
NRBC BLD-RTO: 0 /100 WBC
OVALOCYTES BLD QL SMEAR: ABNORMAL
PHOSPHATE SERPL-MCNC: 3.3 MG/DL (ref 2.7–4.5)
PLATELET # BLD AUTO: 123 K/UL (ref 150–450)
PLATELET BLD QL SMEAR: ABNORMAL
PMV BLD AUTO: 11.8 FL (ref 9.2–12.9)
POIKILOCYTOSIS BLD QL SMEAR: SLIGHT
POTASSIUM SERPL-SCNC: 4.1 MMOL/L (ref 3.5–5.1)
RBC # BLD AUTO: 4.25 M/UL (ref 4.6–6.2)
SODIUM SERPL-SCNC: 144 MMOL/L (ref 136–145)
WBC # BLD AUTO: 2.9 K/UL (ref 3.9–12.7)

## 2024-09-17 PROCEDURE — 82570 ASSAY OF URINE CREATININE: CPT | Performed by: FAMILY MEDICINE

## 2024-09-17 PROCEDURE — 36415 COLL VENOUS BLD VENIPUNCTURE: CPT | Performed by: INTERNAL MEDICINE

## 2024-09-17 PROCEDURE — 80197 ASSAY OF TACROLIMUS: CPT | Performed by: INTERNAL MEDICINE

## 2024-09-17 PROCEDURE — 80069 RENAL FUNCTION PANEL: CPT | Performed by: INTERNAL MEDICINE

## 2024-09-17 PROCEDURE — 85025 COMPLETE CBC W/AUTO DIFF WBC: CPT | Performed by: INTERNAL MEDICINE

## 2024-09-18 LAB — TACROLIMUS BLD-MCNC: 3.8 NG/ML (ref 5–15)

## 2024-09-19 DIAGNOSIS — E11.649 UNCONTROLLED TYPE 2 DIABETES MELLITUS WITH HYPOGLYCEMIA WITHOUT COMA: ICD-10-CM

## 2024-09-19 NOTE — TELEPHONE ENCOUNTER
Care Due:                  Date            Visit Type   Department     Provider  --------------------------------------------------------------------------------                                NP -                              PRIMARY      [facility] INTERNAL  Last Visit: 08-      CARE ([facility])   MARCIO Caal                              EP -                              PRIMARY      [facility] INTERNAL  Next Visit: 11-      CARE ([facility])   MARCIO Caal                                                            Last  Test          Frequency    Reason                     Performed    Due Date  --------------------------------------------------------------------------------    HBA1C.......  6 months...  insulin..................  06- 12-    Mg Level....  12 months..  magnesium................  Not Found    Overdue    [employer] Embedded Care Due Messages. Reference number: [id number].   9/19/2024 5:09:09 AM CDT

## 2024-09-20 RX ORDER — INSULIN ASPART 100 [IU]/ML
25 INJECTION, SOLUTION INTRAVENOUS; SUBCUTANEOUS
Qty: 30 ML | Refills: 0 | Status: SHIPPED | OUTPATIENT
Start: 2024-09-20

## 2024-09-24 ENCOUNTER — OFFICE VISIT (OUTPATIENT)
Dept: NEPHROLOGY | Facility: CLINIC | Age: 69
End: 2024-09-24
Payer: COMMERCIAL

## 2024-09-24 VITALS
RESPIRATION RATE: 18 BRPM | SYSTOLIC BLOOD PRESSURE: 90 MMHG | WEIGHT: 275.56 LBS | DIASTOLIC BLOOD PRESSURE: 52 MMHG | HEIGHT: 66 IN | BODY MASS INDEX: 44.29 KG/M2 | HEART RATE: 109 BPM

## 2024-09-24 DIAGNOSIS — Z94.0 KIDNEY REPLACED BY TRANSPLANT: ICD-10-CM

## 2024-09-24 PROCEDURE — 3066F NEPHROPATHY DOC TX: CPT | Mod: CPTII,S$GLB,, | Performed by: INTERNAL MEDICINE

## 2024-09-24 PROCEDURE — 3061F NEG MICROALBUMINURIA REV: CPT | Mod: CPTII,S$GLB,, | Performed by: INTERNAL MEDICINE

## 2024-09-24 PROCEDURE — 1159F MED LIST DOCD IN RCRD: CPT | Mod: CPTII,S$GLB,, | Performed by: INTERNAL MEDICINE

## 2024-09-24 PROCEDURE — 99999 PR PBB SHADOW E&M-EST. PATIENT-LVL III: CPT | Mod: PBBFAC,,, | Performed by: INTERNAL MEDICINE

## 2024-09-24 PROCEDURE — 99215 OFFICE O/P EST HI 40 MIN: CPT | Mod: S$GLB,,, | Performed by: INTERNAL MEDICINE

## 2024-09-24 PROCEDURE — 3074F SYST BP LT 130 MM HG: CPT | Mod: CPTII,S$GLB,, | Performed by: INTERNAL MEDICINE

## 2024-09-24 PROCEDURE — 3008F BODY MASS INDEX DOCD: CPT | Mod: CPTII,S$GLB,, | Performed by: INTERNAL MEDICINE

## 2024-09-24 PROCEDURE — 1101F PT FALLS ASSESS-DOCD LE1/YR: CPT | Mod: CPTII,S$GLB,, | Performed by: INTERNAL MEDICINE

## 2024-09-24 PROCEDURE — 1126F AMNT PAIN NOTED NONE PRSNT: CPT | Mod: CPTII,S$GLB,, | Performed by: INTERNAL MEDICINE

## 2024-09-24 PROCEDURE — 3051F HG A1C>EQUAL 7.0%<8.0%: CPT | Mod: CPTII,S$GLB,, | Performed by: INTERNAL MEDICINE

## 2024-09-24 PROCEDURE — 4010F ACE/ARB THERAPY RXD/TAKEN: CPT | Mod: CPTII,S$GLB,, | Performed by: INTERNAL MEDICINE

## 2024-09-24 PROCEDURE — 3078F DIAST BP <80 MM HG: CPT | Mod: CPTII,S$GLB,, | Performed by: INTERNAL MEDICINE

## 2024-09-24 PROCEDURE — 3288F FALL RISK ASSESSMENT DOCD: CPT | Mod: CPTII,S$GLB,, | Performed by: INTERNAL MEDICINE

## 2024-09-24 RX ORDER — KETOCONAZOLE 200 MG/1
100 TABLET ORAL DAILY
Qty: 45 TABLET | Refills: 3 | Status: SHIPPED | OUTPATIENT
Start: 2024-09-24

## 2024-09-24 RX ORDER — TACROLIMUS 1 MG/1
5 CAPSULE ORAL EVERY 12 HOURS
Qty: 300 CAPSULE | Refills: 11 | Status: ACTIVE | OUTPATIENT
Start: 2024-09-24 | End: 2025-09-24

## 2024-09-24 NOTE — PROGRESS NOTES
"Renal clinic f/u note:  Date of clinic visit: 9/24/24  Reason for f/u and chief c/o: h/o of kidney transplant. CHF, CKD.      HPI: Pt is a 69 y/o male with h/o of living related kidney transplant from his daughter in 2015 who presents for f/u. 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 He was last seen in renal clinic in June 2024. Chart was reviewed. Transplant clinic notes were reviewed. Labs, meds, immunosuppressive meds and doses reviewed with pt. Pt reports that ketoconazole has been inadvertently stopped. It is being used in conjunction with immunosuppression therapy to keep the prograf level higher, thereby needing a lowe dose.      On f/u today, noted his BP is low and HR high. Pt feels dizzy sometimes. He is taking lasix 40 m po bid. Pt has been keeping fluid intake low, he has no SOB and says his leg swelling is "much better."  Pt denies having any ongoing or persistent GI issues, no abdominal pain.         To review, pt was admitted to [facility] for ANGELITO (Cr 4.3) and diarrhea in Dec 2022. The cause for the latter was not clear. He did not have colonoscopy. CMV titer by PCR was positive and he was presumably treated for CMV colitis, inially with ganciclovir, then valcyte x 21 days on discharge. Diarrhea resolved right away after starting the anti-viral meds. Pt was d/koki on valcyte and took it x 21 days. CMV titer turned positive again in April 2023. Valcyte was re-started with renal dose adjustment. Pt has been taking it. Pt is now s/p colonoscopy on 6/7/23, which found no sign of viral induced inflammation. Valcyte was stopped in July 2023.     Pt also has a h/o of CHF. Pt denies SOB, has leg swelling. Noted lasix was recently increased by cardiology from 40 mg bid to 80 mg am and 40 mg pm. Noted pt has mild hypotension, denies lightheadedness.         PAST MEDICAL HISTORY: living related kidney transplant form daughter 6/15/2015 (on HD pre-transplant x " 2.5 years), CKD stage 3 to 4, DM-2, HTN, CAD (coronary artery disease), combined diastolic and systolic (EF 40%) CHF, Chronic venous insufficiency of lower extremity, Diabetic retinopathy, Gallbladder polyp, 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, History of colon polyps, HPTH (hyperparathyroidism), Hyperlipidemia, LBBB (left bundle branch block) (12/20/2021), Morbid obesity with BMI of 45.0-49.9, adult, PCO (posterior capsular opacification), left (3/4/2019), Sleep apnea,     PAST SURGICAL HISTORY:  He  has a past surgical history that includes Retinal laser procedure; Cardiac catheterization (2008); Kidney transplant; and Colonoscopy (N/A, 4/5/2018).     SOCIAL HISTORY:  He  reports that he quit smoking about 8 years ago. He quit smokeless tobacco use about 8 years ago. He reports that he does not drink alcohol and does not use drugs.     FAMILY MEDICAL HISTORY:  His family history includes Diabetes in his maternal grandmother, mother, and sister; Heart failure in his mother; Hypertension in his mother; Kidney disease in his sister.               Review of patient's allergies indicates:   Allergen Reactions    Lisinopril Other (See Comments)       Other reaction(s):  cough    Actos  [pioglitazone]         Other reaction(s): chf    Metformin         Other reaction(s): renal insuff  Other reaction(s): chf      Meds reviewed     Current Outpatient Medications:     albuterol (PROVENTIL) 2.5 mg /3 mL (0.083 %) nebulizer solution, Take 3 mLs (2.5 mg total) by nebulization every 4 to 6 hours as needed for Wheezing or Shortness of Breath., Disp: 360 mL, Rfl: 11    albuterol (PROVENTIL/VENTOLIN HFA) 90 mcg/actuation inhaler, Inhale 2 puffs into the lungs every 4 (four) hours as needed for Wheezing or Shortness of Breath., Disp: 8.5 g, Rfl: 11    amitriptyline (ELAVIL) 25 MG tablet, Take 1 tablet (25 mg total) by mouth nightly as needed for Insomnia., Disp: 30 tablet, Rfl: 2    apixaban (ELIQUIS) 5 mg Tab, Take 1 tablet (5  "mg total) by mouth 2 (two) times daily., Disp: 180 tablet, Rfl: 1    aspirin (ECOTRIN) 81 MG EC tablet, Take 1 tablet by mouth Daily. , Disp: , Rfl:     BD ULTRA-FINE MINI PEN NEEDLE 31 gauge x 3/16" Ndle, Use to inject insulin as needed up to 4 times daily, Disp: 100 each, Rfl: 5    blood sugar diagnostic (CONTOUR NEXT TEST STRIPS) Strp, Test blood sugar 4 (four) times daily before meals and nightly., Disp: 100 each, Rfl: 1    blood-glucose sensor (DEXCOM G7 SENSOR) [place], Use as directed for continuous glucose monitoring; Change every 10 days., Disp: 3 each, Rfl: 5    calcitRIOL (ROCALTROL) 0.25 MCG Cap, Take 1 capsule (0.25 mcg total) by mouth once daily., Disp: 30 capsule, Rfl: 11    famotidine (PEPCID) 20 MG tablet, TAKE ONE TABLET BY MOUTH EVERY EVENING, Disp: 30 tablet, Rfl: 11    ferrous sulfate (FEOSOL) 325 mg (65 mg iron) Tab tablet, Take 1 tablet (325 mg total) by mouth daily with breakfast., Disp: 30 tablet, Rfl: 11    fish oil-omega-3 fatty acids 300-1,000 mg capsule, Take 1 g by mouth once daily., Disp: , Rfl:     furosemide (LASIX) 80 MG tablet, Take one-half tablet (40 mg) by mouth 2 (two) times daily., Disp: 30 tablet, Rfl: 11    insulin glargine U-100, Lantus, (LANTUS SOLOSTAR U-100 INSULIN) 100 unit/mL (3 mL) InPn pen, Inject 25 Units into the skin 2 (two) times daily., Disp: 15 mL, Rfl: 0    lancets (MICROLET LANCET) Misc, 100 lancets by Misc.(Non-Drug; Combo Route) route 4 (four) times daily before meals and nightly., Disp: 100 each, Rfl: 11    magnesium oxide (MAG-OX) 400 mg (241.3 mg magnesium) tablet, Take 1 tablet (400 mg total) by mouth once daily., Disp: 90 tablet, Rfl: 2    metoprolol succinate (TOPROL-XL) 25 MG 24 hr tablet, Take 1 tablet (25 mg total) by mouth once daily., Disp: 90 tablet, Rfl: 3    montelukast (SINGULAIR) 10 mg tablet, Take 1 tablet (10 mg total) by mouth every evening., Disp: 90 tablet, Rfl: 3    multivitamin (THERAGRAN) tablet, Take 1 tablet by mouth once daily., " Disp: , Rfl:     mycophenolate (CELLCEPT) 250 mg Cap, Take 2 capsules (500 mg total) by mouth 2 (two) times daily., Disp: 120 capsule, Rfl: 11    NOVOLOG FLEXPEN U-100 INSULIN 100 unit/mL (3 mL) InPn pen, Inject 25 Units into the skin 3 (three) times daily with meals., Disp: 30 mL, Rfl: 0    ondansetron (ZOFRAN) 4 MG tablet, Take 1 tablet (4 mg total) by mouth every 6 (six) hours., Disp: 30 tablet, Rfl: 0    potassium chloride SA (K-DUR,KLOR-CON) 20 MEQ tablet, Take 2 tablets (40 mEq total) by mouth once daily., Disp: 180 tablet, Rfl: 1    predniSONE (DELTASONE) 5 MG tablet, Take 1 tablet (5 mg total) by mouth once daily., Disp: 30 tablet, Rfl: 11    rosuvastatin (CRESTOR) 40 MG Tab, Take 1 tablet (40 mg total) by mouth once daily., Disp: 90 tablet, Rfl: 3    sacubitriL-valsartan (ENTRESTO)  mg per tablet, Take 1 tablet by mouth 2 (two) times daily., Disp: 180 tablet, Rfl: 1    semaglutide (OZEMPIC) 2 mg/dose (8 mg/3 mL) PnIj, Inject 2 mg into the skin every 7 days., Disp: 6 mL, Rfl: 1    triamcinolone acetonide 0.1% (KENALOG) 0.1 % ointment, Apply topically 2 (two) times daily. Use on bilateral lower legs., Disp: 454 g, Rfl: 1    blood-glucose meter (FREESTYLE LITE METER) kit, 1 each 4 (four) times daily., Disp: 1 each, Rfl: 0    ketoconazole (NIZORAL) 200 mg Tab, Take ½ tablet (100 mg total) by mouth once daily., Disp: 45 tablet, Rfl: 3    tacrolimus (PROGRAF) 1 MG Cap, Take 5 capsules (5 mg total) by mouth every 12 (twelve) hours., Disp: 300 capsule, Rfl: 11        REVIEW OF SYSTEMS:  Patient has no fever, fatigue, visual changes, chest pain, edema, cough, dyspnea, nausea, vomiting, constipation, diarrhea, arthralgias, pruritis, dizziness, weakness, depression, confusion.     PHYSICAL EXAM:  Blood pressure 90/52, pulse 109, resp. rate 20, weight 275 lbs, from 275 lbs, from 261 lbs, from 279 lbs, from 271 lbs, from 287 lbs  Gen:    WDWN male in no apparent distress  Psych: Normal mood and affect  Skin:    No  rashes or ulcers  Neck:   No JVD  Chest:  Clear with no rales, rhonchi, wheezing with normal effort  CV:      sinus tachycardia  Abd:     Soft, nontender, no distension  Ext:      trace  edema (had 3+ last visit)        Labs reviewed  BMP  Lab Results   Component Value Date     09/17/2024    K 4.1 09/17/2024     09/17/2024    CO2 26 09/17/2024    BUN 34 (H) 09/17/2024    CREATININE 2.2 (H) 09/17/2024    CALCIUM 9.6 09/17/2024    ANIONGAP 11 09/17/2024    EGFRNORACEVR 31.4 (A) 09/17/2024     Lab Results   Component Value Date    WBC 2.90 (L) 09/17/2024    HGB 11.0 (L) 09/17/2024    HCT 39.2 (L) 09/17/2024    MCV 92 09/17/2024     (L) 09/17/2024       Lab Results   Component Value Date    .9 (H) 11/06/2023    CALCIUM 9.6 09/17/2024    PHOS 3.3 09/17/2024              Prograf level pending           Cardiac study: 1/18/22 reviewed:  Conclusion  Planar imaging demonstrates cardiac activity that is absent to that of the adjacent rib cage.  Study is negative for TTR amyloidosis with an uptake ratio of 1.03.  Perugini Score of 0     Echo 7/27/21 reviewed:  The left ventricle is normal in size with concentric hypertrophy and mildly decreased systolic function.  The estimated ejection fraction is 40%.  Normal right ventricular size with normal right ventricular systolic function.  Indeterminate left ventricular diastolic function.  Moderate left atrial enlargement.           IMPRESSION AND RECOMMENDATIONS:  69 y/o male with h/o of kidney transplant present for f/u     1. Renal: s Cr stable, within baseline range  Stable renal function. CKD stage 3 at baseline     Cr tends to fluctuate, but overall slowly worsening over several years  Reason for s Cr fluctuations is the use of diuretics and h/o of CHF. Pre-renal azotemia  OK to continue diuretics (despite changes in Cr) as pt will need diuretics for fluid balance  The dose of the diuretics should be proportional to the amount of fluid  gain  Currently, pt appears overdiuresed (improved edema, low BP, elevated HR): will lower lasix dose from 40 mg bid to 40 mg po qd  Advised pt that is SOB or LE swelling returned, incease the dose back to bid     H/o of DM and HTN  Was on HD x 2.5 years before transplant     Complications of CKD:  K normal  Na normal  Acid base stable, metabolic alkalosis due to diuretics  Ca normal  PO4 stable  Secondary hyperparathyroidism, on calcitriol   Anemia, following with hem/onc      2. Immunosuppression  H/o of living related kidney transplant in 2015 form daughter  Prograf level is below therapeutic range  Takes progaf 4 mg am and 5 mg pm, will increase to 5 mg po bid  Will re-start ketoconazole: as been inadvertently stopped. It is being used in conjunction with immunosuppression therapy to keep the prograf level higher, thereby needing a lowe dose.   Meds and doses reviewed with pt     Leukopenia: WBC stable. Pt had previously small drop in WBC  NO lowered cellcept dose from 750 to 500 mg po bid  WBC currently slightly elevated. Related to CMV?     3. Diarrhea in Dec 2022. Has resolved  Last CMV titer by PCR negative   S/p colonoscopy on 6/7/23, found no sign of virally induced inflammation  No sign of CMV colitis on colonoscopy  S/p Valcyte      4. Cardiac: diastolic and systolic CHF  Improved peripheral edema  Appears overdiuresed, will lower lasix to 40 mg po qd, from bid (as above)  Off Metolazone x several months  Limit salt in diet <2-3 g/day  Limit fluids < 1.5 L/day  Advised pt to limit water intake     Previously cardiac amyloid was suspected, was ruled out by above cardiac test  SPEP and UPEP unremarkable, no monoclonality     5. HTN: BP normal to low  Meds reviewed      Plans and recommendations:  As discussed above  Complex visit  Total time spent 40 minutes including time needed to review the records, the   patient evaluation, documentation, face-to-face discussion with the patient,   more than 50% of the  time was spent on coordination of care and counseling.    Level V visit.  RTC 3 months     Hany Gonsalez MD

## 2024-10-04 ENCOUNTER — OFFICE VISIT (OUTPATIENT)
Dept: DIABETES | Facility: CLINIC | Age: 69
End: 2024-10-04
Payer: COMMERCIAL

## 2024-10-04 VITALS
HEART RATE: 105 BPM | DIASTOLIC BLOOD PRESSURE: 59 MMHG | BODY MASS INDEX: 44.98 KG/M2 | WEIGHT: 278.69 LBS | SYSTOLIC BLOOD PRESSURE: 109 MMHG

## 2024-10-04 DIAGNOSIS — Z79.4 TYPE 2 DIABETES MELLITUS WITH STABLE PROLIFERATIVE RETINOPATHY OF BOTH EYES, WITH LONG-TERM CURRENT USE OF INSULIN: ICD-10-CM

## 2024-10-04 DIAGNOSIS — E11.3553 TYPE 2 DIABETES MELLITUS WITH STABLE PROLIFERATIVE RETINOPATHY OF BOTH EYES, WITH LONG-TERM CURRENT USE OF INSULIN: ICD-10-CM

## 2024-10-04 DIAGNOSIS — E11.22 HYPERTENSION ASSOCIATED WITH STAGE 3 CHRONIC KIDNEY DISEASE DUE TO TYPE 2 DIABETES MELLITUS: ICD-10-CM

## 2024-10-04 DIAGNOSIS — E11.42 DIABETIC SENSORIMOTOR POLYNEUROPATHY: ICD-10-CM

## 2024-10-04 DIAGNOSIS — Z94.0 KIDNEY TRANSPLANT STATUS, LIVING RELATED DONOR: Chronic | ICD-10-CM

## 2024-10-04 DIAGNOSIS — I12.9 HYPERTENSION ASSOCIATED WITH STAGE 3 CHRONIC KIDNEY DISEASE DUE TO TYPE 2 DIABETES MELLITUS: ICD-10-CM

## 2024-10-04 DIAGNOSIS — N18.30 HYPERTENSION ASSOCIATED WITH STAGE 3 CHRONIC KIDNEY DISEASE DUE TO TYPE 2 DIABETES MELLITUS: ICD-10-CM

## 2024-10-04 DIAGNOSIS — E11.21 TYPE 2 DIABETES MELLITUS WITH DIABETIC NEPHROPATHY, UNSPECIFIED WHETHER LONG TERM INSULIN USE: ICD-10-CM

## 2024-10-04 DIAGNOSIS — E11.649 UNCONTROLLED TYPE 2 DIABETES MELLITUS WITH HYPOGLYCEMIA WITHOUT COMA: Primary | ICD-10-CM

## 2024-10-04 LAB — GLUCOSE SERPL-MCNC: 300 MG/DL (ref 70–110)

## 2024-10-04 PROCEDURE — 99999 PR PBB SHADOW E&M-EST. PATIENT-LVL III: CPT | Mod: PBBFAC,,, | Performed by: NURSE PRACTITIONER

## 2024-10-04 NOTE — PROGRESS NOTES
Patient ID: Mitch Whittaker is a 68 y.o. male.  Patient's current PCP is Valery Caal MD.   Collaborating Physician: GERRY Ramirez MD    Chief Complaint: Diabetes Mellitus    HPI  Mitch Whittaker is a 68 y.o. Black or  male presenting for a follow up for diabetes. HX KIDNEY TRANSPLANT 2015 - on prednisone 5 mg every am      Patient has been diagnosed with type 2 diabetes since 1982 .  Complications related to diabetes:  nephropathy - Dr Gonsalez ([facility])  Retinopathy - Dr Zuniga  peripheral neuropathy  cardiovascular disease - Dr Muhammad (started Ozempic)  peripheral vascular disease  Recent diabetes related hospitalizations: None recently  Previous diabetes education:YES, years ago    Current diet: Eating 2-3 meals per day. Occasionally skipping lunch. No set plan. Needs better understanding of foods that turn to sugar and sources of sodium. Sees RD next week  Activity level: limited    Changes made at last visit:  Stabilize blood sugar while on prednisone, then readjust for normal daily routine once steroid taper completed  While on prednisone:  Increase Lantus to 30 units twice a day  Increase Novolog to 30 units with each meal plus sliding scale:  Sliding scale for Novolog rapid acting insulin to use before meals  If blood sugar is          <60    =  subtract 2 units   60-80    =  subtract 1 unit     =  No change  141-180 =  add 2 unit  181-220 = add 4 units  221-260 = add 6 units  261-300 = add 8 units  301-340 = add 10 units  > 340     = add 12 units    If NOT eating lunch - Follow sliding scale for the Novolog     Download Dexcom apps: Start Dexcom G7 cgm to assist with treatment decisions and prevent hypoglycemia    Referral to RD placed for assistance with  Carb controlled Renal diet  Carbohydrates: 75 grams per meal or 5 servings per meal for now    Mychart with blood sugars on Tuesday  Refer to Gloria [facility]       Current issues: Low blood sugars in middle of the night. Off high dose  steroids. Now on only daily 5 mg prednisone.      Personal history of pancreatitis: denies  Personal history of abdominal surgery: denies  Personal history of thyroid surgery: denies  Family history of pancreatic cancer in first-degree relative: denies  Family history of MTC/MEN/endocrine tumors: denies     Past Medical History:   Diagnosis Date    Acquired renal cyst of left kidney     Anemia associated with chronic renal failure     CAD (coronary artery disease)     nonobstructive lhc 9/14    CHF (congestive heart failure)     Chronic immunosuppression with Prograf and MMF 06/18/2015    Chronic venous insufficiency of lower extremity     CKD (chronic kidney disease) stage 3, GFR 30-59 ml/min     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 RNA NEGATIVE 5/2015, 2021, 2022    History of colon polyps     HPTH (hyperparathyroidism)     Hyperlipidemia     Hypertension associated with stage 3 chronic kidney disease due to type 2 diabetes mellitus     LBBB (left bundle branch block) 12/20/2021    Morbid obesity with BMI of 45.0-49.9, adult     Nephrolithiasis 6/7/2013    PCO (posterior capsular opacification), left 03/04/2019    Proteinuria     resolved s/p transplant    S/P kidney transplant     Sleep apnea     Type 2 diabetes, uncontrolled, with retinopathy     Type II diabetes mellitus with renal manifestations      Social History     Socioeconomic History    Marital status:     Number of children: 2   Occupational History    Occupation: retired     Employer: Retired   Tobacco Use    Smoking status: Former     Current packs/day: 0.00     Types: Cigarettes     Quit date:  2013     Years since quittin.3     Passive exposure: Past    Smokeless tobacco: Former     Quit date: 2013    Tobacco comments:     used marijuana since 1300-6813, stopped after started dialysis   Substance and Sexual Activity    Alcohol use: No     Alcohol/week: 0.0 standard drinks of alcohol    Drug use: Not Currently     Comment:      Sexual activity: Never   Social History Narrative    . Lives with spouse. Has 2 children. Patient retired as  for [employer]. He has been washing cars.     Social Drivers of Health     Financial Resource Strain: Low Risk  (10/2/2020)    Overall Financial Resource Strain (CARDIA)     Difficulty of Paying Living Expenses: Not hard at all   Transportation Needs: No Transportation Needs (10/2/2020)    PRAPARE - Transportation     Lack of Transportation (Medical): No     Lack of Transportation (Non-Medical): No   Stress: No Stress Concern Present (10/2/2020)    [employer] [place] [employer] - Occupational Stress Questionnaire     Feeling of Stress : Not at all       Review of patient's allergies indicates:   Allergen Reactions    Lisinopril Other (See Comments)     Other reaction(s):  cough    Actos  [pioglitazone] Other (See Comments)     Other reaction(s): CHF    Metformin Other (See Comments)     Other reaction(s): renal insuff  Other reaction(s): CHF       CURRENT DM MEDICATIONS:   Diabetes Medications               NOVOLOG FLEXPEN U-100 INSULIN 100 unit/mL (3 mL) InPn pen Inject 25 Units into the skin 3 (three) times daily with meals.    semaglutide (OZEMPIC) 2 mg/dose (8 mg/3 mL) PnIj Inject 2 mg into the skin every 7 days.    insulin glargine U-100, Lantus, (LANTUS SOLOSTAR U-100 INSULIN) 100 unit/mL (3 mL) InPn pen Inject 25 Units into the skin 2 (two) times daily.     Continues to hold insulin if premeal blood sugar is low- normal     Past failed treatment(s) include:   Actos - CHF  Metformin - CRI  Glimepiride -  "hypoglycemia    Meter/cgm: meter  Blood glucose testing is performed regularly.   Patient is testing 1-3 times per day.  Any episodes of hypoglycemia? Yes, now occurring about 4 am. Holding insulin when low in ams  Glucose trends:   Not with him for review. Will start Dexcom with RD next week    His blood sugar in the clinic today was:   Lab Results   Component Value Date    POCGLU 300 (A) 10/04/2024   Did not take Novolog with meal this am    Statin: Taking  ACE/ARB: Not taking    Labs reviewed and are noted below.    Screening or Prevention Patient's value Goal Complete/Controlled?   HgA1C Testing and Control   Lab Results   Component Value Date    HGBA1C 7.4 (H) 06/18/2024      Annually/Less than 8% Yes   Lipid profile : 12/20/2023 Annually Yes   LDL control Lab Results   Component Value Date    LDLCALC 128.4 12/20/2023    Annually/Less than 100 mg/dl  No   Nephropathy screening Lab Results   Component Value Date    MICALBCREAT 10.0 09/17/2024     Lab Results   Component Value Date    PROTEINUA Negative 03/18/2024    Annually Yes   Blood pressure BP Readings from Last 1 Encounters:   10/04/24 (!) 109/59    Less than 140/90 Yes   Dilated retinal exam : 06/28/2024 Annually Yes    Foot exam   : 10/10/2022 Annually No     Glucose   Date Value Ref Range Status   09/17/2024 208 (H) 70 - 110 mg/dL Final     Anion Gap   Date Value Ref Range Status   09/17/2024 11 8 - 16 mmol/L Final     eGFR if    Date Value Ref Range Status   07/06/2022 22.7 (A) >60 mL/min/1.73 m^2 Final     eGFR if non    Date Value Ref Range Status   07/06/2022 19.6 (A) >60 mL/min/1.73 m^2 Final     Comment:     Calculation used to obtain the estimated glomerular filtration  rate (eGFR) is the CKD-EPI equation.        Lab Results   Component Value Date    FREET4 0.97 07/08/2013    TSH 0.999 03/11/2022     No results found for: "CPEPTIDE"  No results found for: "GLUTAMICACID"    Wt Readings from Last 3 Encounters: "   10/04/24 1511 126.4 kg (278 lb 10.6 oz)   09/24/24 1120 125 kg (275 lb 9.2 oz)   08/30/24 1506 125.8 kg (277 lb 5.4 oz)       Review of Systems   Constitutional:  Negative for malaise/fatigue and weight loss.   Eyes:  Negative for blurred vision and double vision.   Respiratory:  Positive for shortness of breath. Negative for wheezing.    Cardiovascular:  Positive for leg swelling.   Gastrointestinal: Negative.    Genitourinary:  Positive for frequency.   Musculoskeletal:  Negative for myalgias.   Neurological: Negative.    Endo/Heme/Allergies:  Positive for polydipsia.   Psychiatric/Behavioral: Negative.         Physical Exam  Vitals reviewed.   Constitutional:       General: He is not in acute distress.     Appearance: Normal appearance. He is obese.   Eyes:      Conjunctiva/sclera: Conjunctivae normal.      Pupils: Pupils are equal, round, and reactive to light.   Cardiovascular:      Rate and Rhythm: Normal rate and regular rhythm.      Pulses: Normal pulses.      Heart sounds: Normal heart sounds.   Pulmonary:      Breath sounds: No wheezing (scattered bilaterally) or rales.   Abdominal:      General: There is no distension.      Palpations: Abdomen is soft.   Musculoskeletal:      Right lower leg: Edema present.      Left lower leg: Edema present.   Skin:     General: Skin is warm and dry.      Comments: Chronic venous stasis changes noted to LE bilaterally. No signs of acute infection   Neurological:      General: No focal deficit present.      Mental Status: He is alert and oriented to person, place, and time.   Psychiatric:         Mood and Affect: Mood normal.           Assessment & Plan    Uncontrolled type 2 diabetes mellitus with hypoglycemia without coma  -     POCT Glucose, Hand-Held Device    Type 2 diabetes mellitus with diabetic nephropathy, unspecified whether long term insulin use    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, with long-term current use of  insulin    Diabetic sensorimotor polyneuropathy    Living-related donor kidney transplant (daughter) - 6/12/15    Hypertension associated with stage 3 chronic kidney disease due to type 2 diabetes mellitus - Managed by Renal. Diuretics lowered. On arb    Change Lantus to 25 units in the morning and 15 units at night  Change Novolog to 10 units before each meal plus sliding scale (Food insulin)  If blood sugar is          <60    =  subtract 2 units   60-80    =  subtract 1 unit     =  No change - still take food insulin  141-180 =  add 1 unit  181-220 = add 2 units  221-260 = add 3 units  261-300 = add 4 units  301-340 = add 5 units  > 340     = add 6 units  Do not hold insulin before meals IF eating  Start Dexcom G7 to assist with treatment decisions  Call if bs < 80  Keep appointment next week with RD/DM ED      - Follow up:  4 weeks    Visit today included increased complexity associated with the care of the episodic problem fluctuating glucoses addressed and managing the longitudinal care of the patient due to the serious and/or complex managed problem(s) T2dm.

## 2024-10-04 NOTE — PATIENT INSTRUCTIONS
Change Lantus to 25 units in the morning and 15 units at night    Change Novolog to 10 units before each meal plus sliding scale (Food insulin)  If blood sugar is          <60    =  subtract 2 units   60-80    =  subtract 1 unit     =  No change - still take food insulin  141-180 =  add 1 unit  181-220 = add 2 units  221-260 = add 3 units  261-300 = add 4 units  301-340 = add 5 units  > 340     = add 6 units    Download APPS: Dexcom G7 and Dexcom Clarity     You will need Apple password

## 2024-10-07 ENCOUNTER — OFFICE VISIT (OUTPATIENT)
Dept: OPHTHALMOLOGY | Facility: CLINIC | Age: 69
End: 2024-10-07
Payer: COMMERCIAL

## 2024-10-07 DIAGNOSIS — H11.32 SUBCONJUNCTIVAL HEMORRHAGE OF LEFT EYE: ICD-10-CM

## 2024-10-07 DIAGNOSIS — H10.12 ALLERGIC CONJUNCTIVITIS OF LEFT EYE: Primary | ICD-10-CM

## 2024-10-07 PROCEDURE — 99999 PR PBB SHADOW E&M-EST. PATIENT-LVL II: CPT | Mod: PBBFAC,,, | Performed by: OPHTHALMOLOGY

## 2024-10-07 PROCEDURE — 1160F RVW MEDS BY RX/DR IN RCRD: CPT | Mod: CPTII,S$GLB,, | Performed by: OPHTHALMOLOGY

## 2024-10-07 PROCEDURE — 3066F NEPHROPATHY DOC TX: CPT | Mod: CPTII,S$GLB,, | Performed by: OPHTHALMOLOGY

## 2024-10-07 PROCEDURE — 1159F MED LIST DOCD IN RCRD: CPT | Mod: CPTII,S$GLB,, | Performed by: OPHTHALMOLOGY

## 2024-10-07 PROCEDURE — 4010F ACE/ARB THERAPY RXD/TAKEN: CPT | Mod: CPTII,S$GLB,, | Performed by: OPHTHALMOLOGY

## 2024-10-07 PROCEDURE — 3051F HG A1C>EQUAL 7.0%<8.0%: CPT | Mod: CPTII,S$GLB,, | Performed by: OPHTHALMOLOGY

## 2024-10-07 PROCEDURE — 99213 OFFICE O/P EST LOW 20 MIN: CPT | Mod: S$GLB,,, | Performed by: OPHTHALMOLOGY

## 2024-10-07 PROCEDURE — 3061F NEG MICROALBUMINURIA REV: CPT | Mod: CPTII,S$GLB,, | Performed by: OPHTHALMOLOGY

## 2024-10-07 RX ORDER — NEOMYCIN SULFATE, POLYMYXIN B SULFATE AND DEXAMETHASONE 3.5; 10000; 1 MG/ML; [USP'U]/ML; MG/ML
1 SUSPENSION/ DROPS OPHTHALMIC 4 TIMES DAILY
Qty: 5 ML | Refills: 0 | Status: SHIPPED | OUTPATIENT
Start: 2024-10-07 | End: 2024-10-14

## 2024-10-10 ENCOUNTER — CLINICAL SUPPORT (OUTPATIENT)
Dept: DIABETES | Facility: CLINIC | Age: 69
End: 2024-10-10
Payer: COMMERCIAL

## 2024-10-10 DIAGNOSIS — Z94.0 KIDNEY TRANSPLANT STATUS, LIVING RELATED DONOR: Chronic | ICD-10-CM

## 2024-10-10 DIAGNOSIS — E11.21 TYPE 2 DIABETES MELLITUS WITH DIABETIC NEPHROPATHY, UNSPECIFIED WHETHER LONG TERM INSULIN USE: ICD-10-CM

## 2024-10-10 DIAGNOSIS — I50.42 CHRONIC COMBINED SYSTOLIC AND DIASTOLIC CONGESTIVE HEART FAILURE: ICD-10-CM

## 2024-10-10 PROCEDURE — G0108 DIAB MANAGE TRN  PER INDIV: HCPCS | Mod: S$GLB,,, | Performed by: DIETITIAN, REGISTERED

## 2024-10-10 PROCEDURE — 99999 PR PBB SHADOW E&M-EST. PATIENT-LVL IV: CPT | Mod: PBBFAC,,, | Performed by: DIETITIAN, REGISTERED

## 2024-10-11 NOTE — PROGRESS NOTES
===============================  Date today is 10/7/2024  Mitch Whittaker is a 68 y.o. male  Last visit [facility]: :11/17/2023   Last visit eye dept. Visit date not found    Uncorrected distance visual acuity was 20/20 in the right eye and 20/40 in the left eye.  Not recorded       Not recorded       Not recorded       Not recorded       Chief Complaint   Patient presents with    Eye Problem     blood shot swollen eye     HPI     Eye Problem            Comments: blood shot swollen eye          Comments    Blood shot OS started on Friday with swelling and discharge.           Last edited by Thuy Handley on 10/7/2024  1:40 PM.      Problem List Items Addressed This Visit    None  Visit Diagnoses       Allergic conjunctivitis of left eye    -  Primary    Subconjunctival hemorrhage of left eye              Instructed to call 24/7 for any worsening of vision, visual distortion or pain.  Check OU independently daily.    Gave my office and personal cell phone number.  ________________  10/7/2024 today  Mitch Whittaker    OS allergic conjunctivitis with subconj heme  Start maxitrol QID for 1 week    RTC as scheduled  =============================

## 2024-10-11 NOTE — PROGRESS NOTES
Diabetes Care Specialist Progress Note  Author: Kathie Arenas RD, CDE  Date: 10/11/2024    Intake    Program Intake  Reason for Diabetes Program Visit:: Initial Diabetes Assessment  Current diabetes risk level:: high  In the last 12 months, have you:: none  Permission to speak with others about care:: no    Current Diabetes Treatment: Insulin, DM Injectables  DM Injectables Type/Dose: Ozempic 2mg q7d  Method of insulin delivery?: Injections  Injection Type: Pens  Pen Type/Dose: Novolog 30u with meals + SS // Lantus 30u BID    Continuous Glucose Monitoring  Patient has CGM: No    Lab Results   Component Value Date    HGBA1C 7.4 (H) 06/18/2024     Lifestyle Coping Support & Clinical    Lifestyle/Coping/Support  Does anyone in your family have diabetes or does anyone in your family support you in your diabetes care?: grandmother had diabetes, wife and daughters are supportive  Learning Barriers:: None  Culture or Yazidism beliefs that may impact ability to access healthcare: No  Psychosocial/Coping Skills Assessment Completed: : Yes  Assessment indicates:: Adequate understanding  Area of need?: No    Problem Review  Active Comorbidities: Hypertension, Chronic Kidney Disease, Organ Transplant    Diabetes Self-Management Skills Assessment    Medication Skills Assessment  Patient is able to identify current diabetes medications, dosages, and appropriate timing of medications.: yes  Patient reports problems or concerns with current medication regimen.: no  Patient is  aware that some diabetes medications can cause low blood sugar?: Yes  Medication Skills Assessment Completed:: Yes  Assessment indicates:: Adequate understanding  Area of need?: No    Diabetes Disease Process/Treatment Options  Diabetes Type?: Type II  When were you diagnosed?: over 20 years ago  If previous diabetes education, when/where:: Yes  What are your goals for this education session?: CGM training and diet  Diabetes Disease Process/Treatment  Options: Skills Assessment Completed: Yes  Assessment indicates:: Adequate understanding  Area of need?: No    Nutrition/Healthy Eating  Meal Plan 24 Hour Recall - Breakfast: scrambled egg, turkey sausage, biscuit  Meal Plan 24 Hour Recall - Lunch: 1/2 portion of chopped beef, carrot souffle, broccoli  Meal Plan 24 Hour Recall - Dinner: 1/2 portion of chopped beef, carrot souffle, broccoli  Meal Plan 24 Hour Recall - Snack: none  Meal Plan 24 Hour Recall - Beverage: coffee with creamer and teaspoon of sugar, water throughout the day  Who shops/cooks?: daughter does the shopping and cooking  Patient can identify foods that impact blood sugar.: yes  Challenges to healthy eating:: portion control  Nutrition/Healthy Eating Skills Assessment Completed:: Yes  Assessment indicates:: Instruction Needed, Knowledge deficit  Area of need?: Yes         Home Blood Glucose Monitoring  Patient states that blood sugar is checked at home daily.: yes  Monitoring Method:: home glucometer  Fasting BG range history:: low 100's  Premeal BG range history:: low 100's  Home Blood Glucose Monitoring Skills Assessment Completed: : Yes  Assessment indicates:: Instruction Needed, Knowledge deficit  Area of need?: Yes    Acute Complications  Have you ever had hypoglycemia (low BG 70 or less)?: yes  How often and what are your symptoms?: weekly, body tingles, gets hot  How do you treat hypoglycemia?: orange juice  Have you ever had hyperglycemia (high  or more)?: yes  How often and what are your symptoms?: not often  How do you treat hyperglycemia? : drink water, take insulin  Have you ever had DKA?: no  Do you ever test for ketones?: no  Acute Complications Skills Assessment Completed: : Yes  Assessment indicates:: Instruction Needed  Area of need?: No           Assessment Summary and Plan    Based on today's diabetes care assessment, the following areas of need were identified:           10/10/2024   Areas of Need   Medications/Current  "Diabetes Treatment Reviewed current diabetes medications and prescribed doses.   Discussed MOA, onset, side effects, dosage of Novolog and Lantus.    Lifestyle Coping Support No   Diabetes Disease Process/Treatment Options No   Nutrition/Healthy Eating Yes- see care plan.    Home Blood Glucose Monitoring Yes- see care plan.    Acute Complications Reviewed blood glucose goals, prevention, detection, signs and symptoms, and treatment of hypoglycemia and hyperglycemia, and when to contact the clinic.                 Today's interventions were provided through individual discussion, instruction, and written materials were provided.      Patient verbalized understanding of instruction and written materials.  Pt was able to return back demonstration of instructions today. Patient understood key points, needs reinforcement and further instruction.     Diabetes Self-Management Care Plan:    Today's Diabetes Self-Management Care Plan was developed with Mitch's input. Mitch has agreed to work toward the following goal(s) to improve his/her overall diabetes control.      Care Plan: Diabetes Management   Updates made since 9/11/2024 12:00 AM        Problem: Blood Glucose Self-Monitoring         Goal: Patient will use Dexcom G7 to monitor BG and make treatment decisions.    Start Date: 10/11/2024   Expected End Date: 11/11/2024   Priority: High   Barriers: No Barriers Identified        Task: Trained patient to use Dexcom G7. Completed 10/11/2024   Note:    DEXCOM G7 CGM TRAINING  Dexcom G7 mobile apps downloaded to phone. Education was provided using "Quick Start Guide" and demo device per Dexcom protocol. [facility] data share set-up.        Overview:  5 minute glucose reading updates, trending arrows, BG graph screens, reports screen,  connectivity and functions.   Menus: Trend graph, start sensor, enter BG, events, alerts, settings, replace sensor, stop sensor, and shutdown  Dexcom G7 mobile pérez/ " Settings:                          * Urgent Low: 55 mg/dL                          * Urgent Low Soon: On                          * Low Alert: 70 mg/dL; Snooze OFF                          * High Alert: 250 mg/dL; Snooze OFF                           * Signal Loss: ON                          * Brief Sensor Issue: ON     Reviewed additional setting options.  Patient paired sensor using 4-digit code listed on sensor cap..    Reviewed where to find sensor insertion time and expiration date.   Reviewed appropriate calibration techniques.  Reviewed sensor site selection. Patient selected and prepped site using aseptic technique, inserted sensor, applied overlay tape and started session.   Reviewed sensor removal from site. Discussed times to remove sensor per  guidelines include MRI or diatherapy.   Patient able to demonstrate without difficulty. Encouraged to review manual and/or training videos prior to starting another sensor.   Reviewed problem solving aspects of sensor transmission/variables that can disrupt RF transmission.  range 20 feet, but the first 3 hrs keep within 3 feet of transmitter.  Patient instructed on lag time of interstitial fluid from CBG and was advised to treat hypoglycemia and dose insulin based on SMBG values.  Dexcom technical support contact number given and examples of when to contact them discussed.            Problem: Healthy Eating         Goal: Eat 3 meals daily with <60g/ up to 4 servings of Carbohydrate per meal.    Start Date: 10/11/2024   Expected End Date: 4/11/2025   Priority: Medium   Barriers: No Barriers Identified   Note:    Patient is practicing portion control. Usually eats 1/2 of plate lunch at lunch and the rest at dinner.  Daughter is supportive. Cooks and shops for patient and wife.        Task: Reviewed the sources and role of Carbohydrate, Protein, and Fat and how each nutrient impacts blood sugar. Completed 10/11/2024        Task: Provided visual  examples using dry measuring cups, food models, and other familiar objects such as computer mouse, deck or cards, tennis ball etc. to help with visualization of portions. Completed 10/11/2024        Task: Review the importance of balancing carbohydrates with each meal using portion control techniques to count servings of carbohydrate and label reading to identify serving size and amount of total carbs per serving. Completed 10/11/2024        Task: Provided Sample plate method and reviewed the use of the plate to estimate amounts of carbohydrate per meal. Completed 10/11/2024          Follow Up Plan     Follow up in about 1 month (around 11/10/2024) for E11.21.    Today's care plan and follow up schedule was discussed with patient.  Mitch verbalized understanding of the care plan, goals, and agrees to follow up plan.        The patient was encouraged to communicate with his/her health care provider/physician and care team regarding his/her condition(s) and treatment.  I provided the patient with my contact information today and encouraged to contact me via phone or [facility] Patient Portal as needed.     Length of Visit   Total Time: 60 Minutes

## 2024-10-31 ENCOUNTER — TELEPHONE (OUTPATIENT)
Dept: NEPHROLOGY | Facility: CLINIC | Age: 69
End: 2024-10-31
Payer: COMMERCIAL

## 2024-11-08 ENCOUNTER — OFFICE VISIT (OUTPATIENT)
Dept: DIABETES | Facility: CLINIC | Age: 69
End: 2024-11-08
Payer: COMMERCIAL

## 2024-11-08 VITALS
SYSTOLIC BLOOD PRESSURE: 116 MMHG | DIASTOLIC BLOOD PRESSURE: 64 MMHG | WEIGHT: 279.13 LBS | BODY MASS INDEX: 45.05 KG/M2 | HEART RATE: 101 BPM

## 2024-11-08 DIAGNOSIS — I50.42 CHRONIC COMBINED SYSTOLIC AND DIASTOLIC CONGESTIVE HEART FAILURE: ICD-10-CM

## 2024-11-08 DIAGNOSIS — E11.3553 TYPE 2 DIABETES MELLITUS WITH STABLE PROLIFERATIVE RETINOPATHY OF BOTH EYES, WITH LONG-TERM CURRENT USE OF INSULIN: ICD-10-CM

## 2024-11-08 DIAGNOSIS — E66.01 MORBID OBESITY WITH BMI OF 40.0-44.9, ADULT: ICD-10-CM

## 2024-11-08 DIAGNOSIS — Z79.4 TYPE 2 DIABETES MELLITUS WITH STABLE PROLIFERATIVE RETINOPATHY OF BOTH EYES, WITH LONG-TERM CURRENT USE OF INSULIN: ICD-10-CM

## 2024-11-08 DIAGNOSIS — E11.21 TYPE 2 DIABETES MELLITUS WITH DIABETIC NEPHROPATHY, UNSPECIFIED WHETHER LONG TERM INSULIN USE: Primary | ICD-10-CM

## 2024-11-08 DIAGNOSIS — M79.89 SWELLING OF RIGHT HAND: ICD-10-CM

## 2024-11-08 DIAGNOSIS — Z94.0 KIDNEY TRANSPLANTED: ICD-10-CM

## 2024-11-08 LAB — GLUCOSE SERPL-MCNC: 90 MG/DL (ref 70–110)

## 2024-11-08 PROCEDURE — 82962 GLUCOSE BLOOD TEST: CPT | Mod: S$GLB,,, | Performed by: NURSE PRACTITIONER

## 2024-11-08 PROCEDURE — G2211 COMPLEX E/M VISIT ADD ON: HCPCS | Mod: S$GLB,,, | Performed by: NURSE PRACTITIONER

## 2024-11-08 PROCEDURE — 1160F RVW MEDS BY RX/DR IN RCRD: CPT | Mod: CPTII,S$GLB,, | Performed by: NURSE PRACTITIONER

## 2024-11-08 PROCEDURE — 4010F ACE/ARB THERAPY RXD/TAKEN: CPT | Mod: CPTII,S$GLB,, | Performed by: NURSE PRACTITIONER

## 2024-11-08 PROCEDURE — 3008F BODY MASS INDEX DOCD: CPT | Mod: CPTII,S$GLB,, | Performed by: NURSE PRACTITIONER

## 2024-11-08 PROCEDURE — 1159F MED LIST DOCD IN RCRD: CPT | Mod: CPTII,S$GLB,, | Performed by: NURSE PRACTITIONER

## 2024-11-08 PROCEDURE — 3051F HG A1C>EQUAL 7.0%<8.0%: CPT | Mod: CPTII,S$GLB,, | Performed by: NURSE PRACTITIONER

## 2024-11-08 PROCEDURE — 99999 PR PBB SHADOW E&M-EST. PATIENT-LVL V: CPT | Mod: PBBFAC,,, | Performed by: NURSE PRACTITIONER

## 2024-11-08 PROCEDURE — 1126F AMNT PAIN NOTED NONE PRSNT: CPT | Mod: CPTII,S$GLB,, | Performed by: NURSE PRACTITIONER

## 2024-11-08 PROCEDURE — 1101F PT FALLS ASSESS-DOCD LE1/YR: CPT | Mod: CPTII,S$GLB,, | Performed by: NURSE PRACTITIONER

## 2024-11-08 PROCEDURE — 3066F NEPHROPATHY DOC TX: CPT | Mod: CPTII,S$GLB,, | Performed by: NURSE PRACTITIONER

## 2024-11-08 PROCEDURE — 3061F NEG MICROALBUMINURIA REV: CPT | Mod: CPTII,S$GLB,, | Performed by: NURSE PRACTITIONER

## 2024-11-08 PROCEDURE — 95251 CONT GLUC MNTR ANALYSIS I&R: CPT | Mod: S$GLB,,, | Performed by: NURSE PRACTITIONER

## 2024-11-08 PROCEDURE — 99214 OFFICE O/P EST MOD 30 MIN: CPT | Mod: S$GLB,,, | Performed by: NURSE PRACTITIONER

## 2024-11-08 PROCEDURE — 3288F FALL RISK ASSESSMENT DOCD: CPT | Mod: CPTII,S$GLB,, | Performed by: NURSE PRACTITIONER

## 2024-11-08 PROCEDURE — 3078F DIAST BP <80 MM HG: CPT | Mod: CPTII,S$GLB,, | Performed by: NURSE PRACTITIONER

## 2024-11-08 PROCEDURE — 3074F SYST BP LT 130 MM HG: CPT | Mod: CPTII,S$GLB,, | Performed by: NURSE PRACTITIONER

## 2024-11-08 NOTE — PATIENT INSTRUCTIONS
Ozempic 2 mg once a week      Lower Lantus to 20 units in am and 10 units at night    Change Novolog to 10 units before breakfast plus sliding scale (Food insulin)                                    0 units before lunch plus sliding scale                                    8 units before supper plus sliding scale    If blood sugar is          <60    =  subtract 2 units   60-80    =  subtract 1 unit     =  No change - still take food insulin  141-180 =  add 1 unit  181-220 = add 2 units  221-260 = add 3 units  261-300 = add 4 units  301-340 = add 5 units  > 340     = add 6 units

## 2024-11-08 NOTE — PROGRESS NOTES
Patient ID: Mitch Whittaker is a 68 y.o. male.  Patient's current PCP is Valery Caal MD.   Collaborating Physician: GERRY Ramirez MD    Chief Complaint: No chief complaint on file.    HPI  Mitch Whittaker is a 68 y.o. Black or  male presenting for a follow up for diabetes. HX KIDNEY TRANSPLANT 2015 - on prednisone 5 mg every am      Patient has been diagnosed with type 2 diabetes since 1982 .  Complications related to diabetes:  nephropathy - Dr Gonsalez ([place])  Retinopathy - Dr Zuniga  peripheral neuropathy  cardiovascular disease - Dr Muhammad (started Ozempic)  peripheral vascular disease  Recent diabetes related hospitalizations: None recently  Previous diabetes education:YES, years ago    Current diet: Eating 3 meals per day. Occasionally skipping lunch. Working with RD. Doing much better with family support  Activity level: limited    Changes made at last visit:  Stabilize blood sugar while on prednisone, then readjust for normal daily routine once steroid taper completed  While on prednisone:  Increase Lantus to 30 units twice a day  Increase Novolog to 30 units with each meal plus sliding scale:  Sliding scale for Novolog rapid acting insulin to use before meals  If blood sugar is          <60    =  subtract 2 units   60-80    =  subtract 1 unit     =  No change  141-180 =  add 2 unit  181-220 = add 4 units  221-260 = add 6 units  261-300 = add 8 units  301-340 = add 10 units  > 340     = add 12 units    If NOT eating lunch - Follow sliding scale for the Novolog     Download Dexcom apps: Start Dexcom G7 cgm to assist with treatment decisions and prevent hypoglycemia    Referral to RD placed for assistance with  Carb controlled Renal diet  Carbohydrates: 75 grams per meal or 5 servings per meal for now    Mychart with blood sugars on Tuesday  Refer to [facility]       Current issues: Low blood sugars in middle of the night. Off high dose steroids. Now on only daily 5 mg prednisone.  Increased LE swelling with change in diuretics. Swelling and pain to right hand after trying to lift water cooler.      Personal history of pancreatitis: denies  Personal history of abdominal surgery: denies  Personal history of thyroid surgery: denies  Family history of pancreatic cancer in first-degree relative: denies  Family history of MTC/MEN/endocrine tumors: denies     Past Medical History:   Diagnosis Date    Acquired renal cyst of left kidney     Anemia associated with chronic renal failure     CAD (coronary artery disease)     nonobstructive lhc 9/14    CHF (congestive heart failure)     Chronic immunosuppression with Prograf and MMF 06/18/2015    Chronic venous insufficiency of lower extremity     CKD (chronic kidney disease) stage 3, GFR 30-59 ml/min     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 RNA NEGATIVE 5/2015, 2021, 2022    History of colon polyps     HPTH (hyperparathyroidism)     Hyperlipidemia     Hypertension associated with stage 3 chronic kidney disease due to type 2 diabetes mellitus     LBBB (left bundle branch block) 12/20/2021    Morbid obesity with BMI of 45.0-49.9, adult     Nephrolithiasis 6/7/2013    PCO (posterior capsular opacification), left 03/04/2019    Proteinuria     resolved s/p transplant    S/P kidney transplant     Sleep apnea     Type 2 diabetes, uncontrolled, with retinopathy     Type II diabetes mellitus with renal manifestations      Social History     Socioeconomic History    Marital status:     Number of children: 2   Occupational History    Occupation: retired     Employer: Retired   Tobacco Use    Smoking status: Former      Current packs/day: 0.00     Types: Cigarettes     Quit date: 2013     Years since quittin.4     Passive exposure: Past    Smokeless tobacco: Former     Quit date: 2013    Tobacco comments:     used marijuana since [id number], stopped after started dialysis   Substance and Sexual Activity    Alcohol use: No     Alcohol/week: 0.0 standard drinks of alcohol    Drug use: Not Currently     Comment:      Sexual activity: Never   Social History Narrative    . Lives with spouse. Has 2 children. Patient retired as  for [employer]  [place]. He has been washing cars.     Social Drivers of Health     Financial Resource Strain: Low Risk  (10/2/2020)    Overall Financial Resource Strain (CARDIA)     Difficulty of Paying Living Expenses: Not hard at all   Transportation Needs: No Transportation Needs (10/2/2020)    PRAPARE - Transportation     Lack of Transportation (Medical): No     Lack of Transportation (Non-Medical): No   Stress: No Stress Concern Present (10/2/2020)    [employer] [employer] - Occupational Stress Questionnaire     Feeling of Stress : Not at all       Review of patient's allergies indicates:   Allergen Reactions    Lisinopril Other (See Comments)     Other reaction(s):  cough    Actos  [pioglitazone] Other (See Comments)     Other reaction(s): CHF    Metformin Other (See Comments)     Other reaction(s): renal insuff  Other reaction(s): CHF       CURRENT DM MEDICATIONS:   Diabetes Medications               NOVOLOG FLEXPEN U-100 INSULIN 100 unit/mL (3 mL) InPn pen Inject 25 Units into the skin 3 (three) times daily with meals.    semaglutide (OZEMPIC) 2 mg/dose (8 mg/3 mL) PnIj Inject 2 mg into the skin every 7 days.    insulin glargine U-100, Lantus, (LANTUS SOLOSTAR U-100 INSULIN) 100 unit/mL (3 mL) InPn pen Inject 25 Units into the skin 2 (two) times daily.          Past failed treatment(s) include:   Actos - CHF  Metformin - CRI  Glimepiride -  hypoglycemia    Meter/cgm: Dexcom G7  Blood glucose testing is performed regularly.   Patient is testing Continuously times per day.  Any episodes of hypoglycemia? Yes, dropping after ,lunch and late night  Glucose trends: Per Dexcom download, for the last 14 days:    Average glucose of 130 mg/dL. He is 94% in range. 4% of readings are mildly elevated with 1% of readings > 250. 1% hypoglycemia. SD 34 mg/dL. Estimated GMI 6.4%. Trending down overnight and after lunch. See changes made below        His blood sugar in the clinic today was:   Lab Results   Component Value Date    POCGLU 90 11/08/2024       Statin: Taking  ACE/ARB: Not taking    Labs reviewed and are noted below.    Screening or Prevention Patient's value Goal Complete/Controlled?   HgA1C Testing and Control   Lab Results   Component Value Date    HGBA1C 7.4 (H) 06/18/2024      Annually/Less than 8% Yes   Lipid profile : 12/20/2023 Annually Yes   LDL control Lab Results   Component Value Date    LDLCALC 128.4 12/20/2023    Annually/Less than 100 mg/dl  No   Nephropathy screening Lab Results   Component Value Date    MICALBCREAT 10.0 09/17/2024     Lab Results   Component Value Date    PROTEINUA Negative 03/18/2024    Annually Yes   Blood pressure BP Readings from Last 1 Encounters:   11/08/24 116/64    Less than 140/90 Yes   Dilated retinal exam : 06/28/2024 Annually Yes    Foot exam   : 10/10/2022 Annually No     Glucose   Date Value Ref Range Status   09/17/2024 208 (H) 70 - 110 mg/dL Final     Anion Gap   Date Value Ref Range Status   09/17/2024 11 8 - 16 mmol/L Final     eGFR if    Date Value Ref Range Status   07/06/2022 22.7 (A) >60 mL/min/1.73 m^2 Final     eGFR if non    Date Value Ref Range Status   07/06/2022 19.6 (A) >60 mL/min/1.73 m^2 Final     Comment:     Calculation used to obtain the estimated glomerular filtration  rate (eGFR) is the CKD-EPI equation.        Lab Results   Component Value Date    FREET4  "0.97 07/08/2013    TSH 0.999 03/11/2022     No results found for: "CPEPTIDE"  No results found for: "GLUTAMICACID"    Wt Readings from Last 3 Encounters:   11/08/24 1505 126.6 kg (279 lb 1.6 oz)   10/04/24 1511 126.4 kg (278 lb 10.6 oz)   09/24/24 1120 125 kg (275 lb 9.2 oz)       Review of Systems   Constitutional:  Negative for malaise/fatigue and weight loss.   Eyes:  Negative for blurred vision and double vision.   Respiratory:  Positive for shortness of breath. Negative for wheezing.    Cardiovascular:  Positive for leg swelling.   Gastrointestinal: Negative.    Genitourinary:  Positive for frequency.   Musculoskeletal:  Negative for myalgias.   Neurological: Negative.    Endo/Heme/Allergies:  Negative for polydipsia.   Psychiatric/Behavioral: Negative.         Physical Exam  Vitals reviewed.   Constitutional:       General: He is not in acute distress.     Appearance: Normal appearance. He is obese.   Eyes:      Conjunctiva/sclera: Conjunctivae normal.      Pupils: Pupils are equal, round, and reactive to light.   Cardiovascular:      Rate and Rhythm: Normal rate and regular rhythm.      Pulses: Normal pulses.      Heart sounds: Normal heart sounds.   Pulmonary:      Breath sounds: No wheezing or rales.   Abdominal:      General: There is no distension.      Palpations: Abdomen is soft.   Musculoskeletal:         General: Swelling (right hand), tenderness (right hand) and signs of injury (rigth hand) present.      Right lower leg: Edema present.      Left lower leg: Edema present.   Skin:     General: Skin is warm and dry.      Comments: Chronic venous stasis changes noted to LE bilaterally. No signs of acute infection   Neurological:      General: No focal deficit present.      Mental Status: He is alert and oriented to person, place, and time.   Psychiatric:         Mood and Affect: Mood normal.           Assessment & Plan      Type 2 diabetes mellitus with diabetic nephropathy, unspecified whether long term " insulin use - Much improved  -     POCT Glucose, Hand-Held Device  Continue Ozempic 2 mg weekly  Lower Lantus to 20 units in am and 10 units at night  Change Novolog to 10 units before breakfast plus sliding scale (Food insulin)                                    0 units before lunch plus sliding scale                                    8 units before supper plus sliding scale  If blood sugar is          <60    =  subtract 2 units   60-80    =  subtract 1 unit     =  No change - still take food insulin  141-180 =  add 1 unit  181-220 = add 2 units  221-260 = add 3 units  261-300 = add 4 units  301-340 = add 5 units  > 340     = add 6 units  Call if bs < 80 or > 180 consistently    Morbid obesity with BMI of 40.0-44.9, adult   - Continue diet  - Continue Ozempic    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, with long-term current use of insulin  - as above    Kidney transplanted- managed by specialist  - Noted on chronic steroids    Chronic combined systolic and diastolic congestive heart failure - managed by specialist. No signs of acute CHF today.       Swelling of right hand  - Rec seeing ortho          - Follow up: 6 weeks    Visit today included increased complexity associated with the care of the episodic problem fluctuating glucoses addressed and managing the longitudinal care of the patient due to the serious and/or complex managed problem(s) T2dm.

## 2024-11-10 ENCOUNTER — OFFICE VISIT (OUTPATIENT)
Facility: CLINIC | Age: 69
End: 2024-11-10
Payer: COMMERCIAL

## 2024-11-10 ENCOUNTER — HOSPITAL ENCOUNTER (OUTPATIENT)
Dept: RADIOLOGY | Facility: HOSPITAL | Age: 69
Discharge: HOME | End: 2024-11-10
Attending: STUDENT IN AN ORGANIZED HEALTH CARE EDUCATION/TRAINING PROGRAM
Payer: COMMERCIAL

## 2024-11-10 VITALS — BODY MASS INDEX: 44.86 KG/M2 | WEIGHT: 279.13 LBS | HEIGHT: 66 IN

## 2024-11-10 DIAGNOSIS — M79.641 RIGHT HAND PAIN: ICD-10-CM

## 2024-11-10 DIAGNOSIS — M25.431 PAIN AND SWELLING OF RIGHT WRIST: Primary | ICD-10-CM

## 2024-11-10 DIAGNOSIS — M25.531 PAIN AND SWELLING OF RIGHT WRIST: Primary | ICD-10-CM

## 2024-11-10 DIAGNOSIS — R29.898 RIGHT HAND WEAKNESS: ICD-10-CM

## 2024-11-10 PROCEDURE — 99999 PR PBB SHADOW E&M-EST. PATIENT-LVL III: CPT | Mod: PBBFAC,,, | Performed by: STUDENT IN AN ORGANIZED HEALTH CARE EDUCATION/TRAINING PROGRAM

## 2024-11-10 PROCEDURE — 3066F NEPHROPATHY DOC TX: CPT | Mod: CPTII,S$GLB,, | Performed by: STUDENT IN AN ORGANIZED HEALTH CARE EDUCATION/TRAINING PROGRAM

## 2024-11-10 PROCEDURE — 3008F BODY MASS INDEX DOCD: CPT | Mod: CPTII,S$GLB,, | Performed by: STUDENT IN AN ORGANIZED HEALTH CARE EDUCATION/TRAINING PROGRAM

## 2024-11-10 PROCEDURE — 3061F NEG MICROALBUMINURIA REV: CPT | Mod: CPTII,S$GLB,, | Performed by: STUDENT IN AN ORGANIZED HEALTH CARE EDUCATION/TRAINING PROGRAM

## 2024-11-10 PROCEDURE — 97760 ORTHOTIC MGMT&TRAING 1ST ENC: CPT | Mod: S$GLB,,, | Performed by: STUDENT IN AN ORGANIZED HEALTH CARE EDUCATION/TRAINING PROGRAM

## 2024-11-10 PROCEDURE — 99214 OFFICE O/P EST MOD 30 MIN: CPT | Mod: S$GLB,,, | Performed by: STUDENT IN AN ORGANIZED HEALTH CARE EDUCATION/TRAINING PROGRAM

## 2024-11-10 PROCEDURE — 73130 X-RAY EXAM OF HAND: CPT | Mod: TC,PN,RT

## 2024-11-10 PROCEDURE — 1160F RVW MEDS BY RX/DR IN RCRD: CPT | Mod: CPTII,S$GLB,, | Performed by: STUDENT IN AN ORGANIZED HEALTH CARE EDUCATION/TRAINING PROGRAM

## 2024-11-10 PROCEDURE — 1159F MED LIST DOCD IN RCRD: CPT | Mod: CPTII,S$GLB,, | Performed by: STUDENT IN AN ORGANIZED HEALTH CARE EDUCATION/TRAINING PROGRAM

## 2024-11-10 PROCEDURE — 3051F HG A1C>EQUAL 7.0%<8.0%: CPT | Mod: CPTII,S$GLB,, | Performed by: STUDENT IN AN ORGANIZED HEALTH CARE EDUCATION/TRAINING PROGRAM

## 2024-11-10 PROCEDURE — 1125F AMNT PAIN NOTED PAIN PRSNT: CPT | Mod: CPTII,S$GLB,, | Performed by: STUDENT IN AN ORGANIZED HEALTH CARE EDUCATION/TRAINING PROGRAM

## 2024-11-10 PROCEDURE — 73130 X-RAY EXAM OF HAND: CPT | Mod: 26,RT,, | Performed by: RADIOLOGY

## 2024-11-10 PROCEDURE — 4010F ACE/ARB THERAPY RXD/TAKEN: CPT | Mod: CPTII,S$GLB,, | Performed by: STUDENT IN AN ORGANIZED HEALTH CARE EDUCATION/TRAINING PROGRAM

## 2024-11-10 RX ORDER — PREDNISONE 20 MG/1
40 TABLET ORAL DAILY
Qty: 10 TABLET | Refills: 0 | Status: SHIPPED | OUTPATIENT
Start: 2024-11-10 | End: 2024-11-16

## 2024-11-10 NOTE — PROGRESS NOTES
Patient ID: Mitch Whittaker  YOB: 1955  MRN: [id number]    Referred By: Self    Chief Complaint   Patient presents with    Right Hand - Pain, Swelling       HPI:  Patient is presenting with 3 week onset right hand and wrist pain and swelling.  Pain is currently 9/10 in severity.  Pain all started after he was trying to lift a heavy can not would water bottle and install it, and felt like he heard something pop in the dorsal and ulnar side of his wrist and hand.  A few days later he gradually had worsening swelling, and now pains which we will shoot proximally up his forearm to his elbow.  He is having significant swelling of his hands and wrist, and feels like he is having weakness of his hand and can not bend his finger is like he needs to.  He has chronic kidney disease, so has not been taking any NSAIDs.  He has been taking Tylenol, gabapentin, and using an ice pack.      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 RNA NEGATIVE 5/2015, 2021, 2022    History of colon polyps     HPTH (hyperparathyroidism)     Hyperlipidemia     Hypertension associated with stage 3 chronic kidney disease due to type 2 diabetes mellitus     LBBB (left bundle  branch block) 12/20/2021    Morbid obesity with BMI of 45.0-49.9, adult     Nephrolithiasis 6/7/2013    PCO (posterior capsular opacification), left 03/04/2019    Proteinuria     resolved s/p transplant    S/P kidney transplant     Sleep apnea     Type 2 diabetes, uncontrolled, with retinopathy     Type II diabetes mellitus with renal manifestations      Past Surgical History:   Procedure Laterality Date    CARDIAC CATHETERIZATION  01/01/2008    normal coronary    CARPAL TUNNEL RELEASE Right 12/01/2023    Procedure: RELEASE, CARPAL TUNNEL;  Surgeon: Noel Almonte MD;  Location: [facility] OR;  Service: Orthopedics;  Laterality: Right;    CARPAL TUNNEL RELEASE Left 7/18/2024    Procedure: RELEASE, CARPAL TUNNEL;  Surgeon: Noel Almonte MD;  Location: [facility] OR;  Service: Orthopedics;  Laterality: Left;    COLONOSCOPY N/A 04/05/2018    Procedure: COLONOSCOPY;  Surgeon: Chava Ronquillo MD;  Location: [facility] ENDO;  Service: Endoscopy;  Laterality: N/A;    COLONOSCOPY N/A 05/02/2022    Procedure: COLONOSCOPY;  Surgeon: Alix Puente MD;  Location: [facility] ENDO;  Service: Endoscopy;  Laterality: N/A;    COLONOSCOPY N/A 06/07/2023    Procedure: COLONOSCOPY - rule out CMV  Cardiac clearance/Eliquis hold approval received on 05/21/23 per Dr. Meade, cardiology.  Note in encounters.  LB;  Surgeon: Daniella Shah MD;  Location: [facility];  Service: Endoscopy;  Laterality: N/A;    ESOPHAGOGASTRODUODENOSCOPY N/A 03/26/2024    Procedure: EGD (ESOPHAGOGASTRODUODENOSCOPY) 3/1-pt cleared, ok to hold eliquis for 3 days;  Surgeon: Daniella Shah MD;  Location: [facility];  Service: Endoscopy;  Laterality: N/A;    KIDNEY TRANSPLANT  2015    RETINAL LASER PROCEDURE       Family History   Problem Relation Name Age of Onset    Diabetes Mother      Hypertension Mother      Heart failure Mother      Heart failure Father      Kidney disease Sister          ESRD    Diabetes Sister      Diabetes Maternal Grandmother       Cancer Neg Hx       Social History     Socioeconomic History    Marital status:     Number of children: 2   Occupational History    Occupation: retired     Employer: Retired   Tobacco Use    Smoking status: Former     Current packs/day: 0.00     Types: Cigarettes     Quit date: 2013     Years since quittin.4     Passive exposure: Past    Smokeless tobacco: Former     Quit date: 2013    Tobacco comments:     used marijuana since [id number], stopped after started dialysis   Substance and Sexual Activity    Alcohol use: No     Alcohol/week: 0.0 standard drinks of alcohol    Drug use: Not Currently     Comment:      Sexual activity: Never   Social History Narrative    . Lives with spouse. Has 2 children. Patient retired as  for [employer]. He has been washing cars.     Social Drivers of Health     Financial Resource Strain: Low Risk  (10/2/2020)    Overall Financial Resource Strain (CARDIA)     Difficulty of Paying Living Expenses: Not hard at all   Transportation Needs: No Transportation Needs (10/2/2020)    PRAPARE - Transportation     Lack of Transportation (Medical): No     Lack of Transportation (Non-Medical): No   Stress: No Stress Concern Present (10/2/2020)    [employer] - Occupational Stress Questionnaire     Feeling of Stress : Not at all     Medication List with Changes/Refills   Current Medications    ALBUTEROL (PROVENTIL) 2.5 MG /3 ML (0.083 %) NEBULIZER SOLUTION    Take 3 mLs (2.5 mg total) by nebulization every 4 to 6 hours as needed for Wheezing or Shortness of Breath.    ALBUTEROL (PROVENTIL/VENTOLIN HFA) 90 MCG/ACTUATION INHALER    Inhale 2 puffs into the lungs every 4 (four) hours as needed for Wheezing or Shortness of Breath.    AMITRIPTYLINE (ELAVIL) 25 MG TABLET    Take 1 tablet (25 mg total) by mouth nightly as needed for Insomnia.    APIXABAN (ELIQUIS) 5 MG TAB    Take 1 tablet (5 mg total) by mouth 2 (two) times daily.  "   ASPIRIN (ECOTRIN) 81 MG EC TABLET    Take 1 tablet by mouth Daily.     BD ULTRA-FINE MINI PEN NEEDLE 31 GAUGE X 3/16" NDLE    Use to inject insulin as needed up to 4 times daily    BLOOD SUGAR DIAGNOSTIC (CONTOUR NEXT TEST STRIPS) STRP    Test blood sugar 4 (four) times daily before meals and nightly.    BLOOD-GLUCOSE METER (FREESTYLE LITE METER) KIT    1 each 4 (four) times daily.    BLOOD-GLUCOSE SENSOR (DEXCOM G7 SENSOR) VIC    Use as directed for continuous glucose monitoring; Change every 10 days.    CALCITRIOL (ROCALTROL) 0.25 MCG CAP    Take 1 capsule (0.25 mcg total) by mouth once daily.    FAMOTIDINE (PEPCID) 20 MG TABLET    TAKE ONE TABLET BY MOUTH EVERY EVENING    FERROUS SULFATE (FEOSOL) 325 MG (65 MG IRON) TAB TABLET    Take 1 tablet (325 mg total) by mouth daily with breakfast.    FISH OIL-OMEGA-3 FATTY ACIDS 300-1,000 MG CAPSULE    Take 1 g by mouth once daily.    FUROSEMIDE (LASIX) 80 MG TABLET    Take one-half tablet (40 mg) by mouth 2 (two) times daily.    INSULIN GLARGINE U-100, LANTUS, (LANTUS SOLOSTAR U-100 INSULIN) 100 UNIT/ML (3 ML) INPN PEN    Inject 25 Units into the skin 2 (two) times daily.    KETOCONAZOLE (NIZORAL) 200 MG TAB    Take ½ tablet (100 mg total) by mouth once daily.    LANCETS (MICROLET LANCET) MISC    100 lancets by Misc.(Non-Drug; Combo Route) route 4 (four) times daily before meals and nightly.    MAGNESIUM OXIDE (MAG-OX) 400 MG (241.3 MG MAGNESIUM) TABLET    Take 1 tablet (400 mg total) by mouth once daily.    METOPROLOL SUCCINATE (TOPROL-XL) 25 MG 24 HR TABLET    Take 1 tablet (25 mg total) by mouth once daily.    MULTIVITAMIN (THERAGRAN) TABLET    Take 1 tablet by mouth once daily.    MYCOPHENOLATE (CELLCEPT) 250 MG CAP    Take 2 capsules (500 mg total) by mouth 2 (two) times daily.    NOVOLOG FLEXPEN U-100 INSULIN 100 UNIT/ML (3 ML) INPN PEN    Inject 25 Units into the skin 3 (three) times daily with meals.    ONDANSETRON (ZOFRAN) 4 MG TABLET    Take 1 tablet (4 mg " total) by mouth every 6 (six) hours.    POTASSIUM CHLORIDE SA (K-DUR,KLOR-CON) 20 MEQ TABLET    Take 2 tablets (40 mEq total) by mouth once daily.    PREDNISONE (DELTASONE) 5 MG TABLET    Take 1 tablet (5 mg total) by mouth once daily.    ROSUVASTATIN (CRESTOR) 40 MG TAB    Take 1 tablet (40 mg total) by mouth once daily.    SACUBITRIL-VALSARTAN (ENTRESTO)  MG PER TABLET    Take 1 tablet by mouth 2 (two) times daily.    SEMAGLUTIDE (OZEMPIC) 2 MG/DOSE (8 MG/3 ML) PNIJ    Inject 2 mg into the skin every 7 days.    TACROLIMUS (PROGRAF) 1 MG CAP    Take 5 capsules (5 mg total) by mouth every 12 (twelve) hours.    TRIAMCINOLONE ACETONIDE 0.1% (KENALOG) 0.1 % OINTMENT    Apply topically 2 (two) times daily. Use on bilateral lower legs.   Changed and/or Refilled Medications    Modified Medication Previous Medication    PREDNISONE (DELTASONE) 20 MG TABLET predniSONE (DELTASONE) 20 MG tablet       Take 2 tablets (40 mg total) by mouth once daily. for 5 days    Prednisone  (3 tablets) 60 mg/ day for 3 days, (2 tablets) 40 mg/day for 3 days, (1 tablet) 20 mg/ day for 3 days, (1/2 tablet )10 mg a day for 3 days.Resume 5 mg prednisone after finishing     Review of patient's allergies indicates:   Allergen Reactions    Lisinopril Other (See Comments)     Other reaction(s):  cough    Actos  [pioglitazone] Other (See Comments)     Other reaction(s): CHF    Metformin Other (See Comments)     Other reaction(s): renal insuff  Other reaction(s): CHF       Physical Exam:   Body mass index is 45.05 kg/m².    Physical Exam  Musculoskeletal:      Right wrist: Effusion present.       Detailed MSK exam:               Right Hand/Wrist Exam     Inspection   Effusion: Wrist - present Hand -  present    Pain   Hand - The patient exhibits pain of the extensory musculature.    Swelling   Hand - The patient is swollen on the extensory musculature.    Tenderness   The patient is tender to palpation of the dorsal area and ulnar area.    Range  of Motion     Wrist   Extension:  abnormal   Flexion:  abnormal   Pronation:  abnormal   Supination:  abnormal     Comments:  Diffuse swelling of the dorsal ulnar aspect of the right hand and wrist.  Significant, diffuse tenderness to palpation throughout the dorsum of the hand.          Muscle Strength   Right Upper Extremity   Wrist extension: 4/5   Wrist flexion: 4/5   : 4/5   Index Finger: 4/5  Middle Finger: 4/5  Ring Finger: 4/5  Little Finger: 4/5  Pinch Mechanism: 4/5       Imaging:  XR right hand - 11/10/2024    Relevant imaging results were reviewed and interpreted by me and per my read:  Stable degenerative changes about the right hand and wrist.  No evidence of fracture or other acute abnormality.    This was discussed with the patient and / or family today.     Patient Instructions   Assessment:  Mitch Whittaker is a 68 y.o. male   Chief Complaint   Patient presents with    Right Hand - Pain, Swelling       Encounter Diagnoses   Name Primary?    Pain and swelling of right wrist Yes    Right hand weakness     Right hand pain         Plan:  X-rays reviewed - chronic degenerative findings of the right hand and wrist, without any evidence of acute abnormality.    Labs reviewed - A1c 7.4%, Cr 2.2, GFR 31, LFTs WNL   History and clinical exam is concerning for right hand and wrist pain and weakness, possible tendinous injury, after lifting heavy bottle about 3 weeks ago.  There are significant pain, diffuse swelling around the dorsum of the wrist, and significant weakness including  strength, flexion, extension, and intrinsic muscle weakness.  We will place in a immobilizing wrist brace today.    At least 15 minutes were spent sizing, fitting, and educating regarding durable medical equipment today by clinical assistant under direction of Leonardo Potter MD.  We will prescribe for prednisone 40 mg daily for 5 days.  This will affect her blood sugar.  I am going to send a communication your PCP, so  insulin can be adjusted accordingly.  We will refer you back to Dr. Almonte for evaluation later this week, once you been immobilized and taken the steroids, as advanced imaging may be warranted.    Follow-up:  11/15 with Dr. Almonte.    Thank you for choosing [facility] and Dr. Arturo Garza for your orthopedic & sports medicine care. It is our goal to provide you with exceptional care that will help keep you healthy, active, and get you back in the game.    Please do not hesitate to reach out to us via email, phone, or MyChart with any questions, concerns, or feedback.    If you felt that you received exemplary care today, please consider leaving us feedback on [employer] at:  [email or web address]    If you are experiencing pain/discomfort ,or have questions after 5pm and would like to be connected to the [facility] on-call team, please call this number and specify which Sports Medicine provider is treating you: [phone]       A copy of today's visit note has been sent to the referring provider.           Leonardo Potter MD  Primary Care Sports Medicine    Disclaimer: This note was prepared using a voice recognition system and is likely to have sound alike errors within the text.

## 2024-11-10 NOTE — PATIENT INSTRUCTIONS
Assessment:  Mitch Whittaker is a 68 y.o. male   Chief Complaint   Patient presents with    Right Hand - Pain, Swelling       Encounter Diagnoses   Name Primary?    Pain and swelling of right wrist Yes    Right hand weakness     Right hand pain         Plan:  X-rays reviewed - chronic degenerative findings of the right hand and wrist, without any evidence of acute abnormality.    Labs reviewed - A1c 7.4%, Cr 2.2, GFR 31, LFTs WNL   History and clinical exam is concerning for right hand and wrist pain and weakness, possible tendinous injury, after lifting heavy bottle about 3 weeks ago.  There are significant pain, diffuse swelling around the dorsum of the wrist, and significant weakness including  strength, flexion, extension, and intrinsic muscle weakness.  We will place in a immobilizing wrist brace today.    At least 15 minutes were spent sizing, fitting, and educating regarding durable medical equipment today by clinical assistant under direction of Leonardo Potter MD.  We will prescribe for prednisone 40 mg daily for 5 days.  This will affect her blood sugar.  I am going to send a communication your PCP, so insulin can be adjusted accordingly.  We will refer you back to Dr. Almonte for evaluation later this week, once you been immobilized and taken the steroids, as advanced imaging may be warranted.    Follow-up:  11/15 with Dr. Almonte.    Thank you for choosing [facility] and Dr. Arturo Garza for your orthopedic & sports medicine care. It is our goal to provide you with exceptional care that will help keep you healthy, active, and get you back in the game.    Please do not hesitate to reach out to us via email, phone, or MyChart with any questions, concerns, or feedback.    If you felt that you received exemplary care today, please consider leaving us feedback on [employer] at:  [email or web address]    If you are experiencing  pain/discomfort ,or have questions after 5pm and would like to be connected to the [facility] on-call team, please call this number and specify which Sports Medicine provider is treating you: [phone]

## 2024-11-12 ENCOUNTER — OFFICE VISIT (OUTPATIENT)
Dept: CARDIOLOGY | Facility: CLINIC | Age: 69
End: 2024-11-12
Payer: COMMERCIAL

## 2024-11-12 ENCOUNTER — CLINICAL SUPPORT (OUTPATIENT)
Dept: DIABETES | Facility: CLINIC | Age: 69
End: 2024-11-12
Payer: COMMERCIAL

## 2024-11-12 VITALS
WEIGHT: 275.81 LBS | OXYGEN SATURATION: 96 % | SYSTOLIC BLOOD PRESSURE: 142 MMHG | BODY MASS INDEX: 44.33 KG/M2 | HEART RATE: 104 BPM | HEIGHT: 66 IN | DIASTOLIC BLOOD PRESSURE: 84 MMHG

## 2024-11-12 DIAGNOSIS — I49.3 PVC'S (PREMATURE VENTRICULAR CONTRACTIONS): ICD-10-CM

## 2024-11-12 DIAGNOSIS — G47.33 OSA (OBSTRUCTIVE SLEEP APNEA): ICD-10-CM

## 2024-11-12 DIAGNOSIS — E11.21 TYPE 2 DIABETES MELLITUS WITH DIABETIC NEPHROPATHY, UNSPECIFIED WHETHER LONG TERM INSULIN USE: Primary | ICD-10-CM

## 2024-11-12 DIAGNOSIS — Z94.0 KIDNEY TRANSPLANT STATUS, LIVING RELATED DONOR: ICD-10-CM

## 2024-11-12 DIAGNOSIS — Z79.01 CHRONIC ANTICOAGULATION: ICD-10-CM

## 2024-11-12 DIAGNOSIS — Z86.718 HISTORY OF DVT (DEEP VEIN THROMBOSIS): ICD-10-CM

## 2024-11-12 DIAGNOSIS — E11.22 HYPERTENSION ASSOCIATED WITH STAGE 3 CHRONIC KIDNEY DISEASE DUE TO TYPE 2 DIABETES MELLITUS: ICD-10-CM

## 2024-11-12 DIAGNOSIS — Z86.16 HISTORY OF COVID-19: ICD-10-CM

## 2024-11-12 DIAGNOSIS — E11.69 TYPE 2 DIABETES MELLITUS WITH OTHER SPECIFIED COMPLICATION, WITH LONG-TERM CURRENT USE OF INSULIN: ICD-10-CM

## 2024-11-12 DIAGNOSIS — I12.9 HYPERTENSION ASSOCIATED WITH STAGE 3 CHRONIC KIDNEY DISEASE DUE TO TYPE 2 DIABETES MELLITUS: ICD-10-CM

## 2024-11-12 DIAGNOSIS — I87.2 CHRONIC VENOUS INSUFFICIENCY OF LOWER EXTREMITY: ICD-10-CM

## 2024-11-12 DIAGNOSIS — N18.30 HYPERTENSION ASSOCIATED WITH STAGE 3 CHRONIC KIDNEY DISEASE DUE TO TYPE 2 DIABETES MELLITUS: ICD-10-CM

## 2024-11-12 DIAGNOSIS — E66.01 MORBID OBESITY WITH BMI OF 40.0-44.9, ADULT: Primary | ICD-10-CM

## 2024-11-12 DIAGNOSIS — I82.592 CHRONIC DEEP VEIN THROMBOSIS (DVT) OF OTHER VEIN OF LEFT LOWER EXTREMITY: ICD-10-CM

## 2024-11-12 DIAGNOSIS — E66.813 CLASS 3 SEVERE OBESITY DUE TO EXCESS CALORIES WITH SERIOUS COMORBIDITY AND BODY MASS INDEX (BMI) OF 40.0 TO 44.9 IN ADULT: ICD-10-CM

## 2024-11-12 DIAGNOSIS — I44.7 LBBB (LEFT BUNDLE BRANCH BLOCK): ICD-10-CM

## 2024-11-12 DIAGNOSIS — E66.01 CLASS 3 SEVERE OBESITY DUE TO EXCESS CALORIES WITH SERIOUS COMORBIDITY AND BODY MASS INDEX (BMI) OF 40.0 TO 44.9 IN ADULT: ICD-10-CM

## 2024-11-12 DIAGNOSIS — G47.33 OBSTRUCTIVE SLEEP APNEA: ICD-10-CM

## 2024-11-12 DIAGNOSIS — I50.33 ACUTE ON CHRONIC DIASTOLIC CONGESTIVE HEART FAILURE: ICD-10-CM

## 2024-11-12 DIAGNOSIS — N18.30 STAGE 3 CHRONIC KIDNEY DISEASE, UNSPECIFIED WHETHER STAGE 3A OR 3B CKD: ICD-10-CM

## 2024-11-12 DIAGNOSIS — I25.118 CORONARY ARTERY DISEASE OF NATIVE ARTERY OF NATIVE HEART WITH STABLE ANGINA PECTORIS: ICD-10-CM

## 2024-11-12 DIAGNOSIS — I50.42 CHRONIC COMBINED SYSTOLIC AND DIASTOLIC CONGESTIVE HEART FAILURE: ICD-10-CM

## 2024-11-12 DIAGNOSIS — E66.01 SEVERE OBESITY (BMI >= 40): ICD-10-CM

## 2024-11-12 DIAGNOSIS — N18.32 STAGE 3B CHRONIC KIDNEY DISEASE: ICD-10-CM

## 2024-11-12 DIAGNOSIS — Z79.4 TYPE 2 DIABETES MELLITUS WITH OTHER SPECIFIED COMPLICATION, WITH LONG-TERM CURRENT USE OF INSULIN: ICD-10-CM

## 2024-11-12 PROCEDURE — G0108 DIAB MANAGE TRN  PER INDIV: HCPCS | Mod: S$GLB,,, | Performed by: DIETITIAN, REGISTERED

## 2024-11-12 PROCEDURE — 99999 PR PBB SHADOW E&M-EST. PATIENT-LVL IV: CPT | Mod: PBBFAC,,, | Performed by: DIETITIAN, REGISTERED

## 2024-11-12 PROCEDURE — 99999 PR PBB SHADOW E&M-EST. PATIENT-LVL V: CPT | Mod: PBBFAC,,, | Performed by: INTERNAL MEDICINE

## 2024-11-12 NOTE — PROGRESS NOTES
Diabetes Care Specialist Follow-up Note  Author: Kathie Arenas RD, CDE  Date: 11/12/2024    Intake    Program Intake  Reason for Diabetes Program Visit:: Intervention  Type of Intervention:: Individual  Individual: Education, Device Training  Education: Self-Management Skill Review  Device Training: Personal CGM (Dexcom f/u)  Current diabetes risk level:: high  In the last month, have you used the ER or been admitted to the hospital: No  Permission to speak with others about care:: no    Current Diabetes Treatment: Insulin, DM Injectables  DM Injectables Type/Dose: Ozempic 2mg q7d  Method of insulin delivery?: Injections  Injection Type: Pens  Pen Type/Dose: Novolog 20u with meals + SS // Lantus 30u BID    Continuous Glucose Monitoring  Patient has CGM: Yes  Personal CGM type:: Dexcom G7  GMI Date: 11/12/24  GMI Value: 6.4 %    Lab Results   Component Value Date    HGBA1C 7.4 (H) 06/18/2024       SMBG: Dexcom G7            Diabetes Self-Management Skills Assessment    Medication Skills Assessment  Patient is able to identify current diabetes medications, dosages, and appropriate timing of medications.: yes  Patient reports problems or concerns with current medication regimen.: no  Patient is  aware that some diabetes medications can cause low blood sugar?: Yes  Medication Skills Assessment Completed:: Yes  Assessment indicates:: Adequate understanding  Area of need?: No    Nutrition/Healthy Eating  Meal Plan 24 Hour Recall - Breakfast: sausage, egg, grits  Meal Plan 24 Hour Recall - Lunch: 3 finger sandwiches  Meal Plan 24 Hour Recall - Dinner: 4 finger sandwiches  Meal Plan 24 Hour Recall - Snack: ate small piece honeybun to bring up blood sugar  Meal Plan 24 Hour Recall - Beverage: water, coffee with creamer and teaspoon of sugar, water  Who shops/cooks?: daughter  Patient can identify foods that impact blood sugar.: yes  Nutrition/Healthy Eating Skills Assessment Completed:: Yes  Assessment indicates:: Adequate  understanding  Area of need?: No      Home Blood Glucose Monitoring  Patient states that blood sugar is checked at home daily.: yes  Monitoring Method:: personal continuous glucose monitor  Personal CGM type:: Dexcom G7  Home Blood Glucose Monitoring Skills Assessment Completed: : Yes  Assessment indicates:: Adequate understanding  Area of need?: No    During today's follow-up visit,  the following areas required further assessment and content was provided/reviewed.    Based on today's diabetes care assessment, the following areas of need were identified:      Identified Areas of Need      Medication/Current Diabetes Treatment: Provided patient with insulin dose adjustment (from Sofía Geller) while taking round of steroids.    Nutrition/Healthy Eating: See care plan update.    Physical Activity/Exercise:      Home Blood Glucose Monitoring: See care plan update. Patient met goal.    Acute Complications:  Reviewed proper treatment of hypoglycemia.    Chronic Complications:         Today's interventions were provided through individual discussion, instruction, and written materials were provided.    Patient verbalized understanding of instruction and written materials.  Pt was able to return back demonstration of instructions today. Patient understood key points, needs reinforcement and further instruction.     Diabetes Self-Management Care Plan Review and Evaluation of Progress:    During today's follow-up Mitch's Diabetes Self-Management Care Plan progress was reviewed and progress was evaluated including his/her input. Mitch has agreed to continue his/her journey to improve/maintain overall diabetes control by continuing to set health goals. See care plan progress below.      Care Plan: Diabetes Management   Updates made since 11/13/2023 12:00 AM        Problem: Blood Glucose Self-Monitoring         Goal: Patient will use Dexcom G7 to monitor BG and make treatment decisions. Completed 11/12/2024   Start Date:  "10/11/2024   Expected End Date: 11/11/2024   This Visit's Progress: Met   Priority: High   Barriers: No Barriers Identified   Note:    11/12/24: daughter is assisting patient with changing sensors every 10 days. Patient is using system appropriately. Reviewed Dexcom Clarity report with patient. Current TIR is 89%. Was experiencing hypoglycemia post lunch but insulin doses adjusted. Temporary adjustment to insulin doses made by Provider while patient is taking steroids. Reviewed treatment for hypoglycemia.        Task: Trained patient to use Dexcom G7. Completed 10/11/2024   Note:    DEXCOM Best Five Reviewed7 CGM TRAINING  Dexcom G7 mobile apps downloaded to phone. Education was provided using "Quick Start Guide" and demo device per Dexcom protocol. Clarity clinic data share set-up.        Overview:  5 minute glucose reading updates, trending arrows, BG graph screens, reports screen,  connectivity and functions.   Menus: Trend graph, start sensor, enter BG, events, alerts, settings, replace sensor, stop sensor, and shutdown  Dexcom GBaiyaxuan mobile pérez/ Settings:                          * Urgent Low: 55 mg/dL                          * Urgent Low Soon: On                          * Low Alert: 70 mg/dL; Snooze OFF                          * High Alert: 250 mg/dL; Snooze OFF                           * Signal Loss: ON                          * Brief Sensor Issue: ON     Reviewed additional setting options.  Patient paired sensor using 4-digit code listed on sensor cap..    Reviewed where to find sensor insertion time and expiration date.   Reviewed appropriate calibration techniques.  Reviewed sensor site selection. Patient selected and prepped site using aseptic technique, inserted sensor, applied overlay tape and started session.   Reviewed sensor removal from site. Discussed times to remove sensor per  guidelines include MRI or diatherapy.   Patient able to demonstrate without difficulty. Encouraged to review " manual and/or training videos prior to starting another sensor.   Reviewed problem solving aspects of sensor transmission/variables that can disrupt RF transmission.  range 20 feet, but the first 3 hrs keep within 3 feet of transmitter.  Patient instructed on lag time of interstitial fluid from CBG and was advised to treat hypoglycemia and dose insulin based on SMBG values.  Dexcom technical support contact number given and examples of when to contact them discussed.            Problem: Healthy Eating         Goal: Eat 3 meals daily with <60g/ up to 4 servings of Carbohydrate per meal.    Start Date: 10/11/2024   Expected End Date: 4/11/2025   This Visit's Progress: On track   Priority: Medium   Barriers: No Barriers Identified   Note:    Patient is practicing portion control. Usually eats 1/2 of plate lunch at lunch and the rest at dinner.  Daughter is supportive. Cooks and shops for patient and wife.   11/12/24: Patient is eating 3 meals per day, lunch meal is the smallest meal. Reports eating <60g of carbohydrates per meal. Discussed strategies for eating healthy during the holidays.        Task: Reviewed the sources and role of Carbohydrate, Protein, and Fat and how each nutrient impacts blood sugar. Completed 10/11/2024        Task: Provided visual examples using dry measuring cups, food models, and other familiar objects such as computer mouse, deck or cards, tennis ball etc. to help with visualization of portions. Completed 10/11/2024        Task: Review the importance of balancing carbohydrates with each meal using portion control techniques to count servings of carbohydrate and label reading to identify serving size and amount of total carbs per serving. Completed 10/11/2024        Task: Provided Sample plate method and reviewed the use of the plate to estimate amounts of carbohydrate per meal. Completed 10/11/2024          Follow Up Plan     Follow up in about 2 months (around 1/12/2025) for  E11.21.    Today's care plan and follow up schedule was discussed with patient.  Mitch verbalized understanding of the care plan, goals, and agrees to follow up plan.        The patient was encouraged to communicate with his/her health care provider/physician and care team regarding his/her condition(s) and treatment.  I provided the patient with my contact information today and encouraged to contact me via phone or [facility] as needed.     Length of Visit   Total Time: 30 Minutes

## 2024-11-12 NOTE — PATIENT INSTRUCTIONS
Changes to Insulin doses from Sofía Geller while on steroids:      Change Lantus to 25 units am and 10 units pm and   Novolog to 12-2-8 ac meals plus sliding scale.     Resume normal doses when done with steroids

## 2024-11-12 NOTE — PROGRESS NOTES
Subjective:   Patient ID:  Mitch Whittaker is a 68 y.o. male who presents for cardiac consult of No chief complaint on file.      The patient came in today for cardiac consult of No chief complaint on file.      Mitch Whittaker is a 68 y.o. male with current medical conditions non obs CAD, h/o COVID 19, DVT on Eliquis, Obesity,  HFrEF 45-50%, CKD, DM2, MARION, HTN, HLD, ESRD s/p renal transplant presents for follow up CV eval.     2/22/24  Started Ozempic last visit. BMI 44 - 277 lbs.   He has not lost much weight yet. He had low BP once and improved with fluid/chips.   He has low iron will f/u hemeonc.   ECG - sinus tach , V 101, st TWI - inferolat old      5/2/24  PT will need carpal tunnel surgery with Dr. Almonte.   He is s/p EGD for anemia workup,  A1c is improving.   BP and HR stable. BMI 45 - 280 lbs   He has occ worsening LE edema - told to increase lasix PRN and elevate legs.   He occ eats out.     8/22/24  BP elevated mildly. HR 90s. BMI 44 - 275 lbs     11/12/24  BP elevated 140/80s. BMI 44 - 275 lbs   His Blood sugar was lower earlier.   He had more edema - taking lasix 40mg BID    Conclusion 10/2023         Predominant Rhythm Sinus rhythm Heart rates varied between 55 and 145 BPM with an average of 93 BPM.    There were occasional PVCs totalling 661 and averaging 13.77 per hour. (0.3%)    There were frequent PACs totalling 4131 and averaging 86.06 per hour. (1.6%)    On review, no evidence of afib observed on the recorded rhythm strips.    Results for orders placed during the hospital encounter of 08/22/23    Echo    Interpretation Summary    Left Ventricle: The left ventricle is normal in size. Moderately increased ventricular mass. Moderately increased wall thickness. Normal wall motion. There is low normal systolic function with a visually estimated ejection fraction of 50 - 55%. There is normal diastolic function.    Left Atrium: Left atrium is severely dilated.    Right Ventricle: Normal right  ventricular cavity size. Wall thickness is normal. Right ventricle wall motion  is normal. Systolic function is normal.    Right Atrium: Right atrium is dilated.    IVC/SVC: Normal venous pressure at 3 mmHg.      Results for orders placed during the hospital encounter of 10/10/23    Nuclear Stress - Cardiology Interpreted    Interpretation Summary    Normal myocardial perfusion scan. There is no evidence of myocardial ischemia or infarction.    There is a mild intensity perfusion abnormality in the anteroapical wall of the left ventricle, secondary to breast attenuation.    The gated perfusion images showed an ejection fraction of 41% at rest. The gated perfusion images showed an ejection fraction of 54% post stress.    The ECG portion of the study is uninterpretable due to left bundle branch block.    The patient reported no chest pain during the stress test.    Pt experienced abdominal discomfort post infusion. No chest pain pre, during or post stress test      LE US 12/2019  Age Indeterminate non occlusive thrombus of the Posterior Tibial Vein.  CONCLUSIONS   Technically difficult study.   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 RNA NEGATIVE 5/2015, 2021, 2022    History of colon polyps     HPTH (hyperparathyroidism)     Hyperlipidemia     Hypertension associated with stage 3 chronic kidney disease due to type 2 diabetes mellitus     LBBB (left bundle branch block) 12/20/2021    Morbid obesity with BMI of 45.0-49.9, adult     Nephrolithiasis 6/7/2013    PCO (posterior capsular opacification), left 03/04/2019    Proteinuria     resolved s/p transplant    S/P kidney transplant     Sleep apnea     Type 2 diabetes, uncontrolled, with retinopathy     Type II diabetes mellitus with renal manifestations        Past Surgical History:   Procedure Laterality Date    CARDIAC CATHETERIZATION  01/01/2008    normal coronary    CARPAL TUNNEL RELEASE Right 12/01/2023    Procedure: RELEASE, CARPAL TUNNEL;  Surgeon: Noel Almonte MD;  Location: [facility] OR;  Service: Orthopedics;  Laterality: Right;    CARPAL TUNNEL RELEASE Left 7/18/2024    Procedure: RELEASE, CARPAL TUNNEL;  Surgeon: Noel Almonte MD;  Location: [facility] OR;  Service: Orthopedics;  Laterality: Left;    COLONOSCOPY N/A 04/05/2018    Procedure: COLONOSCOPY;  Surgeon: Chava Ronquillo MD;  Location: [facility];  Service: Endoscopy;  Laterality: N/A;    COLONOSCOPY N/A 05/02/2022    Procedure: COLONOSCOPY;  Surgeon: Alix Puente MD;  Location: [facility];  Service: Endoscopy;  Laterality: N/A;    COLONOSCOPY N/A 06/07/2023    Procedure: COLONOSCOPY - rule out CMV  Cardiac clearance/Eliquis hold approval received on 05/21/23 per Dr. Meade, cardiology.  Note in encounters.  LB;  Surgeon: Daniella Shah MD;  Location: [facility];  Service: Endoscopy;  Laterality: N/A;    ESOPHAGOGASTRODUODENOSCOPY N/A 03/26/2024    Procedure: EGD (ESOPHAGOGASTRODUODENOSCOPY) 3/1-pt cleared, ok to hold eliquis for 3 days;  Surgeon: Daniella Shah MD;  Location: [facility];  Service: Endoscopy;  Laterality: N/A;    KIDNEY TRANSPLANT  2015    RETINAL  "LASER PROCEDURE         Social History     Tobacco Use    Smoking status: Former     Current packs/day: 0.00     Types: Cigarettes     Quit date: 2013     Years since quittin.4     Passive exposure: Past    Smokeless tobacco: Former     Quit date: 2013    Tobacco comments:     used marijuana since [id number], stopped after started dialysis   Substance Use Topics    Alcohol use: No     Alcohol/week: 0.0 standard drinks of alcohol    Drug use: Not Currently     Comment:         Family History   Problem Relation Name Age of Onset    Diabetes Mother      Hypertension Mother      Heart failure Mother      Heart failure Father      Kidney disease Sister          ESRD    Diabetes Sister      Diabetes Maternal Grandmother      Cancer Neg Hx         Patient's Medications   New Prescriptions    No medications on file   Previous Medications    ALBUTEROL (PROVENTIL) 2.5 MG /3 ML (0.083 %) NEBULIZER SOLUTION    Take 3 mLs (2.5 mg total) by nebulization every 4 to 6 hours as needed for Wheezing or Shortness of Breath.    ALBUTEROL (PROVENTIL/VENTOLIN HFA) 90 MCG/ACTUATION INHALER    Inhale 2 puffs into the lungs every 4 (four) hours as needed for Wheezing or Shortness of Breath.    AMITRIPTYLINE (ELAVIL) 25 MG TABLET    Take 1 tablet (25 mg total) by mouth nightly as needed for Insomnia.    APIXABAN (ELIQUIS) 5 MG TAB    Take 1 tablet (5 mg total) by mouth 2 (two) times daily.    ASPIRIN (ECOTRIN) 81 MG EC TABLET    Take 1 tablet by mouth Daily.     BD ULTRA-FINE MINI PEN NEEDLE 31 GAUGE X 3/16" NDLE    Use to inject insulin as needed up to 4 times daily    BLOOD SUGAR DIAGNOSTIC (CONTOUR NEXT TEST STRIPS) STRP    Test blood sugar 4 (four) times daily before meals and nightly.    BLOOD-GLUCOSE METER (FREESTYLE LITE METER) KIT    1 each 4 (four) times daily.    BLOOD-GLUCOSE SENSOR (DEXCOM G7 SENSOR) VIC    Use as directed for continuous glucose monitoring; Change every 10 days.    CALCITRIOL (ROCALTROL) 0.25 MCG " CAP    Take 1 capsule (0.25 mcg total) by mouth once daily.    FAMOTIDINE (PEPCID) 20 MG TABLET    TAKE ONE TABLET BY MOUTH EVERY EVENING    FERROUS SULFATE (FEOSOL) 325 MG (65 MG IRON) TAB TABLET    Take 1 tablet (325 mg total) by mouth daily with breakfast.    FISH OIL-OMEGA-3 FATTY ACIDS 300-1,000 MG CAPSULE    Take 1 g by mouth once daily.    FUROSEMIDE (LASIX) 80 MG TABLET    Take one-half tablet (40 mg) by mouth 2 (two) times daily.    INSULIN GLARGINE U-100, LANTUS, (LANTUS SOLOSTAR U-100 INSULIN) 100 UNIT/ML (3 ML) INPN PEN    Inject 25 Units into the skin 2 (two) times daily.    KETOCONAZOLE (NIZORAL) 200 MG TAB    Take ½ tablet (100 mg total) by mouth once daily.    LANCETS (MICROLET LANCET) MISC    100 lancets by Misc.(Non-Drug; Combo Route) route 4 (four) times daily before meals and nightly.    MAGNESIUM OXIDE (MAG-OX) 400 MG (241.3 MG MAGNESIUM) TABLET    Take 1 tablet (400 mg total) by mouth once daily.    METOPROLOL SUCCINATE (TOPROL-XL) 25 MG 24 HR TABLET    Take 1 tablet (25 mg total) by mouth once daily.    MULTIVITAMIN (THERAGRAN) TABLET    Take 1 tablet by mouth once daily.    MYCOPHENOLATE (CELLCEPT) 250 MG CAP    Take 2 capsules (500 mg total) by mouth 2 (two) times daily.    NOVOLOG FLEXPEN U-100 INSULIN 100 UNIT/ML (3 ML) INPN PEN    Inject 25 Units into the skin 3 (three) times daily with meals.    ONDANSETRON (ZOFRAN) 4 MG TABLET    Take 1 tablet (4 mg total) by mouth every 6 (six) hours.    POTASSIUM CHLORIDE SA (K-DUR,KLOR-CON) 20 MEQ TABLET    Take 2 tablets (40 mEq total) by mouth once daily.    PREDNISONE (DELTASONE) 20 MG TABLET    Take 2 tablets (40 mg total) by mouth once daily. for 5 days    PREDNISONE (DELTASONE) 5 MG TABLET    Take 1 tablet (5 mg total) by mouth once daily.    ROSUVASTATIN (CRESTOR) 40 MG TAB    Take 1 tablet (40 mg total) by mouth once daily.    SACUBITRIL-VALSARTAN (ENTRESTO)  MG PER TABLET    Take 1 tablet by mouth 2 (two) times daily.    SEMAGLUTIDE  "(OZEMPIC) 2 MG/DOSE (8 MG/3 ML) PNIJ    Inject 2 mg into the skin every 7 days.    TACROLIMUS (PROGRAF) 1 MG CAP    Take 5 capsules (5 mg total) by mouth every 12 (twelve) hours.    TRIAMCINOLONE ACETONIDE 0.1% (KENALOG) 0.1 % OINTMENT    Apply topically 2 (two) times daily. Use on bilateral lower legs.   Modified Medications    No medications on file   Discontinued Medications    No medications on file       Review of Systems   Constitutional:  Positive for malaise/fatigue.   HENT: Negative.     Eyes: Negative.    Respiratory:  Positive for cough and shortness of breath.    Cardiovascular:  Positive for leg swelling. Negative for chest pain and palpitations.   Gastrointestinal: Negative.    Genitourinary: Negative.    Musculoskeletal:  Positive for back pain.   Skin: Negative.    Neurological:  Positive for dizziness.   Endo/Heme/Allergies: Negative.    Psychiatric/Behavioral: Negative.     All 12 systems otherwise negative.      Wt Readings from Last 3 Encounters:   11/12/24 125.1 kg (275 lb 12.7 oz)   11/10/24 126.6 kg (279 lb 1.6 oz)   11/08/24 126.6 kg (279 lb 1.6 oz)     Temp Readings from Last 3 Encounters:   07/18/24 98.3 °F (36.8 °C) (Temporal)   06/18/24 98.4 °F (36.9 °C) (Temporal)   03/26/24 97.9 °F (36.6 °C) (Skin)     BP Readings from Last 3 Encounters:   11/12/24 (!) 142/84   11/08/24 116/64   10/04/24 (!) 109/59     Pulse Readings from Last 3 Encounters:   11/12/24 104   11/08/24 101   10/04/24 105       BP (!) 142/84 (Patient Position: Sitting)   Pulse 104   Ht 5' 6" (1.676 m)   Wt 125.1 kg (275 lb 12.7 oz)   SpO2 96%   BMI 44.51 kg/m²     Objective:   Physical Exam  Vitals and nursing note reviewed.   Constitutional:       General: He is not in acute distress.     Appearance: He is well-developed. He is obese. He is not diaphoretic.   HENT:      Head: Normocephalic and atraumatic.      Nose: Nose normal.   Eyes:      General: No scleral icterus.     Conjunctiva/sclera: Conjunctivae normal. "   Neck:      Thyroid: No thyromegaly.      Vascular: No JVD.   Cardiovascular:      Rate and Rhythm: Normal rate and regular rhythm.      Heart sounds: S1 normal and S2 normal. Murmur heard.      No friction rub. No gallop. No S3 or S4 sounds.   Pulmonary:      Effort: Pulmonary effort is normal. No respiratory distress.      Breath sounds: Normal breath sounds. No stridor. No wheezing or rales.   Chest:      Chest wall: No tenderness.   Abdominal:      General: Bowel sounds are normal. There is no distension.      Palpations: Abdomen is soft. There is no mass.      Tenderness: There is no abdominal tenderness. There is no rebound.   Genitourinary:     Comments: Deferred  Musculoskeletal:         General: No tenderness or deformity. Normal range of motion.      Cervical back: Normal range of motion and neck supple.      Right lower leg: Edema present.      Left lower leg: Edema present.   Lymphadenopathy:      Cervical: No cervical adenopathy.   Skin:     General: Skin is warm and dry.      Coloration: Skin is not pale.      Findings: No erythema or rash.   Neurological:      Mental Status: He is alert and oriented to person, place, and time.      Motor: No abnormal muscle tone.      Coordination: Coordination normal.   Psychiatric:         Behavior: Behavior normal.         Thought Content: Thought content normal.         Judgment: Judgment normal.         Lab Results   Component Value Date     09/17/2024    K 4.1 09/17/2024     09/17/2024    CO2 26 09/17/2024    BUN 34 (H) 09/17/2024    CREATININE 2.2 (H) 09/17/2024     (H) 09/17/2024    HGBA1C 7.4 (H) 06/18/2024    MG 2.1 03/03/2022    AST 21 01/29/2024    ALT 30 01/29/2024    ALBUMIN 3.2 (L) 09/17/2024    PROT 6.5 01/29/2024    BILITOT 0.4 01/29/2024    WBC 2.90 (L) 09/17/2024    HGB 11.0 (L) 09/17/2024    HCT 39.2 (L) 09/17/2024    HCT 36 06/12/2015    MCV 92 09/17/2024     (L) 09/17/2024    INR 1.0 06/18/2015    TSH 0.999 03/11/2022     CHOL 201 (H) 12/20/2023    HDL 55 12/20/2023    LDLCALC 128.4 12/20/2023    TRIG 88 12/20/2023     (H) 08/22/2023     Assessment:      1. Morbid obesity with BMI of 40.0-44.9, adult    2. Chronic combined systolic and diastolic congestive heart failure    3. PVC's (premature ventricular contractions)    4. Chronic deep vein thrombosis (DVT) of other vein of left lower extremity    5. Class 3 severe obesity due to excess calories with serious comorbidity and body mass index (BMI) of 40.0 to 44.9 in adult    6. LBBB (left bundle branch block)    7. History of DVT (deep vein thrombosis)    8. Chronic anticoagulation    9. Coronary artery disease of native artery of native heart with stable angina pectoris    10. Living-related donor kidney transplant (daughter) - 6/12/15    11. Hypertension associated with stage 3 chronic kidney disease due to type 2 diabetes mellitus    12. Obstructive sleep apnea    13. Severe obesity (BMI >= 40)    14. MARION (obstructive sleep apnea)    15. Stage 3 chronic kidney disease, unspecified whether stage 3a or 3b CKD    16. Type 2 diabetes mellitus with other specified complication, with long-term current use of insulin    17. Chronic venous insufficiency of lower extremity    18. History of COVID-19    19. Stage 3b chronic kidney disease    20. Acute on chronic diastolic congestive heart failure            Plan:    1. Chronic CHF EF 40-45% ; neg for TTR amyloidosis, occ more edema at times   - cont lasix, and extra PRN  - told to double up PRN - now on lasix 40mg daily but increase to BID PRN   - titrate Toprol and Entresto;  - cont low salt diet, fluid restriction  - Nuc stress neg for ischemia 10/2023, EF 40-45% at rest.   - ECHO 10/2023 with low normal LV function, normal RV function.   - Holter 10/2023 with occ PVCs, freq PACs, AVG HR 93 bpm, neg for Afib.   - repeat ECHO ordered     2. HTN with mild edema, PVCs, PACs - occ low BP   - Holter 10/2023 with occ PVCs, freq PACs,  AVG HR 93 bpm, neg for Afib.   - cont meds for afterload reduction   - low salt diet  - rec compression stockings  - stop hydralazine   - cont BB    3. HLD  - cont statin    4. H/o ESRD s/p Transplant with CKD4 now  - f/u with nephro, repeat labs and adjust tx per nephro    5. CAD, non obs  - cont meds  - Nuc stress neg for ischemia 10/2023, EF 40-45% at rest.     6. MARION  -had CPAP machine     7. Obesity BMI 44 - 285 lbs --> 41 - 267-->  BMI 43 - 268 lbs.  --> 275 lbs  --> 275 lbs   - cont weight loss with diet/exercise     8. H/o DVT, chronic DVT  - cont Eliquis 5 BID  - non occlusive thrombus in PTV in left but no thrombus in POP vein on left.    9. Dizziness, h/o syncope with headaches  - f/u Neuro   - likely sec to Flomax and Proscar, can discuss with urology  - monitor BP as well    10. DM2 - A1c -9.3 --> 8.0 --> 7.5 --> 6.8 --> 7.1 --> 8.1 --> 7.3  - titrate meds   - cont Ozempic low dose and titrate  - increase to 0.5 mg  --> 1 mg  --> increase to 2mg     11. H/O COVID 19  - long covid sx now  - cont supportive tx    12. FE def  - f/u hemeonc , s/p GI workup    Visit today included increased complexity associated with the care of the episodic problem CHF addressed and managing the longitudinal care of the patient due to the serious and/or complex managed problem(s) .    Thank you for allowing me to participate in this patient's care. Please do not hesitate to contact me with any questions or concerns. Consult note has been forwarded to the referral physician.     Micah Muhammad MD, [facility]  Cardiovascular Disease  [facility], [place]  [phone] (P)

## 2024-11-15 ENCOUNTER — OFFICE VISIT (OUTPATIENT)
Dept: ORTHOPEDICS | Facility: CLINIC | Age: 69
End: 2024-11-15
Payer: COMMERCIAL

## 2024-11-15 VITALS — WEIGHT: 275.81 LBS | BODY MASS INDEX: 44.33 KG/M2 | HEIGHT: 66 IN

## 2024-11-15 DIAGNOSIS — M25.431 PAIN AND SWELLING OF RIGHT WRIST: ICD-10-CM

## 2024-11-15 DIAGNOSIS — M19.031 ARTHRITIS OF RIGHT WRIST: Primary | ICD-10-CM

## 2024-11-15 DIAGNOSIS — M79.641 RIGHT HAND PAIN: ICD-10-CM

## 2024-11-15 DIAGNOSIS — M25.531 PAIN AND SWELLING OF RIGHT WRIST: ICD-10-CM

## 2024-11-15 PROCEDURE — 99999 PR PBB SHADOW E&M-EST. PATIENT-LVL V: CPT | Mod: PBBFAC,,, | Performed by: ORTHOPAEDIC SURGERY

## 2024-11-15 NOTE — PROGRESS NOTES
Subjective:     Patient ID: Mitch Whittaker is a 68 y.o. male.    Chief Complaint: Pain of the Right Hand      HPI:  The patient is a 68-year-old male status post right carpal tunnel release 12/21/2023.  He did well with that surgery.  He was lifting up a 5 gal water jug and felt a pop in his right distal radioulnar joint this happened intermittently but now is much improved.  Date of this injury was 11/10/2024.    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 RNA NEGATIVE 5/2015, 2021, 2022    History of colon polyps     HPTH (hyperparathyroidism)     Hyperlipidemia     Hypertension associated with stage 3 chronic kidney disease due to type 2 diabetes mellitus     LBBB (left bundle branch block) 12/20/2021    Morbid obesity with BMI of 45.0-49.9, adult     Nephrolithiasis 6/7/2013    PCO (posterior capsular opacification), left 03/04/2019    Proteinuria     resolved s/p transplant    S/P kidney transplant     Sleep apnea     Type 2 diabetes, uncontrolled, with retinopathy     Type II diabetes mellitus with renal manifestations      Past Surgical History:   Procedure Laterality Date    CARDIAC CATHETERIZATION  01/01/2008    normal coronary    CARPAL TUNNEL RELEASE Right 12/01/2023     Procedure: RELEASE, CARPAL TUNNEL;  Surgeon: Noel Almonte MD;  Location: [facility] OR;  Service: Orthopedics;  Laterality: Right;    CARPAL TUNNEL RELEASE Left 2024    Procedure: RELEASE, CARPAL TUNNEL;  Surgeon: Noel Almonte MD;  Location: [facility] OR;  Service: Orthopedics;  Laterality: Left;    COLONOSCOPY N/A 2018    Procedure: COLONOSCOPY;  Surgeon: Chava Ronquillo MD;  Location: [facility] ENDO;  Service: Endoscopy;  Laterality: N/A;    COLONOSCOPY N/A 2022    Procedure: COLONOSCOPY;  Surgeon: Alix Puente MD;  Location: [facility] ENDO;  Service: Endoscopy;  Laterality: N/A;    COLONOSCOPY N/A 2023    Procedure: COLONOSCOPY - rule out CMV  Cardiac clearance/Eliquis hold approval received on 23 per Dr. Meade, cardiology.  Note in encounters.  LB;  Surgeon: Daniella Shah MD;  Location: [facility] ENDO;  Service: Endoscopy;  Laterality: N/A;    ESOPHAGOGASTRODUODENOSCOPY N/A 2024    Procedure: EGD (ESOPHAGOGASTRODUODENOSCOPY) 3/1-pt cleared, ok to hold eliquis for 3 days;  Surgeon: Daniella Shah MD;  Location: [facility];  Service: Endoscopy;  Laterality: N/A;    KIDNEY TRANSPLANT  2015    RETINAL LASER PROCEDURE       Family History   Problem Relation Name Age of Onset    Diabetes Mother      Hypertension Mother      Heart failure Mother      Heart failure Father      Kidney disease Sister          ESRD    Diabetes Sister      Diabetes Maternal Grandmother      Cancer Neg Hx       Social History     Socioeconomic History    Marital status:     Number of children: 2   Occupational History    Occupation: retired     Employer: Retired   Tobacco Use    Smoking status: Former     Current packs/day: 0.00     Types: Cigarettes     Quit date: 2013     Years since quittin.4     Passive exposure: Past    Smokeless tobacco: Former     Quit date: 2013    Tobacco comments:     used marijuana since [id number], stopped after started dialysis   Substance and  "Sexual Activity    Alcohol use: No     Alcohol/week: 0.0 standard drinks of alcohol    Drug use: Not Currently     Comment:      Sexual activity: Never   Social History Narrative    . Lives with spouse. Has 2 children. Patient retired as  for [employer]. He has been washing cars.     Social Drivers of Health     Financial Resource Strain: Low Risk  (10/2/2020)    Overall Financial Resource Strain (CARDIA)     Difficulty of Paying Living Expenses: Not hard at all   Transportation Needs: No Transportation Needs (10/2/2020)    PRAPARE - Transportation     Lack of Transportation (Medical): No     Lack of Transportation (Non-Medical): No   Stress: No Stress Concern Present (10/2/2020)    [employer] [employer] - Occupational Stress Questionnaire     Feeling of Stress : Not at all     Medication List with Changes/Refills   Current Medications    ALBUTEROL (PROVENTIL) 2.5 MG /3 ML (0.083 %) NEBULIZER SOLUTION    Take 3 mLs (2.5 mg total) by nebulization every 4 to 6 hours as needed for Wheezing or Shortness of Breath.    ALBUTEROL (PROVENTIL/VENTOLIN HFA) 90 MCG/ACTUATION INHALER    Inhale 2 puffs into the lungs every 4 (four) hours as needed for Wheezing or Shortness of Breath.    AMITRIPTYLINE (ELAVIL) 25 MG TABLET    Take 1 tablet (25 mg total) by mouth nightly as needed for Insomnia.    APIXABAN (ELIQUIS) 5 MG TAB    Take 1 tablet (5 mg total) by mouth 2 (two) times daily.    ASPIRIN (ECOTRIN) 81 MG EC TABLET    Take 1 tablet by mouth Daily.     BD ULTRA-FINE MINI PEN NEEDLE 31 GAUGE X 3/16" NDLE    Use to inject insulin as needed up to 4 times daily    BLOOD SUGAR DIAGNOSTIC (CONTOUR NEXT TEST STRIPS) STRP    Test blood sugar 4 (four) times daily before meals and nightly.    BLOOD-GLUCOSE METER (FREESTYLE LITE METER) KIT    1 each 4 (four) times daily.    BLOOD-GLUCOSE SENSOR (DEXCOM G7 SENSOR) VIC    Use as directed for continuous glucose monitoring; Change every 10 days. "    CALCITRIOL (ROCALTROL) 0.25 MCG CAP    Take 1 capsule (0.25 mcg total) by mouth once daily.    FAMOTIDINE (PEPCID) 20 MG TABLET    TAKE ONE TABLET BY MOUTH EVERY EVENING    FERROUS SULFATE (FEOSOL) 325 MG (65 MG IRON) TAB TABLET    Take 1 tablet (325 mg total) by mouth daily with breakfast.    FISH OIL-OMEGA-3 FATTY ACIDS 300-1,000 MG CAPSULE    Take 1 g by mouth once daily.    FUROSEMIDE (LASIX) 80 MG TABLET    Take one-half tablet (40 mg) by mouth 2 (two) times daily.    INSULIN GLARGINE U-100, LANTUS, (LANTUS SOLOSTAR U-100 INSULIN) 100 UNIT/ML (3 ML) INPN PEN    Inject 25 Units into the skin 2 (two) times daily.    KETOCONAZOLE (NIZORAL) 200 MG TAB    Take ½ tablet (100 mg total) by mouth once daily.    LANCETS (MICROLET LANCET) MISC    100 lancets by Misc.(Non-Drug; Combo Route) route 4 (four) times daily before meals and nightly.    MAGNESIUM OXIDE (MAG-OX) 400 MG (241.3 MG MAGNESIUM) TABLET    Take 1 tablet (400 mg total) by mouth once daily.    METOPROLOL SUCCINATE (TOPROL-XL) 25 MG 24 HR TABLET    Take 1 tablet (25 mg total) by mouth once daily.    MULTIVITAMIN (THERAGRAN) TABLET    Take 1 tablet by mouth once daily.    MYCOPHENOLATE (CELLCEPT) 250 MG CAP    Take 2 capsules (500 mg total) by mouth 2 (two) times daily.    NOVOLOG FLEXPEN U-100 INSULIN 100 UNIT/ML (3 ML) INPN PEN    Inject 25 Units into the skin 3 (three) times daily with meals.    ONDANSETRON (ZOFRAN) 4 MG TABLET    Take 1 tablet (4 mg total) by mouth every 6 (six) hours.    POTASSIUM CHLORIDE SA (K-DUR,KLOR-CON) 20 MEQ TABLET    Take 2 tablets (40 mEq total) by mouth once daily.    PREDNISONE (DELTASONE) 20 MG TABLET    Take 2 tablets (40 mg total) by mouth once daily. for 5 days    PREDNISONE (DELTASONE) 5 MG TABLET    Take 1 tablet (5 mg total) by mouth once daily.    ROSUVASTATIN (CRESTOR) 40 MG TAB    Take 1 tablet (40 mg total) by mouth once daily.    SACUBITRIL-VALSARTAN (ENTRESTO)  MG PER TABLET    Take 1 tablet by mouth 2  (two) times daily.    SEMAGLUTIDE (OZEMPIC) 2 MG/DOSE (8 MG/3 ML) PNIJ    Inject 2 mg into the skin every 7 days.    TACROLIMUS (PROGRAF) 1 MG CAP    Take 5 capsules (5 mg total) by mouth every 12 (twelve) hours.    TRIAMCINOLONE ACETONIDE 0.1% (KENALOG) 0.1 % OINTMENT    Apply topically 2 (two) times daily. Use on bilateral lower legs.     Review of patient's allergies indicates:   Allergen Reactions    Lisinopril Other (See Comments)     Other reaction(s):  cough    Actos  [pioglitazone] Other (See Comments)     Other reaction(s): CHF    Metformin Other (See Comments)     Other reaction(s): renal insuff  Other reaction(s): CHF     Review of Systems   Constitutional: Negative for malaise/fatigue.   HENT:  Negative for hearing loss.    Eyes:  Positive for visual disturbance. Negative for double vision.   Cardiovascular:  Positive for chest pain and irregular heartbeat.   Respiratory:  Positive for sleep disturbances due to breathing. Negative for shortness of breath.    Endocrine: Negative for cold intolerance.   Hematologic/Lymphatic: Does not bruise/bleed easily.   Skin:  Negative for poor wound healing and suspicious lesions.   Musculoskeletal:  Positive for neck pain. Negative for gout, joint pain and joint swelling.   Gastrointestinal:  Positive for diarrhea. Negative for nausea and vomiting.   Genitourinary:  Positive for frequency, hesitancy, incomplete emptying and nocturia. Negative for dysuria.   Neurological:  Positive for dizziness, numbness, paresthesias and sensory change.   Psychiatric/Behavioral:  Negative for depression, memory loss and substance abuse. The patient is not nervous/anxious.    Allergic/Immunologic: Negative for persistent infections.       Objective:   Body mass index is 44.51 kg/m².  There were no vitals filed for this visit.             General    Constitutional: He is oriented to person, place, and time. He appears well-developed and well-nourished. No distress.   HENT:   Head:  Normocephalic.   Eyes: EOM are normal.   Pulmonary/Chest: Effort normal.   Neurological: He is oriented to person, place, and time.   Psychiatric: He has a normal mood and affect.             Right Hand/Wrist Exam     Inspection   Scars: Wrist - present Hand -  present  Effusion: Wrist - absent Hand -  absent    Pain   Wrist - The patient exhibits pain of the TFCC.    Other     Neuorologic Exam    Median Distribution: normal  Ulnar Distribution: normal  Radial Distribution: normal    Comments:  The patient has well-healed carpal tunnel scar.  He is tender about the distal radioulnar joint.  There is full range of motion of the right hand and no motor or sensory deficits.          Vascular Exam       Capillary Refill  Right Hand: normal capillary refill          Relevant imaging results reviewed and interpreted by me, discussed with the patient and / or family today radiographs right hand showed osteoarthritic change and negative ulnar variance right distal radioulnar joint  Assessment:     Encounter Diagnoses   Name Primary?    Pain and swelling of right wrist     Right hand pain     Arthritis of right wrist Yes        Plan:     The patient seems to be clinically improving.  He has modified the way he lifts water jugs.  He declines injection.  He will return on a as needed basis.                Disclaimer: This note was prepared using a voice recognition system and is likely to have sound alike errors within the text.

## 2024-11-26 ENCOUNTER — HOSPITAL ENCOUNTER (OUTPATIENT)
Dept: CARDIOLOGY | Facility: HOSPITAL | Age: 69
Discharge: HOME | End: 2024-11-26
Attending: INTERNAL MEDICINE
Payer: COMMERCIAL

## 2024-11-26 ENCOUNTER — DOCUMENTATION ONLY (OUTPATIENT)
Dept: CARDIOLOGY | Facility: HOSPITAL | Age: 69
End: 2024-11-26
Payer: COMMERCIAL

## 2024-11-26 VITALS — HEIGHT: 66 IN | BODY MASS INDEX: 42.75 KG/M2 | WEIGHT: 266 LBS

## 2024-11-26 DIAGNOSIS — I87.2 CHRONIC VENOUS INSUFFICIENCY OF LOWER EXTREMITY: ICD-10-CM

## 2024-11-26 DIAGNOSIS — I50.33 ACUTE ON CHRONIC DIASTOLIC CONGESTIVE HEART FAILURE: ICD-10-CM

## 2024-11-26 DIAGNOSIS — I82.592 CHRONIC DEEP VEIN THROMBOSIS (DVT) OF OTHER VEIN OF LEFT LOWER EXTREMITY: ICD-10-CM

## 2024-11-26 DIAGNOSIS — Z94.0 KIDNEY TRANSPLANT STATUS, LIVING RELATED DONOR: ICD-10-CM

## 2024-11-26 DIAGNOSIS — I25.118 CORONARY ARTERY DISEASE OF NATIVE ARTERY OF NATIVE HEART WITH STABLE ANGINA PECTORIS: ICD-10-CM

## 2024-11-26 DIAGNOSIS — E66.01 CLASS 3 SEVERE OBESITY DUE TO EXCESS CALORIES WITH SERIOUS COMORBIDITY AND BODY MASS INDEX (BMI) OF 40.0 TO 44.9 IN ADULT: ICD-10-CM

## 2024-11-26 DIAGNOSIS — E66.813 CLASS 3 SEVERE OBESITY DUE TO EXCESS CALORIES WITH SERIOUS COMORBIDITY AND BODY MASS INDEX (BMI) OF 40.0 TO 44.9 IN ADULT: ICD-10-CM

## 2024-11-26 DIAGNOSIS — Z86.16 HISTORY OF COVID-19: ICD-10-CM

## 2024-11-26 DIAGNOSIS — I50.42 CHRONIC COMBINED SYSTOLIC AND DIASTOLIC CONGESTIVE HEART FAILURE: ICD-10-CM

## 2024-11-26 DIAGNOSIS — N18.30 STAGE 3 CHRONIC KIDNEY DISEASE, UNSPECIFIED WHETHER STAGE 3A OR 3B CKD: ICD-10-CM

## 2024-11-26 DIAGNOSIS — Z86.718 HISTORY OF DVT (DEEP VEIN THROMBOSIS): ICD-10-CM

## 2024-11-26 DIAGNOSIS — E11.69 TYPE 2 DIABETES MELLITUS WITH OTHER SPECIFIED COMPLICATION, WITH LONG-TERM CURRENT USE OF INSULIN: ICD-10-CM

## 2024-11-26 DIAGNOSIS — I49.3 PVC'S (PREMATURE VENTRICULAR CONTRACTIONS): ICD-10-CM

## 2024-11-26 DIAGNOSIS — G47.33 OBSTRUCTIVE SLEEP APNEA: ICD-10-CM

## 2024-11-26 DIAGNOSIS — N18.32 STAGE 3B CHRONIC KIDNEY DISEASE: ICD-10-CM

## 2024-11-26 DIAGNOSIS — Z79.4 TYPE 2 DIABETES MELLITUS WITH OTHER SPECIFIED COMPLICATION, WITH LONG-TERM CURRENT USE OF INSULIN: ICD-10-CM

## 2024-11-26 DIAGNOSIS — G47.33 OSA (OBSTRUCTIVE SLEEP APNEA): ICD-10-CM

## 2024-11-26 DIAGNOSIS — E66.01 MORBID OBESITY WITH BMI OF 40.0-44.9, ADULT: ICD-10-CM

## 2024-11-26 DIAGNOSIS — E66.01 SEVERE OBESITY (BMI >= 40): ICD-10-CM

## 2024-11-26 DIAGNOSIS — I44.7 LBBB (LEFT BUNDLE BRANCH BLOCK): ICD-10-CM

## 2024-11-26 LAB
AORTIC ROOT ANNULUS: 4.18 CM
ASCENDING AORTA: 3.79 CM
AV INDEX (PROSTH): 0.78
AV MEAN GRADIENT: 2 MMHG
AV PEAK GRADIENT: 4 MMHG
AV VALVE AREA BY VELOCITY RATIO: 3.4 CM²
AV VALVE AREA: 3.8 CM²
AV VELOCITY RATIO: 0.7
BSA FOR ECHO PROCEDURE: 2.37 M2
CV ECHO LV RWT: 0.68 CM
DOP CALC AO PEAK VEL: 1 M/S
DOP CALC AO VTI: 20.2 CM
DOP CALC LVOT AREA: 4.9 CM2
DOP CALC LVOT DIAMETER: 2.5 CM
DOP CALC LVOT PEAK VEL: 0.7 M/S
DOP CALC LVOT STROKE VOLUME: 77 CM3
DOP CALC RVOT PEAK VEL: 0.84 M/S
DOP CALC RVOT VTI: 15.7 CM
DOP CALCLVOT PEAK VEL VTI: 15.7 CM
E WAVE DECELERATION TIME: 243.93 MSEC
E/A RATIO: 0.58
E/E' RATIO: 10.6 M/S
ECHO LV POSTERIOR WALL: 1.8 CM (ref 0.6–1.1)
EJECTION FRACTION: 35 %
FRACTIONAL SHORTENING: 13.2 % (ref 28–44)
INTERVENTRICULAR SEPTUM: 1.6 CM (ref 0.6–1.1)
LA MAJOR: 5.38 CM
LA MINOR: 5.37 CM
LA WIDTH: 3.4 CM
LEFT ATRIUM SIZE: 4.78 CM
LEFT ATRIUM VOLUME INDEX: 32.9 ML/M2
LEFT ATRIUM VOLUME: 74.25 CM3
LEFT INTERNAL DIMENSION IN SYSTOLE: 4.6 CM (ref 2.1–4)
LEFT VENTRICLE DIASTOLIC VOLUME INDEX: 59.6 ML/M2
LEFT VENTRICLE DIASTOLIC VOLUME: 134.7 ML
LEFT VENTRICLE MASS INDEX: 187.9 G/M2
LEFT VENTRICLE SYSTOLIC VOLUME INDEX: 43 ML/M2
LEFT VENTRICLE SYSTOLIC VOLUME: 97.21 ML
LEFT VENTRICULAR INTERNAL DIMENSION IN DIASTOLE: 5.3 CM (ref 3.5–6)
LEFT VENTRICULAR MASS: 424.6 G
LV LATERAL E/E' RATIO: 10.6 M/S
LV SEPTAL E/E' RATIO: 10.6 M/S
LVED V (TEICH): 134.7 ML
LVES V (TEICH): 97.21 ML
LVOT MG: 1.2 MMHG
LVOT MV: 0.52 CM/S
MV PEAK A VEL: 0.92 M/S
MV PEAK E VEL: 0.53 M/S
MV STENOSIS PRESSURE HALF TIME: 70.74 MS
MV VALVE AREA P 1/2 METHOD: 3.11 CM2
PV MEAN GRADIENT: 1 MMHG
PV MV: 0.61 M/S
PV PEAK GRADIENT: 3 MMHG
PV PEAK VELOCITY: 0.9 M/S
RA PRESSURE ESTIMATED: 3 MMHG
SINUS: 3.7 CM
STJ: 3.74 CM
TDI LATERAL: 0.05 M/S
TDI SEPTAL: 0.05 M/S
TDI: 0.05 M/S
Z-SCORE OF LEFT VENTRICULAR DIMENSION IN END DIASTOLE: -4.47
Z-SCORE OF LEFT VENTRICULAR DIMENSION IN END SYSTOLE: -0.69

## 2024-11-26 PROCEDURE — 93306 TTE W/DOPPLER COMPLETE: CPT

## 2024-11-26 PROCEDURE — 25500020 PHARM REV CODE 255: Performed by: INTERNAL MEDICINE

## 2024-11-26 PROCEDURE — 93306 TTE W/DOPPLER COMPLETE: CPT | Mod: 26,,, | Performed by: INTERNAL MEDICINE

## 2024-11-26 RX ADMIN — PERFLUTREN 1.2 ML: 6.52 INJECTION, SUSPENSION INTRAVENOUS at 11:11

## 2024-11-26 NOTE — PROGRESS NOTES
Pt presented for an echocardiogram today.  This study was performed in conjunction with Definity contrast agent because of poor endocardial visualization.  Procedure was explained to the patient, he verbalized understanding and signed the consent.    Patient tolerated the procedure well.  IV discontinued, pressure dressing applied.

## 2024-12-10 ENCOUNTER — OFFICE VISIT (OUTPATIENT)
Dept: INTERNAL MEDICINE | Facility: CLINIC | Age: 69
End: 2024-12-10
Payer: COMMERCIAL

## 2024-12-10 VITALS
SYSTOLIC BLOOD PRESSURE: 86 MMHG | BODY MASS INDEX: 43.22 KG/M2 | RESPIRATION RATE: 18 BRPM | OXYGEN SATURATION: 99 % | HEIGHT: 66 IN | DIASTOLIC BLOOD PRESSURE: 54 MMHG | WEIGHT: 268.94 LBS | HEART RATE: 92 BPM

## 2024-12-10 DIAGNOSIS — E66.01 MORBID OBESITY WITH BMI OF 40.0-44.9, ADULT: ICD-10-CM

## 2024-12-10 DIAGNOSIS — E11.22 HYPERTENSION ASSOCIATED WITH STAGE 3 CHRONIC KIDNEY DISEASE DUE TO TYPE 2 DIABETES MELLITUS: ICD-10-CM

## 2024-12-10 DIAGNOSIS — E11.69 HYPERLIPIDEMIA ASSOCIATED WITH TYPE 2 DIABETES MELLITUS: ICD-10-CM

## 2024-12-10 DIAGNOSIS — E11.649 UNCONTROLLED TYPE 2 DIABETES MELLITUS WITH HYPOGLYCEMIA WITHOUT COMA: Primary | ICD-10-CM

## 2024-12-10 DIAGNOSIS — E78.5 HYPERLIPIDEMIA ASSOCIATED WITH TYPE 2 DIABETES MELLITUS: ICD-10-CM

## 2024-12-10 DIAGNOSIS — N25.81 SECONDARY HYPERPARATHYROIDISM OF RENAL ORIGIN: ICD-10-CM

## 2024-12-10 DIAGNOSIS — I12.9 HYPERTENSION ASSOCIATED WITH STAGE 3 CHRONIC KIDNEY DISEASE DUE TO TYPE 2 DIABETES MELLITUS: ICD-10-CM

## 2024-12-10 DIAGNOSIS — H11.32 NON-TRAUMATIC SUBCONJUNCTIVAL HEMORRHAGE OF LEFT EYE: ICD-10-CM

## 2024-12-10 DIAGNOSIS — N18.30 HYPERTENSION ASSOCIATED WITH STAGE 3 CHRONIC KIDNEY DISEASE DUE TO TYPE 2 DIABETES MELLITUS: ICD-10-CM

## 2024-12-10 PROCEDURE — 3066F NEPHROPATHY DOC TX: CPT | Mod: CPTII,S$GLB,, | Performed by: FAMILY MEDICINE

## 2024-12-10 PROCEDURE — 3074F SYST BP LT 130 MM HG: CPT | Mod: CPTII,S$GLB,, | Performed by: FAMILY MEDICINE

## 2024-12-10 PROCEDURE — 99999 PR PBB SHADOW E&M-EST. PATIENT-LVL V: CPT | Mod: PBBFAC,,, | Performed by: FAMILY MEDICINE

## 2024-12-10 PROCEDURE — 3008F BODY MASS INDEX DOCD: CPT | Mod: CPTII,S$GLB,, | Performed by: FAMILY MEDICINE

## 2024-12-10 PROCEDURE — 1126F AMNT PAIN NOTED NONE PRSNT: CPT | Mod: CPTII,S$GLB,, | Performed by: FAMILY MEDICINE

## 2024-12-10 PROCEDURE — 1160F RVW MEDS BY RX/DR IN RCRD: CPT | Mod: CPTII,S$GLB,, | Performed by: FAMILY MEDICINE

## 2024-12-10 PROCEDURE — 3078F DIAST BP <80 MM HG: CPT | Mod: CPTII,S$GLB,, | Performed by: FAMILY MEDICINE

## 2024-12-10 PROCEDURE — 3061F NEG MICROALBUMINURIA REV: CPT | Mod: CPTII,S$GLB,, | Performed by: FAMILY MEDICINE

## 2024-12-10 PROCEDURE — 99214 OFFICE O/P EST MOD 30 MIN: CPT | Mod: S$GLB,,, | Performed by: FAMILY MEDICINE

## 2024-12-10 PROCEDURE — G2211 COMPLEX E/M VISIT ADD ON: HCPCS | Mod: S$GLB,,, | Performed by: FAMILY MEDICINE

## 2024-12-10 PROCEDURE — 1159F MED LIST DOCD IN RCRD: CPT | Mod: CPTII,S$GLB,, | Performed by: FAMILY MEDICINE

## 2024-12-10 PROCEDURE — 3288F FALL RISK ASSESSMENT DOCD: CPT | Mod: CPTII,S$GLB,, | Performed by: FAMILY MEDICINE

## 2024-12-10 PROCEDURE — 3051F HG A1C>EQUAL 7.0%<8.0%: CPT | Mod: CPTII,S$GLB,, | Performed by: FAMILY MEDICINE

## 2024-12-10 PROCEDURE — 4010F ACE/ARB THERAPY RXD/TAKEN: CPT | Mod: CPTII,S$GLB,, | Performed by: FAMILY MEDICINE

## 2024-12-10 PROCEDURE — 1101F PT FALLS ASSESS-DOCD LE1/YR: CPT | Mod: CPTII,S$GLB,, | Performed by: FAMILY MEDICINE

## 2024-12-12 DIAGNOSIS — E11.649 UNCONTROLLED TYPE 2 DIABETES MELLITUS WITH HYPOGLYCEMIA WITHOUT COMA: ICD-10-CM

## 2024-12-12 RX ORDER — INSULIN GLARGINE 100 [IU]/ML
30 INJECTION, SOLUTION SUBCUTANEOUS 2 TIMES DAILY
Qty: 30 ML | Refills: 2 | Status: SHIPPED | OUTPATIENT
Start: 2024-12-12

## 2024-12-12 NOTE — TELEPHONE ENCOUNTER
LOV 11/8/2024   Next visit- 12/20/2024    I updated lantus from 25 units to 30 ints BID per last note on 11/18

## 2024-12-12 NOTE — TELEPHONE ENCOUNTER
----- Message from Samanta sent at 12/12/2024 10:43 AM CST -----  Contact: self   Requesting an RX refill or new RX.    Is this a refill or new RX:   insulin glargine U-100, Lantus, (LANTUS SOLOSTAR U-100 INSULIN) 100 unit/mL (3 mL) InPn pen  RX name and strength (copy/paste from chart):      Is this a 30 day or 90 day RX:     Pharmacy name and phone # (copy/paste from chart):    [facility] [address] [place] [id number]  Phone: [phone] Fax: [phone]        The doctors have asked that we provide their patients with the following 2 reminders -- prescription refills can take up to 72 hours, and a friendly reminder that in the future you can use your [facility] account to request refills: yes    Requesting an RX refill or new RX.    Is this a refill or new RX:     RX name and strength (copy/paste from chart):  NOVOLOG FLEXPEN U-100 INSULIN 100 unit/mL (3 mL) InPn pen    Is this a 30 day or 90 day RX:     Pharmacy name and phone # (copy/paste from chart):    [facility] [address] [place] [id number]  Phone: [phone] Fax: [phone]        The doctors have asked that we provide their patients with the following 2 reminders -- prescription refills can take up to 72 hours, and a friendly reminder that in the future you can use your [facility] account to request refills: yes

## 2024-12-13 PROBLEM — E78.5 HYPERLIPIDEMIA ASSOCIATED WITH TYPE 2 DIABETES MELLITUS: Status: ACTIVE | Noted: 2024-12-13

## 2024-12-13 PROBLEM — E11.69 HYPERLIPIDEMIA ASSOCIATED WITH TYPE 2 DIABETES MELLITUS: Status: ACTIVE | Noted: 2024-12-13

## 2024-12-14 NOTE — PROGRESS NOTES
"Subjective:       Patient ID: Mitch Whittaker is a 69 y.o. male.    Chief Complaint: Follow-up      History of Present Illness    Patient presents to clinic today for followup of chronic conditions.  - Patient arrived for his scheduled follow-up visit to review labs results related to his diabetes management. Patient reported no weakness or dizziness related to his blood pressure.  - Patient presented with a new issue in his left eye, noticed that morning. He denied any pain or vision changes associated with this eye problem. Patient had already consulted an eye doctor, who suggested it might be related to frequent coughing. Eye drops were prescribed for treatment.  - Patient confirmed ongoing care with various specialists, including his nephrologist, cardiologist, orthopedist, and diabetic nurse practitioner.  Patient is otherwise without concerns today.      Review of Systems   Constitutional:  Negative for chills, fatigue, fever and unexpected weight change.   Eyes:  Negative for visual disturbance.   Respiratory:  Negative for shortness of breath.    Cardiovascular:  Negative for chest pain.   Musculoskeletal:  Negative for myalgias.   Neurological:  Negative for headaches.         Objective:     Vitals:    12/10/24 1517   BP: (!) 86/54   Pulse: 92   Resp: 18   SpO2: 99%   Weight: 122 kg (268 lb 15.4 oz)   Height: 5' 6" (1.676 m)      Physical Exam  Vitals reviewed.   Constitutional:       General: He is not in acute distress.     Appearance: He is well-developed.   HENT:      Head: Normocephalic and atraumatic.   Eyes:      General: Lids are normal. No scleral icterus.     Extraocular Movements: Extraocular movements intact.      Conjunctiva/sclera:      Left eye: Hemorrhage present.      Pupils: Pupils are equal, round, and reactive to light.   Pulmonary:      Effort: Pulmonary effort is normal.   Neurological:      Mental Status: He is alert and oriented to person, place, and time.      Cranial Nerves: No " cranial nerve deficit.      Gait: Gait normal.   Psychiatric:         Mood and Affect: Mood and affect normal.        Assessment:       1. Uncontrolled type 2 diabetes mellitus with hypoglycemia without coma    2. Hypertension associated with stage 3 chronic kidney disease due to type 2 diabetes mellitus    3. Hyperlipidemia associated with type 2 diabetes mellitus    4. Secondary hyperparathyroidism of renal origin    5. Morbid obesity with BMI of 40.0-44.9, adult    6. Non-traumatic subconjunctival hemorrhage of left eye        Plan:   1. Uncontrolled type 2 diabetes mellitus with hypoglycemia without coma  -     Hemoglobin A1C; Future; Expected date: 12/10/2024    2. Hypertension associated with stage 3 chronic kidney disease due to type 2 diabetes mellitus    3. Hyperlipidemia associated with type 2 diabetes mellitus  -     Comprehensive Metabolic Panel; Future; Expected date: 12/10/2024  -     Lipid Panel; Future; Expected date: 12/10/2024    4. Secondary hyperparathyroidism of renal origin  Overview:  Followed by Nephrology, continue current treatment plan       5. Morbid obesity with BMI of 40.0-44.9, adult    6. Non-traumatic subconjunctival hemorrhage of left eye      Assessment & Plan    DIABETES:   Status pending lab, continue current medications    Confirmed that the patient is seeing a diabetic nurse practitioner.    HYPERTENSION:   BP low, asymptomatic, continue current medications.    CARDIOVASCULAR HEALTH:   Advised the patient to seek medical attention if experiencing weakness or dizziness.   Followed by Cardiology, continue current treatment plan.    NEPHROLOGY:   Confirmed that the patient is seeing a nephrologist.    SUBCONJUNCTIVAL HEMORRHAGE:   Evaluated left eye condition, likely a subconjunctival hemorrhage due to coughing.   Confirmed that the patient denies vision changes or pain in the eye.   Verified that the patient has consulted an ophthalmologist for the issue.    SPECIALIST CARE:    Reviewed patient's current specialist care, including cardiology, orthopedics, nephrology, and diabetic management.    RSV VACCINATION:   Discussed that RSV vaccine is available in the pharmacy.    COVID-19 VACCINATION:   Verified patient's COVID-19 vaccination status, noting recent booster in October.    OTHER VACCINATIONS:   Confirmed up-to-date status on pneumonia, flu, and shingles vaccinations.    Patient expressed understanding and agreement with plan.    Visit today included increased complexity associated with the care of the episodic problem subconjunctival hemorrhage, which was addressed while instituting co-management of the longitudinal care of the patient due to the serious and/or complex managed problem(s) .    I have evaluated and discussed management associated with medical care services that serve as the continuing focal point for all needed health care services and/or with medical care services that are part of ongoing care related to my patient's single, serious condition or a complex condition(s).    I am providing ongoing care and I am the primary care provider for this patient, and they are being managed, monitored, and/or observed for their chronic conditions over time.     I have addressed their ongoing health maintenance requirements and needs for all health care services and reviewed co-management plans provided by specialty providers when available.    Health Maintenance Due   Topic Date Due    RSV Vaccine (Age 60+ and Pregnant patients) (1 - Risk 60-74 years 1-dose series) Never done    Foot Exam  10/10/2023    Hemoglobin A1c  12/18/2024    Lipid Panel  12/20/2024     Health Maintenance reviewed/updated.    Follow up in about 6 months (around 6/10/2025), or if symptoms worsen or fail to improve, for annual with Rosio VINCENT, schedule labs (next Tuesday).    This note was generated with the assistance of ambient listening technology. Verbal consent was obtained by the patient and  accompanying visitor(s) for the recording of patient appointment to facilitate this note. I attest to having reviewed and edited the generated note for accuracy, though some syntax or spelling errors may persist. Please contact the author of this note for any clarification.

## 2024-12-17 ENCOUNTER — LAB VISIT (OUTPATIENT)
Dept: LAB | Facility: HOSPITAL | Age: 69
End: 2024-12-17
Attending: FAMILY MEDICINE
Payer: COMMERCIAL

## 2024-12-17 DIAGNOSIS — E11.69 HYPERLIPIDEMIA ASSOCIATED WITH TYPE 2 DIABETES MELLITUS: ICD-10-CM

## 2024-12-17 DIAGNOSIS — E78.5 HYPERLIPIDEMIA ASSOCIATED WITH TYPE 2 DIABETES MELLITUS: ICD-10-CM

## 2024-12-17 DIAGNOSIS — E11.649 UNCONTROLLED TYPE 2 DIABETES MELLITUS WITH HYPOGLYCEMIA WITHOUT COMA: ICD-10-CM

## 2024-12-17 LAB
ALBUMIN SERPL BCP-MCNC: 3.2 G/DL (ref 3.5–5.2)
ALP SERPL-CCNC: 52 U/L (ref 40–150)
ALT SERPL W/O P-5'-P-CCNC: 10 U/L (ref 10–44)
ANION GAP SERPL CALC-SCNC: 11 MMOL/L (ref 8–16)
AST SERPL-CCNC: 17 U/L (ref 10–40)
BILIRUB SERPL-MCNC: 0.5 MG/DL (ref 0.1–1)
BUN SERPL-MCNC: 35 MG/DL (ref 8–23)
CALCIUM SERPL-MCNC: 9.1 MG/DL (ref 8.7–10.5)
CHLORIDE SERPL-SCNC: 107 MMOL/L (ref 95–110)
CHOLEST SERPL-MCNC: 175 MG/DL (ref 120–199)
CHOLEST/HDLC SERPL: 3.5 {RATIO} (ref 2–5)
CO2 SERPL-SCNC: 27 MMOL/L (ref 23–29)
CREAT SERPL-MCNC: 2.3 MG/DL (ref 0.5–1.4)
EST. GFR  (NO RACE VARIABLE): 29.6 ML/MIN/1.73 M^2
ESTIMATED AVG GLUCOSE: 117 MG/DL (ref 68–131)
GLUCOSE SERPL-MCNC: 106 MG/DL (ref 70–110)
HBA1C MFR BLD: 5.7 % (ref 4–5.6)
HDLC SERPL-MCNC: 50 MG/DL (ref 40–75)
HDLC SERPL: 28.6 % (ref 20–50)
LDLC SERPL CALC-MCNC: 98.2 MG/DL (ref 63–159)
NONHDLC SERPL-MCNC: 125 MG/DL
POTASSIUM SERPL-SCNC: 3.8 MMOL/L (ref 3.5–5.1)
PROT SERPL-MCNC: 6.2 G/DL (ref 6–8.4)
SODIUM SERPL-SCNC: 145 MMOL/L (ref 136–145)
TRIGL SERPL-MCNC: 134 MG/DL (ref 30–150)

## 2024-12-17 PROCEDURE — 83036 HEMOGLOBIN GLYCOSYLATED A1C: CPT | Performed by: FAMILY MEDICINE

## 2024-12-17 PROCEDURE — 80053 COMPREHEN METABOLIC PANEL: CPT | Performed by: FAMILY MEDICINE

## 2024-12-17 PROCEDURE — 80061 LIPID PANEL: CPT | Performed by: FAMILY MEDICINE

## 2024-12-20 ENCOUNTER — OFFICE VISIT (OUTPATIENT)
Dept: DIABETES | Facility: CLINIC | Age: 69
End: 2024-12-20
Payer: COMMERCIAL

## 2024-12-20 VITALS
BODY MASS INDEX: 43.77 KG/M2 | SYSTOLIC BLOOD PRESSURE: 120 MMHG | DIASTOLIC BLOOD PRESSURE: 69 MMHG | HEART RATE: 98 BPM | WEIGHT: 271.19 LBS

## 2024-12-20 DIAGNOSIS — Z94.0 KIDNEY TRANSPLANT STATUS, LIVING RELATED DONOR: Chronic | ICD-10-CM

## 2024-12-20 DIAGNOSIS — E11.3553 TYPE 2 DIABETES MELLITUS WITH STABLE PROLIFERATIVE RETINOPATHY OF BOTH EYES, WITH LONG-TERM CURRENT USE OF INSULIN: ICD-10-CM

## 2024-12-20 DIAGNOSIS — E66.01 SEVERE OBESITY (BMI >= 40): ICD-10-CM

## 2024-12-20 DIAGNOSIS — Z79.4 TYPE 2 DIABETES MELLITUS WITH STABLE PROLIFERATIVE RETINOPATHY OF BOTH EYES, WITH LONG-TERM CURRENT USE OF INSULIN: ICD-10-CM

## 2024-12-20 DIAGNOSIS — E11.649 UNCONTROLLED TYPE 2 DIABETES MELLITUS WITH HYPOGLYCEMIA WITHOUT COMA: Primary | ICD-10-CM

## 2024-12-20 DIAGNOSIS — E11.21 TYPE 2 DIABETES MELLITUS WITH DIABETIC NEPHROPATHY, UNSPECIFIED WHETHER LONG TERM INSULIN USE: ICD-10-CM

## 2024-12-20 LAB — GLUCOSE SERPL-MCNC: 93 MG/DL (ref 70–110)

## 2024-12-20 PROCEDURE — 99999 PR PBB SHADOW E&M-EST. PATIENT-LVL V: CPT | Mod: PBBFAC,,, | Performed by: NURSE PRACTITIONER

## 2024-12-20 NOTE — PROGRESS NOTES
Patient ID: Mitch Whittaker is a 69 y.o. male.  Patient's current PCP is Valery Caal MD.   Collaborating Physician: GERRY Ramirez MD    Chief Complaint: Diabetes Mellitus    HPI  Mitch Whittaker is a 69 y.o. Black or  male presenting for a follow up for diabetes. HX KIDNEY TRANSPLANT 2015 - on prednisone 5 mg every am      Patient has been diagnosed with type 2 diabetes since 1982 .  Complications related to diabetes:  nephropathy - Dr Gonsalez ([facility])  Retinopathy - Dr Zuniga  peripheral neuropathy  cardiovascular disease - Dr Muhammad (started Ozempic)  peripheral vascular disease  Recent diabetes related hospitalizations: None recently  Previous diabetes education:YES, years ago    Current diet: Eating 3 meals per day. Occasionally skipping lunch. Working with RD. Doing much better with family support  Activity level: limited    Changes made at last visit:  Continue Ozempic 2 mg weekly  Lower Lantus to 20 units in am and 15 units at night  Change Novolog to 10 units before breakfast plus sliding scale (Food insulin)                                    0 units before lunch plus sliding scale                                    8 units before supper plus sliding scale  If blood sugar is          <60    =  subtract 2 units   60-80    =  subtract 1 unit     =  No change - still take food insulin  141-180 =  add 1 unit  181-220 = add 2 units  221-260 = add 3 units  261-300 = add 4 units  301-340 = add 5 units        Current issues: Doing well. Stable     Personal history of pancreatitis: denies  Personal history of abdominal surgery: denies  Personal history of thyroid surgery: denies  Family history of pancreatic cancer in first-degree relative: denies  Family history of MTC/MEN/endocrine tumors: denies     Past Medical History:   Diagnosis Date    Acquired renal cyst of left kidney     Anemia associated with chronic renal failure     CAD (coronary artery disease)     nonobstructive [facility] 9/14     CHF (congestive heart failure)     Chronic immunosuppression with Prograf and MMF 2015    Chronic venous insufficiency of lower extremity     CKD (chronic kidney disease) stage 3, GFR 30-59 ml/min     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 RNA NEGATIVE 2015, ,     History of colon polyps     HPTH (hyperparathyroidism)     Hyperlipidemia     Hypertension associated with stage 3 chronic kidney disease due to type 2 diabetes mellitus     LBBB (left bundle branch block) 2021    Morbid obesity with BMI of 45.0-49.9, adult     Nephrolithiasis 2013    PCO (posterior capsular opacification), left 2019    Proteinuria     resolved s/p transplant    S/P kidney transplant     Sleep apnea     Type 2 diabetes, uncontrolled, with retinopathy     Type II diabetes mellitus with renal manifestations      Social History     Socioeconomic History    Marital status:     Number of children: 2   Occupational History    Occupation: retired     Employer: Retired   Tobacco Use    Smoking status: Former     Current packs/day: 0.00     Types: Cigarettes     Quit date: 2013     Years since quittin.5     Passive exposure: Past    Smokeless tobacco: Former     Quit date: 2013    Tobacco comments:     used marijuana since [id number], stopped after started dialysis   Substance and Sexual Activity    Alcohol use: No     Alcohol/week: 0.0 standard drinks of alcohol    Drug use: Not Currently     Comment:      Sexual activity: Never   Social History Narrative    . Lives with spouse. Has 2 children. Patient retired as  for [employer].  He has been washing cars.     Social Drivers of Health     Financial Resource Strain: Low Risk  (10/2/2020)    Overall Financial Resource Strain (CARDIA)     Difficulty of Paying Living Expenses: Not hard at all   Transportation Needs: No Transportation Needs (10/2/2020)    PRAPARE - Transportation     Lack of Transportation (Medical): No     Lack of Transportation (Non-Medical): No   Stress: No Stress Concern Present (10/2/2020)    [employer] [place] [employer] - Occupational Stress Questionnaire     Feeling of Stress : Not at all       Review of patient's allergies indicates:   Allergen Reactions    Lisinopril Other (See Comments)     Other reaction(s):  cough    Actos  [pioglitazone] Other (See Comments)     Other reaction(s): CHF    Metformin Other (See Comments)     Other reaction(s): renal insuff  Other reaction(s): CHF       CURRENT DM MEDICATIONS:   Diabetes Medications               NOVOLOG FLEXPEN U-100 INSULIN 100 unit/mL (3 mL) InPn pen Inject 25 Units into the skin 3 (three) times daily with meals.  10 units before breakfast plus sliding scale (Food insulin)                                    0 units before lunch plus sliding scale                                    8 units before supper plus sliding scale    semaglutide (OZEMPIC) 2 mg/dose (8 mg/3 mL) PnIj Inject 2 mg into the skin every 7 days.    insulin glargine U-100, Lantus, (LANTUS SOLOSTAR U-100 INSULIN) 100 unit/mL (3 mL) InPn pen Inject 25 Units into the skin 2 (two) times daily.    Taking 20 units in am and 15 units in pm          Past failed treatment(s) include:   Actos - CHF  Metformin - CRI  Glimepiride - hypoglycemia    Meter/cgm: Dexcom G7  Blood glucose testing is performed regularly.   Patient is testing Continuously times per day.  Any episodes of hypoglycemia? Yes, dropping after lunch and overnight  Glucose trends: Per Dexcom download, for the last 14 days:    Average glucose of 141 mg/dL. He is 86% in range. 14% of  "readings are mildly elevated with 0% of readings > 250. 0% hypoglycemia. SD 36 mg/dL. Estimated GMI 6.7%. Trending down overnight and after lunch.       His blood sugar in the clinic today was:   Lab Results   Component Value Date    POCGLU 93 12/20/2024       Statin: Taking  ACE/ARB: Not taking    Labs reviewed and are noted below.    Screening or Prevention Patient's value Goal Complete/Controlled?   HgA1C Testing and Control   Lab Results   Component Value Date    HGBA1C 5.7 (H) 12/17/2024      Annually/Less than 8% Yes   Lipid profile : 12/17/2024 Annually Yes   LDL control Lab Results   Component Value Date    LDLCALC 98.2 12/17/2024    Annually/Less than 100 mg/dl  yes   Nephropathy screening Lab Results   Component Value Date    MICALBCREAT 10.0 09/17/2024     Lab Results   Component Value Date    PROTEINUA Negative 03/18/2024    Annually Yes   Blood pressure BP Readings from Last 1 Encounters:   12/20/24 120/69    Less than 140/90 Yes   Dilated retinal exam : 06/28/2024 Annually Yes    Foot exam    DUE Annually No     Glucose   Date Value Ref Range Status   12/17/2024 106 70 - 110 mg/dL Final     Anion Gap   Date Value Ref Range Status   12/17/2024 11 8 - 16 mmol/L Final     eGFR if    Date Value Ref Range Status   07/06/2022 22.7 (A) >60 mL/min/1.73 m^2 Final     eGFR if non    Date Value Ref Range Status   07/06/2022 19.6 (A) >60 mL/min/1.73 m^2 Final     Comment:     Calculation used to obtain the estimated glomerular filtration  rate (eGFR) is the CKD-EPI equation.        Lab Results   Component Value Date    FREET4 0.97 07/08/2013    TSH 0.999 03/11/2022     No results found for: "CPEPTIDE"  No results found for: "GLUTAMICACID"    Wt Readings from Last 3 Encounters:   12/20/24 1541 123 kg (271 lb 2.7 oz)   12/10/24 1517 122 kg (268 lb 15.4 oz)   11/26/24 1128 120.7 kg (266 lb)       Review of Systems   Constitutional:  Negative for malaise/fatigue and weight loss.   Eyes: "  Negative for blurred vision and double vision.   Respiratory:  Negative for shortness of breath and wheezing.    Cardiovascular:  Positive for leg swelling.   Gastrointestinal: Negative.    Genitourinary:  Negative for frequency.   Musculoskeletal:  Negative for myalgias.   Neurological: Negative.    Endo/Heme/Allergies:  Negative for polydipsia.   Psychiatric/Behavioral: Negative.         Physical Exam  Vitals reviewed.   Constitutional:       General: He is not in acute distress.     Appearance: Normal appearance. He is obese.   Eyes:      Conjunctiva/sclera: Conjunctivae normal.      Pupils: Pupils are equal, round, and reactive to light.   Cardiovascular:      Rate and Rhythm: Normal rate and regular rhythm.      Pulses: Normal pulses.      Heart sounds: Normal heart sounds.   Pulmonary:      Breath sounds: No wheezing or rales.   Abdominal:      General: There is no distension.      Palpations: Abdomen is soft.   Musculoskeletal:      Right lower leg: Edema present.      Left lower leg: Edema present.   Skin:     General: Skin is warm and dry.      Comments: Chronic venous stasis changes noted to LE bilaterally. No signs of acute infection   Neurological:      General: No focal deficit present.      Mental Status: He is alert and oriented to person, place, and time.   Psychiatric:         Mood and Affect: Mood normal.           Assessment & Plan    Uncontrolled type 2 diabetes mellitus with hypoglycemia without coma- At goal  -     POCT Glucose, Hand-Held Device  Continue Ozempic 2 mg weekly  Lower Lantus to 20 units in am and 10 units at night  Change Novolog to 10 units before breakfast plus sliding scale (Food insulin)                                    0 units before lunch plus sliding scale                                    8 units before supper plus sliding scale  If blood sugar is          <60    =  subtract 2 units   60-80    =  subtract 1 unit     =  No change - still take food insulin  141-180  =  add 1 unit  181-220 = add 2 units  221-260 = add 3 units  261-300 = add 4 units  301-340 = add 5 units    Call for bs <80 or > 180 consistently  Consider lowering am Lantus if continues with drops pc lunch    Type 2 diabetes mellitus with diabetic nephropathy, unspecified whether long term insulin use    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, with long-term current use of insulin    Severe obesity (BMI >= 40)  - Reinforced diet and exercise  - Continue Ozempic    Living-related donor kidney transplant (daughter) - 6/12/15 - managed by specialists  - Noted on chronic prednisone every am    - Reviewed with patient:  Mechanism of action and action time of medications/insulins  Use of home glucose monitor/cgm  Interpretation of cgm graphs  Basic diet/carbohydrate counting/avoiding simple sugars/plate method  Proper hydration   Risk of complications and preventive measures  When to call for assistance          - Follow up: 3 months    Visit today included increased complexity associated with the care of the episodic problem fluctuating glucoses addressed and managing the longitudinal care of the patient due to the serious and/or complex managed problem(s) T2dm.

## 2024-12-20 NOTE — PATIENT INSTRUCTIONS
Continue Ozempic 2 mg weekly  Lower Lantus to 20 units in am and 10 units at night  Change Novolog to 10 units before breakfast plus sliding scale (Food insulin)                                    0 units before lunch plus sliding scale                                    8 units before supper plus sliding scale  If blood sugar is          <60    =  subtract 2 units   60-80    =  subtract 1 unit     =  No change - still take food insulin  141-180 =  add 1 unit  181-220 = add 2 units  221-260 = add 3 units  261-300 = add 4 units  301-340 = add 5 units

## 2024-12-26 ENCOUNTER — LAB VISIT (OUTPATIENT)
Dept: LAB | Facility: HOSPITAL | Age: 69
End: 2024-12-26
Attending: INTERNAL MEDICINE
Payer: COMMERCIAL

## 2024-12-26 DIAGNOSIS — Z94.0 KIDNEY REPLACED BY TRANSPLANT: ICD-10-CM

## 2024-12-26 LAB
ALBUMIN SERPL BCP-MCNC: 3.3 G/DL (ref 3.5–5.2)
ALP SERPL-CCNC: 55 U/L (ref 40–150)
ALT SERPL W/O P-5'-P-CCNC: 8 U/L (ref 10–44)
ANION GAP SERPL CALC-SCNC: 13 MMOL/L (ref 8–16)
AST SERPL-CCNC: 17 U/L (ref 10–40)
BASOPHILS # BLD AUTO: ABNORMAL K/UL (ref 0–0.2)
BASOPHILS NFR BLD: 0 % (ref 0–1.9)
BILIRUB SERPL-MCNC: 0.4 MG/DL (ref 0.1–1)
BUN SERPL-MCNC: 50 MG/DL (ref 8–23)
CALCIUM SERPL-MCNC: 9.4 MG/DL (ref 8.7–10.5)
CHLORIDE SERPL-SCNC: 108 MMOL/L (ref 95–110)
CO2 SERPL-SCNC: 24 MMOL/L (ref 23–29)
CREAT SERPL-MCNC: 3.1 MG/DL (ref 0.5–1.4)
DACRYOCYTES BLD QL SMEAR: ABNORMAL
DIFFERENTIAL METHOD BLD: ABNORMAL
EOSINOPHIL # BLD AUTO: ABNORMAL K/UL (ref 0–0.5)
EOSINOPHIL NFR BLD: 0 % (ref 0–8)
ERYTHROCYTE [DISTWIDTH] IN BLOOD BY AUTOMATED COUNT: 13.9 % (ref 11.5–14.5)
EST. GFR  (NO RACE VARIABLE): 21 ML/MIN/1.73 M^2
FERRITIN SERPL-MCNC: 455 NG/ML (ref 20–300)
GLUCOSE SERPL-MCNC: 115 MG/DL (ref 70–110)
HCT VFR BLD AUTO: 38.5 % (ref 40–54)
HGB BLD-MCNC: 11.1 G/DL (ref 14–18)
IMM GRANULOCYTES # BLD AUTO: ABNORMAL K/UL (ref 0–0.04)
IMM GRANULOCYTES NFR BLD AUTO: ABNORMAL % (ref 0–0.5)
IRON SERPL-MCNC: 24 UG/DL (ref 45–160)
LDH SERPL L TO P-CCNC: 491 U/L (ref 110–260)
LYMPHOCYTES # BLD AUTO: ABNORMAL K/UL (ref 1–4.8)
LYMPHOCYTES NFR BLD: 19 % (ref 18–48)
MCH RBC QN AUTO: 25.3 PG (ref 27–31)
MCHC RBC AUTO-ENTMCNC: 28.8 G/DL (ref 32–36)
MCV RBC AUTO: 88 FL (ref 82–98)
MONOCYTES # BLD AUTO: ABNORMAL K/UL (ref 0.3–1)
MONOCYTES NFR BLD: 5 % (ref 4–15)
NEUTROPHILS NFR BLD: 76 % (ref 38–73)
NRBC BLD-RTO: 0 /100 WBC
PLATELET # BLD AUTO: 198 K/UL (ref 150–450)
PLATELET BLD QL SMEAR: ABNORMAL
PMV BLD AUTO: 10.8 FL (ref 9.2–12.9)
POIKILOCYTOSIS BLD QL SMEAR: SLIGHT
POTASSIUM SERPL-SCNC: 4.3 MMOL/L (ref 3.5–5.1)
PROT SERPL-MCNC: 6.3 G/DL (ref 6–8.4)
RBC # BLD AUTO: 4.39 M/UL (ref 4.6–6.2)
SATURATED IRON: 10 % (ref 20–50)
SCHISTOCYTES BLD QL SMEAR: PRESENT
SODIUM SERPL-SCNC: 145 MMOL/L (ref 136–145)
TOTAL IRON BINDING CAPACITY: 244 UG/DL (ref 250–450)
TRANSFERRIN SERPL-MCNC: 165 MG/DL (ref 200–375)
WBC # BLD AUTO: 2.68 K/UL (ref 3.9–12.7)

## 2024-12-26 PROCEDURE — 82728 ASSAY OF FERRITIN: CPT | Performed by: INTERNAL MEDICINE

## 2024-12-26 PROCEDURE — 83615 LACTATE (LD) (LDH) ENZYME: CPT | Performed by: INTERNAL MEDICINE

## 2024-12-26 PROCEDURE — 36415 COLL VENOUS BLD VENIPUNCTURE: CPT | Performed by: INTERNAL MEDICINE

## 2024-12-26 PROCEDURE — 84466 ASSAY OF TRANSFERRIN: CPT | Performed by: INTERNAL MEDICINE

## 2024-12-26 PROCEDURE — 80053 COMPREHEN METABOLIC PANEL: CPT | Performed by: INTERNAL MEDICINE

## 2024-12-26 PROCEDURE — 85027 COMPLETE CBC AUTOMATED: CPT | Performed by: INTERNAL MEDICINE

## 2024-12-26 PROCEDURE — 85007 BL SMEAR W/DIFF WBC COUNT: CPT | Performed by: INTERNAL MEDICINE

## 2024-12-30 ENCOUNTER — OFFICE VISIT (OUTPATIENT)
Dept: HEMATOLOGY/ONCOLOGY | Facility: CLINIC | Age: 69
End: 2024-12-30
Payer: COMMERCIAL

## 2024-12-30 VITALS
WEIGHT: 266 LBS | SYSTOLIC BLOOD PRESSURE: 124 MMHG | HEART RATE: 98 BPM | HEIGHT: 67 IN | DIASTOLIC BLOOD PRESSURE: 64 MMHG | BODY MASS INDEX: 41.75 KG/M2 | OXYGEN SATURATION: 99 % | TEMPERATURE: 98 F

## 2024-12-30 DIAGNOSIS — Z79.01 CHRONIC ANTICOAGULATION: Primary | ICD-10-CM

## 2024-12-30 DIAGNOSIS — D89.89 LAMBDA LIGHT CHAIN DISEASE: ICD-10-CM

## 2024-12-30 DIAGNOSIS — D50.0 IRON DEFICIENCY ANEMIA DUE TO CHRONIC BLOOD LOSS: ICD-10-CM

## 2024-12-30 DIAGNOSIS — N18.9 ANEMIA ASSOCIATED WITH CHRONIC RENAL FAILURE: ICD-10-CM

## 2024-12-30 DIAGNOSIS — I82.492 ACUTE DEEP VEIN THROMBOSIS (DVT) OF OTHER SPECIFIED VEIN OF LEFT LOWER EXTREMITY: ICD-10-CM

## 2024-12-30 DIAGNOSIS — D63.1 ANEMIA ASSOCIATED WITH CHRONIC RENAL FAILURE: ICD-10-CM

## 2024-12-30 PROCEDURE — 3074F SYST BP LT 130 MM HG: CPT | Mod: CPTII,S$GLB,,

## 2024-12-30 PROCEDURE — 4010F ACE/ARB THERAPY RXD/TAKEN: CPT | Mod: CPTII,S$GLB,,

## 2024-12-30 PROCEDURE — 1101F PT FALLS ASSESS-DOCD LE1/YR: CPT | Mod: CPTII,S$GLB,,

## 2024-12-30 PROCEDURE — 99999 PR PBB SHADOW E&M-EST. PATIENT-LVL V: CPT | Mod: PBBFAC,,,

## 2024-12-30 PROCEDURE — 3044F HG A1C LEVEL LT 7.0%: CPT | Mod: CPTII,S$GLB,,

## 2024-12-30 PROCEDURE — 3078F DIAST BP <80 MM HG: CPT | Mod: CPTII,S$GLB,,

## 2024-12-30 PROCEDURE — 3061F NEG MICROALBUMINURIA REV: CPT | Mod: CPTII,S$GLB,,

## 2024-12-30 PROCEDURE — 1159F MED LIST DOCD IN RCRD: CPT | Mod: CPTII,S$GLB,,

## 2024-12-30 PROCEDURE — 3288F FALL RISK ASSESSMENT DOCD: CPT | Mod: CPTII,S$GLB,,

## 2024-12-30 PROCEDURE — 3008F BODY MASS INDEX DOCD: CPT | Mod: CPTII,S$GLB,,

## 2024-12-30 PROCEDURE — 1125F AMNT PAIN NOTED PAIN PRSNT: CPT | Mod: CPTII,S$GLB,,

## 2024-12-30 PROCEDURE — 3066F NEPHROPATHY DOC TX: CPT | Mod: CPTII,S$GLB,,

## 2024-12-30 PROCEDURE — 99214 OFFICE O/P EST MOD 30 MIN: CPT | Mod: S$GLB,,,

## 2024-12-30 RX ORDER — HEPARIN 100 UNIT/ML
500 SYRINGE INTRAVENOUS
OUTPATIENT
Start: 2025-01-06

## 2024-12-30 RX ORDER — SODIUM CHLORIDE 0.9 % (FLUSH) 0.9 %
10 SYRINGE (ML) INJECTION
OUTPATIENT
Start: 2025-01-06

## 2024-12-30 RX ORDER — DIPHENHYDRAMINE HYDROCHLORIDE 50 MG/ML
50 INJECTION INTRAMUSCULAR; INTRAVENOUS ONCE AS NEEDED
OUTPATIENT
Start: 2025-01-06

## 2024-12-30 RX ORDER — EPINEPHRINE 0.3 MG/.3ML
0.3 INJECTION SUBCUTANEOUS ONCE AS NEEDED
OUTPATIENT
Start: 2025-01-06

## 2024-12-30 NOTE — PROGRESS NOTES
Subjective:       Patient ID: Mitch Whittaker is a 69 y.o. male.    Chief Complaint: Anemia    HPI: Mr. Whittaker is a 69 year old male who is following up for his anemia due to ckd and iron def anemia. He also has pancytopenia, workup unremarkable, likely related ot multiple comorbid conditions. He has not need iv iron thus far or epo. Last colonoscopy 2023, showing a couple of polyps, recommended to follow up in 5 years.   Pmhx: kidney transplanted, on immunosuppression    Today: He has taken oral iron previously but right now he is having some bowel issues, diarrhea sometimes and constipation sometimes. He denies any bleeding. .    Social History     Socioeconomic History    Marital status:     Number of children: 2   Occupational History    Occupation: retired     Employer: Retired   Tobacco Use    Smoking status: Former     Current packs/day: 0.00     Types: Cigarettes     Quit date: 2013     Years since quittin.6     Passive exposure: Past    Smokeless tobacco: Former     Quit date: 2013    Tobacco comments:     used marijuana since [id number], stopped after started dialysis   Substance and Sexual Activity    Alcohol use: No     Alcohol/week: 0.0 standard drinks of alcohol    Drug use: Not Currently     Comment:      Sexual activity: Never   Social History Narrative    . Lives with spouse. Has 2 children. Patient retired as  for [employer]. He has been washing cars.     Social Drivers of Health     Financial Resource Strain: Low Risk  (10/2/2020)    Overall Financial Resource Strain (CARDIA)     Difficulty of Paying Living Expenses: Not hard at all   Transportation Needs: No Transportation Needs (10/2/2020)    PRAPARE - Transportation     Lack of Transportation (Medical): No     Lack of Transportation (Non-Medical): No   Stress: No Stress Concern Present (10/2/2020)    [employer] - Occupational Stress Questionnaire     Feeling of  Stress : Not at all       Past Medical History:   Diagnosis Date    Acquired renal cyst of left kidney     Anemia associated with chronic renal failure     CAD (coronary artery disease)     nonobstructive lhc 9/14    CHF (congestive heart failure)     Chronic immunosuppression with Prograf and MMF 06/18/2015    Chronic venous insufficiency of lower extremity     CKD (chronic kidney disease) stage 3, GFR 30-59 ml/min     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 RNA NEGATIVE 5/2015, 2021, 2022    History of colon polyps     HPTH (hyperparathyroidism)     Hyperlipidemia     Hypertension associated with stage 3 chronic kidney disease due to type 2 diabetes mellitus     LBBB (left bundle branch block) 12/20/2021    Morbid obesity with BMI of 45.0-49.9, adult     Nephrolithiasis 6/7/2013    PCO (posterior capsular opacification), left 03/04/2019    Proteinuria     resolved s/p transplant    S/P kidney transplant     Sleep apnea     Type 2 diabetes, uncontrolled, with retinopathy     Type II diabetes mellitus with renal manifestations        Family History   Problem Relation Name Age of Onset    Diabetes Mother      Hypertension Mother      Heart failure Mother      Heart failure Father      Kidney disease Sister          ESRD    Diabetes Sister      Diabetes Maternal Grandmother      Cancer Neg Hx         Past Surgical History:   Procedure Laterality Date    CARDIAC CATHETERIZATION  01/01/2008    normal coronary    CARPAL TUNNEL RELEASE Right 12/01/2023    Procedure: RELEASE, CARPAL TUNNEL;  Surgeon: Noel Almonte MD;  Location: [facility];  Service: Orthopedics;  Laterality:  Right;    CARPAL TUNNEL RELEASE Left 7/18/2024    Procedure: RELEASE, CARPAL TUNNEL;  Surgeon: Noel Almonte MD;  Location: [facility] OR;  Service: Orthopedics;  Laterality: Left;    COLONOSCOPY N/A 04/05/2018    Procedure: COLONOSCOPY;  Surgeon: Chava Ronquillo MD;  Location: [facility] END[facility];  Service: Endoscopy;  Laterality: N/A;    COLONOSCOPY N/A 05/02/2022    Procedure: COLONOSCOPY;  Surgeon: Alix Puente MD;  Location: [facility] END[facility];  Service: Endoscopy;  Laterality: N/A;    COLONOSCOPY N/A 06/07/2023    Procedure: COLONOSCOPY - rule out CMV  Cardiac clearance/Eliquis hold approval received on 05/21/23 per Dr. Meade, cardiology.  Note in encounters.  LB;  Surgeon: Daniella Shah MD;  Location: [facility] ENDO;  Service: Endoscopy;  Laterality: N/A;    ESOPHAGOGASTRODUODENOSCOPY N/A 03/26/2024    Procedure: EGD (ESOPHAGOGASTRODUODENOSCOPY) 3/1-pt cleared, ok to hold eliquis for 3 days;  Surgeon: Daniella Shah MD;  Location: [facility] END[facility];  Service: Endoscopy;  Laterality: N/A;    KIDNEY TRANSPLANT  2015    RETINAL LASER PROCEDURE         Review of Systems   Constitutional:  Negative for activity change, appetite change, chills, diaphoresis, fatigue, fever and unexpected weight change.   HENT:  Negative for congestion.    Respiratory:  Negative for cough and shortness of breath.    Cardiovascular:  Negative for chest pain and leg swelling.   Gastrointestinal:  Negative for abdominal pain, anal bleeding, blood in stool, constipation, diarrhea, nausea and vomiting.   Genitourinary:  Negative for hematuria.   Musculoskeletal:  Positive for arthralgias.         Medication List with Changes/Refills   Current Medications    ALBUTEROL (PROVENTIL) 2.5 MG /3 ML (0.083 %) NEBULIZER SOLUTION    Take 3 mLs (2.5 mg total) by nebulization every 4 to 6 hours as needed for Wheezing or Shortness of Breath.    ALBUTEROL (PROVENTIL/VENTOLIN HFA) 90 MCG/ACTUATION INHALER    Inhale 2 puffs into the lungs every 4 (four) hours as  "needed for Wheezing or Shortness of Breath.    AMITRIPTYLINE (ELAVIL) 25 MG TABLET    Take 1 tablet (25 mg total) by mouth nightly as needed for Insomnia.    APIXABAN (ELIQUIS) 5 MG TAB    Take 1 tablet (5 mg total) by mouth 2 (two) times daily.    ASPIRIN (ECOTRIN) 81 MG EC TABLET    Take 1 tablet by mouth Daily.     BD ULTRA-FINE MINI PEN NEEDLE 31 GAUGE X 3/16" NDLE    Use to inject insulin as needed up to 4 times daily    BLOOD SUGAR DIAGNOSTIC (CONTOUR NEXT TEST STRIPS) STRP    Test blood sugar 4 (four) times daily before meals and nightly.    BLOOD-GLUCOSE METER (FREESTYLE LITE METER) KIT    1 each 4 (four) times daily.    BLOOD-GLUCOSE SENSOR (DEXCOM G7 SENSOR) VIC    Use as directed for continuous glucose monitoring; Change every 10 days.    CALCITRIOL (ROCALTROL) 0.25 MCG CAP    Take 1 capsule (0.25 mcg total) by mouth once daily.    FAMOTIDINE (PEPCID) 20 MG TABLET    TAKE ONE TABLET BY MOUTH EVERY EVENING    FERROUS SULFATE (FEOSOL) 325 MG (65 MG IRON) TAB TABLET    Take 1 tablet (325 mg total) by mouth daily with breakfast.    FISH OIL-OMEGA-3 FATTY ACIDS 300-1,000 MG CAPSULE    Take 1 g by mouth once daily.    FUROSEMIDE (LASIX) 80 MG TABLET    Take one-half tablet (40 mg) by mouth 2 (two) times daily.    INSULIN GLARGINE U-100, LANTUS, (LANTUS SOLOSTAR U-100 INSULIN) 100 UNIT/ML (3 ML) INPN PEN    Inject 30 Units into the skin 2 (two) times daily.    KETOCONAZOLE (NIZORAL) 200 MG TAB    Take ½ tablet (100 mg total) by mouth once daily.    LANCETS (MICROLET LANCET) MISC    100 lancets by Misc.(Non-Drug; Combo Route) route 4 (four) times daily before meals and nightly.    MAGNESIUM OXIDE (MAG-OX) 400 MG (241.3 MG MAGNESIUM) TABLET    Take 1 tablet (400 mg total) by mouth once daily.    METOPROLOL SUCCINATE (TOPROL-XL) 25 MG 24 HR TABLET    Take 1 tablet (25 mg total) by mouth once daily.    MULTIVITAMIN (THERAGRAN) TABLET    Take 1 tablet by mouth once daily.    MYCOPHENOLATE (CELLCEPT) 250 MG CAP    " Take 2 capsules (500 mg total) by mouth 2 (two) times daily.    NOVOLOG FLEXPEN U-100 INSULIN 100 UNIT/ML (3 ML) INPN PEN    Inject 25 Units into the skin 3 (three) times daily with meals.    ONDANSETRON (ZOFRAN) 4 MG TABLET    Take 1 tablet (4 mg total) by mouth every 6 (six) hours.    POTASSIUM CHLORIDE SA (K-DUR,KLOR-CON) 20 MEQ TABLET    Take 2 tablets (40 mEq total) by mouth once daily.    PREDNISONE (DELTASONE) 5 MG TABLET    Take 1 tablet (5 mg total) by mouth once daily.    ROSUVASTATIN (CRESTOR) 40 MG TAB    Take 1 tablet (40 mg total) by mouth once daily.    SACUBITRIL-VALSARTAN (ENTRESTO)  MG PER TABLET    Take 1 tablet by mouth 2 (two) times daily.    SEMAGLUTIDE (OZEMPIC) 2 MG/DOSE (8 MG/3 ML) PNIJ    Inject 2 mg into the skin every 7 days.    TACROLIMUS (PROGRAF) 1 MG CAP    Take 5 capsules (5 mg total) by mouth every 12 (twelve) hours.    TRIAMCINOLONE ACETONIDE 0.1% (KENALOG) 0.1 % OINTMENT    Apply topically 2 (two) times daily. Use on bilateral lower legs.     Objective:     Vitals:    12/30/24 1054   BP: 124/64   Pulse: 98   Temp: 97.5 °F (36.4 °C)       Physical Exam  Vitals reviewed.   Constitutional:       General: He is not in acute distress.     Appearance: He is not ill-appearing, toxic-appearing or diaphoretic.   HENT:      Head: Normocephalic and atraumatic.   Cardiovascular:      Rate and Rhythm: Normal rate.   Skin:     General: Skin is warm.      Coloration: Skin is not jaundiced or pale.      Findings: No bruising or erythema.   Neurological:      Mental Status: He is alert.   Psychiatric:         Mood and Affect: Mood normal.         Behavior: Behavior normal.         Thought Content: Thought content normal.          Labs/Results:  Lab Results   Component Value Date    WBC 2.68 (L) 12/26/2024    RBC 4.39 (L) 12/26/2024    HGB 11.1 (L) 12/26/2024    HCT 38.5 (L) 12/26/2024    MCV 88 12/26/2024    MCH 25.3 (L) 12/26/2024    MCHC 28.8 (L) 12/26/2024    RDW 13.9 12/26/2024    PLT  198 12/26/2024    MPV 10.8 12/26/2024    GRAN 76.0 (H) 12/26/2024    LYMPH CANCELED 12/26/2024    LYMPH 19.0 12/26/2024    MONO CANCELED 12/26/2024    MONO 5.0 12/26/2024    EOS CANCELED 12/26/2024    BASO CANCELED 12/26/2024    EOSINOPHIL 0.0 12/26/2024    BASOPHIL 0.0 12/26/2024     CMP  Sodium   Date Value Ref Range Status   12/26/2024 145 136 - 145 mmol/L Final     Potassium   Date Value Ref Range Status   12/26/2024 4.3 3.5 - 5.1 mmol/L Final     Chloride   Date Value Ref Range Status   12/26/2024 108 95 - 110 mmol/L Final     CO2   Date Value Ref Range Status   12/26/2024 24 23 - 29 mmol/L Final     Glucose   Date Value Ref Range Status   12/26/2024 115 (H) 70 - 110 mg/dL Final     BUN   Date Value Ref Range Status   12/26/2024 50 (H) 8 - 23 mg/dL Final     Creatinine   Date Value Ref Range Status   12/26/2024 3.1 (H) 0.5 - 1.4 mg/dL Final     Calcium   Date Value Ref Range Status   12/26/2024 9.4 8.7 - 10.5 mg/dL Final     Total Protein   Date Value Ref Range Status   12/26/2024 6.3 6.0 - 8.4 g/dL Final     Albumin   Date Value Ref Range Status   12/26/2024 3.3 (L) 3.5 - 5.2 g/dL Final     Total Bilirubin   Date Value Ref Range Status   12/26/2024 0.4 0.1 - 1.0 mg/dL Final     Comment:     For infants and newborns, interpretation of results should be based  on gestational age, weight and in agreement with clinical  observations.    Premature Infant recommended reference ranges:  Up to 24 hours.............<8.0 mg/dL  Up to 48 hours............<12.0 mg/dL  3-5 days..................<15.0 mg/dL  6-29 days.................<15.0 mg/dL       Alkaline Phosphatase   Date Value Ref Range Status   12/26/2024 55 40 - 150 U/L Final     AST   Date Value Ref Range Status   12/26/2024 17 10 - 40 U/L Final     ALT   Date Value Ref Range Status   12/26/2024 8 (L) 10 - 44 U/L Final     Anion Gap   Date Value Ref Range Status   12/26/2024 13 8 - 16 mmol/L Final     eGFR   Date Value Ref Range Status   12/26/2024 21 (A) >60  mL/min/1.73 m^2 Final      Latest Reference Range & Units 12/26/24 11:12   Lactate Dehydrogenase 110 - 260 U/L 491 (H)      Latest Reference Range & Units 12/26/24 11:12   Iron 45 - 160 ug/dL 24 (L)   TIBC 250 - 450 ug/dL 244 (L)   Saturated Iron 20 - 50 % 10 (L)   Transferrin 200 - 375 mg/dL 165 (L)   Ferritin 20.0 - 300.0 ng/mL 455 (H)       Assessment:     Problem List Items Addressed This Visit       Anemia associated with chronic renal failure    Relevant Orders    CBC Auto Differential    Comprehensive Metabolic Panel    Ferritin    Iron and TIBC    Deep vein thrombosis (DVT) of lower extremity    Lambda light chain disease    Relevant Orders    Immunofixation Electrophoresis    Protein Electrophoresis, Serum    Immunoglobulin Free LT Chains Blood    Chronic anticoagulation - Primary    Iron deficiency anemia due to chronic blood loss    Relevant Orders    CBC Auto Differential    Comprehensive Metabolic Panel    Ferritin    Iron and TIBC     Plan:     Chronic anticoagulation, Acute deep vein thrombosis (DVT) of other specified vein of left lower extremity  --bleeding precautions given  --continues on eliquis  --non occlusive thrombus in PTV in left but no thrombus in POP vein on left.     Iron deficiency anemia due to chronic blood loss  --iron: 24, sat: 10%, ferritin: 455-iron def  --representative of anemia due to chronic diease  --recommend increasing oral iron intake through diet  --iv iron ferrlexit x 4 doses weekly.   --Last colonoscopy 6/2023, showing a couple of polyps, recommended to follow up in 5 years.    Anemia associated with chronic renal failure  --has not needed epo thus far  --hgb:11.1  --if hgb is consistently <10g.dL, would initiate epo  --continue to follow with nephrology and transplant team    --Encouraged the patient to adhere to the age-appropriate health maintenance guidelines including screening tests and vaccinations.   --continue to follow with pulmonology for lung  nodules      Follow-Up: iv iron ferrlexcit x 4 doses weekly. 6-9 months with cbc cmp spep elder light chains iron/tibc ferritin prior     Michelle Barton PA-C  Hematology Oncology    Route Chart for Scheduling    Med Onc Chart Routing      Follow up with physician    Follow up with TANYA . 6-9 months with cbc mp irno/tibc ferritin spep elder light chains prior   Infusion scheduling note   iv iron ferrlecit x 4 doses weekly. afternoons   Injection scheduling note    Labs Ferritin, free light chains, CBC, iron and TIBC, CMP and SPEP   Scheduling:  Preferred lab:  Lab interval:     Imaging    Pharmacy appointment    Other referrals                    Therapy Plan Information  FERRLECIT (FERRIC GLUCONATE) QW for Anemia associated with chronic renal failure, noted on 6/7/2013  FERRLECIT (FERRIC GLUCONATE) QW for Iron deficiency anemia due to chronic blood loss, noted on 3/26/2024  Medications  ferric gluconate (FERRLECIT) 125 mg in 0.9% NaCl 100 mL IVPB  125 mg, Intravenous, 1 time a week  Anaphylaxis/Hypersensitivity  EPINEPHrine (EPIPEN) 0.3 mg/0.3 mL pen injection 0.3 mg  0.3 mg, Intramuscular, PRN  diphenhydrAMINE injection 50 mg  50 mg, Intravenous, PRN  hydrocortisone sodium succinate injection 100 mg  100 mg, Intravenous, PRN  Flushes  heparin, porcine (PF) 100 unit/mL injection flush 500 Units  500 Units, Intravenous, PRN  sodium chloride 0.9% flush 10 mL  10 mL, Intravenous, 1 time a week  0.9% NaCl 100 mL flush bag  Intravenous, 1 time a week      No therapy plan of the specified type found.    No therapy plan of the specified type found.

## 2025-01-03 DIAGNOSIS — E66.01 SEVERE OBESITY (BMI >= 40): ICD-10-CM

## 2025-01-03 DIAGNOSIS — E11.69 TYPE 2 DIABETES MELLITUS WITH OTHER SPECIFIED COMPLICATION, WITH LONG-TERM CURRENT USE OF INSULIN: ICD-10-CM

## 2025-01-03 DIAGNOSIS — Z79.4 TYPE 2 DIABETES MELLITUS WITH OTHER SPECIFIED COMPLICATION, WITH LONG-TERM CURRENT USE OF INSULIN: ICD-10-CM

## 2025-01-03 RX ORDER — SEMAGLUTIDE 2.68 MG/ML
2 INJECTION, SOLUTION SUBCUTANEOUS
Qty: 6 ML | Refills: 1 | Status: SHIPPED | OUTPATIENT
Start: 2025-01-03 | End: 2026-01-03

## 2025-01-06 ENCOUNTER — HOSPITAL ENCOUNTER (EMERGENCY)
Facility: HOSPITAL | Age: 70
Discharge: HOME | End: 2025-01-06
Attending: EMERGENCY MEDICINE
Payer: COMMERCIAL

## 2025-01-06 VITALS
RESPIRATION RATE: 20 BRPM | TEMPERATURE: 98 F | SYSTOLIC BLOOD PRESSURE: 135 MMHG | HEART RATE: 106 BPM | OXYGEN SATURATION: 99 % | DIASTOLIC BLOOD PRESSURE: 60 MMHG

## 2025-01-06 DIAGNOSIS — M17.11 OSTEOARTHRITIS OF RIGHT KNEE, UNSPECIFIED OSTEOARTHRITIS TYPE: Primary | ICD-10-CM

## 2025-01-06 DIAGNOSIS — R11.2 NAUSEA AND VOMITING, UNSPECIFIED VOMITING TYPE: ICD-10-CM

## 2025-01-06 DIAGNOSIS — R52 PAIN: ICD-10-CM

## 2025-01-06 DIAGNOSIS — M79.89 LEG SWELLING: ICD-10-CM

## 2025-01-06 DIAGNOSIS — N18.9 CHRONIC KIDNEY DISEASE, UNSPECIFIED CKD STAGE: ICD-10-CM

## 2025-01-06 DIAGNOSIS — E11.649 UNCONTROLLED TYPE 2 DIABETES MELLITUS WITH HYPOGLYCEMIA WITHOUT COMA: ICD-10-CM

## 2025-01-06 DIAGNOSIS — M79.89 RIGHT LEG SWELLING: ICD-10-CM

## 2025-01-06 LAB
ALBUMIN SERPL BCP-MCNC: 3.1 G/DL (ref 3.5–5.2)
ALP SERPL-CCNC: 64 U/L (ref 40–150)
ALT SERPL W/O P-5'-P-CCNC: 10 U/L (ref 10–44)
ANION GAP SERPL CALC-SCNC: 15 MMOL/L (ref 8–16)
APTT PPP: 35 SEC (ref 21–32)
AST SERPL-CCNC: 19 U/L (ref 10–40)
BASOPHILS NFR BLD: 0 % (ref 0–1.9)
BILIRUB SERPL-MCNC: 0.5 MG/DL (ref 0.1–1)
BILIRUB UR QL STRIP: NEGATIVE
BUN SERPL-MCNC: 42 MG/DL (ref 8–23)
CALCIUM SERPL-MCNC: 9.9 MG/DL (ref 8.7–10.5)
CHLORIDE SERPL-SCNC: 105 MMOL/L (ref 95–110)
CLARITY UR: CLEAR
CO2 SERPL-SCNC: 24 MMOL/L (ref 23–29)
COLOR UR: YELLOW
CREAT SERPL-MCNC: 2.8 MG/DL (ref 0.5–1.4)
DIFFERENTIAL METHOD BLD: ABNORMAL
EOSINOPHIL NFR BLD: 0 % (ref 0–8)
ERYTHROCYTE [DISTWIDTH] IN BLOOD BY AUTOMATED COUNT: 13.7 % (ref 11.5–14.5)
EST. GFR  (NO RACE VARIABLE): 23 ML/MIN/1.73 M^2
GLUCOSE SERPL-MCNC: 54 MG/DL (ref 70–110)
GLUCOSE UR QL STRIP: NEGATIVE
HCT VFR BLD AUTO: 38.7 % (ref 40–54)
HGB BLD-MCNC: 11.1 G/DL (ref 14–18)
HGB UR QL STRIP: NEGATIVE
IMM GRANULOCYTES # BLD AUTO: ABNORMAL K/UL (ref 0–0.04)
IMM GRANULOCYTES NFR BLD AUTO: ABNORMAL % (ref 0–0.5)
INR PPP: 1.1 (ref 0.8–1.2)
KETONES UR QL STRIP: NEGATIVE
LEUKOCYTE ESTERASE UR QL STRIP: NEGATIVE
LYMPHOCYTES NFR BLD: 12 % (ref 18–48)
MCH RBC QN AUTO: 24.9 PG (ref 27–31)
MCHC RBC AUTO-ENTMCNC: 28.7 G/DL (ref 32–36)
MCV RBC AUTO: 87 FL (ref 82–98)
MONOCYTES NFR BLD: 21 % (ref 4–15)
NEUTROPHILS NFR BLD: 67 % (ref 38–73)
NITRITE UR QL STRIP: NEGATIVE
NRBC BLD-RTO: 0 /100 WBC
PH UR STRIP: 6 [PH] (ref 5–8)
PLATELET # BLD AUTO: 246 K/UL (ref 150–450)
PLATELET BLD QL SMEAR: ABNORMAL
PMV BLD AUTO: 10.9 FL (ref 9.2–12.9)
POTASSIUM SERPL-SCNC: 4.5 MMOL/L (ref 3.5–5.1)
PROT SERPL-MCNC: 6.9 G/DL (ref 6–8.4)
PROT UR QL STRIP: NEGATIVE
PROTHROMBIN TIME: 12.4 SEC (ref 9–12.5)
RBC # BLD AUTO: 4.45 M/UL (ref 4.6–6.2)
SODIUM SERPL-SCNC: 144 MMOL/L (ref 136–145)
SP GR UR STRIP: 1.01 (ref 1–1.03)
URN SPEC COLLECT METH UR: NORMAL
UROBILINOGEN UR STRIP-ACNC: NEGATIVE EU/DL
WBC # BLD AUTO: 2.96 K/UL (ref 3.9–12.7)

## 2025-01-06 PROCEDURE — 85730 THROMBOPLASTIN TIME PARTIAL: CPT | Performed by: NURSE PRACTITIONER

## 2025-01-06 PROCEDURE — 29505 APPLICATION LONG LEG SPLINT: CPT | Mod: RT

## 2025-01-06 PROCEDURE — 81003 URINALYSIS AUTO W/O SCOPE: CPT | Performed by: EMERGENCY MEDICINE

## 2025-01-06 PROCEDURE — 80053 COMPREHEN METABOLIC PANEL: CPT | Performed by: NURSE PRACTITIONER

## 2025-01-06 PROCEDURE — 85610 PROTHROMBIN TIME: CPT | Performed by: NURSE PRACTITIONER

## 2025-01-06 PROCEDURE — 63600175 PHARM REV CODE 636 W HCPCS: Performed by: EMERGENCY MEDICINE

## 2025-01-06 PROCEDURE — 85027 COMPLETE CBC AUTOMATED: CPT | Performed by: NURSE PRACTITIONER

## 2025-01-06 PROCEDURE — 25000003 PHARM REV CODE 250: Performed by: EMERGENCY MEDICINE

## 2025-01-06 PROCEDURE — 85007 BL SMEAR W/DIFF WBC COUNT: CPT | Performed by: NURSE PRACTITIONER

## 2025-01-06 PROCEDURE — 99285 EMERGENCY DEPT VISIT HI MDM: CPT | Mod: 25

## 2025-01-06 PROCEDURE — 96374 THER/PROPH/DIAG INJ IV PUSH: CPT

## 2025-01-06 RX ORDER — ONDANSETRON 4 MG/1
4 TABLET, ORALLY DISINTEGRATING ORAL EVERY 8 HOURS PRN
Qty: 12 TABLET | Refills: 0 | Status: SHIPPED | OUTPATIENT
Start: 2025-01-06

## 2025-01-06 RX ORDER — HYDROCODONE BITARTRATE AND ACETAMINOPHEN 10; 325 MG/1; MG/1
1 TABLET ORAL
Status: COMPLETED | OUTPATIENT
Start: 2025-01-06 | End: 2025-01-06

## 2025-01-06 RX ORDER — HYDROCODONE BITARTRATE AND ACETAMINOPHEN 5; 325 MG/1; MG/1
1 TABLET ORAL EVERY 6 HOURS PRN
Qty: 20 TABLET | Refills: 0 | Status: ON HOLD | OUTPATIENT
Start: 2025-01-06 | End: 2025-01-29

## 2025-01-06 RX ORDER — ONDANSETRON HYDROCHLORIDE 2 MG/ML
4 INJECTION, SOLUTION INTRAVENOUS
Status: COMPLETED | OUTPATIENT
Start: 2025-01-06 | End: 2025-01-06

## 2025-01-06 RX ADMIN — HYDROCODONE BITARTRATE AND ACETAMINOPHEN 1 TABLET: 10; 325 TABLET ORAL at 07:01

## 2025-01-06 RX ADMIN — ONDANSETRON 4 MG: 2 INJECTION INTRAMUSCULAR; INTRAVENOUS at 08:01

## 2025-01-06 NOTE — FIRST PROVIDER EVALUATION
Medical screening examination initiated.  I have conducted a focused provider triage encounter, findings are as follows:    Brief history of present illness:  Patient presents the ER for right lower extremity swelling.  Patient also reports fluctuating blood sugars.    Vitals:    01/06/25 1312   BP: 119/65   BP Location: Right arm   Pulse: 107   Resp: 20   Temp: 98.4 °F (36.9 °C)   TempSrc: Oral   SpO2: 97%   Weight: Comment: unable to stand due to knee pain.       Pertinent physical exam:  No acute distress    Brief workup plan:  Labs, imaging, further eval    Preliminary workup initiated; this workup will be continued and followed by the physician or advanced practice provider that is assigned to the patient when roomed.

## 2025-01-06 NOTE — TELEPHONE ENCOUNTER
Refill Routing Note   Medication(s) are not appropriate for processing by [facility] for the following reason(s):        Required labs abnormal    [facility] action(s):  Defer             Pharmacist review requested: Yes     Appointments  past 12m or future 3m with PCP    Date Provider   Last Visit   12/10/2024 Valery Caal MD   Next Visit   Visit date not found Valery Caal MD   ED visits in past 90 days: 0        Note composed:4:38 PM 01/06/2025

## 2025-01-07 RX ORDER — INSULIN ASPART 100 [IU]/ML
25 INJECTION, SOLUTION INTRAVENOUS; SUBCUTANEOUS
Qty: 30 ML | Refills: 1 | Status: SHIPPED | OUTPATIENT
Start: 2025-01-07

## 2025-01-07 NOTE — TELEPHONE ENCOUNTER
Care Due:                  Date            Visit Type   Department     Provider  --------------------------------------------------------------------------------                                EP -                              PRIMARY      [facility] INTER[facility]  Last Visit: 12-      CARE ([facility])   MEDICINE       Valery Caal  Next Visit: None Scheduled  None         None Found                                                            Last  Test          Frequency    Reason                     Performed    Due Date  --------------------------------------------------------------------------------    Mg Level....  12 months..  magnesium................  Not Found    Overdue    [employer] Embedded Care Due Messages. Reference number: [id number].   1/07/2025 2:48:26 PM CST

## 2025-01-07 NOTE — ED PROVIDER NOTES
SCRIBE #1 NOTE: I, Enedelia Loving, am scribing for, and in the presence of, Dianne Owusu MD. I have scribed the HPI, ROS, and PE.    SCRIBE #2 NOTE: I, Fransico Cesar, am scribing for, and in the presence of,  Jaymie Fried DO. I have scribed the remaining portions of the note not scribed by Scribe #1.      History     Chief Complaint   Patient presents with    Leg Swelling     Right leg swelling with blisters since 12/28.  Hx of DM, Also reports chronic right knee when the weather gets cold. Denies shortness of breath or chest pain.     Review of patient's allergies indicates:   Allergen Reactions    Lisinopril Other (See Comments)     Other reaction(s):  cough    Actos  [pioglitazone] Other (See Comments)     Other reaction(s): CHF    Metformin Other (See Comments)     Other reaction(s): renal insuff  Other reaction(s): CHF         History of Present Illness     HPI    1/6/2025, 6:21 PM  History obtained from the daughter and patient      History of Present Illness: Mitch Whittaker is a 69 y.o. male patient with a PMHx of CHF, anemia, HPTH, CHF, HF, edema, HTN, DM Type 2, CAD, CKD, hyperlipidemia, PCO, LBBB who presents to the Emergency Department for evaluation of right-sided leg swelling which onset gradually since 12/28. Pt states he cannot put any weight on his leg. Pt's daughter states pt has been walking with a limp since last week. Pt states he fell this morning but reports of no injury before. Pt uses a 4 point walkePt states pain around his knee. Pt's daughter states pt had a kidney transplant in 2015. Pt has bilateral blisters to legs. Pt states he feels retained fluid in his legs.  Symptoms are constant and moderate in severity. Patient denies any fever, N/V, headache, CP, SOB, and all other sxs at this time. No prior tx reported. No further complaints or concerns at this time.       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 RNA NEGATIVE 5/2015, 2021, 2022    History of colon polyps     HPTH (hyperparathyroidism)     Hyperlipidemia     Hypertension associated with stage 3 chronic kidney disease due to type 2 diabetes mellitus     LBBB (left bundle branch block) 12/20/2021    Morbid obesity with BMI of 45.0-49.9, adult     Nephrolithiasis 6/7/2013    PCO (posterior capsular opacification), left 03/04/2019    Proteinuria     resolved s/p transplant    S/P kidney transplant     Sleep apnea     Type 2 diabetes, uncontrolled, with retinopathy     Type II diabetes mellitus with renal manifestations        Past Surgical History:  Past Surgical History:   Procedure Laterality Date    CARDIAC CATHETERIZATION  01/01/2008    normal coronary    CARPAL TUNNEL RELEASE Right 12/01/2023    Procedure: RELEASE, CARPAL TUNNEL;  Surgeon: Noel Almonte MD;  Location: [facility] OR;  Service: Orthopedics;  Laterality: Right;    CARPAL TUNNEL RELEASE Left 7/18/2024    Procedure: RELEASE, CARPAL TUNNEL;  Surgeon: Noel Almonte MD;  Location: [facility] OR;  Service: Orthopedics;  Laterality: Left;    COLONOSCOPY N/A 04/05/2018    Procedure: COLONOSCOPY;  Surgeon: Chava Ronquillo MD;  Location: [facility] ENDO;  Service:  Endoscopy;  Laterality: N/A;    COLONOSCOPY N/A 2022    Procedure: COLONOSCOPY;  Surgeon: Alix Puente MD;  Location: [facility];  Service: Endoscopy;  Laterality: N/A;    COLONOSCOPY N/A 2023    Procedure: COLONOSCOPY - rule out CMV  Cardiac clearance/Eliquis hold approval received on 23 per Dr. Meade, cardiology.  Note in encounters.  LB;  Surgeon: Daniella Shah MD;  Location: [facility];  Service: Endoscopy;  Laterality: N/A;    ESOPHAGOGASTRODUODENOSCOPY N/A 2024    Procedure: EGD (ESOPHAGOGASTRODUODENOSCOPY) 3/1-pt cleared, ok to hold eliquis for 3 days;  Surgeon: Daniella Shah MD;  Location: [facility];  Service: Endoscopy;  Laterality: N/A;    KIDNEY TRANSPLANT      RETINAL LASER PROCEDURE           Family History:  Family History   Problem Relation Name Age of Onset    Diabetes Mother      Hypertension Mother      Heart failure Mother      Heart failure Father      Kidney disease Sister          ESRD    Diabetes Sister      Diabetes Maternal Grandmother      Cancer Neg Hx         Social History:  Social History     Tobacco Use    Smoking status: Former     Current packs/day: 0.00     Types: Cigarettes     Quit date: 2013     Years since quittin.6     Passive exposure: Past    Smokeless tobacco: Former     Quit date: 2013    Tobacco comments:     used marijuana since [id number], stopped after started dialysis   Substance and Sexual Activity    Alcohol use: No     Alcohol/week: 0.0 standard drinks of alcohol    Drug use: Not Currently     Comment:      Sexual activity: Never        Review of Systems     Review of Systems   Constitutional:  Negative for fever.   HENT:  Negative for sore throat.    Respiratory:  Negative for shortness of breath.    Cardiovascular:  Positive for leg swelling (right side). Negative for chest pain.   Gastrointestinal:  Negative for nausea and vomiting.   Genitourinary:  Negative for dysuria.   Musculoskeletal:  Positive for  arthralgias (Knee pain). Negative for back pain.   Skin:  Negative for rash.   Neurological:  Negative for weakness and headaches.   Hematological:  Does not bruise/bleed easily.   All other systems reviewed and are negative.     Physical Exam     Initial Vitals [01/06/25 1312]   BP Pulse Resp Temp SpO2   119/65 107 20 98.4 °F (36.9 °C) 97 %      MAP       --          Physical Exam  Nursing Notes and Vital Signs Reviewed.  Constitutional: Patient is in no acute distress. Obese  Head: Atraumatic. Normocephalic.  Eyes: PERRL. EOM intact. Conjunctivae are not pale. No scleral icterus.  ENT: Mucous membranes are moist. Oropharynx is clear and symmetric.    Neck: Supple. Full ROM. No lymphadenopathy.  Cardiovascular: Regular rate. Regular rhythm. No murmurs, rubs, or gallops. Distal pulses are 2+ and symmetric.  Pulmonary/Chest: No respiratory distress. Clear to auscultation bilaterally. No wheezing or rales.  Abdominal: Soft and non-distended. There is no tenderness. No rebound, guarding, or rigidity. Good bowel sounds.  Genitourinary: No CVA tenderness  Musculoskeletal: Moves all extremities. No obvious deformities. No pedal edema. Right leg elevated on pillow. No obvious knee effusion.  Skin: Warm and dry. Thickness to BLE.  Neurological:  Alert, awake, and appropriate.  Normal speech.  No acute focal neurological deficits are appreciated.  Psychiatric: Normal affect. Good eye contact. Appropriate in content.     ED Course   Procedures  ED Vital Signs:  Vitals:    01/06/25 1312 01/06/25 1815 01/06/25 1903   BP: 119/65 135/60    Pulse: 107 106    Resp: 20 20 20   Temp: 98.4 °F (36.9 °C)     TempSrc: Oral     SpO2: 97% 99%        Abnormal Lab Results:  Labs Reviewed   CBC W/ AUTO DIFFERENTIAL - Abnormal       Result Value    WBC 2.96 (*)     RBC 4.45 (*)     Hemoglobin 11.1 (*)     Hematocrit 38.7 (*)     MCV 87      MCH 24.9 (*)     MCHC 28.7 (*)     RDW 13.7      Platelets 246      MPV 10.9      Immature Granulocytes  CANCELED      Immature Grans (Abs) CANCELED      nRBC 0      Gran % 67.0      Lymph % 12.0 (*)     Mono % 21.0 (*)     Eosinophil % 0.0      Basophil % 0.0      Platelet Estimate Appears normal      Differential Method Manual     COMPREHENSIVE METABOLIC PANEL - Abnormal    Sodium 144      Potassium 4.5      Chloride 105      CO2 24      Glucose 54 (*)     BUN 42 (*)     Creatinine 2.8 (*)     Calcium 9.9      Total Protein 6.9      Albumin 3.1 (*)     Total Bilirubin 0.5      Alkaline Phosphatase 64      AST 19      ALT 10      eGFR 23 (*)     Anion Gap 15     APTT - Abnormal    aPTT 35.0 (*)    PROTIME-INR    Prothrombin Time 12.4      INR 1.1     URINALYSIS, REFLEX TO URINE CULTURE    Specimen UA Urine, Clean Catch      Color, UA Yellow      Appearance, UA Clear      pH, UA 6.0      Specific Gravity, UA 1.015      Protein, UA Negative      Glucose, UA Negative      Ketones, UA Negative      Bilirubin (UA) Negative      Occult Blood UA Negative      Nitrite, UA Negative      Urobilinogen, UA Negative      Leukocytes, UA Negative      Narrative:     Specimen Source->Urine        All Lab Results:  Results for orders placed or performed during the hospital encounter of 01/06/25   Comprehensive metabolic panel    Collection Time: 01/06/25  3:06 PM   Result Value Ref Range    Sodium 144 136 - 145 mmol/L    Potassium 4.5 3.5 - 5.1 mmol/L    Chloride 105 95 - 110 mmol/L    CO2 24 23 - 29 mmol/L    Glucose 54 (L) 70 - 110 mg/dL    BUN 42 (H) 8 - 23 mg/dL    Creatinine 2.8 (H) 0.5 - 1.4 mg/dL    Calcium 9.9 8.7 - 10.5 mg/dL    Total Protein 6.9 6.0 - 8.4 g/dL    Albumin 3.1 (L) 3.5 - 5.2 g/dL    Total Bilirubin 0.5 0.1 - 1.0 mg/dL    Alkaline Phosphatase 64 40 - 150 U/L    AST 19 10 - 40 U/L    ALT 10 10 - 44 U/L    eGFR 23 (A) >60 mL/min/1.73 m^2    Anion Gap 15 8 - 16 mmol/L   Protime-INR    Collection Time: 01/06/25  3:06 PM   Result Value Ref Range    Prothrombin Time 12.4 9.0 - 12.5 sec    INR 1.1 0.8 - 1.2   APTT     Collection Time: 01/06/25  3:06 PM   Result Value Ref Range    aPTT 35.0 (H) 21.0 - 32.0 sec   CBC auto differential    Collection Time: 01/06/25  3:14 PM   Result Value Ref Range    WBC 2.96 (L) 3.90 - 12.70 K/uL    RBC 4.45 (L) 4.60 - 6.20 M/uL    Hemoglobin 11.1 (L) 14.0 - 18.0 g/dL    Hematocrit 38.7 (L) 40.0 - 54.0 %    MCV 87 82 - 98 fL    MCH 24.9 (L) 27.0 - 31.0 pg    MCHC 28.7 (L) 32.0 - 36.0 g/dL    RDW 13.7 11.5 - 14.5 %    Platelets 246 150 - 450 K/uL    MPV 10.9 9.2 - 12.9 fL    Immature Granulocytes CANCELED 0.0 - 0.5 %    Immature Grans (Abs) CANCELED 0.00 - 0.04 K/uL    nRBC 0 0 /100 WBC    Gran % 67.0 38.0 - 73.0 %    Lymph % 12.0 (L) 18.0 - 48.0 %    Mono % 21.0 (H) 4.0 - 15.0 %    Eosinophil % 0.0 0.0 - 8.0 %    Basophil % 0.0 0.0 - 1.9 %    Platelet Estimate Appears normal     Differential Method Manual    Urinalysis, Reflex to Urine Culture Urine, Clean Catch    Collection Time: 01/06/25  3:15 PM    Specimen: Urine   Result Value Ref Range    Specimen UA Urine, Clean Catch     Color, UA Yellow Yellow, Straw, Nikki    Appearance, UA Clear Clear    pH, UA 6.0 5.0 - 8.0    Specific Gravity, UA 1.015 1.005 - 1.030    Protein, UA Negative Negative    Glucose, UA Negative Negative    Ketones, UA Negative Negative    Bilirubin (UA) Negative Negative    Occult Blood UA Negative Negative    Nitrite, UA Negative Negative    Urobilinogen, UA Negative <2.0 EU/dL    Leukocytes, UA Negative Negative     *Note: Due to a large number of results and/or encounters for the requested time period, some results have not been displayed. A complete set of results can be found in Results Review.        Imaging Results:  Imaging Results              X-Ray Knee 3 View Right (Final result)  Result time 01/06/25 19:35:54      Final result by Favian Anaya MD (01/06/25 19:35:54)                   Impression:     As above.  Further evaluation as warranted.    Finalized on: 1/6/2025 7:35 PM By:  Favina Anaya MD  [facility]#  [id number]      2025-01-06 19:37:54.698    [facility]               Narrative:    EXAM:  XR KNEE 3 VIEW RIGHT    CLINICAL HISTORY: Pain.    FINDINGS:  Soft tissue swelling.  No acute displaced fracture.  Joint alignment is anatomic.  Small joint effusion.  Moderate degenerative changes.                                         US Lower Extremity Veins Right (Final result)  Result time 01/06/25 14:31:58      Final result by Joe Leach MD (01/06/25 14:31:58)                   Impression:      No evidence of deep venous thrombosis in the right lower extremity.      Electronically signed by: Joe Leach MD  Date:    01/06/2025  Time:    14:31               Narrative:    EXAMINATION:  US LOWER EXTREMITY VEINS RIGHT    CLINICAL HISTORY:  Other specified soft tissue disorders    TECHNIQUE:  Duplex and color flow Doppler evaluation and graded compression of the right lower extremity veins was performed.    COMPARISON:  November 30, 2015    FINDINGS:  Right thigh veins: The common femoral, femoral, popliteal, upper greater saphenous, and deep femoral veins are patent and free of thrombus. The veins are normally compressible and have normal phasic flow and augmentation response.    Right calf veins: The visualized calf veins are patent.    Contralateral CFV: The contralateral (left) common femoral vein is patent and free of thrombus.    Miscellaneous: Mild calf edema.                                       The Emergency Provider reviewed the vital signs and test results, which are outlined above.     ED Discussion     7:30 PM: Pt's daughter mentions the pt had N/V PTA and is concerned.    8:00 PM: Dr. Owusu transfers care of patient to Dr. Fried pending lab results.    8:40 PM: Reassessed pt at this time. Patient has had no N/V during his stay in our ED. He states pain has improved with oral norco. Bilateral DP pulses are dopplerable. Discussed with pt all pertinent ED information and results. Discussed pt dx and plan of tx.  Discussed plans to f/u with Dr. Almonte with Orthopedics as well as his Nephrologist and Cardiologist. Gave return to the ED instructions. All questions and concerns were addressed at this time. Pt expresses understanding of information and instructions, and is comfortable with plan to discharge. Pt is stable for discharge.    I discussed with patient and/or family/caretaker that evaluation in the ED does not suggest any emergent or life threatening medical conditions requiring immediate intervention beyond what was provided in the ED, and I believe patient is safe for discharge.  Regardless, an unremarkable evaluation in the ED does not preclude the development or presence of a serious of life threatening condition. As such, patient was instructed to return immediately for any worsening or change in current symptoms.         Medical Decision Making  69-year-old male presents with right knee pain and leg swelling.  X-ray shows no acute fracture or dislocation with a small joint effusion.  Nothing to indicate septic joint on exam, afebrile.  Ultrasound negative for DVT.  Distal pulses intact, nothing to indicate arterial occlusion.  Patient was placed in knee immobilizer and instructed to follow up with Dr. Almonte, orthopedic surgery.  He has CKD in his currently on Lasix for swelling.  Instructed to follow up with his nephrologist and cardiologist regarding his kidney function and his leg swelling.      Amount and/or Complexity of Data Reviewed  Independent Historian: caregiver     Details: Pt's daughter at bedside  Labs: ordered. Decision-making details documented in ED Course.  Radiology: ordered. Decision-making details documented in ED Course.    Risk  Prescription drug management.       Additional MDM:   Differential Diagnosis:   Osteoarthritis, DVT, arterial occlusion, septic joint             ED Medication(s):  Medications   HYDROcodone-acetaminophen  mg per tablet 1 tablet (1 tablet Oral Given 1/6/25  1903)   ondansetron injection 4 mg (4 mg Intravenous Given 1/6/25 2015)       Discharge Medication List as of 1/6/2025  8:46 PM        START taking these medications    Details   HYDROcodone-acetaminophen (NORCO) 5-325 mg per tablet Take 1 tablet by mouth every 6 (six) hours as needed for Pain., Starting Mon 1/6/2025, Print      ondansetron (ZOFRAN-ODT) 4 MG TbDL Take 1 tablet (4 mg total) by mouth every 8 (eight) hours as needed (Nausea/vomiting)., Starting Mon 1/6/2025, Print              Follow-up Information       Valery Caal MD In 1 week.    Specialty: Family Medicine  Contact information:  [address] [place] [address] DR Marlena HERNANDEZ [address]  [phone]               [facility] - Emergency Dept..    Specialty: Emergency Medicine  Why: As needed, If symptoms worsen  Contact information:  [address] [facility] [id number] [phone]             Noel Almonte MD In 2 days.    Specialties: Hand Surgery, Orthopedic Surgery  Why: right knee pain  Contact information:  [address] [place] [address] [address] Karlos Carver [address]  [facility] [address]  [phone]               Micah Muhammad MD In 2 days.    Specialties: Cardiology, Internal Medicine  Why: leg swelling  Contact information:  [address]  [place] [address]  [phone]               Hany Gonsalez MD In 2 days.    Specialty: Nephrology  Why: kidney disease  Contact information:  [address] [facility] [place] [id number]  [phone]                                 Scribe Attestation:   Scribe #1: I performed the above scribed service and the documentation accurately describes the services I performed. I attest to the accuracy of the note.     Attending:   Physician Attestation Statement for Scribe #1: I, Dianne Owusu MD, personally performed the services described in this documentation, as scribed by Enedelia Loving, in my presence, and it is both accurate and complete.       Scribe Attestation:   Scribe #2: I  performed the above scribed service and the documentation accurately describes the services I performed. I attest to the accuracy of the note.    Attending Attestation:           Physician Attestation for Scribe:    Physician Attestation Statement for Scribe #2: I, Jaymie Fried DO, reviewed documentation, as scribed by Fransico Cesar in my presence, and it is both accurate and complete. I also acknowledge and confirm the content of the note done by Vivekibe #1.           Clinical Impression       ICD-10-CM ICD-9-CM   1. Osteoarthritis of right knee, unspecified osteoarthritis type  M17.11 715.96   2. Right leg swelling  M79.89 729.81   3. Pain  R52 780.96   4. Nausea and vomiting, unspecified vomiting type  R11.2 787.01   5. Chronic kidney disease, unspecified CKD stage  N18.9 585.9   6. Leg swelling  M79.89 729.81       Disposition:   Disposition: Discharged  Condition: Stable        Jaymie Fried DO  01/10/25 1309       Jaymie Fried DO  01/10/25 1309

## 2025-01-09 ENCOUNTER — PATIENT MESSAGE (OUTPATIENT)
Dept: INFUSION THERAPY | Facility: HOSPITAL | Age: 70
End: 2025-01-09
Payer: COMMERCIAL

## 2025-01-12 ENCOUNTER — HOSPITAL ENCOUNTER (INPATIENT)
Facility: HOSPITAL | Age: 70
LOS: 17 days | Discharge: NURSING, REHAB OR LONG-TERM CARE | DRG: 699 | End: 2025-01-29
Attending: EMERGENCY MEDICINE | Admitting: EMERGENCY MEDICINE
Payer: COMMERCIAL

## 2025-01-12 DIAGNOSIS — M25.461 EFFUSION OF RIGHT KNEE: ICD-10-CM

## 2025-01-12 DIAGNOSIS — R07.9 CHEST PAIN: ICD-10-CM

## 2025-01-12 DIAGNOSIS — Z94.0 HISTORY OF KIDNEY TRANSPLANT: ICD-10-CM

## 2025-01-12 DIAGNOSIS — N18.4 CKD (CHRONIC KIDNEY DISEASE) STAGE 4, GFR 15-29 ML/MIN: ICD-10-CM

## 2025-01-12 DIAGNOSIS — M17.11 OSTEOARTHRITIS OF RIGHT KNEE, UNSPECIFIED OSTEOARTHRITIS TYPE: ICD-10-CM

## 2025-01-12 DIAGNOSIS — R07.9 CHEST PAIN AT REST: ICD-10-CM

## 2025-01-12 DIAGNOSIS — I47.20 V-TACH: ICD-10-CM

## 2025-01-12 DIAGNOSIS — R53.1 WEAKNESS: ICD-10-CM

## 2025-01-12 DIAGNOSIS — D64.9 ACUTE ON CHRONIC ANEMIA: ICD-10-CM

## 2025-01-12 DIAGNOSIS — Z79.899 IMMUNOSUPPRESSION DUE TO DRUG THERAPY: ICD-10-CM

## 2025-01-12 DIAGNOSIS — R11.10 VOMITING AND DIARRHEA: ICD-10-CM

## 2025-01-12 DIAGNOSIS — D84.821 IMMUNOSUPPRESSION DUE TO DRUG THERAPY: ICD-10-CM

## 2025-01-12 DIAGNOSIS — Z79.01 CHRONIC ANTICOAGULATION: ICD-10-CM

## 2025-01-12 DIAGNOSIS — K92.1 GASTROINTESTINAL HEMORRHAGE WITH MELENA: Primary | ICD-10-CM

## 2025-01-12 DIAGNOSIS — R33.9 URINARY RETENTION: ICD-10-CM

## 2025-01-12 DIAGNOSIS — R19.7 VOMITING AND DIARRHEA: ICD-10-CM

## 2025-01-12 DIAGNOSIS — N17.9 ACUTE KIDNEY INJURY SUPERIMPOSED ON CHRONIC KIDNEY DISEASE: ICD-10-CM

## 2025-01-12 DIAGNOSIS — E86.1 INTRAVASCULAR VOLUME DEPLETION: ICD-10-CM

## 2025-01-12 DIAGNOSIS — E87.5 ACUTE HYPERKALEMIA: ICD-10-CM

## 2025-01-12 DIAGNOSIS — N18.9 ACUTE KIDNEY INJURY SUPERIMPOSED ON CHRONIC KIDNEY DISEASE: ICD-10-CM

## 2025-01-12 DIAGNOSIS — E87.5 HYPERKALEMIA: ICD-10-CM

## 2025-01-12 DIAGNOSIS — E83.52 HYPERCALCEMIA: ICD-10-CM

## 2025-01-12 LAB
ABO + RH BLD: NORMAL
ALBUMIN SERPL BCP-MCNC: 2.3 G/DL (ref 3.5–5.2)
ALP SERPL-CCNC: 96 U/L (ref 40–150)
ALT SERPL W/O P-5'-P-CCNC: 11 U/L (ref 10–44)
ANION GAP SERPL CALC-SCNC: 13 MMOL/L (ref 8–16)
ANION GAP SERPL CALC-SCNC: 13 MMOL/L (ref 8–16)
AST SERPL-CCNC: 17 U/L (ref 10–40)
BACTERIA #/AREA URNS HPF: ABNORMAL /HPF
BASOPHILS # BLD AUTO: ABNORMAL K/UL (ref 0–0.2)
BASOPHILS NFR BLD: 0 % (ref 0–1.9)
BASOPHILS NFR BLD: 0 % (ref 0–1.9)
BILIRUB SERPL-MCNC: 0.5 MG/DL (ref 0.1–1)
BILIRUB UR QL STRIP: NEGATIVE
BLD GP AB SCN CELLS X3 SERPL QL: NORMAL
BNP SERPL-MCNC: 305 PG/ML (ref 0–99)
BUN SERPL-MCNC: 80 MG/DL (ref 8–23)
BUN SERPL-MCNC: 82 MG/DL (ref 8–23)
C DIFF GDH STL QL: NEGATIVE
C DIFF TOX A+B STL QL IA: NEGATIVE
CALCIUM SERPL-MCNC: 9.6 MG/DL (ref 8.7–10.5)
CALCIUM SERPL-MCNC: 9.8 MG/DL (ref 8.7–10.5)
CHLORIDE SERPL-SCNC: 105 MMOL/L (ref 95–110)
CHLORIDE SERPL-SCNC: 105 MMOL/L (ref 95–110)
CK SERPL-CCNC: 55 U/L (ref 20–200)
CLARITY UR: ABNORMAL
CO2 SERPL-SCNC: 21 MMOL/L (ref 23–29)
CO2 SERPL-SCNC: 23 MMOL/L (ref 23–29)
COLOR UR: YELLOW
CREAT SERPL-MCNC: 4.3 MG/DL (ref 0.5–1.4)
CREAT SERPL-MCNC: 4.3 MG/DL (ref 0.5–1.4)
DACRYOCYTES BLD QL SMEAR: ABNORMAL
DACRYOCYTES BLD QL SMEAR: ABNORMAL
DIFFERENTIAL METHOD BLD: ABNORMAL
DIFFERENTIAL METHOD BLD: ABNORMAL
EOSINOPHIL # BLD AUTO: ABNORMAL K/UL (ref 0–0.5)
EOSINOPHIL NFR BLD: 0 % (ref 0–8)
EOSINOPHIL NFR BLD: 1 % (ref 0–8)
ERYTHROCYTE [DISTWIDTH] IN BLOOD BY AUTOMATED COUNT: 13.8 % (ref 11.5–14.5)
ERYTHROCYTE [DISTWIDTH] IN BLOOD BY AUTOMATED COUNT: 13.9 % (ref 11.5–14.5)
EST. GFR  (NO RACE VARIABLE): 14 ML/MIN/1.73 M^2
EST. GFR  (NO RACE VARIABLE): 14 ML/MIN/1.73 M^2
GIANT PLATELETS BLD QL SMEAR: PRESENT
GIANT PLATELETS BLD QL SMEAR: PRESENT
GLUCOSE SERPL-MCNC: 146 MG/DL (ref 70–110)
GLUCOSE SERPL-MCNC: 246 MG/DL (ref 70–110)
GLUCOSE UR QL STRIP: NEGATIVE
HCT VFR BLD AUTO: 29.1 % (ref 40–54)
HCT VFR BLD AUTO: 32.6 % (ref 40–54)
HGB BLD-MCNC: 8.6 G/DL (ref 14–18)
HGB BLD-MCNC: 9.5 G/DL (ref 14–18)
HGB UR QL STRIP: NEGATIVE
HYALINE CASTS #/AREA URNS LPF: 0 /LPF
IMM GRANULOCYTES # BLD AUTO: ABNORMAL K/UL (ref 0–0.04)
IMM GRANULOCYTES # BLD AUTO: ABNORMAL K/UL (ref 0–0.04)
IMM GRANULOCYTES NFR BLD AUTO: ABNORMAL % (ref 0–0.5)
IMM GRANULOCYTES NFR BLD AUTO: ABNORMAL % (ref 0–0.5)
KETONES UR QL STRIP: NEGATIVE
LACTATE SERPL-SCNC: 1.1 MMOL/L (ref 0.5–2.2)
LEUKOCYTE ESTERASE UR QL STRIP: NEGATIVE
LIPASE SERPL-CCNC: 4 U/L (ref 4–60)
LYMPHOCYTES # BLD AUTO: ABNORMAL K/UL (ref 1–4.8)
LYMPHOCYTES NFR BLD: 24 % (ref 18–48)
LYMPHOCYTES NFR BLD: 27 % (ref 18–48)
MAGNESIUM SERPL-MCNC: 2.9 MG/DL (ref 1.6–2.6)
MCH RBC QN AUTO: 24.8 PG (ref 27–31)
MCH RBC QN AUTO: 25.1 PG (ref 27–31)
MCHC RBC AUTO-ENTMCNC: 29.1 G/DL (ref 32–36)
MCHC RBC AUTO-ENTMCNC: 29.6 G/DL (ref 32–36)
MCV RBC AUTO: 85 FL (ref 82–98)
MCV RBC AUTO: 85 FL (ref 82–98)
MICROSCOPIC COMMENT: ABNORMAL
MONOCYTES # BLD AUTO: ABNORMAL K/UL (ref 0.3–1)
MONOCYTES NFR BLD: 26 % (ref 4–15)
MONOCYTES NFR BLD: 33 % (ref 4–15)
NEUTROPHILS NFR BLD: 42 % (ref 38–73)
NEUTROPHILS NFR BLD: 47 % (ref 38–73)
NITRITE UR QL STRIP: NEGATIVE
NRBC BLD-RTO: 0 /100 WBC
NRBC BLD-RTO: 0 /100 WBC
OB PNL STL: POSITIVE
OHS QRS DURATION: 148 MS
OHS QTC CALCULATION: 544 MS
OVALOCYTES BLD QL SMEAR: ABNORMAL
PH UR STRIP: 6 [PH] (ref 5–8)
PLATELET # BLD AUTO: 223 K/UL (ref 150–450)
PLATELET # BLD AUTO: 245 K/UL (ref 150–450)
PLATELET BLD QL SMEAR: ABNORMAL
PLATELET BLD QL SMEAR: ABNORMAL
PMV BLD AUTO: 10.1 FL (ref 9.2–12.9)
PMV BLD AUTO: 10.2 FL (ref 9.2–12.9)
POCT GLUCOSE: 159 MG/DL (ref 70–110)
POCT GLUCOSE: 265 MG/DL (ref 70–110)
POCT GLUCOSE: 321 MG/DL (ref 70–110)
POCT GLUCOSE: 362 MG/DL (ref 70–110)
POIKILOCYTOSIS BLD QL SMEAR: SLIGHT
POIKILOCYTOSIS BLD QL SMEAR: SLIGHT
POTASSIUM SERPL-SCNC: 5.4 MMOL/L (ref 3.5–5.1)
POTASSIUM SERPL-SCNC: 6.7 MMOL/L (ref 3.5–5.1)
PROCALCITONIN SERPL IA-MCNC: 2.01 NG/ML
PROT SERPL-MCNC: 6.2 G/DL (ref 6–8.4)
PROT UR QL STRIP: ABNORMAL
RBC # BLD AUTO: 3.42 M/UL (ref 4.6–6.2)
RBC # BLD AUTO: 3.83 M/UL (ref 4.6–6.2)
RBC #/AREA URNS HPF: 3 /HPF (ref 0–4)
RV AG STL QL IA.RAPID: NEGATIVE
SCHISTOCYTES BLD QL SMEAR: PRESENT
SCHISTOCYTES BLD QL SMEAR: PRESENT
SODIUM SERPL-SCNC: 139 MMOL/L (ref 136–145)
SODIUM SERPL-SCNC: 141 MMOL/L (ref 136–145)
SP GR UR STRIP: 1.01 (ref 1–1.03)
SPECIMEN OUTDATE: NORMAL
SQUAMOUS #/AREA URNS HPF: 2 /HPF
URN SPEC COLLECT METH UR: ABNORMAL
UROBILINOGEN UR STRIP-ACNC: NEGATIVE EU/DL
WBC # BLD AUTO: 3.14 K/UL (ref 3.9–12.7)
WBC # BLD AUTO: 3.67 K/UL (ref 3.9–12.7)
WBC #/AREA URNS HPF: 11 /HPF (ref 0–5)

## 2025-01-12 PROCEDURE — 87449 NOS EACH ORGANISM AG IA: CPT | Mod: 91 | Performed by: EMERGENCY MEDICINE

## 2025-01-12 PROCEDURE — 93010 ELECTROCARDIOGRAM REPORT: CPT | Mod: ,,, | Performed by: INTERNAL MEDICINE

## 2025-01-12 PROCEDURE — 89055 LEUKOCYTE ASSESSMENT FECAL: CPT | Performed by: EMERGENCY MEDICINE

## 2025-01-12 PROCEDURE — 87045 FECES CULTURE AEROBIC BACT: CPT | Performed by: EMERGENCY MEDICINE

## 2025-01-12 PROCEDURE — 87086 URINE CULTURE/COLONY COUNT: CPT | Performed by: EMERGENCY MEDICINE

## 2025-01-12 PROCEDURE — 94640 AIRWAY INHALATION TREATMENT: CPT

## 2025-01-12 PROCEDURE — 84145 PROCALCITONIN (PCT): CPT | Performed by: EMERGENCY MEDICINE

## 2025-01-12 PROCEDURE — 93005 ELECTROCARDIOGRAM TRACING: CPT

## 2025-01-12 PROCEDURE — 80053 COMPREHEN METABOLIC PANEL: CPT | Performed by: EMERGENCY MEDICINE

## 2025-01-12 PROCEDURE — 87425 ROTAVIRUS AG IA: CPT | Performed by: EMERGENCY MEDICINE

## 2025-01-12 PROCEDURE — 85007 BL SMEAR W/DIFF WBC COUNT: CPT | Performed by: EMERGENCY MEDICINE

## 2025-01-12 PROCEDURE — 99291 CRITICAL CARE FIRST HOUR: CPT

## 2025-01-12 PROCEDURE — 83880 ASSAY OF NATRIURETIC PEPTIDE: CPT | Performed by: EMERGENCY MEDICINE

## 2025-01-12 PROCEDURE — 83993 ASSAY FOR CALPROTECTIN FECAL: CPT | Performed by: EMERGENCY MEDICINE

## 2025-01-12 PROCEDURE — 81000 URINALYSIS NONAUTO W/SCOPE: CPT | Performed by: EMERGENCY MEDICINE

## 2025-01-12 PROCEDURE — 86850 RBC ANTIBODY SCREEN: CPT | Performed by: EMERGENCY MEDICINE

## 2025-01-12 PROCEDURE — 63600175 PHARM REV CODE 636 W HCPCS: Performed by: EMERGENCY MEDICINE

## 2025-01-12 PROCEDURE — 83735 ASSAY OF MAGNESIUM: CPT | Performed by: EMERGENCY MEDICINE

## 2025-01-12 PROCEDURE — 25000003 PHARM REV CODE 250: Performed by: EMERGENCY MEDICINE

## 2025-01-12 PROCEDURE — 85027 COMPLETE CBC AUTOMATED: CPT | Performed by: EMERGENCY MEDICINE

## 2025-01-12 PROCEDURE — 87328 CRYPTOSPORIDIUM AG IA: CPT | Performed by: EMERGENCY MEDICINE

## 2025-01-12 PROCEDURE — 21400001 HC TELEMETRY ROOM

## 2025-01-12 PROCEDURE — 87046 STOOL CULTR AEROBIC BACT EA: CPT | Performed by: EMERGENCY MEDICINE

## 2025-01-12 PROCEDURE — 87209 SMEAR COMPLEX STAIN: CPT | Performed by: EMERGENCY MEDICINE

## 2025-01-12 PROCEDURE — 82550 ASSAY OF CK (CPK): CPT | Performed by: EMERGENCY MEDICINE

## 2025-01-12 PROCEDURE — 87324 CLOSTRIDIUM AG IA: CPT | Performed by: EMERGENCY MEDICINE

## 2025-01-12 PROCEDURE — 83605 ASSAY OF LACTIC ACID: CPT | Performed by: EMERGENCY MEDICINE

## 2025-01-12 PROCEDURE — 36415 COLL VENOUS BLD VENIPUNCTURE: CPT | Performed by: EMERGENCY MEDICINE

## 2025-01-12 PROCEDURE — 82272 OCCULT BLD FECES 1-3 TESTS: CPT | Performed by: EMERGENCY MEDICINE

## 2025-01-12 PROCEDURE — 96374 THER/PROPH/DIAG INJ IV PUSH: CPT

## 2025-01-12 PROCEDURE — 99223 1ST HOSP IP/OBS HIGH 75: CPT | Mod: ,,, | Performed by: INTERNAL MEDICINE

## 2025-01-12 PROCEDURE — 96375 TX/PRO/DX INJ NEW DRUG ADDON: CPT

## 2025-01-12 PROCEDURE — 80048 BASIC METABOLIC PNL TOTAL CA: CPT | Performed by: EMERGENCY MEDICINE

## 2025-01-12 PROCEDURE — 87427 SHIGA-LIKE TOXIN AG IA: CPT | Mod: 59 | Performed by: EMERGENCY MEDICINE

## 2025-01-12 PROCEDURE — 94761 N-INVAS EAR/PLS OXIMETRY MLT: CPT

## 2025-01-12 PROCEDURE — 82962 GLUCOSE BLOOD TEST: CPT

## 2025-01-12 PROCEDURE — 25000242 PHARM REV CODE 250 ALT 637 W/ HCPCS: Performed by: EMERGENCY MEDICINE

## 2025-01-12 PROCEDURE — 83690 ASSAY OF LIPASE: CPT | Performed by: EMERGENCY MEDICINE

## 2025-01-12 PROCEDURE — 87040 BLOOD CULTURE FOR BACTERIA: CPT | Mod: 59 | Performed by: EMERGENCY MEDICINE

## 2025-01-12 PROCEDURE — A4216 STERILE WATER/SALINE, 10 ML: HCPCS | Performed by: EMERGENCY MEDICINE

## 2025-01-12 RX ORDER — GLUCAGON 1 MG
1 KIT INJECTION
Status: DISCONTINUED | OUTPATIENT
Start: 2025-01-12 | End: 2025-01-29 | Stop reason: HOSPADM

## 2025-01-12 RX ORDER — IBUPROFEN 200 MG
24 TABLET ORAL
Status: DISCONTINUED | OUTPATIENT
Start: 2025-01-12 | End: 2025-01-29 | Stop reason: HOSPADM

## 2025-01-12 RX ORDER — ACETAMINOPHEN 325 MG/1
650 TABLET ORAL EVERY 4 HOURS PRN
Status: DISCONTINUED | OUTPATIENT
Start: 2025-01-12 | End: 2025-01-29 | Stop reason: HOSPADM

## 2025-01-12 RX ORDER — ONDANSETRON 8 MG/1
8 TABLET, ORALLY DISINTEGRATING ORAL EVERY 8 HOURS PRN
Status: DISCONTINUED | OUTPATIENT
Start: 2025-01-12 | End: 2025-01-29 | Stop reason: HOSPADM

## 2025-01-12 RX ORDER — SODIUM CHLORIDE 0.9 % (FLUSH) 0.9 %
10 SYRINGE (ML) INJECTION EVERY 8 HOURS
Status: DISCONTINUED | OUTPATIENT
Start: 2025-01-12 | End: 2025-01-29 | Stop reason: HOSPADM

## 2025-01-12 RX ORDER — NALOXONE HCL 0.4 MG/ML
0.02 VIAL (ML) INJECTION
Status: DISCONTINUED | OUTPATIENT
Start: 2025-01-12 | End: 2025-01-29 | Stop reason: HOSPADM

## 2025-01-12 RX ORDER — ALBUTEROL SULFATE 0.83 MG/ML
10 SOLUTION RESPIRATORY (INHALATION)
Status: COMPLETED | OUTPATIENT
Start: 2025-01-12 | End: 2025-01-12

## 2025-01-12 RX ORDER — TALC
6 POWDER (GRAM) TOPICAL NIGHTLY PRN
Status: DISCONTINUED | OUTPATIENT
Start: 2025-01-12 | End: 2025-01-29 | Stop reason: HOSPADM

## 2025-01-12 RX ORDER — INSULIN ASPART 100 [IU]/ML
0-5 INJECTION, SOLUTION INTRAVENOUS; SUBCUTANEOUS
Status: DISCONTINUED | OUTPATIENT
Start: 2025-01-12 | End: 2025-01-14

## 2025-01-12 RX ORDER — POLYETHYLENE GLYCOL 3350 17 G/17G
17 POWDER, FOR SOLUTION ORAL DAILY PRN
Status: DISCONTINUED | OUTPATIENT
Start: 2025-01-12 | End: 2025-01-29 | Stop reason: HOSPADM

## 2025-01-12 RX ORDER — AMOXICILLIN 250 MG
2 CAPSULE ORAL DAILY PRN
Status: DISCONTINUED | OUTPATIENT
Start: 2025-01-12 | End: 2025-01-29 | Stop reason: HOSPADM

## 2025-01-12 RX ORDER — TACROLIMUS 1 MG/1
5 CAPSULE ORAL 2 TIMES DAILY
Status: DISCONTINUED | OUTPATIENT
Start: 2025-01-12 | End: 2025-01-13

## 2025-01-12 RX ORDER — CALCIUM GLUCONATE 20 MG/ML
1 INJECTION, SOLUTION INTRAVENOUS
Status: COMPLETED | OUTPATIENT
Start: 2025-01-12 | End: 2025-01-12

## 2025-01-12 RX ORDER — ONDANSETRON HYDROCHLORIDE 2 MG/ML
4 INJECTION, SOLUTION INTRAVENOUS EVERY 8 HOURS PRN
Status: DISCONTINUED | OUTPATIENT
Start: 2025-01-12 | End: 2025-01-29 | Stop reason: HOSPADM

## 2025-01-12 RX ORDER — IBUPROFEN 200 MG
16 TABLET ORAL
Status: DISCONTINUED | OUTPATIENT
Start: 2025-01-12 | End: 2025-01-29 | Stop reason: HOSPADM

## 2025-01-12 RX ORDER — MYCOPHENOLATE MOFETIL 500 MG/1
500 TABLET ORAL 2 TIMES DAILY
Status: DISCONTINUED | OUTPATIENT
Start: 2025-01-12 | End: 2025-01-29 | Stop reason: HOSPADM

## 2025-01-12 RX ORDER — MIDODRINE HYDROCHLORIDE 5 MG/1
5 TABLET ORAL ONCE
Status: COMPLETED | OUTPATIENT
Start: 2025-01-12 | End: 2025-01-12

## 2025-01-12 RX ADMIN — SODIUM ZIRCONIUM CYCLOSILICATE 10 G: 5 POWDER, FOR SUSPENSION ORAL at 07:01

## 2025-01-12 RX ADMIN — Medication 10 ML: at 10:01

## 2025-01-12 RX ADMIN — CALCIUM GLUCONATE 1 G: 20 INJECTION, SOLUTION INTRAVENOUS at 07:01

## 2025-01-12 RX ADMIN — DEXTROSE MONOHYDRATE 50 G: 25 INJECTION, SOLUTION INTRAVENOUS at 07:01

## 2025-01-12 RX ADMIN — TACROLIMUS 5 MG: 1 CAPSULE ORAL at 11:01

## 2025-01-12 RX ADMIN — SODIUM CHLORIDE 500 ML: 9 INJECTION, SOLUTION INTRAVENOUS at 10:01

## 2025-01-12 RX ADMIN — INSULIN ASPART 2 UNITS: 100 INJECTION, SOLUTION INTRAVENOUS; SUBCUTANEOUS at 10:01

## 2025-01-12 RX ADMIN — INSULIN ASPART 5 UNITS: 100 INJECTION, SOLUTION INTRAVENOUS; SUBCUTANEOUS at 06:01

## 2025-01-12 RX ADMIN — MIDODRINE HYDROCHLORIDE 5 MG: 5 TABLET ORAL at 10:01

## 2025-01-12 RX ADMIN — HUMAN INSULIN 8 UNITS: 100 INJECTION, SOLUTION SUBCUTANEOUS at 07:01

## 2025-01-12 RX ADMIN — MYCOPHENOLATE MOFETIL 500 MG: 500 TABLET, FILM COATED ORAL at 11:01

## 2025-01-12 RX ADMIN — ALBUTEROL SULFATE 10 MG: 2.5 SOLUTION RESPIRATORY (INHALATION) at 07:01

## 2025-01-12 NOTE — ED PROVIDER NOTES
SCRIBE #1 NOTE: I, Una Hamilton, am scribing for, and in the presence of, Gemma Bright MD. I have scribed the entire note.       History     Chief Complaint   Patient presents with    Fatigue     Family report pt has been sitting in chair for 3 days, incontinent of bowel and urine , kidney transplant 2015, former dialysis, shunt located right arm     Review of patient's allergies indicates:   Allergen Reactions    Lisinopril Other (See Comments)     Other reaction(s):  cough    Actos  [pioglitazone] Other (See Comments)     Other reaction(s): CHF    Metformin Other (See Comments)     Other reaction(s): renal insuff  Other reaction(s): CHF         History of Present Illness     HPI    1/12/2025, 7:18 AM  History obtained from the patient      History of Present Illness: Mitch Whittaker is a 69 y.o. male patient with a PMHx of CHF, anemia, hyperparathyroidism, heart failure, type 2 DM, kidney transplant 2015, HLD, HTN, CKD, Hep C, and arthritis who presents to the Emergency Department for evaluation of weakness in his knees which onset gradually 1 week ago. According to nursing staff, family reported that the pt has been sitting in his chair for the last 3 days. Pt reports that he has had two episodes of diarrhea since yesterday, but due to the increasing weakness in his knees he was unable to get up from his chair. Pt normally ambulates with assistance from a walker. Symptoms are constant and moderate in severity. No mitigating or exacerbating factors reported. Associated sxs include blood in stool (x4 days) and n/v yesterday, but none today after PO. Patient denies any fever, abdominal pain, appetite changes, SOB, dysuria, and all other sxs at this time. No prior tx. Pt is on Eliquis. No further complaints or concerns at this time.       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 RNA NEGATIVE 5/2015, 2021, 2022    History of colon polyps     HPTH (hyperparathyroidism)     Hyperlipidemia     Hypertension associated with stage 3 chronic kidney disease due to type 2 diabetes mellitus     LBBB (left bundle branch block) 12/20/2021    Morbid obesity with BMI of 45.0-49.9, adult     Nephrolithiasis 6/7/2013    PCO (posterior capsular opacification), left 03/04/2019    Proteinuria     resolved s/p transplant    S/P kidney transplant     Sleep apnea     Type 2 diabetes, uncontrolled, with retinopathy     Type II diabetes mellitus with renal manifestations        Past Surgical History:  Past Surgical History:   Procedure Laterality Date    CARDIAC CATHETERIZATION  01/01/2008    normal coronary    CARPAL TUNNEL RELEASE Right 12/01/2023    Procedure: RELEASE, CARPAL TUNNEL;  Surgeon: Noel Almonte MD;  Location: [facility] OR;  Service: Orthopedics;  Laterality: Right;    CARPAL TUNNEL RELEASE Left 7/18/2024    Procedure: RELEASE, CARPAL TUNNEL;  Surgeon: Noel Almonte MD;  Location: [facility] OR;  Service: Orthopedics;  Laterality: Left;    COLONOSCOPY N/A 04/05/2018    Procedure: COLONOSCOPY;  Surgeon: Chava Ronquillo MD;  Location: [facility] ENDO;  Service: Endoscopy;  Laterality: N/A;    COLONOSCOPY N/A  2022    Procedure: COLONOSCOPY;  Surgeon: Alix Puente MD;  Location: [facility];  Service: Endoscopy;  Laterality: N/A;    COLONOSCOPY N/A 2023    Procedure: COLONOSCOPY - rule out CMV  Cardiac clearance/Eliquis hold approval received on 23 per Dr. Meade, cardiology.  Note in encounters.  LB;  Surgeon: Daniella Shah MD;  Location: [facility] ENDO;  Service: Endoscopy;  Laterality: N/A;    ESOPHAGOGASTRODUODENOSCOPY N/A 2024    Procedure: EGD (ESOPHAGOGASTRODUODENOSCOPY) 3/1-pt cleared, ok to hold eliquis for 3 days;  Surgeon: Daniella Shah MD;  Location: [facility];  Service: Endoscopy;  Laterality: N/A;    KIDNEY TRANSPLANT      RETINAL LASER PROCEDURE           Family History:  Family History   Problem Relation Name Age of Onset    Diabetes Mother      Hypertension Mother      Heart failure Mother      Heart failure Father      Kidney disease Sister          ESRD    Diabetes Sister      Diabetes Maternal Grandmother      Cancer Neg Hx         Social History:  Social History     Tobacco Use    Smoking status: Former     Current packs/day: 0.00     Types: Cigarettes     Quit date: 2013     Years since quittin.6     Passive exposure: Past    Smokeless tobacco: Former     Quit date: 2013    Tobacco comments:     used marijuana since [id number], stopped after started dialysis   Substance and Sexual Activity    Alcohol use: No     Alcohol/week: 0.0 standard drinks of alcohol    Drug use: Not Currently     Comment:      Sexual activity: Never        Review of Systems     Review of Systems   Constitutional:  Negative for appetite change and fever.   HENT:  Negative for sore throat.    Respiratory:  Negative for shortness of breath.    Cardiovascular:  Negative for chest pain.   Gastrointestinal:  Positive for blood in stool (x4 days), diarrhea (twice since yesterday), nausea (once yesterday) and vomiting (once yesterday). Negative for abdominal pain.   Genitourinary:   Negative for dysuria.   Musculoskeletal:  Negative for back pain.   Skin:  Negative for rash.   Neurological:  Positive for weakness.   Hematological:  Does not bruise/bleed easily.   All other systems reviewed and are negative.     Physical Exam     Initial Vitals [01/12/25 0452]   BP Pulse Resp Temp SpO2   105/65 (!) 115 18 97.6 °F (36.4 °C) 100 %      MAP       --          Physical Exam  Nursing Notes and Vital Signs Reviewed.  Constitutional: Patient is in no obvious distress. Chronically ill-appearing.  Head: Atraumatic. Normocephalic.  Eyes: PERRL. EOM intact. Conjunctivae are not pale. No scleral icterus.  ENT: Mucous membranes are moist. Oropharynx is clear and symmetric.    Neck: Supple. Full ROM.   Cardiovascular: Tachycardiac. Regular rhythm. No murmurs, rubs, or gallops. Distal pulses are 2+ and symmetric.  Pulmonary/Chest: No respiratory distress. Clear to auscultation bilaterally. No wheezing or rales.  Abdominal: Soft and non-distended. Morbidly obese. Mild tenderness to R lower abdominal region. No rebound, guarding, or rigidity. Good bowel sounds.  Rectal: There is no tenderness. No masses or hemorrhoids. Normal sphincter tone. Trace black stool on digital rectal exam.  Musculoskeletal: Moves all extremities. No obvious deformities. Chronic appearing edema to BLE. No calf tenderness.  Skin: Warm and dry. Chronic venous stasis dermatitis changes to bilateral lower extremities.  Neurological:  Alert, awake, and appropriate.  Normal speech.  No acute focal neurological deficits are appreciated.  Psychiatric: Normal affect. Good eye contact. Appropriate in content.     ED Course   Critical Care    Date/Time: 1/12/2025 11:41 AM    Performed by: Gemma Bright MD  Authorized by: Gemma Bright MD  Direct patient critical care time: 45 minutes  Additional history critical care time: 6 minutes  Ordering / reviewing critical care time: 6 minutes  Documentation critical care time: 6 minutes  Consulting  "other physicians critical care time: 8 minutes  Total critical care time (exclusive of procedural time) : 71 minutes  Critical care was necessary to treat or prevent imminent or life-threatening deterioration of the following conditions: renal failure, circulatory failure, metabolic crisis, sepsis and dehydration (hyperkalemia, ANGELITO, Gi bleed).  Critical care was time spent personally by me on the following activities: blood draw for specimens, development of treatment plan with patient or surrogate, discussions with consultants, discussions with primary provider, interpretation of cardiac output measurements, evaluation of patient's response to treatment, examination of patient, obtaining history from patient or surrogate, ordering and performing treatments and interventions, ordering and review of laboratory studies, ordering and review of radiographic studies, review of old charts, re-evaluation of patient's condition and pulse oximetry.        ED Vital Signs:  Vitals:    01/12/25 0452 01/12/25 0619 01/12/25 0630 01/12/25 0703   BP: 105/65   134/63   Pulse: (!) 115  109 108   Resp: 18   (!) 21   Temp: 97.6 °F (36.4 °C)      TempSrc: Oral      SpO2: 100%  100% 100%   Weight:  130.1 kg (286 lb 13.1 oz)     Height: 5' 7" (1.702 m)       01/12/25 0730 01/12/25 0746 01/12/25 0800 01/12/25 0903   BP: (!) 97/58  (!) 125/56 (!) 96/46   Pulse: 109 109 (!) 114 (!) 125   Resp:  18 (!) 22 (!) 24   Temp:       TempSrc:       SpO2: 100% 100% 100% 99%   Weight:       Height:        01/12/25 1022 01/12/25 1023 01/12/25 1102   BP:  (!) 93/52 (!) 112/55   Pulse:  (!) 123 (!) 119   Resp: 19 19 (!) 23   Temp:      TempSrc:      SpO2:  100% 99%   Weight:      Height:          Abnormal Lab Results:  Labs Reviewed   CBC W/ AUTO DIFFERENTIAL - Abnormal       Result Value    WBC 3.67 (*)     RBC 3.83 (*)     Hemoglobin 9.5 (*)     Hematocrit 32.6 (*)     MCV 85      MCH 24.8 (*)     MCHC 29.1 (*)     RDW 13.8      Platelets 245      MPV " 10.2      Immature Granulocytes CANCELED      Immature Grans (Abs) CANCELED      Lymph # CANCELED      Mono # CANCELED      Eos # CANCELED      Baso # CANCELED      nRBC 0      Gran % 47.0      Lymph % 27.0      Mono % 26.0 (*)     Eosinophil % 0.0      Basophil % 0.0      Platelet Estimate Clumped (*)     Poik Slight      Tear Drop Cells Occasional      Schistocytes Present      Large/Giant Platelets Present      Differential Method Manual     COMPREHENSIVE METABOLIC PANEL - Abnormal    Sodium 141      Potassium 6.7 (*)     Chloride 105      CO2 23      Glucose 146 (*)     BUN 82 (*)     Creatinine 4.3 (*)     Calcium 9.8      Total Protein 6.2      Albumin 2.3 (*)     Total Bilirubin 0.5      Alkaline Phosphatase 96      AST 17      ALT 11      eGFR 14 (*)     Anion Gap 13      Narrative:     K critical result(s) called and verbal readback obtained from BETO ROMERO RN by ASHER 01/12/2025 07:23   URINALYSIS, REFLEX TO URINE CULTURE - Abnormal    Specimen UA Urine, Catheterized      Color, UA Yellow      Appearance, UA Hazy (*)     pH, UA 6.0      Specific Gravity, UA 1.015      Protein, UA 1+ (*)     Glucose, UA Negative      Ketones, UA Negative      Bilirubin (UA) Negative      Occult Blood UA Negative      Nitrite, UA Negative      Urobilinogen, UA Negative      Leukocytes, UA Negative      Narrative:     Specimen Source->Urine   PROCALCITONIN - Abnormal    Procalcitonin 2.01 (*)    OCCULT BLOOD X 1, STOOL - Abnormal    Occult Blood Positive (*)    MAGNESIUM - Abnormal    Magnesium 2.9 (*)    B-TYPE NATRIURETIC PEPTIDE - Abnormal     (*)    URINALYSIS MICROSCOPIC - Abnormal    RBC, UA 3      WBC, UA 11 (*)     Bacteria Rare      Squam Epithel, UA 2      Hyaline Casts, UA 0      Microscopic Comment SEE COMMENT      Narrative:     Specimen Source->Urine   BASIC METABOLIC PANEL - Abnormal    Sodium 139      Potassium 5.4 (*)     Chloride 105      CO2 21 (*)     Glucose 246 (*)     BUN 80 (*)     Creatinine  4.3 (*)     Calcium 9.6      Anion Gap 13      eGFR 14 (*)    CBC W/ AUTO DIFFERENTIAL - Abnormal    WBC 3.14 (*)     RBC 3.42 (*)     Hemoglobin 8.6 (*)     Hematocrit 29.1 (*)     MCV 85      MCH 25.1 (*)     MCHC 29.6 (*)     RDW 13.9      Platelets 223      MPV 10.1      Immature Granulocytes CANCELED      Immature Grans (Abs) CANCELED      nRBC 0      Gran % 42.0      Lymph % 24.0      Mono % 33.0 (*)     Eosinophil % 1.0      Basophil % 0.0      Platelet Estimate Clumped (*)     Poik Slight      Ovalocytes Occasional      Tear Drop Cells Occasional      Schistocytes Present      Large/Giant Platelets Present      Differential Method Manual     POCT GLUCOSE - Abnormal    POCT Glucose 159 (*)    POCT GLUCOSE - Abnormal    POCT Glucose 265 (*)    CLOSTRIDIUM DIFFICILE    C. diff Antigen Negative      C difficile Toxins A+B, EIA Negative      Narrative:     If specimen collected today, this will be a community onset  CDIFF case. If specimen cannot be collected by hospital day  3, discontinue test and follow Diarrhea Decision Tree to  determine if a AMISHA CDIFF order is appropriate. The order will  automatically discontinue if specimen not collected within  48 hours.  [email or web address]   CULTURE, BLOOD   CULTURE, BLOOD   CULTURE, STOOL   CULTURE, URINE   ENTEROHEMORRHAGIC E.COLI   LIPASE    Lipase 4     LACTIC ACID, PLASMA    Lactate (Lactic Acid) 1.1     ROTAVIRUS ANTIGEN, STOOL    Rotavirus Negative     CK    CPK 55     WBC, STOOL   CALPROTECTIN, STOOL   GIARDIA/CRYPTOSPORIDIUM (EIA)   STOOL EXAM-OVA,CYSTS,PARASITES   TYPE & SCREEN    Group & Rh B POS      Indirect Jimmy NEG      Specimen Outdate 01/15/2025 23:59     POCT GLUCOSE MONITORING CONTINUOUS        All Lab Results:  Results for orders placed or performed during the hospital encounter of 01/12/25   CBC W/ AUTO DIFFERENTIAL    Collection Time: 01/12/25  6:39 AM   Result Value Ref Range    WBC  3.67 (L) 3.90 - 12.70 K/uL    RBC 3.83 (L) 4.60 - 6.20 M/uL    Hemoglobin 9.5 (L) 14.0 - 18.0 g/dL    Hematocrit 32.6 (L) 40.0 - 54.0 %    MCV 85 82 - 98 fL    MCH 24.8 (L) 27.0 - 31.0 pg    MCHC 29.1 (L) 32.0 - 36.0 g/dL    RDW 13.8 11.5 - 14.5 %    Platelets 245 150 - 450 K/uL    MPV 10.2 9.2 - 12.9 fL    Immature Granulocytes CANCELED 0.0 - 0.5 %    Immature Grans (Abs) CANCELED 0.00 - 0.04 K/uL    Lymph # CANCELED 1.0 - 4.8 K/uL    Mono # CANCELED 0.3 - 1.0 K/uL    Eos # CANCELED 0.0 - 0.5 K/uL    Baso # CANCELED 0.00 - 0.20 K/uL    nRBC 0 0 /100 WBC    Gran % 47.0 38.0 - 73.0 %    Lymph % 27.0 18.0 - 48.0 %    Mono % 26.0 (H) 4.0 - 15.0 %    Eosinophil % 0.0 0.0 - 8.0 %    Basophil % 0.0 0.0 - 1.9 %    Platelet Estimate Clumped (A)     Poik Slight     Tear Drop Cells Occasional     Schistocytes Present     Large/Giant Platelets Present     Differential Method Manual    Comp. Metabolic Panel    Collection Time: 01/12/25  6:39 AM   Result Value Ref Range    Sodium 141 136 - 145 mmol/L    Potassium 6.7 (HH) 3.5 - 5.1 mmol/L    Chloride 105 95 - 110 mmol/L    CO2 23 23 - 29 mmol/L    Glucose 146 (H) 70 - 110 mg/dL    BUN 82 (H) 8 - 23 mg/dL    Creatinine 4.3 (H) 0.5 - 1.4 mg/dL    Calcium 9.8 8.7 - 10.5 mg/dL    Total Protein 6.2 6.0 - 8.4 g/dL    Albumin 2.3 (L) 3.5 - 5.2 g/dL    Total Bilirubin 0.5 0.1 - 1.0 mg/dL    Alkaline Phosphatase 96 40 - 150 U/L    AST 17 10 - 40 U/L    ALT 11 10 - 44 U/L    eGFR 14 (A) >60 mL/min/1.73 m^2    Anion Gap 13 8 - 16 mmol/L   Lipase    Collection Time: 01/12/25  6:39 AM   Result Value Ref Range    Lipase 4 4 - 60 U/L   Lactic acid, plasma    Collection Time: 01/12/25  6:39 AM   Result Value Ref Range    Lactate (Lactic Acid) 1.1 0.5 - 2.2 mmol/L   Procalcitonin    Collection Time: 01/12/25  6:39 AM   Result Value Ref Range    Procalcitonin 2.01 (H) <0.25 ng/mL   Magnesium    Collection Time: 01/12/25  6:39 AM   Result Value Ref Range    Magnesium 2.9 (H) 1.6 - 2.6 mg/dL   CPK     Collection Time: 01/12/25  6:39 AM   Result Value Ref Range    CPK 55 20 - 200 U/L   Brain natriuretic peptide    Collection Time: 01/12/25  6:39 AM   Result Value Ref Range     (H) 0 - 99 pg/mL   Urinalysis, Reflex to Urine Culture Urine, Catheterized    Collection Time: 01/12/25  7:09 AM    Specimen: Urine   Result Value Ref Range    Specimen UA Urine, Catheterized     Color, UA Yellow Yellow, Straw, Nikki    Appearance, UA Hazy (A) Clear    pH, UA 6.0 5.0 - 8.0    Specific Gravity, UA 1.015 1.005 - 1.030    Protein, UA 1+ (A) Negative    Glucose, UA Negative Negative    Ketones, UA Negative Negative    Bilirubin (UA) Negative Negative    Occult Blood UA Negative Negative    Nitrite, UA Negative Negative    Urobilinogen, UA Negative <2.0 EU/dL    Leukocytes, UA Negative Negative   Urinalysis Microscopic    Collection Time: 01/12/25  7:09 AM   Result Value Ref Range    RBC, UA 3 0 - 4 /hpf    WBC, UA 11 (H) 0 - 5 /hpf    Bacteria Rare None-Occ /hpf    Squam Epithel, UA 2 /hpf    Hyaline Casts, UA 0 0-1/lpf /lpf    Microscopic Comment SEE COMMENT    POCT glucose    Collection Time: 01/12/25  7:39 AM   Result Value Ref Range    POCT Glucose 159 (H) 70 - 110 mg/dL   Type & Screen    Collection Time: 01/12/25  7:53 AM   Result Value Ref Range    Group & Rh B POS     Indirect Jimmy NEG     Specimen Outdate 01/15/2025 23:59    Occult blood x 1, stool    Collection Time: 01/12/25  8:00 AM    Specimen: Stool   Result Value Ref Range    Occult Blood Positive (A) Negative   POCT glucose    Collection Time: 01/12/25  8:31 AM   Result Value Ref Range    POCT Glucose 265 (H) 70 - 110 mg/dL   Basic metabolic panel    Collection Time: 01/12/25  8:44 AM   Result Value Ref Range    Sodium 139 136 - 145 mmol/L    Potassium 5.4 (H) 3.5 - 5.1 mmol/L    Chloride 105 95 - 110 mmol/L    CO2 21 (L) 23 - 29 mmol/L    Glucose 246 (H) 70 - 110 mg/dL    BUN 80 (H) 8 - 23 mg/dL    Creatinine 4.3 (H) 0.5 - 1.4 mg/dL    Calcium 9.6  8.7 - 10.5 mg/dL    Anion Gap 13 8 - 16 mmol/L    eGFR 14 (A) >60 mL/min/1.73 m^2   Clostridium difficile EIA    Collection Time: 01/12/25 10:21 AM    Specimen: Stool   Result Value Ref Range    C. diff Antigen Negative Negative    C difficile Toxins A+B, EIA Negative Negative   Rotavirus antigen, stool    Collection Time: 01/12/25 10:21 AM   Result Value Ref Range    Rotavirus Negative Negative   CBC auto differential    Collection Time: 01/12/25 10:56 AM   Result Value Ref Range    WBC 3.14 (L) 3.90 - 12.70 K/uL    RBC 3.42 (L) 4.60 - 6.20 M/uL    Hemoglobin 8.6 (L) 14.0 - 18.0 g/dL    Hematocrit 29.1 (L) 40.0 - 54.0 %    MCV 85 82 - 98 fL    MCH 25.1 (L) 27.0 - 31.0 pg    MCHC 29.6 (L) 32.0 - 36.0 g/dL    RDW 13.9 11.5 - 14.5 %    Platelets 223 150 - 450 K/uL    MPV 10.1 9.2 - 12.9 fL    Immature Granulocytes CANCELED 0.0 - 0.5 %    Immature Grans (Abs) CANCELED 0.00 - 0.04 K/uL    nRBC 0 0 /100 WBC    Gran % 42.0 38.0 - 73.0 %    Lymph % 24.0 18.0 - 48.0 %    Mono % 33.0 (H) 4.0 - 15.0 %    Eosinophil % 1.0 0.0 - 8.0 %    Basophil % 0.0 0.0 - 1.9 %    Platelet Estimate Clumped (A)     Poik Slight     Ovalocytes Occasional     Tear Drop Cells Occasional     Schistocytes Present     Large/Giant Platelets Present     Differential Method Manual      *Note: Due to a large number of results and/or encounters for the requested time period, some results have not been displayed. A complete set of results can be found in Results Review.         Imaging Results:  Imaging Results              X-Ray Chest AP Portable (Final result)  Result time 01/12/25 07:00:01      Final result by Joe Leach MD (01/12/25 07:00:01)                   Impression:      1.  Negative for acute process involving the chest.    2.  Stable findings as noted above.      Electronically signed by: Joe Leach MD  Date:    01/12/2025  Time:    07:00               Narrative:    EXAMINATION:  XR CHEST AP PORTABLE    CLINICAL  HISTORY:  Weakness    COMPARISON:  August 22, 2023    FINDINGS:  The study is lordotic in position.  Chronically low lung volumes with elevation of the right hemidiaphragm.  The lungs are free of new pulmonary opacity.  The cardiac silhouette size is borderline enlarged.  The trachea is midline and the mediastinal width is normal. Negative for focal infiltrate, effusion or pneumothorax. Pulmonary vasculature is normal. Negative for osseous abnormalities. Convex right curvature of the thoracic spine with marginal spondylosis.  Tortuous aorta with calcifications of the aortic knob.  Stable vascular stent in the right axilla.                                       X-Ray Abdomen Flat And Erect (Final result)  Result time 01/12/25 06:56:43      Final result by Joe Leach MD (01/12/25 06:56:43)                   Impression:      1.  Negative for acute process.      Electronically signed by: Joe Leach MD  Date:    01/12/2025  Time:    06:56               Narrative:    EXAMINATION:  XR ABDOMEN FLAT AND ERECT    CLINICAL HISTORY:  Vomiting, unspecified    TECHNIQUE:  Flat and erect AP views of the abdomen were performed.    COMPARISON:  Abdomen and pelvis CT scan from April 14, 2024    FINDINGS:  Normal bowel gas pattern.  Negative for free air.    Stable degenerative changes of the spine and pelvis.  Lung bases are clear.                                       The EKG was ordered, reviewed, and independently interpreted by the ED provider.  Interpretation time: 7:01  Rate: 108 BPM  Rhythm: sinus tachycardia  Interpretation: Right axis deviation. Nonspecific intraventricular block. No STEMI.             The Emergency Provider reviewed the vital signs and test results, which are outlined above.     ED Discussion              Medical Decision Making  DDX: 1. Gastroenteritis 2. Dehydration 3. Sepsis    ECG sinus tachycardia, no acute ishcemic changes, chronic bifasicular block, cxr and abd xray are normal, wbc at  baseline, h/h dropped 2 points from 1 week ago, also noted to have potassium 6.7, ANGELITO, heme positive stool, type and screen ordered on eliquis for hx of DVT, hyperkalemia aggressively treated, bmp repeated and potassium level 5.4, patient also given gentle hydration, nephrology, GI consulted and hospital med to admit. His vital signs have remained otherwise stable, mild tachycardia, BP on lower side but responded to fluids.     Amount and/or Complexity of Data Reviewed  External Data Reviewed: labs and notes.  Labs: ordered. Decision-making details documented in ED Course.  Radiology: ordered. Decision-making details documented in ED Course.  ECG/medicine tests: ordered and independent interpretation performed. Decision-making details documented in ED Course.  Discussion of management or test interpretation with external provider(s):   10:05 AM: Discussed pt's case with Dr. Coronado (Nephrology) who agrees with current care and management.    GI notified as well Dr. Puente aware, no further recommendations    10:13 AM: Discussed case with Anai Estes NP (Hospital Medicine). Dr. Medley agrees with current care and management of pt and accepts admission.   Admitting Service:   Admitting Physician: Dr. Medley  Admit to: Inpt med/tele    10:14 AM: Re-evaluated pt. I have discussed test results, shared treatment plan, and the need for admission with patient and family at bedside. Pt and family express understanding at this time and agree with all information. All questions answered. Pt and family have no further questions or concerns at this time. Pt is ready for admit.    Risk  OTC drugs.  Prescription drug management.  Decision regarding hospitalization.  Diagnosis or treatment significantly limited by social determinants of health.                ED Medication(s):  Medications   dextrose 50% injection 50 g (50 g Intravenous Given 1/12/25 0740)     And   dextrose 50% injection 25 g (has no administration in time  range)     And   insulin regular injection 8 Units 0.08 mL (8 Units Intravenous Given 1/12/25 0744)   sodium chloride 0.9% bolus 500 mL 500 mL (500 mLs Intravenous New Bag 1/12/25 1053)   calcium gluconate 1 g in NS IVPB (premixed) (0 g Intravenous Stopped 1/12/25 0743)   sodium zirconium cyclosilicate packet 10 g (10 g Oral Given 1/12/25 0731)   albuterol nebulizer solution 10 mg (10 mg Nebulization Given 1/12/25 0746)   midodrine tablet 5 mg (5 mg Oral Given 1/12/25 1040)       New Prescriptions    No medications on file               Scribe Attestation:   Scribe #1: I performed the above scribed service and the documentation accurately describes the services I performed. I attest to the accuracy of the note.     Attending:   Physician Attestation Statement for Scribe #1: I, Gemma Bright MD, personally performed the services described in this documentation, as scribed by Una Hamilton, in my presence, and it is both accurate and complete.           Clinical Impression       ICD-10-CM ICD-9-CM   1. Gastrointestinal hemorrhage with melena  K92.1 578.1   2. Weakness  R53.1 780.79   3. Vomiting and diarrhea  R11.10 787.03    R19.7 787.91   4. Chronic anticoagulation  Z79.01 V58.61   5. Acute on chronic anemia  D64.9 285.9   6. Acute kidney injury superimposed on chronic kidney disease  N17.9 584.9    N18.9 585.9   7. Acute hyperkalemia  E87.5 276.7   8. History of kidney transplant  Z94.0 V42.0   9. Immunosuppression due to drug therapy  D84.821 V58.69    Z79.899    10. Intravascular volume depletion  E86.1 276.52       Disposition:   Disposition: Admitted  Condition: Gemma Marquez MD  01/12/25 3390

## 2025-01-12 NOTE — CONSULTS
Nephrology Consultation  Consulting Physician:  Jaymie Medley MD   Reason for consultation:  Renal transplant  Informants:  Patient and medical records     History of Present Illness     The patient is a 69 y.o. male with a hx of previous ESRD likely related to diabetes and hypertension that was on dialysis several years prior to receiving a living related kidney transplant from his daughter in 2015.  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  He is followed by Dr. Gonsalez of [facility] seen last on 09/24/2024 at which time his serum creatinine was 2.2.  He was seen in the emergency department on 01/06/2025 complaining of increasing lower extremity edema.  At that time his serum creatinine was 2.8.  He returns to the emergency department 01/12/2025 with persistent lower extremity edema and associated blistering.  He also complains of bilateral knee pain to the point where he is unable to ambulate.  He attributes this to the change in weather.  His admission laboratory reveals a serum creatinine now up to 4.3 with a potassium of 5.4.  Nephrology has been asked to see and evaluate the patient.  As an outpatient he is chronically on Lasix 40 mg twice a day.  He denies the use of nonsteroidal anti-inflammatory drugs.  He denies dysuria hematuria or difficulty voiding.  He is chronically on Entresto.    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 RNA NEGATIVE 2015, ,     History of colon polyps     HPTH (hyperparathyroidism)     Hyperlipidemia     Hypertension associated with stage 3 chronic kidney disease due to type 2 diabetes mellitus     LBBB (left bundle branch block) 2021    Morbid obesity with BMI of 45.0-49.9, adult     Nephrolithiasis 2013    PCO (posterior capsular opacification), left 2019    Proteinuria     resolved s/p transplant    S/P kidney transplant     Sleep apnea     Type 2 diabetes, uncontrolled, with retinopathy     Type II diabetes mellitus with renal manifestations        SocHx:    Social History     Socioeconomic History    Marital status: Legally     Number of children: 2   Occupational History    Occupation: retired     Employer: Retired   Tobacco Use    Smoking status: Former     Current packs/day: 0.00     Types: Cigarettes     Quit date: 2013     Years since quittin.6     Passive exposure: Past    Smokeless tobacco: Former     Quit date: 2013    Tobacco comments:     used marijuana since 5012-4758, stopped after started dialysis   Substance and Sexual Activity    Alcohol use: No     Alcohol/week: 0.0 standard drinks of alcohol    Drug use: Not Currently     Comment:      Sexual activity: Never   Social History Narrative    . Lives with spouse. Has 2 children. Patient retired as  for [employer]. He has been washing cars.     Social Drivers of Health     Financial Resource Strain: Low Risk  (10/2/2020)    Overall Financial Resource Strain (CARDIA)      Difficulty of Paying Living Expenses: Not hard at all   Transportation Needs: No Transportation Needs (10/2/2020)    PRAPARE - Transportation     Lack of Transportation (Medical): No     Lack of Transportation (Non-Medical): No   Stress: No Stress Concern Present (10/2/2020)    [employer] - Occupational Stress Questionnaire     Feeling of Stress : Not at all       FamHx:    Family History   Problem Relation Name Age of Onset    Diabetes Mother      Hypertension Mother      Heart failure Mother      Heart failure Father      Kidney disease Sister          ESRD    Diabetes Sister      Diabetes Maternal Grandmother      Cancer Neg Hx         ROS:      He denies chest pain or shortness of breath.  He mainly complains of bilateral knee pain.  He does have lower extremity edema and blisters that are occurred on both lower extremities more so on the right than the left.  He denies dysuria hematuria or difficulty voiding.     Health Status   Allergies:    is allergic to lisinopril, actos  [pioglitazone], and metformin.    Current medications:     Current Facility-Administered Medications:     acetaminophen tablet 650 mg, 650 mg, Oral, Once PRN, Sal Lopez MD    [COMPLETED] dextrose 50% injection 50 g, 50 g, Intravenous, Once, 50 g at 01/12/25 0740 **AND** dextrose 50% injection 25 g, 25 g, Intravenous, PRN **AND** [COMPLETED] insulin regular injection 8 Units 0.08 mL, 8 Units, Intravenous, Once, Gemma Bright MD, 8 Units at 01/12/25 0744    Current Outpatient Medications:     albuterol (PROVENTIL) 2.5 mg /3 mL (0.083 %) nebulizer solution, Take 3 mLs (2.5 mg total) by nebulization every 4 to 6 hours as needed for Wheezing or Shortness of Breath., Disp: 360 mL, Rfl: 11    albuterol (PROVENTIL/VENTOLIN HFA) 90 mcg/actuation inhaler, Inhale 2 puffs into the lungs every 4 (four) hours as needed for Wheezing or Shortness of Breath., Disp: 8.5 g, Rfl: 11    amitriptyline (ELAVIL) 25 MG  "tablet, Take 1 tablet (25 mg total) by mouth nightly as needed for Insomnia., Disp: 30 tablet, Rfl: 2    apixaban (ELIQUIS) 5 mg Tab, Take 1 tablet (5 mg total) by mouth 2 (two) times daily., Disp: 180 tablet, Rfl: 1    aspirin (ECOTRIN) 81 MG EC tablet, Take 1 tablet by mouth Daily. , Disp: , Rfl:     BD ULTRA-FINE MINI PEN NEEDLE 31 gauge x 3/16" Ndle, Use to inject insulin as needed up to 4 times daily, Disp: 100 each, Rfl: 5    blood sugar diagnostic (CONTOUR NEXT TEST STRIPS) Strp, Test blood sugar 4 (four) times daily before meals and nightly., Disp: 100 each, Rfl: 1    blood-glucose meter (FREESTYLE LITE METER) kit, 1 each 4 (four) times daily., Disp: 1 each, Rfl: 0    blood-glucose sensor (DEXCOM G7 SENSOR) Alba, Use as directed for continuous glucose monitoring; Change every 10 days., Disp: 3 each, Rfl: 5    calcitRIOL (ROCALTROL) 0.25 MCG Cap, Take 1 capsule (0.25 mcg total) by mouth once daily., Disp: 30 capsule, Rfl: 11    famotidine (PEPCID) 20 MG tablet, TAKE ONE TABLET BY MOUTH EVERY EVENING, Disp: 30 tablet, Rfl: 11    ferrous sulfate (FEOSOL) 325 mg (65 mg iron) Tab tablet, Take 1 tablet (325 mg total) by mouth daily with breakfast., Disp: 30 tablet, Rfl: 11    fish oil-omega-3 fatty acids 300-1,000 mg capsule, Take 1 g by mouth once daily., Disp: , Rfl:     furosemide (LASIX) 80 MG tablet, Take one-half tablet (40 mg) by mouth 2 (two) times daily., Disp: 30 tablet, Rfl: 11    HYDROcodone-acetaminophen (NORCO) 5-325 mg per tablet, Take 1 tablet by mouth every 6 (six) hours as needed for Pain., Disp: 20 tablet, Rfl: 0    insulin glargine U-100, Lantus, (LANTUS SOLOSTAR U-100 INSULIN) 100 unit/mL (3 mL) InPn pen, Inject 30 Units into the skin 2 (two) times daily., Disp: 30 mL, Rfl: 2    ketoconazole (NIZORAL) 200 mg Tab, Take ½ tablet (100 mg total) by mouth once daily., Disp: 45 tablet, Rfl: 3    lancets (MICROLET LANCET) Misc, 100 lancets by Misc.(Non-Drug; Combo Route) route 4 (four) times daily " before meals and nightly., Disp: 100 each, Rfl: 11    magnesium oxide (MAG-OX) 400 mg (241.3 mg magnesium) tablet, Take 1 tablet (400 mg total) by mouth once daily., Disp: 90 tablet, Rfl: 2    metoprolol succinate (TOPROL-XL) 25 MG 24 hr tablet, Take 1 tablet (25 mg total) by mouth once daily., Disp: 90 tablet, Rfl: 3    multivitamin (THERAGRAN) tablet, Take 1 tablet by mouth once daily., Disp: , Rfl:     mycophenolate (CELLCEPT) 250 mg Cap, Take 2 capsules (500 mg total) by mouth 2 (two) times daily., Disp: 120 capsule, Rfl: 11    NOVOLOG FLEXPEN U-100 INSULIN 100 unit/mL (3 mL) InPn pen, Inject 25 Units into the skin 3 (three) times daily with meals., Disp: 30 mL, Rfl: 1    ondansetron (ZOFRAN) 4 MG tablet, Take 1 tablet (4 mg total) by mouth every 6 (six) hours., Disp: 30 tablet, Rfl: 0    ondansetron (ZOFRAN-ODT) 4 MG TbDL, Dissolve 1 tablet (4 mg total) by mouth every 8 (eight) hours as needed (Nausea/vomiting)., Disp: 12 tablet, Rfl: 0    potassium chloride SA (K-DUR,KLOR-CON) 20 MEQ tablet, Take 2 tablets (40 mEq total) by mouth once daily., Disp: 180 tablet, Rfl: 1    predniSONE (DELTASONE) 5 MG tablet, Take 1 tablet (5 mg total) by mouth once daily., Disp: 30 tablet, Rfl: 11    rosuvastatin (CRESTOR) 40 MG Tab, Take 1 tablet (40 mg total) by mouth once daily., Disp: 90 tablet, Rfl: 3    sacubitriL-valsartan (ENTRESTO)  mg per tablet, Take 1 tablet by mouth 2 (two) times daily., Disp: 180 tablet, Rfl: 1    semaglutide (OZEMPIC) 2 mg/dose (8 mg/3 mL) PnIj, Inject 2 mg into the skin every 7 days., Disp: 6 mL, Rfl: 1    tacrolimus (PROGRAF) 1 MG Cap, Take 5 capsules (5 mg total) by mouth every 12 (twelve) hours., Disp: 300 capsule, Rfl: 11    triamcinolone acetonide 0.1% (KENALOG) 0.1 % ointment, Apply topically 2 (two) times daily. Use on bilateral lower legs., Disp: 454 g, Rfl: 1     Physical Examination   VS/Measurements   BP (!) 112/55   Pulse (!) 119   Temp 97.6 °F (36.4 °C) (Oral)   Resp (!) 23   " Ht 5' 7" (1.702 m)   Wt 130.1 kg (286 lb 13.1 oz)   SpO2 99%   BMI 44.92 kg/m²     Physical Exam  Vitals and nursing note reviewed.   Constitutional:       General: He is not in acute distress.     Appearance: He is obese. He is ill-appearing.   HENT:      Mouth/Throat:      Mouth: Mucous membranes are moist.   Eyes:      General: No scleral icterus.  Cardiovascular:      Rate and Rhythm: Regular rhythm. Tachycardia present.      Heart sounds:      No friction rub.   Pulmonary:      Effort: Pulmonary effort is normal. No respiratory distress.      Breath sounds: No rales.      Comments: Relatively clear to auscultation  Abdominal:      General: There is distension.      Palpations: Abdomen is soft.      Tenderness: There is no abdominal tenderness.   Musculoskeletal:         General: Swelling (Brawny edema) present.      Cervical back: Normal range of motion and neck supple.      Comments: Old AV fistula in his right upper arm -nonfunctional   Skin:     General: Skin is warm and dry.      Comments: Brawny edema with blisters involving both lower extremities and skin changes consistent with chronic venous stasis bilaterally   Neurological:      General: No focal deficit present.      Mental Status: He is alert and oriented to person, place, and time.            Review / Management   Laboratory Results   Today's Lab Results :    Recent Results (from the past 24 hours)   CBC W/ AUTO DIFFERENTIAL    Collection Time: 01/12/25  6:39 AM   Result Value Ref Range    WBC 3.67 (L) 3.90 - 12.70 K/uL    RBC 3.83 (L) 4.60 - 6.20 M/uL    Hemoglobin 9.5 (L) 14.0 - 18.0 g/dL    Hematocrit 32.6 (L) 40.0 - 54.0 %    MCV 85 82 - 98 fL    MCH 24.8 (L) 27.0 - 31.0 pg    MCHC 29.1 (L) 32.0 - 36.0 g/dL    RDW 13.8 11.5 - 14.5 %    Platelets 245 150 - 450 K/uL    MPV 10.2 9.2 - 12.9 fL    Immature Granulocytes CANCELED 0.0 - 0.5 %    Immature Grans (Abs) CANCELED 0.00 - 0.04 K/uL    Lymph # CANCELED 1.0 - 4.8 K/uL    Mono # CANCELED " 0.3 - 1.0 K/uL    Eos # CANCELED 0.0 - 0.5 K/uL    Baso # CANCELED 0.00 - 0.20 K/uL    nRBC 0 0 /100 WBC    Gran % 47.0 38.0 - 73.0 %    Lymph % 27.0 18.0 - 48.0 %    Mono % 26.0 (H) 4.0 - 15.0 %    Eosinophil % 0.0 0.0 - 8.0 %    Basophil % 0.0 0.0 - 1.9 %    Platelet Estimate Clumped (A)     Poik Slight     Tear Drop Cells Occasional     Schistocytes Present     Large/Giant Platelets Present     Differential Method Manual    Comp. Metabolic Panel    Collection Time: 01/12/25  6:39 AM   Result Value Ref Range    Sodium 141 136 - 145 mmol/L    Potassium 6.7 (HH) 3.5 - 5.1 mmol/L    Chloride 105 95 - 110 mmol/L    CO2 23 23 - 29 mmol/L    Glucose 146 (H) 70 - 110 mg/dL    BUN 82 (H) 8 - 23 mg/dL    Creatinine 4.3 (H) 0.5 - 1.4 mg/dL    Calcium 9.8 8.7 - 10.5 mg/dL    Total Protein 6.2 6.0 - 8.4 g/dL    Albumin 2.3 (L) 3.5 - 5.2 g/dL    Total Bilirubin 0.5 0.1 - 1.0 mg/dL    Alkaline Phosphatase 96 40 - 150 U/L    AST 17 10 - 40 U/L    ALT 11 10 - 44 U/L    eGFR 14 (A) >60 mL/min/1.73 m^2    Anion Gap 13 8 - 16 mmol/L   Lipase    Collection Time: 01/12/25  6:39 AM   Result Value Ref Range    Lipase 4 4 - 60 U/L   Lactic acid, plasma    Collection Time: 01/12/25  6:39 AM   Result Value Ref Range    Lactate (Lactic Acid) 1.1 0.5 - 2.2 mmol/L   Procalcitonin    Collection Time: 01/12/25  6:39 AM   Result Value Ref Range    Procalcitonin 2.01 (H) <0.25 ng/mL   Magnesium    Collection Time: 01/12/25  6:39 AM   Result Value Ref Range    Magnesium 2.9 (H) 1.6 - 2.6 mg/dL   CPK    Collection Time: 01/12/25  6:39 AM   Result Value Ref Range    CPK 55 20 - 200 U/L   Brain natriuretic peptide    Collection Time: 01/12/25  6:39 AM   Result Value Ref Range     (H) 0 - 99 pg/mL   Urinalysis, Reflex to Urine Culture Urine, Catheterized    Collection Time: 01/12/25  7:09 AM    Specimen: Urine   Result Value Ref Range    Specimen UA Urine, Catheterized     Color, UA Yellow Yellow, Straw, Nikki    Appearance, UA Hazy (A) Clear     pH, UA 6.0 5.0 - 8.0    Specific Gravity, UA 1.015 1.005 - 1.030    Protein, UA 1+ (A) Negative    Glucose, UA Negative Negative    Ketones, UA Negative Negative    Bilirubin (UA) Negative Negative    Occult Blood UA Negative Negative    Nitrite, UA Negative Negative    Urobilinogen, UA Negative <2.0 EU/dL    Leukocytes, UA Negative Negative   Urinalysis Microscopic    Collection Time: 01/12/25  7:09 AM   Result Value Ref Range    RBC, UA 3 0 - 4 /hpf    WBC, UA 11 (H) 0 - 5 /hpf    Bacteria Rare None-Occ /hpf    Squam Epithel, UA 2 /hpf    Hyaline Casts, UA 0 0-1/lpf /lpf    Microscopic Comment SEE COMMENT    POCT glucose    Collection Time: 01/12/25  7:39 AM   Result Value Ref Range    POCT Glucose 159 (H) 70 - 110 mg/dL   Type & Screen    Collection Time: 01/12/25  7:53 AM   Result Value Ref Range    Group & Rh B POS     Indirect Jimmy NEG     Specimen Outdate 01/15/2025 23:59    Occult blood x 1, stool    Collection Time: 01/12/25  8:00 AM    Specimen: Stool   Result Value Ref Range    Occult Blood Positive (A) Negative   POCT glucose    Collection Time: 01/12/25  8:31 AM   Result Value Ref Range    POCT Glucose 265 (H) 70 - 110 mg/dL   Basic metabolic panel    Collection Time: 01/12/25  8:44 AM   Result Value Ref Range    Sodium 139 136 - 145 mmol/L    Potassium 5.4 (H) 3.5 - 5.1 mmol/L    Chloride 105 95 - 110 mmol/L    CO2 21 (L) 23 - 29 mmol/L    Glucose 246 (H) 70 - 110 mg/dL    BUN 80 (H) 8 - 23 mg/dL    Creatinine 4.3 (H) 0.5 - 1.4 mg/dL    Calcium 9.6 8.7 - 10.5 mg/dL    Anion Gap 13 8 - 16 mmol/L    eGFR 14 (A) >60 mL/min/1.73 m^2   Rotavirus antigen, stool    Collection Time: 01/12/25 10:21 AM   Result Value Ref Range    Rotavirus Negative Negative   Clostridium difficile EIA    Collection Time: 01/12/25 10:21 AM    Specimen: Stool   Result Value Ref Range    C. diff Antigen Negative Negative    C difficile Toxins A+B, EIA Negative Negative   CBC auto differential    Collection Time: 01/12/25 10:56  AM   Result Value Ref Range    WBC 3.14 (L) 3.90 - 12.70 K/uL    RBC 3.42 (L) 4.60 - 6.20 M/uL    Hemoglobin 8.6 (L) 14.0 - 18.0 g/dL    Hematocrit 29.1 (L) 40.0 - 54.0 %    MCV 85 82 - 98 fL    MCH 25.1 (L) 27.0 - 31.0 pg    MCHC 29.6 (L) 32.0 - 36.0 g/dL    RDW 13.9 11.5 - 14.5 %    Platelets 223 150 - 450 K/uL    MPV 10.1 9.2 - 12.9 fL    Immature Granulocytes CANCELED 0.0 - 0.5 %    Immature Grans (Abs) CANCELED 0.00 - 0.04 K/uL    nRBC 0 0 /100 WBC    Gran % 42.0 38.0 - 73.0 %    Lymph % 24.0 18.0 - 48.0 %    Mono % 33.0 (H) 4.0 - 15.0 %    Eosinophil % 1.0 0.0 - 8.0 %    Basophil % 0.0 0.0 - 1.9 %    Platelet Estimate Clumped (A)     Poik Slight     Ovalocytes Occasional     Tear Drop Cells Occasional     Schistocytes Present     Large/Giant Platelets Present     Differential Method Manual         Impression and Plan   Diagnosis     Acute kidney injury in the setting of underlying CKD involving a transplant kidney.  The etiology of his ANGELITO is unclear at this time.  There may be some degree of cardiorenal type physiology.  Most consider obstruction and we will obtain an ultrasound.    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    Lower extremity edema with skin changes consistent with venous stasis    Mild hyperkalemia in the setting of acute and chronic kidney disease.  He is on Entresto as well as potassium supplements--would suggest holding the latter.    Anemia of chronic kidney disease    Hemoccult-positive stool with possible GI bleeding    Hypertension with renal disease    Diabetes with renal disease    CAD/CHF with diminished ejection fraction followed by Dr. Man of Cardiology.    We will obtain an ultrasound to rule out an obstructive process.  Obviously avoid nephrotoxic medications and contrast.  Hold supplemental potassium.  We will follow serial laboratory measurements.  I suspect we will have to accept some degree of  azotemia in order to keep him out of heart failure and keep his volume under control.       .     ________________________________________________  Arturo  Ivet     This document was created using voice recognition software.  It is possible that there are errors which have persisted after original proofreading.  If there is a question regarding contents of this document please contact me for clarification.

## 2025-01-13 PROBLEM — K63.89: Status: ACTIVE | Noted: 2025-01-13

## 2025-01-13 PROBLEM — K92.1 MELENA: Status: ACTIVE | Noted: 2025-01-13

## 2025-01-13 PROBLEM — R31.0 GROSS HEMATURIA: Status: ACTIVE | Noted: 2025-01-13

## 2025-01-13 PROBLEM — T45.1X5A: Status: ACTIVE | Noted: 2025-01-13

## 2025-01-13 PROBLEM — E87.5 HYPERKALEMIA: Status: ACTIVE | Noted: 2025-01-13

## 2025-01-13 LAB
ALBUMIN SERPL BCP-MCNC: 1.9 G/DL (ref 3.5–5.2)
ANION GAP SERPL CALC-SCNC: 13 MMOL/L (ref 8–16)
ANION GAP SERPL CALC-SCNC: 14 MMOL/L (ref 8–16)
ANION GAP SERPL CALC-SCNC: 9 MMOL/L (ref 8–16)
ANISOCYTOSIS BLD QL SMEAR: SLIGHT
ANISOCYTOSIS BLD QL SMEAR: SLIGHT
BACTERIA UR CULT: NO GROWTH
BASOPHILS # BLD AUTO: ABNORMAL K/UL (ref 0–0.2)
BASOPHILS NFR BLD: 0 % (ref 0–1.9)
BASOPHILS NFR BLD: 0 % (ref 0–1.9)
BUN SERPL-MCNC: 81 MG/DL (ref 8–23)
BUN SERPL-MCNC: 82 MG/DL (ref 8–23)
BUN SERPL-MCNC: 84 MG/DL (ref 8–23)
CALCIUM SERPL-MCNC: 8.6 MG/DL (ref 8.7–10.5)
CALCIUM SERPL-MCNC: 9.1 MG/DL (ref 8.7–10.5)
CALCIUM SERPL-MCNC: 9.6 MG/DL (ref 8.7–10.5)
CHLORIDE SERPL-SCNC: 106 MMOL/L (ref 95–110)
CHLORIDE SERPL-SCNC: 107 MMOL/L (ref 95–110)
CHLORIDE SERPL-SCNC: 108 MMOL/L (ref 95–110)
CO2 SERPL-SCNC: 19 MMOL/L (ref 23–29)
CO2 SERPL-SCNC: 21 MMOL/L (ref 23–29)
CO2 SERPL-SCNC: 25 MMOL/L (ref 23–29)
CREAT SERPL-MCNC: 4.3 MG/DL (ref 0.5–1.4)
CREAT SERPL-MCNC: 4.3 MG/DL (ref 0.5–1.4)
CREAT SERPL-MCNC: 4.5 MG/DL (ref 0.5–1.4)
CRYPTOSP AG STL QL IA: NEGATIVE
DIFFERENTIAL METHOD BLD: ABNORMAL
DIFFERENTIAL METHOD BLD: ABNORMAL
E COLI SXT1 STL QL IA: NEGATIVE
E COLI SXT2 STL QL IA: NEGATIVE
EOSINOPHIL # BLD AUTO: ABNORMAL K/UL (ref 0–0.5)
EOSINOPHIL NFR BLD: 0 % (ref 0–8)
EOSINOPHIL NFR BLD: 0 % (ref 0–8)
ERYTHROCYTE [DISTWIDTH] IN BLOOD BY AUTOMATED COUNT: 13.9 % (ref 11.5–14.5)
ERYTHROCYTE [DISTWIDTH] IN BLOOD BY AUTOMATED COUNT: 13.9 % (ref 11.5–14.5)
EST. GFR  (NO RACE VARIABLE): 13 ML/MIN/1.73 M^2
EST. GFR  (NO RACE VARIABLE): 14 ML/MIN/1.73 M^2
EST. GFR  (NO RACE VARIABLE): 14 ML/MIN/1.73 M^2
G LAMBLIA AG STL QL IA: NEGATIVE
GLUCOSE SERPL-MCNC: 261 MG/DL (ref 70–110)
GLUCOSE SERPL-MCNC: 267 MG/DL (ref 70–110)
GLUCOSE SERPL-MCNC: 268 MG/DL (ref 70–110)
HCT VFR BLD AUTO: 29 % (ref 40–54)
HCT VFR BLD AUTO: 29.3 % (ref 40–54)
HGB BLD-MCNC: 8.7 G/DL (ref 14–18)
HGB BLD-MCNC: 8.7 G/DL (ref 14–18)
IMM GRANULOCYTES # BLD AUTO: ABNORMAL K/UL (ref 0–0.04)
IMM GRANULOCYTES # BLD AUTO: ABNORMAL K/UL (ref 0–0.04)
IMM GRANULOCYTES NFR BLD AUTO: ABNORMAL % (ref 0–0.5)
IMM GRANULOCYTES NFR BLD AUTO: ABNORMAL % (ref 0–0.5)
LYMPHOCYTES # BLD AUTO: ABNORMAL K/UL (ref 1–4.8)
LYMPHOCYTES NFR BLD: 15 % (ref 18–48)
LYMPHOCYTES NFR BLD: 7 % (ref 18–48)
MCH RBC QN AUTO: 24.4 PG (ref 27–31)
MCH RBC QN AUTO: 25.2 PG (ref 27–31)
MCHC RBC AUTO-ENTMCNC: 29.7 G/DL (ref 32–36)
MCHC RBC AUTO-ENTMCNC: 30 G/DL (ref 32–36)
MCV RBC AUTO: 82 FL (ref 82–98)
MCV RBC AUTO: 84 FL (ref 82–98)
MONOCYTES # BLD AUTO: ABNORMAL K/UL (ref 0.3–1)
MONOCYTES NFR BLD: 22 % (ref 4–15)
MONOCYTES NFR BLD: 25 % (ref 4–15)
NEUTROPHILS NFR BLD: 63 % (ref 38–73)
NEUTROPHILS NFR BLD: 66 % (ref 38–73)
NEUTS BAND NFR BLD MANUAL: 2 %
NRBC BLD-RTO: 0 /100 WBC
NRBC BLD-RTO: 0 /100 WBC
OVALOCYTES BLD QL SMEAR: ABNORMAL
OVALOCYTES BLD QL SMEAR: ABNORMAL
PHOSPHATE SERPL-MCNC: 4.5 MG/DL (ref 2.7–4.5)
PLATELET # BLD AUTO: 220 K/UL (ref 150–450)
PLATELET # BLD AUTO: 228 K/UL (ref 150–450)
PLATELET BLD QL SMEAR: ABNORMAL
PLATELET BLD QL SMEAR: ABNORMAL
PMV BLD AUTO: 10.2 FL (ref 9.2–12.9)
PMV BLD AUTO: 10.4 FL (ref 9.2–12.9)
POCT GLUCOSE: 262 MG/DL (ref 70–110)
POCT GLUCOSE: 274 MG/DL (ref 70–110)
POCT GLUCOSE: 306 MG/DL (ref 70–110)
POCT GLUCOSE: 316 MG/DL (ref 70–110)
POTASSIUM SERPL-SCNC: 5.9 MMOL/L (ref 3.5–5.1)
POTASSIUM SERPL-SCNC: 6.1 MMOL/L (ref 3.5–5.1)
POTASSIUM SERPL-SCNC: 6.1 MMOL/L (ref 3.5–5.1)
RBC # BLD AUTO: 3.45 M/UL (ref 4.6–6.2)
RBC # BLD AUTO: 3.56 M/UL (ref 4.6–6.2)
SODIUM SERPL-SCNC: 139 MMOL/L (ref 136–145)
SODIUM SERPL-SCNC: 141 MMOL/L (ref 136–145)
SODIUM SERPL-SCNC: 142 MMOL/L (ref 136–145)
TACROLIMUS BLD-MCNC: 32.8 NG/ML (ref 5–15)
WBC # BLD AUTO: 2.84 K/UL (ref 3.9–12.7)
WBC # BLD AUTO: 2.96 K/UL (ref 3.9–12.7)
WBC #/AREA STL HPF: NORMAL /[HPF]

## 2025-01-13 PROCEDURE — 21400001 HC TELEMETRY ROOM

## 2025-01-13 PROCEDURE — 85007 BL SMEAR W/DIFF WBC COUNT: CPT | Performed by: NURSE PRACTITIONER

## 2025-01-13 PROCEDURE — 99232 SBSQ HOSP IP/OBS MODERATE 35: CPT | Mod: ,,, | Performed by: INTERNAL MEDICINE

## 2025-01-13 PROCEDURE — 85027 COMPLETE CBC AUTOMATED: CPT | Performed by: NURSE PRACTITIONER

## 2025-01-13 PROCEDURE — 63600175 PHARM REV CODE 636 W HCPCS: Performed by: EMERGENCY MEDICINE

## 2025-01-13 PROCEDURE — 36415 COLL VENOUS BLD VENIPUNCTURE: CPT | Performed by: INTERNAL MEDICINE

## 2025-01-13 PROCEDURE — 80048 BASIC METABOLIC PNL TOTAL CA: CPT | Mod: 91 | Performed by: HOSPITALIST

## 2025-01-13 PROCEDURE — 80069 RENAL FUNCTION PANEL: CPT | Performed by: INTERNAL MEDICINE

## 2025-01-13 PROCEDURE — 99223 1ST HOSP IP/OBS HIGH 75: CPT | Mod: ,,, | Performed by: PHYSICIAN ASSISTANT

## 2025-01-13 PROCEDURE — 36415 COLL VENOUS BLD VENIPUNCTURE: CPT | Performed by: NURSE PRACTITIONER

## 2025-01-13 PROCEDURE — 85027 COMPLETE CBC AUTOMATED: CPT | Mod: 91 | Performed by: EMERGENCY MEDICINE

## 2025-01-13 PROCEDURE — 25000003 PHARM REV CODE 250: Performed by: INTERNAL MEDICINE

## 2025-01-13 PROCEDURE — 25000003 PHARM REV CODE 250: Performed by: HOSPITALIST

## 2025-01-13 PROCEDURE — 25000003 PHARM REV CODE 250: Performed by: EMERGENCY MEDICINE

## 2025-01-13 PROCEDURE — 36415 COLL VENOUS BLD VENIPUNCTURE: CPT | Mod: XB | Performed by: HOSPITALIST

## 2025-01-13 PROCEDURE — 85007 BL SMEAR W/DIFF WBC COUNT: CPT | Mod: 91 | Performed by: EMERGENCY MEDICINE

## 2025-01-13 PROCEDURE — 80048 BASIC METABOLIC PNL TOTAL CA: CPT | Performed by: EMERGENCY MEDICINE

## 2025-01-13 PROCEDURE — 80197 ASSAY OF TACROLIMUS: CPT | Performed by: EMERGENCY MEDICINE

## 2025-01-13 PROCEDURE — A4216 STERILE WATER/SALINE, 10 ML: HCPCS | Performed by: EMERGENCY MEDICINE

## 2025-01-13 PROCEDURE — 25000003 PHARM REV CODE 250: Performed by: PHYSICIAN ASSISTANT

## 2025-01-13 RX ORDER — SODIUM CHLORIDE 9 MG/ML
INJECTION, SOLUTION INTRAVENOUS CONTINUOUS
Status: DISCONTINUED | OUTPATIENT
Start: 2025-01-13 | End: 2025-01-16

## 2025-01-13 RX ORDER — HYDROCODONE BITARTRATE AND ACETAMINOPHEN 5; 325 MG/1; MG/1
1 TABLET ORAL ONCE
Status: COMPLETED | OUTPATIENT
Start: 2025-01-13 | End: 2025-01-13

## 2025-01-13 RX ORDER — PANTOPRAZOLE SODIUM 40 MG/1
40 TABLET, DELAYED RELEASE ORAL 2 TIMES DAILY
Status: DISCONTINUED | OUTPATIENT
Start: 2025-01-13 | End: 2025-01-29 | Stop reason: HOSPADM

## 2025-01-13 RX ADMIN — INSULIN ASPART 1 UNITS: 100 INJECTION, SOLUTION INTRAVENOUS; SUBCUTANEOUS at 09:01

## 2025-01-13 RX ADMIN — SODIUM CHLORIDE: 9 INJECTION, SOLUTION INTRAVENOUS at 03:01

## 2025-01-13 RX ADMIN — INSULIN ASPART 4 UNITS: 100 INJECTION, SOLUTION INTRAVENOUS; SUBCUTANEOUS at 04:01

## 2025-01-13 RX ADMIN — INSULIN ASPART 3 UNITS: 100 INJECTION, SOLUTION INTRAVENOUS; SUBCUTANEOUS at 06:01

## 2025-01-13 RX ADMIN — SODIUM ZIRCONIUM CYCLOSILICATE 5 G: 5 POWDER, FOR SUSPENSION ORAL at 10:01

## 2025-01-13 RX ADMIN — MYCOPHENOLATE MOFETIL 500 MG: 500 TABLET, FILM COATED ORAL at 09:01

## 2025-01-13 RX ADMIN — Medication 10 ML: at 09:01

## 2025-01-13 RX ADMIN — PANTOPRAZOLE SODIUM 40 MG: 40 TABLET, DELAYED RELEASE ORAL at 09:01

## 2025-01-13 RX ADMIN — TACROLIMUS 5 MG: 1 CAPSULE ORAL at 08:01

## 2025-01-13 RX ADMIN — INSULIN ASPART 4 UNITS: 100 INJECTION, SOLUTION INTRAVENOUS; SUBCUTANEOUS at 01:01

## 2025-01-13 RX ADMIN — MYCOPHENOLATE MOFETIL 500 MG: 500 TABLET, FILM COATED ORAL at 08:01

## 2025-01-13 RX ADMIN — Medication 10 ML: at 06:01

## 2025-01-13 RX ADMIN — PANTOPRAZOLE SODIUM 40 MG: 40 TABLET, DELAYED RELEASE ORAL at 10:01

## 2025-01-13 RX ADMIN — HYDROCODONE BITARTRATE AND ACETAMINOPHEN 1 TABLET: 5; 325 TABLET ORAL at 04:01

## 2025-01-13 RX ADMIN — SODIUM ZIRCONIUM CYCLOSILICATE 10 G: 5 POWDER, FOR SUSPENSION ORAL at 03:01

## 2025-01-13 RX ADMIN — Medication 10 ML: at 01:01

## 2025-01-13 NOTE — ASSESSMENT & PLAN NOTE
See plan under anemia.   No signs of ongoing bleeding. Will monitor while hyperkalemia and renal fxn addressed.

## 2025-01-13 NOTE — PLAN OF CARE
[facility] - Telemetry (Hospital)  Initial Discharge Assessment       Primary Care Provider: Valery Caal MD    Admission Diagnosis: Acute hyperkalemia [E87.5]  Weakness [R53.1]  Chronic anticoagulation [Z79.01]  Intravascular volume depletion [E86.1]  Acute upper GI bleed [K92.2]  History of kidney transplant [Z94.0]  Vomiting and diarrhea [R11.10, R19.7]  Gastrointestinal hemorrhage with melena [K92.1]  Acute kidney injury superimposed on chronic kidney disease [N17.9, N18.9]  Acute on chronic anemia [D64.9]  Immunosuppression due to drug therapy [D84.821, Z79.899]    Admission Date: 1/12/2025  Expected Discharge Date:     Transition of Care Barriers: None    Payor: BLUE CROSS BLUE SHIELD / Plan: BCJOSE R OF [place] PPO / Product Type: PPO /     Extended Emergency Contact Information  Primary Emergency Contact: Edward Handley   [place]  Mobile Phone: [phone]  Relation: Daughter  Secondary Emergency Contact: Marybeth Handley  Address: [address] [place] [address]  Home Phone: [phone]  Relation: Daughter    Discharge Plan A: Home with family, Home  Discharge Plan B: Home Health      [facility] [place]  [facility] [address]  Phone: [phone] Fax: [phone]      Initial Assessment (most recent)       Adult Discharge Assessment - 01/13/25 1425          Discharge Assessment    Assessment Type Discharge Planning Assessment     Confirmed/corrected address, phone number and insurance Yes     Confirmed Demographics Correct on Facesheet     Source of Information patient;family     Communicated GRETEL with patient/caregiver Date not available/Unable to determine     Reason For Admission Home     People in Home child(nigel), adult;spouse     Facility Arrived From: Home     Do you expect to return to your current living situation? Yes     Do you have help at home or someone to help you manage your care at home? Yes      Who are your caregiver(s) and their phone number(s)? Spouse and daughters     Prior to hospitilization cognitive status: Alert/Oriented     Current cognitive status: Alert/Oriented     Walking or Climbing Stairs Difficulty no     Dressing/Bathing Difficulty no     Equipment Currently Used at Home none     Readmission within 30 days? No     Patient currently being followed by outpatient case management? No     Do you currently have service(s) that help you manage your care at home? No     Do you take prescription medications? Yes     Do you have prescription coverage? Yes     Do you have any problems affording any of your prescribed medications? No     Is the patient taking medications as prescribed? yes     Who is going to help you get home at discharge? Family will arrange     How do you get to doctors appointments? family or friend will provide;car, drives self     Are you on dialysis? No     Do you take coumadin? No     Discharge Plan A Home with family;Home     Discharge Plan B Home Health     DME Needed Upon Discharge  none     Discharge Plan discussed with: Patient;Adult children;Spouse/sig other     Transition of Care Barriers None                    Anticipated DC dispo: Home; home health  Prior Level of Function: Lives at home with spouse and daughter, Marybeth. Pt normally indpt at baseline, ambulatory without DME and still drives. Family will be help at home and transport for discharge  PCP: MD Afua      Comments: CM needs pending hospital course. Pt's whiteboard updated with CM contact and anticipated discharge disposition. SW to remain available as needed.

## 2025-01-13 NOTE — H&P
[facility] Medicine  History & Physical    Patient Name: Mitch Whittaker  MRN: [id number]  Patient Class: IP- Inpatient  Admission Date: 1/12/2025  Attending Physician: Jaymie Medley MD   Primary Care Provider: Valery Caal MD         Patient information was obtained from patient, past medical records, and ER records.     Subjective:     Principal Problem:<principal problem not specified>    Chief Complaint:   Chief Complaint   Patient presents with    Fatigue     Family report pt has been sitting in chair for 3 days, incontinent of bowel and urine , kidney transplant 2015, former dialysis, shunt located right arm        HPI: Mitch Whittaker is a 69 y.o. male patient with a PMHx of CHF- EF 40%, anemia, hyperparathyroidism, type 2 DM, s/p kidney transplant 2015 on Prograf and Cellcept, DVT on Eliquis, MARION, HLD, HTN, CKD, Hep C, and arthritis who presents to the Emergency Department for evaluation of nausea vomiting and diarrhea over the past several days (both of which have improved), but felt weak and couldn't move around like normal. no abd pain, no systemic symptoms, stool now formed. Pt is a very poor historian. According to nursing staff, family reported that the pt has been sitting in his chair for the last 3 days. Pt reports that he has had two episodes of diarrhea since yesterday, but due to the increasing weakness in his knees he was unable to get up from his chair. Pt normally ambulates with assistance from a walker. Symptoms are constant and moderate in severity. Associated sxs include blood in stool (x4 days) and n/v yesterday, but none today after PO. Patient denies any fever, abdominal pain, appetite changes, SOB, dysuria, and all other sxs at this time. No prior tx. Pt is on Eliquis. No further complaints or concerns at this time.   In the ER, VS /65, , Sats 100% on RA, Afeb, WBC 3.7, H/H 9.5/33, Bun/Cr 82/4.3, K 6.7- treated aggressively in the ER and  rechecked and repeat 5.4. He is heme positive as well. C Diff Neg. She is being admitted for possible Hyperkalemia, acute GI Bleed, ANGELITO.     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 RNA NEGATIVE 5/2015, 2021, 2022    History of colon polyps     HPTH (hyperparathyroidism)     Hyperlipidemia     Hypertension associated with stage 3 chronic kidney disease due to type 2 diabetes mellitus     LBBB (left bundle branch block) 12/20/2021    Morbid obesity with BMI of 45.0-49.9, adult     Nephrolithiasis 6/7/2013    PCO (posterior capsular opacification), left 03/04/2019    Proteinuria     resolved s/p transplant    S/P kidney transplant     Sleep apnea     Type 2 diabetes, uncontrolled, with retinopathy     Type II diabetes mellitus with renal manifestations        Past Surgical History:   Procedure Laterality Date    CARDIAC CATHETERIZATION  01/01/2008    normal coronary    CARPAL TUNNEL RELEASE Right 12/01/2023    Procedure: RELEASE, CARPAL TUNNEL;  Surgeon: Noel Almonte MD;  Location: [facility] OR;  Service: Orthopedics;  Laterality: Right;    CARPAL TUNNEL RELEASE Left 7/18/2024    Procedure: RELEASE, CARPAL TUNNEL;  Surgeon: Noel Almonte MD;  Location: [facility] OR;  Service:  Orthopedics;  Laterality: Left;    COLONOSCOPY N/A 04/05/2018    Procedure: COLONOSCOPY;  Surgeon: Chava Ronquillo MD;  Location: [facility];  Service: Endoscopy;  Laterality: N/A;    COLONOSCOPY N/A 05/02/2022    Procedure: COLONOSCOPY;  Surgeon: Alix Puente MD;  Location: [facility];  Service: Endoscopy;  Laterality: N/A;    COLONOSCOPY N/A 06/07/2023    Procedure: COLONOSCOPY - rule out CMV  Cardiac clearance/Eliquis hold approval received on 05/21/23 per Dr. Meade, cardiology.  Note in encounters.  LB;  Surgeon: Daniella Shah MD;  Location: [facility];  Service: Endoscopy;  Laterality: N/A;    ESOPHAGOGASTRODUODENOSCOPY N/A 03/26/2024    Procedure: EGD (ESOPHAGOGASTRODUODENOSCOPY) 3/1-pt cleared, ok to hold eliquis for 3 days;  Surgeon: Daniella Shah MD;  Location: [facility];  Service: Endoscopy;  Laterality: N/A;    KIDNEY TRANSPLANT  2015    RETINAL LASER PROCEDURE         Review of patient's allergies indicates:   Allergen Reactions    Lisinopril Other (See Comments)     Other reaction(s):  cough    Actos  [pioglitazone] Other (See Comments)     Other reaction(s): CHF    Metformin Other (See Comments)     Other reaction(s): renal insuff  Other reaction(s): CHF       No current facility-administered medications on file prior to encounter.     Current Outpatient Medications on File Prior to Encounter   Medication Sig    albuterol (PROVENTIL) 2.5 mg /3 mL (0.083 %) nebulizer solution Take 3 mLs (2.5 mg total) by nebulization every 4 to 6 hours as needed for Wheezing or Shortness of Breath.    albuterol (PROVENTIL/VENTOLIN HFA) 90 mcg/actuation inhaler Inhale 2 puffs into the lungs every 4 (four) hours as needed for Wheezing or Shortness of Breath.    amitriptyline (ELAVIL) 25 MG tablet Take 1 tablet (25 mg total) by mouth nightly as needed for Insomnia.    apixaban (ELIQUIS) 5 mg Tab Take 1 tablet (5 mg total) by mouth 2 (two) times daily.    aspirin (ECOTRIN) 81 MG EC tablet Take 1 tablet by  "mouth Daily.     BD ULTRA-FINE MINI PEN NEEDLE 31 gauge x 3/16" Ndle Use to inject insulin as needed up to 4 times daily    blood sugar diagnostic (CONTOUR NEXT TEST STRIPS) Strp Test blood sugar 4 (four) times daily before meals and nightly.    blood-glucose meter (FREESTYLE LITE METER) kit 1 each 4 (four) times daily.    blood-glucose sensor (DEXCOM G7 SENSOR) Alba Use as directed for continuous glucose monitoring; Change every 10 days.    calcitRIOL (ROCALTROL) 0.25 MCG Cap Take 1 capsule (0.25 mcg total) by mouth once daily.    famotidine (PEPCID) 20 MG tablet TAKE ONE TABLET BY MOUTH EVERY EVENING    ferrous sulfate (FEOSOL) 325 mg (65 mg iron) Tab tablet Take 1 tablet (325 mg total) by mouth daily with breakfast.    fish oil-omega-3 fatty acids 300-1,000 mg capsule Take 1 g by mouth once daily.    furosemide (LASIX) 80 MG tablet Take one-half tablet (40 mg) by mouth 2 (two) times daily.    HYDROcodone-acetaminophen (NORCO) 5-325 mg per tablet Take 1 tablet by mouth every 6 (six) hours as needed for Pain.    insulin glargine U-100, Lantus, (LANTUS SOLOSTAR U-100 INSULIN) 100 unit/mL (3 mL) InPn pen Inject 30 Units into the skin 2 (two) times daily.    ketoconazole (NIZORAL) 200 mg Tab Take ½ tablet (100 mg total) by mouth once daily.    lancets (MICROLET LANCET) Misc 100 lancets by Misc.(Non-Drug; Combo Route) route 4 (four) times daily before meals and nightly.    magnesium oxide (MAG-OX) 400 mg (241.3 mg magnesium) tablet Take 1 tablet (400 mg total) by mouth once daily.    metoprolol succinate (TOPROL-XL) 25 MG 24 hr tablet Take 1 tablet (25 mg total) by mouth once daily.    multivitamin (THERAGRAN) tablet Take 1 tablet by mouth once daily.    mycophenolate (CELLCEPT) 250 mg Cap Take 2 capsules (500 mg total) by mouth 2 (two) times daily.    NOVOLOG FLEXPEN U-100 INSULIN 100 unit/mL (3 mL) InPn pen Inject 25 Units into the skin 3 (three) times daily with meals.    ondansetron (ZOFRAN) 4 MG tablet Take 1 " tablet (4 mg total) by mouth every 6 (six) hours.    ondansetron (ZOFRAN-ODT) 4 MG TbDL Dissolve 1 tablet (4 mg total) by mouth every 8 (eight) hours as needed (Nausea/vomiting).    potassium chloride SA (K-DUR,KLOR-CON) 20 MEQ tablet Take 2 tablets (40 mEq total) by mouth once daily.    predniSONE (DELTASONE) 5 MG tablet Take 1 tablet (5 mg total) by mouth once daily.    rosuvastatin (CRESTOR) 40 MG Tab Take 1 tablet (40 mg total) by mouth once daily.    sacubitriL-valsartan (ENTRESTO)  mg per tablet Take 1 tablet by mouth 2 (two) times daily.    semaglutide (OZEMPIC) 2 mg/dose (8 mg/3 mL) PnIj Inject 2 mg into the skin every 7 days.    tacrolimus (PROGRAF) 1 MG Cap Take 5 capsules (5 mg total) by mouth every 12 (twelve) hours.    triamcinolone acetonide 0.1% (KENALOG) 0.1 % ointment Apply topically 2 (two) times daily. Use on bilateral lower legs.     Family History       Problem Relation (Age of Onset)    Diabetes Mother, Sister, Maternal Grandmother    Heart failure Mother, Father    Hypertension Mother    Kidney disease Sister          Tobacco Use    Smoking status: Former     Current packs/day: 0.00     Types: Cigarettes     Quit date: 2013     Years since quittin.6     Passive exposure: Past    Smokeless tobacco: Former     Quit date: 2013    Tobacco comments:     used marijuana since [id number], stopped after started dialysis   Substance and Sexual Activity    Alcohol use: No     Alcohol/week: 0.0 standard drinks of alcohol    Drug use: Not Currently     Comment:      Sexual activity: Never     Review of Systems   Constitutional:  Positive for activity change, appetite change and fatigue. Negative for chills and fever.   HENT: Negative.     Eyes: Negative.    Respiratory:  Positive for shortness of breath.    Cardiovascular: Negative.    Gastrointestinal:  Positive for blood in stool.   Endocrine: Negative.    Genitourinary:  Positive for decreased urine volume and hematuria.    Musculoskeletal:  Positive for gait problem.   Skin: Negative.    Allergic/Immunologic: Negative.    Neurological:  Positive for weakness.   Hematological: Negative.    Psychiatric/Behavioral:  Positive for decreased concentration. The patient is nervous/anxious.      Objective:     Vital Signs (Most Recent):  Temp: 97.8 °F (36.6 °C) (01/12/25 1713)  Pulse: (!) 113 (01/12/25 1713)  Resp: 18 (01/12/25 1713)  BP: 138/65 (01/12/25 1713)  SpO2: 96 % (01/12/25 1713) Vital Signs (24h Range):  Temp:  [97.6 °F (36.4 °C)-97.8 °F (36.6 °C)] 97.8 °F (36.6 °C)  Pulse:  [108-125] 113  Resp:  [18-24] 18  SpO2:  [96 %-100 %] 96 %  BP: ()/(46-67) 138/65     Weight: 126 kg (277 lb 12.5 oz)  Body mass index is 43.51 kg/m².     Physical Exam  Vitals and nursing note reviewed.   Constitutional:       General: He is not in acute distress.     Appearance: He is obese. He is ill-appearing. He is not toxic-appearing.      Comments: Frail elderly man, appears older than his age, AAOx3, speech slow   Neurological:      Mental Status: He is alert.                Results for orders placed or performed during the hospital encounter of 01/12/25   CBC W/ AUTO DIFFERENTIAL    Collection Time: 01/12/25  6:39 AM   Result Value Ref Range    WBC 3.67 (L) 3.90 - 12.70 K/uL    RBC 3.83 (L) 4.60 - 6.20 M/uL    Hemoglobin 9.5 (L) 14.0 - 18.0 g/dL    Hematocrit 32.6 (L) 40.0 - 54.0 %    MCV 85 82 - 98 fL    MCH 24.8 (L) 27.0 - 31.0 pg    MCHC 29.1 (L) 32.0 - 36.0 g/dL    RDW 13.8 11.5 - 14.5 %    Platelets 245 150 - 450 K/uL    MPV 10.2 9.2 - 12.9 fL    Immature Granulocytes CANCELED 0.0 - 0.5 %    Immature Grans (Abs) CANCELED 0.00 - 0.04 K/uL    Lymph # CANCELED 1.0 - 4.8 K/uL    Mono # CANCELED 0.3 - 1.0 K/uL    Eos # CANCELED 0.0 - 0.5 K/uL    Baso # CANCELED 0.00 - 0.20 K/uL    nRBC 0 0 /100 WBC    Gran % 47.0 38.0 - 73.0 %    Lymph % 27.0 18.0 - 48.0 %    Mono % 26.0 (H) 4.0 - 15.0 %    Eosinophil % 0.0 0.0 - 8.0 %    Basophil % 0.0 0.0 - 1.9  %    Platelet Estimate Clumped (A)     Poik Slight     Tear Drop Cells Occasional     Schistocytes Present     Large/Giant Platelets Present     Differential Method Manual    Comp. Metabolic Panel    Collection Time: 01/12/25  6:39 AM   Result Value Ref Range    Sodium 141 136 - 145 mmol/L    Potassium 6.7 (HH) 3.5 - 5.1 mmol/L    Chloride 105 95 - 110 mmol/L    CO2 23 23 - 29 mmol/L    Glucose 146 (H) 70 - 110 mg/dL    BUN 82 (H) 8 - 23 mg/dL    Creatinine 4.3 (H) 0.5 - 1.4 mg/dL    Calcium 9.8 8.7 - 10.5 mg/dL    Total Protein 6.2 6.0 - 8.4 g/dL    Albumin 2.3 (L) 3.5 - 5.2 g/dL    Total Bilirubin 0.5 0.1 - 1.0 mg/dL    Alkaline Phosphatase 96 40 - 150 U/L    AST 17 10 - 40 U/L    ALT 11 10 - 44 U/L    eGFR 14 (A) >60 mL/min/1.73 m^2    Anion Gap 13 8 - 16 mmol/L   Lipase    Collection Time: 01/12/25  6:39 AM   Result Value Ref Range    Lipase 4 4 - 60 U/L   Lactic acid, plasma    Collection Time: 01/12/25  6:39 AM   Result Value Ref Range    Lactate (Lactic Acid) 1.1 0.5 - 2.2 mmol/L   Procalcitonin    Collection Time: 01/12/25  6:39 AM   Result Value Ref Range    Procalcitonin 2.01 (H) <0.25 ng/mL   Magnesium    Collection Time: 01/12/25  6:39 AM   Result Value Ref Range    Magnesium 2.9 (H) 1.6 - 2.6 mg/dL   CPK    Collection Time: 01/12/25  6:39 AM   Result Value Ref Range    CPK 55 20 - 200 U/L   Brain natriuretic peptide    Collection Time: 01/12/25  6:39 AM   Result Value Ref Range     (H) 0 - 99 pg/mL   EKG 12-lead    Collection Time: 01/12/25  7:01 AM   Result Value Ref Range    QRS Duration 148 ms    OHS QTC Calculation 544 ms   Urinalysis, Reflex to Urine Culture Urine, Catheterized    Collection Time: 01/12/25  7:09 AM    Specimen: Urine   Result Value Ref Range    Specimen UA Urine, Catheterized     Color, UA Yellow Yellow, Straw, Nikki    Appearance, UA Hazy (A) Clear    pH, UA 6.0 5.0 - 8.0    Specific Gravity, UA 1.015 1.005 - 1.030    Protein, UA 1+ (A) Negative    Glucose, UA Negative  Negative    Ketones, UA Negative Negative    Bilirubin (UA) Negative Negative    Occult Blood UA Negative Negative    Nitrite, UA Negative Negative    Urobilinogen, UA Negative <2.0 EU/dL    Leukocytes, UA Negative Negative   Urinalysis Microscopic    Collection Time: 01/12/25  7:09 AM   Result Value Ref Range    RBC, UA 3 0 - 4 /hpf    WBC, UA 11 (H) 0 - 5 /hpf    Bacteria Rare None-Occ /hpf    Squam Epithel, UA 2 /hpf    Hyaline Casts, UA 0 0-1/lpf /lpf    Microscopic Comment SEE COMMENT    POCT glucose    Collection Time: 01/12/25  7:39 AM   Result Value Ref Range    POCT Glucose 159 (H) 70 - 110 mg/dL   Type & Screen    Collection Time: 01/12/25  7:53 AM   Result Value Ref Range    Group & Rh B POS     Indirect Jimmy NEG     Specimen Outdate 01/15/2025 23:59    Occult blood x 1, stool    Collection Time: 01/12/25  8:00 AM    Specimen: Stool   Result Value Ref Range    Occult Blood Positive (A) Negative   POCT glucose    Collection Time: 01/12/25  8:31 AM   Result Value Ref Range    POCT Glucose 265 (H) 70 - 110 mg/dL   Basic metabolic panel    Collection Time: 01/12/25  8:44 AM   Result Value Ref Range    Sodium 139 136 - 145 mmol/L    Potassium 5.4 (H) 3.5 - 5.1 mmol/L    Chloride 105 95 - 110 mmol/L    CO2 21 (L) 23 - 29 mmol/L    Glucose 246 (H) 70 - 110 mg/dL    BUN 80 (H) 8 - 23 mg/dL    Creatinine 4.3 (H) 0.5 - 1.4 mg/dL    Calcium 9.6 8.7 - 10.5 mg/dL    Anion Gap 13 8 - 16 mmol/L    eGFR 14 (A) >60 mL/min/1.73 m^2   Clostridium difficile EIA    Collection Time: 01/12/25 10:21 AM    Specimen: Stool   Result Value Ref Range    C. diff Antigen Negative Negative    C difficile Toxins A+B, EIA Negative Negative   Rotavirus antigen, stool    Collection Time: 01/12/25 10:21 AM   Result Value Ref Range    Rotavirus Negative Negative   CBC auto differential    Collection Time: 01/12/25 10:56 AM   Result Value Ref Range    WBC 3.14 (L) 3.90 - 12.70 K/uL    RBC 3.42 (L) 4.60 - 6.20 M/uL    Hemoglobin 8.6 (L) 14.0  - 18.0 g/dL    Hematocrit 29.1 (L) 40.0 - 54.0 %    MCV 85 82 - 98 fL    MCH 25.1 (L) 27.0 - 31.0 pg    MCHC 29.6 (L) 32.0 - 36.0 g/dL    RDW 13.9 11.5 - 14.5 %    Platelets 223 150 - 450 K/uL    MPV 10.1 9.2 - 12.9 fL    Immature Granulocytes CANCELED 0.0 - 0.5 %    Immature Grans (Abs) CANCELED 0.00 - 0.04 K/uL    nRBC 0 0 /100 WBC    Gran % 42.0 38.0 - 73.0 %    Lymph % 24.0 18.0 - 48.0 %    Mono % 33.0 (H) 4.0 - 15.0 %    Eosinophil % 1.0 0.0 - 8.0 %    Basophil % 0.0 0.0 - 1.9 %    Platelet Estimate Clumped (A)     Poik Slight     Ovalocytes Occasional     Tear Drop Cells Occasional     Schistocytes Present     Large/Giant Platelets Present     Differential Method Manual    POCT glucose    Collection Time: 01/12/25  5:15 PM   Result Value Ref Range    POCT Glucose 362 (H) 70 - 110 mg/dL     *Note: Due to a large number of results and/or encounters for the requested time period, some results have not been displayed. A complete set of results can be found in Results Review.      Significant Labs: All pertinent labs within the past 24 hours have been reviewed.    Imaging Results              US Transplant Kidney With Doppler (Final result)  Result time 01/12/25 14:00:36   Procedure changed from US Retroperitoneal Complete     Final result by Joe Leach MD (01/12/25 14:00:36)                   Impression:      1.  Interval increase in main transplant renal artery velocities, currently measuring 170 centimeters/second (previously measuring 146 centimeters/second).  There is also interval increase in range of resistive indices in the segmental arteries, ranging between 0.82 and 0.88 (previously measuring 0.73-0.76).  This can be seen with infectious or inflammatory process, or early rejection changes.  Negative for hydronephrosis.  Negative for renal lesions.      Electronically signed by: Joe Leach MD  Date:    01/12/2025  Time:    14:00               Narrative:    EXAMINATION:  US TRANSPLANT KIDNEY WITH  DOPPLER    CLINICAL HISTORY:  ANGELITO in a transplant;    TECHNIQUE:  Transplant renal ultrasound of the right lower quadrant with color flow doppler and duplex analysis.    COMPARISON:  December 6, 2021    FINDINGS:  A transplanted kidney measures 9.5 centimeters in length.  Normal perfusion.  There is no hydronephrosis.No fluid collections.    Resistive indices ranged from 0.82 to 0.88.  Velocity in main renal artery is 170 cm/sec and the renal artery/iliac ratio is 0.9.  The main renal vein is patent.    The urinary bladder is contracted and thick walled.                                       X-Ray Chest AP Portable (Final result)  Result time 01/12/25 07:00:01      Final result by Joe Leach MD (01/12/25 07:00:01)                   Impression:      1.  Negative for acute process involving the chest.    2.  Stable findings as noted above.      Electronically signed by: Joe Leach MD  Date:    01/12/2025  Time:    07:00               Narrative:    EXAMINATION:  XR CHEST AP PORTABLE    CLINICAL HISTORY:  Weakness    COMPARISON:  August 22, 2023    FINDINGS:  The study is lordotic in position.  Chronically low lung volumes with elevation of the right hemidiaphragm.  The lungs are free of new pulmonary opacity.  The cardiac silhouette size is borderline enlarged.  The trachea is midline and the mediastinal width is normal. Negative for focal infiltrate, effusion or pneumothorax. Pulmonary vasculature is normal. Negative for osseous abnormalities. Convex right curvature of the thoracic spine with marginal spondylosis.  Tortuous aorta with calcifications of the aortic knob.  Stable vascular stent in the right axilla.                                       X-Ray Abdomen Flat And Erect (Final result)  Result time 01/12/25 06:56:43      Final result by Joe Leach MD (01/12/25 06:56:43)                   Impression:      1.  Negative for acute process.      Electronically signed by: Joe Leach  MD  Date:    01/12/2025  Time:    06:56               Narrative:    EXAMINATION:  XR ABDOMEN FLAT AND ERECT    CLINICAL HISTORY:  Vomiting, unspecified    TECHNIQUE:  Flat and erect AP views of the abdomen were performed.    COMPARISON:  Abdomen and pelvis CT scan from April 14, 2024    FINDINGS:  Normal bowel gas pattern.  Negative for free air.    Stable degenerative changes of the spine and pelvis.  Lung bases are clear.                                       Significant Imaging: I have reviewed all pertinent imaging results/findings within the past 24 hours.  Assessment/Plan:     Melena  +ve melena reported at home and Stool Heme +ve here with a drop in H/H here from 11 to 9.5 and then to 8.6. no hematemesis. Pt was on Eliquis and ASA- on hold  GI Consulted  Protonix      Hyperkalemia  Hyperkalemia is likely due to Increased potassium intake.The patients most recent potassium results are listed below.  Recent Labs     01/12/25  0639 01/12/25  0844 01/13/25  0448   K 6.7* 5.4* 6.1*  6.1*     Plan  - Monitor for arrhythmias with EKG and/or continuous telemetry.   - Treat the hyperkalemia with Potassium Binders, Calcium gluconate, IV insulin and dextrose, Furosemide, and Sodium Bicarbonate.   - Monitor potassium: Every 12 hours  - The patient's hyperkalemia is worsening. Will continue current treatment    Pt was reportedly drinking lots of OJ ast home for last 3 days  Nephrology on board            CKD (chronic kidney disease) stage 4, GFR 15-29 ml/min  Creatine stable for now. BMP reviewed- noted Estimated Creatinine Clearance: 19.2 mL/min (A) (based on SCr of 4.5 mg/dL (H)). according to latest data. Based on current GFR, CKD stage is stage 4 - GFR 15-29.  Monitor UOP and serial BMP and adjust therapy as needed. Renally dose meds. Avoid nephrotoxic medications and procedures.  Pt is d/p Renal Transplant in 2015, on Prograf and Cellcept    Anemia associated with chronic renal failure  Anemia is likely due to acute  blood loss which was from CKD, s/p Renal transplant and chronic disease due to Chronic Kidney Disease. Most recent hemoglobin and hematocrit are listed below.  Recent Labs     01/12/25  1056 01/13/25  0056 01/13/25  0448   HGB 8.6* 8.7* 8.7*   HCT 29.1* 29.3* 29.0*     Plan  - Monitor serial CBC: Every 12 hours  - Transfuse PRBC if patient becomes hemodynamically unstable, symptomatic or H/H drops below 7/21.  - Patient has not received any PRBC transfusions to date  - Patient's anemia is currently stable  -       VTE Risk Mitigation (From admission, onward)           Ordered     Reason for No Pharmacological VTE Prophylaxis  Once        Question:  Reasons:  Answer:  Active Bleeding    01/12/25 1729     IP VTE HIGH RISK PATIENT  Once         01/12/25 1729     Place sequential compression device  Until discontinued         01/12/25 1729                         Jaymie Medley MD  Department of Hospital Medicine  Formerly [facility] - Telemetry ([facility])

## 2025-01-13 NOTE — CONSULTS
[facility] - Telemetry ([facility])  Gastroenterology  Consult Note    Patient Name: Mitch Whittaker  MRN: [id number]  Admission Date: 1/12/2025  Hospital Length of Stay: 1 days  Code Status: Full Code   Attending Provider: Jaymie Medley MD   Consulting Provider: Hilario Dahl PA-C  Primary Care Physician: Valery Caal MD  Principal Problem:<principal problem not specified>    Inpatient consult to Gastroenterology  Consult performed by: Hilario Dahl PA-C  Consult ordered by: Gemma Bright MD  Reason for consult: GI bleed      Subjective:     HPI:  The patient has a history of CHF and CKD s/p kidney transplant in 2015. Over the last week, the patient has had knee pain and weakness. He was unable to get out of his chair at home where he reportedly sat for three days without the ability to get to the restroom. He described nausea, vomiting and diarrhea. In the ER, he was noted to have a potassium of 6.7, BUN 83, creatinine 4.3 (baseline 2.5), Mg 2.9, procal 2.0. Nephrology was consulted medications adjusted and workup ongoing. He was also noted to have a Hgb of 9.5 which is down from 11 six days prior. Trace black stool on rectal exam per ER physician. FOBT negative but other stool studies negative so far. He denies NSAID use. He take Eliquis, last dose 01/11/25. CXR and AXR unremarkable. Today he reports feeling better. He hasn't had anymore vomiting or diarrhea since admit. He is currently eating a regular breakfast without difficulty. He denies hematemesis at home but has a spit cup next to him with a small amount of blood. He reports having a cough. He denies abdominal pain, hematochezia or melena but told the ER physician he had some black stools at home.     He had an EGD in March 2024 for iron deficiency anemia. He had a small amount of retained food but was otherwise normal. He had a colonoscopy in June 2023 for chronic diarrhea. One inflammatory polyp was removed and random biopsies were normal.      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 RNA NEGATIVE 5/2015, 2021, 2022    History of colon polyps     HPTH (hyperparathyroidism)     Hyperlipidemia     Hypertension associated with stage 3 chronic kidney disease due to type 2 diabetes mellitus     LBBB (left bundle branch block) 12/20/2021    Morbid obesity with BMI of 45.0-49.9, adult     Nephrolithiasis 6/7/2013    PCO (posterior capsular opacification), left 03/04/2019    Proteinuria     resolved s/p transplant    S/P kidney transplant     Sleep apnea     Type 2 diabetes, uncontrolled, with retinopathy     Type II diabetes mellitus with renal manifestations        Past Surgical History:   Procedure Laterality Date    CARDIAC CATHETERIZATION  01/01/2008    normal coronary    CARPAL TUNNEL RELEASE Right 12/01/2023    Procedure: RELEASE, CARPAL TUNNEL;  Surgeon: Noel Almonte MD;  Location: [facility] OR;  Service: Orthopedics;  Laterality: Right;    CARPAL TUNNEL RELEASE Left 7/18/2024    Procedure: RELEASE, CARPAL TUNNEL;  Surgeon: Noel Almonte MD;  Location: [facility] OR;  Service: Orthopedics;  Laterality: Left;    COLONOSCOPY N/A 04/05/2018    Procedure: COLONOSCOPY;  Surgeon: Chava HERNÁNDEZ  MD Yg;  Location: [facility];  Service: Endoscopy;  Laterality: N/A;    COLONOSCOPY N/A 2022    Procedure: COLONOSCOPY;  Surgeon: Alix Puente MD;  Location: [facility];  Service: Endoscopy;  Laterality: N/A;    COLONOSCOPY N/A 2023    Procedure: COLONOSCOPY - rule out CMV  Cardiac clearance/Eliquis hold approval received on 23 per Dr. Meade, cardiology.  Note in encounters.  LB;  Surgeon: Daniella Shah MD;  Location: [facility];  Service: Endoscopy;  Laterality: N/A;    ESOPHAGOGASTRODUODENOSCOPY N/A 2024    Procedure: EGD (ESOPHAGOGASTRODUODENOSCOPY) 3/1-pt cleared, ok to hold eliquis for 3 days;  Surgeon: Daniella Shah MD;  Location: [facility];  Service: Endoscopy;  Laterality: N/A;    KIDNEY TRANSPLANT      RETINAL LASER PROCEDURE         Review of patient's allergies indicates:   Allergen Reactions    Lisinopril Other (See Comments)     Other reaction(s):  cough    Actos  [pioglitazone] Other (See Comments)     Other reaction(s): CHF    Metformin Other (See Comments)     Other reaction(s): renal insuff  Other reaction(s): CHF     Family History       Problem Relation (Age of Onset)    Diabetes Mother, Sister, Maternal Grandmother    Heart failure Mother, Father    Hypertension Mother    Kidney disease Sister          Tobacco Use    Smoking status: Former     Current packs/day: 0.00     Types: Cigarettes     Quit date: 2013     Years since quittin.6     Passive exposure: Past    Smokeless tobacco: Former     Quit date: 2013    Tobacco comments:     used marijuana since [id number], stopped after started dialysis   Substance and Sexual Activity    Alcohol use: No     Alcohol/week: 0.0 standard drinks of alcohol    Drug use: Not Currently     Comment:      Sexual activity: Never     Review of Systems   Constitutional:  Negative for fever.   HENT:  Negative for hearing loss.    Eyes:  Negative for visual disturbance.   Respiratory:   Negative for cough and shortness of breath.    Cardiovascular:  Positive for chest pain (mild nagging pain in midline) and leg swelling. Negative for palpitations.   Gastrointestinal:         As per HPI.   Genitourinary:  Negative for difficulty urinating, dysuria, frequency and hematuria.   Musculoskeletal:  Negative for arthralgias and back pain.   Skin:  Negative for color change.   Neurological:  Positive for weakness. Negative for seizures, syncope, numbness and headaches.   Hematological:  Does not bruise/bleed easily.   Psychiatric/Behavioral:  The patient is not nervous/anxious.      Objective:     Vital Signs (Most Recent):  Temp: 100.1 °F (37.8 °C) (01/13/25 0728)  Pulse: (!) 128 (01/13/25 0728)  Resp: 17 (01/13/25 0728)  BP: (!) 135/55 (01/13/25 0728)  SpO2: (!) 91 % (01/13/25 0728) Vital Signs (24h Range):  Temp:  [97.8 °F (36.6 °C)-100.1 °F (37.8 °C)] 100.1 °F (37.8 °C)  Pulse:  [111-128] 128  Resp:  [16-24] 17  SpO2:  [91 %-100 %] 91 %  BP: ()/(46-69) 135/55     Weight: 126 kg (277 lb 12.5 oz) (01/12/25 1713)  Body mass index is 43.51 kg/m².      Intake/Output Summary (Last 24 hours) at 1/13/2025 0839  Last data filed at 1/12/2025 2100  Gross per 24 hour   Intake 240 ml   Output --   Net 240 ml       Lines/Drains/Airways       Peripheral Intravenous Line  Duration                  Hemodialysis AV Fistula Right upper arm -- days         Peripheral IV - Single Lumen 01/12/25 0639 18 G Left Wrist 1 day                     Physical Exam  Constitutional:       General: He is not in acute distress.     Appearance: Normal appearance. He is well-developed.   HENT:      Head: Normocephalic and atraumatic.   Eyes:      Extraocular Movements: Extraocular movements intact.   Cardiovascular:      Rate and Rhythm: Regular rhythm. Tachycardia present.      Heart sounds: Normal heart sounds. No murmur heard.  Pulmonary:      Effort: Pulmonary effort is normal. No respiratory distress.      Breath sounds: Normal  "breath sounds. No wheezing.   Abdominal:      General: Bowel sounds are normal. There is no distension.      Palpations: Abdomen is soft. There is no mass.      Tenderness: There is no abdominal tenderness.   Musculoskeletal:      Cervical back: Normal range of motion and neck supple.      Right lower leg: Edema present.      Left lower leg: Edema present.   Skin:     General: Skin is warm and dry.   Neurological:      Mental Status: He is alert and oriented to person, place, and time.      Cranial Nerves: No cranial nerve deficit.   Psychiatric:         Behavior: Behavior normal.          Significant Labs:  CBC:   Recent Labs   Lab 01/12/25  1056 01/13/25  0056 01/13/25  0448   WBC 3.14* 2.84* 2.96*   HGB 8.6* 8.7* 8.7*   HCT 29.1* 29.3* 29.0*    228 220     CMP:   Recent Labs   Lab 01/12/25  0639 01/12/25  0844 01/13/25  0448   *   < > 267*  261*   CALCIUM 9.8   < > 8.6*  9.6   ALBUMIN 2.3*  --  1.9*   PROT 6.2  --   --       < > 142  141   K 6.7*   < > 6.1*  6.1*   CO2 23   < > 25  21*      < > 108  107   BUN 82*   < > 84*  81*   CREATININE 4.3*   < > 4.3*  4.5*   ALKPHOS 96  --   --    ALT 11  --   --    AST 17  --   --    BILITOT 0.5  --   --     < > = values in this interval not displayed.     Coagulation: No results for input(s): "PT", "INR", "APTT" in the last 48 hours.    Significant Imaging:  Imaging results within the past 24 hours have been reviewed.  Assessment/Plan:     Renal/  Hyperkalemia  Nephrology has been consulted and work up pending.     CKD (chronic kidney disease) stage 4, GFR 15-29 ml/min  Nephrology consult note reviewed.     Oncology  Anemia associated with chronic renal failure  Patient with acute on chronic anemia with reported melena, concern for GI bleeding.   No overt bleeding overnight. Hgb with mild decrease overnight.   Eliquis on hold. Last dose 01/11/25.  May consider EGD during admit but hyperkalemia still an issue and no signs of " active/ongoing bleeding. Will monitor clinically and follow with primary team.   Start Protonix bid.       GI  Melena  See plan under anemia.   No signs of ongoing bleeding. Will monitor while hyperkalemia and renal fxn addressed.         Thank you for your consult. I will follow-up with patient. Please contact us if you have any additional questions.    Hilario Dahl PA-C  Gastroenterology  [facility] - Telemetry ([facility])

## 2025-01-13 NOTE — PLAN OF CARE
A242/A242 SB Whittaker is a 69 y.o.male admitted on 1/12/2025 for <principal problem not specified>   Code Status: Full Code MRN: [id number]   Review of patient's allergies indicates:   Allergen Reactions    Lisinopril Other (See Comments)     Other reaction(s):  cough    Actos  [pioglitazone] Other (See Comments)     Other reaction(s): CHF    Metformin Other (See Comments)     Other reaction(s): renal insuff  Other reaction(s): CHF     Past Medical History:   Diagnosis Date    Acquired renal cyst of left kidney     Anemia associated with chronic renal failure     CAD (coronary artery disease)     nonobstructive 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 RNA NEGATIVE 5/2015, 2021, 2022    History of colon polyps     HPTH (hyperparathyroidism)     Hyperlipidemia     Hypertension associated with stage 3 chronic kidney disease due to type 2 diabetes mellitus     LBBB (left bundle branch block) 12/20/2021    Morbid obesity with BMI of 45.0-49.9, adult     Nephrolithiasis 6/7/2013    PCO (posterior capsular opacification), left 03/04/2019    Proteinuria     resolved s/p transplant    S/P kidney transplant     Sleep apnea     Type 2 diabetes, uncontrolled, with retinopathy     Type II diabetes mellitus with renal manifestations       PRN meds    acetaminophen, 650 mg, Q4H PRN  dextrose 50%, 12.5 g, PRN  dextrose 50%, 25 g, PRN  glucagon (human recombinant), 1 mg,  PRN  glucose, 16 g, PRN  glucose, 24 g, PRN  insulin aspart U-100, 0-5 Units, QID (AC + HS) PRN  melatonin, 6 mg, Nightly PRN  naloxone, 0.02 mg, PRN  ondansetron, 8 mg, Q8H PRN  ondansetron, 4 mg, Q8H PRN  polyethylene glycol, 17 g, Daily PRN  senna-docusate 8.6-50 mg, 2 tablet, Daily PRN      Chart check completed. Will continue plan of care.         [place] Coma Scale Score: 15     Lead Monitored: Lead II Rhythm: sinus tachycardia    Cardiac/Telemetry Box Number: [id number]  VTE Core Measure: (SCDs) Sequential compression device initiated/maintained Last Bowel Movement: 01/13/25  Diet Renal  Voiding Characteristics: incontinence  Dewayne Score: 13  Fall Risk Score: 13  Accucheck []   Freq?      Lines/Drains/Airways       Peripheral Intravenous Line  Duration                  Hemodialysis AV Fistula Right upper arm -- days         Peripheral IV - Single Lumen 01/12/25 0639 18 G Left Wrist 1 day

## 2025-01-13 NOTE — PLAN OF CARE
A242/A242 SB Whittaker is a 69 y.o.male admitted on 1/12/2025 for <principal problem not specified>   Code Status: Full Code MRN: [id number]   Review of patient's allergies indicates:   Allergen Reactions    Lisinopril Other (See Comments)     Other reaction(s):  cough    Actos  [pioglitazone] Other (See Comments)     Other reaction(s): CHF    Metformin Other (See Comments)     Other reaction(s): renal insuff  Other reaction(s): CHF     Past Medical History:   Diagnosis Date    Acquired renal cyst of left kidney     Anemia associated with chronic renal failure     CAD (coronary artery disease)     nonobstructive [facility] 9/14    CHF (congestive heart failure)     Chronic immunosuppression with Prograf and MMF 06/18/2015    Chronic venous insufficiency of lower extremity     CKD (chronic kidney disease) stage 3, GFR 30-59 ml/min     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 RNA NEGATIVE 5/2015, 2021, 2022    History of colon polyps     HPTH (hyperparathyroidism)     Hyperlipidemia     Hypertension associated with stage 3 chronic kidney disease due to type 2 diabetes mellitus     LBBB (left bundle branch block) 12/20/2021    Morbid obesity with BMI of 45.0-49.9, adult     Nephrolithiasis 6/7/2013    PCO (posterior capsular opacification), left 03/04/2019    Proteinuria     resolved s/p transplant    S/P kidney transplant     Sleep apnea     Type 2 diabetes, uncontrolled, with retinopathy     Type II diabetes mellitus with renal manifestations       PRN meds    acetaminophen, 650 mg, Q4H PRN  dextrose 50%, 12.5 g, PRN  dextrose 50%, 25 g, PRN  dextrose 50%, 25 g, PRN  glucagon  (human recombinant), 1 mg, PRN  glucose, 16 g, PRN  glucose, 24 g, PRN  insulin aspart U-100, 0-5 Units, QID (AC + HS) PRN  melatonin, 6 mg, Nightly PRN  naloxone, 0.02 mg, PRN  ondansetron, 8 mg, Q8H PRN  ondansetron, 4 mg, Q8H PRN  polyethylene glycol, 17 g, Daily PRN  senna-docusate 8.6-50 mg, 2 tablet, Daily PRN      Chart check completed. Will continue plan of care.         [place] Coma Scale Score: 15     Lead Monitored: Lead II Rhythm: sinus tachycardia    Cardiac/Telemetry Box Number: [id number]    Last Bowel Movement: 01/12/25  Diet Renal  Voiding Characteristics: incontinence  Dewayne Score: 13  Fall Risk Score: 12  Accucheck [x]   Freq?      Lines/Drains/Airways       Peripheral Intravenous Line  Duration                  Hemodialysis AV Fistula Right upper arm -- days         Peripheral IV - Single Lumen 01/12/25 0455 20 G Left Antecubital <1 day         Peripheral IV - Single Lumen 01/12/25 0639 18 G Left Wrist <1 day

## 2025-01-13 NOTE — ASSESSMENT & PLAN NOTE
Anemia is likely due to acute blood loss which was from CKD, s/p Renal transplant and chronic disease due to Chronic Kidney Disease. Most recent hemoglobin and hematocrit are listed below.  Recent Labs     01/12/25  1056 01/13/25  0056 01/13/25  0448   HGB 8.6* 8.7* 8.7*   HCT 29.1* 29.3* 29.0*     Plan  - Monitor serial CBC: Every 12 hours  - Transfuse PRBC if patient becomes hemodynamically unstable, symptomatic or H/H drops below 7/21.  - Patient has not received any PRBC transfusions to date  - Patient's anemia is currently stable  -

## 2025-01-13 NOTE — ASSESSMENT & PLAN NOTE
Patient with acute on chronic anemia with reported melena, concern for GI bleeding.   No overt bleeding overnight. Hgb with mild decrease overnight.   Eliquis on hold. Last dose 01/11/25.  May consider EGD during admit but hyperkalemia still an issue and no signs of active/ongoing bleeding. Will monitor clinically and follow with primary team.   Start Protonix bid.

## 2025-01-13 NOTE — ASSESSMENT & PLAN NOTE
Hyperkalemia is likely due to Increased potassium intake.The patients most recent potassium results are listed below.  Recent Labs     01/12/25  0639 01/12/25  0844 01/13/25  0448   K 6.7* 5.4* 6.1*  6.1*     Plan  - Monitor for arrhythmias with EKG and/or continuous telemetry.   - Treat the hyperkalemia with Potassium Binders, Calcium gluconate, IV insulin and dextrose, Furosemide, and Sodium Bicarbonate.   - Monitor potassium: Every 12 hours  - The patient's hyperkalemia is worsening. Will continue current treatment    Pt was reportedly drinking lots of OJ ast home for last 3 days  Nephrology on board

## 2025-01-13 NOTE — ASSESSMENT & PLAN NOTE
+ve melena reported at home and Stool Heme +ve here with a drop in H/H here from 11 to 9.5 and then to 8.6. no hematemesis. Pt was on Eliquis and ASA- on hold  GI Consulted  Protonix

## 2025-01-13 NOTE — CONSULTS
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 RNA NEGATIVE 2015, ,     History of colon polyps     HPTH (hyperparathyroidism)     Hyperlipidemia     Hypertension associated with stage 3 chronic kidney disease due to type 2 diabetes mellitus     LBBB (left bundle branch block) 2021    Morbid obesity with BMI of 45.0-49.9, adult     Nephrolithiasis 2013    PCO (posterior capsular opacification), left 2019    Proteinuria     resolved s/p transplant    S/P kidney transplant     Sleep apnea     Type 2 diabetes, uncontrolled, with retinopathy     Type II diabetes mellitus with renal manifestations        Social History     Socioeconomic History    Marital status: Legally     Number of children: 2   Occupational History    Occupation: retired     Employer: Retired   Tobacco Use    Smoking status: Former     Current packs/day: 0.00     Types: Cigarettes     Quit date: 2013     Years since quittin.6     Passive exposure: Past    Smokeless tobacco: Former     Quit date: 2013     Tobacco comments:     used marijuana since 2833-1100, stopped after started dialysis   Substance and Sexual Activity    Alcohol use: No     Alcohol/week: 0.0 standard drinks of alcohol    Drug use: Not Currently     Comment:      Sexual activity: Never   Social History Narrative    . Lives with spouse. Has 2 children. Patient retired as  for [employer]. He has been washing cars.     Social Drivers of Health     Financial Resource Strain: Patient Declined (1/13/2025)    Overall Financial Resource Strain (CARDIA)     Difficulty of Paying Living Expenses: Patient declined   Food Insecurity: Patient Declined (1/13/2025)    Hunger Vital Sign     Worried About Running Out of Food in the Last Year: Patient declined     Ran Out of Food in the Last Year: Patient declined   Transportation Needs: Patient Unable To Answer (1/13/2025)    TRANSPORTATION NEEDS     Transportation : Patient unable to answer   Stress: Patient Declined (1/13/2025)    [employer] [place] [employer] - Occupational Stress Questionnaire     Feeling of Stress : Patient declined   Housing Stability: Patient Declined (1/13/2025)    Housing Stability Vital Sign     Unable to Pay for Housing in the Last Year: Patient declined     Homeless in the Last Year: Patient declined       Precautions:     Allergies as of 01/12/2025 - Reviewed 01/12/2025   Allergen Reaction Noted    Lisinopril Other (See Comments) 04/12/2013    Actos  [pioglitazone] Other (See Comments) 04/12/2013    Metformin Other (See Comments) 04/12/2013       [facility] Assessment Details/Treatment         Consulted to evaluate wounds to BLE's and sacrum.  Mr. Whittaker is a 70 yo male patient admitted 1/12/25 with fatigue, weakness, N&V and diarrhea for several days PTA.  PMH includes:  CHF- EF 40%, anemia, hyperparathyroidism, type 2 DM, s/p kidney transplant 2015 on Prograf and Cellcept, DVT on Eliquis, MARION, HLD, HTN, CKD, Hep C, venous wounds BLE's and arthritis.  This  morning, patient is resting quietly in bed, awake and somewhat alert.  Wife present @ bedside.    Removed gauze wraps from BLE's and cleansed with saline/gauze.  Noted + edema, hemosiderin staining and + scar tissue to BLE's.  Assessed B heels; noted no redness or skin breakdown to either heel @ this time.  --Noted venous ulcer to anterior aspect of LLE w/ mostly red, clean wound base.    --Noted venous ulcer to lateral aspect of RLE w/ pale red/pink wound bed.  Minimal exudate noted only.    Recommend:  will cover/protect using foam border dressings for now.  Patient needs compression; however, uncertain of arterial status.  Sent message to Dr. Medley via [employer] secure chat, requesting that he order arterial u/s with MARÍA ELENA's of BLE's to R/O arterial insufficiency.  May be able to apply compression wraps to BLE's depending on results of arterial workup.  Encouraged use of offloading heel boots to BLE's @ all times when patient is in bed.  Assisted to turn patient onto his L side.  Cleansed small amount of stool (liquid, green) from skin to buttocks/perirectal region and small blood clots from penis using soft bath cloths.  --Noted MASD to R gluteal cleft.  Partial thickness wounds w/ pale red/pink wound bed.  Periwound intact.  No exudate noted.  Recommend:  use of barrier paste to be applied daily after bath & prn each episode of incontinence.  I did remove adult brief to allow for increased air flow to patient's skin via low air-loss pump which is currently in use.  Discussed use of either urinal OR external urinary catheter using condom catheter OR QiVi with patient, wife and primary nurse in order to avoid further moisture damage to patient's skin.  Will initiate order set for pressure injury prevention.  Will place order for Comfort Glide sheet to be used in order to assist with re-positioning in bed.  Plan f/u in am.          01/13/25 1040   WOCN Assessment   WOCN Total Time (mins) 45   Visit Date 01/13/25   Visit  Time 1040   Consult Type New   WOCN Speciality Wound   Wound venous   Number of Wounds 2   Intervention applied;chart review;coordination of care   Teaching on-going   Skin Interventions   Device Skin Pressure Protection absorbent pad utilized/changed   Pressure Reduction Devices alternating pressure pump (TANYA);pressure-redistributing mattress utilized;heel offloading device utilized   Pressure Reduction Techniques weight shift assistance provided;heels elevated off bed   Skin Protection incontinence pads utilized   Positioning   Body Position turned;head facing, left   Head of Bed (HOB) Positioning HOB elevated   Positioning/Transfer Devices wedge;pillows;in use   Pressure Injury Prevention    Check Moisture Management Pad Done   Sacral Foam Dressing Patient incontinent, barrier cream applied   Heel protection technique Heel boot   Check Medical Devices Done        Altered Skin Integrity 12/01/23 0948 Left anterior;lower Leg   Date First Assessed/Time First Assessed: 12/01/23 0948   Altered Skin Integrity Present on Admission - Did Patient arrive to the hospital with altered skin?: yes  Side: Left  Orientation: anterior;lower  Location: Leg   Wound Image    Dressing Appearance Open to air   Drainage Amount Scant   Drainage Characteristics/Odor Serous   Appearance Pink;Red;Yellow;Fibrin   Tissue loss description Full thickness   Red (%), Wound Tissue Color 90 %   Yellow (%), Wound Tissue Color 10 %   Periwound Area Intact;Edematous;Hemosiderin Staining;Scar tissue   Wound Length (cm) 1.6 cm   Wound Width (cm) 1 cm   Wound Depth (cm) 0.1 cm   Wound Volume (cm^3) 0.16 cm^3   Wound Surface Area (cm^2) 1.6 cm^2   Care Cleansed with:;Sterile normal saline   Dressing Applied;Foam;Island/border   Periwound Care Skin barrier film applied   Dressing Change Due 01/15/25        Wound 01/12/25 1713 Moisture associated dermatitis Right Gluteal cleft   Date First Assessed/Time First Assessed: 01/12/25 1713   Present on Original  Admission: Yes  Primary Wound Type: Moisture associated dermatitis  Side: Right  Location: Gluteal cleft   Wound Image    Dressing Appearance Open to air   Drainage Amount None   Appearance Pink;Red   Tissue loss description Partial thickness   Red (%), Wound Tissue Color 100 %   Periwound Area Intact   Care Cleansed with:;Sterile normal saline   Periwound Care Moisture barrier applied        Wound 01/13/25 1040 Venous Ulcer Right lower;lateral Leg   Date First Assessed/Time First Assessed: 01/13/25 1040   Present on Original Admission: Yes  Primary Wound Type: Venous Ulcer  Side: Right  Orientation: lower;lateral  Location: Leg   Wound Image    Dressing Appearance Open to air   Drainage Amount Scant   Drainage Characteristics/Odor Serous   Appearance Pink;Red   Tissue loss description Partial thickness   Red (%), Wound Tissue Color 100 %   Periwound Area Intact;Edematous;Hemosiderin Staining   Wound Length (cm) 1.2 cm   Wound Width (cm) 0.5 cm   Wound Depth (cm) 0.1 cm   Wound Volume (cm^3) 0.06 cm^3   Wound Surface Area (cm^2) 0.6 cm^2   Care Cleansed with:;Sterile normal saline   Dressing Applied;Foam;Island/border   Periwound Care Skin barrier film applied   Dressing Change Due 01/15/25       01/13/2025

## 2025-01-13 NOTE — PROGRESS NOTES
Nephrology Progress Note     History of Present Illness       The patient is a 69 y.o. male with a hx of previous ESRD likely related to diabetes and hypertension that was on dialysis several years prior to receiving a living related kidney transplant from his daughter in 2015.  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  He is followed by Dr. Gonsalez of [facility] seen last on 09/24/2024 at which time his serum creatinine was 2.2.  He was seen in the emergency department on 01/06/2025 complaining of increasing lower extremity edema.  At that time his serum creatinine was 2.8.  He returns to the emergency department 01/12/2025 with persistent lower extremity edema and associated blistering.  He also complains of bilateral knee pain to the point where he is unable to ambulate.  He attributes this to the change in weather.  His admission laboratory reveals a serum creatinine now up to 4.3 with a potassium of 5.4.  Nephrology has been asked to see and evaluate the patient.  As an outpatient he is chronically on Lasix 40 mg twice a day.  He denies the use of nonsteroidal anti-inflammatory drugs.  He denies dysuria hematuria or difficulty voiding.  He is chronically on Entresto.      Interval History   Overnight/currently:  Patient denies any chest pain, short of breath, nausea or vomiting.  He had a Ann catheter placed this a.m. and having gross hematuria.        Allergies:    is allergic to lisinopril, actos  [pioglitazone], and metformin.    Current medications:   Scheduled Meds:   mycophenolate  500 mg Oral BID    pantoprazole  40 mg Oral BID    sodium chloride 0.9%  10 mL Intravenous Q8H    sodium zirconium cyclosilicate  5 g Oral Daily    tacrolimus  5 mg Oral BID     Continuous  "Infusions:  PRN Meds:.  Current Facility-Administered Medications:     acetaminophen, 650 mg, Oral, Q4H PRN    dextrose 50%, 12.5 g, Intravenous, PRN    dextrose 50%, 25 g, Intravenous, PRN    glucagon (human recombinant), 1 mg, Intramuscular, PRN    glucose, 16 g, Oral, PRN    glucose, 24 g, Oral, PRN    insulin aspart U-100, 0-5 Units, Subcutaneous, QID (AC + HS) PRN    melatonin, 6 mg, Oral, Nightly PRN    naloxone, 0.02 mg, Intravenous, PRN    ondansetron, 8 mg, Oral, Q8H PRN    ondansetron, 4 mg, Intravenous, Q8H PRN    polyethylene glycol, 17 g, Oral, Daily PRN    senna-docusate 8.6-50 mg, 2 tablet, Oral, Daily PRN     Physical Examination     VS/Measurements    /61 (BP Location: Left arm, Patient Position: Lying)   Pulse (!) 112   Temp 98.5 °F (36.9 °C) (Oral)   Resp 17   Ht 5' 7" (1.702 m)   Wt 126 kg (277 lb 12.5 oz)   SpO2 (!) 94%   BMI 43.51 kg/m²         General:  Moderately built, not in any distress.  Neck:  Supple,   Respiratory: Non-labored,  Lungs are clear to auscultation.    Cardiovascular:  Normal rate, Regular rhythm.   Abdomen:  Soft, Non-tender, Normal bowel sounds.   Muskuloskeletal:   pedal edema +.          Laboratory Results   Today's Lab Results :    Recent Results (from the past 24 hours)   POCT glucose    Collection Time: 01/12/25  5:15 PM   Result Value Ref Range    POCT Glucose 362 (H) 70 - 110 mg/dL   POCT glucose    Collection Time: 01/12/25 10:02 PM   Result Value Ref Range    POCT Glucose 321 (H) 70 - 110 mg/dL   CBC Auto Differential    Collection Time: 01/13/25 12:56 AM   Result Value Ref Range    WBC 2.84 (L) 3.90 - 12.70 K/uL    RBC 3.56 (L) 4.60 - 6.20 M/uL    Hemoglobin 8.7 (L) 14.0 - 18.0 g/dL    Hematocrit 29.3 (L) 40.0 - 54.0 %    MCV 82 82 - 98 fL    MCH 24.4 (L) 27.0 - 31.0 pg    MCHC 29.7 (L) 32.0 - 36.0 g/dL    RDW 13.9 11.5 - 14.5 %    Platelets 228 150 - 450 K/uL    MPV 10.4 9.2 - 12.9 fL    Immature Granulocytes CANCELED 0.0 - 0.5 %    Immature Grans " (Abs) CANCELED 0.00 - 0.04 K/uL    Lymph # CANCELED 1.0 - 4.8 K/uL    Mono # CANCELED 0.3 - 1.0 K/uL    Eos # CANCELED 0.0 - 0.5 K/uL    Baso # CANCELED 0.00 - 0.20 K/uL    nRBC 0 0 /100 WBC    Gran % 66.0 38.0 - 73.0 %    Lymph % 7.0 (L) 18.0 - 48.0 %    Mono % 25.0 (H) 4.0 - 15.0 %    Eosinophil % 0.0 0.0 - 8.0 %    Basophil % 0.0 0.0 - 1.9 %    Bands 2.0 %    Platelet Estimate Appears normal     Aniso Slight     Ovalocytes Occasional     Differential Method Manual    Renal Function Panel    Collection Time: 01/13/25  4:48 AM   Result Value Ref Range    Glucose 261 (H) 70 - 110 mg/dL    Sodium 141 136 - 145 mmol/L    Potassium 6.1 (H) 3.5 - 5.1 mmol/L    Chloride 107 95 - 110 mmol/L    CO2 21 (L) 23 - 29 mmol/L    BUN 81 (H) 8 - 23 mg/dL    Calcium 9.6 8.7 - 10.5 mg/dL    Creatinine 4.5 (H) 0.5 - 1.4 mg/dL    Albumin 1.9 (L) 3.5 - 5.2 g/dL    Phosphorus 4.5 2.7 - 4.5 mg/dL    eGFR 13 (A) >60 mL/min/1.73 m^2    Anion Gap 13 8 - 16 mmol/L   Basic Metabolic Panel (BMP)    Collection Time: 01/13/25  4:48 AM   Result Value Ref Range    Sodium 142 136 - 145 mmol/L    Potassium 6.1 (H) 3.5 - 5.1 mmol/L    Chloride 108 95 - 110 mmol/L    CO2 25 23 - 29 mmol/L    Glucose 267 (H) 70 - 110 mg/dL    BUN 84 (H) 8 - 23 mg/dL    Creatinine 4.3 (H) 0.5 - 1.4 mg/dL    Calcium 8.6 (L) 8.7 - 10.5 mg/dL    Anion Gap 9 8 - 16 mmol/L    eGFR 14 (A) >60 mL/min/1.73 m^2   CBC with Automated Differential    Collection Time: 01/13/25  4:48 AM   Result Value Ref Range    WBC 2.96 (L) 3.90 - 12.70 K/uL    RBC 3.45 (L) 4.60 - 6.20 M/uL    Hemoglobin 8.7 (L) 14.0 - 18.0 g/dL    Hematocrit 29.0 (L) 40.0 - 54.0 %    MCV 84 82 - 98 fL    MCH 25.2 (L) 27.0 - 31.0 pg    MCHC 30.0 (L) 32.0 - 36.0 g/dL    RDW 13.9 11.5 - 14.5 %    Platelets 220 150 - 450 K/uL    MPV 10.2 9.2 - 12.9 fL    Immature Granulocytes CANCELED 0.0 - 0.5 %    Immature Grans (Abs) CANCELED 0.00 - 0.04 K/uL    nRBC 0 0 /100 WBC    Gran % 63.0 38.0 - 73.0 %    Lymph % 15.0 (L)  18.0 - 48.0 %    Mono % 22.0 (H) 4.0 - 15.0 %    Eosinophil % 0.0 0.0 - 8.0 %    Basophil % 0.0 0.0 - 1.9 %    Platelet Estimate Appears normal     Aniso Slight     Ovalocytes Occasional     Differential Method Manual    POCT glucose    Collection Time: 01/13/25  5:41 AM   Result Value Ref Range    POCT Glucose 262 (H) 70 - 110 mg/dL   POCT glucose    Collection Time: 01/13/25  1:36 PM   Result Value Ref Range    POCT Glucose 306 (H) 70 - 110 mg/dL       LABS:  Reviewed Yes      Intake/Output Summary (Last 24 hours) at 1/13/2025 1400  Last data filed at 1/12/2025 2100  Gross per 24 hour   Intake 240 ml   Output --   Net 240 ml       Assessment and Plan     ANGELITO on CKD stage 4 secondary to possible IV VD.  I will start him on IV fluids and see if his creatinine would improve.  I will hold his Prograf level.  AHis kidney transplant ultrasound did not reveal any obstruction continue revealed increase in the main renal artery velocities.      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     Lower extremity edema with skin changes consistent with venous stasis     Hyperkalemia.  His Entresto and the potassium supplements on hold.  I will increase the Lokelma dose.     Melena.  GI considering EGD in a.m..     Hypertension with renal disease.  His blood pressure is stable.     Diabetes with renal disease     CAD/CHF with diminished ejection fraction followed by Dr. Man of Cardiology.  His EF was 35% in 11/2024.  Watch closely for any fluid overload with gentle IV fluids.                ________________________________________________  Yoandy Bennett

## 2025-01-13 NOTE — HPI
The patient has a history of CHF and CKD s/p kidney transplant in 2015. Over the last week, the patient has had knee pain and weakness. He was unable to get out of his chair at home where he reportedly sat for three days without the ability to get to the restroom. He described nausea, vomiting and diarrhea. In the ER, he was noted to have a potassium of 6.7, BUN 83, creatinine 4.3 (baseline 2.5), Mg 2.9, procal 2.0. Nephrology was consulted medications adjusted and workup ongoing. He was also noted to have a Hgb of 9.5 which is down from 11 six days prior. Trace black stool on rectal exam per ER physician. FOBT negative but other stool studies negative so far. He denies NSAID use. He take Eliquis, last dose 01/11/25. CXR and AXR unremarkable. Today he reports feeling better. He hasn't had anymore vomiting or diarrhea since admit. He is currently eating a regular breakfast without difficulty. He denies hematemesis at home but has a spit cup next to him with a small amount of blood. He reports having a cough. He denies abdominal pain, hematochezia or melena but told the ER physician he had some black stools at home.     He had an EGD in March 2024 for iron deficiency anemia. He had a small amount of retained food but was otherwise normal. He had a colonoscopy in June 2023 for chronic diarrhea. One inflammatory polyp was removed and random biopsies were normal.

## 2025-01-13 NOTE — PLAN OF CARE
Pt AAOx4. VSS. Pt remained free of falls this shift.bed alarm on. BLE pain 10/10 with turns q 2. Medications administered as ordered. Pt is sinus tach on monitor. Pt had an eposide of hr in 130's sustained a couple minutes. Provider notified. Pt has hd serioes of blood BM dark and tarry. Provider ordered to check h/h. Hourly rounding completed. Pt instructed to call for assistance. POC reviewed. Pt verbalized understanding.

## 2025-01-13 NOTE — SUBJECTIVE & OBJECTIVE
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 RNA NEGATIVE 5/2015, 2021, 2022    History of colon polyps     HPTH (hyperparathyroidism)     Hyperlipidemia     Hypertension associated with stage 3 chronic kidney disease due to type 2 diabetes mellitus     LBBB (left bundle branch block) 12/20/2021    Morbid obesity with BMI of 45.0-49.9, adult     Nephrolithiasis 6/7/2013    PCO (posterior capsular opacification), left 03/04/2019    Proteinuria     resolved s/p transplant    S/P kidney transplant     Sleep apnea     Type 2 diabetes, uncontrolled, with retinopathy     Type II diabetes mellitus with renal manifestations        Past Surgical History:   Procedure Laterality Date    CARDIAC CATHETERIZATION  01/01/2008    normal coronary    CARPAL TUNNEL RELEASE Right 12/01/2023    Procedure: RELEASE, CARPAL TUNNEL;  Surgeon: Noel Almonte MD;  Location: [facility] OR;  Service: Orthopedics;  Laterality: Right;    CARPAL TUNNEL RELEASE Left 7/18/2024    Procedure: RELEASE, CARPAL TUNNEL;  Surgeon: Noel Almonte MD;  Location: [facility] OR;  Service: Orthopedics;  Laterality: Left;    COLONOSCOPY N/A 04/05/2018    Procedure: COLONOSCOPY;  Surgeon: Chava Ronquillo MD;  Location: [facility] ENDO;   "Service: Endoscopy;  Laterality: N/A;    COLONOSCOPY N/A 05/02/2022    Procedure: COLONOSCOPY;  Surgeon: Alix Puente MD;  Location: [facility];  Service: Endoscopy;  Laterality: N/A;    COLONOSCOPY N/A 06/07/2023    Procedure: COLONOSCOPY - rule out CMV  Cardiac clearance/Eliquis hold approval received on 05/21/23 per Dr. Meade, cardiology.  Note in encounters.  LB;  Surgeon: Daniella Shah MD;  Location: [facility];  Service: Endoscopy;  Laterality: N/A;    ESOPHAGOGASTRODUODENOSCOPY N/A 03/26/2024    Procedure: EGD (ESOPHAGOGASTRODUODENOSCOPY) 3/1-pt cleared, ok to hold eliquis for 3 days;  Surgeon: Daniella Shah MD;  Location: [facility];  Service: Endoscopy;  Laterality: N/A;    KIDNEY TRANSPLANT  2015    RETINAL LASER PROCEDURE         Review of patient's allergies indicates:   Allergen Reactions    Lisinopril Other (See Comments)     Other reaction(s):  cough    Actos  [pioglitazone] Other (See Comments)     Other reaction(s): CHF    Metformin Other (See Comments)     Other reaction(s): renal insuff  Other reaction(s): CHF       No current facility-administered medications on file prior to encounter.     Current Outpatient Medications on File Prior to Encounter   Medication Sig    albuterol (PROVENTIL) 2.5 mg /3 mL (0.083 %) nebulizer solution Take 3 mLs (2.5 mg total) by nebulization every 4 to 6 hours as needed for Wheezing or Shortness of Breath.    albuterol (PROVENTIL/VENTOLIN HFA) 90 mcg/actuation inhaler Inhale 2 puffs into the lungs every 4 (four) hours as needed for Wheezing or Shortness of Breath.    amitriptyline (ELAVIL) 25 MG tablet Take 1 tablet (25 mg total) by mouth nightly as needed for Insomnia.    apixaban (ELIQUIS) 5 mg Tab Take 1 tablet (5 mg total) by mouth 2 (two) times daily.    aspirin (ECOTRIN) 81 MG EC tablet Take 1 tablet by mouth Daily.     BD ULTRA-FINE MINI PEN NEEDLE 31 gauge x 3/16" Ndle Use to inject insulin as needed up to 4 times daily    blood sugar " diagnostic (CONTOUR NEXT TEST STRIPS) Strp Test blood sugar 4 (four) times daily before meals and nightly.    blood-glucose meter (FREESTYLE LITE METER) kit 1 each 4 (four) times daily.    blood-glucose sensor (DEXCOM G7 SENSOR) Alba Use as directed for continuous glucose monitoring; Change every 10 days.    calcitRIOL (ROCALTROL) 0.25 MCG Cap Take 1 capsule (0.25 mcg total) by mouth once daily.    famotidine (PEPCID) 20 MG tablet TAKE ONE TABLET BY MOUTH EVERY EVENING    ferrous sulfate (FEOSOL) 325 mg (65 mg iron) Tab tablet Take 1 tablet (325 mg total) by mouth daily with breakfast.    fish oil-omega-3 fatty acids 300-1,000 mg capsule Take 1 g by mouth once daily.    furosemide (LASIX) 80 MG tablet Take one-half tablet (40 mg) by mouth 2 (two) times daily.    HYDROcodone-acetaminophen (NORCO) 5-325 mg per tablet Take 1 tablet by mouth every 6 (six) hours as needed for Pain.    insulin glargine U-100, Lantus, (LANTUS SOLOSTAR U-100 INSULIN) 100 unit/mL (3 mL) InPn pen Inject 30 Units into the skin 2 (two) times daily.    ketoconazole (NIZORAL) 200 mg Tab Take ½ tablet (100 mg total) by mouth once daily.    lancets (MICROLET LANCET) Misc 100 lancets by Misc.(Non-Drug; Combo Route) route 4 (four) times daily before meals and nightly.    magnesium oxide (MAG-OX) 400 mg (241.3 mg magnesium) tablet Take 1 tablet (400 mg total) by mouth once daily.    metoprolol succinate (TOPROL-XL) 25 MG 24 hr tablet Take 1 tablet (25 mg total) by mouth once daily.    multivitamin (THERAGRAN) tablet Take 1 tablet by mouth once daily.    mycophenolate (CELLCEPT) 250 mg Cap Take 2 capsules (500 mg total) by mouth 2 (two) times daily.    NOVOLOG FLEXPEN U-100 INSULIN 100 unit/mL (3 mL) InPn pen Inject 25 Units into the skin 3 (three) times daily with meals.    ondansetron (ZOFRAN) 4 MG tablet Take 1 tablet (4 mg total) by mouth every 6 (six) hours.    ondansetron (ZOFRAN-ODT) 4 MG TbDL Dissolve 1 tablet (4 mg total) by mouth every 8  (eight) hours as needed (Nausea/vomiting).    potassium chloride SA (K-DUR,KLOR-CON) 20 MEQ tablet Take 2 tablets (40 mEq total) by mouth once daily.    predniSONE (DELTASONE) 5 MG tablet Take 1 tablet (5 mg total) by mouth once daily.    rosuvastatin (CRESTOR) 40 MG Tab Take 1 tablet (40 mg total) by mouth once daily.    sacubitriL-valsartan (ENTRESTO)  mg per tablet Take 1 tablet by mouth 2 (two) times daily.    semaglutide (OZEMPIC) 2 mg/dose (8 mg/3 mL) PnIj Inject 2 mg into the skin every 7 days.    tacrolimus (PROGRAF) 1 MG Cap Take 5 capsules (5 mg total) by mouth every 12 (twelve) hours.    triamcinolone acetonide 0.1% (KENALOG) 0.1 % ointment Apply topically 2 (two) times daily. Use on bilateral lower legs.     Family History       Problem Relation (Age of Onset)    Diabetes Mother, Sister, Maternal Grandmother    Heart failure Mother, Father    Hypertension Mother    Kidney disease Sister          Tobacco Use    Smoking status: Former     Current packs/day: 0.00     Types: Cigarettes     Quit date: 2013     Years since quittin.6     Passive exposure: Past    Smokeless tobacco: Former     Quit date: 2013    Tobacco comments:     used marijuana since [id number], stopped after started dialysis   Substance and Sexual Activity    Alcohol use: No     Alcohol/week: 0.0 standard drinks of alcohol    Drug use: Not Currently     Comment:      Sexual activity: Never     Review of Systems   Constitutional:  Positive for activity change, appetite change and fatigue. Negative for chills and fever.   HENT: Negative.     Eyes: Negative.    Respiratory:  Positive for shortness of breath.    Cardiovascular: Negative.    Gastrointestinal:  Positive for blood in stool.   Endocrine: Negative.    Genitourinary:  Positive for decreased urine volume and hematuria.   Musculoskeletal:  Positive for gait problem.   Skin: Negative.    Allergic/Immunologic: Negative.    Neurological:  Positive for weakness.    Hematological: Negative.    Psychiatric/Behavioral:  Positive for decreased concentration. The patient is nervous/anxious.      Objective:     Vital Signs (Most Recent):  Temp: 97.8 °F (36.6 °C) (01/12/25 1713)  Pulse: (!) 113 (01/12/25 1713)  Resp: 18 (01/12/25 1713)  BP: 138/65 (01/12/25 1713)  SpO2: 96 % (01/12/25 1713) Vital Signs (24h Range):  Temp:  [97.6 °F (36.4 °C)-97.8 °F (36.6 °C)] 97.8 °F (36.6 °C)  Pulse:  [108-125] 113  Resp:  [18-24] 18  SpO2:  [96 %-100 %] 96 %  BP: ()/(46-67) 138/65     Weight: 126 kg (277 lb 12.5 oz)  Body mass index is 43.51 kg/m².     Physical Exam  Vitals and nursing note reviewed.   Constitutional:       General: He is not in acute distress.     Appearance: He is obese. He is ill-appearing. He is not toxic-appearing.      Comments: Frail elderly man, appears older than his age, AAOx3, speech slow   Neurological:      Mental Status: He is alert.                Results for orders placed or performed during the hospital encounter of 01/12/25   CBC W/ AUTO DIFFERENTIAL    Collection Time: 01/12/25  6:39 AM   Result Value Ref Range    WBC 3.67 (L) 3.90 - 12.70 K/uL    RBC 3.83 (L) 4.60 - 6.20 M/uL    Hemoglobin 9.5 (L) 14.0 - 18.0 g/dL    Hematocrit 32.6 (L) 40.0 - 54.0 %    MCV 85 82 - 98 fL    MCH 24.8 (L) 27.0 - 31.0 pg    MCHC 29.1 (L) 32.0 - 36.0 g/dL    RDW 13.8 11.5 - 14.5 %    Platelets 245 150 - 450 K/uL    MPV 10.2 9.2 - 12.9 fL    Immature Granulocytes CANCELED 0.0 - 0.5 %    Immature Grans (Abs) CANCELED 0.00 - 0.04 K/uL    Lymph # CANCELED 1.0 - 4.8 K/uL    Mono # CANCELED 0.3 - 1.0 K/uL    Eos # CANCELED 0.0 - 0.5 K/uL    Baso # CANCELED 0.00 - 0.20 K/uL    nRBC 0 0 /100 WBC    Gran % 47.0 38.0 - 73.0 %    Lymph % 27.0 18.0 - 48.0 %    Mono % 26.0 (H) 4.0 - 15.0 %    Eosinophil % 0.0 0.0 - 8.0 %    Basophil % 0.0 0.0 - 1.9 %    Platelet Estimate Clumped (A)     Poik Slight     Tear Drop Cells Occasional     Schistocytes Present     Large/Giant Platelets Present      Differential Method Manual    Comp. Metabolic Panel    Collection Time: 01/12/25  6:39 AM   Result Value Ref Range    Sodium 141 136 - 145 mmol/L    Potassium 6.7 (HH) 3.5 - 5.1 mmol/L    Chloride 105 95 - 110 mmol/L    CO2 23 23 - 29 mmol/L    Glucose 146 (H) 70 - 110 mg/dL    BUN 82 (H) 8 - 23 mg/dL    Creatinine 4.3 (H) 0.5 - 1.4 mg/dL    Calcium 9.8 8.7 - 10.5 mg/dL    Total Protein 6.2 6.0 - 8.4 g/dL    Albumin 2.3 (L) 3.5 - 5.2 g/dL    Total Bilirubin 0.5 0.1 - 1.0 mg/dL    Alkaline Phosphatase 96 40 - 150 U/L    AST 17 10 - 40 U/L    ALT 11 10 - 44 U/L    eGFR 14 (A) >60 mL/min/1.73 m^2    Anion Gap 13 8 - 16 mmol/L   Lipase    Collection Time: 01/12/25  6:39 AM   Result Value Ref Range    Lipase 4 4 - 60 U/L   Lactic acid, plasma    Collection Time: 01/12/25  6:39 AM   Result Value Ref Range    Lactate (Lactic Acid) 1.1 0.5 - 2.2 mmol/L   Procalcitonin    Collection Time: 01/12/25  6:39 AM   Result Value Ref Range    Procalcitonin 2.01 (H) <0.25 ng/mL   Magnesium    Collection Time: 01/12/25  6:39 AM   Result Value Ref Range    Magnesium 2.9 (H) 1.6 - 2.6 mg/dL   CPK    Collection Time: 01/12/25  6:39 AM   Result Value Ref Range    CPK 55 20 - 200 U/L   Brain natriuretic peptide    Collection Time: 01/12/25  6:39 AM   Result Value Ref Range     (H) 0 - 99 pg/mL   EKG 12-lead    Collection Time: 01/12/25  7:01 AM   Result Value Ref Range    QRS Duration 148 ms    OHS QTC Calculation 544 ms   Urinalysis, Reflex to Urine Culture Urine, Catheterized    Collection Time: 01/12/25  7:09 AM    Specimen: Urine   Result Value Ref Range    Specimen UA Urine, Catheterized     Color, UA Yellow Yellow, Straw, Nikki    Appearance, UA Hazy (A) Clear    pH, UA 6.0 5.0 - 8.0    Specific Gravity, UA 1.015 1.005 - 1.030    Protein, UA 1+ (A) Negative    Glucose, UA Negative Negative    Ketones, UA Negative Negative    Bilirubin (UA) Negative Negative    Occult Blood UA Negative Negative    Nitrite, UA Negative  Negative    Urobilinogen, UA Negative <2.0 EU/dL    Leukocytes, UA Negative Negative   Urinalysis Microscopic    Collection Time: 01/12/25  7:09 AM   Result Value Ref Range    RBC, UA 3 0 - 4 /hpf    WBC, UA 11 (H) 0 - 5 /hpf    Bacteria Rare None-Occ /hpf    Squam Epithel, UA 2 /hpf    Hyaline Casts, UA 0 0-1/lpf /lpf    Microscopic Comment SEE COMMENT    POCT glucose    Collection Time: 01/12/25  7:39 AM   Result Value Ref Range    POCT Glucose 159 (H) 70 - 110 mg/dL   Type & Screen    Collection Time: 01/12/25  7:53 AM   Result Value Ref Range    Group & Rh B POS     Indirect Jimmy NEG     Specimen Outdate 01/15/2025 23:59    Occult blood x 1, stool    Collection Time: 01/12/25  8:00 AM    Specimen: Stool   Result Value Ref Range    Occult Blood Positive (A) Negative   POCT glucose    Collection Time: 01/12/25  8:31 AM   Result Value Ref Range    POCT Glucose 265 (H) 70 - 110 mg/dL   Basic metabolic panel    Collection Time: 01/12/25  8:44 AM   Result Value Ref Range    Sodium 139 136 - 145 mmol/L    Potassium 5.4 (H) 3.5 - 5.1 mmol/L    Chloride 105 95 - 110 mmol/L    CO2 21 (L) 23 - 29 mmol/L    Glucose 246 (H) 70 - 110 mg/dL    BUN 80 (H) 8 - 23 mg/dL    Creatinine 4.3 (H) 0.5 - 1.4 mg/dL    Calcium 9.6 8.7 - 10.5 mg/dL    Anion Gap 13 8 - 16 mmol/L    eGFR 14 (A) >60 mL/min/1.73 m^2   Clostridium difficile EIA    Collection Time: 01/12/25 10:21 AM    Specimen: Stool   Result Value Ref Range    C. diff Antigen Negative Negative    C difficile Toxins A+B, EIA Negative Negative   Rotavirus antigen, stool    Collection Time: 01/12/25 10:21 AM   Result Value Ref Range    Rotavirus Negative Negative   CBC auto differential    Collection Time: 01/12/25 10:56 AM   Result Value Ref Range    WBC 3.14 (L) 3.90 - 12.70 K/uL    RBC 3.42 (L) 4.60 - 6.20 M/uL    Hemoglobin 8.6 (L) 14.0 - 18.0 g/dL    Hematocrit 29.1 (L) 40.0 - 54.0 %    MCV 85 82 - 98 fL    MCH 25.1 (L) 27.0 - 31.0 pg    MCHC 29.6 (L) 32.0 - 36.0 g/dL     RDW 13.9 11.5 - 14.5 %    Platelets 223 150 - 450 K/uL    MPV 10.1 9.2 - 12.9 fL    Immature Granulocytes CANCELED 0.0 - 0.5 %    Immature Grans (Abs) CANCELED 0.00 - 0.04 K/uL    nRBC 0 0 /100 WBC    Gran % 42.0 38.0 - 73.0 %    Lymph % 24.0 18.0 - 48.0 %    Mono % 33.0 (H) 4.0 - 15.0 %    Eosinophil % 1.0 0.0 - 8.0 %    Basophil % 0.0 0.0 - 1.9 %    Platelet Estimate Clumped (A)     Poik Slight     Ovalocytes Occasional     Tear Drop Cells Occasional     Schistocytes Present     Large/Giant Platelets Present     Differential Method Manual    POCT glucose    Collection Time: 01/12/25  5:15 PM   Result Value Ref Range    POCT Glucose 362 (H) 70 - 110 mg/dL     *Note: Due to a large number of results and/or encounters for the requested time period, some results have not been displayed. A complete set of results can be found in Results Review.      Significant Labs: All pertinent labs within the past 24 hours have been reviewed.    Imaging Results              US Transplant Kidney With Doppler (Final result)  Result time 01/12/25 14:00:36   Procedure changed from US Retroperitoneal Complete     Final result by Joe Leach MD (01/12/25 14:00:36)                   Impression:      1.  Interval increase in main transplant renal artery velocities, currently measuring 170 centimeters/second (previously measuring 146 centimeters/second).  There is also interval increase in range of resistive indices in the segmental arteries, ranging between 0.82 and 0.88 (previously measuring 0.73-0.76).  This can be seen with infectious or inflammatory process, or early rejection changes.  Negative for hydronephrosis.  Negative for renal lesions.      Electronically signed by: Joe Leach MD  Date:    01/12/2025  Time:    14:00               Narrative:    EXAMINATION:  US TRANSPLANT KIDNEY WITH DOPPLER    CLINICAL HISTORY:  ANGELITO in a transplant;    TECHNIQUE:  Transplant renal ultrasound of the right lower quadrant with color flow  doppler and duplex analysis.    COMPARISON:  December 6, 2021    FINDINGS:  A transplanted kidney measures 9.5 centimeters in length.  Normal perfusion.  There is no hydronephrosis.No fluid collections.    Resistive indices ranged from 0.82 to 0.88.  Velocity in main renal artery is 170 cm/sec and the renal artery/iliac ratio is 0.9.  The main renal vein is patent.    The urinary bladder is contracted and thick walled.                                       X-Ray Chest AP Portable (Final result)  Result time 01/12/25 07:00:01      Final result by Joe Leach MD (01/12/25 07:00:01)                   Impression:      1.  Negative for acute process involving the chest.    2.  Stable findings as noted above.      Electronically signed by: Joe Leach MD  Date:    01/12/2025  Time:    07:00               Narrative:    EXAMINATION:  XR CHEST AP PORTABLE    CLINICAL HISTORY:  Weakness    COMPARISON:  August 22, 2023    FINDINGS:  The study is lordotic in position.  Chronically low lung volumes with elevation of the right hemidiaphragm.  The lungs are free of new pulmonary opacity.  The cardiac silhouette size is borderline enlarged.  The trachea is midline and the mediastinal width is normal. Negative for focal infiltrate, effusion or pneumothorax. Pulmonary vasculature is normal. Negative for osseous abnormalities. Convex right curvature of the thoracic spine with marginal spondylosis.  Tortuous aorta with calcifications of the aortic knob.  Stable vascular stent in the right axilla.                                       X-Ray Abdomen Flat And Erect (Final result)  Result time 01/12/25 06:56:43      Final result by Joe Leach MD (01/12/25 06:56:43)                   Impression:      1.  Negative for acute process.      Electronically signed by: Joe Leach MD  Date:    01/12/2025  Time:    06:56               Narrative:    EXAMINATION:  XR ABDOMEN FLAT AND ERECT    CLINICAL HISTORY:  Vomiting,  unspecified    TECHNIQUE:  Flat and erect AP views of the abdomen were performed.    COMPARISON:  Abdomen and pelvis CT scan from April 14, 2024    FINDINGS:  Normal bowel gas pattern.  Negative for free air.    Stable degenerative changes of the spine and pelvis.  Lung bases are clear.                                       Significant Imaging: I have reviewed all pertinent imaging results/findings within the past 24 hours.

## 2025-01-13 NOTE — ASSESSMENT & PLAN NOTE
Creatine stable for now. BMP reviewed- noted Estimated Creatinine Clearance: 19.2 mL/min (A) (based on SCr of 4.5 mg/dL (H)). according to latest data. Based on current GFR, CKD stage is stage 4 - GFR 15-29.  Monitor UOP and serial BMP and adjust therapy as needed. Renally dose meds. Avoid nephrotoxic medications and procedures.  Pt is d/p Renal Transplant in 2015, on Prograf and Cellcept

## 2025-01-13 NOTE — HPI
Mitch Whittaker is a 69 y.o. male patient with a PMHx of CHF- EF 40%, anemia, hyperparathyroidism, type 2 DM, s/p kidney transplant 2015 on Prograf and Cellcept, DVT on Eliquis, MARION, HLD, HTN, CKD, Hep C, and arthritis who presents to the Emergency Department for evaluation of nausea vomiting and diarrhea over the past several days (both of which have improved), but felt weak and couldn't move around like normal. no abd pain, no systemic symptoms, stool now formed. Pt is a very poor historian. According to nursing staff, family reported that the pt has been sitting in his chair for the last 3 days. Pt reports that he has had two episodes of diarrhea since yesterday, but due to the increasing weakness in his knees he was unable to get up from his chair. Pt normally ambulates with assistance from a walker. Symptoms are constant and moderate in severity. Associated sxs include blood in stool (x4 days) and n/v yesterday, but none today after PO. Patient denies any fever, abdominal pain, appetite changes, SOB, dysuria, and all other sxs at this time. No prior tx. Pt is on Eliquis. No further complaints or concerns at this time.   In the ER, VS /65, , Sats 100% on RA, Afeb, WBC 3.7, H/H 9.5/33, Bun/Cr 82/4.3, K 6.7- treated aggressively in the ER and rechecked and repeat 5.4. He is heme positive as well. C Diff Neg. She is being admitted for possible Hyperkalemia, acute GI Bleed, ANGELITO.

## 2025-01-13 NOTE — SUBJECTIVE & OBJECTIVE
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 RNA NEGATIVE 5/2015, 2021, 2022    History of colon polyps     HPTH (hyperparathyroidism)     Hyperlipidemia     Hypertension associated with stage 3 chronic kidney disease due to type 2 diabetes mellitus     LBBB (left bundle branch block) 12/20/2021    Morbid obesity with BMI of 45.0-49.9, adult     Nephrolithiasis 6/7/2013    PCO (posterior capsular opacification), left 03/04/2019    Proteinuria     resolved s/p transplant    S/P kidney transplant     Sleep apnea     Type 2 diabetes, uncontrolled, with retinopathy     Type II diabetes mellitus with renal manifestations        Past Surgical History:   Procedure Laterality Date    CARDIAC CATHETERIZATION  01/01/2008    normal coronary    CARPAL TUNNEL RELEASE Right 12/01/2023    Procedure: RELEASE, CARPAL TUNNEL;  Surgeon: Noel Almonte MD;  Location: [facility] OR;  Service: Orthopedics;  Laterality: Right;    CARPAL TUNNEL RELEASE Left 7/18/2024    Procedure: RELEASE, CARPAL TUNNEL;  Surgeon: Noel Almonte MD;  Location: [facility] OR;  Service: Orthopedics;  Laterality: Left;    COLONOSCOPY N/A 04/05/2018    Procedure: COLONOSCOPY;  Surgeon: Chava Ronquillo MD;  Location: [facility] ENDO;   Service: Endoscopy;  Laterality: N/A;    COLONOSCOPY N/A 2022    Procedure: COLONOSCOPY;  Surgeon: Alix Puente MD;  Location: [facility];  Service: Endoscopy;  Laterality: N/A;    COLONOSCOPY N/A 2023    Procedure: COLONOSCOPY - rule out CMV  Cardiac clearance/Eliquis hold approval received on 23 per Dr. Meade, cardiology.  Note in encounters.  LB;  Surgeon: Daniella Shah MD;  Location: [facility];  Service: Endoscopy;  Laterality: N/A;    ESOPHAGOGASTRODUODENOSCOPY N/A 2024    Procedure: EGD (ESOPHAGOGASTRODUODENOSCOPY) 3/1-pt cleared, ok to hold eliquis for 3 days;  Surgeon: Daniella Shah MD;  Location: [facility];  Service: Endoscopy;  Laterality: N/A;    KIDNEY TRANSPLANT      RETINAL LASER PROCEDURE         Review of patient's allergies indicates:   Allergen Reactions    Lisinopril Other (See Comments)     Other reaction(s):  cough    Actos  [pioglitazone] Other (See Comments)     Other reaction(s): CHF    Metformin Other (See Comments)     Other reaction(s): renal insuff  Other reaction(s): CHF     Family History       Problem Relation (Age of Onset)    Diabetes Mother, Sister, Maternal Grandmother    Heart failure Mother, Father    Hypertension Mother    Kidney disease Sister          Tobacco Use    Smoking status: Former     Current packs/day: 0.00     Types: Cigarettes     Quit date: 2013     Years since quittin.6     Passive exposure: Past    Smokeless tobacco: Former     Quit date: 2013    Tobacco comments:     used marijuana since 5718-3130, stopped after started dialysis   Substance and Sexual Activity    Alcohol use: No     Alcohol/week: 0.0 standard drinks of alcohol    Drug use: Not Currently     Comment:      Sexual activity: Never     Review of Systems   Constitutional:  Negative for fever.   HENT:  Negative for hearing loss.    Eyes:  Negative for visual disturbance.   Respiratory:  Negative for cough and shortness of breath.     Cardiovascular:  Positive for chest pain (mild nagging pain in midline) and leg swelling. Negative for palpitations.   Gastrointestinal:         As per HPI.   Genitourinary:  Negative for difficulty urinating, dysuria, frequency and hematuria.   Musculoskeletal:  Negative for arthralgias and back pain.   Skin:  Negative for color change.   Neurological:  Positive for weakness. Negative for seizures, syncope, numbness and headaches.   Hematological:  Does not bruise/bleed easily.   Psychiatric/Behavioral:  The patient is not nervous/anxious.      Objective:     Vital Signs (Most Recent):  Temp: 100.1 °F (37.8 °C) (01/13/25 0728)  Pulse: (!) 128 (01/13/25 0728)  Resp: 17 (01/13/25 0728)  BP: (!) 135/55 (01/13/25 0728)  SpO2: (!) 91 % (01/13/25 0728) Vital Signs (24h Range):  Temp:  [97.8 °F (36.6 °C)-100.1 °F (37.8 °C)] 100.1 °F (37.8 °C)  Pulse:  [111-128] 128  Resp:  [16-24] 17  SpO2:  [91 %-100 %] 91 %  BP: ()/(46-69) 135/55     Weight: 126 kg (277 lb 12.5 oz) (01/12/25 1713)  Body mass index is 43.51 kg/m².      Intake/Output Summary (Last 24 hours) at 1/13/2025 0839  Last data filed at 1/12/2025 2100  Gross per 24 hour   Intake 240 ml   Output --   Net 240 ml       Lines/Drains/Airways       Peripheral Intravenous Line  Duration                  Hemodialysis AV Fistula Right upper arm -- days         Peripheral IV - Single Lumen 01/12/25 0639 18 G Left Wrist 1 day                     Physical Exam  Constitutional:       General: He is not in acute distress.     Appearance: Normal appearance. He is well-developed.   HENT:      Head: Normocephalic and atraumatic.   Eyes:      Extraocular Movements: Extraocular movements intact.   Cardiovascular:      Rate and Rhythm: Regular rhythm. Tachycardia present.      Heart sounds: Normal heart sounds. No murmur heard.  Pulmonary:      Effort: Pulmonary effort is normal. No respiratory distress.      Breath sounds: Normal breath sounds. No wheezing.   Abdominal:       "General: Bowel sounds are normal. There is no distension.      Palpations: Abdomen is soft. There is no mass.      Tenderness: There is no abdominal tenderness.   Musculoskeletal:      Cervical back: Normal range of motion and neck supple.      Right lower leg: Edema present.      Left lower leg: Edema present.   Skin:     General: Skin is warm and dry.   Neurological:      Mental Status: He is alert and oriented to person, place, and time.      Cranial Nerves: No cranial nerve deficit.   Psychiatric:         Behavior: Behavior normal.          Significant Labs:  CBC:   Recent Labs   Lab 01/12/25  1056 01/13/25  0056 01/13/25  0448   WBC 3.14* 2.84* 2.96*   HGB 8.6* 8.7* 8.7*   HCT 29.1* 29.3* 29.0*    228 220     CMP:   Recent Labs   Lab 01/12/25  0639 01/12/25  0844 01/13/25  0448   *   < > 267*  261*   CALCIUM 9.8   < > 8.6*  9.6   ALBUMIN 2.3*  --  1.9*   PROT 6.2  --   --       < > 142  141   K 6.7*   < > 6.1*  6.1*   CO2 23   < > 25  21*      < > 108  107   BUN 82*   < > 84*  81*   CREATININE 4.3*   < > 4.3*  4.5*   ALKPHOS 96  --   --    ALT 11  --   --    AST 17  --   --    BILITOT 0.5  --   --     < > = values in this interval not displayed.     Coagulation: No results for input(s): "PT", "INR", "APTT" in the last 48 hours.    Significant Imaging:  Imaging results within the past 24 hours have been reviewed.  "

## 2025-01-14 LAB
ANION GAP SERPL CALC-SCNC: 14 MMOL/L (ref 8–16)
ANISOCYTOSIS BLD QL SMEAR: SLIGHT
BACTERIA STL CULT: NORMAL
BASOPHILS NFR BLD: 0 % (ref 0–1.9)
BUN SERPL-MCNC: 81 MG/DL (ref 8–23)
CALCIUM SERPL-MCNC: 8.9 MG/DL (ref 8.7–10.5)
CHLORIDE SERPL-SCNC: 107 MMOL/L (ref 95–110)
CO2 SERPL-SCNC: 19 MMOL/L (ref 23–29)
CREAT SERPL-MCNC: 4.3 MG/DL (ref 0.5–1.4)
DACRYOCYTES BLD QL SMEAR: ABNORMAL
DIFFERENTIAL METHOD BLD: ABNORMAL
EOSINOPHIL NFR BLD: 0 % (ref 0–8)
ERYTHROCYTE [DISTWIDTH] IN BLOOD BY AUTOMATED COUNT: 13.9 % (ref 11.5–14.5)
EST. GFR  (NO RACE VARIABLE): 14 ML/MIN/1.73 M^2
GLUCOSE SERPL-MCNC: 282 MG/DL (ref 70–110)
HCT VFR BLD AUTO: 28.3 % (ref 40–54)
HGB BLD-MCNC: 8.3 G/DL (ref 14–18)
IMM GRANULOCYTES # BLD AUTO: ABNORMAL K/UL (ref 0–0.04)
IMM GRANULOCYTES NFR BLD AUTO: ABNORMAL % (ref 0–0.5)
LYMPHOCYTES NFR BLD: 11 % (ref 18–48)
MCH RBC QN AUTO: 25.1 PG (ref 27–31)
MCHC RBC AUTO-ENTMCNC: 29.3 G/DL (ref 32–36)
MCV RBC AUTO: 86 FL (ref 82–98)
MONOCYTES NFR BLD: 22 % (ref 4–15)
NEUTROPHILS NFR BLD: 65 % (ref 38–73)
NEUTS BAND NFR BLD MANUAL: 2 %
NRBC BLD-RTO: 0 /100 WBC
OHS QRS DURATION: 136 MS
OHS QTC CALCULATION: 493 MS
OVALOCYTES BLD QL SMEAR: ABNORMAL
PLATELET # BLD AUTO: 231 K/UL (ref 150–450)
PLATELET BLD QL SMEAR: ABNORMAL
PMV BLD AUTO: 10.5 FL (ref 9.2–12.9)
POCT GLUCOSE: 254 MG/DL (ref 70–110)
POCT GLUCOSE: 279 MG/DL (ref 70–110)
POCT GLUCOSE: 286 MG/DL (ref 70–110)
POCT GLUCOSE: 297 MG/DL (ref 70–110)
POCT GLUCOSE: 307 MG/DL (ref 70–110)
POIKILOCYTOSIS BLD QL SMEAR: SLIGHT
POTASSIUM SERPL-SCNC: 6 MMOL/L (ref 3.5–5.1)
RBC # BLD AUTO: 3.31 M/UL (ref 4.6–6.2)
SODIUM SERPL-SCNC: 140 MMOL/L (ref 136–145)
WBC # BLD AUTO: 3.01 K/UL (ref 3.9–12.7)

## 2025-01-14 PROCEDURE — 93005 ELECTROCARDIOGRAM TRACING: CPT

## 2025-01-14 PROCEDURE — 99233 SBSQ HOSP IP/OBS HIGH 50: CPT | Mod: ,,, | Performed by: PHYSICIAN ASSISTANT

## 2025-01-14 PROCEDURE — 25000003 PHARM REV CODE 250: Performed by: INTERNAL MEDICINE

## 2025-01-14 PROCEDURE — 25000003 PHARM REV CODE 250: Performed by: PHYSICIAN ASSISTANT

## 2025-01-14 PROCEDURE — 93010 ELECTROCARDIOGRAM REPORT: CPT | Mod: ,,, | Performed by: INTERNAL MEDICINE

## 2025-01-14 PROCEDURE — A4216 STERILE WATER/SALINE, 10 ML: HCPCS | Performed by: EMERGENCY MEDICINE

## 2025-01-14 PROCEDURE — 21400001 HC TELEMETRY ROOM

## 2025-01-14 PROCEDURE — 99232 SBSQ HOSP IP/OBS MODERATE 35: CPT | Mod: ,,, | Performed by: INTERNAL MEDICINE

## 2025-01-14 PROCEDURE — 85027 COMPLETE CBC AUTOMATED: CPT | Performed by: EMERGENCY MEDICINE

## 2025-01-14 PROCEDURE — 25000003 PHARM REV CODE 250: Performed by: EMERGENCY MEDICINE

## 2025-01-14 PROCEDURE — 85007 BL SMEAR W/DIFF WBC COUNT: CPT | Performed by: EMERGENCY MEDICINE

## 2025-01-14 PROCEDURE — 36415 COLL VENOUS BLD VENIPUNCTURE: CPT | Performed by: EMERGENCY MEDICINE

## 2025-01-14 PROCEDURE — 80048 BASIC METABOLIC PNL TOTAL CA: CPT | Performed by: EMERGENCY MEDICINE

## 2025-01-14 PROCEDURE — 63600175 PHARM REV CODE 636 W HCPCS: Performed by: EMERGENCY MEDICINE

## 2025-01-14 RX ORDER — INSULIN ASPART 100 [IU]/ML
0-10 INJECTION, SOLUTION INTRAVENOUS; SUBCUTANEOUS
Status: DISCONTINUED | OUTPATIENT
Start: 2025-01-14 | End: 2025-01-29 | Stop reason: HOSPADM

## 2025-01-14 RX ORDER — INSULIN GLARGINE 100 [IU]/ML
10 INJECTION, SOLUTION SUBCUTANEOUS 2 TIMES DAILY
Status: DISCONTINUED | OUTPATIENT
Start: 2025-01-14 | End: 2025-01-25

## 2025-01-14 RX ADMIN — Medication 10 ML: at 02:01

## 2025-01-14 RX ADMIN — INSULIN ASPART 4 UNITS: 100 INJECTION, SOLUTION INTRAVENOUS; SUBCUTANEOUS at 05:01

## 2025-01-14 RX ADMIN — MYCOPHENOLATE MOFETIL 500 MG: 500 TABLET, FILM COATED ORAL at 08:01

## 2025-01-14 RX ADMIN — INSULIN ASPART 6 UNITS: 100 INJECTION, SOLUTION INTRAVENOUS; SUBCUTANEOUS at 04:01

## 2025-01-14 RX ADMIN — PANTOPRAZOLE SODIUM 40 MG: 40 TABLET, DELAYED RELEASE ORAL at 08:01

## 2025-01-14 RX ADMIN — SODIUM CHLORIDE: 9 INJECTION, SOLUTION INTRAVENOUS at 04:01

## 2025-01-14 RX ADMIN — Medication 10 ML: at 09:01

## 2025-01-14 RX ADMIN — Medication 10 ML: at 05:01

## 2025-01-14 RX ADMIN — MYCOPHENOLATE MOFETIL 500 MG: 500 TABLET, FILM COATED ORAL at 09:01

## 2025-01-14 RX ADMIN — PANTOPRAZOLE SODIUM 40 MG: 40 TABLET, DELAYED RELEASE ORAL at 09:01

## 2025-01-14 RX ADMIN — ACETAMINOPHEN 650 MG: 325 TABLET ORAL at 05:01

## 2025-01-14 RX ADMIN — SODIUM ZIRCONIUM CYCLOSILICATE 10 G: 5 POWDER, FOR SUSPENSION ORAL at 08:01

## 2025-01-14 RX ADMIN — INSULIN ASPART 3 UNITS: 100 INJECTION, SOLUTION INTRAVENOUS; SUBCUTANEOUS at 09:01

## 2025-01-14 RX ADMIN — INSULIN GLARGINE 10 UNITS: 100 INJECTION, SOLUTION SUBCUTANEOUS at 09:01

## 2025-01-14 RX ADMIN — ACETAMINOPHEN 650 MG: 325 TABLET ORAL at 03:01

## 2025-01-14 RX ADMIN — INSULIN ASPART 3 UNITS: 100 INJECTION, SOLUTION INTRAVENOUS; SUBCUTANEOUS at 11:01

## 2025-01-14 NOTE — NURSING
Pt sustaining pulse rate in 130s. MD notified. Pt complains of no chest pain or tightness, dizziness, or lightheadedness. Reports no shortness of breath, tachypneic.

## 2025-01-14 NOTE — CONSULTS
Consult    Consulting Physician:  Jaymie Medley MD  Reason for Consultation: toe wound    CC: Fatigue (Family report pt has been sitting in chair for 3 days, incontinent of bowel and urine , kidney transplant 2015, former dialysis, shunt located right arm)      HPI: Mitch Whittaker is a 69 y.o. male with PMHx as seen below, was admitted on 1/12/2025 due to increased nausea, vomitting and diarrhea. Daughter at bedside gives a majority of hx. Patient in bed but resting on exam. Patient with hx of kidney transplant in 2015 and on prograf and cellcept. He started having increased N/V/D this past week and presented to the ED and discharged home. His abdominal complaints worsened and he presented back to the ED on 1/12. He began having worsening diarrhea and weakness, as well as bloody bowel movements. During admission, he was found to have increased leg swelling and venous stasis with mild skin abrasions. He was found to have left SFA stenosis on arterial duplex. He is unable to tolerate an MARÍA ELENA due to pain. Vascular consutled for evaluation and reccomendations if okay to pursue Unna boot therapy or other work up.     On exam, patient resting in bed. Daughter at bedside who gives majority of hx. Reports patient is a mildly active individual. He does live at home independently and ambulated without assistance prior to hospitalization. Discussed cares with patient and daughter. No need for vascular interventions. Patient with palpable pulses to bilateral DPs on exam. Will continue with wound care and unna boot therapy.       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 RNA NEGATIVE 5/2015, 2021, 2022    History of colon polyps     HPTH (hyperparathyroidism)     Hyperlipidemia     Hypertension associated with stage 3 chronic kidney disease due to type 2 diabetes mellitus     LBBB (left bundle branch block) 12/20/2021    Morbid obesity with BMI of 45.0-49.9, adult     Nephrolithiasis 6/7/2013    PCO (posterior capsular opacification), left 03/04/2019    Proteinuria     resolved s/p transplant    S/P kidney transplant     Sleep apnea     Type 2 diabetes, uncontrolled, with retinopathy     Type II diabetes mellitus with renal manifestations        Past Surgical History:   Procedure Laterality Date    CARDIAC CATHETERIZATION  01/01/2008    normal coronary    CARPAL TUNNEL RELEASE Right 12/01/2023    Procedure: RELEASE, CARPAL TUNNEL;  Surgeon: Noel Almonte MD;  Location: [facility] OR;  Service: Orthopedics;  Laterality: Right;    CARPAL TUNNEL RELEASE Left 7/18/2024    Procedure: RELEASE, CARPAL TUNNEL;  Surgeon: Noel Almonte MD;  Location: [facility] OR;  Service: Orthopedics;  Laterality: Left;    COLONOSCOPY N/A 04/05/2018    Procedure: COLONOSCOPY;  Surgeon: Chava Ronquillo MD;  Location: [facility] ENDO;  Service: Endoscopy;  Laterality: N/A;    COLONOSCOPY N/A 05/02/2022    Procedure: COLONOSCOPY;  Surgeon: Alix Puente MD;  Location: [facility] ENDO;  Service: Endoscopy;  Laterality: N/A;    COLONOSCOPY N/A 06/07/2023    Procedure: COLONOSCOPY - rule out CMV  Cardiac clearance/Eliquis hold approval received on 05/21/23 per Dr. Meade, cardiology.  Note in encounters.  LB;  Surgeon: Daniella JIMÉNEZ  MD Pablo;  Location: [facility] ENDO;  Service: Endoscopy;  Laterality: N/A;    ESOPHAGOGASTRODUODENOSCOPY N/A 2024    Procedure: EGD (ESOPHAGOGASTRODUODENOSCOPY) 3/1-pt cleared, ok to hold eliquis for 3 days;  Surgeon: Daniella Shah MD;  Location: [facility] ENDO;  Service: Endoscopy;  Laterality: N/A;    KIDNEY TRANSPLANT  2015    RETINAL LASER PROCEDURE         Social History     Socioeconomic History    Marital status: Legally     Number of children: 2   Occupational History    Occupation: retired     Employer: Retired   Tobacco Use    Smoking status: Former     Current packs/day: 0.00     Types: Cigarettes     Quit date: 2013     Years since quittin.6     Passive exposure: Past    Smokeless tobacco: Former     Quit date: 2013    Tobacco comments:     used marijuana since 6045-4607, stopped after started dialysis   Substance and Sexual Activity    Alcohol use: No     Alcohol/week: 0.0 standard drinks of alcohol    Drug use: Not Currently     Comment:      Sexual activity: Never   Social History Narrative    . Lives with spouse. Has 2 children. Patient retired as  for [employer]. He has been washing cars.     Social Drivers of Health     Financial Resource Strain: Patient Declined (2025)    Overall Financial Resource Strain (CARDIA)     Difficulty of Paying Living Expenses: Patient declined   Food Insecurity: Patient Declined (2025)    Hunger Vital Sign     Worried About Running Out of Food in the Last Year: Patient declined     Ran Out of Food in the Last Year: Patient declined   Transportation Needs: Patient Unable To Answer (2025)    TRANSPORTATION NEEDS     Transportation : Patient unable to answer   Stress: Patient Declined (2025)    [employer] - Occupational Stress Questionnaire     Feeling of Stress : Patient declined   Housing Stability: Patient Declined (2025)    Housing Stability Vital Sign      Unable to Pay for Housing in the Last Year: Patient declined     Homeless in the Last Year: Patient declined       Family History   Problem Relation Name Age of Onset    Diabetes Mother      Hypertension Mother      Heart failure Mother      Heart failure Father      Kidney disease Sister          ESRD    Diabetes Sister      Diabetes Maternal Grandmother      Cancer Neg Hx           Current Facility-Administered Medications:     0.9% NaCl infusion, , Intravenous, Continuous, Yoandy Bennett MD, Last Rate: 75 mL/hr at 01/14/25 0431, New Bag at 01/14/25 0431    acetaminophen tablet 650 mg, 650 mg, Oral, Q4H PRN, Jaymie Medley MD, 650 mg at 01/14/25 0530    dextrose 50% injection 12.5 g, 12.5 g, Intravenous, PRNGalindo Majid A., MD    dextrose 50% injection 25 g, 25 g, Intravenous, PRNGalindo Majid A., MD    glucagon (human recombinant) injection 1 mg, 1 mg, Intramuscular, PRN, Jaymie Medley MD    glucose chewable tablet 16 g, 16 g, Oral, PRNGalindo Majid A., MD    glucose chewable tablet 24 g, 24 g, Oral, PRN, Jaymie Medley MD    insulin aspart U-100 pen 0-5 Units, 0-5 Units, Subcutaneous, QID (AC + HS) PRN, Jaymie Medley MD, 3 Units at 01/14/25 1116    melatonin tablet 6 mg, 6 mg, Oral, Nightly PRN, Jaymie Medley MD    mycophenolate tablet 500 mg, 500 mg, Oral, BID, Jaymie Medley MD, 500 mg at 01/14/25 0851    naloxone 0.4 mg/mL injection 0.02 mg, 0.02 mg, Intravenous, PRN, Jaymie Medley MD    ondansetron disintegrating tablet 8 mg, 8 mg, Oral, Q8H PRN, Jaymie Medley MD    ondansetron injection 4 mg, 4 mg, Intravenous, Q8H PRN, Jaymie Medley MD    pantoprazole EC tablet 40 mg, 40 mg, Oral, BID, Hilario Dahl PA-C, 40 mg at 01/14/25 0851    polyethylene glycol packet 17 g, 17 g, Oral, Daily PRN, Jaymie Medley MD    senna-docusate 8.6-50 mg per tablet 2 tablet, 2 tablet, Oral, Daily PRN, Jaymie Medley MD    sodium chloride 0.9% flush 10 mL, 10 mL, Intravenous, Q8H, Galindo,  "Jaymie BASURTO MD, 10 mL at 01/14/25 0516    sodium zirconium cyclosilicate packet 10 g, 10 g, Oral, Daily, Yoandy Bennett MD, 10 g at 01/14/25 0854    Review of patient's allergies indicates:   Allergen Reactions    Lisinopril Other (See Comments)     Other reaction(s):  cough    Actos  [pioglitazone] Other (See Comments)     Other reaction(s): CHF    Metformin Other (See Comments)     Other reaction(s): renal insuff  Other reaction(s): CHF       ROS:  10 point review of systems is negative except as mentioned in HPI.    Vitals:    01/14/25 1136   BP: 121/60   Pulse: (!) 119   Resp: 18   Temp: 98.8 °F (37.1 °C)       Objective:  General Appearance:  Ill-appearing, in no acute distress and not in pain.    Vital signs: (most recent): Blood pressure 121/60, pulse (!) 119, temperature 98.8 °F (37.1 °C), temperature source Oral, resp. rate 18, height 5' 7" (1.702 m), weight 128.7 kg (283 lb 11.7 oz), SpO2 96%.  No fever.    Output: Producing urine.    HEENT: Normal HEENT exam.    Lungs:  Normal effort.  He is not in respiratory distress.    Heart: Tachycardia.    Extremities: There is venous stasis and local swelling.  There is no deformity.  (Gross motor intact)  Pulses: Distal pulses are intact.  (Faint, but palpable pulses to Bilateral DPs)    Pupils:  Pupils are equal.   Skin:  Warm and dry.        LABS:  I have reviewed all pertinent lab results within the past 24 hours.  CBC:   Recent Labs   Lab 01/14/25  0516   WBC 3.01*   RBC 3.31*   HGB 8.3*   HCT 28.3*      MCV 86   MCH 25.1*   MCHC 29.3*     BMP:   Recent Labs   Lab 01/12/25  0639 01/12/25  0844 01/14/25  0516   *   < > 282*      < > 140   K 6.7*   < > 6.0*      < > 107   CO2 23   < > 19*   BUN 82*   < > 81*   CREATININE 4.3*   < > 4.3*   CALCIUM 9.8   < > 8.9   MG 2.9*  --   --     < > = values in this interval not displayed.       IMAGING:  I have personally reviewed the imaging.    US Lower Extremity Arteries Bilateral   Final " Result      Focal hemodynamically significant stenosis in the distal left SFA.      Other findings as above.         Electronically signed by: Salty Moreno   Date:    01/14/2025   Time:    10:08      US Transplant Kidney With Doppler   Final Result      1.  Interval increase in main transplant renal artery velocities, currently measuring 170 centimeters/second (previously measuring 146 centimeters/second).  There is also interval increase in range of resistive indices in the segmental arteries, ranging between 0.82 and 0.88 (previously measuring 0.73-0.76).  This can be seen with infectious or inflammatory process, or early rejection changes.  Negative for hydronephrosis.  Negative for renal lesions.         Electronically signed by: Joe Leach MD   Date:    01/12/2025   Time:    14:00      X-Ray Chest AP Portable   Final Result      1.  Negative for acute process involving the chest.      2.  Stable findings as noted above.         Electronically signed by: Joe Leach MD   Date:    01/12/2025   Time:    07:00      X-Ray Abdomen Flat And Erect   Final Result      1.  Negative for acute process.         Electronically signed by: Joe Leach MD   Date:    01/12/2025   Time:    06:56          MDM  Number of Diagnoses or Management Options  Acute hyperkalemia  Acute kidney injury superimposed on chronic kidney disease  Acute on chronic anemia  Chronic anticoagulation  Gastrointestinal hemorrhage with melena  History of kidney transplant  Immunosuppression due to drug therapy  Intravascular volume depletion  Vomiting and diarrhea  Weakness: new, needed workup  Diagnosis management comments: Leg swelling       Amount and/or Complexity of Data Reviewed  Clinical lab tests: reviewed  Tests in the radiology section of CPT®: reviewed  Tests in the medicine section of CPT®: reviewed  Discussion of test results with the performing providers: yes  Decide to obtain previous medical records or to obtain history from  someone other than the patient: yes  Obtain history from someone other than the patient: yes  Review and summarize past medical records: yes  Discuss the patient with other providers: yes  Independent visualization of images, tracings, or specimens: yes    Risk of Complications, Morbidity, and/or Mortality  Presenting problems: moderate  Diagnostic procedures: moderate  Management options: moderate    Patient Progress  Patient progress: stable        Assessment & Plan  Mitch Whittaker is a 69 y.o. male with hx of CKD and is s/p kidney transplant in 2015; admitted for ANGELITO and GI bleed. Found to have venous insufficiency and PAD. Patient with palpable pulses on exam. Discussed cares with family and patient. No vascular interventions warranted. Will continue with unna boot therapy to BLE.     I personally discussed this plan with Dr. Victor, who has reviewed the images and information regarding the patient and recommends the following plan:  Cares per [facility] for BLE unna boot therapy  No vascular surgery interventions  Can follow up in clinic in 3 months for formal MARÍA ELENA     I discussed this plan with the patient and he understands, agrees, and appreciates our input and would like to proceed with our above stated plan.    Chidi Alas  1/14/2025  [facility]  Vascular Surgery  [phone] (Clinic Number)    Active Hospital Problems    Diagnosis  POA    *Hyperkalemia [E87.5]  Yes    Melena [K92.1]  Yes    Gross hematuria [R31.0]  Yes    Tacrolimus-induced GI toxicity [K63.89, T45.1X5A]  Yes    CKD (chronic kidney disease) stage 4, GFR 15-29 ml/min [N18.4]  Yes    ABL Anemia plus anemia of CKD 4 [N18.9, D63.1]  Yes      Resolved Hospital Problems   No resolved problems to display.

## 2025-01-14 NOTE — HOSPITAL COURSE
69 y.o. male patient with a PMHx of CHF- EF 40%, anemia, hyperparathyroidism, type 2 DM, s/p kidney transplant 2015 on Prograf and Cellcept, DVT on Eliquis, MARION, HLD, HTN, CKD, Hep C, and arthritis who presents to the Emergency Department for evaluation of nausea vomiting and diarrhea over the past several days (both of which have improved), but felt weak and couldn't move around like normal. no abd pain, no systemic symptoms, stool now formed. Pt is a very poor historian. According to nursing staff, family reported that the pt has been sitting in his chair for the last 3 days. Pt reports that he has had two episodes of diarrhea since yesterday, but due to the increasing weakness in his knees he was unable to get up from his chair. Pt normally ambulates with assistance from a walker. Symptoms are constant and moderate in severity. Associated sxs include blood in stool (x4 days) and n/v yesterday, but none today after PO. Patient denies any fever, abdominal pain, appetite changes, SOB, dysuria, and all other sxs at this time. No prior tx. Pt is on Eliquis. No further complaints or concerns at this time.   In the ER, VS /65, , Sats 100% on RA, Afeb, WBC 3.7, H/H 9.5/33, Bun/Cr 82/4.3, K 6.7- treated aggressively in the ER and rechecked and repeat 5.4. He is heme positive as well. C Diff Neg. She is being admitted for possible Hyperkalemia, acute GI Bleed, ANGELITO.     1/13- Appreciate Renal and GI input- pt looks and feels a little better, stronger, more alert and responsive. Wife and daughter are here. His prograf level very high- 32- hence Held. It seems that he was getting Prograf toxicity at home which then resulted in Hematuria, ABL Anemia, ANGELITO and Hyperkalemia. Pt also felt weak and low sugars, so drank a lot of OJ which further raised his K level. Does not appears to have true GI bleed. But has hematuria- hence Purewick catheter placed and Dr. Bennett also started him on IVF- hematuria appears to be  clearing. Pt feeling better, will check labs in am and start PT/OT. K was 6.1 this am- started on lokelma.     1/14- looks a little better, no melena but still has hematuria. H/h stable, 8.3/28, K still 6, Bun.Cr still high 83/4. Repeat Prograf level pending. VSS Afeb, he is more alert and talking. Wound care wrapped his legs with moderate compression. Had mod R SFA stenosis, vascular evaluated him and will continue to monitor him, no surgery or angioplasty needed at this time. Will start PT/OT in am. Cont IVF, If Hematuria persists, start CBI in am.     1/15- looks better but c/o pain in his legs which are in a compression socks. Good response to iVF, Hematuria getting better and Cr down to 3 now with good urine output. H/H dropped to 7.2/22 sec to IVF and Hematuria. Still await repeat prograf level. Getting PT/OT, started on Norco for pain control.      1/16- resting quietly, comfortable, making good amount of urine, hematuria almost cleared with IVF. Bun/Cr down to 58/2.9. K normal, lokelm d/koki. H/H improved to 7.7/26. Prograf noiw 5.2, will restart at 3 mg bid. Stop hydration in am, start PT/OT. D/c home soon.     01/17/2025  Patient requiring max assists with bed transitions and feeding. High intensity therapy recommended by therapists. Patient previously independent, living at home. Referrals for rehab placed at this time.      01/18/2025  Some notable objective clinical improvement. Reports decreased ROM in UE bilaterally but predominantly his right UE. Radiography of the left hand and R shoulder unremarkable. Will obtain CT cervical spine to further assess for stenosis.     01/19/2025  Cervical spine CT shows right moderate foraminal stenosis at C3-4 and C5-6 secondary to spurring of the uncovertebral joints.  Will obtain MRI of spine and consult orthospine to assess to see if findings could be contributing to his significant UE weakness and pain.     1/20/2025:   MRI Cervical/Thoracic/Lumbar showed  multilevel spondylosis mild to moderate but no acute findings. Patient's daughter tells me patient independently prior to hospitalization. Patient complains of upper and lower bilateral weakness. While Prograf toxicity could play a part in progressive decline, he will need additional work up. CT head unremarkable. Kidney function has returned to baseline.  Vitamin B12 pending. Neurosurgery/Neurology consult pending. He is awaiting rehab vs SNF placement.     1/21/2025  MRI brain shows atrophy and microvascular changes but no acute findings. His progressive weakness was evaluated by Neurology who recommended obtaining thiamine, copper, vitamin E, and vitamin D level. Continues to have hematuria on UA. Order high risk urine culture and empirically start Rocephin. Will consider Urology consult pending hospital course.    1/22/2025  Kidney function stable. Hypothermic overnight, improved with heating blanket. Thyroid studies and infectious work up unremarkable. Suspect hypothermia could be related to chilled room condition. Plans for SNF.     1/23/2025  Patient continues to have bilateral elbow and right knee tenderness and <ROM. Discussed X-ray of knee findings with Orthopedic surgery who agrees to perform knee aspiration.  Cell count unremarkable. Crystals, Gram stain, and cultures are still pending. Will give dose of colchicine 0.6mg x 1 and monitor response.  Ann catheter discontinued today with plans for voiding trial. Plans to discharge to SNF.    1/24/2025  Aspiration consistent with gout. Uric acid elevated at 11. Steroids increased to 20 mg BID for now for acute gout flare. Renal function continues to improve and electrolytes are stable. Awaiting SNF placement.     1/25/2025  Responding well to steroids for gout exacerbation. Moving all extremities better. Long acting insulin optimized for steroid induced hyperglycemia. Awaiting SNF.    1/26/2025  Doing well today. Patient sat up on side of bed with standby  assistance this AM. He had an episode of dyspnea after waking yesterday evening. CXR shows low grade CHF, he received Lasix 20 mg IV x 1 dose with improvement. Patient has a history of CHF previously on Lasix 40mg BID, but was held due to acute on chronic renal failure. Renal function continues to remain stable, so will start Lasix 40mg daily. Orders placed for nocturnal CPAP. Medications optimized for steroid induced hyperglycemia.     01/27/2025  Patient doing well.  Will increase Lantus to 20 units b.i.d..  Awaiting placement.  01/28/2025  Glucose better controlled.  Continue Lantus.  Awaiting placement.    Patient was discharged to SNF.

## 2025-01-14 NOTE — ASSESSMENT & PLAN NOTE
Anemia is likely due to acute blood loss which was from CKD, s/p Renal transplant and chronic disease due to Chronic Kidney Disease. Most recent hemoglobin and hematocrit are listed below.  Recent Labs     01/12/25  1056 01/13/25  0056 01/13/25  0448   HGB 8.6* 8.7* 8.7*   HCT 29.1* 29.3* 29.0*       Plan  - Monitor serial CBC: Every 12 hours  - Transfuse PRBC if patient becomes hemodynamically unstable, symptomatic or H/H drops below 7/21.  - Patient has not received any PRBC transfusions to date  - Patient's anemia is currently stable  -   Likely sec to Hematuria/Possible GI Bleed from Eliquis/ASA sec to Prograf toxicity  H/h dropped from 11 to 9 to 8.7 now. Pt also getting IVF now- will know pilar value in am

## 2025-01-14 NOTE — PROGRESS NOTES
Nephrology Progress Note     History of Present Illness       The patient is a 69 y.o. male with a hx of previous ESRD likely related to diabetes and hypertension that was on dialysis several years prior to receiving a living related kidney transplant from his daughter in 2015.  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  He is followed by Dr. Gonsalez of [facility] seen last on 09/24/2024 at which time his serum creatinine was 2.2.  He was seen in the emergency department on 01/06/2025 complaining of increasing lower extremity edema.  At that time his serum creatinine was 2.8.  He returns to the emergency department 01/12/2025 with persistent lower extremity edema and associated blistering.  He also complains of bilateral knee pain to the point where he is unable to ambulate.  He attributes this to the change in weather.  His admission laboratory reveals a serum creatinine now up to 4.3 with a potassium of 5.4.  Nephrology has been asked to see and evaluate the patient.  As an outpatient he is chronically on Lasix 40 mg twice a day.  He denies the use of nonsteroidal anti-inflammatory drugs.  He denies dysuria hematuria or difficulty voiding.  He is chronically on Entresto.         Interval History   Overnight/currently:  Patient denies any chest pain, abdominal pain, nausea or vomiting.  He continues to have gross hematuria.        Allergies:    is allergic to lisinopril, actos  [pioglitazone], and metformin.    Current medications:   Scheduled Meds:   mycophenolate  500 mg Oral BID    pantoprazole  40 mg Oral BID    sodium chloride 0.9%  10 mL Intravenous Q8H    sodium zirconium cyclosilicate  10 g Oral Daily     Continuous Infusions:   0.9% NaCl   Intravenous Continuous 75 mL/hr  "at 01/14/25 0431 New Bag at 01/14/25 0431     PRN Meds:.  Current Facility-Administered Medications:     acetaminophen, 650 mg, Oral, Q4H PRN    dextrose 50%, 12.5 g, Intravenous, PRN    dextrose 50%, 25 g, Intravenous, PRN    glucagon (human recombinant), 1 mg, Intramuscular, PRN    glucose, 16 g, Oral, PRN    glucose, 24 g, Oral, PRN    insulin aspart U-100, 0-5 Units, Subcutaneous, QID (AC + HS) PRN    melatonin, 6 mg, Oral, Nightly PRN    naloxone, 0.02 mg, Intravenous, PRN    ondansetron, 8 mg, Oral, Q8H PRN    ondansetron, 4 mg, Intravenous, Q8H PRN    polyethylene glycol, 17 g, Oral, Daily PRN    senna-docusate 8.6-50 mg, 2 tablet, Oral, Daily PRN     Physical Examination     VS/Measurements    /60 (BP Location: Left arm, Patient Position: Lying)   Pulse (!) 119   Temp 98.8 °F (37.1 °C) (Oral)   Resp 18   Ht 5' 7" (1.702 m)   Wt 128.7 kg (283 lb 11.7 oz)   SpO2 96%   BMI 44.44 kg/m²         General:  Moderately built, not in any distress.  Neck:  Supple,   Respiratory: Non-labored,  Lungs are clear to auscultation.    Cardiovascular:  Normal rate, Regular rhythm.   Abdomen:  Soft, Non-tender, Normal bowel sounds.   Muskuloskeletal:  No pedal edema          Laboratory Results   Today's Lab Results :    Recent Results (from the past 24 hours)   POCT glucose    Collection Time: 01/13/25  1:36 PM   Result Value Ref Range    POCT Glucose 306 (H) 70 - 110 mg/dL   POCT glucose    Collection Time: 01/13/25  3:55 PM   Result Value Ref Range    POCT Glucose 316 (H) 70 - 110 mg/dL   POCT glucose    Collection Time: 01/13/25  9:29 PM   Result Value Ref Range    POCT Glucose 274 (H) 70 - 110 mg/dL   Basic metabolic panel    Collection Time: 01/13/25 10:58 PM   Result Value Ref Range    Sodium 139 136 - 145 mmol/L    Potassium 5.9 (H) 3.5 - 5.1 mmol/L    Chloride 106 95 - 110 mmol/L    CO2 19 (L) 23 - 29 mmol/L    Glucose 268 (H) 70 - 110 mg/dL    BUN 82 (H) 8 - 23 mg/dL    Creatinine 4.3 (H) 0.5 - 1.4 mg/dL "    Calcium 9.1 8.7 - 10.5 mg/dL    Anion Gap 14 8 - 16 mmol/L    eGFR 14 (A) >60 mL/min/1.73 m^2   EKG 12-lead    Collection Time: 01/14/25  1:59 AM   Result Value Ref Range    QRS Duration 136 ms    [facility] QTC Calculation 493 ms   POCT glucose    Collection Time: 01/14/25  5:13 AM   Result Value Ref Range    POCT Glucose 307 (H) 70 - 110 mg/dL   Basic Metabolic Panel (BMP)    Collection Time: 01/14/25  5:16 AM   Result Value Ref Range    Sodium 140 136 - 145 mmol/L    Potassium 6.0 (H) 3.5 - 5.1 mmol/L    Chloride 107 95 - 110 mmol/L    CO2 19 (L) 23 - 29 mmol/L    Glucose 282 (H) 70 - 110 mg/dL    BUN 81 (H) 8 - 23 mg/dL    Creatinine 4.3 (H) 0.5 - 1.4 mg/dL    Calcium 8.9 8.7 - 10.5 mg/dL    Anion Gap 14 8 - 16 mmol/L    eGFR 14 (A) >60 mL/min/1.73 m^2   CBC with Automated Differential    Collection Time: 01/14/25  5:16 AM   Result Value Ref Range    WBC 3.01 (L) 3.90 - 12.70 K/uL    RBC 3.31 (L) 4.60 - 6.20 M/uL    Hemoglobin 8.3 (L) 14.0 - 18.0 g/dL    Hematocrit 28.3 (L) 40.0 - 54.0 %    MCV 86 82 - 98 fL    MCH 25.1 (L) 27.0 - 31.0 pg    MCHC 29.3 (L) 32.0 - 36.0 g/dL    RDW 13.9 11.5 - 14.5 %    Platelets 231 150 - 450 K/uL    MPV 10.5 9.2 - 12.9 fL    Immature Granulocytes CANCELED 0.0 - 0.5 %    Immature Grans (Abs) CANCELED 0.00 - 0.04 K/uL    nRBC 0 0 /100 WBC    Gran % 65.0 38.0 - 73.0 %    Lymph % 11.0 (L) 18.0 - 48.0 %    Mono % 22.0 (H) 4.0 - 15.0 %    Eosinophil % 0.0 0.0 - 8.0 %    Basophil % 0.0 0.0 - 1.9 %    Bands 2.0 %    Platelet Estimate Clumped (A)     Aniso Slight     Poik Slight     Ovalocytes Occasional     Tear Drop Cells Occasional     Differential Method Manual    POCT glucose    Collection Time: 01/14/25 11:10 AM   Result Value Ref Range    POCT Glucose 279 (H) 70 - 110 mg/dL       LABS:  Reviewed Yes      Intake/Output Summary (Last 24 hours) at 1/14/2025 1212  Last data filed at 1/14/2025 0042  Gross per 24 hour   Intake --   Output 600 ml   Net -600 ml       Assessment and Plan      ANGELITO on CKD stage 4 secondary to possible IV VD complicated by Prograf toxicity.  I will increase IV fluids to 85 mL/hour.  We will hold Prograf.  His kidney transplant ultrasound did not reveal any obstruction but revealed increase in the main renal artery velocities.      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  His Prograf level on hold and continue CellCept for now.    Hematuria secondary to possible Ann trauma.  His anticoagulants on hold.  He is being start her on CBI.     Lower extremity edema with skin changes consistent with venous stasis     Hyperkalemia.  His Entresto and the potassium supplements on hold.  Continue on daily Lokelma.      Melena.  GI considering EGD once his potassium is stable.     Hypertension with renal disease.  His blood pressure is stable.     Diabetes with renal disease     CAD/CHF with diminished ejection fraction followed by Dr. Man of Cardiology.  His EF was 35% in 11/2024.  Watch closely for any fluid overload with gentle IV fluids.              ________________________________________________  Yoandy Bennett

## 2025-01-14 NOTE — CONSULTS
Patient on the hyperglycemic report. BG was 307 1/14/25 at 0513. Patient is currently ordered Low sliding scale insulin. Messaged hospital provider and informed of patients home dose of Lantus for possible adjustments to medications.

## 2025-01-14 NOTE — ASSESSMENT & PLAN NOTE
Hyperkalemia is likely due to Increased potassium intake.The patients most recent potassium results are listed below.  Recent Labs     01/12/25  0639 01/12/25  0844 01/13/25  0448   K 6.7* 5.4* 6.1*  6.1*       Plan  - Monitor for arrhythmias with EKG and/or continuous telemetry.   - Treat the hyperkalemia with Potassium Binders, Calcium gluconate, IV insulin and dextrose, Furosemide, and Sodium Bicarbonate.   - Monitor potassium: Every 12 hours  - The patient's hyperkalemia is worsening. Will continue current treatment    Pt was reportedly drinking lots of OJ ast home for last 3 days  Nephrology on board    Sec to ANGELITO on CKD 4 plus excess dietary Intake- better but still high- started on Lokelma and hold K supplements

## 2025-01-14 NOTE — ASSESSMENT & PLAN NOTE
Creatine stable for now. BMP reviewed- noted Estimated Creatinine Clearance: 19.2 mL/min (A) (based on SCr of 4.5 mg/dL (H)). according to latest data. Based on current GFR, CKD stage is stage 4 - GFR 15-29.  Monitor UOP and serial BMP and adjust therapy as needed. Renally dose meds. Avoid nephrotoxic medications and procedures.  Pt is d/p Renal Transplant in 2015, on Prograf and Cellcept    Edwina on CKD 4- sec to Prograf toxicity- Bleeding- started on IVF

## 2025-01-14 NOTE — PROGRESS NOTES
[facility] Medicine  Progress Note    Patient Name: Mitch Whittaker  MRN: [id number]  Patient Class: IP- Inpatient   Admission Date: 1/12/2025  Length of Stay: 1 days  Attending Physician: Jaymie Medley MD  Primary Care Provider: Valery Caal MD        Subjective     Principal Problem:Hyperkalemia        HPI:  Mitch Whittaker is a 69 y.o. male patient with a PMHx of CHF- EF 40%, anemia, hyperparathyroidism, type 2 DM, s/p kidney transplant 2015 on Prograf and Cellcept, DVT on Eliquis, MARION, HLD, HTN, CKD, Hep C, and arthritis who presents to the Emergency Department for evaluation of nausea vomiting and diarrhea over the past several days (both of which have improved), but felt weak and couldn't move around like normal. no abd pain, no systemic symptoms, stool now formed. Pt is a very poor historian. According to nursing staff, family reported that the pt has been sitting in his chair for the last 3 days. Pt reports that he has had two episodes of diarrhea since yesterday, but due to the increasing weakness in his knees he was unable to get up from his chair. Pt normally ambulates with assistance from a walker. Symptoms are constant and moderate in severity. Associated sxs include blood in stool (x4 days) and n/v yesterday, but none today after PO. Patient denies any fever, abdominal pain, appetite changes, SOB, dysuria, and all other sxs at this time. No prior tx. Pt is on Eliquis. No further complaints or concerns at this time.   In the ER, VS /65, , Sats 100% on RA, Afeb, WBC 3.7, H/H 9.5/33, Bun/Cr 82/4.3, K 6.7- treated aggressively in the ER and rechecked and repeat 5.4. He is heme positive as well. C Diff Neg. She is being admitted for possible Hyperkalemia, acute GI Bleed, ANGELITO.     Overview/Hospital Course:  69 y.o. male patient with a PMHx of CHF- EF 40%, anemia, hyperparathyroidism, type 2 DM, s/p kidney transplant 2015 on Prograf and Cellcept, DVT on  Eliquis, MARION, HLD, HTN, CKD, Hep C, and arthritis who presents to the Emergency Department for evaluation of nausea vomiting and diarrhea over the past several days (both of which have improved), but felt weak and couldn't move around like normal. no abd pain, no systemic symptoms, stool now formed. Pt is a very poor historian. According to nursing staff, family reported that the pt has been sitting in his chair for the last 3 days. Pt reports that he has had two episodes of diarrhea since yesterday, but due to the increasing weakness in his knees he was unable to get up from his chair. Pt normally ambulates with assistance from a walker. Symptoms are constant and moderate in severity. Associated sxs include blood in stool (x4 days) and n/v yesterday, but none today after PO. Patient denies any fever, abdominal pain, appetite changes, SOB, dysuria, and all other sxs at this time. No prior tx. Pt is on Eliquis. No further complaints or concerns at this time.   In the ER, VS /65, , Sats 100% on RA, Afeb, WBC 3.7, H/H 9.5/33, Bun/Cr 82/4.3, K 6.7- treated aggressively in the ER and rechecked and repeat 5.4. He is heme positive as well. C Diff Neg. She is being admitted for possible Hyperkalemia, acute GI Bleed, ANGELITO.     1/13- Appreciate Renal and GI input- pt looks and feels a little better, stronger, more alert and responsive. Wife and daughter are here. His prograf level very high- 32- hence Held. It seems that he was getting Prograf toxicity at home which then resulted in Hematuria, ABL Anemia, ANGELITO and Hyperkalemia. Pt also felt weak and low sugars, so drank a lot of OJ which further raised his K level. Does not appears to have true GI bleed. But has hematuria- hence Purewick catheter placed and Dr. Bennett also started him on IVF- hematuria appears to be clearing. Pt feeling better, will check labs in am and start PT/OT. K was 6.1 this am- started on lokelma.     Interval History: Appreciate Renal and  GI input- pt looks and feels a little better, stronger, more alert and responsive. Wife and daughter are here. His prograf level very high- 32- hence Held. It seems that he was getting Prograf toxicity at home which then resulted in Hematuria, ABL Anemia, ANGELITO and Hyperkalemia. Pt also felt weak and low sugars, so drank a lot of OJ which further raised his K level. Does not appears to have true GI bleed. But has hematuria- hence Purewick catheter placed and Dr. Bennett also started him on IVF- hematuria appears to be clearing. Pt feeling better, will check labs in am and start PT/OT.      Review of Systems   Constitutional:  Positive for activity change and appetite change. Negative for fever.   HENT:  Negative for hearing loss.    Eyes:  Negative for visual disturbance.   Respiratory:  Negative for cough and shortness of breath.    Cardiovascular:  Positive for chest pain (mild nagging pain in midline) and leg swelling. Negative for palpitations.   Gastrointestinal:         As per HPI.   Genitourinary:  Negative for difficulty urinating, dysuria, frequency and hematuria.   Musculoskeletal:  Negative for arthralgias and back pain.   Skin:  Positive for pallor and wound. Negative for color change.   Neurological:  Positive for weakness. Negative for seizures, syncope, numbness and headaches.   Hematological:  Does not bruise/bleed easily.   Psychiatric/Behavioral:  The patient is not nervous/anxious.      Objective:     Vital Signs (Most Recent):  Temp: 98.2 °F (36.8 °C) (01/13/25 1627)  Pulse: (!) 115 (01/13/25 1801)  Resp: 17 (01/13/25 1627)  BP: 135/63 (01/13/25 1627)  SpO2: 96 % (01/13/25 1627) Vital Signs (24h Range):  Temp:  [97.8 °F (36.6 °C)-100.1 °F (37.8 °C)] 98.2 °F (36.8 °C)  Pulse:  [109-128] 115  Resp:  [16-20] 17  SpO2:  [91 %-97 %] 96 %  BP: (127-142)/(55-69) 135/63     Weight: 126 kg (277 lb 12.5 oz)  Body mass index is 43.51 kg/m².    Intake/Output Summary (Last 24 hours) at 1/13/2025 1835  Last  data filed at 1/13/2025 1330  Gross per 24 hour   Intake 240 ml   Output 250 ml   Net -10 ml         Physical Exam  Constitutional:       General: He is not in acute distress.     Appearance: Normal appearance. He is well-developed. He is obese. He is ill-appearing. He is not toxic-appearing or diaphoretic.   HENT:      Head: Normocephalic and atraumatic.   Eyes:      Extraocular Movements: Extraocular movements intact.   Cardiovascular:      Rate and Rhythm: Regular rhythm. Tachycardia present.      Heart sounds: Normal heart sounds. No murmur heard.  Pulmonary:      Effort: Pulmonary effort is normal. No respiratory distress.      Breath sounds: Normal breath sounds. No wheezing.   Abdominal:      General: Bowel sounds are normal. There is no distension.      Palpations: Abdomen is soft. There is no mass.      Tenderness: There is no abdominal tenderness.   Musculoskeletal:         General: Swelling present.      Cervical back: Normal range of motion and neck supple.      Right lower leg: Edema present.      Left lower leg: Edema present.   Skin:     General: Skin is warm and dry.      Capillary Refill: Capillary refill takes 2 to 3 seconds.   Neurological:      General: No focal deficit present.      Mental Status: He is alert and oriented to person, place, and time.      Cranial Nerves: No cranial nerve deficit.   Psychiatric:         Mood and Affect: Mood normal.         Behavior: Behavior normal.         Thought Content: Thought content normal.         Judgment: Judgment normal.             Significant Labs: All pertinent labs within the past 24 hours have been reviewed.  BMP:   Recent Labs   Lab 01/12/25  0639 01/12/25  0844 01/13/25  0448   *   < > 267*  261*      < > 142  141   K 6.7*   < > 6.1*  6.1*      < > 108  107   CO2 23   < > 25  21*   BUN 82*   < > 84*  81*   CREATININE 4.3*   < > 4.3*  4.5*   CALCIUM 9.8   < > 8.6*  9.6   MG 2.9*  --   --     < > = values in this  interval not displayed.     CBC:   Recent Labs   Lab 01/12/25  1056 01/13/25  0056 01/13/25  0448   WBC 3.14* 2.84* 2.96*   HGB 8.6* 8.7* 8.7*   HCT 29.1* 29.3* 29.0*    228 220     CMP:   Recent Labs   Lab 01/12/25  0639 01/12/25  0844 01/13/25  0448    139 142  141   K 6.7* 5.4* 6.1*  6.1*    105 108  107   CO2 23 21* 25  21*   * 246* 267*  261*   BUN 82* 80* 84*  81*   CREATININE 4.3* 4.3* 4.3*  4.5*   CALCIUM 9.8 9.6 8.6*  9.6   PROT 6.2  --   --    ALBUMIN 2.3*  --  1.9*   BILITOT 0.5  --   --    ALKPHOS 96  --   --    AST 17  --   --    ALT 11  --   --    ANIONGAP 13 13 9  13     Cardiac Markers:   Recent Labs   Lab 01/12/25  0639   *     Coagulation:   Lactic Acid:   Recent Labs   Lab 01/12/25  0639   LACTATE 1.1     Magnesium:   Recent Labs   Lab 01/12/25  0639   MG 2.9*     Troponin:     Significant Imaging: I have reviewed all pertinent imaging results/findings within the past 24 hours.    Assessment and Plan     * Hyperkalemia  Hyperkalemia is likely due to Increased potassium intake.The patients most recent potassium results are listed below.  Recent Labs     01/12/25  0639 01/12/25  0844 01/13/25  0448   K 6.7* 5.4* 6.1*  6.1*       Plan  - Monitor for arrhythmias with EKG and/or continuous telemetry.   - Treat the hyperkalemia with Potassium Binders, Calcium gluconate, IV insulin and dextrose, Furosemide, and Sodium Bicarbonate.   - Monitor potassium: Every 12 hours  - The patient's hyperkalemia is worsening. Will continue current treatment    Pt was reportedly drinking lots of OJ ast home for last 3 days  Nephrology on board    Sec to ANGELITO on CKD 4 plus excess dietary Intake- better but still high- started on Lokelma and hold K supplements        ABL Anemia plus anemia of CKD 4  Anemia is likely due to acute blood loss which was from CKD, s/p Renal transplant and chronic disease due to Chronic Kidney Disease. Most recent hemoglobin and hematocrit are listed  below.  Recent Labs     01/12/25  1056 01/13/25  0056 01/13/25  0448   HGB 8.6* 8.7* 8.7*   HCT 29.1* 29.3* 29.0*       Plan  - Monitor serial CBC: Every 12 hours  - Transfuse PRBC if patient becomes hemodynamically unstable, symptomatic or H/H drops below 7/21.  - Patient has not received any PRBC transfusions to date  - Patient's anemia is currently stable  -   Likely sec to Hematuria/Possible GI Bleed from Eliquis/ASA sec to Prograf toxicity  H/h dropped from 11 to 9 to 8.7 now. Pt also getting IVF now- will know pilar value in am    Melena  +ve melena reported at home and Stool Heme +ve here with a drop in H/H here from 11 to 9.5 and then to 8.6. no hematemesis. Pt was on Eliquis and ASA- on hold  GI Consulted  Protonix    No obvious melena  GI on board  Possible Prograf induced Eliquis toxicity      Gross hematuria  Stable, has Purewick catheter now  Getting IVF, if gets worse, may need CBI      Tacrolimus-induced GI toxicity  Prograf level very high  Prograf on hold      CKD (chronic kidney disease) stage 4, GFR 15-29 ml/min  Creatine stable for now. BMP reviewed- noted Estimated Creatinine Clearance: 19.2 mL/min (A) (based on SCr of 4.5 mg/dL (H)). according to latest data. Based on current GFR, CKD stage is stage 4 - GFR 15-29.  Monitor UOP and serial BMP and adjust therapy as needed. Renally dose meds. Avoid nephrotoxic medications and procedures.  Pt is d/p Renal Transplant in 2015, on Prograf and Cellcept    Edwina on CKD 4- sec to Prograf toxicity- Bleeding- started on IVF      VTE Risk Mitigation (From admission, onward)           Ordered     Reason for No Pharmacological VTE Prophylaxis  Once        Question:  Reasons:  Answer:  Active Bleeding    01/12/25 1729     IP VTE HIGH RISK PATIENT  Once         01/12/25 1729     Place sequential compression device  Until discontinued         01/12/25 1729                    Discharge Planning   GRETEL:      Code Status: Full Code   Medical Readiness for Discharge  Date:   Discharge Plan A: Home with family, Home          Jaymie Medley MD  Department of Hospital Medicine   [place] - Telemetry ([facility])

## 2025-01-14 NOTE — PROGRESS NOTES
F/U evaluation of arterial studies.  Noted patient not able to tolerate MARÍA ELENA's due to c/o pain.  Results of u/s indicate hemodynamically significant stenosis in the distal left SFA.    Do not feel compression is appropriate in presence of arterial compromise.  Sent message to Dr. Medley Re:  need to consult Vascular team either inpatient or outpatient.    11:30 am spoke w/ Dr. Medley Re:  application of compression to calves as he feels area of stenosis is proximal.  Agreed w/ light compression using Tubigrip.  Applied BLE Tubigrip Size G  in single layer.  Will update nursing wound care orders.  Awaiting Vascular consult.  Plan f/u early next week.

## 2025-01-14 NOTE — PLAN OF CARE
Problem: Adult Inpatient Plan of Care  Goal: Plan of Care Review  Outcome: Progressing  Goal: Patient-Specific Goal (Individualized)  Outcome: Progressing  Goal: Absence of Hospital-Acquired Illness or Injury  Outcome: Progressing  Goal: Optimal Comfort and Wellbeing  Outcome: Progressing  Goal: Readiness for Transition of Care  Outcome: Progressing     Problem: Bariatric Environmental Safety  Goal: Safety Maintained with Care  Outcome: Progressing     Problem: Diabetes Comorbidity  Goal: Blood Glucose Level Within Targeted Range  Outcome: Progressing     Problem: Wound  Goal: Optimal Coping  Outcome: Progressing  Goal: Optimal Functional Ability  Outcome: Progressing  Goal: Absence of Infection Signs and Symptoms  Outcome: Progressing  Goal: Improved Oral Intake  Outcome: Progressing  Goal: Optimal Pain Control and Function  Outcome: Progressing  Goal: Skin Health and Integrity  Outcome: Progressing  Goal: Optimal Wound Healing  Outcome: Progressing     Problem: Skin Injury Risk Increased  Goal: Skin Health and Integrity  Outcome: Progressing   POC reviewed with pt. Pt verbalizes understanding of POC.   AAOx3, disoriented to place. NADN.  Sinus tach with BBB, run of 7 beat vtach throughout the night. Pt tachypneic, no other symptoms.  Pt remains free of falls. Turned q2h.  No complaints at this time.  Safety measures in place. Will continue to monitor.  Informed pt to call for assistance before getting up. Pt verbalizes understanding.  Hourly rounding and chart check complete.

## 2025-01-14 NOTE — ASSESSMENT & PLAN NOTE
+ve melena reported at home and Stool Heme +ve here with a drop in H/H here from 11 to 9.5 and then to 8.6. no hematemesis. Pt was on Eliquis and ASA- on hold  GI Consulted  Protonix    No obvious melena  GI on board  Possible Prograf induced Eliquis toxicity

## 2025-01-14 NOTE — SUBJECTIVE & OBJECTIVE
Interval History: Appreciate Renal and GI input- pt looks and feels a little better, stronger, more alert and responsive. Wife and daughter are here. His prograf level very high- 32- hence Held. It seems that he was getting Prograf toxicity at home which then resulted in Hematuria, ABL Anemia, ANGELITO and Hyperkalemia. Pt also felt weak and low sugars, so drank a lot of OJ which further raised his K level. Does not appears to have true GI bleed. But has hematuria- hence Purewick catheter placed and Dr. Bennett also started him on IVF- hematuria appears to be clearing. Pt feeling better, will check labs in am and start PT/OT.      Review of Systems   Constitutional:  Positive for activity change and appetite change. Negative for fever.   HENT:  Negative for hearing loss.    Eyes:  Negative for visual disturbance.   Respiratory:  Negative for cough and shortness of breath.    Cardiovascular:  Positive for chest pain (mild nagging pain in midline) and leg swelling. Negative for palpitations.   Gastrointestinal:         As per HPI.   Genitourinary:  Negative for difficulty urinating, dysuria, frequency and hematuria.   Musculoskeletal:  Negative for arthralgias and back pain.   Skin:  Positive for pallor and wound. Negative for color change.   Neurological:  Positive for weakness. Negative for seizures, syncope, numbness and headaches.   Hematological:  Does not bruise/bleed easily.   Psychiatric/Behavioral:  The patient is not nervous/anxious.      Objective:     Vital Signs (Most Recent):  Temp: 98.2 °F (36.8 °C) (01/13/25 1627)  Pulse: (!) 115 (01/13/25 1801)  Resp: 17 (01/13/25 1627)  BP: 135/63 (01/13/25 1627)  SpO2: 96 % (01/13/25 1627) Vital Signs (24h Range):  Temp:  [97.8 °F (36.6 °C)-100.1 °F (37.8 °C)] 98.2 °F (36.8 °C)  Pulse:  [109-128] 115  Resp:  [16-20] 17  SpO2:  [91 %-97 %] 96 %  BP: (127-142)/(55-69) 135/63     Weight: 126 kg (277 lb 12.5 oz)  Body mass index is 43.51 kg/m².    Intake/Output Summary  (Last 24 hours) at 1/13/2025 1834  Last data filed at 1/13/2025 1330  Gross per 24 hour   Intake 240 ml   Output 250 ml   Net -10 ml         Physical Exam  Constitutional:       General: He is not in acute distress.     Appearance: Normal appearance. He is well-developed. He is obese. He is ill-appearing. He is not toxic-appearing or diaphoretic.   HENT:      Head: Normocephalic and atraumatic.   Eyes:      Extraocular Movements: Extraocular movements intact.   Cardiovascular:      Rate and Rhythm: Regular rhythm. Tachycardia present.      Heart sounds: Normal heart sounds. No murmur heard.  Pulmonary:      Effort: Pulmonary effort is normal. No respiratory distress.      Breath sounds: Normal breath sounds. No wheezing.   Abdominal:      General: Bowel sounds are normal. There is no distension.      Palpations: Abdomen is soft. There is no mass.      Tenderness: There is no abdominal tenderness.   Musculoskeletal:         General: Swelling present.      Cervical back: Normal range of motion and neck supple.      Right lower leg: Edema present.      Left lower leg: Edema present.   Skin:     General: Skin is warm and dry.      Capillary Refill: Capillary refill takes 2 to 3 seconds.   Neurological:      General: No focal deficit present.      Mental Status: He is alert and oriented to person, place, and time.      Cranial Nerves: No cranial nerve deficit.   Psychiatric:         Mood and Affect: Mood normal.         Behavior: Behavior normal.         Thought Content: Thought content normal.         Judgment: Judgment normal.             Significant Labs: All pertinent labs within the past 24 hours have been reviewed.  BMP:   Recent Labs   Lab 01/12/25  0639 01/12/25  0844 01/13/25  0448   *   < > 267*  261*      < > 142  141   K 6.7*   < > 6.1*  6.1*      < > 108  107   CO2 23   < > 25  21*   BUN 82*   < > 84*  81*   CREATININE 4.3*   < > 4.3*  4.5*   CALCIUM 9.8   < > 8.6*  9.6   MG 2.9*   --   --     < > = values in this interval not displayed.     CBC:   Recent Labs   Lab 01/12/25  1056 01/13/25  0056 01/13/25  0448   WBC 3.14* 2.84* 2.96*   HGB 8.6* 8.7* 8.7*   HCT 29.1* 29.3* 29.0*    228 220     CMP:   Recent Labs   Lab 01/12/25  0639 01/12/25  0844 01/13/25 0448    139 142  141   K 6.7* 5.4* 6.1*  6.1*    105 108  107   CO2 23 21* 25  21*   * 246* 267*  261*   BUN 82* 80* 84*  81*   CREATININE 4.3* 4.3* 4.3*  4.5*   CALCIUM 9.8 9.6 8.6*  9.6   PROT 6.2  --   --    ALBUMIN 2.3*  --  1.9*   BILITOT 0.5  --   --    ALKPHOS 96  --   --    AST 17  --   --    ALT 11  --   --    ANIONGAP 13 13 9  13     Cardiac Markers:   Recent Labs   Lab 01/12/25  0639   *     Coagulation:   Lactic Acid:   Recent Labs   Lab 01/12/25  0639   LACTATE 1.1     Magnesium:   Recent Labs   Lab 01/12/25  0639   MG 2.9*     Troponin:     Significant Imaging: I have reviewed all pertinent imaging results/findings within the past 24 hours.

## 2025-01-15 DIAGNOSIS — I73.9 PERIPHERAL ARTERY DISEASE: Primary | ICD-10-CM

## 2025-01-15 LAB
ALBUMIN SERPL BCP-MCNC: 1.2 G/DL (ref 3.5–5.2)
ANION GAP SERPL CALC-SCNC: 10 MMOL/L (ref 8–16)
ANION GAP SERPL CALC-SCNC: 10 MMOL/L (ref 8–16)
BASOPHILS NFR BLD: 0 % (ref 0–1.9)
BUN SERPL-MCNC: 64 MG/DL (ref 8–23)
BUN SERPL-MCNC: 64 MG/DL (ref 8–23)
CALCIUM SERPL-MCNC: 7.4 MG/DL (ref 8.7–10.5)
CALCIUM SERPL-MCNC: 7.4 MG/DL (ref 8.7–10.5)
CALPROTECTIN STL-MCNT: 95.4 MCG/G
CHLORIDE SERPL-SCNC: 116 MMOL/L (ref 95–110)
CHLORIDE SERPL-SCNC: 116 MMOL/L (ref 95–110)
CO2 SERPL-SCNC: 16 MMOL/L (ref 23–29)
CO2 SERPL-SCNC: 16 MMOL/L (ref 23–29)
CREAT SERPL-MCNC: 3 MG/DL (ref 0.5–1.4)
CREAT SERPL-MCNC: 3 MG/DL (ref 0.5–1.4)
DACRYOCYTES BLD QL SMEAR: ABNORMAL
DIFFERENTIAL METHOD BLD: ABNORMAL
EOSINOPHIL NFR BLD: 2 % (ref 0–8)
ERYTHROCYTE [DISTWIDTH] IN BLOOD BY AUTOMATED COUNT: 13.8 % (ref 11.5–14.5)
EST. GFR  (NO RACE VARIABLE): 22 ML/MIN/1.73 M^2
EST. GFR  (NO RACE VARIABLE): 22 ML/MIN/1.73 M^2
GLUCOSE SERPL-MCNC: 198 MG/DL (ref 70–110)
GLUCOSE SERPL-MCNC: 198 MG/DL (ref 70–110)
HCT VFR BLD AUTO: 25 % (ref 40–54)
HGB BLD-MCNC: 7.2 G/DL (ref 14–18)
IMM GRANULOCYTES # BLD AUTO: ABNORMAL K/UL (ref 0–0.04)
IMM GRANULOCYTES NFR BLD AUTO: ABNORMAL % (ref 0–0.5)
LACTATE SERPL-SCNC: 0.6 MMOL/L (ref 0.5–2.2)
LACTATE SERPL-SCNC: 0.7 MMOL/L (ref 0.5–2.2)
LYMPHOCYTES NFR BLD: 22 % (ref 18–48)
MCH RBC QN AUTO: 24.6 PG (ref 27–31)
MCHC RBC AUTO-ENTMCNC: 28.8 G/DL (ref 32–36)
MCV RBC AUTO: 85 FL (ref 82–98)
MONOCYTES NFR BLD: 12 % (ref 4–15)
NEUTROPHILS NFR BLD: 59 % (ref 38–73)
NEUTS BAND NFR BLD MANUAL: 5 %
NRBC BLD-RTO: 0 /100 WBC
OVALOCYTES BLD QL SMEAR: ABNORMAL
PHOSPHATE SERPL-MCNC: 3.6 MG/DL (ref 2.7–4.5)
PLATELET # BLD AUTO: 199 K/UL (ref 150–450)
PLATELET BLD QL SMEAR: ABNORMAL
PMV BLD AUTO: 10.3 FL (ref 9.2–12.9)
POCT GLUCOSE: 217 MG/DL (ref 70–110)
POCT GLUCOSE: 241 MG/DL (ref 70–110)
POCT GLUCOSE: 249 MG/DL (ref 70–110)
POCT GLUCOSE: 279 MG/DL (ref 70–110)
POIKILOCYTOSIS BLD QL SMEAR: SLIGHT
POTASSIUM SERPL-SCNC: 4.3 MMOL/L (ref 3.5–5.1)
POTASSIUM SERPL-SCNC: 4.3 MMOL/L (ref 3.5–5.1)
RBC # BLD AUTO: 2.93 M/UL (ref 4.6–6.2)
SODIUM SERPL-SCNC: 142 MMOL/L (ref 136–145)
SODIUM SERPL-SCNC: 142 MMOL/L (ref 136–145)
TACROLIMUS BLD-MCNC: 8.9 NG/ML (ref 5–15)
TARGETS BLD QL SMEAR: ABNORMAL
WBC # BLD AUTO: 2.54 K/UL (ref 3.9–12.7)

## 2025-01-15 PROCEDURE — 80069 RENAL FUNCTION PANEL: CPT | Performed by: INTERNAL MEDICINE

## 2025-01-15 PROCEDURE — 85007 BL SMEAR W/DIFF WBC COUNT: CPT | Performed by: EMERGENCY MEDICINE

## 2025-01-15 PROCEDURE — 80197 ASSAY OF TACROLIMUS: CPT | Performed by: INTERNAL MEDICINE

## 2025-01-15 PROCEDURE — 83605 ASSAY OF LACTIC ACID: CPT | Performed by: EMERGENCY MEDICINE

## 2025-01-15 PROCEDURE — 25000003 PHARM REV CODE 250: Performed by: PHYSICIAN ASSISTANT

## 2025-01-15 PROCEDURE — 99232 SBSQ HOSP IP/OBS MODERATE 35: CPT | Mod: ,,, | Performed by: INTERNAL MEDICINE

## 2025-01-15 PROCEDURE — 21400001 HC TELEMETRY ROOM

## 2025-01-15 PROCEDURE — 85027 COMPLETE CBC AUTOMATED: CPT | Performed by: EMERGENCY MEDICINE

## 2025-01-15 PROCEDURE — 25000003 PHARM REV CODE 250: Performed by: INTERNAL MEDICINE

## 2025-01-15 PROCEDURE — 63600175 PHARM REV CODE 636 W HCPCS: Performed by: EMERGENCY MEDICINE

## 2025-01-15 PROCEDURE — 36415 COLL VENOUS BLD VENIPUNCTURE: CPT | Performed by: EMERGENCY MEDICINE

## 2025-01-15 PROCEDURE — 25000003 PHARM REV CODE 250: Performed by: EMERGENCY MEDICINE

## 2025-01-15 PROCEDURE — 36415 COLL VENOUS BLD VENIPUNCTURE: CPT | Performed by: INTERNAL MEDICINE

## 2025-01-15 PROCEDURE — A4216 STERILE WATER/SALINE, 10 ML: HCPCS | Performed by: EMERGENCY MEDICINE

## 2025-01-15 RX ORDER — HYDROCODONE BITARTRATE AND ACETAMINOPHEN 5; 325 MG/1; MG/1
1 TABLET ORAL EVERY 6 HOURS PRN
Status: DISCONTINUED | OUTPATIENT
Start: 2025-01-15 | End: 2025-01-29 | Stop reason: HOSPADM

## 2025-01-15 RX ADMIN — PANTOPRAZOLE SODIUM 40 MG: 40 TABLET, DELAYED RELEASE ORAL at 08:01

## 2025-01-15 RX ADMIN — SODIUM CHLORIDE: 9 INJECTION, SOLUTION INTRAVENOUS at 03:01

## 2025-01-15 RX ADMIN — INSULIN GLARGINE 10 UNITS: 100 INJECTION, SOLUTION SUBCUTANEOUS at 08:01

## 2025-01-15 RX ADMIN — PANTOPRAZOLE SODIUM 40 MG: 40 TABLET, DELAYED RELEASE ORAL at 09:01

## 2025-01-15 RX ADMIN — ACETAMINOPHEN 650 MG: 325 TABLET ORAL at 06:01

## 2025-01-15 RX ADMIN — Medication 10 ML: at 02:01

## 2025-01-15 RX ADMIN — Medication 10 ML: at 06:01

## 2025-01-15 RX ADMIN — SODIUM ZIRCONIUM CYCLOSILICATE 10 G: 5 POWDER, FOR SUSPENSION ORAL at 08:01

## 2025-01-15 RX ADMIN — SODIUM CHLORIDE: 9 INJECTION, SOLUTION INTRAVENOUS at 02:01

## 2025-01-15 RX ADMIN — INSULIN GLARGINE 10 UNITS: 100 INJECTION, SOLUTION SUBCUTANEOUS at 09:01

## 2025-01-15 RX ADMIN — INSULIN ASPART 6 UNITS: 100 INJECTION, SOLUTION INTRAVENOUS; SUBCUTANEOUS at 11:01

## 2025-01-15 RX ADMIN — INSULIN ASPART 2 UNITS: 100 INJECTION, SOLUTION INTRAVENOUS; SUBCUTANEOUS at 09:01

## 2025-01-15 RX ADMIN — MYCOPHENOLATE MOFETIL 500 MG: 500 TABLET, FILM COATED ORAL at 08:01

## 2025-01-15 RX ADMIN — HYDROCODONE BITARTRATE AND ACETAMINOPHEN 1 TABLET: 5; 325 TABLET ORAL at 02:01

## 2025-01-15 RX ADMIN — MYCOPHENOLATE MOFETIL 500 MG: 500 TABLET, FILM COATED ORAL at 09:01

## 2025-01-15 RX ADMIN — INSULIN ASPART 4 UNITS: 100 INJECTION, SOLUTION INTRAVENOUS; SUBCUTANEOUS at 06:01

## 2025-01-15 RX ADMIN — INSULIN ASPART 4 UNITS: 100 INJECTION, SOLUTION INTRAVENOUS; SUBCUTANEOUS at 05:01

## 2025-01-15 NOTE — CONSULTS
Nephrology Progress Note     History of Present Illness       The patient is a 69 y.o. male with a hx of previous ESRD likely related to diabetes and hypertension that was on dialysis several years prior to receiving a living related kidney transplant from his daughter in 2015.  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  He is followed by Dr. Gonsalez of [facility] seen last on 09/24/2024 at which time his serum creatinine was 2.2.  He was seen in the emergency department on 01/06/2025 complaining of increasing lower extremity edema.  At that time his serum creatinine was 2.8.  He returns to the emergency department 01/12/2025 with persistent lower extremity edema and associated blistering.  He also complains of bilateral knee pain to the point where he is unable to ambulate.  He attributes this to the change in weather.  His admission laboratory reveals a serum creatinine now up to 4.3 with a potassium of 5.4.  Nephrology has been asked to see and evaluate the patient.  As an outpatient he is chronically on Lasix 40 mg twice a day.  He denies the use of nonsteroidal anti-inflammatory drugs.  He denies dysuria hematuria or difficulty voiding.  He is chronically on Entresto.      Interval History   Overnight/currently:   Patient denies any chest pain, short of breath, nausea or vomiting.  He is making good urine output.        Allergies:    is allergic to lisinopril, actos  [pioglitazone], and metformin.    Current medications:   Scheduled Meds:   insulin glargine U-100  10 Units Subcutaneous BID    mycophenolate  500 mg Oral BID    pantoprazole  40 mg Oral BID    sodium chloride 0.9%  10 mL Intravenous Q8H    sodium zirconium cyclosilicate  10 g Oral Daily     Continuous Infusions:    "0.9% NaCl   Intravenous Continuous 85 mL/hr at 01/15/25 0252 New Bag at 01/15/25 0252     PRN Meds:.  Current Facility-Administered Medications:     acetaminophen, 650 mg, Oral, Q4H PRN    dextrose 50%, 12.5 g, Intravenous, PRN    dextrose 50%, 25 g, Intravenous, PRN    glucagon (human recombinant), 1 mg, Intramuscular, PRN    glucose, 16 g, Oral, PRN    glucose, 24 g, Oral, PRN    insulin aspart U-100, 0-10 Units, Subcutaneous, QID (AC + HS) PRN    melatonin, 6 mg, Oral, Nightly PRN    naloxone, 0.02 mg, Intravenous, PRN    ondansetron, 8 mg, Oral, Q8H PRN    ondansetron, 4 mg, Intravenous, Q8H PRN    polyethylene glycol, 17 g, Oral, Daily PRN    senna-docusate 8.6-50 mg, 2 tablet, Oral, Daily PRN     Physical Examination     VS/Measurements    BP (!) 117/56 (BP Location: Left arm, Patient Position: Lying)   Pulse (!) 111   Temp 98.3 °F (36.8 °C) (Oral)   Resp 18   Ht 5' 7" (1.702 m)   Wt 128.7 kg (283 lb 11.7 oz)   SpO2 96%   BMI 44.44 kg/m²         General:   Obese, not in any distress.  Neck:  Supple,   Respiratory: Non-labored,  Lungs are clear to auscultation.    Cardiovascular:  Normal rate, Regular rhythm.   Abdomen:  Soft, Non-tender, Normal bowel sounds.   Muskuloskeletal:   pedal edema +          Laboratory Results   Today's Lab Results :    Recent Results (from the past 24 hours)   POCT glucose    Collection Time: 01/14/25 12:59 PM   Result Value Ref Range    POCT Glucose 297 (H) 70 - 110 mg/dL   POCT glucose    Collection Time: 01/14/25  4:08 PM   Result Value Ref Range    POCT Glucose 286 (H) 70 - 110 mg/dL   POCT glucose    Collection Time: 01/14/25  9:09 PM   Result Value Ref Range    POCT Glucose 254 (H) 70 - 110 mg/dL   Basic Metabolic Panel (BMP)    Collection Time: 01/15/25  5:00 AM   Result Value Ref Range    Sodium 142 136 - 145 mmol/L    Potassium 4.3 3.5 - 5.1 mmol/L    Chloride 116 (H) 95 - 110 mmol/L    CO2 16 (L) 23 - 29 mmol/L    Glucose 198 (H) 70 - 110 mg/dL    BUN 64 (H) 8 - 23 " mg/dL    Creatinine 3.0 (H) 0.5 - 1.4 mg/dL    Calcium 7.4 (L) 8.7 - 10.5 mg/dL    Anion Gap 10 8 - 16 mmol/L    eGFR 22 (A) >60 mL/min/1.73 m^2   CBC with Automated Differential    Collection Time: 01/15/25  5:00 AM   Result Value Ref Range    WBC 2.54 (L) 3.90 - 12.70 K/uL    RBC 2.93 (L) 4.60 - 6.20 M/uL    Hemoglobin 7.2 (L) 14.0 - 18.0 g/dL    Hematocrit 25.0 (L) 40.0 - 54.0 %    MCV 85 82 - 98 fL    MCH 24.6 (L) 27.0 - 31.0 pg    MCHC 28.8 (L) 32.0 - 36.0 g/dL    RDW 13.8 11.5 - 14.5 %    Platelets 199 150 - 450 K/uL    MPV 10.3 9.2 - 12.9 fL    Immature Granulocytes CANCELED 0.0 - 0.5 %    Immature Grans (Abs) CANCELED 0.00 - 0.04 K/uL    nRBC 0 0 /100 WBC    Gran % 59.0 38.0 - 73.0 %    Lymph % 22.0 18.0 - 48.0 %    Mono % 12.0 4.0 - 15.0 %    Eosinophil % 2.0 0.0 - 8.0 %    Basophil % 0.0 0.0 - 1.9 %    Bands 5.0 %    Platelet Estimate Appears normal     Poik Slight     Ovalocytes Occasional     Target Cells Occasional     Tear Drop Cells Occasional     Differential Method Manual    Renal Function Panel    Collection Time: 01/15/25  5:00 AM   Result Value Ref Range    Glucose 198 (H) 70 - 110 mg/dL    Sodium 142 136 - 145 mmol/L    Potassium 4.3 3.5 - 5.1 mmol/L    Chloride 116 (H) 95 - 110 mmol/L    CO2 16 (L) 23 - 29 mmol/L    BUN 64 (H) 8 - 23 mg/dL    Calcium 7.4 (L) 8.7 - 10.5 mg/dL    Creatinine 3.0 (H) 0.5 - 1.4 mg/dL    Albumin 1.2 (L) 3.5 - 5.2 g/dL    Phosphorus 3.6 2.7 - 4.5 mg/dL    eGFR 22 (A) >60 mL/min/1.73 m^2    Anion Gap 10 8 - 16 mmol/L   Lactic Acid, Plasma    Collection Time: 01/15/25  5:22 AM   Result Value Ref Range    Lactate (Lactic Acid) 0.6 0.5 - 2.2 mmol/L   POCT glucose    Collection Time: 01/15/25  6:31 AM   Result Value Ref Range    POCT Glucose 241 (H) 70 - 110 mg/dL   Lactic Acid, Plasma    Collection Time: 01/15/25  8:49 AM   Result Value Ref Range    Lactate (Lactic Acid) 0.7 0.5 - 2.2 mmol/L       LABS:  Reviewed Yes      Intake/Output Summary (Last 24 hours) at 1/15/2025  1113  Last data filed at 1/15/2025 0556  Gross per 24 hour   Intake --   Output 950 ml   Net -950 ml       Assessment and Plan     ANGELITO on CKD stage 4 secondary to possible IV VD complicated by Prograf toxicity. His creatinine is improving.  Continue IV fluids.  His Prograf level is currently on hold.   His kidney transplant ultrasound did not reveal any obstruction but revealed increase in the main renal artery velocities.      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  His Prograf level on hold and continue CellCept for now.  We will repeat Prograf level in a.m. and once it is in the therapeutic range decreased the Prograf dose to 4 mg b.I.d..     Hematuria secondary to possible Ann trauma.  His anticoagulants on hold.    Improving.     Lower extremity edema with skin changes consistent with venous stasis     Hyperkalemia.  His Entresto and the potassium supplements on hold.   Resolved.  I will discontinue Lokelma.      Melena.   Resolved.  GI is following.     Hypertension with renal disease.  His blood pressure is stable.     Diabetes with renal disease     CAD/CHF with diminished ejection fraction followed by Dr. Man of Cardiology.  His EF was 35% in 11/2024.  Watch closely for any fluid overload with gentle IV fluids.                 ________________________________________________  Yoandy Bennett

## 2025-01-15 NOTE — PLAN OF CARE
Problem: Adult Inpatient Plan of Care  Goal: Plan of Care Review  Outcome: Progressing  Goal: Patient-Specific Goal (Individualized)  Outcome: Progressing  Goal: Absence of Hospital-Acquired Illness or Injury  Outcome: Progressing  Goal: Optimal Comfort and Wellbeing  Outcome: Progressing  Goal: Readiness for Transition of Care  Outcome: Progressing     Problem: Bariatric Environmental Safety  Goal: Safety Maintained with Care  Outcome: Progressing     Problem: Diabetes Comorbidity  Goal: Blood Glucose Level Within Targeted Range  Outcome: Progressing     Problem: Wound  Goal: Optimal Coping  Outcome: Progressing  Goal: Optimal Functional Ability  Outcome: Progressing  Goal: Absence of Infection Signs and Symptoms  Outcome: Progressing  Goal: Improved Oral Intake  Outcome: Progressing  Goal: Optimal Pain Control and Function  Outcome: Progressing  Goal: Skin Health and Integrity  Outcome: Progressing  Goal: Optimal Wound Healing  Outcome: Progressing     Problem: Skin Injury Risk Increased  Goal: Skin Health and Integrity  Outcome: Progressing   Pt stable overnight. Low grade fever. Turned q2h.

## 2025-01-15 NOTE — ASSESSMENT & PLAN NOTE
Creatine stable for now. BMP reviewed- noted Estimated Creatinine Clearance: 20.3 mL/min (A) (based on SCr of 4.3 mg/dL (H)). according to latest data. Based on current GFR, CKD stage is stage 4 - GFR 15-29.  Monitor UOP and serial BMP and adjust therapy as needed. Renally dose meds. Avoid nephrotoxic medications and procedures.  Pt is d/p Renal Transplant in 2015, on Prograf and Cellcept    Edwina on CKD 4- sec to Prograf toxicity- Bleeding- started on IVF    Cont IVF

## 2025-01-15 NOTE — PROGRESS NOTES
[facility] Medicine  Progress Note    Patient Name: Mitch Whittaker  MRN: [id number]  Patient Class: IP- Inpatient   Admission Date: 1/12/2025  Length of Stay: 3 days  Attending Physician: Jaymie Medley MD  Primary Care Provider: Valery Caal MD        Subjective     Principal Problem:Hyperkalemia        HPI:  Mitch Whittaker is a 69 y.o. male patient with a PMHx of CHF- EF 40%, anemia, hyperparathyroidism, type 2 DM, s/p kidney transplant 2015 on Prograf and Cellcept, DVT on Eliquis, MARION, HLD, HTN, CKD, Hep C, and arthritis who presents to the Emergency Department for evaluation of nausea vomiting and diarrhea over the past several days (both of which have improved), but felt weak and couldn't move around like normal. no abd pain, no systemic symptoms, stool now formed. Pt is a very poor historian. According to nursing staff, family reported that the pt has been sitting in his chair for the last 3 days. Pt reports that he has had two episodes of diarrhea since yesterday, but due to the increasing weakness in his knees he was unable to get up from his chair. Pt normally ambulates with assistance from a walker. Symptoms are constant and moderate in severity. Associated sxs include blood in stool (x4 days) and n/v yesterday, but none today after PO. Patient denies any fever, abdominal pain, appetite changes, SOB, dysuria, and all other sxs at this time. No prior tx. Pt is on Eliquis. No further complaints or concerns at this time.   In the ER, VS /65, , Sats 100% on RA, Afeb, WBC 3.7, H/H 9.5/33, Bun/Cr 82/4.3, K 6.7- treated aggressively in the ER and rechecked and repeat 5.4. He is heme positive as well. C Diff Neg. She is being admitted for possible Hyperkalemia, acute GI Bleed, ANGELITO.     Overview/Hospital Course:  69 y.o. male patient with a PMHx of CHF- EF 40%, anemia, hyperparathyroidism, type 2 DM, s/p kidney transplant 2015 on Prograf and Cellcept, DVT on  Eliquis, MARION, HLD, HTN, CKD, Hep C, and arthritis who presents to the Emergency Department for evaluation of nausea vomiting and diarrhea over the past several days (both of which have improved), but felt weak and couldn't move around like normal. no abd pain, no systemic symptoms, stool now formed. Pt is a very poor historian. According to nursing staff, family reported that the pt has been sitting in his chair for the last 3 days. Pt reports that he has had two episodes of diarrhea since yesterday, but due to the increasing weakness in his knees he was unable to get up from his chair. Pt normally ambulates with assistance from a walker. Symptoms are constant and moderate in severity. Associated sxs include blood in stool (x4 days) and n/v yesterday, but none today after PO. Patient denies any fever, abdominal pain, appetite changes, SOB, dysuria, and all other sxs at this time. No prior tx. Pt is on Eliquis. No further complaints or concerns at this time.   In the ER, VS /65, , Sats 100% on RA, Afeb, WBC 3.7, H/H 9.5/33, Bun/Cr 82/4.3, K 6.7- treated aggressively in the ER and rechecked and repeat 5.4. He is heme positive as well. C Diff Neg. She is being admitted for possible Hyperkalemia, acute GI Bleed, ANGELITO.     1/13- Appreciate Renal and GI input- pt looks and feels a little better, stronger, more alert and responsive. Wife and daughter are here. His prograf level very high- 32- hence Held. It seems that he was getting Prograf toxicity at home which then resulted in Hematuria, ABL Anemia, ANGELITO and Hyperkalemia. Pt also felt weak and low sugars, so drank a lot of OJ which further raised his K level. Does not appears to have true GI bleed. But has hematuria- hence Purewick catheter placed and Dr. Bennett also started him on IVF- hematuria appears to be clearing. Pt feeling better, will check labs in am and start PT/OT. K was 6.1 this am- started on lokelma.     1/14- looks a little better, no melena  but still has hematuria. H/h stable, 8.3/28, K still 6, Bun.Cr still high 83/4. Repeat Prograf level pending. VSS Afeb, he is more alert and talking. Wound care wrapped his legs with moderate compression. Had mod R SFA stenosis, vascular evaluated him and will continue to monitor him, no surgery or angioplasty needed at this time. Will start PT/OT in am. Cont IVF, If Hematuria persists, start CBI in am.     Interval History: looks a little better, no melena but still has hematuria. H/h stable, 8.3/28, K still 6, Bun.Cr still high 83/4. Repeat Prograf level pending. VSS Afeb, he is more alert and talking. Wound care wrapped his legs with moderate compression. Had mod R SFA stenosis, vascular evaluated him and will continue to monitor him, no surgery or angioplasty needed at this time. Will start PT/OT in am. Cont IVF, If Hematuria persists, start CBI in am.     Review of Systems   Constitutional:  Positive for activity change and appetite change. Negative for fever.   HENT:  Negative for hearing loss.    Eyes:  Negative for visual disturbance.   Respiratory:  Negative for cough and shortness of breath.    Cardiovascular:  Positive for chest pain (mild nagging pain in midline) and leg swelling. Negative for palpitations.   Gastrointestinal:         As per HPI.   Genitourinary:  Negative for difficulty urinating, dysuria, frequency and hematuria.   Musculoskeletal:  Negative for arthralgias and back pain.   Skin:  Positive for pallor and wound. Negative for color change.   Neurological:  Positive for weakness. Negative for seizures, syncope, numbness and headaches.   Hematological:  Does not bruise/bleed easily.   Psychiatric/Behavioral:  The patient is not nervous/anxious.    Objective:     Vital Signs (Most Recent):  Temp: 97.3 °F (36.3 °C) (01/14/25 1940)  Pulse: 109 (01/14/25 1940)  Resp: 20 (01/14/25 1940)  BP: 136/73 (01/14/25 1940)  SpO2: (!) 93 % (01/14/25 1940) Vital Signs (24h Range):  Temp:  [97.3 °F (36.3  °C)-99.3 °F (37.4 °C)] 97.3 °F (36.3 °C)  Pulse:  [109-132] 109  Resp:  [17-22] 20  SpO2:  [92 %-96 %] 93 %  BP: (117-140)/(60-73) 136/73     Weight: 128.7 kg (283 lb 11.7 oz)  Body mass index is 44.44 kg/m².    Intake/Output Summary (Last 24 hours) at 1/15/2025 0025  Last data filed at 1/14/2025 2339  Gross per 24 hour   Intake --   Output 1350 ml   Net -1350 ml         Physical Exam  Constitutional:       General: He is not in acute distress.     Appearance: Normal appearance. He is well-developed. He is obese. He is ill-appearing. He is not toxic-appearing or diaphoretic.   HENT:      Head: Normocephalic and atraumatic.   Eyes:      Extraocular Movements: Extraocular movements intact.   Cardiovascular:      Rate and Rhythm: Regular rhythm. Tachycardia present.      Heart sounds: Normal heart sounds. No murmur heard.  Pulmonary:      Effort: Pulmonary effort is normal. No respiratory distress.      Breath sounds: Normal breath sounds. No wheezing.   Abdominal:      General: Bowel sounds are normal. There is no distension.      Palpations: Abdomen is soft. There is no mass.      Tenderness: There is no abdominal tenderness.   Musculoskeletal:         General: Swelling present.      Cervical back: Normal range of motion and neck supple.      Right lower leg: Edema present.      Left lower leg: Edema present.   Skin:     General: Skin is warm and dry.      Capillary Refill: Capillary refill takes 2 to 3 seconds.   Neurological:      General: No focal deficit present.      Mental Status: He is alert and oriented to person, place, and time.      Cranial Nerves: No cranial nerve deficit.   Psychiatric:         Mood and Affect: Mood normal.         Behavior: Behavior normal.         Thought Content: Thought content normal.         Judgment: Judgment normal.         Significant Labs: All pertinent labs within the past 24 hours have been reviewed.  BMP:   Recent Labs   Lab 01/14/25  0516   *      K 6.0*   CL  107   CO2 19*   BUN 81*   CREATININE 4.3*   CALCIUM 8.9     CBC:   Recent Labs   Lab 01/13/25  0056 01/13/25 0448 01/14/25  0516   WBC 2.84* 2.96* 3.01*   HGB 8.7* 8.7* 8.3*   HCT 29.3* 29.0* 28.3*    220 231       Significant Imaging: I have reviewed all pertinent imaging results/findings within the past 24 hours.    Assessment and Plan     * Hyperkalemia  Hyperkalemia is likely due to Increased potassium intake.The patients most recent potassium results are listed below.  Recent Labs     01/13/25  0448 01/13/25  2258 01/14/25  0516   K 6.1*  6.1* 5.9* 6.0*       Plan  - Monitor for arrhythmias with EKG and/or continuous telemetry.   - Treat the hyperkalemia with Potassium Binders, Calcium gluconate, IV insulin and dextrose, Furosemide, and Sodium Bicarbonate.   - Monitor potassium: Every 12 hours  - The patient's hyperkalemia is worsening. Will continue current treatment    Pt was reportedly drinking lots of OJ ast home for last 3 days  Nephrology on board    Sec to ANGELITO on CKD 4 plus excess dietary Intake- better but still high- started on Lokelma and hold K supplements    K still a little high- likely sec to Prograf toxicity  cont Lokelma, iVF      ABL Anemia plus anemia of CKD 4  Anemia is likely due to acute blood loss which was from CKD, s/p Renal transplant and chronic disease due to Chronic Kidney Disease. Most recent hemoglobin and hematocrit are listed below.  Recent Labs     01/13/25  0056 01/13/25 0448 01/14/25  0516   HGB 8.7* 8.7* 8.3*   HCT 29.3* 29.0* 28.3*       Plan  - Monitor serial CBC: Every 12 hours  - Transfuse PRBC if patient becomes hemodynamically unstable, symptomatic or H/H drops below 7/21.  - Patient has not received any PRBC transfusions to date  - Patient's anemia is currently stable  -   Likely sec to Hematuria/Possible GI Bleed from Eliquis/ASA sec to Prograf toxicity  H/h dropped from 11 to 9 to 8.7 now. Pt also getting IVF now- will know pilar value in am    Stable  H/H    Melena  +ve melena reported at home and Stool Heme +ve here with a drop in H/H here from 11 to 9.5 and then to 8.6. no hematemesis. Pt was on Eliquis and ASA- on hold  GI Consulted  Protonix    No obvious melena  GI on board  Possible Prograf induced Eliquis toxicity      Gross hematuria  Stable, has Purewick catheter now  Getting IVF, if gets worse, may need CBI    Still persists but a little better  If no improvement tomorrow- will try CBI      Tacrolimus-induced GI toxicity  Prograf level very high  Prograf on hold    Await repeat Prograf level      CKD (chronic kidney disease) stage 4, GFR 15-29 ml/min  Creatine stable for now. BMP reviewed- noted Estimated Creatinine Clearance: 20.3 mL/min (A) (based on SCr of 4.3 mg/dL (H)). according to latest data. Based on current GFR, CKD stage is stage 4 - GFR 15-29.  Monitor UOP and serial BMP and adjust therapy as needed. Renally dose meds. Avoid nephrotoxic medications and procedures.  Pt is d/p Renal Transplant in 2015, on Prograf and Cellcept    Edwina on CKD 4- sec to Prograf toxicity- Bleeding- started on IVF    Cont IVF      VTE Risk Mitigation (From admission, onward)           Ordered     Reason for No Pharmacological VTE Prophylaxis  Once        Question:  Reasons:  Answer:  Active Bleeding    01/12/25 1729     IP VTE HIGH RISK PATIENT  Once         01/12/25 1729     Place sequential compression device  Until discontinued         01/12/25 1729                    Discharge Planning   GRETEL:      Code Status: Full Code   Medical Readiness for Discharge Date:   Discharge Plan A: Home with family, Home          Jaymie Medley MD  Department of Hospital Medicine   [facility] - Telemetry ([facility])

## 2025-01-15 NOTE — ASSESSMENT & PLAN NOTE
Hyperkalemia is likely due to Increased potassium intake.The patients most recent potassium results are listed below.  Recent Labs     01/13/25  0448 01/13/25  2258 01/14/25  0516   K 6.1*  6.1* 5.9* 6.0*       Plan  - Monitor for arrhythmias with EKG and/or continuous telemetry.   - Treat the hyperkalemia with Potassium Binders, Calcium gluconate, IV insulin and dextrose, Furosemide, and Sodium Bicarbonate.   - Monitor potassium: Every 12 hours  - The patient's hyperkalemia is worsening. Will continue current treatment    Pt was reportedly drinking lots of OJ ast home for last 3 days  Nephrology on board    Sec to ANGELITO on CKD 4 plus excess dietary Intake- better but still high- started on Lokelma and hold K supplements    K still a little high- likely sec to Prograf toxicity  cont Lokelma, iVF

## 2025-01-15 NOTE — ASSESSMENT & PLAN NOTE
Anemia is likely due to acute blood loss which was from CKD, s/p Renal transplant and chronic disease due to Chronic Kidney Disease. Most recent hemoglobin and hematocrit are listed below.  Recent Labs     01/13/25  0056 01/13/25  0448 01/14/25  0516   HGB 8.7* 8.7* 8.3*   HCT 29.3* 29.0* 28.3*       Plan  - Monitor serial CBC: Every 12 hours  - Transfuse PRBC if patient becomes hemodynamically unstable, symptomatic or H/H drops below 7/21.  - Patient has not received any PRBC transfusions to date  - Patient's anemia is currently stable  -   Likely sec to Hematuria/Possible GI Bleed from Eliquis/ASA sec to Prograf toxicity  H/h dropped from 11 to 9 to 8.7 now. Pt also getting IVF now- will know pilar value in am    Stable H/H

## 2025-01-15 NOTE — SUBJECTIVE & OBJECTIVE
Interval History: looks a little better, no melena but still has hematuria. H/h stable, 8.3/28, K still 6, Bun.Cr still high 83/4. Repeat Prograf level pending. VSS Afeb, he is more alert and talking. Wound care wrapped his legs with moderate compression. Had mod R SFA stenosis, vascular evaluated him and will continue to monitor him, no surgery or angioplasty needed at this time. Will start PT/OT in am. Cont IVF, If Hematuria persists, start CBI in am.     Review of Systems   Constitutional:  Positive for activity change and appetite change. Negative for fever.   HENT:  Negative for hearing loss.    Eyes:  Negative for visual disturbance.   Respiratory:  Negative for cough and shortness of breath.    Cardiovascular:  Positive for chest pain (mild nagging pain in midline) and leg swelling. Negative for palpitations.   Gastrointestinal:         As per HPI.   Genitourinary:  Negative for difficulty urinating, dysuria, frequency and hematuria.   Musculoskeletal:  Negative for arthralgias and back pain.   Skin:  Positive for pallor and wound. Negative for color change.   Neurological:  Positive for weakness. Negative for seizures, syncope, numbness and headaches.   Hematological:  Does not bruise/bleed easily.   Psychiatric/Behavioral:  The patient is not nervous/anxious.    Objective:     Vital Signs (Most Recent):  Temp: 97.3 °F (36.3 °C) (01/14/25 1940)  Pulse: 109 (01/14/25 1940)  Resp: 20 (01/14/25 1940)  BP: 136/73 (01/14/25 1940)  SpO2: (!) 93 % (01/14/25 1940) Vital Signs (24h Range):  Temp:  [97.3 °F (36.3 °C)-99.3 °F (37.4 °C)] 97.3 °F (36.3 °C)  Pulse:  [109-132] 109  Resp:  [17-22] 20  SpO2:  [92 %-96 %] 93 %  BP: (117-140)/(60-73) 136/73     Weight: 128.7 kg (283 lb 11.7 oz)  Body mass index is 44.44 kg/m².    Intake/Output Summary (Last 24 hours) at 1/15/2025 0025  Last data filed at 1/14/2025 2339  Gross per 24 hour   Intake --   Output 1350 ml   Net -1350 ml         Physical Exam  Constitutional:        General: He is not in acute distress.     Appearance: Normal appearance. He is well-developed. He is obese. He is ill-appearing. He is not toxic-appearing or diaphoretic.   HENT:      Head: Normocephalic and atraumatic.   Eyes:      Extraocular Movements: Extraocular movements intact.   Cardiovascular:      Rate and Rhythm: Regular rhythm. Tachycardia present.      Heart sounds: Normal heart sounds. No murmur heard.  Pulmonary:      Effort: Pulmonary effort is normal. No respiratory distress.      Breath sounds: Normal breath sounds. No wheezing.   Abdominal:      General: Bowel sounds are normal. There is no distension.      Palpations: Abdomen is soft. There is no mass.      Tenderness: There is no abdominal tenderness.   Musculoskeletal:         General: Swelling present.      Cervical back: Normal range of motion and neck supple.      Right lower leg: Edema present.      Left lower leg: Edema present.   Skin:     General: Skin is warm and dry.      Capillary Refill: Capillary refill takes 2 to 3 seconds.   Neurological:      General: No focal deficit present.      Mental Status: He is alert and oriented to person, place, and time.      Cranial Nerves: No cranial nerve deficit.   Psychiatric:         Mood and Affect: Mood normal.         Behavior: Behavior normal.         Thought Content: Thought content normal.         Judgment: Judgment normal.         Significant Labs: All pertinent labs within the past 24 hours have been reviewed.  BMP:   Recent Labs   Lab 01/14/25  0516   *      K 6.0*      CO2 19*   BUN 81*   CREATININE 4.3*   CALCIUM 8.9     CBC:   Recent Labs   Lab 01/13/25  0056 01/13/25  0448 01/14/25  0516   WBC 2.84* 2.96* 3.01*   HGB 8.7* 8.7* 8.3*   HCT 29.3* 29.0* 28.3*    220 231       Significant Imaging: I have reviewed all pertinent imaging results/findings within the past 24 hours.

## 2025-01-15 NOTE — ASSESSMENT & PLAN NOTE
Stable, has Purewick catheter now  Getting IVF, if gets worse, may need CBI    Still persists but a little better  If no improvement tomorrow- will try CBI

## 2025-01-16 PROBLEM — K92.1 MELENA: Status: RESOLVED | Noted: 2025-01-13 | Resolved: 2025-01-16

## 2025-01-16 LAB
ABO + RH BLD: NORMAL
ALBUMIN SERPL BCP-MCNC: 1.4 G/DL (ref 3.5–5.2)
ANION GAP SERPL CALC-SCNC: 9 MMOL/L (ref 8–16)
BASOPHILS # BLD AUTO: ABNORMAL K/UL (ref 0–0.2)
BASOPHILS NFR BLD: 0 % (ref 0–1.9)
BLD GP AB SCN CELLS X3 SERPL QL: NORMAL
BUN SERPL-MCNC: 58 MG/DL (ref 8–23)
CALCIUM SERPL-MCNC: 9.2 MG/DL (ref 8.7–10.5)
CHLORIDE SERPL-SCNC: 114 MMOL/L (ref 95–110)
CO2 SERPL-SCNC: 20 MMOL/L (ref 23–29)
CREAT SERPL-MCNC: 2.9 MG/DL (ref 0.5–1.4)
DACRYOCYTES BLD QL SMEAR: ABNORMAL
DIFFERENTIAL METHOD BLD: ABNORMAL
EOSINOPHIL # BLD AUTO: ABNORMAL K/UL (ref 0–0.5)
EOSINOPHIL NFR BLD: 2 % (ref 0–8)
ERYTHROCYTE [DISTWIDTH] IN BLOOD BY AUTOMATED COUNT: 13.9 % (ref 11.5–14.5)
EST. GFR  (NO RACE VARIABLE): 22 ML/MIN/1.73 M^2
GLUCOSE SERPL-MCNC: 181 MG/DL (ref 70–110)
HCT VFR BLD AUTO: 26.5 % (ref 40–54)
HGB BLD-MCNC: 7.7 G/DL (ref 14–18)
IMM GRANULOCYTES # BLD AUTO: ABNORMAL K/UL (ref 0–0.04)
IMM GRANULOCYTES NFR BLD AUTO: ABNORMAL % (ref 0–0.5)
LYMPHOCYTES # BLD AUTO: ABNORMAL K/UL (ref 1–4.8)
LYMPHOCYTES NFR BLD: 12 % (ref 18–48)
MCH RBC QN AUTO: 24.8 PG (ref 27–31)
MCHC RBC AUTO-ENTMCNC: 29.1 G/DL (ref 32–36)
MCV RBC AUTO: 85 FL (ref 82–98)
MONOCYTES # BLD AUTO: ABNORMAL K/UL (ref 0.3–1)
MONOCYTES NFR BLD: 16 % (ref 4–15)
NEUTROPHILS NFR BLD: 69 % (ref 38–73)
NEUTS BAND NFR BLD MANUAL: 1 %
NRBC BLD-RTO: 0 /100 WBC
OVALOCYTES BLD QL SMEAR: ABNORMAL
PHOSPHATE SERPL-MCNC: 4.5 MG/DL (ref 2.7–4.5)
PLATELET # BLD AUTO: 225 K/UL (ref 150–450)
PLATELET BLD QL SMEAR: ABNORMAL
PMV BLD AUTO: 9.9 FL (ref 9.2–12.9)
POCT GLUCOSE: 162 MG/DL (ref 70–110)
POCT GLUCOSE: 186 MG/DL (ref 70–110)
POCT GLUCOSE: 201 MG/DL (ref 70–110)
POCT GLUCOSE: 248 MG/DL (ref 70–110)
POIKILOCYTOSIS BLD QL SMEAR: SLIGHT
POTASSIUM SERPL-SCNC: 5 MMOL/L (ref 3.5–5.1)
RBC # BLD AUTO: 3.11 M/UL (ref 4.6–6.2)
SCHISTOCYTES BLD QL SMEAR: ABNORMAL
SCHISTOCYTES BLD QL SMEAR: PRESENT
SODIUM SERPL-SCNC: 143 MMOL/L (ref 136–145)
SPECIMEN OUTDATE: NORMAL
TACROLIMUS BLD-MCNC: 5.2 NG/ML (ref 5–15)
TROPONIN I SERPL DL<=0.01 NG/ML-MCNC: 0.05 NG/ML (ref 0–0.03)
WBC # BLD AUTO: 2.78 K/UL (ref 3.9–12.7)

## 2025-01-16 PROCEDURE — 63600175 PHARM REV CODE 636 W HCPCS: Performed by: EMERGENCY MEDICINE

## 2025-01-16 PROCEDURE — 36415 COLL VENOUS BLD VENIPUNCTURE: CPT | Performed by: INTERNAL MEDICINE

## 2025-01-16 PROCEDURE — 25000003 PHARM REV CODE 250: Performed by: EMERGENCY MEDICINE

## 2025-01-16 PROCEDURE — 85027 COMPLETE CBC AUTOMATED: CPT | Performed by: INTERNAL MEDICINE

## 2025-01-16 PROCEDURE — 80069 RENAL FUNCTION PANEL: CPT | Performed by: INTERNAL MEDICINE

## 2025-01-16 PROCEDURE — 86850 RBC ANTIBODY SCREEN: CPT | Performed by: HOSPITALIST

## 2025-01-16 PROCEDURE — 93010 ELECTROCARDIOGRAM REPORT: CPT | Mod: ,,, | Performed by: INTERNAL MEDICINE

## 2025-01-16 PROCEDURE — 63600175 PHARM REV CODE 636 W HCPCS: Performed by: HOSPITALIST

## 2025-01-16 PROCEDURE — 21400001 HC TELEMETRY ROOM

## 2025-01-16 PROCEDURE — 99232 SBSQ HOSP IP/OBS MODERATE 35: CPT | Mod: ,,, | Performed by: INTERNAL MEDICINE

## 2025-01-16 PROCEDURE — A4216 STERILE WATER/SALINE, 10 ML: HCPCS | Performed by: EMERGENCY MEDICINE

## 2025-01-16 PROCEDURE — 84484 ASSAY OF TROPONIN QUANT: CPT | Performed by: HOSPITALIST

## 2025-01-16 PROCEDURE — 85007 BL SMEAR W/DIFF WBC COUNT: CPT | Performed by: INTERNAL MEDICINE

## 2025-01-16 PROCEDURE — 25000003 PHARM REV CODE 250: Performed by: PHYSICIAN ASSISTANT

## 2025-01-16 PROCEDURE — 86920 COMPATIBILITY TEST SPIN: CPT | Performed by: HOSPITALIST

## 2025-01-16 PROCEDURE — 93005 ELECTROCARDIOGRAM TRACING: CPT

## 2025-01-16 PROCEDURE — 80197 ASSAY OF TACROLIMUS: CPT | Performed by: INTERNAL MEDICINE

## 2025-01-16 PROCEDURE — 25000003 PHARM REV CODE 250: Performed by: INTERNAL MEDICINE

## 2025-01-16 PROCEDURE — 25000003 PHARM REV CODE 250: Performed by: HOSPITALIST

## 2025-01-16 RX ORDER — TACROLIMUS 1 MG/1
3 CAPSULE ORAL 2 TIMES DAILY
Status: DISCONTINUED | OUTPATIENT
Start: 2025-01-16 | End: 2025-01-29 | Stop reason: HOSPADM

## 2025-01-16 RX ORDER — HYDROCODONE BITARTRATE AND ACETAMINOPHEN 500; 5 MG/1; MG/1
TABLET ORAL
Status: DISCONTINUED | OUTPATIENT
Start: 2025-01-16 | End: 2025-01-29 | Stop reason: HOSPADM

## 2025-01-16 RX ORDER — FUROSEMIDE 10 MG/ML
40 INJECTION INTRAMUSCULAR; INTRAVENOUS ONCE
Status: COMPLETED | OUTPATIENT
Start: 2025-01-16 | End: 2025-01-16

## 2025-01-16 RX ORDER — METOPROLOL TARTRATE 1 MG/ML
5 INJECTION, SOLUTION INTRAVENOUS ONCE
Status: COMPLETED | OUTPATIENT
Start: 2025-01-16 | End: 2025-01-16

## 2025-01-16 RX ORDER — CYANOCOBALAMIN 1000 UG/ML
1000 INJECTION, SOLUTION INTRAMUSCULAR; SUBCUTANEOUS DAILY
Status: COMPLETED | OUTPATIENT
Start: 2025-01-16 | End: 2025-01-17

## 2025-01-16 RX ORDER — METOPROLOL SUCCINATE 25 MG/1
25 TABLET, EXTENDED RELEASE ORAL DAILY
Status: DISCONTINUED | OUTPATIENT
Start: 2025-01-16 | End: 2025-01-28

## 2025-01-16 RX ADMIN — SODIUM CHLORIDE: 9 INJECTION, SOLUTION INTRAVENOUS at 02:01

## 2025-01-16 RX ADMIN — Medication 10 ML: at 10:01

## 2025-01-16 RX ADMIN — INSULIN GLARGINE 10 UNITS: 100 INJECTION, SOLUTION SUBCUTANEOUS at 09:01

## 2025-01-16 RX ADMIN — INSULIN ASPART 1 UNITS: 100 INJECTION, SOLUTION INTRAVENOUS; SUBCUTANEOUS at 10:01

## 2025-01-16 RX ADMIN — INSULIN GLARGINE 10 UNITS: 100 INJECTION, SOLUTION SUBCUTANEOUS at 10:01

## 2025-01-16 RX ADMIN — FUROSEMIDE 40 MG: 10 INJECTION, SOLUTION INTRAMUSCULAR; INTRAVENOUS at 10:01

## 2025-01-16 RX ADMIN — TACROLIMUS 3 MG: 1 CAPSULE ORAL at 06:01

## 2025-01-16 RX ADMIN — METOPROLOL SUCCINATE 25 MG: 25 TABLET, FILM COATED, EXTENDED RELEASE ORAL at 10:01

## 2025-01-16 RX ADMIN — ACETAMINOPHEN 650 MG: 325 TABLET ORAL at 12:01

## 2025-01-16 RX ADMIN — Medication 10 ML: at 02:01

## 2025-01-16 RX ADMIN — CYANOCOBALAMIN 1000 MCG: 1000 INJECTION INTRAMUSCULAR; SUBCUTANEOUS at 06:01

## 2025-01-16 RX ADMIN — SODIUM ZIRCONIUM CYCLOSILICATE 10 G: 5 POWDER, FOR SUSPENSION ORAL at 09:01

## 2025-01-16 RX ADMIN — METOROPROLOL TARTRATE 5 MG: 5 INJECTION, SOLUTION INTRAVENOUS at 10:01

## 2025-01-16 RX ADMIN — PANTOPRAZOLE SODIUM 40 MG: 40 TABLET, DELAYED RELEASE ORAL at 10:01

## 2025-01-16 RX ADMIN — MYCOPHENOLATE MOFETIL 500 MG: 500 TABLET, FILM COATED ORAL at 10:01

## 2025-01-16 RX ADMIN — MYCOPHENOLATE MOFETIL 500 MG: 500 TABLET, FILM COATED ORAL at 09:01

## 2025-01-16 RX ADMIN — HYDROCODONE BITARTRATE AND ACETAMINOPHEN 1 TABLET: 5; 325 TABLET ORAL at 05:01

## 2025-01-16 RX ADMIN — INSULIN ASPART 4 UNITS: 100 INJECTION, SOLUTION INTRAVENOUS; SUBCUTANEOUS at 05:01

## 2025-01-16 RX ADMIN — INSULIN ASPART 2 UNITS: 100 INJECTION, SOLUTION INTRAVENOUS; SUBCUTANEOUS at 06:01

## 2025-01-16 RX ADMIN — PANTOPRAZOLE SODIUM 40 MG: 40 TABLET, DELAYED RELEASE ORAL at 09:01

## 2025-01-16 RX ADMIN — INSULIN ASPART 4 UNITS: 100 INJECTION, SOLUTION INTRAVENOUS; SUBCUTANEOUS at 11:01

## 2025-01-16 NOTE — ASSESSMENT & PLAN NOTE
+ve melena reported at home and Stool Heme +ve here with a drop in H/H here from 11 to 9.5 and then to 8.6. no hematemesis. Pt was on Eliquis and ASA- on hold  GI Consulted  Protonix    No obvious melena  GI on board  Possible Prograf induced Eliquis toxicity    resolved

## 2025-01-16 NOTE — PROGRESS NOTES
Nephrology Progress Note:    HPI:   Mr. Whittaker is a 69 year  male with a hx of previous ESRD likely related to diabetes and hypertension that was on dialysis several years prior to receiving a living related kidney transplant from his daughter in 2015.  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  He is followed by Dr. Gonsalez of [facility] seen last on 09/24/2024 at which time his serum creatinine was 2.2.    He was seen in the emergency department on 01/06/2025 complaining of increasing lower extremity edema.  At that time his serum creatinine was 2.8.  He returns to the emergency department 01/12/2025 with persistent lower extremity edema and associated blistering.  He also complains of bilateral knee pain to the point where he is unable to ambulate.  He attributes this to the change in weather.  His admission laboratory reveals a serum creatinine now up to 4.3 with a potassium of 5.4.  Nephrology has been asked to see and evaluate the patient.    As an outpatient he is chronically on Lasix 40 mg twice a day.  He denies the use of nonsteroidal anti-inflammatory drugs.  He denies dysuria hematuria or difficulty voiding.  He is chronically on Entresto.    Interval History:   Chart reviewed/patient examined.  Uneventful night with no new issues.  He denies nausea, vomiting, or shortness of breath.  He is tolerating his diet.  No family present.    Health Status   Allergies:    is allergic to lisinopril, actos  [pioglitazone], and metformin.    Current medications:     Current Facility-Administered Medications:     0.9% NaCl infusion, , Intravenous, Continuous, Yoandy Bennett MD, Last Rate: 85 mL/hr at 01/16/25 0600, Rate Verify at 01/16/25 0600     acetaminophen tablet 650 mg, 650 mg, Oral, Q4H PRN, Jaymie Medley MD, 650 mg at 01/16/25 0019    dextrose 50% injection 12.5 g, 12.5 g, Intravenous, PRN, Jaymie Medley MD    dextrose 50% injection 25 g, 25 g, Intravenous, PRN, Jaymie Medley MD    glucagon (human recombinant) injection 1 mg, 1 mg, Intramuscular, PRN, Jaymie Medley MD    glucose chewable tablet 16 g, 16 g, Oral, PRN, Jaymie Medley MD    glucose chewable tablet 24 g, 24 g, Oral, PRN, Jaymie Medley MD    HYDROcodone-acetaminophen 5-325 mg per tablet 1 tablet, 1 tablet, Oral, Q6H PRN, Jaymie Medley MD, 1 tablet at 01/15/25 1433    insulin aspart U-100 pen 0-10 Units, 0-10 Units, Subcutaneous, QID (AC + HS) PRN, Jaymie Medley MD, 4 Units at 01/16/25 1126    insulin glargine U-100 (Lantus) pen 10 Units, 10 Units, Subcutaneous, BID, Jaymie Medley MD, 10 Units at 01/16/25 0936    melatonin tablet 6 mg, 6 mg, Oral, Nightly PRN, Jaymie Medley MD    mycophenolate tablet 500 mg, 500 mg, Oral, BID, Jaymie Medley MD, 500 mg at 01/16/25 0936    naloxone 0.4 mg/mL injection 0.02 mg, 0.02 mg, Intravenous, PRN, Jaymie Medley MD    ondansetron disintegrating tablet 8 mg, 8 mg, Oral, Q8H PRN, Jaymie Medley MD    ondansetron injection 4 mg, 4 mg, Intravenous, Q8H PRN, Jaymie Medley MD    pantoprazole EC tablet 40 mg, 40 mg, Oral, BID, Hilario Dahl PA-C, 40 mg at 01/16/25 0936    polyethylene glycol packet 17 g, 17 g, Oral, Daily PRN, Galindo, Majid A., MD    senna-docusate 8.6-50 mg per tablet 2 tablet, 2 tablet, Oral, Daily PRN, Jaymie Medley MD    sodium chloride 0.9% flush 10 mL, 10 mL, Intravenous, Q8H, Jaymie Medley MD, 10 mL at 01/15/25 1435    sodium zirconium cyclosilicate packet 10 g, 10 g, Oral, Daily, Yoandy Bennett MD, 10 g at 01/16/25 0936         Objective       Physical Examination:   VS/Measurements   /68 (BP Location: Left arm, Patient Position: Lying)   Pulse (!) 111   Temp 98 °F (36.7  "°C) (Oral)   Resp 20   Ht 5' 7" (1.702 m)   Wt 130.2 kg (287 lb 0.6 oz)   SpO2 (!) 93%   BMI 44.96 kg/m²   Vitals:    01/16/25 1140   BP: 119/68   Pulse: (!) 111   Resp: 20   Temp: 98 °F (36.7 °C)     UOP = 1700 cc (last 24 hours)  General:   Obese, not in any distress.  Neck:  Supple, midline trachea  Respiratory: Non-labored,  Lungs are clear to auscultation.    Cardiovascular:  Normal rate, Regular rhythm.  Tachycardic   Abdomen:  Soft, Non-tender, Normal bowel sounds.  Allograft - nontender, no bruit  Muskuloskeletal:   pedal edema +  Vitals reviewed.    Laboratory Results   Today's Lab Results :    Recent Results (from the past 24 hours)   POCT glucose    Collection Time: 01/15/25  5:09 PM   Result Value Ref Range    POCT Glucose 249 (H) 70 - 110 mg/dL   POCT glucose    Collection Time: 01/15/25  8:48 PM   Result Value Ref Range    POCT Glucose 217 (H) 70 - 110 mg/dL   CBC Auto Differential    Collection Time: 01/16/25  4:45 AM   Result Value Ref Range    WBC 2.78 (L) 3.90 - 12.70 K/uL    RBC 3.11 (L) 4.60 - 6.20 M/uL    Hemoglobin 7.7 (L) 14.0 - 18.0 g/dL    Hematocrit 26.5 (L) 40.0 - 54.0 %    MCV 85 82 - 98 fL    MCH 24.8 (L) 27.0 - 31.0 pg    MCHC 29.1 (L) 32.0 - 36.0 g/dL    RDW 13.9 11.5 - 14.5 %    Platelets 225 150 - 450 K/uL    MPV 9.9 9.2 - 12.9 fL    Immature Granulocytes CANCELED 0.0 - 0.5 %    Immature Grans (Abs) CANCELED 0.00 - 0.04 K/uL    Lymph # CANCELED 1.0 - 4.8 K/uL    Mono # CANCELED 0.3 - 1.0 K/uL    Eos # CANCELED 0.0 - 0.5 K/uL    Baso # CANCELED 0.00 - 0.20 K/uL    nRBC 0 0 /100 WBC    Gran % 69.0 38.0 - 73.0 %    Lymph % 12.0 (L) 18.0 - 48.0 %    Mono % 16.0 (H) 4.0 - 15.0 %    Eosinophil % 2.0 0.0 - 8.0 %    Basophil % 0.0 0.0 - 1.9 %    Bands 1.0 %    Platelet Estimate Appears normal     Poik Slight     Ovalocytes Occasional     Tear Drop Cells Occasional     Schistocytes Present     Fragmented Cells Occasional     Differential Method Manual    Renal Function Panel    " Collection Time: 01/16/25  4:45 AM   Result Value Ref Range    Glucose 181 (H) 70 - 110 mg/dL    Sodium 143 136 - 145 mmol/L    Potassium 5.0 3.5 - 5.1 mmol/L    Chloride 114 (H) 95 - 110 mmol/L    CO2 20 (L) 23 - 29 mmol/L    BUN 58 (H) 8 - 23 mg/dL    Calcium 9.2 8.7 - 10.5 mg/dL    Creatinine 2.9 (H) 0.5 - 1.4 mg/dL    Albumin 1.4 (L) 3.5 - 5.2 g/dL    Phosphorus 4.5 2.7 - 4.5 mg/dL    eGFR 22 (A) >60 mL/min/1.73 m^2    Anion Gap 9 8 - 16 mmol/L   POCT glucose    Collection Time: 01/16/25  5:52 AM   Result Value Ref Range    POCT Glucose 186 (H) 70 - 110 mg/dL   POCT glucose    Collection Time: 01/16/25 11:26 AM   Result Value Ref Range    POCT Glucose 201 (H) 70 - 110 mg/dL       @[email or web address]@ @[facility]@    Assessment & Plan   Active Hospital Problems    Diagnosis  POA    *Hyperkalemia [E87.5]  Yes    Melena [K92.1]  Yes    Gross hematuria [R31.0]  Yes    Tacrolimus-induced GI toxicity [K63.89, T45.1X5A]  Yes    CKD (chronic kidney disease) stage 4, GFR 15-29 ml/min [N18.4]  Yes    ABL Anemia plus anemia of CKD 4 [N18.9, D63.1]  Yes      Resolved Hospital Problems   No resolved problems to display.       Assessment/Recommendations:      ANGELITO on CKD stage 4 secondary to 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 His creatinine is stable/improving.  Continue IV fluids.  His Prograf level is currently on hold.   His kidney transplant ultrasound did not reveal any obstruction but revealed increase in the main renal artery velocities (we will need follow-up as an outpatient).      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  His Prograf level on hold and continue CellCept for now.       * I called the main [place] laboratory and they reported that the Prograf level we will be ready sometimes this afternoon.  If it is in the therapeutic range, we will resume Prograf at a dose of 4 mg b.i.d.     Hematuria secondary to possible Ann trauma.  His  anticoagulants on hold.    Improving.     Lower extremity edema with skin changes consistent with venous stasis     Hyperkalemia.  His Entresto and the potassium supplements on hold.   Resolved.  I will discontinue Lokelma.      Melena.   Resolved.  GI is following.     Hypertension with renal disease.  His blood pressure is stable.     Diabetes with renal disease     CAD/CHF with diminished ejection fraction followed by Dr. Man of Cardiology.  His EF was 35% in 11/2024.  Watch closely for any fluid overload with gentle IV fluids.           -Portions of this note were created using voice recognition software.  It is possible that there are errors, which have persisted, despite proofreading.  If there is a question regarding contents of this document, please contact me for clarification.

## 2025-01-16 NOTE — PLAN OF CARE
POC reviewed w/ patient. Pt verbalizes understanding of plan  Chart/Orders reviewed  All safety precautions in place; No falls this shift  Pt resting in bed  Glucose level will remain WNL  Problem: Adult Inpatient Plan of Care  Goal: Plan of Care Review  Flowsheets (Taken 1/16/2025 0431)  Plan of Care Reviewed With: patient  Goal: Patient-Specific Goal (Individualized)  Flowsheets (Taken 1/16/2025 0431)  Individualized Care Needs: total care  Anxieties, Fears or Concerns: death  Patient/Family-Specific Goals (Include Timeframe): none     Continuous rounding c bed in lowest position, side rails up, call light in reach  Will continue to monitor until the end of shift

## 2025-01-16 NOTE — ASSESSMENT & PLAN NOTE
Anemia is likely due to acute blood loss which was from CKD, s/p Renal transplant and chronic disease due to Chronic Kidney Disease. Most recent hemoglobin and hematocrit are listed below.  Recent Labs     01/14/25  0516 01/15/25  0500 01/16/25  0445   HGB 8.3* 7.2* 7.7*   HCT 28.3* 25.0* 26.5*       Plan  - Monitor serial CBC: Every 12 hours  - Transfuse PRBC if patient becomes hemodynamically unstable, symptomatic or H/H drops below 7/21.  - Patient has not received any PRBC transfusions to date  - Patient's anemia is currently stable  -   Likely sec to Hematuria/Possible GI Bleed from Eliquis/ASA sec to Prograf toxicity  H/h dropped from 11 to 9 to 8.7 now. Pt also getting IVF now- will know pilar value in am    Stable H/H    H/H improved to 7.7/24 despite IVF

## 2025-01-16 NOTE — PROGRESS NOTES
[facility] Medicine  Progress Note    Patient Name: Mitch Whittaker  MRN: [id number]  Patient Class: IP- Inpatient   Admission Date: 1/12/2025  Length of Stay: 4 days  Attending Physician: Jaymie Medley MD  Primary Care Provider: Valery Caal MD        Subjective     Principal Problem:Hyperkalemia        HPI:  Mitch Whittaker is a 69 y.o. male patient with a PMHx of CHF- EF 40%, anemia, hyperparathyroidism, type 2 DM, s/p kidney transplant 2015 on Prograf and Cellcept, DVT on Eliquis, MARION, HLD, HTN, CKD, Hep C, and arthritis who presents to the Emergency Department for evaluation of nausea vomiting and diarrhea over the past several days (both of which have improved), but felt weak and couldn't move around like normal. no abd pain, no systemic symptoms, stool now formed. Pt is a very poor historian. According to nursing staff, family reported that the pt has been sitting in his chair for the last 3 days. Pt reports that he has had two episodes of diarrhea since yesterday, but due to the increasing weakness in his knees he was unable to get up from his chair. Pt normally ambulates with assistance from a walker. Symptoms are constant and moderate in severity. Associated sxs include blood in stool (x4 days) and n/v yesterday, but none today after PO. Patient denies any fever, abdominal pain, appetite changes, SOB, dysuria, and all other sxs at this time. No prior tx. Pt is on Eliquis. No further complaints or concerns at this time.   In the ER, VS /65, , Sats 100% on RA, Afeb, WBC 3.7, H/H 9.5/33, Bun/Cr 82/4.3, K 6.7- treated aggressively in the ER and rechecked and repeat 5.4. He is heme positive as well. C Diff Neg. She is being admitted for possible Hyperkalemia, acute GI Bleed, ANGELITO.     Overview/Hospital Course:  69 y.o. male patient with a PMHx of CHF- EF 40%, anemia, hyperparathyroidism, type 2 DM, s/p kidney transplant 2015 on Prograf and Cellcept, DVT on  Eliquis, MARION, HLD, HTN, CKD, Hep C, and arthritis who presents to the Emergency Department for evaluation of nausea vomiting and diarrhea over the past several days (both of which have improved), but felt weak and couldn't move around like normal. no abd pain, no systemic symptoms, stool now formed. Pt is a very poor historian. According to nursing staff, family reported that the pt has been sitting in his chair for the last 3 days. Pt reports that he has had two episodes of diarrhea since yesterday, but due to the increasing weakness in his knees he was unable to get up from his chair. Pt normally ambulates with assistance from a walker. Symptoms are constant and moderate in severity. Associated sxs include blood in stool (x4 days) and n/v yesterday, but none today after PO. Patient denies any fever, abdominal pain, appetite changes, SOB, dysuria, and all other sxs at this time. No prior tx. Pt is on Eliquis. No further complaints or concerns at this time.   In the ER, VS /65, , Sats 100% on RA, Afeb, WBC 3.7, H/H 9.5/33, Bun/Cr 82/4.3, K 6.7- treated aggressively in the ER and rechecked and repeat 5.4. He is heme positive as well. C Diff Neg. She is being admitted for possible Hyperkalemia, acute GI Bleed, ANGELITO.     1/13- Appreciate Renal and GI input- pt looks and feels a little better, stronger, more alert and responsive. Wife and daughter are here. His prograf level very high- 32- hence Held. It seems that he was getting Prograf toxicity at home which then resulted in Hematuria, ABL Anemia, ANGELITO and Hyperkalemia. Pt also felt weak and low sugars, so drank a lot of OJ which further raised his K level. Does not appears to have true GI bleed. But has hematuria- hence Purewick catheter placed and Dr. Bennett also started him on IVF- hematuria appears to be clearing. Pt feeling better, will check labs in am and start PT/OT. K was 6.1 this am- started on lokelma.     1/14- looks a little better, no melena  but still has hematuria. H/h stable, 8.3/28, K still 6, Bun.Cr still high 83/4. Repeat Prograf level pending. VSS Afeb, he is more alert and talking. Wound care wrapped his legs with moderate compression. Had mod R SFA stenosis, vascular evaluated him and will continue to monitor him, no surgery or angioplasty needed at this time. Will start PT/OT in am. Cont IVF, If Hematuria persists, start CBI in am.     1/15- looks better but c/o pain in his legs which are in a compression socks. Good response to iVF, Hematuria getting better and Cr down to 3 now with good urine output. H/H dropped to 7.2/22 sec to IVF and Hematuria. Still await repeat prograf level. Getting PT/OT, started on Norco for pain control.      Interval History: looks better but c/o pain in his legs which are in a compression socks. Good response to iVF, Hematuria getting better and Cr down to 3 now with good urine output. H/H dropped to 7.2/22 sec to IVF and Hematuria. Still await repeat prograf level. Getting PT/OT, started on Norco for pain control.      Review of Systems   Constitutional:  Positive for activity change and appetite change. Negative for fever.   HENT:  Negative for hearing loss.    Eyes:  Negative for visual disturbance.   Respiratory:  Negative for cough and shortness of breath.    Cardiovascular:  Positive for chest pain (mild nagging pain in midline) and leg swelling. Negative for palpitations.   Gastrointestinal:         As per HPI.   Genitourinary:  Negative for difficulty urinating, dysuria, frequency and hematuria.   Musculoskeletal:  Negative for arthralgias and back pain.   Skin:  Positive for pallor and wound. Negative for color change.   Neurological:  Positive for weakness. Negative for seizures, syncope, numbness and headaches.   Hematological:  Does not bruise/bleed easily.   Psychiatric/Behavioral:  The patient is not nervous/anxious.      Objective:     Vital Signs (Most Recent):  Temp: 98.7 °F (37.1 °C) (01/16/25  0448)  Pulse: 90 (01/16/25 0448)  Resp: 18 (01/16/25 0448)  BP: 125/60 (01/16/25 0448)  SpO2: (!) 94 % (01/16/25 0448) Vital Signs (24h Range):  Temp:  [98.1 °F (36.7 °C)-101 °F (38.3 °C)] 98.7 °F (37.1 °C)  Pulse:  [] 90  Resp:  [17-20] 18  SpO2:  [93 %-96 %] 94 %  BP: (117-137)/(56-61) 125/60     Weight: 130.2 kg (287 lb 0.6 oz)  Body mass index is 44.96 kg/m².    Intake/Output Summary (Last 24 hours) at 1/16/2025 0636  Last data filed at 1/16/2025 0600  Gross per 24 hour   Intake 5282.54 ml   Output 1700 ml   Net 3582.54 ml         Physical Exam  Constitutional:       General: He is not in acute distress.     Appearance: Normal appearance. He is well-developed. He is obese. He is ill-appearing. He is not toxic-appearing or diaphoretic.   HENT:      Head: Normocephalic and atraumatic.   Eyes:      Extraocular Movements: Extraocular movements intact.   Cardiovascular:      Rate and Rhythm: Regular rhythm. Tachycardia present.      Heart sounds: Normal heart sounds. No murmur heard.  Pulmonary:      Effort: Pulmonary effort is normal. No respiratory distress.      Breath sounds: Normal breath sounds. No wheezing.   Abdominal:      General: Bowel sounds are normal. There is no distension.      Palpations: Abdomen is soft. There is no mass.      Tenderness: There is no abdominal tenderness.   Musculoskeletal:         General: Swelling present.      Cervical back: Normal range of motion and neck supple.      Right lower leg: Edema present.      Left lower leg: Edema present.   Skin:     General: Skin is warm and dry.      Capillary Refill: Capillary refill takes 2 to 3 seconds.   Neurological:      General: No focal deficit present.      Mental Status: He is alert and oriented to person, place, and time.      Cranial Nerves: No cranial nerve deficit.   Psychiatric:         Mood and Affect: Mood normal.         Behavior: Behavior normal.         Thought Content: Thought content normal.         Judgment: Judgment  normal.           Significant Labs: All pertinent labs within the past 24 hours have been reviewed.    Significant Imaging: I have reviewed all pertinent imaging results/findings within the past 24 hours.    Assessment and Plan     * Hyperkalemia  Hyperkalemia is likely due to Increased potassium intake.The patients most recent potassium results are listed below.  Recent Labs     01/14/25  0516 01/15/25  0500 01/16/25  0445   K 6.0* 4.3  4.3 5.0       Plan  - Monitor for arrhythmias with EKG and/or continuous telemetry.   - Treat the hyperkalemia with Potassium Binders, Calcium gluconate, IV insulin and dextrose, Furosemide, and Sodium Bicarbonate.   - Monitor potassium: Every 12 hours  - The patient's hyperkalemia is worsening. Will continue current treatment    Pt was reportedly drinking lots of OJ ast home for last 3 days  Nephrology on board    Sec to ANGELITO on CKD 4 plus excess dietary Intake- better but still high- started on Lokelma and hold K supplements    K still a little high- likely sec to Prograf toxicity  cont Lokelma, iVF    Improved, down to 4.3- cont IVF and Lokelma  Renal following    ABL Anemia plus anemia of CKD 4      Anemia is likely due to acute blood loss which was from CKD, s/p Renal transplant and chronic disease due to Chronic Kidney Disease. Most recent hemoglobin and hematocrit are listed below.  Recent Labs     01/14/25  0516 01/15/25  0500 01/16/25  0445   HGB 8.3* 7.2* 7.7*   HCT 28.3* 25.0* 26.5*       Plan  - Monitor serial CBC: Every 12 hours  - Transfuse PRBC if patient becomes hemodynamically unstable, symptomatic or H/H drops below 7/21.  - Patient has not received any PRBC transfusions to date  - Patient's anemia is currently stable  -   Likely sec to Hematuria/Possible GI Bleed from Eliquis/ASA sec to Prograf toxicity  H/h dropped from 11 to 9 to 8.7 now. Pt also getting IVF now- will know pilar value in am    Stable H/H    H/H improved to 7.7/24 despite IVF    Melena  +ve melena  reported at home and Stool Heme +ve here with a drop in H/H here from 11 to 9.5 and then to 8.6. no hematemesis. Pt was on Eliquis and ASA- on hold  GI Consulted  Protonix    No obvious melena  GI on board  Possible Prograf induced Eliquis toxicity    resolved      Gross hematuria  Stable, has Purewick catheter now  Getting IVF, if gets worse, may need CBI    Still persists but a little better  If no improvement tomorrow- will try CBI    Improving with good Urine output, CBI not needed  Cont close monitoring      Tacrolimus-induced GI toxicity  Prograf level very high  Prograf on hold    Await repeat Prograf level      CKD (chronic kidney disease) stage 4, GFR 15-29 ml/min  Creatine stable for now. BMP reviewed- noted Estimated Creatinine Clearance: 30.3 mL/min (A) (based on SCr of 2.9 mg/dL (H)). according to latest data. Based on current GFR, CKD stage is stage 4 - GFR 15-29.  Monitor UOP and serial BMP and adjust therapy as needed. Renally dose meds. Avoid nephrotoxic medications and procedures.  Pt is d/p Renal Transplant in 2015, on Prograf and Cellcept    Edwina on CKD 4- sec to Prograf toxicity- Bleeding- started on IVF    Cont IVF    Improving with IVF, Cr coming down to 3      VTE Risk Mitigation (From admission, onward)           Ordered     Reason for No Pharmacological VTE Prophylaxis  Once        Question:  Reasons:  Answer:  Active Bleeding    01/12/25 1729     IP VTE HIGH RISK PATIENT  Once         01/12/25 1729     Place sequential compression device  Until discontinued         01/12/25 1729                    Discharge Planning   GRETEL:      Code Status: Full Code   Medical Readiness for Discharge Date:   Discharge Plan A: Home with family, Home          Jaymie Medley MD  Department of Hospital Medicine   [facility] - Telemetry ([facility])

## 2025-01-16 NOTE — ASSESSMENT & PLAN NOTE
Stable, has Purewick catheter now  Getting IVF, if gets worse, may need CBI    Still persists but a little better  If no improvement tomorrow- will try CBI    Improving with good Urine output, CBI not needed  Cont close monitoring

## 2025-01-16 NOTE — CONSULTS
"New wound care consult noted on Mr. Whittaker due to wound to "Right medial buttocks". Chart reviewed. Patient already followed by WOC team for multiple present on admission wounds including MASD to gluteal cleft region, as well as BLE wounds. He was seen on 1/13 for initial WOC consult, MASD to right gluteal cleft present and rec. Made: incontinence care, barrier paste, avoid diapers, apply comfort glide air+ for patient repositioning, use external catheter, initiate PIP orderset.  Per Photos, wound is resolving/healing well with currently treatment. Will continue to follow up as scheduled.  Photos:   1/13/25 1/16/24         "

## 2025-01-16 NOTE — SUBJECTIVE & OBJECTIVE
Interval History: looks better but c/o pain in his legs which are in a compression socks. Good response to iVF, Hematuria getting better and Cr down to 3 now with good urine output. H/H dropped to 7.2/22 sec to IVF and Hematuria. Still await repeat prograf level. Getting PT/OT, started on Norco for pain control.      Review of Systems   Constitutional:  Positive for activity change and appetite change. Negative for fever.   HENT:  Negative for hearing loss.    Eyes:  Negative for visual disturbance.   Respiratory:  Negative for cough and shortness of breath.    Cardiovascular:  Positive for chest pain (mild nagging pain in midline) and leg swelling. Negative for palpitations.   Gastrointestinal:         As per HPI.   Genitourinary:  Negative for difficulty urinating, dysuria, frequency and hematuria.   Musculoskeletal:  Negative for arthralgias and back pain.   Skin:  Positive for pallor and wound. Negative for color change.   Neurological:  Positive for weakness. Negative for seizures, syncope, numbness and headaches.   Hematological:  Does not bruise/bleed easily.   Psychiatric/Behavioral:  The patient is not nervous/anxious.      Objective:     Vital Signs (Most Recent):  Temp: 98.7 °F (37.1 °C) (01/16/25 0448)  Pulse: 90 (01/16/25 0448)  Resp: 18 (01/16/25 0448)  BP: 125/60 (01/16/25 0448)  SpO2: (!) 94 % (01/16/25 0448) Vital Signs (24h Range):  Temp:  [98.1 °F (36.7 °C)-101 °F (38.3 °C)] 98.7 °F (37.1 °C)  Pulse:  [] 90  Resp:  [17-20] 18  SpO2:  [93 %-96 %] 94 %  BP: (117-137)/(56-61) 125/60     Weight: 130.2 kg (287 lb 0.6 oz)  Body mass index is 44.96 kg/m².    Intake/Output Summary (Last 24 hours) at 1/16/2025 0636  Last data filed at 1/16/2025 0600  Gross per 24 hour   Intake 5282.54 ml   Output 1700 ml   Net 3582.54 ml         Physical Exam  Constitutional:       General: He is not in acute distress.     Appearance: Normal appearance. He is well-developed. He is obese. He is ill-appearing. He is  not toxic-appearing or diaphoretic.   HENT:      Head: Normocephalic and atraumatic.   Eyes:      Extraocular Movements: Extraocular movements intact.   Cardiovascular:      Rate and Rhythm: Regular rhythm. Tachycardia present.      Heart sounds: Normal heart sounds. No murmur heard.  Pulmonary:      Effort: Pulmonary effort is normal. No respiratory distress.      Breath sounds: Normal breath sounds. No wheezing.   Abdominal:      General: Bowel sounds are normal. There is no distension.      Palpations: Abdomen is soft. There is no mass.      Tenderness: There is no abdominal tenderness.   Musculoskeletal:         General: Swelling present.      Cervical back: Normal range of motion and neck supple.      Right lower leg: Edema present.      Left lower leg: Edema present.   Skin:     General: Skin is warm and dry.      Capillary Refill: Capillary refill takes 2 to 3 seconds.   Neurological:      General: No focal deficit present.      Mental Status: He is alert and oriented to person, place, and time.      Cranial Nerves: No cranial nerve deficit.   Psychiatric:         Mood and Affect: Mood normal.         Behavior: Behavior normal.         Thought Content: Thought content normal.         Judgment: Judgment normal.           Significant Labs: All pertinent labs within the past 24 hours have been reviewed.    Significant Imaging: I have reviewed all pertinent imaging results/findings within the past 24 hours.

## 2025-01-16 NOTE — ASSESSMENT & PLAN NOTE
Hyperkalemia is likely due to Increased potassium intake.The patients most recent potassium results are listed below.  Recent Labs     01/14/25  0516 01/15/25  0500 01/16/25  0445   K 6.0* 4.3  4.3 5.0       Plan  - Monitor for arrhythmias with EKG and/or continuous telemetry.   - Treat the hyperkalemia with Potassium Binders, Calcium gluconate, IV insulin and dextrose, Furosemide, and Sodium Bicarbonate.   - Monitor potassium: Every 12 hours  - The patient's hyperkalemia is worsening. Will continue current treatment    Pt was reportedly drinking lots of OJ ast home for last 3 days  Nephrology on board    Sec to ANGELITO on CKD 4 plus excess dietary Intake- better but still high- started on Lokelma and hold K supplements    K still a little high- likely sec to Prograf toxicity  cont Lokelma, iVF    Improved, down to 4.3- cont IVF and Lokelma  Renal following

## 2025-01-16 NOTE — ASSESSMENT & PLAN NOTE
Creatine stable for now. BMP reviewed- noted Estimated Creatinine Clearance: 30.3 mL/min (A) (based on SCr of 2.9 mg/dL (H)). according to latest data. Based on current GFR, CKD stage is stage 4 - GFR 15-29.  Monitor UOP and serial BMP and adjust therapy as needed. Renally dose meds. Avoid nephrotoxic medications and procedures.  Pt is d/p Renal Transplant in 2015, on Prograf and Cellcept    Edwina on CKD 4- sec to Prograf toxicity- Bleeding- started on IVF    Cont IVF    Improving with IVF, Cr coming down to 3

## 2025-01-16 NOTE — NURSING
Pt oriented x4. Pt remained tachycardic throughout shift.  Pt remained afebrile throughout this shift.   All meds administered per order. See MAR.   Pt remained free of falls this shift.   Plan of care reviewed. Patient verbalizes understanding.   Pt moving/turning x2-3 assist. Turned q2h.  Bed low, side rails up x 2, wheels locked, call light in reach. Bed alarm on.   Patient instructed to call for assistance.  Patient education provided

## 2025-01-17 LAB
ALBUMIN SERPL BCP-MCNC: 1.4 G/DL (ref 3.5–5.2)
ANION GAP SERPL CALC-SCNC: 10 MMOL/L (ref 8–16)
ANION GAP SERPL CALC-SCNC: 10 MMOL/L (ref 8–16)
ANISOCYTOSIS BLD QL SMEAR: SLIGHT
ANISOCYTOSIS BLD QL SMEAR: SLIGHT
BACTERIA BLD CULT: NORMAL
BACTERIA BLD CULT: NORMAL
BASOPHILS # BLD AUTO: ABNORMAL K/UL (ref 0–0.2)
BASOPHILS NFR BLD: 0 % (ref 0–1.9)
BASOPHILS NFR BLD: 0 % (ref 0–1.9)
BLD PROD TYP BPU: NORMAL
BLOOD UNIT EXPIRATION DATE: NORMAL
BLOOD UNIT TYPE CODE: 7300
BLOOD UNIT TYPE: NORMAL
BUN SERPL-MCNC: 43 MG/DL (ref 8–23)
BUN SERPL-MCNC: 45 MG/DL (ref 8–23)
CALCIUM SERPL-MCNC: 9.6 MG/DL (ref 8.7–10.5)
CALCIUM SERPL-MCNC: 9.6 MG/DL (ref 8.7–10.5)
CHLORIDE SERPL-SCNC: 111 MMOL/L (ref 95–110)
CHLORIDE SERPL-SCNC: 114 MMOL/L (ref 95–110)
CO2 SERPL-SCNC: 20 MMOL/L (ref 23–29)
CO2 SERPL-SCNC: 21 MMOL/L (ref 23–29)
CODING SYSTEM: NORMAL
CREAT SERPL-MCNC: 2.2 MG/DL (ref 0.5–1.4)
CREAT SERPL-MCNC: 2.3 MG/DL (ref 0.5–1.4)
CROSSMATCH INTERPRETATION: NORMAL
DACRYOCYTES BLD QL SMEAR: ABNORMAL
DIFFERENTIAL METHOD BLD: ABNORMAL
DIFFERENTIAL METHOD BLD: ABNORMAL
DISPENSE STATUS: NORMAL
EOSINOPHIL # BLD AUTO: ABNORMAL K/UL (ref 0–0.5)
EOSINOPHIL NFR BLD: 0 % (ref 0–8)
EOSINOPHIL NFR BLD: 2 % (ref 0–8)
ERYTHROCYTE [DISTWIDTH] IN BLOOD BY AUTOMATED COUNT: 14.3 % (ref 11.5–14.5)
ERYTHROCYTE [DISTWIDTH] IN BLOOD BY AUTOMATED COUNT: 14.6 % (ref 11.5–14.5)
EST. GFR  (NO RACE VARIABLE): 30 ML/MIN/1.73 M^2
EST. GFR  (NO RACE VARIABLE): 31 ML/MIN/1.73 M^2
GIANT PLATELETS BLD QL SMEAR: PRESENT
GIANT PLATELETS BLD QL SMEAR: PRESENT
GLUCOSE SERPL-MCNC: 170 MG/DL (ref 70–110)
GLUCOSE SERPL-MCNC: 179 MG/DL (ref 70–110)
HCT VFR BLD AUTO: 29.1 % (ref 40–54)
HCT VFR BLD AUTO: 29.9 % (ref 40–54)
HGB BLD-MCNC: 8.7 G/DL (ref 14–18)
HGB BLD-MCNC: 8.8 G/DL (ref 14–18)
IMM GRANULOCYTES # BLD AUTO: ABNORMAL K/UL (ref 0–0.04)
IMM GRANULOCYTES # BLD AUTO: ABNORMAL K/UL (ref 0–0.04)
IMM GRANULOCYTES NFR BLD AUTO: ABNORMAL % (ref 0–0.5)
IMM GRANULOCYTES NFR BLD AUTO: ABNORMAL % (ref 0–0.5)
LYMPHOCYTES # BLD AUTO: ABNORMAL K/UL (ref 1–4.8)
LYMPHOCYTES NFR BLD: 16 % (ref 18–48)
LYMPHOCYTES NFR BLD: 16.9 % (ref 18–48)
MCH RBC QN AUTO: 24.3 PG (ref 27–31)
MCH RBC QN AUTO: 24.8 PG (ref 27–31)
MCHC RBC AUTO-ENTMCNC: 29.4 G/DL (ref 32–36)
MCHC RBC AUTO-ENTMCNC: 29.9 G/DL (ref 32–36)
MCV RBC AUTO: 83 FL (ref 82–98)
MCV RBC AUTO: 83 FL (ref 82–98)
METAMYELOCYTES NFR BLD MANUAL: 1 %
MONOCYTES # BLD AUTO: ABNORMAL K/UL (ref 0.3–1)
MONOCYTES NFR BLD: 19 % (ref 4–15)
MONOCYTES NFR BLD: 19.3 % (ref 4–15)
NEUTROPHILS NFR BLD: 58 % (ref 38–73)
NEUTROPHILS NFR BLD: 60.2 % (ref 38–73)
NEUTS BAND NFR BLD MANUAL: 3.6 %
NEUTS BAND NFR BLD MANUAL: 4 %
NRBC BLD-RTO: 0 /100 WBC
NRBC BLD-RTO: 0 /100 WBC
NUM UNITS TRANS PACKED RBC: NORMAL
O+P STL MICRO: NORMAL
OHS QRS DURATION: 140 MS
OHS QTC CALCULATION: 514 MS
OVALOCYTES BLD QL SMEAR: ABNORMAL
PHOSPHATE SERPL-MCNC: 4.5 MG/DL (ref 2.7–4.5)
PLATELET # BLD AUTO: 238 K/UL (ref 150–450)
PLATELET # BLD AUTO: 245 K/UL (ref 150–450)
PLATELET BLD QL SMEAR: ABNORMAL
PLATELET BLD QL SMEAR: ABNORMAL
PMV BLD AUTO: 10 FL (ref 9.2–12.9)
PMV BLD AUTO: 10.5 FL (ref 9.2–12.9)
POCT GLUCOSE: 139 MG/DL (ref 70–110)
POCT GLUCOSE: 172 MG/DL (ref 70–110)
POCT GLUCOSE: 199 MG/DL (ref 70–110)
POCT GLUCOSE: 243 MG/DL (ref 70–110)
POIKILOCYTOSIS BLD QL SMEAR: SLIGHT
POIKILOCYTOSIS BLD QL SMEAR: SLIGHT
POLYCHROMASIA BLD QL SMEAR: ABNORMAL
POTASSIUM SERPL-SCNC: 4.5 MMOL/L (ref 3.5–5.1)
POTASSIUM SERPL-SCNC: 4.8 MMOL/L (ref 3.5–5.1)
PTH-INTACT SERPL-MCNC: 42.4 PG/ML (ref 9–77)
RBC # BLD AUTO: 3.51 M/UL (ref 4.6–6.2)
RBC # BLD AUTO: 3.62 M/UL (ref 4.6–6.2)
SCHISTOCYTES BLD QL SMEAR: PRESENT
SODIUM SERPL-SCNC: 142 MMOL/L (ref 136–145)
SODIUM SERPL-SCNC: 144 MMOL/L (ref 136–145)
URATE SERPL-MCNC: 9.2 MG/DL (ref 3.4–7)
WBC # BLD AUTO: 2.85 K/UL (ref 3.9–12.7)
WBC # BLD AUTO: 3.03 K/UL (ref 3.9–12.7)

## 2025-01-17 PROCEDURE — 30233N1 TRANSFUSION OF NONAUTOLOGOUS RED BLOOD CELLS INTO PERIPHERAL VEIN, PERCUTANEOUS APPROACH: ICD-10-PCS | Performed by: STUDENT IN AN ORGANIZED HEALTH CARE EDUCATION/TRAINING PROGRAM

## 2025-01-17 PROCEDURE — 36415 COLL VENOUS BLD VENIPUNCTURE: CPT

## 2025-01-17 PROCEDURE — 97535 SELF CARE MNGMENT TRAINING: CPT

## 2025-01-17 PROCEDURE — A4216 STERILE WATER/SALINE, 10 ML: HCPCS | Performed by: EMERGENCY MEDICINE

## 2025-01-17 PROCEDURE — 84165 PROTEIN E-PHORESIS SERUM: CPT

## 2025-01-17 PROCEDURE — 99232 SBSQ HOSP IP/OBS MODERATE 35: CPT | Mod: ,,, | Performed by: INTERNAL MEDICINE

## 2025-01-17 PROCEDURE — 86334 IMMUNOFIX E-PHORESIS SERUM: CPT

## 2025-01-17 PROCEDURE — 85007 BL SMEAR W/DIFF WBC COUNT: CPT | Mod: 91 | Performed by: STUDENT IN AN ORGANIZED HEALTH CARE EDUCATION/TRAINING PROGRAM

## 2025-01-17 PROCEDURE — 36415 COLL VENOUS BLD VENIPUNCTURE: CPT | Performed by: INTERNAL MEDICINE

## 2025-01-17 PROCEDURE — 36430 TRANSFUSION BLD/BLD COMPNT: CPT

## 2025-01-17 PROCEDURE — 84550 ASSAY OF BLOOD/URIC ACID: CPT | Performed by: INTERNAL MEDICINE

## 2025-01-17 PROCEDURE — 85007 BL SMEAR W/DIFF WBC COUNT: CPT | Performed by: INTERNAL MEDICINE

## 2025-01-17 PROCEDURE — 84165 PROTEIN E-PHORESIS SERUM: CPT | Mod: 26,,, | Performed by: PATHOLOGY

## 2025-01-17 PROCEDURE — 85027 COMPLETE CBC AUTOMATED: CPT | Mod: 91 | Performed by: STUDENT IN AN ORGANIZED HEALTH CARE EDUCATION/TRAINING PROGRAM

## 2025-01-17 PROCEDURE — 97167 OT EVAL HIGH COMPLEX 60 MIN: CPT

## 2025-01-17 PROCEDURE — 25000003 PHARM REV CODE 250: Performed by: INTERNAL MEDICINE

## 2025-01-17 PROCEDURE — 97110 THERAPEUTIC EXERCISES: CPT

## 2025-01-17 PROCEDURE — 21400001 HC TELEMETRY ROOM

## 2025-01-17 PROCEDURE — 63600175 PHARM REV CODE 636 W HCPCS: Performed by: EMERGENCY MEDICINE

## 2025-01-17 PROCEDURE — 25000003 PHARM REV CODE 250: Performed by: EMERGENCY MEDICINE

## 2025-01-17 PROCEDURE — 25000003 PHARM REV CODE 250: Performed by: HOSPITALIST

## 2025-01-17 PROCEDURE — 25000003 PHARM REV CODE 250: Performed by: STUDENT IN AN ORGANIZED HEALTH CARE EDUCATION/TRAINING PROGRAM

## 2025-01-17 PROCEDURE — 86334 IMMUNOFIX E-PHORESIS SERUM: CPT | Mod: 26,,, | Performed by: PATHOLOGY

## 2025-01-17 PROCEDURE — 83970 ASSAY OF PARATHORMONE: CPT

## 2025-01-17 PROCEDURE — 80048 BASIC METABOLIC PNL TOTAL CA: CPT | Performed by: STUDENT IN AN ORGANIZED HEALTH CARE EDUCATION/TRAINING PROGRAM

## 2025-01-17 PROCEDURE — 80197 ASSAY OF TACROLIMUS: CPT | Performed by: INTERNAL MEDICINE

## 2025-01-17 PROCEDURE — 85027 COMPLETE CBC AUTOMATED: CPT | Performed by: INTERNAL MEDICINE

## 2025-01-17 PROCEDURE — 80069 RENAL FUNCTION PANEL: CPT | Performed by: INTERNAL MEDICINE

## 2025-01-17 PROCEDURE — 25000003 PHARM REV CODE 250: Performed by: PHYSICIAN ASSISTANT

## 2025-01-17 PROCEDURE — P9016 RBC LEUKOCYTES REDUCED: HCPCS | Performed by: HOSPITALIST

## 2025-01-17 RX ORDER — NYSTATIN 100000 [USP'U]/ML
500000 SUSPENSION ORAL 4 TIMES DAILY
Status: DISCONTINUED | OUTPATIENT
Start: 2025-01-17 | End: 2025-01-29 | Stop reason: HOSPADM

## 2025-01-17 RX ORDER — METOPROLOL TARTRATE 1 MG/ML
5 INJECTION, SOLUTION INTRAVENOUS EVERY 6 HOURS PRN
Status: DISCONTINUED | OUTPATIENT
Start: 2025-01-17 | End: 2025-01-29 | Stop reason: HOSPADM

## 2025-01-17 RX ADMIN — MYCOPHENOLATE MOFETIL 500 MG: 500 TABLET, FILM COATED ORAL at 10:01

## 2025-01-17 RX ADMIN — PANTOPRAZOLE SODIUM 40 MG: 40 TABLET, DELAYED RELEASE ORAL at 09:01

## 2025-01-17 RX ADMIN — INSULIN ASPART 4 UNITS: 100 INJECTION, SOLUTION INTRAVENOUS; SUBCUTANEOUS at 04:01

## 2025-01-17 RX ADMIN — INSULIN GLARGINE 10 UNITS: 100 INJECTION, SOLUTION SUBCUTANEOUS at 09:01

## 2025-01-17 RX ADMIN — SODIUM ZIRCONIUM CYCLOSILICATE 10 G: 5 POWDER, FOR SUSPENSION ORAL at 10:01

## 2025-01-17 RX ADMIN — CYANOCOBALAMIN 1000 MCG: 1000 INJECTION INTRAMUSCULAR; SUBCUTANEOUS at 10:01

## 2025-01-17 RX ADMIN — Medication 10 ML: at 09:01

## 2025-01-17 RX ADMIN — TACROLIMUS 3 MG: 1 CAPSULE ORAL at 06:01

## 2025-01-17 RX ADMIN — TACROLIMUS 3 MG: 1 CAPSULE ORAL at 10:01

## 2025-01-17 RX ADMIN — NYSTATIN 500000 UNITS: 100000 SUSPENSION ORAL at 09:01

## 2025-01-17 RX ADMIN — INSULIN ASPART 2 UNITS: 100 INJECTION, SOLUTION INTRAVENOUS; SUBCUTANEOUS at 12:01

## 2025-01-17 RX ADMIN — Medication 10 ML: at 02:01

## 2025-01-17 RX ADMIN — PANTOPRAZOLE SODIUM 40 MG: 40 TABLET, DELAYED RELEASE ORAL at 10:01

## 2025-01-17 RX ADMIN — METOPROLOL SUCCINATE 25 MG: 25 TABLET, FILM COATED, EXTENDED RELEASE ORAL at 10:01

## 2025-01-17 RX ADMIN — INSULIN GLARGINE 10 UNITS: 100 INJECTION, SOLUTION SUBCUTANEOUS at 10:01

## 2025-01-17 RX ADMIN — INSULIN ASPART 2 UNITS: 100 INJECTION, SOLUTION INTRAVENOUS; SUBCUTANEOUS at 06:01

## 2025-01-17 RX ADMIN — MYCOPHENOLATE MOFETIL 500 MG: 500 TABLET, FILM COATED ORAL at 09:01

## 2025-01-17 NOTE — PROGRESS NOTES
[facility] Medicine  Progress Note    Patient Name: Mitch Whittaker  MRN: [id number]  Patient Class: IP- Inpatient   Admission Date: 1/12/2025  Length of Stay: 4 days  Attending Physician: Jaymie Medley MD  Primary Care Provider: Valery Caal MD        Subjective     Principal Problem:Hyperkalemia        HPI:  Mitch Whittaker is a 69 y.o. male patient with a PMHx of CHF- EF 40%, anemia, hyperparathyroidism, type 2 DM, s/p kidney transplant 2015 on Prograf and Cellcept, DVT on Eliquis, MARION, HLD, HTN, CKD, Hep C, and arthritis who presents to the Emergency Department for evaluation of nausea vomiting and diarrhea over the past several days (both of which have improved), but felt weak and couldn't move around like normal. no abd pain, no systemic symptoms, stool now formed. Pt is a very poor historian. According to nursing staff, family reported that the pt has been sitting in his chair for the last 3 days. Pt reports that he has had two episodes of diarrhea since yesterday, but due to the increasing weakness in his knees he was unable to get up from his chair. Pt normally ambulates with assistance from a walker. Symptoms are constant and moderate in severity. Associated sxs include blood in stool (x4 days) and n/v yesterday, but none today after PO. Patient denies any fever, abdominal pain, appetite changes, SOB, dysuria, and all other sxs at this time. No prior tx. Pt is on Eliquis. No further complaints or concerns at this time.   In the ER, VS /65, , Sats 100% on RA, Afeb, WBC 3.7, H/H 9.5/33, Bun/Cr 82/4.3, K 6.7- treated aggressively in the ER and rechecked and repeat 5.4. He is heme positive as well. C Diff Neg. She is being admitted for possible Hyperkalemia, acute GI Bleed, ANGELITO.     Overview/Hospital Course:  69 y.o. male patient with a PMHx of CHF- EF 40%, anemia, hyperparathyroidism, type 2 DM, s/p kidney transplant 2015 on Prograf and Cellcept, DVT on  Eliquis, MARION, HLD, HTN, CKD, Hep C, and arthritis who presents to the Emergency Department for evaluation of nausea vomiting and diarrhea over the past several days (both of which have improved), but felt weak and couldn't move around like normal. no abd pain, no systemic symptoms, stool now formed. Pt is a very poor historian. According to nursing staff, family reported that the pt has been sitting in his chair for the last 3 days. Pt reports that he has had two episodes of diarrhea since yesterday, but due to the increasing weakness in his knees he was unable to get up from his chair. Pt normally ambulates with assistance from a walker. Symptoms are constant and moderate in severity. Associated sxs include blood in stool (x4 days) and n/v yesterday, but none today after PO. Patient denies any fever, abdominal pain, appetite changes, SOB, dysuria, and all other sxs at this time. No prior tx. Pt is on Eliquis. No further complaints or concerns at this time.   In the ER, VS /65, , Sats 100% on RA, Afeb, WBC 3.7, H/H 9.5/33, Bun/Cr 82/4.3, K 6.7- treated aggressively in the ER and rechecked and repeat 5.4. He is heme positive as well. C Diff Neg. She is being admitted for possible Hyperkalemia, acute GI Bleed, ANGELITO.     1/13- Appreciate Renal and GI input- pt looks and feels a little better, stronger, more alert and responsive. Wife and daughter are here. His prograf level very high- 32- hence Held. It seems that he was getting Prograf toxicity at home which then resulted in Hematuria, ABL Anemia, ANGELITO and Hyperkalemia. Pt also felt weak and low sugars, so drank a lot of OJ which further raised his K level. Does not appears to have true GI bleed. But has hematuria- hence Purewick catheter placed and Dr. Bennett also started him on IVF- hematuria appears to be clearing. Pt feeling better, will check labs in am and start PT/OT. K was 6.1 this am- started on lokelma.     1/14- looks a little better, no melena  but still has hematuria. H/h stable, 8.3/28, K still 6, Bun.Cr still high 83/4. Repeat Prograf level pending. VSS Afeb, he is more alert and talking. Wound care wrapped his legs with moderate compression. Had mod R SFA stenosis, vascular evaluated him and will continue to monitor him, no surgery or angioplasty needed at this time. Will start PT/OT in am. Cont IVF, If Hematuria persists, start CBI in am.     1/15- looks better but c/o pain in his legs which are in a compression socks. Good response to iVF, Hematuria getting better and Cr down to 3 now with good urine output. H/H dropped to 7.2/22 sec to IVF and Hematuria. Still await repeat prograf level. Getting PT/OT, started on Norco for pain control.      1/16- resting quietly, comfortable, making good amount of urine, hematuria almost cleared with IVF. Bun/Cr down to 58/2.9. K normal, lokelm d/koki. H/H improved to 7.7/26. Prograf noiw 5.2, will restart at 3 mg bid. Stop hydration in am, start PT/OT. D/c home soon.     Interval History: resting quietly, comfortable, making good amount of urine, hematuria almost cleared with IVF. Bun/Cr down to 58/2.9. K normal, lokelm d/koki. H/H improved to 7.7/26. Prograf noiw 5.2, will restart at 3 mg bid. Stop hydration in am, start PT/OT. D/c home soon.     Review of Systems   Constitutional:  Positive for activity change and appetite change. Negative for fever.   HENT:  Negative for hearing loss.    Eyes:  Negative for visual disturbance.   Respiratory:  Negative for cough and shortness of breath.    Cardiovascular:  Positive for leg swelling. Negative for chest pain (mild nagging pain in midline) and palpitations.   Gastrointestinal:         As per HPI.   Genitourinary:  Negative for difficulty urinating, dysuria, frequency and hematuria.   Musculoskeletal:  Negative for arthralgias and back pain.   Skin:  Positive for pallor and wound. Negative for color change.   Neurological:  Positive for weakness. Negative for  seizures, syncope, numbness and headaches.   Hematological:  Does not bruise/bleed easily.   Psychiatric/Behavioral:  The patient is not nervous/anxious.      Objective:     Vital Signs (Most Recent):  Temp: 97.9 °F (36.6 °C) (01/16/25 1619)  Pulse: (!) 114 (01/16/25 1644)  Resp: 20 (01/16/25 1758)  BP: (!) 105/52 (01/16/25 1619)  SpO2: (!) 93 % (01/16/25 1619) Vital Signs (24h Range):  Temp:  [97.4 °F (36.3 °C)-101 °F (38.3 °C)] 97.9 °F (36.6 °C)  Pulse:  [] 114  Resp:  [18-20] 20  SpO2:  [93 %-96 %] 93 %  BP: (105-138)/(52-68) 105/52     Weight: 130.2 kg (287 lb 0.6 oz)  Body mass index is 44.96 kg/m².    Intake/Output Summary (Last 24 hours) at 1/16/2025 1807  Last data filed at 1/16/2025 0600  Gross per 24 hour   Intake 5282.54 ml   Output 1700 ml   Net 3582.54 ml         Physical Exam  Constitutional:       General: He is not in acute distress.     Appearance: Normal appearance. He is well-developed. He is obese. He is ill-appearing. He is not toxic-appearing or diaphoretic.   HENT:      Head: Normocephalic and atraumatic.   Eyes:      Extraocular Movements: Extraocular movements intact.   Cardiovascular:      Rate and Rhythm: Regular rhythm. Tachycardia present.      Heart sounds: Normal heart sounds. No murmur heard.  Pulmonary:      Effort: Pulmonary effort is normal. No respiratory distress.      Breath sounds: Normal breath sounds. No wheezing.   Abdominal:      General: Bowel sounds are normal. There is no distension.      Palpations: Abdomen is soft. There is no mass.      Tenderness: There is no abdominal tenderness.   Musculoskeletal:         General: Swelling present.      Cervical back: Normal range of motion and neck supple.      Right lower leg: Edema present.      Left lower leg: Edema present.   Skin:     General: Skin is warm and dry.      Capillary Refill: Capillary refill takes 2 to 3 seconds.   Neurological:      General: No focal deficit present.      Mental Status: He is alert and  oriented to person, place, and time.      Cranial Nerves: No cranial nerve deficit.      Motor: Weakness present.   Psychiatric:         Mood and Affect: Mood normal.         Behavior: Behavior normal.         Thought Content: Thought content normal.         Judgment: Judgment normal.             Significant Labs: All pertinent labs within the past 24 hours have been reviewed.  BMP:   Recent Labs   Lab 01/16/25  0445   *      K 5.0   *   CO2 20*   BUN 58*   CREATININE 2.9*   CALCIUM 9.2     CBC:   Recent Labs   Lab 01/15/25  0500 01/16/25  0445   WBC 2.54* 2.78*   HGB 7.2* 7.7*   HCT 25.0* 26.5*    225       Significant Imaging: I have reviewed all pertinent imaging results/findings within the past 24 hours.    Assessment and Plan     * Hyperkalemia  Hyperkalemia is likely due to Increased potassium intake.The patients most recent potassium results are listed below.  Recent Labs     01/14/25  0516 01/15/25  0500 01/16/25  0445   K 6.0* 4.3  4.3 5.0       Plan  - Monitor for arrhythmias with EKG and/or continuous telemetry.   - Treat the hyperkalemia with Potassium Binders, Calcium gluconate, IV insulin and dextrose, Furosemide, and Sodium Bicarbonate.   - Monitor potassium: Every 12 hours  - The patient's hyperkalemia is worsening. Will continue current treatment    Pt was reportedly drinking lots of OJ ast home for last 3 days  Nephrology on board    Sec to ANGELITO on CKD 4 plus excess dietary Intake- better but still high- started on Lokelma and hold K supplements    K still a little high- likely sec to Prograf toxicity  cont Lokelma, iVF    Improved, down to 4.3- cont IVF and Lokelma  Renal following    Resolved, Lokelma stopped by Renal  Cont IVF    ABL Anemia plus anemia of CKD 4      Anemia is likely due to acute blood loss which was from CKD, s/p Renal transplant and chronic disease due to Chronic Kidney Disease. Most recent hemoglobin and hematocrit are listed below.  Recent Labs      01/14/25  0516 01/15/25  0500 01/16/25  0445   HGB 8.3* 7.2* 7.7*   HCT 28.3* 25.0* 26.5*       Plan  - Monitor serial CBC: Every 12 hours  - Transfuse PRBC if patient becomes hemodynamically unstable, symptomatic or H/H drops below 7/21.  - Patient has not received any PRBC transfusions to date  - Patient's anemia is currently stable  -   Likely sec to Hematuria/Possible GI Bleed from Eliquis/ASA sec to Prograf toxicity  H/h dropped from 11 to 9 to 8.7 now. Pt also getting IVF now- will know pilar value in am    Stable H/H    H/H improved to 7.7/24 despite IVF    Improved further to 7.7/26    Gross hematuria  Stable, has Purewick catheter now  Getting IVF, if gets worse, may need CBI    Still persists but a little better  If no improvement tomorrow- will try CBI    Improving with good Urine output, CBI not needed  Cont close monitoring      Improving, cont IVF    Tacrolimus-induced GI toxicity  Prograf level very high  Prograf on hold    Await repeat Prograf level    Repeat levels normal- will restart Prograf from am at 3 mg bid      CKD (chronic kidney disease) stage 4, GFR 15-29 ml/min  Creatine stable for now. BMP reviewed- noted Estimated Creatinine Clearance: 30.3 mL/min (A) (based on SCr of 2.9 mg/dL (H)). according to latest data. Based on current GFR, CKD stage is stage 4 - GFR 15-29.  Monitor UOP and serial BMP and adjust therapy as needed. Renally dose meds. Avoid nephrotoxic medications and procedures.  Pt is d/p Renal Transplant in 2015, on Prograf and Cellcept    Edwina on CKD 4- sec to Prograf toxicity- Bleeding- started on IVF    Cont IVF    Improving with IVF, Cr coming down to 3      VTE Risk Mitigation (From admission, onward)           Ordered     Reason for No Pharmacological VTE Prophylaxis  Once        Question:  Reasons:  Answer:  Active Bleeding    01/12/25 1729     IP VTE HIGH RISK PATIENT  Once         01/12/25 1729     Place sequential compression device  Until discontinued         01/12/25  1729                    Discharge Planning   GRETEL:      Code Status: Full Code   Medical Readiness for Discharge Date:   Discharge Plan A: Home with family, Home        Please place Justification for DME    Jaymie Medley MD  Department of Hospital Medicine   '[place] - Telemetry ([facility])

## 2025-01-17 NOTE — NURSING
Pt oriented x4. Pt remained tachycardic throughout shift.  Pt remained afebrile throughout this shift.   All meds administered per order. See MAR.   Pt remained free of falls this shift.   Plan of care reviewed. Patient verbalizes understanding.   Pt moving/turning x2-3 assist. Turned q2h. Sizewise bed in place.  Bed low, side rails up x 2, wheels locked, call light in reach. Bed alarm on.   Patient instructed to call for assistance.  Patient education provided

## 2025-01-17 NOTE — PLAN OF CARE
Patient required Min Assist to feed himself with non-dominant arm d/t weakness and dec coordination. Rolling to left with Maximal Assistance and bed rail x 3 trials. Passive ROM to B LE limited by pain, but improved with inc reps. Good motivation and rehab potent. Recommending High Intensity Therapy.

## 2025-01-17 NOTE — SUBJECTIVE & OBJECTIVE
Interval History: resting quietly, comfortable, making good amount of urine, hematuria almost cleared with IVF. Bun/Cr down to 58/2.9. K normal, lokelm d/koki. H/H improved to 7.7/26. Prograf noiw 5.2, will restart at 3 mg bid. Stop hydration in am, start PT/OT. D/c home soon.     Review of Systems   Constitutional:  Positive for activity change and appetite change. Negative for fever.   HENT:  Negative for hearing loss.    Eyes:  Negative for visual disturbance.   Respiratory:  Negative for cough and shortness of breath.    Cardiovascular:  Positive for leg swelling. Negative for chest pain (mild nagging pain in midline) and palpitations.   Gastrointestinal:         As per HPI.   Genitourinary:  Negative for difficulty urinating, dysuria, frequency and hematuria.   Musculoskeletal:  Negative for arthralgias and back pain.   Skin:  Positive for pallor and wound. Negative for color change.   Neurological:  Positive for weakness. Negative for seizures, syncope, numbness and headaches.   Hematological:  Does not bruise/bleed easily.   Psychiatric/Behavioral:  The patient is not nervous/anxious.      Objective:     Vital Signs (Most Recent):  Temp: 97.9 °F (36.6 °C) (01/16/25 1619)  Pulse: (!) 114 (01/16/25 1644)  Resp: 20 (01/16/25 1758)  BP: (!) 105/52 (01/16/25 1619)  SpO2: (!) 93 % (01/16/25 1619) Vital Signs (24h Range):  Temp:  [97.4 °F (36.3 °C)-101 °F (38.3 °C)] 97.9 °F (36.6 °C)  Pulse:  [] 114  Resp:  [18-20] 20  SpO2:  [93 %-96 %] 93 %  BP: (105-138)/(52-68) 105/52     Weight: 130.2 kg (287 lb 0.6 oz)  Body mass index is 44.96 kg/m².    Intake/Output Summary (Last 24 hours) at 1/16/2025 1807  Last data filed at 1/16/2025 0600  Gross per 24 hour   Intake 5282.54 ml   Output 1700 ml   Net 3582.54 ml         Physical Exam  Constitutional:       General: He is not in acute distress.     Appearance: Normal appearance. He is well-developed. He is obese. He is ill-appearing. He is not toxic-appearing or  diaphoretic.   HENT:      Head: Normocephalic and atraumatic.   Eyes:      Extraocular Movements: Extraocular movements intact.   Cardiovascular:      Rate and Rhythm: Regular rhythm. Tachycardia present.      Heart sounds: Normal heart sounds. No murmur heard.  Pulmonary:      Effort: Pulmonary effort is normal. No respiratory distress.      Breath sounds: Normal breath sounds. No wheezing.   Abdominal:      General: Bowel sounds are normal. There is no distension.      Palpations: Abdomen is soft. There is no mass.      Tenderness: There is no abdominal tenderness.   Musculoskeletal:         General: Swelling present.      Cervical back: Normal range of motion and neck supple.      Right lower leg: Edema present.      Left lower leg: Edema present.   Skin:     General: Skin is warm and dry.      Capillary Refill: Capillary refill takes 2 to 3 seconds.   Neurological:      General: No focal deficit present.      Mental Status: He is alert and oriented to person, place, and time.      Cranial Nerves: No cranial nerve deficit.      Motor: Weakness present.   Psychiatric:         Mood and Affect: Mood normal.         Behavior: Behavior normal.         Thought Content: Thought content normal.         Judgment: Judgment normal.             Significant Labs: All pertinent labs within the past 24 hours have been reviewed.  BMP:   Recent Labs   Lab 01/16/25  0445   *      K 5.0   *   CO2 20*   BUN 58*   CREATININE 2.9*   CALCIUM 9.2     CBC:   Recent Labs   Lab 01/15/25  0500 01/16/25  0445   WBC 2.54* 2.78*   HGB 7.2* 7.7*   HCT 25.0* 26.5*    225       Significant Imaging: I have reviewed all pertinent imaging results/findings within the past 24 hours.

## 2025-01-17 NOTE — PROGRESS NOTES
[facility]  Progress Note    Patient Name: Mitch Whittaker  MRN: [id number]  Patient Class: IP- Inpatient   Admission Date: 1/12/2025  Length of Stay: 5 days  Attending Physician: Chidi James MD  Primary Care Provider: Valery Caal MD        Subjective     Principal Problem:Hyperkalemia        HPI:  Mitch Whittaker is a 69 y.o. male patient with a PMHx of CHF- EF 40%, anemia, hyperparathyroidism, type 2 DM, s/p kidney transplant 2015 on Prograf and Cellcept, DVT on Eliquis, MARION, HLD, HTN, CKD, Hep C, and arthritis who presents to the Emergency Department for evaluation of nausea vomiting and diarrhea over the past several days (both of which have improved), but felt weak and couldn't move around like normal. no abd pain, no systemic symptoms, stool now formed. Pt is a very poor historian. According to nursing staff, family reported that the pt has been sitting in his chair for the last 3 days. Pt reports that he has had two episodes of diarrhea since yesterday, but due to the increasing weakness in his knees he was unable to get up from his chair. Pt normally ambulates with assistance from a walker. Symptoms are constant and moderate in severity. Associated sxs include blood in stool (x4 days) and n/v yesterday, but none today after PO. Patient denies any fever, abdominal pain, appetite changes, SOB, dysuria, and all other sxs at this time. No prior tx. Pt is on Eliquis. No further complaints or concerns at this time.   In the ER, VS /65, , Sats 100% on RA, Afeb, WBC 3.7, H/H 9.5/33, Bun/Cr 82/4.3, K 6.7- treated aggressively in the ER and rechecked and repeat 5.4. He is heme positive as well. C Diff Neg. She is being admitted for possible Hyperkalemia, acute GI Bleed, ANGELITO.     Overview/Hospital Course:  69 y.o. male patient with a PMHx of CHF- EF 40%, anemia, hyperparathyroidism, type 2 DM, s/p kidney transplant 2015 on Prograf and Cellcept, DVT on  Eliquis, MARION, HLD, HTN, CKD, Hep C, and arthritis who presents to the Emergency Department for evaluation of nausea vomiting and diarrhea over the past several days (both of which have improved), but felt weak and couldn't move around like normal. no abd pain, no systemic symptoms, stool now formed. Pt is a very poor historian. According to nursing staff, family reported that the pt has been sitting in his chair for the last 3 days. Pt reports that he has had two episodes of diarrhea since yesterday, but due to the increasing weakness in his knees he was unable to get up from his chair. Pt normally ambulates with assistance from a walker. Symptoms are constant and moderate in severity. Associated sxs include blood in stool (x4 days) and n/v yesterday, but none today after PO. Patient denies any fever, abdominal pain, appetite changes, SOB, dysuria, and all other sxs at this time. No prior tx. Pt is on Eliquis. No further complaints or concerns at this time.   In the ER, VS /65, , Sats 100% on RA, Afeb, WBC 3.7, H/H 9.5/33, Bun/Cr 82/4.3, K 6.7- treated aggressively in the ER and rechecked and repeat 5.4. He is heme positive as well. C Diff Neg. She is being admitted for possible Hyperkalemia, acute GI Bleed, ANGELITO.     1/13- Appreciate Renal and GI input- pt looks and feels a little better, stronger, more alert and responsive. Wife and daughter are here. His prograf level very high- 32- hence Held. It seems that he was getting Prograf toxicity at home which then resulted in Hematuria, ABL Anemia, ANGELITO and Hyperkalemia. Pt also felt weak and low sugars, so drank a lot of OJ which further raised his K level. Does not appears to have true GI bleed. But has hematuria- hence Purewick catheter placed and Dr. Bennett also started him on IVF- hematuria appears to be clearing. Pt feeling better, will check labs in am and start PT/OT. K was 6.1 this am- started on lokelma.     1/14- looks a little better, no melena  "but still has hematuria. H/h stable, 8.3/28, K still 6, Bun.Cr still high 83/4. Repeat Prograf level pending. VSS Afeb, he is more alert and talking. Wound care wrapped his legs with moderate compression. Had mod R SFA stenosis, vascular evaluated him and will continue to monitor him, no surgery or angioplasty needed at this time. Will start PT/OT in am. Cont IVF, If Hematuria persists, start CBI in am.     1/15- looks better but c/o pain in his legs which are in a compression socks. Good response to iVF, Hematuria getting better and Cr down to 3 now with good urine output. H/H dropped to 7.2/22 sec to IVF and Hematuria. Still await repeat prograf level. Getting PT/OT, started on Norco for pain control.      1/16- resting quietly, comfortable, making good amount of urine, hematuria almost cleared with IVF. Bun/Cr down to 58/2.9. K normal, lokelm d/koki. H/H improved to 7.7/26. Prograf noiw 5.2, will restart at 3 mg bid. Stop hydration in am, start PT/OT. D/c home soon.     01/17/2025  Patient requiring max assists with bed transitions and feeding. High intensity therapy recommended by therapists. Patient previously independent, living at home.    Interval history:  See hospital course    Objective:   /66 (BP Location: Left arm, Patient Position: Lying)   Pulse 104   Temp 97.7 °F (36.5 °C) (Oral)   Resp 16   Ht 5' 7" (1.702 m)   Wt 130.2 kg (287 lb 0.6 oz)   SpO2 95%   BMI 44.96 kg/m²     Intake/Output Summary (Last 24 hours) at 1/17/2025 1127  Last data filed at 1/17/2025 0621  Gross per 24 hour   Intake 1933.14 ml   Output 1600 ml   Net 333.14 ml       PHYSICAL EXAM  Vitals reviewed  Constitutional:       General: He is not in acute distress.     Appearance: Normal appearance. He is well-developed. He is obese. He is ill-appearing. He is not toxic-appearing or diaphoretic.   Cardiovascular:      Rate and Rhythm: Regular rhythm. Tachycardia present.      Heart sounds: Normal heart sounds. No murmur " "heard.  Pulmonary:      Effort: Pulmonary effort is normal. No respiratory distress.      Breath sounds: Normal breath sounds. No wheezing.   Abdominal:      General: Bowel sounds are normal. There is no distension.      Palpations: Abdomen is soft. There is no mass.      Tenderness: There is no abdominal tenderness.   Musculoskeletal:         General: Swelling present.      Cervical back: Normal range of motion and neck supple.      Right lower leg: Edema present.      Left lower leg: Edema present.   Skin:     General: Skin is warm and dry.      Capillary Refill: Capillary refill takes 2 to 3 seconds.   Neurological:      General: No focal deficit present.      Mental Status: He is alert and oriented to person, place, and time.      Cranial Nerves: No cranial nerve deficit.      Motor: Weakness present.   Psychiatric:         Mood and Affect: Mood normal.         Behavior: Behavior normal.         Thought Content: Thought content normal.         Judgment: Judgment normal.     LABS  All labs from the past 24 hours were reviewed.     BMP:   Recent Labs   Lab 01/17/25  0659   *      K 4.8   *   CO2 20*   BUN 45*   CREATININE 2.3*   CALCIUM 9.6     CBC:   Recent Labs   Lab 01/16/25  0445 01/17/25  0700   WBC 2.78* 2.85*   HGB 7.7* 8.8*   HCT 26.5* 29.9*    238     CMP:   Recent Labs   Lab 01/16/25  0445 01/17/25  0659    144   K 5.0 4.8   * 114*   CO2 20* 20*   * 179*   BUN 58* 45*   CREATININE 2.9* 2.3*   CALCIUM 9.2 9.6   ALBUMIN 1.4* 1.4*   ANIONGAP 9 10     Cardiac Markers: No results for input(s): "CKMB", "MYOGLOBIN", "BNP", "TROPISTAT" in the last 48 hours.  Coagulation: No results for input(s): "PT", "INR", "APTT" in the last 48 hours.  Lactic Acid: No results for input(s): "LACTATE" in the last 48 hours.  Magnesium: No results for input(s): "MG" in the last 48 hours.  Troponin:   Recent Labs   Lab 01/16/25  2214   TROPONINI 0.046*     TSH:   No results for input(s): " ""TSH" in the last 4320 hours.  Urine Studies:   No results for input(s): "COLORU", "APPEARANCEUA", "PHUR", "SPECGRAV", "PROTEINUA", "GLUCUA", "KETONESU", "BILIRUBINUA", "OCCULTUA", "NITRITE", "UROBILINOGEN", "LEUKOCYTESUR", "RBCUA", "WBCUA", "BACTERIA", "SQUAMEPITHEL", "HYALINECASTS" in the last 48 hours.    Invalid input(s): "WRIGHTSUR"    IMAGING  All imaging from the past 24 hours were reviewed.     Imaging Results              US Transplant Kidney With Doppler (Final result)  Result time 01/12/25 14:00:36   Procedure changed from US Retroperitoneal Complete     Final result by Joe Leach MD (01/12/25 14:00:36)                   Impression:      1.  Interval increase in main transplant renal artery velocities, currently measuring 170 centimeters/second (previously measuring 146 centimeters/second).  There is also interval increase in range of resistive indices in the segmental arteries, ranging between 0.82 and 0.88 (previously measuring 0.73-0.76).  This can be seen with infectious or inflammatory process, or early rejection changes.  Negative for hydronephrosis.  Negative for renal lesions.      Electronically signed by: Joe Leach MD  Date:    01/12/2025  Time:    14:00               Narrative:    EXAMINATION:  US TRANSPLANT KIDNEY WITH DOPPLER    CLINICAL HISTORY:  ANGELITO in a transplant;    TECHNIQUE:  Transplant renal ultrasound of the right lower quadrant with color flow doppler and duplex analysis.    COMPARISON:  December 6, 2021    FINDINGS:  A transplanted kidney measures 9.5 centimeters in length.  Normal perfusion.  There is no hydronephrosis.No fluid collections.    Resistive indices ranged from 0.82 to 0.88.  Velocity in main renal artery is 170 cm/sec and the renal artery/iliac ratio is 0.9.  The main renal vein is patent.    The urinary bladder is contracted and thick walled.                                       X-Ray Chest AP Portable (Final result)  Result time 01/12/25 07:00:01      " Final result by Joe Leach MD (01/12/25 07:00:01)                   Impression:      1.  Negative for acute process involving the chest.    2.  Stable findings as noted above.      Electronically signed by: Joe Leach MD  Date:    01/12/2025  Time:    07:00               Narrative:    EXAMINATION:  XR CHEST AP PORTABLE    CLINICAL HISTORY:  Weakness    COMPARISON:  August 22, 2023    FINDINGS:  The study is lordotic in position.  Chronically low lung volumes with elevation of the right hemidiaphragm.  The lungs are free of new pulmonary opacity.  The cardiac silhouette size is borderline enlarged.  The trachea is midline and the mediastinal width is normal. Negative for focal infiltrate, effusion or pneumothorax. Pulmonary vasculature is normal. Negative for osseous abnormalities. Convex right curvature of the thoracic spine with marginal spondylosis.  Tortuous aorta with calcifications of the aortic knob.  Stable vascular stent in the right axilla.                                       X-Ray Abdomen Flat And Erect (Final result)  Result time 01/12/25 06:56:43      Final result by Joe Leach MD (01/12/25 06:56:43)                   Impression:      1.  Negative for acute process.      Electronically signed by: Joe Leach MD  Date:    01/12/2025  Time:    06:56               Narrative:    EXAMINATION:  XR ABDOMEN FLAT AND ERECT    CLINICAL HISTORY:  Vomiting, unspecified    TECHNIQUE:  Flat and erect AP views of the abdomen were performed.    COMPARISON:  Abdomen and pelvis CT scan from April 14, 2024    FINDINGS:  Normal bowel gas pattern.  Negative for free air.    Stable degenerative changes of the spine and pelvis.  Lung bases are clear.                                        Assessment and Plan     * Hyperkalemia  Hyperkalemia is likely due to Increased potassium intake.The patients most recent potassium results are listed below.  Recent Labs     01/14/25  0516 01/15/25  0500 01/16/25  0445   K  6.0* 4.3  4.3 5.0       Plan  - Monitor for arrhythmias with EKG and/or continuous telemetry.   - Treat the hyperkalemia with Potassium Binders, Calcium gluconate, IV insulin and dextrose, Furosemide, and Sodium Bicarbonate.   - Monitor potassium: Every 12 hours  - The patient's hyperkalemia is worsening. Will continue current treatment    Pt was reportedly drinking lots of OJ ast home for last 3 days  Nephrology on board    Sec to EDWINA on CKD 4 plus excess dietary Intake- better but still high- started on Lokelma and hold K supplements    K still a little high- likely sec to Prograf toxicity  cont Lokelma, iVF    Improved, down to 4.3- cont IVF and Lokelma  Renal following    Resolved, Lokelma stopped by Renal  Cont IVF    Tacrolimus-induced GI toxicity  Prograf level very high  Prograf on hold    Await repeat Prograf level    Repeat levels normal- will restart Prograf from am at 3 mg bid      Gross hematuria  Stable, has Purewick catheter now  Getting IVF, if gets worse, may need CBI    Still persists but a little better  If no improvement tomorrow- will try CBI    Improving with good Urine output, CBI not needed  Cont close monitoring      Improving, cont IVF    CKD (chronic kidney disease) stage 4, GFR 15-29 ml/min  Creatine stable for now. BMP reviewed- noted Estimated Creatinine Clearance: 30.3 mL/min (A) (based on SCr of 2.9 mg/dL (H)). according to latest data. Based on current GFR, CKD stage is stage 4 - GFR 15-29.  Monitor UOP and serial BMP and adjust therapy as needed. Renally dose meds. Avoid nephrotoxic medications and procedures.  Pt is d/p Renal Transplant in 2015, on Prograf and Cellcept    Edwina on CKD 4- sec to Prograf toxicity- Bleeding- started on IVF    Cont IVF    Improving with IVF, Cr coming down to 3    ABL Anemia plus anemia of CKD 4      Anemia is likely due to acute blood loss which was from CKD, s/p Renal transplant and chronic disease due to Chronic Kidney Disease. Most recent hemoglobin  and hematocrit are listed below.  Recent Labs     01/14/25  0516 01/15/25  0500 01/16/25  0445   HGB 8.3* 7.2* 7.7*   HCT 28.3* 25.0* 26.5*       Plan  - Monitor serial CBC: Every 12 hours  - Transfuse PRBC if patient becomes hemodynamically unstable, symptomatic or H/H drops below 7/21.  - Patient has not received any PRBC transfusions to date  - Patient's anemia is currently stable  -   Likely sec to Hematuria/Possible GI Bleed from Eliquis/ASA sec to Prograf toxicity  H/h dropped from 11 to 9 to 8.7 now. Pt also getting IVF now- will know pilar value in am    Stable H/H    H/H improved to 7.7/24 despite IVF    Improved further to 7.7/26      VTE Risk Mitigation (From admission, onward)           Ordered     Reason for No Pharmacological VTE Prophylaxis  Once        Question:  Reasons:  Answer:  Active Bleeding    01/12/25 1729     IP VTE HIGH RISK PATIENT  Once         01/12/25 1729     Place sequential compression device  Until discontinued         01/12/25 1729                    Discharge Planning   GRETEL:      Code Status: Full Code   Medical Readiness for Discharge Date:   Discharge Plan A: Home with family, Home        Please place Justification for DME    Chidi James MD  Department of Hospital Medicine   [facility] - Telemetry ([facility])

## 2025-01-17 NOTE — PT/OT/SLP PROGRESS
Physical Therapy      Patient Name:  Mitch Whittaker   MRN:  [id number]    Patient not seen today secondary to Other (Comment). Multiple attempts made to see pt for PT evaluation. Pt asleep and resting at each attempt with only opening of eyes and then going back to sleep with attempts to wake. Will follow-up at next available time.

## 2025-01-17 NOTE — ASSESSMENT & PLAN NOTE
Hyperkalemia is likely due to Increased potassium intake.The patients most recent potassium results are listed below.  Recent Labs     01/14/25  0516 01/15/25  0500 01/16/25  0445   K 6.0* 4.3  4.3 5.0       Plan  - Monitor for arrhythmias with EKG and/or continuous telemetry.   - Treat the hyperkalemia with Potassium Binders, Calcium gluconate, IV insulin and dextrose, Furosemide, and Sodium Bicarbonate.   - Monitor potassium: Every 12 hours  - The patient's hyperkalemia is worsening. Will continue current treatment    Pt was reportedly drinking lots of OJ ast home for last 3 days  Nephrology on board    Sec to ANGELITO on CKD 4 plus excess dietary Intake- better but still high- started on Lokelma and hold K supplements    K still a little high- likely sec to Prograf toxicity  cont Lokelma, iVF    Improved, down to 4.3- cont IVF and Lokelma  Renal following    Resolved, Lokelma stopped by Renal  Cont IVF

## 2025-01-17 NOTE — PLAN OF CARE
Plan of care reviewed with patient with verbal understanding. Chart and orders reviewed.  Pt remains free from falls this shift, Safety precautions in place.   Pt currently resting comfortably in bed.   Pt is a turn q2h and on a bariatric bed.   Purposeful rounding with bed in lowest position, side rails up, call light in reach.  Will continue to monitor until end of shift.       Problem: Adult Inpatient Plan of Care  Goal: Plan of Care Review  Outcome: Progressing  Flowsheets (Taken 1/17/2025 0231)  Plan of Care Reviewed With: patient  Goal: Patient-Specific Goal (Individualized)  Outcome: Progressing  Flowsheets (Taken 1/17/2025 0231)  Individualized Care Needs: Pt is a turn q2h.  Anxieties, Fears or Concerns: Worried about dying  Patient/Family-Specific Goals (Include Timeframe): The pt will be hemodynamically stable by end of admission.     Problem: Bariatric Environmental Safety  Goal: Safety Maintained with Care  Outcome: Progressing     Problem: Wound  Goal: Optimal Coping  Outcome: Progressing     Problem: Skin Injury Risk Increased  Goal: Skin Health and Integrity  Outcome: Progressing

## 2025-01-17 NOTE — ASSESSMENT & PLAN NOTE
Prograf level very high  Prograf on hold    Await repeat Prograf level    Repeat levels normal- will restart Prograf from am at 3 mg bid

## 2025-01-17 NOTE — ASSESSMENT & PLAN NOTE
Stable, has Purewick catheter now  Getting IVF, if gets worse, may need CBI    Still persists but a little better  If no improvement tomorrow- will try CBI    Improving with good Urine output, CBI not needed  Cont close monitoring      Improving, cont IVF

## 2025-01-17 NOTE — PT/OT/SLP EVAL
"Occupational Therapy   Evaluation    Name: Mitch Whittaker  MRN: [id number]  Admitting Diagnosis: Hyperkalemia  Recent Surgery: * No surgery found *      Recommendations:     Discharge Recommendations: High Intensity Therapy  Discharge Equipment Recommendations:  to be determined by next level of care  Barriers to discharge:  Decreased caregiver support, Inaccessible home environment    Assessment:     Mitch Whittaker is a 69 y.o. male with a medical diagnosis of Hyperkalemia.  He presents with high motivation to return to independent prior level of function. Performance deficits affecting function: weakness, impaired endurance, impaired self care skills, impaired functional mobility, decreased coordination, decreased upper extremity function, decreased lower extremity function, pain, decreased ROM, edema, impaired cardiopulmonary response to activity.      Patient previously drove, ambulated independently with a walker, and completed ADLs independently prior to hospitalization 1/6/25. Due to BLE weakness and swelling, he has required increased assistance for mobility and self-care. He requires maximal assistance for rolling and minimal assistance for feeding with his non-dominant arm at this time. He demonstrates good motivation, pain tolerance, and endurance needed to participate in three hours of high intensity therapy.     Rehab Prognosis: Good; patient would benefit from acute skilled OT services to address these deficits and reach maximum level of function.       Plan:     Patient to be seen 2 x/week to address the above listed problems via self-care/home management, therapeutic activities, therapeutic exercises  Plan of Care Expires: 01/31/25  Plan of Care Reviewed with: patient    Subjective     Chief Complaint: "I want to get these arms and legs moving like they once were."  Patient/Family Comments/goals: To regain strength and functional independence.     Patient somnolent during interview and required " multiple prompts to remain awake. He aroused soon after during physical assessment and treatment.   Occupational Profile:  Living Environment: Lives with spouse and daughter in single story  Previous level of function: Prior to hospitalization 1/6/25, patient was ambulating household distance independently with RW. ADLs independently, except required min A for bathing.   Roles and Routines: Driving, retired  Equipment Used at Home: walker, rolling, shower chair  Assistance upon Discharge: Limited assistance from family.     Pain/Comfort:  Pain Rating 1: 0/10  Pain Rating 2: 7/10 (Per faces scale.)  Location - Side 2: Bilateral  Location - Orientation 2: generalized  Location 2:  (upper and lower extremities with movement.)  Pain Addressed 2:  (Activity pacing. Movement warranted to lubricate joints and promote less painful ROM. Pain decreased over time.)  Pain Rating Post-Intervention 2: 3/10    Patients cultural, spiritual, Tenriism conflicts given the current situation: no    Objective:     Communicated with: nurse prior to session.  Patient found HOB elevated with peripheral IV, PureWick, pressure relief boots, blood pressure cuff, SCD (bariatric bed) upon OT entry to room.    General Precautions: Standard, fall  Orthopedic Precautions:  N/A  Braces: N/A  Respiratory Status: Room air    Occupational Performance:    Bed Mobility:    Patient completed Rolling/Turning to Left with  maximal assistance x 3 trials using L UE to hold bedrail x 10 seconds to increase UB strength needed for bed mobility.    Functional Mobility/Transfers:  Patient unable to complete at this time d/t generalized weakness. Progressing.    Activities of Daily Living:  Feeding:  minimum assistance to lift R UE to mouth for sip of water. Dec coordination and ROM.   Toileting: total assistance and of 3 persons per nursing staff    Cognitive/Visual Perceptual:  Cognitive/Psychosocial Skills:     -       Oriented to: Person, Place, and Situation    -       Follows Commands/attention:Follows two-step commands  -       Safety awareness/insight to disability: intact   -       Mood/Affect/Coping skills/emotional control: Appropriate to situation, Tearful, and Pleasant    Physical Exam:  Balance:    -       unable to assess from bedlevel  Edema:  Severe BLE  Dominant hand:    -       Left   Upper Extremity Range of Motion:     -       Right Upper Extremity: sh flexion (0-40) deg AROM;  WFL AAROM  -       Left Upper Extremity: sh flexion 0 deg AROM; (0-40 deg AAROM)  Upper Extremity Strength:    -       Right Upper Extremity: 3/5  -       Left Upper Extremity:  2/5   Strength:    -       Right Upper Extremity: Fair  -       Left Upper Extremity: Fair  Gross motor coordination:   hand-eye coordination    AMPAC 6 Click ADL:  AMPAC Total Score: 9    Treatment & Education:  Patient educated on role of OT in acute care and POC. Patient difficult to rouse at first but woke up with movement.    Active assisted ROM to all extremities progressively reducing assist provided.   Rolling to L side to increase functional strength and progress bed mobility. 3 trials to pull and hold himself x 10 seconds.    Provided with HEP to perform in room: BLE hip and leg flexion, self-feeding, and abdominal crunches.     Patient left left sidelying with all lines intact, call button in reach, and CNA notified of assist level for feeding    GOALS:   Multidisciplinary Problems       Occupational Therapy Goals          Problem: Occupational Therapy    Goal Priority Disciplines Outcome Interventions   Occupational Therapy Goal     OT, PT/OT     Description: Goals to be met by: 1/31/25     Patient will increase functional independence with ADLs by performing:    UE Dressing with Minimal Assistance.  Sitting at edge of bed x15 minutes with Stand-by Assistance.  Rolling to Bilateral with Minimal Assistance.   Supine to sit with Minimal Assistance.                         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 RNA NEGATIVE 5/2015, 2021, 2022    History of colon polyps     HPTH (hyperparathyroidism)     Hyperlipidemia     Hypertension associated with stage 3 chronic kidney disease due to type 2 diabetes mellitus     LBBB (left bundle branch block) 12/20/2021    Morbid obesity with BMI of 45.0-49.9, adult     Nephrolithiasis 6/7/2013    PCO (posterior capsular opacification), left 03/04/2019    Proteinuria     resolved s/p transplant    S/P kidney transplant     Sleep apnea     Type 2 diabetes, uncontrolled, with retinopathy     Type II diabetes mellitus with renal manifestations          Past Surgical History:   Procedure Laterality Date    CARDIAC CATHETERIZATION  01/01/2008    normal coronary    CARPAL TUNNEL RELEASE Right 12/01/2023    Procedure: RELEASE, CARPAL TUNNEL;  Surgeon: Noel Almonte MD;  Location: [facility] OR;  Service: Orthopedics;  Laterality: Right;    CARPAL TUNNEL RELEASE Left 7/18/2024    Procedure: RELEASE, CARPAL TUNNEL;  Surgeon: Noel Almonte MD;  Location: [facility] OR;  Service: Orthopedics;  Laterality: Left;    COLONOSCOPY N/A  04/05/2018    Procedure: COLONOSCOPY;  Surgeon: Chava Ronquillo MD;  Location: [facility];  Service: Endoscopy;  Laterality: N/A;    COLONOSCOPY N/A 05/02/2022    Procedure: COLONOSCOPY;  Surgeon: Alix Puente MD;  Location: [facility];  Service: Endoscopy;  Laterality: N/A;    COLONOSCOPY N/A 06/07/2023    Procedure: COLONOSCOPY - rule out CMV  Cardiac clearance/Eliquis hold approval received on 05/21/23 per Dr. Meade, cardiology.  Note in encounters.  LB;  Surgeon: Daniella Shah MD;  Location: [facility];  Service: Endoscopy;  Laterality: N/A;    ESOPHAGOGASTRODUODENOSCOPY N/A 03/26/2024    Procedure: EGD (ESOPHAGOGASTRODUODENOSCOPY) 3/1-pt cleared, ok to hold eliquis for 3 days;  Surgeon: Daniella Shah MD;  Location: [facility];  Service: Endoscopy;  Laterality: N/A;    KIDNEY TRANSPLANT  2015    RETINAL LASER PROCEDURE         Time Tracking:     OT Date of Treatment: 01/17/25  OT Start Time: 0918  OT Stop Time: 0950  OT Total Time (min): 32 min    Billable Minutes:Evaluation 9  Self Care/Home Management 8  Therapeutic Exercise 15    1/17/2025

## 2025-01-17 NOTE — PROGRESS NOTES
Nephrology Progress Note:    HPI:   Mr. Whittaker is a 69 year  male with a hx of previous ESRD likely related to diabetes and hypertension that was on dialysis several years prior to receiving a living related kidney transplant from his daughter in 2015.  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  He is followed by Dr. Gonsalez of [facility] seen last on 09/24/2024 at which time his serum creatinine was 2.2.    He was seen in the emergency department on 01/06/2025 complaining of increasing lower extremity edema.  At that time his serum creatinine was 2.8.  He returns to the emergency department 01/12/2025 with persistent lower extremity edema and associated blistering.  He also complains of bilateral knee pain to the point where he is unable to ambulate.  He attributes this to the change in weather.  His admission laboratory reveals a serum creatinine now up to 4.3 with a potassium of 5.4.  Nephrology has been asked to see and evaluate the patient.    As an outpatient he is chronically on Lasix 40 mg twice a day.  He denies the use of nonsteroidal anti-inflammatory drugs.  He denies dysuria hematuria or difficulty voiding.  He is chronically on Entresto.    Interval History:   Chart reviewed/patient examined.  Patient had V-tach last night and appeared short of breath with a drop in hemoglobin to 7.7.  He was given 1 time dose of IV Lasix 40 mg and metoprolol IV 5 mg, appeared to be fluid overloaded.  He also received 1 unit of PRBCs overnight.  He currently denies nausea, vomiting, or shortness of breath. No family present.  His Prograf was restarted.    Health Status   Allergies:    is allergic to lisinopril, actos  [pioglitazone], and metformin.    Current  medications:     Current Facility-Administered Medications:     0.9%  NaCl infusion (for blood administration), , Intravenous, Q24H PRN, Carlos Mathew MD    acetaminophen tablet 650 mg, 650 mg, Oral, Q4H PRN, Jaymie Medley MD, 650 mg at 01/16/25 0019    dextrose 50% injection 12.5 g, 12.5 g, Intravenous, PRN, Jaymie Medley MD    dextrose 50% injection 25 g, 25 g, Intravenous, PRN, Jaymie Medley MD    glucagon (human recombinant) injection 1 mg, 1 mg, Intramuscular, PRN, Jaymie Medley MD    glucose chewable tablet 16 g, 16 g, Oral, PRN, Jaymie Medley MD    glucose chewable tablet 24 g, 24 g, Oral, PRN, Jaymie Medley MD    HYDROcodone-acetaminophen 5-325 mg per tablet 1 tablet, 1 tablet, Oral, Q6H PRN, Jaymie Medley MD, 1 tablet at 01/16/25 1758    insulin aspart U-100 pen 0-10 Units, 0-10 Units, Subcutaneous, QID (AC + HS) PRN, Jaymie Medley MD, 2 Units at 01/17/25 0601    insulin glargine U-100 (Lantus) pen 10 Units, 10 Units, Subcutaneous, BID, Jaymie Medley MD, 10 Units at 01/17/25 1005    melatonin tablet 6 mg, 6 mg, Oral, Nightly PRN, Jaymie Medley MD    metoprolol injection 5 mg, 5 mg, Intravenous, Q6H PRN, Dottie North, NP    metoprolol succinate (TOPROL-XL) 24 hr tablet 25 mg, 25 mg, Oral, Daily, Carlos Mathew MD, 25 mg at 01/17/25 1005    mycophenolate tablet 500 mg, 500 mg, Oral, BID, Jaymie Medley MD, 500 mg at 01/17/25 1004    naloxone 0.4 mg/mL injection 0.02 mg, 0.02 mg, Intravenous, PRN, Jaymie Medley MD    ondansetron disintegrating tablet 8 mg, 8 mg, Oral, Q8H PRN, Jaymie Medley MD    ondansetron injection 4 mg, 4 mg, Intravenous, Q8H PRN, Jaymie Medley MD    pantoprazole EC tablet 40 mg, 40 mg, Oral, BID, Hilario Dahl PA-C, 40 mg at 01/17/25 1005    polyethylene glycol packet 17 g, 17 g, Oral, Daily PRN, Jaymie Medley MD    senna-docusate 8.6-50 mg per tablet 2 tablet, 2 tablet, Oral, Daily PRN, Jaymie Medley MD    sodium chloride 0.9% flush  "10 mL, 10 mL, Intravenous, Q8H, Jaymie Medley MD, 10 mL at 01/16/25 2210    sodium zirconium cyclosilicate packet 10 g, 10 g, Oral, Daily, Yoandy Bennett MD, 10 g at 01/17/25 1005    tacrolimus capsule 3 mg, 3 mg, Oral, BID, Jaymie Medley MD, 3 mg at 01/17/25 1005         Objective       Physical Examination:   VS/Measurements   /65 (BP Location: Left arm, Patient Position: Lying)   Pulse 110   Temp 97.8 °F (36.6 °C) (Oral)   Resp 20   Ht 5' 7" (1.702 m)   Wt 130.2 kg (287 lb 0.6 oz)   SpO2 99%   BMI 44.96 kg/m²   Vitals:    01/17/25 0800   BP:    Pulse: 110   Resp:    Temp:      UOP = 1700 cc (last 24 hours)  General:   Obese, not in any distress.  Neck:  Supple, midline trachea  Respiratory: Non-labored,  Lungs are clear to auscultation.    Cardiovascular:  Tachycardic, Regular rhythm.    Abdomen:  Soft, Non-tender, Normal bowel sounds.  Allograft - nontender, no bruit  Muskuloskeletal:   pedal edema +  Vitals reviewed.    Laboratory Results   Today's Lab Results :    Recent Results (from the past 24 hours)   POCT glucose    Collection Time: 01/16/25 11:26 AM   Result Value Ref Range    POCT Glucose 201 (H) 70 - 110 mg/dL   POCT glucose    Collection Time: 01/16/25  5:22 PM   Result Value Ref Range    POCT Glucose 248 (H) 70 - 110 mg/dL   EKG 12-lead    Collection Time: 01/16/25  8:30 PM   Result Value Ref Range    QRS Duration 140 ms    [facility] QTC Calculation 514 ms   POCT glucose    Collection Time: 01/16/25 10:07 PM   Result Value Ref Range    POCT Glucose 162 (H) 70 - 110 mg/dL   Troponin I    Collection Time: 01/16/25 10:14 PM   Result Value Ref Range    Troponin I 0.046 (H) 0.000 - 0.026 ng/mL   Prepare RBC 1 Unit    Collection Time: 01/16/25 10:14 PM   Result Value Ref Range    UNIT NUMBER [id number]     Product Code [id number]     DISPENSE STATUS ISSUED     CODING SYSTEM [id number]     Unit Blood Type Code 7300     Unit Blood Type B POS     Unit Expiration [id number]     CROSSMATCH " INTERPRETATION Compatible    Type & Screen    Collection Time: 01/16/25 10:14 PM   Result Value Ref Range    Group & Rh B POS     Indirect Jimmy NEG     Specimen Outdate 01/19/2025 23:59    POCT glucose    Collection Time: 01/17/25  6:00 AM   Result Value Ref Range    POCT Glucose 172 (H) 70 - 110 mg/dL   Renal Function Panel    Collection Time: 01/17/25  6:59 AM   Result Value Ref Range    Glucose 179 (H) 70 - 110 mg/dL    Sodium 144 136 - 145 mmol/L    Potassium 4.8 3.5 - 5.1 mmol/L    Chloride 114 (H) 95 - 110 mmol/L    CO2 20 (L) 23 - 29 mmol/L    BUN 45 (H) 8 - 23 mg/dL    Calcium 9.6 8.7 - 10.5 mg/dL    Creatinine 2.3 (H) 0.5 - 1.4 mg/dL    Albumin 1.4 (L) 3.5 - 5.2 g/dL    Phosphorus 4.5 2.7 - 4.5 mg/dL    eGFR 30 (A) >60 mL/min/1.73 m^2    Anion Gap 10 8 - 16 mmol/L   Uric Acid    Collection Time: 01/17/25  6:59 AM   Result Value Ref Range    Uric Acid 9.2 (H) 3.4 - 7.0 mg/dL   CBC Auto Differential    Collection Time: 01/17/25  7:00 AM   Result Value Ref Range    WBC 2.85 (L) 3.90 - 12.70 K/uL    RBC 3.62 (L) 4.60 - 6.20 M/uL    Hemoglobin 8.8 (L) 14.0 - 18.0 g/dL    Hematocrit 29.9 (L) 40.0 - 54.0 %    MCV 83 82 - 98 fL    MCH 24.3 (L) 27.0 - 31.0 pg    MCHC 29.4 (L) 32.0 - 36.0 g/dL    RDW 14.3 11.5 - 14.5 %    Platelets 238 150 - 450 K/uL    MPV 10.0 9.2 - 12.9 fL    Immature Granulocytes CANCELED 0.0 - 0.5 %    Immature Grans (Abs) CANCELED 0.00 - 0.04 K/uL    Lymph # CANCELED 1.0 - 4.8 K/uL    Mono # CANCELED 0.3 - 1.0 K/uL    Eos # CANCELED 0.0 - 0.5 K/uL    Baso # CANCELED 0.00 - 0.20 K/uL    nRBC 0 0 /100 WBC    Gran % 58.0 38.0 - 73.0 %    Lymph % 16.0 (L) 18.0 - 48.0 %    Mono % 19.0 (H) 4.0 - 15.0 %    Eosinophil % 2.0 0.0 - 8.0 %    Basophil % 0.0 0.0 - 1.9 %    Bands 4.0 %    Metamyelocytes 1.0 %    Platelet Estimate Appears normal     Aniso Slight     Poik Slight     Ovalocytes Occasional     Tear Drop Cells Occasional     Schistocytes Present     Large/Giant Platelets Present      Differential Method Manual    PTH, intact    Collection Time: 01/17/25  9:09 AM   Result Value Ref Range    PTH, Intact 42.4 9.0 - 77.0 pg/mL       @[facility]@ @[facility]@    Assessment & Plan   Active Hospital Problems    Diagnosis  POA    *Hyperkalemia [E87.5]  Yes    Gross hematuria [R31.0]  Yes    Tacrolimus-induced GI toxicity [K63.89, T45.1X5A]  Yes    CKD (chronic kidney disease) stage 4, GFR 15-29 ml/min [N18.4]  Yes    ABL Anemia plus anemia of CKD 4 [N18.9, D63.1]  Yes      Resolved Hospital Problems    Diagnosis Date Resolved POA    Melena [K92.1] 01/16/2025 Yes       Assessment/Recommendations:      ANGELITO on CKD stage 4 secondary to 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 His creatinine is improving, is back to his baseline.  His kidney transplant ultrasound did not reveal any obstruction but revealed increase in the main renal artery velocities (we will need follow-up as an outpatient).      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  Creatinine currently 2.3.  His Prograf level came back therapeutic and was restarted on his Prograf.  Also on CellCept.    -Prograf level from yesterday 5.2, he was restarted on his Prograf yesterday at 3 mg b.i.d.     Hematuria secondary to possible Ann trauma.  His anticoagulants on hold.    Improving.     Lower extremity edema with skin changes consistent with venous stasis     Hyperkalemia.  His Entresto and the potassium supplements on hold.   Resolved.  Lokelma discontinued.    Hypercalcemia.  Corrected calcium 11.6, patient asymptomatic.  PTH within normal range.  We will check SPEP/UPEP.  Noted to be on calcitriol outpatient but is on hold here.  Avoid calcium and vitamin-D supplements.      Melena.   Resolved.  GI is following.    Anemia.  Likely secondary to hematuria/possible GI bleed from Eliquis/aspirin secondary to Prograf toxicity.  He received 1 unit of PRBCs overnight with  improvement of hemoglobin to 8.8.     Hypertension with renal disease.  His blood pressure is stable.     Diabetes with renal disease     CAD/CHF with diminished ejection fraction followed by Dr. Man of Cardiology.  His EF was 35% in 11/2024.          -Portions of this note were created using voice recognition software.  It is possible that there are errors, which have persisted, despite proofreading.  If there is a question regarding contents of this document, please contact me for clarification.

## 2025-01-17 NOTE — ASSESSMENT & PLAN NOTE
Anemia is likely due to acute blood loss which was from CKD, s/p Renal transplant and chronic disease due to Chronic Kidney Disease. Most recent hemoglobin and hematocrit are listed below.  Recent Labs     01/14/25  0516 01/15/25  0500 01/16/25  0445   HGB 8.3* 7.2* 7.7*   HCT 28.3* 25.0* 26.5*       Plan  - Monitor serial CBC: Every 12 hours  - Transfuse PRBC if patient becomes hemodynamically unstable, symptomatic or H/H drops below 7/21.  - Patient has not received any PRBC transfusions to date  - Patient's anemia is currently stable  -   Likely sec to Hematuria/Possible GI Bleed from Eliquis/ASA sec to Prograf toxicity  H/h dropped from 11 to 9 to 8.7 now. Pt also getting IVF now- will know pilar value in am    Stable H/H    H/H improved to 7.7/24 despite IVF    Improved further to 7.7/26

## 2025-01-17 NOTE — CONSULTS
SW met with patient and daughter, Hanane, at bedside and spouse joined conversation by phone. SW discussed PT/OT recs for placement. SW uncertain which LOC is most appropriate for patient d/t limited therapy evals. SW discussed rehab vs SNF and provided lists for family to review.  SW to follow.

## 2025-01-18 PROBLEM — R53.81 PHYSICAL DECONDITIONING: Status: ACTIVE | Noted: 2025-01-18

## 2025-01-18 LAB
ANION GAP SERPL CALC-SCNC: 10 MMOL/L (ref 8–16)
ANISOCYTOSIS BLD QL SMEAR: SLIGHT
BASOPHILS NFR BLD: 0 % (ref 0–1.9)
BUN SERPL-MCNC: 41 MG/DL (ref 8–23)
CALCIUM SERPL-MCNC: 9.6 MG/DL (ref 8.7–10.5)
CHLORIDE SERPL-SCNC: 111 MMOL/L (ref 95–110)
CO2 SERPL-SCNC: 21 MMOL/L (ref 23–29)
CREAT SERPL-MCNC: 2.1 MG/DL (ref 0.5–1.4)
DIFFERENTIAL METHOD BLD: ABNORMAL
EOSINOPHIL NFR BLD: 0 % (ref 0–8)
ERYTHROCYTE [DISTWIDTH] IN BLOOD BY AUTOMATED COUNT: 14.5 % (ref 11.5–14.5)
EST. GFR  (NO RACE VARIABLE): 33 ML/MIN/1.73 M^2
GLUCOSE SERPL-MCNC: 126 MG/DL (ref 70–110)
HCT VFR BLD AUTO: 28.7 % (ref 40–54)
HGB BLD-MCNC: 8.4 G/DL (ref 14–18)
IMM GRANULOCYTES # BLD AUTO: ABNORMAL K/UL (ref 0–0.04)
IMM GRANULOCYTES NFR BLD AUTO: ABNORMAL % (ref 0–0.5)
LYMPHOCYTES NFR BLD: 26 % (ref 18–48)
MCH RBC QN AUTO: 24.2 PG (ref 27–31)
MCHC RBC AUTO-ENTMCNC: 29.3 G/DL (ref 32–36)
MCV RBC AUTO: 83 FL (ref 82–98)
METAMYELOCYTES NFR BLD MANUAL: 2 %
MONOCYTES NFR BLD: 21 % (ref 4–15)
NEUTROPHILS NFR BLD: 51 % (ref 38–73)
NRBC BLD-RTO: 1 /100 WBC
OVALOCYTES BLD QL SMEAR: ABNORMAL
PLATELET # BLD AUTO: 250 K/UL (ref 150–450)
PMV BLD AUTO: 10.7 FL (ref 9.2–12.9)
POCT GLUCOSE: 125 MG/DL (ref 70–110)
POCT GLUCOSE: 125 MG/DL (ref 70–110)
POCT GLUCOSE: 179 MG/DL (ref 70–110)
POCT GLUCOSE: 179 MG/DL (ref 70–110)
POCT GLUCOSE: 188 MG/DL (ref 70–110)
POIKILOCYTOSIS BLD QL SMEAR: SLIGHT
POTASSIUM SERPL-SCNC: 4 MMOL/L (ref 3.5–5.1)
RBC # BLD AUTO: 3.47 M/UL (ref 4.6–6.2)
SODIUM SERPL-SCNC: 142 MMOL/L (ref 136–145)
TACROLIMUS BLD-MCNC: 6.1 NG/ML (ref 5–15)
WBC # BLD AUTO: 2.85 K/UL (ref 3.9–12.7)

## 2025-01-18 PROCEDURE — 80048 BASIC METABOLIC PNL TOTAL CA: CPT | Performed by: STUDENT IN AN ORGANIZED HEALTH CARE EDUCATION/TRAINING PROGRAM

## 2025-01-18 PROCEDURE — 85027 COMPLETE CBC AUTOMATED: CPT | Performed by: STUDENT IN AN ORGANIZED HEALTH CARE EDUCATION/TRAINING PROGRAM

## 2025-01-18 PROCEDURE — 63600175 PHARM REV CODE 636 W HCPCS: Performed by: EMERGENCY MEDICINE

## 2025-01-18 PROCEDURE — 25000003 PHARM REV CODE 250: Performed by: EMERGENCY MEDICINE

## 2025-01-18 PROCEDURE — 85007 BL SMEAR W/DIFF WBC COUNT: CPT | Performed by: STUDENT IN AN ORGANIZED HEALTH CARE EDUCATION/TRAINING PROGRAM

## 2025-01-18 PROCEDURE — 21400001 HC TELEMETRY ROOM

## 2025-01-18 PROCEDURE — 99232 SBSQ HOSP IP/OBS MODERATE 35: CPT | Mod: ,,, | Performed by: INTERNAL MEDICINE

## 2025-01-18 PROCEDURE — 25000003 PHARM REV CODE 250: Performed by: PHYSICIAN ASSISTANT

## 2025-01-18 PROCEDURE — 97162 PT EVAL MOD COMPLEX 30 MIN: CPT

## 2025-01-18 PROCEDURE — 25000003 PHARM REV CODE 250: Performed by: STUDENT IN AN ORGANIZED HEALTH CARE EDUCATION/TRAINING PROGRAM

## 2025-01-18 PROCEDURE — 92610 EVALUATE SWALLOWING FUNCTION: CPT

## 2025-01-18 PROCEDURE — 36415 COLL VENOUS BLD VENIPUNCTURE: CPT | Performed by: STUDENT IN AN ORGANIZED HEALTH CARE EDUCATION/TRAINING PROGRAM

## 2025-01-18 PROCEDURE — 25000003 PHARM REV CODE 250: Performed by: HOSPITALIST

## 2025-01-18 PROCEDURE — A4216 STERILE WATER/SALINE, 10 ML: HCPCS | Performed by: EMERGENCY MEDICINE

## 2025-01-18 RX ADMIN — INSULIN GLARGINE 10 UNITS: 100 INJECTION, SOLUTION SUBCUTANEOUS at 08:01

## 2025-01-18 RX ADMIN — TACROLIMUS 3 MG: 1 CAPSULE ORAL at 08:01

## 2025-01-18 RX ADMIN — MYCOPHENOLATE MOFETIL 500 MG: 500 TABLET, FILM COATED ORAL at 08:01

## 2025-01-18 RX ADMIN — TACROLIMUS 3 MG: 1 CAPSULE ORAL at 05:01

## 2025-01-18 RX ADMIN — METOPROLOL SUCCINATE 25 MG: 25 TABLET, FILM COATED, EXTENDED RELEASE ORAL at 09:01

## 2025-01-18 RX ADMIN — NYSTATIN 500000 UNITS: 100000 SUSPENSION ORAL at 08:01

## 2025-01-18 RX ADMIN — MYCOPHENOLATE MOFETIL 500 MG: 500 TABLET, FILM COATED ORAL at 09:01

## 2025-01-18 RX ADMIN — Medication 10 ML: at 01:01

## 2025-01-18 RX ADMIN — NYSTATIN 500000 UNITS: 100000 SUSPENSION ORAL at 04:01

## 2025-01-18 RX ADMIN — NYSTATIN 500000 UNITS: 100000 SUSPENSION ORAL at 01:01

## 2025-01-18 RX ADMIN — NYSTATIN 500000 UNITS: 100000 SUSPENSION ORAL at 09:01

## 2025-01-18 RX ADMIN — Medication 10 ML: at 10:01

## 2025-01-18 RX ADMIN — INSULIN ASPART 2 UNITS: 100 INJECTION, SOLUTION INTRAVENOUS; SUBCUTANEOUS at 11:01

## 2025-01-18 RX ADMIN — INSULIN GLARGINE 10 UNITS: 100 INJECTION, SOLUTION SUBCUTANEOUS at 09:01

## 2025-01-18 RX ADMIN — INSULIN ASPART 2 UNITS: 100 INJECTION, SOLUTION INTRAVENOUS; SUBCUTANEOUS at 08:01

## 2025-01-18 RX ADMIN — PANTOPRAZOLE SODIUM 40 MG: 40 TABLET, DELAYED RELEASE ORAL at 09:01

## 2025-01-18 RX ADMIN — HYDROCODONE BITARTRATE AND ACETAMINOPHEN 1 TABLET: 5; 325 TABLET ORAL at 11:01

## 2025-01-18 RX ADMIN — Medication 10 ML: at 06:01

## 2025-01-18 RX ADMIN — INSULIN ASPART 2 UNITS: 100 INJECTION, SOLUTION INTRAVENOUS; SUBCUTANEOUS at 04:01

## 2025-01-18 RX ADMIN — PANTOPRAZOLE SODIUM 40 MG: 40 TABLET, DELAYED RELEASE ORAL at 08:01

## 2025-01-18 NOTE — PROGRESS NOTES
[facility] Medicine  Progress Note    Patient Name: Mitch Whittaker  MRN: [id number]  Patient Class: IP- Inpatient   Admission Date: 1/12/2025  Length of Stay: 6 days  Attending Physician: Chidi James MD  Primary Care Provider: Valery Caal MD        Subjective     Principal Problem:Hyperkalemia        HPI:  Mitch Whittaker is a 69 y.o. male patient with a PMHx of CHF- EF 40%, anemia, hyperparathyroidism, type 2 DM, s/p kidney transplant 2015 on Prograf and Cellcept, DVT on Eliquis, MARION, HLD, HTN, CKD, Hep C, and arthritis who presents to the Emergency Department for evaluation of nausea vomiting and diarrhea over the past several days (both of which have improved), but felt weak and couldn't move around like normal. no abd pain, no systemic symptoms, stool now formed. Pt is a very poor historian. According to nursing staff, family reported that the pt has been sitting in his chair for the last 3 days. Pt reports that he has had two episodes of diarrhea since yesterday, but due to the increasing weakness in his knees he was unable to get up from his chair. Pt normally ambulates with assistance from a walker. Symptoms are constant and moderate in severity. Associated sxs include blood in stool (x4 days) and n/v yesterday, but none today after PO. Patient denies any fever, abdominal pain, appetite changes, SOB, dysuria, and all other sxs at this time. No prior tx. Pt is on Eliquis. No further complaints or concerns at this time.   In the ER, VS /65, , Sats 100% on RA, Afeb, WBC 3.7, H/H 9.5/33, Bun/Cr 82/4.3, K 6.7- treated aggressively in the ER and rechecked and repeat 5.4. He is heme positive as well. C Diff Neg. She is being admitted for possible Hyperkalemia, acute GI Bleed, ANGELITO.     Overview/Hospital Course:  69 y.o. male patient with a PMHx of CHF- EF 40%, anemia, hyperparathyroidism, type 2 DM, s/p kidney transplant 2015 on Prograf and Cellcept, DVT on  Eliquis, MARION, HLD, HTN, CKD, Hep C, and arthritis who presents to the Emergency Department for evaluation of nausea vomiting and diarrhea over the past several days (both of which have improved), but felt weak and couldn't move around like normal. no abd pain, no systemic symptoms, stool now formed. Pt is a very poor historian. According to nursing staff, family reported that the pt has been sitting in his chair for the last 3 days. Pt reports that he has had two episodes of diarrhea since yesterday, but due to the increasing weakness in his knees he was unable to get up from his chair. Pt normally ambulates with assistance from a walker. Symptoms are constant and moderate in severity. Associated sxs include blood in stool (x4 days) and n/v yesterday, but none today after PO. Patient denies any fever, abdominal pain, appetite changes, SOB, dysuria, and all other sxs at this time. No prior tx. Pt is on Eliquis. No further complaints or concerns at this time.   In the ER, VS /65, , Sats 100% on RA, Afeb, WBC 3.7, H/H 9.5/33, Bun/Cr 82/4.3, K 6.7- treated aggressively in the ER and rechecked and repeat 5.4. He is heme positive as well. C Diff Neg. She is being admitted for possible Hyperkalemia, acute GI Bleed, ANGELITO.     1/13- Appreciate Renal and GI input- pt looks and feels a little better, stronger, more alert and responsive. Wife and daughter are here. His prograf level very high- 32- hence Held. It seems that he was getting Prograf toxicity at home which then resulted in Hematuria, ABL Anemia, ANGELITO and Hyperkalemia. Pt also felt weak and low sugars, so drank a lot of OJ which further raised his K level. Does not appears to have true GI bleed. But has hematuria- hence Purewick catheter placed and Dr. Bennett also started him on IVF- hematuria appears to be clearing. Pt feeling better, will check labs in am and start PT/OT. K was 6.1 this am- started on lokelma.     1/14- looks a little better, no melena  "but still has hematuria. H/h stable, 8.3/28, K still 6, Bun.Cr still high 83/4. Repeat Prograf level pending. VSS Afeb, he is more alert and talking. Wound care wrapped his legs with moderate compression. Had mod R SFA stenosis, vascular evaluated him and will continue to monitor him, no surgery or angioplasty needed at this time. Will start PT/OT in am. Cont IVF, If Hematuria persists, start CBI in am.     1/15- looks better but c/o pain in his legs which are in a compression socks. Good response to iVF, Hematuria getting better and Cr down to 3 now with good urine output. H/H dropped to 7.2/22 sec to IVF and Hematuria. Still await repeat prograf level. Getting PT/OT, started on Norco for pain control.      1/16- resting quietly, comfortable, making good amount of urine, hematuria almost cleared with IVF. Bun/Cr down to 58/2.9. K normal, lokelm d/koki. H/H improved to 7.7/26. Prograf noiw 5.2, will restart at 3 mg bid. Stop hydration in am, start PT/OT. D/c home soon.     01/17/2025  Patient requiring max assists with bed transitions and feeding. High intensity therapy recommended by therapists. Patient previously independent, living at home. Referrals for rehab placed at this time.     01/18/2025  Some notable objective clinical improvement. Reports decreased ROM in UE bilaterally but predominantly his right UE. Radiography of the left hand and R shoulder unremarkable. Will obtain CT cervical spine to further assess for stenosis.     Interval history:  Right shoulder pain that limits him from using his right arm.     Objective:   BP (!) 145/67 (Patient Position: Lying)   Pulse 108   Temp 98 °F (36.7 °C) (Oral)   Resp 18   Ht 5' 7" (1.702 m)   Wt 130.2 kg (287 lb 0.6 oz)   SpO2 96%   BMI 44.96 kg/m²     Intake/Output Summary (Last 24 hours) at 1/18/2025 1220  Last data filed at 1/18/2025 0800  Gross per 24 hour   Intake 450 ml   Output 650 ml   Net -200 ml       PHYSICAL EXAM  Vitals reviewed  Constitutional: "       General: He is not in acute distress.     Appearance: Normal appearance. He is well-developed. He is obese. He is ill-appearing. He is not toxic-appearing or diaphoretic.   Cardiovascular:      Rate and Rhythm: Regular rhythm. Tachycardia present.      Heart sounds: Normal heart sounds. No murmur heard.  Pulmonary:      Effort: Pulmonary effort is normal. No respiratory distress.      Breath sounds: Normal breath sounds. No wheezing.   Abdominal:      General: Bowel sounds are normal. There is no distension.      Palpations: Abdomen is soft. There is no mass.      Tenderness: There is no abdominal tenderness.   Musculoskeletal:         General: Swelling present.      Cervical back: Normal range of motion and neck supple.      Right lower leg: Edema present.      Left lower leg: Edema present.   Skin:     General: Skin is warm and dry.      Capillary Refill: Capillary refill takes 2 to 3 seconds.   Neurological:      General: No focal deficit present.      Mental Status: He is alert and oriented to person, place, and time.      Cranial Nerves: No cranial nerve deficit.      Motor: Weakness present.   Psychiatric:         Mood and Affect: Mood normal.         Behavior: Behavior normal.         Thought Content: Thought content normal.         Judgment: Judgment normal.     LABS  All labs from the past 24 hours were reviewed.     BMP:   Recent Labs   Lab 01/18/25  0526   *      K 4.0   *   CO2 21*   BUN 41*   CREATININE 2.1*   CALCIUM 9.6     CBC:   Recent Labs   Lab 01/17/25  0700 01/17/25  1207 01/18/25  0526   WBC 2.85* 3.03* 2.85*   HGB 8.8* 8.7* 8.4*   HCT 29.9* 29.1* 28.7*    245 250     CMP:   Recent Labs   Lab 01/17/25  0659 01/17/25  1207 01/18/25  0526    142 142   K 4.8 4.5 4.0   * 111* 111*   CO2 20* 21* 21*   * 170* 126*   BUN 45* 43* 41*   CREATININE 2.3* 2.2* 2.1*   CALCIUM 9.6 9.6 9.6   ALBUMIN 1.4*  --   --    ANIONGAP 10 10 10     Cardiac Markers: No  "results for input(s): "CKMB", "MYOGLOBIN", "BNP", "TROPISTAT" in the last 48 hours.  Coagulation: No results for input(s): "PT", "INR", "APTT" in the last 48 hours.  Lactic Acid: No results for input(s): "LACTATE" in the last 48 hours.  Magnesium: No results for input(s): "MG" in the last 48 hours.  Troponin:   Recent Labs   Lab 01/16/25  2214   TROPONINI 0.046*     TSH:   No results for input(s): "TSH" in the last 4320 hours.  Urine Studies:   No results for input(s): "COLORU", "APPEARANCEUA", "PHUR", "SPECGRAV", "PROTEINUA", "GLUCUA", "KETONESU", "BILIRUBINUA", "OCCULTUA", "NITRITE", "UROBILINOGEN", "LEUKOCYTESUR", "RBCUA", "WBCUA", "BACTERIA", "SQUAMEPITHEL", "HYALINECASTS" in the last 48 hours.    Invalid input(s): "WRIGHTSUR"    IMAGING  All imaging from the past 24 hours were reviewed.     Imaging Results              US Transplant Kidney With Doppler (Final result)  Result time 01/12/25 14:00:36   Procedure changed from US Retroperitoneal Complete     Final result by Joe Leach MD (01/12/25 14:00:36)                   Impression:      1.  Interval increase in main transplant renal artery velocities, currently measuring 170 centimeters/second (previously measuring 146 centimeters/second).  There is also interval increase in range of resistive indices in the segmental arteries, ranging between 0.82 and 0.88 (previously measuring 0.73-0.76).  This can be seen with infectious or inflammatory process, or early rejection changes.  Negative for hydronephrosis.  Negative for renal lesions.      Electronically signed by: Joe Leach MD  Date:    01/12/2025  Time:    14:00               Narrative:    EXAMINATION:  US TRANSPLANT KIDNEY WITH DOPPLER    CLINICAL HISTORY:  ANGELITO in a transplant;    TECHNIQUE:  Transplant renal ultrasound of the right lower quadrant with color flow doppler and duplex analysis.    COMPARISON:  December 6, 2021    FINDINGS:  A transplanted kidney measures 9.5 centimeters in length.  " Normal perfusion.  There is no hydronephrosis.No fluid collections.    Resistive indices ranged from 0.82 to 0.88.  Velocity in main renal artery is 170 cm/sec and the renal artery/iliac ratio is 0.9.  The main renal vein is patent.    The urinary bladder is contracted and thick walled.                                       X-Ray Chest AP Portable (Final result)  Result time 01/12/25 07:00:01      Final result by Joe Leach MD (01/12/25 07:00:01)                   Impression:      1.  Negative for acute process involving the chest.    2.  Stable findings as noted above.      Electronically signed by: Joe Leach MD  Date:    01/12/2025  Time:    07:00               Narrative:    EXAMINATION:  XR CHEST AP PORTABLE    CLINICAL HISTORY:  Weakness    COMPARISON:  August 22, 2023    FINDINGS:  The study is lordotic in position.  Chronically low lung volumes with elevation of the right hemidiaphragm.  The lungs are free of new pulmonary opacity.  The cardiac silhouette size is borderline enlarged.  The trachea is midline and the mediastinal width is normal. Negative for focal infiltrate, effusion or pneumothorax. Pulmonary vasculature is normal. Negative for osseous abnormalities. Convex right curvature of the thoracic spine with marginal spondylosis.  Tortuous aorta with calcifications of the aortic knob.  Stable vascular stent in the right axilla.                                       X-Ray Abdomen Flat And Erect (Final result)  Result time 01/12/25 06:56:43      Final result by Joe Leach MD (01/12/25 06:56:43)                   Impression:      1.  Negative for acute process.      Electronically signed by: Joe Leach MD  Date:    01/12/2025  Time:    06:56               Narrative:    EXAMINATION:  XR ABDOMEN FLAT AND ERECT    CLINICAL HISTORY:  Vomiting, unspecified    TECHNIQUE:  Flat and erect AP views of the abdomen were performed.    COMPARISON:  Abdomen and pelvis CT scan from April 14,  2024    FINDINGS:  Normal bowel gas pattern.  Negative for free air.    Stable degenerative changes of the spine and pelvis.  Lung bases are clear.                                        Assessment and Plan     * Hyperkalemia  Hyperkalemia is likely due to Increased potassium intake.The patients most recent potassium results are listed below.  Recent Labs     01/14/25  0516 01/15/25  0500 01/16/25  0445   K 6.0* 4.3  4.3 5.0       Plan  - Monitor for arrhythmias with EKG and/or continuous telemetry.   - Treat the hyperkalemia with Potassium Binders, Calcium gluconate, IV insulin and dextrose, Furosemide, and Sodium Bicarbonate.   - Monitor potassium: Every 12 hours  - The patient's hyperkalemia is worsening. Will continue current treatment    Pt was reportedly drinking lots of OJ ast home for last 3 days  Nephrology on board    Sec to ANGELITO on CKD 4 plus excess dietary Intake- better but still high- started on Lokelma and hold K supplements    K still a little high- likely sec to Prograf toxicity  cont Lokelma, iVF    Improved, down to 4.3- cont IVF and Lokelma  Renal following    Resolved, Lokelma stopped by Renal  Cont IVF    Tacrolimus-induced GI toxicity  Prograf level very high  Prograf on hold    Await repeat Prograf level    Repeat levels normal- will restart Prograf from am at 3 mg bid      Gross hematuria  Stable, has Purewick catheter now  Getting IVF, if gets worse, may need CBI    Still persists but a little better  If no improvement tomorrow- will try CBI    Improving with good Urine output, CBI not needed  Cont close monitoring      Improving, cont IVF    CKD (chronic kidney disease) stage 4, GFR 15-29 ml/min  Creatine stable for now. BMP reviewed- noted Estimated Creatinine Clearance: 30.3 mL/min (A) (based on SCr of 2.9 mg/dL (H)). according to latest data. Based on current GFR, CKD stage is stage 4 - GFR 15-29.  Monitor UOP and serial BMP and adjust therapy as needed. Renally dose meds. Avoid  nephrotoxic medications and procedures.  Pt is d/p Renal Transplant in 2015, on Prograf and Cellcept    Edwina on CKD 4- sec to Prograf toxicity- Bleeding- started on IVF    Cont IVF    Improving with IVF, Cr coming down to 3    ABL Anemia plus anemia of CKD 4      Anemia is likely due to acute blood loss which was from CKD, s/p Renal transplant and chronic disease due to Chronic Kidney Disease. Most recent hemoglobin and hematocrit are listed below.  Recent Labs     01/14/25  0516 01/15/25  0500 01/16/25  0445   HGB 8.3* 7.2* 7.7*   HCT 28.3* 25.0* 26.5*       Plan  - Monitor serial CBC: Every 12 hours  - Transfuse PRBC if patient becomes hemodynamically unstable, symptomatic or H/H drops below 7/21.  - Patient has not received any PRBC transfusions to date  - Patient's anemia is currently stable  -   Likely sec to Hematuria/Possible GI Bleed from Eliquis/ASA sec to Prograf toxicity  H/h dropped from 11 to 9 to 8.7 now. Pt also getting IVF now- will know pilar value in am    Stable H/H    H/H improved to 7.7/24 despite IVF    Improved further to 7.7/26      VTE Risk Mitigation (From admission, onward)           Ordered     Reason for No Pharmacological VTE Prophylaxis  Once        Question:  Reasons:  Answer:  Active Bleeding    01/12/25 1729     IP VTE HIGH RISK PATIENT  Once         01/12/25 1729     Place sequential compression device  Until discontinued         01/12/25 1729                    Discharge Planning   GRETEL:      Code Status: Full Code   Medical Readiness for Discharge Date:   Discharge Plan A: Rehab       Please place Justification for DME    Chidi James MD  Department of Hospital Medicine   [facility] - Telemetry ([facility])

## 2025-01-18 NOTE — PT/OT/SLP EVAL
Speech Language Pathology Evaluation  Bedside Swallow    Patient Name:  Mitch Whittaker   MRN:  [id number]  Admitting Diagnosis: Hyperkalemia    Recommendations:                 General Recommendations:  Follow-up not indicated  Diet recommendations:  Minced & Moist Diet - IDDSI Level 5, Thin liquids - IDDSI Level 0   Aspiration Precautions: Alternating bites/sips, HOB to 90 degrees, Meds whole buried in puree, Remain upright 30 minutes post meal, Small bites/sips, and Standard aspiration precautions   General Precautions: Standard,    Communication strategies:  provide increased time to answer    Assessment:     Mitch Whittaker is a 69 y.o. male with an admitting diagnosis of hyperkalemia. Patient with c/o sore throat. However, he denied any difficulty swallowing with intake of breakfast. He tolerated po trials without any overt s/s of aspiration and no further ST is warranted at this time. 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 RNA NEGATIVE 5/2015, 2021, 2022    History of colon polyps     HPTH (hyperparathyroidism)     Hyperlipidemia     Hypertension associated with stage 3 chronic kidney disease due to type 2 diabetes mellitus     LBBB (left bundle branch  block) 12/20/2021    Morbid obesity with BMI of 45.0-49.9, adult     Nephrolithiasis 6/7/2013    PCO (posterior capsular opacification), left 03/04/2019    Proteinuria     resolved s/p transplant    S/P kidney transplant     Sleep apnea     Type 2 diabetes, uncontrolled, with retinopathy     Type II diabetes mellitus with renal manifestations        Past Surgical History:   Procedure Laterality Date    CARDIAC CATHETERIZATION  01/01/2008    normal coronary    CARPAL TUNNEL RELEASE Right 12/01/2023    Procedure: RELEASE, CARPAL TUNNEL;  Surgeon: Noel Almonte MD;  Location: [facility] OR;  Service: Orthopedics;  Laterality: Right;    CARPAL TUNNEL RELEASE Left 7/18/2024    Procedure: RELEASE, CARPAL TUNNEL;  Surgeon: Noel Almonte MD;  Location: [facility] OR;  Service: Orthopedics;  Laterality: Left;    COLONOSCOPY N/A 04/05/2018    Procedure: COLONOSCOPY;  Surgeon: Chava Ronquillo MD;  Location: [facility] ENDO;  Service: Endoscopy;  Laterality: N/A;    COLONOSCOPY N/A 05/02/2022    Procedure: COLONOSCOPY;  Surgeon: Alix Puente MD;  Location: [facility] ENDO;  Service: Endoscopy;  Laterality: N/A;    COLONOSCOPY N/A 06/07/2023    Procedure: COLONOSCOPY - rule out CMV  Cardiac clearance/Eliquis hold approval received on 05/21/23 per Dr. Meade, cardiology.  Note in encounters.  LB;  Surgeon: Daniella Shah MD;  Location: [facility];  Service: Endoscopy;  Laterality: N/A;    ESOPHAGOGASTRODUODENOSCOPY N/A 03/26/2024    Procedure: EGD (ESOPHAGOGASTRODUODENOSCOPY) 3/1-pt cleared, ok to hold eliquis for 3 days;  Surgeon: Daniella Shah MD;  Location: [facility];  Service: Endoscopy;  Laterality: N/A;    KIDNEY TRANSPLANT  2015    RETINAL LASER PROCEDURE         Chest X-Rays: Narrative & Impression  EXAMINATION:  XR CHEST 1 VIEW     CLINICAL HISTORY:  SOB;     TECHNIQUE:  Single frontal portable view of the chest was performed.     COMPARISON:  None     FINDINGS:  Lungs are underinflated without sizable  consolidation or pleural effusion     Impression:     Under inflation is        Electronically signed by:Che Hopson  Date:                                            01/16/2025  Time:                                           22:45    Prior diet: unknown    Subjective     Patient alert, cooperative and seen at bedside. He denied any difficulty swallowing despite c/o sore throat.  Patient goals: n/a     Pain/Comfort:  Pain Rating 1: 0/10  Pain Rating Post-Intervention 1: 0/10    Respiratory Status: Room air    Objective:     Oral Musculature Evaluation  Oral Musculature: WFL  Dentition: present and adequate      Assessment:  Clinical Swallow Examination:   Of note, patient was fed by SLP throughout evaluation. Patient presented with:     CONSISTENCY  NOTES   THIN (IDDSI 0) Thin via straw   No overt s/s   PUREE (IDDSI 4/Extremely Thick)   TSP bites of pudding No overt s/s   SOLID (IDDSI 5/minced moist) Bite of sausage giorgio x 3   Extended oral prep, oral residue requiring liquid wash     Thickened liquids were not used in this assessment. Thuanfe (2018) reported that thickened liquids have no sound evidence at reducing the risk of pneumonia in patients with dysphagia and can cause harm by increasing their risk of dehydration. It also presents an increased risk of UTI, electrolyte imbalance, constipation, fecal impaction, cognitive impairment, functional decline and even death ([place], 2002; [place], 2016).  Thickened liquids are associated with risks including dehydration, increased pharyngeal residue, potential interference with medication absorption, and decreased quality of life (Dylan, 2013). Thickened liquids are also more likely to be silently aspirated than thin liquids (Misha et al., 2018). This supports the assertion that we should confirm a patient requires thickened liquids with an instrumental swallow study prior to recommending them.    References:   Dylan BASURTO (2013). Thickening agents  used for dysphagia management: Effect on bioavailability of water, medication and feelings of satiety. Nutrition Journal, 12, 54. https://doi.org/10.1186/3690-6281-23-54    SB Cabral, VANITA Cheney, JESSY Middleton, & SB Piña (2018). Cough response to aspiration in thin and thick fluids during FEES in hospitalized inpatients. International journal of language & communication disorders, 53(5), 909-918. https://doi.org/10.1111/1734-8842.34866    INTERPRETATION AND RISK ASSESSMENT:  Clinical swallow evaluation (CSE) revealed oral phase characterized by lingual, labial, and buccal strength and range of motion adequate for lip closure, bolus preparation and propulsion. The patient had no anterior loss of the bolus with appropriate closure of the lips around the utensil. Mild residue remained in the oral cavity following the swallow with spontaneous initiation of liquid wash to clear. Patient without overt clinical signs/symptoms of aspiration on any PO trials given. Recommend continue current diet with assistance for meals.      Goals:   Multidisciplinary Problems       SLP Goals       Not on file                    Plan:     Patient to be seen:      Plan of Care expires:     Plan of Care reviewed with:  patient   SLP Follow-Up:  No       Discharge recommendations:  Low Intensity Therapy     Time Tracking:     SLP Treatment Date:   01/18/25  Speech Start Time:  0823  Speech Stop Time:  0841     Speech Total Time (min):  18 min    Billable Minutes: Eval Swallow and Oral Function 15    01/18/2025

## 2025-01-18 NOTE — PT/OT/SLP EVAL
"Physical Therapy Evaluation    Patient Name:  Mitch Whittaker   MRN:  [id number]    Recommendations:     Discharge Recommendations: High Intensity Therapy (vs SNF pending participation and progress)   Discharge Equipment Recommendations: to be determined by next level of care   Barriers to discharge: None    Assessment:     Mitch Whittaker is a 69 y.o. male admitted with a medical diagnosis of Physical deconditioning.  He presents with the following impairments/functional limitations: weakness, impaired cognition, impaired endurance, decreased upper extremity function, decreased lower extremity function, impaired functional mobility, gait instability, decreased safety awareness, impaired balance, pain, impaired cardiopulmonary response to activity.      Patient presents with good participation and motivation to return to prior level of function with high intensity therapy. The patient demonstrates appropriate endurance, strength, and pain control required to participate in up to 3 hours of combined therapy per day     Rehab Prognosis: Good and Fair; patient would benefit from acute skilled PT services to address these deficits and reach maximum level of function.    Recent Surgery: * No surgery found *      Plan:     During this hospitalization, patient to be seen 3 x/week to address the identified rehab impairments via gait training, therapeutic activities, therapeutic exercises, neuromuscular re-education and progress toward the following goals:    Plan of Care Expires:  02/01/25    Subjective     Chief Complaint: pain and weakness   Patient/Family Comments/goals: "to get my arms and legs to work again"   Pain/Comfort:  Pain Rating 1: 6/10  Location - Side 1:  ("everywhere")  Pain Addressed 1: Distraction, Nurse notified, Cessation of Activity  Pain Rating Post-Intervention 1: 6/10    Patients cultural, spiritual, Christian conflicts given the current situation: no    Living Environment:  Pt lives with wife and " "daughter in single story home with no FALLON  Prior to admission, patients level of function was reported to be independent with occasional use of RW.  Still driving and still working. Equipment used at home: walker, rolling, shower chair.  DME owned (not currently used): none.  Upon discharge, patient will have assistance from wife and daughter.    Objective:     Communicated with Aleksey ritter  prior to session.  Patient found supine with peripheral IV, PureWick, pressure relief boots, SCD  upon PT entry to room.    General Precautions: Standard, fall  Orthopedic Precautions:N/A   Braces: N/A  Respiratory Status: Room air    Exams:  Cognitive Exam:  Patient is oriented to Person, Place, and Time  RLE ROM: WFL  RLE Strength: grossly 3-/5  LLE ROM: WFL  LLE Strength: grossly 3-/5    Functional Mobility:  Bed Mobility:     Rolling Left:  moderate assistance  Rolling Right: moderate assistance  Scooting: maximal assistance      AM-PAC 6 CLICK MOBILITY  Total Score:8       Treatment & Education:  Evaluation performed. Pt required mod A for bed mobility rolling in the bed. Pt with max cues required for rolling for positioning and reaching for railing to assist with rolling. PT offered sitting EOB and attempting transfer, however, pt reported that he soiled himself and that he was needed to be changed. PT offered to assist in changing pt however, he declined stating that he also needed pain medication d/t having pain. Session terminated and evaluation terminated at that time. Pt in need of further OOB assessment.     Pt educated on role of PT in acute care and POC. Educated on importance of OOB activities, activity pacing, and HEP (marching/hip flex, hip abd, heel slides/LAQ, quad sets, ankle pumps) in order to maintain/regain strength. Encouraged to sit up in chair for all meals. Educated on proper use of RW for safety and to reduce risk of falling. Educated on "call don't fall" policy and increased risk of falling due to " weakness, instructed to utilize call bell for assistance with all transfers. Pt agreeable to all requests.      Patient left supine with all lines intact, call button in reach, RN and CNA notified, and wife present.    GOALS:   Multidisciplinary Problems       Physical Therapy Goals          Problem: Physical Therapy    Goal Priority Disciplines Outcome Interventions   Physical Therapy Goal     PT, PT/OT Progressing    Description: All LTGs to be met by 2/1/25    Bed mobility Sba   Transfers SBA   Gait of at least 100 feet SBA   Pt will increase AMPAC score by 2 points to progress functional mobility  Pt will tolerate > 10 min of functional activity to progress gross functional mobility                        DME Justifications:  No DME recommended requiring DME justifications    History:     Past Medical History:   Diagnosis Date    Acquired renal cyst of left kidney     Anemia associated with chronic renal failure     CAD (coronary artery disease)     nonobstructive lhc 9/14    CHF (congestive heart failure)     Chronic immunosuppression with Prograf and MMF 06/18/2015    Chronic venous insufficiency of lower extremity     CKD (chronic kidney disease) stage 3, GFR 30-59 ml/min     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 RNA NEGATIVE 5/2015, 2021, 2022    History of colon polyps     HPTH (hyperparathyroidism)     Hyperlipidemia     Hypertension associated with stage 3 chronic kidney disease due to type 2 diabetes mellitus     LBBB (left bundle branch block) 12/20/2021    Morbid obesity with BMI of 45.0-49.9, adult     Nephrolithiasis 6/7/2013    PCO (posterior capsular  opacification), left 03/04/2019    Proteinuria     resolved s/p transplant    S/P kidney transplant     Sleep apnea     Type 2 diabetes, uncontrolled, with retinopathy     Type II diabetes mellitus with renal manifestations        Past Surgical History:   Procedure Laterality Date    CARDIAC CATHETERIZATION  01/01/2008    normal coronary    CARPAL TUNNEL RELEASE Right 12/01/2023    Procedure: RELEASE, CARPAL TUNNEL;  Surgeon: Noel Almonte MD;  Location: [facility] OR;  Service: Orthopedics;  Laterality: Right;    CARPAL TUNNEL RELEASE Left 7/18/2024    Procedure: RELEASE, CARPAL TUNNEL;  Surgeon: Noel Almonte MD;  Location: [facility] OR;  Service: Orthopedics;  Laterality: Left;    COLONOSCOPY N/A 04/05/2018    Procedure: COLONOSCOPY;  Surgeon: Chava Ronquillo MD;  Location: [facility] ENDO;  Service: Endoscopy;  Laterality: N/A;    COLONOSCOPY N/A 05/02/2022    Procedure: COLONOSCOPY;  Surgeon: Alix Puente MD;  Location: [facility] ENDO;  Service: Endoscopy;  Laterality: N/A;    COLONOSCOPY N/A 06/07/2023    Procedure: COLONOSCOPY - rule out CMV  Cardiac clearance/Eliquis hold approval received on 05/21/23 per Dr. Meade, cardiology.  Note in encounters.  LB;  Surgeon: Daniella Shah MD;  Location: [facility];  Service: Endoscopy;  Laterality: N/A;    ESOPHAGOGASTRODUODENOSCOPY N/A 03/26/2024    Procedure: EGD (ESOPHAGOGASTRODUODENOSCOPY) 3/1-pt cleared, ok to hold eliquis for 3 days;  Surgeon: Daniella Shah MD;  Location: [facility];  Service: Endoscopy;  Laterality: N/A;    KIDNEY TRANSPLANT  2015    RETINAL LASER PROCEDURE         Time Tracking:     PT Received On: 01/18/25  PT Start Time: 1120     PT Stop Time: 1135  PT Total Time (min): 15 min     Billable Minutes: Evaluation 15      01/18/2025

## 2025-01-18 NOTE — PROGRESS NOTES
Nephrology Progress Note:    HPI:   Mr. Whittaker is a 69 year  male with a hx of previous ESRD likely related to diabetes and hypertension that was on dialysis several years prior to receiving a living related kidney transplant from his daughter in 2015.  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  He is followed by Dr. Gonsalez of [facility] seen last on 09/24/2024 at which time his serum creatinine was 2.2.     He was seen in the emergency department on 01/06/2025 complaining of increasing lower extremity edema.  At that time his serum creatinine was 2.8.  He returns to the emergency department 01/12/2025 with persistent lower extremity edema and associated blistering.  He also complains of bilateral knee pain to the point where he is unable to ambulate.  He attributes this to the change in weather.  His admission laboratory reveals a serum creatinine now up to 4.3 with a potassium of 5.4.  Nephrology has been asked to see and evaluate the patient.     As an outpatient he is chronically on Lasix 40 mg twice a day.  He denies the use of nonsteroidal anti-inflammatory drugs.  He denies dysuria hematuria or difficulty voiding.  He is chronically on Entresto.    Interval History:   Chart reviewed/patient examined.  Uneventful night.  He denies nausea, vomiting, shortness of breath.  He tolerated his PRBC transfusion.  No family present.    Health Status   Allergies:    is allergic to lisinopril, actos  [pioglitazone], and metformin.    Current medications:     Current Facility-Administered Medications:     0.9%  NaCl infusion (for blood administration), , Intravenous, Q24H PRN, Carlos Mathew MD    acetaminophen tablet 650 mg, 650 mg, Oral, Q4H PRN, Jaymie Medley MD,  650 mg at 01/16/25 0019    dextrose 50% injection 12.5 g, 12.5 g, Intravenous, PRN, Jaymie Medley MD    dextrose 50% injection 25 g, 25 g, Intravenous, PRN, Jaymie Medley MD    glucagon (human recombinant) injection 1 mg, 1 mg, Intramuscular, PRN, Jaymie Medley MD    glucose chewable tablet 16 g, 16 g, Oral, PRN, Jaymie Medley MD    glucose chewable tablet 24 g, 24 g, Oral, PRN, Jaymie Medley MD    HYDROcodone-acetaminophen 5-325 mg per tablet 1 tablet, 1 tablet, Oral, Q6H PRN, Jaymie Medley MD, 1 tablet at 01/16/25 1758    insulin aspart U-100 pen 0-10 Units, 0-10 Units, Subcutaneous, QID (AC + HS) PRN, Jaymie Medley MD, 2 Units at 01/18/25 1108    insulin glargine U-100 (Lantus) pen 10 Units, 10 Units, Subcutaneous, BID, Jaymie Medley MD, 10 Units at 01/18/25 0923    melatonin tablet 6 mg, 6 mg, Oral, Nightly PRN, Jaymie Medley MD    metoprolol injection 5 mg, 5 mg, Intravenous, Q6H PRN, Dottie North, NP    metoprolol succinate (TOPROL-XL) 24 hr tablet 25 mg, 25 mg, Oral, Daily, Carlos Mathew MD, 25 mg at 01/18/25 0923    mycophenolate tablet 500 mg, 500 mg, Oral, BID, Jaymie Medley MD, 500 mg at 01/18/25 0923    naloxone 0.4 mg/mL injection 0.02 mg, 0.02 mg, Intravenous, PRN, Jaymie Medley MD    nystatin 100,000 unit/mL suspension 500,000 Units, 500,000 Units, Oral, QID, Chidi James MD, 500,000 Units at 01/18/25 0923    ondansetron disintegrating tablet 8 mg, 8 mg, Oral, Q8H PRN, Jaymie Medley MD    ondansetron injection 4 mg, 4 mg, Intravenous, Q8H PRN, Jaymie Medley MD    pantoprazole EC tablet 40 mg, 40 mg, Oral, BID, Hilario Dahl PA-C, 40 mg at 01/18/25 0923    polyethylene glycol packet 17 g, 17 g, Oral, Daily PRN, Jaymie Medley MD    senna-docusate 8.6-50 mg per tablet 2 tablet, 2 tablet, Oral, Daily PRN, Jaymie Medley MD    sodium chloride 0.9% flush 10 mL, 10 mL, Intravenous, Q8H, Jaymie Medley MD, 10 mL at 01/18/25 0600    tacrolimus capsule  "3 mg, 3 mg, Oral, BID, Jaymie Medley MD, 3 mg at 01/18/25 0818         Objective       Physical Examination:   VS/Measurements   BP (!) 168/83 (BP Location: Left arm, Patient Position: Lying)   Pulse 108   Temp 98 °F (36.7 °C) (Oral)   Resp 18   Ht 5' 7" (1.702 m)   Wt 130.2 kg (287 lb 0.6 oz)   SpO2 96%   BMI 44.96 kg/m²   Vitals:    01/18/25 0800   BP:    Pulse: 108   Resp:    Temp:        UOP = 650 cc (last 24 hours)  General:   Obese, not in any distress.  Neck:  Supple, midline trachea  Respiratory: Non-labored,  Lungs are clear to auscultation.    Cardiovascular:  Tachycardic, Regular rhythm.    Abdomen:  Soft, Non-tender, Normal bowel sounds.  Allograft - nontender, no bruit  Muskuloskeletal:   pedal edema +  Vitals reviewed.    Laboratory Results   Today's Lab Results :    Recent Results (from the past 24 hours)   CBC Auto Differential    Collection Time: 01/17/25 12:07 PM   Result Value Ref Range    WBC 3.03 (L) 3.90 - 12.70 K/uL    RBC 3.51 (L) 4.60 - 6.20 M/uL    Hemoglobin 8.7 (L) 14.0 - 18.0 g/dL    Hematocrit 29.1 (L) 40.0 - 54.0 %    MCV 83 82 - 98 fL    MCH 24.8 (L) 27.0 - 31.0 pg    MCHC 29.9 (L) 32.0 - 36.0 g/dL    RDW 14.6 (H) 11.5 - 14.5 %    Platelets 245 150 - 450 K/uL    MPV 10.5 9.2 - 12.9 fL    Immature Granulocytes CANCELED 0.0 - 0.5 %    Immature Grans (Abs) CANCELED 0.00 - 0.04 K/uL    nRBC 0 0 /100 WBC    Gran % 60.2 38.0 - 73.0 %    Lymph % 16.9 (L) 18.0 - 48.0 %    Mono % 19.3 (H) 4.0 - 15.0 %    Eosinophil % 0.0 0.0 - 8.0 %    Basophil % 0.0 0.0 - 1.9 %    Bands 3.6 %    Platelet Estimate Appears normal     Aniso Slight     Poik Slight     Poly Occasional     Large/Giant Platelets Present     Differential Method Manual    Basic Metabolic Panel    Collection Time: 01/17/25 12:07 PM   Result Value Ref Range    Sodium 142 136 - 145 mmol/L    Potassium 4.5 3.5 - 5.1 mmol/L    Chloride 111 (H) 95 - 110 mmol/L    CO2 21 (L) 23 - 29 mmol/L    Glucose 170 (H) 70 - 110 mg/dL    BUN " 43 (H) 8 - 23 mg/dL    Creatinine 2.2 (H) 0.5 - 1.4 mg/dL    Calcium 9.6 8.7 - 10.5 mg/dL    Anion Gap 10 8 - 16 mmol/L    eGFR 31 (A) >60 mL/min/1.73 m^2   Protein electrophoresis, serum    Collection Time: 01/17/25 12:07 PM   Result Value Ref Range    Protein, Serum 4.3 (L) 6.0 - 8.4 g/dL   POCT glucose    Collection Time: 01/17/25 12:14 PM   Result Value Ref Range    POCT Glucose 199 (H) 70 - 110 mg/dL   POCT glucose    Collection Time: 01/17/25  4:10 PM   Result Value Ref Range    POCT Glucose 243 (H) 70 - 110 mg/dL   POCT glucose    Collection Time: 01/17/25  9:31 PM   Result Value Ref Range    POCT Glucose 139 (H) 70 - 110 mg/dL   CBC Auto Differential    Collection Time: 01/18/25  5:26 AM   Result Value Ref Range    WBC 2.85 (L) 3.90 - 12.70 K/uL    RBC 3.47 (L) 4.60 - 6.20 M/uL    Hemoglobin 8.4 (L) 14.0 - 18.0 g/dL    Hematocrit 28.7 (L) 40.0 - 54.0 %    MCV 83 82 - 98 fL    MCH 24.2 (L) 27.0 - 31.0 pg    MCHC 29.3 (L) 32.0 - 36.0 g/dL    RDW 14.5 11.5 - 14.5 %    Platelets 250 150 - 450 K/uL    MPV 10.7 9.2 - 12.9 fL    Immature Granulocytes CANCELED 0.0 - 0.5 %    Immature Grans (Abs) CANCELED 0.00 - 0.04 K/uL    nRBC 1 (A) 0 /100 WBC    Gran % 51.0 38.0 - 73.0 %    Lymph % 26.0 18.0 - 48.0 %    Mono % 21.0 (H) 4.0 - 15.0 %    Eosinophil % 0.0 0.0 - 8.0 %    Basophil % 0.0 0.0 - 1.9 %    Metamyelocytes 2.0 %    Aniso Slight     Poik Slight     Ovalocytes Occasional     Differential Method Manual    Basic Metabolic Panel    Collection Time: 01/18/25  5:26 AM   Result Value Ref Range    Sodium 142 136 - 145 mmol/L    Potassium 4.0 3.5 - 5.1 mmol/L    Chloride 111 (H) 95 - 110 mmol/L    CO2 21 (L) 23 - 29 mmol/L    Glucose 126 (H) 70 - 110 mg/dL    BUN 41 (H) 8 - 23 mg/dL    Creatinine 2.1 (H) 0.5 - 1.4 mg/dL    Calcium 9.6 8.7 - 10.5 mg/dL    Anion Gap 10 8 - 16 mmol/L    eGFR 33 (A) >60 mL/min/1.73 m^2   POCT glucose    Collection Time: 01/18/25  5:54 AM   Result Value Ref Range    POCT Glucose 125 (H) 70  - 110 mg/dL   POCT glucose    Collection Time: 01/18/25  6:03 AM   Result Value Ref Range    POCT Glucose 125 (H) 70 - 110 mg/dL   POCT glucose    Collection Time: 01/18/25 11:07 AM   Result Value Ref Range    POCT Glucose 188 (H) 70 - 110 mg/dL       @[facility]@ [unfilled]    Assessment & Plan   Active Hospital Problems    Diagnosis  POA    *Hyperkalemia [E87.5]  Yes    Gross hematuria [R31.0]  Yes    Tacrolimus-induced GI toxicity [K63.89, T45.1X5A]  Yes    CKD (chronic kidney disease) stage 4, GFR 15-29 ml/min [N18.4]  Yes    ABL Anemia plus anemia of CKD 4 [N18.9, D63.1]  Yes      Resolved Hospital Problems    Diagnosis Date Resolved POA    Melena [K92.1] 01/16/2025 Yes       Assessment/Recommendations:      ANGELITO on CKD stage 4 secondary to 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 His creatinine is back to his baseline.  His kidney transplant ultrasound did not reveal any obstruction but revealed increase in the main renal artery velocities (we will need follow-up as an outpatient).  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  Creatinine currently 2.3.  His Prograf level came back therapeutic and was restarted on his Prograf.  Also on CellCept.       Hematuria secondary to possible Ann trauma.  His anticoagulants on hold.    Improving.     Lower extremity edema with skin changes consistent with venous stasis     Hyperkalemia.  His Entresto and the potassium supplements on hold.   Resolved.  Lokelma discontinued.     Hypercalcemia.  Corrected calcium 11.6, patient asymptomatic.  PTH within normal range.  We will check SPEP/UPEP.  Noted to be on calcitriol outpatient but is on hold here.  Avoid calcium and vitamin-D supplements.      Melena.   Resolved.  GI is following.     Anemia.  Likely secondary to hematuria/possible GI bleed from Eliquis/aspirin secondary to Prograf toxicity.  He  received 1 unit of PRBCs overnight with improvement of hemoglobin to 8.8.     Hypertension with renal disease.  His blood pressure is stable.     Diabetes with renal disease     CAD/CHF with diminished ejection fraction followed by Dr. Man of Cardiology.  His EF was 35% in 11/2024.          -Portions of this note were created using voice recognition software.  It is possible that there are errors, which have persisted, despite proofreading.  If there is a question regarding contents of this document, please contact me for clarification.

## 2025-01-18 NOTE — PLAN OF CARE
Evaluation performed. Pt required mod A for bed mobility rolling in the bed. Pt with max cues required for rolling for positioning and reaching for railing to assist with rolling. PT offered sitting EOB and attempting transfer, however, pt reported that he soiled himself and that he was needed to be changed. PT offered to assist in changing pt however, he declined stating that he also needed pain medication d/t having pain. Session terminated and evaluation terminated at that time. Pt in need of further OOB assessment.

## 2025-01-19 LAB
ANION GAP SERPL CALC-SCNC: 14 MMOL/L (ref 8–16)
BASOPHILS NFR BLD: 0 % (ref 0–1.9)
BUN SERPL-MCNC: 43 MG/DL (ref 8–23)
CALCIUM SERPL-MCNC: 9.7 MG/DL (ref 8.7–10.5)
CHLORIDE SERPL-SCNC: 109 MMOL/L (ref 95–110)
CO2 SERPL-SCNC: 18 MMOL/L (ref 23–29)
CREAT SERPL-MCNC: 2.3 MG/DL (ref 0.5–1.4)
DIFFERENTIAL METHOD BLD: ABNORMAL
EOSINOPHIL NFR BLD: 0 % (ref 0–8)
ERYTHROCYTE [DISTWIDTH] IN BLOOD BY AUTOMATED COUNT: 14.6 % (ref 11.5–14.5)
EST. GFR  (NO RACE VARIABLE): 30 ML/MIN/1.73 M^2
GLUCOSE SERPL-MCNC: 158 MG/DL (ref 70–110)
HCT VFR BLD AUTO: 29.8 % (ref 40–54)
HGB BLD-MCNC: 8.6 G/DL (ref 14–18)
IMM GRANULOCYTES # BLD AUTO: ABNORMAL K/UL (ref 0–0.04)
IMM GRANULOCYTES NFR BLD AUTO: ABNORMAL % (ref 0–0.5)
LACTATE SERPL-SCNC: 0.9 MMOL/L (ref 0.5–2.2)
LYMPHOCYTES NFR BLD: 21 % (ref 18–48)
MCH RBC QN AUTO: 24.5 PG (ref 27–31)
MCHC RBC AUTO-ENTMCNC: 28.9 G/DL (ref 32–36)
MCV RBC AUTO: 85 FL (ref 82–98)
MONOCYTES NFR BLD: 25 % (ref 4–15)
MYELOCYTES NFR BLD MANUAL: 1 %
NEUTROPHILS NFR BLD: 53 % (ref 38–73)
NRBC BLD-RTO: 1 /100 WBC
PLATELET # BLD AUTO: 265 K/UL (ref 150–450)
PLATELET BLD QL SMEAR: ABNORMAL
PMV BLD AUTO: 10.4 FL (ref 9.2–12.9)
POCT GLUCOSE: 153 MG/DL (ref 70–110)
POCT GLUCOSE: 182 MG/DL (ref 70–110)
POCT GLUCOSE: 189 MG/DL (ref 70–110)
POCT GLUCOSE: 200 MG/DL (ref 70–110)
POIKILOCYTOSIS BLD QL SMEAR: SLIGHT
POTASSIUM SERPL-SCNC: 4.1 MMOL/L (ref 3.5–5.1)
RBC # BLD AUTO: 3.51 M/UL (ref 4.6–6.2)
SODIUM SERPL-SCNC: 141 MMOL/L (ref 136–145)
WBC # BLD AUTO: 3.21 K/UL (ref 3.9–12.7)

## 2025-01-19 PROCEDURE — 85007 BL SMEAR W/DIFF WBC COUNT: CPT | Performed by: STUDENT IN AN ORGANIZED HEALTH CARE EDUCATION/TRAINING PROGRAM

## 2025-01-19 PROCEDURE — 63600175 PHARM REV CODE 636 W HCPCS: Performed by: EMERGENCY MEDICINE

## 2025-01-19 PROCEDURE — 85027 COMPLETE CBC AUTOMATED: CPT | Performed by: STUDENT IN AN ORGANIZED HEALTH CARE EDUCATION/TRAINING PROGRAM

## 2025-01-19 PROCEDURE — 36415 COLL VENOUS BLD VENIPUNCTURE: CPT | Performed by: STUDENT IN AN ORGANIZED HEALTH CARE EDUCATION/TRAINING PROGRAM

## 2025-01-19 PROCEDURE — 97530 THERAPEUTIC ACTIVITIES: CPT | Mod: CQ

## 2025-01-19 PROCEDURE — 83605 ASSAY OF LACTIC ACID: CPT | Performed by: STUDENT IN AN ORGANIZED HEALTH CARE EDUCATION/TRAINING PROGRAM

## 2025-01-19 PROCEDURE — 21400001 HC TELEMETRY ROOM

## 2025-01-19 PROCEDURE — 25000003 PHARM REV CODE 250: Performed by: STUDENT IN AN ORGANIZED HEALTH CARE EDUCATION/TRAINING PROGRAM

## 2025-01-19 PROCEDURE — 99232 SBSQ HOSP IP/OBS MODERATE 35: CPT | Mod: ,,, | Performed by: INTERNAL MEDICINE

## 2025-01-19 PROCEDURE — A4216 STERILE WATER/SALINE, 10 ML: HCPCS | Performed by: EMERGENCY MEDICINE

## 2025-01-19 PROCEDURE — 25000003 PHARM REV CODE 250: Performed by: PHYSICIAN ASSISTANT

## 2025-01-19 PROCEDURE — 25000003 PHARM REV CODE 250: Performed by: HOSPITALIST

## 2025-01-19 PROCEDURE — 25000003 PHARM REV CODE 250: Performed by: EMERGENCY MEDICINE

## 2025-01-19 PROCEDURE — 80048 BASIC METABOLIC PNL TOTAL CA: CPT | Performed by: STUDENT IN AN ORGANIZED HEALTH CARE EDUCATION/TRAINING PROGRAM

## 2025-01-19 RX ORDER — PREDNISONE 5 MG/1
5 TABLET ORAL DAILY
Status: DISCONTINUED | OUTPATIENT
Start: 2025-01-20 | End: 2025-01-24

## 2025-01-19 RX ORDER — GABAPENTIN 300 MG/1
300 CAPSULE ORAL 3 TIMES DAILY
Status: DISCONTINUED | OUTPATIENT
Start: 2025-01-19 | End: 2025-01-29 | Stop reason: HOSPADM

## 2025-01-19 RX ORDER — LIDOCAINE 50 MG/G
2 PATCH TOPICAL
Status: DISCONTINUED | OUTPATIENT
Start: 2025-01-19 | End: 2025-01-29 | Stop reason: HOSPADM

## 2025-01-19 RX ADMIN — INSULIN ASPART 2 UNITS: 100 INJECTION, SOLUTION INTRAVENOUS; SUBCUTANEOUS at 05:01

## 2025-01-19 RX ADMIN — NYSTATIN 500000 UNITS: 100000 SUSPENSION ORAL at 09:01

## 2025-01-19 RX ADMIN — INSULIN GLARGINE 10 UNITS: 100 INJECTION, SOLUTION SUBCUTANEOUS at 09:01

## 2025-01-19 RX ADMIN — GABAPENTIN 300 MG: 300 CAPSULE ORAL at 09:01

## 2025-01-19 RX ADMIN — TACROLIMUS 3 MG: 1 CAPSULE ORAL at 09:01

## 2025-01-19 RX ADMIN — HYDROCODONE BITARTRATE AND ACETAMINOPHEN 1 TABLET: 5; 325 TABLET ORAL at 09:01

## 2025-01-19 RX ADMIN — Medication 10 ML: at 09:01

## 2025-01-19 RX ADMIN — LIDOCAINE 5% 2 PATCH: 700 PATCH TOPICAL at 09:01

## 2025-01-19 RX ADMIN — TACROLIMUS 3 MG: 1 CAPSULE ORAL at 05:01

## 2025-01-19 RX ADMIN — MYCOPHENOLATE MOFETIL 500 MG: 500 TABLET, FILM COATED ORAL at 09:01

## 2025-01-19 RX ADMIN — PANTOPRAZOLE SODIUM 40 MG: 40 TABLET, DELAYED RELEASE ORAL at 09:01

## 2025-01-19 RX ADMIN — NYSTATIN 500000 UNITS: 100000 SUSPENSION ORAL at 05:01

## 2025-01-19 RX ADMIN — METOPROLOL SUCCINATE 25 MG: 25 TABLET, FILM COATED, EXTENDED RELEASE ORAL at 09:01

## 2025-01-19 RX ADMIN — INSULIN ASPART 2 UNITS: 100 INJECTION, SOLUTION INTRAVENOUS; SUBCUTANEOUS at 11:01

## 2025-01-19 RX ADMIN — INSULIN ASPART 1 UNITS: 100 INJECTION, SOLUTION INTRAVENOUS; SUBCUTANEOUS at 09:01

## 2025-01-19 RX ADMIN — Medication 10 ML: at 03:01

## 2025-01-19 RX ADMIN — GABAPENTIN 300 MG: 300 CAPSULE ORAL at 02:01

## 2025-01-19 RX ADMIN — Medication 10 ML: at 05:01

## 2025-01-19 RX ADMIN — HYDROCODONE BITARTRATE AND ACETAMINOPHEN 1 TABLET: 5; 325 TABLET ORAL at 03:01

## 2025-01-19 RX ADMIN — NYSTATIN 500000 UNITS: 100000 SUSPENSION ORAL at 02:01

## 2025-01-19 NOTE — PROGRESS NOTES
Nephrology Progress Note:    HPI:   Mr. Whittaker is a 69 year  male with a hx of previous ESRD likely related to diabetes and hypertension that was on dialysis several years prior to receiving a living related kidney transplant from his daughter in 2015.  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  He is followed by Dr. Gonsalez of [facility] seen last on 09/24/2024 at which time his serum creatinine was 2.2.     He was seen in the emergency department on 01/06/2025 complaining of increasing lower extremity edema.  At that time his serum creatinine was 2.8.  He returns to the emergency department 01/12/2025 with persistent lower extremity edema and associated blistering.  He also complains of bilateral knee pain to the point where he is unable to ambulate.  He attributes this to the change in weather.  His admission laboratory reveals a serum creatinine now up to 4.3 with a potassium of 5.4.  Nephrology has been asked to see and evaluate the patient.     As an outpatient he is chronically on Lasix 40 mg twice a day.  He denies the use of nonsteroidal anti-inflammatory drugs.  He denies dysuria hematuria or difficulty voiding.  He is chronically on Entresto.    Interval History:   Chart reviewed/patient examined.  No new issues per staff.  Family at bedside indicate he is not eating much.  Patient reports no nausea, vomiting, or shortness of breath.    Health Status   Allergies:    is allergic to lisinopril, actos  [pioglitazone], and metformin.    Current medications:     Current Facility-Administered Medications:     0.9%  NaCl infusion (for blood administration), , Intravenous, Q24H PRN, Carlos Mathew MD    acetaminophen tablet 650 mg, 650 mg, Oral, Q4H PRN, Galindo  Jaymie BASURTO MD, 650 mg at 01/16/25 0019    dextrose 50% injection 12.5 g, 12.5 g, Intravenous, PRN, Jaymie Medley MD    dextrose 50% injection 25 g, 25 g, Intravenous, PRN, Jaymie Medley MD    gabapentin capsule 300 mg, 300 mg, Oral, TID, Chidi James MD, 300 mg at 01/19/25 0931    glucagon (human recombinant) injection 1 mg, 1 mg, Intramuscular, PRN, Jaymie Medley MD    glucose chewable tablet 16 g, 16 g, Oral, PRN, Jaymie Medley MD    glucose chewable tablet 24 g, 24 g, Oral, PRN, Jaymie Medley MD    HYDROcodone-acetaminophen 5-325 mg per tablet 1 tablet, 1 tablet, Oral, Q6H PRN, Jaymie Medley MD, 1 tablet at 01/19/25 0930    insulin aspart U-100 pen 0-10 Units, 0-10 Units, Subcutaneous, QID (AC + HS) PRN, Jaymie Medley MD, 2 Units at 01/19/25 1156    insulin glargine U-100 (Lantus) pen 10 Units, 10 Units, Subcutaneous, BID, Jaymie Medley MD, 10 Units at 01/19/25 0931    LIDOcaine 5 % patch 2 patch, 2 patch, Transdermal, Q24H, Chidi James MD, 2 patch at 01/19/25 0931    melatonin tablet 6 mg, 6 mg, Oral, Nightly PRN, Jaymie Medley MD    metoprolol injection 5 mg, 5 mg, Intravenous, Q6H PRN, Dottie North, NP    metoprolol succinate (TOPROL-XL) 24 hr tablet 25 mg, 25 mg, Oral, Daily, Carlos Mathew MD, 25 mg at 01/19/25 0931    mycophenolate tablet 500 mg, 500 mg, Oral, BID, Jaymie Medley MD, 500 mg at 01/19/25 0931    naloxone 0.4 mg/mL injection 0.02 mg, 0.02 mg, Intravenous, PRN, Jaymie Medley MD    nystatin 100,000 unit/mL suspension 500,000 Units, 500,000 Units, Oral, QID, Chidi James MD, 500,000 Units at 01/19/25 0931    ondansetron disintegrating tablet 8 mg, 8 mg, Oral, Q8H PRN, Jaymie Medley MD    ondansetron injection 4 mg, 4 mg, Intravenous, Q8H PRN, Jaymie Medley MD    pantoprazole EC tablet 40 mg, 40 mg, Oral, BID, Hilario Dahl PA-C, 40 mg at 01/19/25 0931    polyethylene glycol packet 17 g, 17 g, Oral, Daily PRN, Jaymie Medley MD     "senna-docusate 8.6-50 mg per tablet 2 tablet, 2 tablet, Oral, Daily PRN, Jaymie Medley MD    sodium chloride 0.9% flush 10 mL, 10 mL, Intravenous, Q8H, Jaymie Medley MD, 10 mL at 01/19/25 0539    tacrolimus capsule 3 mg, 3 mg, Oral, BID, Jaymie Medley MD, 3 mg at 01/19/25 0931         Objective       Physical Examination:   VS/Measurements   BP (!) 119/57   Pulse (!) 116   Temp (!) 100.6 °F (38.1 °C) (Axillary)   Resp 18   Ht 5' 7" (1.702 m)   Wt 130.2 kg (287 lb 0.6 oz)   SpO2 (!) 93%   BMI 44.96 kg/m²   Vitals:    01/19/25 1202   BP: (!) 119/57   Pulse: (!) 116   Resp: 18   Temp: (!) 100.6 °F (38.1 °C)       UOP = 900 cc (last 24 hours)  General:   Obese, not in any distress.  Neck:  Supple, midline trachea  Respiratory: Non-labored,  Lungs are clear to auscultation.    Cardiovascular:  Tachycardic, Regular rhythm.    Abdomen:  Soft, Non-tender, Normal bowel sounds.  Allograft - nontender, no bruit  Muskuloskeletal:   pedal edema +  Vitals reviewed.    Laboratory Results   Today's Lab Results :    Recent Results (from the past 24 hours)   POCT glucose    Collection Time: 01/18/25  4:18 PM   Result Value Ref Range    POCT Glucose 179 (H) 70 - 110 mg/dL   POCT glucose    Collection Time: 01/18/25  8:21 PM   Result Value Ref Range    POCT Glucose 179 (H) 70 - 110 mg/dL   POCT glucose    Collection Time: 01/19/25  5:15 AM   Result Value Ref Range    POCT Glucose 153 (H) 70 - 110 mg/dL   CBC Auto Differential    Collection Time: 01/19/25  6:11 AM   Result Value Ref Range    WBC 3.21 (L) 3.90 - 12.70 K/uL    RBC 3.51 (L) 4.60 - 6.20 M/uL    Hemoglobin 8.6 (L) 14.0 - 18.0 g/dL    Hematocrit 29.8 (L) 40.0 - 54.0 %    MCV 85 82 - 98 fL    MCH 24.5 (L) 27.0 - 31.0 pg    MCHC 28.9 (L) 32.0 - 36.0 g/dL    RDW 14.6 (H) 11.5 - 14.5 %    Platelets 265 150 - 450 K/uL    MPV 10.4 9.2 - 12.9 fL    Immature Granulocytes CANCELED 0.0 - 0.5 %    Immature Grans (Abs) CANCELED 0.00 - 0.04 K/uL    nRBC 1 (A) 0 /100 WBC    " Gran % 53.0 38.0 - 73.0 %    Lymph % 21.0 18.0 - 48.0 %    Mono % 25.0 (H) 4.0 - 15.0 %    Eosinophil % 0.0 0.0 - 8.0 %    Basophil % 0.0 0.0 - 1.9 %    Myelocytes 1.0 %    Platelet Estimate Appears normal     Poik Slight     Differential Method Manual    Basic Metabolic Panel    Collection Time: 01/19/25  6:11 AM   Result Value Ref Range    Sodium 141 136 - 145 mmol/L    Potassium 4.1 3.5 - 5.1 mmol/L    Chloride 109 95 - 110 mmol/L    CO2 18 (L) 23 - 29 mmol/L    Glucose 158 (H) 70 - 110 mg/dL    BUN 43 (H) 8 - 23 mg/dL    Creatinine 2.3 (H) 0.5 - 1.4 mg/dL    Calcium 9.7 8.7 - 10.5 mg/dL    Anion Gap 14 8 - 16 mmol/L    eGFR 30 (A) >60 mL/min/1.73 m^2   POCT glucose    Collection Time: 01/19/25 11:53 AM   Result Value Ref Range    POCT Glucose 182 (H) 70 - 110 mg/dL       @[email or web address]@ @[facility]@    Assessment & Plan   Active Hospital Problems    Diagnosis  POA    *Severe physical deconditioning [R53.81]  No    Hyperkalemia [E87.5]  Yes    Gross hematuria [R31.0]  Yes    Tacrolimus-induced GI toxicity [K63.89, T45.1X5A]  Yes    CKD (chronic kidney disease) stage 4, GFR 15-29 ml/min [N18.4]  Yes    ABL Anemia plus anemia of CKD 4 [N18.9, D63.1]  Yes      Resolved Hospital Problems    Diagnosis Date Resolved POA    Melena [K92.1] 01/16/2025 Yes         Assessment/Recommendations:      ANGELITO on CKD stage 4 secondary to 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 His creatinine is back to his baseline.  His kidney transplant ultrasound did not reveal any obstruction but revealed increase in the main renal artery velocities (we will need follow-up as an outpatient).        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  Creatinine currently 2.3.      Hematuria secondary to possible Ann trauma.  His anticoagulants on hold.    Improving.     Lower extremity edema with skin changes consistent  with venous stasis     Hyperkalemia.  His Entresto and the potassium supplements on hold --> resolved.  Lokelma discontinued.     Hypercalcemia.  Corrected calcium 11.6, patient asymptomatic.  PTH within normal range.   SPEP/UPEP - peniding.  Noted to be on calcitriol outpatient but is on hold here.  Avoid calcium and vitamin-D supplements.      Melena.   Resolved.  GI is following.     Anemia.  Likely secondary to hematuria/possible GI bleed from Eliquis/aspirin secondary to Prograf toxicity.  He received 1 unit of PRBCs overnight with improvement of hemoglobin to 8.8.     Hypertension with renal disease.  His blood pressure is stable.     Diabetes with renal disease     CAD/CHF with diminished ejection fraction followed by Dr. Man of Cardiology.  His EF was 35% in 11/2024.        -Portions of this note were created using voice recognition software.  It is possible that there are errors, which have persisted, despite proofreading.  If there is a question regarding contents of this document, please contact me for clarification.

## 2025-01-19 NOTE — PLAN OF CARE
Turned q2h. Heel boots on. Wound care complete. Updated patient on plan of care. Instructed patient to use call light for assistance, call light in reach. Hourly rounding performed. Vitals q4 hours. Education provided, questions answered/encouraged. Chart check complete.     Problem: Adult Inpatient Plan of Care  Goal: Plan of Care Review  Outcome: Progressing  Goal: Patient-Specific Goal (Individualized)  Outcome: Progressing  Goal: Absence of Hospital-Acquired Illness or Injury  Outcome: Progressing  Goal: Optimal Comfort and Wellbeing  Outcome: Progressing  Goal: Readiness for Transition of Care  Outcome: Progressing     Problem: Bariatric Environmental Safety  Goal: Safety Maintained with Care  Outcome: Progressing     Problem: Diabetes Comorbidity  Goal: Blood Glucose Level Within Targeted Range  Outcome: Progressing     Problem: Wound  Goal: Optimal Coping  Outcome: Progressing  Goal: Optimal Functional Ability  Outcome: Progressing  Goal: Absence of Infection Signs and Symptoms  Outcome: Progressing  Goal: Improved Oral Intake  Outcome: Progressing  Goal: Optimal Pain Control and Function  Outcome: Progressing  Goal: Skin Health and Integrity  Outcome: Progressing  Goal: Optimal Wound Healing  Outcome: Progressing     Problem: Skin Injury Risk Increased  Goal: Skin Health and Integrity  Outcome: Progressing

## 2025-01-19 NOTE — PT/OT/SLP PROGRESS
Physical Therapy  Treatment    Mitch Whittaker   MRN: [id number]   Admitting Diagnosis: Physical deconditioning    PT Received On: 01/19/25  PT Start Time: 0946     PT Stop Time: 1011    PT Total Time (min): 25 min       Billable Minutes:  Therapeutic Activity 25    Treatment Type: Treatment  PT/PTA: PTA     Number of PTA visits since last PT visit: 1       General Precautions: Standard, fall  Orthopedic Precautions: N/A  Braces: N/A  Respiratory Status: Room air    Spiritual, Cultural Beliefs, Confucianist Practices, Values that Affect Care: no    Subjective:  Communicated with nurse Blancas and completed Epic chart review prior to session. Encouragement required to participate.     Pain/Comfort  Pain Rating 1:  (c/o pain with every movement and touch, did not rate)  Location - Side 1: Bilateral (everywhere)  Location - Orientation 1: generalized  Pain Addressed 1: Reposition, Distraction    Objective:   Patient found with: PureWick, peripheral IV, pressure relief boots, SCD    Pt c/o pain with every movement, even light touches to bedding.     Rolling Left and Right: Total A  Supine > Sit: Total A x2, demonstrates posterior pushing  Pt tolerated sitting EOB for a total of 10 mins with Total A. Focusing on tolerance to upright position and core and trunk stability.  Pt unable to maintain self supported sitting. Required Total A to maintain balance and prevent falling posteriorly. Pt with no efforts to assist staff. Pt unable to use BUE to brace self on bed due to weakness and c/o pain.    Sit > Supine: Total A x2  Supine scoot towards HOB: Total A x2 with bed in trendelenburg position.  Left lateral scoot in supine: Total A x2    Attempted to perform BLE AROM TE. Pt only able to perform AP on LLE. Attempted to perform BUE AROM TE pt only able to perform elbow flexion through 10% ROM.   Treatment and Education:  Educated pt on role of PT in acute care and benefits of consistent participation in services. Educated pt on  current POC and goals. Educated pt on benefits of sitting EOB. Encouraged pt to sit with bed in chair position for all meals and for a min 2hr 3x/day. Encouraged pt to perform HEP within all planes ( HEEL SLIDES, AP, HIP ABD/ADD, ELBOW FLEX/EXT, SHLD FLEX/EXT) throughout the day to regain strength and improve ROM. Reviewed call don't fall policy and to call for assistance with all OOB needs. Pt expressed understanding.     AM-PAC 6 CLICK MOBILITY  How much help from another person does this patient currently need?   1 = Unable, Total/Dependent Assistance  2 = A lot, Maximum/Moderate Assistance  3 = A little, Minimum/Contact Guard/Supervision  4 = None, Modified [place]/Independent    Turning over in bed (including adjusting bedclothes, sheets and blankets)?: 1  Sitting down on and standing up from a chair with arms (e.g., wheelchair, bedside commode, etc.): 1  Moving from lying on back to sitting on the side of the bed?: 1  Moving to and from a bed to a chair (including a wheelchair)?: 1  Need to walk in hospital room?: 1  Climbing 3-5 steps with a railing?: 1  Basic Mobility Total Score: 6    AM-PAC Raw Score CMS G-Code Modifier Level of Impairment Assistance   6 % Total / Unable   7 - 9 CM 80 - 100% Maximal Assist   10 - 14 CL 60 - 80% Moderate Assist   15 - 19 CK 40 - 60% Moderate Assist   20 - 22 CJ 20 - 40% Minimal Assist   23 CI 1-20% SBA / CGA   24 CH 0% Independent/ Mod I     Patient left HOB elevated with all lines intact, call button in reach, bed alarm on, and nurse notified.    Assessment:  Mitch Whittaker is a 69 y.o. male with a medical diagnosis of Physical deconditioning and presents with the following functional limitations. Pt requires Total A x2 for all bed mobility. Pt demonstrates no efforts to assist staff and demonstrates posterior pushing upon coming into sitting position. Pt sat EOB today for a total of 10 mins with Total A. Pt will continue to benefit from skilled PT to address  the below deficits to decrease caregiver burden.    Rehab identified problem list/impairments: weakness, impaired cognition, impaired endurance, decreased upper extremity function, decreased lower extremity function, impaired functional mobility, gait instability, decreased safety awareness, impaired balance, pain, impaired cardiopulmonary response to activity    Rehab potential is fair.    Activity tolerance: Fair    Discharge recommendations: High Intensity Therapy (SNF)      Barriers to discharge:      Equipment recommendations: to be determined by next level of care     GOALS:   Multidisciplinary Problems       Physical Therapy Goals          Problem: Physical Therapy    Goal Priority Disciplines Outcome Interventions   Physical Therapy Goal     PT, PT/OT Progressing    Description: All LTGs to be met by 2/1/25    Bed mobility Sba   Transfers SBA   Gait of at least 100 feet SBA   Pt will increase AMPAC score by 2 points to progress functional mobility  Pt will tolerate > 10 min of functional activity to progress gross functional mobility                        DME Justifications:  No DME recommended requiring DME justifications    PLAN:    Patient to be seen 3 x/week to address the above listed problems via therapeutic activities, gait training, therapeutic exercises, neuromuscular re-education  Plan of Care expires: 02/01/25  Plan of Care reviewed with: patient         01/19/2025

## 2025-01-19 NOTE — ASSESSMENT & PLAN NOTE
Anemia is likely due to acute blood loss which was from CKD, s/p Renal transplant and chronic disease due to Chronic Kidney Disease. Most recent hemoglobin and hematocrit are listed below.  Recent Labs     01/17/25  1207 01/18/25  0526 01/19/25  0611   HGB 8.7* 8.4* 8.6*   HCT 29.1* 28.7* 29.8*       01/19/2025  stable

## 2025-01-19 NOTE — PROGRESS NOTES
[facility] Medicine  Progress Note    Patient Name: Mitch Whittaker  MRN: [id number]  Patient Class: IP- Inpatient   Admission Date: 1/12/2025  Length of Stay: 7 days  Attending Physician: Chidi James MD  Primary Care Provider: Valery Caal MD        Subjective     Principal Problem:Physical deconditioning        HPI:  Mitch Whittaker is a 69 y.o. male patient with a PMHx of CHF- EF 40%, anemia, hyperparathyroidism, type 2 DM, s/p kidney transplant 2015 on Prograf and Cellcept, DVT on Eliquis, MARION, HLD, HTN, CKD, Hep C, and arthritis who presents to the Emergency Department for evaluation of nausea vomiting and diarrhea over the past several days (both of which have improved), but felt weak and couldn't move around like normal. no abd pain, no systemic symptoms, stool now formed. Pt is a very poor historian. According to nursing staff, family reported that the pt has been sitting in his chair for the last 3 days. Pt reports that he has had two episodes of diarrhea since yesterday, but due to the increasing weakness in his knees he was unable to get up from his chair. Pt normally ambulates with assistance from a walker. Symptoms are constant and moderate in severity. Associated sxs include blood in stool (x4 days) and n/v yesterday, but none today after PO. Patient denies any fever, abdominal pain, appetite changes, SOB, dysuria, and all other sxs at this time. No prior tx. Pt is on Eliquis. No further complaints or concerns at this time.   In the ER, VS /65, , Sats 100% on RA, Afeb, WBC 3.7, H/H 9.5/33, Bun/Cr 82/4.3, K 6.7- treated aggressively in the ER and rechecked and repeat 5.4. He is heme positive as well. C Diff Neg. She is being admitted for possible Hyperkalemia, acute GI Bleed, ANGELITO.     Overview/Hospital Course:  69 y.o. male patient with a PMHx of CHF- EF 40%, anemia, hyperparathyroidism, type 2 DM, s/p kidney transplant 2015 on Prograf and Cellcept,  DVT on Eliquis, MARION, HLD, HTN, CKD, Hep C, and arthritis who presents to the Emergency Department for evaluation of nausea vomiting and diarrhea over the past several days (both of which have improved), but felt weak and couldn't move around like normal. no abd pain, no systemic symptoms, stool now formed. Pt is a very poor historian. According to nursing staff, family reported that the pt has been sitting in his chair for the last 3 days. Pt reports that he has had two episodes of diarrhea since yesterday, but due to the increasing weakness in his knees he was unable to get up from his chair. Pt normally ambulates with assistance from a walker. Symptoms are constant and moderate in severity. Associated sxs include blood in stool (x4 days) and n/v yesterday, but none today after PO. Patient denies any fever, abdominal pain, appetite changes, SOB, dysuria, and all other sxs at this time. No prior tx. Pt is on Eliquis. No further complaints or concerns at this time.   In the ER, VS /65, , Sats 100% on RA, Afeb, WBC 3.7, H/H 9.5/33, Bun/Cr 82/4.3, K 6.7- treated aggressively in the ER and rechecked and repeat 5.4. He is heme positive as well. C Diff Neg. She is being admitted for possible Hyperkalemia, acute GI Bleed, ANGELITO.     1/13- Appreciate Renal and GI input- pt looks and feels a little better, stronger, more alert and responsive. Wife and daughter are here. His prograf level very high- 32- hence Held. It seems that he was getting Prograf toxicity at home which then resulted in Hematuria, ABL Anemia, ANGELITO and Hyperkalemia. Pt also felt weak and low sugars, so drank a lot of OJ which further raised his K level. Does not appears to have true GI bleed. But has hematuria- hence Purewick catheter placed and Dr. Bennett also started him on IVF- hematuria appears to be clearing. Pt feeling better, will check labs in am and start PT/OT. K was 6.1 this am- started on lokelma.     1/14- looks a little better, no  "melena but still has hematuria. H/h stable, 8.3/28, K still 6, Bun.Cr still high 83/4. Repeat Prograf level pending. VSS Afeb, he is more alert and talking. Wound care wrapped his legs with moderate compression. Had mod R SFA stenosis, vascular evaluated him and will continue to monitor him, no surgery or angioplasty needed at this time. Will start PT/OT in am. Cont IVF, If Hematuria persists, start CBI in am.     1/15- looks better but c/o pain in his legs which are in a compression socks. Good response to iVF, Hematuria getting better and Cr down to 3 now with good urine output. H/H dropped to 7.2/22 sec to IVF and Hematuria. Still await repeat prograf level. Getting PT/OT, started on Norco for pain control.      1/16- resting quietly, comfortable, making good amount of urine, hematuria almost cleared with IVF. Bun/Cr down to 58/2.9. K normal, lokelm d/koki. H/H improved to 7.7/26. Prograf noiw 5.2, will restart at 3 mg bid. Stop hydration in am, start PT/OT. D/c home soon.     01/17/2025  Patient requiring max assists with bed transitions and feeding. High intensity therapy recommended by therapists. Patient previously independent, living at home. Referrals for rehab placed at this time.      01/18/2025  Some notable objective clinical improvement. Reports decreased ROM in UE bilaterally but predominantly his right UE. Radiography of the left hand and R shoulder unremarkable. Will obtain CT cervical spine to further assess for stenosis.     01/19/2025  Cervical spine CT shows right moderate foraminal stenosis at C3-4 and C5-6 secondary to spurring of the uncovertebral joints.  Will obtain MRI of spine and consult orthospine to assess to see if findings could be contributing to his significant UE weakness and pain.     Interval history:  See hospital course    Objective:   BP (!) 119/57   Pulse (!) 116   Temp (!) 100.6 °F (38.1 °C) (Axillary)   Resp 18   Ht 5' 7" (1.702 m)   Wt 130.2 kg (287 lb 0.6 oz)   SpO2 " (!) 93%   BMI 44.96 kg/m²     Intake/Output Summary (Last 24 hours) at 1/19/2025 1206  Last data filed at 1/19/2025 0626  Gross per 24 hour   Intake --   Output 900 ml   Net -900 ml       PHYSICAL EXAM  Vitals reviewed  Constitutional:       General: He is not in acute distress.     Appearance: Normal appearance. He is well-developed. He is obese. He is ill-appearing. He is not toxic-appearing or diaphoretic.   Cardiovascular:      Rate and Rhythm: Regular rhythm. Tachycardia present.      Heart sounds: Normal heart sounds. No murmur heard.  Pulmonary:      Effort: Pulmonary effort is normal. No respiratory distress.      Breath sounds: Normal breath sounds. No wheezing.   Abdominal:      General: Bowel sounds are normal. There is no distension.      Palpations: Abdomen is soft. There is no mass.      Tenderness: There is no abdominal tenderness.   Musculoskeletal:         General: Swelling present.      Cervical back: Normal range of motion and neck supple.      Right lower leg: Edema present.      Left lower leg: Edema present.   Skin:     General: Skin is warm and dry.      Capillary Refill: Capillary refill takes 2 to 3 seconds.   Neurological:      General: No focal deficit present.      Mental Status: He is alert and oriented to person, place, and time.      Cranial Nerves: No cranial nerve deficit.      Motor: Weakness present.   Psychiatric:         Mood and Affect: Mood normal.         Behavior: Behavior normal.         Thought Content: Thought content normal.         Judgment: Judgment normal.     LABS  All labs from the past 24 hours were reviewed.     BMP:   Recent Labs   Lab 01/19/25  0611   *      K 4.1      CO2 18*   BUN 43*   CREATININE 2.3*   CALCIUM 9.7     CBC:   Recent Labs   Lab 01/17/25  1207 01/18/25  0526 01/19/25  0611   WBC 3.03* 2.85* 3.21*   HGB 8.7* 8.4* 8.6*   HCT 29.1* 28.7* 29.8*    250 265     CMP:   Recent Labs   Lab 01/17/25  1207 01/18/25  0526  "01/19/25  0611    142 141   K 4.5 4.0 4.1   * 111* 109   CO2 21* 21* 18*   * 126* 158*   BUN 43* 41* 43*   CREATININE 2.2* 2.1* 2.3*   CALCIUM 9.6 9.6 9.7   ANIONGAP 10 10 14     Cardiac Markers: No results for input(s): "CKMB", "MYOGLOBIN", "BNP", "TROPISTAT" in the last 48 hours.  Coagulation: No results for input(s): "PT", "INR", "APTT" in the last 48 hours.  Lactic Acid: No results for input(s): "LACTATE" in the last 48 hours.  Magnesium: No results for input(s): "MG" in the last 48 hours.  Troponin: No results for input(s): "TROPONINI", "TROPONINIHS" in the last 48 hours.  TSH:   No results for input(s): "TSH" in the last 4320 hours.  Urine Studies:   No results for input(s): "COLORU", "APPEARANCEUA", "PHUR", "SPECGRAV", "PROTEINUA", "GLUCUA", "KETONESU", "BILIRUBINUA", "OCCULTUA", "NITRITE", "UROBILINOGEN", "LEUKOCYTESUR", "RBCUA", "WBCUA", "BACTERIA", "SQUAMEPITHEL", "HYALINECASTS" in the last 48 hours.    Invalid input(s): "WRIGHTSUR"    IMAGING  All imaging from the past 24 hours were reviewed.     Imaging Results              US Transplant Kidney With Doppler (Final result)  Result time 01/12/25 14:00:36   Procedure changed from US Retroperitoneal Complete     Final result by Joe Leach MD (01/12/25 14:00:36)                   Impression:      1.  Interval increase in main transplant renal artery velocities, currently measuring 170 centimeters/second (previously measuring 146 centimeters/second).  There is also interval increase in range of resistive indices in the segmental arteries, ranging between 0.82 and 0.88 (previously measuring 0.73-0.76).  This can be seen with infectious or inflammatory process, or early rejection changes.  Negative for hydronephrosis.  Negative for renal lesions.      Electronically signed by: Joe Leach MD  Date:    01/12/2025  Time:    14:00               Narrative:    EXAMINATION:  US TRANSPLANT KIDNEY WITH DOPPLER    CLINICAL HISTORY:  ANGELITO in a " transplant;    TECHNIQUE:  Transplant renal ultrasound of the right lower quadrant with color flow doppler and duplex analysis.    COMPARISON:  December 6, 2021    FINDINGS:  A transplanted kidney measures 9.5 centimeters in length.  Normal perfusion.  There is no hydronephrosis.No fluid collections.    Resistive indices ranged from 0.82 to 0.88.  Velocity in main renal artery is 170 cm/sec and the renal artery/iliac ratio is 0.9.  The main renal vein is patent.    The urinary bladder is contracted and thick walled.                                       X-Ray Chest AP Portable (Final result)  Result time 01/12/25 07:00:01      Final result by Joe Leach MD (01/12/25 07:00:01)                   Impression:      1.  Negative for acute process involving the chest.    2.  Stable findings as noted above.      Electronically signed by: Joe Leach MD  Date:    01/12/2025  Time:    07:00               Narrative:    EXAMINATION:  XR CHEST AP PORTABLE    CLINICAL HISTORY:  Weakness    COMPARISON:  August 22, 2023    FINDINGS:  The study is lordotic in position.  Chronically low lung volumes with elevation of the right hemidiaphragm.  The lungs are free of new pulmonary opacity.  The cardiac silhouette size is borderline enlarged.  The trachea is midline and the mediastinal width is normal. Negative for focal infiltrate, effusion or pneumothorax. Pulmonary vasculature is normal. Negative for osseous abnormalities. Convex right curvature of the thoracic spine with marginal spondylosis.  Tortuous aorta with calcifications of the aortic knob.  Stable vascular stent in the right axilla.                                       X-Ray Abdomen Flat And Erect (Final result)  Result time 01/12/25 06:56:43      Final result by Joe Leach MD (01/12/25 06:56:43)                   Impression:      1.  Negative for acute process.      Electronically signed by: Joe Leach MD  Date:    01/12/2025  Time:    06:56                Narrative:    EXAMINATION:  XR ABDOMEN FLAT AND ERECT    CLINICAL HISTORY:  Vomiting, unspecified    TECHNIQUE:  Flat and erect AP views of the abdomen were performed.    COMPARISON:  Abdomen and pelvis CT scan from April 14, 2024    FINDINGS:  Normal bowel gas pattern.  Negative for free air.    Stable degenerative changes of the spine and pelvis.  Lung bases are clear.                                        Assessment and Plan     * Severe physical deconditioning  01/17/2025  Patient requiring max assists with bed transitions and feeding. High intensity therapy recommended by therapists. Patient previously independent, living at home. Referrals for rehab placed at this time.      01/18/2025  Some notable objective clinical improvement. Reports decreased ROM in UE bilaterally but predominantly his right UE. Radiography of the left hand and R shoulder unremarkable. Will obtain CT cervical spine to further assess for stenosis.     01/19/2025  Cervical spine CT shows right moderate foraminal stenosis at C3-4 and C5-6 secondary to spurring of the uncovertebral joints.  Will obtain MRI of spine and consult orthospine to assess to see if findings could be contributing to his significant UE weakness and pain.         Tacrolimus-induced GI toxicity  Prograf level very high  Prograf on hold    Await repeat Prograf level    Repeat levels normal- will restart Prograf from am at 3 mg bid      Gross hematuria  Stable, has Purewick catheter now  Getting IVF, if gets worse, may need CBI    Still persists but a little better  If no improvement tomorrow- will try CBI    Improving with good Urine output, CBI not needed  Cont close monitoring      Improving, cont IVF    Hyperkalemia  Hyperkalemia is likely due to Increased potassium intake.The patients most recent potassium results are listed below.  Recent Labs     01/17/25  1207 01/18/25  0526 01/19/25  0611   K 4.5 4.0 4.1       01/19/2025  - resolved    CKD (chronic kidney  disease) stage 4, GFR 15-29 ml/min  Creatine stable for now. BMP reviewed- noted Estimated Creatinine Clearance: 38.2 mL/min (A) (based on SCr of 2.3 mg/dL (H)). according to latest data. Based on current GFR, CKD stage is stage 4 - GFR 15-29.  Monitor UOP and serial BMP and adjust therapy as needed. Renally dose meds. Avoid nephrotoxic medications and procedures.  Pt is d/p Renal Transplant in 2015, on Prograf and Cellcept    Edwina on CKD 4- sec to Prograf toxicity- Bleeding- started on IVF  Cont IVF  Improving with IVF, Cr coming down to 3    01/19/2025  Stable, IVFs dc  Immunosuppressant therapy restarted    ABL Anemia plus anemia of CKD 4      Anemia is likely due to acute blood loss which was from CKD, s/p Renal transplant and chronic disease due to Chronic Kidney Disease. Most recent hemoglobin and hematocrit are listed below.  Recent Labs     01/17/25  1207 01/18/25  0526 01/19/25  0611   HGB 8.7* 8.4* 8.6*   HCT 29.1* 28.7* 29.8*       01/19/2025  stable      VTE Risk Mitigation (From admission, onward)           Ordered     Reason for No Pharmacological VTE Prophylaxis  Once        Question:  Reasons:  Answer:  Active Bleeding    01/12/25 1729     IP VTE HIGH RISK PATIENT  Once         01/12/25 1729     Place sequential compression device  Until discontinued         01/12/25 1729                    Discharge Planning   GRETEL:      Code Status: Full Code   Medical Readiness for Discharge Date:   Discharge Plan A: Home with family, Home                Please place Justification for DME        Chidi James MD  Department of Hospital Medicine   '[place] - Telemetry ([facility])

## 2025-01-19 NOTE — ASSESSMENT & PLAN NOTE
Hyperkalemia is likely due to Increased potassium intake.The patients most recent potassium results are listed below.  Recent Labs     01/17/25  1207 01/18/25  0526 01/19/25  0611   K 4.5 4.0 4.1       01/19/2025  - resolved

## 2025-01-19 NOTE — ASSESSMENT & PLAN NOTE
01/17/2025  Patient requiring max assists with bed transitions and feeding. High intensity therapy recommended by therapists. Patient previously independent, living at home. Referrals for rehab placed at this time.      01/18/2025  Some notable objective clinical improvement. Reports decreased ROM in UE bilaterally but predominantly his right UE. Radiography of the left hand and R shoulder unremarkable. Will obtain CT cervical spine to further assess for stenosis.     01/19/2025  Cervical spine CT shows right moderate foraminal stenosis at C3-4 and C5-6 secondary to spurring of the uncovertebral joints.  Will obtain MRI of spine and consult orthospine to assess to see if findings could be contributing to his significant UE weakness and pain.

## 2025-01-19 NOTE — ASSESSMENT & PLAN NOTE
Creatine stable for now. BMP reviewed- noted Estimated Creatinine Clearance: 38.2 mL/min (A) (based on SCr of 2.3 mg/dL (H)). according to latest data. Based on current GFR, CKD stage is stage 4 - GFR 15-29.  Monitor UOP and serial BMP and adjust therapy as needed. Renally dose meds. Avoid nephrotoxic medications and procedures.  Pt is d/p Renal Transplant in 2015, on Prograf and Cellcept    Edwina on CKD 4- sec to Prograf toxicity- Bleeding- started on IVF  Cont IVF  Improving with IVF, Cr coming down to 3    01/19/2025  Stable, IVFs dc  Immunosuppressant therapy restarted

## 2025-01-20 ENCOUNTER — PATIENT MESSAGE (OUTPATIENT)
Dept: DIABETES | Facility: CLINIC | Age: 70
End: 2025-01-20
Payer: COMMERCIAL

## 2025-01-20 PROBLEM — E87.5 HYPERKALEMIA: Status: RESOLVED | Noted: 2025-01-13 | Resolved: 2025-01-20

## 2025-01-20 LAB
ALBUMIN SERPL BCP-MCNC: 1.2 G/DL (ref 3.5–5.2)
ANION GAP SERPL CALC-SCNC: 14 MMOL/L (ref 8–16)
ANION GAP SERPL CALC-SCNC: 14 MMOL/L (ref 8–16)
BASOPHILS NFR BLD: 0 % (ref 0–1.9)
BASOPHILS NFR BLD: 0 % (ref 0–1.9)
BUN SERPL-MCNC: 50 MG/DL (ref 8–23)
BUN SERPL-MCNC: 50 MG/DL (ref 8–23)
CALCIUM SERPL-MCNC: 9.7 MG/DL (ref 8.7–10.5)
CALCIUM SERPL-MCNC: 9.7 MG/DL (ref 8.7–10.5)
CHLORIDE SERPL-SCNC: 108 MMOL/L (ref 95–110)
CHLORIDE SERPL-SCNC: 108 MMOL/L (ref 95–110)
CO2 SERPL-SCNC: 17 MMOL/L (ref 23–29)
CO2 SERPL-SCNC: 17 MMOL/L (ref 23–29)
CREAT SERPL-MCNC: 2.5 MG/DL (ref 0.5–1.4)
CREAT SERPL-MCNC: 2.5 MG/DL (ref 0.5–1.4)
DACRYOCYTES BLD QL SMEAR: ABNORMAL
DACRYOCYTES BLD QL SMEAR: ABNORMAL
DIFFERENTIAL METHOD BLD: ABNORMAL
DIFFERENTIAL METHOD BLD: ABNORMAL
EOSINOPHIL NFR BLD: 0 % (ref 0–8)
EOSINOPHIL NFR BLD: 0 % (ref 0–8)
ERYTHROCYTE [DISTWIDTH] IN BLOOD BY AUTOMATED COUNT: 14.6 % (ref 11.5–14.5)
ERYTHROCYTE [DISTWIDTH] IN BLOOD BY AUTOMATED COUNT: 14.6 % (ref 11.5–14.5)
EST. GFR  (NO RACE VARIABLE): 27 ML/MIN/1.73 M^2
EST. GFR  (NO RACE VARIABLE): 27 ML/MIN/1.73 M^2
GLUCOSE SERPL-MCNC: 198 MG/DL (ref 70–110)
GLUCOSE SERPL-MCNC: 198 MG/DL (ref 70–110)
HCT VFR BLD AUTO: 29.1 % (ref 40–54)
HCT VFR BLD AUTO: 29.1 % (ref 40–54)
HGB BLD-MCNC: 8.3 G/DL (ref 14–18)
HGB BLD-MCNC: 8.3 G/DL (ref 14–18)
IMM GRANULOCYTES # BLD AUTO: ABNORMAL K/UL (ref 0–0.04)
IMM GRANULOCYTES # BLD AUTO: ABNORMAL K/UL (ref 0–0.04)
IMM GRANULOCYTES NFR BLD AUTO: ABNORMAL % (ref 0–0.5)
IMM GRANULOCYTES NFR BLD AUTO: ABNORMAL % (ref 0–0.5)
LYMPHOCYTES NFR BLD: 16 % (ref 18–48)
LYMPHOCYTES NFR BLD: 16 % (ref 18–48)
MCH RBC QN AUTO: 24 PG (ref 27–31)
MCH RBC QN AUTO: 24 PG (ref 27–31)
MCHC RBC AUTO-ENTMCNC: 28.5 G/DL (ref 32–36)
MCHC RBC AUTO-ENTMCNC: 28.5 G/DL (ref 32–36)
MCV RBC AUTO: 84 FL (ref 82–98)
MCV RBC AUTO: 84 FL (ref 82–98)
METAMYELOCYTES NFR BLD MANUAL: 1 %
METAMYELOCYTES NFR BLD MANUAL: 1 %
MONOCYTES NFR BLD: 18 % (ref 4–15)
MONOCYTES NFR BLD: 18 % (ref 4–15)
NEUTROPHILS NFR BLD: 63 % (ref 38–73)
NEUTROPHILS NFR BLD: 63 % (ref 38–73)
NEUTS BAND NFR BLD MANUAL: 2 %
NEUTS BAND NFR BLD MANUAL: 2 %
NRBC BLD-RTO: 0 /100 WBC
NRBC BLD-RTO: 0 /100 WBC
OHS QRS DURATION: 146 MS
OHS QTC CALCULATION: 495 MS
OVALOCYTES BLD QL SMEAR: ABNORMAL
OVALOCYTES BLD QL SMEAR: ABNORMAL
PHOSPHATE SERPL-MCNC: 5.7 MG/DL (ref 2.7–4.5)
PLATELET # BLD AUTO: 261 K/UL (ref 150–450)
PLATELET # BLD AUTO: 261 K/UL (ref 150–450)
PLATELET BLD QL SMEAR: ABNORMAL
PLATELET BLD QL SMEAR: ABNORMAL
PMV BLD AUTO: 10.4 FL (ref 9.2–12.9)
PMV BLD AUTO: 10.4 FL (ref 9.2–12.9)
POCT GLUCOSE: 204 MG/DL (ref 70–110)
POCT GLUCOSE: 257 MG/DL (ref 70–110)
POCT GLUCOSE: 269 MG/DL (ref 70–110)
POCT GLUCOSE: 285 MG/DL (ref 70–110)
POTASSIUM SERPL-SCNC: 4.4 MMOL/L (ref 3.5–5.1)
POTASSIUM SERPL-SCNC: 4.4 MMOL/L (ref 3.5–5.1)
RBC # BLD AUTO: 3.46 M/UL (ref 4.6–6.2)
RBC # BLD AUTO: 3.46 M/UL (ref 4.6–6.2)
SODIUM SERPL-SCNC: 139 MMOL/L (ref 136–145)
SODIUM SERPL-SCNC: 139 MMOL/L (ref 136–145)
TACROLIMUS BLD-MCNC: 8.1 NG/ML (ref 5–15)
VIT B12 SERPL-MCNC: >2000 PG/ML (ref 210–950)
WBC # BLD AUTO: 2.94 K/UL (ref 3.9–12.7)
WBC # BLD AUTO: 2.94 K/UL (ref 3.9–12.7)

## 2025-01-20 PROCEDURE — 21400001 HC TELEMETRY ROOM

## 2025-01-20 PROCEDURE — 36415 COLL VENOUS BLD VENIPUNCTURE: CPT | Performed by: NURSE PRACTITIONER

## 2025-01-20 PROCEDURE — 25000003 PHARM REV CODE 250: Performed by: STUDENT IN AN ORGANIZED HEALTH CARE EDUCATION/TRAINING PROGRAM

## 2025-01-20 PROCEDURE — 80197 ASSAY OF TACROLIMUS: CPT | Performed by: INTERNAL MEDICINE

## 2025-01-20 PROCEDURE — 25000003 PHARM REV CODE 250: Performed by: PHYSICIAN ASSISTANT

## 2025-01-20 PROCEDURE — 97535 SELF CARE MNGMENT TRAINING: CPT

## 2025-01-20 PROCEDURE — 82607 VITAMIN B-12: CPT | Performed by: NURSE PRACTITIONER

## 2025-01-20 PROCEDURE — 85027 COMPLETE CBC AUTOMATED: CPT | Performed by: STUDENT IN AN ORGANIZED HEALTH CARE EDUCATION/TRAINING PROGRAM

## 2025-01-20 PROCEDURE — 99232 SBSQ HOSP IP/OBS MODERATE 35: CPT | Mod: ,,, | Performed by: INTERNAL MEDICINE

## 2025-01-20 PROCEDURE — 92526 ORAL FUNCTION THERAPY: CPT

## 2025-01-20 PROCEDURE — 93005 ELECTROCARDIOGRAM TRACING: CPT

## 2025-01-20 PROCEDURE — 63600175 PHARM REV CODE 636 W HCPCS: Performed by: EMERGENCY MEDICINE

## 2025-01-20 PROCEDURE — 80069 RENAL FUNCTION PANEL: CPT | Performed by: INTERNAL MEDICINE

## 2025-01-20 PROCEDURE — 36415 COLL VENOUS BLD VENIPUNCTURE: CPT | Performed by: INTERNAL MEDICINE

## 2025-01-20 PROCEDURE — A4216 STERILE WATER/SALINE, 10 ML: HCPCS | Performed by: EMERGENCY MEDICINE

## 2025-01-20 PROCEDURE — 97110 THERAPEUTIC EXERCISES: CPT

## 2025-01-20 PROCEDURE — 51798 US URINE CAPACITY MEASURE: CPT

## 2025-01-20 PROCEDURE — 63600175 PHARM REV CODE 636 W HCPCS: Performed by: STUDENT IN AN ORGANIZED HEALTH CARE EDUCATION/TRAINING PROGRAM

## 2025-01-20 PROCEDURE — 25000003 PHARM REV CODE 250: Performed by: HOSPITALIST

## 2025-01-20 PROCEDURE — 93010 ELECTROCARDIOGRAM REPORT: CPT | Mod: ,,, | Performed by: INTERNAL MEDICINE

## 2025-01-20 PROCEDURE — 85007 BL SMEAR W/DIFF WBC COUNT: CPT | Performed by: STUDENT IN AN ORGANIZED HEALTH CARE EDUCATION/TRAINING PROGRAM

## 2025-01-20 PROCEDURE — 25000003 PHARM REV CODE 250: Performed by: EMERGENCY MEDICINE

## 2025-01-20 RX ADMIN — GABAPENTIN 300 MG: 300 CAPSULE ORAL at 04:01

## 2025-01-20 RX ADMIN — MYCOPHENOLATE MOFETIL 500 MG: 500 TABLET, FILM COATED ORAL at 10:01

## 2025-01-20 RX ADMIN — INSULIN ASPART 2 UNITS: 100 INJECTION, SOLUTION INTRAVENOUS; SUBCUTANEOUS at 02:01

## 2025-01-20 RX ADMIN — METOPROLOL SUCCINATE 25 MG: 25 TABLET, FILM COATED, EXTENDED RELEASE ORAL at 09:01

## 2025-01-20 RX ADMIN — NYSTATIN 500000 UNITS: 100000 SUSPENSION ORAL at 01:01

## 2025-01-20 RX ADMIN — INSULIN GLARGINE 10 UNITS: 100 INJECTION, SOLUTION SUBCUTANEOUS at 09:01

## 2025-01-20 RX ADMIN — NYSTATIN 500000 UNITS: 100000 SUSPENSION ORAL at 06:01

## 2025-01-20 RX ADMIN — INSULIN ASPART 3 UNITS: 100 INJECTION, SOLUTION INTRAVENOUS; SUBCUTANEOUS at 07:01

## 2025-01-20 RX ADMIN — LIDOCAINE 5% 2 PATCH: 700 PATCH TOPICAL at 09:01

## 2025-01-20 RX ADMIN — NYSTATIN 500000 UNITS: 100000 SUSPENSION ORAL at 09:01

## 2025-01-20 RX ADMIN — Medication 10 ML: at 04:01

## 2025-01-20 RX ADMIN — MYCOPHENOLATE MOFETIL 500 MG: 500 TABLET, FILM COATED ORAL at 09:01

## 2025-01-20 RX ADMIN — PANTOPRAZOLE SODIUM 40 MG: 40 TABLET, DELAYED RELEASE ORAL at 09:01

## 2025-01-20 RX ADMIN — INSULIN ASPART 4 UNITS: 100 INJECTION, SOLUTION INTRAVENOUS; SUBCUTANEOUS at 05:01

## 2025-01-20 RX ADMIN — PREDNISONE 5 MG: 5 TABLET ORAL at 09:01

## 2025-01-20 RX ADMIN — NYSTATIN 500000 UNITS: 100000 SUSPENSION ORAL at 11:01

## 2025-01-20 RX ADMIN — Medication 10 ML: at 09:01

## 2025-01-20 RX ADMIN — GABAPENTIN 300 MG: 300 CAPSULE ORAL at 09:01

## 2025-01-20 RX ADMIN — INSULIN ASPART 3 UNITS: 100 INJECTION, SOLUTION INTRAVENOUS; SUBCUTANEOUS at 06:01

## 2025-01-20 RX ADMIN — TACROLIMUS 3 MG: 1 CAPSULE ORAL at 05:01

## 2025-01-20 RX ADMIN — TACROLIMUS 3 MG: 1 CAPSULE ORAL at 09:01

## 2025-01-20 RX ADMIN — Medication 10 ML: at 05:01

## 2025-01-20 NOTE — ASSESSMENT & PLAN NOTE
Creatine stable for now. BMP reviewed- noted Estimated Creatinine Clearance: 35.2 mL/min (A) (based on SCr of 2.5 mg/dL (H)). according to latest data. Based on current GFR, CKD stage is stage 4 - GFR 15-29.  Monitor UOP and serial BMP and adjust therapy as needed. Renally dose meds. Avoid nephrotoxic medications and procedures.  Pt is d/p Renal Transplant in 2015, on Prograf and Cellcept    Edwina on CKD 4- sec to Prograf toxicity- Bleeding- started on IVF  Cont IVF  Improving with IVF, Cr coming down to 3    01/20/2025  Stable  Immunosuppressant therapy restarted

## 2025-01-20 NOTE — PLAN OF CARE
A242/A242 SB Whittaker is a 69 y.o.male admitted on 1/12/2025 for Physical deconditioning   Code Status: Full Code MRN: [id number]   Review of patient's allergies indicates:   Allergen Reactions    Lisinopril Other (See Comments)     Other reaction(s):  cough    Actos  [pioglitazone] Other (See Comments)     Other reaction(s): CHF    Metformin Other (See Comments)     Other reaction(s): renal insuff  Other reaction(s): CHF     Past Medical History:   Diagnosis Date    Acquired renal cyst of left kidney     Anemia associated with chronic renal failure     CAD (coronary artery disease)     nonobstructive [facility] 9/14    CHF (congestive heart failure)     Chronic immunosuppression with Prograf and MMF 06/18/2015    Chronic venous insufficiency of lower extremity     CKD (chronic kidney disease) stage 3, GFR 30-59 ml/min     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 RNA NEGATIVE 5/2015, 2021, 2022    History of colon polyps     HPTH (hyperparathyroidism)     Hyperlipidemia     Hypertension associated with stage 3 chronic kidney disease due to type 2 diabetes mellitus     LBBB (left bundle branch block) 12/20/2021    Morbid obesity with BMI of 45.0-49.9, adult     Nephrolithiasis 6/7/2013    PCO (posterior capsular opacification), left 03/04/2019    Proteinuria     resolved s/p transplant    S/P kidney transplant     Sleep apnea     Type 2 diabetes, uncontrolled, with retinopathy     Type II diabetes mellitus with renal manifestations       PRN meds    0.9%  NaCl infusion (for blood administration), , Q24H PRN  acetaminophen, 650 mg, Q4H PRN  dextrose 50%, 12.5 g, PRN  dextrose 50%,  25 g, PRN  glucagon (human recombinant), 1 mg, PRN  glucose, 16 g, PRN  glucose, 24 g, PRN  HYDROcodone-acetaminophen, 1 tablet, Q6H PRN  insulin aspart U-100, 0-10 Units, QID (AC + HS) PRN  melatonin, 6 mg, Nightly PRN  metoprolol, 5 mg, Q6H PRN  naloxone, 0.02 mg, PRN  ondansetron, 8 mg, Q8H PRN  ondansetron, 4 mg, Q8H PRN  polyethylene glycol, 17 g, Daily PRN  senna-docusate 8.6-50 mg, 2 tablet, Daily PRN      Chart check completed. Will continue plan of care.      Orientation: oriented x 4  [place]oni Coma Scale Score: 15     Lead Monitored: Lead II Rhythm: sinus tachycardia Frequency/Ectopy: PVCs  Cardiac/Telemetry Box Number: [id number]  VTE Core Measure: (TEDs) Compression stocking therapy initiated/maintained Last Bowel Movement: 01/18/25  Diet Renal Minced & Moist (IDDSI Level 5), Renal; Standard Tray  Voiding Characteristics: external catheter  Dewayne Score: 12  Fall Risk Score: 20  Accucheck []   Freq?      Lines/Drains/Airways       Drain  Duration             Male External Urinary Catheter 01/13/25 0900 6 days              Peripheral Intravenous Line  Duration                  Hemodialysis AV Fistula Right upper arm -- days         Peripheral IV - Single Lumen 01/16/25 1820 18 G Left;Posterior Hand 2 days

## 2025-01-20 NOTE — PLAN OF CARE
01/20/25 1401   Rounds   Attendance Provider;;Charge nurse;Physical therapist   Discharge Plan A Other  (Pending Progression)   Why the patient remains in the hospital Requires continued medical care   Transition of Care Barriers None       Patient required repeat CT of head, pending results.  Therapy recommends IP Rehab vs. SNF.   Pending improvement.

## 2025-01-20 NOTE — PROGRESS NOTES
Nephrology Progress Note     History of Present Illness     Mr. Whittaker is a 69 year  male with a hx of previous ESRD likely related to diabetes and hypertension that was on dialysis several years prior to receiving a living related kidney transplant from his daughter in 2015.  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  He is followed by Dr. Gonsalez of [facility] seen last on 09/24/2024 at which time his serum creatinine was 2.2.     He was seen in the emergency department on 01/06/2025 complaining of increasing lower extremity edema.  At that time his serum creatinine was 2.8.  He returns to the emergency department 01/12/2025 with persistent lower extremity edema and associated blistering.  He also complains of bilateral knee pain to the point where he is unable to ambulate.  He attributes this to the change in weather.  His admission laboratory reveals a serum creatinine now up to 4.3 with a potassium of 5.4.  Nephrology has been asked to see and evaluate the patient.     As an outpatient he is chronically on Lasix 40 mg twice a day.  He denies the use of nonsteroidal anti-inflammatory drugs.  He denies dysuria hematuria or difficulty voiding.  He is chronically on Entresto.    Interval History   Overnight/currently: He denies being short of breath. Creatinine relatively stable at 2.5. Prograf level came back at 8.1 though it was drawn a bit early and not a true trough.        Allergies:    is allergic to lisinopril, actos  [pioglitazone], and metformin.    Current medications:   Scheduled Meds:   gabapentin  300 mg Oral TID    insulin glargine U-100  10 Units Subcutaneous BID    LIDOcaine  2 patch Transdermal Q24H    metoprolol succinate  25 mg Oral Daily     "mycophenolate  500 mg Oral BID    nystatin  500,000 Units Oral QID    pantoprazole  40 mg Oral BID    predniSONE  5 mg Oral Daily    sodium chloride 0.9%  10 mL Intravenous Q8H    tacrolimus  3 mg Oral BID     Continuous Infusions:  PRN Meds:.  Current Facility-Administered Medications:     0.9%  NaCl infusion (for blood administration), , Intravenous, Q24H PRN    acetaminophen, 650 mg, Oral, Q4H PRN    dextrose 50%, 12.5 g, Intravenous, PRN    dextrose 50%, 25 g, Intravenous, PRN    glucagon (human recombinant), 1 mg, Intramuscular, PRN    glucose, 16 g, Oral, PRN    glucose, 24 g, Oral, PRN    HYDROcodone-acetaminophen, 1 tablet, Oral, Q6H PRN    insulin aspart U-100, 0-10 Units, Subcutaneous, QID (AC + HS) PRN    melatonin, 6 mg, Oral, Nightly PRN    metoprolol, 5 mg, Intravenous, Q6H PRN    naloxone, 0.02 mg, Intravenous, PRN    ondansetron, 8 mg, Oral, Q8H PRN    ondansetron, 4 mg, Intravenous, Q8H PRN    polyethylene glycol, 17 g, Oral, Daily PRN    senna-docusate 8.6-50 mg, 2 tablet, Oral, Daily PRN     Physical Examination     VS/Measurements    BP (!) 114/58 (BP Location: Left arm, Patient Position: Lying)   Pulse 86   Temp 96.1 °F (35.6 °C) (Temporal) Comment: Cr. Jacob notified  Resp 20   Ht 5' 7" (1.702 m)   Wt 130.2 kg (287 lb 0.6 oz)   SpO2 95%   BMI 44.96 kg/m²         General:  Moderately built, not in any distress.  Neck:  Supple,   Respiratory: Non-labored,  Lungs are clear to auscultation.    Cardiovascular:  Normal rate, Regular rhythm.   Abdomen:  Soft, Non-tender, Normal bowel sounds.   Muskuloskeletal:  + pedal edema          Laboratory Results   Today's Lab Results :    Recent Results (from the past 24 hours)   Lactic acid, plasma    Collection Time: 01/19/25  3:30 PM   Result Value Ref Range    Lactate (Lactic Acid) 0.9 0.5 - 2.2 mmol/L   POCT glucose    Collection Time: 01/19/25  5:28 PM   Result Value Ref Range    POCT Glucose 200 (H) 70 - 110 mg/dL   POCT glucose    Collection " Time: 01/19/25  9:33 PM   Result Value Ref Range    POCT Glucose 189 (H) 70 - 110 mg/dL   CBC Auto Differential    Collection Time: 01/20/25  4:58 AM   Result Value Ref Range    WBC 2.94 (L) 3.90 - 12.70 K/uL    RBC 3.46 (L) 4.60 - 6.20 M/uL    Hemoglobin 8.3 (L) 14.0 - 18.0 g/dL    Hematocrit 29.1 (L) 40.0 - 54.0 %    MCV 84 82 - 98 fL    MCH 24.0 (L) 27.0 - 31.0 pg    MCHC 28.5 (L) 32.0 - 36.0 g/dL    RDW 14.6 (H) 11.5 - 14.5 %    Platelets 261 150 - 450 K/uL    MPV 10.4 9.2 - 12.9 fL    Immature Granulocytes CANCELED 0.0 - 0.5 %    Immature Grans (Abs) CANCELED 0.00 - 0.04 K/uL    nRBC 0 0 /100 WBC    Gran % 63.0 38.0 - 73.0 %    Lymph % 16.0 (L) 18.0 - 48.0 %    Mono % 18.0 (H) 4.0 - 15.0 %    Eosinophil % 0.0 0.0 - 8.0 %    Basophil % 0.0 0.0 - 1.9 %    Bands 2.0 %    Metamyelocytes 1.0 %    Platelet Estimate Appears normal     Ovalocytes Occasional     Tear Drop Cells Occasional     Differential Method Manual    Basic Metabolic Panel    Collection Time: 01/20/25  4:58 AM   Result Value Ref Range    Sodium 139 136 - 145 mmol/L    Potassium 4.4 3.5 - 5.1 mmol/L    Chloride 108 95 - 110 mmol/L    CO2 17 (L) 23 - 29 mmol/L    Glucose 198 (H) 70 - 110 mg/dL    BUN 50 (H) 8 - 23 mg/dL    Creatinine 2.5 (H) 0.5 - 1.4 mg/dL    Calcium 9.7 8.7 - 10.5 mg/dL    Anion Gap 14 8 - 16 mmol/L    eGFR 27 (A) >60 mL/min/1.73 m^2   CBC Auto Differential    Collection Time: 01/20/25  4:58 AM   Result Value Ref Range    WBC 2.94 (L) 3.90 - 12.70 K/uL    RBC 3.46 (L) 4.60 - 6.20 M/uL    Hemoglobin 8.3 (L) 14.0 - 18.0 g/dL    Hematocrit 29.1 (L) 40.0 - 54.0 %    MCV 84 82 - 98 fL    MCH 24.0 (L) 27.0 - 31.0 pg    MCHC 28.5 (L) 32.0 - 36.0 g/dL    RDW 14.6 (H) 11.5 - 14.5 %    Platelets 261 150 - 450 K/uL    MPV 10.4 9.2 - 12.9 fL    Immature Granulocytes CANCELED 0.0 - 0.5 %    Immature Grans (Abs) CANCELED 0.00 - 0.04 K/uL    nRBC 0 0 /100 WBC    Gran % 63.0 38.0 - 73.0 %    Lymph % 16.0 (L) 18.0 - 48.0 %    Mono % 18.0 (H) 4.0 -  15.0 %    Eosinophil % 0.0 0.0 - 8.0 %    Basophil % 0.0 0.0 - 1.9 %    Bands 2.0 %    Metamyelocytes 1.0 %    Platelet Estimate Appears normal     Ovalocytes Occasional     Tear Drop Cells Occasional     Differential Method Manual    Renal Function Panel    Collection Time: 01/20/25  4:58 AM   Result Value Ref Range    Glucose 198 (H) 70 - 110 mg/dL    Sodium 139 136 - 145 mmol/L    Potassium 4.4 3.5 - 5.1 mmol/L    Chloride 108 95 - 110 mmol/L    CO2 17 (L) 23 - 29 mmol/L    BUN 50 (H) 8 - 23 mg/dL    Calcium 9.7 8.7 - 10.5 mg/dL    Creatinine 2.5 (H) 0.5 - 1.4 mg/dL    Albumin 1.2 (L) 3.5 - 5.2 g/dL    Phosphorus 5.7 (H) 2.7 - 4.5 mg/dL    eGFR 27 (A) >60 mL/min/1.73 m^2    Anion Gap 14 8 - 16 mmol/L   Tacrolimus Level    Collection Time: 01/20/25  4:58 AM   Result Value Ref Range    Tacrolimus Lvl 8.1 5.0 - 15.0 ng/mL   POCT glucose    Collection Time: 01/20/25  5:29 AM   Result Value Ref Range    POCT Glucose 204 (H) 70 - 110 mg/dL   POCT glucose    Collection Time: 01/20/25  1:59 PM   Result Value Ref Range    POCT Glucose 269 (H) 70 - 110 mg/dL       LABS:  Reviewed Yes      Intake/Output Summary (Last 24 hours) at 1/20/2025 1504  Last data filed at 1/20/2025 0852  Gross per 24 hour   Intake 480 ml   Output 650 ml   Net -170 ml       Assessment and Plan     ANGELITO/CKD stage 4 in his transplant kidney.  [place] to be secondary to possible intravascular volume depletion complicated by Prograf toxicity.  Creatinine dial back within baseline.    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  Chronic allograft nephropathy.  Followed by Dr. Gonsalez.  As above.  Continue immunosuppression with CellCept, Prograf and prednisone.  Current Prograf trough level likely drawn a bit early and not accurate.  Given his time out from transplant and the fact that he has a living related donor kidney transplant, a Prograf trough level of 4-6 would be adequate.  Repeat in a couple of days.    Gross hematuria.  Likely Ann trauma.  Anticoagulants  on hold.    Hyperkalemia.  This has resolved with Lokelma as well as holding his Entresto and potassium supplements.    Hypercalcemia.  Corrected calcium 11.94.  Serum and urine protein electrophoresis pending.  He has been on calcitriol outpatient which is being held here.  Avoid calcium and vitamin-D.    Melena.  Resolved.  GI following.      Anemia.  Hematuria versus possible GI bleed while on Eliquis and aspirin.  Status post 1 unit of PRBCs and hemoglobin relatively stable at this point.    Hypertension CKD.  Blood pressure at goal.      HFrEF.  Currently compensated.  EF 35% 11/2024.  Followed by Dr. Man with Cardiology.    Diabetes mellitus type 2.  Defer to Hospital Medicine.    ________________________________________________  Elias Watkins

## 2025-01-20 NOTE — ASSESSMENT & PLAN NOTE
01/17/2025  Patient requiring max assists with bed transitions and feeding. High intensity therapy recommended by therapists. Patient previously independent, living at home. Referrals for rehab placed at this time.      01/18/2025  Some notable objective clinical improvement. Reports decreased ROM in UE bilaterally but predominantly his right UE. Radiography of the left hand and R shoulder unremarkable. Will obtain CT cervical spine to further assess for stenosis.     01/19/2025  Cervical spine CT shows right moderate foraminal stenosis at C3-4 and C5-6 secondary to spurring of the uncovertebral joints.  Will obtain MRI of spine and consult orthospine to assess to see if findings could be contributing to his significant UE weakness and pain.     1/20/2025  Patient's daughter tells me patient independently prior to hospitalization. Patient complains of upper and lower bilateral weakness. While Prograf toxicity could play a part in progressive decline, he will need additional work up. CT head unremarkable. Kidney function has returned to baseline.  Vitamin B12 pending. Neurosurgery/Neurology consult pending. He is awaiting rehab vs SNF placement.

## 2025-01-20 NOTE — PLAN OF CARE
Plan of care reviewed with patient with verbal understanding. Chart and orders reviewed.  Pt remains free from falls this shift, Safety precautions in place.   Pt currently resting comfortably in bed. Pain controlled with po pain med (see emar for pain admin).  Purposeful rounding with bed in lowest position, side rails up, call light in reach.  Will continue to monitor until end of shift.       Problem: Adult Inpatient Plan of Care  Goal: Plan of Care Review  Outcome: Progressing  Flowsheets (Taken 1/20/2025 0253)  Plan of Care Reviewed With: patient  Goal: Patient-Specific Goal (Individualized)  Outcome: Progressing  Flowsheets (Taken 1/20/2025 0253)  Individualized Care Needs: Pt is a turn q2h.  Anxieties, Fears or Concerns: Pt is worried about always hurting when touched.  Patient/Family-Specific Goals (Include Timeframe): The pt will be hemodynamically stable and physically stronger by end of admission.  Goal: Optimal Comfort and Wellbeing  Outcome: Progressing     Problem: Bariatric Environmental Safety  Goal: Safety Maintained with Care  Outcome: Progressing     Problem: Diabetes Comorbidity  Goal: Blood Glucose Level Within Targeted Range  Outcome: Progressing     Problem: Wound  Goal: Optimal Coping  Outcome: Progressing     Problem: Skin Injury Risk Increased  Goal: Skin Health and Integrity  Outcome: Progressing     Problem: Pain Acute  Goal: Optimal Pain Control and Function  Outcome: Progressing

## 2025-01-20 NOTE — PLAN OF CARE
TX COMPLETED: Fatigue following CT scan, increased cuing to remain awake and keep eyes open but able to participate in supine therex. Recommend continued PT services

## 2025-01-20 NOTE — PROGRESS NOTES
[facility] Medicine  Progress Note    Patient Name: Mitch Whittaker  MRN: [id number]  Patient Class: IP- Inpatient   Admission Date: 1/12/2025  Length of Stay: 8 days  Attending Physician: Carlos Mathew MD  Primary Care Provider: Valery Caal MD        Subjective     Principal Problem:Physical deconditioning        HPI:  Mitch Whittaker is a 69 y.o. male patient with a PMHx of CHF- EF 40%, anemia, hyperparathyroidism, type 2 DM, s/p kidney transplant 2015 on Prograf and Cellcept, DVT on Eliquis, MARION, HLD, HTN, CKD, Hep C, and arthritis who presents to the Emergency Department for evaluation of nausea vomiting and diarrhea over the past several days (both of which have improved), but felt weak and couldn't move around like normal. no abd pain, no systemic symptoms, stool now formed. Pt is a very poor historian. According to nursing staff, family reported that the pt has been sitting in his chair for the last 3 days. Pt reports that he has had two episodes of diarrhea since yesterday, but due to the increasing weakness in his knees he was unable to get up from his chair. Pt normally ambulates with assistance from a walker. Symptoms are constant and moderate in severity. Associated sxs include blood in stool (x4 days) and n/v yesterday, but none today after PO. Patient denies any fever, abdominal pain, appetite changes, SOB, dysuria, and all other sxs at this time. No prior tx. Pt is on Eliquis. No further complaints or concerns at this time.   In the ER, VS /65, , Sats 100% on RA, Afeb, WBC 3.7, H/H 9.5/33, Bun/Cr 82/4.3, K 6.7- treated aggressively in the ER and rechecked and repeat 5.4. He is heme positive as well. C Diff Neg. She is being admitted for possible Hyperkalemia, acute GI Bleed, ANGELITO.     Overview/Hospital Course:  69 y.o. male patient with a PMHx of CHF- EF 40%, anemia, hyperparathyroidism, type 2 DM, s/p kidney transplant 2015 on Prograf and Cellcept, DVT  on Eliquis, MARION, HLD, HTN, CKD, Hep C, and arthritis who presents to the Emergency Department for evaluation of nausea vomiting and diarrhea over the past several days (both of which have improved), but felt weak and couldn't move around like normal. no abd pain, no systemic symptoms, stool now formed. Pt is a very poor historian. According to nursing staff, family reported that the pt has been sitting in his chair for the last 3 days. Pt reports that he has had two episodes of diarrhea since yesterday, but due to the increasing weakness in his knees he was unable to get up from his chair. Pt normally ambulates with assistance from a walker. Symptoms are constant and moderate in severity. Associated sxs include blood in stool (x4 days) and n/v yesterday, but none today after PO. Patient denies any fever, abdominal pain, appetite changes, SOB, dysuria, and all other sxs at this time. No prior tx. Pt is on Eliquis. No further complaints or concerns at this time.   In the ER, VS /65, , Sats 100% on RA, Afeb, WBC 3.7, H/H 9.5/33, Bun/Cr 82/4.3, K 6.7- treated aggressively in the ER and rechecked and repeat 5.4. He is heme positive as well. C Diff Neg. She is being admitted for possible Hyperkalemia, acute GI Bleed, ANGELITO.     1/13- Appreciate Renal and GI input- pt looks and feels a little better, stronger, more alert and responsive. Wife and daughter are here. His prograf level very high- 32- hence Held. It seems that he was getting Prograf toxicity at home which then resulted in Hematuria, ABL Anemia, ANGELITO and Hyperkalemia. Pt also felt weak and low sugars, so drank a lot of OJ which further raised his K level. Does not appears to have true GI bleed. But has hematuria- hence Purewick catheter placed and Dr. Bennett also started him on IVF- hematuria appears to be clearing. Pt feeling better, will check labs in am and start PT/OT. K was 6.1 this am- started on lokelma.     1/14- looks a little better, no  melena but still has hematuria. H/h stable, 8.3/28, K still 6, Bun.Cr still high 83/4. Repeat Prograf level pending. VSS Afeb, he is more alert and talking. Wound care wrapped his legs with moderate compression. Had mod R SFA stenosis, vascular evaluated him and will continue to monitor him, no surgery or angioplasty needed at this time. Will start PT/OT in am. Cont IVF, If Hematuria persists, start CBI in am.     1/15- looks better but c/o pain in his legs which are in a compression socks. Good response to iVF, Hematuria getting better and Cr down to 3 now with good urine output. H/H dropped to 7.2/22 sec to IVF and Hematuria. Still await repeat prograf level. Getting PT/OT, started on Norco for pain control.      1/16- resting quietly, comfortable, making good amount of urine, hematuria almost cleared with IVF. Bun/Cr down to 58/2.9. K normal, lokelm d/koki. H/H improved to 7.7/26. Prograf noiw 5.2, will restart at 3 mg bid. Stop hydration in am, start PT/OT. D/c home soon.     01/17/2025  Patient requiring max assists with bed transitions and feeding. High intensity therapy recommended by therapists. Patient previously independent, living at home. Referrals for rehab placed at this time.      01/18/2025  Some notable objective clinical improvement. Reports decreased ROM in UE bilaterally but predominantly his right UE. Radiography of the left hand and R shoulder unremarkable. Will obtain CT cervical spine to further assess for stenosis.     01/19/2025  Cervical spine CT shows right moderate foraminal stenosis at C3-4 and C5-6 secondary to spurring of the uncovertebral joints.  Will obtain MRI of spine and consult orthospine to assess to see if findings could be contributing to his significant UE weakness and pain.     1/20/2025:   MRI Cervical/Thoracic/Lumbar showed multilevel spondylosis mild to moderate but no acute findings. Patient's daughter tells me patient independently prior to hospitalization. Patient  complains of upper and lower bilateral weakness. While Prograf toxicity could play a part in progressive decline, he will need additional work up. CT head unremarkable. Kidney function has returned to baseline.  Vitamin B12 pending. Neurosurgery/Neurology consult pending. He is awaiting rehab vs SNF placement.     Interval History: See hospital course.     Review of Systems   Constitutional:  Positive for activity change and appetite change. Negative for fever.   HENT:  Negative for hearing loss.    Eyes:  Negative for visual disturbance.   Respiratory:  Negative for cough and shortness of breath.    Cardiovascular:  Positive for leg swelling. Negative for chest pain (mild nagging pain in midline) and palpitations.   Genitourinary:  Negative for difficulty urinating, dysuria, frequency and hematuria.   Musculoskeletal:  Negative for arthralgias and back pain.        <ROM to lower extremities   Skin:  Positive for wound. Negative for color change and pallor.   Neurological:  Positive for weakness. Negative for seizures, syncope, numbness and headaches.   Hematological:  Does not bruise/bleed easily.   Psychiatric/Behavioral:  The patient is not nervous/anxious.      Objective:     Vital Signs (Most Recent):  Temp: 96.1 °F (35.6 °C) (Cr. Jacob notified) (01/20/25 0859)  Pulse: 86 (01/20/25 1148)  Resp: 20 (01/20/25 1148)  BP: (!) 114/58 (01/20/25 1148)  SpO2: 95 % (01/20/25 1148) Vital Signs (24h Range):  Temp:  [96.1 °F (35.6 °C)-99 °F (37.2 °C)] 96.1 °F (35.6 °C)  Pulse:  [] 86  Resp:  [18-22] 20  SpO2:  [92 %-99 %] 95 %  BP: (114-147)/(57-71) 114/58     Weight: 130.2 kg (287 lb 0.6 oz)  Body mass index is 44.96 kg/m².    Intake/Output Summary (Last 24 hours) at 1/20/2025 1442  Last data filed at 1/20/2025 0852  Gross per 24 hour   Intake 480 ml   Output 650 ml   Net -170 ml         Physical Exam  Constitutional:       General: He is not in acute distress.     Appearance: Normal appearance. He is  well-developed. He is obese. He is ill-appearing. He is not toxic-appearing or diaphoretic.   HENT:      Head: Normocephalic and atraumatic.   Eyes:      Extraocular Movements: Extraocular movements intact.   Cardiovascular:      Rate and Rhythm: Normal rate and regular rhythm.      Heart sounds: Normal heart sounds. No murmur heard.  Pulmonary:      Effort: Pulmonary effort is normal. No respiratory distress.      Breath sounds: Normal breath sounds. No wheezing.   Abdominal:      General: Bowel sounds are normal. There is no distension.      Palpations: Abdomen is soft. There is no mass.      Tenderness: There is no abdominal tenderness.   Musculoskeletal:         General: Swelling present.      Cervical back: Normal range of motion and neck supple.      Right lower leg: Edema present.      Left lower leg: Edema present.   Skin:     General: Skin is warm and dry.      Capillary Refill: Capillary refill takes 2 to 3 seconds.   Neurological:      General: No focal deficit present.      Mental Status: He is alert and oriented to person, place, and time.      Cranial Nerves: No cranial nerve deficit.      Motor: Weakness present.   Psychiatric:         Mood and Affect: Mood normal.         Behavior: Behavior normal.         Thought Content: Thought content normal.         Judgment: Judgment normal.             Significant Labs: All pertinent labs within the past 24 hours have been reviewed.  CBC:   Recent Labs   Lab 01/19/25  0611 01/20/25  0458   WBC 3.21* 2.94*  2.94*   HGB 8.6* 8.3*  8.3*   HCT 29.8* 29.1*  29.1*    261  261     CMP:   Recent Labs   Lab 01/19/25  0611 01/20/25  0458    139  139   K 4.1 4.4  4.4    108  108   CO2 18* 17*  17*   * 198*  198*   BUN 43* 50*  50*   CREATININE 2.3* 2.5*  2.5*   CALCIUM 9.7 9.7  9.7   ALBUMIN  --  1.2*   ANIONGAP 14 14  14       Significant Imaging:   CT HEAD WITHOUT CONTRAST     CLINICAL HISTORY:  generalized weakness;      TECHNIQUE:  Low dose axial CT images obtained throughout the head without intravenous contrast. Sagittal and coronal reconstructions were performed.  All CT scans at this facility use dose modulation, iterative reconstruction and/or weight based dosing when appropriate to reduce radiation dose to as low as reasonably achievable.     COMPARISON:  12/07/2022     FINDINGS:  Intracranial compartment:     The brain parenchyma demonstrates areas of decreased attenuation with mild to moderate periventricular white matter consistent with chronic microvascular ischemic changes..  No parenchymal mass, hemorrhage, edema or major vascular distribution infarct.  Vascular calcifications are noted.     Mild prominence of the sulci and ventricles are consistent with age-related involutional changes.     No extra-axial blood or fluid collections.     Skull/extracranial contents (limited evaluation): No fracture. Scattered mucosal thickening greatest within the left sphenoid sinus.  No fluid levels.  Right mastoid effusion similar to prior.  Trace left mastoid effusion.  Paranasal sinuses are essentially clear.     Impression:     Chronic microvascular ischemic changes.        Electronically signed by:Andrew Crespo MD  Date:                                            01/20/2025  Time:                                           11:56    MRI SPINE CERVICAL-THORACIC-LUMBAR WITHOUT CONTRAST (XPD)     CLINICAL HISTORY:  Demyelinating disease;Myelopathy, acute;     TECHNIQUE:  Multiplanar, multisequence MR images of the cervical, thoracic, and lumbar spine were performed without the administration of contrast.     COMPARISON:  Prior CTs     FINDINGS:  Vertebral body heights and lateral alignment maintained.     No aggressive marrow signal identified     No atypical cord signal identified.     Multilevel spondylosis mild to moderate with no marked canal or foraminal compromise identified     Paraspinal muscular atrophy     Impression:      No acute finding or compromise identified unenhanced imaging        Electronically signed by:Che Hopson  Date:                                            01/19/2025  Time:                                           15:56    Assessment and Plan     * Severe physical deconditioning  01/17/2025  Patient requiring max assists with bed transitions and feeding. High intensity therapy recommended by therapists. Patient previously independent, living at home. Referrals for rehab placed at this time.      01/18/2025  Some notable objective clinical improvement. Reports decreased ROM in UE bilaterally but predominantly his right UE. Radiography of the left hand and R shoulder unremarkable. Will obtain CT cervical spine to further assess for stenosis.     01/19/2025  Cervical spine CT shows right moderate foraminal stenosis at C3-4 and C5-6 secondary to spurring of the uncovertebral joints.  Will obtain MRI of spine and consult orthospine to assess to see if findings could be contributing to his significant UE weakness and pain.     1/20/2025  Patient's daughter tells me patient independently prior to hospitalization. Patient complains of upper and lower bilateral weakness. While Prograf toxicity could play a part in progressive decline, he will need additional work up. CT head unremarkable. Kidney function has returned to baseline.  Vitamin B12 pending. Neurosurgery/Neurology consult pending. He is awaiting rehab vs SNF placement.         Tacrolimus-induced GI toxicity  Prograf level very high  Prograf on hold    Await repeat Prograf level    Repeat levels normal- will restart Prograf from am at 3 mg bid      Gross hematuria  Stable, has Purewick catheter now  Getting IVF, if gets worse, may need CBI    Still persists but a little better  If no improvement tomorrow- will try CBI    Improving with good Urine output, CBI not needed  Cont close monitoring      Improving, cont IVF    1/20/25  Improved  IVFs  discontinued    CKD (chronic kidney disease) stage 4, GFR 15-29 ml/min  Creatine stable for now. BMP reviewed- noted Estimated Creatinine Clearance: 35.2 mL/min (A) (based on SCr of 2.5 mg/dL (H)). according to latest data. Based on current GFR, CKD stage is stage 4 - GFR 15-29.  Monitor UOP and serial BMP and adjust therapy as needed. Renally dose meds. Avoid nephrotoxic medications and procedures.  Pt is d/p Renal Transplant in 2015, on Prograf and Cellcept    Edwina on CKD 4- sec to Prograf toxicity- Bleeding- started on IVF  Cont IVF  Improving with IVF, Cr coming down to 3    01/20/2025  Stable  Immunosuppressant therapy restarted    ABL Anemia plus anemia of CKD 4      Anemia is likely due to acute blood loss which was from CKD, s/p Renal transplant and chronic disease due to Chronic Kidney Disease. Most recent hemoglobin and hematocrit are listed below.  Recent Labs     01/17/25  1207 01/18/25  0526 01/19/25  0611   HGB 8.7* 8.4* 8.6*   HCT 29.1* 28.7* 29.8*       01/19/2025  stable      VTE Risk Mitigation (From admission, onward)           Ordered     Reason for No Pharmacological VTE Prophylaxis  Once        Question:  Reasons:  Answer:  Active Bleeding    01/12/25 1729     IP VTE HIGH RISK PATIENT  Once         01/12/25 1729     Place sequential compression device  Until discontinued         01/12/25 1729                    Discharge Planning   GRETEL:      Code Status: Full Code   Medical Readiness for Discharge Date:   Discharge Plan A: Other (Pending Progression)              Darya Maravilla NP  Department of Hospital Medicine   [facility] ([facility])

## 2025-01-20 NOTE — PT/OT/SLP PROGRESS
Physical Therapy  Treatment    Mitch Whittaker   MRN: [id number]   Admitting Diagnosis: Physical deconditioning       PT Start Time: 1137     PT Stop Time: 1205    PT Total Time (min): 28 min       Billable Minutes:  Therapeutic Exercise 28    Treatment Type: Treatment  PT/PTA: PT     Number of PTA visits since last PT visit: 0       General Precautions: Standard, fall  Orthopedic Precautions: N/A  Braces: N/A  Respiratory Status: Room air    Spiritual, Cultural Beliefs, Orthodoxy Practices, Values that Affect Care: no    Subjective:  Communicated with nursing (Anita) and performed chart review via epic system prior to session.  Pt agreeable to PT services with encouragement. Daughter at bedside asserting that pt was independent prior to 1/6/25    Pain/Comfort  Pain Rating 1: 7/10 (pain with palpation and mobility of BLE)  Pain Addressed 1: Reposition, Distraction, Nurse notified  Pain Rating Post-Intervention 1: 7/10    Objective:   Patient found with: peripheral IV, telemetry. Pt supine immediately following CT    Functional Mobility/Treatment and Education:  Educated pt on benefits of consistent participation in PT services to meet functional goals. Educated pt on supine therex to promote strength, circulation and joint mobility. Exercises included AP,  SAQ, heel slides, hip IR/ER x 10 reps with AAROM/PROM. Required increased cuing and demonstration for proper technique and muscle engagement. Educated to perform exercises intermittently throughout day to tolerance. Educated pt on call don't fall policy and use of call button to alert nursing staff of needs (including to assist in/out of bed). Pt expressed understanding.      AM-PAC 6 CLICK MOBILITY  How much help from another person does this patient currently need?   1 = Unable, Total/Dependent Assistance  2 = A lot, Maximum/Moderate Assistance  3 = A little, Minimum/Contact Guard/Supervision  4 = None, Modified [place]/Independent    Turning over in bed  (including adjusting bedclothes, sheets and blankets)?: 1  Sitting down on and standing up from a chair with arms (e.g., wheelchair, bedside commode, etc.): 1  Moving from lying on back to sitting on the side of the bed?: 1  Moving to and from a bed to a chair (including a wheelchair)?: 1  Need to walk in hospital room?: 1  Climbing 3-5 steps with a railing?: 1  Basic Mobility Total Score: 6    AM-PAC Raw Score CMS G-Code Modifier Level of Impairment Assistance   6 % Total / Unable   7 - 9 CM 80 - 100% Maximal Assist   10 - 14 CL 60 - 80% Moderate Assist   15 - 19 CK 40 - 60% Moderate Assist   20 - 22 CJ 20 - 40% Minimal Assist   23 CI 1-20% SBA / CGA   24 CH 0% Independent/ Mod I     Patient left supine with all lines intact, call button in reach, nursing notified, and daughter present.    Assessment:  Mitch Whittaker is a 69 y.o. male with a medical diagnosis of Physical deconditioning and presents with deficits in overall mobility. Pt was unable to tolerate EOB activity due to fatigue. Decreased active ROM and limited ability to sustain muscle contractions throughout movement. Pt will benefit from continued PT services in order to progress toward baseline. Recommend mod intensity.    Rehab identified problem list/impairments: impaired endurance, weakness, impaired functional mobility, gait instability, impaired balance, decreased coordination, decreased lower extremity function, pain, decreased safety awareness    Rehab potential is fair.    Activity tolerance: Fair    Discharge recommendations: Moderate Intensity Therapy      Barriers to discharge:      Equipment recommendations: to be determined by next level of care     GOALS:   Multidisciplinary Problems       Physical Therapy Goals          Problem: Physical Therapy    Goal Priority Disciplines Outcome Interventions   Physical Therapy Goal     PT, PT/OT Progressing    Description: All LTGs to be met by 2/1/25    Bed mobility Sba   Transfers SBA    Gait of at least 100 feet SBA   Pt will increase AMPAC score by 2 points to progress functional mobility  Pt will tolerate > 10 min of functional activity to progress gross functional mobility                        DME Justifications:  TBD at next level of care    PLAN:    Patient to be seen 3 x/week to address the above listed problems via gait training, therapeutic activities, therapeutic exercises, neuromuscular re-education  Plan of Care expires: 02/01/25  Plan of Care reviewed with: patient, daughter         01/20/2025

## 2025-01-20 NOTE — PT/OT/SLP PROGRESS
"Speech Language Pathology Treatment    Patient Name:  Mitch Whittaker   MRN:  [id number]  Admitting Diagnosis: Physical deconditioning    Recommendations:                 General Recommendations:   dysphagia management  Diet recommendations:  Minced & Moist Diet - IDDSI Level 5, Liquid Diet Level: Thin liquids - IDDSI Level 0   Aspiration Precautions: Assistance with meals, Feed only when awake/alert, Frequent oral care, HOB to 90 degrees, Monitor for s/s of aspiration, Small bites/sips, and Standard aspiration precautions   General Precautions: Standard, aspiration  Communication strategies:  provide increased time to answer; cognitive deficits    Assessment:     Mitch Whittaker is a 69 y.o. male admitted to [facility] acute with severe physical deconditioning, Tacrolimus-induced GI toxicity, gross hematuria and ANGELITO on CKD-4.  He presents with adequate REBEKA, although fatigue and cognitive deficits noted.  CT head revealed "mild to moderate periventricular white matter consistent with chronic microvascular ischemic changes."  He exhibited poor bed mobility, requiring ST and daughter assist in positioning him upright for consumption of lunch meal.  He also exhibited difficulty self-feeding.  He is recommended to continue IDDSI 5-minced/moist solids and IDDSI 0-thin liquids, following aspiration precautions and oral care/hygiene.  ST will f/u for advancement if clinically indicated, as pt reportedly consuming a regular diet prior to admission.    Subjective     Pt seen bedside for ST. No c/o pain.  Pt's daughter present.  Pt's daughter reported he is independent with personal ADL's. He no longer works--has been retired and does not drive often per report.  Patient goals: to return to PLOF     Pain/Comfort:  Pain Rating 1: 0/10  Pain Rating Post-Intervention 1: 0/10  Pain Rating 2: 0/10  Pain Rating Post-Intervention 2: 0/10      CT HEAD WITHOUT CONTRAST     CLINICAL HISTORY:  generalized weakness;     TECHNIQUE:  Low dose " axial CT images obtained throughout the head without intravenous contrast. Sagittal and coronal reconstructions were performed.  All CT scans at this facility use dose modulation, iterative reconstruction and/or weight based dosing when appropriate to reduce radiation dose to as low as reasonably achievable.     COMPARISON:  12/07/2022     FINDINGS:  Intracranial compartment:     The brain parenchyma demonstrates areas of decreased attenuation with mild to moderate periventricular white matter consistent with chronic microvascular ischemic changes..  No parenchymal mass, hemorrhage, edema or major vascular distribution infarct.  Vascular calcifications are noted.     Mild prominence of the sulci and ventricles are consistent with age-related involutional changes.     No extra-axial blood or fluid collections.     Skull/extracranial contents (limited evaluation): No fracture. Scattered mucosal thickening greatest within the left sphenoid sinus.  No fluid levels.  Right mastoid effusion similar to prior.  Trace left mastoid effusion.  Paranasal sinuses are essentially clear.     Impression:     Chronic microvascular ischemic changes.        Electronically signed by:Andrew Crespo MD  Date:                                            01/20/2025  Time:                                           11:56    Objective:     Has the patient been evaluated by SLP for swallowing?   Yes  Keep patient NPO? No     Pt with improved intake.  He was easily fatigued with attempted self-feeding, requiring ST assist during most of meal.  Manipulation/mastication of solids is prolonged, and he is most efficient with continued IDDSI 5-minced/moist solids and IDDSI 0-thin liquids at this time.  Daughter educated on diet, precautions and oral care/hygiene.    Goals:   Multidisciplinary Problems       SLP Goals          Problem: SLP    Goal Priority Disciplines Outcome   SLP Goal     SLP    Description: 1.  Pt will consume the least restrictive  PO diet without overt s/s of aspiration.                       Plan:     Patient to be seen:  2 x/week, 3 x/week   Plan of Care expires:  01/27/25  Plan of Care reviewed with:  patient, daughter   SLP Follow-Up:  Yes       Discharge recommendations:  Moderate Intensity Therapy   Barriers to Discharge:  None    Time Tracking:     SLP Treatment Date:   01/20/25  Speech Start Time:  1200  Speech Stop Time:  1230     Speech Total Time (min):  30 min    Billable Minutes: Treatment Swallowing Dysfunction 15 minutes and Self Care/Home Management Training 15 minutes    01/20/2025

## 2025-01-20 NOTE — SUBJECTIVE & OBJECTIVE
Interval History: See hospital course.     Review of Systems   Constitutional:  Positive for activity change and appetite change. Negative for fever.   HENT:  Negative for hearing loss.    Eyes:  Negative for visual disturbance.   Respiratory:  Negative for cough and shortness of breath.    Cardiovascular:  Positive for leg swelling. Negative for chest pain (mild nagging pain in midline) and palpitations.   Genitourinary:  Negative for difficulty urinating, dysuria, frequency and hematuria.   Musculoskeletal:  Negative for arthralgias and back pain.        <ROM to lower extremities   Skin:  Positive for wound. Negative for color change and pallor.   Neurological:  Positive for weakness. Negative for seizures, syncope, numbness and headaches.   Hematological:  Does not bruise/bleed easily.   Psychiatric/Behavioral:  The patient is not nervous/anxious.      Objective:     Vital Signs (Most Recent):  Temp: 96.1 °F (35.6 °C) (Cr. Jacob notified) (01/20/25 0859)  Pulse: 86 (01/20/25 1148)  Resp: 20 (01/20/25 1148)  BP: (!) 114/58 (01/20/25 1148)  SpO2: 95 % (01/20/25 1148) Vital Signs (24h Range):  Temp:  [96.1 °F (35.6 °C)-99 °F (37.2 °C)] 96.1 °F (35.6 °C)  Pulse:  [] 86  Resp:  [18-22] 20  SpO2:  [92 %-99 %] 95 %  BP: (114-147)/(57-71) 114/58     Weight: 130.2 kg (287 lb 0.6 oz)  Body mass index is 44.96 kg/m².    Intake/Output Summary (Last 24 hours) at 1/20/2025 1443  Last data filed at 1/20/2025 0852  Gross per 24 hour   Intake 480 ml   Output 650 ml   Net -170 ml         Physical Exam  Constitutional:       General: He is not in acute distress.     Appearance: Normal appearance. He is well-developed. He is obese. He is ill-appearing. He is not toxic-appearing or diaphoretic.   HENT:      Head: Normocephalic and atraumatic.   Eyes:      Extraocular Movements: Extraocular movements intact.   Cardiovascular:      Rate and Rhythm: Normal rate and regular rhythm.      Heart sounds: Normal heart sounds. No murmur  heard.  Pulmonary:      Effort: Pulmonary effort is normal. No respiratory distress.      Breath sounds: Normal breath sounds. No wheezing.   Abdominal:      General: Bowel sounds are normal. There is no distension.      Palpations: Abdomen is soft. There is no mass.      Tenderness: There is no abdominal tenderness.   Musculoskeletal:         General: Swelling present.      Cervical back: Normal range of motion and neck supple.      Right lower leg: Edema present.      Left lower leg: Edema present.   Skin:     General: Skin is warm and dry.      Capillary Refill: Capillary refill takes 2 to 3 seconds.   Neurological:      General: No focal deficit present.      Mental Status: He is alert and oriented to person, place, and time.      Cranial Nerves: No cranial nerve deficit.      Motor: Weakness present.   Psychiatric:         Mood and Affect: Mood normal.         Behavior: Behavior normal.         Thought Content: Thought content normal.         Judgment: Judgment normal.             Significant Labs: All pertinent labs within the past 24 hours have been reviewed.  CBC:   Recent Labs   Lab 01/19/25  0611 01/20/25  0458   WBC 3.21* 2.94*  2.94*   HGB 8.6* 8.3*  8.3*   HCT 29.8* 29.1*  29.1*    261  261     CMP:   Recent Labs   Lab 01/19/25  0611 01/20/25  0458    139  139   K 4.1 4.4  4.4    108  108   CO2 18* 17*  17*   * 198*  198*   BUN 43* 50*  50*   CREATININE 2.3* 2.5*  2.5*   CALCIUM 9.7 9.7  9.7   ALBUMIN  --  1.2*   ANIONGAP 14 14  14       Significant Imaging:   CT HEAD WITHOUT CONTRAST     CLINICAL HISTORY:  generalized weakness;     TECHNIQUE:  Low dose axial CT images obtained throughout the head without intravenous contrast. Sagittal and coronal reconstructions were performed.  All CT scans at this facility use dose modulation, iterative reconstruction and/or weight based dosing when appropriate to reduce radiation dose to as low as reasonably achievable.      COMPARISON:  12/07/2022     FINDINGS:  Intracranial compartment:     The brain parenchyma demonstrates areas of decreased attenuation with mild to moderate periventricular white matter consistent with chronic microvascular ischemic changes..  No parenchymal mass, hemorrhage, edema or major vascular distribution infarct.  Vascular calcifications are noted.     Mild prominence of the sulci and ventricles are consistent with age-related involutional changes.     No extra-axial blood or fluid collections.     Skull/extracranial contents (limited evaluation): No fracture. Scattered mucosal thickening greatest within the left sphenoid sinus.  No fluid levels.  Right mastoid effusion similar to prior.  Trace left mastoid effusion.  Paranasal sinuses are essentially clear.     Impression:     Chronic microvascular ischemic changes.        Electronically signed by:Andrew Crespo MD  Date:                                            01/20/2025  Time:                                           11:56    MRI SPINE CERVICAL-THORACIC-LUMBAR WITHOUT CONTRAST (XPD)     CLINICAL HISTORY:  Demyelinating disease;Myelopathy, acute;     TECHNIQUE:  Multiplanar, multisequence MR images of the cervical, thoracic, and lumbar spine were performed without the administration of contrast.     COMPARISON:  Prior CTs     FINDINGS:  Vertebral body heights and lateral alignment maintained.     No aggressive marrow signal identified     No atypical cord signal identified.     Multilevel spondylosis mild to moderate with no marked canal or foraminal compromise identified     Paraspinal muscular atrophy     Impression:     No acute finding or compromise identified unenhanced imaging        Electronically signed by:Che Hopson  Date:                                            01/19/2025  Time:                                           15:56

## 2025-01-20 NOTE — ASSESSMENT & PLAN NOTE
Stable, has Purewick catheter now  Getting IVF, if gets worse, may need CBI    Still persists but a little better  If no improvement tomorrow- will try CBI    Improving with good Urine output, CBI not needed  Cont close monitoring      Improving, cont IVF    1/20/25  Improved  IVFs discontinued

## 2025-01-21 LAB
ANION GAP SERPL CALC-SCNC: 13 MMOL/L (ref 8–16)
BACTERIA #/AREA URNS HPF: ABNORMAL /HPF
BILIRUB UR QL STRIP: ABNORMAL
BUN SERPL-MCNC: 52 MG/DL (ref 8–23)
CALCIUM SERPL-MCNC: 9.6 MG/DL (ref 8.7–10.5)
CHLORIDE SERPL-SCNC: 105 MMOL/L (ref 95–110)
CLARITY UR: CLEAR
CO2 SERPL-SCNC: 19 MMOL/L (ref 23–29)
COLOR UR: YELLOW
CREAT SERPL-MCNC: 2.4 MG/DL (ref 0.5–1.4)
EST. GFR  (NO RACE VARIABLE): 28 ML/MIN/1.73 M^2
GLUCOSE SERPL-MCNC: 217 MG/DL (ref 70–110)
GLUCOSE UR QL STRIP: NEGATIVE
GRAN CASTS #/AREA URNS LPF: 31 /LPF
HGB UR QL STRIP: ABNORMAL
HYALINE CASTS #/AREA URNS LPF: 5 /LPF
KETONES UR QL STRIP: NEGATIVE
LEUKOCYTE ESTERASE UR QL STRIP: NEGATIVE
MICROSCOPIC COMMENT: ABNORMAL
NITRITE UR QL STRIP: NEGATIVE
PH UR STRIP: 6 [PH] (ref 5–8)
POCT GLUCOSE: 199 MG/DL (ref 70–110)
POCT GLUCOSE: 208 MG/DL (ref 70–110)
POCT GLUCOSE: 237 MG/DL (ref 70–110)
POCT GLUCOSE: 247 MG/DL (ref 70–110)
POTASSIUM SERPL-SCNC: 4.4 MMOL/L (ref 3.5–5.1)
PROCALCITONIN SERPL IA-MCNC: 1.27 NG/ML
PROT UR QL STRIP: ABNORMAL
RBC #/AREA URNS HPF: >100 /HPF (ref 0–4)
SODIUM SERPL-SCNC: 137 MMOL/L (ref 136–145)
SP GR UR STRIP: 1.02 (ref 1–1.03)
SQUAMOUS #/AREA URNS HPF: 2 /HPF
URN SPEC COLLECT METH UR: ABNORMAL
UROBILINOGEN UR STRIP-ACNC: 1 EU/DL
WBC #/AREA URNS HPF: 8 /HPF (ref 0–5)
YEAST URNS QL MICRO: ABNORMAL

## 2025-01-21 PROCEDURE — 36415 COLL VENOUS BLD VENIPUNCTURE: CPT | Performed by: NURSE PRACTITIONER

## 2025-01-21 PROCEDURE — 21400001 HC TELEMETRY ROOM

## 2025-01-21 PROCEDURE — 25000003 PHARM REV CODE 250: Performed by: STUDENT IN AN ORGANIZED HEALTH CARE EDUCATION/TRAINING PROGRAM

## 2025-01-21 PROCEDURE — 87086 URINE CULTURE/COLONY COUNT: CPT | Performed by: NURSE PRACTITIONER

## 2025-01-21 PROCEDURE — 80048 BASIC METABOLIC PNL TOTAL CA: CPT | Performed by: STUDENT IN AN ORGANIZED HEALTH CARE EDUCATION/TRAINING PROGRAM

## 2025-01-21 PROCEDURE — 25000003 PHARM REV CODE 250: Performed by: HOSPITALIST

## 2025-01-21 PROCEDURE — 25000003 PHARM REV CODE 250: Performed by: EMERGENCY MEDICINE

## 2025-01-21 PROCEDURE — G0426 INPT/ED TELECONSULT50: HCPCS | Mod: GT,,, | Performed by: PSYCHIATRY & NEUROLOGY

## 2025-01-21 PROCEDURE — 63600175 PHARM REV CODE 636 W HCPCS: Performed by: EMERGENCY MEDICINE

## 2025-01-21 PROCEDURE — 84145 PROCALCITONIN (PCT): CPT | Performed by: NURSE PRACTITIONER

## 2025-01-21 PROCEDURE — 81000 URINALYSIS NONAUTO W/SCOPE: CPT | Performed by: NURSE PRACTITIONER

## 2025-01-21 PROCEDURE — 63600175 PHARM REV CODE 636 W HCPCS: Performed by: STUDENT IN AN ORGANIZED HEALTH CARE EDUCATION/TRAINING PROGRAM

## 2025-01-21 PROCEDURE — 99232 SBSQ HOSP IP/OBS MODERATE 35: CPT | Mod: ,,, | Performed by: INTERNAL MEDICINE

## 2025-01-21 PROCEDURE — 63600175 PHARM REV CODE 636 W HCPCS: Performed by: NURSE PRACTITIONER

## 2025-01-21 PROCEDURE — A4216 STERILE WATER/SALINE, 10 ML: HCPCS | Performed by: EMERGENCY MEDICINE

## 2025-01-21 PROCEDURE — 25000003 PHARM REV CODE 250: Performed by: PHYSICIAN ASSISTANT

## 2025-01-21 RX ORDER — CEFTRIAXONE 1 G/1
1 INJECTION, POWDER, FOR SOLUTION INTRAMUSCULAR; INTRAVENOUS
Status: DISCONTINUED | OUTPATIENT
Start: 2025-01-21 | End: 2025-01-25

## 2025-01-21 RX ADMIN — INSULIN ASPART 4 UNITS: 100 INJECTION, SOLUTION INTRAVENOUS; SUBCUTANEOUS at 05:01

## 2025-01-21 RX ADMIN — NYSTATIN 500000 UNITS: 100000 SUSPENSION ORAL at 08:01

## 2025-01-21 RX ADMIN — PANTOPRAZOLE SODIUM 40 MG: 40 TABLET, DELAYED RELEASE ORAL at 08:01

## 2025-01-21 RX ADMIN — Medication 10 ML: at 05:01

## 2025-01-21 RX ADMIN — METOPROLOL SUCCINATE 25 MG: 25 TABLET, FILM COATED, EXTENDED RELEASE ORAL at 08:01

## 2025-01-21 RX ADMIN — INSULIN ASPART 1 UNITS: 100 INJECTION, SOLUTION INTRAVENOUS; SUBCUTANEOUS at 07:01

## 2025-01-21 RX ADMIN — Medication 10 ML: at 09:01

## 2025-01-21 RX ADMIN — Medication 10 ML: at 02:01

## 2025-01-21 RX ADMIN — CEFTRIAXONE 1 G: 1 INJECTION, POWDER, FOR SOLUTION INTRAMUSCULAR; INTRAVENOUS at 02:01

## 2025-01-21 RX ADMIN — NYSTATIN 500000 UNITS: 100000 SUSPENSION ORAL at 09:01

## 2025-01-21 RX ADMIN — GABAPENTIN 300 MG: 300 CAPSULE ORAL at 02:01

## 2025-01-21 RX ADMIN — TACROLIMUS 3 MG: 1 CAPSULE ORAL at 08:01

## 2025-01-21 RX ADMIN — INSULIN ASPART 4 UNITS: 100 INJECTION, SOLUTION INTRAVENOUS; SUBCUTANEOUS at 11:01

## 2025-01-21 RX ADMIN — INSULIN GLARGINE 10 UNITS: 100 INJECTION, SOLUTION SUBCUTANEOUS at 09:01

## 2025-01-21 RX ADMIN — LIDOCAINE 5% 2 PATCH: 700 PATCH TOPICAL at 09:01

## 2025-01-21 RX ADMIN — GABAPENTIN 300 MG: 300 CAPSULE ORAL at 08:01

## 2025-01-21 RX ADMIN — PREDNISONE 5 MG: 5 TABLET ORAL at 08:01

## 2025-01-21 RX ADMIN — NYSTATIN 500000 UNITS: 100000 SUSPENSION ORAL at 04:01

## 2025-01-21 RX ADMIN — MYCOPHENOLATE MOFETIL 500 MG: 500 TABLET, FILM COATED ORAL at 08:01

## 2025-01-21 RX ADMIN — INSULIN GLARGINE 10 UNITS: 100 INJECTION, SOLUTION SUBCUTANEOUS at 08:01

## 2025-01-21 RX ADMIN — PANTOPRAZOLE SODIUM 40 MG: 40 TABLET, DELAYED RELEASE ORAL at 09:01

## 2025-01-21 RX ADMIN — MYCOPHENOLATE MOFETIL 500 MG: 500 TABLET, FILM COATED ORAL at 09:01

## 2025-01-21 RX ADMIN — GABAPENTIN 300 MG: 300 CAPSULE ORAL at 09:01

## 2025-01-21 RX ADMIN — TACROLIMUS 3 MG: 1 CAPSULE ORAL at 06:01

## 2025-01-21 RX ADMIN — INSULIN ASPART 4 UNITS: 100 INJECTION, SOLUTION INTRAVENOUS; SUBCUTANEOUS at 04:01

## 2025-01-21 NOTE — ASSESSMENT & PLAN NOTE
Stable, has Purewick catheter now  Getting IVF, if gets worse, may need CBI    Still persists but a little better  If no improvement tomorrow- will try CBI    Improving with good Urine output, CBI not needed  Cont close monitoring      Improving, cont IVF    1/20/25  Improved  IVFs discontinued    1/21/25  Microscopic urine >100  Add high risk urine culture  Empirically place on Rocephin  Consider Urology consultation

## 2025-01-21 NOTE — PROGRESS NOTES
Nephrology Progress Note     History of Present Illness     Mr. Whitatker is a 69 year  male with a hx of previous ESRD likely related to diabetes and hypertension that was on dialysis several years prior to receiving a living related kidney transplant from his daughter in 2015.  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  He is followed by Dr. Gonsalez of [facility] seen last on 09/24/2024 at which time his serum creatinine was 2.2.     He was seen in the emergency department on 01/06/2025 complaining of increasing lower extremity edema.  At that time his serum creatinine was 2.8.  He returns to the emergency department 01/12/2025 with persistent lower extremity edema and associated blistering.  He also complains of bilateral knee pain to the point where he is unable to ambulate.  He attributes this to the change in weather.  His admission laboratory reveals a serum creatinine now up to 4.3 with a potassium of 5.4.  Nephrology has been asked to see and evaluate the patient.     As an outpatient he is chronically on Lasix 40 mg twice a day.  He denies the use of nonsteroidal anti-inflammatory drugs.  He denies dysuria hematuria or difficulty voiding.  He is chronically on Entresto.      Interval History   Overnight/currently: No events and no new complaints today.  Patient denies any chest pain or shortness of breath.  Renal function is stable.        Allergies:    is allergic to lisinopril, actos  [pioglitazone], and metformin.    Current medications:   Scheduled Meds:   gabapentin  300 mg Oral TID    insulin glargine U-100  10 Units Subcutaneous BID    LIDOcaine  2 patch Transdermal Q24H    metoprolol succinate  25 mg Oral Daily    mycophenolate  500 mg Oral BID     "nystatin  500,000 Units Oral QID    pantoprazole  40 mg Oral BID    predniSONE  5 mg Oral Daily    sodium chloride 0.9%  10 mL Intravenous Q8H    tacrolimus  3 mg Oral BID     Continuous Infusions:  PRN Meds:.  Current Facility-Administered Medications:     0.9%  NaCl infusion (for blood administration), , Intravenous, Q24H PRN    acetaminophen, 650 mg, Oral, Q4H PRN    dextrose 50%, 12.5 g, Intravenous, PRN    dextrose 50%, 25 g, Intravenous, PRN    glucagon (human recombinant), 1 mg, Intramuscular, PRN    glucose, 16 g, Oral, PRN    glucose, 24 g, Oral, PRN    HYDROcodone-acetaminophen, 1 tablet, Oral, Q6H PRN    insulin aspart U-100, 0-10 Units, Subcutaneous, QID (AC + HS) PRN    melatonin, 6 mg, Oral, Nightly PRN    metoprolol, 5 mg, Intravenous, Q6H PRN    naloxone, 0.02 mg, Intravenous, PRN    ondansetron, 8 mg, Oral, Q8H PRN    ondansetron, 4 mg, Intravenous, Q8H PRN    polyethylene glycol, 17 g, Oral, Daily PRN    senna-docusate 8.6-50 mg, 2 tablet, Oral, Daily PRN     Physical Examination     VS/Measurements    BP (!) 95/53 (BP Location: Left arm, Patient Position: Lying)   Pulse 100   Temp 97.4 °F (36.3 °C) (Oral)   Resp 20   Ht 5' 7" (1.702 m)   Wt 130.2 kg (287 lb 0.6 oz)   SpO2 95%   BMI 44.96 kg/m²         General:  Moderately built, not in any distress.  Neck:  Supple,   Respiratory: Non-labored,  Lungs are clear to auscultation.    Cardiovascular:  Normal rate, Regular rhythm.   Abdomen:  Soft, Non-tender, Normal bowel sounds.   Muskuloskeletal:  + edema          Laboratory Results   Today's Lab Results :    Recent Results (from the past 24 hours)   POCT glucose    Collection Time: 01/20/25  1:59 PM   Result Value Ref Range    POCT Glucose 269 (H) 70 - 110 mg/dL   POCT glucose    Collection Time: 01/20/25  4:40 PM   Result Value Ref Range    POCT Glucose 257 (H) 70 - 110 mg/dL   Vitamin B12    Collection Time: 01/20/25  5:10 PM   Result Value Ref Range    Vitamin B-12 >2000 (H) 210 - 950 pg/mL "   EKG 12-lead    Collection Time: 01/20/25  6:00 PM   Result Value Ref Range    QRS Duration 146 ms    [facility] QTC Calculation 495 ms   POCT glucose    Collection Time: 01/20/25  7:43 PM   Result Value Ref Range    POCT Glucose 285 (H) 70 - 110 mg/dL   Urinalysis, Reflex to Urine Culture Urine, Clean Catch    Collection Time: 01/21/25  3:04 AM    Specimen: Urine   Result Value Ref Range    Specimen UA Urine, Clean Catch     Color, UA Yellow Yellow, Straw, Nikki    Appearance, UA Clear Clear    pH, UA 6.0 5.0 - 8.0    Specific Gravity, UA 1.025 1.005 - 1.030    Protein, UA Trace (A) Negative    Glucose, UA Negative Negative    Ketones, UA Negative Negative    Bilirubin (UA) 1+ (A) Negative    Occult Blood UA 2+ (A) Negative    Nitrite, UA Negative Negative    Urobilinogen, UA 1.0 <2.0 EU/dL    Leukocytes, UA Negative Negative   Urinalysis Microscopic    Collection Time: 01/21/25  3:04 AM   Result Value Ref Range    RBC, UA >100 (H) 0 - 4 /hpf    WBC, UA 8 (H) 0 - 5 /hpf    Bacteria Occasional None-Occ /hpf    Yeast, UA Few (A) None    Squam Epithel, UA 2 /hpf    Hyaline Casts, UA 5 (A) 0-1/lpf /lpf    Granular Casts, UA 31 (A) None /lpf    Microscopic Comment SEE COMMENT    POCT glucose    Collection Time: 01/21/25  5:35 AM   Result Value Ref Range    POCT Glucose 247 (H) 70 - 110 mg/dL   Basic Metabolic Panel    Collection Time: 01/21/25  5:47 AM   Result Value Ref Range    Sodium 137 136 - 145 mmol/L    Potassium 4.4 3.5 - 5.1 mmol/L    Chloride 105 95 - 110 mmol/L    CO2 19 (L) 23 - 29 mmol/L    Glucose 217 (H) 70 - 110 mg/dL    BUN 52 (H) 8 - 23 mg/dL    Creatinine 2.4 (H) 0.5 - 1.4 mg/dL    Calcium 9.6 8.7 - 10.5 mg/dL    Anion Gap 13 8 - 16 mmol/L    eGFR 28 (A) >60 mL/min/1.73 m^2   Procalcitonin    Collection Time: 01/21/25  5:47 AM   Result Value Ref Range    Procalcitonin 1.27 (H) <0.25 ng/mL       LABS:  Reviewed Yes      Intake/Output Summary (Last 24 hours) at 1/21/2025 0818  Last data filed at 1/20/2025  1900  Gross per 24 hour   Intake --   Output 600 ml   Net -600 ml       Assessment and Plan     ANGELITO/CKD stage 4 in his transplant kidney.  [place] to be secondary to possible intravascular volume depletion complicated by Prograf toxicity.  Creatinine back within baseline.  Stable this morning.     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  Chronic allograft nephropathy.  Followed by Dr. Gonsalez.  As above.  Continue immunosuppression with CellCept, Prograf and prednisone.  Prograf trough level 1/20 likely drawn a bit early and not accurate.  Given his time out from transplant and the fact that he has a living related donor kidney transplant, a Prograf trough level of 4-6 would be adequate.  Repeat in a couple of days.     Gross hematuria.  Likely Ann trauma.  Anticoagulants on hold.  This appears to be clear at this time.     Hyperkalemia.  This has resolved with Lokelma as well as holding his Entresto and potassium supplements.     Hypercalcemia.  Corrected calcium 11.94.  Serum and urine protein electrophoresis pending.  He has been on calcitriol outpatient which is being held here.  Avoid calcium and vitamin-D.     Melena.  Resolved.  GI following.       Anemia.  Hematuria versus possible GI bleed while on Eliquis and aspirin.  Status post 1 unit of PRBCs and hemoglobin relatively stable at this point.     Hypertension CKD.  Blood pressure at goal.       HFrEF.  Currently compensated.  EF 35% 11/2024.  Followed by Dr. Man with Cardiology.     Diabetes mellitus type 2.  Defer to Hospital Medicine.        ________________________________________________  Elias Watkins

## 2025-01-21 NOTE — PLAN OF CARE
Discussed poc with pt, pt verbalized understanding    Purposeful rounding every 2hours    VS wnl  Cardiac monitoring in use, pt is NSR, tele monitor # [id number]  Blood glucose monitoring   Fall precautions in place, remains injury free  Pain and nausea under control with PRN meds    Accurate I&Os  Abx given as prescribed  Bed locked at lowest position  Call light within reach    Chart check complete  Will cont with POC

## 2025-01-21 NOTE — ASSESSMENT & PLAN NOTE
01/17/2025  Patient requiring max assists with bed transitions and feeding. High intensity therapy recommended by therapists. Patient previously independent, living at home. Referrals for rehab placed at this time.      01/18/2025  Some notable objective clinical improvement. Reports decreased ROM in UE bilaterally but predominantly his right UE. Radiography of the left hand and R shoulder unremarkable. Will obtain CT cervical spine to further assess for stenosis.     01/19/2025  Cervical spine CT shows right moderate foraminal stenosis at C3-4 and C5-6 secondary to spurring of the uncovertebral joints.  Will obtain MRI of spine and consult orthospine to assess to see if findings could be contributing to his significant UE weakness and pain.     1/20/2025  Patient's daughter tells me patient independently prior to hospitalization. Patient complains of upper and lower bilateral weakness. While Prograf toxicity could play a part in progressive decline, he will need additional work up. CT head unremarkable. Kidney function has returned to baseline.  Vitamin B12 pending. Neurosurgery/Neurology consult pending. He is awaiting rehab vs SNF placement.     1/21/2025  Neurology input appreciated  Follow Vitamin B12, Thiamine, Copper  Continue PT/OT  Awaiting SNF consultation

## 2025-01-21 NOTE — SUBJECTIVE & OBJECTIVE
"HPI:with medical Hx of CHF, anemia, hyperparathyroidism, DM, s/p kidney transplant 201, DVT (apixaban), MARION, HTN, CKD, Hep presented to ED due to several days of nausea, vomiting, diarrhea.  He was found to have a elevated level of tacrolimus (possible toxicity). Pt has been consulted to neurology due to generalized weakness. He tells me that he has been declining for the last four weeks. Before coming to the hospital he could not walk and was mostly in his chair at home. Pt denies visual or speech disturbances, vertigo.     Images personally reviewed and interpreted:  Study: MRI Brain  Study Interpretation: Impression:     Atrophy and chronic microvascular ischemic changes not unusual for stated age.     No acute infarct.     Mastoid air cell fluid.  Correlate to mastoid air cell opacification.        Electronically signed by:Daniele Whatley  Date:                                            01/20/2025  Time:                                           20:57    Additional studies reviewed:   Study: Cardiac_Neuro: 2D echo  Study Interpretation:   Vitals    Height Weight BMI (Calculated) BSA (Calculated - sq m) BP Pulse   5' 6" (1.676 m) 120.7 kg (266 lb) 43 2.37 sq meters       Study Details A complete echo was performed using complete 2D, color flow Doppler and spectral Doppler. During the study, the apical, parasternal, subcostal and suprasternal views were captured. 4 mL of intravenous Definity contrast was used during the study. There was No saline (bubble) used.  Overall the study quality was technically difficult. The study was difficult due to patient's body habitus and poor endocardial visualization.     Echocardiography Findings    Left Ventricle The left ventricle is normal in size. Normal wall thickness. There is concentric hypertrophy. Normal wall motion. Septal motion is consistent with bundle branch block. There is moderately reduced systolic function. Ejection fraction is approximately 35%. Grade I " diastolic dysfunction.   Right Ventricle Normal right ventricular cavity size. Wall thickness is normal. Right ventricle wall motion  is normal. Systolic function is normal.   Left Atrium Normal left atrial size.   Right Atrium Normal right atrial size.   Aortic Valve The aortic valve is structurally normal. There is normal leaflet mobility. Aortic valve peak velocity is 1.0 m/s. Mean gradient is 2.0 mmHg. There is no significant regurgitation.   Mitral Valve The mitral valve is structurally normal. There is normal leaflet mobility. There is no significant regurgitation.   Tricuspid Valve The tricuspid valve is structurally normal. There is normal leaflet mobility. There is no significant regurgitation.   Pulmonic Valve The pulmonic valve is structurally normal. There is normal leaflet mobility. There is no significant regurgitation.   IVC/SVC Normal venous pressure at 3 mmHg.   Ascending Aorta Aortic root is normal in size measuring 3.70 cm. Ascending aorta is normal measuring 3.79 cm.   Pericardium and Other Findings There is no pericardial effusion.       Laboratory studies reviewed:  BMP:   Lab Results   Component Value Date     01/21/2025    K 4.4 01/21/2025     01/21/2025    CO2 19 (L) 01/21/2025    BUN 52 (H) 01/21/2025    CREATININE 2.4 (H) 01/21/2025    CALCIUM 9.6 01/21/2025     CBC:   Lab Results   Component Value Date    WBC 2.94 (L) 01/20/2025    WBC 2.94 (L) 01/20/2025    RBC 3.46 (L) 01/20/2025    RBC 3.46 (L) 01/20/2025    HGB 8.3 (L) 01/20/2025    HGB 8.3 (L) 01/20/2025    HCT 29.1 (L) 01/20/2025    HCT 29.1 (L) 01/20/2025    HCT 36 06/12/2015     01/20/2025     01/20/2025    MCV 84 01/20/2025    MCV 84 01/20/2025    MCH 24.0 (L) 01/20/2025    MCH 24.0 (L) 01/20/2025    MCHC 28.5 (L) 01/20/2025    MCHC 28.5 (L) 01/20/2025     Lipid Panel:   Lab Results   Component Value Date    CHOL 175 12/17/2024    LDLCALC 98.2 12/17/2024    HDL 50 12/17/2024    TRIG 134 12/17/2024      Coagulation:   Lab Results   Component Value Date    INR 1.1 01/06/2025    APTT 35.0 (H) 01/06/2025     Hgb A1C:   Lab Results   Component Value Date    HGBA1C 5.7 (H) 12/17/2024     TSH:   Lab Results   Component Value Date    TSH 0.999 03/11/2022       Documentation personally reviewed:  Notes: Notes: ED notes and H&P     Assessment and plan:    70 y/o male with generalized weakness. It seems that his symptoms have been worsening for weeks. MRI brain ans spine are unrevealing. In DDx: tacrolimus toxicity as well debility from his chronic conditions, nutritional deficiencies, autoimmune neuropathies, myopathy    -Level of thiamine, copper, vitamin E, vitamin D.   -LP is a consideration  to look for protein and cell count.  -PT/OT.    Post charge discharge plan:  Clinic follow up: Other: TBD    Visit Type: N/A  Timeframe: N/A

## 2025-01-21 NOTE — SUBJECTIVE & OBJECTIVE
Interval History:Patient is awake and alert today. Has better ROM to upper extremities today. Hematuria still noted on microscopic urine.    Review of Systems   Constitutional:  Positive for activity change and appetite change. Negative for fever.   HENT:  Negative for hearing loss.    Eyes:  Negative for visual disturbance.   Respiratory:  Negative for cough and shortness of breath.    Cardiovascular:  Positive for leg swelling. Negative for chest pain (mild nagging pain in midline) and palpitations.   Genitourinary:  Negative for difficulty urinating, dysuria, frequency and hematuria.   Musculoskeletal:  Negative for arthralgias and back pain.        Decrease ROM to lower extremities   Skin:  Positive for wound. Negative for color change and pallor.   Neurological:  Positive for weakness. Negative for seizures, syncope, numbness and headaches.   Hematological:  Does not bruise/bleed easily.   Psychiatric/Behavioral:  The patient is not nervous/anxious.      Objective:     Vital Signs (Most Recent):  Temp: 97.2 °F (36.2 °C) (01/21/25 1255)  Pulse: 81 (01/21/25 1255)  Resp: 16 (01/21/25 1255)  BP: (!) 108/53 (01/21/25 1255)  SpO2: (!) 94 % (01/21/25 1255) Vital Signs (24h Range):  Temp:  [95 °F (35 °C)-97.4 °F (36.3 °C)] 97.2 °F (36.2 °C)  Pulse:  [] 81  Resp:  [16-20] 16  SpO2:  [91 %-100 %] 94 %  BP: ()/(53-59) 108/53     Weight: 130.2 kg (287 lb 0.6 oz)  Body mass index is 44.96 kg/m².    Intake/Output Summary (Last 24 hours) at 1/21/2025 1256  Last data filed at 1/21/2025 0733  Gross per 24 hour   Intake 50 ml   Output 700 ml   Net -650 ml         Physical Exam  Constitutional:       General: He is not in acute distress.     Appearance: Normal appearance. He is well-developed. He is obese. He is ill-appearing. He is not toxic-appearing or diaphoretic.   HENT:      Head: Normocephalic and atraumatic.   Eyes:      Extraocular Movements: Extraocular movements intact.   Cardiovascular:      Rate and  Rhythm: Normal rate and regular rhythm.      Heart sounds: Normal heart sounds. No murmur heard.  Pulmonary:      Effort: Pulmonary effort is normal. No respiratory distress.      Breath sounds: Normal breath sounds. No wheezing.   Abdominal:      General: Bowel sounds are normal. There is no distension.      Palpations: Abdomen is soft. There is no mass.      Tenderness: There is no abdominal tenderness.   Musculoskeletal:         General: Swelling present.      Cervical back: Normal range of motion and neck supple.      Right lower leg: Edema present.      Left lower leg: Edema present.   Skin:     General: Skin is warm and dry.      Capillary Refill: Capillary refill takes 2 to 3 seconds.   Neurological:      General: No focal deficit present.      Mental Status: He is alert and oriented to person, place, and time.      Cranial Nerves: No cranial nerve deficit.      Motor: Weakness present.   Psychiatric:         Mood and Affect: Mood normal.         Behavior: Behavior normal.         Thought Content: Thought content normal.         Judgment: Judgment normal.           Significant Labs: All pertinent labs within the past 24 hours have been reviewed.  CBC:   Recent Labs   Lab 01/20/25  0458   WBC 2.94*  2.94*   HGB 8.3*  8.3*   HCT 29.1*  29.1*     261     CMP:   Recent Labs   Lab 01/20/25  0458 01/21/25  0547     139 137   K 4.4  4.4 4.4     108 105   CO2 17*  17* 19*   *  198* 217*   BUN 50*  50* 52*   CREATININE 2.5*  2.5* 2.4*   CALCIUM 9.7  9.7 9.6   ALBUMIN 1.2*  --    ANIONGAP 14  14 13       Significant Imaging:   MRI BRAIN WITHOUT CONTRAST     CLINICAL HISTORY:  Transient ischemic attack (TIA);Stroke, follow up;     TECHNIQUE:  Sagittal T1. Axial T1, T2, T2 FLAIR, GRE, DWI. Coronal T2 FLAIR.     COMPARISON:  None available.     FINDINGS:  There is no restricted diffusion. Moderate subcortical and periventricular FLAIR hyperintensity suggestive of chronic  microvascular ischemic changes.  Age-related atrophy.  [age]     The ventricles and sulci are normal in size and configuration. Midline structures are normal. There are preserved arterial flow-voids on T2 weighted imaging. Mastoid air cell fluid, right greater than left.     No abnormal marrow signal is identified.     Impression:     Atrophy and chronic microvascular ischemic changes not unusual for stated age.     No acute infarct.     Mastoid air cell fluid.  Correlate to mastoid air cell opacification.        Electronically signed by:Daniele Whatley  Date:                                            01/20/2025  Time:                                           20:57

## 2025-01-21 NOTE — PROGRESS NOTES
[facility] Medicine  Progress Note    Patient Name: Mitch Whittaker  MRN: [id number]  Patient Class: IP- Inpatient   Admission Date: 1/12/2025  Length of Stay: 9 days  Attending Physician: Carlos Mathew MD  Primary Care Provider: Valery Caal MD    Subjective     Principal Problem:Physical deconditioning    HPI:  Mitch Whittaker is a 69 y.o. male patient with a PMHx of CHF- EF 40%, anemia, hyperparathyroidism, type 2 DM, s/p kidney transplant 2015 on Prograf and Cellcept, DVT on Eliquis, MARION, HLD, HTN, CKD, Hep C, and arthritis who presents to the Emergency Department for evaluation of nausea vomiting and diarrhea over the past several days (both of which have improved), but felt weak and couldn't move around like normal. no abd pain, no systemic symptoms, stool now formed. Pt is a very poor historian. According to nursing staff, family reported that the pt has been sitting in his chair for the last 3 days. Pt reports that he has had two episodes of diarrhea since yesterday, but due to the increasing weakness in his knees he was unable to get up from his chair. Pt normally ambulates with assistance from a walker. Symptoms are constant and moderate in severity. Associated sxs include blood in stool (x4 days) and n/v yesterday, but none today after PO. Patient denies any fever, abdominal pain, appetite changes, SOB, dysuria, and all other sxs at this time. No prior tx. Pt is on Eliquis. No further complaints or concerns at this time.   In the ER, VS /65, , Sats 100% on RA, Afeb, WBC 3.7, H/H 9.5/33, Bun/Cr 82/4.3, K 6.7- treated aggressively in the ER and rechecked and repeat 5.4. He is heme positive as well. C Diff Neg. She is being admitted for possible Hyperkalemia, acute GI Bleed, ANGELITO.     Overview/Hospital Course:  69 y.o. male patient with a PMHx of CHF- EF 40%, anemia, hyperparathyroidism, type 2 DM, s/p kidney transplant 2015 on Prograf and Cellcept, DVT on  Eliquis, MARION, HLD, HTN, CKD, Hep C, and arthritis who presents to the Emergency Department for evaluation of nausea vomiting and diarrhea over the past several days (both of which have improved), but felt weak and couldn't move around like normal. no abd pain, no systemic symptoms, stool now formed. Pt is a very poor historian. According to nursing staff, family reported that the pt has been sitting in his chair for the last 3 days. Pt reports that he has had two episodes of diarrhea since yesterday, but due to the increasing weakness in his knees he was unable to get up from his chair. Pt normally ambulates with assistance from a walker. Symptoms are constant and moderate in severity. Associated sxs include blood in stool (x4 days) and n/v yesterday, but none today after PO. Patient denies any fever, abdominal pain, appetite changes, SOB, dysuria, and all other sxs at this time. No prior tx. Pt is on Eliquis. No further complaints or concerns at this time.   In the ER, VS /65, , Sats 100% on RA, Afeb, WBC 3.7, H/H 9.5/33, Bun/Cr 82/4.3, K 6.7- treated aggressively in the ER and rechecked and repeat 5.4. He is heme positive as well. C Diff Neg. She is being admitted for possible Hyperkalemia, acute GI Bleed, ANGELITO.     1/13- Appreciate Renal and GI input- pt looks and feels a little better, stronger, more alert and responsive. Wife and daughter are here. His prograf level very high- 32- hence Held. It seems that he was getting Prograf toxicity at home which then resulted in Hematuria, ABL Anemia, ANGELITO and Hyperkalemia. Pt also felt weak and low sugars, so drank a lot of OJ which further raised his K level. Does not appears to have true GI bleed. But has hematuria- hence Purewick catheter placed and Dr. Bennett also started him on IVF- hematuria appears to be clearing. Pt feeling better, will check labs in am and start PT/OT. K was 6.1 this am- started on lokelma.     1/14- looks a little better, no melena  but still has hematuria. H/h stable, 8.3/28, K still 6, Bun.Cr still high 83/4. Repeat Prograf level pending. VSS Afeb, he is more alert and talking. Wound care wrapped his legs with moderate compression. Had mod R SFA stenosis, vascular evaluated him and will continue to monitor him, no surgery or angioplasty needed at this time. Will start PT/OT in am. Cont IVF, If Hematuria persists, start CBI in am.     1/15- looks better but c/o pain in his legs which are in a compression socks. Good response to iVF, Hematuria getting better and Cr down to 3 now with good urine output. H/H dropped to 7.2/22 sec to IVF and Hematuria. Still await repeat prograf level. Getting PT/OT, started on Norco for pain control.      1/16- resting quietly, comfortable, making good amount of urine, hematuria almost cleared with IVF. Bun/Cr down to 58/2.9. K normal, lokelm d/koki. H/H improved to 7.7/26. Prograf noiw 5.2, will restart at 3 mg bid. Stop hydration in am, start PT/OT. D/c home soon.     01/17/2025  Patient requiring max assists with bed transitions and feeding. High intensity therapy recommended by therapists. Patient previously independent, living at home. Referrals for rehab placed at this time.      01/18/2025  Some notable objective clinical improvement. Reports decreased ROM in UE bilaterally but predominantly his right UE. Radiography of the left hand and R shoulder unremarkable. Will obtain CT cervical spine to further assess for stenosis.     01/19/2025  Cervical spine CT shows right moderate foraminal stenosis at C3-4 and C5-6 secondary to spurring of the uncovertebral joints.  Will obtain MRI of spine and consult orthospine to assess to see if findings could be contributing to his significant UE weakness and pain.     1/20/2025:   MRI Cervical/Thoracic/Lumbar showed multilevel spondylosis mild to moderate but no acute findings. Patient's daughter tells me patient independently prior to hospitalization. Patient complains  of upper and lower bilateral weakness. While Prograf toxicity could play a part in progressive decline, he will need additional work up. CT head unremarkable. Kidney function has returned to baseline.  Vitamin B12 pending. Neurosurgery/Neurology consult pending. He is awaiting rehab vs SNF placement.     1/21/2025  MRI brain shows atrophy and microvascular changes but no acute findings. His progressive weakness was evaluated by Neurology who recommended obtaining thiamine, copper, vitamin E, and vitamin D level. Continues to have hematuria on UA. Order high risk urine culture and empirically start Rocephin. Will consider Urology consult pending hospital course.    Interval History:Patient is awake and alert today. Has better ROM to upper extremities today. Hematuria still noted on microscopic urine.    Review of Systems   Constitutional:  Positive for activity change and appetite change. Negative for fever.   HENT:  Negative for hearing loss.    Eyes:  Negative for visual disturbance.   Respiratory:  Negative for cough and shortness of breath.    Cardiovascular:  Positive for leg swelling. Negative for chest pain (mild nagging pain in midline) and palpitations.   Genitourinary:  Negative for difficulty urinating, dysuria, frequency and hematuria.   Musculoskeletal:  Negative for arthralgias and back pain.        Decrease ROM to lower extremities   Skin:  Positive for wound. Negative for color change and pallor.   Neurological:  Positive for weakness. Negative for seizures, syncope, numbness and headaches.   Hematological:  Does not bruise/bleed easily.   Psychiatric/Behavioral:  The patient is not nervous/anxious.      Objective:     Vital Signs (Most Recent):  Temp: 97.2 °F (36.2 °C) (01/21/25 1255)  Pulse: 81 (01/21/25 1255)  Resp: 16 (01/21/25 1255)  BP: (!) 108/53 (01/21/25 1255)  SpO2: (!) 94 % (01/21/25 1255) Vital Signs (24h Range):  Temp:  [95 °F (35 °C)-97.4 °F (36.3 °C)] 97.2 °F (36.2 °C)  Pulse:  []  81  Resp:  [16-20] 16  SpO2:  [91 %-100 %] 94 %  BP: ()/(53-59) 108/53     Weight: 130.2 kg (287 lb 0.6 oz)  Body mass index is 44.96 kg/m².    Intake/Output Summary (Last 24 hours) at 1/21/2025 1256  Last data filed at 1/21/2025 0733  Gross per 24 hour   Intake 50 ml   Output 700 ml   Net -650 ml         Physical Exam  Constitutional:       General: He is not in acute distress.     Appearance: Normal appearance. He is well-developed. He is obese. He is ill-appearing. He is not toxic-appearing or diaphoretic.   HENT:      Head: Normocephalic and atraumatic.   Eyes:      Extraocular Movements: Extraocular movements intact.   Cardiovascular:      Rate and Rhythm: Normal rate and regular rhythm.      Heart sounds: Normal heart sounds. No murmur heard.  Pulmonary:      Effort: Pulmonary effort is normal. No respiratory distress.      Breath sounds: Normal breath sounds. No wheezing.   Abdominal:      General: Bowel sounds are normal. There is no distension.      Palpations: Abdomen is soft. There is no mass.      Tenderness: There is no abdominal tenderness.   Musculoskeletal:         General: Swelling present.      Cervical back: Normal range of motion and neck supple.      Right lower leg: Edema present.      Left lower leg: Edema present.   Skin:     General: Skin is warm and dry.      Capillary Refill: Capillary refill takes 2 to 3 seconds.   Neurological:      General: No focal deficit present.      Mental Status: He is alert and oriented to person, place, and time.      Cranial Nerves: No cranial nerve deficit.      Motor: Weakness present.   Psychiatric:         Mood and Affect: Mood normal.         Behavior: Behavior normal.         Thought Content: Thought content normal.         Judgment: Judgment normal.           Significant Labs: All pertinent labs within the past 24 hours have been reviewed.  CBC:   Recent Labs   Lab 01/20/25  0458   WBC 2.94*  2.94*   HGB 8.3*  8.3*   HCT 29.1*  29.1*      261     CMP:   Recent Labs   Lab 01/20/25  0458 01/21/25  0547     139 137   K 4.4  4.4 4.4     108 105   CO2 17*  17* 19*   *  198* 217*   BUN 50*  50* 52*   CREATININE 2.5*  2.5* 2.4*   CALCIUM 9.7  9.7 9.6   ALBUMIN 1.2*  --    ANIONGAP 14  14 13       Significant Imaging:   MRI BRAIN WITHOUT CONTRAST     CLINICAL HISTORY:  Transient ischemic attack (TIA);Stroke, follow up;     TECHNIQUE:  Sagittal T1. Axial T1, T2, T2 FLAIR, GRE, DWI. Coronal T2 FLAIR.     COMPARISON:  None available.     FINDINGS:  There is no restricted diffusion. Moderate subcortical and periventricular FLAIR hyperintensity suggestive of chronic microvascular ischemic changes.  Age-related atrophy.  [age]     The ventricles and sulci are normal in size and configuration. Midline structures are normal. There are preserved arterial flow-voids on T2 weighted imaging. Mastoid air cell fluid, right greater than left.     No abnormal marrow signal is identified.     Impression:     Atrophy and chronic microvascular ischemic changes not unusual for stated age.     No acute infarct.     Mastoid air cell fluid.  Correlate to mastoid air cell opacification.        Electronically signed by:Daniele Whatley  Date:                                            01/20/2025  Time:                                           20:57    Assessment and Plan     * Severe physical deconditioning  01/17/2025  Patient requiring max assists with bed transitions and feeding. High intensity therapy recommended by therapists. Patient previously independent, living at home. Referrals for rehab placed at this time.      01/18/2025  Some notable objective clinical improvement. Reports decreased ROM in UE bilaterally but predominantly his right UE. Radiography of the left hand and R shoulder unremarkable. Will obtain CT cervical spine to further assess for stenosis.     01/19/2025  Cervical spine CT shows right moderate foraminal stenosis at C3-4 and C5-6  secondary to spurring of the uncovertebral joints.  Will obtain MRI of spine and consult orthospine to assess to see if findings could be contributing to his significant UE weakness and pain.     1/20/2025  Patient's daughter tells me patient independently prior to hospitalization. Patient complains of upper and lower bilateral weakness. While Prograf toxicity could play a part in progressive decline, he will need additional work up. CT head unremarkable. Kidney function has returned to baseline.  Vitamin B12 pending. Neurosurgery/Neurology consult pending. He is awaiting rehab vs SNF placement.     1/21/2025  Neurology input appreciated  Follow Vitamin B12, Thiamine, Copper  Continue PT/OT  Awaiting SNF consultation    Tacrolimus-induced GI toxicity  Prograf level very high  Prograf on hold    Await repeat Prograf level    Repeat levels normal- will restart Prograf from am at 3 mg bid      Gross hematuria  Stable, has Purewick catheter now  Getting IVF, if gets worse, may need CBI    Still persists but a little better  If no improvement tomorrow- will try CBI    Improving with good Urine output, CBI not needed  Cont close monitoring      Improving, cont IVF    1/20/25  Improved  IVFs discontinued    1/21/25  Microscopic urine >100  Add high risk urine culture  Empirically place on Rocephin  Consider Urology consultation    CKD (chronic kidney disease) stage 4, GFR 15-29 ml/min  Creatine stable for now. BMP reviewed- noted Estimated Creatinine Clearance: 36.6 mL/min (A) (based on SCr of 2.4 mg/dL (H)). according to latest data. Based on current GFR, CKD stage is stage 4 - GFR 15-29.  Monitor UOP and serial BMP and adjust therapy as needed. Renally dose meds. Avoid nephrotoxic medications and procedures.  Pt is d/p Renal Transplant in 2015, on Prograf and Cellcept    Edwina on CKD 4- sec to Prograf toxicity- Bleeding- started on IVF  Cont IVF  Improving with IVF, Cr coming down to  3    01/21/2025  Stable  Immunosuppressant therapy restarted    ABL Anemia plus anemia of CKD 4      Anemia is likely due to acute blood loss which was from CKD, s/p Renal transplant and chronic disease due to Chronic Kidney Disease. Most recent hemoglobin and hematocrit are listed below.  Recent Labs     01/19/25  0611 01/20/25  0458   HGB 8.6* 8.3*  8.3*   HCT 29.8* 29.1*  29.1*       01/21/2025  stable      VTE Risk Mitigation (From admission, onward)           Ordered     Reason for No Pharmacological VTE Prophylaxis  Once        Question:  Reasons:  Answer:  Active Bleeding    01/12/25 1729     IP VTE HIGH RISK PATIENT  Once         01/12/25 1729     Place sequential compression device  Until discontinued         01/12/25 1729                    Discharge Planning   GRETEL:      Code Status: Full Code   Medical Readiness for Discharge Date:   Discharge Plan A: Other (Pending Progression)          Darya Maravilla NP  Department of Hospital Medicine   [facility] [facility] ([facility])

## 2025-01-21 NOTE — TELEMEDICINE CONSULT
"[facility]   General Neurology  Consult Note      Consult Information  Inpatient consult to Telemedicine-General Neurology  Consult performed by: Kimo Andre MD  Consult ordered by: Darya Maravilla NP  Reason for consult: Weakness        Consulting Provider:    Current Providers  No providers found    Patient Location:  [facility] MED/TELE 2 IP Unit    Spoke hospital nurse at bedside with patient assisting consultant.  Patient information was obtained from patient and ER records.       Neurology Documentation       Blood pressure (!) 95/53, pulse 90, temperature 97.4 °F (36.3 °C), temperature source Oral, resp. rate 20, height 5' 7" (1.702 m), weight 130.2 kg (287 lb 0.6 oz), SpO2 95%.    Medical Decision Making  HPI:with medical Hx of CHF, anemia, hyperparathyroidism, DM, s/p kidney transplant 201, DVT (apixaban), MARION, HTN, CKD, Hep presented to ED due to several days of nausea, vomiting, diarrhea.  He was found to have a elevated level of tacrolimus (possible toxicity). Pt has been consulted to neurology due to generalized weakness. He tells me that he has been declining for the last four weeks. Before coming to the hospital he could not walk and was mostly in his chair at home. Pt denies visual or speech disturbances, vertigo.     Images personally reviewed and interpreted:  Study: MRI Brain  Study Interpretation: Impression:     Atrophy and chronic microvascular ischemic changes not unusual for stated age.     No acute infarct.     Mastoid air cell fluid.  Correlate to mastoid air cell opacification.        Electronically signed by:Daniele Whatley  Date:                                            01/20/2025  Time:                                           20:57    Additional studies reviewed:   Study: Cardiac_Neuro: 2D echo  Study Interpretation:   Vitals    Height Weight BMI (Calculated) BSA (Calculated - sq m) BP Pulse   5' 6" (1.676 m) 120.7 kg (266 lb) 43 2.37 sq meters       Study Details A complete " echo was performed using complete 2D, color flow Doppler and spectral Doppler. During the study, the apical, parasternal, subcostal and suprasternal views were captured. 4 mL of intravenous Definity contrast was used during the study. There was No saline (bubble) used.  Overall the study quality was technically difficult. The study was difficult due to patient's body habitus and poor endocardial visualization.     Echocardiography Findings    Left Ventricle The left ventricle is normal in size. Normal wall thickness. There is concentric hypertrophy. Normal wall motion. Septal motion is consistent with bundle branch block. There is moderately reduced systolic function. Ejection fraction is approximately 35%. Grade I diastolic dysfunction.   Right Ventricle Normal right ventricular cavity size. Wall thickness is normal. Right ventricle wall motion  is normal. Systolic function is normal.   Left Atrium Normal left atrial size.   Right Atrium Normal right atrial size.   Aortic Valve The aortic valve is structurally normal. There is normal leaflet mobility. Aortic valve peak velocity is 1.0 m/s. Mean gradient is 2.0 mmHg. There is no significant regurgitation.   Mitral Valve The mitral valve is structurally normal. There is normal leaflet mobility. There is no significant regurgitation.   Tricuspid Valve The tricuspid valve is structurally normal. There is normal leaflet mobility. There is no significant regurgitation.   Pulmonic Valve The pulmonic valve is structurally normal. There is normal leaflet mobility. There is no significant regurgitation.   IVC/SVC Normal venous pressure at 3 mmHg.   Ascending Aorta Aortic root is normal in size measuring 3.70 cm. Ascending aorta is normal measuring 3.79 cm.   Pericardium and Other Findings There is no pericardial effusion.       Laboratory studies reviewed:  BMP:   Lab Results   Component Value Date     01/21/2025    K 4.4 01/21/2025     01/21/2025    CO2 19 (L)  01/21/2025    BUN 52 (H) 01/21/2025    CREATININE 2.4 (H) 01/21/2025    CALCIUM 9.6 01/21/2025     CBC:   Lab Results   Component Value Date    WBC 2.94 (L) 01/20/2025    WBC 2.94 (L) 01/20/2025    RBC 3.46 (L) 01/20/2025    RBC 3.46 (L) 01/20/2025    HGB 8.3 (L) 01/20/2025    HGB 8.3 (L) 01/20/2025    HCT 29.1 (L) 01/20/2025    HCT 29.1 (L) 01/20/2025    HCT 36 06/12/2015     01/20/2025     01/20/2025    MCV 84 01/20/2025    MCV 84 01/20/2025    MCH 24.0 (L) 01/20/2025    MCH 24.0 (L) 01/20/2025    MCHC 28.5 (L) 01/20/2025    MCHC 28.5 (L) 01/20/2025     Lipid Panel:   Lab Results   Component Value Date    CHOL 175 12/17/2024    LDLCALC 98.2 12/17/2024    HDL 50 12/17/2024    TRIG 134 12/17/2024     Coagulation:   Lab Results   Component Value Date    INR 1.1 01/06/2025    APTT 35.0 (H) 01/06/2025     Hgb A1C:   Lab Results   Component Value Date    HGBA1C 5.7 (H) 12/17/2024     TSH:   Lab Results   Component Value Date    TSH 0.999 03/11/2022       Documentation personally reviewed:  Notes: Notes: ED notes and H&P     Assessment and plan:    68 y/o male with generalized weakness. It seems that his symptoms have been worsening for weeks. MRI brain ans spine are unrevealing. In DDx: tacrolimus toxicity as well debility from his chronic conditions, nutritional deficiencies, autoimmune neuropathies, myopathy    -Level of thiamine, copper, vitamin E, vitamin D.   -LP is a consideration  to look for protein and cell count.  -PT/OT.    Post charge discharge plan:  Clinic follow up: Other: TBD    Visit Type: N/A  Timeframe: N/A        Additional Physical Exam, History, & ROS  Review of Systems   Constitutional:  Negative for fever.   HENT:  Negative for hearing loss.    Eyes:  Negative for pain.   Respiratory:  Negative for cough.    Cardiovascular:  Negative for chest pain.   Gastrointestinal:  Negative for abdominal pain.   Genitourinary:  Negative for flank pain.   Musculoskeletal:  Negative for myalgias.    Skin:  Negative for rash.   Neurological:  Positive for weakness. Negative for headaches.     Physical Exam  Constitutional:       General: He is not in acute distress.  Pulmonary:      Effort: No respiratory distress.   Neurological:      Mental Status: He is alert.      Cranial Nerves: No dysarthria or facial asymmetry.      Comments: Oriented to person, place.    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 RNA NEGATIVE 5/2015, 2021, 2022    History of colon polyps     HPTH (hyperparathyroidism)     Hyperlipidemia     Hypertension associated with stage 3 chronic kidney disease due to type 2 diabetes mellitus     LBBB (left bundle branch block) 12/20/2021    Morbid obesity with BMI of 45.0-49.9, adult     Nephrolithiasis 6/7/2013    PCO (posterior capsular opacification), left 03/04/2019    Proteinuria     resolved s/p transplant    S/P kidney transplant     Sleep apnea     Type 2 diabetes, uncontrolled, with retinopathy     Type II diabetes mellitus with renal manifestations      Past Surgical History:   Procedure Laterality Date    CARDIAC CATHETERIZATION  01/01/2008    normal coronary    CARPAL TUNNEL RELEASE Right 12/01/2023    Procedure: RELEASE, CARPAL  TUNNEL;  Surgeon: Noel Almonte MD;  Location: [facility] OR;  Service: Orthopedics;  Laterality: Right;    CARPAL TUNNEL RELEASE Left 7/18/2024    Procedure: RELEASE, CARPAL TUNNEL;  Surgeon: Noel Almonte MD;  Location: [facility] OR;  Service: Orthopedics;  Laterality: Left;    COLONOSCOPY N/A 04/05/2018    Procedure: COLONOSCOPY;  Surgeon: Chava Ronquillo MD;  Location: [facility] ENDO;  Service: Endoscopy;  Laterality: N/A;    COLONOSCOPY N/A 05/02/2022    Procedure: COLONOSCOPY;  Surgeon: Alix Puente MD;  Location: [facility] ENDO;  Service: Endoscopy;  Laterality: N/A;    COLONOSCOPY N/A 06/07/2023    Procedure: COLONOSCOPY - rule out CMV  Cardiac clearance/Eliquis hold approval received on 05/21/23 per Dr. Meade, cardiology.  Note in encounters.  LB;  Surgeon: Daniella Shah MD;  Location: [facility] ENDO;  Service: Endoscopy;  Laterality: N/A;    ESOPHAGOGASTRODUODENOSCOPY N/A 03/26/2024    Procedure: EGD (ESOPHAGOGASTRODUODENOSCOPY) 3/1-pt cleared, ok to hold eliquis for 3 days;  Surgeon: Daniella Shah MD;  Location: [facility] ENDO;  Service: Endoscopy;  Laterality: N/A;    KIDNEY TRANSPLANT  2015    RETINAL LASER PROCEDURE       Family History   Problem Relation Name Age of Onset    Diabetes Mother      Hypertension Mother      Heart failure Mother      Heart failure Father      Kidney disease Sister          ESRD    Diabetes Sister      Diabetes Maternal Grandmother      Cancer Neg Hx         Diagnoses  No problems updated.    Kimo Andre MD    Neurology consultation requested by spoke provider. Audiovisual encounter with the patient performed using a secure connection.  Results and impressions from the visit are documented on this note and were communicated to the consulting provider/team via direct communication. The note has been shared for addition to the patients electronic medical record.

## 2025-01-21 NOTE — ASSESSMENT & PLAN NOTE
Anemia is likely due to acute blood loss which was from CKD, s/p Renal transplant and chronic disease due to Chronic Kidney Disease. Most recent hemoglobin and hematocrit are listed below.  Recent Labs     01/19/25  0611 01/20/25  0458   HGB 8.6* 8.3*  8.3*   HCT 29.8* 29.1*  29.1*       01/21/2025  stable

## 2025-01-21 NOTE — PLAN OF CARE
Problem: Adult Inpatient Plan of Care  Goal: Plan of Care Review  Outcome: Progressing  Goal: Patient-Specific Goal (Individualized)  Outcome: Progressing  Goal: Absence of Hospital-Acquired Illness or Injury  Outcome: Progressing  Goal: Optimal Comfort and Wellbeing  Outcome: Progressing  Goal: Readiness for Transition of Care  Outcome: Progressing     Problem: Bariatric Environmental Safety  Goal: Safety Maintained with Care  Outcome: Progressing     Problem: Diabetes Comorbidity  Goal: Blood Glucose Level Within Targeted Range  Outcome: Progressing     Problem: Wound  Goal: Optimal Coping  Outcome: Progressing  Goal: Optimal Functional Ability  Outcome: Progressing  Goal: Absence of Infection Signs and Symptoms  Outcome: Progressing  Goal: Improved Oral Intake  Outcome: Progressing  Goal: Optimal Pain Control and Function  Outcome: Progressing  Goal: Skin Health and Integrity  Outcome: Progressing  Goal: Optimal Wound Healing  Outcome: Progressing     Problem: Skin Injury Risk Increased  Goal: Skin Health and Integrity  Outcome: Progressing     Problem: Pain Acute  Goal: Optimal Pain Control and Function  Outcome: Progressing     Problem: Infection  Goal: Absence of Infection Signs and Symptoms  Outcome: Progressing   POC reviewed with pt. Pt verbalizes understanding of POC. No questions at this time.  AAOx4. NADN.  NSR on cardiac monitor.  Pt remains free of falls.  Complains of pain upon touch. Turned q2h  Safety measures in place. Will continue to monitor.  Informed pt to call for assistance before getting up. Pt verbalizes understanding.  Hourly rounding and chart check complete.

## 2025-01-21 NOTE — ASSESSMENT & PLAN NOTE
Creatine stable for now. BMP reviewed- noted Estimated Creatinine Clearance: 36.6 mL/min (A) (based on SCr of 2.4 mg/dL (H)). according to latest data. Based on current GFR, CKD stage is stage 4 - GFR 15-29.  Monitor UOP and serial BMP and adjust therapy as needed. Renally dose meds. Avoid nephrotoxic medications and procedures.  Pt is d/p Renal Transplant in 2015, on Prograf and Cellcept    Edwina on CKD 4- sec to Prograf toxicity- Bleeding- started on IVF  Cont IVF  Improving with IVF, Cr coming down to 3    01/21/2025  Stable  Immunosuppressant therapy restarted

## 2025-01-22 LAB
ALBUMIN SERPL BCP-MCNC: 1.3 G/DL (ref 3.5–5.2)
ALBUMIN SERPL ELPH-MCNC: 1.64 G/DL (ref 3.35–5.55)
ALPHA1 GLOB SERPL ELPH-MCNC: 0.69 G/DL (ref 0.17–0.41)
ALPHA2 GLOB SERPL ELPH-MCNC: 1.07 G/DL (ref 0.43–0.99)
ANION GAP SERPL CALC-SCNC: 11 MMOL/L (ref 8–16)
B-GLOBULIN SERPL ELPH-MCNC: 0.53 G/DL (ref 0.5–1.1)
BUN SERPL-MCNC: 56 MG/DL (ref 8–23)
CALCIUM SERPL-MCNC: 9.6 MG/DL (ref 8.7–10.5)
CHLORIDE SERPL-SCNC: 106 MMOL/L (ref 95–110)
CO2 SERPL-SCNC: 19 MMOL/L (ref 23–29)
CREAT SERPL-MCNC: 2.2 MG/DL (ref 0.5–1.4)
EST. GFR  (NO RACE VARIABLE): 31 ML/MIN/1.73 M^2
GAMMA GLOB SERPL ELPH-MCNC: 0.37 G/DL (ref 0.67–1.58)
GLUCOSE SERPL-MCNC: 196 MG/DL (ref 70–110)
PATHOLOGIST INTERPRETATION SPE: NORMAL
PHOSPHATE SERPL-MCNC: 4.6 MG/DL (ref 2.7–4.5)
POCT GLUCOSE: 201 MG/DL (ref 70–110)
POCT GLUCOSE: 218 MG/DL (ref 70–110)
POCT GLUCOSE: 234 MG/DL (ref 70–110)
POCT GLUCOSE: 269 MG/DL (ref 70–110)
POTASSIUM SERPL-SCNC: 4.3 MMOL/L (ref 3.5–5.1)
PROT SERPL-MCNC: 4.3 G/DL (ref 6–8.4)
SODIUM SERPL-SCNC: 136 MMOL/L (ref 136–145)

## 2025-01-22 PROCEDURE — 21400001 HC TELEMETRY ROOM

## 2025-01-22 PROCEDURE — 82525 ASSAY OF COPPER: CPT | Performed by: NURSE PRACTITIONER

## 2025-01-22 PROCEDURE — 36415 COLL VENOUS BLD VENIPUNCTURE: CPT | Performed by: INTERNAL MEDICINE

## 2025-01-22 PROCEDURE — 25000003 PHARM REV CODE 250: Performed by: INTERNAL MEDICINE

## 2025-01-22 PROCEDURE — 87040 BLOOD CULTURE FOR BACTERIA: CPT | Performed by: INTERNAL MEDICINE

## 2025-01-22 PROCEDURE — 25000003 PHARM REV CODE 250: Performed by: HOSPITALIST

## 2025-01-22 PROCEDURE — 25000003 PHARM REV CODE 250: Performed by: EMERGENCY MEDICINE

## 2025-01-22 PROCEDURE — A4216 STERILE WATER/SALINE, 10 ML: HCPCS | Performed by: EMERGENCY MEDICINE

## 2025-01-22 PROCEDURE — 63600175 PHARM REV CODE 636 W HCPCS: Performed by: EMERGENCY MEDICINE

## 2025-01-22 PROCEDURE — 80069 RENAL FUNCTION PANEL: CPT | Performed by: INTERNAL MEDICINE

## 2025-01-22 PROCEDURE — 25000003 PHARM REV CODE 250: Performed by: PHYSICIAN ASSISTANT

## 2025-01-22 PROCEDURE — 63600175 PHARM REV CODE 636 W HCPCS: Performed by: NURSE PRACTITIONER

## 2025-01-22 PROCEDURE — 25000003 PHARM REV CODE 250: Performed by: STUDENT IN AN ORGANIZED HEALTH CARE EDUCATION/TRAINING PROGRAM

## 2025-01-22 PROCEDURE — 84425 ASSAY OF VITAMIN B-1: CPT | Performed by: NURSE PRACTITIONER

## 2025-01-22 PROCEDURE — 99232 SBSQ HOSP IP/OBS MODERATE 35: CPT | Mod: ,,, | Performed by: INTERNAL MEDICINE

## 2025-01-22 PROCEDURE — 36415 COLL VENOUS BLD VENIPUNCTURE: CPT | Performed by: NURSE PRACTITIONER

## 2025-01-22 PROCEDURE — 63600175 PHARM REV CODE 636 W HCPCS: Performed by: STUDENT IN AN ORGANIZED HEALTH CARE EDUCATION/TRAINING PROGRAM

## 2025-01-22 RX ORDER — TAMSULOSIN HYDROCHLORIDE 0.4 MG/1
0.4 CAPSULE ORAL NIGHTLY
Status: DISCONTINUED | OUTPATIENT
Start: 2025-01-22 | End: 2025-01-29 | Stop reason: HOSPADM

## 2025-01-22 RX ORDER — MUPIROCIN 20 MG/G
OINTMENT TOPICAL 2 TIMES DAILY
Status: COMPLETED | OUTPATIENT
Start: 2025-01-22 | End: 2025-01-26

## 2025-01-22 RX ADMIN — INSULIN GLARGINE 10 UNITS: 100 INJECTION, SOLUTION SUBCUTANEOUS at 08:01

## 2025-01-22 RX ADMIN — LIDOCAINE 5% 2 PATCH: 700 PATCH TOPICAL at 09:01

## 2025-01-22 RX ADMIN — TACROLIMUS 3 MG: 1 CAPSULE ORAL at 08:01

## 2025-01-22 RX ADMIN — PANTOPRAZOLE SODIUM 40 MG: 40 TABLET, DELAYED RELEASE ORAL at 08:01

## 2025-01-22 RX ADMIN — GABAPENTIN 300 MG: 300 CAPSULE ORAL at 08:01

## 2025-01-22 RX ADMIN — INSULIN ASPART 4 UNITS: 100 INJECTION, SOLUTION INTRAVENOUS; SUBCUTANEOUS at 04:01

## 2025-01-22 RX ADMIN — Medication 10 ML: at 02:01

## 2025-01-22 RX ADMIN — INSULIN ASPART 2 UNITS: 100 INJECTION, SOLUTION INTRAVENOUS; SUBCUTANEOUS at 07:01

## 2025-01-22 RX ADMIN — Medication 10 ML: at 08:01

## 2025-01-22 RX ADMIN — MYCOPHENOLATE MOFETIL 500 MG: 500 TABLET, FILM COATED ORAL at 08:01

## 2025-01-22 RX ADMIN — MUPIROCIN: 20 OINTMENT TOPICAL at 08:01

## 2025-01-22 RX ADMIN — MUPIROCIN: 20 OINTMENT TOPICAL at 09:01

## 2025-01-22 RX ADMIN — NYSTATIN 500000 UNITS: 100000 SUSPENSION ORAL at 05:01

## 2025-01-22 RX ADMIN — TACROLIMUS 3 MG: 1 CAPSULE ORAL at 05:01

## 2025-01-22 RX ADMIN — INSULIN ASPART 4 UNITS: 100 INJECTION, SOLUTION INTRAVENOUS; SUBCUTANEOUS at 05:01

## 2025-01-22 RX ADMIN — NYSTATIN 500000 UNITS: 100000 SUSPENSION ORAL at 02:01

## 2025-01-22 RX ADMIN — INSULIN ASPART 6 UNITS: 100 INJECTION, SOLUTION INTRAVENOUS; SUBCUTANEOUS at 11:01

## 2025-01-22 RX ADMIN — NYSTATIN 500000 UNITS: 100000 SUSPENSION ORAL at 08:01

## 2025-01-22 RX ADMIN — CEFTRIAXONE 1 G: 1 INJECTION, POWDER, FOR SOLUTION INTRAMUSCULAR; INTRAVENOUS at 02:01

## 2025-01-22 RX ADMIN — GABAPENTIN 300 MG: 300 CAPSULE ORAL at 02:01

## 2025-01-22 RX ADMIN — PREDNISONE 5 MG: 5 TABLET ORAL at 08:01

## 2025-01-22 RX ADMIN — TAMSULOSIN HYDROCHLORIDE 0.4 MG: 0.4 CAPSULE ORAL at 08:01

## 2025-01-22 RX ADMIN — Medication 10 ML: at 05:01

## 2025-01-22 RX ADMIN — METOPROLOL SUCCINATE 25 MG: 25 TABLET, FILM COATED, EXTENDED RELEASE ORAL at 08:01

## 2025-01-22 NOTE — PROGRESS NOTES
Inpatient Nutrition Assessment    Admit Date: 1/12/2025   Total duration of encounter: 10 days   Patient Age: 69 y.o.    Nutrition Recommendation/Prescription     Consider liberalizing to diabetic diet d/t poor intake at meals. Encourage intake.  Will add Suplena supplements for additional kcal. Encourage intake.  Weigh patient/update weight recording.  Last documented BM 1/18/25. Consider scheduled bowel regimen if accurate.   Monitor % intake meals + supplements, weight, I/O, labs/BG trends.    Communication of Recommendations:  EMR    Nutrition Assessment     Malnutrition Assessment/Nutrition-Focused Physical Exam       Malnutrition Level: other (see comments) (unable to determine) (01/22/25 1339)  Energy Intake (Malnutrition): less than 75% for greater than 7 days (per flowsheet documentation) (01/22/25 1339)  Weight Loss (Malnutrition): other (see comments) (unable to assess) (01/22/25 1339)  Subcutaneous Fat (Malnutrition): other (see comments) (unable to assess - dieititan working remotely) (01/22/25 1339)           Muscle Mass (Malnutrition): other (see comments) (unable to assess - dietitian working remotely) (01/22/25 1339)                          Fluid Accumulation (Malnutrition): moderate (2-3+ edema per flowsheets) (01/22/25 1339)        A minimum of two characteristics is recommended for diagnosis of either severe or non-severe malnutrition.    Chart Review    Reason Seen: length of stay    Malnutrition Screening Tool Results   Have you recently lost weight without trying?: Yes: 2-13 lbs  Have you been eating poorly because of a decreased appetite?: No   MST Score: 1   Diagnosis:  Severe physical deconditioning  Tacrolimus-induced GI toxicity  Gross hematuria  CKD Stage 4, GFR 15-29 ml/min  ABL anemia plus anemia of CKD 4    Relevant Medical History: CHF, anemia a/w chronic renal failure, hyperparathyroidism, type 2 DM with renal manifestations, hypertension, ESRD on HD (prior to transplant), HLD,  CAD, chronic immunosuppression, s/p kidney transplant (2015), hepatitis C, sleep apnea, morbid obesity    Scheduled Medications:  cefTRIAXone (Rocephin) IV (PEDS and ADULTS), 1 g, Q24H  gabapentin, 300 mg, TID  insulin glargine U-100, 10 Units, BID  LIDOcaine, 2 patch, Q24H  metoprolol succinate, 25 mg, Daily  mupirocin, , BID  mycophenolate, 500 mg, BID  nystatin, 500,000 Units, QID  pantoprazole, 40 mg, BID  predniSONE, 5 mg, Daily  sodium chloride 0.9%, 10 mL, Q8H  tacrolimus, 3 mg, BID  tamsulosin, 0.4 mg, QHS    Continuous Infusions:   PRN Medications:  0.9%  NaCl infusion (for blood administration), , Q24H PRN  acetaminophen, 650 mg, Q4H PRN  dextrose 50%, 12.5 g, PRN  dextrose 50%, 25 g, PRN  glucagon (human recombinant), 1 mg, PRN  glucose, 16 g, PRN  glucose, 24 g, PRN  HYDROcodone-acetaminophen, 1 tablet, Q6H PRN  insulin aspart U-100, 0-10 Units, QID (AC + HS) PRN  melatonin, 6 mg, Nightly PRN  metoprolol, 5 mg, Q6H PRN  naloxone, 0.02 mg, PRN  ondansetron, 8 mg, Q8H PRN  ondansetron, 4 mg, Q8H PRN  polyethylene glycol, 17 g, Daily PRN  senna-docusate 8.6-50 mg, 2 tablet, Daily PRN    Calorie Containing IV Medications: no significant kcals from medications at this time    Recent Labs   Lab 01/16/25  0445 01/17/25  0659 01/17/25  0700 01/17/25  1207 01/18/25  0526 01/19/25  0611 01/20/25  0458 01/21/25  0547 01/22/25  0614    144  --  142 142 141 139  139 137 136   K 5.0 4.8  --  4.5 4.0 4.1 4.4  4.4 4.4 4.3   CALCIUM 9.2 9.6  --  9.6 9.6 9.7 9.7  9.7 9.6 9.6   PHOS 4.5 4.5  --   --   --   --  5.7*  --  4.6*   * 114*  --  111* 111* 109 108  108 105 106   CO2 20* 20*  --  21* 21* 18* 17*  17* 19* 19*   BUN 58* 45*  --  43* 41* 43* 50*  50* 52* 56*   CREATININE 2.9* 2.3*  --  2.2* 2.1* 2.3* 2.5*  2.5* 2.4* 2.2*   EGFRNORACEVR 22* 30*  --  31* 33* 30* 27*  27* 28* 31*   ALBUMIN 1.4* 1.4*  --   --   --   --  1.2*  --  1.3*   WBC 2.78*  --  2.85* 3.03* 2.85* 3.21* 2.94*  2.94*  --   --   "  HGB 7.7*  --  8.8* 8.7* 8.4* 8.6* 8.3*  8.3*  --   --    HCT 26.5*  --  29.9* 29.1* 28.7* 29.8* 29.1*  29.1*  --   --      Nutrition Orders:  Diet Renal Minced & Moist (IDDSI Level 5), Renal; Standard Tray      Appetite/Oral Intake: poor/0-25% of meals  Factors Affecting Nutritional Intake:  unable to obtain  Social Needs Impacting Access to Food: unable to assess at this time; will attempt on follow-up  Food/Orthodox/Cultural Preferences: none reported  Food Allergies: no known food allergies  Last Bowel Movement: 01/18/25  Wound(s):     Wound 01/13/25 1040 Venous Ulcer Right lower;lateral Leg-Tissue loss description: Partial thickness       Wound 01/12/25 1713 Moisture associated dermatitis Right Gluteal cleft-Tissue loss description: Partial thickness       Altered Skin Integrity 12/01/23 0948 Left anterior;lower Leg-Tissue loss description: Partial thickness     Comments    1/22/25: Dietitian working remotely. Attempted to reach pt by phone without success. Currently on Renal, minced and moist diet with intake 0-25% of most meals since admit per flowsheets. 2-13 lb weight loss PTA reported on MST. Of note, was on Ozempic per home med list. Last documented BM 1/18/25 - has PRN bowel regimen ordered but has not received any doses per MAR.     Anthropometrics    Height: 5' 7" (170.2 cm), Height Method: Stated  Last Weight: 130.2 kg (287 lb 0.6 oz) (01/15/25 2335), Weight Method: Bed Scale  BMI (Calculated): 44.9  BMI Classification: obese grade III (BMI >/=40)        Ideal Body Weight (IBW), Male: 148 lb                                Usual Weight Provided By: EMR weight history    Wt Readings from Last 5 Encounters:   01/15/25 130.2 kg (287 lb 0.6 oz)   12/30/24 120.7 kg (265 lb 15.8 oz)   12/20/24 123 kg (271 lb 2.7 oz)   12/10/24 122 kg (268 lb 15.4 oz)   11/26/24 120.7 kg (266 lb)     Weight Change(s) Since Admission:   Wt Readings from Last 1 Encounters:   01/15/25 2335 130.2 kg (287 lb 0.6 oz)   01/14/25 " 0442 128.7 kg (283 lb 11.7 oz)   01/12/25 1713 126 kg (277 lb 12.5 oz)   01/12/25 0619 130.1 kg (286 lb 13.1 oz)   Admit Weight: 130.1 kg (286 lb 13.1 oz) (01/12/25 0619), Weight Method: Bed Scale    Estimated Needs    Weight Used For Calorie Calculations: 67.1 kg (148 lb) (using IBW d/t edema)  Energy Calorie Requirements (kcal): 5864-0784 kcal (25-30 kcal/kg)  Energy Need Method: Kcal/kg  Weight Used For Protein Calculations: 67.1 kg (148 lb) (using IBW d/t edema)  Protein Requirements: 53-67 g (0.8-1.0 g/kg IBW)  Fluid Requirements (mL): 0651-8647 mL or per MD  CHO Requirement: 209-251 g (50% estimated needs)     Enteral Nutrition     Patient not receiving enteral nutrition at this time.    Parenteral Nutrition     Patient not receiving parenteral nutrition support at this time.    Evaluation of Received Nutrient Intake    Calories: not meeting estimated needs  Protein: not meeting estimated needs    Patient Education     Not applicable.    Nutrition Diagnosis     PES: Inadequate energy intake related to inability to consume sufficient nutrients as evidenced by % intake documented. (new)       Nutrition Interventions     Intervention(s): modified composition of meals/snacks, commercial beverage, and collaboration with other providers    Goal: Consume % of meals/snacks by follow-up. (new)  Goal: Consume % of oral supplements by follow-up. (new)    Nutrition Goals & Monitoring     Dietitian will monitor: energy intake, weight, electrolyte/renal panel, glucose/endocrine profile, and gastrointestinal profile  Discharge planning:  Renal, diabetic diet with texture modifications per SLP with Suplena supplements PRN  Nutrition Risk/Follow-Up: high (follow-up in 1-4 days)   Please consult if re-assessment needed sooner.

## 2025-01-22 NOTE — ASSESSMENT & PLAN NOTE
Stable, has Purewick catheter now  Getting IVF, if gets worse, may need CBI    Still persists but a little better  If no improvement tomorrow- will try CBI    Improving with good Urine output, CBI not needed  Cont close monitoring      Improving, cont IVF    1/20/25  Improved  IVFs discontinued    1/21/25  Microscopic urine >100  Add high risk urine culture  Empirically place on Rocephin  Consider Urology consultation    1/22/25  Urine culture pending

## 2025-01-22 NOTE — PROGRESS NOTES
[facility] Medicine  Progress Note    Patient Name: Mitch Whittaker  MRN: [id number]  Patient Class: IP- Inpatient   Admission Date: 1/12/2025  Length of Stay: 10 days  Attending Physician: Carlos Mathew MD  Primary Care Provider: Valery Caal MD    Subjective     Principal Problem:Physical deconditioning    HPI:  Mitch Whittaker is a 69 y.o. male patient with a PMHx of CHF- EF 40%, anemia, hyperparathyroidism, type 2 DM, s/p kidney transplant 2015 on Prograf and Cellcept, DVT on Eliquis, MARION, HLD, HTN, CKD, Hep C, and arthritis who presents to the Emergency Department for evaluation of nausea vomiting and diarrhea over the past several days (both of which have improved), but felt weak and couldn't move around like normal. no abd pain, no systemic symptoms, stool now formed. Pt is a very poor historian. According to nursing staff, family reported that the pt has been sitting in his chair for the last 3 days. Pt reports that he has had two episodes of diarrhea since yesterday, but due to the increasing weakness in his knees he was unable to get up from his chair. Pt normally ambulates with assistance from a walker. Symptoms are constant and moderate in severity. Associated sxs include blood in stool (x4 days) and n/v yesterday, but none today after PO. Patient denies any fever, abdominal pain, appetite changes, SOB, dysuria, and all other sxs at this time. No prior tx. Pt is on Eliquis. No further complaints or concerns at this time.   In the ER, VS /65, , Sats 100% on RA, Afeb, WBC 3.7, H/H 9.5/33, Bun/Cr 82/4.3, K 6.7- treated aggressively in the ER and rechecked and repeat 5.4. He is heme positive as well. C Diff Neg. She is being admitted for possible Hyperkalemia, acute GI Bleed, ANGELITO.     Overview/Hospital Course:  69 y.o. male patient with a PMHx of CHF- EF 40%, anemia, hyperparathyroidism, type 2 DM, s/p kidney transplant 2015 on Prograf and Cellcept, DVT on  Eliquis, MARION, HLD, HTN, CKD, Hep C, and arthritis who presents to the Emergency Department for evaluation of nausea vomiting and diarrhea over the past several days (both of which have improved), but felt weak and couldn't move around like normal. no abd pain, no systemic symptoms, stool now formed. Pt is a very poor historian. According to nursing staff, family reported that the pt has been sitting in his chair for the last 3 days. Pt reports that he has had two episodes of diarrhea since yesterday, but due to the increasing weakness in his knees he was unable to get up from his chair. Pt normally ambulates with assistance from a walker. Symptoms are constant and moderate in severity. Associated sxs include blood in stool (x4 days) and n/v yesterday, but none today after PO. Patient denies any fever, abdominal pain, appetite changes, SOB, dysuria, and all other sxs at this time. No prior tx. Pt is on Eliquis. No further complaints or concerns at this time.   In the ER, VS /65, , Sats 100% on RA, Afeb, WBC 3.7, H/H 9.5/33, Bun/Cr 82/4.3, K 6.7- treated aggressively in the ER and rechecked and repeat 5.4. He is heme positive as well. C Diff Neg. She is being admitted for possible Hyperkalemia, acute GI Bleed, ANGELITO.     1/13- Appreciate Renal and GI input- pt looks and feels a little better, stronger, more alert and responsive. Wife and daughter are here. His prograf level very high- 32- hence Held. It seems that he was getting Prograf toxicity at home which then resulted in Hematuria, ABL Anemia, ANGELITO and Hyperkalemia. Pt also felt weak and low sugars, so drank a lot of OJ which further raised his K level. Does not appears to have true GI bleed. But has hematuria- hence Purewick catheter placed and Dr. Bennett also started him on IVF- hematuria appears to be clearing. Pt feeling better, will check labs in am and start PT/OT. K was 6.1 this am- started on lokelma.     1/14- looks a little better, no melena  but still has hematuria. H/h stable, 8.3/28, K still 6, Bun.Cr still high 83/4. Repeat Prograf level pending. VSS Afeb, he is more alert and talking. Wound care wrapped his legs with moderate compression. Had mod R SFA stenosis, vascular evaluated him and will continue to monitor him, no surgery or angioplasty needed at this time. Will start PT/OT in am. Cont IVF, If Hematuria persists, start CBI in am.     1/15- looks better but c/o pain in his legs which are in a compression socks. Good response to iVF, Hematuria getting better and Cr down to 3 now with good urine output. H/H dropped to 7.2/22 sec to IVF and Hematuria. Still await repeat prograf level. Getting PT/OT, started on Norco for pain control.      1/16- resting quietly, comfortable, making good amount of urine, hematuria almost cleared with IVF. Bun/Cr down to 58/2.9. K normal, lokelm d/koki. H/H improved to 7.7/26. Prograf noiw 5.2, will restart at 3 mg bid. Stop hydration in am, start PT/OT. D/c home soon.     01/17/2025  Patient requiring max assists with bed transitions and feeding. High intensity therapy recommended by therapists. Patient previously independent, living at home. Referrals for rehab placed at this time.      01/18/2025  Some notable objective clinical improvement. Reports decreased ROM in UE bilaterally but predominantly his right UE. Radiography of the left hand and R shoulder unremarkable. Will obtain CT cervical spine to further assess for stenosis.     01/19/2025  Cervical spine CT shows right moderate foraminal stenosis at C3-4 and C5-6 secondary to spurring of the uncovertebral joints.  Will obtain MRI of spine and consult orthospine to assess to see if findings could be contributing to his significant UE weakness and pain.     1/20/2025:   MRI Cervical/Thoracic/Lumbar showed multilevel spondylosis mild to moderate but no acute findings. Patient's daughter tells me patient independently prior to hospitalization. Patient complains  of upper and lower bilateral weakness. While Prograf toxicity could play a part in progressive decline, he will need additional work up. CT head unremarkable. Kidney function has returned to baseline.  Vitamin B12 pending. Neurosurgery/Neurology consult pending. He is awaiting rehab vs SNF placement.     1/21/2025  MRI brain shows atrophy and microvascular changes but no acute findings. His progressive weakness was evaluated by Neurology who recommended obtaining thiamine, copper, vitamin E, and vitamin D level. Continues to have hematuria on UA. Order high risk urine culture and empirically start Rocephin. Will consider Urology consult pending hospital course.    1/22/2025  Kidney function stable. Hypothermic overnight, improved with heating blanket. Thyroid studies and infectious work up unremarkable. Suspect hypothermia could be related to chilled room condition. Plans for SNF.     Interval History: hypothermia that improved with warming blanket. No other complaints. Assisted with facial hygiene.    Review of Systems   Constitutional:  Positive for activity change and appetite change. Negative for fever.   HENT:  Negative for hearing loss.    Eyes:  Negative for visual disturbance.   Respiratory:  Negative for cough and shortness of breath.    Cardiovascular:  Positive for leg swelling. Negative for chest pain and palpitations.   Genitourinary:  Negative for difficulty urinating, dysuria, frequency and hematuria.   Musculoskeletal:  Negative for arthralgias and back pain.        Decrease ROM to lower extremities   Skin:  Positive for wound. Negative for color change and pallor.   Neurological:  Positive for weakness. Negative for seizures, syncope, numbness and headaches.   Hematological:  Does not bruise/bleed easily.   Psychiatric/Behavioral:  The patient is not nervous/anxious.      Objective:     Vital Signs (Most Recent):  Temp: 96.9 °F (36.1 °C) (01/22/25 1243)  Pulse: 86 (01/22/25 1309)  Resp: 16 (01/22/25  1243)  BP: (!) 95/53 (01/22/25 1243)  SpO2: (!) 92 % (01/22/25 1243) Vital Signs (24h Range):  Temp:  [94.9 °F (34.9 °C)-97.2 °F (36.2 °C)] 96.9 °F (36.1 °C)  Pulse:  [73-89] 86  Resp:  [16-24] 16  SpO2:  [92 %-98 %] 92 %  BP: ()/(51-62) 95/53     Weight: 130.2 kg (287 lb 0.6 oz)  Body mass index is 44.96 kg/m².    Intake/Output Summary (Last 24 hours) at 1/22/2025 1400  Last data filed at 1/22/2025 1339  Gross per 24 hour   Intake 470 ml   Output 1300 ml   Net -830 ml         Physical Exam  Constitutional:       General: He is not in acute distress.     Appearance: Normal appearance. He is well-developed. He is obese. He is ill-appearing. He is not toxic-appearing or diaphoretic.   HENT:      Head: Normocephalic and atraumatic.   Eyes:      Extraocular Movements: Extraocular movements intact.   Cardiovascular:      Rate and Rhythm: Normal rate and regular rhythm.      Heart sounds: Normal heart sounds. No murmur heard.  Pulmonary:      Effort: Pulmonary effort is normal. No respiratory distress.      Breath sounds: Normal breath sounds. No wheezing.   Abdominal:      General: Bowel sounds are normal. There is no distension.      Palpations: Abdomen is soft. There is no mass.      Tenderness: There is no abdominal tenderness.   Musculoskeletal:         General: Swelling present.      Cervical back: Normal range of motion and neck supple.      Right lower leg: Edema present.      Left lower leg: Edema present.   Skin:     General: Skin is warm and dry.      Capillary Refill: Capillary refill takes 2 to 3 seconds.   Neurological:      General: No focal deficit present.      Mental Status: He is alert and oriented to person, place, and time.      Cranial Nerves: No cranial nerve deficit.      Motor: Weakness present.   Psychiatric:         Mood and Affect: Mood normal.         Behavior: Behavior normal.         Thought Content: Thought content normal.         Judgment: Judgment normal.           Significant Labs:  "All pertinent labs within the past 24 hours have been reviewed.  CBC: No results for input(s): "WBC", "HGB", "HCT", "PLT" in the last 48 hours.  CMP:   Recent Labs   Lab 01/21/25  0547 01/22/25  0614    136   K 4.4 4.3    106   CO2 19* 19*   * 196*   BUN 52* 56*   CREATININE 2.4* 2.2*   CALCIUM 9.6 9.6   ALBUMIN  --  1.3*   ANIONGAP 13 11       Significant Imaging: I have reviewed all pertinent imaging results/findings within the past 24 hours.    Assessment and Plan     * Severe physical deconditioning  01/17/2025  Patient requiring max assists with bed transitions and feeding. High intensity therapy recommended by therapists. Patient previously independent, living at home. Referrals for rehab placed at this time.      01/18/2025  Some notable objective clinical improvement. Reports decreased ROM in UE bilaterally but predominantly his right UE. Radiography of the left hand and R shoulder unremarkable. Will obtain CT cervical spine to further assess for stenosis.     01/19/2025  Cervical spine CT shows right moderate foraminal stenosis at C3-4 and C5-6 secondary to spurring of the uncovertebral joints.  Will obtain MRI of spine and consult orthospine to assess to see if findings could be contributing to his significant UE weakness and pain.     1/20/2025  Patient's daughter tells me patient independently prior to hospitalization. Patient complains of upper and lower bilateral weakness. While Prograf toxicity could play a part in progressive decline, he will need additional work up. CT head unremarkable. Kidney function has returned to baseline.  Vitamin B12 pending. Neurosurgery/Neurology consult pending. He is awaiting rehab vs SNF placement.     1/21/2025  Neurology input appreciated  Follow Vitamin B12, Thiamine, Copper  Continue PT/OT  Awaiting SNF consultation    Tacrolimus-induced GI toxicity  Prograf level very high  Prograf on hold    Await repeat Prograf level    Repeat levels normal- " will restart Prograf from am at 3 mg bid      Gross hematuria  Stable, has Purewick catheter now  Getting IVF, if gets worse, may need CBI    Still persists but a little better  If no improvement tomorrow- will try CBI    Improving with good Urine output, CBI not needed  Cont close monitoring      Improving, cont IVF    1/20/25  Improved  IVFs discontinued    1/21/25  Microscopic urine >100  Add high risk urine culture  Empirically place on Rocephin  Consider Urology consultation    1/22/25  Urine culture pending    CKD (chronic kidney disease) stage 4, GFR 15-29 ml/min  Creatine stable for now. BMP reviewed- noted Estimated Creatinine Clearance: 36.6 mL/min (A) (based on SCr of 2.4 mg/dL (H)). according to latest data. Based on current GFR, CKD stage is stage 4 - GFR 15-29.  Monitor UOP and serial BMP and adjust therapy as needed. Renally dose meds. Avoid nephrotoxic medications and procedures.  Pt is d/p Renal Transplant in 2015, on Prograf and Cellcept    Edwina on CKD 4- sec to Prograf toxicity- Bleeding- started on IVF  Cont IVF  Improving with IVF, Cr coming down to 3    01/21/2025  Stable  Immunosuppressant therapy restarted    ABL Anemia plus anemia of CKD 4      Anemia is likely due to acute blood loss which was from CKD, s/p Renal transplant and chronic disease due to Chronic Kidney Disease. Most recent hemoglobin and hematocrit are listed below.  Recent Labs     01/20/25  0458   HGB 8.3*  8.3*   HCT 29.1*  29.1*       01/22/2025  stable      VTE Risk Mitigation (From admission, onward)           Ordered     Reason for No Pharmacological VTE Prophylaxis  Once        Question:  Reasons:  Answer:  Active Bleeding    01/12/25 1729     IP VTE HIGH RISK PATIENT  Once         01/12/25 1729     Place sequential compression device  Until discontinued         01/12/25 1729                    Discharge Planning   GRETEL:      Code Status: Full Code   Medical Readiness for Discharge Date:   Discharge Plan A: Other  (Pending Progression)              Darya Maravilla NP  Department of Hospital Medicine   [facility] - Telemetry ([facility])

## 2025-01-22 NOTE — ASSESSMENT & PLAN NOTE
Anemia is likely due to acute blood loss which was from CKD, s/p Renal transplant and chronic disease due to Chronic Kidney Disease. Most recent hemoglobin and hematocrit are listed below.  Recent Labs     01/20/25  0458   HGB 8.3*  8.3*   HCT 29.1*  29.1*       01/22/2025  stable

## 2025-01-22 NOTE — PLAN OF CARE
Discussed poc with pt, pt verbalized understanding    Purposeful rounding every 2hours    VS wnl  Cardiac monitoring in use, pt is NSR, tele monitor # [id number]  Fall precautions in place, remains injury free  Pain and nausea under control with PRN meds    Accurate I&Os  Abx given as prescribed  Bed locked at lowest position  Call light within reach    Chart check complete  Will cont with POC

## 2025-01-22 NOTE — PROGRESS NOTES
Nephrology Progress Note     History of Present Illness     Mr. Whittaker is a 69 year  male with a hx of previous ESRD likely related to diabetes and hypertension that was on dialysis several years prior to receiving a living related kidney transplant from his daughter in 2015.  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  He is followed by Dr. Gonsalez of [facility] seen last on 09/24/2024 at which time his serum creatinine was 2.2.     He was seen in the emergency department on 01/06/2025 complaining of increasing lower extremity edema.  At that time his serum creatinine was 2.8.  He returns to the emergency department 01/12/2025 with persistent lower extremity edema and associated blistering.  He also complains of bilateral knee pain to the point where he is unable to ambulate.  He attributes this to the change in weather.  His admission laboratory reveals a serum creatinine now up to 4.3 with a potassium of 5.4.  Nephrology has been asked to see and evaluate the patient.     As an outpatient he is chronically on Lasix 40 mg twice a day.  He denies the use of nonsteroidal anti-inflammatory drugs.  He denies dysuria hematuria or difficulty voiding.  He is chronically on Entresto.      Interval History   Overnight/currently:  Hypothermic overnight.  Patient for his port feels well and denies any chest pain, shortness of breath or nausea/vomiting.  Creatinine is back down to 2.2 which is well within his baseline.  Evidently urine culture obtained.  No blood cultures drawn.        Allergies:    is allergic to lisinopril, actos  [pioglitazone], and metformin.    Current medications:   Scheduled Meds:   cefTRIAXone (Rocephin) IV (PEDS and ADULTS)  1 g Intravenous Q24H     "gabapentin  300 mg Oral TID    insulin glargine U-100  10 Units Subcutaneous BID    LIDOcaine  2 patch Transdermal Q24H    metoprolol succinate  25 mg Oral Daily    mupirocin   Nasal BID    mycophenolate  500 mg Oral BID    nystatin  500,000 Units Oral QID    pantoprazole  40 mg Oral BID    predniSONE  5 mg Oral Daily    sodium chloride 0.9%  10 mL Intravenous Q8H    tacrolimus  3 mg Oral BID    tamsulosin  0.4 mg Oral QHS     Continuous Infusions:  PRN Meds:.  Current Facility-Administered Medications:     0.9%  NaCl infusion (for blood administration), , Intravenous, Q24H PRN    acetaminophen, 650 mg, Oral, Q4H PRN    dextrose 50%, 12.5 g, Intravenous, PRN    dextrose 50%, 25 g, Intravenous, PRN    glucagon (human recombinant), 1 mg, Intramuscular, PRN    glucose, 16 g, Oral, PRN    glucose, 24 g, Oral, PRN    HYDROcodone-acetaminophen, 1 tablet, Oral, Q6H PRN    insulin aspart U-100, 0-10 Units, Subcutaneous, QID (AC + HS) PRN    melatonin, 6 mg, Oral, Nightly PRN    metoprolol, 5 mg, Intravenous, Q6H PRN    naloxone, 0.02 mg, Intravenous, PRN    ondansetron, 8 mg, Oral, Q8H PRN    ondansetron, 4 mg, Intravenous, Q8H PRN    polyethylene glycol, 17 g, Oral, Daily PRN    senna-docusate 8.6-50 mg, 2 tablet, Oral, Daily PRN     Physical Examination     VS/Measurements    BP (!) 109/51 (BP Location: Left arm, Patient Position: Lying)   Pulse 89   Temp (!) 95.9 °F (35.5 °C) (Axillary)   Resp 18   Ht 5' 7" (1.702 m)   Wt 130.2 kg (287 lb 0.6 oz)   SpO2 98%   BMI 44.96 kg/m²         General:  Moderately built, not in any distress.  Neck:  Supple,   Respiratory: Non-labored,  Lungs are clear to auscultation.    Cardiovascular:  Normal rate, Regular rhythm.   Abdomen:  Soft, Non-tender, Normal bowel sounds.   Muskuloskeletal:  + pedal edema          Laboratory Results   Today's Lab Results :    Recent Results (from the past 24 hours)   POCT glucose    Collection Time: 01/21/25 11:23 AM   Result Value Ref Range    " POCT Glucose 237 (H) 70 - 110 mg/dL   POCT glucose    Collection Time: 01/21/25  4:41 PM   Result Value Ref Range    POCT Glucose 208 (H) 70 - 110 mg/dL   POCT glucose    Collection Time: 01/21/25  7:48 PM   Result Value Ref Range    POCT Glucose 199 (H) 70 - 110 mg/dL   POCT glucose    Collection Time: 01/22/25  5:30 AM   Result Value Ref Range    POCT Glucose 201 (H) 70 - 110 mg/dL   Renal Function Panel    Collection Time: 01/22/25  6:14 AM   Result Value Ref Range    Glucose 196 (H) 70 - 110 mg/dL    Sodium 136 136 - 145 mmol/L    Potassium 4.3 3.5 - 5.1 mmol/L    Chloride 106 95 - 110 mmol/L    CO2 19 (L) 23 - 29 mmol/L    BUN 56 (H) 8 - 23 mg/dL    Calcium 9.6 8.7 - 10.5 mg/dL    Creatinine 2.2 (H) 0.5 - 1.4 mg/dL    Albumin 1.3 (L) 3.5 - 5.2 g/dL    Phosphorus 4.6 (H) 2.7 - 4.5 mg/dL    eGFR 31 (A) >60 mL/min/1.73 m^2    Anion Gap 11 8 - 16 mmol/L       LABS:  Reviewed Yes      Intake/Output Summary (Last 24 hours) at 1/22/2025 0917  Last data filed at 1/22/2025 0851  Gross per 24 hour   Intake 370 ml   Output 1300 ml   Net -930 ml       Assessment and Plan     ANGELITO/CKD stage 4 in his transplant kidney.  [place] to be secondary to possible intravascular volume depletion complicated by Prograf toxicity.  Creatinine back within baseline.  Stable this morning.     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  Chronic allograft nephropathy.  Followed by Dr. Gonsalez.  As above.  Continue immunosuppression with CellCept, Prograf and prednisone.  Prograf trough level 1/20 likely drawn a bit early and not accurate.  Given his time out from transplant and the fact that he has a living related donor kidney transplant, a Prograf trough level of 4-6 would be adequate.  Repeat trough in the morning.    Hypothermia.  Check blood cultures x2.  Also has an indwelling Ann.  Evidently this was replaced on the 20th due to retention.  I will place him on some tamsulosin today and we will try a voiding trial in the morning.  Given his  hypothermia, I am concerned that this puts him at greater risk for infection and would like to get that out.      Gross hematuria.  Likely Ann trauma.  Anticoagulants on hold.  This appears to be clear at this time.     Hyperkalemia.  This has resolved with Lokelma as well as holding his Entresto and potassium supplements.     Hypercalcemia.  Corrected calcium 11.76.  Serum and urine protein electrophoresis pending.  He has been on calcitriol outpatient which is being held here.  Avoid calcium and vitamin-D.  Question partly due to immobilization.     Melena.  Resolved.  GI following.       Anemia.  Hematuria versus possible GI bleed while on Eliquis and aspirin.  Status post 1 unit of PRBCs and hemoglobin relatively stable at this point.     Hypertension CKD.  Blood pressure at goal.       HFrEF.  Currently compensated.  EF 35% 11/2024.  Followed by Dr. Man with Cardiology.     Diabetes mellitus type 2.  Defer to Hospital Medicine.        ________________________________________________  Elias Watkins

## 2025-01-22 NOTE — PLAN OF CARE
Nutrition Recommendations/Interventions on 1/22/25:   Consider liberalizing to diabetic diet d/t poor intake at meals. Encourage intake.  Will add Suplena supplements for additional kcal. Encourage intake.  Weigh patient/update weight recording.  Last documented BM 1/18/25. Consider scheduled bowel regimen if accurate.   Monitor % intake meals + supplements, weight, I/O, labs/BG trends.    Goals:   Goal: Consume % of meals/snacks by follow-up. (new)  Goal: Consume % of oral supplements by follow-up. (New)    Thuy Faust, RD, LDN, CNSC

## 2025-01-22 NOTE — PLAN OF CARE
Problem: Adult Inpatient Plan of Care  Goal: Plan of Care Review  Outcome: Progressing  Goal: Patient-Specific Goal (Individualized)  Outcome: Progressing  Goal: Absence of Hospital-Acquired Illness or Injury  Outcome: Progressing  Goal: Optimal Comfort and Wellbeing  Outcome: Progressing  Goal: Readiness for Transition of Care  Outcome: Progressing     Problem: Bariatric Environmental Safety  Goal: Safety Maintained with Care  Outcome: Progressing     Problem: Diabetes Comorbidity  Goal: Blood Glucose Level Within Targeted Range  Outcome: Progressing     Problem: Wound  Goal: Optimal Coping  Outcome: Progressing  Goal: Optimal Functional Ability  Outcome: Progressing  Goal: Absence of Infection Signs and Symptoms  Outcome: Progressing  Goal: Improved Oral Intake  Outcome: Progressing  Goal: Optimal Pain Control and Function  Outcome: Progressing  Goal: Skin Health and Integrity  Outcome: Progressing  Goal: Optimal Wound Healing  Outcome: Progressing     Problem: Skin Injury Risk Increased  Goal: Skin Health and Integrity  Outcome: Progressing     Problem: Pain Acute  Goal: Optimal Pain Control and Function  Outcome: Progressing     Problem: Infection  Goal: Absence of Infection Signs and Symptoms  Outcome: Progressing   38sec run of vtach throughout the night. Pt asymptomatic. MD notified. Turned q2h.

## 2025-01-22 NOTE — SUBJECTIVE & OBJECTIVE
Interval History: hypothermia that improved with warming blanket. No other complaints. Assisted with facial hygiene.    Review of Systems   Constitutional:  Positive for activity change and appetite change. Negative for fever.   HENT:  Negative for hearing loss.    Eyes:  Negative for visual disturbance.   Respiratory:  Negative for cough and shortness of breath.    Cardiovascular:  Positive for leg swelling. Negative for chest pain and palpitations.   Genitourinary:  Negative for difficulty urinating, dysuria, frequency and hematuria.   Musculoskeletal:  Negative for arthralgias and back pain.        Decrease ROM to lower extremities   Skin:  Positive for wound. Negative for color change and pallor.   Neurological:  Positive for weakness. Negative for seizures, syncope, numbness and headaches.   Hematological:  Does not bruise/bleed easily.   Psychiatric/Behavioral:  The patient is not nervous/anxious.      Objective:     Vital Signs (Most Recent):  Temp: 96.9 °F (36.1 °C) (01/22/25 1243)  Pulse: 86 (01/22/25 1309)  Resp: 16 (01/22/25 1243)  BP: (!) 95/53 (01/22/25 1243)  SpO2: (!) 92 % (01/22/25 1243) Vital Signs (24h Range):  Temp:  [94.9 °F (34.9 °C)-97.2 °F (36.2 °C)] 96.9 °F (36.1 °C)  Pulse:  [73-89] 86  Resp:  [16-24] 16  SpO2:  [92 %-98 %] 92 %  BP: ()/(51-62) 95/53     Weight: 130.2 kg (287 lb 0.6 oz)  Body mass index is 44.96 kg/m².    Intake/Output Summary (Last 24 hours) at 1/22/2025 1400  Last data filed at 1/22/2025 1339  Gross per 24 hour   Intake 470 ml   Output 1300 ml   Net -830 ml         Physical Exam  Constitutional:       General: He is not in acute distress.     Appearance: Normal appearance. He is well-developed. He is obese. He is ill-appearing. He is not toxic-appearing or diaphoretic.   HENT:      Head: Normocephalic and atraumatic.   Eyes:      Extraocular Movements: Extraocular movements intact.   Cardiovascular:      Rate and Rhythm: Normal rate and regular rhythm.      Heart  "sounds: Normal heart sounds. No murmur heard.  Pulmonary:      Effort: Pulmonary effort is normal. No respiratory distress.      Breath sounds: Normal breath sounds. No wheezing.   Abdominal:      General: Bowel sounds are normal. There is no distension.      Palpations: Abdomen is soft. There is no mass.      Tenderness: There is no abdominal tenderness.   Musculoskeletal:         General: Swelling present.      Cervical back: Normal range of motion and neck supple.      Right lower leg: Edema present.      Left lower leg: Edema present.   Skin:     General: Skin is warm and dry.      Capillary Refill: Capillary refill takes 2 to 3 seconds.   Neurological:      General: No focal deficit present.      Mental Status: He is alert and oriented to person, place, and time.      Cranial Nerves: No cranial nerve deficit.      Motor: Weakness present.   Psychiatric:         Mood and Affect: Mood normal.         Behavior: Behavior normal.         Thought Content: Thought content normal.         Judgment: Judgment normal.           Significant Labs: All pertinent labs within the past 24 hours have been reviewed.  CBC: No results for input(s): "WBC", "HGB", "HCT", "PLT" in the last 48 hours.  CMP:   Recent Labs   Lab 01/21/25  0547 01/22/25  0614    136   K 4.4 4.3    106   CO2 19* 19*   * 196*   BUN 52* 56*   CREATININE 2.4* 2.2*   CALCIUM 9.6 9.6   ALBUMIN  --  1.3*   ANIONGAP 13 11       Significant Imaging: I have reviewed all pertinent imaging results/findings within the past 24 hours.  "

## 2025-01-23 PROBLEM — M25.561 RIGHT KNEE PAIN: Status: ACTIVE | Noted: 2025-01-23

## 2025-01-23 PROBLEM — R33.9 URINARY RETENTION: Status: ACTIVE | Noted: 2025-01-23

## 2025-01-23 LAB
ALBUMIN SERPL BCP-MCNC: 1.3 G/DL (ref 3.5–5.2)
ALP SERPL-CCNC: 160 U/L (ref 40–150)
ALT SERPL W/O P-5'-P-CCNC: 16 U/L (ref 10–44)
ANION GAP SERPL CALC-SCNC: 11 MMOL/L (ref 8–16)
APPEARANCE FLD: NORMAL
AST SERPL-CCNC: 16 U/L (ref 10–40)
BASOPHILS NFR BLD: 0 % (ref 0–1.9)
BILIRUB SERPL-MCNC: 0.4 MG/DL (ref 0.1–1)
BODY FLD TYPE: NORMAL
BUN SERPL-MCNC: 55 MG/DL (ref 8–23)
CALCIUM SERPL-MCNC: 9.4 MG/DL (ref 8.7–10.5)
CHLORIDE SERPL-SCNC: 106 MMOL/L (ref 95–110)
CO2 SERPL-SCNC: 19 MMOL/L (ref 23–29)
COLOR FLD: NORMAL
CREAT SERPL-MCNC: 2.1 MG/DL (ref 0.5–1.4)
DIFFERENTIAL METHOD BLD: ABNORMAL
EOSINOPHIL NFR BLD: 0 % (ref 0–8)
ERYTHROCYTE [DISTWIDTH] IN BLOOD BY AUTOMATED COUNT: 14.3 % (ref 11.5–14.5)
EST. GFR  (NO RACE VARIABLE): 33 ML/MIN/1.73 M^2
GLUCOSE SERPL-MCNC: 190 MG/DL (ref 70–110)
HCT VFR BLD AUTO: 28.2 % (ref 40–54)
HGB BLD-MCNC: 8.2 G/DL (ref 14–18)
IMM GRANULOCYTES # BLD AUTO: ABNORMAL K/UL (ref 0–0.04)
IMM GRANULOCYTES NFR BLD AUTO: ABNORMAL % (ref 0–0.5)
LYMPHOCYTES NFR BLD: 22 % (ref 18–48)
LYMPHOCYTES NFR FLD MANUAL: 8 %
MAGNESIUM SERPL-MCNC: 1.9 MG/DL (ref 1.6–2.6)
MCH RBC QN AUTO: 24.3 PG (ref 27–31)
MCHC RBC AUTO-ENTMCNC: 29.1 G/DL (ref 32–36)
MCV RBC AUTO: 84 FL (ref 82–98)
METAMYELOCYTES NFR BLD MANUAL: 2 %
MONOCYTES NFR BLD: 16 % (ref 4–15)
MONOS+MACROS NFR FLD MANUAL: 6 %
MYELOCYTES NFR BLD MANUAL: 1 %
NEUTROPHILS NFR BLD: 58 % (ref 38–73)
NEUTROPHILS NFR FLD MANUAL: 86 %
NEUTS BAND NFR BLD MANUAL: 1 %
NRBC BLD-RTO: 1 /100 WBC
OVALOCYTES BLD QL SMEAR: ABNORMAL
PLATELET # BLD AUTO: 368 K/UL (ref 150–450)
PLATELET BLD QL SMEAR: ABNORMAL
PMV BLD AUTO: 10.7 FL (ref 9.2–12.9)
POCT GLUCOSE: 196 MG/DL (ref 70–110)
POCT GLUCOSE: 232 MG/DL (ref 70–110)
POCT GLUCOSE: 244 MG/DL (ref 70–110)
POCT GLUCOSE: 285 MG/DL (ref 70–110)
POTASSIUM SERPL-SCNC: 4.4 MMOL/L (ref 3.5–5.1)
PROCALCITONIN SERPL IA-MCNC: 0.65 NG/ML
PROT SERPL-MCNC: 4.7 G/DL (ref 6–8.4)
RBC # BLD AUTO: 3.37 M/UL (ref 4.6–6.2)
SODIUM SERPL-SCNC: 136 MMOL/L (ref 136–145)
TACROLIMUS BLD-MCNC: 5.8 NG/ML (ref 5–15)
WBC # BLD AUTO: 2.97 K/UL (ref 3.9–12.7)
WBC # FLD: 600 /CU MM

## 2025-01-23 PROCEDURE — 21400001 HC TELEMETRY ROOM

## 2025-01-23 PROCEDURE — 0S9C3ZZ DRAINAGE OF RIGHT KNEE JOINT, PERCUTANEOUS APPROACH: ICD-10-PCS | Performed by: ORTHOPAEDIC SURGERY

## 2025-01-23 PROCEDURE — 25000003 PHARM REV CODE 250: Performed by: EMERGENCY MEDICINE

## 2025-01-23 PROCEDURE — 80197 ASSAY OF TACROLIMUS: CPT | Performed by: HOSPITALIST

## 2025-01-23 PROCEDURE — 20610 DRAIN/INJ JOINT/BURSA W/O US: CPT | Mod: RT,,, | Performed by: ORTHOPAEDIC SURGERY

## 2025-01-23 PROCEDURE — 89051 BODY FLUID CELL COUNT: CPT | Performed by: ORTHOPAEDIC SURGERY

## 2025-01-23 PROCEDURE — 25000003 PHARM REV CODE 250: Performed by: PHYSICIAN ASSISTANT

## 2025-01-23 PROCEDURE — 36415 COLL VENOUS BLD VENIPUNCTURE: CPT | Performed by: INTERNAL MEDICINE

## 2025-01-23 PROCEDURE — 84145 PROCALCITONIN (PCT): CPT | Performed by: NURSE PRACTITIONER

## 2025-01-23 PROCEDURE — 63600175 PHARM REV CODE 636 W HCPCS: Performed by: NURSE PRACTITIONER

## 2025-01-23 PROCEDURE — 99232 SBSQ HOSP IP/OBS MODERATE 35: CPT | Mod: ,,, | Performed by: INTERNAL MEDICINE

## 2025-01-23 PROCEDURE — 89060 EXAM SYNOVIAL FLUID CRYSTALS: CPT | Performed by: ORTHOPAEDIC SURGERY

## 2025-01-23 PROCEDURE — 85007 BL SMEAR W/DIFF WBC COUNT: CPT | Performed by: NURSE PRACTITIONER

## 2025-01-23 PROCEDURE — A4216 STERILE WATER/SALINE, 10 ML: HCPCS | Performed by: EMERGENCY MEDICINE

## 2025-01-23 PROCEDURE — 97530 THERAPEUTIC ACTIVITIES: CPT

## 2025-01-23 PROCEDURE — 99222 1ST HOSP IP/OBS MODERATE 55: CPT | Mod: 25,,, | Performed by: ORTHOPAEDIC SURGERY

## 2025-01-23 PROCEDURE — 25000003 PHARM REV CODE 250: Performed by: INTERNAL MEDICINE

## 2025-01-23 PROCEDURE — 25000003 PHARM REV CODE 250: Performed by: HOSPITALIST

## 2025-01-23 PROCEDURE — 63600175 PHARM REV CODE 636 W HCPCS: Performed by: EMERGENCY MEDICINE

## 2025-01-23 PROCEDURE — 80053 COMPREHEN METABOLIC PANEL: CPT | Performed by: INTERNAL MEDICINE

## 2025-01-23 PROCEDURE — 63600175 PHARM REV CODE 636 W HCPCS: Performed by: STUDENT IN AN ORGANIZED HEALTH CARE EDUCATION/TRAINING PROGRAM

## 2025-01-23 PROCEDURE — 25000003 PHARM REV CODE 250: Performed by: STUDENT IN AN ORGANIZED HEALTH CARE EDUCATION/TRAINING PROGRAM

## 2025-01-23 PROCEDURE — 83735 ASSAY OF MAGNESIUM: CPT | Performed by: INTERNAL MEDICINE

## 2025-01-23 PROCEDURE — 36415 COLL VENOUS BLD VENIPUNCTURE: CPT | Performed by: NURSE PRACTITIONER

## 2025-01-23 PROCEDURE — 85027 COMPLETE CBC AUTOMATED: CPT | Performed by: NURSE PRACTITIONER

## 2025-01-23 PROCEDURE — 25000003 PHARM REV CODE 250: Performed by: NURSE PRACTITIONER

## 2025-01-23 RX ORDER — COLCHICINE 0.6 MG/1
0.6 TABLET, FILM COATED ORAL ONCE
Status: COMPLETED | OUTPATIENT
Start: 2025-01-23 | End: 2025-01-23

## 2025-01-23 RX ORDER — LIDOCAINE HYDROCHLORIDE 10 MG/ML
1 INJECTION, SOLUTION EPIDURAL; INFILTRATION; INTRACAUDAL; PERINEURAL ONCE
Status: COMPLETED | OUTPATIENT
Start: 2025-01-23 | End: 2025-01-23

## 2025-01-23 RX ADMIN — PANTOPRAZOLE SODIUM 40 MG: 40 TABLET, DELAYED RELEASE ORAL at 08:01

## 2025-01-23 RX ADMIN — INSULIN ASPART 4 UNITS: 100 INJECTION, SOLUTION INTRAVENOUS; SUBCUTANEOUS at 11:01

## 2025-01-23 RX ADMIN — PREDNISONE 5 MG: 5 TABLET ORAL at 08:01

## 2025-01-23 RX ADMIN — METOPROLOL SUCCINATE 25 MG: 25 TABLET, FILM COATED, EXTENDED RELEASE ORAL at 08:01

## 2025-01-23 RX ADMIN — GABAPENTIN 300 MG: 300 CAPSULE ORAL at 02:01

## 2025-01-23 RX ADMIN — CEFTRIAXONE 1 G: 1 INJECTION, POWDER, FOR SOLUTION INTRAMUSCULAR; INTRAVENOUS at 02:01

## 2025-01-23 RX ADMIN — INSULIN GLARGINE 10 UNITS: 100 INJECTION, SOLUTION SUBCUTANEOUS at 08:01

## 2025-01-23 RX ADMIN — TAMSULOSIN HYDROCHLORIDE 0.4 MG: 0.4 CAPSULE ORAL at 08:01

## 2025-01-23 RX ADMIN — NYSTATIN 500000 UNITS: 100000 SUSPENSION ORAL at 02:01

## 2025-01-23 RX ADMIN — TACROLIMUS 3 MG: 1 CAPSULE ORAL at 08:01

## 2025-01-23 RX ADMIN — GABAPENTIN 300 MG: 300 CAPSULE ORAL at 08:01

## 2025-01-23 RX ADMIN — INSULIN ASPART 2 UNITS: 100 INJECTION, SOLUTION INTRAVENOUS; SUBCUTANEOUS at 05:01

## 2025-01-23 RX ADMIN — MUPIROCIN: 20 OINTMENT TOPICAL at 09:01

## 2025-01-23 RX ADMIN — Medication 10 ML: at 10:01

## 2025-01-23 RX ADMIN — COLCHICINE 0.6 MG: 0.6 TABLET, FILM COATED ORAL at 08:01

## 2025-01-23 RX ADMIN — MUPIROCIN: 20 OINTMENT TOPICAL at 08:01

## 2025-01-23 RX ADMIN — NYSTATIN 500000 UNITS: 100000 SUSPENSION ORAL at 08:01

## 2025-01-23 RX ADMIN — LIDOCAINE 5% 2 PATCH: 700 PATCH TOPICAL at 09:01

## 2025-01-23 RX ADMIN — NYSTATIN 500000 UNITS: 100000 SUSPENSION ORAL at 04:01

## 2025-01-23 RX ADMIN — NYSTATIN 500000 UNITS: 100000 SUSPENSION ORAL at 09:01

## 2025-01-23 RX ADMIN — MYCOPHENOLATE MOFETIL 500 MG: 500 TABLET, FILM COATED ORAL at 09:01

## 2025-01-23 RX ADMIN — Medication 10 ML: at 05:01

## 2025-01-23 RX ADMIN — Medication 10 ML: at 02:01

## 2025-01-23 RX ADMIN — LIDOCAINE HYDROCHLORIDE 10 MG: 10 SOLUTION INTRAVENOUS at 12:01

## 2025-01-23 RX ADMIN — MYCOPHENOLATE MOFETIL 500 MG: 500 TABLET, FILM COATED ORAL at 08:01

## 2025-01-23 RX ADMIN — INSULIN ASPART 3 UNITS: 100 INJECTION, SOLUTION INTRAVENOUS; SUBCUTANEOUS at 08:01

## 2025-01-23 RX ADMIN — INSULIN ASPART 4 UNITS: 100 INJECTION, SOLUTION INTRAVENOUS; SUBCUTANEOUS at 04:01

## 2025-01-23 NOTE — PLAN OF CARE
No acute changes overnight. 8 beat run of alex [facility] notified.  Problem: Adult Inpatient Plan of Care  Goal: Plan of Care Review  Outcome: Progressing  Goal: Patient-Specific Goal (Individualized)  Outcome: Progressing  Goal: Absence of Hospital-Acquired Illness or Injury  Outcome: Progressing  Goal: Optimal Comfort and Wellbeing  Outcome: Progressing  Goal: Readiness for Transition of Care  Outcome: Progressing     Problem: Bariatric Environmental Safety  Goal: Safety Maintained with Care  Outcome: Progressing     Problem: Diabetes Comorbidity  Goal: Blood Glucose Level Within Targeted Range  Outcome: Progressing     Problem: Wound  Goal: Optimal Coping  Outcome: Progressing  Goal: Optimal Functional Ability  Outcome: Progressing  Goal: Absence of Infection Signs and Symptoms  Outcome: Progressing  Goal: Improved Oral Intake  Outcome: Progressing  Goal: Optimal Pain Control and Function  Outcome: Progressing  Goal: Skin Health and Integrity  Outcome: Progressing  Goal: Optimal Wound Healing  Outcome: Progressing     Problem: Skin Injury Risk Increased  Goal: Skin Health and Integrity  Outcome: Progressing     Problem: Pain Acute  Goal: Optimal Pain Control and Function  Outcome: Progressing     Problem: Infection  Goal: Absence of Infection Signs and Symptoms  Outcome: Progressing

## 2025-01-23 NOTE — PLAN OF CARE
01/23/25 0814   Post-Acute Status   Post-Acute Authorization Placement   Post-Acute Placement Status Referrals Sent   Discharge Plan   Discharge Plan A Skilled Nursing Facility     SNF list provided by [facility] Monday. Referrals sent to check bed availability. Will need [facility] faxed and 142 once [facility] is back open. Will obtain choice from accepting facilities.

## 2025-01-23 NOTE — PROGRESS NOTES
Nephrology Progress Note     History of Present Illness     Mr. Whittaker is a 69 year  male with a hx of previous ESRD likely related to diabetes and hypertension that was on dialysis several years prior to receiving a living related kidney transplant from his daughter in 2015.  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  He is followed by Dr. Gonsalez of [facility] seen last on 09/24/2024 at which time his serum creatinine was 2.2.     He was seen in the emergency department on 01/06/2025 complaining of increasing lower extremity edema.  At that time his serum creatinine was 2.8.  He returns to the emergency department 01/12/2025 with persistent lower extremity edema and associated blistering.  He also complains of bilateral knee pain to the point where he is unable to ambulate.  He attributes this to the change in weather.  His admission laboratory reveals a serum creatinine now up to 4.3 with a potassium of 5.4.  Nephrology has been asked to see and evaluate the patient.     As an outpatient he is chronically on Lasix 40 mg twice a day.  He denies the use of nonsteroidal anti-inflammatory drugs.  He denies dysuria hematuria or difficulty voiding.  He is chronically on Entresto.      Interval History   Overnight/currently:  Hypothermia appears to have resolved.  Blood cultures are pending.  Creatinine is stable.  He currently denies any chest pain, shortness of breath, nausea/vomiting.  Case management working on SNF placement.         Allergies:    is allergic to lisinopril, actos  [pioglitazone], and metformin.    Current medications:   Scheduled Meds:   cefTRIAXone (Rocephin) IV (PEDS and ADULTS)  1 g Intravenous Q24H    gabapentin  300 mg Oral TID    insulin  "glargine U-100  10 Units Subcutaneous BID    LIDOcaine  2 patch Transdermal Q24H    metoprolol succinate  25 mg Oral Daily    mupirocin   Nasal BID    mycophenolate  500 mg Oral BID    nystatin  500,000 Units Oral QID    pantoprazole  40 mg Oral BID    predniSONE  5 mg Oral Daily    sodium chloride 0.9%  10 mL Intravenous Q8H    tacrolimus  3 mg Oral BID    tamsulosin  0.4 mg Oral QHS     Continuous Infusions:  PRN Meds:.  Current Facility-Administered Medications:     0.9%  NaCl infusion (for blood administration), , Intravenous, Q24H PRN    acetaminophen, 650 mg, Oral, Q4H PRN    dextrose 50%, 12.5 g, Intravenous, PRN    dextrose 50%, 25 g, Intravenous, PRN    glucagon (human recombinant), 1 mg, Intramuscular, PRN    glucose, 16 g, Oral, PRN    glucose, 24 g, Oral, PRN    HYDROcodone-acetaminophen, 1 tablet, Oral, Q6H PRN    insulin aspart U-100, 0-10 Units, Subcutaneous, QID (AC + HS) PRN    melatonin, 6 mg, Oral, Nightly PRN    metoprolol, 5 mg, Intravenous, Q6H PRN    naloxone, 0.02 mg, Intravenous, PRN    ondansetron, 8 mg, Oral, Q8H PRN    ondansetron, 4 mg, Intravenous, Q8H PRN    polyethylene glycol, 17 g, Oral, Daily PRN    senna-docusate 8.6-50 mg, 2 tablet, Oral, Daily PRN     Physical Examination     VS/Measurements    BP (!) 129/59 (BP Location: Left arm, Patient Position: Lying)   Pulse 93   Temp 97.6 °F (36.4 °C) (Oral)   Resp 20   Ht 5' 7" (1.702 m)   Wt 130.2 kg (287 lb 0.6 oz)   SpO2 95%   BMI 44.96 kg/m²         General:  Moderately built, not in any distress.  Neck:  Supple,   Respiratory: Non-labored,  Lungs are clear to auscultation.    Cardiovascular:  Normal rate, Regular rhythm.   Abdomen:  Soft, Non-tender, Normal bowel sounds.   Muskuloskeletal:  + pedal edema          Laboratory Results   Today's Lab Results :    Recent Results (from the past 24 hours)   POCT glucose    Collection Time: 01/22/25  4:24 PM   Result Value Ref Range    POCT Glucose 218 (H) 70 - 110 mg/dL   POCT " glucose    Collection Time: 01/22/25  7:05 PM   Result Value Ref Range    POCT Glucose 234 (H) 70 - 110 mg/dL   CBC Auto Differential    Collection Time: 01/23/25  4:49 AM   Result Value Ref Range    WBC 2.97 (L) 3.90 - 12.70 K/uL    RBC 3.37 (L) 4.60 - 6.20 M/uL    Hemoglobin 8.2 (L) 14.0 - 18.0 g/dL    Hematocrit 28.2 (L) 40.0 - 54.0 %    MCV 84 82 - 98 fL    MCH 24.3 (L) 27.0 - 31.0 pg    MCHC 29.1 (L) 32.0 - 36.0 g/dL    RDW 14.3 11.5 - 14.5 %    Platelets 368 150 - 450 K/uL    MPV 10.7 9.2 - 12.9 fL    Immature Granulocytes CANCELED 0.0 - 0.5 %    Immature Grans (Abs) CANCELED 0.00 - 0.04 K/uL    nRBC 1 (A) 0 /100 WBC    Gran % 58.0 38.0 - 73.0 %    Lymph % 22.0 18.0 - 48.0 %    Mono % 16.0 (H) 4.0 - 15.0 %    Eosinophil % 0.0 0.0 - 8.0 %    Basophil % 0.0 0.0 - 1.9 %    Bands 1.0 %    Metamyelocytes 2.0 %    Myelocytes 1.0 %    Platelet Estimate Appears normal     Ovalocytes Occasional     Differential Method Manual    Comprehensive Metabolic Panel    Collection Time: 01/23/25  4:49 AM   Result Value Ref Range    Sodium 136 136 - 145 mmol/L    Potassium 4.4 3.5 - 5.1 mmol/L    Chloride 106 95 - 110 mmol/L    CO2 19 (L) 23 - 29 mmol/L    Glucose 190 (H) 70 - 110 mg/dL    BUN 55 (H) 8 - 23 mg/dL    Creatinine 2.1 (H) 0.5 - 1.4 mg/dL    Calcium 9.4 8.7 - 10.5 mg/dL    Total Protein 4.7 (L) 6.0 - 8.4 g/dL    Albumin 1.3 (L) 3.5 - 5.2 g/dL    Total Bilirubin 0.4 0.1 - 1.0 mg/dL    Alkaline Phosphatase 160 (H) 40 - 150 U/L    AST 16 10 - 40 U/L    ALT 16 10 - 44 U/L    eGFR 33 (A) >60 mL/min/1.73 m^2    Anion Gap 11 8 - 16 mmol/L   Magnesium    Collection Time: 01/23/25  4:49 AM   Result Value Ref Range    Magnesium 1.9 1.6 - 2.6 mg/dL   Tacrolimus level    Collection Time: 01/23/25  4:49 AM   Result Value Ref Range    Tacrolimus Lvl 5.8 5.0 - 15.0 ng/mL   POCT glucose    Collection Time: 01/23/25  5:09 AM   Result Value Ref Range    POCT Glucose 196 (H) 70 - 110 mg/dL   Procalcitonin    Collection Time: 01/23/25   8:30 AM   Result Value Ref Range    Procalcitonin 0.65 (H) <0.25 ng/mL       LABS:  Reviewed Yes      Intake/Output Summary (Last 24 hours) at 1/23/2025 1411  Last data filed at 1/23/2025 0250  Gross per 24 hour   Intake --   Output 1500 ml   Net -1500 ml       Assessment and Plan     ANGELITO/CKD stage 4 in his transplant kidney.  [place] to be secondary to possible intravascular volume depletion complicated by Prograf toxicity.  Creatinine back within baseline.  Stable this morning.     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  Chronic allograft nephropathy.  Followed by Dr. Gonsalez.  As above.  Continue immunosuppression with CellCept, Prograf and prednisone.  Prograf trough level 1/20 likely drawn a bit early and not accurate.  Given his time out from transplant and the fact that he has a living related donor kidney transplant, a Prograf trough level of 4-6 would be adequate.  Repeat trough from this morning 5.8.    Hypothermia.  Appears to have resolved. Blood cultures pending.  Also has an indwelling Ann.  Evidently this was replaced on the 20th due to retention.  He was placed on Flomax yesterday and we were planning to try a voiding trial this morning however his Ann remains in.  I discussed with the nursing staff to remove it.  Given that he was having hypothermia, am concerned that this puts him at greater risk for infection and would like to get that out.    Right knee pain.  Orthopedics following, he had it aspirated this morning and fluid sent for cell count, Gram stain culture and crystal analysis.  Possibly could be due to gout, uric acid from 01/17/2025 9.2.  We will recheck tomorrow morning.  Not currently on any medications.      Gross hematuria.  Likely Ann trauma.  Anticoagulants on hold.  This appears to be resolved.     Hyperkalemia.  This has resolved with Lokelma as well as holding his Entresto and potassium supplements.     Hypercalcemia.  Corrected calcium 11.56.  Electrophoresis negative.  He has  been on calcitriol outpatient which is being held here.  Avoid calcium and vitamin-D.  Question partly due to immobilization.  We will check ionized calcium in the morning.     Anemia.  Hematuria versus possible GI bleed while on Eliquis and aspirin.  Status post 1 unit of PRBCs and hemoglobin relatively stable at this point.     Hypertension CKD.  Blood pressure at goal.       HFrEF.  Currently compensated.  EF 35% 11/2024.  Followed by Dr. Man with Cardiology.     Diabetes mellitus type 2.  Defer to Hospital Medicine.        ________________________________________________  Chase Morris

## 2025-01-23 NOTE — PLAN OF CARE
Discussed poc with pt, pt verbalized understanding    Purposeful rounding every 2hours    VS wnl  Cardiac monitoring in use, pt is NSR, tele monitor # [id number]  Blood glucose monitoring   Fall precautions in place, remains injury free  Pain and nausea under control with PRN meds    Accurate I&Os  Abx given as prescribed  Bed locked at lowest position  Call light within reach    Chart check complete  Will cont with POC

## 2025-01-23 NOTE — PT/OT/SLP PROGRESS
Occupational Therapy   Treatment    Name: Mitch Whittaker  MRN: [id number]  Admitting Diagnosis:  Physical deconditioning       Recommendations:     Discharge Recommendations: Moderate Intensity Therapy  Discharge Equipment Recommendations:  to be determined by next level of care  Barriers to discharge:  Decreased caregiver support    Assessment:     Mitch Whittaker is a 69 y.o. male with a medical diagnosis of Physical deconditioning.  He presents with the following performance deficits affecting function are weakness, impaired endurance, impaired self care skills, impaired functional mobility, impaired balance, decreased coordination, decreased upper extremity function, decreased lower extremity function, decreased safety awareness, pain.     Rehab Prognosis:  Fair; patient would benefit from acute skilled OT services to address these deficits and reach maximum level of function.       Plan:     Patient to be seen 2 x/week to address the above listed problems via self-care/home management, therapeutic activities, therapeutic exercises  Plan of Care Expires: 01/31/25  Plan of Care Reviewed with: patient, daughter, family    Subjective     Chief Complaint: generalized pain  Patient/Family Comments/goals: Sit EOB  Pain/Comfort:  Pain Rating 1: 8/10  Location - Side 1: Bilateral  Location - Orientation 1: generalized  Pain Addressed 1: Reposition, Nurse notified  Pain Rating Post-Intervention 1: 8/10    Objective:     Communicated with: Tiffany prior to session.  Patient found HOB elevated with peripheral IV, pressure relief boots, telemetry upon OT entry to room.    General Precautions: Standard, fall    Orthopedic Precautions:N/A  Braces: N/A  Respiratory Status: Room air     Occupational Performance:     Bed Mobility:    Patient completed Rolling/Turning to Left with  maximal assistance of 2  Patient completed Scooting/Bridging with maximal assistance of 2  Patient completed Supine to Sit with maximal assistance of 2     Pt exhibited high amounts of pain with any movement  Pt completed EOB for 10 min with Max of 2    Activities of Daily Living:  Changing bed sheet max of 3      AMPAC 6 Click ADL: 8    Treatment & Education:  Encouraged completion of B UE AROM therex throughout the day to increase functional strength and activity tolerance needed for ADL completion.    OT role, plan of care, progression of goals, importance of continued OOB activity, ADL/functional transfer and mobility retraining, call don't fall, safety precautions, fall prevention. Pt educated on sitting in chair for 1 hour during breakfast lunch and dinner in order to change positions, regulate BP and reduce bed sores.   Pt acknowledges and agrees with POC given by OT.      Patient left with bed in chair position with all lines intact, call button in reach, chair alarm on, and nurse and family present    GOALS:   Multidisciplinary Problems       Occupational Therapy Goals          Problem: Occupational Therapy    Goal Priority Disciplines Outcome Interventions   Occupational Therapy Goal     OT, PT/OT Progressing    Description: Goals to be met by: 1/31/25     Patient will increase functional independence with ADLs by performing:    UE Dressing with Minimal Assistance.  Sitting at edge of bed x15 minutes with Stand-by Assistance.  Rolling to Bilateral with Minimal Assistance.   Supine to sit with Minimal Assistance.                         Time Tracking:     OT Date of Treatment: 01/23/25  OT Start Time: 1245  OT Stop Time: 1310  OT Total Time (min): 25 min    Billable Minutes:Therapeutic Activity 25    OT/ANNIA: ANNIA     Number of ANNIA visits since last OT visit: 1  A client care conference was performed between the JONNA and ZABRINA, prior to treatment by GERBER, to discuss the patient's status, treatment plan and established goals.  ZABRINA Mancilla    1/23/2025

## 2025-01-23 NOTE — ASSESSMENT & PLAN NOTE
X-ray from 1/6/25 shows soft tissue swelling, degenerative changes, and small effusion.   Continues to have persistent pain and decreased ROM  S/p aspiration. Cell count does not suggest infection  Gram stain, culture, and crystal are pending  Previous uric acid level elevated  Give Colchicine 0.6 mg PO x 1 dose and monitor response.

## 2025-01-23 NOTE — ASSESSMENT & PLAN NOTE
01/17/2025  Patient requiring max assists with bed transitions and feeding. High intensity therapy recommended by therapists. Patient previously independent, living at home. Referrals for rehab placed at this time.      01/18/2025  Some notable objective clinical improvement. Reports decreased ROM in UE bilaterally but predominantly his right UE. Radiography of the left hand and R shoulder unremarkable. Will obtain CT cervical spine to further assess for stenosis.     01/19/2025  Cervical spine CT shows right moderate foraminal stenosis at C3-4 and C5-6 secondary to spurring of the uncovertebral joints.  Will obtain MRI of spine and consult orthospine to assess to see if findings could be contributing to his significant UE weakness and pain.     1/20/2025  Patient's daughter tells me patient independently prior to hospitalization. Patient complains of upper and lower bilateral weakness. While Prograf toxicity could play a part in progressive decline, he will need additional work up. CT head unremarkable. Kidney function has returned to baseline.  Vitamin B12 pending. Neurosurgery/Neurology consult pending. He is awaiting rehab vs SNF placement.     1/21/2025  Neurology input appreciated  Follow Vitamin B12, Thiamine, Copper  Continue PT/OT  Awaiting SNF consultation    1/23/25  S/p knee aspiration today.   Cell count unremarkable  Awaiting gram stain, crystals, and culture  Continue PT/TO.   SNF  placement pending

## 2025-01-23 NOTE — SUBJECTIVE & OBJECTIVE
Interval History: see hospital course.    Review of Systems   Constitutional:  Positive for activity change and appetite change. Negative for fever.   HENT:  Negative for hearing loss.    Eyes:  Negative for visual disturbance.   Respiratory:  Negative for cough and shortness of breath.    Cardiovascular:  Positive for leg swelling. Negative for chest pain and palpitations.   Genitourinary:  Negative for difficulty urinating, dysuria, frequency and hematuria.   Musculoskeletal:  Negative for arthralgias and back pain.   Skin:  Positive for wound. Negative for color change and pallor.   Neurological:  Positive for weakness. Negative for seizures, syncope, numbness and headaches.   Hematological:  Does not bruise/bleed easily.   Psychiatric/Behavioral:  The patient is not nervous/anxious.        Vital Signs (Most Recent):  Temp: 97.7 °F (36.5 °C) (01/23/25 1225)  Pulse: 92 (01/23/25 1523)  Resp: 20 (01/23/25 1225)  BP: 115/62 (01/23/25 1225)  SpO2: 95 % (01/23/25 1225) Vital Signs (24h Range):  Temp:  [97 °F (36.1 °C)-97.7 °F (36.5 °C)] 97.7 °F (36.5 °C)  Pulse:  [87-94] 92  Resp:  [18-22] 20  SpO2:  [93 %-96 %] 95 %  BP: (102-129)/(50-62) 115/62     Weight: 130.2 kg (287 lb 0.6 oz)  Body mass index is 44.96 kg/m².    Intake/Output Summary (Last 24 hours) at 1/23/2025 1652  Last data filed at 1/23/2025 1400  Gross per 24 hour   Intake --   Output 2600 ml   Net -2600 ml         Physical Exam  Constitutional:       General: He is not in acute distress.     Appearance: Normal appearance. He is well-developed. He is obese. He is ill-appearing. He is not toxic-appearing or diaphoretic.   HENT:      Head: Normocephalic and atraumatic.   Eyes:      Extraocular Movements: Extraocular movements intact.   Cardiovascular:      Rate and Rhythm: Normal rate and regular rhythm.      Heart sounds: Normal heart sounds. No murmur heard.  Pulmonary:      Effort: Pulmonary effort is normal. No respiratory distress.      Breath sounds:  Normal breath sounds. No wheezing.   Abdominal:      General: Bowel sounds are normal. There is no distension.      Palpations: Abdomen is soft. There is no mass.      Tenderness: There is no abdominal tenderness.   Musculoskeletal:         General: Swelling present.      Cervical back: Normal range of motion and neck supple.      Right lower leg: Edema present.      Left lower leg: Edema present.      Comments: Bilateral elbow tenderness. Right knee tenderness, <ROM due to pain   Skin:     General: Skin is warm and dry.      Capillary Refill: Capillary refill takes 2 to 3 seconds.   Neurological:      General: No focal deficit present.      Mental Status: He is alert and oriented to person, place, and time.      Cranial Nerves: No cranial nerve deficit.      Motor: Weakness present.   Psychiatric:         Mood and Affect: Mood normal.         Behavior: Behavior normal.         Thought Content: Thought content normal.         Judgment: Judgment normal.             Significant Labs: All pertinent labs within the past 24 hours have been reviewed.  CBC:   Recent Labs   Lab 01/23/25  0449   WBC 2.97*   HGB 8.2*   HCT 28.2*        CMP:   Recent Labs   Lab 01/22/25  0614 01/23/25  0449    136   K 4.3 4.4    106   CO2 19* 19*   * 190*   BUN 56* 55*   CREATININE 2.2* 2.1*   CALCIUM 9.6 9.4   PROT  --  4.7*   ALBUMIN 1.3* 1.3*   BILITOT  --  0.4   ALKPHOS  --  160*   AST  --  16   ALT  --  16   ANIONGAP 11 11       Significant Imaging: I have reviewed all pertinent imaging results/findings within the past 24 hours.

## 2025-01-23 NOTE — ASSESSMENT & PLAN NOTE
Creatine stable for now. BMP reviewed- noted Estimated Creatinine Clearance: 41.8 mL/min (A) (based on SCr of 2.1 mg/dL (H)). according to latest data. Based on current GFR, CKD stage is stage 4 - GFR 15-29.  Monitor UOP and serial BMP and adjust therapy as needed. Renally dose meds. Avoid nephrotoxic medications and procedures.  Pt is d/p Renal Transplant in 2015, on Prograf and Cellcept    Edwina on CKD 4- sec to Prograf toxicity- Bleeding- started on IVF  Cont IVF  Improving with IVF, Cr coming down to 3    01/23/2025  Stable

## 2025-01-23 NOTE — CONSULTS
Orthopaedic Surgery Consult Note  1/23/25    CC: Right knee pain    HPI: Mitch is a 70yo male with DM2, ESRD s/p transplant, CHF, CAD on Eliquis who is admitted for severe deconditioning and c/o chronic R knee pain. He also c/o b/l elbow pain although that has improved. He denies any trauma. No reported falls. No fevers or chills. Due to persistent R knee pain I was consulted.     ROS: No fevers or chills. No reported N/V, chest pain, abdominal pain, N/T/P.     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 RNA NEGATIVE 5/2015, 2021, 2022    History of colon polyps     HPTH (hyperparathyroidism)     Hyperlipidemia     Hypertension associated with stage 3 chronic kidney disease due to type 2 diabetes mellitus     LBBB (left bundle branch block) 12/20/2021    Morbid obesity with BMI of 45.0-49.9, adult     Nephrolithiasis 6/7/2013    PCO (posterior capsular opacification), left 03/04/2019    Proteinuria     resolved s/p transplant    S/P kidney transplant     Sleep apnea     Type 2 diabetes, uncontrolled, with retinopathy     Type II diabetes mellitus with renal manifestations      Prior to Admission medications    Medication Sig Start Date End Date Taking? Authorizing Provider   albuterol  "(PROVENTIL) 2.5 mg /3 mL (0.083 %) nebulizer solution Take 3 mLs (2.5 mg total) by nebulization every 4 to 6 hours as needed for Wheezing or Shortness of Breath. 8/28/24 8/28/25  Valdo Mason MD   albuterol (PROVENTIL/VENTOLIN HFA) 90 mcg/actuation inhaler Inhale 2 puffs into the lungs every 4 (four) hours as needed for Wheezing or Shortness of Breath. 8/28/24   Valdo Mason MD   amitriptyline (ELAVIL) 25 MG tablet Take 1 tablet (25 mg total) by mouth nightly as needed for Insomnia. 4/18/23   Noel Almonte MD   apixaban (ELIQUIS) 5 mg Tab Take 1 tablet (5 mg total) by mouth 2 (two) times daily. 8/26/24   Micah Muhammad MD   aspirin (ECOTRIN) 81 MG EC tablet Take 1 tablet by mouth Daily.     Provider, Historical   BD ULTRA-FINE MINI PEN NEEDLE 31 gauge x 3/16" Ndle Use to inject insulin as needed up to 4 times daily 11/7/23   Alix Kowalski,    blood sugar diagnostic (CONTOUR NEXT TEST STRIPS) Strp Test blood sugar 4 (four) times daily before meals and nightly. 8/27/24   Valery Caal MD   blood-glucose meter (FREESTYLE LITE METER) kit 1 each 4 (four) times daily. 12/10/22 6/24/24  Eleanor Pizano NP   blood-glucose sensor (DEXCOM G7 SENSOR) Alba Use as directed for continuous glucose monitoring; Change every 10 days. 8/30/24 8/30/25  Sofía Geller NP   calcitRIOL (ROCALTROL) 0.25 MCG Cap Take 1 capsule (0.25 mcg total) by mouth once daily. 5/28/24 5/28/25  Marci Pan MD   famotidine (PEPCID) 20 MG tablet TAKE ONE TABLET BY MOUTH EVERY EVENING 4/26/19   Cornelio Ham MD   ferrous sulfate (FEOSOL) 325 mg (65 mg iron) Tab tablet Take 1 tablet (325 mg total) by mouth daily with breakfast. 2/27/24   Da Bojorquez MD   fish oil-omega-3 fatty acids 300-1,000 mg capsule Take 1 g by mouth once daily.    Provider, Historical   furosemide (LASIX) 80 MG tablet Take one-half tablet (40 mg) by mouth 2 (two) times daily. 7/2/24   Hany Gonsalez MD   HYDROcodone-acetaminophen " (NORCO) 5-325 mg per tablet Take 1 tablet by mouth every 6 (six) hours as needed for Pain. 1/6/25   Jaymie Fried DO   insulin glargine U-100, Lantus, (LANTUS SOLOSTAR U-100 INSULIN) 100 unit/mL (3 mL) InPn pen Inject 30 Units into the skin 2 (two) times daily. 12/12/24   Kim Plascencia NP   ketoconazole (NIZORAL) 200 mg Tab Take ½ tablet (100 mg total) by mouth once daily. 9/24/24   Hany Gnosalez MD   lancets (MICROLET LANCET) Misc 100 lancets by Misc.(Non-Drug; Combo Route) route 4 (four) times daily before meals and nightly. 12/10/22   Eleanor Pizano NP   magnesium oxide (MAG-OX) 400 mg (241.3 mg magnesium) tablet Take 1 tablet (400 mg total) by mouth once daily. 8/9/23   Alix Kowalski,    metoprolol succinate (TOPROL-XL) 25 MG 24 hr tablet Take 1 tablet (25 mg total) by mouth once daily. 6/24/24 6/9/25  Hany Gonsalez MD   multivitamin (THERAGRAN) tablet Take 1 tablet by mouth once daily.    Provider, Historical   mycophenolate (CELLCEPT) 250 mg Cap Take 2 capsules (500 mg total) by mouth 2 (two) times daily. 7/1/24 7/1/25  Hany Gonsalez MD   NOVOLOG FLEXPEN U-100 INSULIN 100 unit/mL (3 mL) InPn pen Inject 25 Units into the skin 3 (three) times daily with meals. 1/7/25   Sofía Geller NP   ondansetron (ZOFRAN) 4 MG tablet Take 1 tablet (4 mg total) by mouth every 6 (six) hours. 5/18/22   Dianne Owusu MD   ondansetron (ZOFRAN-ODT) 4 MG TbDL Dissolve 1 tablet (4 mg total) by mouth every 8 (eight) hours as needed (Nausea/vomiting). 1/6/25   Jaymie Fried DO   potassium chloride SA (K-DUR,KLOR-CON) 20 MEQ tablet Take 2 tablets (40 mEq total) by mouth once daily. 7/2/24   Micah Muhammad MD   predniSONE (DELTASONE) 5 MG tablet Take 1 tablet (5 mg total) by mouth once daily. 6/17/24 6/17/25  Hany Gonsalez MD   rosuvastatin (CRESTOR) 40 MG Tab Take 1 tablet (40 mg total) by mouth once daily. 7/2/24   Hany Gonsalez MD   sacubitriL-valsartan (ENTRESTO)  mg per tablet Take 1 tablet by  mouth 2 (two) times daily. 8/26/24   Micah Muhammad MD   semaglutide (OZEMPIC) 2 mg/dose (8 mg/3 mL) PnIj Inject 2 mg into the skin every 7 days. 1/3/25 1/3/26  Micah Muhammad MD   tacrolimus (PROGRAF) 1 MG Cap Take 5 capsules (5 mg total) by mouth every 12 (twelve) hours. 9/24/24 9/24/25  Hany Gonsalez MD   triamcinolone acetonide 0.1% (KENALOG) 0.1 % ointment Apply topically 2 (two) times daily. Use on bilateral lower legs. 10/10/22   Michelle Zarate MD       Past Surgical History:   Procedure Laterality Date    CARDIAC CATHETERIZATION  01/01/2008    normal coronary    CARPAL TUNNEL RELEASE Right 12/01/2023    Procedure: RELEASE, CARPAL TUNNEL;  Surgeon: Noel Almonte MD;  Location: [facility] OR;  Service: Orthopedics;  Laterality: Right;    CARPAL TUNNEL RELEASE Left 7/18/2024    Procedure: RELEASE, CARPAL TUNNEL;  Surgeon: Noel Almonte MD;  Location: [facility] OR;  Service: Orthopedics;  Laterality: Left;    COLONOSCOPY N/A 04/05/2018    Procedure: COLONOSCOPY;  Surgeon: Chava Ronquillo MD;  Location: [facility];  Service: Endoscopy;  Laterality: N/A;    COLONOSCOPY N/A 05/02/2022    Procedure: COLONOSCOPY;  Surgeon: Alix Puente MD;  Location: [facility];  Service: Endoscopy;  Laterality: N/A;    COLONOSCOPY N/A 06/07/2023    Procedure: COLONOSCOPY - rule out CMV  Cardiac clearance/Eliquis hold approval received on 05/21/23 per Dr. Meade, cardiology.  Note in encounters.  LB;  Surgeon: Daniella Shah MD;  Location: [facility] ENDO;  Service: Endoscopy;  Laterality: N/A;    ESOPHAGOGASTRODUODENOSCOPY N/A 03/26/2024    Procedure: EGD (ESOPHAGOGASTRODUODENOSCOPY) 3/1-pt cleared, ok to hold eliquis for 3 days;  Surgeon: Daniella Shah MD;  Location: [facility] ENDO;  Service: Endoscopy;  Laterality: N/A;    KIDNEY TRANSPLANT  2015    RETINAL LASER PROCEDURE       Review of patient's allergies indicates:   Allergen Reactions    Lisinopril Other (See Comments)     Other reaction(s):  cough    Actos   [pioglitazone] Other (See Comments)     Other reaction(s): CHF    Metformin Other (See Comments)     Other reaction(s): renal insuff  Other reaction(s): CHF     Exam:  Vitals:    01/23/25 1134   BP:    Pulse: 93   Resp:    Temp:      Patient is alert on arrival and responsive. EOMI. Facial muscles symmetric. Voice w/ good intonation. Family members x2 at bedside.     Exam of RUE shows largely painless moderate arc ROM of elbow. Slight pitting edema at elbow w/ TTP but tolerable. No open wounds.     Exam of Right knee does show moderate effusion w/ TTP over superolateral joint space. There is moderate pain to short-medium arc ROM.     No calf pain to compression on either side. No pain to L knee ROM.     Fires EHL b/l. BLE well-perfused.     Imaging:   XR Right knee 1/6/25 shows no acute displaced fractures or dislocations. There is moderate joint effusion. There is severe lateral tibiofemoral > medial TF degenerative change. Mild valgus alignment.     Labs:   CBC 1/23/25 reviewed and shows WBC 2.97  CMP 1/23/25 reviewed and shows Cr 2.1, Glucose 190, Ca 9.4, Na 136, K 4.4; no anion gap  HbA1c 12/17/24 is 5.7.     A/P: Mitch is a 68yo male w/ CAD on Eliquis, CHF, ESRD s/p transplant, and DM2 (last A1c 5.7) who presents w/ severe deconditioning w/ c/o chronic atraumatic onset Right knee pain. Exam did show moderate effusion w/ pain to PROM. Given this and c/f possible infectious vs gouty effusion I did suggest aspiration of the Right knee. I reviewed the risks of septic joint, nerve injury, hematoma formation/blood vessel injury. He and his family understood and wished to proceed.     Procedure note: Right knee aspiration: Informed consent was obtained and the skin overlying the Right superolateral joint space was prepped in sterile fashion with Chloraprep and Betadine. Next I anesthetized the injection site with 5cc 1% Lidocaine plain. The patient tolerated this without adverse reaction, specifically cardiopulmonary  or anaphylaxis. After allowing time for anesthetic to be effective, I then aspirated the Right knee superolateral joint space/suprapatellar pouch with a 50cc syringe and a 20 gauge spinal needle. I was able to aspirate 4-5cc of non-cloudy serosanguinous fluid. The injection site was then cleaned and a bandage applied. The patient tolerate the procedure well and there were no complications.     At present, there is low concern for septic joint. Will follow labs results--fluid sent for cell count, gram stain, culture, and crystal analysis. Additional recommendations as follows:      -Immobilization: None.   -Activity: WBAT/ROMAT w/ BUE assist  -Diet: Reg  -Abx: None indicated at present for R knee.   -VTE ppx: Continue Eliquis--do not need to hold. Patient is at high risk of VTE. SCDs in bed.   -GI ppx: Omeprazole vs Pantoprazole qd for ulcer prevention while admitted.   -Bowel ppx: Senna/Colace qd, Miralax 2nd line for opioid/bowel ppx. Hold for diarrhea or loose stool.   -Pain control: PO w/ IV for BTP. Recommend the following:   -Tylenol 650mg 5x daily while awake.    -Oxycodone 2.5-5mg q4h prn moderate-severe pain.   -Follow-up: TBD.    Dao Le Jr. MD  Hand and Upper Extremity Surgery  [facility]

## 2025-01-23 NOTE — PROGRESS NOTES
[facility] Medicine  Progress Note    Patient Name: Mitch Whittaker  MRN: [id number]  Patient Class: IP- Inpatient   Admission Date: 1/12/2025  Length of Stay: 11 days  Attending Physician: Carlos Mathew MD  Primary Care Provider: Valery Caal MD    Subjective     Principal Problem:Physical deconditioning    HPI:  Mitch Whittaker is a 69 y.o. male patient with a PMHx of CHF- EF 40%, anemia, hyperparathyroidism, type 2 DM, s/p kidney transplant 2015 on Prograf and Cellcept, DVT on Eliquis, MARION, HLD, HTN, CKD, Hep C, and arthritis who presents to the Emergency Department for evaluation of nausea vomiting and diarrhea over the past several days (both of which have improved), but felt weak and couldn't move around like normal. no abd pain, no systemic symptoms, stool now formed. Pt is a very poor historian. According to nursing staff, family reported that the pt has been sitting in his chair for the last 3 days. Pt reports that he has had two episodes of diarrhea since yesterday, but due to the increasing weakness in his knees he was unable to get up from his chair. Pt normally ambulates with assistance from a walker. Symptoms are constant and moderate in severity. Associated sxs include blood in stool (x4 days) and n/v yesterday, but none today after PO. Patient denies any fever, abdominal pain, appetite changes, SOB, dysuria, and all other sxs at this time. No prior tx. Pt is on Eliquis. No further complaints or concerns at this time.   In the ER, VS /65, , Sats 100% on RA, Afeb, WBC 3.7, H/H 9.5/33, Bun/Cr 82/4.3, K 6.7- treated aggressively in the ER and rechecked and repeat 5.4. He is heme positive as well. C Diff Neg. She is being admitted for possible Hyperkalemia, acute GI Bleed, ANGELITO.     Overview/Hospital Course:  69 y.o. male patient with a PMHx of CHF- EF 40%, anemia, hyperparathyroidism, type 2 DM, s/p kidney transplant 2015 on Prograf and Cellcept, DVT on  Eliquis, MARION, HLD, HTN, CKD, Hep C, and arthritis who presents to the Emergency Department for evaluation of nausea vomiting and diarrhea over the past several days (both of which have improved), but felt weak and couldn't move around like normal. no abd pain, no systemic symptoms, stool now formed. Pt is a very poor historian. According to nursing staff, family reported that the pt has been sitting in his chair for the last 3 days. Pt reports that he has had two episodes of diarrhea since yesterday, but due to the increasing weakness in his knees he was unable to get up from his chair. Pt normally ambulates with assistance from a walker. Symptoms are constant and moderate in severity. Associated sxs include blood in stool (x4 days) and n/v yesterday, but none today after PO. Patient denies any fever, abdominal pain, appetite changes, SOB, dysuria, and all other sxs at this time. No prior tx. Pt is on Eliquis. No further complaints or concerns at this time.   In the ER, VS /65, , Sats 100% on RA, Afeb, WBC 3.7, H/H 9.5/33, Bun/Cr 82/4.3, K 6.7- treated aggressively in the ER and rechecked and repeat 5.4. He is heme positive as well. C Diff Neg. She is being admitted for possible Hyperkalemia, acute GI Bleed, ANGELITO.     1/13- Appreciate Renal and GI input- pt looks and feels a little better, stronger, more alert and responsive. Wife and daughter are here. His prograf level very high- 32- hence Held. It seems that he was getting Prograf toxicity at home which then resulted in Hematuria, ABL Anemia, ANGELITO and Hyperkalemia. Pt also felt weak and low sugars, so drank a lot of OJ which further raised his K level. Does not appears to have true GI bleed. But has hematuria- hence Purewick catheter placed and Dr. Bennett also started him on IVF- hematuria appears to be clearing. Pt feeling better, will check labs in am and start PT/OT. K was 6.1 this am- started on lokelma.     1/14- looks a little better, no melena  but still has hematuria. H/h stable, 8.3/28, K still 6, Bun.Cr still high 83/4. Repeat Prograf level pending. VSS Afeb, he is more alert and talking. Wound care wrapped his legs with moderate compression. Had mod R SFA stenosis, vascular evaluated him and will continue to monitor him, no surgery or angioplasty needed at this time. Will start PT/OT in am. Cont IVF, If Hematuria persists, start CBI in am.     1/15- looks better but c/o pain in his legs which are in a compression socks. Good response to iVF, Hematuria getting better and Cr down to 3 now with good urine output. H/H dropped to 7.2/22 sec to IVF and Hematuria. Still await repeat prograf level. Getting PT/OT, started on Norco for pain control.      1/16- resting quietly, comfortable, making good amount of urine, hematuria almost cleared with IVF. Bun/Cr down to 58/2.9. K normal, lokelm d/koki. H/H improved to 7.7/26. Prograf noiw 5.2, will restart at 3 mg bid. Stop hydration in am, start PT/OT. D/c home soon.     01/17/2025  Patient requiring max assists with bed transitions and feeding. High intensity therapy recommended by therapists. Patient previously independent, living at home. Referrals for rehab placed at this time.      01/18/2025  Some notable objective clinical improvement. Reports decreased ROM in UE bilaterally but predominantly his right UE. Radiography of the left hand and R shoulder unremarkable. Will obtain CT cervical spine to further assess for stenosis.     01/19/2025  Cervical spine CT shows right moderate foraminal stenosis at C3-4 and C5-6 secondary to spurring of the uncovertebral joints.  Will obtain MRI of spine and consult orthospine to assess to see if findings could be contributing to his significant UE weakness and pain.     1/20/2025:   MRI Cervical/Thoracic/Lumbar showed multilevel spondylosis mild to moderate but no acute findings. Patient's daughter tells me patient independently prior to hospitalization. Patient complains  of upper and lower bilateral weakness. While Prograf toxicity could play a part in progressive decline, he will need additional work up. CT head unremarkable. Kidney function has returned to baseline.  Vitamin B12 pending. Neurosurgery/Neurology consult pending. He is awaiting rehab vs SNF placement.     1/21/2025  MRI brain shows atrophy and microvascular changes but no acute findings. His progressive weakness was evaluated by Neurology who recommended obtaining thiamine, copper, vitamin E, and vitamin D level. Continues to have hematuria on UA. Order high risk urine culture and empirically start Rocephin. Will consider Urology consult pending hospital course.    1/22/2025  Kidney function stable. Hypothermic overnight, improved with heating blanket. Thyroid studies and infectious work up unremarkable. Suspect hypothermia could be related to chilled room condition. Plans for SNF.     1/23/2025  Patient continues to have bilateral elbow and right knee tenderness and <ROM. Discussed X-ray of knee findings with Orthopedic surgery who agrees to perform knee aspiration.  Cell count unremarkable. Crystals, Gram stain, and cultures are still pending. Will give dose of colchicine 0.6mg x 1 and monitor response.  Ann catheter discontinued today with plans for voiding trial. Plans to discharge to SNF.    Interval History: see hospital course.    Review of Systems   Constitutional:  Positive for activity change and appetite change. Negative for fever.   HENT:  Negative for hearing loss.    Eyes:  Negative for visual disturbance.   Respiratory:  Negative for cough and shortness of breath.    Cardiovascular:  Positive for leg swelling. Negative for chest pain and palpitations.   Genitourinary:  Negative for difficulty urinating, dysuria, frequency and hematuria.   Musculoskeletal:  Negative for arthralgias and back pain.   Skin:  Positive for wound. Negative for color change and pallor.   Neurological:  Positive for weakness.  Negative for seizures, syncope, numbness and headaches.   Hematological:  Does not bruise/bleed easily.   Psychiatric/Behavioral:  The patient is not nervous/anxious.        Vital Signs (Most Recent):  Temp: 97.7 °F (36.5 °C) (01/23/25 1225)  Pulse: 92 (01/23/25 1523)  Resp: 20 (01/23/25 1225)  BP: 115/62 (01/23/25 1225)  SpO2: 95 % (01/23/25 1225) Vital Signs (24h Range):  Temp:  [97 °F (36.1 °C)-97.7 °F (36.5 °C)] 97.7 °F (36.5 °C)  Pulse:  [87-94] 92  Resp:  [18-22] 20  SpO2:  [93 %-96 %] 95 %  BP: (102-129)/(50-62) 115/62     Weight: 130.2 kg (287 lb 0.6 oz)  Body mass index is 44.96 kg/m².    Intake/Output Summary (Last 24 hours) at 1/23/2025 1652  Last data filed at 1/23/2025 1400  Gross per 24 hour   Intake --   Output 2600 ml   Net -2600 ml         Physical Exam  Constitutional:       General: He is not in acute distress.     Appearance: Normal appearance. He is well-developed. He is obese. He is ill-appearing. He is not toxic-appearing or diaphoretic.   HENT:      Head: Normocephalic and atraumatic.   Eyes:      Extraocular Movements: Extraocular movements intact.   Cardiovascular:      Rate and Rhythm: Normal rate and regular rhythm.      Heart sounds: Normal heart sounds. No murmur heard.  Pulmonary:      Effort: Pulmonary effort is normal. No respiratory distress.      Breath sounds: Normal breath sounds. No wheezing.   Abdominal:      General: Bowel sounds are normal. There is no distension.      Palpations: Abdomen is soft. There is no mass.      Tenderness: There is no abdominal tenderness.   Musculoskeletal:         General: Swelling present.      Cervical back: Normal range of motion and neck supple.      Right lower leg: Edema present.      Left lower leg: Edema present.      Comments: Bilateral elbow tenderness. Right knee tenderness, <ROM due to pain   Skin:     General: Skin is warm and dry.      Capillary Refill: Capillary refill takes 2 to 3 seconds.   Neurological:      General: No focal  deficit present.      Mental Status: He is alert and oriented to person, place, and time.      Cranial Nerves: No cranial nerve deficit.      Motor: Weakness present.   Psychiatric:         Mood and Affect: Mood normal.         Behavior: Behavior normal.         Thought Content: Thought content normal.         Judgment: Judgment normal.             Significant Labs: All pertinent labs within the past 24 hours have been reviewed.  CBC:   Recent Labs   Lab 01/23/25  0449   WBC 2.97*   HGB 8.2*   HCT 28.2*        CMP:   Recent Labs   Lab 01/22/25  0614 01/23/25  0449    136   K 4.3 4.4    106   CO2 19* 19*   * 190*   BUN 56* 55*   CREATININE 2.2* 2.1*   CALCIUM 9.6 9.4   PROT  --  4.7*   ALBUMIN 1.3* 1.3*   BILITOT  --  0.4   ALKPHOS  --  160*   AST  --  16   ALT  --  16   ANIONGAP 11 11       Significant Imaging: I have reviewed all pertinent imaging results/findings within the past 24 hours.    Assessment and Plan     * Severe physical deconditioning  01/17/2025  Patient requiring max assists with bed transitions and feeding. High intensity therapy recommended by therapists. Patient previously independent, living at home. Referrals for rehab placed at this time.      01/18/2025  Some notable objective clinical improvement. Reports decreased ROM in UE bilaterally but predominantly his right UE. Radiography of the left hand and R shoulder unremarkable. Will obtain CT cervical spine to further assess for stenosis.     01/19/2025  Cervical spine CT shows right moderate foraminal stenosis at C3-4 and C5-6 secondary to spurring of the uncovertebral joints.  Will obtain MRI of spine and consult orthospine to assess to see if findings could be contributing to his significant UE weakness and pain.     1/20/2025  Patient's daughter tells me patient independently prior to hospitalization. Patient complains of upper and lower bilateral weakness. While Prograf toxicity could play a part in progressive  decline, he will need additional work up. CT head unremarkable. Kidney function has returned to baseline.  Vitamin B12 pending. Neurosurgery/Neurology consult pending. He is awaiting rehab vs SNF placement.     1/21/2025  Neurology input appreciated  Follow Vitamin B12, Thiamine, Copper  Continue PT/OT  Awaiting SNF consultation    1/23/25  S/p knee aspiration today.   Cell count unremarkable  Awaiting gram stain, crystals, and culture  Continue PT/TO.   SNF  placement pending    Right knee pain  X-ray from 1/6/25 shows soft tissue swelling, degenerative changes, and small effusion.   Continues to have persistent pain and decreased ROM  S/p aspiration. Cell count does not suggest infection  Gram stain, culture, and crystal are pending  Previous uric acid level elevated  Give Colchicine 0.6 mg PO x 1 dose and monitor response.      Urinary retention  Gan catheter placed 1/21/25  Flomax started 1/22/25  Remove gan 1/23/25      Tacrolimus-induced GI toxicity  Prograf level very high  Prograf on hold    Await repeat Prograf level    Repeat levels normal- will restart Prograf from am at 3 mg bid    1/23/25  Repeat level pending      Gross hematuria  Stable, has Purewick catheter now  Getting IVF, if gets worse, may need CBI    Still persists but a little better  If no improvement tomorrow- will try CBI    Improving with good Urine output, CBI not needed  Cont close monitoring      Improving, cont IVF    1/20/25  Improved  IVFs discontinued    1/21/25  Microscopic urine >100  Add high risk urine culture  Empirically place on Rocephin  Consider Urology consultation    1/22/25  Urine culture pending    CKD (chronic kidney disease) stage 4, GFR 15-29 ml/min  Creatine stable for now. BMP reviewed- noted Estimated Creatinine Clearance: 41.8 mL/min (A) (based on SCr of 2.1 mg/dL (H)). according to latest data. Based on current GFR, CKD stage is stage 4 - GFR 15-29.  Monitor UOP and serial BMP and adjust therapy as needed.  Renally dose meds. Avoid nephrotoxic medications and procedures.  Pt is d/p Renal Transplant in 2015, on Prograf and Cellcept    Edwina on CKD 4- sec to Prograf toxicity- Bleeding- started on IVF  Cont IVF  Improving with IVF, Cr coming down to 3    01/23/2025  Stable      ABL Anemia plus anemia of CKD 4      Anemia is likely due to acute blood loss which was from CKD, s/p Renal transplant and chronic disease due to Chronic Kidney Disease. Most recent hemoglobin and hematocrit are listed below.  Recent Labs     01/20/25  0458   HGB 8.3*  8.3*   HCT 29.1*  29.1*       01/22/2025  stable      VTE Risk Mitigation (From admission, onward)           Ordered     Reason for No Pharmacological VTE Prophylaxis  Once        Question:  Reasons:  Answer:  Active Bleeding    01/12/25 1729     IP VTE HIGH RISK PATIENT  Once         01/12/25 1729     Place sequential compression device  Until discontinued         01/12/25 1729                    Discharge Planning   GRETEL:      Code Status: Full Code   Medical Readiness for Discharge Date:   Discharge Plan A: Skilled Nursing Facility              Darya Maravilla NP  Department of Hospital Medicine   '[place] - Telemetry ([facility])

## 2025-01-23 NOTE — ASSESSMENT & PLAN NOTE
Prograf level very high  Prograf on hold    Await repeat Prograf level    Repeat levels normal- will restart Prograf from am at 3 mg bid    1/23/25  Repeat level pending

## 2025-01-24 LAB
ANION GAP SERPL CALC-SCNC: 9 MMOL/L (ref 8–16)
ANISOCYTOSIS BLD QL SMEAR: SLIGHT
BACTERIA UR CULT: NO GROWTH
BASOPHILS # BLD AUTO: ABNORMAL K/UL (ref 0–0.2)
BASOPHILS NFR BLD: 1 % (ref 0–1.9)
BODY FLD TYPE: ABNORMAL
BUN SERPL-MCNC: 49 MG/DL (ref 8–23)
CA-I BLDV-SCNC: 1.35 MMOL/L (ref 1.06–1.42)
CALCIUM SERPL-MCNC: 9.9 MG/DL (ref 8.7–10.5)
CHLORIDE SERPL-SCNC: 110 MMOL/L (ref 95–110)
CO2 SERPL-SCNC: 22 MMOL/L (ref 23–29)
CORTIS SERPL-MCNC: 6.8 UG/DL
CREAT SERPL-MCNC: 1.9 MG/DL (ref 0.5–1.4)
CRYSTALS FLD MICRO: POSITIVE
DACRYOCYTES BLD QL SMEAR: ABNORMAL
DIFFERENTIAL METHOD BLD: ABNORMAL
EOSINOPHIL # BLD AUTO: ABNORMAL K/UL (ref 0–0.5)
EOSINOPHIL NFR BLD: 1 % (ref 0–8)
ERYTHROCYTE [DISTWIDTH] IN BLOOD BY AUTOMATED COUNT: 14.2 % (ref 11.5–14.5)
EST. GFR  (NO RACE VARIABLE): 37 ML/MIN/1.73 M^2
GLUCOSE SERPL-MCNC: 219 MG/DL (ref 70–110)
HCT VFR BLD AUTO: 30.3 % (ref 40–54)
HGB BLD-MCNC: 8.7 G/DL (ref 14–18)
HYPOCHROMIA BLD QL SMEAR: ABNORMAL
IMM GRANULOCYTES # BLD AUTO: ABNORMAL K/UL (ref 0–0.04)
IMM GRANULOCYTES NFR BLD AUTO: ABNORMAL % (ref 0–0.5)
INTERPRETATION SERPL IFE-IMP: NORMAL
LYMPHOCYTES # BLD AUTO: ABNORMAL K/UL (ref 1–4.8)
LYMPHOCYTES NFR BLD: 23 % (ref 18–48)
MAGNESIUM SERPL-MCNC: 1.8 MG/DL (ref 1.6–2.6)
MCH RBC QN AUTO: 24.2 PG (ref 27–31)
MCHC RBC AUTO-ENTMCNC: 28.7 G/DL (ref 32–36)
MCV RBC AUTO: 84 FL (ref 82–98)
METAMYELOCYTES NFR BLD MANUAL: 2 %
MONOCYTES # BLD AUTO: ABNORMAL K/UL (ref 0.3–1)
MONOCYTES NFR BLD: 14 % (ref 4–15)
MYELOCYTES NFR BLD MANUAL: 6 %
NEUTROPHILS NFR BLD: 51 % (ref 38–73)
NEUTS BAND NFR BLD MANUAL: 2 %
NRBC BLD-RTO: 1 /100 WBC
OVALOCYTES BLD QL SMEAR: ABNORMAL
PATH INTERP FLD-IMP: NORMAL
PATHOLOGIST INTERPRETATION IFE: NORMAL
PLATELET # BLD AUTO: 375 K/UL (ref 150–450)
PLATELET BLD QL SMEAR: ABNORMAL
PMV BLD AUTO: 10.8 FL (ref 9.2–12.9)
POCT GLUCOSE: 217 MG/DL (ref 70–110)
POCT GLUCOSE: 250 MG/DL (ref 70–110)
POCT GLUCOSE: 265 MG/DL (ref 70–110)
POCT GLUCOSE: 277 MG/DL (ref 70–110)
POIKILOCYTOSIS BLD QL SMEAR: SLIGHT
POTASSIUM SERPL-SCNC: 4.5 MMOL/L (ref 3.5–5.1)
RBC # BLD AUTO: 3.6 M/UL (ref 4.6–6.2)
SODIUM SERPL-SCNC: 141 MMOL/L (ref 136–145)
URATE SERPL-MCNC: 11.1 MG/DL (ref 3.4–7)
WBC # BLD AUTO: 3 K/UL (ref 3.9–12.7)

## 2025-01-24 PROCEDURE — 25000003 PHARM REV CODE 250: Performed by: INTERNAL MEDICINE

## 2025-01-24 PROCEDURE — 83735 ASSAY OF MAGNESIUM: CPT | Performed by: NURSE PRACTITIONER

## 2025-01-24 PROCEDURE — 84550 ASSAY OF BLOOD/URIC ACID: CPT

## 2025-01-24 PROCEDURE — 21400001 HC TELEMETRY ROOM

## 2025-01-24 PROCEDURE — 99232 SBSQ HOSP IP/OBS MODERATE 35: CPT | Mod: ,,, | Performed by: INTERNAL MEDICINE

## 2025-01-24 PROCEDURE — 25000003 PHARM REV CODE 250: Performed by: EMERGENCY MEDICINE

## 2025-01-24 PROCEDURE — 85027 COMPLETE CBC AUTOMATED: CPT | Performed by: NURSE PRACTITIONER

## 2025-01-24 PROCEDURE — A4216 STERILE WATER/SALINE, 10 ML: HCPCS | Performed by: EMERGENCY MEDICINE

## 2025-01-24 PROCEDURE — 25000003 PHARM REV CODE 250: Performed by: HOSPITALIST

## 2025-01-24 PROCEDURE — 63600175 PHARM REV CODE 636 W HCPCS: Performed by: NURSE PRACTITIONER

## 2025-01-24 PROCEDURE — 97530 THERAPEUTIC ACTIVITIES: CPT

## 2025-01-24 PROCEDURE — 80048 BASIC METABOLIC PNL TOTAL CA: CPT | Performed by: NURSE PRACTITIONER

## 2025-01-24 PROCEDURE — 99232 SBSQ HOSP IP/OBS MODERATE 35: CPT | Mod: ,,, | Performed by: PHYSICIAN ASSISTANT

## 2025-01-24 PROCEDURE — 85007 BL SMEAR W/DIFF WBC COUNT: CPT | Performed by: NURSE PRACTITIONER

## 2025-01-24 PROCEDURE — 25000003 PHARM REV CODE 250: Performed by: STUDENT IN AN ORGANIZED HEALTH CARE EDUCATION/TRAINING PROGRAM

## 2025-01-24 PROCEDURE — 82330 ASSAY OF CALCIUM: CPT | Performed by: INTERNAL MEDICINE

## 2025-01-24 PROCEDURE — 97110 THERAPEUTIC EXERCISES: CPT

## 2025-01-24 PROCEDURE — 82533 TOTAL CORTISOL: CPT | Performed by: NURSE PRACTITIONER

## 2025-01-24 PROCEDURE — 36415 COLL VENOUS BLD VENIPUNCTURE: CPT | Performed by: NURSE PRACTITIONER

## 2025-01-24 PROCEDURE — 63600175 PHARM REV CODE 636 W HCPCS: Performed by: EMERGENCY MEDICINE

## 2025-01-24 PROCEDURE — 25000003 PHARM REV CODE 250: Performed by: PHYSICIAN ASSISTANT

## 2025-01-24 RX ORDER — PREDNISONE 20 MG/1
20 TABLET ORAL 2 TIMES DAILY
Status: DISCONTINUED | OUTPATIENT
Start: 2025-01-24 | End: 2025-01-29 | Stop reason: HOSPADM

## 2025-01-24 RX ORDER — PREDNISONE 20 MG/1
40 TABLET ORAL DAILY
Status: DISCONTINUED | OUTPATIENT
Start: 2025-01-24 | End: 2025-01-24

## 2025-01-24 RX ORDER — ALLOPURINOL 100 MG/1
100 TABLET ORAL DAILY
Status: DISCONTINUED | OUTPATIENT
Start: 2025-01-24 | End: 2025-01-29 | Stop reason: HOSPADM

## 2025-01-24 RX ADMIN — PANTOPRAZOLE SODIUM 40 MG: 40 TABLET, DELAYED RELEASE ORAL at 09:01

## 2025-01-24 RX ADMIN — MUPIROCIN: 20 OINTMENT TOPICAL at 08:01

## 2025-01-24 RX ADMIN — GABAPENTIN 300 MG: 300 CAPSULE ORAL at 08:01

## 2025-01-24 RX ADMIN — INSULIN GLARGINE 10 UNITS: 100 INJECTION, SOLUTION SUBCUTANEOUS at 08:01

## 2025-01-24 RX ADMIN — Medication 10 ML: at 06:01

## 2025-01-24 RX ADMIN — GABAPENTIN 300 MG: 300 CAPSULE ORAL at 02:01

## 2025-01-24 RX ADMIN — INSULIN ASPART 6 UNITS: 100 INJECTION, SOLUTION INTRAVENOUS; SUBCUTANEOUS at 06:01

## 2025-01-24 RX ADMIN — MYCOPHENOLATE MOFETIL 500 MG: 500 TABLET, FILM COATED ORAL at 08:01

## 2025-01-24 RX ADMIN — MUPIROCIN: 20 OINTMENT TOPICAL at 09:01

## 2025-01-24 RX ADMIN — Medication 10 ML: at 10:01

## 2025-01-24 RX ADMIN — PREDNISONE 20 MG: 20 TABLET ORAL at 08:01

## 2025-01-24 RX ADMIN — TACROLIMUS 3 MG: 1 CAPSULE ORAL at 08:01

## 2025-01-24 RX ADMIN — NYSTATIN 500000 UNITS: 100000 SUSPENSION ORAL at 05:01

## 2025-01-24 RX ADMIN — Medication 10 ML: at 02:01

## 2025-01-24 RX ADMIN — TACROLIMUS 3 MG: 1 CAPSULE ORAL at 05:01

## 2025-01-24 RX ADMIN — HYDROCODONE BITARTRATE AND ACETAMINOPHEN 1 TABLET: 5; 325 TABLET ORAL at 12:01

## 2025-01-24 RX ADMIN — ALLOPURINOL 100 MG: 100 TABLET ORAL at 02:01

## 2025-01-24 RX ADMIN — MYCOPHENOLATE MOFETIL 500 MG: 500 TABLET, FILM COATED ORAL at 09:01

## 2025-01-24 RX ADMIN — LIDOCAINE 5% 2 PATCH: 700 PATCH TOPICAL at 09:01

## 2025-01-24 RX ADMIN — PREDNISONE 20 MG: 20 TABLET ORAL at 09:01

## 2025-01-24 RX ADMIN — NYSTATIN 500000 UNITS: 100000 SUSPENSION ORAL at 09:01

## 2025-01-24 RX ADMIN — NYSTATIN 500000 UNITS: 100000 SUSPENSION ORAL at 02:01

## 2025-01-24 RX ADMIN — METOPROLOL SUCCINATE 25 MG: 25 TABLET, FILM COATED, EXTENDED RELEASE ORAL at 08:01

## 2025-01-24 RX ADMIN — INSULIN ASPART 4 UNITS: 100 INJECTION, SOLUTION INTRAVENOUS; SUBCUTANEOUS at 05:01

## 2025-01-24 RX ADMIN — CEFTRIAXONE 1 G: 1 INJECTION, POWDER, FOR SOLUTION INTRAMUSCULAR; INTRAVENOUS at 02:01

## 2025-01-24 RX ADMIN — INSULIN ASPART 3 UNITS: 100 INJECTION, SOLUTION INTRAVENOUS; SUBCUTANEOUS at 09:01

## 2025-01-24 RX ADMIN — TAMSULOSIN HYDROCHLORIDE 0.4 MG: 0.4 CAPSULE ORAL at 09:01

## 2025-01-24 RX ADMIN — INSULIN GLARGINE 10 UNITS: 100 INJECTION, SOLUTION SUBCUTANEOUS at 09:01

## 2025-01-24 RX ADMIN — PANTOPRAZOLE SODIUM 40 MG: 40 TABLET, DELAYED RELEASE ORAL at 08:01

## 2025-01-24 RX ADMIN — GABAPENTIN 300 MG: 300 CAPSULE ORAL at 09:01

## 2025-01-24 RX ADMIN — INSULIN ASPART 6 UNITS: 100 INJECTION, SOLUTION INTRAVENOUS; SUBCUTANEOUS at 11:01

## 2025-01-24 NOTE — CONSULTS
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 RNA NEGATIVE 2015, ,     History of colon polyps     HPTH (hyperparathyroidism)     Hyperlipidemia     Hypertension associated with stage 3 chronic kidney disease due to type 2 diabetes mellitus     LBBB (left bundle branch block) 2021    Morbid obesity with BMI of 45.0-49.9, adult     Nephrolithiasis 2013    PCO (posterior capsular opacification), left 2019    Proteinuria     resolved s/p transplant    S/P kidney transplant     Sleep apnea     Type 2 diabetes, uncontrolled, with retinopathy     Type II diabetes mellitus with renal manifestations        Social History     Socioeconomic History    Marital status: Legally     Number of children: 2   Occupational History    Occupation: retired     Employer: Retired   Tobacco Use    Smoking status: Former     Current packs/day: 0.00     Types: Cigarettes     Quit date: 2013     Years since quittin.6     Passive exposure: Past    Smokeless tobacco: Former     Quit date: 2013    Tobacco  comments:     used marijuana since [id number], stopped after started dialysis   Substance and Sexual Activity    Alcohol use: No     Alcohol/week: 0.0 standard drinks of alcohol    Drug use: Not Currently     Comment:      Sexual activity: Never   Social History Narrative    . Lives with spouse. Has 2 children. Patient retired as  for [employer]. He has been washing cars.     Social Drivers of Health     Financial Resource Strain: Patient Declined (1/13/2025)    Overall Financial Resource Strain (CARDIA)     Difficulty of Paying Living Expenses: Patient declined   Food Insecurity: Patient Declined (1/13/2025)    Hunger Vital Sign     Worried About Running Out of Food in the Last Year: Patient declined     Ran Out of Food in the Last Year: Patient declined   Transportation Needs: Patient Unable To Answer (1/13/2025)    TRANSPORTATION NEEDS     Transportation : Patient unable to answer   Stress: Patient Declined (1/13/2025)    [employer] - Occupational Stress Questionnaire     Feeling of Stress : Patient declined   Housing Stability: Patient Declined (1/13/2025)    Housing Stability Vital Sign     Unable to Pay for Housing in the Last Year: Patient declined     Homeless in the Last Year: Patient declined       Precautions:     Allergies as of 01/12/2025 - Reviewed 01/12/2025   Allergen Reaction Noted    Lisinopril Other (See Comments) 04/12/2013    Actos  [pioglitazone] Other (See Comments) 04/12/2013    Metformin Other (See Comments) 04/12/2013       [facility] Assessment Details/Treatment         Re-consulted to evaluate wound to R heel.  I last saw patient 1/13/25 Re:  wounds to BLE's and MASD to gluteal cleft.  Today, patient resting quietly in bariatric specialty bed w/ low air-loss mattress.  Denies any c/o @ present.  Removed offloading boots and Tubigrip tubular dressings from BLE's.  ---Noted venous ulcer to RLE (lateral) now resolved.  Periwound intact.  No  exudate noted.  ---Noted venous ulcer to LLE (anterior) now resolved.  Periwound intact.  No exudate noted.  ---Evaluated B heels---noted no redness, discoloration or skin breakdown @ this time.  No open wound noted.  No exudate noted.  Skin intact.  Recommend:  continue Tubigrip tubular dressings to BLE's.  Okay for nursing staff to remove daily with bath and replace.  Will continue offloading B heels using offloading heel boots @ all times when patient is in bed.  Assisted to turn patient onto his L side.  Removed foam border dressing from sacrum and cleansed skin using soft bath cloth.  Noted no open wound to sacrum or coccyx.    --Noted MASD to R gluteal cleft is now resolved.  No exudate noted.  No open wound noted.  Recommend:  barrier paste to be applied daily after bath & prn each episode of incontinence.  Will sign-off for now.  Please re-consult as needed for any further wound care needs.       01/23/25 1620   WOCN Assessment   WOCN Total Time (mins) 45   Visit Date 01/23/25   Visit Time 1620   Consult Type New   WOCN Speciality Wound   Intervention assessed;applied;chart review;coordination of care   Teaching on-going   Skin Interventions   Device Skin Pressure Protection absorbent pad utilized/changed   Pressure Reduction Devices specialty bed utilized;heel offloading device utilized   Pressure Reduction Techniques weight shift assistance provided;heels elevated off bed   Skin Protection incontinence pads utilized   Positioning   Body Position turned;head facing, left   Head of Bed (HOB) Positioning HOB elevated   Positioning/Transfer Devices wedge;in use;pillows   Pressure Injury Prevention    Check Moisture Management Pad Done   Sacral Foam Dressing Remove   Heel protection technique Heel boot   Check Medical Devices Done   [REMOVED]      Altered Skin Integrity 12/01/23 0948 Left anterior;lower Leg   Final Assessment Date/Final Assessment Time: 01/23/25 1620  Date First Assessed/Time First Assessed:  12/01/23 0948   Altered Skin Integrity Present on Admission - Did Patient arrive to the hospital with altered skin?: yes  Side: Left  Orientation: ant...   Wound Image    Appearance Closed/resurfaced   [REMOVED]      Wound 01/12/25 1713 Moisture associated dermatitis Right Gluteal cleft   Final Assessment Date/Final Assessment Time: 01/23/25 1620  Date First Assessed/Time First Assessed: 01/12/25 1713   Present on Original Admission: Yes  Primary Wound Type: Moisture associated dermatitis  Side: Right  Location: Gluteal cleft  Wound Ou...   Wound Image    Appearance Closed/resurfaced   [REMOVED]      Wound 01/13/25 1040 Venous Ulcer Right lower;lateral Leg   Final Assessment Date/Final Assessment Time: 01/23/25 1620  Date First Assessed/Time First Assessed: 01/13/25 1040   Present on Original Admission: Yes  Primary Wound Type: Venous Ulcer  Side: Right  Orientation: lower;lateral  Location: Leg  Wound Ou...   Wound Image    Appearance Closed/resurfaced   [REMOVED]      Wound 01/21/25 1851 Pressure Injury Right Heel   Final Assessment Date/Final Assessment Time: 01/23/25 1620  Date First Assessed/Time First Assessed: 01/21/25 1851   Present on Original Admission: No  Primary Wound Type: Pressure Injury  Side: Right  Location: Heel  Is this injury device related?: N...   Wound Image    Appearance Intact         01/23/2025

## 2025-01-24 NOTE — PLAN OF CARE
Pt BM extremely limited due to pain levels in RT UE and LT LE indicated by patient. Noted that any movement caused patient to exhibit increase levels of pain.

## 2025-01-24 NOTE — PLAN OF CARE
HECTOR contacted [facility] to check status of 142. PASRR was not received when faxed earlier today. HECTOR faxed PASRR to office. SW to follow up on Monday

## 2025-01-24 NOTE — PROGRESS NOTES
Inpatient Nutrition Assessment    Admit Date: 1/12/2025   Total duration of encounter: 12 days   Patient Age: 69 y.o.    Nutrition Recommendation/Prescription     Consider liberalizing to diabetic diet if intake remains </= 50% at meals. Encourage intake.  Continue Suplena supplements for additional kcal. Encourage intake.   Weigh patient/update weight recording.   Last documented BM 1/18/25. Consider scheduled bowel regimen if accurate.   Monitor % intake meals + supplements, weight, I/O, labs/BG trends.    Communication of Recommendations:  EMR    Nutrition Assessment     Malnutrition Assessment/Nutrition-Focused Physical Exam       Malnutrition Level: other (see comments) (unable to determine) (01/22/25 1339)  Energy Intake (Malnutrition): less than 75% for greater than 7 days (per flowsheet documentation) (01/22/25 1339)  Weight Loss (Malnutrition): other (see comments) (unable to assess) (01/22/25 1339)  Subcutaneous Fat (Malnutrition): other (see comments) (unable to assess - dieititan working remotely) (01/22/25 6209)           Muscle Mass (Malnutrition): other (see comments) (unable to assess - dietitian working remotely) (01/22/25 1339)                          Fluid Accumulation (Malnutrition): moderate (2-3+ edema per flowsheets) (01/22/25 1339)        A minimum of two characteristics is recommended for diagnosis of either severe or non-severe malnutrition.    Chart Review    Reason Seen: follow-up    Malnutrition Screening Tool Results   Have you recently lost weight without trying?: Yes: 2-13 lbs  Have you been eating poorly because of a decreased appetite?: No   MST Score: 1   Diagnosis:  Severe physical deconditioning  Tacrolimus-induced GI toxicity  Gross hematuria  CKD Stage 4, GFR 15-29 ml/min  ABL anemia plus anemia of CKD 4    Relevant Medical History: CHF, anemia a/w chronic renal failure, hyperparathyroidism, type 2 DM with renal manifestations, hypertension, ESRD on HD (prior to transplant),  HLD, CAD, chronic immunosuppression, s/p kidney transplant (2015), hepatitis C, sleep apnea, morbid obesity    Scheduled Medications:  cefTRIAXone (Rocephin) IV (PEDS and ADULTS), 1 g, Q24H  gabapentin, 300 mg, TID  insulin glargine U-100, 10 Units, BID  LIDOcaine, 2 patch, Q24H  metoprolol succinate, 25 mg, Daily  mupirocin, , BID  mycophenolate, 500 mg, BID  nystatin, 500,000 Units, QID  pantoprazole, 40 mg, BID  predniSONE, 20 mg, BID  sodium chloride 0.9%, 10 mL, Q8H  tacrolimus, 3 mg, BID  tamsulosin, 0.4 mg, QHS    Continuous Infusions:   PRN Medications:  0.9%  NaCl infusion (for blood administration), , Q24H PRN  acetaminophen, 650 mg, Q4H PRN  dextrose 50%, 12.5 g, PRN  dextrose 50%, 25 g, PRN  glucagon (human recombinant), 1 mg, PRN  glucose, 16 g, PRN  glucose, 24 g, PRN  HYDROcodone-acetaminophen, 1 tablet, Q6H PRN  insulin aspart U-100, 0-10 Units, QID (AC + HS) PRN  melatonin, 6 mg, Nightly PRN  metoprolol, 5 mg, Q6H PRN  naloxone, 0.02 mg, PRN  ondansetron, 8 mg, Q8H PRN  ondansetron, 4 mg, Q8H PRN  polyethylene glycol, 17 g, Daily PRN  senna-docusate 8.6-50 mg, 2 tablet, Daily PRN    Calorie Containing IV Medications: no significant kcals from medications at this time    Recent Labs   Lab 01/17/25  1207 01/18/25  0526 01/19/25  0611 01/20/25  0458 01/21/25  0547 01/22/25  0614 01/23/25  0449 01/24/25  0446    142 141 139  139 137 136 136 141   K 4.5 4.0 4.1 4.4  4.4 4.4 4.3 4.4 4.5   CALCIUM 9.6 9.6 9.7 9.7  9.7 9.6 9.6 9.4 9.9   PHOS  --   --   --  5.7*  --  4.6*  --   --    MG  --   --   --   --   --   --  1.9 1.8   * 111* 109 108  108 105 106 106 110   CO2 21* 21* 18* 17*  17* 19* 19* 19* 22*   BUN 43* 41* 43* 50*  50* 52* 56* 55* 49*   CREATININE 2.2* 2.1* 2.3* 2.5*  2.5* 2.4* 2.2* 2.1* 1.9*   EGFRNORACEVR 31* 33* 30* 27*  27* 28* 31* 33* 37*   BILITOT  --   --   --   --   --   --  0.4  --    ALKPHOS  --   --   --   --   --   --  160*  --    ALT  --   --   --   --   --   --   16  --    AST  --   --   --   --   --   --  16  --    ALBUMIN  --   --   --  1.2*  --  1.3* 1.3*  --    WBC 3.03* 2.85* 3.21* 2.94*  2.94*  --   --  2.97* 3.00*   HGB 8.7* 8.4* 8.6* 8.3*  8.3*  --   --  8.2* 8.7*   HCT 29.1* 28.7* 29.8* 29.1*  29.1*  --   --  28.2* 30.3*     Nutrition Orders:  Diet Renal Minced & Moist (IDDSI Level 5), Renal; Standard Tray  Dietary nutrition supplements Other (see comments)    Appetite/Oral Intake: fair/25-50% of meals  Factors Affecting Nutritional Intake:  unable to obtain  Social Needs Impacting Access to Food: unable to assess at this time; will attempt on follow-up  Food/Yarsani/Cultural Preferences: none reported  Food Allergies: no known food allergies  Last Bowel Movement: 01/18/25  Wound(s):[REMOVED]      Wound 01/13/25 1040 Venous Ulcer Right lower;lateral Leg-Tissue loss description: Partial thickness  [REMOVED]      Wound 01/21/25 1851 Pressure Injury Right Heel-Tissue loss description: Not applicable  [REMOVED]      Wound 01/12/25 1713 Moisture associated dermatitis Right Gluteal cleft-Tissue loss description: Partial thickness  [REMOVED]      Altered Skin Integrity 12/01/23 0948 Left anterior;lower Leg-Tissue loss description: Partial thickness     Comments    1/22/25: Dietitian working remotely. Attempted to reach pt by phone without success. Currently on Renal, minced and moist diet with intake 0-25% of most meals since admit per flowsheets. 2-13 lb weight loss PTA reported on MST. Of note, was on Ozempic per home med list. Last documented BM 1/18/25 - has PRN bowel regimen ordered but has not received any doses per MAR.     1/24/25: Dietitian working remotely. Attempted to reach pt by phone without success. Remains on Renal, minced and moist (Level 5) diet - intake appears to have improved to 50% since last RD assessment per flowsheet documentation. Added Suplena for additional kcal after last nutrition assessment. Reached out to nursing regarding intake of  "meals and supplements - response pending. Last documented BM remains 1/18/25 - still has not received any doses of PRN bowel regimen per MAR. No new weight since last RD assessment.    Anthropometrics    Height: 5' 7" (170.2 cm), Height Method: Stated  Last Weight: 130.2 kg (287 lb 0.6 oz) (01/15/25 2335), Weight Method: Bed Scale  BMI (Calculated): 44.9  BMI Classification: obese grade III (BMI >/=40)        Ideal Body Weight (IBW), Male: 148 lb                                Usual Weight Provided By: EMR weight history    Wt Readings from Last 5 Encounters:   01/15/25 130.2 kg (287 lb 0.6 oz)   12/30/24 120.7 kg (265 lb 15.8 oz)   12/20/24 123 kg (271 lb 2.7 oz)   12/10/24 122 kg (268 lb 15.4 oz)   11/26/24 120.7 kg (266 lb)     Weight Change(s) Since Admission:   Wt Readings from Last 1 Encounters:   01/15/25 2335 130.2 kg (287 lb 0.6 oz)   01/14/25 0442 128.7 kg (283 lb 11.7 oz)   01/12/25 1713 126 kg (277 lb 12.5 oz)   01/12/25 0619 130.1 kg (286 lb 13.1 oz)   Admit Weight: 130.1 kg (286 lb 13.1 oz) (01/12/25 0619), Weight Method: Bed Scale    Estimated Needs    Weight Used For Calorie Calculations: 67.1 kg (148 lb) (using IBW d/t edema)  Energy Calorie Requirements (kcal): 2447-1122 kcal (25-30 kcal/kg)  Energy Need Method: Kcal/kg  Weight Used For Protein Calculations: 67.1 kg (148 lb) (using IBW d/t edema)  Protein Requirements: 53-67 g (0.8-1.0 g/kg IBW)  Fluid Requirements (mL): 3063-5892 mL or per MD  CHO Requirement: 209-251 g (50% estimated needs)     Enteral Nutrition     Patient not receiving enteral nutrition at this time.    Parenteral Nutrition     Patient not receiving parenteral nutrition support at this time.    Evaluation of Received Nutrient Intake    Calories: not meeting estimated needs  Protein: not meeting estimated needs    Patient Education     Not applicable.    Nutrition Diagnosis     PES: Inadequate energy intake related to inability to consume sufficient nutrients as evidenced by % " intake documented. (active)       Nutrition Interventions     Intervention(s): modified composition of meals/snacks, commercial beverage, and collaboration with other providers    Goal: Consume % of meals/snacks by follow-up. (goal progressing)  Goal: Consume % of oral supplements by follow-up. (goal progressing)    Nutrition Goals & Monitoring     Dietitian will monitor: energy intake, weight, electrolyte/renal panel, glucose/endocrine profile, and gastrointestinal profile  Discharge planning:  Renal, diabetic diet with texture modifications per SLP with Suplena supplements PRN  Nutrition Risk/Follow-Up: moderate (follow-up in 3-5 days)   Please consult if re-assessment needed sooner.

## 2025-01-24 NOTE — PLAN OF CARE
SW spoke with spouse regarding SNF status. Only accepting option [facility]. Spouse inquired about other options. SW advised there are three facilities that have not  yet responded: [facility] and [facility]. SW to follow up with facilities regarding decision.    110PM - Remaining facilities responded and have denied pt's referral d/t activity tolerance or cost of care. SW left VM for spouse with update. SW to make room visit to f/u as well.

## 2025-01-24 NOTE — PLAN OF CARE
Problem: Adult Inpatient Plan of Care  Goal: Plan of Care Review  Outcome: Progressing  Goal: Patient-Specific Goal (Individualized)  Outcome: Progressing  Goal: Absence of Hospital-Acquired Illness or Injury  Outcome: Progressing  Goal: Optimal Comfort and Wellbeing  Outcome: Progressing  Goal: Readiness for Transition of Care  Outcome: Progressing     Problem: Bariatric Environmental Safety  Goal: Safety Maintained with Care  Outcome: Progressing     Problem: Diabetes Comorbidity  Goal: Blood Glucose Level Within Targeted Range  Outcome: Progressing     Problem: Wound  Goal: Optimal Coping  Outcome: Progressing  Goal: Optimal Functional Ability  Outcome: Progressing  Goal: Absence of Infection Signs and Symptoms  Outcome: Progressing  Goal: Improved Oral Intake  Outcome: Progressing  Goal: Optimal Pain Control and Function  Outcome: Progressing  Goal: Skin Health and Integrity  Outcome: Progressing  Goal: Optimal Wound Healing  Outcome: Progressing     Problem: Skin Injury Risk Increased  Goal: Skin Health and Integrity  Outcome: Progressing     Problem: Pain Acute  Goal: Optimal Pain Control and Function  Outcome: Progressing     Problem: Infection  Goal: Absence of Infection Signs and Symptoms  Outcome: Progressing     Problem: Fall Injury Risk  Goal: Absence of Fall and Fall-Related Injury  Outcome: Progressing

## 2025-01-24 NOTE — PLAN OF CARE
Nutrition Recommendations/Interventions on 1/24/25:   Consider liberalizing to diabetic diet if intake remains </= 50% at meals. Encourage intake.  Continue Suplena supplements for additional kcal. Encourage intake.   Weigh patient/update weight recording.   Last documented BM 1/18/25. Consider scheduled bowel regimen if accurate.   Monitor % intake meals + supplements, weight, I/O, labs/BG trends.     Goals:   Goal: Consume % of meals/snacks by follow-up. (goal progressing)  Goal: Consume % of oral supplements by follow-up. (goal progressing)     Thuy Faust, RD, LDN, CNSC

## 2025-01-24 NOTE — ASSESSMENT & PLAN NOTE
01/17/2025  Patient requiring max assists with bed transitions and feeding. High intensity therapy recommended by therapists. Patient previously independent, living at home. Referrals for rehab placed at this time.      01/18/2025  Some notable objective clinical improvement. Reports decreased ROM in UE bilaterally but predominantly his right UE. Radiography of the left hand and R shoulder unremarkable. Will obtain CT cervical spine to further assess for stenosis.     01/19/2025  Cervical spine CT shows right moderate foraminal stenosis at C3-4 and C5-6 secondary to spurring of the uncovertebral joints.  Will obtain MRI of spine and consult orthospine to assess to see if findings could be contributing to his significant UE weakness and pain.     1/20/2025  Patient's daughter tells me patient independently prior to hospitalization. Patient complains of upper and lower bilateral weakness. While Prograf toxicity could play a part in progressive decline, he will need additional work up. CT head unremarkable. Kidney function has returned to baseline.  Vitamin B12 pending. Neurosurgery/Neurology consult pending. He is awaiting rehab vs SNF placement.     1/21/2025  Neurology input appreciated  Follow Vitamin B12, Thiamine, Copper  Continue PT/OT  Awaiting SNF consultation    1/23/25  S/p knee aspiration today.   Cell count unremarkable  Awaiting gram stain, crystals, and culture  Continue PT/TO.   SNF  placement pending    1/24/25  Treat gout  Awaiting SNF   Home

## 2025-01-24 NOTE — PROGRESS NOTES
[facility])  Orthopedics  Progress Note    Patient Name: Mitch Whittaker  MRN: [id number]  Admission Date: 1/12/2025  Hospital Length of Stay: 12 days  Attending Provider: Carlos Mathew MD  Primary Care Provider: Valery Caal MD    Subjective:     Principal Problem:Physical deconditioning    Principal Orthopedic Problem:  Right knee effusion    Interval History: Mitch Whittaker is a 69 y.o. male admitted for physical deconditioning and Orthopedics has been consulted for right knee effusion.  Dr. Le drained the knee yesterday and sent the fluid to the lab for analysis.  Lab analysis shows no concerns for infection and confirmed gout.  Patient states that the knee is feeling much better after the knee was aspirated.  No new complaints at this time.    Review of patient's allergies indicates:   Allergen Reactions    Lisinopril Other (See Comments)     Other reaction(s):  cough    Actos  [pioglitazone] Other (See Comments)     Other reaction(s): CHF    Metformin Other (See Comments)     Other reaction(s): renal insuff  Other reaction(s): CHF       Current Facility-Administered Medications   Medication    0.9%  NaCl infusion (for blood administration)    acetaminophen tablet 650 mg    cefTRIAXone injection 1 g    dextrose 50% injection 12.5 g    dextrose 50% injection 25 g    gabapentin capsule 300 mg    glucagon (human recombinant) injection 1 mg    glucose chewable tablet 16 g    glucose chewable tablet 24 g    HYDROcodone-acetaminophen 5-325 mg per tablet 1 tablet    insulin aspart U-100 pen 0-10 Units    insulin glargine U-100 (Lantus) pen 10 Units    LIDOcaine 5 % patch 2 patch    melatonin tablet 6 mg    metoprolol injection 5 mg    metoprolol succinate (TOPROL-XL) 24 hr tablet 25 mg    mupirocin 2 % ointment    mycophenolate tablet 500 mg    naloxone 0.4 mg/mL injection 0.02 mg    nystatin 100,000 unit/mL suspension 500,000 Units    ondansetron disintegrating tablet 8 mg    ondansetron  "injection 4 mg    pantoprazole EC tablet 40 mg    polyethylene glycol packet 17 g    predniSONE tablet 20 mg    senna-docusate 8.6-50 mg per tablet 2 tablet    sodium chloride 0.9% flush 10 mL    tacrolimus capsule 3 mg    tamsulosin 24 hr capsule 0.4 mg     Objective:     Vital Signs (Most Recent):  Temp: 98.3 °F (36.8 °C) (01/24/25 0852)  Pulse: 94 (01/24/25 0855)  Resp: 18 (01/24/25 0852)  BP: (!) 120/58 (01/24/25 0852)  SpO2: 97 % (01/24/25 0852) Vital Signs (24h Range):  Temp:  [97.3 °F (36.3 °C)-98.3 °F (36.8 °C)] 98.3 °F (36.8 °C)  Pulse:  [77-94] 94  Resp:  [18-20] 18  SpO2:  [92 %-99 %] 97 %  BP: (114-137)/(58-78) 120/58     Weight: 130.2 kg (287 lb 0.6 oz)  Height: 5' 7" (170.2 cm)  Body mass index is 44.96 kg/m².      Intake/Output Summary (Last 24 hours) at 1/24/2025 0916  Last data filed at 1/24/2025 0844  Gross per 24 hour   Intake 200 ml   Output 1300 ml   Net -1100 ml       Ortho/SPM Exam  Right knee:   Small effusion   Mild edema diffusely   Mild to moderate TTP at the joint line   Calf and compartments are soft and compressible   Motor exam normal   Sensation and pulses intact   Cap refill brisk    GEN: Well developed, well nourished male. AAOX3. No acute distress.   Head: Normocephalic, atraumatic.   Eyes: LLUVIA  Neck: Trachea is midline, no adenopathy  Resp: Breathing unlabored.  Neuro: Motor function normal, Cranial nerves intact  Psych: Mood and affect appropriate.      Significant Labs:   Recent Lab Results  (Last 5 results in the past 24 hours)        01/24/25  0617   01/24/25  0446   01/23/25  2003   01/23/25  1610   01/23/25  1200        WBC, Body Fluid         600  Comment: Reference ranges for body fluids not established.   Correlate clinically.         Lymphs, Fluid         8       Segs, Fluid         86       Body Fluid Type         Joint Fluid, Right Knee       Monocytes/Macrophages, Fluid         6       Body Fluid Source, Crystal Exam         Joint Fluid, Right Knee       Body Fluid " Crystal         Positive  Comment: Intra and extracellular urate crystals present       Pathologist Review, Body Fluid         REVIEWED  Comment:   Electronically reviewed and signed by:  Taryn Dunn MD  Signed on 01/24/25 at 08:06  Rare extracellular urate crystals present         Anion Gap   9             Aniso   Slight             Bands   2.0             Baso #   CANCELED  Comment: Result canceled by the ancillary.             Basophil %   1.0             BUN   49             Calcium   9.9             Chloride   110             CO2   22             Creatinine   1.9             Differential Method   Manual             eGFR   37             Eos #   CANCELED  Comment: Result canceled by the ancillary.             Eos %   1.0             Fluid Appearance         Turbid       Fluid Color         Nikki       Glucose   219             Gran %   51.0             Hematocrit   30.3             Hemoglobin   8.7             Hypo   Occasional             Immature Grans (Abs)   CANCELED  Comment: Mild elevation in immature granulocytes is non specific and   can be seen in a variety of conditions including stress response,   acute inflammation, trauma and pregnancy. Correlation with other   laboratory and clinical findings is essential.    Result canceled by the ancillary.               Immature Granulocytes   CANCELED  Comment: Result canceled by the ancillary.             Lymph #   CANCELED  Comment: Result canceled by the ancillary.             Lymph %   23.0             MCH   24.2             MCHC   28.7             MCV   84             Metamyelocytes   2.0             Mono #   CANCELED  Comment: Result canceled by the ancillary.             Mono %   14.0             MPV   10.8             Myelocytes   6.0             nRBC   1             Ovalocytes   Occasional             Platelet Estimate   Appears normal             Platelet Count   375             POCT Glucose 217     285   244         Poikilocytosis   Slight              Potassium   4.5             RBC   3.60             RDW   14.2             Sodium   141             Teardrop Cells   Occasional             Uric Acid   11.1             WBC   3.00                                  All pertinent labs within the past 24 hours have been reviewed.    Significant Imaging: I have reviewed and interpreted all pertinent imaging results/findings.    Assessment/Plan:     Active Diagnoses:    Diagnosis Date Noted POA    PRINCIPAL PROBLEM:  Severe physical deconditioning [R53.81] 01/18/2025 No    Urinary retention [R33.9] 01/23/2025 Yes    Right knee pain [M25.561] 01/23/2025 Yes    Gross hematuria [R31.0] 01/13/2025 Yes    Tacrolimus-induced GI toxicity [K63.89, T45.1X5A] 01/13/2025 Yes    CKD (chronic kidney disease) stage 4, GFR 15-29 ml/min [N18.4] 06/24/2024 Yes    ABL Anemia plus anemia of CKD 4 [N18.9, D63.1]  Yes      Problems Resolved During this Admission:    Diagnosis Date Noted Date Resolved POA    Hyperkalemia [E87.5] 01/13/2025 01/20/2025 Yes    Melena [K92.1] 01/13/2025 01/16/2025 Yes       Assessment:  69 y.o. male admitted for severe physical deconditioning and orthopedics is following for right knee effusion, right knee gouty arthritis confirmed by laboratory analysis of fluid from aspiration    Plan:  Reviewed the lab results with the patient   Encouraged him that there are no signs of infection   Recommend management of gout per primary team   Explained to the patient that this is condition that can be managed through dietary changes as well as medication and I recommended that he follow up with his primary care provider following discharge for further education and management  No indication for surgical intervention at this time     Charles Roland PA-C  Orthopedics  [facility] ([facility])

## 2025-01-24 NOTE — PT/OT/SLP PROGRESS
Occupational Therapy   Treatment    Name: Mitch Whittaker  MRN: [id number]  Admitting Diagnosis:  Physical deconditioning       Recommendations:     Discharge Recommendations: Moderate Intensity Therapy  Discharge Equipment Recommendations:  to be determined by next level of care  Barriers to discharge:  Decreased caregiver support    Assessment:     Mitch Whittaker is a 69 y.o. male with a medical diagnosis of Physical deconditioning.  He presents with the following. Performance deficits affecting function are weakness, impaired endurance, impaired self care skills, impaired functional mobility, gait instability, impaired balance, decreased coordination, decreased upper extremity function, decreased lower extremity function, pain, decreased ROM, impaired coordination, impaired fine motor.     Rehab Prognosis:  Poor; patient would benefit from acute skilled OT services to address these deficits and reach maximum level of function.       Plan:     Patient to be seen 2 x/week to address the above listed problems via self-care/home management, therapeutic activities, therapeutic exercises  Plan of Care Expires: 01/31/25  Plan of Care Reviewed with: patient    Subjective     Chief Complaint: Pain in RT UE and LT LE  Patient/Family Comments/goals: increase BM  Pain/Comfort:  Pain Rating 1: 9/10  Location - Side 1: Right  Location - Orientation 1: generalized  Location 1: arm  Pain Addressed 1: Reposition  Pain Rating Post-Intervention 1: 9/10  Pain Rating 2: 9/10  Location - Side 2: Left  Location - Orientation 2: generalized  Location 2: leg  Pain Addressed 2: Reposition  Pain Rating Post-Intervention 2: 9/10    Objective:     Communicated with: Kathie prior to session.  Patient found HOB elevated with peripheral IV, pressure relief boots, telemetry upon OT entry to room.    General Precautions: Standard, fall    Orthopedic Precautions:N/A  Braces: N/A  Respiratory Status: Room air     Occupational Performance:     Bed  Mobility:    BM limited due to Pt experiencing high levels of pain with any movement.   Pt tolerated min PROM from therapist in UE and LE    Functional Mobility/Transfers:  Unable to perform transfers or FM    Activities of Daily Living:  Unable to participate in ADLs due to pain      [facility] 6 Click ADL: 8    Treatment & Education:  Encouraged completion of B UE AROM therex throughout the day to increase functional strength and activity tolerance needed for ADL completion.  Pt encouraged actively move UE and LE throughout day to reduce pain in stiff joints.    OT role, plan of care, progression of goals, importance of continued OOB activity, ADL/functional transfer and mobility retraining, call don't fall, safety precautions, fall prevention. Pt educated on sitting in chair for 1 hour during breakfast lunch and dinner in order to change positions, regulate BP and reduce bed sores.   Pt acknowledges and agrees with POC given by OT.      Patient left HOB elevated with all lines intact, call button in reach, and bed alarm on    GOALS:   Multidisciplinary Problems       Occupational Therapy Goals          Problem: Occupational Therapy    Goal Priority Disciplines Outcome Interventions   Occupational Therapy Goal     OT, PT/OT Not Progressing    Description: Goals to be met by: 1/31/25     Patient will increase functional independence with ADLs by performing:    UE Dressing with Minimal Assistance.  Sitting at edge of bed x15 minutes with Stand-by Assistance.  Rolling to Bilateral with Minimal Assistance.   Supine to sit with Minimal Assistance.                           Time Tracking:     OT Date of Treatment: 01/24/25  OT Start Time: 0955  OT Stop Time: 1010  OT Total Time (min): 15 min    Billable Minutes:Therapeutic Activity 15    OT/ANNIA: ANNIA     Number of ANNIA visits since last OT visit: 2  ZABRINA Mancilla    1/24/2025

## 2025-01-24 NOTE — PT/OT/SLP PROGRESS
Physical Therapy Treatment    Patient Name:  Mitch Whittaker   MRN:  [id number]    Recommendations:     Discharge Recommendations: Moderate Intensity Therapy  Discharge Equipment Recommendations: to be determined by next level of care  Barriers to discharge: None    Assessment:     Mitch Whittaker is a 69 y.o. male admitted with a medical diagnosis of Physical deconditioning.  He presents with the following impairments/functional limitations: weakness, impaired endurance, impaired functional mobility, gait instability, impaired balance, pain, decreased safety awareness, decreased lower extremity function, decreased ROM, decreased coordination.    Rehab Prognosis: Good; patient would benefit from acute skilled PT services to address these deficits and reach maximum level of function.    Recent Surgery: * No surgery found *      Plan:     During this hospitalization, patient to be seen 3 x/week to address the identified rehab impairments via gait training, therapeutic activities, therapeutic exercises, neuromuscular re-education and progress toward the following goals:    Plan of Care Expires:  02/01/25    Subjective     Chief Complaint: Pt refused EOB activity due to pain, agreed to TherEx  Patient/Family Comments/goals: none stated  Pain/Comfort:  Pain Rating 1: 9/10  Location - Side 1: Right  Location - Orientation 1: generalized  Location 1: arm  Pain Addressed 1: Reposition, Distraction  Pain Rating Post-Intervention 1: 9/10  Pain Rating 2: 10/10  Location - Side 2: Bilateral (L>R)  Location - Orientation 2: generalized  Location 2: knee  Pain Addressed 2: Reposition, Distraction  Pain Rating Post-Intervention 2: 10/10      Objective:     Communicated with nurse Vázquez and epic chart review prior to session.  Patient found left sidelying with peripheral IV, telemetry, pressure relief boots (LONI mattress) upon PT entry to room.     General Precautions: Standard, fall  Orthopedic Precautions: N/A  Braces: UE  "brace  Respiratory Status: Room air     Functional Mobility:  Pt refused all OOB/EOB activity due to pain, just repositioned by PCT for bath.     Therapeutic Exercise  Patient performed 1 set(s) of 8 repetitions of the following bed level exercises: ankle pumps, quad sets, and heel slides for bilateral LE. Completed to tolerance.   Patient required skilled PT for instruction of exercises and appropriate cues to perform exercises safely and appropriately.      AM-PAC 6 CLICK MOBILITY  Turning over in bed (including adjusting bedclothes, sheets and blankets)?: 1 (Pt refused all OOB/EOB mobility)  Sitting down on and standing up from a chair with arms (e.g., wheelchair, bedside commode, etc.): 1  Moving from lying on back to sitting on the side of the bed?: 1  Moving to and from a bed to a chair (including a wheelchair)?: 1  Need to walk in hospital room?: 1  Climbing 3-5 steps with a railing?: 1  Basic Mobility Total Score: 6       Treatment & Education:  Reviewed role of PT in acute care and POC. Pt tolerated interventions fair. Reviewed importance of OOB activities, activity pacing, and HEP (marching/hip flex, hip abd, heel slides/LAQ, quad sets, ankle pumps) in order to maintain/regain strength. Encouraged to sit up in chair for all meals. Reviewed proper use of RW for safety and to reduce risk of falling. Reviewed "call don't fall" policy and increased risk of falling due to weakness, instructed to utilize call bell for assistance with all transfers. Pt agreeable to all requests.    Patient left left sidelying with all lines intact and call button in reach..    GOALS:   Multidisciplinary Problems       Physical Therapy Goals          Problem: Physical Therapy    Goal Priority Disciplines Outcome Interventions   Physical Therapy Goal     PT, PT/OT Progressing    Description: All LTGs to be met by 2/1/25    Bed mobility Sba   Transfers SBA   Gait of at least 100 feet SBA   Pt will increase AMPAC score by 2 points to " progress functional mobility  Pt will tolerate > 10 min of functional activity to progress gross functional mobility                        Time Tracking:     PT Received On: 01/24/25  PT Start Time: 0956     PT Stop Time: 1006  PT Total Time (min): 10 min     Billable Minutes: Therapeutic Exercise 10min    Treatment Type: Treatment  PT/PTA: PT     Number of PTA visits since last PT visit: 0     01/24/2025

## 2025-01-24 NOTE — PROGRESS NOTES
Nephrology Progress Note     History of Present Illness     Mr. Whittaker is a 69 year  male with a hx of previous ESRD likely related to diabetes and hypertension that was on dialysis several years prior to receiving a living related kidney transplant from his daughter in 2015.  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  He is followed by Dr. Gonsalez of [facility] seen last on 09/24/2024 at which time his serum creatinine was 2.2.     He was seen in the emergency department on 01/06/2025 complaining of increasing lower extremity edema.  At that time his serum creatinine was 2.8.  He returns to the emergency department 01/12/2025 with persistent lower extremity edema and associated blistering.  He also complains of bilateral knee pain to the point where he is unable to ambulate.  He attributes this to the change in weather.  His admission laboratory reveals a serum creatinine now up to 4.3 with a potassium of 5.4.  Nephrology has been asked to see and evaluate the patient.     As an outpatient he is chronically on Lasix 40 mg twice a day.  He denies the use of nonsteroidal anti-inflammatory drugs.  He denies dysuria hematuria or difficulty voiding.  He is chronically on Entresto.      Interval History   Overnight/currently:   Patient denies any chest pain, short of breath, nausea or vomiting.  Has no history of gout.        Allergies:    is allergic to lisinopril, actos  [pioglitazone], and metformin.    Current medications:   Scheduled Meds:   cefTRIAXone (Rocephin) IV (PEDS and ADULTS)  1 g Intravenous Q24H    gabapentin  300 mg Oral TID    insulin glargine U-100  10 Units Subcutaneous BID    LIDOcaine  2 patch Transdermal Q24H    metoprolol succinate  25 mg Oral  "Daily    mupirocin   Nasal BID    mycophenolate  500 mg Oral BID    nystatin  500,000 Units Oral QID    pantoprazole  40 mg Oral BID    predniSONE  20 mg Oral BID    sodium chloride 0.9%  10 mL Intravenous Q8H    tacrolimus  3 mg Oral BID    tamsulosin  0.4 mg Oral QHS     Continuous Infusions:  PRN Meds:.  Current Facility-Administered Medications:     0.9%  NaCl infusion (for blood administration), , Intravenous, Q24H PRN    acetaminophen, 650 mg, Oral, Q4H PRN    dextrose 50%, 12.5 g, Intravenous, PRN    dextrose 50%, 25 g, Intravenous, PRN    glucagon (human recombinant), 1 mg, Intramuscular, PRN    glucose, 16 g, Oral, PRN    glucose, 24 g, Oral, PRN    HYDROcodone-acetaminophen, 1 tablet, Oral, Q6H PRN    insulin aspart U-100, 0-10 Units, Subcutaneous, QID (AC + HS) PRN    melatonin, 6 mg, Oral, Nightly PRN    metoprolol, 5 mg, Intravenous, Q6H PRN    naloxone, 0.02 mg, Intravenous, PRN    ondansetron, 8 mg, Oral, Q8H PRN    ondansetron, 4 mg, Intravenous, Q8H PRN    polyethylene glycol, 17 g, Oral, Daily PRN    senna-docusate 8.6-50 mg, 2 tablet, Oral, Daily PRN     Physical Examination     VS/Measurements    /68 (BP Location: Right arm, Patient Position: Lying)   Pulse 92   Temp 97.2 °F (36.2 °C) (Oral)   Resp 18   Ht 5' 7" (1.702 m)   Wt 130.2 kg (287 lb 0.6 oz)   SpO2 98%   BMI 44.96 kg/m²         General:  Moderately built, not in any distress.  Neck:  Supple,   Respiratory: Non-labored,  Lungs are clear to auscultation.    Cardiovascular:  Normal rate, Regular rhythm.   Abdomen:  Soft, Non-tender, Normal bowel sounds.   Muskuloskeletal:  No pedal edema          Laboratory Results   Today's Lab Results :    Recent Results (from the past 24 hours)   POCT glucose    Collection Time: 01/23/25  4:10 PM   Result Value Ref Range    POCT Glucose 244 (H) 70 - 110 mg/dL   POCT glucose    Collection Time: 01/23/25  8:03 PM   Result Value Ref Range    POCT Glucose 285 (H) 70 - 110 mg/dL   Cortisol    " Collection Time: 01/24/25  4:46 AM   Result Value Ref Range    Cortisol 6.80 ug/dL   Uric Acid    Collection Time: 01/24/25  4:46 AM   Result Value Ref Range    Uric Acid 11.1 (H) 3.4 - 7.0 mg/dL   Calcium, ionized    Collection Time: 01/24/25  4:46 AM   Result Value Ref Range    Ionized Calcium 1.35 1.06 - 1.42 mmol/L   CBC Auto Differential    Collection Time: 01/24/25  4:46 AM   Result Value Ref Range    WBC 3.00 (L) 3.90 - 12.70 K/uL    RBC 3.60 (L) 4.60 - 6.20 M/uL    Hemoglobin 8.7 (L) 14.0 - 18.0 g/dL    Hematocrit 30.3 (L) 40.0 - 54.0 %    MCV 84 82 - 98 fL    MCH 24.2 (L) 27.0 - 31.0 pg    MCHC 28.7 (L) 32.0 - 36.0 g/dL    RDW 14.2 11.5 - 14.5 %    Platelets 375 150 - 450 K/uL    MPV 10.8 9.2 - 12.9 fL    Immature Granulocytes CANCELED 0.0 - 0.5 %    Immature Grans (Abs) CANCELED 0.00 - 0.04 K/uL    Lymph # CANCELED 1.0 - 4.8 K/uL    Mono # CANCELED 0.3 - 1.0 K/uL    Eos # CANCELED 0.0 - 0.5 K/uL    Baso # CANCELED 0.00 - 0.20 K/uL    nRBC 1 (A) 0 /100 WBC    Gran % 51.0 38.0 - 73.0 %    Lymph % 23.0 18.0 - 48.0 %    Mono % 14.0 4.0 - 15.0 %    Eosinophil % 1.0 0.0 - 8.0 %    Basophil % 1.0 0.0 - 1.9 %    Bands 2.0 %    Metamyelocytes 2.0 %    Myelocytes 6.0 %    Platelet Estimate Appears normal     Aniso Slight     Poik Slight     Hypo Occasional     Ovalocytes Occasional     Tear Drop Cells Occasional     Differential Method Manual    Basic Metabolic Panel    Collection Time: 01/24/25  4:46 AM   Result Value Ref Range    Sodium 141 136 - 145 mmol/L    Potassium 4.5 3.5 - 5.1 mmol/L    Chloride 110 95 - 110 mmol/L    CO2 22 (L) 23 - 29 mmol/L    Glucose 219 (H) 70 - 110 mg/dL    BUN 49 (H) 8 - 23 mg/dL    Creatinine 1.9 (H) 0.5 - 1.4 mg/dL    Calcium 9.9 8.7 - 10.5 mg/dL    Anion Gap 9 8 - 16 mmol/L    eGFR 37 (A) >60 mL/min/1.73 m^2   Magnesium    Collection Time: 01/24/25  4:46 AM   Result Value Ref Range    Magnesium 1.8 1.6 - 2.6 mg/dL   POCT glucose    Collection Time: 01/24/25  6:17 AM   Result Value  Ref Range    POCT Glucose 217 (H) 70 - 110 mg/dL   POCT glucose    Collection Time: 01/24/25 11:53 AM   Result Value Ref Range    POCT Glucose 277 (H) 70 - 110 mg/dL       LABS:  Reviewed Yes      Intake/Output Summary (Last 24 hours) at 1/24/2025 1222  Last data filed at 1/24/2025 0844  Gross per 24 hour   Intake 200 ml   Output 1300 ml   Net -1100 ml       Assessment and Plan       ANGELITO/CKD stage 4 in his transplant kidney.  [place] to be secondary to possible intravascular volume depletion complicated by Prograf toxicity.   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  Followed by Dr. Gonsalez.  As above.  Continue immunosuppression with CellCept, Prograf and prednisone.  Prograf trough level 1/20 likely drawn a bit early and not accurate.  Given his time out from transplant and the fact that he has a living related donor kidney transplant, a Prograf trough level of 4-6 would be adequate.  Repeat trough from this morning 5.8. his last Prograf level is within goal.          Right knee pain.  He had aspiration by Orthopedics and fluid analysis consistent with gout.  I will start him on allopurinol.     Gross hematuria.  Likely Ann trauma.  Anticoagulants on hold.  This appears to be resolved.     Hyperkalemia.   Resolved.     Hypercalcemia.  Corrected calcium 11.56.  Electrophoresis negative.  He has been on calcitriol outpatient which is being held here.  Avoid calcium and vitamin-D.   His ionized calcium in the normal range.     Anemia.  Hematuria versus possible GI bleed while on Eliquis and aspirin.  Status post 1 unit of PRBCs and hemoglobin relatively stable at this point.     Hypertension CKD.  Blood pressure at goal.       HFrEF.  Currently compensated.  EF 35% 11/2024.  Followed by Dr. Man with Cardiology.     Diabetes mellitus type 2.  Defer to Hospital Medicine.              ________________________________________________  Yoandy AYALA  [place]

## 2025-01-24 NOTE — PLAN OF CARE
Pt tolerated interventions fair due to pain. Pt refused EOB activity due to pain. Completed B LE TherEx. Recommending moderate intensity therapy upon d/c.

## 2025-01-24 NOTE — CARE UPDATE
Knee aspiration negative for infectious process. Crystals are present. Uric Acid 11.1.He received 0.6 mg Colchicine yesterday.  Will continue gout flare with prednisone 20mg PO BID. After flare has resolved, patient will need to return to 5 mg PO daily for immunosuppression (kidney transplant). Will need allopurinol to reduce future flares.

## 2025-01-24 NOTE — PROGRESS NOTES
[facility]  Progress Note    Patient Name: Mitch Whittaker  MRN: [id number]  Patient Class: IP- Inpatient   Admission Date: 1/12/2025  Length of Stay: 12 days  Attending Physician: Carlos Mathew MD  Primary Care Provider: Valery Caal MD    Subjective     Principal Problem:Physical deconditioning    HPI:  Mitch Whittaker is a 69 y.o. male patient with a PMHx of CHF- EF 40%, anemia, hyperparathyroidism, type 2 DM, s/p kidney transplant 2015 on Prograf and Cellcept, DVT on Eliquis, MARION, HLD, HTN, CKD, Hep C, and arthritis who presents to the Emergency Department for evaluation of nausea vomiting and diarrhea over the past several days (both of which have improved), but felt weak and couldn't move around like normal. no abd pain, no systemic symptoms, stool now formed. Pt is a very poor historian. According to nursing staff, family reported that the pt has been sitting in his chair for the last 3 days. Pt reports that he has had two episodes of diarrhea since yesterday, but due to the increasing weakness in his knees he was unable to get up from his chair. Pt normally ambulates with assistance from a walker. Symptoms are constant and moderate in severity. Associated sxs include blood in stool (x4 days) and n/v yesterday, but none today after PO. Patient denies any fever, abdominal pain, appetite changes, SOB, dysuria, and all other sxs at this time. No prior tx. Pt is on Eliquis. No further complaints or concerns at this time.   In the ER, VS /65, , Sats 100% on RA, Afeb, WBC 3.7, H/H 9.5/33, Bun/Cr 82/4.3, K 6.7- treated aggressively in the ER and rechecked and repeat 5.4. He is heme positive as well. C Diff Neg. She is being admitted for possible Hyperkalemia, acute GI Bleed, ANGELITO.     Overview/Hospital Course:  69 y.o. male patient with a PMHx of CHF- EF 40%, anemia, hyperparathyroidism, type 2 DM, s/p kidney transplant 2015 on Prograf and Cellcept, DVT on  Eliquis, MARION, HLD, HTN, CKD, Hep C, and arthritis who presents to the Emergency Department for evaluation of nausea vomiting and diarrhea over the past several days (both of which have improved), but felt weak and couldn't move around like normal. no abd pain, no systemic symptoms, stool now formed. Pt is a very poor historian. According to nursing staff, family reported that the pt has been sitting in his chair for the last 3 days. Pt reports that he has had two episodes of diarrhea since yesterday, but due to the increasing weakness in his knees he was unable to get up from his chair. Pt normally ambulates with assistance from a walker. Symptoms are constant and moderate in severity. Associated sxs include blood in stool (x4 days) and n/v yesterday, but none today after PO. Patient denies any fever, abdominal pain, appetite changes, SOB, dysuria, and all other sxs at this time. No prior tx. Pt is on Eliquis. No further complaints or concerns at this time.   In the ER, VS /65, , Sats 100% on RA, Afeb, WBC 3.7, H/H 9.5/33, Bun/Cr 82/4.3, K 6.7- treated aggressively in the ER and rechecked and repeat 5.4. He is heme positive as well. C Diff Neg. She is being admitted for possible Hyperkalemia, acute GI Bleed, ANGELITO.     1/13- Appreciate Renal and GI input- pt looks and feels a little better, stronger, more alert and responsive. Wife and daughter are here. His prograf level very high- 32- hence Held. It seems that he was getting Prograf toxicity at home which then resulted in Hematuria, ABL Anemia, ANGELITO and Hyperkalemia. Pt also felt weak and low sugars, so drank a lot of OJ which further raised his K level. Does not appears to have true GI bleed. But has hematuria- hence Purewick catheter placed and Dr. Bennett also started him on IVF- hematuria appears to be clearing. Pt feeling better, will check labs in am and start PT/OT. K was 6.1 this am- started on lokelma.     1/14- looks a little better, no melena  but still has hematuria. H/h stable, 8.3/28, K still 6, Bun.Cr still high 83/4. Repeat Prograf level pending. VSS Afeb, he is more alert and talking. Wound care wrapped his legs with moderate compression. Had mod R SFA stenosis, vascular evaluated him and will continue to monitor him, no surgery or angioplasty needed at this time. Will start PT/OT in am. Cont IVF, If Hematuria persists, start CBI in am.     1/15- looks better but c/o pain in his legs which are in a compression socks. Good response to iVF, Hematuria getting better and Cr down to 3 now with good urine output. H/H dropped to 7.2/22 sec to IVF and Hematuria. Still await repeat prograf level. Getting PT/OT, started on Norco for pain control.      1/16- resting quietly, comfortable, making good amount of urine, hematuria almost cleared with IVF. Bun/Cr down to 58/2.9. K normal, lokelm d/koki. H/H improved to 7.7/26. Prograf noiw 5.2, will restart at 3 mg bid. Stop hydration in am, start PT/OT. D/c home soon.     01/17/2025  Patient requiring max assists with bed transitions and feeding. High intensity therapy recommended by therapists. Patient previously independent, living at home. Referrals for rehab placed at this time.      01/18/2025  Some notable objective clinical improvement. Reports decreased ROM in UE bilaterally but predominantly his right UE. Radiography of the left hand and R shoulder unremarkable. Will obtain CT cervical spine to further assess for stenosis.     01/19/2025  Cervical spine CT shows right moderate foraminal stenosis at C3-4 and C5-6 secondary to spurring of the uncovertebral joints.  Will obtain MRI of spine and consult orthospine to assess to see if findings could be contributing to his significant UE weakness and pain.     1/20/2025:   MRI Cervical/Thoracic/Lumbar showed multilevel spondylosis mild to moderate but no acute findings. Patient's daughter tells me patient independently prior to hospitalization. Patient complains  of upper and lower bilateral weakness. While Prograf toxicity could play a part in progressive decline, he will need additional work up. CT head unremarkable. Kidney function has returned to baseline.  Vitamin B12 pending. Neurosurgery/Neurology consult pending. He is awaiting rehab vs SNF placement.     1/21/2025  MRI brain shows atrophy and microvascular changes but no acute findings. His progressive weakness was evaluated by Neurology who recommended obtaining thiamine, copper, vitamin E, and vitamin D level. Continues to have hematuria on UA. Order high risk urine culture and empirically start Rocephin. Will consider Urology consult pending hospital course.    1/22/2025  Kidney function stable. Hypothermic overnight, improved with heating blanket. Thyroid studies and infectious work up unremarkable. Suspect hypothermia could be related to chilled room condition. Plans for SNF.     1/23/2025  Patient continues to have bilateral elbow and right knee tenderness and <ROM. Discussed X-ray of knee findings with Orthopedic surgery who agrees to perform knee aspiration.  Cell count unremarkable. Crystals, Gram stain, and cultures are still pending. Will give dose of colchicine 0.6mg x 1 and monitor response.  Ann catheter discontinued today with plans for voiding trial. Plans to discharge to SNF.    1/24/2025  Aspiration consistent with gout. Uric acid elevated at 11. Steroids increased to 20 mg BID for now for acute gout flare. Renal function continues to improve and electrolytes are stable. Awaiting SNF placement.     Interval History: Still having some bilateral knee discomfort with passive ROM. Steroid dose increases will need to monitor glucose.    Review of Systems   Constitutional:  Positive for activity change and appetite change. Negative for fever.   HENT:  Negative for hearing loss.    Eyes:  Negative for visual disturbance.   Respiratory:  Negative for cough and shortness of breath.    Cardiovascular:   Positive for leg swelling. Negative for chest pain and palpitations.   Genitourinary:  Negative for difficulty urinating, dysuria, frequency and hematuria.   Musculoskeletal:  Negative for arthralgias (knee pain) and back pain.   Skin:  Positive for wound. Negative for color change and pallor.   Neurological:  Positive for weakness. Negative for seizures, syncope, numbness and headaches.   Hematological:  Does not bruise/bleed easily.   Psychiatric/Behavioral:  The patient is not nervous/anxious.      Objective:     Vital Signs (Most Recent):  Temp: 98.3 °F (36.8 °C) (01/24/25 0852)  Pulse: 94 (01/24/25 0918)  Resp: 18 (01/24/25 1202)  BP: (!) 120/58 (01/24/25 0852)  SpO2: 97 % (01/24/25 0852) Vital Signs (24h Range):  Temp:  [97.3 °F (36.3 °C)-98.3 °F (36.8 °C)] 98.3 °F (36.8 °C)  Pulse:  [77-94] 94  Resp:  [18-20] 18  SpO2:  [92 %-99 %] 97 %  BP: (114-137)/(58-78) 120/58     Weight: 130.2 kg (287 lb 0.6 oz)  Body mass index is 44.96 kg/m².    Intake/Output Summary (Last 24 hours) at 1/24/2025 1208  Last data filed at 1/24/2025 0844  Gross per 24 hour   Intake 200 ml   Output 1300 ml   Net -1100 ml         Physical Exam  Constitutional:       General: He is not in acute distress.     Appearance: Normal appearance. He is well-developed. He is obese. He is ill-appearing. He is not toxic-appearing or diaphoretic.   HENT:      Head: Normocephalic and atraumatic.   Eyes:      Extraocular Movements: Extraocular movements intact.   Cardiovascular:      Rate and Rhythm: Normal rate and regular rhythm.      Heart sounds: Normal heart sounds. No murmur heard.  Pulmonary:      Effort: Pulmonary effort is normal. No respiratory distress.      Breath sounds: Normal breath sounds. No wheezing.   Abdominal:      General: Bowel sounds are normal. There is no distension.      Palpations: Abdomen is soft. There is no mass.      Tenderness: There is no abdominal tenderness.   Musculoskeletal:         General: Swelling present.       Cervical back: Normal range of motion and neck supple.      Right lower leg: Edema present.      Left lower leg: Edema present.      Comments: Bilateral elbow tenderness. Right knee tenderness, <ROM due to pain   Skin:     General: Skin is warm and dry.      Capillary Refill: Capillary refill takes 2 to 3 seconds.   Neurological:      General: No focal deficit present.      Mental Status: He is alert and oriented to person, place, and time.      Cranial Nerves: No cranial nerve deficit.      Motor: Weakness present.   Psychiatric:         Mood and Affect: Mood normal.         Behavior: Behavior normal.         Thought Content: Thought content normal.         Judgment: Judgment normal.             Significant Labs: All pertinent labs within the past 24 hours have been reviewed.  CBC:   Recent Labs   Lab 01/23/25 0449 01/24/25 0446   WBC 2.97* 3.00*   HGB 8.2* 8.7*   HCT 28.2* 30.3*    375     CMP:   Recent Labs   Lab 01/23/25 0449 01/24/25 0446    141   K 4.4 4.5    110   CO2 19* 22*   * 219*   BUN 55* 49*   CREATININE 2.1* 1.9*   CALCIUM 9.4 9.9   PROT 4.7*  --    ALBUMIN 1.3*  --    BILITOT 0.4  --    ALKPHOS 160*  --    AST 16  --    ALT 16  --    ANIONGAP 11 9       Significant Imaging: I have reviewed all pertinent imaging results/findings within the past 24 hours.    Assessment and Plan     * Severe physical deconditioning  01/17/2025  Patient requiring max assists with bed transitions and feeding. High intensity therapy recommended by therapists. Patient previously independent, living at home. Referrals for rehab placed at this time.      01/18/2025  Some notable objective clinical improvement. Reports decreased ROM in UE bilaterally but predominantly his right UE. Radiography of the left hand and R shoulder unremarkable. Will obtain CT cervical spine to further assess for stenosis.     01/19/2025  Cervical spine CT shows right moderate foraminal stenosis at C3-4 and C5-6  secondary to spurring of the uncovertebral joints.  Will obtain MRI of spine and consult orthospine to assess to see if findings could be contributing to his significant UE weakness and pain.     1/20/2025  Patient's daughter tells me patient independently prior to hospitalization. Patient complains of upper and lower bilateral weakness. While Prograf toxicity could play a part in progressive decline, he will need additional work up. CT head unremarkable. Kidney function has returned to baseline.  Vitamin B12 pending. Neurosurgery/Neurology consult pending. He is awaiting rehab vs SNF placement.     1/21/2025  Neurology input appreciated  Follow Vitamin B12, Thiamine, Copper  Continue PT/OT  Awaiting SNF consultation    1/23/25  S/p knee aspiration today.   Cell count unremarkable  Awaiting gram stain, crystals, and culture  Continue PT/TO.   SNF  placement pending    1/24/25  Treat gout  Awaiting SNF    Right knee pain  X-ray from 1/6/25 shows soft tissue swelling, degenerative changes, and small effusion.   Continues to have persistent pain and decreased ROM  S/p aspiration. Cell count does not suggest infection  Gram stain, culture, and crystal are pending  Previous uric acid level elevated  Give Colchicine 0.6 mg PO x 1 dose and monitor response.    1/24/25  Aspiration consistent with gout  Increase prednisone to 20mg BID    Urinary retention  Gan catheter placed 1/21/25  Flomax started 1/22/25  Remove gna 1/23/25      Tacrolimus-induced GI toxicity  Prograf level very high  Prograf on hold    Await repeat Prograf level    Repeat levels normal- will restart Prograf from am at 3 mg bid    1/23/25  Repeat level pending      Gross hematuria  Stable, has Purewick catheter now  Getting IVF, if gets worse, may need CBI    Still persists but a little better  If no improvement tomorrow- will try CBI    Improving with good Urine output, CBI not needed  Cont close monitoring      Improving, cont  IVF    1/20/25  Improved  IVFs discontinued    1/21/25  Microscopic urine >100  Add high risk urine culture  Empirically place on Rocephin  Consider Urology consultation    1/22/25  Urine culture pending    CKD (chronic kidney disease) stage 4, GFR 15-29 ml/min  Creatine stable for now. BMP reviewed- noted Estimated Creatinine Clearance: 46.3 mL/min (A) (based on SCr of 1.9 mg/dL (H)). according to latest data. Based on current GFR, CKD stage is stage 4 - GFR 15-29.  Monitor UOP and serial BMP and adjust therapy as needed. Renally dose meds. Avoid nephrotoxic medications and procedures.  Pt is d/p Renal Transplant in 2015, on Prograf and Cellcept    Edwina on CKD 4- sec to Prograf toxicity- Bleeding- started on IVF  Cont IVF  Improving with IVF, Cr coming down to 3    01/24/2025  Stable, 1.9      ABL Anemia plus anemia of CKD 4      Anemia is likely due to acute blood loss which was from CKD, s/p Renal transplant and chronic disease due to Chronic Kidney Disease. Most recent hemoglobin and hematocrit are listed below.  Recent Labs     01/23/25  0449 01/24/25  0446   HGB 8.2* 8.7*   HCT 28.2* 30.3*       01/24/2025  Stable  Patient has had 1-2% bandemia. Overt infection ruled out. Patient is chronically on steroids. Will closely monitor trend.      VTE Risk Mitigation (From admission, onward)           Ordered     Reason for No Pharmacological VTE Prophylaxis  Once        Question:  Reasons:  Answer:  Active Bleeding    01/12/25 1729     IP VTE HIGH RISK PATIENT  Once         01/12/25 1729     Place sequential compression device  Until discontinued         01/12/25 1729                    Discharge Planning   GRETEL:      Code Status: Full Code   Medical Readiness for Discharge Date:   Discharge Plan A: Skilled Nursing Facility              Darya Maravilla NP  Department of Hospital Medicine   [facility] - Telemetry ([facility])

## 2025-01-24 NOTE — SUBJECTIVE & OBJECTIVE
Interval History: Still having some bilateral knee discomfort with passive ROM. Steroid dose increases will need to monitor glucose.    Review of Systems   Constitutional:  Positive for activity change and appetite change. Negative for fever.   HENT:  Negative for hearing loss.    Eyes:  Negative for visual disturbance.   Respiratory:  Negative for cough and shortness of breath.    Cardiovascular:  Positive for leg swelling. Negative for chest pain and palpitations.   Genitourinary:  Negative for difficulty urinating, dysuria, frequency and hematuria.   Musculoskeletal:  Negative for arthralgias (knee pain) and back pain.   Skin:  Positive for wound. Negative for color change and pallor.   Neurological:  Positive for weakness. Negative for seizures, syncope, numbness and headaches.   Hematological:  Does not bruise/bleed easily.   Psychiatric/Behavioral:  The patient is not nervous/anxious.      Objective:     Vital Signs (Most Recent):  Temp: 98.3 °F (36.8 °C) (01/24/25 0852)  Pulse: 94 (01/24/25 0918)  Resp: 18 (01/24/25 1202)  BP: (!) 120/58 (01/24/25 0852)  SpO2: 97 % (01/24/25 0852) Vital Signs (24h Range):  Temp:  [97.3 °F (36.3 °C)-98.3 °F (36.8 °C)] 98.3 °F (36.8 °C)  Pulse:  [77-94] 94  Resp:  [18-20] 18  SpO2:  [92 %-99 %] 97 %  BP: (114-137)/(58-78) 120/58     Weight: 130.2 kg (287 lb 0.6 oz)  Body mass index is 44.96 kg/m².    Intake/Output Summary (Last 24 hours) at 1/24/2025 1208  Last data filed at 1/24/2025 0844  Gross per 24 hour   Intake 200 ml   Output 1300 ml   Net -1100 ml         Physical Exam  Constitutional:       General: He is not in acute distress.     Appearance: Normal appearance. He is well-developed. He is obese. He is ill-appearing. He is not toxic-appearing or diaphoretic.   HENT:      Head: Normocephalic and atraumatic.   Eyes:      Extraocular Movements: Extraocular movements intact.   Cardiovascular:      Rate and Rhythm: Normal rate and regular rhythm.      Heart sounds: Normal  heart sounds. No murmur heard.  Pulmonary:      Effort: Pulmonary effort is normal. No respiratory distress.      Breath sounds: Normal breath sounds. No wheezing.   Abdominal:      General: Bowel sounds are normal. There is no distension.      Palpations: Abdomen is soft. There is no mass.      Tenderness: There is no abdominal tenderness.   Musculoskeletal:         General: Swelling present.      Cervical back: Normal range of motion and neck supple.      Right lower leg: Edema present.      Left lower leg: Edema present.      Comments: Bilateral elbow tenderness. Right knee tenderness, <ROM due to pain   Skin:     General: Skin is warm and dry.      Capillary Refill: Capillary refill takes 2 to 3 seconds.   Neurological:      General: No focal deficit present.      Mental Status: He is alert and oriented to person, place, and time.      Cranial Nerves: No cranial nerve deficit.      Motor: Weakness present.   Psychiatric:         Mood and Affect: Mood normal.         Behavior: Behavior normal.         Thought Content: Thought content normal.         Judgment: Judgment normal.             Significant Labs: All pertinent labs within the past 24 hours have been reviewed.  CBC:   Recent Labs   Lab 01/23/25  0449 01/24/25  0446   WBC 2.97* 3.00*   HGB 8.2* 8.7*   HCT 28.2* 30.3*    375     CMP:   Recent Labs   Lab 01/23/25  0449 01/24/25  0446    141   K 4.4 4.5    110   CO2 19* 22*   * 219*   BUN 55* 49*   CREATININE 2.1* 1.9*   CALCIUM 9.4 9.9   PROT 4.7*  --    ALBUMIN 1.3*  --    BILITOT 0.4  --    ALKPHOS 160*  --    AST 16  --    ALT 16  --    ANIONGAP 11 9       Significant Imaging: I have reviewed all pertinent imaging results/findings within the past 24 hours.

## 2025-01-24 NOTE — PLAN OF CARE
01/24/25 1433   Post-Acute Status   Post-Acute Authorization Placement   Post-Acute Placement Status Pending payor review/awaiting authorization (if required)   Discharge Delays None known at this time   Discharge Plan   Discharge Plan A Skilled Nursing Facility     SW received call from pt's daughter, Marybeth, regarding VM left for pt's spouse. SW met with daughters at bedside to provide SNF update. SW explained that [facility] is only accepting facility and discussed reasons for denial from other facilities (cost of care, staff inability to meet needs or bed availability). Family voiced understanding.   [facility] admissions rep, Lelia, confirmed acceptance and was notified to submit for auth.   142 pending  SW to follow

## 2025-01-24 NOTE — ASSESSMENT & PLAN NOTE
Anemia is likely due to acute blood loss which was from CKD, s/p Renal transplant and chronic disease due to Chronic Kidney Disease. Most recent hemoglobin and hematocrit are listed below.  Recent Labs     01/23/25  0449 01/24/25  0446   HGB 8.2* 8.7*   HCT 28.2* 30.3*       01/24/2025  Stable  Patient has had 1-2% bandemia. Overt infection ruled out. Patient is chronically on steroids. Will closely monitor trend.

## 2025-01-24 NOTE — ASSESSMENT & PLAN NOTE
Creatine stable for now. BMP reviewed- noted Estimated Creatinine Clearance: 46.3 mL/min (A) (based on SCr of 1.9 mg/dL (H)). according to latest data. Based on current GFR, CKD stage is stage 4 - GFR 15-29.  Monitor UOP and serial BMP and adjust therapy as needed. Renally dose meds. Avoid nephrotoxic medications and procedures.  Pt is d/p Renal Transplant in 2015, on Prograf and Cellcept    Edwina on CKD 4- sec to Prograf toxicity- Bleeding- started on IVF  Cont IVF  Improving with IVF, Cr coming down to 3    01/24/2025  Stable, 1.9

## 2025-01-24 NOTE — ASSESSMENT & PLAN NOTE
X-ray from 1/6/25 shows soft tissue swelling, degenerative changes, and small effusion.   Continues to have persistent pain and decreased ROM  S/p aspiration. Cell count does not suggest infection  Gram stain, culture, and crystal are pending  Previous uric acid level elevated  Give Colchicine 0.6 mg PO x 1 dose and monitor response.    1/24/25  Aspiration consistent with gout  Increase prednisone to 20mg BID

## 2025-01-25 PROBLEM — T45.1X5A: Status: RESOLVED | Noted: 2025-01-13 | Resolved: 2025-01-25

## 2025-01-25 PROBLEM — K63.89: Status: RESOLVED | Noted: 2025-01-13 | Resolved: 2025-01-25

## 2025-01-25 LAB
ANION GAP SERPL CALC-SCNC: 10 MMOL/L (ref 8–16)
ANISOCYTOSIS BLD QL SMEAR: SLIGHT
BASOPHILS # BLD AUTO: ABNORMAL K/UL (ref 0–0.2)
BASOPHILS NFR BLD: 0 % (ref 0–1.9)
BUN SERPL-MCNC: 46 MG/DL (ref 8–23)
CALCIUM SERPL-MCNC: 9.8 MG/DL (ref 8.7–10.5)
CHLORIDE SERPL-SCNC: 109 MMOL/L (ref 95–110)
CO2 SERPL-SCNC: 22 MMOL/L (ref 23–29)
CREAT SERPL-MCNC: 1.8 MG/DL (ref 0.5–1.4)
DIFFERENTIAL METHOD BLD: ABNORMAL
EOSINOPHIL # BLD AUTO: ABNORMAL K/UL (ref 0–0.5)
EOSINOPHIL NFR BLD: 1 % (ref 0–8)
ERYTHROCYTE [DISTWIDTH] IN BLOOD BY AUTOMATED COUNT: 14.2 % (ref 11.5–14.5)
EST. GFR  (NO RACE VARIABLE): 40 ML/MIN/1.73 M^2
GIANT PLATELETS BLD QL SMEAR: PRESENT
GLUCOSE SERPL-MCNC: 247 MG/DL (ref 70–110)
HCT VFR BLD AUTO: 30.3 % (ref 40–54)
HGB BLD-MCNC: 8.6 G/DL (ref 14–18)
IMM GRANULOCYTES # BLD AUTO: ABNORMAL K/UL (ref 0–0.04)
IMM GRANULOCYTES NFR BLD AUTO: ABNORMAL % (ref 0–0.5)
LYMPHOCYTES # BLD AUTO: ABNORMAL K/UL (ref 1–4.8)
LYMPHOCYTES NFR BLD: 22 % (ref 18–48)
MCH RBC QN AUTO: 23.9 PG (ref 27–31)
MCHC RBC AUTO-ENTMCNC: 28.4 G/DL (ref 32–36)
MCV RBC AUTO: 84 FL (ref 82–98)
METAMYELOCYTES NFR BLD MANUAL: 1 %
MONOCYTES # BLD AUTO: ABNORMAL K/UL (ref 0.3–1)
MONOCYTES NFR BLD: 10 % (ref 4–15)
MYELOCYTES NFR BLD MANUAL: 4 %
NEUTROPHILS NFR BLD: 60 % (ref 38–73)
NEUTS BAND NFR BLD MANUAL: 2 %
NRBC BLD-RTO: 1 /100 WBC
OVALOCYTES BLD QL SMEAR: ABNORMAL
PLATELET # BLD AUTO: 408 K/UL (ref 150–450)
PLATELET BLD QL SMEAR: ABNORMAL
PMV BLD AUTO: 10.9 FL (ref 9.2–12.9)
POCT GLUCOSE: 264 MG/DL (ref 70–110)
POCT GLUCOSE: 325 MG/DL (ref 70–110)
POCT GLUCOSE: 334 MG/DL (ref 70–110)
POCT GLUCOSE: 369 MG/DL (ref 70–110)
POIKILOCYTOSIS BLD QL SMEAR: SLIGHT
POTASSIUM SERPL-SCNC: 5 MMOL/L (ref 3.5–5.1)
RBC # BLD AUTO: 3.6 M/UL (ref 4.6–6.2)
SCHISTOCYTES BLD QL SMEAR: PRESENT
SODIUM SERPL-SCNC: 141 MMOL/L (ref 136–145)
WBC # BLD AUTO: 3.7 K/UL (ref 3.9–12.7)
WBC OTHER NFR BLD MANUAL: 0 %

## 2025-01-25 PROCEDURE — 99232 SBSQ HOSP IP/OBS MODERATE 35: CPT | Mod: ,,, | Performed by: INTERNAL MEDICINE

## 2025-01-25 PROCEDURE — 25000003 PHARM REV CODE 250: Performed by: HOSPITALIST

## 2025-01-25 PROCEDURE — 94640 AIRWAY INHALATION TREATMENT: CPT

## 2025-01-25 PROCEDURE — 27000221 HC OXYGEN, UP TO 24 HOURS

## 2025-01-25 PROCEDURE — 21400001 HC TELEMETRY ROOM

## 2025-01-25 PROCEDURE — 25000003 PHARM REV CODE 250: Performed by: INTERNAL MEDICINE

## 2025-01-25 PROCEDURE — 25000242 PHARM REV CODE 250 ALT 637 W/ HCPCS: Performed by: NURSE PRACTITIONER

## 2025-01-25 PROCEDURE — 85027 COMPLETE CBC AUTOMATED: CPT | Performed by: NURSE PRACTITIONER

## 2025-01-25 PROCEDURE — 94761 N-INVAS EAR/PLS OXIMETRY MLT: CPT

## 2025-01-25 PROCEDURE — 80048 BASIC METABOLIC PNL TOTAL CA: CPT | Performed by: NURSE PRACTITIONER

## 2025-01-25 PROCEDURE — 63600175 PHARM REV CODE 636 W HCPCS: Performed by: NURSE PRACTITIONER

## 2025-01-25 PROCEDURE — 25000003 PHARM REV CODE 250: Performed by: EMERGENCY MEDICINE

## 2025-01-25 PROCEDURE — 25000003 PHARM REV CODE 250: Performed by: STUDENT IN AN ORGANIZED HEALTH CARE EDUCATION/TRAINING PROGRAM

## 2025-01-25 PROCEDURE — 97530 THERAPEUTIC ACTIVITIES: CPT | Mod: CQ

## 2025-01-25 PROCEDURE — 36415 COLL VENOUS BLD VENIPUNCTURE: CPT | Performed by: NURSE PRACTITIONER

## 2025-01-25 PROCEDURE — 85007 BL SMEAR W/DIFF WBC COUNT: CPT | Performed by: NURSE PRACTITIONER

## 2025-01-25 PROCEDURE — 25000003 PHARM REV CODE 250: Performed by: PHYSICIAN ASSISTANT

## 2025-01-25 PROCEDURE — 97535 SELF CARE MNGMENT TRAINING: CPT

## 2025-01-25 PROCEDURE — 92526 ORAL FUNCTION THERAPY: CPT

## 2025-01-25 PROCEDURE — 63600175 PHARM REV CODE 636 W HCPCS: Performed by: EMERGENCY MEDICINE

## 2025-01-25 PROCEDURE — 97110 THERAPEUTIC EXERCISES: CPT

## 2025-01-25 PROCEDURE — A4216 STERILE WATER/SALINE, 10 ML: HCPCS | Performed by: EMERGENCY MEDICINE

## 2025-01-25 PROCEDURE — 25000003 PHARM REV CODE 250: Performed by: NURSE PRACTITIONER

## 2025-01-25 RX ORDER — INSULIN GLARGINE 100 [IU]/ML
13 INJECTION, SOLUTION SUBCUTANEOUS 2 TIMES DAILY
Status: DISCONTINUED | OUTPATIENT
Start: 2025-01-25 | End: 2025-01-26

## 2025-01-25 RX ORDER — HEPARIN SODIUM 5000 [USP'U]/ML
5000 INJECTION, SOLUTION INTRAVENOUS; SUBCUTANEOUS EVERY 8 HOURS
Status: DISCONTINUED | OUTPATIENT
Start: 2025-01-25 | End: 2025-01-29 | Stop reason: HOSPADM

## 2025-01-25 RX ORDER — CIPROFLOXACIN HYDROCHLORIDE 3 MG/ML
1 SOLUTION/ DROPS OPHTHALMIC 2 TIMES DAILY
Status: DISCONTINUED | OUTPATIENT
Start: 2025-01-25 | End: 2025-01-29 | Stop reason: HOSPADM

## 2025-01-25 RX ORDER — FUROSEMIDE 10 MG/ML
20 INJECTION INTRAMUSCULAR; INTRAVENOUS ONCE
Status: COMPLETED | OUTPATIENT
Start: 2025-01-25 | End: 2025-01-25

## 2025-01-25 RX ORDER — IPRATROPIUM BROMIDE AND ALBUTEROL SULFATE 2.5; .5 MG/3ML; MG/3ML
3 SOLUTION RESPIRATORY (INHALATION) ONCE
Status: COMPLETED | OUTPATIENT
Start: 2025-01-25 | End: 2025-01-25

## 2025-01-25 RX ADMIN — PREDNISONE 20 MG: 20 TABLET ORAL at 09:01

## 2025-01-25 RX ADMIN — GABAPENTIN 300 MG: 300 CAPSULE ORAL at 02:01

## 2025-01-25 RX ADMIN — INSULIN ASPART 8 UNITS: 100 INJECTION, SOLUTION INTRAVENOUS; SUBCUTANEOUS at 12:01

## 2025-01-25 RX ADMIN — NYSTATIN 500000 UNITS: 100000 SUSPENSION ORAL at 05:01

## 2025-01-25 RX ADMIN — Medication 10 ML: at 09:01

## 2025-01-25 RX ADMIN — TACROLIMUS 3 MG: 1 CAPSULE ORAL at 09:01

## 2025-01-25 RX ADMIN — Medication 10 ML: at 02:01

## 2025-01-25 RX ADMIN — GABAPENTIN 300 MG: 300 CAPSULE ORAL at 09:01

## 2025-01-25 RX ADMIN — MUPIROCIN: 20 OINTMENT TOPICAL at 09:01

## 2025-01-25 RX ADMIN — Medication 10 ML: at 06:01

## 2025-01-25 RX ADMIN — INSULIN ASPART 8 UNITS: 100 INJECTION, SOLUTION INTRAVENOUS; SUBCUTANEOUS at 05:01

## 2025-01-25 RX ADMIN — FUROSEMIDE 20 MG: 10 INJECTION, SOLUTION INTRAMUSCULAR; INTRAVENOUS at 09:01

## 2025-01-25 RX ADMIN — CIPROFLOXACIN HYDROCHLORIDE 1 DROP: 3 SOLUTION/ DROPS OPHTHALMIC at 09:01

## 2025-01-25 RX ADMIN — PANTOPRAZOLE SODIUM 40 MG: 40 TABLET, DELAYED RELEASE ORAL at 09:01

## 2025-01-25 RX ADMIN — INSULIN ASPART 4 UNITS: 100 INJECTION, SOLUTION INTRAVENOUS; SUBCUTANEOUS at 09:01

## 2025-01-25 RX ADMIN — MYCOPHENOLATE MOFETIL 500 MG: 500 TABLET, FILM COATED ORAL at 09:01

## 2025-01-25 RX ADMIN — INSULIN GLARGINE 13 UNITS: 100 INJECTION, SOLUTION SUBCUTANEOUS at 09:01

## 2025-01-25 RX ADMIN — TAMSULOSIN HYDROCHLORIDE 0.4 MG: 0.4 CAPSULE ORAL at 09:01

## 2025-01-25 RX ADMIN — METOPROLOL SUCCINATE 25 MG: 25 TABLET, FILM COATED, EXTENDED RELEASE ORAL at 09:01

## 2025-01-25 RX ADMIN — HEPARIN SODIUM 5000 UNITS: 5000 INJECTION INTRAVENOUS; SUBCUTANEOUS at 09:01

## 2025-01-25 RX ADMIN — IPRATROPIUM BROMIDE AND ALBUTEROL SULFATE 3 ML: 2.5; .5 SOLUTION RESPIRATORY (INHALATION) at 07:01

## 2025-01-25 RX ADMIN — TACROLIMUS 3 MG: 1 CAPSULE ORAL at 05:01

## 2025-01-25 RX ADMIN — LIDOCAINE 5% 2 PATCH: 700 PATCH TOPICAL at 09:01

## 2025-01-25 RX ADMIN — CIPROFLOXACIN HYDROCHLORIDE 1 DROP: 3 SOLUTION/ DROPS OPHTHALMIC at 11:01

## 2025-01-25 RX ADMIN — NYSTATIN 500000 UNITS: 100000 SUSPENSION ORAL at 02:01

## 2025-01-25 RX ADMIN — NYSTATIN 500000 UNITS: 100000 SUSPENSION ORAL at 09:01

## 2025-01-25 RX ADMIN — ALLOPURINOL 100 MG: 100 TABLET ORAL at 09:01

## 2025-01-25 NOTE — SUBJECTIVE & OBJECTIVE
Interval History: daughter at bedside and updated on plan of care. Has persistent left eye drainage. Moving extremities better today.    Review of Systems   Constitutional:  Positive for activity change and appetite change. Negative for fever.   HENT:  Negative for hearing loss.    Eyes:  Negative for visual disturbance.   Respiratory:  Negative for cough and shortness of breath.    Cardiovascular:  Positive for leg swelling. Negative for chest pain and palpitations.   Genitourinary:  Negative for difficulty urinating, dysuria, frequency and hematuria.   Musculoskeletal:  Negative for arthralgias (knee pain) and back pain.   Skin:  Positive for wound. Negative for color change and pallor.   Neurological:  Positive for weakness. Negative for seizures, syncope, numbness and headaches.   Hematological:  Does not bruise/bleed easily.   Psychiatric/Behavioral:  The patient is not nervous/anxious.      Objective:     Vital Signs (Most Recent):  Temp: 97.8 °F (36.6 °C) (01/25/25 0744)  Pulse: 95 (01/25/25 0744)  Resp: 18 (01/25/25 0744)  BP: 103/65 (01/25/25 0744)  SpO2: (!) 91 % (01/25/25 0744) Vital Signs (24h Range):  Temp:  [97.2 °F (36.2 °C)-98.2 °F (36.8 °C)] 97.8 °F (36.6 °C)  Pulse:  [88-96] 95  Resp:  [18] 18  SpO2:  [91 %-98 %] 91 %  BP: (103-136)/(53-68) 103/65     Weight: 130.2 kg (287 lb 0.6 oz)  Body mass index is 44.96 kg/m².    Intake/Output Summary (Last 24 hours) at 1/25/2025 1059  Last data filed at 1/25/2025 1000  Gross per 24 hour   Intake 680 ml   Output --   Net 680 ml         Physical Exam  Constitutional:       General: He is not in acute distress.     Appearance: Normal appearance. He is well-developed. He is obese. He is not ill-appearing, toxic-appearing or diaphoretic.   HENT:      Head: Normocephalic and atraumatic.   Eyes:      Extraocular Movements: Extraocular movements intact.   Cardiovascular:      Rate and Rhythm: Normal rate and regular rhythm.      Heart sounds: Normal heart sounds. No  murmur heard.  Pulmonary:      Effort: Pulmonary effort is normal. No respiratory distress.      Breath sounds: Normal breath sounds. No wheezing.   Abdominal:      General: Bowel sounds are normal. There is no distension.      Palpations: Abdomen is soft. There is no mass.      Tenderness: There is no abdominal tenderness.   Musculoskeletal:         General: Swelling present.      Cervical back: Normal range of motion and neck supple.      Right lower leg: Edema present.      Left lower leg: Edema present.      Comments: Elbow and knee tenderness improving     Skin:     General: Skin is warm and dry.      Capillary Refill: Capillary refill takes 2 to 3 seconds.   Neurological:      General: No focal deficit present.      Mental Status: He is alert and oriented to person, place, and time.      Cranial Nerves: No cranial nerve deficit.      Motor: Weakness present.   Psychiatric:         Mood and Affect: Mood normal.         Behavior: Behavior normal.         Thought Content: Thought content normal.         Judgment: Judgment normal.           Significant Labs: All pertinent labs within the past 24 hours have been reviewed.  CBC:   Recent Labs   Lab 01/24/25  0446 01/25/25  0445   WBC 3.00* 3.70*   HGB 8.7* 8.6*   HCT 30.3* 30.3*    408     CMP:   Recent Labs   Lab 01/24/25  0446 01/25/25  0445    141   K 4.5 5.0    109   CO2 22* 22*   * 247*   BUN 49* 46*   CREATININE 1.9* 1.8*   CALCIUM 9.9 9.8   ANIONGAP 9 10     Significant Imaging: I have reviewed all pertinent imaging results/findings within the past 24 hours.

## 2025-01-25 NOTE — PROGRESS NOTES
[facility] Medicine  Progress Note    Patient Name: Mitch Whittaker  MRN: [id number]  Patient Class: IP- Inpatient   Admission Date: 1/12/2025  Length of Stay: 13 days  Attending Physician: Carlos Mathew MD  Primary Care Provider: Valery Caal MD    Subjective     Principal Problem:Physical deconditioning        HPI:  Mitch Whittaker is a 69 y.o. male patient with a PMHx of CHF- EF 40%, anemia, hyperparathyroidism, type 2 DM, s/p kidney transplant 2015 on Prograf and Cellcept, DVT on Eliquis, MARION, HLD, HTN, CKD, Hep C, and arthritis who presents to the Emergency Department for evaluation of nausea vomiting and diarrhea over the past several days (both of which have improved), but felt weak and couldn't move around like normal. no abd pain, no systemic symptoms, stool now formed. Pt is a very poor historian. According to nursing staff, family reported that the pt has been sitting in his chair for the last 3 days. Pt reports that he has had two episodes of diarrhea since yesterday, but due to the increasing weakness in his knees he was unable to get up from his chair. Pt normally ambulates with assistance from a walker. Symptoms are constant and moderate in severity. Associated sxs include blood in stool (x4 days) and n/v yesterday, but none today after PO. Patient denies any fever, abdominal pain, appetite changes, SOB, dysuria, and all other sxs at this time. No prior tx. Pt is on Eliquis. No further complaints or concerns at this time.   In the ER, VS /65, , Sats 100% on RA, Afeb, WBC 3.7, H/H 9.5/33, Bun/Cr 82/4.3, K 6.7- treated aggressively in the ER and rechecked and repeat 5.4. He is heme positive as well. C Diff Neg. She is being admitted for possible Hyperkalemia, acute GI Bleed, ANGELITO.     Overview/Hospital Course:  69 y.o. male patient with a PMHx of CHF- EF 40%, anemia, hyperparathyroidism, type 2 DM, s/p kidney transplant 2015 on Prograf and Cellcept, DVT on  Eliquis, MARION, HLD, HTN, CKD, Hep C, and arthritis who presents to the Emergency Department for evaluation of nausea vomiting and diarrhea over the past several days (both of which have improved), but felt weak and couldn't move around like normal. no abd pain, no systemic symptoms, stool now formed. Pt is a very poor historian. According to nursing staff, family reported that the pt has been sitting in his chair for the last 3 days. Pt reports that he has had two episodes of diarrhea since yesterday, but due to the increasing weakness in his knees he was unable to get up from his chair. Pt normally ambulates with assistance from a walker. Symptoms are constant and moderate in severity. Associated sxs include blood in stool (x4 days) and n/v yesterday, but none today after PO. Patient denies any fever, abdominal pain, appetite changes, SOB, dysuria, and all other sxs at this time. No prior tx. Pt is on Eliquis. No further complaints or concerns at this time.   In the ER, VS /65, , Sats 100% on RA, Afeb, WBC 3.7, H/H 9.5/33, Bun/Cr 82/4.3, K 6.7- treated aggressively in the ER and rechecked and repeat 5.4. He is heme positive as well. C Diff Neg. She is being admitted for possible Hyperkalemia, acute GI Bleed, ANGELITO.     1/13- Appreciate Renal and GI input- pt looks and feels a little better, stronger, more alert and responsive. Wife and daughter are here. His prograf level very high- 32- hence Held. It seems that he was getting Prograf toxicity at home which then resulted in Hematuria, ABL Anemia, ANGELITO and Hyperkalemia. Pt also felt weak and low sugars, so drank a lot of OJ which further raised his K level. Does not appears to have true GI bleed. But has hematuria- hence Purewick catheter placed and Dr. Bennett also started him on IVF- hematuria appears to be clearing. Pt feeling better, will check labs in am and start PT/OT. K was 6.1 this am- started on lokelma.     1/14- looks a little better, no melena  but still has hematuria. H/h stable, 8.3/28, K still 6, Bun.Cr still high 83/4. Repeat Prograf level pending. VSS Afeb, he is more alert and talking. Wound care wrapped his legs with moderate compression. Had mod R SFA stenosis, vascular evaluated him and will continue to monitor him, no surgery or angioplasty needed at this time. Will start PT/OT in am. Cont IVF, If Hematuria persists, start CBI in am.     1/15- looks better but c/o pain in his legs which are in a compression socks. Good response to iVF, Hematuria getting better and Cr down to 3 now with good urine output. H/H dropped to 7.2/22 sec to IVF and Hematuria. Still await repeat prograf level. Getting PT/OT, started on Norco for pain control.      1/16- resting quietly, comfortable, making good amount of urine, hematuria almost cleared with IVF. Bun/Cr down to 58/2.9. K normal, lokelm d/koki. H/H improved to 7.7/26. Prograf noiw 5.2, will restart at 3 mg bid. Stop hydration in am, start PT/OT. D/c home soon.     01/17/2025  Patient requiring max assists with bed transitions and feeding. High intensity therapy recommended by therapists. Patient previously independent, living at home. Referrals for rehab placed at this time.      01/18/2025  Some notable objective clinical improvement. Reports decreased ROM in UE bilaterally but predominantly his right UE. Radiography of the left hand and R shoulder unremarkable. Will obtain CT cervical spine to further assess for stenosis.     01/19/2025  Cervical spine CT shows right moderate foraminal stenosis at C3-4 and C5-6 secondary to spurring of the uncovertebral joints.  Will obtain MRI of spine and consult orthospine to assess to see if findings could be contributing to his significant UE weakness and pain.     1/20/2025:   MRI Cervical/Thoracic/Lumbar showed multilevel spondylosis mild to moderate but no acute findings. Patient's daughter tells me patient independently prior to hospitalization. Patient complains  of upper and lower bilateral weakness. While Prograf toxicity could play a part in progressive decline, he will need additional work up. CT head unremarkable. Kidney function has returned to baseline.  Vitamin B12 pending. Neurosurgery/Neurology consult pending. He is awaiting rehab vs SNF placement.     1/21/2025  MRI brain shows atrophy and microvascular changes but no acute findings. His progressive weakness was evaluated by Neurology who recommended obtaining thiamine, copper, vitamin E, and vitamin D level. Continues to have hematuria on UA. Order high risk urine culture and empirically start Rocephin. Will consider Urology consult pending hospital course.    1/22/2025  Kidney function stable. Hypothermic overnight, improved with heating blanket. Thyroid studies and infectious work up unremarkable. Suspect hypothermia could be related to chilled room condition. Plans for SNF.     1/23/2025  Patient continues to have bilateral elbow and right knee tenderness and <ROM. Discussed X-ray of knee findings with Orthopedic surgery who agrees to perform knee aspiration.  Cell count unremarkable. Crystals, Gram stain, and cultures are still pending. Will give dose of colchicine 0.6mg x 1 and monitor response.  Ann catheter discontinued today with plans for voiding trial. Plans to discharge to SNF.    1/24/2025  Aspiration consistent with gout. Uric acid elevated at 11. Steroids increased to 20 mg BID for now for acute gout flare. Renal function continues to improve and electrolytes are stable. Awaiting SNF placement.     1/25/2025  Responding well to steroids for gout exacerbation. Moving all extremities better. Long acting insulin optimized for hyperglycemia. Awaiting SNF.    Interval History: daughter at bedside and updated on plan of care. Has persistent left eye drainage. Moving extremities better today.    Review of Systems   Constitutional:  Positive for activity change and appetite change. Negative for fever.    HENT:  Negative for hearing loss.    Eyes:  Negative for visual disturbance.   Respiratory:  Negative for cough and shortness of breath.    Cardiovascular:  Positive for leg swelling. Negative for chest pain and palpitations.   Genitourinary:  Negative for difficulty urinating, dysuria, frequency and hematuria.   Musculoskeletal:  Negative for arthralgias (knee pain) and back pain.   Skin:  Positive for wound. Negative for color change and pallor.   Neurological:  Positive for weakness. Negative for seizures, syncope, numbness and headaches.   Hematological:  Does not bruise/bleed easily.   Psychiatric/Behavioral:  The patient is not nervous/anxious.      Objective:     Vital Signs (Most Recent):  Temp: 97.8 °F (36.6 °C) (01/25/25 0744)  Pulse: 95 (01/25/25 0744)  Resp: 18 (01/25/25 0744)  BP: 103/65 (01/25/25 0744)  SpO2: (!) 91 % (01/25/25 0744) Vital Signs (24h Range):  Temp:  [97.2 °F (36.2 °C)-98.2 °F (36.8 °C)] 97.8 °F (36.6 °C)  Pulse:  [88-96] 95  Resp:  [18] 18  SpO2:  [91 %-98 %] 91 %  BP: (103-136)/(53-68) 103/65     Weight: 130.2 kg (287 lb 0.6 oz)  Body mass index is 44.96 kg/m².    Intake/Output Summary (Last 24 hours) at 1/25/2025 1059  Last data filed at 1/25/2025 1000  Gross per 24 hour   Intake 680 ml   Output --   Net 680 ml         Physical Exam  Constitutional:       General: He is not in acute distress.     Appearance: Normal appearance. He is well-developed. He is obese. He is not ill-appearing, toxic-appearing or diaphoretic.   HENT:      Head: Normocephalic and atraumatic.   Eyes:      Extraocular Movements: Extraocular movements intact.   Cardiovascular:      Rate and Rhythm: Normal rate and regular rhythm.      Heart sounds: Normal heart sounds. No murmur heard.  Pulmonary:      Effort: Pulmonary effort is normal. No respiratory distress.      Breath sounds: Normal breath sounds. No wheezing.   Abdominal:      General: Bowel sounds are normal. There is no distension.      Palpations:  Abdomen is soft. There is no mass.      Tenderness: There is no abdominal tenderness.   Musculoskeletal:         General: Swelling present.      Cervical back: Normal range of motion and neck supple.      Right lower leg: Edema present.      Left lower leg: Edema present.      Comments: Elbow and knee tenderness improving     Skin:     General: Skin is warm and dry.      Capillary Refill: Capillary refill takes 2 to 3 seconds.   Neurological:      General: No focal deficit present.      Mental Status: He is alert and oriented to person, place, and time.      Cranial Nerves: No cranial nerve deficit.      Motor: Weakness present.   Psychiatric:         Mood and Affect: Mood normal.         Behavior: Behavior normal.         Thought Content: Thought content normal.         Judgment: Judgment normal.           Significant Labs: All pertinent labs within the past 24 hours have been reviewed.  CBC:   Recent Labs   Lab 01/24/25 0446 01/25/25 0445   WBC 3.00* 3.70*   HGB 8.7* 8.6*   HCT 30.3* 30.3*    408     CMP:   Recent Labs   Lab 01/24/25 0446 01/25/25 0445    141   K 4.5 5.0    109   CO2 22* 22*   * 247*   BUN 49* 46*   CREATININE 1.9* 1.8*   CALCIUM 9.9 9.8   ANIONGAP 9 10     Significant Imaging: I have reviewed all pertinent imaging results/findings within the past 24 hours.    Assessment and Plan     * Severe physical deconditioning  01/17/2025  Patient requiring max assists with bed transitions and feeding. High intensity therapy recommended by therapists. Patient previously independent, living at home. Referrals for rehab placed at this time.      01/18/2025  Some notable objective clinical improvement. Reports decreased ROM in UE bilaterally but predominantly his right UE. Radiography of the left hand and R shoulder unremarkable. Will obtain CT cervical spine to further assess for stenosis.     01/19/2025  Cervical spine CT shows right moderate foraminal stenosis at C3-4 and C5-6  secondary to spurring of the uncovertebral joints.  Will obtain MRI of spine and consult orthospine to assess to see if findings could be contributing to his significant UE weakness and pain.     1/20/2025  Patient's daughter tells me patient independently prior to hospitalization. Patient complains of upper and lower bilateral weakness. While Prograf toxicity could play a part in progressive decline, he will need additional work up. CT head unremarkable. Kidney function has returned to baseline.  Vitamin B12 pending. Neurosurgery/Neurology consult pending. He is awaiting rehab vs SNF placement.     1/21/2025  Neurology input appreciated  Follow Vitamin B12, Thiamine, Copper  Continue PT/OT  Awaiting SNF consultation    1/23/25  S/p knee aspiration today.   Cell count unremarkable  Awaiting gram stain, crystals, and culture  Continue PT/TO.   SNF  placement pending    1/24/25  Treat gout  Awaiting SNF    1/25/25  Doing better today  Moving upper and lower extremities  Continue PT/TO  Awaiting SNF placement    Right knee pain  X-ray from 1/6/25 shows soft tissue swelling, degenerative changes, and small effusion.   Continues to have persistent pain and decreased ROM  S/p aspiration. Cell count does not suggest infection  Gram stain, culture, and crystal are pending  Previous uric acid level elevated  Give Colchicine 0.6 mg PO x 1 dose and monitor response.    1/24/25  Aspiration consistent with gout  Increase prednisone to 20mg BID    1/25/25  Improving with steroids (plan for 5 days then taper back to 5mg daily)  Allopurinol started    Urinary retention  Gan catheter placed 1/21/25  Flomax started 1/22/25  Remove gan 1/23/25 1/25/25  On Flomax  Voiding well    Gross hematuria  Stable, has Purewick catheter now  Getting IVF, if gets worse, may need CBI    Still persists but a little better  If no improvement tomorrow- will try CBI    Improving with good Urine output, CBI not needed  Cont close  monitoring      Improving, cont IVF    1/20/25  Improved  IVFs discontinued    1/21/25  Microscopic urine >100  Add high risk urine culture  Empirically place on Rocephin  Consider Urology consultation    1/22/25  Urine culture pending    1/23/25  Urine culture negative; stop IV Rocephin  Ann catheter removed and patient is voiding well    CKD (chronic kidney disease) stage 4, GFR 15-29 ml/min  Creatine stable for now. BMP reviewed- noted Estimated Creatinine Clearance: 48.8 mL/min (A) (based on SCr of 1.8 mg/dL (H)). according to latest data. Based on current GFR, CKD stage is stage 4 - GFR 15-29.  Monitor UOP and serial BMP and adjust therapy as needed. Renally dose meds. Avoid nephrotoxic medications and procedures.  Pt is d/p Renal Transplant in 2015, on Prograf and Cellcept    Edwina on CKD 4- sec to Prograf toxicity- Bleeding- started on IVF  Cont IVF  Improving with IVF, Cr coming down to 3    01/25/2025  Stable, 1.8      ABL Anemia plus anemia of CKD 4      Anemia is likely due to acute blood loss which was from CKD, s/p Renal transplant and chronic disease due to Chronic Kidney Disease. Most recent hemoglobin and hematocrit are listed below.  Recent Labs     01/23/25  0449 01/24/25  0446 01/25/25  0445   HGB 8.2* 8.7* 8.6*   HCT 28.2* 30.3* 30.3*       01/25/2025  Stable  Patient has had 1-2% bandemia. Overt infection ruled out. Patient is chronically on steroids. Will closely monitor trend.      VTE Risk Mitigation (From admission, onward)           Ordered     Reason for No Pharmacological VTE Prophylaxis  Once        Question:  Reasons:  Answer:  Active Bleeding    01/12/25 1729     IP VTE HIGH RISK PATIENT  Once         01/12/25 1729     Place sequential compression device  Until discontinued         01/12/25 1729                    Discharge Planning   GRETEL:      Code Status: Full Code   Medical Readiness for Discharge Date:   Discharge Plan A: Skilled Nursing Facility   Discharge Delays: None known at  this time            Darya Maravilla NP  Department of Hospital Medicine   [facility] - Telemetry ([facility])

## 2025-01-25 NOTE — ASSESSMENT & PLAN NOTE
X-ray from 1/6/25 shows soft tissue swelling, degenerative changes, and small effusion.   Continues to have persistent pain and decreased ROM  S/p aspiration. Cell count does not suggest infection  Gram stain, culture, and crystal are pending  Previous uric acid level elevated  Give Colchicine 0.6 mg PO x 1 dose and monitor response.    1/24/25  Aspiration consistent with gout  Increase prednisone to 20mg BID    1/25/25  Improving with steroids (plan for 5 days then taper back to 5mg daily)  Allopurinol started

## 2025-01-25 NOTE — ASSESSMENT & PLAN NOTE
Stable, has Purewick catheter now  Getting IVF, if gets worse, may need CBI    Still persists but a little better  If no improvement tomorrow- will try CBI    Improving with good Urine output, CBI not needed  Cont close monitoring      Improving, cont IVF    1/20/25  Improved  IVFs discontinued    1/21/25  Microscopic urine >100  Add high risk urine culture  Empirically place on Rocephin  Consider Urology consultation    1/22/25  Urine culture pending    1/23/25  Urine culture negative; stop IV Rocephin  Ann catheter removed and patient is voiding well

## 2025-01-25 NOTE — ASSESSMENT & PLAN NOTE
01/17/2025  Patient requiring max assists with bed transitions and feeding. High intensity therapy recommended by therapists. Patient previously independent, living at home. Referrals for rehab placed at this time.      01/18/2025  Some notable objective clinical improvement. Reports decreased ROM in UE bilaterally but predominantly his right UE. Radiography of the left hand and R shoulder unremarkable. Will obtain CT cervical spine to further assess for stenosis.     01/19/2025  Cervical spine CT shows right moderate foraminal stenosis at C3-4 and C5-6 secondary to spurring of the uncovertebral joints.  Will obtain MRI of spine and consult orthospine to assess to see if findings could be contributing to his significant UE weakness and pain.     1/20/2025  Patient's daughter tells me patient independently prior to hospitalization. Patient complains of upper and lower bilateral weakness. While Prograf toxicity could play a part in progressive decline, he will need additional work up. CT head unremarkable. Kidney function has returned to baseline.  Vitamin B12 pending. Neurosurgery/Neurology consult pending. He is awaiting rehab vs SNF placement.     1/21/2025  Neurology input appreciated  Follow Vitamin B12, Thiamine, Copper  Continue PT/OT  Awaiting SNF consultation    1/23/25  S/p knee aspiration today.   Cell count unremarkable  Awaiting gram stain, crystals, and culture  Continue PT/TO.   SNF  placement pending    1/24/25  Treat gout  Awaiting SNF    1/25/25  Doing better today  Moving upper and lower extremities  Continue PT/TO  Awaiting SNF placement   [facility] ENCOUNTER    Basic Information  Patient: Lloyd Stein Age: 20 month old Sex: male  MRN: [id number] Encounter Date: 4/3/2021, 11:52 AM  PCP: No primary care provider on file.        Chief Complaint  Chief Complaint   Patient presents with   • Ingestion       History of Present Illness    Pt here with mother after partial ingestion of tide pod. Mother states majority of pod was spilled on the floor.       I have reviewed the non physician practitioners documentation, personally taken the patient's history, performed an exam and agree with the physical findings, diagnosis and management plan.      Orders  Medications - No data to display      Laboratory and Radiology Results    No results found for this visit on 04/03/21.    XR CHEST PA AND LATERAL 2 VIEWS   Final Result   1.  No radiopaque foreign body is identified.   2.  Lungs are clear bilaterally.   3.  Nonspecific gaseous distention of colon.       Electronically Signed by: HERBERT PEÑA M.D.    Signed on: 4/3/2021 11:41 AM                Physical Exam  ED Triage Vitals   BP 04/03/21 1029 (!) 94/64   Heart Rate 04/03/21 1029 131   Resp 04/03/21 1048 28   Temp 04/03/21 1029 97.9 °F (36.6 °C)   SpO2 04/03/21 1029 100 %      General:  No acute distress. Alert.  Skin:  Warm. Dry. Intact.  Head:  Normocephalic. Atraumatic.  Eye:  PERRL. EOMI. Normal conjunctiva.  ENMT:  Oral mucosa moist. No pharyngeal erythema or exudate.  Cardiovascular:  Regular rate and rhythm. No murmur. Normal peripheral perfusion. No edema.  Respiratory:  Lungs CTA. Respirations are non-labored. BS equal.  Gastrointestinal:  Soft. Nontender. Non distended.  Back: Nontender. Normal alignment.  Neurological:  No focal neurological deficit observed.         ED Course  I participated in the following activities of this patients care: the medical history, the physical exam, medical decision making.   I personally performed: supervision of the patient's care.    The case was discussed with: the non physician practitioners, Midlevel Provider.   Evaluation and management service: I agree with the evaluation and management decisions made in this patient's care.   Results interpretation: I agree with the study interpretation in this patient's care, History, physical, EMR documentation completed by Midlevel Provider has been reviewed and agree.     Vitals:    04/03/21 1351 04/03/21 1517 04/03/21 1617 04/03/21 1635   BP:  99/45     Pulse: 119 107 117 119   Resp: 32 30 32 30   Temp:       SpO2: 97% 96% 97% 99%   Weight:               Impression and Plan    Diagnosis:  1. Ingestion of foreign substance, initial encounter            Disposition:  Discharge 4/3/2021  4:39 PM  Lloyd Stein discharge to home/self care.          Follow Up:  [facility]  [address] [address] [phone]           There are no discharge medications for this patient.        Discharge Instructions were provided  Pt is discharged in STABLE condition.      Counseled:  Patient, Regarding diagnosis, Regarding diagnostic results, Regarding treatment, and Patient understood         Rosario Arroyo DO  04/04/21 1226

## 2025-01-25 NOTE — PLAN OF CARE
Assisted nursing staff on BM and cleaning Pt. Pt unable to sit EOB d/t pain. Pt completed Therex and edu

## 2025-01-25 NOTE — ASSESSMENT & PLAN NOTE
Anemia is likely due to acute blood loss which was from CKD, s/p Renal transplant and chronic disease due to Chronic Kidney Disease. Most recent hemoglobin and hematocrit are listed below.  Recent Labs     01/23/25  0449 01/24/25  0446 01/25/25  0445   HGB 8.2* 8.7* 8.6*   HCT 28.2* 30.3* 30.3*       01/25/2025  Stable  Patient has had 1-2% bandemia. Overt infection ruled out. Patient is chronically on steroids. Will closely monitor trend.   Leslie Alva  (RN)  2019 03:39:37

## 2025-01-25 NOTE — PROGRESS NOTES
Nephrology Progress Note     History of Present Illness       Mr. Whittaker is a 69 year  male with a hx of previous ESRD likely related to diabetes and hypertension that was on dialysis several years prior to receiving a living related kidney transplant from his daughter in 2015.  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  He is followed by Dr. Gonsalez of [facility] seen last on 09/24/2024 at which time his serum creatinine was 2.2.     He was seen in the emergency department on 01/06/2025 complaining of increasing lower extremity edema.  At that time his serum creatinine was 2.8.  He returns to the emergency department 01/12/2025 with persistent lower extremity edema and associated blistering.  He also complains of bilateral knee pain to the point where he is unable to ambulate.  He attributes this to the change in weather.  His admission laboratory reveals a serum creatinine now up to 4.3 with a potassium of 5.4.  Nephrology has been asked to see and evaluate the patient.     As an outpatient he is chronically on Lasix 40 mg twice a day.  He denies the use of nonsteroidal anti-inflammatory drugs.  He denies dysuria hematuria or difficulty voiding.  He is chronically on Entresto.      Interval History   Overnight/currently:  Patient denies any chest pain, short of breath, nausea or vomiting.  He says his knee pain has improved.        Allergies:    is allergic to lisinopril, actos  [pioglitazone], and metformin.    Current medications:   Scheduled Meds:   allopurinoL  100 mg Oral Daily    ciprofloxacin HCl  1 drop Left Eye BID    gabapentin  300 mg Oral TID    insulin glargine U-100  13 Units Subcutaneous BID    LIDOcaine  2 patch Transdermal Q24H    metoprolol  "succinate  25 mg Oral Daily    mupirocin   Nasal BID    mycophenolate  500 mg Oral BID    nystatin  500,000 Units Oral QID    pantoprazole  40 mg Oral BID    predniSONE  20 mg Oral BID    sodium chloride 0.9%  10 mL Intravenous Q8H    tacrolimus  3 mg Oral BID    tamsulosin  0.4 mg Oral QHS     Continuous Infusions:  PRN Meds:.  Current Facility-Administered Medications:     0.9%  NaCl infusion (for blood administration), , Intravenous, Q24H PRN    acetaminophen, 650 mg, Oral, Q4H PRN    dextrose 50%, 12.5 g, Intravenous, PRN    dextrose 50%, 25 g, Intravenous, PRN    glucagon (human recombinant), 1 mg, Intramuscular, PRN    glucose, 16 g, Oral, PRN    glucose, 24 g, Oral, PRN    HYDROcodone-acetaminophen, 1 tablet, Oral, Q6H PRN    insulin aspart U-100, 0-10 Units, Subcutaneous, QID (AC + HS) PRN    melatonin, 6 mg, Oral, Nightly PRN    metoprolol, 5 mg, Intravenous, Q6H PRN    naloxone, 0.02 mg, Intravenous, PRN    ondansetron, 8 mg, Oral, Q8H PRN    ondansetron, 4 mg, Intravenous, Q8H PRN    polyethylene glycol, 17 g, Oral, Daily PRN    senna-docusate 8.6-50 mg, 2 tablet, Oral, Daily PRN     Physical Examination     VS/Measurements    /65 (BP Location: Left arm, Patient Position: Lying)   Pulse 95   Temp 97.8 °F (36.6 °C) (Oral)   Resp 18   Ht 5' 7" (1.702 m)   Wt 130.2 kg (287 lb 0.6 oz)   SpO2 (!) 91%   BMI 44.96 kg/m²         General:  Moderately built, not in any distress.  Neck:  Supple,   Respiratory: Non-labored,  Lungs are clear to auscultation.    Cardiovascular:  Normal rate, Regular rhythm.   Abdomen:  Soft, Non-tender, Normal bowel sounds.   Muskuloskeletal:  No pedal edema          Laboratory Results   Today's Lab Results :    Recent Results (from the past 24 hours)   POCT glucose    Collection Time: 01/24/25 11:53 AM   Result Value Ref Range    POCT Glucose 277 (H) 70 - 110 mg/dL   POCT glucose    Collection Time: 01/24/25  5:54 PM   Result Value Ref Range    POCT Glucose 250 (H) 70 - " 110 mg/dL   POCT glucose    Collection Time: 01/24/25  9:35 PM   Result Value Ref Range    POCT Glucose 265 (H) 70 - 110 mg/dL   CBC Auto Differential    Collection Time: 01/25/25  4:45 AM   Result Value Ref Range    WBC 3.70 (L) 3.90 - 12.70 K/uL    RBC 3.60 (L) 4.60 - 6.20 M/uL    Hemoglobin 8.6 (L) 14.0 - 18.0 g/dL    Hematocrit 30.3 (L) 40.0 - 54.0 %    MCV 84 82 - 98 fL    MCH 23.9 (L) 27.0 - 31.0 pg    MCHC 28.4 (L) 32.0 - 36.0 g/dL    RDW 14.2 11.5 - 14.5 %    Platelets 408 150 - 450 K/uL    MPV 10.9 9.2 - 12.9 fL    Immature Granulocytes CANCELED 0.0 - 0.5 %    Immature Grans (Abs) CANCELED 0.00 - 0.04 K/uL    Lymph # CANCELED 1.0 - 4.8 K/uL    Mono # CANCELED 0.3 - 1.0 K/uL    Eos # CANCELED 0.0 - 0.5 K/uL    Baso # CANCELED 0.00 - 0.20 K/uL    nRBC 1 (A) 0 /100 WBC    Gran % 60.0 38.0 - 73.0 %    Lymph % 22.0 18.0 - 48.0 %    Mono % 10.0 4.0 - 15.0 %    Eosinophil % 1.0 0.0 - 8.0 %    Basophil % 0.0 0.0 - 1.9 %    Bands 2.0 %    Metamyelocytes 1.0 %    Myelocytes 4.0 %    Other 0 %    Platelet Estimate Appears normal     Aniso Slight     Poik Slight     Ovalocytes Occasional     Schistocytes Present     Large/Giant Platelets Present     Differential Method Manual    Basic Metabolic Panel    Collection Time: 01/25/25  4:45 AM   Result Value Ref Range    Sodium 141 136 - 145 mmol/L    Potassium 5.0 3.5 - 5.1 mmol/L    Chloride 109 95 - 110 mmol/L    CO2 22 (L) 23 - 29 mmol/L    Glucose 247 (H) 70 - 110 mg/dL    BUN 46 (H) 8 - 23 mg/dL    Creatinine 1.8 (H) 0.5 - 1.4 mg/dL    Calcium 9.8 8.7 - 10.5 mg/dL    Anion Gap 10 8 - 16 mmol/L    eGFR 40 (A) >60 mL/min/1.73 m^2   POCT glucose    Collection Time: 01/25/25  5:34 AM   Result Value Ref Range    POCT Glucose 264 (H) 70 - 110 mg/dL       LABS:  Reviewed Yes      Intake/Output Summary (Last 24 hours) at 1/25/2025 1057  Last data filed at 1/25/2025 1000  Gross per 24 hour   Intake 680 ml   Output --   Net 680 ml       Assessment and Plan       ANGELITO/CKD stage 4  in his transplant kidney.  Wendell to be secondary to possible intravascular volume depletion complicated by Prograf toxicity.   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  Followed by Dr. Gonsalez.  As above.  Continue immunosuppression with CellCept, Prograf and prednisone.  Prograf trough level 1/20 likely drawn a bit early and not accurate.  Given his time out from transplant and the fact that he has a living related donor kidney transplant, a Prograf trough level of 4-6 would be adequate.  Repeat trough from this morning 5.8. his last Prograf level is within goal.         Right knee pain.  He had aspiration by Orthopedics and fluid analysis consistent with gout.  He is currently on prednisone tapering doses and also start him on allopurinol.     Gross hematuria.  Likely Ann trauma.  Anticoagulants on hold.  This appears to be resolved.     Hyperkalemia.   Resolved.     Hypercalcemia.  Corrected calcium 11.56.  Electrophoresis negative.  He has been on calcitriol outpatient which is being held here.  Avoid calcium and vitamin-D.   His ionized calcium in the normal range.     Anemia.  Hematuria versus possible GI bleed while on Eliquis and aspirin.  Status post 1 unit of PRBCs and hemoglobin relatively stable at this point.     Hypertension CKD.  Blood pressure at goal.       HFrEF.  Currently compensated.  EF 35% 11/2024.  Followed by Dr. Man with Cardiology.     Diabetes mellitus type 2.  Defer to Hospital Medicine.              ________________________________________________  Yoandy Bennett

## 2025-01-25 NOTE — ASSESSMENT & PLAN NOTE
Gan catheter placed 1/21/25  Flomax started 1/22/25  Remove gan 1/23/25 1/25/25  On Flomax  Voiding well

## 2025-01-25 NOTE — PT/OT/SLP PROGRESS
"Physical Therapy  Treatment    Mitch Whittaker   MRN: [id number]   Admitting Diagnosis: Physical deconditioning    PT Received On: 01/25/25  PT Start Time: 1059     PT Stop Time: 1109    PT Total Time (min): 10 min       Billable Minutes:  Therapeutic Activity 10    Treatment Type: Treatment  PT/PTA: PTA     Number of PTA visits since last PT visit: 1       General Precautions: Standard, fall  Orthopedic Precautions: N/A  Braces: N/A  Respiratory Status: Room air    Spiritual, Cultural Beliefs, Rastafarian Practices, Values that Affect Care: no    Subjective:  Communicated with epic and nurse prior to session.  Pt complained of the bed rail hurting his leg when sitting. Later "Its right there, I need to have a BM"     Pain/Comfort  Pain Rating 1: 0/10 (pt reports no knee pain or leg pain but when I removed the heel boots he yelped.)    Objective:   Patient found with: peripheral IV, telemetry, pressure relief boots (big boy bed, inflatable mattress)    Functional Mobility:  Bed Mobility:     MAX A X 2 for all bed mobility, DEPENDENT at times.     Transfers:   UNABLE         Gait:     UNABLE           Treatment and Education:  [id number] Initiated tx. Attempted to remove bed rails off sizewise bed and expand the bed fully. Unable. Obtained a blanket to provide cushion over the bed rail for EOB sitting. Heel protector boots removed and pt prepped for tx however pt stated that he needed to have a bm. Returned at 11:35 to finish session: Pt sat eob with increased reports of L thigh discomfort  . He initially leaned to his L on his L elbow but with max a he obtained midline.  Pt wished to try to stand, RW placed in front of him and he worked on anterior rocking x 3 reps  , 2 trials. Unable to lift off the bed. Poor effort and very little push through legs. R hand is limited by wrist splint for pain. Edema present in R hand. Compression to LE's with improve edema. Pt sat eob 15 min. But had a  BM due to straining to move, " Returned to bed. Staff notified. Pressure boots donned.     AM-PAC 6 CLICK MOBILITY  How much help from another person does this patient currently need?   1 = Unable, Total/Dependent Assistance  2 = A lot, Maximum/Moderate Assistance  3 = A little, Minimum/Contact Guard/Supervision  4 = None, Modified [place]/Independent    Turning over in bed (including adjusting bedclothes, sheets and blankets)?: 1  Sitting down on and standing up from a chair with arms (e.g., wheelchair, bedside commode, etc.): 1  Moving from lying on back to sitting on the side of the bed?: 1  Moving to and from a bed to a chair (including a wheelchair)?: 1  Need to walk in hospital room?: 1  Climbing 3-5 steps with a railing?: 1  Basic Mobility Total Score: 6    AM-PAC Raw Score CMS G-Code Modifier Level of Impairment Assistance   6 % Total / Unable   7 - 9 CM 80 - 100% Maximal Assist   10 - 14 CL 60 - 80% Moderate Assist   15 - 19 CK 40 - 60% Moderate Assist   20 - 22 CJ 20 - 40% Minimal Assist   23 CI 1-20% SBA / CGA   24 CH 0% Independent/ Mod I     Patient left HOB elevated with all lines intact, call button in reach, staff notified, and daughter present.    Assessment:  Mitch Whittaker is a 69 y.o. male with a medical diagnosis of Physical deconditioning and presents with ongoing pain which limits him at times. Moans and groan  frequently. He is not  very mobile and is limited by various factors. He is in a sizewise airmattress  bed which also makes mobility more difficult.  Needs increased encouragement to make progress.     Rehab identified problem list/impairments: weakness, impaired balance, decreased safety awareness, impaired skin, impaired endurance, pain, edema, impaired sensation, impaired cognition, impaired self care skills, impaired functional mobility, decreased upper extremity function, decreased coordination, decreased ROM, impaired joint extensibility, decreased lower extremity function    Rehab potential is  fair.    Activity tolerance: Fair    Discharge recommendations: Moderate Intensity Therapy      Barriers to discharge:      Equipment recommendations: other (see comments) (to be determined.)     GOALS:   Multidisciplinary Problems       Physical Therapy Goals          Problem: Physical Therapy    Goal Priority Disciplines Outcome Interventions   Physical Therapy Goal     PT, PT/OT Progressing    Description: All LTGs to be met by 2/1/25    Bed mobility Sba   Transfers SBA   Gait of at least 100 feet SBA   Pt will increase AMPAC score by 2 points to progress functional mobility  Pt will tolerate > 10 min of functional activity to progress gross functional mobility                            PLAN:    Patient to be seen 3 x/week to address the above listed problems via gait training, therapeutic activities, therapeutic exercises  Plan of Care expires: 02/01/25  Plan of Care reviewed with: patient, daughter (2 daughters in the room.)         01/25/2025

## 2025-01-25 NOTE — PT/OT/SLP PROGRESS
"Speech Language Pathology Treatment    Patient Name:  Mitch Whittaker   MRN:  [id number]  Admitting Diagnosis: Physical deconditioning    Recommendations:                 General Recommendations:  Follow-up not indicated  Diet recommendations:  Soft & Bite Sized Diet - IDDSI Level 6, Liquid Diet Level: Thin liquids - IDDSI Level 0   Aspiration Precautions: Assistance with meals, Eliminate distractions, Feed only when awake/alert, Frequent oral care, HOB to 90 degrees, Small bites/sips, and Standard aspiration precautions   General Precautions: Standard, aspiration  Communication strategies:  provide increased time to answer; cognitive deficits present    Assessment:     Mitch Whittaker is a 69 y.o. male admitted to [facility] acute with severe physical deconditioning, Tacrolimus-induced GI toxicity, gross hematuria and ANGELITO on CKD-4.  He presents with adequate REBEKA and ability to communicate acute needs, although fatigue and cognitive deficits persist (especially recent memory recall).  CT head revealed "mild to moderate periventricular white matter consistent with chronic microvascular ischemic changes."  He exhibited poor bed mobility and requires continued feeding assist.  Efficiency with bolus manipulation/mastication has improved for diet advancement to IDDSI 6-soft/bite sized solids and IDDSI 0-thin liquids, following aspiration precautions and oral care/hygiene.  No further acute ST indicated at this time, as pt goals met and deemed adequate for transition of care to moderate intensity level of intervention. Please re-consult if need.    Subjective     Pt seen bedside for ST.  Sleeping upon arrival and required multimodality cueing to arouse.  Appeared confused initially and asking where his daughter went.  Memory deficits noted with difficulty recalling events of the day.  PCT in room taking vitals also.  No c/o pain.  Patient goals: to improve mobility     Pain/Comfort:  Pain Rating 1: 0/10  Pain Rating " Post-Intervention 1: 0/10  Pain Rating 2: 0/10  Pain Rating Post-Intervention 2: 0/10    Respiratory Status: Room air    Objective:     Has the patient been evaluated by SLP for swallowing?   Yes  Keep patient NPO? No   Current Respiratory Status: RA    Pt able to communicate acute needs.  Cognitive deficits persist with apparent memory impairment.  Chronic findings on head CT noted.    Tx feeds of thin liquids and soft solids (ozzie crackers).  No overt s/s of aspiration and efficiency improved for diet advancement.  Continues to require feeding assist.    Goals:   Multidisciplinary Problems       SLP Goals       Not on file              Multidisciplinary Problems (Resolved)          Problem: SLP    Goal Priority Disciplines Outcome   SLP Goal   (Resolved)     SLP Met   Description: 1.  Pt will consume the least restrictive PO diet without overt s/s of aspiration.                       Plan:     Patient to be seen:  2 x/week, 3 x/week   Plan of Care expires:  01/27/25  Plan of Care reviewed with:  patient   SLP Follow-Up:  No (Acute POC met)       Discharge recommendations:  Moderate Intensity Therapy   Barriers to Discharge:  None    Time Tracking:     SLP Treatment Date:   01/25/25  Speech Start Time:  1600  Speech Stop Time:  1615     Speech Total Time (min):  15 min    Billable Minutes: Treatment Swallowing Dysfunction 15 minutes    01/25/2025

## 2025-01-25 NOTE — ASSESSMENT & PLAN NOTE
Creatine stable for now. BMP reviewed- noted Estimated Creatinine Clearance: 48.8 mL/min (A) (based on SCr of 1.8 mg/dL (H)). according to latest data. Based on current GFR, CKD stage is stage 4 - GFR 15-29.  Monitor UOP and serial BMP and adjust therapy as needed. Renally dose meds. Avoid nephrotoxic medications and procedures.  Pt is d/p Renal Transplant in 2015, on Prograf and Cellcept    Edwina on CKD 4- sec to Prograf toxicity- Bleeding- started on IVF  Cont IVF  Improving with IVF, Cr coming down to 3    01/25/2025  Stable, 1.8

## 2025-01-25 NOTE — PT/OT/SLP PROGRESS
"Occupational Therapy   Treatment    Name: Mitch Whittaker  MRN: [id number]  Admitting Diagnosis:  Physical deconditioning       Recommendations:     Discharge Recommendations: Moderate Intensity Therapy  Discharge Equipment Recommendations:  to be determined by next level of care  Barriers to discharge:  Decreased caregiver support    Assessment:     Mitch Whittaker is a 69 y.o. male with a medical diagnosis of Physical deconditioning.  He presents with the following performance deficits affecting function are weakness, impaired endurance, impaired self care skills, impaired functional mobility, gait instability, impaired balance, decreased coordination, decreased upper extremity function, decreased lower extremity function, decreased ROM, impaired fine motor, impaired joint extensibility, impaired muscle length.     Rehab Prognosis:  Fair; patient would benefit from acute skilled OT services to address these deficits and reach maximum level of function.       Plan:     Patient to be seen 2 x/week to address the above listed problems via self-care/home management, therapeutic activities, therapeutic exercises  Plan of Care Expires: 01/31/25  Plan of Care Reviewed with: patient, daughter    Subjective     Chief Complaint: "pain all over"  Patient/Family Comments/goals: increase ADL independence  Pain/Comfort:  Pain Rating 1: 9/10  Location - Orientation 1: generalized  Pain Addressed 1: Reposition, Nurse notified  Pain Rating Post-Intervention 1: 9/10    Objective:     Communicated with: Anita and TEAGAN prior to session.  Patient found supine with peripheral IV, telemetry, pressure relief boots upon OT entry to room.    General Precautions: Standard, fall    Orthopedic Precautions:N/A  Braces: N/A  Respiratory Status: Room air     Occupational Performance:     Bed Mobility:    Patient completed Rolling/Turning to Left with  maximal assistance of 2  Patient completed Rolling/Turning to Right with maximal assistance of " 2        Activities of Daily Living:  Bathing: maximal assistance for bathing body and face      [facility] 6 Click ADL: 8    Treatment & Education:  Encouraged completion of B UE AROM therex throughout the day to increase functional strength and activity tolerance needed for ADL completion.  Pt completed the following Therex 2x5 in order to increase BM and FM:   Shoulder flexion   Elbow Flexion   Horizontal shoulder reach  OT role, plan of care, progression of goals, importance of continued OOB activity, ADL/functional transfer and mobility retraining, call don't fall, safety precautions, fall prevention. Pt educated on sitting in chair for 1 hour during breakfast lunch and dinner in order to change positions, regulate BP and reduce bed sores.   Pt acknowledges and agrees with POC given by OT.        Patient left supine with all lines intact, bed alarm on, chair alarm on, and nurse and daughter present    GOALS:   Multidisciplinary Problems       Occupational Therapy Goals          Problem: Occupational Therapy    Goal Priority Disciplines Outcome Interventions   Occupational Therapy Goal     OT, PT/OT Progressing    Description: Goals to be met by: 1/31/25     Patient will increase functional independence with ADLs by performing:    UE Dressing with Minimal Assistance.  Sitting at edge of bed x15 minutes with Stand-by Assistance.  Rolling to Bilateral with Minimal Assistance.   Supine to sit with Minimal Assistance.                           Time Tracking:     OT Date of Treatment: 01/25/25  OT Start Time: 1010  OT Stop Time: 1035  OT Total Time (min): 25 min    Billable Minutes:Self Care/Home Management 15  Therapeutic Exercise 10    OT/ANNIA: ANNIA     Number of ANNIA visits since last OT visit: 3  ZABRINA Mancilla    1/25/2025

## 2025-01-26 PROBLEM — R33.9 URINARY RETENTION: Status: RESOLVED | Noted: 2025-01-23 | Resolved: 2025-01-26

## 2025-01-26 LAB
ANION GAP SERPL CALC-SCNC: 12 MMOL/L (ref 8–16)
ANISOCYTOSIS BLD QL SMEAR: SLIGHT
BASOPHILS # BLD AUTO: ABNORMAL K/UL (ref 0–0.2)
BASOPHILS NFR BLD: 0 % (ref 0–1.9)
BUN SERPL-MCNC: 49 MG/DL (ref 8–23)
BURR CELLS BLD QL SMEAR: ABNORMAL
CALCIUM SERPL-MCNC: 9.8 MG/DL (ref 8.7–10.5)
CHLORIDE SERPL-SCNC: 109 MMOL/L (ref 95–110)
CO2 SERPL-SCNC: 18 MMOL/L (ref 23–29)
CREAT SERPL-MCNC: 1.8 MG/DL (ref 0.5–1.4)
DIFFERENTIAL METHOD BLD: ABNORMAL
EOSINOPHIL # BLD AUTO: ABNORMAL K/UL (ref 0–0.5)
EOSINOPHIL NFR BLD: 0 % (ref 0–8)
ERYTHROCYTE [DISTWIDTH] IN BLOOD BY AUTOMATED COUNT: 14.2 % (ref 11.5–14.5)
EST. GFR  (NO RACE VARIABLE): 40 ML/MIN/1.73 M^2
GLUCOSE SERPL-MCNC: 300 MG/DL (ref 70–110)
HCT VFR BLD AUTO: 31.6 % (ref 40–54)
HGB BLD-MCNC: 8.9 G/DL (ref 14–18)
IMM GRANULOCYTES # BLD AUTO: ABNORMAL K/UL (ref 0–0.04)
IMM GRANULOCYTES NFR BLD AUTO: ABNORMAL % (ref 0–0.5)
LYMPHOCYTES # BLD AUTO: ABNORMAL K/UL (ref 1–4.8)
LYMPHOCYTES NFR BLD: 16 % (ref 18–48)
MCH RBC QN AUTO: 23.5 PG (ref 27–31)
MCHC RBC AUTO-ENTMCNC: 28.2 G/DL (ref 32–36)
MCV RBC AUTO: 84 FL (ref 82–98)
METAMYELOCYTES NFR BLD MANUAL: 3 %
MONOCYTES # BLD AUTO: ABNORMAL K/UL (ref 0.3–1)
MONOCYTES NFR BLD: 11 % (ref 4–15)
MYELOCYTES NFR BLD MANUAL: 6 %
NEUTROPHILS NFR BLD: 63 % (ref 38–73)
NEUTS BAND NFR BLD MANUAL: 1 %
NRBC BLD-RTO: 1 /100 WBC
OVALOCYTES BLD QL SMEAR: ABNORMAL
PLATELET # BLD AUTO: 379 K/UL (ref 150–450)
PLATELET BLD QL SMEAR: ABNORMAL
PMV BLD AUTO: 11.6 FL (ref 9.2–12.9)
POCT GLUCOSE: 293 MG/DL (ref 70–110)
POCT GLUCOSE: 321 MG/DL (ref 70–110)
POCT GLUCOSE: 330 MG/DL (ref 70–110)
POCT GLUCOSE: 335 MG/DL (ref 70–110)
POCT GLUCOSE: 388 MG/DL (ref 70–110)
POCT GLUCOSE: 399 MG/DL (ref 70–110)
POIKILOCYTOSIS BLD QL SMEAR: SLIGHT
POTASSIUM SERPL-SCNC: 5.3 MMOL/L (ref 3.5–5.1)
RBC # BLD AUTO: 3.78 M/UL (ref 4.6–6.2)
SODIUM SERPL-SCNC: 139 MMOL/L (ref 136–145)
WBC # BLD AUTO: 3.65 K/UL (ref 3.9–12.7)

## 2025-01-26 PROCEDURE — 25000003 PHARM REV CODE 250: Performed by: NURSE PRACTITIONER

## 2025-01-26 PROCEDURE — 97110 THERAPEUTIC EXERCISES: CPT | Mod: CQ

## 2025-01-26 PROCEDURE — 97530 THERAPEUTIC ACTIVITIES: CPT

## 2025-01-26 PROCEDURE — 25000003 PHARM REV CODE 250: Performed by: PHYSICIAN ASSISTANT

## 2025-01-26 PROCEDURE — 5A09357 ASSISTANCE WITH RESPIRATORY VENTILATION, LESS THAN 24 CONSECUTIVE HOURS, CONTINUOUS POSITIVE AIRWAY PRESSURE: ICD-10-PCS | Performed by: HOSPITALIST

## 2025-01-26 PROCEDURE — 94660 CPAP INITIATION&MGMT: CPT

## 2025-01-26 PROCEDURE — 85027 COMPLETE CBC AUTOMATED: CPT | Performed by: NURSE PRACTITIONER

## 2025-01-26 PROCEDURE — 25000003 PHARM REV CODE 250: Performed by: EMERGENCY MEDICINE

## 2025-01-26 PROCEDURE — 80048 BASIC METABOLIC PNL TOTAL CA: CPT | Performed by: NURSE PRACTITIONER

## 2025-01-26 PROCEDURE — 27000207 HC ISOLATION

## 2025-01-26 PROCEDURE — 99232 SBSQ HOSP IP/OBS MODERATE 35: CPT | Mod: ,,, | Performed by: INTERNAL MEDICINE

## 2025-01-26 PROCEDURE — 25000003 PHARM REV CODE 250: Performed by: INTERNAL MEDICINE

## 2025-01-26 PROCEDURE — 36415 COLL VENOUS BLD VENIPUNCTURE: CPT | Performed by: NURSE PRACTITIONER

## 2025-01-26 PROCEDURE — 97530 THERAPEUTIC ACTIVITIES: CPT | Mod: CQ

## 2025-01-26 PROCEDURE — A4216 STERILE WATER/SALINE, 10 ML: HCPCS | Performed by: EMERGENCY MEDICINE

## 2025-01-26 PROCEDURE — 63600175 PHARM REV CODE 636 W HCPCS: Performed by: EMERGENCY MEDICINE

## 2025-01-26 PROCEDURE — 25000003 PHARM REV CODE 250: Performed by: HOSPITALIST

## 2025-01-26 PROCEDURE — 99900035 HC TECH TIME PER 15 MIN (STAT)

## 2025-01-26 PROCEDURE — 21400001 HC TELEMETRY ROOM

## 2025-01-26 PROCEDURE — 97535 SELF CARE MNGMENT TRAINING: CPT

## 2025-01-26 PROCEDURE — 63600175 PHARM REV CODE 636 W HCPCS: Performed by: NURSE PRACTITIONER

## 2025-01-26 PROCEDURE — 85007 BL SMEAR W/DIFF WBC COUNT: CPT | Performed by: NURSE PRACTITIONER

## 2025-01-26 PROCEDURE — 25000003 PHARM REV CODE 250: Performed by: STUDENT IN AN ORGANIZED HEALTH CARE EDUCATION/TRAINING PROGRAM

## 2025-01-26 PROCEDURE — 97110 THERAPEUTIC EXERCISES: CPT

## 2025-01-26 PROCEDURE — 27000190 HC CPAP FULL FACE MASK W/VALVE

## 2025-01-26 RX ORDER — SODIUM BICARBONATE 650 MG/1
650 TABLET ORAL 3 TIMES DAILY
Status: DISCONTINUED | OUTPATIENT
Start: 2025-01-26 | End: 2025-01-29 | Stop reason: HOSPADM

## 2025-01-26 RX ORDER — INSULIN GLARGINE 100 [IU]/ML
15 INJECTION, SOLUTION SUBCUTANEOUS 2 TIMES DAILY
Status: DISCONTINUED | OUTPATIENT
Start: 2025-01-26 | End: 2025-01-27

## 2025-01-26 RX ORDER — FUROSEMIDE 40 MG/1
40 TABLET ORAL DAILY
Status: DISCONTINUED | OUTPATIENT
Start: 2025-01-26 | End: 2025-01-26

## 2025-01-26 RX ORDER — FUROSEMIDE 20 MG/1
20 TABLET ORAL DAILY
Status: DISCONTINUED | OUTPATIENT
Start: 2025-01-27 | End: 2025-01-28

## 2025-01-26 RX ORDER — CHOLESTYRAMINE 4 G/9G
1 POWDER, FOR SUSPENSION ORAL 2 TIMES DAILY
Status: DISCONTINUED | OUTPATIENT
Start: 2025-01-26 | End: 2025-01-29 | Stop reason: HOSPADM

## 2025-01-26 RX ADMIN — METOPROLOL SUCCINATE 25 MG: 25 TABLET, FILM COATED, EXTENDED RELEASE ORAL at 09:01

## 2025-01-26 RX ADMIN — GABAPENTIN 300 MG: 300 CAPSULE ORAL at 09:01

## 2025-01-26 RX ADMIN — MYCOPHENOLATE MOFETIL 500 MG: 500 TABLET, FILM COATED ORAL at 09:01

## 2025-01-26 RX ADMIN — Medication 10 ML: at 03:01

## 2025-01-26 RX ADMIN — GABAPENTIN 300 MG: 300 CAPSULE ORAL at 03:01

## 2025-01-26 RX ADMIN — ALLOPURINOL 100 MG: 100 TABLET ORAL at 09:01

## 2025-01-26 RX ADMIN — ACETAMINOPHEN 650 MG: 325 TABLET ORAL at 09:01

## 2025-01-26 RX ADMIN — CIPROFLOXACIN HYDROCHLORIDE 1 DROP: 3 SOLUTION/ DROPS OPHTHALMIC at 09:01

## 2025-01-26 RX ADMIN — PREDNISONE 20 MG: 20 TABLET ORAL at 10:01

## 2025-01-26 RX ADMIN — TACROLIMUS 3 MG: 1 CAPSULE ORAL at 06:01

## 2025-01-26 RX ADMIN — NYSTATIN 500000 UNITS: 100000 SUSPENSION ORAL at 10:01

## 2025-01-26 RX ADMIN — TACROLIMUS 3 MG: 1 CAPSULE ORAL at 09:01

## 2025-01-26 RX ADMIN — PANTOPRAZOLE SODIUM 40 MG: 40 TABLET, DELAYED RELEASE ORAL at 10:01

## 2025-01-26 RX ADMIN — MUPIROCIN: 20 OINTMENT TOPICAL at 09:01

## 2025-01-26 RX ADMIN — ONDANSETRON 4 MG: 2 INJECTION INTRAMUSCULAR; INTRAVENOUS at 05:01

## 2025-01-26 RX ADMIN — CHOLESTYRAMINE 4 G: 4 POWDER, FOR SUSPENSION ORAL at 06:01

## 2025-01-26 RX ADMIN — ONDANSETRON 8 MG: 8 TABLET, ORALLY DISINTEGRATING ORAL at 05:01

## 2025-01-26 RX ADMIN — PREDNISONE 20 MG: 20 TABLET ORAL at 09:01

## 2025-01-26 RX ADMIN — GABAPENTIN 300 MG: 300 CAPSULE ORAL at 10:01

## 2025-01-26 RX ADMIN — INSULIN ASPART 6 UNITS: 100 INJECTION, SOLUTION INTRAVENOUS; SUBCUTANEOUS at 06:01

## 2025-01-26 RX ADMIN — SODIUM ZIRCONIUM CYCLOSILICATE 5 G: 5 POWDER, FOR SUSPENSION ORAL at 09:01

## 2025-01-26 RX ADMIN — LIDOCAINE 5% 2 PATCH: 700 PATCH TOPICAL at 09:01

## 2025-01-26 RX ADMIN — INSULIN ASPART 4 UNITS: 100 INJECTION, SOLUTION INTRAVENOUS; SUBCUTANEOUS at 10:01

## 2025-01-26 RX ADMIN — INSULIN ASPART 8 UNITS: 100 INJECTION, SOLUTION INTRAVENOUS; SUBCUTANEOUS at 06:01

## 2025-01-26 RX ADMIN — INSULIN ASPART 10 UNITS: 100 INJECTION, SOLUTION INTRAVENOUS; SUBCUTANEOUS at 11:01

## 2025-01-26 RX ADMIN — NYSTATIN 500000 UNITS: 100000 SUSPENSION ORAL at 09:01

## 2025-01-26 RX ADMIN — HEPARIN SODIUM 5000 UNITS: 5000 INJECTION INTRAVENOUS; SUBCUTANEOUS at 10:01

## 2025-01-26 RX ADMIN — NYSTATIN 500000 UNITS: 100000 SUSPENSION ORAL at 12:01

## 2025-01-26 RX ADMIN — SODIUM BICARBONATE 650 MG TABLET 650 MG: at 03:01

## 2025-01-26 RX ADMIN — MYCOPHENOLATE MOFETIL 500 MG: 500 TABLET, FILM COATED ORAL at 10:01

## 2025-01-26 RX ADMIN — PANTOPRAZOLE SODIUM 40 MG: 40 TABLET, DELAYED RELEASE ORAL at 09:01

## 2025-01-26 RX ADMIN — INSULIN GLARGINE 15 UNITS: 100 INJECTION, SOLUTION SUBCUTANEOUS at 09:01

## 2025-01-26 RX ADMIN — Medication 10 ML: at 10:01

## 2025-01-26 RX ADMIN — HEPARIN SODIUM 5000 UNITS: 5000 INJECTION INTRAVENOUS; SUBCUTANEOUS at 03:01

## 2025-01-26 RX ADMIN — CIPROFLOXACIN HYDROCHLORIDE 1 DROP: 3 SOLUTION/ DROPS OPHTHALMIC at 10:01

## 2025-01-26 RX ADMIN — INSULIN GLARGINE 15 UNITS: 100 INJECTION, SOLUTION SUBCUTANEOUS at 10:01

## 2025-01-26 RX ADMIN — HEPARIN SODIUM 5000 UNITS: 5000 INJECTION INTRAVENOUS; SUBCUTANEOUS at 05:01

## 2025-01-26 RX ADMIN — TAMSULOSIN HYDROCHLORIDE 0.4 MG: 0.4 CAPSULE ORAL at 10:01

## 2025-01-26 RX ADMIN — MUPIROCIN: 20 OINTMENT TOPICAL at 10:01

## 2025-01-26 RX ADMIN — Medication 10 ML: at 05:01

## 2025-01-26 RX ADMIN — NYSTATIN 500000 UNITS: 100000 SUSPENSION ORAL at 06:01

## 2025-01-26 RX ADMIN — SODIUM BICARBONATE 650 MG TABLET 650 MG: at 10:01

## 2025-01-26 NOTE — PROGRESS NOTES
[facility] ER Medicine  Progress Note    Patient Name: Mitch Whittaker  MRN: [id number]  Patient Class: IP- Inpatient   Admission Date: 1/12/2025  Length of Stay: 14 days  Attending Physician: Carlos Mathew MD  Primary Care Provider: Valery Caal MD    Subjective     Principal Problem:Physical deconditioning    HPI:  Mitch Whittaker is a 69 y.o. male patient with a PMHx of CHF- EF 40%, anemia, hyperparathyroidism, type 2 DM, s/p kidney transplant 2015 on Prograf and Cellcept, DVT on Eliquis, MARION, HLD, HTN, CKD, Hep C, and arthritis who presents to the Emergency Department for evaluation of nausea vomiting and diarrhea over the past several days (both of which have improved), but felt weak and couldn't move around like normal. no abd pain, no systemic symptoms, stool now formed. Pt is a very poor historian. According to nursing staff, family reported that the pt has been sitting in his chair for the last 3 days. Pt reports that he has had two episodes of diarrhea since yesterday, but due to the increasing weakness in his knees he was unable to get up from his chair. Pt normally ambulates with assistance from a walker. Symptoms are constant and moderate in severity. Associated sxs include blood in stool (x4 days) and n/v yesterday, but none today after PO. Patient denies any fever, abdominal pain, appetite changes, SOB, dysuria, and all other sxs at this time. No prior tx. Pt is on Eliquis. No further complaints or concerns at this time.   In the ER, VS /65, , Sats 100% on RA, Afeb, WBC 3.7, H/H 9.5/33, Bun/Cr 82/4.3, K 6.7- treated aggressively in the ER and rechecked and repeat 5.4. He is heme positive as well. C Diff Neg. She is being admitted for possible Hyperkalemia, acute GI Bleed, ANGELITO.     Overview/Hospital Course:  69 y.o. male patient with a PMHx of CHF- EF 40%, anemia, hyperparathyroidism, type 2 DM, s/p kidney transplant 2015 on Prograf and Cellcept, DVT on  Eliquis, MARION, HLD, HTN, CKD, Hep C, and arthritis who presents to the Emergency Department for evaluation of nausea vomiting and diarrhea over the past several days (both of which have improved), but felt weak and couldn't move around like normal. no abd pain, no systemic symptoms, stool now formed. Pt is a very poor historian. According to nursing staff, family reported that the pt has been sitting in his chair for the last 3 days. Pt reports that he has had two episodes of diarrhea since yesterday, but due to the increasing weakness in his knees he was unable to get up from his chair. Pt normally ambulates with assistance from a walker. Symptoms are constant and moderate in severity. Associated sxs include blood in stool (x4 days) and n/v yesterday, but none today after PO. Patient denies any fever, abdominal pain, appetite changes, SOB, dysuria, and all other sxs at this time. No prior tx. Pt is on Eliquis. No further complaints or concerns at this time.   In the ER, VS /65, , Sats 100% on RA, Afeb, WBC 3.7, H/H 9.5/33, Bun/Cr 82/4.3, K 6.7- treated aggressively in the ER and rechecked and repeat 5.4. He is heme positive as well. C Diff Neg. She is being admitted for possible Hyperkalemia, acute GI Bleed, ANGELITO.     1/13- Appreciate Renal and GI input- pt looks and feels a little better, stronger, more alert and responsive. Wife and daughter are here. His prograf level very high- 32- hence Held. It seems that he was getting Prograf toxicity at home which then resulted in Hematuria, ABL Anemia, ANGELITO and Hyperkalemia. Pt also felt weak and low sugars, so drank a lot of OJ which further raised his K level. Does not appears to have true GI bleed. But has hematuria- hence Purewick catheter placed and Dr. Bennett also started him on IVF- hematuria appears to be clearing. Pt feeling better, will check labs in am and start PT/OT. K was 6.1 this am- started on lokelma.     1/14- looks a little better, no melena  but still has hematuria. H/h stable, 8.3/28, K still 6, Bun.Cr still high 83/4. Repeat Prograf level pending. VSS Afeb, he is more alert and talking. Wound care wrapped his legs with moderate compression. Had mod R SFA stenosis, vascular evaluated him and will continue to monitor him, no surgery or angioplasty needed at this time. Will start PT/OT in am. Cont IVF, If Hematuria persists, start CBI in am.     1/15- looks better but c/o pain in his legs which are in a compression socks. Good response to iVF, Hematuria getting better and Cr down to 3 now with good urine output. H/H dropped to 7.2/22 sec to IVF and Hematuria. Still await repeat prograf level. Getting PT/OT, started on Norco for pain control.      1/16- resting quietly, comfortable, making good amount of urine, hematuria almost cleared with IVF. Bun/Cr down to 58/2.9. K normal, lokelm d/koki. H/H improved to 7.7/26. Prograf noiw 5.2, will restart at 3 mg bid. Stop hydration in am, start PT/OT. D/c home soon.     01/17/2025  Patient requiring max assists with bed transitions and feeding. High intensity therapy recommended by therapists. Patient previously independent, living at home. Referrals for rehab placed at this time.      01/18/2025  Some notable objective clinical improvement. Reports decreased ROM in UE bilaterally but predominantly his right UE. Radiography of the left hand and R shoulder unremarkable. Will obtain CT cervical spine to further assess for stenosis.     01/19/2025  Cervical spine CT shows right moderate foraminal stenosis at C3-4 and C5-6 secondary to spurring of the uncovertebral joints.  Will obtain MRI of spine and consult orthospine to assess to see if findings could be contributing to his significant UE weakness and pain.     1/20/2025:   MRI Cervical/Thoracic/Lumbar showed multilevel spondylosis mild to moderate but no acute findings. Patient's daughter tells me patient independently prior to hospitalization. Patient complains  of upper and lower bilateral weakness. While Prograf toxicity could play a part in progressive decline, he will need additional work up. CT head unremarkable. Kidney function has returned to baseline.  Vitamin B12 pending. Neurosurgery/Neurology consult pending. He is awaiting rehab vs SNF placement.     1/21/2025  MRI brain shows atrophy and microvascular changes but no acute findings. His progressive weakness was evaluated by Neurology who recommended obtaining thiamine, copper, vitamin E, and vitamin D level. Continues to have hematuria on UA. Order high risk urine culture and empirically start Rocephin. Will consider Urology consult pending hospital course.    1/22/2025  Kidney function stable. Hypothermic overnight, improved with heating blanket. Thyroid studies and infectious work up unremarkable. Suspect hypothermia could be related to chilled room condition. Plans for SNF.     1/23/2025  Patient continues to have bilateral elbow and right knee tenderness and <ROM. Discussed X-ray of knee findings with Orthopedic surgery who agrees to perform knee aspiration.  Cell count unremarkable. Crystals, Gram stain, and cultures are still pending. Will give dose of colchicine 0.6mg x 1 and monitor response.  Ann catheter discontinued today with plans for voiding trial. Plans to discharge to SNF.    1/24/2025  Aspiration consistent with gout. Uric acid elevated at 11. Steroids increased to 20 mg BID for now for acute gout flare. Renal function continues to improve and electrolytes are stable. Awaiting SNF placement.     1/25/2025  Responding well to steroids for gout exacerbation. Moving all extremities better. Long acting insulin optimized for steroid induced hyperglycemia. Awaiting SNF.    1/26/2025  Doing well today. Patient sat up on side of bed with standby assistance this AM. He had an episode of dyspnea after waking yesterday evening. CXR shows low grade CHF, he received Lasix 20 mg IV x 1 dose with  improvement. Patient has a history of CHF previously on Lasix 40mg BID, but was held due to acute on chronic renal failure. Renal function continues to remain stable, so will start Lasix 40mg daily. Orders placed for nocturnal CPAP. Medications optimized for steroid induced hyperglycemia.     No new subjective & objective note has been filed under this hospital service since the last note was generated.      Assessment and Plan     * Severe physical deconditioning  01/17/2025  Patient requiring max assists with bed transitions and feeding. High intensity therapy recommended by therapists. Patient previously independent, living at home. Referrals for rehab placed at this time.      01/18/2025  Some notable objective clinical improvement. Reports decreased ROM in UE bilaterally but predominantly his right UE. Radiography of the left hand and R shoulder unremarkable. Will obtain CT cervical spine to further assess for stenosis.     01/19/2025  Cervical spine CT shows right moderate foraminal stenosis at C3-4 and C5-6 secondary to spurring of the uncovertebral joints.  Will obtain MRI of spine and consult orthospine to assess to see if findings could be contributing to his significant UE weakness and pain.     1/20/2025  Patient's daughter tells me patient independently prior to hospitalization. Patient complains of upper and lower bilateral weakness. While Prograf toxicity could play a part in progressive decline, he will need additional work up. CT head unremarkable. Kidney function has returned to baseline.  Vitamin B12 pending. Neurosurgery/Neurology consult pending. He is awaiting rehab vs SNF placement.     1/21/2025  Neurology input appreciated  Follow Vitamin B12, Thiamine, Copper  Continue PT/OT  Awaiting SNF consultation    1/23/25  S/p knee aspiration today.   Cell count unremarkable  Awaiting gram stain, crystals, and culture  Continue PT/TO.   SNF  placement pending    1/24/25  Treat gout  Awaiting  SNF    1/25/25  Doing better today  Moving upper and lower extremities  Continue PT/TO  Awaiting SNF placement    1/26/25  Gradually improving. He was able to sit up on side of bed with standby assistance. Continue PT/TO. Awaiting SNF placement.     Right knee pain  X-ray from 1/6/25 shows soft tissue swelling, degenerative changes, and small effusion.   Continues to have persistent pain and decreased ROM  S/p aspiration. Cell count does not suggest infection  Gram stain, culture, and crystal are pending  Previous uric acid level elevated  Give Colchicine 0.6 mg PO x 1 dose and monitor response.    1/24/25  Aspiration consistent with gout  Increase prednisone to 20mg BID    1/25/25  Improving with steroids (plan for 5 days then taper back to 5mg daily)  Allopurinol started    1/26/25  Related to gouty arthritis  Improving. Will need to continue management above    Gross hematuria  Stable, has Purewick catheter now  Getting IVF, if gets worse, may need CBI    Still persists but a little better  If no improvement tomorrow- will try CBI    Improving with good Urine output, CBI not needed  Cont close monitoring      Improving, cont IVF    1/20/25  Improved  IVFs discontinued    1/21/25  Microscopic urine >100  Add high risk urine culture  Empirically place on Rocephin  Consider Urology consultation    1/22/25  Urine culture pending    1/23/25  Urine culture negative; stop IV Rocephin  Ann catheter removed and patient is voiding well    Hyperkalemia  Hyperkalemia is likely due to Increased potassium intake.The patients most recent potassium results are listed below.  Recent Labs     01/24/25  0446 01/25/25  0445 01/26/25  0447   K 4.5 5.0 5.3*       01/26/2025  Slightly elevated today. Give Lokelma x 1 dose      CKD (chronic kidney disease) stage 4, GFR 15-29 ml/min  Creatine stable for now. BMP reviewed- noted Estimated Creatinine Clearance: 48.8 mL/min (A) (based on SCr of 1.8 mg/dL (H)). according to latest data.  "Based on current GFR, CKD stage is stage 4 - GFR 15-29.  Monitor UOP and serial BMP and adjust therapy as needed. Renally dose meds. Avoid nephrotoxic medications and procedures.  Pt is d/p Renal Transplant in 2015, on Prograf and Cellcept    Edwina on CKD 4- sec to Prograf toxicity- Bleeding- started on IVF  Cont IVF  Improving with IVF, Cr coming down to 3    01/26/2025  Stable, 1.8      Chronic combined systolic and diastolic congestive heart failure  Patient has Combined Systolic and Diastolic heart failure that is Chronic. On presentation their CHF was  euvolemic . Most recent BNP and echo results are listed below.  No results for input(s): "BNP" in the last 72 hours.  Latest ECHO  Results for orders placed during the hospital encounter of 11/26/24    Echo    Interpretation Summary    Left Ventricle: The left ventricle is normal in size. Normal wall thickness. There is concentric hypertrophy. Normal wall motion. Septal motion is consistent with bundle branch block. There is moderately reduced systolic function. Ejection fraction is approximately 35%. Grade I diastolic dysfunction.    Right Ventricle: Normal right ventricular cavity size. Wall thickness is normal. Systolic function is normal.    IVC/SVC: Normal venous pressure at 3 mmHg.    Current Heart Failure Medications  metoprolol succinate (TOPROL-XL) 24 hr tablet 25 mg, Daily, Oral    Plan  - Monitor strict I&Os and daily weights.    - Place on telemetry  - Low sodium diet  - Place on fluid restriction of 1.5 L.   - Cardiology has not been consulted  - The patient's volume status is  stable    1/26/25  Episode of dyspnea yesterday. CXR shows early CHF. EF 35% with DD. He was given Lasix IV x 1 dose,. Patient was previously taking 40m BID, but this was held on admit given acute on chronic renal failure. His creatinine is now lower than usual baseline of 2.2.   Plan:   Start Lasix 40mg daily to maintain fluid balance    Type 2 diabetes mellitus with stable " proliferative retinopathy of both eyes, with long-term current use of insulin  Patient's FSGs are controlled on current medication regimen.  Last A1c reviewed-   Lab Results   Component Value Date    HGBA1C 5.7 (H) 12/17/2024     Most recent fingerstick glucose reviewed-   Recent Labs   Lab 01/25/25  1737 01/25/25  2109 01/26/25  0624 01/26/25  0947   POCTGLUCOSE 369* 325* 293* 399*     Current correctional scale  Medium  Maintain anti-hyperglycemic dose as follows-   Antihyperglycemics (From admission, onward)      Start     Stop Route Frequency Ordered    01/26/25 1000  insulin glargine U-100 (Lantus) pen 15 Units         -- SubQ 2 times daily 01/26/25 0922    01/14/25 1503  insulin aspart U-100 pen 0-10 Units         -- SubQ Before meals & nightly PRN 01/14/25 1404        1/26/25  Glucose usually controlled when he was on prednisone 5mg daily. Steroid increased due to gouty arthritis and has required optimization of sliding scale long acting insulin. I suspect his glucose will stabilize when steroids are tapered.    ABL Anemia plus anemia of CKD 4      Anemia is likely due to acute blood loss which was from CKD, s/p Renal transplant and chronic disease due to Chronic Kidney Disease. Most recent hemoglobin and hematocrit are listed below.  Recent Labs     01/23/25  0449 01/24/25  0446 01/25/25  0445   HGB 8.2* 8.7* 8.6*   HCT 28.2* 30.3* 30.3*       01/25/2025  Stable  Patient has had 1-2% bandemia. Overt infection ruled out. Patient is chronically on steroids. Will closely monitor trend.      VTE Risk Mitigation (From admission, onward)           Ordered     heparin (porcine) injection 5,000 Units  Every 8 hours         01/25/25 1828     Reason for No Pharmacological VTE Prophylaxis  Once        Question:  Reasons:  Answer:  Active Bleeding    01/12/25 1729     IP VTE HIGH RISK PATIENT  Once         01/12/25 1729     Place sequential compression device  Until discontinued         01/12/25 1729                     Discharge Planning   GRETEL:      Code Status: Full Code   Medical Readiness for Discharge Date:   Discharge Plan A: Skilled Nursing Facility   Discharge Delays: None known at this time          Darya Maravilla NP  Department of Hospital Medicine   [facility] ([facility])

## 2025-01-26 NOTE — PT/OT/SLP PROGRESS
"Occupational Therapy   Treatment    Name: Mitch Whittaker  MRN: [id number]  Admitting Diagnosis:  Physical deconditioning       Recommendations:     Discharge Recommendations: Moderate Intensity Therapy  Discharge Equipment Recommendations:  to be determined by next level of care  Barriers to discharge:  Decreased caregiver support    Assessment:     Mitch Whittaker is a 69 y.o. male with a medical diagnosis of Physical deconditioning.  He presents with the following performance deficits affecting function are weakness, impaired endurance, impaired self care skills, impaired functional mobility, gait instability, impaired balance, decreased coordination, decreased upper extremity function, decreased lower extremity function, abnormal tone, impaired coordination, impaired fine motor, edema, impaired cardiopulmonary response to activity.     Rehab Prognosis:  Fair; patient would benefit from acute skilled OT services to address these deficits and reach maximum level of function.       Plan:     Patient to be seen 2 x/week to address the above listed problems via self-care/home management, therapeutic activities, therapeutic exercises  Plan of Care Expires: 01/31/25  Plan of Care Reviewed with: patient    Subjective     Chief Complaint: Rt shoulder pain  Patient/Family Comments/goals: "Get out of bed"  Pain/Comfort:  Pain Rating 1: 3/10  Location - Side 1: Right  Location - Orientation 1: generalized  Location 1: shoulder  Pain Addressed 1: Distraction, Reposition  Pain Rating Post-Intervention 1: 3/10  Location - Side 2: Bilateral  Location - Orientation 2: generalized  Location 2: knee  Pain Addressed 2: Reposition, Distraction    Objective:     Communicated with: Anita and TEAGAN prior to session.  Patient found supine with oxygen, pressure relief boots, peripheral IV, telemetry upon OT entry to room.    General Precautions: Standard, aphasia, respiratory    Orthopedic Precautions:N/A  Braces: N/A  Respiratory Status: " Nasal cannula, flow 2 L/min     Occupational Performance:     Bed Mobility:    Patient completed Rolling/Turning to Left with  moderate assistance of 2  Patient completed Rolling/Turning to Right with moderate assistance of 2  Patient completed Scooting/Bridging with moderate assistance of 2  Patient completed Supine to Sit with moderate assistance of 2  Patient completed Sit to Supine with moderate assistance of 2  Pt required extra time for all BM d/t extreme pain with any movement  Pt completed EOB dynamic seated for 20 min with Mod A of 1      Activities of Daily Living:  Grooming: setup assist brush mouth  Grooming: max assist clean dentures      [facility] 6 Click ADL: 8    Treatment & Education:  Encouraged completion of B UE AROM therex throughout the day to increase functional strength and activity tolerance needed for ADL completion.  Pt completed the following Therex 1x10 in order to increase BM and FM:   Shoulder flexion   Elbow Flexion   Chair presses  OT role, plan of care, progression of goals, importance of continued OOB activity, ADL/functional transfer and mobility retraining, call don't fall, safety precautions, fall prevention. Pt educated on sitting in chair for 1 hour during breakfast lunch and dinner in order to change positions, regulate BP and reduce bed sores.   Pt acknowledges and agrees with POC given by OT.      Patient left HOB elevated with all lines intact, call button in reach, and bed alarm on    GOALS:   Multidisciplinary Problems       Occupational Therapy Goals          Problem: Occupational Therapy    Goal Priority Disciplines Outcome Interventions   Occupational Therapy Goal     OT, PT/OT Progressing    Description: Goals to be met by: 1/31/25     Patient will increase functional independence with ADLs by performing:    UE Dressing with Minimal Assistance.  Sitting at edge of bed x15 minutes with Stand-by Assistance.  Rolling to Bilateral with Minimal Assistance.   Supine to sit  with Minimal Assistance.                           Time Tracking:     OT Date of Treatment: 01/26/25  OT Start Time: 0940  OT Stop Time: 1020  OT Total Time (min): 40 min    Billable Minutes:Self Care/Home Management 20  Therapeutic Activity 10  Therapeutic Exercise 10    OT/ANNIA: ANNIA     Number of ANNIA visits since last OT visit: 4  ZABRINA Mancilla    1/26/2025

## 2025-01-26 NOTE — CARE UPDATE
Patient has had 3 episodes of liquid diarrhea this afternoon. Will order stool studies including C. Diff. Questran ordered also.

## 2025-01-26 NOTE — PLAN OF CARE
Mr Panfilo pain was significantly better today. He only complained of R shoulder pain. MODERATE Assit of 2 people for bed  mobility . He tolerated sitting eob 20 min with minimal support.

## 2025-01-26 NOTE — PROGRESS NOTES
Nephrology Progress Note     History of Present Illness     Mr. Whittaker is a 69 year  male with a hx of previous ESRD likely related to diabetes and hypertension that was on dialysis several years prior to receiving a living related kidney transplant from his daughter in 2015.  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  He is followed by Dr. Gonsalez of [facility] seen last on 09/24/2024 at which time his serum creatinine was 2.2.     He was seen in the emergency department on 01/06/2025 complaining of increasing lower extremity edema.  At that time his serum creatinine was 2.8.  He returns to the emergency department 01/12/2025 with persistent lower extremity edema and associated blistering.  He also complains of bilateral knee pain to the point where he is unable to ambulate.  He attributes this to the change in weather.  His admission laboratory reveals a serum creatinine now up to 4.3 with a potassium of 5.4.  Nephrology has been asked to see and evaluate the patient.     As an outpatient he is chronically on Lasix 40 mg twice a day.  He denies the use of nonsteroidal anti-inflammatory drugs.  He denies dysuria hematuria or difficulty voiding.  He is chronically on Entresto.      Interval History   Overnight/currently:  Patient denies any chest pain, short of breath, nausea or vomiting.        Allergies:    is allergic to lisinopril, actos  [pioglitazone], and metformin.    Current medications:   Scheduled Meds:   allopurinoL  100 mg Oral Daily    ciprofloxacin HCl  1 drop Left Eye BID    furosemide  40 mg Oral Daily    gabapentin  300 mg Oral TID    heparin (porcine)  5,000 Units Subcutaneous Q8H    insulin glargine U-100  15 Units Subcutaneous BID    LIDOcaine   "2 patch Transdermal Q24H    metoprolol succinate  25 mg Oral Daily    mupirocin   Nasal BID    mycophenolate  500 mg Oral BID    nystatin  500,000 Units Oral QID    pantoprazole  40 mg Oral BID    predniSONE  20 mg Oral BID    sodium chloride 0.9%  10 mL Intravenous Q8H    tacrolimus  3 mg Oral BID    tamsulosin  0.4 mg Oral QHS     Continuous Infusions:  PRN Meds:.  Current Facility-Administered Medications:     0.9%  NaCl infusion (for blood administration), , Intravenous, Q24H PRN    acetaminophen, 650 mg, Oral, Q4H PRN    dextrose 50%, 12.5 g, Intravenous, PRN    dextrose 50%, 25 g, Intravenous, PRN    glucagon (human recombinant), 1 mg, Intramuscular, PRN    glucose, 16 g, Oral, PRN    glucose, 24 g, Oral, PRN    HYDROcodone-acetaminophen, 1 tablet, Oral, Q6H PRN    insulin aspart U-100, 0-10 Units, Subcutaneous, QID (AC + HS) PRN    melatonin, 6 mg, Oral, Nightly PRN    metoprolol, 5 mg, Intravenous, Q6H PRN    naloxone, 0.02 mg, Intravenous, PRN    ondansetron, 8 mg, Oral, Q8H PRN    ondansetron, 4 mg, Intravenous, Q8H PRN    polyethylene glycol, 17 g, Oral, Daily PRN    senna-docusate 8.6-50 mg, 2 tablet, Oral, Daily PRN     Physical Examination     VS/Measurements    BP (!) 93/54 (BP Location: Left arm, Patient Position: Lying)   Pulse 89   Temp 97.7 °F (36.5 °C) (Axillary)   Resp 16   Ht 5' 7" (1.702 m)   Wt 130.2 kg (287 lb 0.6 oz)   SpO2 97%   BMI 44.96 kg/m²         General:  Obese, not in any distress.  Neck:  Supple,   Respiratory: Non-labored,  Lungs are clear to auscultation.    Cardiovascular:  Normal rate, Regular rhythm.   Abdomen:  Soft, Non-tender, Normal bowel sounds.   Muskuloskeletal:  No pedal edema          Laboratory Results   Today's Lab Results :    Recent Results (from the past 24 hours)   POCT glucose    Collection Time: 01/25/25 12:34 PM   Result Value Ref Range    POCT Glucose 334 (H) 70 - 110 mg/dL   POCT glucose    Collection Time: 01/25/25  5:37 PM   Result Value Ref Range "    POCT Glucose 369 (H) 70 - 110 mg/dL   POCT glucose    Collection Time: 01/25/25  9:09 PM   Result Value Ref Range    POCT Glucose 325 (H) 70 - 110 mg/dL   CBC Auto Differential    Collection Time: 01/26/25  4:47 AM   Result Value Ref Range    WBC 3.65 (L) 3.90 - 12.70 K/uL    RBC 3.78 (L) 4.60 - 6.20 M/uL    Hemoglobin 8.9 (L) 14.0 - 18.0 g/dL    Hematocrit 31.6 (L) 40.0 - 54.0 %    MCV 84 82 - 98 fL    MCH 23.5 (L) 27.0 - 31.0 pg    MCHC 28.2 (L) 32.0 - 36.0 g/dL    RDW 14.2 11.5 - 14.5 %    Platelets 379 150 - 450 K/uL    MPV 11.6 9.2 - 12.9 fL    Immature Granulocytes CANCELED 0.0 - 0.5 %    Immature Grans (Abs) CANCELED 0.00 - 0.04 K/uL    Lymph # CANCELED 1.0 - 4.8 K/uL    Mono # CANCELED 0.3 - 1.0 K/uL    Eos # CANCELED 0.0 - 0.5 K/uL    Baso # CANCELED 0.00 - 0.20 K/uL    nRBC 1 (A) 0 /100 WBC    Gran % 63.0 38.0 - 73.0 %    Lymph % 16.0 (L) 18.0 - 48.0 %    Mono % 11.0 4.0 - 15.0 %    Eosinophil % 0.0 0.0 - 8.0 %    Basophil % 0.0 0.0 - 1.9 %    Bands 1.0 %    Metamyelocytes 3.0 %    Myelocytes 6.0 %    Platelet Estimate Appears normal     Aniso Slight     Poik Slight     Ovalocytes Occasional     Madison Cells Occasional     Differential Method Manual    Basic Metabolic Panel    Collection Time: 01/26/25  4:47 AM   Result Value Ref Range    Sodium 139 136 - 145 mmol/L    Potassium 5.3 (H) 3.5 - 5.1 mmol/L    Chloride 109 95 - 110 mmol/L    CO2 18 (L) 23 - 29 mmol/L    Glucose 300 (H) 70 - 110 mg/dL    BUN 49 (H) 8 - 23 mg/dL    Creatinine 1.8 (H) 0.5 - 1.4 mg/dL    Calcium 9.8 8.7 - 10.5 mg/dL    Anion Gap 12 8 - 16 mmol/L    eGFR 40 (A) >60 mL/min/1.73 m^2   POCT glucose    Collection Time: 01/26/25  6:24 AM   Result Value Ref Range    POCT Glucose 293 (H) 70 - 110 mg/dL   POCT glucose    Collection Time: 01/26/25  9:47 AM   Result Value Ref Range    POCT Glucose 399 (H) 70 - 110 mg/dL   POCT glucose    Collection Time: 01/26/25 11:47 AM   Result Value Ref Range    POCT Glucose 388 (H) 70 - 110 mg/dL        LABS:  Reviewed Yes      Intake/Output Summary (Last 24 hours) at 1/26/2025 1215  Last data filed at 1/25/2025 2200  Gross per 24 hour   Intake 240 ml   Output 1 ml   Net 239 ml       Assessment and Plan       ANGELITO/CKD stage 4 in his transplant kidney.  Premium to be secondary to possible intravascular volume depletion complicated by Prograf toxicity.   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  Followed by Dr. Gonsalez.  As above.  Continue immunosuppression with CellCept, Prograf and prednisone.  Prograf trough level 1/20 likely drawn a bit early and not accurate.  Given his time out from transplant and the fact that he has a living related donor kidney transplant, a Prograf trough level of 4-6 would be adequate.  Repeat trough from this morning 5.8. his last Prograf level is within goal.         Right knee pain.  He had aspiration by Orthopedics and fluid analysis consistent with gout.  He is currently on prednisone tapering doses and also start him on allopurinol.     Gross hematuria.  Likely Ann trauma.  Anticoagulants on hold.  This appears to be resolved.    Metabolic acidosis.  I will start him on sodium bicarb supplements.     Hyperkalemia.   Resolved.     Hypercalcemia.  Corrected calcium 11.56.  Electrophoresis negative.  He has been on calcitriol outpatient which is being held here.  Avoid calcium and vitamin-D.   His ionized calcium in the normal range.     Anemia.  Hematuria versus possible GI bleed while on Eliquis and aspirin.  Status post 1 unit of PRBCs and hemoglobin relatively stable at this point.     Hypertension CKD.  His blood pressure is slightly low and on low-dose beta-blockers.  He is asymptomatic.     HFrEF.  Currently compensated.  EF 35% 11/2024.  Followed by Dr. Man with Cardiology.     Diabetes mellitus type 2.  Defer to Hospital Medicine.              ________________________________________________  Yoandy AYALA  Basireddy

## 2025-01-26 NOTE — ASSESSMENT & PLAN NOTE
01/17/2025  Patient requiring max assists with bed transitions and feeding. High intensity therapy recommended by therapists. Patient previously independent, living at home. Referrals for rehab placed at this time.      01/18/2025  Some notable objective clinical improvement. Reports decreased ROM in UE bilaterally but predominantly his right UE. Radiography of the left hand and R shoulder unremarkable. Will obtain CT cervical spine to further assess for stenosis.     01/19/2025  Cervical spine CT shows right moderate foraminal stenosis at C3-4 and C5-6 secondary to spurring of the uncovertebral joints.  Will obtain MRI of spine and consult orthospine to assess to see if findings could be contributing to his significant UE weakness and pain.     1/20/2025  Patient's daughter tells me patient independently prior to hospitalization. Patient complains of upper and lower bilateral weakness. While Prograf toxicity could play a part in progressive decline, he will need additional work up. CT head unremarkable. Kidney function has returned to baseline.  Vitamin B12 pending. Neurosurgery/Neurology consult pending. He is awaiting rehab vs SNF placement.     1/21/2025  Neurology input appreciated  Follow Vitamin B12, Thiamine, Copper  Continue PT/OT  Awaiting SNF consultation    1/23/25  S/p knee aspiration today.   Cell count unremarkable  Awaiting gram stain, crystals, and culture  Continue PT/TO.   SNF  placement pending    1/24/25  Treat gout  Awaiting SNF    1/25/25  Doing better today  Moving upper and lower extremities  Continue PT/TO  Awaiting SNF placement    1/26/25  Gradually improving. He was able to sit up on side of bed with standby assistance. Continue PT/TO. Awaiting SNF placement.     1/27/2025  Awaiting SNF placement

## 2025-01-26 NOTE — ASSESSMENT & PLAN NOTE
Patient's FSGs are controlled on current medication regimen.  Last A1c reviewed-   Lab Results   Component Value Date    HGBA1C 5.7 (H) 12/17/2024     Most recent fingerstick glucose reviewed-   Recent Labs   Lab 01/25/25  1737 01/25/25  2109 01/26/25  0624 01/26/25  0947   POCTGLUCOSE 369* 325* 293* 399*     Current correctional scale  Medium  Maintain anti-hyperglycemic dose as follows-   Antihyperglycemics (From admission, onward)      Start     Stop Route Frequency Ordered    01/26/25 1000  insulin glargine U-100 (Lantus) pen 15 Units         -- SubQ 2 times daily 01/26/25 0922    01/14/25 1503  insulin aspart U-100 pen 0-10 Units         -- SubQ Before meals & nightly PRN 01/14/25 1404        1/26/25  Glucose usually controlled when he was on prednisone 5mg daily. Steroid increased due to gouty arthritis and has required optimization of sliding scale long acting insulin. I suspect his glucose will stabilize when steroids are tapered.

## 2025-01-26 NOTE — ASSESSMENT & PLAN NOTE
X-ray from 1/6/25 shows soft tissue swelling, degenerative changes, and small effusion.   Continues to have persistent pain and decreased ROM  S/p aspiration. Cell count does not suggest infection  Gram stain, culture, and crystal are pending  Previous uric acid level elevated  Give Colchicine 0.6 mg PO x 1 dose and monitor response.    1/24/25  Aspiration consistent with gout  Increase prednisone to 20mg BID    1/25/25  Improving with steroids (plan for 5 days then taper back to 5mg daily)  Allopurinol started    1/26/25  Related to gouty arthritis  Improving. Will need to continue management above

## 2025-01-26 NOTE — PLAN OF CARE
A242/A242 SB Whittaker is a 69 y.o.male admitted on 1/12/2025 for Physical deconditioning   Code Status: Full Code MRN: [id number]   Review of patient's allergies indicates:   Allergen Reactions    Lisinopril Other (See Comments)     Other reaction(s):  cough    Actos  [pioglitazone] Other (See Comments)     Other reaction(s): CHF    Metformin Other (See Comments)     Other reaction(s): renal insuff  Other reaction(s): CHF     Past Medical History:   Diagnosis Date    Acquired renal cyst of left kidney     Anemia associated with chronic renal failure     CAD (coronary artery disease)     nonobstructive [facility] 9/14    CHF (congestive heart failure)     Chronic immunosuppression with Prograf and MMF 06/18/2015    Chronic venous insufficiency of lower extremity     CKD (chronic kidney disease) stage 3, GFR 30-59 ml/min     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 RNA NEGATIVE 5/2015, 2021, 2022    History of colon polyps     HPTH (hyperparathyroidism)     Hyperlipidemia     Hypertension associated with stage 3 chronic kidney disease due to type 2 diabetes mellitus     LBBB (left bundle branch block) 12/20/2021    Morbid obesity with BMI of 45.0-49.9, adult     Nephrolithiasis 6/7/2013    PCO (posterior capsular opacification), left 03/04/2019    Proteinuria     resolved s/p transplant    S/P kidney transplant     Sleep apnea     Type 2 diabetes, uncontrolled, with retinopathy     Type II diabetes mellitus with renal manifestations       PRN meds    0.9%  NaCl infusion (for blood administration), , Q24H PRN  acetaminophen, 650 mg, Q4H PRN  dextrose 50%, 12.5 g, PRN  dextrose 50%,  25 g, PRN  glucagon (human recombinant), 1 mg, PRN  glucose, 16 g, PRN  glucose, 24 g, PRN  HYDROcodone-acetaminophen, 1 tablet, Q6H PRN  insulin aspart U-100, 0-10 Units, QID (AC + HS) PRN  melatonin, 6 mg, Nightly PRN  metoprolol, 5 mg, Q6H PRN  naloxone, 0.02 mg, PRN  ondansetron, 8 mg, Q8H PRN  ondansetron, 4 mg, Q8H PRN  polyethylene glycol, 17 g, Daily PRN  senna-docusate 8.6-50 mg, 2 tablet, Daily PRN      Chart check completed. Will continue plan of care.      Orientation: oriented x 4  Fulda Coma Scale Score: 15     Lead Monitored: Lead II Rhythm: normal sinus rhythm Frequency/Ectopy: PVCs  Cardiac/Telemetry Box Number: [id number]  VTE Core Measure: Pharmacological prophylaxis initiated/maintained Last Bowel Movement: 01/25/25  Diet Renal Renal, Soft & Bite Sized (IDDSI Level 6); Standard Tray  Voiding Characteristics: incontinence  Dewayne Score: 11  Fall Risk Score: 24  Accucheck []   Freq?      Lines/Drains/Airways       Drain  Duration             Male External Urinary Catheter 01/23/25 1445 Medium 2 days              Peripheral Intravenous Line  Duration                  Hemodialysis AV Fistula Right upper arm -- days         Peripheral IV - Single Lumen 01/22/25 1426 18 G No Anterior;Left Shoulder 3 days

## 2025-01-26 NOTE — ASSESSMENT & PLAN NOTE
Hyperkalemia is likely due to Increased potassium intake.The patients most recent potassium results are listed below.  Recent Labs     01/24/25  0446 01/25/25  0445 01/26/25  0447   K 4.5 5.0 5.3*       01/26/2025  Slightly elevated today. Give Lokelma x 1 dose

## 2025-01-26 NOTE — ASSESSMENT & PLAN NOTE
"Patient has Combined Systolic and Diastolic heart failure that is Chronic. On presentation their CHF was  euvolemic . Most recent BNP and echo results are listed below.  No results for input(s): "BNP" in the last 72 hours.  Latest ECHO  Results for orders placed during the hospital encounter of 11/26/24    Echo    Interpretation Summary    Left Ventricle: The left ventricle is normal in size. Normal wall thickness. There is concentric hypertrophy. Normal wall motion. Septal motion is consistent with bundle branch block. There is moderately reduced systolic function. Ejection fraction is approximately 35%. Grade I diastolic dysfunction.    Right Ventricle: Normal right ventricular cavity size. Wall thickness is normal. Systolic function is normal.    IVC/SVC: Normal venous pressure at 3 mmHg.    Current Heart Failure Medications  metoprolol succinate (TOPROL-XL) 24 hr tablet 25 mg, Daily, Oral    Plan  - Monitor strict I&Os and daily weights.    - Place on telemetry  - Low sodium diet  - Place on fluid restriction of 1.5 L.   - Cardiology has not been consulted  - The patient's volume status is  stable    1/26/25  Episode of dyspnea yesterday. CXR shows early CHF. EF 35% with DD. He was given Lasix IV x 1 dose,. Patient was previously taking 40m BID, but this was held on admit given acute on chronic renal failure. His creatinine is now lower than usual baseline of 2.2.   Plan:   Start Lasix 40mg daily to maintain fluid balance  "

## 2025-01-26 NOTE — PT/OT/SLP PROGRESS
"Physical Therapy  Treatment    Mitch Whittaker   MRN: [id number]   Admitting Diagnosis: Physical deconditioning    PT Received On: 01/26/25  PT Start Time: 0940     PT Stop Time: 1010    PT Total Time (min): 30 min       Billable Minutes:  Therapeutic Activity 15 and Therapeutic Exercise 15    Treatment Type: Treatment  PT/PTA: PTA     Number of PTA visits since last PT visit: 2       General Precautions: Standard, aphasia, respiratory  Orthopedic Precautions: N/A  Braces: N/A  Respiratory Status: Nasal cannula, flow 2 L/min    Spiritual, Cultural Beliefs, Religion Practices, Values that Affect Care: no    Subjective:  Communicated with nurse and NP prior to session.  Pt agreed to tx and reports feeling well. "I had trouble breathing last night"     Pain/Comfort  Pain Rating 1: 3/10  Location - Side 1: Right  Location 1: shoulder  Pain Addressed 1: Distraction, Reposition  Pain Rating Post-Intervention 1: 3/10    Objective:   Patient found with: oxygen, telemetry, peripheral IV, pressure relief boots (2 lpm)    Functional Mobility:  Bed Mobility:     Supine to sit: MOD A X 2   Sit to supine: MOD A x 2   Rolling: MAX to the Right   Seated scooting  laterally: MAX A x 2       Transfers:    Not attempted    Gait:     Unable.           Treatment and Education:  Pt sat eob20 mine.  Initially required the assist of a bed rail and MIN A but after anterior reaching exercises 5 reps each side. He was able to sit with close sba . Curved posture.     Le therex for strengthening and rom : aarom bilateral in sitting: ap, tke, hF.     Seated scooting to the L for improved positioning and comfort. A blanket was utilized around the bed frame for comfort , pt feels like the frame digs in to legs with EOB sitting.     AM-PAC 6 CLICK MOBILITY  How much help from another person does this patient currently need?   1 = Unable, Total/Dependent Assistance  2 = A lot, Maximum/Moderate Assistance  3 = A little, Minimum/Contact " Guard/Supervision  4 = None, Modified [place]/Independent    Turning over in bed (including adjusting bedclothes, sheets and blankets)?: 2  Sitting down on and standing up from a chair with arms (e.g., wheelchair, bedside commode, etc.): 2  Moving from lying on back to sitting on the side of the bed?: 2  Moving to and from a bed to a chair (including a wheelchair)?: 1  Need to walk in hospital room?: 1  Climbing 3-5 steps with a railing?: 1  Basic Mobility Total Score: 9    AM-PAC Raw Score CMS G-Code Modifier Level of Impairment Assistance   6 % Total / Unable   7 - 9 CM 80 - 100% Maximal Assist   10 - 14 CL 60 - 80% Moderate Assist   15 - 19 CK 40 - 60% Moderate Assist   20 - 22 CJ 20 - 40% Minimal Assist   23 CI 1-20% SBA / CGA   24 CH 0% Independent/ Mod I     Patient left HOB elevated with all lines intact, call button in reach, and nurse notified.    Assessment:  Mitch Whittaker is a 69 y.o. male with a medical diagnosis of Physical deconditioning and presents with improved pain today and required less assistance for all mobility. Motivated today and eager to move. Progressing.     Rehab identified problem list/impairments: weakness, gait instability, decreased upper extremity function, decreased ROM, impaired endurance, impaired balance, decreased lower extremity function, impaired coordination, impaired self care skills, impaired functional mobility, decreased coordination, edema, impaired skin, pain, impaired muscle length, decreased safety awareness, impaired joint extensibility, impaired cardiopulmonary response to activity    Rehab potential is good.    Activity tolerance: Good    Discharge recommendations: Moderate Intensity Therapy      Barriers to discharge:      Equipment recommendations: other (see comments) (to be determined)     GOALS:   Multidisciplinary Problems       Physical Therapy Goals          Problem: Physical Therapy    Goal Priority Disciplines Outcome Interventions    Physical Therapy Goal     PT, PT/OT Progressing    Description: All LTGs to be met by 2/1/25    Bed mobility Sba   Transfers SBA   Gait of at least 100 feet SBA   Pt will increase AMPAC score by 2 points to progress functional mobility  Pt will tolerate > 10 min of functional activity to progress gross functional mobility                        DME Justifications:  No DME recommended requiring DME justifications    PLAN:    Patient to be seen 3 x/week to address the above listed problems via therapeutic activities, therapeutic exercises, gait training  Plan of Care expires: 02/01/25  Plan of Care reviewed with: patient (nurse and NP in the room)         01/26/2025

## 2025-01-26 NOTE — ASSESSMENT & PLAN NOTE
Creatine stable for now. BMP reviewed- noted Estimated Creatinine Clearance: 48.8 mL/min (A) (based on SCr of 1.8 mg/dL (H)). according to latest data. Based on current GFR, CKD stage is stage 4 - GFR 15-29.  Monitor UOP and serial BMP and adjust therapy as needed. Renally dose meds. Avoid nephrotoxic medications and procedures.  Pt is d/p Renal Transplant in 2015, on Prograf and Cellcept    Edwina on CKD 4- sec to Prograf toxicity- Bleeding- started on IVF  Cont IVF  Improving with IVF, Cr coming down to 3    01/26/2025  Stable, 1.8

## 2025-01-26 NOTE — SUBJECTIVE & OBJECTIVE
Interval History: doing well today. Sitting up on side of bed with standby assistance. Significant improvement.     Review of Systems   Constitutional:  Positive for activity change and appetite change. Negative for fever.   HENT:  Negative for hearing loss.    Eyes:  Negative for visual disturbance.   Respiratory:  Negative for cough and shortness of breath.    Cardiovascular:  Positive for leg swelling (bilateral knee pain improving). Negative for chest pain and palpitations.   Genitourinary:  Negative for difficulty urinating, dysuria, frequency and hematuria.   Musculoskeletal:  Positive for arthralgias. Negative for back pain.   Skin:  Positive for wound. Negative for color change and pallor.   Neurological:  Positive for weakness. Negative for seizures, syncope, numbness and headaches.   Hematological:  Does not bruise/bleed easily.   Psychiatric/Behavioral:  The patient is not nervous/anxious.      Objective:     Vital Signs (Most Recent):  Temp: 97.7 °F (36.5 °C) (01/26/25 0434)  Pulse: 79 (01/26/25 0726)  Resp: 16 (01/26/25 0726)  BP: (!) 112/53 (01/26/25 0726)  SpO2: 100 % (01/26/25 0726) Vital Signs (24h Range):  Temp:  [97.1 °F (36.2 °C)-98 °F (36.7 °C)] 97.7 °F (36.5 °C)  Pulse:  [] 79  Resp:  [16-20] 16  SpO2:  [96 %-100 %] 100 %  BP: (108-146)/(53-67) 112/53     Weight: 130.2 kg (287 lb 0.6 oz)  Body mass index is 44.96 kg/m².    Intake/Output Summary (Last 24 hours) at 1/26/2025 1059  Last data filed at 1/25/2025 2200  Gross per 24 hour   Intake 240 ml   Output 1 ml   Net 239 ml         Physical Exam  Constitutional:       General: He is not in acute distress.     Appearance: Normal appearance. He is well-developed. He is obese. He is not ill-appearing, toxic-appearing or diaphoretic.   HENT:      Head: Normocephalic and atraumatic.   Eyes:      Extraocular Movements: Extraocular movements intact.   Cardiovascular:      Rate and Rhythm: Normal rate and regular rhythm.      Heart sounds: Normal  heart sounds. No murmur heard.  Pulmonary:      Effort: Pulmonary effort is normal. No respiratory distress.      Breath sounds: Normal breath sounds. No wheezing.   Abdominal:      General: Bowel sounds are normal. There is no distension.      Palpations: Abdomen is soft. There is no mass.      Tenderness: There is no abdominal tenderness.   Musculoskeletal:         General: Swelling present.      Cervical back: Normal range of motion and neck supple.      Right lower leg: Edema (improving) present.      Left lower leg: Edema (improving) present.      Comments: Elbow and knee tenderness improving     Skin:     General: Skin is warm and dry.      Capillary Refill: Capillary refill takes 2 to 3 seconds.   Neurological:      General: No focal deficit present.      Mental Status: He is alert and oriented to person, place, and time.      Cranial Nerves: No cranial nerve deficit.      Motor: Weakness present.   Psychiatric:         Mood and Affect: Mood normal.         Behavior: Behavior normal.         Thought Content: Thought content normal.         Judgment: Judgment normal.             Significant Labs: All pertinent labs within the past 24 hours have been reviewed.  CBC:   Recent Labs   Lab 01/25/25  0445 01/26/25  0447   WBC 3.70* 3.65*   HGB 8.6* 8.9*   HCT 30.3* 31.6*    379     CMP:   Recent Labs   Lab 01/25/25  0445 01/26/25  0447    139   K 5.0 5.3*    109   CO2 22* 18*   * 300*   BUN 46* 49*   CREATININE 1.8* 1.8*   CALCIUM 9.8 9.8   ANIONGAP 10 12       Significant Imaging:   XR CHEST AP PORTABLE     CLINICAL HISTORY:  SOB;     TECHNIQUE:  Single frontal view of the chest was performed.     COMPARISON:  None     FINDINGS:  Cardiomegaly with mild perihilar central pulmonary vascular crowding.  Low lung volumes.  Element of low-grade CHF cannot be excluded.     Bones are intact.     Impression:     As above        Electronically signed by:Daniele Whatley  Date:                                             01/25/2025  Time:                                           18:41

## 2025-01-26 NOTE — NURSING
Pt complaint of SOB, entered room with pt AAOx4, pt on room air, telemetry box intact.  Dr. Mathew and MAGALIE Maravilla made aware of pt complaint. Bedside visit complete by MAGALIE Maravilla. Verbal order for chest xray by Dr. Mathew  and completed. Vitals signs complete. Pt placed on 2 liters of oxygen for comfort.

## 2025-01-27 LAB
ALBUMIN SERPL BCP-MCNC: 1.8 G/DL (ref 3.5–5.2)
ANION GAP SERPL CALC-SCNC: 13 MMOL/L (ref 8–16)
ANION GAP SERPL CALC-SCNC: 13 MMOL/L (ref 8–16)
BACTERIA BLD CULT: NORMAL
BASOPHILS NFR BLD: 0 % (ref 0–1.9)
BUN SERPL-MCNC: 54 MG/DL (ref 8–23)
BUN SERPL-MCNC: 54 MG/DL (ref 8–23)
CALCIUM SERPL-MCNC: 9.4 MG/DL (ref 8.7–10.5)
CALCIUM SERPL-MCNC: 9.4 MG/DL (ref 8.7–10.5)
CHLORIDE SERPL-SCNC: 108 MMOL/L (ref 95–110)
CHLORIDE SERPL-SCNC: 108 MMOL/L (ref 95–110)
CO2 SERPL-SCNC: 20 MMOL/L (ref 23–29)
CO2 SERPL-SCNC: 20 MMOL/L (ref 23–29)
CREAT SERPL-MCNC: 1.9 MG/DL (ref 0.5–1.4)
CREAT SERPL-MCNC: 1.9 MG/DL (ref 0.5–1.4)
DACRYOCYTES BLD QL SMEAR: ABNORMAL
DIFFERENTIAL METHOD BLD: ABNORMAL
EOSINOPHIL NFR BLD: 0 % (ref 0–8)
ERYTHROCYTE [DISTWIDTH] IN BLOOD BY AUTOMATED COUNT: 14.2 % (ref 11.5–14.5)
EST. GFR  (NO RACE VARIABLE): 37 ML/MIN/1.73 M^2
EST. GFR  (NO RACE VARIABLE): 37 ML/MIN/1.73 M^2
GLUCOSE SERPL-MCNC: 301 MG/DL (ref 70–110)
GLUCOSE SERPL-MCNC: 301 MG/DL (ref 70–110)
HCT VFR BLD AUTO: 32.9 % (ref 40–54)
HGB BLD-MCNC: 9.1 G/DL (ref 14–18)
IMM GRANULOCYTES # BLD AUTO: ABNORMAL K/UL (ref 0–0.04)
IMM GRANULOCYTES NFR BLD AUTO: ABNORMAL % (ref 0–0.5)
LYMPHOCYTES NFR BLD: 10 % (ref 18–48)
MCH RBC QN AUTO: 23.7 PG (ref 27–31)
MCHC RBC AUTO-ENTMCNC: 27.7 G/DL (ref 32–36)
MCV RBC AUTO: 86 FL (ref 82–98)
METAMYELOCYTES NFR BLD MANUAL: 1 %
MONOCYTES NFR BLD: 3 % (ref 4–15)
MYELOCYTES NFR BLD MANUAL: 2 %
NEUTROPHILS NFR BLD: 79 % (ref 38–73)
NEUTS BAND NFR BLD MANUAL: 5 %
NRBC BLD-RTO: 1 /100 WBC
OVALOCYTES BLD QL SMEAR: ABNORMAL
PHOSPHATE SERPL-MCNC: 4.3 MG/DL (ref 2.7–4.5)
PLATELET # BLD AUTO: 408 K/UL (ref 150–450)
PLATELET BLD QL SMEAR: ABNORMAL
PMV BLD AUTO: 10.5 FL (ref 9.2–12.9)
POCT GLUCOSE: 278 MG/DL (ref 70–110)
POCT GLUCOSE: 288 MG/DL (ref 70–110)
POCT GLUCOSE: 291 MG/DL (ref 70–110)
POCT GLUCOSE: 317 MG/DL (ref 70–110)
POIKILOCYTOSIS BLD QL SMEAR: SLIGHT
POTASSIUM SERPL-SCNC: 4.4 MMOL/L (ref 3.5–5.1)
POTASSIUM SERPL-SCNC: 4.4 MMOL/L (ref 3.5–5.1)
RBC # BLD AUTO: 3.84 M/UL (ref 4.6–6.2)
SODIUM SERPL-SCNC: 141 MMOL/L (ref 136–145)
SODIUM SERPL-SCNC: 141 MMOL/L (ref 136–145)
WBC # BLD AUTO: 4.16 K/UL (ref 3.9–12.7)

## 2025-01-27 PROCEDURE — 80069 RENAL FUNCTION PANEL: CPT | Performed by: INTERNAL MEDICINE

## 2025-01-27 PROCEDURE — 97530 THERAPEUTIC ACTIVITIES: CPT

## 2025-01-27 PROCEDURE — 97110 THERAPEUTIC EXERCISES: CPT

## 2025-01-27 PROCEDURE — A4216 STERILE WATER/SALINE, 10 ML: HCPCS | Performed by: EMERGENCY MEDICINE

## 2025-01-27 PROCEDURE — 25000003 PHARM REV CODE 250: Performed by: STUDENT IN AN ORGANIZED HEALTH CARE EDUCATION/TRAINING PROGRAM

## 2025-01-27 PROCEDURE — 25000003 PHARM REV CODE 250: Performed by: INTERNAL MEDICINE

## 2025-01-27 PROCEDURE — 99900035 HC TECH TIME PER 15 MIN (STAT)

## 2025-01-27 PROCEDURE — 85027 COMPLETE CBC AUTOMATED: CPT | Performed by: NURSE PRACTITIONER

## 2025-01-27 PROCEDURE — 25000003 PHARM REV CODE 250: Performed by: EMERGENCY MEDICINE

## 2025-01-27 PROCEDURE — 99232 SBSQ HOSP IP/OBS MODERATE 35: CPT | Mod: ,,, | Performed by: INTERNAL MEDICINE

## 2025-01-27 PROCEDURE — 63600175 PHARM REV CODE 636 W HCPCS: Performed by: NURSE PRACTITIONER

## 2025-01-27 PROCEDURE — 25000003 PHARM REV CODE 250: Performed by: NURSE PRACTITIONER

## 2025-01-27 PROCEDURE — 21400001 HC TELEMETRY ROOM

## 2025-01-27 PROCEDURE — 25000003 PHARM REV CODE 250: Performed by: HOSPITALIST

## 2025-01-27 PROCEDURE — 25000003 PHARM REV CODE 250: Performed by: PHYSICIAN ASSISTANT

## 2025-01-27 PROCEDURE — 63600175 PHARM REV CODE 636 W HCPCS: Performed by: EMERGENCY MEDICINE

## 2025-01-27 PROCEDURE — 85007 BL SMEAR W/DIFF WBC COUNT: CPT | Performed by: NURSE PRACTITIONER

## 2025-01-27 PROCEDURE — 36415 COLL VENOUS BLD VENIPUNCTURE: CPT | Performed by: INTERNAL MEDICINE

## 2025-01-27 RX ORDER — INSULIN GLARGINE 100 [IU]/ML
20 INJECTION, SOLUTION SUBCUTANEOUS 2 TIMES DAILY
Status: DISCONTINUED | OUTPATIENT
Start: 2025-01-27 | End: 2025-01-29 | Stop reason: HOSPADM

## 2025-01-27 RX ADMIN — NYSTATIN 500000 UNITS: 100000 SUSPENSION ORAL at 12:01

## 2025-01-27 RX ADMIN — Medication 10 ML: at 09:01

## 2025-01-27 RX ADMIN — PANTOPRAZOLE SODIUM 40 MG: 40 TABLET, DELAYED RELEASE ORAL at 09:01

## 2025-01-27 RX ADMIN — Medication 10 ML: at 02:01

## 2025-01-27 RX ADMIN — GABAPENTIN 300 MG: 300 CAPSULE ORAL at 09:01

## 2025-01-27 RX ADMIN — NYSTATIN 500000 UNITS: 100000 SUSPENSION ORAL at 09:01

## 2025-01-27 RX ADMIN — SODIUM BICARBONATE 650 MG TABLET 650 MG: at 09:01

## 2025-01-27 RX ADMIN — HEPARIN SODIUM 5000 UNITS: 5000 INJECTION INTRAVENOUS; SUBCUTANEOUS at 09:01

## 2025-01-27 RX ADMIN — INSULIN GLARGINE 20 UNITS: 100 INJECTION, SOLUTION SUBCUTANEOUS at 09:01

## 2025-01-27 RX ADMIN — INSULIN GLARGINE 15 UNITS: 100 INJECTION, SOLUTION SUBCUTANEOUS at 09:01

## 2025-01-27 RX ADMIN — TAMSULOSIN HYDROCHLORIDE 0.4 MG: 0.4 CAPSULE ORAL at 09:01

## 2025-01-27 RX ADMIN — CHOLESTYRAMINE 4 G: 4 POWDER, FOR SUSPENSION ORAL at 09:01

## 2025-01-27 RX ADMIN — PREDNISONE 20 MG: 20 TABLET ORAL at 09:01

## 2025-01-27 RX ADMIN — CIPROFLOXACIN HYDROCHLORIDE 1 DROP: 3 SOLUTION/ DROPS OPHTHALMIC at 09:01

## 2025-01-27 RX ADMIN — INSULIN ASPART 6 UNITS: 100 INJECTION, SOLUTION INTRAVENOUS; SUBCUTANEOUS at 05:01

## 2025-01-27 RX ADMIN — INSULIN ASPART 3 UNITS: 100 INJECTION, SOLUTION INTRAVENOUS; SUBCUTANEOUS at 09:01

## 2025-01-27 RX ADMIN — HEPARIN SODIUM 5000 UNITS: 5000 INJECTION INTRAVENOUS; SUBCUTANEOUS at 05:01

## 2025-01-27 RX ADMIN — MYCOPHENOLATE MOFETIL 500 MG: 500 TABLET, FILM COATED ORAL at 09:01

## 2025-01-27 RX ADMIN — ALLOPURINOL 100 MG: 100 TABLET ORAL at 09:01

## 2025-01-27 RX ADMIN — NYSTATIN 500000 UNITS: 100000 SUSPENSION ORAL at 05:01

## 2025-01-27 RX ADMIN — Medication 10 ML: at 05:01

## 2025-01-27 RX ADMIN — METOPROLOL SUCCINATE 25 MG: 25 TABLET, FILM COATED, EXTENDED RELEASE ORAL at 09:01

## 2025-01-27 RX ADMIN — INSULIN ASPART 8 UNITS: 100 INJECTION, SOLUTION INTRAVENOUS; SUBCUTANEOUS at 07:01

## 2025-01-27 RX ADMIN — INSULIN ASPART 6 UNITS: 100 INJECTION, SOLUTION INTRAVENOUS; SUBCUTANEOUS at 12:01

## 2025-01-27 RX ADMIN — HEPARIN SODIUM 5000 UNITS: 5000 INJECTION INTRAVENOUS; SUBCUTANEOUS at 02:01

## 2025-01-27 RX ADMIN — TACROLIMUS 3 MG: 1 CAPSULE ORAL at 09:01

## 2025-01-27 RX ADMIN — SODIUM BICARBONATE 650 MG TABLET 650 MG: at 02:01

## 2025-01-27 RX ADMIN — GABAPENTIN 300 MG: 300 CAPSULE ORAL at 02:01

## 2025-01-27 RX ADMIN — LIDOCAINE 5% 2 PATCH: 700 PATCH TOPICAL at 09:01

## 2025-01-27 RX ADMIN — FUROSEMIDE 20 MG: 20 TABLET ORAL at 09:01

## 2025-01-27 RX ADMIN — TACROLIMUS 3 MG: 1 CAPSULE ORAL at 05:01

## 2025-01-27 RX ADMIN — ONDANSETRON 4 MG: 2 INJECTION INTRAMUSCULAR; INTRAVENOUS at 05:01

## 2025-01-27 NOTE — PLAN OF CARE
Pt tolerated interventions well. Required MAX A of 2 for bed mobility. Recommending moderate intensity therapy upon d/c.

## 2025-01-27 NOTE — PROGRESS NOTES
Nephrology Progress Note     History of Present Illness     Mr. Whittaker is a 69 year  male with a hx of previous ESRD likely related to diabetes and hypertension that was on dialysis several years prior to receiving a living related kidney transplant from his daughter in 2015.  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  He is followed by Dr. Gonsalez of [facility] seen last on 09/24/2024 at which time his serum creatinine was 2.2.     He was seen in the emergency department on 01/06/2025 complaining of increasing lower extremity edema.  At that time his serum creatinine was 2.8.  He returned to the emergency department 01/12/2025 with persistent lower extremity edema and associated blistering.  He also complains of bilateral knee pain to the point where he is unable to ambulate.  His admission laboratory reveals a serum creatinine now up to 4.3 with a potassium of 5.4.  Nephrology has been asked to see and evaluate the patient.     As an outpatient he is chronically on Lasix 40 mg twice a day.  He denies the use of nonsteroidal anti-inflammatory drugs.  He denies dysuria hematuria or difficulty voiding.  He is chronically on Entresto.    Aspiration by Orthopedics consistent with gouty arthropathy    Interval History     Overnight/currently:  Course reviewed.  Patient denies chest pain or shortness of breath.  His appetite is well maintained with no nausea or vomiting.  As noted his serum creatinine is back to baseline.  He apparently has not been out of bed much with plans to transition to a skilled nursing or rehab facility.     Health Status   Allergies:    is allergic to lisinopril, actos  [pioglitazone], and metformin.    Current medications:     Current  Facility-Administered Medications:     0.9%  NaCl infusion (for blood administration), , Intravenous, Q24H PRN, Carlos Mathew MD    acetaminophen tablet 650 mg, 650 mg, Oral, Q4H PRN, Jaymie Medley MD, 650 mg at 01/26/25 0930    allopurinoL tablet 100 mg, 100 mg, Oral, Daily, Yoandy Bennett MD, 100 mg at 01/27/25 0909    cholestyramine 4 gram packet 4 g, 1 packet, Oral, BID, Darya Maravilla, NP, 4 g at 01/27/25 0907    ciprofloxacin HCl 0.3 % ophthalmic solution 1 drop, 1 drop, Left Eye, BID, Darya Maravilla NP, 1 drop at 01/27/25 0910    dextrose 50% injection 12.5 g, 12.5 g, Intravenous, PRN, Jaymie Medley MD    dextrose 50% injection 25 g, 25 g, Intravenous, PRN, Jaymie Medley MD    furosemide tablet 20 mg, 20 mg, Oral, Daily, Darya Maravilla, NP, 20 mg at 01/27/25 0910    gabapentin capsule 300 mg, 300 mg, Oral, TID, Chidi James MD, 300 mg at 01/27/25 0903    glucagon (human recombinant) injection 1 mg, 1 mg, Intramuscular, PRN, Jaymie Medley MD    glucose chewable tablet 16 g, 16 g, Oral, PRNGalindo Majid A., MD    glucose chewable tablet 24 g, 24 g, Oral, PRNGalindo Majid A., MD    heparin (porcine) injection 5,000 Units, 5,000 Units, Subcutaneous, Q8H, Darya Maravilla NP, 5,000 Units at 01/27/25 0539    HYDROcodone-acetaminophen 5-325 mg per tablet 1 tablet, 1 tablet, Oral, Q6H PRN, Jaymie Medley MD, 1 tablet at 01/24/25 1202    insulin aspart U-100 pen 0-10 Units, 0-10 Units, Subcutaneous, QID (AC + HS) PRN, Jaymie Medley MD, 6 Units at 01/27/25 1200    insulin glargine U-100 (Lantus) pen 20 Units, 20 Units, Subcutaneous, BID, Long Payan MD    LIDOcaine 5 % patch 2 patch, 2 patch, Transdermal, Q24H, Chidi James MD, 2 patch at 01/27/25 0901    melatonin tablet 6 mg, 6 mg, Oral, Nightly PRN, Jaymie Medley MD    metoprolol injection 5 mg, 5 mg, Intravenous, Q6H PRN, Dottie North, NP    metoprolol succinate (TOPROL-XL) 24 hr tablet 25 mg, 25 mg,  "Oral, Daily, Carlos Mathew MD, 25 mg at 01/27/25 0910    mycophenolate tablet 500 mg, 500 mg, Oral, BID, Jaymie Medley MD, 500 mg at 01/27/25 0906    naloxone 0.4 mg/mL injection 0.02 mg, 0.02 mg, Intravenous, PRN, Jaymie Medley MD    nystatin 100,000 unit/mL suspension 500,000 Units, 500,000 Units, Oral, QID, Chidi James MD, 500,000 Units at 01/27/25 1240    ondansetron disintegrating tablet 8 mg, 8 mg, Oral, Q8H PRN, Jaymie Medley MD, 8 mg at 01/26/25 1727    ondansetron injection 4 mg, 4 mg, Intravenous, Q8H PRN, Jaymie Medley MD, 4 mg at 01/26/25 1755    pantoprazole EC tablet 40 mg, 40 mg, Oral, BID, Hilario Dahl PA-C, 40 mg at 01/27/25 0910    polyethylene glycol packet 17 g, 17 g, Oral, Daily PRN, Jaymie Medley MD    predniSONE tablet 20 mg, 20 mg, Oral, BID, Darya Maravilla, NP, 20 mg at 01/27/25 0909    senna-docusate 8.6-50 mg per tablet 2 tablet, 2 tablet, Oral, Daily PRN, Jaymie Medley MD    sodium bicarbonate tablet 650 mg, 650 mg, Oral, TID, Yoandy Bennett MD, 650 mg at 01/27/25 0903    sodium chloride 0.9% flush 10 mL, 10 mL, Intravenous, Q8H, Jaymie Medley MD, 10 mL at 01/27/25 0539    tacrolimus capsule 3 mg, 3 mg, Oral, BID, Jaymie Medley MD, 3 mg at 01/27/25 0905    tamsulosin 24 hr capsule 0.4 mg, 0.4 mg, Oral, QHS, Elias Watkins MD, 0.4 mg at 01/26/25 2200     Physical Examination   VS/Measurements   BP (!) 109/55 (Patient Position: Lying)   Pulse 64   Temp 98 °F (36.7 °C) (Oral)   Resp 16   Ht 5' 7" (1.702 m)   Wt 130.2 kg (287 lb 0.6 oz)   SpO2 100%   BMI 44.96 kg/m²      General:  Alert and oriented X3, No acute distress.         Nutritional status: Obese.    Neck:  Supple, No lymphadenopathy.     Respiratory:  Lungs are clear to auscultation, Respirations are non-labored, Symmetrical chest wall expansion.    Cardiovascular:  Normal rate, Regular rhythm.  Trace to 1+ pretibial edema  Gastrointestinal:  Soft, Non-tender, Normal bowel " sounds.   Integumentary:  Warm, Dry.   Psychiatric:  Cooperative, Appropriate mood & affect.        Review / Management   Laboratory Results   Today's Lab Results :    Recent Results (from the past 24 hours)   POCT glucose    Collection Time: 01/26/25  3:20 PM   Result Value Ref Range    POCT Glucose 335 (H) 70 - 110 mg/dL   POCT glucose    Collection Time: 01/26/25  5:58 PM   Result Value Ref Range    POCT Glucose 330 (H) 70 - 110 mg/dL   POCT glucose    Collection Time: 01/26/25 10:10 PM   Result Value Ref Range    POCT Glucose 321 (H) 70 - 110 mg/dL   CBC Auto Differential    Collection Time: 01/27/25  4:59 AM   Result Value Ref Range    WBC 4.16 3.90 - 12.70 K/uL    RBC 3.84 (L) 4.60 - 6.20 M/uL    Hemoglobin 9.1 (L) 14.0 - 18.0 g/dL    Hematocrit 32.9 (L) 40.0 - 54.0 %    MCV 86 82 - 98 fL    MCH 23.7 (L) 27.0 - 31.0 pg    MCHC 27.7 (L) 32.0 - 36.0 g/dL    RDW 14.2 11.5 - 14.5 %    Platelets 408 150 - 450 K/uL    MPV 10.5 9.2 - 12.9 fL    Immature Granulocytes CANCELED 0.0 - 0.5 %    Immature Grans (Abs) CANCELED 0.00 - 0.04 K/uL    nRBC 1 (A) 0 /100 WBC    Gran % 79.0 (H) 38.0 - 73.0 %    Lymph % 10.0 (L) 18.0 - 48.0 %    Mono % 3.0 (L) 4.0 - 15.0 %    Eosinophil % 0.0 0.0 - 8.0 %    Basophil % 0.0 0.0 - 1.9 %    Bands 5.0 %    Metamyelocytes 1.0 %    Myelocytes 2.0 %    Platelet Estimate Appears normal     Poik Slight     Ovalocytes Occasional     Tear Drop Cells Occasional     Differential Method Manual    Basic Metabolic Panel    Collection Time: 01/27/25  4:59 AM   Result Value Ref Range    Sodium 141 136 - 145 mmol/L    Potassium 4.4 3.5 - 5.1 mmol/L    Chloride 108 95 - 110 mmol/L    CO2 20 (L) 23 - 29 mmol/L    Glucose 301 (H) 70 - 110 mg/dL    BUN 54 (H) 8 - 23 mg/dL    Creatinine 1.9 (H) 0.5 - 1.4 mg/dL    Calcium 9.4 8.7 - 10.5 mg/dL    Anion Gap 13 8 - 16 mmol/L    eGFR 37 (A) >60 mL/min/1.73 m^2   Renal Function Panel    Collection Time: 01/27/25  4:59 AM   Result Value Ref Range    Glucose 301 (H)  70 - 110 mg/dL    Sodium 141 136 - 145 mmol/L    Potassium 4.4 3.5 - 5.1 mmol/L    Chloride 108 95 - 110 mmol/L    CO2 20 (L) 23 - 29 mmol/L    BUN 54 (H) 8 - 23 mg/dL    Calcium 9.4 8.7 - 10.5 mg/dL    Creatinine 1.9 (H) 0.5 - 1.4 mg/dL    Albumin 1.8 (L) 3.5 - 5.2 g/dL    Phosphorus 4.3 2.7 - 4.5 mg/dL    eGFR 37 (A) >60 mL/min/1.73 m^2    Anion Gap 13 8 - 16 mmol/L   POCT glucose    Collection Time: 01/27/25  7:14 AM   Result Value Ref Range    POCT Glucose 317 (H) 70 - 110 mg/dL   POCT glucose    Collection Time: 01/27/25 11:51 AM   Result Value Ref Range    POCT Glucose 278 (H) 70 - 110 mg/dL        Impression and Plan   Diagnosis     ANGELITO [place] to be secondary to possible intravascular volume depletion complicated by Prograf toxicity.   His creatinine stable back to his baseline    CKD stage 3B in his transplant kidney.  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  Followed by Dr. Gonsalez.  As above.  Continue immunosuppression with CellCept, Prograf and prednisone. Given his time out from transplant and the fact that he has a living related donor kidney transplant, a Prograf trough level of 4-6 would be adequate.  Repeat trough from 01/23/2025 5.8. his last Prograf level is within goal.         Right knee pain.  He had aspiration by Orthopedics and fluid analysis consistent with gout.  He is currently on prednisone tapering doses and also start him on allopurinol.  --clinically much improved     Gross hematuria.  Likely Ann trauma.  Anticoagulants on hold.  This appears to be resolved.    Metabolic acidosis.  Improved on sodium bicarb supplements.     Hyperkalemia.   Resolved.     Hypercalcemia.  Corrected calcium elevated.  Electrophoresis negative.  He has been on calcitriol outpatient which is being held here.  Avoid calcium and vitamin-D.   His ionized calcium in the normal range.     Anemia.  Hematuria versus possible GI bleed while on Eliquis  and aspirin.  Status post 1 unit of PRBCs and hemoglobin relatively stable at this point.  --some contribution from his underlying CKD     Hypertension CKD.  His blood pressure is slightly low and on low-dose beta-blockers.  He is asymptomatic.     HFrEF.  Currently compensated.  EF 35% 11/2024.  Followed by Dr. Man with Cardiology.     Diabetes mellitus type 2.  Defer to Hospital Medicine.    Generalized debility-apparently waiting on a skilled nursing/rehab facility.  He needs more physical therapy.  At discharge he needs close follow-up with [facility].    ______________________________________________  Arturo Cooney      This document was created using voice recognition software.  It is possible that there are errors which have persisted after original proofreading.  If there is a question regarding contents of this document please contact me for clarification.

## 2025-01-27 NOTE — PT/OT/SLP PROGRESS
Physical Therapy Treatment    Patient Name:  Mitch Whittaker   MRN:  [id number]    Recommendations:     Discharge Recommendations: Moderate Intensity Therapy  Discharge Equipment Recommendations: to be determined by next level of care  Barriers to discharge: None    Assessment:     Mitch Whittaker is a 69 y.o. male admitted with a medical diagnosis of Physical deconditioning.  He presents with the following impairments/functional limitations: weakness, impaired endurance, impaired functional mobility, gait instability, impaired balance, pain, decreased safety awareness, decreased lower extremity function, decreased ROM, decreased coordination, impaired cardiopulmonary response to activity.    Rehab Prognosis: Fair; patient would benefit from acute skilled PT services to address these deficits and reach maximum level of function.    Recent Surgery: * No surgery found *      Plan:     During this hospitalization, patient to be seen 3 x/week to address the identified rehab impairments via gait training, therapeutic activities, therapeutic exercises and progress toward the following goals:    Plan of Care Expires:  02/01/25    Subjective     Chief Complaint: Pt is lethargic, pt agreed to participate  Patient/Family Comments/goals: None stated  Pain/Comfort:  Pain Rating 1: 8/10  Location - Side 1: Right  Location - Orientation 1: generalized  Location 1: wrist  Pain Addressed 1: Reposition, Distraction  Pain Rating Post-Intervention 1: 8/10  Pain Rating 2: 8/10  Location - Side 2: Bilateral  Location - Orientation 2: generalized  Location 2: knee  Pain Addressed 2: Reposition, Distraction, Pre-medicate for activity  Pain Rating Post-Intervention 2: 8/10      Objective:     Communicated with nurse Whipple and epic chart review prior to session.  Patient found left sidelying with peripheral IV, telemetry, PureWick, pressure relief boots, oxygen (LONI Mattress) upon PT entry to room.     General Precautions: Standard,  "fall  Orthopedic Precautions: N/A  Braces: UE brace  Respiratory Status: Nasal cannula, flow 2 L/min     Functional Mobility:  Gait Belt Applied - N/A   Socks/Shoes Donned - N/A  Bed Mobility  Rolling Left: maximal assistance  Scooting: maximal assistance  Supine to Sit: maximal assistance and of 2 persons for LE management and trunk management  Sit to Supine: maximal assistance and of 2 persons for LE management and trunk management  Supine Scooting: total A of 2  Transfers  Unable to progress, will progress as able  Balance  Sitting: stand by assistance  Pt sat EOB x15min to complete exercises, intermittent rest as needed.   LONI mattress deflated for safety sitting EOB, re-inflated once returned supine     Therapeutic Exercise  Patient performed 1 set(s) of 12 repetitions of the following seated exercises: ankle pumps, long arc quads, and marches for bilateral LE. Assistance needed to achieve full ROM.   Patient required skilled PT for instruction of exercises and appropriate cues to perform exercises safely and appropriately.      AM-PAC 6 CLICK MOBILITY  Turning over in bed (including adjusting bedclothes, sheets and blankets)?: 2  Sitting down on and standing up from a chair with arms (e.g., wheelchair, bedside commode, etc.): 1  Moving from lying on back to sitting on the side of the bed?: 2  Moving to and from a bed to a chair (including a wheelchair)?: 1  Need to walk in hospital room?: 1  Climbing 3-5 steps with a railing?: 1  Basic Mobility Total Score: 8       Treatment & Education:  Reviewed role of PT in acute care and POC. Pt tolerated interventions well. Reviewed importance of OOB activities, activity pacing, and HEP (marching/hip flex, hip abd, heel slides/LAQ, quad sets, ankle pumps) in order to maintain/regain strength. Encouraged to sit up for all meals. Reviewed "call don't fall" policy and increased risk of falling due to weakness, instructed to utilize call bell for assistance with all " transfers. Pt agreeable to all requests.    Patient left  left side-lying with bed in chair position with all lines intact, call button in reach, and family present..    GOALS:   Multidisciplinary Problems       Physical Therapy Goals          Problem: Physical Therapy    Goal Priority Disciplines Outcome Interventions   Physical Therapy Goal     PT, PT/OT Progressing    Description: All LTGs to be met by 2/1/25    Bed mobility Sba   Transfers SBA   Gait of at least 100 feet SBA   Pt will increase AMPAC score by 2 points to progress functional mobility  Pt will tolerate > 10 min of functional activity to progress gross functional mobility                        Time Tracking:     PT Received On: 01/27/25  PT Start Time: 1440     PT Stop Time: 1505  PT Total Time (min): 25 min     Billable Minutes: Therapeutic Activity 10min and Therapeutic Exercise 15min    Treatment Type: Treatment  PT/PTA: PT     Number of PTA visits since last PT visit: 0     01/27/2025

## 2025-01-27 NOTE — PROGRESS NOTES
[facility] Medicine  Progress Note    Patient Name: Mitch Whittaker  MRN: [id number]  Patient Class: IP- Inpatient   Admission Date: 1/12/2025  Length of Stay: 15 days  Attending Physician: Long Payan MD  Primary Care Provider: Valery Caal MD        Subjective     Principal Problem:Physical deconditioning        HPI:  Mitch Whittaker is a 69 y.o. male patient with a PMHx of CHF- EF 40%, anemia, hyperparathyroidism, type 2 DM, s/p kidney transplant 2015 on Prograf and Cellcept, DVT on Eliquis, MARION, HLD, HTN, CKD, Hep C, and arthritis who presents to the Emergency Department for evaluation of nausea vomiting and diarrhea over the past several days (both of which have improved), but felt weak and couldn't move around like normal. no abd pain, no systemic symptoms, stool now formed. Pt is a very poor historian. According to nursing staff, family reported that the pt has been sitting in his chair for the last 3 days. Pt reports that he has had two episodes of diarrhea since yesterday, but due to the increasing weakness in his knees he was unable to get up from his chair. Pt normally ambulates with assistance from a walker. Symptoms are constant and moderate in severity. Associated sxs include blood in stool (x4 days) and n/v yesterday, but none today after PO. Patient denies any fever, abdominal pain, appetite changes, SOB, dysuria, and all other sxs at this time. No prior tx. Pt is on Eliquis. No further complaints or concerns at this time.   In the ER, VS /65, , Sats 100% on RA, Afeb, WBC 3.7, H/H 9.5/33, Bun/Cr 82/4.3, K 6.7- treated aggressively in the ER and rechecked and repeat 5.4. He is heme positive as well. C Diff Neg. She is being admitted for possible Hyperkalemia, acute GI Bleed, ANGELITO.     Overview/Hospital Course:  69 y.o. male patient with a PMHx of CHF- EF 40%, anemia, hyperparathyroidism, type 2 DM, s/p kidney transplant 2015 on Prograf and Cellcept, DVT on  Eliquis, MARION, HLD, HTN, CKD, Hep C, and arthritis who presents to the Emergency Department for evaluation of nausea vomiting and diarrhea over the past several days (both of which have improved), but felt weak and couldn't move around like normal. no abd pain, no systemic symptoms, stool now formed. Pt is a very poor historian. According to nursing staff, family reported that the pt has been sitting in his chair for the last 3 days. Pt reports that he has had two episodes of diarrhea since yesterday, but due to the increasing weakness in his knees he was unable to get up from his chair. Pt normally ambulates with assistance from a walker. Symptoms are constant and moderate in severity. Associated sxs include blood in stool (x4 days) and n/v yesterday, but none today after PO. Patient denies any fever, abdominal pain, appetite changes, SOB, dysuria, and all other sxs at this time. No prior tx. Pt is on Eliquis. No further complaints or concerns at this time.   In the ER, VS /65, , Sats 100% on RA, Afeb, WBC 3.7, H/H 9.5/33, Bun/Cr 82/4.3, K 6.7- treated aggressively in the ER and rechecked and repeat 5.4. He is heme positive as well. C Diff Neg. She is being admitted for possible Hyperkalemia, acute GI Bleed, ANGELITO.     1/13- Appreciate Renal and GI input- pt looks and feels a little better, stronger, more alert and responsive. Wife and daughter are here. His prograf level very high- 32- hence Held. It seems that he was getting Prograf toxicity at home which then resulted in Hematuria, ABL Anemia, ANGELITO and Hyperkalemia. Pt also felt weak and low sugars, so drank a lot of OJ which further raised his K level. Does not appears to have true GI bleed. But has hematuria- hence Purewick catheter placed and Dr. Bennett also started him on IVF- hematuria appears to be clearing. Pt feeling better, will check labs in am and start PT/OT. K was 6.1 this am- started on lokelma.     1/14- looks a little better, no melena  but still has hematuria. H/h stable, 8.3/28, K still 6, Bun.Cr still high 83/4. Repeat Prograf level pending. VSS Afeb, he is more alert and talking. Wound care wrapped his legs with moderate compression. Had mod R SFA stenosis, vascular evaluated him and will continue to monitor him, no surgery or angioplasty needed at this time. Will start PT/OT in am. Cont IVF, If Hematuria persists, start CBI in am.     1/15- looks better but c/o pain in his legs which are in a compression socks. Good response to iVF, Hematuria getting better and Cr down to 3 now with good urine output. H/H dropped to 7.2/22 sec to IVF and Hematuria. Still await repeat prograf level. Getting PT/OT, started on Norco for pain control.      1/16- resting quietly, comfortable, making good amount of urine, hematuria almost cleared with IVF. Bun/Cr down to 58/2.9. K normal, lokelm d/koki. H/H improved to 7.7/26. Prograf noiw 5.2, will restart at 3 mg bid. Stop hydration in am, start PT/OT. D/c home soon.     01/17/2025  Patient requiring max assists with bed transitions and feeding. High intensity therapy recommended by therapists. Patient previously independent, living at home. Referrals for rehab placed at this time.      01/18/2025  Some notable objective clinical improvement. Reports decreased ROM in UE bilaterally but predominantly his right UE. Radiography of the left hand and R shoulder unremarkable. Will obtain CT cervical spine to further assess for stenosis.     01/19/2025  Cervical spine CT shows right moderate foraminal stenosis at C3-4 and C5-6 secondary to spurring of the uncovertebral joints.  Will obtain MRI of spine and consult orthospine to assess to see if findings could be contributing to his significant UE weakness and pain.     1/20/2025:   MRI Cervical/Thoracic/Lumbar showed multilevel spondylosis mild to moderate but no acute findings. Patient's daughter tells me patient independently prior to hospitalization. Patient complains  of upper and lower bilateral weakness. While Prograf toxicity could play a part in progressive decline, he will need additional work up. CT head unremarkable. Kidney function has returned to baseline.  Vitamin B12 pending. Neurosurgery/Neurology consult pending. He is awaiting rehab vs SNF placement.     1/21/2025  MRI brain shows atrophy and microvascular changes but no acute findings. His progressive weakness was evaluated by Neurology who recommended obtaining thiamine, copper, vitamin E, and vitamin D level. Continues to have hematuria on UA. Order high risk urine culture and empirically start Rocephin. Will consider Urology consult pending hospital course.    1/22/2025  Kidney function stable. Hypothermic overnight, improved with heating blanket. Thyroid studies and infectious work up unremarkable. Suspect hypothermia could be related to chilled room condition. Plans for SNF.     1/23/2025  Patient continues to have bilateral elbow and right knee tenderness and <ROM. Discussed X-ray of knee findings with Orthopedic surgery who agrees to perform knee aspiration.  Cell count unremarkable. Crystals, Gram stain, and cultures are still pending. Will give dose of colchicine 0.6mg x 1 and monitor response.  Ann catheter discontinued today with plans for voiding trial. Plans to discharge to SNF.    1/24/2025  Aspiration consistent with gout. Uric acid elevated at 11. Steroids increased to 20 mg BID for now for acute gout flare. Renal function continues to improve and electrolytes are stable. Awaiting SNF placement.     1/25/2025  Responding well to steroids for gout exacerbation. Moving all extremities better. Long acting insulin optimized for steroid induced hyperglycemia. Awaiting SNF.    1/26/2025  Doing well today. Patient sat up on side of bed with standby assistance this AM. He had an episode of dyspnea after waking yesterday evening. CXR shows low grade CHF, he received Lasix 20 mg IV x 1 dose with  improvement. Patient has a history of CHF previously on Lasix 40mg BID, but was held due to acute on chronic renal failure. Renal function continues to remain stable, so will start Lasix 40mg daily. Orders placed for nocturnal CPAP. Medications optimized for steroid induced hyperglycemia.     01/27/2025  Patient doing well.  Will increase Lantus to 20 units b.i.d..  Awaiting placement.    Interval History:  No acute issues reported overnight.    Review of Systems   Constitutional:  Positive for activity change and appetite change. Negative for fever.   HENT:  Negative for hearing loss.    Eyes:  Negative for visual disturbance.   Respiratory:  Negative for cough and shortness of breath.    Cardiovascular:  Positive for leg swelling (bilateral knee pain improving). Negative for chest pain and palpitations.   Genitourinary:  Negative for difficulty urinating, dysuria, frequency and hematuria.   Musculoskeletal:  Positive for arthralgias. Negative for back pain.   Skin:  Positive for wound. Negative for color change and pallor.   Neurological:  Positive for weakness. Negative for seizures, syncope, numbness and headaches.   Hematological:  Does not bruise/bleed easily.   Psychiatric/Behavioral:  The patient is not nervous/anxious.      Objective:     Vital Signs (Most Recent):  Temp: 98.5 °F (36.9 °C) (01/27/25 0807)  Pulse: 66 (01/27/25 0807)  Resp: 18 (01/27/25 0807)  BP: (!) 114/56 (01/27/25 0807)  SpO2: 100 % (01/27/25 0807) Vital Signs (24h Range):  Temp:  [97.2 °F (36.2 °C)-98.5 °F (36.9 °C)] 98.5 °F (36.9 °C)  Pulse:  [66-94] 66  Resp:  [16-20] 18  SpO2:  [96 %-100 %] 100 %  BP: ()/(54-66) 114/56     Weight: 130.2 kg (287 lb 0.6 oz)  Body mass index is 44.96 kg/m².  No intake or output data in the 24 hours ending 01/27/25 1129      Physical Exam  Constitutional:       General: He is not in acute distress.     Appearance: Normal appearance. He is well-developed. He is obese. He is not ill-appearing,  toxic-appearing or diaphoretic.   HENT:      Head: Normocephalic and atraumatic.   Eyes:      Extraocular Movements: Extraocular movements intact.   Cardiovascular:      Rate and Rhythm: Normal rate and regular rhythm.      Heart sounds: Normal heart sounds. No murmur heard.  Pulmonary:      Effort: Pulmonary effort is normal. No respiratory distress.      Breath sounds: Normal breath sounds. No wheezing.   Abdominal:      General: Bowel sounds are normal. There is no distension.      Palpations: Abdomen is soft. There is no mass.      Tenderness: There is no abdominal tenderness.   Musculoskeletal:         General: Swelling present.      Cervical back: Normal range of motion and neck supple.      Right lower leg: Edema (improving) present.      Left lower leg: Edema (improving) present.      Comments: Elbow and knee tenderness improving     Skin:     General: Skin is warm and dry.      Capillary Refill: Capillary refill takes 2 to 3 seconds.   Neurological:      General: No focal deficit present.      Mental Status: He is alert and oriented to person, place, and time.      Cranial Nerves: No cranial nerve deficit.      Motor: Weakness present.   Psychiatric:         Mood and Affect: Mood normal.         Behavior: Behavior normal.         Thought Content: Thought content normal.         Judgment: Judgment normal.             Significant Labs: All pertinent labs within the past 24 hours have been reviewed.    Significant Imaging: I have reviewed all pertinent imaging results/findings within the past 24 hours.    Assessment and Plan     * Severe physical deconditioning  01/17/2025  Patient requiring max assists with bed transitions and feeding. High intensity therapy recommended by therapists. Patient previously independent, living at home. Referrals for rehab placed at this time.      01/18/2025  Some notable objective clinical improvement. Reports decreased ROM in UE bilaterally but predominantly his right UE.  Radiography of the left hand and R shoulder unremarkable. Will obtain CT cervical spine to further assess for stenosis.     01/19/2025  Cervical spine CT shows right moderate foraminal stenosis at C3-4 and C5-6 secondary to spurring of the uncovertebral joints.  Will obtain MRI of spine and consult orthospine to assess to see if findings could be contributing to his significant UE weakness and pain.     1/20/2025  Patient's daughter tells me patient independently prior to hospitalization. Patient complains of upper and lower bilateral weakness. While Prograf toxicity could play a part in progressive decline, he will need additional work up. CT head unremarkable. Kidney function has returned to baseline.  Vitamin B12 pending. Neurosurgery/Neurology consult pending. He is awaiting rehab vs SNF placement.     1/21/2025  Neurology input appreciated  Follow Vitamin B12, Thiamine, Copper  Continue PT/OT  Awaiting SNF consultation    1/23/25  S/p knee aspiration today.   Cell count unremarkable  Awaiting gram stain, crystals, and culture  Continue PT/TO.   SNF  placement pending    1/24/25  Treat gout  Awaiting SNF    1/25/25  Doing better today  Moving upper and lower extremities  Continue PT/TO  Awaiting SNF placement    1/26/25  Gradually improving. He was able to sit up on side of bed with standby assistance. Continue PT/TO. Awaiting SNF placement.     1/27/2025  Awaiting SNF placement    Right knee pain  X-ray from 1/6/25 shows soft tissue swelling, degenerative changes, and small effusion.   Continues to have persistent pain and decreased ROM  S/p aspiration. Cell count does not suggest infection  Gram stain, culture, and crystal are pending  Previous uric acid level elevated  Give Colchicine 0.6 mg PO x 1 dose and monitor response.    1/24/25  Aspiration consistent with gout  Increase prednisone to 20mg BID    1/25/25  Improving with steroids (plan for 5 days then taper back to 5mg daily)  Allopurinol  "started    1/26/25  Related to gouty arthritis  Improving. Will need to continue management above    Gross hematuria  Stable, has Purewick catheter now  Getting IVF, if gets worse, may need CBI    Still persists but a little better  If no improvement tomorrow- will try CBI    Improving with good Urine output, CBI not needed  Cont close monitoring      Improving, cont IVF    1/20/25  Improved  IVFs discontinued    1/21/25  Microscopic urine >100  Add high risk urine culture  Empirically place on Rocephin  Consider Urology consultation    1/22/25  Urine culture pending    1/23/25  Urine culture negative; stop IV Rocephin  Ann catheter removed and patient is voiding well    Hyperkalemia  Hyperkalemia is likely due to Increased potassium intake.The patients most recent potassium results are listed below.  Recent Labs     01/24/25  0446 01/25/25  0445 01/26/25  0447   K 4.5 5.0 5.3*       01/26/2025  Slightly elevated today. Give Lokelma x 1 dose      CKD (chronic kidney disease) stage 4, GFR 15-29 ml/min  Creatine stable for now. BMP reviewed- noted Estimated Creatinine Clearance: 48.8 mL/min (A) (based on SCr of 1.8 mg/dL (H)). according to latest data. Based on current GFR, CKD stage is stage 4 - GFR 15-29.  Monitor UOP and serial BMP and adjust therapy as needed. Renally dose meds. Avoid nephrotoxic medications and procedures.  Pt is d/p Renal Transplant in 2015, on Prograf and Cellcept    Edwina on CKD 4- sec to Prograf toxicity- Bleeding- started on IVF  Cont IVF  Improving with IVF, Cr coming down to 3    01/26/2025  Stable, 1.8      Chronic combined systolic and diastolic congestive heart failure  Patient has Combined Systolic and Diastolic heart failure that is Chronic. On presentation their CHF was  euvolemic . Most recent BNP and echo results are listed below.  No results for input(s): "BNP" in the last 72 hours.  Latest ECHO  Results for orders placed during the hospital encounter of " 11/26/24    Echo    Interpretation Summary    Left Ventricle: The left ventricle is normal in size. Normal wall thickness. There is concentric hypertrophy. Normal wall motion. Septal motion is consistent with bundle branch block. There is moderately reduced systolic function. Ejection fraction is approximately 35%. Grade I diastolic dysfunction.    Right Ventricle: Normal right ventricular cavity size. Wall thickness is normal. Systolic function is normal.    IVC/SVC: Normal venous pressure at 3 mmHg.    Current Heart Failure Medications  metoprolol succinate (TOPROL-XL) 24 hr tablet 25 mg, Daily, Oral    Plan  - Monitor strict I&Os and daily weights.    - Place on telemetry  - Low sodium diet  - Place on fluid restriction of 1.5 L.   - Cardiology has not been consulted  - The patient's volume status is  stable    1/26/25  Episode of dyspnea yesterday. CXR shows early CHF. EF 35% with DD. He was given Lasix IV x 1 dose,. Patient was previously taking 40m BID, but this was held on admit given acute on chronic renal failure. His creatinine is now lower than usual baseline of 2.2.   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  Last A1c reviewed-   Lab Results   Component Value Date    HGBA1C 5.7 (H) 12/17/2024     Most recent fingerstick glucose reviewed-   Recent Labs   Lab 01/25/25  1737 01/25/25  2109 01/26/25  0624 01/26/25  0947   POCTGLUCOSE 369* 325* 293* 399*     Current correctional scale  Medium  Maintain anti-hyperglycemic dose as follows-   Antihyperglycemics (From admission, onward)      Start     Stop Route Frequency Ordered    01/26/25 1000  insulin glargine U-100 (Lantus) pen 15 Units         -- SubQ 2 times daily 01/26/25 0922    01/14/25 1503  insulin aspart U-100 pen 0-10 Units         -- SubQ Before meals &  nightly PRN 01/14/25 1404        1/26/25  Glucose usually controlled when he was on prednisone 5mg daily. Steroid increased due to gouty arthritis and has required optimization of sliding scale long acting insulin. I suspect his glucose will stabilize when steroids are tapered.    ABL Anemia plus anemia of CKD 4      Anemia is likely due to acute blood loss which was from CKD, s/p Renal transplant and chronic disease due to Chronic Kidney Disease. Most recent hemoglobin and hematocrit are listed below.  Recent Labs     01/23/25  0449 01/24/25  0446 01/25/25  0445   HGB 8.2* 8.7* 8.6*   HCT 28.2* 30.3* 30.3*       01/25/2025  Stable  Patient has had 1-2% bandemia. Overt infection ruled out. Patient is chronically on steroids. Will closely monitor trend.      VTE Risk Mitigation (From admission, onward)           Ordered     heparin (porcine) injection 5,000 Units  Every 8 hours         01/25/25 1828     Reason for No Pharmacological VTE Prophylaxis  Once        Question:  Reasons:  Answer:  Active Bleeding    01/12/25 1729     IP VTE HIGH RISK PATIENT  Once         01/12/25 1729     Place sequential compression device  Until discontinued         01/12/25 1729                    Discharge Planning   GRETEL:      Code Status: Full Code   Medical Readiness for Discharge Date:   Discharge Plan A: Skilled Nursing Facility   Discharge Delays: None known at this time            Long Payan MD  Department of Hospital Medicine   '[facility] - Telemetry ([facility])

## 2025-01-27 NOTE — NURSING
Cdiff labs still in process from 24h, lab called and stated that the stool specimen was never sent down. Patient has not had bowel movement within 24h, orders DC

## 2025-01-27 NOTE — PLAN OF CARE
SW notified by Lelia Vazquez, that SNF auth still pending. SW to follow for updates.   142 received and added to Epic.

## 2025-01-27 NOTE — PLAN OF CARE
Pt oriented x4. Vital signs stable  Pt remained afebrile throughout this shift.   All meds administered per order.   Pt remained free of falls this shift.   Plan of care reviewed. Patient verbalizes understanding.   Pt turn Q2 on wedge.  Pt Blood glucose monitored, coverage required.  Bed in lowest position, upper side side rails up x 2, wheels locked  Call light in reach, bed alarm on.   Patient instructed to call staff for mobility/assistance.  Nonskid socks on when out of bed.  Patient education provided.  No further concerns voiced at this time.     Problem: Adult Inpatient Plan of Care  Goal: Plan of Care Review  Outcome: Progressing  Goal: Patient-Specific Goal (Individualized)  Outcome: Progressing  Goal: Absence of Hospital-Acquired Illness or Injury  Outcome: Progressing  Goal: Optimal Comfort and Wellbeing  Outcome: Progressing  Goal: Readiness for Transition of Care  Outcome: Progressing     Problem: Bariatric Environmental Safety  Goal: Safety Maintained with Care  Outcome: Progressing     Problem: Diabetes Comorbidity  Goal: Blood Glucose Level Within Targeted Range  Outcome: Progressing     Problem: Wound  Goal: Optimal Coping  Outcome: Progressing  Goal: Optimal Functional Ability  Outcome: Progressing  Goal: Absence of Infection Signs and Symptoms  Outcome: Progressing  Goal: Improved Oral Intake  Outcome: Progressing  Goal: Optimal Pain Control and Function  Outcome: Progressing  Goal: Skin Health and Integrity  Outcome: Progressing  Goal: Optimal Wound Healing  Outcome: Progressing     Problem: Skin Injury Risk Increased  Goal: Skin Health and Integrity  Outcome: Progressing     Problem: Pain Acute  Goal: Optimal Pain Control and Function  Outcome: Progressing     Problem: Infection  Goal: Absence of Infection Signs and Symptoms  Outcome: Progressing     Problem: Fall Injury Risk  Goal: Absence of Fall and Fall-Related Injury  Outcome: Progressing

## 2025-01-27 NOTE — SUBJECTIVE & OBJECTIVE
Interval History:  No acute issues reported overnight.    Review of Systems   Constitutional:  Positive for activity change and appetite change. Negative for fever.   HENT:  Negative for hearing loss.    Eyes:  Negative for visual disturbance.   Respiratory:  Negative for cough and shortness of breath.    Cardiovascular:  Positive for leg swelling (bilateral knee pain improving). Negative for chest pain and palpitations.   Genitourinary:  Negative for difficulty urinating, dysuria, frequency and hematuria.   Musculoskeletal:  Positive for arthralgias. Negative for back pain.   Skin:  Positive for wound. Negative for color change and pallor.   Neurological:  Positive for weakness. Negative for seizures, syncope, numbness and headaches.   Hematological:  Does not bruise/bleed easily.   Psychiatric/Behavioral:  The patient is not nervous/anxious.      Objective:     Vital Signs (Most Recent):  Temp: 98.5 °F (36.9 °C) (01/27/25 0807)  Pulse: 66 (01/27/25 0807)  Resp: 18 (01/27/25 0807)  BP: (!) 114/56 (01/27/25 0807)  SpO2: 100 % (01/27/25 0807) Vital Signs (24h Range):  Temp:  [97.2 °F (36.2 °C)-98.5 °F (36.9 °C)] 98.5 °F (36.9 °C)  Pulse:  [66-94] 66  Resp:  [16-20] 18  SpO2:  [96 %-100 %] 100 %  BP: ()/(54-66) 114/56     Weight: 130.2 kg (287 lb 0.6 oz)  Body mass index is 44.96 kg/m².  No intake or output data in the 24 hours ending 01/27/25 1129      Physical Exam  Constitutional:       General: He is not in acute distress.     Appearance: Normal appearance. He is well-developed. He is obese. He is not ill-appearing, toxic-appearing or diaphoretic.   HENT:      Head: Normocephalic and atraumatic.   Eyes:      Extraocular Movements: Extraocular movements intact.   Cardiovascular:      Rate and Rhythm: Normal rate and regular rhythm.      Heart sounds: Normal heart sounds. No murmur heard.  Pulmonary:      Effort: Pulmonary effort is normal. No respiratory distress.      Breath sounds: Normal breath sounds. No  wheezing.   Abdominal:      General: Bowel sounds are normal. There is no distension.      Palpations: Abdomen is soft. There is no mass.      Tenderness: There is no abdominal tenderness.   Musculoskeletal:         General: Swelling present.      Cervical back: Normal range of motion and neck supple.      Right lower leg: Edema (improving) present.      Left lower leg: Edema (improving) present.      Comments: Elbow and knee tenderness improving     Skin:     General: Skin is warm and dry.      Capillary Refill: Capillary refill takes 2 to 3 seconds.   Neurological:      General: No focal deficit present.      Mental Status: He is alert and oriented to person, place, and time.      Cranial Nerves: No cranial nerve deficit.      Motor: Weakness present.   Psychiatric:         Mood and Affect: Mood normal.         Behavior: Behavior normal.         Thought Content: Thought content normal.         Judgment: Judgment normal.             Significant Labs: All pertinent labs within the past 24 hours have been reviewed.    Significant Imaging: I have reviewed all pertinent imaging results/findings within the past 24 hours.

## 2025-01-27 NOTE — PT/OT/SLP PROGRESS
Occupational Therapy   Treatment    Name: Mitch Whittaker  MRN: [id number]  Admitting Diagnosis:  Physical deconditioning       Recommendations:     Discharge Recommendations: Moderate Intensity Therapy  Discharge Equipment Recommendations:  to be determined by next level of care  Barriers to discharge:  Decreased caregiver support    Assessment:     Mitch Whittaker is a 69 y.o. male with a medical diagnosis of Physical deconditioning.  He presents with the following performance deficits affecting function are weakness, impaired endurance, impaired self care skills, impaired functional mobility, gait instability, impaired balance, decreased coordination, decreased upper extremity function, decreased lower extremity function, decreased safety awareness, pain, decreased ROM, edema, impaired cardiopulmonary response to activity.     Rehab Prognosis:  Fair; patient would benefit from acute skilled OT services to address these deficits and reach maximum level of function.       Plan:     Patient to be seen 2 x/week to address the above listed problems via self-care/home management, therapeutic activities, therapeutic exercises  Plan of Care Expires: 01/31/25  Plan of Care Reviewed with: patient, daughter    Subjective     Chief Complaint: PAIN, WEAKNESS  Patient/Family Comments/goals: improve strength, mobility, and independence  Pain/Comfort:  Pain Rating 1: 8/10  Location - Side 1: Right  Location - Orientation 1: generalized  Location 1: wrist  Pain Addressed 1: Reposition, Distraction  Pain Rating Post-Intervention 1: 8/10  Pain Rating 2: 8/10  Location - Side 2: Bilateral  Location - Orientation 2: generalized  Location 2: leg (with movement)  Pain Addressed 2: Reposition, Distraction  Pain Rating Post-Intervention 2: 8/10    Objective:     Communicated with: Nurse Whipple and epic chart review prior to session.  Patient found HOB elevated with peripheral IV, telemetry, PureWick, oxygen, pressure relief boots upon OT  entry to room.    General Precautions: Standard, fall    Orthopedic Precautions:N/A  Braces: N/A  Respiratory Status: Nasal cannula, flow 2 L/min     Occupational Performance:     Bed Mobility:    Patient completed Rolling/Turning to Left with  maximal assistance and 2 persons  Patient completed Supine to Sit with maximal assistance and 2 persons  Patient completed Sit to Supine with moderate assistance and 2 persons   Forward scoot to EOB with Max A.   Supine scoot to HOB with Total A x2.  Required increased time for all bed mobility.    [facility] 6 Click ADL: 10    Treatment & Education:  Pt sitting EOB ~15 minutes, requiring close SBA > Min A(with fatigue) to maintain sitting balance.   Pt performed x12 reps BUE AROM therex EOB:  Shoulder flexion  Elbow flexion/ext  Digit flexion/ext  Reviewed role of OT in acute setting and benefits of participation. Educated on techniques to use to increase independence and decrease fall risk with functional transfers. Educated on importance of EOB activity and calling for A to transfer/meet needs. Encouraged completion of B UE AROM therex throughout the day to tolerance to increase functional strength and activity tolerance. Educated patient on importance of increased tolerance to upright position and direct impact on CV endurance and strength. Patient encouraged to sit up with bed in chair position for a minimum of 2 consecutive hours per day. Educated pt on importance of turning/repositioning in bed for pressure relief to prevent skin breakdown/injury.  Patient stated understanding and in agreement with POC.     Patient left left sidelying with HOB elevated with all lines intact, call button in reach, and daughter present    GOALS:   Multidisciplinary Problems       Occupational Therapy Goals          Problem: Occupational Therapy    Goal Priority Disciplines Outcome Interventions   Occupational Therapy Goal     OT, PT/OT Progressing    Description: Goals to be met by: 1/31/25      Patient will increase functional independence with ADLs by performing:    UE Dressing with Minimal Assistance.  Sitting at edge of bed x15 minutes with Stand-by Assistance.  Rolling to Bilateral with Minimal Assistance.   Supine to sit with Minimal Assistance.                         DME Justifications:  TBD    Time Tracking:     OT Date of Treatment: 01/27/25  OT Start Time: 1440  OT Stop Time: 1505  OT Total Time (min): 25 min    Billable Minutes:Therapeutic Activity 15  Therapeutic Exercise 10    OT/ANNIA: OT     Number of ANNIA visits since last OT visit: 0  Tena Miranda OT     1/27/2025

## 2025-01-28 LAB
ALBUMIN SERPL BCP-MCNC: 1.8 G/DL (ref 3.5–5.2)
ANION GAP SERPL CALC-SCNC: 11 MMOL/L (ref 8–16)
BUN SERPL-MCNC: 57 MG/DL (ref 8–23)
CALCIUM SERPL-MCNC: 8.9 MG/DL (ref 8.7–10.5)
CHLORIDE SERPL-SCNC: 105 MMOL/L (ref 95–110)
CO2 SERPL-SCNC: 24 MMOL/L (ref 23–29)
COPPER SERPL-MCNC: 1446 UG/L (ref 665–1480)
CREAT SERPL-MCNC: 1.8 MG/DL (ref 0.5–1.4)
EST. GFR  (NO RACE VARIABLE): 40 ML/MIN/1.73 M^2
GLUCOSE SERPL-MCNC: 177 MG/DL (ref 70–110)
MAGNESIUM SERPL-MCNC: 1.9 MG/DL (ref 1.6–2.6)
PHOSPHATE SERPL-MCNC: 3.8 MG/DL (ref 2.7–4.5)
POCT GLUCOSE: 186 MG/DL (ref 70–110)
POCT GLUCOSE: 191 MG/DL (ref 70–110)
POCT GLUCOSE: 239 MG/DL (ref 70–110)
POCT GLUCOSE: 294 MG/DL (ref 70–110)
POTASSIUM SERPL-SCNC: 4.6 MMOL/L (ref 3.5–5.1)
POTASSIUM SERPL-SCNC: 4.8 MMOL/L (ref 3.5–5.1)
SODIUM SERPL-SCNC: 140 MMOL/L (ref 136–145)

## 2025-01-28 PROCEDURE — 25000003 PHARM REV CODE 250: Performed by: INTERNAL MEDICINE

## 2025-01-28 PROCEDURE — 25000003 PHARM REV CODE 250: Performed by: EMERGENCY MEDICINE

## 2025-01-28 PROCEDURE — 25000003 PHARM REV CODE 250: Performed by: FAMILY MEDICINE

## 2025-01-28 PROCEDURE — 83735 ASSAY OF MAGNESIUM: CPT | Performed by: FAMILY MEDICINE

## 2025-01-28 PROCEDURE — 36415 COLL VENOUS BLD VENIPUNCTURE: CPT | Performed by: FAMILY MEDICINE

## 2025-01-28 PROCEDURE — 80069 RENAL FUNCTION PANEL: CPT

## 2025-01-28 PROCEDURE — 21400001 HC TELEMETRY ROOM

## 2025-01-28 PROCEDURE — A4216 STERILE WATER/SALINE, 10 ML: HCPCS | Performed by: EMERGENCY MEDICINE

## 2025-01-28 PROCEDURE — 84132 ASSAY OF SERUM POTASSIUM: CPT | Performed by: FAMILY MEDICINE

## 2025-01-28 PROCEDURE — 63600175 PHARM REV CODE 636 W HCPCS: Performed by: EMERGENCY MEDICINE

## 2025-01-28 PROCEDURE — 25000003 PHARM REV CODE 250: Performed by: NURSE PRACTITIONER

## 2025-01-28 PROCEDURE — 25000003 PHARM REV CODE 250: Performed by: PHYSICIAN ASSISTANT

## 2025-01-28 PROCEDURE — 25000003 PHARM REV CODE 250: Performed by: STUDENT IN AN ORGANIZED HEALTH CARE EDUCATION/TRAINING PROGRAM

## 2025-01-28 PROCEDURE — 63600175 PHARM REV CODE 636 W HCPCS: Performed by: NURSE PRACTITIONER

## 2025-01-28 PROCEDURE — 97535 SELF CARE MNGMENT TRAINING: CPT

## 2025-01-28 PROCEDURE — 99900035 HC TECH TIME PER 15 MIN (STAT)

## 2025-01-28 PROCEDURE — 97530 THERAPEUTIC ACTIVITIES: CPT | Mod: CQ

## 2025-01-28 PROCEDURE — 99232 SBSQ HOSP IP/OBS MODERATE 35: CPT | Mod: ,,, | Performed by: INTERNAL MEDICINE

## 2025-01-28 PROCEDURE — 94761 N-INVAS EAR/PLS OXIMETRY MLT: CPT

## 2025-01-28 PROCEDURE — 97530 THERAPEUTIC ACTIVITIES: CPT

## 2025-01-28 PROCEDURE — 36415 COLL VENOUS BLD VENIPUNCTURE: CPT

## 2025-01-28 PROCEDURE — 94660 CPAP INITIATION&MGMT: CPT

## 2025-01-28 RX ORDER — FUROSEMIDE 40 MG/1
40 TABLET ORAL DAILY
Status: DISCONTINUED | OUTPATIENT
Start: 2025-01-29 | End: 2025-01-29 | Stop reason: HOSPADM

## 2025-01-28 RX ADMIN — TAMSULOSIN HYDROCHLORIDE 0.4 MG: 0.4 CAPSULE ORAL at 10:01

## 2025-01-28 RX ADMIN — HEPARIN SODIUM 5000 UNITS: 5000 INJECTION INTRAVENOUS; SUBCUTANEOUS at 06:01

## 2025-01-28 RX ADMIN — NYSTATIN 500000 UNITS: 100000 SUSPENSION ORAL at 05:01

## 2025-01-28 RX ADMIN — GABAPENTIN 300 MG: 300 CAPSULE ORAL at 02:01

## 2025-01-28 RX ADMIN — SODIUM BICARBONATE 650 MG TABLET 650 MG: at 08:01

## 2025-01-28 RX ADMIN — PREDNISONE 20 MG: 20 TABLET ORAL at 10:01

## 2025-01-28 RX ADMIN — Medication 10 ML: at 10:01

## 2025-01-28 RX ADMIN — SODIUM BICARBONATE 650 MG TABLET 650 MG: at 02:01

## 2025-01-28 RX ADMIN — INSULIN ASPART 2 UNITS: 100 INJECTION, SOLUTION INTRAVENOUS; SUBCUTANEOUS at 05:01

## 2025-01-28 RX ADMIN — TACROLIMUS 3 MG: 1 CAPSULE ORAL at 05:01

## 2025-01-28 RX ADMIN — GABAPENTIN 300 MG: 300 CAPSULE ORAL at 08:01

## 2025-01-28 RX ADMIN — TACROLIMUS 3 MG: 1 CAPSULE ORAL at 08:01

## 2025-01-28 RX ADMIN — INSULIN ASPART 2 UNITS: 100 INJECTION, SOLUTION INTRAVENOUS; SUBCUTANEOUS at 10:01

## 2025-01-28 RX ADMIN — LIDOCAINE 5% 2 PATCH: 700 PATCH TOPICAL at 09:01

## 2025-01-28 RX ADMIN — NYSTATIN 500000 UNITS: 100000 SUSPENSION ORAL at 10:01

## 2025-01-28 RX ADMIN — GABAPENTIN 300 MG: 300 CAPSULE ORAL at 10:01

## 2025-01-28 RX ADMIN — HEPARIN SODIUM 5000 UNITS: 5000 INJECTION INTRAVENOUS; SUBCUTANEOUS at 10:01

## 2025-01-28 RX ADMIN — Medication 10 ML: at 02:01

## 2025-01-28 RX ADMIN — INSULIN ASPART 2 UNITS: 100 INJECTION, SOLUTION INTRAVENOUS; SUBCUTANEOUS at 11:01

## 2025-01-28 RX ADMIN — METOPROLOL SUCCINATE 12.5 MG: 25 TABLET, EXTENDED RELEASE ORAL at 09:01

## 2025-01-28 RX ADMIN — INSULIN ASPART 6 UNITS: 100 INJECTION, SOLUTION INTRAVENOUS; SUBCUTANEOUS at 06:01

## 2025-01-28 RX ADMIN — CHOLESTYRAMINE 4 G: 4 POWDER, FOR SUSPENSION ORAL at 10:01

## 2025-01-28 RX ADMIN — HEPARIN SODIUM 5000 UNITS: 5000 INJECTION INTRAVENOUS; SUBCUTANEOUS at 02:01

## 2025-01-28 RX ADMIN — NYSTATIN 500000 UNITS: 100000 SUSPENSION ORAL at 08:01

## 2025-01-28 RX ADMIN — NYSTATIN 500000 UNITS: 100000 SUSPENSION ORAL at 01:01

## 2025-01-28 RX ADMIN — MYCOPHENOLATE MOFETIL 500 MG: 500 TABLET, FILM COATED ORAL at 08:01

## 2025-01-28 RX ADMIN — CHOLESTYRAMINE 4 G: 4 POWDER, FOR SUSPENSION ORAL at 08:01

## 2025-01-28 RX ADMIN — CIPROFLOXACIN HYDROCHLORIDE 1 DROP: 3 SOLUTION/ DROPS OPHTHALMIC at 10:01

## 2025-01-28 RX ADMIN — Medication 10 ML: at 06:01

## 2025-01-28 RX ADMIN — SODIUM BICARBONATE 650 MG TABLET 650 MG: at 10:01

## 2025-01-28 RX ADMIN — FUROSEMIDE 20 MG: 20 TABLET ORAL at 08:01

## 2025-01-28 RX ADMIN — CIPROFLOXACIN HYDROCHLORIDE 1 DROP: 3 SOLUTION/ DROPS OPHTHALMIC at 08:01

## 2025-01-28 RX ADMIN — INSULIN GLARGINE 20 UNITS: 100 INJECTION, SOLUTION SUBCUTANEOUS at 10:01

## 2025-01-28 RX ADMIN — MYCOPHENOLATE MOFETIL 500 MG: 500 TABLET, FILM COATED ORAL at 10:01

## 2025-01-28 RX ADMIN — ALLOPURINOL 100 MG: 100 TABLET ORAL at 08:01

## 2025-01-28 RX ADMIN — INSULIN GLARGINE 20 UNITS: 100 INJECTION, SOLUTION SUBCUTANEOUS at 08:01

## 2025-01-28 RX ADMIN — PANTOPRAZOLE SODIUM 40 MG: 40 TABLET, DELAYED RELEASE ORAL at 10:01

## 2025-01-28 RX ADMIN — PREDNISONE 20 MG: 20 TABLET ORAL at 08:01

## 2025-01-28 RX ADMIN — PANTOPRAZOLE SODIUM 40 MG: 40 TABLET, DELAYED RELEASE ORAL at 08:01

## 2025-01-28 NOTE — PT/OT/SLP PROGRESS
Physical Therapy  Treatment    Mitch Whittaker   MRN: [id number]   Admitting Diagnosis: Physical deconditioning    PT Received On: 01/28/25  PT Start Time: 0820     PT Stop Time: 0850    PT Total Time (min): 30 min       Billable Minutes:  Therapeutic Activity 30    Treatment Type: Treatment  PT/PTA: PTA     Number of PTA visits since last PT visit: 1       General Precautions: Standard, fall  Orthopedic Precautions: N/A  Braces:  (R WRIST SPLINT)  Respiratory Status: Nasal cannula, flow 2 L/min    Spiritual, Cultural Beliefs, Holiness Practices, Values that Affect Care: no    Subjective:  Communicated with patient's nurse and completed Epic chart review prior to session.  Patient agreed to PT session. Appears eager to participate.     Pain/Comfort  Pain Rating 1: 6/10  Location - Side 1: Bilateral  Location 1: leg  Pain Addressed 1: Other (see comments) (ACTIVITY PACING)  Pain Rating Post-Intervention 1: 6/10    Objective:   Patient found with: telemetry, peripheral IV, SCD, pressure relief boots, oxygen    Increased time required for all mobility but excellent effort throughout.     Supine > sit EOB: Max A of 2     Forward scoot towards EOB: Max A of 2 (multiple reps)    Seated EOB x 10 min total focusing on increased tolerance to upright position, core stability, trunk control and CV endurance.   Maintained self supported sitting balance with Max A of 2 progressing to CGA  Unable to maintain unsupported sitting balance a tthis time.   Receptive to cues to increase anterior weight shift    Sit > supine: Max A of 2     Supine scoot towards HOB: Max A of 2     Reviewed AROM TE to BLE including: hip flex/ext, knee flex/ext, ankle PF/DF  To be completed a minimum of 10 reps for each LE in order to promote return of function, strength and ROM.     Educated patient on importance of increased tolerance to upright position and direct impact on CV endurance and strength. Patient encouraged to utilize elevated HOB to simulate  chair position until able to safely complete chair T/F. Patient given a minimum goal of majority of the day to be spent in upright, especially with all meals. Encouraged patient to perform AROM TE to BLE throughout the day within all available planes of motion. Re enforced importance of utilizing call light to meet needs in room and not attempt to get up without staff assistance. Patient verbalized understanding and agreed to comply.    AM-PAC 6 CLICK MOBILITY  How much help from another person does this patient currently need?   1 = Unable, Total/Dependent Assistance  2 = A lot, Maximum/Moderate Assistance  3 = A little, Minimum/Contact Guard/Supervision  4 = None, Modified [place]/Independent    Turning over in bed (including adjusting bedclothes, sheets and blankets)?: 2  Sitting down on and standing up from a chair with arms (e.g., wheelchair, bedside commode, etc.): 1  Moving from lying on back to sitting on the side of the bed?: 2  Moving to and from a bed to a chair (including a wheelchair)?: 1  Need to walk in hospital room?: 1  Climbing 3-5 steps with a railing?: 1  Basic Mobility Total Score: 8    AM-PAC Raw Score CMS G-Code Modifier Level of Impairment Assistance   6 % Total / Unable   7 - 9 CM 80 - 100% Maximal Assist   10 - 14 CL 60 - 80% Moderate Assist   15 - 19 CK 40 - 60% Moderate Assist   20 - 22 CJ 20 - 40% Minimal Assist   23 CI 1-20% SBA / CGA   24 CH 0% Independent/ Mod I     Patient left HOB elevated with call button in reach.    Assessment:  Mitch Whittaker is a 69 y.o. male with a medical diagnosis of Physical deconditioning and presents with overall decline in functional mobility. Patient would continue to benefit from skilled PT to address functional limitations listed below in order to return to PLOF/decrease caregiver burden.     Rehab identified problem list/impairments: weakness, impaired endurance, impaired self care skills, gait instability, impaired functional mobility,  impaired balance, pain, decreased safety awareness, decreased lower extremity function, decreased upper extremity function, decreased coordination, decreased ROM, impaired cardiopulmonary response to activity    Rehab potential is fair.    Activity tolerance: Fair    Discharge recommendations: Moderate Intensity Therapy      Barriers to discharge:      Equipment recommendations: to be determined by next level of care     GOALS:   Multidisciplinary Problems       Physical Therapy Goals          Problem: Physical Therapy    Goal Priority Disciplines Outcome Interventions   Physical Therapy Goal     PT, PT/OT Progressing    Description: All LTGs to be met by 2/1/25    Bed mobility Sba   Transfers SBA   Gait of at least 100 feet SBA   Pt will increase AMPAC score by 2 points to progress functional mobility  Pt will tolerate > 10 min of functional activity to progress gross functional mobility                        DME Justifications:  No DME recommended requiring DME justifications    PLAN:    Patient to be seen 3 x/week to address the above listed problems via gait training, therapeutic activities, therapeutic exercises  Plan of Care expires: 02/01/25  Plan of Care reviewed with: patient         01/28/2025

## 2025-01-28 NOTE — PROGRESS NOTES
[facility]  Progress Note    Patient Name: Mitch Whittaker  MRN: [id number]  Patient Class: IP- Inpatient   Admission Date: 1/12/2025  Length of Stay: 16 days  Attending Physician: Long Payan MD  Primary Care Provider: Valery Caal MD        Subjective     Principal Problem:Physical deconditioning        HPI:  Mitch Whittaker is a 69 y.o. male patient with a PMHx of CHF- EF 40%, anemia, hyperparathyroidism, type 2 DM, s/p kidney transplant 2015 on Prograf and Cellcept, DVT on Eliquis, MARION, HLD, HTN, CKD, Hep C, and arthritis who presents to the Emergency Department for evaluation of nausea vomiting and diarrhea over the past several days (both of which have improved), but felt weak and couldn't move around like normal. no abd pain, no systemic symptoms, stool now formed. Pt is a very poor historian. According to nursing staff, family reported that the pt has been sitting in his chair for the last 3 days. Pt reports that he has had two episodes of diarrhea since yesterday, but due to the increasing weakness in his knees he was unable to get up from his chair. Pt normally ambulates with assistance from a walker. Symptoms are constant and moderate in severity. Associated sxs include blood in stool (x4 days) and n/v yesterday, but none today after PO. Patient denies any fever, abdominal pain, appetite changes, SOB, dysuria, and all other sxs at this time. No prior tx. Pt is on Eliquis. No further complaints or concerns at this time.   In the ER, VS /65, , Sats 100% on RA, Afeb, WBC 3.7, H/H 9.5/33, Bun/Cr 82/4.3, K 6.7- treated aggressively in the ER and rechecked and repeat 5.4. He is heme positive as well. C Diff Neg. She is being admitted for possible Hyperkalemia, acute GI Bleed, NAGELITO.     Overview/Hospital Course:  69 y.o. male patient with a PMHx of CHF- EF 40%, anemia, hyperparathyroidism, type 2 DM, s/p kidney transplant 2015 on Prograf and Cellcept, DVT on  Eliquis, MARION, HLD, HTN, CKD, Hep C, and arthritis who presents to the Emergency Department for evaluation of nausea vomiting and diarrhea over the past several days (both of which have improved), but felt weak and couldn't move around like normal. no abd pain, no systemic symptoms, stool now formed. Pt is a very poor historian. According to nursing staff, family reported that the pt has been sitting in his chair for the last 3 days. Pt reports that he has had two episodes of diarrhea since yesterday, but due to the increasing weakness in his knees he was unable to get up from his chair. Pt normally ambulates with assistance from a walker. Symptoms are constant and moderate in severity. Associated sxs include blood in stool (x4 days) and n/v yesterday, but none today after PO. Patient denies any fever, abdominal pain, appetite changes, SOB, dysuria, and all other sxs at this time. No prior tx. Pt is on Eliquis. No further complaints or concerns at this time.   In the ER, VS /65, , Sats 100% on RA, Afeb, WBC 3.7, H/H 9.5/33, Bun/Cr 82/4.3, K 6.7- treated aggressively in the ER and rechecked and repeat 5.4. He is heme positive as well. C Diff Neg. She is being admitted for possible Hyperkalemia, acute GI Bleed, ANGELITO.     1/13- Appreciate Renal and GI input- pt looks and feels a little better, stronger, more alert and responsive. Wife and daughter are here. His prograf level very high- 32- hence Held. It seems that he was getting Prograf toxicity at home which then resulted in Hematuria, ABL Anemia, ANGELITO and Hyperkalemia. Pt also felt weak and low sugars, so drank a lot of OJ which further raised his K level. Does not appears to have true GI bleed. But has hematuria- hence Purewick catheter placed and Dr. Bennett also started him on IVF- hematuria appears to be clearing. Pt feeling better, will check labs in am and start PT/OT. K was 6.1 this am- started on lokelma.     1/14- looks a little better, no melena  but still has hematuria. H/h stable, 8.3/28, K still 6, Bun.Cr still high 83/4. Repeat Prograf level pending. VSS Afeb, he is more alert and talking. Wound care wrapped his legs with moderate compression. Had mod R SFA stenosis, vascular evaluated him and will continue to monitor him, no surgery or angioplasty needed at this time. Will start PT/OT in am. Cont IVF, If Hematuria persists, start CBI in am.     1/15- looks better but c/o pain in his legs which are in a compression socks. Good response to iVF, Hematuria getting better and Cr down to 3 now with good urine output. H/H dropped to 7.2/22 sec to IVF and Hematuria. Still await repeat prograf level. Getting PT/OT, started on Norco for pain control.      1/16- resting quietly, comfortable, making good amount of urine, hematuria almost cleared with IVF. Bun/Cr down to 58/2.9. K normal, lokelm d/koki. H/H improved to 7.7/26. Prograf noiw 5.2, will restart at 3 mg bid. Stop hydration in am, start PT/OT. D/c home soon.     01/17/2025  Patient requiring max assists with bed transitions and feeding. High intensity therapy recommended by therapists. Patient previously independent, living at home. Referrals for rehab placed at this time.      01/18/2025  Some notable objective clinical improvement. Reports decreased ROM in UE bilaterally but predominantly his right UE. Radiography of the left hand and R shoulder unremarkable. Will obtain CT cervical spine to further assess for stenosis.     01/19/2025  Cervical spine CT shows right moderate foraminal stenosis at C3-4 and C5-6 secondary to spurring of the uncovertebral joints.  Will obtain MRI of spine and consult orthospine to assess to see if findings could be contributing to his significant UE weakness and pain.     1/20/2025:   MRI Cervical/Thoracic/Lumbar showed multilevel spondylosis mild to moderate but no acute findings. Patient's daughter tells me patient independently prior to hospitalization. Patient complains  of upper and lower bilateral weakness. While Prograf toxicity could play a part in progressive decline, he will need additional work up. CT head unremarkable. Kidney function has returned to baseline.  Vitamin B12 pending. Neurosurgery/Neurology consult pending. He is awaiting rehab vs SNF placement.     1/21/2025  MRI brain shows atrophy and microvascular changes but no acute findings. His progressive weakness was evaluated by Neurology who recommended obtaining thiamine, copper, vitamin E, and vitamin D level. Continues to have hematuria on UA. Order high risk urine culture and empirically start Rocephin. Will consider Urology consult pending hospital course.    1/22/2025  Kidney function stable. Hypothermic overnight, improved with heating blanket. Thyroid studies and infectious work up unremarkable. Suspect hypothermia could be related to chilled room condition. Plans for SNF.     1/23/2025  Patient continues to have bilateral elbow and right knee tenderness and <ROM. Discussed X-ray of knee findings with Orthopedic surgery who agrees to perform knee aspiration.  Cell count unremarkable. Crystals, Gram stain, and cultures are still pending. Will give dose of colchicine 0.6mg x 1 and monitor response.  Ann catheter discontinued today with plans for voiding trial. Plans to discharge to SNF.    1/24/2025  Aspiration consistent with gout. Uric acid elevated at 11. Steroids increased to 20 mg BID for now for acute gout flare. Renal function continues to improve and electrolytes are stable. Awaiting SNF placement.     1/25/2025  Responding well to steroids for gout exacerbation. Moving all extremities better. Long acting insulin optimized for steroid induced hyperglycemia. Awaiting SNF.    1/26/2025  Doing well today. Patient sat up on side of bed with standby assistance this AM. He had an episode of dyspnea after waking yesterday evening. CXR shows low grade CHF, he received Lasix 20 mg IV x 1 dose with  improvement. Patient has a history of CHF previously on Lasix 40mg BID, but was held due to acute on chronic renal failure. Renal function continues to remain stable, so will start Lasix 40mg daily. Orders placed for nocturnal CPAP. Medications optimized for steroid induced hyperglycemia.     01/27/2025  Patient doing well.  Will increase Lantus to 20 units b.i.d..  Awaiting placement.  01/28/2025  Glucose better controlled.  Continue Lantus.  Awaiting placement.    Interval History:  No acute issues reported overnight.    Review of Systems   Constitutional:  Positive for activity change and appetite change. Negative for fever.   HENT:  Negative for hearing loss.    Eyes:  Negative for visual disturbance.   Respiratory:  Negative for cough and shortness of breath.    Cardiovascular:  Positive for leg swelling (bilateral knee pain improving). Negative for chest pain and palpitations.   Genitourinary:  Negative for difficulty urinating, dysuria, frequency and hematuria.   Musculoskeletal:  Positive for arthralgias. Negative for back pain.   Skin:  Positive for wound. Negative for color change and pallor.   Neurological:  Positive for weakness. Negative for seizures, syncope, numbness and headaches.   Hematological:  Does not bruise/bleed easily.   Psychiatric/Behavioral:  The patient is not nervous/anxious.      Objective:     Vital Signs (Most Recent):  Temp: (!) 95.2 °F (35.1 °C) (MD AWARE) (01/28/25 1444)  Pulse: 74 (01/28/25 1553)  Resp: 18 (01/28/25 1553)  BP: (!) 99/51 (01/28/25 1553)  SpO2: 98 % (01/28/25 1553) Vital Signs (24h Range):  Temp:  [94 °F (34.4 °C)-98.3 °F (36.8 °C)] 95.2 °F (35.1 °C)  Pulse:  [49-74] 74  Resp:  [18-20] 18  SpO2:  [86 %-100 %] 98 %  BP: ()/(50-84) 99/51     Weight: 130.5 kg (287 lb 11.2 oz)  Body mass index is 45.06 kg/m².  No intake or output data in the 24 hours ending 01/28/25 1611      Physical Exam  Constitutional:       General: He is not in acute distress.      Appearance: Normal appearance. He is well-developed. He is obese. He is not ill-appearing, toxic-appearing or diaphoretic.   HENT:      Head: Normocephalic and atraumatic.   Eyes:      Extraocular Movements: Extraocular movements intact.   Cardiovascular:      Rate and Rhythm: Normal rate and regular rhythm.      Heart sounds: Normal heart sounds. No murmur heard.  Pulmonary:      Effort: Pulmonary effort is normal. No respiratory distress.      Breath sounds: Normal breath sounds. No wheezing.   Abdominal:      General: Bowel sounds are normal. There is no distension.      Palpations: Abdomen is soft. There is no mass.      Tenderness: There is no abdominal tenderness.   Musculoskeletal:         General: Swelling present.      Cervical back: Normal range of motion and neck supple.      Right lower leg: Edema (improving) present.      Left lower leg: Edema (improving) present.      Comments: Elbow and knee tenderness improving     Skin:     General: Skin is warm and dry.      Capillary Refill: Capillary refill takes 2 to 3 seconds.   Neurological:      General: No focal deficit present.      Mental Status: He is alert and oriented to person, place, and time.      Cranial Nerves: No cranial nerve deficit.      Motor: Weakness present.   Psychiatric:         Mood and Affect: Mood normal.         Behavior: Behavior normal.         Thought Content: Thought content normal.         Judgment: Judgment normal.             Significant Labs: All pertinent labs within the past 24 hours have been reviewed.    Significant Imaging: I have reviewed all pertinent imaging results/findings within the past 24 hours.    Assessment and Plan     * Severe physical deconditioning  01/17/2025  Patient requiring max assists with bed transitions and feeding. High intensity therapy recommended by therapists. Patient previously independent, living at home. Referrals for rehab placed at this time.      01/18/2025  Some notable objective clinical  improvement. Reports decreased ROM in UE bilaterally but predominantly his right UE. Radiography of the left hand and R shoulder unremarkable. Will obtain CT cervical spine to further assess for stenosis.     01/19/2025  Cervical spine CT shows right moderate foraminal stenosis at C3-4 and C5-6 secondary to spurring of the uncovertebral joints.  Will obtain MRI of spine and consult orthospine to assess to see if findings could be contributing to his significant UE weakness and pain.     1/20/2025  Patient's daughter tells me patient independently prior to hospitalization. Patient complains of upper and lower bilateral weakness. While Prograf toxicity could play a part in progressive decline, he will need additional work up. CT head unremarkable. Kidney function has returned to baseline.  Vitamin B12 pending. Neurosurgery/Neurology consult pending. He is awaiting rehab vs SNF placement.     1/21/2025  Neurology input appreciated  Follow Vitamin B12, Thiamine, Copper  Continue PT/OT  Awaiting SNF consultation    1/23/25  S/p knee aspiration today.   Cell count unremarkable  Awaiting gram stain, crystals, and culture  Continue PT/TO.   SNF  placement pending    1/24/25  Treat gout  Awaiting SNF    1/25/25  Doing better today  Moving upper and lower extremities  Continue PT/TO  Awaiting SNF placement    1/26/25  Gradually improving. He was able to sit up on side of bed with standby assistance. Continue PT/TO. Awaiting SNF placement.     1/28/2025  Awaiting SNF placement    Right knee pain  X-ray from 1/6/25 shows soft tissue swelling, degenerative changes, and small effusion.   Continues to have persistent pain and decreased ROM  S/p aspiration. Cell count does not suggest infection  Gram stain, culture, and crystal are pending  Previous uric acid level elevated  Give Colchicine 0.6 mg PO x 1 dose and monitor response.    1/24/25  Aspiration consistent with gout  Increase prednisone to 20mg BID    1/25/25  Improving  "with steroids (plan for 5 days then taper back to 5mg daily)  Allopurinol started    1/26/25  Related to gouty arthritis  Improving. Will need to continue management above    Gross hematuria  Stable, has Purewick catheter now  Getting IVF, if gets worse, may need CBI    Still persists but a little better  If no improvement tomorrow- will try CBI    Improving with good Urine output, CBI not needed  Cont close monitoring      Improving, cont IVF    1/20/25  Improved  IVFs discontinued    1/21/25  Microscopic urine >100  Add high risk urine culture  Empirically place on Rocephin  Consider Urology consultation    1/22/25  Urine culture pending    1/23/25  Urine culture negative; stop IV Rocephin  Ann catheter removed and patient is voiding well    Hyperkalemia  Hyperkalemia is likely due to Increased potassium intake.The patients most recent potassium results are listed below.  Recent Labs     01/24/25  0446 01/25/25  0445 01/26/25  0447   K 4.5 5.0 5.3*       01/26/2025  Slightly elevated today. Give Lokelma x 1 dose      CKD (chronic kidney disease) stage 4, GFR 15-29 ml/min  Creatine stable for now. BMP reviewed- noted Estimated Creatinine Clearance: 48.8 mL/min (A) (based on SCr of 1.8 mg/dL (H)). according to latest data. Based on current GFR, CKD stage is stage 4 - GFR 15-29.  Monitor UOP and serial BMP and adjust therapy as needed. Renally dose meds. Avoid nephrotoxic medications and procedures.  Pt is d/p Renal Transplant in 2015, on Prograf and Cellcept    Edwina on CKD 4- sec to Prograf toxicity- Bleeding- started on IVF  Cont IVF  Improving with IVF, Cr coming down to 3    01/26/2025  Stable, 1.8      Chronic combined systolic and diastolic congestive heart failure  Patient has Combined Systolic and Diastolic heart failure that is Chronic. On presentation their CHF was  euvolemic . Most recent BNP and echo results are listed below.  No results for input(s): "BNP" in the last 72 hours.  Latest ECHO  Results for " orders placed during the hospital encounter of 11/26/24    Echo    Interpretation Summary    Left Ventricle: The left ventricle is normal in size. Normal wall thickness. There is concentric hypertrophy. Normal wall motion. Septal motion is consistent with bundle branch block. There is moderately reduced systolic function. Ejection fraction is approximately 35%. Grade I diastolic dysfunction.    Right Ventricle: Normal right ventricular cavity size. Wall thickness is normal. Systolic function is normal.    IVC/SVC: Normal venous pressure at 3 mmHg.    Current Heart Failure Medications  metoprolol succinate (TOPROL-XL) 24 hr tablet 25 mg, Daily, Oral    Plan  - Monitor strict I&Os and daily weights.    - Place on telemetry  - Low sodium diet  - Place on fluid restriction of 1.5 L.   - Cardiology has not been consulted  - The patient's volume status is  stable    1/26/25  Episode of dyspnea yesterday. CXR shows early CHF. EF 35% with DD. He was given Lasix IV x 1 dose,. Patient was previously taking 40m BID, but this was held on admit given acute on chronic renal failure. His creatinine is now lower than usual baseline of 2.2.   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  Last A1c reviewed-   Lab Results   Component Value Date    HGBA1C 5.7 (H) 12/17/2024     Most recent fingerstick glucose reviewed-   Recent Labs   Lab 01/25/25  1737 01/25/25  2109 01/26/25  0624 01/26/25  0947   POCTGLUCOSE 369* 325* 293* 399*     Current correctional scale  Medium  Maintain anti-hyperglycemic dose as follows-   Antihyperglycemics (From admission, onward)      Start     Stop Route Frequency Ordered    01/26/25 1000  insulin glargine U-100 (Lantus) pen 15 Units         -- SubQ 2 times daily 01/26/25 0922    01/14/25 1503  insulin aspart U-100  pen 0-10 Units         -- SubQ Before meals & nightly PRN 01/14/25 1404        1/26/25  Glucose usually controlled when he was on prednisone 5mg daily. Steroid increased due to gouty arthritis and has required optimization of sliding scale long acting insulin. I suspect his glucose will stabilize when steroids are tapered.    ABL Anemia plus anemia of CKD 4      Anemia is likely due to acute blood loss which was from CKD, s/p Renal transplant and chronic disease due to Chronic Kidney Disease. Most recent hemoglobin and hematocrit are listed below.  Recent Labs     01/23/25  0449 01/24/25  0446 01/25/25  0445   HGB 8.2* 8.7* 8.6*   HCT 28.2* 30.3* 30.3*       01/25/2025  Stable  Patient has had 1-2% bandemia. Overt infection ruled out. Patient is chronically on steroids. Will closely monitor trend.      VTE Risk Mitigation (From admission, onward)           Ordered     heparin (porcine) injection 5,000 Units  Every 8 hours         01/25/25 1828     Reason for No Pharmacological VTE Prophylaxis  Once        Question:  Reasons:  Answer:  Active Bleeding    01/12/25 1729     IP VTE HIGH RISK PATIENT  Once         01/12/25 1729     Place sequential compression device  Until discontinued         01/12/25 1729                    Discharge Planning   GRETEL:      Code Status: Full Code   Medical Readiness for Discharge Date:   Discharge Plan A: Skilled Nursing Facility   Discharge Delays: None known at this time              Long Payan MD  Department of Hospital Medicine   [facility])

## 2025-01-28 NOTE — PROGRESS NOTES
Nephrology Progress Note     History of Present Illness     Mr. Whittaker is a 69 year  male with a hx of previous ESRD likely related to diabetes and hypertension that was on dialysis several years prior to receiving a living related kidney transplant from his daughter in 2015.  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  He is followed by Dr. Gonsalez of [facility] seen last on 09/24/2024 at which time his serum creatinine was 2.2.     He was seen in the emergency department on 01/06/2025 complaining of increasing lower extremity edema.  At that time his serum creatinine was 2.8.  He returned to the emergency department 01/12/2025 with persistent lower extremity edema and associated blistering.  He also complains of bilateral knee pain to the point where he is unable to ambulate.  His admission laboratory reveals a serum creatinine now up to 4.3 with a potassium of 5.4.  Nephrology has been asked to see and evaluate the patient.     As an outpatient he is chronically on Lasix 40 mg twice a day.  He denies the use of nonsteroidal anti-inflammatory drugs.  He denies dysuria hematuria or difficulty voiding.  He is chronically on Entresto.     Aspiration by Orthopedics consistent with gouty arthropathy    Interval History     Overnight/currently:  Course reviewed.  Patient looks and feels relatively well although is hypothermic on a Mi Hugger.  Fortunately his renal function is stable.  As noted he has not been out of bed much with plans to possibly transition to a skilled nursing or rehab facility.     Health Status   Allergies:    is allergic to lisinopril, actos  [pioglitazone], and metformin.    Current medications:     Current Facility-Administered Medications:      0.9%  NaCl infusion (for blood administration), , Intravenous, Q24H PRN, Carlos Mathew MD    acetaminophen tablet 650 mg, 650 mg, Oral, Q4H PRN, Jaymie Medley MD, 650 mg at 01/26/25 0930    allopurinoL tablet 100 mg, 100 mg, Oral, Daily, Yoandy Bennett MD, 100 mg at 01/28/25 0809    cholestyramine 4 gram packet 4 g, 1 packet, Oral, BID, Darya Maravilla, NP, 4 g at 01/28/25 0808    ciprofloxacin HCl 0.3 % ophthalmic solution 1 drop, 1 drop, Left Eye, BID, Darya Maravilla NP, 1 drop at 01/28/25 0808    dextrose 50% injection 12.5 g, 12.5 g, Intravenous, PRN, Jaymie Medley MD    dextrose 50% injection 25 g, 25 g, Intravenous, PRN, Jaymie Medley MD    furosemide tablet 20 mg, 20 mg, Oral, Daily, Darya Maravilla NP, 20 mg at 01/28/25 0809    gabapentin capsule 300 mg, 300 mg, Oral, TID, Chidi James MD, 300 mg at 01/28/25 0810    glucagon (human recombinant) injection 1 mg, 1 mg, Intramuscular, PRN, Jaymie Medley MD    glucose chewable tablet 16 g, 16 g, Oral, PRN, Jaymie Medley MD    glucose chewable tablet 24 g, 24 g, Oral, PRN, Jaymie Medley MD    heparin (porcine) injection 5,000 Units, 5,000 Units, Subcutaneous, Q8H, Darya Maravilla NP, 5,000 Units at 01/28/25 0626    HYDROcodone-acetaminophen 5-325 mg per tablet 1 tablet, 1 tablet, Oral, Q6H PRN, Jaymie Medley MD, 1 tablet at 01/24/25 1202    insulin aspart U-100 pen 0-10 Units, 0-10 Units, Subcutaneous, QID (AC + HS) PRN, Jaymie Medley MD, 2 Units at 01/28/25 1129    insulin glargine U-100 (Lantus) pen 20 Units, 20 Units, Subcutaneous, BID, Long Payan MD, 20 Units at 01/28/25 0819    LIDOcaine 5 % patch 2 patch, 2 patch, Transdermal, Q24H, Chidi James MD, 2 patch at 01/28/25 0903    melatonin tablet 6 mg, 6 mg, Oral, Nightly PRN, Jaymie Medley MD    metoprolol injection 5 mg, 5 mg, Intravenous, Q6H PRN, Dottie North, NP    metoprolol succinate (TOPROL-XL) 24 hr split tablet 12.5 mg, 12.5  "mg, Oral, Daily, Long Payan MD, 12.5 mg at 01/28/25 0901    mycophenolate tablet 500 mg, 500 mg, Oral, BID, Jaymie Medley MD, 500 mg at 01/28/25 0808    naloxone 0.4 mg/mL injection 0.02 mg, 0.02 mg, Intravenous, PRN, Jaymie Medley MD    nystatin 100,000 unit/mL suspension 500,000 Units, 500,000 Units, Oral, QID, Chidi James MD, 500,000 Units at 01/28/25 1319    ondansetron disintegrating tablet 8 mg, 8 mg, Oral, Q8H PRN, Jaymie Medley MD, 8 mg at 01/26/25 1727    ondansetron injection 4 mg, 4 mg, Intravenous, Q8H PRN, Jaymie Medley MD, 4 mg at 01/27/25 1729    pantoprazole EC tablet 40 mg, 40 mg, Oral, BID, Hilario Dahl PA-C, 40 mg at 01/28/25 0809    polyethylene glycol packet 17 g, 17 g, Oral, Daily PRN, Jaymie Medley MD    predniSONE tablet 20 mg, 20 mg, Oral, BID, Darya Maravilla, NP, 20 mg at 01/28/25 0809    senna-docusate 8.6-50 mg per tablet 2 tablet, 2 tablet, Oral, Daily PRN, Jaymie Medley MD    sodium bicarbonate tablet 650 mg, 650 mg, Oral, TID, Yoandy Bennett MD, 650 mg at 01/28/25 0810    sodium chloride 0.9% flush 10 mL, 10 mL, Intravenous, Q8H, Jaymie Medley MD, 10 mL at 01/28/25 0626    tacrolimus capsule 3 mg, 3 mg, Oral, BID, Jaymie Medley MD, 3 mg at 01/28/25 0810    tamsulosin 24 hr capsule 0.4 mg, 0.4 mg, Oral, QHS, Elias Watkins MD, 0.4 mg at 01/27/25 2127     Physical Examination   VS/Measurements   BP (!) 95/53   Pulse 70   Temp (!) 94 °F (34.4 °C) (Rectal) Comment: MD AWARE  Resp 18   Ht 5' 7" (1.702 m)   Wt 130.5 kg (287 lb 11.2 oz)   SpO2 97%   BMI 45.06 kg/m²      General:  Alert and oriented X3, No acute distress.         Nutritional status: Obese.    Neck:  Supple, No lymphadenopathy.     Respiratory:  Lungs are clear to auscultation, Respirations are non-labored, Symmetrical chest wall expansion.    Cardiovascular:  Normal rate, Regular rhythm.  Trace to 1+ pretibial edema  Gastrointestinal:  Soft, Non-tender, Normal bowel " sounds.   Integumentary:  Warm, Dry.   Psychiatric:  Cooperative, Appropriate mood & affect     Review / Management   Laboratory Results   Today's Lab Results :    Recent Results (from the past 24 hours)   POCT glucose    Collection Time: 01/27/25  5:10 PM   Result Value Ref Range    POCT Glucose 291 (H) 70 - 110 mg/dL   POCT glucose    Collection Time: 01/27/25  7:58 PM   Result Value Ref Range    POCT Glucose 288 (H) 70 - 110 mg/dL   POCT glucose    Collection Time: 01/28/25  5:07 AM   Result Value Ref Range    POCT Glucose 294 (H) 70 - 110 mg/dL   Renal Function Panel    Collection Time: 01/28/25  9:13 AM   Result Value Ref Range    Glucose 177 (H) 70 - 110 mg/dL    Sodium 140 136 - 145 mmol/L    Potassium 4.6 3.5 - 5.1 mmol/L    Chloride 105 95 - 110 mmol/L    CO2 24 23 - 29 mmol/L    BUN 57 (H) 8 - 23 mg/dL    Calcium 8.9 8.7 - 10.5 mg/dL    Creatinine 1.8 (H) 0.5 - 1.4 mg/dL    Albumin 1.8 (L) 3.5 - 5.2 g/dL    Phosphorus 3.8 2.7 - 4.5 mg/dL    eGFR 40 (A) >60 mL/min/1.73 m^2    Anion Gap 11 8 - 16 mmol/L   POCT glucose    Collection Time: 01/28/25 11:27 AM   Result Value Ref Range    POCT Glucose 191 (H) 70 - 110 mg/dL        Impression and Plan   Diagnosis     [place] to be secondary to possible intravascular volume depletion complicated by Prograf toxicity.   His creatinine stable back to his baseline     CKD stage 3B in his transplant kidney.  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  Followed by Dr. Gonsalez.  As above.  Continue immunosuppression with CellCept, Prograf and prednisone. Given his time out from transplant and the fact that he has a living related donor kidney transplant, a Prograf trough level of 4-6 would be adequate.  Repeat trough from 01/23/2025 5.8. his last Prograf level is within goal.         Right knee pain.  He had aspiration by Orthopedics and fluid analysis consistent with gout.  He is currently on prednisone  tapering doses and also start him on allopurinol.  --clinically much improved     Gross hematuria.  Likely Ann trauma.  Anticoagulants on hold.  This appears to be resolved.     Metabolic acidosis.  Improved on sodium bicarb supplements.     Hyperkalemia.   Resolved.     Hypercalcemia.  Corrected calcium elevated.  Electrophoresis negative.  He has been on calcitriol outpatient which is being held here.  Avoid calcium and vitamin-D.   His ionized calcium in the normal range.     Anemia.  Hematuria versus possible GI bleed while on Eliquis and aspirin.  Status post 1 unit of PRBCs and hemoglobin relatively stable at this point.  --some contribution from his underlying CKD     Hypertension CKD.  His blood pressure is slightly low and on low-dose beta-blockers.  He is asymptomatic.     HFrEF.  Currently compensated.  EF 35% 11/2024.  Followed by Dr. Man with Cardiology.  --we will resume his outpatient dose of Lasix.     Diabetes mellitus type 2.  Defer to Hospital Medicine.     Generalized debility-apparently waiting on a skilled nursing/rehab facility.  He needs more physical therapy.  At discharge he needs close follow-up with [facility].    ______________________________________________  Arturo Cooney      This document was created using voice recognition software.  It is possible that there are errors which have persisted after original proofreading.  If there is a question regarding contents of this document please contact me for clarification.

## 2025-01-28 NOTE — SUBJECTIVE & OBJECTIVE
Interval History:  No acute issues reported overnight.    Review of Systems   Constitutional:  Positive for activity change and appetite change. Negative for fever.   HENT:  Negative for hearing loss.    Eyes:  Negative for visual disturbance.   Respiratory:  Negative for cough and shortness of breath.    Cardiovascular:  Positive for leg swelling (bilateral knee pain improving). Negative for chest pain and palpitations.   Genitourinary:  Negative for difficulty urinating, dysuria, frequency and hematuria.   Musculoskeletal:  Positive for arthralgias. Negative for back pain.   Skin:  Positive for wound. Negative for color change and pallor.   Neurological:  Positive for weakness. Negative for seizures, syncope, numbness and headaches.   Hematological:  Does not bruise/bleed easily.   Psychiatric/Behavioral:  The patient is not nervous/anxious.      Objective:     Vital Signs (Most Recent):  Temp: (!) 95.2 °F (35.1 °C) (MD AWARE) (01/28/25 1444)  Pulse: 74 (01/28/25 1553)  Resp: 18 (01/28/25 1553)  BP: (!) 99/51 (01/28/25 1553)  SpO2: 98 % (01/28/25 1553) Vital Signs (24h Range):  Temp:  [94 °F (34.4 °C)-98.3 °F (36.8 °C)] 95.2 °F (35.1 °C)  Pulse:  [49-74] 74  Resp:  [18-20] 18  SpO2:  [86 %-100 %] 98 %  BP: ()/(50-84) 99/51     Weight: 130.5 kg (287 lb 11.2 oz)  Body mass index is 45.06 kg/m².  No intake or output data in the 24 hours ending 01/28/25 1611      Physical Exam  Constitutional:       General: He is not in acute distress.     Appearance: Normal appearance. He is well-developed. He is obese. He is not ill-appearing, toxic-appearing or diaphoretic.   HENT:      Head: Normocephalic and atraumatic.   Eyes:      Extraocular Movements: Extraocular movements intact.   Cardiovascular:      Rate and Rhythm: Normal rate and regular rhythm.      Heart sounds: Normal heart sounds. No murmur heard.  Pulmonary:      Effort: Pulmonary effort is normal. No respiratory distress.      Breath sounds: Normal breath  sounds. No wheezing.   Abdominal:      General: Bowel sounds are normal. There is no distension.      Palpations: Abdomen is soft. There is no mass.      Tenderness: There is no abdominal tenderness.   Musculoskeletal:         General: Swelling present.      Cervical back: Normal range of motion and neck supple.      Right lower leg: Edema (improving) present.      Left lower leg: Edema (improving) present.      Comments: Elbow and knee tenderness improving     Skin:     General: Skin is warm and dry.      Capillary Refill: Capillary refill takes 2 to 3 seconds.   Neurological:      General: No focal deficit present.      Mental Status: He is alert and oriented to person, place, and time.      Cranial Nerves: No cranial nerve deficit.      Motor: Weakness present.   Psychiatric:         Mood and Affect: Mood normal.         Behavior: Behavior normal.         Thought Content: Thought content normal.         Judgment: Judgment normal.             Significant Labs: All pertinent labs within the past 24 hours have been reviewed.    Significant Imaging: I have reviewed all pertinent imaging results/findings within the past 24 hours.

## 2025-01-28 NOTE — ASSESSMENT & PLAN NOTE
01/17/2025  Patient requiring max assists with bed transitions and feeding. High intensity therapy recommended by therapists. Patient previously independent, living at home. Referrals for rehab placed at this time.      01/18/2025  Some notable objective clinical improvement. Reports decreased ROM in UE bilaterally but predominantly his right UE. Radiography of the left hand and R shoulder unremarkable. Will obtain CT cervical spine to further assess for stenosis.     01/19/2025  Cervical spine CT shows right moderate foraminal stenosis at C3-4 and C5-6 secondary to spurring of the uncovertebral joints.  Will obtain MRI of spine and consult orthospine to assess to see if findings could be contributing to his significant UE weakness and pain.     1/20/2025  Patient's daughter tells me patient independently prior to hospitalization. Patient complains of upper and lower bilateral weakness. While Prograf toxicity could play a part in progressive decline, he will need additional work up. CT head unremarkable. Kidney function has returned to baseline.  Vitamin B12 pending. Neurosurgery/Neurology consult pending. He is awaiting rehab vs SNF placement.     1/21/2025  Neurology input appreciated  Follow Vitamin B12, Thiamine, Copper  Continue PT/OT  Awaiting SNF consultation    1/23/25  S/p knee aspiration today.   Cell count unremarkable  Awaiting gram stain, crystals, and culture  Continue PT/TO.   SNF  placement pending    1/24/25  Treat gout  Awaiting SNF    1/25/25  Doing better today  Moving upper and lower extremities  Continue PT/TO  Awaiting SNF placement    1/26/25  Gradually improving. He was able to sit up on side of bed with standby assistance. Continue PT/TO. Awaiting SNF placement.     1/28/2025  Awaiting SNF placement

## 2025-01-28 NOTE — PLAN OF CARE
HECTOR informed that pt has out of pocket costs for SNF admission. Lelia with [facility] reports speaking with pt's spouse regarding OOP costs: $200/daily + $2500. Lelia states she's awaiting CB spouse to see if she's agreeable to payment. HECTOR made room visit and spoke with pt's daughter, Marybeth. Marybeth reports her mom/pt's spouse is awaiting CB from [facility] after she spoke with pts insurance and was informed that deductible is $1000. [facility] rep updated and report she'll discuss with their business office.  MD notified of barrier to discharge.   HECTOR to follow

## 2025-01-28 NOTE — PT/OT/SLP PROGRESS
Occupational Therapy   Treatment    Name: Mitch Whittaker  MRN: [id number]  Admitting Diagnosis:  Physical deconditioning       Recommendations:     Discharge Recommendations: Moderate Intensity Therapy  Discharge Equipment Recommendations:  to be determined by next level of care  Barriers to discharge:  Decreased caregiver support    Assessment:     Mitch Whittaker is a 69 y.o. male with a medical diagnosis of Physical deconditioning.  He presents with the following performance deficits affecting function are weakness, impaired endurance, impaired self care skills, impaired functional mobility, decreased safety awareness, impaired balance, impaired cardiopulmonary response to activity, pain, impaired skin, decreased upper extremity function, decreased lower extremity function.     Rehab Prognosis:  Fair; patient would benefit from acute skilled OT services to address these deficits and reach maximum level of function.       Plan:     Patient to be seen 2 x/week to address the above listed problems via self-care/home management, therapeutic activities, therapeutic exercises  Plan of Care Expires: 01/31/25  Plan of Care Reviewed with: patient    Subjective     Chief Complaint: my legs hurt   Patient/Family Comments/goals: to get stronger  Pain/Comfort:  Pain Rating 1: 6/10  Location - Side 1: Bilateral  Location - Orientation 1: lower  Location 1: leg  Pain Addressed 1: Reposition (ax pacing)  Pain Rating Post-Intervention 1: 6/10    Objective:     Communicated with: nursing and chart review prior to session.  Patient found supine with telemetry, peripheral IV, pressure relief boots (LONI bed) upon OT entry to room.    General Precautions: Standard, fall    Orthopedic Precautions:N/A  Braces: UE brace (soft brace for comfort on R  wrist)  Respiratory Status: Nasal cannula, flow 2 L/min     Occupational Performance:     Bed Mobility:    Patient completed Scooting/Bridging with maximal assistance and 2 persons  Patient  "completed Supine to Sit with maximal assistance and 2 persons  Pt able to tolerate sitting EOB 10 mins with mod A for balance initially and progressed to CGA   Pt able to bathe face while seated EOB with no UE support   Pt reports "this feels good" referring to weight bearing through legs supported on floor during sitting EOB   Patient completed Sit to Supine with maximal assistance and 2 persons   Req A for trunk and BLE  Max A x2 for upward scoot in bed with pt able to assist using bed rail         Activities of Daily Living:  Grooming: supervision pt able to bathe face sitting EOB and oral hygiene once supine with HOB up       [facility] 6 Click ADL: 11    Treatment & Education:  Pt kalie sitting unsupported well with increased effort in assisting with bed mobility and upright posture with core activation noted. Encouraged completion of B UE AROM in all joints therex throughout the day to increase functional strength and activity tolerance needed for ADL completion.  OT role, plan of care, progression of goals, importance of continued OOB activity, ADL/functional transfer and mobility retraining, call don't fall, safety precautions, fall prevention.      Patient left supine with all lines intact, call button in reach, and bed alarm on    GOALS:   Multidisciplinary Problems       Occupational Therapy Goals          Problem: Occupational Therapy    Goal Priority Disciplines Outcome Interventions   Occupational Therapy Goal     OT, PT/OT Progressing    Description: Goals to be met by: 1/31/25     Patient will increase functional independence with ADLs by performing:    UE Dressing with Minimal Assistance.  Sitting at edge of bed x15 minutes with Stand-by Assistance.  Rolling to Bilateral with Minimal Assistance.   Supine to sit with Minimal Assistance.                         DME Justifications:  No DME recommended requiring DME justifications    Time Tracking:     OT Date of Treatment: 01/28/25  OT Start Time: 0820  OT " Stop Time: 0850  OT Total Time (min): 30 min    Billable Minutes:Self Care/Home Management 15  Therapeutic Activity 15  ZABRINA Santos  A client care conference was performed between the OTR/L and GERBER, prior to treatment by GERBER, to discuss the patient's status, treatment plan and established goals.      OT/ANNIA: ANNIA     Number of ANNIA visits since last OT visit: 1 1/28/2025

## 2025-01-28 NOTE — PLAN OF CARE
HECTOR notified by Lelia with [facility] that pt will only be responsible for $200 on admit. Lelia to discuss with spouse. Facility can admit patient tomorrow.  MD updated.  HECTOR to follow.

## 2025-01-28 NOTE — NURSING
Unable to obtain oral temp on pt. Rectal temp 94 taken at 1225-  MD notified via secure chat. Bear hugger placed on pt- rectal temp retaken at 1444- temp was 95.2. Will recheck rectal temp at 1515. BEAR HUGGER TAKEN OFF AT 1800- ORAL TEMP 98.7

## 2025-01-29 VITALS
HEART RATE: 87 BPM | HEIGHT: 67 IN | WEIGHT: 287.69 LBS | OXYGEN SATURATION: 97 % | TEMPERATURE: 97 F | RESPIRATION RATE: 16 BRPM | BODY MASS INDEX: 45.15 KG/M2 | DIASTOLIC BLOOD PRESSURE: 69 MMHG | SYSTOLIC BLOOD PRESSURE: 129 MMHG

## 2025-01-29 LAB
ALBUMIN SERPL BCP-MCNC: 1.8 G/DL (ref 3.5–5.2)
ANION GAP SERPL CALC-SCNC: 7 MMOL/L (ref 8–16)
BUN SERPL-MCNC: 66 MG/DL (ref 8–23)
CALCIUM SERPL-MCNC: 8.7 MG/DL (ref 8.7–10.5)
CHLORIDE SERPL-SCNC: 105 MMOL/L (ref 95–110)
CO2 SERPL-SCNC: 27 MMOL/L (ref 23–29)
CREAT SERPL-MCNC: 2 MG/DL (ref 0.5–1.4)
EST. GFR  (NO RACE VARIABLE): 35 ML/MIN/1.73 M^2
GLUCOSE SERPL-MCNC: 144 MG/DL (ref 70–110)
PHOSPHATE SERPL-MCNC: 3.7 MG/DL (ref 2.7–4.5)
POCT GLUCOSE: 181 MG/DL (ref 70–110)
POCT GLUCOSE: 208 MG/DL (ref 70–110)
POTASSIUM SERPL-SCNC: 4.6 MMOL/L (ref 3.5–5.1)
SODIUM SERPL-SCNC: 139 MMOL/L (ref 136–145)
VIT B1 BLD-MCNC: 44 UG/L (ref 38–122)

## 2025-01-29 PROCEDURE — 25000003 PHARM REV CODE 250: Performed by: INTERNAL MEDICINE

## 2025-01-29 PROCEDURE — 97110 THERAPEUTIC EXERCISES: CPT

## 2025-01-29 PROCEDURE — 36415 COLL VENOUS BLD VENIPUNCTURE: CPT

## 2025-01-29 PROCEDURE — 97530 THERAPEUTIC ACTIVITIES: CPT

## 2025-01-29 PROCEDURE — 99900035 HC TECH TIME PER 15 MIN (STAT)

## 2025-01-29 PROCEDURE — 25000003 PHARM REV CODE 250: Performed by: PHYSICIAN ASSISTANT

## 2025-01-29 PROCEDURE — 63600175 PHARM REV CODE 636 W HCPCS: Performed by: NURSE PRACTITIONER

## 2025-01-29 PROCEDURE — 99232 SBSQ HOSP IP/OBS MODERATE 35: CPT | Mod: ,,, | Performed by: INTERNAL MEDICINE

## 2025-01-29 PROCEDURE — 25000003 PHARM REV CODE 250: Performed by: FAMILY MEDICINE

## 2025-01-29 PROCEDURE — 25000003 PHARM REV CODE 250: Performed by: NURSE PRACTITIONER

## 2025-01-29 PROCEDURE — 94761 N-INVAS EAR/PLS OXIMETRY MLT: CPT

## 2025-01-29 PROCEDURE — 97530 THERAPEUTIC ACTIVITIES: CPT | Mod: CQ

## 2025-01-29 PROCEDURE — 94799 UNLISTED PULMONARY SVC/PX: CPT

## 2025-01-29 PROCEDURE — 80069 RENAL FUNCTION PANEL: CPT

## 2025-01-29 PROCEDURE — 25000003 PHARM REV CODE 250: Performed by: EMERGENCY MEDICINE

## 2025-01-29 PROCEDURE — 63600175 PHARM REV CODE 636 W HCPCS: Performed by: FAMILY MEDICINE

## 2025-01-29 PROCEDURE — 63600175 PHARM REV CODE 636 W HCPCS: Performed by: EMERGENCY MEDICINE

## 2025-01-29 PROCEDURE — 25000003 PHARM REV CODE 250: Performed by: STUDENT IN AN ORGANIZED HEALTH CARE EDUCATION/TRAINING PROGRAM

## 2025-01-29 PROCEDURE — A4216 STERILE WATER/SALINE, 10 ML: HCPCS | Performed by: EMERGENCY MEDICINE

## 2025-01-29 RX ORDER — HYDROCODONE BITARTRATE AND ACETAMINOPHEN 5; 325 MG/1; MG/1
1 TABLET ORAL EVERY 6 HOURS PRN
Qty: 20 TABLET | Refills: 0 | Status: ON HOLD | OUTPATIENT
Start: 2025-01-29

## 2025-01-29 RX ORDER — FUROSEMIDE 40 MG/1
40 TABLET ORAL DAILY
Status: ON HOLD
Start: 2025-01-30 | End: 2026-01-30

## 2025-01-29 RX ADMIN — LIDOCAINE 5% 2 PATCH: 700 PATCH TOPICAL at 09:01

## 2025-01-29 RX ADMIN — FUROSEMIDE 40 MG: 40 TABLET ORAL at 08:01

## 2025-01-29 RX ADMIN — GABAPENTIN 300 MG: 300 CAPSULE ORAL at 08:01

## 2025-01-29 RX ADMIN — ALLOPURINOL 100 MG: 100 TABLET ORAL at 08:01

## 2025-01-29 RX ADMIN — TACROLIMUS 3 MG: 1 CAPSULE ORAL at 08:01

## 2025-01-29 RX ADMIN — NYSTATIN 500000 UNITS: 100000 SUSPENSION ORAL at 08:01

## 2025-01-29 RX ADMIN — Medication 10 ML: at 05:01

## 2025-01-29 RX ADMIN — CHOLESTYRAMINE 4 G: 4 POWDER, FOR SUSPENSION ORAL at 08:01

## 2025-01-29 RX ADMIN — SODIUM BICARBONATE 650 MG TABLET 650 MG: at 02:01

## 2025-01-29 RX ADMIN — HEPARIN SODIUM 5000 UNITS: 5000 INJECTION INTRAVENOUS; SUBCUTANEOUS at 05:01

## 2025-01-29 RX ADMIN — METOPROLOL SUCCINATE 12.5 MG: 25 TABLET, EXTENDED RELEASE ORAL at 08:01

## 2025-01-29 RX ADMIN — PREDNISONE 20 MG: 20 TABLET ORAL at 08:01

## 2025-01-29 RX ADMIN — Medication 10 ML: at 02:01

## 2025-01-29 RX ADMIN — INSULIN GLARGINE 20 UNITS: 100 INJECTION, SOLUTION SUBCUTANEOUS at 09:01

## 2025-01-29 RX ADMIN — HEPARIN SODIUM 5000 UNITS: 5000 INJECTION INTRAVENOUS; SUBCUTANEOUS at 02:01

## 2025-01-29 RX ADMIN — PANTOPRAZOLE SODIUM 40 MG: 40 TABLET, DELAYED RELEASE ORAL at 08:01

## 2025-01-29 RX ADMIN — SODIUM BICARBONATE 650 MG TABLET 650 MG: at 08:01

## 2025-01-29 RX ADMIN — NYSTATIN 500000 UNITS: 100000 SUSPENSION ORAL at 12:01

## 2025-01-29 RX ADMIN — GABAPENTIN 300 MG: 300 CAPSULE ORAL at 02:01

## 2025-01-29 RX ADMIN — INSULIN ASPART 2 UNITS: 100 INJECTION, SOLUTION INTRAVENOUS; SUBCUTANEOUS at 05:01

## 2025-01-29 RX ADMIN — CIPROFLOXACIN HYDROCHLORIDE 1 DROP: 3 SOLUTION/ DROPS OPHTHALMIC at 08:01

## 2025-01-29 RX ADMIN — MYCOPHENOLATE MOFETIL 500 MG: 500 TABLET, FILM COATED ORAL at 08:01

## 2025-01-29 NOTE — PLAN OF CARE
[facility] - Telemetry (Hospital)  Discharge Final Note    Primary Care Provider: Valery Caal MD    Expected Discharge Date: 1/29/2025    Final Discharge Note (most recent)       Final Note - 01/29/25 1227          Post-Acute Status    Post-Acute Authorization Placement     Post-Acute Placement Status Set-up Complete/Auth obtained     Discharge Delays None known at this time                   Pt being discharge to [facility] today. Discharge clinicals provided via Epic. Nursing staff provided with number for report and to ambulance transport ([phone] for [facility] nurse).     Important Message from Medicare  Important Message from Medicare regarding Discharge Appeal Rights: Given to patient/caregiver, Explained to patient/caregiver, Signed/date by patient/caregiver     Date IMM was signed: 01/29/25  Time IMM was signed: 1050    After-discharge care                Destination       *[place]   Service: Skilled Nursing    [address] [id number]   Phone: [phone]

## 2025-01-29 NOTE — PROGRESS NOTES
Nephrology Progress Note     History of Present Illness     Mr. Whittaker is a 69 year  male with a hx of previous ESRD likely related to diabetes and hypertension that was on dialysis several years prior to receiving a living related kidney transplant from his daughter in 2015.  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  He is followed by Dr. Gonsalez of [facility] seen last on 09/24/2024 at which time his serum creatinine was 2.2.     He was seen in the emergency department on 01/06/2025 complaining of increasing lower extremity edema.  At that time his serum creatinine was 2.8.  He returned to the emergency department 01/12/2025 with persistent lower extremity edema and associated blistering.  He also complains of bilateral knee pain to the point where he is unable to ambulate.  His admission laboratory reveals a serum creatinine now up to 4.3 with a potassium of 5.4.  Nephrology has been asked to see and evaluate the patient.     As an outpatient he is chronically on Lasix 40 mg twice a day.  He denies the use of nonsteroidal anti-inflammatory drugs.  He denies dysuria hematuria or difficulty voiding.  He is also chronically on Entresto.     Aspiration by Orthopedics consistent with gouty arthropathy    Interval History     Overnight/currently:  Course reviewed.  Patient looks and feels a little better today.  He denies shortness of breath-as noted we resumed his dose of Lasix yesterday.  His serum creatinine is relatively stable.  His edema is much improved overall.  He is awaiting transition to skilled nursing/rehab.     Health Status   Allergies:    is allergic to lisinopril, actos  [pioglitazone], and metformin.    Current medications:     Current  Facility-Administered Medications:     0.9%  NaCl infusion (for blood administration), , Intravenous, Q24H PRN, Carlos Mathew MD    acetaminophen tablet 650 mg, 650 mg, Oral, Q4H PRN, Jaymie Medley MD, 650 mg at 01/26/25 0930    allopurinoL tablet 100 mg, 100 mg, Oral, Daily, Yoandy Bennett MD, 100 mg at 01/29/25 0851    cholestyramine 4 gram packet 4 g, 1 packet, Oral, BID, Darya Maravilla, NP, 4 g at 01/29/25 0850    ciprofloxacin HCl 0.3 % ophthalmic solution 1 drop, 1 drop, Left Eye, BID, Darya Maravilla, NP, 1 drop at 01/29/25 0851    dextrose 50% injection 12.5 g, 12.5 g, Intravenous, PRN, Jaymie Medley MD    dextrose 50% injection 25 g, 25 g, Intravenous, PRN, Jaymie Medley MD    furosemide tablet 40 mg, 40 mg, Oral, Daily, Arturo Cooney MD, 40 mg at 01/29/25 0851    gabapentin capsule 300 mg, 300 mg, Oral, TID, Chidi James MD, 300 mg at 01/29/25 1406    glucagon (human recombinant) injection 1 mg, 1 mg, Intramuscular, PRN, Jaymie Medley MD    glucose chewable tablet 16 g, 16 g, Oral, PRNGalindo Majid A., MD    glucose chewable tablet 24 g, 24 g, Oral, PRNGalindo Majid A., MD    heparin (porcine) injection 5,000 Units, 5,000 Units, Subcutaneous, Q8H, Darya Maravilla NP, 5,000 Units at 01/29/25 1407    HYDROcodone-acetaminophen 5-325 mg per tablet 1 tablet, 1 tablet, Oral, Q6H PRN, Jaymie Medley MD, 1 tablet at 01/24/25 1202    insulin aspart U-100 pen 0-10 Units, 0-10 Units, Subcutaneous, QID (AC + HS) PRN, Jaymie Medley MD, 2 Units at 01/29/25 0540    insulin glargine U-100 (Lantus) pen 20 Units, 20 Units, Subcutaneous, BID, Long Payan MD, 20 Units at 01/29/25 0909    LIDOcaine 5 % patch 2 patch, 2 patch, Transdermal, Q24H, Chidi James MD, 2 patch at 01/29/25 0901    melatonin tablet 6 mg, 6 mg, Oral, Nightly PRN, Jaymie Medley MD    metoprolol injection 5 mg, 5 mg, Intravenous, Q6H PRN, Dottie North, NP    metoprolol succinate (TOPROL-XL) 24  "hr split tablet 12.5 mg, 12.5 mg, Oral, Daily, Long Payan MD, 12.5 mg at 01/29/25 0851    mycophenolate tablet 500 mg, 500 mg, Oral, BID, Jyamie Medley MD, 500 mg at 01/29/25 0851    naloxone 0.4 mg/mL injection 0.02 mg, 0.02 mg, Intravenous, PRN, Jaymie Medley MD    nystatin 100,000 unit/mL suspension 500,000 Units, 500,000 Units, Oral, QID, Chidi James MD, 500,000 Units at 01/29/25 1237    ondansetron disintegrating tablet 8 mg, 8 mg, Oral, Q8H PRN, Jaymie Medley MD, 8 mg at 01/26/25 1727    ondansetron injection 4 mg, 4 mg, Intravenous, Q8H PRN, Jaymie Medley MD, 4 mg at 01/27/25 1729    pantoprazole EC tablet 40 mg, 40 mg, Oral, BID, Hilario Dahl PA-C, 40 mg at 01/29/25 0851    polyethylene glycol packet 17 g, 17 g, Oral, Daily PRN, Jaymie Medley MD    predniSONE tablet 20 mg, 20 mg, Oral, BID, Darya Maravilla, NP, 20 mg at 01/29/25 0851    senna-docusate 8.6-50 mg per tablet 2 tablet, 2 tablet, Oral, Daily PRN, Jaymie Medley MD    sodium bicarbonate tablet 650 mg, 650 mg, Oral, TID, Yoandy Bennett MD, 650 mg at 01/29/25 1406    sodium chloride 0.9% flush 10 mL, 10 mL, Intravenous, Q8H, Jaymie Medley MD, 10 mL at 01/29/25 1408    tacrolimus capsule 3 mg, 3 mg, Oral, BID, Jaymie Medley MD, 3 mg at 01/29/25 0851    tamsulosin 24 hr capsule 0.4 mg, 0.4 mg, Oral, QHS, Elias Watkins MD, 0.4 mg at 01/28/25 2200     Physical Examination   VS/Measurements   /69 (Patient Position: Lying)   Pulse 78   Temp 97.4 °F (36.3 °C) (Oral)   Resp 16   Ht 5' 7" (1.702 m)   Wt 130.5 kg (287 lb 11.2 oz)   SpO2 97%   BMI 45.06 kg/m²      General:  Alert and oriented X3, No acute distress.         Nutritional status: Obese.    Neck:  Supple, No lymphadenopathy.     Respiratory:  Lungs are clear to auscultation, Respirations are non-labored, Symmetrical chest wall expansion.    Cardiovascular:  Normal rate, Regular rhythm.  Trace to 1+ pretibial edema  Gastrointestinal:  " Soft, Non-tender, Normal bowel sounds.   Integumentary:  Warm, Dry.   Psychiatric:  Cooperative, Appropriate mood & affect      Review / Management   Laboratory Results   Today's Lab Results :    Recent Results (from the past 24 hours)   POCT glucose    Collection Time: 01/28/25  4:22 PM   Result Value Ref Range    POCT Glucose 186 (H) 70 - 110 mg/dL   Potassium    Collection Time: 01/28/25  6:31 PM   Result Value Ref Range    Potassium 4.8 3.5 - 5.1 mmol/L   Magnesium    Collection Time: 01/28/25  6:31 PM   Result Value Ref Range    Magnesium 1.9 1.6 - 2.6 mg/dL   POCT glucose    Collection Time: 01/28/25 10:11 PM   Result Value Ref Range    POCT Glucose 239 (H) 70 - 110 mg/dL   POCT glucose    Collection Time: 01/29/25  5:39 AM   Result Value Ref Range    POCT Glucose 181 (H) 70 - 110 mg/dL   Renal Function Panel    Collection Time: 01/29/25  8:41 AM   Result Value Ref Range    Glucose 144 (H) 70 - 110 mg/dL    Sodium 139 136 - 145 mmol/L    Potassium 4.6 3.5 - 5.1 mmol/L    Chloride 105 95 - 110 mmol/L    CO2 27 23 - 29 mmol/L    BUN 66 (H) 8 - 23 mg/dL    Calcium 8.7 8.7 - 10.5 mg/dL    Creatinine 2.0 (H) 0.5 - 1.4 mg/dL    Albumin 1.8 (L) 3.5 - 5.2 g/dL    Phosphorus 3.7 2.7 - 4.5 mg/dL    eGFR 35 (A) >60 mL/min/1.73 m^2    Anion Gap 7 (L) 8 - 16 mmol/L   POCT glucose    Collection Time: 01/29/25 10:50 AM   Result Value Ref Range    POCT Glucose 208 (H) 70 - 110 mg/dL        Impression and Plan   Diagnosis     ANGELITO [place] to be secondary to possible intravascular volume depletion complicated by Prograf toxicity.   His creatinine stable back to his baseline     CKD stage 3B in his transplant kidney.  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  Followed by Dr. Gonsalez.  As above.  Continue immunosuppression with CellCept, Prograf and prednisone. Given his time out from transplant and the fact that he has a living related donor kidney transplant, a Prograf  trough level of 4-6 would be adequate.  Repeat trough from 01/23/2025 5.8. his last Prograf level is within goal.         Right knee pain.  He had aspiration by Orthopedics and fluid analysis consistent with gout.  He is currently on prednisone tapering doses and also start him on allopurinol.  --clinically much improved     Gross hematuria.  Likely Ann trauma.  Anticoagulants on hold.  This appears to be resolved.     Metabolic acidosis.  Improved on sodium bicarb supplements.     Hyperkalemia.   Resolved.     Hypercalcemia.  Corrected calcium elevated.  Electrophoresis negative.  He has been on calcitriol outpatient which is being held here.  Avoid calcium and vitamin-D.   His ionized calcium in the normal range.     Anemia.  Hematuria versus possible GI bleed while on Eliquis and aspirin.  Status post 1 unit of PRBCs and hemoglobin relatively stable at this point.  --some contribution from his underlying CKD     Hypertension CKD.  His blood pressure is slightly low and on low-dose beta-blockers.  He is asymptomatic.     HFrEF.  Currently compensated.  EF 35% 11/2024.  Followed by Dr. Man with Cardiology.  --we have resumed his outpatient dose of Lasix.     Diabetes mellitus type 2.  Defer to Hospital Medicine.     Generalized debility-apparently waiting on a skilled nursing/rehab facility.  He needs more physical therapy.  At discharge he needs close follow-up with [facility].    ______________________________________________  Arturo Cooney      This document was created using voice recognition software.  It is possible that there are errors which have persisted after original proofreading.  If there is a question regarding contents of this document please contact me for clarification.

## 2025-01-29 NOTE — PT/OT/SLP PROGRESS
"Occupational Therapy   Treatment    Name: Mitch Whittaker  MRN: [id number]  Admitting Diagnosis:  Physical deconditioning       Recommendations:     Discharge Recommendations: High Intensity Therapy  Discharge Equipment Recommendations:  to be determined by next level of care  Barriers to discharge:  Decreased caregiver support (with REBEKA)    Assessment:     Mitch Whittaker is a 69 y.o. male with a medical diagnosis of Physical deconditioning.  He presents with the following performance deficits affecting function are weakness, impaired functional mobility, decreased safety awareness, impaired coordination, pain, decreased coordination, impaired endurance, impaired balance, impaired skin, decreased lower extremity function, decreased upper extremity function, impaired self care skills.     Rehab Prognosis:  Fair; patient would benefit from acute skilled OT services to address these deficits and reach maximum level of function.       Plan:     Patient to be seen 2 x/week to address the above listed problems via self-care/home management, therapeutic activities, therapeutic exercises  Plan of Care Expires: 01/31/25  Plan of Care Reviewed with: patient    Subjective     Chief Complaint: "my legs hurt, but I really wanted to stand up today"  Patient/Family Comments/goals: to get stronger  Pain/Comfort:  Pain Rating 1: 6/10  Location - Side 1:  (B KNEES)  Pain Addressed 1:  (ax pacing)    Objective:     Communicated with: nurse and chart review prior to session.  Patient found supine with telemetry, pressure relief boots, peripheral IV upon OT entry to room.    General Precautions: Standard, fall    Orthopedic Precautions:N/A  Braces: UE brace (soft brace for comfort on R  wrist)  Respiratory Status: Room air     Occupational Performance:     Bed Mobility:    Patient completed Scooting/Bridging with moderate assistance  Patient completed Supine to Sit with moderate assistance  Increased sitting balance reactions able to " sustain CGA x15 mins EOB  Patient completed Sit to Supine with maximal assistance c BLE   Able to assist but req max x2 for lat scoot EOB to HOB   Functional Mobility/Transfers:  Patient completed Sit <> Stand Transfer with maximal assistance and of 2 persons  with  hand-held assist   X6 trials   X3 trials with RW with inability to clear bottom from EOB with elevated surface  X3 trials with HHA unable to achieve pelvic position but able to clear 1/4 of surface with elevated surface  Functional Mobility: not functional at this time        [facility] 6 Click ADL: 11    Treatment & Education:  Overall, pt continues to demo great effort with assisting therapy with transitions and attempted functional sit to stand req for self care and transfers. Continues to be unsafe for OOB functional transfers at this time. TE reviewed in all planes and OT role, plan of care, progression of goals, importance of continued OOB activity, ADL/functional transfer and mobility retraining, call don't fall, safety precautions, fall prevention.      Patient left HOB elevated with all lines intact and call button in reach    GOALS:   Multidisciplinary Problems       Occupational Therapy Goals          Problem: Occupational Therapy    Goal Priority Disciplines Outcome Interventions   Occupational Therapy Goal     OT, PT/OT Progressing    Description: Goals to be met by: 1/31/25     Patient will increase functional independence with ADLs by performing:    UE Dressing with Minimal Assistance.  Sitting at edge of bed x15 minutes with Stand-by Assistance.  Rolling to Bilateral with Minimal Assistance.   Supine to sit with Minimal Assistance.                         DME Justifications:  No DME recommended requiring DME justifications    Time Tracking:     OT Date of Treatment: 01/29/25  OT Start Time: 0915  OT Stop Time: 1000  OT Total Time (min): 45 min    Billable Minutes:Therapeutic Activity 30  Therapeutic Exercise 15  Yulia Washington  GERBER      OT/ANNIA: ANNIA     Number of ANNIA visits since last OT visit: 2    1/29/2025

## 2025-01-29 NOTE — NURSING
Went over discharge instructions with patient.   Stressed importance of making and keeping all follow ups as well as making prescribed medication changes.   Patient verbalized understanding and has had all questions in regards to discharge answered to satisfaction.  IV removed without complications.  Telemetry box removed and returned to monitor tech.  Patient awaiting [facility] transportation to [facility]  Report called to [facility] at 1455 and gave report to Joyce.

## 2025-01-29 NOTE — PT/OT/SLP PROGRESS
"Physical Therapy  Treatment    Mitch Whittaker   MRN: [id number]   Admitting Diagnosis: Physical deconditioning    PT Received On: 01/29/25  PT Start Time: 0915     PT Stop Time: 1000    PT Total Time (min): 45 min       Billable Minutes:  Therapeutic Activity 45    Treatment Type: Treatment  PT/PTA: PTA     Number of PTA visits since last PT visit: 2       General Precautions: Standard, fall  Orthopedic Precautions: N/A  Braces:  (R WRIST SPLINT (DECLINED TO WEAR TODAY))  Respiratory Status: Room air    Spiritual, Cultural Beliefs, [employer] Practices, Values that Affect Care: no    Subjective:  Communicated with patient's nurse and completed Epic chart review prior to session.  Patient agreed to PT session.   "I want to stand up today. I told my daughter I'd be standing and walking by the end of the week."    Pain/Comfort  Pain Rating 1: 6/10  Location - Side 1: Bilateral  Location 1: knee  Pain Addressed 1: Other (see comments) (ACTIVITY PACING; WEARING LIDOCAINE PATCHES)  Pain Rating Post-Intervention 1: 6/10    Objective:   Patient found with: telemetry, pressure relief boots, peripheral IV    Supine L lateral slide: Total A of 2     Supine > sit EOB: Mod A     Forward scoot towards EOB: Mod A     Seated EOB x 15 min total focusing on increased tolerance to upright position, core stability, trunk control and CV endurance.   Maintained self supported sitting balance with SBA  Maintained unsupported sitting balance with  CGA    STS from EOB > RW x3 attempts but unsuccessful despite elevated bed height   From EOB w/ HHAx2: x3 attempts w/ 1 1/4 stand clearance and Max A of 2 with elevated bed height    Seated lateral scoot towards L: Max A of 2     Sit > supine: Max A     Educated patient on importance of increased tolerance to upright position and direct impact on CV endurance and strength. Patient encouraged to utilize elevated HOB to simulate chair position until able to safely complete chair T/F. Patient given a " minimum goal of majority of the day to be spent in upright, especially with all meals. Encouraged patient to perform AROM TE to BLE throughout the day within all available planes of motion. Re enforced importance of utilizing call light to meet needs in room and not attempt to get up without staff assistance. Patient verbalized understanding and agreed to comply.    Reviewed AROM TE to BLE including: hip flex/ext, knee flex/ext, ankle PF/DF  To be completed a minimum of 10 reps for each LE in order to promote return of function, strength and ROM.     AM-PAC 6 CLICK MOBILITY  How much help from another person does this patient currently need?   1 = Unable, Total/Dependent Assistance  2 = A lot, Maximum/Moderate Assistance  3 = A little, Minimum/Contact Guard/Supervision  4 = None, Modified [place]/Independent    Turning over in bed (including adjusting bedclothes, sheets and blankets)?: 2  Sitting down on and standing up from a chair with arms (e.g., wheelchair, bedside commode, etc.): 1  Moving from lying on back to sitting on the side of the bed?: 2  Moving to and from a bed to a chair (including a wheelchair)?: 1  Need to walk in hospital room?: 1  Climbing 3-5 steps with a railing?: 1 (NT)  Basic Mobility Total Score: 8    AM-PAC Raw Score CMS G-Code Modifier Level of Impairment Assistance   6 % Total / Unable   7 - 9 CM 80 - 100% Maximal Assist   10 - 14 CL 60 - 80% Moderate Assist   15 - 19 CK 40 - 60% Moderate Assist   20 - 22 CJ 20 - 40% Minimal Assist   23 CI 1-20% SBA / CGA   24 CH 0% Independent/ Mod I     Patient left HOB elevated with call button in reach.    Assessment:  Mitch Whittaker is a 69 y.o. male with a medical diagnosis of Physical deconditioning and presents with overall decline in functional mobility. Patient would continue to benefit from skilled PT to address functional limitations listed below in order to return to PLOF/decrease caregiver burden. Making progress but  potentially limited by LONI bed/ mattress. Excellent effort and motivation towards progression of mobility.     Rehab identified problem list/impairments: weakness, impaired endurance, impaired functional mobility, gait instability, impaired balance, pain, decreased safety awareness, decreased lower extremity function, impaired self care skills, decreased upper extremity function, decreased coordination, decreased ROM, impaired cardiopulmonary response to activity    Rehab potential is fair.    Activity tolerance: Fair    Discharge recommendations: Moderate Intensity Therapy      Barriers to discharge:      Equipment recommendations: to be determined by next level of care     GOALS:   Multidisciplinary Problems       Physical Therapy Goals          Problem: Physical Therapy    Goal Priority Disciplines Outcome Interventions   Physical Therapy Goal     PT, PT/OT Progressing    Description: All LTGs to be met by 2/1/25    Bed mobility Sba   Transfers SBA   Gait of at least 100 feet SBA   Pt will increase AMPAC score by 2 points to progress functional mobility  Pt will tolerate > 10 min of functional activity to progress gross functional mobility                        DME Justifications:  No DME recommended requiring DME justifications    PLAN:    Patient to be seen 3 x/week to address the above listed problems via gait training, therapeutic activities, therapeutic exercises  Plan of Care expires: 02/01/25  Plan of Care reviewed with: patient         01/29/2025

## 2025-01-29 NOTE — PLAN OF CARE
Pt oriented x4. Vital signs stable  Pt remained afebrile throughout this shift.   All meds administered per order.   Pt remained free of falls this shift.   Plan of care reviewed. Patient verbalizes understanding.   Pt moving/turning Q2  Pt Blood glucose monitored, no coverage required.  Bed in lowest position, upper side side rails up x 2, wheels locked  Call light in reach, bed alarm on.   Patient instructed to call staff for mobility/assistance.  Nonskid socks on when out of bed.  Patient education provided.  No further concerns voiced at this time.     Problem: Adult Inpatient Plan of Care  Goal: Plan of Care Review  Outcome: Progressing  Goal: Patient-Specific Goal (Individualized)  Outcome: Progressing  Goal: Absence of Hospital-Acquired Illness or Injury  Outcome: Progressing  Goal: Optimal Comfort and Wellbeing  Outcome: Progressing  Goal: Readiness for Transition of Care  Outcome: Progressing     Problem: Bariatric Environmental Safety  Goal: Safety Maintained with Care  Outcome: Progressing     Problem: Diabetes Comorbidity  Goal: Blood Glucose Level Within Targeted Range  Outcome: Progressing     Problem: Wound  Goal: Optimal Coping  Outcome: Progressing  Goal: Optimal Functional Ability  Outcome: Progressing  Goal: Absence of Infection Signs and Symptoms  Outcome: Progressing  Goal: Improved Oral Intake  Outcome: Progressing  Goal: Optimal Pain Control and Function  Outcome: Progressing  Goal: Skin Health and Integrity  Outcome: Progressing  Goal: Optimal Wound Healing  Outcome: Progressing     Problem: Skin Injury Risk Increased  Goal: Skin Health and Integrity  Outcome: Progressing     Problem: Pain Acute  Goal: Optimal Pain Control and Function  Outcome: Progressing     Problem: Infection  Goal: Absence of Infection Signs and Symptoms  Outcome: Progressing     Problem: Fall Injury Risk  Goal: Absence of Fall and Fall-Related Injury  Outcome: Progressing

## 2025-01-29 NOTE — DISCHARGE SUMMARY
- QRS ok  - incr flec to 100 bid   [facility]  Discharge Summary      Patient Name: Mitch Whittaker  MRN: [id number]  BRYANT: [id number]  Patient Class: IP- Inpatient  Admission Date: 1/12/2025  Hospital Length of Stay: 17 days  Discharge Date and Time: 1/29/2025  3:30 PM  Attending Physician: No att. providers found   Discharging Provider: Long Payan MD  Primary Care Provider: Valery Caal MD    Primary Care Team: Networked reference to record PCT     HPI:   Mitch Whittaker is a 69 y.o. male patient with a PMHx of CHF- EF 40%, anemia, hyperparathyroidism, type 2 DM, s/p kidney transplant 2015 on Prograf and Cellcept, DVT on Eliquis, MARION, HLD, HTN, CKD, Hep C, and arthritis who presents to the Emergency Department for evaluation of nausea vomiting and diarrhea over the past several days (both of which have improved), but felt weak and couldn't move around like normal. no abd pain, no systemic symptoms, stool now formed. Pt is a very poor historian. According to nursing staff, family reported that the pt has been sitting in his chair for the last 3 days. Pt reports that he has had two episodes of diarrhea since yesterday, but due to the increasing weakness in his knees he was unable to get up from his chair. Pt normally ambulates with assistance from a walker. Symptoms are constant and moderate in severity. Associated sxs include blood in stool (x4 days) and n/v yesterday, but none today after PO. Patient denies any fever, abdominal pain, appetite changes, SOB, dysuria, and all other sxs at this time. No prior tx. Pt is on Eliquis. No further complaints or concerns at this time.   In the ER, VS /65, , Sats 100% on RA, Afeb, WBC 3.7, H/H 9.5/33, Bun/Cr 82/4.3, K 6.7- treated aggressively in the ER and rechecked and repeat 5.4. He is heme positive as well. C Diff Neg. She is being admitted for possible Hyperkalemia, acute GI Bleed, ANGELITO.     * No surgery found *      Hospital Course:   69 y.o. male  patient with a PMHx of CHF- EF 40%, anemia, hyperparathyroidism, type 2 DM, s/p kidney transplant 2015 on Prograf and Cellcept, DVT on Eliquis, MARION, HLD, HTN, CKD, Hep C, and arthritis who presents to the Emergency Department for evaluation of nausea vomiting and diarrhea over the past several days (both of which have improved), but felt weak and couldn't move around like normal. no abd pain, no systemic symptoms, stool now formed. Pt is a very poor historian. According to nursing staff, family reported that the pt has been sitting in his chair for the last 3 days. Pt reports that he has had two episodes of diarrhea since yesterday, but due to the increasing weakness in his knees he was unable to get up from his chair. Pt normally ambulates with assistance from a walker. Symptoms are constant and moderate in severity. Associated sxs include blood in stool (x4 days) and n/v yesterday, but none today after PO. Patient denies any fever, abdominal pain, appetite changes, SOB, dysuria, and all other sxs at this time. No prior tx. Pt is on Eliquis. No further complaints or concerns at this time.   In the ER, VS /65, , Sats 100% on RA, Afeb, WBC 3.7, H/H 9.5/33, Bun/Cr 82/4.3, K 6.7- treated aggressively in the ER and rechecked and repeat 5.4. He is heme positive as well. C Diff Neg. She is being admitted for possible Hyperkalemia, acute GI Bleed, ANGELITO.     1/13- Appreciate Renal and GI input- pt looks and feels a little better, stronger, more alert and responsive. Wife and daughter are here. His prograf level very high- 32- hence Held. It seems that he was getting Prograf toxicity at home which then resulted in Hematuria, ABL Anemia, ANGELITO and Hyperkalemia. Pt also felt weak and low sugars, so drank a lot of OJ which further raised his K level. Does not appears to have true GI bleed. But has hematuria- hence Purewick catheter placed and Dr. Bennett also started him on IVF- hematuria appears to be clearing. Pt  feeling better, will check labs in am and start PT/OT. K was 6.1 this am- started on lokelma.     1/14- looks a little better, no melena but still has hematuria. H/h stable, 8.3/28, K still 6, Bun.Cr still high 83/4. Repeat Prograf level pending. VSS Afeb, he is more alert and talking. Wound care wrapped his legs with moderate compression. Had mod R SFA stenosis, vascular evaluated him and will continue to monitor him, no surgery or angioplasty needed at this time. Will start PT/OT in am. Cont IVF, If Hematuria persists, start CBI in am.     1/15- looks better but c/o pain in his legs which are in a compression socks. Good response to iVF, Hematuria getting better and Cr down to 3 now with good urine output. H/H dropped to 7.2/22 sec to IVF and Hematuria. Still await repeat prograf level. Getting PT/OT, started on Norco for pain control.      1/16- resting quietly, comfortable, making good amount of urine, hematuria almost cleared with IVF. Bun/Cr down to 58/2.9. K normal, lokelm d/koki. H/H improved to 7.7/26. Prograf noiw 5.2, will restart at 3 mg bid. Stop hydration in am, start PT/OT. D/c home soon.     01/17/2025  Patient requiring max assists with bed transitions and feeding. High intensity therapy recommended by therapists. Patient previously independent, living at home. Referrals for rehab placed at this time.      01/18/2025  Some notable objective clinical improvement. Reports decreased ROM in UE bilaterally but predominantly his right UE. Radiography of the left hand and R shoulder unremarkable. Will obtain CT cervical spine to further assess for stenosis.     01/19/2025  Cervical spine CT shows right moderate foraminal stenosis at C3-4 and C5-6 secondary to spurring of the uncovertebral joints.  Will obtain MRI of spine and consult orthospine to assess to see if findings could be contributing to his significant UE weakness and pain.     1/20/2025:   MRI Cervical/Thoracic/Lumbar showed multilevel  spondylosis mild to moderate but no acute findings. Patient's daughter tells me patient independently prior to hospitalization. Patient complains of upper and lower bilateral weakness. While Prograf toxicity could play a part in progressive decline, he will need additional work up. CT head unremarkable. Kidney function has returned to baseline.  Vitamin B12 pending. Neurosurgery/Neurology consult pending. He is awaiting rehab vs SNF placement.     1/21/2025  MRI brain shows atrophy and microvascular changes but no acute findings. His progressive weakness was evaluated by Neurology who recommended obtaining thiamine, copper, vitamin E, and vitamin D level. Continues to have hematuria on UA. Order high risk urine culture and empirically start Rocephin. Will consider Urology consult pending hospital course.    1/22/2025  Kidney function stable. Hypothermic overnight, improved with heating blanket. Thyroid studies and infectious work up unremarkable. Suspect hypothermia could be related to chilled room condition. Plans for SNF.     1/23/2025  Patient continues to have bilateral elbow and right knee tenderness and <ROM. Discussed X-ray of knee findings with Orthopedic surgery who agrees to perform knee aspiration.  Cell count unremarkable. Crystals, Gram stain, and cultures are still pending. Will give dose of colchicine 0.6mg x 1 and monitor response.  Ann catheter discontinued today with plans for voiding trial. Plans to discharge to SNF.    1/24/2025  Aspiration consistent with gout. Uric acid elevated at 11. Steroids increased to 20 mg BID for now for acute gout flare. Renal function continues to improve and electrolytes are stable. Awaiting SNF placement.     1/25/2025  Responding well to steroids for gout exacerbation. Moving all extremities better. Long acting insulin optimized for steroid induced hyperglycemia. Awaiting SNF.    1/26/2025  Doing well today. Patient sat up on side of bed with standby assistance  this AM. He had an episode of dyspnea after waking yesterday evening. CXR shows low grade CHF, he received Lasix 20 mg IV x 1 dose with improvement. Patient has a history of CHF previously on Lasix 40mg BID, but was held due to acute on chronic renal failure. Renal function continues to remain stable, so will start Lasix 40mg daily. Orders placed for nocturnal CPAP. Medications optimized for steroid induced hyperglycemia.     01/27/2025  Patient doing well.  Will increase Lantus to 20 units b.i.d..  Awaiting placement.  01/28/2025  Glucose better controlled.  Continue Lantus.  Awaiting placement.    Patient was discharged to SNF.     Goals of Care Treatment Preferences:  Code Status: Full Code         Consults:   Consults (From admission, onward)          Status Ordering Provider     Inpatient consult to Orthopedic Surgery  Once        Provider:  Dao Le MD    Completed SHRUTHI CANCHOLA     Inpatient consult to Social Work  Once        Provider:  (Not yet assigned)    Completed SHRUTHI CANCHOLA     Inpatient consult to Telemedicine-General Neurology  Once        Provider:  Kimo Andre MD    Completed SHRUTHI CANCHOLA     Inpatient consult to Social Work  Once        Provider:  (Not yet assigned)    Completed JET LAZAR     Inpatient consult to Diabetes educator  Once        Provider:  (Not yet assigned)    Completed MICHELE TEMPLE     Inpatient consult to Vascular Surgery  Once        Provider:  Garrett Victor MD    Completed MICHELE TEMPLE     Inpatient consult to Gastroenterology  Once        Provider:  Alix Puente MD    Completed KAILA DESAI     Inpatient consult to Nephrology  Once        Provider:  Noel Coronado MD    Completed KAILA DESAI            * Severe physical deconditioning  01/17/2025  Patient requiring max assists with bed transitions and feeding. High intensity therapy recommended by therapists. Patient previously independent, living at  home. Referrals for rehab placed at this time.      01/18/2025  Some notable objective clinical improvement. Reports decreased ROM in UE bilaterally but predominantly his right UE. Radiography of the left hand and R shoulder unremarkable. Will obtain CT cervical spine to further assess for stenosis.     01/19/2025  Cervical spine CT shows right moderate foraminal stenosis at C3-4 and C5-6 secondary to spurring of the uncovertebral joints.  Will obtain MRI of spine and consult orthospine to assess to see if findings could be contributing to his significant UE weakness and pain.     1/20/2025  Patient's daughter tells me patient independently prior to hospitalization. Patient complains of upper and lower bilateral weakness. While Prograf toxicity could play a part in progressive decline, he will need additional work up. CT head unremarkable. Kidney function has returned to baseline.  Vitamin B12 pending. Neurosurgery/Neurology consult pending. He is awaiting rehab vs SNF placement.     1/21/2025  Neurology input appreciated  Follow Vitamin B12, Thiamine, Copper  Continue PT/OT  Awaiting SNF consultation    1/23/25  S/p knee aspiration today.   Cell count unremarkable  Awaiting gram stain, crystals, and culture  Continue PT/TO.   SNF  placement pending    1/24/25  Treat gout  Awaiting SNF    1/25/25  Doing better today  Moving upper and lower extremities  Continue PT/TO  Awaiting SNF placement    1/26/25  Gradually improving. He was able to sit up on side of bed with standby assistance. Continue PT/TO. Awaiting SNF placement.     1/28/2025  Awaiting SNF placement    Right knee pain  X-ray from 1/6/25 shows soft tissue swelling, degenerative changes, and small effusion.   Continues to have persistent pain and decreased ROM  S/p aspiration. Cell count does not suggest infection  Gram stain, culture, and crystal are pending  Previous uric acid level elevated  Give Colchicine 0.6 mg PO x 1 dose and monitor  response.    1/24/25  Aspiration consistent with gout  Increase prednisone to 20mg BID    1/25/25  Improving with steroids (plan for 5 days then taper back to 5mg daily)  Allopurinol started    1/26/25  Related to gouty arthritis  Improving. Will need to continue management above    Gross hematuria  Stable, has Purewick catheter now  Getting IVF, if gets worse, may need CBI    Still persists but a little better  If no improvement tomorrow- will try CBI    Improving with good Urine output, CBI not needed  Cont close monitoring      Improving, cont IVF    1/20/25  Improved  IVFs discontinued    1/21/25  Microscopic urine >100  Add high risk urine culture  Empirically place on Rocephin  Consider Urology consultation    1/22/25  Urine culture pending    1/23/25  Urine culture negative; stop IV Rocephin  Ann catheter removed and patient is voiding well    Hyperkalemia  Hyperkalemia is likely due to Increased potassium intake.The patients most recent potassium results are listed below.  Recent Labs     01/24/25  0446 01/25/25  0445 01/26/25  0447   K 4.5 5.0 5.3*       01/26/2025  Slightly elevated today. Give Lokelma x 1 dose      CKD (chronic kidney disease) stage 4, GFR 15-29 ml/min  Creatine stable for now. BMP reviewed- noted Estimated Creatinine Clearance: 48.8 mL/min (A) (based on SCr of 1.8 mg/dL (H)). according to latest data. Based on current GFR, CKD stage is stage 4 - GFR 15-29.  Monitor UOP and serial BMP and adjust therapy as needed. Renally dose meds. Avoid nephrotoxic medications and procedures.  Pt is d/p Renal Transplant in 2015, on Prograf and Cellcept    Edwina on CKD 4- sec to Prograf toxicity- Bleeding- started on IVF  Cont IVF  Improving with IVF, Cr coming down to 3    01/26/2025  Stable, 1.8      Chronic combined systolic and diastolic congestive heart failure  Patient has Combined Systolic and Diastolic heart failure that is Chronic. On presentation their CHF was  euvolemic . Most recent BNP and  "echo results are listed below.  No results for input(s): "BNP" in the last 72 hours.  Latest ECHO  Results for orders placed during the hospital encounter of 11/26/24    Echo    Interpretation Summary    Left Ventricle: The left ventricle is normal in size. Normal wall thickness. There is concentric hypertrophy. Normal wall motion. Septal motion is consistent with bundle branch block. There is moderately reduced systolic function. Ejection fraction is approximately 35%. Grade I diastolic dysfunction.    Right Ventricle: Normal right ventricular cavity size. Wall thickness is normal. Systolic function is normal.    IVC/SVC: Normal venous pressure at 3 mmHg.    Current Heart Failure Medications  metoprolol succinate (TOPROL-XL) 24 hr tablet 25 mg, Daily, Oral    Plan  - Monitor strict I&Os and daily weights.    - Place on telemetry  - Low sodium diet  - Place on fluid restriction of 1.5 L.   - Cardiology has not been consulted  - The patient's volume status is  stable    1/26/25  Episode of dyspnea yesterday. CXR shows early CHF. EF 35% with DD. He was given Lasix IV x 1 dose,. Patient was previously taking 40m BID, but this was held on admit given acute on chronic renal failure. His creatinine is now lower than usual baseline of 2.2.   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  Last A1c reviewed-   Lab Results   Component Value Date    HGBA1C 5.7 (H) 12/17/2024     Most recent fingerstick glucose reviewed-   Recent Labs   Lab 01/25/25  1737 01/25/25  2109 01/26/25  0624 01/26/25  0947   POCTGLUCOSE 369* 325* 293* 399*     Current correctional scale  Medium  Maintain anti-hyperglycemic dose as follows-   Antihyperglycemics (From admission, onward)      Start     Stop Route Frequency Ordered    01/26/25 1000  insulin glargine " U-100 (Lantus) pen 15 Units         -- SubQ 2 times daily 01/26/25 0922    01/14/25 1503  insulin aspart U-100 pen 0-10 Units         -- SubQ Before meals & nightly PRN 01/14/25 1404        1/26/25  Glucose usually controlled when he was on prednisone 5mg daily. Steroid increased due to gouty arthritis and has required optimization of sliding scale long acting insulin. I suspect his glucose will stabilize when steroids are tapered.    ABL Anemia plus anemia of CKD 4      Anemia is likely due to acute blood loss which was from CKD, s/p Renal transplant and chronic disease due to Chronic Kidney Disease. Most recent hemoglobin and hematocrit are listed below.  Recent Labs     01/23/25  0449 01/24/25  0446 01/25/25  0445   HGB 8.2* 8.7* 8.6*   HCT 28.2* 30.3* 30.3*       01/25/2025  Stable  Patient has had 1-2% bandemia. Overt infection ruled out. Patient is chronically on steroids. Will closely monitor trend.      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    Service: Skilled Nursing  Contact information:  [address] [facility] [address] [address] [phone]                                 Patient Instructions:   No discharge procedures on file.    Significant Diagnostic Studies: N/A    Pending Diagnostic Studies:       Procedure Component Value Units Date/Time    WBC, Stool [[id number]] Collected: 01/26/25 1609    Order Status: Sent Lab Status: In process Updated: 01/26/25 1609    Specimen: Stool            Medications:  Reconciled Home Medications:      Medication List        CHANGE how you take these medications      furosemide 40 MG tablet  Commonly known as: LASIX  Take 1 tablet (40 mg total) by mouth once daily.  Start taking on: January 30, 2025  What changed:   medication strength  how much to take  how to take this  when to take this            CONTINUE taking these medications      * albuterol 2.5 mg /3 mL (0.083 %) nebulizer solution  Commonly known as: PROVENTIL  Take 3 mLs (2.5 mg total) by nebulization every 4 to 6 hours as needed for Wheezing or Shortness of Breath.     * albuterol 90 mcg/actuation inhaler  Commonly known as: PROVENTIL/VENTOLIN HFA  Inhale 2 puffs into the lungs every 4 (four) hours as needed for Wheezing or Shortness of Breath.     amitriptyline 25 MG tablet  Commonly known as: ELAVIL  Take 1 tablet (25 mg total) by mouth nightly as needed for Insomnia.     aspirin 81 MG EC tablet  Commonly known as: ECOTRIN  Take 1 tablet by mouth Daily.     BD ULTRA-FINE MINI PEN NEEDLE 31 gauge x 3/16" Ndle  Generic drug: pen needle, diabetic  Use to inject insulin as needed up to 4 times daily     blood-glucose meter kit  Commonly known as: FREESTYLE LITE METER  1 each 4 (four) times daily.     calcitRIOL 0.25 MCG Cap  Commonly known as: ROCALTROL  Take 1 capsule (0.25 mcg total) by mouth once daily.     CONTOUR NEXT TEST STRIPS Strp  Generic drug: blood sugar " diagnostic  Test blood sugar 4 (four) times daily before meals and nightly.     DEXCOM G7 SENSOR Alba  Generic drug: blood-glucose sensor  Use as directed for continuous glucose monitoring; Change every 10 days.     ELIQUIS 5 mg Tab  Generic drug: apixaban  Take 1 tablet (5 mg total) by mouth 2 (two) times daily.     ENTRESTO  mg per tablet  Generic drug: sacubitriL-valsartan  Take 1 tablet by mouth 2 (two) times daily.     famotidine 20 MG tablet  Commonly known as: PEPCID  TAKE ONE TABLET BY MOUTH EVERY EVENING     FeroSuL 325 mg (65 mg iron) Tab tablet  Generic drug: ferrous sulfate  Take 1 tablet (325 mg total) by mouth daily with breakfast.     fish oil-omega-3 fatty acids 300-1,000 mg capsule  Take 1 g by mouth once daily.     HYDROcodone-acetaminophen 5-325 mg per tablet  Commonly known as: NORCO  Take 1 tablet by mouth every 6 (six) hours as needed for Pain.     ketoconazole 200 mg Tab  Commonly known as: NIZORAL  Take ½ tablet (100 mg total) by mouth once daily.     LANTUS SOLOSTAR U-100 INSULIN 100 unit/mL (3 mL) Inpn pen  Generic drug: insulin glargine U-100 (Lantus)  Inject 30 Units into the skin 2 (two) times daily.     magnesium oxide 400 mg (241.3 mg magnesium) tablet  Commonly known as: MAG-OX  Take 1 tablet (400 mg total) by mouth once daily.     metoprolol succinate 25 MG 24 hr tablet  Commonly known as: TOPROL-XL  Take 1 tablet (25 mg total) by mouth once daily.     MICROLET LANCET Misc  Generic drug: lancets  100 lancets by Misc.(Non-Drug; Combo Route) route 4 (four) times daily before meals and nightly.     multivitamin tablet  Commonly known as: THERAGRAN  Take 1 tablet by mouth once daily.     mycophenolate 250 mg Cap  Commonly known as: CELLCEPT  Take 2 capsules (500 mg total) by mouth 2 (two) times daily.     NovoLOG Flexpen U-100 Insulin 100 unit/mL (3 mL) Inpn pen  Generic drug: insulin aspart U-100  Inject 25 Units into the skin 3 (three) times daily with meals.     ondansetron 4 MG  tablet  Commonly known as: ZOFRAN  Take 1 tablet (4 mg total) by mouth every 6 (six) hours.     ondansetron 4 MG Tbdl  Commonly known as: ZOFRAN-ODT  Dissolve 1 tablet (4 mg total) by mouth every 8 (eight) hours as needed (Nausea/vomiting).     OZEMPIC 2 mg/dose (8 mg/3 mL) Pnij  Generic drug: semaglutide  Inject 2 mg into the skin every 7 days.     potassium chloride SA 20 MEQ tablet  Commonly known as: K-DUR,KLOR-CON  Take 2 tablets (40 mEq total) by mouth once daily.     predniSONE 5 MG tablet  Commonly known as: DELTASONE  Take 1 tablet (5 mg total) by mouth once daily.     rosuvastatin 40 MG Tab  Commonly known as: CRESTOR  Take 1 tablet (40 mg total) by mouth once daily.     tacrolimus 1 MG Cap  Commonly known as: PROGRAF  Take 5 capsules (5 mg total) by mouth every 12 (twelve) hours.     triamcinolone acetonide 0.1% 0.1 % ointment  Commonly known as: KENALOG  Apply topically 2 (two) times daily. Use on bilateral lower legs.           * This list has 2 medication(s) that are the same as other medications prescribed for you. Read the directions carefully, and ask your doctor or other care provider to review them with you.                  Indwelling Lines/Drains at time of discharge:   Lines/Drains/Airways       Drain  Duration                  Hemodialysis AV Fistula Right upper arm -- days                    Time spent on the discharge of patient: 37 minutes         Long Payan MD  Department of Hospital Medicine  [facility] ([facility])

## 2025-01-29 NOTE — PLAN OF CARE
A242/A242 SB Whittaker is a 69 y.o.male admitted on 1/12/2025 for Physical deconditioning   Code Status: Full Code MRN: [id number]   Review of patient's allergies indicates:   Allergen Reactions    Lisinopril Other (See Comments)     Other reaction(s):  cough    Actos  [pioglitazone] Other (See Comments)     Other reaction(s): CHF    Metformin Other (See Comments)     Other reaction(s): renal insuff  Other reaction(s): CHF     Past Medical History:   Diagnosis Date    Acquired renal cyst of left kidney     Anemia associated with chronic renal failure     CAD (coronary artery disease)     nonobstructive [facility] 9/14    CHF (congestive heart failure)     Chronic immunosuppression with Prograf and MMF 06/18/2015    Chronic venous insufficiency of lower extremity     CKD (chronic kidney disease) stage 3, GFR 30-59 ml/min     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 RNA NEGATIVE 5/2015, 2021, 2022    History of colon polyps     HPTH (hyperparathyroidism)     Hyperlipidemia     Hypertension associated with stage 3 chronic kidney disease due to type 2 diabetes mellitus     LBBB (left bundle branch block) 12/20/2021    Morbid obesity with BMI of 45.0-49.9, adult     Nephrolithiasis 6/7/2013    PCO (posterior capsular opacification), left 03/04/2019    Proteinuria     resolved s/p transplant    S/P kidney transplant     Sleep apnea     Type 2 diabetes, uncontrolled, with retinopathy     Type II diabetes mellitus with renal manifestations       PRN meds    0.9%  NaCl infusion (for blood administration), , Q24H PRN  acetaminophen, 650 mg, Q4H PRN  dextrose 50%, 12.5 g, PRN  dextrose 50%,  25 g, PRN  glucagon (human recombinant), 1 mg, PRN  glucose, 16 g, PRN  glucose, 24 g, PRN  HYDROcodone-acetaminophen, 1 tablet, Q6H PRN  insulin aspart U-100, 0-10 Units, QID (AC + HS) PRN  melatonin, 6 mg, Nightly PRN  metoprolol, 5 mg, Q6H PRN  naloxone, 0.02 mg, PRN  ondansetron, 8 mg, Q8H PRN  ondansetron, 4 mg, Q8H PRN  polyethylene glycol, 17 g, Daily PRN  senna-docusate 8.6-50 mg, 2 tablet, Daily PRN      Chart check completed. Will continue plan of care.      Orientation: oriented x 4  [place] Coma Scale Score: 15     Lead Monitored: Lead II Rhythm: normal sinus rhythm Frequency/Ectopy: PACs  Cardiac/Telemetry Box Number: [id number]  VTE Core Measure: Pharmacological prophylaxis initiated/maintained Last Bowel Movement: 01/26/25  Diet Renal Renal, Soft & Bite Sized (IDDSI Level 6); Standard Tray  Voiding Characteristics: incontinence  [place] Score: 14  Fall Risk Score: 19  Accucheck [x]   Freq? achs     Lines/Drains/Airways       Peripheral Intravenous Line  Duration                  Hemodialysis AV Fistula Right upper arm -- days         Peripheral IV - Single Lumen 01/28/25 2242 22 G Left;Posterior Hand <1 day

## 2025-01-31 ENCOUNTER — NURSE TRIAGE (OUTPATIENT)
Dept: ADMINISTRATIVE | Facility: CLINIC | Age: 70
End: 2025-01-31
Payer: COMMERCIAL

## 2025-01-31 ENCOUNTER — TELEPHONE (OUTPATIENT)
Dept: NEPHROLOGY | Facility: CLINIC | Age: 70
End: 2025-01-31
Payer: COMMERCIAL

## 2025-01-31 NOTE — TELEPHONE ENCOUNTER
Returned call to patient's daughter. She states that patient was admitted for Prograf toxicity. He was reduced to 3mg BID. Now patient is currently in Rehab and they have started him back on 5mg BID. They would like to clarify what he should be taking. Please advise.

## 2025-01-31 NOTE — TELEPHONE ENCOUNTER
Kidney transplant pt 6/18/2015    Daughter Marybeth calling on behalf of pt. States that pt was in hospital for Prograf toxicity. States that dosage was changed. Pt is now in SNF and Marybeth states that Prograf prescription was change back to original dosage. She is calling to clarify medication dosage. Warm transfer to  for Transplant per protocol. Encounter routed to provider.      Reason for Disposition   Non-urgent call redirected to specialist's office because it is open    Protocols used: No Contact or Duplicate Contact Call-A-OH

## 2025-02-03 ENCOUNTER — TELEPHONE (OUTPATIENT)
Dept: HEMATOLOGY/ONCOLOGY | Facility: CLINIC | Age: 70
End: 2025-02-03
Payer: COMMERCIAL

## 2025-02-03 ENCOUNTER — TELEPHONE (OUTPATIENT)
Dept: TRANSPLANT | Facility: CLINIC | Age: 70
End: 2025-02-03
Payer: COMMERCIAL

## 2025-02-03 RX ORDER — TACROLIMUS 1 MG/1
3 CAPSULE ORAL 2 TIMES DAILY
COMMUNITY
End: 2025-02-06 | Stop reason: DRUGHIGH

## 2025-02-03 NOTE — TELEPHONE ENCOUNTER
"Return call to patient daughter and states that patient has been readmitted to [facility] in [place] and tacrolimus issue has been addressed.  ----- Message from Sean Lobato sent at 2/3/2025  8:39 AM CST -----    ----- Message -----  From: Forrest Huntley  Sent: 1/31/2025   4:45 PM CST  To: [facility]    Consult/Advisory    Name Of Caller: Edward Handley (Daughter)    Contact Preference?: [phone]     What is the nature of the call?: Calling to speak w/ Sean about which medications the pt should be taking now    Additional Notes:  "Thank you for all that you do for our patients"  "

## 2025-02-03 NOTE — TELEPHONE ENCOUNTER
----- Message from Rolando Hernández sent at 1/31/2025  1:50 PM CST -----    ----- Message -----  From: Holly Mccann MA  Sent: 1/31/2025  10:03 AM CST  To: Mick Martinez Staff    Lelia or Randa from [facility] needs a call back at  concerning his iron infusions.  He is in skilled nursing.

## 2025-02-03 NOTE — TELEPHONE ENCOUNTER
Returned call to Lelia or Randa concerning patient iron infusions waited on hold for 10 minutes no one came to the phone.

## 2025-02-06 ENCOUNTER — TELEPHONE (OUTPATIENT)
Dept: NEPHROLOGY | Facility: CLINIC | Age: 70
End: 2025-02-06
Payer: COMMERCIAL

## 2025-02-06 RX ORDER — TACROLIMUS 1 MG/1
4 CAPSULE ORAL EVERY MORNING
Qty: 630 CAPSULE | Refills: 3 | Status: ON HOLD | OUTPATIENT
Start: 2025-02-06

## 2025-02-06 RX ORDER — TACROLIMUS 1 MG/1
4 CAPSULE ORAL EVERY MORNING
COMMUNITY
End: 2025-02-06 | Stop reason: SDUPTHER

## 2025-02-06 NOTE — TELEPHONE ENCOUNTER
----- Message from Summer sent at 2/6/2025  8:07 AM CST -----  Contact: Dr. Balaji Petty/[facility]  Dr Carpio called asking for advice about tacrolimus (PROGRAF) 1 MG Cap. He wants to know what kind of dose he needs to be on. Please call back at [phone]. Thanks!   nasal secretions/Patent

## 2025-02-06 NOTE — TELEPHONE ENCOUNTER
----- Message from Summer sent at 2/6/2025  8:07 AM CST -----  Contact: Dr. Balaji Petty/[facility]  Dr Carpio called asking for advice about tacrolimus (PROGRAF) 1 MG Cap. He wants to know what kind of dose he needs to be on. Please call back at [phone]. Thanks!

## 2025-02-06 NOTE — TELEPHONE ENCOUNTER
Returned call pt was admitted to [facility] with low bs but is doing ok .will be d/c today to [facility]. They did a tacrolimus trough and resulted 3.7 . He wants to know if dr mendoza wants to change the dose of tacrolimus before he is transferred. I have moved his appt closer.2/5/25/sf

## 2025-02-06 NOTE — TELEPHONE ENCOUNTER
----- Message from Summer sent at 2/6/2025  8:07 AM CST -----  Contact: Dr. Balaji Petty/[facility]  Dr Carpio called asking for advice about tacrolimus (PROGRAF) 1 MG Cap. He wants to know what kind of dose he needs to be on. Please call back at [phone]. Thanks!

## 2025-02-06 NOTE — TELEPHONE ENCOUNTER
"S/w dr mendoza and he ordered to increase the tacrolimus to 4mg every am and 3mg every pm. Let dr holbrook know and said ok will do that". 2/6/25/sf   "

## 2025-02-14 ENCOUNTER — PATIENT MESSAGE (OUTPATIENT)
Dept: INTERNAL MEDICINE | Facility: CLINIC | Age: 70
End: 2025-02-14
Payer: COMMERCIAL

## 2025-02-14 NOTE — TELEPHONE ENCOUNTER
Called pt daughter she stated she will tell the nurses/doctor in nursing home.     Pt daughter had clear understanding.

## 2025-02-14 NOTE — TELEPHONE ENCOUNTER
Please advise patient/family that nursing home clinician should be managing his primary care while he is admitted to nursing facility.

## 2025-02-24 ENCOUNTER — TELEPHONE (OUTPATIENT)
Dept: NEPHROLOGY | Facility: CLINIC | Age: 70
End: 2025-02-24
Payer: COMMERCIAL

## 2025-02-24 DIAGNOSIS — Z94.0 KIDNEY REPLACED BY TRANSPLANT: Primary | ICD-10-CM

## 2025-02-24 NOTE — TELEPHONE ENCOUNTER
Spoke with patient's daughter who states that patient is currently in [facility]. She was at the facility at the time of the call. The staff there confirmed appointment on Wednesday. I informed that labs need to be drawn on pt in the morning before her take his Prograf. She expressed understanding.

## 2025-02-25 ENCOUNTER — TELEPHONE (OUTPATIENT)
Dept: NEPHROLOGY | Facility: CLINIC | Age: 70
End: 2025-02-25
Payer: COMMERCIAL

## 2025-02-25 ENCOUNTER — TELEPHONE (OUTPATIENT)
Dept: PODIATRY | Facility: CLINIC | Age: 70
End: 2025-02-25
Payer: COMMERCIAL

## 2025-02-25 NOTE — TELEPHONE ENCOUNTER
----- Message from Michelle sent at 2/25/2025  9:19 AM CST -----  Contact: pt's wife/Michelle  Type: Request a call backName of Caller: pt's wife/Lico Call Back Number: [phone] Information:  Michelle is calling in rgd to the pt being in a rehab facility and need to speak with the norse.

## 2025-02-25 NOTE — TELEPHONE ENCOUNTER
Called the pt wife Michelle went straight to , called the pt as well and he said I had to speak with his wife about his foot.             ----- Message from Iniki sent at 2/25/2025  9:29 AM CST -----  .Type: Patient Call BackWho called:Patient wifeWhat is the request in detail:  calling concerning patient foot. patient wife need to speak to someone Can the clinic reply by [facility]?   Would the patient rather a call back or a response via [facility]?Call;Best call back number:   [phone]

## 2025-02-25 NOTE — TELEPHONE ENCOUNTER
S/w dgtr and she asked the nurse at the facility while on the phone with me to fax the lab results to us when resulted. Irene the nurse said they were waiting to see if the pt was going to be coming tomorrow as he is on SNF lab was drawn this am so before the tacrolimus was given.they will lwt us knw if he can come 2/25/25/sf     sf

## 2025-02-26 ENCOUNTER — HOSPITAL ENCOUNTER (INPATIENT)
Facility: HOSPITAL | Age: 70
LOS: 14 days | Discharge: HOME HEALTH | DRG: 699 | End: 2025-03-12
Attending: FAMILY MEDICINE | Admitting: FAMILY MEDICINE
Payer: COMMERCIAL

## 2025-02-26 ENCOUNTER — TELEPHONE (OUTPATIENT)
Dept: NEPHROLOGY | Facility: CLINIC | Age: 70
End: 2025-02-26
Payer: COMMERCIAL

## 2025-02-26 DIAGNOSIS — I50.33 ACUTE ON CHRONIC DIASTOLIC CONGESTIVE HEART FAILURE: ICD-10-CM

## 2025-02-26 DIAGNOSIS — Z79.4 TYPE 2 DIABETES MELLITUS WITH STABLE PROLIFERATIVE RETINOPATHY OF BOTH EYES, WITH LONG-TERM CURRENT USE OF INSULIN: ICD-10-CM

## 2025-02-26 DIAGNOSIS — E66.01 CLASS 3 SEVERE OBESITY DUE TO EXCESS CALORIES WITH SERIOUS COMORBIDITY AND BODY MASS INDEX (BMI) OF 40.0 TO 44.9 IN ADULT: ICD-10-CM

## 2025-02-26 DIAGNOSIS — E11.42 DIABETIC SENSORIMOTOR POLYNEUROPATHY: ICD-10-CM

## 2025-02-26 DIAGNOSIS — E66.813 CLASS 3 SEVERE OBESITY DUE TO EXCESS CALORIES WITH SERIOUS COMORBIDITY AND BODY MASS INDEX (BMI) OF 40.0 TO 44.9 IN ADULT: ICD-10-CM

## 2025-02-26 DIAGNOSIS — N17.9 AKI (ACUTE KIDNEY INJURY): ICD-10-CM

## 2025-02-26 DIAGNOSIS — Z79.899 IMMUNOSUPPRESSION DUE TO DRUG THERAPY: ICD-10-CM

## 2025-02-26 DIAGNOSIS — Z94.0 KIDNEY REPLACED BY TRANSPLANT: ICD-10-CM

## 2025-02-26 DIAGNOSIS — D89.89 LAMBDA LIGHT CHAIN DISEASE: ICD-10-CM

## 2025-02-26 DIAGNOSIS — R53.81 PHYSICAL DECONDITIONING: ICD-10-CM

## 2025-02-26 DIAGNOSIS — Z94.0 KIDNEY TRANSPLANTED: Primary | ICD-10-CM

## 2025-02-26 DIAGNOSIS — D84.821 IMMUNOSUPPRESSION DUE TO DRUG THERAPY: ICD-10-CM

## 2025-02-26 DIAGNOSIS — A04.72 C. DIFFICILE COLITIS: ICD-10-CM

## 2025-02-26 DIAGNOSIS — E11.649 UNCONTROLLED TYPE 2 DIABETES MELLITUS WITH HYPOGLYCEMIA WITHOUT COMA: ICD-10-CM

## 2025-02-26 DIAGNOSIS — R06.00 DYSPNEA: ICD-10-CM

## 2025-02-26 DIAGNOSIS — E11.3553 TYPE 2 DIABETES MELLITUS WITH STABLE PROLIFERATIVE RETINOPATHY OF BOTH EYES, WITH LONG-TERM CURRENT USE OF INSULIN: ICD-10-CM

## 2025-02-26 DIAGNOSIS — D50.0 IRON DEFICIENCY ANEMIA DUE TO CHRONIC BLOOD LOSS: ICD-10-CM

## 2025-02-26 DIAGNOSIS — I50.42 CHRONIC COMBINED SYSTOLIC AND DIASTOLIC CONGESTIVE HEART FAILURE: ICD-10-CM

## 2025-02-26 DIAGNOSIS — D63.8 ANEMIA, CHRONIC DISEASE: ICD-10-CM

## 2025-02-26 DIAGNOSIS — E66.01 SEVERE OBESITY (BMI >= 40): ICD-10-CM

## 2025-02-26 DIAGNOSIS — R53.81 DEBILITY: ICD-10-CM

## 2025-02-26 DIAGNOSIS — R31.0 GROSS HEMATURIA: ICD-10-CM

## 2025-02-26 LAB
ALBUMIN SERPL BCP-MCNC: 1.9 G/DL (ref 3.5–5.2)
ALP SERPL-CCNC: 114 U/L (ref 40–150)
ALT SERPL W/O P-5'-P-CCNC: 23 U/L (ref 10–44)
ANION GAP SERPL CALC-SCNC: 11 MMOL/L (ref 8–16)
AST SERPL-CCNC: 34 U/L (ref 10–40)
BACTERIA #/AREA URNS HPF: ABNORMAL /HPF
BASOPHILS NFR BLD: 0 % (ref 0–1.9)
BILIRUB SERPL-MCNC: 0.5 MG/DL (ref 0.1–1)
BILIRUB UR QL STRIP: NEGATIVE
BNP SERPL-MCNC: 216 PG/ML (ref 0–99)
BUN SERPL-MCNC: 60 MG/DL (ref 8–23)
CALCIUM SERPL-MCNC: 8.7 MG/DL (ref 8.7–10.5)
CHLORIDE SERPL-SCNC: 100 MMOL/L (ref 95–110)
CLARITY UR: CLEAR
CO2 SERPL-SCNC: 25 MMOL/L (ref 23–29)
COLOR UR: YELLOW
CREAT SERPL-MCNC: 4.3 MG/DL (ref 0.5–1.4)
CREAT UR-MCNC: 86.5 MG/DL (ref 23–375)
DIFFERENTIAL METHOD BLD: ABNORMAL
EOSINOPHIL NFR BLD: 1 % (ref 0–8)
ERYTHROCYTE [DISTWIDTH] IN BLOOD BY AUTOMATED COUNT: 15.3 % (ref 11.5–14.5)
EST. GFR  (NO RACE VARIABLE): 14 ML/MIN/1.73 M^2
GLUCOSE SERPL-MCNC: 318 MG/DL (ref 70–110)
GLUCOSE UR QL STRIP: ABNORMAL
HCT VFR BLD AUTO: 27.1 % (ref 40–54)
HGB BLD-MCNC: 8 G/DL (ref 14–18)
HGB UR QL STRIP: ABNORMAL
HYALINE CASTS #/AREA URNS LPF: 0 /LPF
IMM GRANULOCYTES # BLD AUTO: ABNORMAL K/UL (ref 0–0.04)
IMM GRANULOCYTES NFR BLD AUTO: ABNORMAL % (ref 0–0.5)
KETONES UR QL STRIP: NEGATIVE
LEUKOCYTE ESTERASE UR QL STRIP: ABNORMAL
LYMPHOCYTES NFR BLD: 31 % (ref 18–48)
MAGNESIUM SERPL-MCNC: 1.9 MG/DL (ref 1.6–2.6)
MCH RBC QN AUTO: 24.2 PG (ref 27–31)
MCHC RBC AUTO-ENTMCNC: 29.5 G/DL (ref 32–36)
MCV RBC AUTO: 82 FL (ref 82–98)
MICROSCOPIC COMMENT: ABNORMAL
MONOCYTES NFR BLD: 2 % (ref 4–15)
NEUTROPHILS NFR BLD: 66 % (ref 38–73)
NITRITE UR QL STRIP: POSITIVE
NRBC BLD-RTO: 0 /100 WBC
PH UR STRIP: 7 [PH] (ref 5–8)
PLATELET # BLD AUTO: 213 K/UL (ref 150–450)
PMV BLD AUTO: 10.9 FL (ref 9.2–12.9)
POCT GLUCOSE: 360 MG/DL (ref 70–110)
POCT GLUCOSE: 363 MG/DL (ref 70–110)
POTASSIUM SERPL-SCNC: 2.9 MMOL/L (ref 3.5–5.1)
PROT SERPL-MCNC: 5.5 G/DL (ref 6–8.4)
PROT UR QL STRIP: ABNORMAL
RBC # BLD AUTO: 3.3 M/UL (ref 4.6–6.2)
RBC #/AREA URNS HPF: >100 /HPF (ref 0–4)
SODIUM SERPL-SCNC: 136 MMOL/L (ref 136–145)
SODIUM UR-SCNC: 54 MMOL/L (ref 20–250)
SP GR UR STRIP: 1.02 (ref 1–1.03)
URN SPEC COLLECT METH UR: ABNORMAL
UROBILINOGEN UR STRIP-ACNC: 1 EU/DL
WBC # BLD AUTO: 3.31 K/UL (ref 3.9–12.7)
WBC #/AREA URNS HPF: 10 /HPF (ref 0–5)

## 2025-02-26 PROCEDURE — 83880 ASSAY OF NATRIURETIC PEPTIDE: CPT | Performed by: FAMILY MEDICINE

## 2025-02-26 PROCEDURE — 81000 URINALYSIS NONAUTO W/SCOPE: CPT | Performed by: FAMILY MEDICINE

## 2025-02-26 PROCEDURE — 96360 HYDRATION IV INFUSION INIT: CPT

## 2025-02-26 PROCEDURE — 63600175 PHARM REV CODE 636 W HCPCS: Performed by: FAMILY MEDICINE

## 2025-02-26 PROCEDURE — 85007 BL SMEAR W/DIFF WBC COUNT: CPT | Performed by: FAMILY MEDICINE

## 2025-02-26 PROCEDURE — 11000001 HC ACUTE MED/SURG PRIVATE ROOM

## 2025-02-26 PROCEDURE — 84300 ASSAY OF URINE SODIUM: CPT | Performed by: FAMILY MEDICINE

## 2025-02-26 PROCEDURE — 51702 INSERT TEMP BLADDER CATH: CPT

## 2025-02-26 PROCEDURE — 80053 COMPREHEN METABOLIC PANEL: CPT | Performed by: FAMILY MEDICINE

## 2025-02-26 PROCEDURE — 99285 EMERGENCY DEPT VISIT HI MDM: CPT | Mod: 25

## 2025-02-26 PROCEDURE — 99223 1ST HOSP IP/OBS HIGH 75: CPT | Mod: ,,, | Performed by: INTERNAL MEDICINE

## 2025-02-26 PROCEDURE — 21400001 HC TELEMETRY ROOM

## 2025-02-26 PROCEDURE — 82570 ASSAY OF URINE CREATININE: CPT | Performed by: FAMILY MEDICINE

## 2025-02-26 PROCEDURE — 25000003 PHARM REV CODE 250: Performed by: FAMILY MEDICINE

## 2025-02-26 PROCEDURE — 83735 ASSAY OF MAGNESIUM: CPT | Performed by: FAMILY MEDICINE

## 2025-02-26 PROCEDURE — 93005 ELECTROCARDIOGRAM TRACING: CPT

## 2025-02-26 PROCEDURE — 25000003 PHARM REV CODE 250: Performed by: INTERNAL MEDICINE

## 2025-02-26 PROCEDURE — 63600175 PHARM REV CODE 636 W HCPCS: Performed by: INTERNAL MEDICINE

## 2025-02-26 PROCEDURE — 93010 ELECTROCARDIOGRAM REPORT: CPT | Mod: ,,, | Performed by: INTERNAL MEDICINE

## 2025-02-26 PROCEDURE — 85027 COMPLETE CBC AUTOMATED: CPT | Performed by: FAMILY MEDICINE

## 2025-02-26 RX ORDER — SODIUM CHLORIDE 9 MG/ML
INJECTION, SOLUTION INTRAVENOUS CONTINUOUS
Status: DISCONTINUED | OUTPATIENT
Start: 2025-02-26 | End: 2025-03-01

## 2025-02-26 RX ORDER — CALCITRIOL 0.25 UG/1
0.25 CAPSULE ORAL DAILY
Status: DISCONTINUED | OUTPATIENT
Start: 2025-02-26 | End: 2025-03-12 | Stop reason: HOSPADM

## 2025-02-26 RX ORDER — GLUCAGON 1 MG
1 KIT INJECTION
Status: DISCONTINUED | OUTPATIENT
Start: 2025-02-26 | End: 2025-03-12 | Stop reason: HOSPADM

## 2025-02-26 RX ORDER — HYDROCODONE BITARTRATE AND ACETAMINOPHEN 5; 325 MG/1; MG/1
1 TABLET ORAL EVERY 6 HOURS PRN
Refills: 0 | Status: DISCONTINUED | OUTPATIENT
Start: 2025-02-26 | End: 2025-03-01

## 2025-02-26 RX ORDER — INSULIN GLARGINE 100 [IU]/ML
30 INJECTION, SOLUTION SUBCUTANEOUS NIGHTLY
Status: DISCONTINUED | OUTPATIENT
Start: 2025-02-26 | End: 2025-03-04

## 2025-02-26 RX ORDER — PREDNISONE 5 MG/1
5 TABLET ORAL DAILY
Status: DISCONTINUED | OUTPATIENT
Start: 2025-02-27 | End: 2025-03-12 | Stop reason: HOSPADM

## 2025-02-26 RX ORDER — HYDRALAZINE HYDROCHLORIDE 20 MG/ML
10 INJECTION INTRAMUSCULAR; INTRAVENOUS EVERY 6 HOURS PRN
Status: DISCONTINUED | OUTPATIENT
Start: 2025-02-26 | End: 2025-03-12 | Stop reason: HOSPADM

## 2025-02-26 RX ORDER — TACROLIMUS 1 MG/1
3 CAPSULE ORAL EVERY EVENING
Status: DISCONTINUED | OUTPATIENT
Start: 2025-02-26 | End: 2025-03-06

## 2025-02-26 RX ORDER — ACETAMINOPHEN 325 MG/1
650 TABLET ORAL EVERY 6 HOURS PRN
Status: DISCONTINUED | OUTPATIENT
Start: 2025-02-26 | End: 2025-03-12 | Stop reason: HOSPADM

## 2025-02-26 RX ORDER — ONDANSETRON 4 MG/1
4 TABLET, ORALLY DISINTEGRATING ORAL EVERY 6 HOURS PRN
Status: DISCONTINUED | OUTPATIENT
Start: 2025-02-26 | End: 2025-03-12 | Stop reason: HOSPADM

## 2025-02-26 RX ORDER — ASPIRIN 81 MG/1
81 TABLET ORAL DAILY
Status: DISCONTINUED | OUTPATIENT
Start: 2025-02-27 | End: 2025-02-26

## 2025-02-26 RX ORDER — AMITRIPTYLINE HYDROCHLORIDE 25 MG/1
25 TABLET, FILM COATED ORAL NIGHTLY PRN
Status: DISCONTINUED | OUTPATIENT
Start: 2025-02-26 | End: 2025-03-12 | Stop reason: HOSPADM

## 2025-02-26 RX ORDER — LANOLIN ALCOHOL/MO/W.PET/CERES
1 CREAM (GRAM) TOPICAL DAILY
Status: DISCONTINUED | OUTPATIENT
Start: 2025-02-26 | End: 2025-03-12 | Stop reason: HOSPADM

## 2025-02-26 RX ORDER — IBUPROFEN 200 MG
24 TABLET ORAL
Status: DISCONTINUED | OUTPATIENT
Start: 2025-02-26 | End: 2025-03-12 | Stop reason: HOSPADM

## 2025-02-26 RX ORDER — INSULIN ASPART 100 [IU]/ML
0-10 INJECTION, SOLUTION INTRAVENOUS; SUBCUTANEOUS
Status: DISCONTINUED | OUTPATIENT
Start: 2025-02-26 | End: 2025-03-12 | Stop reason: HOSPADM

## 2025-02-26 RX ORDER — METOPROLOL SUCCINATE 25 MG/1
25 TABLET, EXTENDED RELEASE ORAL DAILY
Status: DISCONTINUED | OUTPATIENT
Start: 2025-02-27 | End: 2025-03-12 | Stop reason: HOSPADM

## 2025-02-26 RX ORDER — TACROLIMUS 1 MG/1
4 CAPSULE ORAL EVERY MORNING
Status: DISCONTINUED | OUTPATIENT
Start: 2025-02-27 | End: 2025-03-06

## 2025-02-26 RX ORDER — IBUPROFEN 200 MG
16 TABLET ORAL
Status: DISCONTINUED | OUTPATIENT
Start: 2025-02-26 | End: 2025-03-12 | Stop reason: HOSPADM

## 2025-02-26 RX ORDER — POTASSIUM CHLORIDE 20 MEQ/1
20 TABLET, EXTENDED RELEASE ORAL
Status: COMPLETED | OUTPATIENT
Start: 2025-02-26 | End: 2025-02-26

## 2025-02-26 RX ORDER — MYCOPHENOLATE MOFETIL 500 MG/1
500 TABLET ORAL 2 TIMES DAILY
Status: DISCONTINUED | OUTPATIENT
Start: 2025-02-26 | End: 2025-03-09

## 2025-02-26 RX ADMIN — CALCITRIOL CAPSULES 0.25 MCG 0.25 MCG: 0.25 CAPSULE ORAL at 05:02

## 2025-02-26 RX ADMIN — FERROUS SULFATE TAB 325 MG (65 MG ELEMENTAL FE) 1 EACH: 325 (65 FE) TAB at 05:02

## 2025-02-26 RX ADMIN — TACROLIMUS 3 MG: 1 CAPSULE ORAL at 05:02

## 2025-02-26 RX ADMIN — SODIUM CHLORIDE: 9 INJECTION, SOLUTION INTRAVENOUS at 05:02

## 2025-02-26 RX ADMIN — SODIUM CHLORIDE 500 ML: 9 INJECTION, SOLUTION INTRAVENOUS at 01:02

## 2025-02-26 RX ADMIN — INSULIN ASPART 5 UNITS: 100 INJECTION, SOLUTION INTRAVENOUS; SUBCUTANEOUS at 08:02

## 2025-02-26 RX ADMIN — POTASSIUM CHLORIDE 20 MEQ: 1500 TABLET, EXTENDED RELEASE ORAL at 04:02

## 2025-02-26 RX ADMIN — INSULIN GLARGINE 30 UNITS: 100 INJECTION, SOLUTION SUBCUTANEOUS at 08:02

## 2025-02-26 RX ADMIN — MYCOPHENOLATE MOFETIL 500 MG: 500 TABLET, FILM COATED ORAL at 08:02

## 2025-02-26 RX ADMIN — INSULIN ASPART 10 UNITS: 100 INJECTION, SOLUTION INTRAVENOUS; SUBCUTANEOUS at 05:02

## 2025-02-26 NOTE — HPI
Patient is a 69-year-old  male with a PMH of CHF, kidney transplant, CAD, DM, DVT who presents to the ED with complaints of weakness.  Patient is a resident at [facility].  He states he was sent here for renal failure.  Patient does report still producing urine however it is decreased.  Does complain of some dysuria.  Reports that oral intake is good.  Denies any fever or chills.  No other issues reported at this time.    In the ED, H&H 8.0/27, K:  2.9, BUN/CR 60/4.3.  ED discussed with Dr. Gonsalez who recommended fluid challenge due to concern with over-diuresis.

## 2025-02-26 NOTE — SUBJECTIVE & OBJECTIVE
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 RNA NEGATIVE 5/2015, 2021, 2022    History of colon polyps     HPTH (hyperparathyroidism)     Hyperlipidemia     Hypertension associated with stage 3 chronic kidney disease due to type 2 diabetes mellitus     LBBB (left bundle branch block) 12/20/2021    Morbid obesity with BMI of 45.0-49.9, adult     Nephrolithiasis 6/7/2013    PCO (posterior capsular opacification), left 03/04/2019    Proteinuria     resolved s/p transplant    S/P kidney transplant     Sleep apnea     Type 2 diabetes, uncontrolled, with retinopathy     Type II diabetes mellitus with renal manifestations        Past Surgical History:   Procedure Laterality Date    CARDIAC CATHETERIZATION  01/01/2008    normal coronary    CARPAL TUNNEL RELEASE Right 12/01/2023    Procedure: RELEASE, CARPAL TUNNEL;  Surgeon: Noel Almonte MD;  Location: [facility] OR;  Service: Orthopedics;  Laterality: Right;    CARPAL TUNNEL RELEASE Left 7/18/2024    Procedure: RELEASE, CARPAL TUNNEL;  Surgeon: Noel Almonte MD;  Location: [facility] OR;  Service: Orthopedics;  Laterality: Left;    COLONOSCOPY N/A 04/05/2018    Procedure: COLONOSCOPY;  Surgeon: Chava Ronquillo MD;  Location: [facility] ENDO;   "Service: Endoscopy;  Laterality: N/A;    COLONOSCOPY N/A 05/02/2022    Procedure: COLONOSCOPY;  Surgeon: Alix Puente MD;  Location: [facility];  Service: Endoscopy;  Laterality: N/A;    COLONOSCOPY N/A 06/07/2023    Procedure: COLONOSCOPY - rule out CMV  Cardiac clearance/Eliquis hold approval received on 05/21/23 per Dr. Meade, cardiology.  Note in encounters.  LB;  Surgeon: Daniella Shah MD;  Location: [facility] ENDO;  Service: Endoscopy;  Laterality: N/A;    ESOPHAGOGASTRODUODENOSCOPY N/A 03/26/2024    Procedure: EGD (ESOPHAGOGASTRODUODENOSCOPY) 3/1-pt cleared, ok to hold eliquis for 3 days;  Surgeon: Daniella Shah MD;  Location: [facility];  Service: Endoscopy;  Laterality: N/A;    KIDNEY TRANSPLANT  2015    RETINAL LASER PROCEDURE         Review of patient's allergies indicates:   Allergen Reactions    Lisinopril Other (See Comments)     Other reaction(s):  cough    Actos  [pioglitazone] Other (See Comments)     Other reaction(s): CHF    Metformin Other (See Comments)     Other reaction(s): renal insuff  Other reaction(s): CHF       Current Facility-Administered Medications on File Prior to Encounter   Medication    acetaminophen tablet 650 mg     Current Outpatient Medications on File Prior to Encounter   Medication Sig    albuterol (PROVENTIL) 2.5 mg /3 mL (0.083 %) nebulizer solution Take 3 mLs (2.5 mg total) by nebulization every 4 to 6 hours as needed for Wheezing or Shortness of Breath.    albuterol (PROVENTIL/VENTOLIN HFA) 90 mcg/actuation inhaler Inhale 2 puffs into the lungs every 4 (four) hours as needed for Wheezing or Shortness of Breath.    amitriptyline (ELAVIL) 25 MG tablet Take 1 tablet (25 mg total) by mouth nightly as needed for Insomnia.    apixaban (ELIQUIS) 5 mg Tab Take 1 tablet (5 mg total) by mouth 2 (two) times daily.    aspirin (ECOTRIN) 81 MG EC tablet Take 1 tablet by mouth Daily.     BD ULTRA-FINE MINI PEN NEEDLE 31 gauge x 3/16" Ndle Use to inject insulin as needed up " to 4 times daily    blood sugar diagnostic (CONTOUR NEXT TEST STRIPS) Strp Test blood sugar 4 (four) times daily before meals and nightly.    blood-glucose meter (FREESTYLE LITE METER) kit 1 each 4 (four) times daily.    blood-glucose sensor (DEXCOM G7 SENSOR) Alba Use as directed for continuous glucose monitoring; Change every 10 days.    calcitRIOL (ROCALTROL) 0.25 MCG Cap Take 1 capsule (0.25 mcg total) by mouth once daily.    famotidine (PEPCID) 20 MG tablet TAKE ONE TABLET BY MOUTH EVERY EVENING    ferrous sulfate (FEOSOL) 325 mg (65 mg iron) Tab tablet Take 1 tablet (325 mg total) by mouth daily with breakfast.    fish oil-omega-3 fatty acids 300-1,000 mg capsule Take 1 g by mouth once daily.    furosemide (LASIX) 40 MG tablet Take 1 tablet (40 mg total) by mouth once daily.    HYDROcodone-acetaminophen (NORCO) 5-325 mg per tablet Take 1 tablet by mouth every 6 (six) hours as needed for Pain.    insulin glargine U-100, Lantus, (LANTUS SOLOSTAR U-100 INSULIN) 100 unit/mL (3 mL) InPn pen Inject 30 Units into the skin 2 (two) times daily.    ketoconazole (NIZORAL) 200 mg Tab Take ½ tablet (100 mg total) by mouth once daily.    lancets (MICROLET LANCET) Misc 100 lancets by Misc.(Non-Drug; Combo Route) route 4 (four) times daily before meals and nightly.    magnesium oxide (MAG-OX) 400 mg (241.3 mg magnesium) tablet Take 1 tablet (400 mg total) by mouth once daily.    metoprolol succinate (TOPROL-XL) 25 MG 24 hr tablet Take 1 tablet (25 mg total) by mouth once daily.    multivitamin (THERAGRAN) tablet Take 1 tablet by mouth once daily.    mycophenolate (CELLCEPT) 250 mg Cap Take 2 capsules (500 mg total) by mouth 2 (two) times daily.    NOVOLOG FLEXPEN U-100 INSULIN 100 unit/mL (3 mL) InPn pen Inject 25 Units into the skin 3 (three) times daily with meals.    ondansetron (ZOFRAN) 4 MG tablet Take 1 tablet (4 mg total) by mouth every 6 (six) hours.    ondansetron (ZOFRAN-ODT) 4 MG TbDL Dissolve 1 tablet (4 mg total)  by mouth every 8 (eight) hours as needed (Nausea/vomiting).    potassium chloride SA (K-DUR,KLOR-CON) 20 MEQ tablet Take 2 tablets (40 mEq total) by mouth once daily.    predniSONE (DELTASONE) 5 MG tablet Take 1 tablet (5 mg total) by mouth once daily.    rosuvastatin (CRESTOR) 40 MG Tab Take 1 tablet (40 mg total) by mouth once daily.    sacubitriL-valsartan (ENTRESTO)  mg per tablet Take 1 tablet by mouth 2 (two) times daily.    semaglutide (OZEMPIC) 2 mg/dose (8 mg/3 mL) PnIj Inject 2 mg into the skin every 7 days.    tacrolimus (PROGRAF) 1 MG Cap Take 4 capsules (4mg) every morning AND take 3 capsules (3mg) by mouth every evening    triamcinolone acetonide 0.1% (KENALOG) 0.1 % ointment Apply topically 2 (two) times daily. Use on bilateral lower legs.     Family History       Problem Relation (Age of Onset)    Diabetes Mother, Sister, Maternal Grandmother    Heart failure Mother, Father    Hypertension Mother    Kidney disease Sister          Tobacco Use    Smoking status: Former     Current packs/day: 0.00     Types: Cigarettes     Quit date: 2013     Years since quittin.7     Passive exposure: Past    Smokeless tobacco: Former     Quit date: 2013    Tobacco comments:     used marijuana since 4824-7178, stopped after started dialysis   Substance and Sexual Activity    Alcohol use: No     Alcohol/week: 0.0 standard drinks of alcohol    Drug use: Not Currently     Comment:      Sexual activity: Never     Review of Systems   Constitutional:  Positive for fatigue. Negative for fever.   HENT:  Negative for sinus pressure.    Eyes:  Negative for visual disturbance.   Respiratory:  Negative for shortness of breath.    Cardiovascular:  Negative for chest pain.   Gastrointestinal:  Negative for nausea and vomiting.   Genitourinary:  Positive for decreased urine volume, difficulty urinating and dysuria.   Musculoskeletal:  Negative for back pain.   Skin:  Negative for rash.   Neurological:  Negative  for headaches.   Psychiatric/Behavioral:  Negative for confusion.      Objective:     Vital Signs (Most Recent):  Temp: 98.3 °F (36.8 °C) (02/26/25 1222)  Pulse: 89 (02/26/25 1503)  Resp: (!) 25 (02/26/25 1503)  BP: (!) 119/57 (02/26/25 1503)  SpO2: 96 % (02/26/25 1503) Vital Signs (24h Range):  Temp:  [98.3 °F (36.8 °C)] 98.3 °F (36.8 °C)  Pulse:  [82-95] 89  Resp:  [16-27] 25  SpO2:  [96 %-98 %] 96 %  BP: ()/(35-95) 119/57     Weight: 120.7 kg (266 lb 1.5 oz)  Body mass index is 41.68 kg/m².     Physical Exam  Constitutional:       General: He is not in acute distress.     Appearance: He is well-developed. He is obese. He is not diaphoretic.   HENT:      Head: Normocephalic and atraumatic.   Eyes:      Pupils: Pupils are equal, round, and reactive to light.   Cardiovascular:      Rate and Rhythm: Normal rate and regular rhythm.      Heart sounds: Normal heart sounds. No murmur heard.     No friction rub. No gallop.   Pulmonary:      Effort: Pulmonary effort is normal. No respiratory distress.      Breath sounds: Normal breath sounds. No stridor. No wheezing or rales.   Abdominal:      General: Bowel sounds are normal. There is no distension.      Palpations: Abdomen is soft. There is no mass.      Tenderness: There is no abdominal tenderness. There is no guarding.   Genitourinary:     Comments: Ann in place, hematuria noted  Skin:     General: Skin is warm.      Findings: No erythema.   Neurological:      Mental Status: He is alert and oriented to person, place, and time.              CRANIAL NERVES     CN III, IV, VI   Pupils are equal, round, and reactive to light.       Significant Labs:   Results for orders placed or performed during the hospital encounter of 02/26/25   Brain natriuretic peptide    Collection Time: 02/26/25 12:48 PM   Result Value Ref Range     (H) 0 - 99 pg/mL   CBC auto differential    Collection Time: 02/26/25 12:48 PM   Result Value Ref Range    WBC 3.31 (L) 3.90 - 12.70  K/uL    RBC 3.30 (L) 4.60 - 6.20 M/uL    Hemoglobin 8.0 (L) 14.0 - 18.0 g/dL    Hematocrit 27.1 (L) 40.0 - 54.0 %    MCV 82 82 - 98 fL    MCH 24.2 (L) 27.0 - 31.0 pg    MCHC 29.5 (L) 32.0 - 36.0 g/dL    RDW 15.3 (H) 11.5 - 14.5 %    Platelets 213 150 - 450 K/uL    MPV 10.9 9.2 - 12.9 fL    Immature Granulocytes CANCELED 0.0 - 0.5 %    Immature Grans (Abs) CANCELED 0.00 - 0.04 K/uL    nRBC 0 0 /100 WBC    Gran % 66.0 38.0 - 73.0 %    Lymph % 31.0 18.0 - 48.0 %    Mono % 2.0 (L) 4.0 - 15.0 %    Eosinophil % 1.0 0.0 - 8.0 %    Basophil % 0.0 0.0 - 1.9 %    Differential Method Manual    Comprehensive metabolic panel    Collection Time: 02/26/25 12:48 PM   Result Value Ref Range    Sodium 136 136 - 145 mmol/L    Potassium 2.9 (L) 3.5 - 5.1 mmol/L    Chloride 100 95 - 110 mmol/L    CO2 25 23 - 29 mmol/L    Glucose 318 (H) 70 - 110 mg/dL    BUN 60 (H) 8 - 23 mg/dL    Creatinine 4.3 (H) 0.5 - 1.4 mg/dL    Calcium 8.7 8.7 - 10.5 mg/dL    Total Protein 5.5 (L) 6.0 - 8.4 g/dL    Albumin 1.9 (L) 3.5 - 5.2 g/dL    Total Bilirubin 0.5 0.1 - 1.0 mg/dL    Alkaline Phosphatase 114 40 - 150 U/L    AST 34 10 - 40 U/L    ALT 23 10 - 44 U/L    eGFR 14 (A) >60 mL/min/1.73 m^2    Anion Gap 11 8 - 16 mmol/L     *Note: Due to a large number of results and/or encounters for the requested time period, some results have not been displayed. A complete set of results can be found in Results Review.        Significant Imaging:   Imaging Results              X-Ray Chest 1 View (Final result)  Result time 02/26/25 13:46:25      Final result by Joe Leach MD (02/26/25 13:46:25)                   Impression:      1.  Negative for acute process involving the chest, considering there are chronically low lung volumes.    2.  Stable findings as noted above.      Electronically signed by: Joe Leach MD  Date:    02/26/2025  Time:    13:46               Narrative:    EXAMINATION:  XR CHEST 1 VIEW    CLINICAL HISTORY:  Dyspnea,  unspecified    COMPARISON:  January 25, 2025, as well as studies dating back to February 10, 2023.    FINDINGS:  EKG leads overlie the chest.  Chronically low lung volumes.  The lungs are free of new pulmonary opacities.  The cardiac silhouette size is enlarged.  The trachea is midline and the mediastinal width is normal. Negative for focal infiltrate, effusion or pneumothorax. Pulmonary vasculature is normal. Negative for osseous abnormalities. Tortuous aorta with calcifications of the aortic knob.  There are degenerative changes of the spine and both shoulder girdles.  Right axillary region vascular stent again seen.

## 2025-02-26 NOTE — TELEPHONE ENCOUNTER
Please advise if patient need to come to his appointment or go to the ER per MAGALIE Magallanes? Message below.

## 2025-02-26 NOTE — ASSESSMENT & PLAN NOTE
Body mass index is 41.68 kg/m². Morbid obesity complicates all aspects of disease management from diagnostic modalities to treatment. Weight loss encouraged and health benefits explained to patient.

## 2025-02-26 NOTE — TELEPHONE ENCOUNTER
Called Ms. Magallanes to inform to have patient come to his appointment. She states that she already sent him out to the ER. Informed Dr. Gonsalez.

## 2025-02-26 NOTE — ED PROVIDER NOTES
"SCRIBE #1 NOTE: I, Anthony Urbina, am scribing for, and in the presence of, Arturo Dukes MD. I have scribed the entire note.       History     Chief Complaint   Patient presents with    abnormal labs     [facility] reports leonardo oaks sent the pt. Out for renal failure.      Review of patient's allergies indicates:   Allergen Reactions    Lisinopril Other (See Comments)     Other reaction(s):  cough    Actos  [pioglitazone] Other (See Comments)     Other reaction(s): CHF    Metformin Other (See Comments)     Other reaction(s): renal insuff  Other reaction(s): CHF         History of Present Illness     HPI    2/26/2025, 12:39 PM  History obtained from the patient, medical records, and [facility]      History of Present Illness: Mitch Whittaker is a 69 y.o. male patient with a PMHx of CHF, HPTH, heart failure, edema, DM II, end stage kidney disease, CAD, PCO, and LBBB who presents to the Emergency Department for evaluation of weakness which began this morning. [facility] reports Carlota pan sent the pt to the ED for renal failure. Pt states that the head nurse woke him out of his sleep and told him he had "bad kidney function."  Pt has had past kidney transplant. Symptoms are constant and moderate in severity. No mitigating or exacerbating factors reported. No associated sxs. Patient denies any CP, SOB, nausea, vomiting or decreased urine. No prior Tx specified.  No further complaints or concerns at this time.       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 RNA NEGATIVE 5/2015, 2021, 2022    History of colon polyps     HPTH (hyperparathyroidism)     Hyperlipidemia     Hypertension associated with stage 3 chronic kidney disease due to type 2 diabetes mellitus     LBBB (left bundle branch block) 12/20/2021    Morbid obesity with BMI of 45.0-49.9, adult     Nephrolithiasis 6/7/2013    PCO (posterior capsular opacification), left 03/04/2019    Proteinuria     resolved s/p transplant    S/P kidney transplant     Sleep apnea     Type 2 diabetes, uncontrolled, with retinopathy     Type II diabetes mellitus with renal manifestations        Past Surgical History:  Past Surgical History:   Procedure Laterality Date    CARDIAC CATHETERIZATION  01/01/2008    normal coronary    CARPAL TUNNEL RELEASE Right 12/01/2023    Procedure: RELEASE, CARPAL TUNNEL;  Surgeon: Noel Almonte MD;  Location: [facility] OR;  Service: Orthopedics;  Laterality: Right;    CARPAL TUNNEL RELEASE Left 7/18/2024    Procedure: RELEASE, CARPAL TUNNEL;  Surgeon: Noel Almonte MD;  Location: [facility] OR;  Service: Orthopedics;  Laterality: Left;    COLONOSCOPY N/A 04/05/2018    Procedure: COLONOSCOPY;  Surgeon: Chava Ronquillo MD;  Location: [facility] ENDO;  Service: Endoscopy;  Laterality: N/A;    COLONOSCOPY N/A 05/02/2022    Procedure: COLONOSCOPY;  Surgeon: Alix Puente MD;  Location: [facility] ENDO;  Service: Endoscopy;  Laterality: N/A;    COLONOSCOPY N/A 06/07/2023    Procedure: COLONOSCOPY - rule out CMV  Cardiac clearance/Eliquis hold approval received on 05/21/23 per Dr. Meade, cardiology.  Note in encounters.  LB;  Surgeon: Daniella Shah MD;   Location: [facility];  Service: Endoscopy;  Laterality: N/A;    ESOPHAGOGASTRODUODENOSCOPY N/A 2024    Procedure: EGD (ESOPHAGOGASTRODUODENOSCOPY) 3/1-pt cleared, ok to hold eliquis for 3 days;  Surgeon: Daniella Shah MD;  Location: [facility];  Service: Endoscopy;  Laterality: N/A;    KIDNEY TRANSPLANT  2015    RETINAL LASER PROCEDURE           Family History:  Family History   Problem Relation Name Age of Onset    Diabetes Mother      Hypertension Mother      Heart failure Mother      Heart failure Father      Kidney disease Sister          ESRD    Diabetes Sister      Diabetes Maternal Grandmother      Cancer Neg Hx         Social History:  Social History     Tobacco Use    Smoking status: Former     Current packs/day: 0.00     Types: Cigarettes     Quit date: 2013     Years since quittin.7     Passive exposure: Past    Smokeless tobacco: Former     Quit date: 2013    Tobacco comments:     used marijuana since [id number], stopped after started dialysis   Substance and Sexual Activity    Alcohol use: No     Alcohol/week: 0.0 standard drinks of alcohol    Drug use: Not Currently     Comment:      Sexual activity: Never        Review of Systems     Review of Systems   Respiratory:  Negative for shortness of breath.    Cardiovascular:  Negative for chest pain.   Gastrointestinal:  Negative for nausea and vomiting.   Genitourinary:  Negative for decreased urine volume.   Neurological:  Positive for weakness.        Physical Exam     Initial Vitals [25 1222]   BP Pulse Resp Temp SpO2   (!) 90/35 95 16 98.3 °F (36.8 °C) 98 %      MAP       --          Physical Exam  Nursing Notes and Vital Signs Reviewed.  Constitutional: Patient is in no acute distress. Well-developed and well-nourished.  Head: Atraumatic. Normocephalic.  Eyes: PERRL. EOM intact. Conjunctivae are not pale. No scleral icterus.  ENT: Mucous membranes are moist. Oropharynx is clear and symmetric.    Neck: Supple. Full ROM.  No lymphadenopathy.  Cardiovascular: Regular rate. Regular rhythm. No murmurs, rubs, or gallops. Distal pulses are 2+ and symmetric.  Pulmonary/Chest: No respiratory distress. Clear to auscultation bilaterally. No wheezing or rales.  Abdominal: Soft and non-distended.  There is no tenderness.  No rebound, guarding, or rigidity. Good bowel sounds.  Genitourinary: No CVA tenderness.  Musculoskeletal: Moves all extremities. No obvious deformities. No edema. No calf tenderness.  Skin: Warm and dry.  Neurological:  Alert, awake, and appropriate.  Normal speech.  No acute focal neurological deficits are appreciated.  Psychiatric: Normal affect. Good eye contact. Appropriate in content.     ED Course   Procedures  ED Vital Signs:  Vitals:    03/02/25 1652 03/02/25 1754 03/02/25 1900 03/02/25 2001   BP: 132/61   (!) 112/57   Pulse: 100 102 94 97   Resp: 20   18   Temp: 98.7 °F (37.1 °C)   98.5 °F (36.9 °C)   TempSrc: Oral   Oral   SpO2: 96%   97%   Weight:       Height:        03/02/25 2028 03/02/25 2100 03/02/25 2300 03/03/25 0014   BP:    105/62   Pulse: 92 102 94 100   Resp:    19   Temp:    98.8 °F (37.1 °C)   TempSrc:       SpO2:    98%   Weight:       Height:        03/03/25 0100 03/03/25 0300 03/03/25 0418 03/03/25 0429   BP:    (!) 114/56   Pulse: 98 92 96 94   Resp:    19   Temp:    98.5 °F (36.9 °C)   TempSrc:    Oral   SpO2:    96%   Weight:       Height:        03/03/25 0500 03/03/25 0814 03/03/25 0837   BP:  114/87    Pulse: 94 94 105   Resp:  17    Temp:  98.1 °F (36.7 °C)    TempSrc:  Oral    SpO2:  97%    Weight:      Height:          Abnormal Lab Results:  Labs Reviewed   B-TYPE NATRIURETIC PEPTIDE - Abnormal       Result Value     (*)    CBC W/ AUTO DIFFERENTIAL - Abnormal    WBC 3.31 (*)     RBC 3.30 (*)     Hemoglobin 8.0 (*)     Hematocrit 27.1 (*)     MCV 82      MCH 24.2 (*)     MCHC 29.5 (*)     RDW 15.3 (*)     Platelets 213      MPV 10.9      Immature Granulocytes CANCELED      Immature  Grans (Abs) CANCELED      nRBC 0      Gran % 66.0      Lymph % 31.0      Mono % 2.0 (*)     Eosinophil % 1.0      Basophil % 0.0      Differential Method Manual     COMPREHENSIVE METABOLIC PANEL - Abnormal    Sodium 136      Potassium 2.9 (*)     Chloride 100      CO2 25      Glucose 318 (*)     BUN 60 (*)     Creatinine 4.3 (*)     Calcium 8.7      Total Protein 5.5 (*)     Albumin 1.9 (*)     Total Bilirubin 0.5      Alkaline Phosphatase 114      AST 34      ALT 23      eGFR 14 (*)     Anion Gap 11     MAGNESIUM   MAGNESIUM    Magnesium 1.9     POCT GLUCOSE MONITORING CONTINUOUS        All Lab Results:  Results for orders placed or performed during the hospital encounter of 02/26/25   Brain natriuretic peptide    Collection Time: 02/26/25 12:48 PM   Result Value Ref Range     (H) 0 - 99 pg/mL   CBC auto differential    Collection Time: 02/26/25 12:48 PM   Result Value Ref Range    WBC 3.31 (L) 3.90 - 12.70 K/uL    RBC 3.30 (L) 4.60 - 6.20 M/uL    Hemoglobin 8.0 (L) 14.0 - 18.0 g/dL    Hematocrit 27.1 (L) 40.0 - 54.0 %    MCV 82 82 - 98 fL    MCH 24.2 (L) 27.0 - 31.0 pg    MCHC 29.5 (L) 32.0 - 36.0 g/dL    RDW 15.3 (H) 11.5 - 14.5 %    Platelets 213 150 - 450 K/uL    MPV 10.9 9.2 - 12.9 fL    Immature Granulocytes CANCELED 0.0 - 0.5 %    Immature Grans (Abs) CANCELED 0.00 - 0.04 K/uL    nRBC 0 0 /100 WBC    Gran % 66.0 38.0 - 73.0 %    Lymph % 31.0 18.0 - 48.0 %    Mono % 2.0 (L) 4.0 - 15.0 %    Eosinophil % 1.0 0.0 - 8.0 %    Basophil % 0.0 0.0 - 1.9 %    Differential Method Manual    Comprehensive metabolic panel    Collection Time: 02/26/25 12:48 PM   Result Value Ref Range    Sodium 136 136 - 145 mmol/L    Potassium 2.9 (L) 3.5 - 5.1 mmol/L    Chloride 100 95 - 110 mmol/L    CO2 25 23 - 29 mmol/L    Glucose 318 (H) 70 - 110 mg/dL    BUN 60 (H) 8 - 23 mg/dL    Creatinine 4.3 (H) 0.5 - 1.4 mg/dL    Calcium 8.7 8.7 - 10.5 mg/dL    Total Protein 5.5 (L) 6.0 - 8.4 g/dL    Albumin 1.9 (L) 3.5 - 5.2 g/dL     Total Bilirubin 0.5 0.1 - 1.0 mg/dL    Alkaline Phosphatase 114 40 - 150 U/L    AST 34 10 - 40 U/L    ALT 23 10 - 44 U/L    eGFR 14 (A) >60 mL/min/1.73 m^2    Anion Gap 11 8 - 16 mmol/L   Magnesium    Collection Time: 02/26/25 12:48 PM   Result Value Ref Range    Magnesium 1.9 1.6 - 2.6 mg/dL   EKG 12-lead    Collection Time: 02/26/25 12:50 PM   Result Value Ref Range    QRS Duration 152 ms    [facility] QTC Calculation 536 ms   POCT glucose    Collection Time: 02/26/25  5:40 PM   Result Value Ref Range    POCT Glucose 360 (H) 70 - 110 mg/dL   Urinalysis, Reflex to Urine Culture Urine, Clean Catch    Collection Time: 02/26/25  6:22 PM    Specimen: Urine, Clean Catch   Result Value Ref Range    Specimen UA Urine, Clean Catch     Color, UA Yellow Yellow, Straw, Nikki    Appearance, UA Clear Clear    pH, UA 7.0 5.0 - 8.0    Specific Gravity, UA 1.020 1.005 - 1.030    Protein, UA 3+ (A) Negative    Glucose, UA Trace (A) Negative    Ketones, UA Negative Negative    Bilirubin (UA) Negative Negative    Occult Blood UA 3+ (A) Negative    Nitrite, UA Positive (A) Negative    Urobilinogen, UA 1.0 <2.0 EU/dL    Leukocytes, UA Trace (A) Negative   Sodium, urine, random    Collection Time: 02/26/25  6:22 PM   Result Value Ref Range    Sodium, Urine 54 20 - 250 mmol/L   Creatinine, urine, random    Collection Time: 02/26/25  6:22 PM   Result Value Ref Range    Creatinine, Urine 86.5 23.0 - 375.0 mg/dL   Urinalysis Microscopic    Collection Time: 02/26/25  6:22 PM   Result Value Ref Range    RBC, UA >100 (H) 0 - 4 /hpf    WBC, UA 10 (H) 0 - 5 /hpf    Bacteria Moderate (A) None-Occ /hpf    Hyaline Casts, UA 0 0-1/lpf /lpf    Microscopic Comment SEE COMMENT    POCT glucose    Collection Time: 02/26/25  8:10 PM   Result Value Ref Range    POCT Glucose 363 (H) 70 - 110 mg/dL   Tacrolimus level    Collection Time: 02/27/25  5:30 AM   Result Value Ref Range    Tacrolimus Lvl 3.4 (L) 5.0 - 15.0 ng/mL   CBC Auto Differential    Collection Time:  02/27/25  5:30 AM   Result Value Ref Range    WBC 3.28 (L) 3.90 - 12.70 K/uL    RBC 2.91 (L) 4.60 - 6.20 M/uL    Hemoglobin 7.0 (L) 14.0 - 18.0 g/dL    Hematocrit 23.9 (L) 40.0 - 54.0 %    MCV 82 82 - 98 fL    MCH 24.1 (L) 27.0 - 31.0 pg    MCHC 29.3 (L) 32.0 - 36.0 g/dL    RDW 15.0 (H) 11.5 - 14.5 %    Platelets 194 150 - 450 K/uL    MPV 10.4 9.2 - 12.9 fL    Immature Granulocytes CANCELED 0.0 - 0.5 %    Immature Grans (Abs) CANCELED 0.00 - 0.04 K/uL    Lymph # CANCELED 1.0 - 4.8 K/uL    Mono # CANCELED 0.3 - 1.0 K/uL    Eos # CANCELED 0.0 - 0.5 K/uL    Baso # CANCELED 0.00 - 0.20 K/uL    nRBC 0 0 /100 WBC    Gran % 58.0 38.0 - 73.0 %    Lymph % 31.0 18.0 - 48.0 %    Mono % 6.0 4.0 - 15.0 %    Eosinophil % 4.0 0.0 - 8.0 %    Basophil % 1.0 0.0 - 1.9 %    Platelet Estimate Appears normal     Ovalocytes Occasional     Differential Method Manual    Basic Metabolic Panel    Collection Time: 02/27/25  5:30 AM   Result Value Ref Range    Sodium 139 136 - 145 mmol/L    Potassium 3.2 (L) 3.5 - 5.1 mmol/L    Chloride 107 95 - 110 mmol/L    CO2 23 23 - 29 mmol/L    Glucose 187 (H) 70 - 110 mg/dL    BUN 59 (H) 8 - 23 mg/dL    Creatinine 4.2 (H) 0.5 - 1.4 mg/dL    Calcium 7.9 (L) 8.7 - 10.5 mg/dL    Anion Gap 9 8 - 16 mmol/L    eGFR 14 (A) >60 mL/min/1.73 m^2   POCT glucose    Collection Time: 02/27/25  6:41 AM   Result Value Ref Range    POCT Glucose 169 (H) 70 - 110 mg/dL   POCT glucose    Collection Time: 02/27/25 11:26 AM   Result Value Ref Range    POCT Glucose 178 (H) 70 - 110 mg/dL   POCT glucose    Collection Time: 02/27/25  3:56 PM   Result Value Ref Range    POCT Glucose 273 (H) 70 - 110 mg/dL   POCT glucose    Collection Time: 02/27/25 10:52 PM   Result Value Ref Range    POCT Glucose 258 (H) 70 - 110 mg/dL   POCT glucose    Collection Time: 02/28/25  6:24 AM   Result Value Ref Range    POCT Glucose 166 (H) 70 - 110 mg/dL   CBC Auto Differential    Collection Time: 02/28/25  6:25 AM   Result Value Ref Range    WBC  3.78 (L) 3.90 - 12.70 K/uL    RBC 3.07 (L) 4.60 - 6.20 M/uL    Hemoglobin 7.4 (L) 14.0 - 18.0 g/dL    Hematocrit 25.6 (L) 40.0 - 54.0 %    MCV 83 82 - 98 fL    MCH 24.1 (L) 27.0 - 31.0 pg    MCHC 28.9 (L) 32.0 - 36.0 g/dL    RDW 15.1 (H) 11.5 - 14.5 %    Platelets 195 150 - 450 K/uL    MPV 10.5 9.2 - 12.9 fL    Immature Granulocytes CANCELED 0.0 - 0.5 %    Immature Grans (Abs) CANCELED 0.00 - 0.04 K/uL    nRBC 0 0 /100 WBC    Gran % 69.0 38.0 - 73.0 %    Lymph % 22.0 18.0 - 48.0 %    Mono % 6.0 4.0 - 15.0 %    Eosinophil % 1.0 0.0 - 8.0 %    Basophil % 0.0 0.0 - 1.9 %    Bands 1.0 %    Myelocytes 1.0 %    Platelet Estimate Appears normal     Poik Slight     Ovalocytes Occasional     Differential Method Manual    Basic Metabolic Panel    Collection Time: 02/28/25  6:25 AM   Result Value Ref Range    Sodium 142 136 - 145 mmol/L    Potassium 3.5 3.5 - 5.1 mmol/L    Chloride 110 95 - 110 mmol/L    CO2 20 (L) 23 - 29 mmol/L    Glucose 163 (H) 70 - 110 mg/dL    BUN 50 (H) 8 - 23 mg/dL    Creatinine 3.6 (H) 0.5 - 1.4 mg/dL    Calcium 8.6 (L) 8.7 - 10.5 mg/dL    Anion Gap 12 8 - 16 mmol/L    eGFR 17 (A) >60 mL/min/1.73 m^2   Phosphorus    Collection Time: 02/28/25  6:25 AM   Result Value Ref Range    Phosphorus 3.0 2.7 - 4.5 mg/dL   Magnesium    Collection Time: 02/28/25  6:25 AM   Result Value Ref Range    Magnesium 1.9 1.6 - 2.6 mg/dL   POCT glucose    Collection Time: 02/28/25 12:21 PM   Result Value Ref Range    POCT Glucose 140 (H) 70 - 110 mg/dL   POCT glucose    Collection Time: 02/28/25  4:35 PM   Result Value Ref Range    POCT Glucose 186 (H) 70 - 110 mg/dL   POCT glucose    Collection Time: 02/28/25  8:21 PM   Result Value Ref Range    POCT Glucose 224 (H) 70 - 110 mg/dL   CBC Auto Differential    Collection Time: 03/01/25  4:41 AM   Result Value Ref Range    WBC 3.73 (L) 3.90 - 12.70 K/uL    RBC 3.07 (L) 4.60 - 6.20 M/uL    Hemoglobin 7.3 (L) 14.0 - 18.0 g/dL    Hematocrit 25.9 (L) 40.0 - 54.0 %    MCV 84 82 - 98  fL    MCH 23.8 (L) 27.0 - 31.0 pg    MCHC 28.2 (L) 32.0 - 36.0 g/dL    RDW 15.0 (H) 11.5 - 14.5 %    Platelets 195 150 - 450 K/uL    MPV 11.1 9.2 - 12.9 fL    Immature Granulocytes CANCELED 0.0 - 0.5 %    Immature Grans (Abs) CANCELED 0.00 - 0.04 K/uL    nRBC 0 0 /100 WBC    Gran % 67.0 38.0 - 73.0 %    Lymph % 14.0 (L) 18.0 - 48.0 %    Mono % 17.0 (H) 4.0 - 15.0 %    Eosinophil % 1.0 0.0 - 8.0 %    Basophil % 1.0 0.0 - 1.9 %    Platelet Estimate Appears normal     Ovalocytes Occasional     Spherocytes Occasional     Schistocytes Present     Differential Method Manual    Basic Metabolic Panel    Collection Time: 03/01/25  4:41 AM   Result Value Ref Range    Sodium 142 136 - 145 mmol/L    Potassium 3.3 (L) 3.5 - 5.1 mmol/L    Chloride 111 (H) 95 - 110 mmol/L    CO2 20 (L) 23 - 29 mmol/L    Glucose 92 70 - 110 mg/dL    BUN 43 (H) 8 - 23 mg/dL    Creatinine 2.6 (H) 0.5 - 1.4 mg/dL    Calcium 9.0 8.7 - 10.5 mg/dL    Anion Gap 11 8 - 16 mmol/L    eGFR 26 (A) >60 mL/min/1.73 m^2   POCT glucose    Collection Time: 03/01/25  5:08 AM   Result Value Ref Range    POCT Glucose 98 70 - 110 mg/dL   POCT glucose    Collection Time: 03/01/25 11:41 AM   Result Value Ref Range    POCT Glucose 90 70 - 110 mg/dL   Type & Screen    Collection Time: 03/01/25  1:21 PM   Result Value Ref Range    Group & Rh B POS     Indirect Jimmy NEG     Specimen Outdate 03/04/2025 23:59    Prepare RBC 1 Unit    Collection Time: 03/01/25  1:21 PM   Result Value Ref Range    UNIT NUMBER [id number]     Product Code Y7190S17     DISPENSE STATUS TRANSFUSED     CODING SYSTEM AHKA388     Unit Blood Type Code 7300     Unit Blood Type B POS     Unit Expiration [id number]     CROSSMATCH INTERPRETATION Compatible    POCT glucose    Collection Time: 03/01/25  5:57 PM   Result Value Ref Range    POCT Glucose 221 (H) 70 - 110 mg/dL   POCT glucose    Collection Time: 03/01/25  8:49 PM   Result Value Ref Range    POCT Glucose 256 (H) 70 - 110 mg/dL   POCT glucose     Collection Time: 03/02/25  5:54 AM   Result Value Ref Range    POCT Glucose 222 (H) 70 - 110 mg/dL   Tacrolimus Level    Collection Time: 03/02/25  5:55 AM   Result Value Ref Range    Tacrolimus Lvl 2.9 (L) 5.0 - 15.0 ng/mL   Comprehensive metabolic panel    Collection Time: 03/02/25  5:55 AM   Result Value Ref Range    Sodium 142 136 - 145 mmol/L    Potassium 3.6 3.5 - 5.1 mmol/L    Chloride 111 (H) 95 - 110 mmol/L    CO2 21 (L) 23 - 29 mmol/L    Glucose 215 (H) 70 - 110 mg/dL    BUN 36 (H) 8 - 23 mg/dL    Creatinine 2.2 (H) 0.5 - 1.4 mg/dL    Calcium 8.8 8.7 - 10.5 mg/dL    Total Protein 4.9 (L) 6.0 - 8.4 g/dL    Albumin 1.6 (L) 3.5 - 5.2 g/dL    Total Bilirubin 0.4 0.1 - 1.0 mg/dL    Alkaline Phosphatase 104 40 - 150 U/L    AST 17 10 - 40 U/L    ALT 23 10 - 44 U/L    eGFR 31 (A) >60 mL/min/1.73 m^2    Anion Gap 10 8 - 16 mmol/L   POCT glucose    Collection Time: 03/02/25 11:58 AM   Result Value Ref Range    POCT Glucose 217 (H) 70 - 110 mg/dL   Hemoglobin    Collection Time: 03/02/25  3:07 PM   Result Value Ref Range    Hemoglobin 8.8 (L) 14.0 - 18.0 g/dL   Hematocrit    Collection Time: 03/02/25  3:07 PM   Result Value Ref Range    Hematocrit 30.3 (L) 40.0 - 54.0 %   POCT glucose    Collection Time: 03/02/25  4:15 PM   Result Value Ref Range    POCT Glucose 225 (H) 70 - 110 mg/dL   POCT glucose    Collection Time: 03/02/25  9:40 PM   Result Value Ref Range    POCT Glucose 235 (H) 70 - 110 mg/dL   POCT glucose    Collection Time: 03/03/25  4:44 AM   Result Value Ref Range    POCT Glucose 192 (H) 70 - 110 mg/dL   CBC auto differential    Collection Time: 03/03/25  6:50 AM   Result Value Ref Range    WBC 2.81 (L) 3.90 - 12.70 K/uL    RBC 3.15 (L) 4.60 - 6.20 M/uL    Hemoglobin 7.6 (L) 14.0 - 18.0 g/dL    Hematocrit 25.7 (L) 40.0 - 54.0 %    MCV 82 82 - 98 fL    MCH 24.1 (L) 27.0 - 31.0 pg    MCHC 29.6 (L) 32.0 - 36.0 g/dL    RDW 15.3 (H) 11.5 - 14.5 %    Platelets 212 150 - 450 K/uL    MPV 11.2 9.2 - 12.9 fL     Immature Granulocytes CANCELED 0.0 - 0.5 %    Immature Grans (Abs) CANCELED 0.00 - 0.04 K/uL    nRBC 0 0 /100 WBC    Gran % 69.0 38.0 - 73.0 %    Lymph % 18.0 18.0 - 48.0 %    Mono % 10.0 4.0 - 15.0 %    Eosinophil % 2.0 0.0 - 8.0 %    Basophil % 0.0 0.0 - 1.9 %    Metamyelocytes 1.0 %    Platelet Estimate Appears normal     Poik Slight     Ovalocytes Occasional     Large/Giant Platelets Present     Differential Method Manual      *Note: Due to a large number of results and/or encounters for the requested time period, some results have not been displayed. A complete set of results can be found in Results Review.       Imaging Results:  Imaging Results              X-Ray Chest 1 View (Final result)  Result time 02/26/25 13:46:25      Final result by Joe Leach MD (02/26/25 13:46:25)                   Impression:      1.  Negative for acute process involving the chest, considering there are chronically low lung volumes.    2.  Stable findings as noted above.      Electronically signed by: Joe Leach MD  Date:    02/26/2025  Time:    13:46               Narrative:    EXAMINATION:  XR CHEST 1 VIEW    CLINICAL HISTORY:  Dyspnea, unspecified    COMPARISON:  January 25, 2025, as well as studies dating back to February 10, 2023.    FINDINGS:  EKG leads overlie the chest.  Chronically low lung volumes.  The lungs are free of new pulmonary opacities.  The cardiac silhouette size is enlarged.  The trachea is midline and the mediastinal width is normal. Negative for focal infiltrate, effusion or pneumothorax. Pulmonary vasculature is normal. Negative for osseous abnormalities. Tortuous aorta with calcifications of the aortic knob.  There are degenerative changes of the spine and both shoulder girdles.  Right axillary region vascular stent again seen.                                       The EKG was ordered, reviewed, and independently interpreted by the ED provider.  Interpretation time: 12:50 PM  Rate: 101  BPM  Rhythm: sinus tachycardia with premature atrial complexes  Interpretation: Nonspecific intraventricular block. Possible Lateral infarct, age undetermined. No STEMI.      The Emergency Provider reviewed the vital signs and test results, which are outlined above.     ED Discussion     1:53 PM: Discussed case with FABRICIO Neil (Hospital Medicine). Dr. Payan agrees with current care and management of pt and accepts admission.   Admitting Service: Hospital Medicine  Admitting Physician: Dr. Payan  Admit to: Med/Tele Inpatient    1:58 PM: Re-evaluated pt.  I have discussed test results, shared treatment plan, and the need for admission with patient/family/caretaker at bedside. Pt and/or family/caretaker express understanding at this time and agree with all information. All questions answered. Pt/caretaker/family member(s) have no further questions or concerns at this time. Pt is ready for admit.         1:45 PM: Discussed pt's case with Hany Gonsalez MD (Nephrology) who recommends  admittance for further workup. Dr. Gonsalez reccomends to order post-void residual, PSA, gan catheter, u/a, urine culture, and to replace K. Dr. ramirez reccomends to repeat labs.            Medical Decision Making  69-year-old Old black male with a history of previous kidney transplant.  Patient presented with abnormal renal functions.  Patient otherwise appeared comfortable.  Discussed case with hospital medicine and with Nephrology.  Patient to be admitted to Hospital Medicine for further treatment.    Amount and/or Complexity of Data Reviewed  Labs: ordered. Decision-making details documented in ED Course.  Radiology: ordered. Decision-making details documented in ED Course.  ECG/medicine tests: ordered and independent interpretation performed. Decision-making details documented in ED Course.    Risk  Prescription drug management.  Decision regarding hospitalization.                ED Medication(s):  Medications   amitriptyline tablet  25 mg (has no administration in time range)   calcitRIOL capsule 0.25 mcg (0.25 mcg Oral Given 3/3/25 0945)   ferrous sulfate tablet 1 each (1 each Oral Given 3/3/25 0945)   metoprolol succinate (TOPROL-XL) 24 hr tablet 25 mg (25 mg Oral Given 3/3/25 0945)   predniSONE tablet 5 mg (5 mg Oral Given 3/3/25 0945)   tacrolimus capsule 4 mg (4 mg Oral Given 3/3/25 0809)     And   tacrolimus capsule 3 mg (3 mg Oral Given 3/2/25 1820)   acetaminophen tablet 650 mg (has no administration in time range)   ondansetron disintegrating tablet 4 mg (has no administration in time range)   hydrALAZINE injection 10 mg (has no administration in time range)   glucose chewable tablet 16 g (has no administration in time range)   glucose chewable tablet 24 g (has no administration in time range)   dextrose 50% injection 12.5 g (has no administration in time range)   dextrose 50% injection 25 g (has no administration in time range)   glucagon (human recombinant) injection 1 mg (has no administration in time range)   insulin aspart U-100 pen 0-10 Units (2 Units Subcutaneous Given 3/3/25 0625)   insulin glargine U-100 (Lantus) pen 30 Units (30 Units Subcutaneous Given 3/2/25 2149)   mycophenolate tablet 500 mg (500 mg Oral Given 3/3/25 0945)   dextromethorphan-guaiFENesin  mg/5 ml liquid 5 mL (5 mLs Oral Given 3/2/25 2153)   cefTRIAXone injection 1 g (1 g Intravenous Given 3/2/25 1315)   mupirocin 2 % ointment ( Nasal Given 3/3/25 0946)   0.9%  NaCl infusion (for blood administration) (has no administration in time range)   HYDROcodone-acetaminophen 5-325 mg per tablet 1 tablet (has no administration in time range)   sodium chloride 0.9% bolus 500 mL 500 mL (0 mLs Intravenous Stopped 2/26/25 1437)   potassium chloride SA CR tablet 20 mEq (20 mEq Oral Given 2/26/25 1609)   potassium chloride SA CR tablet 40 mEq (40 mEq Oral Given 2/27/25 1127)   potassium chloride SA CR tablet 40 mEq (40 mEq Oral Given 3/1/25 1520)   bumetanide  injection 1 mg (1 mg Intravenous Given 3/1/25 1855)   albuterol-ipratropium 2.5 mg-0.5 mg/3 mL nebulizer solution 3 mL (3 mLs Nebulization Given 3/2/25 0900)   phenazopyridine tablet 100 mg (100 mg Oral Given 3/2/25 2153)       Current Discharge Medication List                  Scribe Attestation:   Scribe #1: I performed the above scribed service and the documentation accurately describes the services I performed. I attest to the accuracy of the note.     Attending:   Physician Attestation Statement for Scribe #1: I, Arturo Dukes MD, personally performed the services described in this documentation, as scribed by Anthony Urbina, in my presence, and it is both accurate and complete.           Clinical Impression       ICD-10-CM ICD-9-CM   1. Kidney transplanted  Z94.0 V42.0   2. Dyspnea  R06.00 786.09   3. Iron deficiency anemia due to chronic blood loss  D50.0 280.0   4. ANGELITO (acute kidney injury)  N17.9 584.9       Disposition:   Disposition: Admitted  Condition: Fair        Arturo Dukes MD  03/03/25 1001

## 2025-02-26 NOTE — ASSESSMENT & PLAN NOTE
Patient has Combined Systolic and Diastolic heart failure that is Chronic. On presentation their CHF was well compensated. Most recent BNP and echo results are listed below.  Recent Labs     02/26/25  1248   *     Latest ECHO  Results for orders placed during the hospital encounter of 11/26/24    Echo    Interpretation Summary    Left Ventricle: The left ventricle is normal in size. Normal wall thickness. There is concentric hypertrophy. Normal wall motion. Septal motion is consistent with bundle branch block. There is moderately reduced systolic function. Ejection fraction is approximately 35%. Grade I diastolic dysfunction.    Right Ventricle: Normal right ventricular cavity size. Wall thickness is normal. Systolic function is normal.    IVC/SVC: Normal venous pressure at 3 mmHg.    Current Heart Failure Medications  metoprolol succinate (TOPROL-XL) 24 hr tablet 25 mg, Daily, Oral  hydrALAZINE injection 10 mg, Every 6 hours PRN, Intravenous    Plan  - Monitor strict I&Os and daily weights.    - Place on telemetry  - Low sodium diet  - Place on fluid restriction of 1.5 L.   - Cardiology has not been consulted  - The patient's volume status is at their baseline  - patient appears euvolemic

## 2025-02-26 NOTE — ASSESSMENT & PLAN NOTE
Ongoing SW/CM Assessment/Plan of Care Note     See SW/CM flowsheets for goals and other objective data.    Progress note:   Consult to  for Eliquis.   PA needed. Pharm can use 1x coupon for first month. MD made aware & PA form sent for MD to complete via PS.          "Patient's FSGs are controlled on current medication regimen.  Last A1c reviewed-   Lab Results   Component Value Date    HGBA1C 5.7 (H) 12/17/2024     Most recent fingerstick glucose reviewed- No results for input(s): "POCTGLUCOSE" in the last 24 hours.  Current correctional scale  Medium  Maintain anti-hyperglycemic dose as follows-   Antihyperglycemics (From admission, onward)      Start     Stop Route Frequency Ordered    02/26/25 2100  insulin glargine U-100 (Lantus) pen 30 Units         -- SubQ Nightly 02/26/25 1451    02/26/25 1551  insulin aspart U-100 pen 0-10 Units         -- SubQ Before meals & nightly PRN 02/26/25 1451          Hold Oral hypoglycemics while patient is in the hospital.  "

## 2025-02-26 NOTE — PLAN OF CARE
[facility] - Emergency Dept.  Initial Discharge Assessment       Primary Care Provider: Valery Caal MD    Admission Diagnosis: Dyspnea [R06.00]    Admission Date: 2/26/2025  Expected Discharge Date:     Transition of Care Barriers: (P) None    Payor: BLUE CROSS BLUE SHIELD / Plan: [place] PPO / Product Type: PPO /     Extended Emergency Contact Information  Primary Emergency Contact: Edward Handley   [place]  Mobile Phone: [phone]  Relation: Daughter  Secondary Emergency Contact: Marybeth Handley  Address: [address], [place] [id number] [place] [address]  Home Phone: [phone]  Relation: Daughter              [facility]  [address]  [facility] [address]  Phone: [phone] Fax: [phone]      Initial Assessment (most recent)       Adult Discharge Assessment - 02/26/25 1435          Discharge Assessment    Assessment Type Discharge Planning Assessment     Confirmed/corrected address, phone number and insurance Yes     Confirmed Demographics Correct on Facesheet     Source of Information family     Does patient/caregiver understand observation status Yes (P)      Communicated GRETEL with patient/caregiver No (P)      Reason For Admission Dyspnea (P)      People in Home alone (P)      Facility Arrived From: Home (P)      Do you expect to return to your current living situation? Yes (P)      Do you have help at home or someone to help you manage your care at home? No (P)      Prior to hospitilization cognitive status: Alert/Oriented (P)      Current cognitive status: Alert/Oriented (P)      Walking or Climbing Stairs Difficulty no (P)      Dressing/Bathing Difficulty no (P)      Home Accessibility wheelchair accessible (P)      Home Layout Able to live on 1st floor (P)      Equipment Currently Used at Home none (P)      Readmission within 30 days? Yes (P)      Patient currently being followed by outpatient case management? No  (P)      Do you currently have service(s) that help you manage your care at home? No (P)      Do you take prescription medications? Yes (P)      Do you have prescription coverage? Yes (P)      [facility]      Do you have any problems affording any of your prescribed medications? No (P)      Is the patient taking medications as prescribed? yes (P)      Who is going to help you get home at discharge? Daughters (P)      How do you get to doctors appointments? family or friend will provide (P)      Are you on dialysis? No (P)      Do you take coumadin? No (P)      DME Needed Upon Discharge  none (P)      Discharge Plan discussed with: Patient (P)      Transition of Care Barriers None (P)         Physical Activity    On average, how many days per week do you engage in moderate to strenuous exercise (like a brisk walk)? 0 days (P)      On average, how many minutes do you engage in exercise at this level? 0 min (P)         Financial Resource Strain    How hard is it for you to pay for the very basics like food, housing, medical care, and heating? Not hard at all (P)         Housing Stability    In the last 12 months, was there a time when you were not able to pay the mortgage or rent on time? No (P)      At any time in the past 12 months, were you homeless or living in a shelter (including now)? No (P)         Transportation Needs    Has the lack of transportation kept you from medical appointments, meetings, work or from getting things needed for daily living? No (P)         Food Insecurity    Within the past 12 months, you worried that your food would run out before you got the money to buy more. Never true (P)      Within the past 12 months, the food you bought just didn't last and you didn't have money to get more. Never true (P)         Stress    Do you feel stress - tense, restless, nervous, or anxious, or unable to sleep at night because your mind is troubled all the time -  these days? Not at all (P)         Social Isolation    How often do you feel lonely or isolated from those around you?  Never (P)         Alcohol Use    Q1: How often do you have a drink containing alcohol? Never (P)      Q2: How many drinks containing alcohol do you have on a typical day when you are drinking? Patient does not drink (P)      Q3: How often do you have six or more drinks on one occasion? Never (P)         Utilities    In the past 12 months has the electric, gas, oil, or water company threatened to shut off services in your home? No (P)         Health Literacy    How often do you need to have someone help you when you read instructions, pamphlets, or other written material from your doctor or pharmacy? Never (P)

## 2025-02-26 NOTE — CONSULTS
Nephrology Consultation  Consulting Physician:  Dr Payan  Reason for consultation:  ANGELITO on CKD , renal transplant status  Informants: pt, family,      History of Present Illness     Mitch Whittaker   is a 69 y.o. 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  Patient has a routine nephrology follow up visit today and part of his appointment he had lab work done that revealed acute on chronic renal failure, patient was advised to come to the emergency room for further evaluation.  According to the family patient has been in the [facility] rehab facility for some lower extremity weakness.  Admission labs revealed a BUN of 60 and a serum creatinine of 4.3 mg/dL.  Patient denies any pain in his abdomen or tenderness over his allograft.  He has a Ann catheter was placed in the ER that has bloody urine.  Blood pressure was slightly low on presentation which improved in the ER after fluid bolus.  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 RNA NEGATIVE 5/2015, 2021, 2022    History of colon polyps     HPTH (hyperparathyroidism)     Hyperlipidemia     Hypertension associated with stage 3 chronic kidney disease due to type 2 diabetes mellitus     LBBB (left bundle branch block) 12/20/2021    Morbid obesity with BMI of 45.0-49.9, adult     Nephrolithiasis 6/7/2013    PCO (posterior capsular opacification), left 03/04/2019    Proteinuria     resolved s/p transplant    S/P kidney transplant     Sleep apnea     Type 2 diabetes, uncontrolled, with retinopathy     Type II diabetes mellitus with renal manifestations          PSHx:  Past Surgical History:   Procedure Laterality Date    CARDIAC CATHETERIZATION  01/01/2008    normal coronary    CARPAL TUNNEL RELEASE Right 12/01/2023    Procedure: RELEASE, CARPAL TUNNEL;  Surgeon: Noel Almonte MD;  Location: [facility] OR;  Service: Orthopedics;  Laterality: Right;    CARPAL TUNNEL RELEASE Left 7/18/2024    Procedure: RELEASE, CARPAL TUNNEL;  Surgeon: Noel Almonte MD;  Location: [facility] OR;  Service: Orthopedics;  Laterality: Left;    COLONOSCOPY N/A 04/05/2018    Procedure: COLONOSCOPY;  Surgeon: Chava Ronquillo MD;  Location[facility];  Service: Endoscopy;  Laterality: N/A;    COLONOSCOPY N/A 05/02/2022    Procedure: COLONOSCOPY;  Surgeon: Alix Puente MD;  Location: [facility];  Service: Endoscopy;  Laterality: N/A;    COLONOSCOPY N/A 06/07/2023    Procedure: COLONOSCOPY - rule out CMV  Cardiac clearance/Eliquis hold approval received on 05/21/23 per Dr. Meade, cardiology.  Note in encounters.  LB;  Surgeon: Daniella Shah MD;  Location: [facility];  Service: Endoscopy;  Laterality: N/A;    ESOPHAGOGASTRODUODENOSCOPY N/A 03/26/2024    Procedure: EGD (ESOPHAGOGASTRODUODENOSCOPY) 3/1-pt cleared, ok to hold eliquis for 3 days;  Surgeon: Daniella Shah MD;   Location: [facility];  Service: Endoscopy;  Laterality: N/A;    KIDNEY TRANSPLANT  2015    RETINAL LASER PROCEDURE           SocHx:    Social History[1]      FamHx:    Family History   Problem Relation Name Age of Onset    Diabetes Mother      Hypertension Mother      Heart failure Mother      Heart failure Father      Kidney disease Sister          ESRD    Diabetes Sister      Diabetes Maternal Grandmother      Cancer Neg Hx           ROS:    Review of Systems   Constitutional:  Positive for malaise/fatigue. Negative for chills, fever and weight loss.   HENT:  Negative for ear pain, hearing loss and tinnitus.    Eyes:  Negative for blurred vision and double vision.   Respiratory:  Negative for cough and hemoptysis.    Cardiovascular:  Negative for chest pain, palpitations and orthopnea.   Gastrointestinal:  Negative for heartburn, nausea and vomiting.   Genitourinary:  Negative for dysuria, frequency and urgency.   Musculoskeletal:  Negative for myalgias.   Neurological:  Negative for dizziness and headaches.   Psychiatric/Behavioral:  Negative for depression and suicidal ideas.         Allergies:    is allergic to lisinopril, actos  [pioglitazone], and metformin.    Current medications:   Scheduled Meds:   calcitRIOL  0.25 mcg Oral Daily    ferrous sulfate  1 tablet Oral Daily    insulin glargine U-100  30 Units Subcutaneous QHS    [START ON 2/27/2025] metoprolol succinate  25 mg Oral Daily    mycophenolate  500 mg Oral BID    [START ON 2/27/2025] predniSONE  5 mg Oral Daily    [START ON 2/27/2025] tacrolimus  4 mg Oral Daily AM    And    tacrolimus  3 mg Oral Daily PM     Continuous Infusions:   0.9% NaCl   Intravenous Continuous         PRN Meds:.  Current Facility-Administered Medications:     acetaminophen, 650 mg, Oral, Q6H PRN    amitriptyline, 25 mg, Oral, Nightly PRN    dextrose 50%, 12.5 g, Intravenous, PRN    dextrose 50%, 25 g, Intravenous, PRN    glucagon (human recombinant), 1 mg, Intramuscular, PRN     glucose, 16 g, Oral, PRN    glucose, 24 g, Oral, PRN    hydrALAZINE, 10 mg, Intravenous, Q6H PRN    HYDROcodone-acetaminophen, 1 tablet, Oral, Q6H PRN    insulin aspart U-100, 0-10 Units, Subcutaneous, QID (AC + HS) PRN    ondansetron, 4 mg, Oral, Q6H PRN       Physical Examination    VS/Measurements   /62 (BP Location: Left arm, Patient Position: Lying)   Pulse 86   Temp 98.3 °F (36.8 °C) (Oral)   Resp 18   Wt 120.7 kg (266 lb 1.5 oz)   SpO2 98%   BMI 41.68 kg/m²     Physical Exam  Constitutional:       General: He is not in acute distress.     Appearance: He is obese. He is not ill-appearing.   HENT:      Head: Normocephalic and atraumatic.   Cardiovascular:      Rate and Rhythm: Normal rate.      Heart sounds: No murmur heard.     No friction rub.   Pulmonary:      Breath sounds: No wheezing, rhonchi or rales.   Abdominal:      General: There is no distension.      Palpations: There is no mass.      Tenderness: There is no abdominal tenderness. There is no guarding.   Genitourinary:     Comments: Ann catheter in place with bloody urine  Musculoskeletal:         General: No swelling or tenderness.      Cervical back: Neck supple.   Skin:     Coloration: Skin is not jaundiced.      Findings: No lesion.   Neurological:      Motor: No weakness.      Gait: Gait normal.              Laboratory Results   Today's Lab Results :    Recent Results (from the past 24 hours)   Brain natriuretic peptide    Collection Time: 02/26/25 12:48 PM   Result Value Ref Range     (H) 0 - 99 pg/mL   CBC auto differential    Collection Time: 02/26/25 12:48 PM   Result Value Ref Range    WBC 3.31 (L) 3.90 - 12.70 K/uL    RBC 3.30 (L) 4.60 - 6.20 M/uL    Hemoglobin 8.0 (L) 14.0 - 18.0 g/dL    Hematocrit 27.1 (L) 40.0 - 54.0 %    MCV 82 82 - 98 fL    MCH 24.2 (L) 27.0 - 31.0 pg    MCHC 29.5 (L) 32.0 - 36.0 g/dL    RDW 15.3 (H) 11.5 - 14.5 %    Platelets 213 150 - 450 K/uL    MPV 10.9 9.2 - 12.9 fL    Immature Granulocytes  CANCELED 0.0 - 0.5 %    Immature Grans (Abs) CANCELED 0.00 - 0.04 K/uL    nRBC 0 0 /100 WBC    Gran % 66.0 38.0 - 73.0 %    Lymph % 31.0 18.0 - 48.0 %    Mono % 2.0 (L) 4.0 - 15.0 %    Eosinophil % 1.0 0.0 - 8.0 %    Basophil % 0.0 0.0 - 1.9 %    Differential Method Manual    Comprehensive metabolic panel    Collection Time: 02/26/25 12:48 PM   Result Value Ref Range    Sodium 136 136 - 145 mmol/L    Potassium 2.9 (L) 3.5 - 5.1 mmol/L    Chloride 100 95 - 110 mmol/L    CO2 25 23 - 29 mmol/L    Glucose 318 (H) 70 - 110 mg/dL    BUN 60 (H) 8 - 23 mg/dL    Creatinine 4.3 (H) 0.5 - 1.4 mg/dL    Calcium 8.7 8.7 - 10.5 mg/dL    Total Protein 5.5 (L) 6.0 - 8.4 g/dL    Albumin 1.9 (L) 3.5 - 5.2 g/dL    Total Bilirubin 0.5 0.1 - 1.0 mg/dL    Alkaline Phosphatase 114 40 - 150 U/L    AST 34 10 - 40 U/L    ALT 23 10 - 44 U/L    eGFR 14 (A) >60 mL/min/1.73 m^2    Anion Gap 11 8 - 16 mmol/L   Magnesium    Collection Time: 02/26/25 12:48 PM   Result Value Ref Range    Magnesium 1.9 1.6 - 2.6 mg/dL        Assessment and Plan     ANGELITO on CKD 3  - baseline serum creatinine about 1.5 mg/dL, admission creatinine 4.3,  - Ann catheter with bloody urine noted.  Can be due to traumatic Ann placement.  Need to rule out acute rejection.  Not sure patient was receiving all his immunosuppressants at [facility] [place].  Need to check into this.  -  will start him on IV fluids,    S/p LRDKT in 2015, resume immunosuppression with Prograf 4/3, check Prograf level, target Prograf level 4-7, prednisone 5 mg daily and CellCept 500 mg twice a day.  -  check a Doppler and ultrasound of kidney transplant.  - if no improvement in renal function most likely will need a transplant kidney biopsy.    Hypertension.  Acceptable blood pressure control.  Monitor.    Hypokalemia.  Received potassium supplements    HFrEF.  Last echo reports LV ejection fraction about 35%.  He also has grade 1 diastolic function.  Continue gentle hydration in view of acute kidney  injury with a close monitoring of fluid status.    Anemia.  Likely multifactorial.  Monitor hemoglobin.  PRBC transfusion indicated    Plan was discussed with patient, family at the bedside, primary          ________________________________________________  Berlin Pacheco           [1]   Social History  Socioeconomic History    Marital status: Legally     Number of children: 2   Occupational History    Occupation: retired     Employer: Retired   Tobacco Use    Smoking status: Former     Current packs/day: 0.00     Types: Cigarettes     Quit date: 2013     Years since quittin.7     Passive exposure: Past    Smokeless tobacco: Former     Quit date: 2013    Tobacco comments:     used marijuana since [id number], stopped after started dialysis   Substance and Sexual Activity    Alcohol use: No     Alcohol/week: 0.0 standard drinks of alcohol    Drug use: Not Currently     Comment:      Sexual activity: Never   Social History Narrative    . Lives with spouse. Has 2 children. Patient retired as  for [employer]. He has been washing cars.     Social Drivers of Health     Financial Resource Strain: Low Risk  (2025)    Overall Financial Resource Strain (CARDIA)     Difficulty of Paying Living Expenses: Not hard at all   Food Insecurity: No Food Insecurity (2025)    Hunger Vital Sign     Worried About Running Out of Food in the Last Year: Never true     Ran Out of Food in the Last Year: Never true   Transportation Needs: No Transportation Needs (2025)    Received from [facility]     Lack of Transportation (Medical): No     Lack of Transportation (Non-Medical): No   Physical Activity: Inactive (2025)    Exercise Vital Sign     Days of Exercise per Week: 0 days     Minutes of Exercise per Session: 0 min   Stress: No Stress Concern Present (2025)    [employer] [place] - Occupational  Stress Questionnaire     Feeling of Stress : Not at all   Housing Stability: Low Risk  (2/26/2025)    Housing Stability Vital Sign     Unable to Pay for Housing in the Last Year: No     Homeless in the Last Year: No

## 2025-02-26 NOTE — H&P
[facility] - Emergency Dept.  [facility]  History & Physical    Patient Name: Mitch Whittaker  MRN: [id number]  Patient Class: IP- Inpatient  Admission Date: 2/26/2025  Attending Physician: Long Payan MD  Primary Care Provider: Valery Caal MD         Patient information was obtained from patient and ER records.     Subjective:     Principal Problem:ANGELITO (acute kidney injury)    Chief Complaint:   Chief Complaint   Patient presents with    abnormal labs     [facility] reports [facility] sent the pt. Out for renal failure.         HPI: Patient is a 69-year-old  male with a PMH of CHF, kidney transplant, CAD, DM, DVT who presents to the ED with complaints of weakness.  Patient is a resident at [facility].  He states he was sent here for renal failure.  Patient does report still producing urine however it is decreased.  Does complain of some dysuria.  Reports that oral intake is good.  Denies any fever or chills.  No other issues reported at this time.    In the ED, H&H 8.0/27, K:  2.9, BUN/CR 60/4.3.  ED discussed with Dr. Gonsalez who recommended fluid challenge due to concern with over-diuresis.        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 RNA NEGATIVE 5/2015,  2021, 2022    History of colon polyps     HPTH (hyperparathyroidism)     Hyperlipidemia     Hypertension associated with stage 3 chronic kidney disease due to type 2 diabetes mellitus     LBBB (left bundle branch block) 12/20/2021    Morbid obesity with BMI of 45.0-49.9, adult     Nephrolithiasis 6/7/2013    PCO (posterior capsular opacification), left 03/04/2019    Proteinuria     resolved s/p transplant    S/P kidney transplant     Sleep apnea     Type 2 diabetes, uncontrolled, with retinopathy     Type II diabetes mellitus with renal manifestations        Past Surgical History:   Procedure Laterality Date    CARDIAC CATHETERIZATION  01/01/2008    normal coronary    CARPAL TUNNEL RELEASE Right 12/01/2023    Procedure: RELEASE, CARPAL TUNNEL;  Surgeon: Noel Almonte MD;  Location: [facility] OR;  Service: Orthopedics;  Laterality: Right;    CARPAL TUNNEL RELEASE Left 7/18/2024    Procedure: RELEASE, CARPAL TUNNEL;  Surgeon: Noel Almonte MD;  Location: [facility] OR;  Service: Orthopedics;  Laterality: Left;    COLONOSCOPY N/A 04/05/2018    Procedure: COLONOSCOPY;  Surgeon: Chava Ronquillo MD;  Location: [facility];  Service: Endoscopy;  Laterality: N/A;    COLONOSCOPY N/A 05/02/2022    Procedure: COLONOSCOPY;  Surgeon: Alix Puente MD;  Location: [facility];  Service: Endoscopy;  Laterality: N/A;    COLONOSCOPY N/A 06/07/2023    Procedure: COLONOSCOPY - rule out CMV  Cardiac clearance/Eliquis hold approval received on 05/21/23 per Dr. Meade, cardiology.  Note in encounters.  LB;  Surgeon: Daniella Shah MD;  Location: [facility];  Service: Endoscopy;  Laterality: N/A;    ESOPHAGOGASTRODUODENOSCOPY N/A 03/26/2024    Procedure: EGD (ESOPHAGOGASTRODUODENOSCOPY) 3/1-pt cleared, ok to hold eliquis for 3 days;  Surgeon: Daniella Shah MD;  Location: [facility];  Service: Endoscopy;  Laterality: N/A;    KIDNEY TRANSPLANT  2015    RETINAL LASER PROCEDURE         Review of patient's allergies  "indicates:   Allergen Reactions    Lisinopril Other (See Comments)     Other reaction(s):  cough    Actos  [pioglitazone] Other (See Comments)     Other reaction(s): CHF    Metformin Other (See Comments)     Other reaction(s): renal insuff  Other reaction(s): CHF       Current Facility-Administered Medications on File Prior to Encounter   Medication    acetaminophen tablet 650 mg     Current Outpatient Medications on File Prior to Encounter   Medication Sig    albuterol (PROVENTIL) 2.5 mg /3 mL (0.083 %) nebulizer solution Take 3 mLs (2.5 mg total) by nebulization every 4 to 6 hours as needed for Wheezing or Shortness of Breath.    albuterol (PROVENTIL/VENTOLIN HFA) 90 mcg/actuation inhaler Inhale 2 puffs into the lungs every 4 (four) hours as needed for Wheezing or Shortness of Breath.    amitriptyline (ELAVIL) 25 MG tablet Take 1 tablet (25 mg total) by mouth nightly as needed for Insomnia.    apixaban (ELIQUIS) 5 mg Tab Take 1 tablet (5 mg total) by mouth 2 (two) times daily.    aspirin (ECOTRIN) 81 MG EC tablet Take 1 tablet by mouth Daily.     BD ULTRA-FINE MINI PEN NEEDLE 31 gauge x 3/16" Ndle Use to inject insulin as needed up to 4 times daily    blood sugar diagnostic (CONTOUR NEXT TEST STRIPS) Strp Test blood sugar 4 (four) times daily before meals and nightly.    blood-glucose meter (FREESTYLE LITE METER) kit 1 each 4 (four) times daily.    blood-glucose sensor (DEXCOM G7 SENSOR) Alba Use as directed for continuous glucose monitoring; Change every 10 days.    calcitRIOL (ROCALTROL) 0.25 MCG Cap Take 1 capsule (0.25 mcg total) by mouth once daily.    famotidine (PEPCID) 20 MG tablet TAKE ONE TABLET BY MOUTH EVERY EVENING    ferrous sulfate (FEOSOL) 325 mg (65 mg iron) Tab tablet Take 1 tablet (325 mg total) by mouth daily with breakfast.    fish oil-omega-3 fatty acids 300-1,000 mg capsule Take 1 g by mouth once daily.    furosemide (LASIX) 40 MG tablet Take 1 tablet (40 mg total) by mouth once daily.    " HYDROcodone-acetaminophen (NORCO) 5-325 mg per tablet Take 1 tablet by mouth every 6 (six) hours as needed for Pain.    insulin glargine U-100, Lantus, (LANTUS SOLOSTAR U-100 INSULIN) 100 unit/mL (3 mL) InPn pen Inject 30 Units into the skin 2 (two) times daily.    ketoconazole (NIZORAL) 200 mg Tab Take ½ tablet (100 mg total) by mouth once daily.    lancets (MICROLET LANCET) Misc 100 lancets by Misc.(Non-Drug; Combo Route) route 4 (four) times daily before meals and nightly.    magnesium oxide (MAG-OX) 400 mg (241.3 mg magnesium) tablet Take 1 tablet (400 mg total) by mouth once daily.    metoprolol succinate (TOPROL-XL) 25 MG 24 hr tablet Take 1 tablet (25 mg total) by mouth once daily.    multivitamin (THERAGRAN) tablet Take 1 tablet by mouth once daily.    mycophenolate (CELLCEPT) 250 mg Cap Take 2 capsules (500 mg total) by mouth 2 (two) times daily.    NOVOLOG FLEXPEN U-100 INSULIN 100 unit/mL (3 mL) InPn pen Inject 25 Units into the skin 3 (three) times daily with meals.    ondansetron (ZOFRAN) 4 MG tablet Take 1 tablet (4 mg total) by mouth every 6 (six) hours.    ondansetron (ZOFRAN-ODT) 4 MG TbDL Dissolve 1 tablet (4 mg total) by mouth every 8 (eight) hours as needed (Nausea/vomiting).    potassium chloride SA (K-DUR,KLOR-CON) 20 MEQ tablet Take 2 tablets (40 mEq total) by mouth once daily.    predniSONE (DELTASONE) 5 MG tablet Take 1 tablet (5 mg total) by mouth once daily.    rosuvastatin (CRESTOR) 40 MG Tab Take 1 tablet (40 mg total) by mouth once daily.    sacubitriL-valsartan (ENTRESTO)  mg per tablet Take 1 tablet by mouth 2 (two) times daily.    semaglutide (OZEMPIC) 2 mg/dose (8 mg/3 mL) PnIj Inject 2 mg into the skin every 7 days.    tacrolimus (PROGRAF) 1 MG Cap Take 4 capsules (4mg) every morning AND take 3 capsules (3mg) by mouth every evening    triamcinolone acetonide 0.1% (KENALOG) 0.1 % ointment Apply topically 2 (two) times daily. Use on bilateral lower legs.     Family History        Problem Relation (Age of Onset)    Diabetes Mother, Sister, Maternal Grandmother    Heart failure Mother, Father    Hypertension Mother    Kidney disease Sister          Tobacco Use    Smoking status: Former     Current packs/day: 0.00     Types: Cigarettes     Quit date: 2013     Years since quittin.7     Passive exposure: Past    Smokeless tobacco: Former     Quit date: 2013    Tobacco comments:     used marijuana since 3088-4823, stopped after started dialysis   Substance and Sexual Activity    Alcohol use: No     Alcohol/week: 0.0 standard drinks of alcohol    Drug use: Not Currently     Comment:      Sexual activity: Never     Review of Systems   Constitutional:  Positive for fatigue. Negative for fever.   HENT:  Negative for sinus pressure.    Eyes:  Negative for visual disturbance.   Respiratory:  Negative for shortness of breath.    Cardiovascular:  Negative for chest pain.   Gastrointestinal:  Negative for nausea and vomiting.   Genitourinary:  Positive for decreased urine volume, difficulty urinating and dysuria.   Musculoskeletal:  Negative for back pain.   Skin:  Negative for rash.   Neurological:  Negative for headaches.   Psychiatric/Behavioral:  Negative for confusion.      Objective:     Vital Signs (Most Recent):  Temp: 98.3 °F (36.8 °C) (25 1222)  Pulse: 89 (25 1503)  Resp: (!) 25 (25 1503)  BP: (!) 119/57 (25 1503)  SpO2: 96 % (25 1503) Vital Signs (24h Range):  Temp:  [98.3 °F (36.8 °C)] 98.3 °F (36.8 °C)  Pulse:  [82-95] 89  Resp:  [16-27] 25  SpO2:  [96 %-98 %] 96 %  BP: ()/(35-95) 119/57     Weight: 120.7 kg (266 lb 1.5 oz)  Body mass index is 41.68 kg/m².     Physical Exam  Constitutional:       General: He is not in acute distress.     Appearance: He is well-developed. He is obese. He is not diaphoretic.   HENT:      Head: Normocephalic and atraumatic.   Eyes:      Pupils: Pupils are equal, round, and reactive to light.    Cardiovascular:      Rate and Rhythm: Normal rate and regular rhythm.      Heart sounds: Normal heart sounds. No murmur heard.     No friction rub. No gallop.   Pulmonary:      Effort: Pulmonary effort is normal. No respiratory distress.      Breath sounds: Normal breath sounds. No stridor. No wheezing or rales.   Abdominal:      General: Bowel sounds are normal. There is no distension.      Palpations: Abdomen is soft. There is no mass.      Tenderness: There is no abdominal tenderness. There is no guarding.   Genitourinary:     Comments: Ann in place, hematuria noted  Skin:     General: Skin is warm.      Findings: No erythema.   Neurological:      Mental Status: He is alert and oriented to person, place, and time.              CRANIAL NERVES     CN III, IV, VI   Pupils are equal, round, and reactive to light.       Significant Labs:   Results for orders placed or performed during the hospital encounter of 02/26/25   Brain natriuretic peptide    Collection Time: 02/26/25 12:48 PM   Result Value Ref Range     (H) 0 - 99 pg/mL   CBC auto differential    Collection Time: 02/26/25 12:48 PM   Result Value Ref Range    WBC 3.31 (L) 3.90 - 12.70 K/uL    RBC 3.30 (L) 4.60 - 6.20 M/uL    Hemoglobin 8.0 (L) 14.0 - 18.0 g/dL    Hematocrit 27.1 (L) 40.0 - 54.0 %    MCV 82 82 - 98 fL    MCH 24.2 (L) 27.0 - 31.0 pg    MCHC 29.5 (L) 32.0 - 36.0 g/dL    RDW 15.3 (H) 11.5 - 14.5 %    Platelets 213 150 - 450 K/uL    MPV 10.9 9.2 - 12.9 fL    Immature Granulocytes CANCELED 0.0 - 0.5 %    Immature Grans (Abs) CANCELED 0.00 - 0.04 K/uL    nRBC 0 0 /100 WBC    Gran % 66.0 38.0 - 73.0 %    Lymph % 31.0 18.0 - 48.0 %    Mono % 2.0 (L) 4.0 - 15.0 %    Eosinophil % 1.0 0.0 - 8.0 %    Basophil % 0.0 0.0 - 1.9 %    Differential Method Manual    Comprehensive metabolic panel    Collection Time: 02/26/25 12:48 PM   Result Value Ref Range    Sodium 136 136 - 145 mmol/L    Potassium 2.9 (L) 3.5 - 5.1 mmol/L    Chloride 100 95 - 110  mmol/L    CO2 25 23 - 29 mmol/L    Glucose 318 (H) 70 - 110 mg/dL    BUN 60 (H) 8 - 23 mg/dL    Creatinine 4.3 (H) 0.5 - 1.4 mg/dL    Calcium 8.7 8.7 - 10.5 mg/dL    Total Protein 5.5 (L) 6.0 - 8.4 g/dL    Albumin 1.9 (L) 3.5 - 5.2 g/dL    Total Bilirubin 0.5 0.1 - 1.0 mg/dL    Alkaline Phosphatase 114 40 - 150 U/L    AST 34 10 - 40 U/L    ALT 23 10 - 44 U/L    eGFR 14 (A) >60 mL/min/1.73 m^2    Anion Gap 11 8 - 16 mmol/L     *Note: Due to a large number of results and/or encounters for the requested time period, some results have not been displayed. A complete set of results can be found in Results Review.        Significant Imaging:   Imaging Results              X-Ray Chest 1 View (Final result)  Result time 02/26/25 13:46:25      Final result by Joe Leach MD (02/26/25 13:46:25)                   Impression:      1.  Negative for acute process involving the chest, considering there are chronically low lung volumes.    2.  Stable findings as noted above.      Electronically signed by: oJe Leach MD  Date:    02/26/2025  Time:    13:46               Narrative:    EXAMINATION:  XR CHEST 1 VIEW    CLINICAL HISTORY:  Dyspnea, unspecified    COMPARISON:  January 25, 2025, as well as studies dating back to February 10, 2023.    FINDINGS:  EKG leads overlie the chest.  Chronically low lung volumes.  The lungs are free of new pulmonary opacities.  The cardiac silhouette size is enlarged.  The trachea is midline and the mediastinal width is normal. Negative for focal infiltrate, effusion or pneumothorax. Pulmonary vasculature is normal. Negative for osseous abnormalities. Tortuous aorta with calcifications of the aortic knob.  There are degenerative changes of the spine and both shoulder girdles.  Right axillary region vascular stent again seen.                                      Assessment/Plan:     * ANGELITO (acute kidney injury)  ANGELITO is likely due to pre-renal azotemia due to dehydration. Baseline creatinine is   1.4 . Most recent creatinine and eGFR are listed below.  Recent Labs     02/26/25  1248   CREATININE 4.3*   EGFRNORACEVR 14*      Plan  Hold diuretics   Monitor urine output   Urine sodium and creatinine   Consult Nephrology on case       Gross hematuria  Possibly related to Ann insertion   Will continue to monitor   should hematuria persists will plan to consult Urology on case      Deep vein thrombosis (DVT) of lower extremity  Due to hematuria   Will hold anticoagulation for now      Chronic combined systolic and diastolic congestive heart failure  Patient has Combined Systolic and Diastolic heart failure that is Chronic. On presentation their CHF was well compensated. Most recent BNP and echo results are listed below.  Recent Labs     02/26/25  1248   *     Latest ECHO  Results for orders placed during the hospital encounter of 11/26/24    Echo    Interpretation Summary    Left Ventricle: The left ventricle is normal in size. Normal wall thickness. There is concentric hypertrophy. Normal wall motion. Septal motion is consistent with bundle branch block. There is moderately reduced systolic function. Ejection fraction is approximately 35%. Grade I diastolic dysfunction.    Right Ventricle: Normal right ventricular cavity size. Wall thickness is normal. Systolic function is normal.    IVC/SVC: Normal venous pressure at 3 mmHg.    Current Heart Failure Medications  metoprolol succinate (TOPROL-XL) 24 hr tablet 25 mg, Daily, Oral  hydrALAZINE injection 10 mg, Every 6 hours PRN, Intravenous    Plan  - Monitor strict I&Os and daily weights.    - Place on telemetry  - Low sodium diet  - Place on fluid restriction of 1.5 L.   - Cardiology has not been consulted  - The patient's volume status is at their baseline  - patient appears euvolemic        Type II diabetes mellitus with renal manifestations  Patient's FSGs are controlled on current medication regimen.  Last A1c reviewed-   Lab Results   Component Value  "Date    HGBA1C 5.7 (H) 12/17/2024     Most recent fingerstick glucose reviewed- No results for input(s): "POCTGLUCOSE" in the last 24 hours.  Current correctional scale  Medium  Maintain anti-hyperglycemic dose as follows-   Antihyperglycemics (From admission, onward)      Start     Stop Route Frequency Ordered    02/26/25 2100  insulin glargine U-100 (Lantus) pen 30 Units         -- SubQ Nightly 02/26/25 1451    02/26/25 1551  insulin aspart U-100 pen 0-10 Units         -- SubQ Before meals & nightly PRN 02/26/25 1451          Hold Oral hypoglycemics while patient is in the hospital.    Chronic immunosuppression with Prograf, MMF and prednisone  Will hold CellCept for now   Continue Prograf   Check Prograf levels   Continue prednisone      Class 3 severe obesity due to excess calories with body mass index (BMI) of 40.0 to 44.9 in adult  Body mass index is 41.68 kg/m². Morbid obesity complicates all aspects of disease management from diagnostic modalities to treatment. Weight loss encouraged and health benefits explained to patient.           VTE Risk Mitigation (From admission, onward)           Ordered     Place sequential compression device  Until discontinued         02/26/25 8996                                    Long Payan MD  Department of Hospital Medicine  [facility] - Emergency Dept.          "

## 2025-02-26 NOTE — ASSESSMENT & PLAN NOTE
Possibly related to Ann insertion   Will continue to monitor   should hematuria persists will plan to consult Urology on case

## 2025-02-26 NOTE — ASSESSMENT & PLAN NOTE
ANGELITO is likely due to pre-renal azotemia due to dehydration. Baseline creatinine is  1.4 . Most recent creatinine and eGFR are listed below.  Recent Labs     02/26/25  1248   CREATININE 4.3*   EGFRNORACEVR 14*      Plan  Hold diuretics   Monitor urine output   Urine sodium and creatinine   Consult Nephrology on case

## 2025-02-26 NOTE — PHARMACY MED REC
"Admission Medication History     The home medication history was taken by Rudolph Whittaker.    You may go to "Admission" then "Reconcile Home Medications" tabs to review and/or act upon these items.     The home medication list has been updated by the Pharmacy department.   Please read ALL comments highlighted in yellow.   Please address this information as you see fit.    Feel free to contact us if you have any questions or require assistance.      Medications listed below were obtained from: [employer]- [employer] and Medical records  Facility-Administered Medications Prior to Admission   Medication    acetaminophen tablet 650 mg     PTA Medications   Medication Sig    albuterol (PROVENTIL) 2.5 mg /3 mL (0.083 %) nebulizer solution Take 3 mLs (2.5 mg total) by nebulization every 4 to 6 hours as needed for Wheezing or Shortness of Breath.    albuterol (PROVENTIL/VENTOLIN HFA) 90 mcg/actuation inhaler Inhale 2 puffs into the lungs every 4 (four) hours as needed for Wheezing or Shortness of Breath.    amitriptyline (ELAVIL) 25 MG tablet Take 1 tablet (25 mg total) by mouth nightly as needed for Insomnia.    apixaban (ELIQUIS) 5 mg Tab Take 1 tablet (5 mg total) by mouth 2 (two) times daily.    aspirin (ECOTRIN) 81 MG EC tablet Take 1 tablet by mouth Daily.     BD ULTRA-FINE MINI PEN NEEDLE 31 gauge x 3/16" Ndle Use to inject insulin as needed up to 4 times daily    blood sugar diagnostic (CONTOUR NEXT TEST STRIPS) Strp Test blood sugar 4 (four) times daily before meals and nightly.    blood-glucose meter (FREESTYLE LITE METER) kit 1 each 4 (four) times daily.    blood-glucose sensor (DEXCOM G7 SENSOR) Alba Use as directed for continuous glucose monitoring; Change every 10 days.    calcitRIOL (ROCALTROL) 0.25 MCG Cap Take 1 capsule (0.25 mcg total) by mouth once daily.    famotidine (PEPCID) 20 MG tablet TAKE ONE TABLET BY MOUTH EVERY EVENING    ferrous sulfate (FEOSOL) 325 mg (65 mg iron) Tab tablet Take 1 " tablet (325 mg total) by mouth daily with breakfast.    fish oil-omega-3 fatty acids 300-1,000 mg capsule Take 1 g by mouth once daily.    furosemide (LASIX) 40 MG tablet Take 1 tablet (40 mg total) by mouth once daily.    HYDROcodone-acetaminophen (NORCO) 5-325 mg per tablet Take 1 tablet by mouth every 6 (six) hours as needed for Pain.    insulin glargine U-100, Lantus, (LANTUS SOLOSTAR U-100 INSULIN) 100 unit/mL (3 mL) InPn pen Inject 30 Units into the skin 2 (two) times daily.    ketoconazole (NIZORAL) 200 mg Tab Take ½ tablet (100 mg total) by mouth once daily.    lancets (MICROLET LANCET) Misc 100 lancets by Misc.(Non-Drug; Combo Route) route 4 (four) times daily before meals and nightly.    magnesium oxide (MAG-OX) 400 mg (241.3 mg magnesium) tablet Take 1 tablet (400 mg total) by mouth once daily.    metoprolol succinate (TOPROL-XL) 25 MG 24 hr tablet Take 1 tablet (25 mg total) by mouth once daily.    multivitamin (THERAGRAN) tablet Take 1 tablet by mouth once daily.    mycophenolate (CELLCEPT) 250 mg Cap Take 2 capsules (500 mg total) by mouth 2 (two) times daily.    NOVOLOG FLEXPEN U-100 INSULIN 100 unit/mL (3 mL) InPn pen Inject 25 Units into the skin 3 (three) times daily with meals.    ondansetron (ZOFRAN) 4 MG tablet Take 1 tablet (4 mg total) by mouth every 6 (six) hours.    ondansetron (ZOFRAN-ODT) 4 MG TbDL Dissolve 1 tablet (4 mg total) by mouth every 8 (eight) hours as needed (Nausea/vomiting).    potassium chloride SA (K-DUR,KLOR-CON) 20 MEQ tablet Take 2 tablets (40 mEq total) by mouth once daily.    predniSONE (DELTASONE) 5 MG tablet Take 1 tablet (5 mg total) by mouth once daily.    rosuvastatin (CRESTOR) 40 MG Tab Take 1 tablet (40 mg total) by mouth once daily.    sacubitriL-valsartan (ENTRESTO)  mg per tablet Take 1 tablet by mouth 2 (two) times daily.    semaglutide (OZEMPIC) 2 mg/dose (8 mg/3 mL) PnIj Inject 2 mg into the skin every 7 days.    tacrolimus (PROGRAF) 1 MG Cap Take 4  capsules (4mg) every morning AND take 3 capsules (3mg) by mouth every evening    triamcinolone acetonide 0.1% (KENALOG) 0.1 % ointment Apply topically 2 (two) times daily. Use on bilateral lower legs.       Rudolph Whittaker  [id number]                  .

## 2025-02-27 ENCOUNTER — DOCUMENTATION ONLY (OUTPATIENT)
Dept: CARDIOLOGY | Facility: CLINIC | Age: 70
End: 2025-02-27
Payer: COMMERCIAL

## 2025-02-27 PROBLEM — N39.0 UTI (URINARY TRACT INFECTION): Status: ACTIVE | Noted: 2025-02-27

## 2025-02-27 LAB
ANION GAP SERPL CALC-SCNC: 9 MMOL/L (ref 8–16)
BASOPHILS # BLD AUTO: ABNORMAL K/UL (ref 0–0.2)
BASOPHILS NFR BLD: 1 % (ref 0–1.9)
BUN SERPL-MCNC: 59 MG/DL (ref 8–23)
CALCIUM SERPL-MCNC: 7.9 MG/DL (ref 8.7–10.5)
CHLORIDE SERPL-SCNC: 107 MMOL/L (ref 95–110)
CO2 SERPL-SCNC: 23 MMOL/L (ref 23–29)
CREAT SERPL-MCNC: 4.2 MG/DL (ref 0.5–1.4)
DIFFERENTIAL METHOD BLD: ABNORMAL
EOSINOPHIL # BLD AUTO: ABNORMAL K/UL (ref 0–0.5)
EOSINOPHIL NFR BLD: 4 % (ref 0–8)
ERYTHROCYTE [DISTWIDTH] IN BLOOD BY AUTOMATED COUNT: 15 % (ref 11.5–14.5)
EST. GFR  (NO RACE VARIABLE): 14 ML/MIN/1.73 M^2
GLUCOSE SERPL-MCNC: 187 MG/DL (ref 70–110)
HCT VFR BLD AUTO: 23.9 % (ref 40–54)
HGB BLD-MCNC: 7 G/DL (ref 14–18)
IMM GRANULOCYTES # BLD AUTO: ABNORMAL K/UL (ref 0–0.04)
IMM GRANULOCYTES NFR BLD AUTO: ABNORMAL % (ref 0–0.5)
LYMPHOCYTES # BLD AUTO: ABNORMAL K/UL (ref 1–4.8)
LYMPHOCYTES NFR BLD: 31 % (ref 18–48)
MCH RBC QN AUTO: 24.1 PG (ref 27–31)
MCHC RBC AUTO-ENTMCNC: 29.3 G/DL (ref 32–36)
MCV RBC AUTO: 82 FL (ref 82–98)
MONOCYTES # BLD AUTO: ABNORMAL K/UL (ref 0.3–1)
MONOCYTES NFR BLD: 6 % (ref 4–15)
NEUTROPHILS NFR BLD: 58 % (ref 38–73)
NRBC BLD-RTO: 0 /100 WBC
OHS QRS DURATION: 152 MS
OHS QTC CALCULATION: 536 MS
OVALOCYTES BLD QL SMEAR: ABNORMAL
PLATELET # BLD AUTO: 194 K/UL (ref 150–450)
PLATELET BLD QL SMEAR: ABNORMAL
PMV BLD AUTO: 10.4 FL (ref 9.2–12.9)
POCT GLUCOSE: 169 MG/DL (ref 70–110)
POCT GLUCOSE: 178 MG/DL (ref 70–110)
POCT GLUCOSE: 258 MG/DL (ref 70–110)
POCT GLUCOSE: 273 MG/DL (ref 70–110)
POTASSIUM SERPL-SCNC: 3.2 MMOL/L (ref 3.5–5.1)
RBC # BLD AUTO: 2.91 M/UL (ref 4.6–6.2)
SODIUM SERPL-SCNC: 139 MMOL/L (ref 136–145)
TACROLIMUS BLD-MCNC: 3.4 NG/ML (ref 5–15)
WBC # BLD AUTO: 3.28 K/UL (ref 3.9–12.7)

## 2025-02-27 PROCEDURE — 80048 BASIC METABOLIC PNL TOTAL CA: CPT | Performed by: FAMILY MEDICINE

## 2025-02-27 PROCEDURE — 63600175 PHARM REV CODE 636 W HCPCS: Performed by: FAMILY MEDICINE

## 2025-02-27 PROCEDURE — 85027 COMPLETE CBC AUTOMATED: CPT | Performed by: FAMILY MEDICINE

## 2025-02-27 PROCEDURE — 85007 BL SMEAR W/DIFF WBC COUNT: CPT | Performed by: FAMILY MEDICINE

## 2025-02-27 PROCEDURE — 80197 ASSAY OF TACROLIMUS: CPT | Performed by: FAMILY MEDICINE

## 2025-02-27 PROCEDURE — 21400001 HC TELEMETRY ROOM

## 2025-02-27 PROCEDURE — 25000003 PHARM REV CODE 250: Performed by: INTERNAL MEDICINE

## 2025-02-27 PROCEDURE — 25000003 PHARM REV CODE 250

## 2025-02-27 PROCEDURE — 63600175 PHARM REV CODE 636 W HCPCS: Performed by: INTERNAL MEDICINE

## 2025-02-27 PROCEDURE — 99232 SBSQ HOSP IP/OBS MODERATE 35: CPT | Mod: ,,, | Performed by: INTERNAL MEDICINE

## 2025-02-27 PROCEDURE — 25000003 PHARM REV CODE 250: Performed by: FAMILY MEDICINE

## 2025-02-27 RX ORDER — CEFTRIAXONE 1 G/1
1 INJECTION, POWDER, FOR SOLUTION INTRAMUSCULAR; INTRAVENOUS
Status: DISCONTINUED | OUTPATIENT
Start: 2025-02-27 | End: 2025-03-03

## 2025-02-27 RX ORDER — POTASSIUM CHLORIDE 20 MEQ/1
40 TABLET, EXTENDED RELEASE ORAL ONCE
Status: COMPLETED | OUTPATIENT
Start: 2025-02-27 | End: 2025-02-27

## 2025-02-27 RX ORDER — GUAIFENESIN AND DEXTROMETHORPHAN HYDROBROMIDE 10; 100 MG/5ML; MG/5ML
5 SYRUP ORAL EVERY 4 HOURS PRN
Status: DISCONTINUED | OUTPATIENT
Start: 2025-02-27 | End: 2025-03-12 | Stop reason: HOSPADM

## 2025-02-27 RX ADMIN — CALCITRIOL CAPSULES 0.25 MCG 0.25 MCG: 0.25 CAPSULE ORAL at 08:02

## 2025-02-27 RX ADMIN — INSULIN ASPART 6 UNITS: 100 INJECTION, SOLUTION INTRAVENOUS; SUBCUTANEOUS at 03:02

## 2025-02-27 RX ADMIN — INSULIN GLARGINE 30 UNITS: 100 INJECTION, SOLUTION SUBCUTANEOUS at 10:02

## 2025-02-27 RX ADMIN — INSULIN ASPART 3 UNITS: 100 INJECTION, SOLUTION INTRAVENOUS; SUBCUTANEOUS at 10:02

## 2025-02-27 RX ADMIN — INSULIN ASPART 1 UNITS: 100 INJECTION, SOLUTION INTRAVENOUS; SUBCUTANEOUS at 06:02

## 2025-02-27 RX ADMIN — INSULIN ASPART 2 UNITS: 100 INJECTION, SOLUTION INTRAVENOUS; SUBCUTANEOUS at 11:02

## 2025-02-27 RX ADMIN — MYCOPHENOLATE MOFETIL 500 MG: 500 TABLET, FILM COATED ORAL at 09:02

## 2025-02-27 RX ADMIN — TACROLIMUS 4 MG: 1 CAPSULE ORAL at 08:02

## 2025-02-27 RX ADMIN — POTASSIUM CHLORIDE 40 MEQ: 1500 TABLET, EXTENDED RELEASE ORAL at 11:02

## 2025-02-27 RX ADMIN — SODIUM CHLORIDE: 9 INJECTION, SOLUTION INTRAVENOUS at 07:02

## 2025-02-27 RX ADMIN — TACROLIMUS 3 MG: 1 CAPSULE ORAL at 05:02

## 2025-02-27 RX ADMIN — CEFTRIAXONE 1 G: 1 INJECTION, POWDER, FOR SOLUTION INTRAMUSCULAR; INTRAVENOUS at 01:02

## 2025-02-27 RX ADMIN — GUAIFENESIN AND DEXTROMETHORPHAN 5 ML: 100; 10 SYRUP ORAL at 10:02

## 2025-02-27 RX ADMIN — GUAIFENESIN AND DEXTROMETHORPHAN 5 ML: 100; 10 SYRUP ORAL at 01:02

## 2025-02-27 RX ADMIN — FERROUS SULFATE TAB 325 MG (65 MG ELEMENTAL FE) 1 EACH: 325 (65 FE) TAB at 08:02

## 2025-02-27 RX ADMIN — MYCOPHENOLATE MOFETIL 500 MG: 500 TABLET, FILM COATED ORAL at 08:02

## 2025-02-27 RX ADMIN — METOPROLOL SUCCINATE 25 MG: 25 TABLET, EXTENDED RELEASE ORAL at 08:02

## 2025-02-27 RX ADMIN — PREDNISONE 5 MG: 5 TABLET ORAL at 08:02

## 2025-02-27 NOTE — PROGRESS NOTES
Nephrology Progress Note     History of Present Illness   Mitch Whittaker   is a 69 y.o. 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  Patient has a routine nephrology follow up visit today and part of his appointment he had lab work done that revealed acute on chronic renal failure, patient was advised to come to the emergency room for further evaluation.  According to the family patient has been in the [facility] rehab facility for some lower extremity weakness.  Admission labs revealed a BUN of 60 and a serum creatinine of 4.3 mg/dL.  Patient denies any pain in his abdomen or tenderness over his allograft.  He has a Ann catheter was placed in the ER that has bloody urine.  Blood pressure was slightly low on presentation which improved in the ER after fluid bolus.  We are consulted for acute on chronic renal failure       Interval History     Overnight/currently:  Course reviewed, no acute events overnight.  He is still with lower extremity weakness and has not been getting out of the bed.  He denies any shortness of breath, chest pain, nausea/vomiting.  Still with bloody urine via Ann catheter.  610 mL of urine output noted from yesterday.     Health Status   Allergies:    is allergic to lisinopril, actos  [pioglitazone], and metformin.    Current medications:   Current Medications[1]     Physical Examination   VS/Measurements   BP (!) 120/58   Pulse 96   Temp 99.1 °F (37.3 °C)   Resp 17   Wt 120.7 kg (266 lb 1.5 oz)   SpO2 96%   BMI 41.68 kg/m²      General:  Alert and oriented X3, No acute distress.         Nutritional status: Obese.    Neck:  Supple, No lymphadenopathy.      Respiratory:  Lungs are clear to auscultation, Respirations are non-labored, Symmetrical chest wall expansion.    Cardiovascular:  Normal rate, Regular rhythm.   Gastrointestinal:  Soft, Non-tender, Normal bowel sounds.  Allograft site nontender.  Integumentary:  Warm, Dry.   Extremities:  BLE edema present.  Psychiatric:  Cooperative, Appropriate mood & affect.        Review / Management   Laboratory Results   Today's Lab Results :    Recent Results (from the past 24 hours)   Brain natriuretic peptide    Collection Time: 02/26/25 12:48 PM   Result Value Ref Range     (H) 0 - 99 pg/mL   CBC auto differential    Collection Time: 02/26/25 12:48 PM   Result Value Ref Range    WBC 3.31 (L) 3.90 - 12.70 K/uL    RBC 3.30 (L) 4.60 - 6.20 M/uL    Hemoglobin 8.0 (L) 14.0 - 18.0 g/dL    Hematocrit 27.1 (L) 40.0 - 54.0 %    MCV 82 82 - 98 fL    MCH 24.2 (L) 27.0 - 31.0 pg    MCHC 29.5 (L) 32.0 - 36.0 g/dL    RDW 15.3 (H) 11.5 - 14.5 %    Platelets 213 150 - 450 K/uL    MPV 10.9 9.2 - 12.9 fL    Immature Granulocytes CANCELED 0.0 - 0.5 %    Immature Grans (Abs) CANCELED 0.00 - 0.04 K/uL    nRBC 0 0 /100 WBC    Gran % 66.0 38.0 - 73.0 %    Lymph % 31.0 18.0 - 48.0 %    Mono % 2.0 (L) 4.0 - 15.0 %    Eosinophil % 1.0 0.0 - 8.0 %    Basophil % 0.0 0.0 - 1.9 %    Differential Method Manual    Comprehensive metabolic panel    Collection Time: 02/26/25 12:48 PM   Result Value Ref Range    Sodium 136 136 - 145 mmol/L    Potassium 2.9 (L) 3.5 - 5.1 mmol/L    Chloride 100 95 - 110 mmol/L    CO2 25 23 - 29 mmol/L    Glucose 318 (H) 70 - 110 mg/dL    BUN 60 (H) 8 - 23 mg/dL    Creatinine 4.3 (H) 0.5 - 1.4 mg/dL    Calcium 8.7 8.7 - 10.5 mg/dL    Total Protein 5.5 (L) 6.0 - 8.4 g/dL    Albumin 1.9 (L) 3.5 - 5.2 g/dL    Total Bilirubin 0.5 0.1 - 1.0 mg/dL    Alkaline Phosphatase 114 40 - 150 U/L    AST 34 10 - 40 U/L    ALT 23 10 - 44 U/L    eGFR 14 (A) >60 mL/min/1.73 m^2    Anion Gap 11 8 - 16 mmol/L   Magnesium    Collection  Time: 02/26/25 12:48 PM   Result Value Ref Range    Magnesium 1.9 1.6 - 2.6 mg/dL   EKG 12-lead    Collection Time: 02/26/25 12:50 PM   Result Value Ref Range    QRS Duration 152 ms    [facility] QTC Calculation 536 ms   POCT glucose    Collection Time: 02/26/25  5:40 PM   Result Value Ref Range    POCT Glucose 360 (H) 70 - 110 mg/dL   Urinalysis, Reflex to Urine Culture Urine, Clean Catch    Collection Time: 02/26/25  6:22 PM    Specimen: Urine, Clean Catch   Result Value Ref Range    Specimen UA Urine, Clean Catch     Color, UA Yellow Yellow, Straw, Nikki    Appearance, UA Clear Clear    pH, UA 7.0 5.0 - 8.0    Specific Gravity, UA 1.020 1.005 - 1.030    Protein, UA 3+ (A) Negative    Glucose, UA Trace (A) Negative    Ketones, UA Negative Negative    Bilirubin (UA) Negative Negative    Occult Blood UA 3+ (A) Negative    Nitrite, UA Positive (A) Negative    Urobilinogen, UA 1.0 <2.0 EU/dL    Leukocytes, UA Trace (A) Negative   Sodium, urine, random    Collection Time: 02/26/25  6:22 PM   Result Value Ref Range    Sodium, Urine 54 20 - 250 mmol/L   Creatinine, urine, random    Collection Time: 02/26/25  6:22 PM   Result Value Ref Range    Creatinine, Urine 86.5 23.0 - 375.0 mg/dL   Urinalysis Microscopic    Collection Time: 02/26/25  6:22 PM   Result Value Ref Range    RBC, UA >100 (H) 0 - 4 /hpf    WBC, UA 10 (H) 0 - 5 /hpf    Bacteria Moderate (A) None-Occ /hpf    Hyaline Casts, UA 0 0-1/lpf /lpf    Microscopic Comment SEE COMMENT    POCT glucose    Collection Time: 02/26/25  8:10 PM   Result Value Ref Range    POCT Glucose 363 (H) 70 - 110 mg/dL   CBC Auto Differential    Collection Time: 02/27/25  5:30 AM   Result Value Ref Range    WBC 3.28 (L) 3.90 - 12.70 K/uL    RBC 2.91 (L) 4.60 - 6.20 M/uL    Hemoglobin 7.0 (L) 14.0 - 18.0 g/dL    Hematocrit 23.9 (L) 40.0 - 54.0 %    MCV 82 82 - 98 fL    MCH 24.1 (L) 27.0 - 31.0 pg    MCHC 29.3 (L) 32.0 - 36.0 g/dL    RDW 15.0 (H) 11.5 - 14.5 %    Platelets 194 150 - 450 K/uL     MPV 10.4 9.2 - 12.9 fL    Immature Granulocytes CANCELED 0.0 - 0.5 %    Immature Grans (Abs) CANCELED 0.00 - 0.04 K/uL    Lymph # CANCELED 1.0 - 4.8 K/uL    Mono # CANCELED 0.3 - 1.0 K/uL    Eos # CANCELED 0.0 - 0.5 K/uL    Baso # CANCELED 0.00 - 0.20 K/uL    nRBC 0 0 /100 WBC    Gran % 58.0 38.0 - 73.0 %    Lymph % 31.0 18.0 - 48.0 %    Mono % 6.0 4.0 - 15.0 %    Eosinophil % 4.0 0.0 - 8.0 %    Basophil % 1.0 0.0 - 1.9 %    Platelet Estimate Appears normal     Ovalocytes Occasional     Differential Method Manual    Basic Metabolic Panel    Collection Time: 02/27/25  5:30 AM   Result Value Ref Range    Sodium 139 136 - 145 mmol/L    Potassium 3.2 (L) 3.5 - 5.1 mmol/L    Chloride 107 95 - 110 mmol/L    CO2 23 23 - 29 mmol/L    Glucose 187 (H) 70 - 110 mg/dL    BUN 59 (H) 8 - 23 mg/dL    Creatinine 4.2 (H) 0.5 - 1.4 mg/dL    Calcium 7.9 (L) 8.7 - 10.5 mg/dL    Anion Gap 9 8 - 16 mmol/L    eGFR 14 (A) >60 mL/min/1.73 m^2   POCT glucose    Collection Time: 02/27/25  6:41 AM   Result Value Ref Range    POCT Glucose 169 (H) 70 - 110 mg/dL        Impression and Plan   Diagnosis   ANGELITO on CKD 3  - baseline serum creatinine about 1.5 mg/dL, admission creatinine 4.3.   -creatinine slightly improved at 4.2 today.  - Ann catheter with bloody urine noted.  Can be due to traumatic Ann placement.  It appears he is also on Eliquis outpatient for DVT which is currently being held.  Need to rule out acute rejection.  I spoke with nurse practitioner Jacob Magallanes at [facility] and she reports he was receiving all his immunosuppressants over there as prescribed.  - he is currently on normal saline at 75 mL/hr, he is noted to have some edema but his lungs sound relatively clear.  Monitor closely for fluid overload.  -FENa 2.0%  -Entresto currently on hold due to ANGELITO     S/p LRDKT in 2015, continue immunosuppression with Prograf 4/3, target Prograf level 4-7, prednisone 5 mg daily and CellCept 500 mg twice a day.  -Prograf level  pending  - renal ultrasound with Doppler shows stable elevation of intrarenal segmental resistive indices.  Findings may be seen in the setting of ATN, rejection and/or drug toxicity.  No hydronephrosis.  Interval increase in main renal artery peak systolic velocity at 216 centimeters/second.  Normal renal artery/iliac ratio maintained.  Of note, peak systolic velocity now located at the hilum, previously at the anastomosis.  Close follow up advised.  Will discuss with primary nephrologist.  - if no improvement in renal function most likely will need a transplant kidney biopsy.     Hypertension.  Acceptable blood pressure control.  Monitor.     Hypokalemia.  Slightly improved from yesterday after supplementation, we will order more potassium supplements for today.  Magnesium from yesterday okay.  Recheck in the morning.     HFrEF.  Last echo reports LV ejection fraction about 35%.  He also has grade 1 diastolic function.  Continue gentle hydration in view of acute kidney injury with a close monitoring of fluid status.     Anemia.  Likely multifactorial.  Monitor hemoglobin.  PRBC transfusion when indicated.  On oral iron.      ______________________________________________  FABRICIO Rosas    This document was created using voice recognition software.  It is possible that there are errors which have persisted after original proofreading.  If there is a question regarding contents of this document please contact me for clarification.         [1]   Current Facility-Administered Medications:     0.9% NaCl infusion, , Intravenous, Continuous, Berlin Pacheco MD, Last Rate: 75 mL/hr at 02/27/25 0736, New Bag at 02/27/25 0736    acetaminophen tablet 650 mg, 650 mg, Oral, Q6H PRN, Long Payan MD    amitriptyline tablet 25 mg, 25 mg, Oral, Nightly PRN, Long Payan MD    calcitRIOL capsule 0.25 mcg, 0.25 mcg, Oral, Daily, Long Payan MD, 0.25 mcg at 02/27/25 0850    dextrose 50% injection 12.5 g, 12.5 g, Intravenous,  PRN, Long Payan MD    dextrose 50% injection 25 g, 25 g, Intravenous, PRN, Long Payan MD    ferrous sulfate tablet 1 each, 1 tablet, Oral, Daily, Long Payan MD, 1 each at 02/27/25 0850    glucagon (human recombinant) injection 1 mg, 1 mg, Intramuscular, PRN, Long Payan MD    glucose chewable tablet 16 g, 16 g, Oral, PRN, Long Payan MD    glucose chewable tablet 24 g, 24 g, Oral, PRN, Long Payan MD    hydrALAZINE injection 10 mg, 10 mg, Intravenous, Q6H PRN, Long Payan MD    HYDROcodone-acetaminophen 5-325 mg per tablet 1 tablet, 1 tablet, Oral, Q6H PRN, Long Payan MD    insulin aspart U-100 pen 0-10 Units, 0-10 Units, Subcutaneous, QID (AC + HS) PRN, Long Payan MD, 1 Units at 02/27/25 0642    insulin glargine U-100 (Lantus) pen 30 Units, 30 Units, Subcutaneous, QHS, Long Payan MD, 30 Units at 02/26/25 2012    metoprolol succinate (TOPROL-XL) 24 hr tablet 25 mg, 25 mg, Oral, Daily, Long Payan MD, 25 mg at 02/27/25 0850    mycophenolate tablet 500 mg, 500 mg, Oral, BID, Berlin Pacheco MD, 500 mg at 02/27/25 0850    ondansetron disintegrating tablet 4 mg, 4 mg, Oral, Q6H PRN, Long Payan MD    potassium chloride SA CR tablet 40 mEq, 40 mEq, Oral, Once, Chase Morris, ERICAP    predniSONE tablet 5 mg, 5 mg, Oral, Daily, Long Payan MD, 5 mg at 02/27/25 0850    tacrolimus capsule 4 mg, 4 mg, Oral, Daily AM, 4 mg at 02/27/25 0851 **AND** tacrolimus capsule 3 mg, 3 mg, Oral, Daily PM, Long Payan MD, 3 mg at 02/26/25 9426

## 2025-02-27 NOTE — PROGRESS NOTES
[facility] Medicine  Progress Note    Patient Name: Mitch Whittaker  MRN: [id number]  Patient Class: IP- Inpatient   Admission Date: 2/26/2025  Length of Stay: 1 days  Attending Physician: Long Payan MD  Primary Care Provider: Valery Caal MD        Subjective     Principal Problem:ANGELITO (acute kidney injury)        HPI:  Patient is a 69-year-old  male with a PMH of CHF, kidney transplant, CAD, DM, DVT who presents to the ED with complaints of weakness.  Patient is a resident at [facility].  He states he was sent here for renal failure.  Patient does report still producing urine however it is decreased.  Does complain of some dysuria.  Reports that oral intake is good.  Denies any fever or chills.  No other issues reported at this time.    In the ED, H&H 8.0/27, K:  2.9, BUN/CR 60/4.3.  ED discussed with Dr. Gonsalez who recommended fluid challenge due to concern with over-diuresis.        Overview/Hospital Course:  02/27/2025  Will start empiric Rocephin for UTI.  Creatinine stable.  Continue IVF.  Nephrology on case.  Patient may need renal biopsy.    Interval History:  No acute issues reported overnight.    Review of Systems   Constitutional:  Positive for fatigue. Negative for fever.   HENT:  Negative for sinus pressure.    Eyes:  Negative for visual disturbance.   Respiratory:  Negative for shortness of breath.    Cardiovascular:  Negative for chest pain.   Gastrointestinal:  Negative for nausea and vomiting.   Genitourinary:  Positive for decreased urine volume, difficulty urinating, dysuria and hematuria.   Musculoskeletal:  Negative for back pain.   Skin:  Negative for rash.   Neurological:  Negative for headaches.   Psychiatric/Behavioral:  Negative for confusion.      Objective:     Vital Signs (Most Recent):  Temp: 98 °F (36.7 °C) (02/27/25 1126)  Pulse: (!) 112 (02/27/25 1126)  Resp: 17 (02/27/25 1126)  BP: (!) 108/58 (02/27/25 1126)  SpO2: 97 % (02/27/25 1126) Vital  Signs (24h Range):  Temp:  [98 °F (36.7 °C)-99.3 °F (37.4 °C)] 98 °F (36.7 °C)  Pulse:  [] 112  Resp:  [16-27] 17  SpO2:  [94 %-98 %] 97 %  BP: ()/(35-95) 108/58     Weight: 120.7 kg (266 lb 1.5 oz)  Body mass index is 41.68 kg/m².    Intake/Output Summary (Last 24 hours) at 2/27/2025 1207  Last data filed at 2/27/2025 0648  Gross per 24 hour   Intake --   Output 610 ml   Net -610 ml         Physical Exam  Constitutional:       General: He is not in acute distress.     Appearance: He is well-developed. He is obese. He is not diaphoretic.   HENT:      Head: Normocephalic and atraumatic.   Eyes:      Pupils: Pupils are equal, round, and reactive to light.   Cardiovascular:      Rate and Rhythm: Normal rate and regular rhythm.      Heart sounds: Normal heart sounds. No murmur heard.     No friction rub. No gallop.   Pulmonary:      Effort: Pulmonary effort is normal. No respiratory distress.      Breath sounds: Normal breath sounds. No stridor. No wheezing or rales.   Abdominal:      General: Bowel sounds are normal. There is no distension.      Palpations: Abdomen is soft. There is no mass.      Tenderness: There is no abdominal tenderness. There is no guarding.   Genitourinary:     Comments: Ann in place, hematuria noted  Skin:     General: Skin is warm.      Findings: No erythema.   Neurological:      Mental Status: He is alert and oriented to person, place, and time.             Significant Labs: All pertinent labs within the past 24 hours have been reviewed.    Significant Imaging: I have reviewed all pertinent imaging results/findings within the past 24 hours.    Assessment and Plan     * ANGELITO (acute kidney injury)  ANGELITO is likely due to pre-renal azotemia due to dehydration. Baseline creatinine is  1.4 . Most recent creatinine and eGFR are listed below.  Recent Labs     02/26/25  1248 02/27/25  0530   CREATININE 4.3* 4.2*   EGFRNORACEVR 14* 14*        Plan  Hold diuretics   Monitor urine output    Urine sodium and creatinine   Consult Nephrology on case   Continue IVF  Patient may need renal biopsy      UTI (urinary tract infection)  Continue Rocephin   Urine culture pending      Gross hematuria  Possibly related to Ann insertion   Will continue to monitor   should hematuria persists will plan to consult Urology on case      Deep vein thrombosis (DVT) of lower extremity  Due to hematuria   Will hold anticoagulation for now      Chronic combined systolic and diastolic congestive heart failure  Patient has Combined Systolic and Diastolic heart failure that is Chronic. On presentation their CHF was well compensated. Most recent BNP and echo results are listed below.  Recent Labs     02/26/25  1248   *     Latest ECHO  Results for orders placed during the hospital encounter of 11/26/24    Echo    Interpretation Summary    Left Ventricle: The left ventricle is normal in size. Normal wall thickness. There is concentric hypertrophy. Normal wall motion. Septal motion is consistent with bundle branch block. There is moderately reduced systolic function. Ejection fraction is approximately 35%. Grade I diastolic dysfunction.    Right Ventricle: Normal right ventricular cavity size. Wall thickness is normal. Systolic function is normal.    IVC/SVC: Normal venous pressure at 3 mmHg.    Current Heart Failure Medications  metoprolol succinate (TOPROL-XL) 24 hr tablet 25 mg, Daily, Oral  hydrALAZINE injection 10 mg, Every 6 hours PRN, Intravenous    Plan  - Monitor strict I&Os and daily weights.    - Place on telemetry  - Low sodium diet  - Place on fluid restriction of 1.5 L.   - Cardiology has not been consulted  - The patient's volume status is at their baseline  - patient appears euvolemic        Type II diabetes mellitus with renal manifestations  Patient's FSGs are controlled on current medication regimen.  Last A1c reviewed-   Lab Results   Component Value Date    HGBA1C 5.7 (H) 12/17/2024     Most recent  "fingerstick glucose reviewed- No results for input(s): "POCTGLUCOSE" in the last 24 hours.  Current correctional scale  Medium  Maintain anti-hyperglycemic dose as follows-   Antihyperglycemics (From admission, onward)      Start     Stop Route Frequency Ordered    02/26/25 2100  insulin glargine U-100 (Lantus) pen 30 Units         -- SubQ Nightly 02/26/25 1451    02/26/25 1551  insulin aspart U-100 pen 0-10 Units         -- SubQ Before meals & nightly PRN 02/26/25 1451          Hold Oral hypoglycemics while patient is in the hospital.    Chronic immunosuppression with Prograf, MMF and prednisone  Will hold CellCept for now   Continue Prograf   Check Prograf levels   Continue prednisone      Class 3 severe obesity due to excess calories with body mass index (BMI) of 40.0 to 44.9 in adult  Body mass index is 41.68 kg/m². Morbid obesity complicates all aspects of disease management from diagnostic modalities to treatment. Weight loss encouraged and health benefits explained to patient.           VTE Risk Mitigation (From admission, onward)           Ordered     Place sequential compression device  Until discontinued         02/26/25 1607                    Discharge Planning   GRETEL:      Code Status: Full Code   Medical Readiness for Discharge Date:                            Long Payan MD  Department of Hospital Medicine   [facility] - Med Surg    "

## 2025-02-27 NOTE — ASSESSMENT & PLAN NOTE
ANGELITO is likely due to pre-renal azotemia due to dehydration. Baseline creatinine is  1.4 . Most recent creatinine and eGFR are listed below.  Recent Labs     02/26/25  1248 02/27/25  0530   CREATININE 4.3* 4.2*   EGFRNORACEVR 14* 14*        Plan  Hold diuretics   Monitor urine output   Urine sodium and creatinine   Consult Nephrology on case   Continue IVF  Patient may need renal biopsy

## 2025-02-27 NOTE — HOSPITAL COURSE
02/27/2025  Will start empiric Rocephin for UTI.  Creatinine stable.  Continue IVF.  Nephrology on case.  Patient may need renal biopsy.  02/28/2025  Creatinine trending down.  Will continue IVF.  Gross hematuria improving.  Continue Rocephin for UTI.  3/1 creatinine continues to improve. Discontinue intravenous fluids per nephrology. Discontinue gan per nephrology, monitor for urinary retention. Discussed blood transfusion with nephrology and patient also agreeable. No transfusion reaction in the past.  3/2 urology consulted. hematuria improving. Denies abdominal pain. Complains of weakness and cough and leg swelling. Physical/occupational therapy consulted. Creatinine improving  3/3: creatinine continues to improve. Entresto and diuretics remains on hold. Episode of hematuria with clots overnight, but resolved this AM. Outpatient follow up with Urology for cystoscopy. Continue Rocephin for UTI  3/4: Creatine has returned to baseline. He has completed Rocephin for UTI. Intermittent episodes of hematuria that began last night. Patient is urinating with difficulty. Per urology still recommends outpatient follow up. Awaiting decision for SNF versus .   3/5/25: patient and family are agreeable to SNF. Urine dark yellow today. Hemoglobin still below goal of 8g/dL. Will transfuse additional unit today and give Bumex post transfusion.   3/6/25: Awaiting placement at [facility]  3/7/25: facility is waiting on financial documents. Family will turn in this weekend or will discharge home with .  3/8/25: Financial documents faxed over. Awaiting placement.   3/9/25: complaints of diarrhea over the weekend. C. Diff positive. Oral Vancomycin started. Hold Cellcept  3/10/25: pt had 6 diarrhea overnight- cont oral Vanco and cholestyramine   3/11/25: diarrhea improving. Discussed with Dr. Villasenor who recommended po Vanco x 10 days and Op f/u. Pt unable to provide SNF with financials. CM recommended home with home health.  Hospital bed and equipment to be arranged.  3/12/25: HH and home equipment arranged.  Pt reports only one diarrhea since yesterday. No further hematuria. H/H stable. Pt will be discharged on oral Vanco. Eliquis has been restarted. F/u with urology, cardiology, ID, and nephrology. The patient was seen and examined today and was stable for discharge.

## 2025-02-27 NOTE — CONSULTS
[facility] Surg  Wound Care    Patient Name:  Mitch Whittaker   MRN:  [id number]  Date: 2/27/2025  Diagnosis: ANGELITO (acute kidney injury)    History:     Past Medical History:   Diagnosis Date    Acquired renal cyst of left kidney     Anemia associated with chronic renal failure     CAD (coronary artery disease)     nonobstructive [facility] 9/14    CHF (congestive heart failure)     Chronic immunosuppression with Prograf and MMF 06/18/2015    Chronic venous insufficiency of lower extremity     CKD (chronic kidney disease) stage 3, GFR 30-59 ml/min     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 RNA NEGATIVE 5/2015, 2021, 2022    History of colon polyps     HPTH (hyperparathyroidism)     Hyperlipidemia     Hypertension associated with stage 3 chronic kidney disease due to type 2 diabetes mellitus     LBBB (left bundle branch block) 12/20/2021    Morbid obesity with BMI of 45.0-49.9, adult     Nephrolithiasis 6/7/2013    PCO (posterior capsular opacification), left 03/04/2019    Proteinuria     resolved s/p transplant    S/P kidney transplant     Sleep apnea     Type 2 diabetes, uncontrolled, with retinopathy     Type II diabetes mellitus with renal manifestations        Social History[1]    Precautions:     Allergies as of 02/26/2025 - Reviewed 02/26/2025   Allergen Reaction Noted    Lisinopril Other (See Comments) 04/12/2013    Actos  [pioglitazone] Other (See Comments) 04/12/2013    Metformin Other (See Comments) 04/12/2013       WO Assessment Details/Treatment         Consulted to evaluate wounds to BLE's.  Mr. Whittaker is a 68 yo male patient well-known to wound care team as we have  followed his care during last couple of hospital admissions (I last saw patient 1/23/25---noted venous ulcers were resolved @ that time).  Readmitted 2/26/25 with acute kidney injury.  PMH includes:  CHF, kidney transplant, CAD, DM, & DVT.    This morning, patient is resting quietly in bed, awake and alert; spouse @ bedside.    Noted moderate amount of thick, congealed bloody drainage around Ann catheter, down patient's B thighs and under patient's buttocks on linens.  Nursing attendants arrived @ bedside; assisted to cleanse skin using soft bath cloths and place another Ann catheter securement device to R thigh.    Turned patient onto his L side; noted small amount of soft mushy brown stool.  Attendant cleansed skin to B buttocks/perineum using soft bath cloths.  Evaluated skin:  noted no redness or skin breakdown to sacrum, coccyx, perineum or buttocks @ this time.  Recommend:  barrier paste daily after bath and prn each episode of yolis-care provided by nursing staff---barrier paste applied.    Noted BLE's remain w/ slight edema, scarring, and hemosiderin staining which is essentially unchanged since my  last assessment.  Noted no skin breakdown--no open wound.  No exudate noted.  Wife states he has NOT been wearing compression lately.    Gently removed lifting dried blister cap from R heel; remains w/ intact pink scar tissue which blanches--no open wound remains.  Noted L heel intact without redness or skin breakdown.    Recommend:  application of Sween cream (moisturizer) daily after bath.  As edema seems well under control, will not reapply Tubigrip tubular dressing @ this time.  Would continue use of offloading heel boots to BLE's to prevent recurrence of heel injury.  Initiated pressure injury prevention measures; delivered foam wedge pillow to bedside to be used for positioning when turning.  Ordered addition of low air-loss pump to be applied to current pressure redistribution mattress.      Will  sign-off for now; however, please re-consult for any further wound care needs.         25 1055   WOCN Assessment   WOCN Total Time (mins) 30   Visit Date 25   Visit Time 1055   Consult Type New   WOCN Speciality Wound   Wound At risk for pressure Injury   Intervention assessed;applied;chart review;coordination of care   Teaching on-going   Skin Interventions   Device Skin Pressure Protection absorbent pad utilized/changed   Pressure Reduction Devices pressure-redistributing mattress utilized;heel offloading device utilized   Pressure Reduction Techniques weight shift assistance provided   Skin Protection incontinence pads utilized   Positioning   Body Position turned;head facing, left   Head of Bed (HOB) Positioning HOB elevated   Positioning/Transfer Devices pillows;in use   Pressure Injury Prevention    Check Moisture Management Pad Done   Sacral Foam Dressing Patient incontinent, barrier cream applied   Heel protection technique Heel boot   Check Medical Devices Done         2025         [1]   Social History  Socioeconomic History    Marital status: Legally     Number of children: 2   Occupational History    Occupation: retired     Employer: Retired   Tobacco Use    Smoking status: Former     Current packs/day: 0.00     Types: Cigarettes     Quit date: 2013     Years since quittin.7     Passive exposure: Past    Smokeless tobacco: Former     Quit date: 2013    Tobacco comments:     used marijuana since [id number], stopped after started dialysis   Substance and Sexual Activity    Alcohol use: No     Alcohol/week: 0.0 standard drinks of alcohol    Drug use: Not Currently     Comment:      Sexual activity: Never   Social History Narrative    . Lives with spouse. Has 2 children. Patient retired as  for [employer]. He has been washing cars.     Social Drivers of Health     Financial Resource Strain: Low Risk  (2025)    Overall Financial Resource  Strain (CARDIA)     Difficulty of Paying Living Expenses: Not hard at all   Food Insecurity: No Food Insecurity (2/26/2025)    Hunger Vital Sign     Worried About Running Out of Food in the Last Year: Never true     Ran Out of Food in the Last Year: Never true   Transportation Needs: No Transportation Needs (1/30/2025)    Received from [facility] - Transportation     Lack of Transportation (Medical): No     Lack of Transportation (Non-Medical): No   Physical Activity: Inactive (2/26/2025)    Exercise Vital Sign     Days of Exercise per Week: 0 days     Minutes of Exercise per Session: 0 min   Stress: No Stress Concern Present (2/26/2025)    [employer] - Occupational Stress Questionnaire     Feeling of Stress : Not at all   Housing Stability: Low Risk  (2/26/2025)    Housing Stability Vital Sign     Unable to Pay for Housing in the Last Year: No     Homeless in the Last Year: No

## 2025-02-27 NOTE — SUBJECTIVE & OBJECTIVE
Interval History:  No acute issues reported overnight.    Review of Systems   Constitutional:  Positive for fatigue. Negative for fever.   HENT:  Negative for sinus pressure.    Eyes:  Negative for visual disturbance.   Respiratory:  Negative for shortness of breath.    Cardiovascular:  Negative for chest pain.   Gastrointestinal:  Negative for nausea and vomiting.   Genitourinary:  Positive for decreased urine volume, difficulty urinating, dysuria and hematuria.   Musculoskeletal:  Negative for back pain.   Skin:  Negative for rash.   Neurological:  Negative for headaches.   Psychiatric/Behavioral:  Negative for confusion.      Objective:     Vital Signs (Most Recent):  Temp: 98 °F (36.7 °C) (02/27/25 1126)  Pulse: (!) 112 (02/27/25 1126)  Resp: 17 (02/27/25 1126)  BP: (!) 108/58 (02/27/25 1126)  SpO2: 97 % (02/27/25 1126) Vital Signs (24h Range):  Temp:  [98 °F (36.7 °C)-99.3 °F (37.4 °C)] 98 °F (36.7 °C)  Pulse:  [] 112  Resp:  [16-27] 17  SpO2:  [94 %-98 %] 97 %  BP: ()/(35-95) 108/58     Weight: 120.7 kg (266 lb 1.5 oz)  Body mass index is 41.68 kg/m².    Intake/Output Summary (Last 24 hours) at 2/27/2025 1207  Last data filed at 2/27/2025 0648  Gross per 24 hour   Intake --   Output 610 ml   Net -610 ml         Physical Exam  Constitutional:       General: He is not in acute distress.     Appearance: He is well-developed. He is obese. He is not diaphoretic.   HENT:      Head: Normocephalic and atraumatic.   Eyes:      Pupils: Pupils are equal, round, and reactive to light.   Cardiovascular:      Rate and Rhythm: Normal rate and regular rhythm.      Heart sounds: Normal heart sounds. No murmur heard.     No friction rub. No gallop.   Pulmonary:      Effort: Pulmonary effort is normal. No respiratory distress.      Breath sounds: Normal breath sounds. No stridor. No wheezing or rales.   Abdominal:      General: Bowel sounds are normal. There is no distension.      Palpations: Abdomen is soft. There  is no mass.      Tenderness: There is no abdominal tenderness. There is no guarding.   Genitourinary:     Comments: Ann in place, hematuria noted  Skin:     General: Skin is warm.      Findings: No erythema.   Neurological:      Mental Status: He is alert and oriented to person, place, and time.             Significant Labs: All pertinent labs within the past 24 hours have been reviewed.    Significant Imaging: I have reviewed all pertinent imaging results/findings within the past 24 hours.

## 2025-02-27 NOTE — PROGRESS NOTES
[facility] ([facility]    Patient screened today by provider and HF nurse for enrollment to program.      Spoke with pt and his daughter. Pt lives at [facility]. He will F/U up with his own cardiologist, Dr. Muhammad upon discharge.    Pt will require additional follow up planning per primary team.

## 2025-02-28 LAB
ANION GAP SERPL CALC-SCNC: 12 MMOL/L (ref 8–16)
BASOPHILS NFR BLD: 0 % (ref 0–1.9)
BUN SERPL-MCNC: 50 MG/DL (ref 8–23)
CALCIUM SERPL-MCNC: 8.6 MG/DL (ref 8.7–10.5)
CHLORIDE SERPL-SCNC: 110 MMOL/L (ref 95–110)
CO2 SERPL-SCNC: 20 MMOL/L (ref 23–29)
CREAT SERPL-MCNC: 3.6 MG/DL (ref 0.5–1.4)
DIFFERENTIAL METHOD BLD: ABNORMAL
EOSINOPHIL NFR BLD: 1 % (ref 0–8)
ERYTHROCYTE [DISTWIDTH] IN BLOOD BY AUTOMATED COUNT: 15.1 % (ref 11.5–14.5)
EST. GFR  (NO RACE VARIABLE): 17 ML/MIN/1.73 M^2
GLUCOSE SERPL-MCNC: 163 MG/DL (ref 70–110)
HCT VFR BLD AUTO: 25.6 % (ref 40–54)
HGB BLD-MCNC: 7.4 G/DL (ref 14–18)
IMM GRANULOCYTES # BLD AUTO: ABNORMAL K/UL (ref 0–0.04)
IMM GRANULOCYTES NFR BLD AUTO: ABNORMAL % (ref 0–0.5)
LYMPHOCYTES NFR BLD: 22 % (ref 18–48)
MAGNESIUM SERPL-MCNC: 1.9 MG/DL (ref 1.6–2.6)
MCH RBC QN AUTO: 24.1 PG (ref 27–31)
MCHC RBC AUTO-ENTMCNC: 28.9 G/DL (ref 32–36)
MCV RBC AUTO: 83 FL (ref 82–98)
MONOCYTES NFR BLD: 6 % (ref 4–15)
MYELOCYTES NFR BLD MANUAL: 1 %
NEUTROPHILS NFR BLD: 69 % (ref 38–73)
NEUTS BAND NFR BLD MANUAL: 1 %
NRBC BLD-RTO: 0 /100 WBC
OVALOCYTES BLD QL SMEAR: ABNORMAL
PHOSPHATE SERPL-MCNC: 3 MG/DL (ref 2.7–4.5)
PLATELET # BLD AUTO: 195 K/UL (ref 150–450)
PLATELET BLD QL SMEAR: ABNORMAL
PMV BLD AUTO: 10.5 FL (ref 9.2–12.9)
POCT GLUCOSE: 140 MG/DL (ref 70–110)
POCT GLUCOSE: 166 MG/DL (ref 70–110)
POCT GLUCOSE: 186 MG/DL (ref 70–110)
POCT GLUCOSE: 224 MG/DL (ref 70–110)
POIKILOCYTOSIS BLD QL SMEAR: SLIGHT
POTASSIUM SERPL-SCNC: 3.5 MMOL/L (ref 3.5–5.1)
RBC # BLD AUTO: 3.07 M/UL (ref 4.6–6.2)
SODIUM SERPL-SCNC: 142 MMOL/L (ref 136–145)
WBC # BLD AUTO: 3.78 K/UL (ref 3.9–12.7)

## 2025-02-28 PROCEDURE — 84100 ASSAY OF PHOSPHORUS: CPT

## 2025-02-28 PROCEDURE — 85027 COMPLETE CBC AUTOMATED: CPT | Performed by: FAMILY MEDICINE

## 2025-02-28 PROCEDURE — 63600175 PHARM REV CODE 636 W HCPCS: Performed by: INTERNAL MEDICINE

## 2025-02-28 PROCEDURE — 83735 ASSAY OF MAGNESIUM: CPT

## 2025-02-28 PROCEDURE — 99232 SBSQ HOSP IP/OBS MODERATE 35: CPT | Mod: ,,, | Performed by: INTERNAL MEDICINE

## 2025-02-28 PROCEDURE — 80048 BASIC METABOLIC PNL TOTAL CA: CPT | Performed by: FAMILY MEDICINE

## 2025-02-28 PROCEDURE — 63600175 PHARM REV CODE 636 W HCPCS: Performed by: FAMILY MEDICINE

## 2025-02-28 PROCEDURE — 85007 BL SMEAR W/DIFF WBC COUNT: CPT | Performed by: FAMILY MEDICINE

## 2025-02-28 PROCEDURE — 25000003 PHARM REV CODE 250: Performed by: FAMILY MEDICINE

## 2025-02-28 PROCEDURE — 21400001 HC TELEMETRY ROOM

## 2025-02-28 RX ADMIN — TACROLIMUS 3 MG: 1 CAPSULE ORAL at 05:02

## 2025-02-28 RX ADMIN — FERROUS SULFATE TAB 325 MG (65 MG ELEMENTAL FE) 1 EACH: 325 (65 FE) TAB at 08:02

## 2025-02-28 RX ADMIN — INSULIN GLARGINE 30 UNITS: 100 INJECTION, SOLUTION SUBCUTANEOUS at 08:02

## 2025-02-28 RX ADMIN — MYCOPHENOLATE MOFETIL 500 MG: 500 TABLET, FILM COATED ORAL at 08:02

## 2025-02-28 RX ADMIN — CALCITRIOL CAPSULES 0.25 MCG 0.25 MCG: 0.25 CAPSULE ORAL at 08:02

## 2025-02-28 RX ADMIN — CEFTRIAXONE 1 G: 1 INJECTION, POWDER, FOR SOLUTION INTRAMUSCULAR; INTRAVENOUS at 12:02

## 2025-02-28 RX ADMIN — PREDNISONE 5 MG: 5 TABLET ORAL at 08:02

## 2025-02-28 RX ADMIN — INSULIN ASPART 2 UNITS: 100 INJECTION, SOLUTION INTRAVENOUS; SUBCUTANEOUS at 04:02

## 2025-02-28 RX ADMIN — TACROLIMUS 4 MG: 1 CAPSULE ORAL at 08:02

## 2025-02-28 RX ADMIN — METOPROLOL SUCCINATE 25 MG: 25 TABLET, EXTENDED RELEASE ORAL at 08:02

## 2025-02-28 NOTE — PLAN OF CARE
Problem: Adult Inpatient Plan of Care  Goal: Plan of Care Review  Outcome: Progressing  Goal: Patient-Specific Goal (Individualized)  Outcome: Progressing  Goal: Absence of Hospital-Acquired Illness or Injury  Outcome: Progressing  Goal: Optimal Comfort and Wellbeing  Outcome: Progressing  Goal: Readiness for Transition of Care  Outcome: Progressing     Problem: Bariatric Environmental Safety  Goal: Safety Maintained with Care  Outcome: Progressing     Problem: Diabetes Comorbidity  Goal: Blood Glucose Level Within Targeted Range  Outcome: Progressing     Problem: Acute Kidney Injury/Impairment  Goal: Fluid and Electrolyte Balance  Outcome: Progressing  Goal: Improved Oral Intake  Outcome: Progressing  Goal: Effective Renal Function  Outcome: Progressing     Problem: Skin Injury Risk Increased  Goal: Skin Health and Integrity  Outcome: Progressing     Problem: Infection  Goal: Absence of Infection Signs and Symptoms  Outcome: Progressing

## 2025-02-28 NOTE — PROGRESS NOTES
Nephrology Progress Note     History of Present Illness     Mitch Whittkaer   is a 69 y.o. 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  Patient has a routine nephrology follow up visit today and part of his appointment he had lab work done that revealed acute on chronic renal failure, patient was advised to come to the emergency room for further evaluation.  According to the family patient has been in the [facility] rehab facility for some lower extremity weakness.  Admission labs revealed a BUN of 60 and a serum creatinine of 4.3 mg/dL.  Patient denies any pain in his abdomen or tenderness over his allograft.  He has a Ann catheter was placed in the ER that has bloody urine.  Blood pressure was slightly low on presentation which improved in the ER after fluid bolus.  We are consulted for acute on chronic renal failure       Interval History   Overnight/currently:  Patient denies any chest pain, short of breath, nausea or vomiting.        Allergies:    is allergic to lisinopril, actos  [pioglitazone], and metformin.    Current medications:   Scheduled Meds:   calcitRIOL  0.25 mcg Oral Daily    cefTRIAXone (Rocephin) IV (PEDS and ADULTS)  1 g Intravenous Q24H    ferrous sulfate  1 tablet Oral Daily    insulin glargine U-100  30 Units Subcutaneous QHS    metoprolol succinate  25 mg Oral Daily    mycophenolate  500 mg Oral BID    predniSONE  5 mg Oral Daily    tacrolimus  4 mg Oral Daily AM    And    tacrolimus  3 mg Oral Daily PM     Continuous Infusions:   0.9% NaCl   Intravenous Continuous 75 mL/hr at 02/27/25 0736 New Bag at 02/27/25 0736     PRN Meds:.  Current Facility-Administered  Medications:     acetaminophen, 650 mg, Oral, Q6H PRN    amitriptyline, 25 mg, Oral, Nightly PRN    dextromethorphan-guaiFENesin  mg/5 ml, 5 mL, Oral, Q4H PRN    dextrose 50%, 12.5 g, Intravenous, PRN    dextrose 50%, 25 g, Intravenous, PRN    glucagon (human recombinant), 1 mg, Intramuscular, PRN    glucose, 16 g, Oral, PRN    glucose, 24 g, Oral, PRN    hydrALAZINE, 10 mg, Intravenous, Q6H PRN    HYDROcodone-acetaminophen, 1 tablet, Oral, Q6H PRN    insulin aspart U-100, 0-10 Units, Subcutaneous, QID (AC + HS) PRN    ondansetron, 4 mg, Oral, Q6H PRN     Physical Examination     VS/Measurements    /62 (BP Location: Left arm, Patient Position: Lying)   Pulse 101   Temp 99.6 °F (37.6 °C) (Oral)   Resp 17   Wt 120.7 kg (266 lb 1.5 oz)   SpO2 97%   BMI 41.68 kg/m²         General:  Moderately built, not in any distress.  Neck:  Supple,   Respiratory: Non-labored,  Lungs are clear to auscultation.    Cardiovascular:  Normal rate, Regular rhythm.   Abdomen:  Soft, Non-tender, Normal bowel sounds.   Muskuloskeletal:  pedal edema +          Laboratory Results   Today's Lab Results :    Recent Results (from the past 24 hours)   POCT glucose    Collection Time: 02/27/25  3:56 PM   Result Value Ref Range    POCT Glucose 273 (H) 70 - 110 mg/dL   POCT glucose    Collection Time: 02/27/25 10:52 PM   Result Value Ref Range    POCT Glucose 258 (H) 70 - 110 mg/dL   POCT glucose    Collection Time: 02/28/25  6:24 AM   Result Value Ref Range    POCT Glucose 166 (H) 70 - 110 mg/dL   CBC Auto Differential    Collection Time: 02/28/25  6:25 AM   Result Value Ref Range    WBC 3.78 (L) 3.90 - 12.70 K/uL    RBC 3.07 (L) 4.60 - 6.20 M/uL    Hemoglobin 7.4 (L) 14.0 - 18.0 g/dL    Hematocrit 25.6 (L) 40.0 - 54.0 %    MCV 83 82 - 98 fL    MCH 24.1 (L) 27.0 - 31.0 pg    MCHC 28.9 (L) 32.0 - 36.0 g/dL    RDW 15.1 (H) 11.5 - 14.5 %    Platelets 195 150 - 450 K/uL    MPV 10.5 9.2 - 12.9 fL    Immature Granulocytes CANCELED 0.0 -  0.5 %    Immature Grans (Abs) CANCELED 0.00 - 0.04 K/uL    nRBC 0 0 /100 WBC    Gran % 69.0 38.0 - 73.0 %    Lymph % 22.0 18.0 - 48.0 %    Mono % 6.0 4.0 - 15.0 %    Eosinophil % 1.0 0.0 - 8.0 %    Basophil % 0.0 0.0 - 1.9 %    Bands 1.0 %    Myelocytes 1.0 %    Platelet Estimate Appears normal     Poik Slight     Ovalocytes Occasional     Differential Method Manual    Basic Metabolic Panel    Collection Time: 02/28/25  6:25 AM   Result Value Ref Range    Sodium 142 136 - 145 mmol/L    Potassium 3.5 3.5 - 5.1 mmol/L    Chloride 110 95 - 110 mmol/L    CO2 20 (L) 23 - 29 mmol/L    Glucose 163 (H) 70 - 110 mg/dL    BUN 50 (H) 8 - 23 mg/dL    Creatinine 3.6 (H) 0.5 - 1.4 mg/dL    Calcium 8.6 (L) 8.7 - 10.5 mg/dL    Anion Gap 12 8 - 16 mmol/L    eGFR 17 (A) >60 mL/min/1.73 m^2   Phosphorus    Collection Time: 02/28/25  6:25 AM   Result Value Ref Range    Phosphorus 3.0 2.7 - 4.5 mg/dL   Magnesium    Collection Time: 02/28/25  6:25 AM   Result Value Ref Range    Magnesium 1.9 1.6 - 2.6 mg/dL   POCT glucose    Collection Time: 02/28/25 12:21 PM   Result Value Ref Range    POCT Glucose 140 (H) 70 - 110 mg/dL       LABS:  Reviewed Yes      Intake/Output Summary (Last 24 hours) at 2/28/2025 1255  Last data filed at 2/28/2025 1227  Gross per 24 hour   Intake --   Output 2200 ml   Net -2200 ml       Assessment and Plan     ANGELITO on CKD 3 secondary to possible ATN from hypotension  - baseline serum creatinine about 1.5 mg/dL, admission creatinine 4.3.   -his creatinine started to improve with good urine output.  Continue IV fluids.  -FENa 2.0%  -Entresto currently on hold due to ANGELITO    Gross hematuria secondary to Ann trauma.  Improving.     S/p LRDKT in 2015, continue immunosuppression with Prograf 4/3, target Prograf level 4-7, prednisone 5 mg daily and CellCept 500 mg twice a day.  -his Prograf level was slightly low but in the acceptable range.  We will repeat his Prograf again in a.m..  His kidney ultrasound did not reveal  any hydro.  His renal artery Doppler did not reveal any stenosis in the main artery.    Hypertension.  His blood pressure is stable.     Hypokalemia.  Improved.     HFrEF.  Last echo reports LV ejection fraction about 35%.  Continue gentle IV fluids and is well compensated.        Anemia.  Likely multifactorial.  Monitor hemoglobin.  PRBC transfusion when indicated.  On oral iron.           ________________________________________________  Yoandy Bennett

## 2025-02-28 NOTE — ASSESSMENT & PLAN NOTE
Possibly related to Ann insertion   Will continue to monitor   should hematuria persists will plan to consult Urology on case  Hematuria improving

## 2025-02-28 NOTE — ASSESSMENT & PLAN NOTE
ANGELITO is likely due to pre-renal azotemia due to dehydration. Baseline creatinine is  1.4 . Most recent creatinine and eGFR are listed below.  Recent Labs     02/26/25  1248 02/27/25  0530 02/28/25  0625   CREATININE 4.3* 4.2* 3.6*   EGFRNORACEVR 14* 14* 17*        Plan  Hold diuretics   Monitor urine output   Urine sodium and creatinine   Consult Nephrology on case   Continue IVF  Patient may need renal biopsy    02/28/2025  Creatinine improving   Continue IVF   Nephrology on case

## 2025-02-28 NOTE — SUBJECTIVE & OBJECTIVE
Interval History:  Patient denies any issues overnight.    Review of Systems   Constitutional:  Positive for fatigue. Negative for fever.   HENT:  Negative for sinus pressure.    Eyes:  Negative for visual disturbance.   Respiratory:  Negative for shortness of breath.    Cardiovascular:  Negative for chest pain.   Gastrointestinal:  Negative for nausea and vomiting.   Genitourinary:  Positive for decreased urine volume, difficulty urinating, dysuria and hematuria.   Musculoskeletal:  Negative for back pain.   Skin:  Negative for rash.   Neurological:  Negative for headaches.   Psychiatric/Behavioral:  Negative for confusion.      Objective:     Vital Signs (Most Recent):  Temp: 99.5 °F (37.5 °C) (02/28/25 1306)  Pulse: 100 (02/28/25 1313)  Resp: 18 (02/28/25 1306)  BP: (!) 122/58 (02/28/25 1306)  SpO2: 96 % (02/28/25 1306) Vital Signs (24h Range):  Temp:  [98.9 °F (37.2 °C)-99.6 °F (37.6 °C)] 99.5 °F (37.5 °C)  Pulse:  [] 100  Resp:  [17-19] 18  SpO2:  [94 %-99 %] 96 %  BP: (122-144)/(58-94) 122/58     Weight: 120.7 kg (266 lb 1.5 oz)  Body mass index is 41.68 kg/m².    Intake/Output Summary (Last 24 hours) at 2/28/2025 1602  Last data filed at 2/28/2025 1227  Gross per 24 hour   Intake --   Output 2200 ml   Net -2200 ml         Physical Exam  Constitutional:       General: He is not in acute distress.     Appearance: He is well-developed. He is obese. He is not diaphoretic.   HENT:      Head: Normocephalic and atraumatic.   Eyes:      Pupils: Pupils are equal, round, and reactive to light.   Cardiovascular:      Rate and Rhythm: Normal rate and regular rhythm.      Heart sounds: Normal heart sounds. No murmur heard.     No friction rub. No gallop.   Pulmonary:      Effort: Pulmonary effort is normal. No respiratory distress.      Breath sounds: Normal breath sounds. No stridor. No wheezing or rales.   Abdominal:      General: Bowel sounds are normal. There is no distension.      Palpations: Abdomen is soft.  There is no mass.      Tenderness: There is no abdominal tenderness. There is no guarding.   Genitourinary:     Comments: Ann in place, hematuria noted  Skin:     General: Skin is warm.      Findings: No erythema.   Neurological:      Mental Status: He is alert and oriented to person, place, and time.             Significant Labs: All pertinent labs within the past 24 hours have been reviewed.    Significant Imaging: I have reviewed all pertinent imaging results/findings within the past 24 hours.

## 2025-02-28 NOTE — PLAN OF CARE
Discussed poc with pt, pt verbalized understanding  Purposeful rounding every 2hours  VS wnl  Cardiac monitoring in use, pt is SAM, tele monitor # [id number]  Blood glucose monitoring   Fall precautions in place, remains injury free  Pain and nausea under control with PRN meds  IVFs  Accurate I&Os  Abx given as prescribed  Bed locked at lowest position  Call light within reach  Chart check complete  Will cont with POC    Problem: Adult Inpatient Plan of Care  Goal: Plan of Care Review  Outcome: Progressing  Goal: Patient-Specific Goal (Individualized)  Outcome: Progressing  Goal: Absence of Hospital-Acquired Illness or Injury  Outcome: Progressing  Goal: Optimal Comfort and Wellbeing  Outcome: Progressing  Goal: Readiness for Transition of Care  Outcome: Progressing     Problem: Bariatric Environmental Safety  Goal: Safety Maintained with Care  Outcome: Progressing     Problem: Diabetes Comorbidity  Goal: Blood Glucose Level Within Targeted Range  Outcome: Progressing     Problem: Acute Kidney Injury/Impairment  Goal: Fluid and Electrolyte Balance  Outcome: Progressing  Goal: Improved Oral Intake  Outcome: Progressing  Goal: Effective Renal Function  Outcome: Progressing     Problem: Skin Injury Risk Increased  Goal: Skin Health and Integrity  Outcome: Progressing     Problem: Infection  Goal: Absence of Infection Signs and Symptoms  Outcome: Progressing

## 2025-02-28 NOTE — PROGRESS NOTES
[facility] Medicine  Progress Note    Patient Name: Mitch Whittaker  MRN: [id number]  Patient Class: IP- Inpatient   Admission Date: 2/26/2025  Length of Stay: 2 days  Attending Physician: Long Payan MD  Primary Care Provider: Valery Caal MD        Subjective     Principal Problem:ANGELITO (acute kidney injury)        HPI:  Patient is a 69-year-old  male with a PMH of CHF, kidney transplant, CAD, DM, DVT who presents to the ED with complaints of weakness.  Patient is a resident at [facility].  He states he was sent here for renal failure.  Patient does report still producing urine however it is decreased.  Does complain of some dysuria.  Reports that oral intake is good.  Denies any fever or chills.  No other issues reported at this time.    In the ED, H&H 8.0/27, K:  2.9, BUN/CR 60/4.3.  ED discussed with Dr. Gonsalez who recommended fluid challenge due to concern with over-diuresis.        Overview/Hospital Course:  02/27/2025  Will start empiric Rocephin for UTI.  Creatinine stable.  Continue IVF.  Nephrology on case.  Patient may need renal biopsy.  02/28/2025  Creatinine trending down.  Will continue IVF.  Gross hematuria improving.  Continue Rocephin for UTI.    Interval History:  Patient denies any issues overnight.    Review of Systems   Constitutional:  Positive for fatigue. Negative for fever.   HENT:  Negative for sinus pressure.    Eyes:  Negative for visual disturbance.   Respiratory:  Negative for shortness of breath.    Cardiovascular:  Negative for chest pain.   Gastrointestinal:  Negative for nausea and vomiting.   Genitourinary:  Positive for decreased urine volume, difficulty urinating, dysuria and hematuria.   Musculoskeletal:  Negative for back pain.   Skin:  Negative for rash.   Neurological:  Negative for headaches.   Psychiatric/Behavioral:  Negative for confusion.      Objective:     Vital Signs (Most Recent):  Temp: 99.5 °F (37.5 °C) (02/28/25 1306)  Pulse:  100 (02/28/25 1313)  Resp: 18 (02/28/25 1306)  BP: (!) 122/58 (02/28/25 1306)  SpO2: 96 % (02/28/25 1306) Vital Signs (24h Range):  Temp:  [98.9 °F (37.2 °C)-99.6 °F (37.6 °C)] 99.5 °F (37.5 °C)  Pulse:  [] 100  Resp:  [17-19] 18  SpO2:  [94 %-99 %] 96 %  BP: (122-144)/(58-94) 122/58     Weight: 120.7 kg (266 lb 1.5 oz)  Body mass index is 41.68 kg/m².    Intake/Output Summary (Last 24 hours) at 2/28/2025 1602  Last data filed at 2/28/2025 1227  Gross per 24 hour   Intake --   Output 2200 ml   Net -2200 ml         Physical Exam  Constitutional:       General: He is not in acute distress.     Appearance: He is well-developed. He is obese. He is not diaphoretic.   HENT:      Head: Normocephalic and atraumatic.   Eyes:      Pupils: Pupils are equal, round, and reactive to light.   Cardiovascular:      Rate and Rhythm: Normal rate and regular rhythm.      Heart sounds: Normal heart sounds. No murmur heard.     No friction rub. No gallop.   Pulmonary:      Effort: Pulmonary effort is normal. No respiratory distress.      Breath sounds: Normal breath sounds. No stridor. No wheezing or rales.   Abdominal:      General: Bowel sounds are normal. There is no distension.      Palpations: Abdomen is soft. There is no mass.      Tenderness: There is no abdominal tenderness. There is no guarding.   Genitourinary:     Comments: Ann in place, hematuria noted  Skin:     General: Skin is warm.      Findings: No erythema.   Neurological:      Mental Status: He is alert and oriented to person, place, and time.             Significant Labs: All pertinent labs within the past 24 hours have been reviewed.    Significant Imaging: I have reviewed all pertinent imaging results/findings within the past 24 hours.    Assessment and Plan     * ANGELITO (acute kidney injury)  ANGELITO is likely due to pre-renal azotemia due to dehydration. Baseline creatinine is  1.4 . Most recent creatinine and eGFR are listed below.  Recent Labs      02/26/25  1248 02/27/25  0530 02/28/25  0625   CREATININE 4.3* 4.2* 3.6*   EGFRNORACEVR 14* 14* 17*        Plan  Hold diuretics   Monitor urine output   Urine sodium and creatinine   Consult Nephrology on case   Continue IVF  Patient may need renal biopsy    02/28/2025  Creatinine improving   Continue IVF   Nephrology on case    UTI (urinary tract infection)  Continue Rocephin   Urine culture pending      Gross hematuria  Possibly related to Ann insertion   Will continue to monitor   should hematuria persists will plan to consult Urology on case  Hematuria improving      Deep vein thrombosis (DVT) of lower extremity  Due to hematuria   Will hold anticoagulation for now      Chronic combined systolic and diastolic congestive heart failure  Patient has Combined Systolic and Diastolic heart failure that is Chronic. On presentation their CHF was well compensated. Most recent BNP and echo results are listed below.  Recent Labs     02/26/25  1248   *     Latest ECHO  Results for orders placed during the hospital encounter of 11/26/24    Echo    Interpretation Summary    Left Ventricle: The left ventricle is normal in size. Normal wall thickness. There is concentric hypertrophy. Normal wall motion. Septal motion is consistent with bundle branch block. There is moderately reduced systolic function. Ejection fraction is approximately 35%. Grade I diastolic dysfunction.    Right Ventricle: Normal right ventricular cavity size. Wall thickness is normal. Systolic function is normal.    IVC/SVC: Normal venous pressure at 3 mmHg.    Current Heart Failure Medications  metoprolol succinate (TOPROL-XL) 24 hr tablet 25 mg, Daily, Oral  hydrALAZINE injection 10 mg, Every 6 hours PRN, Intravenous    Plan  - Monitor strict I&Os and daily weights.    - Place on telemetry  - Low sodium diet  - Place on fluid restriction of 1.5 L.   - Cardiology has not been consulted  - The patient's volume status is at their baseline  - patient  "appears euvolemic        Type II diabetes mellitus with renal manifestations  Patient's FSGs are controlled on current medication regimen.  Last A1c reviewed-   Lab Results   Component Value Date    HGBA1C 5.7 (H) 12/17/2024     Most recent fingerstick glucose reviewed- No results for input(s): "POCTGLUCOSE" in the last 24 hours.  Current correctional scale  Medium  Maintain anti-hyperglycemic dose as follows-   Antihyperglycemics (From admission, onward)      Start     Stop Route Frequency Ordered    02/26/25 2100  insulin glargine U-100 (Lantus) pen 30 Units         -- SubQ Nightly 02/26/25 1451    02/26/25 1551  insulin aspart U-100 pen 0-10 Units         -- SubQ Before meals & nightly PRN 02/26/25 1451          Hold Oral hypoglycemics while patient is in the hospital.    Chronic immunosuppression with Prograf, MMF and prednisone  Will hold CellCept for now   Continue Prograf   Check Prograf levels   Continue prednisone      Class 3 severe obesity due to excess calories with body mass index (BMI) of 40.0 to 44.9 in adult  Body mass index is 41.68 kg/m². Morbid obesity complicates all aspects of disease management from diagnostic modalities to treatment. Weight loss encouraged and health benefits explained to patient.           VTE Risk Mitigation (From admission, onward)           Ordered     Place sequential compression device  Until discontinued         02/26/25 1607                    Discharge Planning   GRETEL:      Code Status: Full Code   Medical Readiness for Discharge Date:                            Long Payan MD  Department of Hospital Medicine   [facility] - Med Surg    "

## 2025-02-28 NOTE — PLAN OF CARE
Discussed poc with pt, pt verbalized understanding  Purposeful rounding every 2hours  VS wnl  Cardiac monitoring in use, pt is SAM, tele monitor # [id number]  Blood glucose monitoring   Fall precautions in place, remains injury free  Pain and nausea under control with PRN meds  IVFs  Accurate I&Os  Abx given as prescribed  Bed locked at lowest position  Call light within reach  Chart check complete  Will cont with POC    Problem: Adult Inpatient Plan of Care  Goal: Plan of Care Review  Outcome: Progressing  Goal: Patient-Specific Goal (Individualized)  Outcome: Progressing  Goal: Absence of Hospital-Acquired Illness or Injury  Outcome: Progressing  Goal: Optimal Comfort and Wellbeing  Outcome: Progressing  Goal: Readiness for Transition of Care  Outcome: Progressing     Problem: Bariatric Environmental Safety  Goal: Safety Maintained with Care  Outcome: Progressing     Problem: Diabetes Comorbidity  Goal: Blood Glucose Level Within Targeted Range  Outcome: Progressing     Problem: Acute Kidney Injury/Impairment  Goal: Fluid and Electrolyte Balance  Outcome: Progressing  Goal: Improved Oral Intake  Outcome: Progressing  Goal: Effective Renal Function  Outcome: Progressing     Problem: Skin Injury Risk Increased  Goal: Skin Health and Integrity  Outcome: Progressing

## 2025-03-01 PROBLEM — D63.8 ANEMIA, CHRONIC DISEASE: Status: ACTIVE | Noted: 2025-03-01

## 2025-03-01 LAB
ABO + RH BLD: NORMAL
ANION GAP SERPL CALC-SCNC: 11 MMOL/L (ref 8–16)
BASOPHILS NFR BLD: 1 % (ref 0–1.9)
BLD GP AB SCN CELLS X3 SERPL QL: NORMAL
BLD PROD TYP BPU: NORMAL
BLOOD UNIT EXPIRATION DATE: NORMAL
BLOOD UNIT TYPE CODE: 7300
BLOOD UNIT TYPE: NORMAL
BUN SERPL-MCNC: 43 MG/DL (ref 8–23)
CALCIUM SERPL-MCNC: 9 MG/DL (ref 8.7–10.5)
CHLORIDE SERPL-SCNC: 111 MMOL/L (ref 95–110)
CO2 SERPL-SCNC: 20 MMOL/L (ref 23–29)
CODING SYSTEM: NORMAL
CREAT SERPL-MCNC: 2.6 MG/DL (ref 0.5–1.4)
CROSSMATCH INTERPRETATION: NORMAL
DIFFERENTIAL METHOD BLD: ABNORMAL
DISPENSE STATUS: NORMAL
EOSINOPHIL NFR BLD: 1 % (ref 0–8)
ERYTHROCYTE [DISTWIDTH] IN BLOOD BY AUTOMATED COUNT: 15 % (ref 11.5–14.5)
EST. GFR  (NO RACE VARIABLE): 26 ML/MIN/1.73 M^2
GLUCOSE SERPL-MCNC: 92 MG/DL (ref 70–110)
HCT VFR BLD AUTO: 25.9 % (ref 40–54)
HGB BLD-MCNC: 7.3 G/DL (ref 14–18)
IMM GRANULOCYTES # BLD AUTO: ABNORMAL K/UL (ref 0–0.04)
IMM GRANULOCYTES NFR BLD AUTO: ABNORMAL % (ref 0–0.5)
LYMPHOCYTES NFR BLD: 14 % (ref 18–48)
MCH RBC QN AUTO: 23.8 PG (ref 27–31)
MCHC RBC AUTO-ENTMCNC: 28.2 G/DL (ref 32–36)
MCV RBC AUTO: 84 FL (ref 82–98)
MONOCYTES NFR BLD: 17 % (ref 4–15)
NEUTROPHILS NFR BLD: 67 % (ref 38–73)
NRBC BLD-RTO: 0 /100 WBC
NUM UNITS TRANS PACKED RBC: NORMAL
OVALOCYTES BLD QL SMEAR: ABNORMAL
PLATELET # BLD AUTO: 195 K/UL (ref 150–450)
PLATELET BLD QL SMEAR: ABNORMAL
PMV BLD AUTO: 11.1 FL (ref 9.2–12.9)
POCT GLUCOSE: 221 MG/DL (ref 70–110)
POCT GLUCOSE: 256 MG/DL (ref 70–110)
POCT GLUCOSE: 90 MG/DL (ref 70–110)
POCT GLUCOSE: 98 MG/DL (ref 70–110)
POTASSIUM SERPL-SCNC: 3.3 MMOL/L (ref 3.5–5.1)
RBC # BLD AUTO: 3.07 M/UL (ref 4.6–6.2)
SCHISTOCYTES BLD QL SMEAR: PRESENT
SODIUM SERPL-SCNC: 142 MMOL/L (ref 136–145)
SPECIMEN OUTDATE: NORMAL
SPHEROCYTES BLD QL SMEAR: ABNORMAL
WBC # BLD AUTO: 3.73 K/UL (ref 3.9–12.7)

## 2025-03-01 PROCEDURE — 25000242 PHARM REV CODE 250 ALT 637 W/ HCPCS: Performed by: FAMILY MEDICINE

## 2025-03-01 PROCEDURE — 25000003 PHARM REV CODE 250: Performed by: FAMILY MEDICINE

## 2025-03-01 PROCEDURE — 80048 BASIC METABOLIC PNL TOTAL CA: CPT | Performed by: FAMILY MEDICINE

## 2025-03-01 PROCEDURE — P9016 RBC LEUKOCYTES REDUCED: HCPCS | Performed by: FAMILY MEDICINE

## 2025-03-01 PROCEDURE — 86901 BLOOD TYPING SEROLOGIC RH(D): CPT | Performed by: FAMILY MEDICINE

## 2025-03-01 PROCEDURE — 36415 COLL VENOUS BLD VENIPUNCTURE: CPT | Performed by: FAMILY MEDICINE

## 2025-03-01 PROCEDURE — 63600175 PHARM REV CODE 636 W HCPCS: Performed by: FAMILY MEDICINE

## 2025-03-01 PROCEDURE — 25000003 PHARM REV CODE 250: Performed by: INTERNAL MEDICINE

## 2025-03-01 PROCEDURE — 94640 AIRWAY INHALATION TREATMENT: CPT

## 2025-03-01 PROCEDURE — 21400001 HC TELEMETRY ROOM

## 2025-03-01 PROCEDURE — 63600175 PHARM REV CODE 636 W HCPCS: Performed by: INTERNAL MEDICINE

## 2025-03-01 PROCEDURE — 85007 BL SMEAR W/DIFF WBC COUNT: CPT | Performed by: FAMILY MEDICINE

## 2025-03-01 PROCEDURE — 63600175 PHARM REV CODE 636 W HCPCS: Mod: JZ,TB | Performed by: FAMILY MEDICINE

## 2025-03-01 PROCEDURE — 85027 COMPLETE CBC AUTOMATED: CPT | Performed by: FAMILY MEDICINE

## 2025-03-01 PROCEDURE — 99232 SBSQ HOSP IP/OBS MODERATE 35: CPT | Mod: ,,, | Performed by: INTERNAL MEDICINE

## 2025-03-01 PROCEDURE — 86920 COMPATIBILITY TEST SPIN: CPT | Performed by: FAMILY MEDICINE

## 2025-03-01 PROCEDURE — 94761 N-INVAS EAR/PLS OXIMETRY MLT: CPT

## 2025-03-01 PROCEDURE — 36430 TRANSFUSION BLD/BLD COMPNT: CPT

## 2025-03-01 PROCEDURE — 30233N1 TRANSFUSION OF NONAUTOLOGOUS RED BLOOD CELLS INTO PERIPHERAL VEIN, PERCUTANEOUS APPROACH: ICD-10-PCS | Performed by: FAMILY MEDICINE

## 2025-03-01 RX ORDER — MUPIROCIN 20 MG/G
OINTMENT TOPICAL 2 TIMES DAILY
Status: COMPLETED | OUTPATIENT
Start: 2025-03-01 | End: 2025-03-06

## 2025-03-01 RX ORDER — ALBUTEROL SULFATE 0.83 MG/ML
2.5 SOLUTION RESPIRATORY (INHALATION)
Status: DISCONTINUED | OUTPATIENT
Start: 2025-03-01 | End: 2025-03-01

## 2025-03-01 RX ORDER — BUMETANIDE 0.25 MG/ML
1 INJECTION, SOLUTION INTRAMUSCULAR; INTRAVENOUS ONCE
Status: COMPLETED | OUTPATIENT
Start: 2025-03-01 | End: 2025-03-01

## 2025-03-01 RX ORDER — IPRATROPIUM BROMIDE AND ALBUTEROL SULFATE 2.5; .5 MG/3ML; MG/3ML
3 SOLUTION RESPIRATORY (INHALATION)
Status: COMPLETED | OUTPATIENT
Start: 2025-03-01 | End: 2025-03-02

## 2025-03-01 RX ORDER — HYDROCODONE BITARTRATE AND ACETAMINOPHEN 5; 325 MG/1; MG/1
1 TABLET ORAL EVERY 8 HOURS PRN
Refills: 0 | Status: DISCONTINUED | OUTPATIENT
Start: 2025-03-01 | End: 2025-03-12 | Stop reason: HOSPADM

## 2025-03-01 RX ORDER — HYDROCODONE BITARTRATE AND ACETAMINOPHEN 500; 5 MG/1; MG/1
TABLET ORAL
Status: DISCONTINUED | OUTPATIENT
Start: 2025-03-01 | End: 2025-03-12 | Stop reason: HOSPADM

## 2025-03-01 RX ORDER — POTASSIUM CHLORIDE 20 MEQ/1
40 TABLET, EXTENDED RELEASE ORAL ONCE
Status: COMPLETED | OUTPATIENT
Start: 2025-03-01 | End: 2025-03-01

## 2025-03-01 RX ADMIN — HYDROCODONE BITARTRATE AND ACETAMINOPHEN 1 TABLET: 5; 325 TABLET ORAL at 09:03

## 2025-03-01 RX ADMIN — POTASSIUM CHLORIDE 40 MEQ: 1500 TABLET, EXTENDED RELEASE ORAL at 03:03

## 2025-03-01 RX ADMIN — METOPROLOL SUCCINATE 25 MG: 25 TABLET, EXTENDED RELEASE ORAL at 09:03

## 2025-03-01 RX ADMIN — INSULIN ASPART 4 UNITS: 100 INJECTION, SOLUTION INTRAVENOUS; SUBCUTANEOUS at 05:03

## 2025-03-01 RX ADMIN — MUPIROCIN: 20 OINTMENT TOPICAL at 08:03

## 2025-03-01 RX ADMIN — FERROUS SULFATE TAB 325 MG (65 MG ELEMENTAL FE) 1 EACH: 325 (65 FE) TAB at 09:03

## 2025-03-01 RX ADMIN — SODIUM CHLORIDE: 9 INJECTION, SOLUTION INTRAVENOUS at 01:03

## 2025-03-01 RX ADMIN — INSULIN GLARGINE 30 UNITS: 100 INJECTION, SOLUTION SUBCUTANEOUS at 08:03

## 2025-03-01 RX ADMIN — BUMETANIDE 1 MG: 0.25 INJECTION INTRAMUSCULAR; INTRAVENOUS at 06:03

## 2025-03-01 RX ADMIN — CEFTRIAXONE 1 G: 1 INJECTION, POWDER, FOR SOLUTION INTRAMUSCULAR; INTRAVENOUS at 03:03

## 2025-03-01 RX ADMIN — IPRATROPIUM BROMIDE AND ALBUTEROL SULFATE 3 ML: 2.5; .5 SOLUTION RESPIRATORY (INHALATION) at 07:03

## 2025-03-01 RX ADMIN — PREDNISONE 5 MG: 5 TABLET ORAL at 09:03

## 2025-03-01 RX ADMIN — MYCOPHENOLATE MOFETIL 500 MG: 500 TABLET, FILM COATED ORAL at 08:03

## 2025-03-01 RX ADMIN — CALCITRIOL CAPSULES 0.25 MCG 0.25 MCG: 0.25 CAPSULE ORAL at 09:03

## 2025-03-01 RX ADMIN — MYCOPHENOLATE MOFETIL 500 MG: 500 TABLET, FILM COATED ORAL at 09:03

## 2025-03-01 RX ADMIN — TACROLIMUS 3 MG: 1 CAPSULE ORAL at 05:03

## 2025-03-01 RX ADMIN — TACROLIMUS 4 MG: 1 CAPSULE ORAL at 09:03

## 2025-03-01 RX ADMIN — IPRATROPIUM BROMIDE AND ALBUTEROL SULFATE 3 ML: 2.5; .5 SOLUTION RESPIRATORY (INHALATION) at 03:03

## 2025-03-01 NOTE — ASSESSMENT & PLAN NOTE
Body mass index is 36.09 kg/m². Morbid obesity complicates all aspects of disease management from diagnostic modalities to treatment. Weight loss encouraged and health benefits explained to patient.

## 2025-03-01 NOTE — ASSESSMENT & PLAN NOTE
Patient's FSGs are controlled on current medication regimen.  Last A1c reviewed-   Lab Results   Component Value Date    HGBA1C 5.7 (H) 12/17/2024     Most recent fingerstick glucose reviewed-   Recent Labs   Lab 02/28/25  1635 02/28/25  2021 03/01/25  0508 03/01/25  1141   POCTGLUCOSE 186* 224* 98 90     Current correctional scale  Medium  Maintain anti-hyperglycemic dose as follows-   Antihyperglycemics (From admission, onward)    Start     Stop Route Frequency Ordered    02/26/25 2100  insulin glargine U-100 (Lantus) pen 30 Units         -- SubQ Nightly 02/26/25 1451    02/26/25 1551  insulin aspart U-100 pen 0-10 Units         -- SubQ Before meals & nightly PRN 02/26/25 1451        Hold Oral hypoglycemics while patient is in the hospital.

## 2025-03-01 NOTE — PLAN OF CARE
Discussed poc with pt, pt verbalized understanding  Purposeful rounding every 2hours    Patient to receive 1 unit of blood today.   Ann removed today.      VS wnl  Cardiac monitoring in use, tele monitor #[id number]  Fall precautions in place, remains injury free  Family at bedside.       Accurate I&Os  Bed locked at lowest position  Call light within reach     Chart check complete  Will cont with POC

## 2025-03-01 NOTE — ASSESSMENT & PLAN NOTE
Possibly related to Gan insertion   Will continue to monitor   should hematuria persists will plan to consult Urology on case  Hematuria improving  Discontinue gan  Agreeable to blood transfusion

## 2025-03-01 NOTE — PROGRESS NOTES
[facility] Medicine  Progress Note    Patient Name: Mitch Whittaker  MRN: [id number]  Patient Class: IP- Inpatient   Admission Date: 2/26/2025  Length of Stay: 3 days  Attending Physician: Lindsey Ferreira MD  Primary Care Provider: Valery Caal MD        Subjective     Principal Problem:ANGELITO (acute kidney injury)        HPI:  Patient is a 69-year-old  male with a PMH of CHF, kidney transplant, CAD, DM, DVT who presents to the ED with complaints of weakness.  Patient is a resident at [facility].  He states he was sent here for renal failure.  Patient does report still producing urine however it is decreased.  Does complain of some dysuria.  Reports that oral intake is good.  Denies any fever or chills.  No other issues reported at this time.    In the ED, H&H 8.0/27, K:  2.9, BUN/CR 60/4.3.  ED discussed with Dr. Gonsalez who recommended fluid challenge due to concern with over-diuresis.        Overview/Hospital Course:  02/27/2025  Will start empiric Rocephin for UTI.  Creatinine stable.  Continue IVF.  Nephrology on case.  Patient may need renal biopsy.  02/28/2025  Creatinine trending down.  Will continue IVF.  Gross hematuria improving.  Continue Rocephin for UTI.  3/1 creatinine continues to improve. Discontinue intravenous fluids per nephrology. Discontinue gan per nephrology, monitor for urinary retention. Discussed blood transfusion with nephrology and patient also agreeable. No transfusion reaction in the past.    Interval History: See hospital course for today       Review of Systems   Constitutional:  Positive for activity change and fatigue.   Respiratory:  Positive for cough. Negative for shortness of breath.    Allergic/Immunologic: Positive for immunocompromised state.   Neurological:  Positive for weakness.   Psychiatric/Behavioral:  Positive for decreased concentration. Negative for agitation, behavioral problems, confusion and dysphoric mood. The patient is not  nervous/anxious.      Objective:     Vital Signs (Most Recent):  Temp: 98.6 °F (37 °C) (03/01/25 1221)  Pulse: 98 (03/01/25 1221)  Resp: 16 (03/01/25 1221)  BP: (!) 118/53 (03/01/25 1221)  SpO2: 97 % (03/01/25 1221) Vital Signs (24h Range):  Temp:  [97.9 °F (36.6 °C)-99.6 °F (37.6 °C)] 98.6 °F (37 °C)  Pulse:  [] 98  Resp:  [16-18] 16  SpO2:  [96 %-98 %] 97 %  BP: (118-142)/(53-70) 118/53     Weight: 120.7 kg (266 lb 1.5 oz)  Body mass index is 36.09 kg/m².    Intake/Output Summary (Last 24 hours) at 3/1/2025 1417  Last data filed at 3/1/2025 0625  Gross per 24 hour   Intake --   Output 1800 ml   Net -1800 ml         Physical Exam  Vitals and nursing note reviewed. Exam conducted with a chaperone present (neph).   Constitutional:       General: He is not in acute distress.     Appearance: He is obese. He is ill-appearing. He is not toxic-appearing.   HENT:      Head: Normocephalic and atraumatic.   Cardiovascular:      Rate and Rhythm: Normal rate.   Pulmonary:      Effort: Pulmonary effort is normal. No respiratory distress.   Abdominal:      Palpations: Abdomen is soft.      Tenderness: There is no abdominal tenderness.   Genitourinary:     Comments: Ann with hematuria   Musculoskeletal:      Right lower leg: Edema present.      Left lower leg: Edema present.   Skin:     General: Skin is warm.   Neurological:      Mental Status: He is alert. Mental status is at baseline.      Motor: Weakness present.             Significant Labs: All pertinent labs within the past 24 hours have been reviewed.    CBC:   Recent Labs   Lab 02/28/25  0625 03/01/25  0441   WBC 3.78* 3.73*   HGB 7.4* 7.3*   HCT 25.6* 25.9*    195     CMP:   Recent Labs   Lab 02/28/25 0625 03/01/25  0441    142   K 3.5 3.3*    111*   CO2 20* 20*   * 92   BUN 50* 43*   CREATININE 3.6* 2.6*   CALCIUM 8.6* 9.0   ANIONGAP 12 11     POCT Glucose:   Recent Labs   Lab 02/28/25 2021 03/01/25  0508 03/01/25  1141   POCTGLUCOSE  224* 98 90       Significant Imaging: I have reviewed all pertinent imaging results/findings within the past 24 hours.    Assessment and Plan     * ANGELITO (acute kidney injury)  ANGELITO is likely due to pre-renal azotemia due to dehydration. Baseline creatinine is  1.4 . Most recent creatinine and eGFR are listed below.  Recent Labs     02/27/25  0530 02/28/25  0625 03/01/25  0441   CREATININE 4.2* 3.6* 2.6*   EGFRNORACEVR 14* 17* 26*        Plan  Hold diuretics   Monitor urine output   Urine sodium and creatinine   Consult Nephrology on case   Continue IVF  Patient may need renal biopsy    03/01/2025  Creatinine improving   Discontinue intravenous fluids per nephrology   Nephrology on case    Anemia, chronic disease  Anemia is likely due to acute blood loss which was from hematuria and chronic disease due to Chronic Kidney Disease. Most recent hemoglobin and hematocrit are listed below.  Recent Labs     02/27/25  0530 02/28/25  0625 03/01/25  0441   HGB 7.0* 7.4* 7.3*   HCT 23.9* 25.6* 25.9*     Plan  - Monitor serial CBC: Daily  - Transfuse PRBC if patient becomes hemodynamically unstable, symptomatic or H/H drops below 7/21.  - Patient has received 1 units of PRBCs on 3/1/25  - Patient's anemia is currently stable  - monitor     UTI (urinary tract infection)  Continue Rocephin       Gross hematuria  Possibly related to Gan insertion   Will continue to monitor   should hematuria persists will plan to consult Urology on case  Hematuria improving  Discontinue gan  Agreeable to blood transfusion    Chronic cough  Follows pulmonology outpatient  Former smoker  Complains of dry cough but no dyspnea  Schedule breathing treatments and monitor response      Deep vein thrombosis (DVT) of lower extremity  Due to hematuria   Will hold anticoagulation for now      Chronic combined systolic and diastolic congestive heart failure  Patient has Combined Systolic and Diastolic heart failure that is Chronic. On presentation their CHF  "was well compensated. Most recent BNP and echo results are listed below.  No results for input(s): "BNP" in the last 72 hours.    Latest ECHO  Results for orders placed during the hospital encounter of 11/26/24    Echo    Interpretation Summary    Left Ventricle: The left ventricle is normal in size. Normal wall thickness. There is concentric hypertrophy. Normal wall motion. Septal motion is consistent with bundle branch block. There is moderately reduced systolic function. Ejection fraction is approximately 35%. Grade I diastolic dysfunction.    Right Ventricle: Normal right ventricular cavity size. Wall thickness is normal. Systolic function is normal.    IVC/SVC: Normal venous pressure at 3 mmHg.    Current Heart Failure Medications  metoprolol succinate (TOPROL-XL) 24 hr tablet 25 mg, Daily, Oral  hydrALAZINE injection 10 mg, Every 6 hours PRN, Intravenous  bumetanide injection 1 mg, Once, Intravenous    Plan  - Monitor strict I&Os and daily weights.    - Place on telemetry  - Low sodium diet  - Place on fluid restriction of 1.5 L.   - Cardiology has not been consulted  - The patient's volume status is at their baseline  - patient appears euvolemic    One time bumex after blood transfusion      Type II diabetes mellitus with renal manifestations  Patient's FSGs are controlled on current medication regimen.  Last A1c reviewed-   Lab Results   Component Value Date    HGBA1C 5.7 (H) 12/17/2024     Most recent fingerstick glucose reviewed-   Recent Labs   Lab 02/28/25  1635 02/28/25 2021 03/01/25  0508 03/01/25  1141   POCTGLUCOSE 186* 224* 98 90     Current correctional scale  Medium  Maintain anti-hyperglycemic dose as follows-   Antihyperglycemics (From admission, onward)      Start     Stop Route Frequency Ordered    02/26/25 2100  insulin glargine U-100 (Lantus) pen 30 Units         -- SubQ Nightly 02/26/25 1451    02/26/25 1551  insulin aspart U-100 pen 0-10 Units         -- SubQ Before meals & nightly PRN " 02/26/25 1451          Hold Oral hypoglycemics while patient is in the hospital.    Chronic immunosuppression with Prograf, MMF and prednisone  Will hold CellCept for now   Continue Prograf   Prograf levels 3.4  Continue prednisone      Class 3 severe obesity due to excess calories with body mass index (BMI) of 40.0 to 44.9 in adult  Body mass index is 36.09 kg/m². Morbid obesity complicates all aspects of disease management from diagnostic modalities to treatment. Weight loss encouraged and health benefits explained to patient.           VTE Risk Mitigation (From admission, onward)           Ordered     Place sequential compression device  Until discontinued         02/26/25 1607                    Discharge Planning   GRETEL:      Code Status: Full Code   Medical Readiness for Discharge Date:                            Lindsey Ferreira MD  Department of Hospital Medicine   [facility] - Med Surg

## 2025-03-01 NOTE — SUBJECTIVE & OBJECTIVE
Interval History: See hospital course for today       Review of Systems   Constitutional:  Positive for activity change and fatigue.   Respiratory:  Positive for cough. Negative for shortness of breath.    Allergic/Immunologic: Positive for immunocompromised state.   Neurological:  Positive for weakness.   Psychiatric/Behavioral:  Positive for decreased concentration. Negative for agitation, behavioral problems, confusion and dysphoric mood. The patient is not nervous/anxious.      Objective:     Vital Signs (Most Recent):  Temp: 98.6 °F (37 °C) (03/01/25 1221)  Pulse: 98 (03/01/25 1221)  Resp: 16 (03/01/25 1221)  BP: (!) 118/53 (03/01/25 1221)  SpO2: 97 % (03/01/25 1221) Vital Signs (24h Range):  Temp:  [97.9 °F (36.6 °C)-99.6 °F (37.6 °C)] 98.6 °F (37 °C)  Pulse:  [] 98  Resp:  [16-18] 16  SpO2:  [96 %-98 %] 97 %  BP: (118-142)/(53-70) 118/53     Weight: 120.7 kg (266 lb 1.5 oz)  Body mass index is 36.09 kg/m².    Intake/Output Summary (Last 24 hours) at 3/1/2025 1417  Last data filed at 3/1/2025 0625  Gross per 24 hour   Intake --   Output 1800 ml   Net -1800 ml         Physical Exam  Vitals and nursing note reviewed. Exam conducted with a chaperone present (neph).   Constitutional:       General: He is not in acute distress.     Appearance: He is obese. He is ill-appearing. He is not toxic-appearing.   HENT:      Head: Normocephalic and atraumatic.   Cardiovascular:      Rate and Rhythm: Normal rate.   Pulmonary:      Effort: Pulmonary effort is normal. No respiratory distress.   Abdominal:      Palpations: Abdomen is soft.      Tenderness: There is no abdominal tenderness.   Genitourinary:     Comments: Ann with hematuria   Musculoskeletal:      Right lower leg: Edema present.      Left lower leg: Edema present.   Skin:     General: Skin is warm.   Neurological:      Mental Status: He is alert. Mental status is at baseline.      Motor: Weakness present.             Significant Labs: All pertinent labs  within the past 24 hours have been reviewed.    CBC:   Recent Labs   Lab 02/28/25  0625 03/01/25  0441   WBC 3.78* 3.73*   HGB 7.4* 7.3*   HCT 25.6* 25.9*    195     CMP:   Recent Labs   Lab 02/28/25  0625 03/01/25  0441    142   K 3.5 3.3*    111*   CO2 20* 20*   * 92   BUN 50* 43*   CREATININE 3.6* 2.6*   CALCIUM 8.6* 9.0   ANIONGAP 12 11     POCT Glucose:   Recent Labs   Lab 02/28/25 2021 03/01/25  0508 03/01/25  1141   POCTGLUCOSE 224* 98 90       Significant Imaging: I have reviewed all pertinent imaging results/findings within the past 24 hours.

## 2025-03-01 NOTE — ASSESSMENT & PLAN NOTE
"Patient has Combined Systolic and Diastolic heart failure that is Chronic. On presentation their CHF was well compensated. Most recent BNP and echo results are listed below.  No results for input(s): "BNP" in the last 72 hours.    Latest ECHO  Results for orders placed during the hospital encounter of 11/26/24    Echo    Interpretation Summary    Left Ventricle: The left ventricle is normal in size. Normal wall thickness. There is concentric hypertrophy. Normal wall motion. Septal motion is consistent with bundle branch block. There is moderately reduced systolic function. Ejection fraction is approximately 35%. Grade I diastolic dysfunction.    Right Ventricle: Normal right ventricular cavity size. Wall thickness is normal. Systolic function is normal.    IVC/SVC: Normal venous pressure at 3 mmHg.    Current Heart Failure Medications  metoprolol succinate (TOPROL-XL) 24 hr tablet 25 mg, Daily, Oral  hydrALAZINE injection 10 mg, Every 6 hours PRN, Intravenous  bumetanide injection 1 mg, Once, Intravenous    Plan  - Monitor strict I&Os and daily weights.    - Place on telemetry  - Low sodium diet  - Place on fluid restriction of 1.5 L.   - Cardiology has not been consulted  - The patient's volume status is at their baseline  - patient appears euvolemic    One time bumex after blood transfusion    "

## 2025-03-01 NOTE — ASSESSMENT & PLAN NOTE
ANGELITO is likely due to pre-renal azotemia due to dehydration. Baseline creatinine is  1.4 . Most recent creatinine and eGFR are listed below.  Recent Labs     02/27/25  0530 02/28/25  0625 03/01/25  0441   CREATININE 4.2* 3.6* 2.6*   EGFRNORACEVR 14* 17* 26*        Plan  Hold diuretics   Monitor urine output   Urine sodium and creatinine   Consult Nephrology on case   Continue IVF  Patient may need renal biopsy    03/01/2025  Creatinine improving   Discontinue intravenous fluids per nephrology   Nephrology on case

## 2025-03-01 NOTE — PROGRESS NOTES
Nephrology Progress Note     History of Present Illness     Mitch Whittaker   is a 69 y.o. 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  Patient has a routine nephrology follow up visit today and part of his appointment he had lab work done that revealed acute on chronic renal failure, patient was advised to come to the emergency room for further evaluation.  According to the family patient has been in the [facility] rehab facility for some lower extremity weakness.  Admission labs revealed a BUN of 60 and a serum creatinine of 4.3 mg/dL.  Patient denies any pain in his abdomen or tenderness over his allograft.  He has a Ann catheter was placed in the ER that has bloody urine.  Blood pressure was slightly low on presentation which improved in the ER after fluid bolus.  We are consulted for acute on chronic renal failure       Interval History   Overnight/currently:  Patient is complaining of, but denies any shortness of breath.       Allergies:    is allergic to lisinopril, actos  [pioglitazone], and metformin.    Current medications:   Scheduled Meds:   calcitRIOL  0.25 mcg Oral Daily    cefTRIAXone (Rocephin) IV (PEDS and ADULTS)  1 g Intravenous Q24H    ferrous sulfate  1 tablet Oral Daily    insulin glargine U-100  30 Units Subcutaneous QHS    metoprolol succinate  25 mg Oral Daily    mycophenolate  500 mg Oral BID    predniSONE  5 mg Oral Daily    tacrolimus  4 mg Oral Daily AM    And    tacrolimus  3 mg Oral Daily PM     Continuous Infusions:   0.9% NaCl   Intravenous Continuous 75 mL/hr at 03/01/25 0150 New Bag at 03/01/25 0150     PRN Meds:.  Current Facility-Administered Medications:      acetaminophen, 650 mg, Oral, Q6H PRN    amitriptyline, 25 mg, Oral, Nightly PRN    dextromethorphan-guaiFENesin  mg/5 ml, 5 mL, Oral, Q4H PRN    dextrose 50%, 12.5 g, Intravenous, PRN    dextrose 50%, 25 g, Intravenous, PRN    glucagon (human recombinant), 1 mg, Intramuscular, PRN    glucose, 16 g, Oral, PRN    glucose, 24 g, Oral, PRN    hydrALAZINE, 10 mg, Intravenous, Q6H PRN    HYDROcodone-acetaminophen, 1 tablet, Oral, Q6H PRN    insulin aspart U-100, 0-10 Units, Subcutaneous, QID (AC + HS) PRN    ondansetron, 4 mg, Oral, Q6H PRN     Physical Examination     VS/Measurements    /63   Pulse 98   Temp 97.9 °F (36.6 °C)   Resp 16   Ht 6' (1.829 m)   Wt 120.7 kg (266 lb 1.5 oz)   SpO2 97%   BMI 36.09 kg/m²         General:  Moderately built, not in any distress.  Neck:  Supple,   Respiratory: Non-labored,  Lungs are clear to auscultation.    Cardiovascular:  Normal rate, Regular rhythm.   Abdomen:  Soft, Non-tender, Normal bowel sounds.   Muskuloskeletal:  pedal edema +          Laboratory Results   Today's Lab Results :    Recent Results (from the past 24 hours)   POCT glucose    Collection Time: 02/28/25 12:21 PM   Result Value Ref Range    POCT Glucose 140 (H) 70 - 110 mg/dL   POCT glucose    Collection Time: 02/28/25  4:35 PM   Result Value Ref Range    POCT Glucose 186 (H) 70 - 110 mg/dL   POCT glucose    Collection Time: 02/28/25  8:21 PM   Result Value Ref Range    POCT Glucose 224 (H) 70 - 110 mg/dL   CBC Auto Differential    Collection Time: 03/01/25  4:41 AM   Result Value Ref Range    WBC 3.73 (L) 3.90 - 12.70 K/uL    RBC 3.07 (L) 4.60 - 6.20 M/uL    Hemoglobin 7.3 (L) 14.0 - 18.0 g/dL    Hematocrit 25.9 (L) 40.0 - 54.0 %    MCV 84 82 - 98 fL    MCH 23.8 (L) 27.0 - 31.0 pg    MCHC 28.2 (L) 32.0 - 36.0 g/dL    RDW 15.0 (H) 11.5 - 14.5 %    Platelets 195 150 - 450 K/uL    MPV 11.1 9.2 - 12.9 fL    Immature Granulocytes CANCELED 0.0 - 0.5 %    Immature Grans (Abs) CANCELED 0.00 - 0.04 K/uL     nRBC 0 0 /100 WBC    Gran % 67.0 38.0 - 73.0 %    Lymph % 14.0 (L) 18.0 - 48.0 %    Mono % 17.0 (H) 4.0 - 15.0 %    Eosinophil % 1.0 0.0 - 8.0 %    Basophil % 1.0 0.0 - 1.9 %    Platelet Estimate Appears normal     Ovalocytes Occasional     Spherocytes Occasional     Schistocytes Present     Differential Method Manual    Basic Metabolic Panel    Collection Time: 03/01/25  4:41 AM   Result Value Ref Range    Sodium 142 136 - 145 mmol/L    Potassium 3.3 (L) 3.5 - 5.1 mmol/L    Chloride 111 (H) 95 - 110 mmol/L    CO2 20 (L) 23 - 29 mmol/L    Glucose 92 70 - 110 mg/dL    BUN 43 (H) 8 - 23 mg/dL    Creatinine 2.6 (H) 0.5 - 1.4 mg/dL    Calcium 9.0 8.7 - 10.5 mg/dL    Anion Gap 11 8 - 16 mmol/L    eGFR 26 (A) >60 mL/min/1.73 m^2   POCT glucose    Collection Time: 03/01/25  5:08 AM   Result Value Ref Range    POCT Glucose 98 70 - 110 mg/dL       LABS:  Reviewed Yes      Intake/Output Summary (Last 24 hours) at 3/1/2025 1145  Last data filed at 3/1/2025 0625  Gross per 24 hour   Intake --   Output 3200 ml   Net -3200 ml       Assessment and Plan     ANGELITO on CKD 3 secondary to possible ATN from hypotension  - baseline serum creatinine about 1.5 mg/dL, admission creatinine 4.3.   -his creatinine is improving and I will discontinue IV fluids.  I have advised him to stay adequately orally hydrated.  -FENa 2.0%  -Entresto currently on hold due to ANGELITO    Gross hematuria secondary to Ann trauma.  Improving.  I will discontinue his Ann catheter and watch closely for any urine retention.        S/p LRDKT in 2015, continue immunosuppression with Prograf 4/3, target Prograf level 4-7, prednisone 5 mg daily and CellCept 500 mg twice a day.  -his Prograf level was slightly low but in the acceptable range.  We will repeat his Prograf again in a.m..  His kidney ultrasound did not reveal any hydro.  His renal artery Doppler did not reveal any stenosis in the main artery.    Hypertension.  His blood pressure is stable.      Hypokalemia.  We will replace.     HFrEF.  Last echo reports LV ejection fraction about 35%.  We will DC his IV fluids.        Anemia.  Likely multifactorial.  Monitor hemoglobin.  PRBC transfusion when indicated.  On oral iron.           ________________________________________________  Yoandy Bennett

## 2025-03-01 NOTE — ASSESSMENT & PLAN NOTE
Anemia is likely due to acute blood loss which was from hematuria and chronic disease due to Chronic Kidney Disease. Most recent hemoglobin and hematocrit are listed below.  Recent Labs     02/27/25  0530 02/28/25  0625 03/01/25  0441   HGB 7.0* 7.4* 7.3*   HCT 23.9* 25.6* 25.9*     Plan  - Monitor serial CBC: Daily  - Transfuse PRBC if patient becomes hemodynamically unstable, symptomatic or H/H drops below 7/21.  - Patient has received 1 units of PRBCs on 3/1/25  - Patient's anemia is currently stable  - monitor

## 2025-03-01 NOTE — PLAN OF CARE
Pt is stable. No Sx of acute distress  Denies any pain   Blood glucose monitored  NS 75mL/hr  Chronic gan in place for urinary retention: light red urine  Turned q.2, heel boots in place    Chart orders reviewed. Bed in lowest position, wheels locked, call light within reach. Pt remains injury free. Will cont POC

## 2025-03-02 LAB
ALBUMIN SERPL BCP-MCNC: 1.6 G/DL (ref 3.5–5.2)
ALP SERPL-CCNC: 104 U/L (ref 40–150)
ALT SERPL W/O P-5'-P-CCNC: 23 U/L (ref 10–44)
ANION GAP SERPL CALC-SCNC: 10 MMOL/L (ref 8–16)
AST SERPL-CCNC: 17 U/L (ref 10–40)
BILIRUB SERPL-MCNC: 0.4 MG/DL (ref 0.1–1)
BUN SERPL-MCNC: 36 MG/DL (ref 8–23)
CALCIUM SERPL-MCNC: 8.8 MG/DL (ref 8.7–10.5)
CHLORIDE SERPL-SCNC: 111 MMOL/L (ref 95–110)
CO2 SERPL-SCNC: 21 MMOL/L (ref 23–29)
CREAT SERPL-MCNC: 2.2 MG/DL (ref 0.5–1.4)
EST. GFR  (NO RACE VARIABLE): 31 ML/MIN/1.73 M^2
GLUCOSE SERPL-MCNC: 215 MG/DL (ref 70–110)
HCT VFR BLD AUTO: 30.3 % (ref 40–54)
HGB BLD-MCNC: 8.8 G/DL (ref 14–18)
POCT GLUCOSE: 217 MG/DL (ref 70–110)
POCT GLUCOSE: 222 MG/DL (ref 70–110)
POCT GLUCOSE: 225 MG/DL (ref 70–110)
POCT GLUCOSE: 235 MG/DL (ref 70–110)
POTASSIUM SERPL-SCNC: 3.6 MMOL/L (ref 3.5–5.1)
PROT SERPL-MCNC: 4.9 G/DL (ref 6–8.4)
SODIUM SERPL-SCNC: 142 MMOL/L (ref 136–145)

## 2025-03-02 PROCEDURE — 85018 HEMOGLOBIN: CPT | Performed by: FAMILY MEDICINE

## 2025-03-02 PROCEDURE — 21400001 HC TELEMETRY ROOM

## 2025-03-02 PROCEDURE — 63600175 PHARM REV CODE 636 W HCPCS: Performed by: INTERNAL MEDICINE

## 2025-03-02 PROCEDURE — 36415 COLL VENOUS BLD VENIPUNCTURE: CPT | Performed by: FAMILY MEDICINE

## 2025-03-02 PROCEDURE — 25000003 PHARM REV CODE 250: Performed by: FAMILY MEDICINE

## 2025-03-02 PROCEDURE — 80197 ASSAY OF TACROLIMUS: CPT | Performed by: FAMILY MEDICINE

## 2025-03-02 PROCEDURE — 85014 HEMATOCRIT: CPT | Performed by: FAMILY MEDICINE

## 2025-03-02 PROCEDURE — 99223 1ST HOSP IP/OBS HIGH 75: CPT | Mod: ,,, | Performed by: UROLOGY

## 2025-03-02 PROCEDURE — 94761 N-INVAS EAR/PLS OXIMETRY MLT: CPT

## 2025-03-02 PROCEDURE — 63600175 PHARM REV CODE 636 W HCPCS: Performed by: FAMILY MEDICINE

## 2025-03-02 PROCEDURE — 97163 PT EVAL HIGH COMPLEX 45 MIN: CPT

## 2025-03-02 PROCEDURE — 25000003 PHARM REV CODE 250: Performed by: NURSE PRACTITIONER

## 2025-03-02 PROCEDURE — 99232 SBSQ HOSP IP/OBS MODERATE 35: CPT | Mod: ,,, | Performed by: INTERNAL MEDICINE

## 2025-03-02 PROCEDURE — 25000242 PHARM REV CODE 250 ALT 637 W/ HCPCS: Performed by: FAMILY MEDICINE

## 2025-03-02 PROCEDURE — 97530 THERAPEUTIC ACTIVITIES: CPT

## 2025-03-02 PROCEDURE — 80053 COMPREHEN METABOLIC PANEL: CPT | Performed by: FAMILY MEDICINE

## 2025-03-02 PROCEDURE — 94640 AIRWAY INHALATION TREATMENT: CPT

## 2025-03-02 RX ORDER — PHENAZOPYRIDINE HYDROCHLORIDE 100 MG/1
100 TABLET, FILM COATED ORAL ONCE
Status: COMPLETED | OUTPATIENT
Start: 2025-03-02 | End: 2025-03-02

## 2025-03-02 RX ADMIN — PHENAZOPYRIDINE 100 MG: 100 TABLET ORAL at 09:03

## 2025-03-02 RX ADMIN — MYCOPHENOLATE MOFETIL 500 MG: 500 TABLET, FILM COATED ORAL at 09:03

## 2025-03-02 RX ADMIN — CEFTRIAXONE 1 G: 1 INJECTION, POWDER, FOR SOLUTION INTRAMUSCULAR; INTRAVENOUS at 01:03

## 2025-03-02 RX ADMIN — INSULIN ASPART 4 UNITS: 100 INJECTION, SOLUTION INTRAVENOUS; SUBCUTANEOUS at 12:03

## 2025-03-02 RX ADMIN — FERROUS SULFATE TAB 325 MG (65 MG ELEMENTAL FE) 1 EACH: 325 (65 FE) TAB at 08:03

## 2025-03-02 RX ADMIN — TACROLIMUS 4 MG: 1 CAPSULE ORAL at 07:03

## 2025-03-02 RX ADMIN — PREDNISONE 5 MG: 5 TABLET ORAL at 08:03

## 2025-03-02 RX ADMIN — MYCOPHENOLATE MOFETIL 500 MG: 500 TABLET, FILM COATED ORAL at 08:03

## 2025-03-02 RX ADMIN — METOPROLOL SUCCINATE 25 MG: 25 TABLET, EXTENDED RELEASE ORAL at 08:03

## 2025-03-02 RX ADMIN — INSULIN GLARGINE 30 UNITS: 100 INJECTION, SOLUTION SUBCUTANEOUS at 09:03

## 2025-03-02 RX ADMIN — TACROLIMUS 3 MG: 1 CAPSULE ORAL at 06:03

## 2025-03-02 RX ADMIN — CALCITRIOL CAPSULES 0.25 MCG 0.25 MCG: 0.25 CAPSULE ORAL at 08:03

## 2025-03-02 RX ADMIN — GUAIFENESIN AND DEXTROMETHORPHAN 5 ML: 100; 10 SYRUP ORAL at 09:03

## 2025-03-02 RX ADMIN — IPRATROPIUM BROMIDE AND ALBUTEROL SULFATE 3 ML: 2.5; .5 SOLUTION RESPIRATORY (INHALATION) at 09:03

## 2025-03-02 RX ADMIN — MUPIROCIN: 20 OINTMENT TOPICAL at 08:03

## 2025-03-02 RX ADMIN — MUPIROCIN: 20 OINTMENT TOPICAL at 09:03

## 2025-03-02 RX ADMIN — INSULIN ASPART 2 UNITS: 100 INJECTION, SOLUTION INTRAVENOUS; SUBCUTANEOUS at 09:03

## 2025-03-02 RX ADMIN — INSULIN ASPART 4 UNITS: 100 INJECTION, SOLUTION INTRAVENOUS; SUBCUTANEOUS at 05:03

## 2025-03-02 RX ADMIN — INSULIN ASPART 4 UNITS: 100 INJECTION, SOLUTION INTRAVENOUS; SUBCUTANEOUS at 04:03

## 2025-03-02 NOTE — PLAN OF CARE
Discussed poc with pt, pt verbalized understanding    Purposeful rounding every 2hours    VS wnl  Cardiac monitoring in use, pt is NSR, tele monitor # [id number]  Blood glucose monitoring   Fall precautions in place, remains injury free  Pt denies c/o pain    Accurate I&Os  Abx given as prescribed  Bed locked at lowest position  Call light within reach    Chart check complete  Will cont with POC

## 2025-03-02 NOTE — SUBJECTIVE & OBJECTIVE
Interval History: See hospital course for today      Review of Systems   Constitutional:  Positive for activity change and fatigue. Negative for appetite change and fever.   Respiratory:  Positive for cough.    Cardiovascular:  Positive for leg swelling.   Genitourinary:  Positive for hematuria. Negative for difficulty urinating and dysuria.   Allergic/Immunologic: Positive for immunocompromised state.   Neurological:  Positive for weakness.   Psychiatric/Behavioral:  Negative for agitation, behavioral problems, confusion, decreased concentration and dysphoric mood.      Objective:     Vital Signs (Most Recent):  Temp: 98.4 °F (36.9 °C) (03/02/25 0831)  Pulse: 110 (03/02/25 0900)  Resp: 18 (03/02/25 0900)  BP: (!) 143/68 (03/02/25 0831)  SpO2: 99 % (03/02/25 0900) Vital Signs (24h Range):  Temp:  [97.6 °F (36.4 °C)-98.8 °F (37.1 °C)] 98.4 °F (36.9 °C)  Pulse:  [] 110  Resp:  [16-20] 18  SpO2:  [95 %-99 %] 99 %  BP: (114-158)/(53-86) 143/68     Weight: 120.7 kg (266 lb 1.5 oz)  Body mass index is 36.09 kg/m².    Intake/Output Summary (Last 24 hours) at 3/2/2025 1108  Last data filed at 3/2/2025 1044  Gross per 24 hour   Intake 506.25 ml   Output 776 ml   Net -269.75 ml         Physical Exam  Vitals and nursing note reviewed. Exam conducted with a chaperone present (daughter).   Constitutional:       General: He is not in acute distress.     Appearance: He is ill-appearing. He is not toxic-appearing.   HENT:      Head: Normocephalic and atraumatic.   Cardiovascular:      Rate and Rhythm: Tachycardia present.   Pulmonary:      Effort: Pulmonary effort is normal. No respiratory distress.   Abdominal:      Palpations: Abdomen is soft.      Tenderness: There is no abdominal tenderness.   Musculoskeletal:      Right lower leg: Edema present.      Left lower leg: Edema present.   Skin:     General: Skin is warm.   Neurological:      Mental Status: He is alert and oriented to person, place, and time.      Motor:  Weakness present.             Significant Labs: All pertinent labs within the past 24 hours have been reviewed.  CMP:   Recent Labs   Lab 03/01/25  0441 03/02/25  0555    142   K 3.3* 3.6   * 111*   CO2 20* 21*   GLU 92 215*   BUN 43* 36*   CREATININE 2.6* 2.2*   CALCIUM 9.0 8.8   PROT  --  4.9*   ALBUMIN  --  1.6*   BILITOT  --  0.4   ALKPHOS  --  104   AST  --  17   ALT  --  23   ANIONGAP 11 10       Significant Imaging: I have reviewed all pertinent imaging results/findings within the past 24 hours.

## 2025-03-02 NOTE — ASSESSMENT & PLAN NOTE
Possibly related to Gan insertion   Will continue to monitor   should hematuria persists will plan to consult Urology on case  Hematuria improving  Discontinue gan  Agreeable to blood transfusion  Urology note reviewed  Continue intravenous antibiotic(s)

## 2025-03-02 NOTE — ASSESSMENT & PLAN NOTE
ANGELITO is likely due to pre-renal azotemia due to dehydration. Baseline creatinine is  1.4 . Most recent creatinine and eGFR are listed below.  Recent Labs     02/28/25  0625 03/01/25  0441 03/02/25  0555   CREATININE 3.6* 2.6* 2.2*   EGFRNORACEVR 17* 26* 31*        Plan  Hold diuretics   Monitor urine output   Urine sodium and creatinine   Consult Nephrology on case   Continue IVF  Patient may need renal biopsy    03/02/2025  Creatinine improving   Discontinue intravenous fluids per nephrology   Nephrology on case

## 2025-03-02 NOTE — PT/OT/SLP EVAL
Physical Therapy Evaluation    Patient Name:  Mitch Whittaker   MRN:  [id number]    Recommendations:     Discharge Recommendations: Moderate Intensity Therapy   Discharge Equipment Recommendations: none   Barriers to discharge: None    Assessment:     Mitch Whittaker is a 69 y.o. male admitted with a medical diagnosis of ANGELITO (acute kidney injury).  He presents with the following impairments/functional limitations: weakness, impaired endurance, impaired self care skills, impaired functional mobility, gait instability, impaired balance, decreased ROM, pain, decreased safety awareness, decreased lower extremity function, decreased upper extremity function, decreased coordination, impaired skin .    Rehab Prognosis: Fair; patient would benefit from acute skilled PT services to address these deficits and reach maximum level of function.    Recent Surgery: * No surgery found *      Plan:     During this hospitalization, patient to be seen 3 x/week to address the identified rehab impairments via therapeutic exercises, therapeutic activities and progress toward the following goals:    Plan of Care Expires:  03/16/25    Subjective     Chief Complaint: PAIN B LE   Patient/Family Comments/goals: WALK  Pain/Comfort:  Pain Rating 1: 10/10  Location - Side 1: Bilateral  Location 1: leg  Pain Addressed 1: Cessation of Activity  Pain Rating Post-Intervention 1: 10/10    Patients cultural, spiritual, Orthodoxy conflicts given the current situation:      Living Environment:  PT LIVES AT [facility]. PT IS A NEW RESIDENT APPROX 1 MONTH   Prior to admission, patients level of function was TOTAL CARE @ NH FOR PAST MONTH , JEAN LIFT TO STANDING FRAME. PT STAND WITH ASSIST X 2. PRIOR TO ONE MONTH AGO PT AND DAUGHTER REPORTED PT BEING AMBULATORY AND LIVING AT HOME. .  Equipment used at home: none.  DME owned (not currently used): none.  Upon discharge, patient will have assistance from NH STAFF.    Objective:     Communicated with  EPIC CHART REVIEW  prior to session.  Patient found supine SATURATED IN URINE with peripheral IV, telemetry  upon PT entry to room.    General Precautions: Standard, fall  Orthopedic Precautions:N/A   Braces: N/A  Respiratory Status: Room air    Exams:  Cognitive Exam:  Patient is oriented to Person, Place, Time, and Situation  RLE ROM: SEVERE LIMITED   RLE Strength: UNABLE TO ASSESS D/T PAIN   LLE ROM: SEVERE LIMITED   LLE Strength: UNABLE TO ASSESS D/T PAIN     Functional Mobility:  Bed Mobility:     Rolling Left:  maximal assistance and of 2 persons  Scooting: maximal assistance and of 2 persons      AM-PAC 6 CLICK MOBILITY  Total Score:6       Treatment & Education:  PT MET IN  WITH DAUGHTER PRESENT. PT SATURATED IN URINE. P.T. CALLED FOR CLEANING. P.T. ASSIST PT TO ATTEMPT SITTING EOB X 3 TRIALS WITH DAUGHTER ASSIST AS WELL. PT CRYING OUT IN B LE PAIN  WITH MOBILITY. PT REPORTED NH USING JEAN LIFT. P.T. EDUCATED PT ON IMPORTANCE OF BED MOBILITY AS WELL AND INC ROM AND STRENGTHENING. P.T. EDUCATED PT ON CALLING WHEN SOILED TO DEC RISK OF SKIN BREAKDOWN AND BED SORES AS WELL. PT REPORTED UNDERSTANDING. PT ROLLED X 2 WITH MAX A AND SCOOTED TO HOB WITH MAX A X2. PT UNWILLING TO SIT EOB D/T PAIN.     Patient left supine with call button in reach.    GOALS:   Multidisciplinary Problems       Physical Therapy Goals          Problem: Physical Therapy    Goal Priority Disciplines Outcome Interventions   Physical Therapy Goal     PT, PT/OT     Description: LTG: 3/16/25  1. PT WILL LOG ROLL IN BED WITH MIN A  2. PT WILL SIT EOB WITH MOD A FOR SITTING TOLERANCE  3. PT WILL COMPLETE B LE TE X 10 REPS TO INC ROM AND STRENGTHENING  4. PT WILL INC AMPAC SCORE BY 2 POINTS TO PROGRESS GROSS FUNC MOBILITY.                          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 RNA NEGATIVE 5/2015, 2021, 2022    History of colon polyps     HPTH (hyperparathyroidism)     Hyperlipidemia     Hypertension associated with stage 3 chronic kidney disease due to type 2 diabetes mellitus     LBBB (left bundle branch block) 12/20/2021    Morbid obesity with BMI of 45.0-49.9, adult     Nephrolithiasis 6/7/2013    PCO (posterior capsular opacification), left 03/04/2019    Proteinuria     resolved s/p transplant    S/P kidney transplant     Sleep apnea     Type 2 diabetes, uncontrolled, with retinopathy     Type II diabetes mellitus with renal manifestations        Past Surgical History:   Procedure Laterality Date    CARDIAC CATHETERIZATION  01/01/2008    normal coronary    CARPAL TUNNEL RELEASE Right 12/01/2023    Procedure: RELEASE, CARPAL TUNNEL;  Surgeon: Noel Almonte MD;  Location: [facility] OR;  Service: Orthopedics;  Laterality: Right;    CARPAL TUNNEL RELEASE Left 7/18/2024    Procedure: RELEASE, CARPAL TUNNEL;  Surgeon: Noel Almonte MD;  Location: [facility] OR;  Service: Orthopedics;  Laterality: Left;    COLONOSCOPY N/A 04/05/2018    Procedure: COLONOSCOPY;  Surgeon: Chava Ronquillo MD;  Location: [facility] ENDO;  Service: Endoscopy;  Laterality: N/A;    COLONOSCOPY N/A 05/02/2022    Procedure: COLONOSCOPY;  Surgeon: Alix  CHRIS Puente MD;  Location: [facility];  Service: Endoscopy;  Laterality: N/A;    COLONOSCOPY N/A 06/07/2023    Procedure: COLONOSCOPY - rule out CMV  Cardiac clearance/Eliquis hold approval received on 05/21/23 per Dr. Meade, cardiology.  Note in encounters.  LB;  Surgeon: Daniella Shah MD;  Location: [facility];  Service: Endoscopy;  Laterality: N/A;    ESOPHAGOGASTRODUODENOSCOPY N/A 03/26/2024    Procedure: EGD (ESOPHAGOGASTRODUODENOSCOPY) 3/1-pt cleared, ok to hold eliquis for 3 days;  Surgeon: Daniella Shah MD;  Location: [facility];  Service: Endoscopy;  Laterality: N/A;    KIDNEY TRANSPLANT  2015    RETINAL LASER PROCEDURE         Time Tracking:     PT Received On: 03/02/25  PT Start Time: 1045     PT Stop Time: 1108  PT Total Time (min): 23 min     Billable Minutes: Evaluation 13 and Therapeutic Activity 10      03/02/2025

## 2025-03-02 NOTE — ASSESSMENT & PLAN NOTE
Patient's FSGs are controlled on current medication regimen.  Last A1c reviewed-   Lab Results   Component Value Date    HGBA1C 5.7 (H) 12/17/2024     Most recent fingerstick glucose reviewed-   Recent Labs   Lab 03/01/25  1141 03/01/25  1757 03/01/25  2049 03/02/25  0554   POCTGLUCOSE 90 221* 256* 222*       Current correctional scale  Medium  Maintain anti-hyperglycemic dose as follows-   Antihyperglycemics (From admission, onward)      Start     Stop Route Frequency Ordered    02/26/25 2100  insulin glargine U-100 (Lantus) pen 30 Units         -- SubQ Nightly 02/26/25 1451    02/26/25 1551  insulin aspart U-100 pen 0-10 Units         -- SubQ Before meals & nightly PRN 02/26/25 1451          Hold Oral hypoglycemics while patient is in the hospital.  Continue adjusting insulin needs as indicated

## 2025-03-02 NOTE — ASSESSMENT & PLAN NOTE
Follows pulmonology outpatient  Former smoker  Complains of dry cough but no dyspnea  Schedule breathing treatments and monitor response

## 2025-03-02 NOTE — PROGRESS NOTES
[facility] Medicine  Progress Note    Patient Name: Mitch Whittaker  MRN: [id number]  Patient Class: IP- Inpatient   Admission Date: 2/26/2025  Length of Stay: 4 days  Attending Physician: Lindsey Ferreira MD  Primary Care Provider: Valery Caal MD        Subjective     Principal Problem:ANGELITO (acute kidney injury)        HPI:  Patient is a 69-year-old  male with a PMH of CHF, kidney transplant, CAD, DM, DVT who presents to the ED with complaints of weakness.  Patient is a resident at [facility].  He states he was sent here for renal failure.  Patient does report still producing urine however it is decreased.  Does complain of some dysuria.  Reports that oral intake is good.  Denies any fever or chills.  No other issues reported at this time.    In the ED, H&H 8.0/27, K:  2.9, BUN/CR 60/4.3.  ED discussed with Dr. Gonsalez who recommended fluid challenge due to concern with over-diuresis.        Overview/Hospital Course:  02/27/2025  Will start empiric Rocephin for UTI.  Creatinine stable.  Continue IVF.  Nephrology on case.  Patient may need renal biopsy.  02/28/2025  Creatinine trending down.  Will continue IVF.  Gross hematuria improving.  Continue Rocephin for UTI.  3/1 creatinine continues to improve. Discontinue intravenous fluids per nephrology. Discontinue gan per nephrology, monitor for urinary retention. Discussed blood transfusion with nephrology and patient also agreeable. No transfusion reaction in the past.  3/2 urology consulted. hematuria improving. Denies abdominal pain. Complains of weakness and cough and leg swelling. Physical/occupational therapy consulted. Creatinine improving    Interval History: See hospital course for today      Review of Systems   Constitutional:  Positive for activity change and fatigue. Negative for appetite change and fever.   Respiratory:  Positive for cough.    Cardiovascular:  Positive for leg swelling.   Genitourinary:  Positive  for hematuria. Negative for difficulty urinating and dysuria.   Allergic/Immunologic: Positive for immunocompromised state.   Neurological:  Positive for weakness.   Psychiatric/Behavioral:  Negative for agitation, behavioral problems, confusion, decreased concentration and dysphoric mood.      Objective:     Vital Signs (Most Recent):  Temp: 98.4 °F (36.9 °C) (03/02/25 0831)  Pulse: 110 (03/02/25 0900)  Resp: 18 (03/02/25 0900)  BP: (!) 143/68 (03/02/25 0831)  SpO2: 99 % (03/02/25 0900) Vital Signs (24h Range):  Temp:  [97.6 °F (36.4 °C)-98.8 °F (37.1 °C)] 98.4 °F (36.9 °C)  Pulse:  [] 110  Resp:  [16-20] 18  SpO2:  [95 %-99 %] 99 %  BP: (114-158)/(53-86) 143/68     Weight: 120.7 kg (266 lb 1.5 oz)  Body mass index is 36.09 kg/m².    Intake/Output Summary (Last 24 hours) at 3/2/2025 1108  Last data filed at 3/2/2025 1044  Gross per 24 hour   Intake 506.25 ml   Output 776 ml   Net -269.75 ml         Physical Exam  Vitals and nursing note reviewed. Exam conducted with a chaperone present (daughter).   Constitutional:       General: He is not in acute distress.     Appearance: He is ill-appearing. He is not toxic-appearing.   HENT:      Head: Normocephalic and atraumatic.   Cardiovascular:      Rate and Rhythm: Tachycardia present.   Pulmonary:      Effort: Pulmonary effort is normal. No respiratory distress.   Abdominal:      Palpations: Abdomen is soft.      Tenderness: There is no abdominal tenderness.   Musculoskeletal:      Right lower leg: Edema present.      Left lower leg: Edema present.   Skin:     General: Skin is warm.   Neurological:      Mental Status: He is alert and oriented to person, place, and time.      Motor: Weakness present.             Significant Labs: All pertinent labs within the past 24 hours have been reviewed.  CMP:   Recent Labs   Lab 03/01/25  0441 03/02/25  0555    142   K 3.3* 3.6   * 111*   CO2 20* 21*   GLU 92 215*   BUN 43* 36*   CREATININE 2.6* 2.2*   CALCIUM 9.0  8.8   PROT  --  4.9*   ALBUMIN  --  1.6*   BILITOT  --  0.4   ALKPHOS  --  104   AST  --  17   ALT  --  23   ANIONGAP 11 10       Significant Imaging: I have reviewed all pertinent imaging results/findings within the past 24 hours.    Assessment and Plan     * ANGELITO (acute kidney injury)  ANGELITO is likely due to pre-renal azotemia due to dehydration. Baseline creatinine is  1.4 . Most recent creatinine and eGFR are listed below.  Recent Labs     02/28/25  0625 03/01/25  0441 03/02/25  0555   CREATININE 3.6* 2.6* 2.2*   EGFRNORACEVR 17* 26* 31*        Plan  Hold diuretics   Monitor urine output   Urine sodium and creatinine   Consult Nephrology on case   Continue IVF  Patient may need renal biopsy    03/02/2025  Creatinine improving   Discontinue intravenous fluids per nephrology   Nephrology on case    Anemia, chronic disease  Anemia is likely due to acute blood loss which was from hematuria and chronic disease due to Chronic Kidney Disease. Most recent hemoglobin and hematocrit are listed below.  Recent Labs     02/28/25  0625 03/01/25  0441   HGB 7.4* 7.3*   HCT 25.6* 25.9*       Plan  - Monitor serial CBC: Daily  - Transfuse PRBC if patient becomes hemodynamically unstable, symptomatic or H/H drops below 7/21.  - Patient has received 1 units of PRBCs on 3/1/25  - Patient's anemia is currently stable  - cbc in am    UTI (urinary tract infection)  Continue Rocephin       Gross hematuria  Possibly related to Gan insertion   Will continue to monitor   should hematuria persists will plan to consult Urology on case  Hematuria improving  Discontinue gan  Agreeable to blood transfusion  Urology note reviewed  Continue intravenous antibiotic(s)     Chronic cough  Follows pulmonology outpatient  Former smoker  Complains of dry cough but no dyspnea  Schedule breathing treatments and monitor response      Deep vein thrombosis (DVT) of lower extremity  Due to hematuria   Will hold anticoagulation for now  Hb/hct in am  "      Chronic combined systolic and diastolic congestive heart failure  Patient has Combined Systolic and Diastolic heart failure that is Chronic. On presentation their CHF was well compensated. Most recent BNP and echo results are listed below.  No results for input(s): "BNP" in the last 72 hours.    Latest ECHO  Results for orders placed during the hospital encounter of 11/26/24    Echo    Interpretation Summary    Left Ventricle: The left ventricle is normal in size. Normal wall thickness. There is concentric hypertrophy. Normal wall motion. Septal motion is consistent with bundle branch block. There is moderately reduced systolic function. Ejection fraction is approximately 35%. Grade I diastolic dysfunction.    Right Ventricle: Normal right ventricular cavity size. Wall thickness is normal. Systolic function is normal.    IVC/SVC: Normal venous pressure at 3 mmHg.    Current Heart Failure Medications  metoprolol succinate (TOPROL-XL) 24 hr tablet 25 mg, Daily, Oral  hydrALAZINE injection 10 mg, Every 6 hours PRN, Intravenous    Plan  - Monitor strict I&Os and daily weights.    - Place on telemetry  - Low sodium diet  - Place on fluid restriction of 1.5 L.   - Cardiology has not been consulted  - The patient's volume status is at their baseline  - patient appears euvolemic    One time bumex after blood transfusion      Type II diabetes mellitus with renal manifestations  Patient's FSGs are controlled on current medication regimen.  Last A1c reviewed-   Lab Results   Component Value Date    HGBA1C 5.7 (H) 12/17/2024     Most recent fingerstick glucose reviewed-   Recent Labs   Lab 03/01/25  1141 03/01/25  1757 03/01/25  2049 03/02/25  0554   POCTGLUCOSE 90 221* 256* 222*       Current correctional scale  Medium  Maintain anti-hyperglycemic dose as follows-   Antihyperglycemics (From admission, onward)      Start     Stop Route Frequency Ordered    02/26/25 2100  insulin glargine U-100 (Lantus) pen 30 Units      "    -- SubQ Nightly 02/26/25 1451    02/26/25 1551  insulin aspart U-100 pen 0-10 Units         -- SubQ Before meals & nightly PRN 02/26/25 1451          Hold Oral hypoglycemics while patient is in the hospital.  Continue adjusting insulin needs as indicated     Chronic immunosuppression with Prograf, MMF and prednisone  Will hold CellCept for now   Continue Prograf   Prograf levels 3.4  Continue prednisone      Class 3 severe obesity due to excess calories with body mass index (BMI) of 40.0 to 44.9 in adult  Body mass index is 36.09 kg/m². Morbid obesity complicates all aspects of disease management from diagnostic modalities to treatment. Weight loss encouraged and health benefits explained to patient.   Physical/occupational therapy         VTE Risk Mitigation (From admission, onward)           Ordered     Place sequential compression device  Until discontinued         02/26/25 1607                    Discharge Planning   GRETEL:      Code Status: Full Code   Medical Readiness for Discharge Date:                          Lindsey Ferreira MD  Department of Hospital Medicine   [facility] - Med Surg

## 2025-03-02 NOTE — ASSESSMENT & PLAN NOTE
Body mass index is 36.09 kg/m². Morbid obesity complicates all aspects of disease management from diagnostic modalities to treatment. Weight loss encouraged and health benefits explained to patient.   Physical/occupational therapy

## 2025-03-02 NOTE — ASSESSMENT & PLAN NOTE
"Patient has Combined Systolic and Diastolic heart failure that is Chronic. On presentation their CHF was well compensated. Most recent BNP and echo results are listed below.  No results for input(s): "BNP" in the last 72 hours.    Latest ECHO  Results for orders placed during the hospital encounter of 11/26/24    Echo    Interpretation Summary    Left Ventricle: The left ventricle is normal in size. Normal wall thickness. There is concentric hypertrophy. Normal wall motion. Septal motion is consistent with bundle branch block. There is moderately reduced systolic function. Ejection fraction is approximately 35%. Grade I diastolic dysfunction.    Right Ventricle: Normal right ventricular cavity size. Wall thickness is normal. Systolic function is normal.    IVC/SVC: Normal venous pressure at 3 mmHg.    Current Heart Failure Medications  metoprolol succinate (TOPROL-XL) 24 hr tablet 25 mg, Daily, Oral  hydrALAZINE injection 10 mg, Every 6 hours PRN, Intravenous    Plan  - Monitor strict I&Os and daily weights.    - Place on telemetry  - Low sodium diet  - Place on fluid restriction of 1.5 L.   - Cardiology has not been consulted  - The patient's volume status is at their baseline  - patient appears euvolemic    One time bumex after blood transfusion    "

## 2025-03-02 NOTE — ASSESSMENT & PLAN NOTE
Anemia is likely due to acute blood loss which was from hematuria and chronic disease due to Chronic Kidney Disease. Most recent hemoglobin and hematocrit are listed below.  Recent Labs     02/28/25  0625 03/01/25  0441   HGB 7.4* 7.3*   HCT 25.6* 25.9*       Plan  - Monitor serial CBC: Daily  - Transfuse PRBC if patient becomes hemodynamically unstable, symptomatic or H/H drops below 7/21.  - Patient has received 1 units of PRBCs on 3/1/25  - Patient's anemia is currently stable  - cbc in am

## 2025-03-02 NOTE — PLAN OF CARE
Pt is stable. No Sx of acute distress  Pt able to void and use urinal. Urine is red and is still passing clots; urology aware  Encouraged frequent weight shifting. Pt agreeable to pillow tilts and heel boots  Received 1 unit of blood on 3/1  Blood glucose monitored  Cardiac monitor: [id number]    Chart orders reviewed. Bed in lowest position, wheels locked, call light within reach. Pt remains injury free. Will cont POC

## 2025-03-02 NOTE — PROGRESS NOTES
Nephrology Progress Note     History of Present Illness     Mitch Whittaker   is a 69 y.o. 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  Patient has a routine nephrology follow up visit today and part of his appointment he had lab work done that revealed acute on chronic renal failure, patient was advised to come to the emergency room for further evaluation.  According to the family patient has been in the [facility] rehab facility for some lower extremity weakness.  Admission labs revealed a BUN of 60 and a serum creatinine of 4.3 mg/dL.  Patient denies any pain in his abdomen or tenderness over his allograft.  He has a Ann catheter was placed in the ER that has bloody urine.  Blood pressure was slightly low on presentation which improved in the ER after fluid bolus.  We are consulted for acute on chronic renal failure       Interval History   Overnight/currently:  Patient denies any cough, chest pain or shortness of breath.  He has Ann catheter was discontinued yesterday.  He is making urine.      Allergies:    is allergic to lisinopril, actos  [pioglitazone], and metformin.    Current medications:   Scheduled Meds:   calcitRIOL  0.25 mcg Oral Daily    cefTRIAXone (Rocephin) IV (PEDS and ADULTS)  1 g Intravenous Q24H    ferrous sulfate  1 tablet Oral Daily    insulin glargine U-100  30 Units Subcutaneous QHS    metoprolol succinate  25 mg Oral Daily    mupirocin   Nasal BID    mycophenolate  500 mg Oral BID    predniSONE  5 mg Oral Daily    tacrolimus  4 mg Oral Daily AM    And    tacrolimus  3 mg Oral Daily PM     Continuous Infusions:      PRN Meds:.  Current Facility-Administered  Medications:     0.9%  NaCl infusion (for blood administration), , Intravenous, Q24H PRN    acetaminophen, 650 mg, Oral, Q6H PRN    amitriptyline, 25 mg, Oral, Nightly PRN    dextromethorphan-guaiFENesin  mg/5 ml, 5 mL, Oral, Q4H PRN    dextrose 50%, 12.5 g, Intravenous, PRN    dextrose 50%, 25 g, Intravenous, PRN    glucagon (human recombinant), 1 mg, Intramuscular, PRN    glucose, 16 g, Oral, PRN    glucose, 24 g, Oral, PRN    hydrALAZINE, 10 mg, Intravenous, Q6H PRN    HYDROcodone-acetaminophen, 1 tablet, Oral, Q8H PRN    insulin aspart U-100, 0-10 Units, Subcutaneous, QID (AC + HS) PRN    ondansetron, 4 mg, Oral, Q6H PRN     Physical Examination     VS/Measurements    BP (!) 143/68 (BP Location: Left arm, Patient Position: Lying)   Pulse 110   Temp 98.4 °F (36.9 °C) (Oral)   Resp 18   Ht 6' (1.829 m)   Wt 120.7 kg (266 lb 1.5 oz)   SpO2 99%   BMI 36.09 kg/m²         General:  Moderately built, not in any distress.  Neck:  Supple,   Respiratory: Non-labored,  Lungs are clear to auscultation.    Cardiovascular:  Normal rate, Regular rhythm.   Abdomen:  Soft, Non-tender, Normal bowel sounds.   Muskuloskeletal:  pedal edema +          Laboratory Results   Today's Lab Results :    Recent Results (from the past 24 hours)   Prepare RBC 1 Unit    Collection Time: 03/01/25  1:21 PM   Result Value Ref Range    UNIT NUMBER [id number]     Product Code [id number]     DISPENSE STATUS TRANSFUSED     CODING SYSTEM [id number]     Unit Blood Type Code 7300     Unit Blood Type B POS     Unit Expiration [id number]     CROSSMATCH INTERPRETATION Compatible    Type & Screen    Collection Time: 03/01/25  1:21 PM   Result Value Ref Range    Group & Rh B POS     Indirect Jimmy NEG     Specimen Outdate 03/04/2025 23:59    POCT glucose    Collection Time: 03/01/25  5:57 PM   Result Value Ref Range    POCT Glucose 221 (H) 70 - 110 mg/dL   POCT glucose    Collection Time: 03/01/25  8:49 PM   Result Value Ref Range    POCT  Glucose 256 (H) 70 - 110 mg/dL   POCT glucose    Collection Time: 03/02/25  5:54 AM   Result Value Ref Range    POCT Glucose 222 (H) 70 - 110 mg/dL   Comprehensive metabolic panel    Collection Time: 03/02/25  5:55 AM   Result Value Ref Range    Sodium 142 136 - 145 mmol/L    Potassium 3.6 3.5 - 5.1 mmol/L    Chloride 111 (H) 95 - 110 mmol/L    CO2 21 (L) 23 - 29 mmol/L    Glucose 215 (H) 70 - 110 mg/dL    BUN 36 (H) 8 - 23 mg/dL    Creatinine 2.2 (H) 0.5 - 1.4 mg/dL    Calcium 8.8 8.7 - 10.5 mg/dL    Total Protein 4.9 (L) 6.0 - 8.4 g/dL    Albumin 1.6 (L) 3.5 - 5.2 g/dL    Total Bilirubin 0.4 0.1 - 1.0 mg/dL    Alkaline Phosphatase 104 40 - 150 U/L    AST 17 10 - 40 U/L    ALT 23 10 - 44 U/L    eGFR 31 (A) >60 mL/min/1.73 m^2    Anion Gap 10 8 - 16 mmol/L   POCT glucose    Collection Time: 03/02/25 11:58 AM   Result Value Ref Range    POCT Glucose 217 (H) 70 - 110 mg/dL       LABS:  Reviewed Yes      Intake/Output Summary (Last 24 hours) at 3/2/2025 1209  Last data filed at 3/2/2025 1044  Gross per 24 hour   Intake 506.25 ml   Output 776 ml   Net -269.75 ml       Assessment and Plan     ANGELITO on CKD 3 secondary to possible ATN from hypotension  - baseline serum creatinine about 1.5 mg/dL, admission creatinine 4.3.   -his creatinine is improving with good urine output.  I have advised him to stay adequately hydrated.  -FENa 2.0%  -Entresto currently on hold due to ANGELITO    Gross hematuria secondary to Ann trauma.  His Ann catheter discontinued.  He is making urine.        S/p LRDKT in 2015, continue immunosuppression with Prograf 4/3, target Prograf level 4-7, prednisone 5 mg daily and CellCept 500 mg twice a day.  -his Prograf level was slightly low but in the acceptable range.  We will repeat his Prograf again in a.m..  His kidney ultrasound did not reveal any hydro.  His renal artery Doppler did not reveal any stenosis in the main artery.    Hypertension.  His blood pressure is stable.     Hypokalemia.  We will  replace.     HFrEF.  Last echo reports LV ejection fraction about 35%.  He is off IV fluids.      Anemia.  Likely multifactorial.  Monitor hemoglobin.  PRBC transfusion when indicated.  On oral iron.           ________________________________________________  Yoandy Bennett

## 2025-03-02 NOTE — CONSULTS
Chief Complaint:  Gross hematuria    HPI:   Mitch Whittaker is a 69-year-old  male with a PMH of CHF, kidney transplant, CAD, DM, DVT who presents to the ED with complaints of weakness.  Patient is a resident at [facility].  He states he was sent here for renal failure.  Patient does report still producing urine however it is decreased.  Does complain of some dysuria.  Reports that oral intake is good.  Denies any fever or chills.  No other issues reported at this time. In the ED, H&H 8.0/27, K:  2.9, BUN/CR 60/4.3.  ED discussed with Dr. Gonsalez who recommended fluid challenge due to concern with over-diuresis.  Patient began having brown colored urine with small clots yesterday.  Noted some dysuria at the time.  Urinalysis collected 2/26 showed positive nitrites and leukocyte esterase, micro showed positive bacteria but no culture was obtained.  Patient notes no further hematuria since yesterday.  Patient denies any issues with bladder infections or prior episodes of gross hematuria.  Denies prior urologic procedures.    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 RNA NEGATIVE 5/2015, 2021, 2022    History of colon polyps     HPTH (hyperparathyroidism)      Hyperlipidemia     Hypertension associated with stage 3 chronic kidney disease due to type 2 diabetes mellitus     LBBB (left bundle branch block) 12/20/2021    Morbid obesity with BMI of 45.0-49.9, adult     Nephrolithiasis 6/7/2013    PCO (posterior capsular opacification), left 03/04/2019    Proteinuria     resolved s/p transplant    S/P kidney transplant     Sleep apnea     Type 2 diabetes, uncontrolled, with retinopathy     Type II diabetes mellitus with renal manifestations        PSH:  Past Surgical History:   Procedure Laterality Date    CARDIAC CATHETERIZATION  01/01/2008    normal coronary    CARPAL TUNNEL RELEASE Right 12/01/2023    Procedure: RELEASE, CARPAL TUNNEL;  Surgeon: Noel Almonte MD;  Location: [facility] OR;  Service: Orthopedics;  Laterality: Right;    CARPAL TUNNEL RELEASE Left 7/18/2024    Procedure: RELEASE, CARPAL TUNNEL;  Surgeon: Noel Almonte MD;  Location: [facility] OR;  Service: Orthopedics;  Laterality: Left;    COLONOSCOPY N/A 04/05/2018    Procedure: COLONOSCOPY;  Surgeon: Chava Ronquillo MD;  Location: [facility] ENDO;  Service: Endoscopy;  Laterality: N/A;    COLONOSCOPY N/A 05/02/2022    Procedure: COLONOSCOPY;  Surgeon: Alix Puente MD;  Location: [facility] ENDO;  Service: Endoscopy;  Laterality: N/A;    COLONOSCOPY N/A 06/07/2023    Procedure: COLONOSCOPY - rule out CMV  Cardiac clearance/Eliquis hold approval received on 05/21/23 per Dr. Meade, cardiology.  Note in encounters.  LB;  Surgeon: Daniella Shah MD;  Location: [facility];  Service: Endoscopy;  Laterality: N/A;    ESOPHAGOGASTRODUODENOSCOPY N/A 03/26/2024    Procedure: EGD (ESOPHAGOGASTRODUODENOSCOPY) 3/1-pt cleared, ok to hold eliquis for 3 days;  Surgeon: Daniella Shah MD;  Location: [facility];  Service: Endoscopy;  Laterality: N/A;    KIDNEY TRANSPLANT  2015    RETINAL LASER PROCEDURE         Family History:  Family History   Problem Relation Name Age of Onset    Diabetes Mother      Hypertension  Mother      Heart failure Mother      Heart failure Father      Kidney disease Sister          ESRD    Diabetes Sister      Diabetes Maternal Grandmother      Cancer Neg Hx         Social History:  Social History[1]     Review of Systems:  General: No fever, chills  Skin: No rashes  Chest:  Denies cough and sputum production  Heart: Denies chest pain  Resp: Denies dyspnea  Abdomen: Denies diarrhea, abdominal pain, hematemesis, or blood in stool.  Musculoskeletal: No joint stiffness or swelling. Denies back pain.  : see HPI  Neuro: no dizziness or weakness    Allergies:  Lisinopril, Actos  [pioglitazone], and Metformin    Medications:  Current Medications[2]    Physical Exam:  Vitals:    03/02/25 0900   BP:    Pulse: 110   Resp: 18   Temp:      Body mass index is 36.09 kg/m².  General: awake, alert, cooperative  Head: NC/AT  Ears: external ears normal  Eyes: sclera normal  Lungs: normal inspiration, NAD  Heart: well-perfused  Skin: The skin is warm and dry  Ext: No c/c/e.  Neuro: grossly intact, AOx3    RADIOLOGY:  US TRANSPLANT KIDNEY WITH DOPPLER     CLINICAL HISTORY:  ANGELITO;     TECHNIQUE:  Real time gray scale and doppler ultrasound was performed over the patient's renal allograft.     COMPARISON:  Renal transplant ultrasound 01/12/2025     FINDINGS:  Renal allograft in the right lower quadrant.  The allograft measures 10.3 cm. Normal perfusion. No hydronephrosis.     No fluid collections.     Vasculature:     Resistive indices are as follows: Interlobar 0.59, upper segmental 0.80, mid segmental 0.85, lower segmental 0.80 (previously 0.57, 0.88, 0.81, and 0.82).  No significant change.     Main renal artery peak systolic velocity: 216cm/sec with normal waveform at the hilum, previously 46 cm/sec at the hilum.  Velocity at the anastomosis measures 140, previously 170 cm/sec.     Renal artery/iliac ratio: 1.6.     The main renal vein is patent.     Urinary bladder is noted to be decompressed around a Ann  catheter.     Impression:     Stable elevation of intrarenal segmental resistive indices.  Finding may be seen in the setting of ATN, rejection, and or drug toxicity.  No hydronephrosis.     Interval increase in main renal artery peak systolic velocity at 216 cm/sec.  Normal renal artery/iliac ratio maintained.  Of note, peak systolic velocity now located at the hilum, previously at the anastomosis.  Close follow-up advised.    LABS:  I personally reviewed the following lab values:  Lab Results   Component Value Date    WBC 3.73 (L) 2025    HGB 7.3 (L) 2025    HCT 25.9 (L) 2025     2025     2025    K 3.6 2025     (H) 2025    CREATININE 2.2 (H) 2025    BUN 36 (H) 2025    CO2 21 (L) 2025    TSH 0.999 2022    PSA 0.33 10/18/2021    INR 1.1 2025    HGBA1C 5.7 (H) 2024    CHOL 175 2024    TRIG 134 2024    HDL 50 2024    ALT 23 2025    AST 17 2025       Assessment/Plan:   Mitch Whittaker is a 69 y.o. male with:    Gross hematuria - likely secondary to infection as urinalysis was positive however no urine culture was sent, hematuria appears to have cleared, no Ann catheter or CBI necessary at this time, we will plan for follow-up in the office for cystoscopy after discharge from the hospital, call with further questions or concerns    Thank you for allowing me the opportunity to participate in this patient's care.     Stu Light MD  Urology         [1]   Social History  Tobacco Use    Smoking status: Former     Current packs/day: 0.00     Types: Cigarettes     Quit date: 2013     Years since quittin.7     Passive exposure: Past    Smokeless tobacco: Former     Quit date: 2013    Tobacco comments:     used marijuana since 2030-3000, stopped after started dialysis   Substance Use Topics    Alcohol use: No     Alcohol/week: 0.0 standard drinks of alcohol    Drug use: Not  Currently     Comment:     [2]   Current Facility-Administered Medications:     0.9%  NaCl infusion (for blood administration), , Intravenous, Q24H PRN, Lindsey Ferreira MD    acetaminophen tablet 650 mg, 650 mg, Oral, Q6H PRNOra Loi, MD    amitriptyline tablet 25 mg, 25 mg, Oral, Nightly PRN, Long Payan MD    calcitRIOL capsule 0.25 mcg, 0.25 mcg, Oral, Daily, Long Payan MD, 0.25 mcg at 03/02/25 0854    cefTRIAXone injection 1 g, 1 g, Intravenous, Q24H, Long Payan MD, 1 g at 03/01/25 1520    dextromethorphan-guaiFENesin  mg/5 ml liquid 5 mL, 5 mL, Oral, Q4H PRN, Long Payan MD, 5 mL at 02/27/25 2246    dextrose 50% injection 12.5 g, 12.5 g, Intravenous, PRNOra Loi, MD    dextrose 50% injection 25 g, 25 g, Intravenous, PRNOra Loi, MD    ferrous sulfate tablet 1 each, 1 tablet, Oral, Daily, Long Payan MD, 1 each at 03/02/25 0854    glucagon (human recombinant) injection 1 mg, 1 mg, Intramuscular, PRNOra Loi, MD    glucose chewable tablet 16 g, 16 g, Oral, PRNOra Loi, MD    glucose chewable tablet 24 g, 24 g, Oral, PRNOra Loi, MD    hydrALAZINE injection 10 mg, 10 mg, Intravenous, Q6H PRNOra Loi, MD    HYDROcodone-acetaminophen 5-325 mg per tablet 1 tablet, 1 tablet, Oral, Q8H PRNJhon Riza E., MD    insulin aspart U-100 pen 0-10 Units, 0-10 Units, Subcutaneous, QID (AC + HS) PRNOra Loi, MD, 4 Units at 03/02/25 0555    insulin glargine U-100 (Lantus) pen 30 Units, 30 Units, Subcutaneous, QHS, Long Payan MD, 30 Units at 03/01/25 2050    metoprolol succinate (TOPROL-XL) 24 hr tablet 25 mg, 25 mg, Oral, Daily, Long Payan MD, 25 mg at 03/02/25 0854    mupirocin 2 % ointment, , Nasal, BID, Lindsey Ferreira MD, Given at 03/02/25 0853    mycophenolate tablet 500 mg, 500 mg, Oral, BID, Berlin Pacheco MD, 500 mg at 03/02/25 0854    ondansetron disintegrating tablet 4 mg, 4 mg, Oral, Q6H PRN, Long Payan MD    predniSONE tablet 5 mg, 5 mg, Oral, Daily, Long Payan MD, 5 mg at 03/02/25 0853    tacrolimus capsule 4 mg, 4 mg,  Oral, Daily AM, 4 mg at 03/02/25 0755 **AND** tacrolimus capsule 3 mg, 3 mg, Oral, Daily PM, Long Payan MD, 3 mg at 03/01/25 2484

## 2025-03-03 LAB
ANION GAP SERPL CALC-SCNC: 9 MMOL/L (ref 8–16)
BASOPHILS NFR BLD: 0 % (ref 0–1.9)
BUN SERPL-MCNC: 32 MG/DL (ref 8–23)
CALCIUM SERPL-MCNC: 8.9 MG/DL (ref 8.7–10.5)
CHLORIDE SERPL-SCNC: 109 MMOL/L (ref 95–110)
CO2 SERPL-SCNC: 24 MMOL/L (ref 23–29)
CREAT SERPL-MCNC: 2 MG/DL (ref 0.5–1.4)
DIFFERENTIAL METHOD BLD: ABNORMAL
EOSINOPHIL NFR BLD: 2 % (ref 0–8)
ERYTHROCYTE [DISTWIDTH] IN BLOOD BY AUTOMATED COUNT: 15.3 % (ref 11.5–14.5)
EST. GFR  (NO RACE VARIABLE): 35 ML/MIN/1.73 M^2
GIANT PLATELETS BLD QL SMEAR: PRESENT
GLUCOSE SERPL-MCNC: 212 MG/DL (ref 70–110)
HCT VFR BLD AUTO: 25.7 % (ref 40–54)
HGB BLD-MCNC: 7.6 G/DL (ref 14–18)
IMM GRANULOCYTES # BLD AUTO: ABNORMAL K/UL (ref 0–0.04)
IMM GRANULOCYTES NFR BLD AUTO: ABNORMAL % (ref 0–0.5)
LYMPHOCYTES NFR BLD: 18 % (ref 18–48)
MCH RBC QN AUTO: 24.1 PG (ref 27–31)
MCHC RBC AUTO-ENTMCNC: 29.6 G/DL (ref 32–36)
MCV RBC AUTO: 82 FL (ref 82–98)
METAMYELOCYTES NFR BLD MANUAL: 1 %
MONOCYTES NFR BLD: 10 % (ref 4–15)
NEUTROPHILS NFR BLD: 69 % (ref 38–73)
NRBC BLD-RTO: 0 /100 WBC
OVALOCYTES BLD QL SMEAR: ABNORMAL
PLATELET # BLD AUTO: 212 K/UL (ref 150–450)
PLATELET BLD QL SMEAR: ABNORMAL
PMV BLD AUTO: 11.2 FL (ref 9.2–12.9)
POCT GLUCOSE: 192 MG/DL (ref 70–110)
POCT GLUCOSE: 237 MG/DL (ref 70–110)
POCT GLUCOSE: 246 MG/DL (ref 70–110)
POCT GLUCOSE: 280 MG/DL (ref 70–110)
POIKILOCYTOSIS BLD QL SMEAR: SLIGHT
POTASSIUM SERPL-SCNC: 3.3 MMOL/L (ref 3.5–5.1)
RBC # BLD AUTO: 3.15 M/UL (ref 4.6–6.2)
SODIUM SERPL-SCNC: 142 MMOL/L (ref 136–145)
TACROLIMUS BLD-MCNC: 2.9 NG/ML (ref 5–15)
WBC # BLD AUTO: 2.81 K/UL (ref 3.9–12.7)

## 2025-03-03 PROCEDURE — 36415 COLL VENOUS BLD VENIPUNCTURE: CPT | Performed by: NURSE PRACTITIONER

## 2025-03-03 PROCEDURE — 21400001 HC TELEMETRY ROOM

## 2025-03-03 PROCEDURE — 99232 SBSQ HOSP IP/OBS MODERATE 35: CPT | Mod: ,,, | Performed by: INTERNAL MEDICINE

## 2025-03-03 PROCEDURE — 85007 BL SMEAR W/DIFF WBC COUNT: CPT | Performed by: FAMILY MEDICINE

## 2025-03-03 PROCEDURE — 80048 BASIC METABOLIC PNL TOTAL CA: CPT | Performed by: NURSE PRACTITIONER

## 2025-03-03 PROCEDURE — 25000003 PHARM REV CODE 250: Performed by: INTERNAL MEDICINE

## 2025-03-03 PROCEDURE — 25000003 PHARM REV CODE 250: Performed by: FAMILY MEDICINE

## 2025-03-03 PROCEDURE — 85027 COMPLETE CBC AUTOMATED: CPT | Performed by: FAMILY MEDICINE

## 2025-03-03 PROCEDURE — 36415 COLL VENOUS BLD VENIPUNCTURE: CPT | Performed by: FAMILY MEDICINE

## 2025-03-03 PROCEDURE — 63600175 PHARM REV CODE 636 W HCPCS: Performed by: FAMILY MEDICINE

## 2025-03-03 PROCEDURE — 63600175 PHARM REV CODE 636 W HCPCS: Performed by: INTERNAL MEDICINE

## 2025-03-03 RX ORDER — POTASSIUM CHLORIDE 20 MEQ/1
20 TABLET, EXTENDED RELEASE ORAL ONCE
Status: COMPLETED | OUTPATIENT
Start: 2025-03-03 | End: 2025-03-03

## 2025-03-03 RX ADMIN — PREDNISONE 5 MG: 5 TABLET ORAL at 09:03

## 2025-03-03 RX ADMIN — TACROLIMUS 4 MG: 1 CAPSULE ORAL at 08:03

## 2025-03-03 RX ADMIN — METOPROLOL SUCCINATE 25 MG: 25 TABLET, EXTENDED RELEASE ORAL at 09:03

## 2025-03-03 RX ADMIN — INSULIN ASPART 3 UNITS: 100 INJECTION, SOLUTION INTRAVENOUS; SUBCUTANEOUS at 11:03

## 2025-03-03 RX ADMIN — INSULIN ASPART 2 UNITS: 100 INJECTION, SOLUTION INTRAVENOUS; SUBCUTANEOUS at 06:03

## 2025-03-03 RX ADMIN — MUPIROCIN: 20 OINTMENT TOPICAL at 11:03

## 2025-03-03 RX ADMIN — TACROLIMUS 3 MG: 1 CAPSULE ORAL at 05:03

## 2025-03-03 RX ADMIN — MUPIROCIN: 20 OINTMENT TOPICAL at 09:03

## 2025-03-03 RX ADMIN — CEFTRIAXONE 1 G: 1 INJECTION, POWDER, FOR SOLUTION INTRAMUSCULAR; INTRAVENOUS at 01:03

## 2025-03-03 RX ADMIN — INSULIN ASPART 4 UNITS: 100 INJECTION, SOLUTION INTRAVENOUS; SUBCUTANEOUS at 11:03

## 2025-03-03 RX ADMIN — INSULIN ASPART 4 UNITS: 100 INJECTION, SOLUTION INTRAVENOUS; SUBCUTANEOUS at 05:03

## 2025-03-03 RX ADMIN — CALCITRIOL CAPSULES 0.25 MCG 0.25 MCG: 0.25 CAPSULE ORAL at 09:03

## 2025-03-03 RX ADMIN — FERROUS SULFATE TAB 325 MG (65 MG ELEMENTAL FE) 1 EACH: 325 (65 FE) TAB at 09:03

## 2025-03-03 RX ADMIN — INSULIN GLARGINE 30 UNITS: 100 INJECTION, SOLUTION SUBCUTANEOUS at 11:03

## 2025-03-03 RX ADMIN — POTASSIUM CHLORIDE 20 MEQ: 1500 TABLET, EXTENDED RELEASE ORAL at 05:03

## 2025-03-03 RX ADMIN — MYCOPHENOLATE MOFETIL 500 MG: 500 TABLET, FILM COATED ORAL at 11:03

## 2025-03-03 RX ADMIN — MYCOPHENOLATE MOFETIL 500 MG: 500 TABLET, FILM COATED ORAL at 09:03

## 2025-03-03 NOTE — ASSESSMENT & PLAN NOTE
ANGELITO is likely due to pre-renal azotemia due to dehydration. Baseline creatinine is  1.4 . Most recent creatinine and eGFR are listed below.  Recent Labs     03/01/25  0441 03/02/25  0555 03/03/25  1049   CREATININE 2.6* 2.2* 2.0*   EGFRNORACEVR 26* 31* 35*        Plan  Hold diuretics   Monitor urine output   Urine sodium and creatinine   Consult Nephrology on case   Continue IVF  Patient may need renal biopsy    3/2/25  Creatinine improving   Discontinue intravenous fluids per nephrology   Nephrology on case    3/3/25  Continues to improve.   Hold Entresto and diuretics

## 2025-03-03 NOTE — PROGRESS NOTES
[facility] Medicine  Progress Note    Patient Name: Mitch Whittaker  MRN: [id number]  Patient Class: IP- Inpatient   Admission Date: 2/26/2025  Length of Stay: 5 days  Attending Physician: Lindsey Ferreira MD  Primary Care Provider: Valery Caal MD    Subjective     Principal Problem:ANGELITO (acute kidney injury)    HPI:  Patient is a 69-year-old  male with a PMH of CHF, kidney transplant, CAD, DM, DVT who presents to the ED with complaints of weakness.  Patient is a resident at [facility] ([place]  He states he was sent here for renal failure.  Patient does report still producing urine however it is decreased.  Does complain of some dysuria.  Reports that oral intake is good.  Denies any fever or chills.  No other issues reported at this time.    In the ED, H&H 8.0/27, K:  2.9, BUN/CR 60/4.3.  ED discussed with Dr. Gonsalez who recommended fluid challenge due to concern with over-diuresis.      Overview/Hospital Course:  02/27/2025  Will start empiric Rocephin for UTI.  Creatinine stable.  Continue IVF.  Nephrology on case.  Patient may need renal biopsy.  02/28/2025  Creatinine trending down.  Will continue IVF.  Gross hematuria improving.  Continue Rocephin for UTI.  3/1 creatinine continues to improve. Discontinue intravenous fluids per nephrology. Discontinue gan per nephrology, monitor for urinary retention. Discussed blood transfusion with nephrology and patient also agreeable. No transfusion reaction in the past.  3/2 urology consulted. hematuria improving. Denies abdominal pain. Complains of weakness and cough and leg swelling. Physical/occupational therapy consulted. Creatinine improving  3/3: creatinine continues to improve. Entresto and diuretics remains on hold. Episode of hematuria with clots overnight, but resolved this AM. Outpatient follow up with Urology for cystoscopy. Continue Rocephin for UTI    Interval History: hematuria yesterday but non this AM. Orange urine noted  (On Pyridium)    Review of Systems   Constitutional:  Positive for activity change and fatigue. Negative for appetite change and fever.   Respiratory:  Positive for cough.    Cardiovascular:  Positive for leg swelling.   Genitourinary:  Negative for difficulty urinating, dysuria and hematuria.   Musculoskeletal:  Positive for arthralgias.   Allergic/Immunologic: Positive for immunocompromised state.   Neurological:  Positive for weakness.   Psychiatric/Behavioral:  Negative for agitation, behavioral problems, confusion, decreased concentration and dysphoric mood.      Objective:     Vital Signs (Most Recent):  Temp: 99.1 °F (37.3 °C) (03/03/25 1601)  Pulse: 98 (03/03/25 1601)  Resp: 18 (03/03/25 1601)  BP: 117/82 (03/03/25 1601)  SpO2: 97 % (03/03/25 1601) Vital Signs (24h Range):  Temp:  [98.1 °F (36.7 °C)-99.1 °F (37.3 °C)] 99.1 °F (37.3 °C)  Pulse:  [] 98  Resp:  [17-20] 18  SpO2:  [96 %-98 %] 97 %  BP: (105-132)/(56-87) 117/82     Weight: 120.7 kg (266 lb 1.5 oz)  Body mass index is 36.09 kg/m².  No intake or output data in the 24 hours ending 03/03/25 1650      Physical Exam  Vitals and nursing note reviewed.   Constitutional:       General: He is not in acute distress.     Appearance: He is ill-appearing. He is not toxic-appearing.   HENT:      Head: Normocephalic and atraumatic.   Cardiovascular:      Rate and Rhythm: Normal rate.   Pulmonary:      Effort: Pulmonary effort is normal. No respiratory distress.   Abdominal:      Palpations: Abdomen is soft.      Tenderness: There is no abdominal tenderness.   Musculoskeletal:      Right lower leg: Edema present.      Left lower leg: Edema present.   Skin:     General: Skin is warm.   Neurological:      Mental Status: He is alert and oriented to person, place, and time.      Motor: Weakness present.          Significant Labs: All pertinent labs within the past 24 hours have been reviewed.  CBC:   Recent Labs   Lab 03/02/25  1507 03/03/25  0650   WBC  --   2.81*   HGB 8.8* 7.6*   HCT 30.3* 25.7*   PLT  --  212     CMP:   Recent Labs   Lab 03/02/25  0555 03/03/25  1049    142   K 3.6 3.3*   * 109   CO2 21* 24   * 212*   BUN 36* 32*   CREATININE 2.2* 2.0*   CALCIUM 8.8 8.9   PROT 4.9*  --    ALBUMIN 1.6*  --    BILITOT 0.4  --    ALKPHOS 104  --    AST 17  --    ALT 23  --    ANIONGAP 10 9       Significant Imaging: I have reviewed all pertinent imaging results/findings within the past 24 hours.    Assessment and Plan     * ANGELITO (acute kidney injury)  ANGELITO is likely due to pre-renal azotemia due to dehydration. Baseline creatinine is  1.4 . Most recent creatinine and eGFR are listed below.  Recent Labs     03/01/25  0441 03/02/25  0555 03/03/25  1049   CREATININE 2.6* 2.2* 2.0*   EGFRNORACEVR 26* 31* 35*        Plan  Hold diuretics   Monitor urine output   Urine sodium and creatinine   Consult Nephrology on case   Continue IVF  Patient may need renal biopsy    3/2/25  Creatinine improving   Discontinue intravenous fluids per nephrology   Nephrology on case    3/3/25  Continues to improve.   Hold Entresto and diuretics    Anemia, chronic disease  Anemia is likely due to acute blood loss which was from hematuria and chronic disease due to Chronic Kidney Disease. Most recent hemoglobin and hematocrit are listed below.  Recent Labs     03/01/25  0441 03/02/25  1507 03/03/25  0650   HGB 7.3* 8.8* 7.6*   HCT 25.9* 30.3* 25.7*       Plan  - Monitor serial CBC: Daily  - Transfuse PRBC if patient becomes hemodynamically unstable, symptomatic or H/H drops below 7/21.  - Patient has received 1 units of PRBCs on 3/1/25  - Patient's anemia is currently stable  - cbc in am    UTI (urinary tract infection)  Continue Rocephin       Gross hematuria  Possibly related to Gan insertion   Will continue to monitor   should hematuria persists will plan to consult Urology on case  Hematuria improving  Discontinue gan  Agreeable to blood transfusion  Urology note  "reviewed  Continue intravenous antibiotic(s)   Will need outpatient urology for cystoscopy       Chronic cough  Follows pulmonology outpatient  Former smoker  Complains of dry cough but no dyspnea  Schedule breathing treatments and monitor response      Deep vein thrombosis (DVT) of lower extremity  Due to hematuria   Will hold anticoagulation for now  Hb/hct in am       Chronic combined systolic and diastolic congestive heart failure  Patient has Combined Systolic and Diastolic heart failure that is Chronic. On presentation their CHF was well compensated. Most recent BNP and echo results are listed below.  No results for input(s): "BNP" in the last 72 hours.    Latest ECHO  Results for orders placed during the hospital encounter of 11/26/24    Echo    Interpretation Summary    Left Ventricle: The left ventricle is normal in size. Normal wall thickness. There is concentric hypertrophy. Normal wall motion. Septal motion is consistent with bundle branch block. There is moderately reduced systolic function. Ejection fraction is approximately 35%. Grade I diastolic dysfunction.    Right Ventricle: Normal right ventricular cavity size. Wall thickness is normal. Systolic function is normal.    IVC/SVC: Normal venous pressure at 3 mmHg.    Current Heart Failure Medications  metoprolol succinate (TOPROL-XL) 24 hr tablet 25 mg, Daily, Oral  hydrALAZINE injection 10 mg, Every 6 hours PRN, Intravenous    Plan  - Monitor strict I&Os and daily weights.    - Place on telemetry  - Low sodium diet  - Place on fluid restriction of 1.5 L.   - Cardiology has not been consulted  - The patient's volume status is at their baseline  - patient appears euvolemic    One time bumex after blood transfusion      Type II diabetes mellitus with renal manifestations  Patient's FSGs are controlled on current medication regimen.  Last A1c reviewed-   Lab Results   Component Value Date    HGBA1C 5.7 (H) 12/17/2024     Most recent fingerstick glucose " reviewed-   Recent Labs   Lab 03/02/25  2140 03/03/25  0444 03/03/25  1135   POCTGLUCOSE 235* 192* 237*       Current correctional scale  Medium  Maintain anti-hyperglycemic dose as follows-   Antihyperglycemics (From admission, onward)      Start     Stop Route Frequency Ordered    02/26/25 2100  insulin glargine U-100 (Lantus) pen 30 Units         -- SubQ Nightly 02/26/25 1451    02/26/25 1551  insulin aspart U-100 pen 0-10 Units         -- SubQ Before meals & nightly PRN 02/26/25 1451          Hold Oral hypoglycemics while patient is in the hospital.  Continue adjusting insulin needs as indicated     Chronic immunosuppression with Prograf, MMF and prednisone  Will hold CellCept for now   Continue Prograf   Prograf levels 3.4  Continue prednisone      Class 3 severe obesity due to excess calories with body mass index (BMI) of 40.0 to 44.9 in adult  Body mass index is 36.09 kg/m². Morbid obesity complicates all aspects of disease management from diagnostic modalities to treatment. Weight loss encouraged and health benefits explained to patient.   Physical/occupational therapy         VTE Risk Mitigation (From admission, onward)           Ordered     Place sequential compression device  Until discontinued         02/26/25 1607                    Discharge Planning   GRETEL:      Code Status: Full Code   Medical Readiness for Discharge Date:   Discharge Plan A: Skilled Nursing Facility          Darya Maravilla NP  Department of Hospital Medicine   [facility] - Med Surg

## 2025-03-03 NOTE — PLAN OF CARE
03/03/25 0856   Rounds   Attendance Provider;;Charge nurse;Occupational therapist;Physical therapist   Discharge Plan A Skilled Nursing Facility   Why the patient remains in the hospital Requires continued medical care   Transition of Care Barriers None     Pending Yunior Rashid acceptance.

## 2025-03-03 NOTE — ASSESSMENT & PLAN NOTE
Anemia is likely due to acute blood loss which was from hematuria and chronic disease due to Chronic Kidney Disease. Most recent hemoglobin and hematocrit are listed below.  Recent Labs     03/01/25  0441 03/02/25  1507 03/03/25  0650   HGB 7.3* 8.8* 7.6*   HCT 25.9* 30.3* 25.7*       Plan  - Monitor serial CBC: Daily  - Transfuse PRBC if patient becomes hemodynamically unstable, symptomatic or H/H drops below 7/21.  - Patient has received 1 units of PRBCs on 3/1/25  - Patient's anemia is currently stable  - cbc in am   NOTIFICATION RETURN TO WORK / SCHOOL 
 
8/24/2017 12:15 PM 
 
Ms. Alie Haas [address] [place] [id number] To Whom It May Concern: 
 
Alie Haas is currently under the care of [facility]. She will return to work/school on: 0825/2017 No heavy lifting or bending over at the waist till after 8/31/2017. If there are questions or concerns please have the patient contact our office.  
 
 
 
Sincerely, 
 
 
Chantal Ngo MD

## 2025-03-03 NOTE — PLAN OF CARE
03/03/25 0854   Post-Acute Status   Post-Acute Authorization Placement   Post-Acute Placement Status Referrals Sent   Coverage bcbs   Discharge Plan   Discharge Plan A Skilled Nursing Facility     HECTOR spoke with pt regarding snf. Pt stated he would like to go back to [facility]. SW sent an referral to [facility]. Pending approval.   9:12AM HECTOR spoke with Lelia at [facility] who stated once they accept pt, she can submit for auth. Pending acceptance and resubmission for auth.

## 2025-03-03 NOTE — PROGRESS NOTES
"Nephrology Progress Note     History of Present Illness     69 y.o. 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  Patient has a routine nephrology follow up visit today and part of his appointment he had lab work done that revealed acute on chronic renal failure, patient was advised to come to the emergency room for further evaluation.  According to the family patient has been in the [facility] rehab facility for some lower extremity weakness.  Admission labs revealed a BUN of 60 and a serum creatinine of 4.3 mg/dL.  Patient denies any pain in his abdomen or tenderness over his allograft.  He has a Gan catheter was placed in the ER that has bloody urine.  Blood pressure was slightly low on presentation which improved in the ER after fluid bolus.  We are consulted for acute on chronic renal failure        Interval History     Overnight/currently: chart reviewed; lying in bed at 30 degrees in nad; feels "all right"; denies difficulty voiding - gan now removed; now with purewick - he was not collecting his urine.  Cr a bit better; no cp, sob, f/c     Health Status   Allergies:    is allergic to lisinopril, actos  [pioglitazone], and metformin.    Current medications:   Current Medications[1]     Physical Examination   VS/Measurements   /60 (BP Location: Right arm, Patient Position: Lying)   Pulse 104   Temp 98.1 °F (36.7 °C) (Oral)   Resp 18   Ht 6' (1.829 m)   Wt 120.7 kg (266 lb 1.5 oz)   SpO2 96%   BMI 36.09 kg/m²      General:  Alert and No acute distress.         Nutritional status: Obese.    Neck:  Supple  Respiratory:  Lungs are clear to auscultation, Respirations are " non-labored, Symmetrical chest wall expansion.    Cardiovascular:  Normal rate, Regular rhythm.   Gastrointestinal:  Soft, Non-tender, Normal bowel sounds. Non tender allograft  Integumentary:  Warm, Dry.   Psychiatric:  Cooperative, Appropriate mood & affect.        Review / Management   Laboratory Results   Today's Lab Results :    Recent Results (from the past 24 hours)   Hemoglobin    Collection Time: 03/02/25  3:07 PM   Result Value Ref Range    Hemoglobin 8.8 (L) 14.0 - 18.0 g/dL   Hematocrit    Collection Time: 03/02/25  3:07 PM   Result Value Ref Range    Hematocrit 30.3 (L) 40.0 - 54.0 %   POCT glucose    Collection Time: 03/02/25  4:15 PM   Result Value Ref Range    POCT Glucose 225 (H) 70 - 110 mg/dL   POCT glucose    Collection Time: 03/02/25  9:40 PM   Result Value Ref Range    POCT Glucose 235 (H) 70 - 110 mg/dL   POCT glucose    Collection Time: 03/03/25  4:44 AM   Result Value Ref Range    POCT Glucose 192 (H) 70 - 110 mg/dL   CBC auto differential    Collection Time: 03/03/25  6:50 AM   Result Value Ref Range    WBC 2.81 (L) 3.90 - 12.70 K/uL    RBC 3.15 (L) 4.60 - 6.20 M/uL    Hemoglobin 7.6 (L) 14.0 - 18.0 g/dL    Hematocrit 25.7 (L) 40.0 - 54.0 %    MCV 82 82 - 98 fL    MCH 24.1 (L) 27.0 - 31.0 pg    MCHC 29.6 (L) 32.0 - 36.0 g/dL    RDW 15.3 (H) 11.5 - 14.5 %    Platelets 212 150 - 450 K/uL    MPV 11.2 9.2 - 12.9 fL    Immature Granulocytes CANCELED 0.0 - 0.5 %    Immature Grans (Abs) CANCELED 0.00 - 0.04 K/uL    nRBC 0 0 /100 WBC    Gran % 69.0 38.0 - 73.0 %    Lymph % 18.0 18.0 - 48.0 %    Mono % 10.0 4.0 - 15.0 %    Eosinophil % 2.0 0.0 - 8.0 %    Basophil % 0.0 0.0 - 1.9 %    Metamyelocytes 1.0 %    Platelet Estimate Appears normal     Poik Slight     Ovalocytes Occasional     Large/Giant Platelets Present     Differential Method Manual    Basic metabolic panel    Collection Time: 03/03/25 10:49 AM   Result Value Ref Range    Sodium 142 136 - 145 mmol/L    Potassium 3.3 (L) 3.5 - 5.1 mmol/L     Chloride 109 95 - 110 mmol/L    CO2 24 23 - 29 mmol/L    Glucose 212 (H) 70 - 110 mg/dL    BUN 32 (H) 8 - 23 mg/dL    Creatinine 2.0 (H) 0.5 - 1.4 mg/dL    Calcium 8.9 8.7 - 10.5 mg/dL    Anion Gap 9 8 - 16 mmol/L    eGFR 35 (A) >60 mL/min/1.73 m^2   POCT glucose    Collection Time: 03/03/25 11:35 AM   Result Value Ref Range    POCT Glucose 237 (H) 70 - 110 mg/dL        Impression and Plan   Diagnosis     ANGELITO on CKD 3 secondary to possible ATN from hypotension  - baseline serum creatinine about 1.5 mg/dL, admission creatinine 4.3.   -his creatinine is improving .I have advised him to stay adequately hydrated.  -FENa 2.0%  -Entresto currently on hold due to ANGELITO     Gross hematuria secondary to Ann trauma.  His Ann catheter discontinued.  He is making urine. Follow uop; check pvr if uop drops.  On abx;  following     S/p LRDKT in 2015, continue immunosuppression with Prograf 4/3, target Prograf level 4-7, prednisone 5 mg daily and CellCept 500 mg twice a day.  -his Prograf level was slightly low but in the acceptable range.  We will repeat his Prograf again.  His kidney ultrasound did not reveal any hydro.  His renal artery Doppler did not reveal any stenosis in the main artery.     Hypertension.  His blood pressure is acceptable.     Hypokalemia.  We will replace.  Check mag     HFrEF.  Last echo reports LV ejection fraction about 35%.  He is off IV fluids.      Anemia.  Likely multifactorial.  Monitor hemoglobin.  PRBC transfusion when indicated.  On oral iron.    Leukopenia - follow; on mmf    ______________________________________________  Noel Coronado MD    This document was created using voice recognition software.  It is possible that there are errors which have persisted after original proofreading.  If there is a question regarding contents of this document please contact me for clarification.         [1]   Current Facility-Administered Medications:     0.9%  NaCl infusion (for blood  administration), , Intravenous, Q24H PRN, Lindsey Ferreira MD    acetaminophen tablet 650 mg, 650 mg, Oral, Q6H PRN, Long Payan MD    amitriptyline tablet 25 mg, 25 mg, Oral, Nightly PRN, Long Payan MD    calcitRIOL capsule 0.25 mcg, 0.25 mcg, Oral, Daily, Long Payan MD, 0.25 mcg at 03/03/25 0945    cefTRIAXone injection 1 g, 1 g, Intravenous, Q24H, Long Payan MD, 1 g at 03/03/25 1342    dextromethorphan-guaiFENesin  mg/5 ml liquid 5 mL, 5 mL, Oral, Q4H PRN, Long Payan MD, 5 mL at 03/02/25 2153    dextrose 50% injection 12.5 g, 12.5 g, Intravenous, PRNOra Loi, MD    dextrose 50% injection 25 g, 25 g, Intravenous, PRNOra Loi, MD    ferrous sulfate tablet 1 each, 1 tablet, Oral, Daily, Long Payan MD, 1 each at 03/03/25 0945    glucagon (human recombinant) injection 1 mg, 1 mg, Intramuscular, PRN, Long Payan MD    glucose chewable tablet 16 g, 16 g, Oral, PRNOra Loi, MD    glucose chewable tablet 24 g, 24 g, Oral, PRN, Long Payan MD    hydrALAZINE injection 10 mg, 10 mg, Intravenous, Q6H PRN, Long Payan MD    HYDROcodone-acetaminophen 5-325 mg per tablet 1 tablet, 1 tablet, Oral, Q8H PRN, Lindsey Ferreira MD    insulin aspart U-100 pen 0-10 Units, 0-10 Units, Subcutaneous, QID (AC + HS) PRNOra Loi, MD, 4 Units at 03/03/25 1136    insulin glargine U-100 (Lantus) pen 30 Units, 30 Units, Subcutaneous, QHS, Long Payan MD, 30 Units at 03/02/25 2149    metoprolol succinate (TOPROL-XL) 24 hr tablet 25 mg, 25 mg, Oral, Daily, Long Payan MD, 25 mg at 03/03/25 0945    mupirocin 2 % ointment, , Nasal, BID, Lindsey Ferreira MD, Given at 03/03/25 0946    mycophenolate tablet 500 mg, 500 mg, Oral, BID, Berlin Pacheco MD, 500 mg at 03/03/25 0945    ondansetron disintegrating tablet 4 mg, 4 mg, Oral, Q6H PRN, Long Payan MD    predniSONE tablet 5 mg, 5 mg, Oral, Daily, Long Payan MD, 5 mg at 03/03/25 0945    tacrolimus capsule 4 mg, 4 mg, Oral, Daily AM, 4 mg at 03/03/25 0809 **AND** tacrolimus capsule 3 mg, 3 mg, Oral, Daily PM, Long Payan MD, 3 mg  at 03/02/25 1828

## 2025-03-03 NOTE — SUBJECTIVE & OBJECTIVE
Interval History: hematuria yesterday but non this AM. Orange urine noted (On Pyridium)    Review of Systems   Constitutional:  Positive for activity change and fatigue. Negative for appetite change and fever.   Respiratory:  Positive for cough.    Cardiovascular:  Positive for leg swelling.   Genitourinary:  Negative for difficulty urinating, dysuria and hematuria.   Musculoskeletal:  Positive for arthralgias.   Allergic/Immunologic: Positive for immunocompromised state.   Neurological:  Positive for weakness.   Psychiatric/Behavioral:  Negative for agitation, behavioral problems, confusion, decreased concentration and dysphoric mood.      Objective:     Vital Signs (Most Recent):  Temp: 99.1 °F (37.3 °C) (03/03/25 1601)  Pulse: 98 (03/03/25 1601)  Resp: 18 (03/03/25 1601)  BP: 117/82 (03/03/25 1601)  SpO2: 97 % (03/03/25 1601) Vital Signs (24h Range):  Temp:  [98.1 °F (36.7 °C)-99.1 °F (37.3 °C)] 99.1 °F (37.3 °C)  Pulse:  [] 98  Resp:  [17-20] 18  SpO2:  [96 %-98 %] 97 %  BP: (105-132)/(56-87) 117/82     Weight: 120.7 kg (266 lb 1.5 oz)  Body mass index is 36.09 kg/m².  No intake or output data in the 24 hours ending 03/03/25 1650      Physical Exam  Vitals and nursing note reviewed.   Constitutional:       General: He is not in acute distress.     Appearance: He is ill-appearing. He is not toxic-appearing.   HENT:      Head: Normocephalic and atraumatic.   Cardiovascular:      Rate and Rhythm: Normal rate.   Pulmonary:      Effort: Pulmonary effort is normal. No respiratory distress.   Abdominal:      Palpations: Abdomen is soft.      Tenderness: There is no abdominal tenderness.   Musculoskeletal:      Right lower leg: Edema present.      Left lower leg: Edema present.   Skin:     General: Skin is warm.   Neurological:      Mental Status: He is alert and oriented to person, place, and time.      Motor: Weakness present.          Significant Labs: All pertinent labs within the past 24 hours have been  reviewed.  CBC:   Recent Labs   Lab 03/02/25  1507 03/03/25  0650   WBC  --  2.81*   HGB 8.8* 7.6*   HCT 30.3* 25.7*   PLT  --  212     CMP:   Recent Labs   Lab 03/02/25  0555 03/03/25  1049    142   K 3.6 3.3*   * 109   CO2 21* 24   * 212*   BUN 36* 32*   CREATININE 2.2* 2.0*   CALCIUM 8.8 8.9   PROT 4.9*  --    ALBUMIN 1.6*  --    BILITOT 0.4  --    ALKPHOS 104  --    AST 17  --    ALT 23  --    ANIONGAP 10 9       Significant Imaging: I have reviewed all pertinent imaging results/findings within the past 24 hours.

## 2025-03-03 NOTE — ASSESSMENT & PLAN NOTE
Patient's FSGs are controlled on current medication regimen.  Last A1c reviewed-   Lab Results   Component Value Date    HGBA1C 5.7 (H) 12/17/2024     Most recent fingerstick glucose reviewed-   Recent Labs   Lab 03/02/25  2140 03/03/25  0444 03/03/25  1135   POCTGLUCOSE 235* 192* 237*       Current correctional scale  Medium  Maintain anti-hyperglycemic dose as follows-   Antihyperglycemics (From admission, onward)    Start     Stop Route Frequency Ordered    02/26/25 2100  insulin glargine U-100 (Lantus) pen 30 Units         -- SubQ Nightly 02/26/25 1451    02/26/25 1551  insulin aspart U-100 pen 0-10 Units         -- SubQ Before meals & nightly PRN 02/26/25 1451        Hold Oral hypoglycemics while patient is in the hospital.  Continue adjusting insulin needs as indicated

## 2025-03-03 NOTE — ASSESSMENT & PLAN NOTE
Possibly related to Gan insertion   Will continue to monitor   should hematuria persists will plan to consult Urology on case  Hematuria improving  Discontinue gan  Agreeable to blood transfusion  Urology note reviewed  Continue intravenous antibiotic(s)   Will need outpatient urology for cystoscopy

## 2025-03-04 LAB
ACANTHOCYTES BLD QL SMEAR: PRESENT
ALBUMIN SERPL BCP-MCNC: 1.6 G/DL (ref 3.5–5.2)
ANION GAP SERPL CALC-SCNC: 7 MMOL/L (ref 8–16)
BASOPHILS NFR BLD: 0 % (ref 0–1.9)
BUN SERPL-MCNC: 30 MG/DL (ref 8–23)
CALCIUM SERPL-MCNC: 8.1 MG/DL (ref 8.7–10.5)
CHLORIDE SERPL-SCNC: 111 MMOL/L (ref 95–110)
CO2 SERPL-SCNC: 23 MMOL/L (ref 23–29)
CREAT SERPL-MCNC: 1.8 MG/DL (ref 0.5–1.4)
DIFFERENTIAL METHOD BLD: ABNORMAL
EOSINOPHIL NFR BLD: 2 % (ref 0–8)
ERYTHROCYTE [DISTWIDTH] IN BLOOD BY AUTOMATED COUNT: 15.3 % (ref 11.5–14.5)
EST. GFR  (NO RACE VARIABLE): 40 ML/MIN/1.73 M^2
GLUCOSE SERPL-MCNC: 233 MG/DL (ref 70–110)
HCT VFR BLD AUTO: 26 % (ref 40–54)
HGB BLD-MCNC: 7.4 G/DL (ref 14–18)
IMM GRANULOCYTES # BLD AUTO: ABNORMAL K/UL (ref 0–0.04)
IMM GRANULOCYTES NFR BLD AUTO: ABNORMAL % (ref 0–0.5)
LYMPHOCYTES NFR BLD: 17 % (ref 18–48)
MAGNESIUM SERPL-MCNC: 1.5 MG/DL (ref 1.6–2.6)
MCH RBC QN AUTO: 24.2 PG (ref 27–31)
MCHC RBC AUTO-ENTMCNC: 28.5 G/DL (ref 32–36)
MCV RBC AUTO: 85 FL (ref 82–98)
MONOCYTES NFR BLD: 7 % (ref 4–15)
MYELOCYTES NFR BLD MANUAL: 2 %
NEUTROPHILS NFR BLD: 72 % (ref 38–73)
NRBC BLD-RTO: 0 /100 WBC
OVALOCYTES BLD QL SMEAR: ABNORMAL
PHOSPHATE SERPL-MCNC: 2.9 MG/DL (ref 2.7–4.5)
PLATELET # BLD AUTO: 217 K/UL (ref 150–450)
PLATELET BLD QL SMEAR: ABNORMAL
PMV BLD AUTO: 11 FL (ref 9.2–12.9)
POCT GLUCOSE: 166 MG/DL (ref 70–110)
POCT GLUCOSE: 174 MG/DL (ref 70–110)
POCT GLUCOSE: 212 MG/DL (ref 70–110)
POCT GLUCOSE: 230 MG/DL (ref 70–110)
POIKILOCYTOSIS BLD QL SMEAR: SLIGHT
POTASSIUM SERPL-SCNC: 3.4 MMOL/L (ref 3.5–5.1)
RBC # BLD AUTO: 3.06 M/UL (ref 4.6–6.2)
SODIUM SERPL-SCNC: 141 MMOL/L (ref 136–145)
WBC # BLD AUTO: 2.66 K/UL (ref 3.9–12.7)

## 2025-03-04 PROCEDURE — 97530 THERAPEUTIC ACTIVITIES: CPT

## 2025-03-04 PROCEDURE — 63600175 PHARM REV CODE 636 W HCPCS: Performed by: INTERNAL MEDICINE

## 2025-03-04 PROCEDURE — 85027 COMPLETE CBC AUTOMATED: CPT | Performed by: FAMILY MEDICINE

## 2025-03-04 PROCEDURE — 83735 ASSAY OF MAGNESIUM: CPT | Performed by: INTERNAL MEDICINE

## 2025-03-04 PROCEDURE — 21400001 HC TELEMETRY ROOM

## 2025-03-04 PROCEDURE — 85007 BL SMEAR W/DIFF WBC COUNT: CPT | Performed by: FAMILY MEDICINE

## 2025-03-04 PROCEDURE — 25000003 PHARM REV CODE 250: Performed by: NURSE PRACTITIONER

## 2025-03-04 PROCEDURE — 97166 OT EVAL MOD COMPLEX 45 MIN: CPT

## 2025-03-04 PROCEDURE — 36415 COLL VENOUS BLD VENIPUNCTURE: CPT | Performed by: INTERNAL MEDICINE

## 2025-03-04 PROCEDURE — 80069 RENAL FUNCTION PANEL: CPT | Performed by: INTERNAL MEDICINE

## 2025-03-04 PROCEDURE — 97110 THERAPEUTIC EXERCISES: CPT

## 2025-03-04 PROCEDURE — 63600175 PHARM REV CODE 636 W HCPCS: Performed by: NURSE PRACTITIONER

## 2025-03-04 PROCEDURE — 63600175 PHARM REV CODE 636 W HCPCS: Performed by: FAMILY MEDICINE

## 2025-03-04 PROCEDURE — 99232 SBSQ HOSP IP/OBS MODERATE 35: CPT | Mod: ,,, | Performed by: INTERNAL MEDICINE

## 2025-03-04 PROCEDURE — 25000003 PHARM REV CODE 250: Performed by: FAMILY MEDICINE

## 2025-03-04 RX ORDER — MAGNESIUM SULFATE 1 G/100ML
1 INJECTION INTRAVENOUS ONCE
Status: COMPLETED | OUTPATIENT
Start: 2025-03-04 | End: 2025-03-04

## 2025-03-04 RX ORDER — CIPROFLOXACIN HYDROCHLORIDE 3 MG/ML
1 SOLUTION/ DROPS OPHTHALMIC EVERY 6 HOURS
Status: DISCONTINUED | OUTPATIENT
Start: 2025-03-04 | End: 2025-03-12 | Stop reason: HOSPADM

## 2025-03-04 RX ORDER — INSULIN GLARGINE 100 [IU]/ML
20 INJECTION, SOLUTION SUBCUTANEOUS 2 TIMES DAILY
Status: DISCONTINUED | OUTPATIENT
Start: 2025-03-04 | End: 2025-03-08

## 2025-03-04 RX ADMIN — CIPROFLOXACIN HYDROCHLORIDE 1 DROP: 3 SOLUTION/ DROPS OPHTHALMIC at 05:03

## 2025-03-04 RX ADMIN — HYDROCODONE BITARTRATE AND ACETAMINOPHEN 1 TABLET: 5; 325 TABLET ORAL at 04:03

## 2025-03-04 RX ADMIN — INSULIN ASPART 2 UNITS: 100 INJECTION, SOLUTION INTRAVENOUS; SUBCUTANEOUS at 09:03

## 2025-03-04 RX ADMIN — CALCITRIOL CAPSULES 0.25 MCG 0.25 MCG: 0.25 CAPSULE ORAL at 08:03

## 2025-03-04 RX ADMIN — MAGNESIUM SULFATE IN DEXTROSE 1 G: 10 INJECTION, SOLUTION INTRAVENOUS at 04:03

## 2025-03-04 RX ADMIN — TACROLIMUS 4 MG: 1 CAPSULE ORAL at 07:03

## 2025-03-04 RX ADMIN — INSULIN ASPART 4 UNITS: 100 INJECTION, SOLUTION INTRAVENOUS; SUBCUTANEOUS at 06:03

## 2025-03-04 RX ADMIN — METOPROLOL SUCCINATE 25 MG: 25 TABLET, EXTENDED RELEASE ORAL at 08:03

## 2025-03-04 RX ADMIN — MUPIROCIN: 20 OINTMENT TOPICAL at 09:03

## 2025-03-04 RX ADMIN — MYCOPHENOLATE MOFETIL 500 MG: 500 TABLET, FILM COATED ORAL at 08:03

## 2025-03-04 RX ADMIN — MYCOPHENOLATE MOFETIL 500 MG: 500 TABLET, FILM COATED ORAL at 09:03

## 2025-03-04 RX ADMIN — MUPIROCIN: 20 OINTMENT TOPICAL at 08:03

## 2025-03-04 RX ADMIN — MAGNESIUM SULFATE IN DEXTROSE 1 G: 10 INJECTION, SOLUTION INTRAVENOUS at 09:03

## 2025-03-04 RX ADMIN — PREDNISONE 5 MG: 5 TABLET ORAL at 08:03

## 2025-03-04 RX ADMIN — INSULIN GLARGINE 20 UNITS: 100 INJECTION, SOLUTION SUBCUTANEOUS at 09:03

## 2025-03-04 RX ADMIN — INSULIN GLARGINE 20 UNITS: 100 INJECTION, SOLUTION SUBCUTANEOUS at 08:03

## 2025-03-04 RX ADMIN — FERROUS SULFATE TAB 325 MG (65 MG ELEMENTAL FE) 1 EACH: 325 (65 FE) TAB at 08:03

## 2025-03-04 RX ADMIN — INSULIN ASPART 2 UNITS: 100 INJECTION, SOLUTION INTRAVENOUS; SUBCUTANEOUS at 05:03

## 2025-03-04 RX ADMIN — TACROLIMUS 3 MG: 1 CAPSULE ORAL at 05:03

## 2025-03-04 NOTE — ASSESSMENT & PLAN NOTE
Possibly related to Gan insertion   Will continue to monitor   should hematuria persists will plan to consult Urology on case  Hematuria improving  Discontinue gan  Agreeable to blood transfusion  Urology note reviewed  Continue intravenous antibiotic(s)   Will need outpatient urology for cystoscopy     3/4/25  Intermittent episodes of hematuria.   No indication for further workup IP if no urinary retention per Urology.   Will follow up outpatient

## 2025-03-04 NOTE — PT/OT/SLP PROGRESS
"Physical Therapy  Treatment    Mitch Whittaker   MRN: [id number]   Admitting Diagnosis: ANGELITO (acute kidney injury)    PT Received On: 03/04/25  PT Start Time: 1010     PT Stop Time: 1033    PT Total Time (min): 23 min       Billable Minutes:  Therapeutic Activity 13 and Therapeutic Exercise 10    Treatment Type: Treatment  PT/PTA: PT     Number of PTA visits since last PT visit: 0       General Precautions: Standard, fall  Orthopedic Precautions: N/A  Braces: N/A  Respiratory Status: Room air         Subjective:  Communicated with NURSE RUEDA AND EPIC CHART REVIEW  prior to session.   PT AGREED TO TX HOWEVER CONT TO ASK P.T. NOT TO LET LE BEND.     Pain/Comfort  Pain Rating 1: 10/10  Location - Side 1: Bilateral  Location 1: leg  Pain Addressed 1: Cessation of Activity  Pain Rating Post-Intervention 1: 10/10    Objective:   Patient found with: telemetry, peripheral IV    Functional Mobility:  PT LOG ROLLED TO LEFT SIDE X 2 WITH M AX A X2. PT CONT REPORTED," STOP! , PLEASE DON'T LET ME LEG BEND." P.T. EDUCATED PT ON IMPORTANCE OF MOBILITY AND INC ORDER TO SIT EOB AND PROGRESS TO STANDING AND WALKING AGAIN PT WOULD NEED TO MOVE LE. " PT REPORTED TO MUCH PAIN AND NOT ABLE TO TOLERATE THIS . PT RETURNED SUP IN BED AND SCOOTED TO HOB WITH MIN A AND PRESSURE RELIEF BOOTS DONNED. PT AGREED TO AAROM     Treatment and Education:   PT COMPLETED AAROM AP, HS AND HIP ADD/ ABD. PT LEFT SUP IN BED WITH ALL NEEDS MET.     AM-PAC 6 CLICK MOBILITY  How much help from another person does this patient currently need?   1 = Unable, Total/Dependent Assistance  2 = A lot, Maximum/Moderate Assistance  3 = A little, Minimum/Contact Guard/Supervision  4 = None, Modified [place]/Independent    Turning over in bed (including adjusting bedclothes, sheets and blankets)?: 2  Sitting down on and standing up from a chair with arms (e.g., wheelchair, bedside commode, etc.): 1  Moving from lying on back to sitting on the side of the bed?: " 1  Moving to and from a bed to a chair (including a wheelchair)?: 1  Need to walk in hospital room?: 1  Climbing 3-5 steps with a railing?: 1  Basic Mobility Total Score: 7    AM-PAC Raw Score CMS G-Code Modifier Level of Impairment Assistance   6 % Total / Unable   7 - 9 CM 80 - 100% Maximal Assist   10 - 14 CL 60 - 80% Moderate Assist   15 - 19 CK 40 - 60% Moderate Assist   20 - 22 CJ 20 - 40% Minimal Assist   23 CI 1-20% SBA / CGA   24 CH 0% Independent/ Mod I     Patient left HOB elevated with call button in reach and bed alarm on.    Assessment:  PT RADHA MIN MOBILITY. PT APPEARS TO BE LIMITED GREATLY BY PAIN AND FEAR OF PAIN     Rehab identified problem list/impairments: weakness, impaired endurance, impaired self care skills, impaired functional mobility, gait instability, impaired balance, decreased ROM, pain, decreased safety awareness, decreased lower extremity function, decreased upper extremity function, decreased coordination, impaired skin, edema    Rehab potential is fair.    Activity tolerance: Poor    Discharge recommendations: Moderate Intensity Therapy      Barriers to discharge:      Equipment recommendations: none     GOALS:   Multidisciplinary Problems       Physical Therapy Goals          Problem: Physical Therapy    Goal Priority Disciplines Outcome Interventions   Physical Therapy Goal     PT, PT/OT     Description: LTG: 3/16/25  1. PT WILL LOG ROLL IN BED WITH MIN A  2. PT WILL SIT EOB WITH MOD A FOR SITTING TOLERANCE  3. PT WILL COMPLETE B LE TE X 10 REPS TO INC ROM AND STRENGTHENING  4. PT WILL INC AMPAC SCORE BY 2 POINTS TO PROGRESS GROSS FUNC MOBILITY.                          DME Justifications:  No DME recommended requiring DME justifications    PLAN:    Patient to be seen 3 x/week to address the above listed problems via therapeutic activities, therapeutic exercises  Plan of Care expires: 03/16/25  Plan of Care reviewed with: patient         03/04/2025

## 2025-03-04 NOTE — PROGRESS NOTES
Nephrology Progress Note     History of Present Illness     69 y.o. 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 Patient has a routine nephrology follow up visit today and part of his appointment he had lab work done that revealed acute on chronic renal failure, patient was advised to come to the emergency room for further evaluation. According to the family patient has been in the [facility] rehab facility for some lower extremity weakness. Admission labs revealed a BUN of 60 and a serum creatinine of 4.3 mg/dL. Patient denies any pain in his abdomen or tenderness over his allograft. He has a Ann catheter was placed in the ER that has bloody urine. Blood pressure was slightly low on presentation which improved in the ER after fluid bolus. We are consulted for acute on chronic renal failure     Interval History     Overnight/currently:  Chart reviewed.  Patient lying in bed at 30° in no distress.  He states that he is voiding without difficulty though urine output not being recorded.  He states his urine output is normal in quantity.  He does think it is still pink tinge did times with occasional clots.  He denied any symptoms of obstruction.  Tolerating p.o..  He voiced no complaints.  Creatinine improving towards baseline     Health Status   Allergies:    is allergic to lisinopril, actos  [pioglitazone], and metformin.    Current medications:   Current Medications[1]     Physical Examination   VS/Measurements   BP (!) 100/46 (Patient Position: Lying)   Pulse 85   Temp 97.7 °F (36.5 °C) (Oral)   Resp 20   Ht 6' (1.829 m)   Wt 104.6 kg (230 lb 9.6 oz)   SpO2 99%   BMI 31.28 kg/m²       General:  Alert and No acute distress.         Nutritional status: Obese.    Respiratory:  Lungs are clear to auscultation, Respirations are non-labored, Symmetrical chest wall expansion.    Cardiovascular:  Normal rate, Regular rhythm.   Gastrointestinal:  Soft, Non-tender, Normal bowel sounds.  Nontender allograft  Integumentary:  Warm, Dry.   Psychiatric:  Cooperative, Appropriate mood & affect.        Review / Management   Laboratory Results   Today's Lab Results :    Recent Results (from the past 24 hours)   POCT glucose    Collection Time: 03/03/25  5:19 PM   Result Value Ref Range    POCT Glucose 246 (H) 70 - 110 mg/dL   POCT glucose    Collection Time: 03/03/25 11:07 PM   Result Value Ref Range    POCT Glucose 280 (H) 70 - 110 mg/dL   Magnesium    Collection Time: 03/04/25  5:29 AM   Result Value Ref Range    Magnesium 1.5 (L) 1.6 - 2.6 mg/dL   Renal Function Panel    Collection Time: 03/04/25  5:29 AM   Result Value Ref Range    Glucose 233 (H) 70 - 110 mg/dL    Sodium 141 136 - 145 mmol/L    Potassium 3.4 (L) 3.5 - 5.1 mmol/L    Chloride 111 (H) 95 - 110 mmol/L    CO2 23 23 - 29 mmol/L    BUN 30 (H) 8 - 23 mg/dL    Calcium 8.1 (L) 8.7 - 10.5 mg/dL    Creatinine 1.8 (H) 0.5 - 1.4 mg/dL    Albumin 1.6 (L) 3.5 - 5.2 g/dL    Phosphorus 2.9 2.7 - 4.5 mg/dL    eGFR 40 (A) >60 mL/min/1.73 m^2    Anion Gap 7 (L) 8 - 16 mmol/L   CBC auto differential    Collection Time: 03/04/25  5:29 AM   Result Value Ref Range    WBC 2.66 (L) 3.90 - 12.70 K/uL    RBC 3.06 (L) 4.60 - 6.20 M/uL    Hemoglobin 7.4 (L) 14.0 - 18.0 g/dL    Hematocrit 26.0 (L) 40.0 - 54.0 %    MCV 85 82 - 98 fL    MCH 24.2 (L) 27.0 - 31.0 pg    MCHC 28.5 (L) 32.0 - 36.0 g/dL    RDW 15.3 (H) 11.5 - 14.5 %    Platelets 217 150 - 450 K/uL    MPV 11.0 9.2 - 12.9 fL    Immature Granulocytes CANCELED 0.0 - 0.5 %    Immature Grans (Abs) CANCELED 0.00 - 0.04 K/uL    nRBC 0 0 /100 WBC    Gran % 72.0 38.0 - 73.0 %    Lymph % 17.0 (L) 18.0 - 48.0 %    Mono % 7.0  4.0 - 15.0 %    Eosinophil % 2.0 0.0 - 8.0 %    Basophil % 0.0 0.0 - 1.9 %    Myelocytes 2.0 %    Platelet Estimate Appears normal     Poik Slight     Ovalocytes Occasional     Acanthocytes Present     Differential Method Manual    POCT glucose    Collection Time: 03/04/25  6:41 AM   Result Value Ref Range    POCT Glucose 212 (H) 70 - 110 mg/dL   POCT glucose    Collection Time: 03/04/25 11:36 AM   Result Value Ref Range    POCT Glucose 166 (H) 70 - 110 mg/dL        Impression and Plan   Diagnosis     ANGELITO on CKD 3 secondary to possible ATN from hypotension  - baseline serum creatinine about 1.5 mg/dL, admission creatinine 4.3.   -his creatinine is improving .I have advised him to stay adequately hydrated.  -FENa 2.0%  -Entresto currently on hold due to ANGELITO  --improving     Gross hematuria secondary to Ann trauma.  His Ann catheter discontinued.  He is making urine. Follow uop; check pvr if uop drops.  On abx;  following     S/p LRDKT in 2015, continue immunosuppression with Prograf 4/3, target Prograf level 4-7, prednisone 5 mg daily and CellCept 500 mg twice a day.  -his Prograf level was slightly low but in the acceptable range.  We will repeat his Prograf again.  His kidney ultrasound did not reveal any hydro.  His renal artery Doppler did not reveal any stenosis in the main artery.     Hypertension.  His blood pressure is acceptable.     Hypokalemia.  Mild.  Recheck after repleting magnesium      Hypomagnesemia.  Replete and then recheck in the morning     HFrEF.  Last echo reports LV ejection fraction about 35%.  He is off IV fluids.      Anemia.  Likely multifactorial.  Monitor hemoglobin.  PRBC transfusion when indicated.  On oral iron.     Leukopenia - follow; on mmf    --recheck Prograf level in the morning.  Monitor leukopenia.  Replete magnesium.    ______________________________________________  Noel Coronado MD    This document was created using voice recognition software.  It is possible  that there are errors which have persisted after original proofreading.  If there is a question regarding contents of this document please contact me for clarification.         [1]   Current Facility-Administered Medications:     0.9%  NaCl infusion (for blood administration), , Intravenous, Q24H PRN, Lindsey Ferreira MD    acetaminophen tablet 650 mg, 650 mg, Oral, Q6H PRN, Long Payan MD    amitriptyline tablet 25 mg, 25 mg, Oral, Nightly PRN, Long Payan MD    calcitRIOL capsule 0.25 mcg, 0.25 mcg, Oral, Daily, Long Payan MD, 0.25 mcg at 03/04/25 0856    dextromethorphan-guaiFENesin  mg/5 ml liquid 5 mL, 5 mL, Oral, Q4H PRN, Long Payan MD, 5 mL at 03/02/25 2153    dextrose 50% injection 12.5 g, 12.5 g, Intravenous, PRNOra Loi, MD    dextrose 50% injection 25 g, 25 g, Intravenous, PRNOra Loi, MD    ferrous sulfate tablet 1 each, 1 tablet, Oral, Daily, Long Payan MD, 1 each at 03/04/25 0856    glucagon (human recombinant) injection 1 mg, 1 mg, Intramuscular, PRNOra Loi, MD    glucose chewable tablet 16 g, 16 g, Oral, PRNOra Loi, MD    glucose chewable tablet 24 g, 24 g, Oral, PRNOra Loi, MD    hydrALAZINE injection 10 mg, 10 mg, Intravenous, Q6H PRNOra Loi, MD    HYDROcodone-acetaminophen 5-325 mg per tablet 1 tablet, 1 tablet, Oral, Q8H PRN, Lindsey Ferreira MD, 1 tablet at 03/04/25 0425    insulin aspart U-100 pen 0-10 Units, 0-10 Units, Subcutaneous, QID (AC + HS) PRNOra Loi, MD, 4 Units at 03/04/25 0642    insulin glargine U-100 (Lantus) pen 20 Units, 20 Units, Subcutaneous, BID, Darya Maravilla, NP, 20 Units at 03/04/25 0856    metoprolol succinate (TOPROL-XL) 24 hr tablet 25 mg, 25 mg, Oral, Daily, Long Payan MD, 25 mg at 03/04/25 0856    mupirocin 2 % ointment, , Nasal, BID, Lindsey Ferreira MD, Given at 03/04/25 0856    mycophenolate tablet 500 mg, 500 mg, Oral, BID, Berlin Pacheco MD, 500 mg at 03/04/25 0856    ondansetron disintegrating tablet 4 mg, 4 mg, Oral, Q6H PRN, Long Payan MD    predniSONE  tablet 5 mg, 5 mg, Oral, Daily, Long Payan MD, 5 mg at 03/04/25 0856    tacrolimus capsule 4 mg, 4 mg, Oral, Daily AM, 4 mg at 03/04/25 0748 **AND** tacrolimus capsule 3 mg, 3 mg, Oral, Daily PM, Long Payan MD, 3 mg at 03/03/25 8474

## 2025-03-04 NOTE — ASSESSMENT & PLAN NOTE
Patient's FSGs are controlled on current medication regimen.  Last A1c reviewed-   Lab Results   Component Value Date    HGBA1C 5.7 (H) 12/17/2024     Most recent fingerstick glucose reviewed-   Recent Labs   Lab 03/03/25  1719 03/03/25  2307 03/04/25  0641 03/04/25  1136   POCTGLUCOSE 246* 280* 212* 166*       Current correctional scale  Medium  Maintain anti-hyperglycemic dose as follows-   Antihyperglycemics (From admission, onward)      Start     Stop Route Frequency Ordered    03/04/25 0900  insulin glargine U-100 (Lantus) pen 20 Units         -- SubQ 2 times daily 03/04/25 0811    02/26/25 1551  insulin aspart U-100 pen 0-10 Units         -- SubQ Before meals & nightly PRN 02/26/25 1451          Hold Oral hypoglycemics while patient is in the hospital.  Continue adjusting insulin needs as indicated     3/4/25   Glucose above goal  Optimize long acting 20 units BID

## 2025-03-04 NOTE — ASSESSMENT & PLAN NOTE
Anemia is likely due to acute blood loss which was from hematuria and chronic disease due to Chronic Kidney Disease. Most recent hemoglobin and hematocrit are listed below.  Recent Labs     03/02/25  1507 03/03/25  0650 03/04/25  0529   HGB 8.8* 7.6* 7.4*   HCT 30.3* 25.7* 26.0*       Plan  - Monitor serial CBC: Daily  - Transfuse PRBC if patient becomes hemodynamically unstable, symptomatic or H/H drops below 7/21.  - Patient has received 1 units of PRBCs on 3/1/25  - Patient's anemia is currently stable  - cbc in am

## 2025-03-04 NOTE — ASSESSMENT & PLAN NOTE
ANGELITO is likely due to pre-renal azotemia due to dehydration. Baseline creatinine is  1.4 . Most recent creatinine and eGFR are listed below.  Recent Labs     03/02/25  0555 03/03/25  1049 03/04/25  0529   CREATININE 2.2* 2.0* 1.8*   EGFRNORACEVR 31* 35* 40*        Plan  Hold diuretics   Monitor urine output   Urine sodium and creatinine   Consult Nephrology on case   Continue IVF  Patient may need renal biopsy    3/2/25  Creatinine improving   Discontinue intravenous fluids per nephrology   Nephrology on case    3/3/25  Continues to improve.   Hold Entresto and diuretics    3/4/25  improving

## 2025-03-04 NOTE — SUBJECTIVE & OBJECTIVE
Interval History: intermittent episodes of hematuria no without urinary retention. Kidney function has returned to baseline. Extensive conversation held with spouse, Michelle, about hospitalization and expectations of SNF.     Review of Systems   Constitutional:  Positive for activity change and fatigue. Negative for appetite change and fever.   Respiratory:  Positive for cough.    Cardiovascular:  Positive for leg swelling.   Genitourinary:  Negative for difficulty urinating, dysuria and hematuria.   Musculoskeletal:  Positive for arthralgias.   Allergic/Immunologic: Positive for immunocompromised state.   Neurological:  Positive for weakness.   Psychiatric/Behavioral:  Negative for agitation, behavioral problems, confusion, decreased concentration and dysphoric mood.      Objective:     Vital Signs (Most Recent):  Temp: 97.7 °F (36.5 °C) (03/04/25 1205)  Pulse: 85 (03/04/25 1205)  Resp: 20 (03/04/25 1205)  BP: (!) 100/46 (03/04/25 1205)  SpO2: 99 % (03/04/25 1205) Vital Signs (24h Range):  Temp:  [97.7 °F (36.5 °C)-99.1 °F (37.3 °C)] 97.7 °F (36.5 °C)  Pulse:  [] 85  Resp:  [18-21] 20  SpO2:  [96 %-99 %] 99 %  BP: ()/(46-82) 100/46     Weight: 104.6 kg (230 lb 9.6 oz)  Body mass index is 31.28 kg/m².  No intake or output data in the 24 hours ending 03/04/25 1404      Physical Exam  Vitals and nursing note reviewed.   Constitutional:       General: He is not in acute distress.     Appearance: He is ill-appearing. He is not toxic-appearing.   HENT:      Head: Normocephalic and atraumatic.   Cardiovascular:      Rate and Rhythm: Normal rate.   Pulmonary:      Effort: Pulmonary effort is normal. No respiratory distress.   Abdominal:      Palpations: Abdomen is soft.      Tenderness: There is no abdominal tenderness.   Musculoskeletal:      Right lower leg: Edema present.      Left lower leg: Edema present.   Skin:     General: Skin is warm.   Neurological:      Mental Status: He is alert and oriented to  person, place, and time.      Motor: Weakness present.           Significant Labs: All pertinent labs within the past 24 hours have been reviewed.  CBC:   Recent Labs   Lab 03/02/25  1507 03/03/25  0650 03/04/25  0529   WBC  --  2.81* 2.66*   HGB 8.8* 7.6* 7.4*   HCT 30.3* 25.7* 26.0*   PLT  --  212 217     CMP:   Recent Labs   Lab 03/03/25  1049 03/04/25  0529    141   K 3.3* 3.4*    111*   CO2 24 23   * 233*   BUN 32* 30*   CREATININE 2.0* 1.8*   CALCIUM 8.9 8.1*   ALBUMIN  --  1.6*   ANIONGAP 9 7*       Significant Imaging: I have reviewed all pertinent imaging results/findings within the past 24 hours.

## 2025-03-04 NOTE — PT/OT/SLP EVAL
Occupational Therapy Evaluation and Treatment    Name: Mitch Whittaker  MRN: [id number]  Admitting Diagnosis: ANGELITO (acute kidney injury)  Recent Surgery: * No surgery found *      Recommendations:     Discharge Recommendations: Moderate Intensity Therapy  Level of Assistance Recommended: 24 hours light assistance  Discharge Equipment Recommendations: none  Barriers to discharge:      Assessment:     Mitch Whittaker is a 69 y.o. male with a medical diagnosis of ANGELITO (acute kidney injury). He presents with performance deficits affecting function including weakness, impaired balance, decreased safety awareness, impaired endurance, impaired self care skills, impaired functional mobility, decreased upper extremity function, gait instability, decreased lower extremity function.     Rehab Prognosis: Good; patient would benefit from acute OT services to address these deficits and reach maximum level of function.    Plan:     Patient to be seen 2 x/week to address the above listed problems via self-care/home management, therapeutic activities, therapeutic exercises  Plan of Care Expires: 03/18/25  Plan of Care Reviewed with: patient    Subjective     Chief Complaint: DEBILITY AND GENERALIZED WEAKNESS  Patient Comments/Goals: GET STRONGER  Pain/Comfort:  Pain Rating 1: 10/10    Patients cultural, spiritual, Adventist conflicts given the current situation:      Social History:  Living Environment: Patient lives in a skilled nursing facility   Prior Level of Function: Prior to admission, patient requires assistance with ADLs including AND MOBILITY  Roles and Routines: Patient was not driving and not working prior to admission.  Equipment Used at Home: none  DME owned (not currently used): none  Assistance Upon Discharge: facility staff    Objective:     Communicated with NURSE AND EPIC CHART REVIEW prior to session. Patient found HOB elevated with telemetry, peripheral IV upon OT entry to room.    General Precautions: Standard,  fall   Orthopedic Precautions: N/A   Braces: N/A    Respiratory Status: Room air    Occupational Performance        Bed Mobility:   Rolling/Turning to Left with moderate assistance  Rolling/Turning to Right with moderate assistance  Scooting to HOB in supine: minimum assistance      Activities of Daily Living:  Upper Body Dressing: MAX A  Lower Body Dressing: total assistance    Cognitive/Visual Perceptual:  Cognitive/Psychosocial Skills:    -     Oriented to: Person, Place, Time, Situation  -     Follows Commands/attention: Follows two-step commands  -     Communication: clear/fluent  -     Memory: No Deficits noted  -     Safety awareness/insight to disability: impaired    Physical Exam:  Upper Extremity Range of Motion:     -       Right Upper Extremity: WFL  -       Left Upper Extremity: WFL  Upper Extremity Strength:    -       Right Upper Extremity: MMT: 3/5 GROSSLY  -       Left Upper Extremity: MMT: 3/5 GROSSLY   Strength:    -       Right Upper Extremity: MMT: 3/5 GROSSLY  -       Left Upper Extremity: MMT: 3/5 GROSSLY    AMPAC 6 Click ADL:  AMPAC Total Score: 15    Treatment & Education:  Therapist provided facilitation and instruction of proper body mechanics, energy conservation, and fall prevention strategies during tasks listed above  Patient educated on role of OT, POC, and goals for therapy    Patient not clear to transfer with RN/PCT.    Patient left HOB elevated with all lines intact, call button in reach, RN notified, and P.T.  present.    GOALS:   Multidisciplinary Problems       Occupational Therapy Goals          Problem: Occupational Therapy    Goal Priority Disciplines Outcome Interventions   Occupational Therapy Goal     OT, PT/OT     Description: O.T. GOALS TO BE MET BY 3-18-25  PT WILL TOLERATE 1 SET X 15 REPS B UE ROM EXERCISE  MOD A WITH SUPINE<SIT TRANSFERS  MAX A WITH BSC TRANSFERS                       DME Justifications:  TBD DEPENDING ON PROGRESS    History:     Past Medical  History:   Diagnosis Date    Acquired renal cyst of left kidney     Anemia associated with chronic renal failure     CAD (coronary artery disease)     nonobstructive [facility] 9/14    CHF (congestive heart failure)     Chronic immunosuppression with Prograf and MMF 06/18/2015    Chronic venous insufficiency of lower extremity     CKD (chronic kidney disease) stage 3, GFR 30-59 ml/min     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 RNA NEGATIVE 5/2015, 2021, 2022    History of colon polyps     HPTH (hyperparathyroidism)     Hyperlipidemia     Hypertension associated with stage 3 chronic kidney disease due to type 2 diabetes mellitus     LBBB (left bundle branch block) 12/20/2021    Morbid obesity with BMI of 45.0-49.9, adult     Nephrolithiasis 6/7/2013    PCO (posterior capsular opacification), left 03/04/2019    Proteinuria     resolved s/p transplant    S/P kidney transplant     Sleep apnea     Type 2 diabetes, uncontrolled, with retinopathy     Type II diabetes mellitus with renal manifestations          Past Surgical History:   Procedure Laterality Date    CARDIAC CATHETERIZATION  01/01/2008    normal coronary    CARPAL TUNNEL RELEASE Right 12/01/2023    Procedure: RELEASE, CARPAL TUNNEL;  Surgeon: Noel Almonte MD;  Location: [facility] OR;  Service: Orthopedics;  Laterality: Right;    CARPAL TUNNEL RELEASE Left 7/18/2024    Procedure: RELEASE, CARPAL TUNNEL;  Surgeon: Noel Almonte MD;  Location: [facility] OR;  Service: Orthopedics;  Laterality: Left;    COLONOSCOPY N/A 04/05/2018    Procedure: COLONOSCOPY;  Surgeon: Chava Ronquillo MD;  Location: [facility] ENDO;  Service:  Endoscopy;  Laterality: N/A;    COLONOSCOPY N/A 05/02/2022    Procedure: COLONOSCOPY;  Surgeon: Alix Puente MD;  Location: [facility];  Service: Endoscopy;  Laterality: N/A;    COLONOSCOPY N/A 06/07/2023    Procedure: COLONOSCOPY - rule out CMV  Cardiac clearance/Eliquis hold approval received on 05/21/23 per Dr. Meade, cardiology.  Note in encounters.  LB;  Surgeon: Daniella Shah MD;  Location: [facility];  Service: Endoscopy;  Laterality: N/A;    ESOPHAGOGASTRODUODENOSCOPY N/A 03/26/2024    Procedure: EGD (ESOPHAGOGASTRODUODENOSCOPY) 3/1-pt cleared, ok to hold eliquis for 3 days;  Surgeon: Daniella Shah MD;  Location: [facility];  Service: Endoscopy;  Laterality: N/A;    KIDNEY TRANSPLANT  2015    RETINAL LASER PROCEDURE         Time Tracking:     OT Date of Treatment: 03/04/25  OT Start Time: 1000  OT Stop Time: 1020  OT Total Time (min): 20 min    Billable Minutes: Evaluation 10 MINUTES and Therapeutic Activity 10 MINUTES    3/4/2025

## 2025-03-04 NOTE — PROGRESS NOTES
[facility] Medicine  Progress Note    Patient Name: Mitch Whittaker  MRN: [id number]  Patient Class: IP- Inpatient   Admission Date: 2/26/2025  Length of Stay: 6 days  Attending Physician: Lindsey Ferreira MD  Primary Care Provider: Valery Caal MD    Subjective     Principal Problem:ANGELITO (acute kidney injury)    HPI:  Patient is a 69-year-old  male with a PMH of CHF, kidney transplant, CAD, DM, DVT who presents to the ED with complaints of weakness.  Patient is a resident at [facility].  He states he was sent here for renal failure.  Patient does report still producing urine however it is decreased.  Does complain of some dysuria.  Reports that oral intake is good.  Denies any fever or chills.  No other issues reported at this time.    In the ED, H&H 8.0/27, K:  2.9, BUN/CR 60/4.3.  ED discussed with Dr. Gonsalez who recommended fluid challenge due to concern with over-diuresis.      Overview/Hospital Course:  02/27/2025  Will start empiric Rocephin for UTI.  Creatinine stable.  Continue IVF.  Nephrology on case.  Patient may need renal biopsy.  02/28/2025  Creatinine trending down.  Will continue IVF.  Gross hematuria improving.  Continue Rocephin for UTI.  3/1 creatinine continues to improve. Discontinue intravenous fluids per nephrology. Discontinue gan per nephrology, monitor for urinary retention. Discussed blood transfusion with nephrology and patient also agreeable. No transfusion reaction in the past.  3/2 urology consulted. hematuria improving. Denies abdominal pain. Complains of weakness and cough and leg swelling. Physical/occupational therapy consulted. Creatinine improving  3/3: creatinine continues to improve. Entresto and diuretics remains on hold. Episode of hematuria with clots overnight, but resolved this AM. Outpatient follow up with Urology for cystoscopy. Continue Rocephin for UTI  3/4: Creatine has returned to baseline. He has completed Rocephin for UTI.  Intermittent episodes of hematuria that began last night. Patient is urinating with difficulty. Per urology still recommends outpatient follow up. Awaiting decision for SNF versus HH.     Interval History: intermittent episodes of hematuria no without urinary retention. Kidney function has returned to baseline. Extensive conversation held with spouse, Michelle, about hospitalization and expectations of SNF.     Review of Systems   Constitutional:  Positive for activity change and fatigue. Negative for appetite change and fever.   Respiratory:  Positive for cough.    Cardiovascular:  Positive for leg swelling.   Genitourinary:  Negative for difficulty urinating, dysuria and hematuria.   Musculoskeletal:  Positive for arthralgias.   Allergic/Immunologic: Positive for immunocompromised state.   Neurological:  Positive for weakness.   Psychiatric/Behavioral:  Negative for agitation, behavioral problems, confusion, decreased concentration and dysphoric mood.      Objective:     Vital Signs (Most Recent):  Temp: 97.7 °F (36.5 °C) (03/04/25 1205)  Pulse: 85 (03/04/25 1205)  Resp: 20 (03/04/25 1205)  BP: (!) 100/46 (03/04/25 1205)  SpO2: 99 % (03/04/25 1205) Vital Signs (24h Range):  Temp:  [97.7 °F (36.5 °C)-99.1 °F (37.3 °C)] 97.7 °F (36.5 °C)  Pulse:  [] 85  Resp:  [18-21] 20  SpO2:  [96 %-99 %] 99 %  BP: ()/(46-82) 100/46     Weight: 104.6 kg (230 lb 9.6 oz)  Body mass index is 31.28 kg/m².  No intake or output data in the 24 hours ending 03/04/25 1404      Physical Exam  Vitals and nursing note reviewed.   Constitutional:       General: He is not in acute distress.     Appearance: He is ill-appearing. He is not toxic-appearing.   HENT:      Head: Normocephalic and atraumatic.   Cardiovascular:      Rate and Rhythm: Normal rate.   Pulmonary:      Effort: Pulmonary effort is normal. No respiratory distress.   Abdominal:      Palpations: Abdomen is soft.      Tenderness: There is no abdominal tenderness.    Musculoskeletal:      Right lower leg: Edema present.      Left lower leg: Edema present.   Skin:     General: Skin is warm.   Neurological:      Mental Status: He is alert and oriented to person, place, and time.      Motor: Weakness present.           Significant Labs: All pertinent labs within the past 24 hours have been reviewed.  CBC:   Recent Labs   Lab 03/02/25  1507 03/03/25  0650 03/04/25  0529   WBC  --  2.81* 2.66*   HGB 8.8* 7.6* 7.4*   HCT 30.3* 25.7* 26.0*   PLT  --  212 217     CMP:   Recent Labs   Lab 03/03/25  1049 03/04/25  0529    141   K 3.3* 3.4*    111*   CO2 24 23   * 233*   BUN 32* 30*   CREATININE 2.0* 1.8*   CALCIUM 8.9 8.1*   ALBUMIN  --  1.6*   ANIONGAP 9 7*       Significant Imaging: I have reviewed all pertinent imaging results/findings within the past 24 hours.    Assessment and Plan     * ANGELITO (acute kidney injury)  ANGELITO is likely due to pre-renal azotemia due to dehydration. Baseline creatinine is  1.4 . Most recent creatinine and eGFR are listed below.  Recent Labs     03/02/25  0555 03/03/25  1049 03/04/25  0529   CREATININE 2.2* 2.0* 1.8*   EGFRNORACEVR 31* 35* 40*        Plan  Hold diuretics   Monitor urine output   Urine sodium and creatinine   Consult Nephrology on case   Continue IVF  Patient may need renal biopsy    3/2/25  Creatinine improving   Discontinue intravenous fluids per nephrology   Nephrology on case    3/3/25  Continues to improve.   Hold Entresto and diuretics    3/4/25  improving    Anemia, chronic disease  Anemia is likely due to acute blood loss which was from hematuria and chronic disease due to Chronic Kidney Disease. Most recent hemoglobin and hematocrit are listed below.  Recent Labs     03/02/25  1507 03/03/25  0650 03/04/25  0529   HGB 8.8* 7.6* 7.4*   HCT 30.3* 25.7* 26.0*       Plan  - Monitor serial CBC: Daily  - Transfuse PRBC if patient becomes hemodynamically unstable, symptomatic or H/H drops below 7/21.  - Patient has received  "1 units of PRBCs on 3/1/25  - Patient's anemia is currently stable  - cbc in am    UTI (urinary tract infection)  Continue Rocephin.   Completed  3//3/25        Gross hematuria  Possibly related to Gan insertion   Will continue to monitor   should hematuria persists will plan to consult Urology on case  Hematuria improving  Discontinue gan  Agreeable to blood transfusion  Urology note reviewed  Continue intravenous antibiotic(s)   Will need outpatient urology for cystoscopy     3/4/25  Intermittent episodes of hematuria.   No indication for further workup IP if no urinary retention per Urology.   Will follow up outpatient      Chronic cough  Follows pulmonology outpatient  Former smoker  Complains of dry cough but no dyspnea  Schedule breathing treatments and monitor response      Deep vein thrombosis (DVT) of lower extremity  Due to hematuria   Will hold anticoagulation for now  Hb/hct in am       Chronic combined systolic and diastolic congestive heart failure  Patient has Combined Systolic and Diastolic heart failure that is Chronic. On presentation their CHF was well compensated. Most recent BNP and echo results are listed below.  No results for input(s): "BNP" in the last 72 hours.    Latest ECHO  Results for orders placed during the hospital encounter of 11/26/24    Echo    Interpretation Summary    Left Ventricle: The left ventricle is normal in size. Normal wall thickness. There is concentric hypertrophy. Normal wall motion. Septal motion is consistent with bundle branch block. There is moderately reduced systolic function. Ejection fraction is approximately 35%. Grade I diastolic dysfunction.    Right Ventricle: Normal right ventricular cavity size. Wall thickness is normal. Systolic function is normal.    IVC/SVC: Normal venous pressure at 3 mmHg.    Current Heart Failure Medications  metoprolol succinate (TOPROL-XL) 24 hr tablet 25 mg, Daily, Oral  hydrALAZINE injection 10 mg, Every 6 hours PRN, " Intravenous    Plan  - Monitor strict I&Os and daily weights.    - Place on telemetry  - Low sodium diet  - Place on fluid restriction of 1.5 L.   - Cardiology has not been consulted  - The patient's volume status is at their baseline  - patient appears euvolemic    One time bumex after blood transfusion      Type II diabetes mellitus with renal manifestations  Patient's FSGs are controlled on current medication regimen.  Last A1c reviewed-   Lab Results   Component Value Date    HGBA1C 5.7 (H) 12/17/2024     Most recent fingerstick glucose reviewed-   Recent Labs   Lab 03/03/25  1719 03/03/25  2307 03/04/25  0641 03/04/25  1136   POCTGLUCOSE 246* 280* 212* 166*       Current correctional scale  Medium  Maintain anti-hyperglycemic dose as follows-   Antihyperglycemics (From admission, onward)      Start     Stop Route Frequency Ordered    03/04/25 0900  insulin glargine U-100 (Lantus) pen 20 Units         -- SubQ 2 times daily 03/04/25 0811    02/26/25 1551  insulin aspart U-100 pen 0-10 Units         -- SubQ Before meals & nightly PRN 02/26/25 1451          Hold Oral hypoglycemics while patient is in the hospital.  Continue adjusting insulin needs as indicated     3/4/25   Glucose above goal  Optimize long acting 20 units BID    Chronic immunosuppression with Prograf, MMF and prednisone  Will hold CellCept for now   Continue Prograf   Prograf levels 3.4  Continue prednisone      Class 3 severe obesity due to excess calories with body mass index (BMI) of 40.0 to 44.9 in adult  Body mass index is 36.09 kg/m². Morbid obesity complicates all aspects of disease management from diagnostic modalities to treatment. Weight loss encouraged and health benefits explained to patient.   Physical/occupational therapy         VTE Risk Mitigation (From admission, onward)           Ordered     Place sequential compression device  Until discontinued         02/26/25 1607                    Discharge Planning   GRETEL:      Code Status:  Full Code   Medical Readiness for Discharge Date:   Discharge Plan A: Skilled Nursing Facility        Please place Justification for DME    Darya Maravilla NP  Department of Hospital Medicine   [facility] - Med Surg

## 2025-03-04 NOTE — PLAN OF CARE
Problem: Adult Inpatient Plan of Care  Goal: Plan of Care Review  Outcome: Progressing  Goal: Patient-Specific Goal (Individualized)  Outcome: Progressing  Goal: Absence of Hospital-Acquired Illness or Injury  Outcome: Progressing  Goal: Optimal Comfort and Wellbeing  Outcome: Progressing  Goal: Readiness for Transition of Care  Outcome: Progressing     Problem: Bariatric Environmental Safety  Goal: Safety Maintained with Care  Outcome: Progressing     Problem: Diabetes Comorbidity  Goal: Blood Glucose Level Within Targeted Range  Outcome: Progressing     Problem: Acute Kidney Injury/Impairment  Goal: Fluid and Electrolyte Balance  Outcome: Progressing  Goal: Improved Oral Intake  Outcome: Progressing  Goal: Effective Renal Function  Outcome: Progressing     Problem: Skin Injury Risk Increased  Goal: Skin Health and Integrity  Outcome: Progressing     Problem: Infection  Goal: Absence of Infection Signs and Symptoms  Outcome: Progressing     Problem: Fall Injury Risk  Goal: Absence of Fall and Fall-Related Injury  Outcome: Progressing

## 2025-03-05 LAB
ABO + RH BLD: NORMAL
ANION GAP SERPL CALC-SCNC: 9 MMOL/L (ref 8–16)
ANISOCYTOSIS BLD QL SMEAR: SLIGHT
BASOPHILS NFR BLD: 1 % (ref 0–1.9)
BLD GP AB SCN CELLS X3 SERPL QL: NORMAL
BLD PROD TYP BPU: NORMAL
BLOOD UNIT EXPIRATION DATE: NORMAL
BLOOD UNIT TYPE CODE: 7300
BLOOD UNIT TYPE: NORMAL
BUN SERPL-MCNC: 30 MG/DL (ref 8–23)
CALCIUM SERPL-MCNC: 8.5 MG/DL (ref 8.7–10.5)
CHLORIDE SERPL-SCNC: 112 MMOL/L (ref 95–110)
CO2 SERPL-SCNC: 23 MMOL/L (ref 23–29)
CODING SYSTEM: NORMAL
CREAT SERPL-MCNC: 1.7 MG/DL (ref 0.5–1.4)
CROSSMATCH INTERPRETATION: NORMAL
DIFFERENTIAL METHOD BLD: ABNORMAL
DISPENSE STATUS: NORMAL
EOSINOPHIL NFR BLD: 3 % (ref 0–8)
ERYTHROCYTE [DISTWIDTH] IN BLOOD BY AUTOMATED COUNT: 15.3 % (ref 11.5–14.5)
EST. GFR  (NO RACE VARIABLE): 43 ML/MIN/1.73 M^2
GIANT PLATELETS BLD QL SMEAR: PRESENT
GLUCOSE SERPL-MCNC: 139 MG/DL (ref 70–110)
HCT VFR BLD AUTO: 28.1 % (ref 40–54)
HGB BLD-MCNC: 7.8 G/DL (ref 14–18)
IMM GRANULOCYTES # BLD AUTO: ABNORMAL K/UL (ref 0–0.04)
IMM GRANULOCYTES NFR BLD AUTO: ABNORMAL % (ref 0–0.5)
LYMPHOCYTES NFR BLD: 44 % (ref 18–48)
MCH RBC QN AUTO: 23.7 PG (ref 27–31)
MCHC RBC AUTO-ENTMCNC: 27.8 G/DL (ref 32–36)
MCV RBC AUTO: 85 FL (ref 82–98)
MONOCYTES NFR BLD: 5 % (ref 4–15)
NEUTROPHILS NFR BLD: 47 % (ref 38–73)
NRBC BLD-RTO: 0 /100 WBC
NUM UNITS TRANS PACKED RBC: NORMAL
OVALOCYTES BLD QL SMEAR: ABNORMAL
PLATELET # BLD AUTO: 255 K/UL (ref 150–450)
PLATELET BLD QL SMEAR: ABNORMAL
PMV BLD AUTO: 10.7 FL (ref 9.2–12.9)
POCT GLUCOSE: 129 MG/DL (ref 70–110)
POCT GLUCOSE: 150 MG/DL (ref 70–110)
POCT GLUCOSE: 159 MG/DL (ref 70–110)
POCT GLUCOSE: 302 MG/DL (ref 70–110)
POIKILOCYTOSIS BLD QL SMEAR: SLIGHT
POLYCHROMASIA BLD QL SMEAR: ABNORMAL
POTASSIUM SERPL-SCNC: 3.3 MMOL/L (ref 3.5–5.1)
RBC # BLD AUTO: 3.29 M/UL (ref 4.6–6.2)
SODIUM SERPL-SCNC: 144 MMOL/L (ref 136–145)
SPECIMEN OUTDATE: NORMAL
WBC # BLD AUTO: 2.94 K/UL (ref 3.9–12.7)

## 2025-03-05 PROCEDURE — 36415 COLL VENOUS BLD VENIPUNCTURE: CPT | Performed by: FAMILY MEDICINE

## 2025-03-05 PROCEDURE — 97530 THERAPEUTIC ACTIVITIES: CPT

## 2025-03-05 PROCEDURE — 85027 COMPLETE CBC AUTOMATED: CPT | Performed by: NURSE PRACTITIONER

## 2025-03-05 PROCEDURE — 85007 BL SMEAR W/DIFF WBC COUNT: CPT | Performed by: NURSE PRACTITIONER

## 2025-03-05 PROCEDURE — 21400001 HC TELEMETRY ROOM

## 2025-03-05 PROCEDURE — 86920 COMPATIBILITY TEST SPIN: CPT | Performed by: NURSE PRACTITIONER

## 2025-03-05 PROCEDURE — 86900 BLOOD TYPING SEROLOGIC ABO: CPT | Performed by: NURSE PRACTITIONER

## 2025-03-05 PROCEDURE — 80048 BASIC METABOLIC PNL TOTAL CA: CPT | Performed by: NURSE PRACTITIONER

## 2025-03-05 PROCEDURE — 36430 TRANSFUSION BLD/BLD COMPNT: CPT

## 2025-03-05 PROCEDURE — 25000003 PHARM REV CODE 250: Performed by: INTERNAL MEDICINE

## 2025-03-05 PROCEDURE — 25000003 PHARM REV CODE 250: Performed by: FAMILY MEDICINE

## 2025-03-05 PROCEDURE — 63600175 PHARM REV CODE 636 W HCPCS: Performed by: FAMILY MEDICINE

## 2025-03-05 PROCEDURE — 63600175 PHARM REV CODE 636 W HCPCS: Performed by: INTERNAL MEDICINE

## 2025-03-05 PROCEDURE — 80197 ASSAY OF TACROLIMUS: CPT | Performed by: FAMILY MEDICINE

## 2025-03-05 PROCEDURE — 97110 THERAPEUTIC EXERCISES: CPT

## 2025-03-05 PROCEDURE — 99232 SBSQ HOSP IP/OBS MODERATE 35: CPT | Mod: ,,, | Performed by: INTERNAL MEDICINE

## 2025-03-05 PROCEDURE — 36415 COLL VENOUS BLD VENIPUNCTURE: CPT | Performed by: NURSE PRACTITIONER

## 2025-03-05 PROCEDURE — P9016 RBC LEUKOCYTES REDUCED: HCPCS | Performed by: NURSE PRACTITIONER

## 2025-03-05 RX ORDER — BUMETANIDE 0.25 MG/ML
1 INJECTION, SOLUTION INTRAMUSCULAR; INTRAVENOUS ONCE AS NEEDED
Status: DISCONTINUED | OUTPATIENT
Start: 2025-03-05 | End: 2025-03-12 | Stop reason: HOSPADM

## 2025-03-05 RX ORDER — LANOLIN ALCOHOL/MO/W.PET/CERES
400 CREAM (GRAM) TOPICAL DAILY
Status: DISCONTINUED | OUTPATIENT
Start: 2025-03-05 | End: 2025-03-10

## 2025-03-05 RX ORDER — HYDROCODONE BITARTRATE AND ACETAMINOPHEN 500; 5 MG/1; MG/1
TABLET ORAL
Status: DISCONTINUED | OUTPATIENT
Start: 2025-03-05 | End: 2025-03-12 | Stop reason: HOSPADM

## 2025-03-05 RX ORDER — POTASSIUM CHLORIDE 20 MEQ/1
40 TABLET, EXTENDED RELEASE ORAL 2 TIMES DAILY
Status: DISCONTINUED | OUTPATIENT
Start: 2025-03-05 | End: 2025-03-10

## 2025-03-05 RX ADMIN — CIPROFLOXACIN HYDROCHLORIDE 1 DROP: 3 SOLUTION/ DROPS OPHTHALMIC at 05:03

## 2025-03-05 RX ADMIN — METOPROLOL SUCCINATE 25 MG: 25 TABLET, EXTENDED RELEASE ORAL at 09:03

## 2025-03-05 RX ADMIN — CIPROFLOXACIN HYDROCHLORIDE 1 DROP: 3 SOLUTION/ DROPS OPHTHALMIC at 01:03

## 2025-03-05 RX ADMIN — MUPIROCIN: 20 OINTMENT TOPICAL at 11:03

## 2025-03-05 RX ADMIN — PREDNISONE 5 MG: 5 TABLET ORAL at 09:03

## 2025-03-05 RX ADMIN — MYCOPHENOLATE MOFETIL 500 MG: 500 TABLET, FILM COATED ORAL at 11:03

## 2025-03-05 RX ADMIN — MYCOPHENOLATE MOFETIL 500 MG: 500 TABLET, FILM COATED ORAL at 08:03

## 2025-03-05 RX ADMIN — INSULIN ASPART 4 UNITS: 100 INJECTION, SOLUTION INTRAVENOUS; SUBCUTANEOUS at 11:03

## 2025-03-05 RX ADMIN — INSULIN ASPART 1 UNITS: 100 INJECTION, SOLUTION INTRAVENOUS; SUBCUTANEOUS at 05:03

## 2025-03-05 RX ADMIN — INSULIN GLARGINE 20 UNITS: 100 INJECTION, SOLUTION SUBCUTANEOUS at 11:03

## 2025-03-05 RX ADMIN — FERROUS SULFATE TAB 325 MG (65 MG ELEMENTAL FE) 1 EACH: 325 (65 FE) TAB at 09:03

## 2025-03-05 RX ADMIN — MAGNESIUM OXIDE TAB 400 MG (241.3 MG ELEMENTAL MG) 400 MG: 400 (241.3 MG) TAB at 11:03

## 2025-03-05 RX ADMIN — CALCITRIOL CAPSULES 0.25 MCG 0.25 MCG: 0.25 CAPSULE ORAL at 09:03

## 2025-03-05 RX ADMIN — TACROLIMUS 4 MG: 1 CAPSULE ORAL at 08:03

## 2025-03-05 RX ADMIN — POTASSIUM CHLORIDE 40 MEQ: 1500 TABLET, EXTENDED RELEASE ORAL at 11:03

## 2025-03-05 RX ADMIN — CIPROFLOXACIN HYDROCHLORIDE 1 DROP: 3 SOLUTION/ DROPS OPHTHALMIC at 11:03

## 2025-03-05 RX ADMIN — INSULIN GLARGINE 20 UNITS: 100 INJECTION, SOLUTION SUBCUTANEOUS at 09:03

## 2025-03-05 RX ADMIN — TACROLIMUS 3 MG: 1 CAPSULE ORAL at 05:03

## 2025-03-05 RX ADMIN — GUAIFENESIN AND DEXTROMETHORPHAN 5 ML: 100; 10 SYRUP ORAL at 11:03

## 2025-03-05 RX ADMIN — MUPIROCIN: 20 OINTMENT TOPICAL at 09:03

## 2025-03-05 NOTE — ASSESSMENT & PLAN NOTE
Anemia is likely due to acute blood loss which was from hematuria and chronic disease due to Chronic Kidney Disease. Most recent hemoglobin and hematocrit are listed below.  Recent Labs     03/03/25  0650 03/04/25  0529 03/05/25  0637   HGB 7.6* 7.4* 7.8*   HCT 25.7* 26.0* 28.1*     Plan  - Monitor serial CBC: Daily  - Transfuse PRBC if patient becomes hemodynamically unstable, symptomatic or H/H drops below 7/21.  - Patient has received 1 units of PRBCs on 3/1/25  - Patient's anemia is currently stable  - cbc in am

## 2025-03-05 NOTE — PLAN OF CARE
Discussed poc with pt, pt verbalized understanding    Purposeful rounding every 2hours    VS monitored as ordered    Cardiac monitoring in use, pt is NSR to ST tele monitor # [id number]    Blood glucose monitoring, PRN coverage given    Fall precautions in place, remains injury free    IV saline locked    Accurate I&Os    Abx given as prescribed    Bed locked at lowest position    Call light within reach    Chart check complete    Will cont with POC

## 2025-03-05 NOTE — ASSESSMENT & PLAN NOTE
Patient's FSGs are controlled on current medication regimen.  Last A1c reviewed-   Lab Results   Component Value Date    HGBA1C 5.7 (H) 12/17/2024     Most recent fingerstick glucose reviewed-   Recent Labs   Lab 03/04/25 2124 03/05/25  0523 03/05/25  0936 03/05/25  1124   POCTGLUCOSE 230* 159* 129* 150*     Current correctional scale  Medium  Maintain anti-hyperglycemic dose as follows-   Antihyperglycemics (From admission, onward)      Start     Stop Route Frequency Ordered    03/04/25 0900  insulin glargine U-100 (Lantus) pen 20 Units         -- SubQ 2 times daily 03/04/25 0811    02/26/25 1551  insulin aspart U-100 pen 0-10 Units         -- SubQ Before meals & nightly PRN 02/26/25 1451          Hold Oral hypoglycemics while patient is in the hospital.  Continue adjusting insulin needs as indicated     3/4/25   Glucose above goal  Optimize long acting 20 units BID

## 2025-03-05 NOTE — PT/OT/SLP PROGRESS
Occupational Therapy  Treatment    Mitch Whittaker   MRN: [id number]   Admitting Diagnosis: ANGELITO (acute kidney injury)    OT Date of Treatment: 03/05/25   OT Start Time: 1000  OT Stop Time: 1030  OT Total Time (min): 30 min    Billable Minutes:  Therapeutic Activity 15 minutes and Therapeutic Exercise 15 minutes    OT/ANNIA: OT     Number of ANNIA visits since last OT visit: 0    General Precautions: Standard, fall  Orthopedic Precautions: N/A  Braces: N/A  Respiratory Status: Room air         Subjective:  Communicated with nurse and epic chart review prior to session.    Pain/Comfort  Pain Rating 1: 8/10  Location - Orientation 1: generalized  Location 1: leg    Objective:  Patient found with: telemetry, peripheral IV     Functional Mobility:  Bed Mobility:   Max a with rolling r<l  Total a with seated forward scooting    Activities of Daily Living:   LE dressing total a doff pressure relief boots       Balance:   Static Sit: FAIR+: Able to take MINIMAL challenges from all directions  Dynamic Sit: FAIR+: Maintains balance through MINIMAL excursions of active trunk motion  Therapeutic Activities and Exercises:  Educated patient on importance of increased tolerance to upright position and direct impact on CV endurance and strength. Patient encouraged to sit up in EOB, for a minimum of 2 consecutive hours including for all meals..Pt educated on HEP. . Pt performed 1 set x 12 reps b ue rom exercise with weighted stick.  Encouraged patient to perform AROM TE to BUE throughout the day within all available planes of motion. Re enforced importance of utilizing call light to meet needs in room and not attempt to get up without staff assistance. Patient verbalized understanding and agreed to comply.           AM-PAC 6 CLICK ADL   How much help from another person does this patient currently need?   1 = Unable, Total/Dependent Assistance  2 = A lot, Maximum/Moderate Assistance  3 = A little, Minimum/Contact Guard/Supervision  4 =  "None, Modified [place]/Independent    Putting on and taking off regular lower body clothing? : 2  Bathing (including washing, rinsing, drying)?: 2  Toileting, which includes using toilet, bedpan, or urinal? : 2  Putting on and taking off regular upper body clothing?: 2  Taking care of personal grooming such as brushing teeth?: 3  Eating meals?: 4  Daily Activity Total Score: 15     AM-PAC Raw Score CMS "G-Code Modifier Level of Impairment Assistance   6 % Total / Unable   7 - 8 CM 80 - 100% Maximal Assist   9-13 CL 60 - 80% Moderate Assist   14 - 19 CK 40 - 60% Moderate Assist   20 - 22 CJ 20 - 40% Minimal Assist   23 CI 1-20% SBA / CGA   24 CH 0% Independent/ Mod I       Patient left sitting edge of bed with all lines intact, call button in reach, and bed alarm on    ASSESSMENT:  Mitch Whittaker is a 69 y.o. male with a medical diagnosis of ANGELITO (acute kidney injury) and presents with debility and generalized weakness.      Rehab potential is good.    Activity tolerance: Good    Discharge recommendations: Moderate Intensity Therapy   Barriers to discharge:      Equipment recommendations: none    GOALS:   Multidisciplinary Problems       Occupational Therapy Goals          Problem: Occupational Therapy    Goal Priority Disciplines Outcome Interventions   Occupational Therapy Goal     OT, PT/OT     Description: O.T. GOALS TO BE MET BY 3-18-25  PT WILL TOLERATE 1 SET X 15 REPS B UE ROM EXERCISE  MOD A WITH SUPINE<SIT TRANSFERS  MAX A WITH BSC TRANSFERS                       DME Justifications:  TBD on next level of care    Plan:  Patient to be seen 2 x/week to address the above listed problems via self-care/home management, therapeutic activities, therapeutic exercises  Plan of Care expires: 03/18/25  Plan of Care reviewed with: parent         03/05/2025  "

## 2025-03-05 NOTE — PROGRESS NOTES
Nephrology Progress Note     History of Present Illness     69 y.o. 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 Patient has a routine nephrology follow up visit today and part of his appointment he had lab work done that revealed acute on chronic renal failure, patient was advised to come to the emergency room for further evaluation. According to the family patient has been in the [facility] rehab facility for some lower extremity weakness. Admission labs revealed a BUN of 60 and a serum creatinine of 4.3 mg/dL. Patient denies any pain in his abdomen or tenderness over his allograft. He has a Ann catheter was placed in the ER that has bloody urine. Blood pressure was slightly low on presentation which improved in the ER after fluid bolus. We are consulted for acute on chronic renal failure     Interval History     Overnight/currently:  Chart reviewed.  Patient seen and examined.  Lying in bed at 30° in no distress.  Comfortable on room air.  Feels pretty good.  Tolerating p.o..  Voiding without difficulty.  I reviewed a urine specimen--it was normal and yellow color.  There was a small clot present.  He denied any difficulty voiding.     Health Status   Allergies:    is allergic to lisinopril, actos  [pioglitazone], and metformin.    Current medications:   Current Medications[1]     Physical Examination   VS/Measurements   BP (!) 117/59 (BP Location: Left arm, Patient Position: Lying)   Pulse 93   Temp 97.5 °F (36.4 °C) (Oral)   Resp 20   Ht 6' (1.829 m)   Wt 104.6 kg (230 lb 9.6 oz)   SpO2 99%   BMI 31.28 kg/m²      General:  Alert and No acute distress.         Nutritional status:  Obese.    Neck:  Supple, No lymphadenopathy.     Respiratory:  Lungs are clear to auscultation, Respirations are non-labored, Symmetrical chest wall expansion.    Cardiovascular:  Normal rate, Regular rhythm.  No rub  Gastrointestinal:  Soft, Non-tender, Normal bowel sounds.  Allograft nontender  Integumentary:  Warm, Dry.   Psychiatric:  Cooperative, Appropriate mood & affect.        Review / Management   Laboratory Results   Today's Lab Results :    Recent Results (from the past 24 hours)   POCT glucose    Collection Time: 03/04/25  5:32 PM   Result Value Ref Range    POCT Glucose 174 (H) 70 - 110 mg/dL   POCT glucose    Collection Time: 03/04/25  9:24 PM   Result Value Ref Range    POCT Glucose 230 (H) 70 - 110 mg/dL   POCT glucose    Collection Time: 03/05/25  5:23 AM   Result Value Ref Range    POCT Glucose 159 (H) 70 - 110 mg/dL   Basic Metabolic Panel    Collection Time: 03/05/25  6:36 AM   Result Value Ref Range    Sodium 144 136 - 145 mmol/L    Potassium 3.3 (L) 3.5 - 5.1 mmol/L    Chloride 112 (H) 95 - 110 mmol/L    CO2 23 23 - 29 mmol/L    Glucose 139 (H) 70 - 110 mg/dL    BUN 30 (H) 8 - 23 mg/dL    Creatinine 1.7 (H) 0.5 - 1.4 mg/dL    Calcium 8.5 (L) 8.7 - 10.5 mg/dL    Anion Gap 9 8 - 16 mmol/L    eGFR 43 (A) >60 mL/min/1.73 m^2   CBC Auto Differential    Collection Time: 03/05/25  6:37 AM   Result Value Ref Range    WBC 2.94 (L) 3.90 - 12.70 K/uL    RBC 3.29 (L) 4.60 - 6.20 M/uL    Hemoglobin 7.8 (L) 14.0 - 18.0 g/dL    Hematocrit 28.1 (L) 40.0 - 54.0 %    MCV 85 82 - 98 fL    MCH 23.7 (L) 27.0 - 31.0 pg    MCHC 27.8 (L) 32.0 - 36.0 g/dL    RDW 15.3 (H) 11.5 - 14.5 %    Platelets 255 150 - 450 K/uL    MPV 10.7 9.2 - 12.9 fL    Immature Granulocytes CANCELED 0.0 - 0.5 %    Immature Grans (Abs) CANCELED 0.00 - 0.04 K/uL    nRBC 0 0 /100 WBC    Gran % 47.0 38.0 - 73.0 %    Lymph % 44.0 18.0 - 48.0 %    Mono % 5.0 4.0 - 15.0 %    Eosinophil % 3.0 0.0 - 8.0 %    Basophil % 1.0 0.0 - 1.9 %    Platelet  Estimate Appears normal     Aniso Slight     Poik Slight     Poly Occasional     Ovalocytes Occasional     Large/Giant Platelets Present     Differential Method Manual    POCT glucose    Collection Time: 03/05/25  9:36 AM   Result Value Ref Range    POCT Glucose 129 (H) 70 - 110 mg/dL   POCT glucose    Collection Time: 03/05/25 11:24 AM   Result Value Ref Range    POCT Glucose 150 (H) 70 - 110 mg/dL   Prepare RBC 1 Unit    Collection Time: 03/05/25 12:09 PM   Result Value Ref Range    UNIT NUMBER [id number]     Product Code [id number]     DISPENSE STATUS CROSSMATCHED     CODING SYSTEM [id number]     Unit Blood Type Code 7300     Unit Blood Type B POS     Unit Expiration [id number]     CROSSMATCH INTERPRETATION Compatible    Type & Screen    Collection Time: 03/05/25 12:09 PM   Result Value Ref Range    Group & Rh B POS     Indirect Jimmy NEG     Specimen Outdate 03/08/2025 23:59         Impression and Plan   Diagnosis     ANGELITO on CKD 3 secondary to possible ATN from hypotension  - baseline serum creatinine about 1.5 mg/dL, admission creatinine 4.3.   -his creatinine is improving .I have advised him to stay adequately hydrated.  -FENa 2.0%  -Entresto currently on hold due to ANGELITO  --improving     Gross hematuria secondary to Ann trauma.  His Ann catheter discontinued.  He is making urine. Follow uop; check pvr if uop drops.  On abx;  following     S/p LRDKT in 2015, continue immunosuppression with Prograf 4/3, target Prograf level 4-7, prednisone 5 mg daily and CellCept 500 mg twice a day.  -his Prograf level was slightly low but in the acceptable range.  We will repeat his Prograf again.  His kidney ultrasound did not reveal any hydro.  His renal artery Doppler did not reveal any stenosis in the main artery.     Hypertension.  His blood pressure is acceptable.     Hypokalemia.  Mild.  Replete.     Hypomagnesemia.  Replete and then recheck in the morning     HFrEF.  Last echo reports LV ejection fraction about  35%.        Anemia.  Likely multifactorial.  Monitor hemoglobin.  PRBC transfusion when indicated.  On oral iron.     Leukopenia - follow; on mmf     --recheck Prograf level in the morning.  Monitor leukopenia.  Sniff disposition pending    ______________________________________________  Noel Coronado MD    This document was created using voice recognition software.  It is possible that there are errors which have persisted after original proofreading.  If there is a question regarding contents of this document please contact me for clarification.         [1]   Current Facility-Administered Medications:     0.9%  NaCl infusion (for blood administration), , Intravenous, Q24H PRN, Lindsey Ferreira MD    0.9%  NaCl infusion (for blood administration), , Intravenous, Q24H PRN, Darya Maravilla NP    acetaminophen tablet 650 mg, 650 mg, Oral, Q6H PRN, Long Payan MD    amitriptyline tablet 25 mg, 25 mg, Oral, Nightly PRN, Long Payan MD    bumetanide injection 1 mg, 1 mg, Intravenous, Once PRN, Darya Maravilla NP    calcitRIOL capsule 0.25 mcg, 0.25 mcg, Oral, Daily, Long Payan MD, 0.25 mcg at 03/05/25 0938    ciprofloxacin HCl 0.3 % ophthalmic solution 1 drop, 1 drop, Both Eyes, Q6H, Darya Maravilla NP, 1 drop at 03/05/25 1153    dextromethorphan-guaiFENesin  mg/5 ml liquid 5 mL, 5 mL, Oral, Q4H PRN, Long Payan MD, 5 mL at 03/02/25 2153    dextrose 50% injection 12.5 g, 12.5 g, Intravenous, PRNOra Loi, MD    dextrose 50% injection 25 g, 25 g, Intravenous, PRNOra Loi, MD    ferrous sulfate tablet 1 each, 1 tablet, Oral, Daily, Long Payan MD, 1 each at 03/05/25 0938    glucagon (human recombinant) injection 1 mg, 1 mg, Intramuscular, PRN, Long Payan MD    glucose chewable tablet 16 g, 16 g, Oral, PROra REAL Loi, MD    glucose chewable tablet 24 g, 24 g, Oral, PROra REAL Loi, MD    hydrALAZINE injection 10 mg, 10 mg, Intravenous, Q6H PROra REAL Loi, MD    HYDROcodone-acetaminophen 5-325 mg per tablet 1 tablet, 1  tablet, Oral, Q8H PRN, Lindsey Ferreira MD, 1 tablet at 03/04/25 0425    insulin aspart U-100 pen 0-10 Units, 0-10 Units, Subcutaneous, QID (AC + HS) PRN, Long Payan MD, 1 Units at 03/05/25 0525    insulin glargine U-100 (Lantus) pen 20 Units, 20 Units, Subcutaneous, BID, Darya Maravilla NP, 20 Units at 03/05/25 0944    magnesium oxide tablet 400 mg, 400 mg, Oral, Daily, Lindsey Ferreira MD, 400 mg at 03/05/25 1154    metoprolol succinate (TOPROL-XL) 24 hr tablet 25 mg, 25 mg, Oral, Daily, Long Payan MD, 25 mg at 03/05/25 0939    mupirocin 2 % ointment, , Nasal, BID, Lindsey Ferreira MD, Given at 03/05/25 0942    mycophenolate tablet 500 mg, 500 mg, Oral, BID, Berlin Pacheco MD, 500 mg at 03/05/25 0826    ondansetron disintegrating tablet 4 mg, 4 mg, Oral, Q6H PRN, Long Payan MD    potassium chloride SA CR tablet 40 mEq, 40 mEq, Oral, BID, Lindsey Ferreira MD, 40 mEq at 03/05/25 1153    predniSONE tablet 5 mg, 5 mg, Oral, Daily, Long Payan MD, 5 mg at 03/05/25 0941    tacrolimus capsule 4 mg, 4 mg, Oral, Daily AM, 4 mg at 03/05/25 0826 **AND** tacrolimus capsule 3 mg, 3 mg, Oral, Daily PM, Long Payan MD, 3 mg at 03/04/25 9081

## 2025-03-05 NOTE — ASSESSMENT & PLAN NOTE
Body mass index is 31.28 kg/m². Morbid obesity complicates all aspects of disease management from diagnostic modalities to treatment. Weight loss encouraged and health benefits explained to patient.   Physical/occupational therapy

## 2025-03-05 NOTE — PLAN OF CARE
Discussed poc with pt, pt verbalized understanding    Purposeful rounding every 2hours    VS wnl  Cardiac monitoring in use, pt is NSR, tele monitor # [id number]  Fall precautions in place, remains injury free  Pt denies c/o pain    Accurate I&Os  Abx given as prescribed  Bed locked at lowest position  Call light within reach    Chart check complete  Will cont with POC

## 2025-03-05 NOTE — ASSESSMENT & PLAN NOTE
Anemia is likely due to acute blood loss which was from hematuria and chronic disease due to Chronic Kidney Disease. Most recent hemoglobin and hematocrit are listed below.  Recent Labs     03/03/25  0650 03/04/25  0529 03/05/25  0637   HGB 7.6* 7.4* 7.8*   HCT 25.7* 26.0* 28.1*     Plan  - Monitor serial CBC: Daily  - Transfuse PRBC if patient becomes hemodynamically unstable, symptomatic or H/H drops below 7/21.  - Patient has received 1 units of PRBCs on 3/1/25  - Patient's anemia is currently stable  - cbc in am    3/5/25  No more episodes of hematuria. Will give additional unit of blood today to reach goal of 8g/dL

## 2025-03-05 NOTE — PROGRESS NOTES
[facility] Medicine  Progress Note    Patient Name: Mitch Whittaker  MRN: [id number]  Patient Class: IP- Inpatient   Admission Date: 2/26/2025  Length of Stay: 7 days  Attending Physician: Lindsey Ferreira MD  Primary Care Provider: Valery Caal MD    Subjective     Principal Problem:ANGELITO (acute kidney injury)    HPI:  Patient is a 69-year-old  male with a PMH of CHF, kidney transplant, CAD, DM, DVT who presents to the ED with complaints of weakness.  Patient is a resident at [facility].  He states he was sent here for renal failure.  Patient does report still producing urine however it is decreased.  Does complain of some dysuria.  Reports that oral intake is good.  Denies any fever or chills.  No other issues reported at this time.    In the ED, H&H 8.0/27, K:  2.9, BUN/CR 60/4.3.  ED discussed with Dr. Gonsalez who recommended fluid challenge due to concern with over-diuresis.        Overview/Hospital Course:  02/27/2025  Will start empiric Rocephin for UTI.  Creatinine stable.  Continue IVF.  Nephrology on case.  Patient may need renal biopsy.  02/28/2025  Creatinine trending down.  Will continue IVF.  Gross hematuria improving.  Continue Rocephin for UTI.  3/1 creatinine continues to improve. Discontinue intravenous fluids per nephrology. Discontinue gan per nephrology, monitor for urinary retention. Discussed blood transfusion with nephrology and patient also agreeable. No transfusion reaction in the past.  3/2 urology consulted. hematuria improving. Denies abdominal pain. Complains of weakness and cough and leg swelling. Physical/occupational therapy consulted. Creatinine improving  3/3: creatinine continues to improve. Entresto and diuretics remains on hold. Episode of hematuria with clots overnight, but resolved this AM. Outpatient follow up with Urology for cystoscopy. Continue Rocephin for UTI  3/4: Creatine has returned to baseline. He has completed Rocephin for UTI.  Intermittent episodes of hematuria that began last night. Patient is urinating with difficulty. Per urology still recommends outpatient follow up. Awaiting decision for SNF versus HH.   3/5/25: patient and family are agreeable to SNF. Urine dark yellow today. Hemoglobin still below goal of 8g/dL. Will transfuse additional unit today and give Bumex post transfusion.     Interval History: sitting on side of bed after therapy. No hematuria today. H/H trending up. HE is agreeable to unit of blood to keep Hgb above 8. Awaiting SNF.    Review of Systems   Constitutional:  Positive for activity change and fatigue. Negative for appetite change and fever.   Respiratory:  Positive for cough.    Cardiovascular:  Positive for leg swelling.   Genitourinary:  Negative for difficulty urinating, dysuria and hematuria.   Musculoskeletal:  Positive for arthralgias.   Allergic/Immunologic: Positive for immunocompromised state.   Neurological:  Positive for weakness.   Psychiatric/Behavioral:  Negative for agitation, behavioral problems, confusion, decreased concentration and dysphoric mood.      Objective:     Vital Signs (Most Recent):  Temp: 98 °F (36.7 °C) (03/05/25 0730)  Pulse: 94 (03/05/25 0930)  Resp: 20 (03/05/25 0730)  BP: (!) 109/58 (03/05/25 0930)  SpO2: 98 % (03/05/25 0730) Vital Signs (24h Range):  Temp:  [97.7 °F (36.5 °C)-98.5 °F (36.9 °C)] 98 °F (36.7 °C)  Pulse:  [] 94  Resp:  [18-20] 20  SpO2:  [94 %-99 %] 98 %  BP: (100-158)/(46-70) 109/58     Weight: 104.6 kg (230 lb 9.6 oz)  Body mass index is 31.28 kg/m².    Intake/Output Summary (Last 24 hours) at 3/5/2025 1137  Last data filed at 3/5/2025 0516  Gross per 24 hour   Intake --   Output 175 ml   Net -175 ml      Assessment & Plan  ANGELITO (acute kidney injury)  ANGELITO is likely due to pre-renal azotemia due to dehydration. Baseline creatinine is  1.4 . Most recent creatinine and eGFR are listed below.  Recent Labs     03/03/25  1049 03/04/25  0529 03/05/25  0636    CREATININE 2.0* 1.8* 1.7*   EGFRNORACEVR 35* 40* 43*      Plan  Hold diuretics   Monitor urine output   Urine sodium and creatinine   Consult Nephrology on case   Continue IVF  Patient may need renal biopsy    3/2/25  Creatinine improving   Discontinue intravenous fluids per nephrology   Nephrology on case    3/3/25  Continues to improve.   Hold Entresto and diuretics    3/4/25  improving  Class 3 severe obesity due to excess calories with body mass index (BMI) of 40.0 to 44.9 in adult  Body mass index is 31.28 kg/m². Morbid obesity complicates all aspects of disease management from diagnostic modalities to treatment. Weight loss encouraged and health benefits explained to patient.   Physical/occupational therapy     Chronic immunosuppression with Prograf, MMF and prednisone  Will hold CellCept for now Continue Prograf   Prograf levels 3.4  Continue prednisone    Type II diabetes mellitus with renal manifestations  Patient's FSGs are controlled on current medication regimen.  Last A1c reviewed-   Lab Results   Component Value Date    HGBA1C 5.7 (H) 12/17/2024     Most recent fingerstick glucose reviewed-   Recent Labs   Lab 03/04/25  2124 03/05/25  0523 03/05/25  0936 03/05/25  1124   POCTGLUCOSE 230* 159* 129* 150*     Current correctional scale  Medium  Maintain anti-hyperglycemic dose as follows-   Antihyperglycemics (From admission, onward)      Start     Stop Route Frequency Ordered    03/04/25 0900  insulin glargine U-100 (Lantus) pen 20 Units         -- SubQ 2 times daily 03/04/25 0811    02/26/25 1551  insulin aspart U-100 pen 0-10 Units         -- SubQ Before meals & nightly PRN 02/26/25 1451          Hold Oral hypoglycemics while patient is in the hospital.  Continue adjusting insulin needs as indicated     3/4/25   Glucose above goal  Optimize long acting 20 units BID  Chronic combined systolic and diastolic congestive heart failure  Patient has Combined Systolic and Diastolic heart failure that is  "Chronic. On presentation their CHF was well compensated. Most recent BNP and echo results are listed below.  No results for input(s): "BNP" in the last 72 hours.    Latest ECHO  Results for orders placed during the hospital encounter of 11/26/24    Echo    Interpretation Summary    Left Ventricle: The left ventricle is normal in size. Normal wall thickness. There is concentric hypertrophy. Normal wall motion. Septal motion is consistent with bundle branch block. There is moderately reduced systolic function. Ejection fraction is approximately 35%. Grade I diastolic dysfunction.    Right Ventricle: Normal right ventricular cavity size. Wall thickness is normal. Systolic function is normal.    IVC/SVC: Normal venous pressure at 3 mmHg.    Current Heart Failure Medications  metoprolol succinate (TOPROL-XL) 24 hr tablet 25 mg, Daily, Oral  hydrALAZINE injection 10 mg, Every 6 hours PRN, Intravenous  bumetanide injection 1 mg, Once as needed, Intravenous    Plan  - Monitor strict I&Os and daily weights.    - Place on telemetry  - Low sodium diet  - Place on fluid restriction of 1.5 L.   - Cardiology has not been consulted  - The patient's volume status is at their baseline  - patient appears euvolemic    One time bumex after blood transfusion    Deep vein thrombosis (DVT) of lower extremity  Due to hematuria   Will hold anticoagulation for now  Hb/hct in am     Chronic cough  Follows pulmonology outpatient  Former smoker  Complains of dry cough but no dyspnea  Schedule breathing treatments and monitor response    Gross hematuria  Possibly related to Gan insertion   Will continue to monitor   should hematuria persists will plan to consult Urology on case  Hematuria improving  Discontinue gan  Agreeable to blood transfusion  Urology note reviewed  Continue intravenous antibiotic(s)   Will need outpatient urology for cystoscopy     3/4/25  Intermittent episodes of hematuria.   No indication for further workup IP if no " urinary retention per Urology.   Will follow up outpatient    UTI (urinary tract infection)  Continue Rocephin.   Completed  3//3/25      Anemia, chronic disease  Anemia is likely due to acute blood loss which was from hematuria and chronic disease due to Chronic Kidney Disease. Most recent hemoglobin and hematocrit are listed below.  Recent Labs     03/03/25  0650 03/04/25  0529 03/05/25  0637   HGB 7.6* 7.4* 7.8*   HCT 25.7* 26.0* 28.1*     Plan  - Monitor serial CBC: Daily  - Transfuse PRBC if patient becomes hemodynamically unstable, symptomatic or H/H drops below 7/21.  - Patient has received 1 units of PRBCs on 3/1/25  - Patient's anemia is currently stable  - cbc in am     Physical Exam  Vitals and nursing note reviewed.   Constitutional:       General: He is not in acute distress.     Appearance: He is ill-appearing. He is not toxic-appearing.   HENT:      Head: Normocephalic and atraumatic.   Cardiovascular:      Rate and Rhythm: Normal rate.   Pulmonary:      Effort: Pulmonary effort is normal. No respiratory distress.   Abdominal:      Palpations: Abdomen is soft.      Tenderness: There is no abdominal tenderness.   Musculoskeletal:      Right lower leg: Edema present.      Left lower leg: Edema present.   Skin:     General: Skin is warm.   Neurological:      Mental Status: He is alert and oriented to person, place, and time.      Motor: Weakness present.           Significant Labs: All pertinent labs within the past 24 hours have been reviewed.  CBC:   Recent Labs   Lab 03/04/25  0529 03/05/25  0637   WBC 2.66* 2.94*   HGB 7.4* 7.8*   HCT 26.0* 28.1*    255     CMP:   Recent Labs   Lab 03/04/25  0529 03/05/25  0636    144   K 3.4* 3.3*   * 112*   CO2 23 23   * 139*   BUN 30* 30*   CREATININE 1.8* 1.7*   CALCIUM 8.1* 8.5*   ALBUMIN 1.6*  --    ANIONGAP 7* 9       Significant Imaging: I have reviewed all pertinent imaging results/findings within the past 24 hours.    Assessment  and Plan     Assessment & Plan  ANGELITO (acute kidney injury)  ANGELITO is likely due to pre-renal azotemia due to dehydration. Baseline creatinine is  1.4 . Most recent creatinine and eGFR are listed below.  Recent Labs     03/03/25  1049 03/04/25  0529 03/05/25  0636   CREATININE 2.0* 1.8* 1.7*   EGFRNORACEVR 35* 40* 43*      Plan  Hold diuretics   Monitor urine output   Urine sodium and creatinine   Consult Nephrology on case   Continue IVF  Patient may need renal biopsy    3/2/25  Creatinine improving   Discontinue intravenous fluids per nephrology   Nephrology on case    3/3/25  Continues to improve.   Hold Entresto and diuretics    3/4/25  improving  Class 3 severe obesity due to excess calories with body mass index (BMI) of 40.0 to 44.9 in adult  Body mass index is 31.28 kg/m². Morbid obesity complicates all aspects of disease management from diagnostic modalities to treatment. Weight loss encouraged and health benefits explained to patient.   Physical/occupational therapy     Chronic immunosuppression with Prograf, MMF and prednisone  Will hold CellCept for now Continue Prograf   Prograf levels 3.4  Continue prednisone    Type II diabetes mellitus with renal manifestations  Patient's FSGs are controlled on current medication regimen.  Last A1c reviewed-   Lab Results   Component Value Date    HGBA1C 5.7 (H) 12/17/2024     Most recent fingerstick glucose reviewed-   Recent Labs   Lab 03/04/25  2124 03/05/25  0523 03/05/25  0936 03/05/25  1124   POCTGLUCOSE 230* 159* 129* 150*     Current correctional scale  Medium  Maintain anti-hyperglycemic dose as follows-   Antihyperglycemics (From admission, onward)      Start     Stop Route Frequency Ordered    03/04/25 0900  insulin glargine U-100 (Lantus) pen 20 Units         -- SubQ 2 times daily 03/04/25 0811    02/26/25 1551  insulin aspart U-100 pen 0-10 Units         -- SubQ Before meals & nightly PRN 02/26/25 1451          Hold Oral hypoglycemics while patient is in the  "hospital.  Continue adjusting insulin needs as indicated     3/4/25   Glucose above goal  Optimize long acting 20 units BID    3/5/25  Glucose better controlled today  Chronic combined systolic and diastolic congestive heart failure  Patient has Combined Systolic and Diastolic heart failure that is Chronic. On presentation their CHF was well compensated. Most recent BNP and echo results are listed below.  No results for input(s): "BNP" in the last 72 hours.    Latest ECHO  Results for orders placed during the hospital encounter of 11/26/24    Echo    Interpretation Summary    Left Ventricle: The left ventricle is normal in size. Normal wall thickness. There is concentric hypertrophy. Normal wall motion. Septal motion is consistent with bundle branch block. There is moderately reduced systolic function. Ejection fraction is approximately 35%. Grade I diastolic dysfunction.    Right Ventricle: Normal right ventricular cavity size. Wall thickness is normal. Systolic function is normal.    IVC/SVC: Normal venous pressure at 3 mmHg.    Current Heart Failure Medications  metoprolol succinate (TOPROL-XL) 24 hr tablet 25 mg, Daily, Oral  hydrALAZINE injection 10 mg, Every 6 hours PRN, Intravenous  bumetanide injection 1 mg, Once as needed, Intravenous    Plan  - Monitor strict I&Os and daily weights.    - Place on telemetry  - Low sodium diet  - Place on fluid restriction of 1.5 L.   - Cardiology has not been consulted  - The patient's volume status is at their baseline  - patient appears euvolemic    One time bumex after blood transfusion    Deep vein thrombosis (DVT) of lower extremity  Due to hematuria   Will hold anticoagulation for now  Hb/hct in am       3/5/25  No further episodes of hematuria. Will likely restart anticoagulation after Urology visit  Chronic cough  Follows pulmonology outpatient  Former smoker  Complains of dry cough but no dyspnea  Schedule breathing treatments and monitor response    Gross " hematuria  Possibly related to Gan insertion   Will continue to monitor   should hematuria persists will plan to consult Urology on case  Hematuria improving  Discontinue gan  Agreeable to blood transfusion  Urology note reviewed  Continue intravenous antibiotic(s)   Will need outpatient urology for cystoscopy     3/4/25  Intermittent episodes of hematuria.   No indication for further workup IP if no urinary retention per Urology.   Will follow up outpatient    3/5/25  No episodes of hematuria today  Will plan for Urology OP visit once discharged    UTI (urinary tract infection)  Continue Rocephin.   Completed  3//3/25      Anemia, chronic disease  Anemia is likely due to acute blood loss which was from hematuria and chronic disease due to Chronic Kidney Disease. Most recent hemoglobin and hematocrit are listed below.  Recent Labs     03/03/25  0650 03/04/25  0529 03/05/25  0637   HGB 7.6* 7.4* 7.8*   HCT 25.7* 26.0* 28.1*     Plan  - Monitor serial CBC: Daily  - Transfuse PRBC if patient becomes hemodynamically unstable, symptomatic or H/H drops below 7/21.  - Patient has received 1 units of PRBCs on 3/1/25  - Patient's anemia is currently stable  - cbc in am    3/5/25  No more episodes of hematuria. Will give additional unit of blood today to reach goal of 8g/dL  VTE Risk Mitigation (From admission, onward)           Ordered     Place sequential compression device  Until discontinued         02/26/25 1607                    Discharge Planning   GRETEL:      Code Status: Full Code   Medical Readiness for Discharge Date:   Discharge Plan A: Skilled Nursing Facility          Darya Maravilla NP  Department of Hospital Medicine   [facility] - Med Surg

## 2025-03-05 NOTE — PLAN OF CARE
HECTOR spoke with Mahogany at [facility] who stated pt's wife will bring financials due to concerns about pt not being able to go home after snf.

## 2025-03-05 NOTE — ASSESSMENT & PLAN NOTE
Possibly related to Gan insertion   Will continue to monitor   should hematuria persists will plan to consult Urology on case  Hematuria improving  Discontinue gan  Agreeable to blood transfusion  Urology note reviewed  Continue intravenous antibiotic(s)   Will need outpatient urology for cystoscopy     3/4/25  Intermittent episodes of hematuria.   No indication for further workup IP if no urinary retention per Urology.   Will follow up outpatient    3/5/25  No episodes of hematuria today  Will plan for Urology OP visit once discharged

## 2025-03-05 NOTE — ASSESSMENT & PLAN NOTE
ANEGLITO is likely due to pre-renal azotemia due to dehydration. Baseline creatinine is 1.4. Most recent creatinine and eGFR are listed below.  Recent Labs     03/03/25  1049 03/04/25  0529 03/05/25  0636   CREATININE 2.0* 1.8* 1.7*   EGFRNORACEVR 35* 40* 43*      Plan  Hold diuretics   Monitor urine output   Urine sodium and creatinine   Consult Nephrology on case   Continue IVF  Patient may need renal biopsy    3/2/25  Creatinine improving   Discontinue intravenous fluids per nephrology   Nephrology on case    3/3/25  Continues to improve.   Hold Entresto and diuretics    3/4/25  improving

## 2025-03-05 NOTE — ASSESSMENT & PLAN NOTE
ANGELITO is likely due to pre-renal azotemia due to dehydration. Baseline creatinine is 1.4. Most recent creatinine and eGFR are listed below.  Recent Labs     03/03/25  1049 03/04/25  0529 03/05/25  0636   CREATININE 2.0* 1.8* 1.7*   EGFRNORACEVR 35* 40* 43*      Plan  Hold diuretics   Monitor urine output   Urine sodium and creatinine   Consult Nephrology on case   Continue IVF  Patient may need renal biopsy    3/2/25  Creatinine improving   Discontinue intravenous fluids per nephrology   Nephrology on case    3/3/25  Continues to improve.   Hold Entresto and diuretics    3/4/25  improving

## 2025-03-05 NOTE — SUBJECTIVE & OBJECTIVE
Interval History: sitting on side of bed after therapy. No hematuria today. H/H trending up. HE is agreeable to unit of blood to keep Hgb above 8. Awaiting SNF.    Review of Systems   Constitutional:  Positive for activity change and fatigue. Negative for appetite change and fever.   Respiratory:  Positive for cough.    Cardiovascular:  Positive for leg swelling.   Genitourinary:  Negative for difficulty urinating, dysuria and hematuria.   Musculoskeletal:  Positive for arthralgias.   Allergic/Immunologic: Positive for immunocompromised state.   Neurological:  Positive for weakness.   Psychiatric/Behavioral:  Negative for agitation, behavioral problems, confusion, decreased concentration and dysphoric mood.      Objective:     Vital Signs (Most Recent):  Temp: 98 °F (36.7 °C) (03/05/25 0730)  Pulse: 94 (03/05/25 0930)  Resp: 20 (03/05/25 0730)  BP: (!) 109/58 (03/05/25 0930)  SpO2: 98 % (03/05/25 0730) Vital Signs (24h Range):  Temp:  [97.7 °F (36.5 °C)-98.5 °F (36.9 °C)] 98 °F (36.7 °C)  Pulse:  [] 94  Resp:  [18-20] 20  SpO2:  [94 %-99 %] 98 %  BP: (100-158)/(46-70) 109/58     Weight: 104.6 kg (230 lb 9.6 oz)  Body mass index is 31.28 kg/m².    Intake/Output Summary (Last 24 hours) at 3/5/2025 1137  Last data filed at 3/5/2025 0516  Gross per 24 hour   Intake --   Output 175 ml   Net -175 ml      Assessment & Plan  ANGELITO (acute kidney injury)  ANGELITO is likely due to pre-renal azotemia due to dehydration. Baseline creatinine is  1.4 . Most recent creatinine and eGFR are listed below.  Recent Labs     03/03/25  1049 03/04/25  0529 03/05/25  0636   CREATININE 2.0* 1.8* 1.7*   EGFRNORACEVR 35* 40* 43*      Plan  Hold diuretics   Monitor urine output   Urine sodium and creatinine   Consult Nephrology on case   Continue IVF  Patient may need renal biopsy    3/2/25  Creatinine improving   Discontinue intravenous fluids per nephrology   Nephrology on case    3/3/25  Continues to improve.   Hold Entresto and  "diuretics    3/4/25  improving  Class 3 severe obesity due to excess calories with body mass index (BMI) of 40.0 to 44.9 in adult  Body mass index is 31.28 kg/m². Morbid obesity complicates all aspects of disease management from diagnostic modalities to treatment. Weight loss encouraged and health benefits explained to patient.   Physical/occupational therapy     Chronic immunosuppression with Prograf, MMF and prednisone  Will hold CellCept for now Continue Prograf   Prograf levels 3.4  Continue prednisone    Type II diabetes mellitus with renal manifestations  Patient's FSGs are controlled on current medication regimen.  Last A1c reviewed-   Lab Results   Component Value Date    HGBA1C 5.7 (H) 12/17/2024     Most recent fingerstick glucose reviewed-   Recent Labs   Lab 03/04/25  2124 03/05/25  0523 03/05/25  0936 03/05/25  1124   POCTGLUCOSE 230* 159* 129* 150*     Current correctional scale  Medium  Maintain anti-hyperglycemic dose as follows-   Antihyperglycemics (From admission, onward)      Start     Stop Route Frequency Ordered    03/04/25 0900  insulin glargine U-100 (Lantus) pen 20 Units         -- SubQ 2 times daily 03/04/25 0811    02/26/25 1551  insulin aspart U-100 pen 0-10 Units         -- SubQ Before meals & nightly PRN 02/26/25 1451          Hold Oral hypoglycemics while patient is in the hospital.  Continue adjusting insulin needs as indicated     3/4/25   Glucose above goal  Optimize long acting 20 units BID  Chronic combined systolic and diastolic congestive heart failure  Patient has Combined Systolic and Diastolic heart failure that is Chronic. On presentation their CHF was well compensated. Most recent BNP and echo results are listed below.  No results for input(s): "BNP" in the last 72 hours.    Latest ECHO  Results for orders placed during the hospital encounter of 11/26/24    Echo    Interpretation Summary    Left Ventricle: The left ventricle is normal in size. Normal wall thickness. There " is concentric hypertrophy. Normal wall motion. Septal motion is consistent with bundle branch block. There is moderately reduced systolic function. Ejection fraction is approximately 35%. Grade I diastolic dysfunction.    Right Ventricle: Normal right ventricular cavity size. Wall thickness is normal. Systolic function is normal.    IVC/SVC: Normal venous pressure at 3 mmHg.    Current Heart Failure Medications  metoprolol succinate (TOPROL-XL) 24 hr tablet 25 mg, Daily, Oral  hydrALAZINE injection 10 mg, Every 6 hours PRN, Intravenous  bumetanide injection 1 mg, Once as needed, Intravenous    Plan  - Monitor strict I&Os and daily weights.    - Place on telemetry  - Low sodium diet  - Place on fluid restriction of 1.5 L.   - Cardiology has not been consulted  - The patient's volume status is at their baseline  - patient appears euvolemic    One time bumex after blood transfusion    Deep vein thrombosis (DVT) of lower extremity  Due to hematuria   Will hold anticoagulation for now  Hb/hct in am     Chronic cough  Follows pulmonology outpatient  Former smoker  Complains of dry cough but no dyspnea  Schedule breathing treatments and monitor response    Gross hematuria  Possibly related to Gan insertion   Will continue to monitor   should hematuria persists will plan to consult Urology on case  Hematuria improving  Discontinue gan  Agreeable to blood transfusion  Urology note reviewed  Continue intravenous antibiotic(s)   Will need outpatient urology for cystoscopy     3/4/25  Intermittent episodes of hematuria.   No indication for further workup IP if no urinary retention per Urology.   Will follow up outpatient    UTI (urinary tract infection)  Continue Rocephin.   Completed  3//3/25      Anemia, chronic disease  Anemia is likely due to acute blood loss which was from hematuria and chronic disease due to Chronic Kidney Disease. Most recent hemoglobin and hematocrit are listed below.  Recent Labs     03/03/25  0650  03/04/25  0529 03/05/25  0637   HGB 7.6* 7.4* 7.8*   HCT 25.7* 26.0* 28.1*     Plan  - Monitor serial CBC: Daily  - Transfuse PRBC if patient becomes hemodynamically unstable, symptomatic or H/H drops below 7/21.  - Patient has received 1 units of PRBCs on 3/1/25  - Patient's anemia is currently stable  - cbc in am     Physical Exam  Vitals and nursing note reviewed.   Constitutional:       General: He is not in acute distress.     Appearance: He is ill-appearing. He is not toxic-appearing.   HENT:      Head: Normocephalic and atraumatic.   Cardiovascular:      Rate and Rhythm: Normal rate.   Pulmonary:      Effort: Pulmonary effort is normal. No respiratory distress.   Abdominal:      Palpations: Abdomen is soft.      Tenderness: There is no abdominal tenderness.   Musculoskeletal:      Right lower leg: Edema present.      Left lower leg: Edema present.   Skin:     General: Skin is warm.   Neurological:      Mental Status: He is alert and oriented to person, place, and time.      Motor: Weakness present.           Significant Labs: All pertinent labs within the past 24 hours have been reviewed.  CBC:   Recent Labs   Lab 03/04/25  0529 03/05/25  0637   WBC 2.66* 2.94*   HGB 7.4* 7.8*   HCT 26.0* 28.1*    255     CMP:   Recent Labs   Lab 03/04/25  0529 03/05/25  0636    144   K 3.4* 3.3*   * 112*   CO2 23 23   * 139*   BUN 30* 30*   CREATININE 1.8* 1.7*   CALCIUM 8.1* 8.5*   ALBUMIN 1.6*  --    ANIONGAP 7* 9       Significant Imaging: I have reviewed all pertinent imaging results/findings within the past 24 hours.

## 2025-03-05 NOTE — PT/OT/SLP PROGRESS
"Physical Therapy  Treatment    Mitch Whittaker   MRN: [id number]   Admitting Diagnosis: ANGELITO (acute kidney injury)    PT Received On: 03/05/25  PT Start Time: 1000     PT Stop Time: 1025    PT Total Time (min): 25 min       Billable Minutes:  Therapeutic Activity 15 and Therapeutic Exercise 10    Treatment Type: Treatment  PT/PTA: PT     Number of PTA visits since last PT visit: 0       General Precautions: Standard, fall  Orthopedic Precautions: N/A  Braces: N/A  Respiratory Status: Room air         Subjective:  Communicated with NURSE AND EPIC CHART REVIEW  prior to session.   PT AGREED TO TX     Pain/Comfort  Pain Rating 1: 8/10  Location - Side 1: Bilateral  Location 1: leg  Pain Addressed 1: Reposition, Cessation of Activity    Objective:   Patient found with: telemetry, peripheral IV    Functional Mobility:  PT MET IN RM SUP IN BED. PT COMPLETED AAROM OF AP, HS AND HIP ADD/ABD AND LIMITED ROM . PT COMPLETED LONG SIT X 2 IN BED WITH MOD A AND TRANSITIONED TO SITTING EOB WITH MAX A AND INC TIME. PT SCOOT IN BED WITH MAX A. PT PROPPED WITH PILLOWS FOR SITTING TOLERANCE. PT EDUCATED TO SIT AS LONG AS TOLERABLE AND SHIFTING SIDE TO SIDE TO UNWT. HIPS     Treatment and Education:  PT EDUCATED ON "CALL DON'T FALL", ENCOURAGED TO CALL FOR ASSISTANCE WITH ALL NEEDS FOR OOB MOBILITY.       AM-PAC 6 CLICK MOBILITY  How much help from another person does this patient currently need?   1 = Unable, Total/Dependent Assistance  2 = A lot, Maximum/Moderate Assistance  3 = A little, Minimum/Contact Guard/Supervision  4 = None, Modified [place]/Independent    Turning over in bed (including adjusting bedclothes, sheets and blankets)?: 2  Sitting down on and standing up from a chair with arms (e.g., wheelchair, bedside commode, etc.): 1  Moving from lying on back to sitting on the side of the bed?: 2  Moving to and from a bed to a chair (including a wheelchair)?: 1  Need to walk in hospital room?: 1  Climbing 3-5 steps with a " railing?: 1  Basic Mobility Total Score: 8    AM-PAC Raw Score CMS G-Code Modifier Level of Impairment Assistance   6 % Total / Unable   7 - 9 CM 80 - 100% Maximal Assist   10 - 14 CL 60 - 80% Moderate Assist   15 - 19 CK 40 - 60% Moderate Assist   20 - 22 CJ 20 - 40% Minimal Assist   23 CI 1-20% SBA / CGA   24 CH 0% Independent/ Mod I     Patient left sitting edge of bed with call button in reach and bed alarm on.    Assessment:  PT PROGRESSING WITH SITTING EOB     Rehab identified problem list/impairments: weakness, impaired endurance, impaired self care skills, impaired functional mobility, gait instability, impaired balance, pain, edema, decreased coordination, decreased upper extremity function, decreased lower extremity function, impaired coordination, decreased ROM, decreased safety awareness, impaired skin    Rehab potential is fair.    Activity tolerance: Fair    Discharge recommendations: Moderate Intensity Therapy      Barriers to discharge:      Equipment recommendations: none     GOALS:   Multidisciplinary Problems       Physical Therapy Goals          Problem: Physical Therapy    Goal Priority Disciplines Outcome Interventions   Physical Therapy Goal     PT, PT/OT Progressing    Description: LTG: 3/16/25  1. PT WILL LOG ROLL IN BED WITH MIN A  2. PT WILL SIT EOB WITH MOD A FOR SITTING TOLERANCE  3. PT WILL COMPLETE B LE TE X 10 REPS TO INC ROM AND STRENGTHENING  4. PT WILL INC AMPAC SCORE BY 2 POINTS TO PROGRESS GROSS FUNC MOBILITY.                          DME Justifications:  No DME recommended requiring DME justifications    PLAN:    Patient to be seen 3 x/week to address the above listed problems via therapeutic activities, therapeutic exercises  Plan of Care expires: 03/16/25  Plan of Care reviewed with: patient         03/05/2025

## 2025-03-05 NOTE — ASSESSMENT & PLAN NOTE
Due to hematuria   Will hold anticoagulation for now  Hb/hct in am       3/5/25  No further episodes of hematuria. Will likely restart anticoagulation after Urology visit

## 2025-03-05 NOTE — ASSESSMENT & PLAN NOTE
"Patient has Combined Systolic and Diastolic heart failure that is Chronic. On presentation their CHF was well compensated. Most recent BNP and echo results are listed below.  No results for input(s): "BNP" in the last 72 hours.    Latest ECHO  Results for orders placed during the hospital encounter of 11/26/24    Echo    Interpretation Summary    Left Ventricle: The left ventricle is normal in size. Normal wall thickness. There is concentric hypertrophy. Normal wall motion. Septal motion is consistent with bundle branch block. There is moderately reduced systolic function. Ejection fraction is approximately 35%. Grade I diastolic dysfunction.    Right Ventricle: Normal right ventricular cavity size. Wall thickness is normal. Systolic function is normal.    IVC/SVC: Normal venous pressure at 3 mmHg.    Current Heart Failure Medications  metoprolol succinate (TOPROL-XL) 24 hr tablet 25 mg, Daily, Oral  hydrALAZINE injection 10 mg, Every 6 hours PRN, Intravenous  bumetanide injection 1 mg, Once as needed, Intravenous    Plan  - Monitor strict I&Os and daily weights.    - Place on telemetry  - Low sodium diet  - Place on fluid restriction of 1.5 L.   - Cardiology has not been consulted  - The patient's volume status is at their baseline  - patient appears euvolemic    One time bumex after blood transfusion    "

## 2025-03-05 NOTE — PLAN OF CARE
Discussed poc with pt, pt verbalized understanding    Purposeful rounding every 2hours    VS wnl  Cardiac monitoring in use, pt is NSR/TACHY, tele monitor #[id number]  Blood glucose monitoring   Fall precautions in place, remains injury free  Pt denies c/o pain    1 unit of RBC infused  IVFs  Accurate I&Os  Bed locked at lowest position  Call light within reach    Chart check complete  Will cont with POC

## 2025-03-05 NOTE — ASSESSMENT & PLAN NOTE
Patient's FSGs are controlled on current medication regimen.  Last A1c reviewed-   Lab Results   Component Value Date    HGBA1C 5.7 (H) 12/17/2024     Most recent fingerstick glucose reviewed-   Recent Labs   Lab 03/04/25 2124 03/05/25  0523 03/05/25  0936 03/05/25  1124   POCTGLUCOSE 230* 159* 129* 150*     Current correctional scale  Medium  Maintain anti-hyperglycemic dose as follows-   Antihyperglycemics (From admission, onward)      Start     Stop Route Frequency Ordered    03/04/25 0900  insulin glargine U-100 (Lantus) pen 20 Units         -- SubQ 2 times daily 03/04/25 0811    02/26/25 1551  insulin aspart U-100 pen 0-10 Units         -- SubQ Before meals & nightly PRN 02/26/25 1451          Hold Oral hypoglycemics while patient is in the hospital.  Continue adjusting insulin needs as indicated     3/4/25   Glucose above goal  Optimize long acting 20 units BID    3/5/25  Glucose better controlled today

## 2025-03-06 LAB
ANION GAP SERPL CALC-SCNC: 9 MMOL/L (ref 8–16)
ANISOCYTOSIS BLD QL SMEAR: SLIGHT
BASOPHILS # BLD AUTO: ABNORMAL K/UL (ref 0–0.2)
BASOPHILS NFR BLD: 2 % (ref 0–1.9)
BUN SERPL-MCNC: 29 MG/DL (ref 8–23)
CALCIUM SERPL-MCNC: 8.6 MG/DL (ref 8.7–10.5)
CHLORIDE SERPL-SCNC: 114 MMOL/L (ref 95–110)
CO2 SERPL-SCNC: 23 MMOL/L (ref 23–29)
CREAT SERPL-MCNC: 1.5 MG/DL (ref 0.5–1.4)
DIFFERENTIAL METHOD BLD: ABNORMAL
EOSINOPHIL # BLD AUTO: ABNORMAL K/UL (ref 0–0.5)
EOSINOPHIL NFR BLD: 6 % (ref 0–8)
ERYTHROCYTE [DISTWIDTH] IN BLOOD BY AUTOMATED COUNT: 15.1 % (ref 11.5–14.5)
EST. GFR  (NO RACE VARIABLE): 49 ML/MIN/1.73 M^2
GLUCOSE SERPL-MCNC: 105 MG/DL (ref 70–110)
HCT VFR BLD AUTO: 31.3 % (ref 40–54)
HGB BLD-MCNC: 8.9 G/DL (ref 14–18)
IMM GRANULOCYTES # BLD AUTO: ABNORMAL K/UL (ref 0–0.04)
IMM GRANULOCYTES NFR BLD AUTO: ABNORMAL % (ref 0–0.5)
LYMPHOCYTES # BLD AUTO: ABNORMAL K/UL (ref 1–4.8)
LYMPHOCYTES NFR BLD: 24 % (ref 18–48)
MAGNESIUM SERPL-MCNC: 1.6 MG/DL (ref 1.6–2.6)
MCH RBC QN AUTO: 24.5 PG (ref 27–31)
MCHC RBC AUTO-ENTMCNC: 28.4 G/DL (ref 32–36)
MCV RBC AUTO: 86 FL (ref 82–98)
MONOCYTES # BLD AUTO: ABNORMAL K/UL (ref 0.3–1)
MONOCYTES NFR BLD: 14 % (ref 4–15)
NEUTROPHILS NFR BLD: 54 % (ref 38–73)
NRBC BLD-RTO: 0 /100 WBC
OVALOCYTES BLD QL SMEAR: ABNORMAL
PLATELET # BLD AUTO: 239 K/UL (ref 150–450)
PLATELET BLD QL SMEAR: ABNORMAL
PMV BLD AUTO: 10.9 FL (ref 9.2–12.9)
POCT GLUCOSE: 111 MG/DL (ref 70–110)
POCT GLUCOSE: 79 MG/DL (ref 70–110)
POCT GLUCOSE: 88 MG/DL (ref 70–110)
POCT GLUCOSE: 92 MG/DL (ref 70–110)
POIKILOCYTOSIS BLD QL SMEAR: SLIGHT
POLYCHROMASIA BLD QL SMEAR: ABNORMAL
POTASSIUM SERPL-SCNC: 4 MMOL/L (ref 3.5–5.1)
RBC # BLD AUTO: 3.63 M/UL (ref 4.6–6.2)
SODIUM SERPL-SCNC: 146 MMOL/L (ref 136–145)
TACROLIMUS BLD-MCNC: 2.9 NG/ML (ref 5–15)
WBC # BLD AUTO: 2.92 K/UL (ref 3.9–12.7)

## 2025-03-06 PROCEDURE — 80197 ASSAY OF TACROLIMUS: CPT | Performed by: INTERNAL MEDICINE

## 2025-03-06 PROCEDURE — 63600175 PHARM REV CODE 636 W HCPCS: Performed by: INTERNAL MEDICINE

## 2025-03-06 PROCEDURE — 80048 BASIC METABOLIC PNL TOTAL CA: CPT | Performed by: NURSE PRACTITIONER

## 2025-03-06 PROCEDURE — 21400001 HC TELEMETRY ROOM

## 2025-03-06 PROCEDURE — 25000003 PHARM REV CODE 250: Performed by: INTERNAL MEDICINE

## 2025-03-06 PROCEDURE — 36415 COLL VENOUS BLD VENIPUNCTURE: CPT | Performed by: NURSE PRACTITIONER

## 2025-03-06 PROCEDURE — 85007 BL SMEAR W/DIFF WBC COUNT: CPT | Performed by: NURSE PRACTITIONER

## 2025-03-06 PROCEDURE — 97530 THERAPEUTIC ACTIVITIES: CPT

## 2025-03-06 PROCEDURE — 99232 SBSQ HOSP IP/OBS MODERATE 35: CPT | Mod: ,,, | Performed by: INTERNAL MEDICINE

## 2025-03-06 PROCEDURE — 25000003 PHARM REV CODE 250: Performed by: FAMILY MEDICINE

## 2025-03-06 PROCEDURE — 97110 THERAPEUTIC EXERCISES: CPT

## 2025-03-06 PROCEDURE — 63600175 PHARM REV CODE 636 W HCPCS: Performed by: FAMILY MEDICINE

## 2025-03-06 PROCEDURE — 85027 COMPLETE CBC AUTOMATED: CPT | Performed by: NURSE PRACTITIONER

## 2025-03-06 PROCEDURE — 83735 ASSAY OF MAGNESIUM: CPT | Performed by: INTERNAL MEDICINE

## 2025-03-06 RX ORDER — TACROLIMUS 1 MG/1
4 CAPSULE ORAL EVERY MORNING
Status: DISCONTINUED | OUTPATIENT
Start: 2025-03-07 | End: 2025-03-10

## 2025-03-06 RX ORDER — TACROLIMUS 1 MG/1
4 CAPSULE ORAL EVERY EVENING
Status: DISCONTINUED | OUTPATIENT
Start: 2025-03-06 | End: 2025-03-10

## 2025-03-06 RX ADMIN — TACROLIMUS 4 MG: 1 CAPSULE ORAL at 05:03

## 2025-03-06 RX ADMIN — GUAIFENESIN AND DEXTROMETHORPHAN 5 ML: 100; 10 SYRUP ORAL at 06:03

## 2025-03-06 RX ADMIN — CALCITRIOL CAPSULES 0.25 MCG 0.25 MCG: 0.25 CAPSULE ORAL at 09:03

## 2025-03-06 RX ADMIN — MYCOPHENOLATE MOFETIL 500 MG: 500 TABLET, FILM COATED ORAL at 09:03

## 2025-03-06 RX ADMIN — POTASSIUM CHLORIDE 40 MEQ: 1500 TABLET, EXTENDED RELEASE ORAL at 09:03

## 2025-03-06 RX ADMIN — CIPROFLOXACIN HYDROCHLORIDE 1 DROP: 3 SOLUTION/ DROPS OPHTHALMIC at 12:03

## 2025-03-06 RX ADMIN — CIPROFLOXACIN HYDROCHLORIDE 1 DROP: 3 SOLUTION/ DROPS OPHTHALMIC at 05:03

## 2025-03-06 RX ADMIN — FERROUS SULFATE TAB 325 MG (65 MG ELEMENTAL FE) 1 EACH: 325 (65 FE) TAB at 09:03

## 2025-03-06 RX ADMIN — MYCOPHENOLATE MOFETIL 500 MG: 500 TABLET, FILM COATED ORAL at 08:03

## 2025-03-06 RX ADMIN — TACROLIMUS 4 MG: 1 CAPSULE ORAL at 09:03

## 2025-03-06 RX ADMIN — PREDNISONE 5 MG: 5 TABLET ORAL at 09:03

## 2025-03-06 RX ADMIN — MAGNESIUM OXIDE TAB 400 MG (241.3 MG ELEMENTAL MG) 400 MG: 400 (241.3 MG) TAB at 09:03

## 2025-03-06 RX ADMIN — INSULIN GLARGINE 20 UNITS: 100 INJECTION, SOLUTION SUBCUTANEOUS at 09:03

## 2025-03-06 RX ADMIN — METOPROLOL SUCCINATE 25 MG: 25 TABLET, EXTENDED RELEASE ORAL at 09:03

## 2025-03-06 RX ADMIN — MUPIROCIN: 20 OINTMENT TOPICAL at 09:03

## 2025-03-06 RX ADMIN — CIPROFLOXACIN HYDROCHLORIDE 1 DROP: 3 SOLUTION/ DROPS OPHTHALMIC at 06:03

## 2025-03-06 RX ADMIN — POTASSIUM CHLORIDE 40 MEQ: 1500 TABLET, EXTENDED RELEASE ORAL at 08:03

## 2025-03-06 RX ADMIN — GUAIFENESIN AND DEXTROMETHORPHAN 5 ML: 100; 10 SYRUP ORAL at 08:03

## 2025-03-06 NOTE — PLAN OF CARE
ESCOBAR spoke with Lelia at [facility] who stated she is awaiting for pt's spouse to provide financials and the longterm medicaid application. Lelia with [facility] emailed escobar the medicaid application to give to pt's spouse. ESCOBAR provided pt's spouse and daughter with application.

## 2025-03-06 NOTE — PROGRESS NOTES
Nephrology Progress Note     History of Present Illness     69 y.o. 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 Patient has a routine nephrology follow up visit today and part of his appointment he had lab work done that revealed acute on chronic renal failure, patient was advised to come to the emergency room for further evaluation. According to the family patient has been in the [facility] rehab facility for some lower extremity weakness. Admission labs revealed a BUN of 60 and a serum creatinine of 4.3 mg/dL. Patient denies any pain in his abdomen or tenderness over his allograft. He has a Ann catheter was placed in the ER that has bloody urine. Blood pressure was slightly low on presentation which improved in the ER after fluid bolus. We are consulted for acute on chronic renal failure     Interval History     Overnight/currently:  Patient feels well and has no shortness chest pain or nausea or vomiting.  He denies passing any further clots.  No other acute issues this morning     Health Status   Allergies:    is allergic to lisinopril, actos  [pioglitazone], and metformin.    Current medications:   Current Medications[1]     Physical Examination   VS/Measurements   BP (!) 141/71 (BP Location: Right arm, Patient Position: Lying)   Pulse 93   Temp 97.9 °F (36.6 °C) (Oral)   Resp 18   Ht 6' (1.829 m)   Wt 104.6 kg (230 lb 9.6 oz)   SpO2 98%   BMI 31.28 kg/m²      General:  Alert and oriented X3, No acute distress.         Nutritional status: Obese.    Neck:  Supple, No lymphadenopathy.     Respiratory:  Lungs are clear to auscultation, Respirations are non-labored, Symmetrical chest  wall expansion.    Cardiovascular:  Normal rate, Regular rhythm.   Gastrointestinal:  Soft, Non-tender, Normal bowel sounds.   Integumentary:  Warm, Dry.   Psychiatric:  Cooperative, Appropriate mood & affect.        Review / Management   Laboratory Results   Today's Lab Results :    Recent Results (from the past 24 hours)   POCT glucose    Collection Time: 03/05/25 11:22 PM   Result Value Ref Range    POCT Glucose 302 (H) 70 - 110 mg/dL   CBC Auto Differential    Collection Time: 03/06/25  5:53 AM   Result Value Ref Range    WBC 2.92 (L) 3.90 - 12.70 K/uL    RBC 3.63 (L) 4.60 - 6.20 M/uL    Hemoglobin 8.9 (L) 14.0 - 18.0 g/dL    Hematocrit 31.3 (L) 40.0 - 54.0 %    MCV 86 82 - 98 fL    MCH 24.5 (L) 27.0 - 31.0 pg    MCHC 28.4 (L) 32.0 - 36.0 g/dL    RDW 15.1 (H) 11.5 - 14.5 %    Platelets 239 150 - 450 K/uL    MPV 10.9 9.2 - 12.9 fL    Immature Granulocytes CANCELED 0.0 - 0.5 %    Immature Grans (Abs) CANCELED 0.00 - 0.04 K/uL    Lymph # CANCELED 1.0 - 4.8 K/uL    Mono # CANCELED 0.3 - 1.0 K/uL    Eos # CANCELED 0.0 - 0.5 K/uL    Baso # CANCELED 0.00 - 0.20 K/uL    nRBC 0 0 /100 WBC    Gran % 54.0 38.0 - 73.0 %    Lymph % 24.0 18.0 - 48.0 %    Mono % 14.0 4.0 - 15.0 %    Eosinophil % 6.0 0.0 - 8.0 %    Basophil % 2.0 (H) 0.0 - 1.9 %    Platelet Estimate Appears normal     Aniso Slight     Poik Slight     Poly Occasional     Ovalocytes Occasional     Differential Method Manual    Basic Metabolic Panel    Collection Time: 03/06/25  5:53 AM   Result Value Ref Range    Sodium 146 (H) 136 - 145 mmol/L    Potassium 4.0 3.5 - 5.1 mmol/L    Chloride 114 (H) 95 - 110 mmol/L    CO2 23 23 - 29 mmol/L    Glucose 105 70 - 110 mg/dL    BUN 29 (H) 8 - 23 mg/dL    Creatinine 1.5 (H) 0.5 - 1.4 mg/dL    Calcium 8.6 (L) 8.7 - 10.5 mg/dL    Anion Gap 9 8 - 16 mmol/L    eGFR 49 (A) >60 mL/min/1.73 m^2   Magnesium    Collection Time: 03/06/25  5:53 AM   Result Value Ref Range    Magnesium 1.6 1.6 - 2.6 mg/dL   POCT glucose    Collection  Time: 03/06/25  6:04 AM   Result Value Ref Range    POCT Glucose 111 (H) 70 - 110 mg/dL   POCT glucose    Collection Time: 03/06/25 12:19 PM   Result Value Ref Range    POCT Glucose 88 70 - 110 mg/dL        Impression and Plan   Diagnosis     ANGELITO on CKD 3 secondary to possible ATN from hypotension  - baseline serum creatinine about 1.5 mg/dL, admission creatinine 4.3.   -his creatinine is at bL. I have advised him to stay adequately hydrated.  -FENa 2.0%  -Entresto currently on hold due to ANGELITO     Gross hematuria secondary to Ann trauma.  His Ann catheter discontinued.  He is making urine. Follow uop; check pvr if uop drops.  On abx;  following     S/p LRDKT in 2015, continue immunosuppression with Prograf 4/3, target Prograf level 4-7, prednisone 5 mg daily and CellCept 500 mg twice a day.  -his Prograf level was slightly low thus will increase evening dose to 4.  Prograf in a couple of days.  His kidney ultrasound did not reveal any hydro.  His renal artery Doppler did not reveal any stenosis in the main artery.     Hypertension.  His blood pressure is acceptable.     Hypokalemia.  Mild.  Replete as needed     Hypomagnesemia.  Repleted follow     HFrEF.  Last echo reports LV ejection fraction about 35%.        Anemia.  Likely multifactorial.  Monitor hemoglobin.  PRBC transfusion when indicated.  On oral iron.     Leukopenia - follow; on mmf       ______________________________________________  Gasper Magallanes      This document was created using voice recognition software.  It is possible that there are errors which have persisted after original proofreading.  If there is a question regarding contents of this document please contact me for clarification.       [1]   Current Facility-Administered Medications:     0.9%  NaCl infusion (for blood administration), , Intravenous, Q24H PRN, Lindsey Ferreira MD    0.9%  NaCl infusion (for blood administration), , Intravenous, Q24H PRN, Taiwo, Dreshellia, NP     acetaminophen tablet 650 mg, 650 mg, Oral, Q6H PRN, Long Payan MD    amitriptyline tablet 25 mg, 25 mg, Oral, Nightly PRN, Long Payan MD    bumetanide injection 1 mg, 1 mg, Intravenous, Once PRN, Darya Maravilla NP    calcitRIOL capsule 0.25 mcg, 0.25 mcg, Oral, Daily, Long Payan MD, 0.25 mcg at 03/06/25 0929    ciprofloxacin HCl 0.3 % ophthalmic solution 1 drop, 1 drop, Both Eyes, Q6H, Darya Maravilla NP, 1 drop at 03/06/25 1220    dextromethorphan-guaiFENesin  mg/5 ml liquid 5 mL, 5 mL, Oral, Q4H PRN, Long Payan MD, 5 mL at 03/06/25 0600    dextrose 50% injection 12.5 g, 12.5 g, Intravenous, PRNOra Loi, MD    dextrose 50% injection 25 g, 25 g, Intravenous, PRN, Long Payan MD    ferrous sulfate tablet 1 each, 1 tablet, Oral, Daily, Long Payan MD, 1 each at 03/06/25 0928    glucagon (human recombinant) injection 1 mg, 1 mg, Intramuscular, PRN, Long Payan MD    glucose chewable tablet 16 g, 16 g, Oral, PRN, Long Payan MD    glucose chewable tablet 24 g, 24 g, Oral, PRN, Long Payan MD    hydrALAZINE injection 10 mg, 10 mg, Intravenous, Q6H PRN, Long Payan MD    HYDROcodone-acetaminophen 5-325 mg per tablet 1 tablet, 1 tablet, Oral, Q8H PRN, Lindsey Ferreira MD, 1 tablet at 03/04/25 0425    insulin aspart U-100 pen 0-10 Units, 0-10 Units, Subcutaneous, QID (AC + HS) PRN, Long Payan MD, 4 Units at 03/05/25 2323    insulin glargine U-100 (Lantus) pen 20 Units, 20 Units, Subcutaneous, BID, Darya Maravilla NP, 20 Units at 03/06/25 0934    magnesium oxide tablet 400 mg, 400 mg, Oral, Daily, Lindsey Ferreira MD, 400 mg at 03/06/25 0927    metoprolol succinate (TOPROL-XL) 24 hr tablet 25 mg, 25 mg, Oral, Daily, Long Payan MD, 25 mg at 03/06/25 0929    mycophenolate tablet 500 mg, 500 mg, Oral, BID, Berlin Pacheco MD, 500 mg at 03/06/25 0927    ondansetron disintegrating tablet 4 mg, 4 mg, Oral, Q6H PRN, Long Payan MD    potassium chloride SA CR tablet 40 mEq, 40 mEq, Oral, BID, Noel Coronado MD, 40 mEq at 03/06/25  0928    predniSONE tablet 5 mg, 5 mg, Oral, Daily, Long Payan MD, 5 mg at 03/06/25 0929    tacrolimus capsule 4 mg, 4 mg, Oral, Daily AM, 4 mg at 03/06/25 0929 **AND** tacrolimus capsule 3 mg, 3 mg, Oral, Daily PM, Long Payan MD, 3 mg at 03/05/25 3092

## 2025-03-06 NOTE — PROGRESS NOTES
[facility] Medicine  Progress Note    Patient Name: Mitch Whittaker  MRN: [id number]  Patient Class: IP- Inpatient   Admission Date: 2/26/2025  Length of Stay: 8 days  Attending Physician: Lindsey Ferreira MD  Primary Care Provider: Valery Caal MD    Subjective     Principal Problem:ANGELITO (acute kidney injury)      HPI:  Patient is a 69-year-old  male with a PMH of CHF, kidney transplant, CAD, DM, DVT who presents to the ED with complaints of weakness.  Patient is a resident at [facility].  He states he was sent here for renal failure.  Patient does report still producing urine however it is decreased.  Does complain of some dysuria.  Reports that oral intake is good.  Denies any fever or chills.  No other issues reported at this time.    In the ED, H&H 8.0/27, K:  2.9, BUN/CR 60/4.3.  ED discussed with Dr. Gonsalez who recommended fluid challenge due to concern with over-diuresis.        Overview/Hospital Course:  02/27/2025  Will start empiric Rocephin for UTI.  Creatinine stable.  Continue IVF.  Nephrology on case.  Patient may need renal biopsy.  02/28/2025  Creatinine trending down.  Will continue IVF.  Gross hematuria improving.  Continue Rocephin for UTI.  3/1 creatinine continues to improve. Discontinue intravenous fluids per nephrology. Discontinue gan per nephrology, monitor for urinary retention. Discussed blood transfusion with nephrology and patient also agreeable. No transfusion reaction in the past.  3/2 urology consulted. hematuria improving. Denies abdominal pain. Complains of weakness and cough and leg swelling. Physical/occupational therapy consulted. Creatinine improving  3/3: creatinine continues to improve. Entresto and diuretics remains on hold. Episode of hematuria with clots overnight, but resolved this AM. Outpatient follow up with Urology for cystoscopy. Continue Rocephin for UTI  3/4: Creatine has returned to baseline. He has completed Rocephin for  UTI. Intermittent episodes of hematuria that began last night. Patient is urinating with difficulty. Per urology still recommends outpatient follow up. Awaiting decision for SNF versus HH.   3/5/25: patient and family are agreeable to SNF. Urine dark yellow today. Hemoglobin still below goal of 8g/dL. Will transfuse additional unit today and give Bumex post transfusion.   3/6/25: Awaiting placement at [facility] History: sitting upon side of bed. Family member assisting with bedbath. Waiting SNF placement.    Review of Systems   Constitutional:  Positive for activity change and fatigue. Negative for appetite change and fever.   Respiratory:  Positive for cough.    Cardiovascular:  Positive for leg swelling.   Genitourinary:  Negative for difficulty urinating, dysuria and hematuria.   Musculoskeletal:  Positive for arthralgias.   Allergic/Immunologic: Positive for immunocompromised state.   Neurological:  Positive for weakness.   Psychiatric/Behavioral:  Negative for agitation, behavioral problems, confusion, decreased concentration and dysphoric mood.      Objective:     Vital Signs (Most Recent):  Temp: 97.9 °F (36.6 °C) (03/06/25 1611)  Pulse: 93 (03/06/25 1611)  Resp: 18 (03/06/25 1611)  BP: (!) 141/71 (03/06/25 1611)  SpO2: 98 % (03/06/25 1611) Vital Signs (24h Range):  Temp:  [97.3 °F (36.3 °C)-98.4 °F (36.9 °C)] 97.9 °F (36.6 °C)  Pulse:  [71-96] 93  Resp:  [16-18] 18  SpO2:  [97 %-99 %] 98 %  BP: (108-156)/(59-71) 141/71     Weight: 104.6 kg (230 lb 9.6 oz)  Body mass index is 31.28 kg/m².    Intake/Output Summary (Last 24 hours) at 3/6/2025 1744  Last data filed at 3/5/2025 1851  Gross per 24 hour   Intake 733.33 ml   Output --   Net 733.33 ml         Physical Exam  Vitals and nursing note reviewed.   Constitutional:       General: He is not in acute distress.     Appearance: He is ill-appearing. He is not toxic-appearing.   HENT:      Head: Normocephalic and atraumatic.   Cardiovascular:       Rate and Rhythm: Normal rate.   Pulmonary:      Effort: Pulmonary effort is normal. No respiratory distress.   Abdominal:      Palpations: Abdomen is soft.      Tenderness: There is no abdominal tenderness.   Musculoskeletal:      Right lower leg: Edema present.      Left lower leg: Edema present.   Skin:     General: Skin is warm.   Neurological:      Mental Status: He is alert and oriented to person, place, and time.      Motor: Weakness present.           Significant Labs: All pertinent labs within the past 24 hours have been reviewed.  CBC:   Recent Labs   Lab 03/05/25  0637 03/06/25  0553   WBC 2.94* 2.92*   HGB 7.8* 8.9*   HCT 28.1* 31.3*    239     CMP:   Recent Labs   Lab 03/05/25  0636 03/06/25  0553    146*   K 3.3* 4.0   * 114*   CO2 23 23   * 105   BUN 30* 29*   CREATININE 1.7* 1.5*   CALCIUM 8.5* 8.6*   ANIONGAP 9 9       Significant Imaging: I have reviewed all pertinent imaging results/findings within the past 24 hours.      Assessment & Plan  ANGELITO (acute kidney injury)  ANGELITO is likely due to pre-renal azotemia due to dehydration. Baseline creatinine is  1.4 . Most recent creatinine and eGFR are listed below.  Recent Labs     03/04/25  0529 03/05/25  0636 03/06/25  0553   CREATININE 1.8* 1.7* 1.5*   EGFRNORACEVR 40* 43* 49*      Plan  Hold diuretics   Monitor urine output   Urine sodium and creatinine   Consult Nephrology on case   Continue IVF  Patient may need renal biopsy    3/2/25  Creatinine improving   Discontinue intravenous fluids per nephrology   Nephrology on case    3/3/25  Continues to improve.   Hold Entresto and diuretics    3/4/25  improving  Class 3 severe obesity due to excess calories with body mass index (BMI) of 40.0 to 44.9 in adult  Body mass index is 31.28 kg/m². Morbid obesity complicates all aspects of disease management from diagnostic modalities to treatment. Weight loss encouraged and health benefits explained to patient.   Physical/occupational  "therapy     Chronic immunosuppression with Prograf, MMF and prednisone  Will hold CellCept for now Continue Prograf   Prograf levels 3.4  Continue prednisone    Type II diabetes mellitus with renal manifestations  Patient's FSGs are controlled on current medication regimen.  Last A1c reviewed-   Lab Results   Component Value Date    HGBA1C 5.7 (H) 12/17/2024     Most recent fingerstick glucose reviewed-   Recent Labs   Lab 03/05/25  2322 03/06/25  0604 03/06/25  1219   POCTGLUCOSE 302* 111* 88     Current correctional scale  Medium  Maintain anti-hyperglycemic dose as follows-   Antihyperglycemics (From admission, onward)      Start     Stop Route Frequency Ordered    03/04/25 0900  insulin glargine U-100 (Lantus) pen 20 Units         -- SubQ 2 times daily 03/04/25 0811    02/26/25 1551  insulin aspart U-100 pen 0-10 Units         -- SubQ Before meals & nightly PRN 02/26/25 1451          Hold Oral hypoglycemics while patient is in the hospital.  Continue adjusting insulin needs as indicated     3/4/25   Glucose above goal  Optimize long acting 20 units BID    3/5/25  Glucose better controlled today  Chronic combined systolic and diastolic congestive heart failure  Patient has Combined Systolic and Diastolic heart failure that is Chronic. On presentation their CHF was well compensated. Most recent BNP and echo results are listed below.  No results for input(s): "BNP" in the last 72 hours.    Latest ECHO  Results for orders placed during the hospital encounter of 11/26/24    Echo    Interpretation Summary    Left Ventricle: The left ventricle is normal in size. Normal wall thickness. There is concentric hypertrophy. Normal wall motion. Septal motion is consistent with bundle branch block. There is moderately reduced systolic function. Ejection fraction is approximately 35%. Grade I diastolic dysfunction.    Right Ventricle: Normal right ventricular cavity size. Wall thickness is normal. Systolic function is normal.    " IVC/SVC: Normal venous pressure at 3 mmHg.    Current Heart Failure Medications  metoprolol succinate (TOPROL-XL) 24 hr tablet 25 mg, Daily, Oral  hydrALAZINE injection 10 mg, Every 6 hours PRN, Intravenous  bumetanide injection 1 mg, Once as needed, Intravenous    Plan  - Monitor strict I&Os and daily weights.    - Place on telemetry  - Low sodium diet  - Place on fluid restriction of 1.5 L.   - Cardiology has not been consulted  - The patient's volume status is at their baseline  - patient appears euvolemic    One time bumex after blood transfusion    3/6/25  Stable.    Deep vein thrombosis (DVT) of lower extremity  Due to hematuria   Will hold anticoagulation for now  Hb/hct in am       3/5/25  No further episodes of hematuria. Will likely restart anticoagulation after Urology visit  Chronic cough  Follows pulmonology outpatient  Former smoker  Complains of dry cough but no dyspnea  Schedule breathing treatments and monitor response    Gross hematuria  Possibly related to Gan insertion   Will continue to monitor   should hematuria persists will plan to consult Urology on case  Hematuria improving  Discontinue gan  Agreeable to blood transfusion  Urology note reviewed  Continue intravenous antibiotic(s)   Will need outpatient urology for cystoscopy     3/4/25  Intermittent episodes of hematuria.   No indication for further workup IP if no urinary retention per Urology.   Will follow up outpatient    3/5/25  No episodes of hematuria today  Will plan for Urology OP visit once discharged    UTI (urinary tract infection)  Continue Rocephin.   Completed  3//3/25      Anemia, chronic disease  Anemia is likely due to acute blood loss which was from hematuria and chronic disease due to Chronic Kidney Disease. Most recent hemoglobin and hematocrit are listed below.  Recent Labs     03/04/25  0529 03/05/25  0637 03/06/25  0553   HGB 7.4* 7.8* 8.9*   HCT 26.0* 28.1* 31.3*     Plan  - Monitor serial CBC: Daily  -  Transfuse PRBC if patient becomes hemodynamically unstable, symptomatic or H/H drops below 7/21.  - Patient has received 1 units of PRBCs on 3/1/25  - Patient's anemia is currently stable  - cbc in am    3/5/25  No more episodes of hematuria. Will give additional unit of blood today to reach goal of 8g/dL  VTE Risk Mitigation (From admission, onward)           Ordered     Place sequential compression device  Until discontinued         02/26/25 1607                    Discharge Planning   GRETEL:      Code Status: Full Code   Medical Readiness for Discharge Date:   Discharge Plan A: Skilled Nursing Facility            Darya Maravilla NP  Department of Hospital Medicine   [facility] - Med Surg

## 2025-03-06 NOTE — PLAN OF CARE
Discussed poc with pt, pt verbalized understanding    Purposeful rounding every 2hours    VS wnl  Blood glucose monitoring  Fall precautions in place, remains injury free  Pain and nausea under control with PRN meds    Accurate I&Os  Abx given as prescribed  Bed locked at lowest position  Call light within reach    Chart check complete  Will cont with POC

## 2025-03-06 NOTE — ASSESSMENT & PLAN NOTE
Patient's FSGs are controlled on current medication regimen.  Last A1c reviewed-   Lab Results   Component Value Date    HGBA1C 5.7 (H) 12/17/2024     Most recent fingerstick glucose reviewed-   Recent Labs   Lab 03/05/25  2322 03/06/25  0604 03/06/25  1219   POCTGLUCOSE 302* 111* 88     Current correctional scale  Medium  Maintain anti-hyperglycemic dose as follows-   Antihyperglycemics (From admission, onward)      Start     Stop Route Frequency Ordered    03/04/25 0900  insulin glargine U-100 (Lantus) pen 20 Units         -- SubQ 2 times daily 03/04/25 0811    02/26/25 1551  insulin aspart U-100 pen 0-10 Units         -- SubQ Before meals & nightly PRN 02/26/25 1451          Hold Oral hypoglycemics while patient is in the hospital.  Continue adjusting insulin needs as indicated     3/4/25   Glucose above goal  Optimize long acting 20 units BID    3/5/25  Glucose better controlled today

## 2025-03-06 NOTE — PLAN OF CARE
Discussed poc with pt, pt verbalized understanding  Purposeful rounding every 2hours  VS wnl  Cardiac monitoring in use, pt is NSR, tele monitor #[id number]  Blood glucose monitoring   Fall precautions in place, remains injury free  Pain and nausea under control with PRN meds  IVFs  Accurate I&Os    Bed locked at lowest position  Call light within reach  Chart check complete  Will cont with POC

## 2025-03-06 NOTE — ASSESSMENT & PLAN NOTE
ANGELITO is likely due to pre-renal azotemia due to dehydration. Baseline creatinine is 1.4. Most recent creatinine and eGFR are listed below.  Recent Labs     03/04/25  0529 03/05/25  0636 03/06/25  0553   CREATININE 1.8* 1.7* 1.5*   EGFRNORACEVR 40* 43* 49*      Plan  Hold diuretics   Monitor urine output   Urine sodium and creatinine   Consult Nephrology on case   Continue IVF  Patient may need renal biopsy    3/2/25  Creatinine improving   Discontinue intravenous fluids per nephrology   Nephrology on case    3/3/25  Continues to improve.   Hold Entresto and diuretics    3/4/25  improving

## 2025-03-06 NOTE — PT/OT/SLP PROGRESS
Occupational Therapy  Treatment    Mitch Whittaker   MRN: [id number]   Admitting Diagnosis: ANGELITO (acute kidney injury)    OT Date of Treatment: 03/06/25   OT Start Time: 1050  OT Stop Time: 1120  OT Total Time (min): 30 min    Billable Minutes:  Therapeutic Activity 15 MINUTES and Therapeutic Exercise 15 MINUTES    OT/ANNIA: OT     Number of ANNIA visits since last OT visit: 0    General Precautions: Standard, fall  Orthopedic Precautions: N/A  Braces: N/A  Respiratory Status: Nasal cannula, flow 2 L/min         Subjective:  Communicated with NURSE AND EPIC CHART prior to session.    Pain/Comfort  Pain Rating 1: 8/10  Location - Side 1: Bilateral  Location - Orientation 1: generalized  Location 1: leg    Objective:  Patient found with: telemetry, peripheral IV     Functional Mobility:  Bed Mobility:  ROLLING R<L MOD GIOVANNA   SUPINE<SIT MIN A   MAX A WITH SEATED SCOOTING FORWARD    Activities of Daily Living:  LE  DRESSING MAX A WITH  DOFFING PRESSURE RELIEF BOOTS      Balance:   Static Sit: FAIR+: Able to take MINIMAL challenges from all directions  Dynamic Sit: FAIR+: Maintains balance through MINIMAL excursions of active trunk motion  Therapeutic Activities and Exercises:  Educated patient on importance of increased tolerance to upright position and direct impact on CV endurance and strength. Patient encouraged to sit up EOB, for a minimum of 2 consecutive hours including for all meals.. Pt educated with HEP. Pt performed 1 set x 20 reps b ue rom exercise with weighted stick. Encouraged patient to perform AROM TE to BUE throughout the day within all available planes of motion (shoulder flexion, chest press, elbow flex/ext and hand/ digits flex/ext). Re enforced importance of utilizing call light to meet needs in room and not attempt to get up without staff assistance. Patient verbalized understanding and agreed to comply.           AM-PAC 6 CLICK ADL   How much help from another person does this patient currently need?   1  "= Unable, Total/Dependent Assistance  2 = A lot, Maximum/Moderate Assistance  3 = A little, Minimum/Contact Guard/Supervision  4 = None, Modified [place]/Independent    Putting on and taking off regular lower body clothing? : 2  Bathing (including washing, rinsing, drying)?: 2  Toileting, which includes using toilet, bedpan, or urinal? : 2  Putting on and taking off regular upper body clothing?: 2  Taking care of personal grooming such as brushing teeth?: 3  Eating meals?: 4  Daily Activity Total Score: 15     AM-PAC Raw Score CMS "G-Code Modifier Level of Impairment Assistance   6 % Total / Unable   7 - 8 CM 80 - 100% Maximal Assist   9-13 CL 60 - 80% Moderate Assist   14 - 19 CK 40 - 60% Moderate Assist   20 - 22 CJ 20 - 40% Minimal Assist   23 CI 1-20% SBA / CGA   24 CH 0% Independent/ Mod I       Patient left sitting edge of bed with all lines intact, call button in reach, nurse  notified, and spouse present    ASSESSMENT:  Mitch Whittaker is a 69 y.o. male with a medical diagnosis of ANGELITO (acute kidney injury) and presents with DEBILITY AND GENERALIZED WEAKNESS    Rehab identified problem list/impairments: FLOW([id number]:LAST:1::4:)@    Rehab potential is good.    Activity tolerance: Good    Discharge recommendations: Moderate Intensity Therapy   Barriers to discharge:      Equipment recommendations: none    GOALS:   Multidisciplinary Problems       Occupational Therapy Goals          Problem: Occupational Therapy    Goal Priority Disciplines Outcome Interventions   Occupational Therapy Goal     OT, PT/OT     Description: O.T. GOALS TO BE MET BY 3-18-25  PT WILL TOLERATE 1 SET X 15 REPS B UE ROM EXERCISE  MOD A WITH SUPINE<SIT TRANSFERS  MAX A WITH BSC TRANSFERS                       DME Justifications:  TBD DEPENDING ON NEXT LEVEL OF CARE    Plan:  Patient to be seen 2 x/week to address the above listed problems via self-care/home management, therapeutic activities, therapeutic exercises  Plan of " Care expires: 03/18/25  Plan of Care reviewed with: patient         03/06/2025

## 2025-03-06 NOTE — SUBJECTIVE & OBJECTIVE
Interval History: sitting upon side of bed. Family member assisting with bedbath. Waiting SNF placement.    Review of Systems   Constitutional:  Positive for activity change and fatigue. Negative for appetite change and fever.   Respiratory:  Positive for cough.    Cardiovascular:  Positive for leg swelling.   Genitourinary:  Negative for difficulty urinating, dysuria and hematuria.   Musculoskeletal:  Positive for arthralgias.   Allergic/Immunologic: Positive for immunocompromised state.   Neurological:  Positive for weakness.   Psychiatric/Behavioral:  Negative for agitation, behavioral problems, confusion, decreased concentration and dysphoric mood.      Objective:     Vital Signs (Most Recent):  Temp: 97.9 °F (36.6 °C) (03/06/25 1611)  Pulse: 93 (03/06/25 1611)  Resp: 18 (03/06/25 1611)  BP: (!) 141/71 (03/06/25 1611)  SpO2: 98 % (03/06/25 1611) Vital Signs (24h Range):  Temp:  [97.3 °F (36.3 °C)-98.4 °F (36.9 °C)] 97.9 °F (36.6 °C)  Pulse:  [71-96] 93  Resp:  [16-18] 18  SpO2:  [97 %-99 %] 98 %  BP: (108-156)/(59-71) 141/71     Weight: 104.6 kg (230 lb 9.6 oz)  Body mass index is 31.28 kg/m².    Intake/Output Summary (Last 24 hours) at 3/6/2025 1744  Last data filed at 3/5/2025 1851  Gross per 24 hour   Intake 733.33 ml   Output --   Net 733.33 ml         Physical Exam  Vitals and nursing note reviewed.   Constitutional:       General: He is not in acute distress.     Appearance: He is ill-appearing. He is not toxic-appearing.   HENT:      Head: Normocephalic and atraumatic.   Cardiovascular:      Rate and Rhythm: Normal rate.   Pulmonary:      Effort: Pulmonary effort is normal. No respiratory distress.   Abdominal:      Palpations: Abdomen is soft.      Tenderness: There is no abdominal tenderness.   Musculoskeletal:      Right lower leg: Edema present.      Left lower leg: Edema present.   Skin:     General: Skin is warm.   Neurological:      Mental Status: He is alert and oriented to person, place, and  time.      Motor: Weakness present.           Significant Labs: All pertinent labs within the past 24 hours have been reviewed.  CBC:   Recent Labs   Lab 03/05/25  0637 03/06/25  0553   WBC 2.94* 2.92*   HGB 7.8* 8.9*   HCT 28.1* 31.3*    239     CMP:   Recent Labs   Lab 03/05/25  0636 03/06/25  0553    146*   K 3.3* 4.0   * 114*   CO2 23 23   * 105   BUN 30* 29*   CREATININE 1.7* 1.5*   CALCIUM 8.5* 8.6*   ANIONGAP 9 9       Significant Imaging: I have reviewed all pertinent imaging results/findings within the past 24 hours.

## 2025-03-06 NOTE — PT/OT/SLP PROGRESS
Physical Therapy  Treatment    Mitch Whittaker   MRN: [id number]   Admitting Diagnosis: ANGELITO (acute kidney injury)    PT Received On: 03/06/25  PT Start Time: 1030     PT Stop Time: 1110    PT Total Time (min): 40 min       Billable Minutes:  Therapeutic Activity 30 and Therapeutic Exercise 10    Treatment Type: Treatment  PT/PTA: PT     Number of PTA visits since last PT visit: 0       General Precautions: Standard, fall  Orthopedic Precautions: N/A  Braces: N/A  Respiratory Status: Room air         Subjective:  Communicated with NURSE MAYS AND [facility] CHART REVIEW  prior to session.   PT MET IN RM SUP IN BED REFUSING P.T.     Pain/Comfort  Pain Rating 1: 6/10  Location - Side 1: Bilateral  Location 1: leg  Pain Addressed 1: Distraction, Reposition  Pain Rating Post-Intervention 1: 6/10    Objective:   Patient found with: peripheral IV, telemetry    Functional Mobility:  P.T. EDUCATED PT ON IMPORTANCE OF PARTICIPATION WHEN P.T. TX WAS OFFERED. PT REFUSING ALL TE AND BED MOBILITY AS PT DIDN'T WANT TO BE LEFT SEATED EOB AGAIN LIKE YESTERDAY. PT WIFE ENTERED ROOM AND BEGAN ENCOURAGING PT AS WELL. PT AGREED WITH MAX ENCOURAGEMENT. PT COMPLETED B LE TE AP, HS AND HIP ADD/ABD NOTED. PT APPEARED TO HAVE INC EFFORTS IN P.T. WITH WIFE PRESENT. PT SUP>SIT EOB WITH MIN A AND INC TIME. PT WITH DEC C/O B LE PAIN TODAY. PT SCOOTED IN BED WITH MOD A. PT EDUCATED TO SIT FOR LUNCH AND CALL BELL IN REACH TO CALL FOR ASSIST TO RETURN SUP WHEN READY. P.T. ALSO POSITIONED PILLOW BEHIND PT KNEES FOR INC COMFORT SITTING EOB.     Treatment and Education:   PT INSTRUCTED IN AP AND TKE WHILE SEATED EOB AS WELL. P.T. EDUCATED PT AND WIFE ON GOALS FOR NEXT TX FOR STANDING WITH RW. PT REPORTED UNDERSTANDING AND APPEAR ON BOARD WITH POC.      AM-PAC 6 CLICK MOBILITY  How much help from another person does this patient currently need?   1 = Unable, Total/Dependent Assistance  2 = A lot, Maximum/Moderate Assistance  3 = A little, Minimum/Contact  Guard/Supervision  4 = None, Modified [place]/Independent    Turning over in bed (including adjusting bedclothes, sheets and blankets)?: 3  Sitting down on and standing up from a chair with arms (e.g., wheelchair, bedside commode, etc.): 1  Moving from lying on back to sitting on the side of the bed?: 3  Moving to and from a bed to a chair (including a wheelchair)?: 1  Need to walk in hospital room?: 1  Climbing 3-5 steps with a railing?: 1  Basic Mobility Total Score: 10    AM-PAC Raw Score CMS G-Code Modifier Level of Impairment Assistance   6 % Total / Unable   7 - 9 CM 80 - 100% Maximal Assist   10 - 14 CL 60 - 80% Moderate Assist   15 - 19 CK 40 - 60% Moderate Assist   20 - 22 CJ 20 - 40% Minimal Assist   23 CI 1-20% SBA / CGA   24 CH 0% Independent/ Mod I     Patient left sitting edge of bed with all lines intact, call button in reach, bed alarm on, and WIFE present.    Assessment:  PT PROGRESSING WITH DEC ASSIST FOR GROSS FUNC MOBILITY. PT APPEARED TO HAVE INC MOTIVATION WITH WIFE PRESENT FOR TX.     Rehab identified problem list/impairments: weakness, impaired endurance, impaired self care skills, impaired functional mobility, gait instability, pain, impaired balance, decreased safety awareness, decreased lower extremity function, decreased upper extremity function, decreased ROM, impaired skin, decreased coordination    Rehab potential is fair.    Activity tolerance: Fair    Discharge recommendations: Moderate Intensity Therapy      Barriers to discharge:      Equipment recommendations: none     GOALS:   Multidisciplinary Problems       Physical Therapy Goals          Problem: Physical Therapy    Goal Priority Disciplines Outcome Interventions   Physical Therapy Goal     PT, PT/OT Progressing    Description: LTG: 3/16/25  1. PT WILL LOG ROLL IN BED WITH MIN A  2. PT WILL SIT EOB WITH MOD A FOR SITTING TOLERANCE  3. PT WILL COMPLETE B LE TE X 10 REPS TO INC ROM AND STRENGTHENING  4. PT WILL INC  AMPAC SCORE BY 2 POINTS TO PROGRESS GROSS FUNC MOBILITY.                          DME Justifications:  No DME recommended requiring DME justifications    PLAN:    Patient to be seen 3 x/week to address the above listed problems via therapeutic activities, therapeutic exercises  Plan of Care expires: 03/16/25  Plan of Care reviewed with: patient, spouse         03/06/2025

## 2025-03-06 NOTE — ASSESSMENT & PLAN NOTE
"Patient has Combined Systolic and Diastolic heart failure that is Chronic. On presentation their CHF was well compensated. Most recent BNP and echo results are listed below.  No results for input(s): "BNP" in the last 72 hours.    Latest ECHO  Results for orders placed during the hospital encounter of 11/26/24    Echo    Interpretation Summary    Left Ventricle: The left ventricle is normal in size. Normal wall thickness. There is concentric hypertrophy. Normal wall motion. Septal motion is consistent with bundle branch block. There is moderately reduced systolic function. Ejection fraction is approximately 35%. Grade I diastolic dysfunction.    Right Ventricle: Normal right ventricular cavity size. Wall thickness is normal. Systolic function is normal.    IVC/SVC: Normal venous pressure at 3 mmHg.    Current Heart Failure Medications  metoprolol succinate (TOPROL-XL) 24 hr tablet 25 mg, Daily, Oral  hydrALAZINE injection 10 mg, Every 6 hours PRN, Intravenous  bumetanide injection 1 mg, Once as needed, Intravenous    Plan  - Monitor strict I&Os and daily weights.    - Place on telemetry  - Low sodium diet  - Place on fluid restriction of 1.5 L.   - Cardiology has not been consulted  - The patient's volume status is at their baseline  - patient appears euvolemic    One time bumex after blood transfusion    3/6/25  Stable.    "

## 2025-03-06 NOTE — ASSESSMENT & PLAN NOTE
Anemia is likely due to acute blood loss which was from hematuria and chronic disease due to Chronic Kidney Disease. Most recent hemoglobin and hematocrit are listed below.  Recent Labs     03/04/25  0529 03/05/25  0637 03/06/25  0553   HGB 7.4* 7.8* 8.9*   HCT 26.0* 28.1* 31.3*     Plan  - Monitor serial CBC: Daily  - Transfuse PRBC if patient becomes hemodynamically unstable, symptomatic or H/H drops below 7/21.  - Patient has received 1 units of PRBCs on 3/1/25  - Patient's anemia is currently stable  - cbc in am    3/5/25  No more episodes of hematuria. Will give additional unit of blood today to reach goal of 8g/dL

## 2025-03-07 LAB
POCT GLUCOSE: 112 MG/DL (ref 70–110)
POCT GLUCOSE: 163 MG/DL (ref 70–110)
POCT GLUCOSE: 171 MG/DL (ref 70–110)
POCT GLUCOSE: 99 MG/DL (ref 70–110)
TACROLIMUS BLD-MCNC: 2.7 NG/ML (ref 5–15)

## 2025-03-07 PROCEDURE — 25000003 PHARM REV CODE 250: Performed by: FAMILY MEDICINE

## 2025-03-07 PROCEDURE — 63600175 PHARM REV CODE 636 W HCPCS: Performed by: FAMILY MEDICINE

## 2025-03-07 PROCEDURE — 27000207 HC ISOLATION

## 2025-03-07 PROCEDURE — 97530 THERAPEUTIC ACTIVITIES: CPT

## 2025-03-07 PROCEDURE — 25000003 PHARM REV CODE 250: Performed by: INTERNAL MEDICINE

## 2025-03-07 PROCEDURE — 63600175 PHARM REV CODE 636 W HCPCS: Performed by: INTERNAL MEDICINE

## 2025-03-07 PROCEDURE — 21400001 HC TELEMETRY ROOM

## 2025-03-07 PROCEDURE — 97110 THERAPEUTIC EXERCISES: CPT

## 2025-03-07 PROCEDURE — 63600175 PHARM REV CODE 636 W HCPCS: Performed by: NURSE PRACTITIONER

## 2025-03-07 RX ADMIN — POTASSIUM CHLORIDE 40 MEQ: 1500 TABLET, EXTENDED RELEASE ORAL at 08:03

## 2025-03-07 RX ADMIN — CIPROFLOXACIN HYDROCHLORIDE 1 DROP: 3 SOLUTION/ DROPS OPHTHALMIC at 01:03

## 2025-03-07 RX ADMIN — MAGNESIUM OXIDE TAB 400 MG (241.3 MG ELEMENTAL MG) 400 MG: 400 (241.3 MG) TAB at 08:03

## 2025-03-07 RX ADMIN — METOPROLOL SUCCINATE 25 MG: 25 TABLET, EXTENDED RELEASE ORAL at 08:03

## 2025-03-07 RX ADMIN — MYCOPHENOLATE MOFETIL 500 MG: 500 TABLET, FILM COATED ORAL at 09:03

## 2025-03-07 RX ADMIN — POTASSIUM CHLORIDE 40 MEQ: 1500 TABLET, EXTENDED RELEASE ORAL at 09:03

## 2025-03-07 RX ADMIN — GUAIFENESIN AND DEXTROMETHORPHAN 5 ML: 100; 10 SYRUP ORAL at 09:03

## 2025-03-07 RX ADMIN — TACROLIMUS 4 MG: 1 CAPSULE ORAL at 08:03

## 2025-03-07 RX ADMIN — TACROLIMUS 4 MG: 1 CAPSULE ORAL at 05:03

## 2025-03-07 RX ADMIN — FERROUS SULFATE TAB 325 MG (65 MG ELEMENTAL FE) 1 EACH: 325 (65 FE) TAB at 08:03

## 2025-03-07 RX ADMIN — INSULIN GLARGINE 20 UNITS: 100 INJECTION, SOLUTION SUBCUTANEOUS at 08:03

## 2025-03-07 RX ADMIN — INSULIN GLARGINE 20 UNITS: 100 INJECTION, SOLUTION SUBCUTANEOUS at 09:03

## 2025-03-07 RX ADMIN — MYCOPHENOLATE MOFETIL 500 MG: 500 TABLET, FILM COATED ORAL at 08:03

## 2025-03-07 RX ADMIN — PREDNISONE 5 MG: 5 TABLET ORAL at 08:03

## 2025-03-07 RX ADMIN — INSULIN ASPART 2 UNITS: 100 INJECTION, SOLUTION INTRAVENOUS; SUBCUTANEOUS at 05:03

## 2025-03-07 RX ADMIN — CALCITRIOL CAPSULES 0.25 MCG 0.25 MCG: 0.25 CAPSULE ORAL at 08:03

## 2025-03-07 NOTE — PROGRESS NOTES
[facility]– [place] Medicine  Progress Note    Patient Name: Mitch Whittaker  MRN: [id number]  Patient Class: IP- Inpatient   Admission Date: 2/26/2025  Length of Stay: 9 days  Attending Physician: Lnidsey Ferreira MD  Primary Care Provider: Valery Caal MD    Subjective     Principal Problem:ANGELITO (acute kidney injury)    HPI:  Patient is a 69-year-old  male with a PMH of CHF, kidney transplant, CAD, DM, DVT who presents to the ED with complaints of weakness.  Patient is a resident at [facility].  He states he was sent here for renal failure.  Patient does report still producing urine however it is decreased.  Does complain of some dysuria.  Reports that oral intake is good.  Denies any fever or chills.  No other issues reported at this time.    In the ED, H&H 8.0/27, K:  2.9, BUN/CR 60/4.3.  ED discussed with Dr. Gonsalez who recommended fluid challenge due to concern with over-diuresis.        Overview/Hospital Course:  02/27/2025  Will start empiric Rocephin for UTI.  Creatinine stable.  Continue IVF.  Nephrology on case.  Patient may need renal biopsy.  02/28/2025  Creatinine trending down.  Will continue IVF.  Gross hematuria improving.  Continue Rocephin for UTI.  3/1 creatinine continues to improve. Discontinue intravenous fluids per nephrology. Discontinue gan per nephrology, monitor for urinary retention. Discussed blood transfusion with nephrology and patient also agreeable. No transfusion reaction in the past.  3/2 urology consulted. hematuria improving. Denies abdominal pain. Complains of weakness and cough and leg swelling. Physical/occupational therapy consulted. Creatinine improving  3/3: creatinine continues to improve. Entresto and diuretics remains on hold. Episode of hematuria with clots overnight, but resolved this AM. Outpatient follow up with Urology for cystoscopy. Continue Rocephin for UTI  3/4: Creatine has returned to baseline. He has completed Rocephin for UTI.  Intermittent episodes of hematuria that began last night. Patient is urinating with difficulty. Per urology still recommends outpatient follow up. Awaiting decision for SNF versus HH.   3/5/25: patient and family are agreeable to SNF. Urine dark yellow today. Hemoglobin still below goal of 8g/dL. Will transfuse additional unit today and give Bumex post transfusion.   3/6/25: Awaiting placement at [facility]  3/7/25: facility is waiting on financial documents. Family will turn in this weekend or will discharge home with HH.    Interval History: [facility] is awaiting Financial Paperwork prior to SNF approval. Family will bring over the weekend.      Review of Systems   Constitutional:  Positive for activity change and fatigue. Negative for appetite change and fever.   Respiratory:  Positive for cough.    Cardiovascular:  Positive for leg swelling.   Genitourinary:  Negative for difficulty urinating, dysuria and hematuria.   Musculoskeletal:  Positive for arthralgias.   Allergic/Immunologic: Positive for immunocompromised state.   Neurological:  Positive for weakness.   Psychiatric/Behavioral:  Negative for agitation, behavioral problems, confusion, decreased concentration and dysphoric mood.      Objective:     Vital Signs (Most Recent):  Temp: 97.6 °F (36.4 °C) (03/07/25 1252)  Pulse: 88 (03/07/25 1301)  Resp: 16 (03/07/25 1252)  BP: 139/65 (03/07/25 1252)  SpO2: 98 % (03/07/25 1252) Vital Signs (24h Range):  Temp:  [97.6 °F (36.4 °C)-98.5 °F (36.9 °C)] 97.6 °F (36.4 °C)  Pulse:  [] 88  Resp:  [16-19] 16  SpO2:  [97 %-98 %] 98 %  BP: (125-150)/(65-83) 139/65     Weight: 104.6 kg (230 lb 9.6 oz)  Body mass index is 31.28 kg/m².    Intake/Output Summary (Last 24 hours) at 3/7/2025 1448  Last data filed at 3/7/2025 1150  Gross per 24 hour   Intake 240 ml   Output 1 ml   Net 239 ml         Physical Exam  Vitals and nursing note reviewed.   Constitutional:       General: He is not in acute distress.      Appearance: He is ill-appearing. He is not toxic-appearing.   HENT:      Head: Normocephalic and atraumatic.   Cardiovascular:      Rate and Rhythm: Normal rate.   Pulmonary:      Effort: Pulmonary effort is normal. No respiratory distress.   Abdominal:      Palpations: Abdomen is soft.      Tenderness: There is no abdominal tenderness.   Musculoskeletal:      Right lower leg: Edema present.      Left lower leg: Edema present.   Skin:     General: Skin is warm.   Neurological:      Mental Status: He is alert and oriented to person, place, and time.      Motor: Weakness present.           Significant Labs: All pertinent labs within the past 24 hours have been reviewed.  CBC:   Recent Labs   Lab 03/06/25  0553   WBC 2.92*   HGB 8.9*   HCT 31.3*        CMP:   Recent Labs   Lab 03/06/25  0553   *   K 4.0   *   CO2 23      BUN 29*   CREATININE 1.5*   CALCIUM 8.6*   ANIONGAP 9       Significant Imaging: I have reviewed all pertinent imaging results/findings within the past 24 hours.      Assessment & Plan  ANGELITO (acute kidney injury)  ANGELITO is likely due to pre-renal azotemia due to dehydration. Baseline creatinine is  1.4 . Most recent creatinine and eGFR are listed below.  Recent Labs     03/05/25  0636 03/06/25  0553   CREATININE 1.7* 1.5*   EGFRNORACEVR 43* 49*      Plan  Hold diuretics   Monitor urine output   Urine sodium and creatinine   Consult Nephrology on case   Continue IVF  Patient may need renal biopsy    3/2/25  Creatinine improving   Discontinue intravenous fluids per nephrology   Nephrology on case    3/3/25  Continues to improve.   Hold Entresto and diuretics    3/4/25  Improving    3/7/25  stable  Class 3 severe obesity due to excess calories with body mass index (BMI) of 40.0 to 44.9 in adult  Body mass index is 31.28 kg/m². Morbid obesity complicates all aspects of disease management from diagnostic modalities to treatment. Weight loss encouraged and health benefits explained to  "patient.   Physical/occupational therapy     Chronic immunosuppression with Prograf, MMF and prednisone  Will hold CellCept for now Continue Prograf   Prograf levels 3.4  Continue prednisone    Type II diabetes mellitus with renal manifestations  Patient's FSGs are controlled on current medication regimen.  Last A1c reviewed-   Lab Results   Component Value Date    HGBA1C 5.7 (H) 12/17/2024     Most recent fingerstick glucose reviewed-   Recent Labs   Lab 03/06/25  1744 03/06/25 2024 03/07/25  0544   POCTGLUCOSE 79 92 99     Current correctional scale  Medium  Maintain anti-hyperglycemic dose as follows-   Antihyperglycemics (From admission, onward)      Start     Stop Route Frequency Ordered    03/04/25 0900  insulin glargine U-100 (Lantus) pen 20 Units         -- SubQ 2 times daily 03/04/25 0811    02/26/25 1551  insulin aspart U-100 pen 0-10 Units         -- SubQ Before meals & nightly PRN 02/26/25 1451          Hold Oral hypoglycemics while patient is in the hospital.  Continue adjusting insulin needs as indicated     3/4/25   Glucose above goal  Optimize long acting 20 units BID    3/5/25  Glucose better controlled today  Chronic combined systolic and diastolic congestive heart failure  Patient has Combined Systolic and Diastolic heart failure that is Chronic. On presentation their CHF was well compensated. Most recent BNP and echo results are listed below.  No results for input(s): "BNP" in the last 72 hours.    Latest ECHO  Results for orders placed during the hospital encounter of 11/26/24    Echo    Interpretation Summary    Left Ventricle: The left ventricle is normal in size. Normal wall thickness. There is concentric hypertrophy. Normal wall motion. Septal motion is consistent with bundle branch block. There is moderately reduced systolic function. Ejection fraction is approximately 35%. Grade I diastolic dysfunction.    Right Ventricle: Normal right ventricular cavity size. Wall thickness is normal. " Systolic function is normal.    IVC/SVC: Normal venous pressure at 3 mmHg.    Current Heart Failure Medications  metoprolol succinate (TOPROL-XL) 24 hr tablet 25 mg, Daily, Oral  hydrALAZINE injection 10 mg, Every 6 hours PRN, Intravenous  bumetanide injection 1 mg, Once as needed, Intravenous    Plan  - Monitor strict I&Os and daily weights.    - Place on telemetry  - Low sodium diet  - Place on fluid restriction of 1.5 L.   - Cardiology has not been consulted  - The patient's volume status is at their baseline  - patient appears euvolemic    One time bumex after blood transfusion    3/6/25  Stable.    Deep vein thrombosis (DVT) of lower extremity  Due to hematuria   Will hold anticoagulation for now  Hb/hct in am       3/5/25  No further episodes of hematuria. Will likely restart anticoagulation after Urology visit  Chronic cough  Follows pulmonology outpatient  Former smoker  Complains of dry cough but no dyspnea  Schedule breathing treatments and monitor response    Gross hematuria  Possibly related to Gan insertion   Will continue to monitor   should hematuria persists will plan to consult Urology on case  Hematuria improving  Discontinue gan  Agreeable to blood transfusion  Urology note reviewed  Continue intravenous antibiotic(s)   Will need outpatient urology for cystoscopy     3/4/25  Intermittent episodes of hematuria.   No indication for further workup IP if no urinary retention per Urology.   Will follow up outpatient    3/5/25  No episodes of hematuria today  Will plan for Urology OP visit once discharged      3/7/25  Appt scheduled for Dr. Camilo on 3/17/25 at 1100 on [place]. He will hold anticoagulation until then  UTI (urinary tract infection)  Continue Rocephin.   Completed  3//3/25      Anemia, chronic disease  Anemia is likely due to acute blood loss which was from hematuria and chronic disease due to Chronic Kidney Disease. Most recent hemoglobin and hematocrit are listed below.  Recent Labs      03/05/25  0637 03/06/25  0553   HGB 7.8* 8.9*   HCT 28.1* 31.3*     Plan  - Monitor serial CBC: Daily  - Transfuse PRBC if patient becomes hemodynamically unstable, symptomatic or H/H drops below 7/21.  - Patient has received 1 units of PRBCs on 3/1/25  - Patient's anemia is currently stable  - cbc in am    3/5/25  No more episodes of hematuria. Will give additional unit of blood today to reach goal of 8g/dL    3/7/25  Stable    VTE Risk Mitigation (From admission, onward)           Ordered     Place sequential compression device  Until discontinued         02/26/25 1607                    Discharge Planning   GRETEL:      Code Status: Full Code   Medical Readiness for Discharge Date:   Discharge Plan A: Skilled Nursing Facility        Darya Maravilla NP  Department of Hospital Medicine   [facility] - Med Surg

## 2025-03-07 NOTE — PT/OT/SLP PROGRESS
"Physical Therapy  Treatment    Mitch Whittaker   MRN: [id number]   Admitting Diagnosis: ANGELITO (acute kidney injury)    PT Received On: 03/07/25  PT Start Time: 1040     PT Stop Time: 1105    PT Total Time (min): 25 min       Billable Minutes:  Therapeutic Activity 15 and Therapeutic Exercise 10    Treatment Type: Treatment  PT/PTA: PT     Number of PTA visits since last PT visit: 0       General Precautions: Standard, fall  Orthopedic Precautions: N/A  Braces: N/A  Respiratory Status: Room air         Subjective:  Communicated with NURSE KAREN AND EPIC CHART REVIEW  prior to session.   PT MET IN RM SUP IN BED AND AGREED TO TX    Pain/Comfort  Pain Rating 1: 5/10  Location - Side 1: Bilateral  Location 1: leg  Pain Addressed 1: Reposition  Pain Rating Post-Intervention 1: 5/10    Objective:   Patient found with: peripheral IV, telemetry    Functional Mobility:  PT LOG ROLLED TO LEFT AND  SEATED EOB WITH MIN A. PT SCOOTED TO EOB WITH MOD A. GT. BELT AND  SOCKS DONNED PRIOR TO OOB MOBILITY.   PT ATTEMPTED SIT>STAND WITH BED ELEVATED AND MAX A HOWEVER UNABLE TO CLEAR BOTTOM FROM BED ON MULT ATTEMPTS. PT COMPLETED BED RAIL PUSH UPS 2X5 REPS FOR STRENGTHENING.     Treatment and Education:  PT COMPLETED AP, TKE AND MIP X 10 REPS FOR LE STRENGTHENING. PT EDUCATED ON "CALL DON'T FALL", ENCOURAGED TO CALL FOR ASSISTANCE WITH ALL NEEDS FOR OOB MOBILITY.  PT LEFT SEATED EOB FOR UPRIGHT TOLERANCE.      AM-PAC 6 CLICK MOBILITY  How much help from another person does this patient currently need?   1 = Unable, Total/Dependent Assistance  2 = A lot, Maximum/Moderate Assistance  3 = A little, Minimum/Contact Guard/Supervision  4 = None, Modified [place]/Independent    Turning over in bed (including adjusting bedclothes, sheets and blankets)?: 3  Sitting down on and standing up from a chair with arms (e.g., wheelchair, bedside commode, etc.): 1  Moving from lying on back to sitting on the side of the bed?: 3  Moving to and from a " bed to a chair (including a wheelchair)?: 1  Need to walk in hospital room?: 1  Climbing 3-5 steps with a railing?: 1  Basic Mobility Total Score: 10    AM-PAC Raw Score CMS G-Code Modifier Level of Impairment Assistance   6 % Total / Unable   7 - 9 CM 80 - 100% Maximal Assist   10 - 14 CL 60 - 80% Moderate Assist   15 - 19 CK 40 - 60% Moderate Assist   20 - 22 CJ 20 - 40% Minimal Assist   23 CI 1-20% SBA / CGA   24 CH 0% Independent/ Mod I     Patient left sitting edge of bed with call button in reach and NURSE notified.    Assessment:  PT RADHA TX WELL. INC MOTIVATION NOTED     Rehab identified problem list/impairments: weakness, impaired endurance, impaired self care skills, impaired functional mobility, gait instability, impaired balance, pain, decreased lower extremity function, edema, impaired skin, decreased upper extremity function, decreased coordination, decreased ROM, impaired muscle length    Rehab potential is fair.    Activity tolerance: Fair    Discharge recommendations: Moderate Intensity Therapy      Barriers to discharge:      Equipment recommendations: none     GOALS:   Multidisciplinary Problems       Physical Therapy Goals          Problem: Physical Therapy    Goal Priority Disciplines Outcome Interventions   Physical Therapy Goal     PT, PT/OT Progressing    Description: LTG: 3/16/25  1. PT WILL LOG ROLL IN BED WITH MIN A  2. PT WILL SIT EOB WITH MOD A FOR SITTING TOLERANCE  3. PT WILL COMPLETE B LE TE X 10 REPS TO INC ROM AND STRENGTHENING  4. PT WILL INC AMPAC SCORE BY 2 POINTS TO PROGRESS GROSS FUNC MOBILITY.                          DME Justifications:  No DME recommended requiring DME justifications    PLAN:    Patient to be seen 3 x/week to address the above listed problems via therapeutic activities, therapeutic exercises  Plan of Care expires: 03/16/25  Plan of Care reviewed with: patient         03/07/2025

## 2025-03-07 NOTE — ASSESSMENT & PLAN NOTE
Anemia is likely due to acute blood loss which was from hematuria and chronic disease due to Chronic Kidney Disease. Most recent hemoglobin and hematocrit are listed below.  Recent Labs     03/05/25  0637 03/06/25  0553   HGB 7.8* 8.9*   HCT 28.1* 31.3*     Plan  - Monitor serial CBC: Daily  - Transfuse PRBC if patient becomes hemodynamically unstable, symptomatic or H/H drops below 7/21.  - Patient has received 1 units of PRBCs on 3/1/25  - Patient's anemia is currently stable  - cbc in am    3/5/25  No more episodes of hematuria. Will give additional unit of blood today to reach goal of 8g/dL    3/7/25  Stable

## 2025-03-07 NOTE — ASSESSMENT & PLAN NOTE
ANGELITO is likely due to pre-renal azotemia due to dehydration. Baseline creatinine is 1.4. Most recent creatinine and eGFR are listed below.  Recent Labs     03/05/25  0636 03/06/25  0553   CREATININE 1.7* 1.5*   EGFRNORACEVR 43* 49*      Plan  Hold diuretics   Monitor urine output   Urine sodium and creatinine   Consult Nephrology on case   Continue IVF  Patient may need renal biopsy    3/2/25  Creatinine improving   Discontinue intravenous fluids per nephrology   Nephrology on case    3/3/25  Continues to improve.   Hold Entresto and diuretics    3/4/25  Improving    3/7/25  stable

## 2025-03-07 NOTE — PROGRESS NOTES
Inpatient Nutrition Assessment    Admit Date: 2/26/2025   Total duration of encounter: 9 days   Patient Age: 69 y.o.    Nutrition Recommendation/Prescription     Continue current diet (Diet diabetic 2000 Calories (up to 75 gm per meal)) as tolerated.   Add Hari (provides 90 kcal, 2.5 g protein per serving) BID  Monitor PO intake, labs, and wt changes  Recommend updated wts twice weekly.     Communication of Recommendations:  EMR    Nutrition Assessment     Malnutrition Assessment/Nutrition-Focused Physical Exam  Unable to assess at this time.                                                               A minimum of two characteristics is recommended for diagnosis of either severe or non-severe malnutrition.    Chart Review    Reason Seen: length of stay    Malnutrition Screening Tool Results   Have you recently lost weight without trying?: No  Have you been eating poorly because of a decreased appetite?: No   MST Score: 0   Diagnosis:  ANGELITO  Gross Hematuria  DVT  Chronic combined systolic and diastolic CHF  T2DM with renal manifestations    Relevant Medical History:   CHF, kidney transplant, CAD, DM, DVT     Scheduled Medications:  calcitRIOL, 0.25 mcg, Daily  ciprofloxacin HCl, 1 drop, Q6H  ferrous sulfate, 1 tablet, Daily  insulin glargine U-100, 20 Units, BID  magnesium oxide, 400 mg, Daily  metoprolol succinate, 25 mg, Daily  mycophenolate, 500 mg, BID  potassium chloride, 40 mEq, BID  predniSONE, 5 mg, Daily  tacrolimus, 4 mg, Daily AM   And  tacrolimus, 4 mg, Daily PM    Continuous Infusions:   PRN Medications:  0.9%  NaCl infusion (for blood administration), , Q24H PRN  0.9%  NaCl infusion (for blood administration), , Q24H PRN  acetaminophen, 650 mg, Q6H PRN  amitriptyline, 25 mg, Nightly PRN  bumetanide, 1 mg, Once PRN  dextromethorphan-guaiFENesin  mg/5 ml, 5 mL, Q4H PRN  dextrose 50%, 12.5 g, PRN  dextrose 50%, 25 g, PRN  glucagon (human recombinant), 1 mg, PRN  glucose, 16 g, PRN  glucose, 24 g,  PRN  hydrALAZINE, 10 mg, Q6H PRN  HYDROcodone-acetaminophen, 1 tablet, Q8H PRN  insulin aspart U-100, 0-10 Units, QID (AC + HS) PRN  ondansetron, 4 mg, Q6H PRN    Calorie Containing IV Medications: no significant kcals from medications at this time    Recent Labs   Lab 03/01/25  0441 03/02/25  0555 03/02/25  1507 03/03/25  0650 03/03/25  1049 03/04/25  0529 03/05/25  0636 03/05/25  0637 03/06/25  0553    142  --   --  142 141 144  --  146*   K 3.3* 3.6  --   --  3.3* 3.4* 3.3*  --  4.0   CALCIUM 9.0 8.8  --   --  8.9 8.1* 8.5*  --  8.6*   PHOS  --   --   --   --   --  2.9  --   --   --    MG  --   --   --   --   --  1.5*  --   --  1.6   * 111*  --   --  109 111* 112*  --  114*   CO2 20* 21*  --   --  24 23 23  --  23   BUN 43* 36*  --   --  32* 30* 30*  --  29*   CREATININE 2.6* 2.2*  --   --  2.0* 1.8* 1.7*  --  1.5*   EGFRNORACEVR 26* 31*  --   --  35* 40* 43*  --  49*   BILITOT  --  0.4  --   --   --   --   --   --   --    ALKPHOS  --  104  --   --   --   --   --   --   --    ALT  --  23  --   --   --   --   --   --   --    AST  --  17  --   --   --   --   --   --   --    ALBUMIN  --  1.6*  --   --   --  1.6*  --   --   --    WBC 3.73*  --   --  2.81*  --  2.66*  --  2.94* 2.92*   HGB 7.3*  --  8.8* 7.6*  --  7.4*  --  7.8* 8.9*   HCT 25.9*  --  30.3* 25.7*  --  26.0*  --  28.1* 31.3*     Nutrition Orders:  Diet diabetic 2000 Calories (up to 75 gm per meal)      Appetite/Oral Intake: good/% of meals  Factors Affecting Nutritional Intake: none identified  Social Needs Impacting Access to Food: unable to assess at this time; will attempt on follow-up  Food/Jainism/Cultural Preferences: unable to obtain  Food Allergies: no known food allergies  Last Bowel Movement: 03/07/25  Wound(s):     Wound 03/01/25 1901 Pressure Injury Right Heel-Tissue loss description: Partial thickness noted    Comments    3/7/25: Dietitian working remotely. Per MST, with denies any decreased appetite or unintended wt  loss PTA. EMR wts show a 57 lb wt loss between 1/28/25 and 3/4/25. Unable to confirm with pt at this time but suspect error. Bedscale wt at admit measured 266 lbs which is more consistent with wt history, not indicative of any significant wt loss. Documented intake x3 days show % consumption of meals. Hari added to aid in wound healing. NFPE to be performed by on-site RD at follow-up as feasible.     Anthropometrics    Height: 6' (182.9 cm), Height Method: Stated  Last Weight: 104.6 kg (230 lb 9.6 oz) (03/04/25 0458), Weight Method: Bed Scale  BMI (Calculated): 31.3  BMI Classification: obese grade I (BMI 30-34.9)        Ideal Body Weight (IBW), Male: 178 lb     % Ideal Body Weight, Male (lb): 149.49 %                          Usual Weight Provided By: EMR weight history    Wt Readings from Last 5 Encounters:   03/04/25 104.6 kg (230 lb 9.6 oz)   01/28/25 130.5 kg (287 lb 11.2 oz)   12/30/24 120.7 kg (265 lb 15.8 oz)   12/20/24 123 kg (271 lb 2.7 oz)   12/10/24 122 kg (268 lb 15.4 oz)     Weight Change(s) Since Admission:   Wt Readings from Last 1 Encounters:   03/04/25 0458 104.6 kg (230 lb 9.6 oz)   02/28/25 2244 120.7 kg (266 lb 1.5 oz)   02/26/25 1501 120.7 kg (266 lb 1.5 oz)   Admit Weight: 120.7 kg (266 lb 1.5 oz) (02/26/25 1501), Weight Method: Bed Scale    Estimated Needs    Weight Used For Calorie Calculations: 104.6 kg (230 lb 9.6 oz)  Energy Calorie Requirements (kcal): 2206 kcals (1.2 SFxMSJ)  Energy Need Method: Hudspeth-St Jeor  Weight Used For Protein Calculations: 104.6 kg (230 lb 9.6 oz)  Protein Requirements: 115-136 g (1.1-1.3 g/kg)  Fluid Requirements (mL): 1000 mL + UOP  (or per provider)  CHO Requirement: 248 g/day (45% of total EEN)     Enteral Nutrition     Patient not receiving enteral nutrition at this time.    Parenteral Nutrition     Patient not receiving parenteral nutrition support at this time.    Evaluation of Received Nutrient Intake    Calories: meeting estimated  needs  Protein: meeting estimated needs    Patient Education     Not applicable.    Nutrition Diagnosis     PES: Increased nutrient needs (kcal/pro) related to increased protein energy demand for wound healing as evidenced by R heel partial thickness wound. (new)     PES:            Nutrition Interventions     Intervention(s): modified composition of meals/snacks and commercial beverage  Intervention(s):      Goal: Meet greater than 80% of nutritional needs by follow-up. (new)  Goal: Consume % of oral supplements by follow-up. (new)    Nutrition Goals & Monitoring     Dietitian will monitor: energy intake, weight, electrolyte/renal panel, and glucose/endocrine profile  Discharge planning: continue Diabetic diet  Nutrition Risk/Follow-Up: moderate (follow-up in 3-5 days)   Please consult if re-assessment needed sooner.

## 2025-03-07 NOTE — ASSESSMENT & PLAN NOTE
Patient's FSGs are controlled on current medication regimen.  Last A1c reviewed-   Lab Results   Component Value Date    HGBA1C 5.7 (H) 12/17/2024     Most recent fingerstick glucose reviewed-   Recent Labs   Lab 03/06/25  1744 03/06/25 2024 03/07/25  0544   POCTGLUCOSE 79 92 99     Current correctional scale  Medium  Maintain anti-hyperglycemic dose as follows-   Antihyperglycemics (From admission, onward)      Start     Stop Route Frequency Ordered    03/04/25 0900  insulin glargine U-100 (Lantus) pen 20 Units         -- SubQ 2 times daily 03/04/25 0811    02/26/25 1551  insulin aspart U-100 pen 0-10 Units         -- SubQ Before meals & nightly PRN 02/26/25 1451          Hold Oral hypoglycemics while patient is in the hospital.  Continue adjusting insulin needs as indicated     3/4/25   Glucose above goal  Optimize long acting 20 units BID    3/5/25  Glucose better controlled today

## 2025-03-07 NOTE — SUBJECTIVE & OBJECTIVE
Interval History: Yunior Rashid is awaiting Financial Paperwork prior to SNF approval. Family will bring over the weekend.      Review of Systems   Constitutional:  Positive for activity change and fatigue. Negative for appetite change and fever.   Respiratory:  Positive for cough.    Cardiovascular:  Positive for leg swelling.   Genitourinary:  Negative for difficulty urinating, dysuria and hematuria.   Musculoskeletal:  Positive for arthralgias.   Allergic/Immunologic: Positive for immunocompromised state.   Neurological:  Positive for weakness.   Psychiatric/Behavioral:  Negative for agitation, behavioral problems, confusion, decreased concentration and dysphoric mood.      Objective:     Vital Signs (Most Recent):  Temp: 97.6 °F (36.4 °C) (03/07/25 1252)  Pulse: 88 (03/07/25 1301)  Resp: 16 (03/07/25 1252)  BP: 139/65 (03/07/25 1252)  SpO2: 98 % (03/07/25 1252) Vital Signs (24h Range):  Temp:  [97.6 °F (36.4 °C)-98.5 °F (36.9 °C)] 97.6 °F (36.4 °C)  Pulse:  [] 88  Resp:  [16-19] 16  SpO2:  [97 %-98 %] 98 %  BP: (125-150)/(65-83) 139/65     Weight: 104.6 kg (230 lb 9.6 oz)  Body mass index is 31.28 kg/m².    Intake/Output Summary (Last 24 hours) at 3/7/2025 1448  Last data filed at 3/7/2025 1150  Gross per 24 hour   Intake 240 ml   Output 1 ml   Net 239 ml         Physical Exam  Vitals and nursing note reviewed.   Constitutional:       General: He is not in acute distress.     Appearance: He is ill-appearing. He is not toxic-appearing.   HENT:      Head: Normocephalic and atraumatic.   Cardiovascular:      Rate and Rhythm: Normal rate.   Pulmonary:      Effort: Pulmonary effort is normal. No respiratory distress.   Abdominal:      Palpations: Abdomen is soft.      Tenderness: There is no abdominal tenderness.   Musculoskeletal:      Right lower leg: Edema present.      Left lower leg: Edema present.   Skin:     General: Skin is warm.   Neurological:      Mental Status: He is alert and oriented to person,  place, and time.      Motor: Weakness present.           Significant Labs: All pertinent labs within the past 24 hours have been reviewed.  CBC:   Recent Labs   Lab 03/06/25  0553   WBC 2.92*   HGB 8.9*   HCT 31.3*        CMP:   Recent Labs   Lab 03/06/25  0553   *   K 4.0   *   CO2 23      BUN 29*   CREATININE 1.5*   CALCIUM 8.6*   ANIONGAP 9       Significant Imaging: I have reviewed all pertinent imaging results/findings within the past 24 hours.

## 2025-03-07 NOTE — PLAN OF CARE
HECTOR spoke with pt's daughter regarding the completion of the medicaid application and financials. Pt's daughter stated they are trying to work on it but need help. HECTOR requested Lelia from [facility] to help family. HECTOR explained the importance of getting paperwork done today due to pt being medically clear.

## 2025-03-07 NOTE — PLAN OF CARE
Nutrition Recommendation/Prescription      Continue current diet (Diet diabetic 2000 Calories (up to 75 gm per meal)) as tolerated.   Add Hari (provides 90 kcal, 2.5 g protein per serving) BID  Monitor PO intake, labs, and wt changes  Recommend updated wts twice weekly.        Nutrition Interventions      Intervention(s): modified composition of meals/snacks and commercial beverage     Goal: Meet greater than 80% of nutritional needs by follow-up. (new)  Goal: Consume % of oral supplements by follow-up. (new)    Irina Mcarthur, MS, RD, LDN

## 2025-03-07 NOTE — PT/OT/SLP PROGRESS
Occupational Therapy  Treatment    Mitch Whittaker   MRN: [id number]   Admitting Diagnosis: ANGELITO (acute kidney injury)    OT Date of Treatment: 03/07/25   OT Start Time: 1100  OT Stop Time: 1125  OT Total Time (min): 25 min    Billable Minutes:  Therapeutic Activity 15 MINUTES and Therapeutic Exercise 10 MINUTES    OT/ANNIA: OT     Number of ANNIA visits since last OT visit: 0    General Precautions: Standard, fall  Orthopedic Precautions: N/A  Braces: N/A  Respiratory Status: Room air         Subjective:  Communicated with NURSE AND EPIC CHART REVIEW prior to session.    Pain/Comfort  Pain Rating 1: 6/10  Location - Orientation 1: generalized  Location 1: leg  Pain Rating Post-Intervention 1: 6/10    Objective:  Patient found with: peripheral IV, telemetry     Functional Mobility:  Bed Mobility:   MIN A WITH ROLLING R<L AND LEG LIFT    Transfers:   MAX A WITH SIT<STAND AND PT UNABLE TO COMPLETE      Activities of Daily Living:     LE TOTAL A WITH JEANMARIE SOCKS   Balance:   Static Sit: FAIR+: Able to take MINIMAL challenges from all directions  Dynamic Sit: FAIR+: Maintains balance through MINIMAL excursions of active trunk motion  Therapeutic Activities and Exercises:  Educated patient on importance of increased tolerance to upright position and direct impact on CV endurance and strength. Patient encouraged to sit up in chair/ EOB, for a minimum of 2 consecutive hours including for all meals.. Pt educated with HEP. Pt performed 1 set x 10 reps B UE with weighted stick (shoulder flexion, chest press, elbow flex/ext) hand/ digits. Encouraged patient to perform AROM TE to BUE throughout the day within all available planes of motion. Re enforced importance of utilizing call light to meet needs in room and not attempt to get up without staff assistance. Patient verbalized understanding and agreed to comply.           AM-PAC 6 CLICK ADL   How much help from another person does this patient currently need?   1 = Unable,  "Total/Dependent Assistance  2 = A lot, Maximum/Moderate Assistance  3 = A little, Minimum/Contact Guard/Supervision  4 = None, Modified [place]/Independent    Putting on and taking off regular lower body clothing? : 2  Bathing (including washing, rinsing, drying)?: 2  Toileting, which includes using toilet, bedpan, or urinal? : 2  Putting on and taking off regular upper body clothing?: 2  Taking care of personal grooming such as brushing teeth?: 3  Eating meals?: 4  Daily Activity Total Score: 15     AM-PAC Raw Score CMS "G-Code Modifier Level of Impairment Assistance   6 % Total / Unable   7 - 8 CM 80 - 100% Maximal Assist   9-13 CL 60 - 80% Moderate Assist   14 - 19 CK 40 - 60% Moderate Assist   20 - 22 CJ 20 - 40% Minimal Assist   23 CI 1-20% SBA / CGA   24 CH 0% Independent/ Mod I       Patient left up in chair with all lines intact, call button in reach, and nurse notified    ASSESSMENT:  Mitch Whittaker is a 69 y.o. male with a medical diagnosis of ANGELITO (acute kidney injury) and presents with debility and generalized weakness.    Rehab potential is good.    Activity tolerance: Good    Discharge recommendations: Moderate Intensity Therapy   Barriers to discharge:      Equipment recommendations: none    GOALS:   Multidisciplinary Problems       Occupational Therapy Goals          Problem: Occupational Therapy    Goal Priority Disciplines Outcome Interventions   Occupational Therapy Goal     OT, PT/OT Progressing    Description: O.T. GOALS TO BE MET BY 3-18-25  PT WILL TOLERATE 1 SET X 15 REPS B UE ROM EXERCISE  MOD A WITH SUPINE<SIT TRANSFERS  MAX A WITH BSC TRANSFERS                       DME Justifications:  TBD ON NEXT LEVEL OF CAR    Plan:  Patient to be seen 2 x/week to address the above listed problems via self-care/home management, therapeutic exercises, therapeutic activities  Plan of Care expires: 03/18/25  Plan of Care reviewed with: patient         03/07/2025  "

## 2025-03-07 NOTE — ASSESSMENT & PLAN NOTE
Possibly related to Gan insertion   Will continue to monitor   should hematuria persists will plan to consult Urology on case  Hematuria improving  Discontinue gan  Agreeable to blood transfusion  Urology note reviewed  Continue intravenous antibiotic(s)   Will need outpatient urology for cystoscopy     3/4/25  Intermittent episodes of hematuria.   No indication for further workup IP if no urinary retention per Urology.   Will follow up outpatient    3/5/25  No episodes of hematuria today  Will plan for Urology OP visit once discharged      3/7/25  Appt scheduled for Dr. Camiol on 3/17/25 at 1100 on [place]. He will hold anticoagulation until then

## 2025-03-08 LAB
ACANTHOCYTES BLD QL SMEAR: PRESENT
ANION GAP SERPL CALC-SCNC: 10 MMOL/L (ref 8–16)
ANISOCYTOSIS BLD QL SMEAR: SLIGHT
BASOPHILS # BLD AUTO: ABNORMAL K/UL (ref 0–0.2)
BASOPHILS NFR BLD: 0 % (ref 0–1.9)
BUN SERPL-MCNC: 28 MG/DL (ref 8–23)
CALCIUM SERPL-MCNC: 9.1 MG/DL (ref 8.7–10.5)
CHLORIDE SERPL-SCNC: 114 MMOL/L (ref 95–110)
CO2 SERPL-SCNC: 20 MMOL/L (ref 23–29)
CREAT SERPL-MCNC: 1.3 MG/DL (ref 0.5–1.4)
DIFFERENTIAL METHOD BLD: ABNORMAL
EOSINOPHIL # BLD AUTO: ABNORMAL K/UL (ref 0–0.5)
EOSINOPHIL NFR BLD: 3 % (ref 0–8)
ERYTHROCYTE [DISTWIDTH] IN BLOOD BY AUTOMATED COUNT: 15.8 % (ref 11.5–14.5)
EST. GFR  (NO RACE VARIABLE): 59 ML/MIN/1.73 M^2
GLUCOSE SERPL-MCNC: 58 MG/DL (ref 70–110)
HCT VFR BLD AUTO: 33.1 % (ref 40–54)
HGB BLD-MCNC: 9.4 G/DL (ref 14–18)
IMM GRANULOCYTES # BLD AUTO: ABNORMAL K/UL (ref 0–0.04)
IMM GRANULOCYTES NFR BLD AUTO: ABNORMAL % (ref 0–0.5)
LYMPHOCYTES # BLD AUTO: ABNORMAL K/UL (ref 1–4.8)
LYMPHOCYTES NFR BLD: 39 % (ref 18–48)
MCH RBC QN AUTO: 24.4 PG (ref 27–31)
MCHC RBC AUTO-ENTMCNC: 28.4 G/DL (ref 32–36)
MCV RBC AUTO: 86 FL (ref 82–98)
MONOCYTES # BLD AUTO: ABNORMAL K/UL (ref 0.3–1)
MONOCYTES NFR BLD: 12 % (ref 4–15)
MYELOCYTES NFR BLD MANUAL: 1 %
NEUTROPHILS NFR BLD: 44 % (ref 38–73)
NEUTS BAND NFR BLD MANUAL: 1 %
NRBC BLD-RTO: 0 /100 WBC
OVALOCYTES BLD QL SMEAR: ABNORMAL
PLATELET # BLD AUTO: 247 K/UL (ref 150–450)
PLATELET BLD QL SMEAR: ABNORMAL
PMV BLD AUTO: 10.4 FL (ref 9.2–12.9)
POCT GLUCOSE: 123 MG/DL (ref 70–110)
POCT GLUCOSE: 55 MG/DL (ref 70–110)
POCT GLUCOSE: 88 MG/DL (ref 70–110)
POCT GLUCOSE: 91 MG/DL (ref 70–110)
POCT GLUCOSE: 94 MG/DL (ref 70–110)
POIKILOCYTOSIS BLD QL SMEAR: SLIGHT
POTASSIUM SERPL-SCNC: 4.2 MMOL/L (ref 3.5–5.1)
RBC # BLD AUTO: 3.85 M/UL (ref 4.6–6.2)
SCHISTOCYTES BLD QL SMEAR: PRESENT
SODIUM SERPL-SCNC: 144 MMOL/L (ref 136–145)
WBC # BLD AUTO: 3.27 K/UL (ref 3.9–12.7)

## 2025-03-08 PROCEDURE — 85027 COMPLETE CBC AUTOMATED: CPT | Performed by: NURSE PRACTITIONER

## 2025-03-08 PROCEDURE — 63600175 PHARM REV CODE 636 W HCPCS: Performed by: INTERNAL MEDICINE

## 2025-03-08 PROCEDURE — 85007 BL SMEAR W/DIFF WBC COUNT: CPT | Performed by: NURSE PRACTITIONER

## 2025-03-08 PROCEDURE — 80048 BASIC METABOLIC PNL TOTAL CA: CPT | Performed by: NURSE PRACTITIONER

## 2025-03-08 PROCEDURE — 25000003 PHARM REV CODE 250: Performed by: INTERNAL MEDICINE

## 2025-03-08 PROCEDURE — 25000003 PHARM REV CODE 250: Performed by: FAMILY MEDICINE

## 2025-03-08 PROCEDURE — 63600175 PHARM REV CODE 636 W HCPCS: Performed by: FAMILY MEDICINE

## 2025-03-08 PROCEDURE — 21400001 HC TELEMETRY ROOM

## 2025-03-08 PROCEDURE — 36415 COLL VENOUS BLD VENIPUNCTURE: CPT | Performed by: NURSE PRACTITIONER

## 2025-03-08 PROCEDURE — 27000207 HC ISOLATION

## 2025-03-08 RX ORDER — INSULIN GLARGINE 100 [IU]/ML
15 INJECTION, SOLUTION SUBCUTANEOUS 2 TIMES DAILY
Status: DISCONTINUED | OUTPATIENT
Start: 2025-03-08 | End: 2025-03-12 | Stop reason: HOSPADM

## 2025-03-08 RX ADMIN — POTASSIUM CHLORIDE 40 MEQ: 1500 TABLET, EXTENDED RELEASE ORAL at 09:03

## 2025-03-08 RX ADMIN — MYCOPHENOLATE MOFETIL 500 MG: 500 TABLET, FILM COATED ORAL at 09:03

## 2025-03-08 RX ADMIN — CIPROFLOXACIN HYDROCHLORIDE 1 DROP: 3 SOLUTION/ DROPS OPHTHALMIC at 01:03

## 2025-03-08 RX ADMIN — CALCITRIOL CAPSULES 0.25 MCG 0.25 MCG: 0.25 CAPSULE ORAL at 09:03

## 2025-03-08 RX ADMIN — INSULIN GLARGINE 15 UNITS: 100 INJECTION, SOLUTION SUBCUTANEOUS at 09:03

## 2025-03-08 RX ADMIN — FERROUS SULFATE TAB 325 MG (65 MG ELEMENTAL FE) 1 EACH: 325 (65 FE) TAB at 09:03

## 2025-03-08 RX ADMIN — CIPROFLOXACIN HYDROCHLORIDE 1 DROP: 3 SOLUTION/ DROPS OPHTHALMIC at 10:03

## 2025-03-08 RX ADMIN — PREDNISONE 5 MG: 5 TABLET ORAL at 09:03

## 2025-03-08 RX ADMIN — METOPROLOL SUCCINATE 25 MG: 25 TABLET, EXTENDED RELEASE ORAL at 09:03

## 2025-03-08 RX ADMIN — Medication 16 G: at 06:03

## 2025-03-08 RX ADMIN — TACROLIMUS 4 MG: 1 CAPSULE ORAL at 05:03

## 2025-03-08 RX ADMIN — MAGNESIUM OXIDE TAB 400 MG (241.3 MG ELEMENTAL MG) 400 MG: 400 (241.3 MG) TAB at 09:03

## 2025-03-08 RX ADMIN — CIPROFLOXACIN HYDROCHLORIDE 1 DROP: 3 SOLUTION/ DROPS OPHTHALMIC at 06:03

## 2025-03-08 RX ADMIN — TACROLIMUS 4 MG: 1 CAPSULE ORAL at 09:03

## 2025-03-08 NOTE — PROGRESS NOTES
[facility] Medicine  Progress Note    Patient Name: Mitch Whittaker  MRN: [id number]  Patient Class: IP- Inpatient   Admission Date: 2/26/2025  Length of Stay: 10 days  Attending Physician: Lindsey Ferreira MD  Primary Care Provider: Valery Caal MD    Subjective     Principal Problem:ANGELITO (acute kidney injury)    HPI:  Patient is a 69-year-old  male with a PMH of CHF, kidney transplant, CAD, DM, DVT who presents to the ED with complaints of weakness.  Patient is a resident at [facility].  He states he was sent here for renal failure.  Patient does report still producing urine however it is decreased.  Does complain of some dysuria.  Reports that oral intake is good.  Denies any fever or chills.  No other issues reported at this time.    In the ED, H&H 8.0/27, K:  2.9, BUN/CR 60/4.3.  ED discussed with Dr. Gonsalez who recommended fluid challenge due to concern with over-diuresis.      Overview/Hospital Course:  02/27/2025  Will start empiric Rocephin for UTI.  Creatinine stable.  Continue IVF.  Nephrology on case.  Patient may need renal biopsy.  02/28/2025  Creatinine trending down.  Will continue IVF.  Gross hematuria improving.  Continue Rocephin for UTI.  3/1 creatinine continues to improve. Discontinue intravenous fluids per nephrology. Discontinue gan per nephrology, monitor for urinary retention. Discussed blood transfusion with nephrology and patient also agreeable. No transfusion reaction in the past.  3/2 urology consulted. hematuria improving. Denies abdominal pain. Complains of weakness and cough and leg swelling. Physical/occupational therapy consulted. Creatinine improving  3/3: creatinine continues to improve. Entresto and diuretics remains on hold. Episode of hematuria with clots overnight, but resolved this AM. Outpatient follow up with Urology for cystoscopy. Continue Rocephin for UTI  3/4: Creatine has returned to baseline. He has completed Rocephin for UTI.  Intermittent episodes of hematuria that began last night. Patient is urinating with difficulty. Per urology still recommends outpatient follow up. Awaiting decision for SNF versus HH.   3/5/25: patient and family are agreeable to SNF. Urine dark yellow today. Hemoglobin still below goal of 8g/dL. Will transfuse additional unit today and give Bumex post transfusion.   3/6/25: Awaiting placement at [facility]  3/7/25: facility is waiting on financial documents. Family will turn in this weekend or will discharge home with HH.  3/8/25: Financial documents faxed over. Awaiting placement    Interval History: doing well. Awaiting placement.    Review of Systems   Constitutional:  Positive for activity change and fatigue. Negative for appetite change and fever.   Respiratory:  Positive for cough.    Cardiovascular:  Positive for leg swelling.   Genitourinary:  Negative for difficulty urinating, dysuria and hematuria.   Musculoskeletal:  Positive for arthralgias.   Allergic/Immunologic: Positive for immunocompromised state.   Neurological:  Positive for weakness.   Psychiatric/Behavioral:  Negative for agitation, behavioral problems, confusion, decreased concentration and dysphoric mood.      Objective:     Vital Signs (Most Recent):  Temp: 97.3 °F (36.3 °C) (03/08/25 1608)  Pulse: (!) 119 (03/08/25 1608)  Resp: 18 (03/08/25 1608)  BP: 123/85 (03/08/25 1608)  SpO2: 99 % (03/08/25 1608) Vital Signs (24h Range):  Temp:  [97.3 °F (36.3 °C)-98.9 °F (37.2 °C)] 97.3 °F (36.3 °C)  Pulse:  [] 119  Resp:  [17-18] 18  SpO2:  [96 %-99 %] 99 %  BP: (115-165)/() 123/85     Weight: 104.6 kg (230 lb 9.6 oz)  Body mass index is 31.28 kg/m².  No intake or output data in the 24 hours ending 03/08/25 1646      Physical Exam  Vitals and nursing note reviewed.   Constitutional:       General: He is not in acute distress.     Appearance: He is ill-appearing. He is not toxic-appearing.   HENT:      Head: Normocephalic and  atraumatic.   Cardiovascular:      Rate and Rhythm: Normal rate.   Pulmonary:      Effort: Pulmonary effort is normal. No respiratory distress.   Abdominal:      Palpations: Abdomen is soft.      Tenderness: There is no abdominal tenderness.   Musculoskeletal:      Right lower leg: Edema present.      Left lower leg: Edema present.   Skin:     General: Skin is warm.   Neurological:      Mental Status: He is alert and oriented to person, place, and time.      Motor: Weakness present.           Significant Labs: All pertinent labs within the past 24 hours have been reviewed.  CBC:   Recent Labs   Lab 03/08/25  0635   WBC 3.27*   HGB 9.4*   HCT 33.1*        CMP:   Recent Labs   Lab 03/08/25  0635      K 4.2   *   CO2 20*   GLU 58*   BUN 28*   CREATININE 1.3   CALCIUM 9.1   ANIONGAP 10       Significant Imaging: I have reviewed all pertinent imaging results/findings within the past 24 hours.      Assessment & Plan  ANGELITO (acute kidney injury)  ANGELITO is likely due to pre-renal azotemia due to dehydration. Baseline creatinine is  1.4 . Most recent creatinine and eGFR are listed below.  Recent Labs     03/06/25  0553 03/08/25  0635   CREATININE 1.5* 1.3   EGFRNORACEVR 49* 59*      Plan  Hold diuretics   Monitor urine output   Urine sodium and creatinine   Consult Nephrology on case   Continue IVF  Patient may need renal biopsy    3/2/25  Creatinine improving   Discontinue intravenous fluids per nephrology   Nephrology on case    3/3/25  Continues to improve.   Hold Entresto and diuretics    3/4/25  Improving    3/7/25  stable  Class 3 severe obesity due to excess calories with body mass index (BMI) of 40.0 to 44.9 in adult  Body mass index is 31.28 kg/m². Morbid obesity complicates all aspects of disease management from diagnostic modalities to treatment. Weight loss encouraged and health benefits explained to patient.   Physical/occupational therapy     Chronic immunosuppression with Prograf, MMF and  "prednisone  Will hold CellCept for now Continue Prograf   Prograf levels 3.4  Continue prednisone    Type II diabetes mellitus with renal manifestations  Patient's FSGs are controlled on current medication regimen.  Last A1c reviewed-   Lab Results   Component Value Date    HGBA1C 5.7 (H) 12/17/2024     Most recent fingerstick glucose reviewed-   Recent Labs   Lab 03/07/25  2130 03/08/25  0617 03/08/25  0647 03/08/25  1308   POCTGLUCOSE 171* 55* 88 91     Current correctional scale  Medium  Maintain anti-hyperglycemic dose as follows-   Antihyperglycemics (From admission, onward)      Start     Stop Route Frequency Ordered    03/08/25 0900  insulin glargine U-100 (Lantus) pen 15 Units         -- SubQ 2 times daily 03/08/25 0742    02/26/25 1551  insulin aspart U-100 pen 0-10 Units         -- SubQ Before meals & nightly PRN 02/26/25 1451          Hold Oral hypoglycemics while patient is in the hospital.  Continue adjusting insulin needs as indicated     3/4/25   Glucose above goal  Optimize long acting 20 units BID    3/5/25  Glucose better controlled today  Chronic combined systolic and diastolic congestive heart failure  Patient has Combined Systolic and Diastolic heart failure that is Chronic. On presentation their CHF was well compensated. Most recent BNP and echo results are listed below.  No results for input(s): "BNP" in the last 72 hours.    Latest ECHO  Results for orders placed during the hospital encounter of 11/26/24    Echo    Interpretation Summary    Left Ventricle: The left ventricle is normal in size. Normal wall thickness. There is concentric hypertrophy. Normal wall motion. Septal motion is consistent with bundle branch block. There is moderately reduced systolic function. Ejection fraction is approximately 35%. Grade I diastolic dysfunction.    Right Ventricle: Normal right ventricular cavity size. Wall thickness is normal. Systolic function is normal.    IVC/SVC: Normal venous pressure at 3 " mmHg.    Current Heart Failure Medications  metoprolol succinate (TOPROL-XL) 24 hr tablet 25 mg, Daily, Oral  hydrALAZINE injection 10 mg, Every 6 hours PRN, Intravenous  bumetanide injection 1 mg, Once as needed, Intravenous    Plan  - Monitor strict I&Os and daily weights.    - Place on telemetry  - Low sodium diet  - Place on fluid restriction of 1.5 L.   - Cardiology has not been consulted  - The patient's volume status is at their baseline  - patient appears euvolemic    One time bumex after blood transfusion    3/6/25  Stable.    Deep vein thrombosis (DVT) of lower extremity  Due to hematuria   Will hold anticoagulation for now  Hb/hct in am       3/5/25  No further episodes of hematuria. Will likely restart anticoagulation after Urology visit  Chronic cough  Follows pulmonology outpatient  Former smoker  Complains of dry cough but no dyspnea  Schedule breathing treatments and monitor response    Gross hematuria  Possibly related to Gan insertion   Will continue to monitor   should hematuria persists will plan to consult Urology on case  Hematuria improving  Discontinue gan  Agreeable to blood transfusion  Urology note reviewed  Continue intravenous antibiotic(s)   Will need outpatient urology for cystoscopy     3/4/25  Intermittent episodes of hematuria.   No indication for further workup IP if no urinary retention per Urology.   Will follow up outpatient    3/5/25  No episodes of hematuria today  Will plan for Urology OP visit once discharged      3/7/25  Appt scheduled for Dr. Camilo on 3/17/25 at 1100 on [place]. He will hold anticoagulation until then  UTI (urinary tract infection)  Continue Rocephin.   Completed  3//3/25      Anemia, chronic disease  Anemia is likely due to acute blood loss which was from hematuria and chronic disease due to Chronic Kidney Disease. Most recent hemoglobin and hematocrit are listed below.  Recent Labs     03/06/25  0553 03/08/25  0635   HGB 8.9* 9.4*   HCT 31.3* 33.1*      Plan  - Monitor serial CBC: Daily  - Transfuse PRBC if patient becomes hemodynamically unstable, symptomatic or H/H drops below 7/21.  - Patient has received 1 units of PRBCs on 3/1/25  - Patient's anemia is currently stable  - cbc in am    3/5/25  No more episodes of hematuria. Will give additional unit of blood today to reach goal of 8g/dL    3/7/25  Stable    VTE Risk Mitigation (From admission, onward)           Ordered     Place sequential compression device  Until discontinued         02/26/25 1607                    Discharge Planning   GRETEL:      Code Status: Full Code   Medical Readiness for Discharge Date:   Discharge Plan A: Skilled Nursing Facility            Darya Maravilla NP  Department of Hospital Medicine   [facility] - Med Surg

## 2025-03-08 NOTE — SUBJECTIVE & OBJECTIVE
Interval History: doing well. Awaiting placement.    Review of Systems   Constitutional:  Positive for activity change and fatigue. Negative for appetite change and fever.   Respiratory:  Positive for cough.    Cardiovascular:  Positive for leg swelling.   Genitourinary:  Negative for difficulty urinating, dysuria and hematuria.   Musculoskeletal:  Positive for arthralgias.   Allergic/Immunologic: Positive for immunocompromised state.   Neurological:  Positive for weakness.   Psychiatric/Behavioral:  Negative for agitation, behavioral problems, confusion, decreased concentration and dysphoric mood.      Objective:     Vital Signs (Most Recent):  Temp: 97.3 °F (36.3 °C) (03/08/25 1608)  Pulse: (!) 119 (03/08/25 1608)  Resp: 18 (03/08/25 1608)  BP: 123/85 (03/08/25 1608)  SpO2: 99 % (03/08/25 1608) Vital Signs (24h Range):  Temp:  [97.3 °F (36.3 °C)-98.9 °F (37.2 °C)] 97.3 °F (36.3 °C)  Pulse:  [] 119  Resp:  [17-18] 18  SpO2:  [96 %-99 %] 99 %  BP: (115-165)/() 123/85     Weight: 104.6 kg (230 lb 9.6 oz)  Body mass index is 31.28 kg/m².  No intake or output data in the 24 hours ending 03/08/25 1646      Physical Exam  Vitals and nursing note reviewed.   Constitutional:       General: He is not in acute distress.     Appearance: He is ill-appearing. He is not toxic-appearing.   HENT:      Head: Normocephalic and atraumatic.   Cardiovascular:      Rate and Rhythm: Normal rate.   Pulmonary:      Effort: Pulmonary effort is normal. No respiratory distress.   Abdominal:      Palpations: Abdomen is soft.      Tenderness: There is no abdominal tenderness.   Musculoskeletal:      Right lower leg: Edema present.      Left lower leg: Edema present.   Skin:     General: Skin is warm.   Neurological:      Mental Status: He is alert and oriented to person, place, and time.      Motor: Weakness present.           Significant Labs: All pertinent labs within the past 24 hours have been reviewed.  CBC:   Recent Labs   Lab  03/08/25  0635   WBC 3.27*   HGB 9.4*   HCT 33.1*        CMP:   Recent Labs   Lab 03/08/25  0635      K 4.2   *   CO2 20*   GLU 58*   BUN 28*   CREATININE 1.3   CALCIUM 9.1   ANIONGAP 10       Significant Imaging: I have reviewed all pertinent imaging results/findings within the past 24 hours.

## 2025-03-08 NOTE — ASSESSMENT & PLAN NOTE
Patient's FSGs are controlled on current medication regimen.  Last A1c reviewed-   Lab Results   Component Value Date    HGBA1C 5.7 (H) 12/17/2024     Most recent fingerstick glucose reviewed-   Recent Labs   Lab 03/07/25  2130 03/08/25  0617 03/08/25  0647 03/08/25  1308   POCTGLUCOSE 171* 55* 88 91     Current correctional scale  Medium  Maintain anti-hyperglycemic dose as follows-   Antihyperglycemics (From admission, onward)      Start     Stop Route Frequency Ordered    03/08/25 0900  insulin glargine U-100 (Lantus) pen 15 Units         -- SubQ 2 times daily 03/08/25 0742    02/26/25 1551  insulin aspart U-100 pen 0-10 Units         -- SubQ Before meals & nightly PRN 02/26/25 1451          Hold Oral hypoglycemics while patient is in the hospital.  Continue adjusting insulin needs as indicated     3/4/25   Glucose above goal  Optimize long acting 20 units BID    3/5/25  Glucose better controlled today

## 2025-03-08 NOTE — PLAN OF CARE
Discussed poc with pt, pt verbalized understanding    Purposeful rounding every 2hours    VS wnl  Cardiac monitoring in use, pt is NSR, tele monitor # [id number]  Blood glucose monitoring, supplemental insulin given as ordered  Fall precautions in place, remains injury free  Pt denies c/o pain or nausea    Accurate I&Os  Bed locked at lowest position  Call light within reach    Chart check complete  Will cont with POC

## 2025-03-08 NOTE — ASSESSMENT & PLAN NOTE
Anemia is likely due to acute blood loss which was from hematuria and chronic disease due to Chronic Kidney Disease. Most recent hemoglobin and hematocrit are listed below.  Recent Labs     03/06/25  0553 03/08/25  0635   HGB 8.9* 9.4*   HCT 31.3* 33.1*     Plan  - Monitor serial CBC: Daily  - Transfuse PRBC if patient becomes hemodynamically unstable, symptomatic or H/H drops below 7/21.  - Patient has received 1 units of PRBCs on 3/1/25  - Patient's anemia is currently stable  - cbc in am    3/5/25  No more episodes of hematuria. Will give additional unit of blood today to reach goal of 8g/dL    3/7/25  Stable

## 2025-03-08 NOTE — PLAN OF CARE
Discussed poc with pt, pt verbalized understanding    Purposeful rounding every 2hours  Pt had multiple loose, green stools    Cardiac monitoring in use, pt is tachycardic, tele monitor # [id number]  Blood glucose monitoring   Fall precautions in place, remains injury free    Accurate I&Os  Bed locked at lowest position  Call light within reach    Chart check complete  Will cont with POC

## 2025-03-08 NOTE — ASSESSMENT & PLAN NOTE
ANGELITO is likely due to pre-renal azotemia due to dehydration. Baseline creatinine is 1.4. Most recent creatinine and eGFR are listed below.  Recent Labs     03/06/25  0553 03/08/25  0635   CREATININE 1.5* 1.3   EGFRNORACEVR 49* 59*      Plan  Hold diuretics   Monitor urine output   Urine sodium and creatinine   Consult Nephrology on case   Continue IVF  Patient may need renal biopsy    3/2/25  Creatinine improving   Discontinue intravenous fluids per nephrology   Nephrology on case    3/3/25  Continues to improve.   Hold Entresto and diuretics    3/4/25  Improving    3/7/25  stable

## 2025-03-08 NOTE — ASSESSMENT & PLAN NOTE
Possibly related to Gan insertion   Will continue to monitor   should hematuria persists will plan to consult Urology on case  Hematuria improving  Discontinue gan  Agreeable to blood transfusion  Urology note reviewed  Continue intravenous antibiotic(s)   Will need outpatient urology for cystoscopy     3/4/25  Intermittent episodes of hematuria.   No indication for further workup IP if no urinary retention per Urology.   Will follow up outpatient    3/5/25  No episodes of hematuria today  Will plan for Urology OP visit once discharged      3/7/25  Appt scheduled for Dr. Camilo on 3/17/25 at 1100 on [place]. He will hold anticoagulation until then

## 2025-03-09 PROBLEM — A04.72 C. DIFFICILE COLITIS: Status: ACTIVE | Noted: 2025-03-09

## 2025-03-09 LAB
ACANTHOCYTES BLD QL SMEAR: PRESENT
ALBUMIN SERPL BCP-MCNC: 2 G/DL (ref 3.5–5.2)
ANION GAP SERPL CALC-SCNC: 8 MMOL/L (ref 8–16)
ANISOCYTOSIS BLD QL SMEAR: SLIGHT
BASOPHILS # BLD AUTO: ABNORMAL K/UL (ref 0–0.2)
BASOPHILS NFR BLD: 0 % (ref 0–1.9)
BUN SERPL-MCNC: 26 MG/DL (ref 8–23)
C DIFF GDH STL QL: POSITIVE
C DIFF TOX A+B STL QL IA: POSITIVE
CALCIUM SERPL-MCNC: 9 MG/DL (ref 8.7–10.5)
CHLORIDE SERPL-SCNC: 114 MMOL/L (ref 95–110)
CO2 SERPL-SCNC: 21 MMOL/L (ref 23–29)
CREAT SERPL-MCNC: 1.5 MG/DL (ref 0.5–1.4)
DIFFERENTIAL METHOD BLD: ABNORMAL
EOSINOPHIL # BLD AUTO: ABNORMAL K/UL (ref 0–0.5)
EOSINOPHIL NFR BLD: 3 % (ref 0–8)
ERYTHROCYTE [DISTWIDTH] IN BLOOD BY AUTOMATED COUNT: 15.8 % (ref 11.5–14.5)
EST. GFR  (NO RACE VARIABLE): 49 ML/MIN/1.73 M^2
GIANT PLATELETS BLD QL SMEAR: PRESENT
GLUCOSE SERPL-MCNC: 119 MG/DL (ref 70–110)
HCT VFR BLD AUTO: 32.7 % (ref 40–54)
HGB BLD-MCNC: 9.1 G/DL (ref 14–18)
IMM GRANULOCYTES # BLD AUTO: ABNORMAL K/UL (ref 0–0.04)
IMM GRANULOCYTES NFR BLD AUTO: ABNORMAL % (ref 0–0.5)
LYMPHOCYTES # BLD AUTO: ABNORMAL K/UL (ref 1–4.8)
LYMPHOCYTES NFR BLD: 24 % (ref 18–48)
MAGNESIUM SERPL-MCNC: 1.5 MG/DL (ref 1.6–2.6)
MCH RBC QN AUTO: 24.1 PG (ref 27–31)
MCHC RBC AUTO-ENTMCNC: 27.8 G/DL (ref 32–36)
MCV RBC AUTO: 87 FL (ref 82–98)
MONOCYTES # BLD AUTO: ABNORMAL K/UL (ref 0.3–1)
MONOCYTES NFR BLD: 11 % (ref 4–15)
NEUTROPHILS NFR BLD: 62 % (ref 38–73)
NRBC BLD-RTO: 0 /100 WBC
OVALOCYTES BLD QL SMEAR: ABNORMAL
PHOSPHATE SERPL-MCNC: 3.1 MG/DL (ref 2.7–4.5)
PLATELET # BLD AUTO: 248 K/UL (ref 150–450)
PLATELET BLD QL SMEAR: ABNORMAL
PMV BLD AUTO: 10.6 FL (ref 9.2–12.9)
POCT GLUCOSE: 110 MG/DL (ref 70–110)
POCT GLUCOSE: 127 MG/DL (ref 70–110)
POCT GLUCOSE: 162 MG/DL (ref 70–110)
POCT GLUCOSE: 51 MG/DL (ref 70–110)
POCT GLUCOSE: 59 MG/DL (ref 70–110)
POCT GLUCOSE: 84 MG/DL (ref 70–110)
POIKILOCYTOSIS BLD QL SMEAR: SLIGHT
POTASSIUM SERPL-SCNC: 5.1 MMOL/L (ref 3.5–5.1)
RBC # BLD AUTO: 3.78 M/UL (ref 4.6–6.2)
SCHISTOCYTES BLD QL SMEAR: PRESENT
SODIUM SERPL-SCNC: 143 MMOL/L (ref 136–145)
WBC # BLD AUTO: 2.75 K/UL (ref 3.9–12.7)

## 2025-03-09 PROCEDURE — 63600175 PHARM REV CODE 636 W HCPCS: Performed by: NURSE PRACTITIONER

## 2025-03-09 PROCEDURE — 25000003 PHARM REV CODE 250: Performed by: NURSE PRACTITIONER

## 2025-03-09 PROCEDURE — 83735 ASSAY OF MAGNESIUM: CPT | Performed by: INTERNAL MEDICINE

## 2025-03-09 PROCEDURE — 27000207 HC ISOLATION

## 2025-03-09 PROCEDURE — 25000003 PHARM REV CODE 250: Performed by: INTERNAL MEDICINE

## 2025-03-09 PROCEDURE — 21400001 HC TELEMETRY ROOM

## 2025-03-09 PROCEDURE — 25000003 PHARM REV CODE 250: Performed by: FAMILY MEDICINE

## 2025-03-09 PROCEDURE — 63600175 PHARM REV CODE 636 W HCPCS: Performed by: INTERNAL MEDICINE

## 2025-03-09 PROCEDURE — 99222 1ST HOSP IP/OBS MODERATE 55: CPT | Mod: ,,, | Performed by: INTERNAL MEDICINE

## 2025-03-09 PROCEDURE — 36415 COLL VENOUS BLD VENIPUNCTURE: CPT | Performed by: INTERNAL MEDICINE

## 2025-03-09 PROCEDURE — 85027 COMPLETE CBC AUTOMATED: CPT | Performed by: INTERNAL MEDICINE

## 2025-03-09 PROCEDURE — 80069 RENAL FUNCTION PANEL: CPT | Performed by: INTERNAL MEDICINE

## 2025-03-09 PROCEDURE — 63600175 PHARM REV CODE 636 W HCPCS: Performed by: FAMILY MEDICINE

## 2025-03-09 PROCEDURE — 87449 NOS EACH ORGANISM AG IA: CPT | Performed by: FAMILY MEDICINE

## 2025-03-09 PROCEDURE — 85007 BL SMEAR W/DIFF WBC COUNT: CPT | Performed by: INTERNAL MEDICINE

## 2025-03-09 RX ORDER — KETOCONAZOLE 200 MG/1
200 TABLET ORAL DAILY
Status: DISCONTINUED | OUTPATIENT
Start: 2025-03-09 | End: 2025-03-12 | Stop reason: HOSPADM

## 2025-03-09 RX ORDER — MYCOPHENOLATE MOFETIL 500 MG/1
500 TABLET ORAL NIGHTLY
Status: DISCONTINUED | OUTPATIENT
Start: 2025-03-10 | End: 2025-03-09

## 2025-03-09 RX ORDER — CHOLESTYRAMINE 4 G/9G
1 POWDER, FOR SUSPENSION ORAL 2 TIMES DAILY
Status: DISCONTINUED | OUTPATIENT
Start: 2025-03-09 | End: 2025-03-12 | Stop reason: HOSPADM

## 2025-03-09 RX ADMIN — TACROLIMUS 4 MG: 1 CAPSULE ORAL at 09:03

## 2025-03-09 RX ADMIN — Medication 16 G: at 06:03

## 2025-03-09 RX ADMIN — INSULIN GLARGINE 15 UNITS: 100 INJECTION, SOLUTION SUBCUTANEOUS at 09:03

## 2025-03-09 RX ADMIN — METOPROLOL SUCCINATE 25 MG: 25 TABLET, EXTENDED RELEASE ORAL at 09:03

## 2025-03-09 RX ADMIN — FERROUS SULFATE TAB 325 MG (65 MG ELEMENTAL FE) 1 EACH: 325 (65 FE) TAB at 09:03

## 2025-03-09 RX ADMIN — CALCITRIOL CAPSULES 0.25 MCG 0.25 MCG: 0.25 CAPSULE ORAL at 09:03

## 2025-03-09 RX ADMIN — CHOLESTYRAMINE 4 G: 4 POWDER, FOR SUSPENSION ORAL at 08:03

## 2025-03-09 RX ADMIN — TACROLIMUS 4 MG: 1 CAPSULE ORAL at 05:03

## 2025-03-09 RX ADMIN — PREDNISONE 5 MG: 5 TABLET ORAL at 09:03

## 2025-03-09 RX ADMIN — POTASSIUM CHLORIDE 40 MEQ: 1500 TABLET, EXTENDED RELEASE ORAL at 09:03

## 2025-03-09 RX ADMIN — MAGNESIUM OXIDE TAB 400 MG (241.3 MG ELEMENTAL MG) 400 MG: 400 (241.3 MG) TAB at 09:03

## 2025-03-09 RX ADMIN — GUAIFENESIN AND DEXTROMETHORPHAN 5 ML: 100; 10 SYRUP ORAL at 12:03

## 2025-03-09 RX ADMIN — VANCOMYCIN HYDROCHLORIDE 125 MG: KIT at 05:03

## 2025-03-09 RX ADMIN — CIPROFLOXACIN HYDROCHLORIDE 1 DROP: 3 SOLUTION/ DROPS OPHTHALMIC at 06:03

## 2025-03-09 RX ADMIN — POTASSIUM CHLORIDE 40 MEQ: 1500 TABLET, EXTENDED RELEASE ORAL at 08:03

## 2025-03-09 RX ADMIN — KETOCONAZOLE 200 MG: 200 TABLET ORAL at 05:03

## 2025-03-09 RX ADMIN — VANCOMYCIN HYDROCHLORIDE 125 MG: KIT at 11:03

## 2025-03-09 RX ADMIN — MYCOPHENOLATE MOFETIL 500 MG: 500 TABLET, FILM COATED ORAL at 09:03

## 2025-03-09 NOTE — PROGRESS NOTES
[facility] Medicine  Progress Note    Patient Name: Mitch Whittaker  MRN: [id number]  Patient Class: IP- Inpatient   Admission Date: 2/26/2025  Length of Stay: 11 days  Attending Physician: Lindsey Ferreira MD  Primary Care Provider: Valery Caal MD    Subjective     Principal Problem:ANGELITO (acute kidney injury)    HPI:  Patient is a 69-year-old  male with a PMH of CHF, kidney transplant, CAD, DM, DVT who presents to the ED with complaints of weakness.  Patient is a resident at [facility].  He states he was sent here for renal failure.  Patient does report still producing urine however it is decreased.  Does complain of some dysuria.  Reports that oral intake is good.  Denies any fever or chills.  No other issues reported at this time.    In the ED, H&H 8.0/27, K:  2.9, BUN/CR 60/4.3.  ED discussed with Dr. Gonsalez who recommended fluid challenge due to concern with over-diuresis.        Overview/Hospital Course:  02/27/2025  Will start empiric Rocephin for UTI.  Creatinine stable.  Continue IVF.  Nephrology on case.  Patient may need renal biopsy.  02/28/2025  Creatinine trending down.  Will continue IVF.  Gross hematuria improving.  Continue Rocephin for UTI.  3/1 creatinine continues to improve. Discontinue intravenous fluids per nephrology. Discontinue gan per nephrology, monitor for urinary retention. Discussed blood transfusion with nephrology and patient also agreeable. No transfusion reaction in the past.  3/2 urology consulted. hematuria improving. Denies abdominal pain. Complains of weakness and cough and leg swelling. Physical/occupational therapy consulted. Creatinine improving  3/3: creatinine continues to improve. Entresto and diuretics remains on hold. Episode of hematuria with clots overnight, but resolved this AM. Outpatient follow up with Urology for cystoscopy. Continue Rocephin for UTI  3/4: Creatine has returned to baseline. He has completed Rocephin for  UTI. Intermittent episodes of hematuria that began last night. Patient is urinating with difficulty. Per urology still recommends outpatient follow up. Awaiting decision for SNF versus HH.   3/5/25: patient and family are agreeable to SNF. Urine dark yellow today. Hemoglobin still below goal of 8g/dL. Will transfuse additional unit today and give Bumex post transfusion.   3/6/25: Awaiting placement at [facility]  3/7/25: facility is waiting on financial documents. Family will turn in this weekend or will discharge home with HH.  3/8/25: Financial documents faxed over. Awaiting placement.   3/9/25: complaints of diarrhea over the weekend. C. Diff positive. Oral Vancomycin started. Hold Cellcept    Interval History: c/o several episodes of diarrhea today. C. Diff positive. Denies abdominal pain.     Review of Systems   Constitutional:  Positive for activity change and fatigue. Negative for appetite change and fever.   Respiratory:  Positive for cough.    Cardiovascular:  Positive for leg swelling.   Gastrointestinal:  Positive for diarrhea.   Genitourinary:  Negative for difficulty urinating, dysuria and hematuria.   Musculoskeletal:  Positive for arthralgias.   Allergic/Immunologic: Positive for immunocompromised state.   Neurological:  Positive for weakness.   Psychiatric/Behavioral:  Negative for agitation, behavioral problems, confusion, decreased concentration and dysphoric mood.        Objective:     Vital Signs (Most Recent):  Temp: 97.5 °F (36.4 °C) (03/09/25 1048)  Pulse: 82 (03/09/25 1620)  Resp: 19 (03/09/25 1048)  BP: 109/69 (03/09/25 1048)  SpO2: 96 % (03/09/25 1048) Vital Signs (24h Range):  Temp:  [97.5 °F (36.4 °C)-98.7 °F (37.1 °C)] 97.5 °F (36.4 °C)  Pulse:  [72-96] 82  Resp:  [18-19] 19  SpO2:  [94 %-99 %] 96 %  BP: ()/(52-69) 109/69     Weight: 104.6 kg (230 lb 9.6 oz)  Body mass index is 31.28 kg/m².    Intake/Output Summary (Last 24 hours) at 3/9/2025 5509  Last data filed at 3/9/2025  1730  Gross per 24 hour   Intake 185.63 ml   Output 125 ml   Net 60.63 ml         Physical Exam  Vitals and nursing note reviewed.   Constitutional:       General: He is not in acute distress.     Appearance: He is ill-appearing. He is not toxic-appearing.   HENT:      Head: Normocephalic and atraumatic.   Cardiovascular:      Rate and Rhythm: Normal rate.   Pulmonary:      Effort: Pulmonary effort is normal. No respiratory distress.   Abdominal:      Palpations: Abdomen is soft.      Tenderness: There is no abdominal tenderness.   Musculoskeletal:      Right lower leg: Edema present.      Left lower leg: Edema present.   Skin:     General: Skin is warm.   Neurological:      Mental Status: He is alert and oriented to person, place, and time.      Motor: Weakness present.           Significant Labs: All pertinent labs within the past 24 hours have been reviewed.  CBC:   Recent Labs   Lab 03/08/25  0635 03/09/25  1612   WBC 3.27* 2.75*   HGB 9.4* 9.1*   HCT 33.1* 32.7*    248     CMP:   Recent Labs   Lab 03/08/25  0635 03/09/25  1612    143   K 4.2 5.1   * 114*   CO2 20* 21*   GLU 58* 119*   BUN 28* 26*   CREATININE 1.3 1.5*   CALCIUM 9.1 9.0   ALBUMIN  --  2.0*   ANIONGAP 10 8       Significant Imaging: I have reviewed all pertinent imaging results/findings within the past 24 hours.      Assessment & Plan  ANGELITO (acute kidney injury)  ANGELITO is likely due to pre-renal azotemia due to dehydration. Baseline creatinine is  1.4 . Most recent creatinine and eGFR are listed below.  Recent Labs     03/08/25  0635 03/09/25  1612   CREATININE 1.3 1.5*   EGFRNORACEVR 59* 49*      Plan  Hold diuretics   Monitor urine output   Urine sodium and creatinine   Consult Nephrology on case   Continue IVF  Patient may need renal biopsy    3/2/25  Creatinine improving   Discontinue intravenous fluids per nephrology   Nephrology on case    3/3/25  Continues to improve.   Hold Entresto and  "diuretics    3/4/25  Improving    3/7/25  stable  Class 3 severe obesity due to excess calories with body mass index (BMI) of 40.0 to 44.9 in adult  Body mass index is 31.28 kg/m². Morbid obesity complicates all aspects of disease management from diagnostic modalities to treatment. Weight loss encouraged and health benefits explained to patient.   Physical/occupational therapy     Chronic immunosuppression with Prograf, MMF and prednisone  Will hold CellCept for now Continue Prograf   Prograf levels 3.4  Continue prednisone    3/9/25  Hold Cellcept due to C. Diff    Type II diabetes mellitus with renal manifestations  Patient's FSGs are controlled on current medication regimen.  Last A1c reviewed-   Lab Results   Component Value Date    HGBA1C 5.7 (H) 12/17/2024     Most recent fingerstick glucose reviewed-   Recent Labs   Lab 03/09/25  0659 03/09/25  0807 03/09/25  1218 03/09/25  1709   POCTGLUCOSE 59* 110 84 127*     Current correctional scale  Medium  Maintain anti-hyperglycemic dose as follows-   Antihyperglycemics (From admission, onward)      Start     Stop Route Frequency Ordered    03/08/25 0900  insulin glargine U-100 (Lantus) pen 15 Units         -- SubQ 2 times daily 03/08/25 0742    02/26/25 1551  insulin aspart U-100 pen 0-10 Units         -- SubQ Before meals & nightly PRN 02/26/25 1451          Hold Oral hypoglycemics while patient is in the hospital.  Continue adjusting insulin needs as indicated     3/4/25   Glucose above goal  Optimize long acting 20 units BID    3/5/25  Glucose better controlled today  Chronic combined systolic and diastolic congestive heart failure  Patient has Combined Systolic and Diastolic heart failure that is Chronic. On presentation their CHF was well compensated. Most recent BNP and echo results are listed below.  No results for input(s): "BNP" in the last 72 hours.    Latest ECHO  Results for orders placed during the hospital encounter of " 11/26/24    Echo    Interpretation Summary    Left Ventricle: The left ventricle is normal in size. Normal wall thickness. There is concentric hypertrophy. Normal wall motion. Septal motion is consistent with bundle branch block. There is moderately reduced systolic function. Ejection fraction is approximately 35%. Grade I diastolic dysfunction.    Right Ventricle: Normal right ventricular cavity size. Wall thickness is normal. Systolic function is normal.    IVC/SVC: Normal venous pressure at 3 mmHg.    Current Heart Failure Medications  metoprolol succinate (TOPROL-XL) 24 hr tablet 25 mg, Daily, Oral  hydrALAZINE injection 10 mg, Every 6 hours PRN, Intravenous  bumetanide injection 1 mg, Once as needed, Intravenous    Plan  - Monitor strict I&Os and daily weights.    - Place on telemetry  - Low sodium diet  - Place on fluid restriction of 1.5 L.   - Cardiology has not been consulted  - The patient's volume status is at their baseline  - patient appears euvolemic    One time bumex after blood transfusion    3/6/25  Stable.    Deep vein thrombosis (DVT) of lower extremity  Due to hematuria   Will hold anticoagulation for now  Hb/hct in am       3/5/25  No further episodes of hematuria. Will likely restart anticoagulation after Urology visit  Chronic cough  Follows pulmonology outpatient  Former smoker  Complains of dry cough but no dyspnea  Schedule breathing treatments and monitor response    Gross hematuria  Possibly related to Gan insertion   Will continue to monitor   should hematuria persists will plan to consult Urology on case  Hematuria improving  Discontinue gan  Agreeable to blood transfusion  Urology note reviewed  Continue intravenous antibiotic(s)   Will need outpatient urology for cystoscopy     3/4/25  Intermittent episodes of hematuria.   No indication for further workup IP if no urinary retention per Urology.   Will follow up outpatient    3/5/25  No episodes of hematuria today  Will plan for  Urology OP visit once discharged      3/7/25  Appt scheduled for Dr. Camilo on 3/17/25 at 1100 on [place]. He will hold anticoagulation until then  UTI (urinary tract infection)  Continue Rocephin.   Completed  3//3/25      Anemia, chronic disease  Anemia is likely due to acute blood loss which was from hematuria and chronic disease due to Chronic Kidney Disease. Most recent hemoglobin and hematocrit are listed below.  Recent Labs     03/08/25  0635 03/09/25  1612   HGB 9.4* 9.1*   HCT 33.1* 32.7*     Plan  - Monitor serial CBC: Daily  - Transfuse PRBC if patient becomes hemodynamically unstable, symptomatic or H/H drops below 7/21.  - Patient has received 1 units of PRBCs on 3/1/25  - Patient's anemia is currently stable  - cbc in am    3/5/25  No more episodes of hematuria. Will give additional unit of blood today to reach goal of 8g/dL    3/7/25  Stable    C. difficile colitis  Hold Cellcept per Nephrology recommendations  --Oral Vancomycin  --Questrain  --ID consulted  VTE Risk Mitigation (From admission, onward)           Ordered     Place sequential compression device  Until discontinued         02/26/25 1607                    Discharge Planning   GRETEL:      Code Status: Full Code   Medical Readiness for Discharge Date:   Discharge Plan A: Skilled Nursing Facility            Darya Maravilla NP  Department of Hospital Medicine   [facility] - Med Surg

## 2025-03-09 NOTE — SUBJECTIVE & OBJECTIVE
Interval History: c/o several episodes of diarrhea today. C. Diff positive. Denies abdominal pain.     Review of Systems   Constitutional:  Positive for activity change and fatigue. Negative for appetite change and fever.   Respiratory:  Positive for cough.    Cardiovascular:  Positive for leg swelling.   Gastrointestinal:  Positive for diarrhea.   Genitourinary:  Negative for difficulty urinating, dysuria and hematuria.   Musculoskeletal:  Positive for arthralgias.   Allergic/Immunologic: Positive for immunocompromised state.   Neurological:  Positive for weakness.   Psychiatric/Behavioral:  Negative for agitation, behavioral problems, confusion, decreased concentration and dysphoric mood.        Objective:     Vital Signs (Most Recent):  Temp: 97.5 °F (36.4 °C) (03/09/25 1048)  Pulse: 82 (03/09/25 1620)  Resp: 19 (03/09/25 1048)  BP: 109/69 (03/09/25 1048)  SpO2: 96 % (03/09/25 1048) Vital Signs (24h Range):  Temp:  [97.5 °F (36.4 °C)-98.7 °F (37.1 °C)] 97.5 °F (36.4 °C)  Pulse:  [72-96] 82  Resp:  [18-19] 19  SpO2:  [94 %-99 %] 96 %  BP: ()/(52-69) 109/69     Weight: 104.6 kg (230 lb 9.6 oz)  Body mass index is 31.28 kg/m².    Intake/Output Summary (Last 24 hours) at 3/9/2025 1736  Last data filed at 3/9/2025 1730  Gross per 24 hour   Intake 185.63 ml   Output 125 ml   Net 60.63 ml         Physical Exam  Vitals and nursing note reviewed.   Constitutional:       General: He is not in acute distress.     Appearance: He is ill-appearing. He is not toxic-appearing.   HENT:      Head: Normocephalic and atraumatic.   Cardiovascular:      Rate and Rhythm: Normal rate.   Pulmonary:      Effort: Pulmonary effort is normal. No respiratory distress.   Abdominal:      Palpations: Abdomen is soft.      Tenderness: There is no abdominal tenderness.   Musculoskeletal:      Right lower leg: Edema present.      Left lower leg: Edema present.   Skin:     General: Skin is warm.   Neurological:      Mental Status: He is alert  and oriented to person, place, and time.      Motor: Weakness present.           Significant Labs: All pertinent labs within the past 24 hours have been reviewed.  CBC:   Recent Labs   Lab 03/08/25  0635 03/09/25  1612   WBC 3.27* 2.75*   HGB 9.4* 9.1*   HCT 33.1* 32.7*    248     CMP:   Recent Labs   Lab 03/08/25  0635 03/09/25  1612    143   K 4.2 5.1   * 114*   CO2 20* 21*   GLU 58* 119*   BUN 28* 26*   CREATININE 1.3 1.5*   CALCIUM 9.1 9.0   ALBUMIN  --  2.0*   ANIONGAP 10 8       Significant Imaging: I have reviewed all pertinent imaging results/findings within the past 24 hours.

## 2025-03-09 NOTE — PLAN OF CARE
Problem: Adult Inpatient Plan of Care  Goal: Plan of Care Review  Outcome: Progressing  Goal: Patient-Specific Goal (Individualized)  Outcome: Progressing  Goal: Absence of Hospital-Acquired Illness or Injury  Outcome: Progressing  Goal: Optimal Comfort and Wellbeing  Outcome: Progressing  Goal: Readiness for Transition of Care  Outcome: Progressing     Problem: Bariatric Environmental Safety  Goal: Safety Maintained with Care  Outcome: Progressing     Problem: Diabetes Comorbidity  Goal: Blood Glucose Level Within Targeted Range  Outcome: Progressing     Problem: Acute Kidney Injury/Impairment  Goal: Fluid and Electrolyte Balance  Outcome: Progressing  Goal: Improved Oral Intake  Outcome: Progressing  Goal: Effective Renal Function  Outcome: Progressing     Problem: Skin Injury Risk Increased  Goal: Skin Health and Integrity  Outcome: Progressing     Problem: Infection  Goal: Absence of Infection Signs and Symptoms  Outcome: Progressing     Problem: Fall Injury Risk  Goal: Absence of Fall and Fall-Related Injury  Outcome: Progressing     Problem: Wound  Goal: Optimal Coping  Outcome: Progressing  Goal: Optimal Functional Ability  Outcome: Progressing  Goal: Absence of Infection Signs and Symptoms  Outcome: Progressing  Goal: Improved Oral Intake  Outcome: Progressing  Goal: Optimal Pain Control and Function  Outcome: Progressing  Goal: Skin Health and Integrity  Outcome: Progressing  Goal: Optimal Wound Healing  Outcome: Progressing

## 2025-03-09 NOTE — ASSESSMENT & PLAN NOTE
Anemia is likely due to acute blood loss which was from hematuria and chronic disease due to Chronic Kidney Disease. Most recent hemoglobin and hematocrit are listed below.  Recent Labs     03/08/25  0635 03/09/25  1612   HGB 9.4* 9.1*   HCT 33.1* 32.7*     Plan  - Monitor serial CBC: Daily  - Transfuse PRBC if patient becomes hemodynamically unstable, symptomatic or H/H drops below 7/21.  - Patient has received 1 units of PRBCs on 3/1/25  - Patient's anemia is currently stable  - cbc in am    3/5/25  No more episodes of hematuria. Will give additional unit of blood today to reach goal of 8g/dL    3/7/25  Stable

## 2025-03-09 NOTE — CONSULTS
[facility]  Nephrology  Consult Note    Patient Name: Mitch Whittaker  MRN: [id number]  Admission Date: 2/26/2025  Hospital Length of Stay: 11 days  Attending Provider: Lindsey Ferreira MD   Primary Care Physician: Valery Caal MD  Principal Problem:ANGELITO (acute kidney injury)  Kidney Transplant status and IS in setting of infection   Primary Nephrologist: Dr. Gonsalez     Inpatient consult to Nephrology  Consult performed by: Leon Jones MD  Consult ordered by: Darya Maravilla NP  Reason for consult: Kidney Transplant status and IS in setting of infection        Subjective:     HPI:   68 yo male s/p kidney transplant in 2015 (dtr was donor) admitted for ANGELITO on CKD with baseline creatinine around 1.5mg/dL. ANGELITO improved to baseline since admission but Nephrology was re-consulted for use of Immunosuppression in setting of newly diagnosed C diff. He was started on po vancomycin.  No new labs today.     He notes frequent stooling for about a week. Today he noted blood in the stool.     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 RNA NEGATIVE 5/2015, 2021, 2022    History of colon polyps     HPTH (hyperparathyroidism)     Hyperlipidemia     Hypertension associated with  stage 3 chronic kidney disease due to type 2 diabetes mellitus     LBBB (left bundle branch block) 12/20/2021    Morbid obesity with BMI of 45.0-49.9, adult     Nephrolithiasis 6/7/2013    PCO (posterior capsular opacification), left 03/04/2019    Proteinuria     resolved s/p transplant    S/P kidney transplant     Sleep apnea     Type 2 diabetes, uncontrolled, with retinopathy     Type II diabetes mellitus with renal manifestations        Past Surgical History:   Procedure Laterality Date    CARDIAC CATHETERIZATION  01/01/2008    normal coronary    CARPAL TUNNEL RELEASE Right 12/01/2023    Procedure: RELEASE, CARPAL TUNNEL;  Surgeon: Noel Almonte MD;  Location: [facility];  Service: Orthopedics;  Laterality: Right;    CARPAL TUNNEL RELEASE Left 7/18/2024    Procedure: RELEASE, CARPAL TUNNEL;  Surgeon: Noel Almonte MD;  Location: [facility] OR;  Service: Orthopedics;  Laterality: Left;    COLONOSCOPY N/A 04/05/2018    Procedure: COLONOSCOPY;  Surgeon: Chava Ronquillo MD;  Location: [facility] ENDO;  Service: Endoscopy;  Laterality: N/A;    COLONOSCOPY N/A 05/02/2022    Procedure: COLONOSCOPY;  Surgeon: Alix Puente MD;  Location: [facility] ENDO;  Service: Endoscopy;  Laterality: N/A;    COLONOSCOPY N/A 06/07/2023    Procedure: COLONOSCOPY - rule out CMV  Cardiac clearance/Eliquis hold approval received on 05/21/23 per Dr. Meade, cardiology.  Note in encounters.  LB;  Surgeon: Daniella Shah MD;  Location: [facility] ENDO;  Service: Endoscopy;  Laterality: N/A;    ESOPHAGOGASTRODUODENOSCOPY N/A 03/26/2024    Procedure: EGD (ESOPHAGOGASTRODUODENOSCOPY) 3/1-pt cleared, ok to hold eliquis for 3 days;  Surgeon: Daniella Shah MD;  Location: [facility];  Service: Endoscopy;  Laterality: N/A;    KIDNEY TRANSPLANT  2015    RETINAL LASER PROCEDURE         Review of patient's allergies indicates:   Allergen Reactions    Lisinopril Other (See Comments)     Other reaction(s):  cough    Actos  [pioglitazone] Other  (See Comments)     Other reaction(s): CHF    Metformin Other (See Comments)     Other reaction(s): renal insuff  Other reaction(s): CHF     Current Facility-Administered Medications   Medication Frequency    0.9%  NaCl infusion (for blood administration) Q24H PRN    0.9%  NaCl infusion (for blood administration) Q24H PRN    acetaminophen tablet 650 mg Q6H PRN    amitriptyline tablet 25 mg Nightly PRN    bumetanide injection 1 mg Once PRN    calcitRIOL capsule 0.25 mcg Daily    cholestyramine 4 gram packet 4 g BID    ciprofloxacin HCl 0.3 % ophthalmic solution 1 drop Q6H    dextromethorphan-guaiFENesin  mg/5 ml liquid 5 mL Q4H PRN    dextrose 50% injection 12.5 g PRN    dextrose 50% injection 25 g PRN    ferrous sulfate tablet 1 each Daily    glucagon (human recombinant) injection 1 mg PRN    glucose chewable tablet 16 g PRN    glucose chewable tablet 24 g PRN    hydrALAZINE injection 10 mg Q6H PRN    HYDROcodone-acetaminophen 5-325 mg per tablet 1 tablet Q8H PRN    insulin aspart U-100 pen 0-10 Units QID (AC + HS) PRN    insulin glargine U-100 (Lantus) pen 15 Units BID    magnesium oxide tablet 400 mg Daily    metoprolol succinate (TOPROL-XL) 24 hr tablet 25 mg Daily    [START ON 3/10/2025] mycophenolate tablet 500 mg QHS    ondansetron disintegrating tablet 4 mg Q6H PRN    potassium chloride SA CR tablet 40 mEq BID    predniSONE tablet 5 mg Daily    tacrolimus capsule 4 mg Daily AM    And    tacrolimus capsule 4 mg Daily PM    vancomycin 125 mg/5 mL oral solution 125 mg Q6H     Family History       Problem Relation (Age of Onset)    Diabetes Mother, Sister, Maternal Grandmother    Heart failure Mother, Father    Hypertension Mother    Kidney disease Sister          Tobacco Use    Smoking status: Former     Current packs/day: 0.00     Types: Cigarettes     Quit date: 2013     Years since quittin.7     Passive exposure: Past    Smokeless tobacco: Former     Quit date: 2013    Tobacco comments:      used marijuana since [id number], stopped after started dialysis   Substance and Sexual Activity    Alcohol use: No     Alcohol/week: 0.0 standard drinks of alcohol    Drug use: Not Currently     Comment:      Sexual activity: Never     Review of Systems   Constitutional:  Negative for fever.   Respiratory:  Negative for shortness of breath.    Gastrointestinal:  Positive for blood in stool and diarrhea. Negative for nausea and vomiting.   Allergic/Immunologic: Positive for immunocompromised state.   Psychiatric/Behavioral:  Negative for confusion and decreased concentration.      Objective:     Vital Signs (Most Recent):  Temp: 97.5 °F (36.4 °C) (03/09/25 1048)  Pulse: 83 (03/09/25 1048)  Resp: 19 (03/09/25 1048)  BP: 109/69 (03/09/25 1048)  SpO2: 96 % (03/09/25 1048) Vital Signs (24h Range):  Temp:  [97.3 °F (36.3 °C)-98.7 °F (37.1 °C)] 97.5 °F (36.4 °C)  Pulse:  [] 83  Resp:  [18-19] 19  SpO2:  [94 %-99 %] 96 %  BP: ()/(52-85) 109/69     Weight: 104.6 kg (230 lb 9.6 oz) (03/04/25 0458)  Body mass index is 31.28 kg/m².  Body surface area is 2.31 meters squared.    I/O last 3 completed shifts:  In: -   Out: 125 [Urine:125]    Physical Exam  Vitals and nursing note reviewed.   Constitutional:       General: He is not in acute distress.     Appearance: He is obese.   Cardiovascular:      Rate and Rhythm: Normal rate.   Pulmonary:      Effort: Pulmonary effort is normal. No respiratory distress.   Neurological:      Mental Status: He is alert and oriented to person, place, and time.   Psychiatric:         Mood and Affect: Mood normal.         Significant Labs: historical renal labs reviewed, current hospital labs  reviewed         Assessment/Plan:     Active Diagnoses:    Diagnosis Date Noted POA    PRINCIPAL PROBLEM:  ANGELITO (acute kidney injury) [N17.9] 12/08/2022 Yes    Anemia, chronic disease [D63.8] 03/01/2025 Yes    UTI (urinary tract infection) [N39.0] 02/27/2025 Yes    Gross hematuria [R31.0]  01/13/2025 Yes    Chronic cough [R05.3] 01/04/2023 Yes    Deep vein thrombosis (DVT) of lower extremity [I82.409] 07/24/2019 Yes    Chronic combined systolic and diastolic congestive heart failure [I50.42] 03/15/2019 Yes    Type II diabetes mellitus with renal manifestations [E11.29]  Yes    Chronic immunosuppression with Prograf, MMF and prednisone [Z29.89] 06/18/2015 Not Applicable     Chronic    Class 3 severe obesity due to excess calories with body mass index (BMI) of 40.0 to 44.9 in adult [E66.813, Z68.41, E66.01]  Not Applicable      Problems Resolved During this Admission:     ANGELITO on CKD 3  - baseline serum creatinine about 1.5 mg/dL, admission creatinine 4.3,  - resolved   - check chemistries in am      S/p LRD kidney transplant (daughter)  in 2015  - now with C diff, since blood in stool will hold Cellcept (not decrease dose)   - check WBC and diff today and in am   - Prograf home dose was 4/3 but was increased to 4/4 on 3/6, however, pt has not received his ketoconazole since admission  (ketoconazole use is to increase Prograf levels)   - check daily Prograf levels     Hypertension  - monitor blood pressure on current medications      Hypokalemia.    - improved on potassium supplements  - check in am   - check mag levels      HFrEF.    - Last echo reports LV ejection fraction about 35%.  He also has grade 1 diastolic function.       Anemia.    - Likely multifactorial.  Monitor hemoglobin.  PRBC transfusion indicated    Leon Jones MD  Nephrology  [facility]

## 2025-03-09 NOTE — ASSESSMENT & PLAN NOTE
Will hold CellCept for now Continue Prograf   Prograf levels 3.4  Continue prednisone    3/9/25  Hold Cellcept due to C. Diff

## 2025-03-09 NOTE — ASSESSMENT & PLAN NOTE
ANGELITO is likely due to pre-renal azotemia due to dehydration. Baseline creatinine is 1.4. Most recent creatinine and eGFR are listed below.  Recent Labs     03/08/25  0635 03/09/25  1612   CREATININE 1.3 1.5*   EGFRNORACEVR 59* 49*      Plan  Hold diuretics   Monitor urine output   Urine sodium and creatinine   Consult Nephrology on case   Continue IVF  Patient may need renal biopsy    3/2/25  Creatinine improving   Discontinue intravenous fluids per nephrology   Nephrology on case    3/3/25  Continues to improve.   Hold Entresto and diuretics    3/4/25  Improving    3/7/25  stable

## 2025-03-09 NOTE — PLAN OF CARE
Discussed poc with pt, pt verbalized understanding     Purposeful rounding every 2hours  Pt had multiple liquid stools     Cardiac monitoring in use, pt is NSR, tele monitor # [id number]  Blood glucose monitoring   Fall precautions in place, remains injury free     Accurate I&Os  Bed locked at lowest position  Call light within reach     Chart check complete  Will cont with POC        Problem: Adult Inpatient Plan of Care  Goal: Plan of Care Review  Outcome: Progressing  Goal: Patient-Specific Goal (Individualized)  Outcome: Progressing  Goal: Absence of Hospital-Acquired Illness or Injury  Outcome: Progressing  Goal: Optimal Comfort and Wellbeing  Outcome: Progressing  Goal: Readiness for Transition of Care  Outcome: Progressing     Problem: Bariatric Environmental Safety  Goal: Safety Maintained with Care  Outcome: Progressing     Problem: Diabetes Comorbidity  Goal: Blood Glucose Level Within Targeted Range  Outcome: Progressing     Problem: Acute Kidney Injury/Impairment  Goal: Fluid and Electrolyte Balance  Outcome: Progressing  Goal: Improved Oral Intake  Outcome: Progressing  Goal: Effective Renal Function  Outcome: Progressing     Problem: Skin Injury Risk Increased  Goal: Skin Health and Integrity  Outcome: Progressing     Problem: Infection  Goal: Absence of Infection Signs and Symptoms  Outcome: Progressing     Problem: Fall Injury Risk  Goal: Absence of Fall and Fall-Related Injury  Outcome: Progressing     Problem: Wound  Goal: Optimal Coping  Outcome: Progressing  Goal: Optimal Functional Ability  Outcome: Progressing  Goal: Absence of Infection Signs and Symptoms  Outcome: Progressing  Goal: Improved Oral Intake  Outcome: Progressing  Goal: Optimal Pain Control and Function  Outcome: Progressing  Goal: Skin Health and Integrity  Outcome: Progressing  Goal: Optimal Wound Healing  Outcome: Progressing

## 2025-03-09 NOTE — ASSESSMENT & PLAN NOTE
Patient's FSGs are controlled on current medication regimen.  Last A1c reviewed-   Lab Results   Component Value Date    HGBA1C 5.7 (H) 12/17/2024     Most recent fingerstick glucose reviewed-   Recent Labs   Lab 03/09/25  0659 03/09/25  0807 03/09/25  1218 03/09/25  1709   POCTGLUCOSE 59* 110 84 127*     Current correctional scale  Medium  Maintain anti-hyperglycemic dose as follows-   Antihyperglycemics (From admission, onward)      Start     Stop Route Frequency Ordered    03/08/25 0900  insulin glargine U-100 (Lantus) pen 15 Units         -- SubQ 2 times daily 03/08/25 0742    02/26/25 1551  insulin aspart U-100 pen 0-10 Units         -- SubQ Before meals & nightly PRN 02/26/25 1451          Hold Oral hypoglycemics while patient is in the hospital.  Continue adjusting insulin needs as indicated     3/4/25   Glucose above goal  Optimize long acting 20 units BID    3/5/25  Glucose better controlled today

## 2025-03-10 PROBLEM — N17.9 AKI (ACUTE KIDNEY INJURY): Status: RESOLVED | Noted: 2022-12-08 | Resolved: 2025-03-10

## 2025-03-10 LAB
ALBUMIN SERPL BCP-MCNC: 2.1 G/DL (ref 3.5–5.2)
ALP SERPL-CCNC: 79 U/L (ref 40–150)
ALT SERPL W/O P-5'-P-CCNC: 12 U/L (ref 10–44)
ANION GAP SERPL CALC-SCNC: 7 MMOL/L (ref 8–16)
ANISOCYTOSIS BLD QL SMEAR: SLIGHT
AST SERPL-CCNC: 10 U/L (ref 10–40)
BASOPHILS NFR BLD: 2 % (ref 0–1.9)
BILIRUB SERPL-MCNC: 0.4 MG/DL (ref 0.1–1)
BUN SERPL-MCNC: 29 MG/DL (ref 8–23)
CALCIUM SERPL-MCNC: 8.9 MG/DL (ref 8.7–10.5)
CHLORIDE SERPL-SCNC: 114 MMOL/L (ref 95–110)
CO2 SERPL-SCNC: 20 MMOL/L (ref 23–29)
CREAT SERPL-MCNC: 1.6 MG/DL (ref 0.5–1.4)
DIFFERENTIAL METHOD BLD: ABNORMAL
EOSINOPHIL NFR BLD: 3 % (ref 0–8)
ERYTHROCYTE [DISTWIDTH] IN BLOOD BY AUTOMATED COUNT: 15.6 % (ref 11.5–14.5)
EST. GFR  (NO RACE VARIABLE): 46 ML/MIN/1.73 M^2
GLUCOSE SERPL-MCNC: 101 MG/DL (ref 70–110)
HCT VFR BLD AUTO: 33.2 % (ref 40–54)
HGB BLD-MCNC: 9.3 G/DL (ref 14–18)
IMM GRANULOCYTES # BLD AUTO: ABNORMAL K/UL (ref 0–0.04)
IMM GRANULOCYTES NFR BLD AUTO: ABNORMAL % (ref 0–0.5)
LYMPHOCYTES NFR BLD: 39 % (ref 18–48)
MAGNESIUM SERPL-MCNC: 1.6 MG/DL (ref 1.6–2.6)
MCH RBC QN AUTO: 24.4 PG (ref 27–31)
MCHC RBC AUTO-ENTMCNC: 28 G/DL (ref 32–36)
MCV RBC AUTO: 87 FL (ref 82–98)
MONOCYTES NFR BLD: 13 % (ref 4–15)
NEUTROPHILS NFR BLD: 43 % (ref 38–73)
NRBC BLD-RTO: 0 /100 WBC
OVALOCYTES BLD QL SMEAR: ABNORMAL
PHOSPHATE SERPL-MCNC: 2.9 MG/DL (ref 2.7–4.5)
PLATELET # BLD AUTO: 247 K/UL (ref 150–450)
PLATELET BLD QL SMEAR: ABNORMAL
PMV BLD AUTO: 10.4 FL (ref 9.2–12.9)
POCT GLUCOSE: 110 MG/DL (ref 70–110)
POCT GLUCOSE: 123 MG/DL (ref 70–110)
POCT GLUCOSE: 154 MG/DL (ref 70–110)
POCT GLUCOSE: 161 MG/DL (ref 70–110)
POIKILOCYTOSIS BLD QL SMEAR: SLIGHT
POLYCHROMASIA BLD QL SMEAR: ABNORMAL
POTASSIUM SERPL-SCNC: 4.5 MMOL/L (ref 3.5–5.1)
PROT SERPL-MCNC: 4.8 G/DL (ref 6–8.4)
RBC # BLD AUTO: 3.81 M/UL (ref 4.6–6.2)
SODIUM SERPL-SCNC: 141 MMOL/L (ref 136–145)
WBC # BLD AUTO: 2.77 K/UL (ref 3.9–12.7)

## 2025-03-10 PROCEDURE — 25000003 PHARM REV CODE 250: Performed by: INTERNAL MEDICINE

## 2025-03-10 PROCEDURE — 85027 COMPLETE CBC AUTOMATED: CPT | Performed by: INTERNAL MEDICINE

## 2025-03-10 PROCEDURE — 21400001 HC TELEMETRY ROOM

## 2025-03-10 PROCEDURE — 84100 ASSAY OF PHOSPHORUS: CPT | Performed by: INTERNAL MEDICINE

## 2025-03-10 PROCEDURE — 99233 SBSQ HOSP IP/OBS HIGH 50: CPT | Mod: FS,,, | Performed by: INTERNAL MEDICINE

## 2025-03-10 PROCEDURE — 27000207 HC ISOLATION

## 2025-03-10 PROCEDURE — 63600175 PHARM REV CODE 636 W HCPCS: Performed by: INTERNAL MEDICINE

## 2025-03-10 PROCEDURE — 25000003 PHARM REV CODE 250: Performed by: FAMILY MEDICINE

## 2025-03-10 PROCEDURE — 36415 COLL VENOUS BLD VENIPUNCTURE: CPT | Performed by: INTERNAL MEDICINE

## 2025-03-10 PROCEDURE — 80197 ASSAY OF TACROLIMUS: CPT | Performed by: INTERNAL MEDICINE

## 2025-03-10 PROCEDURE — 63600175 PHARM REV CODE 636 W HCPCS: Performed by: FAMILY MEDICINE

## 2025-03-10 PROCEDURE — 25000003 PHARM REV CODE 250: Performed by: NURSE PRACTITIONER

## 2025-03-10 PROCEDURE — 80053 COMPREHEN METABOLIC PANEL: CPT | Performed by: INTERNAL MEDICINE

## 2025-03-10 PROCEDURE — 85007 BL SMEAR W/DIFF WBC COUNT: CPT | Performed by: INTERNAL MEDICINE

## 2025-03-10 PROCEDURE — 83735 ASSAY OF MAGNESIUM: CPT | Performed by: INTERNAL MEDICINE

## 2025-03-10 RX ORDER — SODIUM CHLORIDE 450 MG/100ML
INJECTION, SOLUTION INTRAVENOUS CONTINUOUS
Status: ACTIVE | OUTPATIENT
Start: 2025-03-10 | End: 2025-03-11

## 2025-03-10 RX ORDER — POTASSIUM CHLORIDE 750 MG/1
10 TABLET, EXTENDED RELEASE ORAL DAILY
Status: DISCONTINUED | OUTPATIENT
Start: 2025-03-11 | End: 2025-03-11

## 2025-03-10 RX ORDER — TACROLIMUS 1 MG/1
3 CAPSULE ORAL EVERY EVENING
Status: DISCONTINUED | OUTPATIENT
Start: 2025-03-10 | End: 2025-03-12 | Stop reason: HOSPADM

## 2025-03-10 RX ORDER — SODIUM BICARBONATE 650 MG/1
650 TABLET ORAL 2 TIMES DAILY
Status: DISCONTINUED | OUTPATIENT
Start: 2025-03-10 | End: 2025-03-12 | Stop reason: HOSPADM

## 2025-03-10 RX ORDER — TACROLIMUS 1 MG/1
4 CAPSULE ORAL EVERY MORNING
Status: DISCONTINUED | OUTPATIENT
Start: 2025-03-11 | End: 2025-03-12 | Stop reason: HOSPADM

## 2025-03-10 RX ORDER — MAGNESIUM SULFATE HEPTAHYDRATE 40 MG/ML
2 INJECTION, SOLUTION INTRAVENOUS ONCE
Status: COMPLETED | OUTPATIENT
Start: 2025-03-10 | End: 2025-03-10

## 2025-03-10 RX ADMIN — VANCOMYCIN HYDROCHLORIDE 125 MG: KIT at 11:03

## 2025-03-10 RX ADMIN — SODIUM BICARBONATE 650 MG TABLET 650 MG: at 09:03

## 2025-03-10 RX ADMIN — CHOLESTYRAMINE 4 G: 4 POWDER, FOR SUSPENSION ORAL at 09:03

## 2025-03-10 RX ADMIN — CALCITRIOL CAPSULES 0.25 MCG 0.25 MCG: 0.25 CAPSULE ORAL at 09:03

## 2025-03-10 RX ADMIN — PREDNISONE 5 MG: 5 TABLET ORAL at 09:03

## 2025-03-10 RX ADMIN — INSULIN GLARGINE 15 UNITS: 100 INJECTION, SOLUTION SUBCUTANEOUS at 09:03

## 2025-03-10 RX ADMIN — MAGNESIUM SULFATE HEPTAHYDRATE 2 G: 40 INJECTION, SOLUTION INTRAVENOUS at 01:03

## 2025-03-10 RX ADMIN — VANCOMYCIN HYDROCHLORIDE 125 MG: KIT at 05:03

## 2025-03-10 RX ADMIN — TACROLIMUS 4 MG: 1 CAPSULE ORAL at 09:03

## 2025-03-10 RX ADMIN — INSULIN ASPART 1 UNITS: 100 INJECTION, SOLUTION INTRAVENOUS; SUBCUTANEOUS at 09:03

## 2025-03-10 RX ADMIN — MAGNESIUM OXIDE TAB 400 MG (241.3 MG ELEMENTAL MG) 400 MG: 400 (241.3 MG) TAB at 09:03

## 2025-03-10 RX ADMIN — SODIUM CHLORIDE: 4.5 INJECTION, SOLUTION INTRAVENOUS at 05:03

## 2025-03-10 RX ADMIN — KETOCONAZOLE 200 MG: 200 TABLET ORAL at 09:03

## 2025-03-10 RX ADMIN — TACROLIMUS 3 MG: 1 CAPSULE ORAL at 05:03

## 2025-03-10 RX ADMIN — FERROUS SULFATE TAB 325 MG (65 MG ELEMENTAL FE) 1 EACH: 325 (65 FE) TAB at 09:03

## 2025-03-10 RX ADMIN — METOPROLOL SUCCINATE 25 MG: 25 TABLET, EXTENDED RELEASE ORAL at 09:03

## 2025-03-10 RX ADMIN — CIPROFLOXACIN HYDROCHLORIDE 1 DROP: 3 SOLUTION/ DROPS OPHTHALMIC at 05:03

## 2025-03-10 RX ADMIN — POTASSIUM CHLORIDE 40 MEQ: 1500 TABLET, EXTENDED RELEASE ORAL at 09:03

## 2025-03-10 NOTE — PLAN OF CARE
Discussed poc with pt, pt verbalized understanding    Purposeful rounding every 2hours    VS wnl  Cardiac monitoring in use, pt is NSR, tele monitor # [id number]  Blood glucose monitoring   Fall precautions in place, remains injury free  Pt denies c/o pain    Contact precautions maintained, pt still presents with loose stools    Accurate I&Os  Abx given as prescribed  Bed locked at lowest position  Call light within reach    Chart check complete  Will cont with POC

## 2025-03-10 NOTE — SUBJECTIVE & OBJECTIVE
Interval History: VSS, afebrile, WBC normal. +continued diarrhea. Denies abdominal pain. Good appetite.     Review of Systems   Constitutional:  Positive for activity change and fatigue. Negative for appetite change and fever.   Respiratory:  Positive for cough.    Cardiovascular:  Positive for leg swelling.   Gastrointestinal:  Positive for diarrhea. Negative for abdominal pain.   Genitourinary:  Negative for difficulty urinating, dysuria and hematuria.   Musculoskeletal:  Positive for arthralgias.   Allergic/Immunologic: Positive for immunocompromised state.   Neurological:  Positive for weakness.   Psychiatric/Behavioral:  Negative for agitation, behavioral problems, confusion, decreased concentration and dysphoric mood.        Objective:     Vital Signs (Most Recent):  Temp: 97.6 °F (36.4 °C) (03/10/25 0800)  Pulse: 93 (03/10/25 0925)  Resp: 18 (03/10/25 0800)  BP: 128/64 (03/10/25 0800)  SpO2: 98 % (03/10/25 0800) Vital Signs (24h Range):  Temp:  [97.6 °F (36.4 °C)-98.3 °F (36.8 °C)] 97.6 °F (36.4 °C)  Pulse:  [50-97] 93  Resp:  [18-20] 18  SpO2:  [96 %-99 %] 98 %  BP: (116-137)/(57-72) 128/64     Weight: 101 kg (222 lb 10.6 oz)  Body mass index is 30.2 kg/m².    Intake/Output Summary (Last 24 hours) at 3/10/2025 1057  Last data filed at 3/10/2025 0931  Gross per 24 hour   Intake 425.63 ml   Output --   Net 425.63 ml         Physical Exam  Vitals and nursing note reviewed.   Constitutional:       General: He is not in acute distress.     Appearance: He is ill-appearing. He is not toxic-appearing.   HENT:      Head: Normocephalic and atraumatic.   Cardiovascular:      Rate and Rhythm: Normal rate.   Pulmonary:      Effort: Pulmonary effort is normal. No respiratory distress.   Abdominal:      Palpations: Abdomen is soft.      Tenderness: There is no abdominal tenderness.   Musculoskeletal:      Right lower leg: Edema present.      Left lower leg: Edema present.   Skin:     General: Skin is warm.   Neurological:       Mental Status: He is alert and oriented to person, place, and time.      Motor: Weakness present.           Significant Labs: All pertinent labs within the past 24 hours have been reviewed.  CBC:   Recent Labs   Lab 03/09/25  1612 03/10/25  0725   WBC 2.75* 2.77*   HGB 9.1* 9.3*   HCT 32.7* 33.2*    247     CMP:   Recent Labs   Lab 03/09/25  1612 03/10/25  0725    141   K 5.1 4.5   * 114*   CO2 21* 20*   * 101   BUN 26* 29*   CREATININE 1.5* 1.6*   CALCIUM 9.0 8.9   PROT  --  4.8*   ALBUMIN 2.0* 2.1*   BILITOT  --  0.4   ALKPHOS  --  79   AST  --  10   ALT  --  12   ANIONGAP 8 7*       Significant Imaging: I have reviewed all pertinent imaging results/findings within the past 24 hours.

## 2025-03-10 NOTE — PLAN OF CARE
Pt is stable. No Sx of acute distress  Contact precautions maintained  Abx given as ordered  Denies any pain   Turned q. 2  Blood glucose monitored   Report given to Nay  Awaiting SNF    Chart orders reviewed. Bed in lowest position, wheels locked, call light within reach. Pt remains injury free. Will cont POC

## 2025-03-10 NOTE — PLAN OF CARE
Pt's family provided the medicaid application and financials to escobar. ESCOBAR faxed paperwork to Lelia at [facility] on Saturday. Lelia stated she needs the rest of the financials from family but may be able to submit for auth today.  12:00PM ESCOBAR spoke with Lelia from [facility] who stated pt's family didn't provided all needed financials or finish paperwork. Lelia requested needed information from pt's family.

## 2025-03-10 NOTE — ASSESSMENT & PLAN NOTE
Anemia is likely due to acute blood loss which was from hematuria and chronic disease due to Chronic Kidney Disease. Most recent hemoglobin and hematocrit are listed below.  Recent Labs     03/08/25  0635 03/09/25  1612 03/10/25  0725   HGB 9.4* 9.1* 9.3*   HCT 33.1* 32.7* 33.2*     Plan  - Monitor serial CBC: Daily  - Transfuse PRBC if patient becomes hemodynamically unstable, symptomatic or H/H drops below 7/21.  - Patient has received 1 units of PRBCs on 3/1/25  - Patient's anemia is currently stable  - cbc in am    3/5/25  No more episodes of hematuria. Will give additional unit of blood today to reach goal of 8g/dL    3/7/25  Stable

## 2025-03-10 NOTE — ASSESSMENT & PLAN NOTE
Patient's FSGs are controlled on current medication regimen.  Last A1c reviewed-   Lab Results   Component Value Date    HGBA1C 5.7 (H) 12/17/2024     Most recent fingerstick glucose reviewed-   Recent Labs   Lab 03/09/25  1218 03/09/25  1709 03/09/25 2059 03/10/25  0547   POCTGLUCOSE 84 127* 162* 110     Current correctional scale  Medium  Maintain anti-hyperglycemic dose as follows-   Antihyperglycemics (From admission, onward)      Start     Stop Route Frequency Ordered    03/08/25 0900  insulin glargine U-100 (Lantus) pen 15 Units         -- SubQ 2 times daily 03/08/25 0742    02/26/25 1551  insulin aspart U-100 pen 0-10 Units         -- SubQ Before meals & nightly PRN 02/26/25 1451          Hold Oral hypoglycemics while patient is in the hospital.  Continue adjusting insulin needs as indicated     3/4/25   Glucose above goal  Optimize long acting 20 units BID    3/5/25  Glucose better controlled today

## 2025-03-10 NOTE — PROGRESS NOTES
[place] [facility] Surg  Nephrology  Progress Note    Patient Name: Mitch Whittaker  MRN: [id number]  Admission Date: 2/26/2025  Hospital Length of Stay: 12 days  Attending Provider: Lindsey Ferreira MD   Primary Care Physician: Valery Caal MD  Principal Problem:C. difficile colitis  Kidney Transplant status and IS in setting of infection   Primary Nephrologist: Dr. Gonsalez       Subjective:     HPI:   68 yo male s/p kidney transplant in 2015 (dtr was donor) admitted for ANGELITO on CKD with baseline creatinine around 1.5mg/dL. ANGELITO improved to baseline since admission but Nephrology was re-consulted for use of Immunosuppression in setting of newly diagnosed C diff. He was started on po vancomycin.  No new labs today.   He notes frequent stooling for about a week. Today he noted blood in the stool.     March 10 2025  - stools are about the same, has received 4 doses total of po vanc, also on Questran   - notes tenderness to R neck       Review of patient's allergies indicates:   Allergen Reactions    Lisinopril Other (See Comments)     Other reaction(s):  cough    Actos  [pioglitazone] Other (See Comments)     Other reaction(s): CHF    Metformin Other (See Comments)     Other reaction(s): renal insuff  Other reaction(s): CHF     Current Facility-Administered Medications   Medication Frequency    0.9%  NaCl infusion (for blood administration) Q24H PRN    0.9%  NaCl infusion (for blood administration) Q24H PRN    acetaminophen tablet 650 mg Q6H PRN    amitriptyline tablet 25 mg Nightly PRN    bumetanide injection 1 mg Once PRN    calcitRIOL capsule 0.25 mcg Daily    cholestyramine 4 gram packet 4 g BID    ciprofloxacin HCl 0.3 % ophthalmic solution 1 drop Q6H    dextromethorphan-guaiFENesin  mg/5 ml liquid 5 mL Q4H PRN    dextrose 50% injection 12.5 g PRN    dextrose 50% injection 25 g PRN    ferrous sulfate tablet 1 each Daily    glucagon (human recombinant) injection 1 mg PRN    glucose chewable tablet 16 g PRN     glucose chewable tablet 24 g PRN    hydrALAZINE injection 10 mg Q6H PRN    HYDROcodone-acetaminophen 5-325 mg per tablet 1 tablet Q8H PRN    insulin aspart U-100 pen 0-10 Units QID (AC + HS) PRN    insulin glargine U-100 (Lantus) pen 15 Units BID    ketoconazole tablet 200 mg Daily    magnesium sulfate 2g in water 50mL IVPB (premix) Once    metoprolol succinate (TOPROL-XL) 24 hr tablet 25 mg Daily    ondansetron disintegrating tablet 4 mg Q6H PRN    [START ON 3/11/2025] potassium chloride SA CR tablet 10 mEq Daily    predniSONE tablet 5 mg Daily    sodium bicarbonate tablet 650 mg BID    tacrolimus capsule 4 mg Daily AM    And    tacrolimus capsule 4 mg Daily PM    vancomycin 125 mg/5 mL oral solution 125 mg Q6H           Review of Systems  Objective:     Vital Signs (Most Recent):  Temp: 97.6 °F (36.4 °C) (03/10/25 0800)  Pulse: 99 (03/10/25 1211)  Resp: 18 (03/10/25 1211)  BP: (!) 115/59 (03/10/25 1211)  SpO2: 100 % (03/10/25 1211) Vital Signs (24h Range):  Temp:  [97.6 °F (36.4 °C)-98.3 °F (36.8 °C)] 97.6 °F (36.4 °C)  Pulse:  [50-99] 99  Resp:  [18-20] 18  SpO2:  [96 %-100 %] 100 %  BP: (115-137)/(57-72) 115/59     Weight: 101 kg (222 lb 10.6 oz) (03/10/25 0526)  Body mass index is 30.2 kg/m².  Body surface area is 2.27 meters squared.    I/O last 3 completed shifts:  In: 185.6 [I.V.:185.6]  Out: 125 [Urine:125]    Physical Exam  Vitals and nursing note reviewed.   Constitutional:       General: He is not in acute distress.     Appearance: He is obese.   Neck:      Comments: Right tender LN   Cardiovascular:      Rate and Rhythm: Normal rate.   Pulmonary:      Effort: Pulmonary effort is normal. No respiratory distress.   Musculoskeletal:         General: Swelling present.   Neurological:      Mental Status: He is alert and oriented to person, place, and time. Mental status is at baseline.   Psychiatric:         Mood and Affect: Mood normal.         Significant Labs: current hospital labs  reviewed          Assessment/Plan:     Active Diagnoses:    Diagnosis Date Noted POA    PRINCIPAL PROBLEM:  C. difficile colitis [A04.72] 2025 Yes    Anemia, chronic disease [D63.8] 2025 Yes    UTI (urinary tract infection) [N39.0] 2025 Yes    Gross hematuria [R31.0] 2025 Yes    Chronic cough [R05.3] 2023 Yes    Deep vein thrombosis (DVT) of lower extremity [I82.409] 2019 Yes    Chronic combined systolic and diastolic congestive heart failure [I50.42] 03/15/2019 Yes    Type II diabetes mellitus with renal manifestations [E11.29]  Yes    Chronic immunosuppression with Prograf, MMF and prednisone [Z29.89] 2015 Not Applicable     Chronic    Class 3 severe obesity due to excess calories with body mass index (BMI) of 40.0 to 44.9 in adult [E66.813, Z68.41, E66.01]  Not Applicable      Problems Resolved During this Admission:    Diagnosis Date Noted Date Resolved POA    ANGELITO (acute kidney injury) [N17.9] 2022 03/10/2025 Yes     ANGELITO on CKD 3  - baseline serum creatinine about 1.5 mg/dL, admission creatinine 4.3,  - initially resolved but sCr increasing likely due to volume loss from stools. Replete with small amount of IV fluids,  - check daily chemistries     S/p LRD kidney transplant (daughter) in   - now with C diff  - holding Cellcept due to acute infection (plus WBC is down in 2,000 count)  - continue Prograf but now back on Ketoconazole will resume home dose of 4/3 (was increased to 4/4 on 3/6, however, pt did not receive his ketoconazole at admission, resumed yesterday. Ketoconazole used is to increase Prograf levels)   - continue prednisone 5mg QD  - check daily Prograf levels     Metabolic acidosis  - add po bicarb     Hypertension  - monitor blood pressure on current medications      Hypokalemia.    - improved on potassium supplements,  dose   - check daily while on po KCl     Hypomagnesemia  - replete IV as po mag can contribute to diarrhea      HFrEF.    - Last  echo reports LV ejection fraction about 35%.  He also has grade 1 diastolic function.       Anemia.    - Likely multifactorial.  Monitor hemoglobin.  PRBC transfusion if indicated    Leon Jones MD  Nephrology  '[facility] Surg  65 minutes of total time spent on the encounter, which includes face to face time and non-face to face time preparing to see the patient (eg, review of tests), Obtaining and/or reviewing separately obtained history, Documenting clinical information in the electronic or other health record, Independently interpreting results (not separately reported) and communicating results to the patient/family/caregiver, or Care coordination (not separately reported).

## 2025-03-10 NOTE — PT/OT/SLP PROGRESS
"Occupational Therapy      Patient Name:  Mitch Whittaker   MRN:  [id number]    OT treatment attempted at 0810-pt adamantly refusing, stating "Anytime I move I get diarrhea" and "No, I am not doing anything." Patient provided with education on importance of participation. Pt still adamantly refused.   Will continue efforts.    Laurie FREGOSO, LOTR  3/10/2025  "

## 2025-03-10 NOTE — ASSESSMENT & PLAN NOTE
Body mass index is 30.2 kg/m². Morbid obesity complicates all aspects of disease management from diagnostic modalities to treatment. Weight loss encouraged and health benefits explained to patient.   Physical/occupational therapy

## 2025-03-10 NOTE — PROGRESS NOTES
[facility] Medicine  Progress Note    Patient Name: Mitch Whittaker  MRN: [id number]  Patient Class: IP- Inpatient   Admission Date: 2/26/2025  Length of Stay: 12 days  Attending Physician: Lindsey Ferreira MD  Primary Care Provider: Valery Caal MD        Subjective     Principal Problem:C. difficile colitis        HPI:  Patient is a 69-year-old  male with a PMH of CHF, kidney transplant, CAD, DM, DVT who presents to the ED with complaints of weakness.  Patient is a resident at [facility].  He states he was sent here for renal failure.  Patient does report still producing urine however it is decreased.  Does complain of some dysuria.  Reports that oral intake is good.  Denies any fever or chills.  No other issues reported at this time.    In the ED, H&H 8.0/27, K:  2.9, BUN/CR 60/4.3.  ED discussed with Dr. Gonsalez who recommended fluid challenge due to concern with over-diuresis.        Overview/Hospital Course:  02/27/2025  Will start empiric Rocephin for UTI.  Creatinine stable.  Continue IVF.  Nephrology on case.  Patient may need renal biopsy.  02/28/2025  Creatinine trending down.  Will continue IVF.  Gross hematuria improving.  Continue Rocephin for UTI.  3/1 creatinine continues to improve. Discontinue intravenous fluids per nephrology. Discontinue gan per nephrology, monitor for urinary retention. Discussed blood transfusion with nephrology and patient also agreeable. No transfusion reaction in the past.  3/2 urology consulted. hematuria improving. Denies abdominal pain. Complains of weakness and cough and leg swelling. Physical/occupational therapy consulted. Creatinine improving  3/3: creatinine continues to improve. Entresto and diuretics remains on hold. Episode of hematuria with clots overnight, but resolved this AM. Outpatient follow up with Urology for cystoscopy. Continue Rocephin for UTI  3/4: Creatine has returned to baseline. He has completed Rocephin for  UTI. Intermittent episodes of hematuria that began last night. Patient is urinating with difficulty. Per urology still recommends outpatient follow up. Awaiting decision for SNF versus HH.   3/5/25: patient and family are agreeable to SNF. Urine dark yellow today. Hemoglobin still below goal of 8g/dL. Will transfuse additional unit today and give Bumex post transfusion.   3/6/25: Awaiting placement at [facility], [place]  3/7/25: facility is waiting on financial documents. Family will turn in this weekend or will discharge home with HH.  3/8/25: Financial documents faxed over. Awaiting placement.   3/9/25: complaints of diarrhea over the weekend. C. Diff positive. Oral Vancomycin started. Hold Cellcept  3/10/25: pt had 6 diarrhea overnight- cont oral Vanco and cholestyramine     Interval History: VSS, afebrile, WBC normal. +continued diarrhea. Denies abdominal pain. Good appetite.     Review of Systems   Constitutional:  Positive for activity change and fatigue. Negative for appetite change and fever.   Respiratory:  Positive for cough.    Cardiovascular:  Positive for leg swelling.   Gastrointestinal:  Positive for diarrhea. Negative for abdominal pain.   Genitourinary:  Negative for difficulty urinating, dysuria and hematuria.   Musculoskeletal:  Positive for arthralgias.   Allergic/Immunologic: Positive for immunocompromised state.   Neurological:  Positive for weakness.   Psychiatric/Behavioral:  Negative for agitation, behavioral problems, confusion, decreased concentration and dysphoric mood.        Objective:     Vital Signs (Most Recent):  Temp: 97.6 °F (36.4 °C) (03/10/25 0800)  Pulse: 93 (03/10/25 0925)  Resp: 18 (03/10/25 0800)  BP: 128/64 (03/10/25 0800)  SpO2: 98 % (03/10/25 0800) Vital Signs (24h Range):  Temp:  [97.6 °F (36.4 °C)-98.3 °F (36.8 °C)] 97.6 °F (36.4 °C)  Pulse:  [50-97] 93  Resp:  [18-20] 18  SpO2:  [96 %-99 %] 98 %  BP: (116-137)/(57-72) 128/64     Weight: 101 kg (222 lb 10.6 oz)  Body mass  index is 30.2 kg/m².    Intake/Output Summary (Last 24 hours) at 3/10/2025 1057  Last data filed at 3/10/2025 0931  Gross per 24 hour   Intake 425.63 ml   Output --   Net 425.63 ml         Physical Exam  Vitals and nursing note reviewed.   Constitutional:       General: He is not in acute distress.     Appearance: He is ill-appearing. He is not toxic-appearing.   HENT:      Head: Normocephalic and atraumatic.   Cardiovascular:      Rate and Rhythm: Normal rate.   Pulmonary:      Effort: Pulmonary effort is normal. No respiratory distress.   Abdominal:      Palpations: Abdomen is soft.      Tenderness: There is no abdominal tenderness.   Musculoskeletal:      Right lower leg: Edema present.      Left lower leg: Edema present.   Skin:     General: Skin is warm.   Neurological:      Mental Status: He is alert and oriented to person, place, and time.      Motor: Weakness present.           Significant Labs: All pertinent labs within the past 24 hours have been reviewed.  CBC:   Recent Labs   Lab 03/09/25  1612 03/10/25  0725   WBC 2.75* 2.77*   HGB 9.1* 9.3*   HCT 32.7* 33.2*    247     CMP:   Recent Labs   Lab 03/09/25  1612 03/10/25  0725    141   K 5.1 4.5   * 114*   CO2 21* 20*   * 101   BUN 26* 29*   CREATININE 1.5* 1.6*   CALCIUM 9.0 8.9   PROT  --  4.8*   ALBUMIN 2.0* 2.1*   BILITOT  --  0.4   ALKPHOS  --  79   AST  --  10   ALT  --  12   ANIONGAP 8 7*       Significant Imaging: I have reviewed all pertinent imaging results/findings within the past 24 hours.      Assessment & Plan  C. difficile colitis  Hold Cellcept per Nephrology recommendations  --Oral Vancomycin  --Questrain  --ID consulted  Chronic immunosuppression with Prograf, MMF and prednisone  Will hold CellCept for now Continue Prograf   Prograf levels 3.4  Continue prednisone    3/9/25  Hold Cellcept due to C. Diff    Class 3 severe obesity due to excess calories with body mass index (BMI) of 40.0 to 44.9 in adult  Body mass  "index is 30.2 kg/m². Morbid obesity complicates all aspects of disease management from diagnostic modalities to treatment. Weight loss encouraged and health benefits explained to patient.   Physical/occupational therapy     Type II diabetes mellitus with renal manifestations  Patient's FSGs are controlled on current medication regimen.  Last A1c reviewed-   Lab Results   Component Value Date    HGBA1C 5.7 (H) 12/17/2024     Most recent fingerstick glucose reviewed-   Recent Labs   Lab 03/09/25  1218 03/09/25  1709 03/09/25  2059 03/10/25  0547   POCTGLUCOSE 84 127* 162* 110     Current correctional scale  Medium  Maintain anti-hyperglycemic dose as follows-   Antihyperglycemics (From admission, onward)      Start     Stop Route Frequency Ordered    03/08/25 0900  insulin glargine U-100 (Lantus) pen 15 Units         -- SubQ 2 times daily 03/08/25 0742    02/26/25 1551  insulin aspart U-100 pen 0-10 Units         -- SubQ Before meals & nightly PRN 02/26/25 1451          Hold Oral hypoglycemics while patient is in the hospital.  Continue adjusting insulin needs as indicated     3/4/25   Glucose above goal  Optimize long acting 20 units BID    3/5/25  Glucose better controlled today  Chronic combined systolic and diastolic congestive heart failure  Patient has Combined Systolic and Diastolic heart failure that is Chronic. On presentation their CHF was well compensated. Most recent BNP and echo results are listed below.  No results for input(s): "BNP" in the last 72 hours.    Latest ECHO  Results for orders placed during the hospital encounter of 11/26/24    Echo    Interpretation Summary    Left Ventricle: The left ventricle is normal in size. Normal wall thickness. There is concentric hypertrophy. Normal wall motion. Septal motion is consistent with bundle branch block. There is moderately reduced systolic function. Ejection fraction is approximately 35%. Grade I diastolic dysfunction.    Right Ventricle: Normal right " ventricular cavity size. Wall thickness is normal. Systolic function is normal.    IVC/SVC: Normal venous pressure at 3 mmHg.    Current Heart Failure Medications  metoprolol succinate (TOPROL-XL) 24 hr tablet 25 mg, Daily, Oral  hydrALAZINE injection 10 mg, Every 6 hours PRN, Intravenous  bumetanide injection 1 mg, Once as needed, Intravenous    Plan  - Monitor strict I&Os and daily weights.    - Place on telemetry  - Low sodium diet  - Place on fluid restriction of 1.5 L.   - Cardiology has not been consulted  - The patient's volume status is at their baseline  - patient appears euvolemic    One time bumex after blood transfusion    3/6/25  Stable.    Deep vein thrombosis (DVT) of lower extremity  Due to hematuria   Will hold anticoagulation for now  Hb/hct in am       3/5/25  No further episodes of hematuria. Will likely restart anticoagulation after Urology visit  Chronic cough  Follows pulmonology outpatient  Former smoker  Complains of dry cough but no dyspnea  Schedule breathing treatments and monitor response    Gross hematuria  Possibly related to Gan insertion   Will continue to monitor   should hematuria persists will plan to consult Urology on case  Hematuria improving  Discontinue gan  Agreeable to blood transfusion  Urology note reviewed  Continue intravenous antibiotic(s)   Will need outpatient urology for cystoscopy     3/4/25  Intermittent episodes of hematuria.   No indication for further workup IP if no urinary retention per Urology.   Will follow up outpatient    3/5/25  No episodes of hematuria today  Will plan for Urology OP visit once discharged      3/7/25  Appt scheduled for Dr. Camilo on 3/17/25 at 1100 on [place]. He will hold anticoagulation until then  UTI (urinary tract infection)  Continue Rocephin.   Completed  3//3/25      Anemia, chronic disease  Anemia is likely due to acute blood loss which was from hematuria and chronic disease due to Chronic Kidney Disease. Most recent  hemoglobin and hematocrit are listed below.  Recent Labs     03/08/25  0635 03/09/25  1612 03/10/25  0725   HGB 9.4* 9.1* 9.3*   HCT 33.1* 32.7* 33.2*     Plan  - Monitor serial CBC: Daily  - Transfuse PRBC if patient becomes hemodynamically unstable, symptomatic or H/H drops below 7/21.  - Patient has received 1 units of PRBCs on 3/1/25  - Patient's anemia is currently stable  - cbc in am    3/5/25  No more episodes of hematuria. Will give additional unit of blood today to reach goal of 8g/dL    3/7/25  Stable    VTE Risk Mitigation (From admission, onward)           Ordered     Place sequential compression device  Until discontinued         02/26/25 1607                    Discharge Planning   GRETEL:      Code Status: Full Code   Medical Readiness for Discharge Date:   Discharge Plan A: Skilled Nursing Facility                Please place Justification for DME        Emma Balbuena NP  Department of Hospital Medicine   [facility] - Med Surg

## 2025-03-10 NOTE — PROGRESS NOTES
"P.T. COMPLETED CHART REVIEW AND MET PT IN RM. PT REFUSING P.T. STATING THEY JUST CLEANED ME AND I MAY HAVE ANOTHER BOWEL MOVEMENT. PT REFUSING ALL THERAPY. P.T. EDUCATED ON IMPORTANCE OF EACH THERAPY SESSION AND HOW LAYING IN THE BED EACH DAY WILL CAUSE A SET BACK ON PT MOBILITY . PT STATED, " WE WILL JUST HAVE TO CATCH UP ANOTHER DAY." PT LEFT WITH ALL NEEDS MET. Jodie Rouse, PT, 3/10/2025      "

## 2025-03-11 LAB
ACANTHOCYTES BLD QL SMEAR: PRESENT
ALBUMIN SERPL BCP-MCNC: 2.1 G/DL (ref 3.5–5.2)
ALP SERPL-CCNC: 78 U/L (ref 40–150)
ALT SERPL W/O P-5'-P-CCNC: 12 U/L (ref 10–44)
ANION GAP SERPL CALC-SCNC: 6 MMOL/L (ref 8–16)
ANISOCYTOSIS BLD QL SMEAR: SLIGHT
AST SERPL-CCNC: 11 U/L (ref 10–40)
BASOPHILS NFR BLD: 0 % (ref 0–1.9)
BILIRUB SERPL-MCNC: 0.4 MG/DL (ref 0.1–1)
BUN SERPL-MCNC: 30 MG/DL (ref 8–23)
CALCIUM SERPL-MCNC: 8.9 MG/DL (ref 8.7–10.5)
CHLORIDE SERPL-SCNC: 113 MMOL/L (ref 95–110)
CO2 SERPL-SCNC: 20 MMOL/L (ref 23–29)
CREAT SERPL-MCNC: 1.6 MG/DL (ref 0.5–1.4)
DIFFERENTIAL METHOD BLD: ABNORMAL
EOSINOPHIL NFR BLD: 3 % (ref 0–8)
ERYTHROCYTE [DISTWIDTH] IN BLOOD BY AUTOMATED COUNT: 15.6 % (ref 11.5–14.5)
EST. GFR  (NO RACE VARIABLE): 46 ML/MIN/1.73 M^2
GLUCOSE SERPL-MCNC: 160 MG/DL (ref 70–110)
HCT VFR BLD AUTO: 31.6 % (ref 40–54)
HGB BLD-MCNC: 9.3 G/DL (ref 14–18)
IMM GRANULOCYTES # BLD AUTO: ABNORMAL K/UL (ref 0–0.04)
IMM GRANULOCYTES NFR BLD AUTO: ABNORMAL % (ref 0–0.5)
LACTATE SERPL-SCNC: 1.1 MMOL/L (ref 0.5–2.2)
LACTATE SERPL-SCNC: 2.2 MMOL/L (ref 0.5–2.2)
LYMPHOCYTES NFR BLD: 19 % (ref 18–48)
MCH RBC QN AUTO: 25.3 PG (ref 27–31)
MCHC RBC AUTO-ENTMCNC: 29.4 G/DL (ref 32–36)
MCV RBC AUTO: 86 FL (ref 82–98)
METAMYELOCYTES NFR BLD MANUAL: 2 %
MONOCYTES NFR BLD: 6 % (ref 4–15)
MYELOCYTES NFR BLD MANUAL: 1 %
NEUTROPHILS NFR BLD: 68 % (ref 38–73)
NEUTS BAND NFR BLD MANUAL: 1 %
NRBC BLD-RTO: 0 /100 WBC
OVALOCYTES BLD QL SMEAR: ABNORMAL
PLATELET # BLD AUTO: 234 K/UL (ref 150–450)
PLATELET BLD QL SMEAR: ABNORMAL
PMV BLD AUTO: 10.8 FL (ref 9.2–12.9)
POCT GLUCOSE: 148 MG/DL (ref 70–110)
POCT GLUCOSE: 151 MG/DL (ref 70–110)
POCT GLUCOSE: 166 MG/DL (ref 70–110)
POCT GLUCOSE: 215 MG/DL (ref 70–110)
POLYCHROMASIA BLD QL SMEAR: ABNORMAL
POTASSIUM SERPL-SCNC: 5.1 MMOL/L (ref 3.5–5.1)
PROT SERPL-MCNC: 4.9 G/DL (ref 6–8.4)
RBC # BLD AUTO: 3.67 M/UL (ref 4.6–6.2)
SODIUM SERPL-SCNC: 139 MMOL/L (ref 136–145)
TACROLIMUS BLD-MCNC: 6.6 NG/ML (ref 5–15)
WBC # BLD AUTO: 2.52 K/UL (ref 3.9–12.7)

## 2025-03-11 PROCEDURE — 36415 COLL VENOUS BLD VENIPUNCTURE: CPT | Performed by: EMERGENCY MEDICINE

## 2025-03-11 PROCEDURE — 63600175 PHARM REV CODE 636 W HCPCS: Performed by: FAMILY MEDICINE

## 2025-03-11 PROCEDURE — 97530 THERAPEUTIC ACTIVITIES: CPT

## 2025-03-11 PROCEDURE — 80053 COMPREHEN METABOLIC PANEL: CPT | Performed by: INTERNAL MEDICINE

## 2025-03-11 PROCEDURE — 97110 THERAPEUTIC EXERCISES: CPT

## 2025-03-11 PROCEDURE — 21400001 HC TELEMETRY ROOM

## 2025-03-11 PROCEDURE — 25000003 PHARM REV CODE 250: Performed by: FAMILY MEDICINE

## 2025-03-11 PROCEDURE — 83605 ASSAY OF LACTIC ACID: CPT | Performed by: EMERGENCY MEDICINE

## 2025-03-11 PROCEDURE — 99232 SBSQ HOSP IP/OBS MODERATE 35: CPT | Mod: ,,, | Performed by: INTERNAL MEDICINE

## 2025-03-11 PROCEDURE — 63600175 PHARM REV CODE 636 W HCPCS: Performed by: INTERNAL MEDICINE

## 2025-03-11 PROCEDURE — 36415 COLL VENOUS BLD VENIPUNCTURE: CPT | Mod: XB | Performed by: INTERNAL MEDICINE

## 2025-03-11 PROCEDURE — 25000003 PHARM REV CODE 250: Performed by: NURSE PRACTITIONER

## 2025-03-11 PROCEDURE — 85027 COMPLETE CBC AUTOMATED: CPT | Performed by: NURSE PRACTITIONER

## 2025-03-11 PROCEDURE — 25000003 PHARM REV CODE 250: Performed by: INTERNAL MEDICINE

## 2025-03-11 PROCEDURE — 80197 ASSAY OF TACROLIMUS: CPT | Performed by: INTERNAL MEDICINE

## 2025-03-11 PROCEDURE — 27000207 HC ISOLATION

## 2025-03-11 PROCEDURE — 85007 BL SMEAR W/DIFF WBC COUNT: CPT | Performed by: NURSE PRACTITIONER

## 2025-03-11 RX ORDER — VANCOMYCIN HYDROCHLORIDE 125 MG/1
125 CAPSULE ORAL 4 TIMES DAILY
Qty: 32 CAPSULE | Refills: 0 | Status: ON HOLD | OUTPATIENT
Start: 2025-03-11 | End: 2025-03-20 | Stop reason: HOSPADM

## 2025-03-11 RX ORDER — FUROSEMIDE 40 MG/1
40 TABLET ORAL DAILY PRN
Status: ON HOLD
Start: 2025-03-11 | End: 2025-03-20

## 2025-03-11 RX ORDER — INSULIN GLARGINE 100 [IU]/ML
15 INJECTION, SOLUTION SUBCUTANEOUS 2 TIMES DAILY
Start: 2025-03-11

## 2025-03-11 RX ORDER — CHOLESTYRAMINE 4 G/9G
1 POWDER, FOR SUSPENSION ORAL 2 TIMES DAILY
Qty: 18 PACKET | Refills: 0 | Status: SHIPPED | OUTPATIENT
Start: 2025-03-11 | End: 2025-03-20

## 2025-03-11 RX ORDER — SODIUM BICARBONATE 650 MG/1
650 TABLET ORAL 2 TIMES DAILY
Qty: 6 TABLET | Refills: 0 | Status: SHIPPED | OUTPATIENT
Start: 2025-03-11 | End: 2025-03-12

## 2025-03-11 RX ORDER — INSULIN ASPART 100 [IU]/ML
0-10 INJECTION, SOLUTION INTRAVENOUS; SUBCUTANEOUS
Start: 2025-03-11 | End: 2026-03-11

## 2025-03-11 RX ADMIN — KETOCONAZOLE 200 MG: 200 TABLET ORAL at 08:03

## 2025-03-11 RX ADMIN — VANCOMYCIN HYDROCHLORIDE 125 MG: KIT at 12:03

## 2025-03-11 RX ADMIN — CHOLESTYRAMINE 4 G: 4 POWDER, FOR SUSPENSION ORAL at 08:03

## 2025-03-11 RX ADMIN — CALCITRIOL CAPSULES 0.25 MCG 0.25 MCG: 0.25 CAPSULE ORAL at 08:03

## 2025-03-11 RX ADMIN — TACROLIMUS 4 MG: 1 CAPSULE ORAL at 08:03

## 2025-03-11 RX ADMIN — INSULIN GLARGINE 15 UNITS: 100 INJECTION, SOLUTION SUBCUTANEOUS at 08:03

## 2025-03-11 RX ADMIN — CIPROFLOXACIN HYDROCHLORIDE 1 DROP: 3 SOLUTION/ DROPS OPHTHALMIC at 06:03

## 2025-03-11 RX ADMIN — POTASSIUM CHLORIDE 10 MEQ: 750 TABLET, EXTENDED RELEASE ORAL at 08:03

## 2025-03-11 RX ADMIN — SODIUM BICARBONATE 650 MG TABLET 650 MG: at 08:03

## 2025-03-11 RX ADMIN — VANCOMYCIN HYDROCHLORIDE 125 MG: KIT at 05:03

## 2025-03-11 RX ADMIN — INSULIN ASPART 2 UNITS: 100 INJECTION, SOLUTION INTRAVENOUS; SUBCUTANEOUS at 01:03

## 2025-03-11 RX ADMIN — VANCOMYCIN HYDROCHLORIDE 125 MG: KIT at 06:03

## 2025-03-11 RX ADMIN — FERROUS SULFATE TAB 325 MG (65 MG ELEMENTAL FE) 1 EACH: 325 (65 FE) TAB at 08:03

## 2025-03-11 RX ADMIN — INSULIN ASPART 2 UNITS: 100 INJECTION, SOLUTION INTRAVENOUS; SUBCUTANEOUS at 08:03

## 2025-03-11 RX ADMIN — METOPROLOL SUCCINATE 25 MG: 25 TABLET, EXTENDED RELEASE ORAL at 08:03

## 2025-03-11 RX ADMIN — VANCOMYCIN HYDROCHLORIDE 125 MG: KIT at 11:03

## 2025-03-11 RX ADMIN — TACROLIMUS 3 MG: 1 CAPSULE ORAL at 05:03

## 2025-03-11 RX ADMIN — PREDNISONE 5 MG: 5 TABLET ORAL at 08:03

## 2025-03-11 RX ADMIN — INSULIN ASPART 2 UNITS: 100 INJECTION, SOLUTION INTRAVENOUS; SUBCUTANEOUS at 05:03

## 2025-03-11 NOTE — PROGRESS NOTES
[facility]  Nephrology  Progress Note    Patient Name: Mitch Whittaker  MRN: [id number]  Admission Date: 2/26/2025  Hospital Length of Stay: 13 days  Attending Provider: Jaymie Medley MD   Primary Care Physician: Valery Caal MD  Principal Problem:C. difficile colitis  Kidney Transplant status and IS in setting of infection   Primary Nephrologist: Dr. Gonsalez       Subjective:     HPI:   70 yo male s/p kidney transplant in 2015 (dtr was donor) admitted for ANGELITO on CKD with baseline creatinine around 1.5mg/dL. ANGELITO improved to baseline since admission but Nephrology was re-consulted for use of Immunosuppression in setting of newly diagnosed C diff. He was started on po vancomycin.  No new labs today.   He notes frequent stooling for about a week. Today he noted blood in the stool.     March 10 2025  - stools are about the same, has received 4 doses total of po vanc, also on Questran   - notes tenderness to R neck     March 11 2025  - feels better, diarrhea better       Review of patient's allergies indicates:   Allergen Reactions    Lisinopril Other (See Comments)     Other reaction(s):  cough    Actos  [pioglitazone] Other (See Comments)     Other reaction(s): CHF    Metformin Other (See Comments)     Other reaction(s): renal insuff  Other reaction(s): CHF     Current Facility-Administered Medications   Medication Frequency    0.9%  NaCl infusion (for blood administration) Q24H PRN    0.9%  NaCl infusion (for blood administration) Q24H PRN    acetaminophen tablet 650 mg Q6H PRN    amitriptyline tablet 25 mg Nightly PRN    bumetanide injection 1 mg Once PRN    calcitRIOL capsule 0.25 mcg Daily    cholestyramine 4 gram packet 4 g BID    ciprofloxacin HCl 0.3 % ophthalmic solution 1 drop Q6H    dextromethorphan-guaiFENesin  mg/5 ml liquid 5 mL Q4H PRN    dextrose 50% injection 12.5 g PRN    dextrose 50% injection 25 g PRN    ferrous sulfate tablet 1 each Daily    glucagon (human recombinant) injection  1 mg PRN    glucose chewable tablet 16 g PRN    glucose chewable tablet 24 g PRN    hydrALAZINE injection 10 mg Q6H PRN    HYDROcodone-acetaminophen 5-325 mg per tablet 1 tablet Q8H PRN    insulin aspart U-100 pen 0-10 Units QID (AC + HS) PRN    insulin glargine U-100 (Lantus) pen 15 Units BID    ketoconazole tablet 200 mg Daily    metoprolol succinate (TOPROL-XL) 24 hr tablet 25 mg Daily    ondansetron disintegrating tablet 4 mg Q6H PRN    predniSONE tablet 5 mg Daily    sodium bicarbonate tablet 650 mg BID    tacrolimus capsule 4 mg Daily AM    And    tacrolimus capsule 3 mg Daily PM    vancomycin 125 mg/5 mL oral solution 125 mg Q6H           Review of Systems  Objective:     Vital Signs (Most Recent):  Temp: 97.6 °F (36.4 °C) (03/11/25 1638)  Pulse: 75 (03/11/25 1638)  Resp: 18 (03/11/25 1638)  BP: (!) 141/65 (03/11/25 1638)  SpO2: 98 % (03/11/25 1638) Vital Signs (24h Range):  Temp:  [97.6 °F (36.4 °C)-98.3 °F (36.8 °C)] 97.6 °F (36.4 °C)  Pulse:  [73-98] 75  Resp:  [17-19] 18  SpO2:  [95 %-99 %] 98 %  BP: (131-141)/(60-77) 141/65     Weight: 101 kg (222 lb 10.6 oz) (03/11/25 1514)  Body mass index is 30.2 kg/m².  Body surface area is 2.27 meters squared.    I/O last 3 completed shifts:  In: 640 [P.O.:640]  Out: -     Physical Exam  Vitals and nursing note reviewed.   Constitutional:       General: He is not in acute distress.     Appearance: He is obese.   Cardiovascular:      Rate and Rhythm: Normal rate.   Pulmonary:      Effort: Pulmonary effort is normal. No respiratory distress.   Neurological:      Mental Status: He is alert and oriented to person, place, and time. Mental status is at baseline.   Psychiatric:         Mood and Affect: Mood normal.         Significant Labs: current hospital labs  reviewed         Assessment/Plan:     Active Diagnoses:    Diagnosis Date Noted POA    PRINCIPAL PROBLEM:  C. difficile colitis [A04.72] 03/09/2025 Yes    Anemia, chronic disease [D63.8] 03/01/2025 Yes    UTI  (urinary tract infection) [N39.0] 02/27/2025 Yes    Gross hematuria [R31.0] 01/13/2025 Yes    Chronic cough [R05.3] 01/04/2023 Yes    Deep vein thrombosis (DVT) of lower extremity [I82.409] 07/24/2019 Yes    Chronic combined systolic and diastolic congestive heart failure [I50.42] 03/15/2019 Yes    Type II diabetes mellitus with renal manifestations [E11.29]  Yes    Chronic immunosuppression with Prograf, MMF and prednisone [Z29.89] 06/18/2015 Not Applicable     Chronic    Class 3 severe obesity due to excess calories with body mass index (BMI) of 40.0 to 44.9 in adult [E66.813, Z68.41, E66.01]  Not Applicable      Problems Resolved During this Admission:    Diagnosis Date Noted Date Resolved POA    ANGELITO (acute kidney injury) [N17.9] 12/08/2022 03/10/2025 Yes     ANGELITO on CKD 3  - baseline serum creatinine about 1.5 mg/dL, admission creatinine 4.3,  - ANGELITO resolved and stable   - laury sCr was due to low tac level      S/p LRD kidney transplant (daughter) in 2015  - now with C diff  - holding Cellcept due to acute infection (plus WBC is down in 2,000 count) Continue to hold Cellcept at discharge, pt to Cellcept resume per Primary Nephrologist (Dr. Gonsalez)   - continue Prograf but now back on Ketoconazole will resume home dose of 4/3 (was increased to 4/4 on 3/6, however, pt did not receive his ketoconazole at admission, resumed yesterday. Ketoconazole used is to increase Prograf levels)   - continue prednisone 5mg QD  - Prograf level at goal 6.6 (4-7)    Metabolic acidosis  - continue po bicarb at discharge     Hypertension  - monitor blood pressure on current medications      Hypokalemia.    - resolved  - do not discharge home on potassium supplements     Hypomagnesemia  - improved      HFrEF.    - Last echo reports LV ejection fraction about 35%.  He also has grade 1 diastolic function.       Anemia.    - Likely multifactorial.  Monitor hemoglobin.  PRBC transfusion if indicated    Leon Jones,  MD  Nephrology  [facility]

## 2025-03-11 NOTE — ASSESSMENT & PLAN NOTE
Patient's FSGs are controlled on current medication regimen.  Last A1c reviewed-   Lab Results   Component Value Date    HGBA1C 5.7 (H) 12/17/2024     Most recent fingerstick glucose reviewed-   Recent Labs   Lab 03/10/25  2156 03/11/25  0640 03/11/25  1106 03/11/25  1759   POCTGLUCOSE 161* 148* 166* 151*     Current correctional scale  Medium  Maintain anti-hyperglycemic dose as follows-   Antihyperglycemics (From admission, onward)      Start     Stop Route Frequency Ordered    03/08/25 0900  insulin glargine U-100 (Lantus) pen 15 Units         -- SubQ 2 times daily 03/08/25 0742    02/26/25 1551  insulin aspart U-100 pen 0-10 Units         -- SubQ Before meals & nightly PRN 02/26/25 1451          Hold Oral hypoglycemics while patient is in the hospital.  Continue adjusting insulin needs as indicated     3/4/25   Glucose above goal  Optimize long acting 20 units BID    3/5/25  Glucose better controlled today

## 2025-03-11 NOTE — PROGRESS NOTES
[facility] Medicine  Progress Note    Patient Name: Mitch Whittaker  MRN: [id number]  Patient Class: IP- Inpatient   Admission Date: 2/26/2025  Length of Stay: 13 days  Attending Physician: Jaymie Medley MD  Primary Care Provider: Valery Caal MD        Subjective     Principal Problem:C. difficile colitis        HPI:  Patient is a 69-year-old  male with a PMH of CHF, kidney transplant, CAD, DM, DVT who presents to the ED with complaints of weakness.  Patient is a resident at [facility].  He states he was sent here for renal failure.  Patient does report still producing urine however it is decreased.  Does complain of some dysuria.  Reports that oral intake is good.  Denies any fever or chills.  No other issues reported at this time.    In the ED, H&H 8.0/27, K:  2.9, BUN/CR 60/4.3.  ED discussed with Dr. Gonsalez who recommended fluid challenge due to concern with over-diuresis.        Overview/Hospital Course:  02/27/2025  Will start empiric Rocephin for UTI.  Creatinine stable.  Continue IVF.  Nephrology on case.  Patient may need renal biopsy.  02/28/2025  Creatinine trending down.  Will continue IVF.  Gross hematuria improving.  Continue Rocephin for UTI.  3/1 creatinine continues to improve. Discontinue intravenous fluids per nephrology. Discontinue gan per nephrology, monitor for urinary retention. Discussed blood transfusion with nephrology and patient also agreeable. No transfusion reaction in the past.  3/2 urology consulted. hematuria improving. Denies abdominal pain. Complains of weakness and cough and leg swelling. Physical/occupational therapy consulted. Creatinine improving  3/3: creatinine continues to improve. Entresto and diuretics remains on hold. Episode of hematuria with clots overnight, but resolved this AM. Outpatient follow up with Urology for cystoscopy. Continue Rocephin for UTI  3/4: Creatine has returned to baseline. He has completed Rocephin  for UTI. Intermittent episodes of hematuria that began last night. Patient is urinating with difficulty. Per urology still recommends outpatient follow up. Awaiting decision for SNF versus HH.   3/5/25: patient and family are agreeable to SNF. Urine dark yellow today. Hemoglobin still below goal of 8g/dL. Will transfuse additional unit today and give Bumex post transfusion.   3/6/25: Awaiting placement at [facility]  3/7/25: facility is waiting on financial documents. Family will turn in this weekend or will discharge home with HH.  3/8/25: Financial documents faxed over. Awaiting placement.   3/9/25: complaints of diarrhea over the weekend. C. Diff positive. Oral Vancomycin started. Hold Cellcept  3/10/25: pt had 6 diarrhea overnight- cont oral Vanco and cholestyramine   3/11/25: diarrhea improving. Pt unable to provide SNF with financials. CM recommended home with home health. Hospital bed and equipment to be arranged     Interval History: VSS, afebrile, WBC normal. Diarrhea improving. Denies abdominal pain. Good appetite. Plan for home with HH in am     Review of Systems   Constitutional:  Positive for activity change and fatigue. Negative for appetite change and fever.   Respiratory:  Positive for cough.    Cardiovascular:  Positive for leg swelling.   Gastrointestinal:  Positive for diarrhea. Negative for abdominal pain.   Genitourinary:  Negative for difficulty urinating, dysuria and hematuria.   Musculoskeletal:  Positive for arthralgias.   Allergic/Immunologic: Positive for immunocompromised state.   Neurological:  Positive for weakness.   Psychiatric/Behavioral:  Negative for agitation, behavioral problems, confusion, decreased concentration and dysphoric mood.        Objective:     Vital Signs (Most Recent):  Temp: 97.6 °F (36.4 °C) (03/11/25 1638)  Pulse: 75 (03/11/25 1638)  Resp: 18 (03/11/25 1638)  BP: (!) 141/65 (03/11/25 1638)  SpO2: 98 % (03/11/25 1638) Vital Signs (24h Range):  Temp:  [97.6 °F  (36.4 °C)-98.3 °F (36.8 °C)] 97.6 °F (36.4 °C)  Pulse:  [73-98] 75  Resp:  [17-19] 18  SpO2:  [95 %-99 %] 98 %  BP: (131-141)/(60-77) 141/65     Weight: 101 kg (222 lb 10.6 oz)  Body mass index is 30.2 kg/m².    Intake/Output Summary (Last 24 hours) at 3/11/2025 1815  Last data filed at 3/11/2025 0850  Gross per 24 hour   Intake 640 ml   Output 100 ml   Net 540 ml         Physical Exam  Vitals and nursing note reviewed.   Constitutional:       General: He is not in acute distress.     Appearance: He is ill-appearing. He is not toxic-appearing.   HENT:      Head: Normocephalic and atraumatic.   Cardiovascular:      Rate and Rhythm: Normal rate.   Pulmonary:      Effort: Pulmonary effort is normal. No respiratory distress.   Abdominal:      Palpations: Abdomen is soft.      Tenderness: There is no abdominal tenderness.   Musculoskeletal:      Right lower leg: Edema present.      Left lower leg: Edema present.   Skin:     General: Skin is warm.   Neurological:      Mental Status: He is alert and oriented to person, place, and time.      Motor: Weakness present.           Significant Labs: All pertinent labs within the past 24 hours have been reviewed.  CBC:   Recent Labs   Lab 03/10/25  0725 03/11/25  0450   WBC 2.77* 2.52*   HGB 9.3* 9.3*   HCT 33.2* 31.6*    234     CMP:   Recent Labs   Lab 03/10/25  0725 03/11/25  0450    139   K 4.5 5.1   * 113*   CO2 20* 20*    160*   BUN 29* 30*   CREATININE 1.6* 1.6*   CALCIUM 8.9 8.9   PROT 4.8* 4.9*   ALBUMIN 2.1* 2.1*   BILITOT 0.4 0.4   ALKPHOS 79 78   AST 10 11   ALT 12 12   ANIONGAP 7* 6*       Significant Imaging: I have reviewed all pertinent imaging results/findings within the past 24 hours.      Assessment & Plan  C. difficile colitis  Hold Cellcept per Nephrology recommendations  --Oral Vancomycin  --Questrain  --ID consulted    3/11/25: discussed C diff with ID who recommended 10 day course of po Vanco and f/u with ID.   Chronic  "immunosuppression with Prograf, MMF and prednisone  Will hold CellCept for now Continue Prograf   Prograf levels 3.4  Continue prednisone    3/9/25  Hold Cellcept due to C. Diff    Class 3 severe obesity due to excess calories with body mass index (BMI) of 40.0 to 44.9 in adult  Body mass index is 30.2 kg/m². Morbid obesity complicates all aspects of disease management from diagnostic modalities to treatment. Weight loss encouraged and health benefits explained to patient.   Physical/occupational therapy     Type II diabetes mellitus with renal manifestations  Patient's FSGs are controlled on current medication regimen.  Last A1c reviewed-   Lab Results   Component Value Date    HGBA1C 5.7 (H) 12/17/2024     Most recent fingerstick glucose reviewed-   Recent Labs   Lab 03/10/25  2156 03/11/25  0640 03/11/25  1106 03/11/25  1759   POCTGLUCOSE 161* 148* 166* 151*     Current correctional scale  Medium  Maintain anti-hyperglycemic dose as follows-   Antihyperglycemics (From admission, onward)      Start     Stop Route Frequency Ordered    03/08/25 0900  insulin glargine U-100 (Lantus) pen 15 Units         -- SubQ 2 times daily 03/08/25 0742    02/26/25 1551  insulin aspart U-100 pen 0-10 Units         -- SubQ Before meals & nightly PRN 02/26/25 1451          Hold Oral hypoglycemics while patient is in the hospital.  Continue adjusting insulin needs as indicated     3/4/25   Glucose above goal  Optimize long acting 20 units BID    3/5/25  Glucose better controlled today  Chronic combined systolic and diastolic congestive heart failure  Patient has Combined Systolic and Diastolic heart failure that is Chronic. On presentation their CHF was well compensated. Most recent BNP and echo results are listed below.  No results for input(s): "BNP" in the last 72 hours.    Latest ECHO  Results for orders placed during the hospital encounter of 11/26/24    Echo    Interpretation Summary    Left Ventricle: The left ventricle is " normal in size. Normal wall thickness. There is concentric hypertrophy. Normal wall motion. Septal motion is consistent with bundle branch block. There is moderately reduced systolic function. Ejection fraction is approximately 35%. Grade I diastolic dysfunction.    Right Ventricle: Normal right ventricular cavity size. Wall thickness is normal. Systolic function is normal.    IVC/SVC: Normal venous pressure at 3 mmHg.    Current Heart Failure Medications  metoprolol succinate (TOPROL-XL) 24 hr tablet 25 mg, Daily, Oral  hydrALAZINE injection 10 mg, Every 6 hours PRN, Intravenous  bumetanide injection 1 mg, Once as needed, Intravenous  furosemide (LASIX) tablet, Daily PRN, Oral    Plan  - Monitor strict I&Os and daily weights.    - Place on telemetry  - Low sodium diet  - Place on fluid restriction of 1.5 L.   - Cardiology has not been consulted  - The patient's volume status is at their baseline  - patient appears euvolemic    One time bumex after blood transfusion    3/6/25  Stable.    Deep vein thrombosis (DVT) of lower extremity  Due to hematuria   Will hold anticoagulation for now  Hb/hct in am       3/5/25  No further episodes of hematuria. Will likely restart anticoagulation after Urology visit  Chronic cough  Follows pulmonology outpatient  Former smoker  Complains of dry cough but no dyspnea  Schedule breathing treatments and monitor response    Gross hematuria  Possibly related to Gan insertion   Will continue to monitor   should hematuria persists will plan to consult Urology on case  Hematuria improving  Discontinue gan  Agreeable to blood transfusion  Urology note reviewed  Continue intravenous antibiotic(s)   Will need outpatient urology for cystoscopy     3/4/25  Intermittent episodes of hematuria.   No indication for further workup IP if no urinary retention per Urology.   Will follow up outpatient    3/5/25  No episodes of hematuria today  Will plan for Urology OP visit once  discharged      3/7/25  Appt scheduled for Dr. Camilo on 3/17/25 at 1100 on [place]. He will hold anticoagulation until then  UTI (urinary tract infection)  Continue Rocephin.   Completed  3//3/25      Anemia, chronic disease  Anemia is likely due to acute blood loss which was from hematuria and chronic disease due to Chronic Kidney Disease. Most recent hemoglobin and hematocrit are listed below.  Recent Labs     03/09/25  1612 03/10/25  0725 03/11/25  0450   HGB 9.1* 9.3* 9.3*   HCT 32.7* 33.2* 31.6*     Plan  - Monitor serial CBC: Daily  - Transfuse PRBC if patient becomes hemodynamically unstable, symptomatic or H/H drops below 7/21.  - Patient has received 1 units of PRBCs on 3/1/25  - Patient's anemia is currently stable  - cbc in am    3/5/25  No more episodes of hematuria. Will give additional unit of blood today to reach goal of 8g/dL    3/7/25  Stable    VTE Risk Mitigation (From admission, onward)           Ordered     Place sequential compression device  Until discontinued         02/26/25 1607                    Discharge Planning   GRETEL: 3/11/2025     Code Status: Full Code   Medical Readiness for Discharge Date: 3/11/2025  Discharge Plan A: Skilled Nursing Facility                Please place Justification for DME        Emma Balbuena NP  Department of Hospital Medicine   [facility] - Med S[facility]

## 2025-03-11 NOTE — SUBJECTIVE & OBJECTIVE
Interval History: VSS, afebrile, WBC normal. Diarrhea improving. Denies abdominal pain. Good appetite. Plan for home with HH in am     Review of Systems   Constitutional:  Positive for activity change and fatigue. Negative for appetite change and fever.   Respiratory:  Positive for cough.    Cardiovascular:  Positive for leg swelling.   Gastrointestinal:  Positive for diarrhea. Negative for abdominal pain.   Genitourinary:  Negative for difficulty urinating, dysuria and hematuria.   Musculoskeletal:  Positive for arthralgias.   Allergic/Immunologic: Positive for immunocompromised state.   Neurological:  Positive for weakness.   Psychiatric/Behavioral:  Negative for agitation, behavioral problems, confusion, decreased concentration and dysphoric mood.        Objective:     Vital Signs (Most Recent):  Temp: 97.6 °F (36.4 °C) (03/11/25 1638)  Pulse: 75 (03/11/25 1638)  Resp: 18 (03/11/25 1638)  BP: (!) 141/65 (03/11/25 1638)  SpO2: 98 % (03/11/25 1638) Vital Signs (24h Range):  Temp:  [97.6 °F (36.4 °C)-98.3 °F (36.8 °C)] 97.6 °F (36.4 °C)  Pulse:  [73-98] 75  Resp:  [17-19] 18  SpO2:  [95 %-99 %] 98 %  BP: (131-141)/(60-77) 141/65     Weight: 101 kg (222 lb 10.6 oz)  Body mass index is 30.2 kg/m².    Intake/Output Summary (Last 24 hours) at 3/11/2025 1815  Last data filed at 3/11/2025 0850  Gross per 24 hour   Intake 640 ml   Output 100 ml   Net 540 ml         Physical Exam  Vitals and nursing note reviewed.   Constitutional:       General: He is not in acute distress.     Appearance: He is ill-appearing. He is not toxic-appearing.   HENT:      Head: Normocephalic and atraumatic.   Cardiovascular:      Rate and Rhythm: Normal rate.   Pulmonary:      Effort: Pulmonary effort is normal. No respiratory distress.   Abdominal:      Palpations: Abdomen is soft.      Tenderness: There is no abdominal tenderness.   Musculoskeletal:      Right lower leg: Edema present.      Left lower leg: Edema present.   Skin:     General:  Skin is warm.   Neurological:      Mental Status: He is alert and oriented to person, place, and time.      Motor: Weakness present.           Significant Labs: All pertinent labs within the past 24 hours have been reviewed.  CBC:   Recent Labs   Lab 03/10/25  0725 03/11/25  0450   WBC 2.77* 2.52*   HGB 9.3* 9.3*   HCT 33.2* 31.6*    234     CMP:   Recent Labs   Lab 03/10/25  0725 03/11/25  0450    139   K 4.5 5.1   * 113*   CO2 20* 20*    160*   BUN 29* 30*   CREATININE 1.6* 1.6*   CALCIUM 8.9 8.9   PROT 4.8* 4.9*   ALBUMIN 2.1* 2.1*   BILITOT 0.4 0.4   ALKPHOS 79 78   AST 10 11   ALT 12 12   ANIONGAP 7* 6*       Significant Imaging: I have reviewed all pertinent imaging results/findings within the past 24 hours.

## 2025-03-11 NOTE — PT/OT/SLP PROGRESS
"Physical Therapy  Treatment    Mitch Whittaker   MRN: [id number]   Admitting Diagnosis: C. difficile colitis    PT Received On: 03/11/25  PT Start Time: 0940     PT Stop Time: 1015    PT Total Time (min): 35 min       Billable Minutes:  Therapeutic Activity 20 and Therapeutic Exercise 15    Treatment Type: Treatment  PT/PTA: PT     Number of PTA visits since last PT visit: 0       General Precautions: Standard, fall  Orthopedic Precautions: N/A  Braces: N/A  Respiratory Status: Room air         Subjective:  Communicated with NURSE [place] AND EPIC CHART REVIEW  prior to session.   PT AGREED TO TX     Pain/Comfort  Pain Rating 1: 5/10  Location - Side 1: Bilateral  Location 1: leg  Pain Rating Post-Intervention 1: 5/10    Objective:   Patient found with: peripheral IV, telemetry    Functional Mobility:  PT MET IN RM SUP IN BED. PT COMPLETED AP,HIP ADD/ABD X 10 REPS. PT THEN REPORTED HE WAS SOILED. P.T. CALLED FOR CLEANING. P.T. RETURNED FOR TX. PT LOG ROLLED TO LEFT AND SEATED EOB WITH CGA. PT SCOOTED TO EOB WITH MIN A. PT COMPLETED 2X5 REPS OF BED RAIL PUSHUPS WITH INC CUES FOR ANT WT SHIFT. PT UNABLE TO CLEAR BOTTOM FROM BED. PT COMPLETED 15 REPS OF TKE B LE WITH AAROM TO L LE > AROM WITH INC PAIN NOTED. PT PROPPED WITH PILLOW FOR UPRIGHT TOLERANCE. PT LEFT SEATED WITH ALL NEEDS MET AND CALL BELL IN REACH.     Treatment and Education:  PT EDUCATED ON "CALL DON'T FALL", ENCOURAGED TO CALL FOR ASSISTANCE WITH ALL NEEDS FOR OOB MOBILITY.       AM-PAC 6 CLICK MOBILITY  How much help from another person does this patient currently need?   1 = Unable, Total/Dependent Assistance  2 = A lot, Maximum/Moderate Assistance  3 = A little, Minimum/Contact Guard/Supervision  4 = None, Modified [place]/Independent    Turning over in bed (including adjusting bedclothes, sheets and blankets)?: 3  Sitting down on and standing up from a chair with arms (e.g., wheelchair, bedside commode, etc.): 1  Moving from lying on back to sitting on " the side of the bed?: 3  Moving to and from a bed to a chair (including a wheelchair)?: 1  Need to walk in hospital room?: 1  Climbing 3-5 steps with a railing?: 1  Basic Mobility Total Score: 10    AM-PAC Raw Score CMS G-Code Modifier Level of Impairment Assistance   6 % Total / Unable   7 - 9 CM 80 - 100% Maximal Assist   10 - 14 CL 60 - 80% Moderate Assist   15 - 19 CK 40 - 60% Moderate Assist   20 - 22 CJ 20 - 40% Minimal Assist   23 CI 1-20% SBA / CGA   24 CH 0% Independent/ Mod I     Patient left sitting edge of bed with call button in reach and bed alarm on.    Assessment:  PT RADHA TX WITH INC FATIGUE.     Rehab identified problem list/impairments: weakness, impaired endurance, impaired self care skills, impaired functional mobility, gait instability, impaired balance, pain, decreased safety awareness, decreased lower extremity function, decreased upper extremity function, impaired coordination, decreased ROM, impaired skin    Rehab potential is fair.    Activity tolerance: Fair    Discharge recommendations: Moderate Intensity Therapy      Barriers to discharge:      Equipment recommendations: none     GOALS:   Multidisciplinary Problems       Physical Therapy Goals          Problem: Physical Therapy    Goal Priority Disciplines Outcome Interventions   Physical Therapy Goal     PT, PT/OT Progressing    Description: LTG: 3/16/25  1. PT WILL LOG ROLL IN BED WITH MIN A  2. PT WILL SIT EOB WITH MOD A FOR SITTING TOLERANCE  3. PT WILL COMPLETE B LE TE X 10 REPS TO INC ROM AND STRENGTHENING  4. PT WILL INC AMPAC SCORE BY 2 POINTS TO PROGRESS GROSS FUNC MOBILITY.                          DME Justifications:  No DME recommended requiring DME justifications    PLAN:    Patient to be seen 3 x/week to address the above listed problems via therapeutic activities, therapeutic exercises  Plan of Care expires: 03/16/25  Plan of Care reviewed with: patient         03/11/2025

## 2025-03-11 NOTE — CARE UPDATE
Discussed with pt's daughter that [facility] still does not have financials. Daughter left to go to [facility]. Attempted to call spouse with no answer.   Called daughter again at 1310 and informed her that if financials are not complete by 3pm, we ill place discharge orders for home with home health.

## 2025-03-11 NOTE — PROGRESS NOTES
Mitch requires a commode for home use because he is confined to a single room.   Mitch requires the head of the bed to be elevated more than 30 degrees most of the time due to CHF, Chromic pulmonary disease, or problems with aspiration.The positioning of the body cannot be sufficiently resolved by the use of pillows and wedges.  Mitch Whittaker has a mobility limitation that significantly impairs his ability to participate in one or more mobility related activities of daily living (MRADLs) such as toileting, feeding, dressing, grooming, and bathing in customary locations in the home.  The mobility limitation cannot be sufficiently resolved by the use of a cane or walker.   The use of a manual wheelchair will significantly improve the patients ability to participate in MRADLS and the patient will use it on regular basis in the home.  Mitch Whittaker has expressed his willingness to use a manual wheelchair in the home. Patients upper body strength is sufficient for propulsion.  He also has a caregiver who is available, willing, and able to provide assistance with the wheelchair when needed.

## 2025-03-11 NOTE — TELEPHONE ENCOUNTER
----- Message from Andrew Cosyb, PharmD sent at 2/21/2017  4:47 PM CST -----  Pt is requesting that his toprol xl 25 mg be sent in with a quantity of 60, so that the rx will last him a month.     Thanks,    Andrew   Vicki Guzman (Physician Assistant)

## 2025-03-11 NOTE — ASSESSMENT & PLAN NOTE
Hold Cellcept per Nephrology recommendations  --Oral Vancomycin  --Questrain  --ID consulted    3/11/25: discussed C diff with ID who recommended 10 day course of po Vanco and f/u with ID.

## 2025-03-11 NOTE — ASSESSMENT & PLAN NOTE
"Patient has Combined Systolic and Diastolic heart failure that is Chronic. On presentation their CHF was well compensated. Most recent BNP and echo results are listed below.  No results for input(s): "BNP" in the last 72 hours.    Latest ECHO  Results for orders placed during the hospital encounter of 11/26/24    Echo    Interpretation Summary    Left Ventricle: The left ventricle is normal in size. Normal wall thickness. There is concentric hypertrophy. Normal wall motion. Septal motion is consistent with bundle branch block. There is moderately reduced systolic function. Ejection fraction is approximately 35%. Grade I diastolic dysfunction.    Right Ventricle: Normal right ventricular cavity size. Wall thickness is normal. Systolic function is normal.    IVC/SVC: Normal venous pressure at 3 mmHg.    Current Heart Failure Medications  metoprolol succinate (TOPROL-XL) 24 hr tablet 25 mg, Daily, Oral  hydrALAZINE injection 10 mg, Every 6 hours PRN, Intravenous  bumetanide injection 1 mg, Once as needed, Intravenous  furosemide (LASIX) tablet, Daily PRN, Oral    Plan  - Monitor strict I&Os and daily weights.    - Place on telemetry  - Low sodium diet  - Place on fluid restriction of 1.5 L.   - Cardiology has not been consulted  - The patient's volume status is at their baseline  - patient appears euvolemic    One time bumex after blood transfusion    3/6/25  Stable.    "

## 2025-03-11 NOTE — PT/OT/SLP PROGRESS
Occupational Therapy   Treatment    Name: Mitch Whittaker  MRN: [id number]  Admitting Diagnosis:  C. difficile colitis       Recommendations:     Discharge Recommendations: Moderate Intensity Therapy  Discharge Equipment Recommendations:  to be determined by next level of care  Barriers to discharge:  None    Assessment:     Mitch Whittaker is a 69 y.o. male with a medical diagnosis of C. difficile colitis.  He presents with the following performance deficits affecting function are weakness, impaired endurance, impaired self care skills, impaired functional mobility, gait instability, impaired balance, decreased coordination, decreased upper extremity function, decreased lower extremity function, decreased safety awareness, pain, decreased ROM, impaired skin.     Rehab Prognosis:  Fair; patient would benefit from acute skilled OT services to address these deficits and reach maximum level of function.       Plan:     Patient to be seen 2 x/week to address the above listed problems via self-care/home management, therapeutic activities, therapeutic exercises  Plan of Care Expires: 03/18/25  Plan of Care Reviewed with: patient    Subjective     Chief Complaint: weakness, BLE pain  Patient/Family Comments/goals: get better; improve strength and mobility  Pain/Comfort:  Pain Rating 1: 5/10  Location - Side 1: Bilateral  Location - Orientation 1: generalized  Location 1: leg  Pain Addressed 1: Reposition, Distraction  Pain Rating Post-Intervention 1: 5/10    Objective:     Communicated with: Nurse and epic chart review prior to session.  Patient found supine with peripheral IV, telemetry upon OT entry to room.    General Precautions: Standard, fall, special contact    Orthopedic Precautions:N/A  Braces: N/A  Respiratory Status: Room air     Occupational Performance:     Bed Mobility:    Patient completed Rolling/Turning to Left with  contact guard assistance  Patient completed Supine to Sit with contact guard assistance    Forward scoot to EOB with Min A.    Activities of Daily Living:  Grooming: setup A. Pt performed oral care while sitting EOB  Lower Body Dressing: total assistance gauri socks    [facility] 6 Click ADL: 14    Treatment & Education:  Pt performed x15 reps BUE AROM therex while sitting EOB:  Shoulder flexion  Elbow flexion/ext (weighted)  Wrist flexion/ext  Digit flexion/ext  Pt also completed 2 x 5 reps of push ups using bed rails; pt unable to clear bottom from bed. Required consistent verbal cueing for anterior weight shift.  Reviewed role of OT in acute setting and benefits of participation. Educated on importance of EOB activity and calling for A to meet needs. Encouraged completion of B UE AROM therex throughout the day to tolerance to increase functional strength and activity tolerance. Educated patient on importance of increased tolerance to upright position and direct impact on CV endurance and strength. Patient encouraged to sit up EOB for for a minimum of 2 consecutive hours per day. Patient stated understanding and in agreement with POC.     Patient left sitting edge of bed supported with cushions with all lines intact, call button in reach, and bed alarm on    GOALS:   Multidisciplinary Problems       Occupational Therapy Goals          Problem: Occupational Therapy    Goal Priority Disciplines Outcome Interventions   Occupational Therapy Goal     OT, PT/OT Progressing    Description: O.T. GOALS TO BE MET BY 3-18-25    PT WILL TOLERATE 1 SET X 15 REPS B UE ROM EXERCISE  MOD (I) WITH SUPINE<>SIT TRANSFERS  MAX A WITH BSC TRANSFERS  TOILETING ON BSC WITH MOD A.                       Time Tracking:     OT Date of Treatment: 03/11/25  OT Start Time: 0945  OT Stop Time: 1010  OT Total Time (min): 25 min    Billable Minutes:Therapeutic Activity 15  Therapeutic Exercise 10    OT/ANNIA: OT     Number of ANNIA visits since last OT visit: 0  Tena Miranda OT     3/11/2025

## 2025-03-11 NOTE — PROGRESS NOTES
[facility] - Med Surg  Adult Nutrition  Progress Note    SUMMARY       Recommendations    Recommendation/Intervention:   1. Recommend modify pt's diet to a GI soft diet   2. Recommend pt continues Hari BID as warranted for wound healing   3. Recommend Banatrol TID to assist with diarrhea or BID for loose stools   4. Weigh twice weekly    Goals:   1. Pt's diet will be modified prior to RD follow up   2. Pt will continue to tolerate and consume >75% EEN and EPN prior to RD follow up   3. Pt will continue to tolerate and consume Hari BID if warranted prior to RD follow up   4. Pt's diarrhea/loose stools will improve prior to RD follow up  Nutrition Goal Status: new  Communication of RD Recs: other (comment) (Progress, POC, sticky note)    Nutrition Discharge Planning    Nutrition Discharge Planning: Therapeutic diet (comments), Oral supplement regimen (comments)  Therapeutic diet (comments): GI soft diet  Oral supplement regimen (comments): Banatrol as warranted    Assessment and Plan    Nutrition Problem  Altered GI function     Related to (etiology):   Alteration in GI tract function and/or structure     Signs and Symptoms (as evidenced by):   Diarrhea/Loose stools    Interventions/Recommendations (treatment strategy):  1. Soft, easily digestible modified diet  2. Commercial food medical food supplement therapy  3. Collaboration by nutrition professional with other providers     Nutrition Diagnosis Status:   New       Malnutrition Assessment     Skin (Micronutrient): edema, wounds unhealed (Dewayne score = 11 (high risk)       Weight Loss (Malnutrition): greater than 7.5% in 3 months                         Reason for Assessment    Reason For Assessment: RD follow-up  Diagnosis:  (C. difficile colitis)  General Information Comments:   3/11/25: 69 y.o. Male admitted for C. difficile colitis. PMH: Class 3 severe obesity, Chronic immunosuppression, T2DM w/ renal manifestations, Chronic combined CHF, DVT lower extremity,  Chronic cough, Gross hematuria, UTI, Chronic disease anemia; Hx: Kidney transplant. Pt is currently on isolation, a Diabetic 2000 calorie diet and Hari BID. RD follow up. EMR noted that the pt had 6 BM's overnight, has a good appetite, negative for N/V and abd pain. Spoke to RN via secure chat, pt drank Hari yesterday but refused today. Per chart: 100% PO intake; LBM 3/11 (green, loose, watery, seedy); 2+ mild bilateral leg; PI R heel, detials per Wound care 25. Labs, meds, weights reviewed. Weight charted 12/10/24 268 lbs, 3/10/25 222 lbs (BMI 30.20, Obese), -46 lb wt loss (17% wt change) x 3 months, significant wt loss. Unable to perform NFPE at this time d/t pt's isolation status, will perform at follow up when appropriate. RD will continue to follow and monitor pt's nutritional status during admit.    Nutrition/Diet History    Spiritual, Cultural Beliefs, Sikh Practices, Values that Affect Care: no  Food Allergies: NKFA  Factors Affecting Nutritional Intake: None identified at this time    Anthropometrics    Height: 6' (182.9 cm)  Height (inches): 72 in  Height Method: Stated  Weight: 101 kg (222 lb 10.6 oz)  Weight (lb): 222.67 lb  Weight Method: Bed Scale  Ideal Body Weight (IBW), Male: 178 lb  % Ideal Body Weight, Male (lb): 125.1 %  BMI (Calculated): 30.2  BMI Grade: 30 - 34.9- obesity - grade I  Usual Body Weight (UBW), k.8 kg  Weight Change Amount: 46 lb  % Usual Body Weight: 83.1  % Weight Change From Usual Weight: -17.08 %       Wt Readings from Last 15 Encounters:   25 101 kg (222 lb 10.6 oz)   25 130.5 kg (287 lb 11.2 oz)   24 120.7 kg (265 lb 15.8 oz)   24 123 kg (271 lb 2.7 oz)   12/10/24 122 kg (268 lb 15.4 oz)   24 120.7 kg (266 lb)   11/15/24 125.1 kg (275 lb 12.7 oz)   24 125.1 kg (275 lb 12.7 oz)   11/10/24 126.6 kg (279 lb 1.6 oz)   24 126.6 kg (279 lb 1.6 oz)   10/04/24 126.4 kg (278 lb 10.6 oz)   24 125 kg (275 lb 9.2 oz)    08/30/24 125.8 kg (277 lb 5.4 oz)   08/28/24 125.4 kg (276 lb 5.6 oz)   08/26/24 123.7 kg (272 lb 11.3 oz)     Lab/Procedures/Meds    Pertinent Labs Reviewed: reviewed  Pertinent Medications Reviewed: reviewed    BMP  Lab Results   Component Value Date     03/11/2025    K 5.1 03/11/2025     (H) 03/11/2025    CO2 20 (L) 03/11/2025    BUN 30 (H) 03/11/2025    CREATININE 1.6 (H) 03/11/2025    CALCIUM 8.9 03/11/2025    ANIONGAP 6 (L) 03/11/2025    EGFRNORACEVR 46 (A) 03/11/2025     Lab Results   Component Value Date    CALCIUM 8.9 03/11/2025    PHOS 2.9 03/10/2025     Lab Results   Component Value Date    ALBUMIN 2.1 (L) 03/11/2025     Lab Results   Component Value Date    ALT 12 03/11/2025    AST 11 03/11/2025    ALKPHOS 78 03/11/2025    BILITOT 0.4 03/11/2025     Recent Labs   Lab 03/11/25  1106   POCTGLUCOSE 166*     Lab Results   Component Value Date    HGBA1C 5.7 (H) 12/17/2024     Lab Results   Component Value Date    WBC 2.52 (L) 03/11/2025    HGB 9.3 (L) 03/11/2025    HCT 31.6 (L) 03/11/2025    MCV 86 03/11/2025     03/11/2025       Scheduled Meds:   calcitRIOL  0.25 mcg Oral Daily    cholestyramine  1 packet Oral BID    ciprofloxacin HCl  1 drop Both Eyes Q6H    ferrous sulfate  1 tablet Oral Daily    insulin glargine U-100  15 Units Subcutaneous BID    ketoconazole  200 mg Oral Daily    metoprolol succinate  25 mg Oral Daily    predniSONE  5 mg Oral Daily    sodium bicarbonate  650 mg Oral BID    tacrolimus  4 mg Oral Daily AM    And    tacrolimus  3 mg Oral Daily PM    vancomycin  125 mg Oral Q6H     Continuous Infusions:  PRN Meds:.  Current Facility-Administered Medications:     0.9%  NaCl infusion (for blood administration), , Intravenous, Q24H PRN    0.9%  NaCl infusion (for blood administration), , Intravenous, Q24H PRN    acetaminophen, 650 mg, Oral, Q6H PRN    amitriptyline, 25 mg, Oral, Nightly PRN    bumetanide, 1 mg, Intravenous, Once PRN    dextromethorphan-guaiFENesin   mg/5 ml, 5 mL, Oral, Q4H PRN    dextrose 50%, 12.5 g, Intravenous, PRN    dextrose 50%, 25 g, Intravenous, PRN    glucagon (human recombinant), 1 mg, Intramuscular, PRN    glucose, 16 g, Oral, PRN    glucose, 24 g, Oral, PRN    hydrALAZINE, 10 mg, Intravenous, Q6H PRN    HYDROcodone-acetaminophen, 1 tablet, Oral, Q8H PRN    insulin aspart U-100, 0-10 Units, Subcutaneous, QID (AC + HS) PRN    ondansetron, 4 mg, Oral, Q6H PRN      Estimated/Assessed Needs    Weight Used For Calorie Calculations: 101 kg (222 lb 10.6 oz)  Energy Calorie Requirements (kcal): 1803 kcals (MSJ x no AF (GI Disorder, obese)  Energy Need Method: [place]-St Jeor  Protein Requirements:  g (0.8-1.0 g/kg (ANGELITO, no dialysis/ Obese)  Weight Used For Protein Calculations: 101 kg (222 lb 10.6 oz)  Fluid Requirements (mL): 500 mL + total output (ANGELITO, per MD/NP)  Estimated Fluid Requirement Method: other (see comments)  RDA Method (mL): 1803  CHO Requirement: 225 g (1803 kcals/8)      Nutrition Prescription Ordered    Current Diet Order: Diabetic 2000 calorie diet  Oral Nutrition Supplement: Hari BID    Evaluation of Received Nutrient/Fluid Intake  I/O: (Net since admit):  3/11/25: -2757.8 mL    Energy Calories Required: meeting needs  Protein Required: meeting needs  Fluid Required: meeting needs  Total Fluid Intake (mL): 640  Comments: LBM 3/11 (green, loose, watery, seedy)  Tolerance: tolerating  % Intake of Estimated Energy Needs: 75 - 100 %  % Meal Intake: 75 - 100 %    Nutrition Risk    Level of Risk/Frequency of Follow-up: high (F/u x 2 weekly)     Monitor and Evaluation    Monitor and Evaluation: Energy intake, Protein intake, Food and beverage intake, Carbohydrate intake, Diet order, Weight, Electrolyte and renal panel, Gastrointestinal profile, Glucose/endocrine profile, Inflammatory profile, Nutrition focused physical findings, Skin     Nutrition Follow-Up    RD Follow-up?: Yes  Naheed Lewis, BS, RDN, LDN

## 2025-03-11 NOTE — ASSESSMENT & PLAN NOTE
Anemia is likely due to acute blood loss which was from hematuria and chronic disease due to Chronic Kidney Disease. Most recent hemoglobin and hematocrit are listed below.  Recent Labs     03/09/25  1612 03/10/25  0725 03/11/25  0450   HGB 9.1* 9.3* 9.3*   HCT 32.7* 33.2* 31.6*     Plan  - Monitor serial CBC: Daily  - Transfuse PRBC if patient becomes hemodynamically unstable, symptomatic or H/H drops below 7/21.  - Patient has received 1 units of PRBCs on 3/1/25  - Patient's anemia is currently stable  - cbc in am    3/5/25  No more episodes of hematuria. Will give additional unit of blood today to reach goal of 8g/dL    3/7/25  Stable

## 2025-03-11 NOTE — PLAN OF CARE
Nutrition Recommendations/Interventions 3/11/25:   1. Recommend modify pt's diet to a GI soft diet   2. Recommend pt continues Hari BID as warranted for wound healing   3. Recommend Banatrol TID to assist with diarrhea or BID for loose stools   4. Weigh twice weekly  5. Collaboration by nutrition professional with other providers     Goals:   1. Pt's diet will be modified prior to RD follow up   2. Pt will continue to tolerate and consume >75% EEN and EPN prior to RD follow up   3. Pt will continue to tolerate and consume Hari BID if warranted prior to RD follow up   4. Pt's diarrhea/loose stools will improve prior to RD follow up    Naheed Lewis, BS, RDN, LDN

## 2025-03-11 NOTE — PLAN OF CARE
HECTOR spoke with Lelia at [facility] regarding paperwork. Lelia stated pt's family still hasn't provided the needed financials and documents. NP notified that pt can dc home due to none compliance of providing needed paperwork for snf to nh placement. Sw and NP also explained to pt and family that if the provided information wasn't given, pt would end up going home with hh.  1:30pm Lelia from [facility] stated pt's spouse came to speak with her. Pt's wife had some questions about needed documents. Lelia explained the need for documents. Lelia with [facility] stated pt's wife said she would be back to bring documents. SW to f/u before end of day.  3:39pm HECTOR spoke with Lelia at [facility] who stated pt's spouse brought financials with certain parts blacked out. Lelia explained they couldn't take paperwork like that. Spouse left the paperwork for Lelia at the facility. Lelia stated pt's spouse still hasn't turned in needed information. Pt can dc home due to family noncompliance.   4:25pm SW spoke with pt's spouse regarding HH and needed equipment. Pt's spouse stated they would like OH and hospital bed. Plan to dc in the morning. Pending NP to place dme orders. [facility] has accepted pt.

## 2025-03-12 ENCOUNTER — TELEPHONE (OUTPATIENT)
Dept: NEPHROLOGY | Facility: CLINIC | Age: 70
End: 2025-03-12
Payer: COMMERCIAL

## 2025-03-12 VITALS
BODY MASS INDEX: 30.99 KG/M2 | HEIGHT: 72 IN | DIASTOLIC BLOOD PRESSURE: 72 MMHG | RESPIRATION RATE: 18 BRPM | TEMPERATURE: 98 F | WEIGHT: 228.81 LBS | OXYGEN SATURATION: 98 % | SYSTOLIC BLOOD PRESSURE: 152 MMHG | HEART RATE: 92 BPM

## 2025-03-12 LAB — TACROLIMUS BLD-MCNC: 9.7 NG/ML (ref 5–15)

## 2025-03-12 PROCEDURE — 63600175 PHARM REV CODE 636 W HCPCS: Performed by: INTERNAL MEDICINE

## 2025-03-12 PROCEDURE — 63600175 PHARM REV CODE 636 W HCPCS: Performed by: FAMILY MEDICINE

## 2025-03-12 PROCEDURE — 97530 THERAPEUTIC ACTIVITIES: CPT

## 2025-03-12 PROCEDURE — 25000003 PHARM REV CODE 250: Performed by: NURSE PRACTITIONER

## 2025-03-12 PROCEDURE — 63600175 PHARM REV CODE 636 W HCPCS: Performed by: NURSE PRACTITIONER

## 2025-03-12 PROCEDURE — 97110 THERAPEUTIC EXERCISES: CPT

## 2025-03-12 PROCEDURE — 25000003 PHARM REV CODE 250: Performed by: FAMILY MEDICINE

## 2025-03-12 PROCEDURE — 25000003 PHARM REV CODE 250: Performed by: INTERNAL MEDICINE

## 2025-03-12 PROCEDURE — 97530 THERAPEUTIC ACTIVITIES: CPT | Mod: CQ

## 2025-03-12 RX ORDER — SODIUM BICARBONATE 650 MG/1
650 TABLET ORAL 2 TIMES DAILY
Qty: 60 TABLET | Refills: 0 | Status: SHIPPED | OUTPATIENT
Start: 2025-03-12 | End: 2025-04-11

## 2025-03-12 RX ADMIN — CALCITRIOL CAPSULES 0.25 MCG 0.25 MCG: 0.25 CAPSULE ORAL at 08:03

## 2025-03-12 RX ADMIN — FERROUS SULFATE TAB 325 MG (65 MG ELEMENTAL FE) 1 EACH: 325 (65 FE) TAB at 08:03

## 2025-03-12 RX ADMIN — SODIUM BICARBONATE 650 MG TABLET 650 MG: at 08:03

## 2025-03-12 RX ADMIN — PREDNISONE 5 MG: 5 TABLET ORAL at 08:03

## 2025-03-12 RX ADMIN — VANCOMYCIN HYDROCHLORIDE 125 MG: KIT at 01:03

## 2025-03-12 RX ADMIN — TACROLIMUS 4 MG: 1 CAPSULE ORAL at 08:03

## 2025-03-12 RX ADMIN — VANCOMYCIN HYDROCHLORIDE 125 MG: KIT at 12:03

## 2025-03-12 RX ADMIN — VANCOMYCIN HYDROCHLORIDE 125 MG: KIT at 06:03

## 2025-03-12 RX ADMIN — METOPROLOL SUCCINATE 25 MG: 25 TABLET, EXTENDED RELEASE ORAL at 08:03

## 2025-03-12 RX ADMIN — KETOCONAZOLE 200 MG: 200 TABLET ORAL at 08:03

## 2025-03-12 RX ADMIN — INSULIN GLARGINE 15 UNITS: 100 INJECTION, SOLUTION SUBCUTANEOUS at 08:03

## 2025-03-12 RX ADMIN — CIPROFLOXACIN HYDROCHLORIDE 1 DROP: 3 SOLUTION/ DROPS OPHTHALMIC at 01:03

## 2025-03-12 RX ADMIN — CHOLESTYRAMINE 4 G: 4 POWDER, FOR SUSPENSION ORAL at 08:03

## 2025-03-12 NOTE — ASSESSMENT & PLAN NOTE
"Patient has Combined Systolic and Diastolic heart failure that is Chronic. On presentation their CHF was well compensated. Most recent BNP and echo results are listed below.  No results for input(s): "BNP" in the last 72 hours.    Latest ECHO  Results for orders placed during the hospital encounter of 11/26/24    Echo    Interpretation Summary    Left Ventricle: The left ventricle is normal in size. Normal wall thickness. There is concentric hypertrophy. Normal wall motion. Septal motion is consistent with bundle branch block. There is moderately reduced systolic function. Ejection fraction is approximately 35%. Grade I diastolic dysfunction.    Right Ventricle: Normal right ventricular cavity size. Wall thickness is normal. Systolic function is normal.    IVC/SVC: Normal venous pressure at 3 mmHg.    Current Heart Failure Medications  furosemide (LASIX) tablet, Daily PRN, Oral    Plan  - Monitor strict I&Os and daily weights.    - Place on telemetry  - Low sodium diet  - Place on fluid restriction of 1.5 L.   - Cardiology has not been consulted  - The patient's volume status is at their baseline  - patient appears euvolemic    One time bumex after blood transfusion    3/6/25  Stable.    "

## 2025-03-12 NOTE — ASSESSMENT & PLAN NOTE
Body mass index is 31.04 kg/m². Morbid obesity complicates all aspects of disease management from diagnostic modalities to treatment. Weight loss encouraged and health benefits explained to patient.   Physical/occupational therapy

## 2025-03-12 NOTE — PLAN OF CARE
Tac level 9.7, ( yesterday 6.6, previous to this too low 2-3.4) Pt now back on home Prograf 4/3 plus ketoconazole. Primary nephrologist to follow up on repeat tac this week

## 2025-03-12 NOTE — ASSESSMENT & PLAN NOTE
Patient's FSGs are controlled on current medication regimen.  Last A1c reviewed-   Lab Results   Component Value Date    HGBA1C 5.7 (H) 12/17/2024     Most recent fingerstick glucose reviewed-   Recent Labs   Lab 03/11/25 2006   POCTGLUCOSE 215*     Current correctional scale  Medium  Maintain anti-hyperglycemic dose as follows-   Antihyperglycemics (From admission, onward)      None          Hold Oral hypoglycemics while patient is in the hospital.  Continue adjusting insulin needs as indicated     3/4/25   Glucose above goal  Optimize long acting 20 units BID    3/5/25  Glucose better controlled today

## 2025-03-12 NOTE — PLAN OF CARE
[facility] - Med Surg  Discharge Final Note    Primary Care Provider: Valery Caal MD    Expected Discharge Date: 3/12/2025    Final Discharge Note (most recent)       Final Note - 03/12/25 0859          Final Note    Assessment Type Final Discharge Note     Anticipated Discharge Disposition Home-Health Care Svc        Post-Acute Status    Post-Acute Authorization Home Health     Home Health Status Set-up Complete/Auth obtained     Coverage c difficile colitis                               Follow-up providers       Gómez Camilo MD   Specialty: Urology    [address] [id number]   Phone: [phone]       Next Steps: Follow up on 3/17/2025    Gómez Camilo MD   Specialty: Urology    [address]  [place] [address]   Phone: [phone]       Next Steps: Follow up    Instructions: as scheduled    [facility]   Specialty: Cardiology    [address] [address]   Phone: [phone]       Next Steps: Follow up in 1 week(s)    Hany Gonsalez MD   Specialty: Nephrology    [address]  [place] [address]   Phone: [phone]       Next Steps: Follow up in 1 week(s)    Stu Benton MD, [facility]   Specialty: Infectious Diseases, Hospitalist    [address] [address]   Phone: [phone]       Next Steps: Follow up in 2 week(s)              After-discharge care                Destination       *[facility]   Service: Skilled Nursing    [address] [address]   Phone: [phone]                 Home Medical Care       *[facility]   Service: Home Health Services    [address]  [place] [address]   Phone: [phone]                             Pt is going home with [facility]. All dme is being delivered to pt's home. Pt's sliding board was delivered at bedside.

## 2025-03-12 NOTE — ASSESSMENT & PLAN NOTE
Anemia is likely due to acute blood loss which was from hematuria and chronic disease due to Chronic Kidney Disease. Most recent hemoglobin and hematocrit are listed below.  Recent Labs     03/10/25  0725 03/11/25  0450   HGB 9.3* 9.3*   HCT 33.2* 31.6*     Plan  - Monitor serial CBC: Daily  - Transfuse PRBC if patient becomes hemodynamically unstable, symptomatic or H/H drops below 7/21.  - Patient has received 1 units of PRBCs on 3/1/25  - Patient's anemia is currently stable  - cbc in am    3/5/25  No more episodes of hematuria. Will give additional unit of blood today to reach goal of 8g/dL    3/7/25  Stable

## 2025-03-12 NOTE — TELEPHONE ENCOUNTER
Dr. Gonsalez's txp pt. Recent hospital stay for ANGELITO, being discharged home today with HH/PT etc. Pt needs labs this week or next, to include Prograf

## 2025-03-12 NOTE — PT/OT/SLP PROGRESS
"Occupational Therapy  Treatment    Mitch Whittaker   MRN: [id number]   Admitting Diagnosis: C. difficile colitis    OT Date of Treatment: 03/12/25   OT Start Time: 0945  OT Stop Time: 1008  OT Total Time (min): 23 min    Billable Minutes:  Therapeutic Activity 8 minutes and Therapeutic Exercise 15 ninutes    OT/ANNIA: OT     Number of ANNIA visits since last OT visit: 0    General Precautions: Standard, fall, contact  Orthopedic Precautions: N/A  Braces: N/A  Respiratory Status: Room air         Subjective:  Communicated with nurse and epic chart review prior to session.    Pain/Comfort  Pain Rating 1: 0/10  Location 1: leg  Pain Addressed 1: Reposition, Distraction    Objective:  Patient found with: telemetry, peripheral IV     Therapeutic Activities and Exercises:  Educated patient on importance of increased tolerance to upright position and direct impact on CV endurance and strength. Patient encouraged to sit up EOB, for a minimum of 2 consecutive hours including for all meals.. Encouraged patient to perform AROM TE to BUE throughout the day within all available planes of motion. Re enforced importance of utilizing call light to meet needs in room and not attempt to get up without staff assistance. Patient verbalized understanding and agreed to comply.           AM-PAC 6 CLICK ADL   How much help from another person does this patient currently need?   1 = Unable, Total/Dependent Assistance  2 = A lot, Maximum/Moderate Assistance  3 = A little, Minimum/Contact Guard/Supervision  4 = None, Modified [place]/Independent    Putting on and taking off regular lower body clothing? : 1  Bathing (including washing, rinsing, drying)?: 2  Toileting, which includes using toilet, bedpan, or urinal? : 2  Putting on and taking off regular upper body clothing?: 2  Taking care of personal grooming such as brushing teeth?: 3  Eating meals?: 4  Daily Activity Total Score: 14     AM-PAC Raw Score CMS "G-Code Modifier Level of " Impairment Assistance   6 % Total / Unable   7 - 8 CM 80 - 100% Maximal Assist   9-13 CL 60 - 80% Moderate Assist   14 - 19 CK 40 - 60% Moderate Assist   20 - 22 CJ 20 - 40% Minimal Assist   23 CI 1-20% SBA / CGA   24 CH 0% Independent/ Mod I       Patient left up in chair with all lines intact, call button in reach, and nurse notified    ASSESSMENT:  Mitch Whittaker is a 69 y.o. male with a medical diagnosis of C. difficile colitis and presents with debility and generalized weakness.      Activity tolerance: Good    Discharge recommendations: Moderate Intensity Therapy   Barriers to discharge: Barriers to Discharge: None    Equipment recommendations: to be determined by next level of care    GOALS:   Multidisciplinary Problems       Occupational Therapy Goals          Problem: Occupational Therapy    Goal Priority Disciplines Outcome Interventions   Occupational Therapy Goal     OT, PT/OT Progressing    Description: O.T. GOALS TO BE MET BY 3-18-25    PT WILL TOLERATE 1 SET X 15 REPS B UE ROM EXERCISE  MOD (I) WITH SUPINE<>SIT TRANSFERS  MAX A WITH BSC TRANSFERS  TOILETING ON BSC WITH MOD A.                       DME Justifications:  Tbd by next level of care    Plan:  Patient to be seen 2 x/week to address the above listed problems via self-care/home management, therapeutic activities, therapeutic exercises  Plan of Care expires: 03/12/25  Plan of Care reviewed with: patient         03/12/2025

## 2025-03-12 NOTE — PT/OT/SLP PROGRESS
Physical Therapy  Treatment    Mitch Whittaker   MRN: [id number]   Admitting Diagnosis: C. difficile colitis    PT Received On: 03/12/25  PT Start Time: 1257     PT Stop Time: 1320    PT Total Time (min): 23 min       Billable Minutes:  Therapeutic Activity 23    Treatment Type: Treatment  PT/PTA: PTA     Number of PTA visits since last PT visit: 1       General Precautions: Standard, fall, contact  Orthopedic Precautions: N/A  Braces: N/A  Respiratory Status: Room air         Subjective:  Communicated with patient's nurse Sarina and performed Epic chart review prior to session.  Patient and his daughter states that he is going home, not to a SNF. States that they received a bedside commode, hospital bed and wheelchair for home.         Objective:   Patient found with: telemetry, peripheral IV    Functional Mobility:  Bed Mobility:    Mod A sup-sit, seated scooting    Transfers:   Max A with RW for STS - unable to perform a meaningful  3 attempts, barely cleared the bed on two of the three attempts    Gait:    Unable to perform at this time    Treatment and Education:  Educated patient on importance of increased tolerance to upright position and direct impact on CV endurance and strength. Patient encouraged to utilize elevated HOB to simulate chair position until able to safely complete chair T/F. Patient given a minimum goal of majority of the day to be spent in upright, especially with all meals. Encouraged patient to perform AROM TE to BLE throughout the day within all available planes of motion. Re enforced importance of utilizing call light to meet needs in room and not attempt to get up without staff assistance. Patient verbalized understanding and agreed to comply.  Educated patient and daughter on how to perform a safe transfer from bed to wheelchair. At the time of the education, I was unaware that patient was being sent home rather than a SNF, so all education was verbalized. Set up the chair at a 90  "degrees in correlation with the bed to simulate "bed to wheelchair" and how the sliding board should be used. Will require further education and assistance by HHPT.      AM-PAC 6 CLICK MOBILITY  How much help from another person does this patient currently need?   1 = Unable, Total/Dependent Assistance  2 = A lot, Maximum/Moderate Assistance  3 = A little, Minimum/Contact Guard/Supervision  4 = None, Modified [place]/Independent    Turning over in bed (including adjusting bedclothes, sheets and blankets)?: 2  Sitting down on and standing up from a chair with arms (e.g., wheelchair, bedside commode, etc.): 1  Moving from lying on back to sitting on the side of the bed?: 2  Moving to and from a bed to a chair (including a wheelchair)?: 1  Need to walk in hospital room?: 1  Climbing 3-5 steps with a railing?: 1  Basic Mobility Total Score: 8    AM-PAC Raw Score CMS G-Code Modifier Level of Impairment Assistance   6 % Total / Unable   7 - 9 CM 80 - 100% Maximal Assist   10 - 14 CL 60 - 80% Moderate Assist   15 - 19 CK 40 - 60% Moderate Assist   20 - 22 CJ 20 - 40% Minimal Assist   23 CI 1-20% SBA / CGA   24 CH 0% Independent/ Mod I     Patient left HOB elevated with call button in reach and family present.    Assessment:  Mitch Whittaker is a 69 y.o. male with a medical diagnosis of C. difficile colitis and presents with overall decline in functional mobility. Patient would continue to benefit from skilled PT to address functional limitations listed below in order to return to PLOF/decrease caregiver burden.  Patient unable to demonstrate a safe or functional STS from EOB. Attempted 3 times with Max A given each attempt. Considering the change in discharge circumstances, patient will be unable to safely transfer from his own bed to wheelchair. Informed RN, CN, and SW of this problem. Due to timing of the reveal of the information, verbalized sliding board transfers and recommended to SW that patient would " need one for home considering that this functional mobility is poor and unsafe. Advised to family to minimize transfers until patient can progress further with HHPT in order to reduce chances of falls and further injuries. Patient was willing to attempt mobility with PT. However, his functional mobility status will be very difficult for his family to assist him with.    Rehab identified problem list/impairments: weakness, impaired endurance, impaired self care skills, impaired functional mobility, gait instability, impaired balance, decreased coordination, decreased lower extremity function, decreased upper extremity function, decreased safety awareness, decreased ROM    Rehab potential is fair.    Activity tolerance: Fair    Discharge recommendations: Moderate Intensity Therapy      Barriers to discharge:      Equipment recommendations: slide board     GOALS:   Multidisciplinary Problems       Physical Therapy Goals          Problem: Physical Therapy    Goal Priority Disciplines Outcome Interventions   Physical Therapy Goal     PT, PT/OT Progressing    Description: LTG: 3/16/25  1. PT WILL LOG ROLL IN BED WITH MIN A  2. PT WILL SIT EOB WITH MOD A FOR SITTING TOLERANCE  3. PT WILL COMPLETE B LE TE X 10 REPS TO INC ROM AND STRENGTHENING  4. PT WILL INC AMPAC SCORE BY 2 POINTS TO PROGRESS GROSS FUNC MOBILITY.                          DME Justifications:  Sliding board, informed SW of th need.    PLAN:    Patient to be seen 3 x/week to address the above listed problems via therapeutic activities, therapeutic exercises  Plan of Care expires: 03/16/25  Plan of Care reviewed with: patient         03/12/2025

## 2025-03-12 NOTE — DISCHARGE SUMMARY
[facility] [place] [facility]  Discharge Summary      Patient Name: Mitch Whittaker  MRN: [id number]  [facility]: [id number]  Patient Class: IP- Inpatient  Admission Date: 2/26/2025  Hospital Length of Stay: 14 days  Discharge Date and Time: 3/12/2025  3:07 PM  Attending Physician: Dr. Medley   Discharging Provider: Emma Balbuena NP  Primary Care Provider: Valery Caal MD    Primary Care Team: Networked reference to record PCT     HPI:   Patient is a 69-year-old  male with a PMH of CHF, kidney transplant, CAD, DM, DVT who presents to the ED with complaints of weakness.  Patient is a resident at [facility].  He states he was sent here for renal failure.  Patient does report still producing urine however it is decreased.  Does complain of some dysuria.  Reports that oral intake is good.  Denies any fever or chills.  No other issues reported at this time.    In the ED, H&H 8.0/27, K:  2.9, BUN/CR 60/4.3.  ED discussed with Dr. Gonsalez who recommended fluid challenge due to concern with over-diuresis.    Admitted for hematuria and UTI       Hospital Course:   02/27/2025  Will start empiric Rocephin for UTI.  Creatinine stable.  Continue IVF.  Nephrology on case.  Patient may need renal biopsy.  02/28/2025  Creatinine trending down.  Will continue IVF.  Gross hematuria improving.  Continue Rocephin for UTI.  3/1 creatinine continues to improve. Discontinue intravenous fluids per nephrology. Discontinue gan per nephrology, monitor for urinary retention. Discussed blood transfusion with nephrology and patient also agreeable. No transfusion reaction in the past.  3/2 urology consulted. hematuria improving. Denies abdominal pain. Complains of weakness and cough and leg swelling. Physical/occupational therapy consulted. Creatinine improving  3/3: creatinine continues to improve. Entresto and diuretics remains on hold. Episode of hematuria with clots overnight, but resolved this AM. Outpatient follow  up with Urology for cystoscopy. Continue Rocephin for UTI  3/4: Creatine has returned to baseline. He has completed Rocephin for UTI. Intermittent episodes of hematuria that began last night. Patient is urinating with difficulty. Per urology still recommends outpatient follow up. Awaiting decision for SNF versus HH.   3/5/25: patient and family are agreeable to SNF. Urine dark yellow today. Hemoglobin still below goal of 8g/dL. Will transfuse additional unit today and give Bumex post transfusion.   3/6/25: Awaiting placement at [facility]  3/7/25: facility is waiting on financial documents. Family will turn in this weekend or will discharge home with HH.  3/8/25: Financial documents faxed over. Awaiting placement.   3/9/25: complaints of diarrhea over the weekend. C. Diff positive. Oral Vancomycin started. Hold Cellcept  3/10/25: pt had 6 diarrhea overnight- cont oral Vanco and cholestyramine   3/11/25: diarrhea improving. Discussed with Dr. Villasenor who recommended po Vanco x 10 days and Op f/u. Pt unable to provide SNF with financials. CM recommended home with home health. Hospital bed and equipment to be arranged.  3/12/25: HH and home equipment arranged.  Pt reports only one diarrhea since yesterday. No further hematuria. H/H stable. Pt will be discharged on oral Vanco. Eliquis has been restarted. F/u with urology, cardiology, ID, and nephrology. The patient was seen and examined today and was stable for discharge.      Goals of Care Treatment Preferences:  Code Status: Full Code      SDOH Screening:  The patient was screened for utility difficulties, food insecurity, transport difficulties, housing insecurity, and interpersonal safety and there were no concerns identified this admission.     Consults:   Consults (From admission, onward)          Status Ordering Provider     Inpatient consult to Nephrology  Once        Provider:  Leon Jones MD    Completed SHRUTHI CANCHOLA     Inpatient consult to  Social Work/Case Management  Once        Provider:  (Not yet assigned)    Completed NAGA HEATH     Inpatient consult to Urology  Once        Provider:  Stu Light MD    Completed NAGA HEATH     Inpatient consult to Nephrology  Once        Provider:  (Not yet assigned)    Completed JOSTIN GORMAN            Assessment & Plan  C. difficile colitis  Hold Cellcept per Nephrology recommendations  --Oral Vancomycin  --Questrain  --ID consulted    3/11/25: discussed C diff with ID who recommended 10 day course of po Vanco and f/u with ID.   Chronic immunosuppression with Prograf, MMF and prednisone  Will hold CellCept for now Continue Prograf   Prograf levels 3.4  Continue prednisone    3/9/25  Hold Cellcept due to C. Diff    Class 3 severe obesity due to excess calories with body mass index (BMI) of 40.0 to 44.9 in adult  Body mass index is 31.04 kg/m². Morbid obesity complicates all aspects of disease management from diagnostic modalities to treatment. Weight loss encouraged and health benefits explained to patient.   Physical/occupational therapy     Type II diabetes mellitus with renal manifestations  Patient's FSGs are controlled on current medication regimen.  Last A1c reviewed-   Lab Results   Component Value Date    HGBA1C 5.7 (H) 12/17/2024     Most recent fingerstick glucose reviewed-   Recent Labs   Lab 03/11/25 2006   POCTGLUCOSE 215*     Current correctional scale  Medium  Maintain anti-hyperglycemic dose as follows-   Antihyperglycemics (From admission, onward)      None          Hold Oral hypoglycemics while patient is in the hospital.  Continue adjusting insulin needs as indicated     3/4/25   Glucose above goal  Optimize long acting 20 units BID    3/5/25  Glucose better controlled today  Chronic combined systolic and diastolic congestive heart failure  Patient has Combined Systolic and Diastolic heart failure that is Chronic. On presentation their CHF was well compensated. Most recent BNP and  "echo results are listed below.  No results for input(s): "BNP" in the last 72 hours.    Latest ECHO  Results for orders placed during the hospital encounter of 11/26/24    Echo    Interpretation Summary    Left Ventricle: The left ventricle is normal in size. Normal wall thickness. There is concentric hypertrophy. Normal wall motion. Septal motion is consistent with bundle branch block. There is moderately reduced systolic function. Ejection fraction is approximately 35%. Grade I diastolic dysfunction.    Right Ventricle: Normal right ventricular cavity size. Wall thickness is normal. Systolic function is normal.    IVC/SVC: Normal venous pressure at 3 mmHg.    Current Heart Failure Medications  furosemide (LASIX) tablet, Daily PRN, Oral    Plan  - Monitor strict I&Os and daily weights.    - Place on telemetry  - Low sodium diet  - Place on fluid restriction of 1.5 L.   - Cardiology has not been consulted  - The patient's volume status is at their baseline  - patient appears euvolemic    One time bumex after blood transfusion    3/6/25  Stable.    Deep vein thrombosis (DVT) of lower extremity  Due to hematuria   Will hold anticoagulation for now  Hb/hct in am       3/5/25  No further episodes of hematuria. Will likely restart anticoagulation after Urology visit  3/11/25: No further hematuria, H/H stable. Will plan to restart Eliquis.   Chronic cough  Follows pulmonology outpatient  Former smoker  Complains of dry cough but no dyspnea  Schedule breathing treatments and monitor response    Gross hematuria  Possibly related to Gan insertion   Will continue to monitor   should hematuria persists will plan to consult Urology on case  Hematuria improving  Discontinue gan  Agreeable to blood transfusion  Urology note reviewed  Continue intravenous antibiotic(s)   Will need outpatient urology for cystoscopy     3/4/25  Intermittent episodes of hematuria.   No indication for further workup IP if no urinary retention per " Urology.   Will follow up outpatient    3/5/25  No episodes of hematuria today  Will plan for Urology OP visit once discharged      3/7/25  Appt scheduled for Dr. Camilo on 3/17/25 at 1100 on [place]. He will hold anticoagulation until then  UTI (urinary tract infection)  Continue Rocephin.   Completed  3//3/25      Anemia, chronic disease  Anemia is likely due to acute blood loss which was from hematuria and chronic disease due to Chronic Kidney Disease. Most recent hemoglobin and hematocrit are listed below.  Recent Labs     03/10/25  0725 03/11/25  0450   HGB 9.3* 9.3*   HCT 33.2* 31.6*     Plan  - Monitor serial CBC: Daily  - Transfuse PRBC if patient becomes hemodynamically unstable, symptomatic or H/H drops below 7/21.  - Patient has received 1 units of PRBCs on 3/1/25  - Patient's anemia is currently stable  - cbc in am    3/5/25  No more episodes of hematuria. Will give additional unit of blood today to reach goal of 8g/dL    3/7/25  Stable    Final Active Diagnoses:    Diagnosis Date Noted POA    PRINCIPAL PROBLEM:  C. difficile colitis [A04.72] 03/09/2025 Yes    Deep vein thrombosis (DVT) of lower extremity [I82.409] 07/24/2019 Yes    Gross hematuria [R31.0] 01/13/2025 Yes    Anemia, chronic disease [D63.8] 03/01/2025 Yes    UTI (urinary tract infection) [N39.0] 02/27/2025 Yes    Chronic cough [R05.3] 01/04/2023 Yes    Chronic combined systolic and diastolic congestive heart failure [I50.42] 03/15/2019 Yes    Type II diabetes mellitus with renal manifestations [E11.29]  Yes    Chronic immunosuppression with Prograf, MMF and prednisone [Z29.89] 06/18/2015 Not Applicable     Chronic    Class 3 severe obesity due to excess calories with body mass index (BMI) of 40.0 to 44.9 in adult [E66.813, Z68.41, E66.01]  Not Applicable      Problems Resolved During this Admission:    Diagnosis Date Noted Date Resolved POA    ANGELITO (acute kidney injury) [N17.9] 12/08/2022 03/10/2025 Yes       Discharged Condition:  "stable    Disposition: Home-Health Care [facility]    Follow Up:   Contact information for follow-up providers       Gómez Camilo MD Follow up on 3/17/2025.    Specialty: Urology  Contact information:  [id number] [id number]  [phone]               [facility] Follow up in 1 week(s).    Specialty: Cardiology  Contact information:  [address] [address]  [phone]             Hany Gonsalez MD Follow up in 1 week(s).    Specialty: Nephrology  Contact information:  [address] [address]  [phone]               [facility]enton [facility], [facility] week([facility]).    Specialties: Infectious Diseases, Hospitalist  Contact information:  [address] [address]  [phone]                       Contact information for after-discharge care       Destination       [facility] .    Service: Skilled Nursing  Contact information:  [address] [address]  [phone]                     Home Medical Care       [facility] .    Service: Home Health Services  Contact information:  [address] [address]  [phone]                                 Patient Instructions:      WHEELCHAIR FOR HOME USE     Order Specific Question Answer Comments   Hours in W/C per day: 8    Type of Wheelchair: Standard    Size(Width): 18"(STD adult)    Leg Support: STD footrests    Arm Height: Detachable    Lap Belt: Velcro    Accessories: Anti-tippers    Cushion: Basic    Reclining Back No    Height: 6' (1.829 m)    Weight: 101 kg (222 lb 10.6 oz)    Does patient have medical equipment at home? none    Length of need (1-99 months): 6    Please check all that apply: Caregiver is capable and willing to operate wheelchair safely.      BATH/SHOWER CHAIR FOR HOME USE     Order Specific Question Answer Comments   Height: 6' (1.829 m)  "   Weight: 101 kg (222 lb 10.6 oz)    Does patient have medical equipment at home? none    Length of need (1-99 months): 6    Type: With back      HOSPITAL BED FOR HOME USE     Order Specific Question Answer Comments   Type: Semi-electric    Length of need (1-99 months): 6    Does patient have medical equipment at home? none    Height: 6' (1.829 m)    Weight: 101 kg (222 lb 10.6 oz)    Please check all that apply: Patient requires positioning of the body in ways not feasible in an ordinary bed due to a medical condition which is expected to last at least one month.    Please check all that apply: Patient requires the head of bed to be elevated more than 30 degrees most of the time due to congestive heart failure, chronic pulmonary disease, or aspiration.  Pillows and wedges have been considered and ruled out.      COMMODE FOR HOME USE     Order Specific Question Answer Comments   Type: Drop arm    Type: need to transfer    Height: 6' (1.829 m)    Weight: 101 kg (222 lb 10.6 oz)    Does patient have medical equipment at home? none    Length of need (1-99 months): 6      SLIDING BOARD FOR HOME USE     Order Specific Question Answer Comments   Height: 6' (1.829 m)    Weight: 103.8 kg (228 lb 13.4 oz)    Does patient have medical equipment at home? none    Length of need (1-99 months): 6    Reason for Sliding Board: Patient has a hospital bed    Need for transfer? Yes    Is the patient non-ambulatory? Yes    Is the patient chair bound? Yes    Is the patient bed bound? No      SLIDING BOARD FOR HOME USE     Order Specific Question Answer Comments   Height: 6' (1.829 m)    Weight: 103.8 kg (228 lb 13.4 oz)    Does patient have medical equipment at home? none    Length of need (1-99 months): 99    Reason for Sliding Board: Patient has a hospital bed    Need for transfer? Yes    Is the patient non-ambulatory? Yes    Is the patient chair bound? Yes    Is the patient bed bound? Yes      Ambulatory referral/consult to Urology    Standing Status: Future   Referral Priority: Routine Referral Type: Consultation   Referral Reason: Specialty Services Required   Requested Specialty: Urology   Number of Visits Requested: 1     Ambulatory referral/consult to Cardiology   Standing Status: Future   Referral Priority: Routine Referral Type: Consultation   Referral Reason: Specialty Services Required   Requested Specialty: Cardiology   Number of Visits Requested: 1     Ambulatory referral/consult to Infectious Disease   Standing Status: Future   Referral Priority: Routine Referral Type: Consultation   Referral Reason: Specialty Services Required   Requested Specialty: Infectious Diseases   Number of Visits Requested: 1     ACCEPT - Ambulatory referral/consult to [facility]   Standing Status: Future   Referral Priority: Routine Referral Type: Consultation   Referral Reason: Specialty Services Required   Requested Specialty: Cardiology   Number of Visits Requested: 1     Ambulatory referral/consult to Nephrology   Standing Status: Future   Referral Priority: Routine Referral Type: Consultation   Referral Reason: Specialty Services Required   Referred to Provider: ANDRA LOPEZ Requested Specialty: Nephrology   Number of Visits Requested: 1     Ambulatory referral/consult to [facility]   Standing Status: Future   Referral Priority: Routine Referral Type: Consultation   Referral Reason: Specialty Services Required   Number of Visits Requested: 1     Diet diabetic     Diet Cardiac   Order Comments: 1500 fluid restriction, 2 gm sodium     Activity as tolerated       Significant Diagnostic Studies:   Imaging Results              X-Ray Chest 1 View (Final result)  Result time 02/26/25 13:46:25      Final result by Joe Leach MD (02/26/25 13:46:25)                   Impression:      1.  Negative for acute process involving the chest, considering there are chronically low lung volumes.    2.  Stable findings as  noted above.      Electronically signed by: Joe Leach MD  Date:    02/26/2025  Time:    13:46               Narrative:    EXAMINATION:  XR CHEST 1 VIEW    CLINICAL HISTORY:  Dyspnea, unspecified    COMPARISON:  January 25, 2025, as well as studies dating back to February 10, 2023.    FINDINGS:  EKG leads overlie the chest.  Chronically low lung volumes.  The lungs are free of new pulmonary opacities.  The cardiac silhouette size is enlarged.  The trachea is midline and the mediastinal width is normal. Negative for focal infiltrate, effusion or pneumothorax. Pulmonary vasculature is normal. Negative for osseous abnormalities. Tortuous aorta with calcifications of the aortic knob.  There are degenerative changes of the spine and both shoulder girdles.  Right axillary region vascular stent again seen.                                       Pending Diagnostic Studies:       None           Medications:  Reconciled Home Medications:      Medication List        PAUSE taking these medications      ENTRESTO  mg per tablet  Wait to take this until your doctor or other care provider tells you to start again.  Generic drug: sacubitriL-valsartan  Take 1 tablet by mouth 2 (two) times daily.     mycophenolate 250 mg Cap  Wait to take this until: March 20, 2025  If diarrhea resolves  Commonly known as: CELLCEPT  Take 2 capsules (500 mg total) by mouth 2 (two) times daily.            START taking these medications      cholestyramine 4 gram packet  Commonly known as: QUESTRAN  Take 1 packet (4 g total) by mouth 2 (two) times daily. for 9 days     sodium bicarbonate 650 MG tablet  Take 1 tablet (650 mg total) by mouth 2 (two) times daily.     vancomycin 125 MG capsule  Commonly known as: VANCOCIN  Take 1 capsule (125 mg total) by mouth 4 (four) times daily. for 8 days            CHANGE how you take these medications      furosemide 40 MG tablet  Commonly known as: LASIX  Take 1 tablet (40 mg total) by mouth daily as  "needed.  What changed:   when to take this  reasons to take this     insulin aspart U-100 100 unit/mL (3 mL) Inpn pen  Commonly known as: NovoLOG  Inject 0-10 Units into the skin before meals and at bedtime as needed (Hyperglycemia). **MODERATE CORRECTION DOSE**  Blood Glucose  mg/dL                  Pre-meal                2200  151-200                2 units                    1 unit  201-250                4 units                    2 units  251-300                6 units                    3 units  301-350                8 units                    4 units  >350                     10 units                  5 units  Administer subcutaneously if needed at times designated by monitoring  schedule.  DO NOT HOLD correction dose insulin for patients who are  NPO.    "HIGH ALERT MEDICATION" - Administer with meals or TF/TPN.  What changed:   how much to take  when to take this  reasons to take this  additional instructions     LANTUS SOLOSTAR U-100 INSULIN 100 unit/mL (3 mL) Inpn pen  Generic drug: insulin glargine U-100 (Lantus)  Inject 15 Units into the skin 2 (two) times daily.  What changed: how much to take            CONTINUE taking these medications      * albuterol 2.5 mg /3 mL (0.083 %) nebulizer solution  Commonly known as: PROVENTIL  Take 3 mLs (2.5 mg total) by nebulization every 4 to 6 hours as needed for Wheezing or Shortness of Breath.     * albuterol 90 mcg/actuation inhaler  Commonly known as: PROVENTIL/VENTOLIN HFA  Inhale 2 puffs into the lungs every 4 (four) hours as needed for Wheezing or Shortness of Breath.     amitriptyline 25 MG tablet  Commonly known as: ELAVIL  Take 1 tablet (25 mg total) by mouth nightly as needed for Insomnia.     aspirin 81 MG EC tablet  Commonly known as: ECOTRIN  Take 1 tablet by mouth Daily.     calcitRIOL 0.25 MCG Cap  Commonly known as: ROCALTROL  Take 1 capsule (0.25 mcg total) by mouth once daily.     ELIQUIS 5 mg Tab  Generic drug: apixaban  Take 1 tablet (5 mg " "total) by mouth 2 (two) times daily.     famotidine 20 MG tablet  Commonly known as: PEPCID  TAKE ONE TABLET BY MOUTH EVERY EVENING     FeroSuL 325 mg (65 mg iron) Tab tablet  Generic drug: ferrous sulfate  Take 1 tablet (325 mg total) by mouth daily with breakfast.     fish oil-omega-3 fatty acids 300-1,000 mg capsule  Take 1 g by mouth once daily.     HYDROcodone-acetaminophen 5-325 mg per tablet  Commonly known as: NORCO  Take 1 tablet by mouth every 6 (six) hours as needed for Pain.     ketoconazole 200 mg Tab  Commonly known as: NIZORAL  Take ½ tablet (100 mg total) by mouth once daily.     metoprolol succinate 25 MG 24 hr tablet  Commonly known as: TOPROL-XL  Take 1 tablet (25 mg total) by mouth once daily.     multivitamin tablet  Commonly known as: THERAGRAN  Take 1 tablet by mouth once daily.     ondansetron 4 MG Tbdl  Commonly known as: ZOFRAN-ODT  Dissolve 1 tablet (4 mg total) by mouth every 8 (eight) hours as needed (Nausea/vomiting).     OZEMPIC 2 mg/dose (8 mg/3 mL) Pnij  Generic drug: semaglutide  Inject 2 mg into the skin every 7 days.     predniSONE 5 MG tablet  Commonly known as: DELTASONE  Take 1 tablet (5 mg total) by mouth once daily.     rosuvastatin 40 MG Tab  Commonly known as: CRESTOR  Take 1 tablet (40 mg total) by mouth once daily.     tacrolimus 1 MG Cap  Commonly known as: PROGRAF  Take 4 capsules (4mg) every morning AND take 3 capsules (3mg) by mouth every evening     triamcinolone acetonide 0.1% 0.1 % ointment  Commonly known as: KENALOG  Apply topically 2 (two) times daily. Use on bilateral lower legs.           * This list has 2 medication(s) that are the same as other medications prescribed for you. Read the directions carefully, and ask your doctor or other care provider to review them with you.                STOP taking these medications      BD ULTRA-FINE MINI PEN NEEDLE 31 gauge x 3/16" Ndle  Generic drug: pen needle, diabetic     blood-glucose meter kit  Commonly known as: " FREESTYLE LITE METER     CONTOUR NEXT TEST STRIPS Strp  Generic drug: blood sugar diagnostic     DEXCOM G7 SENSOR Alba  Generic drug: blood-glucose sensor     magnesium oxide 400 mg (241.3 mg magnesium) tablet  Commonly known as: MAG-OX     MICROLET LANCET Misc  Generic drug: lancets     ondansetron 4 MG tablet  Commonly known as: ZOFRAN     potassium chloride SA 20 MEQ tablet  Commonly known as: K-DUR,KLOR-CON              Indwelling Lines/Drains at time of discharge:   Lines/Drains/Airways       Drain  Duration                  Hemodialysis AV Fistula Right upper arm -- days                    Time spent on the discharge of patient: 45 minutes         Emma Balbuena NP  Department of Hospital Medicine  [facility] - Med S[facility]

## 2025-03-12 NOTE — ASSESSMENT & PLAN NOTE
Due to hematuria   Will hold anticoagulation for now  Hb/hct in am       3/5/25  No further episodes of hematuria. Will likely restart anticoagulation after Urology visit  3/11/25: No further hematuria, H/H stable. Will plan to restart Eliquis.

## 2025-03-13 ENCOUNTER — HOSPITAL ENCOUNTER (INPATIENT)
Facility: HOSPITAL | Age: 70
LOS: 6 days | Discharge: HOME HEALTH | DRG: 291 | End: 2025-03-20
Attending: EMERGENCY MEDICINE | Admitting: EMERGENCY MEDICINE
Payer: COMMERCIAL

## 2025-03-13 ENCOUNTER — PATIENT OUTREACH (OUTPATIENT)
Dept: ADMINISTRATIVE | Facility: CLINIC | Age: 70
End: 2025-03-13
Payer: COMMERCIAL

## 2025-03-13 ENCOUNTER — TELEPHONE (OUTPATIENT)
Dept: HOME HEALTH SERVICES | Facility: CLINIC | Age: 70
End: 2025-03-13
Payer: COMMERCIAL

## 2025-03-13 ENCOUNTER — TELEPHONE (OUTPATIENT)
Dept: INFECTIOUS DISEASES | Facility: CLINIC | Age: 70
End: 2025-03-13
Payer: COMMERCIAL

## 2025-03-13 ENCOUNTER — TELEPHONE (OUTPATIENT)
Dept: CARDIOLOGY | Facility: CLINIC | Age: 70
End: 2025-03-13
Payer: COMMERCIAL

## 2025-03-13 ENCOUNTER — NURSE TRIAGE (OUTPATIENT)
Dept: ADMINISTRATIVE | Facility: CLINIC | Age: 70
End: 2025-03-13
Payer: COMMERCIAL

## 2025-03-13 DIAGNOSIS — E66.01 MORBID OBESITY WITH BMI OF 40.0-44.9, ADULT: ICD-10-CM

## 2025-03-13 DIAGNOSIS — M79.601 BILATERAL ARM PAIN: ICD-10-CM

## 2025-03-13 DIAGNOSIS — N18.4 CKD (CHRONIC KIDNEY DISEASE) STAGE 4, GFR 15-29 ML/MIN: ICD-10-CM

## 2025-03-13 DIAGNOSIS — I50.9 CHF (CONGESTIVE HEART FAILURE): ICD-10-CM

## 2025-03-13 DIAGNOSIS — E66.01 SEVERE OBESITY (BMI >= 40): Primary | ICD-10-CM

## 2025-03-13 DIAGNOSIS — R55 SYNCOPE AND COLLAPSE: ICD-10-CM

## 2025-03-13 DIAGNOSIS — M79.602 BILATERAL ARM PAIN: ICD-10-CM

## 2025-03-13 DIAGNOSIS — M79.89 LEG SWELLING: ICD-10-CM

## 2025-03-13 DIAGNOSIS — R60.9 EDEMA: ICD-10-CM

## 2025-03-13 DIAGNOSIS — M79.89 ARM SWELLING: ICD-10-CM

## 2025-03-13 DIAGNOSIS — R53.81 PHYSICAL DECONDITIONING: ICD-10-CM

## 2025-03-13 PROCEDURE — 83880 ASSAY OF NATRIURETIC PEPTIDE: CPT | Performed by: EMERGENCY MEDICINE

## 2025-03-13 PROCEDURE — 93005 ELECTROCARDIOGRAM TRACING: CPT

## 2025-03-13 PROCEDURE — 87389 HIV-1 AG W/HIV-1&-2 AB AG IA: CPT | Performed by: EMERGENCY MEDICINE

## 2025-03-13 PROCEDURE — 82550 ASSAY OF CK (CPK): CPT | Performed by: EMERGENCY MEDICINE

## 2025-03-13 PROCEDURE — 93010 ELECTROCARDIOGRAM REPORT: CPT | Mod: ,,, | Performed by: INTERNAL MEDICINE

## 2025-03-13 PROCEDURE — 85379 FIBRIN DEGRADATION QUANT: CPT | Performed by: EMERGENCY MEDICINE

## 2025-03-13 PROCEDURE — 84484 ASSAY OF TROPONIN QUANT: CPT | Performed by: EMERGENCY MEDICINE

## 2025-03-13 PROCEDURE — 99285 EMERGENCY DEPT VISIT HI MDM: CPT | Mod: 25

## 2025-03-13 PROCEDURE — 86803 HEPATITIS C AB TEST: CPT | Performed by: EMERGENCY MEDICINE

## 2025-03-13 PROCEDURE — 85007 BL SMEAR W/DIFF WBC COUNT: CPT | Performed by: EMERGENCY MEDICINE

## 2025-03-13 PROCEDURE — 85027 COMPLETE CBC AUTOMATED: CPT | Performed by: EMERGENCY MEDICINE

## 2025-03-13 PROCEDURE — 80053 COMPREHEN METABOLIC PANEL: CPT | Performed by: EMERGENCY MEDICINE

## 2025-03-13 NOTE — PROGRESS NOTES
C3 nurse spoke with Mitch Whittaker ( sister )  for a TCC post hospital discharge follow up call. The patient has a scheduled HOSFU appointment with Javy OLSEN on 08/20/2025 @ NP will discuss arrival time prior to appt. .    Call transferred to triage for further assessment: patient sister verbalized  patient started having swelling of legs, feet, ankles and shoulder pain , vomited x1 and difficulty swallowing meals other than soft mash potatoes. No other distress of SOB noted.   
  C3 nurse attempted to contact patient. The following occurred:   C3 nurse attempted to contact Mitch Whittaker  for a TCC post hospital discharge follow up call. The patient is unable to conduct the call @ this time. The patient requested a callback.    The patient does not have a scheduled HOSFU appointment within 5-7 days post hospital discharge date 03/12/2025. Message sent to Physician staff to assist with HOSFU appointment scheduling.      
No

## 2025-03-13 NOTE — TELEPHONE ENCOUNTER
I contacted the pt re a referral we have for him. Pt only wants to see you. Advised that your next appt was in June and since he is an est pt that we could have him see the NP. Appt made with NP for march 21st which is a Friday. He asked me to contact you to see if you can work him in on your schedule somewhere. He would like me to call him back with your response. Thanks baldomero

## 2025-03-13 NOTE — TELEPHONE ENCOUNTER
Pt's wife reports pt woke up today with swelling in his hands, one is more than the other, going up his arm some, also having ankle swelling. Pt also reports having trouble swallowing today, was not able to eat a biscuit, but has been drinking plenty of fluids and was able to eat some soft food like mashed potatoes. Pt advised to go to the ED now per protocol, Wife encouraged to call back with any worsening symptoms or questions. Was also advised to let the ED staff know pt is a kidney transplant pt so they will know to call and speak to the on call MD for transplant at [phone]. She verbalized understanding.    Reason for Disposition   Weak immune system (e.g., HIV positive, cancer chemo, splenectomy, organ transplant, chronic steroids)   [1] Can't use arm or can barely use arm AND [2] new-onset    Additional Information   Negative: [1] Severe difficulty swallowing (e.g., drooling or spitting) AND [2] started suddenly after taking a medicine or allergic food   Negative: Wheezing, stridor, hoarseness, or difficulty breathing   Negative: [1] Swollen tongue AND [2] sudden onset   Negative: Sounds like a life-threatening emergency to the triager   Negative: SEVERE difficulty swallowing (e.g., drooling or spitting, can't swallow water)   Negative: [1] Symptoms of blocked esophagus (e.g., can't swallow normal secretions, drooling) AND [2] present now   Negative: Symptoms of food or bone stuck in throat or esophagus (e.g., pain in throat or chest, FB sensation, blood-tinged saliva)   Negative: SEVERE symptoms of pill stuck in throat or esophagus (e.g., severe pain, bleeding, or inability to swallow liquids)   Negative: [1] Drinking very little AND [2] dehydration suspected (e.g., no urine > 12 hours, very dry mouth, very lightheaded)   Negative: [1] Refuses to drink anything AND [2] for > 12 hours   Negative: Patient sounds very sick or weak to the triager   Negative: Fever > 100.4 F (38.0 C)   Negative: [1] Coughing  spells AND [2] occur during eating/feedings or within 2 hours   Negative: [1] Symptoms of pill stuck in throat or esophagus (e.g., pain in throat or chest, FB sensation) AND [2] no relief after using Care Advice   Negative: SEVERE difficulty breathing (e.g., struggling for each breath, speaks in single words)   Negative: Sounds like a life-threatening emergency to the triager   Negative: SEVERE arm swelling (e.g., all of arm looks swollen)   Negative: [1] MODERATE arm swelling (e.g., puffiness or swollen feeling of entire arm) and [2] PICC Line   Negative: [1] MODERATE arm swelling and [2] central venous catheter (central line, internal jugular line)   Negative: Difficulty breathing at rest   Negative: Looks like a broken bone or dislocated joint (e.g., crooked or deformed)   Negative: Entire hand is cool or blue in comparison to other side    Protocols used: Swallowing Difficulty-A-AH, Arm Swelling and Edema-A-AH

## 2025-03-13 NOTE — TELEPHONE ENCOUNTER
1st attempt    Attempted to schedule appt from referral placed to ID. Someone answered and said patient was not available at the moment and asked if I could callback in 20 minutes.

## 2025-03-13 NOTE — TELEPHONE ENCOUNTER
Contacted pt spouse Michelle back to schedule an appointment regarding a referral placed for a tcc home visit with a nurse practitioner. Patient has been scheduled

## 2025-03-13 NOTE — TELEPHONE ENCOUNTER
Contacted pt to schedule an appointment regarding a referral placed for a tcc home visit with a nurse practitioner. Spk with spouse Michelle, requested call back in about 20 min, was on the phone with someone else at the moment

## 2025-03-14 ENCOUNTER — TELEPHONE (OUTPATIENT)
Dept: INTERNAL MEDICINE | Facility: CLINIC | Age: 70
End: 2025-03-14
Payer: COMMERCIAL

## 2025-03-14 PROBLEM — I50.23 ACUTE ON CHRONIC SYSTOLIC CONGESTIVE HEART FAILURE: Status: ACTIVE | Noted: 2024-02-22

## 2025-03-14 LAB
ACANTHOCYTES BLD QL SMEAR: PRESENT
ALBUMIN SERPL BCP-MCNC: 2.1 G/DL (ref 3.5–5.2)
ALP SERPL-CCNC: 84 U/L (ref 40–150)
ALT SERPL W/O P-5'-P-CCNC: 10 U/L (ref 10–44)
ANION GAP SERPL CALC-SCNC: 10 MMOL/L (ref 8–16)
ANION GAP SERPL CALC-SCNC: 10 MMOL/L (ref 8–16)
ANISOCYTOSIS BLD QL SMEAR: SLIGHT
AORTIC ROOT ANNULUS: 4.01 CM
ASCENDING AORTA: 3.69 CM
AST SERPL-CCNC: 13 U/L (ref 10–40)
AV INDEX (PROSTH): 0.93
AV MEAN GRADIENT: 6 MMHG
AV PEAK GRADIENT: 9 MMHG
AV VALVE AREA BY VELOCITY RATIO: 2.1 CM²
AV VALVE AREA: 3.2 CM²
AV VELOCITY RATIO: 0.6
BACTERIA #/AREA URNS HPF: ABNORMAL /HPF
BASOPHILS # BLD AUTO: ABNORMAL K/UL (ref 0–0.2)
BASOPHILS NFR BLD: 0 % (ref 0–1.9)
BASOPHILS NFR BLD: 0 % (ref 0–1.9)
BILIRUB SERPL-MCNC: 0.6 MG/DL (ref 0.1–1)
BILIRUB UR QL STRIP: NEGATIVE
BNP SERPL-MCNC: 762 PG/ML (ref 0–99)
BSA FOR ECHO PROCEDURE: 2.41 M2
BUN SERPL-MCNC: 31 MG/DL (ref 8–23)
BUN SERPL-MCNC: 31 MG/DL (ref 8–23)
CALCIUM SERPL-MCNC: 8.9 MG/DL (ref 8.7–10.5)
CALCIUM SERPL-MCNC: 9 MG/DL (ref 8.7–10.5)
CHLORIDE SERPL-SCNC: 109 MMOL/L (ref 95–110)
CHLORIDE SERPL-SCNC: 109 MMOL/L (ref 95–110)
CK SERPL-CCNC: 9 U/L (ref 20–200)
CLARITY UR: CLEAR
CO2 SERPL-SCNC: 16 MMOL/L (ref 23–29)
CO2 SERPL-SCNC: 17 MMOL/L (ref 23–29)
COLOR UR: YELLOW
CREAT SERPL-MCNC: 1.8 MG/DL (ref 0.5–1.4)
CREAT SERPL-MCNC: 1.8 MG/DL (ref 0.5–1.4)
CV ECHO LV RWT: 0.59 CM
D DIMER PPP IA.FEU-MCNC: 3.53 MG/L FEU
DIFFERENTIAL METHOD BLD: ABNORMAL
DIFFERENTIAL METHOD BLD: ABNORMAL
DOP CALC AO PEAK VEL: 1.5 M/S
DOP CALC AO VTI: 19.7 CM
DOP CALC LVOT AREA: 3.5 CM2
DOP CALC LVOT DIAMETER: 2.1 CM
DOP CALC LVOT PEAK VEL: 0.9 M/S
DOP CALC LVOT STROKE VOLUME: 63.4 CM3
DOP CALC RVOT PEAK VEL: 1.13 M/S
DOP CALC RVOT VTI: 17.7 CM
DOP CALCLVOT PEAK VEL VTI: 18.3 CM
E WAVE DECELERATION TIME: 144 MSEC
E/A RATIO: 0.62
E/E' RATIO: 12 M/S
ECHO LV POSTERIOR WALL: 1.5 CM (ref 0.6–1.1)
EOSINOPHIL # BLD AUTO: ABNORMAL K/UL (ref 0–0.5)
EOSINOPHIL NFR BLD: 0 % (ref 0–8)
EOSINOPHIL NFR BLD: 0 % (ref 0–8)
ERYTHROCYTE [DISTWIDTH] IN BLOOD BY AUTOMATED COUNT: 15.4 % (ref 11.5–14.5)
ERYTHROCYTE [DISTWIDTH] IN BLOOD BY AUTOMATED COUNT: 15.6 % (ref 11.5–14.5)
EST. GFR  (NO RACE VARIABLE): 40 ML/MIN/1.73 M^2
EST. GFR  (NO RACE VARIABLE): 40 ML/MIN/1.73 M^2
FRACTIONAL SHORTENING: 15.7 % (ref 28–44)
GIANT PLATELETS BLD QL SMEAR: PRESENT
GIANT PLATELETS BLD QL SMEAR: PRESENT
GLUCOSE SERPL-MCNC: 133 MG/DL (ref 70–110)
GLUCOSE SERPL-MCNC: 174 MG/DL (ref 70–110)
GLUCOSE UR QL STRIP: NEGATIVE
GRAN CASTS #/AREA URNS LPF: 21 /LPF
HCT VFR BLD AUTO: 29.6 % (ref 40–54)
HCT VFR BLD AUTO: 31.4 % (ref 40–54)
HCV AB SERPL QL IA: POSITIVE
HEP C VIRUS HOLD SPECIMEN: NORMAL
HGB BLD-MCNC: 9 G/DL (ref 14–18)
HGB BLD-MCNC: 9.2 G/DL (ref 14–18)
HGB UR QL STRIP: ABNORMAL
HIV 1+2 AB+HIV1 P24 AG SERPL QL IA: NEGATIVE
HYALINE CASTS #/AREA URNS LPF: 67 /LPF
IMM GRANULOCYTES # BLD AUTO: ABNORMAL K/UL (ref 0–0.04)
IMM GRANULOCYTES # BLD AUTO: ABNORMAL K/UL (ref 0–0.04)
IMM GRANULOCYTES NFR BLD AUTO: ABNORMAL % (ref 0–0.5)
IMM GRANULOCYTES NFR BLD AUTO: ABNORMAL % (ref 0–0.5)
INTERVENTRICULAR SEPTUM: 1.5 CM (ref 0.6–1.1)
IVRT: 65 MSEC
KETONES UR QL STRIP: NEGATIVE
LA MAJOR: 5.9 CM
LA MINOR: 5.5 CM
LA WIDTH: 4 CM
LEFT ATRIUM SIZE: 4.3 CM
LEFT ATRIUM VOLUME INDEX: 35 ML/M2
LEFT ATRIUM VOLUME: 83 CM3
LEFT INTERNAL DIMENSION IN SYSTOLE: 4.3 CM (ref 2.1–4)
LEFT VENTRICLE DIASTOLIC VOLUME INDEX: 53.19 ML/M2
LEFT VENTRICLE DIASTOLIC VOLUME: 125 ML
LEFT VENTRICLE MASS INDEX: 141.4 G/M2
LEFT VENTRICLE SYSTOLIC VOLUME INDEX: 35.3 ML/M2
LEFT VENTRICLE SYSTOLIC VOLUME: 83 ML
LEFT VENTRICULAR INTERNAL DIMENSION IN DIASTOLE: 5.1 CM (ref 3.5–6)
LEFT VENTRICULAR MASS: 332.4 G
LEUKOCYTE ESTERASE UR QL STRIP: NEGATIVE
LV LATERAL E/E' RATIO: 10.3 M/S
LV SEPTAL E/E' RATIO: 15.5 M/S
LVED V (TEICH): 124.59 ML
LVES V (TEICH): 82.81 ML
LVOT MG: 3.06 MMHG
LVOT MV: 0.87 CM/S
LYMPHOCYTES # BLD AUTO: ABNORMAL K/UL (ref 1–4.8)
LYMPHOCYTES NFR BLD: 11 % (ref 18–48)
LYMPHOCYTES NFR BLD: 28 % (ref 18–48)
MCH RBC QN AUTO: 24.1 PG (ref 27–31)
MCH RBC QN AUTO: 24.8 PG (ref 27–31)
MCHC RBC AUTO-ENTMCNC: 29.3 G/DL (ref 32–36)
MCHC RBC AUTO-ENTMCNC: 30.4 G/DL (ref 32–36)
MCV RBC AUTO: 82 FL (ref 82–98)
MCV RBC AUTO: 82 FL (ref 82–98)
MICROSCOPIC COMMENT: ABNORMAL
MONOCYTES # BLD AUTO: ABNORMAL K/UL (ref 0.3–1)
MONOCYTES NFR BLD: 11 % (ref 4–15)
MONOCYTES NFR BLD: 12 % (ref 4–15)
MV PEAK A VEL: 1 M/S
MV PEAK E VEL: 0.62 M/S
MV STENOSIS PRESSURE HALF TIME: 41.71 MS
MV VALVE AREA P 1/2 METHOD: 5.27 CM2
NEUTROPHILS NFR BLD: 61 % (ref 38–73)
NEUTROPHILS NFR BLD: 76 % (ref 38–73)
NEUTS BAND NFR BLD MANUAL: 1 %
NITRITE UR QL STRIP: NEGATIVE
NRBC BLD-RTO: 0 /100 WBC
NRBC BLD-RTO: 0 /100 WBC
OHS QRS DURATION: 144 MS
OHS QTC CALCULATION: 508 MS
OVALOCYTES BLD QL SMEAR: ABNORMAL
OVALOCYTES BLD QL SMEAR: ABNORMAL
PH UR STRIP: 5 [PH] (ref 5–8)
PISA MRMAX VEL: 3.05 M/S
PISA TR MAX VEL: 2.3 M/S
PLATELET # BLD AUTO: 192 K/UL (ref 150–450)
PLATELET # BLD AUTO: 197 K/UL (ref 150–450)
PLATELET BLD QL SMEAR: ABNORMAL
PLATELET BLD QL SMEAR: ABNORMAL
PMV BLD AUTO: 10.3 FL (ref 9.2–12.9)
PMV BLD AUTO: 10.5 FL (ref 9.2–12.9)
POCT GLUCOSE: 107 MG/DL (ref 70–110)
POCT GLUCOSE: 115 MG/DL (ref 70–110)
POCT GLUCOSE: 142 MG/DL (ref 70–110)
POCT GLUCOSE: 163 MG/DL (ref 70–110)
POIKILOCYTOSIS BLD QL SMEAR: SLIGHT
POIKILOCYTOSIS BLD QL SMEAR: SLIGHT
POTASSIUM SERPL-SCNC: 4.1 MMOL/L (ref 3.5–5.1)
POTASSIUM SERPL-SCNC: 4.7 MMOL/L (ref 3.5–5.1)
PROT SERPL-MCNC: 5.1 G/DL (ref 6–8.4)
PROT UR QL STRIP: NEGATIVE
PV MEAN GRADIENT: 4 MMHG
RA MAJOR: 4.17 CM
RA PRESSURE ESTIMATED: 3 MMHG
RA WIDTH: 2.8 CM
RBC # BLD AUTO: 3.63 M/UL (ref 4.6–6.2)
RBC # BLD AUTO: 3.81 M/UL (ref 4.6–6.2)
RBC #/AREA URNS HPF: 54 /HPF (ref 0–4)
RV TB RVSP: 5 MMHG
SCHISTOCYTES BLD QL SMEAR: PRESENT
SODIUM SERPL-SCNC: 135 MMOL/L (ref 136–145)
SODIUM SERPL-SCNC: 136 MMOL/L (ref 136–145)
SP GR UR STRIP: 1.01 (ref 1–1.03)
STJ: 3.69 CM
TDI LATERAL: 0.06 M/S
TDI SEPTAL: 0.04 M/S
TDI: 0.05 M/S
TR MAX PG: 21 MMHG
TRICUSPID ANNULAR PLANE SYSTOLIC EXCURSION: 2.11 CM
TROPONIN I SERPL DL<=0.01 NG/ML-MCNC: 0.03 NG/ML (ref 0–0.03)
TV REST PULMONARY ARTERY PRESSURE: 24 MMHG
URN SPEC COLLECT METH UR: ABNORMAL
UROBILINOGEN UR STRIP-ACNC: NEGATIVE EU/DL
WBC # BLD AUTO: 4.59 K/UL (ref 3.9–12.7)
WBC # BLD AUTO: 4.7 K/UL (ref 3.9–12.7)
Z-SCORE OF LEFT VENTRICULAR DIMENSION IN END DIASTOLE: -6.56
Z-SCORE OF LEFT VENTRICULAR DIMENSION IN END SYSTOLE: -2.51

## 2025-03-14 PROCEDURE — 63600175 PHARM REV CODE 636 W HCPCS: Mod: JZ,TB | Performed by: EMERGENCY MEDICINE

## 2025-03-14 PROCEDURE — 63600175 PHARM REV CODE 636 W HCPCS: Performed by: EMERGENCY MEDICINE

## 2025-03-14 PROCEDURE — 21400001 HC TELEMETRY ROOM

## 2025-03-14 PROCEDURE — 85027 COMPLETE CBC AUTOMATED: CPT | Performed by: FAMILY MEDICINE

## 2025-03-14 PROCEDURE — 25000003 PHARM REV CODE 250: Performed by: EMERGENCY MEDICINE

## 2025-03-14 PROCEDURE — 63600175 PHARM REV CODE 636 W HCPCS: Performed by: FAMILY MEDICINE

## 2025-03-14 PROCEDURE — 36415 COLL VENOUS BLD VENIPUNCTURE: CPT | Performed by: EMERGENCY MEDICINE

## 2025-03-14 PROCEDURE — 85007 BL SMEAR W/DIFF WBC COUNT: CPT | Performed by: FAMILY MEDICINE

## 2025-03-14 PROCEDURE — 87522 HEPATITIS C REVRS TRNSCRPJ: CPT | Performed by: EMERGENCY MEDICINE

## 2025-03-14 PROCEDURE — 27000207 HC ISOLATION

## 2025-03-14 PROCEDURE — 80048 BASIC METABOLIC PNL TOTAL CA: CPT | Performed by: FAMILY MEDICINE

## 2025-03-14 PROCEDURE — 96374 THER/PROPH/DIAG INJ IV PUSH: CPT

## 2025-03-14 PROCEDURE — 81000 URINALYSIS NONAUTO W/SCOPE: CPT | Performed by: EMERGENCY MEDICINE

## 2025-03-14 PROCEDURE — 96372 THER/PROPH/DIAG INJ SC/IM: CPT | Performed by: FAMILY MEDICINE

## 2025-03-14 PROCEDURE — 11000001 HC ACUTE MED/SURG PRIVATE ROOM

## 2025-03-14 PROCEDURE — 25000003 PHARM REV CODE 250: Performed by: FAMILY MEDICINE

## 2025-03-14 RX ORDER — IBUPROFEN 200 MG
24 TABLET ORAL
Status: DISCONTINUED | OUTPATIENT
Start: 2025-03-14 | End: 2025-03-20 | Stop reason: HOSPADM

## 2025-03-14 RX ORDER — HYDRALAZINE HYDROCHLORIDE 20 MG/ML
10 INJECTION INTRAMUSCULAR; INTRAVENOUS EVERY 6 HOURS PRN
Status: DISCONTINUED | OUTPATIENT
Start: 2025-03-14 | End: 2025-03-20 | Stop reason: HOSPADM

## 2025-03-14 RX ORDER — IBUPROFEN 200 MG
16 TABLET ORAL
Status: DISCONTINUED | OUTPATIENT
Start: 2025-03-14 | End: 2025-03-20 | Stop reason: HOSPADM

## 2025-03-14 RX ORDER — COLCHICINE 0.6 MG/1
0.6 TABLET, FILM COATED ORAL DAILY
Status: DISCONTINUED | OUTPATIENT
Start: 2025-03-14 | End: 2025-03-20 | Stop reason: HOSPADM

## 2025-03-14 RX ORDER — PREDNISONE 5 MG/1
5 TABLET ORAL DAILY
Status: DISCONTINUED | OUTPATIENT
Start: 2025-03-14 | End: 2025-03-14

## 2025-03-14 RX ORDER — METOPROLOL SUCCINATE 25 MG/1
25 TABLET, EXTENDED RELEASE ORAL DAILY
Status: DISCONTINUED | OUTPATIENT
Start: 2025-03-14 | End: 2025-03-20 | Stop reason: HOSPADM

## 2025-03-14 RX ORDER — MORPHINE SULFATE 4 MG/ML
4 INJECTION, SOLUTION INTRAMUSCULAR; INTRAVENOUS EVERY 6 HOURS PRN
Refills: 0 | Status: DISCONTINUED | OUTPATIENT
Start: 2025-03-14 | End: 2025-03-20 | Stop reason: HOSPADM

## 2025-03-14 RX ORDER — GLUCAGON 1 MG
1 KIT INJECTION
Status: DISCONTINUED | OUTPATIENT
Start: 2025-03-14 | End: 2025-03-20 | Stop reason: HOSPADM

## 2025-03-14 RX ORDER — CALCITRIOL 0.25 UG/1
0.25 CAPSULE ORAL DAILY
Status: DISCONTINUED | OUTPATIENT
Start: 2025-03-14 | End: 2025-03-20 | Stop reason: HOSPADM

## 2025-03-14 RX ORDER — HYDROCODONE BITARTRATE AND ACETAMINOPHEN 7.5; 325 MG/1; MG/1
1 TABLET ORAL EVERY 6 HOURS PRN
Refills: 0 | Status: DISCONTINUED | OUTPATIENT
Start: 2025-03-14 | End: 2025-03-20 | Stop reason: HOSPADM

## 2025-03-14 RX ORDER — TACROLIMUS 1 MG/1
3 CAPSULE ORAL EVERY EVENING
Status: DISCONTINUED | OUTPATIENT
Start: 2025-03-14 | End: 2025-03-17

## 2025-03-14 RX ORDER — FUROSEMIDE 10 MG/ML
40 INJECTION INTRAMUSCULAR; INTRAVENOUS
Status: COMPLETED | OUTPATIENT
Start: 2025-03-14 | End: 2025-03-14

## 2025-03-14 RX ORDER — INSULIN GLARGINE 100 [IU]/ML
15 INJECTION, SOLUTION SUBCUTANEOUS DAILY
Status: DISCONTINUED | OUTPATIENT
Start: 2025-03-14 | End: 2025-03-16

## 2025-03-14 RX ORDER — TACROLIMUS 1 MG/1
4 CAPSULE ORAL EVERY MORNING
Status: DISCONTINUED | OUTPATIENT
Start: 2025-03-14 | End: 2025-03-17

## 2025-03-14 RX ORDER — FAMOTIDINE 20 MG/1
20 TABLET, FILM COATED ORAL DAILY
Status: DISCONTINUED | OUTPATIENT
Start: 2025-03-14 | End: 2025-03-20 | Stop reason: HOSPADM

## 2025-03-14 RX ORDER — LANOLIN ALCOHOL/MO/W.PET/CERES
1 CREAM (GRAM) TOPICAL EVERY OTHER DAY
Status: DISCONTINUED | OUTPATIENT
Start: 2025-03-14 | End: 2025-03-20 | Stop reason: HOSPADM

## 2025-03-14 RX ORDER — ONDANSETRON 4 MG/1
4 TABLET, ORALLY DISINTEGRATING ORAL EVERY 6 HOURS PRN
Status: DISCONTINUED | OUTPATIENT
Start: 2025-03-14 | End: 2025-03-20 | Stop reason: HOSPADM

## 2025-03-14 RX ORDER — FUROSEMIDE 40 MG/1
40 TABLET ORAL DAILY
Status: DISCONTINUED | OUTPATIENT
Start: 2025-03-14 | End: 2025-03-16

## 2025-03-14 RX ORDER — ASPIRIN 81 MG/1
81 TABLET ORAL DAILY
Status: DISCONTINUED | OUTPATIENT
Start: 2025-03-14 | End: 2025-03-20 | Stop reason: HOSPADM

## 2025-03-14 RX ORDER — ACETAMINOPHEN 325 MG/1
650 TABLET ORAL EVERY 6 HOURS PRN
Status: DISCONTINUED | OUTPATIENT
Start: 2025-03-14 | End: 2025-03-20 | Stop reason: HOSPADM

## 2025-03-14 RX ORDER — INSULIN ASPART 100 [IU]/ML
0-5 INJECTION, SOLUTION INTRAVENOUS; SUBCUTANEOUS
Status: DISCONTINUED | OUTPATIENT
Start: 2025-03-14 | End: 2025-03-16

## 2025-03-14 RX ADMIN — APIXABAN 5 MG: 2.5 TABLET, FILM COATED ORAL at 09:03

## 2025-03-14 RX ADMIN — VANCOMYCIN HYDROCHLORIDE 125 MG: KIT at 06:03

## 2025-03-14 RX ADMIN — VANCOMYCIN HYDROCHLORIDE 125 MG: KIT at 12:03

## 2025-03-14 RX ADMIN — ASPIRIN 81 MG: 81 TABLET, COATED ORAL at 09:03

## 2025-03-14 RX ADMIN — METHYLPREDNISOLONE SODIUM SUCCINATE 40 MG: 40 INJECTION, POWDER, FOR SOLUTION INTRAMUSCULAR; INTRAVENOUS at 06:03

## 2025-03-14 RX ADMIN — VANCOMYCIN HYDROCHLORIDE 125 MG: KIT at 05:03

## 2025-03-14 RX ADMIN — FAMOTIDINE 20 MG: 20 TABLET, FILM COATED ORAL at 09:03

## 2025-03-14 RX ADMIN — CALCITRIOL CAPSULES 0.25 MCG 0.25 MCG: 0.25 CAPSULE ORAL at 09:03

## 2025-03-14 RX ADMIN — METOPROLOL SUCCINATE 25 MG: 25 TABLET, EXTENDED RELEASE ORAL at 09:03

## 2025-03-14 RX ADMIN — FERROUS SULFATE TAB 325 MG (65 MG ELEMENTAL FE) 1 EACH: 325 (65 FE) TAB at 09:03

## 2025-03-14 RX ADMIN — FUROSEMIDE 40 MG: 40 TABLET ORAL at 09:03

## 2025-03-14 RX ADMIN — MORPHINE SULFATE 4 MG: 4 INJECTION INTRAVENOUS at 05:03

## 2025-03-14 RX ADMIN — TACROLIMUS 3 MG: 1 CAPSULE ORAL at 05:03

## 2025-03-14 RX ADMIN — PREDNISONE 5 MG: 5 TABLET ORAL at 09:03

## 2025-03-14 RX ADMIN — INSULIN GLARGINE 15 UNITS: 100 INJECTION, SOLUTION SUBCUTANEOUS at 09:03

## 2025-03-14 RX ADMIN — FUROSEMIDE 40 MG: 10 INJECTION, SOLUTION INTRAMUSCULAR; INTRAVENOUS at 01:03

## 2025-03-14 RX ADMIN — TACROLIMUS 4 MG: 1 CAPSULE ORAL at 09:03

## 2025-03-14 RX ADMIN — COLCHICINE 0.6 MG: 0.6 TABLET ORAL at 06:03

## 2025-03-14 NOTE — TELEPHONE ENCOUNTER
Rosio from  called stated that she seen pt on yesterday. Pt been having pain and he think its gout. He is now at the ER because of the pain. She also stated that he have been having trouble swallowing. Pt will follow up with us when he leaves.

## 2025-03-14 NOTE — SUBJECTIVE & OBJECTIVE
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 RNA NEGATIVE 5/2015, 2021, 2022    History of colon polyps     HPTH (hyperparathyroidism)     Hyperlipidemia     Hypertension associated with stage 3 chronic kidney disease due to type 2 diabetes mellitus     LBBB (left bundle branch block) 12/20/2021    Morbid obesity with BMI of 45.0-49.9, adult     Nephrolithiasis 6/7/2013    PCO (posterior capsular opacification), left 03/04/2019    Proteinuria     resolved s/p transplant    S/P kidney transplant     Sleep apnea     Type 2 diabetes, uncontrolled, with retinopathy     Type II diabetes mellitus with renal manifestations        Past Surgical History:   Procedure Laterality Date    CARDIAC CATHETERIZATION  01/01/2008    normal coronary    CARPAL TUNNEL RELEASE Right 12/01/2023    Procedure: RELEASE, CARPAL TUNNEL;  Surgeon: Noel Almonte MD;  Location: [facility] OR;  Service: Orthopedics;  Laterality: Right;    CARPAL TUNNEL RELEASE Left 7/18/2024    Procedure: RELEASE, CARPAL TUNNEL;  Surgeon: Noel Almonte MD;  Location: [facility] OR;  Service: Orthopedics;  Laterality: Left;    COLONOSCOPY N/A 04/05/2018    Procedure: COLONOSCOPY;  Surgeon: Chava Ronquillo MD;  Location: [facility] ENDO;   Service: Endoscopy;  Laterality: N/A;    COLONOSCOPY N/A 05/02/2022    Procedure: COLONOSCOPY;  Surgeon: Alix Puente MD;  Location: [facility];  Service: Endoscopy;  Laterality: N/A;    COLONOSCOPY N/A 06/07/2023    Procedure: COLONOSCOPY - rule out CMV  Cardiac clearance/Eliquis hold approval received on 05/21/23 per Dr. Meade, cardiology.  Note in encounters.  LB;  Surgeon: Daniella Shah MD;  Location: [facility];  Service: Endoscopy;  Laterality: N/A;    ESOPHAGOGASTRODUODENOSCOPY N/A 03/26/2024    Procedure: EGD (ESOPHAGOGASTRODUODENOSCOPY) 3/1-pt cleared, ok to hold eliquis for 3 days;  Surgeon: Daniella Shah MD;  Location: [facility];  Service: Endoscopy;  Laterality: N/A;    KIDNEY TRANSPLANT  2015    RETINAL LASER PROCEDURE         Review of patient's allergies indicates:   Allergen Reactions    Lisinopril Other (See Comments)     Other reaction(s):  cough    Actos  [pioglitazone] Other (See Comments)     Other reaction(s): CHF    Metformin Other (See Comments)     Other reaction(s): renal insuff  Other reaction(s): CHF       No current facility-administered medications on file prior to encounter.     Current Outpatient Medications on File Prior to Encounter   Medication Sig    albuterol (PROVENTIL) 2.5 mg /3 mL (0.083 %) nebulizer solution Take 3 mLs (2.5 mg total) by nebulization every 4 to 6 hours as needed for Wheezing or Shortness of Breath.    albuterol (PROVENTIL/VENTOLIN HFA) 90 mcg/actuation inhaler Inhale 2 puffs into the lungs every 4 (four) hours as needed for Wheezing or Shortness of Breath.    amitriptyline (ELAVIL) 25 MG tablet Take 1 tablet (25 mg total) by mouth nightly as needed for Insomnia. (Patient not taking: Reported on 3/13/2025)    apixaban (ELIQUIS) 5 mg Tab Take 1 tablet (5 mg total) by mouth 2 (two) times daily. (Patient not taking: Reported on 3/13/2025)    aspirin (ECOTRIN) 81 MG EC tablet Take 1 tablet by mouth Daily.     calcitRIOL (ROCALTROL) 0.25 MCG Cap  "Take 1 capsule (0.25 mcg total) by mouth once daily.    cholestyramine (QUESTRAN) 4 gram packet Take 1 packet (4 g total) by mouth 2 (two) times daily. for 9 days    famotidine (PEPCID) 20 MG tablet TAKE ONE TABLET BY MOUTH EVERY EVENING    ferrous sulfate (FEOSOL) 325 mg (65 mg iron) Tab tablet Take 1 tablet (325 mg total) by mouth daily with breakfast.    fish oil-omega-3 fatty acids 300-1,000 mg capsule Take 1 g by mouth once daily.    furosemide (LASIX) 40 MG tablet Take 1 tablet (40 mg total) by mouth daily as needed.    HYDROcodone-acetaminophen (NORCO) 5-325 mg per tablet Take 1 tablet by mouth every 6 (six) hours as needed for Pain. (Patient not taking: Reported on 3/13/2025)    insulin aspart U-100 (NOVOLOG) 100 unit/mL (3 mL) InPn pen Inject 0-10 Units into the skin before meals and at bedtime as needed (Hyperglycemia). **MODERATE CORRECTION DOSE**  Blood Glucose  mg/dL                  Pre-meal                2200  151-200                2 units                    1 unit  201-250                4 units                    2 units  251-300                6 units                    3 units  301-350                8 units                    4 units  >350                     10 units                  5 units  Administer subcutaneously if needed at times designated by monitoring  schedule.  DO NOT HOLD correction dose insulin for patients who are  NPO.    "HIGH ALERT MEDICATION" - Administer with meals or TF/TPN.    insulin glargine U-100, Lantus, (LANTUS SOLOSTAR U-100 INSULIN) 100 unit/mL (3 mL) InPn pen Inject 15 Units into the skin 2 (two) times daily.    ketoconazole (NIZORAL) 200 mg Tab Take ½ tablet (100 mg total) by mouth once daily.    metoprolol succinate (TOPROL-XL) 25 MG 24 hr tablet Take 1 tablet (25 mg total) by mouth once daily.    multivitamin (THERAGRAN) tablet Take 1 tablet by mouth once daily.    [Paused] mycophenolate (CELLCEPT) 250 mg Cap Take 2 capsules (500 mg total) by mouth 2 (two) " times daily. (Patient not taking: Reported on 3/13/2025)    ondansetron (ZOFRAN-ODT) 4 MG TbDL Dissolve 1 tablet (4 mg total) by mouth every 8 (eight) hours as needed (Nausea/vomiting). (Patient not taking: Reported on 3/13/2025)    predniSONE (DELTASONE) 5 MG tablet Take 1 tablet (5 mg total) by mouth once daily.    rosuvastatin (CRESTOR) 40 MG Tab Take 1 tablet (40 mg total) by mouth once daily.    [Paused] sacubitriL-valsartan (ENTRESTO)  mg per tablet Take 1 tablet by mouth 2 (two) times daily. (Patient not taking: Reported on 3/13/2025)    semaglutide (OZEMPIC) 2 mg/dose (8 mg/3 mL) PnIj Inject 2 mg into the skin every 7 days.    sodium bicarbonate 650 MG tablet Take 1 tablet (650 mg total) by mouth 2 (two) times daily.    tacrolimus (PROGRAF) 1 MG Cap Take 4 capsules (4mg) every morning AND take 3 capsules (3mg) by mouth every evening    triamcinolone acetonide 0.1% (KENALOG) 0.1 % ointment Apply topically 2 (two) times daily. Use on bilateral lower legs.    vancomycin (VANCOCIN) 125 MG capsule Take 1 capsule (125 mg total) by mouth 4 (four) times daily. for 8 days     Family History       Problem Relation (Age of Onset)    Diabetes Mother, Sister, Maternal Grandmother    Heart failure Mother, Father    Hypertension Mother    Kidney disease Sister          Tobacco Use    Smoking status: Former     Current packs/day: 0.00     Types: Cigarettes     Quit date: 2013     Years since quittin.8     Passive exposure: Past    Smokeless tobacco: Former     Quit date: 2013    Tobacco comments:     used marijuana since 7115-6343, stopped after started dialysis   Substance and Sexual Activity    Alcohol use: No     Alcohol/week: 0.0 standard drinks of alcohol    Drug use: Not Currently     Comment:      Sexual activity: Never     Review of Systems   Constitutional:  Negative for fatigue and fever.   HENT:  Negative for sinus pressure.    Eyes:  Negative for visual disturbance.   Respiratory:  Negative  for shortness of breath.    Cardiovascular:  Positive for leg swelling. Negative for chest pain.   Gastrointestinal:  Negative for nausea and vomiting.   Genitourinary:  Negative for difficulty urinating.   Musculoskeletal:  Positive for arthralgias and joint swelling. Negative for back pain.   Skin:  Negative for rash.   Neurological:  Negative for headaches.   Psychiatric/Behavioral:  Negative for confusion.      Objective:     Vital Signs (Most Recent):  Temp: 97.6 °F (36.4 °C) (03/14/25 0232)  Pulse: 101 (03/14/25 0313)  Resp: 18 (03/14/25 0232)  BP: 131/82 (03/14/25 0232)  SpO2: 97 % (03/14/25 0232) Vital Signs (24h Range):  Temp:  [97.6 °F (36.4 °C)-98 °F (36.7 °C)] 97.6 °F (36.4 °C)  Pulse:  [] 101  Resp:  [18-34] 18  SpO2:  [97 %-100 %] 97 %  BP: (105-140)/(54-82) 131/82        There is no height or weight on file to calculate BMI.     Physical Exam  Constitutional:       General: He is not in acute distress.     Appearance: He is well-developed. He is obese. He is not diaphoretic.   HENT:      Head: Normocephalic and atraumatic.   Eyes:      Pupils: Pupils are equal, round, and reactive to light.   Cardiovascular:      Rate and Rhythm: Normal rate and regular rhythm.      Heart sounds: Normal heart sounds. No murmur heard.     No friction rub. No gallop.   Pulmonary:      Effort: Pulmonary effort is normal. No respiratory distress.      Breath sounds: Normal breath sounds. No stridor. No wheezing or rales.   Abdominal:      General: Bowel sounds are normal. There is no distension.      Palpations: Abdomen is soft. There is no mass.      Tenderness: There is no abdominal tenderness. There is no guarding.   Musculoskeletal:      Right lower leg: Edema present.      Left lower leg: Edema present.      Comments: Bilateral upper extremity swelling and tenderness   Skin:     General: Skin is warm.      Findings: No erythema.   Neurological:      Mental Status: He is alert and oriented to person, place, and  time.              CRANIAL NERVES     CN III, IV, VI   Pupils are equal, round, and reactive to light.       Significant Labs:   Results for orders placed or performed during the hospital encounter of 03/13/25   HCV Virus Hold Specimen    Collection Time: 03/13/25 11:55 PM   Result Value Ref Range    HEP C Virus Hold Specimen Hold for HCV sendout    CBC auto differential    Collection Time: 03/13/25 11:55 PM   Result Value Ref Range    WBC 4.70 3.90 - 12.70 K/uL    RBC 3.63 (L) 4.60 - 6.20 M/uL    Hemoglobin 9.0 (L) 14.0 - 18.0 g/dL    Hematocrit 29.6 (L) 40.0 - 54.0 %    MCV 82 82 - 98 fL    MCH 24.8 (L) 27.0 - 31.0 pg    MCHC 30.4 (L) 32.0 - 36.0 g/dL    RDW 15.4 (H) 11.5 - 14.5 %    Platelets 192 150 - 450 K/uL    MPV 10.5 9.2 - 12.9 fL    Immature Granulocytes CANCELED 0.0 - 0.5 %    Immature Grans (Abs) CANCELED 0.00 - 0.04 K/uL    nRBC 0 0 /100 WBC    Gran % 76.0 (H) 38.0 - 73.0 %    Lymph % 11.0 (L) 18.0 - 48.0 %    Mono % 12.0 4.0 - 15.0 %    Eosinophil % 0.0 0.0 - 8.0 %    Basophil % 0.0 0.0 - 1.9 %    Bands 1.0 %    Platelet Estimate Appears normal     Poik Slight     Ovalocytes Occasional     Large/Giant Platelets Present     Differential Method Manual    Comprehensive metabolic panel    Collection Time: 03/13/25 11:55 PM   Result Value Ref Range    Sodium 135 (L) 136 - 145 mmol/L    Potassium 4.7 3.5 - 5.1 mmol/L    Chloride 109 95 - 110 mmol/L    CO2 16 (L) 23 - 29 mmol/L    Glucose 174 (H) 70 - 110 mg/dL    BUN 31 (H) 8 - 23 mg/dL    Creatinine 1.8 (H) 0.5 - 1.4 mg/dL    Calcium 9.0 8.7 - 10.5 mg/dL    Total Protein 5.1 (L) 6.0 - 8.4 g/dL    Albumin 2.1 (L) 3.5 - 5.2 g/dL    Total Bilirubin 0.6 0.1 - 1.0 mg/dL    Alkaline Phosphatase 84 40 - 150 U/L    AST 13 10 - 40 U/L    ALT 10 10 - 44 U/L    eGFR 40 (A) >60 mL/min/1.73 m^2    Anion Gap 10 8 - 16 mmol/L   Brain natriuretic peptide    Collection Time: 03/13/25 11:55 PM   Result Value Ref Range     (H) 0 - 99 pg/mL   D dimer, quantitative     Collection Time: 03/13/25 11:55 PM   Result Value Ref Range    D-Dimer 3.53 (H) <0.50 mg/L FEU   Troponin I    Collection Time: 03/13/25 11:55 PM   Result Value Ref Range    Troponin I 0.025 0.000 - 0.026 ng/mL   CPK    Collection Time: 03/13/25 11:55 PM   Result Value Ref Range    CPK 9 (L) 20 - 200 U/L     *Note: Due to a large number of results and/or encounters for the requested time period, some results have not been displayed. A complete set of results can be found in Results Review.        Significant Imaging:   Imaging Results              X-Ray Chest AP Portable (In process)

## 2025-03-14 NOTE — CONSULTS
[facility] Surg  Wound Care    Patient Name:  Mitch Whittaker   MRN:  [id number]  Date: 3/14/2025  Diagnosis: Acute on chronic systolic congestive heart failure    History:     Past Medical History:   Diagnosis Date    Acquired renal cyst of left kidney     Anemia associated with chronic renal failure     CAD (coronary artery disease)     nonobstructive [facility] 9/14    CHF (congestive heart failure)     Chronic immunosuppression with Prograf and MMF 06/18/2015    Chronic venous insufficiency of lower extremity     CKD (chronic kidney disease) stage 3, GFR 30-59 ml/min     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 RNA NEGATIVE 5/2015, 2021, 2022    History of colon polyps     HPTH (hyperparathyroidism)     Hyperlipidemia     Hypertension associated with stage 3 chronic kidney disease due to type 2 diabetes mellitus     LBBB (left bundle branch block) 12/20/2021    Morbid obesity with BMI of 45.0-49.9, adult     Nephrolithiasis 6/7/2013    PCO (posterior capsular opacification), left 03/04/2019    Proteinuria     resolved s/p transplant    S/P kidney transplant     Sleep apnea     Type 2 diabetes, uncontrolled, with retinopathy     Type II diabetes mellitus with renal manifestations        Social History[1]    Precautions:     Allergies as of 03/13/2025 - Reviewed 03/13/2025   Allergen Reaction Noted    Lisinopril Other (See Comments) 04/12/2013    Actos  [pioglitazone] Other (See Comments) 04/12/2013    Metformin Other (See Comments) 04/12/2013       WO Assessment Details/Treatment       Consulted to evaluate wound to R heel and B shins.  Mr. Whittaker is a 68 yo male patient  well-known to wound care staff as we have followed his care during past few hospital stays (I last saw this patient 2/27/15).  Was discharged from [facility]  to home with home health 3/12/25 and then re-admitted 3/13/25 with leg swelling---diagnosed w/ acute on chronic CHF.  PMH includes:  CHF, kidney transplant, CAD, Diabetes, and DVT.    This morning, patient is resting quietly in bed, sleepy but is oriented.  Noted low air-loss pump @ foot of bed but not plugged-in; initiated therapy w/ use of air pump to current pressure redistribution mattress.  Family member @ bedside.  Noted BLE's remains w/ slight edema, scarring and hemosiderin staining.    --New small venous ulcerations to RLE (lateral aspect) and LLE (anterior shin).  Both sites are partial thickness.  Wound to R leg is clean and dark pink in color--minimal serous exudate; wound to L shin is dry w/ dark red to black colored firmly attached crust--no exudate noted.  Recommend:  application of Cavilon skin barrier to protect periwound skin; application of foam barrier dressing to be changed per nursing staff twice/week & prn excess exudate.    Removed foam border dressings to B heels.  Noted no open wound or any redness to either heel @ this time.  Do NOT reposition patient to assess skin to sacrum @ this time.  Recommend:  application of Sween 24 cream to be applied to dry skin of BLE's daily after bath & prn.  Initiated pressure injury prevention orders.  Sent message to primary nurse via [employer] secure chat to make sure offloading boots are obtained and applied to BLE's as patient needs to wear these @ all times when in bed.      Plan f/u in 1 week.       03/14/25 1015   WOCN Assessment   WOCN Total Time (mins) 45   Visit Date 03/14/25   Visit Time 1015   Consult Type Follow Up   WOCN Speciality Wound   Wound venous   Number of Wounds 2   Intervention assessed;applied;chart review;coordination of care   Teaching on-going   Skin Interventions    Device Skin Pressure Protection absorbent pad utilized/changed   Pressure Reduction Devices alternating pressure pump (TANYA);pressure-redistributing mattress utilized   Pressure Reduction Techniques frequent weight shift encouraged   Skin Protection incontinence pads utilized   Positioning   Body Position neutral body alignment   Head of Bed (HOB) Positioning HOB elevated   Positioning/Transfer Devices pillows;in use   [REMOVED]      Wound 03/01/25 1901 Pressure Injury Right Heel   Final Assessment Date/Final Assessment Time: 03/14/25 1015  Date First Assessed/Time First Assessed: 03/01/25 1901   Present on Original Admission: Yes  Primary Wound Type: Pressure Injury  Side: Right  Location: Heel  Is this injury device related?: ...   Wound Image    Appearance Intact;Closed/resurfaced   Care Applied:;Skin Barrier        Wound 03/14/25 1015 Venous Ulcer Right lower;lateral Leg   Date First Assessed/Time First Assessed: 03/14/25 1015   Present on Original Admission: Yes  Primary Wound Type: Venous Ulcer  Side: Right  Orientation: lower;lateral  Location: Leg   Wound Image    Dressing Appearance Open to air   Drainage Amount Scant   Drainage Characteristics/Odor Serous   Appearance Pink   Tissue loss description Partial thickness   Red (%), Wound Tissue Color 100 %   Periwound Area Intact;Edematous;Hemosiderin Staining;Blistered   Wound Edges Open   Wound Length (cm) 0.6 cm   Wound Width (cm) 0.3 cm   Wound Depth (cm) 0.1 cm   Wound Volume (cm^3) 0.009 cm^3   Wound Surface Area (cm^2) 0.14 cm^2   Care Cleansed with:;Sterile normal saline;Applied:;Skin Barrier   Dressing Applied;Foam;Island/border   Dressing Change Due 03/18/25        Wound 03/14/25 1015 Venous Ulcer Left lower;anterior Leg   Date First Assessed/Time First Assessed: 03/14/25 1015   Present on Original Admission: Yes  Primary Wound Type: Venous Ulcer  Side: Left  Orientation: lower;anterior  Location: Leg   Wound Image    Dressing Appearance Open to air    Drainage Amount None   Appearance Intact  (dark red/black crust firmly attached)   Tissue loss description Partial thickness   Wound Length (cm) 0.3 cm   Wound Width (cm) 0.8 cm   Wound Depth (cm) 0.1 cm   Wound Volume (cm^3) 0.013 cm^3   Wound Surface Area (cm^2) 0.19 cm^2   Care Cleansed with:;Sterile normal saline;Applied:;Skin Barrier   Dressing Applied;Foam;Island/border   Dressing Change Due 25         [1]   Social History  Socioeconomic History    Marital status: Legally     Number of children: 2   Occupational History    Occupation: retired     Employer: Retired   Tobacco Use    Smoking status: Former     Current packs/day: 0.00     Types: Cigarettes     Quit date: 2013     Years since quittin.8     Passive exposure: Past    Smokeless tobacco: Former     Quit date: 2013    Tobacco comments:     used marijuana since [id number], stopped after started dialysis   Substance and Sexual Activity    Alcohol use: No     Alcohol/week: 0.0 standard drinks of alcohol    Drug use: Not Currently     Comment:      Sexual activity: Never   Social History Narrative    . Lives with spouse. Has 2 children. Patient retired as  for [employer]. He has been washing cars.     Social Drivers of Health     Financial Resource Strain: Low Risk  (2025)    Overall Financial Resource Strain (CARDIA)     Difficulty of Paying Living Expenses: Not hard at all   Food Insecurity: No Food Insecurity (2025)    Hunger Vital Sign     Worried About Running Out of Food in the Last Year: Never true     Ran Out of Food in the Last Year: Never true   Transportation Needs: No Transportation Needs (2025)    Received from [facility] Transportation     Lack of Transportation (Medical): No     Lack of Transportation (Non-Medical): No   Physical Activity: Inactive (2025)    Exercise Vital Sign     Days of Exercise per Week: 0  days     Minutes of Exercise per Session: 0 min   Stress: No Stress Concern Present (2/26/2025)    [employer] [place] [employer] - Occupational Stress Questionnaire     Feeling of Stress : Not at all   Housing Stability: Low Risk  (2/26/2025)    Housing Stability Vital Sign     Unable to Pay for Housing in the Last Year: No     Homeless in the Last Year: No

## 2025-03-14 NOTE — H&P
[facility] [place] [facility]  History & Physical    Patient Name: Mitch Whittaker  MRN: [id number]  Patient Class: OP- Observation  Admission Date: 3/13/2025  Attending Physician: Long Payan MD  Primary Care Provider: Valery Caal MD         Patient information was obtained from patient and ER records.     Subjective:     Principal Problem:Acute on chronic systolic congestive heart failure    Chief Complaint:   Chief Complaint   Patient presents with    Leg Swelling     Pt presents to ER with pain and swelling to bilateral legs with no improvement. Pt was seen here yesterday for the same issue. Pt is kidney transplant pt and is currently on vancomycin and lasix.         HPI: Patient is a 69-year-old  male with PMH of CHF, kidney transplant, CAD, DM, DVT who presents to the ED for complaints of pain and swelling to bilateral arms.  Of note patient was recently discharged on 03/12/2025 after being treated for ANGELITO and UTI.  Patient developed gross hematuria during stay and Eliquis was held.  Hematuria resolved and Eliquis was resumed prior to DC.  Patient tested positive for C diff and oral vancomycin was initiated.  Plan initially was for discharge back to [facility] however patient was unable to provide financials and was discharged home with home health.  Patient states since being discharged he has developed bilateral edema to his arms.  Tenderness also reported.  Denies any fever.  Patient still complains of diarrhea.    In the ED, H&H 9/29.6, BUN/CR 31/1.8, , D-dimer 3.5.  Chest x-ray showed mild vascular congestion.  Lasix 40 mg initiated.    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 RNA NEGATIVE 5/2015, 2021, 2022    History of colon polyps     HPTH (hyperparathyroidism)     Hyperlipidemia     Hypertension associated with stage 3 chronic kidney disease due to type 2 diabetes mellitus     LBBB (left bundle branch block) 12/20/2021    Morbid obesity with BMI of 45.0-49.9, adult     Nephrolithiasis 6/7/2013    PCO (posterior capsular opacification), left 03/04/2019    Proteinuria     resolved s/p transplant    S/P kidney transplant     Sleep apnea     Type 2 diabetes, uncontrolled, with retinopathy     Type II diabetes mellitus with renal manifestations        Past Surgical History:   Procedure Laterality Date    CARDIAC CATHETERIZATION  01/01/2008    normal coronary    CARPAL TUNNEL RELEASE Right 12/01/2023    Procedure: RELEASE, CARPAL TUNNEL;  Surgeon: Noel Almonte MD;  Location: [facility] OR;  Service: Orthopedics;  Laterality: Right;    CARPAL TUNNEL RELEASE Left 7/18/2024    Procedure: RELEASE, CARPAL TUNNEL;  Surgeon: Noel Almonte MD;  Location: [facility] OR;  Service: Orthopedics;  Laterality: Left;    COLONOSCOPY N/A 04/05/2018    Procedure: COLONOSCOPY;  Surgeon: Chava Ronquillo MD;  Location: [facility] ENDO;  Service: Endoscopy;  Laterality: N/A;    COLONOSCOPY N/A 05/02/2022    Procedure: COLONOSCOPY;  Surgeon: Alix Puente MD;  Location: [facility] ENDO;  Service: Endoscopy;  Laterality: N/A;    COLONOSCOPY N/A 06/07/2023    Procedure: COLONOSCOPY - rule out CMV  Cardiac clearance/Eliquis hold approval received on 05/21/23 per Dr. Meade,  cardiology.  Note in encounters.  LB;  Surgeon: Daniella Shah MD;  Location: [facility];  Service: Endoscopy;  Laterality: N/A;    ESOPHAGOGASTRODUODENOSCOPY N/A 03/26/2024    Procedure: EGD (ESOPHAGOGASTRODUODENOSCOPY) 3/1-pt cleared, ok to hold eliquis for 3 days;  Surgeon: Daniella Shah MD;  Location: [facility];  Service: Endoscopy;  Laterality: N/A;    KIDNEY TRANSPLANT  2015    RETINAL LASER PROCEDURE         Review of patient's allergies indicates:   Allergen Reactions    Lisinopril Other (See Comments)     Other reaction(s):  cough    Actos  [pioglitazone] Other (See Comments)     Other reaction(s): CHF    Metformin Other (See Comments)     Other reaction(s): renal insuff  Other reaction(s): CHF       No current facility-administered medications on file prior to encounter.     Current Outpatient Medications on File Prior to Encounter   Medication Sig    albuterol (PROVENTIL) 2.5 mg /3 mL (0.083 %) nebulizer solution Take 3 mLs (2.5 mg total) by nebulization every 4 to 6 hours as needed for Wheezing or Shortness of Breath.    albuterol (PROVENTIL/VENTOLIN HFA) 90 mcg/actuation inhaler Inhale 2 puffs into the lungs every 4 (four) hours as needed for Wheezing or Shortness of Breath.    amitriptyline (ELAVIL) 25 MG tablet Take 1 tablet (25 mg total) by mouth nightly as needed for Insomnia. (Patient not taking: Reported on 3/13/2025)    apixaban (ELIQUIS) 5 mg Tab Take 1 tablet (5 mg total) by mouth 2 (two) times daily. (Patient not taking: Reported on 3/13/2025)    aspirin (ECOTRIN) 81 MG EC tablet Take 1 tablet by mouth Daily.     calcitRIOL (ROCALTROL) 0.25 MCG Cap Take 1 capsule (0.25 mcg total) by mouth once daily.    cholestyramine (QUESTRAN) 4 gram packet Take 1 packet (4 g total) by mouth 2 (two) times daily. for 9 days    famotidine (PEPCID) 20 MG tablet TAKE ONE TABLET BY MOUTH EVERY EVENING    ferrous sulfate (FEOSOL) 325 mg (65 mg iron) Tab tablet Take 1 tablet (325 mg total) by mouth daily  "with breakfast.    fish oil-omega-3 fatty acids 300-1,000 mg capsule Take 1 g by mouth once daily.    furosemide (LASIX) 40 MG tablet Take 1 tablet (40 mg total) by mouth daily as needed.    HYDROcodone-acetaminophen (NORCO) 5-325 mg per tablet Take 1 tablet by mouth every 6 (six) hours as needed for Pain. (Patient not taking: Reported on 3/13/2025)    insulin aspart U-100 (NOVOLOG) 100 unit/mL (3 mL) InPn pen Inject 0-10 Units into the skin before meals and at bedtime as needed (Hyperglycemia). **MODERATE CORRECTION DOSE**  Blood Glucose  mg/dL                  Pre-meal                2200  151-200                2 units                    1 unit  201-250                4 units                    2 units  251-300                6 units                    3 units  301-350                8 units                    4 units  >350                     10 units                  5 units  Administer subcutaneously if needed at times designated by monitoring  schedule.  DO NOT HOLD correction dose insulin for patients who are  NPO.    "HIGH ALERT MEDICATION" - Administer with meals or TF/TPN.    insulin glargine U-100, Lantus, (LANTUS SOLOSTAR U-100 INSULIN) 100 unit/mL (3 mL) InPn pen Inject 15 Units into the skin 2 (two) times daily.    ketoconazole (NIZORAL) 200 mg Tab Take ½ tablet (100 mg total) by mouth once daily.    metoprolol succinate (TOPROL-XL) 25 MG 24 hr tablet Take 1 tablet (25 mg total) by mouth once daily.    multivitamin (THERAGRAN) tablet Take 1 tablet by mouth once daily.    [Paused] mycophenolate (CELLCEPT) 250 mg Cap Take 2 capsules (500 mg total) by mouth 2 (two) times daily. (Patient not taking: Reported on 3/13/2025)    ondansetron (ZOFRAN-ODT) 4 MG TbDL Dissolve 1 tablet (4 mg total) by mouth every 8 (eight) hours as needed (Nausea/vomiting). (Patient not taking: Reported on 3/13/2025)    predniSONE (DELTASONE) 5 MG tablet Take 1 tablet (5 mg total) by mouth once daily.    rosuvastatin (CRESTOR) 40 " MG Tab Take 1 tablet (40 mg total) by mouth once daily.    [Paused] sacubitriL-valsartan (ENTRESTO)  mg per tablet Take 1 tablet by mouth 2 (two) times daily. (Patient not taking: Reported on 3/13/2025)    semaglutide (OZEMPIC) 2 mg/dose (8 mg/3 mL) PnIj Inject 2 mg into the skin every 7 days.    sodium bicarbonate 650 MG tablet Take 1 tablet (650 mg total) by mouth 2 (two) times daily.    tacrolimus (PROGRAF) 1 MG Cap Take 4 capsules (4mg) every morning AND take 3 capsules (3mg) by mouth every evening    triamcinolone acetonide 0.1% (KENALOG) 0.1 % ointment Apply topically 2 (two) times daily. Use on bilateral lower legs.    vancomycin (VANCOCIN) 125 MG capsule Take 1 capsule (125 mg total) by mouth 4 (four) times daily. for 8 days     Family History       Problem Relation (Age of Onset)    Diabetes Mother, Sister, Maternal Grandmother    Heart failure Mother, Father    Hypertension Mother    Kidney disease Sister          Tobacco Use    Smoking status: Former     Current packs/day: 0.00     Types: Cigarettes     Quit date: 2013     Years since quittin.8     Passive exposure: Past    Smokeless tobacco: Former     Quit date: 2013    Tobacco comments:     used marijuana since 8820-4028, stopped after started dialysis   Substance and Sexual Activity    Alcohol use: No     Alcohol/week: 0.0 standard drinks of alcohol    Drug use: Not Currently     Comment:      Sexual activity: Never     Review of Systems   Constitutional:  Negative for fatigue and fever.   HENT:  Negative for sinus pressure.    Eyes:  Negative for visual disturbance.   Respiratory:  Negative for shortness of breath.    Cardiovascular:  Positive for leg swelling. Negative for chest pain.   Gastrointestinal:  Negative for nausea and vomiting.   Genitourinary:  Negative for difficulty urinating.   Musculoskeletal:  Positive for arthralgias and joint swelling. Negative for back pain.   Skin:  Negative for rash.   Neurological:   Negative for headaches.   Psychiatric/Behavioral:  Negative for confusion.      Objective:     Vital Signs (Most Recent):  Temp: 97.6 °F (36.4 °C) (03/14/25 0232)  Pulse: 101 (03/14/25 0313)  Resp: 18 (03/14/25 0232)  BP: 131/82 (03/14/25 0232)  SpO2: 97 % (03/14/25 0232) Vital Signs (24h Range):  Temp:  [97.6 °F (36.4 °C)-98 °F (36.7 °C)] 97.6 °F (36.4 °C)  Pulse:  [] 101  Resp:  [18-34] 18  SpO2:  [97 %-100 %] 97 %  BP: (105-140)/(54-82) 131/82        There is no height or weight on file to calculate BMI.     Physical Exam  Constitutional:       General: He is not in acute distress.     Appearance: He is well-developed. He is obese. He is not diaphoretic.   HENT:      Head: Normocephalic and atraumatic.   Eyes:      Pupils: Pupils are equal, round, and reactive to light.   Cardiovascular:      Rate and Rhythm: Normal rate and regular rhythm.      Heart sounds: Normal heart sounds. No murmur heard.     No friction rub. No gallop.   Pulmonary:      Effort: Pulmonary effort is normal. No respiratory distress.      Breath sounds: Normal breath sounds. No stridor. No wheezing or rales.   Abdominal:      General: Bowel sounds are normal. There is no distension.      Palpations: Abdomen is soft. There is no mass.      Tenderness: There is no abdominal tenderness. There is no guarding.   Musculoskeletal:      Right lower leg: Edema present.      Left lower leg: Edema present.      Comments: Bilateral upper extremity swelling and tenderness   Skin:     General: Skin is warm.      Findings: No erythema.   Neurological:      Mental Status: He is alert and oriented to person, place, and time.              CRANIAL NERVES     CN III, IV, VI   Pupils are equal, round, and reactive to light.       Significant Labs:   Results for orders placed or performed during the hospital encounter of 03/13/25   HCV Virus Hold Specimen    Collection Time: 03/13/25 11:55 PM   Result Value Ref Range    HEP C Virus Hold Specimen Hold for  HCV sendout    CBC auto differential    Collection Time: 03/13/25 11:55 PM   Result Value Ref Range    WBC 4.70 3.90 - 12.70 K/uL    RBC 3.63 (L) 4.60 - 6.20 M/uL    Hemoglobin 9.0 (L) 14.0 - 18.0 g/dL    Hematocrit 29.6 (L) 40.0 - 54.0 %    MCV 82 82 - 98 fL    MCH 24.8 (L) 27.0 - 31.0 pg    MCHC 30.4 (L) 32.0 - 36.0 g/dL    RDW 15.4 (H) 11.5 - 14.5 %    Platelets 192 150 - 450 K/uL    MPV 10.5 9.2 - 12.9 fL    Immature Granulocytes CANCELED 0.0 - 0.5 %    Immature Grans (Abs) CANCELED 0.00 - 0.04 K/uL    nRBC 0 0 /100 WBC    Gran % 76.0 (H) 38.0 - 73.0 %    Lymph % 11.0 (L) 18.0 - 48.0 %    Mono % 12.0 4.0 - 15.0 %    Eosinophil % 0.0 0.0 - 8.0 %    Basophil % 0.0 0.0 - 1.9 %    Bands 1.0 %    Platelet Estimate Appears normal     Poik Slight     Ovalocytes Occasional     Large/Giant Platelets Present     Differential Method Manual    Comprehensive metabolic panel    Collection Time: 03/13/25 11:55 PM   Result Value Ref Range    Sodium 135 (L) 136 - 145 mmol/L    Potassium 4.7 3.5 - 5.1 mmol/L    Chloride 109 95 - 110 mmol/L    CO2 16 (L) 23 - 29 mmol/L    Glucose 174 (H) 70 - 110 mg/dL    BUN 31 (H) 8 - 23 mg/dL    Creatinine 1.8 (H) 0.5 - 1.4 mg/dL    Calcium 9.0 8.7 - 10.5 mg/dL    Total Protein 5.1 (L) 6.0 - 8.4 g/dL    Albumin 2.1 (L) 3.5 - 5.2 g/dL    Total Bilirubin 0.6 0.1 - 1.0 mg/dL    Alkaline Phosphatase 84 40 - 150 U/L    AST 13 10 - 40 U/L    ALT 10 10 - 44 U/L    eGFR 40 (A) >60 mL/min/1.73 m^2    Anion Gap 10 8 - 16 mmol/L   Brain natriuretic peptide    Collection Time: 03/13/25 11:55 PM   Result Value Ref Range     (H) 0 - 99 pg/mL   D dimer, quantitative    Collection Time: 03/13/25 11:55 PM   Result Value Ref Range    D-Dimer 3.53 (H) <0.50 mg/L FEU   Troponin I    Collection Time: 03/13/25 11:55 PM   Result Value Ref Range    Troponin I 0.025 0.000 - 0.026 ng/mL   CPK    Collection Time: 03/13/25 11:55 PM   Result Value Ref Range    CPK 9 (L) 20 - 200 U/L     *Note: Due to a large number  of results and/or encounters for the requested time period, some results have not been displayed. A complete set of results can be found in Results Review.        Significant Imaging:   Imaging Results              X-Ray Chest AP Portable (In process)                     Assessment/Plan:     * Acute on chronic systolic congestive heart failure  Patient has Systolic (HFrEF) heart failure that is Acute on chronic. On presentation their CHF was decompensated. Evidence of decompensated CHF on presentation includes: edema and weight gain. The etiology of their decompensation is likely increased fluid intake. Most recent BNP and echo results are listed below.  Recent Labs     03/13/25  2355   *     Latest ECHO  Results for orders placed during the hospital encounter of 11/26/24    Echo    Interpretation Summary    Left Ventricle: The left ventricle is normal in size. Normal wall thickness. There is concentric hypertrophy. Normal wall motion. Septal motion is consistent with bundle branch block. There is moderately reduced systolic function. Ejection fraction is approximately 35%. Grade I diastolic dysfunction.    Right Ventricle: Normal right ventricular cavity size. Wall thickness is normal. Systolic function is normal.    IVC/SVC: Normal venous pressure at 3 mmHg.    Current Heart Failure Medications  metoprolol succinate (TOPROL-XL) 24 hr tablet 25 mg, Daily, Oral  furosemide tablet 40 mg, Daily, Oral  hydrALAZINE injection 10 mg, Every 6 hours PRN, Intravenous    Plan  - Monitor strict I&Os and daily weights.    - Place on telemetry  - Low sodium diet  - Place on fluid restriction of 1.5 L.   - Cardiology has not been consulted    Will repeat echo  Continue Lasix        C. difficile colitis  Patient is still reports diarrhea   Continue isolation   Vancomycin p.o.      Anemia, chronic disease  Stable   Continue to monitor    CKD (chronic kidney disease) stage 4, GFR 15-29 ml/min  Creatine stable for now. BMP  "reviewed- noted CrCl cannot be calculated (Unknown ideal weight.). according to latest data. Based on current GFR, CKD stage is stage 4 - GFR 15-29.  Monitor UOP and serial BMP and adjust therapy as needed. Renally dose meds. Avoid nephrotoxic medications and procedures.    Chronic anticoagulation  Continue Eliquis      Deep vein thrombosis (DVT) of lower extremity  Continue Eliquis   Lower extremity ultrasound      Type II diabetes mellitus with renal manifestations  Patient's FSGs are controlled on current medication regimen.  Last A1c reviewed-   Lab Results   Component Value Date    HGBA1C 5.7 (H) 12/17/2024     Most recent fingerstick glucose reviewed- No results for input(s): "POCTGLUCOSE" in the last 24 hours.  Current correctional scale  Low  Maintain anti-hyperglycemic dose as follows-   Antihyperglycemics (From admission, onward)      Start     Stop Route Frequency Ordered    03/14/25 0900  insulin glargine U-100 (Lantus) pen 15 Units         -- SubQ Daily 03/14/25 0236    03/14/25 0336  insulin aspart U-100 pen 0-5 Units         -- SubQ Before meals & nightly PRN 03/14/25 0236          Hold Oral hypoglycemics while patient is in the hospital.    Chronic immunosuppression with Prograf, MMF and prednisone  Continue Prograf and CellCept   Hold mycophenolate        VTE Risk Mitigation (From admission, onward)           Ordered     apixaban tablet 5 mg  2 times daily         03/14/25 0236                       On 03/14/2025, patient should be placed in hospital observation services under my care.             Long Payan MD  Department of Hospital Medicine  [facility]          "

## 2025-03-14 NOTE — PLAN OF CARE
[facility]  Discharge Assessment    Primary Care Provider: Valery Caal MD     Discharge Assessment (most recent)       BRIEF DISCHARGE ASSESSMENT - 03/14/25 0900          Discharge Planning    Assessment Type Discharge Planning Brief Assessment     Resource/Environmental Concerns none     Support Systems Spouse/significant other;Children     Equipment Currently Used at Home bedside commode;hospital bed;walker, rolling;slide board     Current Living Arrangements home     Patient/Family Anticipates Transition to home with family     Patient/Family Anticipated Services at Transition none     DME Needed Upon Discharge  none     Discharge Plan A Home with family     Discharge Plan B Home Health                   Anticipated DC disposition: home    Prior level of function: dependent    PCP: Valery Caal MD    Patient is current with [facility] .

## 2025-03-14 NOTE — PLAN OF CARE
03/14/25 1052   Post-Acute Status   Post-Acute Authorization Placement;Home Health   Post-Acute Placement Status Referrals Sent   Coverage [facility]   Discharge Plan   Discharge Plan A Skilled Nursing Facility   Discharge Plan B Home Health;Home with family     CM to send mass blanket referrals to SNF's in the area to see if any will accept patient per MD and supervisor. If none accepting and family still refuses to provide financials plan B will be for patient to return home with family.    MARTITA spoke with Dayna at [facility] regarding the 3 inpatient midnight rule for [facility]. Per [facility] the if the patient remains in observation and gets accepted to SNF he will not need the 3 midnights.    Update 12:55 PM: Daughter spoke with CM supervisor regarding discharge plan. They are in agreement to bring patient home and care for him between the 3 of them. Will continue [facility] and will suggest the NP at home program to attending physician.

## 2025-03-14 NOTE — TELEPHONE ENCOUNTER
----- Message from Zainab sent at 3/14/2025  9:20 AM CDT -----  Contact: Rosio [phone]  Type: Needs Medical AdviceWho Called:  Rosio carl/ [facility] Best Call Back Number: [phone] Information: Pt discharged on wed/ Pt has yudith having swelling and a lot of pain in joint since taking vancomycin 125 mg/5 mL oral solution 125 mg. Rosio requesting eval for speech therapy order. Pls call back and advise

## 2025-03-14 NOTE — ASSESSMENT & PLAN NOTE
Patient has Systolic (HFrEF) heart failure that is Acute on chronic. On presentation their CHF was decompensated. Evidence of decompensated CHF on presentation includes: edema and weight gain. The etiology of their decompensation is likely increased fluid intake. Most recent BNP and echo results are listed below.  Recent Labs     03/13/25  2355   *     Latest ECHO  Results for orders placed during the hospital encounter of 11/26/24    Echo    Interpretation Summary    Left Ventricle: The left ventricle is normal in size. Normal wall thickness. There is concentric hypertrophy. Normal wall motion. Septal motion is consistent with bundle branch block. There is moderately reduced systolic function. Ejection fraction is approximately 35%. Grade I diastolic dysfunction.    Right Ventricle: Normal right ventricular cavity size. Wall thickness is normal. Systolic function is normal.    IVC/SVC: Normal venous pressure at 3 mmHg.    Current Heart Failure Medications  metoprolol succinate (TOPROL-XL) 24 hr tablet 25 mg, Daily, Oral  furosemide tablet 40 mg, Daily, Oral  hydrALAZINE injection 10 mg, Every 6 hours PRN, Intravenous    Plan  - Monitor strict I&Os and daily weights.    - Place on telemetry  - Low sodium diet  - Place on fluid restriction of 1.5 L.   - Cardiology has not been consulted    Will repeat echo  Continue Lasix

## 2025-03-14 NOTE — ASSESSMENT & PLAN NOTE
"Patient's FSGs are controlled on current medication regimen.  Last A1c reviewed-   Lab Results   Component Value Date    HGBA1C 5.7 (H) 12/17/2024     Most recent fingerstick glucose reviewed- No results for input(s): "POCTGLUCOSE" in the last 24 hours.  Current correctional scale  Low  Maintain anti-hyperglycemic dose as follows-   Antihyperglycemics (From admission, onward)      Start     Stop Route Frequency Ordered    03/14/25 0900  insulin glargine U-100 (Lantus) pen 15 Units         -- SubQ Daily 03/14/25 0236    03/14/25 0336  insulin aspart U-100 pen 0-5 Units         -- SubQ Before meals & nightly PRN 03/14/25 0236          Hold Oral hypoglycemics while patient is in the hospital.  "

## 2025-03-14 NOTE — HPI
Patient is a 69-year-old  male with PMH of CHF, kidney transplant, CAD, DM, DVT who presents to the ED for complaints of pain and swelling to bilateral arms.  Of note patient was recently discharged on 03/12/2025 after being treated for ANGELITO and UTI.  Patient developed gross hematuria during stay and Eliquis was held.  Hematuria resolved and Eliquis was resumed prior to DC.  Patient tested positive for C diff and oral vancomycin was initiated.  Plan initially was for discharge back to [facility] however patient was unable to provide financials and was discharged home with home health.  Patient states since being discharged he has developed bilateral edema to his arms.  Tenderness also reported.  Denies any fever.  Patient still complains of diarrhea.    In the ED, H&H 9/29.6, BUN/CR 31/1.8, , D-dimer 3.5.  Chest x-ray showed mild vascular congestion.  Lasix 40 mg initiated.

## 2025-03-14 NOTE — ED PROVIDER NOTES
"SCRIBE #1 NOTE: I, Jon Guaman, am scribing for, and in the presence of, Isaak Restrepo MD. I have scribed the entire note.       History     Chief Complaint   Patient presents with    Leg Swelling     Pt presents to ER with pain and swelling to bilateral legs with no improvement. Pt was seen here yesterday for the same issue. Pt is kidney transplant pt and is currently on vancomycin and lasix.      Review of patient's allergies indicates:   Allergen Reactions    Lisinopril Other (See Comments)     Other reaction(s):  cough    Actos  [pioglitazone] Other (See Comments)     Other reaction(s): CHF    Metformin Other (See Comments)     Other reaction(s): renal insuff  Other reaction(s): CHF         History of Present Illness     HPI    3/13/2025, 11:44 PM  History obtained from the patient, medical records, and wife      History of Present Illness: Mitch Whittaker is a 69 y.o. male patient with a PMHx of T2DM, S/P kidney transplant, LBBB, obesity, HTN, HLD, Hepatitis C, diastolic heart failure, chronic immunosuppression with Prograf and Mmf, chronic venous insufficiency of lower extremity, CAD, and anemia who presents to the Emergency Department for evaluation of BLE swelling which began this morning. Symptoms are constant and "7/10" in severity. Pt reports palpation exacerbates his shoulder, neck, and BUE pain. Associated sxs include bilateral wrist swelling, bilateral ankle swelling, BUE pain, bilateral shoulder pain, and neck pain. Pt reports the pain as a throbbing sensation. Patient denies any CP, abdominal pain, or leg pain. Pt reports he was d/c from [facility] yesterday. Pt is on Vancomycin for C-diff. Pt is on Lasix.  No prior Tx specified.  No further complaints or concerns at this time.       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 RNA NEGATIVE 5/2015, 2021, 2022    History of colon polyps     HPTH (hyperparathyroidism)     Hyperlipidemia     Hypertension associated with stage 3 chronic kidney disease due to type 2 diabetes mellitus     LBBB (left bundle branch block) 12/20/2021    Morbid obesity with BMI of 45.0-49.9, adult     Nephrolithiasis 6/7/2013    PCO (posterior capsular opacification), left 03/04/2019    Proteinuria     resolved s/p transplant    S/P kidney transplant     Sleep apnea     Type 2 diabetes, uncontrolled, with retinopathy     Type II diabetes mellitus with renal manifestations        Past Surgical History:  Past Surgical History:   Procedure Laterality Date    CARDIAC CATHETERIZATION  01/01/2008    normal coronary    CARPAL TUNNEL RELEASE Right 12/01/2023    Procedure: RELEASE, CARPAL TUNNEL;  Surgeon: Noel Almonte MD;  Location: [facility] OR;  Service: Orthopedics;  Laterality: Right;    CARPAL TUNNEL RELEASE Left 7/18/2024    Procedure: RELEASE, CARPAL TUNNEL;  Surgeon: Noel Almonte MD;  Location: [facility] OR;  Service: Orthopedics;  Laterality: Left;    COLONOSCOPY N/A 04/05/2018    Procedure: COLONOSCOPY;  Surgeon: Chava Ronquillo MD;  Location: [facility] ENDO;  Service: Endoscopy;  Laterality: N/A;    COLONOSCOPY N/A 05/02/2022    Procedure: COLONOSCOPY;   Surgeon: Alix Puente MD;  Location: [facility];  Service: Endoscopy;  Laterality: N/A;    COLONOSCOPY N/A 2023    Procedure: COLONOSCOPY - rule out CMV  Cardiac clearance/Eliquis hold approval received on 23 per Dr. Meade, cardiology.  Note in encounters.  LB;  Surgeon: Daniella Shah MD;  Location: [facility];  Service: Endoscopy;  Laterality: N/A;    ESOPHAGOGASTRODUODENOSCOPY N/A 2024    Procedure: EGD (ESOPHAGOGASTRODUODENOSCOPY) 3/1-pt cleared, ok to hold eliquis for 3 days;  Surgeon: Daniella Shah MD;  Location: [facility];  Service: Endoscopy;  Laterality: N/A;    KIDNEY TRANSPLANT  2015    RETINAL LASER PROCEDURE           Family History:  Family History   Problem Relation Name Age of Onset    Diabetes Mother      Hypertension Mother      Heart failure Mother      Heart failure Father      Kidney disease Sister          ESRD    Diabetes Sister      Diabetes Maternal Grandmother      Cancer Neg Hx         Social History:  Social History     Tobacco Use    Smoking status: Former     Current packs/day: 0.00     Types: Cigarettes     Quit date: 2013     Years since quittin.8     Passive exposure: Past    Smokeless tobacco: Former     Quit date: 2013    Tobacco comments:     used marijuana since 2450-3748, stopped after started dialysis   Substance and Sexual Activity    Alcohol use: No     Alcohol/week: 0.0 standard drinks of alcohol    Drug use: Not Currently     Comment:      Sexual activity: Never        Review of Systems     Review of Systems   Constitutional:  Negative for fever.   HENT:  Negative for sore throat.    Respiratory:  Negative for shortness of breath.    Cardiovascular:  Positive for leg swelling (bilateral). Negative for chest pain.   Gastrointestinal:  Negative for abdominal pain and nausea.   Genitourinary:  Negative for dysuria.   Musculoskeletal:  Positive for neck pain. Negative for back pain.        (+) bilateral ankle and wrist  swelling  (+) shoulder pain  (+) BUE pain  (-) leg pain   Skin:  Negative for rash.   Neurological:  Negative for weakness.   Hematological:  Does not bruise/bleed easily.   All other systems reviewed and are negative.       Physical Exam     Initial Vitals [03/13/25 2100]   BP Pulse Resp Temp SpO2   (!) 105/54 99 18 98 °F (36.7 °C) 97 %      MAP       --          Physical Exam  Nursing Notes and Vital Signs Reviewed.  Constitutional: Patient is in no acute distress. Pt is obese.  Head: Atraumatic. Normocephalic.  Eyes: PERRL. EOM intact. Conjunctivae are not pale. No scleral icterus.  ENT: Mucous membranes are moist. Oropharynx is clear and symmetric.    Neck: Supple. Full ROM. No lymphadenopathy.  Cardiovascular: Regular rate. Regular rhythm. No murmurs, rubs, or gallops. Distal pulses are 2+ and symmetric.  Pulmonary/Chest: No respiratory distress. Clear to auscultation bilaterally. No wheezing or rales.  Abdominal: Soft and non-distended.  There is no tenderness.  No rebound, guarding, or rigidity. Good bowel sounds.  Genitourinary: No CVA tenderness.  Musculoskeletal: Moves all extremities. No obvious deformities. 3+ edema BLE. No calf tenderness. Middle cervical tenderness to palpation. BUE muscle groups tender to palpation.   Skin: Warm and dry. Stasis dermatitis present to BLE.  Neurological:  Alert, awake, and appropriate.  Normal speech.  No acute focal neurological deficits are appreciated.  Psychiatric: Normal affect. Good eye contact. Appropriate in content.     ED Course   Procedures  ED Vital Signs:  Vitals:    03/13/25 2100 03/13/25 2300 03/13/25 2341 03/14/25 0000   BP: (!) 105/54 (!) 119/56  137/70   Pulse: 99 98 99 101   Resp: 18 (!) 34  (!) 27   Temp: 98 °F (36.7 °C)      TempSrc: Oral      SpO2: 97% 100%  100%    03/14/25 0047 03/14/25 0100   BP: 138/65    Pulse: 100    Resp: (!) 28 (!) 28   Temp:     TempSrc:     SpO2: 99%        Abnormal Lab Results:  Labs Reviewed   CBC W/ AUTO DIFFERENTIAL -  Abnormal       Result Value    WBC 4.70      RBC 3.63 (*)     Hemoglobin 9.0 (*)     Hematocrit 29.6 (*)     MCV 82      MCH 24.8 (*)     MCHC 30.4 (*)     RDW 15.4 (*)     Platelets 192      MPV 10.5      Immature Granulocytes CANCELED      Immature Grans (Abs) CANCELED      nRBC 0      Gran % 76.0 (*)     Lymph % 11.0 (*)     Mono % 12.0      Eosinophil % 0.0      Basophil % 0.0      Bands 1.0      Platelet Estimate Appears normal      Poik Slight      Ovalocytes Occasional      Large/Giant Platelets Present      Differential Method Manual     COMPREHENSIVE METABOLIC PANEL - Abnormal    Sodium 135 (*)     Potassium 4.7      Chloride 109      CO2 16 (*)     Glucose 174 (*)     BUN 31 (*)     Creatinine 1.8 (*)     Calcium 9.0      Total Protein 5.1 (*)     Albumin 2.1 (*)     Total Bilirubin 0.6      Alkaline Phosphatase 84      AST 13      ALT 10      eGFR 40 (*)     Anion Gap 10     B-TYPE NATRIURETIC PEPTIDE - Abnormal     (*)    D DIMER, QUANTITATIVE - Abnormal    D-Dimer 3.53 (*)    CK - Abnormal    CPK 9 (*)    HEP C VIRUS HOLD SPECIMEN    HEP C Virus Hold Specimen Hold for HCV sendout      Narrative:     Release to patient->Immediate   TROPONIN I    Troponin I 0.025     HEPATITIS C ANTIBODY   HIV 1 / 2 ANTIBODY   URINALYSIS, REFLEX TO URINE CULTURE        All Lab Results:  Results for orders placed or performed during the hospital encounter of 03/13/25   HCV Virus Hold Specimen    Collection Time: 03/13/25 11:55 PM   Result Value Ref Range    HEP C Virus Hold Specimen Hold for HCV sendout    CBC auto differential    Collection Time: 03/13/25 11:55 PM   Result Value Ref Range    WBC 4.70 3.90 - 12.70 K/uL    RBC 3.63 (L) 4.60 - 6.20 M/uL    Hemoglobin 9.0 (L) 14.0 - 18.0 g/dL    Hematocrit 29.6 (L) 40.0 - 54.0 %    MCV 82 82 - 98 fL    MCH 24.8 (L) 27.0 - 31.0 pg    MCHC 30.4 (L) 32.0 - 36.0 g/dL    RDW 15.4 (H) 11.5 - 14.5 %    Platelets 192 150 - 450 K/uL    MPV 10.5 9.2 - 12.9 fL    Immature  Granulocytes CANCELED 0.0 - 0.5 %    Immature Grans (Abs) CANCELED 0.00 - 0.04 K/uL    nRBC 0 0 /100 WBC    Gran % 76.0 (H) 38.0 - 73.0 %    Lymph % 11.0 (L) 18.0 - 48.0 %    Mono % 12.0 4.0 - 15.0 %    Eosinophil % 0.0 0.0 - 8.0 %    Basophil % 0.0 0.0 - 1.9 %    Bands 1.0 %    Platelet Estimate Appears normal     Poik Slight     Ovalocytes Occasional     Large/Giant Platelets Present     Differential Method Manual    Comprehensive metabolic panel    Collection Time: 03/13/25 11:55 PM   Result Value Ref Range    Sodium 135 (L) 136 - 145 mmol/L    Potassium 4.7 3.5 - 5.1 mmol/L    Chloride 109 95 - 110 mmol/L    CO2 16 (L) 23 - 29 mmol/L    Glucose 174 (H) 70 - 110 mg/dL    BUN 31 (H) 8 - 23 mg/dL    Creatinine 1.8 (H) 0.5 - 1.4 mg/dL    Calcium 9.0 8.7 - 10.5 mg/dL    Total Protein 5.1 (L) 6.0 - 8.4 g/dL    Albumin 2.1 (L) 3.5 - 5.2 g/dL    Total Bilirubin 0.6 0.1 - 1.0 mg/dL    Alkaline Phosphatase 84 40 - 150 U/L    AST 13 10 - 40 U/L    ALT 10 10 - 44 U/L    eGFR 40 (A) >60 mL/min/1.73 m^2    Anion Gap 10 8 - 16 mmol/L   Brain natriuretic peptide    Collection Time: 03/13/25 11:55 PM   Result Value Ref Range     (H) 0 - 99 pg/mL   D dimer, quantitative    Collection Time: 03/13/25 11:55 PM   Result Value Ref Range    D-Dimer 3.53 (H) <0.50 mg/L FEU   Troponin I    Collection Time: 03/13/25 11:55 PM   Result Value Ref Range    Troponin I 0.025 0.000 - 0.026 ng/mL   CPK    Collection Time: 03/13/25 11:55 PM   Result Value Ref Range    CPK 9 (L) 20 - 200 U/L     *Note: Due to a large number of results and/or encounters for the requested time period, some results have not been displayed. A complete set of results can be found in Results Review.       Imaging Results:  Imaging Results              X-Ray Chest AP Portable (In process)                      The EKG was ordered, reviewed, and independently interpreted by the ED provider.  Interpretation time: 23:55  Rate: 100 BPM  Rhythm:  Sinus rhythm with  premature atrial complexes.  Interpretation: Right axis deviation. Nonspecific intraventricular block. Cannot rule out Septal infarct, age undetermined. T wave abnormality, consider inferior ischemia. No STEMI.         The Emergency Provider reviewed the vital signs and test results, which are outlined above.     ED Discussion     1:58 AM: Discussed case with Dr. Ora MD ([facility]). Dr. Payan agrees with current care and management of pt and accepts admission.   Admitting Service: Hospital Medicine  Admitting Physician: Dr. Payan  Admit to: Obs/Tele    1:58 AM: Re-evaluated pt.  I have discussed test results, shared treatment plan, and the need for admission with patient/family/caretaker at bedside. Pt and/or family/caretaker express understanding at this time and agree with all information. All questions answered. Pt/caretaker/family member(s) have no further questions or concerns at this time. Pt is ready for admit.    ED Course as of 03/14/25 0203   Thu Mar 13, 2025   2343 DDx includs rhabdomyolysis, kidney failure, electrolyte abnormality, DVT, HF [BA]      ED Course User Index  [BA] Isaak Restrepo MD     Medical Decision Making  Amount and/or Complexity of Data Reviewed  Labs: ordered. Decision-making details documented in ED Course.  Radiology: ordered. Decision-making details documented in ED Course.  ECG/medicine tests: ordered and independent interpretation performed. Decision-making details documented in ED Course.    Risk  Prescription drug management.                ED Medication(s):  Medications   furosemide injection 40 mg (40 mg Intravenous Given 3/14/25 0149)       New Prescriptions    No medications on file               Scribe Attestation:   Scribe #1: I performed the above scribed service and the documentation accurately describes the services I performed. I attest to the accuracy of the note.     Attending:   Physician Attestation Statement for Scribe #1: I, Isaak Restrepo MD, personally  performed the services described in this documentation, as scribed by Jon Guaman, in my presence, and it is both accurate and complete.           Clinical Impression       ICD-10-CM ICD-9-CM   1. Arm swelling  M79.89 729.81   2. Edema  R60.9 782.3   3. Leg swelling  M79.89 729.81   4. Bilateral arm pain  M79.601 729.5    M79.602        Disposition:   Disposition: Placed in Observation  Condition: Stable        Isaak Restrepo MD  03/14/25 0416

## 2025-03-14 NOTE — ASSESSMENT & PLAN NOTE
Creatine stable for now. BMP reviewed- noted CrCl cannot be calculated (Unknown ideal weight.). according to latest data. Based on current GFR, CKD stage is stage 4 - GFR 15-29.  Monitor UOP and serial BMP and adjust therapy as needed. Renally dose meds. Avoid nephrotoxic medications and procedures.

## 2025-03-14 NOTE — PROGRESS NOTES
Pt seen and examined. Chart, imaging reviewed. Briefly, 69-year-old AAM with Hx of CHF, kidney transplant, CAD, DM, DVT who presented to the ED for pain and swelling to bilateral arms. Pt was just discharged on 03/12/2025 after being treated for ANGELITO and UTI and C diff colitis with oral Vanc. Pt developed gross hematuria during stay and Eliquis was held. Hematuria resolved and Eliquis was resumed prior to DC.  Patient tested positive for C diff and oral vancomycin was initiated.  Plan initially was to discharge back to [facility] however patient was unable to provide financials and was discharged home with home health.  Patient states since being discharged he has developed bilateral edema to his arms.  Tenderness also reported.  Denies any fever.  Patient still complains of diarrhea.     In the ED, H&H 9/29.6, BUN/CR 31/1.8, , D-dimer 3.5.  Chest x-ray showed mild vascular congestion.  Lasix 40 mg initiated.     Also suspect mild gout- will give steroids and oral colchicine. Cont oral vanc for C diff.

## 2025-03-15 PROBLEM — M10.9 ACUTE GOUT OF LEFT ELBOW: Status: ACTIVE | Noted: 2025-03-15

## 2025-03-15 LAB
ACANTHOCYTES BLD QL SMEAR: PRESENT
ANION GAP SERPL CALC-SCNC: 14 MMOL/L (ref 8–16)
ANISOCYTOSIS BLD QL SMEAR: SLIGHT
BASOPHILS NFR BLD: 0 % (ref 0–1.9)
BUN SERPL-MCNC: 41 MG/DL (ref 8–23)
CALCIUM SERPL-MCNC: 8.8 MG/DL (ref 8.7–10.5)
CHLORIDE SERPL-SCNC: 106 MMOL/L (ref 95–110)
CO2 SERPL-SCNC: 15 MMOL/L (ref 23–29)
CREAT SERPL-MCNC: 2.1 MG/DL (ref 0.5–1.4)
DIFFERENTIAL METHOD BLD: ABNORMAL
EOSINOPHIL NFR BLD: 0 % (ref 0–8)
ERYTHROCYTE [DISTWIDTH] IN BLOOD BY AUTOMATED COUNT: 15.4 % (ref 11.5–14.5)
EST. GFR  (NO RACE VARIABLE): 33 ML/MIN/1.73 M^2
GLUCOSE SERPL-MCNC: 199 MG/DL (ref 70–110)
HCT VFR BLD AUTO: 33 % (ref 40–54)
HGB BLD-MCNC: 9.7 G/DL (ref 14–18)
HYPOCHROMIA BLD QL SMEAR: ABNORMAL
IMM GRANULOCYTES # BLD AUTO: ABNORMAL K/UL (ref 0–0.04)
IMM GRANULOCYTES NFR BLD AUTO: ABNORMAL % (ref 0–0.5)
LYMPHOCYTES NFR BLD: 8 % (ref 18–48)
MCH RBC QN AUTO: 24.4 PG (ref 27–31)
MCHC RBC AUTO-ENTMCNC: 29.4 G/DL (ref 32–36)
MCV RBC AUTO: 83 FL (ref 82–98)
MONOCYTES NFR BLD: 3 % (ref 4–15)
NEUTROPHILS NFR BLD: 89 % (ref 38–73)
NRBC BLD-RTO: 0 /100 WBC
OVALOCYTES BLD QL SMEAR: ABNORMAL
PLATELET # BLD AUTO: 211 K/UL (ref 150–450)
PLATELET BLD QL SMEAR: ABNORMAL
PMV BLD AUTO: 11.2 FL (ref 9.2–12.9)
POCT GLUCOSE: 220 MG/DL (ref 70–110)
POCT GLUCOSE: 316 MG/DL (ref 70–110)
POCT GLUCOSE: 392 MG/DL (ref 70–110)
POCT GLUCOSE: 462 MG/DL (ref 70–110)
POIKILOCYTOSIS BLD QL SMEAR: SLIGHT
POLYCHROMASIA BLD QL SMEAR: ABNORMAL
POTASSIUM SERPL-SCNC: 4.9 MMOL/L (ref 3.5–5.1)
RBC # BLD AUTO: 3.97 M/UL (ref 4.6–6.2)
SCHISTOCYTES BLD QL SMEAR: PRESENT
SODIUM SERPL-SCNC: 135 MMOL/L (ref 136–145)
TACROLIMUS BLD-MCNC: 6.4 NG/ML (ref 5–15)
TARGETS BLD QL SMEAR: ABNORMAL
WBC # BLD AUTO: 3.2 K/UL (ref 3.9–12.7)

## 2025-03-15 PROCEDURE — 25000003 PHARM REV CODE 250: Performed by: EMERGENCY MEDICINE

## 2025-03-15 PROCEDURE — 80048 BASIC METABOLIC PNL TOTAL CA: CPT | Performed by: FAMILY MEDICINE

## 2025-03-15 PROCEDURE — 85007 BL SMEAR W/DIFF WBC COUNT: CPT | Performed by: FAMILY MEDICINE

## 2025-03-15 PROCEDURE — 25000003 PHARM REV CODE 250: Performed by: FAMILY MEDICINE

## 2025-03-15 PROCEDURE — 11000001 HC ACUTE MED/SURG PRIVATE ROOM

## 2025-03-15 PROCEDURE — 21400001 HC TELEMETRY ROOM

## 2025-03-15 PROCEDURE — 36415 COLL VENOUS BLD VENIPUNCTURE: CPT | Performed by: FAMILY MEDICINE

## 2025-03-15 PROCEDURE — 51798 US URINE CAPACITY MEASURE: CPT

## 2025-03-15 PROCEDURE — 51701 INSERT BLADDER CATHETER: CPT

## 2025-03-15 PROCEDURE — 63600175 PHARM REV CODE 636 W HCPCS: Performed by: EMERGENCY MEDICINE

## 2025-03-15 PROCEDURE — 80197 ASSAY OF TACROLIMUS: CPT | Performed by: EMERGENCY MEDICINE

## 2025-03-15 PROCEDURE — 27000207 HC ISOLATION

## 2025-03-15 PROCEDURE — 63600175 PHARM REV CODE 636 W HCPCS: Performed by: FAMILY MEDICINE

## 2025-03-15 PROCEDURE — 85027 COMPLETE CBC AUTOMATED: CPT | Performed by: FAMILY MEDICINE

## 2025-03-15 RX ORDER — CYANOCOBALAMIN 1000 UG/ML
1000 INJECTION, SOLUTION INTRAMUSCULAR; SUBCUTANEOUS DAILY
Status: COMPLETED | OUTPATIENT
Start: 2025-03-15 | End: 2025-03-16

## 2025-03-15 RX ADMIN — VANCOMYCIN HYDROCHLORIDE 125 MG: KIT at 06:03

## 2025-03-15 RX ADMIN — INSULIN ASPART 4 UNITS: 100 INJECTION, SOLUTION INTRAVENOUS; SUBCUTANEOUS at 11:03

## 2025-03-15 RX ADMIN — VANCOMYCIN HYDROCHLORIDE 125 MG: KIT at 12:03

## 2025-03-15 RX ADMIN — COLCHICINE 0.6 MG: 0.6 TABLET ORAL at 09:03

## 2025-03-15 RX ADMIN — CYANOCOBALAMIN 1000 MCG: 1000 INJECTION INTRAMUSCULAR; SUBCUTANEOUS at 04:03

## 2025-03-15 RX ADMIN — METHYLPREDNISOLONE SODIUM SUCCINATE 40 MG: 40 INJECTION, POWDER, FOR SOLUTION INTRAMUSCULAR; INTRAVENOUS at 06:03

## 2025-03-15 RX ADMIN — TACROLIMUS 4 MG: 1 CAPSULE ORAL at 09:03

## 2025-03-15 RX ADMIN — INSULIN ASPART 2 UNITS: 100 INJECTION, SOLUTION INTRAVENOUS; SUBCUTANEOUS at 06:03

## 2025-03-15 RX ADMIN — VANCOMYCIN HYDROCHLORIDE 125 MG: KIT at 11:03

## 2025-03-15 RX ADMIN — INSULIN ASPART 5 UNITS: 100 INJECTION, SOLUTION INTRAVENOUS; SUBCUTANEOUS at 04:03

## 2025-03-15 RX ADMIN — ONDANSETRON 4 MG: 4 TABLET, ORALLY DISINTEGRATING ORAL at 06:03

## 2025-03-15 RX ADMIN — APIXABAN 5 MG: 2.5 TABLET, FILM COATED ORAL at 09:03

## 2025-03-15 RX ADMIN — INSULIN GLARGINE 15 UNITS: 100 INJECTION, SOLUTION SUBCUTANEOUS at 09:03

## 2025-03-15 RX ADMIN — FUROSEMIDE 40 MG: 40 TABLET ORAL at 09:03

## 2025-03-15 RX ADMIN — TACROLIMUS 3 MG: 1 CAPSULE ORAL at 06:03

## 2025-03-15 RX ADMIN — METOPROLOL SUCCINATE 25 MG: 25 TABLET, EXTENDED RELEASE ORAL at 09:03

## 2025-03-15 RX ADMIN — FAMOTIDINE 20 MG: 20 TABLET, FILM COATED ORAL at 09:03

## 2025-03-15 RX ADMIN — MORPHINE SULFATE 4 MG: 4 INJECTION INTRAVENOUS at 06:03

## 2025-03-15 RX ADMIN — INSULIN ASPART 3 UNITS: 100 INJECTION, SOLUTION INTRAVENOUS; SUBCUTANEOUS at 09:03

## 2025-03-15 RX ADMIN — CALCITRIOL CAPSULES 0.25 MCG 0.25 MCG: 0.25 CAPSULE ORAL at 09:03

## 2025-03-15 RX ADMIN — Medication 1 TABLET: at 04:03

## 2025-03-15 RX ADMIN — ASPIRIN 81 MG: 81 TABLET, COATED ORAL at 09:03

## 2025-03-15 NOTE — HOSPITAL COURSE
69-year-old AAM with Hx of CHF, kidney transplant, CAD, DM, DVT who presented to the ED for pain and swelling to bilateral arms. Pt was just discharged on 03/12/2025 after being treated for ANGELITO and UTI and C diff colitis with oral Vanc. Pt developed gross hematuria during stay and Eliquis was held. Hematuria resolved and Eliquis was resumed prior to DC.  Patient tested positive for C diff and oral vancomycin was initiated.  Plan initially was to discharge back to [facility] however patient was unable to provide financials and was discharged home with home health.  Patient states since being discharged he has developed bilateral edema to his arms.  Tenderness also reported.  Denies any fever.  Patient still complains of diarrhea.     In the ED, H&H 9/29.6, BUN/CR 31/1.8, , D-dimer 3.5.  Chest x-ray showed mild vascular congestion.  Lasix 40 mg initiated.      Also suspect mild gout- will give steroids and oral colchicine. Cont oral vanc for C diff.    3/15- looks and feels better, good response to steroids, BUE pain and swelling improving, able to lift his arms now. Cont present care. Will start PT/OT and start inj b12 plus triple vitamins, d/c home soon.     3/16- looks and feels better, more alert dn responsive, eating with both his hands easily, swelling, pain much better. Bun/Cr still rising, likely from diarrhea from C Diff- will add Questran and start IVF. Also BS was very high last evening sec to Prednisone for his gout- BS better now- will lower Prednisone and cont Lantus plus SSI.    3/17- Appreciate Dr. Mandujano- pt doing well, moving arms well, no diarrhea, eating drinking well. But Cr cleo to 2.9- hence renal consulted and started on IVF- no obvious etiology for the ANGELITO/Cr 2.9. also have halved the doses of the Tacrolimus. Renal following. Also d/w family, they are refusing SNF and will take care of him at home. HH will be ordered.  ESR/CRP very high- likely from his C Diff and Gout.      03/18/2025  Creatinine trending down with gentle IVF.  Continue current management.  Nephrology on case.  Bicarb supplementation.  03/19/2025  Will DC bicarb drip.  Patient appears clinically improving.  Continue current management.  Possible DC tomorrow.    Patient was discharged home with home health. Lasix 80mg x 3 days then lasix 40mg daily.

## 2025-03-15 NOTE — SUBJECTIVE & OBJECTIVE
Interval History: looks and feels better, good response to steroids, BUE pain and swelling improving, able to lift his arms now. Cont present care. Will start PT/OT and start inj b12 plus triple vitamins, d/c home soon.     Review of Systems   Constitutional:  Positive for activity change and appetite change. Negative for fatigue and fever.   HENT:  Negative for sinus pressure.    Eyes:  Negative for visual disturbance.   Respiratory:  Negative for shortness of breath.    Cardiovascular:  Positive for leg swelling. Negative for chest pain.   Gastrointestinal:  Negative for nausea and vomiting.   Genitourinary:  Negative for difficulty urinating.   Musculoskeletal:  Positive for arthralgias and joint swelling. Negative for back pain.   Skin:  Negative for rash.   Neurological:  Positive for weakness. Negative for headaches.   Psychiatric/Behavioral:  Negative for confusion.      Objective:     Vital Signs (Most Recent):  Temp: 97.4 °F (36.3 °C) (03/15/25 1524)  Pulse: 89 (03/15/25 1524)  Resp: 18 (03/15/25 1524)  BP: 137/68 (03/15/25 1524)  SpO2: 97 % (03/15/25 1524) Vital Signs (24h Range):  Temp:  [97.4 °F (36.3 °C)-98.9 °F (37.2 °C)] 97.4 °F (36.3 °C)  Pulse:  [] 89  Resp:  [16-20] 18  SpO2:  [94 %-98 %] 97 %  BP: (116-145)/(61-75) 137/68     Weight: 114.3 kg (252 lb)  Body mass index is 34.18 kg/m².    Intake/Output Summary (Last 24 hours) at 3/15/2025 1528  Last data filed at 3/15/2025 0640  Gross per 24 hour   Intake 240 ml   Output --   Net 240 ml         Physical Exam  Vitals and nursing note reviewed.   Constitutional:       General: He is awake. He is not in acute distress.     Appearance: He is well-developed. He is obese. He is ill-appearing. He is not diaphoretic.   HENT:      Head: Normocephalic and atraumatic.   Eyes:      Pupils: Pupils are equal, round, and reactive to light.   Cardiovascular:      Rate and Rhythm: Normal rate and regular rhythm.      Heart sounds: Normal heart sounds. No  murmur heard.     No friction rub. No gallop.   Pulmonary:      Effort: Pulmonary effort is normal. No respiratory distress.      Breath sounds: Normal breath sounds. No stridor. No wheezing or rales.   Abdominal:      General: Bowel sounds are normal. There is no distension.      Palpations: Abdomen is soft. There is no mass.      Tenderness: There is no abdominal tenderness. There is no guarding.   Musculoskeletal:      Right lower leg: Edema present.      Left lower leg: Edema present.      Comments: Bilateral upper extremity swelling and tenderness   Skin:     General: Skin is warm.      Capillary Refill: Capillary refill takes 2 to 3 seconds.      Findings: No erythema.   Neurological:      Mental Status: He is alert and oriented to person, place, and time.   Psychiatric:         Behavior: Behavior is cooperative.               Significant Labs: All pertinent labs within the past 24 hours have been reviewed.  BMP:   Recent Labs   Lab 03/15/25  0556   *   *   K 4.9      CO2 15*   BUN 41*   CREATININE 2.1*   CALCIUM 8.8     CBC:   Recent Labs   Lab 03/13/25  2355 03/14/25  0515 03/15/25  0556   WBC 4.70 4.59 3.20*   HGB 9.0* 9.2* 9.7*   HCT 29.6* 31.4* 33.0*    197 211     CMP:   Recent Labs   Lab 03/13/25  2355 03/14/25  0515 03/15/25  0556   * 136 135*   K 4.7 4.1 4.9    109 106   CO2 16* 17* 15*   * 133* 199*   BUN 31* 31* 41*   CREATININE 1.8* 1.8* 2.1*   CALCIUM 9.0 8.9 8.8   PROT 5.1*  --   --    ALBUMIN 2.1*  --   --    BILITOT 0.6  --   --    ALKPHOS 84  --   --    AST 13  --   --    ALT 10  --   --    ANIONGAP 10 10 14       Significant Imaging: I have reviewed all pertinent imaging results/findings within the past 24 hours.

## 2025-03-15 NOTE — ASSESSMENT & PLAN NOTE
Creatine stable for now. BMP reviewed- noted Estimated Creatinine Clearance: 42.1 mL/min (A) (based on SCr of 2.1 mg/dL (H)). according to latest data. Based on current GFR, CKD stage is stage 4 - GFR 15-29.  Monitor UOP and serial BMP and adjust therapy as needed. Renally dose meds. Avoid nephrotoxic medications and procedures.

## 2025-03-15 NOTE — PLAN OF CARE
Discussed poc with pt, pt verbalized understanding    Purposeful rounding every 2hours    VS wnl  Cardiac monitoring in use, pt is NSR, tele monitor # [id number]  Blood glucose monitoring, supplemental insulin given as ordered  Fall precautions in place, remains injury free    Abx given as prescribed  Bed locked at lowest position  Call light within reach    Chart check complete  Will cont with POC    Problem: Adult Inpatient Plan of Care  Goal: Plan of Care Review  Outcome: Progressing  Goal: Patient-Specific Goal (Individualized)  Outcome: Progressing  Goal: Absence of Hospital-Acquired Illness or Injury  Outcome: Progressing  Goal: Optimal Comfort and Wellbeing  Outcome: Progressing  Goal: Readiness for Transition of Care  Outcome: Progressing     Problem: Diabetes Comorbidity  Goal: Blood Glucose Level Within Targeted Range  Outcome: Progressing     Problem: Wound  Goal: Optimal Coping  Outcome: Progressing  Goal: Optimal Functional Ability  Outcome: Progressing  Goal: Absence of Infection Signs and Symptoms  Outcome: Progressing  Goal: Improved Oral Intake  Outcome: Progressing  Goal: Optimal Pain Control and Function  Outcome: Progressing  Goal: Skin Health and Integrity  Outcome: Progressing  Goal: Optimal Wound Healing  Outcome: Progressing     Problem: Fall Injury Risk  Goal: Absence of Fall and Fall-Related Injury  Outcome: Progressing     Problem: Skin Injury Risk Increased  Goal: Skin Health and Integrity  Outcome: Progressing     Problem: Infection  Goal: Absence of Infection Signs and Symptoms  Outcome: Progressing

## 2025-03-15 NOTE — ASSESSMENT & PLAN NOTE
Patient's FSGs are controlled on current medication regimen.  Last A1c reviewed-   Lab Results   Component Value Date    HGBA1C 5.7 (H) 12/17/2024     Most recent fingerstick glucose reviewed-   Recent Labs   Lab 03/14/25  1722 03/14/25  2140 03/15/25  0619 03/15/25  1147   POCTGLUCOSE 107 163* 220* 316*     Current correctional scale  Low  Maintain anti-hyperglycemic dose as follows-   Antihyperglycemics (From admission, onward)      Start     Stop Route Frequency Ordered    03/14/25 0900  insulin glargine U-100 (Lantus) pen 15 Units         -- SubQ Daily 03/14/25 0236    03/14/25 0336  insulin aspart U-100 pen 0-5 Units         -- SubQ Before meals & nightly PRN 03/14/25 0236          Hold Oral hypoglycemics while patient is in the hospital.

## 2025-03-15 NOTE — PLAN OF CARE
Plan of care reviewed with with patient with verbal understanding. Chart and orders reviewed. Patient remains free from falls this shift, safety precautions in place. Patient resting comfortably in bed. Pain controlled with as needed pain medication. Purposeful rounding with bed in lowest position, side rails up, call light and personal items within reach. Heart monitor in place. Blood glucose monitoring. Will continue to monitor until the end of shift. No needs at this time.    Problem: Adult Inpatient Plan of Care  Goal: Plan of Care Review  Outcome: Progressing  Goal: Patient-Specific Goal (Individualized)  Outcome: Progressing  Goal: Absence of Hospital-Acquired Illness or Injury  Outcome: Progressing  Goal: Optimal Comfort and Wellbeing  Outcome: Progressing  Goal: Readiness for Transition of Care  Outcome: Progressing     Problem: Diabetes Comorbidity  Goal: Blood Glucose Level Within Targeted Range  Outcome: Progressing     Problem: Wound  Goal: Optimal Coping  Outcome: Progressing  Goal: Optimal Functional Ability  Outcome: Progressing  Goal: Absence of Infection Signs and Symptoms  Outcome: Progressing  Goal: Improved Oral Intake  Outcome: Progressing  Goal: Optimal Pain Control and Function  Outcome: Progressing  Goal: Skin Health and Integrity  Outcome: Progressing  Goal: Optimal Wound Healing  Outcome: Progressing     Problem: Fall Injury Risk  Goal: Absence of Fall and Fall-Related Injury  Outcome: Progressing     Problem: Skin Injury Risk Increased  Goal: Skin Health and Integrity  Outcome: Progressing     Problem: Infection  Goal: Absence of Infection Signs and Symptoms  Outcome: Progressing

## 2025-03-15 NOTE — PROGRESS NOTES
[facility] Medicine  Progress Note    Patient Name: Mitch Whittaker  MRN: [id number]  Patient Class: IP- Inpatient   Admission Date: 3/13/2025  Length of Stay: 1 days  Attending Physician: Jaymie Medley MD  Primary Care Provider: Valery Caal MD        Subjective     Principal Problem:Acute on chronic systolic congestive heart failure        HPI:  Patient is a 69-year-old  male with PMH of CHF, kidney transplant, CAD, DM, DVT who presents to the ED for complaints of pain and swelling to bilateral arms.  Of note patient was recently discharged on 03/12/2025 after being treated for ANGELITO and UTI.  Patient developed gross hematuria during stay and Eliquis was held.  Hematuria resolved and Eliquis was resumed prior to DC.  Patient tested positive for C diff and oral vancomycin was initiated.  Plan initially was for discharge back to [facility] however patient was unable to provide financials and was discharged home with home health.  Patient states since being discharged he has developed bilateral edema to his arms.  Tenderness also reported.  Denies any fever.  Patient still complains of diarrhea.    In the ED, H&H 9/29.6, BUN/CR 31/1.8, , D-dimer 3.5.  Chest x-ray showed mild vascular congestion.  Lasix 40 mg initiated.    Overview/Hospital Course:  69-year-old AAM with Hx of CHF, kidney transplant, CAD, DM, DVT who presented to the ED for pain and swelling to bilateral arms. Pt was just discharged on 03/12/2025 after being treated for ANGELITO and UTI and C diff colitis with oral Vanc. Pt developed gross hematuria during stay and Eliquis was held. Hematuria resolved and Eliquis was resumed prior to DC.  Patient tested positive for C diff and oral vancomycin was initiated.  Plan initially was to discharge back to [facility] however patient was unable to provide financials and was discharged home with home health.  Patient states since being discharged he has developed  bilateral edema to his arms.  Tenderness also reported.  Denies any fever.  Patient still complains of diarrhea.     In the ED, H&H 9/29.6, BUN/CR 31/1.8, , D-dimer 3.5.  Chest x-ray showed mild vascular congestion.  Lasix 40 mg initiated.      Also suspect mild gout- will give steroids and oral colchicine. Cont oral vanc for C diff.    3/15- looks and feels better, good response to steroids, BUE pain and swelling improving, able to lift his arms now. Cont present care. Will start PT/OT and start inj b12 plus triple vitamins, d/c home soon.     Interval History: looks and feels better, good response to steroids, BUE pain and swelling improving, able to lift his arms now. Cont present care. Will start PT/OT and start inj b12 plus triple vitamins, d/c home soon.     Review of Systems   Constitutional:  Positive for activity change and appetite change. Negative for fatigue and fever.   HENT:  Negative for sinus pressure.    Eyes:  Negative for visual disturbance.   Respiratory:  Negative for shortness of breath.    Cardiovascular:  Positive for leg swelling. Negative for chest pain.   Gastrointestinal:  Negative for nausea and vomiting.   Genitourinary:  Negative for difficulty urinating.   Musculoskeletal:  Positive for arthralgias and joint swelling. Negative for back pain.   Skin:  Negative for rash.   Neurological:  Positive for weakness. Negative for headaches.   Psychiatric/Behavioral:  Negative for confusion.      Objective:     Vital Signs (Most Recent):  Temp: 97.4 °F (36.3 °C) (03/15/25 1524)  Pulse: 89 (03/15/25 1524)  Resp: 18 (03/15/25 1524)  BP: 137/68 (03/15/25 1524)  SpO2: 97 % (03/15/25 1524) Vital Signs (24h Range):  Temp:  [97.4 °F (36.3 °C)-98.9 °F (37.2 °C)] 97.4 °F (36.3 °C)  Pulse:  [] 89  Resp:  [16-20] 18  SpO2:  [94 %-98 %] 97 %  BP: (116-145)/(61-75) 137/68     Weight: 114.3 kg (252 lb)  Body mass index is 34.18 kg/m².    Intake/Output Summary (Last 24 hours) at 3/15/2025  1528  Last data filed at 3/15/2025 0640  Gross per 24 hour   Intake 240 ml   Output --   Net 240 ml         Physical Exam  Vitals and nursing note reviewed.   Constitutional:       General: He is awake. He is not in acute distress.     Appearance: He is well-developed. He is obese. He is ill-appearing. He is not diaphoretic.   HENT:      Head: Normocephalic and atraumatic.   Eyes:      Pupils: Pupils are equal, round, and reactive to light.   Cardiovascular:      Rate and Rhythm: Normal rate and regular rhythm.      Heart sounds: Normal heart sounds. No murmur heard.     No friction rub. No gallop.   Pulmonary:      Effort: Pulmonary effort is normal. No respiratory distress.      Breath sounds: Normal breath sounds. No stridor. No wheezing or rales.   Abdominal:      General: Bowel sounds are normal. There is no distension.      Palpations: Abdomen is soft. There is no mass.      Tenderness: There is no abdominal tenderness. There is no guarding.   Musculoskeletal:      Right lower leg: Edema present.      Left lower leg: Edema present.      Comments: Bilateral upper extremity swelling and tenderness   Skin:     General: Skin is warm.      Capillary Refill: Capillary refill takes 2 to 3 seconds.      Findings: No erythema.   Neurological:      Mental Status: He is alert and oriented to person, place, and time.   Psychiatric:         Behavior: Behavior is cooperative.               Significant Labs: All pertinent labs within the past 24 hours have been reviewed.  BMP:   Recent Labs   Lab 03/15/25  0556   *   *   K 4.9      CO2 15*   BUN 41*   CREATININE 2.1*   CALCIUM 8.8     CBC:   Recent Labs   Lab 03/13/25 2355 03/14/25  0515 03/15/25  0556   WBC 4.70 4.59 3.20*   HGB 9.0* 9.2* 9.7*   HCT 29.6* 31.4* 33.0*    197 211     CMP:   Recent Labs   Lab 03/13/25 2355 03/14/25  0515 03/15/25  0556   * 136 135*   K 4.7 4.1 4.9    109 106   CO2 16* 17* 15*   * 133* 199*   BUN  31* 31* 41*   CREATININE 1.8* 1.8* 2.1*   CALCIUM 9.0 8.9 8.8   PROT 5.1*  --   --    ALBUMIN 2.1*  --   --    BILITOT 0.6  --   --    ALKPHOS 84  --   --    AST 13  --   --    ALT 10  --   --    ANIONGAP 10 10 14       Significant Imaging: I have reviewed all pertinent imaging results/findings within the past 24 hours.      Assessment & Plan  Acute on chronic systolic congestive heart failure  Patient has Systolic (HFrEF) heart failure that is Acute on chronic. On presentation their CHF was decompensated. Evidence of decompensated CHF on presentation includes: edema and weight gain. The etiology of their decompensation is likely increased fluid intake. Most recent BNP and echo results are listed below.  Recent Labs     03/13/25  2355   *     Latest ECHO  Results for orders placed during the hospital encounter of 11/26/24    Echo    Interpretation Summary    Left Ventricle: The left ventricle is normal in size. Normal wall thickness. There is concentric hypertrophy. Normal wall motion. Septal motion is consistent with bundle branch block. There is moderately reduced systolic function. Ejection fraction is approximately 35%. Grade I diastolic dysfunction.    Right Ventricle: Normal right ventricular cavity size. Wall thickness is normal. Systolic function is normal.    IVC/SVC: Normal venous pressure at 3 mmHg.    Current Heart Failure Medications  metoprolol succinate (TOPROL-XL) 24 hr tablet 25 mg, Daily, Oral  furosemide tablet 40 mg, Daily, Oral  hydrALAZINE injection 10 mg, Every 6 hours PRN, Intravenous    Plan  - Monitor strict I&Os and daily weights.    - Place on telemetry  - Low sodium diet  - Place on fluid restriction of 1.5 L.   - Cardiology has not been consulted    Will repeat echo  Continue Lasix      C. difficile colitis  Patient is still reports diarrhea   Continue isolation   Vancomycin p.o.    Cont vanc po  Acute gout of left elbow  Good response to Solumedrol and Colchicine- cont    CKD  (chronic kidney disease) stage 4, GFR 15-29 ml/min  Creatine stable for now. BMP reviewed- noted Estimated Creatinine Clearance: 42.1 mL/min (A) (based on SCr of 2.1 mg/dL (H)). according to latest data. Based on current GFR, CKD stage is stage 4 - GFR 15-29.  Monitor UOP and serial BMP and adjust therapy as needed. Renally dose meds. Avoid nephrotoxic medications and procedures.  Deep vein thrombosis (DVT) of lower extremity  Continue Eliquis   Lower extremity ultrasound    Anemia, chronic disease  Stable   Continue to monitor  Chronic anticoagulation  Continue Eliquis    Type II diabetes mellitus with renal manifestations  Patient's FSGs are controlled on current medication regimen.  Last A1c reviewed-   Lab Results   Component Value Date    HGBA1C 5.7 (H) 12/17/2024     Most recent fingerstick glucose reviewed-   Recent Labs   Lab 03/14/25  1722 03/14/25  2140 03/15/25  0619 03/15/25  1147   POCTGLUCOSE 107 163* 220* 316*     Current correctional scale  Low  Maintain anti-hyperglycemic dose as follows-   Antihyperglycemics (From admission, onward)      Start     Stop Route Frequency Ordered    03/14/25 0900  insulin glargine U-100 (Lantus) pen 15 Units         -- SubQ Daily 03/14/25 0236    03/14/25 0336  insulin aspart U-100 pen 0-5 Units         -- SubQ Before meals & nightly PRN 03/14/25 0236          Hold Oral hypoglycemics while patient is in the hospital.  Chronic immunosuppression with Prograf, MMF and prednisone  Continue Prograf and CellCept   Hold mycophenolate    On Solumedrol for acute gout at present  VTE Risk Mitigation (From admission, onward)           Ordered     apixaban tablet 5 mg  2 times daily         03/14/25 0236                    Discharge Planning   GRETEL:      Code Status: Full Code   Medical Readiness for Discharge Date:   Discharge Plan A: Skilled Nursing Facility        Please place Justification for DME    Jaymie Medley MD  Department of Hospital Medicine   [facility]     Cigarettes

## 2025-03-15 NOTE — NURSING
Pt states has not urinated since 0530 this AM. Bladder scan volume 260 mL. Will continue to monitor.

## 2025-03-15 NOTE — PLAN OF CARE
Problem: Adult Inpatient Plan of Care  Goal: Plan of Care Review  Outcome: Progressing  Goal: Patient-Specific Goal (Individualized)  Outcome: Progressing  Goal: Absence of Hospital-Acquired Illness or Injury  Outcome: Progressing  Goal: Optimal Comfort and Wellbeing  Outcome: Progressing  Goal: Readiness for Transition of Care  Outcome: Progressing     Problem: Diabetes Comorbidity  Goal: Blood Glucose Level Within Targeted Range  Outcome: Progressing     Problem: Wound  Goal: Optimal Coping  Outcome: Progressing  Goal: Optimal Functional Ability  Outcome: Progressing  Goal: Absence of Infection Signs and Symptoms  Outcome: Progressing  Goal: Improved Oral Intake  Outcome: Progressing  Goal: Optimal Pain Control and Function  Outcome: Progressing  Goal: Skin Health and Integrity  Outcome: Progressing  Goal: Optimal Wound Healing  Outcome: Progressing     Problem: Fall Injury Risk  Goal: Absence of Fall and Fall-Related Injury  Outcome: Progressing     Problem: Skin Injury Risk Increased  Goal: Skin Health and Integrity  Outcome: Progressing     Problem: Infection  Goal: Absence of Infection Signs and Symptoms  Outcome: Progressing

## 2025-03-16 LAB
ACANTHOCYTES BLD QL SMEAR: PRESENT
ALLENS TEST: ABNORMAL
ANION GAP SERPL CALC-SCNC: 13 MMOL/L (ref 8–16)
BASOPHILS # BLD AUTO: ABNORMAL K/UL (ref 0–0.2)
BASOPHILS NFR BLD: 0 % (ref 0–1.9)
BUN SERPL-MCNC: 62 MG/DL (ref 8–23)
CALCIUM SERPL-MCNC: 8.2 MG/DL (ref 8.7–10.5)
CHLORIDE SERPL-SCNC: 104 MMOL/L (ref 95–110)
CO2 SERPL-SCNC: 15 MMOL/L (ref 23–29)
CREAT SERPL-MCNC: 2.6 MG/DL (ref 0.5–1.4)
DELSYS: ABNORMAL
DIFFERENTIAL METHOD BLD: ABNORMAL
EOSINOPHIL # BLD AUTO: ABNORMAL K/UL (ref 0–0.5)
EOSINOPHIL NFR BLD: 0 % (ref 0–8)
ERYTHROCYTE [DISTWIDTH] IN BLOOD BY AUTOMATED COUNT: 15.3 % (ref 11.5–14.5)
EST. GFR  (NO RACE VARIABLE): 26 ML/MIN/1.73 M^2
GLUCOSE SERPL-MCNC: 301 MG/DL (ref 70–110)
GLUCOSE SERPL-MCNC: 508 MG/DL (ref 70–110)
GLUCOSE SERPL-MCNC: 521 MG/DL (ref 70–110)
HCO3 UR-SCNC: 17 MMOL/L (ref 24–28)
HCT VFR BLD AUTO: 30.4 % (ref 40–54)
HCT VFR BLD CALC: 27 %PCV (ref 36–54)
HGB BLD-MCNC: 9 G/DL (ref 14–18)
HYPOCHROMIA BLD QL SMEAR: ABNORMAL
IMM GRANULOCYTES # BLD AUTO: ABNORMAL K/UL (ref 0–0.04)
IMM GRANULOCYTES NFR BLD AUTO: ABNORMAL % (ref 0–0.5)
LYMPHOCYTES # BLD AUTO: ABNORMAL K/UL (ref 1–4.8)
LYMPHOCYTES NFR BLD: 31 % (ref 18–48)
MCH RBC QN AUTO: 24.2 PG (ref 27–31)
MCHC RBC AUTO-ENTMCNC: 29.6 G/DL (ref 32–36)
MCV RBC AUTO: 82 FL (ref 82–98)
MODE: ABNORMAL
MONOCYTES # BLD AUTO: ABNORMAL K/UL (ref 0.3–1)
MONOCYTES NFR BLD: 10 % (ref 4–15)
NEUTROPHILS NFR BLD: 59 % (ref 38–73)
NRBC BLD-RTO: 1 /100 WBC
OVALOCYTES BLD QL SMEAR: ABNORMAL
PCO2 BLDA: 35.1 MMHG (ref 35–45)
PH SMN: 7.29 [PH] (ref 7.35–7.45)
PLATELET # BLD AUTO: 264 K/UL (ref 150–450)
PLATELET BLD QL SMEAR: ABNORMAL
PMV BLD AUTO: 10.7 FL (ref 9.2–12.9)
PO2 BLDA: 76 MMHG (ref 80–100)
POC BE: -9 MMOL/L
POC IONIZED CALCIUM: 1.23 MMOL/L (ref 1.06–1.42)
POC SATURATED O2: 94 % (ref 95–100)
POCT GLUCOSE: 299 MG/DL (ref 70–110)
POCT GLUCOSE: 301 MG/DL (ref 70–110)
POCT GLUCOSE: 313 MG/DL (ref 70–110)
POCT GLUCOSE: 335 MG/DL (ref 70–110)
POCT GLUCOSE: 443 MG/DL (ref 70–110)
POCT GLUCOSE: 455 MG/DL (ref 70–110)
POCT GLUCOSE: >500 MG/DL (ref 70–110)
POLYCHROMASIA BLD QL SMEAR: ABNORMAL
POTASSIUM BLD-SCNC: 4.7 MMOL/L (ref 3.5–5.1)
POTASSIUM SERPL-SCNC: 4.4 MMOL/L (ref 3.5–5.1)
RBC # BLD AUTO: 3.72 M/UL (ref 4.6–6.2)
SAMPLE: ABNORMAL
SCHISTOCYTES BLD QL SMEAR: PRESENT
SITE: ABNORMAL
SODIUM BLD-SCNC: 130 MMOL/L (ref 136–145)
SODIUM SERPL-SCNC: 132 MMOL/L (ref 136–145)
TARGETS BLD QL SMEAR: ABNORMAL
WBC # BLD AUTO: 4.28 K/UL (ref 3.9–12.7)

## 2025-03-16 PROCEDURE — 36415 COLL VENOUS BLD VENIPUNCTURE: CPT | Performed by: FAMILY MEDICINE

## 2025-03-16 PROCEDURE — 99900035 HC TECH TIME PER 15 MIN (STAT)

## 2025-03-16 PROCEDURE — 80048 BASIC METABOLIC PNL TOTAL CA: CPT | Performed by: FAMILY MEDICINE

## 2025-03-16 PROCEDURE — 25000003 PHARM REV CODE 250: Performed by: NURSE PRACTITIONER

## 2025-03-16 PROCEDURE — 25000003 PHARM REV CODE 250: Performed by: FAMILY MEDICINE

## 2025-03-16 PROCEDURE — 85027 COMPLETE CBC AUTOMATED: CPT | Performed by: FAMILY MEDICINE

## 2025-03-16 PROCEDURE — 82803 BLOOD GASES ANY COMBINATION: CPT

## 2025-03-16 PROCEDURE — 85007 BL SMEAR W/DIFF WBC COUNT: CPT | Performed by: FAMILY MEDICINE

## 2025-03-16 PROCEDURE — 94761 N-INVAS EAR/PLS OXIMETRY MLT: CPT | Mod: XB

## 2025-03-16 PROCEDURE — 63600175 PHARM REV CODE 636 W HCPCS: Performed by: NURSE PRACTITIONER

## 2025-03-16 PROCEDURE — 21400001 HC TELEMETRY ROOM

## 2025-03-16 PROCEDURE — 63600175 PHARM REV CODE 636 W HCPCS: Performed by: EMERGENCY MEDICINE

## 2025-03-16 PROCEDURE — 27000207 HC ISOLATION

## 2025-03-16 PROCEDURE — 36600 WITHDRAWAL OF ARTERIAL BLOOD: CPT

## 2025-03-16 PROCEDURE — 36415 COLL VENOUS BLD VENIPUNCTURE: CPT | Performed by: NURSE PRACTITIONER

## 2025-03-16 PROCEDURE — 51798 US URINE CAPACITY MEASURE: CPT

## 2025-03-16 PROCEDURE — 63600175 PHARM REV CODE 636 W HCPCS: Performed by: FAMILY MEDICINE

## 2025-03-16 PROCEDURE — 82947 ASSAY GLUCOSE BLOOD QUANT: CPT | Performed by: NURSE PRACTITIONER

## 2025-03-16 PROCEDURE — 25000003 PHARM REV CODE 250: Performed by: EMERGENCY MEDICINE

## 2025-03-16 RX ORDER — INSULIN ASPART 100 [IU]/ML
20 INJECTION, SOLUTION INTRAVENOUS; SUBCUTANEOUS ONCE
Status: COMPLETED | OUTPATIENT
Start: 2025-03-16 | End: 2025-03-16

## 2025-03-16 RX ORDER — INSULIN ASPART 100 [IU]/ML
0-10 INJECTION, SOLUTION INTRAVENOUS; SUBCUTANEOUS EVERY 4 HOURS PRN
Status: DISCONTINUED | OUTPATIENT
Start: 2025-03-16 | End: 2025-03-20 | Stop reason: HOSPADM

## 2025-03-16 RX ORDER — INSULIN ASPART 100 [IU]/ML
0-10 INJECTION, SOLUTION INTRAVENOUS; SUBCUTANEOUS
Status: DISCONTINUED | OUTPATIENT
Start: 2025-03-16 | End: 2025-03-16

## 2025-03-16 RX ORDER — SODIUM CHLORIDE 9 MG/ML
INJECTION, SOLUTION INTRAVENOUS CONTINUOUS
Status: DISCONTINUED | OUTPATIENT
Start: 2025-03-16 | End: 2025-03-19

## 2025-03-16 RX ORDER — INSULIN GLARGINE 100 [IU]/ML
15 INJECTION, SOLUTION SUBCUTANEOUS ONCE
Status: COMPLETED | OUTPATIENT
Start: 2025-03-16 | End: 2025-03-16

## 2025-03-16 RX ORDER — INSULIN GLARGINE 100 [IU]/ML
25 INJECTION, SOLUTION SUBCUTANEOUS 2 TIMES DAILY
Status: DISCONTINUED | OUTPATIENT
Start: 2025-03-16 | End: 2025-03-20 | Stop reason: HOSPADM

## 2025-03-16 RX ORDER — INSULIN GLARGINE 100 [IU]/ML
15 INJECTION, SOLUTION SUBCUTANEOUS DAILY
Status: DISCONTINUED | OUTPATIENT
Start: 2025-03-16 | End: 2025-03-16

## 2025-03-16 RX ORDER — PREDNISONE 10 MG/1
10 TABLET ORAL 2 TIMES DAILY
Status: DISCONTINUED | OUTPATIENT
Start: 2025-03-16 | End: 2025-03-20 | Stop reason: HOSPADM

## 2025-03-16 RX ORDER — CHOLESTYRAMINE 4 G/9G
1 POWDER, FOR SUSPENSION ORAL 2 TIMES DAILY
Status: DISCONTINUED | OUTPATIENT
Start: 2025-03-16 | End: 2025-03-20 | Stop reason: HOSPADM

## 2025-03-16 RX ORDER — PREDNISONE 20 MG/1
20 TABLET ORAL 2 TIMES DAILY
Status: DISCONTINUED | OUTPATIENT
Start: 2025-03-16 | End: 2025-03-16

## 2025-03-16 RX ADMIN — INSULIN GLARGINE 15 UNITS: 100 INJECTION, SOLUTION SUBCUTANEOUS at 03:03

## 2025-03-16 RX ADMIN — INSULIN ASPART 6 UNITS: 100 INJECTION, SOLUTION INTRAVENOUS; SUBCUTANEOUS at 06:03

## 2025-03-16 RX ADMIN — VANCOMYCIN HYDROCHLORIDE 125 MG: KIT at 06:03

## 2025-03-16 RX ADMIN — COLCHICINE 0.6 MG: 0.6 TABLET ORAL at 08:03

## 2025-03-16 RX ADMIN — ASPIRIN 81 MG: 81 TABLET, COATED ORAL at 08:03

## 2025-03-16 RX ADMIN — INSULIN ASPART 20 UNITS: 100 INJECTION, SOLUTION INTRAVENOUS; SUBCUTANEOUS at 01:03

## 2025-03-16 RX ADMIN — FAMOTIDINE 20 MG: 20 TABLET, FILM COATED ORAL at 08:03

## 2025-03-16 RX ADMIN — VANCOMYCIN HYDROCHLORIDE 125 MG: KIT at 01:03

## 2025-03-16 RX ADMIN — CHOLESTYRAMINE 4 G: 4 POWDER, FOR SUSPENSION ORAL at 01:03

## 2025-03-16 RX ADMIN — FUROSEMIDE 40 MG: 40 TABLET ORAL at 08:03

## 2025-03-16 RX ADMIN — INSULIN GLARGINE 25 UNITS: 100 INJECTION, SOLUTION SUBCUTANEOUS at 06:03

## 2025-03-16 RX ADMIN — INSULIN ASPART 8 UNITS: 100 INJECTION, SOLUTION INTRAVENOUS; SUBCUTANEOUS at 11:03

## 2025-03-16 RX ADMIN — PREDNISONE 10 MG: 10 TABLET ORAL at 09:03

## 2025-03-16 RX ADMIN — CHOLESTYRAMINE 4 G: 4 POWDER, FOR SUSPENSION ORAL at 09:03

## 2025-03-16 RX ADMIN — METOPROLOL SUCCINATE 25 MG: 25 TABLET, EXTENDED RELEASE ORAL at 08:03

## 2025-03-16 RX ADMIN — VANCOMYCIN HYDROCHLORIDE 125 MG: KIT at 11:03

## 2025-03-16 RX ADMIN — INSULIN GLARGINE 25 UNITS: 100 INJECTION, SOLUTION SUBCUTANEOUS at 08:03

## 2025-03-16 RX ADMIN — INSULIN GLARGINE 15 UNITS: 100 INJECTION, SOLUTION SUBCUTANEOUS at 01:03

## 2025-03-16 RX ADMIN — SODIUM CHLORIDE 500 ML: 9 INJECTION, SOLUTION INTRAVENOUS at 02:03

## 2025-03-16 RX ADMIN — CALCITRIOL CAPSULES 0.25 MCG 0.25 MCG: 0.25 CAPSULE ORAL at 08:03

## 2025-03-16 RX ADMIN — INSULIN ASPART 8 UNITS: 100 INJECTION, SOLUTION INTRAVENOUS; SUBCUTANEOUS at 04:03

## 2025-03-16 RX ADMIN — TACROLIMUS 4 MG: 1 CAPSULE ORAL at 08:03

## 2025-03-16 RX ADMIN — METHYLPREDNISOLONE SODIUM SUCCINATE 40 MG: 40 INJECTION, POWDER, FOR SOLUTION INTRAMUSCULAR; INTRAVENOUS at 06:03

## 2025-03-16 RX ADMIN — TACROLIMUS 3 MG: 1 CAPSULE ORAL at 06:03

## 2025-03-16 RX ADMIN — Medication 1 TABLET: at 08:03

## 2025-03-16 RX ADMIN — INSULIN GLARGINE 25 UNITS: 100 INJECTION, SOLUTION SUBCUTANEOUS at 09:03

## 2025-03-16 RX ADMIN — SODIUM CHLORIDE: 9 INJECTION, SOLUTION INTRAVENOUS at 01:03

## 2025-03-16 RX ADMIN — INSULIN ASPART 8 UNITS: 100 INJECTION, SOLUTION INTRAVENOUS; SUBCUTANEOUS at 09:03

## 2025-03-16 RX ADMIN — FERROUS SULFATE TAB 325 MG (65 MG ELEMENTAL FE) 1 EACH: 325 (65 FE) TAB at 08:03

## 2025-03-16 RX ADMIN — APIXABAN 5 MG: 2.5 TABLET, FILM COATED ORAL at 08:03

## 2025-03-16 RX ADMIN — CYANOCOBALAMIN 1000 MCG: 1000 INJECTION INTRAMUSCULAR; SUBCUTANEOUS at 08:03

## 2025-03-16 RX ADMIN — HUMAN INSULIN 10 UNITS: 100 INJECTION, SOLUTION SUBCUTANEOUS at 03:03

## 2025-03-16 RX ADMIN — PREDNISONE 20 MG: 20 TABLET ORAL at 09:03

## 2025-03-16 RX ADMIN — APIXABAN 5 MG: 2.5 TABLET, FILM COATED ORAL at 09:03

## 2025-03-16 NOTE — NURSING
Patient's blood sugar at 2146 was 462, covered with 3 units per PRN insulin sliding scale. Rechecked blood sugar at 0000 and sugar was greater than 500. Notified hospital medicine and ordered STAT blood glucose lab draw. Lab draw came back as greater than 500. Patient got 15 units of Lantus and 20 units of insulin Aspart at 0130. Rechecked sugar at 0215 and sugar still greater than 500. Hospital medicine notified. ABG and 500mL NS bolus ordered. ABG blood sugar resulted as 521. 10 units of IV insulin given at 0330 as well as additional 15units of Lantus. Rechecked blood sugar at 0400, blood sugar was greater than 443. Notified hospital medicine. Will recheck POCT blood sugar at 6 and give insulin Gargline early, per Dr. Cao order.      Patient has not voided since daytime nurse I&O cathed him at 1615 and removed 300mL. Patient bladder scan showed 93mL at 0010. Hospital medicine notified of decrease urine output.

## 2025-03-16 NOTE — NURSING
Blood sugar at 6 AM showed 299. Bladder scan resulted 189. Hospital medicine notified and repeat bladder scan in four hours.

## 2025-03-16 NOTE — ASSESSMENT & PLAN NOTE
Patient's FSGs are controlled on current medication regimen.  Last A1c reviewed-   Lab Results   Component Value Date    HGBA1C 5.7 (H) 12/17/2024     Most recent fingerstick glucose reviewed-   Recent Labs   Lab 03/16/25  0325 03/16/25  0400 03/16/25  0604 03/16/25  1158   POCTGLUCOSE >500* 443* 299* 335*     Current correctional scale  Low  Maintain anti-hyperglycemic dose as follows-   Antihyperglycemics (From admission, onward)      Start     Stop Route Frequency Ordered    03/16/25 0900  insulin glargine U-100 (Lantus) pen 25 Units         -- SubQ 2 times daily 03/16/25 0225    03/16/25 0411  insulin aspart U-100 pen 0-10 Units         -- SubQ Every 4 hours PRN 03/16/25 0312          Hold Oral hypoglycemics while patient is in the hospital.    BS high sec to steroids, which were lowered and lantus/SSI increased

## 2025-03-16 NOTE — ASSESSMENT & PLAN NOTE
Creatine stable for now. BMP reviewed- noted Estimated Creatinine Clearance: 33.8 mL/min (A) (based on SCr of 2.6 mg/dL (H)). according to latest data. Based on current GFR, CKD stage is stage 4 - GFR 15-29.  Monitor UOP and serial BMP and adjust therapy as needed. Renally dose meds. Avoid nephrotoxic medications and procedures.    Likely sec to lasix, Diarrhea- treat w IVF, hold Lasix

## 2025-03-16 NOTE — PLAN OF CARE
Discussed poc with pt, pt verbalized understanding    Purposeful rounding every 2hours    VS wnl  Cardiac monitoring in use, pt is SAM, tele monitor # [id number]  Blood glucose monitoring   Fall precautions in place, remains injury free  Pt able to void spontaneously today    IVFs  Accurate I&Os  Abx given as prescribed  Bed locked at lowest position  Call light within reach    Chart check complete  Will cont with POC    Problem: Adult Inpatient Plan of Care  Goal: Plan of Care Review  Outcome: Progressing  Goal: Patient-Specific Goal (Individualized)  Outcome: Progressing  Goal: Absence of Hospital-Acquired Illness or Injury  Outcome: Progressing  Goal: Optimal Comfort and Wellbeing  Outcome: Progressing  Goal: Readiness for Transition of Care  Outcome: Progressing     Problem: Diabetes Comorbidity  Goal: Blood Glucose Level Within Targeted Range  Outcome: Progressing     Problem: Wound  Goal: Optimal Coping  Outcome: Progressing  Goal: Optimal Functional Ability  Outcome: Progressing  Goal: Absence of Infection Signs and Symptoms  Outcome: Progressing  Goal: Improved Oral Intake  Outcome: Progressing  Goal: Optimal Pain Control and Function  Outcome: Progressing  Goal: Skin Health and Integrity  Outcome: Progressing  Goal: Optimal Wound Healing  Outcome: Progressing     Problem: Fall Injury Risk  Goal: Absence of Fall and Fall-Related Injury  Outcome: Progressing     Problem: Skin Injury Risk Increased  Goal: Skin Health and Integrity  Outcome: Progressing     Problem: Infection  Goal: Absence of Infection Signs and Symptoms  Outcome: Progressing

## 2025-03-16 NOTE — PLAN OF CARE
P.T. EVAL COMPLETE.  PT CURRENTLY REQUIRES TOTAL ASSIST FOR LATERAL ROLLING IN BED.  P.T. RECOMMENDS MODERATE INTENSITY THERAPY UPON DISCHARGE

## 2025-03-16 NOTE — PT/OT/SLP EVAL
"Physical Therapy Evaluation and Treatment    Patient Name:  Mitch Whittaker   MRN:  [id number]    Recommendations:     Discharge Recommendations: Moderate Intensity Therapy   Discharge Equipment Recommendations: none   Barriers to discharge: None    Assessment:     Mitch Whittaker is a 69 y.o. male admitted with a medical diagnosis of Acute on chronic systolic congestive heart failure.  He presents with the following impairments/functional limitations: weakness, impaired endurance, impaired functional mobility, impaired balance, pain, decreased safety awareness, decreased lower extremity function, decreased ROM, impaired coordination.    Rehab Prognosis: Fair and Poor; patient would benefit from acute skilled PT services to address these deficits and reach maximum level of function.    Recent Surgery: * No surgery found *   **PT WAS RECENTLY ADMITTED TO [facility], CAME FROM NURSING HOME WHERE HE HAD BEEN TOTAL CARE FOR 1 MONTH, THEN RETURNED HOME AND WAS ADMITTED BACK TO [facility] THE NEXT DAY, PROGRESS IN THERAPY POC IS QUESTIONABLE     Plan:     During this hospitalization, patient to be seen 3 x/week to address the identified rehab impairments via therapeutic activities, therapeutic exercises and progress toward the following goals:    Plan of Care Expires:  03/30/25    Subjective     Chief Complaint: "I CAN'T DO THIS"  Patient/Family Comments/goals: "I WANT TO TRY BUT I CAN'T DO THIS TODAY"    PT GIVEN EXTENSIVE EDUCATION ON IMPORTANCE OF CONSISTENT PARTICIPATION IN THERAPY POC IF GOAL IS TO SEE FUNCTIONAL GAINS    Pain/Comfort:  Pain Rating 1: 8/10  Location - Side 1: Left  Location - Orientation 1: generalized  Location 1: knee  Pain Addressed 1: Reposition    Patients cultural, spiritual, Scientologist conflicts given the current situation:      Living Environment:  PT HAS BEEN IN/OUT OF HOSPITAL SINCE JANUARY, HAS BEEN MOSTLY BED BOUND/TOTAL CARE SINCE THEN.  Equipment used at home: bedside commode, hospital bed, slide board, " "walker, rolling (OBTAINED FROM EPIC ENCOUNTER).  DME owned (not currently used): none.  Upon discharge, patient will have assistance from ?.    Objective:     Communicated with [facility] prior to session.  Patient found supine with telemetry, peripheral IV  upon PT entry to room.    General Precautions: Standard, fall, special contact  Orthopedic Precautions:N/A (C/O GOUT TO L KNEE)   Braces: N/A  Respiratory Status: Room air    Exams:  Cognitive Exam:  Patient is oriented to Person, Place, Time, and Situation  Sensation:    -       Intact  Skin Integrity/Edema:      -       Edema: Mild BLE  RLE ROM: WFL  RLE Strength: GROSSLY 3+/5  LLE ROM: LIMITED DUE TO PAINFUL KNEE  LLE Strength: UNABLE TO ASSESS    Functional Mobility:  Bed Mobility:     Rolling Left:  dependence  Rolling Right: dependence  Scooting: dependence  PT REFUSED ALL EOB ACTIVITY DESPITE ENCOURAGEMENT  PT EDUCATED IN SUPINE BLE THEREX TO PERFORM THROUGHOUT THE DAY, PT WITH MINIMAL PARTICIPATION DUE TO C/O CONSTANT BM'S AND PAIN: HIP FLEX/EXT, HIP ABD/ADD, AP'S, QUAD SETS  PT EDUCATED ON IMPORTANCE OF CHANGING POSITIONS OFTEN FOR PRESSURE RELIEF    AM-PAC 6 CLICK MOBILITY  Total Score:6     Treatment & Education:  PT EDUCATION:  - ROLE OF P.T. AND POC IN ACUTE CARE HOSPITAL SETTING  - TO CONTINUE THERAPUETIC EXERCISES THROUGHOUT THE DAY TO INCREASE ACTIVITY TOLERANCE AND DECREASE RISK FOR PNEUMONIA AND BLOOD CLOTS: HIP FLEX/EXT, HIP ABD/ADD, QUAD SET, HEEL SLIDE, AP  - RISK FOR FALLS DUE TO GENERALIZED WEAKNESS, EDUCATED ON "CALL DON'T FALL", ENCOURAGED TO CALL FOR ASSISTANCE WITH ALL NEEDS     Patient left HOB elevated with all lines intact, call button in reach, and bed alarm on.    GOALS:   Multidisciplinary Problems       Physical Therapy Goals          Problem: Physical Therapy    Goal Priority Disciplines Outcome Interventions   Physical Therapy Goal     PT, PT/OT     Description: LTG'S TO BE MET IN 14 DAYS (3-30-25)  PT WILL REQUIRE MODA FOR BED " MOBILITY  PT WILL REQUIRE MODA FOR SIT<>STAND TF WITH RW  PT WILL REQUIRE MODA FOR BED<>CHAIR TF WITH RW  PT WILL INC AMPAC SCORE BY 2 POINTS TO PROGRESS GROSS FUNC MOBILITY                         DME Justifications:  No DME recommended requiring DME justifications    History:     Past Medical History:   Diagnosis Date    Acquired renal cyst of left kidney     Anemia associated with chronic renal failure     CAD (coronary artery disease)     nonobstructive c 9/14    CHF (congestive heart failure)     Chronic immunosuppression with Prograf and MMF 06/18/2015    Chronic venous insufficiency of lower extremity     CKD (chronic kidney disease) stage 3, GFR 30-59 ml/min     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 RNA NEGATIVE 5/2015, 2021, 2022    History of colon polyps     HPTH (hyperparathyroidism)     Hyperlipidemia     Hypertension associated with stage 3 chronic kidney disease due to type 2 diabetes mellitus     LBBB (left bundle branch block) 12/20/2021    Morbid obesity with BMI of 45.0-49.9, adult     Nephrolithiasis 6/7/2013    PCO (posterior capsular opacification), left 03/04/2019    Proteinuria     resolved s/p transplant    S/P kidney transplant     Sleep apnea     Type 2 diabetes, uncontrolled, with retinopathy     Type II diabetes mellitus with renal manifestations        Past Surgical History:   Procedure Laterality Date    CARDIAC CATHETERIZATION  01/01/2008    normal coronary    CARPAL TUNNEL RELEASE Right 12/01/2023    Procedure: RELEASE, CARPAL TUNNEL;  Surgeon: Noel Almonte MD;  Location: [facility];  Service: Orthopedics;  Laterality: Right;     CARPAL TUNNEL RELEASE Left 7/18/2024    Procedure: RELEASE, CARPAL TUNNEL;  Surgeon: Noel Almonte MD;  Location: [facility] OR;  Service: Orthopedics;  Laterality: Left;    COLONOSCOPY N/A 04/05/2018    Procedure: COLONOSCOPY;  Surgeon: Chava Ronquillo MD;  Location: [facility] ENDO;  Service: Endoscopy;  Laterality: N/A;    COLONOSCOPY N/A 05/02/2022    Procedure: COLONOSCOPY;  Surgeon: Alix Puente MD;  Location: [facility] ENDO;  Service: Endoscopy;  Laterality: N/A;    COLONOSCOPY N/A 06/07/2023    Procedure: COLONOSCOPY - rule out CMV  Cardiac clearance/Eliquis hold approval received on 05/21/23 per Dr. Meade, cardiology.  Note in encounters.  LB;  Surgeon: Daniella Shah MD;  Location: [facility];  Service: Endoscopy;  Laterality: N/A;    ESOPHAGOGASTRODUODENOSCOPY N/A 03/26/2024    Procedure: EGD (ESOPHAGOGASTRODUODENOSCOPY) 3/1-pt cleared, ok to hold eliquis for 3 days;  Surgeon: Daniella Shah MD;  Location: [facility];  Service: Endoscopy;  Laterality: N/A;    KIDNEY TRANSPLANT  2015    RETINAL LASER PROCEDURE         Time Tracking:     PT Received On: 03/16/25  PT Start Time: 0750     PT Stop Time: 0815  PT Total Time (min): 25 min     Billable Minutes: Evaluation 15 and Therapeutic Activity 10    03/16/2025

## 2025-03-16 NOTE — ASSESSMENT & PLAN NOTE
Patient is still reports diarrhea   Continue isolation   Vancomycin p.o.    Cont vanc po    Now on Day 7/10 of oral vanc  Add Questran to control diarrhea

## 2025-03-16 NOTE — PROGRESS NOTES
[facility] Medicine  Progress Note    Patient Name: Mitch Whittaker  MRN: [id number]  Patient Class: IP- Inpatient   Admission Date: 3/13/2025  Length of Stay: 2 days  Attending Physician: Jaymie Medley MD  Primary Care Provider: Valery Caal MD        Subjective     Principal Problem:Acute on chronic systolic congestive heart failure        HPI:  Patient is a 69-year-old  male with PMH of CHF, kidney transplant, CAD, DM, DVT who presents to the ED for complaints of pain and swelling to bilateral arms.  Of note patient was recently discharged on 03/12/2025 after being treated for ANGELITO and UTI.  Patient developed gross hematuria during stay and Eliquis was held.  Hematuria resolved and Eliquis was resumed prior to DC.  Patient tested positive for C diff and oral vancomycin was initiated.  Plan initially was for discharge back to [facility] however patient was unable to provide financials and was discharged home with home health.  Patient states since being discharged he has developed bilateral edema to his arms.  Tenderness also reported.  Denies any fever.  Patient still complains of diarrhea.    In the ED, H&H 9/29.6, BUN/CR 31/1.8, , D-dimer 3.5.  Chest x-ray showed mild vascular congestion.  Lasix 40 mg initiated.    Overview/Hospital Course:  69-year-old AAM with Hx of CHF, kidney transplant, CAD, DM, DVT who presented to the ED for pain and swelling to bilateral arms. Pt was just discharged on 03/12/2025 after being treated for ANGELITO and UTI and C diff colitis with oral Vanc. Pt developed gross hematuria during stay and Eliquis was held. Hematuria resolved and Eliquis was resumed prior to DC.  Patient tested positive for C diff and oral vancomycin was initiated.  Plan initially was to discharge back to [facility] however patient was unable to provide financials and was discharged home with home health.  Patient states since being discharged he has developed  bilateral edema to his arms.  Tenderness also reported.  Denies any fever.  Patient still complains of diarrhea.     In the ED, H&H 9/29.6, BUN/CR 31/1.8, , D-dimer 3.5.  Chest x-ray showed mild vascular congestion.  Lasix 40 mg initiated.      Also suspect mild gout- will give steroids and oral colchicine. Cont oral vanc for C diff.    3/15- looks and feels better, good response to steroids, BUE pain and swelling improving, able to lift his arms now. Cont present care. Will start PT/OT and start inj b12 plus triple vitamins, d/c home soon.     3/16- looks and feels better, more alert dn responsive, eating with both his hands easily, swelling, pain much better. Bun/Cr still rising, likely from diarrhea from C Diff- will add Questran and start IVF. Also BS was very high last evening sec to Prednisone for his gout- BS better now- will lower Prednisone and cont Lantus plus SSI.    Interval History: looks and feels better, more alert dn responsive, eating with both his hands easily, swelling, pain much better. Bun/Cr still rising, likely from diarrhea from C Diff- will add Questran and start IVF. Also BS was very high last evening sec to Prednisone for his gout- BS better now- will lower Prednisone and cont Lantus plus SSI.    Review of Systems   Constitutional:  Positive for activity change and appetite change. Negative for fatigue and fever.   HENT:  Negative for sinus pressure.    Eyes:  Negative for visual disturbance.   Respiratory:  Negative for shortness of breath.    Cardiovascular:  Positive for leg swelling. Negative for chest pain.   Gastrointestinal:  Negative for nausea and vomiting.   Genitourinary:  Negative for difficulty urinating.   Musculoskeletal:  Positive for arthralgias and joint swelling. Negative for back pain.   Skin:  Negative for rash.   Neurological:  Positive for weakness. Negative for headaches.   Psychiatric/Behavioral:  Negative for confusion.      Objective:     Vital Signs (Most  Recent):  Temp: 97.4 °F (36.3 °C) (03/16/25 0751)  Pulse: 80 (03/16/25 1101)  Resp: 18 (03/16/25 0751)  BP: (!) 122/58 (03/16/25 0839)  SpO2: 98 % (03/16/25 0751) Vital Signs (24h Range):  Temp:  [97.4 °F (36.3 °C)-97.6 °F (36.4 °C)] 97.4 °F (36.3 °C)  Pulse:  [76-92] 80  Resp:  [18-20] 18  SpO2:  [94 %-99 %] 98 %  BP: (111-137)/(55-68) 122/58     Weight: 112.5 kg (248 lb 0.3 oz)  Body mass index is 33.64 kg/m².    Intake/Output Summary (Last 24 hours) at 3/16/2025 1249  Last data filed at 3/16/2025 0743  Gross per 24 hour   Intake 473.07 ml   Output 300 ml   Net 173.07 ml         Physical Exam  Vitals and nursing note reviewed.   Constitutional:       General: He is awake. He is not in acute distress.     Appearance: He is well-developed. He is obese. He is ill-appearing. He is not diaphoretic.   HENT:      Head: Normocephalic and atraumatic.   Eyes:      Pupils: Pupils are equal, round, and reactive to light.   Cardiovascular:      Rate and Rhythm: Normal rate and regular rhythm.      Heart sounds: Normal heart sounds. No murmur heard.     No friction rub. No gallop.   Pulmonary:      Effort: Pulmonary effort is normal. No respiratory distress.      Breath sounds: Normal breath sounds. No stridor. No wheezing or rales.   Abdominal:      General: Bowel sounds are normal. There is no distension.      Palpations: Abdomen is soft. There is no mass.      Tenderness: There is no abdominal tenderness. There is no guarding.   Musculoskeletal:      Right lower leg: Edema present.      Left lower leg: Edema present.      Comments: Bilateral upper extremity swelling and tenderness   Skin:     General: Skin is warm.      Capillary Refill: Capillary refill takes 2 to 3 seconds.      Findings: No erythema.   Neurological:      Mental Status: He is alert and oriented to person, place, and time.   Psychiatric:         Behavior: Behavior is cooperative.               Significant Labs: All pertinent labs within the past 24 hours  have been reviewed.  BMP:   Recent Labs   Lab 03/16/25  0700   *   *   K 4.4      CO2 15*   BUN 62*   CREATININE 2.6*   CALCIUM 8.2*     CBC:   Recent Labs   Lab 03/15/25  0556 03/16/25  0300 03/16/25  0700   WBC 3.20*  --  4.28   HGB 9.7*  --  9.0*   HCT 33.0* 27* 30.4*     --  264       Significant Imaging: I have reviewed all pertinent imaging results/findings within the past 24 hours.      Assessment & Plan  Acute on chronic systolic congestive heart failure  Patient has Systolic (HFrEF) heart failure that is Acute on chronic. On presentation their CHF was decompensated. Evidence of decompensated CHF on presentation includes: edema and weight gain. The etiology of their decompensation is likely increased fluid intake. Most recent BNP and echo results are listed below.  Recent Labs     03/13/25  2355   *     Latest ECHO  Results for orders placed during the hospital encounter of 11/26/24    Echo    Interpretation Summary    Left Ventricle: The left ventricle is normal in size. Normal wall thickness. There is concentric hypertrophy. Normal wall motion. Septal motion is consistent with bundle branch block. There is moderately reduced systolic function. Ejection fraction is approximately 35%. Grade I diastolic dysfunction.    Right Ventricle: Normal right ventricular cavity size. Wall thickness is normal. Systolic function is normal.    IVC/SVC: Normal venous pressure at 3 mmHg.    Current Heart Failure Medications  metoprolol succinate (TOPROL-XL) 24 hr tablet 25 mg, Daily, Oral  hydrALAZINE injection 10 mg, Every 6 hours PRN, Intravenous    Plan  - Monitor strict I&Os and daily weights.    - Place on telemetry  - Low sodium diet  - Place on fluid restriction of 1.5 L.   - Cardiology has not been consulted    Will repeat echo  Continue Lasix    Bun/Cr rising- hold lasix  Give gentle hydration      C. difficile colitis  Patient is still reports diarrhea   Continue isolation    Vancomycin p.o.    Cont vanc po    Now on Day 7/10 of oral vanc  Add Questran to control diarrhea  Acute gout of left elbow  Good response to Solumedrol and Colchicine- cont    Much better  Check ESR, CRP in am    ANGELITO on CKD 4  Creatine stable for now. BMP reviewed- noted Estimated Creatinine Clearance: 33.8 mL/min (A) (based on SCr of 2.6 mg/dL (H)). according to latest data. Based on current GFR, CKD stage is stage 4 - GFR 15-29.  Monitor UOP and serial BMP and adjust therapy as needed. Renally dose meds. Avoid nephrotoxic medications and procedures.    Likely sec to lasix, Diarrhea- treat w IVF, hold Lasix  Deep vein thrombosis (DVT) of lower extremity  Continue Eliquis   Lower extremity ultrasound    Anemia, chronic disease  Stable   Continue to monitor  Chronic anticoagulation  Continue Eliquis    Type II diabetes mellitus with renal manifestations  Patient's FSGs are controlled on current medication regimen.  Last A1c reviewed-   Lab Results   Component Value Date    HGBA1C 5.7 (H) 12/17/2024     Most recent fingerstick glucose reviewed-   Recent Labs   Lab 03/16/25  0325 03/16/25  0400 03/16/25  0604 03/16/25  1158   POCTGLUCOSE >500* 443* 299* 335*     Current correctional scale  Low  Maintain anti-hyperglycemic dose as follows-   Antihyperglycemics (From admission, onward)      Start     Stop Route Frequency Ordered    03/16/25 0900  insulin glargine U-100 (Lantus) pen 25 Units         -- SubQ 2 times daily 03/16/25 0225    03/16/25 0411  insulin aspart U-100 pen 0-10 Units         -- SubQ Every 4 hours PRN 03/16/25 0312          Hold Oral hypoglycemics while patient is in the hospital.    BS high sec to steroids, which were lowered and lantus/SSI increased  Chronic immunosuppression with Prograf, MMF and prednisone  Continue Prograf and CellCept   Hold mycophenolate    On Solumedrol for acute gout at present  VTE Risk Mitigation (From admission, onward)           Ordered     apixaban tablet 5 mg  2 times  daily         03/14/25 0236                    Discharge Planning   GRETEL:      Code Status: Full Code   Medical Readiness for Discharge Date:   Discharge Plan A: Skilled Nursing Facility        Please place Justification for DME    Jaymie Medley MD  Department of Hospital Medicine   [facility]

## 2025-03-16 NOTE — ASSESSMENT & PLAN NOTE
Patient has Systolic (HFrEF) heart failure that is Acute on chronic. On presentation their CHF was decompensated. Evidence of decompensated CHF on presentation includes: edema and weight gain. The etiology of their decompensation is likely increased fluid intake. Most recent BNP and echo results are listed below.  Recent Labs     03/13/25  2355   *     Latest ECHO  Results for orders placed during the hospital encounter of 11/26/24    Echo    Interpretation Summary    Left Ventricle: The left ventricle is normal in size. Normal wall thickness. There is concentric hypertrophy. Normal wall motion. Septal motion is consistent with bundle branch block. There is moderately reduced systolic function. Ejection fraction is approximately 35%. Grade I diastolic dysfunction.    Right Ventricle: Normal right ventricular cavity size. Wall thickness is normal. Systolic function is normal.    IVC/SVC: Normal venous pressure at 3 mmHg.    Current Heart Failure Medications  metoprolol succinate (TOPROL-XL) 24 hr tablet 25 mg, Daily, Oral  hydrALAZINE injection 10 mg, Every 6 hours PRN, Intravenous    Plan  - Monitor strict I&Os and daily weights.    - Place on telemetry  - Low sodium diet  - Place on fluid restriction of 1.5 L.   - Cardiology has not been consulted    Will repeat echo  Continue Lasix    Bun/Cr rising- hold lasix  Give gentle hydration

## 2025-03-16 NOTE — SUBJECTIVE & OBJECTIVE
Interval History: looks and feels better, more alert dn responsive, eating with both his hands easily, swelling, pain much better. Bun/Cr still rising, likely from diarrhea from C Diff- will add Questran and start IVF. Also BS was very high last evening sec to Prednisone for his gout- BS better now- will lower Prednisone and cont Lantus plus SSI.    Review of Systems   Constitutional:  Positive for activity change and appetite change. Negative for fatigue and fever.   HENT:  Negative for sinus pressure.    Eyes:  Negative for visual disturbance.   Respiratory:  Negative for shortness of breath.    Cardiovascular:  Positive for leg swelling. Negative for chest pain.   Gastrointestinal:  Negative for nausea and vomiting.   Genitourinary:  Negative for difficulty urinating.   Musculoskeletal:  Positive for arthralgias and joint swelling. Negative for back pain.   Skin:  Negative for rash.   Neurological:  Positive for weakness. Negative for headaches.   Psychiatric/Behavioral:  Negative for confusion.      Objective:     Vital Signs (Most Recent):  Temp: 97.4 °F (36.3 °C) (03/16/25 0751)  Pulse: 80 (03/16/25 1101)  Resp: 18 (03/16/25 0751)  BP: (!) 122/58 (03/16/25 0839)  SpO2: 98 % (03/16/25 0751) Vital Signs (24h Range):  Temp:  [97.4 °F (36.3 °C)-97.6 °F (36.4 °C)] 97.4 °F (36.3 °C)  Pulse:  [76-92] 80  Resp:  [18-20] 18  SpO2:  [94 %-99 %] 98 %  BP: (111-137)/(55-68) 122/58     Weight: 112.5 kg (248 lb 0.3 oz)  Body mass index is 33.64 kg/m².    Intake/Output Summary (Last 24 hours) at 3/16/2025 1249  Last data filed at 3/16/2025 0743  Gross per 24 hour   Intake 473.07 ml   Output 300 ml   Net 173.07 ml         Physical Exam  Vitals and nursing note reviewed.   Constitutional:       General: He is awake. He is not in acute distress.     Appearance: He is well-developed. He is obese. He is ill-appearing. He is not diaphoretic.   HENT:      Head: Normocephalic and atraumatic.   Eyes:      Pupils: Pupils are equal,  round, and reactive to light.   Cardiovascular:      Rate and Rhythm: Normal rate and regular rhythm.      Heart sounds: Normal heart sounds. No murmur heard.     No friction rub. No gallop.   Pulmonary:      Effort: Pulmonary effort is normal. No respiratory distress.      Breath sounds: Normal breath sounds. No stridor. No wheezing or rales.   Abdominal:      General: Bowel sounds are normal. There is no distension.      Palpations: Abdomen is soft. There is no mass.      Tenderness: There is no abdominal tenderness. There is no guarding.   Musculoskeletal:      Right lower leg: Edema present.      Left lower leg: Edema present.      Comments: Bilateral upper extremity swelling and tenderness   Skin:     General: Skin is warm.      Capillary Refill: Capillary refill takes 2 to 3 seconds.      Findings: No erythema.   Neurological:      Mental Status: He is alert and oriented to person, place, and time.   Psychiatric:         Behavior: Behavior is cooperative.               Significant Labs: All pertinent labs within the past 24 hours have been reviewed.  BMP:   Recent Labs   Lab 03/16/25  0700   *   *   K 4.4      CO2 15*   BUN 62*   CREATININE 2.6*   CALCIUM 8.2*     CBC:   Recent Labs   Lab 03/15/25  0556 03/16/25  0300 03/16/25  0700   WBC 3.20*  --  4.28   HGB 9.7*  --  9.0*   HCT 33.0* 27* 30.4*     --  264       Significant Imaging: I have reviewed all pertinent imaging results/findings within the past 24 hours.

## 2025-03-17 LAB
ACANTHOCYTES BLD QL SMEAR: PRESENT
ANION GAP SERPL CALC-SCNC: 10 MMOL/L (ref 8–16)
BASOPHILS # BLD AUTO: ABNORMAL K/UL (ref 0–0.2)
BASOPHILS NFR BLD: 0 % (ref 0–1.9)
BUN SERPL-MCNC: 75 MG/DL (ref 8–23)
CALCIUM SERPL-MCNC: 7.9 MG/DL (ref 8.7–10.5)
CHLORIDE SERPL-SCNC: 106 MMOL/L (ref 95–110)
CO2 SERPL-SCNC: 16 MMOL/L (ref 23–29)
CREAT SERPL-MCNC: 2.9 MG/DL (ref 0.5–1.4)
CRP SERPL-MCNC: 93.1 MG/L (ref 0–8.2)
DACRYOCYTES BLD QL SMEAR: ABNORMAL
DIFFERENTIAL METHOD BLD: ABNORMAL
EOSINOPHIL # BLD AUTO: ABNORMAL K/UL (ref 0–0.5)
EOSINOPHIL NFR BLD: 0 % (ref 0–8)
ERYTHROCYTE [DISTWIDTH] IN BLOOD BY AUTOMATED COUNT: 15 % (ref 11.5–14.5)
ERYTHROCYTE [SEDIMENTATION RATE] IN BLOOD BY WESTERGREN METHOD: >120 MM/HR (ref 0–23)
EST. GFR  (NO RACE VARIABLE): 22 ML/MIN/1.73 M^2
GLUCOSE SERPL-MCNC: 289 MG/DL (ref 70–110)
HCT VFR BLD AUTO: 29.1 % (ref 40–54)
HCV RNA SERPL QL NAA+PROBE: NOT DETECTED
HCV RNA SPEC NAA+PROBE-ACNC: NOT DETECTED IU/ML
HGB BLD-MCNC: 8.7 G/DL (ref 14–18)
IMM GRANULOCYTES # BLD AUTO: ABNORMAL K/UL (ref 0–0.04)
IMM GRANULOCYTES NFR BLD AUTO: ABNORMAL % (ref 0–0.5)
LYMPHOCYTES # BLD AUTO: ABNORMAL K/UL (ref 1–4.8)
LYMPHOCYTES NFR BLD: 19 % (ref 18–48)
MCH RBC QN AUTO: 24.2 PG (ref 27–31)
MCHC RBC AUTO-ENTMCNC: 29.9 G/DL (ref 32–36)
MCV RBC AUTO: 81 FL (ref 82–98)
MONOCYTES # BLD AUTO: ABNORMAL K/UL (ref 0.3–1)
MONOCYTES NFR BLD: 5 % (ref 4–15)
NEUTROPHILS NFR BLD: 76 % (ref 38–73)
NRBC BLD-RTO: 1 /100 WBC
PLATELET # BLD AUTO: 239 K/UL (ref 150–450)
PLATELET BLD QL SMEAR: ABNORMAL
PMV BLD AUTO: 10.4 FL (ref 9.2–12.9)
POCT GLUCOSE: 254 MG/DL (ref 70–110)
POCT GLUCOSE: 261 MG/DL (ref 70–110)
POCT GLUCOSE: 293 MG/DL (ref 70–110)
POCT GLUCOSE: 313 MG/DL (ref 70–110)
POIKILOCYTOSIS BLD QL SMEAR: SLIGHT
POTASSIUM SERPL-SCNC: 4.3 MMOL/L (ref 3.5–5.1)
RBC # BLD AUTO: 3.6 M/UL (ref 4.6–6.2)
SCHISTOCYTES BLD QL SMEAR: PRESENT
SODIUM SERPL-SCNC: 132 MMOL/L (ref 136–145)
TARGETS BLD QL SMEAR: ABNORMAL
WBC # BLD AUTO: 3.16 K/UL (ref 3.9–12.7)

## 2025-03-17 PROCEDURE — 97530 THERAPEUTIC ACTIVITIES: CPT

## 2025-03-17 PROCEDURE — 25000003 PHARM REV CODE 250: Performed by: EMERGENCY MEDICINE

## 2025-03-17 PROCEDURE — 36415 COLL VENOUS BLD VENIPUNCTURE: CPT | Performed by: EMERGENCY MEDICINE

## 2025-03-17 PROCEDURE — 63600175 PHARM REV CODE 636 W HCPCS: Performed by: EMERGENCY MEDICINE

## 2025-03-17 PROCEDURE — 27000207 HC ISOLATION

## 2025-03-17 PROCEDURE — 97166 OT EVAL MOD COMPLEX 45 MIN: CPT

## 2025-03-17 PROCEDURE — 86140 C-REACTIVE PROTEIN: CPT | Performed by: EMERGENCY MEDICINE

## 2025-03-17 PROCEDURE — 85007 BL SMEAR W/DIFF WBC COUNT: CPT | Performed by: EMERGENCY MEDICINE

## 2025-03-17 PROCEDURE — 21400001 HC TELEMETRY ROOM

## 2025-03-17 PROCEDURE — 85651 RBC SED RATE NONAUTOMATED: CPT | Performed by: EMERGENCY MEDICINE

## 2025-03-17 PROCEDURE — 97535 SELF CARE MNGMENT TRAINING: CPT

## 2025-03-17 PROCEDURE — 63600175 PHARM REV CODE 636 W HCPCS: Performed by: FAMILY MEDICINE

## 2025-03-17 PROCEDURE — 80048 BASIC METABOLIC PNL TOTAL CA: CPT | Performed by: EMERGENCY MEDICINE

## 2025-03-17 PROCEDURE — 25000003 PHARM REV CODE 250: Performed by: FAMILY MEDICINE

## 2025-03-17 PROCEDURE — 85027 COMPLETE CBC AUTOMATED: CPT | Performed by: EMERGENCY MEDICINE

## 2025-03-17 PROCEDURE — 63600175 PHARM REV CODE 636 W HCPCS: Mod: JZ,EC,TB | Performed by: INTERNAL MEDICINE

## 2025-03-17 RX ORDER — TACROLIMUS 1 MG/1
2 CAPSULE ORAL EVERY MORNING
Status: DISCONTINUED | OUTPATIENT
Start: 2025-03-18 | End: 2025-03-19

## 2025-03-17 RX ADMIN — PREDNISONE 10 MG: 10 TABLET ORAL at 08:03

## 2025-03-17 RX ADMIN — VANCOMYCIN HYDROCHLORIDE 125 MG: KIT at 05:03

## 2025-03-17 RX ADMIN — VANCOMYCIN HYDROCHLORIDE 125 MG: KIT at 12:03

## 2025-03-17 RX ADMIN — TACROLIMUS 4 MG: 1 CAPSULE ORAL at 08:03

## 2025-03-17 RX ADMIN — ASPIRIN 81 MG: 81 TABLET, COATED ORAL at 08:03

## 2025-03-17 RX ADMIN — Medication 1 TABLET: at 08:03

## 2025-03-17 RX ADMIN — APIXABAN 5 MG: 2.5 TABLET, FILM COATED ORAL at 08:03

## 2025-03-17 RX ADMIN — VANCOMYCIN HYDROCHLORIDE 125 MG: KIT at 06:03

## 2025-03-17 RX ADMIN — CHOLESTYRAMINE 4 G: 4 POWDER, FOR SUSPENSION ORAL at 08:03

## 2025-03-17 RX ADMIN — COLCHICINE 0.6 MG: 0.6 TABLET ORAL at 08:03

## 2025-03-17 RX ADMIN — VANCOMYCIN HYDROCHLORIDE 125 MG: KIT at 11:03

## 2025-03-17 RX ADMIN — INSULIN GLARGINE 25 UNITS: 100 INJECTION, SOLUTION SUBCUTANEOUS at 08:03

## 2025-03-17 RX ADMIN — INSULIN ASPART 8 UNITS: 100 INJECTION, SOLUTION INTRAVENOUS; SUBCUTANEOUS at 12:03

## 2025-03-17 RX ADMIN — SODIUM CHLORIDE: 9 INJECTION, SOLUTION INTRAVENOUS at 03:03

## 2025-03-17 RX ADMIN — FAMOTIDINE 20 MG: 20 TABLET, FILM COATED ORAL at 08:03

## 2025-03-17 RX ADMIN — TACROLIMUS 1.5 MG: 0.5 CAPSULE ORAL at 05:03

## 2025-03-17 RX ADMIN — METOPROLOL SUCCINATE 25 MG: 25 TABLET, EXTENDED RELEASE ORAL at 08:03

## 2025-03-17 RX ADMIN — CALCITRIOL CAPSULES 0.25 MCG 0.25 MCG: 0.25 CAPSULE ORAL at 08:03

## 2025-03-17 RX ADMIN — INSULIN ASPART 6 UNITS: 100 INJECTION, SOLUTION INTRAVENOUS; SUBCUTANEOUS at 06:03

## 2025-03-17 RX ADMIN — EPOETIN ALFA-EPBX 10000 UNITS: 10000 INJECTION, SOLUTION INTRAVENOUS; SUBCUTANEOUS at 02:03

## 2025-03-17 RX ADMIN — INSULIN ASPART 6 UNITS: 100 INJECTION, SOLUTION INTRAVENOUS; SUBCUTANEOUS at 05:03

## 2025-03-17 NOTE — PROGRESS NOTES
[facility] Medicine  Progress Note    Patient Name: Mitch Whittaker  MRN: [id number]  Patient Class: IP- Inpatient   Admission Date: 3/13/2025  Length of Stay: 3 days  Attending Physician: Jaymie Medley MD  Primary Care Provider: Valery Caal MD        Subjective     Principal Problem:Acute on chronic systolic congestive heart failure        HPI:  Patient is a 69-year-old  male with PMH of CHF, kidney transplant, CAD, DM, DVT who presents to the ED for complaints of pain and swelling to bilateral arms.  Of note patient was recently discharged on 03/12/2025 after being treated for ANGELITO and UTI.  Patient developed gross hematuria during stay and Eliquis was held.  Hematuria resolved and Eliquis was resumed prior to DC.  Patient tested positive for C diff and oral vancomycin was initiated.  Plan initially was for discharge back to [facility] however patient was unable to provide financials and was discharged home with home health.  Patient states since being discharged he has developed bilateral edema to his arms.  Tenderness also reported.  Denies any fever.  Patient still complains of diarrhea.    In the ED, H&H 9/29.6, BUN/CR 31/1.8, , D-dimer 3.5.  Chest x-ray showed mild vascular congestion.  Lasix 40 mg initiated.    Overview/Hospital Course:  69-year-old AAM with Hx of CHF, kidney transplant, CAD, DM, DVT who presented to the ED for pain and swelling to bilateral arms. Pt was just discharged on 03/12/2025 after being treated for ANGELITO and UTI and C diff colitis with oral Vanc. Pt developed gross hematuria during stay and Eliquis was held. Hematuria resolved and Eliquis was resumed prior to DC.  Patient tested positive for C diff and oral vancomycin was initiated.  Plan initially was to discharge back to [facility] however patient was unable to provide financials and was discharged home with home health.  Patient states since being discharged he has developed  bilateral edema to his arms.  Tenderness also reported.  Denies any fever.  Patient still complains of diarrhea.     In the ED, H&H 9/29.6, BUN/CR 31/1.8, , D-dimer 3.5.  Chest x-ray showed mild vascular congestion.  Lasix 40 mg initiated.      Also suspect mild gout- will give steroids and oral colchicine. Cont oral vanc for C diff.    3/15- looks and feels better, good response to steroids, BUE pain and swelling improving, able to lift his arms now. Cont present care. Will start PT/OT and start inj b12 plus triple vitamins, d/c home soon.     3/16- looks and feels better, more alert dn responsive, eating with both his hands easily, swelling, pain much better. Bun/Cr still rising, likely from diarrhea from C Diff- will add Questran and start IVF. Also BS was very high last evening sec to Prednisone for his gout- BS better now- will lower Prednisone and cont Lantus plus SSI.    3/17- Appreciate Dr. Mandujano- pt doing well, moving arms well, no diarrhea, eating drinking well. But Cr cleo to 2.9- hence renal consulted and started on IVF- no obvious etiology for the ANGELITO/Cr 2.9. also have halved the doses of the Tacrolimus. Renal following. Also d/w family, they are refusing SNF and will take care of him at home. HH will be ordered.  ESR/CRP very high- likely from his C Diff and Gout.     Interval History: Appreciate Dr. Mandujano- pt doing well, moving arms well, no diarrhea, eating drinking well. But Cr cleo to 2.9- hence renal consulted and started on IVF- no obvious etiology for the ANGELITO/Cr 2.9. also have halved the doses of the Tacrolimus. Renal following. Also d/w family, they are refusing SNF and will take care of him at home. HH will be ordered. ESR/CRP very high- likely from his C Diff and Gout.      Review of Systems   Constitutional:  Positive for activity change and appetite change. Negative for fatigue and fever.   HENT:  Negative for sinus pressure.    Eyes:  Negative for visual disturbance.   Respiratory:   Negative for shortness of breath.    Cardiovascular:  Positive for leg swelling. Negative for chest pain.   Gastrointestinal:  Negative for nausea and vomiting.   Genitourinary:  Negative for difficulty urinating.   Musculoskeletal:  Positive for arthralgias and joint swelling. Negative for back pain.   Skin:  Negative for rash.   Neurological:  Positive for weakness. Negative for headaches.   Psychiatric/Behavioral:  Negative for confusion.      Objective:     Vital Signs (Most Recent):  Temp: 97.5 °F (36.4 °C) (03/17/25 1234)  Pulse: 75 (03/17/25 1705)  Resp: 20 (03/17/25 1639)  BP: 129/70 (03/17/25 1639)  SpO2: 97 % (03/17/25 1639) Vital Signs (24h Range):  Temp:  [97 °F (36.1 °C)-98.1 °F (36.7 °C)] 97.5 °F (36.4 °C)  Pulse:  [] 75  Resp:  [20] 20  SpO2:  [96 %-99 %] 97 %  BP: (114-142)/(57-71) 129/70     Weight: 117 kg (257 lb 15 oz)  Body mass index is 34.98 kg/m².    Intake/Output Summary (Last 24 hours) at 3/17/2025 1813  Last data filed at 3/17/2025 1800  Gross per 24 hour   Intake 1220.24 ml   Output 800 ml   Net 420.24 ml         Physical Exam  Vitals and nursing note reviewed.   Constitutional:       General: He is awake. He is not in acute distress.     Appearance: He is well-developed. He is obese. He is ill-appearing. He is not diaphoretic.   HENT:      Head: Normocephalic and atraumatic.      Right Ear: External ear normal.      Left Ear: External ear normal.      Nose: Nose normal.      Mouth/Throat:      Mouth: Mucous membranes are moist.      Pharynx: Oropharynx is clear.   Eyes:      General: No scleral icterus.        Right eye: No discharge.         Left eye: No discharge.      Extraocular Movements: Extraocular movements intact.      Conjunctiva/sclera: Conjunctivae normal.      Pupils: Pupils are equal, round, and reactive to light.   Cardiovascular:      Rate and Rhythm: Normal rate and regular rhythm.      Pulses: Normal pulses.      Heart sounds: Normal heart sounds. No murmur  heard.     No friction rub. No gallop.   Pulmonary:      Effort: Pulmonary effort is normal. No respiratory distress.      Breath sounds: Normal breath sounds. No stridor. No wheezing or rales.   Abdominal:      General: Bowel sounds are normal. There is no distension.      Palpations: Abdomen is soft. There is no mass.      Tenderness: There is no abdominal tenderness. There is no guarding.   Musculoskeletal:         General: Normal range of motion.      Cervical back: Normal range of motion and neck supple. No rigidity or tenderness.      Right lower leg: No edema.      Left lower leg: No edema.      Comments: Bilateral upper extremity swelling and tenderness   Lymphadenopathy:      Cervical: No cervical adenopathy.   Skin:     General: Skin is warm.      Capillary Refill: Capillary refill takes 2 to 3 seconds.      Findings: No erythema.   Neurological:      General: No focal deficit present.      Mental Status: He is alert and oriented to person, place, and time.      Motor: Weakness present.      Gait: Gait abnormal.   Psychiatric:         Mood and Affect: Mood normal.         Behavior: Behavior normal. Behavior is cooperative.               Significant Labs: All pertinent labs within the past 24 hours have been reviewed.  BMP:   Recent Labs   Lab 03/17/25  0640   *   *   K 4.3      CO2 16*   BUN 75*   CREATININE 2.9*   CALCIUM 7.9*     CBC:   Recent Labs   Lab 03/16/25  0300 03/16/25  0700 03/17/25  0640   WBC  --  4.28 3.16*   HGB  --  9.0* 8.7*   HCT 27* 30.4* 29.1*   PLT  --  264 239       Significant Imaging: I have reviewed all pertinent imaging results/findings within the past 24 hours.      Assessment & Plan  Acute on chronic systolic congestive heart failure  Patient has Systolic (HFrEF) heart failure that is Acute on chronic. On presentation their CHF was decompensated. Evidence of decompensated CHF on presentation includes: edema and weight gain. The etiology of their  "decompensation is likely increased fluid intake. Most recent BNP and echo results are listed below.  No results for input(s): "BNP" in the last 72 hours.    Latest ECHO  Results for orders placed during the hospital encounter of 11/26/24    Echo    Interpretation Summary    Left Ventricle: The left ventricle is normal in size. Normal wall thickness. There is concentric hypertrophy. Normal wall motion. Septal motion is consistent with bundle branch block. There is moderately reduced systolic function. Ejection fraction is approximately 35%. Grade I diastolic dysfunction.    Right Ventricle: Normal right ventricular cavity size. Wall thickness is normal. Systolic function is normal.    IVC/SVC: Normal venous pressure at 3 mmHg.    Current Heart Failure Medications  metoprolol succinate (TOPROL-XL) 24 hr tablet 25 mg, Daily, Oral  hydrALAZINE injection 10 mg, Every 6 hours PRN, Intravenous    Plan  - Monitor strict I&Os and daily weights.    - Place on telemetry  - Low sodium diet  - Place on fluid restriction of 1.5 L.   - Cardiology has not been consulted    Will repeat echo  Continue Lasix    Bun/Cr rising- hold lasix  Give gentle hydration    Started on IVF  C. difficile colitis  Patient is still reports diarrhea   Continue isolation   Vancomycin p.o.    Cont vanc po    Now on Day 7/10 of oral vanc  Add Questran to control diarrhea    Diarrhea better  Acute gout of left elbow  Good response to Solumedrol and Colchicine- cont    Much better  Check ESR, CRP in am    Improving. Cont steroids  D/c colchicine  ANGELITO on CKD 4  Creatine stable for now. BMP reviewed- noted Estimated Creatinine Clearance: 30.9 mL/min (A) (based on SCr of 2.9 mg/dL (H)). according to latest data. Based on current GFR, CKD stage is stage 4 - GFR 15-29.  Monitor UOP and serial BMP and adjust therapy as needed. Renally dose meds. Avoid nephrotoxic medications and procedures.    Likely sec to lasix, Diarrhea- treat w IVF, hold Lasix    Etiology " unclear for ANGELITO- started on IVF  Deep vein thrombosis (DVT) of lower extremity  Continue Eliquis   Lower extremity ultrasound    Anemia, chronic disease  Stable   Continue to monitor  Chronic anticoagulation  Continue Eliquis    Type II diabetes mellitus with renal manifestations  Patient's FSGs are controlled on current medication regimen.  Last A1c reviewed-   Lab Results   Component Value Date    HGBA1C 5.7 (H) 12/17/2024     Most recent fingerstick glucose reviewed-   Recent Labs   Lab 03/16/25  2102 03/17/25  0647 03/17/25  1207 03/17/25  1722   POCTGLUCOSE 301* 261* 313* 293*     Current correctional scale  Low  Maintain anti-hyperglycemic dose as follows-   Antihyperglycemics (From admission, onward)      Start     Stop Route Frequency Ordered    03/16/25 0900  insulin glargine U-100 (Lantus) pen 25 Units         -- SubQ 2 times daily 03/16/25 0225    03/16/25 0411  insulin aspart U-100 pen 0-10 Units         -- SubQ Every 4 hours PRN 03/16/25 0312          Hold Oral hypoglycemics while patient is in the hospital.    BS high sec to steroids, which were lowered and lantus/SSI increased    BS still high, cont Lantus/SSI  Chronic immunosuppression with Prograf, MMF and prednisone  Continue Prograf and CellCept   Hold mycophenolate    On Solumedrol for acute gout at present    Lower the dose due to ANGELITO  Renal following  VTE Risk Mitigation (From admission, onward)           Ordered     apixaban tablet 5 mg  2 times daily         03/14/25 0236                    Discharge Planning   GRETEL:      Code Status: Full Code   Medical Readiness for Discharge Date:   Discharge Plan A: Home with family, Home Health        Please place Justification for DME    Jaymie Medley MD  Department of Hospital Medicine   [facility]

## 2025-03-17 NOTE — ASSESSMENT & PLAN NOTE
"Patient has Systolic (HFrEF) heart failure that is Acute on chronic. On presentation their CHF was decompensated. Evidence of decompensated CHF on presentation includes: edema and weight gain. The etiology of their decompensation is likely increased fluid intake. Most recent BNP and echo results are listed below.  No results for input(s): "BNP" in the last 72 hours.    Latest ECHO  Results for orders placed during the hospital encounter of 11/26/24    Echo    Interpretation Summary    Left Ventricle: The left ventricle is normal in size. Normal wall thickness. There is concentric hypertrophy. Normal wall motion. Septal motion is consistent with bundle branch block. There is moderately reduced systolic function. Ejection fraction is approximately 35%. Grade I diastolic dysfunction.    Right Ventricle: Normal right ventricular cavity size. Wall thickness is normal. Systolic function is normal.    IVC/SVC: Normal venous pressure at 3 mmHg.    Current Heart Failure Medications  metoprolol succinate (TOPROL-XL) 24 hr tablet 25 mg, Daily, Oral  hydrALAZINE injection 10 mg, Every 6 hours PRN, Intravenous    Plan  - Monitor strict I&Os and daily weights.    - Place on telemetry  - Low sodium diet  - Place on fluid restriction of 1.5 L.   - Cardiology has not been consulted    Will repeat echo  Continue Lasix    Bun/Cr rising- hold lasix  Give gentle hydration    Started on IVF  "

## 2025-03-17 NOTE — PT/OT/SLP EVAL
Occupational Therapy Evaluation and Treatment    Name: Mitch Whittaker  MRN: [id number]  Admitting Diagnosis: Acute on chronic systolic congestive heart failure  Recent Surgery: * No surgery found *      Recommendations:     Discharge Recommendations: Moderate Intensity Therapy  Level of Assistance Recommended: 24 hours light assistance  Discharge Equipment Recommendations: none  Barriers to discharge: None    Assessment:     Mitch Whittaker is a 69 y.o. male with a medical diagnosis of Acute on chronic systolic congestive heart failure. He presents with performance deficits affecting function including weakness, impaired self care skills, impaired balance, impaired endurance, gait instability, impaired functional mobility, impaired coordination, decreased lower extremity function.     Rehab Prognosis: Fair; patient would benefit from acute OT services to address these deficits and reach maximum level of function.    Plan:     Patient to be seen 2 x/week to address the above listed problems via self-care/home management, therapeutic activities, therapeutic exercises  Plan of Care Expires: 03/31/25  Plan of Care Reviewed with: patient    Subjective     Chief Complaint: DEBILITY AND GENERALIZED WEAKNESS  Patient Comments/Goals: GET STRONGER  Pain Rating 1: 8/10  Location - Side 1: Left  Location - Orientation 1: generalized  Pain Addressed 1: Reposition    Patients cultural, spiritual, Faith conflicts given the current situation:      Social History:  Living Environment: Patient lives with their daughter in a single story home with number of outside stair(s): 0  Prior Level of Function: Prior to admission, patient was modified independent  Roles and Routines: Patient was driving and not working prior to admission.  Equipment Used at Home: bedside commode, hospital bed, slide board  Assistance Upon Discharge: facility staff    Objective:     Communicated with NURSE HEARN AND EPIC CHART REVIEW prior to session.  Patient found HOB elevated with telemetry upon OT entry to room.    General Precautions: Standard, fall, special contact   Orthopedic Precautions: N/A   Braces: N/A    Respiratory Status: Room air    Occupational Performance        Bed Mobility:   Rolling/Turning to Left with moderate assistance and WITH LEG LIFT  Scooting anteriorly to EOB to have both feet planted on floor: moderate assistance and WITH LEG LIFT  Supine to sit from left side of bed with moderate assistance    Functional Mobility/Transfers:  Sit <> Stand Transfer with dependent with rolling walker  Functional Mobility: UNABLE TO PERFORM TASK    Activities of Daily Living:  Grooming: maximal assistance  Upper Body Dressing: maximal assistance  Lower Body Dressing: total assistance  Toileting: maximal assistance    Cognitive/Visual Perceptual:  Cognitive/Psychosocial Skills:    -     Oriented to: Person, Place, Time, Situation  -     Follows Commands/attention: Follows two-step commands  -     Communication: clear/fluent  -     Memory: NO DEFICITS  -     Safety awareness/insight to disability: impaired    Physical Exam:  Upper Extremity Range of Motion:     -       Right Upper Extremity: APPROX 90 DEGREES WITH SHOULDER FLEXION  -       Left Upper Extremity: APPROX 90 DEGRRES WITH SHOULDER FLEXION  Upper Extremity Strength:    -       Right Upper Extremity: MMT: 2/5 GROSSLY  -       Left Upper Extremity: MMT: 2/5 GROSSLY   Strength:    -       Right Upper Extremity: MMT: 3/5 GROSSLY  -       Left Upper Extremity: MMT: 3/5 GROSSLY    AMPAC 6 Click ADL:  AMPAC Total Score: 14    Treatment & Education:  Educated patient on importance of increased tolerance to upright position and direct impact on CV endurance and strength. Patient encouraged to sit up EOB, for a minimum of 2 consecutive hours including for all meals.. Encouraged patient to perform AROM TE to BUE throughout the day within all available planes of motion. Re enforced importance of  utilizing call light to meet needs in room and not attempt to get up without staff assistance. Patient verbalized understanding and agreed to comply.           Patient not clear to transfer with RN/PCT    Patient left sitting edge of bed with all lines intact, call button in reach, RN notified, and spouse and PCT present.    GOALS:   Multidisciplinary Problems       Occupational Therapy Goals          Problem: Occupational Therapy    Goal Priority Disciplines Outcome Interventions   Occupational Therapy Goal     OT, PT/OT     Description: O.T. GOALS TO BE MET BY 3-31-25  PT WILL TOLERATE 1 SET X15 REPS B UE ROM EXERCISE  MIN A WITH UE DRESSING  MOD A WITH BSC TRANSFER                         DME Justifications:  TBD DEPENDING ON PROGRESS    History:     Past Medical History:   Diagnosis Date    Acquired renal cyst of left kidney     Anemia associated with chronic renal failure     CAD (coronary artery disease)     nonobstructive lhc 9/14    CHF (congestive heart failure)     Chronic immunosuppression with Prograf and MMF 06/18/2015    Chronic venous insufficiency of lower extremity     CKD (chronic kidney disease) stage 3, GFR 30-59 ml/min     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 RNA NEGATIVE 5/2015, 2021, 2022    History of colon polyps     HPTH (hyperparathyroidism)     Hyperlipidemia     Hypertension associated with stage 3 chronic kidney disease due to type 2 diabetes mellitus     LBBB (left bundle branch block) 12/20/2021    Morbid obesity with BMI of 45.0-49.9, adult     Nephrolithiasis 6/7/2013    PCO (posterior capsular opacification), left 03/04/2019     Proteinuria     resolved s/p transplant    S/P kidney transplant     Sleep apnea     Type 2 diabetes, uncontrolled, with retinopathy     Type II diabetes mellitus with renal manifestations          Past Surgical History:   Procedure Laterality Date    CARDIAC CATHETERIZATION  01/01/2008    normal coronary    CARPAL TUNNEL RELEASE Right 12/01/2023    Procedure: RELEASE, CARPAL TUNNEL;  Surgeon: Noel Almonte MD;  Location: [facility] OR;  Service: Orthopedics;  Laterality: Right;    CARPAL TUNNEL RELEASE Left 7/18/2024    Procedure: RELEASE, CARPAL TUNNEL;  Surgeon: Noel Almonte MD;  Location: [facility] OR;  Service: Orthopedics;  Laterality: Left;    COLONOSCOPY N/A 04/05/2018    Procedure: COLONOSCOPY;  Surgeon: Chava Ronquillo MD;  Location: [facility] ENDO;  Service: Endoscopy;  Laterality: N/A;    COLONOSCOPY N/A 05/02/2022    Procedure: COLONOSCOPY;  Surgeon: Alix Puente MD;  Location: [facility] ENDO;  Service: Endoscopy;  Laterality: N/A;    COLONOSCOPY N/A 06/07/2023    Procedure: COLONOSCOPY - rule out CMV  Cardiac clearance/Eliquis hold approval received on 05/21/23 per Dr. Meade, cardiology.  Note in encounters.  LB;  Surgeon: Daniella Shah MD;  Location: [facility];  Service: Endoscopy;  Laterality: N/A;    ESOPHAGOGASTRODUODENOSCOPY N/A 03/26/2024    Procedure: EGD (ESOPHAGOGASTRODUODENOSCOPY) 3/1-pt cleared, ok to hold eliquis for 3 days;  Surgeon: Daniella Shah MD;  Location: [facility];  Service: Endoscopy;  Laterality: N/A;    KIDNEY TRANSPLANT  2015    RETINAL LASER PROCEDURE         Time Tracking:     OT Date of Treatment: 03/17/25  OT Start Time: 1050  OT Stop Time: 1130  OT Total Time (min): 40 min    Billable Minutes: Evaluation 10 MINUTES, Self Care/Home Management 15 MINUTES, and Therapeutic Activity 15 MINUTES    3/17/2025

## 2025-03-17 NOTE — ASSESSMENT & PLAN NOTE
Good response to Solumedrol and Colchicine- cont    Much better  Check ESR, CRP in am    Improving. Cont steroids  D/c colchicine

## 2025-03-17 NOTE — SUBJECTIVE & OBJECTIVE
Interval History: Appreciate Dr. Mandujano- pt doing well, moving arms well, no diarrhea, eating drinking well. But Cr cleo to 2.9- hence renal consulted and started on IVF- no obvious etiology for the ANGELITO/Cr 2.9. also have halved the doses of the Tacrolimus. Renal following. Also d/w family, they are refusing SNF and will take care of him at home. HH will be ordered. ESR/CRP very high- likely from his C Diff and Gout.      Review of Systems   Constitutional:  Positive for activity change and appetite change. Negative for fatigue and fever.   HENT:  Negative for sinus pressure.    Eyes:  Negative for visual disturbance.   Respiratory:  Negative for shortness of breath.    Cardiovascular:  Positive for leg swelling. Negative for chest pain.   Gastrointestinal:  Negative for nausea and vomiting.   Genitourinary:  Negative for difficulty urinating.   Musculoskeletal:  Positive for arthralgias and joint swelling. Negative for back pain.   Skin:  Negative for rash.   Neurological:  Positive for weakness. Negative for headaches.   Psychiatric/Behavioral:  Negative for confusion.      Objective:     Vital Signs (Most Recent):  Temp: 97.5 °F (36.4 °C) (03/17/25 1234)  Pulse: 75 (03/17/25 1705)  Resp: 20 (03/17/25 1639)  BP: 129/70 (03/17/25 1639)  SpO2: 97 % (03/17/25 1639) Vital Signs (24h Range):  Temp:  [97 °F (36.1 °C)-98.1 °F (36.7 °C)] 97.5 °F (36.4 °C)  Pulse:  [] 75  Resp:  [20] 20  SpO2:  [96 %-99 %] 97 %  BP: (114-142)/(57-71) 129/70     Weight: 117 kg (257 lb 15 oz)  Body mass index is 34.98 kg/m².    Intake/Output Summary (Last 24 hours) at 3/17/2025 1813  Last data filed at 3/17/2025 1800  Gross per 24 hour   Intake 1220.24 ml   Output 800 ml   Net 420.24 ml         Physical Exam  Vitals and nursing note reviewed.   Constitutional:       General: He is awake. He is not in acute distress.     Appearance: He is well-developed. He is obese. He is ill-appearing. He is not diaphoretic.   HENT:      Head:  Normocephalic and atraumatic.      Right Ear: External ear normal.      Left Ear: External ear normal.      Nose: Nose normal.      Mouth/Throat:      Mouth: Mucous membranes are moist.      Pharynx: Oropharynx is clear.   Eyes:      General: No scleral icterus.        Right eye: No discharge.         Left eye: No discharge.      Extraocular Movements: Extraocular movements intact.      Conjunctiva/sclera: Conjunctivae normal.      Pupils: Pupils are equal, round, and reactive to light.   Cardiovascular:      Rate and Rhythm: Normal rate and regular rhythm.      Pulses: Normal pulses.      Heart sounds: Normal heart sounds. No murmur heard.     No friction rub. No gallop.   Pulmonary:      Effort: Pulmonary effort is normal. No respiratory distress.      Breath sounds: Normal breath sounds. No stridor. No wheezing or rales.   Abdominal:      General: Bowel sounds are normal. There is no distension.      Palpations: Abdomen is soft. There is no mass.      Tenderness: There is no abdominal tenderness. There is no guarding.   Musculoskeletal:         General: Normal range of motion.      Cervical back: Normal range of motion and neck supple. No rigidity or tenderness.      Right lower leg: No edema.      Left lower leg: No edema.      Comments: Bilateral upper extremity swelling and tenderness   Lymphadenopathy:      Cervical: No cervical adenopathy.   Skin:     General: Skin is warm.      Capillary Refill: Capillary refill takes 2 to 3 seconds.      Findings: No erythema.   Neurological:      General: No focal deficit present.      Mental Status: He is alert and oriented to person, place, and time.      Motor: Weakness present.      Gait: Gait abnormal.   Psychiatric:         Mood and Affect: Mood normal.         Behavior: Behavior normal. Behavior is cooperative.               Significant Labs: All pertinent labs within the past 24 hours have been reviewed.  BMP:   Recent Labs   Lab 03/17/25  0640   *   NA  132*   K 4.3      CO2 16*   BUN 75*   CREATININE 2.9*   CALCIUM 7.9*     CBC:   Recent Labs   Lab 03/16/25  0300 03/16/25  0700 03/17/25  0640   WBC  --  4.28 3.16*   HGB  --  9.0* 8.7*   HCT 27* 30.4* 29.1*   PLT  --  264 239       Significant Imaging: I have reviewed all pertinent imaging results/findings within the past 24 hours.

## 2025-03-17 NOTE — CONSULTS
Nephrology Consultation  Consulting Physician:  GALA  Reason for consultation:  ESRD WITH [facility] RENAL TRANSPLANT  Informants: PATIENT, WIFE AND COMPUTER     History of Present Illness   The patient is a 69 y.o. male with a hx of Type 2 diabetes mellitus, diabetic retinopathy, diabetic neuropathy, diabetic nephropathy, end-stage renal disease previously had dialysis prior to his transplant in 2015; unclear whether or not he has ever had acute rejection or infectious complications), coronary artery disease, congestive heart failure, chronic immunosuppression (Prograf and mycophenolate), anemia renal failure, secondary hyperparathyroidism renal origin, hepatitis-C positivity (unclear whether or not this was treated), left bundle branch block, morbid obesity, obstructive sleep apnea.  He was admitted on 03/13/2025 for arm swelling.  Thereafter he was told that he had gout.  It appears that his baseline serum creatinine is somewhere between 1.4 and 1.8.  Since admission this has gradually risen to 2.9 today.  Most recent tacrolimus level from 03/15/2025 was 6.4.  He states that he is eating normally.  His gout was treated with colchicine and no nonsteroidals.  He has had no contrast imaging studies.  I have been asked to comment on this.      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 RNA NEGATIVE 5/2015, 2021, 2022    History of colon polyps     HPTH (hyperparathyroidism)     Hyperlipidemia     Hypertension associated with stage 3 chronic kidney disease due to type 2 diabetes mellitus     LBBB (left bundle branch block) 12/20/2021    Morbid obesity with BMI of 45.0-49.9, adult     Nephrolithiasis 6/7/2013    PCO (posterior capsular opacification), left 03/04/2019    Proteinuria     resolved s/p transplant    S/P kidney transplant     Sleep apnea     Type 2 diabetes, uncontrolled, with retinopathy     Type II diabetes mellitus with renal manifestations          PSHx:  Past Surgical History:   Procedure Laterality Date    CARDIAC CATHETERIZATION  01/01/2008    normal coronary    CARPAL TUNNEL RELEASE Right 12/01/2023    Procedure: RELEASE, CARPAL TUNNEL;  Surgeon: Noel Almonte MD;  Location: [facility];  Service: Orthopedics;  Laterality: Right;    CARPAL TUNNEL RELEASE Left 7/18/2024    Procedure: RELEASE, CARPAL TUNNEL;  Surgeon: Noel Almonte MD;  Location: [facility] OR;  Service: Orthopedics;  Laterality: Left;    COLONOSCOPY N/A 04/05/2018    Procedure: COLONOSCOPY;  Surgeon: Chava Ronquillo MD;  Location: [facility];  Service: Endoscopy;  Laterality: N/A;    COLONOSCOPY N/A 05/02/2022    Procedure: COLONOSCOPY;  Surgeon: Alix Puente MD;  Location: [facility];  Service: Endoscopy;  Laterality: N/A;    COLONOSCOPY N/A 06/07/2023    Procedure: COLONOSCOPY - rule out CMV  Cardiac clearance/Eliquis hold approval received on 05/21/23 per Dr. Meade, cardiology.  Note in encounters.  LB;  Surgeon: Daniella Shah MD;  Location: [facility];  Service: Endoscopy;  Laterality: N/A;    ESOPHAGOGASTRODUODENOSCOPY N/A 03/26/2024    Procedure: EGD (ESOPHAGOGASTRODUODENOSCOPY) 3/1-pt cleared, ok to hold eliquis for 3 days;  Surgeon: Daniella Shah MD;  Location:  [facility] ENDO;  Service: Endoscopy;  Laterality: N/A;    KIDNEY TRANSPLANT  2015    RETINAL LASER PROCEDURE           SocHx:    Social History[1]      FamHx:    Family History   Problem Relation Name Age of Onset    Diabetes Mother      Hypertension Mother      Heart failure Mother      Heart failure Father      Kidney disease Sister          ESRD    Diabetes Sister      Diabetes Maternal Grandmother      Cancer Neg Hx           ROS:    Positive for arm swelling.  He denies fever chills, nausea and vomiting, diarrhea, dysuria, urgency, frequency, myalgias arthralgias, shortness of breath, cough, chest pain, abdominal pain, change in bowel habits.  A 14 point review of systems otherwise negative.     Allergies:    is allergic to lisinopril, actos  [pioglitazone], and metformin.    Current medications:   Scheduled Meds:   apixaban  5 mg Oral BID    aspirin  81 mg Oral Daily    calcitRIOL  0.25 mcg Oral Daily    cholestyramine  1 packet Oral BID    colchicine  0.6 mg Oral Daily    famotidine  20 mg Oral Daily    ferrous sulfate  1 tablet Oral Every other day    folic acid-vit B6-vit B12 2.5-25-2 mg  1 tablet Oral Daily    insulin glargine U-100  25 Units Subcutaneous BID    metoprolol succinate  25 mg Oral Daily    predniSONE  10 mg Oral BID    [START ON 3/18/2025] tacrolimus  2 mg Oral Daily AM    And    tacrolimus  1.5 mg Oral Daily PM    vancomycin  125 mg Oral Q6H     Continuous Infusions:   0.9% NaCl   Intravenous Continuous 75 mL/hr at 03/17/25 0653 Rate Verify at 03/17/25 0653     PRN Meds:.  Current Facility-Administered Medications:     acetaminophen, 650 mg, Oral, Q6H PRN    dextrose 50%, 12.5 g, Intravenous, PRN    dextrose 50%, 25 g, Intravenous, PRN    glucagon (human recombinant), 1 mg, Intramuscular, PRN    glucose, 16 g, Oral, PRN    glucose, 24 g, Oral, PRN    hydrALAZINE, 10 mg, Intravenous, Q6H PRN    HYDROcodone-acetaminophen, 1 tablet, Oral, Q6H PRN    insulin aspart U-100, 0-10 Units, Subcutaneous,  Q4H PRN    morphine, 4 mg, Intravenous, Q6H PRN    ondansetron, 4 mg, Oral, Q6H PRN       Physical Examination    VS/Measurements   BP (!) 114/57 (BP Location: Left arm, Patient Position: Lying)   Pulse 70   Temp 97.5 °F (36.4 °C) (Oral)   Resp 20   Ht 6' (1.829 m)   Wt 117 kg (257 lb 15 oz)   SpO2 99%   BMI 34.98 kg/m²     Physical Exam   General: He appears well.  HEENT: Extraocular movements are intact, sclerae anicteric, nose and ears are grossly normal  Lungs: Clear to auscultation bilaterally  Cardiovascular: S1 and S2 are present without gallop  Abdomen: Nondistended, soft, nontender, bowel sounds present  Musculoskeletal:  Warm and dry; chronic venous stasis changes over both lower extremities  Psychiatric: Alert and oriented      Laboratory Results   Today's Lab Results :    Recent Results (from the past 24 hours)   POCT glucose    Collection Time: 03/16/25  4:36 PM   Result Value Ref Range    POCT Glucose 313 (H) 70 - 110 mg/dL   POCT glucose    Collection Time: 03/16/25  9:02 PM   Result Value Ref Range    POCT Glucose 301 (H) 70 - 110 mg/dL   CBC Auto Differential    Collection Time: 03/17/25  6:40 AM   Result Value Ref Range    WBC 3.16 (L) 3.90 - 12.70 K/uL    RBC 3.60 (L) 4.60 - 6.20 M/uL    Hemoglobin 8.7 (L) 14.0 - 18.0 g/dL    Hematocrit 29.1 (L) 40.0 - 54.0 %    MCV 81 (L) 82 - 98 fL    MCH 24.2 (L) 27.0 - 31.0 pg    MCHC 29.9 (L) 32.0 - 36.0 g/dL    RDW 15.0 (H) 11.5 - 14.5 %    Platelets 239 150 - 450 K/uL    MPV 10.4 9.2 - 12.9 fL    Immature Granulocytes CANCELED 0.0 - 0.5 %    Immature Grans (Abs) CANCELED 0.00 - 0.04 K/uL    Lymph # CANCELED 1.0 - 4.8 K/uL    Mono # CANCELED 0.3 - 1.0 K/uL    Eos # CANCELED 0.0 - 0.5 K/uL    Baso # CANCELED 0.00 - 0.20 K/uL    nRBC 1 (A) 0 /100 WBC    Gran % 76.0 (H) 38.0 - 73.0 %    Lymph % 19.0 18.0 - 48.0 %    Mono % 5.0 4.0 - 15.0 %    Eosinophil % 0.0 0.0 - 8.0 %    Basophil % 0.0 0.0 - 1.9 %    Platelet Estimate Appears normal     Poik Slight      Target Cells Occasional     Tear Drop Cells Occasional     Acanthocytes Present     Schistocytes Present     Differential Method Manual    Basic Metabolic Panel    Collection Time: 03/17/25  6:40 AM   Result Value Ref Range    Sodium 132 (L) 136 - 145 mmol/L    Potassium 4.3 3.5 - 5.1 mmol/L    Chloride 106 95 - 110 mmol/L    CO2 16 (L) 23 - 29 mmol/L    Glucose 289 (H) 70 - 110 mg/dL    BUN 75 (H) 8 - 23 mg/dL    Creatinine 2.9 (H) 0.5 - 1.4 mg/dL    Calcium 7.9 (L) 8.7 - 10.5 mg/dL    Anion Gap 10 8 - 16 mmol/L    eGFR 22 (A) >60 mL/min/1.73 m^2   Sedimentation rate    Collection Time: 03/17/25  6:40 AM   Result Value Ref Range    Sed Rate >120 (H) 0 - 23 mm/Hr   C-Reactive Protein    Collection Time: 03/17/25  6:40 AM   Result Value Ref Range    CRP 93.1 (H) 0.0 - 8.2 mg/L   POCT glucose    Collection Time: 03/17/25  6:47 AM   Result Value Ref Range    POCT Glucose 261 (H) 70 - 110 mg/dL        Assessment and Plan   1 cadaveric renal transplant:  This was done in 2015 and it appears that 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  Mood is unclear whether or not if he has ever had an episode of acute rejection or any type of infectious or out an anatomical complications.  His most recent tacrolimus level is slightly high given the duration of his transplant but not terribly.    2. Acute kidney injury:  There is no overt reason for this.  I can not find any evidence of hypotension, volume depletion, nephrotoxic medications or iodinated contrast exposure.  At this point the treatment will be supportive.  His sed rate and CRP year extremely elevated but this could be related to his infectious diarrhea with C difficile or gout.  I do not believe he has acute rejection but if his serum creatinine continues to climb would give consideration to percutaneous transplant biopsy.    3. Immunosuppression.  Continue with mycophenolate and tacrolimus.  Also on prednisone 10 mg b.i.d. (I believe this is for  his gout).    4. C. Diff  colitis:  Denies diarrhea.  Currently receiving oral vancomycin    5. Anemia of chronic kidney disease:  We will give erythropoietin.      We will follow, thank you      ________________________________________________  Sal Mandujano          [1]   Social History  Socioeconomic History    Marital status: Legally     Number of children: 2   Occupational History    Occupation: retired     Employer: Retired   Tobacco Use    Smoking status: Former     Current packs/day: 0.00     Types: Cigarettes     Quit date: 2013     Years since quittin.8     Passive exposure: Past    Smokeless tobacco: Former     Quit date: 2013    Tobacco comments:     used marijuana since [id number], stopped after started dialysis   Substance and Sexual Activity    Alcohol use: No     Alcohol/week: 0.0 standard drinks of alcohol    Drug use: Not Currently     Comment:      Sexual activity: Never   Social History Narrative    . Lives with spouse. Has 2 children. Patient retired as  for [employer]. He has been washing cars.     Social Drivers of Health     Financial Resource Strain: Patient Declined (3/16/2025)    Overall Financial Resource Strain (CARDIA)     Difficulty of Paying Living Expenses: Patient declined   Food Insecurity: Patient Declined (3/16/2025)    Hunger Vital Sign     Worried About Running Out of Food in the Last Year: Patient declined     Ran Out of Food in the Last Year: Patient declined   Transportation Needs: No Transportation Needs (2025)    Received from [facility] Transportation     Lack of Transportation (Medical): No     Lack of Transportation (Non-Medical): No   Physical Activity: Inactive (2025)    Exercise Vital Sign     Days of Exercise per Week: 0 days     Minutes of Exercise per Session: 0 min   Stress: Patient Declined (3/16/2025)    [employer] [place] [employer] - Occupational  Stress Questionnaire     Feeling of Stress : Patient declined   Housing Stability: Patient Declined (3/16/2025)    Housing Stability Vital Sign     Unable to Pay for Housing in the Last Year: Patient declined     Homeless in the Last Year: Patient declined

## 2025-03-17 NOTE — PLAN OF CARE
Discussed poc with pt, pt verbalized understanding    Purposeful rounding every 2hours    VS wnl  Cardiac monitoring in use, pt is NSR, tele monitor # [id number]  Blood glucose monitoring   Fall precautions in place, remains injury free  Pt denies c/o pain    IVFs  Accurate I&Os  Bed locked at lowest position  Call light within reach    Chart check complete  Will cont with POC

## 2025-03-17 NOTE — ASSESSMENT & PLAN NOTE
Patient is still reports diarrhea   Continue isolation   Vancomycin p.o.    Cont vanc po    Now on Day 7/10 of oral vanc  Add Questran to control diarrhea    Diarrhea better

## 2025-03-17 NOTE — ASSESSMENT & PLAN NOTE
Continue Prograf and CellCept   Hold mycophenolate    On Solumedrol for acute gout at present    Lower the dose due to ANGELITO  Renal following

## 2025-03-17 NOTE — ASSESSMENT & PLAN NOTE
Patient's FSGs are controlled on current medication regimen.  Last A1c reviewed-   Lab Results   Component Value Date    HGBA1C 5.7 (H) 12/17/2024     Most recent fingerstick glucose reviewed-   Recent Labs   Lab 03/16/25  2102 03/17/25  0647 03/17/25  1207 03/17/25  1722   POCTGLUCOSE 301* 261* 313* 293*     Current correctional scale  Low  Maintain anti-hyperglycemic dose as follows-   Antihyperglycemics (From admission, onward)      Start     Stop Route Frequency Ordered    03/16/25 0900  insulin glargine U-100 (Lantus) pen 25 Units         -- SubQ 2 times daily 03/16/25 0225    03/16/25 0411  insulin aspart U-100 pen 0-10 Units         -- SubQ Every 4 hours PRN 03/16/25 0312          Hold Oral hypoglycemics while patient is in the hospital.    BS high sec to steroids, which were lowered and lantus/SSI increased    BS still high, cont Lantus/SSI

## 2025-03-17 NOTE — PLAN OF CARE
03/17/25 1310   Rounds   Attendance Provider;Nurse ;Charge nurse;Physical therapist;Occupational therapist;Pharmacist   Discharge Plan A Home with family;Home Health   Why the patient remains in the hospital Requires continued medical care   Transition of Care Barriers None     Cierra lift for home used ordered per Dr. Medley. [facility] notified.    Update: per [facility] cierra lift will be delivered to patient's home today and tomorrow.

## 2025-03-17 NOTE — ASSESSMENT & PLAN NOTE
Creatine stable for now. BMP reviewed- noted Estimated Creatinine Clearance: 30.9 mL/min (A) (based on SCr of 2.9 mg/dL (H)). according to latest data. Based on current GFR, CKD stage is stage 4 - GFR 15-29.  Monitor UOP and serial BMP and adjust therapy as needed. Renally dose meds. Avoid nephrotoxic medications and procedures.    Likely sec to lasix, Diarrhea- treat w IVF, hold Lasix    Etiology unclear for ANGELITO- started on IVF

## 2025-03-18 LAB
ACANTHOCYTES BLD QL SMEAR: PRESENT
ALBUMIN SERPL BCP-MCNC: 2 G/DL (ref 3.5–5.2)
ANION GAP SERPL CALC-SCNC: 10 MMOL/L (ref 8–16)
ANION GAP SERPL CALC-SCNC: 10 MMOL/L (ref 8–16)
ANISOCYTOSIS BLD QL SMEAR: SLIGHT
BASOPHILS # BLD AUTO: ABNORMAL K/UL (ref 0–0.2)
BASOPHILS NFR BLD: 0 % (ref 0–1.9)
BUN SERPL-MCNC: 72 MG/DL (ref 8–23)
BUN SERPL-MCNC: 72 MG/DL (ref 8–23)
CALCIUM SERPL-MCNC: 7.9 MG/DL (ref 8.7–10.5)
CALCIUM SERPL-MCNC: 7.9 MG/DL (ref 8.7–10.5)
CHLORIDE SERPL-SCNC: 112 MMOL/L (ref 95–110)
CHLORIDE SERPL-SCNC: 112 MMOL/L (ref 95–110)
CO2 SERPL-SCNC: 15 MMOL/L (ref 23–29)
CO2 SERPL-SCNC: 15 MMOL/L (ref 23–29)
CREAT SERPL-MCNC: 2.2 MG/DL (ref 0.5–1.4)
CREAT SERPL-MCNC: 2.2 MG/DL (ref 0.5–1.4)
DIFFERENTIAL METHOD BLD: ABNORMAL
EOSINOPHIL # BLD AUTO: ABNORMAL K/UL (ref 0–0.5)
EOSINOPHIL NFR BLD: 0 % (ref 0–8)
ERYTHROCYTE [DISTWIDTH] IN BLOOD BY AUTOMATED COUNT: 15.2 % (ref 11.5–14.5)
EST. GFR  (NO RACE VARIABLE): 31 ML/MIN/1.73 M^2
EST. GFR  (NO RACE VARIABLE): 31 ML/MIN/1.73 M^2
GLUCOSE SERPL-MCNC: 259 MG/DL (ref 70–110)
GLUCOSE SERPL-MCNC: 259 MG/DL (ref 70–110)
HCT VFR BLD AUTO: 34.9 % (ref 40–54)
HGB BLD-MCNC: 10.2 G/DL (ref 14–18)
IMM GRANULOCYTES # BLD AUTO: ABNORMAL K/UL (ref 0–0.04)
IMM GRANULOCYTES NFR BLD AUTO: ABNORMAL % (ref 0–0.5)
LYMPHOCYTES # BLD AUTO: ABNORMAL K/UL (ref 1–4.8)
LYMPHOCYTES NFR BLD: 18 % (ref 18–48)
MCH RBC QN AUTO: 24.2 PG (ref 27–31)
MCHC RBC AUTO-ENTMCNC: 29.2 G/DL (ref 32–36)
MCV RBC AUTO: 83 FL (ref 82–98)
MONOCYTES # BLD AUTO: ABNORMAL K/UL (ref 0.3–1)
MONOCYTES NFR BLD: 7 % (ref 4–15)
NEUTROPHILS NFR BLD: 74 % (ref 38–73)
NEUTS BAND NFR BLD MANUAL: 1 %
NRBC BLD-RTO: 1 /100 WBC
OVALOCYTES BLD QL SMEAR: ABNORMAL
PHOSPHATE SERPL-MCNC: 4.3 MG/DL (ref 2.7–4.5)
PLATELET # BLD AUTO: 242 K/UL (ref 150–450)
PLATELET BLD QL SMEAR: ABNORMAL
PMV BLD AUTO: 10.7 FL (ref 9.2–12.9)
POCT GLUCOSE: 235 MG/DL (ref 70–110)
POCT GLUCOSE: 245 MG/DL (ref 70–110)
POCT GLUCOSE: 250 MG/DL (ref 70–110)
POCT GLUCOSE: 256 MG/DL (ref 70–110)
POIKILOCYTOSIS BLD QL SMEAR: SLIGHT
POTASSIUM SERPL-SCNC: 4.2 MMOL/L (ref 3.5–5.1)
POTASSIUM SERPL-SCNC: 4.2 MMOL/L (ref 3.5–5.1)
PTH-INTACT SERPL-MCNC: 257.2 PG/ML (ref 9–77)
RBC # BLD AUTO: 4.21 M/UL (ref 4.6–6.2)
SODIUM SERPL-SCNC: 137 MMOL/L (ref 136–145)
SODIUM SERPL-SCNC: 137 MMOL/L (ref 136–145)
WBC # BLD AUTO: 3.05 K/UL (ref 3.9–12.7)

## 2025-03-18 PROCEDURE — 63600175 PHARM REV CODE 636 W HCPCS: Performed by: EMERGENCY MEDICINE

## 2025-03-18 PROCEDURE — 97110 THERAPEUTIC EXERCISES: CPT

## 2025-03-18 PROCEDURE — 25000003 PHARM REV CODE 250: Performed by: EMERGENCY MEDICINE

## 2025-03-18 PROCEDURE — 85027 COMPLETE CBC AUTOMATED: CPT | Performed by: FAMILY MEDICINE

## 2025-03-18 PROCEDURE — 80069 RENAL FUNCTION PANEL: CPT | Performed by: INTERNAL MEDICINE

## 2025-03-18 PROCEDURE — 25000003 PHARM REV CODE 250: Performed by: FAMILY MEDICINE

## 2025-03-18 PROCEDURE — 83970 ASSAY OF PARATHORMONE: CPT | Performed by: INTERNAL MEDICINE

## 2025-03-18 PROCEDURE — 27000207 HC ISOLATION

## 2025-03-18 PROCEDURE — 85007 BL SMEAR W/DIFF WBC COUNT: CPT | Performed by: FAMILY MEDICINE

## 2025-03-18 PROCEDURE — 21400001 HC TELEMETRY ROOM

## 2025-03-18 PROCEDURE — 36415 COLL VENOUS BLD VENIPUNCTURE: CPT | Performed by: FAMILY MEDICINE

## 2025-03-18 PROCEDURE — 36415 COLL VENOUS BLD VENIPUNCTURE: CPT | Performed by: INTERNAL MEDICINE

## 2025-03-18 PROCEDURE — 80197 ASSAY OF TACROLIMUS: CPT | Performed by: EMERGENCY MEDICINE

## 2025-03-18 PROCEDURE — 99232 SBSQ HOSP IP/OBS MODERATE 35: CPT | Mod: FS,,, | Performed by: INTERNAL MEDICINE

## 2025-03-18 RX ORDER — SODIUM BICARBONATE 650 MG/1
1300 TABLET ORAL ONCE
Status: COMPLETED | OUTPATIENT
Start: 2025-03-18 | End: 2025-03-18

## 2025-03-18 RX ADMIN — COLCHICINE 0.6 MG: 0.6 TABLET ORAL at 09:03

## 2025-03-18 RX ADMIN — FERROUS SULFATE TAB 325 MG (65 MG ELEMENTAL FE) 1 EACH: 325 (65 FE) TAB at 09:03

## 2025-03-18 RX ADMIN — VANCOMYCIN HYDROCHLORIDE 125 MG: KIT at 05:03

## 2025-03-18 RX ADMIN — METOPROLOL SUCCINATE 25 MG: 25 TABLET, EXTENDED RELEASE ORAL at 09:03

## 2025-03-18 RX ADMIN — INSULIN ASPART 4 UNITS: 100 INJECTION, SOLUTION INTRAVENOUS; SUBCUTANEOUS at 05:03

## 2025-03-18 RX ADMIN — CALCITRIOL CAPSULES 0.25 MCG 0.25 MCG: 0.25 CAPSULE ORAL at 09:03

## 2025-03-18 RX ADMIN — SODIUM BICARBONATE 650 MG TABLET 1300 MG: at 11:03

## 2025-03-18 RX ADMIN — PREDNISONE 10 MG: 10 TABLET ORAL at 08:03

## 2025-03-18 RX ADMIN — FAMOTIDINE 20 MG: 20 TABLET, FILM COATED ORAL at 09:03

## 2025-03-18 RX ADMIN — TACROLIMUS 2 MG: 1 CAPSULE ORAL at 09:03

## 2025-03-18 RX ADMIN — VANCOMYCIN HYDROCHLORIDE 125 MG: KIT at 11:03

## 2025-03-18 RX ADMIN — INSULIN GLARGINE 25 UNITS: 100 INJECTION, SOLUTION SUBCUTANEOUS at 09:03

## 2025-03-18 RX ADMIN — INSULIN ASPART 4 UNITS: 100 INJECTION, SOLUTION INTRAVENOUS; SUBCUTANEOUS at 10:03

## 2025-03-18 RX ADMIN — APIXABAN 5 MG: 2.5 TABLET, FILM COATED ORAL at 09:03

## 2025-03-18 RX ADMIN — CHOLESTYRAMINE 4 G: 4 POWDER, FOR SUSPENSION ORAL at 09:03

## 2025-03-18 RX ADMIN — SODIUM CHLORIDE: 9 INJECTION, SOLUTION INTRAVENOUS at 06:03

## 2025-03-18 RX ADMIN — Medication 1 TABLET: at 09:03

## 2025-03-18 RX ADMIN — TACROLIMUS 1.5 MG: 0.5 CAPSULE ORAL at 05:03

## 2025-03-18 RX ADMIN — CHOLESTYRAMINE 4 G: 4 POWDER, FOR SUSPENSION ORAL at 08:03

## 2025-03-18 RX ADMIN — APIXABAN 5 MG: 2.5 TABLET, FILM COATED ORAL at 08:03

## 2025-03-18 RX ADMIN — INSULIN GLARGINE 25 UNITS: 100 INJECTION, SOLUTION SUBCUTANEOUS at 08:03

## 2025-03-18 RX ADMIN — ASPIRIN 81 MG: 81 TABLET, COATED ORAL at 09:03

## 2025-03-18 RX ADMIN — PREDNISONE 10 MG: 10 TABLET ORAL at 09:03

## 2025-03-18 RX ADMIN — INSULIN ASPART 6 UNITS: 100 INJECTION, SOLUTION INTRAVENOUS; SUBCUTANEOUS at 11:03

## 2025-03-18 NOTE — PROGRESS NOTES
[facility]  Progress Note    Patient Name: Mitch Whittaker  MRN: [id number]  Patient Class: IP- Inpatient   Admission Date: 3/13/2025  Length of Stay: 4 days  Attending Physician: Long Payan MD  Primary Care Provider: Valery Caal MD        Subjective     Principal Problem:Acute on chronic systolic congestive heart failure        HPI:  Patient is a 69-year-old  male with PMH of CHF, kidney transplant, CAD, DM, DVT who presents to the ED for complaints of pain and swelling to bilateral arms.  Of note patient was recently discharged on 03/12/2025 after being treated for ANGELITO and UTI.  Patient developed gross hematuria during stay and Eliquis was held.  Hematuria resolved and Eliquis was resumed prior to DC.  Patient tested positive for C diff and oral vancomycin was initiated.  Plan initially was for discharge back to [facility] however patient was unable to provide financials and was discharged home with home health.  Patient states since being discharged he has developed bilateral edema to his arms.  Tenderness also reported.  Denies any fever.  Patient still complains of diarrhea.    In the ED, H&H 9/29.6, BUN/CR 31/1.8, , D-dimer 3.5.  Chest x-ray showed mild vascular congestion.  Lasix 40 mg initiated.    Overview/Hospital Course:  69-year-old AAM with Hx of CHF, kidney transplant, CAD, DM, DVT who presented to the ED for pain and swelling to bilateral arms. Pt was just discharged on 03/12/2025 after being treated for ANGELITO and UTI and C diff colitis with oral Vanc. Pt developed gross hematuria during stay and Eliquis was held. Hematuria resolved and Eliquis was resumed prior to DC.  Patient tested positive for C diff and oral vancomycin was initiated.  Plan initially was to discharge back to [facility] however patient was unable to provide financials and was discharged home with home health.  Patient states since being discharged he has developed bilateral  edema to his arms.  Tenderness also reported.  Denies any fever.  Patient still complains of diarrhea.     In the ED, H&H 9/29.6, BUN/CR 31/1.8, , D-dimer 3.5.  Chest x-ray showed mild vascular congestion.  Lasix 40 mg initiated.      Also suspect mild gout- will give steroids and oral colchicine. Cont oral vanc for C diff.    3/15- looks and feels better, good response to steroids, BUE pain and swelling improving, able to lift his arms now. Cont present care. Will start PT/OT and start inj b12 plus triple vitamins, d/c home soon.     3/16- looks and feels better, more alert dn responsive, eating with both his hands easily, swelling, pain much better. Bun/Cr still rising, likely from diarrhea from C Diff- will add Questran and start IVF. Also BS was very high last evening sec to Prednisone for his gout- BS better now- will lower Prednisone and cont Lantus plus SSI.    3/17- Appreciate Dr. Mandujano- pt doing well, moving arms well, no diarrhea, eating drinking well. But Cr cleo to 2.9- hence renal consulted and started on IVF- no obvious etiology for the ANGELITO/Cr 2.9. also have halved the doses of the Tacrolimus. Renal following. Also d/w family, they are refusing SNF and will take care of him at home. HH will be ordered.  ESR/CRP very high- likely from his C Diff and Gout.     03/18/2025  Creatinine trending down with gentle IVF.  Continue current management.  Nephrology on case.  Bicarb supplementation.    Interval History:  No acute issues reported overnight.    Review of Systems   Constitutional:  Positive for activity change and appetite change. Negative for fatigue and fever.   HENT:  Negative for sinus pressure.    Eyes:  Negative for visual disturbance.   Respiratory:  Negative for shortness of breath.    Cardiovascular:  Positive for leg swelling. Negative for chest pain.   Gastrointestinal:  Negative for nausea and vomiting.   Genitourinary:  Negative for difficulty urinating.   Musculoskeletal:  Positive  for arthralgias and joint swelling. Negative for back pain.   Skin:  Negative for rash.   Neurological:  Positive for weakness. Negative for headaches.   Psychiatric/Behavioral:  Negative for confusion.      Objective:     Vital Signs (Most Recent):  Temp: 97.6 °F (36.4 °C) (03/18/25 0826)  Pulse: 75 (03/18/25 1051)  Resp: 18 (03/18/25 0826)  BP: 132/65 (03/18/25 0826)  SpO2: 99 % (03/18/25 0826) Vital Signs (24h Range):  Temp:  [97.4 °F (36.3 °C)-97.6 °F (36.4 °C)] 97.6 °F (36.4 °C)  Pulse:  [68-84] 75  Resp:  [18-20] 18  SpO2:  [97 %-99 %] 99 %  BP: (114-155)/(57-75) 132/65     Weight: 116.3 kg (256 lb 6.3 oz)  Body mass index is 34.77 kg/m².    Intake/Output Summary (Last 24 hours) at 3/18/2025 1133  Last data filed at 3/18/2025 0710  Gross per 24 hour   Intake 320 ml   Output 1650 ml   Net -1330 ml         Physical Exam  Vitals and nursing note reviewed.   Constitutional:       General: He is awake. He is not in acute distress.     Appearance: He is well-developed. He is obese. He is ill-appearing. He is not diaphoretic.   HENT:      Head: Normocephalic and atraumatic.      Right Ear: External ear normal.      Left Ear: External ear normal.      Nose: Nose normal.      Mouth/Throat:      Mouth: Mucous membranes are moist.      Pharynx: Oropharynx is clear.   Eyes:      General: No scleral icterus.        Right eye: No discharge.         Left eye: No discharge.      Extraocular Movements: Extraocular movements intact.      Conjunctiva/sclera: Conjunctivae normal.      Pupils: Pupils are equal, round, and reactive to light.   Cardiovascular:      Rate and Rhythm: Normal rate and regular rhythm.      Pulses: Normal pulses.      Heart sounds: Normal heart sounds. No murmur heard.     No friction rub. No gallop.   Pulmonary:      Effort: Pulmonary effort is normal. No respiratory distress.      Breath sounds: Normal breath sounds. No stridor. No wheezing or rales.   Abdominal:      General: Bowel sounds are normal.  "There is no distension.      Palpations: Abdomen is soft. There is no mass.      Tenderness: There is no abdominal tenderness. There is no guarding.   Musculoskeletal:         General: Normal range of motion.      Cervical back: Normal range of motion and neck supple. No rigidity or tenderness.      Right lower leg: No edema.      Left lower leg: No edema.      Comments: Bilateral upper extremity swelling and tenderness   Lymphadenopathy:      Cervical: No cervical adenopathy.   Skin:     General: Skin is warm.      Capillary Refill: Capillary refill takes 2 to 3 seconds.      Findings: No erythema.   Neurological:      General: No focal deficit present.      Mental Status: He is alert and oriented to person, place, and time.      Motor: Weakness present.      Gait: Gait abnormal.   Psychiatric:         Mood and Affect: Mood normal.         Behavior: Behavior normal. Behavior is cooperative.               Significant Labs: All pertinent labs within the past 24 hours have been reviewed.    Significant Imaging: I have reviewed all pertinent imaging results/findings within the past 24 hours.        Assessment & Plan  Acute on chronic systolic congestive heart failure  Patient has Systolic (HFrEF) heart failure that is Acute on chronic. On presentation their CHF was decompensated. Evidence of decompensated CHF on presentation includes: edema and weight gain. The etiology of their decompensation is likely increased fluid intake. Most recent BNP and echo results are listed below.  No results for input(s): "BNP" in the last 72 hours.    Latest ECHO  Results for orders placed during the hospital encounter of 11/26/24    Echo    Interpretation Summary    Left Ventricle: The left ventricle is normal in size. Normal wall thickness. There is concentric hypertrophy. Normal wall motion. Septal motion is consistent with bundle branch block. There is moderately reduced systolic function. Ejection fraction is approximately 35%. " Grade I diastolic dysfunction.    Right Ventricle: Normal right ventricular cavity size. Wall thickness is normal. Systolic function is normal.    IVC/SVC: Normal venous pressure at 3 mmHg.    Current Heart Failure Medications  metoprolol succinate (TOPROL-XL) 24 hr tablet 25 mg, Daily, Oral  hydrALAZINE injection 10 mg, Every 6 hours PRN, Intravenous    Plan  - Monitor strict I&Os and daily weights.    - Place on telemetry  - Low sodium diet  - Place on fluid restriction of 1.5 L.   - Cardiology has not been consulted    Will repeat echo  Continue Lasix    Bun/Cr rising- hold lasix  Give gentle hydration    Started on IVF    03/18/2025  Patient appears euvolemic   Continue gentle IVF  C. difficile colitis  Patient is still reports diarrhea   Continue isolation   Vancomycin p.o.    Cont vanc po    Now on Day 7/10 of oral vanc  Add Questran to control diarrhea    Diarrhea better  Acute gout of left elbow  Good response to Solumedrol and Colchicine- cont    Much better  Check ESR, CRP in am    Improving. Cont steroids  D/c colchicine  ANGELITO on CKD 4  Creatine stable for now. BMP reviewed- noted Estimated Creatinine Clearance: 40.6 mL/min (A) (based on SCr of 2.2 mg/dL (H)). according to latest data. Based on current GFR, CKD stage is stage 4 - GFR 15-29.  Monitor UOP and serial BMP and adjust therapy as needed. Renally dose meds. Avoid nephrotoxic medications and procedures.    Likely sec to lasix, Diarrhea- treat w IVF, hold Lasix    Etiology unclear for ANGELITO- started on IVF    03/18/2025  Continue gentle IVF   Creatinine improving  Deep vein thrombosis (DVT) of lower extremity  Continue Eliquis   Lower extremity ultrasound    Anemia, chronic disease  Stable   Continue to monitor  Chronic anticoagulation  Continue Eliquis    Type II diabetes mellitus with renal manifestations  Patient's FSGs are controlled on current medication regimen.  Last A1c reviewed-   Lab Results   Component Value Date    HGBA1C 5.7 (H)  12/17/2024     Most recent fingerstick glucose reviewed-   Recent Labs   Lab 03/17/25  1722 03/17/25  2050 03/18/25  0533 03/18/25  1126   POCTGLUCOSE 293* 254* 235* 256*     Current correctional scale  Low  Maintain anti-hyperglycemic dose as follows-   Antihyperglycemics (From admission, onward)      Start     Stop Route Frequency Ordered    03/16/25 0900  insulin glargine U-100 (Lantus) pen 25 Units         -- SubQ 2 times daily 03/16/25 0225    03/16/25 0411  insulin aspart U-100 pen 0-10 Units         -- SubQ Every 4 hours PRN 03/16/25 0312          Hold Oral hypoglycemics while patient is in the hospital.    BS high sec to steroids, which were lowered and lantus/SSI increased    BS still high, cont Lantus/SSI  Chronic immunosuppression with Prograf, MMF and prednisone  Continue Prograf and CellCept   Hold mycophenolate    On Solumedrol for acute gout at present    Lower the dose due to ANGELITO  Renal following  VTE Risk Mitigation (From admission, onward)           Ordered     apixaban tablet 5 mg  2 times daily         03/14/25 0236                    Discharge Planning   GRETEL:      Code Status: Full Code   Medical Readiness for Discharge Date:   Discharge Plan A: Home with family, Home Health              Long Payan MD  Department of Hospital Medicine   [facility]

## 2025-03-18 NOTE — SUBJECTIVE & OBJECTIVE
Interval History:  No acute issues reported overnight.    Review of Systems   Constitutional:  Positive for activity change and appetite change. Negative for fatigue and fever.   HENT:  Negative for sinus pressure.    Eyes:  Negative for visual disturbance.   Respiratory:  Negative for shortness of breath.    Cardiovascular:  Positive for leg swelling. Negative for chest pain.   Gastrointestinal:  Negative for nausea and vomiting.   Genitourinary:  Negative for difficulty urinating.   Musculoskeletal:  Positive for arthralgias and joint swelling. Negative for back pain.   Skin:  Negative for rash.   Neurological:  Positive for weakness. Negative for headaches.   Psychiatric/Behavioral:  Negative for confusion.      Objective:     Vital Signs (Most Recent):  Temp: 97.6 °F (36.4 °C) (03/18/25 0826)  Pulse: 75 (03/18/25 1051)  Resp: 18 (03/18/25 0826)  BP: 132/65 (03/18/25 0826)  SpO2: 99 % (03/18/25 0826) Vital Signs (24h Range):  Temp:  [97.4 °F (36.3 °C)-97.6 °F (36.4 °C)] 97.6 °F (36.4 °C)  Pulse:  [68-84] 75  Resp:  [18-20] 18  SpO2:  [97 %-99 %] 99 %  BP: (114-155)/(57-75) 132/65     Weight: 116.3 kg (256 lb 6.3 oz)  Body mass index is 34.77 kg/m².    Intake/Output Summary (Last 24 hours) at 3/18/2025 1133  Last data filed at 3/18/2025 0710  Gross per 24 hour   Intake 320 ml   Output 1650 ml   Net -1330 ml         Physical Exam  Vitals and nursing note reviewed.   Constitutional:       General: He is awake. He is not in acute distress.     Appearance: He is well-developed. He is obese. He is ill-appearing. He is not diaphoretic.   HENT:      Head: Normocephalic and atraumatic.      Right Ear: External ear normal.      Left Ear: External ear normal.      Nose: Nose normal.      Mouth/Throat:      Mouth: Mucous membranes are moist.      Pharynx: Oropharynx is clear.   Eyes:      General: No scleral icterus.        Right eye: No discharge.         Left eye: No discharge.      Extraocular Movements: Extraocular  movements intact.      Conjunctiva/sclera: Conjunctivae normal.      Pupils: Pupils are equal, round, and reactive to light.   Cardiovascular:      Rate and Rhythm: Normal rate and regular rhythm.      Pulses: Normal pulses.      Heart sounds: Normal heart sounds. No murmur heard.     No friction rub. No gallop.   Pulmonary:      Effort: Pulmonary effort is normal. No respiratory distress.      Breath sounds: Normal breath sounds. No stridor. No wheezing or rales.   Abdominal:      General: Bowel sounds are normal. There is no distension.      Palpations: Abdomen is soft. There is no mass.      Tenderness: There is no abdominal tenderness. There is no guarding.   Musculoskeletal:         General: Normal range of motion.      Cervical back: Normal range of motion and neck supple. No rigidity or tenderness.      Right lower leg: No edema.      Left lower leg: No edema.      Comments: Bilateral upper extremity swelling and tenderness   Lymphadenopathy:      Cervical: No cervical adenopathy.   Skin:     General: Skin is warm.      Capillary Refill: Capillary refill takes 2 to 3 seconds.      Findings: No erythema.   Neurological:      General: No focal deficit present.      Mental Status: He is alert and oriented to person, place, and time.      Motor: Weakness present.      Gait: Gait abnormal.   Psychiatric:         Mood and Affect: Mood normal.         Behavior: Behavior normal. Behavior is cooperative.               Significant Labs: All pertinent labs within the past 24 hours have been reviewed.    Significant Imaging: I have reviewed all pertinent imaging results/findings within the past 24 hours.

## 2025-03-18 NOTE — ASSESSMENT & PLAN NOTE
Creatine stable for now. BMP reviewed- noted Estimated Creatinine Clearance: 40.6 mL/min (A) (based on SCr of 2.2 mg/dL (H)). according to latest data. Based on current GFR, CKD stage is stage 4 - GFR 15-29.  Monitor UOP and serial BMP and adjust therapy as needed. Renally dose meds. Avoid nephrotoxic medications and procedures.    Likely sec to lasix, Diarrhea- treat w IVF, hold Lasix    Etiology unclear for ANGELITO- started on IVF    03/18/2025  Continue gentle IVF   Creatinine improving

## 2025-03-18 NOTE — PROGRESS NOTES
Nephrology Progress Note     History of Present Illness     69 year old male with a history of ESRD previously on HD and s/p LRDKT in 2015 with a baseline creatinine around 1.5.  He is followed outpatient by Dr. Martinez.      He was recently admitted to this hospital where he received treatment for UTI and cdiff.  He did have an ANGELITO during that admission with creatinine 4.3 and this was felt due to overdiuresis at his nursing home.  Entresto and diuretics were placed on hold. That hospital course was also complicated by gross hematuria which resolved.  Plans were for patient to be discharged back to [facility], however, due to financial reasons, was discharged home with HH on oral vanc which was to continue for 10 days per ID.  His cellcept was placed on hold. Prograf was continued on 4/3 and he was discharged with lasix as well. Crt on discharge was 1.6.    Present back to this hospital 3/13/25 with complaints of worsening edema.  He was continued on lasix and placed on low sodium diet. Gout was also suspected this admission and he was started on steroids and colchicine.  His creatinine was 1.8 on admission and cleo to 2.9 on 3/17 which prompted nephrology consultation. Patient was started on gently IVF per primary team and lasix placed on hold.       Interval History   Overnight/currently:  Patient seen and examined at bedside.  Denies any shortness of breath, chest pain, nausea, decreased appetite.  He reports he is making urine and states his edema has improved.         Allergies:    is allergic to lisinopril, actos  [pioglitazone], and metformin.    Current medications:   Scheduled Meds:   apixaban  5 mg Oral BID    aspirin  81 mg Oral Daily    calcitRIOL  0.25 mcg Oral Daily    cholestyramine  1 packet Oral BID    colchicine  0.6 mg Oral Daily    famotidine  20 mg Oral Daily    ferrous sulfate  1 tablet Oral Every other day    folic acid-vit B6-vit B12 2.5-25-2 mg  1 tablet Oral Daily    insulin glargine  U-100  25 Units Subcutaneous BID    metoprolol succinate  25 mg Oral Daily    predniSONE  10 mg Oral BID    sodium bicarbonate  1,300 mg Oral Once    tacrolimus  2 mg Oral Daily AM    And    tacrolimus  1.5 mg Oral Daily PM    vancomycin  125 mg Oral Q6H     Continuous Infusions:   0.9% NaCl   Intravenous Continuous 75 mL/hr at 03/18/25 0647 New Bag at 03/18/25 0647     PRN Meds:.  Current Facility-Administered Medications:     acetaminophen, 650 mg, Oral, Q6H PRN    dextrose 50%, 12.5 g, Intravenous, PRN    dextrose 50%, 25 g, Intravenous, PRN    glucagon (human recombinant), 1 mg, Intramuscular, PRN    glucose, 16 g, Oral, PRN    glucose, 24 g, Oral, PRN    hydrALAZINE, 10 mg, Intravenous, Q6H PRN    HYDROcodone-acetaminophen, 1 tablet, Oral, Q6H PRN    insulin aspart U-100, 0-10 Units, Subcutaneous, Q4H PRN    morphine, 4 mg, Intravenous, Q6H PRN    ondansetron, 4 mg, Oral, Q6H PRN     Physical Examination     VS/Measurements    /65 (BP Location: Left arm, Patient Position: Lying)   Pulse 75   Temp 97.6 °F (36.4 °C) (Oral)   Resp 18   Ht 6' (1.829 m)   Wt 116.3 kg (256 lb 6.3 oz)   SpO2 99%   BMI 34.77 kg/m²         General:  Moderately built, not in any distress.  Neck:  Supple, negative JVD  Respiratory: Non-labored,  Lungs are clear to auscultation.    Cardiovascular:  Normal rate, Regular rhythm.   Abdomen:  Soft, Non-tender, Normal bowel sounds.   Muskuloskeletal:  No pedal edema          Laboratory Results   Today's Lab Results :    Recent Results (from the past 24 hours)   POCT glucose    Collection Time: 03/17/25 12:07 PM   Result Value Ref Range    POCT Glucose 313 (H) 70 - 110 mg/dL   POCT glucose    Collection Time: 03/17/25  5:22 PM   Result Value Ref Range    POCT Glucose 293 (H) 70 - 110 mg/dL   POCT glucose    Collection Time: 03/17/25  8:50 PM   Result Value Ref Range    POCT Glucose 254 (H) 70 - 110 mg/dL   POCT glucose    Collection Time: 03/18/25  5:33 AM   Result Value Ref Range     POCT Glucose 235 (H) 70 - 110 mg/dL   Renal Function Panel    Collection Time: 03/18/25  6:54 AM   Result Value Ref Range    Glucose 259 (H) 70 - 110 mg/dL    Sodium 137 136 - 145 mmol/L    Potassium 4.2 3.5 - 5.1 mmol/L    Chloride 112 (H) 95 - 110 mmol/L    CO2 15 (L) 23 - 29 mmol/L    BUN 72 (H) 8 - 23 mg/dL    Calcium 7.9 (L) 8.7 - 10.5 mg/dL    Creatinine 2.2 (H) 0.5 - 1.4 mg/dL    Albumin 2.0 (L) 3.5 - 5.2 g/dL    Phosphorus 4.3 2.7 - 4.5 mg/dL    eGFR 31 (A) >60 mL/min/1.73 m^2    Anion Gap 10 8 - 16 mmol/L   PTH, Intact    Collection Time: 03/18/25  6:54 AM   Result Value Ref Range    PTH, Intact 257.2 (H) 9.0 - 77.0 pg/mL   Basic Metabolic Panel    Collection Time: 03/18/25  6:54 AM   Result Value Ref Range    Sodium 137 136 - 145 mmol/L    Potassium 4.2 3.5 - 5.1 mmol/L    Chloride 112 (H) 95 - 110 mmol/L    CO2 15 (L) 23 - 29 mmol/L    Glucose 259 (H) 70 - 110 mg/dL    BUN 72 (H) 8 - 23 mg/dL    Creatinine 2.2 (H) 0.5 - 1.4 mg/dL    Calcium 7.9 (L) 8.7 - 10.5 mg/dL    Anion Gap 10 8 - 16 mmol/L    eGFR 31 (A) >60 mL/min/1.73 m^2   CBC Auto Differential    Collection Time: 03/18/25  9:13 AM   Result Value Ref Range    WBC 3.05 (L) 3.90 - 12.70 K/uL    RBC 4.21 (L) 4.60 - 6.20 M/uL    Hemoglobin 10.2 (L) 14.0 - 18.0 g/dL    Hematocrit 34.9 (L) 40.0 - 54.0 %    MCV 83 82 - 98 fL    MCH 24.2 (L) 27.0 - 31.0 pg    MCHC 29.2 (L) 32.0 - 36.0 g/dL    RDW 15.2 (H) 11.5 - 14.5 %    Platelets 242 150 - 450 K/uL    MPV 10.7 9.2 - 12.9 fL    Immature Granulocytes CANCELED 0.0 - 0.5 %    Immature Grans (Abs) CANCELED 0.00 - 0.04 K/uL    Lymph # CANCELED 1.0 - 4.8 K/uL    Mono # CANCELED 0.3 - 1.0 K/uL    Eos # CANCELED 0.0 - 0.5 K/uL    Baso # CANCELED 0.00 - 0.20 K/uL    nRBC 1 (A) 0 /100 WBC    Gran % 74.0 (H) 38.0 - 73.0 %    Lymph % 18.0 18.0 - 48.0 %    Mono % 7.0 4.0 - 15.0 %    Eosinophil % 0.0 0.0 - 8.0 %    Basophil % 0.0 0.0 - 1.9 %    Bands 1.0 %    Platelet Estimate Appears normal     Aniso Slight      Stacy Slight     Ovalocytes Occasional     Acanthocytes Present     Differential Method Manual        LABS:  Reviewed Yes      Intake/Output Summary (Last 24 hours) at 3/18/2025 1105  Last data filed at 3/18/2025 0710  Gross per 24 hour   Intake 320 ml   Output 1650 ml   Net -1330 ml       Assessment and Plan     ANGELITO on CKD.  ANGELITO possibly IVVD in the setting of diuresis.  Creatinine is trending downward with gentle IVF.  Recent renal u/s and renal artery doppler were unremarkable.  Discussed with patient need for strict sodium restriction.  Continue strict I/o here and will eventually need to transition back to oral diuretics.      S/p LRDKT in 2015.  Follows outpatient with  Dr. Martinez.  Cellcept remains on hold.  Currently on tacro 2/1.5.  Prograf level 6.4 on 3/15.     Cdiff.  On oral vanc. Per primary team/ID.     Gout flare.  On oral steroids and colchicine.      Metabolic acidosis.  On bicarb supplements.      Hypertension.  Blood pressure is controlled on current therapy.     Type 2 DM.  Continue management per primary team.    Anemia.  Multifactorial.  On oral iron.  Transfuse PRN.  Hgb stable.     Secondary hyperparathyroidism.  On daily calcitriol.  Corrected calcium stable.     Systolic and diastolic CHF.  Echo 3/14/25 with EF 35-40%.  Sodium restriction.  Cautious use with IVF and will need to transition back to oral diuretics.     Hx of DVT.  Eliquis restarted last admission.             ________________________________________________  Carlota Roberson  03/18/2025

## 2025-03-18 NOTE — PLAN OF CARE
Pt is stable. No Sx of acute distress  Denies any pain   Cardiac monitor: [id number]  Contact precautions maintained d/t c-diff  Abx given as ordered  NS at 75mL/hr  Blood glucose monitored   Turned q.2    Chart orders reviewed. Bed in lowest position, wheels locked, call light within reach. Pt remains injury free. Will cont POC

## 2025-03-18 NOTE — ASSESSMENT & PLAN NOTE
Patient's FSGs are controlled on current medication regimen.  Last A1c reviewed-   Lab Results   Component Value Date    HGBA1C 5.7 (H) 12/17/2024     Most recent fingerstick glucose reviewed-   Recent Labs   Lab 03/17/25  1722 03/17/25  2050 03/18/25  0533 03/18/25  1126   POCTGLUCOSE 293* 254* 235* 256*     Current correctional scale  Low  Maintain anti-hyperglycemic dose as follows-   Antihyperglycemics (From admission, onward)      Start     Stop Route Frequency Ordered    03/16/25 0900  insulin glargine U-100 (Lantus) pen 25 Units         -- SubQ 2 times daily 03/16/25 0225    03/16/25 0411  insulin aspart U-100 pen 0-10 Units         -- SubQ Every 4 hours PRN 03/16/25 0312          Hold Oral hypoglycemics while patient is in the hospital.    BS high sec to steroids, which were lowered and lantus/SSI increased    BS still high, cont Lantus/SSI

## 2025-03-18 NOTE — PT/OT/SLP PROGRESS
"Occupational Therapy   Treatment    Name: Mitch Whittaker  MRN: [id number]  Admitting Diagnosis:  Acute on chronic systolic congestive heart failure       Recommendations:     Discharge Recommendations: Moderate Intensity Therapy  Discharge Equipment Recommendations:  to be determined by next level of care  Barriers to discharge:       Assessment:     Mitch Whittaker is a 69 y.o. male with a medical diagnosis of Acute on chronic systolic congestive heart failure. Performance deficits affecting function are weakness, gait instability, decreased upper extremity function, decreased lower extremity function, impaired balance, impaired endurance, impaired self care skills, impaired functional mobility.     Rehab Prognosis:  Good; patient would benefit from acute skilled OT services to address these deficits and reach maximum level of function.       Plan:     Patient to be seen 2 x/week to address the above listed problems via self-care/home management, therapeutic activities, therapeutic exercises  Plan of Care Expires: 03/31/25  Plan of Care Reviewed with: patient    Subjective     Chief Complaint: Diarrhea   Patient/Family Comments/goals: None stated  Pain/Comfort:  Pain Rating 1: 0/10  Pain Rating Post-Intervention 1: 0/10    Objective:     Communicated with: Chart review prior to session.  Patient found supine with telemetry upon OT entry to room.    General Precautions: Standard, fall, special contact    Orthopedic Precautions:N/A  Braces: N/A  Respiratory Status: Room air     Occupational Performance:       [facility] 6 Click ADL: 14    Treatment & Education:  Pt refused all OOB activity, reporting "I get diarrhea any time I move" and "I'm not getting out of bed today". Pt educated on importance of OOB activity on order to progress towards independence. Pt still adamantly refused.  Pt willing to perform supine therex's, 1x10 reps in all BUE planes to increase functional ROM and strength needed for daily activities. Pt " encouraged to request assistance from staff to sit EOB for all meals to prevent pneumonia and blood clots, as well as to increase mobility. Pt verbalized understanding.    Patient left supine with all lines intact, call button in reach, and bed alarm on    GOALS:   Multidisciplinary Problems       Occupational Therapy Goals          Problem: Occupational Therapy    Goal Priority Disciplines Outcome Interventions   Occupational Therapy Goal     OT, PT/OT Progressing    Description: O.T. GOALS TO BE MET BY 3-31-25  PT WILL TOLERATE 1 SET X15 REPS B UE ROM EXERCISE  MIN A WITH UE DRESSING  MOD A WITH BSC TRANSFER                           Time Tracking:     OT Date of Treatment: 03/18/25  OT Start Time: 0905  OT Stop Time: 0920  OT Total Time (min): 15 min    Billable Minutes:Therapeutic Exercise 15    JONNA Crespo  OT/ANNIA: OT     Number of ANNIA visits since last OT visit: 0    3/18/2025

## 2025-03-18 NOTE — ASSESSMENT & PLAN NOTE
"Patient has Systolic (HFrEF) heart failure that is Acute on chronic. On presentation their CHF was decompensated. Evidence of decompensated CHF on presentation includes: edema and weight gain. The etiology of their decompensation is likely increased fluid intake. Most recent BNP and echo results are listed below.  No results for input(s): "BNP" in the last 72 hours.    Latest ECHO  Results for orders placed during the hospital encounter of 11/26/24    Echo    Interpretation Summary    Left Ventricle: The left ventricle is normal in size. Normal wall thickness. There is concentric hypertrophy. Normal wall motion. Septal motion is consistent with bundle branch block. There is moderately reduced systolic function. Ejection fraction is approximately 35%. Grade I diastolic dysfunction.    Right Ventricle: Normal right ventricular cavity size. Wall thickness is normal. Systolic function is normal.    IVC/SVC: Normal venous pressure at 3 mmHg.    Current Heart Failure Medications  metoprolol succinate (TOPROL-XL) 24 hr tablet 25 mg, Daily, Oral  hydrALAZINE injection 10 mg, Every 6 hours PRN, Intravenous    Plan  - Monitor strict I&Os and daily weights.    - Place on telemetry  - Low sodium diet  - Place on fluid restriction of 1.5 L.   - Cardiology has not been consulted    Will repeat echo  Continue Lasix    Bun/Cr rising- hold lasix  Give gentle hydration    Started on IVF    03/18/2025  Patient appears euvolemic   Continue gentle IVF  "

## 2025-03-18 NOTE — PLAN OF CARE
Pt declined all OOB activity, reporting diarrhea with movement. Pt educate don importance of OOB activity, however patient adamantly declined.   Willing to complete supine there ex's with BUE's in all planes.     Continue POC. Recommend moderate intensity therapy at DC

## 2025-03-18 NOTE — PT/OT/SLP PROGRESS
"Physical Therapy  Treatment    Mitch Whittaker   MRN: [id number]   Admitting Diagnosis: Acute on chronic systolic congestive heart failure    PT Received On: 03/18/25  PT Start Time: 0910     PT Stop Time: 0925    PT Total Time (min): 15 min       Billable Minutes:  Therapeutic Exercise 15    Treatment Type: Treatment  PT/PTA: PT     Number of PTA visits since last PT visit: 0       General Precautions: Standard, fall, special contact  Orthopedic Precautions: N/A  Braces: N/A  Respiratory Status: Room air         Subjective:  Communicated with EPIC CHART REVIEW  prior to session.   PT AGREED TO BED TE ONLY. PT REPORTED, " WHEN I MOVE I POOP."     Pain/Comfort  Pain Rating 1: 0/10  Pain Rating Post-Intervention 1: 0/10    Objective:   Patient found with: peripheral IV, telemetry    Functional Mobility:  P.T. EDUCATED PT ON SET BACKS OF IMPROVEMENTS WITH MOBILITY AND STRENGTH WHEN PT REFUSES TX. PT THEN AGREED TO BED TE. P.T. ALSO EDCUATED PT COULD BE CLEANED UP IF HE DID SOIL HIMSELF. HOWEVER PT CONT TO REFUSED SITTING EOB / ALL OTHER MOBILITY.       Treatment and Education:  PT COMPLETED AP, HIP ADD/ABD , HS AND GLUT SETS X 15 REPS  WITH LIMITED ROM. PT LEFT SUP IN BED WITH ALL NEEDS MET.      AM-PAC 6 CLICK MOBILITY  How much help from another person does this patient currently need?   1 = Unable, Total/Dependent Assistance  2 = A lot, Maximum/Moderate Assistance  3 = A little, Minimum/Contact Guard/Supervision  4 = None, Modified [place]/Independent    Turning over in bed (including adjusting bedclothes, sheets and blankets)?: 1  Sitting down on and standing up from a chair with arms (e.g., wheelchair, bedside commode, etc.): 1  Moving from lying on back to sitting on the side of the bed?: 1  Moving to and from a bed to a chair (including a wheelchair)?: 1  Need to walk in hospital room?: 1  Climbing 3-5 steps with a railing?: 1  Basic Mobility Total Score: 6    AM-PAC Raw Score CMS G-Code Modifier Level of " Impairment Assistance   6 % Total / Unable   7 - 9 CM 80 - 100% Maximal Assist   10 - 14 CL 60 - 80% Moderate Assist   15 - 19 CK 40 - 60% Moderate Assist   20 - 22 CJ 20 - 40% Minimal Assist   23 CI 1-20% SBA / CGA   24 CH 0% Independent/ Mod I     Patient left supine with call button in reach and bed alarm on.    Assessment:  PT RADHA MIN TX. PT SELF LIMITING WITH GROSS FUNC MOBILITY     Rehab identified problem list/impairments: weakness, impaired endurance, impaired self care skills, impaired functional mobility, gait instability, impaired balance, decreased safety awareness, decreased lower extremity function, decreased upper extremity function, decreased coordination, decreased ROM    Rehab potential is fair.    Activity tolerance: Poor    Discharge recommendations: Moderate Intensity Therapy (> TO NH)      Barriers to discharge:      Equipment recommendations: to be determined by next level of care     GOALS:   Multidisciplinary Problems       Physical Therapy Goals          Problem: Physical Therapy    Goal Priority Disciplines Outcome Interventions   Physical Therapy Goal     PT, PT/OT     Description: LTG'S TO BE MET IN 14 DAYS (3-30-25)  PT WILL REQUIRE MODA FOR BED MOBILITY  PT WILL REQUIRE MODA FOR SIT<>STAND TF WITH RW  PT WILL REQUIRE MODA FOR BED<>CHAIR TF WITH RW  PT WILL INC AMPAC SCORE BY 2 POINTS TO PROGRESS GROSS FUNC MOBILITY                         PLAN:    Patient to be seen 3 x/week to address the above listed problems via therapeutic activities, therapeutic exercises  Plan of Care expires: 03/30/25  Plan of Care reviewed with: patient         03/18/2025

## 2025-03-19 LAB
ALBUMIN SERPL BCP-MCNC: 2.1 G/DL (ref 3.5–5.2)
ANION GAP SERPL CALC-SCNC: 5 MMOL/L (ref 8–16)
BUN SERPL-MCNC: 60 MG/DL (ref 8–23)
CALCIUM SERPL-MCNC: 8.2 MG/DL (ref 8.7–10.5)
CHLORIDE SERPL-SCNC: 118 MMOL/L (ref 95–110)
CO2 SERPL-SCNC: 18 MMOL/L (ref 23–29)
CREAT SERPL-MCNC: 1.8 MG/DL (ref 0.5–1.4)
EST. GFR  (NO RACE VARIABLE): 40 ML/MIN/1.73 M^2
GLUCOSE SERPL-MCNC: 172 MG/DL (ref 70–110)
PHOSPHATE SERPL-MCNC: 3.4 MG/DL (ref 2.7–4.5)
POCT GLUCOSE: 168 MG/DL (ref 70–110)
POCT GLUCOSE: 213 MG/DL (ref 70–110)
POCT GLUCOSE: 248 MG/DL (ref 70–110)
POTASSIUM SERPL-SCNC: 4.2 MMOL/L (ref 3.5–5.1)
SODIUM SERPL-SCNC: 141 MMOL/L (ref 136–145)
TACROLIMUS BLD-MCNC: 4 NG/ML (ref 5–15)

## 2025-03-19 PROCEDURE — 99232 SBSQ HOSP IP/OBS MODERATE 35: CPT | Mod: ,,, | Performed by: INTERNAL MEDICINE

## 2025-03-19 PROCEDURE — 25000003 PHARM REV CODE 250: Performed by: EMERGENCY MEDICINE

## 2025-03-19 PROCEDURE — 25000003 PHARM REV CODE 250: Performed by: FAMILY MEDICINE

## 2025-03-19 PROCEDURE — 63600175 PHARM REV CODE 636 W HCPCS: Performed by: INTERNAL MEDICINE

## 2025-03-19 PROCEDURE — 36415 COLL VENOUS BLD VENIPUNCTURE: CPT | Performed by: INTERNAL MEDICINE

## 2025-03-19 PROCEDURE — 27000207 HC ISOLATION

## 2025-03-19 PROCEDURE — 21400001 HC TELEMETRY ROOM

## 2025-03-19 PROCEDURE — 63600175 PHARM REV CODE 636 W HCPCS: Performed by: EMERGENCY MEDICINE

## 2025-03-19 PROCEDURE — 80069 RENAL FUNCTION PANEL: CPT | Performed by: INTERNAL MEDICINE

## 2025-03-19 RX ORDER — TACROLIMUS 1 MG/1
3 CAPSULE ORAL EVERY EVENING
Status: DISCONTINUED | OUTPATIENT
Start: 2025-03-19 | End: 2025-03-20 | Stop reason: HOSPADM

## 2025-03-19 RX ORDER — TACROLIMUS 1 MG/1
2 CAPSULE ORAL EVERY MORNING
Status: DISCONTINUED | OUTPATIENT
Start: 2025-03-20 | End: 2025-03-20 | Stop reason: HOSPADM

## 2025-03-19 RX ORDER — MYCOPHENOLATE MOFETIL 250 MG/1
500 CAPSULE ORAL 2 TIMES DAILY
Status: DISCONTINUED | OUTPATIENT
Start: 2025-03-20 | End: 2025-03-20 | Stop reason: HOSPADM

## 2025-03-19 RX ADMIN — INSULIN GLARGINE 25 UNITS: 100 INJECTION, SOLUTION SUBCUTANEOUS at 08:03

## 2025-03-19 RX ADMIN — FAMOTIDINE 20 MG: 20 TABLET, FILM COATED ORAL at 08:03

## 2025-03-19 RX ADMIN — Medication 1 TABLET: at 08:03

## 2025-03-19 RX ADMIN — VANCOMYCIN HYDROCHLORIDE 125 MG: KIT at 12:03

## 2025-03-19 RX ADMIN — COLCHICINE 0.6 MG: 0.6 TABLET ORAL at 08:03

## 2025-03-19 RX ADMIN — INSULIN ASPART 2 UNITS: 100 INJECTION, SOLUTION INTRAVENOUS; SUBCUTANEOUS at 05:03

## 2025-03-19 RX ADMIN — APIXABAN 5 MG: 2.5 TABLET, FILM COATED ORAL at 08:03

## 2025-03-19 RX ADMIN — PREDNISONE 10 MG: 10 TABLET ORAL at 08:03

## 2025-03-19 RX ADMIN — CALCITRIOL CAPSULES 0.25 MCG 0.25 MCG: 0.25 CAPSULE ORAL at 08:03

## 2025-03-19 RX ADMIN — ASPIRIN 81 MG: 81 TABLET, COATED ORAL at 08:03

## 2025-03-19 RX ADMIN — INSULIN ASPART 4 UNITS: 100 INJECTION, SOLUTION INTRAVENOUS; SUBCUTANEOUS at 10:03

## 2025-03-19 RX ADMIN — CHOLESTYRAMINE 4 G: 4 POWDER, FOR SUSPENSION ORAL at 08:03

## 2025-03-19 RX ADMIN — METOPROLOL SUCCINATE 25 MG: 25 TABLET, EXTENDED RELEASE ORAL at 08:03

## 2025-03-19 RX ADMIN — VANCOMYCIN HYDROCHLORIDE 125 MG: KIT at 05:03

## 2025-03-19 RX ADMIN — TACROLIMUS 3 MG: 1 CAPSULE ORAL at 06:03

## 2025-03-19 RX ADMIN — VANCOMYCIN HYDROCHLORIDE 125 MG: KIT at 06:03

## 2025-03-19 RX ADMIN — HYDROCODONE BITARTRATE AND ACETAMINOPHEN 1 TABLET: 7.5; 325 TABLET ORAL at 11:03

## 2025-03-19 RX ADMIN — VANCOMYCIN HYDROCHLORIDE 125 MG: KIT at 11:03

## 2025-03-19 RX ADMIN — TACROLIMUS 2 MG: 1 CAPSULE ORAL at 08:03

## 2025-03-19 NOTE — SUBJECTIVE & OBJECTIVE
Interval History:  No acute issues reported overnight.    Review of Systems   Constitutional:  Positive for activity change and appetite change. Negative for fatigue and fever.   HENT:  Negative for sinus pressure.    Eyes:  Negative for visual disturbance.   Respiratory:  Negative for shortness of breath.    Cardiovascular:  Positive for leg swelling. Negative for chest pain.   Gastrointestinal:  Negative for nausea and vomiting.   Genitourinary:  Negative for difficulty urinating.   Musculoskeletal:  Positive for arthralgias and joint swelling. Negative for back pain.   Skin:  Negative for rash.   Neurological:  Positive for weakness. Negative for headaches.   Psychiatric/Behavioral:  Negative for confusion.      Objective:     Vital Signs (Most Recent):  Temp: 97.3 °F (36.3 °C) (03/19/25 1213)  Pulse: 87 (03/19/25 1326)  Resp: 18 (03/19/25 1213)  BP: 134/61 (03/19/25 1213)  SpO2: 99 % (03/19/25 1213) Vital Signs (24h Range):  Temp:  [97.3 °F (36.3 °C)-98.4 °F (36.9 °C)] 97.3 °F (36.3 °C)  Pulse:  [] 87  Resp:  [16-20] 18  SpO2:  [98 %-100 %] 99 %  BP: (128-169)/(61-80) 134/61     Weight: 116.3 kg (256 lb 6.3 oz)  Body mass index is 34.77 kg/m².    Intake/Output Summary (Last 24 hours) at 3/19/2025 1336  Last data filed at 3/19/2025 1211  Gross per 24 hour   Intake 120 ml   Output 1600 ml   Net -1480 ml         Physical Exam  Vitals and nursing note reviewed.   Constitutional:       General: He is awake. He is not in acute distress.     Appearance: He is well-developed. He is obese. He is ill-appearing. He is not diaphoretic.   HENT:      Head: Normocephalic and atraumatic.      Right Ear: External ear normal.      Left Ear: External ear normal.      Nose: Nose normal.      Mouth/Throat:      Mouth: Mucous membranes are moist.      Pharynx: Oropharynx is clear.   Eyes:      General: No scleral icterus.        Right eye: No discharge.         Left eye: No discharge.      Extraocular Movements: Extraocular  movements intact.      Conjunctiva/sclera: Conjunctivae normal.      Pupils: Pupils are equal, round, and reactive to light.   Cardiovascular:      Rate and Rhythm: Normal rate and regular rhythm.      Pulses: Normal pulses.      Heart sounds: Normal heart sounds. No murmur heard.     No friction rub. No gallop.   Pulmonary:      Effort: Pulmonary effort is normal. No respiratory distress.      Breath sounds: Normal breath sounds. No stridor. No wheezing or rales.   Abdominal:      General: Bowel sounds are normal. There is no distension.      Palpations: Abdomen is soft. There is no mass.      Tenderness: There is no abdominal tenderness. There is no guarding.   Musculoskeletal:         General: Normal range of motion.      Cervical back: Normal range of motion and neck supple. No rigidity or tenderness.      Right lower leg: No edema.      Left lower leg: No edema.      Comments: Bilateral upper extremity swelling and tenderness   Lymphadenopathy:      Cervical: No cervical adenopathy.   Skin:     General: Skin is warm.      Capillary Refill: Capillary refill takes 2 to 3 seconds.      Findings: No erythema.   Neurological:      General: No focal deficit present.      Mental Status: He is alert and oriented to person, place, and time.      Motor: Weakness present.      Gait: Gait abnormal.   Psychiatric:         Mood and Affect: Mood normal.         Behavior: Behavior normal. Behavior is cooperative.               Significant Labs: All pertinent labs within the past 24 hours have been reviewed.    Significant Imaging: I have reviewed all pertinent imaging results/findings within the past 24 hours.

## 2025-03-19 NOTE — PROGRESS NOTES
[facility]  Progress Note    Patient Name: Mitch Whittaker  MRN: [id number]  Patient Class: IP- Inpatient   Admission Date: 3/13/2025  Length of Stay: 5 days  Attending Physician: Long Payan MD  Primary Care Provider: Valery Caal MD        Subjective     Principal Problem:Acute on chronic systolic congestive heart failure        HPI:  Patient is a 69-year-old  male with PMH of CHF, kidney transplant, CAD, DM, DVT who presents to the ED for complaints of pain and swelling to bilateral arms.  Of note patient was recently discharged on 03/12/2025 after being treated for ANGELITO and UTI.  Patient developed gross hematuria during stay and Eliquis was held.  Hematuria resolved and Eliquis was resumed prior to DC.  Patient tested positive for C diff and oral vancomycin was initiated.  Plan initially was for discharge back to [facility] however patient was unable to provide financials and was discharged home with home health.  Patient states since being discharged he has developed bilateral edema to his arms.  Tenderness also reported.  Denies any fever.  Patient still complains of diarrhea.    In the ED, H&H 9/29.6, BUN/CR 31/1.8, , D-dimer 3.5.  Chest x-ray showed mild vascular congestion.  Lasix 40 mg initiated.    Overview/Hospital Course:  69-year-old AAM with Hx of CHF, kidney transplant, CAD, DM, DVT who presented to the ED for pain and swelling to bilateral arms. Pt was just discharged on 03/12/2025 after being treated for ANGELITO and UTI and C diff colitis with oral Vanc. Pt developed gross hematuria during stay and Eliquis was held. Hematuria resolved and Eliquis was resumed prior to DC.  Patient tested positive for C diff and oral vancomycin was initiated.  Plan initially was to discharge back to [facility] however patient was unable to provide financials and was discharged home with home health.  Patient states since being discharged he has developed bilateral  edema to his arms.  Tenderness also reported.  Denies any fever.  Patient still complains of diarrhea.     In the ED, H&H 9/29.6, BUN/CR 31/1.8, , D-dimer 3.5.  Chest x-ray showed mild vascular congestion.  Lasix 40 mg initiated.      Also suspect mild gout- will give steroids and oral colchicine. Cont oral vanc for C diff.    3/15- looks and feels better, good response to steroids, BUE pain and swelling improving, able to lift his arms now. Cont present care. Will start PT/OT and start inj b12 plus triple vitamins, d/c home soon.     3/16- looks and feels better, more alert dn responsive, eating with both his hands easily, swelling, pain much better. Bun/Cr still rising, likely from diarrhea from C Diff- will add Questran and start IVF. Also BS was very high last evening sec to Prednisone for his gout- BS better now- will lower Prednisone and cont Lantus plus SSI.    3/17- Appreciate Dr. Mandujano- pt doing well, moving arms well, no diarrhea, eating drinking well. But Cr cleo to 2.9- hence renal consulted and started on IVF- no obvious etiology for the ANGELITO/Cr 2.9. also have halved the doses of the Tacrolimus. Renal following. Also d/w family, they are refusing SNF and will take care of him at home. HH will be ordered.  ESR/CRP very high- likely from his C Diff and Gout.     03/18/2025  Creatinine trending down with gentle IVF.  Continue current management.  Nephrology on case.  Bicarb supplementation.  03/19/2025  Will DC bicarb drip.  Patient appears clinically improving.  Continue current management.  Possible DC tomorrow.    Interval History:  No acute issues reported overnight.    Review of Systems   Constitutional:  Positive for activity change and appetite change. Negative for fatigue and fever.   HENT:  Negative for sinus pressure.    Eyes:  Negative for visual disturbance.   Respiratory:  Negative for shortness of breath.    Cardiovascular:  Positive for leg swelling. Negative for chest pain.    Gastrointestinal:  Negative for nausea and vomiting.   Genitourinary:  Negative for difficulty urinating.   Musculoskeletal:  Positive for arthralgias and joint swelling. Negative for back pain.   Skin:  Negative for rash.   Neurological:  Positive for weakness. Negative for headaches.   Psychiatric/Behavioral:  Negative for confusion.      Objective:     Vital Signs (Most Recent):  Temp: 97.3 °F (36.3 °C) (03/19/25 1213)  Pulse: 87 (03/19/25 1326)  Resp: 18 (03/19/25 1213)  BP: 134/61 (03/19/25 1213)  SpO2: 99 % (03/19/25 1213) Vital Signs (24h Range):  Temp:  [97.3 °F (36.3 °C)-98.4 °F (36.9 °C)] 97.3 °F (36.3 °C)  Pulse:  [] 87  Resp:  [16-20] 18  SpO2:  [98 %-100 %] 99 %  BP: (128-169)/(61-80) 134/61     Weight: 116.3 kg (256 lb 6.3 oz)  Body mass index is 34.77 kg/m².    Intake/Output Summary (Last 24 hours) at 3/19/2025 1336  Last data filed at 3/19/2025 1211  Gross per 24 hour   Intake 120 ml   Output 1600 ml   Net -1480 ml         Physical Exam  Vitals and nursing note reviewed.   Constitutional:       General: He is awake. He is not in acute distress.     Appearance: He is well-developed. He is obese. He is ill-appearing. He is not diaphoretic.   HENT:      Head: Normocephalic and atraumatic.      Right Ear: External ear normal.      Left Ear: External ear normal.      Nose: Nose normal.      Mouth/Throat:      Mouth: Mucous membranes are moist.      Pharynx: Oropharynx is clear.   Eyes:      General: No scleral icterus.        Right eye: No discharge.         Left eye: No discharge.      Extraocular Movements: Extraocular movements intact.      Conjunctiva/sclera: Conjunctivae normal.      Pupils: Pupils are equal, round, and reactive to light.   Cardiovascular:      Rate and Rhythm: Normal rate and regular rhythm.      Pulses: Normal pulses.      Heart sounds: Normal heart sounds. No murmur heard.     No friction rub. No gallop.   Pulmonary:      Effort: Pulmonary effort is normal. No respiratory  "distress.      Breath sounds: Normal breath sounds. No stridor. No wheezing or rales.   Abdominal:      General: Bowel sounds are normal. There is no distension.      Palpations: Abdomen is soft. There is no mass.      Tenderness: There is no abdominal tenderness. There is no guarding.   Musculoskeletal:         General: Normal range of motion.      Cervical back: Normal range of motion and neck supple. No rigidity or tenderness.      Right lower leg: No edema.      Left lower leg: No edema.      Comments: Bilateral upper extremity swelling and tenderness   Lymphadenopathy:      Cervical: No cervical adenopathy.   Skin:     General: Skin is warm.      Capillary Refill: Capillary refill takes 2 to 3 seconds.      Findings: No erythema.   Neurological:      General: No focal deficit present.      Mental Status: He is alert and oriented to person, place, and time.      Motor: Weakness present.      Gait: Gait abnormal.   Psychiatric:         Mood and Affect: Mood normal.         Behavior: Behavior normal. Behavior is cooperative.               Significant Labs: All pertinent labs within the past 24 hours have been reviewed.    Significant Imaging: I have reviewed all pertinent imaging results/findings within the past 24 hours.      Assessment & Plan  Acute on chronic systolic congestive heart failure  Patient has Systolic (HFrEF) heart failure that is Acute on chronic. On presentation their CHF was decompensated. Evidence of decompensated CHF on presentation includes: edema and weight gain. The etiology of their decompensation is likely increased fluid intake. Most recent BNP and echo results are listed below.  No results for input(s): "BNP" in the last 72 hours.    Latest ECHO  Results for orders placed during the hospital encounter of 11/26/24    Echo    Interpretation Summary    Left Ventricle: The left ventricle is normal in size. Normal wall thickness. There is concentric hypertrophy. Normal wall motion. Septal " motion is consistent with bundle branch block. There is moderately reduced systolic function. Ejection fraction is approximately 35%. Grade I diastolic dysfunction.    Right Ventricle: Normal right ventricular cavity size. Wall thickness is normal. Systolic function is normal.    IVC/SVC: Normal venous pressure at 3 mmHg.    Current Heart Failure Medications  metoprolol succinate (TOPROL-XL) 24 hr tablet 25 mg, Daily, Oral  hydrALAZINE injection 10 mg, Every 6 hours PRN, Intravenous    Plan  - Monitor strict I&Os and daily weights.    - Place on telemetry  - Low sodium diet  - Place on fluid restriction of 1.5 L.   - Cardiology has not been consulted    Will repeat echo  Continue Lasix    Bun/Cr rising- hold lasix  Give gentle hydration    Started on IVF    03/19/2025  Patient appears euvolemic   Will DC IVF   Patient clinically improving  C. difficile colitis  Continue p.o. vancomycin  Diarrhea improving  Acute gout of left elbow  Good response to Solumedrol and Colchicine- cont    Much better  Check ESR, CRP in am    Improving. Cont steroids  D/c colchicine  ANGELITO on CKD 4  Creatine stable for now. BMP reviewed- noted Estimated Creatinine Clearance: 49.6 mL/min (A) (based on SCr of 1.8 mg/dL (H)). according to latest data. Based on current GFR, CKD stage is stage 4 - GFR 15-29.  Monitor UOP and serial BMP and adjust therapy as needed. Renally dose meds. Avoid nephrotoxic medications and procedures.    Likely sec to lasix, Diarrhea- treat w IVF, hold Lasix    Etiology unclear for ANGELITO- started on IVF    03/19/2025  Creatinine improved   Will DC IVF   Continue to monitor  Deep vein thrombosis (DVT) of lower extremity  Continue Eliquis   Lower extremity ultrasound    Anemia, chronic disease  Stable   Continue to monitor  Chronic anticoagulation  Continue Eliquis    Type II diabetes mellitus with renal manifestations  Patient's FSGs are controlled on current medication regimen.  Last A1c reviewed-   Lab Results    Component Value Date    HGBA1C 5.7 (H) 12/17/2024     Most recent fingerstick glucose reviewed-   Recent Labs   Lab 03/18/25  1713 03/18/25  2215 03/19/25  0528 03/19/25  1053   POCTGLUCOSE 250* 245* 168* 213*     Current correctional scale  Low  Maintain anti-hyperglycemic dose as follows-   Antihyperglycemics (From admission, onward)      Start     Stop Route Frequency Ordered    03/16/25 0900  insulin glargine U-100 (Lantus) pen 25 Units         -- SubQ 2 times daily 03/16/25 0225    03/16/25 0411  insulin aspart U-100 pen 0-10 Units         -- SubQ Every 4 hours PRN 03/16/25 0312          Hold Oral hypoglycemics while patient is in the hospital.    BS high sec to steroids, which were lowered and lantus/SSI increased    BS still high, cont Lantus/SSI  Chronic immunosuppression with Prograf, MMF and prednisone  Continue Prograf and CellCept   Hold mycophenolate    On Solumedrol for acute gout at present    Lower the dose due to ANGELITO  Renal following  VTE Risk Mitigation (From admission, onward)           Ordered     apixaban tablet 5 mg  2 times daily         03/14/25 0236                    Discharge Planning   GRETEL: 3/19/2025     Code Status: Full Code   Medical Readiness for Discharge Date:   Discharge Plan A: Home with family, Home Health                Long Payan MD  Department of Hospital Medicine   [facility] - Med Surg

## 2025-03-19 NOTE — ASSESSMENT & PLAN NOTE
Patient's FSGs are controlled on current medication regimen.  Last A1c reviewed-   Lab Results   Component Value Date    HGBA1C 5.7 (H) 12/17/2024     Most recent fingerstick glucose reviewed-   Recent Labs   Lab 03/18/25  1713 03/18/25  2215 03/19/25  0528 03/19/25  1053   POCTGLUCOSE 250* 245* 168* 213*     Current correctional scale  Low  Maintain anti-hyperglycemic dose as follows-   Antihyperglycemics (From admission, onward)      Start     Stop Route Frequency Ordered    03/16/25 0900  insulin glargine U-100 (Lantus) pen 25 Units         -- SubQ 2 times daily 03/16/25 0225    03/16/25 0411  insulin aspart U-100 pen 0-10 Units         -- SubQ Every 4 hours PRN 03/16/25 0312          Hold Oral hypoglycemics while patient is in the hospital.    BS high sec to steroids, which were lowered and lantus/SSI increased    BS still high, cont Lantus/SSI

## 2025-03-19 NOTE — PLAN OF CARE
Discussed poc with pt, pt verbalized understanding    Purposeful rounding every 2hours    VS wnl  Cardiac monitoring in use, pt is NSR, tele monitor # [id number]  Blood glucose monitoring   Fall precautions in place, remains injury free  Pt denies c/o pain  Abx given as prescribed  Bed locked at lowest position  Call light within reach    Chart check complete  Will cont with POC

## 2025-03-19 NOTE — PROGRESS NOTES
Nephrology Progress Note     History of Present Illness     69-year-old gentleman/nursing outpatient with history of ESRD.  Underwent living related donor kidney transplantation 10 years ago.  Baseline creatinine was 1.5 .  Followed by Dr. Gonsalez of [facility].  History of CMV viremia with titer positive as recently as April of 2023 per outpatient notes from Dr. Gonsalez.  Patient was having some issues with GI symptoms and it was concern for CMV colitis but endoscopy failed to improve evidence of such.  It is felt that his heart failure and use of diuretics leads to prerenal azotemia and variable creatinine values at times.     Recent history significant for UTI in February and Clostridium difficile infection complicated by some ANGELITO with creatinine rising to 4.3.  The latter was apparently partly due to concerns for possible over-diuresis at his nursing home.  At that time offending medications held.  Creatinine improved and was back to baseline of 1.5 in early March and actually as low as 1.3 shortly thereafter.  Of note, when discharged and being treated for his Clostridium difficile infection he was told to hold his CellCept and not resume that until March 20th.  Of note, in January he had arthrocentesis in setting of knee pain revealing urate crystals and was treated for gout.  That was a new diagnosis.     Patient admitted here on March 13th with complaints of edema.  Echocardiogram with LVEF 35-40% and grade 1 diastolic dysfunction.  On admission creatinine had been 1.8.  Received diuretics with expected rise in creatinine.  Diuretics held and IV fluids started now creatinine improving.  Has had issues suspected gout this hospital stay and has been treated with steroids and colchicine.  Multiple other medical problems including deep venous thrombosis.  On apixaban.     Immunosuppression being achieved with tacrolimus and prednisone currently.  Typically takes mycophenolate as well but that is on hold  with recent C diff infection and diarrhea.      Prior notes from Dr. Gonsalez indicates that hypotension has prevented more aggressive diuresis in the past.    Interval History     Overnight/currently:  Afebrile.  Vital signs stable except for 2 episodes of significant tachycardia today.  Early this morning at 3:00 a.m. heart rate reported as 150.  At 922 reported as 132.  1.8 L of urine output yesterday.  Only 2 stools reported.  Creatinine down and nearing baseline range.  Acidosis improving.  Corrected calcium acceptable.  Patient denies chest pain or shortness of breath.  No nausea or vomiting.     Health Status   Allergies:    is allergic to lisinopril, actos  [pioglitazone], and metformin.    Current medications:   Current Medications[1]     Physical Examination   VS/Measurements   /61 (BP Location: Left arm, Patient Position: Lying)   Pulse 87   Temp 97.3 °F (36.3 °C) (Oral)   Resp 18   Ht 6' (1.829 m)   Wt 116.3 kg (256 lb 6.3 oz)   SpO2 99%   BMI 34.77 kg/m²      General:  Alert and oriented X3, No acute distress.         Nutritional status: Obese.    Neck:  Supple, No lymphadenopathy.     Respiratory:  Lungs are clear to auscultation, Respirations are non-labored, Symmetrical chest wall expansion.    Cardiovascular:  Normal rate, Regular rhythm.   Gastrointestinal:  Soft, Non-tender, Normal bowel sounds.   Integumentary:  Warm, Dry.   Psychiatric:  Cooperative, Appropriate mood & affect.        Review / Management   Laboratory Results   Today's Lab Results :    Recent Results (from the past 24 hours)   POCT glucose    Collection Time: 03/18/25  5:13 PM   Result Value Ref Range    POCT Glucose 250 (H) 70 - 110 mg/dL   POCT glucose    Collection Time: 03/18/25 10:15 PM   Result Value Ref Range    POCT Glucose 245 (H) 70 - 110 mg/dL   POCT glucose    Collection Time: 03/19/25  5:28 AM   Result Value Ref Range    POCT Glucose 168 (H) 70 - 110 mg/dL   Renal Function Panel    Collection Time: 03/19/25   6:24 AM   Result Value Ref Range    Glucose 172 (H) 70 - 110 mg/dL    Sodium 141 136 - 145 mmol/L    Potassium 4.2 3.5 - 5.1 mmol/L    Chloride 118 (H) 95 - 110 mmol/L    CO2 18 (L) 23 - 29 mmol/L    BUN 60 (H) 8 - 23 mg/dL    Calcium 8.2 (L) 8.7 - 10.5 mg/dL    Creatinine 1.8 (H) 0.5 - 1.4 mg/dL    Albumin 2.1 (L) 3.5 - 5.2 g/dL    Phosphorus 3.4 2.7 - 4.5 mg/dL    eGFR 40 (A) >60 mL/min/1.73 m^2    Anion Gap 5 (L) 8 - 16 mmol/L   POCT glucose    Collection Time: 03/19/25 10:53 AM   Result Value Ref Range    POCT Glucose 213 (H) 70 - 110 mg/dL        Impression and Plan   Diagnosis     Combined heart failure exacerbation.  LVEF 30-40%.  Diuresed aggressively earlier in hospital stay leading to worsening creatinine.  Diuretics held and placed on fluids.  Those have now been stopped.  Currently euvolemic.  We will likely need re-initiation of diuretics as needed but really needs heart failure education to weigh himself daily and use sliding scale diuretics and follow low-sodium diet.  The patient himself seems to have little insight.  Daughter at bedside and helps him with medications.  I think she needs to be involved.    Acute kidney injury superimposed upon CKD.  ANGELITO due to volume depletion with diuretics.  Improving.  Nearing baseline.      CKD stage 3/chronic allograft nephropathy with baseline creatinine running 1.3-1.5.      Status post living related donor kidney transplantation in 2015.  Follows with Dr. Gonsalez.  On tacrolimus and on prednisone.  Current dosing of tacrolimus is 2/1.5 Tacrolimus level checked again yesterday.  Level was 4.0 (low) and drawn at 6:54 a.m. Tacrolimus has been dosed at 1815 the night before (a 3 mg dose).  Subsequently tacro doses lower to 2/1.5 by hospital medicine.  Almost certainly has level be lower now.  Unclear why dosing was changed.  Level had been 6.4 on March 15th which is not bad.  That level was drawn at 5:56 a.m. and followed outpatient dosing.  Some authorities  would recommend this patient should have tacrolimus level running 4-6 but some more recently trying physicians would recommend 6-9 as targeted range given higher levels (albeit somewhat nephrotoxic) decreased risks for donor specific antibody formation and late chronic antibody-mediated rejection.  We will resume dosing to 4/3.  We will resume mycophenolate in the morning.    Hypertension and CKD.  Adequate control acutely.    Multifactorial anemia.  On oral iron.  Follow.    CKD-MBD/secondary hyperparathyroidism of renal origin.  On daily calcitriol.  Corrected calcium acceptable.    Recent C diff infection.  Remains on oral vancomycin.    Gout flare.  Status post steroids and colchicine.  Stop colchicine and assess uric acid level in a.m..  May need xanthine oxidase inhibitor which is a more appropriate therapy than chronic colchicine use.    Metabolic acidosis in setting of diarrhea and renal failure.  Bicarbonate improving.  Currently not getting any supplements.  Follow in a.m. and add oral supplements if needed.    History of deep venous thrombosis.  Apixaban has been restarted.    Type 2 diabetes.  On scheduled insulin.  Defer care of this problem to primary team.                ______________________________________________   Grace Schaefer MD    This document was created using voice recognition software.  It is possible that there are errors which have persisted after original proofreading.  If there is a question regarding contents of this document please contact me for clarification.         [1]   Current Facility-Administered Medications:     acetaminophen tablet 650 mg, 650 mg, Oral, Q6H PRN, Long Payan MD    apixaban tablet 5 mg, 5 mg, Oral, BID, Long Payan MD, 5 mg at 03/19/25 0846    aspirin EC tablet 81 mg, 81 mg, Oral, Daily, Long Payan MD, 81 mg at 03/19/25 0846    calcitRIOL capsule 0.25 mcg, 0.25 mcg, Oral, Daily, Long Payan MD, 0.25 mcg at 03/19/25 0846    cholestyramine 4 gram packet 4 g, 1 packet,  Oral, BID, Jaymie Medley MD, 4 g at 03/19/25 0846    colchicine capsule/tablet 0.6 mg, 0.6 mg, Oral, Daily, Jaymie Medley MD, 0.6 mg at 03/19/25 0845    dextrose 50% injection 12.5 g, 12.5 g, Intravenous, PRN, Long Payan MD    dextrose 50% injection 25 g, 25 g, Intravenous, PRN, Long Payan MD    famotidine tablet 20 mg, 20 mg, Oral, Daily, Long Payan MD, 20 mg at 03/19/25 0846    ferrous sulfate tablet 1 each, 1 tablet, Oral, Every other day, Long Payan MD, 1 each at 03/18/25 0907    folic acid-vit B6-vit B12 2.5-25-2 mg tablet 1 tablet, 1 tablet, Oral, Daily, Jaymie Medley MD, 1 tablet at 03/19/25 0846    glucagon (human recombinant) injection 1 mg, 1 mg, Intramuscular, PRN, Long Payan MD    glucose chewable tablet 16 g, 16 g, Oral, PRN, Long Payan MD    glucose chewable tablet 24 g, 24 g, Oral, PRN, Long Payan MD    hydrALAZINE injection 10 mg, 10 mg, Intravenous, Q6H PRN, Long Payan MD    HYDROcodone-acetaminophen 7.5-325 mg per tablet 1 tablet, 1 tablet, Oral, Q6H PRN, Long Payan MD    insulin aspart U-100 pen 0-10 Units, 0-10 Units, Subcutaneous, Q4H PRN, Dottie North, NP, 4 Units at 03/19/25 1055    insulin glargine U-100 (Lantus) pen 25 Units, 25 Units, Subcutaneous, BID, Dottie North NP, 25 Units at 03/19/25 0845    metoprolol succinate (TOPROL-XL) 24 hr tablet 25 mg, 25 mg, Oral, Daily, Long Payan MD, 25 mg at 03/19/25 0846    morphine injection 4 mg, 4 mg, Intravenous, Q6H PRN, Long Payan MD, 4 mg at 03/15/25 0617    ondansetron disintegrating tablet 4 mg, 4 mg, Oral, Q6H PRN, Long Payan MD, 4 mg at 03/15/25 0617    predniSONE tablet 10 mg, 10 mg, Oral, BID, Jaymie Medley MD, 10 mg at 03/19/25 0845    tacrolimus capsule 2 mg, 2 mg, Oral, Daily AM, 2 mg at 03/19/25 0846 **AND** tacrolimus capsule 1.5 mg, 1.5 mg, Oral, Daily PM, Jaymie Medley MD, 1.5 mg at 03/18/25 1714    vancomycin 125 mg/5 mL oral solution 125 mg, 125 mg, Oral, Q6H, Long Payan MD, 125 mg at 03/19/25 1209

## 2025-03-19 NOTE — ASSESSMENT & PLAN NOTE
Creatine stable for now. BMP reviewed- noted Estimated Creatinine Clearance: 49.6 mL/min (A) (based on SCr of 1.8 mg/dL (H)). according to latest data. Based on current GFR, CKD stage is stage 4 - GFR 15-29.  Monitor UOP and serial BMP and adjust therapy as needed. Renally dose meds. Avoid nephrotoxic medications and procedures.    Likely sec to lasix, Diarrhea- treat w IVF, hold Lasix    Etiology unclear for ANGELITO- started on IVF    03/19/2025  Creatinine improved   Will DC IVF   Continue to monitor

## 2025-03-19 NOTE — ASSESSMENT & PLAN NOTE
"Patient has Systolic (HFrEF) heart failure that is Acute on chronic. On presentation their CHF was decompensated. Evidence of decompensated CHF on presentation includes: edema and weight gain. The etiology of their decompensation is likely increased fluid intake. Most recent BNP and echo results are listed below.  No results for input(s): "BNP" in the last 72 hours.    Latest ECHO  Results for orders placed during the hospital encounter of 11/26/24    Echo    Interpretation Summary    Left Ventricle: The left ventricle is normal in size. Normal wall thickness. There is concentric hypertrophy. Normal wall motion. Septal motion is consistent with bundle branch block. There is moderately reduced systolic function. Ejection fraction is approximately 35%. Grade I diastolic dysfunction.    Right Ventricle: Normal right ventricular cavity size. Wall thickness is normal. Systolic function is normal.    IVC/SVC: Normal venous pressure at 3 mmHg.    Current Heart Failure Medications  metoprolol succinate (TOPROL-XL) 24 hr tablet 25 mg, Daily, Oral  hydrALAZINE injection 10 mg, Every 6 hours PRN, Intravenous    Plan  - Monitor strict I&Os and daily weights.    - Place on telemetry  - Low sodium diet  - Place on fluid restriction of 1.5 L.   - Cardiology has not been consulted    Will repeat echo  Continue Lasix    Bun/Cr rising- hold lasix  Give gentle hydration    Started on IVF    03/19/2025  Patient appears euvolemic   Will DC IVF   Patient clinically improving  "

## 2025-03-19 NOTE — PLAN OF CARE
Free from falls. Bed alarm set. Cardiac monitored. Blood glucose checks q4.  IVF maintained. Turn q2. No s/s of acute distress.

## 2025-03-20 VITALS
DIASTOLIC BLOOD PRESSURE: 68 MMHG | OXYGEN SATURATION: 99 % | HEIGHT: 72 IN | WEIGHT: 256.38 LBS | BODY MASS INDEX: 34.72 KG/M2 | HEART RATE: 77 BPM | RESPIRATION RATE: 18 BRPM | TEMPERATURE: 97 F | SYSTOLIC BLOOD PRESSURE: 134 MMHG

## 2025-03-20 LAB
ALBUMIN SERPL BCP-MCNC: 2.2 G/DL (ref 3.5–5.2)
ANION GAP SERPL CALC-SCNC: 8 MMOL/L (ref 8–16)
BUN SERPL-MCNC: 55 MG/DL (ref 8–23)
CALCIUM SERPL-MCNC: 8.3 MG/DL (ref 8.7–10.5)
CHLORIDE SERPL-SCNC: 119 MMOL/L (ref 95–110)
CO2 SERPL-SCNC: 17 MMOL/L (ref 23–29)
CREAT SERPL-MCNC: 1.7 MG/DL (ref 0.5–1.4)
EST. GFR  (NO RACE VARIABLE): 43 ML/MIN/1.73 M^2
GLUCOSE SERPL-MCNC: 141 MG/DL (ref 70–110)
MAGNESIUM SERPL-MCNC: 1.8 MG/DL (ref 1.6–2.6)
PHOSPHATE SERPL-MCNC: 3.3 MG/DL (ref 2.7–4.5)
POCT GLUCOSE: 125 MG/DL (ref 70–110)
POTASSIUM SERPL-SCNC: 4 MMOL/L (ref 3.5–5.1)
SODIUM SERPL-SCNC: 144 MMOL/L (ref 136–145)
URATE SERPL-MCNC: 9.8 MG/DL (ref 3.4–7)

## 2025-03-20 PROCEDURE — 84550 ASSAY OF BLOOD/URIC ACID: CPT | Performed by: INTERNAL MEDICINE

## 2025-03-20 PROCEDURE — 80069 RENAL FUNCTION PANEL: CPT | Performed by: INTERNAL MEDICINE

## 2025-03-20 PROCEDURE — 97530 THERAPEUTIC ACTIVITIES: CPT

## 2025-03-20 PROCEDURE — 83735 ASSAY OF MAGNESIUM: CPT | Performed by: INTERNAL MEDICINE

## 2025-03-20 PROCEDURE — 25000003 PHARM REV CODE 250: Performed by: FAMILY MEDICINE

## 2025-03-20 PROCEDURE — 99232 SBSQ HOSP IP/OBS MODERATE 35: CPT | Mod: ,,, | Performed by: INTERNAL MEDICINE

## 2025-03-20 PROCEDURE — 63600175 PHARM REV CODE 636 W HCPCS: Performed by: EMERGENCY MEDICINE

## 2025-03-20 PROCEDURE — 36415 COLL VENOUS BLD VENIPUNCTURE: CPT | Performed by: INTERNAL MEDICINE

## 2025-03-20 PROCEDURE — 25000003 PHARM REV CODE 250: Performed by: EMERGENCY MEDICINE

## 2025-03-20 PROCEDURE — 63600175 PHARM REV CODE 636 W HCPCS: Performed by: INTERNAL MEDICINE

## 2025-03-20 PROCEDURE — 97530 THERAPEUTIC ACTIVITIES: CPT | Mod: CQ

## 2025-03-20 RX ORDER — ALLOPURINOL 100 MG/1
50 TABLET ORAL DAILY
Qty: 15 TABLET | Refills: 11 | Status: SHIPPED | OUTPATIENT
Start: 2025-03-20 | End: 2026-03-20

## 2025-03-20 RX ORDER — FUROSEMIDE 40 MG/1
40 TABLET ORAL DAILY
Qty: 30 TABLET | Refills: 11 | Status: SHIPPED | OUTPATIENT
Start: 2025-03-20 | End: 2026-03-20

## 2025-03-20 RX ADMIN — TACROLIMUS 2 MG: 1 CAPSULE ORAL at 09:03

## 2025-03-20 RX ADMIN — ASPIRIN 81 MG: 81 TABLET, COATED ORAL at 09:03

## 2025-03-20 RX ADMIN — VANCOMYCIN HYDROCHLORIDE 125 MG: KIT at 05:03

## 2025-03-20 RX ADMIN — Medication 1 TABLET: at 09:03

## 2025-03-20 RX ADMIN — PREDNISONE 10 MG: 10 TABLET ORAL at 09:03

## 2025-03-20 RX ADMIN — MYCOPHENOLATE MOFETIL 500 MG: 250 CAPSULE ORAL at 09:03

## 2025-03-20 RX ADMIN — FERROUS SULFATE TAB 325 MG (65 MG ELEMENTAL FE) 1 EACH: 325 (65 FE) TAB at 09:03

## 2025-03-20 RX ADMIN — INSULIN GLARGINE 25 UNITS: 100 INJECTION, SOLUTION SUBCUTANEOUS at 09:03

## 2025-03-20 RX ADMIN — CHOLESTYRAMINE 4 G: 4 POWDER, FOR SUSPENSION ORAL at 09:03

## 2025-03-20 RX ADMIN — COLCHICINE 0.6 MG: 0.6 TABLET ORAL at 09:03

## 2025-03-20 RX ADMIN — METOPROLOL SUCCINATE 25 MG: 25 TABLET, EXTENDED RELEASE ORAL at 09:03

## 2025-03-20 RX ADMIN — CALCITRIOL CAPSULES 0.25 MCG 0.25 MCG: 0.25 CAPSULE ORAL at 09:03

## 2025-03-20 RX ADMIN — VANCOMYCIN HYDROCHLORIDE 125 MG: KIT at 11:03

## 2025-03-20 RX ADMIN — APIXABAN 5 MG: 2.5 TABLET, FILM COATED ORAL at 09:03

## 2025-03-20 RX ADMIN — FAMOTIDINE 20 MG: 20 TABLET, FILM COATED ORAL at 09:03

## 2025-03-20 NOTE — PROGRESS NOTES
Nephrology Progress Note     History of Present Illness     69-year-old gentleman/nursing outpatient with history of ESRD.  Underwent living related donor kidney transplantation 10 years ago.  Baseline creatinine was 1.5 .  Followed by Dr. Gonsalez of [facility].  History of CMV viremia with titer positive as recently as April of 2023 per outpatient notes from Dr. Gonsalez.  Patient was having some issues with GI symptoms and it was concern for CMV colitis but endoscopy failed to improve evidence of such.  It is felt that his heart failure and use of diuretics leads to prerenal azotemia and variable creatinine values at times.     Recent history significant for UTI in February and Clostridium difficile infection complicated by some ANGELITO with creatinine rising to 4.3.  The latter was apparently partly due to concerns for possible over-diuresis at his nursing home.  At that time offending medications held.  Creatinine improved and was back to baseline of 1.5 in early March and actually as low as 1.3 shortly thereafter.  Of note, when discharged and being treated for his Clostridium difficile infection he was told to hold his CellCept and not resume that until March 20th.  Of note, in January he had arthrocentesis in setting of knee pain revealing urate crystals and was treated for gout.  That was a new diagnosis.     Patient admitted here on March 13th with complaints of edema.  Echocardiogram with LVEF 35-40% and grade 1 diastolic dysfunction.  On admission creatinine had been 1.8.  Received diuretics with expected rise in creatinine.  Diuretics held and IV fluids started now creatinine improving.  Has had issues suspected gout this hospital stay and has been treated with steroids and colchicine.  Multiple other medical problems including deep venous thrombosis.  On apixaban.      Immunosuppression being achieved with tacrolimus and prednisone currently.  Typically takes mycophenolate as well but that is on hold  with recent C diff infection and diarrhea.       Prior notes from Dr. Gonsalez indicates that hypotension has prevented more aggressive diuresis in the past.    Interval History     Overnight/currently:  Afebrile.  Vital signs stable.  Diuretics have not been restarted.  1 L of urine output yesterday.  Patient's creatinine continues to slowly improve.  No new complaints.  Denies chest pain, shortness of breath, nausea, or vomiting.  Apparently has been med considering discharge.  Appears ready for discharge.  We will need reinstitution of diuretic.  I counseled patient and daughter extensively about checking daily weights and addition of sliding scale diuretic dose to usual diuretic dose.     Health Status   Allergies:    is allergic to lisinopril, actos  [pioglitazone], and metformin.    Current medications:   Current Medications[1]     Physical Examination   VS/Measurements   /68 (BP Location: Right arm, Patient Position: Lying)   Pulse 77   Temp 97.4 °F (36.3 °C) (Oral)   Resp 18   Ht 6' (1.829 m)   Wt 116.3 kg (256 lb 6.3 oz)   SpO2 99%   BMI 34.77 kg/m²      General:  Alert and oriented X3, No acute distress.         Nutritional status: Obese.    Neck:  Supple, No lymphadenopathy.     Respiratory:  Lungs are clear to auscultation, Respirations are non-labored, Symmetrical chest wall expansion.    Cardiovascular:  Normal rate, Regular rhythm.  One-2+ lower extremity edema.  Gastrointestinal:  Soft, Non-tender, Normal bowel sounds.   Integumentary:  Warm, Dry.   Psychiatric:  Cooperative, Appropriate mood & affect.        Review / Management   Laboratory Results   Today's Lab Results :    Recent Results (from the past 24 hours)   POCT glucose    Collection Time: 03/19/25 10:16 PM   Result Value Ref Range    POCT Glucose 248 (H) 70 - 110 mg/dL   Uric acid    Collection Time: 03/20/25  5:55 AM   Result Value Ref Range    Uric Acid 9.8 (H) 3.4 - 7.0 mg/dL   Magnesium    Collection Time: 03/20/25  5:55 AM    Result Value Ref Range    Magnesium 1.8 1.6 - 2.6 mg/dL   Renal Function Panel    Collection Time: 03/20/25  5:55 AM   Result Value Ref Range    Glucose 141 (H) 70 - 110 mg/dL    Sodium 144 136 - 145 mmol/L    Potassium 4.0 3.5 - 5.1 mmol/L    Chloride 119 (H) 95 - 110 mmol/L    CO2 17 (L) 23 - 29 mmol/L    BUN 55 (H) 8 - 23 mg/dL    Calcium 8.3 (L) 8.7 - 10.5 mg/dL    Creatinine 1.7 (H) 0.5 - 1.4 mg/dL    Albumin 2.2 (L) 3.5 - 5.2 g/dL    Phosphorus 3.3 2.7 - 4.5 mg/dL    eGFR 43 (A) >60 mL/min/1.73 m^2    Anion Gap 8 8 - 16 mmol/L   POCT glucose    Collection Time: 03/20/25  6:03 AM   Result Value Ref Range    POCT Glucose 125 (H) 70 - 110 mg/dL        Impression and Plan   Diagnosis     Combined heart failure exacerbation.  LVEF 30-40%.  Diuresed aggressively earlier in hospital stay leading to worsening creatinine.  Diuretics held and placed on fluids.  Those have now been stopped.  Currently euvolemic.  Would resume furosemide I would recommend 80 mg x 3 days and then 40 mg thereafter.  Explained salt balance and physiology of heart failure.  I do not think this patient can handle any of this myself and we will need significant help from his daughter.     Acute kidney injury superimposed upon CKD.  ANGELITO due to volume depletion with diuretics.  Improving.  Nearing baseline.       CKD stage 3/chronic allograft nephropathy with baseline creatinine running 1.3-1.5.       Status post living related donor kidney transplantation in 2015.  Follows with Dr. Gonsalez.  On tacrolimus and on prednisone.  Unclear reasons hospital medicine had change tacrolimus dosing earlier during this hospital stay.  I have resumed prior dosing.  Mycophenolate has been held in setting of recent C diff infection but he has been treated for such and mycophenolate resumed morning.  He will need close follow up with Dr. Gonsalez and I recommend he get tacrolimus level in the next 1-2 weeks.     Hypertension and CKD.  Adequate control acutely.      Multifactorial anemia.  On oral iron.  Follow.     CKD-MBD/secondary hyperparathyroidism of renal origin.  On daily calcitriol.  Corrected calcium acceptable.  PTH elevated at 257.  We will defer to his outpatient nephrologist to address this.     Recent C diff infection.  Remains on oral vancomycin.     Gout flare.  Status post steroids and colchicine.  Uric acid 9.8.  I think he needs xanthine oxidase inhibitor to address this problem.  ALT has been normal.  Recommend initiation of allopurinol with titration as needed to lower uric acid to 6 or less.     Metabolic acidosis in setting of diarrhea and renal failure.  Bicarbonate improving.  I think reinstitution of diuretics will likely be adequate would hold off of bicarbonate supplementation.     History of deep venous thrombosis.  Apixaban has been restarted.     Type 2 diabetes.  On scheduled insulin.  Defer care of this problem to primary team.    Disposition: Appears ready for discharge.  Nephrology will sign off.  Call with questions.    ______________________________________________   Grace Schaefer MD    This document was created using voice recognition software.  It is possible that there are errors which have persisted after original proofreading.  If there is a question regarding contents of this document please contact me for clarification.         [1]   Current Facility-Administered Medications:     acetaminophen tablet 650 mg, 650 mg, Oral, Q6H PRN, Long Payan MD    apixaban tablet 5 mg, 5 mg, Oral, BID, Long Payan MD, 5 mg at 03/20/25 0913    aspirin EC tablet 81 mg, 81 mg, Oral, Daily, Long Payan MD, 81 mg at 03/20/25 0913    calcitRIOL capsule 0.25 mcg, 0.25 mcg, Oral, Daily, Long Payan MD, 0.25 mcg at 03/20/25 0912    cholestyramine 4 gram packet 4 g, 1 packet, Oral, BID, Jaymie Medley MD, 4 g at 03/20/25 0912    colchicine capsule/tablet 0.6 mg, 0.6 mg, Oral, Daily, Jaymie Medley MD, 0.6 mg at 03/20/25 0913    dextrose 50% injection 12.5 g, 12.5  g, Intravenous, PRN, Long Payan MD    dextrose 50% injection 25 g, 25 g, Intravenous, PRN, Long Payan MD    famotidine tablet 20 mg, 20 mg, Oral, Daily, Long Payan MD, 20 mg at 03/20/25 0913    ferrous sulfate tablet 1 each, 1 tablet, Oral, Every other day, Long Payan MD, 1 each at 03/20/25 0912    folic acid-vit B6-vit B12 2.5-25-2 mg tablet 1 tablet, 1 tablet, Oral, Daily, Jaymie Medley MD, 1 tablet at 03/20/25 0913    glucagon (human recombinant) injection 1 mg, 1 mg, Intramuscular, PRN, Long Payan MD    glucose chewable tablet 16 g, 16 g, Oral, PRN, Long Payan MD    glucose chewable tablet 24 g, 24 g, Oral, PRN, Long Payan MD    hydrALAZINE injection 10 mg, 10 mg, Intravenous, Q6H PRN, Long Payan MD    HYDROcodone-acetaminophen 7.5-325 mg per tablet 1 tablet, 1 tablet, Oral, Q6H PRN, Long Payan MD, 1 tablet at 03/19/25 2317    insulin aspart U-100 pen 0-10 Units, 0-10 Units, Subcutaneous, Q4H PRN, Dottie North, NP, 4 Units at 03/19/25 2218    insulin glargine U-100 (Lantus) pen 25 Units, 25 Units, Subcutaneous, BID, Dottie North, NP, 25 Units at 03/20/25 0913    metoprolol succinate (TOPROL-XL) 24 hr tablet 25 mg, 25 mg, Oral, Daily, Long Payan MD, 25 mg at 03/20/25 0912    morphine injection 4 mg, 4 mg, Intravenous, Q6H PRN, Long Payan MD, 4 mg at 03/15/25 0617    mycophenolate capsule 500 mg, 500 mg, Oral, BID, Grace Schaefer MD, 500 mg at 03/20/25 0913    ondansetron disintegrating tablet 4 mg, 4 mg, Oral, Q6H PRN, Long Payan MD, 4 mg at 03/15/25 0617    predniSONE tablet 10 mg, 10 mg, Oral, BID, Jaymie Medley MD, 10 mg at 03/20/25 0913    tacrolimus capsule 2 mg, 2 mg, Oral, Daily AM, 2 mg at 03/20/25 0912 **AND** tacrolimus capsule 3 mg, 3 mg, Oral, Daily PM, Grace Schaefer MD, 3 mg at 03/19/25 1817    vancomycin 125 mg/5 mL oral solution 125 mg, 125 mg, Oral, Q6H, Long Payan MD, 125 mg at 03/20/25 1146

## 2025-03-20 NOTE — PT/OT/SLP PROGRESS
Physical Therapy  Treatment    Mitch Whittaker   MRN: [id number]   Admitting Diagnosis: Acute on chronic systolic congestive heart failure    PT Received On: 03/20/25  PT Start Time: 0935     PT Stop Time: 0958    PT Total Time (min): 23 min       Billable Minutes:  Therapeutic Activity 23    Treatment Type: Treatment  PT/PTA: PTA     Number of PTA visits since last PT visit: 1       General Precautions: Standard, fall, special contact  Orthopedic Precautions: N/A  Braces: N/A  Respiratory Status: Room air         Subjective:  Communicated with patient's nurse Sarina and performed Epic chart review prior to session.  Patient agreeable to participate with therapy. States that he has been wanting to stand up and get OOB.         Objective:   Patient found with: peripheral IV, telemetry    Functional Mobility:  Bed Mobility:    Min A BM to EOB    Transfers:   Mod A x 2 STS with RW  Mod A x 2 side steps and turning from bed to chair with RW    Gait:    Not tested, unable to at this time d/t lack of knee stability    Treatment and Education:  Educated patient on importance of increased tolerance to upright position and direct impact on CV endurance and strength. Patient encouraged to sit up in chair/ EOB, for a minimum of 2 consecutive hours, 3x per day. Encouraged patient to perform AROM TE to BLE throughout the day within all available planes of motion. Re enforced importance of utilizing call light to meet needs in room and not attempt to get up without staff assistance. Patient verbalized understanding and agreed to comply.      AM-PAC 6 CLICK MOBILITY  How much help from another person does this patient currently need?   1 = Unable, Total/Dependent Assistance  2 = A lot, Maximum/Moderate Assistance  3 = A little, Minimum/Contact Guard/Supervision  4 = None, Modified [place]/Independent    Turning over in bed (including adjusting bedclothes, sheets and blankets)?: 3  Sitting down on and standing up from a chair  with arms (e.g., wheelchair, bedside commode, etc.): 2  Moving from lying on back to sitting on the side of the bed?: 3  Moving to and from a bed to a chair (including a wheelchair)?: 2  Need to walk in hospital room?: 1  Climbing 3-5 steps with a railing?: 1  Basic Mobility Total Score: 12    AM-PAC Raw Score CMS G-Code Modifier Level of Impairment Assistance   6 % Total / Unable   7 - 9 CM 80 - 100% Maximal Assist   10 - 14 CL 60 - 80% Moderate Assist   15 - 19 CK 40 - 60% Moderate Assist   20 - 22 CJ 20 - 40% Minimal Assist   23 CI 1-20% SBA / CGA   24 CH 0% Independent/ Mod I     Patient left up in chair with all lines intact, call button in reach, and chair alarm on.    Assessment:  Mitch Whittaker is a 69 y.o. male with a medical diagnosis of Acute on chronic systolic congestive heart failure and presents with overall decline in functional mobility. Patient would continue to benefit from skilled PT to address functional limitations listed below in order to return to PLOF/decrease caregiver burden.  Much improved mobility today. Still lacking stability to produce meaningful gait, but able to transfer well. Still presenting with need for moderate assistance with mobility due to generalized weakness and lack of endurance.     Rehab identified problem list/impairments: weakness, impaired endurance, impaired self care skills, impaired functional mobility, gait instability, impaired balance, decreased coordination, decreased lower extremity function, decreased safety awareness, decreased ROM, impaired cardiopulmonary response to activity    Rehab potential is fair.    Activity tolerance: Fair    Discharge recommendations: Moderate Intensity Therapy      Barriers to discharge:      Equipment recommendations: to be determined by next level of care     GOALS:   Multidisciplinary Problems       Physical Therapy Goals          Problem: Physical Therapy    Goal Priority Disciplines Outcome Interventions   Physical  Therapy Goal     PT, PT/OT     Description: LTG'S TO BE MET IN 14 DAYS (3-30-25)  PT WILL REQUIRE MODA FOR BED MOBILITY  PT WILL REQUIRE MODA FOR SIT<>STAND TF WITH RW  PT WILL REQUIRE MODA FOR BED<>CHAIR TF WITH RW  PT WILL INC AMPAC SCORE BY 2 POINTS TO PROGRESS GROSS FUNC MOBILITY                         DME Justifications:  No DME recommended requiring DME justifications    PLAN:    Patient to be seen 3 x/week to address the above listed problems via gait training, therapeutic activities, therapeutic exercises  Plan of Care expires: 03/30/25  Plan of Care reviewed with: patient         03/20/2025

## 2025-03-20 NOTE — PT/OT/SLP PROGRESS
Occupational Therapy   Treatment    Name: Mitch Whittaker  MRN: [id number]  Admitting Diagnosis:  Acute on chronic systolic congestive heart failure       Recommendations:     Discharge Recommendations: Moderate Intensity Therapy  Discharge Equipment Recommendations:  to be determined by next level of care  Barriers to discharge:  None    Assessment:     Mitch Whittaker is a 69 y.o. male with a medical diagnosis of Acute on chronic systolic congestive heart failure.  He presents with the following performance deficits affecting function are weakness, impaired endurance, impaired self care skills, impaired functional mobility, gait instability, impaired balance, decreased coordination, decreased upper extremity function, decreased lower extremity function, decreased safety awareness, decreased ROM, impaired coordination, impaired cardiopulmonary response to activity.     Rehab Prognosis:  Fair; patient would benefit from acute skilled OT services to address these deficits and reach maximum level of function.       Plan:     Patient to be seen 2 x/week to address the above listed problems via self-care/home management, therapeutic activities, therapeutic exercises  Plan of Care Expires: 03/31/25  Plan of Care Reviewed with: patient    Subjective     Chief Complaint: Left knee pain  Patient/Family Comments/goals: Go back home  Pain/Comfort:  Pain Rating 1: 0/10  Location - Side 1: Left  Location - Orientation 1: generalized  Location 1: knee  Pain Addressed 1: Reposition  Pain Rating Post-Intervention 1: 0/10  Pain Addressed 2: Reposition, Distraction    Objective:     Communicated with: Sarina and EPIC prior to session.  Patient found HOB elevated with peripheral IV, telemetry upon OT entry to room.    General Precautions: Standard, fall, special contact    Orthopedic Precautions:N/A  Braces: N/A  Respiratory Status: Room air     Occupational Performance:     Bed Mobility:    Patient completed Rolling/Turning to Left  with  contact guard assistance  Patient completed Scooting/Bridging with contact guard assistance  Patient completed Supine to Sit with minimum assistance     Functional Mobility/Transfers:  Patient completed Sit <> Stand Transfer with moderate assistance x2  with  rolling walker x2 trials  Patient completed Bed <> Chair Transfer using Stand Pivot technique with moderate assistance x 2 with rolling walker  Functional Mobility: Pt unsafe for FM at this time        [facility] 6 Click ADL: 14    Treatment & Education:  Pt tolerated Sit 2 stand x3 well. Decrease in pain this date. Pt willing to sit in chair as long as possible and had good attitude towards Tx. Pt completed the following Therex 1x10 in order to increase BM and FM:   Shoulder flexion   Elbow Flexion   Hand squeezes  OT role, plan of care, progression of goals, importance of continued OOB activity, ADL/functional transfer and mobility retraining, call don't fall, safety precautions, fall prevention. Pt educated on sitting in chair for 1 hour during breakfast lunch and dinner in order to change positions, regulate BP and reduce bed sores.   Pt acknowledges and agrees with POC given by OT.      Patient left up in chair with all lines intact, call button in reach, and chair alarm on    GOALS:   Multidisciplinary Problems       Occupational Therapy Goals          Problem: Occupational Therapy    Goal Priority Disciplines Outcome Interventions   Occupational Therapy Goal     OT, PT/OT Progressing    Description: O.T. GOALS TO BE MET BY 3-31-25  PT WILL TOLERATE 1 SET X15 REPS B UE ROM EXERCISE  MIN A WITH UE DRESSING  MOD A WITH BSC TRANSFER                             Time Tracking:     OT Date of Treatment: 03/20/25  OT Start Time: 0935  OT Stop Time: 1000  OT Total Time (min): 25 min    Billable Minutes:Therapeutic Activity 15  Therapeutic Exercise 10    OT/ANNIA: ANNIA     Number of ANNIA visits since last OT visit: 1  A client care conference was performed between  the LOTR and GERBER, prior to treatment by GERBER, to discuss the patient's status, treatment plan and established goals.  ZABRINA Mancilla    3/20/2025   No

## 2025-03-20 NOTE — PLAN OF CARE
[facility] - Med Surg  Discharge Final Note    Primary Care Provider: Valery Caal MD    Expected Discharge Date: 3/20/2025    Final Discharge Note (most recent)       Final Note - 03/20/25 1450          Final Note    Assessment Type Discharge Planning Assessment     Anticipated Discharge Disposition Home-Health Care [facility]     Hospital Resources/Appts/Education Provided Appointments scheduled and added to AVS        Post-Acute Status    Post-Acute Authorization Home Health     Home Health Status Set-up Complete/Auth obtained     Discharge Delays None known at this time                     Important Message from Medicare             After-discharge care                Home Medical Care       *[facility] [place]   Service: Home Health Services    [address] [address]   Phone: [phone]                     DC Dispo: home with home health    PCP: no appt suggestion available    DME: lina lift delivered by [facility] to patient's home    [facility] arranged.    CM reviewed chart; no other d/c needs noted.

## 2025-03-20 NOTE — PLAN OF CARE
Pt tolerated Tx well. Pt completed BM roll and scoot CGA, EOB Min A, Sit 2 Stand x2 trials Mod A x2. SPT to chair from EOB w/RW Mod Ax2

## 2025-03-22 NOTE — DISCHARGE SUMMARY
[facility] [place] Medicine  Discharge Summary      Patient Name: Mitch Whittaker  MRN: [id number]  [facility]: [id number]  Patient Class: IP- Inpatient  Admission Date: 3/13/2025  Hospital Length of Stay: 6 days  Discharge Date and Time: 3/20/2025  5:45 PM  Attending Physician: Mellisa att. providers found   Discharging Provider: Long Payan MD  Primary Care Provider: Valery Caal MD    Primary Care Team: Networked reference to record PCT     HPI:   Patient is a 69-year-old  male with PMH of CHF, kidney transplant, CAD, DM, DVT who presents to the ED for complaints of pain and swelling to bilateral arms.  Of note patient was recently discharged on 03/12/2025 after being treated for ANGELITO and UTI.  Patient developed gross hematuria during stay and Eliquis was held.  Hematuria resolved and Eliquis was resumed prior to DC.  Patient tested positive for C diff and oral vancomycin was initiated.  Plan initially was for discharge back to [facility] however patient was unable to provide financials and was discharged home with home health.  Patient states since being discharged he has developed bilateral edema to his arms.  Tenderness also reported.  Denies any fever.  Patient still complains of diarrhea.    In the ED, H&H 9/29.6, BUN/CR 31/1.8, , D-dimer 3.5.  Chest x-ray showed mild vascular congestion.  Lasix 40 mg initiated.    * No surgery found *      Hospital Course:   69-year-old AAM with Hx of CHF, kidney transplant, CAD, DM, DVT who presented to the ED for pain and swelling to bilateral arms. Pt was just discharged on 03/12/2025 after being treated for ANGELITO and UTI and C diff colitis with oral Vanc. Pt developed gross hematuria during stay and Eliquis was held. Hematuria resolved and Eliquis was resumed prior to DC.  Patient tested positive for C diff and oral vancomycin was initiated.  Plan initially was to discharge back to [facility] however patient was unable to provide financials and  was discharged home with home health.  Patient states since being discharged he has developed bilateral edema to his arms.  Tenderness also reported.  Denies any fever.  Patient still complains of diarrhea.     In the ED, H&H 9/29.6, BUN/CR 31/1.8, , D-dimer 3.5.  Chest x-ray showed mild vascular congestion.  Lasix 40 mg initiated.      Also suspect mild gout- will give steroids and oral colchicine. Cont oral vanc for C diff.    3/15- looks and feels better, good response to steroids, BUE pain and swelling improving, able to lift his arms now. Cont present care. Will start PT/OT and start inj b12 plus triple vitamins, d/c home soon.     3/16- looks and feels better, more alert dn responsive, eating with both his hands easily, swelling, pain much better. Bun/Cr still rising, likely from diarrhea from C Diff- will add Questran and start IVF. Also BS was very high last evening sec to Prednisone for his gout- BS better now- will lower Prednisone and cont Lantus plus SSI.    3/17- Appreciate Dr. Mandujano- pt doing well, moving arms well, no diarrhea, eating drinking well. But Cr cleo to 2.9- hence renal consulted and started on IVF- no obvious etiology for the ANGELITO/Cr 2.9. also have halved the doses of the Tacrolimus. Renal following. Also d/w family, they are refusing SNF and will take care of him at home. HH will be ordered.  ESR/CRP very high- likely from his C Diff and Gout.     03/18/2025  Creatinine trending down with gentle IVF.  Continue current management.  Nephrology on case.  Bicarb supplementation.  03/19/2025  Will DC bicarb drip.  Patient appears clinically improving.  Continue current management.  Possible DC tomorrow.    Patient was discharged home with home health. Lasix 80mg x 3 days then lasix 40mg daily.      Goals of Care Treatment Preferences:  Code Status: Full Code         Consults:   Consults (From admission, onward)          Status Ordering Provider     Inpatient consult to Nephrology  Once    "     Provider:  Sal Mandujano MD    Completed MICHELE TEMPLE            Assessment & Plan  Acute on chronic systolic congestive heart failure  Patient has Systolic (HFrEF) heart failure that is Acute on chronic. On presentation their CHF was decompensated. Evidence of decompensated CHF on presentation includes: edema and weight gain. The etiology of their decompensation is likely increased fluid intake. Most recent BNP and echo results are listed below.  No results for input(s): "BNP" in the last 72 hours.    Latest ECHO  Results for orders placed during the hospital encounter of 11/26/24    Echo    Interpretation Summary    Left Ventricle: The left ventricle is normal in size. Normal wall thickness. There is concentric hypertrophy. Normal wall motion. Septal motion is consistent with bundle branch block. There is moderately reduced systolic function. Ejection fraction is approximately 35%. Grade I diastolic dysfunction.    Right Ventricle: Normal right ventricular cavity size. Wall thickness is normal. Systolic function is normal.    IVC/SVC: Normal venous pressure at 3 mmHg.    Current Heart Failure Medications  furosemide (LASIX) tablet, Daily, Oral    Plan  - Monitor strict I&Os and daily weights.    - Place on telemetry  - Low sodium diet  - Place on fluid restriction of 1.5 L.   - Cardiology has not been consulted    Will repeat echo  Continue Lasix    Bun/Cr rising- hold lasix  Give gentle hydration    Started on IVF    03/22/2025  Patient appears euvolemic   Will DC IVF   Patient clinically improving  C. difficile colitis  Continue p.o. vancomycin  Diarrhea improving  Acute gout of left elbow  Good response to Solumedrol and Colchicine- cont    Much better  Check ESR, CRP in am    Improving. Cont steroids  D/c colchicine  ANGELITO on CKD 4  Creatine stable for now. BMP reviewed- noted Estimated Creatinine Clearance: 52.5 mL/min (A) (based on SCr of 1.7 mg/dL (H)). according to latest data. Based on current " "GFR, CKD stage is stage 4 - GFR 15-29.  Monitor UOP and serial BMP and adjust therapy as needed. Renally dose meds. Avoid nephrotoxic medications and procedures.    Likely sec to lasix, Diarrhea- treat w IVF, hold Lasix    Etiology unclear for ANGELITO- started on IVF    03/22/2025  Creatinine improved   Will DC IVF   Continue to monitor  Deep vein thrombosis (DVT) of lower extremity  Continue Eliquis   Lower extremity ultrasound    Anemia, chronic disease  Stable   Continue to monitor  Chronic anticoagulation  Continue Eliquis    Type II diabetes mellitus with renal manifestations  Patient's FSGs are controlled on current medication regimen.  Last A1c reviewed-   Lab Results   Component Value Date    HGBA1C 5.7 (H) 12/17/2024     Most recent fingerstick glucose reviewed-   No results for input(s): "POCTGLUCOSE" in the last 24 hours.    Current correctional scale  Low  Maintain anti-hyperglycemic dose as follows-   Antihyperglycemics (From admission, onward)      None          Hold Oral hypoglycemics while patient is in the hospital.    BS high sec to steroids, which were lowered and lantus/SSI increased    BS still high, cont Lantus/SSI  Chronic immunosuppression with Prograf, MMF and prednisone  Continue Prograf and CellCept   Hold mycophenolate    On Solumedrol for acute gout at present    Lower the dose due to ANGELITO  Renal following  Final Active Diagnoses:    Diagnosis Date Noted POA    PRINCIPAL PROBLEM:  Acute on chronic systolic congestive heart failure [I50.23] 02/22/2024 Yes    Acute gout of left elbow [M10.9] 03/15/2025 Yes    C. difficile colitis [A04.72] 03/09/2025 Yes    Anemia, chronic disease [D63.8] 03/01/2025 Yes    ANGELITO on CKD 4 [N18.4] 06/24/2024 Yes    Chronic anticoagulation [Z79.01] 12/07/2022 Not Applicable    Deep vein thrombosis (DVT) of lower extremity [I82.409] 07/24/2019 Yes    Type II diabetes mellitus with renal manifestations [E11.29]  Yes    Chronic immunosuppression with Prograf, MMF and " prednisone [Z29.89] 06/18/2015 Not Applicable     Chronic      Problems Resolved During this Admission:       Discharged Condition: good    Disposition: Home or Self Care    Follow Up:   Contact information for after-discharge care       Home Medical Care       [facility] .    Service: Home Health Services  Contact information:  [address]  [facility] [address]  [phone]                                 Patient Instructions:      PATIENT (JEAN) LIFT FOR HOME USE     Order Specific Question Answer Comments   Height: 6' (1.829 m)    Weight: 117 kg (257 lb 15 oz)    Does patient have medical equipment at home? bedside commode    Does patient have medical equipment at home? hospital bed    Does patient have medical equipment at home? slide board    Length of need (1-99 months): 99      SUBSEQUENT HOME HEALTH ORDERS     Order Specific Question Answer Comments   What Home Health Agency is the patient currently using? [facility]        Significant Diagnostic Studies: N/A    Pending Diagnostic Studies:       None           Medications:  Reconciled Home Medications:      Medication List        PAUSE taking these medications      ENTRESTO  mg per tablet  Wait to take this until your doctor or other care provider tells you to start again.  Generic drug: sacubitriL-valsartan  Take 1 tablet by mouth 2 (two) times daily.            START taking these medications      allopurinoL 100 MG tablet  Commonly known as: ZYLOPRIM  Take ½ tablet (50 mg total) by mouth once daily.     mycophenolate 250 mg Cap  Commonly known as: CELLCEPT  Take 2 capsules (500 mg total) by mouth 2 (two) times daily.            CHANGE how you take these medications      furosemide 40 MG tablet  Commonly known as: LASIX  Take 1 tablet (40 mg total) by mouth once daily.  What changed:   when to take this  reasons to take this            CONTINUE taking these medications      * albuterol  "2.5 mg /3 mL (0.083 %) nebulizer solution  Commonly known as: PROVENTIL  Take 3 mLs (2.5 mg total) by nebulization every 4 to 6 hours as needed for Wheezing or Shortness of Breath.     * albuterol 90 mcg/actuation inhaler  Commonly known as: PROVENTIL/VENTOLIN HFA  Inhale 2 puffs into the lungs every 4 (four) hours as needed for Wheezing or Shortness of Breath.     aspirin 81 MG EC tablet  Commonly known as: ECOTRIN  Take 1 tablet by mouth Daily.     calcitRIOL 0.25 MCG Cap  Commonly known as: ROCALTROL  Take 1 capsule (0.25 mcg total) by mouth once daily.     cholestyramine 4 gram packet  Commonly known as: QUESTRAN  Take 1 packet (4 g total) by mouth 2 (two) times daily. for 9 days     famotidine 20 MG tablet  Commonly known as: PEPCID  TAKE ONE TABLET BY MOUTH EVERY EVENING     FeroSuL 325 mg (65 mg iron) Tab tablet  Generic drug: ferrous sulfate  Take 1 tablet (325 mg total) by mouth daily with breakfast.     fish oil-omega-3 fatty acids 300-1,000 mg capsule  Take 1 g by mouth once daily.     insulin aspart U-100 100 unit/mL (3 mL) Inpn pen  Commonly known as: NovoLOG  Inject 0-10 Units into the skin before meals and at bedtime as needed (Hyperglycemia). **MODERATE CORRECTION DOSE**  Blood Glucose  mg/dL                  Pre-meal                2200  151-200                2 units                    1 unit  201-250                4 units                    2 units  251-300                6 units                    3 units  301-350                8 units                    4 units  >350                     10 units                  5 units  Administer subcutaneously if needed at times designated by monitoring  schedule.  DO NOT HOLD correction dose insulin for patients who are  NPO.    "HIGH ALERT MEDICATION" - Administer with meals or TF/TPN.     ketoconazole 200 mg Tab  Commonly known as: NIZORAL  Take ½ tablet (100 mg total) by mouth once daily.     LANTUS SOLOSTAR U-100 INSULIN 100 unit/mL (3 mL) Inpn " pen  Generic drug: insulin glargine U-100 (Lantus)  Inject 15 Units into the skin 2 (two) times daily.     metoprolol succinate 25 MG 24 hr tablet  Commonly known as: TOPROL-XL  Take 1 tablet (25 mg total) by mouth once daily.     multivitamin tablet  Commonly known as: THERAGRAN  Take 1 tablet by mouth once daily.     OZEMPIC 2 mg/dose (8 mg/3 mL) Pnij  Generic drug: semaglutide  Inject 2 mg into the skin every 7 days.     predniSONE 5 MG tablet  Commonly known as: DELTASONE  Take 1 tablet (5 mg total) by mouth once daily.     rosuvastatin 40 MG Tab  Commonly known as: CRESTOR  Take 1 tablet (40 mg total) by mouth once daily.     sodium bicarbonate 650 MG tablet  Take 1 tablet (650 mg total) by mouth 2 (two) times daily.     tacrolimus 1 MG Cap  Commonly known as: PROGRAF  Take 4 capsules (4mg) every morning AND take 3 capsules (3mg) by mouth every evening     triamcinolone acetonide 0.1% 0.1 % ointment  Commonly known as: KENALOG  Apply topically 2 (two) times daily. Use on bilateral lower legs.           * This list has 2 medication(s) that are the same as other medications prescribed for you. Read the directions carefully, and ask your doctor or other care provider to review them with you.                STOP taking these medications      vancomycin 125 MG capsule  Commonly known as: VANCOCIN            ASK your doctor about these medications      amitriptyline 25 MG tablet  Commonly known as: ELAVIL  Take 1 tablet (25 mg total) by mouth nightly as needed for Insomnia.     ELIQUIS 5 mg Tab  Generic drug: apixaban  Take 1 tablet (5 mg total) by mouth 2 (two) times daily.     HYDROcodone-acetaminophen 5-325 mg per tablet  Commonly known as: NORCO  Take 1 tablet by mouth every 6 (six) hours as needed for Pain.     ondansetron 4 MG Tbdl  Commonly known as: ZOFRAN-ODT  Dissolve 1 tablet (4 mg total) by mouth every 8 (eight) hours as needed (Nausea/vomiting).              Indwelling Lines/Drains at time of discharge:    Lines/Drains/Airways       Drain  Duration                  Hemodialysis AV Fistula Right upper arm -- days                    Time spent on the discharge of patient: 36 minutes         Long Payan MD  Department of Hospital Medicine  '[facility]

## 2025-03-22 NOTE — ASSESSMENT & PLAN NOTE
"Patient has Systolic (HFrEF) heart failure that is Acute on chronic. On presentation their CHF was decompensated. Evidence of decompensated CHF on presentation includes: edema and weight gain. The etiology of their decompensation is likely increased fluid intake. Most recent BNP and echo results are listed below.  No results for input(s): "BNP" in the last 72 hours.    Latest ECHO  Results for orders placed during the hospital encounter of 11/26/24    Echo    Interpretation Summary    Left Ventricle: The left ventricle is normal in size. Normal wall thickness. There is concentric hypertrophy. Normal wall motion. Septal motion is consistent with bundle branch block. There is moderately reduced systolic function. Ejection fraction is approximately 35%. Grade I diastolic dysfunction.    Right Ventricle: Normal right ventricular cavity size. Wall thickness is normal. Systolic function is normal.    IVC/SVC: Normal venous pressure at 3 mmHg.    Current Heart Failure Medications  furosemide (LASIX) tablet, Daily, Oral    Plan  - Monitor strict I&Os and daily weights.    - Place on telemetry  - Low sodium diet  - Place on fluid restriction of 1.5 L.   - Cardiology has not been consulted    Will repeat echo  Continue Lasix    Bun/Cr rising- hold lasix  Give gentle hydration    Started on IVF    03/22/2025  Patient appears euvolemic   Will DC IVF   Patient clinically improving  "

## 2025-03-22 NOTE — ASSESSMENT & PLAN NOTE
"Patient's FSGs are controlled on current medication regimen.  Last A1c reviewed-   Lab Results   Component Value Date    HGBA1C 5.7 (H) 12/17/2024     Most recent fingerstick glucose reviewed-   No results for input(s): "POCTGLUCOSE" in the last 24 hours.    Current correctional scale  Low  Maintain anti-hyperglycemic dose as follows-   Antihyperglycemics (From admission, onward)      None          Hold Oral hypoglycemics while patient is in the hospital.    BS high sec to steroids, which were lowered and lantus/SSI increased    BS still high, cont Lantus/SSI  "

## 2025-03-22 NOTE — ASSESSMENT & PLAN NOTE
Creatine stable for now. BMP reviewed- noted Estimated Creatinine Clearance: 52.5 mL/min (A) (based on SCr of 1.7 mg/dL (H)). according to latest data. Based on current GFR, CKD stage is stage 4 - GFR 15-29.  Monitor UOP and serial BMP and adjust therapy as needed. Renally dose meds. Avoid nephrotoxic medications and procedures.    Likely sec to lasix, Diarrhea- treat w IVF, hold Lasix    Etiology unclear for ANGELITO- started on IVF    03/22/2025  Creatinine improved   Will DC IVF   Continue to monitor

## 2025-03-24 ENCOUNTER — TELEPHONE (OUTPATIENT)
Dept: UROLOGY | Facility: CLINIC | Age: 70
End: 2025-03-24
Payer: COMMERCIAL

## 2025-03-24 NOTE — TELEPHONE ENCOUNTER
Called patient and rescheduled appointment per request.        ----- Message from Norma sent at 3/24/2025  3:06 PM CDT -----  Contact: Mitch  .Patient is calling to speak with the nurse regarding appt . Reports wanting to reschedule all appts . Please give patient a call back at .[phone]

## 2025-03-25 ENCOUNTER — TELEPHONE (OUTPATIENT)
Dept: PULMONOLOGY | Facility: CLINIC | Age: 70
End: 2025-03-25
Payer: COMMERCIAL

## 2025-03-25 NOTE — TELEPHONE ENCOUNTER
----- Message from Norma sent at 3/24/2025  3:06 PM CDT -----  Contact: Mitch  .Patient is calling to speak with the nurse regarding appt . Reports wanting to reschedule all appts . Please give patient a call back at .[phone]

## 2025-03-26 ENCOUNTER — TELEPHONE (OUTPATIENT)
Dept: NEPHROLOGY | Facility: CLINIC | Age: 70
End: 2025-03-26
Payer: COMMERCIAL

## 2025-03-26 DIAGNOSIS — E11.21 TYPE II DIABETES MELLITUS WITH NEPHROPATHY: ICD-10-CM

## 2025-03-26 RX ORDER — INSULIN GLARGINE 100 [IU]/ML
40 INJECTION, SOLUTION SUBCUTANEOUS 2 TIMES DAILY
Qty: 30 ML | Refills: 11 | OUTPATIENT
Start: 2025-03-26

## 2025-03-26 RX ORDER — INSULIN ASPART 100 [IU]/ML
25 INJECTION, SOLUTION INTRAVENOUS; SUBCUTANEOUS
Qty: 30 ML | Refills: 0 | Status: SHIPPED | OUTPATIENT
Start: 2025-03-26

## 2025-03-26 NOTE — TELEPHONE ENCOUNTER
No care due was identified.  [facility] Embedded Care Due Messages. Reference number: [id number].   3/26/2025 8:12:03 AM CDT

## 2025-03-26 NOTE — TELEPHONE ENCOUNTER
----- Message from Hollie sent at 3/26/2025 10:56 AM CDT -----  .Name of Caller: Patient's daughterVishal Call Back Number:[phone] Information: She would like his home health nurse to do blood work if possible. Call Marybeth back at the number listed above to advise. Cornell FLORES  ----- Message -----  From: Hollie Trevino  Sent: 3/26/2025  10:58 AM CDT  To: [facility] Staff    .Name of Caller: Patient's daughterVishal Call Back Number:[phone] Information: She would like his home health nurse to do blood work if possible. Call Marybeth back at the number listed above to advise. Cornell EL

## 2025-03-26 NOTE — TELEPHONE ENCOUNTER
No care due was identified.  [facility] Embedded Care Due Messages. Reference number: [id number].   3/26/2025 8:10:06 AM CDT

## 2025-03-26 NOTE — TELEPHONE ENCOUNTER
----- Message from Hollie sent at 3/26/2025 11:00 AM CDT -----  .Type:  Sooner Apoointment RequestCaller is requesting a sooner appointment.  Caller declined first available appointment listed below.  Caller will not accept being placed on the waitlist and is requesting a message be sent to doctor.Name of Caller:Patient's daughterVinnie is the first available appointment?05/23/2025Symptoms:hospital follow upWould the patient rather a call back or a response via [facility]? Call [facility] Call Back Number:[phone] Information:

## 2025-03-28 ENCOUNTER — DOCUMENTATION ONLY (OUTPATIENT)
Dept: INTERNAL MEDICINE | Facility: CLINIC | Age: 70
End: 2025-03-28
Payer: COMMERCIAL

## 2025-03-28 DIAGNOSIS — N18.9 ANEMIA ASSOCIATED WITH CHRONIC RENAL FAILURE: ICD-10-CM

## 2025-03-28 DIAGNOSIS — E11.649 UNCONTROLLED TYPE 2 DIABETES MELLITUS WITH HYPOGLYCEMIA WITHOUT COMA: ICD-10-CM

## 2025-03-28 DIAGNOSIS — D63.1 ANEMIA ASSOCIATED WITH CHRONIC RENAL FAILURE: ICD-10-CM

## 2025-03-28 RX ORDER — FERROUS SULFATE 325(65) MG
325 TABLET ORAL
Qty: 30 TABLET | Refills: 11 | Status: SHIPPED | OUTPATIENT
Start: 2025-03-28

## 2025-03-28 RX ORDER — INSULIN GLARGINE 100 [IU]/ML
15 INJECTION, SOLUTION SUBCUTANEOUS 2 TIMES DAILY
Qty: 9 ML | Refills: 0 | Status: SHIPPED | OUTPATIENT
Start: 2025-03-28 | End: 2025-04-27

## 2025-03-28 RX ORDER — INSULIN GLARGINE 100 [IU]/ML
35 INJECTION, SOLUTION SUBCUTANEOUS 2 TIMES DAILY
Qty: 30 ML | Refills: 0 | OUTPATIENT
Start: 2025-03-28

## 2025-03-28 NOTE — TELEPHONE ENCOUNTER
Care Due:                  Date            Visit Type   Department     Provider  --------------------------------------------------------------------------------                                EP -                              PRIMARY      [facility] INTER[facility]  Last Visit: 12-      CARE ([facility])   MEDICINE       Valery Caal  Next Visit: None Scheduled  None         None Found                                                            Last  Test          Frequency    Reason                     Performed    Due Date  --------------------------------------------------------------------------------    HBA1C.......  6 months...  insulin..................  12- 06-    [facility] Embedded Care Due Messages. Reference number: [id number].   3/28/2025 11:02:31 AM CDT

## 2025-03-28 NOTE — PROGRESS NOTES
Received secure chat from pharmacy requesting refill of lantus. Noted patient has not been seen for hospital follow up with care team. Given 1 refill of lantus and asked staff to schedule patient for hospital follow up.

## 2025-03-31 ENCOUNTER — OFFICE VISIT (OUTPATIENT)
Dept: INTERNAL MEDICINE | Facility: CLINIC | Age: 70
End: 2025-03-31
Payer: COMMERCIAL

## 2025-03-31 VITALS
SYSTOLIC BLOOD PRESSURE: 128 MMHG | BODY MASS INDEX: 34.77 KG/M2 | HEIGHT: 72 IN | DIASTOLIC BLOOD PRESSURE: 66 MMHG | HEART RATE: 88 BPM

## 2025-03-31 DIAGNOSIS — D64.9 ANEMIA, UNSPECIFIED TYPE: ICD-10-CM

## 2025-03-31 DIAGNOSIS — A04.72 C. DIFFICILE COLITIS: ICD-10-CM

## 2025-03-31 DIAGNOSIS — N17.9 AKI (ACUTE KIDNEY INJURY): Primary | ICD-10-CM

## 2025-03-31 NOTE — PROGRESS NOTES
Subjective:       Patient ID: Mitch Whittaker is a 69 y.o. male.    Chief Complaint: Hospital Follow Up    The patient location is: home  The chief complaint leading to consultation is: hospital follow up    Visit type: audiovisual    Face to Face time with patient:  7 minutes (chart review started 3:37 p.m.; video started 3:40 p.m.; video ended 3:47 p.m.)  16 minutes of total time spent on the encounter, which includes face to face time and non-face to face time preparing to see the patient (eg, review of tests), Obtaining and/or reviewing separately obtained history, Documenting clinical information in the electronic or other health record, Independently interpreting results (not separately reported) and communicating results to the patient/family/caregiver, or Care coordination (not separately reported).     Each patient to whom he or she provides medical services by telemedicine is:  (1) informed of the relationship between the physician and patient and the respective role of any other health care provider with respect to management of the patient; and (2) notified that he or she may decline to receive medical services by telemedicine and may withdraw from such care at any time.    Transitional Care Note    Family and/or Caretaker present at visit?  Yes.  Diagnostic tests reviewed/disposition: No diagnosic tests pending after this hospitalization.  Disease/illness education: see A/P  Home health/community services discussion/referrals: Patient has home health established at [facility] .   Establishment or re-establishment of referral orders for community resources: No other necessary community resources.   Discussion with other health care providers: No discussion with other health care providers necessary.     History of Present Illness    - Patient presents for a follow-up visit after a recent hospital discharge, with concerns about kidney function and ongoing medical management.  - Patient has  recently been discharged from the hospital and has been receiving home health care services, including therapy. He reports doing adequately since his discharge. He has ongoing concerns related to his kidney function, which was a significant issue during his hospitalization. He mentions having shoulder pain. His diarrhea, previously an issue, has been improving. He confirms no recurrence of hematuria since discharge. He is currently under the care of a nephrologist, for his kidney-related issues. He has an upcoming appointment with a diabetes specialist, Ms. Geller, scheduled for May. His last A1C test, performed 3 months ago, was reported as favorable by the healthcare provider.      Review of Systems   Constitutional:  Negative for activity change and unexpected weight change.   HENT:  Positive for hearing loss, rhinorrhea and trouble swallowing.    Eyes:  Negative for discharge and visual disturbance.   Respiratory:  Negative for chest tightness and wheezing.    Cardiovascular:  Negative for chest pain and palpitations.   Gastrointestinal:  Negative for blood in stool, constipation, diarrhea and vomiting.   Endocrine: Positive for polyuria. Negative for polydipsia.   Genitourinary:  Positive for urgency. Negative for difficulty urinating and hematuria.   Musculoskeletal:  Positive for arthralgias and neck pain.   Neurological:  Negative for weakness and headaches.   Psychiatric/Behavioral:  Negative for dysphoric mood.        Objective:      Physical Exam  Constitutional:       General: He is not in acute distress.     Appearance: He is well-developed.   HENT:      Head: Normocephalic and atraumatic.   Eyes:      General: Lids are normal. No scleral icterus.     Extraocular Movements: Extraocular movements intact.      Conjunctiva/sclera: Conjunctivae normal.   Pulmonary:      Effort: Pulmonary effort is normal.   Neurological:      Mental Status: He is alert and oriented to person, place, and time.   Psychiatric:          Mood and Affect: Mood and affect normal.         Assessment:       1. ANGELITO (acute kidney injury)    2. Anemia, unspecified type    3. C. difficile colitis        Plan:   1. ANGELITO (acute kidney injury)  -     Basic Metabolic Panel; Future; Expected date: 03/31/2025    2. Anemia, unspecified type  -     CBC Auto Differential; Future; Expected date: 03/31/2025    3. C. difficile colitis    Patient reports his diarrhea is resolving.    Discharge summary reviewed.  Will have home health check BMP and CBC for monitoring.  Advised to keep follow-up scheduled with specialist including Nephrology and diabetes management.  Advised okay for him to take Tylenol as needed for shoulder pain.    Health Maintenance reviewed/updated.    Follow up if symptoms worsen or fail to improve, for keep routine follow up.    This note was generated with the assistance of ambient listening technology. Verbal consent was obtained by the patient and accompanying visitor(s) for the recording of patient appointment to facilitate this note. I attest to having reviewed and edited the generated note for accuracy, though some syntax or spelling errors may persist. Please contact the author of this note for any clarification.       Retention Suture Text: Retention sutures were placed to support the closure and prevent dehiscence.

## 2025-04-01 ENCOUNTER — HOSPITAL ENCOUNTER (INPATIENT)
Facility: HOSPITAL | Age: 70
LOS: 10 days | Discharge: HOME HEALTH | DRG: 871 | End: 2025-04-11
Attending: EMERGENCY MEDICINE | Admitting: HOSPITALIST
Payer: COMMERCIAL

## 2025-04-01 ENCOUNTER — TELEPHONE (OUTPATIENT)
Dept: INTERNAL MEDICINE | Facility: CLINIC | Age: 70
End: 2025-04-01
Payer: COMMERCIAL

## 2025-04-01 DIAGNOSIS — R29.898 WEAKNESS OF BOTH LOWER EXTREMITIES: Chronic | ICD-10-CM

## 2025-04-01 DIAGNOSIS — E83.51 HYPOCALCEMIA: ICD-10-CM

## 2025-04-01 DIAGNOSIS — I47.20 V-TACH: ICD-10-CM

## 2025-04-01 DIAGNOSIS — Z94.0 HISTORY OF KIDNEY TRANSPLANT: ICD-10-CM

## 2025-04-01 DIAGNOSIS — R07.9 CHEST PAIN: ICD-10-CM

## 2025-04-01 DIAGNOSIS — Z13.6 SCREENING FOR CARDIOVASCULAR CONDITION: ICD-10-CM

## 2025-04-01 DIAGNOSIS — G93.40 ACUTE ENCEPHALOPATHY: ICD-10-CM

## 2025-04-01 DIAGNOSIS — E87.6 HYPOKALEMIA: ICD-10-CM

## 2025-04-01 DIAGNOSIS — D84.821 IMMUNOCOMPROMISED STATE DUE TO DRUG THERAPY: ICD-10-CM

## 2025-04-01 DIAGNOSIS — Z79.899 IMMUNOCOMPROMISED STATE DUE TO DRUG THERAPY: ICD-10-CM

## 2025-04-01 DIAGNOSIS — L03.116 CELLULITIS OF LEFT LEG: Primary | ICD-10-CM

## 2025-04-01 DIAGNOSIS — N18.9 CHRONIC RENAL IMPAIRMENT, UNSPECIFIED CKD STAGE: ICD-10-CM

## 2025-04-01 DIAGNOSIS — A41.9 SEPSIS, DUE TO UNSPECIFIED ORGANISM, UNSPECIFIED WHETHER ACUTE ORGAN DYSFUNCTION PRESENT: ICD-10-CM

## 2025-04-01 LAB
ABSOLUTE NEUTROPHIL MANUAL (OHS): 3.4 K/UL
ALBUMIN SERPL BCP-MCNC: 2.3 G/DL (ref 3.5–5.2)
ALP SERPL-CCNC: 96 UNIT/L (ref 40–150)
ALT SERPL W/O P-5'-P-CCNC: 14 UNIT/L (ref 10–44)
ANION GAP (OHS): 11 MMOL/L (ref 8–16)
ANISOCYTOSIS BLD QL SMEAR: SLIGHT
AST SERPL-CCNC: 19 UNIT/L (ref 11–45)
BACTERIA #/AREA URNS AUTO: NORMAL /HPF
BILIRUB SERPL-MCNC: 1.1 MG/DL (ref 0.1–1)
BILIRUB UR QL STRIP.AUTO: NEGATIVE
BUN SERPL-MCNC: 27 MG/DL (ref 8–23)
CALCIUM SERPL-MCNC: 8.1 MG/DL (ref 8.7–10.5)
CHLORIDE SERPL-SCNC: 104 MMOL/L (ref 95–110)
CLARITY UR: CLEAR
CO2 SERPL-SCNC: 27 MMOL/L (ref 23–29)
COLOR UR AUTO: YELLOW
CREAT SERPL-MCNC: 1.9 MG/DL (ref 0.5–1.4)
ERYTHROCYTE [DISTWIDTH] IN BLOOD BY AUTOMATED COUNT: 17.8 % (ref 11.5–14.5)
GFR SERPLBLD CREATININE-BSD FMLA CKD-EPI: 37 ML/MIN/1.73/M2
GLUCOSE SERPL-MCNC: 61 MG/DL (ref 70–110)
GLUCOSE UR QL STRIP: ABNORMAL
HCT VFR BLD AUTO: 31.8 % (ref 40–54)
HGB BLD-MCNC: 9.6 GM/DL (ref 14–18)
HGB UR QL STRIP: ABNORMAL
HYALINE CASTS UR QL AUTO: 0 /LPF (ref 0–1)
KETONES UR QL STRIP: NEGATIVE
LACTATE SERPL-SCNC: 0.7 MMOL/L (ref 0.5–2.2)
LACTATE SERPL-SCNC: 1 MMOL/L (ref 0.5–2.2)
LARGE/GIANT PLATELETS (OHS): PRESENT
LEUKOCYTE ESTERASE UR QL STRIP: NEGATIVE
LYMPHOCYTES NFR BLD MANUAL: 20 % (ref 18–48)
MAGNESIUM SERPL-MCNC: 1.1 MG/DL (ref 1.6–2.6)
MCH RBC QN AUTO: 25.1 PG (ref 27–31)
MCHC RBC AUTO-ENTMCNC: 30.2 G/DL (ref 32–36)
MCV RBC AUTO: 83 FL (ref 82–98)
MICROSCOPIC COMMENT: NORMAL
MONOCYTES NFR BLD MANUAL: 10 % (ref 4–15)
NEUTROPHILS NFR BLD MANUAL: 70 % (ref 38–73)
NITRITE UR QL STRIP: NEGATIVE
NUCLEATED RBC (/100WBC) (OHS): 0 /100 WBC
OVALOCYTES BLD QL SMEAR: NORMAL
PH UR STRIP: 6 [PH]
PLATELET # BLD AUTO: 176 K/UL (ref 150–450)
PLATELET BLD QL SMEAR: NORMAL
PMV BLD AUTO: 11.4 FL (ref 9.2–12.9)
POCT GLUCOSE: 67 MG/DL (ref 70–110)
POIKILOCYTOSIS BLD QL SMEAR: SLIGHT
POTASSIUM SERPL-SCNC: 2.8 MMOL/L (ref 3.5–5.1)
PROCALCITONIN SERPL-MCNC: 1.6 NG/ML
PROT SERPL-MCNC: 5.9 GM/DL (ref 6–8.4)
PROT UR QL STRIP: ABNORMAL
RBC # BLD AUTO: 3.82 M/UL (ref 4.6–6.2)
RBC #/AREA URNS AUTO: 0 /HPF (ref 0–4)
SODIUM SERPL-SCNC: 142 MMOL/L (ref 136–145)
SP GR UR STRIP: 1.02
TROPONIN I SERPL DL<=0.01 NG/ML-MCNC: 0.15 NG/ML
UROBILINOGEN UR STRIP-ACNC: NEGATIVE EU/DL
WBC # BLD AUTO: 4.81 K/UL (ref 3.9–12.7)
WBC #/AREA URNS AUTO: 1 /HPF (ref 0–5)

## 2025-04-01 PROCEDURE — 84484 ASSAY OF TROPONIN QUANT: CPT | Performed by: HOSPITALIST

## 2025-04-01 PROCEDURE — 96361 HYDRATE IV INFUSION ADD-ON: CPT

## 2025-04-01 PROCEDURE — 84484 ASSAY OF TROPONIN QUANT: CPT | Performed by: EMERGENCY MEDICINE

## 2025-04-01 PROCEDURE — 93005 ELECTROCARDIOGRAM TRACING: CPT

## 2025-04-01 PROCEDURE — 21400001 HC TELEMETRY ROOM

## 2025-04-01 PROCEDURE — 36415 COLL VENOUS BLD VENIPUNCTURE: CPT | Performed by: EMERGENCY MEDICINE

## 2025-04-01 PROCEDURE — 87040 BLOOD CULTURE FOR BACTERIA: CPT | Performed by: EMERGENCY MEDICINE

## 2025-04-01 PROCEDURE — 83735 ASSAY OF MAGNESIUM: CPT | Performed by: NURSE PRACTITIONER

## 2025-04-01 PROCEDURE — 82962 GLUCOSE BLOOD TEST: CPT

## 2025-04-01 PROCEDURE — 85025 COMPLETE CBC W/AUTO DIFF WBC: CPT | Performed by: EMERGENCY MEDICINE

## 2025-04-01 PROCEDURE — 93010 ELECTROCARDIOGRAM REPORT: CPT | Mod: ,,, | Performed by: INTERNAL MEDICINE

## 2025-04-01 PROCEDURE — 63600175 PHARM REV CODE 636 W HCPCS: Performed by: EMERGENCY MEDICINE

## 2025-04-01 PROCEDURE — 96375 TX/PRO/DX INJ NEW DRUG ADDON: CPT

## 2025-04-01 PROCEDURE — 96365 THER/PROPH/DIAG IV INF INIT: CPT

## 2025-04-01 PROCEDURE — 84145 PROCALCITONIN (PCT): CPT | Performed by: EMERGENCY MEDICINE

## 2025-04-01 PROCEDURE — 25000003 PHARM REV CODE 250: Performed by: EMERGENCY MEDICINE

## 2025-04-01 PROCEDURE — 36415 COLL VENOUS BLD VENIPUNCTURE: CPT | Performed by: NURSE PRACTITIONER

## 2025-04-01 PROCEDURE — 99285 EMERGENCY DEPT VISIT HI MDM: CPT | Mod: 25

## 2025-04-01 PROCEDURE — 82310 ASSAY OF CALCIUM: CPT | Performed by: NURSE PRACTITIONER

## 2025-04-01 PROCEDURE — 81001 URINALYSIS AUTO W/SCOPE: CPT | Performed by: EMERGENCY MEDICINE

## 2025-04-01 PROCEDURE — 82040 ASSAY OF SERUM ALBUMIN: CPT | Performed by: EMERGENCY MEDICINE

## 2025-04-01 PROCEDURE — 83605 ASSAY OF LACTIC ACID: CPT | Performed by: EMERGENCY MEDICINE

## 2025-04-01 RX ORDER — CEFEPIME HYDROCHLORIDE 2 G/1
2 INJECTION, POWDER, FOR SOLUTION INTRAVENOUS
Status: COMPLETED | OUTPATIENT
Start: 2025-04-01 | End: 2025-04-01

## 2025-04-01 RX ORDER — PROMETHAZINE HYDROCHLORIDE 25 MG/1
25 TABLET ORAL EVERY 6 HOURS PRN
Status: DISCONTINUED | OUTPATIENT
Start: 2025-04-01 | End: 2025-04-11 | Stop reason: HOSPADM

## 2025-04-01 RX ORDER — HYDROCODONE BITARTRATE AND ACETAMINOPHEN 5; 325 MG/1; MG/1
1 TABLET ORAL EVERY 6 HOURS PRN
Refills: 0 | Status: DISCONTINUED | OUTPATIENT
Start: 2025-04-01 | End: 2025-04-11 | Stop reason: HOSPADM

## 2025-04-01 RX ORDER — SODIUM CHLORIDE 0.9 % (FLUSH) 0.9 %
3 SYRINGE (ML) INJECTION EVERY 12 HOURS PRN
Status: DISCONTINUED | OUTPATIENT
Start: 2025-04-01 | End: 2025-04-11 | Stop reason: HOSPADM

## 2025-04-01 RX ORDER — ACETAMINOPHEN 325 MG/1
650 TABLET ORAL EVERY 6 HOURS PRN
Status: DISCONTINUED | OUTPATIENT
Start: 2025-04-01 | End: 2025-04-11 | Stop reason: HOSPADM

## 2025-04-01 RX ORDER — INSULIN ASPART 100 [IU]/ML
0-5 INJECTION, SOLUTION INTRAVENOUS; SUBCUTANEOUS
Status: DISCONTINUED | OUTPATIENT
Start: 2025-04-01 | End: 2025-04-11 | Stop reason: HOSPADM

## 2025-04-01 RX ORDER — ACETAMINOPHEN 650 MG/1
650 SUPPOSITORY RECTAL
Status: COMPLETED | OUTPATIENT
Start: 2025-04-01 | End: 2025-04-01

## 2025-04-01 RX ORDER — DEXTROSE 50 % IN WATER (D50W) INTRAVENOUS SYRINGE
25
Status: COMPLETED | OUTPATIENT
Start: 2025-04-01 | End: 2025-04-01

## 2025-04-01 RX ORDER — ONDANSETRON HYDROCHLORIDE 2 MG/ML
4 INJECTION, SOLUTION INTRAVENOUS EVERY 8 HOURS PRN
Status: DISCONTINUED | OUTPATIENT
Start: 2025-04-01 | End: 2025-04-11 | Stop reason: HOSPADM

## 2025-04-01 RX ORDER — NALOXONE HCL 0.4 MG/ML
0.02 VIAL (ML) INJECTION
Status: DISCONTINUED | OUTPATIENT
Start: 2025-04-01 | End: 2025-04-11 | Stop reason: HOSPADM

## 2025-04-01 RX ORDER — MORPHINE SULFATE 4 MG/ML
2 INJECTION, SOLUTION INTRAMUSCULAR; INTRAVENOUS EVERY 4 HOURS PRN
Refills: 0 | Status: DISCONTINUED | OUTPATIENT
Start: 2025-04-01 | End: 2025-04-07

## 2025-04-01 RX ORDER — GLUCAGON 1 MG
1 KIT INJECTION
Status: DISCONTINUED | OUTPATIENT
Start: 2025-04-01 | End: 2025-04-11 | Stop reason: HOSPADM

## 2025-04-01 RX ORDER — ACETAMINOPHEN 325 MG/1
650 TABLET ORAL
Status: DISCONTINUED | OUTPATIENT
Start: 2025-04-01 | End: 2025-04-01

## 2025-04-01 RX ORDER — POLYETHYLENE GLYCOL 3350 17 G/17G
17 POWDER, FOR SOLUTION ORAL DAILY PRN
Status: DISCONTINUED | OUTPATIENT
Start: 2025-04-01 | End: 2025-04-11 | Stop reason: HOSPADM

## 2025-04-01 RX ORDER — ACETAMINOPHEN 650 MG/1
650 SUPPOSITORY RECTAL EVERY 6 HOURS PRN
Status: DISCONTINUED | OUTPATIENT
Start: 2025-04-01 | End: 2025-04-11 | Stop reason: HOSPADM

## 2025-04-01 RX ORDER — CEFEPIME HYDROCHLORIDE 2 G/1
2 INJECTION, POWDER, FOR SOLUTION INTRAVENOUS
Status: DISCONTINUED | OUTPATIENT
Start: 2025-04-02 | End: 2025-04-05

## 2025-04-01 RX ORDER — TALC
6 POWDER (GRAM) TOPICAL NIGHTLY PRN
Status: DISCONTINUED | OUTPATIENT
Start: 2025-04-01 | End: 2025-04-11 | Stop reason: HOSPADM

## 2025-04-01 RX ORDER — ALUMINUM HYDROXIDE, MAGNESIUM HYDROXIDE, AND SIMETHICONE 1200; 120; 1200 MG/30ML; MG/30ML; MG/30ML
30 SUSPENSION ORAL 4 TIMES DAILY PRN
Status: DISCONTINUED | OUTPATIENT
Start: 2025-04-01 | End: 2025-04-07

## 2025-04-01 RX ORDER — POTASSIUM CHLORIDE 20 MEQ/1
40 TABLET, EXTENDED RELEASE ORAL
Status: COMPLETED | OUTPATIENT
Start: 2025-04-01 | End: 2025-04-01

## 2025-04-01 RX ORDER — MAGNESIUM SULFATE HEPTAHYDRATE 40 MG/ML
2 INJECTION, SOLUTION INTRAVENOUS ONCE
Status: COMPLETED | OUTPATIENT
Start: 2025-04-01 | End: 2025-04-02

## 2025-04-01 RX ORDER — IBUPROFEN 200 MG
16 TABLET ORAL
Status: DISCONTINUED | OUTPATIENT
Start: 2025-04-01 | End: 2025-04-11 | Stop reason: HOSPADM

## 2025-04-01 RX ORDER — SIMETHICONE 80 MG
1 TABLET,CHEWABLE ORAL 4 TIMES DAILY PRN
Status: DISCONTINUED | OUTPATIENT
Start: 2025-04-01 | End: 2025-04-11 | Stop reason: HOSPADM

## 2025-04-01 RX ORDER — IBUPROFEN 200 MG
24 TABLET ORAL
Status: DISCONTINUED | OUTPATIENT
Start: 2025-04-01 | End: 2025-04-11 | Stop reason: HOSPADM

## 2025-04-01 RX ORDER — POTASSIUM CHLORIDE 20 MEQ/1
40 TABLET, EXTENDED RELEASE ORAL ONCE
Status: COMPLETED | OUTPATIENT
Start: 2025-04-01 | End: 2025-04-02

## 2025-04-01 RX ORDER — CALCIUM GLUCONATE 20 MG/ML
1 INJECTION, SOLUTION INTRAVENOUS
Status: COMPLETED | OUTPATIENT
Start: 2025-04-01 | End: 2025-04-01

## 2025-04-01 RX ADMIN — DEXTROSE MONOHYDRATE 25 G: 25 INJECTION, SOLUTION INTRAVENOUS at 06:04

## 2025-04-01 RX ADMIN — SODIUM CHLORIDE 1000 ML: 9 INJECTION, SOLUTION INTRAVENOUS at 06:04

## 2025-04-01 RX ADMIN — ACETAMINOPHEN 650 MG: 650 SUPPOSITORY RECTAL at 06:04

## 2025-04-01 RX ADMIN — POTASSIUM CHLORIDE 40 MEQ: 1500 TABLET, EXTENDED RELEASE ORAL at 07:04

## 2025-04-01 RX ADMIN — CEFEPIME 2 G: 2 INJECTION, POWDER, FOR SOLUTION INTRAVENOUS at 06:04

## 2025-04-01 RX ADMIN — CALCIUM GLUCONATE 1 G: 20 INJECTION, SOLUTION INTRAVENOUS at 07:04

## 2025-04-01 NOTE — TELEPHONE ENCOUNTER
FYI:   Home health nurse called and said the patient was having labored breathing , BP elevated, and he doesn't feel well at all. Pt has also been co. Nausea and vomiting. Advised HH nurse that patient needs to go back to the ER especially if his breathing is labored   Nurse and family verbalized understanding

## 2025-04-01 NOTE — ED TRIAGE NOTES
Mitch Whittaker, a 69 y.o. male presents to the ED w/ complaint of left leg pain. Pt's wife reports increase in pain for two days. Denies any trauma or injury.    Triage note:  Chief Complaint   Patient presents with    Leg Pain     Pt presented with complaints of pain to left leg. Denies injury or trauma. Pt states he's bedbound.      Review of patient's allergies indicates:   Allergen Reactions    Lisinopril Other (See Comments)     Other reaction(s):  cough    Actos  [pioglitazone] Other (See Comments)     Other reaction(s): CHF    Metformin Other (See Comments)     Other reaction(s): renal insuff  Other reaction(s): CHF     Past Medical History:   Diagnosis Date    Acquired renal cyst of left kidney     Anemia associated with chronic renal failure     CAD (coronary artery disease)     nonobstructive c 9/14    CHF (congestive heart failure)     Chronic immunosuppression with Prograf and MMF 06/18/2015    Chronic venous insufficiency of lower extremity     CKD (chronic kidney disease) stage 3, GFR 30-59 ml/min     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 RNA NEGATIVE 5/2015, 2021, 2022    History of colon polyps     HPTH (hyperparathyroidism)     Hyperlipidemia     Hypertension associated with stage 3 chronic kidney disease due to type 2 diabetes mellitus     LBBB (left bundle branch block) 12/20/2021    Morbid obesity with BMI of 45.0-49.9, adult     Nephrolithiasis 6/7/2013    PCO (posterior capsular opacification), left 03/04/2019    Proteinuria     resolved s/p transplant    S/P kidney transplant     Sleep apnea     Type 2 diabetes, uncontrolled, with retinopathy      Type II diabetes mellitus with renal manifestations

## 2025-04-01 NOTE — ED PROVIDER NOTES
SCRIBE #1 NOTE: I, Joana Azevedo, am scribing for, and in the presence of, Carlos Eduardo Paredes Jr., MD. I have scribed the entire note.       History     Chief Complaint   Patient presents with    Leg Pain     Pt presented with complaints of pain to left leg. Denies injury or trauma. Pt states he's bedbound.      Review of patient's allergies indicates:   Allergen Reactions    Lisinopril Other (See Comments)     Other reaction(s):  cough    Actos  [pioglitazone] Other (See Comments)     Other reaction(s): CHF    Metformin Other (See Comments)     Other reaction(s): renal insuff  Other reaction(s): CHF         History of Present Illness     HPI    4/1/2025, 5:40 PM  History obtained from the patient      History of Present Illness: Mitch Whittaker is a 69 y.o. male patient with a PMHx of T2DM, HTN, CKD, diabetic retinopathy, CAD, HLD, CHF, and chronic venous insufficiency of lower extremity who presents to the Emergency Department for evaluation of LLE pain which began last night. Pt reports that he is bed bound. Symptoms are constant and moderate in severity. No mitigating or exacerbating factors reported. Associated sxs include cough and leg swelling. Patient denies any LLE color change, fever, dysuria, NVD, or congestion. No prior Tx specified. Pt denies any injury or trauma to LLE. No further complaints or concerns at this time.       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 RNA NEGATIVE 5/2015, 2021, 2022    History of colon polyps     HPTH (hyperparathyroidism)     Hyperlipidemia     Hypertension associated with stage 3 chronic kidney disease due to type 2 diabetes mellitus     LBBB (left bundle branch block) 12/20/2021    Morbid obesity with BMI of 45.0-49.9, adult     Nephrolithiasis 6/7/2013    PCO (posterior capsular opacification), left 03/04/2019    Proteinuria     resolved s/p transplant    S/P kidney transplant     Sleep apnea     Type 2 diabetes, uncontrolled, with retinopathy     Type II diabetes mellitus with renal manifestations        Past Surgical History:  Past Surgical History:   Procedure Laterality Date    CARDIAC CATHETERIZATION  01/01/2008    normal coronary    CARPAL TUNNEL RELEASE Right 12/01/2023    Procedure: RELEASE, CARPAL TUNNEL;  Surgeon: Noel Almonte MD;  Location: [facility] OR;  Service: Orthopedics;  Laterality: Right;    CARPAL TUNNEL RELEASE Left 7/18/2024    Procedure: RELEASE, CARPAL TUNNEL;  Surgeon: Noel Almonte MD;  Location: [facility] OR;  Service: Orthopedics;  Laterality: Left;    COLONOSCOPY N/A 04/05/2018    Procedure: COLONOSCOPY;  Surgeon: Chava Ronquillo MD;  Location: [facility] ENDO;  Service: Endoscopy;  Laterality: N/A;    COLONOSCOPY N/A 05/02/2022    Procedure: COLONOSCOPY;  Surgeon: Alix Puente MD;  Location: [facility] ENDO;  Service: Endoscopy;  Laterality: N/A;    COLONOSCOPY N/A 06/07/2023    Procedure: COLONOSCOPY - rule out CMV  Cardiac clearance/Eliquis hold approval received on 05/21/23 per Dr. Meade, cardiology.  Note in encounters.  LB;  Surgeon: Daniella Shah MD;  Location: [facility] ENDO;  Service: Endoscopy;   Laterality: N/A;    ESOPHAGOGASTRODUODENOSCOPY N/A 2024    Procedure: EGD (ESOPHAGOGASTRODUODENOSCOPY) 3/1-pt cleared, ok to hold eliquis for 3 days;  Surgeon: Daniella Shah MD;  Location: [facility];  Service: Endoscopy;  Laterality: N/A;    KIDNEY TRANSPLANT  2015    RETINAL LASER PROCEDURE           Family History:  Family History   Problem Relation Name Age of Onset    Diabetes Mother      Hypertension Mother      Heart failure Mother      Heart failure Father      Kidney disease Sister          ESRD    Diabetes Sister      Diabetes Maternal Grandmother      Cancer Neg Hx         Social History:  Social History     Tobacco Use    Smoking status: Former     Current packs/day: 0.00     Types: Cigarettes     Quit date: 2013     Years since quittin.8     Passive exposure: Past    Smokeless tobacco: Former     Quit date: 2013    Tobacco comments:     used marijuana since 3993-3425, stopped after started dialysis   Substance and Sexual Activity    Alcohol use: No     Alcohol/week: 0.0 standard drinks of alcohol    Drug use: Not Currently     Comment:      Sexual activity: Never        Review of Systems     Review of Systems   Constitutional:  Negative for fever.   HENT:  Negative for congestion and sore throat.    Respiratory:  Positive for cough. Negative for shortness of breath.    Cardiovascular:  Positive for leg swelling. Negative for chest pain.   Gastrointestinal:  Negative for diarrhea, nausea and vomiting.   Genitourinary:  Negative for dysuria.   Musculoskeletal:  Positive for myalgias (LLE). Negative for back pain.   Skin:  Negative for color change (LLE) and rash.   Neurological:  Negative for weakness.   Hematological:  Does not bruise/bleed easily.   All other systems reviewed and are negative.     Physical Exam     Initial Vitals [25 1737]   BP Pulse Resp Temp SpO2   (!) 157/73 106 18 (!) 103 °F (39.4 °C) 96 %      MAP       --          Physical Exam  Nursing Notes and  Vital Signs Reviewed.  Constitutional: Patient is in no acute distress. Well-developed and well-nourished.  Head: Atraumatic. Normocephalic.  Eyes:  EOM intact.  No scleral icterus.  ENT: Mucous membranes are moist.  Nares clear   Neck:  Full ROM. No JVD.  Cardiovascular: Regular rate. Regular rhythm No murmurs, rubs, or gallops. Distal pulses are 2+ and symmetric  Pulmonary/Chest: No respiratory distress. Clear to auscultation bilaterally. No wheezing or rales.  Equal chest wall rise bilaterally  Abdominal: Soft and non-distended.  There is no tenderness.  No rebound, guarding, or rigidity. Good bowel sounds.  Genitourinary: No CVA tenderness.  No suprapubic tenderness  Musculoskeletal: Moves all extremities. No obvious deformities.  5 x 5 strength in all extremities   Skin: Warm and dry.  That has redness to the lower extremities bilaterally with tenderness.  These are excoriated consistent with cellulitis.  That has no abscess or crepitus  Neurological:  Alert, awake, and appropriate.  Normal speech.  No acute focal neurological deficits are appreciated.  Two through 12 intact bilaterally.  Psychiatric: Normal affect. Good eye contact. Appropriate in content.    ED Course   Critical Care    Date/Time: 4/1/2025 8:36 PM    Performed by: Carlos Eduardo Paredes Jr., MD  Authorized by: Carlos Eduardo Paredes Jr., MD  Direct patient critical care time: 10 minutes  Additional history critical care time: 8 minutes  Ordering / reviewing critical care time: 11 minutes  Documentation critical care time: 12 minutes  Consulting other physicians critical care time: 5 minutes  Consult with family critical care time: 7 minutes  Total critical care time (exclusive of procedural time) : 53 minutes  Critical care time was exclusive of teaching time and separately billable procedures and treating other patients.  Critical care was necessary to treat or prevent imminent or life-threatening deterioration of the following conditions:  sepsis.  Critical care was time spent personally by me on the following activities: development of treatment plan with patient or surrogate, discussions with consultants, interpretation of cardiac output measurements, evaluation of patient's response to treatment, examination of patient, obtaining history from patient or surrogate, ordering and performing treatments and interventions, ordering and review of laboratory studies, ordering and review of radiographic studies, pulse oximetry, re-evaluation of patient's condition and review of old charts.        ED Vital Signs:  Vitals:    04/01/25 1737 04/01/25 1849 04/01/25 1856 04/01/25 1900   BP: (!) 157/73      Pulse: 106   (!) 119   Resp: 18      Temp: (!) 103 °F (39.4 °C)  100 °F (37.8 °C)    TempSrc: Oral  Oral    SpO2: 96%      Weight:  102.3 kg (225 lb 8.5 oz)     Height:        04/01/25 1932 04/01/25 1945 04/01/25 2000 04/01/25 2030   BP: (!) 146/62 (!) 146/62 (!) 146/64 (!) 126/58   Pulse: 110 (!) 115 (!) 112 (!) 116   Resp: (!) 33 (!) 33 (!) 33 (!) 33   Temp: 100 °F (37.8 °C)  100 °F (37.8 °C) 99.7 °F (37.6 °C)   TempSrc:   Oral Oral   SpO2:  96% 97% 97%   Weight:   102.6 kg (226 lb 4.1 oz)    Height:   6' (1.829 m)        Abnormal Lab Results:  Labs Reviewed   COMPREHENSIVE METABOLIC PANEL - Abnormal       Result Value    Sodium 142      Potassium 2.8 (*)     Chloride 104      CO2 27      Glucose 61 (*)     BUN 27 (*)     Creatinine 1.9 (*)     Calcium 8.1 (*)     Protein Total 5.9 (*)     Albumin 2.3 (*)     Bilirubin Total 1.1 (*)     ALP 96      AST 19      ALT 14      Anion Gap 11      eGFR 37 (*)    URINALYSIS, REFLEX TO URINE CULTURE - Abnormal    Color, UA Yellow      Appearance, UA Clear      pH, UA 6.0      Spec Grav UA 1.020      Protein, UA 1+ (*)     Glucose, UA 1+ (*)     Ketones, UA Negative      Bilirubin, UA Negative      Blood, UA Trace (*)     Nitrites, UA Negative      Urobilinogen, UA Negative      Leukocyte Esterase, UA Negative      TROPONIN I - Abnormal    Troponin-I 0.152 (*)    PROCALCITONIN - Abnormal    Procalcitonin 1.60 (*)    CBC WITH DIFFERENTIAL - Abnormal    WBC 4.81      RBC 3.82 (*)     HGB 9.6 (*)     HCT 31.8 (*)     MCV 83      MCH 25.1 (*)     MCHC 30.2 (*)     RDW 17.8 (*)     Platelet Count 176      MPV 11.4      Nucleated RBC 0     POCT GLUCOSE - Abnormal    POCT Glucose 67 (*)    LACTIC ACID, PLASMA - Normal    Lactic Acid Level 1.0      Narrative:     Falsely low lactic acid results can be found in samples containing >=13.0 mg/dL total bilirubin and/or >=3.5 mg/dL direct bilirubin.    CULTURE, BLOOD   CULTURE, BLOOD   CBC W/ AUTO DIFFERENTIAL    Narrative:     The following orders were created for panel order CBC auto differential.  Procedure                               Abnormality         Status                     ---------                               -----------         ------                     CBC with Differential[[id number]]       Abnormal            Final result               Manual Differential[[id number]]                             Final result                 Please view results for these tests on the individual orders.   MANUAL DIFFERENTIAL    Gran # (ANC) 3.4      Segmented Neutrophil % 70.0      Lymphocyte % 20.0      Monocyte % 10.0      Platelet Estimate Appears Normal      Anisocytosis Slight      Poik Slight      Ovalocytes Occasional      Large/Giant Platelets Present     URINALYSIS MICROSCOPIC    RBC, UA 0      WBC, UA 1      Bacteria, UA Rare      Hyaline Casts, UA 0      Microscopic Comment       LACTIC ACID, PLASMA   MAGNESIUM   POCT GLUCOSE MONITORING CONTINUOUS        All Lab Results:  Results for orders placed or performed during the hospital encounter of 04/01/25   POCT glucose    Collection Time: 04/01/25  5:47 PM   Result Value Ref Range    POCT Glucose 67 (L) 70 - 110 mg/dL   Comprehensive metabolic panel    Collection Time: 04/01/25  6:04 PM   Result Value Ref Range    Sodium 142 136  - 145 mmol/L    Potassium 2.8 (L) 3.5 - 5.1 mmol/L    Chloride 104 95 - 110 mmol/L    CO2 27 23 - 29 mmol/L    Glucose 61 (L) 70 - 110 mg/dL    BUN 27 (H) 8 - 23 mg/dL    Creatinine 1.9 (H) 0.5 - 1.4 mg/dL    Calcium 8.1 (L) 8.7 - 10.5 mg/dL    Protein Total 5.9 (L) 6.0 - 8.4 gm/dL    Albumin 2.3 (L) 3.5 - 5.2 g/dL    Bilirubin Total 1.1 (H) 0.1 - 1.0 mg/dL    ALP 96 40 - 150 unit/L    AST 19 11 - 45 unit/L    ALT 14 10 - 44 unit/L    Anion Gap 11 8 - 16 mmol/L    eGFR 37 (L) >60 mL/min/1.73/m2   Lactic acid, plasma #1    Collection Time: 04/01/25  6:04 PM   Result Value Ref Range    Lactic Acid Level 1.0 0.5 - 2.2 mmol/L   Troponin I    Collection Time: 04/01/25  6:04 PM   Result Value Ref Range    Troponin-I 0.152 (H) <=0.026 ng/mL   Procalcitonin    Collection Time: 04/01/25  6:04 PM   Result Value Ref Range    Procalcitonin 1.60 (H) <0.25 ng/mL   CBC with Differential    Collection Time: 04/01/25  6:04 PM   Result Value Ref Range    WBC 4.81 3.90 - 12.70 K/uL    RBC 3.82 (L) 4.60 - 6.20 M/uL    HGB 9.6 (L) 14.0 - 18.0 gm/dL    HCT 31.8 (L) 40.0 - 54.0 %    MCV 83 82 - 98 fL    MCH 25.1 (L) 27.0 - 31.0 pg    MCHC 30.2 (L) 32.0 - 36.0 g/dL    RDW 17.8 (H) 11.5 - 14.5 %    Platelet Count 176 150 - 450 K/uL    MPV 11.4 9.2 - 12.9 fL    Nucleated RBC 0 <=0 /100 WBC   Manual Differential    Collection Time: 04/01/25  6:04 PM   Result Value Ref Range    Gran # (ANC) 3.4 K/uL    Segmented Neutrophil % 70.0 38.0 - 73.0 %    Lymphocyte % 20.0 18.0 - 48.0 %    Monocyte % 10.0 4.0 - 15.0 %    Platelet Estimate Appears Normal      Anisocytosis Slight     Poik Slight     Ovalocytes Occasional     Large/Giant Platelets Present    Urinalysis, Reflex to Urine Culture    Collection Time: 04/01/25  7:09 PM    Specimen: Urine   Result Value Ref Range    Color, UA Yellow Straw, Nikki, Yellow, Light-Orange    Appearance, UA Clear Clear    pH, UA 6.0 5.0 - 8.0    Spec Grav UA 1.020 1.005 - 1.030    Protein, UA 1+ (A) Negative     Glucose, UA 1+ (A) Negative    Ketones, UA Negative Negative    Bilirubin, UA Negative Negative    Blood, UA Trace (A) Negative    Nitrites, UA Negative Negative    Urobilinogen, UA Negative <2.0 EU/dL    Leukocyte Esterase, UA Negative Negative   Urinalysis Microscopic    Collection Time: 04/01/25  7:09 PM   Result Value Ref Range    RBC, UA 0 0 - 4 /HPF    WBC, UA 1 0 - 5 /HPF    Bacteria, UA Rare None, Rare, Occasional /HPF    Hyaline Casts, UA 0 0 - 1 /LPF    Microscopic Comment       *Note: Due to a large number of results and/or encounters for the requested time period, some results have not been displayed. A complete set of results can be found in Results Review.        Imaging Results:  Imaging Results              X-Ray Chest AP Portable (Final result)  Result time 04/01/25 19:26:29      Final result by Raudel Mathew DO (04/01/25 19:26:29)                   Impression:    No acute cardiopulmonary disease.    Finalized on: 4/1/2025 7:26 PM By:  Raudel Mathew  [facility]# [id number]      2025-04-01 19:28:36.851     [facility]               Narrative:    Exam: XR CHEST AP PORTABLE    Comparison: 03/13/2025    Clinical Indication:  Sepsis    Findings: Heart size and pulmonary vasculature are unremarkable.  No focal consolidation, pleural effusions or pneumothorax.    No acute angulated or displaced fractures. Stent in the subclavian artery on the right.                                         The EKG was ordered, reviewed, and independently interpreted by the ED provider.  Interpretation time: 18:12  Rate: 110 BPM  Rhythm: sinus tachycardia  Interpretation: Premature supraventricular complexes and with occasional premature ventricular complexes. Rightward axis. Nonspecific intraventricular block. No STEMI.         The Emergency Provider reviewed the vital signs and test results, which are outlined above.     ED Discussion     8:24 PM: Discussed case with Bernardo Coa MD ([facility]). Dr. Cao agrees with  current care and management of pt and accepts admission.   Admitting Service: Hospital Medicine  Admitting Physician: Dr. Cao  Admit to: Inpatient. Med/tele    8:24 PM: Re-evaluated pt. I have discussed test results, shared treatment plan, and the need for admission with patient/family/caretaker at bedside. Pt and/or family/caretaker express understanding at this time and agree with all information. All questions answered. Pt/caretaker/family member(s) have no further questions or concerns at this time. Pt is ready for admit.        ED Course as of 04/01/25 2041   Tue Apr 01, 2025   1738 Sepsis fluids initially held secondary to the patient having a kidney transplant and history of congestive heart failure [RT]   1915 Sepsis perfusion exam not performed due to history of congestive heart failure and stable vitals. [RT]      ED Course User Index  [RT] Carlos Eduardo Paredes Jr., MD     Medical Decision Making  Differential diagnosis:  Sepsis, severe sepsis, septic shock, cellulitis, pneumonia, UTI    Patient is evaluated history physical examination.  Patient meets SIRS criteria with the likely source of being has lower extremity cellulitis.  Has a immunocompromised secondary to medications for his kidney transplant.  Does have a history of renal insufficiency however his creatinine in his improved today.  Has a evidence of end-organ damage with the elevated troponin however.  He is not having chest pain.  Electrolytes were replenished.  Antibiotics given.  We held sepsis fluids due to his history of renal failure kidney transplant and CHF.  Patient's vital signs are stable.  Discussed all findings with the patient in his family.  They verbalized understanding and agreement with the plan of care    Amount and/or Complexity of Data Reviewed  Independent Historian: spouse, friend and EMS  External Data Reviewed: labs.     Details: Baseline labs reviewed  Labs: ordered. Decision-making details documented in ED Course.      Details: UA negative CBC shows a 4.8 white count and hemoglobin 9.6.  Platelet with a 176.  Procalcitonin elevated 1.6 with the elevated troponin 0.152.  He is hypokalemic with a potassium 2.8 hypocalcemic.  That has renal function shows a BUN at 27 and creatinine of 1.9 however this is improved from previous with a normal lactate.  Radiology: ordered. Decision-making details documented in ED Course.     Details: Chest x-ray shows no acute findings  ECG/medicine tests: ordered and independent interpretation performed. Decision-making details documented in ED Course.     Details: No STEMI  Discussion of management or test interpretation with external provider(s): Discussed the case with hospital medicine graciously accepts for admission    Risk  OTC drugs.  Prescription drug management.  Decision regarding hospitalization.  Diagnosis or treatment significantly limited by social determinants of health.  Risk Details: Social determinants: Patient with history of kidney transplant    Critical Care  Total time providing critical care: 53 minutes                ED Medication(s):  Medications   sodium chloride 0.9% flush 3 mL (has no administration in time range)   melatonin tablet 6 mg (has no administration in time range)   ondansetron injection 4 mg (has no administration in time range)   promethazine tablet 25 mg (has no administration in time range)   polyethylene glycol packet 17 g (has no administration in time range)   acetaminophen tablet 650 mg (has no administration in time range)   simethicone chewable tablet 80 mg (has no administration in time range)   aluminum-magnesium hydroxide-simethicone 200-200-20 mg/5 mL suspension 30 mL (has no administration in time range)   acetaminophen suppository 650 mg (has no administration in time range)   HYDROcodone-acetaminophen 5-325 mg per tablet 1 tablet (has no administration in time range)   morphine injection 2 mg (has no administration in time range)   naloxone 0.4  mg/mL injection 0.02 mg (has no administration in time range)   glucose chewable tablet 16 g (has no administration in time range)   glucose chewable tablet 24 g (has no administration in time range)   dextrose 50% injection 12.5 g (has no administration in time range)   dextrose 50% injection 25 g (has no administration in time range)   glucagon (human recombinant) injection 1 mg (has no administration in time range)   insulin aspart U-100 pen 0-5 Units (has no administration in time range)   ceFEPIme injection 2 g (has no administration in time range)   ceFEPIme injection 2 g (2 g Intravenous Given 4/1/25 1827)   sodium chloride 0.9% bolus 1,000 mL 1,000 mL (0 mLs Intravenous Stopped 4/1/25 1943)   dextrose 50 % in water (D50W) injection 25 g (25 g Intravenous Given 4/1/25 1806)   acetaminophen suppository 650 mg (650 mg Rectal Given 4/1/25 1832)   potassium chloride SA CR tablet 40 mEq (40 mEq Oral Given 4/1/25 1940)   calcium gluconate 1 g in NS IVPB (premixed) (0 g Intravenous Stopped 4/1/25 2039)       New Prescriptions    No medications on file               Scribe Attestation:   Scribe #1: I performed the above scribed service and the documentation accurately describes the services I performed. I attest to the accuracy of the note.     Attending:   Physician Attestation Statement for Scribe #1: I, Carlos Eduardo Paredes Jr., MD, personally performed the services described in this documentation, as scribed by Joana Azevedo, in my presence, and it is both accurate and complete.           Clinical Impression       ICD-10-CM ICD-9-CM   1. Sepsis, due to unspecified organism, unspecified whether acute organ dysfunction present  A41.9 038.9     995.91   2. Screening for cardiovascular condition  Z13.6 V81.2   3. Cellulitis of left leg  L03.116 682.6   4. Immunocompromised state due to drug therapy  D84.821 V58.69    Z79.899    5. History of kidney transplant  Z94.0 V42.0   6. Chronic renal impairment, unspecified CKD  stage  N18.9 585.9   7. Hypokalemia  E87.6 276.8   8. Hypocalcemia  E83.51 275.41   9. Chest pain  R07.9 786.50       Disposition:   Disposition: Admitted  Condition: Stable        Carlos Eduardo Paredes Jr., MD  04/01/25 2041

## 2025-04-01 NOTE — TELEPHONE ENCOUNTER
----- Message from Tracy sent at 4/1/2025  2:32 PM CDT -----  Contact: Bozena carl/ [employer]  .Type:  Patient Requesting CallWho Called:Bozena carl/ [employer] LtdDoes the patient know what this is regarding?:Bozena carl/ [employer] to let you know patients blood pressure is elevated. 180/90, he started vomiting this afternoon. Pulse was 113 and his gout is inflamedWould the patient rather a call back or a response via [facility]? Call [facility] Call Back Number:[phone] , [phone] (home)  Additional Information:  Please call back

## 2025-04-02 PROBLEM — A41.9 SEPSIS: Status: ACTIVE | Noted: 2025-04-02

## 2025-04-02 PROBLEM — E11.3553 TYPE 2 DIABETES MELLITUS WITH STABLE PROLIFERATIVE RETINOPATHY OF BOTH EYES, WITH LONG-TERM CURRENT USE OF INSULIN: Chronic | Status: ACTIVE | Noted: 2017-03-27

## 2025-04-02 PROBLEM — Z79.4 TYPE 2 DIABETES MELLITUS WITH STABLE PROLIFERATIVE RETINOPATHY OF BOTH EYES, WITH LONG-TERM CURRENT USE OF INSULIN: Chronic | Status: ACTIVE | Noted: 2017-03-27

## 2025-04-02 PROBLEM — E66.9 OBESITY (BMI 30-39.9): Status: ACTIVE | Noted: 2025-04-02

## 2025-04-02 PROBLEM — R29.898 WEAKNESS OF BOTH LOWER EXTREMITIES: Chronic | Status: ACTIVE | Noted: 2025-04-02

## 2025-04-02 PROBLEM — E11.69 HYPERLIPIDEMIA ASSOCIATED WITH TYPE 2 DIABETES MELLITUS: Chronic | Status: ACTIVE | Noted: 2024-12-13

## 2025-04-02 PROBLEM — E66.9 OBESITY (BMI 30-39.9): Chronic | Status: ACTIVE | Noted: 2025-04-02

## 2025-04-02 PROBLEM — I10 HYPERTENSION: Chronic | Status: ACTIVE | Noted: 2025-04-02

## 2025-04-02 PROBLEM — L03.116 CELLULITIS OF LEFT LOWER EXTREMITY: Status: ACTIVE | Noted: 2025-04-02

## 2025-04-02 PROBLEM — E87.6 HYPOKALEMIA: Status: ACTIVE | Noted: 2025-04-02

## 2025-04-02 PROBLEM — D63.8 ANEMIA, CHRONIC DISEASE: Chronic | Status: ACTIVE | Noted: 2025-03-01

## 2025-04-02 PROBLEM — Z86.718 HISTORY OF DVT (DEEP VEIN THROMBOSIS): Chronic | Status: ACTIVE | Noted: 2025-04-02

## 2025-04-02 PROBLEM — I50.42 CHRONIC COMBINED SYSTOLIC AND DIASTOLIC CONGESTIVE HEART FAILURE: Chronic | Status: ACTIVE | Noted: 2019-03-15

## 2025-04-02 PROBLEM — I10 HYPERTENSION: Status: ACTIVE | Noted: 2025-04-02

## 2025-04-02 PROBLEM — E83.42 HYPOMAGNESEMIA: Status: ACTIVE | Noted: 2025-04-02

## 2025-04-02 PROBLEM — Z86.718 HISTORY OF DVT (DEEP VEIN THROMBOSIS): Status: ACTIVE | Noted: 2025-04-02

## 2025-04-02 PROBLEM — E78.5 HYPERLIPIDEMIA ASSOCIATED WITH TYPE 2 DIABETES MELLITUS: Chronic | Status: ACTIVE | Noted: 2024-12-13

## 2025-04-02 PROBLEM — R29.898 WEAKNESS OF BOTH LOWER EXTREMITIES: Status: ACTIVE | Noted: 2025-04-02

## 2025-04-02 LAB
ABSOLUTE NEUTROPHIL MANUAL (OHS): 3.5 K/UL
ANION GAP (OHS): 11 MMOL/L (ref 8–16)
ANION GAP (OHS): 11 MMOL/L (ref 8–16)
ANISOCYTOSIS BLD QL SMEAR: SLIGHT
BUN SERPL-MCNC: 27 MG/DL (ref 8–23)
BUN SERPL-MCNC: 28 MG/DL (ref 8–23)
CALCIUM SERPL-MCNC: 8.1 MG/DL (ref 8.7–10.5)
CALCIUM SERPL-MCNC: 8.1 MG/DL (ref 8.7–10.5)
CHLORIDE SERPL-SCNC: 105 MMOL/L (ref 95–110)
CHLORIDE SERPL-SCNC: 105 MMOL/L (ref 95–110)
CO2 SERPL-SCNC: 24 MMOL/L (ref 23–29)
CO2 SERPL-SCNC: 24 MMOL/L (ref 23–29)
CREAT SERPL-MCNC: 1.8 MG/DL (ref 0.5–1.4)
CREAT SERPL-MCNC: 1.8 MG/DL (ref 0.5–1.4)
EOSINOPHIL NFR BLD MANUAL: 1 % (ref 0–8)
ERYTHROCYTE [DISTWIDTH] IN BLOOD BY AUTOMATED COUNT: 17.8 % (ref 11.5–14.5)
GFR SERPLBLD CREATININE-BSD FMLA CKD-EPI: 40 ML/MIN/1.73/M2
GFR SERPLBLD CREATININE-BSD FMLA CKD-EPI: 40 ML/MIN/1.73/M2
GLUCOSE SERPL-MCNC: 92 MG/DL (ref 70–110)
GLUCOSE SERPL-MCNC: 98 MG/DL (ref 70–110)
HCT VFR BLD AUTO: 31.4 % (ref 40–54)
HGB BLD-MCNC: 9.1 GM/DL (ref 14–18)
LYMPHOCYTES NFR BLD MANUAL: 19 % (ref 18–48)
MAGNESIUM SERPL-MCNC: 1.7 MG/DL (ref 1.6–2.6)
MCH RBC QN AUTO: 24.9 PG (ref 27–31)
MCHC RBC AUTO-ENTMCNC: 29 G/DL (ref 32–36)
MCV RBC AUTO: 86 FL (ref 82–98)
MONOCYTES NFR BLD MANUAL: 10 % (ref 4–15)
NEUTROPHILS NFR BLD MANUAL: 68 % (ref 38–73)
NEUTS BAND NFR BLD MANUAL: 2 %
NUCLEATED RBC (/100WBC) (OHS): 0 /100 WBC
OHS QRS DURATION: 152 MS
OHS QTC CALCULATION: 516 MS
OVALOCYTES BLD QL SMEAR: NORMAL
PLATELET # BLD AUTO: 147 K/UL (ref 150–450)
PLATELET BLD QL SMEAR: NORMAL
PMV BLD AUTO: 10.9 FL (ref 9.2–12.9)
POCT GLUCOSE: 109 MG/DL (ref 70–110)
POCT GLUCOSE: 183 MG/DL (ref 70–110)
POCT GLUCOSE: 190 MG/DL (ref 70–110)
POCT GLUCOSE: 191 MG/DL (ref 70–110)
POCT GLUCOSE: 98 MG/DL (ref 70–110)
POIKILOCYTOSIS BLD QL SMEAR: SLIGHT
POTASSIUM SERPL-SCNC: 3.3 MMOL/L (ref 3.5–5.1)
POTASSIUM SERPL-SCNC: 3.3 MMOL/L (ref 3.5–5.1)
RBC # BLD AUTO: 3.66 M/UL (ref 4.6–6.2)
SODIUM SERPL-SCNC: 140 MMOL/L (ref 136–145)
SODIUM SERPL-SCNC: 140 MMOL/L (ref 136–145)
TROPONIN I SERPL DL<=0.01 NG/ML-MCNC: 0.15 NG/ML
WBC # BLD AUTO: 4.98 K/UL (ref 3.9–12.7)

## 2025-04-02 PROCEDURE — 94760 N-INVAS EAR/PLS OXIMETRY 1: CPT

## 2025-04-02 PROCEDURE — 80048 BASIC METABOLIC PNL TOTAL CA: CPT | Performed by: NURSE PRACTITIONER

## 2025-04-02 PROCEDURE — 63600175 PHARM REV CODE 636 W HCPCS: Performed by: NURSE PRACTITIONER

## 2025-04-02 PROCEDURE — 83735 ASSAY OF MAGNESIUM: CPT | Performed by: HOSPITALIST

## 2025-04-02 PROCEDURE — 27000207 HC ISOLATION

## 2025-04-02 PROCEDURE — 25000003 PHARM REV CODE 250: Performed by: HOSPITALIST

## 2025-04-02 PROCEDURE — 21400001 HC TELEMETRY ROOM

## 2025-04-02 PROCEDURE — 63600175 PHARM REV CODE 636 W HCPCS: Performed by: HOSPITALIST

## 2025-04-02 PROCEDURE — 85027 COMPLETE CBC AUTOMATED: CPT | Performed by: NURSE PRACTITIONER

## 2025-04-02 PROCEDURE — 97166 OT EVAL MOD COMPLEX 45 MIN: CPT

## 2025-04-02 PROCEDURE — 97162 PT EVAL MOD COMPLEX 30 MIN: CPT

## 2025-04-02 PROCEDURE — 36415 COLL VENOUS BLD VENIPUNCTURE: CPT | Performed by: NURSE PRACTITIONER

## 2025-04-02 RX ADMIN — POTASSIUM CHLORIDE 40 MEQ: 1500 TABLET, EXTENDED RELEASE ORAL at 12:04

## 2025-04-02 RX ADMIN — MAGNESIUM SULFATE HEPTAHYDRATE 2 G: 40 INJECTION, SOLUTION INTRAVENOUS at 12:04

## 2025-04-02 RX ADMIN — CEFEPIME 2 G: 2 INJECTION, POWDER, FOR SOLUTION INTRAVENOUS at 06:04

## 2025-04-02 NOTE — ASSESSMENT & PLAN NOTE
Patient's FSGs are uncontrolled due to hypoglycemia on current medication regimen.  Last A1c reviewed-   Lab Results   Component Value Date    HGBA1C 5.7 (H) 12/17/2024     Most recent fingerstick glucose reviewed-   Recent Labs   Lab 04/01/25  1747 04/02/25  0016 04/02/25  0618   POCTGLUCOSE 67* 98 109     Current correctional scale  Low  Titrate as needed anti-hyperglycemic dose as follows-   Antihyperglycemics (From admission, onward)      Start     Stop Route Frequency Ordered    04/01/25 2134  insulin aspart U-100 pen 0-5 Units         -- SubQ Before meals & nightly PRN 04/01/25 2035          Plan:  -SSI  -A1c  -Accu-checks  -Hold oral hypoglycemics while patient is in the hospital  -Continue home long-acting insulin at 20% decrease, titrate up as needed  -Hypoglycemic protocol

## 2025-04-02 NOTE — ASSESSMENT & PLAN NOTE
Patient reported worsening bilateral lower extremity weakness times 2-3 months in which he reported symptoms began acutely after his right leg gave out going to the restroom.  Patient denies endorsing any back pain, experiencing ground level fall/trauma, but does report fecal incontinence when urinating.  Patient has since been bed-bound in his not been seen/evaluated by a physician for these symptoms.  Plan:  -replete electrolytes  -consider obtaining spine imaging for further evaluation  -PT/OT

## 2025-04-02 NOTE — HPI
Mitch Whittaker is a 69 y.o. 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), Diabetic retinopathy, DM (diabetes mellitus), type 2 with complications (1994), Edema, End stage kidney disease, Gallbladder polyp, Heart failure, diastolic, due to HTN, Hemodialysis status, 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 who presented to the ED for further evaluation of worsening left leg pain and swelling which began last night.  Patient reports being bed-bound but suffers from chronic venous stasis and recurrent skin infections.  Patient reported symptoms began acutely with no known alleviating or aggravating factors noted with a associated symptoms including nonproductive cough in addition to those noted above.  He denied endorsing any recent trauma to the affected area and reported being in his usual state of health prior to onset of symptoms.  All other review of systems negative except as noted above.  Initial workup in the ED revealed patient met SIRS criteria for sepsis with concerns for underlying cellulitis and was initiated on cefepime.  Remaining laboratory workup revealed H/H  9.6/31.8, potassium 2.8, creatinine/GFR 1.9/37, blood glucose 61, magnesium 1.1, troponin 0.152, lactic acid within normal limits, procalcitonin 1.60, chest x-ray negative for acute findings.  Patient admitted to Hospital Medicine inpatient for continued medical management.    PCP: Valery Caal

## 2025-04-02 NOTE — ASSESSMENT & PLAN NOTE
Body mass index is 31.87 kg/m². Morbid obesity complicates all aspects of disease management from diagnostic modalities to treatment. Weight loss encouraged and health benefits explained to patient.

## 2025-04-02 NOTE — NURSING TRANSFER
Nursing Transfer Note      4/1/2025   21:18    Nurse giving handoff: Thelma  Nurse receiving handoff: Nic    Reason patient is being transferred: admit to inpatient for cellulitis of LLE    Transfer From: ED    Transfer via stretcher    Transfer with cardiac monitoring    Transported by Thelma    Transfer Vital Signs at 2118:  Blood Pressure: 115/50  Heart Rate: 108  O2: 97% on room air  Temperature: 100.0 oral  Respirations: 18    Telemetry: Applied at 2115; Box Number 8623, Rate 114, Rhythm ST, and Telemetry  Nichole  Order for Tele Monitor? Yes    Additional Lines: None    Medicines sent: N/A    Any special needs or follow-up needed: No    Patient belongings transferred with patient: Yes    Chart send with patient: No    Notified: family at bedside    Upon arrival to floor: Patient was transferred to bed with assist x3; cardiac monitor was applied & verified w/ monitor tech that assigned box number was on correct patient, as well as patient's current rate & rhythm; vital signs were obtained; patient was oriented to room; skin assessment performed by Bina Chakraborty; admission assessment completed; white board filled out; SCDs refused d/t skin issues on BLE; ensured bed alarm was on & appropriate education on increased risk for falls was provided; HOB raised; side rails up x2; personal belongings & call bell were placed w/in patient's reach; ensured bed was locked & in lowest position; informed patient to call should they require anything.

## 2025-04-02 NOTE — ASSESSMENT & PLAN NOTE
"Patient has Combined Systolic and Diastolic heart failure that is Chronic. On presentation their CHF was well compensated. Most recent BNP and echo results are listed below.  No results for input(s): "BNP" in the last 72 hours.  Latest ECHO  Results for orders placed during the hospital encounter of 03/13/25    Echo Saline Bubble? No    Interpretation Summary    Left Ventricle: The left ventricle is normal in size. Mildly increased ventricular mass. Mildly increased wall thickness. There is mild concentric hypertrophy. Normal wall motion. Septal motion is consistent with bundle branch block. There is moderately reduced systolic function with a visually estimated ejection fraction of 35 - 40%. Grade I diastolic dysfunction.    Right Ventricle: The right ventricle is normal in size. Wall thickness is normal. Systolic function is normal.    Left Atrium: Mildly dilated    Aorta: Aortic annulus is mildly dilated measuring 4.01 cm. Ascending aorta is normal measuring 3.69 cm.    Pulmonary Artery: The estimated pulmonary artery systolic pressure is 24 mmHg.    IVC/SVC: Normal venous pressure at 3 mmHg.    Current Heart Failure Medications       Plan  -Monitor strict I&Os and daily weights.    -Place on telemetry  -Low sodium diet  -Place on fluid restriction of 1.5 L.   -Cardiology has not been consulted  -The patient's volume status is at their baseline  -Continue home medications    "

## 2025-04-02 NOTE — PLAN OF CARE
MACY COMPLETED: education and encouragement provided but pt unwilling to participate in EOB/OOB activity secondary to pain to knees. Recommend continued PT services at moderate intensity.

## 2025-04-02 NOTE — NURSING
Pt had aberrant conduction of nonsustained A-fib RVR. No hx of A-fib noted. He has been ST since admit tonight. Highest recorded pulse was 116. Secure chat sent to  Call Team.

## 2025-04-02 NOTE — ASSESSMENT & PLAN NOTE
Currently on [facility] outpatient.  Plan:  -continue home medication  -monitor H/H

## 2025-04-02 NOTE — ASSESSMENT & PLAN NOTE
Patient with known CAD, which is controlled.  EKG negative for signs of acute ischemia.  Troponin elevated at 0.15 to likely secondary to demand ischemia in setting of sepsis and CHF. Will continue home medications and monitor for S/Sx of angina/ACS. Continue to monitor on telemetry.

## 2025-04-02 NOTE — ASSESSMENT & PLAN NOTE
Patient's most recent potassium results are listed below.   Recent Labs     04/01/25  1804 04/01/25  2334 04/02/25  0420   K 2.8* 3.3* 3.3*     Plan  -Replete potassium per protocol  -Monitor potassium Daily  -Patient's hypokalemia is currently undergoing medical management

## 2025-04-02 NOTE — PT/OT/SLP EVAL
"Physical Therapy Evaluation    Patient Name:  Mitch Whittaker   MRN:  [id number]    Recommendations:     Discharge Recommendations: Moderate Intensity Therapy   Discharge Equipment Recommendations: none   Barriers to discharge: None    Assessment:     Mitch Whittaker is a 69 y.o. male admitted with a medical diagnosis of Cellulitis of left lower extremity.  He presents with the following impairments/functional limitations: weakness, impaired endurance, impaired coordination, decreased coordination, decreased safety awareness, pain, gait instability, impaired balance which limits mobility and increases risk of falls as well as other issues related to prolonged immobility. Pt unwilling to participate in supine therex, EOB/OOB activity due to potential of pain. Pt demonstrating self-limiting behaviors including statements of "I can't move my arm" despite PT witnessing him setting pitcher on bedside table upon PT arrival. Educated pt on benefits of PT services to address functional deficits and dangers of prolonged immobility but pt continued to be unwilling to participate secondary to pain. Spoke with pt and daughter about benefits of use of pain management in preparation for therapy services as he stated that he had not had any pain meds today. Spoke with MD who confirmed that pt does have several different pain meds available to him pending request. Will continued to attempt to provide PT services on trial basis, as pt has history of inconsistent participation and/or direct refusals. Pt will benefit from continued PT services at mod intensity but may change pending patient participation.    Rehab Prognosis: Fair; patient would benefit from acute skilled PT services to address these deficits and reach maximum level of function.    Recent Surgery: * No surgery found *      Plan:     During this hospitalization, patient to be seen 3 x/week to address the identified rehab impairments via gait training, therapeutic " "exercises, therapeutic activities, wheelchair management/training, neuromuscular re-education and progress toward the following goals:    Plan of Care Expires:  04/16/25    Subjective     Chief Complaint: pain  Patient/Family Comments/goals: "I can't move" and "I can't walk" pt's daughter reporting that he was ambulatory prior to first admit in January 2025.  Pain/Comfort:  Pain Rating 1:  (pt unable to rate pain, but asserted pain with mobility in any direction)    Patients cultural, spiritual, [employer] conflicts given the current situation: no    Living Environment:  Prior to January 2025 pt was living at home with wife in [place] with no steps at entry, independent with ADLs, ambulatory in home and community, pt was also working and driving. Since hospital admission, pt has been in/out of hospital and admitted to [facility] x 1 month where he was total care with use of lina lift and standing frame (per documentation). Prior to this admission pt was at home receiving HHPT services. Daughter reported that pt was sitting EOB and transferring with sliding board.  Equipment used at home: walker, rolling, hospital bed, slide board.  DME owned (not currently used): none.  Upon discharge, patient will have assistance from family.    Objective:     Communicated with nursing (Isidra) and performed chart review via epic system prior to session.  Patient found supine with peripheral IV, telemetry  upon PT entry to room. Daughter at bedside    General Precautions: Standard, fall, special contact  Orthopedic Precautions:N/A   Braces: N/A  Respiratory Status: Room air    Exams:  Cognitive Exam:  Patient is oriented to Person, Place, Time, and Situation  Gross Motor Coordination:  limited desire to participate  RLE ROM: decreased ankle ROM into DF/PF, unclear of PROM as pt resistant   RLE Strength: resistant to formal testing due to pain  LLE ROM: decreased ankle ROM into DF/PF, unclear of PROM as pt resistant  LLE Strength:  " resistant to formal testing due to pain    Functional Mobility:  Bed Mobility:     Rolling Left:  attempted to facilitate rolling but pt resistant, increased encouragement to mobilize BLE toward L sided bed rail to assist with mobility, only willing/able to mobilize LUE slightly. Pt refused adamantly to continue.      AM-PAC 6 CLICK MOBILITY  Total Score:6       Treatment & Education:  Educated pt on benefits of consistent participation in PT services to meet functional goals. Educated pt on dangers of prolonged immobility including further pain and joint stiffness, muscular wasting and skin injury. Educated pt on importance of sitting OOB to promote endurance and overall activity tolerance, pt unwilling. Educated pt on call don't fall policy and use of call button to alert nursing staff of needs (including to assist in/out of bed). Educated pt on importance of consistent participation and that due to history of refusals and current refusal that continued PT attempts would be made on trial basis and that pending willingness to participate he could be discharged from PT services. Pt and daughter expressed understanding.     Patient left sitting edge of bed with all lines intact, call button in reach, bed alarm on, nursing notified, and daughter present.    GOALS:   Multidisciplinary Problems       Physical Therapy Goals          Problem: Physical Therapy    Goal Priority Disciplines Outcome Interventions   Physical Therapy Goal     PT, PT/OT     Description: Pt will perform bed mobility with mod A in order to participate in EOB activity.  Pt will perform transfers with mod A in order to participate in OOB activity.  Pt will ambulate 50 ft mod I with LRAD in order to participate in daily tasks.   Pt will tolerate sitting OOB in chair x 2-4 hrs in order to participate in daily tasks.                        DME Justifications:  No DME recommended requiring DME justifications    History:     Past Medical History:    Diagnosis Date    Acquired renal cyst of left kidney     Anemia associated with chronic renal failure     CAD (coronary artery disease)     nonobstructive [facility] 9/14    CHF (congestive heart failure)     Chronic immunosuppression with Prograf and MMF 06/18/2015    Chronic venous insufficiency of lower extremity     CKD (chronic kidney disease) stage 3, GFR 30-59 ml/min     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 RNA NEGATIVE 5/2015, 2021, 2022    History of colon polyps     HPTH (hyperparathyroidism)     Hyperlipidemia     Hypertension associated with stage 3 chronic kidney disease due to type 2 diabetes mellitus     LBBB (left bundle branch block) 12/20/2021    Morbid obesity with BMI of 45.0-49.9, adult     Nephrolithiasis 6/7/2013    PCO (posterior capsular opacification), left 03/04/2019    Proteinuria     resolved s/p transplant    S/P kidney transplant     Sleep apnea     Type 2 diabetes, uncontrolled, with retinopathy     Type II diabetes mellitus with renal manifestations        Past Surgical History:   Procedure Laterality Date    CARDIAC CATHETERIZATION  01/01/2008    normal coronary    CARPAL TUNNEL RELEASE Right 12/01/2023    Procedure: RELEASE, CARPAL TUNNEL;  Surgeon: Noel Almonte MD;  Location: [facility] OR;  Service: Orthopedics;  Laterality: Right;    CARPAL TUNNEL RELEASE Left 7/18/2024    Procedure: RELEASE, CARPAL TUNNEL;  Surgeon: Noel Almonte MD;  Location: [facility] OR;  Service: Orthopedics;  Laterality: Left;    COLONOSCOPY N/A 04/05/2018    Procedure: COLONOSCOPY;  Surgeon: Chava Ronquillo MD;  Location: [facility] ENDO;  Service: Endoscopy;   Laterality: N/A;    COLONOSCOPY N/A 05/02/2022    Procedure: COLONOSCOPY;  Surgeon: Alix Puente MD;  Location: [facility];  Service: Endoscopy;  Laterality: N/A;    COLONOSCOPY N/A 06/07/2023    Procedure: COLONOSCOPY - rule out CMV  Cardiac clearance/Eliquis hold approval received on 05/21/23 per Dr. Meade, cardiology.  Note in encounters.  LB;  Surgeon: Daniella Shah MD;  Location: [facility];  Service: Endoscopy;  Laterality: N/A;    ESOPHAGOGASTRODUODENOSCOPY N/A 03/26/2024    Procedure: EGD (ESOPHAGOGASTRODUODENOSCOPY) 3/1-pt cleared, ok to hold eliquis for 3 days;  Surgeon: Daniella Shah MD;  Location: [facility];  Service: Endoscopy;  Laterality: N/A;    KIDNEY TRANSPLANT  2015    RETINAL LASER PROCEDURE         Time Tracking:     PT Received On: 04/02/25  PT Start Time: 1243     PT Stop Time: 1253  PT Total Time (min): 10 min     Billable Minutes: Evaluation 10      04/02/2025

## 2025-04-02 NOTE — ASSESSMENT & PLAN NOTE
Patient currently on Prograf, CellCept, and mycophenolate.  Plan:  -continue Prograf and CellCept  -holding mycophenolate

## 2025-04-02 NOTE — ASSESSMENT & PLAN NOTE
This patient does have evidence of infective focus  My overall impression is sepsis.  Source: Skin and Soft Tissue (location left leg cellulitis, gout of left knee)  Antibiotics given-   Antibiotics (72h ago, onward)      Start     Stop Route Frequency Ordered    04/02/25 0600  ceFEPIme injection 2 g         -- IV Every 12 hours (non-standard times) 04/01/25 2036          Latest lactate reviewed-  Recent Labs   Lab 04/01/25 2212   LACTATE 0.7     Organ dysfunction indicated by Acute kidney injury    Fluid challenge Ideal Body Weight- The patient's ideal body weight is Ideal body weight: 77.6 kg (171 lb 1.2 oz) which will be used to calculate fluid bolus of 30 ml/kg for treatment of septic shock.      Post- resuscitation assessment No - Post resuscitation assessment not needed     Will Not start Pressors- Levophed for MAP of 65    Source control achieved by: Cefepime    Follow up uric acid level and consider ortho consult for left knee aspiration to fully rule out gout flare.

## 2025-04-02 NOTE — ASSESSMENT & PLAN NOTE
Patient is chronically on statin.will continue for now. Last Lipid Panel:   Lab Results   Component Value Date    CHOL 175 12/17/2024    HDL 50 12/17/2024    LDLCALC 98.2 12/17/2024    TRIG 134 12/17/2024    CHOLHDL 28.6 12/17/2024     Plan:  -Continue home medication  -low fat/low calorie diet

## 2025-04-02 NOTE — ASSESSMENT & PLAN NOTE
Anemia is likely due to chronic disease due to Chronic Kidney Disease. Most recent hemoglobin and hematocrit are listed below.  Recent Labs     04/01/25  1804 04/02/25  0420   HGB 9.6* 9.1*   HCT 31.8* 31.4*     Plan  -Monitor serial CBC: Daily  -Transfuse PRBC if patient becomes hemodynamically unstable, symptomatic or H/H drops below 7/21.  -Patient has not received any PRBC transfusions to date  -Patient's anemia is currently stable

## 2025-04-02 NOTE — PT/OT/SLP EVAL
Occupational Therapy Evaluation and Treatment    Name: Mitch Whittaker  MRN: [id number]  Admitting Diagnosis: Cellulitis of left lower extremity  Recent Surgery: * No surgery found *      Recommendations:     Discharge Recommendations: Moderate Intensity Therapy  Level of Assistance Recommended: 24 hours significant assistance  Discharge Equipment Recommendations: to be determined by next level of care  Barriers to discharge:      Assessment:     Mitch Whittaker is a 69 y.o. male with a medical diagnosis of Cellulitis of left lower extremity. He presents with performance deficits affecting function including weakness, impaired self care skills, impaired balance, decreased safety awareness, impaired endurance, impaired functional mobility, gait instability.     Rehab Prognosis: Fair; patient would benefit from acute OT services to address these deficits and reach maximum level of function.    Plan:     Patient to be seen 2 x/week to address the above listed problems via self-care/home management, therapeutic activities, therapeutic exercises  Plan of Care Expires: 04/16/25  Plan of Care Reviewed with: patient    Subjective     Chief Complaint: DEBILITY AND GENERALIZED WEAKNESS  Patient Comments/Goals: GET STRONGER  Pain/Comfort:  Pain Rating 1: 10/10  Location - Orientation 1: generalized    Patients cultural, spiritual, Jehovah's witness conflicts given the current situation:      Social History:  Living Environment: Patient lives with their spouse in a single story home with number of outside stair(s): 1  Prior Level of Function: Prior to admission, patient requires assistance with ADLs including BATHING , TOILET ING AND FUNCTIONAL MOBILITY/ BED MOBILITY  Roles and Routines: Patient was not driving and not working prior to admission.  Equipment Used at Home: walker, rolling  Assistance Upon Discharge: facility staff    Objective:     Communicated with   NURSE AND EPIC CHART REVIEW prior to session. Patient found HOB elevated  with peripheral IV upon OT entry to room.    General Precautions: Standard, fall   Orthopedic Precautions: N/A   Braces: N/A    Respiratory Status: Room air    Occupational Performance      Bed Mobility:   Rolling/Turning to Left with total assistance  Rolling/Turning to Right with total assistance    Activities of Daily Living:  Upper Body Dressing: maximal assistance  Lower Body Dressing: dependence    Cognitive/Visual Perceptual:  Cognitive/Psychosocial Skills:    -     Oriented to: Person, Place, Time, Situation  -     Follows Commands/attention: Follows multistep  commands  -     Communication: clear/fluent  -     Memory: No Deficits noted  -     Safety awareness/insight to disability: impaired    Physical Exam:  Upper Extremity Range of Motion:     -       Right Upper Extremity: APPROX 90 DEGREES SHOULDER FLEXION  -       Left Upper Extremity: APPROX 90 DEGREES SHOULDER FLEXION  Upper Extremity Strength:    -       Right Upper Extremity: MMT: 2/5 GROSSLY  -       Left Upper Extremity: MMT: 2/5 GROSSLY   Strength:    -       Right Upper Extremity: MMT: 3/5 GROSSLY  -       Left Upper Extremity: MMT: 3/5 GROSSLY    AMPAC 6 Click ADL:  AMPAC Total Score: 9    Treatment & Education:  Pt ga poor motivation and require encouragement to participate with therapy. Educated patient on importance of increased tolerance to upright position and direct impact on CV endurance and strength. Patient encouraged to sit up IN BED WITH BED IN CHAIR POSITION, including for all meals.. Encouraged patient to perform AROM TE to BUE throughout the day within all available planes of motion. Re enforced importance of utilizing call light to meet needs in room and not attempt to get up without staff assistance. Patient verbalized understanding and agreed to comply.           Patient not clear to transfer with RN/PCT.    Patient left HOB elevated with all lines intact, call button in reach, RN notified, bed alarm on, and family  present.    GOALS:   Multidisciplinary Problems       Occupational Therapy Goals          Problem: Occupational Therapy    Goal Priority Disciplines Outcome Interventions   Occupational Therapy Goal     OT, PT/OT     Description: O.T. GOALS TO BE MET 4-16-25  PT WILL TOLERATE 1 SET X 20 REPS B UE ROM EXERCISE  MOD A WITH SUPINE<SIT TRANSFERS  MAX A WITH BSC TRANSFERS  MIN A WITH ROLLING L<>R                         DME Justifications:  TBD DEPENDING ON PROGRESS    History:     Past Medical History:   Diagnosis Date    Acquired renal cyst of left kidney     Anemia associated with chronic renal failure     CAD (coronary artery disease)     nonobstructive [facility] 9/14    CHF (congestive heart failure)     Chronic immunosuppression with Prograf and MMF 06/18/2015    Chronic venous insufficiency of lower extremity     CKD (chronic kidney disease) stage 3, GFR 30-59 ml/min     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 RNA NEGATIVE 5/2015, 2021, 2022    History of colon polyps     HPTH (hyperparathyroidism)     Hyperlipidemia     Hypertension associated with stage 3 chronic kidney disease due to type 2 diabetes mellitus     LBBB (left bundle branch block) 12/20/2021    Morbid obesity with BMI of 45.0-49.9, adult     Nephrolithiasis 6/7/2013    PCO (posterior capsular opacification), left 03/04/2019    Proteinuria     resolved s/p transplant    S/P kidney transplant     Sleep apnea     Type 2 diabetes, uncontrolled, with retinopathy     Type II diabetes mellitus with renal manifestations          Past Surgical History:   Procedure Laterality Date    CARDIAC CATHETERIZATION  01/01/2008     normal coronary    CARPAL TUNNEL RELEASE Right 12/01/2023    Procedure: RELEASE, CARPAL TUNNEL;  Surgeon: Noel Almonte MD;  Location: [facility] OR;  Service: Orthopedics;  Laterality: Right;    CARPAL TUNNEL RELEASE Left 7/18/2024    Procedure: RELEASE, CARPAL TUNNEL;  Surgeon: Noel Almonte MD;  Location: [facility] OR;  Service: Orthopedics;  Laterality: Left;    COLONOSCOPY N/A 04/05/2018    Procedure: COLONOSCOPY;  Surgeon: Chava Ronquillo MD;  Location: [facility] ENDO;  Service: Endoscopy;  Laterality: N/A;    COLONOSCOPY N/A 05/02/2022    Procedure: COLONOSCOPY;  Surgeon: Alix Puente MD;  Location: [facility] ENDO;  Service: Endoscopy;  Laterality: N/A;    COLONOSCOPY N/A 06/07/2023    Procedure: COLONOSCOPY - rule out CMV  Cardiac clearance/Eliquis hold approval received on 05/21/23 per Dr. Meade, cardiology.  Note in encounters.  LB;  Surgeon: Daniella Shah MD;  Location: [facility];  Service: Endoscopy;  Laterality: N/A;    ESOPHAGOGASTRODUODENOSCOPY N/A 03/26/2024    Procedure: EGD (ESOPHAGOGASTRODUODENOSCOPY) 3/1-pt cleared, ok to hold eliquis for 3 days;  Surgeon: Daniella Shah MD;  Location: [facility];  Service: Endoscopy;  Laterality: N/A;    KIDNEY TRANSPLANT  2015    RETINAL LASER PROCEDURE         Time Tracking:     OT Date of Treatment: 04/02/25  OT Start Time: 1250  OT Stop Time: 1300  OT Total Time (min): 10 min    Billable Minutes: Evaluation 10 MINUTES    4/2/2025

## 2025-04-02 NOTE — PLAN OF CARE
A254/A254 SB Whittaker is a 69 y.o.male admitted on 4/1/2025 for Cellulitis of left lower extremity   Code Status: Full Code MRN: [id number]   Review of patient's allergies indicates:   Allergen Reactions    Lisinopril Other (See Comments)     Other reaction(s):  cough    Actos  [pioglitazone] Other (See Comments)     Other reaction(s): CHF    Metformin Other (See Comments)     Other reaction(s): renal insuff  Other reaction(s): CHF     Past Medical History:   Diagnosis Date    Acquired renal cyst of left kidney     Anemia associated with chronic renal failure     CAD (coronary artery disease)     nonobstructive [facility] 9/14    CHF (congestive heart failure)     Chronic immunosuppression with Prograf and MMF 06/18/2015    Chronic venous insufficiency of lower extremity     CKD (chronic kidney disease) stage 3, GFR 30-59 ml/min     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 RNA NEGATIVE 5/2015, 2021, 2022    History of colon polyps     HPTH (hyperparathyroidism)     Hyperlipidemia     Hypertension associated with stage 3 chronic kidney disease due to type 2 diabetes mellitus     LBBB (left bundle branch block) 12/20/2021    Morbid obesity with BMI of 45.0-49.9, adult     Nephrolithiasis 6/7/2013    PCO (posterior capsular opacification), left 03/04/2019    Proteinuria     resolved s/p transplant    S/P kidney transplant     Sleep apnea     Type 2 diabetes, uncontrolled, with retinopathy     Type II diabetes mellitus with renal manifestations       PRN meds    acetaminophen, 650 mg, Q6H PRN  acetaminophen, 650 mg, Q6H PRN  aluminum-magnesium hydroxide-simethicone, 30 mL, QID  PRN  dextrose 50%, 12.5 g, PRN  dextrose 50%, 25 g, PRN  glucagon (human recombinant), 1 mg, PRN  glucose, 16 g, PRN  glucose, 24 g, PRN  HYDROcodone-acetaminophen, 1 tablet, Q6H PRN  insulin aspart U-100, 0-5 Units, QID (AC + HS) PRN  melatonin, 6 mg, Nightly PRN  morphine, 2 mg, Q4H PRN  naloxone, 0.02 mg, PRN  ondansetron, 4 mg, Q8H PRN  polyethylene glycol, 17 g, Daily PRN  promethazine, 25 mg, Q6H PRN  simethicone, 1 tablet, QID PRN  sodium chloride 0.9%, 3 mL, Q12H PRN      Chart check completed. Will continue plan of care.      Orientation: oriented x 4  Tung Coma Scale Score: 15     Lead Monitored: Lead II Rhythm: normal sinus rhythm    Cardiac/Telemetry Box Number: [id number]  VTE Core Measure: Patient refused interventions Last Bowel Movement: 03/31/25  Diet Heart Healthy Renal Non-Dialysis, Consistent Carbohydrate; 2000 Calories (up to 75 gm per meal); Fluid - 2000mL  Voiding Characteristics: incontinence  Dewayne Score: 16  Fall Risk Score: 18  Accucheck [x]   Freq?      Lines/Drains/Airways       Peripheral Intravenous Line  Duration                  Hemodialysis AV Fistula Right upper arm -- days         Peripheral IV - Single Lumen 04/01/25 1756 20 G Left Antecubital 1 day

## 2025-04-02 NOTE — SUBJECTIVE & OBJECTIVE
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 RNA NEGATIVE 5/2015, 2021, 2022    History of colon polyps     HPTH (hyperparathyroidism)     Hyperlipidemia     Hypertension associated with stage 3 chronic kidney disease due to type 2 diabetes mellitus     LBBB (left bundle branch block) 12/20/2021    Morbid obesity with BMI of 45.0-49.9, adult     Nephrolithiasis 6/7/2013    PCO (posterior capsular opacification), left 03/04/2019    Proteinuria     resolved s/p transplant    S/P kidney transplant     Sleep apnea     Type 2 diabetes, uncontrolled, with retinopathy     Type II diabetes mellitus with renal manifestations        Past Surgical History:   Procedure Laterality Date    CARDIAC CATHETERIZATION  01/01/2008    normal coronary    CARPAL TUNNEL RELEASE Right 12/01/2023    Procedure: RELEASE, CARPAL TUNNEL;  Surgeon: Noel Almonte MD;  Location: [facility] OR;  Service: Orthopedics;  Laterality: Right;    CARPAL TUNNEL RELEASE Left 7/18/2024    Procedure: RELEASE, CARPAL TUNNEL;  Surgeon: Noel Almonte MD;  Location: [facility] OR;  Service: Orthopedics;  Laterality: Left;    COLONOSCOPY N/A 04/05/2018    Procedure: COLONOSCOPY;  Surgeon: Chava Ronquillo MD;  Location: [facility] ENDO;   Service: Endoscopy;  Laterality: N/A;    COLONOSCOPY N/A 05/02/2022    Procedure: COLONOSCOPY;  Surgeon: Alix Puente MD;  Location: [facility];  Service: Endoscopy;  Laterality: N/A;    COLONOSCOPY N/A 06/07/2023    Procedure: COLONOSCOPY - rule out CMV  Cardiac clearance/Eliquis hold approval received on 05/21/23 per Dr. Meade, cardiology.  Note in encounters.  LB;  Surgeon: Daniella Shah MD;  Location: [facility];  Service: Endoscopy;  Laterality: N/A;    ESOPHAGOGASTRODUODENOSCOPY N/A 03/26/2024    Procedure: EGD (ESOPHAGOGASTRODUODENOSCOPY) 3/1-pt cleared, ok to hold eliquis for 3 days;  Surgeon: Daniella Shah MD;  Location: [facility];  Service: Endoscopy;  Laterality: N/A;    KIDNEY TRANSPLANT  2015    RETINAL LASER PROCEDURE         Review of patient's allergies indicates:   Allergen Reactions    Lisinopril Other (See Comments)     Other reaction(s):  cough    Actos  [pioglitazone] Other (See Comments)     Other reaction(s): CHF    Metformin Other (See Comments)     Other reaction(s): renal insuff  Other reaction(s): CHF       No current facility-administered medications on file prior to encounter.     Current Outpatient Medications on File Prior to Encounter   Medication Sig    albuterol (PROVENTIL) 2.5 mg /3 mL (0.083 %) nebulizer solution Take 3 mLs (2.5 mg total) by nebulization every 4 to 6 hours as needed for Wheezing or Shortness of Breath. (Patient not taking: Reported on 3/31/2025)    albuterol (PROVENTIL/VENTOLIN HFA) 90 mcg/actuation inhaler Inhale 2 puffs into the lungs every 4 (four) hours as needed for Wheezing or Shortness of Breath. (Patient not taking: Reported on 3/31/2025)    allopurinoL (ZYLOPRIM) 100 MG tablet Take ½ tablet (50 mg total) by mouth once daily.    amitriptyline (ELAVIL) 25 MG tablet Take 1 tablet (25 mg total) by mouth nightly as needed for Insomnia. (Patient not taking: Reported on 3/31/2025)    apixaban (ELIQUIS) 5 mg Tab Take 1 tablet (5 mg total) by  "mouth 2 (two) times daily.    aspirin (ECOTRIN) 81 MG EC tablet Take 1 tablet by mouth Daily.     calcitRIOL (ROCALTROL) 0.25 MCG Cap Take 1 capsule (0.25 mcg total) by mouth once daily.    cholestyramine (QUESTRAN) 4 gram packet Take 1 packet (4 g total) by mouth 2 (two) times daily. for 9 days    famotidine (PEPCID) 20 MG tablet TAKE ONE TABLET BY MOUTH EVERY EVENING    ferrous sulfate (FEROSUL) 325 mg (65 mg iron) Tab tablet Take 1 tablet (325 mg total) by mouth daily with breakfast.    fish oil-omega-3 fatty acids 300-1,000 mg capsule Take 1 g by mouth once daily.    furosemide (LASIX) 40 MG tablet Take 1 tablet (40 mg total) by mouth once daily.    HYDROcodone-acetaminophen (NORCO) 5-325 mg per tablet Take 1 tablet by mouth every 6 (six) hours as needed for Pain. (Patient not taking: Reported on 3/31/2025)    insulin aspart U-100 (NOVOLOG FLEXPEN U-100 INSULIN) 100 unit/mL (3 mL) InPn pen Inject 25 Units into the skin 3 (three) times daily with meals.    insulin aspart U-100 (NOVOLOG) 100 unit/mL (3 mL) InPn pen Inject 0-10 Units into the skin before meals and at bedtime as needed (Hyperglycemia). **MODERATE CORRECTION DOSE**  Blood Glucose  mg/dL                  Pre-meal                2200  151-200                2 units                    1 unit  201-250                4 units                    2 units  251-300                6 units                    3 units  301-350                8 units                    4 units  >350                     10 units                  5 units  Administer subcutaneously if needed at times designated by monitoring  schedule.  DO NOT HOLD correction dose insulin for patients who are  NPO.    "HIGH ALERT MEDICATION" - Administer with meals or TF/TPN.    insulin glargine U-100, Lantus, (LANTUS SOLOSTAR U-100 INSULIN) 100 unit/mL (3 mL) InPn pen Inject 15 Units into the skin 2 (two) times daily.    ketoconazole (NIZORAL) 200 mg Tab Take ½ tablet (100 mg total) by mouth once " daily.    metoprolol succinate (TOPROL-XL) 25 MG 24 hr tablet Take 1 tablet (25 mg total) by mouth once daily.    multivitamin (THERAGRAN) tablet Take 1 tablet by mouth once daily.    mycophenolate (CELLCEPT) 250 mg Cap Take 2 capsules (500 mg total) by mouth 2 (two) times daily.    ondansetron (ZOFRAN-ODT) 4 MG TbDL Dissolve 1 tablet (4 mg total) by mouth every 8 (eight) hours as needed (Nausea/vomiting). (Patient not taking: Reported on 3/31/2025)    predniSONE (DELTASONE) 5 MG tablet Take 1 tablet (5 mg total) by mouth once daily.    rosuvastatin (CRESTOR) 40 MG Tab Take 1 tablet (40 mg total) by mouth once daily.    [Paused] sacubitriL-valsartan (ENTRESTO)  mg per tablet Take 1 tablet by mouth 2 (two) times daily. (Patient not taking: Reported on 3/31/2025)    semaglutide (OZEMPIC) 2 mg/dose (8 mg/3 mL) PnIj Inject 2 mg into the skin every 7 days.    sodium bicarbonate 650 MG tablet Take 1 tablet (650 mg total) by mouth 2 (two) times daily.    tacrolimus (PROGRAF) 1 MG Cap Take 4 capsules (4mg) every morning AND take 3 capsules (3mg) by mouth every evening    triamcinolone acetonide 0.1% (KENALOG) 0.1 % ointment Apply topically 2 (two) times daily. Use on bilateral lower legs.     Family History       Problem Relation (Age of Onset)    Diabetes Mother, Sister, Maternal Grandmother    Heart failure Mother, Father    Hypertension Mother    Kidney disease Sister          Tobacco Use    Smoking status: Former     Current packs/day: 0.00     Types: Cigarettes     Quit date: 2013     Years since quittin.8     Passive exposure: Past    Smokeless tobacco: Former     Quit date: 2013    Tobacco comments:     used marijuana since 9980-8196, stopped after started dialysis   Substance and Sexual Activity    Alcohol use: No     Alcohol/week: 0.0 standard drinks of alcohol    Drug use: Not Currently     Comment:      Sexual activity: Never     Review of Systems   All other systems reviewed and are  negative.    Objective:     Vital Signs (Most Recent):  Temp: 99 °F (37.2 °C) (04/02/25 0002)  Pulse: 95 (04/02/25 0002)  Resp: 17 (04/02/25 0002)  BP: 104/60 (04/02/25 0002)  SpO2: 96 % (04/02/25 0002) Vital Signs (24h Range):  Temp:  [99 °F (37.2 °C)-103 °F (39.4 °C)] 99 °F (37.2 °C)  Pulse:  [] 95  Resp:  [17-33] 17  SpO2:  [96 %-98 %] 96 %  BP: (104-157)/(50-73) 104/60     Weight: 106.6 kg (235 lb 0.2 oz)  Body mass index is 31.87 kg/m².     Physical Exam  Vitals reviewed.   Constitutional:       General: He is not in acute distress.     Appearance: Normal appearance. He is obese. He is not ill-appearing, toxic-appearing or diaphoretic.   HENT:      Head: Normocephalic and atraumatic.      Right Ear: External ear normal.      Left Ear: External ear normal.      Nose: Nose normal. No congestion or rhinorrhea.      Mouth/Throat:      Mouth: Mucous membranes are moist.      Pharynx: Oropharynx is clear. No oropharyngeal exudate or posterior oropharyngeal erythema.   Eyes:      General: No scleral icterus.     Extraocular Movements: Extraocular movements intact.      Conjunctiva/sclera: Conjunctivae normal.      Pupils: Pupils are equal, round, and reactive to light.   Neck:      Vascular: No carotid bruit.   Cardiovascular:      Rate and Rhythm: Normal rate and regular rhythm.      Pulses: Normal pulses.      Heart sounds: Normal heart sounds. No murmur heard.     No friction rub. No gallop.   Pulmonary:      Effort: Pulmonary effort is normal. No respiratory distress.      Breath sounds: Normal breath sounds. No stridor. No wheezing, rhonchi or rales.   Chest:      Chest wall: No tenderness.   Abdominal:      General: Abdomen is flat. Bowel sounds are normal. There is no distension.      Palpations: Abdomen is soft. There is no mass.      Tenderness: There is no abdominal tenderness. There is no guarding or rebound.      Hernia: No hernia is present.   Musculoskeletal:         General: Swelling and  tenderness present. No deformity or signs of injury. Normal range of motion.      Cervical back: Normal range of motion and neck supple. No rigidity or tenderness.      Right lower leg: Edema present.      Left lower leg: Edema present.      Comments: TTP noted throughout left knee with a associated warmth and swelling noted throughout left lower extremity.  Negative TTP noted throughout entire spine both midline or paraspinally.    Lymphadenopathy:      Cervical: No cervical adenopathy.   Skin:     General: Skin is warm and dry.      Capillary Refill: Capillary refill takes less than 2 seconds.      Coloration: Skin is not jaundiced or pale.      Findings: Erythema present. No bruising, lesion or rash.      Comments: Bilateral chronic venous stasis present.  Erythema noted throughout left knee extending down left lower extremity.   Neurological:      Mental Status: He is alert and oriented to person, place, and time.      Cranial Nerves: No cranial nerve deficit.      Sensory: No sensory deficit.      Motor: Weakness present.      Coordination: Coordination normal.      Comments: Patient unable to perform left leg raise however has 4/5 strength upon left hip flexion as well as left plantar/dorsiflexion/extension.  Patient with 3/5 right leg raise present and 4/5 right hip flexion and right foot plantar/dorsi flexion/extension.  Bilateral upper extremities yielding 5/5 strength throughout.  Sensation to light touch grossly intact throughout.   Psychiatric:         Mood and Affect: Mood normal.         Behavior: Behavior normal.         Thought Content: Thought content normal.         Judgment: Judgment normal.              CRANIAL NERVES     CN III, IV, VI   Pupils are equal, round, and reactive to light.       Significant Labs: All pertinent labs within the past 24 hours have been reviewed.    Significant Imaging: I have reviewed all pertinent imaging results/findings within the past 24 hours.    LABS:  Recent  Results (from the past 24 hours)   POCT glucose    Collection Time: 04/01/25  5:47 PM   Result Value Ref Range    POCT Glucose 67 (L) 70 - 110 mg/dL   Comprehensive metabolic panel    Collection Time: 04/01/25  6:04 PM   Result Value Ref Range    Sodium 142 136 - 145 mmol/L    Potassium 2.8 (L) 3.5 - 5.1 mmol/L    Chloride 104 95 - 110 mmol/L    CO2 27 23 - 29 mmol/L    Glucose 61 (L) 70 - 110 mg/dL    BUN 27 (H) 8 - 23 mg/dL    Creatinine 1.9 (H) 0.5 - 1.4 mg/dL    Calcium 8.1 (L) 8.7 - 10.5 mg/dL    Protein Total 5.9 (L) 6.0 - 8.4 gm/dL    Albumin 2.3 (L) 3.5 - 5.2 g/dL    Bilirubin Total 1.1 (H) 0.1 - 1.0 mg/dL    ALP 96 40 - 150 unit/L    AST 19 11 - 45 unit/L    ALT 14 10 - 44 unit/L    Anion Gap 11 8 - 16 mmol/L    eGFR 37 (L) >60 mL/min/1.73/m2   Lactic acid, plasma #1    Collection Time: 04/01/25  6:04 PM   Result Value Ref Range    Lactic Acid Level 1.0 0.5 - 2.2 mmol/L   Troponin I    Collection Time: 04/01/25  6:04 PM   Result Value Ref Range    Troponin-I 0.152 (H) <=0.026 ng/mL   Procalcitonin    Collection Time: 04/01/25  6:04 PM   Result Value Ref Range    Procalcitonin 1.60 (H) <0.25 ng/mL   CBC with Differential    Collection Time: 04/01/25  6:04 PM   Result Value Ref Range    WBC 4.81 3.90 - 12.70 K/uL    RBC 3.82 (L) 4.60 - 6.20 M/uL    HGB 9.6 (L) 14.0 - 18.0 gm/dL    HCT 31.8 (L) 40.0 - 54.0 %    MCV 83 82 - 98 fL    MCH 25.1 (L) 27.0 - 31.0 pg    MCHC 30.2 (L) 32.0 - 36.0 g/dL    RDW 17.8 (H) 11.5 - 14.5 %    Platelet Count 176 150 - 450 K/uL    MPV 11.4 9.2 - 12.9 fL    Nucleated RBC 0 <=0 /100 WBC   Manual Differential    Collection Time: 04/01/25  6:04 PM   Result Value Ref Range    Gran # (ANC) 3.4 K/uL    Segmented Neutrophil % 70.0 38.0 - 73.0 %    Lymphocyte % 20.0 18.0 - 48.0 %    Monocyte % 10.0 4.0 - 15.0 %    Platelet Estimate Appears Normal      Anisocytosis Slight     Poik Slight     Ovalocytes Occasional     Large/Giant Platelets Present    Magnesium    Collection Time: 04/01/25   6:04 PM   Result Value Ref Range    Magnesium  1.1 (L) 1.6 - 2.6 mg/dL   Urinalysis, Reflex to Urine Culture    Collection Time: 04/01/25  7:09 PM    Specimen: Urine   Result Value Ref Range    Color, UA Yellow Straw, Nikki, Yellow, Light-Orange    Appearance, UA Clear Clear    pH, UA 6.0 5.0 - 8.0    Spec Grav UA 1.020 1.005 - 1.030    Protein, UA 1+ (A) Negative    Glucose, UA 1+ (A) Negative    Ketones, UA Negative Negative    Bilirubin, UA Negative Negative    Blood, UA Trace (A) Negative    Nitrites, UA Negative Negative    Urobilinogen, UA Negative <2.0 EU/dL    Leukocyte Esterase, UA Negative Negative   Urinalysis Microscopic    Collection Time: 04/01/25  7:09 PM   Result Value Ref Range    RBC, UA 0 0 - 4 /HPF    WBC, UA 1 0 - 5 /HPF    Bacteria, UA Rare None, Rare, Occasional /HPF    Hyaline Casts, UA 0 0 - 1 /LPF    Microscopic Comment     Lactic acid, plasma #2    Collection Time: 04/01/25 10:12 PM   Result Value Ref Range    Lactic Acid Level 0.7 0.5 - 2.2 mmol/L   Basic metabolic panel    Collection Time: 04/01/25 11:34 PM   Result Value Ref Range    Sodium 140 136 - 145 mmol/L    Potassium 3.3 (L) 3.5 - 5.1 mmol/L    Chloride 105 95 - 110 mmol/L    CO2 24 23 - 29 mmol/L    Glucose 92 70 - 110 mg/dL    BUN 27 (H) 8 - 23 mg/dL    Creatinine 1.8 (H) 0.5 - 1.4 mg/dL    Calcium 8.1 (L) 8.7 - 10.5 mg/dL    Anion Gap 11 8 - 16 mmol/L    eGFR 40 (L) >60 mL/min/1.73/m2   POCT glucose    Collection Time: 04/02/25 12:16 AM   Result Value Ref Range    POCT Glucose 98 70 - 110 mg/dL       RADIOLOGY  X-Ray Chest AP Portable  Result Date: 4/1/2025  Exam: XR CHEST AP PORTABLE Comparison: 03/13/2025 Clinical Indication:  Sepsis Findings: Heart size and pulmonary vasculature are unremarkable.  No focal consolidation, pleural effusions or pneumothorax. No acute angulated or displaced fractures. Stent in the subclavian artery on the right.     No acute cardiopulmonary disease. Finalized on: 4/1/2025 7:26 PM By:  Raudel  [place] [facility]# [id number]      2025-04-01 19:28:[id number]     [facility]    Echo Saline Bubble? No  Result Date: 3/14/2025    Left Ventricle: The left ventricle is normal in size. Mildly increased ventricular mass. Mildly increased wall thickness. There is mild concentric hypertrophy. Normal wall motion. Septal motion is consistent with bundle branch block. There is moderately reduced systolic function with a visually estimated ejection fraction of 35 - 40%. Grade I diastolic dysfunction.   Right Ventricle: The right ventricle is normal in size. Wall thickness is normal. Systolic function is normal.   Left Atrium: Mildly dilated   Aorta: Aortic annulus is mildly dilated measuring 4.01 cm. Ascending aorta is normal measuring 3.69 cm.   Pulmonary Artery: The estimated pulmonary artery systolic pressure is 24 mmHg.   IVC/SVC: Normal venous pressure at 3 mmHg.     US Lower Extremity Veins Bilateral  Result Date: 3/14/2025  EXAMINATION: US LOWER EXTREMITY VEINS BILATERAL CLINICAL HISTORY: Other specified soft tissue disorders TECHNIQUE: Duplex and color flow Doppler and dynamic compression was performed of the bilateral lower extremity veins was performed. COMPARISON: 01/14/2025 FINDINGS: Right thigh veins: The common femoral, femoral, popliteal, upper greater saphenous, and deep femoral veins are patent and free of thrombus. The veins are normally compressible and have normal phasic flow and augmentation response. Right calf veins: The visualized calf veins are patent. Left thigh veins: The common femoral, femoral, popliteal, upper greater saphenous, and deep femoral veins are patent and free of thrombus. The veins are normally compressible and have normal phasic flow and augmentation response. Left calf veins: The visualized calf veins are patent. Miscellaneous: Diffuse soft tissue swelling.     No evidence of deep venous thrombosis in either lower extremity. Electronically signed by: Andrew Crespo MD Date:    03/14/2025  Time:    08:36    X-Ray Chest AP Portable  Result Date: 3/14/2025  EXAMINATION: XR CHEST AP PORTABLE CLINICAL HISTORY: Edema; FINDINGS: Single view of the chest.  Comparison 02/26/2025 Cardiac silhouette is normal.  The lungs demonstrate no evidence of active disease.  No evidence of pleural effusion or pneumothorax.  Bones appear intact.  Moderate degenerative changes and moderate atherosclerotic disease.  Right axillary stent.  Low lung volumes.     No acute process seen. Electronically signed by: Andrew Crespo MD Date:    03/14/2025 Time:    07:19      EKG    MICROBIOLOGY    MDM

## 2025-04-02 NOTE — ASSESSMENT & PLAN NOTE
Patient has Abnormal Magnesium: hypomagnesemia. Will continue to monitor electrolytes closely. Will replace the affected electrolytes and repeat labs to be done after interventions completed. The patient's magnesium results have been reviewed and are listed below.  Recent Labs   Lab 04/01/25  1804   MG 1.1*

## 2025-04-02 NOTE — ASSESSMENT & PLAN NOTE
Chronic, uncontrolled.  Latest blood pressure and vitals reviewed-   Temp:  [99 °F (37.2 °C)-103 °F (39.4 °C)]   Pulse:  []   Resp:  [17-33]   BP: (104-157)/(50-73)   SpO2:  [96 %-98 %] .   Home meds for hypertension were reviewed and noted below.   Hypertension Medications              furosemide (LASIX) 40 MG tablet Take 1 tablet (40 mg total) by mouth once daily.    metoprolol succinate (TOPROL-XL) 25 MG 24 hr tablet Take 1 tablet (25 mg total) by mouth once daily.    sacubitriL-valsartan (ENTRESTO)  mg per tablet ([Paused] since 3/11/2025 12:59 PM) Take 1 tablet by mouth 2 (two) times daily.     While in the hospital, will manage blood pressure as follows; Continue home antihypertensive regimen    Will utilize p.r.n. blood pressure medication only if patient's blood pressure greater than  180/110 and he develops symptoms such as worsening chest pain or shortness of breath.

## 2025-04-02 NOTE — H&P
[facility] Medicine  History & Physical    Patient Name: Mitch Whittaker  MRN: [id number]  Patient Class: IP- Inpatient  Admission Date: 4/1/2025  Attending Physician: Carlos Mathew MD   Primary Care Provider: Valery Caal MD         Patient information was obtained from patient, past medical records, and ER records.     Subjective:     Principal Problem:Cellulitis of left lower extremity    Chief Complaint:   Chief Complaint   Patient presents with    Leg Pain     Pt presented with complaints of pain to left leg. Denies injury or trauma. Pt states he's bedbound.         HPI: Mitch Whittaker is a 69 y.o. 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), Diabetic retinopathy, DM (diabetes mellitus), type 2 with complications (1994), Edema, End stage kidney disease, Gallbladder polyp, Heart failure, diastolic, due to HTN, Hemodialysis status, 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 who presented to the ED for further evaluation of worsening left leg pain and swelling which began last night.  Patient reports being bed-bound but suffers from  chronic venous stasis and recurrent skin infections.  Patient reported symptoms began acutely with no known alleviating or aggravating factors noted with a associated symptoms including nonproductive cough in addition to those noted above.  He denied endorsing any recent trauma to the affected area and reported being in his usual state of health prior to onset of symptoms.  All other review of systems negative except as noted above.  Initial workup in the ED revealed patient met SIRS criteria for sepsis with concerns for underlying cellulitis and was initiated on cefepime.  Remaining laboratory workup revealed H/H 9.6/31.8, potassium 2.8, creatinine/GFR 1.9/37, blood glucose 61, magnesium 1.1, troponin 0.152, lactic acid within normal limits, procalcitonin 1.60, chest x-ray negative for acute findings.  Patient admitted to Hospital Medicine inpatient for continued medical management.    PCP: Valery Caal      Past Medical History:   Diagnosis Date    Acquired renal cyst of left kidney     Anemia associated with chronic renal failure     CAD (coronary artery disease)     nonobstructive lhc 9/14    CHF (congestive heart failure)     Chronic immunosuppression with Prograf and MMF 06/18/2015    Chronic venous insufficiency of lower extremity     CKD (chronic kidney disease) stage 3, GFR 30-59 ml/min     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 RNA NEGATIVE 5/2015, 2021, 2022    History of colon polyps     HPTH (hyperparathyroidism)     Hyperlipidemia     Hypertension associated with stage 3 chronic kidney disease due to type 2 diabetes mellitus     LBBB (left bundle branch  block) 12/20/2021    Morbid obesity with BMI of 45.0-49.9, adult     Nephrolithiasis 6/7/2013    PCO (posterior capsular opacification), left 03/04/2019    Proteinuria     resolved s/p transplant    S/P kidney transplant     Sleep apnea     Type 2 diabetes, uncontrolled, with retinopathy     Type II diabetes mellitus with renal manifestations        Past Surgical History:   Procedure Laterality Date    CARDIAC CATHETERIZATION  01/01/2008    normal coronary    CARPAL TUNNEL RELEASE Right 12/01/2023    Procedure: RELEASE, CARPAL TUNNEL;  Surgeon: Noel Almonte MD;  Location: [facility] OR;  Service: Orthopedics;  Laterality: Right;    CARPAL TUNNEL RELEASE Left 7/18/2024    Procedure: RELEASE, CARPAL TUNNEL;  Surgeon: Noel Almonte MD;  Location: [facility] OR;  Service: Orthopedics;  Laterality: Left;    COLONOSCOPY N/A 04/05/2018    Procedure: COLONOSCOPY;  Surgeon: Chava Ronquillo MD;  Location: [facility] ENDO;  Service: Endoscopy;  Laterality: N/A;    COLONOSCOPY N/A 05/02/2022    Procedure: COLONOSCOPY;  Surgeon: Alix Puente MD;  Location: [facility] ENDO;  Service: Endoscopy;  Laterality: N/A;    COLONOSCOPY N/A 06/07/2023    Procedure: COLONOSCOPY - rule out CMV  Cardiac clearance/Eliquis hold approval received on 05/21/23 per Dr. Meade, cardiology.  Note in encounters.  LB;  Surgeon: Daniella Shah MD;  Location: [facility];  Service: Endoscopy;  Laterality: N/A;    ESOPHAGOGASTRODUODENOSCOPY N/A 03/26/2024    Procedure: EGD (ESOPHAGOGASTRODUODENOSCOPY) 3/1-pt cleared, ok to hold eliquis for 3 days;  Surgeon: Daniella Shah MD;  Location: [facility];  Service: Endoscopy;  Laterality: N/A;    KIDNEY TRANSPLANT  2015    RETINAL LASER PROCEDURE         Review of patient's allergies indicates:   Allergen Reactions    Lisinopril Other (See Comments)     Other reaction(s):  cough    Actos  [pioglitazone] Other (See Comments)     Other reaction(s): CHF    Metformin Other (See Comments)     Other  reaction(s): renal insuff  Other reaction(s): CHF       No current facility-administered medications on file prior to encounter.     Current Outpatient Medications on File Prior to Encounter   Medication Sig    albuterol (PROVENTIL) 2.5 mg /3 mL (0.083 %) nebulizer solution Take 3 mLs (2.5 mg total) by nebulization every 4 to 6 hours as needed for Wheezing or Shortness of Breath. (Patient not taking: Reported on 3/31/2025)    albuterol (PROVENTIL/VENTOLIN HFA) 90 mcg/actuation inhaler Inhale 2 puffs into the lungs every 4 (four) hours as needed for Wheezing or Shortness of Breath. (Patient not taking: Reported on 3/31/2025)    allopurinoL (ZYLOPRIM) 100 MG tablet Take ½ tablet (50 mg total) by mouth once daily.    amitriptyline (ELAVIL) 25 MG tablet Take 1 tablet (25 mg total) by mouth nightly as needed for Insomnia. (Patient not taking: Reported on 3/31/2025)    apixaban (ELIQUIS) 5 mg Tab Take 1 tablet (5 mg total) by mouth 2 (two) times daily.    aspirin (ECOTRIN) 81 MG EC tablet Take 1 tablet by mouth Daily.     calcitRIOL (ROCALTROL) 0.25 MCG Cap Take 1 capsule (0.25 mcg total) by mouth once daily.    cholestyramine (QUESTRAN) 4 gram packet Take 1 packet (4 g total) by mouth 2 (two) times daily. for 9 days    famotidine (PEPCID) 20 MG tablet TAKE ONE TABLET BY MOUTH EVERY EVENING    ferrous sulfate (FEROSUL) 325 mg (65 mg iron) Tab tablet Take 1 tablet (325 mg total) by mouth daily with breakfast.    fish oil-omega-3 fatty acids 300-1,000 mg capsule Take 1 g by mouth once daily.    furosemide (LASIX) 40 MG tablet Take 1 tablet (40 mg total) by mouth once daily.    HYDROcodone-acetaminophen (NORCO) 5-325 mg per tablet Take 1 tablet by mouth every 6 (six) hours as needed for Pain. (Patient not taking: Reported on 3/31/2025)    insulin aspart U-100 (NOVOLOG FLEXPEN U-100 INSULIN) 100 unit/mL (3 mL) InPn pen Inject 25 Units into the skin 3 (three) times daily with meals.    insulin aspart U-100 (NOVOLOG) 100  "unit/mL (3 mL) InPn pen Inject 0-10 Units into the skin before meals and at bedtime as needed (Hyperglycemia). **MODERATE CORRECTION DOSE**  Blood Glucose  mg/dL                  Pre-meal                2200  151-200                2 units                    1 unit  201-250                4 units                    2 units  251-300                6 units                    3 units  301-350                8 units                    4 units  >350                     10 units                  5 units  Administer subcutaneously if needed at times designated by monitoring  schedule.  DO NOT HOLD correction dose insulin for patients who are  NPO.    "HIGH ALERT MEDICATION" - Administer with meals or TF/TPN.    insulin glargine U-100, Lantus, (LANTUS SOLOSTAR U-100 INSULIN) 100 unit/mL (3 mL) InPn pen Inject 15 Units into the skin 2 (two) times daily.    ketoconazole (NIZORAL) 200 mg Tab Take ½ tablet (100 mg total) by mouth once daily.    metoprolol succinate (TOPROL-XL) 25 MG 24 hr tablet Take 1 tablet (25 mg total) by mouth once daily.    multivitamin (THERAGRAN) tablet Take 1 tablet by mouth once daily.    mycophenolate (CELLCEPT) 250 mg Cap Take 2 capsules (500 mg total) by mouth 2 (two) times daily.    ondansetron (ZOFRAN-ODT) 4 MG TbDL Dissolve 1 tablet (4 mg total) by mouth every 8 (eight) hours as needed (Nausea/vomiting). (Patient not taking: Reported on 3/31/2025)    predniSONE (DELTASONE) 5 MG tablet Take 1 tablet (5 mg total) by mouth once daily.    rosuvastatin (CRESTOR) 40 MG Tab Take 1 tablet (40 mg total) by mouth once daily.    [Paused] sacubitriL-valsartan (ENTRESTO)  mg per tablet Take 1 tablet by mouth 2 (two) times daily. (Patient not taking: Reported on 3/31/2025)    semaglutide (OZEMPIC) 2 mg/dose (8 mg/3 mL) PnIj Inject 2 mg into the skin every 7 days.    sodium bicarbonate 650 MG tablet Take 1 tablet (650 mg total) by mouth 2 (two) times daily.    tacrolimus (PROGRAF) 1 MG Cap Take 4 " capsules (4mg) every morning AND take 3 capsules (3mg) by mouth every evening    triamcinolone acetonide 0.1% (KENALOG) 0.1 % ointment Apply topically 2 (two) times daily. Use on bilateral lower legs.     Family History       Problem Relation (Age of Onset)    Diabetes Mother, Sister, Maternal Grandmother    Heart failure Mother, Father    Hypertension Mother    Kidney disease Sister          Tobacco Use    Smoking status: Former     Current packs/day: 0.00     Types: Cigarettes     Quit date: 2013     Years since quittin.8     Passive exposure: Past    Smokeless tobacco: Former     Quit date: 2013    Tobacco comments:     used marijuana since 4693-2208, stopped after started dialysis   Substance and Sexual Activity    Alcohol use: No     Alcohol/week: 0.0 standard drinks of alcohol    Drug use: Not Currently     Comment:      Sexual activity: Never     Review of Systems   All other systems reviewed and are negative.    Objective:     Vital Signs (Most Recent):  Temp: 99 °F (37.2 °C) (25 0002)  Pulse: 95 (25 0002)  Resp: 17 (25 0002)  BP: 104/60 (25 0002)  SpO2: 96 % (25 0002) Vital Signs (24h Range):  Temp:  [99 °F (37.2 °C)-103 °F (39.4 °C)] 99 °F (37.2 °C)  Pulse:  [] 95  Resp:  [17-33] 17  SpO2:  [96 %-98 %] 96 %  BP: (104-157)/(50-73) 104/60     Weight: 106.6 kg (235 lb 0.2 oz)  Body mass index is 31.87 kg/m².     Physical Exam  Vitals reviewed.   Constitutional:       General: He is not in acute distress.     Appearance: Normal appearance. He is obese. He is not ill-appearing, toxic-appearing or diaphoretic.   HENT:      Head: Normocephalic and atraumatic.      Right Ear: External ear normal.      Left Ear: External ear normal.      Nose: Nose normal. No congestion or rhinorrhea.      Mouth/Throat:      Mouth: Mucous membranes are moist.      Pharynx: Oropharynx is clear. No oropharyngeal exudate or posterior oropharyngeal erythema.   Eyes:      General: No  scleral icterus.     Extraocular Movements: Extraocular movements intact.      Conjunctiva/sclera: Conjunctivae normal.      Pupils: Pupils are equal, round, and reactive to light.   Neck:      Vascular: No carotid bruit.   Cardiovascular:      Rate and Rhythm: Normal rate and regular rhythm.      Pulses: Normal pulses.      Heart sounds: Normal heart sounds. No murmur heard.     No friction rub. No gallop.   Pulmonary:      Effort: Pulmonary effort is normal. No respiratory distress.      Breath sounds: Normal breath sounds. No stridor. No wheezing, rhonchi or rales.   Chest:      Chest wall: No tenderness.   Abdominal:      General: Abdomen is flat. Bowel sounds are normal. There is no distension.      Palpations: Abdomen is soft. There is no mass.      Tenderness: There is no abdominal tenderness. There is no guarding or rebound.      Hernia: No hernia is present.   Musculoskeletal:         General: Swelling and tenderness present. No deformity or signs of injury. Normal range of motion.      Cervical back: Normal range of motion and neck supple. No rigidity or tenderness.      Right lower leg: Edema present.      Left lower leg: Edema present.      Comments: TTP noted throughout left knee with a associated warmth and swelling noted throughout left lower extremity.  Negative TTP noted throughout entire spine both midline or paraspinally.    Lymphadenopathy:      Cervical: No cervical adenopathy.   Skin:     General: Skin is warm and dry.      Capillary Refill: Capillary refill takes less than 2 seconds.      Coloration: Skin is not jaundiced or pale.      Findings: Erythema present. No bruising, lesion or rash.      Comments: Bilateral chronic venous stasis present.  Erythema noted throughout left knee extending down left lower extremity.   Neurological:      Mental Status: He is alert and oriented to person, place, and time.      Cranial Nerves: No cranial nerve deficit.      Sensory: No sensory deficit.       Motor: Weakness present.      Coordination: Coordination normal.      Comments: Patient unable to perform left leg raise however has 4/5 strength upon left hip flexion as well as left plantar/dorsiflexion/extension.  Patient with 3/5 right leg raise present and 4/5 right hip flexion and right foot plantar/dorsi flexion/extension.  Bilateral upper extremities yielding 5/5 strength throughout.  Sensation to light touch grossly intact throughout.   Psychiatric:         Mood and Affect: Mood normal.         Behavior: Behavior normal.         Thought Content: Thought content normal.         Judgment: Judgment normal.              CRANIAL NERVES     CN III, IV, VI   Pupils are equal, round, and reactive to light.       Significant Labs: All pertinent labs within the past 24 hours have been reviewed.    Significant Imaging: I have reviewed all pertinent imaging results/findings within the past 24 hours.    LABS:  Recent Results (from the past 24 hours)   POCT glucose    Collection Time: 04/01/25  5:47 PM   Result Value Ref Range    POCT Glucose 67 (L) 70 - 110 mg/dL   Comprehensive metabolic panel    Collection Time: 04/01/25  6:04 PM   Result Value Ref Range    Sodium 142 136 - 145 mmol/L    Potassium 2.8 (L) 3.5 - 5.1 mmol/L    Chloride 104 95 - 110 mmol/L    CO2 27 23 - 29 mmol/L    Glucose 61 (L) 70 - 110 mg/dL    BUN 27 (H) 8 - 23 mg/dL    Creatinine 1.9 (H) 0.5 - 1.4 mg/dL    Calcium 8.1 (L) 8.7 - 10.5 mg/dL    Protein Total 5.9 (L) 6.0 - 8.4 gm/dL    Albumin 2.3 (L) 3.5 - 5.2 g/dL    Bilirubin Total 1.1 (H) 0.1 - 1.0 mg/dL    ALP 96 40 - 150 unit/L    AST 19 11 - 45 unit/L    ALT 14 10 - 44 unit/L    Anion Gap 11 8 - 16 mmol/L    eGFR 37 (L) >60 mL/min/1.73/m2   Lactic acid, plasma #1    Collection Time: 04/01/25  6:04 PM   Result Value Ref Range    Lactic Acid Level 1.0 0.5 - 2.2 mmol/L   Troponin I    Collection Time: 04/01/25  6:04 PM   Result Value Ref Range    Troponin-I 0.152 (H) <=0.026 ng/mL   Procalcitonin     Collection Time: 04/01/25  6:04 PM   Result Value Ref Range    Procalcitonin 1.60 (H) <0.25 ng/mL   CBC with Differential    Collection Time: 04/01/25  6:04 PM   Result Value Ref Range    WBC 4.81 3.90 - 12.70 K/uL    RBC 3.82 (L) 4.60 - 6.20 M/uL    HGB 9.6 (L) 14.0 - 18.0 gm/dL    HCT 31.8 (L) 40.0 - 54.0 %    MCV 83 82 - 98 fL    MCH 25.1 (L) 27.0 - 31.0 pg    MCHC 30.2 (L) 32.0 - 36.0 g/dL    RDW 17.8 (H) 11.5 - 14.5 %    Platelet Count 176 150 - 450 K/uL    MPV 11.4 9.2 - 12.9 fL    Nucleated RBC 0 <=0 /100 WBC   Manual Differential    Collection Time: 04/01/25  6:04 PM   Result Value Ref Range    Gran # (ANC) 3.4 K/uL    Segmented Neutrophil % 70.0 38.0 - 73.0 %    Lymphocyte % 20.0 18.0 - 48.0 %    Monocyte % 10.0 4.0 - 15.0 %    Platelet Estimate Appears Normal      Anisocytosis Slight     Poik Slight     Ovalocytes Occasional     Large/Giant Platelets Present    Magnesium    Collection Time: 04/01/25  6:04 PM   Result Value Ref Range    Magnesium  1.1 (L) 1.6 - 2.6 mg/dL   Urinalysis, Reflex to Urine Culture    Collection Time: 04/01/25  7:09 PM    Specimen: Urine   Result Value Ref Range    Color, UA Yellow Straw, Nikki, Yellow, Light-Orange    Appearance, UA Clear Clear    pH, UA 6.0 5.0 - 8.0    Spec Grav UA 1.020 1.005 - 1.030    Protein, UA 1+ (A) Negative    Glucose, UA 1+ (A) Negative    Ketones, UA Negative Negative    Bilirubin, UA Negative Negative    Blood, UA Trace (A) Negative    Nitrites, UA Negative Negative    Urobilinogen, UA Negative <2.0 EU/dL    Leukocyte Esterase, UA Negative Negative   Urinalysis Microscopic    Collection Time: 04/01/25  7:09 PM   Result Value Ref Range    RBC, UA 0 0 - 4 /HPF    WBC, UA 1 0 - 5 /HPF    Bacteria, UA Rare None, Rare, Occasional /HPF    Hyaline Casts, UA 0 0 - 1 /LPF    Microscopic Comment     Lactic acid, plasma #2    Collection Time: 04/01/25 10:12 PM   Result Value Ref Range    Lactic Acid Level 0.7 0.5 - 2.2 mmol/L   Basic metabolic panel     Collection Time: 04/01/25 11:34 PM   Result Value Ref Range    Sodium 140 136 - 145 mmol/L    Potassium 3.3 (L) 3.5 - 5.1 mmol/L    Chloride 105 95 - 110 mmol/L    CO2 24 23 - 29 mmol/L    Glucose 92 70 - 110 mg/dL    BUN 27 (H) 8 - 23 mg/dL    Creatinine 1.8 (H) 0.5 - 1.4 mg/dL    Calcium 8.1 (L) 8.7 - 10.5 mg/dL    Anion Gap 11 8 - 16 mmol/L    eGFR 40 (L) >60 mL/min/1.73/m2   POCT glucose    Collection Time: 04/02/25 12:16 AM   Result Value Ref Range    POCT Glucose 98 70 - 110 mg/dL       RADIOLOGY  X-Ray Chest AP Portable  Result Date: 4/1/2025  Exam: XR CHEST AP PORTABLE Comparison: 03/13/2025 Clinical Indication:  Sepsis Findings: Heart size and pulmonary vasculature are unremarkable.  No focal consolidation, pleural effusions or pneumothorax. No acute angulated or displaced fractures. Stent in the subclavian artery on the right.     No acute cardiopulmonary disease. Finalized on: 4/1/2025 7:26 PM By:  Raudel Mathew [facility]# [id number]      2025-04-01 19:28:[id number]     [facility]    Echo Saline Bubble? No  Result Date: 3/14/2025    Left Ventricle: The left ventricle is normal in size. Mildly increased ventricular mass. Mildly increased wall thickness. There is mild concentric hypertrophy. Normal wall motion. Septal motion is consistent with bundle branch block. There is moderately reduced systolic function with a visually estimated ejection fraction of 35 - 40%. Grade I diastolic dysfunction.   Right Ventricle: The right ventricle is normal in size. Wall thickness is normal. Systolic function is normal.   Left Atrium: Mildly dilated   Aorta: Aortic annulus is mildly dilated measuring 4.01 cm. Ascending aorta is normal measuring 3.69 cm.   Pulmonary Artery: The estimated pulmonary artery systolic pressure is 24 mmHg.   IVC/SVC: Normal venous pressure at 3 mmHg.     US Lower Extremity Veins Bilateral  Result Date: 3/14/2025  EXAMINATION: US LOWER EXTREMITY VEINS BILATERAL CLINICAL HISTORY: Other specified soft tissue  disorders TECHNIQUE: Duplex and color flow Doppler and dynamic compression was performed of the bilateral lower extremity veins was performed. COMPARISON: 01/14/2025 FINDINGS: Right thigh veins: The common femoral, femoral, popliteal, upper greater saphenous, and deep femoral veins are patent and free of thrombus. The veins are normally compressible and have normal phasic flow and augmentation response. Right calf veins: The visualized calf veins are patent. Left thigh veins: The common femoral, femoral, popliteal, upper greater saphenous, and deep femoral veins are patent and free of thrombus. The veins are normally compressible and have normal phasic flow and augmentation response. Left calf veins: The visualized calf veins are patent. Miscellaneous: Diffuse soft tissue swelling.     No evidence of deep venous thrombosis in either lower extremity. Electronically signed by: Andrew Crespo MD Date:    03/14/2025 Time:    08:36    X-Ray Chest AP Portable  Result Date: 3/14/2025  EXAMINATION: XR CHEST AP PORTABLE CLINICAL HISTORY: Edema; FINDINGS: Single view of the chest.  Comparison 02/26/2025 Cardiac silhouette is normal.  The lungs demonstrate no evidence of active disease.  No evidence of pleural effusion or pneumothorax.  Bones appear intact.  Moderate degenerative changes and moderate atherosclerotic disease.  Right axillary stent.  Low lung volumes.     No acute process seen. Electronically signed by: Andrew Crespo MD Date:    03/14/2025 Time:    07:19      EKG    MICROBIOLOGY    MDM    Assessment/Plan:     Assessment & Plan  Cellulitis of left lower extremity      Chronic venous insufficiency of lower extremity      Sepsis  This patient does have evidence of infective focus  My overall impression is sepsis.  Source: Skin and Soft Tissue (location left leg cellulitis, gout of left knee)  Antibiotics given-   Antibiotics (72h ago, onward)      Start     Stop Route Frequency Ordered    04/02/25 0600  ceFEPIme  injection 2 g         -- IV Every 12 hours (non-standard times) 04/01/25 2036          Latest lactate reviewed-  Recent Labs   Lab 04/01/25  2212   LACTATE 0.7     Organ dysfunction indicated by Acute kidney injury    Fluid challenge Ideal Body Weight- The patient's ideal body weight is Ideal body weight: 77.6 kg (171 lb 1.2 oz) which will be used to calculate fluid bolus of 30 ml/kg for treatment of septic shock.      Post- resuscitation assessment No - Post resuscitation assessment not needed     Will Not start Pressors- Levophed for MAP of 65    Source control achieved by: Cefepime    Follow up uric acid level and consider ortho consult for left knee aspiration to fully rule out gout flare.    Weakness of both lower extremities  Patient reported worsening bilateral lower extremity weakness times 2-3 months in which he reported symptoms began acutely after his right leg gave out going to the restroom.  Patient denies endorsing any back pain, experiencing ground level fall/trauma, but does report fecal incontinence when urinating.  Patient has since been bed-bound in his not been seen/evaluated by a physician for these symptoms.  Plan:  -replete electrolytes  -consider obtaining spine imaging for further evaluation  -PT/OT    Hypokalemia  Patient's most recent potassium results are listed below.   Recent Labs     04/01/25  1804 04/01/25  2334 04/02/25  0420   K 2.8* 3.3* 3.3*     Plan  -Replete potassium per protocol  -Monitor potassium Daily  -Patient's hypokalemia is  currently undergoing medical management    Hypomagnesemia  Patient has Abnormal Magnesium: hypomagnesemia. Will continue to monitor electrolytes closely. Will replace the affected electrolytes and repeat labs to be done after interventions completed. The patient's magnesium results have been reviewed and are listed below.  Recent Labs   Lab 04/01/25  1804   MG 1.1*     Chronic combined systolic and diastolic congestive heart failure  Patient has  "Combined Systolic and Diastolic heart failure that is Chronic. On presentation their CHF was well compensated. Most recent BNP and echo results are listed below.  No results for input(s): "BNP" in the last 72 hours.  Latest ECHO  Results for orders placed during the hospital encounter of 03/13/25    Echo Saline Bubble? No    Interpretation Summary    Left Ventricle: The left ventricle is normal in size. Mildly increased ventricular mass. Mildly increased wall thickness. There is mild concentric hypertrophy. Normal wall motion. Septal motion is consistent with bundle branch block. There is moderately reduced systolic function with a visually estimated ejection fraction of 35 - 40%. Grade I diastolic dysfunction.    Right Ventricle: The right ventricle is normal in size. Wall thickness is normal. Systolic function is normal.    Left Atrium: Mildly dilated    Aorta: Aortic annulus is mildly dilated measuring 4.01 cm. Ascending aorta is normal measuring 3.69 cm.    Pulmonary Artery: The estimated pulmonary artery systolic pressure is 24 mmHg.    IVC/SVC: Normal venous pressure at 3 mmHg.    Current Heart Failure Medications       Plan  -Monitor strict I&Os and daily weights.    -Place on telemetry  -Low sodium diet  -Place on fluid restriction of 1.5 L.   -Cardiology has not been consulted  -The patient's volume status is at their baseline  -Continue home medications    Hypertension  Chronic, uncontrolled.  Latest blood pressure and vitals reviewed-   Temp:  [99 °F (37.2 °C)-103 °F (39.4 °C)]   Pulse:  []   Resp:  [17-33]   BP: (104-157)/(50-73)   SpO2:  [96 %-98 %] .   Home meds for hypertension were reviewed and noted below.   Hypertension Medications              furosemide (LASIX) 40 MG tablet Take 1 tablet (40 mg total) by mouth once daily.    metoprolol succinate (TOPROL-XL) 25 MG 24 hr tablet Take 1 tablet (25 mg total) by mouth once daily.    sacubitriL-valsartan (ENTRESTO)  mg per tablet ([Paused] " since 3/11/2025 12:59 PM) Take 1 tablet by mouth 2 (two) times daily.     While in the hospital, will manage blood pressure as follows; Continue home antihypertensive regimen    Will utilize p.r.n. blood pressure medication only if patient's blood pressure greater than  180/110 and he develops symptoms such as worsening chest pain or shortness of breath.    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  Last A1c reviewed-   Lab Results   Component Value Date    HGBA1C 5.7 (H) 12/17/2024     Most recent fingerstick glucose reviewed-   Recent Labs   Lab 04/01/25  1747 04/02/25  0016 04/02/25  0618   POCTGLUCOSE 67* 98 109     Current correctional scale  Low  Titrate as needed  anti-hyperglycemic dose as follows-   Antihyperglycemics (From admission, onward)      Start     Stop Route Frequency Ordered    04/01/25 2134  insulin aspart U-100 pen 0-5 Units         -- SubQ Before meals & nightly PRN 04/01/25 2035          Plan:  -SSI  -A1c  -Accu-checks  -Hold oral hypoglycemics while patient is in the hospital  -Continue home long-acting insulin at 20% decrease, titrate up as needed  -Hypoglycemic protocol      Anemia, chronic disease  Anemia is likely due to chronic disease due to Chronic Kidney Disease. Most recent hemoglobin and hematocrit are listed below.  Recent Labs     04/01/25  1804 04/02/25  0420   HGB 9.6* 9.1*   HCT 31.8* 31.4*     Plan  -Monitor serial CBC: Daily  -Transfuse PRBC if patient becomes hemodynamically unstable, symptomatic or H/H drops below 7/21.  -Patient has not received any PRBC transfusions to date  -Patient's anemia is currently stable    Coronary artery disease of native artery of native heart with stable angina pectoris  Patient with known CAD, which is controlled.  EKG negative for signs of acute ischemia.  Troponin elevated at 0.15 to  likely secondary to demand ischemia in setting of sepsis and CHF. Will continue  home medications  and monitor for S/Sx of angina/ACS. Continue to monitor on telemetry.    Chronic immunosuppression with Prograf, MMF and prednisone  Patient currently on Prograf, CellCept, and mycophenolate.  Plan:  -continue Prograf and CellCept  -holding mycophenolate    Hyperlipidemia associated with type 2 diabetes mellitus  Patient is chronically on statin.will continue for now. Last Lipid Panel:   Lab Results   Component Value Date    CHOL 175 12/17/2024    HDL 50 12/17/2024    LDLCALC 98.2 12/17/2024    TRIG 134 12/17/2024    CHOLHDL 28.6 12/17/2024     Plan:  -Continue home medication  -low fat/low calorie diet    History of DVT (deep vein thrombosis)  Currently on Eliquis outpatient.  Plan:  -continue home medication  -monitor H/H    Obstructive sleep apnea  Currently on CPAP outpatient.  Plan:  -continue CPAP q.h.s.    Obesity (BMI 30-39.9)  Body mass index is 31.87 kg/m². Morbid obesity complicates all aspects of disease management from diagnostic modalities to treatment. Weight loss encouraged and health benefits explained to patient.       VTE Risk Mitigation (From admission, onward)           Ordered     Reason for No Pharmacological VTE Prophylaxis  Once        Question:  Reasons:  Answer:  Already adequately anticoagulated on oral Anticoagulants    04/01/25 2035     IP VTE HIGH RISK PATIENT  Once         04/01/25 2035     Place sequential compression device  Until discontinued         04/01/25 2035                  //Core Measures   -DVT proph: SCDs, Eliquis  -Code status: Full    -Surrogate: none provided       Components of this note were documented using a voice recognition system and are subject to errors not corrected at the time the document was proof read. Please contact the author for any clarifications.        Bernardo Cao MD  Department of Hospital Medicine  [facility] Tele[facility] ([facility]

## 2025-04-02 NOTE — PLAN OF CARE
~ NAEON.     ~ AOx4. Able to verbalize needs & follow commands.      ~ POC reviewed with pt. Interventions implemented as appropriate.      ~ VS stable.      ~ ST on tele-monitor, box # [id number].    ~ On room air.      ~ LAC 20g PIV. Flushes w/out difficutly; saline locked. Dsg cdi. Integrity maintained.      ~ Receiving IV abx. No adverse reactions noted.    ~ Skin to BLE dusky & motted. Edema to LLE.    ~ Heart healthy, renal, consistent carb diet w/ 2L fluid restriction. No n/v/d or any other GI complaints.    ~ Incontinent of b/b. LBM 4/2.        ~ No c/o pain.      ~ Generalized weakness; moderately impaired. Wheel chair bound. Activity ad madhav.     ~ CBG ac/hs; no coverage required. Dexcom on KENNETH.    ~ SCDs refused; education provided.      ~ Able to reposition in bed independently. Frequent position changes encouraged. Educated pt on skin integrity & breakdown prevention, as well as s/sx of pressure injury;  verbalized understanding.      ~ NADN. Resting quietly in bed.     ~ Free of falls. Hourly rounding complete.     ~ All safety measures remain in place. SR up x2; bed low & locked. Bed alarm on & audible; educated on increased risk for falls. Call light w/in reach.     ~ Hourly monitoring continues throughout shift.    ~ Pt remains stable & w/in expected parameters w/ no further changes noted at this time. Will continue to observe & report any changes to appropriate provider.    ~ Chart check complete.

## 2025-04-03 PROBLEM — D69.6 THROMBOCYTOPENIA: Status: ACTIVE | Noted: 2025-04-03

## 2025-04-03 LAB
ABSOLUTE NEUTROPHIL MANUAL (OHS): 1.5 K/UL
ANION GAP (OHS): 12 MMOL/L (ref 8–16)
ANISOCYTOSIS BLD QL SMEAR: SLIGHT
BASOPHILS NFR BLD MANUAL: 2 %
BUN SERPL-MCNC: 35 MG/DL (ref 8–23)
CALCIUM SERPL-MCNC: 8 MG/DL (ref 8.7–10.5)
CHLORIDE SERPL-SCNC: 102 MMOL/L (ref 95–110)
CO2 SERPL-SCNC: 21 MMOL/L (ref 23–29)
CREAT SERPL-MCNC: 1.9 MG/DL (ref 0.5–1.4)
DACRYOCYTES BLD QL SMEAR: ABNORMAL
EOSINOPHIL NFR BLD MANUAL: 3 % (ref 0–8)
ERYTHROCYTE [DISTWIDTH] IN BLOOD BY AUTOMATED COUNT: 17.9 % (ref 11.5–14.5)
GFR SERPLBLD CREATININE-BSD FMLA CKD-EPI: 37 ML/MIN/1.73/M2
GLUCOSE SERPL-MCNC: 186 MG/DL (ref 70–110)
HCT VFR BLD AUTO: 27.7 % (ref 40–54)
HGB BLD-MCNC: 7.9 GM/DL (ref 14–18)
LYMPHOCYTES NFR BLD MANUAL: 24 % (ref 18–48)
MAGNESIUM SERPL-MCNC: 1.7 MG/DL (ref 1.6–2.6)
MCH RBC QN AUTO: 24.2 PG (ref 27–31)
MCHC RBC AUTO-ENTMCNC: 28.5 G/DL (ref 32–36)
MCV RBC AUTO: 85 FL (ref 82–98)
MONOCYTES NFR BLD MANUAL: 13 % (ref 4–15)
NEUTROPHILS NFR BLD MANUAL: 56 % (ref 38–73)
NEUTS BAND NFR BLD MANUAL: 2 %
NUCLEATED RBC (/100WBC) (OHS): 0 /100 WBC
OVALOCYTES BLD QL SMEAR: ABNORMAL
PLATELET # BLD AUTO: 126 K/UL (ref 150–450)
PLATELET BLD QL SMEAR: ABNORMAL
PMV BLD AUTO: 11.3 FL (ref 9.2–12.9)
POCT GLUCOSE: 191 MG/DL (ref 70–110)
POCT GLUCOSE: 197 MG/DL (ref 70–110)
POCT GLUCOSE: 222 MG/DL (ref 70–110)
POCT GLUCOSE: 255 MG/DL (ref 70–110)
POIKILOCYTOSIS BLD QL SMEAR: SLIGHT
POTASSIUM SERPL-SCNC: 3.3 MMOL/L (ref 3.5–5.1)
RBC # BLD AUTO: 3.27 M/UL (ref 4.6–6.2)
SODIUM SERPL-SCNC: 135 MMOL/L (ref 136–145)
WBC # BLD AUTO: 2.6 K/UL (ref 3.9–12.7)

## 2025-04-03 PROCEDURE — 25000003 PHARM REV CODE 250: Performed by: HOSPITALIST

## 2025-04-03 PROCEDURE — 97530 THERAPEUTIC ACTIVITIES: CPT

## 2025-04-03 PROCEDURE — 63600175 PHARM REV CODE 636 W HCPCS: Performed by: HOSPITALIST

## 2025-04-03 PROCEDURE — 83735 ASSAY OF MAGNESIUM: CPT | Performed by: HOSPITALIST

## 2025-04-03 PROCEDURE — 80048 BASIC METABOLIC PNL TOTAL CA: CPT | Performed by: HOSPITALIST

## 2025-04-03 PROCEDURE — 25000003 PHARM REV CODE 250: Performed by: NURSE PRACTITIONER

## 2025-04-03 PROCEDURE — 27000207 HC ISOLATION

## 2025-04-03 PROCEDURE — 21400001 HC TELEMETRY ROOM

## 2025-04-03 PROCEDURE — 97530 THERAPEUTIC ACTIVITIES: CPT | Mod: CQ

## 2025-04-03 PROCEDURE — 63600175 PHARM REV CODE 636 W HCPCS: Performed by: INTERNAL MEDICINE

## 2025-04-03 PROCEDURE — A9698 NON-RAD CONTRAST MATERIALNOC: HCPCS | Performed by: HOSPITALIST

## 2025-04-03 PROCEDURE — 92610 EVALUATE SWALLOWING FUNCTION: CPT

## 2025-04-03 PROCEDURE — 97110 THERAPEUTIC EXERCISES: CPT

## 2025-04-03 PROCEDURE — 36415 COLL VENOUS BLD VENIPUNCTURE: CPT | Performed by: HOSPITALIST

## 2025-04-03 PROCEDURE — 85027 COMPLETE CBC AUTOMATED: CPT | Performed by: HOSPITALIST

## 2025-04-03 PROCEDURE — 25500020 PHARM REV CODE 255: Performed by: HOSPITALIST

## 2025-04-03 PROCEDURE — 97535 SELF CARE MNGMENT TRAINING: CPT

## 2025-04-03 PROCEDURE — 63600175 PHARM REV CODE 636 W HCPCS: Performed by: NURSE PRACTITIONER

## 2025-04-03 PROCEDURE — 99233 SBSQ HOSP IP/OBS HIGH 50: CPT | Mod: ,,, | Performed by: INTERNAL MEDICINE

## 2025-04-03 RX ORDER — INSULIN GLARGINE 100 [IU]/ML
10 INJECTION, SOLUTION SUBCUTANEOUS 2 TIMES DAILY
Status: DISCONTINUED | OUTPATIENT
Start: 2025-04-03 | End: 2025-04-09

## 2025-04-03 RX ORDER — METOPROLOL SUCCINATE 25 MG/1
25 TABLET, EXTENDED RELEASE ORAL DAILY
Status: DISCONTINUED | OUTPATIENT
Start: 2025-04-03 | End: 2025-04-09

## 2025-04-03 RX ORDER — FAMOTIDINE 20 MG/1
20 TABLET, FILM COATED ORAL NIGHTLY
Status: DISCONTINUED | OUTPATIENT
Start: 2025-04-03 | End: 2025-04-04

## 2025-04-03 RX ORDER — TACROLIMUS 1 MG/1
4 CAPSULE ORAL 2 TIMES DAILY
Status: DISCONTINUED | OUTPATIENT
Start: 2025-04-03 | End: 2025-04-08

## 2025-04-03 RX ORDER — LANOLIN ALCOHOL/MO/W.PET/CERES
1 CREAM (GRAM) TOPICAL DAILY
Status: DISCONTINUED | OUTPATIENT
Start: 2025-04-03 | End: 2025-04-11 | Stop reason: HOSPADM

## 2025-04-03 RX ORDER — MAGNESIUM SULFATE 1 G/100ML
1 INJECTION INTRAVENOUS ONCE
Status: COMPLETED | OUTPATIENT
Start: 2025-04-03 | End: 2025-04-03

## 2025-04-03 RX ORDER — ASPIRIN 81 MG/1
81 TABLET ORAL DAILY
Status: DISCONTINUED | OUTPATIENT
Start: 2025-04-03 | End: 2025-04-11 | Stop reason: HOSPADM

## 2025-04-03 RX ORDER — SODIUM BICARBONATE 650 MG/1
650 TABLET ORAL 2 TIMES DAILY
Status: DISCONTINUED | OUTPATIENT
Start: 2025-04-03 | End: 2025-04-11 | Stop reason: HOSPADM

## 2025-04-03 RX ORDER — POTASSIUM CHLORIDE 7.45 MG/ML
10 INJECTION INTRAVENOUS
Status: COMPLETED | OUTPATIENT
Start: 2025-04-03 | End: 2025-04-03

## 2025-04-03 RX ORDER — FUROSEMIDE 40 MG/1
40 TABLET ORAL DAILY
Status: DISCONTINUED | OUTPATIENT
Start: 2025-04-03 | End: 2025-04-08

## 2025-04-03 RX ORDER — PREDNISONE 5 MG/1
5 TABLET ORAL DAILY
Status: DISCONTINUED | OUTPATIENT
Start: 2025-04-03 | End: 2025-04-11 | Stop reason: HOSPADM

## 2025-04-03 RX ORDER — CALCITRIOL 0.25 UG/1
0.25 CAPSULE ORAL DAILY
Status: DISCONTINUED | OUTPATIENT
Start: 2025-04-03 | End: 2025-04-10

## 2025-04-03 RX ADMIN — POTASSIUM CHLORIDE 10 MEQ: 10 INJECTION, SOLUTION INTRAVENOUS at 03:04

## 2025-04-03 RX ADMIN — CEFEPIME 2 G: 2 INJECTION, POWDER, FOR SOLUTION INTRAVENOUS at 05:04

## 2025-04-03 RX ADMIN — FAMOTIDINE 20 MG: 20 TABLET, FILM COATED ORAL at 09:04

## 2025-04-03 RX ADMIN — ALLOPURINOL 50 MG: 300 TABLET ORAL at 12:04

## 2025-04-03 RX ADMIN — TACROLIMUS 4 MG: 1 CAPSULE ORAL at 06:04

## 2025-04-03 RX ADMIN — APIXABAN 5 MG: 2.5 TABLET, FILM COATED ORAL at 12:04

## 2025-04-03 RX ADMIN — INSULIN GLARGINE 10 UNITS: 100 INJECTION, SOLUTION SUBCUTANEOUS at 09:04

## 2025-04-03 RX ADMIN — CALCITRIOL CAPSULES 0.25 MCG 0.25 MCG: 0.25 CAPSULE ORAL at 12:04

## 2025-04-03 RX ADMIN — BARIUM SULFATE 176 G: 960 POWDER, FOR SUSPENSION ORAL at 02:04

## 2025-04-03 RX ADMIN — MAGNESIUM SULFATE IN DEXTROSE 1 G: 10 INJECTION, SOLUTION INTRAVENOUS at 04:04

## 2025-04-03 RX ADMIN — SODIUM BICARBONATE 650 MG: 650 TABLET ORAL at 12:04

## 2025-04-03 RX ADMIN — POTASSIUM CHLORIDE 10 MEQ: 10 INJECTION, SOLUTION INTRAVENOUS at 04:04

## 2025-04-03 RX ADMIN — METOPROLOL SUCCINATE 25 MG: 25 TABLET, EXTENDED RELEASE ORAL at 12:04

## 2025-04-03 RX ADMIN — CEFEPIME 2 G: 2 INJECTION, POWDER, FOR SOLUTION INTRAVENOUS at 06:04

## 2025-04-03 RX ADMIN — APIXABAN 5 MG: 2.5 TABLET, FILM COATED ORAL at 09:04

## 2025-04-03 RX ADMIN — FUROSEMIDE 40 MG: 40 TABLET ORAL at 12:04

## 2025-04-03 RX ADMIN — INSULIN ASPART 2 UNITS: 100 INJECTION, SOLUTION INTRAVENOUS; SUBCUTANEOUS at 05:04

## 2025-04-03 RX ADMIN — HYDROCODONE BITARTRATE AND ACETAMINOPHEN 1 TABLET: 5; 325 TABLET ORAL at 08:04

## 2025-04-03 RX ADMIN — PREDNISONE 5 MG: 5 TABLET ORAL at 12:04

## 2025-04-03 RX ADMIN — INSULIN ASPART 1 UNITS: 100 INJECTION, SOLUTION INTRAVENOUS; SUBCUTANEOUS at 08:04

## 2025-04-03 RX ADMIN — SODIUM BICARBONATE 650 MG: 650 TABLET ORAL at 09:04

## 2025-04-03 RX ADMIN — ASPIRIN 81 MG: 81 TABLET, COATED ORAL at 12:04

## 2025-04-03 RX ADMIN — INSULIN GLARGINE 10 UNITS: 100 INJECTION, SOLUTION SUBCUTANEOUS at 01:04

## 2025-04-03 RX ADMIN — FERROUS SULFATE TAB 325 MG (65 MG ELEMENTAL FE) 1 EACH: 325 (65 FE) TAB at 12:04

## 2025-04-03 NOTE — ASSESSMENT & PLAN NOTE
[facility]  Plan:  -continue home medication  -monitor H/H

## 2025-04-03 NOTE — PT/OT/SLP PROGRESS
Physical Therapy  Treatment    Mitch Whittaker   MRN: [id number]   Admitting Diagnosis: Cellulitis of left lower extremity    PT Received On: 04/03/25  PT Start Time: 0950     PT Stop Time: 1020    PT Total Time (min): 30 min       Billable Minutes:  Therapeutic Activity 30    Treatment Type: Treatment  PT/PTA: PTA     Number of PTA visits since last PT visit: 1       General Precautions: Standard, fall, special contact  Orthopedic Precautions: N/A  Braces: N/A  Respiratory Status: Room air    Spiritual, Cultural Beliefs, Synagogue Practices, Values that Affect Care: no    Subjective:  Completed Epic chart review prior to PT session.   Patient refused EOB or OOB mobility due to bilateral knee pain. Patient also complains of neck stiffness.     Pain/Comfort  Pain Rating 1:  (UNRATED BUT REPORTS PAIN IN B KNEES WITH ALL MOVEMENT)    Objective:   Patient found with: peripheral IV, telemetry    Supine AP x10 reps    Towel rolls placed under patients B ankles to promote passive and prolonged stretching into B knee extension  Cued quad sets x10 reps per LE to give more active stretch    Completed 2x5 reps of cross midline reaching with head turn and shoulder lift to promote increased active engagement during bed mobility.   Required consistent cues to give full effort and to include head turn in order to reach target. Also required extended rest breaks throughout session due to fatigue.     Educated patient on importance of increased tolerance to upright position and direct impact on CV endurance and strength. Patient encouraged to utilize elevated HOB to simulate chair position until able to safely complete chair T/F. Patient given a minimum goal of majority of the day to be spent in upright, especially with all meals. Encouraged patient to perform AROM TE to BLE throughout the day within all available planes of motion. Re enforced importance of utilizing call light to meet needs in room and not attempt to get up without  staff assistance. Questionable level of retention despite educating patient on mobility benefits multiple times throughout session.     AM-PAC 6 CLICK MOBILITY  How much help from another person does this patient currently need?   1 = Unable, Total/Dependent Assistance  2 = A lot, Maximum/Moderate Assistance  3 = A little, Minimum/Contact Guard/Supervision  4 = None, Modified [place]/Independent    Turning over in bed (including adjusting bedclothes, sheets and blankets)?: 1 (REF)  Sitting down on and standing up from a chair with arms (e.g., wheelchair, bedside commode, etc.): 1 (REF)  Moving from lying on back to sitting on the side of the bed?: 1 (REF)  Moving to and from a bed to a chair (including a wheelchair)?: 1 (REF)  Need to walk in hospital room?: 1 (REF)  Climbing 3-5 steps with a railing?: 1 (NT)  Basic Mobility Total Score: 6    AM-PAC Raw Score CMS G-Code Modifier Level of Impairment Assistance   6 % Total / Unable   7 - 9 CM 80 - 100% Maximal Assist   10 - 14 CL 60 - 80% Moderate Assist   15 - 19 CK 40 - 60% Moderate Assist   20 - 22 CJ 20 - 40% Minimal Assist   23 CI 1-20% SBA / CGA   24 CH 0% Independent/ Mod I     Patient left HOB elevated with call button in reach.    Assessment:  Mitch Whittaker is a 69 y.o. male with a medical diagnosis of Cellulitis of left lower extremity and presents with overall decline in functional mobility. Patient would continue to benefit from skilled PT to address functional limitations listed below in order to return to PLOF/decrease caregiver burden.     Rehab identified problem list/impairments: weakness, impaired endurance, impaired self care skills, impaired functional mobility, impaired balance, pain, decreased safety awareness, decreased lower extremity function, decreased upper extremity function, decreased coordination, impaired cognition, decreased ROM, impaired cardiopulmonary response to activity    Rehab potential is fair.    Activity  tolerance: Fair    Discharge recommendations: Moderate Intensity Therapy      Barriers to discharge:      Equipment recommendations: to be determined by next level of care     GOALS:   Multidisciplinary Problems       Physical Therapy Goals          Problem: Physical Therapy    Goal Priority Disciplines Outcome Interventions   Physical Therapy Goal     PT, PT/OT     Description: Pt will perform bed mobility with mod A in order to participate in EOB activity.  Pt will perform transfers with mod A in order to participate in OOB activity.  Pt will ambulate 50 ft mod I with LRAD in order to participate in daily tasks.   Pt will tolerate sitting OOB in chair x 2-4 hrs in order to participate in daily tasks.                        DME Justifications:  No DME recommended requiring DME justifications    PLAN:    Patient to be seen 3 x/week to address the above listed problems via gait training, therapeutic activities, therapeutic exercises, neuromuscular re-education  Plan of Care expires: 04/16/25  Plan of Care reviewed with: patient         04/03/2025

## 2025-04-03 NOTE — CONSULTS
[facility] Tele[facility] ([facility])  Wound Care    Patient Name:  Mitch Whittaker   MRN:  [id number]  Date: 4/3/2025  Diagnosis: Cellulitis of left lower extremity    History:     Past Medical History:   Diagnosis Date    Acquired renal cyst of left kidney     Anemia associated with chronic renal failure     CAD (coronary artery disease)     nonobstructive [facility] 9/14    CHF (congestive heart failure)     Chronic immunosuppression with Prograf and MMF 06/18/2015    Chronic venous insufficiency of lower extremity     CKD (chronic kidney disease) stage 3, GFR 30-59 ml/min     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 RNA NEGATIVE 5/2015, 2021, 2022    History of colon polyps     HPTH (hyperparathyroidism)     Hyperlipidemia     Hypertension associated with stage 3 chronic kidney disease due to type 2 diabetes mellitus     LBBB (left bundle branch block) 12/20/2021    Morbid obesity with BMI of 45.0-49.9, adult     Nephrolithiasis 6/7/2013    PCO (posterior capsular opacification), left 03/04/2019    Proteinuria     resolved s/p transplant    S/P kidney transplant     Sleep apnea     Type 2 diabetes, uncontrolled, with retinopathy     Type II diabetes mellitus with renal manifestations        Social History[1]    Precautions:     Allergies as of 04/01/2025 - Reviewed 04/01/2025   Allergen Reaction Noted    Lisinopril Other (See Comments) 04/12/2013    Actos  [pioglitazone] Other (See Comments) 04/12/2013    Metformin Other (See Comments) 04/12/2013       WO Assessment Details/Treatment       Consulted to evaluate wounds to BLE's.  Mr. Whittaker is a 70 yo male patient, well known to wound  "care team as we have followed his care Re:  venous ulceration to BLE's and previous PI to R heel (I last saw patient 3/14/25).  Re-admitted w/ pain to L leg---diagnosed w/ cellulitis of LLE.    PMH includes:   Acquired renal cyst of left kidney, Anemia associated with chronic renal failure, CAD (coronary artery disease), CHF (congestive heart failure), Chronic immunosuppression with Prograf and MMF (06/18/2015), Chronic venous insufficiency of lower extremity, CKD (chronic kidney disease) stage 3, GFR 30-59 ml/min, 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), Diabetic retinopathy, DM (diabetes mellitus), type 2 with complications (1994), Edema, End stage kidney disease, Gallbladder polyp, Heart failure, diastolic, due to HTN, Hemodialysis status, 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     This morning, patient is resting quietly in bed, awake and alert.  He denies any complaint, except requests "don't move my legs too quickly---it hurts".    Noted that the small venous ulcerations which were present on my last assessment have resolved.  Noted no edema to either leg @ present.  Remains w/ + hemosiderin staining to gaiter region of BLE's.  No open wound noted.  No exudate noted.  Noted B heels are intact without redness or skin breakdown noted.  Recommend:  application of Remedy moisturizer daily after bath & prn per nursing staff.  Ordered pressure injury prevention measures, including use of offloading heel boots to BLE's " @ all times when patient is in bed.  Would continue current low air-loss pump with Sizewise pressure redistribution mattress.      Assisted to turn patient gently onto his R side.  Noted skin to sacrum/coccyx region intact without redness or skin breakdown.  No exudate.    Recommend:  barrier paste to be applied daily after bath & prn each episode of incontinence.    Will sign-off for now; please re-consult as needed for any further wound care needs.           04/03/25 1020   WOCN Assessment   WOCN Total Time (mins) 30   Visit Date 04/03/25   Visit Time 1020   Consult Type New   WOCN Speciality Wound   Wound At risk for pressure Injury   Intervention assessed;chart review;coordination of care   Teaching on-going   Skin Interventions   Device Skin Pressure Protection absorbent pad utilized/changed   Pressure Reduction Devices alternating pressure pump (TANYA);pressure-redistributing mattress utilized   Pressure Reduction Techniques weight shift assistance provided   Skin Protection incontinence pads utilized   Positioning   Body Position turned;head facing, right   Head of Bed (HOB) Positioning HOB elevated   Positioning/Transfer Devices pillows;in use   Pressure Injury Prevention    Check Moisture Management Pad Done   Check Medical Devices Done   [REMOVED]      Wound 03/14/25 1015 Venous Ulcer Right lower;lateral Leg   Final Assessment Date/Final Assessment Time: 04/03/25 1020  Date First Assessed/Time First Assessed: 03/14/25 1015   Present on Original Admission: Yes  Primary Wound Type: Venous Ulcer  Side: Right  Orientation: lower;lateral  Location: Leg  Removal ...   Wound Image    [REMOVED]      Wound 03/14/25 1015 Venous Ulcer Left lower;anterior Leg   Final Assessment Date/Final Assessment Time: 04/03/25 1020  Date First Assessed/Time First Assessed: 03/14/25 1015   Present on Original Admission: Yes  Primary Wound Type: Venous Ulcer  Side: Left  Orientation: lower;anterior  Location: Leg  Removal ...   Wound  Image    [REMOVED]      Wound 25 1901 Pressure Injury Right Heel   Final Assessment Date/Final Assessment Time: 25 1020  Date First Assessed/Time First Assessed: 25 190   Present on Original Admission: Yes  Primary Wound Type: Pressure Injury  Side: Right  Location: Heel  Is this injury device related?: ...   Wound Image             2025         [1]   Social History  Socioeconomic History    Marital status: Legally     Number of children: 2   Occupational History    Occupation: retired     Employer: Retired   Tobacco Use    Smoking status: Former     Current packs/day: 0.00     Types: Cigarettes     Quit date: 2013     Years since quittin.8     Passive exposure: Past    Smokeless tobacco: Former     Quit date: 2013    Tobacco comments:     used marijuana since [id number], stopped after started dialysis   Substance and Sexual Activity    Alcohol use: No     Alcohol/week: 0.0 standard drinks of alcohol    Drug use: Not Currently     Comment:      Sexual activity: Never   Social History Narrative    . Lives with spouse. Has 2 children. Patient retired as  for [employer]. He has been washing cars.     Social Drivers of Health     Financial Resource Strain: Low Risk  (3/31/2025)    Overall Financial Resource Strain (CARDIA)     Difficulty of Paying Living Expenses: Not hard at all   Food Insecurity: No Food Insecurity (3/31/2025)    Hunger Vital Sign     Worried About Running Out of Food in the Last Year: Never true     Ran Out of Food in the Last Year: Never true   Transportation Needs: No Transportation Needs (3/31/2025)    PRAPARE - Transportation     Lack of Transportation (Medical): No     Lack of Transportation (Non-Medical): No   Physical Activity: Insufficiently Active (3/31/2025)    Exercise Vital Sign     Days of Exercise per Week: 2 days     Minutes of Exercise per Session: 30 min   Stress: No Stress Concern Present (3/31/2025)    [place] [employer] - Occupational Stress Questionnaire     Feeling of Stress : Not at all   Housing Stability: Low Risk  (3/31/2025)    Housing Stability Vital Sign     Unable to Pay for Housing in the Last Year: No     Number of Times Moved in the Last Year: 0     Homeless in the Last Year: No

## 2025-04-03 NOTE — CONSULTS
[facility] - Telemetry (Hospital)  Initial Discharge Assessment       Primary Care Provider: Valery Caal MD    Admission Diagnosis: Hypocalcemia [E83.51]  Hypokalemia [E87.6]  Screening for cardiovascular condition [Z13.6]  History of kidney transplant [Z94.0]  Cellulitis of left leg [L03.116]  Chest pain [R07.9]  Chronic renal impairment, unspecified CKD stage [N18.9]  Immunocompromised state due to drug therapy [D84.821, Z79.899]  Sepsis, due to unspecified organism, unspecified whether acute organ dysfunction present [A41.9]    Admission Date: 4/1/2025  Expected Discharge Date:     Transition of Care Barriers: Mobility    Payor: BLUE CROSS BLUE SHIELD / Plan: BCJOSE [place] OF [place] PPO / Product Type: PPO /     Extended Emergency Contact Information  Primary Emergency Contact: Edward Handley   [place]  Mobile Phone: [phone]  Relation: Daughter  Secondary Emergency Contact: Marybeth Handely  Address: [address] [place] [address] [id number] [place] [address]  Home Phone: [phone]  Relation: Daughter    Discharge Plan A: Home Health, Home with family         [facility]  [id number] [place] [address] [address]  [facility] [address]  Phone: [phone] Fax: [phone]      Initial Assessment (most recent)       Adult Discharge Assessment - 04/03/25 1217          Discharge Assessment    Assessment Type Discharge Planning Assessment     Confirmed/corrected address, phone number and insurance Yes     Confirmed Demographics Correct on Facesheet     Source of Information patient;family     Communicated GRETEL with patient/caregiver Date not available/Unable to determine     Reason For Admission Cellulitis of left lower extremity     People in Home spouse     Facility Arrived From: Home     Do you expect to return to your current living situation? Yes     Do you have help at home or someone to help you manage your care at home? Yes     Who are your caregiver(s) and their phone  number(s)? Spouse and daughters     Prior to hospitilization cognitive status: Alert/Oriented     Current cognitive status: Alert/Oriented     Walking or Climbing Stairs Difficulty yes     Walking or Climbing Stairs ambulation difficulty, requires equipment     Mobility Management walker, slide board     Equipment Currently Used at Home walker, rolling;hospital bed;slide board     Readmission within 30 days? No     Patient currently being followed by outpatient case management? No     Do you currently have service(s) that help you manage your care at home? Yes     Name and Contact number of agency [facility]     Is the pt/caregiver preference to resume services with current agency Yes     Do you take prescription medications? Yes     Do you have prescription coverage? Yes     Do you have any problems affording any of your prescribed medications? No     Is the patient taking medications as prescribed? yes     Who is going to help you get home at discharge? Pt may require ambulance     Are you on dialysis? No     Do you take coumadin? No     Discharge Plan A Home Health;Home with family     Discharge Plan discussed with: Patient;Adult children     Transition of Care Barriers Mobility                   HECTOR met with patient and daughter, Marybeth, at bedside. SW discussed consult for SNF. Family declining at this time and prefers to resume [facility] at discharge.   MD notified.  SW to follow for any additional needs.

## 2025-04-03 NOTE — ASSESSMENT & PLAN NOTE
The likely etiology of thrombocytopenia is infection. The patients 3 most recent labs are listed below.  Recent Labs     04/01/25  1804 04/02/25  0420 04/03/25  0954    147* 126*     Plan  - Will transfuse if platelet count is <10k.

## 2025-04-03 NOTE — ASSESSMENT & PLAN NOTE
Patient has Abnormal Magnesium: hypomagnesemia. Will continue to monitor electrolytes closely. Will replace the affected electrolytes and repeat labs to be done after interventions completed. The patient's magnesium results have been reviewed and are listed below.  Recent Labs   Lab 04/03/25  0954   MG 1.7

## 2025-04-03 NOTE — PLAN OF CARE
~ NAEON.     ~ AOx4. Able to verbalize needs & follow commands.      ~ POC reviewed with pt. Interventions implemented as appropriate.      ~ VS stable.      ~ ST on tele-monitor, box # [id number].    ~ On room air.      ~ LAC 20g PIV. Flushes w/out difficutly; saline locked. Dsg cdi. Integrity maintained.      ~ Receiving IV abx. No adverse reactions noted.    ~ Skin to BLE dusky & motted. Edema to LLE.    ~ SEE PREVIOUS NURSE'S NOTE.     ~ Incontinent of b/b. LBM 4/2.        ~ No c/o pain.      ~ Generalized weakness; moderately impaired. Wheel chair bound. Activity ad madhav.     ~ CBG ac/hs; no coverage required. Dexcom on KENNETH.    ~ SCDs refused; education provided.      ~ Able to reposition in bed independently. Frequent position changes encouraged. Educated pt on skin integrity & breakdown prevention, as well as s/sx of pressure injury;  verbalized understanding.      ~ NADN. Resting quietly in bed.     ~ Free of falls. Hourly rounding complete.     ~ All safety measures remain in place. SR up x2; bed low & locked. Bed alarm on & audible; educated on increased risk for falls. Call light w/in reach.     ~ Hourly monitoring continues throughout shift.    ~ Pt remains stable & w/in expected parameters w/ no further changes noted at this time. Will continue to observe & report any changes to appropriate provider.    ~ Chart check complete.

## 2025-04-03 NOTE — SUBJECTIVE & OBJECTIVE
Review of Systems   All other systems reviewed and are negative.    Objective:     Vital Signs (Most Recent):  Temp: 98.5 °F (36.9 °C) (04/03/25 1541)  Pulse: 102 (04/03/25 1541)  Resp: 19 (04/03/25 1541)  BP: (!) 150/69 (04/03/25 1541)  SpO2: 96 % (04/03/25 1541) Vital Signs (24h Range):  Temp:  [98.5 °F (36.9 °C)-99.8 °F (37.7 °C)] 98.5 °F (36.9 °C)  Pulse:  [] 102  Resp:  [17-19] 19  SpO2:  [94 %-98 %] 96 %  BP: (112-158)/(60-69) 150/69     Weight: 103.9 kg (229 lb 0.9 oz)  Body mass index is 31.07 kg/m².  No intake or output data in the 24 hours ending 04/03/25 1744      Physical Exam  Constitutional:       General: He is in acute distress.      Appearance: Normal appearance. He is obese. He is ill-appearing.   Cardiovascular:      Rate and Rhythm: Normal rate and regular rhythm.      Heart sounds: No murmur heard.  Pulmonary:      Effort: Pulmonary effort is normal. No respiratory distress.      Breath sounds: Normal breath sounds. No wheezing.   Abdominal:      General: There is no distension.      Palpations: Abdomen is soft.      Tenderness: There is no abdominal tenderness.   Musculoskeletal:      Right lower leg: Edema present.      Left lower leg: Edema present.   Skin:     Findings: Erythema present.   Neurological:      Mental Status: He is alert.               Significant Labs: All pertinent labs within the past 24 hours have been reviewed.    FL Upper GI   Final Result      No significant stenosis or narrowing of the esophagus demonstrate.  Lateral esophagram was unable to be obtained secondary to patient mobility.      Tertiary contractions.      GERD.         Electronically signed by: Salty Moreno   Date:    04/03/2025   Time:    15:48      X-Ray Chest AP Portable   Final Result   No acute cardiopulmonary disease.      Finalized on: 4/1/2025 7:26 PM By:  Raudel Mathew   [facility]# [id number]      2025-04-01 19:28:36.851     [facility]          Recent Lab Results  (Last 5 results in the past 24 hours)         04/03/25  1625   04/03/25  1318   04/03/25  0954   04/03/25  0644   04/02/25  2308        Anion Gap     12           Aniso     Slight           Bands     2.0           Basophil %     2.0           BUN     35           Calcium     8.0           Chloride     102           CO2     21           Creatinine     1.9           eGFR     37  Comment: Estimated GFR calculated using the CKD-EPI creatinine (2021) equation.           Eos %     3.0           Glucose     186           Gran # (ANC)     1.5           Hematocrit     27.7           Hemoglobin     7.9           Lymph %     24.0           Magnesium      1.7           MCH     24.2           MCHC     28.5           MCV     85           Mono %     13.0           MPV     11.3           nRBC     0           Ovalocytes     Occasional           Platelet Estimate     Decreased           Platelet Count     126           POCT Glucose 222   191     197   190       Poikilocytosis     Slight           Potassium     3.3           RBC     3.27           RDW     17.9           Segmented Neutrophil %     56.0           Sodium     135           Teardrop Cells     Occasional           WBC     2.60                                  Significant Imaging: I have reviewed all pertinent imaging results/findings within the past 24 hours.    FL Upper GI   Final Result      No significant stenosis or narrowing of the esophagus demonstrate.  Lateral esophagram was unable to be obtained secondary to patient mobility.      Tertiary contractions.      GERD.         Electronically signed by: Salty Moreno   Date:    04/03/2025   Time:    15:48      X-Ray Chest AP Portable   Final Result   No acute cardiopulmonary disease.      Finalized on: 4/1/2025 7:26 PM By:  Raudel Mathew   [facility]# [id number]      2025-04-01 19:28:36.851     [facility]

## 2025-04-03 NOTE — ASSESSMENT & PLAN NOTE
Chronic, uncontrolled.  Latest blood pressure and vitals reviewed-   Temp:  [98.9 °F (37.2 °C)-100.9 °F (38.3 °C)]   Pulse:  []   Resp:  [17-19]   BP: (105-158)/(56-69)   SpO2:  [95 %-98 %] .   Home meds for hypertension were reviewed and noted below.   Hypertension Medications              furosemide (LASIX) 40 MG tablet Take 1 tablet (40 mg total) by mouth once daily.    metoprolol succinate (TOPROL-XL) 25 MG 24 hr tablet Take 1 tablet (25 mg total) by mouth once daily.    sacubitriL-valsartan (ENTRESTO)  mg per tablet ([Paused] since 3/11/2025 12:59 PM) Take 1 tablet by mouth 2 (two) times daily.     While in the hospital, will manage blood pressure as follows; Continue home antihypertensive regimen    Will utilize p.r.n. blood pressure medication only if patient's blood pressure greater than  180/110 and he develops symptoms such as worsening chest pain or shortness of breath.

## 2025-04-03 NOTE — ASSESSMENT & PLAN NOTE
Patient's FSGs are uncontrolled due to hypoglycemia on current medication regimen.  Last A1c reviewed-   Lab Results   Component Value Date    HGBA1C 5.7 (H) 12/17/2024     Most recent fingerstick glucose reviewed-   Recent Labs   Lab 04/02/25  2308 04/03/25  0644 04/03/25  1318 04/03/25  1625   POCTGLUCOSE 190* 197* 191* 222*     Current correctional scale  Low  Titrate as needed anti-hyperglycemic dose as follows-   Antihyperglycemics (From admission, onward)      Start     Stop Route Frequency Ordered    04/03/25 1245  insulin glargine U-100 (Lantus) pen 10 Units         -- SubQ 2 times daily 04/03/25 1136    04/01/25 2134  insulin aspart U-100 pen 0-5 Units         -- SubQ Before meals & nightly PRN 04/01/25 2035          Plan:  -SSI  -A1c  -Accu-checks  -Hold oral hypoglycemics while patient is in the hospital  -Continue home long-acting insulin at 20% decrease, titrate up as needed  -Hypoglycemic protocol

## 2025-04-03 NOTE — PT/OT/SLP EVAL
Speech Language Pathology Evaluation  Bedside Swallow    Patient Name:  Mitch Whittaker   MRN:  [id number]  Admitting Diagnosis: Cellulitis of left lower extremity    Recommendations:                 General Recommendations:   Upper GI Fl.; OP GI evaluation  Diet recommendations:  Minced & Moist Diet - IDDSI Level 5, Thin liquids - IDDSI Level 0   Aspiration Precautions:  behavioral reflux precautions, Assistance with meals, Feed only when awake/alert, Frequent oral care, HOB to 90 degrees, Monitor for s/s of aspiration, Small bites/sips, and Standard aspiration precautions   General Precautions: Standard, other (see comments), aspiration (GERD/behavioral reflux)  Communication strategies:  provide increased time to answer    Assessment:     Mitch Whittaker is a 69 y.o. male admitted to [facility] with dx cellulitis of Left LE, sepsis, hypokalemia and hypomagnesemia.  He presents with adequate REBEKA and ability to communicate acute needs, although cognitive deficits noted.  Pt reported worsening dysphagia symptoms with solids and pill-form medications since 2/2025, including globus sensation and early satiety, which is impacting his nutritional intake. ST consulted overnight as pt reported increased difficulty swallowing solids and saliva, with oral excretion of secretions into cup throughout the day.  Improved secretion management this a.m. with functional vocal quality and respiratory status (RA).  No overt s/s of aspiration present during bedside CSE; however, persistent c/o globus sensation>regular solids, requiring liquid wash/rinse to assist in transit and frequent belching noted throughout assessment.  He is recommended for IDDSI 5-minced/moist solids and IDDSI 0-thin liquids, as well as comprehensive swallow evaluation of esophagus, including Upper GI Fl or Esophagram.  OP GI also warranted given pt ongoing dysphagia complaints impacting nutritional intake. ST will f/u diet.    EXAMINATION:  Esophagram.      CLINICAL HISTORY:  esophageal/GI dysfunction;     TECHNIQUE:  Contrast material: Barium and effervescent granules     Air kerma: 36 mGy     COMPARISON:  None     FINDINGS:  Limited study.     Preliminary radiograph: Unremarkable     Swallowing: No aspiration.     Esophagus: Tertiary contractions throughout the exam.  Mild reflux from distal to proximal esophagus.  Questionable upper esophageal narrowing difficult to more definitively evaluate secondary to patient's limited mobility.  No hiatal hernia demonstrated.     Impression:     No significant stenosis or narrowing of the esophagus demonstrate.  Lateral esophagram was unable to be obtained secondary to patient mobility.     Tertiary contractions.     GERD.        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 RNA NEGATIVE 5/2015, 2021, 2022    History of colon polyps     HPTH (hyperparathyroidism)     Hyperlipidemia     Hypertension associated with stage 3 chronic kidney disease due to type 2 diabetes mellitus     LBBB (left bundle branch block) 12/20/2021    Morbid  "obesity with BMI of 45.0-49.9, adult     Nephrolithiasis 6/7/2013    PCO (posterior capsular opacification), left 03/04/2019    Proteinuria     resolved s/p transplant    S/P kidney transplant     Sleep apnea     Type 2 diabetes, uncontrolled, with retinopathy     Type II diabetes mellitus with renal manifestations        Past Surgical History:   Procedure Laterality Date    CARDIAC CATHETERIZATION  01/01/2008    normal coronary    CARPAL TUNNEL RELEASE Right 12/01/2023    Procedure: RELEASE, CARPAL TUNNEL;  Surgeon: Noel Almonte MD;  Location: [facility] OR;  Service: Orthopedics;  Laterality: Right;    CARPAL TUNNEL RELEASE Left 7/18/2024    Procedure: RELEASE, CARPAL TUNNEL;  Surgeon: Noel Almonte MD;  Location: [facility] OR;  Service: Orthopedics;  Laterality: Left;    COLONOSCOPY N/A 04/05/2018    Procedure: COLONOSCOPY;  Surgeon: Chava Ronquillo MD;  Location: [facility] ENDO;  Service: Endoscopy;  Laterality: N/A;    COLONOSCOPY N/A 05/02/2022    Procedure: COLONOSCOPY;  Surgeon: Alix Puente MD;  Location: [facility] ENDO;  Service: Endoscopy;  Laterality: N/A;    COLONOSCOPY N/A 06/07/2023    Procedure: COLONOSCOPY - rule out CMV  Cardiac clearance/Eliquis hold approval received on 05/21/23 per Dr. Meade, cardiology.  Note in encounters.  LB;  Surgeon: Daniella Shah MD;  Location: [facility];  Service: Endoscopy;  Laterality: N/A;    ESOPHAGOGASTRODUODENOSCOPY N/A 03/26/2024    Procedure: EGD (ESOPHAGOGASTRODUODENOSCOPY) 3/1-pt cleared, ok to hold eliquis for 3 days;  Surgeon: Daniella Shah MD;  Location: [facility];  Service: Endoscopy;  Laterality: N/A;    KIDNEY TRANSPLANT  2015    RETINAL LASER PROCEDURE         Social History: Per PT evaluation: "Prior to January 2025 pt was living at home with wife in [place] with no steps at entry, independent with ADLs, ambulatory in home and community, pt was also working and driving. Since hospital admission, pt has been in/out of hospital and " "admitted to [facility] x 1 month where he was total care with use of lina lift and standing frame (per documentation). Prior to this admission pt was at home receiving HHPT services. Daughter reported that pt was sitting EOB and transferring with sliding board."      Prior diet: Pt reported decreased po intake at home s/t globus sensation, dysphagia>solids and early satiety.      Prior Intubation HX:  N/A this admission    Modified Barium Swallow: N/A    Exam: XR CHEST AP PORTABLE     Comparison: 03/13/2025     Clinical Indication:  Sepsis     Findings: Heart size and pulmonary vasculature are unremarkable.  No focal consolidation, pleural effusions or pneumothorax.     No acute angulated or displaced fractures. Stent in the subclavian artery on the right.        Impression:  No acute cardiopulmonary disease.     Finalized on: 4/1/2025 7:26 PM By:  Raudel Mathew  [facility]# [id number]      2025-04-01 19:28:36.851     [facility]    Subjective     Pt seen bedside for ST swallowing evaluation. No c/o pain. Awake/alert and communicative. No family present.  Patient goals: improve mobility, strength, swallowing/po intake     Pain/Comfort:  Pain Rating 1: 0/10  Pain Rating Post-Intervention 1: 0/10  Pain Rating 2: 0/10  Pain Rating Post-Intervention 2: 0/10    Respiratory Status: Room air    Objective:     Oral Musculature Evaluation  Oral Musculature: general weakness  Dentition: scattered dentition  Secretion Management: adequate  Mucosal Quality: good  Mandibular Strength and Mobility: impaired  Oral Labial Strength and Mobility: WFL  Lingual Strength and Mobility: impaired strength  Velar Elevation: WFL  Buccal Strength and Mobility: WFL  Volitional Cough: present  Volitional Swallow: present  Voice Prior to PO Intake: WFL    Bedside Swallow Eval:   Oketo Swallow Protocol:  Oketo Swallow Protocol dictates patient remain NPO if fail screener (Radr et al. 2014).  The Oketo Swallow Protocol was administered. The patient was alert " and provided the instructions prior to the beginning of the protocol. The patient consumed 3 oz before putting the cup down. Patient drank with consecutive swallows. No overt s/s of aspiration observed during today's assessment; however, he reported ongoing globus sensation.     Clinical Swallow Examination:   Of note, patient fed by SLP throughout evaluation. Patient presented with:     CONSISTENCY  NOTES   THIN (IDDSI 0) 3 oz water challenge via straw   Multiple trials of large bolus consumption/3 oz water test  Belching present and frequent post-deglutition  No overt s/s of aspiration  C/o globus sensation   PUREE (IDDSI 4/Extremely Thick)   TSP/TBSP bites of pudding/applesauce No overt s/s of aspiration. Denied GI complaints.   SOLID (IDDSI 7/Regular) Bite of Pavithra Donel cookie    Reported globus sensation, requesting water immediately after the swallow.  C/O worsening dysphagia with solids since 2/2025. Delayed coughing post-deglutition.     Thickened liquids were not used in this assessment. Thuanfe (2018) reported that thickened liquids have no sound evidence at reducing the risk of pneumonia in patients with dysphagia and can cause harm by increasing their risk of dehydration. It also presents an increased risk of UTI, electrolyte imbalance, constipation, fecal impaction, cognitive impairment, functional decline and even death (Langmore, 2002; [place], 2016).  Thickened liquids are associated with risks including dehydration, increased pharyngeal residue, potential interference with medication absorption, and decreased quality of life (Dylan, 2013). Thickened liquids are also more likely to be silently aspirated than thin liquids (Misha et al., 2018). This supports the assertion that we should confirm a patient requires thickened liquids with an instrumental swallow study prior to recommending them.    References:   Dylan BASURTO (2013). Thickening agents used for dysphagia management: Effect on  bioavailability of water, medication and feelings of satiety. Nutrition Journal, 12, 54. https://doi.org/10.1186/3397-3192-01-54    SB Cabral, VANITA Cheney, JESSY Middleton, & SB Piña (2018). Cough response to aspiration in thin and thick fluids during FEES in hospitalized inpatients. International journal of language & communication disorders, 53(5), 909-918. https://doi.org/10.1111/0669-6382.34236    INTERPRETATION AND RISK ASSESSMENT:  Clinical swallow evaluation (CSE) revealed oral phase characterized by lingual, labial, and buccal strength and range of motion functional for lip closure, bolus preparation and propulsion. The patient had no anterior loss of the bolus with complete closure of the lips around the utensils. No residue remained in the oral cavity following the swallow. Patient without overt clinical signs/symptoms of aspiration on any PO trials given; however, he presents with a possibly inefficient swallow as indicated by weakness and GI symptoms. Patient reported globus sensation, greatest with solids and early satiety prior to admission.    Clinical signs of pharyngeal-esophageal dysphagia, likely chronic and reportedly worsening over the past few months, impacting his nutritional intake. Esophageal imaging is recommended.     Goals:   Multidisciplinary Problems       SLP Goals          Problem: SLP    Goal Priority Disciplines Outcome   SLP Goal     SLP    Description: 1.  Pt will consume the least restrictive PO diet without overt s/s of aspiration.                       Plan:     Patient to be seen:  1 x/week, 2 x/week   Plan of Care expires:  04/10/25  Plan of Care reviewed with:  patient   SLP Follow-Up:  Yes       Discharge recommendations:  Moderate Intensity Therapy   Barriers to Discharge:  None    Time Tracking:     SLP Treatment Date:   04/03/25  Speech Start Time:  0900  Speech Stop Time:  0930     Speech Total Time (min):  30 min    Billable Minutes: Eval Swallow and Oral Function 15  minutes and Self Care/Home Management Training 15 minutes    04/03/2025

## 2025-04-03 NOTE — ASSESSMENT & PLAN NOTE
Patient's FSGs are uncontrolled due to hypoglycemia on current medication regimen.  Last A1c reviewed-   Lab Results   Component Value Date    HGBA1C 5.7 (H) 12/17/2024     Most recent fingerstick glucose reviewed-   Recent Labs   Lab 04/02/25  1211 04/02/25  1803 04/02/25  2308 04/03/25  0644   POCTGLUCOSE 191* 183* 190* 197*     Current correctional scale  Low  Titrate as needed anti-hyperglycemic dose as follows-   Antihyperglycemics (From admission, onward)      Start     Stop Route Frequency Ordered    04/01/25 2134  insulin aspart U-100 pen 0-5 Units         -- SubQ Before meals & nightly PRN 04/01/25 2035          Plan:  -SSI  -A1c  -Accu-checks  -Hold oral hypoglycemics while patient is in the hospital  -Continue home long-acting insulin at 20% decrease, titrate up as needed  -Hypoglycemic protocol

## 2025-04-03 NOTE — ASSESSMENT & PLAN NOTE
[facility]  Plan:  -continue home medication  -monitor H/H

## 2025-04-03 NOTE — ASSESSMENT & PLAN NOTE
"Patient has Combined Systolic and Diastolic heart failure that is Chronic. On presentation their CHF was well compensated. Most recent BNP and echo results are listed below.  No results for input(s): "BNP" in the last 72 hours.  Latest ECHO  Results for orders placed during the hospital encounter of 03/13/25    Echo Saline Bubble? No    Interpretation Summary    Left Ventricle: The left ventricle is normal in size. Mildly increased ventricular mass. Mildly increased wall thickness. There is mild concentric hypertrophy. Normal wall motion. Septal motion is consistent with bundle branch block. There is moderately reduced systolic function with a visually estimated ejection fraction of 35 - 40%. Grade I diastolic dysfunction.    Right Ventricle: The right ventricle is normal in size. Wall thickness is normal. Systolic function is normal.    Left Atrium: Mildly dilated    Aorta: Aortic annulus is mildly dilated measuring 4.01 cm. Ascending aorta is normal measuring 3.69 cm.    Pulmonary Artery: The estimated pulmonary artery systolic pressure is 24 mmHg.    IVC/SVC: Normal venous pressure at 3 mmHg.    Current Heart Failure Medications  metoprolol succinate (TOPROL-XL) 24 hr tablet 25 mg, Daily, Oral  furosemide tablet 40 mg, Daily, Oral    Plan  -Monitor strict I&Os and daily weights.    -Place on telemetry  -Low sodium diet  -Place on fluid restriction of 1.5 L.   -Cardiology has not been consulted  -The patient's volume status is at their baseline  -Continue home medications    "

## 2025-04-03 NOTE — ASSESSMENT & PLAN NOTE
Body mass index is 31.07 kg/m². Morbid obesity complicates all aspects of disease management from diagnostic modalities to treatment. Weight loss encouraged and health benefits explained to patient.

## 2025-04-03 NOTE — ASSESSMENT & PLAN NOTE
Anemia is likely due to chronic disease due to Chronic Kidney Disease. Most recent hemoglobin and hematocrit are listed below.  Recent Labs     04/01/25  1804 04/02/25  0420 04/03/25  0954   HGB 9.6* 9.1* 7.9*   HCT 31.8* 31.4* 27.7*     Plan  -Monitor serial CBC: Daily  -Transfuse PRBC if patient becomes hemodynamically unstable, symptomatic or H/H drops below 7/21.  -Patient has not received any PRBC transfusions to date  -Patient's anemia is currently stable

## 2025-04-03 NOTE — NURSING
During morning med pass, pt asked me to pass him a cup to spit in, which appeared to have been spit in multiple times already. He then informed me that he is having difficulty swallowing his saliva, as well as moving his head. He said he had difficulty swallowing his food yesterday. Night prior he was not c/o any of this. Elevated HOB & set up wall suction for pt to spit in. Informed GENET Cao MD immediately. Pt made NPO and ST grullon ordered.

## 2025-04-03 NOTE — HOSPITAL COURSE
4/2/25  Patient admitted or LLE cellulitis, continue cefepime  Noted bilateral chronic venous stasis, Wound Care consulted  Reports BLE weakness x 2 months, difficulty ambulating  Replete K and Mg  PT/OT consult    4/3/25  Patient with dysphagia and early satiety, ST consulted  Hx of Tacrolimus induced GI toxicity  Upper GI FL pending  Patient with renal transplant, on tacrolimus and cellcept  Restart tacrolimus, consult Nephrology for assitance in transplant management  Continue cefepime for LLE celulitis  PT/OT with reccs for JOSE, consult SW for SNF placement    4/4/25  NAEON, patient resting in bed, no complaints  Blood cultures NGTD, check procal, possibly de-escalate cefepime 1-2 days  On IDDSI level 5 diet per  reccs, appreciate assistance, will plan for outpatient GI referral  Stop famotidine, start Protonix, plan to continue on discharge    4/5/25  NAEON, AM labs pending  LLE cellulitis improving  Cr 1.8, Nephrology following, on Tacrolimus  Family at bedside, updated    4/6/25  NAEON, Cr 1.8 --> 2.3, tacrolimus level pending  Continue Keflex for cellulitis   Patient reports fatigue, generalized weakness  Consult PT/OT, may benefit from SNF/rehab    04/07/2025   -patient is seen and examined with wife and RN at bedside.  Per wife, patient has been more confused over the weekend, reports pain all over and refusing some care, stating he feels like he is going to die.  On evaluation, he is oriented to self and place but not situation.  Refusing most of exam.  Question delirium secondary to hospitalization versus due to recent use of cefepime.    04/08/2025  No acute events overnight.  Patient with sinus tachycardia on tele monitor, T-max 100.4°.  Patient is seen and examined with nurse tech at bedside, no family present during morning rounds.  Patient awake but does not answer questions, when extremities are palpated he states please don't do that ma'a.m.  Moving all extremities spontaneously but does not  follow commands.  We will obtain MRI brain further evaluate altered mental status.    04/09  MRI Brain with no acute findings. Per case management, family declined SNF placement for patient.   Pt mental status waxing/waning. He is slightly more conversant this morning, oriented x 1. Reports he did not sleep well overnight, reports there were demons in his room but that he feels better this AM.     04/10/2025  Pt seen and examined with therapists at bedside, he is much more alert and coherent today. Oriented to self, place, year, answers questions appropriately. Reports some L leg pain. Denies CP, SOB. Discussed with nephrology who recommend continuation of IVF as pt po intake is poor and Cr downtrending with hydration. Completed 10d course of abx, Keflex discontinued.     04/11/2025  No acute events overnight.  Patient is seen and examined with spouse at bedside.  Patient is much more alert this morning, oriented x3.  Main complaint is pain to bilateral legs.  Denies chest pain, shortness of breath.  Creatinine improved to 1.9, IV fluids discontinued.  Potassium/phosphorus repleted.  Tacrolimus level returned at 5.6--discussed with nephrology team Dr. Magallanes-we will continue Prograf at current dose of 3 mg b.i.d. until patient can follow up with Nephrology and have repeat labs done, resume mycophenolate on discharge.  Patient is seen and examined on day of discharge and appears stable for discharge with home health per my exam.  Follow up with PCP within 3-5 days ([facility] NP at home referral), followup with nephrology within 1-2 weeks

## 2025-04-03 NOTE — SUBJECTIVE & OBJECTIVE
Review of Systems   All other systems reviewed and are negative.    Objective:     Vital Signs (Most Recent):  Temp: 99.4 °F (37.4 °C) (04/03/25 0808)  Pulse: (!) 111 (04/03/25 0808)  Resp: 18 (04/03/25 0840)  BP: 133/65 (04/03/25 0808)  SpO2: 95 % (04/03/25 0808) Vital Signs (24h Range):  Temp:  [98.9 °F (37.2 °C)-100.9 °F (38.3 °C)] 99.4 °F (37.4 °C)  Pulse:  [] 111  Resp:  [17-19] 18  SpO2:  [95 %-98 %] 95 %  BP: (105-158)/(56-69) 133/65     Weight: 103.9 kg (229 lb 0.9 oz)  Body mass index is 31.07 kg/m².  No intake or output data in the 24 hours ending 04/03/25 0942      Physical Exam  Constitutional:       General: He is not in acute distress.     Appearance: Normal appearance. He is obese. He is ill-appearing.   Cardiovascular:      Rate and Rhythm: Normal rate and regular rhythm.      Heart sounds: No murmur heard.  Pulmonary:      Effort: Pulmonary effort is normal. No respiratory distress.      Breath sounds: Normal breath sounds. No wheezing.   Abdominal:      General: There is no distension.      Palpations: Abdomen is soft.      Tenderness: There is no abdominal tenderness.   Musculoskeletal:      Right lower leg: Edema present.      Left lower leg: Edema present.   Skin:     Findings: Erythema present.   Neurological:      Mental Status: He is alert.               Significant Labs: All pertinent labs within the past 24 hours have been reviewed.    X-Ray Chest AP Portable   Final Result   No acute cardiopulmonary disease.      Finalized on: 4/1/2025 7:26 PM By:  Raudel Mathew   [facility]# [id number]      2025-04-01 19:28:36.851     [facility]          Recent Lab Results         04/03/25  0644   04/02/25  2308   04/02/25  1803   04/02/25  1211        POCT Glucose 197   190   183   191               Significant Imaging: I have reviewed all pertinent imaging results/findings within the past 24 hours.    X-Ray Chest AP Portable   Final Result   No acute cardiopulmonary disease.      Finalized on: 4/1/2025 7:26  PM By:  Raudel Mathew   [facility]# [id number]      2025-04-01 19:28:36.851     [facility]

## 2025-04-03 NOTE — PROGRESS NOTES
[facility] Medicine  Progress Note    Patient Name: Mitch Whittaker  MRN: [id number]  Patient Class: IP- Inpatient   Admission Date: 4/1/2025  Length of Stay: 2 days  Attending Physician: Carlos Mathew MD  Primary Care Provider: Valery Caal MD        Subjective     Principal Problem:Cellulitis of left lower extremity        HPI:  Mitch Whittaker is a 69 y.o. 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), Diabetic retinopathy, DM (diabetes mellitus), type 2 with complications (1994), Edema, End stage kidney disease, Gallbladder polyp, Heart failure, diastolic, due to HTN, Hemodialysis status, 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 who presented to the ED for further evaluation of worsening left leg pain and swelling which began last night.  Patient reports being bed-bound but suffers from chronic venous stasis and recurrent skin infections.  Patient reported symptoms began acutely with no known alleviating or aggravating factors noted with a associated symptoms including nonproductive cough in addition to those noted above.  He denied  endorsing any recent trauma to the affected area and reported being in his usual state of health prior to onset of symptoms.  All other review of systems negative except as noted above.  Initial workup in the ED revealed patient met SIRS criteria for sepsis with concerns for underlying cellulitis and was initiated on cefepime.  Remaining laboratory workup revealed H/H 9.6/31.8, potassium 2.8, creatinine/GFR 1.9/37, blood glucose 61, magnesium 1.1, troponin 0.152, lactic acid within normal limits, procalcitonin 1.60, chest x-ray negative for acute findings.  Patient admitted to Hospital Medicine inpatient for continued medical management.    PCP: Valery Caal      Overview/Hospital Course:    4/2/25  Patient admitted or LLE cellulitis, continue cefepime  Noted bilateral chronic venous stasis, Wound Care consulted  Reports BLE weakness x 2 months, difficulty ambulating  Replete K and Mg  PT/OT consult      Review of Systems   All other systems reviewed and are negative.    Objective:     Vital Signs (Most Recent):  Temp: 99.4 °F (37.4 °C) (04/03/25 0808)  Pulse: (!) 111 (04/03/25 0808)  Resp: 18 (04/03/25 0840)  BP: 133/65 (04/03/25 0808)  SpO2: 95 % (04/03/25 0808) Vital Signs (24h Range):  Temp:  [98.9 °F (37.2 °C)-100.9 °F (38.3 °C)] 99.4 °F (37.4 °C)  Pulse:  [] 111  Resp:  [17-19] 18  SpO2:  [95 %-98 %] 95 %  BP: (105-158)/(56-69) 133/65     Weight: 103.9 kg (229 lb 0.9 oz)  Body mass index is 31.07 kg/m².  No intake or output data in the 24 hours ending 04/03/25 0942      Physical Exam  Constitutional:       General: He is not in acute distress.     Appearance: Normal appearance. He is obese. He is ill-appearing.   Cardiovascular:      Rate and Rhythm: Normal rate and regular rhythm.      Heart sounds: No murmur heard.  Pulmonary:      Effort: Pulmonary effort is normal. No respiratory distress.      Breath sounds: Normal breath sounds. No wheezing.   Abdominal:      General: There is no distension.       Palpations: Abdomen is soft.      Tenderness: There is no abdominal tenderness.   Musculoskeletal:      Right lower leg: Edema present.      Left lower leg: Edema present.   Skin:     Findings: Erythema present.   Neurological:      Mental Status: He is alert.               Significant Labs: All pertinent labs within the past 24 hours have been reviewed.    X-Ray Chest AP Portable   Final Result   No acute cardiopulmonary disease.      Finalized on: 4/1/2025 7:26 PM By:  Raudel Mathew   [facility]# [id number]      2025-04-01 19:28:36.851     [facility]          Recent Lab Results         04/03/25  0644   04/02/25  2308   04/02/25  1803   04/02/25  1211        POCT Glucose 197   190   183   191               Significant Imaging: I have reviewed all pertinent imaging results/findings within the past 24 hours.    X-Ray Chest AP Portable   Final Result   No acute cardiopulmonary disease.      Finalized on: 4/1/2025 7:26 PM By:  Raudel Mathew   [facility]# [id number]      2025-04-01 19:28:36.851     [facility]              Assessment & Plan  Cellulitis of left lower extremity      Chronic venous insufficiency of lower extremity      Sepsis  This patient does have evidence of infective focus  My overall impression is sepsis.  Source: Skin and Soft Tissue (location left leg cellulitis, gout of left knee)  Antibiotics given-   Antibiotics (72h ago, onward)      Start     Stop Route Frequency Ordered    04/02/25 0600  ceFEPIme injection 2 g         -- IV Every 12 hours (non-standard times) 04/01/25 2036          Latest lactate reviewed-  Recent Labs   Lab 04/01/25  2212   LACTATE 0.7     Organ dysfunction indicated by Acute kidney injury    Fluid challenge Ideal Body Weight- The patient's ideal body weight is Ideal body weight: 77.6 kg (171 lb 1.2 oz) which will be used to calculate fluid bolus of 30 ml/kg for treatment of septic shock.      Post- resuscitation assessment No - Post resuscitation assessment not needed     Will Not start Pressors-  "Levophed for MAP of 65    Source control achieved by: Cefepime    Follow up uric acid level and consider ortho consult for left knee aspiration to fully rule out gout flare.    Weakness of both lower extremities  Patient reported worsening bilateral lower extremity weakness times 2-3 months in which he reported symptoms began acutely after his right leg gave out going to the restroom.  Patient denies endorsing any back pain, experiencing ground level fall/trauma, but does report fecal incontinence when urinating.  Patient has since been bed-bound in his not been seen/evaluated by a physician for these symptoms.  Plan:  -replete electrolytes  -consider obtaining spine imaging for further evaluation  -PT/OT    Hypokalemia  Patient's most recent potassium results are listed below.   Recent Labs     04/01/25  1804 04/01/25  2334 04/02/25  0420   K 2.8* 3.3* 3.3*     Plan  -Replete potassium per protocol  -Monitor potassium Daily  -Patient's hypokalemia is  currently undergoing medical management    Hypomagnesemia  Patient has Abnormal Magnesium: hypomagnesemia. Will continue to monitor electrolytes closely. Will replace the affected electrolytes and repeat labs to be done after interventions completed. The patient's magnesium results have been reviewed and are listed below.  No results for input(s): "MG" in the last 24 hours.    Chronic combined systolic and diastolic congestive heart failure  Patient has Combined Systolic and Diastolic heart failure that is Chronic. On presentation their CHF was well compensated. Most recent BNP and echo results are listed below.  No results for input(s): "BNP" in the last 72 hours.  Latest ECHO  Results for orders placed during the hospital encounter of 03/13/25    Echo Saline Bubble? No    Interpretation Summary    Left Ventricle: The left ventricle is normal in size. Mildly increased ventricular mass. Mildly increased wall thickness. There is mild concentric hypertrophy. Normal wall " motion. Septal motion is consistent with bundle branch block. There is moderately reduced systolic function with a visually estimated ejection fraction of 35 - 40%. Grade I diastolic dysfunction.    Right Ventricle: The right ventricle is normal in size. Wall thickness is normal. Systolic function is normal.    Left Atrium: Mildly dilated    Aorta: Aortic annulus is mildly dilated measuring 4.01 cm. Ascending aorta is normal measuring 3.69 cm.    Pulmonary Artery: The estimated pulmonary artery systolic pressure is 24 mmHg.    IVC/SVC: Normal venous pressure at 3 mmHg.    Current Heart Failure Medications       Plan  -Monitor strict I&Os and daily weights.    -Place on telemetry  -Low sodium diet  -Place on fluid restriction of 1.5 L.   -Cardiology has not been consulted  -The patient's volume status is at their baseline  -Continue home medications    Hypertension  Chronic, uncontrolled.  Latest blood pressure and vitals reviewed-   Temp:  [98.9 °F (37.2 °C)-100.9 °F (38.3 °C)]   Pulse:  []   Resp:  [17-19]   BP: (105-158)/(56-69)   SpO2:  [95 %-98 %] .   Home meds for hypertension were reviewed and noted below.   Hypertension Medications              furosemide (LASIX) 40 MG tablet Take 1 tablet (40 mg total) by mouth once daily.    metoprolol succinate (TOPROL-XL) 25 MG 24 hr tablet Take 1 tablet (25 mg total) by mouth once daily.    sacubitriL-valsartan (ENTRESTO)  mg per tablet ([Paused] since 3/11/2025 12:59 PM) Take 1 tablet by mouth 2 (two) times daily.     While in the hospital, will manage blood pressure as follows; Continue home antihypertensive regimen    Will utilize p.r.n. blood pressure medication only if patient's blood pressure greater than  180/110 and he develops symptoms such as worsening chest pain or shortness of breath.    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  Last A1c reviewed-   Lab Results   Component Value Date    HGBA1C 5.7 (H) 12/17/2024     Most recent fingerstick glucose reviewed-   Recent Labs   Lab 04/02/25  1211 04/02/25  1803 04/02/25  2308 04/03/25  0644   POCTGLUCOSE 191* 183* 190* 197*     Current correctional scale  Low  Titrate as needed  anti-hyperglycemic dose as follows-   Antihyperglycemics (From admission, onward)      Start     Stop Route Frequency Ordered    04/01/25 2134  insulin aspart U-100 pen 0-5 Units         -- SubQ Before meals & nightly PRN 04/01/25 2035          Plan:  -SSI  -A1c  -Accu-checks  -Hold oral hypoglycemics while patient is in the hospital  -Continue home long-acting insulin at 20% decrease, titrate up as needed  -Hypoglycemic protocol      Anemia, chronic disease  Anemia is likely due to chronic disease due to Chronic Kidney Disease. Most recent hemoglobin and hematocrit are listed below.  Recent Labs     04/01/25  1804 04/02/25  0420   HGB 9.6* 9.1*   HCT 31.8* 31.4*     Plan  -Monitor serial CBC: Daily  -Transfuse PRBC if patient becomes hemodynamically unstable, symptomatic or H/H drops below 7/21.  -Patient has not received any PRBC transfusions to date  -Patient's anemia is currently stable    Coronary artery disease of native artery of native heart with stable angina pectoris  Patient with known CAD, which is controlled.  EKG negative for signs of acute ischemia.  Troponin elevated at 0.15 to likely secondary to demand ischemia in setting of sepsis and CHF. Will continue  home medications  and monitor for S/Sx of angina/ACS. Continue to monitor on telemetry.    Chronic immunosuppression with Prograf, MMF and prednisone  Patient currently on Prograf, CellCept, and mycophenolate.  Plan:  -continue Prograf and CellCept  -holding mycophenolate    Hyperlipidemia associated with type 2 diabetes mellitus  Patient is chronically on statin.will continue for now. Last Lipid Panel:    Lab Results   Component Value Date    CHOL 175 12/17/2024    HDL 50 12/17/2024    LDLCALC 98.2 12/17/2024    TRIG 134 12/17/2024    CHOLHDL 28.6 12/17/2024     Plan:  -Continue home medication  -low fat/low calorie diet    History of DVT (deep vein thrombosis)  Currently on Eliquis outpatient.  Plan:  -continue home medication  -monitor H/H    Obstructive sleep apnea  Currently on CPAP outpatient.  Plan:  -continue CPAP q.h.s.    Obesity (BMI 30-39.9)  Body mass index is 31.07 kg/m². Morbid obesity complicates all aspects of disease management from diagnostic modalities to treatment. Weight loss encouraged and health benefits explained to patient.     VTE Risk Mitigation (From admission, onward)           Ordered     Reason for No Pharmacological VTE Prophylaxis  Once        Question:  Reasons:  Answer:  Already adequately anticoagulated on oral Anticoagulants    04/01/25 2035     IP VTE HIGH RISK PATIENT  Once         04/01/25 2035     Place sequential compression device  Until discontinued         04/01/25 2035                    Discharge Planning   GRETEL:      Code Status: Full Code   Medical Readiness for Discharge Date:                    Please place Justification for DME        Carlos Mathew MD  Department of Hospital Medicine   [facility] - Telemetry ([facility])

## 2025-04-03 NOTE — ASSESSMENT & PLAN NOTE
Patient's most recent potassium results are listed below.   Recent Labs     04/01/25  2334 04/02/25  0420 04/03/25  0954   K 3.3* 3.3* 3.3*     Plan  -Replete potassium per protocol  -Monitor potassium Daily  -Patient's hypokalemia is currently undergoing medical management

## 2025-04-03 NOTE — CONSULTS
Nephrology Consultation  Consulting Physician:  Dr. Mathew  Reason for consultation:  Kidney transplant  Informants:  Patient, family and medical records     History of Present Illness   The patient is a 69 y.o. male with a hx of ESRD who underwent living related kidney transplant about 10 years ago.  He has CKD stage 3 and is followed by Dr. Gonsalez of [facility].  History of CMV viremia with titer positive as recently as April of 2023 per outpatient notes from Dr. Gonsalez.  He has a multitude of chronic other medical conditions including CHF, type 2 diabetes, obesity etcetera.  He takes tacrolimus, prednisone, and mycophenolate for his immunosuppression.  Of note his mycophenolate was held for a period of time earlier in the year in the setting of his active C diff infection.  During his recent hospital stay this was assumed around March 20th.    Patient was most recently in the hospital about 2 weeks ago.  During the hospital stay he was treated with IV diuresis for acute on chronic heart failure.  He was also on oral vancomycin for recent C diff infection.  He was discharged March 22nd with a creatinine around 1.7.    Patient now presents yet again to the hospital April 1st and is admitted to the hospital by hospital medicine with lower extremity cellulitis.  We are consulted to assist in the care of this patient with renal transplant and CKD.  This morning his potassium was 3.3, BUN 35 and creatinine 1.9.    Since admission he has had some dysphagia and he just got back from a swallowing test.  He is tolerating p.o. but his intake has been less than usual.  He is on a fluid restriction, per family.  His daughter states that he voided yesterday and it appeared somewhat concentrated.  He currently feels like he needs to avoid and may have some mild lower urinary tract symptoms.  He no longer has loose stools and he has finished his vancomycin for C diff.    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 RNA NEGATIVE 5/2015, 2021, 2022    History of colon polyps     HPTH (hyperparathyroidism)     Hyperlipidemia     Hypertension associated with stage 3 chronic kidney disease due to type 2 diabetes mellitus     LBBB (left bundle branch block) 12/20/2021    Morbid obesity with BMI of 45.0-49.9, adult     Nephrolithiasis 6/7/2013    PCO (posterior capsular opacification), left 03/04/2019    Proteinuria     resolved s/p transplant    S/P kidney transplant     Sleep apnea     Type 2 diabetes, uncontrolled, with retinopathy     Type II diabetes mellitus with renal manifestations        SocHx:    Social History[1]    FamHx:    Family History   Problem Relation Name Age of Onset    Diabetes Mother      Hypertension Mother      Heart failure Mother      Heart failure Father      Kidney disease Sister          ESRD    Diabetes Sister      Diabetes Maternal Grandmother      Cancer Neg Hx         ROS:      Negative except as mentioned.     Health Status   Allergies:    is allergic to lisinopril, actos  [pioglitazone], and metformin.    Current medications:   Current Medications[2]     Physical Examination   VS/Measurements   /60   Pulse 97   Temp 99.5 °F (37.5 °C) (Oral)   Resp 19   Ht  6' (1.829 m)   Wt 103.9 kg (229 lb 0.9 oz)   SpO2 (!) 94%   BMI 31.07 kg/m²     Physical Exam  Vitals reviewed.   Constitutional:       General: He is not in acute distress.     Appearance: He is obese. He is toxic-appearing. He is not ill-appearing or diaphoretic.   HENT:      Head: Normocephalic and atraumatic.   Eyes:      General:         Right eye: No discharge.         Left eye: No discharge.   Cardiovascular:      Rate and Rhythm: Normal rate and regular rhythm.      Heart sounds:      No friction rub. No gallop.   Pulmonary:      Effort: Pulmonary effort is normal. No respiratory distress.      Breath sounds: Normal breath sounds. No wheezing or rales.   Abdominal:      General: Bowel sounds are normal. There is no distension.      Palpations: Abdomen is soft.      Tenderness: There is no abdominal tenderness.      Comments: Obese.  Nontender allograft.   Musculoskeletal:      Cervical back: Neck supple.      Right lower leg: No edema.      Left lower leg: No edema.      Comments: Cellulitis left lower extremity   Skin:     General: Skin is warm and dry.   Neurological:      Mental Status: He is alert and oriented to person, place, and time.   Psychiatric:         Mood and Affect: Mood normal.         Behavior: Behavior normal.         Thought Content: Thought content normal.         Judgment: Judgment normal.            Review / Management   Laboratory Results   Today's Lab Results :    Recent Results (from the past 24 hours)   POCT glucose    Collection Time: 04/02/25  6:03 PM   Result Value Ref Range    POCT Glucose 183 (H) 70 - 110 mg/dL   POCT glucose    Collection Time: 04/02/25 11:08 PM   Result Value Ref Range    POCT Glucose 190 (H) 70 - 110 mg/dL   POCT glucose    Collection Time: 04/03/25  6:44 AM   Result Value Ref Range    POCT Glucose 197 (H) 70 - 110 mg/dL   Basic metabolic panel    Collection Time: 04/03/25  9:54 AM   Result Value Ref Range    Sodium 135 (L) 136 - 145 mmol/L    Potassium  3.3 (L) 3.5 - 5.1 mmol/L    Chloride 102 95 - 110 mmol/L    CO2 21 (L) 23 - 29 mmol/L    Glucose 186 (H) 70 - 110 mg/dL    BUN 35 (H) 8 - 23 mg/dL    Creatinine 1.9 (H) 0.5 - 1.4 mg/dL    Calcium 8.0 (L) 8.7 - 10.5 mg/dL    Anion Gap 12 8 - 16 mmol/L    eGFR 37 (L) >60 mL/min/1.73/m2   Magnesium    Collection Time: 04/03/25  9:54 AM   Result Value Ref Range    Magnesium  1.7 1.6 - 2.6 mg/dL   CBC with Differential    Collection Time: 04/03/25  9:54 AM   Result Value Ref Range    WBC 2.60 (L) 3.90 - 12.70 K/uL    RBC 3.27 (L) 4.60 - 6.20 M/uL    HGB 7.9 (L) 14.0 - 18.0 gm/dL    HCT 27.7 (L) 40.0 - 54.0 %    MCV 85 82 - 98 fL    MCH 24.2 (L) 27.0 - 31.0 pg    MCHC 28.5 (L) 32.0 - 36.0 g/dL    RDW 17.9 (H) 11.5 - 14.5 %    Platelet Count 126 (L) 150 - 450 K/uL    MPV 11.3 9.2 - 12.9 fL    Nucleated RBC 0 <=0 /100 WBC   Manual Differential    Collection Time: 04/03/25  9:54 AM   Result Value Ref Range    Gran # (ANC) 1.5 K/uL    Segmented Neutrophil % 56.0 38.0 - 73.0 %    Bands % 2.0 %    Lymphocyte % 24.0 18.0 - 48.0 %    Monocyte % 13.0 4.0 - 15.0 %    Eosinophil % 3.0 0.0 - 8.0 %    Basophil % 2.0 (H) <=1.9 %    Platelet Estimate Decreased (A)      Anisocytosis Slight     Poik Slight     Ovalocytes Occasional     Tear drop cell Occasional    POCT glucose    Collection Time: 04/03/25  1:18 PM   Result Value Ref Range    POCT Glucose 191 (H) 70 - 110 mg/dL        Impression and Plan   Diagnosis     Acute kidney injury superimposed upon CKD.  ANGELITO likely due to volume depletion.  He looks a bit prerenal.  Encourage oral intake.  Possibly some LUTS- Check PVR.     CKD stage 3/chronic allograft nephropathy with baseline creatinine running 1.3-1.5.       Status post living related donor kidney transplantation in 2015.  Follows with Dr. Gonsalez.  On tacrolimus and on prednisone.  Mycophenolate held on admission in the setting of active infection.  Likely resume mycophenolate in the next day or so as cellulitis is treated.   Check tacro trough in the morning.     Hypertension and CKD.  Adequate control acutely.    Hypokalemia.  Replete repleted IV earlier today.    Hypomagnesemia.  Repleted.  Give another g today and recheck tomorrow     Multifactorial anemia.  On oral iron.  Monitor need for transfusion.     CKD-MBD/secondary hyperparathyroidism of renal origin.  On daily calcitriol.  Monitor corrected calcium.     Recent C diff infection.  No further loose stools.  He is status post vancomycin.    History of combined heart failure.  LVEF around 30-40%.  On low-dose loop diuretic.  Euvolemic.     Gout .     History of deep venous thrombosis.      Type 2 diabetes.  Per Hospital Medicine.    Obesity.  Complicating his management.    Dysphagia.  Status post swallow eval.    --thank you for allowing us to participate in the care of this patient.      .     ________________________________________________  Noel Coronado MD    This document was created using voice recognition software.  It is possible that there are errors which have persisted after original proofreading.  If there is a question regarding contents of this document please contact me for clarification.\         [1]   Social History  Socioeconomic History    Marital status: Legally     Number of children: 2   Occupational History    Occupation: retired     Employer: Retired   Tobacco Use    Smoking status: Former     Current packs/day: 0.00     Types: Cigarettes     Quit date: 2013     Years since quittin.8     Passive exposure: Past    Smokeless tobacco: Former     Quit date: 2013    Tobacco comments:     used marijuana since [id number], stopped after started dialysis   Substance and Sexual Activity    Alcohol use: No     Alcohol/week: 0.0 standard drinks of alcohol    Drug use: Not Currently     Comment:      Sexual activity: Never   Social History Narrative    . Lives with spouse. Has 2 children. Patient retired as  for [employer]. He has been washing cars.     Social Drivers of Health     Financial Resource Strain: Low Risk  (3/31/2025)    Overall Financial Resource Strain (CARDIA)     Difficulty of Paying Living Expenses: Not hard at all   Food Insecurity: No Food Insecurity (3/31/2025)    Hunger Vital Sign     Worried About Running Out of Food in the Last Year: Never true     Ran Out of Food in the Last Year: Never true   Transportation Needs: No Transportation Needs (3/31/2025)    PRAPARE - Transportation     Lack of Transportation (Medical): No     Lack of Transportation (Non-Medical): No   Physical Activity: Insufficiently Active (3/31/2025)    Exercise Vital Sign     Days of Exercise per Week: 2 days     Minutes of Exercise per Session: 30 min   Stress: No Stress Concern Present (3/31/2025)    [employer] [place] [facility] [employer] - Occupational Stress Questionnaire     Feeling of Stress : Not at all   Housing Stability: Low Risk  (3/31/2025)    Housing Stability Vital Sign     Unable to Pay for Housing in the Last Year: No     Number of Times Moved in the Last Year: 0     Homeless in the Last Year: No   [2]   Current Facility-Administered Medications:     acetaminophen suppository 650 mg, 650 mg, Rectal, Q6H PRN, Mihir Cao NP    acetaminophen tablet 650 mg, 650 mg, Oral, Q6H PRN, Mihir Cao NP    allopurinol split tablet 50 mg, 50 mg, Oral, Daily, Carlos Mathew MD, 50 mg at 04/03/25 1259    aluminum-magnesium hydroxide-simethicone 200-200-20 mg/5 mL suspension 30 mL, 30 mL, Oral, QID PRN, Mihir Cao NP    apixaban tablet 5 mg, 5 mg, Oral, BID, Carlos Mathew MD, 5 mg at 04/03/25 1259    aspirin EC tablet 81 mg, 81 mg, Oral, Daily, Carlos Mathew MD, 81 mg at 04/03/25 1259    calcitRIOL capsule 0.25 mcg, 0.25 mcg, Oral, Daily, Carlos Mathew MD, 0.25 mcg at 04/03/25 1259    ceFEPIme injection 2 g, 2 g, Intravenous, Q12H, Mihir Cao NP, 2 g at 04/03/25 0650    dextrose 50% injection  12.5 g, 12.5 g, Intravenous, PRN, Mihir Cao NP    dextrose 50% injection 25 g, 25 g, Intravenous, PRN, Mihir Cao NP    famotidine tablet 20 mg, 20 mg, Oral, QHS, Carlos Mathew MD    ferrous sulfate tablet 1 each, 1 tablet, Oral, Daily, Carlos Mathew MD, 1 each at 04/03/25 1259    furosemide tablet 40 mg, 40 mg, Oral, Daily, Carlos Mathew MD, 40 mg at 04/03/25 1259    glucagon (human recombinant) injection 1 mg, 1 mg, Intramuscular, PRN, Mihir Cao NP    glucose chewable tablet 16 g, 16 g, Oral, PRN, Mihir Cao NP    glucose chewable tablet 24 g, 24 g, Oral, PRN, Mihir Cao NP    HYDROcodone-acetaminophen 5-325 mg per tablet 1 tablet, 1 tablet, Oral, Q6H PRN, Mihir Cao NP, 1 tablet at 04/03/25 0840    insulin aspart U-100 pen 0-5 Units, 0-5 Units, Subcutaneous, QID (AC + HS) PRN, Mihir Cao NP    insulin glargine U-100 (Lantus) pen 10 Units, 10 Units, Subcutaneous, BID, Carlos Mathew MD, 10 Units at 04/03/25 1338    melatonin tablet 6 mg, 6 mg, Oral, Nightly PRN, Mihir Cao NP    metoprolol succinate (TOPROL-XL) 24 hr tablet 25 mg, 25 mg, Oral, Daily, Carlos Mathew MD, 25 mg at 04/03/25 1259    morphine injection 2 mg, 2 mg, Intravenous, Q4H PRN, Mihir Cao NP    naloxone 0.4 mg/mL injection 0.02 mg, 0.02 mg, Intravenous, PRN, Mihir Cao NP    ondansetron injection 4 mg, 4 mg, Intravenous, Q8H PRN, Mihir Cao NP    polyethylene glycol packet 17 g, 17 g, Oral, Daily PRN, [place], Mihir J, NP    potassium chloride 10 mEq in 100 mL IVPB, 10 mEq, Intravenous, Q1H, Carlos Mathew MD    predniSONE tablet 5 mg, 5 mg, Oral, Daily, Carlos Mathew MD, 5 mg at 04/03/25 1259    promethazine tablet 25 mg, 25 mg, Oral, Q6H PRN, Mihir Cao, NP    simethicone chewable tablet 80 mg, 1 tablet, Oral, QID PRN, Mihir Cao NP    sodium bicarbonate tablet 650 mg, 650 mg, Oral, BID, Carlos Mathew MD, 650 mg at  04/03/25 1259    sodium chloride 0.9% flush 3 mL, 3 mL, Intravenous, Q12H PRN, Mihir Cao, NP    tacrolimus capsule 4 mg, 4 mg, Oral, BID, Carlos Mathew MD

## 2025-04-03 NOTE — ASSESSMENT & PLAN NOTE
Chronic, uncontrolled.  Latest blood pressure and vitals reviewed-   Temp:  [98.5 °F (36.9 °C)-99.8 °F (37.7 °C)]   Pulse:  []   Resp:  [17-19]   BP: (112-158)/(60-69)   SpO2:  [94 %-98 %] .   Home meds for hypertension were reviewed and noted below.   Hypertension Medications              furosemide (LASIX) 40 MG tablet Take 1 tablet (40 mg total) by mouth once daily.    metoprolol succinate (TOPROL-XL) 25 MG 24 hr tablet Take 1 tablet (25 mg total) by mouth once daily.    sacubitriL-valsartan (ENTRESTO)  mg per tablet ([Paused] since 3/11/2025 12:59 PM) Take 1 tablet by mouth 2 (two) times daily.     While in the hospital, will manage blood pressure as follows; Continue home antihypertensive regimen    Will utilize p.r.n. blood pressure medication only if patient's blood pressure greater than  180/110 and he develops symptoms such as worsening chest pain or shortness of breath.

## 2025-04-03 NOTE — PT/OT/SLP PROGRESS
Occupational Therapy   Treatment    Name: Mitch Whittaker  MRN: [id number]  Admitting Diagnosis:  Cellulitis of left lower extremity       Recommendations:     Discharge Recommendations: Moderate Intensity Therapy  Discharge Equipment Recommendations:  none  Barriers to discharge:  None    Assessment:     Mitch Whittaker is a 69 y.o. male with a medical diagnosis of Cellulitis of left lower extremity.  He presents with the following performance deficits affecting function are weakness, impaired endurance, decreased coordination, decreased safety awareness, gait instability, impaired balance.     Rehab Prognosis:  Poor; patient would benefit from acute skilled OT services to address these deficits and reach maximum level of function.       Plan:     Patient to be seen 2 x/week to address the above listed problems via self-care/home management, therapeutic activities, therapeutic exercises  Plan of Care Expires: 04/16/25  Plan of Care Reviewed with: patient    Subjective     Chief Complaint: Stiffness   Patient/Family Comments/goals: Recovery  Pain/Comfort:  Pain Rating 1: other (see comments) (pt unable to rate pain, but asserted pain with mobility in any direction)    Objective:     Communicated with: Nurse and EPIC prior to session.  Patient found HOB elevated with peripheral IV, telemetry upon OT entry to room.    General Precautions: Standard, fall, special contact    Orthopedic Precautions:N/A  Braces: N/A  Respiratory Status: Room air     Occupational Performance:     Bed Mobility:    Unable/ unwilling to perform bed mobility d/t bilateral knee pain and overall body stiffness    Functional Mobility/Transfers:  Not recommended at this time      [facility] 6 Click ADL: 9    Treatment & Education:  Pt stated pain with any mobility or ROM. Pt required max encouragement to participate in TX.   Pt completed the following Therex 1x10 in order to increase BM and FM:   Shoulder flexion   Elbow Flexion   Hand  squeezes    Shoulder Abduction touches   Prolonged Towel Knee stretch  OT role, plan of care, progression of goals, importance of continued OOB activity, ADL/functional transfer and mobility retraining, call don't fall, safety precautions, fall prevention. Pt educated on sitting in chair for 1 hour during breakfast lunch and dinner in order to change positions, regulate BP and reduce bed sores.   Pt acknowledges and agrees with POC given by OT.      Patient left with bed in chair position with all lines intact, call button in reach, and bed alarm on    GOALS:   Multidisciplinary Problems       Occupational Therapy Goals          Problem: Occupational Therapy    Goal Priority Disciplines Outcome Interventions   Occupational Therapy Goal     OT, PT/OT Not Progressing    Description: O.T. GOALS TO BE MET 4-16-25  PT WILL TOLERATE 1 SET X 20 REPS B UE ROM EXERCISE  MOD A WITH SUPINE<SIT TRANSFERS  MAX A WITH BSC TRANSFERS  MIN A WITH ROLLING L<>R                           Time Tracking:     OT Date of Treatment: 04/03/25  OT Start Time: 0950  OT Stop Time: 1020  OT Total Time (min): 30 min    Billable Minutes:Therapeutic Activity 15  Therapeutic Exercise 15    OT/ANNIA: ANNIA     Number of ANNIA visits since last OT visit: 1  A client care conference was performed between the JONNA and ZABRINA, prior to treatment by ZABRINA, to discuss the patient's status, treatment plan and established goals.  ZABRINA Mancilla    4/3/2025

## 2025-04-03 NOTE — ASSESSMENT & PLAN NOTE
"Patient has Abnormal Magnesium: hypomagnesemia. Will continue to monitor electrolytes closely. Will replace the affected electrolytes and repeat labs to be done after interventions completed. The patient's magnesium results have been reviewed and are listed below.  No results for input(s): "MG" in the last 24 hours.    "

## 2025-04-03 NOTE — PROGRESS NOTES
[facility] Medicine  Progress Note    Patient Name: Mitch Whittaker  MRN: [id number]  Patient Class: IP- Inpatient   Admission Date: 4/1/2025  Length of Stay: 2 days  Attending Physician: Carlos Mathew MD  Primary Care Provider: Valery Caal MD        Subjective     Principal Problem:Cellulitis of left lower extremity        HPI:  Mitch Whittaker is a 69 y.o. 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), Diabetic retinopathy, DM (diabetes mellitus), type 2 with complications (1994), Edema, End stage kidney disease, Gallbladder polyp, Heart failure, diastolic, due to HTN, Hemodialysis status, 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 who presented to the ED for further evaluation of worsening left leg pain and swelling which began last night.  Patient reports being bed-bound but suffers from chronic venous stasis and recurrent skin infections.  Patient reported symptoms began acutely with no known alleviating or aggravating factors noted with a associated symptoms including nonproductive cough in addition to those noted above.  He denied  endorsing any recent trauma to the affected area and reported being in his usual state of health prior to onset of symptoms.  All other review of systems negative except as noted above.  Initial workup in the ED revealed patient met SIRS criteria for sepsis with concerns for underlying cellulitis and was initiated on cefepime.  Remaining laboratory workup revealed H/H 9.6/31.8, potassium 2.8, creatinine/GFR 1.9/37, blood glucose 61, magnesium 1.1, troponin 0.152, lactic acid within normal limits, procalcitonin 1.60, chest x-ray negative for acute findings.  Patient admitted to Hospital Medicine inpatient for continued medical management.    PCP: Valery Caal      Overview/Hospital Course:    4/2/25  Patient admitted or LLE cellulitis, continue cefepime  Noted bilateral chronic venous stasis, Wound Care consulted  Reports BLE weakness x 2 months, difficulty ambulating  Replete K and Mg  PT/OT consult    4/3/25  Patient with dysphagia and early satiety, ST consulted  Hx of Tacrolimus induced GI toxicity  Upper GI FL pending  Patient with renal transplant, on tacrolimus and cellcept  Restart tacrolimus, consult Nephrology for assitance in transplant management  Continue cefepime for LLE celulitis  PT/OT with reccs for JOSE, consult SW for SNF placement        Review of Systems   All other systems reviewed and are negative.    Objective:     Vital Signs (Most Recent):  Temp: 98.5 °F (36.9 °C) (04/03/25 1541)  Pulse: 102 (04/03/25 1541)  Resp: 19 (04/03/25 1541)  BP: (!) 150/69 (04/03/25 1541)  SpO2: 96 % (04/03/25 1541) Vital Signs (24h Range):  Temp:  [98.5 °F (36.9 °C)-99.8 °F (37.7 °C)] 98.5 °F (36.9 °C)  Pulse:  [] 102  Resp:  [17-19] 19  SpO2:  [94 %-98 %] 96 %  BP: (112-158)/(60-69) 150/69     Weight: 103.9 kg (229 lb 0.9 oz)  Body mass index is 31.07 kg/m².  No intake or output data in the 24 hours ending 04/03/25 1744      Physical Exam  Constitutional:       General: He is in acute distress.       Appearance: Normal appearance. He is obese. He is ill-appearing.   Cardiovascular:      Rate and Rhythm: Normal rate and regular rhythm.      Heart sounds: No murmur heard.  Pulmonary:      Effort: Pulmonary effort is normal. No respiratory distress.      Breath sounds: Normal breath sounds. No wheezing.   Abdominal:      General: There is no distension.      Palpations: Abdomen is soft.      Tenderness: There is no abdominal tenderness.   Musculoskeletal:      Right lower leg: Edema present.      Left lower leg: Edema present.   Skin:     Findings: Erythema present.   Neurological:      Mental Status: He is alert.               Significant Labs: All pertinent labs within the past 24 hours have been reviewed.    FL Upper GI   Final Result      No significant stenosis or narrowing of the esophagus demonstrate.  Lateral esophagram was unable to be obtained secondary to patient mobility.      Tertiary contractions.      GERD.         Electronically signed by: Salty Moreno   Date:    04/03/2025   Time:    15:48      X-Ray Chest AP Portable   Final Result   No acute cardiopulmonary disease.      Finalized on: 4/1/2025 7:26 PM By:  Raudel Mathew   [facility]# [id number]      2025-04-01 19:28:36.851     [facility]          Recent Lab Results  (Last 5 results in the past 24 hours)        04/03/25  1625   04/03/25  1318   04/03/25  0954   04/03/25  0644   04/02/25  2308        Anion Gap     12           Aniso     Slight           Bands     2.0           Basophil %     2.0           BUN     35           Calcium     8.0           Chloride     102           CO2     21           Creatinine     1.9           eGFR     37  Comment: Estimated GFR calculated using the CKD-EPI creatinine (2021) equation.           Eos %     3.0           Glucose     186           Gran # (ANC)     1.5           Hematocrit     27.7           Hemoglobin     7.9           Lymph %     24.0           Magnesium      1.7           MCH     24.2           MCHC     28.5            MCV     85           Mono %     13.0           MPV     11.3           nRBC     0           Ovalocytes     Occasional           Platelet Estimate     Decreased           Platelet Count     126           POCT Glucose 222   191     197   190       Poikilocytosis     Slight           Potassium     3.3           RBC     3.27           RDW     17.9           Segmented Neutrophil %     56.0           Sodium     135           Teardrop Cells     Occasional           WBC     2.60                                  Significant Imaging: I have reviewed all pertinent imaging results/findings within the past 24 hours.    FL Upper GI   Final Result      No significant stenosis or narrowing of the esophagus demonstrate.  Lateral esophagram was unable to be obtained secondary to patient mobility.      Tertiary contractions.      GERD.         Electronically signed by: Salty Moreno   Date:    04/03/2025   Time:    15:48      X-Ray Chest AP Portable   Final Result   No acute cardiopulmonary disease.      Finalized on: 4/1/2025 7:26 PM By:  Raudel Mathew   [facility]# [id number]      2025-04-01 19:28:36.851     [facility]              Assessment & Plan  Cellulitis of left lower extremity  Continue cefepime  Wound care    Chronic venous insufficiency of lower extremity      Sepsis  This patient does have evidence of infective focus  My overall impression is sepsis.  Source: Skin and Soft Tissue (location left leg cellulitis, gout of left knee)  Antibiotics given-   Antibiotics (72h ago, onward)      Start     Stop Route Frequency Ordered    04/02/25 0600  ceFEPIme injection 2 g         -- IV Every 12 hours (non-standard times) 04/01/25 2036          Latest lactate reviewed-  Recent Labs   Lab 04/01/25  2212   LACTATE 0.7     Organ dysfunction indicated by Acute kidney injury    Fluid challenge Ideal Body Weight- The patient's ideal body weight is Ideal body weight: 77.6 kg (171 lb 1.2 oz) which will be used to calculate fluid bolus of 30  "ml/kg for treatment of septic shock.      Post- resuscitation assessment No - Post resuscitation assessment not needed     Will Not start Pressors- Levophed for MAP of 65    Source control achieved by: Cefepime    Follow up uric acid level and consider ortho consult for left knee aspiration to fully rule out gout flare.    Weakness of both lower extremities  Patient reported worsening bilateral lower extremity weakness times 2-3 months in which he reported symptoms began acutely after his right leg gave out going to the restroom.  Patient denies endorsing any back pain, experiencing ground level fall/trauma, but does report fecal incontinence when urinating.  Patient has since been bed-bound in his not been seen/evaluated by a physician for these symptoms.  Plan:  -replete electrolytes  -consider obtaining spine imaging for further evaluation  -PT/OT    Hypokalemia  Patient's most recent potassium results are listed below.   Recent Labs     04/01/25  2334 04/02/25  0420 04/03/25  0954   K 3.3* 3.3* 3.3*     Plan  -Replete potassium per protocol  -Monitor potassium Daily  -Patient's hypokalemia is  currently undergoing medical management    Hypomagnesemia  Patient has Abnormal Magnesium: hypomagnesemia. Will continue to monitor electrolytes closely. Will replace the affected electrolytes and repeat labs to be done after interventions completed. The patient's magnesium results have been reviewed and are listed below.  Recent Labs   Lab 04/03/25  0954   MG 1.7       Chronic combined systolic and diastolic congestive heart failure  Patient has Combined Systolic and Diastolic heart failure that is Chronic. On presentation their CHF was well compensated. Most recent BNP and echo results are listed below.  No results for input(s): "BNP" in the last 72 hours.  Latest ECHO  Results for orders placed during the hospital encounter of 03/13/25    Echo Saline Bubble? No    Interpretation Summary    Left Ventricle: The left " ventricle is normal in size. Mildly increased ventricular mass. Mildly increased wall thickness. There is mild concentric hypertrophy. Normal wall motion. Septal motion is consistent with bundle branch block. There is moderately reduced systolic function with a visually estimated ejection fraction of 35 - 40%. Grade I diastolic dysfunction.    Right Ventricle: The right ventricle is normal in size. Wall thickness is normal. Systolic function is normal.    Left Atrium: Mildly dilated    Aorta: Aortic annulus is mildly dilated measuring 4.01 cm. Ascending aorta is normal measuring 3.69 cm.    Pulmonary Artery: The estimated pulmonary artery systolic pressure is 24 mmHg.    IVC/SVC: Normal venous pressure at 3 mmHg.    Current Heart Failure Medications  metoprolol succinate (TOPROL-XL) 24 hr tablet 25 mg, Daily, Oral  furosemide tablet 40 mg, Daily, Oral    Plan  -Monitor strict I&Os and daily weights.    -Place on telemetry  -Low sodium diet  -Place on fluid restriction of 1.5 L.   -Cardiology has not been consulted  -The patient's volume status is at their baseline  -Continue home medications    Hypertension  Chronic, uncontrolled.  Latest blood pressure and vitals reviewed-   Temp:  [98.5 °F (36.9 °C)-99.8 °F (37.7 °C)]   Pulse:  []   Resp:  [17-19]   BP: (112-158)/(60-69)   SpO2:  [94 %-98 %] .   Home meds for hypertension were reviewed and noted below.   Hypertension Medications              furosemide (LASIX) 40 MG tablet Take 1 tablet (40 mg total) by mouth once daily.    metoprolol succinate (TOPROL-XL) 25 MG 24 hr tablet Take 1 tablet (25 mg total) by mouth once daily.    sacubitriL-valsartan (ENTRESTO)  mg per tablet ([Paused] since 3/11/2025 12:59 PM) Take 1 tablet by mouth 2 (two) times daily.     While in the hospital, will manage blood pressure as follows; Continue home antihypertensive regimen    Will utilize p.r.n. blood pressure medication only if patient's blood pressure greater than   180/110 and he develops symptoms such as worsening chest pain or shortness of breath.    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  Last A1c reviewed-   Lab Results   Component Value Date    HGBA1C 5.7 (H) 12/17/2024     Most recent fingerstick glucose reviewed-   Recent Labs   Lab 04/02/25  2308 04/03/25  0644 04/03/25  1318 04/03/25  1625   POCTGLUCOSE 190* 197* 191* 222*     Current correctional scale  Low  Titrate as needed  anti-hyperglycemic dose as follows-   Antihyperglycemics (From admission, onward)      Start     Stop Route Frequency Ordered    04/03/25 1245  insulin glargine U-100 (Lantus) pen 10 Units         -- SubQ 2 times daily 04/03/25 1136    04/01/25 2134  insulin aspart U-100 pen 0-5 Units         -- SubQ Before meals & nightly PRN 04/01/25 2035          Plan:  -SSI  -A1c  -Accu-checks  -Hold oral hypoglycemics while patient is in the hospital  -Continue home long-acting insulin at 20% decrease, titrate up as needed  -Hypoglycemic protocol      Anemia, chronic disease  Anemia is likely due to chronic disease due to Chronic Kidney Disease. Most recent hemoglobin and hematocrit are listed below.  Recent Labs     04/01/25  1804 04/02/25  0420 04/03/25  0954   HGB 9.6* 9.1* 7.9*   HCT 31.8* 31.4* 27.7*     Plan  -Monitor serial CBC: Daily  -Transfuse PRBC if patient becomes hemodynamically unstable, symptomatic or H/H drops below 7/21.  -Patient has not received any PRBC transfusions to date  -Patient's anemia is currently stable    Coronary artery disease of native artery of native heart with stable angina pectoris  Patient with known CAD, which is controlled.  EKG negative for signs of acute ischemia.  Troponin elevated at 0.15 to likely secondary to demand ischemia in setting of sepsis and CHF. Will continue  home medications  and monitor for  S/Sx of angina/ACS. Continue to monitor on telemetry.    Chronic immunosuppression with Prograf, MMF and prednisone  Patient currently on Prograf, CellCept, and mycophenolate.  Plan:  -continue Prograf and CellCept  -holding mycophenolate    Hyperlipidemia associated with type 2 diabetes mellitus  Patient is chronically on statin.will continue for now. Last Lipid Panel:   Lab Results   Component Value Date    CHOL 175 12/17/2024    HDL 50 12/17/2024    LDLCALC 98.2 12/17/2024    TRIG 134 12/17/2024    CHOLHDL 28.6 12/17/2024     Plan:  -Continue home medication  -low fat/low calorie diet    History of DVT (deep vein thrombosis)  Currently on Eliquis outpatient.  Plan:  -continue home medication  -monitor H/H    Obstructive sleep apnea  Currently on CPAP outpatient.  Plan:  -continue CPAP q.h.s.    Obesity (BMI 30-39.9)  Body mass index is 31.07 kg/m². Morbid obesity complicates all aspects of disease management from diagnostic modalities to treatment. Weight loss encouraged and health benefits explained to patient.     Thrombocytopenia  The likely etiology of thrombocytopenia is infection. The patients 3 most recent labs are listed below.  Recent Labs     04/01/25  1804 04/02/25  0420 04/03/25  0954    147* 126*     Plan  - Will transfuse if platelet count is <10k.    VTE Risk Mitigation (From admission, onward)           Ordered     apixaban tablet 5 mg  2 times daily         04/03/25 1141     Reason for No Pharmacological VTE Prophylaxis  Once        Question:  Reasons:  Answer:  Already adequately anticoagulated on oral Anticoagulants    04/01/25 2035     IP VTE HIGH RISK PATIENT  Once         04/01/25 2035     Place sequential compression device  Until discontinued         04/01/25 2035                    Discharge Planning   GRETEL:      Code Status: Full Code   Medical Readiness for Discharge Date:   Discharge Plan A: Home Health, Home with family                Please place Justification for  ALEX Mathew MD  Department of Hospital Medicine   '[facility] - Telemetry ([facility])

## 2025-04-04 LAB
ABSOLUTE NEUTROPHIL MANUAL (OHS): 1.3 K/UL
ALBUMIN SERPL BCP-MCNC: 1.8 G/DL (ref 3.5–5.2)
ANION GAP (OHS): 11 MMOL/L (ref 8–16)
ANISOCYTOSIS BLD QL SMEAR: SLIGHT
BUN SERPL-MCNC: 38 MG/DL (ref 8–23)
CALCIUM SERPL-MCNC: 8.3 MG/DL (ref 8.7–10.5)
CHLORIDE SERPL-SCNC: 101 MMOL/L (ref 95–110)
CO2 SERPL-SCNC: 20 MMOL/L (ref 23–29)
CREAT SERPL-MCNC: 1.8 MG/DL (ref 0.5–1.4)
DACRYOCYTES BLD QL SMEAR: ABNORMAL
ERYTHROCYTE [DISTWIDTH] IN BLOOD BY AUTOMATED COUNT: 17.2 % (ref 11.5–14.5)
GFR SERPLBLD CREATININE-BSD FMLA CKD-EPI: 40 ML/MIN/1.73/M2
GLUCOSE SERPL-MCNC: 169 MG/DL (ref 70–110)
HCT VFR BLD AUTO: 26.6 % (ref 40–54)
HGB BLD-MCNC: 7.9 GM/DL (ref 14–18)
HYPOCHROMIA BLD QL SMEAR: ABNORMAL
LARGE/GIANT PLATELETS (OHS): PRESENT
LYMPHOCYTES NFR BLD MANUAL: 25 % (ref 18–48)
MAGNESIUM SERPL-MCNC: 1.8 MG/DL (ref 1.6–2.6)
MCH RBC QN AUTO: 24.8 PG (ref 27–31)
MCHC RBC AUTO-ENTMCNC: 29.7 G/DL (ref 32–36)
MCV RBC AUTO: 84 FL (ref 82–98)
MONOCYTES NFR BLD MANUAL: 15 % (ref 4–15)
NEUTROPHILS NFR BLD MANUAL: 60 % (ref 38–73)
NUCLEATED RBC (/100WBC) (OHS): 0 /100 WBC
OVALOCYTES BLD QL SMEAR: ABNORMAL
PHOSPHATE SERPL-MCNC: 2.9 MG/DL (ref 2.7–4.5)
PLATELET # BLD AUTO: 145 K/UL (ref 150–450)
PLATELET BLD QL SMEAR: ABNORMAL
PMV BLD AUTO: 11.8 FL (ref 9.2–12.9)
POCT GLUCOSE: 150 MG/DL (ref 70–110)
POCT GLUCOSE: 160 MG/DL (ref 70–110)
POCT GLUCOSE: 184 MG/DL (ref 70–110)
POCT GLUCOSE: 226 MG/DL (ref 70–110)
POIKILOCYTOSIS BLD QL SMEAR: SLIGHT
POTASSIUM SERPL-SCNC: 3.1 MMOL/L (ref 3.5–5.1)
PROCALCITONIN SERPL-MCNC: 2.72 NG/ML
RBC # BLD AUTO: 3.18 M/UL (ref 4.6–6.2)
SODIUM SERPL-SCNC: 132 MMOL/L (ref 136–145)
TACROLIMUS BLD-MCNC: 2.7 NG/ML (ref 5–15)
WBC # BLD AUTO: 2.14 K/UL (ref 3.9–12.7)

## 2025-04-04 PROCEDURE — 27000207 HC ISOLATION

## 2025-04-04 PROCEDURE — 83735 ASSAY OF MAGNESIUM: CPT | Performed by: INTERNAL MEDICINE

## 2025-04-04 PROCEDURE — 36415 COLL VENOUS BLD VENIPUNCTURE: CPT | Performed by: HOSPITALIST

## 2025-04-04 PROCEDURE — 92526 ORAL FUNCTION THERAPY: CPT

## 2025-04-04 PROCEDURE — 21400001 HC TELEMETRY ROOM

## 2025-04-04 PROCEDURE — 80197 ASSAY OF TACROLIMUS: CPT | Performed by: INTERNAL MEDICINE

## 2025-04-04 PROCEDURE — 97530 THERAPEUTIC ACTIVITIES: CPT | Mod: CQ

## 2025-04-04 PROCEDURE — 36415 COLL VENOUS BLD VENIPUNCTURE: CPT | Performed by: INTERNAL MEDICINE

## 2025-04-04 PROCEDURE — 80069 RENAL FUNCTION PANEL: CPT | Performed by: INTERNAL MEDICINE

## 2025-04-04 PROCEDURE — 85027 COMPLETE CBC AUTOMATED: CPT | Performed by: INTERNAL MEDICINE

## 2025-04-04 PROCEDURE — 63600175 PHARM REV CODE 636 W HCPCS: Performed by: NURSE PRACTITIONER

## 2025-04-04 PROCEDURE — 25000003 PHARM REV CODE 250: Performed by: NURSE PRACTITIONER

## 2025-04-04 PROCEDURE — 97110 THERAPEUTIC EXERCISES: CPT

## 2025-04-04 PROCEDURE — 63600175 PHARM REV CODE 636 W HCPCS: Performed by: HOSPITALIST

## 2025-04-04 PROCEDURE — 25000003 PHARM REV CODE 250: Performed by: HOSPITALIST

## 2025-04-04 PROCEDURE — 97530 THERAPEUTIC ACTIVITIES: CPT

## 2025-04-04 PROCEDURE — 99232 SBSQ HOSP IP/OBS MODERATE 35: CPT | Mod: ,,, | Performed by: INTERNAL MEDICINE

## 2025-04-04 PROCEDURE — 84145 PROCALCITONIN (PCT): CPT | Performed by: HOSPITALIST

## 2025-04-04 RX ORDER — PANTOPRAZOLE SODIUM 40 MG/1
40 TABLET, DELAYED RELEASE ORAL DAILY
Status: DISCONTINUED | OUTPATIENT
Start: 2025-04-04 | End: 2025-04-11 | Stop reason: HOSPADM

## 2025-04-04 RX ORDER — POTASSIUM CHLORIDE 20 MEQ/1
20 TABLET, EXTENDED RELEASE ORAL 3 TIMES DAILY
Status: COMPLETED | OUTPATIENT
Start: 2025-04-04 | End: 2025-04-05

## 2025-04-04 RX ORDER — MAGNESIUM SULFATE HEPTAHYDRATE 40 MG/ML
2 INJECTION, SOLUTION INTRAVENOUS ONCE
Status: COMPLETED | OUTPATIENT
Start: 2025-04-04 | End: 2025-04-04

## 2025-04-04 RX ADMIN — METOPROLOL SUCCINATE 25 MG: 25 TABLET, EXTENDED RELEASE ORAL at 09:04

## 2025-04-04 RX ADMIN — POTASSIUM CHLORIDE 20 MEQ: 1500 TABLET, EXTENDED RELEASE ORAL at 08:04

## 2025-04-04 RX ADMIN — HYDROCODONE BITARTRATE AND ACETAMINOPHEN 1 TABLET: 5; 325 TABLET ORAL at 09:04

## 2025-04-04 RX ADMIN — SODIUM BICARBONATE 650 MG: 650 TABLET ORAL at 09:04

## 2025-04-04 RX ADMIN — HYDROCODONE BITARTRATE AND ACETAMINOPHEN 1 TABLET: 5; 325 TABLET ORAL at 06:04

## 2025-04-04 RX ADMIN — APIXABAN 5 MG: 2.5 TABLET, FILM COATED ORAL at 09:04

## 2025-04-04 RX ADMIN — POTASSIUM CHLORIDE 20 MEQ: 1500 TABLET, EXTENDED RELEASE ORAL at 02:04

## 2025-04-04 RX ADMIN — APIXABAN 5 MG: 2.5 TABLET, FILM COATED ORAL at 08:04

## 2025-04-04 RX ADMIN — FERROUS SULFATE TAB 325 MG (65 MG ELEMENTAL FE) 1 EACH: 325 (65 FE) TAB at 09:04

## 2025-04-04 RX ADMIN — CEFEPIME 2 G: 2 INJECTION, POWDER, FOR SOLUTION INTRAVENOUS at 05:04

## 2025-04-04 RX ADMIN — TACROLIMUS 4 MG: 1 CAPSULE ORAL at 05:04

## 2025-04-04 RX ADMIN — PANTOPRAZOLE SODIUM 40 MG: 40 TABLET, DELAYED RELEASE ORAL at 02:04

## 2025-04-04 RX ADMIN — ASPIRIN 81 MG: 81 TABLET, COATED ORAL at 09:04

## 2025-04-04 RX ADMIN — INSULIN GLARGINE 10 UNITS: 100 INJECTION, SOLUTION SUBCUTANEOUS at 09:04

## 2025-04-04 RX ADMIN — PREDNISONE 5 MG: 5 TABLET ORAL at 09:04

## 2025-04-04 RX ADMIN — POTASSIUM CHLORIDE 20 MEQ: 1500 TABLET, EXTENDED RELEASE ORAL at 09:04

## 2025-04-04 RX ADMIN — CALCITRIOL CAPSULES 0.25 MCG 0.25 MCG: 0.25 CAPSULE ORAL at 09:04

## 2025-04-04 RX ADMIN — ALLOPURINOL 50 MG: 300 TABLET ORAL at 09:04

## 2025-04-04 RX ADMIN — MAGNESIUM SULFATE HEPTAHYDRATE 2 G: 40 INJECTION, SOLUTION INTRAVENOUS at 10:04

## 2025-04-04 RX ADMIN — TACROLIMUS 4 MG: 1 CAPSULE ORAL at 09:04

## 2025-04-04 RX ADMIN — SODIUM BICARBONATE 650 MG: 650 TABLET ORAL at 08:04

## 2025-04-04 RX ADMIN — INSULIN GLARGINE 10 UNITS: 100 INJECTION, SOLUTION SUBCUTANEOUS at 08:04

## 2025-04-04 RX ADMIN — INSULIN ASPART 1 UNITS: 100 INJECTION, SOLUTION INTRAVENOUS; SUBCUTANEOUS at 08:04

## 2025-04-04 RX ADMIN — FUROSEMIDE 40 MG: 40 TABLET ORAL at 09:04

## 2025-04-04 NOTE — PT/OT/SLP PROGRESS
Physical Therapy  Treatment    Mitch Whittaker   MRN: [id number]   Admitting Diagnosis: Cellulitis of left lower extremity    PT Received On: 04/04/25  PT Start Time: 0800     PT Stop Time: 0830    PT Total Time (min): 30 min       Billable Minutes:  Therapeutic Activity 30    Treatment Type: Treatment  PT/PTA: PTA     Number of PTA visits since last PT visit: 2       General Precautions: Standard, fall, special contact  Orthopedic Precautions: N/A  Braces: N/A  Respiratory Status: Room air    Spiritual, Cultural Beliefs, Temple Practices, Values that Affect Care: no    Subjective:  Completed Epic chart review prior to PT session.   Patient resistant to participate in PT session due to pain and fear of worsening pain.   Frequently distractible with conversation and required cues to remain on task or redirect towards task.     Pain/Comfort  Pain Rating 1: 10/10 (NECK AND BLE)  Pain Addressed 1: Other (see comments) (ACTIVITY PACING WITH GENTLE STRETCHING)    Objective:   Patient found with: peripheral IV, telemetry    Completed cervical rotation stretch to R/L with cued active engagement due to complaints of pain  X5 reps per side with prolonged holds    AAROM heel slides x10 reps (significantly limited ROM but some effort noted to complete movement)    AP x10 reps with VC to increase ROM effort    Reaching across midline to targets with cued cervical rotation and shoulder lift x5 reps per side  Discussed how this activity would carry over into patient assisting with bed mobility and importance of patient effort during mobility    Educated patient multiple times on importance of increased tolerance to upright position and direct impact on CV endurance and strength. Patient encouraged to utilize elevated HOB to simulate chair position until able to safely complete chair T/F. Patient given a minimum goal of majority of the day to be spent in upright, especially with all meals. Encouraged patient to perform AROM TE to  BLE throughout the day within all available planes of motion. Re enforced importance of utilizing call light to meet needs in room and not attempt to get up without staff assistance. Unsure of patient's level of retention/carryover.       AM-PAC 6 CLICK MOBILITY  How much help from another person does this patient currently need?   1 = Unable, Total/Dependent Assistance  2 = A lot, Maximum/Moderate Assistance  3 = A little, Minimum/Contact Guard/Supervision  4 = None, Modified [place]/Independent    Turning over in bed (including adjusting bedclothes, sheets and blankets)?: 1  Sitting down on and standing up from a chair with arms (e.g., wheelchair, bedside commode, etc.): 1  Moving from lying on back to sitting on the side of the bed?: 1  Moving to and from a bed to a chair (including a wheelchair)?: 1  Need to walk in hospital room?: 1  Climbing 3-5 steps with a railing?: 1  Basic Mobility Total Score: 6    AM-PAC Raw Score CMS G-Code Modifier Level of Impairment Assistance   6 % Total / Unable   7 - 9 CM 80 - 100% Maximal Assist   10 - 14 CL 60 - 80% Moderate Assist   15 - 19 CK 40 - 60% Moderate Assist   20 - 22 CJ 20 - 40% Minimal Assist   23 CI 1-20% SBA / CGA   24 CH 0% Independent/ Mod I     Patient left HOB elevated with call button in reach and bed alarm on.    Assessment:  Mitch Whittaker is a 69 y.o. male with a medical diagnosis of Cellulitis of left lower extremity and presents with overall decline in functional mobility. Patient would continue to benefit from skilled PT to address functional limitations listed below in order to return to PLOF/decrease caregiver burden. Patient demonstrates several self limiting behaviors and resistance to movement which limits his progress and prognosis with therapy. Despite multiple education attempts each session, patient remains resistant. Lack of motivation also a major limiting factor.     Rehab identified problem list/impairments: weakness, impaired  endurance, impaired self care skills, impaired functional mobility, impaired balance, pain, decreased safety awareness, decreased lower extremity function, decreased upper extremity function, decreased coordination, impaired cognition, decreased ROM, impaired cardiopulmonary response to activity    Rehab potential is fair.    Activity tolerance: Poor    Discharge recommendations: Moderate Intensity Therapy      Barriers to discharge:      Equipment recommendations: to be determined by next level of care     GOALS:   Multidisciplinary Problems       Physical Therapy Goals          Problem: Physical Therapy    Goal Priority Disciplines Outcome Interventions   Physical Therapy Goal     PT, PT/OT     Description: Pt will perform bed mobility with mod A in order to participate in EOB activity.  Pt will perform transfers with mod A in order to participate in OOB activity.  Pt will ambulate 50 ft mod I with LRAD in order to participate in daily tasks.   Pt will tolerate sitting OOB in chair x 2-4 hrs in order to participate in daily tasks.                        DME Justifications:  No DME recommended requiring DME justifications    PLAN:    Patient to be seen 3 x/week to address the above listed problems via therapeutic activities, therapeutic exercises, neuromuscular re-education  Plan of Care expires: 04/16/25  Plan of Care reviewed with: patient         04/04/2025

## 2025-04-04 NOTE — ASSESSMENT & PLAN NOTE
The likely etiology of thrombocytopenia is infection. The patients 3 most recent labs are listed below.  Recent Labs     04/02/25  0420 04/03/25  0954 04/04/25  0510   * 126* 145*     Plan  - Will transfuse if platelet count is <10k.

## 2025-04-04 NOTE — ASSESSMENT & PLAN NOTE
[facility]  Plan:  -continue home medication  -monitor H/H

## 2025-04-04 NOTE — PT/OT/SLP PROGRESS
Occupational Therapy   Treatment    Name: Mitch Whittaker  MRN: [id number]  Admitting Diagnosis:  Cellulitis of left lower extremity       Recommendations:     Discharge Recommendations: Moderate Intensity Therapy  Discharge Equipment Recommendations:  to be determined by next level of care  Barriers to discharge:  None    Assessment:     Mitch Whittaker is a 69 y.o. male with a medical diagnosis of Cellulitis of left lower extremity.  He presents with the following performance deficits affecting function are weakness, impaired endurance, impaired self care skills, impaired functional mobility, gait instability, impaired balance, impaired cognition, decreased coordination, decreased upper extremity function, decreased lower extremity function, abnormal tone, impaired coordination, impaired cardiopulmonary response to activity.     Rehab Prognosis:  Fair and Poor; patient would benefit from acute skilled OT services to address these deficits and reach maximum level of function.       Plan:     Patient to be seen 2 x/week to address the above listed problems via self-care/home management, therapeutic activities, therapeutic exercises  Plan of Care Expires: 04/16/25  Plan of Care Reviewed with: patient    Subjective     Chief Complaint: Pain and stiffness everywhere  Patient/Family Comments/goals: go home  Pain/Comfort:  Pain Rating 1: 10/10  Location - Side 1: Bilateral  Location - Orientation 1: generalized    Objective:     Communicated with: Nurse and EPIC prior to session.  Patient found HOB elevated with telemetry, peripheral IV upon OT entry to room.    General Precautions: Standard, aspiration, other (see comments) (Pt refused d/t pain and fatigue)    Orthopedic Precautions:N/A  Braces: N/A  Respiratory Status: Room air     Occupational Performance:     Bed Mobility:    Patient completed Rolling/Turning to Left with  moderate assistance  Patient completed Rolling/Turning to Right with moderate assistance      Functional Mobility/Transfers:  Pt unsafe to transfer at this time      [facility] 6 Click ADL: 9    Treatment & Education:  Pt states pain with all movement. Work on cervical PROM, shoulder mobility, hand squeezes, elbow flexion, and repositioning body to prevent bed sores. Pt resistive to therapy and requires max verbal encouragement.   OT role, plan of care, progression of goals, importance of continued OOB activity, ADL/functional transfer and mobility retraining, call don't fall, safety precautions, fall prevention. Pt educated on sitting in chair for 1 hour during breakfast lunch and dinner in order to change positions, regulate BP and reduce bed sores.   Pt acknowledges and agrees with POC given by OT.      Patient left with bed in chair position with all lines intact, call button in reach, and bed alarm on    GOALS:   Multidisciplinary Problems       Occupational Therapy Goals          Problem: Occupational Therapy    Goal Priority Disciplines Outcome Interventions   Occupational Therapy Goal     OT, PT/OT Progressing    Description: O.T. GOALS TO BE MET 4-16-25  PT WILL TOLERATE 1 SET X 20 REPS B UE ROM EXERCISE  MOD A WITH SUPINE<SIT TRANSFERS  MAX A WITH BSC TRANSFERS  MIN A WITH ROLLING L<>R                             Time Tracking:     OT Date of Treatment: 04/04/25  OT Start Time: 0800  OT Stop Time: 0830  OT Total Time (min): 30 min    Billable Minutes:Therapeutic Activity 15  Therapeutic Exercise 15    OT/ANNIA: ANNAI     Number of ANNIA visits since last OT visit: 2  ZABRINA Mancilla    4/4/2025

## 2025-04-04 NOTE — ASSESSMENT & PLAN NOTE
Patient's most recent potassium results are listed below.   Recent Labs     04/02/25  0420 04/03/25  0954 04/04/25  0510   K 3.3* 3.3* 3.1*     Plan  -Replete potassium per protocol  -Monitor potassium Daily  -Patient's hypokalemia is currently undergoing medical management

## 2025-04-04 NOTE — PROGRESS NOTES
[facility] Medicine  Progress Note    Patient Name: Mitch Whittaker  MRN: [id number]  Patient Class: IP- Inpatient   Admission Date: 4/1/2025  Length of Stay: 3 days  Attending Physician: Carlos Mathew MD  Primary Care Provider: Valery Caal MD        Subjective     Principal Problem:Cellulitis of left lower extremity        HPI:  Mitch Whittaker is a 69 y.o. 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), Diabetic retinopathy, DM (diabetes mellitus), type 2 with complications (1994), Edema, End stage kidney disease, Gallbladder polyp, Heart failure, diastolic, due to HTN, Hemodialysis status, 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 who presented to the ED for further evaluation of worsening left leg pain and swelling which began last night.  Patient reports being bed-bound but suffers from chronic venous stasis and recurrent skin infections.  Patient reported symptoms began acutely with no known alleviating or aggravating factors noted with a associated symptoms including nonproductive cough in addition to those noted above.  He denied  endorsing any recent trauma to the affected area and reported being in his usual state of health prior to onset of symptoms.  All other review of systems negative except as noted above.  Initial workup in the ED revealed patient met SIRS criteria for sepsis with concerns for underlying cellulitis and was initiated on cefepime.  Remaining laboratory workup revealed H/H 9.6/31.8, potassium 2.8, creatinine/GFR 1.9/37, blood glucose 61, magnesium 1.1, troponin 0.152, lactic acid within normal limits, procalcitonin 1.60, chest x-ray negative for acute findings.  Patient admitted to Hospital Medicine inpatient for continued medical management.    PCP: Valery Caal      Overview/Hospital Course:    4/2/25  Patient admitted or LLE cellulitis, continue cefepime  Noted bilateral chronic venous stasis, Wound Care consulted  Reports BLE weakness x 2 months, difficulty ambulating  Replete K and Mg  PT/OT consult    4/3/25  Patient with dysphagia and early satiety, ST consulted  Hx of Tacrolimus induced GI toxicity  Upper GI FL pending  Patient with renal transplant, on tacrolimus and cellcept  Restart tacrolimus, consult Nephrology for assitance in transplant management  Continue cefepime for LLE celulitis  PT/OT with reccs for JOSE, consult SW for SNF placement    4/4/25  NAEON, patient resting in bed, no complaints  Blood cultures NGTD, check procal, possibly de-escalate cefepime 1-2 days  On IDDSI level 5 diet per ST reccs, appreciate assistance, will plan for outpatient GI referral  Stop famotidine, start Protonix, plan to continue on discharge      Review of Systems   All other systems reviewed and are negative.    Objective:     Vital Signs (Most Recent):  Temp: 99.3 °F (37.4 °C) (04/04/25 1129)  Pulse: 97 (04/04/25 1129)  Resp: 16 (04/04/25 1129)  BP: (!) 112/55 (04/04/25 1129)  SpO2: 97 % (04/04/25 1129) Vital Signs (24h Range):  Temp:  [97.8 °F (36.6 °C)-99.5 °F (37.5 °C)] 99.3 °F (37.4 °C)  Pulse:  []  97  Resp:  [16-20] 16  SpO2:  [94 %-98 %] 97 %  BP: (112-150)/(55-69) 112/55     Weight: 103.9 kg (229 lb 0.9 oz)  Body mass index is 31.07 kg/m².    Intake/Output Summary (Last 24 hours) at 4/4/2025 1214  Last data filed at 4/3/2025 2100  Gross per 24 hour   Intake --   Output 200 ml   Net -200 ml         Physical Exam  Constitutional:       General: He is in acute distress.      Appearance: Normal appearance. He is obese. He is ill-appearing.   Cardiovascular:      Rate and Rhythm: Normal rate and regular rhythm.      Heart sounds: No murmur heard.  Pulmonary:      Effort: Pulmonary effort is normal. No respiratory distress.      Breath sounds: Normal breath sounds. No wheezing.   Abdominal:      General: There is no distension.      Palpations: Abdomen is soft.      Tenderness: There is no abdominal tenderness.   Skin:     Findings: Erythema present.   Neurological:      Mental Status: He is alert.               Significant Labs: All pertinent labs within the past 24 hours have been reviewed.    FL Upper GI   Final Result      No significant stenosis or narrowing of the esophagus demonstrate.  Lateral esophagram was unable to be obtained secondary to patient mobility.      Tertiary contractions.      GERD.         Electronically signed by: Salty Moreno   Date:    04/03/2025   Time:    15:48      X-Ray Chest AP Portable   Final Result   No acute cardiopulmonary disease.      Finalized on: 4/1/2025 7:26 PM By:  Raudel Mathew   [facility]# [id number]      2025-04-01 19:28:36.851     [facility]          Recent Lab Results  (Last 5 results in the past 24 hours)        04/04/25  1114   04/04/25  0946   04/04/25  0542   04/04/25  0510   04/03/25  2048        Procalcitonin   2.72  Comment: A concentration < 0.25 ng/mL represents a low risk of bacterial infection.  Procalcitonin may not be accurate among patients with localized   infection, recent trauma or major surgery, immunosuppressed state,   invasive fungal infection, renal  dysfunction. Decisions regarding   initiation or continuation of antibiotic therapy should not be based   solely on procalcitonin levels.             Albumin       1.8         Anion Gap       11         Aniso       Slight         BUN       38         Calcium       8.3         Chloride       101         CO2       20         Creatinine       1.8         eGFR       40  Comment: Estimated GFR calculated using the CKD-EPI creatinine (2021) equation.         Giant Platelets       Present         Glucose       169         Gran # (ANC)       1.3         Hematocrit       26.6         Hemoglobin       7.9         Hypo       Occasional         Lymph %       25.0         Magnesium        1.8         MCH       24.8         MCHC       29.7         MCV       84         Mono %       15.0         MPV       11.8         nRBC       0         Ovalocytes       Occasional         Phosphorus Level       2.9         Platelet Estimate       Clumped         Platelet Count       145         POCT Glucose 150     160     255       Poikilocytosis       Slight         Potassium       3.1         RBC       3.18         RDW       17.2         Segmented Neutrophil %       60.0         Sodium       132         Teardrop Cells       Occasional         WBC       2.14                                Significant Imaging: I have reviewed all pertinent imaging results/findings within the past 24 hours.    FL Upper GI   Final Result      No significant stenosis or narrowing of the esophagus demonstrate.  Lateral esophagram was unable to be obtained secondary to patient mobility.      Tertiary contractions.      GERD.         Electronically signed by: Salty Moreno   Date:    04/03/2025   Time:    15:48      X-Ray Chest AP Portable   Final Result   No acute cardiopulmonary disease.      Finalized on: 4/1/2025 7:26 PM By:  Raudel Mathew   [facility]# [id number]      2025-04-01 19:28:36.851     [facility]              Assessment & Plan  Cellulitis of left lower  extremity  Continue cefepime  Wound care    Chronic venous insufficiency of lower extremity      Sepsis  This patient does have evidence of infective focus  My overall impression is sepsis.  Source: Skin and Soft Tissue (location left leg cellulitis, gout of left knee)  Antibiotics given-   Antibiotics (72h ago, onward)      Start     Stop Route Frequency Ordered    04/02/25 0600  ceFEPIme injection 2 g         -- IV Every 12 hours (non-standard times) 04/01/25 2036          Latest lactate reviewed-  Recent Labs   Lab 04/01/25  2212   LACTATE 0.7     Organ dysfunction indicated by Acute kidney injury    Fluid challenge Ideal Body Weight- The patient's ideal body weight is Ideal body weight: 77.6 kg (171 lb 1.2 oz) which will be used to calculate fluid bolus of 30 ml/kg for treatment of septic shock.      Post- resuscitation assessment No - Post resuscitation assessment not needed     Will Not start Pressors- Levophed for MAP of 65    Source control achieved by: Cefepime    Follow up uric acid level and consider ortho consult for left knee aspiration to fully rule out gout flare.    Weakness of both lower extremities  Patient reported worsening bilateral lower extremity weakness times 2-3 months in which he reported symptoms began acutely after his right leg gave out going to the restroom.  Patient denies endorsing any back pain, experiencing ground level fall/trauma, but does report fecal incontinence when urinating.  Patient has since been bed-bound in his not been seen/evaluated by a physician for these symptoms.  Plan:  -replete electrolytes  -consider obtaining spine imaging for further evaluation  -PT/OT    Hypokalemia  Patient's most recent potassium results are listed below.   Recent Labs     04/02/25  0420 04/03/25  0954 04/04/25  0510   K 3.3* 3.3* 3.1*     Plan  -Replete potassium per protocol  -Monitor potassium Daily  -Patient's hypokalemia is  currently undergoing medical  "management    Hypomagnesemia  Patient has Abnormal Magnesium: hypomagnesemia. Will continue to monitor electrolytes closely. Will replace the affected electrolytes and repeat labs to be done after interventions completed. The patient's magnesium results have been reviewed and are listed below.  Recent Labs   Lab 04/04/25  0510   MG 1.8       Chronic combined systolic and diastolic congestive heart failure  Patient has Combined Systolic and Diastolic heart failure that is Chronic. On presentation their CHF was well compensated. Most recent BNP and echo results are listed below.  No results for input(s): "BNP" in the last 72 hours.  Latest ECHO  Results for orders placed during the hospital encounter of 03/13/25    Echo Saline Bubble? No    Interpretation Summary    Left Ventricle: The left ventricle is normal in size. Mildly increased ventricular mass. Mildly increased wall thickness. There is mild concentric hypertrophy. Normal wall motion. Septal motion is consistent with bundle branch block. There is moderately reduced systolic function with a visually estimated ejection fraction of 35 - 40%. Grade I diastolic dysfunction.    Right Ventricle: The right ventricle is normal in size. Wall thickness is normal. Systolic function is normal.    Left Atrium: Mildly dilated    Aorta: Aortic annulus is mildly dilated measuring 4.01 cm. Ascending aorta is normal measuring 3.69 cm.    Pulmonary Artery: The estimated pulmonary artery systolic pressure is 24 mmHg.    IVC/SVC: Normal venous pressure at 3 mmHg.    Current Heart Failure Medications  metoprolol succinate (TOPROL-XL) 24 hr tablet 25 mg, Daily, Oral  furosemide tablet 40 mg, Daily, Oral    Plan  -Monitor strict I&Os and daily weights.    -Place on telemetry  -Low sodium diet  -Place on fluid restriction of 1.5 L.   -Cardiology has not been consulted  -The patient's volume status is at their baseline  -Continue home medications    Hypertension  Chronic, uncontrolled.  " Latest blood pressure and vitals reviewed-   Temp:  [97.8 °F (36.6 °C)-99.5 °F (37.5 °C)]   Pulse:  []   Resp:  [16-20]   BP: (112-150)/(55-69)   SpO2:  [94 %-98 %] .   Home meds for hypertension were reviewed and noted below.   Hypertension Medications              furosemide (LASIX) 40 MG tablet Take 1 tablet (40 mg total) by mouth once daily.    metoprolol succinate (TOPROL-XL) 25 MG 24 hr tablet Take 1 tablet (25 mg total) by mouth once daily.    sacubitriL-valsartan (ENTRESTO)  mg per tablet ([Paused] since 3/11/2025 12:59 PM) Take 1 tablet by mouth 2 (two) times daily.     While in the hospital, will manage blood pressure as follows; Continue home antihypertensive regimen    Will utilize p.r.n. blood pressure medication only if patient's blood pressure greater than  180/110 and he develops symptoms such as worsening chest pain or shortness of breath.    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  Last A1c reviewed-   Lab Results   Component Value Date    HGBA1C 5.7 (H) 12/17/2024     Most recent fingerstick glucose reviewed-   Recent Labs   Lab 04/03/25  1625 04/03/25  2048 04/04/25  0542 04/04/25  1114   POCTGLUCOSE 222* 255* 160* 150*     Current correctional scale  Low  Titrate as needed  anti-hyperglycemic dose as follows-   Antihyperglycemics (From admission, onward)      Start     Stop Route Frequency Ordered    04/03/25 1245  insulin glargine U-100 (Lantus) pen 10 Units         -- SubQ 2 times daily 04/03/25 1136    04/01/25 2134  insulin aspart U-100 pen 0-5 Units         -- SubQ Before meals & nightly PRN 04/01/25 2035          Plan:  -SSI  -A1c  -Accu-checks  -Hold oral hypoglycemics while patient is in the hospital  -Continue home long-acting insulin at 20% decrease, titrate up as needed  -Hypoglycemic protocol      Anemia, chronic  disease  Anemia is likely due to chronic disease due to Chronic Kidney Disease. Most recent hemoglobin and hematocrit are listed below.  Recent Labs     04/02/25  0420 04/03/25  0954 04/04/25  0510   HGB 9.1* 7.9* 7.9*   HCT 31.4* 27.7* 26.6*     Plan  -Monitor serial CBC: Daily  -Transfuse PRBC if patient becomes hemodynamically unstable, symptomatic or H/H drops below 7/21.  -Patient has not received any PRBC transfusions to date  -Patient's anemia is currently stable    Coronary artery disease of native artery of native heart with stable angina pectoris  Patient with known CAD, which is controlled.  EKG negative for signs of acute ischemia.  Troponin elevated at 0.15 to likely secondary to demand ischemia in setting of sepsis and CHF. Will continue  home medications  and monitor for S/Sx of angina/ACS. Continue to monitor on telemetry.    Chronic immunosuppression with Prograf, MMF and prednisone  Patient currently on Prograf, CellCept, and mycophenolate.  Plan:  -continue Prograf and CellCept  -holding mycophenolate    Hyperlipidemia associated with type 2 diabetes mellitus  Patient is chronically on statin.will continue for now. Last Lipid Panel:   Lab Results   Component Value Date    CHOL 175 12/17/2024    HDL 50 12/17/2024    LDLCALC 98.2 12/17/2024    TRIG 134 12/17/2024    CHOLHDL 28.6 12/17/2024     Plan:  -Continue home medication  -low fat/low calorie diet    History of DVT (deep vein thrombosis)  Currently on Eliquis outpatient.  Plan:  -continue home medication  -monitor H/H    Obstructive sleep apnea  Currently on CPAP outpatient.  Plan:  -continue CPAP q.h.s.    Obesity (BMI 30-39.9)  Body mass index is 31.07 kg/m². Morbid obesity complicates all aspects of disease management from diagnostic modalities to treatment. Weight loss encouraged and health benefits explained to patient.     Thrombocytopenia  The likely etiology of thrombocytopenia is infection. The patients 3 most recent labs are listed  below.  Recent Labs     04/02/25  0420 04/03/25  0954 04/04/25  0510   * 126* 145*     Plan  - Will transfuse if platelet count is <10k.    VTE Risk Mitigation (From admission, onward)           Ordered     apixaban tablet 5 mg  2 times daily         04/03/25 1141     Reason for No Pharmacological VTE Prophylaxis  Once        Question:  Reasons:  Answer:  Already adequately anticoagulated on oral Anticoagulants    04/01/25 2035     IP VTE HIGH RISK PATIENT  Once         04/01/25 2035     Place sequential compression device  Until discontinued         04/01/25 2035                    Discharge Planning   GRETEL:      Code Status: Full Code   Medical Readiness for Discharge Date:   Discharge Plan A: Home Health, Home with family                Please place Justification for DME        Carlos Mathew MD  Department of Hospital Medicine   [place] - Telemetry ([facility])

## 2025-04-04 NOTE — ASSESSMENT & PLAN NOTE
Chronic, uncontrolled.  Latest blood pressure and vitals reviewed-   Temp:  [97.8 °F (36.6 °C)-99.5 °F (37.5 °C)]   Pulse:  []   Resp:  [16-20]   BP: (112-150)/(55-69)   SpO2:  [94 %-98 %] .   Home meds for hypertension were reviewed and noted below.   Hypertension Medications              furosemide (LASIX) 40 MG tablet Take 1 tablet (40 mg total) by mouth once daily.    metoprolol succinate (TOPROL-XL) 25 MG 24 hr tablet Take 1 tablet (25 mg total) by mouth once daily.    sacubitriL-valsartan (ENTRESTO)  mg per tablet ([Paused] since 3/11/2025 12:59 PM) Take 1 tablet by mouth 2 (two) times daily.     While in the hospital, will manage blood pressure as follows; Continue home antihypertensive regimen    Will utilize p.r.n. blood pressure medication only if patient's blood pressure greater than  180/110 and he develops symptoms such as worsening chest pain or shortness of breath.

## 2025-04-04 NOTE — SUBJECTIVE & OBJECTIVE
Review of Systems   All other systems reviewed and are negative.    Objective:     Vital Signs (Most Recent):  Temp: 99.3 °F (37.4 °C) (04/04/25 1129)  Pulse: 97 (04/04/25 1129)  Resp: 16 (04/04/25 1129)  BP: (!) 112/55 (04/04/25 1129)  SpO2: 97 % (04/04/25 1129) Vital Signs (24h Range):  Temp:  [97.8 °F (36.6 °C)-99.5 °F (37.5 °C)] 99.3 °F (37.4 °C)  Pulse:  [] 97  Resp:  [16-20] 16  SpO2:  [94 %-98 %] 97 %  BP: (112-150)/(55-69) 112/55     Weight: 103.9 kg (229 lb 0.9 oz)  Body mass index is 31.07 kg/m².    Intake/Output Summary (Last 24 hours) at 4/4/2025 1214  Last data filed at 4/3/2025 2100  Gross per 24 hour   Intake --   Output 200 ml   Net -200 ml         Physical Exam  Constitutional:       General: He is in acute distress.      Appearance: Normal appearance. He is obese. He is ill-appearing.   Cardiovascular:      Rate and Rhythm: Normal rate and regular rhythm.      Heart sounds: No murmur heard.  Pulmonary:      Effort: Pulmonary effort is normal. No respiratory distress.      Breath sounds: Normal breath sounds. No wheezing.   Abdominal:      General: There is no distension.      Palpations: Abdomen is soft.      Tenderness: There is no abdominal tenderness.   Skin:     Findings: Erythema present.   Neurological:      Mental Status: He is alert.               Significant Labs: All pertinent labs within the past 24 hours have been reviewed.    FL Upper GI   Final Result      No significant stenosis or narrowing of the esophagus demonstrate.  Lateral esophagram was unable to be obtained secondary to patient mobility.      Tertiary contractions.      GERD.         Electronically signed by: Salty Moreno   Date:    04/03/2025   Time:    15:48      X-Ray Chest AP Portable   Final Result   No acute cardiopulmonary disease.      Finalized on: 4/1/2025 7:26 PM By:  Raudel Mathew   [facility]# [id number]      2025-04-01 19:28:36.851     [facility]          Recent Lab Results  (Last 5 results in the past 24 hours)         04/04/25  1114   04/04/25  0946   04/04/25  0542   04/04/25  0510   04/03/25  2048        Procalcitonin   2.72  Comment: A concentration < 0.25 ng/mL represents a low risk of bacterial infection.  Procalcitonin may not be accurate among patients with localized   infection, recent trauma or major surgery, immunosuppressed state,   invasive fungal infection, renal dysfunction. Decisions regarding   initiation or continuation of antibiotic therapy should not be based   solely on procalcitonin levels.             Albumin       1.8         Anion Gap       11         Aniso       Slight         BUN       38         Calcium       8.3         Chloride       101         CO2       20         Creatinine       1.8         eGFR       40  Comment: Estimated GFR calculated using the CKD-EPI creatinine (2021) equation.         Giant Platelets       Present         Glucose       169         Gran # (ANC)       1.3         Hematocrit       26.6         Hemoglobin       7.9         Hypo       Occasional         Lymph %       25.0         Magnesium        1.8         MCH       24.8         MCHC       29.7         MCV       84         Mono %       15.0         MPV       11.8         nRBC       0         Ovalocytes       Occasional         Phosphorus Level       2.9         Platelet Estimate       Clumped         Platelet Count       145         POCT Glucose 150     160     255       Poikilocytosis       Slight         Potassium       3.1         RBC       3.18         RDW       17.2         Segmented Neutrophil %       60.0         Sodium       132         Teardrop Cells       Occasional         WBC       2.14                                Significant Imaging: I have reviewed all pertinent imaging results/findings within the past 24 hours.    FL Upper GI   Final Result      No significant stenosis or narrowing of the esophagus demonstrate.  Lateral esophagram was unable to be obtained secondary to patient mobility.      Tertiary  contractions.      GERD.         Electronically signed by: Salty Moreno   Date:    04/03/2025   Time:    15:48      X-Ray Chest AP Portable   Final Result   No acute cardiopulmonary disease.      Finalized on: 4/1/2025 7:26 PM By:  Raudel Mathew   [facility]# [id number]      2025-04-01 19:28:36.851     [facility]

## 2025-04-04 NOTE — ASSESSMENT & PLAN NOTE
Patient's FSGs are uncontrolled due to hypoglycemia on current medication regimen.  Last A1c reviewed-   Lab Results   Component Value Date    HGBA1C 5.7 (H) 12/17/2024     Most recent fingerstick glucose reviewed-   Recent Labs   Lab 04/03/25  1625 04/03/25  2048 04/04/25  0542 04/04/25  1114   POCTGLUCOSE 222* 255* 160* 150*     Current correctional scale  Low  Titrate as needed anti-hyperglycemic dose as follows-   Antihyperglycemics (From admission, onward)      Start     Stop Route Frequency Ordered    04/03/25 1245  insulin glargine U-100 (Lantus) pen 10 Units         -- SubQ 2 times daily 04/03/25 1136    04/01/25 2134  insulin aspart U-100 pen 0-5 Units         -- SubQ Before meals & nightly PRN 04/01/25 2035          Plan:  -SSI  -A1c  -Accu-checks  -Hold oral hypoglycemics while patient is in the hospital  -Continue home long-acting insulin at 20% decrease, titrate up as needed  -Hypoglycemic protocol

## 2025-04-04 NOTE — ASSESSMENT & PLAN NOTE
Anemia is likely due to chronic disease due to Chronic Kidney Disease. Most recent hemoglobin and hematocrit are listed below.  Recent Labs     04/02/25  0420 04/03/25  0954 04/04/25  0510   HGB 9.1* 7.9* 7.9*   HCT 31.4* 27.7* 26.6*     Plan  -Monitor serial CBC: Daily  -Transfuse PRBC if patient becomes hemodynamically unstable, symptomatic or H/H drops below 7/21.  -Patient has not received any PRBC transfusions to date  -Patient's anemia is currently stable

## 2025-04-04 NOTE — PT/OT/SLP PROGRESS
Speech Language Pathology Treatment    Patient Name:  Mitch Whittaker   MRN:  [id number]  Admitting Diagnosis: Cellulitis of left lower extremity    Recommendations:                 General Recommendations:   OP GI f/u--see Upper GI fl during acute admission; no further ST intervention indicated at this time  Diet recommendations:  Minced & Moist Diet - IDDSI Level 5, Liquid Diet Level: Thin liquids - IDDSI Level 0   Aspiration Precautions:  GERD/behavioral reflux precautions, Frequent oral care, HOB to 90 degrees, Monitor for s/s of aspiration, Small bites/sips, and Standard aspiration precautions   General Precautions: Standard, aspiration, other (see comments) (behavioral reflux precautions)  Communication strategies:  provide increased time to answer    Assessment:     Mitch Whittaker is a 69 y.o. admitted to [facility] acute with dx cellulitis of Left LE, sepsis, hypokalemia and hypomagnesemia.  He presents with adequate REBEKA and ability to communicate acute needs, although cognitive deficits noted.  Pt reported worsening dysphagia symptoms with solids and pill-form medications since 2/2025, including globus sensation and early satiety, which is impacting his nutritional intake. ST consulted overnight as pt reported increased difficulty swallowing solids and saliva, with oral excretion of secretions into cup throughout the day.  Improved secretion management this a.m. with functional vocal quality and respiratory status (RA).  No overt s/s of aspiration present during bedside CSE; however, persistent c/o globus sensation>regular solids, requiring liquid wash/rinse to assist in transit and frequent belching noted throughout assessment.  He is recommended for IDDSI 5-minced/moist solids and IDDSI 0-thin liquids, as well as comprehensive swallow evaluation of esophagus, including Upper GI Fl or Esophagram.  OP GI also warranted given pt ongoing dysphagia complaints impacting nutritional intake. ST will f/u diet.      4/4/25--Pt consuming IDDSI 5-minced/moist solids and IDDSI 0-thin liquids without overt s/s of dysphagia.  Pt reported improvement in efficiency with solid diet modification. Educated on behavioral reflux precautions. Recommended OP GI referral post-acute to further address chronic dysphagia complaints and esophagram findings (see below).  No further ST intervention indicated at this time. Please re-consult if need.    EXAMINATION:   Esophagram.     CLINICAL HISTORY:  esophageal/GI dysfunction;     TECHNIQUE:  Contrast material: Barium and effervescent granules     Air kerma: 36 mGy     COMPARISON:  None     FINDINGS:  Limited study.     Preliminary radiograph: Unremarkable     Swallowing: No aspiration.     Esophagus: Tertiary contractions throughout the exam.  Mild reflux from distal to proximal esophagus.  Questionable upper esophageal narrowing difficult to more definitively evaluate secondary to patient's limited mobility.  No hiatal hernia demonstrated.     Impression:     No significant stenosis or narrowing of the esophagus demonstrate.  Lateral esophagram was unable to be obtained secondary to patient mobility.     Tertiary contractions.     GERD.        Electronically signed by:Salty Moreno  Date:                                            04/03/2025  Time:                                           15:48    Subjective     Pt seen bedside for ST. Awake/alert and communicative. Watching TV. No family present.  Patient goals: to improve mobility, continue current PO diet     Pain/Comfort:  Pain Rating 1: 0/10  Pain Rating Post-Intervention 1: 0/10  Pain Rating 2: 0/10  Pain Rating Post-Intervention 2: 0/10    Respiratory Status: Room air    Objective:     Has the patient been evaluated by SLP for swallowing?   Yes  Keep patient NPO? No   Current Respiratory Status: RA       Pt consuming IDDSI 5-minced/moist solids and IDDSI 0-thin liquids without overt s/s of dysphagia.  Requires assist with meals. Pt  reported improvement in efficiency with solid diet modification and prefers to remain on minced/moist diet. Educated on behavioral reflux precautions and recommended OP GI.     Goals:   Multidisciplinary Problems       SLP Goals       Not on file              Multidisciplinary Problems (Resolved)          Problem: SLP    Goal Priority Disciplines Outcome   SLP Goal   (Resolved)     SLP Met   Description: 1.  Pt will consume the least restrictive PO diet without overt s/s of aspiration.                       Plan:     Patient to be seen:  1 x/week, 2 x/week   Plan of Care expires:   (acute goals met)  Plan of Care reviewed with:  patient   SLP Follow-Up:  No       Discharge recommendations:  Moderate Intensity Therapy   Barriers to Discharge:  None    Time Tracking:     SLP Treatment Date:   04/04/25  Speech Start Time:  0930  Speech Stop Time:  0945     Speech Total Time (min):  15 min    Billable Minutes: Treatment Swallowing Dysfunction 15 minutes    04/04/2025

## 2025-04-04 NOTE — PROGRESS NOTES
[facility])  Nephrology  Progress Note    Patient Name: Mitch Whittaker  MRN: [id number]  Admission Date: 4/1/2025  Hospital Length of Stay: 3 days  Attending Provider: Carlos Mathew MD   Primary Care Physician: Valery Caal MD  Principal Problem:Cellulitis of left lower extremity    Consults  Subjective:     The patient is a 69 y.o. male with a hx of ESRD who underwent living related kidney transplant about 10 years ago.  He has CKD stage 3 and is followed by Dr. Gonsalez of [facility].  History of CMV viremia with titer positive as recently as April of 2023 per outpatient notes from Dr. Gonsalez.  He has a multitude of chronic other medical conditions including CHF, type 2 diabetes, obesity etcetera.  He takes tacrolimus, prednisone, and mycophenolate for his immunosuppression.  Of note his mycophenolate was held for a period of time earlier in the year in the setting of his active C diff infection.  During his recent hospital stay this was assumed around March 20th.     Patient was most recently in the hospital about 2 weeks ago.  During the hospital stay he was treated with IV diuresis for acute on chronic heart failure.  He was also on oral vancomycin for recent C diff infection.  He was discharged March 22nd with a creatinine around 1.7.     Patient now presents yet again to the hospital April 1st and is admitted to the hospital by hospital medicine with lower extremity cellulitis.  We are consulted to assist in the care of this patient with renal transplant and CKD.  This morning his potassium was 3.3, BUN 35 and creatinine 1.9.    Interval History:  Patient was seen in his hospital room.  In bed resting comfortably.  No acute distress noted.  No new complaints from overnight.    Review of systems: No shortness of breath or chest discomfort.  No fevers or chills no nausea vomiting or diarrhea.  He does have some mild swelling in his feet bilaterally.    Review of patient's  allergies indicates:   Allergen Reactions    Lisinopril Other (See Comments)     Other reaction(s):  cough    Actos  [pioglitazone] Other (See Comments)     Other reaction(s): CHF    Metformin Other (See Comments)     Other reaction(s): renal insuff  Other reaction(s): CHF     Current Facility-Administered Medications   Medication Frequency    acetaminophen suppository 650 mg Q6H PRN    acetaminophen tablet 650 mg Q6H PRN    allopurinol split tablet 50 mg Daily    aluminum-magnesium hydroxide-simethicone 200-200-20 mg/5 mL suspension 30 mL QID PRN    apixaban tablet 5 mg BID    aspirin EC tablet 81 mg Daily    calcitRIOL capsule 0.25 mcg Daily    ceFEPIme injection 2 g Q12H    dextrose 50% injection 12.5 g PRN    dextrose 50% injection 25 g PRN    famotidine tablet 20 mg QHS    ferrous sulfate tablet 1 each Daily    furosemide tablet 40 mg Daily    glucagon (human recombinant) injection 1 mg PRN    glucose chewable tablet 16 g PRN    glucose chewable tablet 24 g PRN    HYDROcodone-acetaminophen 5-325 mg per tablet 1 tablet Q6H PRN    insulin aspart U-100 pen 0-5 Units QID (AC + HS) PRN    insulin glargine U-100 (Lantus) pen 10 Units BID    magnesium sulfate 2g in water 50mL IVPB (premix) Once    melatonin tablet 6 mg Nightly PRN    metoprolol succinate (TOPROL-XL) 24 hr tablet 25 mg Daily    morphine injection 2 mg Q4H PRN    naloxone 0.4 mg/mL injection 0.02 mg PRN    ondansetron injection 4 mg Q8H PRN    polyethylene glycol packet 17 g Daily PRN    potassium chloride SA CR tablet 20 mEq TID    predniSONE tablet 5 mg Daily    promethazine tablet 25 mg Q6H PRN    simethicone chewable tablet 80 mg QID PRN    sodium bicarbonate tablet 650 mg BID    sodium chloride 0.9% flush 3 mL Q12H PRN    tacrolimus capsule 4 mg BID       Objective:     Vital Signs (Most Recent):  Temp: 98.7 °F (37.1 °C) (04/04/25 0835)  Pulse: 105 (04/04/25 1000)  Resp: 18 (04/04/25 0916)  BP: 131/61 (04/04/25 0917)  SpO2: 98 % (04/04/25 0835)  Vital Signs (24h Range):  Temp:  [97.8 °F (36.6 °C)-99.5 °F (37.5 °C)] 98.7 °F (37.1 °C)  Pulse:  [] 105  Resp:  [18-20] 18  SpO2:  [94 %-98 %] 98 %  BP: (119-150)/(58-69) 131/61     Weight: 103.9 kg (229 lb 0.9 oz) (04/02/25 2354)  Body mass index is 31.07 kg/m².  Body surface area is 2.3 meters squared.    I/O last 3 completed shifts:  In: -   Out: 200 [Urine:200]    Physical Exam  Constitutional:       Appearance: Normal appearance.   HENT:      Head: Normocephalic and atraumatic.   Eyes:      General: No scleral icterus.     Extraocular Movements: Extraocular movements intact.      Pupils: Pupils are equal, round, and reactive to light.   Cardiovascular:      Rate and Rhythm: Normal rate and regular rhythm.   Pulmonary:      Effort: Pulmonary effort is normal.      Breath sounds: No stridor.   Musculoskeletal:      Right lower leg: Edema present.      Left lower leg: Edema present.   Skin:     General: Skin is warm and dry.   Neurological:      General: No focal deficit present.      Mental Status: He is alert and oriented to person, place, and time.   Psychiatric:         Mood and Affect: Mood normal.         Behavior: Behavior normal.         Significant Labs:sureBMP:   Recent Labs   Lab 04/04/25  0510   *   K 3.1*      CO2 20*   BUN 38*   CREATININE 1.8*   CALCIUM 8.3*   MG 1.8     CMP:   Recent Labs   Lab 04/01/25  1804 04/01/25  2334 04/04/25  0510   CALCIUM 8.1*   < > 8.3*   ALBUMIN 2.3*  --  1.8*      < > 132*   K 2.8*   < > 3.1*   CO2 27   < > 20*      < > 101   BUN 27*   < > 38*   CREATININE 1.9*   < > 1.8*   ALKPHOS 96  --   --    ALT 14  --   --    AST 19  --   --    BILITOT 1.1*  --   --     < > = values in this interval not displayed.     All labs within the past 24 hours have been reviewed.    Significant Imaging:  Labs: Reviewed      Assessment/Plan:     Active Diagnoses:    Diagnosis Date Noted POA    PRINCIPAL PROBLEM:  Cellulitis of left lower extremity [L03.116]  04/02/2025 Yes    Thrombocytopenia [D69.6] 04/03/2025 No    Sepsis [A41.9] 04/02/2025 Yes    Hypokalemia [E87.6] 04/02/2025 Yes    Hypomagnesemia [E83.42] 04/02/2025 Yes    Hypertension [I10] 04/02/2025 Yes     Chronic    Obesity (BMI 30-39.9) [E66.9] 04/02/2025 Yes     Chronic    History of DVT (deep vein thrombosis) [Z86.718] 04/02/2025 Not Applicable     Chronic    Weakness of both lower extremities [R29.898] 04/02/2025 Yes     Chronic    Anemia, chronic disease [D63.8] 03/01/2025 Yes     Chronic    Hyperlipidemia associated with type 2 diabetes mellitus [E11.69, E78.5] 12/13/2024 Yes     Chronic    Chronic venous insufficiency of lower extremity [I87.2]  Yes    Chronic combined systolic and diastolic congestive heart failure [I50.42] 03/15/2019 Yes     Chronic    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, with long-term current use of insulin [E11.3553, Z79.4] 03/27/2017 Not Applicable     Chronic    Chronic immunosuppression with Prograf, MMF and prednisone [Z29.89] 06/18/2015 Not Applicable     Chronic    Coronary artery disease of native artery of native heart with stable angina pectoris [I25.118]  Yes     Chronic    Obstructive sleep apnea [G47.33] 06/13/2013 Yes     Chronic      Problems Resolved During this Admission:       Assessment and plan:    1. Kidney transplant status: In around living related donor transplant in 2015.  Creatinine is slightly higher than his usual baseline, running around 1.8 since admission.  Baseline creatinine is usually between about 1.3 and 1.5.  Will continue to monitor.    2. Immunosuppression: Currently on 4 mg twice daily of Prograf.  Current level is pending.  Will continue to monitor.    3. Cellulitis:  Appears to be improving.  Defer to primary service for management.    4. Hypokalemia:  Potassium is 3.1.  Would continue to replace per protocol.      Thank you for your consult.     Noel Jimenes MD  Nephrology  [facility] - Telemetry  ([facility])

## 2025-04-04 NOTE — ASSESSMENT & PLAN NOTE
Patient has Abnormal Magnesium: hypomagnesemia. Will continue to monitor electrolytes closely. Will replace the affected electrolytes and repeat labs to be done after interventions completed. The patient's magnesium results have been reviewed and are listed below.  Recent Labs   Lab 04/04/25  0510   MG 1.8

## 2025-04-04 NOTE — PLAN OF CARE
Pt reluctant to agree to Tx. Pt requires max encouragement for participation. Pt states pain with ANY BM, PROM, AROM, or ADL

## 2025-04-05 LAB
ABSOLUTE NEUTROPHIL MANUAL (OHS): 0.7 K/UL
ALBUMIN SERPL BCP-MCNC: 1.6 G/DL (ref 3.5–5.2)
ANION GAP (OHS): 9 MMOL/L (ref 8–16)
ANISOCYTOSIS BLD QL SMEAR: SLIGHT
BASOPHILS NFR BLD MANUAL: 1 %
BUN SERPL-MCNC: 42 MG/DL (ref 8–23)
CALCIUM SERPL-MCNC: 8.7 MG/DL (ref 8.7–10.5)
CHLORIDE SERPL-SCNC: 102 MMOL/L (ref 95–110)
CO2 SERPL-SCNC: 23 MMOL/L (ref 23–29)
CREAT SERPL-MCNC: 2.1 MG/DL (ref 0.5–1.4)
EOSINOPHIL NFR BLD MANUAL: 2 % (ref 0–8)
ERYTHROCYTE [DISTWIDTH] IN BLOOD BY AUTOMATED COUNT: 17.3 % (ref 11.5–14.5)
GFR SERPLBLD CREATININE-BSD FMLA CKD-EPI: 33 ML/MIN/1.73/M2
GLUCOSE SERPL-MCNC: 155 MG/DL (ref 70–110)
HCT VFR BLD AUTO: 26.7 % (ref 40–54)
HGB BLD-MCNC: 7.9 GM/DL (ref 14–18)
HYPOCHROMIA BLD QL SMEAR: ABNORMAL
LYMPHOCYTES NFR BLD MANUAL: 28 % (ref 18–48)
MCH RBC QN AUTO: 24.6 PG (ref 27–31)
MCHC RBC AUTO-ENTMCNC: 29.6 G/DL (ref 32–36)
MCV RBC AUTO: 83 FL (ref 82–98)
MONOCYTES NFR BLD MANUAL: 21 % (ref 4–15)
NEUTROPHILS NFR BLD MANUAL: 45 % (ref 38–73)
NEUTS BAND NFR BLD MANUAL: 3 %
NUCLEATED RBC (/100WBC) (OHS): 0 /100 WBC
OVALOCYTES BLD QL SMEAR: ABNORMAL
PHOSPHATE SERPL-MCNC: 2.2 MG/DL (ref 2.7–4.5)
PLATELET # BLD AUTO: 153 K/UL (ref 150–450)
PLATELET BLD QL SMEAR: ABNORMAL
PMV BLD AUTO: 11.6 FL (ref 9.2–12.9)
POCT GLUCOSE: 127 MG/DL (ref 70–110)
POCT GLUCOSE: 146 MG/DL (ref 70–110)
POCT GLUCOSE: 159 MG/DL (ref 70–110)
POCT GLUCOSE: 167 MG/DL (ref 70–110)
POCT GLUCOSE: 251 MG/DL (ref 70–110)
POIKILOCYTOSIS BLD QL SMEAR: SLIGHT
POTASSIUM SERPL-SCNC: 3.5 MMOL/L (ref 3.5–5.1)
RBC # BLD AUTO: 3.21 M/UL (ref 4.6–6.2)
SODIUM SERPL-SCNC: 134 MMOL/L (ref 136–145)
WBC # BLD AUTO: 1.38 K/UL (ref 3.9–12.7)

## 2025-04-05 PROCEDURE — 99233 SBSQ HOSP IP/OBS HIGH 50: CPT | Mod: ,,, | Performed by: INTERNAL MEDICINE

## 2025-04-05 PROCEDURE — 27000207 HC ISOLATION

## 2025-04-05 PROCEDURE — 97530 THERAPEUTIC ACTIVITIES: CPT | Mod: CQ

## 2025-04-05 PROCEDURE — 85025 COMPLETE CBC W/AUTO DIFF WBC: CPT | Performed by: HOSPITALIST

## 2025-04-05 PROCEDURE — 21400001 HC TELEMETRY ROOM

## 2025-04-05 PROCEDURE — 63600175 PHARM REV CODE 636 W HCPCS: Performed by: NURSE PRACTITIONER

## 2025-04-05 PROCEDURE — 36415 COLL VENOUS BLD VENIPUNCTURE: CPT | Performed by: HOSPITALIST

## 2025-04-05 PROCEDURE — 25000003 PHARM REV CODE 250: Performed by: HOSPITALIST

## 2025-04-05 PROCEDURE — 25000003 PHARM REV CODE 250: Performed by: NURSE PRACTITIONER

## 2025-04-05 PROCEDURE — 80069 RENAL FUNCTION PANEL: CPT | Performed by: HOSPITALIST

## 2025-04-05 PROCEDURE — 63600175 PHARM REV CODE 636 W HCPCS: Performed by: HOSPITALIST

## 2025-04-05 RX ORDER — CEPHALEXIN 500 MG/1
500 CAPSULE ORAL EVERY 12 HOURS
Status: DISCONTINUED | OUTPATIENT
Start: 2025-04-05 | End: 2025-04-05

## 2025-04-05 RX ORDER — CEPHALEXIN 500 MG/1
500 CAPSULE ORAL EVERY 8 HOURS
Status: DISCONTINUED | OUTPATIENT
Start: 2025-04-05 | End: 2025-04-10

## 2025-04-05 RX ADMIN — ASPIRIN 81 MG: 81 TABLET, COATED ORAL at 08:04

## 2025-04-05 RX ADMIN — POTASSIUM CHLORIDE 20 MEQ: 1500 TABLET, EXTENDED RELEASE ORAL at 05:04

## 2025-04-05 RX ADMIN — HYDROCODONE BITARTRATE AND ACETAMINOPHEN 1 TABLET: 5; 325 TABLET ORAL at 08:04

## 2025-04-05 RX ADMIN — FERROUS SULFATE TAB 325 MG (65 MG ELEMENTAL FE) 1 EACH: 325 (65 FE) TAB at 08:04

## 2025-04-05 RX ADMIN — APIXABAN 5 MG: 2.5 TABLET, FILM COATED ORAL at 08:04

## 2025-04-05 RX ADMIN — PANTOPRAZOLE SODIUM 40 MG: 40 TABLET, DELAYED RELEASE ORAL at 08:04

## 2025-04-05 RX ADMIN — INSULIN ASPART 1 UNITS: 100 INJECTION, SOLUTION INTRAVENOUS; SUBCUTANEOUS at 10:04

## 2025-04-05 RX ADMIN — SODIUM BICARBONATE 650 MG: 650 TABLET ORAL at 08:04

## 2025-04-05 RX ADMIN — SODIUM BICARBONATE 650 MG: 650 TABLET ORAL at 10:04

## 2025-04-05 RX ADMIN — POTASSIUM CHLORIDE 20 MEQ: 1500 TABLET, EXTENDED RELEASE ORAL at 08:04

## 2025-04-05 RX ADMIN — METOPROLOL SUCCINATE 25 MG: 25 TABLET, EXTENDED RELEASE ORAL at 08:04

## 2025-04-05 RX ADMIN — FUROSEMIDE 40 MG: 40 TABLET ORAL at 08:04

## 2025-04-05 RX ADMIN — CEPHALEXIN 500 MG: 500 CAPSULE ORAL at 10:04

## 2025-04-05 RX ADMIN — TACROLIMUS 4 MG: 1 CAPSULE ORAL at 08:04

## 2025-04-05 RX ADMIN — TACROLIMUS 4 MG: 1 CAPSULE ORAL at 05:04

## 2025-04-05 RX ADMIN — INSULIN GLARGINE 10 UNITS: 100 INJECTION, SOLUTION SUBCUTANEOUS at 08:04

## 2025-04-05 RX ADMIN — CEFEPIME 2 G: 2 INJECTION, POWDER, FOR SOLUTION INTRAVENOUS at 05:04

## 2025-04-05 RX ADMIN — ALLOPURINOL 50 MG: 300 TABLET ORAL at 08:04

## 2025-04-05 RX ADMIN — APIXABAN 5 MG: 2.5 TABLET, FILM COATED ORAL at 10:04

## 2025-04-05 RX ADMIN — POTASSIUM CHLORIDE 20 MEQ: 1500 TABLET, EXTENDED RELEASE ORAL at 10:04

## 2025-04-05 RX ADMIN — PREDNISONE 5 MG: 5 TABLET ORAL at 08:04

## 2025-04-05 RX ADMIN — CALCITRIOL CAPSULES 0.25 MCG 0.25 MCG: 0.25 CAPSULE ORAL at 08:04

## 2025-04-05 RX ADMIN — INSULIN GLARGINE 10 UNITS: 100 INJECTION, SOLUTION SUBCUTANEOUS at 10:04

## 2025-04-05 NOTE — PLAN OF CARE
POC reviewed w/ patient. Pt verbalizes understanding of plan  Chart/Orders reviewed  All safety precautions in place; No falls this shift  Pt resting in bed  Pain controlled  Continuous rounding c bed in lowest position, side rails up, call light in reach  Will continue to monitor until the end of shift  Problem: Adult Inpatient Plan of Care  Goal: Plan of Care Review  Outcome: Progressing  Flowsheets (Taken 4/5/2025 0405)  Plan of Care Reviewed With: patient  Goal: Patient-Specific Goal (Individualized)  Outcome: Progressing  Flowsheets (Taken 4/5/2025 0405)  Individualized Care Needs: none  Anxieties, Fears or Concerns: none  Patient/Family-Specific Goals (Include Timeframe): none  Goal: Absence of Hospital-Acquired Illness or Injury  Outcome: Progressing  Goal: Optimal Comfort and Wellbeing  Outcome: Progressing  Goal: Readiness for Transition of Care  Outcome: Progressing

## 2025-04-05 NOTE — PROGRESS NOTES
[facility])  Nephrology  Progress Note    Patient Name: Mitch Whittaker  MRN: [id number]  Admission Date: 4/1/2025  Hospital Length of Stay: 4 days  Attending Provider: Carlos Mathew MD   Primary Care Physician: Valery Caal MD  Principal Problem:Cellulitis of left lower extremity    Consults  Subjective:     The patient is a 69 y.o. male with a hx of ESRD who underwent living related kidney transplant about 10 years ago.  He has CKD stage 3 and is followed by Dr. Gonsalez of [facility].  History of CMV viremia with titer positive as recently as April of 2023 per outpatient notes from Dr. Gonsalez.  He has a multitude of chronic other medical conditions including CHF, type 2 diabetes, obesity etcetera.  He takes tacrolimus, prednisone, and mycophenolate for his immunosuppression.  Of note his mycophenolate was held for a period of time earlier in the year in the setting of his active C diff infection.  During his recent hospital stay this was assumed around March 20th.     Patient was most recently in the hospital about 2 weeks ago.  During the hospital stay he was treated with IV diuresis for acute on chronic heart failure.  He was also on oral vancomycin for recent C diff infection.  He was discharged March 22nd with a creatinine around 1.7.     Patient now presents yet again to the hospital April 1st and is admitted to the hospital by hospital medicine with lower extremity cellulitis.  We are consulted to assist in the care of this patient with renal transplant and CKD.  This morning his potassium was 3.3, BUN 35 and creatinine 1.9.    04/04/2025:  Patient was seen in his hospital room.  In bed resting comfortably.  No acute distress noted.  No new complaints from overnight.    Review of systems: No shortness of breath or chest discomfort.  No fevers or chills no nausea vomiting or diarrhea.  He does have some mild swelling in his feet bilaterally.    Review of patient's allergies  indicates:   Allergen Reactions    Lisinopril Other (See Comments)     Other reaction(s):  cough    Actos  [pioglitazone] Other (See Comments)     Other reaction(s): CHF    Metformin Other (See Comments)     Other reaction(s): renal insuff  Other reaction(s): CHF     Current Facility-Administered Medications   Medication Frequency    acetaminophen suppository 650 mg Q6H PRN    acetaminophen tablet 650 mg Q6H PRN    allopurinol split tablet 50 mg Daily    aluminum-magnesium hydroxide-simethicone 200-200-20 mg/5 mL suspension 30 mL QID PRN    apixaban tablet 5 mg BID    aspirin EC tablet 81 mg Daily    calcitRIOL capsule 0.25 mcg Daily    ceFEPIme injection 2 g Q12H    dextrose 50% injection 12.5 g PRN    dextrose 50% injection 25 g PRN    ferrous sulfate tablet 1 each Daily    furosemide tablet 40 mg Daily    glucagon (human recombinant) injection 1 mg PRN    glucose chewable tablet 16 g PRN    glucose chewable tablet 24 g PRN    HYDROcodone-acetaminophen 5-325 mg per tablet 1 tablet Q6H PRN    insulin aspart U-100 pen 0-5 Units QID (AC + HS) PRN    insulin glargine U-100 (Lantus) pen 10 Units BID    melatonin tablet 6 mg Nightly PRN    metoprolol succinate (TOPROL-XL) 24 hr tablet 25 mg Daily    morphine injection 2 mg Q4H PRN    naloxone 0.4 mg/mL injection 0.02 mg PRN    ondansetron injection 4 mg Q8H PRN    pantoprazole EC tablet 40 mg Daily    polyethylene glycol packet 17 g Daily PRN    potassium chloride SA CR tablet 20 mEq TID    predniSONE tablet 5 mg Daily    promethazine tablet 25 mg Q6H PRN    simethicone chewable tablet 80 mg QID PRN    sodium bicarbonate tablet 650 mg BID    sodium chloride 0.9% flush 3 mL Q12H PRN    tacrolimus capsule 4 mg BID       Objective:     Vital Signs (Most Recent):  Temp: 98.3 °F (36.8 °C) (04/05/25 0738)  Pulse: 105 (04/05/25 0927)  Resp: 18 (04/05/25 0833)  BP: (!) 157/78 (04/05/25 0738)  SpO2: (!) 94 % (04/05/25 0738) Vital Signs (24h Range):  Temp:  [97.4 °F (36.3  °C)-99.3 °F (37.4 °C)] 98.3 °F (36.8 °C)  Pulse:  [] 105  Resp:  [16-19] 18  SpO2:  [94 %-99 %] 94 %  BP: (112-157)/(55-78) 157/78     Weight: 103.9 kg (229 lb 0.9 oz) (04/02/25 2354)  Body mass index is 31.07 kg/m².  Body surface area is 2.3 meters squared.    I/O last 3 completed shifts:  In: -   Out: 200 [Urine:200]    Physical Exam  Constitutional:       Appearance: Normal appearance.   HENT:      Head: Normocephalic and atraumatic.   Eyes:      General: No scleral icterus.     Extraocular Movements: Extraocular movements intact.      Pupils: Pupils are equal, round, and reactive to light.   Cardiovascular:      Rate and Rhythm: Normal rate and regular rhythm.   Pulmonary:      Effort: Pulmonary effort is normal.      Breath sounds: No stridor.   Musculoskeletal:      Right lower leg: Edema present.      Left lower leg: Edema present.   Skin:     General: Skin is warm and dry.   Neurological:      General: No focal deficit present.      Mental Status: He is alert and oriented to person, place, and time.   Psychiatric:         Mood and Affect: Mood normal.         Behavior: Behavior normal.         Significant Labs:sureBMP:   Recent Labs   Lab 04/04/25  0510   *   K 3.1*      CO2 20*   BUN 38*   CREATININE 1.8*   CALCIUM 8.3*   MG 1.8     CMP:   Recent Labs   Lab 04/01/25  1804 04/01/25  2334 04/04/25  0510   CALCIUM 8.1*   < > 8.3*   ALBUMIN 2.3*  --  1.8*      < > 132*   K 2.8*   < > 3.1*   CO2 27   < > 20*      < > 101   BUN 27*   < > 38*   CREATININE 1.9*   < > 1.8*   ALKPHOS 96  --   --    ALT 14  --   --    AST 19  --   --    BILITOT 1.1*  --   --     < > = values in this interval not displayed.     All labs within the past 24 hours have been reviewed.    Significant Imaging:  Labs: Reviewed      Assessment/Plan:     Active Diagnoses:    Diagnosis Date Noted POA    PRINCIPAL PROBLEM:  Cellulitis of left lower extremity [L03.116] 04/02/2025 Yes    Thrombocytopenia [D69.6]  04/03/2025 No    Sepsis [A41.9] 04/02/2025 Yes    Hypokalemia [E87.6] 04/02/2025 Yes    Hypomagnesemia [E83.42] 04/02/2025 Yes    Hypertension [I10] 04/02/2025 Yes     Chronic    Obesity (BMI 30-39.9) [E66.9] 04/02/2025 Yes     Chronic    History of DVT (deep vein thrombosis) [Z86.718] 04/02/2025 Not Applicable     Chronic    Weakness of both lower extremities [R29.898] 04/02/2025 Yes     Chronic    Anemia, chronic disease [D63.8] 03/01/2025 Yes     Chronic    Hyperlipidemia associated with type 2 diabetes mellitus [E11.69, E78.5] 12/13/2024 Yes     Chronic    Chronic venous insufficiency of lower extremity [I87.2]  Yes    Chronic combined systolic and diastolic congestive heart failure [I50.42] 03/15/2019 Yes     Chronic    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, with long-term current use of insulin [E11.3553, Z79.4] 03/27/2017 Not Applicable     Chronic    Chronic immunosuppression with Prograf, MMF and prednisone [Z29.89] 06/18/2015 Not Applicable     Chronic    Coronary artery disease of native artery of native heart with stable angina pectoris [I25.118]  Yes     Chronic    Obstructive sleep apnea [G47.33] 06/13/2013 Yes     Chronic      Problems Resolved During this Admission:       Assessment and plan:    1. Kidney transplant status: In around living related donor transplant in 2015.  Creatinine is slightly higher than his usual baseline, running around 1.8 since admission.  This morning's laboratory studies have not been resulted.  Will review when resulted.  Baseline creatinine is usually between about 1.3 and 1.5.  Will continue to monitor.    2. Immunosuppression: Currently on 4 mg twice daily of Prograf.  Prograf level yesterday resulted at 2.7.  This is somewhat lower than goal.  We will rechecked tomorrow morning and if it remains low will adjust Prograf accordingly.    3. Cellulitis:  Appears to be improving.  Defer to primary service for management.    4.  Hypokalemia:  Potassium is 3.1.  Would continue to replace per protocol.      A minimum of 50 minutes was spent in patient examination, chart review and coordination of care with other providers.    Thank you for your consult.     Noel Jimenes MD  Nephrology  [place] - Telemetry ([facility])

## 2025-04-05 NOTE — PT/OT/SLP PROGRESS
Physical Therapy Treatment    Patient Name:  Mitch Whittaker   MRN:  [id number]    Recommendations:     Discharge Recommendations: Moderate Intensity Therapy  Discharge Equipment Recommendations: none  Barriers to discharge: None    Assessment:     Mitch Whittaker is a 69 y.o. male admitted with a medical diagnosis of Cellulitis of left lower extremity.  He presents with the following impairments/functional limitations: weakness, impaired endurance, impaired self care skills, impaired functional mobility, gait instability, impaired balance, pain, decreased safety awareness, decreased lower extremity function, decreased upper extremity function, decreased coordination, impaired cognition, impaired cardiopulmonary response to activity, decreased ROM, edema, impaired sensation.    Today's treatment session limited due to decreased patient participation secondary to impaired cognition. Patient is oriented to person and place, but appears unaware of the situation. Therapist assistant informed patient of reason for current hospital stay. Patient agreed to sitting at the edge of bed to promote progressive mobility, but is confused following instruction to perform task. Therapist assistant encouraged and attempted to educate patient on the importance of early mobility and the goals of the session. Efforts deferred due to patient with complaints of pain and confusion.    Rehab Prognosis: Fair; patient would benefit from acute skilled PT services to address these deficits and reach maximum level of function.    Recent Surgery: * No surgery found *      Plan:     During this hospitalization, patient to be seen 3 x/week to address the identified rehab impairments via therapeutic activities, therapeutic exercises, neuromuscular re-education and progress toward the following goals:    Plan of Care Expires:  04/16/25    Subjective     Chief Complaint: general pain at rest and with movement  Patient/Family Comments/goals:  Therapist  "assistant provided appropriate feedback when patient repeatedly expressed, "Why am I in pain? Talk to me." throughout the session.  Pain/Comfort:  Pain Rating 1: 10/10  Location - Side 1: Bilateral  Location - Orientation 1: generalized  Location 1: leg  Pain Addressed 1: Nurse notified, Cessation of Activity  Pain Rating Post-Intervention 1: 10/10      Objective:     Communicated with pt's nurse, Bhavik, prior to session.  Patient found left sidelying with bed alarm, peripheral IV, telemetry upon PT entry to room.     Additional staff present: Asaf Therapy technician    General Precautions: Standard, fall, special contact  Orthopedic Precautions: N/A  Braces: N/A  Respiratory Status: Room air     Functional Mobility:  Pt with complaints of B LE pain when therapist assistant applied light touch. Therapist assistant encouraged patient for pacing to promote B UE and B LE movement, but patient unable to follow through. Patient mentions he is not able to move his arms and expresses pain when therapist assistant provided gentle ROM into left elbow extension. Patient independently pulled covers up to adjust bedding, and declined further treatment. Efforts discounted due to patient resistance.      AM-PAC 6 CLICK MOBILITY  Turning over in bed (including adjusting bedclothes, sheets and blankets)?: 1  Sitting down on and standing up from a chair with arms (e.g., wheelchair, bedside commode, etc.): 1  Moving from lying on back to sitting on the side of the bed?: 1  Moving to and from a bed to a chair (including a wheelchair)?: 1  Need to walk in hospital room?: 1  Climbing 3-5 steps with a railing?: 1  Basic Mobility Total Score: 6       Treatment & Education:  -Pt needs reinforcement secondary to confusion when attempted to be educated on the role of therapy, the goals of the session, and the benefits of progressive mobility. Efforts to be continued at next session.  -Instructed on the use of call button and calling " nursing staff for assistance with mobility.  -Pt questions and concerns addressed within PTA scope of practice.    Patient left left sidelying with all lines intact, call button in reach, bed alarm on, and pt's nurse notified.    GOALS:   Multidisciplinary Problems       Physical Therapy Goals          Problem: Physical Therapy    Goal Priority Disciplines Outcome Interventions   Physical Therapy Goal     PT, PT/OT     Description: Pt will perform bed mobility with mod A in order to participate in EOB activity.  Pt will perform transfers with mod A in order to participate in OOB activity.  Pt will ambulate 50 ft mod I with LRAD in order to participate in daily tasks.   Pt will tolerate sitting OOB in chair x 2-4 hrs in order to participate in daily tasks.                      Time Tracking:     PT Received On: 04/05/25  PT Start Time: 0805     PT Stop Time: 0820  PT Total Time (min): 15 min     Billable Minutes: Therapeutic Activity 15       PT/PTA: PTA     Number of PTA visits since last PT visit: 3     04/05/2025

## 2025-04-05 NOTE — PROGRESS NOTES
Pharmacist Renal Dose Adjustment Note    Mitch Whittaker is a 69 y.o. male being treated with the medication Keflex    Patient Data:    Vital Signs (Most Recent):  Temp: 97.4 °F (36.3 °C) (04/05/25 1622)  Pulse: 75 (04/05/25 1622)  Resp: 19 (04/05/25 1622)  BP: (!) 108/54 (04/05/25 1622)  SpO2: (!) 94 % (04/05/25 1622) Vital Signs (72h Range):  Temp:  [97.4 °F (36.3 °C)-99.8 °F (37.7 °C)]   Pulse:  []   Resp:  [16-20]   BP: ()/(54-78)   SpO2:  [94 %-99 %]      Recent Labs   Lab 04/03/25  0954 04/04/25  0510 04/05/25  1109   CREATININE 1.9* 1.8* 2.1*     Serum creatinine: 2.1 mg/dL (H) 04/05/25 1109  Estimated creatinine clearance: 40.2 mL/min (A)    Medication:Keflex dose: 500 mg frequency every 12 hours will be changed to medication:Keflex dose:500 mg frequency:every 8 hours    Pharmacist's Name: Juanis Castano  Pharmacist's Extension: [phone]

## 2025-04-05 NOTE — PLAN OF CARE
POC reviewed with pt. Pt verbalizes understanding of POC. No questions at this time.  Oriented to self.  NSR int. STon cardiac monitor.  Pt remains free of falls.  No complaints at this time.  Safety measures in place. Will continue to monitor.  Informed pt to call for assistance before getting up. Pt verbalizes understanding.  Hourly rounding and chart check complete.   PRN medication given for pain   OK to keep IV anticipated discharge 4/6/2025.

## 2025-04-06 PROBLEM — D69.6 THROMBOCYTOPENIA: Status: RESOLVED | Noted: 2025-04-03 | Resolved: 2025-04-06

## 2025-04-06 PROBLEM — E87.6 HYPOKALEMIA: Status: RESOLVED | Noted: 2025-04-02 | Resolved: 2025-04-06

## 2025-04-06 LAB
ALBUMIN SERPL BCP-MCNC: 1.6 G/DL (ref 3.5–5.2)
ANION GAP (OHS): 9 MMOL/L (ref 8–16)
BUN SERPL-MCNC: 46 MG/DL (ref 8–23)
CALCIUM SERPL-MCNC: 9.2 MG/DL (ref 8.7–10.5)
CHLORIDE SERPL-SCNC: 104 MMOL/L (ref 95–110)
CO2 SERPL-SCNC: 22 MMOL/L (ref 23–29)
CREAT SERPL-MCNC: 2.3 MG/DL (ref 0.5–1.4)
GFR SERPLBLD CREATININE-BSD FMLA CKD-EPI: 30 ML/MIN/1.73/M2
GLUCOSE SERPL-MCNC: 166 MG/DL (ref 70–110)
PHOSPHATE SERPL-MCNC: 2.2 MG/DL (ref 2.7–4.5)
POCT GLUCOSE: 151 MG/DL (ref 70–110)
POCT GLUCOSE: 154 MG/DL (ref 70–110)
POCT GLUCOSE: 180 MG/DL (ref 70–110)
POCT GLUCOSE: 211 MG/DL (ref 70–110)
POCT GLUCOSE: 213 MG/DL (ref 70–110)
POTASSIUM SERPL-SCNC: 3.8 MMOL/L (ref 3.5–5.1)
SODIUM SERPL-SCNC: 135 MMOL/L (ref 136–145)

## 2025-04-06 PROCEDURE — 63600175 PHARM REV CODE 636 W HCPCS: Performed by: HOSPITALIST

## 2025-04-06 PROCEDURE — 99233 SBSQ HOSP IP/OBS HIGH 50: CPT | Mod: ,,, | Performed by: INTERNAL MEDICINE

## 2025-04-06 PROCEDURE — 27000207 HC ISOLATION

## 2025-04-06 PROCEDURE — 80197 ASSAY OF TACROLIMUS: CPT | Performed by: INTERNAL MEDICINE

## 2025-04-06 PROCEDURE — 80069 RENAL FUNCTION PANEL: CPT | Performed by: INTERNAL MEDICINE

## 2025-04-06 PROCEDURE — 25000003 PHARM REV CODE 250: Performed by: HOSPITALIST

## 2025-04-06 PROCEDURE — 36415 COLL VENOUS BLD VENIPUNCTURE: CPT | Performed by: INTERNAL MEDICINE

## 2025-04-06 PROCEDURE — 21400001 HC TELEMETRY ROOM

## 2025-04-06 RX ADMIN — TACROLIMUS 4 MG: 1 CAPSULE ORAL at 05:04

## 2025-04-06 RX ADMIN — TACROLIMUS 4 MG: 1 CAPSULE ORAL at 09:04

## 2025-04-06 RX ADMIN — ASPIRIN 81 MG: 81 TABLET, COATED ORAL at 09:04

## 2025-04-06 RX ADMIN — FERROUS SULFATE TAB 325 MG (65 MG ELEMENTAL FE) 1 EACH: 325 (65 FE) TAB at 09:04

## 2025-04-06 RX ADMIN — METOPROLOL SUCCINATE 25 MG: 25 TABLET, EXTENDED RELEASE ORAL at 09:04

## 2025-04-06 RX ADMIN — INSULIN GLARGINE 10 UNITS: 100 INJECTION, SOLUTION SUBCUTANEOUS at 09:04

## 2025-04-06 RX ADMIN — CEPHALEXIN 500 MG: 500 CAPSULE ORAL at 02:04

## 2025-04-06 RX ADMIN — APIXABAN 5 MG: 2.5 TABLET, FILM COATED ORAL at 09:04

## 2025-04-06 RX ADMIN — PREDNISONE 5 MG: 5 TABLET ORAL at 09:04

## 2025-04-06 RX ADMIN — INSULIN ASPART 1 UNITS: 100 INJECTION, SOLUTION INTRAVENOUS; SUBCUTANEOUS at 09:04

## 2025-04-06 RX ADMIN — INSULIN ASPART 2 UNITS: 100 INJECTION, SOLUTION INTRAVENOUS; SUBCUTANEOUS at 05:04

## 2025-04-06 RX ADMIN — ALLOPURINOL 50 MG: 300 TABLET ORAL at 09:04

## 2025-04-06 RX ADMIN — SODIUM BICARBONATE 650 MG: 650 TABLET ORAL at 09:04

## 2025-04-06 RX ADMIN — PANTOPRAZOLE SODIUM 40 MG: 40 TABLET, DELAYED RELEASE ORAL at 09:04

## 2025-04-06 RX ADMIN — FUROSEMIDE 40 MG: 40 TABLET ORAL at 09:04

## 2025-04-06 RX ADMIN — CALCITRIOL CAPSULES 0.25 MCG 0.25 MCG: 0.25 CAPSULE ORAL at 09:04

## 2025-04-06 RX ADMIN — CEPHALEXIN 500 MG: 500 CAPSULE ORAL at 09:04

## 2025-04-06 NOTE — NURSING
Pt refused PO abx. He kept spitting it out even when daughter attempted to give it to him and explain that if he wanted to go home he had to take it. HM made aware.

## 2025-04-06 NOTE — ASSESSMENT & PLAN NOTE
Patient's most recent potassium results are listed below.   Recent Labs     04/04/25  0510 04/05/25  1109   K 3.1* 3.5     Plan  -Replete potassium per protocol  -Monitor potassium Daily  -Patient's hypokalemia is currently undergoing medical management

## 2025-04-06 NOTE — ASSESSMENT & PLAN NOTE
Anemia is likely due to chronic disease due to Chronic Kidney Disease. Most recent hemoglobin and hematocrit are listed below.  Recent Labs     04/04/25  0510 04/05/25  1109   HGB 7.9* 7.9*   HCT 26.6* 26.7*     Plan  -Monitor serial CBC: Daily  -Transfuse PRBC if patient becomes hemodynamically unstable, symptomatic or H/H drops below 7/21.  -Patient has not received any PRBC transfusions to date  -Patient's anemia is currently stable

## 2025-04-06 NOTE — PLAN OF CARE
POC reviewed with pt. Pt verbalizes understanding of POC. No questions at this time.  Oriented to self.  NSR on cardiac monitor.  Pt remains free of falls.  No complaints at this time.  Safety measures in place. Will continue to monitor.  Informed pt to call for assistance before getting up. Pt verbalizes understanding.  Hourly rounding and chart check complete.   Ok to keep IV from MD discharge plans 1-2 days.     Problem: Adult Inpatient Plan of Care  Goal: Plan of Care Review  Outcome: Progressing  Goal: Patient-Specific Goal (Individualized)  Outcome: Progressing  Goal: Absence of Hospital-Acquired Illness or Injury  Outcome: Progressing  Goal: Optimal Comfort and Wellbeing  Outcome: Progressing  Goal: Readiness for Transition of Care  Outcome: Progressing     Problem: Diabetes Comorbidity  Goal: Blood Glucose Level Within Targeted Range  Outcome: Progressing     Problem: Fall Injury Risk  Goal: Absence of Fall and Fall-Related Injury  Outcome: Progressing     Problem: [place]  Goal: Collaboration with the Healthcare Team  Outcome: Progressing

## 2025-04-06 NOTE — SUBJECTIVE & OBJECTIVE
Review of Systems   All other systems reviewed and are negative.    Objective:     Vital Signs (Most Recent):  Temp: 98 °F (36.7 °C) (04/06/25 1145)  Pulse: 98 (04/06/25 1145)  Resp: (!) 24 (04/06/25 1145)  BP: (!) 119/59 (04/06/25 1145)  SpO2: 98 % (04/06/25 1145) Vital Signs (24h Range):  Temp:  [97.4 °F (36.3 °C)-98.6 °F (37 °C)] 98 °F (36.7 °C)  Pulse:  [] 98  Resp:  [18-24] 24  SpO2:  [93 %-99 %] 98 %  BP: (108-154)/(54-73) 119/59     Weight: 103.9 kg (229 lb 0.9 oz)  Body mass index is 31.07 kg/m².  No intake or output data in the 24 hours ending 04/06/25 1236        Physical Exam  Constitutional:       Appearance: Normal appearance.   Cardiovascular:      Rate and Rhythm: Normal rate and regular rhythm.      Heart sounds: No murmur heard.  Pulmonary:      Effort: Pulmonary effort is normal. No respiratory distress.      Breath sounds: Normal breath sounds. No wheezing.   Abdominal:      General: There is no distension.      Palpations: Abdomen is soft.      Tenderness: There is no abdominal tenderness.   Skin:     Findings: Erythema present.   Neurological:      Mental Status: He is alert.               Significant Labs: All pertinent labs within the past 24 hours have been reviewed.    FL Upper GI   Final Result      No significant stenosis or narrowing of the esophagus demonstrate.  Lateral esophagram was unable to be obtained secondary to patient mobility.      Tertiary contractions.      GERD.         Electronically signed by: Salty Moreno   Date:    04/03/2025   Time:    15:48      X-Ray Chest AP Portable   Final Result   No acute cardiopulmonary disease.      Finalized on: 4/1/2025 7:26 PM By:  Raudel Mathew   [facility]# [id number]      2025-04-01 19:28:36.851     [facility]          Recent Lab Results  (Last 5 results in the past 24 hours)        04/06/25  1136   04/06/25  1131   04/06/25  0812   04/06/25  0623   04/05/25  2202        Albumin 1.6               Anion Gap 9               BUN 46                Calcium 9.2               Chloride 104               CO2 22               Creatinine 2.3               eGFR 30  Comment: Estimated GFR calculated using the CKD-EPI creatinine (2021) equation.               Glucose 166               Phosphorus Level 2.2               POCT Glucose   180   151   154   251       Potassium 3.8               Sodium 135                                      Significant Imaging: I have reviewed all pertinent imaging results/findings within the past 24 hours.    FL Upper GI   Final Result      No significant stenosis or narrowing of the esophagus demonstrate.  Lateral esophagram was unable to be obtained secondary to patient mobility.      Tertiary contractions.      GERD.         Electronically signed by: Salty Moreno   Date:    04/03/2025   Time:    15:48      X-Ray Chest AP Portable   Final Result   No acute cardiopulmonary disease.      Finalized on: 4/1/2025 7:26 PM By:  Raudel Mathew   [facility]# [id number]      2025-04-01 19:28:36.851     [facility]

## 2025-04-06 NOTE — PROGRESS NOTES
[facility] Medicine  Progress Note    Patient Name: Mitch Whittaker  MRN: [id number]  Patient Class: IP- Inpatient   Admission Date: 4/1/2025  Length of Stay: 5 days  Attending Physician: Carlos Mathew MD  Primary Care Provider: Valery Caal MD        Subjective     Principal Problem:Cellulitis of left lower extremity        HPI:  Mitch Whittaker is a 69 y.o. 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), Diabetic retinopathy, DM (diabetes mellitus), type 2 with complications (1994), Edema, End stage kidney disease, Gallbladder polyp, Heart failure, diastolic, due to HTN, Hemodialysis status, 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 who presented to the ED for further evaluation of worsening left leg pain and swelling which began last night.  Patient reports being bed-bound but suffers from chronic venous stasis and recurrent skin infections.  Patient reported symptoms began acutely with no known alleviating or aggravating factors noted with a associated symptoms including nonproductive cough in addition to those noted above.  He denied  endorsing any recent trauma to the affected area and reported being in his usual state of health prior to onset of symptoms.  All other review of systems negative except as noted above.  Initial workup in the ED revealed patient met SIRS criteria for sepsis with concerns for underlying cellulitis and was initiated on cefepime.  Remaining laboratory workup revealed H/H 9.6/31.8, potassium 2.8, creatinine/GFR 1.9/37, blood glucose 61, magnesium 1.1, troponin 0.152, lactic acid within normal limits, procalcitonin 1.60, chest x-ray negative for acute findings.  Patient admitted to Hospital Medicine inpatient for continued medical management.    PCP: Valery Caal      Overview/Hospital Course:    4/2/25  Patient admitted or LLE cellulitis, continue cefepime  Noted bilateral chronic venous stasis, Wound Care consulted  Reports BLE weakness x 2 months, difficulty ambulating  Replete K and Mg  PT/OT consult    4/3/25  Patient with dysphagia and early satiety, ST consulted  Hx of Tacrolimus induced GI toxicity  Upper GI FL pending  Patient with renal transplant, on tacrolimus and cellcept  Restart tacrolimus, consult Nephrology for assitance in transplant management  Continue cefepime for LLE celulitis  PT/OT with reccs for JOSE, consult SW for SNF placement    4/4/25  NAEON, patient resting in bed, no complaints  Blood cultures NGTD, check procal, possibly de-escalate cefepime 1-2 days  On IDDSI level 5 diet per  reccs, appreciate assistance, will plan for outpatient GI referral  Stop famotidine, start Protonix, plan to continue on discharge    4/5/25  NAEON, AM labs pending  LLE cellulitis improving  Cr 1.8, Nephrology following, on Tacrolimus  Family at bedside, updated      Review of Systems   All other systems reviewed and are negative.    Objective:     Vital Signs (Most Recent):  Temp: 97.7 °F (36.5 °C) (04/06/25 0754)  Pulse: (!) 115 (04/06/25 0920)  Resp: 20 (04/06/25 0754)  BP: (!) 154/73 (04/06/25  0754)  SpO2: 98 % (04/06/25 0920) Vital Signs (24h Range):  Temp:  [97.4 °F (36.3 °C)-98.6 °F (37 °C)] 97.7 °F (36.5 °C)  Pulse:  [] 115  Resp:  [18-20] 20  SpO2:  [93 %-99 %] 98 %  BP: ()/(54-73) 154/73     Weight: 103.9 kg (229 lb 0.9 oz)  Body mass index is 31.07 kg/m².  No intake or output data in the 24 hours ending 04/06/25 1100        Physical Exam  Constitutional:       Appearance: Normal appearance. He is obese.   Cardiovascular:      Rate and Rhythm: Normal rate and regular rhythm.      Heart sounds: No murmur heard.  Pulmonary:      Effort: Pulmonary effort is normal. No respiratory distress.      Breath sounds: Normal breath sounds. No wheezing.   Abdominal:      General: There is no distension.      Palpations: Abdomen is soft.      Tenderness: There is no abdominal tenderness.   Skin:     Findings: Erythema present.   Neurological:      Mental Status: He is alert.               Significant Labs: All pertinent labs within the past 24 hours have been reviewed.    FL Upper GI   Final Result      No significant stenosis or narrowing of the esophagus demonstrate.  Lateral esophagram was unable to be obtained secondary to patient mobility.      Tertiary contractions.      GERD.         Electronically signed by: Salty Moreno   Date:    04/03/2025   Time:    15:48      X-Ray Chest AP Portable   Final Result   No acute cardiopulmonary disease.      Finalized on: 4/1/2025 7:26 PM By:  Raudel Mathew   [facility]# [id number]      2025-04-01 19:28:36.851     [facility]          Recent Lab Results  (Last 5 results in the past 24 hours)        04/06/25  0812   04/06/25  0623   04/05/25  2202   04/05/25  1728   04/05/25  1145        Albumin               Anion Gap               Aniso               Bands               Basophil %               BUN               Calcium               Chloride               CO2               Creatinine               eGFR               Eos %               Glucose               Gran #  "(ANC)               Hematocrit               Hemoglobin               Hypo               Lymph %               MCH               MCHC               MCV               Mono %               MPV               nRBC               Ovalocytes               Phosphorus Level               Platelet Estimate               Platelet Count               POCT Glucose 151   154   251   127   146       Poikilocytosis               Potassium               RBC               RDW               Segmented Neutrophil %               Sodium               WBC                                      Significant Imaging: I have reviewed all pertinent imaging results/findings within the past 24 hours.    FL Upper GI   Final Result      No significant stenosis or narrowing of the esophagus demonstrate.  Lateral esophagram was unable to be obtained secondary to patient mobility.      Tertiary contractions.      GERD.         Electronically signed by: Salty Moreno   Date:    04/03/2025   Time:    15:48      X-Ray Chest AP Portable   Final Result   No acute cardiopulmonary disease.      Finalized on: 4/1/2025 7:26 PM By:  Raudel Mathew   [facility]# [id number]      2025-04-01 19:28:36.851     [facility]              Assessment & Plan  Cellulitis of left lower extremity  Continue cefepime  Wound care    Chronic venous insufficiency of lower extremity      Sepsis  This patient does have evidence of infective focus  My overall impression is sepsis.  Source: Skin and Soft Tissue (location left leg cellulitis, gout of left knee)  Antibiotics given-   Antibiotics (72h ago, onward)      Start     Stop Route Frequency Ordered    04/05/25 2200  cephALEXin capsule 500 mg         -- Oral Every 8 hours 04/05/25 1654          Latest lactate reviewed-  No results for input(s): "LACTATE", "POCLAC" in the last 72 hours.    Organ dysfunction indicated by Acute kidney injury    Fluid challenge Ideal Body Weight- The patient's ideal body weight is Ideal body weight: 77.6 kg (171 lb " "1.2 oz) which will be used to calculate fluid bolus of 30 ml/kg for treatment of septic shock.      Post- resuscitation assessment No - Post resuscitation assessment not needed     Will Not start Pressors- Levophed for MAP of 65    Source control achieved by: Cefepime    Follow up uric acid level and consider ortho consult for left knee aspiration to fully rule out gout flare.    Weakness of both lower extremities  Patient reported worsening bilateral lower extremity weakness times 2-3 months in which he reported symptoms began acutely after his right leg gave out going to the restroom.  Patient denies endorsing any back pain, experiencing ground level fall/trauma, but does report fecal incontinence when urinating.  Patient has since been bed-bound in his not been seen/evaluated by a physician for these symptoms.  Plan:  -replete electrolytes  -consider obtaining spine imaging for further evaluation  -PT/OT    Hypokalemia  Patient's most recent potassium results are listed below.   Recent Labs     04/04/25  0510 04/05/25  1109   K 3.1* 3.5     Plan  -Replete potassium per protocol  -Monitor potassium Daily  -Patient's hypokalemia is  currently undergoing medical management    Hypomagnesemia  Patient has Abnormal Magnesium: hypomagnesemia. Will continue to monitor electrolytes closely. Will replace the affected electrolytes and repeat labs to be done after interventions completed. The patient's magnesium results have been reviewed and are listed below.  No results for input(s): "MG" in the last 24 hours.      Chronic combined systolic and diastolic congestive heart failure  Patient has Combined Systolic and Diastolic heart failure that is Chronic. On presentation their CHF was well compensated. Most recent BNP and echo results are listed below.  No results for input(s): "BNP" in the last 72 hours.  Latest ECHO  Results for orders placed during the hospital encounter of 03/13/25    Echo Saline Bubble? " No    Interpretation Summary    Left Ventricle: The left ventricle is normal in size. Mildly increased ventricular mass. Mildly increased wall thickness. There is mild concentric hypertrophy. Normal wall motion. Septal motion is consistent with bundle branch block. There is moderately reduced systolic function with a visually estimated ejection fraction of 35 - 40%. Grade I diastolic dysfunction.    Right Ventricle: The right ventricle is normal in size. Wall thickness is normal. Systolic function is normal.    Left Atrium: Mildly dilated    Aorta: Aortic annulus is mildly dilated measuring 4.01 cm. Ascending aorta is normal measuring 3.69 cm.    Pulmonary Artery: The estimated pulmonary artery systolic pressure is 24 mmHg.    IVC/SVC: Normal venous pressure at 3 mmHg.    Current Heart Failure Medications  metoprolol succinate (TOPROL-XL) 24 hr tablet 25 mg, Daily, Oral  furosemide tablet 40 mg, Daily, Oral    Plan  -Monitor strict I&Os and daily weights.    -Place on telemetry  -Low sodium diet  -Place on fluid restriction of 1.5 L.   -Cardiology has not been consulted  -The patient's volume status is at their baseline  -Continue home medications    Hypertension  Chronic, uncontrolled.  Latest blood pressure and vitals reviewed-   Temp:  [97.4 °F (36.3 °C)-98.6 °F (37 °C)]   Pulse:  []   Resp:  [18-20]   BP: ()/(54-73)   SpO2:  [93 %-99 %] .   Home meds for hypertension were reviewed and noted below.   Hypertension Medications              furosemide (LASIX) 40 MG tablet Take 1 tablet (40 mg total) by mouth once daily.    metoprolol succinate (TOPROL-XL) 25 MG 24 hr tablet Take 1 tablet (25 mg total) by mouth once daily.    sacubitriL-valsartan (ENTRESTO)  mg per tablet ([Paused] since 3/11/2025 12:59 PM) Take 1 tablet by mouth 2 (two) times daily.     While in the hospital, will manage blood pressure as follows; Continue home antihypertensive regimen    Will utilize p.r.n. blood pressure  medication only if patient's blood pressure greater than  180/110 and he develops symptoms such as worsening chest pain or shortness of breath.    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  Last A1c reviewed-   Lab Results   Component Value Date    HGBA1C 5.7 (H) 12/17/2024     Most recent fingerstick glucose reviewed-   Recent Labs   Lab 04/05/25  1728 04/05/25  2202 04/06/25  0623 04/06/25  0812   POCTGLUCOSE 127* 251* 154* 151*     Current correctional scale  Low  Titrate as needed  anti-hyperglycemic dose as follows-   Antihyperglycemics (From admission, onward)      Start     Stop Route Frequency Ordered    04/03/25 1245  insulin glargine U-100 (Lantus) pen 10 Units         -- SubQ 2 times daily 04/03/25 1136    04/01/25 2134  insulin aspart U-100 pen 0-5 Units         -- SubQ Before meals & nightly PRN 04/01/25 2035          Plan:  -SSI  -A1c  -Accu-checks  -Hold oral hypoglycemics while patient is in the hospital  -Continue home long-acting insulin at 20% decrease, titrate up as needed  -Hypoglycemic protocol      Anemia, chronic disease  Anemia is likely due to chronic disease due to Chronic Kidney Disease. Most recent hemoglobin and hematocrit are listed below.  Recent Labs     04/04/25  0510 04/05/25  1109   HGB 7.9* 7.9*   HCT 26.6* 26.7*     Plan  -Monitor serial CBC: Daily  -Transfuse PRBC if patient becomes hemodynamically unstable, symptomatic or H/H drops below 7/21.  -Patient has not received any PRBC transfusions to date  -Patient's anemia is currently stable    Coronary artery disease of native artery of native heart with stable angina pectoris  Patient with known CAD, which is controlled.  EKG negative for signs of acute ischemia.  Troponin elevated at 0.15 to likely secondary to demand ischemia in setting of sepsis and CHF. Will continue   home medications  and monitor for S/Sx of angina/ACS. Continue to monitor on telemetry.    Chronic immunosuppression with Prograf, MMF and prednisone  Patient currently on Prograf, CellCept, and mycophenolate.  Plan:  -continue Prograf and CellCept  -holding mycophenolate    Hyperlipidemia associated with type 2 diabetes mellitus  Patient is chronically on statin.will continue for now. Last Lipid Panel:   Lab Results   Component Value Date    CHOL 175 12/17/2024    HDL 50 12/17/2024    LDLCALC 98.2 12/17/2024    TRIG 134 12/17/2024    CHOLHDL 28.6 12/17/2024     Plan:  -Continue home medication  -low fat/low calorie diet    History of DVT (deep vein thrombosis)  Currently on Eliquis outpatient.  Plan:  -continue home medication  -monitor H/H    Obstructive sleep apnea  Currently on CPAP outpatient.  Plan:  -continue CPAP q.h.s.    Obesity (BMI 30-39.9)  Body mass index is 31.07 kg/m². Morbid obesity complicates all aspects of disease management from diagnostic modalities to treatment. Weight loss encouraged and health benefits explained to patient.     Thrombocytopenia  The likely etiology of thrombocytopenia is infection. The patients 3 most recent labs are listed below.  Recent Labs     04/04/25  0510 04/05/25  1109   * 153     Plan  - Will transfuse if platelet count is <10k.    VTE Risk Mitigation (From admission, onward)           Ordered     apixaban tablet 5 mg  2 times daily         04/03/25 1141     Reason for No Pharmacological VTE Prophylaxis  Once        Question:  Reasons:  Answer:  Already adequately anticoagulated on oral Anticoagulants    04/01/25 2035     IP VTE HIGH RISK PATIENT  Once         04/01/25 2035     Place sequential compression device  Until discontinued         04/01/25 2035                    Discharge Planning   GRETEL: 4/5/2025     Code Status: Full Code   Medical Readiness for Discharge Date:   Discharge Plan A: Home Health, Home with family                Please place  Justification for DME        Carlos Mathew MD  Department of Hospital Medicine   [facility] - Telemetry ([facility])

## 2025-04-06 NOTE — PROGRESS NOTES
[facility] Medicine  Progress Note    Patient Name: Mitch Whittaker  MRN: [id number]  Patient Class: IP- Inpatient   Admission Date: 4/1/2025  Length of Stay: 5 days  Attending Physician: Carlos Mathew MD  Primary Care Provider: Valery Caal MD        Subjective     Principal Problem:Cellulitis of left lower extremity        HPI:  Mitch Whittaker is a 69 y.o. 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), Diabetic retinopathy, DM (diabetes mellitus), type 2 with complications (1994), Edema, End stage kidney disease, Gallbladder polyp, Heart failure, diastolic, due to HTN, Hemodialysis status, 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 who presented to the ED for further evaluation of worsening left leg pain and swelling which began last night.  Patient reports being bed-bound but suffers from chronic venous stasis and recurrent skin infections.  Patient reported symptoms began acutely with no known alleviating or aggravating factors noted with a associated symptoms including nonproductive cough in addition to those noted above.  He denied  endorsing any recent trauma to the affected area and reported being in his usual state of health prior to onset of symptoms.  All other review of systems negative except as noted above.  Initial workup in the ED revealed patient met SIRS criteria for sepsis with concerns for underlying cellulitis and was initiated on cefepime.  Remaining laboratory workup revealed H/H 9.6/31.8, potassium 2.8, creatinine/GFR 1.9/37, blood glucose 61, magnesium 1.1, troponin 0.152, lactic acid within normal limits, procalcitonin 1.60, chest x-ray negative for acute findings.  Patient admitted to Hospital Medicine inpatient for continued medical management.    PCP: Valery Caal      Overview/Hospital Course:    4/2/25  Patient admitted or LLE cellulitis, continue cefepime  Noted bilateral chronic venous stasis, Wound Care consulted  Reports BLE weakness x 2 months, difficulty ambulating  Replete K and Mg  PT/OT consult    4/3/25  Patient with dysphagia and early satiety, ST consulted  Hx of Tacrolimus induced GI toxicity  Upper GI FL pending  Patient with renal transplant, on tacrolimus and cellcept  Restart tacrolimus, consult Nephrology for assitance in transplant management  Continue cefepime for LLE celulitis  PT/OT with reccs for JOSE, consult SW for SNF placement    4/4/25  NAEON, patient resting in bed, no complaints  Blood cultures NGTD, check procal, possibly de-escalate cefepime 1-2 days  On IDDSI level 5 diet per  rec, appreciate assistance, will plan for outpatient GI referral  Stop famotidine, start Protonix, plan to continue on discharge    4/5/25  NAEON, AM labs pending  LLE cellulitis improving  Cr 1.8, Nephrology following, on Tacrolimus  Family at bedside, updated    4/6/25  NAEON, Cr 1.8 --> 2.3, tacrolimus level pending  Continue Keflex for cellulitis   Patient reports fatigue, generalized weakness  Consult PT/OT, may benefit from SNF/rehab      Review of Systems   All other systems reviewed and are  negative.    Objective:     Vital Signs (Most Recent):  Temp: 98 °F (36.7 °C) (04/06/25 1145)  Pulse: 98 (04/06/25 1145)  Resp: (!) 24 (04/06/25 1145)  BP: (!) 119/59 (04/06/25 1145)  SpO2: 98 % (04/06/25 1145) Vital Signs (24h Range):  Temp:  [97.4 °F (36.3 °C)-98.6 °F (37 °C)] 98 °F (36.7 °C)  Pulse:  [] 98  Resp:  [18-24] 24  SpO2:  [93 %-99 %] 98 %  BP: (108-154)/(54-73) 119/59     Weight: 103.9 kg (229 lb 0.9 oz)  Body mass index is 31.07 kg/m².  No intake or output data in the 24 hours ending 04/06/25 1236        Physical Exam  Constitutional:       Appearance: Normal appearance.   Cardiovascular:      Rate and Rhythm: Normal rate and regular rhythm.      Heart sounds: No murmur heard.  Pulmonary:      Effort: Pulmonary effort is normal. No respiratory distress.      Breath sounds: Normal breath sounds. No wheezing.   Abdominal:      General: There is no distension.      Palpations: Abdomen is soft.      Tenderness: There is no abdominal tenderness.   Skin:     Findings: Erythema present.   Neurological:      Mental Status: He is alert.               Significant Labs: All pertinent labs within the past 24 hours have been reviewed.    FL Upper GI   Final Result      No significant stenosis or narrowing of the esophagus demonstrate.  Lateral esophagram was unable to be obtained secondary to patient mobility.      Tertiary contractions.      GERD.         Electronically signed by: Salty Moreno   Date:    04/03/2025   Time:    15:48      X-Ray Chest AP Portable   Final Result   No acute cardiopulmonary disease.      Finalized on: 4/1/2025 7:26 PM By:  Raudel Mathew   [facility]# [id number]      2025-04-01 19:28:36.851     [facility]          Recent Lab Results  (Last 5 results in the past 24 hours)        04/06/25  1136   04/06/25  1131   04/06/25  0812   04/06/25  0623   04/05/25  2202        Albumin 1.6               Anion Gap 9               BUN 46               Calcium 9.2               Chloride 104                "CO2 22               Creatinine 2.3               eGFR 30  Comment: Estimated GFR calculated using the CKD-EPI creatinine (2021) equation.               Glucose 166               Phosphorus Level 2.2               POCT Glucose   180   151   154   251       Potassium 3.8               Sodium 135                                      Significant Imaging: I have reviewed all pertinent imaging results/findings within the past 24 hours.    FL Upper GI   Final Result      No significant stenosis or narrowing of the esophagus demonstrate.  Lateral esophagram was unable to be obtained secondary to patient mobility.      Tertiary contractions.      GERD.         Electronically signed by: Salty Moreno   Date:    04/03/2025   Time:    15:48      X-Ray Chest AP Portable   Final Result   No acute cardiopulmonary disease.      Finalized on: 4/1/2025 7:26 PM By:  Raudel Mathew   [facility]# [id number]      2025-04-01 19:28:36.851     [facility]              Assessment & Plan  Cellulitis of left lower extremity  Started on cefepime on admission 4/1/25  De-escalated to Keflex 4/5/25  Abx day #6    LLE cellulitis improving  Wound Care following  Continue supportive care  Wound Care following  Chronic venous insufficiency of lower extremity  Wound care  Sepsis  This patient does have evidence of infective focus  My overall impression is sepsis.  Source: Skin and Soft Tissue (location left leg cellulitis, gout of left knee)  Antibiotics given-   Antibiotics (72h ago, onward)      Start     Stop Route Frequency Ordered    04/05/25 2200  cephALEXin capsule 500 mg         -- Oral Every 8 hours 04/05/25 1654          Latest lactate reviewed-  No results for input(s): "LACTATE", "POCLAC" in the last 72 hours.    Organ dysfunction indicated by Acute kidney injury    Fluid challenge Ideal Body Weight- The patient's ideal body weight is Ideal body weight: 77.6 kg (171 lb 1.2 oz) which will be used to calculate fluid bolus of 30 ml/kg for treatment of " "septic shock.      Post- resuscitation assessment No - Post resuscitation assessment not needed     Will Not start Pressors- Levophed for MAP of 65    Source control achieved by: Cefepime    Follow up uric acid level and consider ortho consult for left knee aspiration to fully rule out gout flare.    Weakness of both lower extremities  Patient reported worsening bilateral lower extremity weakness times 2-3 months in which he reported symptoms began acutely after his right leg gave out going to the restroom.  Patient denies endorsing any back pain, experiencing ground level fall/trauma, but does report fecal incontinence when urinating.  Patient has since been bed-bound in his not been seen/evaluated by a physician for these symptoms.  Plan:  -replete electrolytes  -consider obtaining spine imaging for further evaluation  -PT/OT    Hypokalemia (Resolved: 4/6/2025)  Patient's most recent potassium results are listed below.   Recent Labs     04/04/25  0510 04/05/25  1109 04/06/25  1136   K 3.1* 3.5 3.8     Plan  -Replete potassium per protocol  -Monitor potassium Daily  -Patient's hypokalemia is  currently undergoing medical management    Hypomagnesemia  Patient has Abnormal Magnesium: hypomagnesemia. Will continue to monitor electrolytes closely. Will replace the affected electrolytes and repeat labs to be done after interventions completed. The patient's magnesium results have been reviewed and are listed below.  No results for input(s): "MG" in the last 24 hours.      Chronic combined systolic and diastolic congestive heart failure  Patient has Combined Systolic and Diastolic heart failure that is Chronic. On presentation their CHF was well compensated. Most recent BNP and echo results are listed below.  No results for input(s): "BNP" in the last 72 hours.  Latest ECHO  Results for orders placed during the hospital encounter of 03/13/25    Echo Saline Bubble? No    Interpretation Summary    Left Ventricle: The left " ventricle is normal in size. Mildly increased ventricular mass. Mildly increased wall thickness. There is mild concentric hypertrophy. Normal wall motion. Septal motion is consistent with bundle branch block. There is moderately reduced systolic function with a visually estimated ejection fraction of 35 - 40%. Grade I diastolic dysfunction.    Right Ventricle: The right ventricle is normal in size. Wall thickness is normal. Systolic function is normal.    Left Atrium: Mildly dilated    Aorta: Aortic annulus is mildly dilated measuring 4.01 cm. Ascending aorta is normal measuring 3.69 cm.    Pulmonary Artery: The estimated pulmonary artery systolic pressure is 24 mmHg.    IVC/SVC: Normal venous pressure at 3 mmHg.    Current Heart Failure Medications  metoprolol succinate (TOPROL-XL) 24 hr tablet 25 mg, Daily, Oral  furosemide tablet 40 mg, Daily, Oral    Plan  -Monitor strict I&Os and daily weights.    -Place on telemetry  -Low sodium diet  -Place on fluid restriction of 1.5 L.   -Cardiology has not been consulted  -The patient's volume status is at their baseline  -Continue home medications    Hypertension  Chronic, uncontrolled.  Latest blood pressure and vitals reviewed-   Temp:  [97.4 °F (36.3 °C)-98.6 °F (37 °C)]   Pulse:  []   Resp:  [18-24]   BP: (108-154)/(54-73)   SpO2:  [93 %-99 %] .   Home meds for hypertension were reviewed and noted below.   Hypertension Medications              furosemide (LASIX) 40 MG tablet Take 1 tablet (40 mg total) by mouth once daily.    metoprolol succinate (TOPROL-XL) 25 MG 24 hr tablet Take 1 tablet (25 mg total) by mouth once daily.    sacubitriL-valsartan (ENTRESTO)  mg per tablet ([Paused] since 3/11/2025 12:59 PM) Take 1 tablet by mouth 2 (two) times daily.     While in the hospital, will manage blood pressure as follows; Continue home antihypertensive regimen    Will utilize p.r.n. blood pressure medication only if patient's blood pressure greater than   180/110 and he develops symptoms such as worsening chest pain or shortness of breath.    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  Last A1c reviewed-   Lab Results   Component Value Date    HGBA1C 5.7 (H) 12/17/2024     Most recent fingerstick glucose reviewed-   Recent Labs   Lab 04/05/25  2202 04/06/25  0623 04/06/25  0812 04/06/25  1131   POCTGLUCOSE 251* 154* 151* 180*     Current correctional scale  Low  Titrate as needed  anti-hyperglycemic dose as follows-   Antihyperglycemics (From admission, onward)      Start     Stop Route Frequency Ordered    04/03/25 1245  insulin glargine U-100 (Lantus) pen 10 Units         -- SubQ 2 times daily 04/03/25 1136    04/01/25 2134  insulin aspart U-100 pen 0-5 Units         -- SubQ Before meals & nightly PRN 04/01/25 2035          Plan:  -SSI  -A1c  -Accu-checks  -Hold oral hypoglycemics while patient is in the hospital  -Continue home long-acting insulin at 20% decrease, titrate up as needed  -Hypoglycemic protocol      Anemia, chronic disease  Anemia is likely due to chronic disease due to Chronic Kidney Disease. Most recent hemoglobin and hematocrit are listed below.  Recent Labs     04/04/25  0510 04/05/25  1109   HGB 7.9* 7.9*   HCT 26.6* 26.7*     Plan  -Monitor serial CBC: Daily  -Transfuse PRBC if patient becomes hemodynamically unstable, symptomatic or H/H drops below 7/21.  -Patient has not received any PRBC transfusions to date  -Patient's anemia is currently stable    Coronary artery disease of native artery of native heart with stable angina pectoris  Patient with known CAD, which is controlled.  EKG negative for signs of acute ischemia.  Troponin elevated at 0.15 to likely secondary to demand ischemia in setting of sepsis and CHF. Will continue  home medications  and monitor for S/Sx of angina/ACS.  Continue to monitor on telemetry.    Chronic immunosuppression with Prograf, MMF and prednisone  Patient currently on Prograf, CellCept, and mycophenolate.  Plan:  -continue Prograf and CellCept  -holding mycophenolate    Hyperlipidemia associated with type 2 diabetes mellitus  Patient is chronically on statin.will continue for now. Last Lipid Panel:   Lab Results   Component Value Date    CHOL 175 12/17/2024    HDL 50 12/17/2024    LDLCALC 98.2 12/17/2024    TRIG 134 12/17/2024    CHOLHDL 28.6 12/17/2024     Plan:  -Continue home medication  -low fat/low calorie diet    History of DVT (deep vein thrombosis)  Currently on Eliquis outpatient.  Plan:  -continue home medication  -monitor H/H    Obstructive sleep apnea  Currently on CPAP outpatient.  Plan:  -continue CPAP q.h.s.    Obesity (BMI 30-39.9)  Body mass index is 31.07 kg/m². Morbid obesity complicates all aspects of disease management from diagnostic modalities to treatment. Weight loss encouraged and health benefits explained to patient.     Thrombocytopenia (Resolved: 4/6/2025)  The likely etiology of thrombocytopenia is infection. The patients 3 most recent labs are listed below.  Recent Labs     04/04/25  0510 04/05/25  1109   * 153     Plan  - Will transfuse if platelet count is <10k.    VTE Risk Mitigation (From admission, onward)           Ordered     apixaban tablet 5 mg  2 times daily         04/03/25 1141     Reason for No Pharmacological VTE Prophylaxis  Once        Question:  Reasons:  Answer:  Already adequately anticoagulated on oral Anticoagulants    04/01/25 2035     IP VTE HIGH RISK PATIENT  Once         04/01/25 2035     Place sequential compression device  Until discontinued         04/01/25 2035                    Discharge Planning   GRETEL: 4/5/2025     Code Status: Full Code   Medical Readiness for Discharge Date:   Discharge Plan A: Home Health, Home with family                Please place Justification for ALEX Mathew  MD  Department of Hospital Medicine   [facility] - Telemetry ([facility])

## 2025-04-06 NOTE — ASSESSMENT & PLAN NOTE
Started on cefepime on admission 4/1/25  De-escalated to Keflex 4/5/25  Abx day #6    LLE cellulitis improving  Wound Care following  Continue supportive care  Wound Care following

## 2025-04-06 NOTE — SUBJECTIVE & OBJECTIVE
Review of Systems   All other systems reviewed and are negative.    Objective:     Vital Signs (Most Recent):  Temp: 97.7 °F (36.5 °C) (04/06/25 0754)  Pulse: (!) 115 (04/06/25 0920)  Resp: 20 (04/06/25 0754)  BP: (!) 154/73 (04/06/25 0754)  SpO2: 98 % (04/06/25 0920) Vital Signs (24h Range):  Temp:  [97.4 °F (36.3 °C)-98.6 °F (37 °C)] 97.7 °F (36.5 °C)  Pulse:  [] 115  Resp:  [18-20] 20  SpO2:  [93 %-99 %] 98 %  BP: ()/(54-73) 154/73     Weight: 103.9 kg (229 lb 0.9 oz)  Body mass index is 31.07 kg/m².  No intake or output data in the 24 hours ending 04/06/25 1100        Physical Exam  Constitutional:       Appearance: Normal appearance. He is obese.   Cardiovascular:      Rate and Rhythm: Normal rate and regular rhythm.      Heart sounds: No murmur heard.  Pulmonary:      Effort: Pulmonary effort is normal. No respiratory distress.      Breath sounds: Normal breath sounds. No wheezing.   Abdominal:      General: There is no distension.      Palpations: Abdomen is soft.      Tenderness: There is no abdominal tenderness.   Skin:     Findings: Erythema present.   Neurological:      Mental Status: He is alert.               Significant Labs: All pertinent labs within the past 24 hours have been reviewed.    FL Upper GI   Final Result      No significant stenosis or narrowing of the esophagus demonstrate.  Lateral esophagram was unable to be obtained secondary to patient mobility.      Tertiary contractions.      GERD.         Electronically signed by: Salty Moreno   Date:    04/03/2025   Time:    15:48      X-Ray Chest AP Portable   Final Result   No acute cardiopulmonary disease.      Finalized on: 4/1/2025 7:26 PM By:  Raudel Mathew   [facility]# [id number]      2025-04-01 19:28:36.851     [facility]          Recent Lab Results  (Last 5 results in the past 24 hours)        04/06/25  0812   04/06/25  0623   04/05/25  2202   04/05/25  1728   04/05/25  1145        Albumin               Anion Gap               Aniso                Bands               Basophil %               BUN               Calcium               Chloride               CO2               Creatinine               eGFR               Eos %               Glucose               Gran # (ANC)               Hematocrit               Hemoglobin               Hypo               Lymph %               MCH               MCHC               MCV               Mono %               MPV               nRBC               Ovalocytes               Phosphorus Level               Platelet Estimate               Platelet Count               POCT Glucose 151   154   251   127   146       Poikilocytosis               Potassium               RBC               RDW               Segmented Neutrophil %               Sodium               WBC                                      Significant Imaging: I have reviewed all pertinent imaging results/findings within the past 24 hours.    FL Upper GI   Final Result      No significant stenosis or narrowing of the esophagus demonstrate.  Lateral esophagram was unable to be obtained secondary to patient mobility.      Tertiary contractions.      GERD.         Electronically signed by: Salty Moreno   Date:    04/03/2025   Time:    15:48      X-Ray Chest AP Portable   Final Result   No acute cardiopulmonary disease.      Finalized on: 4/1/2025 7:26 PM By:  Raudel Mathew   [facility]# [id number]      2025-04-01 19:28:36.851     [facility]

## 2025-04-06 NOTE — ASSESSMENT & PLAN NOTE
The likely etiology of thrombocytopenia is infection. The patients 3 most recent labs are listed below.  Recent Labs     04/04/25  0510 04/05/25  1109   * 153     Plan  - Will transfuse if platelet count is <10k.

## 2025-04-06 NOTE — PLAN OF CARE
Problem: Adult Inpatient Plan of Care  Goal: Plan of Care Review  Outcome: Progressing  Goal: Patient-Specific Goal (Individualized)  Outcome: Progressing  Goal: Absence of Hospital-Acquired Illness or Injury  Outcome: Progressing  Goal: Optimal Comfort and Wellbeing  Outcome: Progressing  Goal: Readiness for Transition of Care  Outcome: Progressing     Problem: Skin Injury Risk Increased  Goal: Skin Health and Integrity  Outcome: Progressing     Problem: Diabetes Comorbidity  Goal: Blood Glucose Level Within Targeted Range  Outcome: Progressing     Problem: Wound  Goal: Absence of Infection Signs and Symptoms  Outcome: Progressing     Problem: Wound  Goal: Optimal Pain Control and Function  Outcome: Progressing     Problem: Wound  Goal: Skin Health and Integrity  Outcome: Progressing     Problem: Wound  Goal: Optimal Wound Healing  Outcome: Progressing     Problem: Sepsis/Septic Shock  Goal: Absence of Infection Signs and Symptoms  Outcome: Progressing     Problem: Fall Injury Risk  Goal: Absence of Fall and Fall-Related Injury  Outcome: Progressing     Problem: Infection  Goal: Absence of Infection Signs and Symptoms  Outcome: Progressing     Problem: UTI (Urinary Tract Infection)  Goal: Improved Infection Symptoms  Outcome: Progressing

## 2025-04-06 NOTE — ASSESSMENT & PLAN NOTE
[facility]  Plan:  -continue home medication  -monitor H/H

## 2025-04-06 NOTE — ASSESSMENT & PLAN NOTE
Patient's FSGs are uncontrolled due to hypoglycemia on current medication regimen.  Last A1c reviewed-   Lab Results   Component Value Date    HGBA1C 5.7 (H) 12/17/2024     Most recent fingerstick glucose reviewed-   Recent Labs   Lab 04/05/25  1728 04/05/25  2202 04/06/25  0623 04/06/25  0812   POCTGLUCOSE 127* 251* 154* 151*     Current correctional scale  Low  Titrate as needed anti-hyperglycemic dose as follows-   Antihyperglycemics (From admission, onward)      Start     Stop Route Frequency Ordered    04/03/25 1245  insulin glargine U-100 (Lantus) pen 10 Units         -- SubQ 2 times daily 04/03/25 1136    04/01/25 2134  insulin aspart U-100 pen 0-5 Units         -- SubQ Before meals & nightly PRN 04/01/25 2035          Plan:  -SSI  -A1c  -Accu-checks  -Hold oral hypoglycemics while patient is in the hospital  -Continue home long-acting insulin at 20% decrease, titrate up as needed  -Hypoglycemic protocol

## 2025-04-06 NOTE — ASSESSMENT & PLAN NOTE
Patient's most recent potassium results are listed below.   Recent Labs     04/04/25  0510 04/05/25  1109 04/06/25  1136   K 3.1* 3.5 3.8     Plan  -Replete potassium per protocol  -Monitor potassium Daily  -Patient's hypokalemia is currently undergoing medical management

## 2025-04-06 NOTE — ASSESSMENT & PLAN NOTE
"This patient does have evidence of infective focus  My overall impression is sepsis.  Source: Skin and Soft Tissue (location left leg cellulitis, gout of left knee)  Antibiotics given-   Antibiotics (72h ago, onward)      Start     Stop Route Frequency Ordered    04/05/25 2200  cephALEXin capsule 500 mg         -- Oral Every 8 hours 04/05/25 1654          Latest lactate reviewed-  No results for input(s): "LACTATE", "POCLAC" in the last 72 hours.    Organ dysfunction indicated by Acute kidney injury    Fluid challenge Ideal Body Weight- The patient's ideal body weight is Ideal body weight: 77.6 kg (171 lb 1.2 oz) which will be used to calculate fluid bolus of 30 ml/kg for treatment of septic shock.      Post- resuscitation assessment No - Post resuscitation assessment not needed     Will Not start Pressors- Levophed for MAP of 65    Source control achieved by: Cefepime    Follow up uric acid level and consider ortho consult for left knee aspiration to fully rule out gout flare.    "

## 2025-04-06 NOTE — ASSESSMENT & PLAN NOTE
Patient's FSGs are uncontrolled due to hypoglycemia on current medication regimen.  Last A1c reviewed-   Lab Results   Component Value Date    HGBA1C 5.7 (H) 12/17/2024     Most recent fingerstick glucose reviewed-   Recent Labs   Lab 04/05/25  2202 04/06/25  0623 04/06/25  0812 04/06/25  1131   POCTGLUCOSE 251* 154* 151* 180*     Current correctional scale  Low  Titrate as needed anti-hyperglycemic dose as follows-   Antihyperglycemics (From admission, onward)      Start     Stop Route Frequency Ordered    04/03/25 1245  insulin glargine U-100 (Lantus) pen 10 Units         -- SubQ 2 times daily 04/03/25 1136    04/01/25 2134  insulin aspart U-100 pen 0-5 Units         -- SubQ Before meals & nightly PRN 04/01/25 2035          Plan:  -SSI  -A1c  -Accu-checks  -Hold oral hypoglycemics while patient is in the hospital  -Continue home long-acting insulin at 20% decrease, titrate up as needed  -Hypoglycemic protocol

## 2025-04-06 NOTE — ASSESSMENT & PLAN NOTE
Chronic, uncontrolled.  Latest blood pressure and vitals reviewed-   Temp:  [97.4 °F (36.3 °C)-98.6 °F (37 °C)]   Pulse:  []   Resp:  [18-20]   BP: ()/(54-73)   SpO2:  [93 %-99 %] .   Home meds for hypertension were reviewed and noted below.   Hypertension Medications              furosemide (LASIX) 40 MG tablet Take 1 tablet (40 mg total) by mouth once daily.    metoprolol succinate (TOPROL-XL) 25 MG 24 hr tablet Take 1 tablet (25 mg total) by mouth once daily.    sacubitriL-valsartan (ENTRESTO)  mg per tablet ([Paused] since 3/11/2025 12:59 PM) Take 1 tablet by mouth 2 (two) times daily.     While in the hospital, will manage blood pressure as follows; Continue home antihypertensive regimen    Will utilize p.r.n. blood pressure medication only if patient's blood pressure greater than  180/110 and he develops symptoms such as worsening chest pain or shortness of breath.

## 2025-04-06 NOTE — ASSESSMENT & PLAN NOTE
Chronic, uncontrolled.  Latest blood pressure and vitals reviewed-   Temp:  [97.4 °F (36.3 °C)-98.6 °F (37 °C)]   Pulse:  []   Resp:  [18-24]   BP: (108-154)/(54-73)   SpO2:  [93 %-99 %] .   Home meds for hypertension were reviewed and noted below.   Hypertension Medications              furosemide (LASIX) 40 MG tablet Take 1 tablet (40 mg total) by mouth once daily.    metoprolol succinate (TOPROL-XL) 25 MG 24 hr tablet Take 1 tablet (25 mg total) by mouth once daily.    sacubitriL-valsartan (ENTRESTO)  mg per tablet ([Paused] since 3/11/2025 12:59 PM) Take 1 tablet by mouth 2 (two) times daily.     While in the hospital, will manage blood pressure as follows; Continue home antihypertensive regimen    Will utilize p.r.n. blood pressure medication only if patient's blood pressure greater than  180/110 and he develops symptoms such as worsening chest pain or shortness of breath.

## 2025-04-06 NOTE — ASSESSMENT & PLAN NOTE
[facility]  Plan:  -continue home medication  -monitor H/H

## 2025-04-06 NOTE — PROGRESS NOTES
[facility])  Nephrology  Progress Note    Patient Name: Mitch Whittaker  MRN: [id number]  Admission Date: 4/1/2025  Hospital Length of Stay: 5 days  Attending Provider: Carlos Mathew MD   Primary Care Physician: Valery Caal MD  Principal Problem:Cellulitis of left lower extremity    Consults  Subjective:     The patient is a 69 y.o. male with a hx of ESRD who underwent living related kidney transplant about 10 years ago.  He has CKD stage 3 and is followed by Dr. Gonsalez of [facility].  History of CMV viremia with titer positive as recently as April of 2023 per outpatient notes from Dr. Gonsalez.  He has a multitude of chronic other medical conditions including CHF, type 2 diabetes, obesity etcetera.  He takes tacrolimus, prednisone, and mycophenolate for his immunosuppression.  Of note his mycophenolate was held for a period of time earlier in the year in the setting of his active C diff infection.  During his recent hospital stay this was assumed around March 20th.     Patient was most recently in the hospital about 2 weeks ago.  During the hospital stay he was treated with IV diuresis for acute on chronic heart failure.  He was also on oral vancomycin for recent C diff infection.  He was discharged March 22nd with a creatinine around 1.7.     Patient now presents yet again to the hospital April 1st and is admitted to the hospital by hospital medicine with lower extremity cellulitis.  We are consulted to assist in the care of this patient with renal transplant and CKD.  This morning his potassium was 3.3, BUN 35 and creatinine 1.9.    04/04/2025:  Patient was seen in his hospital room.  In bed resting comfortably.  No acute distress noted.  No new complaints from overnight.    04/05/2025:  Patient was seen in his hospital room.  In bed resting comfortably.  A family member was at the bedside assisting with breakfast.  No acute distress noted.  No new complaints from  overnight.    04/06/2025: Patient was seen in his hospital room.  In bed resting comfortably.  No acute distress noted.    Review of systems: No shortness of breath or chest discomfort.  No fevers or chills no nausea vomiting or diarrhea.  He does have some mild swelling in his feet bilaterally.    Review of patient's allergies indicates:   Allergen Reactions    Lisinopril Other (See Comments)     Other reaction(s):  cough    Actos  [pioglitazone] Other (See Comments)     Other reaction(s): CHF    Metformin Other (See Comments)     Other reaction(s): renal insuff  Other reaction(s): CHF     Current Facility-Administered Medications   Medication Frequency    acetaminophen suppository 650 mg Q6H PRN    acetaminophen tablet 650 mg Q6H PRN    allopurinol split tablet 50 mg Daily    aluminum-magnesium hydroxide-simethicone 200-200-20 mg/5 mL suspension 30 mL QID PRN    apixaban tablet 5 mg BID    aspirin EC tablet 81 mg Daily    calcitRIOL capsule 0.25 mcg Daily    cephALEXin capsule 500 mg Q8H    dextrose 50% injection 12.5 g PRN    dextrose 50% injection 25 g PRN    ferrous sulfate tablet 1 each Daily    furosemide tablet 40 mg Daily    glucagon (human recombinant) injection 1 mg PRN    glucose chewable tablet 16 g PRN    glucose chewable tablet 24 g PRN    HYDROcodone-acetaminophen 5-325 mg per tablet 1 tablet Q6H PRN    insulin aspart U-100 pen 0-5 Units QID (AC + HS) PRN    insulin glargine U-100 (Lantus) pen 10 Units BID    melatonin tablet 6 mg Nightly PRN    metoprolol succinate (TOPROL-XL) 24 hr tablet 25 mg Daily    morphine injection 2 mg Q4H PRN    naloxone 0.4 mg/mL injection 0.02 mg PRN    ondansetron injection 4 mg Q8H PRN    pantoprazole EC tablet 40 mg Daily    polyethylene glycol packet 17 g Daily PRN    predniSONE tablet 5 mg Daily    promethazine tablet 25 mg Q6H PRN    simethicone chewable tablet 80 mg QID PRN    sodium bicarbonate tablet 650 mg BID    sodium chloride 0.9% flush 3 mL Q12H PRN     tacrolimus capsule 4 mg BID       Objective:     Vital Signs (Most Recent):  Temp: 97.7 °F (36.5 °C) (04/06/25 0754)  Pulse: (!) 111 (04/06/25 0754)  Resp: 20 (04/06/25 0754)  BP: (!) 154/73 (04/06/25 0754)  SpO2: 99 % (04/06/25 0754) Vital Signs (24h Range):  Temp:  [97.4 °F (36.3 °C)-98.6 °F (37 °C)] 97.7 °F (36.5 °C)  Pulse:  [] 111  Resp:  [18-20] 20  SpO2:  [93 %-99 %] 99 %  BP: ()/(54-73) 154/73     Weight: 103.9 kg (229 lb 0.9 oz) (04/02/25 2354)  Body mass index is 31.07 kg/m².  Body surface area is 2.3 meters squared.    No intake/output data recorded.    Physical Exam  Constitutional:       Appearance: Normal appearance.   HENT:      Head: Normocephalic and atraumatic.   Eyes:      General: No scleral icterus.     Extraocular Movements: Extraocular movements intact.      Pupils: Pupils are equal, round, and reactive to light.   Cardiovascular:      Rate and Rhythm: Normal rate and regular rhythm.   Pulmonary:      Effort: Pulmonary effort is normal.      Breath sounds: No stridor.   Musculoskeletal:      Right lower leg: Edema present.      Left lower leg: Edema present.   Skin:     General: Skin is warm and dry.   Neurological:      General: No focal deficit present.      Mental Status: He is alert and oriented to person, place, and time.   Psychiatric:         Mood and Affect: Mood normal.         Behavior: Behavior normal.         Significant Labs:sureBMP:   Recent Labs   Lab 04/04/25  0510 04/05/25  1109   * 134*   K 3.1* 3.5    102   CO2 20* 23   BUN 38* 42*   CREATININE 1.8* 2.1*   CALCIUM 8.3* 8.7   MG 1.8  --      CMP:   Recent Labs   Lab 04/01/25  1804 04/01/25  2334 04/05/25  1109   CALCIUM 8.1*   < > 8.7   ALBUMIN 2.3*   < > 1.6*      < > 134*   K 2.8*   < > 3.5   CO2 27   < > 23      < > 102   BUN 27*   < > 42*   CREATININE 1.9*   < > 2.1*   ALKPHOS 96  --   --    ALT 14  --   --    AST 19  --   --    BILITOT 1.1*  --   --     < > = values in this interval  not displayed.     All labs within the past 24 hours have been reviewed.    Significant Imaging:  Labs: Reviewed      Assessment/Plan:     Active Diagnoses:    Diagnosis Date Noted POA    PRINCIPAL PROBLEM:  Cellulitis of left lower extremity [L03.116] 04/02/2025 Yes    Thrombocytopenia [D69.6] 04/03/2025 No    Sepsis [A41.9] 04/02/2025 Yes    Hypokalemia [E87.6] 04/02/2025 Yes    Hypomagnesemia [E83.42] 04/02/2025 Yes    Hypertension [I10] 04/02/2025 Yes     Chronic    Obesity (BMI 30-39.9) [E66.9] 04/02/2025 Yes     Chronic    History of DVT (deep vein thrombosis) [Z86.718] 04/02/2025 Not Applicable     Chronic    Weakness of both lower extremities [R29.898] 04/02/2025 Yes     Chronic    Anemia, chronic disease [D63.8] 03/01/2025 Yes     Chronic    Hyperlipidemia associated with type 2 diabetes mellitus [E11.69, E78.5] 12/13/2024 Yes     Chronic    Chronic venous insufficiency of lower extremity [I87.2]  Yes    Chronic combined systolic and diastolic congestive heart failure [I50.42] 03/15/2019 Yes     Chronic    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, with long-term current use of insulin [E11.3553, Z79.4] 03/27/2017 Not Applicable     Chronic    Chronic immunosuppression with Prograf, MMF and prednisone [Z29.89] 06/18/2015 Not Applicable     Chronic    Coronary artery disease of native artery of native heart with stable angina pectoris [I25.118]  Yes     Chronic    Obstructive sleep apnea [G47.33] 06/13/2013 Yes     Chronic      Problems Resolved During this Admission:       Assessment and plan:    1. Kidney transplant status: In around living related donor transplant in 2015.  Creatinine is slightly higher than his usual baseline, running around 1.8 since admission.  Most recent creatinine was 2.1.  Essentially stable.    Baseline creatinine is usually between about 1.3 and 1.5.  Will continue to monitor.    2. Immunosuppression: Currently on 4 mg twice daily of Prograf.   Prograf level yesterday resulted at 2.7.  This is somewhat lower than goal.  Prograf level from today is pending..    3. Cellulitis:  Appears to be improving.  Defer to primary service for management.    4. Hypokalemia:  Potassium is 3.1.  Would continue to replace per protocol.      A minimum of 50 minutes was spent in patient examination, chart review and coordination of care with other providers.    Thank you for your consult.     Noel Jimenes MD  Nephrology  [facility] - Telemetry ([facility])

## 2025-04-07 PROBLEM — G93.40 ACUTE ENCEPHALOPATHY: Status: ACTIVE | Noted: 2025-04-07

## 2025-04-07 LAB
ABSOLUTE NEUTROPHIL MANUAL (OHS): 0.6 K/UL
ALBUMIN SERPL BCP-MCNC: 1.6 G/DL (ref 3.5–5.2)
AMMONIA PLAS-SCNC: 24 UMOL/L (ref 10–50)
ANION GAP (OHS): 11 MMOL/L (ref 8–16)
ANISOCYTOSIS BLD QL SMEAR: SLIGHT
BACTERIA BLD CULT: NORMAL
BACTERIA BLD CULT: NORMAL
BILIRUB UR QL STRIP.AUTO: NEGATIVE
BUN SERPL-MCNC: 49 MG/DL (ref 8–23)
CALCIUM SERPL-MCNC: 9.2 MG/DL (ref 8.7–10.5)
CHLORIDE SERPL-SCNC: 105 MMOL/L (ref 95–110)
CLARITY UR: CLEAR
CO2 SERPL-SCNC: 21 MMOL/L (ref 23–29)
COLOR UR AUTO: COLORLESS
CREAT SERPL-MCNC: 2.4 MG/DL (ref 0.5–1.4)
CREAT UR-MCNC: 20.2 MG/DL (ref 23–375)
EOSINOPHIL NFR BLD MANUAL: 1 % (ref 0–8)
ERYTHROCYTE [DISTWIDTH] IN BLOOD BY AUTOMATED COUNT: 17.5 % (ref 11.5–14.5)
FOLATE SERPL-MCNC: 12.1 NG/ML (ref 4–24)
GFR SERPLBLD CREATININE-BSD FMLA CKD-EPI: 28 ML/MIN/1.73/M2
GLUCOSE SERPL-MCNC: 135 MG/DL (ref 70–110)
GLUCOSE UR QL STRIP: NEGATIVE
HCT VFR BLD AUTO: 26.3 % (ref 40–54)
HGB BLD-MCNC: 7.7 GM/DL (ref 14–18)
HGB UR QL STRIP: NEGATIVE
HYPOCHROMIA BLD QL SMEAR: ABNORMAL
KETONES UR QL STRIP: NEGATIVE
LEUKOCYTE ESTERASE UR QL STRIP: NEGATIVE
LYMPHOCYTES NFR BLD MANUAL: 48 % (ref 18–48)
MCH RBC QN AUTO: 24.4 PG (ref 27–31)
MCHC RBC AUTO-ENTMCNC: 29.3 G/DL (ref 32–36)
MCV RBC AUTO: 84 FL (ref 82–98)
MONOCYTES NFR BLD MANUAL: 20 % (ref 4–15)
NEUTROPHILS NFR BLD MANUAL: 29 % (ref 38–73)
NEUTS BAND NFR BLD MANUAL: 2 %
NITRITE UR QL STRIP: NEGATIVE
NUCLEATED RBC (/100WBC) (OHS): 0 /100 WBC
OVALOCYTES BLD QL SMEAR: ABNORMAL
PH UR STRIP: 5 [PH]
PHOSPHATE SERPL-MCNC: 2.8 MG/DL (ref 2.7–4.5)
PLATELET # BLD AUTO: 182 K/UL (ref 150–450)
PLATELET BLD QL SMEAR: ABNORMAL
PLATELET BLD QL SMEAR: ABNORMAL
PMV BLD AUTO: 11.8 FL (ref 9.2–12.9)
POCT GLUCOSE: 132 MG/DL (ref 70–110)
POCT GLUCOSE: 154 MG/DL (ref 70–110)
POCT GLUCOSE: 181 MG/DL (ref 70–110)
POCT GLUCOSE: 184 MG/DL (ref 70–110)
POIKILOCYTOSIS BLD QL SMEAR: SLIGHT
POTASSIUM SERPL-SCNC: 3.7 MMOL/L (ref 3.5–5.1)
PROT UR QL STRIP: ABNORMAL
RBC # BLD AUTO: 3.15 M/UL (ref 4.6–6.2)
SODIUM SERPL-SCNC: 137 MMOL/L (ref 136–145)
SODIUM UR-SCNC: 95 MMOL/L (ref 20–250)
SP GR UR STRIP: 1.01
TACROLIMUS BLD-MCNC: 4.7 NG/ML (ref 5–15)
TACROLIMUS BLD-MCNC: 7 NG/ML (ref 5–15)
TSH SERPL-ACNC: 0.45 UIU/ML (ref 0.4–4)
URATE SERPL-MCNC: 10.4 MG/DL (ref 3.4–7)
UROBILINOGEN UR STRIP-ACNC: NEGATIVE EU/DL
VIT B12 SERPL-MCNC: >2000 PG/ML (ref 210–950)
WBC # BLD AUTO: 1.9 K/UL (ref 3.9–12.7)

## 2025-04-07 PROCEDURE — 97530 THERAPEUTIC ACTIVITIES: CPT

## 2025-04-07 PROCEDURE — 82140 ASSAY OF AMMONIA: CPT | Performed by: INTERNAL MEDICINE

## 2025-04-07 PROCEDURE — 99233 SBSQ HOSP IP/OBS HIGH 50: CPT | Mod: ,,, | Performed by: INTERNAL MEDICINE

## 2025-04-07 PROCEDURE — 82570 ASSAY OF URINE CREATININE: CPT | Performed by: INTERNAL MEDICINE

## 2025-04-07 PROCEDURE — 82607 VITAMIN B-12: CPT | Performed by: INTERNAL MEDICINE

## 2025-04-07 PROCEDURE — 63600175 PHARM REV CODE 636 W HCPCS: Performed by: HOSPITALIST

## 2025-04-07 PROCEDURE — 63600175 PHARM REV CODE 636 W HCPCS: Performed by: NURSE PRACTITIONER

## 2025-04-07 PROCEDURE — 80197 ASSAY OF TACROLIMUS: CPT | Performed by: INTERNAL MEDICINE

## 2025-04-07 PROCEDURE — 84550 ASSAY OF BLOOD/URIC ACID: CPT | Performed by: INTERNAL MEDICINE

## 2025-04-07 PROCEDURE — 85027 COMPLETE CBC AUTOMATED: CPT | Performed by: HOSPITALIST

## 2025-04-07 PROCEDURE — 36415 COLL VENOUS BLD VENIPUNCTURE: CPT | Performed by: INTERNAL MEDICINE

## 2025-04-07 PROCEDURE — 84300 ASSAY OF URINE SODIUM: CPT | Performed by: INTERNAL MEDICINE

## 2025-04-07 PROCEDURE — 82746 ASSAY OF FOLIC ACID SERUM: CPT | Performed by: INTERNAL MEDICINE

## 2025-04-07 PROCEDURE — 25000003 PHARM REV CODE 250: Performed by: HOSPITALIST

## 2025-04-07 PROCEDURE — 21400001 HC TELEMETRY ROOM

## 2025-04-07 PROCEDURE — 81003 URINALYSIS AUTO W/O SCOPE: CPT | Performed by: INTERNAL MEDICINE

## 2025-04-07 PROCEDURE — 84443 ASSAY THYROID STIM HORMONE: CPT | Performed by: INTERNAL MEDICINE

## 2025-04-07 PROCEDURE — 84295 ASSAY OF SERUM SODIUM: CPT | Performed by: INTERNAL MEDICINE

## 2025-04-07 PROCEDURE — 27000207 HC ISOLATION

## 2025-04-07 RX ORDER — QUETIAPINE FUMARATE 25 MG/1
25 TABLET, FILM COATED ORAL NIGHTLY
Status: DISCONTINUED | OUTPATIENT
Start: 2025-04-07 | End: 2025-04-11 | Stop reason: HOSPADM

## 2025-04-07 RX ADMIN — CEPHALEXIN 500 MG: 500 CAPSULE ORAL at 01:04

## 2025-04-07 RX ADMIN — PANTOPRAZOLE SODIUM 40 MG: 40 TABLET, DELAYED RELEASE ORAL at 08:04

## 2025-04-07 RX ADMIN — ALLOPURINOL 50 MG: 300 TABLET ORAL at 08:04

## 2025-04-07 RX ADMIN — SODIUM BICARBONATE 650 MG: 650 TABLET ORAL at 08:04

## 2025-04-07 RX ADMIN — TACROLIMUS 4 MG: 1 CAPSULE ORAL at 08:04

## 2025-04-07 RX ADMIN — INSULIN GLARGINE 10 UNITS: 100 INJECTION, SOLUTION SUBCUTANEOUS at 08:04

## 2025-04-07 RX ADMIN — CALCITRIOL CAPSULES 0.25 MCG 0.25 MCG: 0.25 CAPSULE ORAL at 08:04

## 2025-04-07 RX ADMIN — METOPROLOL SUCCINATE 25 MG: 25 TABLET, EXTENDED RELEASE ORAL at 08:04

## 2025-04-07 RX ADMIN — ASPIRIN 81 MG: 81 TABLET, COATED ORAL at 08:04

## 2025-04-07 RX ADMIN — PREDNISONE 5 MG: 5 TABLET ORAL at 08:04

## 2025-04-07 RX ADMIN — APIXABAN 5 MG: 2.5 TABLET, FILM COATED ORAL at 08:04

## 2025-04-07 RX ADMIN — CEPHALEXIN 500 MG: 500 CAPSULE ORAL at 07:04

## 2025-04-07 RX ADMIN — FUROSEMIDE 40 MG: 40 TABLET ORAL at 08:04

## 2025-04-07 RX ADMIN — TACROLIMUS 4 MG: 1 CAPSULE ORAL at 05:04

## 2025-04-07 RX ADMIN — MORPHINE SULFATE 2 MG: 4 INJECTION INTRAVENOUS at 01:04

## 2025-04-07 RX ADMIN — FERROUS SULFATE TAB 325 MG (65 MG ELEMENTAL FE) 1 EACH: 325 (65 FE) TAB at 08:04

## 2025-04-07 NOTE — PROGRESS NOTES
[facility])  Nephrology  Progress Note    Patient Name: Mitch Whittaker  MRN: [id number]  Admission Date: 4/1/2025  Hospital Length of Stay: 6 days  Attending Provider: Zeenat Cordova MD   Primary Care Physician: Valery Caal MD  Principal Problem:Cellulitis of left lower extremity    Consults  Subjective:     The patient is a 69 y.o. male with a hx of ESRD who underwent living related kidney transplant about 10 years ago.  He has CKD stage 3 and is followed by Dr. Gonsalez of [facility].  History of CMV viremia with titer positive as recently as April of 2023 per outpatient notes from Dr. Gonsalez.  He has a multitude of chronic other medical conditions including CHF, type 2 diabetes, obesity etcetera.  He takes tacrolimus, prednisone, and mycophenolate for his immunosuppression.  Of note his mycophenolate was held for a period of time earlier in the year in the setting of his active C diff infection.  During his recent hospital stay this was assumed around March 20th.     Patient was most recently in the hospital about 2 weeks ago.  During the hospital stay he was treated with IV diuresis for acute on chronic heart failure.  He was also on oral vancomycin for recent C diff infection.  He was discharged March 22nd with a creatinine around 1.7.     Patient now presents yet again to the hospital April 1st and is admitted to the hospital by hospital medicine with lower extremity cellulitis.  We are consulted to assist in the care of this patient with renal transplant and CKD.  This morning his potassium was 3.3, BUN 35 and creatinine 1.9.    04/04/2025:  Patient was seen in his hospital room.  In bed resting comfortably.  No acute distress noted.  No new complaints from overnight.    04/05/2025:  Patient was seen in his hospital room.  In bed resting comfortably.  A family member was at the bedside assisting with breakfast.  No acute distress noted.  No new complaints from  overnight.    04/06/2025: Patient was seen in his hospital room.  In bed resting comfortably.  No acute distress noted.    04/07/2025: Patient was seen in his hospital room.  In bed resting comfortably.  No acute distress noted.  Somewhat disoriented this morning.    Review of systems: No shortness of breath or chest discomfort.  No fevers or chills no nausea vomiting or diarrhea.  He does have some mild swelling in his feet bilaterally.    Review of patient's allergies indicates:   Allergen Reactions    Lisinopril Other (See Comments)     Other reaction(s):  cough    Actos  [pioglitazone] Other (See Comments)     Other reaction(s): CHF    Metformin Other (See Comments)     Other reaction(s): renal insuff  Other reaction(s): CHF     Current Facility-Administered Medications   Medication Frequency    acetaminophen suppository 650 mg Q6H PRN    acetaminophen tablet 650 mg Q6H PRN    allopurinol split tablet 50 mg Daily    apixaban tablet 5 mg BID    aspirin EC tablet 81 mg Daily    calcitRIOL capsule 0.25 mcg Daily    cephALEXin capsule 500 mg Q8H    dextrose 50% injection 12.5 g PRN    dextrose 50% injection 25 g PRN    ferrous sulfate tablet 1 each Daily    furosemide tablet 40 mg Daily    glucagon (human recombinant) injection 1 mg PRN    glucose chewable tablet 16 g PRN    glucose chewable tablet 24 g PRN    HYDROcodone-acetaminophen 5-325 mg per tablet 1 tablet Q6H PRN    insulin aspart U-100 pen 0-5 Units QID (AC + HS) PRN    insulin glargine U-100 (Lantus) pen 10 Units BID    melatonin tablet 6 mg Nightly PRN    metoprolol succinate (TOPROL-XL) 24 hr tablet 25 mg Daily    morphine injection 2 mg Q4H PRN    naloxone 0.4 mg/mL injection 0.02 mg PRN    ondansetron injection 4 mg Q8H PRN    pantoprazole EC tablet 40 mg Daily    polyethylene glycol packet 17 g Daily PRN    predniSONE tablet 5 mg Daily    promethazine tablet 25 mg Q6H PRN    simethicone chewable tablet 80 mg QID PRN    sodium bicarbonate tablet 650  mg BID    sodium chloride 0.9% flush 3 mL Q12H PRN    tacrolimus capsule 4 mg BID       Objective:     Vital Signs (Most Recent):  Temp: 98.7 °F (37.1 °C) (04/07/25 0803)  Pulse: 106 (04/07/25 1106)  Resp: 18 (04/07/25 0803)  BP: (!) 145/70 (04/07/25 0803)  SpO2: 97 % (04/07/25 1100) Vital Signs (24h Range):  Temp:  [97.7 °F (36.5 °C)-98.7 °F (37.1 °C)] 98.7 °F (37.1 °C)  Pulse:  [] 106  Resp:  [16-24] 18  SpO2:  [92 %-98 %] 97 %  BP: (119-166)/(59-79) 145/70     Weight: 103.9 kg (229 lb 0.9 oz) (04/02/25 2354)  Body mass index is 31.07 kg/m².  Body surface area is 2.3 meters squared.    No intake/output data recorded.    Physical Exam  Constitutional:       Appearance: Normal appearance.   HENT:      Head: Normocephalic and atraumatic.   Eyes:      General: No scleral icterus.     Extraocular Movements: Extraocular movements intact.      Pupils: Pupils are equal, round, and reactive to light.   Cardiovascular:      Rate and Rhythm: Normal rate and regular rhythm.   Pulmonary:      Effort: Pulmonary effort is normal.      Breath sounds: No stridor.   Musculoskeletal:      Right lower leg: Edema present.      Left lower leg: Edema present.   Skin:     General: Skin is warm and dry.   Neurological:      General: No focal deficit present.      Mental Status: He is alert and oriented to person, place, and time.   Psychiatric:         Mood and Affect: Mood normal.         Behavior: Behavior normal.         Significant Labs:sureBMP:   Recent Labs   Lab 04/04/25  0510 04/05/25  1109 04/07/25  0449   *   < > 137   K 3.1*   < > 3.7      < > 105   CO2 20*   < > 21*   BUN 38*   < > 49*   CREATININE 1.8*   < > 2.4*   CALCIUM 8.3*   < > 9.2   MG 1.8  --   --     < > = values in this interval not displayed.     CMP:   Recent Labs   Lab 04/01/25  1804 04/01/25  2334 04/07/25  0449   CALCIUM 8.1*   < > 9.2   ALBUMIN 2.3*   < > 1.6*      < > 137   K 2.8*   < > 3.7   CO2 27   < > 21*      < > 105   BUN  27*   < > 49*   CREATININE 1.9*   < > 2.4*   ALKPHOS 96  --   --    ALT 14  --   --    AST 19  --   --    BILITOT 1.1*  --   --     < > = values in this interval not displayed.     All labs within the past 24 hours have been reviewed.    Significant Imaging:  Labs: Reviewed      Assessment/Plan:     Active Diagnoses:    Diagnosis Date Noted POA    PRINCIPAL PROBLEM:  Cellulitis of left lower extremity [L03.116] 04/02/2025 Yes    Sepsis [A41.9] 04/02/2025 Yes    Hypomagnesemia [E83.42] 04/02/2025 Yes    Hypertension [I10] 04/02/2025 Yes     Chronic    Obesity (BMI 30-39.9) [E66.9] 04/02/2025 Yes     Chronic    History of DVT (deep vein thrombosis) [Z86.718] 04/02/2025 Not Applicable     Chronic    Weakness of both lower extremities [R29.898] 04/02/2025 Yes     Chronic    Anemia, chronic disease [D63.8] 03/01/2025 Yes     Chronic    Hyperlipidemia associated with type 2 diabetes mellitus [E11.69, E78.5] 12/13/2024 Yes     Chronic    Chronic venous insufficiency of lower extremity [I87.2]  Yes    Chronic combined systolic and diastolic congestive heart failure [I50.42] 03/15/2019 Yes     Chronic    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, with long-term current use of insulin [E11.3553, Z79.4] 03/27/2017 Not Applicable     Chronic    Chronic immunosuppression with Prograf, MMF and prednisone [Z29.89] 06/18/2015 Not Applicable     Chronic    Coronary artery disease of native artery of native heart with stable angina pectoris [I25.118]  Yes     Chronic    Obstructive sleep apnea [G47.33] 06/13/2013 Yes     Chronic      Problems Resolved During this Admission:    Diagnosis Date Noted Date Resolved POA    Thrombocytopenia [D69.6] 04/03/2025 04/06/2025 No    Hypokalemia [E87.6] 04/02/2025 04/06/2025 Yes       Assessment and plan:    1. Kidney transplant status: In around living related donor transplant in 2015.  Creatinine is slightly higher than his usual baseline, running 1.8 and 2.4.   Creatinine has trended up over the past few days.  Unfortunately I's and O's have not been monitored.    Will check urine studies.  Given his fluctuation in mental status he may be behind on fluid.    Baseline creatinine is usually between about 1.3 and 1.5.  Will continue to monitor.    2. Immunosuppression: Currently on 4 mg twice daily of Prograf.  Prograf level yesterday was 4.7.  This is adequate.    3. Cellulitis:  Appears to be improving.  Defer to primary service for management.    4. Hypokalemia:  Potassium is 3.1.  Would continue to replace per protocol.      A minimum of 50 minutes was spent in patient examination, chart review and coordination of care with other providers.    Thank you for your consult.     Noel Jimenes MD  Nephrology  [facility] - Telemetry ([facility])

## 2025-04-07 NOTE — PLAN OF CARE
Continue OT POC.  Pt lethargic at this time and with difficulty maintaining alertness. Family member in room reporting pt received morphine around 1pm.

## 2025-04-07 NOTE — ASSESSMENT & PLAN NOTE
Patient currently on Prograf, CellCept, and mycophenolate.  Plan:  -continue Prograf and CellCept  -holding mycophenolate  Nephrology consulted for management of immunosuppressant medications   Monitor Prograf level per renal

## 2025-04-07 NOTE — ASSESSMENT & PLAN NOTE
Anemia is likely due to chronic disease due to Chronic Kidney Disease. Most recent hemoglobin and hematocrit are listed below.  Recent Labs     04/05/25  1109 04/07/25  0449   HGB 7.9* 7.7*   HCT 26.7* 26.3*     Plan  -Monitor serial CBC: Daily  -Transfuse PRBC if patient becomes hemodynamically unstable, symptomatic or H/H drops below 7/21.  -Patient has not received any PRBC transfusions to date  -Patient's anemia is currently stable

## 2025-04-07 NOTE — ASSESSMENT & PLAN NOTE
This patient does have evidence of infective focus  My overall impression is sepsis.  Source: Skin and Soft Tissue (location left leg cellulitis, gout of left knee)  Antibiotics given-   Antibiotics (72h ago, onward)      Start     Stop Route Frequency Ordered    04/05/25 2200  cephALEXin capsule 500 mg         -- Oral Every 8 hours 04/05/25 1654          Continue p.o. Keflex   Obtain uric acid with a.m. labs to rule out gout

## 2025-04-07 NOTE — ASSESSMENT & PLAN NOTE
Patient reported worsening bilateral lower extremity weakness times 2-3 months in which he reported symptoms began acutely after his right leg gave out going to the restroom.  Patient denies endorsing any back pain, experiencing ground level fall/trauma, but does report fecal incontinence when urinating.  Patient has since been bed-bound in his not been seen/evaluated by a physician for these symptoms.  PT/OT evaluated and recommended moderate intensity therapy on discharge  Social work consulted and following

## 2025-04-07 NOTE — CONSULTS
SW met with patient and both daughters at bedside. Family continues to decline placement recs. Preference to resume [facility] and family support at [place]. SW to remain available as needed.

## 2025-04-07 NOTE — NURSING
Critical lab result received from MARTIN Ocampo in lab. Critical WBC 1.90 K/uL. GENET Cao MD made aware, verbalized understanding of result. No new orders at this time.

## 2025-04-07 NOTE — SUBJECTIVE & OBJECTIVE
Interval History:  See hospital course    Review of Systems  Objective:     Vital Signs (Most Recent):  Temp: 97.9 °F (36.6 °C) (04/07/25 1229)  Pulse: 107 (04/07/25 1513)  Resp: 18 (04/07/25 1348)  BP: 121/71 (04/07/25 1229)  SpO2: 97 % (04/07/25 1513) Vital Signs (24h Range):  Temp:  [97.7 °F (36.5 °C)-98.7 °F (37.1 °C)] 97.9 °F (36.6 °C)  Pulse:  [] 107  Resp:  [16-18] 18  SpO2:  [92 %-98 %] 97 %  BP: (121-145)/(70-72) 121/71     Weight: 103.9 kg (229 lb 0.9 oz)  Body mass index is 31.07 kg/m².  No intake or output data in the 24 hours ending 04/07/25 1605      Physical Exam  Vitals and nursing note reviewed. Exam conducted with a chaperone present.   Constitutional:       General: He is not in acute distress.     Appearance: He is ill-appearing.   Cardiovascular:      Rate and Rhythm: Normal rate and regular rhythm.      Heart sounds: No murmur heard.  Pulmonary:      Effort: Pulmonary effort is normal.      Breath sounds: Normal breath sounds. No wheezing or rales.   Abdominal:      General: Bowel sounds are normal. There is no distension.      Palpations: Abdomen is soft.      Tenderness: There is no abdominal tenderness.   Musculoskeletal:      Right lower leg: Edema present.      Left lower leg: Edema present.   Skin:     Comments: Faint erythema noted to bilateral lower extremities, chronic wounds   Neurological:      Mental Status: He is alert.      Comments: Oriented to self, place but not situation or time, follow some commands but quickly becomes agitated when asked questions.               Significant Labs: All pertinent labs within the past 24 hours have been reviewed.    Significant Imaging: I have reviewed all pertinent imaging results/findings within the past 24 hours.

## 2025-04-07 NOTE — PLAN OF CARE
A254/A254 SB Whittaker is a 69 y.o.male admitted on 4/1/2025 for Cellulitis of left lower extremity   Code Status: Full Code MRN: [id number]   Review of patient's allergies indicates:   Allergen Reactions    Lisinopril Other (See Comments)     Other reaction(s):  cough    Actos  [pioglitazone] Other (See Comments)     Other reaction(s): CHF    Metformin Other (See Comments)     Other reaction(s): renal insuff  Other reaction(s): CHF     Past Medical History:   Diagnosis Date    Acquired renal cyst of left kidney     Anemia associated with chronic renal failure     CAD (coronary artery disease)     nonobstructive [facility] 9/14    CHF (congestive heart failure)     Chronic immunosuppression with Prograf and MMF 06/18/2015    Chronic venous insufficiency of lower extremity     CKD (chronic kidney disease) stage 3, GFR 30-59 ml/min     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 RNA NEGATIVE 5/2015, 2021, 2022    History of colon polyps     HPTH (hyperparathyroidism)     Hyperlipidemia     Hypertension associated with stage 3 chronic kidney disease due to type 2 diabetes mellitus     LBBB (left bundle branch block) 12/20/2021    Morbid obesity with BMI of 45.0-49.9, adult     Nephrolithiasis 6/7/2013    PCO (posterior capsular opacification), left 03/04/2019    Proteinuria     resolved s/p transplant    S/P kidney transplant     Sleep apnea     Type 2 diabetes, uncontrolled, with retinopathy     Type II diabetes mellitus with renal manifestations       PRN meds    acetaminophen, 650 mg, Q6H PRN  acetaminophen, 650 mg, Q6H PRN  dextrose 50%, 12.5 g, PRN  dextrose 50%, 25 g,  PRN  glucagon (human recombinant), 1 mg, PRN  glucose, 16 g, PRN  glucose, 24 g, PRN  HYDROcodone-acetaminophen, 1 tablet, Q6H PRN  insulin aspart U-100, 0-5 Units, QID (AC + HS) PRN  melatonin, 6 mg, Nightly PRN  naloxone, 0.02 mg, PRN  ondansetron, 4 mg, Q8H PRN  polyethylene glycol, 17 g, Daily PRN  promethazine, 25 mg, Q6H PRN  simethicone, 1 tablet, QID PRN  sodium chloride 0.9%, 3 mL, Q12H PRN      Chart check completed. Will continue plan of care.   PRN medication given for pain.        Orientation: disoriented to, place, time, situation  [place] Coma Scale Score: 14     Lead Monitored: Lead II Rhythm: sinus tachycardia Frequency/Ectopy: PVCs  Cardiac/Telemetry Box Number: [id number]  VTE Core Measure: Pharmacological prophylaxis initiated/maintained Last Bowel Movement: 04/07/25  Diet Minced & Moist (IDDSI Level 5) Consistent Carbohydrate, Renal Non-Dialysis; 2000 Calories (up to 75 gm per meal)  Voiding Characteristics: incontinence  [place] Score: 10  Fall Risk Score: 21  Accucheck []   Freq?      Lines/Drains/Airways       Peripheral Intravenous Line  Duration                  Hemodialysis AV Fistula Right upper arm -- days         Peripheral IV - Single Lumen 04/01/25 1756 20 G Left Antecubital 5 days

## 2025-04-07 NOTE — PLAN OF CARE
Pt refused all OOB/EOB activity and supine TherEx, despite max encouragement from therapist. Educated on the importance of OOB/EOB activity for his recovery. Educated on importance of consistent participation with PT. Educated on importance of TherEx to maintain/regain strength, encouraged to complete supine TherEx (hip flex, hip abd/add, heel slides, quad sets, ankle pumps) throughout the day. Encouraged frequent position changes to reduce the risk of pressure injury. Encouraged to sit up for all meals. Recommending moderate intensity therapy upon d/c.

## 2025-04-07 NOTE — PT/OT/SLP PROGRESS
Occupational Therapy   Treatment    Name: Mitch Whittaker  MRN: [id number]  Admitting Diagnosis:  Cellulitis of left lower extremity       Recommendations:     Discharge Recommendations: Moderate Intensity Therapy  Discharge Equipment Recommendations:  to be determined by next level of care  Barriers to discharge:  None    Assessment:     Mitch Whittaker is a 69 y.o. male with a medical diagnosis of Cellulitis of left lower extremity.  He presents with the following performance deficits affecting function are weakness, impaired endurance, impaired sensation, impaired self care skills, impaired functional mobility, gait instability, impaired balance, impaired cognition, decreased coordination, decreased upper extremity function, decreased lower extremity function, decreased safety awareness, pain, decreased ROM, edema, impaired cardiopulmonary response to activity.     Rehab Prognosis:  Fair; patient would benefit from acute skilled OT services to address these deficits and reach maximum level of function.       Plan:     Patient to be seen 2 x/week to address the above listed problems via self-care/home management, therapeutic activities, therapeutic exercises  Plan of Care Expires: 04/16/25  Plan of Care Reviewed with: patient, daughter    Subjective     Chief Complaint: Lethargy; Pt's daughter reporting pt recently received morphine around ~1pm today.    Patient/Family Comments/goals: get better  Pain/Comfort:  Pain Rating 1: other (see comments) (unrated)  Location - Side 1: Bilateral  Location - Orientation 1: generalized  Location 1: leg  Pain Addressed 1: Pre-medicate for activity, Distraction    Objective:     Communicated with: Nurse and epic chart review prior to session.  Patient found HOB elevated with peripheral IV, telemetry upon OT entry to room.    General Precautions: Standard, fall, special contact    Orthopedic Precautions:N/A  Braces: N/A  Respiratory Status: Room air     AMPAC 6 Click ADL:  9    Treatment & Education:  Pt lethargic and with difficulty maintaining alertness at this time despite max verbal cueing. Pt with minimal verbalization and limited eye contact when briefly opening eyes. Reviewed role of OT in acute setting and benefits of participation. Educated on importance of EOB/OOB activity and calling for A to transfer and meet needs. Encouraged completion of B UE AROM therex throughout the day to tolerance to increase functional strength and activity tolerance. Educated patient on importance of increased tolerance to upright position and direct impact on CV endurance and strength. Patient encouraged to sit up with bed in chair position for a minimum of 2 consecutive hours per day. Educated on turning/repositioning for pressure relief. Patient and daughter stated understanding and in agreement with POC.     Patient left HOB elevated with all lines intact, call button in reach, and daughter present    GOALS:   Multidisciplinary Problems       Occupational Therapy Goals          Problem: Occupational Therapy    Goal Priority Disciplines Outcome Interventions   Occupational Therapy Goal     OT, PT/OT Progressing    Description: O.T. GOALS TO BE MET 4-16-25  PT WILL TOLERATE 1 SET X 20 REPS B UE ROM EXERCISE  MOD A WITH SUPINE<SIT TRANSFERS  MAX A WITH BSC TRANSFERS  MIN A WITH ROLLING L<>R                       DME Justifications:  TBD    Time Tracking:     OT Date of Treatment: 04/07/25  OT Start Time: 1450  OT Stop Time: 1458  OT Total Time (min): 8 min    Billable Minutes:Therapeutic Activity 8    OT/ANNIA: OT     Number of ANNIA visits since last OT visit: 0  Tena Miranda OT     4/7/2025

## 2025-04-07 NOTE — ASSESSMENT & PLAN NOTE
Wife reports 2 day history of worsening confusion from patient's baseline, suspect hospital delirium versus medication induced as patient was recently on cefepime.   Cefepime discontinued on 0 4/0 4   Discontinue IV morphine   Minimize sedating meds   Trial Seroquel q.h.s. x1 dose for possible delirium   Obtain ammonia, B12, TSH, folate with a.m. labs to round out metabolic workup   Repeat UA

## 2025-04-07 NOTE — PT/OT/SLP PROGRESS
Physical Therapy Treatment    Patient Name:  Mitch Whittaker   MRN:  [id number]    Recommendations:     Discharge Recommendations: Moderate Intensity Therapy  Discharge Equipment Recommendations: to be determined by next level of care  Barriers to discharge: None    Assessment:     Mitch Whittaker is a 69 y.o. male admitted with a medical diagnosis of Cellulitis of left lower extremity.  He presents with the following impairments/functional limitations: weakness, impaired endurance, impaired functional mobility, gait instability, pain, impaired balance, decreased safety awareness, decreased lower extremity function, impaired cognition, decreased ROM, decreased coordination, edema, impaired cardiopulmonary response to activity, decreased upper extremity function.    Rehab Prognosis: Fair; patient would benefit from acute skilled PT services to address these deficits and reach maximum level of function.    Recent Surgery: * No surgery found *      Plan:     During this hospitalization, patient to be seen 3 x/week to address the identified rehab impairments via gait training, therapeutic activities, therapeutic exercises, neuromuscular re-education and progress toward the following goals:    Plan of Care Expires:  04/16/25    Subjective     Chief Complaint: Pt lethargic at this time, decreased responsiveness to cuing, refused all PT intervention at this time.   Patient/Family Comments/goals: none stated  Pain/Comfort:  Pain Rating 1: 0/10  Pain Addressed 1: Pre-medicate for activity      Objective:     Communicated with epic chart review prior to session.  Patient found HOB elevated with peripheral IV, telemetry, pressure relief boots upon PT entry to room.     General Precautions: Standard, fall, special contact  Orthopedic Precautions: N/A  Braces: N/A  Respiratory Status: Room air     Functional Mobility:  Pt refused all mobility at this time.       AM-PAC 6 CLICK MOBILITY  Turning over in bed (including adjusting  "bedclothes, sheets and blankets)?: 1  Sitting down on and standing up from a chair with arms (e.g., wheelchair, bedside commode, etc.): 1  Moving from lying on back to sitting on the side of the bed?: 1  Moving to and from a bed to a chair (including a wheelchair)?: 1  Need to walk in hospital room?: 1  Climbing 3-5 steps with a railing?: 1  Basic Mobility Total Score: 6       Treatment & Education:  Reviewed role of PT in acute care and POC. Pt refused all OOB/EOB activity and supine TherEx, despite max encouragement from therapist. Educated on the importance of OOB/EOB activity for his recovery. Educated on importance of consistent participation with PT. Educated on importance of TherEx to maintain/regain strength, encouraged to complete supine TherEx (hip flex, hip abd/add, heel slides, quad sets, ankle pumps) throughout the day. Encouraged frequent position changes to reduce the risk of pressure injury. Encouraged to sit up for all meals. Reviewed "call don't fall" policy and increased risk of falling due to weakness, instructed to utilize call bell for assistance with all transfers. Pt agreeable to all requests.    Patient left HOB elevated with all lines intact, call button in reach, bed alarm on, and daughter present.    GOALS:   Multidisciplinary Problems       Physical Therapy Goals          Problem: Physical Therapy    Goal Priority Disciplines Outcome Interventions   Physical Therapy Goal     PT, PT/OT Not Progressing    Description: Pt will perform bed mobility with mod A in order to participate in EOB activity.  Pt will perform transfers with mod A in order to participate in OOB activity.  Pt will ambulate 50 ft mod I with LRAD in order to participate in daily tasks.   Pt will tolerate sitting OOB in chair x 2-4 hrs in order to participate in daily tasks.                        Time Tracking:     PT Received On: 04/07/25  PT Start Time: 1448     PT Stop Time: 1456  PT Total Time (min): 8 min "     Billable Minutes: Therapeutic Activity 8min    Treatment Type: Treatment  PT/PTA: PT     Number of PTA visits since last PT visit: 0     04/07/2025

## 2025-04-07 NOTE — ASSESSMENT & PLAN NOTE
Patient's FSGs are controlled on current medication regimen.  Last A1c reviewed-   Lab Results   Component Value Date    HGBA1C 5.7 (H) 12/17/2024     Most recent fingerstick glucose reviewed-   Recent Labs   Lab 04/06/25  1720 04/06/25  2142 04/07/25  0701 04/07/25  1114   POCTGLUCOSE 211* 213* 132* 181*     Current correctional scale  Medium  Maintain anti-hyperglycemic dose as follows-   Antihyperglycemics (From admission, onward)      Start     Stop Route Frequency Ordered    04/03/25 1245  insulin glargine U-100 (Lantus) pen 10 Units         -- SubQ 2 times daily 04/03/25 1136    04/01/25 2134  insulin aspart U-100 pen 0-5 Units         -- SubQ Before meals & nightly PRN 04/01/25 2035          Hold Oral hypoglycemics while patient is in the hospital.

## 2025-04-07 NOTE — ASSESSMENT & PLAN NOTE
"Patient has Combined Systolic and Diastolic heart failure that is Chronic. On presentation their CHF was well compensated. Most recent BNP and echo results are listed below.  No results for input(s): "BNP" in the last 72 hours.  Latest ECHO  Results for orders placed during the hospital encounter of 03/13/25    Echo Saline Bubble? No    Interpretation Summary    Left Ventricle: The left ventricle is normal in size. Mildly increased ventricular mass. Mildly increased wall thickness. There is mild concentric hypertrophy. Normal wall motion. Septal motion is consistent with bundle branch block. There is moderately reduced systolic function with a visually estimated ejection fraction of 35 - 40%. Grade I diastolic dysfunction.    Right Ventricle: The right ventricle is normal in size. Wall thickness is normal. Systolic function is normal.    Left Atrium: Mildly dilated    Aorta: Aortic annulus is mildly dilated measuring 4.01 cm. Ascending aorta is normal measuring 3.69 cm.    Pulmonary Artery: The estimated pulmonary artery systolic pressure is 24 mmHg.    IVC/SVC: Normal venous pressure at 3 mmHg.    Current Heart Failure Medications  metoprolol succinate (TOPROL-XL) 24 hr tablet 25 mg, Daily, Oral  furosemide tablet 40 mg, Daily, Oral    Plan  -Monitor strict I&Os and daily weights.    -Place on telemetry  -Low sodium diet  -Place on fluid restriction of 1.5 L.   -Cardiology has not been consulted  -The patient's volume status is at their baseline  -Continue home Lasix, , beta-blocker, Entresto on hold for now    "

## 2025-04-07 NOTE — PLAN OF CARE
~ NAEON.     ~ AOx1; unable to reorient to time, place, or situation. Able to verbalize needs & follow commands but refuses.       ~ POC reviewed with daughter. Interventions implemented as appropriate.      ~ VS stable.      ~ NSR-ST on tele-monitor, box # [id number].    ~ On room air.    ~ LAC 20g PIV. Flushes w/out difficutly; saline locked. Dsg cdi. Integrity maintained.      ~ Receiving PO abx. No adverse reactions noted.    ~ Receiving PO diuresis. Strict i/o's.    ~ No new skin issues. Discoloration noted to BLE. Edema to L-knee.    ~ Minced & moist, consistent carbohydrate, renal diet. Tolerating well. No n/v/d or any other GI complaints. Per day nurse, he will only eat when family feeds him.    ~ B/B incontinence. LBM 4/07.      ~ When he was cooperating, he denied having any pain.      ~ Generalized weakness moderately impaired. Assist x 2. Bed bound. Activity ad madhav.     ~ CBG ac/hs; coverage required.    ~ On Eliquis. SCDs contraindicated d/t skin issues to BLE. Heel boots refused.      ~ Turn q2h. Frequent position changes encouraged. Educated pt on skin integrity & breakdown prevention, as well as s/sx of pressure injury;  no evidence of understanding. Size-wise bed in use.    ~ NADN. Resting quietly in bed.     ~ Free of falls. Hourly rounding complete.     ~ All safety measures remain in place. SR up x3; bed low & locked. Bed alarm on sensitive; educated on increased risk for falls; no evidence of learning. Call light w/in reach.     ~ Hourly monitoring continues throughout shift.    ~ Pt remains stable & w/in expected parameters w/ no further changes noted at this time. Will continue to observe & report any changes to appropriate provider.    ~ No family at bedside at this time. Usually daughter is & actively participating in poc.    ~ Chart check complete.

## 2025-04-07 NOTE — ASSESSMENT & PLAN NOTE
Started on cefepime on admission 4/1/25  De-escalated to Keflex 4/5/25  Cellulitis appears to be improving   Continue p.o. Keflex   Continue wound care per Wound Care recommendations

## 2025-04-07 NOTE — PROGRESS NOTES
[facility]  Progress Note    Patient Name: Mitch Whittaker  MRN: [id number]  Patient Class: IP- Inpatient   Admission Date: 4/1/2025  Length of Stay: 6 days  Attending Physician: Zeenat Cordova MD  Primary Care Provider: Valery Caal MD        Subjective     Principal Problem:Cellulitis of left lower extremity        HPI:  Mitch Whittaker is a 69 y.o. 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), Diabetic retinopathy, DM (diabetes mellitus), type 2 with complications (1994), Edema, End stage kidney disease, Gallbladder polyp, Heart failure, diastolic, due to HTN, Hemodialysis status, 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 who presented to the ED for further evaluation of worsening left leg pain and swelling which began last night.  Patient reports being bed-bound but suffers from chronic venous stasis and recurrent skin infections.  Patient reported symptoms began acutely with no known alleviating or aggravating factors noted with a associated symptoms including nonproductive cough in addition to those noted above.  He denied  endorsing any recent trauma to the affected area and reported being in his usual state of health prior to onset of symptoms.  All other review of systems negative except as noted above.  Initial workup in the ED revealed patient met SIRS criteria for sepsis with concerns for underlying cellulitis and was initiated on cefepime.  Remaining laboratory workup revealed H/H 9.6/31.8, potassium 2.8, creatinine/GFR 1.9/37, blood glucose 61, magnesium 1.1, troponin 0.152, lactic acid within normal limits, procalcitonin 1.60, chest x-ray negative for acute findings.  Patient admitted to Hospital Medicine inpatient for continued medical management.    PCP: Valery Caal      Overview/Hospital Course:    4/2/25  Patient admitted or LLE cellulitis, continue cefepime  Noted bilateral chronic venous stasis, Wound Care consulted  Reports BLE weakness x 2 months, difficulty ambulating  Replete K and Mg  PT/OT consult    4/3/25  Patient with dysphagia and early satiety, ST consulted  Hx of Tacrolimus induced GI toxicity  Upper GI FL pending  Patient with renal transplant, on tacrolimus and cellcept  Restart tacrolimus, consult Nephrology for assitance in transplant management  Continue cefepime for LLE celulitis  PT/OT with reccs for JOSE, consult SW for SNF placement    4/4/25  NAEON, patient resting in bed, no complaints  Blood cultures NGTD, check procal, possibly de-escalate cefepime 1-2 days  On IDDSI level 5 diet per  rec, appreciate assistance, will plan for outpatient GI referral  Stop famotidine, start Protonix, plan to continue on discharge    4/5/25  NAEON, AM labs pending  LLE cellulitis improving  Cr 1.8, Nephrology following, on Tacrolimus  Family at bedside, updated    4/6/25  NAEON, Cr 1.8 --> 2.3, tacrolimus level pending  Continue Keflex for cellulitis   Patient reports fatigue, generalized weakness  Consult PT/OT, may benefit from SNF/rehab    04/07/2025   -patient is seen and examined with wife and RN at  bedside.  Per wife, patient has been more confused over the weekend, reports pain all over and refusing some care, stating he feels like he is going to die.  On evaluation, he is oriented to self and place but not situation.  Refusing most of exam.  Question delirium secondary to hospitalization versus due to recent use of cefepime.    Interval History:  See hospital course    Review of Systems  Objective:     Vital Signs (Most Recent):  Temp: 97.9 °F (36.6 °C) (04/07/25 1229)  Pulse: 107 (04/07/25 1513)  Resp: 18 (04/07/25 1348)  BP: 121/71 (04/07/25 1229)  SpO2: 97 % (04/07/25 1513) Vital Signs (24h Range):  Temp:  [97.7 °F (36.5 °C)-98.7 °F (37.1 °C)] 97.9 °F (36.6 °C)  Pulse:  [] 107  Resp:  [16-18] 18  SpO2:  [92 %-98 %] 97 %  BP: (121-145)/(70-72) 121/71     Weight: 103.9 kg (229 lb 0.9 oz)  Body mass index is 31.07 kg/m².  No intake or output data in the 24 hours ending 04/07/25 1605      Physical Exam  Vitals and nursing note reviewed. Exam conducted with a chaperone present.   Constitutional:       General: He is not in acute distress.     Appearance: He is ill-appearing.   Cardiovascular:      Rate and Rhythm: Normal rate and regular rhythm.      Heart sounds: No murmur heard.  Pulmonary:      Effort: Pulmonary effort is normal.      Breath sounds: Normal breath sounds. No wheezing or rales.   Abdominal:      General: Bowel sounds are normal. There is no distension.      Palpations: Abdomen is soft.      Tenderness: There is no abdominal tenderness.   Musculoskeletal:      Right lower leg: Edema present.      Left lower leg: Edema present.   Skin:     Comments: Faint erythema noted to bilateral lower extremities, chronic wounds   Neurological:      Mental Status: He is alert.      Comments: Oriented to self, place but not situation or time, follow some commands but quickly becomes agitated when asked questions.               Significant Labs: All pertinent labs within the past 24 hours have been  reviewed.    Significant Imaging: I have reviewed all pertinent imaging results/findings within the past 24 hours.      Assessment & Plan  Cellulitis of left lower extremity  Started on cefepime on admission 4/1/25  De-escalated to Keflex 4/5/25  Cellulitis appears to be improving   Continue p.o. Keflex   Continue wound care per Wound Care recommendations  Chronic venous insufficiency of lower extremity  Wound care  Sepsis  This patient does have evidence of infective focus  My overall impression is sepsis.  Source: Skin and Soft Tissue (location left leg cellulitis, gout of left knee)  Antibiotics given-   Antibiotics (72h ago, onward)      Start     Stop Route Frequency Ordered    04/05/25 2200  cephALEXin capsule 500 mg         -- Oral Every 8 hours 04/05/25 1654          Continue p.o. Keflex   Obtain uric acid with a.m. labs to rule out gout    Acute encephalopathy  Wife reports 2 day history of worsening confusion from patient's baseline, suspect hospital delirium versus medication induced as patient was recently on cefepime.   Cefepime discontinued on 0 4/0 4   Discontinue IV morphine   Minimize sedating meds   Trial Seroquel q.h.s. x1 dose for possible delirium   Obtain ammonia, B12, TSH, folate with a.m. labs to round out metabolic workup   Repeat UA      Weakness of both lower extremities  Patient reported worsening bilateral lower extremity weakness times 2-3 months in which he reported symptoms began acutely after his right leg gave out going to the restroom.  Patient denies endorsing any back pain, experiencing ground level fall/trauma, but does report fecal incontinence when urinating.  Patient has since been bed-bound in his not been seen/evaluated by a physician for these symptoms.  PT/OT evaluated and recommended moderate intensity therapy on discharge  Social work consulted and following  Chronic combined systolic and diastolic congestive heart failure  Hypertension  Patient has Combined Systolic  "and Diastolic heart failure that is Chronic. On presentation their CHF was well compensated. Most recent BNP and echo results are listed below.  No results for input(s): "BNP" in the last 72 hours.  Latest ECHO  Results for orders placed during the hospital encounter of 03/13/25    Echo Saline Bubble? No    Interpretation Summary    Left Ventricle: The left ventricle is normal in size. Mildly increased ventricular mass. Mildly increased wall thickness. There is mild concentric hypertrophy. Normal wall motion. Septal motion is consistent with bundle branch block. There is moderately reduced systolic function with a visually estimated ejection fraction of 35 - 40%. Grade I diastolic dysfunction.    Right Ventricle: The right ventricle is normal in size. Wall thickness is normal. Systolic function is normal.    Left Atrium: Mildly dilated    Aorta: Aortic annulus is mildly dilated measuring 4.01 cm. Ascending aorta is normal measuring 3.69 cm.    Pulmonary Artery: The estimated pulmonary artery systolic pressure is 24 mmHg.    IVC/SVC: Normal venous pressure at 3 mmHg.    Current Heart Failure Medications  metoprolol succinate (TOPROL-XL) 24 hr tablet 25 mg, Daily, Oral  furosemide tablet 40 mg, Daily, Oral    Plan  -Monitor strict I&Os and daily weights.    -Place on telemetry  -Low sodium diet  -Place on fluid restriction of 1.5 L.   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  Last A1c reviewed-   Lab Results   Component Value Date    HGBA1C 5.7 (H) 12/17/2024     Most recent fingerstick glucose reviewed-   Recent Labs   Lab 04/06/25  1720 04/06/25  2142 04/07/25  0701 04/07/25  1114   POCTGLUCOSE 211* 213* 132* 181*     Current " correctional scale  Medium  Maintain anti-hyperglycemic dose as follows-   Antihyperglycemics (From admission, onward)      Start     Stop Route Frequency Ordered    04/03/25 1245  insulin glargine U-100 (Lantus) pen 10 Units         -- SubQ 2 times daily 04/03/25 1136    04/01/25 2134  insulin aspart U-100 pen 0-5 Units         -- SubQ Before meals & nightly PRN 04/01/25 2035          Hold Oral hypoglycemics while patient is in the hospital.      Anemia, chronic disease  Anemia is likely due to chronic disease due to Chronic Kidney Disease. Most recent hemoglobin and hematocrit are listed below.  Recent Labs     04/05/25  1109 04/07/25  0449   HGB 7.9* 7.7*   HCT 26.7* 26.3*     Plan  -Monitor serial CBC: Daily  -Transfuse PRBC if patient becomes hemodynamically unstable, symptomatic or H/H drops below 7/21.  -Patient has not received any PRBC transfusions to date  -Patient's anemia is currently stable    Coronary artery disease of native artery of native heart with stable angina pectoris  Patient with known CAD, which is controlled.  EKG negative for signs of acute ischemia.  Troponin elevated at 0.15 to likely secondary to demand ischemia in setting of sepsis and CHF. Will continue  home medications  and monitor for S/Sx of angina/ACS. Continue to monitor on telemetry.    Chronic immunosuppression with Prograf, MMF and prednisone  Patient currently on Prograf, CellCept, and mycophenolate.  Plan:  -continue Prograf and CellCept  -holding mycophenolate  Nephrology consulted for management of immunosuppressant medications   Monitor Prograf level per renal    History of DVT (deep vein thrombosis)  Currently on Eliquis outpatient.  Plan:  -continue home medication  -monitor H/H    Obstructive sleep apnea  Currently on CPAP outpatient.  Plan:  -continue CPAP q.h.s.    VTE Risk Mitigation (From admission, onward)           Ordered     apixaban tablet 5 mg  2 times daily         04/03/25 1141     Reason for No  Pharmacological VTE Prophylaxis  Once        Question:  Reasons:  Answer:  Already adequately anticoagulated on oral Anticoagulants    04/01/25 2035     IP VTE HIGH RISK PATIENT  Once         04/01/25 2035     Place sequential compression device  Until discontinued         04/01/25 2035                    Discharge Planning   GRETEL: 4/10/2025     Code Status: Full Code   Medical Readiness for Discharge Date:   Discharge Plan A: Home Health, Home with family        Please place Justification for DME        Zeenat Cordova MD  Department of Hospital Medicine   [facility] - Telemetry ([facility])

## 2025-04-07 NOTE — ASSESSMENT & PLAN NOTE
[facility]  Plan:  -continue home medication  -monitor H/H

## 2025-04-08 LAB
ABSOLUTE NEUTROPHIL MANUAL (OHS): 0.5 K/UL
ALBUMIN SERPL BCP-MCNC: 1.7 G/DL (ref 3.5–5.2)
ANION GAP (OHS): 12 MMOL/L (ref 8–16)
ANISOCYTOSIS BLD QL SMEAR: SLIGHT
BASOPHILS NFR BLD MANUAL: 1 %
BUN SERPL-MCNC: 52 MG/DL (ref 8–23)
CALCIUM SERPL-MCNC: 9.9 MG/DL (ref 8.7–10.5)
CHLORIDE SERPL-SCNC: 103 MMOL/L (ref 95–110)
CO2 SERPL-SCNC: 22 MMOL/L (ref 23–29)
CREAT SERPL-MCNC: 2.5 MG/DL (ref 0.5–1.4)
EOSINOPHIL NFR BLD MANUAL: 2 % (ref 0–8)
ERYTHROCYTE [DISTWIDTH] IN BLOOD BY AUTOMATED COUNT: 17.7 % (ref 11.5–14.5)
GFR SERPLBLD CREATININE-BSD FMLA CKD-EPI: 27 ML/MIN/1.73/M2
GLUCOSE SERPL-MCNC: 108 MG/DL (ref 70–110)
HCT VFR BLD AUTO: 31.4 % (ref 40–54)
HGB BLD-MCNC: 9 GM/DL (ref 14–18)
LYMPHOCYTES NFR BLD MANUAL: 53 % (ref 18–48)
MCH RBC QN AUTO: 24.2 PG (ref 27–31)
MCHC RBC AUTO-ENTMCNC: 28.7 G/DL (ref 32–36)
MCV RBC AUTO: 84 FL (ref 82–98)
METAMYELOCYTES NFR BLD MANUAL: 1 %
MONOCYTES NFR BLD MANUAL: 19 % (ref 4–15)
NEUTROPHILS NFR BLD MANUAL: 19 % (ref 38–73)
NEUTS BAND NFR BLD MANUAL: 4 %
NUCLEATED RBC (/100WBC) (OHS): 1 /100 WBC
OVALOCYTES BLD QL SMEAR: ABNORMAL
PHOSPHATE SERPL-MCNC: 3.3 MG/DL (ref 2.7–4.5)
PLATELET # BLD AUTO: 225 K/UL (ref 150–450)
PLATELET BLD QL SMEAR: ABNORMAL
PMV BLD AUTO: 11.7 FL (ref 9.2–12.9)
POCT GLUCOSE: 107 MG/DL (ref 70–110)
POCT GLUCOSE: 122 MG/DL (ref 70–110)
POCT GLUCOSE: 129 MG/DL (ref 70–110)
POIKILOCYTOSIS BLD QL SMEAR: SLIGHT
POTASSIUM SERPL-SCNC: 3.8 MMOL/L (ref 3.5–5.1)
PROMYELOCYTES NFR BLD MANUAL: 1 %
RBC # BLD AUTO: 3.72 M/UL (ref 4.6–6.2)
SODIUM SERPL-SCNC: 137 MMOL/L (ref 136–145)
WBC # BLD AUTO: 2.13 K/UL (ref 3.9–12.7)

## 2025-04-08 PROCEDURE — 63600175 PHARM REV CODE 636 W HCPCS: Performed by: HOSPITALIST

## 2025-04-08 PROCEDURE — 25000003 PHARM REV CODE 250: Performed by: HOSPITALIST

## 2025-04-08 PROCEDURE — 99233 SBSQ HOSP IP/OBS HIGH 50: CPT | Mod: ,,, | Performed by: INTERNAL MEDICINE

## 2025-04-08 PROCEDURE — 25000003 PHARM REV CODE 250: Performed by: INTERNAL MEDICINE

## 2025-04-08 PROCEDURE — 21400001 HC TELEMETRY ROOM

## 2025-04-08 PROCEDURE — 63600175 PHARM REV CODE 636 W HCPCS: Performed by: INTERNAL MEDICINE

## 2025-04-08 PROCEDURE — 93005 ELECTROCARDIOGRAM TRACING: CPT

## 2025-04-08 PROCEDURE — 36415 COLL VENOUS BLD VENIPUNCTURE: CPT | Performed by: INTERNAL MEDICINE

## 2025-04-08 PROCEDURE — 80069 RENAL FUNCTION PANEL: CPT | Performed by: INTERNAL MEDICINE

## 2025-04-08 PROCEDURE — 85007 BL SMEAR W/DIFF WBC COUNT: CPT | Performed by: INTERNAL MEDICINE

## 2025-04-08 PROCEDURE — 93010 ELECTROCARDIOGRAM REPORT: CPT | Mod: ,,, | Performed by: INTERNAL MEDICINE

## 2025-04-08 RX ORDER — SODIUM CHLORIDE 9 MG/ML
INJECTION, SOLUTION INTRAVENOUS CONTINUOUS
Status: ACTIVE | OUTPATIENT
Start: 2025-04-08 | End: 2025-04-10

## 2025-04-08 RX ORDER — TACROLIMUS 1 MG/1
4 CAPSULE ORAL EVERY MORNING
Status: DISCONTINUED | OUTPATIENT
Start: 2025-04-08 | End: 2025-04-10

## 2025-04-08 RX ORDER — TACROLIMUS 1 MG/1
3 CAPSULE ORAL EVERY EVENING
Status: DISCONTINUED | OUTPATIENT
Start: 2025-04-08 | End: 2025-04-11 | Stop reason: HOSPADM

## 2025-04-08 RX ADMIN — TACROLIMUS 4 MG: 1 CAPSULE ORAL at 09:04

## 2025-04-08 RX ADMIN — CEPHALEXIN 500 MG: 500 CAPSULE ORAL at 04:04

## 2025-04-08 RX ADMIN — ASPIRIN 81 MG: 81 TABLET, COATED ORAL at 09:04

## 2025-04-08 RX ADMIN — SODIUM BICARBONATE 650 MG: 650 TABLET ORAL at 09:04

## 2025-04-08 RX ADMIN — FERROUS SULFATE TAB 325 MG (65 MG ELEMENTAL FE) 1 EACH: 325 (65 FE) TAB at 09:04

## 2025-04-08 RX ADMIN — CALCITRIOL CAPSULES 0.25 MCG 0.25 MCG: 0.25 CAPSULE ORAL at 09:04

## 2025-04-08 RX ADMIN — FUROSEMIDE 40 MG: 40 TABLET ORAL at 09:04

## 2025-04-08 RX ADMIN — PANTOPRAZOLE SODIUM 40 MG: 40 TABLET, DELAYED RELEASE ORAL at 09:04

## 2025-04-08 RX ADMIN — SODIUM CHLORIDE: 9 INJECTION, SOLUTION INTRAVENOUS at 04:04

## 2025-04-08 RX ADMIN — APIXABAN 5 MG: 2.5 TABLET, FILM COATED ORAL at 09:04

## 2025-04-08 RX ADMIN — TACROLIMUS 3 MG: 1 CAPSULE ORAL at 07:04

## 2025-04-08 RX ADMIN — METOPROLOL SUCCINATE 25 MG: 25 TABLET, EXTENDED RELEASE ORAL at 09:04

## 2025-04-08 RX ADMIN — PREDNISONE 5 MG: 5 TABLET ORAL at 09:04

## 2025-04-08 RX ADMIN — INSULIN GLARGINE 10 UNITS: 100 INJECTION, SOLUTION SUBCUTANEOUS at 09:04

## 2025-04-08 RX ADMIN — ALLOPURINOL 50 MG: 300 TABLET ORAL at 09:04

## 2025-04-08 NOTE — ASSESSMENT & PLAN NOTE
Patient's FSGs are controlled on current medication regimen.  Last A1c reviewed-   Lab Results   Component Value Date    HGBA1C 5.7 (H) 12/17/2024     Most recent fingerstick glucose reviewed-   Recent Labs   Lab 04/07/25  1114 04/07/25  1710 04/07/25  2040 04/08/25  0635   POCTGLUCOSE 181* 184* 154* 122*     Current correctional scale  Medium  Maintain anti-hyperglycemic dose as follows-   Antihyperglycemics (From admission, onward)      Start     Stop Route Frequency Ordered    04/03/25 1245  insulin glargine U-100 (Lantus) pen 10 Units         -- SubQ 2 times daily 04/03/25 1136    04/01/25 2134  insulin aspart U-100 pen 0-5 Units         -- SubQ Before meals & nightly PRN 04/01/25 2035          Hold Oral hypoglycemics while patient is in the hospital.

## 2025-04-08 NOTE — PROGRESS NOTES
[facility] Medicine  Progress Note    Patient Name: Mitch Whittaker  MRN: [id number]  Patient Class: IP- Inpatient   Admission Date: 4/1/2025  Length of Stay: 7 days  Attending Physician: Zeenat Cordova MD  Primary Care Provider: Valery Caal MD        Subjective     Principal Problem:Cellulitis of left lower extremity        HPI:  Mitch Whittaker is a 69 y.o. 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), Diabetic retinopathy, DM (diabetes mellitus), type 2 with complications (1994), Edema, End stage kidney disease, Gallbladder polyp, Heart failure, diastolic, due to HTN, Hemodialysis status, 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 who presented to the ED for further evaluation of worsening left leg pain and swelling which began last night.  Patient reports being bed-bound but suffers from chronic venous stasis and recurrent skin infections.  Patient reported symptoms began acutely with no known alleviating or aggravating factors noted with a associated symptoms including nonproductive cough in addition to those noted above.  He denied  endorsing any recent trauma to the affected area and reported being in his usual state of health prior to onset of symptoms.  All other review of systems negative except as noted above.  Initial workup in the ED revealed patient met SIRS criteria for sepsis with concerns for underlying cellulitis and was initiated on cefepime.  Remaining laboratory workup revealed H/H 9.6/31.8, potassium 2.8, creatinine/GFR 1.9/37, blood glucose 61, magnesium 1.1, troponin 0.152, lactic acid within normal limits, procalcitonin 1.60, chest x-ray negative for acute findings.  Patient admitted to Hospital Medicine inpatient for continued medical management.    PCP: Valery Caal      Overview/Hospital Course:    4/2/25  Patient admitted or LLE cellulitis, continue cefepime  Noted bilateral chronic venous stasis, Wound Care consulted  Reports BLE weakness x 2 months, difficulty ambulating  Replete K and Mg  PT/OT consult    4/3/25  Patient with dysphagia and early satiety, ST consulted  Hx of Tacrolimus induced GI toxicity  Upper GI FL pending  Patient with renal transplant, on tacrolimus and cellcept  Restart tacrolimus, consult Nephrology for assitance in transplant management  Continue cefepime for LLE celulitis  PT/OT with reccs for JOSE, consult SW for SNF placement    4/4/25  NAEON, patient resting in bed, no complaints  Blood cultures NGTD, check procal, possibly de-escalate cefepime 1-2 days  On IDDSI level 5 diet per  rec, appreciate assistance, will plan for outpatient GI referral  Stop famotidine, start Protonix, plan to continue on discharge    4/5/25  NAEON, AM labs pending  LLE cellulitis improving  Cr 1.8, Nephrology following, on Tacrolimus  Family at bedside, updated    4/6/25  NAEON, Cr 1.8 --> 2.3, tacrolimus level pending  Continue Keflex for cellulitis   Patient reports fatigue, generalized weakness  Consult PT/OT, may benefit from SNF/rehab    04/07/2025   -patient is seen and examined with wife and RN at  bedside.  Per wife, patient has been more confused over the weekend, reports pain all over and refusing some care, stating he feels like he is going to die.  On evaluation, he is oriented to self and place but not situation.  Refusing most of exam.  Question delirium secondary to hospitalization versus due to recent use of cefepime.    04/08/2025  No acute events overnight.  Patient with sinus tachycardia on tele monitor, T-max 100.4°.  Patient is seen and examined with nurse tech at bedside, no family present during morning rounds.  Patient awake but does not answer questions, when extremities are palpated he states please don't do that ma'a.m.  Moving all extremities spontaneously but does not follow commands.  We will obtain MRI brain further evaluate altered mental status.      Interval History:  See hospital course    Review of Systems  Objective:     Vital Signs (Most Recent):  Temp: (!) 100.4 °F (38 °C) (04/08/25 0827)  Pulse: (!) 120 (04/08/25 0827)  Resp: 18 (04/08/25 0827)  BP: 119/68 (04/08/25 0827)  SpO2: 96 % (04/08/25 0827) Vital Signs (24h Range):  Temp:  [97.5 °F (36.4 °C)-100.4 °F (38 °C)] 100.4 °F (38 °C)  Pulse:  [] 120  Resp:  [16-20] 18  SpO2:  [96 %-100 %] 96 %  BP: (115-148)/(55-75) 119/68     Weight: 103.9 kg (229 lb 0.9 oz)  Body mass index is 31.07 kg/m².    Intake/Output Summary (Last 24 hours) at 4/8/2025 1032  Last data filed at 4/8/2025 0621  Gross per 24 hour   Intake --   Output 500 ml   Net -500 ml         Physical Exam  Vitals reviewed.   Constitutional:       General: He is not in acute distress.     Appearance: He is ill-appearing.   Cardiovascular:      Rate and Rhythm: Regular rhythm. Tachycardia present.      Heart sounds: No murmur heard.  Pulmonary:      Effort: Pulmonary effort is normal.      Breath sounds: Normal breath sounds. No wheezing, rhonchi or rales.   Abdominal:      General: Bowel sounds are normal. There is no distension.      Palpations: Abdomen is  "soft.      Tenderness: There is no abdominal tenderness. There is no guarding or rebound.   Musculoskeletal:      Right lower leg: Edema present.      Left lower leg: Edema present.      Comments: Stable erythema to bilateral lower extremities, trace edema   Neurological:      Mental Status: He is alert.      Comments: Resting with eyes open, moving all extremities answers "hmm" to all questions, states do not do that" when extremities are palpated               Significant Labs: All pertinent labs within the past 24 hours have been reviewed.    Significant Imaging: I have reviewed all pertinent imaging results/findings within the past 24 hours.      Assessment & Plan  Cellulitis of left lower extremity  Started on cefepime on admission 4/1/25  De-escalated to Keflex 4/5/25  Cellulitis appears to be improving   Continue p.o. Keflex   Continue wound care per Wound Care recommendations  Chronic venous insufficiency of lower extremity  Wound care  Sepsis  This patient does have evidence of infective focus  My overall impression is sepsis.  Source: Skin and Soft Tissue (location left leg cellulitis, gout of left knee)  Antibiotics given-   Antibiotics (72h ago, onward)      Start     Stop Route Frequency Ordered    04/05/25 2200  cephALEXin capsule 500 mg         -- Oral Every 8 hours 04/05/25 1654          Continue p.o. Keflex   Uric acid elevated, we will discuss with Nephrology regarding treatment of gout    Acute encephalopathy  Wife reports 2 day history of worsening confusion from patient's baseline, suspect hospital delirium versus medication induced as patient was recently on cefepime.   Cefepime discontinued on 0 4/0 4   Minimize sedating meds   Continue Seroquel q.h.s. for possible delirium   Repeat UA negative for UTI, B12 elevated.  TSH, ammonia within normal limits.  Folic acid pending.  We will obtain MRI brain for further evaluation    Weakness of both lower extremities  Patient reported worsening " "bilateral lower extremity weakness times 2-3 months in which he reported symptoms began acutely after his right leg gave out going to the restroom.  Patient denies endorsing any back pain, experiencing ground level fall/trauma, but does report fecal incontinence when urinating.  Patient has since been bed-bound in his not been seen/evaluated by a physician for these symptoms.  PT/OT evaluated and recommended moderate intensity therapy on discharge but family not interested in pursuing SNF placement at this time  Social work consulted and following  Chronic combined systolic and diastolic congestive heart failure  Hypertension  Patient has Combined Systolic and Diastolic heart failure that is Chronic. On presentation their CHF was well compensated. Most recent BNP and echo results are listed below.  No results for input(s): "BNP" in the last 72 hours.  Latest ECHO  Results for orders placed during the hospital encounter of 03/13/25    Echo Saline Bubble? No    Interpretation Summary    Left Ventricle: The left ventricle is normal in size. Mildly increased ventricular mass. Mildly increased wall thickness. There is mild concentric hypertrophy. Normal wall motion. Septal motion is consistent with bundle branch block. There is moderately reduced systolic function with a visually estimated ejection fraction of 35 - 40%. Grade I diastolic dysfunction.    Right Ventricle: The right ventricle is normal in size. Wall thickness is normal. Systolic function is normal.    Left Atrium: Mildly dilated    Aorta: Aortic annulus is mildly dilated measuring 4.01 cm. Ascending aorta is normal measuring 3.69 cm.    Pulmonary Artery: The estimated pulmonary artery systolic pressure is 24 mmHg.    IVC/SVC: Normal venous pressure at 3 mmHg.    Current Heart Failure Medications  metoprolol succinate (TOPROL-XL) 24 hr tablet 25 mg, Daily, Oral  furosemide tablet 40 mg, Daily, Oral    Plan  -Monitor strict I&Os and daily weights.    -Place " on telemetry  -Low sodium diet  -Place on fluid restriction of 1.5 L.   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  Last A1c reviewed-   Lab Results   Component Value Date    HGBA1C 5.7 (H) 12/17/2024     Most recent fingerstick glucose reviewed-   Recent Labs   Lab 04/07/25  1114 04/07/25  1710 04/07/25  2040 04/08/25  0635   POCTGLUCOSE 181* 184* 154* 122*     Current correctional scale  Medium  Maintain anti-hyperglycemic dose as follows-   Antihyperglycemics (From admission, onward)      Start     Stop Route Frequency Ordered    04/03/25 1245  insulin glargine U-100 (Lantus) pen 10 Units         -- SubQ 2 times daily 04/03/25 1136    04/01/25 2134  insulin aspart U-100 pen 0-5 Units         -- SubQ Before meals & nightly PRN 04/01/25 2035          Hold Oral hypoglycemics while patient is in the hospital.      Anemia, chronic disease  Anemia is likely due to chronic disease due to Chronic Kidney Disease. Most recent hemoglobin and hematocrit are listed below.  Recent Labs     04/05/25  1109 04/07/25  0449 04/08/25  0430   HGB 7.9* 7.7* 9.0*   HCT 26.7* 26.3* 31.4*     Plan  -Monitor serial CBC: Daily  -Transfuse PRBC if patient becomes hemodynamically unstable, symptomatic or H/H drops below 7/21.  -Patient has not received any PRBC transfusions to date  -Patient's anemia is currently stable    Coronary artery disease of native artery of native heart with stable angina pectoris  Patient with known CAD, which is controlled.  EKG negative for signs of acute ischemia.  Troponin elevated at 0.15 to likely secondary to demand ischemia in setting of sepsis and CHF. Will continue  home medications  and monitor for S/Sx of angina/ACS.  "Continue to monitor on telemetry.    Chronic immunosuppression with Prograf, MMF and prednisone  Patient currently on Prograf, CellCept, and mycophenolate.  Plan:  -continue Prograf and CellCept  -holding mycophenolate  Nephrology consulted for management of immunosuppressant medications ; defer management  Monitor Prograf level per renal    History of DVT (deep vein thrombosis)  Currently on Eliquis outpatient.  Plan:  -continue home medication  -monitor H/H    Obstructive sleep apnea  Currently on CPAP outpatient.  Plan:  -continue CPAP q.h.s.    Hypomagnesemia  Patient has Abnormal Magnesium: hypomagnesemia. Will continue to monitor electrolytes closely. Will replace the affected electrolytes and repeat labs to be done after interventions completed. The patient's magnesium results have been reviewed and are listed below.  No results for input(s): "MG" in the last 24 hours.      VTE Risk Mitigation (From admission, onward)           Ordered     apixaban tablet 5 mg  2 times daily         04/03/25 1141     Reason for No Pharmacological VTE Prophylaxis  Once        Question:  Reasons:  Answer:  Already adequately anticoagulated on oral Anticoagulants    04/01/25 2035     IP VTE HIGH RISK PATIENT  Once         04/01/25 2035     Place sequential compression device  Until discontinued         04/01/25 2035                    Discharge Planning   GRETEL: 4/10/2025     Code Status: Full Code   Medical Readiness for Discharge Date:   Discharge Plan A: Home Health, Home with family                Please place Justification for DME        Zeenat Cordova MD  Department of Hospital Medicine   [facility] - Telemetry ([facility])    "

## 2025-04-08 NOTE — ASSESSMENT & PLAN NOTE
Patient currently on Prograf, CellCept, and mycophenolate.  Plan:  -continue Prograf and CellCept  -holding mycophenolate  Nephrology consulted for management of immunosuppressant medications ; defer management  Monitor Prograf level per renal

## 2025-04-08 NOTE — PLAN OF CARE
Problem: Skin Injury Risk Increased  Goal: Skin Health and Integrity  Outcome: Progressing     Problem: Adult Inpatient Plan of Care  Goal: Plan of Care Review  Outcome: Progressing  Goal: Patient-Specific Goal (Individualized)  Outcome: Progressing  Goal: Absence of Hospital-Acquired Illness or Injury  Outcome: Progressing  Goal: Optimal Comfort and Wellbeing  Outcome: Progressing  Goal: Readiness for Transition of Care  Outcome: Progressing     Problem: Diabetes Comorbidity  Goal: Blood Glucose Level Within Targeted Range  Outcome: Progressing     Problem: Wound  Goal: Optimal Coping  Outcome: Progressing  Goal: Optimal Functional Ability  Outcome: Progressing  Goal: Absence of Infection Signs and Symptoms  Outcome: Progressing  Goal: Improved Oral Intake  Outcome: Progressing  Goal: Optimal Pain Control and Function  Outcome: Progressing  Goal: Skin Health and Integrity  Outcome: Progressing  Goal: Optimal Wound Healing  Outcome: Progressing     Problem: Sepsis/Septic Shock  Goal: Optimal Coping  Outcome: Progressing  Goal: Absence of Bleeding  Outcome: Progressing  Goal: Blood Glucose Level Within Targeted Range  Outcome: Progressing  Goal: Absence of Infection Signs and Symptoms  Outcome: Progressing  Goal: Optimal Nutrition Intake  Outcome: Progressing     Problem: Fall Injury Risk  Goal: Absence of Fall and Fall-Related Injury  Outcome: Progressing     Problem: Infection  Goal: Absence of Infection Signs and Symptoms  Outcome: Progressing     Problem: Pain Acute  Goal: Optimal Pain Control and Function  Outcome: Progressing     Problem: UTI (Urinary Tract Infection)  Goal: Improved Infection Symptoms  Outcome: Progressing     Problem: Electrolyte Imbalance  Goal: Electrolyte Balance  Outcome: Progressing     Problem: [place]  Goal: Collaboration with the Healthcare Team  Outcome: Progressing     Problem: Confusion Acute  Goal: Optimal Cognitive Function  Outcome: Progressing     Problem: Comorbidity  Management  Goal: Maintenance of Heart Failure Symptom Control  Outcome: Progressing  Goal: Blood Pressure in Desired Range  Outcome: Progressing     Problem: Fatigue  Goal: Improved Activity Tolerance  Outcome: Progressing     Problem: Cognitive Impairment  Goal: Optimal Cognitive Function  Outcome: Progressing

## 2025-04-08 NOTE — ASSESSMENT & PLAN NOTE
Anemia is likely due to chronic disease due to Chronic Kidney Disease. Most recent hemoglobin and hematocrit are listed below.  Recent Labs     04/05/25  1109 04/07/25  0449 04/08/25  0430   HGB 7.9* 7.7* 9.0*   HCT 26.7* 26.3* 31.4*     Plan  -Monitor serial CBC: Daily  -Transfuse PRBC if patient becomes hemodynamically unstable, symptomatic or H/H drops below 7/21.  -Patient has not received any PRBC transfusions to date  -Patient's anemia is currently stable

## 2025-04-08 NOTE — ASSESSMENT & PLAN NOTE
[facility]  Plan:  -continue home medication  -monitor H/H

## 2025-04-08 NOTE — SUBJECTIVE & OBJECTIVE
"Interval History:  See hospital course    Review of Systems  Objective:     Vital Signs (Most Recent):  Temp: (!) 100.4 °F (38 °C) (04/08/25 0827)  Pulse: (!) 120 (04/08/25 0827)  Resp: 18 (04/08/25 0827)  BP: 119/68 (04/08/25 0827)  SpO2: 96 % (04/08/25 0827) Vital Signs (24h Range):  Temp:  [97.5 °F (36.4 °C)-100.4 °F (38 °C)] 100.4 °F (38 °C)  Pulse:  [] 120  Resp:  [16-20] 18  SpO2:  [96 %-100 %] 96 %  BP: (115-148)/(55-75) 119/68     Weight: 103.9 kg (229 lb 0.9 oz)  Body mass index is 31.07 kg/m².    Intake/Output Summary (Last 24 hours) at 4/8/2025 1032  Last data filed at 4/8/2025 0621  Gross per 24 hour   Intake --   Output 500 ml   Net -500 ml         Physical Exam  Vitals reviewed.   Constitutional:       General: He is not in acute distress.     Appearance: He is ill-appearing.   Cardiovascular:      Rate and Rhythm: Regular rhythm. Tachycardia present.      Heart sounds: No murmur heard.  Pulmonary:      Effort: Pulmonary effort is normal.      Breath sounds: Normal breath sounds. No wheezing, rhonchi or rales.   Abdominal:      General: Bowel sounds are normal. There is no distension.      Palpations: Abdomen is soft.      Tenderness: There is no abdominal tenderness. There is no guarding or rebound.   Musculoskeletal:      Right lower leg: Edema present.      Left lower leg: Edema present.      Comments: Stable erythema to bilateral lower extremities, trace edema   Neurological:      Mental Status: He is alert.      Comments: Resting with eyes open, moving all extremities answers "hmm" to all questions, states do not do that" when extremities are palpated               Significant Labs: All pertinent labs within the past 24 hours have been reviewed.    Significant Imaging: I have reviewed all pertinent imaging results/findings within the past 24 hours.  "

## 2025-04-08 NOTE — PROGRESS NOTES
[facility])  Nephrology  Progress Note    Patient Name: Mitch Whittaker  MRN: [id number]  Admission Date: 4/1/2025  Hospital Length of Stay: 7 days  Attending Provider: Zeenat Cordova MD   Primary Care Physician: Valery Caal MD  Principal Problem:Cellulitis of left lower extremity    Consults  Subjective:     The patient is a 69 y.o. male with a hx of ESRD who underwent living related kidney transplant about 10 years ago.  He has CKD stage 3 and is followed by Dr. Gonslaez of [facility].  History of CMV viremia with titer positive as recently as April of 2023 per outpatient notes from Dr. Gonsalez.  He has a multitude of chronic other medical conditions including CHF, type 2 diabetes, obesity etcetera.  He takes tacrolimus, prednisone, and mycophenolate for his immunosuppression.  Of note his mycophenolate was held for a period of time earlier in the year in the setting of his active C diff infection.  During his recent hospital stay this was assumed around March 20th.     Patient was most recently in the hospital about 2 weeks ago.  During the hospital stay he was treated with IV diuresis for acute on chronic heart failure.  He was also on oral vancomycin for recent C diff infection.  He was discharged March 22nd with a creatinine around 1.7.     Patient now presents yet again to the hospital April 1st and is admitted to the hospital by hospital medicine with lower extremity cellulitis.  We are consulted to assist in the care of this patient with renal transplant and CKD.  This morning his potassium was 3.3, BUN 35 and creatinine 1.9.    04/04/2025:  Patient was seen in his hospital room.  In bed resting comfortably.  No acute distress noted.  No new complaints from overnight.    04/05/2025:  Patient was seen in his hospital room.  In bed resting comfortably.  A family member was at the bedside assisting with breakfast.  No acute distress noted.  No new complaints from  overnight.    04/06/2025: Patient was seen in his hospital room.  In bed resting comfortably.  No acute distress noted.    04/07/2025: Patient was seen in his hospital room.  In bed resting comfortably.  No acute distress noted.  Somewhat disoriented this morning.    04/08/2025:  Seen in his hospital room, in bed resting comfortably.  Less interactive today.  A family member is at the bedside.  All nephrology related questions were answered to her satisfaction.    Review of systems:  He does not answer questions this morning.    Review of patient's allergies indicates:   Allergen Reactions    Lisinopril Other (See Comments)     Other reaction(s):  cough    Actos  [pioglitazone] Other (See Comments)     Other reaction(s): CHF    Metformin Other (See Comments)     Other reaction(s): renal insuff  Other reaction(s): CHF     Current Facility-Administered Medications   Medication Frequency    acetaminophen suppository 650 mg Q6H PRN    acetaminophen tablet 650 mg Q6H PRN    allopurinol split tablet 50 mg Daily    apixaban tablet 5 mg BID    aspirin EC tablet 81 mg Daily    calcitRIOL capsule 0.25 mcg Daily    cephALEXin capsule 500 mg Q8H    dextrose 50% injection 12.5 g PRN    dextrose 50% injection 25 g PRN    ferrous sulfate tablet 1 each Daily    furosemide tablet 40 mg Daily    glucagon (human recombinant) injection 1 mg PRN    glucose chewable tablet 16 g PRN    glucose chewable tablet 24 g PRN    HYDROcodone-acetaminophen 5-325 mg per tablet 1 tablet Q6H PRN    insulin aspart U-100 pen 0-5 Units QID (AC + HS) PRN    insulin glargine U-100 (Lantus) pen 10 Units BID    melatonin tablet 6 mg Nightly PRN    metoprolol succinate (TOPROL-XL) 24 hr tablet 25 mg Daily    naloxone 0.4 mg/mL injection 0.02 mg PRN    ondansetron injection 4 mg Q8H PRN    pantoprazole EC tablet 40 mg Daily    polyethylene glycol packet 17 g Daily PRN    predniSONE tablet 5 mg Daily    promethazine tablet 25 mg Q6H PRN    QUEtiapine tablet 25  mg QHS    simethicone chewable tablet 80 mg QID PRN    sodium bicarbonate tablet 650 mg BID    sodium chloride 0.9% flush 3 mL Q12H PRN    tacrolimus capsule 3 mg Daily PM    tacrolimus capsule 4 mg Daily AM       Objective:     Vital Signs (Most Recent):  Temp: (!) 100.4 °F (38 °C) (04/08/25 0827)  Pulse: (!) 120 (04/08/25 0827)  Resp: 18 (04/08/25 0827)  BP: 119/68 (04/08/25 0827)  SpO2: 96 % (04/08/25 0827) Vital Signs (24h Range):  Temp:  [97.5 °F (36.4 °C)-100.4 °F (38 °C)] 100.4 °F (38 °C)  Pulse:  [] 120  Resp:  [16-20] 18  SpO2:  [96 %-100 %] 96 %  BP: (115-148)/(55-75) 119/68     Weight: 103.9 kg (229 lb 0.9 oz) (04/02/25 2354)  Body mass index is 31.07 kg/m².  Body surface area is 2.3 meters squared.    I/O last 3 completed shifts:  In: -   Out: 500 [Urine:500]    Physical Exam  Constitutional:       Appearance: Normal appearance.   HENT:      Head: Normocephalic and atraumatic.   Eyes:      General: No scleral icterus.     Extraocular Movements: Extraocular movements intact.      Pupils: Pupils are equal, round, and reactive to light.   Cardiovascular:      Rate and Rhythm: Normal rate and regular rhythm.   Pulmonary:      Effort: Pulmonary effort is normal.      Breath sounds: No stridor.   Musculoskeletal:      Right lower leg: Edema present.      Left lower leg: Edema present.   Skin:     General: Skin is warm and dry.   Neurological:      General: No focal deficit present.      Mental Status: He is alert and oriented to person, place, and time.   Psychiatric:         Mood and Affect: Mood normal.         Behavior: Behavior normal.         Significant Labs:sureBMP:   Recent Labs   Lab 04/04/25  0510 04/05/25  1109 04/08/25  0430   *   < > 137   K 3.1*   < > 3.8      < > 103   CO2 20*   < > 22*   BUN 38*   < > 52*   CREATININE 1.8*   < > 2.5*   CALCIUM 8.3*   < > 9.9   MG 1.8  --   --     < > = values in this interval not displayed.     CMP:   Recent Labs   Lab 04/01/25  6947  04/01/25  2334 04/08/25  0430   CALCIUM 8.1*   < > 9.9   ALBUMIN 2.3*   < > 1.7*      < > 137   K 2.8*   < > 3.8   CO2 27   < > 22*      < > 103   BUN 27*   < > 52*   CREATININE 1.9*   < > 2.5*   ALKPHOS 96  --   --    ALT 14  --   --    AST 19  --   --    BILITOT 1.1*  --   --     < > = values in this interval not displayed.     All labs within the past 24 hours have been reviewed.    Significant Imaging:  Labs: Reviewed      Assessment/Plan:     Active Diagnoses:    Diagnosis Date Noted POA    PRINCIPAL PROBLEM:  Cellulitis of left lower extremity [L03.116] 04/02/2025 Yes    Acute encephalopathy [G93.40] 04/07/2025 No    Sepsis [A41.9] 04/02/2025 Yes    Hypomagnesemia [E83.42] 04/02/2025 Yes    Hypertension [I10] 04/02/2025 Yes     Chronic    Obesity (BMI 30-39.9) [E66.9] 04/02/2025 Yes     Chronic    History of DVT (deep vein thrombosis) [Z86.718] 04/02/2025 Not Applicable     Chronic    Weakness of both lower extremities [R29.898] 04/02/2025 Yes     Chronic    Anemia, chronic disease [D63.8] 03/01/2025 Yes     Chronic    Hyperlipidemia associated with type 2 diabetes mellitus [E11.69, E78.5] 12/13/2024 Yes     Chronic    Chronic venous insufficiency of lower extremity [I87.2]  Yes    Chronic combined systolic and diastolic congestive heart failure [I50.42] 03/15/2019 Yes     Chronic    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, with long-term current use of insulin [E11.3553, Z79.4] 03/27/2017 Not Applicable     Chronic    Chronic immunosuppression with Prograf, MMF and prednisone [Z29.89] 06/18/2015 Not Applicable     Chronic    Coronary artery disease of native artery of native heart with stable angina pectoris [I25.118]  Yes     Chronic    Obstructive sleep apnea [G47.33] 06/13/2013 Yes     Chronic      Problems Resolved During this Admission:    Diagnosis Date Noted Date Resolved POA    Thrombocytopenia [D69.6] 04/03/2025 04/06/2025 No    Hypokalemia [E87.6]  04/02/2025 04/06/2025 Yes       Assessment and plan:    1. Kidney transplant status: In around living related donor transplant in 2015.  Serum creatinine has been slowly creeping up, up to 2.5 on most recent laboratory studies.    Urine studies were not revealing.    Acute rise in creatinine is likely volume related.  He has become more confused.  I suspect that he is not drinking enough fluids.  Unfortunately I's and O's are not being recorded.  Will give a L of normal saline over the next 24 hours and monitor renal function.    Baseline creatinine is usually between about 1.3 and 1.5.  Will continue to monitor.    2. Immunosuppression:  He had been on 4 mg twice daily of Prograf.  Prograf level on 04/07/2025 was 7.0.  Prograf was decreased to 4 mg in the morning 3 in the evening.  Will check a level tomorrow.    3. Cellulitis:  Appears to be improving.  Defer to primary service for management.    4. Hypokalemia:  Potassium is 3.8.  Would continue to replace per protocol.      A minimum of 50 minutes was spent in patient examination, chart review and coordination of care with other providers.    Thank you for your consult.     Noel Jimenes MD  Nephrology  [facility] - Telemetry ([facility])

## 2025-04-08 NOTE — ASSESSMENT & PLAN NOTE
Patient reported worsening bilateral lower extremity weakness times 2-3 months in which he reported symptoms began acutely after his right leg gave out going to the restroom.  Patient denies endorsing any back pain, experiencing ground level fall/trauma, but does report fecal incontinence when urinating.  Patient has since been bed-bound in his not been seen/evaluated by a physician for these symptoms.  PT/OT evaluated and recommended moderate intensity therapy on discharge but family not interested in pursuing SNF placement at this time  Social work consulted and following

## 2025-04-08 NOTE — ASSESSMENT & PLAN NOTE
This patient does have evidence of infective focus  My overall impression is sepsis.  Source: Skin and Soft Tissue (location left leg cellulitis, gout of left knee)  Antibiotics given-   Antibiotics (72h ago, onward)      Start     Stop Route Frequency Ordered    04/05/25 2200  cephALEXin capsule 500 mg         -- Oral Every 8 hours 04/05/25 1654          Continue p.o. Keflex   Uric acid elevated, we will discuss with Nephrology regarding treatment of gout

## 2025-04-08 NOTE — ASSESSMENT & PLAN NOTE
Wife reports 2 day history of worsening confusion from patient's baseline, suspect hospital delirium versus medication induced as patient was recently on cefepime.   Cefepime discontinued on 0 4/0 4   Minimize sedating meds   Continue Seroquel q.h.s. for possible delirium   Repeat UA negative for UTI, B12 elevated.  TSH, ammonia within normal limits.  Folic acid pending.  We will obtain MRI brain for further evaluation

## 2025-04-09 LAB
ABSOLUTE NEUTROPHIL MANUAL (OHS): 0.5 K/UL
ALBUMIN SERPL BCP-MCNC: 1.6 G/DL (ref 3.5–5.2)
ANION GAP (OHS): 10 MMOL/L (ref 8–16)
ANION GAP (OHS): 13 MMOL/L (ref 8–16)
ANISOCYTOSIS BLD QL SMEAR: SLIGHT
BUN SERPL-MCNC: 54 MG/DL (ref 8–23)
BUN SERPL-MCNC: 55 MG/DL (ref 8–23)
CALCIUM SERPL-MCNC: 9 MG/DL (ref 8.7–10.5)
CALCIUM SERPL-MCNC: 9.1 MG/DL (ref 8.7–10.5)
CHLORIDE SERPL-SCNC: 103 MMOL/L (ref 95–110)
CHLORIDE SERPL-SCNC: 103 MMOL/L (ref 95–110)
CO2 SERPL-SCNC: 20 MMOL/L (ref 23–29)
CO2 SERPL-SCNC: 23 MMOL/L (ref 23–29)
CREAT SERPL-MCNC: 2.6 MG/DL (ref 0.5–1.4)
CREAT SERPL-MCNC: 2.7 MG/DL (ref 0.5–1.4)
EOSINOPHIL NFR BLD MANUAL: 1 % (ref 0–8)
ERYTHROCYTE [DISTWIDTH] IN BLOOD BY AUTOMATED COUNT: 17.6 % (ref 11.5–14.5)
GFR SERPLBLD CREATININE-BSD FMLA CKD-EPI: 24 ML/MIN/1.73/M2
GFR SERPLBLD CREATININE-BSD FMLA CKD-EPI: 26 ML/MIN/1.73/M2
GLUCOSE SERPL-MCNC: 236 MG/DL (ref 70–110)
GLUCOSE SERPL-MCNC: 244 MG/DL (ref 70–110)
HCT VFR BLD AUTO: 30.6 % (ref 40–54)
HGB BLD-MCNC: 8.8 GM/DL (ref 14–18)
LYMPHOCYTES NFR BLD MANUAL: 45 % (ref 18–48)
MAGNESIUM SERPL-MCNC: 1.7 MG/DL (ref 1.6–2.6)
MCH RBC QN AUTO: 24 PG (ref 27–31)
MCHC RBC AUTO-ENTMCNC: 28.8 G/DL (ref 32–36)
MCV RBC AUTO: 84 FL (ref 82–98)
METAMYELOCYTES NFR BLD MANUAL: 1 %
MONOCYTES NFR BLD MANUAL: 27 % (ref 4–15)
MYELOCYTES NFR BLD MANUAL: 2 %
NEUTROPHILS NFR BLD MANUAL: 20 % (ref 38–73)
NEUTS BAND NFR BLD MANUAL: 4 %
NUCLEATED RBC (/100WBC) (OHS): 0 /100 WBC
PHOSPHATE SERPL-MCNC: 3.6 MG/DL (ref 2.7–4.5)
PLATELET # BLD AUTO: 228 K/UL (ref 150–450)
PLATELET BLD QL SMEAR: ABNORMAL
PMV BLD AUTO: 10.8 FL (ref 9.2–12.9)
POCT GLUCOSE: 212 MG/DL (ref 70–110)
POCT GLUCOSE: 253 MG/DL (ref 70–110)
POCT GLUCOSE: 255 MG/DL (ref 70–110)
POCT GLUCOSE: 258 MG/DL (ref 70–110)
POLYCHROMASIA BLD QL SMEAR: ABNORMAL
POTASSIUM SERPL-SCNC: 3.6 MMOL/L (ref 3.5–5.1)
POTASSIUM SERPL-SCNC: 3.6 MMOL/L (ref 3.5–5.1)
RBC # BLD AUTO: 3.66 M/UL (ref 4.6–6.2)
SODIUM SERPL-SCNC: 136 MMOL/L (ref 136–145)
SODIUM SERPL-SCNC: 136 MMOL/L (ref 136–145)
TACROLIMUS BLD-MCNC: 9.1 NG/ML (ref 5–15)
WBC # BLD AUTO: 2 K/UL (ref 3.9–12.7)

## 2025-04-09 PROCEDURE — 21400001 HC TELEMETRY ROOM

## 2025-04-09 PROCEDURE — 63600175 PHARM REV CODE 636 W HCPCS: Performed by: HOSPITALIST

## 2025-04-09 PROCEDURE — 80069 RENAL FUNCTION PANEL: CPT | Performed by: INTERNAL MEDICINE

## 2025-04-09 PROCEDURE — 99233 SBSQ HOSP IP/OBS HIGH 50: CPT | Mod: ,,, | Performed by: INTERNAL MEDICINE

## 2025-04-09 PROCEDURE — 25000003 PHARM REV CODE 250: Performed by: NURSE PRACTITIONER

## 2025-04-09 PROCEDURE — 36415 COLL VENOUS BLD VENIPUNCTURE: CPT | Performed by: INTERNAL MEDICINE

## 2025-04-09 PROCEDURE — 97530 THERAPEUTIC ACTIVITIES: CPT

## 2025-04-09 PROCEDURE — 85025 COMPLETE CBC W/AUTO DIFF WBC: CPT | Performed by: INTERNAL MEDICINE

## 2025-04-09 PROCEDURE — 80197 ASSAY OF TACROLIMUS: CPT | Performed by: INTERNAL MEDICINE

## 2025-04-09 PROCEDURE — 25000003 PHARM REV CODE 250: Performed by: INTERNAL MEDICINE

## 2025-04-09 PROCEDURE — 97530 THERAPEUTIC ACTIVITIES: CPT | Mod: CQ

## 2025-04-09 PROCEDURE — 83735 ASSAY OF MAGNESIUM: CPT | Performed by: NURSE PRACTITIONER

## 2025-04-09 PROCEDURE — 94761 N-INVAS EAR/PLS OXIMETRY MLT: CPT

## 2025-04-09 PROCEDURE — 63600175 PHARM REV CODE 636 W HCPCS: Performed by: INTERNAL MEDICINE

## 2025-04-09 PROCEDURE — 80048 BASIC METABOLIC PNL TOTAL CA: CPT | Performed by: NURSE PRACTITIONER

## 2025-04-09 PROCEDURE — 25000003 PHARM REV CODE 250: Performed by: HOSPITALIST

## 2025-04-09 PROCEDURE — 36415 COLL VENOUS BLD VENIPUNCTURE: CPT | Performed by: NURSE PRACTITIONER

## 2025-04-09 RX ORDER — LANOLIN ALCOHOL/MO/W.PET/CERES
400 CREAM (GRAM) TOPICAL ONCE
Status: DISCONTINUED | OUTPATIENT
Start: 2025-04-09 | End: 2025-04-10

## 2025-04-09 RX ORDER — METOPROLOL SUCCINATE 25 MG/1
25 TABLET, EXTENDED RELEASE ORAL DAILY
Status: DISCONTINUED | OUTPATIENT
Start: 2025-04-09 | End: 2025-04-11 | Stop reason: HOSPADM

## 2025-04-09 RX ORDER — INSULIN GLARGINE 100 [IU]/ML
14 INJECTION, SOLUTION SUBCUTANEOUS 2 TIMES DAILY
Status: DISCONTINUED | OUTPATIENT
Start: 2025-04-09 | End: 2025-04-10

## 2025-04-09 RX ORDER — POTASSIUM CHLORIDE 20 MEQ/1
20 TABLET, EXTENDED RELEASE ORAL ONCE
Status: DISCONTINUED | OUTPATIENT
Start: 2025-04-09 | End: 2025-04-10

## 2025-04-09 RX ADMIN — INSULIN GLARGINE 14 UNITS: 100 INJECTION, SOLUTION SUBCUTANEOUS at 08:04

## 2025-04-09 RX ADMIN — INSULIN GLARGINE 10 UNITS: 100 INJECTION, SOLUTION SUBCUTANEOUS at 09:04

## 2025-04-09 RX ADMIN — CALCITRIOL CAPSULES 0.25 MCG 0.25 MCG: 0.25 CAPSULE ORAL at 09:04

## 2025-04-09 RX ADMIN — SODIUM CHLORIDE: 9 INJECTION, SOLUTION INTRAVENOUS at 08:04

## 2025-04-09 RX ADMIN — APIXABAN 5 MG: 2.5 TABLET, FILM COATED ORAL at 09:04

## 2025-04-09 RX ADMIN — SODIUM BICARBONATE 650 MG: 650 TABLET ORAL at 09:04

## 2025-04-09 RX ADMIN — TACROLIMUS 4 MG: 1 CAPSULE ORAL at 09:04

## 2025-04-09 RX ADMIN — HYDROCODONE BITARTRATE AND ACETAMINOPHEN 1 TABLET: 5; 325 TABLET ORAL at 05:04

## 2025-04-09 RX ADMIN — APIXABAN 5 MG: 2.5 TABLET, FILM COATED ORAL at 08:04

## 2025-04-09 RX ADMIN — SODIUM BICARBONATE 650 MG: 650 TABLET ORAL at 08:04

## 2025-04-09 RX ADMIN — INSULIN ASPART 3 UNITS: 100 INJECTION, SOLUTION INTRAVENOUS; SUBCUTANEOUS at 05:04

## 2025-04-09 RX ADMIN — PANTOPRAZOLE SODIUM 40 MG: 40 TABLET, DELAYED RELEASE ORAL at 09:04

## 2025-04-09 RX ADMIN — PREDNISONE 5 MG: 5 TABLET ORAL at 09:04

## 2025-04-09 RX ADMIN — TACROLIMUS 3 MG: 1 CAPSULE ORAL at 05:04

## 2025-04-09 RX ADMIN — ALLOPURINOL 50 MG: 300 TABLET ORAL at 09:04

## 2025-04-09 RX ADMIN — FERROUS SULFATE TAB 325 MG (65 MG ELEMENTAL FE) 1 EACH: 325 (65 FE) TAB at 09:04

## 2025-04-09 RX ADMIN — INSULIN ASPART 1 UNITS: 100 INJECTION, SOLUTION INTRAVENOUS; SUBCUTANEOUS at 08:04

## 2025-04-09 RX ADMIN — CEPHALEXIN 500 MG: 500 CAPSULE ORAL at 05:04

## 2025-04-09 RX ADMIN — QUETIAPINE FUMARATE 25 MG: 25 TABLET ORAL at 08:04

## 2025-04-09 RX ADMIN — ASPIRIN 81 MG: 81 TABLET, COATED ORAL at 09:04

## 2025-04-09 RX ADMIN — CEPHALEXIN 500 MG: 500 CAPSULE ORAL at 03:04

## 2025-04-09 RX ADMIN — CEPHALEXIN 500 MG: 500 CAPSULE ORAL at 09:04

## 2025-04-09 NOTE — PT/OT/SLP PROGRESS
Occupational Therapy   Treatment    Name: Mitch Whittaker  MRN: [id number]  Admitting Diagnosis:  Cellulitis of left lower extremity       Recommendations:     Discharge Recommendations: Moderate Intensity Therapy  Discharge Equipment Recommendations:  to be determined by next level of care  Barriers to discharge:  None    Assessment:     Mitch Whittaker is a 69 y.o. male with a medical diagnosis of Cellulitis of left lower extremity.  He presents with the following performance deficits affecting function are weakness, impaired endurance, impaired self care skills, impaired functional mobility, gait instability, impaired balance, impaired cardiopulmonary response to activity, pain, decreased safety awareness, edema, impaired skin, decreased lower extremity function, decreased upper extremity function, decreased coordination, decreased ROM, abnormal tone, impaired coordination, impaired fine motor.     Rehab Prognosis:  Poor; patient would benefit from acute skilled OT services to address these deficits and reach maximum level of function.       Plan:     Patient to be seen 2 x/week to address the above listed problems via self-care/home management, therapeutic activities, therapeutic exercises  Plan of Care Expires: 04/16/25  Plan of Care Reviewed with: patient    Subjective     Chief Complaint: my legs hurt  Patient/Family Comments/goals: none reported   Pain/Comfort:  Pain Rating 1: 10/10  Location - Side 1: Left  Location - Orientation 1: generalized  Location 1: leg  Pain Addressed 1: Cessation of Activity  Pain Rating Post-Intervention 1: 10/10    Objective:     Communicated with: nurse, jackie, prior to session.  Patient found supine with peripheral IV, telemetry, pressure relief boots, PureWick upon OT entry to room.    General Precautions: Standard, fall, special contact    Orthopedic Precautions:N/A  Braces: N/A  Respiratory Status: Room air     Occupational Performance:     Bed Mobility:    Unable to complete  "despite multiple trials 2' to increased pain with simple touch to initiate task. Pt BUE and BLE affected and very limited ROM/strength. Pt in severe pain with light touch to LLE with termination of attempt to passively transition to EOB.   Pt demo poor prognosis and is unable to tolerate any passive ROM or functional task at this time, warranting no further therapy indication at this time.   Pt kalie poorly with very minimal muscle activation and is only AxO x2 and questioned multiple times, "why are these people lying to me?" And "is it 1975?"  Pt demo severe functional decline in status and prior MD notes report pt reported he is bed bound since initial admission.     Functional Mobility/Transfers: unsafe at this time        [facility] 6 Click ADL: 9    Treatment & Education:  Educated on orientation. Pt is demonstrating poor recall, poor tolerance to any bed mobility 2' to severe pain, change in cognitive status and functional performance. Reported change in status and discussed with OTR on change in POC.    Patient left supine with all lines intact and call button in reach    GOALS:   Multidisciplinary Problems       Occupational Therapy Goals          Problem: Occupational Therapy    Goal Priority Disciplines Outcome Interventions   Occupational Therapy Goal     OT, PT/OT Progressing    Description: O.T. GOALS TO BE MET 4-16-25  PT WILL TOLERATE 1 SET X 20 REPS B UE ROM EXERCISE  MOD A WITH SUPINE<SIT TRANSFERS  MAX A WITH BSC TRANSFERS  MIN A WITH ROLLING L<>R                         DME Justifications:  No DME recommended requiring DME justifications    Time Tracking:     OT Date of Treatment: 04/09/25  OT Start Time: 0948  OT Stop Time: 1003  OT Total Time (min): 15 min    Billable Minutes:Therapeutic Activity 15  ZABRINA Santos  A client care conference was performed between the OTR/L and GERBER, prior to treatment by GERBER, to discuss the patient's status, treatment plan and established goals.      OT/ANNIA: " ANNIA     Number of ANNIA visits since last OT visit: 1    4/9/2025

## 2025-04-09 NOTE — PT/OT/SLP PROGRESS
Physical Therapy  Treatment    Mitch Whittaker   MRN: [id number]   Admitting Diagnosis: Cellulitis of left lower extremity    PT Received On: 04/09/25  PT Start Time: 0948     PT Stop Time: 1006    PT Total Time (min): 18 min       Billable Minutes:  Therapeutic Activity 18       PT/PTA: PTA     Number of PTA visits since last PT visit: 1       General Precautions: Standard, fall, special contact  Orthopedic Precautions: N/A  Braces: N/A  Respiratory Status: Room air    Spiritual, Cultural Beliefs, Islam Practices, Values that Affect Care: no    Subjective:  Communicated with patient's nurse Shanice and performed Epic chart review prior to session.  Patient states that he is very drowsy. Was initially agreeable to therapy but then refused with extremely minimal movement due to pain. Thought the year was 1975, then 2075. Kept repeating that people are lying to him.    Pain/Comfort  Pain Rating 1: 10/10  Location - Side 1: Left  Location - Orientation 1: generalized  Location 1: leg  Pain Addressed 1: Cessation of Activity    Objective:   Patient found with: peripheral IV, telemetry, pressure relief boots    Functional Mobility:  Bed Mobility:    Unable    Transfers:   Unable    Gait:    Unable    Treatment and Education:  Educated patient on importance of increased tolerance to upright position and direct impact on CV endurance and strength. Patient encouraged to utilize elevated HOB to simulate chair position until able to safely complete chair T/F. Patient given a minimum goal of majority of the day to be spent in upright, especially with all meals. Encouraged patient to perform AROM TE to BLE throughout the day within all available planes of motion. Re enforced importance of utilizing call light to meet needs in room and not attempt to get up without staff assistance. Patient verbalized understanding and agreed to comply.     AM-PAC 6 CLICK MOBILITY  How much help from another person does this patient currently  need?   1 = Unable, Total/Dependent Assistance  2 = A lot, Maximum/Moderate Assistance  3 = A little, Minimum/Contact Guard/Supervision  4 = None, Modified [place]/Independent    Turning over in bed (including adjusting bedclothes, sheets and blankets)?: 1  Sitting down on and standing up from a chair with arms (e.g., wheelchair, bedside commode, etc.): 1  Moving from lying on back to sitting on the side of the bed?: 1  Moving to and from a bed to a chair (including a wheelchair)?: 1  Need to walk in hospital room?: 1  Climbing 3-5 steps with a railing?: 1  Basic Mobility Total Score: 6    AM-PAC Raw Score CMS G-Code Modifier Level of Impairment Assistance   6 % Total / Unable   7 - 9 CM 80 - 100% Maximal Assist   10 - 14 CL 60 - 80% Moderate Assist   15 - 19 CK 40 - 60% Moderate Assist   20 - 22 CJ 20 - 40% Minimal Assist   23 CI 1-20% SBA / CGA   24 CH 0% Independent/ Mod I     Patient left HOB elevated with all lines intact, call button in reach, and bed alarm on.    Assessment:  Mitch Whittaker is a 69 y.o. male with a medical diagnosis of Cellulitis of left lower extremity and presents with overall decline in functional mobility. Patient would continue to benefit from skilled PT to address functional limitations listed below in order to return to PLOF/decrease caregiver burden.    Patient unable to demonstrate any form of mobility and refused any further assistance to EOB due to pain in the left leg. Pain was elicited with the most minimalist of movements. Exhibits cognitive impairment. Patient appears to be progressively declining at this time.    Rehab identified problem list/impairments: weakness, impaired endurance, impaired self care skills, impaired functional mobility, gait instability, impaired balance, impaired cognition, decreased coordination, decreased upper extremity function, decreased lower extremity function, decreased safety awareness, pain, decreased ROM    Rehab potential is  poor.    Activity tolerance: Poor    Discharge recommendations: Moderate Intensity Therapy      Barriers to discharge:      Equipment recommendations: to be determined by next level of care     GOALS:   Multidisciplinary Problems       Physical Therapy Goals          Problem: Physical Therapy    Goal Priority Disciplines Outcome Interventions   Physical Therapy Goal     PT, PT/OT Not Progressing    Description: Pt will perform bed mobility with mod A in order to participate in EOB activity.  Pt will perform transfers with mod A in order to participate in OOB activity.  Pt will ambulate 50 ft mod I with LRAD in order to participate in daily tasks.   Pt will tolerate sitting OOB in chair x 2-4 hrs in order to participate in daily tasks.                        DME Justifications:  No DME recommended requiring DME justifications    PLAN:    Patient to be seen 3 x/week to address the above listed problems via therapeutic activities, therapeutic exercises  Plan of Care expires: 04/16/25  Plan of Care reviewed with: patient         04/09/2025

## 2025-04-09 NOTE — PROGRESS NOTES
Inpatient Nutrition Assessment    Admit Date: 4/1/2025   Total duration of encounter: 8 days   Patient Age: 69 y.o.    Nutrition Recommendation/Prescription     Continue current diet (Diet Minced & Moist (IDDSI Level 5) Consistent Carbohydrate, Renal Non-Dialysis; 2000 Calories (up to 75 gm per meal)) as tolerated.   2. Add Boost Glucose Control (provides 190 kcal, 16 g protein per serving) BID  3. Continue medical management of hyperglycemia  4. Monitor PO intakes, labs, and wt changes  5. Recommended updated wts twice weekly    Communication of Recommendations:  EMR    Nutrition Assessment     Malnutrition Assessment/Nutrition-Focused Physical Exam  Unable to assess at this time.                                                               A minimum of two characteristics is recommended for diagnosis of either severe or non-severe malnutrition.    Chart Review    Reason Seen: length of stay    Malnutrition Screening Tool Results              Diagnosis:  Cellulitis of left lower extremity  Chronic venous insufficiency of lower extremity  Sepsis  Acute encephalopathy  Weakness of both lower extremities  Chronic combined systolic and diastolic CHF    Relevant Medical History:   Acquired renal cyst of left kidney, Anemia associated with chronic renal failure, CAD (coronary artery disease), CHF (congestive heart failure), Chronic immunosuppression with Prograf and MMF (06/18/2015), Chronic venous insufficiency of lower extremity, CKD (chronic kidney disease) stage 3, GFR 30-59 ml/min, 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), Diabetic retinopathy, DM (diabetes mellitus), type 2 with complications (1994), Edema, End stage kidney disease, Gallbladder polyp, Heart failure, diastolic, due to HTN, Hemodialysis status, 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, History of colon polyps, HPTH (hyperparathyroidism), Hyperlipidemia, Hypertension associated with stage 3 chronic kidney disease due to type 2 diabetes  mellitus, LBBB (left bundle branch block) (12/20/2021), Morbid obesity with BMI of 45.0-49.9, adult, Nephrolithiasis (6/7/2013), PCO (posterior capsular opacification), left (03/04/2019), Proteinuria, S/P kidney transplant, Sleep apnea, Type 2 diabetes, uncontrolled, with retinopathy, and Type II diabetes mellitus with renal manifestations     Scheduled Medications:  allopurinoL, 50 mg, Daily  apixaban, 5 mg, BID  aspirin, 81 mg, Daily  calcitRIOL, 0.25 mcg, Daily  cephALEXin, 500 mg, Q8H  ferrous sulfate, 1 tablet, Daily  insulin glargine U-100 (Lantus), 10 Units, BID  metoprolol succinate, 25 mg, Daily  pantoprazole, 40 mg, Daily  predniSONE, 5 mg, Daily  QUEtiapine, 25 mg, QHS  sodium bicarbonate, 650 mg, BID  tacrolimus, 3 mg, Daily PM  tacrolimus, 4 mg, Daily AM    Continuous Infusions:  0.9% NaCl, Last Rate: 75 mL/hr at 04/09/25 0211    PRN Medications:  acetaminophen, 650 mg, Q6H PRN  acetaminophen, 650 mg, Q6H PRN  dextrose 50%, 12.5 g, PRN  dextrose 50%, 25 g, PRN  glucagon (human recombinant), 1 mg, PRN  glucose, 16 g, PRN  glucose, 24 g, PRN  HYDROcodone-acetaminophen, 1 tablet, Q6H PRN  insulin aspart U-100, 0-5 Units, QID (AC + HS) PRN  melatonin, 6 mg, Nightly PRN  naloxone, 0.02 mg, PRN  ondansetron, 4 mg, Q8H PRN  polyethylene glycol, 17 g, Daily PRN  promethazine, 25 mg, Q6H PRN  simethicone, 1 tablet, QID PRN  sodium chloride 0.9%, 3 mL, Q12H PRN    Calorie Containing IV Medications: no significant kcals from medications at this time    Recent Labs   Lab 04/03/25  0954 04/04/25  0510 04/05/25  1109 04/06/25  1136 04/07/25  0449 04/07/25  1633 04/08/25  0430 04/09/25  0428   * 132* 134* 135* 137  --  137 136   K 3.3* 3.1* 3.5 3.8 3.7  --  3.8 3.6   CALCIUM 8.0* 8.3* 8.7 9.2 9.2  --  9.9 9.1   PHOS  --  2.9 2.2* 2.2* 2.8  --  3.3 3.6   MG 1.7 1.8  --   --   --   --   --   --     101 102 104 105  --  103 103   CO2 21* 20* 23 22* 21*  --  22* 20*   BUN 35* 38* 42* 46* 49*  --  52* 54*    CREATININE 1.9* 1.8* 2.1* 2.3* 2.4*  --  2.5* 2.7*   EGFRNORACEVR 37* 40* 33* 30* 28*  --  27* 24*   GLUCOSE 186* 169* 155* 166* 135*  --  108 244*   ALBUMIN  --  1.8* 1.6* 1.6* 1.6*  --  1.7* 1.6*   AMMONIA  --   --   --   --   --  24  --   --    WBC 2.60* 2.14* 1.38*  --  1.90*  --  2.13* 2.00*   HGB 7.9* 7.9* 7.9*  --  7.7*  --  9.0* 8.8*   HCT 27.7* 26.6* 26.7*  --  26.3*  --  31.4* 30.6*     Nutrition Orders:  Diet Minced & Moist (IDDSI Level 5) Consistent Carbohydrate, Renal Non-Dialysis; 2000 Calories (up to 75 gm per meal)      Appetite/Oral Intake: fair/0-25% of meals  Factors Affecting Nutritional Intake: behavioral (mealtime), decreased appetite, and difficulty/impaired swallowing  Social Needs Impacting Access to Food: unable to assess at this time; will attempt on follow-up  Food/Caodaism/Cultural Preferences: unable to obtain  Food Allergies: no known food allergies  Last Bowel Movement: 04/07/25  Wound(s):  Skin intact per EMR    Comments    4/9/25: Dietitian working remotely. Per EMR flowsheets, pt with 0-25% intake of meals yesterday. % intake of meals documented on 4/4-5. Pt is currently on a minced and moist diet, Boost Glucose Control added to aid in meeting energy needs. Pt noted to have increased confusion, suspect this has affected his PO intake.     Anthropometrics    Height: 6' (182.9 cm),    Last Weight: 103.9 kg (229 lb 0.9 oz) (04/02/25 2354), Weight Method: Bed Scale  BMI (Calculated): 31.1  BMI Classification: obese grade I (BMI 30-34.9)        Ideal Body Weight (IBW), Male: 178 lb     % Ideal Body Weight, Male (lb): 127.11 %                          Usual Weight Provided By: EMR weight history    Wt Readings from Last 5 Encounters:   04/02/25 103.9 kg (229 lb 0.9 oz)   03/18/25 116.3 kg (256 lb 6.3 oz)   03/12/25 103.8 kg (228 lb 13.4 oz)   01/28/25 130.5 kg (287 lb 11.2 oz)   12/30/24 120.7 kg (265 lb 15.8 oz)     Weight Change(s) Since Admission:   Wt Readings from Last 1  Encounters:   04/02/25 2354 103.9 kg (229 lb 0.9 oz)   04/01/25 2118 106.6 kg (235 lb 0.2 oz)   04/01/25 2000 102.6 kg (226 lb 4.1 oz)   04/01/25 1849 102.3 kg (225 lb 8.5 oz)   Admit Weight: 102.3 kg (225 lb 8.5 oz) (04/01/25 1849), Weight Method: Bed Scale    Estimated Needs    Weight Used For Calorie Calculations: 103.9 kg (229 lb 0.9 oz)  Energy Calorie Requirements (kcal): 2198 kcals (1.2 SFxMSJ)  Energy Need Method: [place]-St Jeor  Weight Used For Protein Calculations: 103.9 kg (229 lb 0.9 oz)  Protein Requirements:  g (0.8-1 g/kg)  Fluid Requirements (mL): 1000 mL + UOP (or per provider)  CHO Requirement: 247 g/day (45% of total EEN)     Enteral Nutrition     Patient not receiving enteral nutrition at this time.    Parenteral Nutrition     Patient not receiving parenteral nutrition support at this time.    Evaluation of Received Nutrient Intake    Calories: not meeting estimated needs  Protein: not meeting estimated needs    Patient Education     Not applicable.    Nutrition Diagnosis     PES: Inadequate energy intake related to acute illness as evidenced by pt meeting 0-25% of meals. (new)     PES:            Nutrition Interventions     Intervention(s): modified composition of meals/snacks and commercial beverage  Intervention(s):      Goal: Meet greater than 80% of nutritional needs by follow-up. (new)  Goal: Consume % of oral supplements by follow-up. (new)    Nutrition Goals & Monitoring     Dietitian will monitor: energy intake, weight, electrolyte/renal panel, glucose/endocrine profile, and gastrointestinal profile  Discharge planning:  Renal diet with texture modifications per SLP  Nutrition Risk/Follow-Up: moderate (follow-up in 3-5 days)   Please consult if re-assessment needed sooner.

## 2025-04-09 NOTE — SUBJECTIVE & OBJECTIVE
Interval History: See Hospital Course     Review of Systems  Objective:     Vital Signs (Most Recent):  Temp: 97.5 °F (36.4 °C) (04/09/25 1202)  Pulse: 96 (04/09/25 1202)  Resp: 18 (04/09/25 1202)  BP: (!) 119/56 (04/09/25 1202)  SpO2: 97 % (04/09/25 1202) Vital Signs (24h Range):  Temp:  [97.5 °F (36.4 °C)-98.9 °F (37.2 °C)] 97.5 °F (36.4 °C)  Pulse:  [] 96  Resp:  [18-22] 18  SpO2:  [96 %-98 %] 97 %  BP: ()/(53-70) 119/56     Weight: 103.9 kg (229 lb 0.9 oz)  Body mass index is 31.07 kg/m².    Intake/Output Summary (Last 24 hours) at 4/9/2025 1344  Last data filed at 4/9/2025 0211  Gross per 24 hour   Intake 800.7 ml   Output 500 ml   Net 300.7 ml         Physical Exam  Vitals and nursing note reviewed.   Constitutional:       General: He is not in acute distress.     Appearance: He is ill-appearing (chronic).   Cardiovascular:      Rate and Rhythm: Normal rate and regular rhythm.      Heart sounds: No murmur heard.  Pulmonary:      Effort: Pulmonary effort is normal.      Breath sounds: Normal breath sounds. No wheezing or rales.   Abdominal:      General: Bowel sounds are normal. There is no distension.      Palpations: Abdomen is soft.      Tenderness: There is no abdominal tenderness.   Musculoskeletal:      Comments: Trace BLE edema    Skin:     Comments: Faint erythema to LLE    Neurological:      Mental Status: He is alert.      Comments: Oriented x 1               Significant Labs: All pertinent labs within the past 24 hours have been reviewed.    Significant Imaging: I have reviewed all pertinent imaging results/findings within the past 24 hours.

## 2025-04-09 NOTE — ASSESSMENT & PLAN NOTE
This patient does have evidence of infective focus  My overall impression is sepsis.  Source: Skin and Soft Tissue (location left leg cellulitis, gout of left knee)  Antibiotics given-   Antibiotics (72h ago, onward)      Start     Stop Route Frequency Ordered    04/05/25 2200  cephALEXin capsule 500 mg         -- Oral Every 8 hours 04/05/25 1654          Continue p.o. Keflex

## 2025-04-09 NOTE — ASSESSMENT & PLAN NOTE
"Patient has Combined Systolic and Diastolic heart failure that is Chronic. On presentation their CHF was well compensated. Most recent BNP and echo results are listed below.  No results for input(s): "BNP" in the last 72 hours.  Latest ECHO  Results for orders placed during the hospital encounter of 03/13/25    Echo Saline Bubble? No    Interpretation Summary    Left Ventricle: The left ventricle is normal in size. Mildly increased ventricular mass. Mildly increased wall thickness. There is mild concentric hypertrophy. Normal wall motion. Septal motion is consistent with bundle branch block. There is moderately reduced systolic function with a visually estimated ejection fraction of 35 - 40%. Grade I diastolic dysfunction.    Right Ventricle: The right ventricle is normal in size. Wall thickness is normal. Systolic function is normal.    Left Atrium: Mildly dilated    Aorta: Aortic annulus is mildly dilated measuring 4.01 cm. Ascending aorta is normal measuring 3.69 cm.    Pulmonary Artery: The estimated pulmonary artery systolic pressure is 24 mmHg.    IVC/SVC: Normal venous pressure at 3 mmHg.    Current Heart Failure Medications  metoprolol succinate (TOPROL-XL) 24 hr tablet 25 mg, Daily, Oral    Plan  -Monitor strict I&Os and daily weights.    -Place on telemetry  -Low sodium diet  -Place on fluid restriction of 1.5 L.   -Cardiology has not been consulted  -The patient's volume status is at their baseline  -Continue Beta-blocker, Entresto and Lasix on hold for now given rise in Cr, started on IVF per renal     "

## 2025-04-09 NOTE — SIGNIFICANT EVENT
Patient continues to be confused.  Refusing all meds tonight.  Primary MD aware of previous interruptions in care due to uncooperativeness and MRI brain was done with no acute findings.  VSS at this time.

## 2025-04-09 NOTE — ASSESSMENT & PLAN NOTE
Patient's FSGs are controlled on current medication regimen.  Last A1c reviewed-   Lab Results   Component Value Date    HGBA1C 5.7 (H) 12/17/2024     Most recent fingerstick glucose reviewed-   Recent Labs   Lab 04/08/25  1632 04/09/25  0545 04/09/25  1134   POCTGLUCOSE 129* 255* 212*     Current correctional scale  Medium  Increase anti-hyperglycemic dose as follows-   Antihyperglycemics (From admission, onward)      Start     Stop Route Frequency Ordered    04/03/25 1245  insulin glargine U-100 (Lantus) pen 10 Units         -- SubQ 2 times daily 04/03/25 1136    04/01/25 2134  insulin aspart U-100 pen 0-5 Units         -- SubQ Before meals & nightly PRN 04/01/25 2035        Increase Glargine to 14 u BID   Hold Oral hypoglycemics while patient is in the hospital.

## 2025-04-09 NOTE — PROGRESS NOTES
[facility])  Nephrology  Progress Note    Patient Name: Mitch Whittaker  MRN: [id number]  Admission Date: 4/1/2025  Hospital Length of Stay: 8 days  Attending Provider: Zeenat Cordova MD   Primary Care Physician: Valery Caal MD  Principal Problem:Cellulitis of left lower extremity    Consults  Subjective:     The patient is a 69 y.o. male with a hx of ESRD who underwent living related kidney transplant about 10 years ago.  He has CKD stage 3 and is followed by Dr. Gonsalez of [facility].  History of CMV viremia with titer positive as recently as April of 2023 per outpatient notes from Dr. Gonsalez.  He has a multitude of chronic other medical conditions including CHF, type 2 diabetes, obesity etcetera.  He takes tacrolimus, prednisone, and mycophenolate for his immunosuppression.  Of note his mycophenolate was held for a period of time earlier in the year in the setting of his active C diff infection.  During his recent hospital stay this was assumed around March 20th.     Patient was most recently in the hospital about 2 weeks ago.  During the hospital stay he was treated with IV diuresis for acute on chronic heart failure.  He was also on oral vancomycin for recent C diff infection.  He was discharged March 22nd with a creatinine around 1.7.     Patient now presents yet again to the hospital April 1st and is admitted to the hospital by hospital medicine with lower extremity cellulitis.  We are consulted to assist in the care of this patient with renal transplant and CKD.  This morning his potassium was 3.3, BUN 35 and creatinine 1.9.    04/04/2025:  Patient was seen in his hospital room.  In bed resting comfortably.  No acute distress noted.  No new complaints from overnight.    04/05/2025:  Patient was seen in his hospital room.  In bed resting comfortably.  A family member was at the bedside assisting with breakfast.  No acute distress noted.  No new complaints from  overnight.    04/06/2025: Patient was seen in his hospital room.  In bed resting comfortably.  No acute distress noted.    04/07/2025: Patient was seen in his hospital room.  In bed resting comfortably.  No acute distress noted.  Somewhat disoriented this morning.    04/08/2025:  Seen in his hospital room, in bed resting comfortably.  Less interactive today.  A family member is at the bedside.  All nephrology related questions were answered to her satisfaction.    04/09/2025: Patient was seen in his hospital room.  In bed resting comfortably.  Working with nursing staff being bathed.  No acute distress noted.    Review of systems:  He does not answer questions this morning.    Review of patient's allergies indicates:   Allergen Reactions    Lisinopril Other (See Comments)     Other reaction(s):  cough    Actos  [pioglitazone] Other (See Comments)     Other reaction(s): CHF    Metformin Other (See Comments)     Other reaction(s): renal insuff  Other reaction(s): CHF     Current Facility-Administered Medications   Medication Frequency    0.9% NaCl infusion Continuous    acetaminophen suppository 650 mg Q6H PRN    acetaminophen tablet 650 mg Q6H PRN    allopurinol split tablet 50 mg Daily    apixaban tablet 5 mg BID    aspirin EC tablet 81 mg Daily    calcitRIOL capsule 0.25 mcg Daily    cephALEXin capsule 500 mg Q8H    dextrose 50% injection 12.5 g PRN    dextrose 50% injection 25 g PRN    ferrous sulfate tablet 1 each Daily    glucagon (human recombinant) injection 1 mg PRN    glucose chewable tablet 16 g PRN    glucose chewable tablet 24 g PRN    HYDROcodone-acetaminophen 5-325 mg per tablet 1 tablet Q6H PRN    insulin aspart U-100 pen 0-5 Units QID (AC + HS) PRN    insulin glargine U-100 (Lantus) pen 10 Units BID    melatonin tablet 6 mg Nightly PRN    metoprolol succinate (TOPROL-XL) 24 hr tablet 25 mg Daily    naloxone 0.4 mg/mL injection 0.02 mg PRN    ondansetron injection 4 mg Q8H PRN    pantoprazole EC tablet  40 mg Daily    polyethylene glycol packet 17 g Daily PRN    predniSONE tablet 5 mg Daily    promethazine tablet 25 mg Q6H PRN    QUEtiapine tablet 25 mg QHS    simethicone chewable tablet 80 mg QID PRN    sodium bicarbonate tablet 650 mg BID    sodium chloride 0.9% flush 3 mL Q12H PRN    tacrolimus capsule 3 mg Daily PM    tacrolimus capsule 4 mg Daily AM       Objective:     Vital Signs (Most Recent):  Temp: 97.8 °F (36.6 °C) (04/09/25 0752)  Pulse: 103 (04/09/25 0901)  Resp: 20 (04/09/25 0752)  BP: (!) 88/53 (04/09/25 0752)  SpO2: 97 % (04/09/25 0800) Vital Signs (24h Range):  Temp:  [97.8 °F (36.6 °C)-99.4 °F (37.4 °C)] 97.8 °F (36.6 °C)  Pulse:  [100-126] 103  Resp:  [18-22] 20  SpO2:  [96 %-98 %] 97 %  BP: ()/(53-71) 88/53     Weight: 103.9 kg (229 lb 0.9 oz) (04/02/25 2354)  Body mass index is 31.07 kg/m².  Body surface area is 2.3 meters squared.    I/O last 3 completed shifts:  In: 860.7 [P.O.:60; I.V.:800.7]  Out: 1000 [Urine:1000]    Physical Exam  Constitutional:       Appearance: Normal appearance.   HENT:      Head: Normocephalic and atraumatic.   Eyes:      General: No scleral icterus.     Extraocular Movements: Extraocular movements intact.      Pupils: Pupils are equal, round, and reactive to light.   Cardiovascular:      Rate and Rhythm: Normal rate and regular rhythm.   Pulmonary:      Effort: Pulmonary effort is normal.      Breath sounds: No stridor.   Musculoskeletal:      Right lower leg: Edema present.      Left lower leg: Edema present.   Skin:     General: Skin is warm and dry.   Neurological:      General: No focal deficit present.      Mental Status: He is alert and oriented to person, place, and time.   Psychiatric:         Mood and Affect: Mood normal.         Behavior: Behavior normal.         Significant Labs:sureBMP:   Recent Labs   Lab 04/04/25  0510 04/05/25  1109 04/09/25  0428   *   < > 136   K 3.1*   < > 3.6      < > 103   CO2 20*   < > 20*   BUN 38*   < > 54*    CREATININE 1.8*   < > 2.7*   CALCIUM 8.3*   < > 9.1   MG 1.8  --   --     < > = values in this interval not displayed.     CMP:   Recent Labs   Lab 04/09/25  0428   CALCIUM 9.1   ALBUMIN 1.6*      K 3.6   CO2 20*      BUN 54*   CREATININE 2.7*     All labs within the past 24 hours have been reviewed.    Significant Imaging:  Labs: Reviewed      Assessment/Plan:     Active Diagnoses:    Diagnosis Date Noted POA    PRINCIPAL PROBLEM:  Cellulitis of left lower extremity [L03.116] 04/02/2025 Yes    Acute encephalopathy [G93.40] 04/07/2025 No    Sepsis [A41.9] 04/02/2025 Yes    Hypomagnesemia [E83.42] 04/02/2025 Yes    Hypertension [I10] 04/02/2025 Yes     Chronic    Obesity (BMI 30-39.9) [E66.9] 04/02/2025 Yes     Chronic    History of DVT (deep vein thrombosis) [Z86.718] 04/02/2025 Not Applicable     Chronic    Weakness of both lower extremities [R29.898] 04/02/2025 Yes     Chronic    Anemia, chronic disease [D63.8] 03/01/2025 Yes     Chronic    Hyperlipidemia associated with type 2 diabetes mellitus [E11.69, E78.5] 12/13/2024 Yes     Chronic    Chronic venous insufficiency of lower extremity [I87.2]  Yes    Chronic combined systolic and diastolic congestive heart failure [I50.42] 03/15/2019 Yes     Chronic    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, with long-term current use of insulin [E11.3553, Z79.4] 03/27/2017 Not Applicable     Chronic    Chronic immunosuppression with Prograf, MMF and prednisone [Z29.89] 06/18/2015 Not Applicable     Chronic    Coronary artery disease of native artery of native heart with stable angina pectoris [I25.118]  Yes     Chronic    Obstructive sleep apnea [G47.33] 06/13/2013 Yes     Chronic      Problems Resolved During this Admission:    Diagnosis Date Noted Date Resolved POA    Thrombocytopenia [D69.6] 04/03/2025 04/06/2025 No    Hypokalemia [E87.6] 04/02/2025 04/06/2025 Yes       Assessment and plan:    1. Kidney transplant status:  In around living related donor transplant in 2015.  Serum creatinine has continuing to creep up, 2.7 this morning from 2.5 yesterday.    Urine studies were not revealing.  Will plan to continue gentle hydration.  Diuretics have been discontinued.    Baseline creatinine is usually between about 1.3 and 1.5.  Will continue to monitor.    2. Immunosuppression:  Prograf was recently decreased to 4 mg in the morning 3 mg in the evening based on level is 7.0.  Current Prograf level is pending.    3. Cellulitis:  Appears to be improving.  Defer to primary service for management.    4. Hypokalemia:  Potassium is 3.6.  Would continue to replace per protocol.      A minimum of 50 minutes was spent in patient examination, chart review and coordination of care with other providers.    Thank you for your consult.     Noel Jimenes MD  Nephrology  [facility] - Telemetry ([facility])

## 2025-04-09 NOTE — PROGRESS NOTES
[facility] Medicine  Progress Note    Patient Name: Mitch Whittaker  MRN: [id number]  Patient Class: IP- Inpatient   Admission Date: 4/1/2025  Length of Stay: 8 days  Attending Physician: Zeenat Cordova MD  Primary Care Provider: Valery Caal MD        Subjective     Principal Problem:Cellulitis of left lower extremity        HPI:  Mitch Whittaker is a 69 y.o. 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), Diabetic retinopathy, DM (diabetes mellitus), type 2 with complications (1994), Edema, End stage kidney disease, Gallbladder polyp, Heart failure, diastolic, due to HTN, Hemodialysis status, 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 who presented to the ED for further evaluation of worsening left leg pain and swelling which began last night.  Patient reports being bed-bound but suffers from chronic venous stasis and recurrent skin infections.  Patient reported symptoms began acutely with no known alleviating or aggravating factors noted with a associated symptoms including nonproductive cough in addition to those noted above.  He denied  endorsing any recent trauma to the affected area and reported being in his usual state of health prior to onset of symptoms.  All other review of systems negative except as noted above.  Initial workup in the ED revealed patient met SIRS criteria for sepsis with concerns for underlying cellulitis and was initiated on cefepime.  Remaining laboratory workup revealed H/H 9.6/31.8, potassium 2.8, creatinine/GFR 1.9/37, blood glucose 61, magnesium 1.1, troponin 0.152, lactic acid within normal limits, procalcitonin 1.60, chest x-ray negative for acute findings.  Patient admitted to Hospital Medicine inpatient for continued medical management.    PCP: Valery Caal      Overview/Hospital Course:    4/2/25  Patient admitted or LLE cellulitis, continue cefepime  Noted bilateral chronic venous stasis, Wound Care consulted  Reports BLE weakness x 2 months, difficulty ambulating  Replete K and Mg  PT/OT consult    4/3/25  Patient with dysphagia and early satiety, ST consulted  Hx of Tacrolimus induced GI toxicity  Upper GI FL pending  Patient with renal transplant, on tacrolimus and cellcept  Restart tacrolimus, consult Nephrology for assitance in transplant management  Continue cefepime for LLE celulitis  PT/OT with reccs for JOSE, consult SW for SNF placement    4/4/25  NAEON, patient resting in bed, no complaints  Blood cultures NGTD, check procal, possibly de-escalate cefepime 1-2 days  On IDDSI level 5 diet per  rec, appreciate assistance, will plan for outpatient GI referral  Stop famotidine, start Protonix, plan to continue on discharge    4/5/25  NAEON, AM labs pending  LLE cellulitis improving  Cr 1.8, Nephrology following, on Tacrolimus  Family at bedside, updated    4/6/25  NAEON, Cr 1.8 --> 2.3, tacrolimus level pending  Continue Keflex for cellulitis   Patient reports fatigue, generalized weakness  Consult PT/OT, may benefit from SNF/rehab    04/07/2025   -patient is seen and examined with wife and RN at  bedside.  Per wife, patient has been more confused over the weekend, reports pain all over and refusing some care, stating he feels like he is going to die.  On evaluation, he is oriented to self and place but not situation.  Refusing most of exam.  Question delirium secondary to hospitalization versus due to recent use of cefepime.    04/08/2025  No acute events overnight.  Patient with sinus tachycardia on tele monitor, T-max 100.4°.  Patient is seen and examined with nurse tech at bedside, no family present during morning rounds.  Patient awake but does not answer questions, when extremities are palpated he states please don't do that ma'a.m.  Moving all extremities spontaneously but does not follow commands.  We will obtain MRI brain further evaluate altered mental status.    04/09  MRI Brain with no acute findings. Per case management, family declined SNF placement for patient.   Pt mental status waxing/waning. He is slightly more conversant this morning, oriented x 1. Reports he did not sleep well overnight, reports there were demons in his room but that he feels better this AM.     Interval History: See Hospital Course     Review of Systems  Objective:     Vital Signs (Most Recent):  Temp: 97.5 °F (36.4 °C) (04/09/25 1202)  Pulse: 96 (04/09/25 1202)  Resp: 18 (04/09/25 1202)  BP: (!) 119/56 (04/09/25 1202)  SpO2: 97 % (04/09/25 1202) Vital Signs (24h Range):  Temp:  [97.5 °F (36.4 °C)-98.9 °F (37.2 °C)] 97.5 °F (36.4 °C)  Pulse:  [] 96  Resp:  [18-22] 18  SpO2:  [96 %-98 %] 97 %  BP: ()/(53-70) 119/56     Weight: 103.9 kg (229 lb 0.9 oz)  Body mass index is 31.07 kg/m².    Intake/Output Summary (Last 24 hours) at 4/9/2025 1344  Last data filed at 4/9/2025 0211  Gross per 24 hour   Intake 800.7 ml   Output 500 ml   Net 300.7 ml         Physical Exam  Vitals and nursing note reviewed.   Constitutional:       General: He is not in acute distress.     Appearance: He is ill-appearing (chronic).    Cardiovascular:      Rate and Rhythm: Normal rate and regular rhythm.      Heart sounds: No murmur heard.  Pulmonary:      Effort: Pulmonary effort is normal.      Breath sounds: Normal breath sounds. No wheezing or rales.   Abdominal:      General: Bowel sounds are normal. There is no distension.      Palpations: Abdomen is soft.      Tenderness: There is no abdominal tenderness.   Musculoskeletal:      Comments: Trace BLE edema    Skin:     Comments: Faint erythema to LLE    Neurological:      Mental Status: He is alert.      Comments: Oriented x 1               Significant Labs: All pertinent labs within the past 24 hours have been reviewed.    Significant Imaging: I have reviewed all pertinent imaging results/findings within the past 24 hours.      Assessment & Plan  Cellulitis of left lower extremity  Started on cefepime on admission 4/1/25  De-escalated to Keflex 4/5/25  Cellulitis appears to be improving   Continue p.o. Keflex   Continue wound care per Wound Care recommendations  Chronic venous insufficiency of lower extremity  Wound care  Sepsis  This patient does have evidence of infective focus  My overall impression is sepsis.  Source: Skin and Soft Tissue (location left leg cellulitis, gout of left knee)  Antibiotics given-   Antibiotics (72h ago, onward)      Start     Stop Route Frequency Ordered    04/05/25 2200  cephALEXin capsule 500 mg         -- Oral Every 8 hours 04/05/25 1654          Continue p.o. Keflex        Acute encephalopathy  Wife reports 2 day history of worsening confusion from patient's baseline, suspect hospital delirium versus medication induced as patient was recently on cefepime. Per chart review, pt has   Cefepime discontinued on 0 4/0 4   Minimize sedating meds   Continue Seroquel q.h.s. for possible delirium   Repeat UA negative for UTI, B12 elevated.  TSH, ammonia within normal limits.  Folic acid pending.  MRI brain with no acute findings     Weakness of both lower  "extremities  Patient reported worsening bilateral lower extremity weakness times 2-3 months in which he reported symptoms began acutely after his right leg gave out going to the restroom.  Patient denies endorsing any back pain, experiencing ground level fall/trauma, but does report fecal incontinence when urinating.  Patient has since been bed-bound in his not been seen/evaluated by a physician for these symptoms.  PT/OT evaluated and recommended moderate intensity therapy on discharge but family not interested in pursuing SNF placement at this time  Social work consulted and following  Chronic combined systolic and diastolic congestive heart failure  Hypertension  Patient has Combined Systolic and Diastolic heart failure that is Chronic. On presentation their CHF was well compensated. Most recent BNP and echo results are listed below.  No results for input(s): "BNP" in the last 72 hours.  Latest ECHO  Results for orders placed during the hospital encounter of 03/13/25    Echo Saline Bubble? No    Interpretation Summary    Left Ventricle: The left ventricle is normal in size. Mildly increased ventricular mass. Mildly increased wall thickness. There is mild concentric hypertrophy. Normal wall motion. Septal motion is consistent with bundle branch block. There is moderately reduced systolic function with a visually estimated ejection fraction of 35 - 40%. Grade I diastolic dysfunction.    Right Ventricle: The right ventricle is normal in size. Wall thickness is normal. Systolic function is normal.    Left Atrium: Mildly dilated    Aorta: Aortic annulus is mildly dilated measuring 4.01 cm. Ascending aorta is normal measuring 3.69 cm.    Pulmonary Artery: The estimated pulmonary artery systolic pressure is 24 mmHg.    IVC/SVC: Normal venous pressure at 3 mmHg.    Current Heart Failure Medications  metoprolol succinate (TOPROL-XL) 24 hr tablet 25 mg, Daily, Oral    Plan  -Monitor strict I&Os and daily weights.    -Place " on telemetry  -Low sodium diet  -Place on fluid restriction of 1.5 L.   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  Last A1c reviewed-   Lab Results   Component Value Date    HGBA1C 5.7 (H) 12/17/2024     Most recent fingerstick glucose reviewed-   Recent Labs   Lab 04/08/25  1632 04/09/25  0545 04/09/25  1134   POCTGLUCOSE 129* 255* 212*     Current correctional scale  Medium  Increase anti-hyperglycemic dose as follows-   Antihyperglycemics (From admission, onward)      Start     Stop Route Frequency Ordered    04/03/25 1245  insulin glargine U-100 (Lantus) pen 10 Units         -- SubQ 2 times daily 04/03/25 1136    04/01/25 2134  insulin aspart U-100 pen 0-5 Units         -- SubQ Before meals & nightly PRN 04/01/25 2035        Increase Glargine to 14 u BID   Hold Oral hypoglycemics while patient is in the hospital.      Anemia, chronic disease  Anemia is likely due to chronic disease due to Chronic Kidney Disease. Most recent hemoglobin and hematocrit are listed below.  Recent Labs     04/07/25  0449 04/08/25  0430 04/09/25  0428   HGB 7.7* 9.0* 8.8*   HCT 26.3* 31.4* 30.6*     Plan  -Monitor serial CBC: Daily  -Transfuse PRBC if patient becomes hemodynamically unstable, symptomatic or H/H drops below 7/21.  -Patient has not received any PRBC transfusions to date  -Patient's anemia is currently stable    Coronary artery disease of native artery of native heart with stable angina pectoris  Patient with known CAD, which is controlled.  EKG negative for signs of acute ischemia.  Troponin elevated at 0.15 to likely secondary to demand ischemia in setting of sepsis and CHF. Will continue  home  medications  and monitor for S/Sx of angina/ACS. Continue to monitor on telemetry.    Chronic immunosuppression with Prograf, MMF and prednisone  History of renal transplant  Patient currently on Prograf, CellCept, and mycophenolate.  Plan:  -continue Prograf and CellCept  -holding mycophenolate  Nephrology consulted for management of immunosuppressant medications ; defer management  Monitor Prograf level per renal  Started on IVF per renal given increase in CR     History of DVT (deep vein thrombosis)  Currently on Eliquis outpatient.  Plan:  -continue home medication  -monitor H/H    Obstructive sleep apnea  Currently on CPAP outpatient.  Plan:  -continue CPAP q.h.s.    VTE Risk Mitigation (From admission, onward)           Ordered     apixaban tablet 5 mg  2 times daily         04/03/25 1141     Reason for No Pharmacological VTE Prophylaxis  Once        Question:  Reasons:  Answer:  Already adequately anticoagulated on oral Anticoagulants    04/01/25 2035     IP VTE HIGH RISK PATIENT  Once         04/01/25 2035     Place sequential compression device  Until discontinued         04/01/25 2035                    Discharge Planning   GRETEL: 4/11/2025     Code Status: Full Code   Medical Readiness for Discharge Date:   Discharge Plan A: Home Health, Home with family                Please place Justification for DME        Zeenat Cordova MD  Department of Hospital Medicine   [facility] - Telemetry ([facility])

## 2025-04-09 NOTE — ASSESSMENT & PLAN NOTE
Patient currently on Prograf, CellCept, and mycophenolate.  Plan:  -continue Prograf and CellCept  -holding mycophenolate  Nephrology consulted for management of immunosuppressant medications ; defer management  Monitor Prograf level per renal  Started on IVF per renal given increase in CR

## 2025-04-09 NOTE — ASSESSMENT & PLAN NOTE
Anemia is likely due to chronic disease due to Chronic Kidney Disease. Most recent hemoglobin and hematocrit are listed below.  Recent Labs     04/07/25  0449 04/08/25  0430 04/09/25  0428   HGB 7.7* 9.0* 8.8*   HCT 26.3* 31.4* 30.6*     Plan  -Monitor serial CBC: Daily  -Transfuse PRBC if patient becomes hemodynamically unstable, symptomatic or H/H drops below 7/21.  -Patient has not received any PRBC transfusions to date  -Patient's anemia is currently stable

## 2025-04-09 NOTE — PLAN OF CARE
Nutrition Recommendation/Prescription      Continue current diet (Diet Minced & Moist (IDDSI Level 5) Consistent Carbohydrate, Renal Non-Dialysis; 2000 Calories (up to 75 gm per meal)) as tolerated.   2. Add Boost Glucose Control (provides 190 kcal, 16 g protein per serving) BID  3. Continue medical management of hyperglycemia  4. Monitor PO intakes, labs, and wt changes  5. Recommended updated wts twice weekly    Nutrition Interventions      Intervention(s): modified composition of meals/snacks and commercial beverage      Goal: Meet greater than 80% of nutritional needs by follow-up. (new)  Goal: Consume % of oral supplements by follow-up. (new)    Irina Mcarthur, MS, RD, LDN

## 2025-04-09 NOTE — ASSESSMENT & PLAN NOTE
Wife reports 2 day history of worsening confusion from patient's baseline, suspect hospital delirium versus medication induced as patient was recently on cefepime. Per chart review, pt has   Cefepime discontinued on 0 4/0 4   Minimize sedating meds   Continue Seroquel q.h.s. for possible delirium   Repeat UA negative for UTI, B12 elevated.  TSH, ammonia within normal limits.  Folic acid pending.  MRI brain with no acute findings

## 2025-04-10 ENCOUNTER — PATIENT MESSAGE (OUTPATIENT)
Dept: ALLERGY | Facility: CLINIC | Age: 70
End: 2025-04-10
Payer: COMMERCIAL

## 2025-04-10 LAB
ABSOLUTE NEUTROPHIL MANUAL (OHS): 0.5 K/UL
ALBUMIN SERPL BCP-MCNC: 1.5 G/DL (ref 3.5–5.2)
ANION GAP (OHS): 10 MMOL/L (ref 8–16)
BUN SERPL-MCNC: 54 MG/DL (ref 8–23)
BURR CELLS BLD QL SMEAR: ABNORMAL
CALCIUM SERPL-MCNC: 9 MG/DL (ref 8.7–10.5)
CHLORIDE SERPL-SCNC: 105 MMOL/L (ref 95–110)
CO2 SERPL-SCNC: 23 MMOL/L (ref 23–29)
CREAT SERPL-MCNC: 2.6 MG/DL (ref 0.5–1.4)
DACRYOCYTES BLD QL SMEAR: ABNORMAL
EOSINOPHIL NFR BLD MANUAL: 1 % (ref 0–8)
ERYTHROCYTE [DISTWIDTH] IN BLOOD BY AUTOMATED COUNT: 17.3 % (ref 11.5–14.5)
GFR SERPLBLD CREATININE-BSD FMLA CKD-EPI: 26 ML/MIN/1.73/M2
GLUCOSE SERPL-MCNC: 144 MG/DL (ref 70–110)
HCT VFR BLD AUTO: 28.2 % (ref 40–54)
HGB BLD-MCNC: 8 GM/DL (ref 14–18)
LYMPHOCYTES NFR BLD MANUAL: 59 % (ref 18–48)
MCH RBC QN AUTO: 24.2 PG (ref 27–31)
MCHC RBC AUTO-ENTMCNC: 28.4 G/DL (ref 32–36)
MCV RBC AUTO: 85 FL (ref 82–98)
METAMYELOCYTES NFR BLD MANUAL: 1 %
MONOCYTES NFR BLD MANUAL: 14 % (ref 4–15)
NEUTROPHILS NFR BLD MANUAL: 22 % (ref 38–73)
NEUTS BAND NFR BLD MANUAL: 3 %
NUCLEATED RBC (/100WBC) (OHS): 1 /100 WBC
OHS QRS DURATION: 148 MS
OHS QTC CALCULATION: 527 MS
OVALOCYTES BLD QL SMEAR: ABNORMAL
PHOSPHATE SERPL-MCNC: 3.8 MG/DL (ref 2.7–4.5)
PLATELET # BLD AUTO: 208 K/UL (ref 150–450)
PLATELET BLD QL SMEAR: ABNORMAL
PMV BLD AUTO: 10.5 FL (ref 9.2–12.9)
POCT GLUCOSE: 122 MG/DL (ref 70–110)
POCT GLUCOSE: 129 MG/DL (ref 70–110)
POCT GLUCOSE: 153 MG/DL (ref 70–110)
POCT GLUCOSE: 84 MG/DL (ref 70–110)
POTASSIUM SERPL-SCNC: 3.3 MMOL/L (ref 3.5–5.1)
RBC # BLD AUTO: 3.31 M/UL (ref 4.6–6.2)
SCHISTOCYTES BLD QL SMEAR: ABNORMAL
SODIUM SERPL-SCNC: 138 MMOL/L (ref 136–145)
WBC # BLD AUTO: 1.92 K/UL (ref 3.9–12.7)

## 2025-04-10 PROCEDURE — 85025 COMPLETE CBC W/AUTO DIFF WBC: CPT | Performed by: INTERNAL MEDICINE

## 2025-04-10 PROCEDURE — 25000003 PHARM REV CODE 250: Performed by: HOSPITALIST

## 2025-04-10 PROCEDURE — 82040 ASSAY OF SERUM ALBUMIN: CPT | Performed by: INTERNAL MEDICINE

## 2025-04-10 PROCEDURE — 63600175 PHARM REV CODE 636 W HCPCS: Performed by: INTERNAL MEDICINE

## 2025-04-10 PROCEDURE — 63600175 PHARM REV CODE 636 W HCPCS: Performed by: HOSPITALIST

## 2025-04-10 PROCEDURE — 25000003 PHARM REV CODE 250: Performed by: INTERNAL MEDICINE

## 2025-04-10 PROCEDURE — 99233 SBSQ HOSP IP/OBS HIGH 50: CPT | Mod: ,,, | Performed by: INTERNAL MEDICINE

## 2025-04-10 PROCEDURE — 21400001 HC TELEMETRY ROOM

## 2025-04-10 PROCEDURE — 25000003 PHARM REV CODE 250: Performed by: NURSE PRACTITIONER

## 2025-04-10 PROCEDURE — 36415 COLL VENOUS BLD VENIPUNCTURE: CPT | Performed by: INTERNAL MEDICINE

## 2025-04-10 PROCEDURE — 97530 THERAPEUTIC ACTIVITIES: CPT

## 2025-04-10 RX ORDER — TACROLIMUS 1 MG/1
3 CAPSULE ORAL EVERY MORNING
Status: DISCONTINUED | OUTPATIENT
Start: 2025-04-10 | End: 2025-04-11 | Stop reason: HOSPADM

## 2025-04-10 RX ORDER — POTASSIUM CHLORIDE 20 MEQ/1
20 TABLET, EXTENDED RELEASE ORAL ONCE
Status: COMPLETED | OUTPATIENT
Start: 2025-04-10 | End: 2025-04-10

## 2025-04-10 RX ORDER — SODIUM CHLORIDE 9 MG/ML
INJECTION, SOLUTION INTRAVENOUS CONTINUOUS
Status: DISCONTINUED | OUTPATIENT
Start: 2025-04-10 | End: 2025-04-11

## 2025-04-10 RX ORDER — INSULIN GLARGINE 100 [IU]/ML
16 INJECTION, SOLUTION SUBCUTANEOUS 2 TIMES DAILY
Status: DISCONTINUED | OUTPATIENT
Start: 2025-04-10 | End: 2025-04-11 | Stop reason: HOSPADM

## 2025-04-10 RX ORDER — MAGNESIUM SULFATE HEPTAHYDRATE 40 MG/ML
2 INJECTION, SOLUTION INTRAVENOUS ONCE
Status: COMPLETED | OUTPATIENT
Start: 2025-04-10 | End: 2025-04-10

## 2025-04-10 RX ADMIN — INSULIN GLARGINE 14 UNITS: 100 INJECTION, SOLUTION SUBCUTANEOUS at 09:04

## 2025-04-10 RX ADMIN — ASPIRIN 81 MG: 81 TABLET, COATED ORAL at 09:04

## 2025-04-10 RX ADMIN — FERROUS SULFATE TAB 325 MG (65 MG ELEMENTAL FE) 1 EACH: 325 (65 FE) TAB at 09:04

## 2025-04-10 RX ADMIN — MAGNESIUM SULFATE HEPTAHYDRATE 2 G: 40 INJECTION, SOLUTION INTRAVENOUS at 09:04

## 2025-04-10 RX ADMIN — CEPHALEXIN 500 MG: 500 CAPSULE ORAL at 01:04

## 2025-04-10 RX ADMIN — TACROLIMUS 3 MG: 1 CAPSULE ORAL at 08:04

## 2025-04-10 RX ADMIN — SODIUM CHLORIDE: 9 INJECTION, SOLUTION INTRAVENOUS at 10:04

## 2025-04-10 RX ADMIN — POTASSIUM CHLORIDE 20 MEQ: 1500 TABLET, EXTENDED RELEASE ORAL at 09:04

## 2025-04-10 RX ADMIN — APIXABAN 5 MG: 2.5 TABLET, FILM COATED ORAL at 09:04

## 2025-04-10 RX ADMIN — SODIUM BICARBONATE 650 MG: 650 TABLET ORAL at 09:04

## 2025-04-10 RX ADMIN — METOPROLOL SUCCINATE 25 MG: 25 TABLET, EXTENDED RELEASE ORAL at 09:04

## 2025-04-10 RX ADMIN — PREDNISONE 5 MG: 5 TABLET ORAL at 09:04

## 2025-04-10 RX ADMIN — SODIUM CHLORIDE: 9 INJECTION, SOLUTION INTRAVENOUS at 01:04

## 2025-04-10 RX ADMIN — PANTOPRAZOLE SODIUM 40 MG: 40 TABLET, DELAYED RELEASE ORAL at 09:04

## 2025-04-10 RX ADMIN — SODIUM CHLORIDE: 9 INJECTION, SOLUTION INTRAVENOUS at 11:04

## 2025-04-10 RX ADMIN — TACROLIMUS 3 MG: 1 CAPSULE ORAL at 05:04

## 2025-04-10 RX ADMIN — QUETIAPINE FUMARATE 25 MG: 25 TABLET ORAL at 09:04

## 2025-04-10 RX ADMIN — ALLOPURINOL 50 MG: 300 TABLET ORAL at 09:04

## 2025-04-10 NOTE — ASSESSMENT & PLAN NOTE
Patient currently on Prograf, CellCept, and mycophenolate.  -holding mycophenolate  Nephrology consulted for management of immunosuppressant medications ; defer management  Monitor Prograf level per renal  Discussed with Dr. Jimenes- Cr improving,will continue gentle IV fluids for now

## 2025-04-10 NOTE — SIGNIFICANT EVENT
At 2112  10 beat run of VT reported  Patient asymptomatic  Per MAR, metoprolol was held earlier today due to low BP  Current /59 and   Will give metoprolol now and check BMP/magnesium level    2249 follow up of labs:  K 3.6 and Mg 1.7  Will order 20 mEq po potassium chloride and 400 mg po mag ox

## 2025-04-10 NOTE — ASSESSMENT & PLAN NOTE
Patient's FSGs are controlled on current medication regimen.  Last A1c reviewed-   Lab Results   Component Value Date    HGBA1C 5.7 (H) 12/17/2024     Most recent fingerstick glucose reviewed-   Recent Labs   Lab 04/09/25  1748 04/09/25  2056 04/10/25  1148   POCTGLUCOSE 258* 253* 153*     Current correctional scale  Medium  Increase anti-hyperglycemic dose as follows-   Antihyperglycemics (From admission, onward)      Start     Stop Route Frequency Ordered    04/09/25 2100  insulin glargine U-100 (Lantus) pen 14 Units         -- SubQ 2 times daily 04/09/25 1354    04/01/25 2134  insulin aspart U-100 pen 0-5 Units         -- SubQ Before meals & nightly PRN 04/01/25 2035        Increase Glargine to 16 u BID   Hold Oral hypoglycemics while patient is in the hospital.

## 2025-04-10 NOTE — ASSESSMENT & PLAN NOTE
Wife reports 2 day history of worsening confusion from patient's baseline, suspect hospital delirium versus medication induced as patient was recently on cefepime. Per chart review, pt has   Cefepime discontinued on 0 4/0 4   Minimize sedating meds   Continue Seroquel q.h.s. for possible delirium   Repeat UA negative for UTI, B12 elevated.  TSH, ammonia within normal limits.  Folic acid wnl.  MRI brain with no acute findings   Mental stauts improving, suspect likely delirium

## 2025-04-10 NOTE — NURSING
2100- Notified by MT 10 beat run of vtach, pt resting in bed. Eve NP notified. See orders.  2130- Pt uncooperative, refusing metoprolol  2300- Pt remains uncooperative, refusing magnesium and potassium. Becomes agitated when speaking with him.

## 2025-04-10 NOTE — PLAN OF CARE
Problem: Adult Inpatient Plan of Care  Goal: Absence of Hospital-Acquired Illness or Injury  Outcome: Ongoing     Problem: Diabetes Comorbidity  Goal: Blood Glucose Level Within Targeted Range  Outcome: Ongoing     Problem: Confusion Acute  Goal: Optimal Cognitive Function  Outcome: Ongoing

## 2025-04-10 NOTE — ASSESSMENT & PLAN NOTE
Anemia is likely due to chronic disease due to Chronic Kidney Disease. Most recent hemoglobin and hematocrit are listed below.  Recent Labs     04/08/25  0430 04/09/25  0428 04/10/25  0432   HGB 9.0* 8.8* 8.0*   HCT 31.4* 30.6* 28.2*     Plan  -Monitor serial CBC: Daily  -Transfuse PRBC if patient becomes hemodynamically unstable, symptomatic or H/H drops below 7/21.  -Patient has not received any PRBC transfusions to date  -Patient's anemia is currently stable

## 2025-04-10 NOTE — PROGRESS NOTES
[facility]  Progress Note    Patient Name: Mitch Whittaker  MRN: [id number]  Patient Class: IP- Inpatient   Admission Date: 4/1/2025  Length of Stay: 9 days  Attending Physician: Zeenat Cordova MD  Primary Care Provider: Valery Caal MD        Subjective     Principal Problem:Cellulitis of left lower extremity        HPI:  Mitch Whittaker is a 69 y.o. 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), Diabetic retinopathy, DM (diabetes mellitus), type 2 with complications (1994), Edema, End stage kidney disease, Gallbladder polyp, Heart failure, diastolic, due to HTN, Hemodialysis status, 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 who presented to the ED for further evaluation of worsening left leg pain and swelling which began last night.  Patient reports being bed-bound but suffers from chronic venous stasis and recurrent skin infections.  Patient reported symptoms began acutely with no known alleviating or aggravating factors noted with a associated symptoms including nonproductive cough in addition to those noted above.  He denied  endorsing any recent trauma to the affected area and reported being in his usual state of health prior to onset of symptoms.  All other review of systems negative except as noted above.  Initial workup in the ED revealed patient met SIRS criteria for sepsis with concerns for underlying cellulitis and was initiated on cefepime.  Remaining laboratory workup revealed H/H 9.6/31.8, potassium 2.8, creatinine/GFR 1.9/37, blood glucose 61, magnesium 1.1, troponin 0.152, lactic acid within normal limits, procalcitonin 1.60, chest x-ray negative for acute findings.  Patient admitted to Hospital Medicine inpatient for continued medical management.    PCP: Valery Caal      Overview/Hospital Course:    4/2/25  Patient admitted or LLE cellulitis, continue cefepime  Noted bilateral chronic venous stasis, Wound Care consulted  Reports BLE weakness x 2 months, difficulty ambulating  Replete K and Mg  PT/OT consult    4/3/25  Patient with dysphagia and early satiety, ST consulted  Hx of Tacrolimus induced GI toxicity  Upper GI FL pending  Patient with renal transplant, on tacrolimus and cellcept  Restart tacrolimus, consult Nephrology for assitance in transplant management  Continue cefepime for LLE celulitis  PT/OT with reccs for JOSE, consult SW for SNF placement    4/4/25  NAEON, patient resting in bed, no complaints  Blood cultures NGTD, check procal, possibly de-escalate cefepime 1-2 days  On IDDSI level 5 diet per  rec, appreciate assistance, will plan for outpatient GI referral  Stop famotidine, start Protonix, plan to continue on discharge    4/5/25  NAEON, AM labs pending  LLE cellulitis improving  Cr 1.8, Nephrology following, on Tacrolimus  Family at bedside, updated    4/6/25  NAEON, Cr 1.8 --> 2.3, tacrolimus level pending  Continue Keflex for cellulitis   Patient reports fatigue, generalized weakness  Consult PT/OT, may benefit from SNF/rehab    04/07/2025   -patient is seen and examined with wife and RN at  bedside.  Per wife, patient has been more confused over the weekend, reports pain all over and refusing some care, stating he feels like he is going to die.  On evaluation, he is oriented to self and place but not situation.  Refusing most of exam.  Question delirium secondary to hospitalization versus due to recent use of cefepime.    04/08/2025  No acute events overnight.  Patient with sinus tachycardia on tele monitor, T-max 100.4°.  Patient is seen and examined with nurse tech at bedside, no family present during morning rounds.  Patient awake but does not answer questions, when extremities are palpated he states please don't do that ma'a.m.  Moving all extremities spontaneously but does not follow commands.  We will obtain MRI brain further evaluate altered mental status.    04/09  MRI Brain with no acute findings. Per case management, family declined SNF placement for patient.   Pt mental status waxing/waning. He is slightly more conversant this morning, oriented x 1. Reports he did not sleep well overnight, reports there were demons in his room but that he feels better this AM.     04/10/2025  Pt seen and examined with therapists at bedside, he is much more alert and coherent today. Oriented to self, place, year, answers questions appropriately. Reports some L leg pain. Denies CP, SOB. Discussed with nephrology who recommend continuation of IVF as pt po intake is poor and Cr downtrending with hydration     Interval History:  See hospital course     Review of Systems  Objective:     Vital Signs (Most Recent):  Temp: 97.8 °F (36.6 °C) (04/10/25 1230)  Pulse: 101 (04/10/25 1353)  Resp: 18 (04/10/25 1230)  BP: 121/61 (04/10/25 1230)  SpO2: 97 % (04/10/25 1230) Vital Signs (24h Range):  Temp:  [97.4 °F (36.3 °C)-98 °F (36.7 °C)] 97.8 °F (36.6 °C)  Pulse:  [] 101  Resp:  [18-20] 18  SpO2:  [97 %-100 %] 97 %  BP: (118-131)/(57-61) 121/61     Weight: 103.9 kg (229 lb 0.9 oz)  Body mass index is 31.07  kg/m².    Intake/Output Summary (Last 24 hours) at 4/10/2025 1445  Last data filed at 4/10/2025 1230  Gross per 24 hour   Intake --   Output 750 ml   Net -750 ml         Physical Exam  Vitals and nursing note reviewed. Exam conducted with a chaperone present.   Constitutional:       General: He is not in acute distress.     Appearance: He is ill-appearing.   Cardiovascular:      Rate and Rhythm: Normal rate and regular rhythm.      Heart sounds: No murmur heard.  Pulmonary:      Effort: Pulmonary effort is normal.      Breath sounds: Normal breath sounds. No wheezing or rhonchi.   Abdominal:      General: Bowel sounds are normal. There is no distension.      Palpations: Abdomen is soft.      Tenderness: There is no abdominal tenderness.   Musculoskeletal:      Comments: Trace BLE edema    Neurological:      Mental Status: He is alert.      Comments: Oriented to self, place, month year                Significant Labs: All pertinent labs within the past 24 hours have been reviewed.    Significant Imaging: I have reviewed all pertinent imaging results/findings within the past 24 hours.      Assessment & Plan  Cellulitis of left lower extremity  Started on cefepime on admission 4/1/25  De-escalated to Keflex 4/5/25  Cellulitis appears to be improving   Continue p.o. Keflex   Continue wound care per Wound Care recommendations  Chronic venous insufficiency of lower extremity  Wound care  Sepsis  This patient does have evidence of infective focus  My overall impression is sepsis.  Source: Skin and Soft Tissue (location left leg cellulitis, gout of left knee)  Antibiotics given-   Antibiotics (72h ago, onward)      Start     Stop Route Frequency Ordered    04/05/25 2200  cephALEXin capsule 500 mg         -- Oral Every 8 hours 04/05/25 1654          Continue p.o. Keflex        Acute encephalopathy  Wife reports 2 day history of worsening confusion from patient's baseline, suspect hospital delirium versus medication induced  "as patient was recently on cefepime. Per chart review, pt has   Cefepime discontinued on 0 4/0 4   Minimize sedating meds   Continue Seroquel q.h.s. for possible delirium   Repeat UA negative for UTI, B12 elevated.  TSH, ammonia within normal limits.  Folic acid wnl.  MRI brain with no acute findings   Mental stauts improving, suspect likely delirium     Weakness of both lower extremities  Patient reported worsening bilateral lower extremity weakness times 2-3 months in which he reported symptoms began acutely after his right leg gave out going to the restroom.  Patient denies endorsing any back pain, experiencing ground level fall/trauma, but does report fecal incontinence when urinating.  Patient has since been bed-bound in his not been seen/evaluated by a physician for these symptoms.  PT/OT evaluated and recommended moderate intensity therapy on discharge but family not interested in pursuing SNF placement at this time  Social work consulted and following  Chronic combined systolic and diastolic congestive heart failure  Hypertension  Patient has Combined Systolic and Diastolic heart failure that is Chronic. On presentation their CHF was well compensated. Most recent BNP and echo results are listed below.  No results for input(s): "BNP" in the last 72 hours.  Latest ECHO  Results for orders placed during the hospital encounter of 03/13/25    Echo Saline Bubble? No    Interpretation Summary    Left Ventricle: The left ventricle is normal in size. Mildly increased ventricular mass. Mildly increased wall thickness. There is mild concentric hypertrophy. Normal wall motion. Septal motion is consistent with bundle branch block. There is moderately reduced systolic function with a visually estimated ejection fraction of 35 - 40%. Grade I diastolic dysfunction.    Right Ventricle: The right ventricle is normal in size. Wall thickness is normal. Systolic function is normal.    Left Atrium: Mildly dilated    Aorta: Aortic " annulus is mildly dilated measuring 4.01 cm. Ascending aorta is normal measuring 3.69 cm.    Pulmonary Artery: The estimated pulmonary artery systolic pressure is 24 mmHg.    IVC/SVC: Normal venous pressure at 3 mmHg.    Current Heart Failure Medications  metoprolol succinate (TOPROL-XL) 24 hr tablet 25 mg, Daily, Oral    Plan  -Monitor strict I&Os and daily weights.    -Place on telemetry  -Low sodium diet  -Place on fluid restriction of 1.5 L.   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  Last A1c reviewed-   Lab Results   Component Value Date    HGBA1C 5.7 (H) 12/17/2024     Most recent fingerstick glucose reviewed-   Recent Labs   Lab 04/09/25  1748 04/09/25  2056 04/10/25  1148   POCTGLUCOSE 258* 253* 153*     Current correctional scale  Medium  Increase anti-hyperglycemic dose as follows-   Antihyperglycemics (From admission, onward)      Start     Stop Route Frequency Ordered    04/09/25 2100  insulin glargine U-100 (Lantus) pen 14 Units         -- SubQ 2 times daily 04/09/25 1354    04/01/25 2134  insulin aspart U-100 pen 0-5 Units         -- SubQ Before meals & nightly PRN 04/01/25 2035        Increase Glargine to 16 u BID   Hold Oral hypoglycemics while patient is in the hospital.      Anemia, chronic disease  Anemia is likely due to chronic disease due to Chronic Kidney Disease. Most recent hemoglobin and hematocrit are listed below.  Recent Labs     04/08/25  0430 04/09/25  0428 04/10/25  0432   HGB 9.0* 8.8* 8.0*   HCT 31.4* 30.6* 28.2*     Plan  -Monitor serial CBC: Daily  -Transfuse PRBC if patient becomes hemodynamically unstable, symptomatic or H/H drops below 7/21.  -Patient  has not received any PRBC transfusions to date  -Patient's anemia is currently stable    Coronary artery disease of native artery of native heart with stable angina pectoris  Patient with known CAD, which is controlled.  EKG negative for signs of acute ischemia.  Troponin elevated at 0.15 to likely secondary to demand ischemia in setting of sepsis and CHF. Will continue  home medications  and monitor for S/Sx of angina/ACS. Continue to monitor on telemetry.    Chronic immunosuppression with Prograf, MMF and prednisone  History of renal transplant  Patient currently on Prograf, CellCept, and mycophenolate.  -holding mycophenolate  Nephrology consulted for management of immunosuppressant medications ; defer management  Monitor Prograf level per renal  Discussed with Dr. Jimenes- Cr improving,will continue gentle IV fluids for now         History of DVT (deep vein thrombosis)  Currently on Eliquis outpatient.  Plan:  -continue home medication  -monitor H/H    Obstructive sleep apnea  Currently on CPAP outpatient.  Plan:  -continue CPAP q.h.s.    Hyperlipidemia associated with type 2 diabetes mellitus  Patient is chronically on statin.will continue for now. Last Lipid Panel:   Lab Results   Component Value Date    CHOL 175 12/17/2024    HDL 50 12/17/2024    LDLCALC 98.2 12/17/2024    TRIG 134 12/17/2024    CHOLHDL 28.6 12/17/2024     Plan:  -Continue home medication  -low fat/low calorie diet    Hypomagnesemia  Patient has Abnormal Magnesium: hypomagnesemia. Will continue to monitor electrolytes closely. Will replace the affected electrolytes and repeat labs to be done after interventions completed. The patient's magnesium results have been reviewed and are listed below.  Recent Labs   Lab 04/09/25  2157   MG 1.7         Obesity (BMI 30-39.9)  Body mass index is 31.07 kg/m². Morbid obesity complicates all aspects of disease management from diagnostic modalities to treatment. Weight loss encouraged and health benefits  explained to patient.     VTE Risk Mitigation (From admission, onward)           Ordered     apixaban tablet 5 mg  2 times daily         04/03/25 1141     Reason for No Pharmacological VTE Prophylaxis  Once        Question:  Reasons:  Answer:  Already adequately anticoagulated on oral Anticoagulants    04/01/25 2035     IP VTE HIGH RISK PATIENT  Once         04/01/25 2035     Place sequential compression device  Until discontinued         04/01/25 2035                    Discharge Planning   GRETEL: 4/11/2025     Code Status: Full Code   Medical Readiness for Discharge Date:   Discharge Plan A: Home Health, Home with family                Please place Justification for DME        Zeenat Cordova MD  Department of Hospital Medicine   [facility] - Telemetry ([facility])

## 2025-04-10 NOTE — PLAN OF CARE
Pt tolerated interventions poor. Attempted sup>sit, pt completed R rolling and moved LE to EOB with MAX A of 2. Pt then refused further progression of mobility. Since EVAL, pt unable to progress towards goals, poor tolerance to activity, and poor participation. Pt will be d/c from acute therapy services due to these reasons. Please re-consult if situation changes.

## 2025-04-10 NOTE — PT/OT/SLP PROGRESS
"Physical Therapy Treatment and Discharge    Patient Name:  Mitch Whittaker   MRN:  [id number]    Recommendations:     Discharge Recommendations: Low Intensity Therapy (for caregiver training)  Discharge Equipment Recommendations: to be determined by next level of care  Barriers to discharge: None    Assessment:     Mitch Whittaker is a 69 y.o. male admitted with a medical diagnosis of Cellulitis of left lower extremity. Since EVAL, pt with continued poor tolerance to activity, poor participation with therapy, and has not made any progress towards therapy goals. Pt with multiple refusals over the course of this hospitalization. Unable to tolerate therapy interventions. Pt is not appropriate for continued therapy intervention at this time. Pt to be d/c from acute therapy services.     Recent Surgery: * No surgery found *      Plan:     Pt to be d/c from acute therapy services due to continued poor participation, poor activity tolerance, and inability to progress towards therapy goals. Please re-consult if situation changes.    Subjective     Chief Complaint: Pt initially agreeable, after initiating sup>sit pt refused reporting, "No I can't do it, put my legs back."  Patient/Family Comments/goals: none stated  Pain/Comfort:  Pain Rating 1: 10/10  Location - Side 1: Left  Location - Orientation 1: generalized  Location 1: leg  Pain Addressed 1: Cessation of Activity, Reposition, Distraction  Pain Rating Post-Intervention 1: 10/10      Objective:     Communicated with epic chart review prior to session.  Patient found supine with peripheral IV, telemetry, PureWick upon PT entry to room.     General Precautions: Standard, fall, special contact  Orthopedic Precautions: N/A  Braces: N/A  Respiratory Status: Room air     Functional Mobility:  Gait Belt Applied - N/A   Socks/Shoes Donned - N/A  Bed Mobility  Rolling Right: maximal assistance and of 2 persons  Supine Scooting: total assistance and of 2 persons  Supine to Sit: " "attempted but unable to complete, pt rolled to R with MAX A of 2, pt LE brought to EOB, pt then refused to continue bed mobility due to severe pain. Pt refused further repositioning due to pain, therapist educated on importance of proper positioning to reduce pain, pt agreed to reposition in bed. Pt with severe pain with movement and to touch.   Transfers  Pt refused    AM-PAC 6 CLICK MOBILITY  Turning over in bed (including adjusting bedclothes, sheets and blankets)?: 2  Sitting down on and standing up from a chair with arms (e.g., wheelchair, bedside commode, etc.): 1  Moving from lying on back to sitting on the side of the bed?: 1  Moving to and from a bed to a chair (including a wheelchair)?: 1  Need to walk in hospital room?: 1  Climbing 3-5 steps with a railing?: 1  Basic Mobility Total Score: 7       Treatment & Education:  Reviewed role of PT in acute care and POC. Pt tolerated interventions poor. Reviewed importance of EOB activities, activity pacing, and HEP (marching/hip flex, hip abd, heel slides/LAQ, quad sets, ankle pumps) in order to maintain/regain strength. Encouraged to sit up for all meals. Encouraged frequent position changes to prevent pressure injury. Reviewed "call don't fall" policy and increased risk of falling due to weakness, instructed to utilize call bell for assistance with all transfers. Pt agreeable to all requests.    Patient left HOB elevated with all lines intact, call button in reach, and bed alarm on..    GOALS:   Multidisciplinary Problems       Physical Therapy Goals          Problem: Physical Therapy    Goal Priority Disciplines Outcome Interventions   Physical Therapy Goal     PT, PT/OT Unable to Meet    Description: Pt will perform bed mobility with mod A in order to participate in EOB activity.  Pt will perform transfers with mod A in order to participate in OOB activity.  Pt will ambulate 50 ft mod I with LRAD in order to participate in daily tasks.   Pt will tolerate " sitting OOB in chair x 2-4 hrs in order to participate in daily tasks.                        Time Tracking:     PT Received On: 04/10/25  PT Start Time: 0908     PT Stop Time: 0938  PT Total Time (min): 30 min     Billable Minutes: Therapeutic Activity 30min    Treatment Type: Treatment  PT/PTA: PT     Number of PTA visits since last PT visit: 0     04/10/2025

## 2025-04-10 NOTE — PT/OT/SLP PROGRESS
"Occupational Therapy   Treatment & Discharge    Name: Mitch Whittaker  MRN: [id number]  Admitting Diagnosis:  Cellulitis of left lower extremity       Recommendations:     Discharge Recommendations: Low Intensity Therapy (for caregiver training and home assessment)  Discharge Equipment Recommendations:  to be determined by next level of care  Barriers to discharge:  Other (Comment) (increased physical assist)    Assessment:     Mitch Whittaker is a 69 y.o. male with a medical diagnosis of Cellulitis of left lower extremity.  He presents with the following performance deficits affecting function are weakness, impaired endurance, impaired self care skills, impaired functional mobility, gait instability, impaired balance, impaired cardiopulmonary response to activity, pain, decreased safety awareness, edema, impaired skin, decreased lower extremity function, decreased upper extremity function, decreased coordination, decreased ROM, abnormal tone, impaired coordination, impaired fine motor.     Pt reassessed at this time to determine level of appropriateness for continued OT treatment. Pt with poor tolerance to activity, poor participation in OT treatments, and poor rehab prognosis. Pt not progressing toward acute OT goals established at [facility]. Continued BLE pain with light touch and minimal movement, limiting participation. Pt refusing EOB/OOB activity multiple times during hospitalization. Pt would not benefit from continued OT intervention at this time.     Plan:   D/C acute OT services. Please re-consult if situation changes.  Plan of Care Expires: 04/10/25  Plan of Care Reviewed with: patient    Subjective     Chief Complaint: BLE and neck pain.  Pt initially agreeable to bed mobility, but refusing further activity following initiation of sup>sit.   "I can't do it, put my legs back." "I told you, it hurts to move them."  Patient/Family Comments/goals: get comfortable in bed  Pain/Comfort:  Pain Rating 1: " 10/10  Location - Side 1: Bilateral (L>R)  Location - Orientation 1: generalized  Location 1: leg  Pain Addressed 1: Reposition, Distraction, Cessation of Activity  Pain Rating Post-Intervention 1: 10/10  Pain Rating 2: 10/10  Location - Side 2: Bilateral  Location - Orientation 2: posterior  Location 2: neck  Pain Addressed 2: Reposition, Distraction, Cessation of Activity  Pain Rating Post-Intervention 2: 10/10    Objective:     Communicated with: Nurse and epic chart review prior to session.  Patient found supine with peripheral IV, telemetry, PureWick upon OT entry to room.    General Precautions: Standard, fall, special contact    Orthopedic Precautions:N/A  Braces: N/A  Respiratory Status: Room air     Occupational Performance:     Bed Mobility:    Patient completed Rolling/Turning to Right with maximal assistance and 2 persons   Supine scoot to HOB with Total A x2.  Attempted Supine>Sit with Max A x2, but pt unable to complete and refused further activity d/t severe BLE pain.   Required increased time to complete.  Pt with increased BLE pain with light touch and minimal movement.    [facility] 6 Click ADL: 10    Treatment & Education:  Pt demonstrated improved alertness today, oriented x3. Tolerated interventions poor; pt not progressing toward OT goals. Reviewed role of OT in acute setting and benefits of participation. Educated on importance of EOB activity and calling for A to meet needs. Encouraged completion of B UE AROM therex throughout the day to tolerance to increase functional strength and activity tolerance. Educated on turning/repositioning in bed for pressure relief. Educated patient on importance of increased tolerance to upright position and direct impact on CV endurance and strength. Patient encouraged to sit up with bed in chair position daily as tolerated and with meals. Patient stated understanding. Discharge acute OT services.    Patient left HOB elevated with all lines intact, call button in  reach, and bed alarm on    GOALS:   Multidisciplinary Problems       Occupational Therapy Goals          Problem: Occupational Therapy    Goal Priority Disciplines Outcome Interventions   Occupational Therapy Goal     OT, PT/OT Not Progressing    Description: O.T. GOALS TO BE MET 4-16-25  PT WILL TOLERATE 1 SET X 20 REPS B UE ROM EXERCISE  MOD A WITH SUPINE<SIT TRANSFERS  MAX A WITH BSC TRANSFERS  MIN A WITH ROLLING L<>R                         Time Tracking:     OT Date of Treatment: 04/10/25  OT Start Time: 0910  OT Stop Time: 0940  OT Total Time (min): 30 min    Billable Minutes:Therapeutic Activity 30    OT/ANNIA: OT     Number of ANNIA visits since last OT visit: 0  Tena Miranda OT     4/10/2025

## 2025-04-10 NOTE — PROGRESS NOTES
[facility])  Nephrology  Progress Note    Patient Name: Mitch Whittaker  MRN: [id number]  Admission Date: 4/1/2025  Hospital Length of Stay: 9 days  Attending Provider: Zeenat Cordova MD   Primary Care Physician: Valery Caal MD  Principal Problem:Cellulitis of left lower extremity    Consults  Subjective:     The patient is a 69 y.o. male with a hx of ESRD who underwent living related kidney transplant about 10 years ago.  He has CKD stage 3 and is followed by Dr. Gonsalez of [facility].  History of CMV viremia with titer positive as recently as April of 2023 per outpatient notes from Dr. Gonsalez.  He has a multitude of chronic other medical conditions including CHF, type 2 diabetes, obesity etcetera.  He takes tacrolimus, prednisone, and mycophenolate for his immunosuppression.  Of note his mycophenolate was held for a period of time earlier in the year in the setting of his active C diff infection.  During his recent hospital stay this was assumed around March 20th.     Patient was most recently in the hospital about 2 weeks ago.  During the hospital stay he was treated with IV diuresis for acute on chronic heart failure.  He was also on oral vancomycin for recent C diff infection.  He was discharged March 22nd with a creatinine around 1.7.     Patient now presents yet again to the hospital April 1st and is admitted to the hospital by hospital medicine with lower extremity cellulitis.  We are consulted to assist in the care of this patient with renal transplant and CKD.  This morning his potassium was 3.3, BUN 35 and creatinine 1.9.    04/04/2025:  Patient was seen in his hospital room.  In bed resting comfortably.  No acute distress noted.  No new complaints from overnight.    04/05/2025:  Patient was seen in his hospital room.  In bed resting comfortably.  A family member was at the bedside assisting with breakfast.  No acute distress noted.  No new complaints from  overnight.    04/06/2025: Patient was seen in his hospital room.  In bed resting comfortably.  No acute distress noted.    04/07/2025: Patient was seen in his hospital room.  In bed resting comfortably.  No acute distress noted.  Somewhat disoriented this morning.    04/08/2025:  Seen in his hospital room, in bed resting comfortably.  Less interactive today.  A family member is at the bedside.  All nephrology related questions were answered to her satisfaction.    04/09/2025: Patient was seen in his hospital room.  In bed resting comfortably.  Working with nursing staff being bathed.  No acute distress noted.    04/10/2025: Patient was seen in his hospital room.  No acute distress noted.  Does not answer questions or follow simple commands.  He was confused this morning and refused is a and meds.    Review of systems:  He does not answer questions this morning.    Review of patient's allergies indicates:   Allergen Reactions    Lisinopril Other (See Comments)     Other reaction(s):  cough    Actos  [pioglitazone] Other (See Comments)     Other reaction(s): CHF    Metformin Other (See Comments)     Other reaction(s): renal insuff  Other reaction(s): CHF     Current Facility-Administered Medications   Medication Frequency    0.9% NaCl infusion Continuous    acetaminophen suppository 650 mg Q6H PRN    acetaminophen tablet 650 mg Q6H PRN    allopurinol split tablet 50 mg Daily    apixaban tablet 5 mg BID    aspirin EC tablet 81 mg Daily    cephALEXin capsule 500 mg Q8H    dextrose 50% injection 12.5 g PRN    dextrose 50% injection 25 g PRN    ferrous sulfate tablet 1 each Daily    glucagon (human recombinant) injection 1 mg PRN    glucose chewable tablet 16 g PRN    glucose chewable tablet 24 g PRN    HYDROcodone-acetaminophen 5-325 mg per tablet 1 tablet Q6H PRN    insulin aspart U-100 pen 0-5 Units QID (AC + HS) PRN    insulin glargine U-100 (Lantus) pen 14 Units BID    magnesium sulfate 2g in water 50mL IVPB (premix)  Once    melatonin tablet 6 mg Nightly PRN    metoprolol succinate (TOPROL-XL) 24 hr tablet 25 mg Daily    naloxone 0.4 mg/mL injection 0.02 mg PRN    ondansetron injection 4 mg Q8H PRN    pantoprazole EC tablet 40 mg Daily    polyethylene glycol packet 17 g Daily PRN    predniSONE tablet 5 mg Daily    promethazine tablet 25 mg Q6H PRN    QUEtiapine tablet 25 mg QHS    simethicone chewable tablet 80 mg QID PRN    sodium bicarbonate tablet 650 mg BID    sodium chloride 0.9% flush 3 mL Q12H PRN    tacrolimus capsule 3 mg Daily PM    tacrolimus capsule 3 mg Daily AM       Objective:     Vital Signs (Most Recent):  Temp: 97.5 °F (36.4 °C) (04/10/25 0957)  Pulse: (!) 112 (04/10/25 0957)  Resp: 19 (04/10/25 0957)  BP: 123/60 (04/10/25 0957)  SpO2: 97 % (04/10/25 0957) Vital Signs (24h Range):  Temp:  [97.4 °F (36.3 °C)-98 °F (36.7 °C)] 97.5 °F (36.4 °C)  Pulse:  [] 112  Resp:  [18-20] 19  SpO2:  [97 %-100 %] 97 %  BP: (118-144)/(56-70) 123/60     Weight: 103.9 kg (229 lb 0.9 oz) (04/02/25 2354)  Body mass index is 31.07 kg/m².  Body surface area is 2.3 meters squared.    I/O last 3 completed shifts:  In: 800.7 [I.V.:800.7]  Out: 500 [Urine:500]    Physical Exam  Constitutional:       Appearance: Normal appearance.   HENT:      Head: Normocephalic and atraumatic.   Eyes:      General: No scleral icterus.     Extraocular Movements: Extraocular movements intact.      Pupils: Pupils are equal, round, and reactive to light.   Cardiovascular:      Rate and Rhythm: Normal rate and regular rhythm.   Pulmonary:      Effort: Pulmonary effort is normal.      Breath sounds: No stridor.   Musculoskeletal:      Right lower leg: Edema present.      Left lower leg: Edema present.   Skin:     General: Skin is warm and dry.   Neurological:      General: No focal deficit present.      Mental Status: He is alert and oriented to person, place, and time.   Psychiatric:         Mood and Affect: Mood normal.         Behavior: Behavior  normal.         Significant Labs:sureBMP:   Recent Labs   Lab 04/09/25  2157 04/10/25  0432    138   K 3.6 3.3*    105   CO2 23 23   BUN 55* 54*   CREATININE 2.6* 2.6*   CALCIUM 9.0 9.0   MG 1.7  --      CMP:   Recent Labs   Lab 04/10/25  0432   CALCIUM 9.0   ALBUMIN 1.5*      K 3.3*   CO2 23      BUN 54*   CREATININE 2.6*     All labs within the past 24 hours have been reviewed.    Significant Imaging:  Labs: Reviewed      Assessment/Plan:     Active Diagnoses:    Diagnosis Date Noted POA    PRINCIPAL PROBLEM:  Cellulitis of left lower extremity [L03.116] 04/02/2025 Yes    Acute encephalopathy [G93.40] 04/07/2025 No    Sepsis [A41.9] 04/02/2025 Yes    Hypomagnesemia [E83.42] 04/02/2025 Yes    Hypertension [I10] 04/02/2025 Yes     Chronic    Obesity (BMI 30-39.9) [E66.9] 04/02/2025 Yes     Chronic    History of DVT (deep vein thrombosis) [Z86.718] 04/02/2025 Not Applicable     Chronic    Weakness of both lower extremities [R29.898] 04/02/2025 Yes     Chronic    Anemia, chronic disease [D63.8] 03/01/2025 Yes     Chronic    Hyperlipidemia associated with type 2 diabetes mellitus [E11.69, E78.5] 12/13/2024 Yes     Chronic    History of renal transplant [Z94.0] 12/08/2022 Not Applicable    Chronic venous insufficiency of lower extremity [I87.2]  Yes    Chronic combined systolic and diastolic congestive heart failure [I50.42] 03/15/2019 Yes     Chronic    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, with long-term current use of insulin [E11.3553, Z79.4] 03/27/2017 Not Applicable     Chronic    Chronic immunosuppression with Prograf, MMF and prednisone [Z29.89] 06/18/2015 Not Applicable     Chronic    Coronary artery disease of native artery of native heart with stable angina pectoris [I25.118]  Yes     Chronic    Obstructive sleep apnea [G47.33] 06/13/2013 Yes     Chronic      Problems Resolved During this Admission:    Diagnosis Date Noted Date Resolved POA     Thrombocytopenia [D69.6] 04/03/2025 04/06/2025 No    Hypokalemia [E87.6] 04/02/2025 04/06/2025 Yes       Assessment and plan:    1. Kidney transplant status: In around living related donor transplant in 2015.      Continuing gentle hydration.  Creatinine has decreased slightly from 2.7 down to 2.6.    Baseline creatinine is usually between about 1.3 and 1.5.  Will continue to monitor.    2. Immunosuppression:  Prograf level was 9 after decreasing his dose from 4 and 4 down to 4 and 3.  Will decrease further to 3 and 3 and continue to monitor.    3. Cellulitis:  Appears to be improving.  Defer to primary service for management.    4. Hypokalemia:  Potassium is low at 3.3.  Would continue to replace per protocol.      5. Hypercalcemia:  Serum calcium was 9.0 with an albumin of 1.5 corrected to 11.  Rocaltrol discontinued today.    A minimum of 50 minutes was spent in patient examination, chart review and coordination of care with other providers.    Thank you for your consult.     Noel Jimenes MD  Nephrology  [facility] - Telemetry ([facility])

## 2025-04-10 NOTE — ASSESSMENT & PLAN NOTE
Patient has Abnormal Magnesium: hypomagnesemia. Will continue to monitor electrolytes closely. Will replace the affected electrolytes and repeat labs to be done after interventions completed. The patient's magnesium results have been reviewed and are listed below.  Recent Labs   Lab 04/09/25  2157   MG 1.7

## 2025-04-10 NOTE — SUBJECTIVE & OBJECTIVE
Interval History:  See hospital course     Review of Systems  Objective:     Vital Signs (Most Recent):  Temp: 97.8 °F (36.6 °C) (04/10/25 1230)  Pulse: 101 (04/10/25 1353)  Resp: 18 (04/10/25 1230)  BP: 121/61 (04/10/25 1230)  SpO2: 97 % (04/10/25 1230) Vital Signs (24h Range):  Temp:  [97.4 °F (36.3 °C)-98 °F (36.7 °C)] 97.8 °F (36.6 °C)  Pulse:  [] 101  Resp:  [18-20] 18  SpO2:  [97 %-100 %] 97 %  BP: (118-131)/(57-61) 121/61     Weight: 103.9 kg (229 lb 0.9 oz)  Body mass index is 31.07 kg/m².    Intake/Output Summary (Last 24 hours) at 4/10/2025 1445  Last data filed at 4/10/2025 1230  Gross per 24 hour   Intake --   Output 750 ml   Net -750 ml         Physical Exam  Vitals and nursing note reviewed. Exam conducted with a chaperone present.   Constitutional:       General: He is not in acute distress.     Appearance: He is ill-appearing.   Cardiovascular:      Rate and Rhythm: Normal rate and regular rhythm.      Heart sounds: No murmur heard.  Pulmonary:      Effort: Pulmonary effort is normal.      Breath sounds: Normal breath sounds. No wheezing or rhonchi.   Abdominal:      General: Bowel sounds are normal. There is no distension.      Palpations: Abdomen is soft.      Tenderness: There is no abdominal tenderness.   Musculoskeletal:      Comments: Trace BLE edema    Neurological:      Mental Status: He is alert.      Comments: Oriented to self, place, month year                Significant Labs: All pertinent labs within the past 24 hours have been reviewed.    Significant Imaging: I have reviewed all pertinent imaging results/findings within the past 24 hours.

## 2025-04-10 NOTE — PLAN OF CARE
Pt Max A x2 for rolling. Attempted sup>sit with Max A x2, but pt refused further progression d/t increased BLE pain. Pt with poor tolerance to activity and poor participation in OT treatments. Pt not progressing toward acute OT goals established at [facility].  D/C acute OT at this time. Please re-consult if situation changes.   Recommending low intensity therapy for caregiver training and home assessment.

## 2025-04-10 NOTE — ASSESSMENT & PLAN NOTE
Department of Anesthesiology  Postprocedure Note    Patient: Lisbeth Reyes  MRN: [id number]  YOB: 1954  Date of evaluation: 11/11/2024    Procedure Summary       Date: 11/11/24 Room / Location: [facility] MAIN OR  / [facility] MAIN OR    Anesthesia Start: 1304 Anesthesia Stop: 1419    Procedure: CHOLECYSTECTOMY LAPAROSCOPIC (Abdomen) Diagnosis:       Acute cholecystitis      (Acute cholecystitis [K81.0])    Providers: Ant Mathur MD Responsible Provider: Jose Rodriguez MD    Anesthesia Type: General ASA Status: 3 - Emergent            Anesthesia Type: General    Tyrone Phase I: Tyrone Score: 10    Tyrone Phase II:      Anesthesia Post Evaluation    Patient location during evaluation: PACU  Patient participation: complete - patient participated  Level of consciousness: awake and alert  Airway patency: patent  Nausea & Vomiting: no nausea and no vomiting  Cardiovascular status: hemodynamically stable  Respiratory status: acceptable, nonlabored ventilation and spontaneous ventilation  Hydration status: euvolemic  Comments: BP (!) 145/65   Pulse (!) 45   Temp 98.7 °F (37.1 °C) (Infrared)   Resp 18   Ht 1.6 m (5' 3\")   Wt 99.8 kg (220 lb)   SpO2 100%   BMI 38.97 kg/m²     Currently Aox3 O2sats 100% on 4LNC  CXR ordered for initial low sats when arrived in PACU shows right hemidiaphram atelectasis   Working with [employer] and will continue to monitor on the floor.    Multimodal analgesia pain management approach  Pain management: adequate and satisfactory to patient    No notable events documented.   Patient currently on Prograf, CellCept, and mycophenolate.  -holding mycophenolate  Nephrology consulted for management of immunosuppressant medications ; defer management  Monitor Prograf level per renal  Discussed with Dr. Jimenes- Cr improving,will continue gentle IV fluids for now

## 2025-04-11 ENCOUNTER — TELEPHONE (OUTPATIENT)
Dept: ALLERGY | Facility: CLINIC | Age: 70
End: 2025-04-11
Payer: COMMERCIAL

## 2025-04-11 VITALS
SYSTOLIC BLOOD PRESSURE: 124 MMHG | HEART RATE: 94 BPM | DIASTOLIC BLOOD PRESSURE: 61 MMHG | RESPIRATION RATE: 19 BRPM | WEIGHT: 222.69 LBS | HEIGHT: 72 IN | TEMPERATURE: 97 F | OXYGEN SATURATION: 97 % | BODY MASS INDEX: 30.16 KG/M2

## 2025-04-11 DIAGNOSIS — Z94.0 KIDNEY REPLACED BY TRANSPLANT: ICD-10-CM

## 2025-04-11 DIAGNOSIS — E87.5 HYPERKALEMIA: Primary | ICD-10-CM

## 2025-04-11 DIAGNOSIS — N25.81 SECONDARY HYPERPARATHYROIDISM OF RENAL ORIGIN: ICD-10-CM

## 2025-04-11 PROBLEM — G93.41 METABOLIC ENCEPHALOPATHY: Status: ACTIVE | Noted: 2025-04-07

## 2025-04-11 PROBLEM — R41.0 DELIRIUM: Status: ACTIVE | Noted: 2025-04-11

## 2025-04-11 LAB
ABSOLUTE NEUTROPHIL MANUAL (OHS): 1.1 K/UL
ALBUMIN SERPL BCP-MCNC: 1.3 G/DL (ref 3.5–5.2)
ANION GAP (OHS): 8 MMOL/L (ref 8–16)
ANISOCYTOSIS BLD QL SMEAR: SLIGHT
BUN SERPL-MCNC: 40 MG/DL (ref 8–23)
CALCIUM SERPL-MCNC: 7.5 MG/DL (ref 8.7–10.5)
CHLORIDE SERPL-SCNC: 113 MMOL/L (ref 95–110)
CO2 SERPL-SCNC: 18 MMOL/L (ref 23–29)
CREAT SERPL-MCNC: 1.9 MG/DL (ref 0.5–1.4)
EOSINOPHIL NFR BLD MANUAL: 2 % (ref 0–8)
ERYTHROCYTE [DISTWIDTH] IN BLOOD BY AUTOMATED COUNT: 17.7 % (ref 11.5–14.5)
GFR SERPLBLD CREATININE-BSD FMLA CKD-EPI: 37 ML/MIN/1.73/M2
GLUCOSE SERPL-MCNC: 85 MG/DL (ref 70–110)
HCT VFR BLD AUTO: 26.8 % (ref 40–54)
HGB BLD-MCNC: 7.5 GM/DL (ref 14–18)
HYPOCHROMIA BLD QL SMEAR: ABNORMAL
LYMPHOCYTES NFR BLD MANUAL: 41 % (ref 18–48)
MCH RBC QN AUTO: 24 PG (ref 27–31)
MCHC RBC AUTO-ENTMCNC: 28 G/DL (ref 32–36)
MCV RBC AUTO: 86 FL (ref 82–98)
MONOCYTES NFR BLD MANUAL: 16 % (ref 4–15)
MYELOCYTES NFR BLD MANUAL: 2 %
NEUTROPHILS NFR BLD MANUAL: 38 % (ref 38–73)
NEUTS BAND NFR BLD MANUAL: 1 %
NUCLEATED RBC (/100WBC) (OHS): 1 /100 WBC
OVALOCYTES BLD QL SMEAR: ABNORMAL
PHOSPHATE SERPL-MCNC: 2.6 MG/DL (ref 2.7–4.5)
PLATELET # BLD AUTO: 238 K/UL (ref 150–450)
PLATELET BLD QL SMEAR: ABNORMAL
PMV BLD AUTO: 11.2 FL (ref 9.2–12.9)
POCT GLUCOSE: 133 MG/DL (ref 70–110)
POCT GLUCOSE: 93 MG/DL (ref 70–110)
POIKILOCYTOSIS BLD QL SMEAR: SLIGHT
POTASSIUM SERPL-SCNC: 3.1 MMOL/L (ref 3.5–5.1)
RBC # BLD AUTO: 3.12 M/UL (ref 4.6–6.2)
SCHISTOCYTES BLD QL SMEAR: PRESENT
SODIUM SERPL-SCNC: 139 MMOL/L (ref 136–145)
TACROLIMUS BLD-MCNC: 5.6 NG/ML (ref 5–15)
WBC # BLD AUTO: 2.79 K/UL (ref 3.9–12.7)

## 2025-04-11 PROCEDURE — 25000003 PHARM REV CODE 250: Performed by: NURSE PRACTITIONER

## 2025-04-11 PROCEDURE — 36415 COLL VENOUS BLD VENIPUNCTURE: CPT | Performed by: INTERNAL MEDICINE

## 2025-04-11 PROCEDURE — 80069 RENAL FUNCTION PANEL: CPT | Performed by: INTERNAL MEDICINE

## 2025-04-11 PROCEDURE — 25000003 PHARM REV CODE 250: Performed by: INTERNAL MEDICINE

## 2025-04-11 PROCEDURE — 63600175 PHARM REV CODE 636 W HCPCS: Performed by: HOSPITALIST

## 2025-04-11 PROCEDURE — 25000003 PHARM REV CODE 250: Performed by: HOSPITALIST

## 2025-04-11 PROCEDURE — 80197 ASSAY OF TACROLIMUS: CPT | Performed by: INTERNAL MEDICINE

## 2025-04-11 PROCEDURE — 63600175 PHARM REV CODE 636 W HCPCS: Performed by: INTERNAL MEDICINE

## 2025-04-11 PROCEDURE — 85007 BL SMEAR W/DIFF WBC COUNT: CPT | Performed by: INTERNAL MEDICINE

## 2025-04-11 PROCEDURE — 99232 SBSQ HOSP IP/OBS MODERATE 35: CPT | Mod: ,,, | Performed by: INTERNAL MEDICINE

## 2025-04-11 RX ORDER — TACROLIMUS 1 MG/1
CAPSULE ORAL
Start: 2025-04-11

## 2025-04-11 RX ORDER — SODIUM,POTASSIUM PHOSPHATES 280-250MG
2 POWDER IN PACKET (EA) ORAL ONCE
Status: COMPLETED | OUTPATIENT
Start: 2025-04-11 | End: 2025-04-11

## 2025-04-11 RX ORDER — PREDNISONE 5 MG/1
5 TABLET ORAL DAILY
Qty: 30 TABLET | Refills: 11 | Status: CANCELLED | OUTPATIENT
Start: 2025-04-11 | End: 2026-04-11

## 2025-04-11 RX ADMIN — METOPROLOL SUCCINATE 25 MG: 25 TABLET, EXTENDED RELEASE ORAL at 08:04

## 2025-04-11 RX ADMIN — Medication 2 PACKET: at 08:04

## 2025-04-11 RX ADMIN — PREDNISONE 5 MG: 5 TABLET ORAL at 08:04

## 2025-04-11 RX ADMIN — TACROLIMUS 3 MG: 1 CAPSULE ORAL at 08:04

## 2025-04-11 RX ADMIN — PANTOPRAZOLE SODIUM 40 MG: 40 TABLET, DELAYED RELEASE ORAL at 08:04

## 2025-04-11 RX ADMIN — APIXABAN 5 MG: 2.5 TABLET, FILM COATED ORAL at 08:04

## 2025-04-11 RX ADMIN — ASPIRIN 81 MG: 81 TABLET, COATED ORAL at 08:04

## 2025-04-11 RX ADMIN — FERROUS SULFATE TAB 325 MG (65 MG ELEMENTAL FE) 1 EACH: 325 (65 FE) TAB at 08:04

## 2025-04-11 RX ADMIN — INSULIN GLARGINE 16 UNITS: 100 INJECTION, SOLUTION SUBCUTANEOUS at 08:04

## 2025-04-11 RX ADMIN — SODIUM BICARBONATE 650 MG: 650 TABLET ORAL at 08:04

## 2025-04-11 RX ADMIN — ALLOPURINOL 50 MG: 300 TABLET ORAL at 08:04

## 2025-04-11 NOTE — NURSING
Discharge instructions received and reviewed with pt and family at bedside.  Pt voiced understanding and all questions answered to satisfaction.  Stressed importance to making and keeping all follow up appointments.  Medications sent to pt pharmacy and reviewed with pt.  Tele monitor removed and brought to monitor tech.  IV d/c'd with tip intact, pressure dressing applied.  Pt transported to front of hospital via stretcher by EMS to be discharged home.

## 2025-04-11 NOTE — PLAN OF CARE
[facility] - Telemetry (Hospital)  Discharge Final Note    Primary Care Provider: Valery Caal MD    Expected Discharge Date: 4/11/2025    Final Discharge Note (most recent)       Final Note - 04/11/25 1403          Final Note    Assessment Type Final Discharge Note     Anticipated Discharge Disposition Home-Health Care [facility]     Hospital Resources/Appts/Education Provided Post-Acute resouces added to AVS        Post-Acute Status    Discharge Delays None known at this time                   Pt discharged home today. NP at Home ordered for hospital follow up. [facility] to resume care; AVS updated.   SW sent in-basket message to Dr Gonsalez staff to coordinate scheduling f/u with pt/family.     Important Message from Medicare  Important Message from Medicare regarding Discharge Appeal Rights: Given to patient/caregiver, Explained to patient/caregiver, Signed/date by patient/caregiver     Date IMM was signed: 04/11/25  Time IMM was signed: 1330     Follow-up providers       Valery Caal MD   Specialty: Family Medicine   Relationship: PCP - General    [address] [place] [address] [address] [place] [place] [address]   Phone: [phone]       Next Steps: Schedule an appointment as soon as possible for a visit in 3 day(s)    Hany Gonsalez MD   Specialty: Nephrology    [facility]  [address]  [facility] [id number]   Phone: [phone]       Next Steps: Schedule an appointment as soon as possible for a visit in 1 week(s)    Virginia Walters PA   Specialty: Transplant    [address] [facility] [place] [address]   Phone: [phone]       Next Steps: Schedule an appointment as soon as possible for a visit              After-discharge care                Home Medical Care       *[facility]   Service: Home Health Services    [address]  [facility] [address]   Phone: [phone]

## 2025-04-11 NOTE — DISCHARGE SUMMARY
[facility] Hospital Medicine  Discharge Summary      Patient Name: Mitch Whittaker  MRN: [id number]  [facility]: [id number]  Patient Class: IP- Inpatient  Admission Date: 4/1/2025  Hospital Length of Stay: 10 days  Discharge Date and Time: 04/11/2025 2:23 PM  Attending Physician: Zeenat Cordova MD   Discharging Provider: Zeenat Cordova MD  Primary Care Provider: Valery Caal MD    Primary Care Team: Networked reference to record PCT     HPI:   Mitch Whittaker is a 69 y.o. 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), Diabetic retinopathy, DM (diabetes mellitus), type 2 with complications (1994), Edema, End stage kidney disease, Gallbladder polyp, Heart failure, diastolic, due to HTN, Hemodialysis status, 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 who presented to the ED for further evaluation of worsening left leg pain and swelling which began last night.  Patient reports being bed-bound but suffers from chronic venous stasis and recurrent skin infections.  Patient reported symptoms began acutely with no known alleviating or aggravating factors noted  with a associated symptoms including nonproductive cough in addition to those noted above.  He denied endorsing any recent trauma to the affected area and reported being in his usual state of health prior to onset of symptoms.  All other review of systems negative except as noted above.  Initial workup in the ED revealed patient met SIRS criteria for sepsis with concerns for underlying cellulitis and was initiated on cefepime.  Remaining laboratory workup revealed H/H 9.6/31.8, potassium 2.8, creatinine/GFR 1.9/37, blood glucose 61, magnesium 1.1, troponin 0.152, lactic acid within normal limits, procalcitonin 1.60, chest x-ray negative for acute findings.  Patient admitted to Hospital Medicine inpatient for continued medical management.    PCP: Valery Caal      * No surgery found *      Hospital Course:     4/2/25  Patient admitted or LLE cellulitis, continue cefepime  Noted bilateral chronic venous stasis, Wound Care consulted  Reports BLE weakness x 2 months, difficulty ambulating  Replete K and Mg  PT/OT consult    4/3/25  Patient with dysphagia and early satiety, ST consulted  Hx of Tacrolimus induced GI toxicity  Upper GI FL pending  Patient with renal transplant, on tacrolimus and cellcept  Restart tacrolimus, consult Nephrology for assitance in transplant management  Continue cefepime for LLE celulitis  PT/OT with reccs for JOSE, consult  for SNF placement    4/4/25  NAEON, patient resting in bed, no complaints  Blood cultures NGTD, check procal, possibly de-escalate cefepime 1-2 days  On IDDSI level 5 diet per  rec, appreciate assistance, will plan for outpatient GI referral  Stop famotidine, start Protonix, plan to continue on discharge    4/5/25  NAEON, AM labs pending  LLE cellulitis improving  Cr 1.8, Nephrology following, on Tacrolimus  Family at bedside, updated    4/6/25  NAEON, Cr 1.8 --> 2.3, tacrolimus level pending  Continue Keflex for cellulitis   Patient reports fatigue,  generalized weakness  Consult PT/OT, may benefit from SNF/rehab    04/07/2025   -patient is seen and examined with wife and RN at bedside.  Per wife, patient has been more confused over the weekend, reports pain all over and refusing some care, stating he feels like he is going to die.  On evaluation, he is oriented to self and place but not situation.  Refusing most of exam.  Question delirium secondary to hospitalization versus due to recent use of cefepime.    04/08/2025  No acute events overnight.  Patient with sinus tachycardia on tele monitor, T-max 100.4°.  Patient is seen and examined with nurse tech at bedside, no family present during morning rounds.  Patient awake but does not answer questions, when extremities are palpated he states please don't do that ma'a.m.  Moving all extremities spontaneously but does not follow commands.  We will obtain MRI brain further evaluate altered mental status.    04/09  MRI Brain with no acute findings. Per case management, family declined SNF placement for patient.   Pt mental status waxing/waning. He is slightly more conversant this morning, oriented x 1. Reports he did not sleep well overnight, reports there were demons in his room but that he feels better this AM.     04/10/2025  Pt seen and examined with therapists at bedside, he is much more alert and coherent today. Oriented to self, place, year, answers questions appropriately. Reports some L leg pain. Denies CP, SOB. Discussed with nephrology who recommend continuation of IVF as pt po intake is poor and Cr downtrending with hydration. Completed 10d course of abx, Keflex discontinued.     04/11/2025  No acute events overnight.  Patient is seen and examined with spouse at bedside.  Patient is much more alert this morning, oriented x3.  Main complaint is pain to bilateral legs.  Denies chest pain, shortness of breath.  Creatinine improved to 1.9, IV fluids discontinued.  Potassium/phosphorus repleted.  Tacrolimus  level returned at 5.6--discussed with nephrology team Dr. Magallanes-we will continue Prograf at current dose of 3 mg b.i.d. until patient can follow up with Nephrology and have repeat labs done, resume mycophenolate on discharge.  Patient is seen and examined on day of discharge and appears stable for discharge with home health per my exam.  Follow up with PCP within 3-5 days (srinivasansleón NP at home referral), followup with nephrology within 1-2 weeks      Goals of Care Treatment Preferences:  Code Status: Full Code         Consults:   Consults (From admission, onward)          Status Ordering Provider     Inpatient consult to Social Work  Once        Provider:  (Not yet assigned)    Completed FÉLIX DEGROOT     Inpatient consult to Nephrology  Once        Provider:  Noel Coronado MD    Completed PAULINE STONE     Inpatient consult to Social Work  Once        Provider:  (Not yet assigned)    Completed PAULINE STONE            Assessment & Plan  Cellulitis of left lower extremity  Sepsis  Chronic venous insufficiency of lower extremity  Started on cefepime on admission 4/1/25  De-escalated to Keflex 4/5/25  Cellulitis appears to be improving   Completed total 10d course of abx while inpatient   Continue wound care per Wound Care recommendations     Metabolic encephalopathy  Delirium  Wife reports 2 day history of worsening confusion from patient's baseline, suspect hospital delirium versus medication induced as patient was recently on cefepime.    Cefepime discontinued on 0 4/0 4   Minimize sedating meds   Mental status improved with treatment with Seroquel   Repeat UA negative for UTI, B12 elevated.  TSH, ammonia within normal limits.  Folic acid wnl.  MRI brain with no acute findings     Weakness of both lower extremities  Patient reported worsening bilateral lower extremity weakness times 2-3 months in which he reported symptoms began acutely after his right leg gave out going to the restroom.  Patient denies  "endorsing any back pain, experiencing ground level fall/trauma, but does report fecal incontinence when urinating.  Patient has since been bed-bound in his not been seen/evaluated by a physician for these symptoms.  PT/OT evaluated and recommended moderate intensity therapy on discharge but family not interested in pursuing SNF placement at this time     Chronic combined systolic and diastolic congestive heart failure  Hypertension  Patient has Combined Systolic and Diastolic heart failure that is Chronic. On presentation their CHF was well compensated. Most recent BNP and echo results are listed below.  No results for input(s): "BNP" in the last 72 hours.  Latest ECHO  Results for orders placed during the hospital encounter of 03/13/25    Echo Saline Bubble? No    Interpretation Summary    Left Ventricle: The left ventricle is normal in size. Mildly increased ventricular mass. Mildly increased wall thickness. There is mild concentric hypertrophy. Normal wall motion. Septal motion is consistent with bundle branch block. There is moderately reduced systolic function with a visually estimated ejection fraction of 35 - 40%. Grade I diastolic dysfunction.    Right Ventricle: The right ventricle is normal in size. Wall thickness is normal. Systolic function is normal.    Left Atrium: Mildly dilated    Aorta: Aortic annulus is mildly dilated measuring 4.01 cm. Ascending aorta is normal measuring 3.69 cm.    Pulmonary Artery: The estimated pulmonary artery systolic pressure is 24 mmHg.    IVC/SVC: Normal venous pressure at 3 mmHg.    Current Heart Failure Medications  metoprolol succinate (TOPROL-XL) 24 hr tablet 25 mg, Daily, Oral    Plan  -Monitor strict I&Os and daily weights.    -Place on telemetry  -Low sodium diet  -Place on fluid restriction of 1.5 L.   -Cardiology has not been consulted  -The patient's volume status is at their baseline  -Continue Beta-blocke. 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  Last A1c reviewed-   Lab Results   Component Value Date    HGBA1C 5.7 (H) 12/17/2024     Most recent fingerstick glucose reviewed-   Recent Labs   Lab 04/10/25  2104 04/10/25  2339 04/11/25  0617 04/11/25  1140   POCTGLUCOSE 84 129* 93 133*        Anemia, chronic disease  Anemia is likely due to chronic disease due to Chronic Kidney Disease. Most recent hemoglobin and hematocrit are listed below.  Recent Labs     04/09/25  0428 04/10/25  0432 04/11/25  0457   HGB 8.8* 8.0* 7.5*   HCT 30.6* 28.2* 26.8*     Plan  -Monitor serial CBC: Daily  -Transfuse PRBC if patient becomes hemodynamically unstable, symptomatic or H/H drops below 7/21.  -Patient has not received any PRBC transfusions to date  -Patient's anemia is currently stable    Coronary artery disease of native artery of native heart with stable angina pectoris  Patient with known CAD, which is controlled.  EKG negative for signs of acute ischemia.  Troponin elevated at 0.15 to likely secondary to demand ischemia in setting of sepsis and CHF. Will continue  home medications  and monitor for S/Sx of angina/ACS. Continue to monitor on telemetry.    Chronic immunosuppression with Prograf, MMF and prednisone  History of renal transplant  Patient currently on Prograf, CellCept as outpatient   - resume Cellcept on discharge   Nephrology consulted for management of immunosuppressant medications    04/11 Prograf level 5.6- discussed with nephrology who recommend to continue current dose of Prograf 3 BID until outpatient followup  - f/u Dr. Gonsalez within 1-2 weeks       History of DVT (deep vein thrombosis)  Currently on Eliquis outpatient.    Obstructive sleep apnea  Currently on CPAP outpatient.  Plan:  -continue CPAP q.h.s.      Final Active Diagnoses:    Diagnosis Date Noted POA     PRINCIPAL PROBLEM:  Cellulitis of left lower extremity [L03.116] 04/02/2025 Yes    Metabolic encephalopathy [G93.41] 04/07/2025 No    Sepsis [A41.9] 04/02/2025 Yes    Weakness of both lower extremities [R29.898] 04/02/2025 Yes     Chronic    Delirium [R41.0] 04/11/2025 No    Hypomagnesemia [E83.42] 04/02/2025 Yes    Hypertension [I10] 04/02/2025 Yes     Chronic    Obesity (BMI 30-39.9) [E66.9] 04/02/2025 Yes     Chronic    History of DVT (deep vein thrombosis) [Z86.718] 04/02/2025 Not Applicable     Chronic    Anemia, chronic disease [D63.8] 03/01/2025 Yes     Chronic    Hyperlipidemia associated with type 2 diabetes mellitus [E11.69, E78.5] 12/13/2024 Yes     Chronic    History of renal transplant [Z94.0] 12/08/2022 Not Applicable    Chronic venous insufficiency of lower extremity [I87.2]  Yes    Chronic combined systolic and diastolic congestive heart failure [I50.42] 03/15/2019 Yes     Chronic    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, with long-term current use of insulin [E11.3553, Z79.4] 03/27/2017 Not Applicable     Chronic    Chronic immunosuppression with Prograf, MMF and prednisone [Z29.89] 06/18/2015 Not Applicable     Chronic    Coronary artery disease of native artery of native heart with stable angina pectoris [I25.118]  Yes     Chronic    Obstructive sleep apnea [G47.33] 06/13/2013 Yes     Chronic      Problems Resolved During this Admission:    Diagnosis Date Noted Date Resolved POA    Thrombocytopenia [D69.6] 04/03/2025 04/06/2025 No    Hypokalemia [E87.6] 04/02/2025 04/06/2025 Yes       Discharged Condition: stable    [place] [facility]    Follow Up:   Contact information for follow-up providers       Valery Caal MD. Schedule an appointment as soon as possible for a visit in 3 day(s).    Specialty: Family Medicine  Contact information:  [address] [place] [address] [address] Marlena HERNANDEZ [address] [phone]               Hany Gonsalez MD. Schedule an  appointment as soon as possible for a visit in 1 week(s).    Specialty: Nephrology  Contact information:  [address]  [facility] [id number]  [phone]               Virginia Walters PA. Schedule an appointment as soon as possible for a visit.    Specialty: Transplant  Contact information:  [address]  [place] [address]  [phone]                       Contact information for after-discharge care       Home Medical Care       [facility] .    Service: Home Health Services  Contact information:  [address]  [facility] [address]  [phone]                                 Patient Instructions:      Ambulatory referral/consult to [facility]   Standing Status: Future   Referral Priority: Routine Referral Type: Consultation   Referral Reason: Specialty Services Required   Number of Visits Requested: 1     Diet renal     SUBSEQUENT HOME HEALTH ORDERS     Order Specific Question Answer Comments   What Home Health Agency is the patient currently using? [facility]      Activity as tolerated       Significant Diagnostic Studies:  see Hospital; Course     Pending Diagnostic Studies:       None           Medications:  Reconciled Home Medications:      Medication List        CHANGE how you take these medications      tacrolimus 1 MG Cap  Commonly known as: PROGRAF  Take 3 capsules (3 mg total) by mouth every morning AND 3 capsules (3 mg total) every evening. Until followup with nephrology.  What changed: See the new instructions.            CONTINUE taking these medications      allopurinoL 100 MG tablet  Commonly known as: ZYLOPRIM  Take ½ tablet (50 mg total) by mouth once daily.     aspirin 81 MG EC tablet  Commonly known as: ECOTRIN  Take 1 tablet by mouth Daily.     calcitRIOL 0.25 MCG Cap  Commonly known as: ROCALTROL  Take 1 capsule (0.25 mcg total) by mouth once daily.     ELIQUIS 5 mg Tab  Generic drug:  "apixaban  Take 1 tablet (5 mg total) by mouth 2 (two) times daily.     famotidine 20 MG tablet  Commonly known as: PEPCID  TAKE ONE TABLET BY MOUTH EVERY EVENING     FeroSuL 325 mg (65 mg iron) Tab tablet  Generic drug: ferrous sulfate  Take 1 tablet (325 mg total) by mouth daily with breakfast.     fish oil-omega-3 fatty acids 300-1,000 mg capsule  Take 1 g by mouth once daily.     furosemide 40 MG tablet  Commonly known as: LASIX  Take 1 tablet (40 mg total) by mouth once daily.  Notes to patient: HOLD UNTIL YOU CAN FOLLOWUP WITH PCP/HH and have labs rechecked      * insulin aspart U-100 100 unit/mL (3 mL) Inpn pen  Commonly known as: NovoLOG  Inject 0-10 Units into the skin before meals and at bedtime as needed (Hyperglycemia). **MODERATE CORRECTION DOSE**  Blood Glucose  mg/dL                  Pre-meal                2200  151-200                2 units                    1 unit  201-250                4 units                    2 units  251-300                6 units                    3 units  301-350                8 units                    4 units  >350                     10 units                  5 units  Administer subcutaneously if needed at times designated by monitoring  schedule.  DO NOT HOLD correction dose insulin for patients who are  NPO.    "HIGH ALERT MEDICATION" - Administer with meals or TF/TPN.     * NovoLOG Flexpen U-100 Insulin 100 unit/mL (3 mL) Inpn pen  Generic drug: insulin aspart U-100  Inject 25 Units into the skin 3 (three) times daily with meals.     ketoconazole 200 mg Tab  Commonly known as: NIZORAL  Take ½ tablet (100 mg total) by mouth once daily.     LANTUS SOLOSTAR U-100 INSULIN 100 unit/mL (3 mL) Inpn pen  Generic drug: insulin glargine U-100 (Lantus)  Inject 15 Units into the skin 2 (two) times daily.     metoprolol succinate 25 MG 24 hr tablet  Commonly known as: TOPROL-XL  Take 1 tablet (25 mg total) by mouth once daily.     multivitamin tablet  Commonly known as: " THERAGRAN  Take 1 tablet by mouth once daily.     mycophenolate 250 mg Cap  Commonly known as: CELLCEPT  Take 2 capsules (500 mg total) by mouth 2 (two) times daily.     OZEMPIC 2 mg/dose (8 mg/3 mL) Pnij  Generic drug: semaglutide  Inject 2 mg into the skin every 7 days.     predniSONE 5 MG tablet  Commonly known as: DELTASONE  Take 1 tablet (5 mg total) by mouth once daily.     rosuvastatin 40 MG Tab  Commonly known as: CRESTOR  Take 1 tablet (40 mg total) by mouth once daily.     sodium bicarbonate 650 MG tablet  Take 1 tablet (650 mg total) by mouth 2 (two) times daily.     triamcinolone acetonide 0.1% 0.1 % ointment  Commonly known as: KENALOG  Apply topically 2 (two) times daily. Use on bilateral lower legs.           * This list has 2 medication(s) that are the same as other medications prescribed for you. Read the directions carefully, and ask your doctor or other care provider to review them with you.                STOP taking these medications      amitriptyline 25 MG tablet  Commonly known as: ELAVIL     cholestyramine 4 gram packet  Commonly known as: QUESTRAN     ENTRESTO  mg per tablet  Generic drug: sacubitriL-valsartan     ondansetron 4 MG Tbdl  Commonly known as: ZOFRAN-ODT            ASK your doctor about these medications      * albuterol 2.5 mg /3 mL (0.083 %) nebulizer solution  Commonly known as: PROVENTIL  Take 3 mLs (2.5 mg total) by nebulization every 4 to 6 hours as needed for Wheezing or Shortness of Breath.     * albuterol 90 mcg/actuation inhaler  Commonly known as: PROVENTIL/VENTOLIN HFA  Inhale 2 puffs into the lungs every 4 (four) hours as needed for Wheezing or Shortness of Breath.     HYDROcodone-acetaminophen 5-325 mg per tablet  Commonly known as: NORCO  Take 1 tablet by mouth every 6 (six) hours as needed for Pain.           * This list has 2 medication(s) that are the same as other medications prescribed for you. Read the directions carefully, and ask your doctor or other  care provider to review them with you.                  Indwelling Lines/Drains at time of discharge:   Lines/Drains/Airways       Drain  Duration                  Hemodialysis AV Fistula Right upper arm -- days     1 day                    Time spent on the discharge of patient: 40 minutes         Zeenat Cordova MD  Department of Hospital Medicine  On license of [facility] - Telemetry ([facility])

## 2025-04-11 NOTE — ASSESSMENT & PLAN NOTE
Patient reported worsening bilateral lower extremity weakness times 2-3 months in which he reported symptoms began acutely after his right leg gave out going to the restroom.  Patient denies endorsing any back pain, experiencing ground level fall/trauma, but does report fecal incontinence when urinating.  Patient has since been bed-bound in his not been seen/evaluated by a physician for these symptoms.  PT/OT evaluated and recommended moderate intensity therapy on discharge but family not interested in pursuing SNF placement at this time

## 2025-04-11 NOTE — ASSESSMENT & PLAN NOTE
Anemia is likely due to chronic disease due to Chronic Kidney Disease. Most recent hemoglobin and hematocrit are listed below.  Recent Labs     04/09/25  0428 04/10/25  0432 04/11/25  0457   HGB 8.8* 8.0* 7.5*   HCT 30.6* 28.2* 26.8*     Plan  -Monitor serial CBC: Daily  -Transfuse PRBC if patient becomes hemodynamically unstable, symptomatic or H/H drops below 7/21.  -Patient has not received any PRBC transfusions to date  -Patient's anemia is currently stable

## 2025-04-11 NOTE — PROGRESS NOTES
Nephrology Progress Note     History of Present Illness   The patient is a 69 y.o. male with a hx of ESRD who underwent living related kidney transplant about 10 years ago.  He has CKD stage 3 and is followed by Dr. Gonsalez of [facility].  History of CMV viremia with titer positive as recently as April of 2023 per outpatient notes from Dr. Gonsalez.  He has a multitude of chronic other medical conditions including CHF, type 2 diabetes, obesity etcetera.  He takes tacrolimus, prednisone, and mycophenolate for his immunosuppression.  Of note his mycophenolate was held for a period of time earlier in the year in the setting of his active C diff infection.  During his recent hospital stay this was assumed around March 20th.     Patient was most recently in the hospital about 2 weeks ago.  During the hospital stay he was treated with IV diuresis for acute on chronic heart failure.  He was also on oral vancomycin for recent C diff infection.  He was discharged March 22nd with a creatinine around 1.7.     Patient now presents yet again to the hospital April 1st and is admitted to the hospital by hospital medicine with lower extremity cellulitis.  We are consulted to assist in the care of this patient with renal transplant and CKD.  This morning his potassium was 3.3, BUN 35 and creatinine 1.9.      Interval History     Overnight/currently:  Chart reviewed, patient seen and examined.  He does still appear somewhat confused but was able to tell me his name, date of birth, current year and his location.  He does endorse some diarrhea from the antibiotics.  No shortness of breath, chest pain, nausea/vomiting.  Creatinine improving, IV fluids discontinued.     Health Status   Allergies:    is allergic to lisinopril, actos  [pioglitazone], and metformin.    Current medications:   Current Medications[1]     Physical Examination   VS/Measurements   BP (!) 123/57   Pulse 105   Temp 97.6 °F (36.4 °C) (Oral)   Resp 19   Ht  6' (1.829 m)   Wt 101 kg (222 lb 10.6 oz)   SpO2 98%   BMI 30.20 kg/m²      General:  Alert, No acute distress.         Nutritional status: Obese.    Neck:  Supple, No lymphadenopathy.     Respiratory:  Lungs are clear to auscultation, Respirations are non-labored, Symmetrical chest wall expansion.    Cardiovascular:  Normal rate, Regular rhythm.   Gastrointestinal:  Soft, Non-tender, Normal bowel sounds.   Integumentary:  Warm, Dry.   Extremities:  Trace BLE edema.  Psychiatric:  Cooperative.     Review / Management   Laboratory Results   Today's Lab Results :    Recent Results (from the past 24 hours)   POCT glucose    Collection Time: 04/10/25 11:48 AM   Result Value Ref Range    POCT Glucose 153 (H) 70 - 110 mg/dL   POCT glucose    Collection Time: 04/10/25  4:10 PM   Result Value Ref Range    POCT Glucose 122 (H) 70 - 110 mg/dL   POCT glucose    Collection Time: 04/10/25  9:04 PM   Result Value Ref Range    POCT Glucose 84 70 - 110 mg/dL   POCT glucose    Collection Time: 04/10/25 11:39 PM   Result Value Ref Range    POCT Glucose 129 (H) 70 - 110 mg/dL   CBC with Differential    Collection Time: 04/11/25  4:57 AM   Result Value Ref Range    WBC 2.79 (L) 3.90 - 12.70 K/uL    RBC 3.12 (L) 4.60 - 6.20 M/uL    HGB 7.5 (L) 14.0 - 18.0 gm/dL    HCT 26.8 (L) 40.0 - 54.0 %    MCV 86 82 - 98 fL    MCH 24.0 (L) 27.0 - 31.0 pg    MCHC 28.0 (L) 32.0 - 36.0 g/dL    RDW 17.7 (H) 11.5 - 14.5 %    Platelet Count 238 150 - 450 K/uL    MPV 11.2 9.2 - 12.9 fL    Nucleated RBC 1 (H) <=0 /100 WBC   Manual Differential    Collection Time: 04/11/25  4:57 AM   Result Value Ref Range    Gran # (ANC) 1.1 K/uL    Segmented Neutrophil % 38.0 38.0 - 73.0 %    Bands % 1.0 %    Lymphocyte % 41.0 18.0 - 48.0 %    Monocyte % 16.0 (H) 4.0 - 15.0 %    Eosinophil % 2.0 0.0 - 8.0 %    Myelocyte % 2.0 %    Platelet Estimate Appears Normal      Anisocytosis Slight     Poik Slight     Hypochromia Occasional     Ovalocytes Occasional      Schistocyte Present    Renal Function Panel    Collection Time: 04/11/25  4:58 AM   Result Value Ref Range    Sodium 139 136 - 145 mmol/L    Potassium 3.1 (L) 3.5 - 5.1 mmol/L    Chloride 113 (H) 95 - 110 mmol/L    CO2 18 (L) 23 - 29 mmol/L    Glucose 85 70 - 110 mg/dL    BUN 40 (H) 8 - 23 mg/dL    Creatinine 1.9 (H) 0.5 - 1.4 mg/dL    Calcium 7.5 (L) 8.7 - 10.5 mg/dL    Albumin 1.3 (L) 3.5 - 5.2 g/dL    Phosphorus Level 2.6 (L) 2.7 - 4.5 mg/dL    Anion Gap 8 8 - 16 mmol/L    eGFR 37 (L) >60 mL/min/1.73/m2   POCT glucose    Collection Time: 04/11/25  6:17 AM   Result Value Ref Range    POCT Glucose 93 70 - 110 mg/dL        Impression and Plan   Diagnosis   Acute kidney injury superimposed upon CKD.  ANGELITO likely due to volume depletion.    -creatinine has improved from 2.6-1.9 overnight.  He was on IV fluids however these have been discontinued.  Encouraged adequate hydration with water.    CKD stage 3/chronic allograft nephropathy with baseline creatinine running 1.3-1.5.  Follows with Dr. Gonsalez.    Status post living related donor kidney transplantation in 2015.  Follows with Dr. Gonsalez as above.  On tacrolimus and on prednisone.  Mycophenolate has been on hold due to infection.  Likely can be resumed upon discharge.  -his Prograf level from 4/9 was elevated at 9.1 and his Prograf was reduced to 3 and 3.  Repeat Prograf level pending.    Cellulitis of LLE.  He has completed his antibiotics.    Hypertension.  His blood pressure is currently stable.    Hypokalemia.  Replaced this morning.  Magnesium from a couple of days ago okay.  Consider rechecking in the morning if still here.    CKD-MBD phosphorus mildly low, replaced this morning.  On calcitriol outpatient.  Corrected calcium currently normal.    Anemia.  On oral iron.  Monitor for need of transfusion.    History of combined heart failure.  LVEF around 30-40%.  Not currently on any diuretics.  Appears euvolemic for the most part.    Gout.  On  allopurinol.    History of DVT.  On Eliquis.    Type 2 DM.  Management per primary team.    Obesity.  Complicating his management.    Recent C diff infection.  S/p vancomycin    Acute encephalopathy.  Appears to be somewhat improving.  Brain MRI negative.        ______________________________________________  FABRICIO Rosas    This document was created using voice recognition software.  It is possible that there are errors which have persisted after original proofreading.  If there is a question regarding contents of this document please contact me for clarification.       [1]   Current Facility-Administered Medications:     acetaminophen suppository 650 mg, 650 mg, Rectal, Q6H PRN, Mihir Cao NP    acetaminophen tablet 650 mg, 650 mg, Oral, Q6H PRN, Mihir Cao NP    allopurinol split tablet 50 mg, 50 mg, Oral, Daily, Carlos Mathew MD, 50 mg at 04/11/25 0846    apixaban tablet 5 mg, 5 mg, Oral, BID, Carlos Mathew MD, 5 mg at 04/11/25 0846    aspirin EC tablet 81 mg, 81 mg, Oral, Daily, Carlos Mathew MD, 81 mg at 04/11/25 0846    dextrose 50% injection 12.5 g, 12.5 g, Intravenous, PRN, Mihir Cao NP    dextrose 50% injection 25 g, 25 g, Intravenous, PRN, Mihir Cao NP    ferrous sulfate tablet 1 each, 1 tablet, Oral, Daily, Carlos Mathew MD, 1 each at 04/11/25 0846    glucagon (human recombinant) injection 1 mg, 1 mg, Intramuscular, PRN, Mihir Cao NP    glucose chewable tablet 16 g, 16 g, Oral, PRN, Mihir Cao NP    glucose chewable tablet 24 g, 24 g, Oral, PRN, Mihir Cao NP    HYDROcodone-acetaminophen 5-325 mg per tablet 1 tablet, 1 tablet, Oral, Q6H PRN, iMhir Cao NP, 1 tablet at 04/09/25 0551    insulin aspart U-100 pen 0-5 Units, 0-5 Units, Subcutaneous, QID (AC + HS) PRN, Mihir Cao, NP, 1 Units at 04/09/25 2057    insulin glargine U-100 (Lantus) pen 16 Units, 16 Units, Subcutaneous, BID, Zeenat Cordova MD, 16  Units at 04/11/25 0847    melatonin tablet 6 mg, 6 mg, Oral, Nightly PRN, Mihir Cao NP    metoprolol succinate (TOPROL-XL) 24 hr tablet 25 mg, 25 mg, Oral, Daily, Dottie North NP, 25 mg at 04/11/25 0846    naloxone 0.4 mg/mL injection 0.02 mg, 0.02 mg, Intravenous, PRN, Mihir Cao NP    ondansetron injection 4 mg, 4 mg, Intravenous, Q8H PRN, Mihir Cao NP    pantoprazole EC tablet 40 mg, 40 mg, Oral, Daily, Carlos Mathew MD, 40 mg at 04/11/25 0846    polyethylene glycol packet 17 g, 17 g, Oral, Daily PRN, Mihir Cao NP    predniSONE tablet 5 mg, 5 mg, Oral, Daily, Carlos Mathew MD, 5 mg at 04/11/25 0846    promethazine tablet 25 mg, 25 mg, Oral, Q6H PRN, Mihir Cao NP    QUEtiapine tablet 25 mg, 25 mg, Oral, QHS, Zeenat Cordova MD, 25 mg at 04/10/25 2105    simethicone chewable tablet 80 mg, 1 tablet, Oral, QID PRN, Mihir Cao NP    sodium bicarbonate tablet 650 mg, 650 mg, Oral, BID, Carlos Mathew MD, 650 mg at 04/11/25 0846    sodium chloride 0.9% flush 3 mL, 3 mL, Intravenous, Q12H PRN, Mihir Cao NP    tacrolimus capsule 3 mg, 3 mg, Oral, Daily PM, Noel Jimenes MD, 3 mg at 04/10/25 1743    tacrolimus capsule 3 mg, 3 mg, Oral, Daily AM, Noel Jimenes MD, 3 mg at 04/11/25 0846

## 2025-04-11 NOTE — ASSESSMENT & PLAN NOTE
Started on cefepime on admission 4/1/25  De-escalated to Keflex 4/5/25  Cellulitis appears to be improving   Completed total 10d course of abx while inpatient   Continue wound care per Wound Care recommendations

## 2025-04-11 NOTE — ASSESSMENT & PLAN NOTE
"Patient has Combined Systolic and Diastolic heart failure that is Chronic. On presentation their CHF was well compensated. Most recent BNP and echo results are listed below.  No results for input(s): "BNP" in the last 72 hours.  Latest ECHO  Results for orders placed during the hospital encounter of 03/13/25    Echo Saline Bubble? No    Interpretation Summary    Left Ventricle: The left ventricle is normal in size. Mildly increased ventricular mass. Mildly increased wall thickness. There is mild concentric hypertrophy. Normal wall motion. Septal motion is consistent with bundle branch block. There is moderately reduced systolic function with a visually estimated ejection fraction of 35 - 40%. Grade I diastolic dysfunction.    Right Ventricle: The right ventricle is normal in size. Wall thickness is normal. Systolic function is normal.    Left Atrium: Mildly dilated    Aorta: Aortic annulus is mildly dilated measuring 4.01 cm. Ascending aorta is normal measuring 3.69 cm.    Pulmonary Artery: The estimated pulmonary artery systolic pressure is 24 mmHg.    IVC/SVC: Normal venous pressure at 3 mmHg.    Current Heart Failure Medications  metoprolol succinate (TOPROL-XL) 24 hr tablet 25 mg, Daily, Oral    Plan  -Monitor strict I&Os and daily weights.    -Place on telemetry  -Low sodium diet  -Place on fluid restriction of 1.5 L.   -Cardiology has not been consulted  -The patient's volume status is at their baseline  -Continue Beta-blocke. Hold Entresto and lasix until PCP/CHF clinic followup     "

## 2025-04-11 NOTE — ASSESSMENT & PLAN NOTE
Patient currently on Prograf, CellCept as outpatient   - resume Cellcept on discharge   Nephrology consulted for management of immunosuppressant medications    04/11 Prograf level 5.6- discussed with nephrology who recommend to continue current dose of Prograf 3 BID until outpatient followup  - f/u Dr. Gonsalez within 1-2 weeks

## 2025-04-11 NOTE — ASSESSMENT & PLAN NOTE
Wife reports 2 day history of worsening confusion from patient's baseline, suspect hospital delirium versus medication induced as patient was recently on cefepime.    Cefepime discontinued on 0 4/0 4   Minimize sedating meds   Mental status improved with treatment with Seroquel   Repeat UA negative for UTI, B12 elevated.  TSH, ammonia within normal limits.  Folic acid wnl.  MRI brain with no acute findings

## 2025-04-11 NOTE — TELEPHONE ENCOUNTER
----- Message from  Corina sent at 4/11/2025 10:41 AM CDT -----  Regarding: Hospital F/u  Hi! Patient is discharging today and needs 1-2 week follow up scheduled. Please coordinate appointment with pt/family. Thanks!!

## 2025-04-11 NOTE — ASSESSMENT & PLAN NOTE
Patient's FSGs are controlled on current medication regimen.  Last A1c reviewed-   Lab Results   Component Value Date    HGBA1C 5.7 (H) 12/17/2024     Most recent fingerstick glucose reviewed-   Recent Labs   Lab 04/10/25  2104 04/10/25  2339 04/11/25  0617 04/11/25  1140   POCTGLUCOSE 84 129* 93 133*

## 2025-04-11 NOTE — PHYSICIAN QUERY
Please clarify/provide the diagnosis associated with the clinical findings.  Metabolic encephalopathy superimposed on delirium

## 2025-04-17 ENCOUNTER — TELEPHONE (OUTPATIENT)
Dept: HOME HEALTH SERVICES | Facility: CLINIC | Age: 70
End: 2025-04-17
Payer: COMMERCIAL

## 2025-04-17 ENCOUNTER — OFFICE VISIT (OUTPATIENT)
Dept: HOME HEALTH SERVICES | Facility: CLINIC | Age: 70
End: 2025-04-17
Payer: COMMERCIAL

## 2025-04-17 DIAGNOSIS — D61.818 PANCYTOPENIA: ICD-10-CM

## 2025-04-17 DIAGNOSIS — G93.40 ACUTE ENCEPHALOPATHY: ICD-10-CM

## 2025-04-17 DIAGNOSIS — L03.116 CELLULITIS OF LEFT LEG: ICD-10-CM

## 2025-04-17 DIAGNOSIS — J41.1 MUCOPURULENT CHRONIC BRONCHITIS: ICD-10-CM

## 2025-04-17 DIAGNOSIS — Z09 HOSPITAL DISCHARGE FOLLOW-UP: Primary | ICD-10-CM

## 2025-04-17 DIAGNOSIS — Z94.0 HISTORY OF KIDNEY TRANSPLANT: ICD-10-CM

## 2025-04-17 DIAGNOSIS — I82.592 CHRONIC DEEP VEIN THROMBOSIS (DVT) OF OTHER VEIN OF LEFT LOWER EXTREMITY: ICD-10-CM

## 2025-04-17 PROCEDURE — 3074F SYST BP LT 130 MM HG: CPT | Mod: CPTII,S$GLB,,

## 2025-04-17 PROCEDURE — 99349 HOME/RES VST EST MOD MDM 40: CPT | Mod: S$GLB,,,

## 2025-04-17 PROCEDURE — 3078F DIAST BP <80 MM HG: CPT | Mod: CPTII,S$GLB,,

## 2025-04-17 PROCEDURE — 1111F DSCHRG MED/CURRENT MED MERGE: CPT | Mod: CPTII,S$GLB,,

## 2025-04-17 PROCEDURE — 4010F ACE/ARB THERAPY RXD/TAKEN: CPT | Mod: CPTII,S$GLB,,

## 2025-04-17 NOTE — TELEPHONE ENCOUNTER
Attempted to contact pt spouse Michelle to confirm [facility] home visit with NP today. The NP is looking to arrive between [phone]. Waiting for confirmation from patient or spouse.

## 2025-04-17 NOTE — PROGRESS NOTES
[facility]  Transitional Care Management (TCM) Home Visit    Encounter Provider: Javy Su   PCP: Valery Caal MD  Consult Requested By: Dr. Zeenat Cordova  Admit Date: 4/1/25   IP Discharge Date: 4/11/25  Hospital Length of Stay:RRHLOS@ days  Days since discharge (from IP or SNF): 6  [facility] Contact Note:   Hospital Diagnosis: Cellulitis of left leg [L03.116];History of kidney transplant [Z94.0];Acute encephalopathy [G93.40]     HISTORY OF PRESENT ILLNESS      Patient ID: Mitch Whittaker is a 69 y.o. male was recently admitted to the hospital, this is their TCM encounter.    Hospital Course Synopsis:  Mitch Whittaker is a 69 y.o. 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), Diabetic retinopathy, DM (diabetes mellitus), type 2 with complications (1994), Edema, End stage kidney disease, Gallbladder polyp, Heart failure, diastolic, due to HTN, Hemodialysis status, 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 who presented to the ED for further evaluation of worsening left leg pain and swelling which began last night.  Patient reports being bed-bound but  suffers from chronic venous stasis and recurrent skin infections.  Patient reported symptoms began acutely with no known alleviating or aggravating factors noted with a associated symptoms including nonproductive cough in addition to those noted above.  He denied endorsing any recent trauma to the affected area and reported being in his usual state of health prior to onset of symptoms.  All other review of systems negative except as noted above.  Initial workup in the ED revealed patient met SIRS criteria for sepsis with concerns for underlying cellulitis and was initiated on cefepime.  Remaining laboratory workup revealed H/H 9.6/31.8, potassium 2.8, creatinine/GFR 1.9/37, blood glucose 61, magnesium 1.1, troponin 0.152, lactic acid within normal limits, procalcitonin 1.60, chest x-ray negative for acute findings.  Patient admitted to Hospital Medicine inpatient for continued medical management.    Hospital Course:   4/2/25  Patient admitted or LLE cellulitis, continue cefepime  Noted bilateral chronic venous stasis, Wound Care consulted  Reports BLE weakness x 2 months, difficulty ambulating  Replete K and Mg  PT/OT consult     4/3/25  Patient with dysphagia and early satiety, ST consulted  Hx of Tacrolimus induced GI toxicity  Upper GI FL pending  Patient with renal transplant, on tacrolimus and cellcept  Restart tacrolimus, consult Nephrology for assitance in transplant management  Continue cefepime for LLE celulitis  PT/OT with rec for JOSE, consult  for SNF placement     4/4/25  NAEON, patient resting in bed, no complaints  Blood cultures NGTD, check procal, possibly de-escalate cefepime 1-2 days  On IDDSI level 5 diet per  rec, appreciate assistance, will plan for outpatient GI referral  Stop famotidine, start Protonix, plan to continue on discharge     4/5/25  NAEON, AM labs pending  LLE cellulitis improving  Cr 1.8, Nephrology following, on Tacrolimus  Family at bedside, updated     4/6/25  BRIELLE, Ugo  1.8 --> 2.3, tacrolimus level pending  Continue Keflex for cellulitis   Patient reports fatigue, generalized weakness  Consult PT/OT, may benefit from SNF/rehab     04/07/2025   -patient is seen and examined with wife and RN at bedside.  Per wife, patient has been more confused over the weekend, reports pain all over and refusing some care, stating he feels like he is going to die.  On evaluation, he is oriented to self and place but not situation.  Refusing most of exam.  Question delirium secondary to hospitalization versus due to recent use of cefepime.     04/08/2025  No acute events overnight.  Patient with sinus tachycardia on tele monitor, T-max 100.4°.  Patient is seen and examined with nurse tech at bedside, no family present during morning rounds.  Patient awake but does not answer questions, when extremities are palpated he states please don't do that ma'a.m.  Moving all extremities spontaneously but does not follow commands.  We will obtain MRI brain further evaluate altered mental status.     04/09  MRI Brain with no acute findings. Per case management, family declined SNF placement for patient.   Pt mental status waxing/waning. He is slightly more conversant this morning, oriented x 1. Reports he did not sleep well overnight, reports there were demons in his room but that he feels better this AM.      04/10/2025  Pt seen and examined with therapists at bedside, he is much more alert and coherent today. Oriented to self, place, year, answers questions appropriately. Reports some L leg pain. Denies CP, SOB. Discussed with nephrology who recommend continuation of IVF as pt po intake is poor and Cr downtrending with hydration. Completed 10d course of abx, Keflex discontinued.      04/11/2025  No acute events overnight.  Patient is seen and examined with spouse at bedside.  Patient is much more alert this morning, oriented x3.  Main complaint is pain to bilateral legs.  Denies chest pain, shortness of  breath.  Creatinine improved to 1.9, IV fluids discontinued.  Potassium/phosphorus repleted.  Tacrolimus level returned at 5.6--discussed with nephrology team Dr. Magallanes-we will continue Prograf at current dose of 3 mg b.i.d. until patient can follow up with Nephrology and have repeat labs done, resume mycophenolate on discharge.  Patient is seen and examined on day of discharge and appears stable for discharge with home health per my exam.  Follow up with PCP within 3-5 days ([facility]), followup with nephrology within 1-2 weeks      Patient is being seen today for a hospital follow up. Recent admission for evaluation of worsening left leg pain and swelling. Spouse is present during visit and provides some of the history. Reports improvement since discharge. Denies chest pain, SOB. Currently bed bound. [facility] and PT/OT working with patient. Spouse reports low blood sugar readings since discharge. Readings in 50's couple mornings and afternoons. Currently on lantus 15 u twice daily. Will decrease to 10 u twice daily and follow up with diabetes management. Denies further complaints or concerns. Questions elicited. Time allowed for questions, all issues addressed. Contact info given for any concerns or changes.   DECISION MAKING TODAY       Assessment & Plan:  1. Hospital discharge follow-up    2. Cellulitis of left leg  Comments:  completed 10 day antibiotic course  continue HH with wound care  Orders:  -     Ambulatory referral/consult to [facility]    3. Acute encephalopathy  Comments:  resolved  patient at baseline  Orders:  -     Ambulatory referral/consult to [facility]    4. History of kidney transplant  Comments:  Continue Cellcept, Prograf  F/U with Dr. Gonsalez 5/1  Orders:  -     Ambulatory referral/consult to [facility]    5. Chronic deep vein thrombosis (DVT) of other vein of left lower extremity  Assessment & Plan:  Chronic,  stable  Continue eliquis      6. Mucopurulent chronic bronchitis  Assessment & Plan:  Chronic, stable  No acute issues  F/U with pulmonology       7. Pancytopenia  Assessment & Plan:  Chronic, stable  Followed by hem/onc           Medication List on Discharge:     Medication List            Accurate as of April 17, 2025 11:59 PM. If you have any questions, ask your nurse or doctor.                CONTINUE taking these medications      * albuterol 2.5 mg /3 mL (0.083 %) nebulizer solution  Commonly known as: PROVENTIL  Take 3 mLs (2.5 mg total) by nebulization every 4 to 6 hours as needed for Wheezing or Shortness of Breath.     * albuterol 90 mcg/actuation inhaler  Commonly known as: PROVENTIL/VENTOLIN HFA  Inhale 2 puffs into the lungs every 4 (four) hours as needed for Wheezing or Shortness of Breath.     allopurinoL 100 MG tablet  Commonly known as: ZYLOPRIM  Take ½ tablet (50 mg total) by mouth once daily.     aspirin 81 MG EC tablet  Commonly known as: ECOTRIN  Take 1 tablet by mouth Daily.     calcitRIOL 0.25 MCG Cap  Commonly known as: ROCALTROL  Take 1 capsule (0.25 mcg total) by mouth once daily.     ELIQUIS 5 mg Tab  Generic drug: apixaban  Take 1 tablet (5 mg total) by mouth 2 (two) times daily.     famotidine 20 MG tablet  Commonly known as: PEPCID  TAKE ONE TABLET BY MOUTH EVERY EVENING     FeroSuL 325 mg (65 mg iron) Tab tablet  Generic drug: ferrous sulfate  Take 1 tablet (325 mg total) by mouth daily with breakfast.     fish oil-omega-3 fatty acids 300-1,000 mg capsule  Take 1 g by mouth once daily.     furosemide 40 MG tablet  Commonly known as: LASIX  Take 1 tablet (40 mg total) by mouth once daily.     HYDROcodone-acetaminophen 5-325 mg per tablet  Commonly known as: NORCO  Take 1 tablet by mouth every 6 (six) hours as needed for Pain.     * insulin aspart U-100 100 unit/mL (3 mL) Inpn pen  Commonly known as: NovoLOG  Inject 0-10 Units into the skin before meals and at bedtime as needed  "(Hyperglycemia). **MODERATE CORRECTION DOSE**  Blood Glucose  mg/dL                  Pre-meal                2200  151-200                2 units                    1 unit  201-250                4 units                    2 units  251-300                6 units                    3 units  301-350                8 units                    4 units  >350                     10 units                  5 units  Administer subcutaneously if needed at times designated by monitoring  schedule.  DO NOT HOLD correction dose insulin for patients who are  NPO.    "HIGH ALERT MEDICATION" - Administer with meals or TF/TPN.     * NovoLOG Flexpen U-100 Insulin 100 unit/mL (3 mL) Inpn pen  Generic drug: insulin aspart U-100  Inject 25 Units into the skin 3 (three) times daily with meals.     ketoconazole 200 mg Tab  Commonly known as: NIZORAL  Take ½ tablet (100 mg total) by mouth once daily.     LANTUS SOLOSTAR U-100 INSULIN 100 unit/mL (3 mL) Inpn pen  Generic drug: insulin glargine U-100 (Lantus)  Inject 15 Units into the skin 2 (two) times daily.     metoprolol succinate 25 MG 24 hr tablet  Commonly known as: TOPROL-XL  Take 1 tablet (25 mg total) by mouth once daily.     multivitamin tablet  Commonly known as: THERAGRAN  Take 1 tablet by mouth once daily.     mycophenolate 250 mg Cap  Commonly known as: CELLCEPT  Take 2 capsules (500 mg total) by mouth 2 (two) times daily.     OZEMPIC 2 mg/dose (8 mg/3 mL) Pnij  Generic drug: semaglutide  Inject 2 mg into the skin every 7 days.     predniSONE 5 MG tablet  Commonly known as: DELTASONE  Take 1 tablet (5 mg total) by mouth once daily.     rosuvastatin 40 MG Tab  Commonly known as: CRESTOR  Take 1 tablet (40 mg total) by mouth once daily.     sodium bicarbonate 650 MG tablet  Take 1 tablet (650 mg total) by mouth 2 (two) times daily.     tacrolimus 1 MG Cap  Commonly known as: PROGRAF  Take 3 capsules (3 mg total) by mouth every morning AND 3 capsules (3 mg total) every evening. " Until followup with nephrology.     triamcinolone acetonide 0.1% 0.1 % ointment  Commonly known as: KENALOG  Apply topically 2 (two) times daily. Use on bilateral lower legs.           * This list has 4 medication(s) that are the same as other medications prescribed for you. Read the directions carefully, and ask your doctor or other care provider to review them with you.                  Medication Reconciliation:  Were medications changed on discharge? Yes  Were medications in the home? Yes  Is the patient taking the medications as directed? Yes  Does the patient understand the medications and changes? Yes  Does updated med list accurately reflects meds patient is currently taking? Yes    ENVIRONMENT OF CARE      Family and/or Caregiver present at visit?  Yes  Name of Caregiver: family  History provided by: patient and caregiver    Advance Care Planning   Advanced Care Planning Status:  Patient has had an ACP conversation  Living Will: No  Power of : No  [place]POST: No    Does Caregiver have HCPoA: No  Changes today: none  Is patient hospice appropriate: No  (If needed, use PPS <30 or FAST score >7)  Was referral to hospice placed: No       Impression upon entering the home:  Physical Dwelling: single family home   Appearance of home environment: cleaniness: clean, walking pathways: clear, lighting: adequate, and home structure: sound structure  Functional Status: moderate assistance  Mobility: bedbound  Nutritional access: available food but inadequate intake  [facility]    DME/Supplies: hospital bed and wound care supplies     Diagnostic tests reviewed/disposition: I have reviewed all completed as well as pending diagnostic tests at the time of discharge.  Disease/illness education: Take all medication as prescribed. Activity as tolerated. Keep all upcoming appts.   Establishment or re-establishment of referral orders for community resources: No other necessary community  resources.   Discussion with other health care providers: No discussion with other health care providers necessary.   Does patient have a PCP at [place]? Yes   Repatriation plan with PCP? [facility] at Home reason: mobility   Does patient have an ostomy (ileostomy, colostomy, suprapubic catheter, nephrostomy tube, tracheostomy, PEG tube, pleurex catheter, cholecystostomy, etc)? No  Were BPAs reviewed? Yes    Social History     Socioeconomic History    Marital status: Legally     Number of children: 2   Occupational History    Occupation: retired     Employer: Retired   Tobacco Use    Smoking status: Former     Current packs/day: 0.00     Types: Cigarettes     Quit date: 2013     Years since quittin.9     Passive exposure: Past    Smokeless tobacco: Former     Quit date: 2013    Tobacco comments:     used marijuana since 4447-7710, stopped after started dialysis   Substance and Sexual Activity    Alcohol use: No     Alcohol/week: 0.0 standard drinks of alcohol    Drug use: Not Currently     Comment:      Sexual activity: Never   Social History Narrative    . Lives with spouse. Has 2 children. Patient retired as  for [employer]. He has been washing cars.     Social Drivers of Health     Financial Resource Strain: Low Risk  (3/31/2025)    Overall Financial Resource Strain (CARDIA)     Difficulty of Paying Living Expenses: Not hard at all   Food Insecurity: No Food Insecurity (3/31/2025)    Hunger Vital Sign     Worried About Running Out of Food in the Last Year: Never true     Ran Out of Food in the Last Year: Never true   Transportation Needs: No Transportation Needs (3/31/2025)    PRAPARE - Transportation     Lack of Transportation (Medical): No     Lack of Transportation (Non-Medical): No   Physical Activity: Insufficiently Active (3/31/2025)    Exercise Vital Sign     Days of Exercise per Week: 2 days     Minutes of Exercise per Session: 30 min   Stress: No Stress Concern  Present (3/31/2025)    [facility] Occupational Health - Occupational Stress Questionnaire     Feeling of Stress : Not at all   Housing Stability: Low Risk  (3/31/2025)    Housing Stability Vital Sign     Unable to Pay for Housing in the Last Year: No     Number of Times Moved in the Last Year: 0     Homeless in the Last Year: No       OBJECTIVE:     Vital Signs:  Vitals:    04/17/25 1205   BP: 126/60   Pulse: 91   Resp: 18       Review of Systems   Constitutional: Negative.    HENT: Negative.     Eyes: Negative.    Respiratory: Negative.  Negative for chest tightness.    Cardiovascular:  Positive for leg swelling.   Gastrointestinal: Negative.    Endocrine: Negative.    Genitourinary: Negative.    Musculoskeletal: Negative.    Skin:  Positive for wound.   Allergic/Immunologic: Negative.    Neurological:  Positive for weakness.   Hematological: Negative.    Psychiatric/Behavioral: Negative.  Negative for agitation.    All other systems reviewed and are negative.      Physical Exam:  Physical Exam  Vitals reviewed.   Constitutional:       General: He is not in acute distress.     Appearance: Normal appearance. He is well-developed. He is obese. He is ill-appearing.   HENT:      Head: Normocephalic and atraumatic.      Nose: Nose normal.      Mouth/Throat:      Mouth: Mucous membranes are dry.      Pharynx: Oropharynx is clear.   Eyes:      Pupils: Pupils are equal, round, and reactive to light.   Cardiovascular:      Rate and Rhythm: Normal rate and regular rhythm.      Pulses: Normal pulses.      Heart sounds: Normal heart sounds.   Pulmonary:      Effort: Pulmonary effort is normal.      Breath sounds: Normal breath sounds.   Abdominal:      General: Bowel sounds are normal.      Palpations: Abdomen is soft.   Musculoskeletal:         General: Normal range of motion.      Cervical back: Normal range of motion and neck supple.      Right lower leg: Edema present.      Left lower leg: Edema present.    Skin:     General: Skin is warm and dry.      Findings: Erythema present.   Neurological:      General: No focal deficit present.      Mental Status: He is alert and oriented to person, place, and time. Mental status is at baseline.      Motor: Weakness present.   Psychiatric:         Mood and Affect: Mood normal.         Behavior: Behavior normal.         Thought Content: Thought content normal.         Judgment: Judgment normal.         INSTRUCTIONS FOR PATIENT:   - Continue all medications, treatments and therapies as ordered.   - Follow all instructions, recommendations as discussed.  - Maintain Safety Precautions at all times.  - Attend all medical appointments as scheduled.  - For worsening symptoms: call Primary Care Physician or Nurse Practitioner.  - For emergencies, call 911 or immediately report to the nearest emergency room.   Scheduled Follow-up, Appts Reviewed with Modifications if Needed: Yes  Future Appointments   Date Time Provider Department [place]   4/24/2025  9:30 AM LABORATORY, OPAO [facility] [place]   4/24/2025  9:40 AM LABORATORY, XU [facility] [place]   4/30/2025  1:30 PM [facility] LIMIT 500 LBS [facility] SCAN [place]   4/30/2025  2:00 PM PULMONARY LAB 2, [facility]   4/30/2025  2:45 PM PULMONARY LAB 2, [facility]   4/30/2025  3:20 PM Valdo Mason MD [facility] PULMS[facility]   5/1/2025  1:00 PM Hany Gonsalez MD [facility] NEPHRO [place]   5/9/2025  3:30 PM Sofía Geller NP [facility] DIABETE [facility]   6/11/2025  2:00 PM Rosio Mittal PA-C [facility] IM [facility]       Signature: Javy Su NP    Transition of Care Visit:  I have reviewed and updated the history and problem list.  I have reconciled the medication list.  I have discussed the hospitalization and current medical issues, prognosis and plans with the patient/family.

## 2025-04-21 VITALS
SYSTOLIC BLOOD PRESSURE: 126 MMHG | RESPIRATION RATE: 18 BRPM | OXYGEN SATURATION: 98 % | HEART RATE: 91 BPM | DIASTOLIC BLOOD PRESSURE: 60 MMHG

## 2025-04-21 PROBLEM — J41.1 MUCOPURULENT CHRONIC BRONCHITIS: Status: ACTIVE | Noted: 2025-04-21

## 2025-04-21 PROBLEM — I82.592 CHRONIC DEEP VEIN THROMBOSIS (DVT) OF OTHER VEIN OF LEFT LOWER EXTREMITY: Status: ACTIVE | Noted: 2025-04-21

## 2025-04-21 PROBLEM — D61.818 PANCYTOPENIA: Status: ACTIVE | Noted: 2025-04-21

## 2025-04-21 NOTE — TELEPHONE ENCOUNTER
No care due was identified.  [facility] Embedded Care Due Messages. Reference number: [id number].   4/21/2025 5:09:10 AM CDT

## 2025-04-22 DIAGNOSIS — E11.21 TYPE 2 DIABETES MELLITUS WITH DIABETIC NEPHROPATHY, UNSPECIFIED WHETHER LONG TERM INSULIN USE: ICD-10-CM

## 2025-04-22 DIAGNOSIS — E11.649 UNCONTROLLED TYPE 2 DIABETES MELLITUS WITH HYPOGLYCEMIA WITHOUT COMA: ICD-10-CM

## 2025-04-22 RX ORDER — TACROLIMUS 1 MG/1
CAPSULE ORAL
Qty: 180 CAPSULE | Refills: 11 | OUTPATIENT
Start: 2025-04-22

## 2025-04-22 RX ORDER — BLOOD-GLUCOSE SENSOR
1 EACH MISCELLANEOUS
Qty: 3 EACH | Refills: 0 | Status: SHIPPED | OUTPATIENT
Start: 2025-04-22 | End: 2026-04-22

## 2025-04-22 RX ORDER — SODIUM BICARBONATE 650 MG/1
650 TABLET ORAL 2 TIMES DAILY
Qty: 60 TABLET | Refills: 0 | Status: CANCELLED | OUTPATIENT
Start: 2025-04-22 | End: 2025-05-22

## 2025-04-22 NOTE — TELEPHONE ENCOUNTER
Courtesy refill of Dex G7 given for coverage of MAGALIE Avila.     Ruthie Lazo PA-C  Diabetes Management

## 2025-04-22 NOTE — TELEPHONE ENCOUNTER
Refill Routing Note   Medication(s) are not appropriate for processing by [facility] for the following reason(s):        Required labs abnormal    [facility] action(s):  Defer               Appointments  past 12m or future 3m with PCP    Date Provider   Last Visit   3/31/2025 Valery Caal MD   Next Visit   Visit date not found Valery Caal MD   ED visits in past 90 days: 0        Note composed:8:25 PM 04/21/2025

## 2025-04-23 RX ORDER — INSULIN ASPART 100 [IU]/ML
25 INJECTION, SOLUTION INTRAVENOUS; SUBCUTANEOUS
Qty: 30 ML | Refills: 0 | Status: SHIPPED | OUTPATIENT
Start: 2025-04-23

## 2025-04-23 RX ORDER — TACROLIMUS 1 MG/1
4 CAPSULE ORAL EVERY MORNING
Qty: 630 CAPSULE | Refills: 3 | OUTPATIENT
Start: 2025-04-23

## 2025-04-28 ENCOUNTER — TELEPHONE (OUTPATIENT)
Dept: NEPHROLOGY | Facility: CLINIC | Age: 70
End: 2025-04-28
Payer: COMMERCIAL

## 2025-04-28 NOTE — TELEPHONE ENCOUNTER
----- Message from Paige sent at 4/28/2025 11:01 AM CDT -----  Contact: Michelle  Type:  Sooner Apoointment RequestCaller is requesting a sooner appointment.  Caller declined first available appointment listed below.  Caller will not accept being placed on the waitlist and is requesting a message be sent to doctor.Name of Caller:Michelle When is the first available appointment?6/9/25 Symptoms: hospital f/u and blood & specimen labs Would the patient rather a call back or a response via [facility]? [facility] Call Back Number:[phone] Additional Information: Pt would like to be seen virtually

## 2025-04-28 NOTE — TELEPHONE ENCOUNTER
----- Message from Tracy sent at 4/28/2025 11:54 AM CDT -----  Contact: ROMMEL MONTGOMERY [[id number]]  .Type:  Patient Requesting CallWho Called:ROMMEL MONTGOMERY [[id number]]Does the patient know what this is regarding?:Patient called in wanting to know if his appointment on 5/1 can be switched to virtual. I made a mistake and canceled the appointment. Can you please reschedule patient for 5/1/25 and call back in regards to switching it to virtualWould the patient rather a call back or a response via [facility]? [facility] Call Back Number:.[phone] Information:

## 2025-04-28 NOTE — TELEPHONE ENCOUNTER
Spoke with pt's wife, will reach out to [facility] to have repeat blood work done before appt. Advised that appt is currently scheduled on Thursday but may be changed if lab results are not resulted prior to appt, pt.'s wife verbalized understanding

## 2025-04-30 ENCOUNTER — LAB REQUISITION (OUTPATIENT)
Dept: LAB | Facility: HOSPITAL | Age: 70
End: 2025-04-30
Payer: COMMERCIAL

## 2025-04-30 DIAGNOSIS — N39.0 URINARY TRACT INFECTION, SITE NOT SPECIFIED: ICD-10-CM

## 2025-04-30 DIAGNOSIS — L03.116 CELLULITIS OF LEFT LOWER LIMB: ICD-10-CM

## 2025-04-30 DIAGNOSIS — N18.30 CHRONIC KIDNEY DISEASE, STAGE 3 UNSPECIFIED: ICD-10-CM

## 2025-04-30 LAB
ABSOLUTE NEUTROPHIL MANUAL (OHS): 4.5 K/UL
ALBUMIN SERPL BCP-MCNC: 1.5 G/DL (ref 3.5–5.2)
ANION GAP (OHS): 13 MMOL/L (ref 8–16)
ANISOCYTOSIS BLD QL SMEAR: SLIGHT
BILIRUB UR QL STRIP.AUTO: NEGATIVE
BUN SERPL-MCNC: 49 MG/DL (ref 8–23)
CALCIUM SERPL-MCNC: 7.9 MG/DL (ref 8.7–10.5)
CHLORIDE SERPL-SCNC: 98 MMOL/L (ref 95–110)
CLARITY UR: CLEAR
CO2 SERPL-SCNC: 28 MMOL/L (ref 23–29)
COLOR UR AUTO: YELLOW
CREAT SERPL-MCNC: 2.8 MG/DL (ref 0.5–1.4)
CREAT UR-MCNC: 31.6 MG/DL (ref 23–375)
ERYTHROCYTE [DISTWIDTH] IN BLOOD BY AUTOMATED COUNT: 16.4 % (ref 11.5–14.5)
GFR SERPLBLD CREATININE-BSD FMLA CKD-EPI: 23 ML/MIN/1.73/M2
GLUCOSE SERPL-MCNC: 168 MG/DL (ref 70–110)
GLUCOSE UR QL STRIP: NEGATIVE
HCT VFR BLD AUTO: 23.9 % (ref 40–54)
HGB BLD-MCNC: 7.1 GM/DL (ref 14–18)
HGB UR QL STRIP: ABNORMAL
HYPOCHROMIA BLD QL SMEAR: ABNORMAL
KETONES UR QL STRIP: NEGATIVE
LEUKOCYTE ESTERASE UR QL STRIP: NEGATIVE
LYMPHOCYTES NFR BLD MANUAL: 12 % (ref 18–48)
MCH RBC QN AUTO: 23.9 PG (ref 27–31)
MCHC RBC AUTO-ENTMCNC: 29.7 G/DL (ref 32–36)
MCV RBC AUTO: 81 FL (ref 82–98)
MONOCYTES NFR BLD MANUAL: 8 % (ref 4–15)
NEUTROPHILS NFR BLD MANUAL: 80 % (ref 38–73)
NITRITE UR QL STRIP: NEGATIVE
NUCLEATED RBC (/100WBC) (OHS): 0 /100 WBC
PH UR STRIP: 6 [PH]
PHOSPHATE SERPL-MCNC: 3.9 MG/DL (ref 2.7–4.5)
PLATELET # BLD AUTO: 250 K/UL (ref 150–450)
PMV BLD AUTO: 10.9 FL (ref 9.2–12.9)
POTASSIUM SERPL-SCNC: 2.4 MMOL/L (ref 3.5–5.1)
PROT UR QL STRIP: ABNORMAL
PROT UR-MCNC: 15 MG/DL
PROT/CREAT UR: 0.47 MG/G{CREAT}
RBC # BLD AUTO: 2.97 M/UL (ref 4.6–6.2)
SODIUM SERPL-SCNC: 139 MMOL/L (ref 136–145)
SP GR UR STRIP: 1.01
UROBILINOGEN UR STRIP-ACNC: NEGATIVE EU/DL
WBC # BLD AUTO: 5.65 K/UL (ref 3.9–12.7)

## 2025-04-30 PROCEDURE — 80069 RENAL FUNCTION PANEL: CPT | Performed by: INTERNAL MEDICINE

## 2025-04-30 PROCEDURE — 82570 ASSAY OF URINE CREATININE: CPT | Performed by: INTERNAL MEDICINE

## 2025-04-30 PROCEDURE — 81003 URINALYSIS AUTO W/O SCOPE: CPT | Performed by: INTERNAL MEDICINE

## 2025-04-30 PROCEDURE — 80197 ASSAY OF TACROLIMUS: CPT | Performed by: INTERNAL MEDICINE

## 2025-04-30 PROCEDURE — 85025 COMPLETE CBC W/AUTO DIFF WBC: CPT | Performed by: INTERNAL MEDICINE

## 2025-05-01 ENCOUNTER — OFFICE VISIT (OUTPATIENT)
Dept: NEPHROLOGY | Facility: CLINIC | Age: 70
End: 2025-05-01
Payer: COMMERCIAL

## 2025-05-01 DIAGNOSIS — Z94.0 KIDNEY TRANSPLANTED: Primary | ICD-10-CM

## 2025-05-01 LAB — TACROLIMUS BLD-MCNC: 10.7 NG/ML (ref 5–15)

## 2025-05-01 RX ORDER — POTASSIUM CHLORIDE 20 MEQ/1
20 TABLET, EXTENDED RELEASE ORAL 2 TIMES DAILY
Qty: 60 TABLET | Refills: 11 | Status: SHIPPED | OUTPATIENT
Start: 2025-05-01

## 2025-05-01 RX ORDER — SODIUM BICARBONATE 650 MG/1
650 TABLET ORAL DAILY
Qty: 30 TABLET | Refills: 11 | Status: SHIPPED | OUTPATIENT
Start: 2025-05-01 | End: 2025-05-31

## 2025-05-06 ENCOUNTER — HOSPITAL ENCOUNTER (INPATIENT)
Facility: HOSPITAL | Age: 70
LOS: 6 days | Discharge: HOME HEALTH | DRG: 291 | End: 2025-05-12
Attending: EMERGENCY MEDICINE | Admitting: INTERNAL MEDICINE
Payer: COMMERCIAL

## 2025-05-06 DIAGNOSIS — D61.818 PANCYTOPENIA: ICD-10-CM

## 2025-05-06 DIAGNOSIS — I50.9 CHF (CONGESTIVE HEART FAILURE): ICD-10-CM

## 2025-05-06 DIAGNOSIS — Z86.0100 HISTORY OF COLON POLYPS: ICD-10-CM

## 2025-05-06 DIAGNOSIS — D50.0 IRON DEFICIENCY ANEMIA DUE TO CHRONIC BLOOD LOSS: Primary | ICD-10-CM

## 2025-05-06 DIAGNOSIS — N18.4 CKD (CHRONIC KIDNEY DISEASE) STAGE 4, GFR 15-29 ML/MIN: ICD-10-CM

## 2025-05-06 DIAGNOSIS — I50.43 SYSTOLIC AND DIASTOLIC CHF, ACUTE ON CHRONIC: ICD-10-CM

## 2025-05-06 DIAGNOSIS — D84.9 IMMUNOSUPPRESSION: ICD-10-CM

## 2025-05-06 DIAGNOSIS — Z96.1 PSEUDOPHAKIA: ICD-10-CM

## 2025-05-06 DIAGNOSIS — I10 PRIMARY HYPERTENSION: ICD-10-CM

## 2025-05-06 DIAGNOSIS — R07.9 CHEST PAIN: ICD-10-CM

## 2025-05-06 PROBLEM — M25.50 ARTHRALGIA: Status: ACTIVE | Noted: 2025-05-06

## 2025-05-06 LAB
ABSOLUTE NEUTROPHIL MANUAL (OHS): 2.6 K/UL
ALBUMIN SERPL BCP-MCNC: 1.5 G/DL (ref 3.5–5.2)
ALP SERPL-CCNC: 230 UNIT/L (ref 40–150)
ALT SERPL W/O P-5'-P-CCNC: 27 UNIT/L (ref 10–44)
ANION GAP (OHS): 14 MMOL/L (ref 8–16)
ANISOCYTOSIS BLD QL SMEAR: SLIGHT
AST SERPL-CCNC: 42 UNIT/L (ref 11–45)
BASOPHILS NFR BLD MANUAL: 1 %
BILIRUB SERPL-MCNC: 0.4 MG/DL (ref 0.1–1)
BNP SERPL-MCNC: 738 PG/ML (ref 0–99)
BUN SERPL-MCNC: 79 MG/DL (ref 8–23)
CALCIUM SERPL-MCNC: 9.2 MG/DL (ref 8.7–10.5)
CHLORIDE SERPL-SCNC: 98 MMOL/L (ref 95–110)
CO2 SERPL-SCNC: 24 MMOL/L (ref 23–29)
CREAT SERPL-MCNC: 5.7 MG/DL (ref 0.5–1.4)
CRP SERPL-MCNC: 353.2 MG/L
ERYTHROCYTE [DISTWIDTH] IN BLOOD BY AUTOMATED COUNT: 17 % (ref 11.5–14.5)
ERYTHROCYTE [SEDIMENTATION RATE] IN BLOOD: >120 MM/HR
GFR SERPLBLD CREATININE-BSD FMLA CKD-EPI: 10 ML/MIN/1.73/M2
GLUCOSE SERPL-MCNC: 146 MG/DL (ref 70–110)
HCT VFR BLD AUTO: 25.1 % (ref 40–54)
HGB BLD-MCNC: 7.3 GM/DL (ref 14–18)
HYPOCHROMIA BLD QL SMEAR: ABNORMAL
LIPASE SERPL-CCNC: 16 U/L (ref 4–60)
LYMPHOCYTES NFR BLD MANUAL: 15 % (ref 18–48)
MCH RBC QN AUTO: 23.5 PG (ref 27–31)
MCHC RBC AUTO-ENTMCNC: 29.1 G/DL (ref 32–36)
MCV RBC AUTO: 81 FL (ref 82–98)
MONOCYTES NFR BLD MANUAL: 9 % (ref 4–15)
NEUTROPHILS NFR BLD MANUAL: 75 % (ref 38–73)
NUCLEATED RBC (/100WBC) (OHS): 0 /100 WBC
OVALOCYTES BLD QL SMEAR: ABNORMAL
PLATELET # BLD AUTO: 435 K/UL (ref 150–450)
PLATELET BLD QL SMEAR: ABNORMAL
PMV BLD AUTO: 10 FL (ref 9.2–12.9)
POCT GLUCOSE: 218 MG/DL (ref 70–110)
POTASSIUM SERPL-SCNC: 4.9 MMOL/L (ref 3.5–5.1)
PROT SERPL-MCNC: 6.5 GM/DL (ref 6–8.4)
RBC # BLD AUTO: 3.1 M/UL (ref 4.6–6.2)
SODIUM SERPL-SCNC: 136 MMOL/L (ref 136–145)
STOMATOCYTES BLD QL SMEAR: PRESENT
T4 FREE SERPL-MCNC: 0.93 NG/DL (ref 0.71–1.51)
T4 FREE SERPL-MCNC: 0.93 NG/DL (ref 0.71–1.51)
TROPONIN I SERPL DL<=0.01 NG/ML-MCNC: 0.06 NG/ML
TROPONIN I SERPL DL<=0.01 NG/ML-MCNC: 0.07 NG/ML
TSH SERPL-ACNC: 0.72 UIU/ML (ref 0.4–4)
URATE SERPL-MCNC: 11.5 MG/DL (ref 3.4–7)
WBC # BLD AUTO: 3.4 K/UL (ref 3.9–12.7)

## 2025-05-06 PROCEDURE — 83690 ASSAY OF LIPASE: CPT | Performed by: EMERGENCY MEDICINE

## 2025-05-06 PROCEDURE — 83880 ASSAY OF NATRIURETIC PEPTIDE: CPT | Performed by: EMERGENCY MEDICINE

## 2025-05-06 PROCEDURE — 84450 TRANSFERASE (AST) (SGOT): CPT | Performed by: EMERGENCY MEDICINE

## 2025-05-06 PROCEDURE — 99223 1ST HOSP IP/OBS HIGH 75: CPT | Mod: ,,, | Performed by: INTERNAL MEDICINE

## 2025-05-06 PROCEDURE — 99285 EMERGENCY DEPT VISIT HI MDM: CPT | Mod: 25

## 2025-05-06 PROCEDURE — 5A09357 ASSISTANCE WITH RESPIRATORY VENTILATION, LESS THAN 24 CONSECUTIVE HOURS, CONTINUOUS POSITIVE AIRWAY PRESSURE: ICD-10-PCS | Performed by: INTERNAL MEDICINE

## 2025-05-06 PROCEDURE — 36415 COLL VENOUS BLD VENIPUNCTURE: CPT | Performed by: NURSE PRACTITIONER

## 2025-05-06 PROCEDURE — 84484 ASSAY OF TROPONIN QUANT: CPT | Performed by: EMERGENCY MEDICINE

## 2025-05-06 PROCEDURE — 94761 N-INVAS EAR/PLS OXIMETRY MLT: CPT

## 2025-05-06 PROCEDURE — 63600175 PHARM REV CODE 636 W HCPCS: Performed by: EMERGENCY MEDICINE

## 2025-05-06 PROCEDURE — 21400001 HC TELEMETRY ROOM

## 2025-05-06 PROCEDURE — 96374 THER/PROPH/DIAG INJ IV PUSH: CPT

## 2025-05-06 PROCEDURE — 63600175 PHARM REV CODE 636 W HCPCS: Performed by: NURSE PRACTITIONER

## 2025-05-06 PROCEDURE — 84550 ASSAY OF BLOOD/URIC ACID: CPT | Performed by: NURSE PRACTITIONER

## 2025-05-06 PROCEDURE — 86140 C-REACTIVE PROTEIN: CPT | Performed by: NURSE PRACTITIONER

## 2025-05-06 PROCEDURE — 63600175 PHARM REV CODE 636 W HCPCS: Performed by: INTERNAL MEDICINE

## 2025-05-06 PROCEDURE — 84443 ASSAY THYROID STIM HORMONE: CPT | Performed by: NURSE PRACTITIONER

## 2025-05-06 PROCEDURE — 96361 HYDRATE IV INFUSION ADD-ON: CPT

## 2025-05-06 PROCEDURE — 85027 COMPLETE CBC AUTOMATED: CPT | Performed by: EMERGENCY MEDICINE

## 2025-05-06 PROCEDURE — 25000003 PHARM REV CODE 250: Performed by: EMERGENCY MEDICINE

## 2025-05-06 PROCEDURE — 85652 RBC SED RATE AUTOMATED: CPT | Performed by: NURSE PRACTITIONER

## 2025-05-06 PROCEDURE — 25000003 PHARM REV CODE 250: Performed by: NURSE PRACTITIONER

## 2025-05-06 PROCEDURE — 96375 TX/PRO/DX INJ NEW DRUG ADDON: CPT

## 2025-05-06 RX ORDER — TACROLIMUS 1 MG/1
3 CAPSULE ORAL 2 TIMES DAILY
Status: DISCONTINUED | OUTPATIENT
Start: 2025-05-06 | End: 2025-05-07

## 2025-05-06 RX ORDER — INSULIN ASPART 100 [IU]/ML
0-5 INJECTION, SOLUTION INTRAVENOUS; SUBCUTANEOUS
Status: DISCONTINUED | OUTPATIENT
Start: 2025-05-06 | End: 2025-05-13 | Stop reason: HOSPADM

## 2025-05-06 RX ORDER — POLYETHYLENE GLYCOL 3350 17 G/17G
17 POWDER, FOR SOLUTION ORAL DAILY
Status: DISCONTINUED | OUTPATIENT
Start: 2025-05-07 | End: 2025-05-09

## 2025-05-06 RX ORDER — PROCHLORPERAZINE EDISYLATE 5 MG/ML
5 INJECTION INTRAMUSCULAR; INTRAVENOUS EVERY 6 HOURS PRN
Status: DISCONTINUED | OUTPATIENT
Start: 2025-05-06 | End: 2025-05-09

## 2025-05-06 RX ORDER — CALCITRIOL 0.25 UG/1
0.25 CAPSULE ORAL DAILY
Status: DISCONTINUED | OUTPATIENT
Start: 2025-05-07 | End: 2025-05-10

## 2025-05-06 RX ORDER — SODIUM CHLORIDE 0.9 % (FLUSH) 0.9 %
10 SYRINGE (ML) INJECTION
Status: DISCONTINUED | OUTPATIENT
Start: 2025-05-06 | End: 2025-05-13 | Stop reason: HOSPADM

## 2025-05-06 RX ORDER — IBUPROFEN 200 MG
16 TABLET ORAL
Status: DISCONTINUED | OUTPATIENT
Start: 2025-05-06 | End: 2025-05-13 | Stop reason: HOSPADM

## 2025-05-06 RX ORDER — PREDNISONE 5 MG/1
5 TABLET ORAL DAILY
Status: DISCONTINUED | OUTPATIENT
Start: 2025-05-07 | End: 2025-05-13 | Stop reason: HOSPADM

## 2025-05-06 RX ORDER — MYCOPHENOLATE MOFETIL 250 MG/1
500 CAPSULE ORAL 2 TIMES DAILY
Status: DISCONTINUED | OUTPATIENT
Start: 2025-05-06 | End: 2025-05-13 | Stop reason: HOSPADM

## 2025-05-06 RX ORDER — AMOXICILLIN 250 MG
1 CAPSULE ORAL 2 TIMES DAILY
Status: DISCONTINUED | OUTPATIENT
Start: 2025-05-06 | End: 2025-05-09

## 2025-05-06 RX ORDER — FAMOTIDINE 20 MG/1
20 TABLET, FILM COATED ORAL EVERY OTHER DAY
Status: DISCONTINUED | OUTPATIENT
Start: 2025-05-07 | End: 2025-05-13 | Stop reason: HOSPADM

## 2025-05-06 RX ORDER — METOPROLOL SUCCINATE 25 MG/1
25 TABLET, EXTENDED RELEASE ORAL DAILY
Status: DISCONTINUED | OUTPATIENT
Start: 2025-05-07 | End: 2025-05-13 | Stop reason: HOSPADM

## 2025-05-06 RX ORDER — GLUCAGON 1 MG
1 KIT INJECTION
Status: DISCONTINUED | OUTPATIENT
Start: 2025-05-06 | End: 2025-05-13 | Stop reason: HOSPADM

## 2025-05-06 RX ORDER — MORPHINE SULFATE 4 MG/ML
4 INJECTION, SOLUTION INTRAMUSCULAR; INTRAVENOUS
Refills: 0 | Status: COMPLETED | OUTPATIENT
Start: 2025-05-06 | End: 2025-05-06

## 2025-05-06 RX ORDER — ONDANSETRON HYDROCHLORIDE 2 MG/ML
4 INJECTION, SOLUTION INTRAVENOUS
Status: COMPLETED | OUTPATIENT
Start: 2025-05-06 | End: 2025-05-06

## 2025-05-06 RX ORDER — ACETAMINOPHEN 325 MG/1
650 TABLET ORAL EVERY 8 HOURS PRN
Status: DISCONTINUED | OUTPATIENT
Start: 2025-05-06 | End: 2025-05-13 | Stop reason: HOSPADM

## 2025-05-06 RX ORDER — IBUPROFEN 200 MG
24 TABLET ORAL
Status: DISCONTINUED | OUTPATIENT
Start: 2025-05-06 | End: 2025-05-13 | Stop reason: HOSPADM

## 2025-05-06 RX ORDER — ONDANSETRON HYDROCHLORIDE 2 MG/ML
4 INJECTION, SOLUTION INTRAVENOUS EVERY 12 HOURS PRN
Status: DISCONTINUED | OUTPATIENT
Start: 2025-05-06 | End: 2025-05-11

## 2025-05-06 RX ORDER — ASPIRIN 81 MG/1
81 TABLET ORAL DAILY
Status: DISCONTINUED | OUTPATIENT
Start: 2025-05-07 | End: 2025-05-13 | Stop reason: HOSPADM

## 2025-05-06 RX ORDER — INSULIN GLARGINE 100 [IU]/ML
5 INJECTION, SOLUTION SUBCUTANEOUS 2 TIMES DAILY
Status: DISCONTINUED | OUTPATIENT
Start: 2025-05-06 | End: 2025-05-13 | Stop reason: HOSPADM

## 2025-05-06 RX ADMIN — INSULIN ASPART 1 UNITS: 100 INJECTION, SOLUTION INTRAVENOUS; SUBCUTANEOUS at 09:05

## 2025-05-06 RX ADMIN — SENNOSIDES AND DOCUSATE SODIUM 1 TABLET: 50; 8.6 TABLET ORAL at 08:05

## 2025-05-06 RX ADMIN — TACROLIMUS 3 MG: 1 CAPSULE ORAL at 07:05

## 2025-05-06 RX ADMIN — ONDANSETRON 4 MG: 2 INJECTION INTRAMUSCULAR; INTRAVENOUS at 01:05

## 2025-05-06 RX ADMIN — SODIUM CHLORIDE 500 ML: 9 INJECTION, SOLUTION INTRAVENOUS at 02:05

## 2025-05-06 RX ADMIN — INSULIN GLARGINE 5 UNITS: 100 INJECTION, SOLUTION SUBCUTANEOUS at 09:05

## 2025-05-06 RX ADMIN — FUROSEMIDE 5 MG/HR: 10 INJECTION, SOLUTION INTRAMUSCULAR; INTRAVENOUS at 04:05

## 2025-05-06 RX ADMIN — MYCOPHENOLATE MOFETIL 500 MG: 250 CAPSULE ORAL at 08:05

## 2025-05-06 RX ADMIN — APIXABAN 5 MG: 2.5 TABLET, FILM COATED ORAL at 08:05

## 2025-05-06 RX ADMIN — MORPHINE SULFATE 4 MG: 4 INJECTION INTRAVENOUS at 01:05

## 2025-05-06 NOTE — ASSESSMENT & PLAN NOTE
Presents with fluid overload, pulmonary edema  Reports dietary indiscretion with salty foods and excess water intake  Dietary advice was provided  We will need diuretics to to achieve symptomatic relief from dyspnea    Following is recommended:  Lasix 5-10 mg/hour IV infusion  Plus metolazone as needed 5-10 mg per dose  Moderate fluid restriction

## 2025-05-06 NOTE — ASSESSMENT & PLAN NOTE
History of living related kidney transplant from daughter in 2015  He is immunosuppressed on antirejection medications  Reordered the following:  Prograf 3 mg p.o. b.i.d.  Mycophenolate 500 mg p.o. b.i.d.  Prednisone 5 mg q.d.  Trough Prograf level was ordered for a.m.    Of note previously had leukopenia  Resolved after holding mycophenolate for a while  WBCs still is slightly low

## 2025-05-06 NOTE — ASSESSMENT & PLAN NOTE
Patients blood pressure range in the last 24 hours was: BP  Min: 111/65  Max: 147/73.The patient's inpatient anti-hypertensive regimen is listed below:  Current Antihypertensives  furosemide (Lasix) 200 mg in 0.9% NaCl SolP 100 mL continuous infusion (conc: 2 mg/mL), Continuous, Intravenous  metoprolol succinate (TOPROL-XL) 24 hr tablet 25 mg, Daily, Oral    Plan  - BP is controlled, no changes needed to their regimen

## 2025-05-06 NOTE — H&P
"  [facility] Medicine  History & Physical    Patient Name: Mitch Whittaker  MRN: [id number]  Patient Class: IP- Inpatient  Admission Date: 5/6/2025  Attending Physician: Ann Lazo MD   Primary Care Provider: Valery Caal MD         Patient information was obtained from patient and ER records.     Subjective:     Principal Problem:Acute on chronic combined systolic and diastolic congestive heart failure    Chief Complaint:   Chief Complaint   Patient presents with    Abdominal Pain     Pt brought in via EMS for left sided abdominal pain x 3 days, denies n/v, and diarrhea. Hx of kidney transplant in 2015.         HPI: The patient is a 70yo male with CAD, Combined CHF EF 35 - 40%, s/p Renal transplant 2015, Chronic immunosuppression, CKD3, DM, Anemia, HLD, HTN, Hx DVT- on Eliquis, Gout, Chronic venous stasis and recurrent skin infections, and Morbid obesity who presented to the ED with abdominal pain. Pt reports lower back pain radiating to his abdomen describes as an aching pain rated 8/10 that has occurred on/off for the last 3 days. Pt denies fever, chills, chest pain, SOB, cough, N/V, Diarrhea, or constipation. He reports a normal BM yesterday. Pt also complains of worsening weakness and pain to arms and legs which he attributs to gout. He reports pain is worse to his hands, left knee, and ankles. He reports his left knee has been swollen "for awhile". He reports chronic swelling to BLE. Pt is bed bound at home and lives with his wife and daughters who are his caretakers.   Pt reports abdominal pain has resolved since arrival to ED     In the ED, Afebrile, VSS, oxygenation normal. Labs revealed mild leukopenia with WBC 3.4, Hgb 7.3, BUN 79, sCr 5.7 (baseline 1.9-2.8), Albumin 1.5, , Trop 0.067>0.062. EKG showed sinus tachycardia -rate 108, PACs, nonspecific intraventricular block   CT chest/abd/pelvis showed Small left pleural effusion, Nonspecific distention of " the gallbladder, No gallbladder wall thickening or bile duct dilatation, Fatty liver, Native kidneys are small and atrophic, No hydronephrosis, No ureteral stones, Right renal transplant.    Ed provider discussed care with nephrologist, Dr. 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 RNA NEGATIVE 5/2015, 2021, 2022    History of colon polyps     HPTH (hyperparathyroidism)     Hyperlipidemia     Hypertension associated with stage 3 chronic kidney disease due to type 2 diabetes mellitus     LBBB (left bundle branch block) 12/20/2021    Morbid obesity with BMI of 45.0-49.9, adult     Nephrolithiasis 6/7/2013    PCO (posterior capsular opacification), left 03/04/2019    Proteinuria     resolved s/p transplant    S/P kidney transplant     Sleep apnea     Type 2 diabetes, uncontrolled, with retinopathy     Type II diabetes mellitus with renal manifestations        Past Surgical History:   Procedure Laterality Date    CARDIAC CATHETERIZATION  01/01/2008    normal coronary    CARPAL TUNNEL RELEASE Right 12/01/2023    Procedure: RELEASE, CARPAL TUNNEL;  Surgeon: Noel Almonte MD;  Location: [facility] OR;   Service: Orthopedics;  Laterality: Right;    CARPAL TUNNEL RELEASE Left 7/18/2024    Procedure: RELEASE, CARPAL TUNNEL;  Surgeon: Noel Almonte MD;  Location: [facility] OR;  Service: Orthopedics;  Laterality: Left;    COLONOSCOPY N/A 04/05/2018    Procedure: COLONOSCOPY;  Surgeon: Chava Ronquillo MD;  Location: [facility] ENDO;  Service: Endoscopy;  Laterality: N/A;    COLONOSCOPY N/A 05/02/2022    Procedure: COLONOSCOPY;  Surgeon: Alix Puente MD;  Location: [facility] ENDO;  Service: Endoscopy;  Laterality: N/A;    COLONOSCOPY N/A 06/07/2023    Procedure: COLONOSCOPY - rule out CMV  Cardiac clearance/Eliquis hold approval received on 05/21/23 per Dr. Meade, cardiology.  Note in encounters.  LB;  Surgeon: Daniella Shah MD;  Location: [facility] ENDO;  Service: Endoscopy;  Laterality: N/A;    ESOPHAGOGASTRODUODENOSCOPY N/A 03/26/2024    Procedure: EGD (ESOPHAGOGASTRODUODENOSCOPY) 3/1-pt cleared, ok to hold eliquis for 3 days;  Surgeon: Daniella Shah MD;  Location: [facility] ENDO;  Service: Endoscopy;  Laterality: N/A;    KIDNEY TRANSPLANT  2015    RETINAL LASER PROCEDURE         Review of patient's allergies indicates:   Allergen Reactions    Lisinopril Other (See Comments)     Other reaction(s):  cough    Actos  [pioglitazone] Other (See Comments)     Other reaction(s): CHF    Metformin Other (See Comments)     Other reaction(s): renal insuff  Other reaction(s): CHF       No current facility-administered medications on file prior to encounter.     Current Outpatient Medications on File Prior to Encounter   Medication Sig    albuterol (PROVENTIL) 2.5 mg /3 mL (0.083 %) nebulizer solution Take 3 mLs (2.5 mg total) by nebulization every 4 to 6 hours as needed for Wheezing or Shortness of Breath. (Patient not taking: Reported on 3/31/2025)    albuterol (PROVENTIL/VENTOLIN HFA) 90 mcg/actuation inhaler Inhale 2 puffs into the lungs every 4 (four) hours as needed for Wheezing or Shortness of Breath. (Patient not  "taking: Reported on 3/31/2025)    allopurinoL (ZYLOPRIM) 100 MG tablet Take ½ tablet (50 mg total) by mouth once daily.    apixaban (ELIQUIS) 5 mg Tab Take 1 tablet (5 mg total) by mouth 2 (two) times daily.    aspirin (ECOTRIN) 81 MG EC tablet Take 1 tablet by mouth Daily.     blood-glucose sensor (DEXCOM G7 SENSOR) Alba Use as directed for continuous glucose monitoring; Change every 10 days.    calcitRIOL (ROCALTROL) 0.25 MCG Cap Take 1 capsule (0.25 mcg total) by mouth once daily.    famotidine (PEPCID) 20 MG tablet TAKE ONE TABLET BY MOUTH EVERY EVENING    ferrous sulfate (FEROSUL) 325 mg (65 mg iron) Tab tablet Take 1 tablet (325 mg total) by mouth daily with breakfast.    fish oil-omega-3 fatty acids 300-1,000 mg capsule Take 1 g by mouth once daily.    furosemide (LASIX) 40 MG tablet Take 1 tablet (40 mg total) by mouth once daily.    HYDROcodone-acetaminophen (NORCO) 5-325 mg per tablet Take 1 tablet by mouth every 6 (six) hours as needed for Pain. (Patient not taking: Reported on 3/31/2025)    insulin aspart U-100 (NOVOLOG FLEXPEN U-100 INSULIN) 100 unit/mL (3 mL) InPn pen Inject 25 Units into the skin 3 (three) times daily with meals.    insulin aspart U-100 (NOVOLOG) 100 unit/mL (3 mL) InPn pen Inject 0-10 Units into the skin before meals and at bedtime as needed (Hyperglycemia). **MODERATE CORRECTION DOSE**  Blood Glucose  mg/dL                  Pre-meal                2200  151-200                2 units                    1 unit  201-250                4 units                    2 units  251-300                6 units                    3 units  301-350                8 units                    4 units  >350                     10 units                  5 units  Administer subcutaneously if needed at times designated by monitoring  schedule.  DO NOT HOLD correction dose insulin for patients who are  NPO.    "HIGH ALERT MEDICATION" - Administer with meals or TF/TPN.    insulin glargine U-100, Lantus, " (LANTUS SOLOSTAR U-100 INSULIN) 100 unit/mL (3 mL) InPn pen Inject 15 Units into the skin 2 (two) times daily.    ketoconazole (NIZORAL) 200 mg Tab Take ½ tablet (100 mg total) by mouth once daily.    metoprolol succinate (TOPROL-XL) 25 MG 24 hr tablet Take 1 tablet (25 mg total) by mouth once daily.    multivitamin (THERAGRAN) tablet Take 1 tablet by mouth once daily.    mycophenolate (CELLCEPT) 250 mg Cap Take 2 capsules (500 mg total) by mouth 2 (two) times daily.    potassium chloride SA (K-DUR,KLOR-CON) 20 MEQ tablet Take 1 tablet (20 mEq total) by mouth 2 (two) times daily.    predniSONE (DELTASONE) 5 MG tablet Take 1 tablet (5 mg total) by mouth once daily.    rosuvastatin (CRESTOR) 40 MG Tab Take 1 tablet (40 mg total) by mouth once daily.    semaglutide (OZEMPIC) 2 mg/dose (8 mg/3 mL) PnIj Inject 2 mg into the skin every 7 days.    sodium bicarbonate 650 MG tablet Take 1 tablet (650 mg total) by mouth once daily.    tacrolimus (PROGRAF) 1 MG Cap Take 3 capsules (3 mg total) by mouth every morning AND 3 capsules (3 mg total) every evening. Until followup with nephrology.    triamcinolone acetonide 0.1% (KENALOG) 0.1 % ointment Apply topically 2 (two) times daily. Use on bilateral lower legs.     Family History       Problem Relation (Age of Onset)    Diabetes Mother, Sister, Maternal Grandmother    Heart failure Mother, Father    Hypertension Mother    Kidney disease Sister          Tobacco Use    Smoking status: Former     Current packs/day: 0.00     Types: Cigarettes     Quit date: 2013     Years since quittin.9     Passive exposure: Past    Smokeless tobacco: Former     Quit date: 2013    Tobacco comments:     used marijuana since [id number], stopped after started dialysis   Substance and Sexual Activity    Alcohol use: No     Alcohol/week: 0.0 standard drinks of alcohol    Drug use: Not Currently     Comment:      Sexual activity: Never     Review of Systems   Constitutional:  Positive for  activity change and fatigue.   Respiratory:  Negative for shortness of breath.    Cardiovascular:  Positive for leg swelling (chronic). Negative for chest pain.   Gastrointestinal:  Positive for abdominal pain (mid to upper abdominal/epigastric pain).   Musculoskeletal:  Positive for arthralgias (arms/legs).   Allergic/Immunologic: Positive for immunocompromised state.   Neurological:  Positive for weakness (generalized- bed bound status).   All other systems reviewed and are negative.    Objective:     Vital Signs (Most Recent):  Temp: 98.4 °F (36.9 °C) (05/06/25 1700)  Pulse: 85 (05/06/25 1700)  Resp: 18 (05/06/25 1700)  BP: 139/74 (05/06/25 1700)  SpO2: 100 % (05/06/25 1700) Vital Signs (24h Range):  Temp:  [98.4 °F (36.9 °C)-98.5 °F (36.9 °C)] 98.4 °F (36.9 °C)  Pulse:  [] 85  Resp:  [15-18] 18  SpO2:  [97 %-100 %] 100 %  BP: (111-147)/(61-74) 139/74     Weight: 105.9 kg (233 lb 8 oz)  Body mass index is 31.67 kg/m².     Physical Exam  Vitals and nursing note reviewed.   Constitutional:       General: He is not in acute distress.     Appearance: He is well-developed. He is not diaphoretic.   HENT:      Head: Normocephalic and atraumatic.      Nose: Nose normal.   Eyes:      General: No scleral icterus.     Conjunctiva/sclera: Conjunctivae normal.   Neck:      Vascular: JVD present.   Cardiovascular:      Rate and Rhythm: Normal rate and regular rhythm.      Heart sounds: Normal heart sounds. No murmur heard.     No friction rub. No gallop.   Pulmonary:      Effort: Pulmonary effort is normal. No respiratory distress.      Breath sounds: Normal breath sounds. No stridor. No wheezing or rales.   Chest:      Chest wall: No tenderness.   Abdominal:      General: Bowel sounds are normal. There is no distension.      Palpations: Abdomen is soft.      Tenderness: There is no abdominal tenderness. There is no guarding or rebound.   Musculoskeletal:         General: Swelling (left knee), tenderness (hands, elbows,  left knee, feet) and deformity (left knee) present.      Cervical back: Normal range of motion and neck supple.      Right lower leg: Edema (trace) present.      Left lower leg: Edema (trace) present.   Skin:     General: Skin is warm and dry.      Coloration: Skin is not pale.      Findings: No erythema or rash.   Chronic venous stasis to BLE with healed ulcers   Neurological:      General: No focal deficit present.      Mental Status: He is alert and oriented to person, place, and time.      Motor: Weakness (MS 2/5 to BUE/BLE) and atrophy (muscle atrophy BLE) present.   Psychiatric:         Behavior: Behavior normal.         Thought Content: Thought content normal.                Significant Labs: All pertinent labs within the past 24 hours have been reviewed.    Significant Imaging: I have reviewed all pertinent imaging results/findings within the past 24 hours.  Assessment/Plan:     Assessment & Plan  Acute on chronic combined systolic and diastolic congestive heart failure  Patient has Combined Systolic and Diastolic heart failure that is Acute on chronic. On presentation their CHF was decompensated. Evidence of decompensated CHF on presentation includes: edema, elevated JVD, and worsening renal function. The etiology of their decompensation is likely dietary indiscretion and increased fluid intake. Most recent BNP and echo results are listed below.  Recent Labs     05/06/25  1328   *     Latest ECHO  Results for orders placed during the hospital encounter of 03/13/25    Echo Saline Bubble? No    Interpretation Summary    Left Ventricle: The left ventricle is normal in size. Mildly increased ventricular mass. Mildly increased wall thickness. There is mild concentric hypertrophy. Normal wall motion. Septal motion is consistent with bundle branch block. There is moderately reduced systolic function with a visually estimated ejection fraction of 35 - 40%. Grade I diastolic dysfunction.    Right Ventricle:  The right ventricle is normal in size. Wall thickness is normal. Systolic function is normal.    Left Atrium: Mildly dilated    Aorta: Aortic annulus is mildly dilated measuring 4.01 cm. Ascending aorta is normal measuring 3.69 cm.    Pulmonary Artery: The estimated pulmonary artery systolic pressure is 24 mmHg.    IVC/SVC: Normal venous pressure at 3 mmHg.    Current Heart Failure Medications  furosemide (Lasix) 200 mg in 0.9% NaCl SolP 100 mL continuous infusion (conc: 2 mg/mL), Continuous, Intravenous  metoprolol succinate (TOPROL-XL) 24 hr tablet 25 mg, Daily, Oral    Plan  - Monitor strict I&Os and daily weights.    - Place on telemetry  - Low sodium diet  - Place on fluid restriction of 1.5 L.   - Cardiology has not been consulted  - The patient's volume status is worsening as indicated by edema and elevated JVD. Will continue current treatment  - Nephrology recommended lasix drip   CHF pathway initiated       ANGELITO on CKD 4  ANGELITO is likely due to CHF Baseline creatinine is 1.9-2.8. Most recent creatinine and eGFR are listed below.  Recent Labs     05/06/25  1312   CREATININE 5.7*   EGFRNORACEVR 10*      Plan  - ANGELITO is worsening. Will continue current treatment  - Avoid nephrotoxins and renally dose meds for GFR listed above  - Monitor urine output, serial BMP, and adjust therapy as needed  - nephrology recommended Lasix drip   Check UA, Uric acid, Urine sodium/cr, and urine urea nitrogen   Obstructive sleep apnea  CPAP hs    Coronary artery disease of native artery of native heart with stable angina pectoris  Patient with known non obstructive CAD , which is controlled Will continue BB, ASA, and Statin and monitor for S/Sx of angina/ACS. Continue to monitor on telemetry.   Living-related donor kidney transplant (daughter) - 6/12/15  Nephrology following   Continue Prograf, Prednisone, Cellcept     Secondary hyperparathyroidism of renal origin  Stable   Cont Calcitriol     Type II diabetes mellitus with renal  "manifestations  Patient's FSGs are controlled on current medication regimen.  Last A1c reviewed-   Lab Results   Component Value Date    HGBA1C 5.7 (H) 12/17/2024     Most recent fingerstick glucose reviewed- No results for input(s): "POCTGLUCOSE" in the last 24 hours.  Current correctional scale  Low  Maintain anti-hyperglycemic dose as follows-   Antihyperglycemics (From admission, onward)      Start     Stop Route Frequency Ordered    05/06/25 2100  insulin glargine U-100 (Lantus) pen 5 Units         -- SubQ 2 times daily 05/06/25 1644    05/06/25 1813  insulin aspart U-100 pen 0-5 Units         -- SubQ Before meals & nightly PRN 05/06/25 1713          Hold Oral hypoglycemics while patient is in the hospital.    Cont Lantus- titrate   Hypertension associated with stage 3 chronic kidney disease due to type 2 diabetes mellitus  Patients blood pressure range in the last 24 hours was: BP  Min: 111/65  Max: 147/73.The patient's inpatient anti-hypertensive regimen is listed below:  Current Antihypertensives  furosemide (Lasix) 200 mg in 0.9% NaCl SolP 100 mL continuous infusion (conc: 2 mg/mL), Continuous, Intravenous  metoprolol succinate (TOPROL-XL) 24 hr tablet 25 mg, Daily, Oral    Plan  - BP is controlled, no changes needed to their regimen     Debility  Patient with Chronic debility due to functional quadraplegia, bed confinement status, and chronic unspecified fatigue. The patient's latest AMPAC (Activity Measure for Post Acute Care) Score is listed below.    AM-PAC Score - How much help does the patient need for each activity listed       Plan  - Progressive mobility protocol initated  - Fall precautions in place          Gout  Check uric acid, cont Allopurinol     History of DVT (deep vein thrombosis)  Stable   Cont Eliquis     Immunosuppression  Nephrology recommended continuing pt's home medications Prograf, Cellcept, and Prednisone     Arthralgia  Check Uric Acid, ESR, CRP    VTE Risk Mitigation (From " admission, onward)           Ordered     apixaban tablet 5 mg  2 times daily         05/06/25 1644     IP VTE HIGH RISK PATIENT  Once         05/06/25 1644     Place sequential compression device  Until discontinued         05/06/25 1644                               Pharmacist Renal Dose Adjustment Note    Mitch Whittaker is a 69 y.o. male being treated with the medication famotidine.     Patient Data:    Vital Signs (Most Recent):  Temp: 98.4 °F (36.9 °C) (05/06/25 1355)  Pulse: 87 (05/06/25 1635)  Resp: 18 (05/06/25 1635)  BP: (!) 141/72 (05/06/25 1635)  SpO2: 99 % (05/06/25 1635) Vital Signs (72h Range):  Temp:  [98.4 °F (36.9 °C)-98.5 °F (36.9 °C)]   Pulse:  []   Resp:  [15-18]   BP: (111-147)/(61-74)   SpO2:  [97 %-100 %]      Recent Labs   Lab 04/30/25  1400 05/06/25  1312   CREATININE 2.8* 5.7*     Serum creatinine: 5.7 mg/dL (H) 05/06/25 1312  Estimated creatinine clearance: 14.9 mL/min (A)    Famotidine 20 mg oral daily will be changed to famotidine 20 mg oral every 48 hours per renal dose protocol for CrCl < 30 ml/min.     Thank you,  Pharmacist's Name: Javy Balbuena NP  Department of Hospital Medicine  [facility] - Telemetry ([facility])

## 2025-05-06 NOTE — ED NOTES
Bed: 16  Expected date:   Expected time:   Means of arrival: Ambulance Service  Comments:  When clean

## 2025-05-06 NOTE — ASSESSMENT & PLAN NOTE
Patient with known non obstructive CAD , which is controlled Will continue BB, ASA, and Statin and monitor for S/Sx of angina/ACS. Continue to monitor on telemetry.

## 2025-05-06 NOTE — ASSESSMENT & PLAN NOTE
Patient has Combined Systolic and Diastolic heart failure that is Acute on chronic. On presentation their CHF was decompensated. Evidence of decompensated CHF on presentation includes: edema, elevated JVD, and worsening renal function. The etiology of their decompensation is likely dietary indiscretion and increased fluid intake. Most recent BNP and echo results are listed below.  Recent Labs     05/06/25  1328   *     Latest ECHO  Results for orders placed during the hospital encounter of 03/13/25    Echo Saline Bubble? No    Interpretation Summary    Left Ventricle: The left ventricle is normal in size. Mildly increased ventricular mass. Mildly increased wall thickness. There is mild concentric hypertrophy. Normal wall motion. Septal motion is consistent with bundle branch block. There is moderately reduced systolic function with a visually estimated ejection fraction of 35 - 40%. Grade I diastolic dysfunction.    Right Ventricle: The right ventricle is normal in size. Wall thickness is normal. Systolic function is normal.    Left Atrium: Mildly dilated    Aorta: Aortic annulus is mildly dilated measuring 4.01 cm. Ascending aorta is normal measuring 3.69 cm.    Pulmonary Artery: The estimated pulmonary artery systolic pressure is 24 mmHg.    IVC/SVC: Normal venous pressure at 3 mmHg.    Current Heart Failure Medications  furosemide (Lasix) 200 mg in 0.9% NaCl SolP 100 mL continuous infusion (conc: 2 mg/mL), Continuous, Intravenous  metoprolol succinate (TOPROL-XL) 24 hr tablet 25 mg, Daily, Oral    Plan  - Monitor strict I&Os and daily weights.    - Place on telemetry  - Low sodium diet  - Place on fluid restriction of 1.5 L.   - Cardiology has not been consulted  - The patient's volume status is worsening as indicated by edema and elevated JVD. Will continue current treatment  - Nephrology recommended lasix drip   CHF pathway initiated

## 2025-05-06 NOTE — CONSULTS
[facility] - Emergency Dept.  Nephrology  Consult Note      Patient Name: Mitch Whittaker  MRN: [id number]  Admission Date: 5/6/2025  Hospital Length of Stay: 0 days  Attending Provider: Ann Lazo MD   Primary Care Physician: Valery Caal MD  Principal Problem:  Dyspnea    Reason for consultation:  ANGELITO on CKD.  Fluid overload kidney transplant patient    Consults  Subjective:     HPI: Thank you for referring the pt to us. H/o and chart were reviewed. Pt was seen and examined.  Patient is a 69-year-old male with a history of living related kidney transplant from his daughter in 2015, CKD stage 3 at baseline, DM-2, HTN, combined systolic (EF 40%) and diastolic CHF, and obesity who presented to ER with chest discomfort and shortness of breath.  Patient had seen me virtually as part of renal follow-up visit just 5 days ago.  He expressed some discomfort and SOB then but not to this extent.  He admits that he is worse now.  He has been drinking a lot of water and other liquids.  He says he is not restricting salt in his diet.  He has no other complaints.  No fever, no cough, no chest pain on exertion, just a feeling of fullness in his chest, he feels his legs are weaker than before, and he denies any abdominal pain or discomfort at the graft site.  He says he has been compliant with his medications.  He does not know his medicines.  His daughter normally dispenses them to him.      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 RNA NEGATIVE 5/2015, 2021, 2022    History of colon polyps     HPTH (hyperparathyroidism)     Hyperlipidemia     Hypertension associated with stage 3 chronic kidney disease due to type 2 diabetes mellitus     LBBB (left bundle branch block) 12/20/2021    Morbid obesity with BMI of 45.0-49.9, adult     Nephrolithiasis 6/7/2013    PCO (posterior capsular opacification), left 03/04/2019    Proteinuria     resolved s/p transplant    S/P kidney transplant     Sleep apnea     Type 2 diabetes, uncontrolled, with retinopathy     Type II diabetes mellitus with renal manifestations        Past Surgical History:   Procedure Laterality Date    CARDIAC CATHETERIZATION  01/01/2008    normal coronary    CARPAL TUNNEL RELEASE Right 12/01/2023    Procedure: RELEASE, CARPAL TUNNEL;  Surgeon: Noel Almonte MD;  Location: [facility] OR;  Service: Orthopedics;  Laterality: Right;    CARPAL TUNNEL RELEASE Left 7/18/2024    Procedure: RELEASE, CARPAL TUNNEL;  Surgeon: Noel Almonte MD;  Location: [facility] OR;  Service: Orthopedics;  Laterality: Left;    COLONOSCOPY N/A 04/05/2018    Procedure: COLONOSCOPY;  Surgeon: Chava Ronquillo MD;  Location: [facility] ENDO;  Service: Endoscopy;  Laterality: N/A;    COLONOSCOPY N/A 05/02/2022    Procedure: COLONOSCOPY;  Surgeon: Alix Puente MD;  Location: [facility];  Service: Endoscopy;  Laterality: N/A;    COLONOSCOPY N/A 06/07/2023    Procedure: COLONOSCOPY - rule out CMV  Cardiac clearance/Eliquis hold approval received on 05/21/23 per Dr. Meade, cardiology.  Note in encounters.  LB;  Surgeon: Daniella Shah MD;  Location: [facility] ENDO;  Service: Endoscopy;  Laterality: N/A;    ESOPHAGOGASTRODUODENOSCOPY N/A 03/26/2024    Procedure: EGD  (ESOPHAGOGASTRODUODENOSCOPY) 3/1-pt cleared, ok to hold eliquis for 3 days;  Surgeon: Daniella Shah MD;  Location: [facility];  Service: Endoscopy;  Laterality: N/A;    KIDNEY TRANSPLANT  2015    RETINAL LASER PROCEDURE         Review of patient's allergies indicates:   Allergen Reactions    Lisinopril Other (See Comments)     Other reaction(s):  cough    Actos  [pioglitazone] Other (See Comments)     Other reaction(s): CHF    Metformin Other (See Comments)     Other reaction(s): renal insuff  Other reaction(s): CHF     Current Facility-Administered Medications   Medication Frequency    acetaminophen tablet 650 mg Once PRN    furosemide (Lasix) 200 mg in 0.9% NaCl SolP 100 mL continuous infusion (conc: 2 mg/mL) Continuous    mycophenolate capsule 500 mg BID    [START ON 5/7/2025] predniSONE tablet 5 mg Daily    tacrolimus capsule 3 mg BID     Current Outpatient Medications   Medication    albuterol (PROVENTIL) 2.5 mg /3 mL (0.083 %) nebulizer solution    albuterol (PROVENTIL/VENTOLIN HFA) 90 mcg/actuation inhaler    allopurinoL (ZYLOPRIM) 100 MG tablet    apixaban (ELIQUIS) 5 mg Tab    aspirin (ECOTRIN) 81 MG EC tablet    blood-glucose sensor (DEXCOM G7 SENSOR) Alba    calcitRIOL (ROCALTROL) 0.25 MCG Cap    famotidine (PEPCID) 20 MG tablet    ferrous sulfate (FEROSUL) 325 mg (65 mg iron) Tab tablet    fish oil-omega-3 fatty acids 300-1,000 mg capsule    furosemide (LASIX) 40 MG tablet    HYDROcodone-acetaminophen (NORCO) 5-325 mg per tablet    insulin aspart U-100 (NOVOLOG FLEXPEN U-100 INSULIN) 100 unit/mL (3 mL) InPn pen    insulin aspart U-100 (NOVOLOG) 100 unit/mL (3 mL) InPn pen    insulin glargine U-100, Lantus, (LANTUS SOLOSTAR U-100 INSULIN) 100 unit/mL (3 mL) InPn pen    ketoconazole (NIZORAL) 200 mg Tab    metoprolol succinate (TOPROL-XL) 25 MG 24 hr tablet    multivitamin (THERAGRAN) tablet    mycophenolate (CELLCEPT) 250 mg Cap    potassium chloride SA (K-DUR,KLOR-CON) 20 MEQ tablet    predniSONE  (DELTASONE) 5 MG tablet    rosuvastatin (CRESTOR) 40 MG Tab    semaglutide (OZEMPIC) 2 mg/dose (8 mg/3 mL) PnIj    sodium bicarbonate 650 MG tablet    tacrolimus (PROGRAF) 1 MG Cap    triamcinolone acetonide 0.1% (KENALOG) 0.1 % ointment     Family History       Problem Relation (Age of Onset)    Diabetes Mother, Sister, Maternal Grandmother    Heart failure Mother, Father    Hypertension Mother    Kidney disease Sister          Tobacco Use    Smoking status: Former     Current packs/day: 0.00     Types: Cigarettes     Quit date: 2013     Years since quittin.9     Passive exposure: Past    Smokeless tobacco: Former     Quit date: 2013    Tobacco comments:     used marijuana since [id number], stopped after started dialysis   Substance and Sexual Activity    Alcohol use: No     Alcohol/week: 0.0 standard drinks of alcohol    Drug use: Not Currently     Comment:      Sexual activity: Never     Review of Systems   Constitutional: Negative.    HENT: Negative.     Respiratory:  Positive for shortness of breath. Negative for cough.    Cardiovascular:  Positive for leg swelling. Negative for chest pain.   Gastrointestinal: Negative.    Neurological: Negative.    Psychiatric/Behavioral: Negative.       Objective:     Vital Signs (Most Recent):  Temp: 98.4 °F (36.9 °C) (25 1355)  Pulse: 90 (25 1605)  Resp: 18 (25 1605)  BP: (!) 142/72 (25 1605)  SpO2: 100 % (25 1605) Vital Signs (24h Range):  Temp:  [98.4 °F (36.9 °C)-98.5 °F (36.9 °C)] 98.4 °F (36.9 °C)  Pulse:  [] 90  Resp:  [15-18] 18  SpO2:  [97 %-100 %] 100 %  BP: (111-147)/(61-74) 142/72        There is no height or weight on file to calculate BMI.  There is no height or weight on file to calculate BSA.    No intake/output data recorded.     Physical Exam  Vitals and nursing note reviewed.   Constitutional:       Appearance: Normal appearance.   HENT:      Head: Normocephalic and atraumatic.   Cardiovascular:      Rate  and Rhythm: Normal rate and regular rhythm.      Pulses: Normal pulses.      Heart sounds: Normal heart sounds.   Pulmonary:      Effort: Pulmonary effort is normal.      Breath sounds: Rales present.   Abdominal:      Palpations: Abdomen is soft.      Tenderness: There is no guarding.   Musculoskeletal:      Right lower leg: Edema present.      Left lower leg: Edema present.   Neurological:      Mental Status: He is alert and oriented to person, place, and time.   Psychiatric:         Behavior: Behavior normal.          Significant Labs:  Reviewed  BMP  Lab Results   Component Value Date     05/06/2025    K 4.9 05/06/2025    CL 98 05/06/2025    CO2 24 05/06/2025    BUN 79 (H) 05/06/2025    CREATININE 5.7 (H) 05/06/2025    CALCIUM 9.2 05/06/2025    ANIONGAP 14 05/06/2025    EGFRNORACEVR 10 (L) 05/06/2025     Lab Results   Component Value Date    WBC 3.40 (L) 05/06/2025    HGB 7.3 (L) 05/06/2025    HCT 25.1 (L) 05/06/2025    MCV 81 (L) 05/06/2025     05/06/2025       Recent Labs   Lab 05/06/25  1312   TROPONINI 0.067*       Significant Imaging:  Reviewed chest x-ray:  Has pulmonary edema, cardiomegaly      Assessment/Plan:     69-year-old male with history of kidney transplant presented with volume overload:    ANGELITO on CKD 4  Patient has ANGELITO on CKD stage 3 or 4.  Baseline stage difficult to say because of s Cr fluctuations  ANGELITO likely related to CHF exacerbation  Prerenal azotemia  History of DM and hypertension  Was on HD x 2.5 years before the transplant  K normal  Na normal    Currently patient is fluid overloaded and is experiencing dyspnea  Okay to give diuretics but the dose has to be proportional to the amount of fluid gain  Strict I's and O's  Dietitian consult will be helpful    Immunosuppression  History of living related kidney transplant from daughter in 2015  He is immunosuppressed on antirejection medications  Reordered the following:  Prograf 3 mg p.o. b.i.d.  Mycophenolate 500 mg p.o.  b.i.d.  Prednisone 5 mg q.d.  Trough Prograf level was ordered for a.m.    Of note previously had leukopenia  Resolved after holding mycophenolate for a while  WBCs still is slightly low    Chronic combined systolic and diastolic congestive heart failure  Presents with fluid overload, pulmonary edema  Reports dietary indiscretion with salty foods and excess water intake  Dietary advice was provided  We will need diuretics to to achieve symptomatic relief from dyspnea    Following is recommended:  Lasix 5-10 mg/hour IV infusion  Plus metolazone as needed 5-10 mg per dose  Moderate fluid restriction        Plans and recommendations:  As detailed above  Total time spent 70 minutes including time needed to review the records, the   patient evaluation, documentation, face-to-face discussion with the patient,   more than 50% of the time was spent on coordination of care and counseling.    Level V visit.        Thank you for your consult.     Hany Gonsalez MD   Nephrology  [facility] - Emergency Dept.

## 2025-05-06 NOTE — PROGRESS NOTES
Pharmacist Renal Dose Adjustment Note    Mitch Whittaker is a 69 y.o. male being treated with the medication famotidine.     Patient Data:    Vital Signs (Most Recent):  Temp: 98.4 °F (36.9 °C) (05/06/25 1355)  Pulse: 87 (05/06/25 1635)  Resp: 18 (05/06/25 1635)  BP: (!) 141/72 (05/06/25 1635)  SpO2: 99 % (05/06/25 1635) Vital Signs (72h Range):  Temp:  [98.4 °F (36.9 °C)-98.5 °F (36.9 °C)]   Pulse:  []   Resp:  [15-18]   BP: (111-147)/(61-74)   SpO2:  [97 %-100 %]      Recent Labs   Lab 04/30/25  1400 05/06/25  1312   CREATININE 2.8* 5.7*     Serum creatinine: 5.7 mg/dL (H) 05/06/25 1312  Estimated creatinine clearance: 14.9 mL/min (A)    Famotidine 20 mg oral daily will be changed to famotidine 20 mg oral every 48 hours per renal dose protocol for CrCl < 30 ml/min.     Thank you,  Pharmacist's Name: Javy Dewitt

## 2025-05-06 NOTE — HPI
Thank you for referring the pt to us. H/o and chart were reviewed. Pt was seen and examined.  Patient is a 69-year-old male with a history of living related kidney transplant from his daughter in 2015, CKD stage 3 at baseline, DM-2, HTN, combined systolic (EF 40%) and diastolic CHF, and obesity who presented to ER with chest discomfort and shortness of breath.  Patient had seen me virtually as part of renal follow-up visit just 5 days ago.  He expressed some discomfort and SOB then but not to this extent.  He admits that he is worse now.  He has been drinking a lot of water and other liquids.  He says he is not restricting salt in his diet.  He has no other complaints.  No fever, no cough, no chest pain on exertion, just a feeling of fullness in his chest, he feels his legs are weaker than before, and he denies any abdominal pain or discomfort at the graft site.  He says he has been compliant with his medications.  He does not know his medicines.  His daughter normally dispenses them to him.

## 2025-05-06 NOTE — ASSESSMENT & PLAN NOTE
ANGELITO is likely due to CHF Baseline creatinine is 1.9-2.8. Most recent creatinine and eGFR are listed below.  Recent Labs     05/06/25  1312   CREATININE 5.7*   EGFRNORACEVR 10*      Plan  - ANGELITO is worsening. Will continue current treatment  - Avoid nephrotoxins and renally dose meds for GFR listed above  - Monitor urine output, serial BMP, and adjust therapy as needed  - nephrology recommended Lasix drip   Check UA, Uric acid, Urine sodium/cr, and urine urea nitrogen

## 2025-05-06 NOTE — SUBJECTIVE & OBJECTIVE
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 RNA NEGATIVE 5/2015, 2021, 2022    History of colon polyps     HPTH (hyperparathyroidism)     Hyperlipidemia     Hypertension associated with stage 3 chronic kidney disease due to type 2 diabetes mellitus     LBBB (left bundle branch block) 12/20/2021    Morbid obesity with BMI of 45.0-49.9, adult     Nephrolithiasis 6/7/2013    PCO (posterior capsular opacification), left 03/04/2019    Proteinuria     resolved s/p transplant    S/P kidney transplant     Sleep apnea     Type 2 diabetes, uncontrolled, with retinopathy     Type II diabetes mellitus with renal manifestations        Past Surgical History:   Procedure Laterality Date    CARDIAC CATHETERIZATION  01/01/2008    normal coronary    CARPAL TUNNEL RELEASE Right 12/01/2023    Procedure: RELEASE, CARPAL TUNNEL;  Surgeon: Noel Almonte MD;  Location: [facility] OR;  Service: Orthopedics;  Laterality: Right;    CARPAL TUNNEL RELEASE Left 7/18/2024    Procedure: RELEASE, CARPAL TUNNEL;  Surgeon: Noel Almonte MD;  Location: [facility] OR;  Service: Orthopedics;  Laterality: Left;    COLONOSCOPY N/A 04/05/2018    Procedure: COLONOSCOPY;  Surgeon: Chava Ronquillo MD;  Location: [facility] ENDO;   Service: Endoscopy;  Laterality: N/A;    COLONOSCOPY N/A 05/02/2022    Procedure: COLONOSCOPY;  Surgeon: Alix Puente MD;  Location: [facility];  Service: Endoscopy;  Laterality: N/A;    COLONOSCOPY N/A 06/07/2023    Procedure: COLONOSCOPY - rule out CMV  Cardiac clearance/Eliquis hold approval received on 05/21/23 per Dr. Meade, cardiology.  Note in encounters.  LB;  Surgeon: Daniella Shah MD;  Location: [facility];  Service: Endoscopy;  Laterality: N/A;    ESOPHAGOGASTRODUODENOSCOPY N/A 03/26/2024    Procedure: EGD (ESOPHAGOGASTRODUODENOSCOPY) 3/1-pt cleared, ok to hold eliquis for 3 days;  Surgeon: Daniella Shah MD;  Location: [facility];  Service: Endoscopy;  Laterality: N/A;    KIDNEY TRANSPLANT  2015    RETINAL LASER PROCEDURE         Review of patient's allergies indicates:   Allergen Reactions    Lisinopril Other (See Comments)     Other reaction(s):  cough    Actos  [pioglitazone] Other (See Comments)     Other reaction(s): CHF    Metformin Other (See Comments)     Other reaction(s): renal insuff  Other reaction(s): CHF       No current facility-administered medications on file prior to encounter.     Current Outpatient Medications on File Prior to Encounter   Medication Sig    albuterol (PROVENTIL) 2.5 mg /3 mL (0.083 %) nebulizer solution Take 3 mLs (2.5 mg total) by nebulization every 4 to 6 hours as needed for Wheezing or Shortness of Breath. (Patient not taking: Reported on 3/31/2025)    albuterol (PROVENTIL/VENTOLIN HFA) 90 mcg/actuation inhaler Inhale 2 puffs into the lungs every 4 (four) hours as needed for Wheezing or Shortness of Breath. (Patient not taking: Reported on 3/31/2025)    allopurinoL (ZYLOPRIM) 100 MG tablet Take ½ tablet (50 mg total) by mouth once daily.    apixaban (ELIQUIS) 5 mg Tab Take 1 tablet (5 mg total) by mouth 2 (two) times daily.    aspirin (ECOTRIN) 81 MG EC tablet Take 1 tablet by mouth Daily.     blood-glucose sensor (DEXCOM G7 SENSOR) Alba Use as  "directed for continuous glucose monitoring; Change every 10 days.    calcitRIOL (ROCALTROL) 0.25 MCG Cap Take 1 capsule (0.25 mcg total) by mouth once daily.    famotidine (PEPCID) 20 MG tablet TAKE ONE TABLET BY MOUTH EVERY EVENING    ferrous sulfate (FEROSUL) 325 mg (65 mg iron) Tab tablet Take 1 tablet (325 mg total) by mouth daily with breakfast.    fish oil-omega-3 fatty acids 300-1,000 mg capsule Take 1 g by mouth once daily.    furosemide (LASIX) 40 MG tablet Take 1 tablet (40 mg total) by mouth once daily.    HYDROcodone-acetaminophen (NORCO) 5-325 mg per tablet Take 1 tablet by mouth every 6 (six) hours as needed for Pain. (Patient not taking: Reported on 3/31/2025)    insulin aspart U-100 (NOVOLOG FLEXPEN U-100 INSULIN) 100 unit/mL (3 mL) InPn pen Inject 25 Units into the skin 3 (three) times daily with meals.    insulin aspart U-100 (NOVOLOG) 100 unit/mL (3 mL) InPn pen Inject 0-10 Units into the skin before meals and at bedtime as needed (Hyperglycemia). **MODERATE CORRECTION DOSE**  Blood Glucose  mg/dL                  Pre-meal                2200  151-200                2 units                    1 unit  201-250                4 units                    2 units  251-300                6 units                    3 units  301-350                8 units                    4 units  >350                     10 units                  5 units  Administer subcutaneously if needed at times designated by monitoring  schedule.  DO NOT HOLD correction dose insulin for patients who are  NPO.    "HIGH ALERT MEDICATION" - Administer with meals or TF/TPN.    insulin glargine U-100, Lantus, (LANTUS SOLOSTAR U-100 INSULIN) 100 unit/mL (3 mL) InPn pen Inject 15 Units into the skin 2 (two) times daily.    ketoconazole (NIZORAL) 200 mg Tab Take ½ tablet (100 mg total) by mouth once daily.    metoprolol succinate (TOPROL-XL) 25 MG 24 hr tablet Take 1 tablet (25 mg total) by mouth once daily.    multivitamin (THERAGRAN) " tablet Take 1 tablet by mouth once daily.    mycophenolate (CELLCEPT) 250 mg Cap Take 2 capsules (500 mg total) by mouth 2 (two) times daily.    potassium chloride SA (K-DUR,KLOR-CON) 20 MEQ tablet Take 1 tablet (20 mEq total) by mouth 2 (two) times daily.    predniSONE (DELTASONE) 5 MG tablet Take 1 tablet (5 mg total) by mouth once daily.    rosuvastatin (CRESTOR) 40 MG Tab Take 1 tablet (40 mg total) by mouth once daily.    semaglutide (OZEMPIC) 2 mg/dose (8 mg/3 mL) PnIj Inject 2 mg into the skin every 7 days.    sodium bicarbonate 650 MG tablet Take 1 tablet (650 mg total) by mouth once daily.    tacrolimus (PROGRAF) 1 MG Cap Take 3 capsules (3 mg total) by mouth every morning AND 3 capsules (3 mg total) every evening. Until followup with nephrology.    triamcinolone acetonide 0.1% (KENALOG) 0.1 % ointment Apply topically 2 (two) times daily. Use on bilateral lower legs.     Family History       Problem Relation (Age of Onset)    Diabetes Mother, Sister, Maternal Grandmother    Heart failure Mother, Father    Hypertension Mother    Kidney disease Sister          Tobacco Use    Smoking status: Former     Current packs/day: 0.00     Types: Cigarettes     Quit date: 2013     Years since quittin.9     Passive exposure: Past    Smokeless tobacco: Former     Quit date: 2013    Tobacco comments:     used marijuana since [id number], stopped after started dialysis   Substance and Sexual Activity    Alcohol use: No     Alcohol/week: 0.0 standard drinks of alcohol    Drug use: Not Currently     Comment:      Sexual activity: Never     Review of Systems   Constitutional:  Positive for activity change and fatigue.   Respiratory:  Negative for shortness of breath.    Cardiovascular:  Positive for leg swelling (chronic). Negative for chest pain.   Gastrointestinal:  Positive for abdominal pain (mid to upper abdominal/epigastric pain).   Musculoskeletal:  Positive for arthralgias (arms/legs).    Allergic/Immunologic: Positive for immunocompromised state.   Neurological:  Positive for weakness (generalized- bed bound status).   All other systems reviewed and are negative.    Objective:     Vital Signs (Most Recent):  Temp: 98.4 °F (36.9 °C) (05/06/25 1700)  Pulse: 85 (05/06/25 1700)  Resp: 18 (05/06/25 1700)  BP: 139/74 (05/06/25 1700)  SpO2: 100 % (05/06/25 1700) Vital Signs (24h Range):  Temp:  [98.4 °F (36.9 °C)-98.5 °F (36.9 °C)] 98.4 °F (36.9 °C)  Pulse:  [] 85  Resp:  [15-18] 18  SpO2:  [97 %-100 %] 100 %  BP: (111-147)/(61-74) 139/74     Weight: 105.9 kg (233 lb 8 oz)  Body mass index is 31.67 kg/m².     Physical Exam  Vitals and nursing note reviewed.   Constitutional:       General: He is not in acute distress.     Appearance: He is well-developed. He is not diaphoretic.   HENT:      Head: Normocephalic and atraumatic.      Nose: Nose normal.   Eyes:      General: No scleral icterus.     Conjunctiva/sclera: Conjunctivae normal.   Neck:      Vascular: JVD present.   Cardiovascular:      Rate and Rhythm: Normal rate and regular rhythm.      Heart sounds: Normal heart sounds. No murmur heard.     No friction rub. No gallop.   Pulmonary:      Effort: Pulmonary effort is normal. No respiratory distress.      Breath sounds: Normal breath sounds. No stridor. No wheezing or rales.   Chest:      Chest wall: No tenderness.   Abdominal:      General: Bowel sounds are normal. There is no distension.      Palpations: Abdomen is soft.      Tenderness: There is no abdominal tenderness. There is no guarding or rebound.   Musculoskeletal:         General: Swelling (left knee), tenderness (hands, elbows, left knee, feet) and deformity (left knee) present.      Cervical back: Normal range of motion and neck supple.      Right lower leg: Edema (trace) present.      Left lower leg: Edema (trace) present.   Skin:     General: Skin is warm and dry.      Coloration: Skin is not pale.      Findings: No erythema or  rash.   Neurological:      General: No focal deficit present.      Mental Status: He is alert and oriented to person, place, and time.      Motor: Weakness (MS 2/5 to BUE/BLE) and atrophy (muscle atrophy BLE) present.   Psychiatric:         Behavior: Behavior normal.         Thought Content: Thought content normal.                Significant Labs: All pertinent labs within the past 24 hours have been reviewed.    Significant Imaging: I have reviewed all pertinent imaging results/findings within the past 24 hours.

## 2025-05-06 NOTE — HPI
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 Pt reports lower back pain radiating to his abdomen describes as an aching pain rated 8/10 that has occurred on/off for the last 3 days. Pt denies fever, chills, chest pain, SOB, cough, N/V, Diarrhea, or constipation. He reports a normal BM yesterday. Pt also complains of worsening weakness and pain to arms and legs which he attributs to gout. He reports pain is worse to his hands, left knee, and ankles. He reports his left knee has been swollen "for awhile". He reports chronic swelling to BLE. Pt is bed bound at home and lives with his wife and daughters who are his caretakers.   Pt reports abdominal pain has resolved since arrival to ED     In the ED, Afebrile, VSS, oxygenation normal. Labs revealed mild leukopenia with WBC 3.4, Hgb 7.3, BUN 79, sCr 5.7 (baseline 1.9-2.8), Albumin 1.5, , Trop 0.067>0.062. EKG showed sinus tachycardia -rate 108, PACs, nonspecific intraventricular block   CT chest/abd/pelvis showed Small left pleural effusion, Nonspecific distention of the gallbladder, No gallbladder wall thickening or bile duct dilatation, Fatty liver, Native kidneys are small and atrophic, No hydronephrosis, No ureteral stones, Right renal transplant.    Ed provider discussed care with nephrologist, Dr. Gonsalez who recommended IV Lasix drip plus metolazone as needed 5-10 mg per dose   "

## 2025-05-06 NOTE — ASSESSMENT & PLAN NOTE
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 The patient's latest AMPAC (Activity Measure for Post Acute Care) Score is listed below.    AM-PAC Score - How much help does the patient need for each activity listed       Plan  - Progressive mobility protocol initated  - Fall precautions in place

## 2025-05-06 NOTE — ASSESSMENT & PLAN NOTE
Patient has ANGELITO on CKD stage 3 or 4.  Baseline stage difficult to say because of s Cr fluctuations  ANGELITO likely related to CHF exacerbation  Prerenal azotemia  History of DM and hypertension  Was on HD x 2.5 years before the transplant  K normal  Na normal    Currently patient is fluid overloaded and is experiencing dyspnea  Okay to give diuretics but the dose has to be proportional to the amount of fluid gain

## 2025-05-06 NOTE — ED PROVIDER NOTES
SCRIBE #1 NOTE: I, Misha Cosme, am scribing for, and in the presence of, Dianne Owusu MD. I have scribed the entire note.       History     Chief Complaint   Patient presents with    Abdominal Pain     Pt brought in via EMS for left sided abdominal pain x 3 days, denies n/v, and diarrhea. Hx of liver transplant in 2015.      Review of patient's allergies indicates:   Allergen Reactions    Lisinopril Other (See Comments)     Other reaction(s):  cough    Actos  [pioglitazone] Other (See Comments)     Other reaction(s): CHF    Metformin Other (See Comments)     Other reaction(s): renal insuff  Other reaction(s): CHF         History of Present Illness     HPI    5/6/2025, 1:11 PM  History obtained from the patient and medical records      History of Present Illness: Mitch Whittaker is a 69 y.o. male patient with a PMHx of CAD, CHF, end stage kidney disease, DMII, anemia, HLD, HTN, and morbid obesity who presents to the Emergency Department for evaluation of epigastric abdominal pain which began 3 days ago. Pt also complains of gout pains to his arms and legs. Pt does not walk at home and lives with his wife and daughters. Pt also describes difficulty swallowing for the last 2 days; pt was seen recently and had a swallow test done. Symptoms are constant and moderate in severity. Gout pain worsens on movement. Patient denies any n/v/d, fever, CP or SOB. No prior Tx specified.  No further complaints or concerns at this time.       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 RNA NEGATIVE 5/2015, 2021, 2022    History of colon polyps     HPTH (hyperparathyroidism)     Hyperlipidemia     Hypertension associated with stage 3 chronic kidney disease due to type 2 diabetes mellitus     LBBB (left bundle branch block) 12/20/2021    Morbid obesity with BMI of 45.0-49.9, adult     Nephrolithiasis 6/7/2013    PCO (posterior capsular opacification), left 03/04/2019    Proteinuria     resolved s/p transplant    S/P kidney transplant     Sleep apnea     Type 2 diabetes, uncontrolled, with retinopathy     Type II diabetes mellitus with renal manifestations        Past Surgical History:  Past Surgical History:   Procedure Laterality Date    CARDIAC CATHETERIZATION  01/01/2008    normal coronary    CARPAL TUNNEL RELEASE Right 12/01/2023    Procedure: RELEASE, CARPAL TUNNEL;  Surgeon: Noel Almonte MD;  Location: [facility] OR;  Service: Orthopedics;  Laterality: Right;    CARPAL TUNNEL RELEASE Left 7/18/2024    Procedure: RELEASE, CARPAL TUNNEL;  Surgeon: Noel Almonte MD;  Location: [facility] OR;  Service: Orthopedics;  Laterality: Left;    COLONOSCOPY N/A 04/05/2018    Procedure: COLONOSCOPY;  Surgeon: Chava Ronquillo MD;  Location: [facility] ENDO;  Service: Endoscopy;  Laterality: N/A;    COLONOSCOPY N/A 05/02/2022    Procedure: COLONOSCOPY;  Surgeon: Alix Puente MD;  Location: [facility] ENDO;  Service: Endoscopy;  Laterality: N/A;    COLONOSCOPY N/A 06/07/2023    Procedure: COLONOSCOPY - rule out CMV  Cardiac clearance/Eliquis hold approval received on 05/21/23 per Dr. Meade, cardiology.  Note in encounters.   LB;  Surgeon: Daniella Shah MD;  Location: [facility];  Service: Endoscopy;  Laterality: N/A;    ESOPHAGOGASTRODUODENOSCOPY N/A 2024    Procedure: EGD (ESOPHAGOGASTRODUODENOSCOPY) 3/1-pt cleared, ok to hold eliquis for 3 days;  Surgeon: Daniella Shah MD;  Location: [facility] ENDO;  Service: Endoscopy;  Laterality: N/A;    KIDNEY TRANSPLANT  2015    RETINAL LASER PROCEDURE           Family History:  Family History   Problem Relation Name Age of Onset    Diabetes Mother      Hypertension Mother      Heart failure Mother      Heart failure Father      Kidney disease Sister          ESRD    Diabetes Sister      Diabetes Maternal Grandmother      Cancer Neg Hx         Social History:  Social History     Tobacco Use    Smoking status: Former     Current packs/day: 0.00     Types: Cigarettes     Quit date: 2013     Years since quittin.9     Passive exposure: Past    Smokeless tobacco: Former     Quit date: 2013    Tobacco comments:     used marijuana since [id number], stopped after started dialysis   Substance and Sexual Activity    Alcohol use: No     Alcohol/week: 0.0 standard drinks of alcohol    Drug use: Not Currently     Comment:      Sexual activity: Never        Review of Systems     Review of Systems   Constitutional:  Negative for fever.   HENT:  Positive for trouble swallowing. Negative for sore throat.    Respiratory:  Negative for shortness of breath.    Cardiovascular:  Negative for chest pain.   Gastrointestinal:  Positive for abdominal pain (epigastric). Negative for diarrhea, nausea and vomiting.   Genitourinary:  Negative for dysuria.   Musculoskeletal:  Positive for arthralgias (generalized) and myalgias (BLE, BUE). Negative for back pain.   Skin:  Negative for rash.   Neurological:  Negative for weakness.   Hematological:  Does not bruise/bleed easily.   All other systems reviewed and are negative.       Physical Exam     Initial Vitals [25 1110]   BP Pulse Resp Temp  SpO2   119/74 103 15 98.5 °F (36.9 °C) 97 %      MAP       --          Physical Exam  Nursing Notes and Vital Signs Reviewed.  Constitutional: Patient is in no acute distress. Chronically ill-appearing  Head: Atraumatic. Normocephalic.  Eyes: PERRL. EOM intact. Conjunctivae are not pale. No scleral icterus.  ENT: Mucous membranes are moist. Oropharynx is clear and symmetric.    Neck: Supple. Full ROM. No lymphadenopathy.  Cardiovascular: Regular rate. Regular rhythm. No murmurs, rubs, or gallops. Distal pulses are 2+ and symmetric.  Pulmonary/Chest: No respiratory distress. Clear to auscultation bilaterally. No wheezing or rales.  Abdominal: Soft and non-distended. Mild tenderness to upper abdomen.  No rebound, guarding, or rigidity. Good bowel sounds.  Genitourinary: No CVA tenderness.  Musculoskeletal:Pain on light touch. Arthritic changes. Muscle wasting noted. No obvious deformities. No edema. No calf tenderness.  Skin: Warm and dry.  Neurological:  Alert, awake, and appropriate.  Normal speech.  No acute focal neurological deficits are appreciated.  Psychiatric: Normal affect. Good eye contact. Appropriate in content.     ED Course   Critical Care    Date/Time: 5/6/2025 3:39 PM    Performed by: Dianne Owusu MD  Authorized by: Dianne Owsuu MD  Direct patient critical care time: 31 minutes  Additional history critical care time: 5 minutes  Ordering / reviewing critical care time: 5 minutes  Documentation critical care time: 5 minutes  Consulting other physicians critical care time: 5 minutes  Total critical care time (exclusive of procedural time) : 51 minutes  Critical care time was exclusive of separately billable procedures and treating other patients and teaching time.  Critical care was necessary to treat or prevent imminent or life-threatening deterioration of the following conditions: cardiac failure.  Critical care was time spent personally by me on the following activities: blood draw for  specimens, development of treatment plan with patient or surrogate, discussions with consultants, interpretation of cardiac output measurements, evaluation of patient's response to treatment, examination of patient, obtaining history from patient or surrogate, ordering and performing treatments and interventions, ordering and review of laboratory studies, ordering and review of radiographic studies, pulse oximetry and re-evaluation of patient's condition.        ED Vital Signs:  Vitals:    05/06/25 1110 05/06/25 1250 05/06/25 1300 05/06/25 1325   BP: 119/74 117/69 111/65    Pulse: 103 99 97    Resp: 15 18 18 16   Temp: 98.5 °F (36.9 °C)      TempSrc: Oral      SpO2: 97% 100% 100%     05/06/25 1355 05/06/25 1415 05/06/25 1430 05/06/25 1555   BP:  123/61 113/65 (!) 147/73   Pulse: 97 105 102 91   Resp: 18 18 18 18   Temp: 98.4 °F (36.9 °C)      TempSrc: Oral      SpO2:  100% 99% 100%    05/06/25 1605   BP: (!) 142/72   Pulse: 90   Resp: 18   Temp:    TempSrc:    SpO2: 100%       Abnormal Lab Results:  Labs Reviewed   COMPREHENSIVE METABOLIC PANEL - Abnormal       Result Value    Sodium 136      Potassium 4.9      Chloride 98      CO2 24      Glucose 146 (*)     BUN 79 (*)     Creatinine 5.7 (*)     Calcium 9.2      Protein Total 6.5      Albumin 1.5 (*)     Bilirubin Total 0.4       (*)     AST 42      ALT 27      Anion Gap 14      eGFR 10 (*)    CBC WITH DIFFERENTIAL - Abnormal    WBC 3.40 (*)     RBC 3.10 (*)     HGB 7.3 (*)     HCT 25.1 (*)     MCV 81 (*)     MCH 23.5 (*)     MCHC 29.1 (*)     RDW 17.0 (*)     Platelet Count 435      MPV 10.0      Nucleated RBC 0     TROPONIN I - Abnormal    Troponin-I 0.067 (*)    B-TYPE NATRIURETIC PEPTIDE - Abnormal     (*)    MANUAL DIFFERENTIAL - Abnormal    Gran # (ANC) 2.6      Segmented Neutrophil % 75.0 (*)     Lymphocyte % 15.0 (*)     Monocyte % 9.0      Basophil % 1.0      Platelet Estimate Appears Normal      Anisocytosis Slight      Hypochromia  Occasional      Ovalocytes Occasional      Stomatocytes Present      Narrative:     Hyper segs-    LIPASE - Normal    Lipase Level 16     CBC W/ AUTO DIFFERENTIAL    Narrative:     The following orders were created for panel order CBC W/ AUTO DIFFERENTIAL.  Procedure                               Abnormality         Status                     ---------                               -----------         ------                     CBC with Differential[[id number]]       Abnormal            Final result               Manual Differential[[id number]]         Abnormal            Final result                 Please view results for these tests on the individual orders.   URINALYSIS, REFLEX TO URINE CULTURE   TROPONIN I        All Lab Results:  Results for orders placed or performed during the hospital encounter of 05/06/25   Comp. Metabolic Panel    Collection Time: 05/06/25  1:12 PM   Result Value Ref Range    Sodium 136 136 - 145 mmol/L    Potassium 4.9 3.5 - 5.1 mmol/L    Chloride 98 95 - 110 mmol/L    CO2 24 23 - 29 mmol/L    Glucose 146 (H) 70 - 110 mg/dL    BUN 79 (H) 8 - 23 mg/dL    Creatinine 5.7 (H) 0.5 - 1.4 mg/dL    Calcium 9.2 8.7 - 10.5 mg/dL    Protein Total 6.5 6.0 - 8.4 gm/dL    Albumin 1.5 (L) 3.5 - 5.2 g/dL    Bilirubin Total 0.4 0.1 - 1.0 mg/dL     (H) 40 - 150 unit/L    AST 42 11 - 45 unit/L    ALT 27 10 - 44 unit/L    Anion Gap 14 8 - 16 mmol/L    eGFR 10 (L) >60 mL/min/1.73/m2   Lipase    Collection Time: 05/06/25  1:12 PM   Result Value Ref Range    Lipase Level 16 4 - 60 U/L   CBC with Differential    Collection Time: 05/06/25  1:12 PM   Result Value Ref Range    WBC 3.40 (L) 3.90 - 12.70 K/uL    RBC 3.10 (L) 4.60 - 6.20 M/uL    HGB 7.3 (L) 14.0 - 18.0 gm/dL    HCT 25.1 (L) 40.0 - 54.0 %    MCV 81 (L) 82 - 98 fL    MCH 23.5 (L) 27.0 - 31.0 pg    MCHC 29.1 (L) 32.0 - 36.0 g/dL    RDW 17.0 (H) 11.5 - 14.5 %    Platelet Count 435 150 - 450 K/uL    MPV 10.0 9.2 - 12.9 fL    Nucleated RBC 0 <=0  /100 WBC   Troponin I #1    Collection Time: 05/06/25  1:12 PM   Result Value Ref Range    Troponin-I 0.067 (H) <=0.026 ng/mL   Manual Differential    Collection Time: 05/06/25  1:12 PM   Result Value Ref Range    Gran # (ANC) 2.6 K/uL    Segmented Neutrophil % 75.0 (H) 38.0 - 73.0 %    Lymphocyte % 15.0 (L) 18.0 - 48.0 %    Monocyte % 9.0 4.0 - 15.0 %    Basophil % 1.0 <=1.9 %    Platelet Estimate Appears Normal      Anisocytosis Slight     Hypochromia Occasional     Ovalocytes Occasional     Stomatocytes Present    BNP    Collection Time: 05/06/25  1:28 PM   Result Value Ref Range     (H) 0 - 99 pg/mL     *Note: Due to a large number of results and/or encounters for the requested time period, some results have not been displayed. A complete set of results can be found in Results Review.       Imaging Results:  Imaging Results              CT Chest Abdomen Pelvis Without Contrast (XPD) (Final result)  Result time 05/06/25 16:16:17   Procedure changed from CT Abdomen Pelvis  Without Contrast     Final result by Shefali Cummins MD (Timothy) (05/06/25 16:16:17)                   Impression:      Small left pleural effusion.  No lung infiltrates.    Nonspecific gallbladder distension.  Consider gallbladder ultrasound.  No acute bowel abnormalities.    All CT scans at this facility use dose modulation, iterative reconstructions, and/or weight base dosing when appropriate to reduce radiation dose to as low as reasonably achievable      Electronically signed by: Shefali Cummins MD  Date:    05/06/2025  Time:    16:16               Narrative:    EXAMINATION:  CT CHEST ABDOMEN PELVIS WITHOUT CONTRAST(XPD)    CLINICAL HISTORY:  Epigastric pain; nausea and vomiting    TECHNIQUE:  Noncontrast images    COMPARISON:  None    FINDINGS:  Chest:    The heart is mildly enlarged.  There are coronary artery calcifications.  No evidence of mediastinal or hilar adenopathy.  No lung infiltrates.  Small left pleural  effusion.    The skeleton is intact.    Abdomen pelvis:    There are few hepatic cysts.  No definite suspicious liver masses    Nonspecific distention of the gallbladder.  No gallbladder wall thickening or bile duct dilatation.  Spleen is unremarkable.    Fatty liver.    Native kidneys are small and atrophic.  No hydronephrosis.  No ureteral stones.  No masses.  Right renal transplant.  No hydronephrosis.  No masses.    The aorta and inferior vena cava are unremarkable.    There are no acute bowel abnormalities.   Normal appendix.  No evidence of diverticulitis.    Bladder is normal. No abnormal masses or fluid collections in the pelvis.    Skeletal structures are intact.  No acute skeletal findings.                                       X-Ray Chest AP Portable (Final result)  Result time 05/06/25 14:37:03      Final result by Andrew Crespo MD (05/06/25 14:37:03)                   Impression:      No acute process seen.  Very limited study.      Electronically signed by: Andrew Crespo MD  Date:    05/06/2025  Time:    14:37               Narrative:    EXAMINATION:  XR CHEST AP PORTABLE    CLINICAL HISTORY:  Chest Pain;    FINDINGS:  Single view of the chest.  Comparison 04/01/25. limited by stool low lung volumes.    Cardiac silhouette is enlarged but stable.  The lungs demonstrate no evidence of active disease.  No evidence of pleural effusion or pneumothorax.  Bones appear intact.  Moderate degenerative changes and moderate atherosclerotic disease.                                       The EKG was ordered, reviewed, and independently interpreted by the ED provider.  Interpretation time: 12:02 PM  Rate: 108 BPM  Rhythm: sinus tachycardia with premature atrial complexes  Interpretation: Right axis deviation. Nonspecific intraventricular block. No STEMI.             The Emergency Provider reviewed the vital signs and test results, which are outlined above.     ED Discussion     3:36 PM: Discussed pt's case with  Dr. Gonsalez (Nephrology) who recommends against fluids. Dr. Gonsalez believes pt is going into heart failure. Dr. Gonsalez recommends 5-10 mg lasix per hour IV infusion and admit.     Pt comes in with the abdominal pain for 1 day. His BUN creatinine is double what it normally is. Seventy-nine and 5.7 and he is usually around 2-2.3 his creatinine. His EF is only 40%       4:36 PM: Discussed case with Emma Balbeuna NP ([facility]). Dr. Lazo agrees with current care and management of pt and accepts admission.   Admitting Service: [facility]  Admitting Physician: Dr. Lazo  Admit to: Inpatient    4:36 PM: Re-evaluated pt. I have discussed test results, shared treatment plan, and the need for admission with patient/family/caretaker at bedside. Pt and/or family/caretaker express understanding at this time and agree with all information. All questions answered. Pt/caretaker/family member(s) have no further questions or concerns at this time. Pt is ready for admit.          Medical Decision Making  Differential diagnosis;  Gastroenteritis, Bowel obstruction, Colitis, Diverticulitis, Cholecystitis, Appendicitis, Perforated bowel, Herniation, Infectious etiology, UTI, Pyelonephritis,  Biliary obstruction, kidney stone       Amount and/or Complexity of Data Reviewed  Labs: ordered.  Radiology: ordered.    Risk  Prescription drug management.  Decision regarding hospitalization.    Critical Care  Total time providing critical care: 51 minutes                ED Medication(s):  Medications   furosemide (Lasix) 200 mg in 0.9% NaCl SolP 100 mL continuous infusion (conc: 2 mg/mL) (5 mg/hr Intravenous New Bag 5/6/25 1628)   tacrolimus capsule 3 mg (has no administration in time range)   predniSONE tablet 5 mg (has no administration in time range)   mycophenolate capsule 500 mg (has no administration in time range)   ondansetron injection 4 mg (4 mg Intravenous Given 5/6/25 1325)   morphine injection 4 mg (4 mg  Intravenous Given 5/6/25 1325)       New Prescriptions    No medications on file               Scribe Attestation:   Scribe #1: I performed the above scribed service and the documentation accurately describes the services I performed. I attest to the accuracy of the note.     Attending:   Physician Attestation Statement for Scribe #1: I, Dianne Owusu MD, personally performed the services described in this documentation, as scribed by Misha Cosme, in my presence, and it is both accurate and complete.           Clinical Impression       ICD-10-CM ICD-9-CM   1. Chest pain  R07.9 786.50       Disposition:   Disposition: Admitted  Condition: Fair        Dianne Owusu MD  05/06/25 [id number]

## 2025-05-06 NOTE — ASSESSMENT & PLAN NOTE
"Patient's FSGs are controlled on current medication regimen.  Last A1c reviewed-   Lab Results   Component Value Date    HGBA1C 5.7 (H) 12/17/2024     Most recent fingerstick glucose reviewed- No results for input(s): "POCTGLUCOSE" in the last 24 hours.  Current correctional scale  Low  Maintain anti-hyperglycemic dose as follows-   Antihyperglycemics (From admission, onward)      Start     Stop Route Frequency Ordered    05/06/25 2100  insulin glargine U-100 (Lantus) pen 5 Units         -- SubQ 2 times daily 05/06/25 1644    05/06/25 1813  insulin aspart U-100 pen 0-5 Units         -- SubQ Before meals & nightly PRN 05/06/25 1713          Hold Oral hypoglycemics while patient is in the hospital.    Cont Lantus- titrate   "

## 2025-05-06 NOTE — SUBJECTIVE & OBJECTIVE
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 RNA NEGATIVE 5/2015, 2021, 2022    History of colon polyps     HPTH (hyperparathyroidism)     Hyperlipidemia     Hypertension associated with stage 3 chronic kidney disease due to type 2 diabetes mellitus     LBBB (left bundle branch block) 12/20/2021    Morbid obesity with BMI of 45.0-49.9, adult     Nephrolithiasis 6/7/2013    PCO (posterior capsular opacification), left 03/04/2019    Proteinuria     resolved s/p transplant    S/P kidney transplant     Sleep apnea     Type 2 diabetes, uncontrolled, with retinopathy     Type II diabetes mellitus with renal manifestations        Past Surgical History:   Procedure Laterality Date    CARDIAC CATHETERIZATION  01/01/2008    normal coronary    CARPAL TUNNEL RELEASE Right 12/01/2023    Procedure: RELEASE, CARPAL TUNNEL;  Surgeon: Noel Almonte MD;  Location: [facility] OR;  Service: Orthopedics;  Laterality: Right;    CARPAL TUNNEL RELEASE Left 7/18/2024    Procedure: RELEASE, CARPAL TUNNEL;  Surgeon: Noel Almonte MD;  Location: [facility] OR;  Service: Orthopedics;  Laterality: Left;    COLONOSCOPY N/A 04/05/2018    Procedure: COLONOSCOPY;  Surgeon: Chava Ronquillo MD;  Location: [facility] ENDO;   Service: Endoscopy;  Laterality: N/A;    COLONOSCOPY N/A 05/02/2022    Procedure: COLONOSCOPY;  Surgeon: Alix Puente MD;  Location: [facility];  Service: Endoscopy;  Laterality: N/A;    COLONOSCOPY N/A 06/07/2023    Procedure: COLONOSCOPY - rule out CMV  Cardiac clearance/Eliquis hold approval received on 05/21/23 per Dr. Meade, cardiology.  Note in encounters.  LB;  Surgeon: Daniella Shah MD;  Location: [facility] ENDO;  Service: Endoscopy;  Laterality: N/A;    ESOPHAGOGASTRODUODENOSCOPY N/A 03/26/2024    Procedure: EGD (ESOPHAGOGASTRODUODENOSCOPY) 3/1-pt cleared, ok to hold eliquis for 3 days;  Surgeon: Daniella Shah MD;  Location: [facility];  Service: Endoscopy;  Laterality: N/A;    KIDNEY TRANSPLANT  2015    RETINAL LASER PROCEDURE         Review of patient's allergies indicates:   Allergen Reactions    Lisinopril Other (See Comments)     Other reaction(s):  cough    Actos  [pioglitazone] Other (See Comments)     Other reaction(s): CHF    Metformin Other (See Comments)     Other reaction(s): renal insuff  Other reaction(s): CHF     Current Facility-Administered Medications   Medication Frequency    acetaminophen tablet 650 mg Once PRN    furosemide (Lasix) 200 mg in 0.9% NaCl SolP 100 mL continuous infusion (conc: 2 mg/mL) Continuous    mycophenolate capsule 500 mg BID    [START ON 5/7/2025] predniSONE tablet 5 mg Daily    tacrolimus capsule 3 mg BID     Current Outpatient Medications   Medication    albuterol (PROVENTIL) 2.5 mg /3 mL (0.083 %) nebulizer solution    albuterol (PROVENTIL/VENTOLIN HFA) 90 mcg/actuation inhaler    allopurinoL (ZYLOPRIM) 100 MG tablet    apixaban (ELIQUIS) 5 mg Tab    aspirin (ECOTRIN) 81 MG EC tablet    blood-glucose sensor (DEXCOM G7 SENSOR) Alba    calcitRIOL (ROCALTROL) 0.25 MCG Cap    famotidine (PEPCID) 20 MG tablet    ferrous sulfate (FEROSUL) 325 mg (65 mg iron) Tab tablet    fish oil-omega-3 fatty acids 300-1,000 mg capsule    furosemide (LASIX) 40 MG tablet     HYDROcodone-acetaminophen (NORCO) 5-325 mg per tablet    insulin aspart U-100 (NOVOLOG FLEXPEN U-100 INSULIN) 100 unit/mL (3 mL) InPn pen    insulin aspart U-100 (NOVOLOG) 100 unit/mL (3 mL) InPn pen    insulin glargine U-100, Lantus, (LANTUS SOLOSTAR U-100 INSULIN) 100 unit/mL (3 mL) InPn pen    ketoconazole (NIZORAL) 200 mg Tab    metoprolol succinate (TOPROL-XL) 25 MG 24 hr tablet    multivitamin (THERAGRAN) tablet    mycophenolate (CELLCEPT) 250 mg Cap    potassium chloride SA (K-DUR,KLOR-CON) 20 MEQ tablet    predniSONE (DELTASONE) 5 MG tablet    rosuvastatin (CRESTOR) 40 MG Tab    semaglutide (OZEMPIC) 2 mg/dose (8 mg/3 mL) PnIj    sodium bicarbonate 650 MG tablet    tacrolimus (PROGRAF) 1 MG Cap    triamcinolone acetonide 0.1% (KENALOG) 0.1 % ointment     Family History       Problem Relation (Age of Onset)    Diabetes Mother, Sister, Maternal Grandmother    Heart failure Mother, Father    Hypertension Mother    Kidney disease Sister          Tobacco Use    Smoking status: Former     Current packs/day: 0.00     Types: Cigarettes     Quit date: 2013     Years since quittin.9     Passive exposure: Past    Smokeless tobacco: Former     Quit date: 2013    Tobacco comments:     used marijuana since [id number], stopped after started dialysis   Substance and Sexual Activity    Alcohol use: No     Alcohol/week: 0.0 standard drinks of alcohol    Drug use: Not Currently     Comment:      Sexual activity: Never     Review of Systems   Constitutional: Negative.    HENT: Negative.     Respiratory:  Positive for shortness of breath. Negative for cough.    Cardiovascular:  Positive for leg swelling. Negative for chest pain.   Gastrointestinal: Negative.    Neurological: Negative.    Psychiatric/Behavioral: Negative.       Objective:     Vital Signs (Most Recent):  Temp: 98.4 °F (36.9 °C) (25 1355)  Pulse: 90 (25 1605)  Resp: 18 (25 1605)  BP: (!) 142/72 (25 1605)  SpO2: 100 % (25  1605) Vital Signs (24h Range):  Temp:  [98.4 °F (36.9 °C)-98.5 °F (36.9 °C)] 98.4 °F (36.9 °C)  Pulse:  [] 90  Resp:  [15-18] 18  SpO2:  [97 %-100 %] 100 %  BP: (111-147)/(61-74) 142/72        There is no height or weight on file to calculate BMI.  There is no height or weight on file to calculate BSA.    No intake/output data recorded.     Physical Exam  Vitals and nursing note reviewed.   Constitutional:       Appearance: Normal appearance.   HENT:      Head: Normocephalic and atraumatic.   Cardiovascular:      Rate and Rhythm: Normal rate and regular rhythm.      Pulses: Normal pulses.      Heart sounds: Normal heart sounds.   Pulmonary:      Effort: Pulmonary effort is normal.      Breath sounds: Rales present.   Abdominal:      Palpations: Abdomen is soft.      Tenderness: There is no guarding.   Musculoskeletal:      Right lower leg: Edema present.      Left lower leg: Edema present.   Neurological:      Mental Status: He is alert and oriented to person, place, and time.   Psychiatric:         Behavior: Behavior normal.          Significant Labs:  Reviewed  BMP  Lab Results   Component Value Date     05/06/2025    K 4.9 05/06/2025    CL 98 05/06/2025    CO2 24 05/06/2025    BUN 79 (H) 05/06/2025    CREATININE 5.7 (H) 05/06/2025    CALCIUM 9.2 05/06/2025    ANIONGAP 14 05/06/2025    EGFRNORACEVR 10 (L) 05/06/2025     Lab Results   Component Value Date    WBC 3.40 (L) 05/06/2025    HGB 7.3 (L) 05/06/2025    HCT 25.1 (L) 05/06/2025    MCV 81 (L) 05/06/2025     05/06/2025       Recent Labs   Lab 05/06/25  1312   TROPONINI 0.067*       Significant Imaging:  Reviewed chest x-ray:  Has pulmonary edema, cardiomegaly

## 2025-05-07 ENCOUNTER — EXTERNAL HOME HEALTH (OUTPATIENT)
Dept: HOME HEALTH SERVICES | Facility: HOSPITAL | Age: 70
End: 2025-05-07
Payer: COMMERCIAL

## 2025-05-07 ENCOUNTER — DOCUMENTATION ONLY (OUTPATIENT)
Dept: CARDIOLOGY | Facility: CLINIC | Age: 70
End: 2025-05-07
Payer: COMMERCIAL

## 2025-05-07 LAB
ABO + RH BLD: NORMAL
ABSOLUTE NEUTROPHIL MANUAL (OHS): 1.7 K/UL
ALBUMIN SERPL BCP-MCNC: 1.4 G/DL (ref 3.5–5.2)
ALP SERPL-CCNC: 238 UNIT/L (ref 40–150)
ALT SERPL W/O P-5'-P-CCNC: 30 UNIT/L (ref 10–44)
ANION GAP (OHS): 13 MMOL/L (ref 8–16)
AST SERPL-CCNC: 41 UNIT/L (ref 11–45)
BILIRUB SERPL-MCNC: 0.4 MG/DL (ref 0.1–1)
BILIRUB UR QL STRIP.AUTO: NEGATIVE
BLD PROD TYP BPU: NORMAL
BLOOD UNIT EXPIRATION DATE: NORMAL
BLOOD UNIT TYPE CODE: 7300
BUN SERPL-MCNC: 82 MG/DL (ref 8–23)
CALCIUM SERPL-MCNC: 9.3 MG/DL (ref 8.7–10.5)
CHLORIDE SERPL-SCNC: 100 MMOL/L (ref 95–110)
CLARITY UR: CLEAR
CO2 SERPL-SCNC: 23 MMOL/L (ref 23–29)
COLOR UR AUTO: YELLOW
CREAT SERPL-MCNC: 5.7 MG/DL (ref 0.5–1.4)
CREAT UR-MCNC: 38.6 MG/DL (ref 23–375)
CREAT UR-MCNC: 39.1 MG/DL (ref 23–375)
CROSSMATCH INTERPRETATION: NORMAL
DISPENSE STATUS: NORMAL
EAG (OHS): 134 MG/DL (ref 68–131)
EOSINOPHIL NFR BLD MANUAL: 2 % (ref 0–8)
ERYTHROCYTE [DISTWIDTH] IN BLOOD BY AUTOMATED COUNT: 17 % (ref 11.5–14.5)
GFR SERPLBLD CREATININE-BSD FMLA CKD-EPI: 10 ML/MIN/1.73/M2
GLUCOSE SERPL-MCNC: 126 MG/DL (ref 70–110)
GLUCOSE UR QL STRIP: NEGATIVE
HBA1C MFR BLD: 6.3 % (ref 4–5.6)
HCT VFR BLD AUTO: 25 % (ref 40–54)
HGB BLD-MCNC: 6.9 GM/DL (ref 14–18)
HGB UR QL STRIP: ABNORMAL
HOLD SPECIMEN: NORMAL
INDIRECT COOMBS: NORMAL
KETONES UR QL STRIP: NEGATIVE
LEUKOCYTE ESTERASE UR QL STRIP: NEGATIVE
LYMPHOCYTES NFR BLD MANUAL: 33 % (ref 18–48)
MAGNESIUM SERPL-MCNC: 1.7 MG/DL (ref 1.6–2.6)
MCH RBC QN AUTO: 22.8 PG (ref 27–31)
MCHC RBC AUTO-ENTMCNC: 27.6 G/DL (ref 32–36)
MCV RBC AUTO: 83 FL (ref 82–98)
MONOCYTES NFR BLD MANUAL: 10 % (ref 4–15)
NEUTROPHILS NFR BLD MANUAL: 55 % (ref 38–73)
NITRITE UR QL STRIP: NEGATIVE
NUCLEATED RBC (/100WBC) (OHS): 0 /100 WBC
PH UR STRIP: 5 [PH]
PHOSPHATE SERPL-MCNC: 5.4 MG/DL (ref 2.7–4.5)
PLATELET # BLD AUTO: 387 K/UL (ref 150–450)
PMV BLD AUTO: 10.1 FL (ref 9.2–12.9)
POCT GLUCOSE: 112 MG/DL (ref 70–110)
POCT GLUCOSE: 202 MG/DL (ref 70–110)
POCT GLUCOSE: 209 MG/DL (ref 70–110)
POTASSIUM SERPL-SCNC: 5 MMOL/L (ref 3.5–5.1)
PROT SERPL-MCNC: 6.2 GM/DL (ref 6–8.4)
PROT UR QL STRIP: ABNORMAL
PROT UR-MCNC: 19 MG/DL
PROT/CREAT UR: 0.49 MG/G{CREAT}
RBC # BLD AUTO: 3.02 M/UL (ref 4.6–6.2)
RH BLD: NORMAL
SODIUM SERPL-SCNC: 136 MMOL/L (ref 136–145)
SODIUM UR-SCNC: 71 MMOL/L (ref 20–250)
SP GR UR STRIP: 1.01
SPECIMEN OUTDATE: NORMAL
TACROLIMUS BLD-MCNC: 10 NG/ML (ref 5–15)
UNIT NUMBER: NORMAL
UROBILINOGEN UR STRIP-ACNC: NEGATIVE EU/DL
UUN UR-MCNC: 249 MG/DL (ref 140–1050)
WBC # BLD AUTO: 3.16 K/UL (ref 3.9–12.7)

## 2025-05-07 PROCEDURE — 86850 RBC ANTIBODY SCREEN: CPT | Performed by: INTERNAL MEDICINE

## 2025-05-07 PROCEDURE — 63600175 PHARM REV CODE 636 W HCPCS: Performed by: NURSE PRACTITIONER

## 2025-05-07 PROCEDURE — 86920 COMPATIBILITY TEST SPIN: CPT | Performed by: INTERNAL MEDICINE

## 2025-05-07 PROCEDURE — 83735 ASSAY OF MAGNESIUM: CPT | Performed by: NURSE PRACTITIONER

## 2025-05-07 PROCEDURE — 84540 ASSAY OF URINE/UREA-N: CPT | Performed by: NURSE PRACTITIONER

## 2025-05-07 PROCEDURE — 25000003 PHARM REV CODE 250: Performed by: INTERNAL MEDICINE

## 2025-05-07 PROCEDURE — 84300 ASSAY OF URINE SODIUM: CPT | Performed by: NURSE PRACTITIONER

## 2025-05-07 PROCEDURE — 36415 COLL VENOUS BLD VENIPUNCTURE: CPT | Performed by: INTERNAL MEDICINE

## 2025-05-07 PROCEDURE — 81003 URINALYSIS AUTO W/O SCOPE: CPT | Performed by: EMERGENCY MEDICINE

## 2025-05-07 PROCEDURE — 63600175 PHARM REV CODE 636 W HCPCS: Performed by: INTERNAL MEDICINE

## 2025-05-07 PROCEDURE — 82570 ASSAY OF URINE CREATININE: CPT | Performed by: NURSE PRACTITIONER

## 2025-05-07 PROCEDURE — 99233 SBSQ HOSP IP/OBS HIGH 50: CPT | Mod: ,,, | Performed by: INTERNAL MEDICINE

## 2025-05-07 PROCEDURE — 84156 ASSAY OF PROTEIN URINE: CPT | Performed by: INTERNAL MEDICINE

## 2025-05-07 PROCEDURE — 80053 COMPREHEN METABOLIC PANEL: CPT | Performed by: INTERNAL MEDICINE

## 2025-05-07 PROCEDURE — 94761 N-INVAS EAR/PLS OXIMETRY MLT: CPT

## 2025-05-07 PROCEDURE — 80197 ASSAY OF TACROLIMUS: CPT | Performed by: INTERNAL MEDICINE

## 2025-05-07 PROCEDURE — 25000003 PHARM REV CODE 250: Performed by: NURSE PRACTITIONER

## 2025-05-07 PROCEDURE — P9016 RBC LEUKOCYTES REDUCED: HCPCS | Performed by: INTERNAL MEDICINE

## 2025-05-07 PROCEDURE — 99900035 HC TECH TIME PER 15 MIN (STAT)

## 2025-05-07 PROCEDURE — 21400001 HC TELEMETRY ROOM

## 2025-05-07 PROCEDURE — 30233N1 TRANSFUSION OF NONAUTOLOGOUS RED BLOOD CELLS INTO PERIPHERAL VEIN, PERCUTANEOUS APPROACH: ICD-10-PCS | Performed by: INTERNAL MEDICINE

## 2025-05-07 PROCEDURE — 84100 ASSAY OF PHOSPHORUS: CPT | Performed by: NURSE PRACTITIONER

## 2025-05-07 PROCEDURE — 83036 HEMOGLOBIN GLYCOSYLATED A1C: CPT | Performed by: NURSE PRACTITIONER

## 2025-05-07 PROCEDURE — 85027 COMPLETE CBC AUTOMATED: CPT | Performed by: INTERNAL MEDICINE

## 2025-05-07 PROCEDURE — 36430 TRANSFUSION BLD/BLD COMPNT: CPT

## 2025-05-07 RX ORDER — SODIUM FERRIC GLUCONATE COMPLEX IN SUCROSE 12.5 MG/ML
125 INJECTION INTRAVENOUS DAILY
Status: DISCONTINUED | OUTPATIENT
Start: 2025-05-07 | End: 2025-05-13 | Stop reason: HOSPADM

## 2025-05-07 RX ORDER — FEBUXOSTAT 40 MG/1
40 TABLET, FILM COATED ORAL DAILY
Status: DISCONTINUED | OUTPATIENT
Start: 2025-05-07 | End: 2025-05-13 | Stop reason: HOSPADM

## 2025-05-07 RX ORDER — TACROLIMUS 1 MG/1
2 CAPSULE ORAL 2 TIMES DAILY
Status: DISCONTINUED | OUTPATIENT
Start: 2025-05-08 | End: 2025-05-13 | Stop reason: HOSPADM

## 2025-05-07 RX ORDER — METOLAZONE 5 MG/1
5 TABLET ORAL ONCE
Status: COMPLETED | OUTPATIENT
Start: 2025-05-07 | End: 2025-05-07

## 2025-05-07 RX ORDER — HYDROCODONE BITARTRATE AND ACETAMINOPHEN 500; 5 MG/1; MG/1
TABLET ORAL
Status: DISCONTINUED | OUTPATIENT
Start: 2025-05-07 | End: 2025-05-13 | Stop reason: HOSPADM

## 2025-05-07 RX ADMIN — INSULIN ASPART 2 UNITS: 100 INJECTION, SOLUTION INTRAVENOUS; SUBCUTANEOUS at 06:05

## 2025-05-07 RX ADMIN — SODIUM FERRIC GLUCONATE COMPLEX 125 MG: 12.5 INJECTION INTRAVENOUS at 04:05

## 2025-05-07 RX ADMIN — MYCOPHENOLATE MOFETIL 500 MG: 250 CAPSULE ORAL at 08:05

## 2025-05-07 RX ADMIN — METOLAZONE 5 MG: 5 TABLET ORAL at 10:05

## 2025-05-07 RX ADMIN — SENNOSIDES AND DOCUSATE SODIUM 1 TABLET: 50; 8.6 TABLET ORAL at 08:05

## 2025-05-07 RX ADMIN — ASPIRIN 81 MG: 81 TABLET, COATED ORAL at 08:05

## 2025-05-07 RX ADMIN — INSULIN GLARGINE 5 UNITS: 100 INJECTION, SOLUTION SUBCUTANEOUS at 08:05

## 2025-05-07 RX ADMIN — APIXABAN 5 MG: 2.5 TABLET, FILM COATED ORAL at 08:05

## 2025-05-07 RX ADMIN — FAMOTIDINE 20 MG: 20 TABLET, FILM COATED ORAL at 08:05

## 2025-05-07 RX ADMIN — ALLOPURINOL 50 MG: 300 TABLET ORAL at 08:05

## 2025-05-07 RX ADMIN — INSULIN ASPART 2 UNITS: 100 INJECTION, SOLUTION INTRAVENOUS; SUBCUTANEOUS at 12:05

## 2025-05-07 RX ADMIN — FEBUXOSTAT 40 MG: 40 TABLET, FILM COATED ORAL at 04:05

## 2025-05-07 RX ADMIN — POLYETHYLENE GLYCOL 3350 17 G: 17 POWDER, FOR SOLUTION ORAL at 08:05

## 2025-05-07 RX ADMIN — METOPROLOL SUCCINATE 25 MG: 25 TABLET, EXTENDED RELEASE ORAL at 09:05

## 2025-05-07 RX ADMIN — FUROSEMIDE 10 MG/HR: 10 INJECTION, SOLUTION INTRAMUSCULAR; INTRAVENOUS at 10:05

## 2025-05-07 RX ADMIN — TACROLIMUS 3 MG: 1 CAPSULE ORAL at 08:05

## 2025-05-07 RX ADMIN — EPOETIN ALFA 5000 UNITS: 10000 SOLUTION INTRAVENOUS; SUBCUTANEOUS at 04:05

## 2025-05-07 RX ADMIN — CALCITRIOL CAPSULES 0.25 MCG 0.25 MCG: 0.25 CAPSULE ORAL at 08:05

## 2025-05-07 RX ADMIN — PREDNISONE 5 MG: 5 TABLET ORAL at 08:05

## 2025-05-07 NOTE — HOSPITAL COURSE
Patient is a 69-year-old male with a history of coronary artery disease, biventricular CHF with an EF of 35-40%, status post living related donor kidney transplant 2015 on chronic immunosuppression, ANGELITO on CKD 3, diabetes mellitus, anemia, hypertension, history of DVT on Eliquis, gout, chronic venous stasis, morbid obesity.  Patient presented to the emergency department complaining of back pain radiating to his abdomen 8/10.  He reports it is has been occurring for approximately 3 days prior to admission.  Health who complains of generalized body ache worse pain in his legs and knees.  He states this has been going on for quite some time.  Emergency department labs revealed a white count of 3.4 hemoglobin 7.3 creatinine of 5.7 which is up from about 3.  Albumin is 1.5.  CT scan chest abdomen and pelvis revealed a small left pleural effusion otherwise none specific.    Patient was seen by his nephrologist Dr. Gonsalez who initiated Lasix drip with metolazone.  After review by rounding nephrologist it was felt that he was maybe slightly over diuresed.  Diuretics were held and patient was started on normal saline at 100 cc an hour for 2 L.  Prograf level noted to be 10 and Prograf decreased to 2 mg b.i.d. recheck level in a.m.  Markedly elevated uric acid, patient was initiated on Uloric.  Seen and examined by Physical therapy/Occupational therapy who felt that patient was at his baseline status and declined further intervention.     As of 5/12/25 patient was scheduled to have colonoscopy but did not complete bowel prep. He did not have interest in completing bowel prep this admission, so GI will offer OP if he is agreeable. H/H has remained stable. He was asked to hold calcitrol until instructed by Nephrology. He will also continue ketconazole. He was asked to follow up with PCP to repeat tacrolimus level. For gout, he will continue Uloric instead of Allopurinol. He will hold STATINs while taking Uloric. Patient will  discharge with HH and family support. Patient seen and examined on date of discharge and deemed suitable.

## 2025-05-07 NOTE — SUBJECTIVE & OBJECTIVE
Interval History: Pt was seen and examined. Labs and meds reviewed. Discussed with other providers.  No new events.  Feels slightly better with less SOB.  Still feels puffy.    Review of patient's allergies indicates:   Allergen Reactions    Lisinopril Other (See Comments)     Other reaction(s):  cough    Actos  [pioglitazone] Other (See Comments)     Other reaction(s): CHF    Metformin Other (See Comments)     Other reaction(s): renal insuff  Other reaction(s): CHF     Current Facility-Administered Medications   Medication Frequency    0.9%  NaCl infusion (for blood administration) Q24H PRN    acetaminophen tablet 650 mg Q8H PRN    allopurinol split tablet 50 mg Daily    apixaban tablet 5 mg BID    aspirin EC tablet 81 mg Daily    calcitRIOL capsule 0.25 mcg Daily    dextrose 50% injection 12.5 g PRN    dextrose 50% injection 25 g PRN    famotidine tablet 20 mg Every other day    furosemide (Lasix) 200 mg in 0.9% NaCl SolP 100 mL continuous infusion (conc: 2 mg/mL) Continuous    glucagon (human recombinant) injection 1 mg PRN    glucose chewable tablet 16 g PRN    glucose chewable tablet 24 g PRN    insulin aspart U-100 pen 0-5 Units QID (AC + HS) PRN    insulin glargine U-100 (Lantus) pen 5 Units BID    metoprolol succinate (TOPROL-XL) 24 hr tablet 25 mg Daily    mycophenolate capsule 500 mg BID    ondansetron injection 4 mg Q12H PRN    polyethylene glycol packet 17 g Daily    predniSONE tablet 5 mg Daily    prochlorperazine injection Soln 5 mg Q6H PRN    senna-docusate 8.6-50 mg per tablet 1 tablet BID    sodium chloride 0.9% flush 10 mL PRN    tacrolimus capsule 3 mg BID       Objective:     Vital Signs (Most Recent):  Temp: 96.2 °F (35.7 °C) (05/07/25 1256)  Pulse: 89 (05/07/25 1256)  Resp: 17 (05/07/25 1256)  BP: (!) 116/58 (05/07/25 1256)  SpO2: 96 % (05/07/25 1256) Vital Signs (24h Range):  Temp:  [96.2 °F (35.7 °C)-98.7 °F (37.1 °C)] 96.2 °F (35.7 °C)  Pulse:  [] 89  Resp:  [16-19] 17  SpO2:  [95  %-100 %] 96 %  BP: (102-147)/(55-74) 116/58     Weight: 94.9 kg (209 lb 3.5 oz) (05/07/25 0600)  Body mass index is 28.37 kg/m².  Body surface area is 2.2 meters squared.    I/O last 3 completed shifts:  In: 648.9 [P.O.:120; I.V.:28.9; IV Piggyback:500]  Out: -      Physical Exam  Vitals and nursing note reviewed.   Constitutional:       General: He is not in acute distress.     Appearance: Normal appearance. He is ill-appearing.   Cardiovascular:      Rate and Rhythm: Normal rate and regular rhythm.      Pulses: Normal pulses.      Heart sounds: Normal heart sounds.   Pulmonary:      Effort: Pulmonary effort is normal.      Breath sounds: Rales present.   Abdominal:      Palpations: Abdomen is soft.      Tenderness: There is no abdominal tenderness.   Musculoskeletal:      Right lower leg: Edema present.      Left lower leg: Edema present.   Neurological:      Mental Status: He is alert and oriented to person, place, and time.   Psychiatric:         Behavior: Behavior normal.          Significant Labs:  Reviewed  BMP  Lab Results   Component Value Date     05/07/2025    K 5.0 05/07/2025     05/07/2025    CO2 23 05/07/2025    BUN 82 (H) 05/07/2025    CREATININE 5.7 (H) 05/07/2025    CALCIUM 9.3 05/07/2025    ANIONGAP 13 05/07/2025    EGFRNORACEVR 10 (L) 05/07/2025     Lab Results   Component Value Date    WBC 3.16 (L) 05/07/2025    HGB 6.9 (L) 05/07/2025    HCT 25.0 (L) 05/07/2025    MCV 83 05/07/2025     05/07/2025     Recent Labs   Lab 05/06/25  1628   TROPONINI 0.062*     Lab Results   Component Value Date    UIBC 263 03/30/2010    IRON 24 (L) 12/26/2024    TRANSFERRIN 165 (L) 12/26/2024    TIBC 244 (L) 12/26/2024    FESATURATED 10 (L) 12/26/2024          Prograf level pending      Significant Imaging:  She chest x-ray

## 2025-05-07 NOTE — ASSESSMENT & PLAN NOTE
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 The patient's latest AMPAC (Activity Measure for Post Acute Care) Score is listed below.    AM-PAC Score - How much help does the patient need for each activity listed  Basic Mobility Total Score: 7  Turning over in bed (including adjusting bedclothes, sheets and blankets)?: Unable  Sitting down on and standing up from a chair with arms (e.g., wheelchair, bedside commode, etc.): Unable  Moving from lying on back to sitting on the side of the bed?: Unable  Moving to and from a bed to a chair (including a wheelchair)?: A lot  Need to walk in hospital room?: Unable  Climbing 3-5 steps with a railing?: Unable    Plan  - Progressive mobility protocol initated  - Fall precautions in place

## 2025-05-07 NOTE — CONSULTS
Food & Nutrition Education    Diet Education: Cardiac, Diabetic, Fluid restrcition diet    Learners: Patient    Nutrition Education provided with handouts:  Heart Healthy Consistent Carbohydrate Nutrition Therapy  Fluid Restristed Nutrition Therapy  (nutritioncaremanual.org)    Comments:  PMH: MARION, Kidney transplant (2015), Gout, ANGELITO on CKD 4, Immunosuppression, T2DM, Hyperparathyroidism, Arthralgia, DVT.      69 y.o. Male admitted for Acute on chronic combined systolic and diastolic congestive heart failure. Pt is currently on a Heart healthy, Consistent carbohydrate 2000 calorie, 1500 mL fluid restriction diet. RD intern visited pt at bedside. Pt reported having a good appetite PTA and at this time. Declined the need for extra supplements (Boost/ Glucerna) at this time, nor does pt consume at home. UBW PTA according to pt is around 230 lbs.     RD intern educated patient and pt family member on low sodium, general healthful diet r/t recent hospital diagnosis. Recommended a well-balanced diet with a variety of fresh foods, fruits and vegetables (5 cups/day), whole grains (3 oz/day), and fat-free or low-fat dairy. Discussed reading food packages, food labels, and nutrition facts labels to identify nutrient content of foods.    Discussed the importance of limiting sodium to less than 2,000 mg per day. Recommended salt free seasonings and other herbs and spices in meals to enhance flavor without additional sodium.    Discussed dietary sources of cholesterol, the importance of incorporating healthy fats into the diet, and avoiding saturated and trans fats for heart health. For a generally healthy diet, aim for total fat less than 25-35% of calories.    Discussed the importance of fiber (especially soluble fiber), dietary sources, and a goal intake of >20-30g/day.    Discussed the importance of carbohydrates in the diet, but with diabetes, focusing on consistent carb intake throughout the day with emphasis on  protein and fiber.    Discussed 1500 ml fluid restriction per MD and dietary sources of fluid. RD recommended using a cup with measurements for fluids and to try to consume small sips spread throughout the day rather than a lot at one time.    Pt expressed understanding and appreciation for nutrition education provided. Encouraged pt to read handouts and use RD contact info w/ any questions/concerns he may have. Pt is in contemplative state of change.     Nutrition Related Social Determinants of Health: SDOH: Adequate food in home environment    Visual NFPE performed, pt appears nourished.  All questions and concerns answered.  Provided handout with dietitian's contact information.  *Please re-consult as needed.  Thank you,  Christel Nguyen, DANIELLE Intern   Naheed Lewis, BS, RDN, LDN

## 2025-05-07 NOTE — PLAN OF CARE
[id number]/[id number] SB Whittaker is a 69 y.o.male admitted on 5/6/2025 for Acute on chronic combined systolic and diastolic congestive heart failure   Code Status: Full Code MRN: [id number]   Review of patient's allergies indicates:   Allergen Reactions    Lisinopril Other (See Comments)     Other reaction(s):  cough    Actos  [pioglitazone] Other (See Comments)     Other reaction(s): CHF    Metformin Other (See Comments)     Other reaction(s): renal insuff  Other reaction(s): CHF     Past Medical History:   Diagnosis Date    Acquired renal cyst of left kidney     Anemia associated with chronic renal failure     CAD (coronary artery disease)     nonobstructive c 9/14    CHF (congestive heart failure)     Chronic immunosuppression with Prograf and MMF 06/18/2015    Chronic venous insufficiency of lower extremity     CKD (chronic kidney disease) stage 3, GFR 30-59 ml/min     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 RNA NEGATIVE 5/2015, 2021, 2022    History of colon polyps     HPTH (hyperparathyroidism)     Hyperlipidemia     Hypertension associated with stage 3 chronic kidney disease due to type 2 diabetes mellitus     LBBB (left bundle branch block) 12/20/2021    Morbid obesity with BMI of 45.0-49.9, adult     Nephrolithiasis 6/7/2013    PCO (posterior capsular opacification), left 03/04/2019    Proteinuria     resolved s/p transplant    S/P kidney transplant     Sleep apnea     Type 2 diabetes, uncontrolled, with retinopathy     Type II diabetes mellitus with renal manifestations       PRN meds    acetaminophen, 650 mg, Q8H PRN  dextrose 50%, 12.5 g, PRN  dextrose 50%, 25 g,  PRN  glucagon (human recombinant), 1 mg, PRN  glucose, 16 g, PRN  glucose, 24 g, PRN  insulin aspart U-100, 0-5 Units, QID (AC + HS) PRN  ondansetron, 4 mg, Q12H PRN  prochlorperazine, 5 mg, Q6H PRN  sodium chloride 0.9%, 10 mL, PRN      Chart check completed. Will continue plan of care.      Orientation: oriented x 4  Tung Coma Scale Score: 15     Lead Monitored: Lead II Rhythm: normal sinus rhythm, conduction defect (BBB)    Cardiac/Telemetry Box Number: [id number]  VTE Core Measure: Pharmacological prophylaxis initiated/maintained Last Bowel Movement: 05/05/25  Diet Consistent Carbohydrate 2000 Calories (up to 75 gm per meal); Heart Healthy; Fluid - 1500mL  Voiding Characteristics: incontinence  Dewayne Score: 16  Fall Risk Score: 9  Accucheck [x]   Freq?      Lines/Drains/Airways       Peripheral Intravenous Line  Duration                  Hemodialysis AV Fistula Right upper arm -- days         Peripheral IV - Single Lumen 05/06/25 1312 20 G 1 1/4 in Left Antecubital <1 day

## 2025-05-07 NOTE — NURSING
Pt refused 0000 vitals & refuses position changes. Attempts at redirection/compliance unsuccessful at this time. JEFF Saunders notified & aware.

## 2025-05-07 NOTE — PROGRESS NOTES
[facility])  Nephrology  Progress Note    Patient Name: Mitch Whittaker  MRN: [id number]  Admission Date: 5/6/2025  Hospital Length of Stay: 1 days  Attending Provider: Ann Lazo MD   Primary Care Physician: Valery Caal MD  Principal Problem:Acute on chronic combined systolic and diastolic congestive heart failure    Subjective:     HPI: Thank you for referring the pt to us. H/o and chart were reviewed. Pt was seen and examined.  Patient is a 69-year-old male with a history of living related kidney transplant from his daughter in 2015, CKD stage 3 at baseline, DM-2, HTN, combined systolic (EF 40%) and diastolic CHF, and obesity who presented to ER with chest discomfort and shortness of breath.  Patient had seen me virtually as part of renal follow-up visit just 5 days ago.  He expressed some discomfort and SOB then but not to this extent.  He admits that he is worse now.  He has been drinking a lot of water and other liquids.  He says he is not restricting salt in his diet.  He has no other complaints.  No fever, no cough, no chest pain on exertion, just a feeling of fullness in his chest, he feels his legs are weaker than before, and he denies any abdominal pain or discomfort at the graft site.  He says he has been compliant with his medications.  He does not know his medicines.  His daughter normally dispenses them to him.      Interval History: Pt was seen and examined. Labs and meds reviewed. Discussed with other providers.  No new events.  Feels slightly better with less SOB.  Still feels puffy.    Review of patient's allergies indicates:   Allergen Reactions    Lisinopril Other (See Comments)     Other reaction(s):  cough    Actos  [pioglitazone] Other (See Comments)     Other reaction(s): CHF    Metformin Other (See Comments)     Other reaction(s): renal insuff  Other reaction(s): CHF     Current Facility-Administered Medications   Medication Frequency    0.9%  NaCl  infusion (for blood administration) Q24H PRN    acetaminophen tablet 650 mg Q8H PRN    allopurinol split tablet 50 mg Daily    apixaban tablet 5 mg BID    aspirin EC tablet 81 mg Daily    calcitRIOL capsule 0.25 mcg Daily    dextrose 50% injection 12.5 g PRN    dextrose 50% injection 25 g PRN    famotidine tablet 20 mg Every other day    furosemide (Lasix) 200 mg in 0.9% NaCl SolP 100 mL continuous infusion (conc: 2 mg/mL) Continuous    glucagon (human recombinant) injection 1 mg PRN    glucose chewable tablet 16 g PRN    glucose chewable tablet 24 g PRN    insulin aspart U-100 pen 0-5 Units QID (AC + HS) PRN    insulin glargine U-100 (Lantus) pen 5 Units BID    metoprolol succinate (TOPROL-XL) 24 hr tablet 25 mg Daily    mycophenolate capsule 500 mg BID    ondansetron injection 4 mg Q12H PRN    polyethylene glycol packet 17 g Daily    predniSONE tablet 5 mg Daily    prochlorperazine injection Soln 5 mg Q6H PRN    senna-docusate 8.6-50 mg per tablet 1 tablet BID    sodium chloride 0.9% flush 10 mL PRN    tacrolimus capsule 3 mg BID       Objective:     Vital Signs (Most Recent):  Temp: 96.2 °F (35.7 °C) (05/07/25 1256)  Pulse: 89 (05/07/25 1256)  Resp: 17 (05/07/25 1256)  BP: (!) 116/58 (05/07/25 1256)  SpO2: 96 % (05/07/25 1256) Vital Signs (24h Range):  Temp:  [96.2 °F (35.7 °C)-98.7 °F (37.1 °C)] 96.2 °F (35.7 °C)  Pulse:  [] 89  Resp:  [16-19] 17  SpO2:  [95 %-100 %] 96 %  BP: (102-147)/(55-74) 116/58     Weight: 94.9 kg (209 lb 3.5 oz) (05/07/25 0600)  Body mass index is 28.37 kg/m².  Body surface area is 2.2 meters squared.    I/O last 3 completed shifts:  In: 648.9 [P.O.:120; I.V.:28.9; IV Piggyback:500]  Out: -      Physical Exam  Vitals and nursing note reviewed.   Constitutional:       General: He is not in acute distress.     Appearance: Normal appearance. He is ill-appearing.   Cardiovascular:      Rate and Rhythm: Normal rate and regular rhythm.      Pulses: Normal pulses.      Heart sounds:  Normal heart sounds.   Pulmonary:      Effort: Pulmonary effort is normal.      Breath sounds: Rales present.   Abdominal:      Palpations: Abdomen is soft.      Tenderness: There is no abdominal tenderness.   Musculoskeletal:      Right lower leg: Edema present.      Left lower leg: Edema present.   Neurological:      Mental Status: He is alert and oriented to person, place, and time.   Psychiatric:         Behavior: Behavior normal.          Significant Labs:  Reviewed  BMP  Lab Results   Component Value Date     05/07/2025    K 5.0 05/07/2025     05/07/2025    CO2 23 05/07/2025    BUN 82 (H) 05/07/2025    CREATININE 5.7 (H) 05/07/2025    CALCIUM 9.3 05/07/2025    ANIONGAP 13 05/07/2025    EGFRNORACEVR 10 (L) 05/07/2025     Lab Results   Component Value Date    WBC 3.16 (L) 05/07/2025    HGB 6.9 (L) 05/07/2025    HCT 25.0 (L) 05/07/2025    MCV 83 05/07/2025     05/07/2025     Recent Labs   Lab 05/06/25  1628   TROPONINI 0.062*     Lab Results   Component Value Date    UIBC 263 03/30/2010    IRON 24 (L) 12/26/2024    TRANSFERRIN 165 (L) 12/26/2024    TIBC 244 (L) 12/26/2024    FESATURATED 10 (L) 12/26/2024      Lab Results   Component Value Date    .2 (H) 03/18/2025    CALCIUM 9.3 05/07/2025    CAION 1.35 01/24/2025    PHOS 5.4 (H) 05/07/2025       Serum albumin 1.4    Prograf level pending      Significant Imaging:  She chest x-ray      Assessment/Plan:     69-year-old male with history of kidney transplant presented with volume overload:     ANGELITO on CKD 4  Presented with ANGELITO on CKD stage 3 or 4.  Baseline stage difficult to say because of s Cr fluctuations  ANGELITO likely related to CHF exacerbation  Prerenal azotemia  S Cr stable, not worse  History of DM and hypertension  Was on HD x 2.5 years before the transplant    Complications of CKD:  K normal  Na normal  Metabolic acidosis, mild  Ca normal  PO4, normal  Secondary hyperparathyroidism, mild  Anemia, normocytic.  Iron sat was low  recently.  Will provide Epogen and IV iron replacement.  Correcting anemia will probably lead to symptomatic relief as Hgb is quite low  Recommend also PRBC transfusion     Patient is fluid overloaded  Insufficient response to diuretics  Will increase Lasix IV drip from 5 to 10 mg/hour  Will give metolazone 5 mg p.o. x1 and p.r.n.   Okay to give diuretics but the dose has to be proportional to the amount of fluid gain  Okay to give diuretics even in light of s Cr fluctuations, if symptomatic relief is the objective  Strict I's and O's  Dietitian consult will be helpful     Immunosuppression  History of living related kidney transplant from daughter in 2015  He is immunosuppressed on antirejection medications  Continue the following   Prograf 3 mg p.o. b.i.d.  Mycophenolate 500 mg p.o. b.i.d.  Prednisone 5 mg q.d.  Trough Prograf level is pending today    Note that the very low serum albumin.  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  Low-salt intake.  Low fluid intake  Diuretic management as above            Plans and recommendations:  As detailed above  Total time spent 40 minutes including time needed to review the records, the   patient evaluation, documentation, face-to-face discussion with the patient,   more than 50% of the time was spent on coordination of care and counseling.    Level V visit.           Thank you for your consult.     Hany Gonsalez,  MD  Nephrology  [facility] ([facility])

## 2025-05-07 NOTE — SUBJECTIVE & OBJECTIVE
Interval History:  Patient seen and examined at bedside.  Continues to report pain all over.  Hemoglobin down this morning we will be transfused with 1 unit of packed blood cells.    Review of Systems   Constitutional:  Positive for activity change, appetite change and fatigue.   Respiratory:  Negative for cough and shortness of breath.    Cardiovascular:  Positive for leg swelling. Negative for chest pain and palpitations.   Gastrointestinal:  Positive for abdominal pain. Negative for blood in stool, constipation, diarrhea, nausea, rectal pain and vomiting.   Musculoskeletal:  Positive for arthralgias and joint swelling.   Neurological:  Positive for weakness.   All other systems reviewed and are negative.    Objective:     Vital Signs (Most Recent):  Temp: 96 °F (35.6 °C) (05/07/25 1620)  Pulse: 84 (05/07/25 1620)  Resp: 18 (05/07/25 1620)  BP: 138/60 (05/07/25 1620)  SpO2: 97 % (05/07/25 1620) Vital Signs (24h Range):  Temp:  [96 °F (35.6 °C)-98.7 °F (37.1 °C)] 96 °F (35.6 °C)  Pulse:  [73-92] 84  Resp:  [16-19] 18  SpO2:  [95 %-98 %] 97 %  BP: (102-147)/(55-70) 138/60     Weight: 94.9 kg (209 lb 3.5 oz)  Body mass index is 28.37 kg/m².    Intake/Output Summary (Last 24 hours) at 5/7/2025 1756  Last data filed at 5/7/2025 1659  Gross per 24 hour   Intake 448.85 ml   Output 600 ml   Net -151.15 ml         Physical Exam  Vitals reviewed.   Constitutional:       Appearance: He is obese. He is ill-appearing.   HENT:      Head: Normocephalic and atraumatic.      Mouth/Throat:      Mouth: Mucous membranes are moist.      Pharynx: Oropharynx is clear.   Eyes:      Extraocular Movements: Extraocular movements intact.      Conjunctiva/sclera: Conjunctivae normal.   Cardiovascular:      Rate and Rhythm: Normal rate and regular rhythm.      Pulses: Normal pulses.      Heart sounds: Murmur heard.   Pulmonary:      Effort: Pulmonary effort is normal.      Breath sounds: Normal breath sounds.   Abdominal:      General: Bowel  sounds are normal.      Palpations: Abdomen is soft.      Tenderness: There is abdominal tenderness. There is no guarding or rebound.   Musculoskeletal:         General: Swelling, tenderness and deformity present.      Cervical back: Normal range of motion and neck supple.   Skin:     General: Skin is warm and dry.      Findings: Lesion (Chronic venous stasis changes bilaterally to lower extremities with healed ulcers) present.   Neurological:      Mental Status: He is alert and oriented to person, place, and time. Mental status is at baseline.      Motor: Weakness present.             Significant Labs: All pertinent labs within the past 24 hours have been reviewed.  CBC:   Recent Labs   Lab 05/06/25  1312 05/07/25  0417   WBC 3.40* 3.16*   HGB 7.3* 6.9*   HCT 25.1* 25.0*    387     CMP:   Recent Labs   Lab 05/06/25  1312 05/07/25  0417    136   K 4.9 5.0   CL 98 100   CO2 24 23   * 126*   BUN 79* 82*   CREATININE 5.7* 5.7*   CALCIUM 9.2 9.3   PROT 6.5 6.2   ALBUMIN 1.5* 1.4*   BILITOT 0.4 0.4   ALKPHOS 230* 238*   AST 42 41   ALT 27 30   ANIONGAP 14 13     Cardiac Markers:   Recent Labs   Lab 05/06/25  1328   *     Lipase:   Recent Labs   Lab 05/06/25  1312   LIPASE 16     Magnesium:   Recent Labs   Lab 05/07/25  0417   MG 1.7     Troponin:   Recent Labs   Lab 05/06/25  1312 05/06/25  1628   TROPONINI 0.067* 0.062*     TSH:   Recent Labs   Lab 05/06/25  1628   TSH 0.721         Significant Imaging: I have reviewed all pertinent imaging results/findings within the past 24 hours.    Ultrasound Doppler of transplant and kidney  Transplant kidney is 11 cm in length.  Negative for hydronephrosis.  Normal cortical echogenicity.  No calculi.     Nondilated peak systolic flow velocities within the kidney between 17 and 25 centimeters/second.  Resistive indices are between 0.66 and 0.81, overall slightly diminished from comparison.  Main renal arterial velocity 179 centimeters/second.  The renal  arterial/iliac arterial ratio is 1.3.     Main renal vein is patent.

## 2025-05-07 NOTE — PROGRESS NOTES
"[facility]) nurse navigator notified of HFTCC candidate in need of education and introduction to 4-6 week program.      PT aao x 3 while lying in bed. Introduced self to pt as HFTCC nurse navigator.     Patient given "Home Care Guide for Heart Failure Patients" , "[facility] Failure [facility]itional [facility]" flyer and "Daily weight and symptom tracker".  Encouraged pt and caregiver to review information.      Reviewed the following key points of HFTCC program with pt and family:   1.) Take your medications as directed.    2.) Weight yourself daily   3.) Follow low salt and limited fluid diet.    4.) Stop smoking and start exercising   5.) Go to your appointments and call your team.      Pt reminded to follow Symptom tracker and to call at the onset of symptoms according to tracker.     Reviewed plan for follow up once discharged to include phone calls, in person and virtual visits to assist pt optimizing their heart failure medication regimen and encouraging healthy lifestyle modifications.  Reminded pt that program will assist them over the next 4-6 weeks and then patient will be transferred to long term care provider .  Reminded pt how to contact HFTCC navigator via phone and or via [email or web address].     Pt given appointment or instructed appointment will be printed on hospital discharge paperwork.     Pt also reminded HF nurse will call 48-72 hours after discharge to check on them.     PT and wife verbalize read back of information given.  Encouraged pt and family to read over information often and contact team with any questions or concerns.      "

## 2025-05-07 NOTE — ASSESSMENT & PLAN NOTE
69-year-old male with history of kidney transplant presented with volume overload:     ANGELITO on CKD 4  Patient has ANGELITO on CKD stage 3 or 4.  Baseline stage difficult to say because of s Cr fluctuations  ANGELITO likely related to CHF exacerbation  Prerenal azotemia  History of DM and hypertension  Was on HD x 2.5 years before the transplant  K normal  Na normal     Currently patient is fluid overloaded and is experiencing dyspnea  Okay to give diuretics but the dose has to be proportional to the amount of fluid gain  Strict I's and O's  Dietitian consult will be helpful     Immunosuppression  History of living related kidney transplant from daughter in 2015  He is immunosuppressed on antirejection medications  Reordered the following:  Prograf 3 mg p.o. b.i.d.  Mycophenolate 500 mg p.o. b.i.d.  Prednisone 5 mg q.d.  Trough Prograf level was ordered for a.m.     Of note previously had leukopenia  Resolved after holding mycophenolate for a while  WBCs still is slightly low     Chronic combined systolic and diastolic congestive heart failure  Presents with fluid overload, pulmonary edema  Reports dietary indiscretion with salty foods and excess water intake  Dietary advice was provided  We will need diuretics to to achieve symptomatic relief from dyspnea     Following is recommended:  Lasix 5-10 mg/hour IV infusion  Plus metolazone as needed 5-10 mg per dose  Moderate fluid restriction           Plans and recommendations:  As detailed above  Total time spent 70 minutes including time needed to review the records, the   patient evaluation, documentation, face-to-face discussion with the patient,   more than 50% of the time was spent on coordination of care and counseling.    Level V visit.

## 2025-05-07 NOTE — PLAN OF CARE
Patient declined position changes despite several attempts at redirection.    Problem: Adult Inpatient Plan of Care  Goal: Absence of Hospital-Acquired Illness or Injury  Outcome: Progressing     Problem: Adult Inpatient Plan of Care  Goal: Optimal Comfort and Wellbeing  Outcome: Progressing     Problem: Adult Inpatient Plan of Care  Goal: Readiness for Transition of Care  Outcome: Progressing     Problem: Diabetes Comorbidity  Goal: Blood Glucose Level Within Targeted Range  Outcome: Progressing

## 2025-05-07 NOTE — ASSESSMENT & PLAN NOTE
ANGELITO is likely due to CHF Baseline creatinine is 1.9-2.8. Most recent creatinine and eGFR are listed below.  Recent Labs     05/06/25  1312 05/07/25  0417   CREATININE 5.7* 5.7*   EGFRNORACEVR 10* 10*      Plan  - ANGELITO is worsening. Will continue current treatment  - Avoid nephrotoxins and renally dose meds for GFR listed above  - Monitor urine output, serial BMP, and adjust therapy as needed  - nephrology recommended Lasix drip   Urinalysis with trace protein trace blood, urine protein creatinine ratio 0.49

## 2025-05-07 NOTE — ASSESSMENT & PLAN NOTE
Patient's FSGs are controlled on current medication regimen.  Last A1c reviewed-   Lab Results   Component Value Date    HGBA1C 6.3 (H) 05/07/2025     Most recent fingerstick glucose reviewed-   Recent Labs   Lab 05/06/25 2054 05/07/25  0644 05/07/25  1200   POCTGLUCOSE 218* 112* 202*     Current correctional scale  Low  Maintain anti-hyperglycemic dose as follows-   Antihyperglycemics (From admission, onward)      Start     Stop Route Frequency Ordered    05/06/25 2100  insulin glargine U-100 (Lantus) pen 5 Units         -- SubQ 2 times daily 05/06/25 1644    05/06/25 1813  insulin aspart U-100 pen 0-5 Units         -- SubQ Before meals & nightly PRN 05/06/25 1713          Hold Oral hypoglycemics while patient is in the hospital.    Cont Lantus- titrate

## 2025-05-07 NOTE — PLAN OF CARE
[facility] ([facility])  Initial Discharge Assessment       Primary Care Provider: Valery Caal MD    Admission Diagnosis: Primary hypertension [I10]  Systolic and diastolic CHF, acute on chronic [I50.43]  Chest pain [R07.9]    Admission Date: 5/6/2025  Expected Discharge Date:     Transition of Care Barriers: None    Payor: BLUE CROSS BLUE SHIELD / Plan: [place] PPO / Product Type: PPO /     Extended Emergency Contact Information  Primary Emergency Contact: Edward Handley   [place]  Mobile Phone: [phone]  Relation: Daughter  Secondary Emergency Contact: Marybeth Handley  Address: [address], [place] [id number] [place] [address]  Home Phone: [phone]  Relation: Daughter    Discharge Plan A: Home Health         [facility]  [id number] [place] [address] [address]  [facility] [address]  Phone: [phone] Fax: [phone]      Initial Assessment (most recent)       Adult Discharge Assessment - 05/07/25 0846          Discharge Assessment    Assessment Type Discharge Planning Assessment     Confirmed/corrected address, phone number and insurance Yes     Confirmed Demographics Correct on Facesheet     Source of Information patient     Communicated GRETEL with patient/caregiver Date not available/Unable to determine     Reason For Admission CHF     People in Home spouse     Facility Arrived From: Home     Do you expect to return to your current living situation? Yes     Do you have help at home or someone to help you manage your care at home? Yes     Who are your caregiver(s) and their phone number(s)? Spouse and daughter     Prior to hospitilization cognitive status: Alert/Oriented     Current cognitive status: Alert/Oriented     Walking or Climbing Stairs Difficulty yes     Walking or Climbing Stairs ambulation difficulty, requires equipment     Dressing/Bathing Difficulty no     Equipment Currently Used at Home walker, rolling;slide board;hospital  bed;lift device;bedside commode     Readmission within 30 days? No     Patient currently being followed by outpatient case management? No     Do you currently have service(s) that help you manage your care at home? Yes     Name and Contact number of agency [facility]     Is the pt/caregiver preference to resume services with current agency Yes     Do you take prescription medications? Yes     Do you have prescription coverage? Yes     Coverage BCBS     Do you have any problems affording any of your prescribed medications? TBD     Is the patient taking medications as prescribed? yes     Who is going to help you get home at discharge? Spouse     How do you get to doctors appointments? health plan transportation;family or friend will provide     Are you on dialysis? No     Do you take coumadin? No     Discharge Plan A Home Health     DME Needed Upon Discharge  none     Discharge Plan discussed with: Patient     Transition of Care Barriers None                   Anticipated DC disposition: home with home health    Prior level of function: dependent     PCP: Valery Caal MD    Comments: CM confirmed demographics, emergency contacts, PCP and insurance. Needs will depend on hospital progress; CM following for needs.    Patient is current with [facility].

## 2025-05-07 NOTE — PROGRESS NOTES
"[place] [facility] ([facility]  Progress Note    Patient Name: Mitch Whittaker  MRN: [id number]  Patient Class: IP- Inpatient   Admission Date: 5/6/2025  Length of Stay: 1 days  Attending Physician: Ann Lazo MD  Primary Care Provider: Valery Caal MD        Subjective     Principal Problem:Acute on chronic combined systolic and diastolic congestive heart failure        HPI:  The patient is a 68yo male with CAD, Combined CHF EF 35 - 40%, s/p Renal transplant 2015, Chronic immunosuppression, CKD3, DM, Anemia, HLD, HTN, Hx DVT- on Eliquis, Gout, Chronic venous stasis and recurrent skin infections, and Morbid obesity who presented to the ED with abdominal pain. Pt reports lower back pain radiating to his abdomen describes as an aching pain rated 8/10 that has occurred on/off for the last 3 days. Pt denies fever, chills, chest pain, SOB, cough, N/V, Diarrhea, or constipation. He reports a normal BM yesterday. Pt also complains of worsening weakness and pain to arms and legs which he attributs to gout. He reports pain is worse to his hands, left knee, and ankles. He reports his left knee has been swollen "for awhile". He reports chronic swelling to BLE. Pt is bed bound at home and lives with his wife and daughters who are his caretakers.   Pt reports abdominal pain has resolved since arrival to ED     In the ED, Afebrile, VSS, oxygenation normal. Labs revealed mild leukopenia with WBC 3.4, Hgb 7.3, BUN 79, sCr 5.7 (baseline 1.9-2.8), Albumin 1.5, , Trop 0.067>0.062. EKG showed sinus tachycardia -rate 108, PACs, nonspecific intraventricular block   CT chest/abd/pelvis showed Small left pleural effusion, Nonspecific distention of the gallbladder, No gallbladder wall thickening or bile duct dilatation, Fatty liver, Native kidneys are small and atrophic, No hydronephrosis, No ureteral stones, Right renal transplant.    Ed provider discussed care with nephrologist, Dr. Gonsalez who " recommended IV Lasix drip plus metolazone as needed 5-10 mg per dose     Overview/Hospital Course:  Patient is a 69-year-old male with a history of coronary artery disease, biventricular CHF with an EF of 35-40%, status post living related donor kidney transplant 2015 on chronic immunosuppression, ANGELITO on CKD 3, diabetes mellitus, anemia, hypertension, history of DVT on Eliquis, gout, chronic venous stasis, morbid obesity.  Patient presented to the emergency department complaining of back pain radiating to his abdomen 8/10.  He reports it is has been occurring for approximately 3 days prior to admission.  Health who complains of generalized body ache worse pain in his legs and knees.  He states this has been going on for quite some time.  Emergency department labs revealed a white count of 3.4 hemoglobin 7.3 creatinine of 5.7 which is up from about 3.  Albumin is 1.5.  CT scan chest abdomen and pelvis revealed a small left pleural effusion otherwise none specific.    Patient was seen by his nephrologist Dr. Gonsalez who initiated Lasix drip with metolazone.      Interval History:  Patient seen and examined at bedside.  Continues to report pain all over.  Hemoglobin down this morning we will be transfused with 1 unit of packed blood cells.    Review of Systems   Constitutional:  Positive for activity change, appetite change and fatigue.   Respiratory:  Negative for cough and shortness of breath.    Cardiovascular:  Positive for leg swelling. Negative for chest pain and palpitations.   Gastrointestinal:  Positive for abdominal pain. Negative for blood in stool, constipation, diarrhea, nausea, rectal pain and vomiting.   Musculoskeletal:  Positive for arthralgias and joint swelling.   Neurological:  Positive for weakness.   All other systems reviewed and are negative.    Objective:     Vital Signs (Most Recent):  Temp: 96 °F (35.6 °C) (05/07/25 1620)  Pulse: 84 (05/07/25 1620)  Resp: 18 (05/07/25 1620)  BP: 138/60  (05/07/25 1620)  SpO2: 97 % (05/07/25 1620) Vital Signs (24h Range):  Temp:  [96 °F (35.6 °C)-98.7 °F (37.1 °C)] 96 °F (35.6 °C)  Pulse:  [73-92] 84  Resp:  [16-19] 18  SpO2:  [95 %-98 %] 97 %  BP: (102-147)/(55-70) 138/60     Weight: 94.9 kg (209 lb 3.5 oz)  Body mass index is 28.37 kg/m².    Intake/Output Summary (Last 24 hours) at 5/7/2025 1756  Last data filed at 5/7/2025 1659  Gross per 24 hour   Intake 448.85 ml   Output 600 ml   Net -151.15 ml         Physical Exam  Vitals reviewed.   Constitutional:       Appearance: He is obese. He is ill-appearing.   HENT:      Head: Normocephalic and atraumatic.      Mouth/Throat:      Mouth: Mucous membranes are moist.      Pharynx: Oropharynx is clear.   Eyes:      Extraocular Movements: Extraocular movements intact.      Conjunctiva/sclera: Conjunctivae normal.   Cardiovascular:      Rate and Rhythm: Normal rate and regular rhythm.      Pulses: Normal pulses.      Heart sounds: Murmur heard.   Pulmonary:      Effort: Pulmonary effort is normal.      Breath sounds: Normal breath sounds.   Abdominal:      General: Bowel sounds are normal.      Palpations: Abdomen is soft.      Tenderness: There is abdominal tenderness. There is no guarding or rebound.   Musculoskeletal:         General: Swelling, tenderness and deformity present.      Cervical back: Normal range of motion and neck supple.   Skin:     General: Skin is warm and dry.      Findings: Lesion (Chronic venous stasis changes bilaterally to lower extremities with healed ulcers) present.   Neurological:      Mental Status: He is alert and oriented to person, place, and time. Mental status is at baseline.      Motor: Weakness present.             Significant Labs: All pertinent labs within the past 24 hours have been reviewed.  CBC:   Recent Labs   Lab 05/06/25  1312 05/07/25  0417   WBC 3.40* 3.16*   HGB 7.3* 6.9*   HCT 25.1* 25.0*    387     CMP:   Recent Labs   Lab 05/06/25  1312 05/07/25  0417     136   K 4.9 5.0   CL 98 100   CO2 24 23   * 126*   BUN 79* 82*   CREATININE 5.7* 5.7*   CALCIUM 9.2 9.3   PROT 6.5 6.2   ALBUMIN 1.5* 1.4*   BILITOT 0.4 0.4   ALKPHOS 230* 238*   AST 42 41   ALT 27 30   ANIONGAP 14 13     Cardiac Markers:   Recent Labs   Lab 05/06/25  1328   *     Lipase:   Recent Labs   Lab 05/06/25  1312   LIPASE 16     Magnesium:   Recent Labs   Lab 05/07/25  0417   MG 1.7     Troponin:   Recent Labs   Lab 05/06/25  1312 05/06/25  1628   TROPONINI 0.067* 0.062*     TSH:   Recent Labs   Lab 05/06/25  1628   TSH 0.721         Significant Imaging: I have reviewed all pertinent imaging results/findings within the past 24 hours.    Ultrasound Doppler of transplant and kidney  Transplant kidney is 11 cm in length.  Negative for hydronephrosis.  Normal cortical echogenicity.  No calculi.     Nondilated peak systolic flow velocities within the kidney between 17 and 25 centimeters/second.  Resistive indices are between 0.66 and 0.81, overall slightly diminished from comparison.  Main renal arterial velocity 179 centimeters/second.  The renal arterial/iliac arterial ratio is 1.3.     Main renal vein is patent.         Assessment & Plan  Acute on chronic combined systolic and diastolic congestive heart failure  Patient has Combined Systolic and Diastolic heart failure that is Acute on chronic. On presentation their CHF was decompensated. Evidence of decompensated CHF on presentation includes: edema, elevated JVD, and worsening renal function. The etiology of their decompensation is likely dietary indiscretion and increased fluid intake. Most recent BNP and echo results are listed below.  Recent Labs     05/06/25  1328   *     Latest ECHO  Results for orders placed during the hospital encounter of 03/13/25    Echo Saline Bubble? No    Interpretation Summary    Left Ventricle: The left ventricle is normal in size. Mildly increased ventricular mass. Mildly increased wall thickness. There  is mild concentric hypertrophy. Normal wall motion. Septal motion is consistent with bundle branch block. There is moderately reduced systolic function with a visually estimated ejection fraction of 35 - 40%. Grade I diastolic dysfunction.    Right Ventricle: The right ventricle is normal in size. Wall thickness is normal. Systolic function is normal.    Left Atrium: Mildly dilated    Aorta: Aortic annulus is mildly dilated measuring 4.01 cm. Ascending aorta is normal measuring 3.69 cm.    Pulmonary Artery: The estimated pulmonary artery systolic pressure is 24 mmHg.    IVC/SVC: Normal venous pressure at 3 mmHg.    Current Heart Failure Medications  furosemide (Lasix) 200 mg in 0.9% NaCl SolP 100 mL continuous infusion (conc: 2 mg/mL), Continuous, Intravenous  metoprolol succinate (TOPROL-XL) 24 hr tablet 25 mg, Daily, Oral    Plan  - Monitor strict I&Os and daily weights.    - Place on telemetry  - Low sodium diet  - Place on fluid restriction of 1.5 L.   - Cardiology has not been consulted  - The patient's volume status is worsening as indicated by edema and elevated JVD. Will continue current treatment  - Nephrology recommended lasix drip   CHF pathway initiated       ANGELITO on CKD 4  ANGELITO is likely due to CHF Baseline creatinine is 1.9-2.8. Most recent creatinine and eGFR are listed below.  Recent Labs     05/06/25  1312 05/07/25  0417   CREATININE 5.7* 5.7*   EGFRNORACEVR 10* 10*      Plan  - ANGELITO is worsening. Will continue current treatment  - Avoid nephrotoxins and renally dose meds for GFR listed above  - Monitor urine output, serial BMP, and adjust therapy as needed  - nephrology recommended Lasix drip   Urinalysis with trace protein trace blood, urine protein creatinine ratio 0.49  Obstructive sleep apnea  CPAP hs    Coronary artery disease of native artery of native heart with stable angina pectoris  Patient with known non obstructive CAD , which is controlled Will continue BB, ASA, and Statin and monitor for  S/Sx of angina/ACS. Continue to monitor on telemetry.   Living-related donor kidney transplant (daughter) - 6/12/15  Nephrology following   Continue Prograf, Prednisone, Cellcept     Secondary hyperparathyroidism of renal origin  Stable   Cont Calcitriol     Type II diabetes mellitus with renal manifestations  Patient's FSGs are controlled on current medication regimen.  Last A1c reviewed-   Lab Results   Component Value Date    HGBA1C 6.3 (H) 05/07/2025     Most recent fingerstick glucose reviewed-   Recent Labs   Lab 05/06/25 2054 05/07/25  0644 05/07/25  1200   POCTGLUCOSE 218* 112* 202*     Current correctional scale  Low  Maintain anti-hyperglycemic dose as follows-   Antihyperglycemics (From admission, onward)      Start     Stop Route Frequency Ordered    05/06/25 2100  insulin glargine U-100 (Lantus) pen 5 Units         -- SubQ 2 times daily 05/06/25 1644    05/06/25 1813  insulin aspart U-100 pen 0-5 Units         -- SubQ Before meals & nightly PRN 05/06/25 1713          Hold Oral hypoglycemics while patient is in the hospital.    Cont Lantus- titrate   Hypertension associated with stage 3 chronic kidney disease due to type 2 diabetes mellitus  Patients blood pressure range in the last 24 hours was: BP  Min: 102/55  Max: 147/69.The patient's inpatient anti-hypertensive regimen is listed below:  Current Antihypertensives  furosemide (Lasix) 200 mg in 0.9% NaCl SolP 100 mL continuous infusion (conc: 2 mg/mL), Continuous, Intravenous  metoprolol succinate (TOPROL-XL) 24 hr tablet 25 mg, Daily, Oral    Plan  - BP is controlled, no changes needed to their regimen     Debility  Patient with Chronic debility due to functional quadraplegia, bed confinement status, and chronic unspecified fatigue. The patient's latest AMPAC (Activity Measure for Post Acute Care) Score is listed below.    AM-PAC Score - How much help does the patient need for each activity listed  Basic Mobility Total Score: 7  Turning over in bed  (including adjusting bedclothes, sheets and blankets)?: Unable  Sitting down on and standing up from a chair with arms (e.g., wheelchair, bedside commode, etc.): Unable  Moving from lying on back to sitting on the side of the bed?: Unable  Moving to and from a bed to a chair (including a wheelchair)?: A lot  Need to walk in hospital room?: Unable  Climbing 3-5 steps with a railing?: Unable    Plan  - Progressive mobility protocol initated  - Fall precautions in place          Gout  Check uric acid, cont Allopurinol     History of DVT (deep vein thrombosis)  Stable   Cont Eliquis     Immunosuppression  Nephrology recommended continuing pt's home medications Prograf, Cellcept, and Prednisone     Arthralgia  Uric acid 11.5  Markedly elevated inflammatory markers    VTE Risk Mitigation (From admission, onward)           Ordered     apixaban tablet 5 mg  2 times daily         05/06/25 1644     IP VTE HIGH RISK PATIENT  Once         05/06/25 1644     Place sequential compression device  Until discontinued         05/06/25 1644                    Discharge Planning   GRETEL: 5/12/2025     Code Status: Full Code   Medical Readiness for Discharge Date:   Discharge Plan A: Home Health                        Ann Richard MD  Department of Hospital Medicine   [place] - Telemetry ([facility])

## 2025-05-07 NOTE — ASSESSMENT & PLAN NOTE
Patients blood pressure range in the last 24 hours was: BP  Min: 102/55  Max: 147/69.The patient's inpatient anti-hypertensive regimen is listed below:  Current Antihypertensives  furosemide (Lasix) 200 mg in 0.9% NaCl SolP 100 mL continuous infusion (conc: 2 mg/mL), Continuous, Intravenous  metoprolol succinate (TOPROL-XL) 24 hr tablet 25 mg, Daily, Oral    Plan  - BP is controlled, no changes needed to their regimen

## 2025-05-08 LAB
ABSOLUTE NEUTROPHIL MANUAL (OHS): 2 K/UL
ALBUMIN SERPL BCP-MCNC: 1.5 G/DL (ref 3.5–5.2)
ALP SERPL-CCNC: 237 UNIT/L (ref 40–150)
ALT SERPL W/O P-5'-P-CCNC: 30 UNIT/L (ref 10–44)
ANION GAP (OHS): 13 MMOL/L (ref 8–16)
AORTIC ROOT ANNULUS: 4 CM
AORTIC SIZE INDEX: 1.5 CM/M2
ASCENDING AORTA: 3.3 CM
AST SERPL-CCNC: 32 UNIT/L (ref 11–45)
AV INDEX (PROSTH): 0.66
AV MEAN GRADIENT: 3 MMHG
AV PEAK GRADIENT: 5 MMHG
AV VALVE AREA BY VELOCITY RATIO: 3.7 CM²
AV VALVE AREA: 3 CM²
AV VELOCITY RATIO: 0.82
BILIRUB SERPL-MCNC: 0.4 MG/DL (ref 0.1–1)
BSA FOR ECHO PROCEDURE: 2.32 M2
BUN SERPL-MCNC: 83 MG/DL (ref 8–23)
CALCIUM SERPL-MCNC: 9.3 MG/DL (ref 8.7–10.5)
CHLORIDE SERPL-SCNC: 100 MMOL/L (ref 95–110)
CO2 SERPL-SCNC: 24 MMOL/L (ref 23–29)
CREAT SERPL-MCNC: 5.8 MG/DL (ref 0.5–1.4)
CV ECHO LV RWT: 0.56 CM
DOP CALC AO PEAK VEL: 1.1 M/S
DOP CALC AO VTI: 20.7 CM
DOP CALC LVOT AREA: 4.5 CM2
DOP CALC LVOT DIAMETER: 2.4 CM
DOP CALC LVOT PEAK VEL: 0.9 M/S
DOP CALC LVOT STROKE VOLUME: 61.5 CM3
DOP CALC RVOT PEAK VEL: 0.81 M/S
DOP CALC RVOT VTI: 14.4 CM
DOP CALCLVOT PEAK VEL VTI: 13.6 CM
ECHO LV POSTERIOR WALL: 1.5 CM (ref 0.6–1.1)
EOSINOPHIL NFR BLD MANUAL: 5 % (ref 0–8)
ERYTHROCYTE [DISTWIDTH] IN BLOOD BY AUTOMATED COUNT: 16.7 % (ref 11.5–14.5)
FRACTIONAL SHORTENING: 13 % (ref 28–44)
GFR SERPLBLD CREATININE-BSD FMLA CKD-EPI: 10 ML/MIN/1.73/M2
GLUCOSE SERPL-MCNC: 138 MG/DL (ref 70–110)
HCT VFR BLD AUTO: 29.4 % (ref 40–54)
HGB BLD-MCNC: 8.7 GM/DL (ref 14–18)
INTERVENTRICULAR SEPTUM: 1.6 CM (ref 0.6–1.1)
IVRT: 99 MSEC
LA MAJOR: 6.2 CM
LA MINOR: 5.5 CM
LA WIDTH: 3 CM
LEFT ATRIUM AREA SYSTOLIC (APICAL 2 CHAMBER): 25.12 CM2
LEFT ATRIUM AREA SYSTOLIC (APICAL 4 CHAMBER): 30.31 CM2
LEFT ATRIUM SIZE: 3.8 CM
LEFT ATRIUM VOLUME INDEX MOD: 44 ML/M2
LEFT ATRIUM VOLUME INDEX: 25 ML/M2
LEFT ATRIUM VOLUME MOD: 99 ML
LEFT ATRIUM VOLUME: 56 CM3
LEFT INTERNAL DIMENSION IN SYSTOLE: 4.7 CM (ref 2.1–4)
LEFT VENTRICLE DIASTOLIC VOLUME INDEX: 61.67 ML/M2
LEFT VENTRICLE DIASTOLIC VOLUME: 140 ML
LEFT VENTRICLE END SYSTOLIC VOLUME APICAL 2 CHAMBER: 80.43 ML
LEFT VENTRICLE END SYSTOLIC VOLUME APICAL 4 CHAMBER: 112.57 ML
LEFT VENTRICLE MASS INDEX: 167.6 G/M2
LEFT VENTRICLE SYSTOLIC VOLUME INDEX: 44.1 ML/M2
LEFT VENTRICLE SYSTOLIC VOLUME: 100 ML
LEFT VENTRICULAR INTERNAL DIMENSION IN DIASTOLE: 5.4 CM (ref 3.5–6)
LEFT VENTRICULAR MASS: 380.5 G
LVED V (TEICH): 140.28 ML
LVES V (TEICH): 99.79 ML
LVOT MG: 1.61 MMHG
LVOT MV: 0.6 CM/S
LYMPHOCYTES NFR BLD MANUAL: 29 % (ref 18–48)
MAGNESIUM SERPL-MCNC: 1.6 MG/DL (ref 1.6–2.6)
MCH RBC QN AUTO: 23.8 PG (ref 27–31)
MCHC RBC AUTO-ENTMCNC: 29.6 G/DL (ref 32–36)
MCV RBC AUTO: 81 FL (ref 82–98)
MONOCYTES NFR BLD MANUAL: 6 % (ref 4–15)
NEUTROPHILS NFR BLD MANUAL: 60 % (ref 38–73)
NUCLEATED RBC (/100WBC) (OHS): 0 /100 WBC
PHOSPHATE SERPL-MCNC: 4.8 MG/DL (ref 2.7–4.5)
PISA TR MAX VEL: 2.5 M/S
PLATELET # BLD AUTO: 409 K/UL (ref 150–450)
PMV BLD AUTO: 9.6 FL (ref 9.2–12.9)
POCT GLUCOSE: 120 MG/DL (ref 70–110)
POCT GLUCOSE: 131 MG/DL (ref 70–110)
POCT GLUCOSE: 170 MG/DL (ref 70–110)
POCT GLUCOSE: 191 MG/DL (ref 70–110)
POCT GLUCOSE: 230 MG/DL (ref 70–110)
POTASSIUM SERPL-SCNC: 4.7 MMOL/L (ref 3.5–5.1)
PROT SERPL-MCNC: 6.3 GM/DL (ref 6–8.4)
PV MEAN GRADIENT: 1 MMHG
RA MAJOR: 4.35 CM
RA WIDTH: 4.1 CM
RBC # BLD AUTO: 3.65 M/UL (ref 4.6–6.2)
RV TISSUE DOPPLER FREE WALL SYSTOLIC VELOCITY 1 (APICAL 4 CHAMBER VIEW): 19.92 CM/S
SODIUM SERPL-SCNC: 137 MMOL/L (ref 136–145)
STJ: 3.5 CM
TDI LATERAL: 0.08 M/S
TDI SEPTAL: 0.12 M/S
TDI: 0.1 M/S
TR MAX PG: 25 MMHG
TRICUSPID ANNULAR PLANE SYSTOLIC EXCURSION: 1.7 CM
WBC # BLD AUTO: 3.28 K/UL (ref 3.9–12.7)
Z-SCORE OF LEFT VENTRICULAR DIMENSION IN END DIASTOLE: -4.46
Z-SCORE OF LEFT VENTRICULAR DIMENSION IN END SYSTOLE: -0.65

## 2025-05-08 PROCEDURE — 27000190 HC CPAP FULL FACE MASK W/VALVE

## 2025-05-08 PROCEDURE — 84100 ASSAY OF PHOSPHORUS: CPT | Performed by: NURSE PRACTITIONER

## 2025-05-08 PROCEDURE — 99900035 HC TECH TIME PER 15 MIN (STAT)

## 2025-05-08 PROCEDURE — 63600175 PHARM REV CODE 636 W HCPCS: Performed by: INTERNAL MEDICINE

## 2025-05-08 PROCEDURE — 25000003 PHARM REV CODE 250: Performed by: INTERNAL MEDICINE

## 2025-05-08 PROCEDURE — 97163 PT EVAL HIGH COMPLEX 45 MIN: CPT

## 2025-05-08 PROCEDURE — 25000003 PHARM REV CODE 250: Performed by: NURSE PRACTITIONER

## 2025-05-08 PROCEDURE — 97530 THERAPEUTIC ACTIVITIES: CPT

## 2025-05-08 PROCEDURE — 83735 ASSAY OF MAGNESIUM: CPT | Performed by: NURSE PRACTITIONER

## 2025-05-08 PROCEDURE — 85027 COMPLETE CBC AUTOMATED: CPT | Performed by: INTERNAL MEDICINE

## 2025-05-08 PROCEDURE — 36415 COLL VENOUS BLD VENIPUNCTURE: CPT | Performed by: INTERNAL MEDICINE

## 2025-05-08 PROCEDURE — 97166 OT EVAL MOD COMPLEX 45 MIN: CPT

## 2025-05-08 PROCEDURE — 94760 N-INVAS EAR/PLS OXIMETRY 1: CPT

## 2025-05-08 PROCEDURE — 21400001 HC TELEMETRY ROOM

## 2025-05-08 PROCEDURE — 80053 COMPREHEN METABOLIC PANEL: CPT | Performed by: INTERNAL MEDICINE

## 2025-05-08 PROCEDURE — 99233 SBSQ HOSP IP/OBS HIGH 50: CPT | Mod: ,,, | Performed by: INTERNAL MEDICINE

## 2025-05-08 RX ORDER — SODIUM CHLORIDE 9 MG/ML
INJECTION, SOLUTION INTRAVENOUS CONTINUOUS
Status: ACTIVE | OUTPATIENT
Start: 2025-05-08 | End: 2025-05-09

## 2025-05-08 RX ADMIN — INSULIN GLARGINE 5 UNITS: 100 INJECTION, SOLUTION SUBCUTANEOUS at 09:05

## 2025-05-08 RX ADMIN — MYCOPHENOLATE MOFETIL 500 MG: 250 CAPSULE ORAL at 09:05

## 2025-05-08 RX ADMIN — APIXABAN 5 MG: 2.5 TABLET, FILM COATED ORAL at 08:05

## 2025-05-08 RX ADMIN — CALCITRIOL CAPSULES 0.25 MCG 0.25 MCG: 0.25 CAPSULE ORAL at 08:05

## 2025-05-08 RX ADMIN — SENNOSIDES AND DOCUSATE SODIUM 1 TABLET: 50; 8.6 TABLET ORAL at 08:05

## 2025-05-08 RX ADMIN — FEBUXOSTAT 40 MG: 40 TABLET, FILM COATED ORAL at 08:05

## 2025-05-08 RX ADMIN — ACETAMINOPHEN 650 MG: 325 TABLET ORAL at 08:05

## 2025-05-08 RX ADMIN — TACROLIMUS 2 MG: 1 CAPSULE ORAL at 05:05

## 2025-05-08 RX ADMIN — MYCOPHENOLATE MOFETIL 500 MG: 250 CAPSULE ORAL at 08:05

## 2025-05-08 RX ADMIN — POLYETHYLENE GLYCOL 3350 17 G: 17 POWDER, FOR SOLUTION ORAL at 08:05

## 2025-05-08 RX ADMIN — ASPIRIN 81 MG: 81 TABLET, COATED ORAL at 08:05

## 2025-05-08 RX ADMIN — SODIUM CHLORIDE: 9 INJECTION, SOLUTION INTRAVENOUS at 11:05

## 2025-05-08 RX ADMIN — APIXABAN 5 MG: 2.5 TABLET, FILM COATED ORAL at 09:05

## 2025-05-08 RX ADMIN — SENNOSIDES AND DOCUSATE SODIUM 1 TABLET: 50; 8.6 TABLET ORAL at 09:05

## 2025-05-08 RX ADMIN — SODIUM CHLORIDE: 9 INJECTION, SOLUTION INTRAVENOUS at 09:05

## 2025-05-08 RX ADMIN — INSULIN GLARGINE 5 UNITS: 100 INJECTION, SOLUTION SUBCUTANEOUS at 08:05

## 2025-05-08 RX ADMIN — PREDNISONE 5 MG: 5 TABLET ORAL at 08:05

## 2025-05-08 RX ADMIN — INSULIN ASPART 1 UNITS: 100 INJECTION, SOLUTION INTRAVENOUS; SUBCUTANEOUS at 09:05

## 2025-05-08 RX ADMIN — SODIUM FERRIC GLUCONATE COMPLEX 125 MG: 12.5 INJECTION INTRAVENOUS at 08:05

## 2025-05-08 RX ADMIN — TACROLIMUS 2 MG: 1 CAPSULE ORAL at 08:05

## 2025-05-08 NOTE — ASSESSMENT & PLAN NOTE
Nephrology following   Continue Prograf, Prednisone, Cellcept   Prograf level 10 - decreased to 2mg/2mg

## 2025-05-08 NOTE — PLAN OF CARE
Problem: Adult Inpatient Plan of Care  Goal: Plan of Care Review  Outcome: Progressing  Goal: Patient-Specific Goal (Individualized)  Outcome: Progressing  Goal: Absence of Hospital-Acquired Illness or Injury  Outcome: Progressing  Goal: Optimal Comfort and Wellbeing  Outcome: Progressing  Goal: Readiness for Transition of Care  Outcome: Progressing     Problem: Diabetes Comorbidity  Goal: Blood Glucose Level Within Targeted Range  Outcome: Progressing     Problem: Sepsis/Septic Shock  Goal: Optimal Coping  Outcome: Progressing  Goal: Absence of Bleeding  Outcome: Progressing  Goal: Blood Glucose Level Within Targeted Range  Outcome: Progressing  Goal: Absence of Infection Signs and Symptoms  Outcome: Progressing  Goal: Optimal Nutrition Intake  Outcome: Progressing     Problem: Skin Injury Risk Increased  Goal: Skin Health and Integrity  Outcome: Progressing

## 2025-05-08 NOTE — PLAN OF CARE
[id number]/[id number] SB Whittaker is a 69 y.o.male admitted on 5/6/2025 for Acute on chronic combined systolic and diastolic congestive heart failure   Code Status: Full Code MRN: [id number]   Review of patient's allergies indicates:   Allergen Reactions    Lisinopril Other (See Comments)     Other reaction(s):  cough    Actos  [pioglitazone] Other (See Comments)     Other reaction(s): CHF    Metformin Other (See Comments)     Other reaction(s): renal insuff  Other reaction(s): CHF     Past Medical History:   Diagnosis Date    Acquired renal cyst of left kidney     Anemia associated with chronic renal failure     CAD (coronary artery disease)     nonobstructive c 9/14    CHF (congestive heart failure)     Chronic immunosuppression with Prograf and MMF 06/18/2015    Chronic venous insufficiency of lower extremity     CKD (chronic kidney disease) stage 3, GFR 30-59 ml/min     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 RNA NEGATIVE 5/2015, 2021, 2022    History of colon polyps     HPTH (hyperparathyroidism)     Hyperlipidemia     Hypertension associated with stage 3 chronic kidney disease due to type 2 diabetes mellitus     LBBB (left bundle branch block) 12/20/2021    Morbid obesity with BMI of 45.0-49.9, adult     Nephrolithiasis 6/7/2013    PCO (posterior capsular opacification), left 03/04/2019    Proteinuria     resolved s/p transplant    S/P kidney transplant     Sleep apnea     Type 2 diabetes, uncontrolled, with retinopathy     Type II diabetes mellitus with renal manifestations       PRN meds    0.9%  NaCl infusion (for blood administration), , Q24H PRN  acetaminophen, 650 mg,  Q8H PRN  dextrose 50%, 12.5 g, PRN  dextrose 50%, 25 g, PRN  glucagon (human recombinant), 1 mg, PRN  glucose, 16 g, PRN  glucose, 24 g, PRN  insulin aspart U-100, 0-5 Units, QID (AC + HS) PRN  ondansetron, 4 mg, Q12H PRN  prochlorperazine, 5 mg, Q6H PRN  sodium chloride 0.9%, 10 mL, PRN      Chart check completed. Will continue plan of care.      Orientation: oriented x 4  Tung Coma Scale Score: 15     Lead Monitored: Lead II Rhythm: normal sinus rhythm    Cardiac/Telemetry Box Number: [id number]  VTE Core Measure: Pharmacological prophylaxis initiated/maintained Last Bowel Movement: 05/05/25  Diet Consistent Carbohydrate 2000 Calories (up to 75 gm per meal); Heart Healthy; Fluid - 1500mL  Voiding Characteristics: voids spontaneously without difficulty  Dewayne Score: 16  Fall Risk Score: 14  Accucheck []   Freq?      Lines/Drains/Airways       Peripheral Intravenous Line  Duration                  Hemodialysis AV Fistula Right upper arm -- days         Peripheral IV - Single Lumen 05/06/25 1312 20 G 1 1/4 in Left Antecubital 1 day         Peripheral IV - Single Lumen 05/07/25 1558 22 G Left;Posterior Hand <1 day

## 2025-05-08 NOTE — ASSESSMENT & PLAN NOTE
01/23/22 1152   Oxygen Therapy   O2 Sat (%) 90 %   O2 Device Hi flow nasal cannula  (salter)   O2 Flow Rate (L/min) 9 l/min ANGELITO is likely due to CHF Baseline creatinine is 1.9-2.8. Most recent creatinine and eGFR are listed below.  Recent Labs     05/06/25  1312 05/07/25  0417 05/08/25  0506   CREATININE 5.7* 5.7* 5.8*   EGFRNORACEVR 10* 10* 10*      Plan  - ANGELITO is worsening. Will continue current treatment  - Avoid nephrotoxins and renally dose meds for GFR listed above  - Monitor urine output, serial BMP, and adjust therapy as needed  Urinalysis with trace protein trace blood, urine protein creatinine ratio 0.49  - Begun on hydration

## 2025-05-08 NOTE — ASSESSMENT & PLAN NOTE
Patient's FSGs are controlled on current medication regimen.  Last A1c reviewed-   Lab Results   Component Value Date    HGBA1C 6.3 (H) 05/07/2025     Most recent fingerstick glucose reviewed-   Recent Labs   Lab 05/07/25  1838 05/07/25 2048 05/08/25  0627   POCTGLUCOSE 209* 191* 131*     Current correctional scale  Low  Maintain anti-hyperglycemic dose as follows-   Antihyperglycemics (From admission, onward)      Start     Stop Route Frequency Ordered    05/06/25 2100  insulin glargine U-100 (Lantus) pen 5 Units         -- SubQ 2 times daily 05/06/25 1644    05/06/25 1813  insulin aspart U-100 pen 0-5 Units         -- SubQ Before meals & nightly PRN 05/06/25 1713          Hold Oral hypoglycemics while patient is in the hospital.    Cont Lantus- titrate

## 2025-05-08 NOTE — PLAN OF CARE
05/08/25 1045   Rounds   Attendance Provider;Nurse ;Charge nurse;Physical therapist;Occupational therapist   Discharge Plan A Home Health;Home with family   Why the patient remains in the hospital Requires continued medical care   Transition of Care Barriers Bariatric;Mobility     Nephrology consulted, patient started on IV Lasix per MD.  PT/OT evals pending

## 2025-05-08 NOTE — PROGRESS NOTES
[facility])  Nephrology  Progress Note    Patient Name: Mitch Whittaker  MRN: [id number]  Admission Date: 5/6/2025  Hospital Length of Stay: 2 days  Attending Provider: Ann Lazo MD   Primary Care Physician: Valery Caal MD  Principal Problem:Acute on chronic combined systolic and diastolic congestive heart failure    Inpatient consult to Nephrology  Consult performed by: Noel Jimenes MD  Consult ordered by: Ann Lazo MD  Reason for consult: ANGELITO, CKD, kidney transplant        Subjective:   HPI: Thank you for referring the pt to us. H/o and chart were reviewed. Pt was seen and examined.  Patient is a 69-year-old male with a history of living related kidney transplant from his daughter in 2015, CKD stage 3 at baseline, DM-2, HTN, combined systolic (EF 40%) and diastolic CHF, and obesity who presented to ER with chest discomfort and shortness of breath.  Patient had seen me virtually as part of renal follow-up visit just 5 days ago.  He expressed some discomfort and SOB then but not to this extent.  He admits that he is worse now.  He has been drinking a lot of water and other liquids.  He says he is not restricting salt in his diet.  He has no other complaints.  No fever, no cough, no chest pain on exertion, just a feeling of fullness in his chest, he feels his legs are weaker than before, and he denies any abdominal pain or discomfort at the graft site.  He says he has been compliant with his medications.  He does not know his medicines.  His daughter normally dispenses them to him.       Interval History:  Patient was seen in his hospital room.  In bed resting comfortably.  No acute distress noted.  No new complaints or issues from overnight.    Review of systems: Denies shortness of breath and chest discomfort.  No fevers or chills.  No nausea vomiting or diarrhea.  No swelling.    Review of patient's allergies indicates:   Allergen Reactions    Lisinopril  Other (See Comments)     Other reaction(s):  cough    Actos  [pioglitazone] Other (See Comments)     Other reaction(s): CHF    Metformin Other (See Comments)     Other reaction(s): renal insuff  Other reaction(s): CHF     Current Facility-Administered Medications   Medication Frequency    0.9%  NaCl infusion (for blood administration) Q24H PRN    acetaminophen tablet 650 mg Q8H PRN    apixaban tablet 5 mg BID    aspirin EC tablet 81 mg Daily    calcitRIOL capsule 0.25 mcg Daily    dextrose 50% injection 12.5 g PRN    dextrose 50% injection 25 g PRN    epoetin jeronimo injection 5,000 Units Q7 Days    famotidine tablet 20 mg Every other day    febuxostat tablet 40 mg Daily    ferric gluconate (Ferrlecit) 62.5 mg/5 mL injection 125 mg Daily    furosemide (Lasix) 200 mg in 0.9% NaCl SolP 100 mL continuous infusion (conc: 2 mg/mL) Continuous    glucagon (human recombinant) injection 1 mg PRN    glucose chewable tablet 16 g PRN    glucose chewable tablet 24 g PRN    insulin aspart U-100 pen 0-5 Units QID (AC + HS) PRN    insulin glargine U-100 (Lantus) pen 5 Units BID    metoprolol succinate (TOPROL-XL) 24 hr tablet 25 mg Daily    mycophenolate capsule 500 mg BID    ondansetron injection 4 mg Q12H PRN    polyethylene glycol packet 17 g Daily    predniSONE tablet 5 mg Daily    prochlorperazine injection Soln 5 mg Q6H PRN    senna-docusate 8.6-50 mg per tablet 1 tablet BID    sodium chloride 0.9% flush 10 mL PRN    tacrolimus capsule 2 mg BID       Objective:     Vital Signs (Most Recent):  Temp: 97.4 °F (36.3 °C) (05/08/25 0835)  Pulse: 84 (05/08/25 0901)  Resp: 18 (05/08/25 0835)  BP: 102/60 (05/08/25 0849)  SpO2: 99 % (05/08/25 0835) Vital Signs (24h Range):  Temp:  [96 °F (35.6 °C)-97.8 °F (36.6 °C)] 97.4 °F (36.3 °C)  Pulse:  [73-94] 84  Resp:  [17-18] 18  SpO2:  [92 %-100 %] 99 %  BP: (102-147)/(50-69) 102/60     Weight: 105.7 kg (233 lb) (05/08/25 0901)  Body mass index is 31.6 kg/m².  Body surface area is 2.32 meters  squared.    I/O last 3 completed shifts:  In: 653.9 [P.O.:240; I.V.:113.9; Blood:300]  Out: 800 [Urine:800]    Physical Exam  Constitutional:       Appearance: Normal appearance.   HENT:      Head: Normocephalic and atraumatic.      Mouth/Throat:      Comments: Mucous membranes are tacky  Eyes:      General: No scleral icterus.     Extraocular Movements: Extraocular movements intact.      Pupils: Pupils are equal, round, and reactive to light.   Pulmonary:      Effort: Pulmonary effort is normal.      Breath sounds: No stridor.   Musculoskeletal:      Right lower leg: No edema.      Left lower leg: No edema.   Skin:     General: Skin is warm and dry.   Neurological:      General: No focal deficit present.      Mental Status: He is alert and oriented to person, place, and time.   Psychiatric:         Mood and Affect: Mood normal.         Behavior: Behavior normal.         Significant Labs:sureBMP:   Recent Labs   Lab 05/08/25  0506   *      K 4.7      CO2 24   BUN 83*   CREATININE 5.8*   CALCIUM 9.3   MG 1.6     CMP:   Recent Labs   Lab 05/08/25  0506   *   CALCIUM 9.3   ALBUMIN 1.5*   PROT 6.3      K 4.7   CO2 24      BUN 83*   CREATININE 5.8*   ALKPHOS 237*   ALT 30   AST 32   BILITOT 0.4     All labs within the past 24 hours have been reviewed.    Significant Imaging:  Labs: Reviewed      Assessment/Plan:     Active Diagnoses:    Diagnosis Date Noted POA    PRINCIPAL PROBLEM:  Acute on chronic combined systolic and diastolic congestive heart failure [I50.43] 03/15/2019 Yes    Immunosuppression [D84.9] 05/06/2025 Yes    Arthralgia [M25.50] 05/06/2025 Yes    History of DVT (deep vein thrombosis) [Z86.718] 04/02/2025 Not Applicable     Chronic    Gout [M10.9] 03/15/2025 Yes    Debility [R53.81] 01/18/2025 Yes    ANGELITO on CKD 4 [N18.4] 06/24/2024 Yes    Hypertension associated with stage 3 chronic kidney disease due to type 2 diabetes mellitus [E11.22, I12.9, N18.30]  Yes    Type II  diabetes mellitus with renal manifestations [E11.29]  Yes    Secondary hyperparathyroidism of renal origin [N25.81] 06/23/2015 Yes    Living-related donor kidney transplant (daughter) - 6/12/15 [Z94.0]  Not Applicable     Chronic    Coronary artery disease of native artery of native heart with stable angina pectoris [I25.118]  Yes     Chronic    Obstructive sleep apnea [G47.33] 06/13/2013 Yes     Chronic      Problems Resolved During this Admission:       Assessment plan:    1. ANGELITO:  Baseline creatinine varies widely between about 2.0 and 3.0.  Creatinine is up to 5.8 with a BUN of 83.    On exam he appears to be dry.  Mucous membranes are tacky.  Neck veins are flat.  There is no evidence of edema.  Skin is tenting on exam.    Chest x-ray did not show evidence of pulmonary edema.  CT of the chest also did not show any infiltrates or evidence of volume overload.    At this point he appears to be over diuresed.  Will discontinue Lasix drip.  Will start gentle hydration at 100 cc normal saline an hour for 20 hours for a total of 2 L.  Will continue to monitor volume status closely.    2. CKD 4: Baseline creatinine appears run between about 2.0 and 3.0.  This fluctuation is likely hemodynamic in nature.    3. Kidney transplant: He was transplanted in 2015.  Prograf was decreased to 2 mg twice daily.  His level was 10 yesterday.  Will check a level again tomorrow.  He is also on mycophenolate 500 mg twice daily and prednisone 5 mg daily.    4. Electrolytes: Potassium is stable 4.7.    Discussed with hospital medicine.    A minimum of 50 minutes was spent in face-to-face conversation, chart review and coordination of care with other providers.        Thank you for your consult.     Noel Jimenes MD  Nephrology  [facility] ([facility])

## 2025-05-08 NOTE — ASSESSMENT & PLAN NOTE
Patients blood pressure range in the last 24 hours was: BP  Min: 102/60  Max: 147/69.The patient's inpatient anti-hypertensive regimen is listed below:  Current Antihypertensives  metoprolol succinate (TOPROL-XL) 24 hr tablet 25 mg, Daily, Oral    Plan  - BP is controlled, no changes needed to their regimen

## 2025-05-08 NOTE — SUBJECTIVE & OBJECTIVE
Interval History:  Patient seen and examined at bedside.  He states he would like something to drink in his remaining thirsty.  He continues to complain of pain in his extremities however it has improved.    Review of Systems   Constitutional:  Positive for activity change, appetite change and fatigue.   Respiratory:  Negative for cough and shortness of breath.    Cardiovascular:  Negative for chest pain and palpitations.   Gastrointestinal:  Positive for abdominal pain. Negative for blood in stool, constipation, diarrhea, nausea, rectal pain and vomiting.   Musculoskeletal:  Positive for arthralgias and joint swelling.   Neurological:  Positive for weakness.   All other systems reviewed and are negative.    Objective:     Vital Signs (Most Recent):  Temp: 97.4 °F (36.3 °C) (05/08/25 0835)  Pulse: 86 (05/08/25 1323)  Resp: 18 (05/08/25 0835)  BP: 102/60 (05/08/25 0849)  SpO2: 99 % (05/08/25 0835) Vital Signs (24h Range):  Temp:  [96 °F (35.6 °C)-97.8 °F (36.6 °C)] 97.4 °F (36.3 °C)  Pulse:  [80-94] 86  Resp:  [18] 18  SpO2:  [92 %-100 %] 99 %  BP: (102-138)/(50-60) 102/60     Weight: 105.7 kg (233 lb)  Body mass index is 31.6 kg/m².    Intake/Output Summary (Last 24 hours) at 5/8/2025 1447  Last data filed at 5/8/2025 0917  Gross per 24 hour   Intake 445.03 ml   Output 400 ml   Net 45.03 ml         Physical Exam  Vitals reviewed.   Constitutional:       Appearance: He is obese. He is ill-appearing.   HENT:      Head: Normocephalic and atraumatic.      Mouth/Throat:      Mouth: Mucous membranes are moist.      Pharynx: Oropharynx is clear.   Eyes:      Extraocular Movements: Extraocular movements intact.      Conjunctiva/sclera: Conjunctivae normal.   Cardiovascular:      Rate and Rhythm: Normal rate and regular rhythm.      Pulses: Normal pulses.      Heart sounds: Murmur heard.   Pulmonary:      Effort: Pulmonary effort is normal.      Breath sounds: Normal breath sounds.   Abdominal:      General: Bowel sounds are  normal.      Palpations: Abdomen is soft.      Tenderness: There is abdominal tenderness. There is no guarding or rebound.   Musculoskeletal:         General: Swelling, tenderness and deformity present.      Cervical back: Normal range of motion and neck supple.   Skin:     General: Skin is warm and dry.      Findings: Lesion (Chronic venous stasis changes bilaterally to lower extremities with healed ulcers) present.   Neurological:      Mental Status: He is alert and oriented to person, place, and time. Mental status is at baseline.      Motor: Weakness present.           Significant Labs: All pertinent labs within the past 24 hours have been reviewed.  CBC:   Recent Labs   Lab 05/07/25  0417 05/08/25  0506   WBC 3.16* 3.28*   HGB 6.9* 8.7*   HCT 25.0* 29.4*    409     CMP:   Recent Labs   Lab 05/07/25  0417 05/08/25  0506    137   K 5.0 4.7    100   CO2 23 24   * 138*   BUN 82* 83*   CREATININE 5.7* 5.8*   CALCIUM 9.3 9.3   PROT 6.2 6.3   ALBUMIN 1.4* 1.5*   BILITOT 0.4 0.4   ALKPHOS 238* 237*   AST 41 32   ALT 30 30   ANIONGAP 13 13       Significant Imaging: I have reviewed all pertinent imaging results/findings within the past 24 hours.

## 2025-05-08 NOTE — ASSESSMENT & PLAN NOTE
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 The patient's latest AMPAC (Activity Measure for Post Acute Care) Score is listed below.    AM-PAC Score - How much help does the patient need for each activity listed  Basic Mobility Total Score: 6  Turning over in bed (including adjusting bedclothes, sheets and blankets)?: Unable  Sitting down on and standing up from a chair with arms (e.g., wheelchair, bedside commode, etc.): Unable  Moving from lying on back to sitting on the side of the bed?: Unable  Moving to and from a bed to a chair (including a wheelchair)?: Unable  Need to walk in hospital room?: Unable  Climbing 3-5 steps with a railing?: Unable    Plan  - Progressive mobility protocol initated  - Fall precautions in place

## 2025-05-08 NOTE — PT/OT/SLP EVAL
Occupational Therapy   Evaluation and Discharge Note    Name: Mitch Whittaker  MRN: [id number]  Admitting Diagnosis: Acute on chronic combined systolic and diastolic congestive heart failure  Recent Surgery: * No surgery found *      Recommendations:     Discharge Recommendations: No Therapy Indicated  Discharge Equipment Recommendations: none  Barriers to discharge:       Assessment:     Mitch Whittaker is a 69 y.o. male with a medical diagnosis of Acute on chronic combined systolic and diastolic congestive heart failure. At this time, patient is functioning at their prior level of function and does not require further acute OT services.     Plan:     During this hospitalization, patient does not require further acute OT services.  Please re-consult if situation changes.    Plan of Care Reviewed with: patient    Subjective     Chief Complaint: debility and generalized weakness  Patient/Family Comments/goals:  return home     Occupational Profile:  Living Environment: lives with family total a   Previous level of function: total a   Roles and Routines: no work or no drive; pt requires total a   Equipment Used at home: walker, rolling, lift device, bedside commode, hospital bed, wheelchair  Assistance upon Discharge: family with 24 hour care    Pain/Comfort:  Pain Rating 1: 8/10    Patients cultural, spiritual, Hinduism conflicts given the current situation:      Objective:     Communicated with: nurse and epic chart review prior to session.  Patient found supine with telemetry, peripheral IV, PureWick upon OT entry to room.    General Precautions: Standard, fall  Orthopedic Precautions: N/A  Braces: N/A  Respiratory Status: Room air     Occupational Performance:    Bed Mobility:    Patient completed Rolling/Turning to Left with  total assistance  Patient completed Rolling/Turning to Right with total assistance  Patient completed Scooting/Bridging with total assistance    Activities of Daily Living:  Grooming: total  assistance .  Upper Body Dressing: total assistance .  Lower Body Dressing: total assistance .    Cognitive/Visual Perceptual:  Cognitive/Psychosocial Skills:     -       Oriented to: Person, Place, Time, and Situation   -       Follows Commands/attention:Follows multistep  commands  -       Communication: clear/fluent  -       Memory: No Deficits noted  -       Safety awareness/insight to disability: impaired     Physical Exam:  Upper Extremity Range of Motion:     -       Right Upper Extremity: limited rom per painful gout limited ROM hand/digits  -       Left Upper Extremity: limited rom per painful gout limited ROM hand /digits  Upper Extremity Strength:    -       Right Upper Extremity: MMT: 1/5 GROSSLY  -       Left Upper Extremity: MMT: 1/5 GROSSLY   Strength:    -       Right Upper Extremity: MMT: 1/5 GROSSLY  -       Left Upper Extremity: MMT: 1/5 GROSSLY    AMPAC 6 Click ADL:  AMPAC Total Score: 6    Treatment & Education:  PT DISCHARGED FROM SKILLED O.T.  DUE TO ANTICIPATED FUNCTIONAL GAINS AND PT REQ TOTAL/ MAX WITH ADL'S AND FUNCTIONAL MOBILITY  Patient left left sidelying with all lines intact, call button in reach, bed alarm on, and NURSE JOIE WALDROP notified    GOALS:   Multidisciplinary Problems       Occupational Therapy Goals       Not on file                    History:     Past Medical History:   Diagnosis Date    Acquired renal cyst of left kidney     Anemia associated with chronic renal failure     CAD (coronary artery disease)     nonobstructive [facility] 9/14    CHF (congestive heart failure)     Chronic immunosuppression with Prograf and MMF 06/18/2015    Chronic venous insufficiency of lower extremity     CKD (chronic kidney disease) stage 3, GFR 30-59 ml/min     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 RNA NEGATIVE 5/2015, 2021, 2022    History of colon polyps     HPTH (hyperparathyroidism)     Hyperlipidemia     Hypertension associated with stage 3 chronic kidney disease due to type 2 diabetes mellitus     LBBB (left bundle branch block) 12/20/2021    Morbid obesity with BMI of 45.0-49.9, adult     Nephrolithiasis 6/7/2013    PCO (posterior capsular opacification), left 03/04/2019    Proteinuria     resolved s/p transplant    S/P kidney transplant     Sleep apnea     Type 2 diabetes, uncontrolled, with retinopathy     Type II diabetes mellitus with renal manifestations          Past Surgical History:   Procedure Laterality Date    CARDIAC CATHETERIZATION  01/01/2008    normal coronary    CARPAL TUNNEL RELEASE Right 12/01/2023    Procedure: RELEASE, CARPAL TUNNEL;  Surgeon: Noel Almonte MD;  Location: [facility] OR;  Service: Orthopedics;  Laterality: Right;    CARPAL TUNNEL RELEASE Left 7/18/2024    Procedure: RELEASE, CARPAL TUNNEL;  Surgeon: Noel Almonte MD;  Location: [facility] OR;  Service: Orthopedics;  Laterality: Left;    COLONOSCOPY N/A 04/05/2018    Procedure: COLONOSCOPY;  Surgeon: Chava Ronquillo MD;  Location: [facility] ENDO;  Service: Endoscopy;  Laterality: N/A;    COLONOSCOPY N/A 05/02/2022    Procedure: COLONOSCOPY;  Surgeon: Alix Puente MD;  Location: [facility];  Service: Endoscopy;  Laterality: N/A;    COLONOSCOPY N/A 06/07/2023    Procedure: COLONOSCOPY - rule out CMV  Cardiac clearance/Eliquis hold approval received on 05/21/23 per Dr. Meade, cardiology.  Note in encounters.  LB;  Surgeon: Daniella Shah MD;  Location: [facility];  Service: Endoscopy;  Laterality: N/A;    ESOPHAGOGASTRODUODENOSCOPY N/A 03/26/2024    Procedure: EGD (ESOPHAGOGASTRODUODENOSCOPY) 3/1-pt cleared, ok to hold eliquis for 3 days;  Surgeon:  Daniella Shah MD;  Location: [facility];  Service: Endoscopy;  Laterality: N/A;    KIDNEY TRANSPLANT  2015    RETINAL LASER PROCEDURE         Time Tracking:     OT Date of Treatment: 05/08/25  OT Start Time: 1120  OT Stop Time: 1150  OT Total Time (min): 30 min    Billable Minutes:Evaluation 15 MINUTES  Therapeutic Activity 15 MINUTES    5/8/2025

## 2025-05-08 NOTE — PT/OT/SLP EVAL
Physical Therapy Evaluation and Discharge Note    Patient Name:  Mitch Whittaker   MRN:  [id number]    Recommendations:     Discharge Recommendations: No Therapy Indicated  Discharge Equipment Recommendations: none   Barriers to discharge: None    Assessment:     Mitch Whittaker is a 69 y.o. male admitted with a medical diagnosis of Acute on chronic combined systolic and diastolic congestive heart failure. .  At this time, patient is functioning at their prior level of function and does not require further acute PT services.     Recent Surgery: * No surgery found *      Plan:     During this hospitalization, patient does not require further acute PT services.  Please re-consult if situation changes.      Subjective     Chief Complaint: pain all over   Patient/Family Comments/goals: none stated   Pain/Comfort:  Pain Rating 1: 8/10  Location 1: other (see comments) (b ue/ le)  Pain Rating Post-Intervention 1: 8/10    Patients cultural, spiritual, Buddhist conflicts given the current situation:      Living Environment:   PT LIVES AT HOME WITH FAMILY WHO CARE FOR HIM   Prior to admission, patients level of function was TOTAL CARE T/F VIA JEAN LIFT.  PT HAS BEEN TOTAL CARE SINCE 1/25. Equipment used at home: walker, rolling, wheelchair, lift device, hospital bed, slide board, bedside commode.  DME owned (not currently used): none.  Upon discharge, patient will have assistance from FAMILY .    Objective:     Communicated with MD AND [facility] CHART REVIEW  prior to session.  Patient found supine with telemetry, peripheral IV, PureWick upon PT entry to room.    General Precautions: Standard, fall    Orthopedic Precautions:N/A   Braces: N/A      Exams:  Cognitive Exam:  Patient is oriented to Person, Place, Time, and Situation  RLE ROM: LIMITED  RLE Strength: UNABLE TO ASSESS  LLE ROM: LIMITED  LLE Strength: UNABLE TO ASSESS    Functional Mobility:  Bed Mobility:     Rolling Left:  dependence  Rolling Right:  "dependence    AM-PAC 6 CLICK MOBILITY  Total Score:6       Treatment and Education:  PT LEFT IN LEFT SIDE LYING TO UNWT BACK SIDE.   PT EDUCATED ON "CALL DON'T FALL", ENCOURAGED TO CALL FOR ASSISTANCE WITH ALL NEEDS FOR OOB MOBILITY.      AM-PAC 6 CLICK MOBILITY  Total Score:6     Patient left left sidelying with call button in reach.    GOALS:   Multidisciplinary Problems       Physical Therapy Goals       Not on file                    DME Justifications:  No DME recommended requiring DME justifications    History:     Past Medical History:   Diagnosis Date    Acquired renal cyst of left kidney     Anemia associated with chronic renal failure     CAD (coronary artery disease)     nonobstructive [facility] 9/14    CHF (congestive heart failure)     Chronic immunosuppression with Prograf and MMF 06/18/2015    Chronic venous insufficiency of lower extremity     CKD (chronic kidney disease) stage 3, GFR 30-59 ml/min     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 RNA NEGATIVE 5/2015, 2021, 2022    History of colon polyps     HPTH (hyperparathyroidism)     Hyperlipidemia     Hypertension associated with stage 3 chronic kidney disease due to type 2 diabetes mellitus     LBBB (left bundle branch block) 12/20/2021    Morbid obesity with BMI of 45.0-49.9, adult     Nephrolithiasis 6/7/2013    PCO (posterior capsular opacification), left 03/04/2019    Proteinuria     resolved s/p transplant    S/P kidney transplant     Sleep apnea     Type 2 diabetes, uncontrolled, with retinopathy     Type II diabetes mellitus with renal manifestations        Past Surgical History:   Procedure Laterality Date "    CARDIAC CATHETERIZATION  01/01/2008    normal coronary    CARPAL TUNNEL RELEASE Right 12/01/2023    Procedure: RELEASE, CARPAL TUNNEL;  Surgeon: Noel Almonte MD;  Location: [facility] OR;  Service: Orthopedics;  Laterality: Right;    CARPAL TUNNEL RELEASE Left 7/18/2024    Procedure: RELEASE, CARPAL TUNNEL;  Surgeon: Noel Almonte MD;  Location: [facility] OR;  Service: Orthopedics;  Laterality: Left;    COLONOSCOPY N/A 04/05/2018    Procedure: COLONOSCOPY;  Surgeon: Chava Ronquillo MD;  Location: [facility] ENDO;  Service: Endoscopy;  Laterality: N/A;    COLONOSCOPY N/A 05/02/2022    Procedure: COLONOSCOPY;  Surgeon: Alix Puente MD;  Location: [facility];  Service: Endoscopy;  Laterality: N/A;    COLONOSCOPY N/A 06/07/2023    Procedure: COLONOSCOPY - rule out CMV  Cardiac clearance/Eliquis hold approval received on 05/21/23 per Dr. Meade, cardiology.  Note in encounters.  LB;  Surgeon: Daniella Shah MD;  Location: [facility];  Service: Endoscopy;  Laterality: N/A;    ESOPHAGOGASTRODUODENOSCOPY N/A 03/26/2024    Procedure: EGD (ESOPHAGOGASTRODUODENOSCOPY) 3/1-pt cleared, ok to hold eliquis for 3 days;  Surgeon: Daniella Shah MD;  Location: [facility];  Service: Endoscopy;  Laterality: N/A;    KIDNEY TRANSPLANT  2015    RETINAL LASER PROCEDURE         Time Tracking:     PT Received On: 05/08/25  PT Start Time: 1100     PT Stop Time: 1115  PT Total Time (min): 15 min     Billable Minutes: Evaluation 15      05/08/2025

## 2025-05-08 NOTE — PROGRESS NOTES
"[facility] ([facility]  Progress Note    Patient Name: Mitch Whittaker  MRN: [id number]  Patient Class: IP- Inpatient   Admission Date: 5/6/2025  Length of Stay: 2 days  Attending Physician: Ann Lazo MD  Primary Care Provider: Valery Caal MD        Subjective     Principal Problem:ANGELITO/CKD (chronic kidney disease) stage 4, GFR 15-29 ml/min        HPI:  The patient is a 70yo male with CAD, Combined CHF EF 35 - 40%, s/p Renal transplant 2015, Chronic immunosuppression, CKD3, DM, Anemia, HLD, HTN, Hx DVT- on Eliquis, Gout, Chronic venous stasis and recurrent skin infections, and Morbid obesity who presented to the ED with abdominal pain. Pt reports lower back pain radiating to his abdomen describes as an aching pain rated 8/10 that has occurred on/off for the last 3 days. Pt denies fever, chills, chest pain, SOB, cough, N/V, Diarrhea, or constipation. He reports a normal BM yesterday. Pt also complains of worsening weakness and pain to arms and legs which he attributs to gout. He reports pain is worse to his hands, left knee, and ankles. He reports his left knee has been swollen "for awhile". He reports chronic swelling to BLE. Pt is bed bound at home and lives with his wife and daughters who are his caretakers.   Pt reports abdominal pain has resolved since arrival to ED     In the ED, Afebrile, VSS, oxygenation normal. Labs revealed mild leukopenia with WBC 3.4, Hgb 7.3, BUN 79, sCr 5.7 (baseline 1.9-2.8), Albumin 1.5, , Trop 0.067>0.062. EKG showed sinus tachycardia -rate 108, PACs, nonspecific intraventricular block   CT chest/abd/pelvis showed Small left pleural effusion, Nonspecific distention of the gallbladder, No gallbladder wall thickening or bile duct dilatation, Fatty liver, Native kidneys are small and atrophic, No hydronephrosis, No ureteral stones, Right renal transplant.    Ed provider discussed care with nephrologist, Dr. Gonsalez who recommended IV " Lasix drip plus metolazone as needed 5-10 mg per dose     Overview/Hospital Course:  Patient is a 69-year-old male with a history of coronary artery disease, biventricular CHF with an EF of 35-40%, status post living related donor kidney transplant 2015 on chronic immunosuppression, ANGELITO on CKD 3, diabetes mellitus, anemia, hypertension, history of DVT on Eliquis, gout, chronic venous stasis, morbid obesity.  Patient presented to the emergency department complaining of back pain radiating to his abdomen 8/10.  He reports it is has been occurring for approximately 3 days prior to admission.  Health who complains of generalized body ache worse pain in his legs and knees.  He states this has been going on for quite some time.  Emergency department labs revealed a white count of 3.4 hemoglobin 7.3 creatinine of 5.7 which is up from about 3.  Albumin is 1.5.  CT scan chest abdomen and pelvis revealed a small left pleural effusion otherwise none specific.    Patient was seen by his nephrologist Dr. Gonsalez who initiated Lasix drip with metolazone.  After review by rounding nephrologist it was felt that he was maybe slightly over diuresed.  Diuretics were held and patient was started on normal saline at 100 cc an hour for 2 L.  Prograf level noted to be 10 and Prograf decreased to 2 mg b.i.d. recheck level in a.m.  Markedly elevated uric acid, patient was initiated on Uloric.  Seen and examined by Physical therapy/Occupational therapy who felt that patient was at his baseline status and declined further intervention.      Echo:    Left Ventricle: The left ventricle is normal in size. Moderately increased ventricular mass. Moderately increased wall thickness. There is moderate concentric hypertrophy. Normal wall motion. Septal motion is consistent with bundle branch block. There is moderately reduced systolic function with a visually estimated ejection fraction of 35 - 40%. Grade I diastolic dysfunction.    Right Ventricle:  The right ventricle is normal in size Wall thickness is normal. Systolic function is normal.    Mitral Valve: Mildly calcified leaflets. There is mild mitral annular calcification.    Tricuspid Valve: There is mild regurgitation.    Aorta: The aortic annulus is mildly dilated measuring 4.0 cm. The aortic root is normal in size. The ascending aorta is normal in size measuring 3.3 cm.    Interval History:  Patient seen and examined at bedside.  He states he would like something to drink in his remaining thirsty.  He continues to complain of pain in his extremities however it has improved.    Review of Systems   Constitutional:  Positive for activity change, appetite change and fatigue.   Respiratory:  Negative for cough and shortness of breath.    Cardiovascular:  Negative for chest pain and palpitations.   Gastrointestinal:  Positive for abdominal pain. Negative for blood in stool, constipation, diarrhea, nausea, rectal pain and vomiting.   Musculoskeletal:  Positive for arthralgias and joint swelling.   Neurological:  Positive for weakness.   All other systems reviewed and are negative.    Objective:     Vital Signs (Most Recent):  Temp: 97.4 °F (36.3 °C) (05/08/25 0835)  Pulse: 86 (05/08/25 1323)  Resp: 18 (05/08/25 0835)  BP: 102/60 (05/08/25 0849)  SpO2: 99 % (05/08/25 0835) Vital Signs (24h Range):  Temp:  [96 °F (35.6 °C)-97.8 °F (36.6 °C)] 97.4 °F (36.3 °C)  Pulse:  [80-94] 86  Resp:  [18] 18  SpO2:  [92 %-100 %] 99 %  BP: (102-138)/(50-60) 102/60     Weight: 105.7 kg (233 lb)  Body mass index is 31.6 kg/m².    Intake/Output Summary (Last 24 hours) at 5/8/2025 1447  Last data filed at 5/8/2025 0917  Gross per 24 hour   Intake 445.03 ml   Output 400 ml   Net 45.03 ml         Physical Exam  Vitals reviewed.   Constitutional:       Appearance: He is obese. He is ill-appearing.   HENT:      Head: Normocephalic and atraumatic.      Mouth/Throat:      Mouth: Mucous membranes are moist.      Pharynx: Oropharynx is  clear.   Eyes:      Extraocular Movements: Extraocular movements intact.      Conjunctiva/sclera: Conjunctivae normal.   Cardiovascular:      Rate and Rhythm: Normal rate and regular rhythm.      Pulses: Normal pulses.      Heart sounds: Murmur heard.   Pulmonary:      Effort: Pulmonary effort is normal.      Breath sounds: Normal breath sounds.   Abdominal:      General: Bowel sounds are normal.      Palpations: Abdomen is soft.      Tenderness: There is abdominal tenderness. There is no guarding or rebound.   Musculoskeletal:         General: Swelling, tenderness and deformity present.      Cervical back: Normal range of motion and neck supple.   Skin:     General: Skin is warm and dry.      Findings: Lesion (Chronic venous stasis changes bilaterally to lower extremities with healed ulcers) present.   Neurological:      Mental Status: He is alert and oriented to person, place, and time. Mental status is at baseline.      Motor: Weakness present.           Significant Labs: All pertinent labs within the past 24 hours have been reviewed.  CBC:   Recent Labs   Lab 05/07/25  0417 05/08/25  0506   WBC 3.16* 3.28*   HGB 6.9* 8.7*   HCT 25.0* 29.4*    409     CMP:   Recent Labs   Lab 05/07/25  0417 05/08/25  0506    137   K 5.0 4.7    100   CO2 23 24   * 138*   BUN 82* 83*   CREATININE 5.7* 5.8*   CALCIUM 9.3 9.3   PROT 6.2 6.3   ALBUMIN 1.4* 1.5*   BILITOT 0.4 0.4   ALKPHOS 238* 237*   AST 41 32   ALT 30 30   ANIONGAP 13 13       Significant Imaging: I have reviewed all pertinent imaging results/findings within the past 24 hours.      Assessment & Plan  Acute on chronic combined systolic and diastolic congestive heart failure  Patient has Combined Systolic and Diastolic heart failure that is Acute on chronic. On presentation their CHF was decompensated. Evidence of decompensated CHF on presentation includes: edema, elevated JVD, and worsening renal function. The etiology of their decompensation  is likely dietary indiscretion and increased fluid intake. Most recent BNP and echo results are listed below.  Recent Labs     05/06/25  1328   *     Latest ECHO  Results for orders placed during the hospital encounter of 03/13/25    Echo Saline Bubble? No    Interpretation Summary    Left Ventricle: The left ventricle is normal in size. Mildly increased ventricular mass. Mildly increased wall thickness. There is mild concentric hypertrophy. Normal wall motion. Septal motion is consistent with bundle branch block. There is moderately reduced systolic function with a visually estimated ejection fraction of 35 - 40%. Grade I diastolic dysfunction.    Right Ventricle: The right ventricle is normal in size. Wall thickness is normal. Systolic function is normal.    Left Atrium: Mildly dilated    Aorta: Aortic annulus is mildly dilated measuring 4.01 cm. Ascending aorta is normal measuring 3.69 cm.    Pulmonary Artery: The estimated pulmonary artery systolic pressure is 24 mmHg.    IVC/SVC: Normal venous pressure at 3 mmHg.    Current Heart Failure Medications  metoprolol succinate (TOPROL-XL) 24 hr tablet 25 mg, Daily, Oral    Plan  - Monitor strict I&Os and daily weights.    - Place on telemetry  - Low sodium diet  - Place on fluid restriction of 1.5 L.   - Cardiology has not been consulted  - The patient's volume status is worsening as indicated by edema and elevated JVD. Will continue current treatment   CHF pathway initiated       ANGELITO on CKD 4  ANGELITO is likely due to CHF Baseline creatinine is 1.9-2.8. Most recent creatinine and eGFR are listed below.  Recent Labs     05/06/25  1312 05/07/25  0417 05/08/25  0506   CREATININE 5.7* 5.7* 5.8*   EGFRNORACEVR 10* 10* 10*      Plan  - ANGELITO is worsening. Will continue current treatment  - Avoid nephrotoxins and renally dose meds for GFR listed above  - Monitor urine output, serial BMP, and adjust therapy as needed  Urinalysis with trace protein trace blood, urine  protein creatinine ratio 0.49  - Begun on hydration  Obstructive sleep apnea  CPAP hs    Coronary artery disease of native artery of native heart with stable angina pectoris  Patient with known non obstructive CAD , which is controlled Will continue BB, ASA, and Statin and monitor for S/Sx of angina/ACS. Continue to monitor on telemetry.   Living-related donor kidney transplant (daughter) - 6/12/15  Nephrology following   Continue Prograf, Prednisone, Cellcept   Prograf level 10 - decreased to 2mg/2mg    Secondary hyperparathyroidism of renal origin  Stable   Cont Calcitriol     Type II diabetes mellitus with renal manifestations  Patient's FSGs are controlled on current medication regimen.  Last A1c reviewed-   Lab Results   Component Value Date    HGBA1C 6.3 (H) 05/07/2025     Most recent fingerstick glucose reviewed-   Recent Labs   Lab 05/07/25  1838 05/07/25  2048 05/08/25  0627   POCTGLUCOSE 209* 191* 131*     Current correctional scale  Low  Maintain anti-hyperglycemic dose as follows-   Antihyperglycemics (From admission, onward)      Start     Stop Route Frequency Ordered    05/06/25 2100  insulin glargine U-100 (Lantus) pen 5 Units         -- SubQ 2 times daily 05/06/25 1644    05/06/25 1813  insulin aspart U-100 pen 0-5 Units         -- SubQ Before meals & nightly PRN 05/06/25 1713          Hold Oral hypoglycemics while patient is in the hospital.    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 The  patient's latest AMPAC (Activity Measure for Post Acute Care) Score is listed below.    AM-PAC Score - How much help does the patient need for each activity listed  Basic Mobility Total Score: 6  Turning over in bed (including adjusting bedclothes, sheets and blankets)?: Unable  Sitting down on and standing up from a chair with arms (e.g., wheelchair, bedside commode, etc.): Unable  Moving from lying on back to sitting on the side of the bed?: Unable  Moving to and from a bed to a chair (including a wheelchair)?: Unable  Need to walk in hospital room?: Unable  Climbing 3-5 steps with a railing?: Unable    Plan  - Progressive mobility protocol initated  - Fall precautions in place          Gout  Check uric acid, cont Allopurinol     History of DVT (deep vein thrombosis)  Stable   Cont Eliquis     Immunosuppression  Nephrology recommended continuing pt's home medications Prograf, Cellcept, and Prednisone     Arthralgia  Uric acid 11.5  Markedly elevated inflammatory markers  Start on Uloric      VTE Risk Mitigation (From admission, onward)           Ordered     apixaban tablet 5 mg  2 times daily         05/06/25 1644     IP VTE HIGH RISK PATIENT  Once         05/06/25 1644     Place sequential compression device  Until discontinued         05/06/25 1644                    Discharge Planning   GRETEL: 5/12/2025     Code Status: Full Code   Medical Readiness for Discharge Date:   Discharge Plan A: Home Health, Home with family                Please place Justification for DME        Ann Richard MD  Department of Hospital Medicine   [facility] - Tele[facility] ([facility])

## 2025-05-08 NOTE — ASSESSMENT & PLAN NOTE
Patient has Combined Systolic and Diastolic heart failure that is Acute on chronic. On presentation their CHF was decompensated. Evidence of decompensated CHF on presentation includes: edema, elevated JVD, and worsening renal function. The etiology of their decompensation is likely dietary indiscretion and increased fluid intake. Most recent BNP and echo results are listed below.  Recent Labs     05/06/25  1328   *     Latest ECHO  Results for orders placed during the hospital encounter of 03/13/25    Echo Saline Bubble? No    Interpretation Summary    Left Ventricle: The left ventricle is normal in size. Mildly increased ventricular mass. Mildly increased wall thickness. There is mild concentric hypertrophy. Normal wall motion. Septal motion is consistent with bundle branch block. There is moderately reduced systolic function with a visually estimated ejection fraction of 35 - 40%. Grade I diastolic dysfunction.    Right Ventricle: The right ventricle is normal in size. Wall thickness is normal. Systolic function is normal.    Left Atrium: Mildly dilated    Aorta: Aortic annulus is mildly dilated measuring 4.01 cm. Ascending aorta is normal measuring 3.69 cm.    Pulmonary Artery: The estimated pulmonary artery systolic pressure is 24 mmHg.    IVC/SVC: Normal venous pressure at 3 mmHg.    Current Heart Failure Medications  metoprolol succinate (TOPROL-XL) 24 hr tablet 25 mg, Daily, Oral    Plan  - Monitor strict I&Os and daily weights.    - Place on telemetry  - Low sodium diet  - Place on fluid restriction of 1.5 L.   - Cardiology has not been consulted  - The patient's volume status is worsening as indicated by edema and elevated JVD. Will continue current treatment   CHF pathway initiated

## 2025-05-08 NOTE — PLAN OF CARE
[id number]/[id number] SB Whittaker is a 69 y.o.male admitted on 5/6/2025 for Acute on chronic combined systolic and diastolic congestive heart failure   Code Status: Full Code MRN: [id number]   Review of patient's allergies indicates:   Allergen Reactions    Lisinopril Other (See Comments)     Other reaction(s):  cough    Actos  [pioglitazone] Other (See Comments)     Other reaction(s): CHF    Metformin Other (See Comments)     Other reaction(s): renal insuff  Other reaction(s): CHF     Past Medical History:   Diagnosis Date    Acquired renal cyst of left kidney     Anemia associated with chronic renal failure     CAD (coronary artery disease)     nonobstructive c 9/14    CHF (congestive heart failure)     Chronic immunosuppression with Prograf and MMF 06/18/2015    Chronic venous insufficiency of lower extremity     CKD (chronic kidney disease) stage 3, GFR 30-59 ml/min     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 RNA NEGATIVE 5/2015, 2021, 2022    History of colon polyps     HPTH (hyperparathyroidism)     Hyperlipidemia     Hypertension associated with stage 3 chronic kidney disease due to type 2 diabetes mellitus     LBBB (left bundle branch block) 12/20/2021    Morbid obesity with BMI of 45.0-49.9, adult     Nephrolithiasis 6/7/2013    PCO (posterior capsular opacification), left 03/04/2019    Proteinuria     resolved s/p transplant    S/P kidney transplant     Sleep apnea     Type 2 diabetes, uncontrolled, with retinopathy     Type II diabetes mellitus with renal manifestations       PRN meds    0.9%  NaCl infusion (for blood administration), , Q24H PRN  acetaminophen, 650 mg,  Q8H PRN  dextrose 50%, 12.5 g, PRN  dextrose 50%, 25 g, PRN  glucagon (human recombinant), 1 mg, PRN  glucose, 16 g, PRN  glucose, 24 g, PRN  insulin aspart U-100, 0-5 Units, QID (AC + HS) PRN  ondansetron, 4 mg, Q12H PRN  prochlorperazine, 5 mg, Q6H PRN  sodium chloride 0.9%, 10 mL, PRN      Chart check completed. Will continue plan of care.      Orientation: oriented x 4  Tung Coma Scale Score: 15     Lead Monitored: Lead II Rhythm: normal sinus rhythm    Cardiac/Telemetry Box Number: [id number]  VTE Core Measure: Pharmacological prophylaxis initiated/maintained Last Bowel Movement: 05/05/25  Diet Consistent Carbohydrate 2000 Calories (up to 75 gm per meal); Heart Healthy; Fluid - 1500mL  Voiding Characteristics: voids spontaneously without difficulty  Dewayne Score: 16  Fall Risk Score: 14  Accucheck [x]   Freq?      Lines/Drains/Airways       Peripheral Intravenous Line  Duration                  Hemodialysis AV Fistula Right upper arm -- days         Peripheral IV - Single Lumen 05/06/25 1312 20 G 1 1/4 in Left Antecubital 1 day         Peripheral IV - Single Lumen 05/07/25 1558 22 G Left;Posterior Hand <1 day

## 2025-05-09 ENCOUNTER — PATIENT OUTREACH (OUTPATIENT)
Dept: ADMINISTRATIVE | Facility: CLINIC | Age: 70
End: 2025-05-09
Payer: COMMERCIAL

## 2025-05-09 LAB
ABO + RH BLD: NORMAL
ABSOLUTE NEUTROPHIL MANUAL (OHS): 1.9 K/UL
ALBUMIN SERPL BCP-MCNC: 1.5 G/DL (ref 3.5–5.2)
ANION GAP (OHS): 14 MMOL/L (ref 8–16)
ANISOCYTOSIS BLD QL SMEAR: SLIGHT
BLD PROD TYP BPU: NORMAL
BLOOD UNIT EXPIRATION DATE: NORMAL
BLOOD UNIT TYPE CODE: 7300
BUN SERPL-MCNC: 87 MG/DL (ref 8–23)
CALCIUM SERPL-MCNC: 9.2 MG/DL (ref 8.7–10.5)
CHLORIDE SERPL-SCNC: 102 MMOL/L (ref 95–110)
CK SERPL-CCNC: 29 U/L (ref 20–200)
CO2 SERPL-SCNC: 21 MMOL/L (ref 23–29)
CREAT SERPL-MCNC: 5.4 MG/DL (ref 0.5–1.4)
CROSSMATCH INTERPRETATION: NORMAL
DISPENSE STATUS: NORMAL
ERYTHROCYTE [DISTWIDTH] IN BLOOD BY AUTOMATED COUNT: 16.9 % (ref 11.5–14.5)
GFR SERPLBLD CREATININE-BSD FMLA CKD-EPI: 11 ML/MIN/1.73/M2
GLUCOSE SERPL-MCNC: 171 MG/DL (ref 70–110)
HCT VFR BLD AUTO: 24.2 % (ref 40–54)
HGB BLD-MCNC: 6.8 GM/DL (ref 14–18)
HYPOCHROMIA BLD QL SMEAR: NORMAL
LYMPHOCYTES NFR BLD MANUAL: 22 % (ref 18–48)
MAGNESIUM SERPL-MCNC: 1.1 MG/DL (ref 1.6–2.6)
MCH RBC QN AUTO: 23.9 PG (ref 27–31)
MCHC RBC AUTO-ENTMCNC: 28.1 G/DL (ref 32–36)
MCV RBC AUTO: 85 FL (ref 82–98)
MONOCYTES NFR BLD MANUAL: 5 % (ref 4–15)
NEUTROPHILS NFR BLD MANUAL: 71 % (ref 38–73)
NEUTS BAND NFR BLD MANUAL: 2 %
NUCLEATED RBC (/100WBC) (OHS): 0 /100 WBC
OB PNL STL: POSITIVE
OVALOCYTES BLD QL SMEAR: NORMAL
PHOSPHATE SERPL-MCNC: 5 MG/DL (ref 2.7–4.5)
PLATELET # BLD AUTO: 310 K/UL (ref 150–450)
PLATELET BLD QL SMEAR: NORMAL
PMV BLD AUTO: 10.4 FL (ref 9.2–12.9)
POCT GLUCOSE: 178 MG/DL (ref 70–110)
POCT GLUCOSE: 279 MG/DL (ref 70–110)
POCT GLUCOSE: 308 MG/DL (ref 70–110)
POCT GLUCOSE: 313 MG/DL (ref 70–110)
POTASSIUM SERPL-SCNC: 4.4 MMOL/L (ref 3.5–5.1)
RBC # BLD AUTO: 2.85 M/UL (ref 4.6–6.2)
SODIUM SERPL-SCNC: 137 MMOL/L (ref 136–145)
UNIT NUMBER: NORMAL
WBC # BLD AUTO: 2.62 K/UL (ref 3.9–12.7)

## 2025-05-09 PROCEDURE — 63600175 PHARM REV CODE 636 W HCPCS: Performed by: INTERNAL MEDICINE

## 2025-05-09 PROCEDURE — 82272 OCCULT BLD FECES 1-3 TESTS: CPT | Performed by: FAMILY MEDICINE

## 2025-05-09 PROCEDURE — 94660 CPAP INITIATION&MGMT: CPT

## 2025-05-09 PROCEDURE — 83735 ASSAY OF MAGNESIUM: CPT | Performed by: NURSE PRACTITIONER

## 2025-05-09 PROCEDURE — 80069 RENAL FUNCTION PANEL: CPT | Performed by: NURSE PRACTITIONER

## 2025-05-09 PROCEDURE — 36415 COLL VENOUS BLD VENIPUNCTURE: CPT | Performed by: INTERNAL MEDICINE

## 2025-05-09 PROCEDURE — 25000003 PHARM REV CODE 250: Performed by: NURSE PRACTITIONER

## 2025-05-09 PROCEDURE — 99232 SBSQ HOSP IP/OBS MODERATE 35: CPT | Mod: FS,,, | Performed by: INTERNAL MEDICINE

## 2025-05-09 PROCEDURE — 85007 BL SMEAR W/DIFF WBC COUNT: CPT | Performed by: INTERNAL MEDICINE

## 2025-05-09 PROCEDURE — 86335 IMMUNFIX E-PHORSIS/URINE/CSF: CPT | Performed by: INTERNAL MEDICINE

## 2025-05-09 PROCEDURE — 99900035 HC TECH TIME PER 15 MIN (STAT)

## 2025-05-09 PROCEDURE — 85060 BLOOD SMEAR INTERPRETATION: CPT | Mod: ,,, | Performed by: PATHOLOGY

## 2025-05-09 PROCEDURE — 36415 COLL VENOUS BLD VENIPUNCTURE: CPT | Performed by: NURSE PRACTITIONER

## 2025-05-09 PROCEDURE — 63600175 PHARM REV CODE 636 W HCPCS: Performed by: NURSE PRACTITIONER

## 2025-05-09 PROCEDURE — 82550 ASSAY OF CK (CPK): CPT | Performed by: INTERNAL MEDICINE

## 2025-05-09 PROCEDURE — 36430 TRANSFUSION BLD/BLD COMPNT: CPT

## 2025-05-09 PROCEDURE — P9016 RBC LEUKOCYTES REDUCED: HCPCS | Performed by: FAMILY MEDICINE

## 2025-05-09 PROCEDURE — 86920 COMPATIBILITY TEST SPIN: CPT | Performed by: FAMILY MEDICINE

## 2025-05-09 PROCEDURE — 63600175 PHARM REV CODE 636 W HCPCS: Performed by: FAMILY MEDICINE

## 2025-05-09 PROCEDURE — 25000003 PHARM REV CODE 250: Performed by: INTERNAL MEDICINE

## 2025-05-09 PROCEDURE — 21400001 HC TELEMETRY ROOM

## 2025-05-09 RX ORDER — MAGNESIUM SULFATE HEPTAHYDRATE 40 MG/ML
2 INJECTION, SOLUTION INTRAVENOUS ONCE
Status: COMPLETED | OUTPATIENT
Start: 2025-05-09 | End: 2025-05-09

## 2025-05-09 RX ORDER — SEVELAMER CARBONATE 800 MG/1
800 TABLET, FILM COATED ORAL
Status: DISCONTINUED | OUTPATIENT
Start: 2025-05-09 | End: 2025-05-10

## 2025-05-09 RX ORDER — HYDROCODONE BITARTRATE AND ACETAMINOPHEN 500; 5 MG/1; MG/1
TABLET ORAL
Status: DISCONTINUED | OUTPATIENT
Start: 2025-05-09 | End: 2025-05-13 | Stop reason: HOSPADM

## 2025-05-09 RX ADMIN — INSULIN ASPART 1 UNITS: 100 INJECTION, SOLUTION INTRAVENOUS; SUBCUTANEOUS at 09:05

## 2025-05-09 RX ADMIN — INSULIN ASPART 4 UNITS: 100 INJECTION, SOLUTION INTRAVENOUS; SUBCUTANEOUS at 05:05

## 2025-05-09 RX ADMIN — METOPROLOL SUCCINATE 25 MG: 25 TABLET, EXTENDED RELEASE ORAL at 09:05

## 2025-05-09 RX ADMIN — SEVELAMER CARBONATE 800 MG: 800 TABLET, FILM COATED ORAL at 12:05

## 2025-05-09 RX ADMIN — MAGNESIUM SULFATE HEPTAHYDRATE 2 G: 40 INJECTION, SOLUTION INTRAVENOUS at 10:05

## 2025-05-09 RX ADMIN — MYCOPHENOLATE MOFETIL 500 MG: 250 CAPSULE ORAL at 09:05

## 2025-05-09 RX ADMIN — SODIUM CHLORIDE: 9 INJECTION, SOLUTION INTRAVENOUS at 08:05

## 2025-05-09 RX ADMIN — INSULIN ASPART 4 UNITS: 100 INJECTION, SOLUTION INTRAVENOUS; SUBCUTANEOUS at 11:05

## 2025-05-09 RX ADMIN — APIXABAN 5 MG: 2.5 TABLET, FILM COATED ORAL at 09:05

## 2025-05-09 RX ADMIN — TACROLIMUS 2 MG: 1 CAPSULE ORAL at 09:05

## 2025-05-09 RX ADMIN — MAGNESIUM SULFATE HEPTAHYDRATE 2 G: 40 INJECTION, SOLUTION INTRAVENOUS at 12:05

## 2025-05-09 RX ADMIN — ASPIRIN 81 MG: 81 TABLET, COATED ORAL at 09:05

## 2025-05-09 RX ADMIN — FEBUXOSTAT 40 MG: 40 TABLET, FILM COATED ORAL at 10:05

## 2025-05-09 RX ADMIN — FAMOTIDINE 20 MG: 20 TABLET, FILM COATED ORAL at 09:05

## 2025-05-09 RX ADMIN — TACROLIMUS 2 MG: 1 CAPSULE ORAL at 06:05

## 2025-05-09 RX ADMIN — SODIUM FERRIC GLUCONATE COMPLEX 125 MG: 12.5 INJECTION INTRAVENOUS at 09:05

## 2025-05-09 RX ADMIN — INSULIN GLARGINE 5 UNITS: 100 INJECTION, SOLUTION SUBCUTANEOUS at 09:05

## 2025-05-09 RX ADMIN — CALCITRIOL CAPSULES 0.25 MCG 0.25 MCG: 0.25 CAPSULE ORAL at 09:05

## 2025-05-09 RX ADMIN — PREDNISONE 5 MG: 5 TABLET ORAL at 09:05

## 2025-05-09 RX ADMIN — SEVELAMER CARBONATE 800 MG: 800 TABLET, FILM COATED ORAL at 05:05

## 2025-05-09 RX ADMIN — INSULIN GLARGINE 5 UNITS: 100 INJECTION, SOLUTION SUBCUTANEOUS at 10:05

## 2025-05-09 NOTE — ASSESSMENT & PLAN NOTE
ANGELITO is likely due to CHF Baseline creatinine is 1.9-2.8. Most recent creatinine and eGFR are listed below.  Recent Labs     05/07/25  0417 05/08/25  0506 05/09/25  0704   CREATININE 5.7* 5.8* 5.4*   EGFRNORACEVR 10* 10* 11*      Plan  - ANGELITO is worsening. Will continue current treatment  - Avoid nephrotoxins and renally dose meds for GFR listed above  - Monitor urine output, serial BMP, and adjust therapy as needed  Urinalysis with trace protein trace blood, urine protein creatinine ratio 0.49  Continue intravenous fluids  Nephrology following

## 2025-05-09 NOTE — ASSESSMENT & PLAN NOTE
"Patient has Combined Systolic and Diastolic heart failure that is Acute on chronic. On presentation their CHF was decompensated. Evidence of decompensated CHF on presentation includes: edema, elevated JVD, and worsening renal function. The etiology of their decompensation is likely dietary indiscretion and increased fluid intake. Most recent BNP and echo results are listed below.  No results for input(s): "BNP" in the last 72 hours.    Latest ECHO  Results for orders placed during the hospital encounter of 03/13/25    Echo Saline Bubble? No    Interpretation Summary    Left Ventricle: The left ventricle is normal in size. Mildly increased ventricular mass. Mildly increased wall thickness. There is mild concentric hypertrophy. Normal wall motion. Septal motion is consistent with bundle branch block. There is moderately reduced systolic function with a visually estimated ejection fraction of 35 - 40%. Grade I diastolic dysfunction.    Right Ventricle: The right ventricle is normal in size. Wall thickness is normal. Systolic function is normal.    Left Atrium: Mildly dilated    Aorta: Aortic annulus is mildly dilated measuring 4.01 cm. Ascending aorta is normal measuring 3.69 cm.    Pulmonary Artery: The estimated pulmonary artery systolic pressure is 24 mmHg.    IVC/SVC: Normal venous pressure at 3 mmHg.    Current Heart Failure Medications  metoprolol succinate (TOPROL-XL) 24 hr tablet 25 mg, Daily, Oral    Plan  - Monitor strict I&Os and daily weights.    - Place on telemetry  - Low sodium diet  - Place on fluid restriction of 1.5 L.   - Cardiology has not been consulted  - The patient's volume status is worsening as indicated by edema and elevated JVD. Will continue current treatment     Resolved       " [Cerumen in canal] : cerumen in canal [Bilateral] : (bilateral) [Mucoid Discharge] : mucoid discharge [Erythematous Oropharynx] : erythematous oropharynx [Enlarged Tonsils] : enlarged tonsils [NL] : warm, clear [Pain with manipulation of pinna] : no pain with manipulation of pinna

## 2025-05-09 NOTE — PROGRESS NOTES
C3 nurse attempted to contact Mitch Whittaker  for a TCC post hospital discharge follow up call. No answer. LVM requesting a callback at [phone].    The patient does not have a scheduled HOSFU appointment. Message sent to PCP's staff to assist with HOSFU appointment scheduling.

## 2025-05-09 NOTE — ASSESSMENT & PLAN NOTE
Patient with known non obstructive CAD , which is controlled Will continue BB, ASA, and Statin and monitor for S/Sx of angina/ACS. Continue to monitor on telemetry.   Hb 6.8 warranting another blood transfusion  Patient agreeable

## 2025-05-09 NOTE — ASSESSMENT & PLAN NOTE
Patient's FSGs are controlled on current medication regimen.  Last A1c reviewed-   Lab Results   Component Value Date    HGBA1C 6.3 (H) 05/07/2025     Most recent fingerstick glucose reviewed-   Recent Labs   Lab 05/08/25  1617 05/08/25  2111 05/09/25  0623 05/09/25  1124   POCTGLUCOSE 170* 230* 178* 313*     Current correctional scale  Low  Maintain anti-hyperglycemic dose as follows-   Antihyperglycemics (From admission, onward)      Start     Stop Route Frequency Ordered    05/06/25 2100  insulin glargine U-100 (Lantus) pen 5 Units         -- SubQ 2 times daily 05/06/25 1644    05/06/25 1813  insulin aspart U-100 pen 0-5 Units         -- SubQ Before meals & nightly PRN 05/06/25 1713          Hold Oral hypoglycemics while patient is in the hospital.    Cont Lantus- titrate   Hyperglycemia, on prednisone  Adjust insulin as needed

## 2025-05-09 NOTE — SUBJECTIVE & OBJECTIVE
Interval History: See hospital course for today      Review of Systems   Constitutional:  Positive for activity change (chronically bedbound). Negative for fever.   Respiratory:  Negative for shortness of breath.    Cardiovascular:  Positive for leg swelling.   Gastrointestinal:  Negative for abdominal pain, blood in stool, nausea and vomiting.   Musculoskeletal:  Positive for arthralgias.   Allergic/Immunologic: Positive for immunocompromised state.   Neurological:  Positive for weakness (chronic).   Psychiatric/Behavioral:  Positive for dysphoric mood. Negative for sleep disturbance.      Objective:     Vital Signs (Most Recent):  Temp: 98.2 °F (36.8 °C) (05/09/25 1310)  Pulse: 91 (05/09/25 1310)  Resp: 18 (05/09/25 1310)  BP: (!) 145/65 (05/09/25 1310)  SpO2: 96 % (05/09/25 1310) Vital Signs (24h Range):  Temp:  [97.4 °F (36.3 °C)-98.2 °F (36.8 °C)] 98.2 °F (36.8 °C)  Pulse:  [] 91  Resp:  [17-20] 18  SpO2:  [96 %-100 %] 96 %  BP: (113-145)/(63-84) 145/65     Weight: 92.5 kg (203 lb 14.8 oz)  Body mass index is 27.66 kg/m².    Intake/Output Summary (Last 24 hours) at 5/9/2025 1349  Last data filed at 5/9/2025 0659  Gross per 24 hour   Intake 1878.12 ml   Output 321 ml   Net 1557.12 ml         Physical Exam  Vitals and nursing note reviewed.   Constitutional:       General: He is sleeping. He is not in acute distress.     Appearance: He is ill-appearing. He is not toxic-appearing.   HENT:      Head: Normocephalic and atraumatic.   Cardiovascular:      Rate and Rhythm: Normal rate.   Pulmonary:      Effort: Pulmonary effort is normal. No respiratory distress.      Breath sounds: No wheezing or rales.   Abdominal:      Palpations: Abdomen is soft.      Tenderness: There is no abdominal tenderness.   Musculoskeletal:      Left shoulder: Decreased range of motion.      Right lower leg: Edema present.      Left lower leg: Edema present.   Skin:     General: Skin is warm.   Neurological:      Mental Status: He is  easily aroused.      Motor: Weakness present.   Psychiatric:         Mood and Affect: Mood is depressed.               Significant Labs: All pertinent labs within the past 24 hours have been reviewed.  A1C:   Recent Labs   Lab 12/17/24  0933 05/07/25  0417   HGBA1C 5.7* 6.3*     CBC:   Recent Labs   Lab 05/08/25  0506 05/09/25  0534   WBC 3.28* 2.62*   HGB 8.7* 6.8*   HCT 29.4* 24.2*    310     CMP:   Recent Labs   Lab 05/08/25  0506 05/09/25  0704    137   K 4.7 4.4    102   CO2 24 21*   * 171*   BUN 83* 87*   CREATININE 5.8* 5.4*   CALCIUM 9.3 9.2   PROT 6.3  --    ALBUMIN 1.5* 1.5*   BILITOT 0.4  --    ALKPHOS 237*  --    AST 32  --    ALT 30  --    ANIONGAP 13 14     Tacrolimus 10    Significant Imaging: I have reviewed all pertinent imaging results/findings within the past 24 hours.

## 2025-05-09 NOTE — PLAN OF CARE
[id number]/A237 SB Whittaker is a 69 y.o.male admitted on 5/6/2025 for CKD (chronic kidney disease) stage 4, GFR 15-29 ml/min   Code Status: Full Code MRN: [id number]   Review of patient's allergies indicates:   Allergen Reactions    Lisinopril Other (See Comments)     Other reaction(s):  cough    Actos  [pioglitazone] Other (See Comments)     Other reaction(s): CHF    Metformin Other (See Comments)     Other reaction(s): renal insuff  Other reaction(s): CHF     Past Medical History:   Diagnosis Date    Acquired renal cyst of left kidney     Anemia associated with chronic renal failure     CAD (coronary artery disease)     nonobstructive lhc 9/14    CHF (congestive heart failure)     Chronic immunosuppression with Prograf and MMF 06/18/2015    Chronic venous insufficiency of lower extremity     CKD (chronic kidney disease) stage 3, GFR 30-59 ml/min     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 RNA NEGATIVE 5/2015, 2021, 2022    History of colon polyps     HPTH (hyperparathyroidism)     Hyperlipidemia     Hypertension associated with stage 3 chronic kidney disease due to type 2 diabetes mellitus     LBBB (left bundle branch block) 12/20/2021    Morbid obesity with BMI of 45.0-49.9, adult     Nephrolithiasis 6/7/2013    PCO (posterior capsular opacification), left 03/04/2019    Proteinuria     resolved s/p transplant    S/P kidney transplant     Sleep apnea     Type 2 diabetes, uncontrolled, with retinopathy     Type II diabetes mellitus with renal manifestations       PRN meds    0.9%  NaCl infusion (for blood administration), , Q24H PRN  acetaminophen, 650 mg, Q8H PRN  dextrose  50%, 12.5 g, PRN  dextrose 50%, 25 g, PRN  glucagon (human recombinant), 1 mg, PRN  glucose, 16 g, PRN  glucose, 24 g, PRN  insulin aspart U-100, 0-5 Units, QID (AC + HS) PRN  ondansetron, 4 mg, Q12H PRN  prochlorperazine, 5 mg, Q6H PRN  sodium chloride 0.9%, 10 mL, PRN      Chart check completed. Will continue plan of care.      Orientation: oriented x 4  [place] Coma Scale Score: 15     Lead Monitored: Lead II Rhythm: normal sinus rhythm    Cardiac/Telemetry Box Number: [id number]  VTE Core Measure: Pharmacological prophylaxis initiated/maintained Last Bowel Movement: 05/08/25  Diet Consistent Carbohydrate 2000 Calories (up to 75 gm per meal); Heart Healthy; Fluid - 1500mL  Voiding Characteristics: incontinence  [place] Score: 15  Fall Risk Score: 11  Accucheck []   Freq?      Lines/Drains/Airways       Peripheral Intravenous Line  Duration                  Hemodialysis AV Fistula Right upper arm -- days         Peripheral IV - Single Lumen 05/06/25 1312 20 G 1 1/4 in Left Antecubital 2 days         Peripheral IV - Single Lumen 05/07/25 1558 22 G Left;Posterior Hand 1 day

## 2025-05-09 NOTE — PROGRESS NOTES
"[facility] - [facility] ([facility] Medicine  Progress Note    Patient Name: Mitch Whittaker  MRN: [id number]  Patient Class: IP- Inpatient   Admission Date: 5/6/2025  Length of Stay: 3 days  Attending Physician: Lindsey Ferreira MD  Primary Care Provider: Valery Caal MD        Subjective     Principal Problem:CKD (chronic kidney disease) stage 4, GFR 15-29 ml/min        HPI:  The patient is a 70yo male with CAD, Combined CHF EF 35 - 40%, s/p Renal transplant 2015, Chronic immunosuppression, CKD3, DM, Anemia, HLD, HTN, Hx DVT- on Eliquis, Gout, Chronic venous stasis and recurrent skin infections, and Morbid obesity who presented to the ED with abdominal pain. Pt reports lower back pain radiating to his abdomen describes as an aching pain rated 8/10 that has occurred on/off for the last 3 days. Pt denies fever, chills, chest pain, SOB, cough, N/V, Diarrhea, or constipation. He reports a normal BM yesterday. Pt also complains of worsening weakness and pain to arms and legs which he attributs to gout. He reports pain is worse to his hands, left knee, and ankles. He reports his left knee has been swollen "for awhile". He reports chronic swelling to BLE. Pt is bed bound at home and lives with his wife and daughters who are his caretakers.   Pt reports abdominal pain has resolved since arrival to ED     In the ED, Afebrile, VSS, oxygenation normal. Labs revealed mild leukopenia with WBC 3.4, Hgb 7.3, BUN 79, sCr 5.7 (baseline 1.9-2.8), Albumin 1.5, , Trop 0.067>0.062. EKG showed sinus tachycardia -rate 108, PACs, nonspecific intraventricular block   CT chest/abd/pelvis showed Small left pleural effusion, Nonspecific distention of the gallbladder, No gallbladder wall thickening or bile duct dilatation, Fatty liver, Native kidneys are small and atrophic, No hydronephrosis, No ureteral stones, Right renal transplant.    Ed provider discussed care with nephrologist, Dr. Gonsalez who recommended IV Lasix drip plus " metolazone as needed 5-10 mg per dose     Overview/Hospital Course:  Patient is a 69-year-old male with a history of coronary artery disease, biventricular CHF with an EF of 35-40%, status post living related donor kidney transplant 2015 on chronic immunosuppression, ANGELITO on CKD 3, diabetes mellitus, anemia, hypertension, history of DVT on Eliquis, gout, chronic venous stasis, morbid obesity.  Patient presented to the emergency department complaining of back pain radiating to his abdomen 8/10.  He reports it is has been occurring for approximately 3 days prior to admission.  Health who complains of generalized body ache worse pain in his legs and knees.  He states this has been going on for quite some time.  Emergency department labs revealed a white count of 3.4 hemoglobin 7.3 creatinine of 5.7 which is up from about 3.  Albumin is 1.5.  CT scan chest abdomen and pelvis revealed a small left pleural effusion otherwise none specific.    Patient was seen by his nephrologist Dr. Gonsalez who initiated Lasix drip with metolazone.  After review by rounding nephrologist it was felt that he was maybe slightly over diuresed.  Diuretics were held and patient was started on normal saline at 100 cc an hour for 2 L.  Prograf level noted to be 10 and Prograf decreased to 2 mg b.i.d. recheck level in a.m.  Markedly elevated uric acid, patient was initiated on Uloric.  Seen and examined by Physical therapy/Occupational therapy who felt that patient was at his baseline status and declined further intervention.      Echo:    Left Ventricle: The left ventricle is normal in size. Moderately increased ventricular mass. Moderately increased wall thickness. There is moderate concentric hypertrophy. Normal wall motion. Septal motion is consistent with bundle branch block. There is moderately reduced systolic function with a visually estimated ejection fraction of 35 - 40%. Grade I diastolic dysfunction.    Right Ventricle: The right  ventricle is normal in size Wall thickness is normal. Systolic function is normal.    Mitral Valve: Mildly calcified leaflets. There is mild mitral annular calcification.    Tricuspid Valve: There is mild regurgitation.    Aorta: The aortic annulus is mildly dilated measuring 4.0 cm. The aortic root is normal in size. The ascending aorta is normal in size measuring 3.3 cm.  5/9 agreeable to blood transfusion. Denies overt signs or symptoms of bleeding. Renal function stable. Continue fluids. Nephrology managing immunosuppressant medication(s)     Interval History: See hospital course for today      Review of Systems   Constitutional:  Positive for activity change (chronically bedbound). Negative for fever.   Respiratory:  Negative for shortness of breath.    Cardiovascular:  Positive for leg swelling.   Gastrointestinal:  Negative for abdominal pain, blood in stool, nausea and vomiting.   Musculoskeletal:  Positive for arthralgias.   Allergic/Immunologic: Positive for immunocompromised state.   Neurological:  Positive for weakness (chronic).   Psychiatric/Behavioral:  Positive for dysphoric mood. Negative for sleep disturbance.      Objective:     Vital Signs (Most Recent):  Temp: 98.2 °F (36.8 °C) (05/09/25 1310)  Pulse: 91 (05/09/25 1310)  Resp: 18 (05/09/25 1310)  BP: (!) 145/65 (05/09/25 1310)  SpO2: 96 % (05/09/25 1310) Vital Signs (24h Range):  Temp:  [97.4 °F (36.3 °C)-98.2 °F (36.8 °C)] 98.2 °F (36.8 °C)  Pulse:  [] 91  Resp:  [17-20] 18  SpO2:  [96 %-100 %] 96 %  BP: (113-145)/(63-84) 145/65     Weight: 92.5 kg (203 lb 14.8 oz)  Body mass index is 27.66 kg/m².    Intake/Output Summary (Last 24 hours) at 5/9/2025 1349  Last data filed at 5/9/2025 0659  Gross per 24 hour   Intake 1878.12 ml   Output 321 ml   Net 1557.12 ml         Physical Exam  Vitals and nursing note reviewed.   Constitutional:       General: He is sleeping. He is not in acute distress.     Appearance: He is ill-appearing. He is not  "toxic-appearing.   HENT:      Head: Normocephalic and atraumatic.   Cardiovascular:      Rate and Rhythm: Normal rate.   Pulmonary:      Effort: Pulmonary effort is normal. No respiratory distress.      Breath sounds: No wheezing or rales.   Abdominal:      Palpations: Abdomen is soft.      Tenderness: There is no abdominal tenderness.   Musculoskeletal:      Left shoulder: Decreased range of motion.      Right lower leg: Edema present.      Left lower leg: Edema present.   Skin:     General: Skin is warm.   Neurological:      Mental Status: He is easily aroused.      Motor: Weakness present.   Psychiatric:         Mood and Affect: Mood is depressed.               Significant Labs: All pertinent labs within the past 24 hours have been reviewed.  A1C:   Recent Labs   Lab 12/17/24  0933 05/07/25  0417   HGBA1C 5.7* 6.3*     CBC:   Recent Labs   Lab 05/08/25  0506 05/09/25  0534   WBC 3.28* 2.62*   HGB 8.7* 6.8*   HCT 29.4* 24.2*    310     CMP:   Recent Labs   Lab 05/08/25  0506 05/09/25  0704    137   K 4.7 4.4    102   CO2 24 21*   * 171*   BUN 83* 87*   CREATININE 5.8* 5.4*   CALCIUM 9.3 9.2   PROT 6.3  --    ALBUMIN 1.5* 1.5*   BILITOT 0.4  --    ALKPHOS 237*  --    AST 32  --    ALT 30  --    ANIONGAP 13 14     Tacrolimus 10    Significant Imaging: I have reviewed all pertinent imaging results/findings within the past 24 hours.      Assessment & Plan  Acute on chronic combined systolic and diastolic congestive heart failure  Patient has Combined Systolic and Diastolic heart failure that is Acute on chronic. On presentation their CHF was decompensated. Evidence of decompensated CHF on presentation includes: edema, elevated JVD, and worsening renal function. The etiology of their decompensation is likely dietary indiscretion and increased fluid intake. Most recent BNP and echo results are listed below.  No results for input(s): "BNP" in the last 72 hours.    Latest ECHO  Results for orders " placed during the hospital encounter of 03/13/25    Echo Saline Bubble? No    Interpretation Summary    Left Ventricle: The left ventricle is normal in size. Mildly increased ventricular mass. Mildly increased wall thickness. There is mild concentric hypertrophy. Normal wall motion. Septal motion is consistent with bundle branch block. There is moderately reduced systolic function with a visually estimated ejection fraction of 35 - 40%. Grade I diastolic dysfunction.    Right Ventricle: The right ventricle is normal in size. Wall thickness is normal. Systolic function is normal.    Left Atrium: Mildly dilated    Aorta: Aortic annulus is mildly dilated measuring 4.01 cm. Ascending aorta is normal measuring 3.69 cm.    Pulmonary Artery: The estimated pulmonary artery systolic pressure is 24 mmHg.    IVC/SVC: Normal venous pressure at 3 mmHg.    Current Heart Failure Medications  metoprolol succinate (TOPROL-XL) 24 hr tablet 25 mg, Daily, Oral    Plan  - Monitor strict I&Os and daily weights.    - Place on telemetry  - Low sodium diet  - Place on fluid restriction of 1.5 L.   - Cardiology has not been consulted  - The patient's volume status is worsening as indicated by edema and elevated JVD. Will continue current treatment     Resolved       ANGELITO on CKD 4  ANGELITO is likely due to CHF Baseline creatinine is 1.9-2.8. Most recent creatinine and eGFR are listed below.  Recent Labs     05/07/25  0417 05/08/25  0506 05/09/25  0704   CREATININE 5.7* 5.8* 5.4*   EGFRNORACEVR 10* 10* 11*      Plan  - ANGELITO is worsening. Will continue current treatment  - Avoid nephrotoxins and renally dose meds for GFR listed above  - Monitor urine output, serial BMP, and adjust therapy as needed  Urinalysis with trace protein trace blood, urine protein creatinine ratio 0.49  Continue intravenous fluids  Nephrology following   Obstructive sleep apnea  CPAP hs    Coronary artery disease of native artery of native heart with stable angina  pectoris  Patient with known non obstructive CAD , which is controlled Will continue BB, ASA, and Statin and monitor for S/Sx of angina/ACS. Continue to monitor on telemetry.   Hb 6.8 warranting another blood transfusion  Patient agreeable   Living-related donor kidney transplant (daughter) - 6/12/15  Nephrology following   Continue Prograf, Prednisone, Cellcept   Prograf level 10 - decreased to 2mg/2mg    Secondary hyperparathyroidism of renal origin  Stable   Cont Calcitriol     Type II diabetes mellitus with renal manifestations  Patient's FSGs are controlled on current medication regimen.  Last A1c reviewed-   Lab Results   Component Value Date    HGBA1C 6.3 (H) 05/07/2025     Most recent fingerstick glucose reviewed-   Recent Labs   Lab 05/08/25  1617 05/08/25  2111 05/09/25  0623 05/09/25  1124   POCTGLUCOSE 170* 230* 178* 313*     Current correctional scale  Low  Maintain anti-hyperglycemic dose as follows-   Antihyperglycemics (From admission, onward)      Start     Stop Route Frequency Ordered    05/06/25 2100  insulin glargine U-100 (Lantus) pen 5 Units         -- SubQ 2 times daily 05/06/25 1644    05/06/25 1813  insulin aspart U-100 pen 0-5 Units         -- SubQ Before meals & nightly PRN 05/06/25 1713          Hold Oral hypoglycemics while patient is in the hospital.    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 The patient's latest [facility]  (Activity Measure for Post Acute Care) Score is listed below.    AM-PAC Score - How much help does the patient need for each activity listed  Basic Mobility Total Score: 6  Turning over in bed (including adjusting bedclothes, sheets and blankets)?: Unable  Sitting down on and standing up from a chair with arms (e.g., wheelchair, bedside commode, etc.): Unable  Moving from lying on back to sitting on the side of the bed?: Unable  Moving to and from a bed to a chair (including a wheelchair)?: Unable  Need to walk in hospital room?: Unable  Climbing 3-5 steps with a railing?: Unable    Plan  - Progressive mobility protocol initated  - Fall precautions in place          Gout  Elevated uric acid, cont Allopurinol   On uloric     History of DVT (deep vein thrombosis)  Stable   Cont Eliquis     Immunosuppression  Nephrology recommended continuing pt's home medications Prograf, Cellcept, and Prednisone     Arthralgia  Uric acid 11.5  Markedly elevated inflammatory markers  Start on Uloric      VTE Risk Mitigation (From admission, onward)           Ordered     apixaban tablet 5 mg  2 times daily         05/06/25 1644     IP VTE HIGH RISK PATIENT  Once         05/06/25 1644     Place sequential compression device  Until discontinued         05/06/25 1644                    Discharge Planning   GRETEL: 5/12/2025     Code Status: Full Code   Medical Readiness for Discharge Date:   Discharge Plan A: Home Health, Home with family                      Lindsey Ferreira MD  Department of Hospital Medicine   [facility] - Tele[facility] ([facility])

## 2025-05-09 NOTE — PROGRESS NOTES
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 Admitted with chest discomfort and shortness of breath. Seen per [facility] 5days PTA with mild CP and SOB. Poorly compliant with fluid restriction and salt intake. Denies pain over RLQ renal allograft. Compliant with immunosuppressives. Initially thought to have some degree of volume overload and started on Lasix gtt. No improvement in renal function. Now on IVFs for suspected overdiuresis.       Interval History     Overnight/currently: Awake and alert. Denies CP or SOB. Breathing comfortably on RA O2. Lungs clear. Making good uop. Still no pain over RLQ renal allograft. Old, clotted RUE AVF. Crt improving with good uop and no overt signs of uremia.       Health Status   Allergies:    is allergic to lisinopril, actos  [pioglitazone], and metformin.    Current medications:   Current Medications[1]     Physical Examination   VS/Measurements   /72 (Patient Position: Lying)   Pulse 96   Temp 97.4 °F (36.3 °C) (Oral)   Resp 18   Ht 6' (1.829 m)   Wt 92.5 kg (203 lb 14.8 oz)   SpO2 97%   BMI 27.66 kg/m²      General:  Alert and oriented X3, No acute distress.         Nutritional status: Obese.    Neck:  Supple, No lymphadenopathy.     Respiratory:  Lungs are clear to auscultation, Respirations are non-labored, Symmetrical chest wall expansion.    Cardiovascular:  Normal rate, Regular rhythm.   Gastrointestinal:  Soft, Non-tender, Normal bowel sounds. RLQ allogaft nontender  Integumentary:  Warm, Dry.   Psychiatric:  Cooperative, Appropriate mood & affect.   Vasc:   RUE AVF pulsatile, clotted     Review / Management   Laboratory Results   Today's  Lab Results :    Recent Results (from the past 24 hours)   Echo    Collection Time: 05/08/25  9:44 AM   Result Value Ref Range    BSA 2.32 m2    LVOT stroke volume 61.5 cm3    LVIDd 5.4 3.5 - 6.0 cm    LV Systolic Volume 100 mL    LV Systolic Volume Index 44.1 mL/m2    LVIDs 4.7 (A) 2.1 - 4.0 cm    LV ESV A2C 80.43 mL    LV Diastolic Volume 140 mL    LV ESV A4C 112.57 mL    LV Diastolic Volume Index 61.67 mL/m2    Left Ventricular End Systolic Volume by Teichholz Method 99.79 mL    Left Ventricular End Diastolic Volume by Teichholz Method 140.28 mL    IVS 1.6 (A) 0.6 - 1.1 cm    LVOT diameter 2.4 cm    LVOT area 4.5 cm2    FS 13.0 (A) 28 - 44 %    Left Ventricle Relative Wall Thickness 0.56 cm    PW 1.5 (A) 0.6 - 1.1 cm    LV mass 380.5 g    LV Mass Index 167.6 g/m2    TDI LATERAL 0.08 m/s    TDI SEPTAL 0.12 m/s    TR Max Hubert 2.5 m/s    IVRT 99 msec    LVOT peak hubert 0.9 m/s    Left Ventricular Outflow Tract Mean Velocity 0.60 cm/s    Left Ventricular Outflow Tract Mean Gradient 1.61 mmHg    RV S' 19.92 cm/s    RVOT peak VTI 14.4 cm    TAPSE 1.7 cm    LA size 3.8 cm    Left Atrium Minor Axis 5.5 cm    Left Atrium Major Axis 6.2 cm    LA Vol (MOD) 99 mL    DONNA (MOD) 44 mL/m2    RA Major Axis 4.35 cm    AV mean gradient 3 mmHg    AV peak gradient 5 mmHg    Ao peak hubert 1.1 m/s    Ao VTI 20.7 cm    LVOT peak VTI 13.6 cm    AV valve area 3.0 cm²    AV Velocity Ratio 0.82     AV index (prosthetic) 0.66     BHARATHI by Velocity Ratio 3.7 cm²    Triscuspid Valve Regurgitation Peak Gradient 25 mmHg    PV mean gradient 1 mmHg    RVOT peak hubert 0.81 m/s    Ao root annulus 4.0 cm    STJ 3.5 cm    Ascending aorta 3.3 cm    ASI 1.5 cm/m2    Mean e' 0.10 m/s    ZLVIDS -0.65     ZLVIDD -4.46     LA area A4C 30.31 cm2    LA area A2C 25.12 cm2    DONNA 25 mL/m2    LA Vol 56 cm3    LA WIDTH 3.0 cm    RA Width 4.1 cm   POCT glucose    Collection Time: 05/08/25 11:41 AM   Result Value Ref Range    POCT Glucose 120 (H) 70 - 110 mg/dL   POCT  glucose    Collection Time: 05/08/25  4:17 PM   Result Value Ref Range    POCT Glucose 170 (H) 70 - 110 mg/dL   POCT glucose    Collection Time: 05/08/25  9:11 PM   Result Value Ref Range    POCT Glucose 230 (H) 70 - 110 mg/dL   Magnesium    Collection Time: 05/09/25  5:34 AM   Result Value Ref Range    Magnesium  1.1 (L) 1.6 - 2.6 mg/dL   POCT glucose    Collection Time: 05/09/25  6:23 AM   Result Value Ref Range    POCT Glucose 178 (H) 70 - 110 mg/dL        Impression and Plan   Diagnosis     ANGELITO  on CAN/CKD 4:  Baseline creatinine 2.0-3.0.  Worsening renal function thought due to overdiuresis. Lasix stopped and IVFs started. Crt improving with good uop.     --FeNa 7.3% indicating ATN      Hx of Kidney transplant: [facility] NO 2015.  Prograf decreased to 2 mg twice daily, Mycophenolate 500 mg twice daily and Prednisone 5 mg daily.    --Prograf level 10.0 5/7, Prograf decreased     Acute on chronic combines systolic/diastolic CHF. Initially volume overloaded and diuresed with Lasix gtt. Now poss overdiuresis and receiving IVFs.     ACKD. Hgb 6.9 5/7, given 1u PRBCs. H&H ok. Follow labs.     2HPT of renal origin. Mildly elevated PO4. Start Renvela.      Hx of DVT. On Eliquis.    HTN. Good control on current meds.     DM2. Hgb A1c 6.3%Per IM    Gout. On Allopurinol. Uric acid    CAD.    MARION.       Reynold Riojas NP 5/9/25 0751hrs         ______________________________________________       [1]   Current Facility-Administered Medications:     0.9%  NaCl infusion (for blood administration), , Intravenous, Q24H PRN, Ann Lazo MD    0.9% NaCl infusion, , Intravenous, Continuous, Noel Jimenes MD, Last Rate: 100 mL/hr at 05/09/25 0506, Rate Verify at 05/09/25 0506    acetaminophen tablet 650 mg, 650 mg, Oral, Q8H PRN, Emma Balbuena, MAGALIE, 650 mg at 05/08/25 0847    apixaban tablet 5 mg, 5 mg, Oral, BID, Emma Balbuena, NP, 5 mg at 05/08/25 2113    aspirin EC tablet 81 mg, 81 mg, Oral, Daily, Sree  Emma LINTON NP, 81 mg at 05/08/25 0849    calcitRIOL capsule 0.25 mcg, 0.25 mcg, Oral, Daily, Emma Balbuena NP, 0.25 mcg at 05/08/25 0849    dextrose 50% injection 12.5 g, 12.5 g, Intravenous, PRN, Emma Balbuena NP    dextrose 50% injection 25 g, 25 g, Intravenous, PRN, Emma Balbuena NP    epoetin jeronimo injection 5,000 Units, 5,000 Units, Subcutaneous, Q7 Days, Hany Gonsalez MD, 5,000 Units at 05/07/25 1602    famotidine tablet 20 mg, 20 mg, Oral, Every other day, Emma Balbuena NP, 20 mg at 05/07/25 0831    febuxostat tablet 40 mg, 40 mg, Oral, Daily, Ann Lazo MD, 40 mg at 05/08/25 0848    ferric gluconate (Ferrlecit) 62.5 mg/5 mL injection 125 mg, 125 mg, Intravenous, Daily, Hany Gonsalez MD, 125 mg at 05/08/25 0847    glucagon (human recombinant) injection 1 mg, 1 mg, Intramuscular, PRN, Emma Balbuena NP    glucose chewable tablet 16 g, 16 g, Oral, PRN, Emma Balbuena NP    glucose chewable tablet 24 g, 24 g, Oral, PRN, Emma Balbuena NP    insulin aspart U-100 pen 0-5 Units, 0-5 Units, Subcutaneous, QID (AC + HS) PRN, Emma Balbuena NP, 1 Units at 05/08/25 2112    insulin glargine U-100 (Lantus) pen 5 Units, 5 Units, Subcutaneous, BID, Emma Balbuena NP, 5 Units at 05/08/25 2112    metoprolol succinate (TOPROL-XL) 24 hr tablet 25 mg, 25 mg, Oral, Daily, Emma Balbuena NP, 25 mg at 05/07/25 0932    mycophenolate capsule 500 mg, 500 mg, Oral, BID, Hany Gonsalez MD, 500 mg at 05/08/25 2113    ondansetron injection 4 mg, 4 mg, Intravenous, Q12H PRN, Emma Balbuena, NP    polyethylene glycol packet 17 g, 17 g, Oral, Daily, Emma Balbuena NP, 17 g at 05/08/25 0849    predniSONE tablet 5 mg, 5 mg, Oral, Daily, Hany Gonsalez MD, 5 mg at 05/08/25 0849    prochlorperazine injection Soln 5 mg, 5 mg, Intravenous, Q6H PRN, Emma Balbuena, NP    senna-docusate 8.6-50 mg per tablet 1 tablet, 1 tablet, Oral, BID, Emma Balbuena NP, 1 tablet at 05/08/25 2113    sodium  chloride 0.9% flush 10 mL, 10 mL, Intravenous, PRN, Emma Balbuena, NP    tacrolimus capsule 2 mg, 2 mg, Oral, BID, Ann Lazo MD, 2 mg at 05/08/25 1717

## 2025-05-10 LAB
ABSOLUTE NEUTROPHIL MANUAL (OHS): 1.7 K/UL
ALBUMIN SERPL BCP-MCNC: 1.6 G/DL (ref 3.5–5.2)
ANION GAP (OHS): 13 MMOL/L (ref 8–16)
BUN SERPL-MCNC: 78 MG/DL (ref 8–23)
CALCIUM SERPL-MCNC: 9.5 MG/DL (ref 8.7–10.5)
CHLORIDE SERPL-SCNC: 104 MMOL/L (ref 95–110)
CO2 SERPL-SCNC: 21 MMOL/L (ref 23–29)
CREAT SERPL-MCNC: 4.6 MG/DL (ref 0.5–1.4)
EOSINOPHIL NFR BLD MANUAL: 2 % (ref 0–8)
ERYTHROCYTE [DISTWIDTH] IN BLOOD BY AUTOMATED COUNT: 17.2 % (ref 11.5–14.5)
GFR SERPLBLD CREATININE-BSD FMLA CKD-EPI: 13 ML/MIN/1.73/M2
GLUCOSE SERPL-MCNC: 193 MG/DL (ref 70–110)
HAPTOGLOB SERPL-MCNC: 531 MG/DL (ref 30–250)
HCT VFR BLD AUTO: 32.3 % (ref 40–54)
HGB BLD-MCNC: 9.5 GM/DL (ref 14–18)
LDH SERPL-CCNC: 370 U/L (ref 110–260)
LYMPHOCYTES NFR BLD MANUAL: 34 % (ref 18–48)
MAGNESIUM SERPL-MCNC: 2.1 MG/DL (ref 1.6–2.6)
MCH RBC QN AUTO: 24.1 PG (ref 27–31)
MCHC RBC AUTO-ENTMCNC: 29.4 G/DL (ref 32–36)
MCV RBC AUTO: 82 FL (ref 82–98)
MONOCYTES NFR BLD MANUAL: 6 % (ref 4–15)
NEUTROPHILS NFR BLD MANUAL: 58 % (ref 38–73)
NUCLEATED RBC (/100WBC) (OHS): 0 /100 WBC
PHOSPHATE SERPL-MCNC: 4.1 MG/DL (ref 2.7–4.5)
PLATELET # BLD AUTO: 329 K/UL (ref 150–450)
PLATELET BLD QL SMEAR: NORMAL
PMV BLD AUTO: 9.6 FL (ref 9.2–12.9)
POCT GLUCOSE: 215 MG/DL (ref 70–110)
POCT GLUCOSE: 229 MG/DL (ref 70–110)
POCT GLUCOSE: 257 MG/DL (ref 70–110)
POCT GLUCOSE: 283 MG/DL (ref 70–110)
POTASSIUM SERPL-SCNC: 4.4 MMOL/L (ref 3.5–5.1)
RBC # BLD AUTO: 3.95 M/UL (ref 4.6–6.2)
SODIUM SERPL-SCNC: 138 MMOL/L (ref 136–145)
WBC # BLD AUTO: 2.99 K/UL (ref 3.9–12.7)

## 2025-05-10 PROCEDURE — 94660 CPAP INITIATION&MGMT: CPT

## 2025-05-10 PROCEDURE — 63600175 PHARM REV CODE 636 W HCPCS: Performed by: INTERNAL MEDICINE

## 2025-05-10 PROCEDURE — 99232 SBSQ HOSP IP/OBS MODERATE 35: CPT | Mod: ,,, | Performed by: INTERNAL MEDICINE

## 2025-05-10 PROCEDURE — 80069 RENAL FUNCTION PANEL: CPT | Performed by: NURSE PRACTITIONER

## 2025-05-10 PROCEDURE — 99223 1ST HOSP IP/OBS HIGH 75: CPT | Mod: ,,, | Performed by: INTERNAL MEDICINE

## 2025-05-10 PROCEDURE — 25000003 PHARM REV CODE 250: Performed by: NURSE PRACTITIONER

## 2025-05-10 PROCEDURE — 83010 ASSAY OF HAPTOGLOBIN QUANT: CPT | Performed by: INTERNAL MEDICINE

## 2025-05-10 PROCEDURE — 21400001 HC TELEMETRY ROOM

## 2025-05-10 PROCEDURE — 36415 COLL VENOUS BLD VENIPUNCTURE: CPT | Performed by: INTERNAL MEDICINE

## 2025-05-10 PROCEDURE — 86334 IMMUNOFIX E-PHORESIS SERUM: CPT | Performed by: INTERNAL MEDICINE

## 2025-05-10 PROCEDURE — 83735 ASSAY OF MAGNESIUM: CPT | Performed by: NURSE PRACTITIONER

## 2025-05-10 PROCEDURE — 83615 LACTATE (LD) (LDH) ENZYME: CPT | Performed by: INTERNAL MEDICINE

## 2025-05-10 PROCEDURE — 94761 N-INVAS EAR/PLS OXIMETRY MLT: CPT

## 2025-05-10 PROCEDURE — 83521 IG LIGHT CHAINS FREE EACH: CPT | Performed by: INTERNAL MEDICINE

## 2025-05-10 PROCEDURE — 99900035 HC TECH TIME PER 15 MIN (STAT)

## 2025-05-10 PROCEDURE — 25000003 PHARM REV CODE 250: Performed by: INTERNAL MEDICINE

## 2025-05-10 PROCEDURE — 85025 COMPLETE CBC W/AUTO DIFF WBC: CPT | Performed by: FAMILY MEDICINE

## 2025-05-10 PROCEDURE — 36415 COLL VENOUS BLD VENIPUNCTURE: CPT | Performed by: FAMILY MEDICINE

## 2025-05-10 RX ADMIN — INSULIN ASPART 3 UNITS: 100 INJECTION, SOLUTION INTRAVENOUS; SUBCUTANEOUS at 04:05

## 2025-05-10 RX ADMIN — INSULIN GLARGINE 5 UNITS: 100 INJECTION, SOLUTION SUBCUTANEOUS at 09:05

## 2025-05-10 RX ADMIN — APIXABAN 5 MG: 2.5 TABLET, FILM COATED ORAL at 09:05

## 2025-05-10 RX ADMIN — TACROLIMUS 2 MG: 1 CAPSULE ORAL at 05:05

## 2025-05-10 RX ADMIN — INSULIN ASPART 2 UNITS: 100 INJECTION, SOLUTION INTRAVENOUS; SUBCUTANEOUS at 05:05

## 2025-05-10 RX ADMIN — INSULIN ASPART 1 UNITS: 100 INJECTION, SOLUTION INTRAVENOUS; SUBCUTANEOUS at 09:05

## 2025-05-10 RX ADMIN — MYCOPHENOLATE MOFETIL 500 MG: 250 CAPSULE ORAL at 09:05

## 2025-05-10 RX ADMIN — TACROLIMUS 2 MG: 1 CAPSULE ORAL at 09:05

## 2025-05-10 RX ADMIN — ASPIRIN 81 MG: 81 TABLET, COATED ORAL at 09:05

## 2025-05-10 RX ADMIN — PREDNISONE 5 MG: 5 TABLET ORAL at 09:05

## 2025-05-10 RX ADMIN — SODIUM FERRIC GLUCONATE COMPLEX 125 MG: 12.5 INJECTION INTRAVENOUS at 09:05

## 2025-05-10 RX ADMIN — INSULIN ASPART 3 UNITS: 100 INJECTION, SOLUTION INTRAVENOUS; SUBCUTANEOUS at 11:05

## 2025-05-10 RX ADMIN — METOPROLOL SUCCINATE 25 MG: 25 TABLET, EXTENDED RELEASE ORAL at 09:05

## 2025-05-10 RX ADMIN — FEBUXOSTAT 40 MG: 40 TABLET, FILM COATED ORAL at 09:05

## 2025-05-10 NOTE — ASSESSMENT & PLAN NOTE
Differential Diagnosis:  Prograf toxicity contributing to anemia via hematuria and leukopenia.  Chronic kidney disease (CKD)-related anemia due to impaired erythropoiesis.  Gastrointestinal bleeding (occult or upper GI source)--considering history of black stools. Will proceed with EGD/Colon in the morning.   Drug-induced anemia--Eliquis, though recently discontinued, may have contributed.  Iron-deficiency anemia--prior EGD in March 2024 was normal.  Malignancy-related anemia--less likely but should be ruled out.  CMV reactivation--unlikely given previously undetectable levels.    Plan & Recommendations:  Adjust immunosuppression: Lower Prograf dose given elevated levels (33).  Manage hyperkalemia: Follow nephrology recommendations.  Evaluate anemia: Serial Hgb monitoring, iron studies, reticulocyte count.   GI workup: Will perform upper endoscopy and colonoscopy due to recurring and persistent worsening anemia.  Supportive care: Maintain hydration, assess renal function, monitor stool output.  Hematology consultation: If anemia worsens or alternative causes suspected.

## 2025-05-10 NOTE — SUBJECTIVE & OBJECTIVE
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 RNA NEGATIVE 5/2015, 2021, 2022    History of colon polyps     HPTH (hyperparathyroidism)     Hyperlipidemia     Hypertension associated with stage 3 chronic kidney disease due to type 2 diabetes mellitus     LBBB (left bundle branch block) 12/20/2021    Morbid obesity with BMI of 45.0-49.9, adult     Nephrolithiasis 6/7/2013    PCO (posterior capsular opacification), left 03/04/2019    Proteinuria     resolved s/p transplant    S/P kidney transplant     Sleep apnea     Type 2 diabetes, uncontrolled, with retinopathy     Type II diabetes mellitus with renal manifestations        Past Surgical History:   Procedure Laterality Date    CARDIAC CATHETERIZATION  01/01/2008    normal coronary    CARPAL TUNNEL RELEASE Right 12/01/2023    Procedure: RELEASE, CARPAL TUNNEL;  Surgeon: Noel Almonte MD;  Location: [facility] OR;  Service: Orthopedics;  Laterality: Right;    CARPAL TUNNEL RELEASE Left 7/18/2024    Procedure: RELEASE, CARPAL TUNNEL;  Surgeon: Noel Almonte MD;  Location: [facility] OR;  Service: Orthopedics;  Laterality: Left;    COLONOSCOPY N/A 04/05/2018    Procedure: COLONOSCOPY;  Surgeon: Chava Ronquillo MD;  Location: [facility] ENDO;   Service: Endoscopy;  Laterality: N/A;    COLONOSCOPY N/A 2022    Procedure: COLONOSCOPY;  Surgeon: Alix Puente MD;  Location: [facility];  Service: Endoscopy;  Laterality: N/A;    COLONOSCOPY N/A 2023    Procedure: COLONOSCOPY - rule out CMV  Cardiac clearance/Eliquis hold approval received on 23 per Dr. Meade, cardiology.  Note in encounters.  LB;  Surgeon: Daniella Shah MD;  Location: [facility];  Service: Endoscopy;  Laterality: N/A;    ESOPHAGOGASTRODUODENOSCOPY N/A 2024    Procedure: EGD (ESOPHAGOGASTRODUODENOSCOPY) 3/1-pt cleared, ok to hold eliquis for 3 days;  Surgeon: Daniella Shah MD;  Location: [facility];  Service: Endoscopy;  Laterality: N/A;    KIDNEY TRANSPLANT      RETINAL LASER PROCEDURE         Review of patient's allergies indicates:   Allergen Reactions    Lisinopril Other (See Comments)     Other reaction(s):  cough    Actos  [pioglitazone] Other (See Comments)     Other reaction(s): CHF    Metformin Other (See Comments)     Other reaction(s): renal insuff  Other reaction(s): CHF     Family History       Problem Relation (Age of Onset)    Diabetes Mother, Sister, Maternal Grandmother    Heart failure Mother, Father    Hypertension Mother    Kidney disease Sister          Tobacco Use    Smoking status: Former     Current packs/day: 0.00     Types: Cigarettes     Quit date: 2013     Years since quittin.9     Passive exposure: Past    Smokeless tobacco: Former     Quit date: 2013    Tobacco comments:     used marijuana since [id number], stopped after started dialysis   Substance and Sexual Activity    Alcohol use: No     Alcohol/week: 0.0 standard drinks of alcohol    Drug use: Not Currently     Comment:      Sexual activity: Never     Review of Systems   Constitutional:  Positive for activity change, appetite change, fatigue and unexpected weight change. Negative for fever.   HENT:  Negative for congestion, ear pain, hearing loss, mouth  sores, trouble swallowing and voice change.    Eyes:  Negative for pain, redness and itching.   Respiratory:  Negative for apnea, cough, choking, chest tightness, shortness of breath and wheezing.    Cardiovascular:  Positive for palpitations. Negative for chest pain and leg swelling.   Gastrointestinal:  Positive for abdominal pain. Negative for abdominal distention, anal bleeding, blood in stool, constipation, diarrhea, nausea, rectal pain and vomiting.   Endocrine: Negative for cold intolerance, heat intolerance and polyphagia.   Genitourinary:  Negative for dysuria, hematuria and urgency.   Musculoskeletal:  Positive for arthralgias. Negative for joint swelling and neck pain.   Skin:  Positive for color change. Negative for pallor and rash.   Allergic/Immunologic: Negative for environmental allergies and food allergies.   Neurological:  Positive for weakness. Negative for dizziness, facial asymmetry and light-headedness.   Hematological:  Negative for adenopathy. Does not bruise/bleed easily.   Psychiatric/Behavioral:  Negative for agitation, behavioral problems, confusion and decreased concentration. The patient is nervous/anxious.      Objective:     Vital Signs (Most Recent):  Temp: 98.4 °F (36.9 °C) (05/10/25 0432)  Pulse: 91 (05/10/25 1154)  Resp: 18 (05/10/25 1154)  BP: 128/74 (05/10/25 1154)  SpO2: 97 % (05/10/25 1154) Vital Signs (24h Range):  Temp:  [97.2 °F (36.2 °C)-98.4 °F (36.9 °C)] 98.4 °F (36.9 °C)  Pulse:  [] 91  Resp:  [17-20] 18  SpO2:  [96 %-98 %] 97 %  BP: (111-145)/(58-75) 128/74     Weight: 95.7 kg (210 lb 15.7 oz) (05/09/25 2314)  Body mass index is 28.61 kg/m².      Intake/Output Summary (Last 24 hours) at 5/10/2025 1245  Last data filed at 5/10/2025 0432  Gross per 24 hour   Intake 596.8 ml   Output --   Net 596.8 ml       Lines/Drains/Airways       Peripheral Intravenous Line  Duration                  Hemodialysis AV Fistula Right upper arm -- days         Peripheral IV - Single  Lumen 05/06/25 1312 20 G 1 1/4 in Left Antecubital 3 days         Peripheral IV - Single Lumen 05/07/25 1558 22 G Left;Posterior Hand 2 days                     Physical Exam  Vitals and nursing note reviewed.   Constitutional:       Appearance: He is well-developed. He is ill-appearing.   HENT:      Head: Normocephalic and atraumatic.   Eyes:      Conjunctiva/sclera: Conjunctivae normal.      Pupils: Pupils are equal, round, and reactive to light.   Neck:      Thyroid: No thyromegaly.      Trachea: No tracheal deviation.   Cardiovascular:      Rate and Rhythm: Normal rate and regular rhythm.   Pulmonary:      Effort: Pulmonary effort is normal.      Breath sounds: Normal breath sounds. No wheezing or rales.   Chest:      Chest wall: No tenderness.   Abdominal:      General: Bowel sounds are normal. There is no distension.      Palpations: Abdomen is soft. There is no mass.      Tenderness: There is abdominal tenderness. There is no guarding.   Musculoskeletal:         General: Normal range of motion.      Cervical back: Normal range of motion and neck supple.      Right lower leg: Edema present.      Left lower leg: Edema present.   Lymphadenopathy:      Cervical: No cervical adenopathy.   Skin:     General: Skin is warm and dry.   Neurological:      Mental Status: He is alert and oriented to person, place, and time.      Cranial Nerves: No cranial nerve deficit.      Gait: Gait abnormal.   Psychiatric:         Behavior: Behavior normal.          Significant Labs:  Lab Results   Component Value Date    WBC 2.62 (L) 05/09/2025    HGB 6.8 (L) 05/09/2025    HCT 24.2 (L) 05/09/2025    MCV 85 05/09/2025     05/09/2025     BMP  Lab Results   Component Value Date     05/10/2025    K 4.4 05/10/2025     05/10/2025    CO2 21 (L) 05/10/2025    BUN 78 (H) 05/10/2025    CREATININE 4.6 (H) 05/10/2025    CALCIUM 9.5 05/10/2025    ANIONGAP 13 05/10/2025    EGFRNORACEVR 13 (L) 05/10/2025     Lab Results    Component Value Date    ALT 30 05/08/2025    AST 32 05/08/2025    ALKPHOS 237 (H) 05/08/2025    BILITOT 0.4 05/08/2025     MELD 3.0: 19 at 1/6/2025  3:06 PM  MELD-Na: 21 at 1/6/2025  3:06 PM  Calculated from:  Serum Creatinine: On dialysis. Using the maximum value.  Serum Sodium: 144 mmol/L (Using max of 137 mmol/L) at 1/6/2025  3:06 PM  Total Bilirubin: 0.5 mg/dL (Using min of 1 mg/dL) at 1/6/2025  3:06 PM  Serum Albumin: 3.1 g/dL at 1/6/2025  3:06 PM  INR(ratio): 1.1 at 1/6/2025  3:06 PM  Age at listing (hypothetical): 69 years  Sex: Male at 1/6/2025  3:06 PM    Lab Results   Component Value Date    LIPASE 16 05/06/2025     Significant Imaging:  Imaging results within the past 24 hours have been reviewed.

## 2025-05-10 NOTE — HPI
The patient is a 69-year-old male with a complex medical history including coronary artery disease (CAD), biventricular congestive heart failure (CHF) with an ejection fraction of 35-40%, status post renal transplant in 2015, chronic immunosuppression, CKD stage 3, diabetes mellitus, anemia, hyperlipidemia, hypertension, history of DVT (on Eliquis), gout, chronic venous stasis with recurrent skin infections, and morbid obesity.    He presented to the ED with abdominal pain, accompanied by lower back pain radiating to his abdomen, described as aching, rated 8/10, occurring intermittently over the last 3 days. He denies fever, chills, chest pain, shortness of breath, cough, nausea, vomiting, diarrhea, or constipation and reports normal bowel movement yesterday. Additionally, the patient complains of worsening weakness and pain in his arms and legs, attributing it to gout, with the most severe pain in his hands, left knee, and ankles. His left knee has been swollen for an extended period, and he reports chronic bilateral lower extremity swelling. He is bedbound at home, cared for by his wife and daughters. Notably, his abdominal pain resolved upon arrival to the ED. A few months ago he presented with abdominal pain, anemia, diarrhea and vomiting. At the time the anemia was attributed to Prograf toxicity.     Emergency Department Workup:  Vitals: Afebrile, vital signs stable, oxygenation normal.  Labs: Mild leukopenia (WBC 3.4), anemia (Hgb 7.3), elevated BUN (79), sCr (5.7, baseline 1.9-2.8), low albumin (1.5), elevated BNP (738), slightly elevated troponin (0.067>0.062).  EKG: Sinus tachycardia (rate 108), PACs, nonspecific intraventricular block.  CT Chest/Abd/Pelvis: Small left pleural effusion, nonspecific gallbladder distention without signs of acute cholecystitis, fatty liver, atrophic native kidneys, right renal transplant intact.  Hospital Course:  Consulted nephrologist (Dr. Gonsaelz) ? Initiated IV Lasix drip  and metolazone (PRN 5-10mg/dose).  Subsequent assessment ? Potential over-diuresis ? Diuretics held, initiated normal saline at 100cc/hr for 2L.  Prograf level noted at 10 ? Decreased to 2mg BID, recheck level in AM.  Markedly elevated uric acid ? Started on Uloric.  Physical/Occupational Therapy consult ? Determined patient was at baseline status, no further intervention recommended.

## 2025-05-10 NOTE — PROGRESS NOTES
"[facility] - Tele[facility] ([facility] Medicine  Progress Note    Patient Name: Mitch Whittaker  MRN: [id number]  Patient Class: IP- Inpatient   Admission Date: 5/6/2025  Length of Stay: 4 days  Attending Physician: Lindsey Ferreira MD  Primary Care Provider: Valery Caal MD        Subjective     Principal Problem:CKD (chronic kidney disease) stage 4, GFR 15-29 ml/min        HPI:  The patient is a 70yo male with CAD, Combined CHF EF 35 - 40%, s/p Renal transplant 2015, Chronic immunosuppression, CKD3, DM, Anemia, HLD, HTN, Hx DVT- on Eliquis, Gout, Chronic venous stasis and recurrent skin infections, and Morbid obesity who presented to the ED with abdominal pain. Pt reports lower back pain radiating to his abdomen describes as an aching pain rated 8/10 that has occurred on/off for the last 3 days. Pt denies fever, chills, chest pain, SOB, cough, N/V, Diarrhea, or constipation. He reports a normal BM yesterday. Pt also complains of worsening weakness and pain to arms and legs which he attributs to gout. He reports pain is worse to his hands, left knee, and ankles. He reports his left knee has been swollen "for awhile". He reports chronic swelling to BLE. Pt is bed bound at home and lives with his wife and daughters who are his caretakers.   Pt reports abdominal pain has resolved since arrival to ED     In the ED, Afebrile, VSS, oxygenation normal. Labs revealed mild leukopenia with WBC 3.4, Hgb 7.3, BUN 79, sCr 5.7 (baseline 1.9-2.8), Albumin 1.5, , Trop 0.067>0.062. EKG showed sinus tachycardia -rate 108, PACs, nonspecific intraventricular block   CT chest/abd/pelvis showed Small left pleural effusion, Nonspecific distention of the gallbladder, No gallbladder wall thickening or bile duct dilatation, Fatty liver, Native kidneys are small and atrophic, No hydronephrosis, No ureteral stones, Right renal transplant.    Ed provider discussed care with nephrologist, Dr. Gonsalez who recommended IV Lasix drip plus " metolazone as needed 5-10 mg per dose     Overview/Hospital Course:  Patient is a 69-year-old male with a history of coronary artery disease, biventricular CHF with an EF of 35-40%, status post living related donor kidney transplant 2015 on chronic immunosuppression, NAGELITO on CKD 3, diabetes mellitus, anemia, hypertension, history of DVT on Eliquis, gout, chronic venous stasis, morbid obesity.  Patient presented to the emergency department complaining of back pain radiating to his abdomen 8/10.  He reports it is has been occurring for approximately 3 days prior to admission.  Health who complains of generalized body ache worse pain in his legs and knees.  He states this has been going on for quite some time.  Emergency department labs revealed a white count of 3.4 hemoglobin 7.3 creatinine of 5.7 which is up from about 3.  Albumin is 1.5.  CT scan chest abdomen and pelvis revealed a small left pleural effusion otherwise none specific.    Patient was seen by his nephrologist Dr. Gonsalez who initiated Lasix drip with metolazone.  After review by rounding nephrologist it was felt that he was maybe slightly over diuresed.  Diuretics were held and patient was started on normal saline at 100 cc an hour for 2 L.  Prograf level noted to be 10 and Prograf decreased to 2 mg b.i.d. recheck level in a.m.  Markedly elevated uric acid, patient was initiated on Uloric.  Seen and examined by Physical therapy/Occupational therapy who felt that patient was at his baseline status and declined further intervention.      Echo:    Left Ventricle: The left ventricle is normal in size. Moderately increased ventricular mass. Moderately increased wall thickness. There is moderate concentric hypertrophy. Normal wall motion. Septal motion is consistent with bundle branch block. There is moderately reduced systolic function with a visually estimated ejection fraction of 35 - 40%. Grade I diastolic dysfunction.    Right Ventricle: The right  ventricle is normal in size Wall thickness is normal. Systolic function is normal.    Mitral Valve: Mildly calcified leaflets. There is mild mitral annular calcification.    Tricuspid Valve: There is mild regurgitation.    Aorta: The aortic annulus is mildly dilated measuring 4.0 cm. The aortic root is normal in size. The ascending aorta is normal in size measuring 3.3 cm.  5/9 agreeable to blood transfusion. Denies overt signs or symptoms of bleeding. Renal function stable. Continue fluids. Nephrology managing immunosuppressant medication(s)   5/10 hb/hct pending. Stool occult positive. Gastroenterology consulted. Denies any symptoms     Interval History: See hospital course for today      Review of Systems   Constitutional:  Positive for activity change. Negative for appetite change, fatigue and fever.   Respiratory:  Negative for shortness of breath.    Cardiovascular:  Positive for leg swelling.   Gastrointestinal:  Negative for abdominal pain, nausea and vomiting.   Musculoskeletal:  Positive for arthralgias and gait problem.   Allergic/Immunologic: Positive for immunocompromised state.   Neurological:  Positive for weakness (chronic debility).   Psychiatric/Behavioral:  Negative for agitation, behavioral problems, confusion and decreased concentration. The patient is nervous/anxious.      Objective:     Vital Signs (Most Recent):  Temp: 98.4 °F (36.9 °C) (05/10/25 0432)  Pulse: 91 (05/10/25 0930)  Resp: 18 (05/10/25 0901)  BP: 122/60 (05/10/25 0930)  SpO2: 97 % (05/10/25 0901) Vital Signs (24h Range):  Temp:  [97.2 °F (36.2 °C)-98.4 °F (36.9 °C)] 98.4 °F (36.9 °C)  Pulse:  [] 91  Resp:  [17-20] 18  SpO2:  [96 %-98 %] 97 %  BP: (111-145)/(58-75) 122/60     Weight: 95.7 kg (210 lb 15.7 oz)  Body mass index is 28.61 kg/m².    Intake/Output Summary (Last 24 hours) at 5/10/2025 1037  Last data filed at 5/10/2025 0433  Gross per 24 hour   Intake 596.8 ml   Output --   Net 596.8 ml         Physical Exam  Vitals  and nursing note reviewed. Exam conducted with a chaperone present (nursing).   Constitutional:       General: He is not in acute distress.     Appearance: He is ill-appearing (chronically). He is not toxic-appearing.   HENT:      Head: Normocephalic and atraumatic.   Cardiovascular:      Rate and Rhythm: Normal rate.   Pulmonary:      Effort: Pulmonary effort is normal. No respiratory distress.   Abdominal:      Palpations: Abdomen is soft.      Tenderness: There is no abdominal tenderness.   Genitourinary:     Rectum: Normal anal tone.      Comments: Rectal exam negative for bright red blood or melena, more consistent with discoloration from iron supplementation    Musculoskeletal:      Right lower leg: Edema present.      Left lower leg: Edema present.   Skin:     General: Skin is warm.   Neurological:      Mental Status: He is alert. Mental status is at baseline.      Motor: Weakness present.   Psychiatric:         Mood and Affect: Mood is anxious.               Significant Labs: All pertinent labs within the past 24 hours have been reviewed.  CMP:   Recent Labs   Lab 05/09/25  0704 05/10/25  0612    138   K 4.4 4.4    104   CO2 21* 21*   * 193*   BUN 87* 78*   CREATININE 5.4* 4.6*   CALCIUM 9.2 9.5   ALBUMIN 1.5* 1.6*   ANIONGAP 14 13     Cpk 29    Cbc pending  Occult blood positive     Significant Imaging: I have reviewed all pertinent imaging results/findings within the past 24 hours.      Assessment & Plan  Acute on chronic combined systolic and diastolic congestive heart failure  Patient has Combined Systolic and Diastolic heart failure that is Acute on chronic. On presentation their CHF was decompensated. Evidence of decompensated CHF on presentation includes: edema, elevated JVD, and worsening renal function. The etiology of their decompensation is likely dietary indiscretion and increased fluid intake. Most recent BNP and echo results are listed below.  No results for input(s):  ""BNP" in the last 72 hours.    Latest ECHO  Results for orders placed during the hospital encounter of 03/13/25    Echo Saline Bubble? No    Interpretation Summary    Left Ventricle: The left ventricle is normal in size. Mildly increased ventricular mass. Mildly increased wall thickness. There is mild concentric hypertrophy. Normal wall motion. Septal motion is consistent with bundle branch block. There is moderately reduced systolic function with a visually estimated ejection fraction of 35 - 40%. Grade I diastolic dysfunction.    Right Ventricle: The right ventricle is normal in size. Wall thickness is normal. Systolic function is normal.    Left Atrium: Mildly dilated    Aorta: Aortic annulus is mildly dilated measuring 4.01 cm. Ascending aorta is normal measuring 3.69 cm.    Pulmonary Artery: The estimated pulmonary artery systolic pressure is 24 mmHg.    IVC/SVC: Normal venous pressure at 3 mmHg.    Current Heart Failure Medications  metoprolol succinate (TOPROL-XL) 24 hr tablet 25 mg, Daily, Oral    Plan  - Monitor strict I&Os and daily weights.    - Place on telemetry  - Low sodium diet  - Place on fluid restriction of 1.5 L.   - Cardiology has not been consulted  - The patient's volume status is worsening as indicated by edema and elevated JVD. Will continue current treatment     Resolved       ANGELITO on CKD 4  ANGELITO is likely due to CHF Baseline creatinine is 1.9-2.8. Most recent creatinine and eGFR are listed below.  Recent Labs     05/08/25  0506 05/09/25  0704 05/10/25  0612   CREATININE 5.8* 5.4* 4.6*   EGFRNORACEVR 10* 11* 13*      Plan  - ANGELITO is worsening. Will continue current treatment  - Avoid nephrotoxins and renally dose meds for GFR listed above  - Monitor urine output, serial BMP, and adjust therapy as needed  Urinalysis with trace protein trace blood, urine protein creatinine ratio 0.49  Improving   Obstructive sleep apnea  CPAP hs    Coronary artery disease of native artery of native heart with " stable angina pectoris  Patient with known non obstructive CAD , which is controlled Will continue BB, ASA, and Statin and monitor for S/Sx of angina/ACS. Continue to monitor on telemetry.   Hb 6.8 warranting another blood transfusion  Status post transfusion  Cbc pending  Living-related donor kidney transplant (daughter) - 6/12/15  Nephrology following   Continue Prograf, Prednisone, Cellcept   Prograf level 10 - decreased to 2mg/2mg    Secondary hyperparathyroidism of renal origin  Stable   Cont Calcitriol     Type II diabetes mellitus with renal manifestations  Patient's FSGs are controlled on current medication regimen.  Last A1c reviewed-   Lab Results   Component Value Date    HGBA1C 6.3 (H) 05/07/2025     Most recent fingerstick glucose reviewed-   Recent Labs   Lab 05/09/25  1124 05/09/25  1628 05/09/25  2111 05/10/25  0558   POCTGLUCOSE 313* 308* 279* 215*     Current correctional scale  Low  Maintain anti-hyperglycemic dose as follows-   Antihyperglycemics (From admission, onward)      Start     Stop Route Frequency Ordered    05/06/25 2100  insulin glargine U-100 (Lantus) pen 5 Units         -- SubQ 2 times daily 05/06/25 1644    05/06/25 1813  insulin aspart U-100 pen 0-5 Units         -- SubQ Before meals & nightly PRN 05/06/25 1713          Hold Oral hypoglycemics while patient is in the hospital.    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 The patient's latest AMPAC (Activity Measure for Post Acute Care) Score is listed below.    AM-PAC Score - How much help does the patient need for each activity listed  Basic Mobility Total Score: 6  Turning over in bed (including adjusting bedclothes, sheets and blankets)?: Unable  Sitting down on and standing up from a chair with arms (e.g., wheelchair, bedside commode, etc.): Unable  Moving from lying on back to sitting on the side of the bed?: Unable  Moving to and from a bed to a chair (including a wheelchair)?: Unable  Need to walk in hospital room?: Unable  Climbing 3-5 steps with a railing?: Unable    Plan  - Progressive mobility protocol initated  - Fall precautions in place          Gout  Elevated uric acid, cont Allopurinol   On uloric     History of DVT (deep vein thrombosis)  Stable   On Eliquis   With hb/hct downtrending with concerns for GI bleed   Hold eliquis  Immunosuppression  Nephrology recommended continuing pt's home medications Prograf, Cellcept, and Prednisone     Arthralgia  Uric acid 11.5  Markedly elevated inflammatory markers  Start on Uloric    ABL Anemia plus anemia of CKD 4  Anemia is likely due to chronic blood loss, Iron deficiency, and chronic disease due to ESRD. Most recent hemoglobin and hematocrit are listed below.  Recent Labs     05/08/25  0506 05/09/25  0534   HGB 8.7* 6.8*   HCT 29.4* 24.2*     Plan  - Monitor serial CBC: Daily  - Transfuse PRBC if patient becomes hemodynamically unstable, symptomatic or H/H drops below 7/21.  - Patient has received 2 units of PRBCs on 5/7, 5/9  - Patient's anemia is currently pending results  - gastroenterology consulted   Review of prior workup in past includes previous egd in 2024, negative for hpylori, negative for celiac and negative malignancy  Review of prior colonoscopies include 2018 with polypectomy, 2022 with polypectomy with adenoma with focal cryptitis, and 2023 with no inflammation, no malignancy on polyp biopsy    VTE  Risk Mitigation (From admission, onward)           Ordered     apixaban tablet 5 mg  2 times daily         05/10/25 1044     IP VTE HIGH RISK PATIENT  Once         05/06/25 1644     Place sequential compression device  Until discontinued         05/06/25 1644                    Discharge Planning   GRETEL: 5/12/2025     Code Status: Full Code   Medical Readiness for Discharge Date:   Discharge Plan A: Home Health, Home with family                    Lindsey Ferreira MD  Department of Hospital Medicine   [facility] - Telemetry ([facility])

## 2025-05-10 NOTE — PLAN OF CARE
[id number]/A237 SB Whittaker is a 69 y.o.male admitted on 5/6/2025 for CKD (chronic kidney disease) stage 4, GFR 15-29 ml/min   Code Status: Full Code MRN: [id number]   Review of patient's allergies indicates:   Allergen Reactions    Lisinopril Other (See Comments)     Other reaction(s):  cough    Actos  [pioglitazone] Other (See Comments)     Other reaction(s): CHF    Metformin Other (See Comments)     Other reaction(s): renal insuff  Other reaction(s): CHF     Past Medical History:   Diagnosis Date    Acquired renal cyst of left kidney     Anemia associated with chronic renal failure     CAD (coronary artery disease)     nonobstructive lhc 9/14    CHF (congestive heart failure)     Chronic immunosuppression with Prograf and MMF 06/18/2015    Chronic venous insufficiency of lower extremity     CKD (chronic kidney disease) stage 3, GFR 30-59 ml/min     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 RNA NEGATIVE 5/2015, 2021, 2022    History of colon polyps     HPTH (hyperparathyroidism)     Hyperlipidemia     Hypertension associated with stage 3 chronic kidney disease due to type 2 diabetes mellitus     LBBB (left bundle branch block) 12/20/2021    Morbid obesity with BMI of 45.0-49.9, adult     Nephrolithiasis 6/7/2013    PCO (posterior capsular opacification), left 03/04/2019    Proteinuria     resolved s/p transplant    S/P kidney transplant     Sleep apnea     Type 2 diabetes, uncontrolled, with retinopathy     Type II diabetes mellitus with renal manifestations       PRN meds    0.9%  NaCl infusion (for blood administration), , Q24H PRN  0.9%  NaCl infusion (for blood  administration), , Q24H PRN  acetaminophen, 650 mg, Q8H PRN  dextrose 50%, 12.5 g, PRN  dextrose 50%, 25 g, PRN  glucagon (human recombinant), 1 mg, PRN  glucose, 16 g, PRN  glucose, 24 g, PRN  insulin aspart U-100, 0-5 Units, QID (AC + HS) PRN  ondansetron, 4 mg, Q12H PRN  sodium chloride 0.9%, 10 mL, PRN      Chart check completed. Will continue plan of care.      Orientation: oriented x 4  Tung Coma Scale Score: 15     Lead Monitored: Lead II Rhythm: normal sinus rhythm    Cardiac/Telemetry Box Number: [id number]  VTE Core Measure: Pharmacological prophylaxis initiated/maintained Last Bowel Movement: 05/09/25  Diet Consistent Carbohydrate 2000 Calories (up to 75 gm per meal); Heart Healthy; Fluid - 1500mL  Voiding Characteristics: incontinence  Dewayne Score: 15  Fall Risk Score: 11  Accucheck []   Freq?      Lines/Drains/Airways       Peripheral Intravenous Line  Duration                  Hemodialysis AV Fistula Right upper arm -- days         Peripheral IV - Single Lumen 05/06/25 1312 20 G 1 1/4 in Left Antecubital 3 days         Peripheral IV - Single Lumen 05/07/25 1558 22 G Left;Posterior Hand 2 days

## 2025-05-10 NOTE — NURSING
Nurse and PCA made several attempts to turn patient and patient is refusing to be turned or repositioned in bed.

## 2025-05-10 NOTE — ASSESSMENT & PLAN NOTE
Patient with known non obstructive CAD , which is controlled Will continue BB, ASA, and Statin and monitor for S/Sx of angina/ACS. Continue to monitor on telemetry.   Hb 6.8 warranting another blood transfusion  Status post transfusion  Cbc pending

## 2025-05-10 NOTE — ASSESSMENT & PLAN NOTE
Anemia is likely due to chronic blood loss, Iron deficiency, and chronic disease due to ESRD. Most recent hemoglobin and hematocrit are listed below.  Recent Labs     05/08/25  0506 05/09/25  0534   HGB 8.7* 6.8*   HCT 29.4* 24.2*     Plan  - Monitor serial CBC: Daily  - Transfuse PRBC if patient becomes hemodynamically unstable, symptomatic or H/H drops below 7/21.  - Patient has received 2 units of PRBCs on 5/7, 5/9  - Patient's anemia is currently pending results  - gastroenterology consulted   Review of prior workup in past includes previous egd in 2024, negative for hpylori, negative for celiac and negative malignancy  Review of prior colonoscopies include 2018 with polypectomy, 2022 with polypectomy with adenoma with focal cryptitis, and 2023 with no inflammation, no malignancy on polyp biopsy

## 2025-05-10 NOTE — ASSESSMENT & PLAN NOTE
ANGELITO is likely due to CHF Baseline creatinine is 1.9-2.8. Most recent creatinine and eGFR are listed below.  Recent Labs     05/08/25  0506 05/09/25  0704 05/10/25  0612   CREATININE 5.8* 5.4* 4.6*   EGFRNORACEVR 10* 11* 13*      Plan  - ANGELITO is worsening. Will continue current treatment  - Avoid nephrotoxins and renally dose meds for GFR listed above  - Monitor urine output, serial BMP, and adjust therapy as needed  Urinalysis with trace protein trace blood, urine protein creatinine ratio 0.49  Improving

## 2025-05-10 NOTE — PROGRESS NOTES
Nephrology Progress Note     History of Present Illness     69-year-old gentleman with history of ESRD.  He underwent living related donor transplantation in 2015 at [facility].  He has known chronic allograft nephropathy with baseline creatinine running 2-3.  Labile nature of creatinine in part due to his known combined systolic and diastolic heart failure with LVEF of 40%.  Outpatient immunosuppression consists of tacrolimus 2 mg twice daily with 100 mg of ketoconazole for augmentation daily, mycophenolate 500 mg twice daily, and prednisone 5 mg daily.  Followed by Dr. Gonsalez locally.  Seen in telemedicine by Dr. Gonsalez around May 1st.  Was complaining about some dyspnea.  Subsequently admitted May 6 with progressing dyspnea.  Found to have heart failure exacerbation exacerbated by his renal dysfunction.  He also had some abdominal discomfort which has improved.  He had multiple other complaints including generalized weakness and extremity pain which she attributed to gout.  Creatinine on admission was above baseline at 5.7.  Seen by his usual nephrologist who initiated furosemide drip.  He has been diuresed.  Furosemide drip has since been stopped.  Creatinine unchanged for the 1st several days of hospital stay but now slowly improving.  His allograft nephropathy has been characterized by proteinuria.  UPCR field about 1.5 g of proteinuria.  Etiology of ANGELITO somewhat unclear.  Patient on statin outpatient.  CPK checked and that was unremarkable.  Has had very significant anemia during this hospital stay.  Has required multiple units of packed red blood cells.  Platelet count has decreased significantly that remains very normal.  Consider possibility of TMA.  LDH is modestly elevated at 370 (110-260).  Note that tacrolimus can be associated with TMA.    Tacrolimus level he done here initially modestly elevated at 10.  Reasonable goal would be in the range of 6-8.  Patient was on ketoconazole outpatient to augment  levels.  Not getting it here.  We will repeat level on 5/12.     The patient is very debilitated.  He has essentially been bed-bound for 3 months.  He has denied any knowledge of blood in his stool but is wearing diapers given his inability to ambulate to the bathroom.    Interval History     Overnight/currently:  Afebrile.  Vital signs stable.     Health Status   Allergies:    is allergic to lisinopril, actos  [pioglitazone], and metformin.    Current medications:   Current Medications[1]     Physical Examination   VS/Measurements   /64 (BP Location: Left arm, Patient Position: Lying)   Pulse 90   Temp 98.4 °F (36.9 °C) (Oral)   Resp 18   Ht 6' (1.829 m)   Wt 95.7 kg (210 lb 15.7 oz)   SpO2 96%   BMI 28.61 kg/m²      General:  Alert and oriented X3, No acute distress.    Neck:  Supple, No lymphadenopathy.     Respiratory:  Lungs are clear to auscultation, Respirations are non-labored, Symmetrical chest wall expansion.    Cardiovascular:  Normal rate, Regular rhythm.  Modest lower extremity edema.  Gastrointestinal:  Soft, Non-tender, Normal bowel sounds.   Integumentary:  Warm, Dry.  Has been edges on ankles where he has wounds.  Psychiatric:  Cooperative, Appropriate mood & affect.        Review / Management   Laboratory Results   Today's Lab Results :    Recent Results (from the past 24 hours)   POCT glucose    Collection Time: 05/09/25 11:24 AM   Result Value Ref Range    POCT Glucose 313 (H) 70 - 110 mg/dL   POCT glucose    Collection Time: 05/09/25  4:28 PM   Result Value Ref Range    POCT Glucose 308 (H) 70 - 110 mg/dL   POCT glucose    Collection Time: 05/09/25  9:11 PM   Result Value Ref Range    POCT Glucose 279 (H) 70 - 110 mg/dL   Occult blood x 1, stool    Collection Time: 05/09/25  9:16 PM    Specimen: Stool   Result Value Ref Range    OCCULT BLOOD STOOL Positive (A) Negative   POCT glucose    Collection Time: 05/10/25  5:58 AM   Result Value Ref Range    POCT Glucose 215 (H) 70 - 110  mg/dL   Magnesium    Collection Time: 05/10/25  6:12 AM   Result Value Ref Range    Magnesium  2.1 1.6 - 2.6 mg/dL   Renal Function Panel    Collection Time: 05/10/25  6:12 AM   Result Value Ref Range    Sodium 138 136 - 145 mmol/L    Potassium 4.4 3.5 - 5.1 mmol/L    Chloride 104 95 - 110 mmol/L    CO2 21 (L) 23 - 29 mmol/L    Glucose 193 (H) 70 - 110 mg/dL    BUN 78 (H) 8 - 23 mg/dL    Creatinine 4.6 (H) 0.5 - 1.4 mg/dL    Calcium 9.5 8.7 - 10.5 mg/dL    Albumin 1.6 (L) 3.5 - 5.2 g/dL    Phosphorus Level 4.1 2.7 - 4.5 mg/dL    Anion Gap 13 8 - 16 mmol/L    eGFR 13 (L) >60 mL/min/1.73/m2   Lactate dehydrogenase    Collection Time: 05/10/25  6:12 AM   Result Value Ref Range    Lactate Dehydrogenase 370 (H) 110 - 260 U/L        Impression and Plan   Diagnosis     Acute on chronic heart failure exacerbation in setting of combined systolic and diastolic heart failure.  No dyspnea currently.    Kidney transplant status.  On usual immunosuppressive but now receiving aforementioned ketoconazole.  Hold off on that given elevated level of tacrolimus on admission.  Follow tacrolimus level on May 12th.    ANGELITO superimposed upon CKD.  Unclear etiology but possibly simply decompensated heart failure.  Creatinine now improving.  Supportive care.    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  Baseline creatinine labile in setting of heart failure.    Hypertension and CKD. Adequately controlled.      Multifactorial anemia.  Has baseline anemia in setting of CKD.  On QUINTEN and receiving IV iron.  However, here has required multiple units of packed red blood cells.  Stool occult blood positive.  May need GI evaluation.  Discussed with hospital medicine.  Does have elevated LDH.  Unclear if this is due to hemolysis.  Add haptoglobin to labs.    CKD-MBD/secondary hyperparathyroidism of renal origin.  Hypophosphatemic on admission but that has improved.  Receiving sevelamer.  Not usually used in CKD and we will discontinue.  Unclear if this will  interact with transplant medications.  Corrected calcium significantly elevated at around 11.5.  Discontinue calcitriol.  Immobile.    History of deep venous thrombosis.      Gout.  On febuxostat.    Type 2 diabetes.  Well-controlled.  Per Hospital Medicine.      History of coronary artery disease.     Lower extremity edema.  Part of this is simply 3rd spacing in setting of severe hypoalbuminemia.    Severe hypoalbuminemia.      Sleep apnea.    Failure to thrive.  Patient/family would benefit from discussion regarding realistic goals of care.  From my perspective DNR status is likely appropriate.    ______________________________________________   Grace Schaefer MD    This document was created using voice recognition software.  It is possible that there are errors which have persisted after original proofreading.  If there is a question regarding contents of this document please contact me for clarification.         [1]   Current Facility-Administered Medications:     0.9%  NaCl infusion (for blood administration), , Intravenous, Q24H PRN, Ann Lazo MD, New Bag at 05/09/25 0805    0.9%  NaCl infusion (for blood administration), , Intravenous, Q24H PRN, Lindsey Ferreira MD    acetaminophen tablet 650 mg, 650 mg, Oral, Q8H PRN, Emma Balbuena, MAGALIE, 650 mg at 05/08/25 0847    apixaban tablet 5 mg, 5 mg, Oral, BID, Emma Balbuena, NP, 5 mg at 05/09/25 2113    aspirin EC tablet 81 mg, 81 mg, Oral, Daily, Emma Balbuena, MAGALIE, 81 mg at 05/09/25 0912    calcitRIOL capsule 0.25 mcg, 0.25 mcg, Oral, Daily, Emma Balbuena, NP, 0.25 mcg at 05/09/25 0910    dextrose 50% injection 12.5 g, 12.5 g, Intravenous, PRN, Emma Balbuena, NP    dextrose 50% injection 25 g, 25 g, Intravenous, PRN, Emma Balbuena, NP    epoetin jeronimo injection 5,000 Units, 5,000 Units, Subcutaneous, Q7 Days, Hany Gonsalez MD, 5,000 Units at 05/07/25 1602    famotidine tablet 20 mg, 20 mg, Oral, Every other day, Emma Balbuena, NP, 20  mg at 05/09/25 0913    febuxostat tablet 40 mg, 40 mg, Oral, Daily, Ann Lazo MD, 40 mg at 05/09/25 1028    ferric gluconate (Ferrlecit) 62.5 mg/5 mL injection 125 mg, 125 mg, Intravenous, Daily, Hany Gonsalez MD, 125 mg at 05/09/25 0916    glucagon (human recombinant) injection 1 mg, 1 mg, Intramuscular, PRN, Emma Balbuena, NP    glucose chewable tablet 16 g, 16 g, Oral, PRN, Emma Balbuena NP    glucose chewable tablet 24 g, 24 g, Oral, PRN, Emma Balbuena, NP    insulin aspart U-100 pen 0-5 Units, 0-5 Units, Subcutaneous, QID (AC + HS) PRN, Emma Balbuena NP, 2 Units at 05/10/25 0559    insulin glargine U-100 (Lantus) pen 5 Units, 5 Units, Subcutaneous, BID, Emma Balbuena NP, 5 Units at 05/09/25 2112    metoprolol succinate (TOPROL-XL) 24 hr tablet 25 mg, 25 mg, Oral, Daily, Emma Balbuena NP, 25 mg at 05/09/25 0912    mycophenolate capsule 500 mg, 500 mg, Oral, BID, Hany Gonsalez MD, 500 mg at 05/09/25 2113    ondansetron injection 4 mg, 4 mg, Intravenous, Q12H PRN, Emma Balbuena NP    predniSONE tablet 5 mg, 5 mg, Oral, Daily, Hany Gonsalez MD, 5 mg at 05/09/25 0909    sevelamer carbonate tablet 800 mg, 800 mg, Oral, TID WM, Bucky Riojas NP, 800 mg at 05/09/25 1712    sodium chloride 0.9% flush 10 mL, 10 mL, Intravenous, PRN, Emma Balbuena NP    tacrolimus capsule 2 mg, 2 mg, Oral, BID, Ann Lazo MD, 2 mg at 05/09/25 1814

## 2025-05-10 NOTE — CONSULTS
[facility])  Gastroenterology  Consult Note    Patient Name: Mitch Whittaker  MRN: [id number]  Admission Date: 5/6/2025  Hospital Length of Stay: 4 days  Code Status: Full Code   Attending Provider: Lindsey Ferreira MD   Consulting Provider: Jerel Lilly MD  Primary Care Physician: Valery Caal MD  Principal Problem:CKD (chronic kidney disease) stage 4, GFR 15-29 ml/min    Inpatient consult to Gastroenterology  Consult performed by: Jerel Lilly MD  Consult ordered by: Lindsey Ferreira MD  Reason for consult: Anemia        Subjective:     HPI:  The patient is a 69-year-old male with a complex medical history including coronary artery disease (CAD), biventricular congestive heart failure (CHF) with an ejection fraction of 35-40%, status post renal transplant in 2015, chronic immunosuppression, CKD stage 3, diabetes mellitus, anemia, hyperlipidemia, hypertension, history of DVT (on Eliquis), gout, chronic venous stasis with recurrent skin infections, and morbid obesity.    He presented to the ED with abdominal pain, accompanied by lower back pain radiating to his abdomen, described as aching, rated 8/10, occurring intermittently over the last 3 days. He denies fever, chills, chest pain, shortness of breath, cough, nausea, vomiting, diarrhea, or constipation and reports normal bowel movement yesterday. Additionally, the patient complains of worsening weakness and pain in his arms and legs, attributing it to gout, with the most severe pain in his hands, left knee, and ankles. His left knee has been swollen for an extended period, and he reports chronic bilateral lower extremity swelling. He is bedbound at home, cared for by his wife and daughters. Notably, his abdominal pain resolved upon arrival to the ED. A few months ago he presented with abdominal pain, anemia, diarrhea and vomiting. At the time the anemia was attributed to Prograf toxicity.     Emergency Department Workup:  Vitals: Afebrile,  vital signs stable, oxygenation normal.  Labs: Mild leukopenia (WBC 3.4), anemia (Hgb 7.3), elevated BUN (79), sCr (5.7, baseline 1.9-2.8), low albumin (1.5), elevated BNP (738), slightly elevated troponin (0.067>0.062).  EKG: Sinus tachycardia (rate 108), PACs, nonspecific intraventricular block.  CT Chest/Abd/Pelvis: Small left pleural effusion, nonspecific gallbladder distention without signs of acute cholecystitis, fatty liver, atrophic native kidneys, right renal transplant intact.  Hospital Course:  Consulted nephrologist (Dr. Gonsalez) ? Initiated IV Lasix drip and metolazone (PRN 5-10mg/dose).  Subsequent assessment ? Potential over-diuresis ? Diuretics held, initiated normal saline at 100cc/hr for 2L.  Prograf level noted at 10 ? Decreased to 2mg BID, recheck level in AM.  Markedly elevated uric acid ? Started on Uloric.  Physical/Occupational Therapy consult ? Determined patient was at baseline status, no further intervention recommended.    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 RNA NEGATIVE 5/2015, 2021, 2022    History of colon polyps     HPTH (hyperparathyroidism)     Hyperlipidemia     Hypertension associated with stage 3 chronic kidney disease due to type 2 diabetes  mellitus     LBBB (left bundle branch block) 12/20/2021    Morbid obesity with BMI of 45.0-49.9, adult     Nephrolithiasis 6/7/2013    PCO (posterior capsular opacification), left 03/04/2019    Proteinuria     resolved s/p transplant    S/P kidney transplant     Sleep apnea     Type 2 diabetes, uncontrolled, with retinopathy     Type II diabetes mellitus with renal manifestations        Past Surgical History:   Procedure Laterality Date    CARDIAC CATHETERIZATION  01/01/2008    normal coronary    CARPAL TUNNEL RELEASE Right 12/01/2023    Procedure: RELEASE, CARPAL TUNNEL;  Surgeon: Noel Almonte MD;  Location: [facility] OR;  Service: Orthopedics;  Laterality: Right;    CARPAL TUNNEL RELEASE Left 7/18/2024    Procedure: RELEASE, CARPAL TUNNEL;  Surgeon: Noel Almonte MD;  Location: [facility] OR;  Service: Orthopedics;  Laterality: Left;    COLONOSCOPY N/A 04/05/2018    Procedure: COLONOSCOPY;  Surgeon: Chava Ronquillo MD;  Location: [facility] ENDO;  Service: Endoscopy;  Laterality: N/A;    COLONOSCOPY N/A 05/02/2022    Procedure: COLONOSCOPY;  Surgeon: Alix Puente MD;  Location: [facility] ENDO;  Service: Endoscopy;  Laterality: N/A;    COLONOSCOPY N/A 06/07/2023    Procedure: COLONOSCOPY - rule out CMV  Cardiac clearance/Eliquis hold approval received on 05/21/23 per Dr. Meade, cardiology.  Note in encounters.  LB;  Surgeon: Daniella Shah MD;  Location: [facility];  Service: Endoscopy;  Laterality: N/A;    ESOPHAGOGASTRODUODENOSCOPY N/A 03/26/2024    Procedure: EGD (ESOPHAGOGASTRODUODENOSCOPY) 3/1-pt cleared, ok to hold eliquis for 3 days;  Surgeon: Daniella Shah MD;  Location: [facility] ENDO;  Service: Endoscopy;  Laterality: N/A;    KIDNEY TRANSPLANT  2015    RETINAL LASER PROCEDURE         Review of patient's allergies indicates:   Allergen Reactions    Lisinopril Other (See Comments)     Other reaction(s):  cough    Actos  [pioglitazone] Other (See Comments)     Other reaction(s): CHF    Metformin  Other (See Comments)     Other reaction(s): renal insuff  Other reaction(s): CHF     Family History       Problem Relation (Age of Onset)    Diabetes Mother, Sister, Maternal Grandmother    Heart failure Mother, Father    Hypertension Mother    Kidney disease Sister          Tobacco Use    Smoking status: Former     Current packs/day: 0.00     Types: Cigarettes     Quit date: 2013     Years since quittin.9     Passive exposure: Past    Smokeless tobacco: Former     Quit date: 2013    Tobacco comments:     used marijuana since [id number], stopped after started dialysis   Substance and Sexual Activity    Alcohol use: No     Alcohol/week: 0.0 standard drinks of alcohol    Drug use: Not Currently     Comment:      Sexual activity: Never     Review of Systems   Constitutional:  Positive for activity change, appetite change, fatigue and unexpected weight change. Negative for fever.   HENT:  Negative for congestion, ear pain, hearing loss, mouth sores, trouble swallowing and voice change.    Eyes:  Negative for pain, redness and itching.   Respiratory:  Negative for apnea, cough, choking, chest tightness, shortness of breath and wheezing.    Cardiovascular:  Positive for palpitations. Negative for chest pain and leg swelling.   Gastrointestinal:  Positive for abdominal pain. Negative for abdominal distention, anal bleeding, blood in stool, constipation, diarrhea, nausea, rectal pain and vomiting.   Endocrine: Negative for cold intolerance, heat intolerance and polyphagia.   Genitourinary:  Negative for dysuria, hematuria and urgency.   Musculoskeletal:  Positive for arthralgias. Negative for joint swelling and neck pain.   Skin:  Positive for color change. Negative for pallor and rash.   Allergic/Immunologic: Negative for environmental allergies and food allergies.   Neurological:  Positive for weakness. Negative for dizziness, facial asymmetry and light-headedness.   Hematological:  Negative for adenopathy.  Does not bruise/bleed easily.   Psychiatric/Behavioral:  Negative for agitation, behavioral problems, confusion and decreased concentration. The patient is nervous/anxious.      Objective:     Vital Signs (Most Recent):  Temp: 98.4 °F (36.9 °C) (05/10/25 0432)  Pulse: 91 (05/10/25 1154)  Resp: 18 (05/10/25 1154)  BP: 128/74 (05/10/25 1154)  SpO2: 97 % (05/10/25 1154) Vital Signs (24h Range):  Temp:  [97.2 °F (36.2 °C)-98.4 °F (36.9 °C)] 98.4 °F (36.9 °C)  Pulse:  [] 91  Resp:  [17-20] 18  SpO2:  [96 %-98 %] 97 %  BP: (111-145)/(58-75) 128/74     Weight: 95.7 kg (210 lb 15.7 oz) (05/09/25 2314)  Body mass index is 28.61 kg/m².      Intake/Output Summary (Last 24 hours) at 5/10/2025 1245  Last data filed at 5/10/2025 0433  Gross per 24 hour   Intake 596.8 ml   Output --   Net 596.8 ml       Lines/Drains/Airways       Peripheral Intravenous Line  Duration                  Hemodialysis AV Fistula Right upper arm -- days         Peripheral IV - Single Lumen 05/06/25 1312 20 G 1 1/4 in Left Antecubital 3 days         Peripheral IV - Single Lumen 05/07/25 1558 22 G Left;Posterior Hand 2 days                     Physical Exam  Vitals and nursing note reviewed.   Constitutional:       Appearance: He is well-developed. He is ill-appearing.   HENT:      Head: Normocephalic and atraumatic.   Eyes:      Conjunctiva/sclera: Conjunctivae normal.      Pupils: Pupils are equal, round, and reactive to light.   Neck:      Thyroid: No thyromegaly.      Trachea: No tracheal deviation.   Cardiovascular:      Rate and Rhythm: Normal rate and regular rhythm.   Pulmonary:      Effort: Pulmonary effort is normal.      Breath sounds: Normal breath sounds. No wheezing or rales.   Chest:      Chest wall: No tenderness.   Abdominal:      General: Bowel sounds are normal. There is no distension.      Palpations: Abdomen is soft. There is no mass.      Tenderness: There is abdominal tenderness. There is no guarding.   Musculoskeletal:          General: Normal range of motion.      Cervical back: Normal range of motion and neck supple.      Right lower leg: Edema present.      Left lower leg: Edema present.   Lymphadenopathy:      Cervical: No cervical adenopathy.   Skin:     General: Skin is warm and dry.   Neurological:      Mental Status: He is alert and oriented to person, place, and time.      Cranial Nerves: No cranial nerve deficit.      Gait: Gait abnormal.   Psychiatric:         Behavior: Behavior normal.          Significant Labs:  Lab Results   Component Value Date    WBC 2.62 (L) 05/09/2025    HGB 6.8 (L) 05/09/2025    HCT 24.2 (L) 05/09/2025    MCV 85 05/09/2025     05/09/2025     BMP  Lab Results   Component Value Date     05/10/2025    K 4.4 05/10/2025     05/10/2025    CO2 21 (L) 05/10/2025    BUN 78 (H) 05/10/2025    CREATININE 4.6 (H) 05/10/2025    CALCIUM 9.5 05/10/2025    ANIONGAP 13 05/10/2025    EGFRNORACEVR 13 (L) 05/10/2025     Lab Results   Component Value Date    ALT 30 05/08/2025    AST 32 05/08/2025    ALKPHOS 237 (H) 05/08/2025    BILITOT 0.4 05/08/2025     MELD 3.0: 19 at 1/6/2025  3:06 PM  MELD-Na: 21 at 1/6/2025  3:06 PM  Calculated from:  Serum Creatinine: On dialysis. Using the maximum value.  Serum Sodium: 144 mmol/L (Using max of 137 mmol/L) at 1/6/2025  3:06 PM  Total Bilirubin: 0.5 mg/dL (Using min of 1 mg/dL) at 1/6/2025  3:06 PM  Serum Albumin: 3.1 g/dL at 1/6/2025  3:06 PM  INR(ratio): 1.1 at 1/6/2025  3:06 PM  Age at listing (hypothetical): 69 years  Sex: Male at 1/6/2025  3:06 PM    Lab Results   Component Value Date    LIPASE 16 05/06/2025     Significant Imaging:  Imaging results within the past 24 hours have been reviewed.  Assessment/Plan:     Immunology/Multi System  Immunosuppression  Could be contributing to anemia. See my discussion above.     Oncology  Iron deficiency anemia due to chronic blood loss  Differential Diagnosis:  Prograf toxicity contributing to anemia via hematuria and  leukopenia.  Chronic kidney disease (CKD)-related anemia due to impaired erythropoiesis.  Gastrointestinal bleeding (occult or upper GI source)--considering history of black stools. Will proceed with EGD/Colon in the morning.   Drug-induced anemia--Eliquis, though recently discontinued, may have contributed.  Iron-deficiency anemia--prior EGD in March 2024 was normal.  Malignancy-related anemia--less likely but should be ruled out.  CMV reactivation--unlikely given previously undetectable levels.    Plan & Recommendations:  Adjust immunosuppression: Lower Prograf dose given elevated levels (33).  Manage hyperkalemia: Follow nephrology recommendations.  Evaluate anemia: Serial Hgb monitoring, iron studies, reticulocyte count.   GI workup: Will perform upper endoscopy and colonoscopy due to recurring and persistent worsening anemia.  Supportive care: Maintain hydration, assess renal function, monitor stool output.  Hematology consultation: If anemia worsens or alternative causes suspected.    GI  History of colon polyps  Colonoscopy tomorrow. See above.         Thank you for your consult. I will follow-up with patient. Please contact us if you have any additional questions.    Jerel Lilly MD  Gastroenterology  [facility] - Telemetry ([facility])

## 2025-05-10 NOTE — ASSESSMENT & PLAN NOTE
Patient's FSGs are controlled on current medication regimen.  Last A1c reviewed-   Lab Results   Component Value Date    HGBA1C 6.3 (H) 05/07/2025     Most recent fingerstick glucose reviewed-   Recent Labs   Lab 05/09/25  1124 05/09/25  1628 05/09/25  2111 05/10/25  0558   POCTGLUCOSE 313* 308* 279* 215*     Current correctional scale  Low  Maintain anti-hyperglycemic dose as follows-   Antihyperglycemics (From admission, onward)      Start     Stop Route Frequency Ordered    05/06/25 2100  insulin glargine U-100 (Lantus) pen 5 Units         -- SubQ 2 times daily 05/06/25 1644    05/06/25 1813  insulin aspart U-100 pen 0-5 Units         -- SubQ Before meals & nightly PRN 05/06/25 1713          Hold Oral hypoglycemics while patient is in the hospital.    Cont Lantus- titrate   Hyperglycemia, on prednisone  Adjust insulin as needed

## 2025-05-10 NOTE — SUBJECTIVE & OBJECTIVE
Interval History: See hospital course for today      Review of Systems   Constitutional:  Positive for activity change. Negative for appetite change, fatigue and fever.   Respiratory:  Negative for shortness of breath.    Cardiovascular:  Positive for leg swelling.   Gastrointestinal:  Negative for abdominal pain, nausea and vomiting.   Musculoskeletal:  Positive for arthralgias and gait problem.   Allergic/Immunologic: Positive for immunocompromised state.   Neurological:  Positive for weakness (chronic debility).   Psychiatric/Behavioral:  Negative for agitation, behavioral problems, confusion and decreased concentration. The patient is nervous/anxious.      Objective:     Vital Signs (Most Recent):  Temp: 98.4 °F (36.9 °C) (05/10/25 0432)  Pulse: 91 (05/10/25 0930)  Resp: 18 (05/10/25 0901)  BP: 122/60 (05/10/25 0930)  SpO2: 97 % (05/10/25 0901) Vital Signs (24h Range):  Temp:  [97.2 °F (36.2 °C)-98.4 °F (36.9 °C)] 98.4 °F (36.9 °C)  Pulse:  [] 91  Resp:  [17-20] 18  SpO2:  [96 %-98 %] 97 %  BP: (111-145)/(58-75) 122/60     Weight: 95.7 kg (210 lb 15.7 oz)  Body mass index is 28.61 kg/m².    Intake/Output Summary (Last 24 hours) at 5/10/2025 1037  Last data filed at 5/10/2025 0433  Gross per 24 hour   Intake 596.8 ml   Output --   Net 596.8 ml         Physical Exam  Vitals and nursing note reviewed. Exam conducted with a chaperone present (nursing).   Constitutional:       General: He is not in acute distress.     Appearance: He is ill-appearing (chronically). He is not toxic-appearing.   HENT:      Head: Normocephalic and atraumatic.   Cardiovascular:      Rate and Rhythm: Normal rate.   Pulmonary:      Effort: Pulmonary effort is normal. No respiratory distress.   Abdominal:      Palpations: Abdomen is soft.      Tenderness: There is no abdominal tenderness.   Genitourinary:     Rectum: Normal anal tone.      Comments: Rectal exam negative for bright red blood or melena, more consistent with discoloration  from iron supplementation    Musculoskeletal:      Right lower leg: Edema present.      Left lower leg: Edema present.   Skin:     General: Skin is warm.   Neurological:      Mental Status: He is alert. Mental status is at baseline.      Motor: Weakness present.   Psychiatric:         Mood and Affect: Mood is anxious.               Significant Labs: All pertinent labs within the past 24 hours have been reviewed.  CMP:   Recent Labs   Lab 05/09/25  0704 05/10/25  0612    138   K 4.4 4.4    104   CO2 21* 21*   * 193*   BUN 87* 78*   CREATININE 5.4* 4.6*   CALCIUM 9.2 9.5   ALBUMIN 1.5* 1.6*   ANIONGAP 14 13     Cpk 29    Cbc pending  Occult blood positive     Significant Imaging: I have reviewed all pertinent imaging results/findings within the past 24 hours.

## 2025-05-10 NOTE — ASSESSMENT & PLAN NOTE
"Patient has Combined Systolic and Diastolic heart failure that is Acute on chronic. On presentation their CHF was decompensated. Evidence of decompensated CHF on presentation includes: edema, elevated JVD, and worsening renal function. The etiology of their decompensation is likely dietary indiscretion and increased fluid intake. Most recent BNP and echo results are listed below.  No results for input(s): "BNP" in the last 72 hours.    Latest ECHO  Results for orders placed during the hospital encounter of 03/13/25    Echo Saline Bubble? No    Interpretation Summary    Left Ventricle: The left ventricle is normal in size. Mildly increased ventricular mass. Mildly increased wall thickness. There is mild concentric hypertrophy. Normal wall motion. Septal motion is consistent with bundle branch block. There is moderately reduced systolic function with a visually estimated ejection fraction of 35 - 40%. Grade I diastolic dysfunction.    Right Ventricle: The right ventricle is normal in size. Wall thickness is normal. Systolic function is normal.    Left Atrium: Mildly dilated    Aorta: Aortic annulus is mildly dilated measuring 4.01 cm. Ascending aorta is normal measuring 3.69 cm.    Pulmonary Artery: The estimated pulmonary artery systolic pressure is 24 mmHg.    IVC/SVC: Normal venous pressure at 3 mmHg.    Current Heart Failure Medications  metoprolol succinate (TOPROL-XL) 24 hr tablet 25 mg, Daily, Oral    Plan  - Monitor strict I&Os and daily weights.    - Place on telemetry  - Low sodium diet  - Place on fluid restriction of 1.5 L.   - Cardiology has not been consulted  - The patient's volume status is worsening as indicated by edema and elevated JVD. Will continue current treatment     Resolved       "

## 2025-05-11 ENCOUNTER — ANESTHESIA (OUTPATIENT)
Dept: ENDOSCOPY | Facility: HOSPITAL | Age: 70
DRG: 291 | End: 2025-05-11
Payer: COMMERCIAL

## 2025-05-11 ENCOUNTER — ANESTHESIA EVENT (OUTPATIENT)
Dept: ENDOSCOPY | Facility: HOSPITAL | Age: 70
DRG: 291 | End: 2025-05-11
Payer: COMMERCIAL

## 2025-05-11 LAB
ALBUMIN SERPL BCP-MCNC: 1.6 G/DL (ref 3.5–5.2)
ANION GAP (OHS): 14 MMOL/L (ref 8–16)
BUN SERPL-MCNC: 72 MG/DL (ref 8–23)
CALCIUM SERPL-MCNC: 9.6 MG/DL (ref 8.7–10.5)
CHLORIDE SERPL-SCNC: 105 MMOL/L (ref 95–110)
CO2 SERPL-SCNC: 21 MMOL/L (ref 23–29)
CREAT SERPL-MCNC: 3.8 MG/DL (ref 0.5–1.4)
ERYTHROCYTE [DISTWIDTH] IN BLOOD BY AUTOMATED COUNT: 17.3 % (ref 11.5–14.5)
GFR SERPLBLD CREATININE-BSD FMLA CKD-EPI: 16 ML/MIN/1.73/M2
GLUCOSE SERPL-MCNC: 175 MG/DL (ref 70–110)
HCT VFR BLD AUTO: 33.4 % (ref 40–54)
HGB BLD-MCNC: 9.6 GM/DL (ref 14–18)
MAGNESIUM SERPL-MCNC: 2 MG/DL (ref 1.6–2.6)
MCH RBC QN AUTO: 23.9 PG (ref 27–31)
MCHC RBC AUTO-ENTMCNC: 28.7 G/DL (ref 32–36)
MCV RBC AUTO: 83 FL (ref 82–98)
PHOSPHATE SERPL-MCNC: 3.4 MG/DL (ref 2.7–4.5)
PLATELET # BLD AUTO: 346 K/UL (ref 150–450)
PMV BLD AUTO: 10.1 FL (ref 9.2–12.9)
POCT GLUCOSE: 185 MG/DL (ref 70–110)
POCT GLUCOSE: 186 MG/DL (ref 70–110)
POCT GLUCOSE: 192 MG/DL (ref 70–110)
POTASSIUM SERPL-SCNC: 4.2 MMOL/L (ref 3.5–5.1)
RBC # BLD AUTO: 4.02 M/UL (ref 4.6–6.2)
SODIUM SERPL-SCNC: 140 MMOL/L (ref 136–145)
WBC # BLD AUTO: 2.85 K/UL (ref 3.9–12.7)

## 2025-05-11 PROCEDURE — 27201012 HC FORCEPS, HOT/COLD, DISP: Performed by: INTERNAL MEDICINE

## 2025-05-11 PROCEDURE — 99232 SBSQ HOSP IP/OBS MODERATE 35: CPT | Mod: ,,, | Performed by: INTERNAL MEDICINE

## 2025-05-11 PROCEDURE — 63600175 PHARM REV CODE 636 W HCPCS: Performed by: INTERNAL MEDICINE

## 2025-05-11 PROCEDURE — 88305 TISSUE EXAM BY PATHOLOGIST: CPT | Mod: TC | Performed by: INTERNAL MEDICINE

## 2025-05-11 PROCEDURE — 63600175 PHARM REV CODE 636 W HCPCS: Performed by: NURSE ANESTHETIST, CERTIFIED REGISTERED

## 2025-05-11 PROCEDURE — 25000003 PHARM REV CODE 250: Performed by: INTERNAL MEDICINE

## 2025-05-11 PROCEDURE — 0DB78ZX EXCISION OF STOMACH, PYLORUS, VIA NATURAL OR ARTIFICIAL OPENING ENDOSCOPIC, DIAGNOSTIC: ICD-10-PCS | Performed by: INTERNAL MEDICINE

## 2025-05-11 PROCEDURE — 21400001 HC TELEMETRY ROOM

## 2025-05-11 PROCEDURE — 99499 UNLISTED E&M SERVICE: CPT | Mod: ,,, | Performed by: INTERNAL MEDICINE

## 2025-05-11 PROCEDURE — 80069 RENAL FUNCTION PANEL: CPT | Performed by: INTERNAL MEDICINE

## 2025-05-11 PROCEDURE — 94761 N-INVAS EAR/PLS OXIMETRY MLT: CPT

## 2025-05-11 PROCEDURE — 85027 COMPLETE CBC AUTOMATED: CPT | Performed by: INTERNAL MEDICINE

## 2025-05-11 PROCEDURE — 36415 COLL VENOUS BLD VENIPUNCTURE: CPT | Performed by: NURSE PRACTITIONER

## 2025-05-11 PROCEDURE — 99900035 HC TECH TIME PER 15 MIN (STAT)

## 2025-05-11 PROCEDURE — 83735 ASSAY OF MAGNESIUM: CPT | Performed by: NURSE PRACTITIONER

## 2025-05-11 RX ORDER — DOCUSATE SODIUM 100 MG/1
100 CAPSULE, LIQUID FILLED ORAL 2 TIMES DAILY
Status: COMPLETED | OUTPATIENT
Start: 2025-05-11 | End: 2025-05-11

## 2025-05-11 RX ORDER — POLYETHYLENE GLYCOL 3350, SODIUM SULFATE ANHYDROUS, SODIUM BICARBONATE, SODIUM CHLORIDE, POTASSIUM CHLORIDE 236; 22.74; 6.74; 5.86; 2.97 G/4L; G/4L; G/4L; G/4L; G/4L
4000 POWDER, FOR SOLUTION ORAL ONCE
Status: COMPLETED | OUTPATIENT
Start: 2025-05-11 | End: 2025-05-11

## 2025-05-11 RX ORDER — PROPOFOL 10 MG/ML
VIAL (ML) INTRAVENOUS
Status: DISCONTINUED | OUTPATIENT
Start: 2025-05-11 | End: 2025-05-11

## 2025-05-11 RX ORDER — SYRING-NEEDL,DISP,INSUL,0.3 ML 29 G X1/2"
296 SYRINGE, EMPTY DISPOSABLE MISCELLANEOUS ONCE
Status: COMPLETED | OUTPATIENT
Start: 2025-05-11 | End: 2025-05-11

## 2025-05-11 RX ORDER — DEXTROMETHORPHAN/PSEUDOEPHED 2.5-7.5/.8
DROPS ORAL
Status: COMPLETED | OUTPATIENT
Start: 2025-05-11 | End: 2025-05-11

## 2025-05-11 RX ORDER — LIDOCAINE HYDROCHLORIDE 10 MG/ML
INJECTION, SOLUTION EPIDURAL; INFILTRATION; INTRACAUDAL; PERINEURAL
Status: DISCONTINUED | OUTPATIENT
Start: 2025-05-11 | End: 2025-05-11

## 2025-05-11 RX ADMIN — DOCUSATE SODIUM 100 MG: 100 CAPSULE, LIQUID FILLED ORAL at 11:05

## 2025-05-11 RX ADMIN — METOPROLOL SUCCINATE 25 MG: 25 TABLET, EXTENDED RELEASE ORAL at 11:05

## 2025-05-11 RX ADMIN — PROPOFOL 30 MG: 10 INJECTION, EMULSION INTRAVENOUS at 10:05

## 2025-05-11 RX ADMIN — SIMETHICONE 200 MG: 20 SUSPENSION/ DROPS ORAL at 10:05

## 2025-05-11 RX ADMIN — MYCOPHENOLATE MOFETIL 500 MG: 250 CAPSULE ORAL at 11:05

## 2025-05-11 RX ADMIN — POLYETHYLENE GLYCOL 3350, SODIUM SULFATE ANHYDROUS, SODIUM BICARBONATE, SODIUM CHLORIDE, POTASSIUM CHLORIDE 4000 ML: 236; 22.74; 6.74; 5.86; 2.97 POWDER, FOR SOLUTION ORAL at 07:05

## 2025-05-11 RX ADMIN — INSULIN GLARGINE 5 UNITS: 100 INJECTION, SOLUTION SUBCUTANEOUS at 09:05

## 2025-05-11 RX ADMIN — FAMOTIDINE 20 MG: 20 TABLET, FILM COATED ORAL at 11:05

## 2025-05-11 RX ADMIN — ASPIRIN 81 MG: 81 TABLET, COATED ORAL at 11:05

## 2025-05-11 RX ADMIN — MAGNESIUM CITRATE 296 ML: 1.75 LIQUID ORAL at 04:05

## 2025-05-11 RX ADMIN — TACROLIMUS 2 MG: 1 CAPSULE ORAL at 11:05

## 2025-05-11 RX ADMIN — MYCOPHENOLATE MOFETIL 500 MG: 250 CAPSULE ORAL at 09:05

## 2025-05-11 RX ADMIN — PROPOFOL 50 MG: 10 INJECTION, EMULSION INTRAVENOUS at 10:05

## 2025-05-11 RX ADMIN — LIDOCAINE HYDROCHLORIDE 50 MG: 10 SOLUTION INTRAVENOUS at 10:05

## 2025-05-11 RX ADMIN — PREDNISONE 5 MG: 5 TABLET ORAL at 11:05

## 2025-05-11 RX ADMIN — TACROLIMUS 2 MG: 1 CAPSULE ORAL at 09:05

## 2025-05-11 RX ADMIN — FEBUXOSTAT 40 MG: 40 TABLET, FILM COATED ORAL at 11:05

## 2025-05-11 RX ADMIN — DOCUSATE SODIUM 100 MG: 100 CAPSULE, LIQUID FILLED ORAL at 09:05

## 2025-05-11 NOTE — SUBJECTIVE & OBJECTIVE
Subjective:     Interval History:   Will do an EGD today. Colonoscopy to be performed depending on EGD findings.       Objective:     Vital Signs (Most Recent):  Temp: 98 °F (36.7 °C) (05/11/25 0429)  Pulse: 94 (05/11/25 0502)  Resp: 18 (05/11/25 0429)  BP: 113/70 (05/11/25 0502)  SpO2: 97 % (05/11/25 0502) Vital Signs (24h Range):  Temp:  [96 °F (35.6 °C)-98 °F (36.7 °C)] 98 °F (36.7 °C)  Pulse:  [76-97] 94  Resp:  [17-18] 18  SpO2:  [92 %-100 %] 97 %  BP: (111-154)/(51-74) 113/70     Weight: 93.2 kg (205 lb 7.5 oz) (05/11/25 0502)  Body mass index is 27.87 kg/m².      Intake/Output Summary (Last 24 hours) at 5/11/2025 0706  Last data filed at 5/10/2025 1935  Gross per 24 hour   Intake 580 ml   Output 1 ml   Net 579 ml       Lines/Drains/Airways       Peripheral Intravenous Line  Duration                  Hemodialysis AV Fistula Right upper arm -- days         Peripheral IV - Single Lumen 05/07/25 1558 22 G Left;Posterior Hand 3 days                          Significant Labs:  Lab Results   Component Value Date    WBC 2.85 (L) 05/11/2025    HGB 9.6 (L) 05/11/2025    HCT 33.4 (L) 05/11/2025    MCV 83 05/11/2025     05/11/2025     BMP  Lab Results   Component Value Date     05/11/2025    K 4.2 05/11/2025     05/11/2025    CO2 21 (L) 05/11/2025    BUN 72 (H) 05/11/2025    CREATININE 3.8 (H) 05/11/2025    CALCIUM 9.6 05/11/2025    ANIONGAP 14 05/11/2025    EGFRNORACEVR 16 (L) 05/11/2025     Lab Results   Component Value Date    INR 1.1 01/06/2025    INR 1.0 06/18/2015    INR 1.0 06/12/2015     Lab Results   Component Value Date    ALT 30 05/08/2025    AST 32 05/08/2025    ALKPHOS 237 (H) 05/08/2025    BILITOT 0.4 05/08/2025             Significant Imaging:  Imaging results within the past 24 hours have been reviewed.

## 2025-05-11 NOTE — PLAN OF CARE
[id number] SB Whittaker is a 69 y.o.male admitted on 5/6/2025 for CKD (chronic kidney disease) stage 4, GFR 15-29 ml/min   Code Status: Full Code MRN: [id number]   Review of patient's allergies indicates:   Allergen Reactions    Lisinopril Other (See Comments)     Other reaction(s):  cough    Actos  [pioglitazone] Other (See Comments)     Other reaction(s): CHF    Metformin Other (See Comments)     Other reaction(s): renal insuff  Other reaction(s): CHF     Past Medical History:   Diagnosis Date    Acquired renal cyst of left kidney     Anemia associated with chronic renal failure     CAD (coronary artery disease)     nonobstructive lhc 9/14    CHF (congestive heart failure)     Chronic immunosuppression with Prograf and MMF 06/18/2015    Chronic venous insufficiency of lower extremity     CKD (chronic kidney disease) stage 3, GFR 30-59 ml/min     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 RNA NEGATIVE 5/2015, 2021, 2022    History of colon polyps     HPTH (hyperparathyroidism)     Hyperlipidemia     Hypertension associated with stage 3 chronic kidney disease due to type 2 diabetes mellitus     LBBB (left bundle branch block) 12/20/2021    Morbid obesity with BMI of 45.0-49.9, adult     Nephrolithiasis 6/7/2013    PCO (posterior capsular opacification), left 03/04/2019    Proteinuria     resolved s/p transplant    S/P kidney transplant     Sleep apnea     Type 2 diabetes, uncontrolled, with retinopathy     Type II diabetes mellitus with renal manifestations       PRN meds    0.9%  NaCl infusion (for blood administration), , Q24H PRN  0.9%  NaCl infusion (for blood  administration), , Q24H PRN  acetaminophen, 650 mg, Q8H PRN  dextrose 50%, 12.5 g, PRN  dextrose 50%, 25 g, PRN  glucagon (human recombinant), 1 mg, PRN  glucose, 16 g, PRN  glucose, 24 g, PRN  insulin aspart U-100, 0-5 Units, QID (AC + HS) PRN  ondansetron, 4 mg, Q12H PRN  sodium chloride 0.9%, 10 mL, PRN      Chart check completed. Will continue plan of care.      Orientation: oriented x 4  Tung Coma Scale Score: 15     Lead Monitored: Lead II Rhythm: normal sinus rhythm Frequency/Ectopy: PVCs, frequent  Cardiac/Telemetry Box Number: [id number]  VTE Core Measure: Pharmacological prophylaxis initiated/maintained Last Bowel Movement: 05/09/25  Diet NPO  Voiding Characteristics: incontinence  Dewayne Score: 15  Fall Risk Score: 11  Accucheck []   Freq?      Lines/Drains/Airways       Peripheral Intravenous Line  Duration                  Hemodialysis AV Fistula Right upper arm -- days         Peripheral IV - Single Lumen 05/06/25 1312 20 G 1 1/4 in Left Antecubital 4 days         Peripheral IV - Single Lumen 05/07/25 1558 22 G Left;Posterior Hand 3 days

## 2025-05-11 NOTE — BRIEF OP NOTE
Endoscopy Transfer Summary      Admit Date: 5/6/2025    Discharge Date and Time:  5/11/2025 10:20 AM    Attending Physician: Lindsey Ferreira MD     Transferring Physician: Jerel Lilly     Principal Admitting Diagnoses: CKD (chronic kidney disease) stage 4, GFR 15-29 ml/min         Diagnosis: The primary encounter diagnosis was Iron deficiency anemia due to chronic blood loss. Diagnoses of Chest pain, Systolic and diastolic CHF, acute on chronic, Primary hypertension, Pseudophakia, ANGELITO on CKD 4, CHF (congestive heart failure), History of colon polyps, Immunosuppression, CKD (chronic kidney disease) stage 4, GFR 15-29 ml/min, and Pancytopenia were also pertinent to this visit.     Transfer Condition: Good    Indication: CKD (chronic kidney disease) stage 4, GFR 15-29 ml/min     Significant Diagnostic Studies: EGD  The upper third of the esophagus, middle third of the esophagus, lower third of the esophagus, GE junction, cardia, fundus of the stomach, body of the stomach, antrum, prepyloric region, duodenal bulb, 2nd part of the duodenum, 3rd part of the duodenum and 4th part of the duodenum appeared normal.  Performed forceps biopsies in the antrum to rule out H. pylori    Pathology (if any):  Specimen (24h ago, onward)       Start     Ordered    05/11/25 1012  Specimen to Pathology Gastrointestinal tract  RELEASE UPON ORDERING        References:    Click here for ordering Quick Tip   Question:  Release to patient  Answer:  Immediate    05/11/25 1012                    Disposition: Transfer to [facility].     Bleeding: None    No Implants         Follow Up/Patient Instructions:   Current Discharge Medication List        CONTINUE these medications which have NOT CHANGED    Details   albuterol (PROVENTIL) 2.5 mg /3 mL (0.083 %) nebulizer solution Take 3 mLs (2.5 mg total) by nebulization every 4 to 6 hours as needed for Wheezing or Shortness of Breath.  Qty: 360 mL, Refills: 11    Comments:  ChronicObstructivePulmonaryDiseaseCode:[id number]  Associated Diagnoses: Mucopurulent chronic bronchitis      albuterol (PROVENTIL/VENTOLIN HFA) 90 mcg/actuation inhaler Inhale 2 puffs into the lungs every 4 (four) hours as needed for Wheezing or Shortness of Breath.  Qty: 8.5 g, Refills: 11    Comments: Dispense formulary  Associated Diagnoses: Mucopurulent chronic bronchitis      allopurinoL (ZYLOPRIM) 100 MG tablet Take ½ tablet (50 mg total) by mouth once daily.  Qty: 15 tablet, Refills: 11      apixaban (ELIQUIS) 5 mg Tab Take 1 tablet (5 mg total) by mouth 2 (two) times daily.  Qty: 180 tablet, Refills: 1      aspirin (ECOTRIN) 81 MG EC tablet Take 1 tablet by mouth Daily.       blood-glucose sensor (DEXCOM G7 SENSOR) Alba Use as directed for continuous glucose monitoring; Change every 10 days.  Qty: 3 each, Refills: 0    Comments: Dx E11.9 - patient on insulin 5 x day  Associated Diagnoses: Uncontrolled type 2 diabetes mellitus with hypoglycemia without coma; Type 2 diabetes mellitus with diabetic nephropathy, unspecified whether long term insulin use      calcitRIOL (ROCALTROL) 0.25 MCG Cap Take 1 capsule (0.25 mcg total) by mouth once daily.  Qty: 30 capsule, Refills: 11      famotidine (PEPCID) 20 MG tablet TAKE ONE TABLET BY MOUTH EVERY EVENING  Qty: 30 tablet, Refills: 11      ferrous sulfate (FEROSUL) 325 mg (65 mg iron) Tab tablet Take 1 tablet (325 mg total) by mouth daily with breakfast.  Qty: 30 tablet, Refills: 11    Associated Diagnoses: Anemia associated with chronic renal failure      fish oil-omega-3 fatty acids 300-1,000 mg capsule Take 1 g by mouth once daily.      furosemide (LASIX) 40 MG tablet Take 1 tablet (40 mg total) by mouth once daily.  Qty: 30 tablet, Refills: 11      HYDROcodone-acetaminophen (NORCO) 5-325 mg per tablet Take 1 tablet by mouth every 6 (six) hours as needed for Pain.  Qty: 20 tablet, Refills: 0    Comments: Quantity prescribed more than 7 day supply?  "No  Associated Diagnoses: Osteoarthritis of right knee, unspecified osteoarthritis type      !! insulin aspart U-100 (NOVOLOG FLEXPEN U-100 INSULIN) 100 unit/mL (3 mL) InPn pen Inject 25 Units into the skin 3 (three) times daily with meals.  Qty: 30 mL, Refills: 0      !! insulin aspart U-100 (NOVOLOG) 100 unit/mL (3 mL) InPn pen Inject 0-10 Units into the skin before meals and at bedtime as needed (Hyperglycemia). **MODERATE CORRECTION DOSE**  Blood Glucose  mg/dL                  Pre-meal                2200  151-200                2 units                    1 unit  201-250                4 units                    2 units  251-300                6 units                    3 units  301-350                8 units                    4 units  >350                     10 units                  5 units  Administer subcutaneously if needed at times designated by monitoring  schedule.  DO NOT HOLD correction dose insulin for patients who are  NPO.    "HIGH ALERT MEDICATION" - Administer with meals or TF/TPN.      insulin glargine U-100, Lantus, (LANTUS SOLOSTAR U-100 INSULIN) 100 unit/mL (3 mL) InPn pen Inject 15 Units into the skin 2 (two) times daily.  Qty: 9 mL, Refills: 0    Associated Diagnoses: Uncontrolled type 2 diabetes mellitus with hypoglycemia without coma      ketoconazole (NIZORAL) 200 mg Tab Take ½ tablet (100 mg total) by mouth once daily.  Qty: 45 tablet, Refills: 3    Comments: Take 1/2 (100 mg) po qd. Used in conjunction with other kidney transplant meds.      metoprolol succinate (TOPROL-XL) 25 MG 24 hr tablet Take 1 tablet (25 mg total) by mouth once daily.  Qty: 90 tablet, Refills: 3    Comments: .  Associated Diagnoses: Chronic combined systolic and diastolic congestive heart failure      multivitamin (THERAGRAN) tablet Take 1 tablet by mouth once daily.      mycophenolate (CELLCEPT) 250 mg Cap Take 2 capsules (500 mg total) by mouth 2 (two) times daily.  Qty: 120 capsule, Refills: 11    Comments: " Txp Date:6/12/2015 (Kidney) Disch Date:6/15/2015 ICD10:Z94.0 Txp Location:[facility]  Associated Diagnoses: Kidney replaced by transplant      potassium chloride SA (K-DUR,KLOR-CON) 20 MEQ tablet Take 1 tablet (20 mEq total) by mouth 2 (two) times daily.  Qty: 60 tablet, Refills: 11      predniSONE (DELTASONE) 5 MG tablet Take 1 tablet (5 mg total) by mouth once daily.  Qty: 30 tablet, Refills: 11    Associated Diagnoses: Kidney replaced by transplant      rosuvastatin (CRESTOR) 40 MG Tab Take 1 tablet (40 mg total) by mouth once daily.  Qty: 90 tablet, Refills: 3      semaglutide (OZEMPIC) 2 mg/dose (8 mg/3 mL) PnIj Inject 2 mg into the skin every 7 days.  Qty: 6 mL, Refills: 1    Associated Diagnoses: Severe obesity (BMI >= 40); Type 2 diabetes mellitus with other specified complication, with long-term current use of insulin      sodium bicarbonate 650 MG tablet Take 1 tablet (650 mg total) by mouth once daily.  Qty: 30 tablet, Refills: 11      tacrolimus (PROGRAF) 1 MG Cap Take 3 capsules (3 mg total) by mouth every morning AND 3 capsules (3 mg total) every evening. Until followup with nephrology.      triamcinolone acetonide 0.1% (KENALOG) 0.1 % ointment Apply topically 2 (two) times daily. Use on bilateral lower legs.  Qty: 454 g, Refills: 1    Associated Diagnoses: Venous stasis dermatitis, unspecified laterality       !! - Potential duplicate medications found. Please discuss with provider.          No discharge procedures on file.

## 2025-05-11 NOTE — ANESTHESIA PREPROCEDURE EVALUATION
05/11/2025  Mitch Whittaker is a 69 y.o., male.      Pre-op Assessment    I have reviewed the Patient Summary Reports.     I have reviewed the Nursing Notes. I have reviewed the NPO Status.   I have reviewed the Medications.     Review of Systems  Anesthesia Hx:  No problems with previous Anesthesia   History of prior surgery of interest to airway management or planning:  Previous anesthesia: MAC        Denies Family Hx of Anesthesia complications.    Denies Personal Hx of Anesthesia complications.                    Social:  Non-Smoker, No Alcohol Use       Hematology/Oncology:  Hematology Normal   Oncology Normal                                   EENT/Dental:  EENT/Dental Normal           Cardiovascular:  Exercise tolerance: good   Denies Pacemaker.  Denies Hypertension.   CAD    Dysrhythmias  Angina CHF      NYHA Classification I ECG has been reviewed.                            Pulmonary:        Sleep Apnea                Renal/:  Chronic Renal Disease                Hepatic/GI:      Liver Disease, Hepatitis              Musculoskeletal:  Musculoskeletal Normal                Neurological:    Neuromuscular Disease,                                   Endocrine:  Diabetes           Psych:  Psychiatric History                  Physical Exam  General: Well nourished, Cooperative, Alert and Oriented    Airway:  Mallampati: II   Mouth Opening: Normal  TM Distance: Normal  Tongue: Normal  Neck ROM: Normal ROM    Dental:  Intact    Problem List[1]     Anesthesia Plan  Type of Anesthesia, risks & benefits discussed:    Anesthesia Type: MAC  Intra-op Monitoring Plan: Standard ASA Monitors  Post Op Pain Control Plan: multimodal analgesia  Informed Consent: Informed consent signed with the Patient and all parties understand the risks and agree with anesthesia plan.  All questions answered.   ASA Score: 4  Day of  Surgery Review of History & Physical: I have interviewed and examined the patient. I have reviewed the patient's H&P dated:     Ready For Surgery From Anesthesia Perspective.     .           [1]   Patient Active Problem List  Diagnosis    ABL Anemia plus anemia of CKD 4    Class 3 severe obesity due to excess calories with body mass index (BMI) of 40.0 to 44.9 in adult    Acquired renal cyst of left kidney    Obstructive sleep apnea    Pseudophakia    Coronary artery disease of native artery of native heart with stable angina pectoris    Living-related donor kidney transplant (daughter) - 6/12/15    Diabetic sensorimotor polyneuropathy    Chronic immunosuppression with Prograf, MMF and prednisone    Secondary hyperparathyroidism of renal origin    Type II diabetes mellitus with renal manifestations    Hypertension associated with stage 3 chronic kidney disease due to type 2 diabetes mellitus    Uncontrolled type 2 diabetes mellitus with hypoglycemia without coma    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, with long-term current use of insulin    Epiretinal membrane (ERM) of both eyes    History of colon polyps    Benign prostatic hyperplasia without lower urinary tract symptoms    PCO (posterior capsular opacification), left    Acute on chronic combined systolic and diastolic congestive heart failure    Deep vein thrombosis (DVT) of lower extremity    Chronic venous insufficiency of lower extremity    Morbid obesity with BMI of 40.0-44.9, adult    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    LBBB (left bundle branch block)    Lambda light chain disease    Cellulitis of extremity    Head injury    Dizziness    Chronic anticoagulation    Fall    Hypotension due to hypovolemia    Severe obesity (BMI >= 40)    Orthostatic hypotension    History of renal transplant    Syncope and collapse    Dehydration     Chronic cough    Cervical radiculopathy    Bilateral carpal tunnel syndrome    Preop cardiovascular exam    Chronic diarrhea    Acute on chronic systolic congestive heart failure    Iron deficiency anemia due to chronic blood loss    ANGELITO on CKD 4    Hyperlipidemia associated with type 2 diabetes mellitus    Hyperkalemia    Gross hematuria    Debility    Right knee pain    UTI (urinary tract infection)    Anemia, chronic disease    C. difficile colitis    Gout    Sepsis    Cellulitis of left lower extremity    Hypomagnesemia    Hypertension    Obesity (BMI 30-39.9)    History of DVT (deep vein thrombosis)    Weakness of both lower extremities    Metabolic encephalopathy    Delirium    Chronic deep vein thrombosis (DVT) of other vein of left lower extremity    Mucopurulent chronic bronchitis    Pancytopenia    Immunosuppression    Arthralgia

## 2025-05-11 NOTE — ASSESSMENT & PLAN NOTE
Anemia is likely due to chronic blood loss, Iron deficiency, and chronic disease due to ESRD. Most recent hemoglobin and hematocrit are listed below.  Recent Labs     05/09/25  0534 05/10/25  1213 05/11/25  0515   HGB 6.8* 9.5* 9.6*   HCT 24.2* 32.3* 33.4*     Plan  - Monitor serial CBC: Daily  - Transfuse PRBC if patient becomes hemodynamically unstable, symptomatic or H/H drops below 7/21.  - Patient has received 2 units of PRBCs on 5/7, 5/9  - Patient's anemia is currently pending results  - gastroenterology consulted   Review of prior workup in past includes previous egd in 2024, negative for hpylori, negative for celiac and negative malignancy

## 2025-05-11 NOTE — PROGRESS NOTES
[facility] - Telemetry ([facility])  Gastroenterology  Progress Note    Patient Name: Mitch Whittaker  MRN: [id number]  Admission Date: 5/6/2025  Hospital Length of Stay: 5 days  Code Status: Full Code   Attending Provider: Lindsey Ferreira MD  Consulting Provider: Jerel Lilly MD  Primary Care Physician: Valery Caal MD  Principal Problem: CKD (chronic kidney disease) stage 4, GFR 15-29 ml/min        Subjective:     Interval History:   Will do an EGD today. Colonoscopy to be performed depending on EGD findings.       Objective:     Vital Signs (Most Recent):  Temp: 98 °F (36.7 °C) (05/11/25 0429)  Pulse: 94 (05/11/25 0502)  Resp: 18 (05/11/25 0429)  BP: 113/70 (05/11/25 0502)  SpO2: 97 % (05/11/25 0502) Vital Signs (24h Range):  Temp:  [96 °F (35.6 °C)-98 °F (36.7 °C)] 98 °F (36.7 °C)  Pulse:  [76-97] 94  Resp:  [17-18] 18  SpO2:  [92 %-100 %] 97 %  BP: (111-154)/(51-74) 113/70     Weight: 93.2 kg (205 lb 7.5 oz) (05/11/25 0502)  Body mass index is 27.87 kg/m².      Intake/Output Summary (Last 24 hours) at 5/11/2025 0706  Last data filed at 5/10/2025 1935  Gross per 24 hour   Intake 580 ml   Output 1 ml   Net 579 ml       Lines/Drains/Airways       Peripheral Intravenous Line  Duration                  Hemodialysis AV Fistula Right upper arm -- days         Peripheral IV - Single Lumen 05/07/25 1558 22 G Left;Posterior Hand 3 days                          Significant Labs:  Lab Results   Component Value Date    WBC 2.85 (L) 05/11/2025    HGB 9.6 (L) 05/11/2025    HCT 33.4 (L) 05/11/2025    MCV 83 05/11/2025     05/11/2025     BMP  Lab Results   Component Value Date     05/11/2025    K 4.2 05/11/2025     05/11/2025    CO2 21 (L) 05/11/2025    BUN 72 (H) 05/11/2025    CREATININE 3.8 (H) 05/11/2025    CALCIUM 9.6 05/11/2025    ANIONGAP 14 05/11/2025    EGFRNORACEVR 16 (L) 05/11/2025     Lab Results   Component Value Date    INR 1.1 01/06/2025    INR 1.0 06/18/2015    INR 1.0 06/12/2015     Lab Results    Component Value Date    ALT 30 05/08/2025    AST 32 05/08/2025    ALKPHOS 237 (H) 05/08/2025    BILITOT 0.4 05/08/2025             Significant Imaging:  Imaging results within the past 24 hours have been reviewed.  Assessment/Plan:     Oncology  Iron deficiency anemia due to chronic blood loss  Differential Diagnosis:  Prograf toxicity contributing to anemia via hematuria and leukopenia.  Chronic kidney disease (CKD)-related anemia due to impaired erythropoiesis.  Gastrointestinal bleeding (occult or upper GI source)--considering history of black stools. Will proceed with EGD/Colon in the morning.   Drug-induced anemia--Eliquis, though recently discontinued, may have contributed.  Iron-deficiency anemia--prior EGD in March 2024 was normal.  Malignancy-related anemia--less likely but should be ruled out.  CMV reactivation--unlikely given previously undetectable levels.    Plan & Recommendations:  Adjust immunosuppression: Lower Prograf dose given elevated levels (33).  Manage hyperkalemia: Follow nephrology recommendations.  Evaluate anemia: Serial Hgb monitoring, iron studies, reticulocyte count.   GI workup: Will perform upper endoscopy today due to recurring and persistent worsening anemia. Colonoscopy TBD.   Supportive care: Maintain hydration, assess renal function, monitor stool output.  Hematology consultation: If anemia worsens or alternative causes suspected.    GI  History of colon polyps  Colonoscopy to be scheduled in the future depending on the EGD findings.        Thank you for your consult. I will follow-up with patient. Please contact us if you have any additional questions.    Jerel Lilly MD  Gastroenterology  [facility])

## 2025-05-11 NOTE — ASSESSMENT & PLAN NOTE
Nephrology following   Continue Prograf, Prednisone, Cellcept   Prograf level 10 - decreased to 2mg/2mg  Awaiting tacrolimus level in am

## 2025-05-11 NOTE — ASSESSMENT & PLAN NOTE
Patient with known non obstructive CAD , which is controlled Will continue BB, ASA, and Statin and monitor for S/Sx of angina/ACS. Continue to monitor on telemetry.   Hb 6.8 warranting another blood transfusion  Status post transfusion  Hb/hct responded

## 2025-05-11 NOTE — ASSESSMENT & PLAN NOTE
Differential Diagnosis:  Prograf toxicity contributing to anemia via hematuria and leukopenia.  Chronic kidney disease (CKD)-related anemia due to impaired erythropoiesis.  Gastrointestinal bleeding (occult or upper GI source)--considering history of black stools. Will proceed with EGD/Colon in the morning.   Drug-induced anemia--Eliquis, though recently discontinued, may have contributed.  Iron-deficiency anemia--prior EGD in March 2024 was normal.  Malignancy-related anemia--less likely but should be ruled out.  CMV reactivation--unlikely given previously undetectable levels.    Plan & Recommendations:  Adjust immunosuppression: Lower Prograf dose given elevated levels (33).  Manage hyperkalemia: Follow nephrology recommendations.  Evaluate anemia: Serial Hgb monitoring, iron studies, reticulocyte count.   GI workup: Will perform upper endoscopy today due to recurring and persistent worsening anemia. Colonoscopy TBD.   Supportive care: Maintain hydration, assess renal function, monitor stool output.  Hematology consultation: If anemia worsens or alternative causes suspected.

## 2025-05-11 NOTE — ASSESSMENT & PLAN NOTE
Patients blood pressure range in the last 24 hours was: BP  Min: 102/60  Max: 154/55.The patient's inpatient anti-hypertensive regimen is listed below:  Current Antihypertensives  metoprolol succinate (TOPROL-XL) 24 hr tablet 25 mg, Daily, Oral    Plan  - BP is controlled, no changes needed to their regimen

## 2025-05-11 NOTE — PROGRESS NOTES
"[facility] - Tele[facility] ([facility] Medicine  Progress Note    Patient Name: Mitch Whittaker  MRN: [id number]  Patient Class: IP- Inpatient   Admission Date: 5/6/2025  Length of Stay: 5 days  Attending Physician: Lindsey Ferreira MD  Primary Care Provider: Valery Caal MD        Subjective     Principal Problem:CKD (chronic kidney disease) stage 4, GFR 15-29 ml/min        HPI:  The patient is a 70yo male with CAD, Combined CHF EF 35 - 40%, s/p Renal transplant 2015, Chronic immunosuppression, CKD3, DM, Anemia, HLD, HTN, Hx DVT- on Eliquis, Gout, Chronic venous stasis and recurrent skin infections, and Morbid obesity who presented to the ED with abdominal pain. Pt reports lower back pain radiating to his abdomen describes as an aching pain rated 8/10 that has occurred on/off for the last 3 days. Pt denies fever, chills, chest pain, SOB, cough, N/V, Diarrhea, or constipation. He reports a normal BM yesterday. Pt also complains of worsening weakness and pain to arms and legs which he attributs to gout. He reports pain is worse to his hands, left knee, and ankles. He reports his left knee has been swollen "for awhile". He reports chronic swelling to BLE. Pt is bed bound at home and lives with his wife and daughters who are his caretakers.   Pt reports abdominal pain has resolved since arrival to ED     In the ED, Afebrile, VSS, oxygenation normal. Labs revealed mild leukopenia with WBC 3.4, Hgb 7.3, BUN 79, sCr 5.7 (baseline 1.9-2.8), Albumin 1.5, , Trop 0.067>0.062. EKG showed sinus tachycardia -rate 108, PACs, nonspecific intraventricular block   CT chest/abd/pelvis showed Small left pleural effusion, Nonspecific distention of the gallbladder, No gallbladder wall thickening or bile duct dilatation, Fatty liver, Native kidneys are small and atrophic, No hydronephrosis, No ureteral stones, Right renal transplant.    Ed provider discussed care with nephrologist, Dr. Gonsalez who recommended IV Lasix drip plus " metolazone as needed 5-10 mg per dose     Overview/Hospital Course:  Patient is a 69-year-old male with a history of coronary artery disease, biventricular CHF with an EF of 35-40%, status post living related donor kidney transplant 2015 on chronic immunosuppression, ANGELITO on CKD 3, diabetes mellitus, anemia, hypertension, history of DVT on Eliquis, gout, chronic venous stasis, morbid obesity.  Patient presented to the emergency department complaining of back pain radiating to his abdomen 8/10.  He reports it is has been occurring for approximately 3 days prior to admission.  Health who complains of generalized body ache worse pain in his legs and knees.  He states this has been going on for quite some time.  Emergency department labs revealed a white count of 3.4 hemoglobin 7.3 creatinine of 5.7 which is up from about 3.  Albumin is 1.5.  CT scan chest abdomen and pelvis revealed a small left pleural effusion otherwise none specific.    Patient was seen by his nephrologist Dr. Gonsalez who initiated Lasix drip with metolazone.  After review by rounding nephrologist it was felt that he was maybe slightly over diuresed.  Diuretics were held and patient was started on normal saline at 100 cc an hour for 2 L.  Prograf level noted to be 10 and Prograf decreased to 2 mg b.i.d. recheck level in a.m.  Markedly elevated uric acid, patient was initiated on Uloric.  Seen and examined by Physical therapy/Occupational therapy who felt that patient was at his baseline status and declined further intervention.      Echo:    Left Ventricle: The left ventricle is normal in size. Moderately increased ventricular mass. Moderately increased wall thickness. There is moderate concentric hypertrophy. Normal wall motion. Septal motion is consistent with bundle branch block. There is moderately reduced systolic function with a visually estimated ejection fraction of 35 - 40%. Grade I diastolic dysfunction.    Right Ventricle: The right  ventricle is normal in size Wall thickness is normal. Systolic function is normal.    Mitral Valve: Mildly calcified leaflets. There is mild mitral annular calcification.    Tricuspid Valve: There is mild regurgitation.    Aorta: The aortic annulus is mildly dilated measuring 4.0 cm. The aortic root is normal in size. The ascending aorta is normal in size measuring 3.3 cm.  5/9 agreeable to blood transfusion. Denies overt signs or symptoms of bleeding. Renal function stable. Continue fluids. Nephrology managing immunosuppressant medication(s)   5/10 hb/hct pending. Stool occult positive. Gastroenterology consulted. Denies any symptoms   5/11 hb/hct responded after blood transfusion. Gastroenterology consulted and recommended egd today. Eliquis on hold.     Interval History: See hospital course for today      Review of Systems   Constitutional:  Positive for activity change (chronic) and fatigue (chronic).   Cardiovascular:  Positive for leg swelling. Negative for chest pain.   Gastrointestinal:  Negative for abdominal pain, nausea and vomiting.   Musculoskeletal:  Positive for myalgias.   Allergic/Immunologic: Positive for immunocompromised state.   Neurological:  Positive for weakness (chronic).   Psychiatric/Behavioral:  Negative for agitation, behavioral problems, confusion, decreased concentration and dysphoric mood. The patient is not nervous/anxious.      Objective:     Vital Signs (Most Recent):  Temp: 98 °F (36.7 °C) (05/11/25 0429)  Pulse: 88 (05/11/25 0830)  Resp: 18 (05/11/25 0715)  BP: 122/72 (05/11/25 0715)  SpO2: (!) 94 % (05/11/25 0715) Vital Signs (24h Range):  Temp:  [96 °F (35.6 °C)-98 °F (36.7 °C)] 98 °F (36.7 °C)  Pulse:  [76-97] 88  Resp:  [17-18] 18  SpO2:  [92 %-100 %] 94 %  BP: (111-154)/(51-74) 122/72     Weight: 93.2 kg (205 lb 7.5 oz)  Body mass index is 27.87 kg/m².    Intake/Output Summary (Last 24 hours) at 5/11/2025 0840  Last data filed at 5/10/2025 1935  Gross per 24 hour   Intake  580 ml   Output 1 ml   Net 579 ml         Physical Exam  Vitals and nursing note reviewed. Exam conducted with a chaperone present (nursing).   Constitutional:       General: He is not in acute distress.     Appearance: He is ill-appearing (chronic). He is not toxic-appearing.   HENT:      Head: Normocephalic and atraumatic.   Cardiovascular:      Rate and Rhythm: Normal rate.   Pulmonary:      Effort: Pulmonary effort is normal. No respiratory distress.   Abdominal:      Palpations: Abdomen is soft.      Tenderness: There is no abdominal tenderness.   Musculoskeletal:      Right lower leg: Edema present.      Left lower leg: Edema present.   Skin:     General: Skin is warm.   Neurological:      Mental Status: He is alert. Mental status is at baseline.      Motor: Weakness present.   Psychiatric:         Mood and Affect: Mood normal.         Behavior: Behavior normal.               Significant Labs: All pertinent labs within the past 24 hours have been reviewed.  CBC:   Recent Labs   Lab 05/10/25  1213 05/11/25  0515   WBC 2.99* 2.85*   HGB 9.5* 9.6*   HCT 32.3* 33.4*    346     CMP:   Recent Labs   Lab 05/10/25  0612 05/11/25  0515    140   K 4.4 4.2    105   CO2 21* 21*   * 175*   BUN 78* 72*   CREATININE 4.6* 3.8*   CALCIUM 9.5 9.6   ALBUMIN 1.6* 1.6*   ANIONGAP 13 14     POCT Glucose:   Recent Labs   Lab 05/10/25  1628 05/10/25  2107 05/11/25  0617   POCTGLUCOSE 257* 229* 192*     Tacrolimus level pending in am    Significant Imaging: I have reviewed all pertinent imaging results/findings within the past 24 hours.  Egd pending      Assessment & Plan  Acute on chronic combined systolic and diastolic congestive heart failure  Patient has Combined Systolic and Diastolic heart failure that is Acute on chronic. On presentation their CHF was decompensated. Evidence of decompensated CHF on presentation includes: edema, elevated JVD, and worsening renal function. The etiology of their  "decompensation is likely dietary indiscretion and increased fluid intake. Most recent BNP and echo results are listed below.  No results for input(s): "BNP" in the last 72 hours.    Latest ECHO  Results for orders placed during the hospital encounter of 03/13/25    Echo Saline Bubble? No    Interpretation Summary    Left Ventricle: The left ventricle is normal in size. Mildly increased ventricular mass. Mildly increased wall thickness. There is mild concentric hypertrophy. Normal wall motion. Septal motion is consistent with bundle branch block. There is moderately reduced systolic function with a visually estimated ejection fraction of 35 - 40%. Grade I diastolic dysfunction.    Right Ventricle: The right ventricle is normal in size. Wall thickness is normal. Systolic function is normal.    Left Atrium: Mildly dilated    Aorta: Aortic annulus is mildly dilated measuring 4.01 cm. Ascending aorta is normal measuring 3.69 cm.    Pulmonary Artery: The estimated pulmonary artery systolic pressure is 24 mmHg.    IVC/SVC: Normal venous pressure at 3 mmHg.    Current Heart Failure Medications  metoprolol succinate (TOPROL-XL) 24 hr tablet 25 mg, Daily, Oral    Plan  - Monitor strict I&Os and daily weights.    - Place on telemetry  - Low sodium diet  - Place on fluid restriction of 1.5 L.   - Cardiology has not been consulted  - The patient's volume status is worsening as indicated by edema and elevated JVD. Will continue current treatment     Resolved       ANGELITO on CKD 4  ANGELITO is likely due to CHF Baseline creatinine is 1.9-2.8. Most recent creatinine and eGFR are listed below.  Recent Labs     05/09/25  0704 05/10/25  0612 05/11/25  0515   CREATININE 5.4* 4.6* 3.8*   EGFRNORACEVR 11* 13* 16*      Plan  - ANGELITO is worsening. Will continue current treatment  - Avoid nephrotoxins and renally dose meds for GFR listed above  - Monitor urine output, serial BMP, and adjust therapy as needed  Urinalysis with trace protein trace " blood, urine protein creatinine ratio 0.49  Continues to improve  Obstructive sleep apnea  CPAP hs    Coronary artery disease of native artery of native heart with stable angina pectoris  Patient with known non obstructive CAD , which is controlled Will continue BB, ASA, and Statin and monitor for S/Sx of angina/ACS. Continue to monitor on telemetry.   Hb 6.8 warranting another blood transfusion  Status post transfusion  Hb/hct responded   Living-related donor kidney transplant (daughter) - 6/12/15  Nephrology following   Continue Prograf, Prednisone, Cellcept   Prograf level 10 - decreased to 2mg/2mg  Awaiting tacrolimus level in am    Secondary hyperparathyroidism of renal origin  Stable   Cont Calcitriol     Type II diabetes mellitus with renal manifestations  Patient's FSGs are controlled on current medication regimen.  Last A1c reviewed-   Lab Results   Component Value Date    HGBA1C 6.3 (H) 05/07/2025     Most recent fingerstick glucose reviewed-   Recent Labs   Lab 05/10/25  1148 05/10/25  1628 05/10/25  2107 05/11/25  0617   POCTGLUCOSE 283* 257* 229* 192*     Current correctional scale  Low  Maintain anti-hyperglycemic dose as follows-   Antihyperglycemics (From admission, onward)      Start     Stop Route Frequency Ordered    05/06/25 2100  insulin glargine U-100 (Lantus) pen 5 Units         -- SubQ 2 times daily 05/06/25 1644    05/06/25 1813  insulin aspart U-100 pen 0-5 Units         -- SubQ Before meals & nightly PRN 05/06/25 1713          Hold Oral hypoglycemics while patient is in the hospital.    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 The patient's latest AMPAC (Activity Measure for Post Acute Care) Score is listed below.    AM-PAC Score - How much help does the patient need for each activity listed  Basic Mobility Total Score: 6  Turning over in bed (including adjusting bedclothes, sheets and blankets)?: Unable  Sitting down on and standing up from a chair with arms (e.g., wheelchair, bedside commode, etc.): Unable  Moving from lying on back to sitting on the side of the bed?: Unable  Moving to and from a bed to a chair (including a wheelchair)?: Unable  Need to walk in hospital room?: Unable  Climbing 3-5 steps with a railing?: Unable    Plan  - Progressive mobility protocol initated  - Fall precautions in place    Bedbound at baseline  Has been to skilled nursing facility in the past      Gout  Elevated uric acid, cont Allopurinol   On uloric     History of DVT (deep vein thrombosis)  Stable   On Eliquis   With hb/hct downtrending with concerns for GI bleed   Hold eliquis  Immunosuppression  Nephrology recommended continuing pt's home medications Prograf, Cellcept, and Prednisone   Tacrolimus level pending    Arthralgia  Uric acid 11.5  Markedly elevated inflammatory markers  On Uloric    ABL Anemia plus anemia of CKD 4  Anemia is likely due to chronic blood loss, Iron deficiency, and chronic disease due to ESRD. Most recent hemoglobin and hematocrit are listed below.  Recent Labs     05/09/25  0534 05/10/25  1213 05/11/25  0515   HGB 6.8* 9.5* 9.6*   HCT 24.2* 32.3* 33.4*     Plan  - Monitor serial CBC: Daily  - Transfuse PRBC if patient becomes hemodynamically unstable, symptomatic or H/H drops below 7/21.  - Patient has received 2 units of PRBCs on 5/7, 5/9  - Patient's anemia is currently pending results  - gastroenterology consulted    Review of prior workup in past includes previous egd in 2024, negative for hpylori, negative for celiac and negative malignancy    History of colon polyps  Review of prior colonoscopies include 2018 with polypectomy, 2022 with polypectomy with adenoma with focal cryptitis, and 2023 with no inflammation, no malignancy on polyp biopsy    Iron deficiency anemia due to chronic blood loss  Anemia is likely due to chronic blood loss, Iron deficiency, and chronic disease due to Chronic Kidney Disease. Most recent hemoglobin and hematocrit are listed below.  Recent Labs     05/09/25  0534 05/10/25  1213 05/11/25  0515   HGB 6.8* 9.5* 9.6*   HCT 24.2* 32.3* 33.4*     Plan  - Monitor serial CBC: Daily  - Transfuse PRBC if patient becomes hemodynamically unstable, symptomatic or H/H drops below 7/21.  - Patient has received 2 units of PRBCs on 5/7, 5/9  - Patient's anemia is currently improving  - egd pending for further workup of anemia requiring blood transfusions      VTE Risk Mitigation (From admission, onward)           Ordered     apixaban tablet 5 mg  2 times daily         05/10/25 1044     IP VTE HIGH RISK PATIENT  Once         05/06/25 1644     Place sequential compression device  Until discontinued         05/06/25 1644                    Discharge Planning   GRETEL: 5/12/2025     Code Status: Full Code   Medical Readiness for Discharge Date:   Discharge Plan A: Home Health, Home with family                      Lindsey Ferreira MD  Department of Hospital Medicine   [facility])

## 2025-05-11 NOTE — ASSESSMENT & PLAN NOTE
Nephrology recommended continuing pt's home medications Prograf, Cellcept, and Prednisone   Tacrolimus level pending

## 2025-05-11 NOTE — SUBJECTIVE & OBJECTIVE
Interval History: See hospital course for today      Review of Systems   Constitutional:  Positive for activity change (chronic) and fatigue (chronic).   Cardiovascular:  Positive for leg swelling. Negative for chest pain.   Gastrointestinal:  Negative for abdominal pain, nausea and vomiting.   Musculoskeletal:  Positive for myalgias.   Allergic/Immunologic: Positive for immunocompromised state.   Neurological:  Positive for weakness (chronic).   Psychiatric/Behavioral:  Negative for agitation, behavioral problems, confusion, decreased concentration and dysphoric mood. The patient is not nervous/anxious.      Objective:     Vital Signs (Most Recent):  Temp: 98 °F (36.7 °C) (05/11/25 0429)  Pulse: 88 (05/11/25 0830)  Resp: 18 (05/11/25 0715)  BP: 122/72 (05/11/25 0715)  SpO2: (!) 94 % (05/11/25 0715) Vital Signs (24h Range):  Temp:  [96 °F (35.6 °C)-98 °F (36.7 °C)] 98 °F (36.7 °C)  Pulse:  [76-97] 88  Resp:  [17-18] 18  SpO2:  [92 %-100 %] 94 %  BP: (111-154)/(51-74) 122/72     Weight: 93.2 kg (205 lb 7.5 oz)  Body mass index is 27.87 kg/m².    Intake/Output Summary (Last 24 hours) at 5/11/2025 0840  Last data filed at 5/10/2025 1935  Gross per 24 hour   Intake 580 ml   Output 1 ml   Net 579 ml         Physical Exam  Vitals and nursing note reviewed. Exam conducted with a chaperone present (nursing).   Constitutional:       General: He is not in acute distress.     Appearance: He is ill-appearing (chronic). He is not toxic-appearing.   HENT:      Head: Normocephalic and atraumatic.   Cardiovascular:      Rate and Rhythm: Normal rate.   Pulmonary:      Effort: Pulmonary effort is normal. No respiratory distress.   Abdominal:      Palpations: Abdomen is soft.      Tenderness: There is no abdominal tenderness.   Musculoskeletal:      Right lower leg: Edema present.      Left lower leg: Edema present.   Skin:     General: Skin is warm.   Neurological:      Mental Status: He is alert. Mental status is at baseline.       Motor: Weakness present.   Psychiatric:         Mood and Affect: Mood normal.         Behavior: Behavior normal.               Significant Labs: All pertinent labs within the past 24 hours have been reviewed.  CBC:   Recent Labs   Lab 05/10/25  1213 05/11/25  0515   WBC 2.99* 2.85*   HGB 9.5* 9.6*   HCT 32.3* 33.4*    346     CMP:   Recent Labs   Lab 05/10/25  0612 05/11/25  0515    140   K 4.4 4.2    105   CO2 21* 21*   * 175*   BUN 78* 72*   CREATININE 4.6* 3.8*   CALCIUM 9.5 9.6   ALBUMIN 1.6* 1.6*   ANIONGAP 13 14     POCT Glucose:   Recent Labs   Lab 05/10/25  1628 05/10/25  2107 05/11/25  0617   POCTGLUCOSE 257* 229* 192*     Tacrolimus level pending in am    Significant Imaging: I have reviewed all pertinent imaging results/findings within the past 24 hours.  Egd pending

## 2025-05-11 NOTE — ASSESSMENT & PLAN NOTE
Patient's FSGs are controlled on current medication regimen.  Last A1c reviewed-   Lab Results   Component Value Date    HGBA1C 6.3 (H) 05/07/2025     Most recent fingerstick glucose reviewed-   Recent Labs   Lab 05/10/25  1148 05/10/25  1628 05/10/25  2107 05/11/25  0617   POCTGLUCOSE 283* 257* 229* 192*     Current correctional scale  Low  Maintain anti-hyperglycemic dose as follows-   Antihyperglycemics (From admission, onward)      Start     Stop Route Frequency Ordered    05/06/25 2100  insulin glargine U-100 (Lantus) pen 5 Units         -- SubQ 2 times daily 05/06/25 1644    05/06/25 1813  insulin aspart U-100 pen 0-5 Units         -- SubQ Before meals & nightly PRN 05/06/25 1713          Hold Oral hypoglycemics while patient is in the hospital.    Cont Lantus- titrate   Hyperglycemia, on prednisone  Adjust insulin as needed

## 2025-05-11 NOTE — ASSESSMENT & PLAN NOTE
Review of prior colonoscopies include 2018 with polypectomy, 2022 with polypectomy with adenoma with focal cryptitis, and 2023 with no inflammation, no malignancy on polyp biopsy

## 2025-05-11 NOTE — PROGRESS NOTES
Nephrology Progress Note     History of Present Illness     69-year-old gentleman with history of ESRD.  He underwent living related donor transplantation in 2015 at [facility].  He has known chronic allograft nephropathy with baseline creatinine running 2-3.  Labile nature of creatinine in part due to his known combined systolic and diastolic heart failure with LVEF of 40%.  Outpatient immunosuppression consists of tacrolimus 2 mg twice daily with 100 mg of ketoconazole for augmentation daily, mycophenolate 500 mg twice daily, and prednisone 5 mg daily.  Followed by Dr. Gonsalez locally.  Seen in telemedicine by Dr. Gonsalez around May 1st.  Was complaining about some dyspnea.  Subsequently admitted May 6 with progressing dyspnea.  Found to have heart failure exacerbation exacerbated by his renal dysfunction.  He also had some abdominal discomfort which has improved.  He had multiple other complaints including generalized weakness and extremity pain which she attributed to gout.  Creatinine on admission was above baseline at 5.7.  Seen by his usual nephrologist who initiated furosemide drip.  He has been diuresed.  Furosemide drip has since been stopped.  Creatinine unchanged for the 1st several days of hospital stay but now slowly improving.  His allograft nephropathy has been characterized by proteinuria.  UPCR field about 1.5 g of proteinuria.  Etiology of ANGELITO somewhat unclear.  Patient on statin outpatient.  CPK checked and that was unremarkable.  Creatinine has been done to improve.  Has had very significant anemia during this hospital stay.  Has required multiple units of packed red blood cells.  Platelet count has decreased significantly that remains very normal.  Consider possibility of TMA.  LDH is modestly elevated at 370 (110-260).  Note that tacrolimus can be associated with TMA.  For, haptoglobin was actually high.  No convincing evidence for hemolysis.  Unclear etiology of elevated LDH.     Tacrolimus level  he done here initially modestly elevated at 10.  Reasonable goal would be in the range of 6-8.  Patient was on ketoconazole outpatient to augment levels.  Not getting it here.  We will repeat level on 5/12.      The patient is very debilitated.  He has essentially been bed-bound for 3 months.  He has denied any knowledge of blood in his stool but is wearing diapers given his inability to ambulate to the bathroom.    Interval History     Overnight/currently:  Afebrile.  Vital signs stable.  Urine output not accurately measured.  Regardless, creatinine improving nicely now.  Hemoglobin stable overnight.  Remains hypercalcemic with corrected calcium running about 11.6.  Holding calcitriol as below.  Seen by GI regarding aforementioned GI bleeding.  They plan to perform EGD today and perhaps colonoscopy later if EGD unrevealing.  Patient feeling well currently and has no chest pain or shortness of breath.  No nausea or vomiting.     Health Status   Allergies:    is allergic to lisinopril, actos  [pioglitazone], and metformin.    Current medications:   Current Medications[1]     Physical Examination   VS/Measurements   /72 (BP Location: Left arm, Patient Position: Lying)   Pulse 86   Temp 98 °F (36.7 °C) (Oral)   Resp 18   Ht 6' (1.829 m)   Wt 93.2 kg (205 lb 7.5 oz)   SpO2 (!) 94%   BMI 27.87 kg/m²      General:  Alert and oriented X3, No acute distress.         Nutritional status: Obese.    Neck:  Supple, No lymphadenopathy.     Respiratory:  Lungs are clear to auscultation, Respirations are non-labored, Symmetrical chest wall expansion.    Cardiovascular:  Normal rate, Regular rhythm.   Gastrointestinal:  Soft, Non-tender, Normal bowel sounds.   Integumentary:  Warm, Dry.   Psychiatric:  Cooperative, Appropriate mood & affect.        Review / Management   Laboratory Results   Today's Lab Results :    Recent Results (from the past 24 hours)   POCT glucose    Collection Time: 05/10/25 11:48 AM   Result  Value Ref Range    POCT Glucose 283 (H) 70 - 110 mg/dL   CBC with Differential    Collection Time: 05/10/25 12:13 PM   Result Value Ref Range    WBC 2.99 (L) 3.90 - 12.70 K/uL    RBC 3.95 (L) 4.60 - 6.20 M/uL    HGB 9.5 (L) 14.0 - 18.0 gm/dL    HCT 32.3 (L) 40.0 - 54.0 %    MCV 82 82 - 98 fL    MCH 24.1 (L) 27.0 - 31.0 pg    MCHC 29.4 (L) 32.0 - 36.0 g/dL    RDW 17.2 (H) 11.5 - 14.5 %    Platelet Count 329 150 - 450 K/uL    MPV 9.6 9.2 - 12.9 fL    Nucleated RBC 0 <=0 /100 WBC   Manual Differential    Collection Time: 05/10/25 12:13 PM   Result Value Ref Range    Gran # (ANC) 1.7 K/uL    Segmented Neutrophil % 58.0 38.0 - 73.0 %    Lymphocyte % 34.0 18.0 - 48.0 %    Monocyte % 6.0 4.0 - 15.0 %    Eosinophil % 2.0 0.0 - 8.0 %    Platelet Estimate Appears Normal     POCT glucose    Collection Time: 05/10/25  4:28 PM   Result Value Ref Range    POCT Glucose 257 (H) 70 - 110 mg/dL   POCT glucose    Collection Time: 05/10/25  9:07 PM   Result Value Ref Range    POCT Glucose 229 (H) 70 - 110 mg/dL   Magnesium    Collection Time: 05/11/25  5:15 AM   Result Value Ref Range    Magnesium  2.0 1.6 - 2.6 mg/dL   Renal Function Panel    Collection Time: 05/11/25  5:15 AM   Result Value Ref Range    Sodium 140 136 - 145 mmol/L    Potassium 4.2 3.5 - 5.1 mmol/L    Chloride 105 95 - 110 mmol/L    CO2 21 (L) 23 - 29 mmol/L    Glucose 175 (H) 70 - 110 mg/dL    BUN 72 (H) 8 - 23 mg/dL    Creatinine 3.8 (H) 0.5 - 1.4 mg/dL    Calcium 9.6 8.7 - 10.5 mg/dL    Albumin 1.6 (L) 3.5 - 5.2 g/dL    Phosphorus Level 3.4 2.7 - 4.5 mg/dL    Anion Gap 14 8 - 16 mmol/L    eGFR 16 (L) >60 mL/min/1.73/m2   CBC Without Differential    Collection Time: 05/11/25  5:15 AM   Result Value Ref Range    WBC 2.85 (L) 3.90 - 12.70 K/uL    RBC 4.02 (L) 4.60 - 6.20 M/uL    HGB 9.6 (L) 14.0 - 18.0 gm/dL    HCT 33.4 (L) 40.0 - 54.0 %    MCV 83 82 - 98 fL    MCHC 28.7 (L) 32.0 - 36.0 g/dL    RDW 17.3 (H) 11.5 - 14.5 %    Platelet Count 346 150 - 450 K/uL    MCH  23.9 (L) 27.0 - 31.0 pg    MPV 10.1 9.2 - 12.9 fL   POCT glucose    Collection Time: 05/11/25  6:17 AM   Result Value Ref Range    POCT Glucose 192 (H) 70 - 110 mg/dL        Impression and Plan   Diagnosis     Acute on chronic heart failure exacerbation in setting of combined systolic and diastolic heart failure.  Volume not a problem currently.     Kidney transplant status.  On usual immunosuppressive but not receiving aforementioned ketoconazole.  Hold off on that given elevated level of tacrolimus on admission.  Follow tacrolimus level in a.m.  I spoke with nurse.  This patient has been getting his tacrolimus at 9:00 a.m. and I p.m. despite at ordered at 8 and 8.  Nurse confirms notable be given at 9 and 9 and I stressed the importance of doing so that I can order tests for Prograf level at proper time.  I asked that morning nurse passes information on to night nurse and that they not give a.m. dose until tacrolimus level is drawn.     ANGELITO superimposed upon CKD.  Unclear etiology but possibly simply decompensated heart failure.  Creatinine now improving.  Supportive care.     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  Baseline creatinine labile in setting of heart failure.  Follow labs serially.     Hypertension and CKD. Adequately controlled.       Multifactorial anemia.  Has baseline anemia in setting of CKD.  On QUINTEN and receiving IV iron.  However, here has required multiple units of packed red blood cells.  Stool occult blood positive.  May need GI evaluation.  Discussed with hospital medicine.  Does have elevated LDH.  However, haptoglobin normal.  GI Ca inpatient and plans for EGD today and possibly colonoscopy at later time.     CKD-MBD/secondary hyperparathyroidism of renal origin.  Hypophosphatemic on admission but that has improved.  Improved on sevelamer but has not sevelamer due to concerns about potential interactions with transplant medications.  Patient's calcium markedly elevated.  It was on calcitriol.   That has been stopped.  If he does not improve we will need further evaluation for etiology of hypercalcemia.  We will check PTH in a.m. may need further evaluation to include paraproteins and PTH-rP.  Note that his thyroid function was unremarkable on recent testing.     History of deep venous thrombosis.       Gout.  On febuxostat.     Type 2 diabetes.  Well-controlled.  Per Hospital Medicine.       History of coronary artery disease.      Lower extremity edema.  Part of this is simply 3rd spacing in setting of severe hypoalbuminemia.     Severe hypoalbuminemia.       Sleep apnea.     Failure to thrive.  Bed-bound.  Patient/family would benefit from discussion regarding realistic goals of care.  From my perspective DNR status is likely appropriate.    ______________________________________________   Grace Schaefer MD    This document was created using voice recognition software.  It is possible that there are errors which have persisted after original proofreading.  If there is a question regarding contents of this document please contact me for clarification.         [1]   Current Facility-Administered Medications:     0.9%  NaCl infusion (for blood administration), , Intravenous, Q24H PRN, Ann Lazo MD, New Bag at 05/09/25 0805    0.9%  NaCl infusion (for blood administration), , Intravenous, Q24H PRN, Lindsey Ferreira MD    acetaminophen tablet 650 mg, 650 mg, Oral, Q8H PRN, Emma Balbuena NP, 650 mg at 05/08/25 0847    [START ON 5/12/2025] apixaban tablet 5 mg, 5 mg, Oral, BID, Lindsey Ferreira MD    aspirin EC tablet 81 mg, 81 mg, Oral, Daily, Emma Balbuena, NP, 81 mg at 05/10/25 0930    dextrose 50% injection 12.5 g, 12.5 g, Intravenous, PRN, Emma Balbuena, NP    dextrose 50% injection 25 g, 25 g, Intravenous, PRN, Emma Balbuena, NP    epoetin jeronimo injection 5,000 Units, 5,000 Units, Subcutaneous, Q7 Days, Hany Gonsalez MD, 5,000 Units at 05/07/25 1602    famotidine tablet 20 mg, 20 mg,  Oral, Every other day, Emma Balbuena NP, 20 mg at 05/09/25 0913    febuxostat tablet 40 mg, 40 mg, Oral, Daily, Ann Lazo MD, 40 mg at 05/10/25 0930    ferric gluconate (Ferrlecit) 62.5 mg/5 mL injection 125 mg, 125 mg, Intravenous, Daily, Hany Gonsalez MD, 125 mg at 05/10/25 0930    glucagon (human recombinant) injection 1 mg, 1 mg, Intramuscular, PRN, Emma Balbuena, NP    glucose chewable tablet 16 g, 16 g, Oral, PRN, Emma Balbuena NP    glucose chewable tablet 24 g, 24 g, Oral, PRN, Emma Balbuena NP    insulin aspart U-100 pen 0-5 Units, 0-5 Units, Subcutaneous, QID (AC + HS) PRN, Emma Balbuena NP, 1 Units at 05/10/25 2109    insulin glargine U-100 (Lantus) pen 5 Units, 5 Units, Subcutaneous, BID, Emma Balbuena NP, 5 Units at 05/10/25 2109    metoprolol succinate (TOPROL-XL) 24 hr tablet 25 mg, 25 mg, Oral, Daily, Emma Balbuena NP, 25 mg at 05/10/25 0930    mycophenolate capsule 500 mg, 500 mg, Oral, BID, Hany Gonsalez MD, 500 mg at 05/10/25 2109    ondansetron injection 4 mg, 4 mg, Intravenous, Q12H PRN, Emma Balbuena NP    predniSONE tablet 5 mg, 5 mg, Oral, Daily, Hany Gonsalez MD, 5 mg at 05/10/25 0930    sodium chloride 0.9% flush 10 mL, 10 mL, Intravenous, PRN, Emma Balbuena NP    tacrolimus capsule 2 mg, 2 mg, Oral, BID, Ann Lazo MD, 2 mg at 05/10/25 5116

## 2025-05-11 NOTE — TRANSFER OF CARE
Anesthesia Transfer of Care Note    Patient: Mitch Whittaker    Procedure(s) Performed: * No procedures listed *    Patient location: GI    Anesthesia Type: MAC    Transport from OR: Transported from OR on room air with adequate spontaneous ventilation    Post pain: adequate analgesia    Post assessment: no apparent anesthetic complications and tolerated procedure well    Post vital signs: stable    Level of consciousness: sedated    Nausea/Vomiting: no nausea/vomiting    Complications: none    Transfer of care protocol was followed      Last vitals: Visit Vitals  BP (!) 152/72   Pulse 79   Temp 36.5 °C (97.7 °F) (Temporal)   Resp 16   Ht 6' (1.829 m)   Wt 93.2 kg (205 lb 7.5 oz)   SpO2 99%   BMI 27.87 kg/m²

## 2025-05-11 NOTE — ASSESSMENT & PLAN NOTE
ANGELITO is likely due to CHF Baseline creatinine is 1.9-2.8. Most recent creatinine and eGFR are listed below.  Recent Labs     05/09/25  0704 05/10/25  0612 05/11/25  0515   CREATININE 5.4* 4.6* 3.8*   EGFRNORACEVR 11* 13* 16*      Plan  - ANGELITO is worsening. Will continue current treatment  - Avoid nephrotoxins and renally dose meds for GFR listed above  - Monitor urine output, serial BMP, and adjust therapy as needed  Urinalysis with trace protein trace blood, urine protein creatinine ratio 0.49  Continues to improve

## 2025-05-11 NOTE — ASSESSMENT & PLAN NOTE
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 The patient's latest AMPAC (Activity Measure for Post Acute Care) Score is listed below.    AM-PAC Score - How much help does the patient need for each activity listed  Basic Mobility Total Score: 6  Turning over in bed (including adjusting bedclothes, sheets and blankets)?: Unable  Sitting down on and standing up from a chair with arms (e.g., wheelchair, bedside commode, etc.): Unable  Moving from lying on back to sitting on the side of the bed?: Unable  Moving to and from a bed to a chair (including a wheelchair)?: Unable  Need to walk in hospital room?: Unable  Climbing 3-5 steps with a railing?: Unable    Plan  - Progressive mobility protocol initated  - Fall precautions in place    Bedbound at baseline  Has been to skilled nursing facility in the past

## 2025-05-11 NOTE — ASSESSMENT & PLAN NOTE
Anemia is likely due to chronic blood loss, Iron deficiency, and chronic disease due to Chronic Kidney Disease. Most recent hemoglobin and hematocrit are listed below.  Recent Labs     05/09/25  0534 05/10/25  1213 05/11/25  0515   HGB 6.8* 9.5* 9.6*   HCT 24.2* 32.3* 33.4*     Plan  - Monitor serial CBC: Daily  - Transfuse PRBC if patient becomes hemodynamically unstable, symptomatic or H/H drops below 7/21.  - Patient has received 2 units of PRBCs on 5/7, 5/9  - Patient's anemia is currently improving  - egd pending for further workup of anemia requiring blood transfusions

## 2025-05-11 NOTE — PLAN OF CARE
Dr Lilly spoke to Patient and Wife and Daughter post procedure.  Report called to [facility] and Patient transported back per Bed.

## 2025-05-11 NOTE — ANESTHESIA POSTPROCEDURE EVALUATION
Anesthesia Post Evaluation    Patient: Mitch Whittaker    Procedure(s) Performed: * No procedures listed *    Final Anesthesia Type: MAC      Patient location during evaluation: PACU  Patient participation: Yes- Able to Participate  Level of consciousness: awake and alert  Post-procedure vital signs: reviewed and stable  Pain management: adequate  Airway patency: patent    PONV status at discharge: No PONV  Anesthetic complications: no      Cardiovascular status: blood pressure returned to baseline  Respiratory status: spontaneous ventilation  Hydration status: euvolemic  Follow-up not needed.              Vitals Value Taken Time   BP 99/59 05/11/25 10:12   Temp 36.7 °C (98 °F) 05/11/25 10:12   Pulse 83 05/11/25 10:12   Resp 19 05/11/25 10:12   SpO2 100 % 05/11/25 10:12         No case tracking events are documented in the log.      Pain/Lakeisha Score: Lakeisha Score: 8 (5/11/2025 10:12 AM)

## 2025-05-12 VITALS
HEIGHT: 72 IN | RESPIRATION RATE: 18 BRPM | BODY MASS INDEX: 27.83 KG/M2 | OXYGEN SATURATION: 99 % | DIASTOLIC BLOOD PRESSURE: 65 MMHG | SYSTOLIC BLOOD PRESSURE: 128 MMHG | WEIGHT: 205.5 LBS | HEART RATE: 85 BPM | TEMPERATURE: 98 F

## 2025-05-12 DIAGNOSIS — E11.649 UNCONTROLLED TYPE 2 DIABETES MELLITUS WITH HYPOGLYCEMIA WITHOUT COMA: ICD-10-CM

## 2025-05-12 LAB
ALBUMIN SERPL BCP-MCNC: 1.7 G/DL (ref 3.5–5.2)
ANION GAP (OHS): 11 MMOL/L (ref 8–16)
BUN SERPL-MCNC: 62 MG/DL (ref 8–23)
CALCIUM SERPL-MCNC: 9.5 MG/DL (ref 8.7–10.5)
CHLORIDE SERPL-SCNC: 106 MMOL/L (ref 95–110)
CO2 SERPL-SCNC: 24 MMOL/L (ref 23–29)
CREAT SERPL-MCNC: 3 MG/DL (ref 0.5–1.4)
ERYTHROCYTE [DISTWIDTH] IN BLOOD BY AUTOMATED COUNT: 17.2 % (ref 11.5–14.5)
GFR SERPLBLD CREATININE-BSD FMLA CKD-EPI: 22 ML/MIN/1.73/M2
GLUCOSE SERPL-MCNC: 132 MG/DL (ref 70–110)
HCT VFR BLD AUTO: 33.1 % (ref 40–54)
HGB BLD-MCNC: 9.7 GM/DL (ref 14–18)
KAPPA LC FREE SER-MCNC: 1.93 MG/L (ref 0.26–1.65)
KAPPA LC FREE/LAMBDA FREE SER: 10.8 MG/DL (ref 0.33–1.94)
LAMBDA LC FREE SERPL-MCNC: 5.59 MG/DL (ref 0.57–2.63)
MAGNESIUM SERPL-MCNC: 2 MG/DL (ref 1.6–2.6)
MCH RBC QN AUTO: 24 PG (ref 27–31)
MCHC RBC AUTO-ENTMCNC: 29.3 G/DL (ref 32–36)
MCV RBC AUTO: 82 FL (ref 82–98)
PATHOLOGIST INTERPRETATION - IFE SERUM (OHS): NORMAL
PATHOLOGIST INTERPRETATION - IFE URINE (OHS): ABNORMAL
PHOSPHATE SERPL-MCNC: 3 MG/DL (ref 2.7–4.5)
PHOSPHATE SERPL-MCNC: 3.5 MG/DL (ref 2.7–4.5)
PLATELET # BLD AUTO: 299 K/UL (ref 150–450)
PMV BLD AUTO: 9.5 FL (ref 9.2–12.9)
POCT GLUCOSE: 112 MG/DL (ref 70–110)
POCT GLUCOSE: 177 MG/DL (ref 70–110)
POCT GLUCOSE: 240 MG/DL (ref 70–110)
POTASSIUM SERPL-SCNC: 3.8 MMOL/L (ref 3.5–5.1)
PROT UR-MCNC: 17 MG/DL
RBC # BLD AUTO: 4.05 M/UL (ref 4.6–6.2)
SODIUM SERPL-SCNC: 141 MMOL/L (ref 136–145)
WBC # BLD AUTO: 3.51 K/UL (ref 3.9–12.7)

## 2025-05-12 PROCEDURE — 63600175 PHARM REV CODE 636 W HCPCS: Performed by: INTERNAL MEDICINE

## 2025-05-12 PROCEDURE — 83735 ASSAY OF MAGNESIUM: CPT | Performed by: INTERNAL MEDICINE

## 2025-05-12 PROCEDURE — 85027 COMPLETE CBC AUTOMATED: CPT | Performed by: INTERNAL MEDICINE

## 2025-05-12 PROCEDURE — 25000003 PHARM REV CODE 250: Performed by: INTERNAL MEDICINE

## 2025-05-12 PROCEDURE — 80197 ASSAY OF TACROLIMUS: CPT | Performed by: FAMILY MEDICINE

## 2025-05-12 PROCEDURE — 36415 COLL VENOUS BLD VENIPUNCTURE: CPT | Performed by: INTERNAL MEDICINE

## 2025-05-12 PROCEDURE — 99499 UNLISTED E&M SERVICE: CPT | Mod: ,,, | Performed by: INTERNAL MEDICINE

## 2025-05-12 PROCEDURE — 84100 ASSAY OF PHOSPHORUS: CPT | Performed by: INTERNAL MEDICINE

## 2025-05-12 RX ORDER — INSULIN GLARGINE 100 [IU]/ML
15 INJECTION, SOLUTION SUBCUTANEOUS 2 TIMES DAILY
Qty: 9 ML | Refills: 0 | OUTPATIENT
Start: 2025-05-12 | End: 2025-06-11

## 2025-05-12 RX ORDER — FEBUXOSTAT 40 MG/1
40 TABLET, FILM COATED ORAL DAILY
Qty: 30 TABLET | Refills: 1 | Status: SHIPPED | OUTPATIENT
Start: 2025-05-13

## 2025-05-12 RX ADMIN — PREDNISONE 5 MG: 5 TABLET ORAL at 09:05

## 2025-05-12 RX ADMIN — INSULIN GLARGINE 5 UNITS: 100 INJECTION, SOLUTION SUBCUTANEOUS at 10:05

## 2025-05-12 RX ADMIN — INSULIN ASPART 1 UNITS: 100 INJECTION, SOLUTION INTRAVENOUS; SUBCUTANEOUS at 09:05

## 2025-05-12 RX ADMIN — MYCOPHENOLATE MOFETIL 500 MG: 250 CAPSULE ORAL at 09:05

## 2025-05-12 RX ADMIN — ASPIRIN 81 MG: 81 TABLET, COATED ORAL at 09:05

## 2025-05-12 RX ADMIN — INSULIN ASPART 2 UNITS: 100 INJECTION, SOLUTION INTRAVENOUS; SUBCUTANEOUS at 06:05

## 2025-05-12 RX ADMIN — SODIUM FERRIC GLUCONATE COMPLEX 125 MG: 12.5 INJECTION INTRAVENOUS at 08:05

## 2025-05-12 RX ADMIN — METOPROLOL SUCCINATE 25 MG: 25 TABLET, EXTENDED RELEASE ORAL at 09:05

## 2025-05-12 RX ADMIN — INSULIN GLARGINE 5 UNITS: 100 INJECTION, SOLUTION SUBCUTANEOUS at 09:05

## 2025-05-12 RX ADMIN — TACROLIMUS 2 MG: 1 CAPSULE ORAL at 06:05

## 2025-05-12 RX ADMIN — FEBUXOSTAT 40 MG: 40 TABLET, FILM COATED ORAL at 09:05

## 2025-05-12 RX ADMIN — TACROLIMUS 2 MG: 1 CAPSULE ORAL at 09:05

## 2025-05-12 NOTE — TELEPHONE ENCOUNTER
No care due was identified.  [facility] Embedded Care Due Messages. Reference number: [id number].   5/12/2025 5:09:30 AM CDT

## 2025-05-12 NOTE — PLAN OF CARE
[facility] - Telemetry (Hospital)  Discharge Final Note    Primary Care Provider: Valery Caal MD    Expected Discharge Date: 5/12/2025    Final Discharge Note (most recent)       Final Note - 05/12/25 1635          Final Note    Assessment Type Final Discharge Note     Anticipated Discharge Disposition Home-Health Care [facility]     Hospital Resources/Appts/Education Provided Post-Acute resouces added to AVS;Appointments scheduled and added to AVS        Post-Acute Status    Discharge Delays None known at this time                   DC home today; resume [facility]  at [place]. AVS updated.   Hospital f/u on AVS: Hospital Follow Up with Rosio Mittal PA-C  Thursday May 15, 2025 1:20 PM    Important Message from Medicare             After-discharge care                Home Medical Care       *[facility]   Service: Home Health Services    [address]  [facility] [address]   Phone: [phone]

## 2025-05-12 NOTE — SUBJECTIVE & OBJECTIVE
Subjective:     Interval History: We were planning on doing a colonoscopy today but patient refused to drink prep.     We can plan for outpatient work up if he changes his mind.       Objective:     Vital Signs (Most Recent):  Temp: 97.3 °F (36.3 °C) (05/12/25 0420)  Pulse: 84 (05/12/25 1328)  Resp: 18 (05/12/25 1328)  BP: 119/62 (05/12/25 1328)  SpO2: 99 % (05/12/25 1328) Vital Signs (24h Range):  Temp:  [97.3 °F (36.3 °C)-97.7 °F (36.5 °C)] 97.3 °F (36.3 °C)  Pulse:  [] 84  Resp:  [18] 18  SpO2:  [98 %-99 %] 99 %  BP: (115-130)/(59-71) 119/62     Weight: 93.2 kg (205 lb 7.5 oz) (05/11/25 0502)  Body mass index is 27.87 kg/m².    No intake or output data in the 24 hours ending 05/12/25 1445    Lines/Drains/Airways       Peripheral Intravenous Line  Duration                  Hemodialysis AV Fistula Right upper arm -- days         Peripheral IV - Single Lumen 05/11/25 2211 22 G Posterior;Right Forearm <1 day                    Significant Labs:  Lab Results   Component Value Date    WBC 3.51 (L) 05/12/2025    HGB 9.7 (L) 05/12/2025    HCT 33.1 (L) 05/12/2025    MCV 82 05/12/2025     05/12/2025

## 2025-05-12 NOTE — PROGRESS NOTES
[facility])  Gastroenterology  Progress Note    Patient Name: Mitch Whittaker  MRN: [id number]  Admission Date: 5/6/2025  Hospital Length of Stay: 6 days  Code Status: Full Code   Attending Provider: Lindsey Ferreira MD  Consulting Provider: Jerel Lilly MD  Primary Care Physician: Valery Caal MD  Principal Problem: CKD (chronic kidney disease) stage 4, GFR 15-29 ml/min        Subjective:     Interval History: We were planning on doing a colonoscopy today but patient refused to drink prep.     We can plan for outpatient work up if he changes his mind.       Objective:     Vital Signs (Most Recent):  Temp: 97.3 °F (36.3 °C) (05/12/25 0420)  Pulse: 84 (05/12/25 1328)  Resp: 18 (05/12/25 1328)  BP: 119/62 (05/12/25 1328)  SpO2: 99 % (05/12/25 1328) Vital Signs (24h Range):  Temp:  [97.3 °F (36.3 °C)-97.7 °F (36.5 °C)] 97.3 °F (36.3 °C)  Pulse:  [] 84  Resp:  [18] 18  SpO2:  [98 %-99 %] 99 %  BP: (115-130)/(59-71) 119/62     Weight: 93.2 kg (205 lb 7.5 oz) (05/11/25 0502)  Body mass index is 27.87 kg/m².    No intake or output data in the 24 hours ending 05/12/25 1445    Lines/Drains/Airways       Peripheral Intravenous Line  Duration                  Hemodialysis AV Fistula Right upper arm -- days         Peripheral IV - Single Lumen 05/11/25 2211 22 G Posterior;Right Forearm <1 day                    Significant Labs:  Lab Results   Component Value Date    WBC 3.51 (L) 05/12/2025    HGB 9.7 (L) 05/12/2025    HCT 33.1 (L) 05/12/2025    MCV 82 05/12/2025     05/12/2025       Assessment/Plan:     Oncology  Iron deficiency anemia due to chronic blood loss  Differential Diagnosis:  Prograf toxicity contributing to anemia via hematuria and leukopenia.  Chronic kidney disease (CKD)-related anemia due to impaired erythropoiesis.  Gastrointestinal bleeding (occult or upper GI source)--considering history of black stools. Will proceed with EGD/Colon in the morning.   Drug-induced  anemia--Eliquis, though recently discontinued, may have contributed.  Iron-deficiency anemia--prior EGD in March 2024 was normal.  Malignancy-related anemia--less likely but should be ruled out.  CMV reactivation--unlikely given previously undetectable levels.    Plan & Recommendations:  Patient refused to drink bowel prep.   Plan for outpatient procedure if he changes his mind.       Thank you for your consult. I will sign off. Please contact us if you have any additional questions.    Jerel Lilly MD  Gastroenterology  [facility] - Telemetry ([facility])

## 2025-05-12 NOTE — PHYSICIAN QUERY
Please clarify conflicting stage of chronic kidney disease (CKD).    Other (please specify): chronic kidney disease 3/4

## 2025-05-12 NOTE — NURSING
Dr. Lilly notified via secure chat that the pt did not complete the Golytely prep.    Sherri Koo RN

## 2025-05-12 NOTE — ASSESSMENT & PLAN NOTE
Differential Diagnosis:  Prograf toxicity contributing to anemia via hematuria and leukopenia.  Chronic kidney disease (CKD)-related anemia due to impaired erythropoiesis.  Gastrointestinal bleeding (occult or upper GI source)--considering history of black stools. Will proceed with EGD/Colon in the morning.   Drug-induced anemia--Eliquis, though recently discontinued, may have contributed.  Iron-deficiency anemia--prior EGD in March 2024 was normal.  Malignancy-related anemia--less likely but should be ruled out.  CMV reactivation--unlikely given previously undetectable levels.    Plan & Recommendations:  Patient refused to drink bowel prep.   Plan for outpatient procedure if he changes his mind.

## 2025-05-12 NOTE — PLAN OF CARE
05/12/25 1428   Rounds   Attendance Provider;Charge nurse;;Physical therapist   Discharge Plan A Home with family;Home Health   Why the patient remains in the hospital Requires continued medical care   Transition of Care Barriers Mobility;Bariatric     Pt remains hospitalized d/t continued medical care.   Nephrology and GI consulted and following  PT/OT recs received; pt at PLOF and no therapy services indicated.   Pt current with [facility] ; plan to resume care at [facility]  SW to remain available as needed

## 2025-05-13 ENCOUNTER — RESULTS FOLLOW-UP (OUTPATIENT)
Dept: GASTROENTEROLOGY | Facility: HOSPITAL | Age: 70
End: 2025-05-13

## 2025-05-13 LAB
ESTROGEN SERPL-MCNC: NORMAL PG/ML
INSULIN SERPL-ACNC: NORMAL U[IU]/ML
LAB AP CLINICAL INFORMATION: NORMAL
LAB AP GROSS DESCRIPTION: NORMAL
LAB AP PERFORMING LOCATION(S): NORMAL
LAB AP REPORT FOOTNOTES: NORMAL
PATHOLOGIST REVIEW - CBC/DIFF (OHS): NORMAL
TACROLIMUS BLD-MCNC: 5 NG/ML (ref 5–15)

## 2025-05-13 NOTE — ASSESSMENT & PLAN NOTE
Patients blood pressure range in the last 24 hours was: BP  Min: 99/59  Max: 154/55.The patient's inpatient anti-hypertensive regimen is listed below:  Current Antihypertensives       Plan  - BP is controlled, no changes needed to their regimen

## 2025-05-13 NOTE — ASSESSMENT & PLAN NOTE
Anemia is likely due to chronic blood loss, Iron deficiency, and chronic disease due to Chronic Kidney Disease. Most recent hemoglobin and hematocrit are listed below.  Recent Labs     05/10/25  1213 05/11/25  0515 05/12/25  0903   HGB 9.5* 9.6* 9.7*   HCT 32.3* 33.4* 33.1*     Plan  - Monitor serial CBC: Daily  - Transfuse PRBC if patient becomes hemodynamically unstable, symptomatic or H/H drops below 7/21.  - Patient has received 2 units of PRBCs on 5/7, 5/9  - Patient's anemia is currently improving  - egd pending for further workup of anemia requiring blood transfusions

## 2025-05-13 NOTE — PROGRESS NOTES
Office follow up in several weeks. Not urgent.     05/13/2025    Dear Mitch Whittaker,    Your Biopsies obtained during your recent procedure are essentially benign/normal. No further action is needed at this point.    Thanks for using [facility], Aldo J. Russo, M.D.

## 2025-05-13 NOTE — ASSESSMENT & PLAN NOTE
Anemia is likely due to chronic blood loss, Iron deficiency, and chronic disease due to ESRD. Most recent hemoglobin and hematocrit are listed below.  Recent Labs     05/10/25  1213 05/11/25  0515 05/12/25  0903   HGB 9.5* 9.6* 9.7*   HCT 32.3* 33.4* 33.1*     Plan  - Monitor serial CBC: Daily  - Transfuse PRBC if patient becomes hemodynamically unstable, symptomatic or H/H drops below 7/21.  - Patient has received 2 units of PRBCs on 5/7, 5/9  - Patient's anemia is currently pending results  - gastroenterology consulted   Review of prior workup in past includes previous egd in 2024, negative for hpylori, negative for celiac and negative malignancy

## 2025-05-13 NOTE — DISCHARGE SUMMARY
"[facility] - Telemetry ([facility] Medicine  Discharge Summary      Patient Name: Mitch Whittaker  MRN: [id number]  BRYANT: [id number]  Patient Class: IP- Inpatient  Admission Date: 5/6/2025  Hospital Length of Stay: 6 days  Discharge Date and Time: 05/13/2025 8:36 AM  Attending Physician: No att. providers found   Discharging Provider: Darya Maravilla NP  Primary Care Provider: Valery Caal MD    Primary Care Team: Networked reference to record PCT     HPI:   The patient is a 70yo male with CAD, Combined CHF EF 35 - 40%, s/p Renal transplant 2015, Chronic immunosuppression, CKD3, DM, Anemia, HLD, HTN, Hx DVT- on Eliquis, Gout, Chronic venous stasis and recurrent skin infections, and Morbid obesity who presented to the ED with abdominal pain. Pt reports lower back pain radiating to his abdomen describes as an aching pain rated 8/10 that has occurred on/off for the last 3 days. Pt denies fever, chills, chest pain, SOB, cough, N/V, Diarrhea, or constipation. He reports a normal BM yesterday. Pt also complains of worsening weakness and pain to arms and legs which he attributs to gout. He reports pain is worse to his hands, left knee, and ankles. He reports his left knee has been swollen "for awhile". He reports chronic swelling to BLE. Pt is bed bound at home and lives with his wife and daughters who are his caretakers.   Pt reports abdominal pain has resolved since arrival to ED     In the ED, Afebrile, VSS, oxygenation normal. Labs revealed mild leukopenia with WBC 3.4, Hgb 7.3, BUN 79, sCr 5.7 (baseline 1.9-2.8), Albumin 1.5, , Trop 0.067>0.062. EKG showed sinus tachycardia -rate 108, PACs, nonspecific intraventricular block   CT chest/abd/pelvis showed Small left pleural effusion, Nonspecific distention of the gallbladder, No gallbladder wall thickening or bile duct dilatation, Fatty liver, Native kidneys are small and atrophic, No hydronephrosis, No ureteral stones, Right renal " transplant.    Ed provider discussed care with nephrologist, Dr. Gonsalez who recommended IV Lasix drip plus metolazone as needed 5-10 mg per dose     * No surgery found *      Hospital Course:   Patient is a 69-year-old male with a history of coronary artery disease, biventricular CHF with an EF of 35-40%, status post living related donor kidney transplant 2015 on chronic immunosuppression, ANGELITO on CKD 3, diabetes mellitus, anemia, hypertension, history of DVT on Eliquis, gout, chronic venous stasis, morbid obesity.  Patient presented to the emergency department complaining of back pain radiating to his abdomen 8/10.  He reports it is has been occurring for approximately 3 days prior to admission.  Health who complains of generalized body ache worse pain in his legs and knees.  He states this has been going on for quite some time.  Emergency department labs revealed a white count of 3.4 hemoglobin 7.3 creatinine of 5.7 which is up from about 3.  Albumin is 1.5.  CT scan chest abdomen and pelvis revealed a small left pleural effusion otherwise none specific.    Patient was seen by his nephrologist Dr. Gonsalez who initiated Lasix drip with metolazone.  After review by rounding nephrologist it was felt that he was maybe slightly over diuresed.  Diuretics were held and patient was started on normal saline at 100 cc an hour for 2 L.  Prograf level noted to be 10 and Prograf decreased to 2 mg b.i.d. recheck level in a.m.  Markedly elevated uric acid, patient was initiated on Uloric.  Seen and examined by Physical therapy/Occupational therapy who felt that patient was at his baseline status and declined further intervention.     As of 5/12/25 patient was scheduled to have colonoscopy but did not complete bowel prep. He did not have interest in completing bowel prep this admission, so GI will offer OP if he is agreeable. H/H has remained stable. He was asked to hold calcitrol until instructed by Nephrology. He will also  "continue ketconazole. He was asked to follow up with PCP to repeat tacrolimus level. For gout, he will continue Uloric instead of Allopurinol. He will hold STATINs while taking Uloric. Patient will discharge with HH and family support. Patient seen and examined on date of discharge and deemed suitable.      Goals of Care Treatment Preferences:  Code Status: Full Code      SDOH Screening:  The patient was screened for utility difficulties, food insecurity, transport difficulties, housing insecurity, and interpersonal safety and there were no concerns identified this admission.     Consults:   Consults (From admission, onward)          Status Ordering Provider     Inpatient consult to Gastroenterology  Once        Provider:  Jerel Lilly MD    Completed NAGA HEATH     Inpatient consult to Nephrology  Once        Provider:  Noel Jimenes MD    Completed SIDNEY STAHL     Inpatient consult to Registered Dietitian/Nutritionist  Once        Provider:  (Not yet assigned)    Completed YARI REILLY            Assessment & Plan  Acute on chronic combined systolic and diastolic congestive heart failure  Patient has Combined Systolic and Diastolic heart failure that is Acute on chronic. On presentation their CHF was decompensated. Evidence of decompensated CHF on presentation includes: edema, elevated JVD, and worsening renal function. The etiology of their decompensation is likely dietary indiscretion and increased fluid intake. Most recent BNP and echo results are listed below.  No results for input(s): "BNP" in the last 72 hours.    Latest ECHO  Results for orders placed during the hospital encounter of 03/13/25    Echo Saline Bubble? No    Interpretation Summary    Left Ventricle: The left ventricle is normal in size. Mildly increased ventricular mass. Mildly increased wall thickness. There is mild concentric hypertrophy. Normal wall motion. Septal motion is consistent with bundle branch block. There " is moderately reduced systolic function with a visually estimated ejection fraction of 35 - 40%. Grade I diastolic dysfunction.    Right Ventricle: The right ventricle is normal in size. Wall thickness is normal. Systolic function is normal.    Left Atrium: Mildly dilated    Aorta: Aortic annulus is mildly dilated measuring 4.01 cm. Ascending aorta is normal measuring 3.69 cm.    Pulmonary Artery: The estimated pulmonary artery systolic pressure is 24 mmHg.    IVC/SVC: Normal venous pressure at 3 mmHg.    Current Heart Failure Medications       Plan  - Monitor strict I&Os and daily weights.    - Place on telemetry  - Low sodium diet  - Place on fluid restriction of 1.5 L.   - Cardiology has not been consulted  - The patient's volume status is worsening as indicated by edema and elevated JVD. Will continue current treatment     Resolved       ANGELITO on CKD 4  ANGELITO is likely due to CHF Baseline creatinine is 1.9-2.8. Most recent creatinine and eGFR are listed below.  Recent Labs     05/11/25  0515 05/12/25  0903   CREATININE 3.8* 3.0*   EGFRNORACEVR 16* 22*      Plan  - ANGELITO is worsening. Will continue current treatment  - Avoid nephrotoxins and renally dose meds for GFR listed above  - Monitor urine output, serial BMP, and adjust therapy as needed  Urinalysis with trace protein trace blood, urine protein creatinine ratio 0.49  Continues to improve  Obstructive sleep apnea  CPAP hs    Coronary artery disease of native artery of native heart with stable angina pectoris  Patient with known non obstructive CAD , which is controlled Will continue BB, ASA, and Statin and monitor for S/Sx of angina/ACS. Continue to monitor on telemetry.   Hb 6.8 warranting another blood transfusion  Status post transfusion  Hb/hct responded   Living-related donor kidney transplant (daughter) - 6/12/15  Nephrology following   Continue Prograf, Prednisone, Cellcept   Prograf level 10 - decreased to 2mg/2mg  Awaiting tacrolimus level in  am    Secondary hyperparathyroidism of renal origin  Stable   Cont Calcitriol     Type II diabetes mellitus with renal manifestations  Patient's FSGs are controlled on current medication regimen.  Last A1c reviewed-   Lab Results   Component Value Date    HGBA1C 6.3 (H) 05/07/2025     Most recent fingerstick glucose reviewed-   Recent Labs   Lab 05/12/25  1201 05/12/25  2110   POCTGLUCOSE 112* 240*     Current correctional scale  Low  Maintain anti-hyperglycemic dose as follows-   Antihyperglycemics (From admission, onward)      None          Hold Oral hypoglycemics while patient is in the hospital.    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 The patient's latest AMPAC (Activity Measure for Post Acute Care) Score is listed below.    AM-PAC Score - How much help does the patient need for each activity listed  Basic Mobility Total Score: 6  Turning over in bed (including adjusting bedclothes, sheets and blankets)?: Unable  Sitting down on and standing up from a chair with arms (e.g., wheelchair, bedside commode, etc.): Unable  Moving from lying on back to sitting on the side of the bed?: Unable  Moving to and from a bed to a chair (including a wheelchair)?: Unable  Need to walk in hospital room?: Unable  Climbing 3-5 steps with a railing?: Unable    Plan  - Progressive mobility protocol initated  - Fall precautions in place    Bedbound at baseline  Has been to skilled nursing facility in the past      Gout  Elevated  uric acid, cont Allopurinol   On uloric     History of DVT (deep vein thrombosis)  Stable   On Eliquis   With hb/hct downtrending with concerns for GI bleed   Hold eliquis  Immunosuppression  Nephrology recommended continuing pt's home medications Prograf, Cellcept, and Prednisone   Tacrolimus level pending    Arthralgia  Uric acid 11.5  Markedly elevated inflammatory markers  On Uloric    ABL Anemia plus anemia of CKD 4  Anemia is likely due to chronic blood loss, Iron deficiency, and chronic disease due to ESRD. Most recent hemoglobin and hematocrit are listed below.  Recent Labs     05/10/25  1213 05/11/25  0515 05/12/25  0903   HGB 9.5* 9.6* 9.7*   HCT 32.3* 33.4* 33.1*     Plan  - Monitor serial CBC: Daily  - Transfuse PRBC if patient becomes hemodynamically unstable, symptomatic or H/H drops below 7/21.  - Patient has received 2 units of PRBCs on 5/7, 5/9  - Patient's anemia is currently pending results  - gastroenterology consulted   Review of prior workup in past includes previous egd in 2024, negative for hpylori, negative for celiac and negative malignancy    History of colon polyps  Review of prior colonoscopies include 2018 with polypectomy, 2022 with polypectomy with adenoma with focal cryptitis, and 2023 with no inflammation, no malignancy on polyp biopsy    Iron deficiency anemia due to chronic blood loss  Anemia is likely due to chronic blood loss, Iron deficiency, and chronic disease due to Chronic Kidney Disease. Most recent hemoglobin and hematocrit are listed below.  Recent Labs     05/10/25  1213 05/11/25  0515 05/12/25  0903   HGB 9.5* 9.6* 9.7*   HCT 32.3* 33.4* 33.1*     Plan  - Monitor serial CBC: Daily  - Transfuse PRBC if patient becomes hemodynamically unstable, symptomatic or H/H drops below 7/21.  - Patient has received 2 units of PRBCs on 5/7, 5/9  - Patient's anemia is currently improving  - egd pending for further workup of anemia requiring blood transfusions    Final Active  Diagnoses:    Diagnosis Date Noted POA    PRINCIPAL PROBLEM:  ANGELITO on CKD 4 [N18.4] 06/24/2024 Yes    Immunosuppression [D84.9] 05/06/2025 Yes    Arthralgia [M25.50] 05/06/2025 Yes    History of DVT (deep vein thrombosis) [Z86.718] 04/02/2025 Not Applicable     Chronic    Gout [M10.9] 03/15/2025 Yes    Debility [R53.81] 01/18/2025 Yes    Iron deficiency anemia due to chronic blood loss [D50.0] 03/26/2024 Yes    Acute on chronic combined systolic and diastolic congestive heart failure [I50.43] 03/15/2019 Yes    History of colon polyps [Z86.0100]  Not Applicable    Hypertension associated with stage 3 chronic kidney disease due to type 2 diabetes mellitus [E11.22, I12.9, N18.30]  Yes    Type II diabetes mellitus with renal manifestations [E11.29]  Yes    Secondary hyperparathyroidism of renal origin [N25.81] 06/23/2015 Yes    Living-related donor kidney transplant (daughter) - 6/12/15 [Z94.0]  Not Applicable     Chronic    Coronary artery disease of native artery of native heart with stable angina pectoris [I25.118]  Yes     Chronic    Obstructive sleep apnea [G47.33] 06/13/2013 Yes     Chronic    ABL Anemia plus anemia of CKD 4 [N18.9, D63.1]  Yes      Problems Resolved During this Admission:       Discharged Condition: stable    [place] [facility] [place]:   Contact information for after-discharge care       Home Medical Care       [facility] .    Service: Home Health Services  Contact information:  [address] [facility] [address] [facility] [address]  [phone]                                 Patient Instructions:      Call MD for:  temperature >100.4     Call MD for:  persistent nausea and vomiting or diarrhea     Call MD for:  severe uncontrolled pain     Call MD for:  redness, tenderness, or signs of infection (pain, swelling, redness, odor or green/yellow discharge around incision site)     Call MD for:  difficulty breathing or increased cough     Call  MD for:  severe persistent headache     Call MD for:  worsening rash     Call MD for:  persistent dizziness, light-headedness, or visual disturbances     Call MD for:  increased confusion or weakness     SUBSEQUENT HOME HEALTH ORDERS     Order Specific Question Answer Comments   What Home Health Agency is the patient currently using? [facility]        Significant Diagnostic Studies: Labs: CMP   Recent Labs   Lab 05/12/25 0903      K 3.8      CO2 24   *   BUN 62*   CREATININE 3.0*   CALCIUM 9.5   ALBUMIN 1.7*   ANIONGAP 11    and CBC   Recent Labs   Lab 05/12/25 0903   WBC 3.51*   HGB 9.7*   HCT 33.1*          Pending Diagnostic Studies:       Procedure Component Value Units Date/Time    Peripheral Smear Review for Hemolysis or Low Platelets [[id number]] Collected: 05/09/25 0534    Order Status: Sent Lab Status: In process Updated: 05/09/25 1728    Specimen: Blood     Tacrolimus level [[id number]] Collected: 05/12/25 0903    Order Status: Sent Lab Status: In process Updated: 05/12/25 0958    Specimen: Blood            Medications:  Reconciled Home Medications:      Medication List        PAUSE taking these medications      calcitRIOL 0.25 MCG Cap  Wait to take this until your doctor or other care provider tells you to start again.  Commonly known as: ROCALTROL  Take 1 capsule (0.25 mcg total) by mouth once daily.     rosuvastatin 40 MG Tab  Wait to take this until your doctor or other care provider tells you to start again.  Commonly known as: CRESTOR  Take 1 tablet (40 mg total) by mouth once daily.            START taking these medications      febuxostat 40 mg Tab  Commonly known as: ULORIC  Take 1 tablet (40 mg total) by mouth once daily.            CONTINUE taking these medications      aspirin 81 MG EC tablet  Commonly known as: ECOTRIN  Take 1 tablet by mouth Daily.     DEXCOM G7 SENSOR [place]  Generic drug: blood-glucose sensor  Use as directed for continuous glucose  "monitoring; Change every 10 days.     ELIQUIS 5 mg Tab  Generic drug: apixaban  Take 1 tablet (5 mg total) by mouth 2 (two) times daily.     famotidine 20 MG tablet  Commonly known as: PEPCID  TAKE ONE TABLET BY MOUTH EVERY EVENING     FeroSuL 325 mg (65 mg iron) Tab tablet  Generic drug: ferrous sulfate  Take 1 tablet (325 mg total) by mouth daily with breakfast.     fish oil-omega-3 fatty acids 300-1,000 mg capsule  Take 1 g by mouth once daily.     furosemide 40 MG tablet  Commonly known as: LASIX  Take 1 tablet (40 mg total) by mouth once daily.     * insulin aspart U-100 100 unit/mL (3 mL) Inpn pen  Commonly known as: NovoLOG  Inject 0-10 Units into the skin before meals and at bedtime as needed (Hyperglycemia). **MODERATE CORRECTION DOSE**  Blood Glucose  mg/dL                  Pre-meal                2200  151-200                2 units                    1 unit  201-250                4 units                    2 units  251-300                6 units                    3 units  301-350                8 units                    4 units  >350                     10 units                  5 units  Administer subcutaneously if needed at times designated by monitoring  schedule.  DO NOT HOLD correction dose insulin for patients who are  NPO.    "HIGH ALERT MEDICATION" - Administer with meals or TF/TPN.     * insulin aspart U-100 100 unit/mL (3 mL) Inpn pen  Commonly known as: NovoLOG Flexpen U-100 Insulin  Inject 25 Units into the skin 3 (three) times daily with meals.     ketoconazole 200 mg Tab  Commonly known as: NIZORAL  Take ½ tablet (100 mg total) by mouth once daily.     LANTUS SOLOSTAR U-100 INSULIN 100 unit/mL (3 mL) Inpn pen  Generic drug: insulin glargine U-100 (Lantus)  Inject 15 Units into the skin 2 (two) times daily.     metoprolol succinate 25 MG 24 hr tablet  Commonly known as: TOPROL-XL  Take 1 tablet (25 mg total) by mouth once daily.     multivitamin tablet  Commonly known as: THERAGRAN  Take " 1 tablet by mouth once daily.     mycophenolate 250 mg Cap  Commonly known as: CELLCEPT  Take 2 capsules (500 mg total) by mouth 2 (two) times daily.     OZEMPIC 2 mg/dose (8 mg/3 mL) Pnij  Generic drug: semaglutide  Inject 2 mg into the skin every 7 days.     potassium chloride SA 20 MEQ tablet  Commonly known as: K-DUR,KLOR-CON  Take 1 tablet (20 mEq total) by mouth 2 (two) times daily.     predniSONE 5 MG tablet  Commonly known as: DELTASONE  Take 1 tablet (5 mg total) by mouth once daily.     sodium bicarbonate 650 MG tablet  Take 1 tablet (650 mg total) by mouth once daily.     tacrolimus 1 MG Cap  Commonly known as: PROGRAF  Take 3 capsules (3 mg total) by mouth every morning AND 3 capsules (3 mg total) every evening. Until followup with nephrology.     triamcinolone acetonide 0.1% 0.1 % ointment  Commonly known as: KENALOG  Apply topically 2 (two) times daily. Use on bilateral lower legs.           * This list has 2 medication(s) that are the same as other medications prescribed for you. Read the directions carefully, and ask your doctor or other care provider to review them with you.                STOP taking these medications      allopurinoL 100 MG tablet  Commonly known as: ZYLOPRIM            ASK your doctor about these medications      * albuterol 2.5 mg /3 mL (0.083 %) nebulizer solution  Commonly known as: PROVENTIL  Take 3 mLs (2.5 mg total) by nebulization every 4 to 6 hours as needed for Wheezing or Shortness of Breath.     * albuterol 90 mcg/actuation inhaler  Commonly known as: PROVENTIL/VENTOLIN HFA  Inhale 2 puffs into the lungs every 4 (four) hours as needed for Wheezing or Shortness of Breath.     HYDROcodone-acetaminophen 5-325 mg per tablet  Commonly known as: NORCO  Take 1 tablet by mouth every 6 (six) hours as needed for Pain.           * This list has 2 medication(s) that are the same as other medications prescribed for you. Read the directions carefully, and ask your doctor or other  care provider to review them with you.                  Indwelling Lines/Drains at time of discharge:   Lines/Drains/Airways       Drain  Duration                  Hemodialysis AV Fistula Right upper arm -- days                    Time spent on the discharge of patient: >35 minutes      Darya Maravilla NP  Department of Hospital Medicine  '[place] - Telemetry ([facility])

## 2025-05-13 NOTE — ASSESSMENT & PLAN NOTE
Patient's FSGs are controlled on current medication regimen.  Last A1c reviewed-   Lab Results   Component Value Date    HGBA1C 6.3 (H) 05/07/2025     Most recent fingerstick glucose reviewed-   Recent Labs   Lab 05/12/25  1201 05/12/25  2110   POCTGLUCOSE 112* 240*     Current correctional scale  Low  Maintain anti-hyperglycemic dose as follows-   Antihyperglycemics (From admission, onward)      None          Hold Oral hypoglycemics while patient is in the hospital.    Cont Lantus- titrate   Hyperglycemia, on prednisone  Adjust insulin as needed

## 2025-05-13 NOTE — ASSESSMENT & PLAN NOTE
"Patient has Combined Systolic and Diastolic heart failure that is Acute on chronic. On presentation their CHF was decompensated. Evidence of decompensated CHF on presentation includes: edema, elevated JVD, and worsening renal function. The etiology of their decompensation is likely dietary indiscretion and increased fluid intake. Most recent BNP and echo results are listed below.  No results for input(s): "BNP" in the last 72 hours.    Latest ECHO  Results for orders placed during the hospital encounter of 03/13/25    Echo Saline Bubble? No    Interpretation Summary    Left Ventricle: The left ventricle is normal in size. Mildly increased ventricular mass. Mildly increased wall thickness. There is mild concentric hypertrophy. Normal wall motion. Septal motion is consistent with bundle branch block. There is moderately reduced systolic function with a visually estimated ejection fraction of 35 - 40%. Grade I diastolic dysfunction.    Right Ventricle: The right ventricle is normal in size. Wall thickness is normal. Systolic function is normal.    Left Atrium: Mildly dilated    Aorta: Aortic annulus is mildly dilated measuring 4.01 cm. Ascending aorta is normal measuring 3.69 cm.    Pulmonary Artery: The estimated pulmonary artery systolic pressure is 24 mmHg.    IVC/SVC: Normal venous pressure at 3 mmHg.    Current Heart Failure Medications       Plan  - Monitor strict I&Os and daily weights.    - Place on telemetry  - Low sodium diet  - Place on fluid restriction of 1.5 L.   - Cardiology has not been consulted  - The patient's volume status is worsening as indicated by edema and elevated JVD. Will continue current treatment     Resolved       "

## 2025-05-13 NOTE — ASSESSMENT & PLAN NOTE
ANGELITO is likely due to CHF Baseline creatinine is 1.9-2.8. Most recent creatinine and eGFR are listed below.  Recent Labs     05/11/25  0515 05/12/25  0903   CREATININE 3.8* 3.0*   EGFRNORACEVR 16* 22*      Plan  - ANGELITO is worsening. Will continue current treatment  - Avoid nephrotoxins and renally dose meds for GFR listed above  - Monitor urine output, serial BMP, and adjust therapy as needed  Urinalysis with trace protein trace blood, urine protein creatinine ratio 0.49  Continues to improve

## 2025-05-14 ENCOUNTER — PATIENT MESSAGE (OUTPATIENT)
Dept: ADMINISTRATIVE | Facility: CLINIC | Age: 70
End: 2025-05-14
Payer: COMMERCIAL

## 2025-05-14 ENCOUNTER — PATIENT OUTREACH (OUTPATIENT)
Dept: ADMINISTRATIVE | Facility: CLINIC | Age: 70
End: 2025-05-14
Payer: COMMERCIAL

## 2025-05-14 ENCOUNTER — TELEPHONE (OUTPATIENT)
Dept: CARDIOLOGY | Facility: CLINIC | Age: 70
End: 2025-05-14
Payer: COMMERCIAL

## 2025-05-14 NOTE — PROGRESS NOTES
C3 nurse attempted to contact Mitch Whittaker for a TCC post hospital discharge follow up call. No answer. Left voicemail with callback information. The patient has a scheduled HOSFU appointment with Rosio Mittal on 05/15/2025 @ [id number].

## 2025-05-15 ENCOUNTER — OFFICE VISIT (OUTPATIENT)
Dept: CARDIOLOGY | Facility: CLINIC | Age: 70
End: 2025-05-15
Payer: COMMERCIAL

## 2025-05-15 ENCOUNTER — DOCUMENT SCAN (OUTPATIENT)
Dept: HOME HEALTH SERVICES | Facility: HOSPITAL | Age: 70
End: 2025-05-15
Payer: COMMERCIAL

## 2025-05-15 DIAGNOSIS — I50.43 ACUTE ON CHRONIC COMBINED SYSTOLIC AND DIASTOLIC CONGESTIVE HEART FAILURE: Primary | ICD-10-CM

## 2025-05-15 DIAGNOSIS — I25.118 CORONARY ARTERY DISEASE OF NATIVE ARTERY OF NATIVE HEART WITH STABLE ANGINA PECTORIS: Chronic | ICD-10-CM

## 2025-05-15 DIAGNOSIS — I44.7 LBBB (LEFT BUNDLE BRANCH BLOCK): ICD-10-CM

## 2025-05-15 NOTE — PROGRESS NOTES
C3 nurse attempted to contact Mitch Whittaker for a TCC post hospital discharge follow up call. No answer. Left voicemail with callback information. The patient has a scheduled HOSFU appointment with Rosio Mittal on 05/15/2025 @ 0419.

## 2025-05-15 NOTE — PROGRESS NOTES
"HF TCC Provider Note (Follow-up) Consult Note      HPI:  The patient is a 70yo male with CAD, Combined CHF EF 35 - 40%, s/p Renal transplant 2015, Chronic immunosuppression, CKD3, DM, Anemia, HLD, HTN, Hx DVT- on Eliquis, Gout, Chronic venous stasis and recurrent skin infections, and Morbid obesity who presented to the ED with abdominal pain. Pt reports lower back pain radiating to his abdomen describes as an aching pain rated 8/10 that has occurred on/off for the last 3 days. Pt denies fever, chills, chest pain, SOB, cough, N/V, Diarrhea, or constipation. He reports a normal BM yesterday. Pt also complains of worsening weakness and pain to arms and legs which he attributs to gout. He reports pain is worse to his hands, left knee, and ankles. He reports his left knee has been swollen "for awhile". He reports chronic swelling to BLE. Pt is bed bound at home and lives with his wife and daughters who are his caretakers.   Pt reports abdominal pain has resolved since arrival to ED      In the ED, Afebrile, VSS, oxygenation normal. Labs revealed mild leukopenia with WBC 3.4, Hgb 7.3, BUN 79, sCr 5.7 (baseline 1.9-2.8), Albumin 1.5, , Trop 0.067>0.062. EKG showed sinus tachycardia -rate 108, PACs, nonspecific intraventricular block   CT chest/abd/pelvis showed Small left pleural effusion, Nonspecific distention of the gallbladder, No gallbladder wall thickening or bile duct dilatation, Fatty liver, Native kidneys are small and atrophic, No hydronephrosis, No ureteral stones, Right renal transplant.    Patient is a 69-year-old male with a history of coronary artery disease, biventricular CHF with an EF of 35-40%, status post living related donor kidney transplant 2015 on chronic immunosuppression, ANGELITO on CKD 3, diabetes mellitus, anemia, hypertension, history of DVT on Eliquis, gout, chronic venous stasis, morbid obesity.  Patient presented to the emergency department complaining of back pain radiating to his " abdomen 8/10.  He reports it is has been occurring for approximately 3 days prior to admission.  Health who complains of generalized body ache worse pain in his legs and knees.  He states this has been going on for quite some time.  Emergency department labs revealed a white count of 3.4 hemoglobin 7.3 creatinine of 5.7 which is up from about 3.  Albumin is 1.5.  CT scan chest abdomen and pelvis revealed a small left pleural effusion otherwise none specific.     Patient was seen by his nephrologist Dr. Gonsalez who initiated Lasix drip with metolazone.  After review by rounding nephrologist it was felt that he was maybe slightly over diuresed.  Diuretics were held and patient was started on normal saline at 100 cc an hour for 2 L.  Prograf level noted to be 10 and Prograf decreased to 2 mg b.i.d. recheck level in a.m.  Markedly elevated uric acid, patient was initiated on Uloric.  Seen and examined by Physical therapy/Occupational therapy who felt that patient was at his baseline status and declined further intervention.      As of 5/12/25 patient was scheduled to have colonoscopy but did not complete bowel prep. He did not have interest in completing bowel prep this admission, so GI will offer OP if he is agreeable. H/H has remained stable. He was asked to hold calcitrol until instructed by Nephrology. He will also continue ketconazole. He was asked to follow up with PCP to repeat tacrolimus level. For gout, he will continue Uloric instead of Allopurinol. He will hold STATINs while taking Uloric. Patient will discharge with HH and family support. Patient seen and examined on date of discharge and deemed suitable.       PHYSICAL:     JVD: no,    Heart rhythm: regular  Cardiac murmur: No    S3: no  S4: no  Lungs: clear  Liver span: 10 cm:   Hepatojugular reflux: no  Edema: no,       ASSESSMENT: chronic systolic HF    PLAN:      Patient Instructions:   Instruct the patient to notify this clinic if HH, a physician or an  advanced care provider wants to change medication one of their HF medications   Activity and Diet restrictions:   Recommend 2-3 gram sodium restriction and 1500cc- 2000cc fluid restriction.  Encourage physical activity with graded exercise program.  Requested patient to weigh themselves daily, and to notify us if their weight increases by more than 3 lbs in 1 day or 5 lbs in 3 days.    Assigned dry weight on home scale: daily weight    Medication changes (include current dose and changed dose)  Stop crestor  Continue lasix 40 mg daily, ok to take extra prn  Went through diet recs for Low Sodium  Likely would benefit from palliative care with multiple admits less than 30 days  RTC prn  Ok to take metolazone prn

## 2025-05-16 NOTE — PROGRESS NOTES
C3 nurse attempted to contact Mitch Whittaker for a TCC post hospital discharge follow up call. No answer. Left voicemail with callback information. The patient has a scheduled HOSFU appointment with Rosio Mittal on 05/15/2025 @ 3263.

## 2025-05-21 DIAGNOSIS — E11.649 UNCONTROLLED TYPE 2 DIABETES MELLITUS WITH HYPOGLYCEMIA WITHOUT COMA: ICD-10-CM

## 2025-05-21 DIAGNOSIS — E11.21 TYPE 2 DIABETES MELLITUS WITH DIABETIC NEPHROPATHY, UNSPECIFIED WHETHER LONG TERM INSULIN USE: ICD-10-CM

## 2025-05-21 RX ORDER — BLOOD-GLUCOSE SENSOR
1 EACH MISCELLANEOUS
Qty: 3 EACH | Refills: 0 | Status: SHIPPED | OUTPATIENT
Start: 2025-05-21 | End: 2026-05-21

## 2025-05-27 ENCOUNTER — PATIENT MESSAGE (OUTPATIENT)
Dept: CARDIOLOGY | Facility: CLINIC | Age: 70
End: 2025-05-27
Payer: COMMERCIAL

## 2025-05-30 RX ORDER — TACROLIMUS 1 MG/1
CAPSULE ORAL
Qty: 180 CAPSULE | Refills: 11 | Status: CANCELLED | OUTPATIENT
Start: 2025-05-30

## 2025-06-04 DIAGNOSIS — Z94.0 KIDNEY REPLACED BY TRANSPLANT: ICD-10-CM

## 2025-06-04 RX ORDER — TACROLIMUS 1 MG/1
4 CAPSULE ORAL EVERY 12 HOURS
Qty: 240 CAPSULE | Refills: 11 | Status: SHIPPED | OUTPATIENT
Start: 2025-06-04 | End: 2026-06-04

## 2025-06-17 ENCOUNTER — TELEPHONE (OUTPATIENT)
Dept: OPHTHALMOLOGY | Facility: CLINIC | Age: 70
End: 2025-06-17
Payer: COMMERCIAL

## 2025-06-17 RX ORDER — NEOMYCIN SULFATE, POLYMYXIN B SULFATE AND DEXAMETHASONE 3.5; 10000; 1 MG/ML; [USP'U]/ML; MG/ML
1 SUSPENSION/ DROPS OPHTHALMIC 4 TIMES DAILY
Qty: 5 ML | Refills: 0 | Status: SHIPPED | OUTPATIENT
Start: 2025-06-17 | End: 2025-07-01

## 2025-06-17 NOTE — TELEPHONE ENCOUNTER
Copied from CRM #[id number]. Topic: Appointments - Appointment Access  >> Jun 17, 2025  1:27 PM Kianna wrote:  Type:  Sooner Apoointment Request    Caller is requesting a sooner appointment.  Caller declined first available appointment listed below.  Caller will not accept being placed on the waitlist and is requesting a message be sent to doctor.  Name of Caller:Edward-Daughter   When is the first available appointment?  Symptoms:right eye is bleeding (internal)  Would the patient rather a call back or a response via [facility]? Call   Best Call Back Number:[phone]   Additional Information: She was wanting to see if the office offers virtual appointments. Its starting to clear up a little but she wants to know if there is anything he can do for it.

## 2025-06-19 ENCOUNTER — DOCUMENT SCAN (OUTPATIENT)
Dept: HOME HEALTH SERVICES | Facility: HOSPITAL | Age: 70
End: 2025-06-19
Payer: COMMERCIAL

## 2025-06-25 ENCOUNTER — PATIENT MESSAGE (OUTPATIENT)
Dept: HOME HEALTH SERVICES | Facility: CLINIC | Age: 70
End: 2025-06-25
Payer: COMMERCIAL

## 2025-06-25 ENCOUNTER — OFFICE VISIT (OUTPATIENT)
Dept: INTERNAL MEDICINE | Facility: CLINIC | Age: 70
End: 2025-06-25
Payer: COMMERCIAL

## 2025-06-25 DIAGNOSIS — L98.9 SKIN LESION: Primary | ICD-10-CM

## 2025-06-25 DIAGNOSIS — R53.81 PHYSICAL DEBILITY: ICD-10-CM

## 2025-06-25 PROCEDURE — 3066F NEPHROPATHY DOC TX: CPT | Mod: CPTII,95,, | Performed by: FAMILY MEDICINE

## 2025-06-25 PROCEDURE — 3044F HG A1C LEVEL LT 7.0%: CPT | Mod: CPTII,95,, | Performed by: FAMILY MEDICINE

## 2025-06-25 PROCEDURE — 4010F ACE/ARB THERAPY RXD/TAKEN: CPT | Mod: CPTII,95,, | Performed by: FAMILY MEDICINE

## 2025-06-25 PROCEDURE — 98005 SYNCH AUDIO-VIDEO EST LOW 20: CPT | Mod: 95,,, | Performed by: FAMILY MEDICINE

## 2025-06-25 NOTE — PROGRESS NOTES
Subjective:       Patient ID: Mitch Whittaker is a 69 y.o. male.    Chief Complaint: Follow-up    The patient location is: [place], home  The chief complaint leading to consultation is: leg lesions    Visit type: audiovisual    Face to Face time with patient: 8 minutes (chart review started 12:19 pm; video started 12:22 pm; video ended 12:30 pm)  15 minutes of total time spent on the encounter, which includes face to face time and non-face to face time preparing to see the patient (eg, review of tests), Obtaining and/or reviewing separately obtained history, Documenting clinical information in the electronic or other health record, Independently interpreting results (not separately reported) and communicating results to the patient/family/caregiver, or Care coordination (not separately reported).     Each patient to whom he or she provides medical services by telemedicine is:  (1) informed of the relationship between the physician and patient and the respective role of any other health care provider with respect to management of the patient; and (2) notified that he or she may decline to receive medical services by telemedicine and may withdraw from such care at any time.    History of Present Illness    - Patient presents with inability to walk and new lesions on his legs.  - Patient reports being unable to walk, a new development since his last visit in December. He has been hospitalized multiple times in recent months, including a stay in early January for acute kidney injury and C. diff colitis. His diarrhea from C. diff has resolved. He had another hospitalization for severe gout in his leg. His gout medication was changed, and the new medication is reportedly more effective than the previous one.  - Patient is currently receiving home health care from [facility]. He is able to move from the bed to a wheelchair independently, but cannot walk yet. The therapist visits twice weekly and has advised him to  practice his therapy exercises daily.  - On Sunday, patient developed small, blistering lesions on his leg after bathing. These lesions are not opening or showing any purulence or blood. The lesions are not pruritic or bothersome to the patient.   - Patient is currently using a hospital bed at home and has not been able to attend any in-person office visits. He has upcoming virtual visits scheduled, including one with nephrology tomorrow at 9:00 AM. He is also being followed by a cardiology team.  - Patient denies pruritus, discomfort, or any open sores on his legs.  Patient and caregiver are otherwise without concerns today.      Review of Systems   Constitutional:  Negative for activity change and unexpected weight change.   HENT:  Positive for rhinorrhea. Negative for hearing loss and trouble swallowing.    Eyes:  Negative for discharge and visual disturbance.   Respiratory:  Negative for chest tightness and wheezing.    Cardiovascular:  Negative for chest pain and palpitations.   Gastrointestinal:  Negative for blood in stool, constipation, diarrhea and vomiting.   Endocrine: Negative for polydipsia and polyuria.   Genitourinary:  Negative for difficulty urinating, hematuria and urgency.   Musculoskeletal:  Negative for arthralgias, joint swelling and neck pain.   Neurological:  Negative for weakness and headaches.   Psychiatric/Behavioral:  Negative for confusion and dysphoric mood.          Objective:      Physical Exam  Constitutional:       General: He is not in acute distress.     Appearance: He is well-developed.      Comments: Lying in hospital bed   HENT:      Head: Normocephalic and atraumatic.   Eyes:      General: Lids are normal. No scleral icterus.     Extraocular Movements: Extraocular movements intact.      Conjunctiva/sclera: Conjunctivae normal.   Pulmonary:      Effort: Pulmonary effort is normal.   Skin:     Comments: Venous stasis changes noted on lower extremities; small papular lesions  noted on legs (although difficult to see clearly on video)   Neurological:      Mental Status: He is alert and oriented to person, place, and time.   Psychiatric:         Mood and Affect: Mood and affect normal.           Assessment:       1. Skin lesion    2. Physical debility        Plan:   1. Skin lesion  -     [facility]; Future; Expected date: 07/02/2025    2. Physical debility  -     Ambulatory referral/consult to [facility] [place] - Medical; Future; Expected date: 07/02/2025      Assessment & Plan    ## MOBILITY ISSUES:   Assessed the patient's mobility issues.   Patient is unable to walk and requires assistance moving from bed to wheelchair.   Evaluated the home health therapist's suggestion regarding daily walking practice.   Recommend continued adherence to current physical therapy exercises to improve walking ability as prescribed by the home health therapist.    ## SKIN CONDITION:   Evaluated skin condition on legs.   Patient has bumps on the leg that are not open or itching.   Will put in referral for [facility] for [facility] to evaluate issue in person.    Follow up in about 3 months (around 9/25/2025), or if symptoms worsen or fail to improve, for annual with Rosio VINCENT (morning appointment so he can fast).    This note was generated with the assistance of ambient listening technology. Verbal consent was obtained by the patient and accompanying visitor(s) for the recording of patient appointment to facilitate this note. I attest to having reviewed and edited the generated note for accuracy, though some syntax or spelling errors may persist. Please contact the author of this note for any clarification.

## 2025-06-26 ENCOUNTER — OFFICE VISIT (OUTPATIENT)
Dept: NEPHROLOGY | Facility: CLINIC | Age: 70
End: 2025-06-26
Payer: COMMERCIAL

## 2025-06-26 DIAGNOSIS — Z94.0 KIDNEY TRANSPLANTED: Primary | ICD-10-CM

## 2025-06-26 NOTE — PROGRESS NOTES
The patient location is: home in [place], [place]  The chief complaint leading to consultation is: kidney transplant,  post hospital follow-up visit for CHF exacerbation     Visit type: audiovisual    Face to Face time with patient: 15  40 minutes of total time spent on the encounter, which includes face to face time and non-face to face time preparing to see the patient (eg, review of tests), Obtaining and/or reviewing separately obtained history, Documenting clinical information in the electronic or other health record, Independently interpreting results (not separately reported) and communicating results to the patient/family/caregiver, or Care coordination (not separately reported).         Each patient to whom he or she provides medical services by telemedicine is:  (1) informed of the relationship between the physician and patient and the respective role of any other health care provider with respect to management of the patient; and (2) notified that he or she may decline to receive medical services by telemedicine and may withdraw from such care at any time.    Notes:             Renal clinic f/u note:  Date of clinic visit: 6/26/25  Reason for f/u and chief c/o: h/o of kidney transplant. CHF, CKD.  Post hospital follow-up visit for CHF exacerbation     HPI: Pt is a 70 y/o male with h/o of living related kidney transplant from his daughter in 2015 who presents virtually for f/u. Pt's daughter helped with the visit. Pt has a h/o of combined diastolic and systolic CHF (EF 40%), DM-2, HTN, CKD stage 3 (having worsened to CKD stage 4), and obesity. He was last seen in renal clinic about 7 weeks ago, and in hospital about 5-6 weeks ago. Chart was reviewed.  Patient was admitted to [facility] in early to mid May 2025 for CHF exacerbation.  He has had multiple hospital admissions for the same.  Hospital notes were reviewed.  He received the IV diuretics.  His condition improved.  He was discharged on 05/13/2025.    On  virtual visit today, patient's daughter is at the bedside.  Patient says that has remained bed-bound because walking is very difficult for her.  He is receiving physical therapy at home.  Regarding fluid issues, he denies any SOB, no worsening leg swelling.  Given this is a virtual visit, he says his leg swelling has overall improved.  Labs, meds, immunosuppressive meds and doses reviewed with pt.  He has remained compliant with his immunosuppressive medicines.  He is on 3 medicines, Prograf, mycophenolate, prednisone, the doses of which all were reviewed with him.  Diuretics were reviewed.  Patient takes Lasix 40 mg p.o. q.d.           PAST MEDICAL HISTORY: living related kidney transplant form daughter 6/15/2015 (on HD pre-transplant x 2.5 years), CKD stage 3 to 4, DM-2, HTN, CAD (coronary artery disease), combined diastolic and systolic (EF 40%) CHF, Chronic venous insufficiency of lower extremity, Diabetic retinopathy, Gallbladder polyp, 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, History of colon polyps, HPTH (hyperparathyroidism), Hyperlipidemia, LBBB (left bundle branch block) (12/20/2021), Morbid obesity with BMI of 45.0-49.9, adult, PCO (posterior capsular opacification), left (3/4/2019), Sleep apnea,     PAST SURGICAL HISTORY:  He  has a past surgical history that includes Retinal laser procedure; Cardiac catheterization (2008); Kidney transplant; and Colonoscopy (N/A, 4/5/2018).     SOCIAL HISTORY:  He  reports that he quit smoking about 8 years ago. He quit smokeless tobacco use about 8 years ago. He reports that he does not drink alcohol and does not use drugs.     FAMILY MEDICAL HISTORY:  His family history includes Diabetes in his maternal grandmother, mother, and sister; Heart failure in his mother; Hypertension in his mother; Kidney disease in his sister.               Review of patient's allergies indicates:   Allergen Reactions    Lisinopril Other (See Comments)       Other reaction(s):  cough     "Actos  [pioglitazone]         Other reaction(s): chf    Metformin         Other reaction(s): renal insuff  Other reaction(s): chf      Meds reviewed     Current Outpatient Medications   Medication Instructions    albuterol (PROVENTIL) 2.5 mg, Nebulization, Every 4-6 hours PRN    albuterol (PROVENTIL/VENTOLIN HFA) 90 mcg/actuation inhaler 2 puffs, Inhalation, Every 4 hours PRN    aspirin (ECOTRIN) 81 MG EC tablet 1 tablet, Daily    blood-glucose sensor (DEXCOM G7 SENSOR) Alba Use as directed for continuous glucose monitoring; Change every 10 days.    [Paused] calcitRIOL (ROCALTROL) 0.25 mcg, Oral, Daily    ELIQUIS 5 mg, Oral, 2 times daily    famotidine (PEPCID) 20 MG tablet TAKE ONE TABLET BY MOUTH EVERY EVENING    febuxostat (ULORIC) 40 mg, Oral, Daily    FeroSuL 325 mg, Oral, With breakfast    fish oil-omega-3 fatty acids 300-1,000 mg capsule 1 g, Daily    furosemide (LASIX) 40 mg, Oral, Daily    HYDROcodone-acetaminophen (NORCO) 5-325 mg per tablet 1 tablet, Oral, Every 6 hours PRN    insulin aspart U-100 (NOVOLOG FLEXPEN U-100 INSULIN) 25 Units, Subcutaneous, 3 times daily with meals    insulin aspart U-100 (NOVOLOG) 0-10 Units, Subcutaneous, Before meals & nightly PRN, **MODERATE CORRECTION DOSE**  Blood Glucose  mg/dL                  Pre-meal                2200  151-200                2 units                    1 unit  201-250                4 units                    2 units  251-300                6 units                    3 units  301-350                8 units                    4 units  >350                     10 units                  5 units  Administer subcutaneously if needed at times designated by monitoring  schedule.  DO NOT HOLD correction dose insulin for patients who are  NPO.    "HIGH ALERT MEDICATION" - Administer with meals or TF/TPN.    ketoconazole (NIZORAL) 200 mg Tab Take ½ tablet (100 mg total) by mouth once daily.    LANTUS SOLOSTAR U-100 INSULIN 15 Units, Subcutaneous, 2 times " daily    metoprolol succinate (TOPROL-XL) 25 mg, Oral, Daily    multivitamin (THERAGRAN) tablet 1 tablet, Daily    mycophenolate (CELLCEPT) 500 mg, Oral, 2 times daily    neomycin-polymyxin-dexamethasone (MAXITROL) 3.5mg/mL-10,000 unit/mL-0.1 % DrpS 1 drop, Both Eyes, 4 times daily    OZEMPIC 2 mg, Subcutaneous, Every 7 days    potassium chloride SA (K-DUR,KLOR-CON) 20 MEQ tablet 20 mEq, Oral, 2 times daily    predniSONE (DELTASONE) 5 mg, Oral, Daily    sodium bicarbonate 650 mg, Oral, Daily    tacrolimus (PROGRAF) 1 MG Cap Take 3 capsules (3 mg total) by mouth every morning AND 3 capsules (3 mg total) every evening. Until followup with nephrology.    triamcinolone acetonide 0.1% (KENALOG) 0.1 % ointment Apply topically 2 (two) times daily. Use on bilateral lower legs.             REVIEW OF SYSTEMS:  Patient has no fever, fatigue, visual changes, chest pain, edema, cough, dyspnea, nausea, vomiting, constipation, diarrhea, arthralgias, pruritis, dizziness, weakness, depression, confusion.     PHYSICAL EXAM:  Unable to assess vital signs  Looked comfortable on video  Gen:    WDWN male in no apparent distress  Psych: Normal mood and affect           Labs reviewed: no recent blood work.  Most recent blood work was reviewed  New blood work was ordered within the next week by [place] [facility]  BMP  Lab Results   Component Value Date     05/12/2025    K 3.8 05/12/2025     05/12/2025    CO2 24 05/12/2025    BUN 62 (H) 05/12/2025    CREATININE 3.0 (H) 05/12/2025    CALCIUM 9.5 05/12/2025    ANIONGAP 11 05/12/2025    EGFRNORACEVR 22 (L) 05/12/2025     Lab Results   Component Value Date    WBC 3.51 (L) 05/12/2025    HGB 9.7 (L) 05/12/2025    HCT 33.1 (L) 05/12/2025    MCV 82 05/12/2025     05/12/2025       Lab Results   Component Value Date    .2 (H) 03/18/2025    CALCIUM 9.5 05/12/2025    CAION 1.35 01/24/2025    PHOS 3.0 05/12/2025        Prograf level 5.0           Cardiac study: 1/18/22  reviewed:  Conclusion  Planar imaging demonstrates cardiac activity that is absent to that of the adjacent rib cage.  Study is negative for TTR amyloidosis with an uptake ratio of 1.03.  Perugini Score of 0     Echo 7/27/21 reviewed:  The left ventricle is normal in size with concentric hypertrophy and mildly decreased systolic function.  The estimated ejection fraction is 40%.  Normal right ventricular size with normal right ventricular systolic function.  Indeterminate left ventricular diastolic function.  Moderate left atrial enlargement.           IMPRESSION AND RECOMMENDATIONS:  70 y/o male with h/o of kidney transplant present for f/u after recent hospital admission for CHF exacerbation     1. Renal: s Cr within baseline range.  Has improved and is currently stable.  However overall baseline s Cr is now higher.  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  Likely related to CHF issues and diuretics.    Reason for s Cr fluctuations is the use of diuretics and h/o of CHF. Pre-renal azotemia  OK to continue diuretics (despite changes in Cr) as pt will need diuretics for fluid balance  The dose of the diuretics should be proportional to the amount of fluid gain  Though this is a virtual visit today, as the patient can not be examined directly, the current dose of Lasix 40 mg p.o. q.d. appears appropriate     H/o of DM and HTN  Was on HD x 2.5 years before transplant     Complications of CKD:  K normal  Na normal  Acid base stable, metabolic alkalosis due to diuretics  Ca normal  PO4 stable  Secondary hyperparathyroidism, on calcitriol   Anemia, following with hem/onc      2. Immunosuppression  H/o of living related kidney transplant in 2015 form daughter  Prograf level is within the therapeutic range  Takes progaf 3 mg po bid, no change in dose  We will not change the dose continue the same  Noted not on ketoconazole:  We will keep off for now   On  mycophenolate 500 mg p.o. b.i.d.  On prednisone 5 mg p.o. q.d.  Meds and doses reviewed with pt and his daughter     Leukopenia:  Has resolved. WBC stable.      3. Cardiac: diastolic and systolic CHF  Recent hospital admission for readmission for CHF exacerbation  Required IV diuretics  Currently fluid status stable, though he could not be examined today based on having a virtual visit   Continue Lasix 40 mg p.o. q.d.  Limit salt in diet <2-3 g/day  Limit fluids < 1.5 L/day  Advised pt to limit water intake  Discussed above with the patient's daughter    Previously cardiac amyloid was suspected, was ruled out by above cardiac test  SPEP and UPEP unremarkable, no monoclonality     4. Diarrhea in Dec 2022. Has resolved.  No current issues  Last CMV titer by PCR negative   S/p colonoscopy on 6/7/23, found no sign of virally induced inflammation  No sign of CMV colitis on colonoscopy  S/p Valcyte      5. HTN: BP normal to low in the past.  Could not evaluate today on virtual visit  Meds reviewed         Plans and recommendations:  As discussed above  Complex visit  Total time spent 40 minutes including time needed to review the records, the   patient evaluation, documentation, face-to-face discussion with the patient,   more than 50% of the time was spent on coordination of care and counseling.    Level V visit.  RTC 2 months for close follow-up     Hany Gonsalez MD

## 2025-06-30 ENCOUNTER — APPOINTMENT (OUTPATIENT)
Dept: LAB | Facility: HOSPITAL | Age: 70
End: 2025-06-30
Attending: INTERNAL MEDICINE
Payer: COMMERCIAL

## 2025-06-30 DIAGNOSIS — Z94.0 KIDNEY REPLACED BY TRANSPLANT: ICD-10-CM

## 2025-06-30 DIAGNOSIS — I13.0 MALIGNANT HYPERTENSION, HEART FAILURE & CHRONIC KIDNEY DIS STAGE IV: Primary | ICD-10-CM

## 2025-06-30 DIAGNOSIS — N18.4 MALIGNANT HYPERTENSION, HEART FAILURE & CHRONIC KIDNEY DIS STAGE IV: Primary | ICD-10-CM

## 2025-06-30 LAB
ABSOLUTE EOSINOPHIL (OHS): 0.01 K/UL
ABSOLUTE MONOCYTE (OHS): 0.39 K/UL (ref 0.3–1)
ABSOLUTE NEUTROPHIL COUNT (OHS): 1.66 K/UL (ref 1.8–7.7)
ALBUMIN SERPL BCP-MCNC: 2.7 G/DL (ref 3.5–5.2)
ANION GAP (OHS): 9 MMOL/L (ref 8–16)
BASOPHILS # BLD AUTO: 0.03 K/UL
BASOPHILS NFR BLD AUTO: 1 %
BUN SERPL-MCNC: 68 MG/DL (ref 8–23)
CALCIUM SERPL-MCNC: 8.6 MG/DL (ref 8.7–10.5)
CHLORIDE SERPL-SCNC: 111 MMOL/L (ref 95–110)
CO2 SERPL-SCNC: 21 MMOL/L (ref 23–29)
CREAT SERPL-MCNC: 2.6 MG/DL (ref 0.5–1.4)
ERYTHROCYTE [DISTWIDTH] IN BLOOD BY AUTOMATED COUNT: 17.4 % (ref 11.5–14.5)
GFR SERPLBLD CREATININE-BSD FMLA CKD-EPI: 26 ML/MIN/1.73/M2
GLUCOSE SERPL-MCNC: 178 MG/DL (ref 70–110)
HCT VFR BLD AUTO: 29.5 % (ref 40–54)
HGB BLD-MCNC: 8.8 GM/DL (ref 14–18)
IMM GRANULOCYTES # BLD AUTO: 0.06 K/UL (ref 0–0.04)
IMM GRANULOCYTES NFR BLD AUTO: 2 % (ref 0–0.5)
LYMPHOCYTES # BLD AUTO: 0.81 K/UL (ref 1–4.8)
MCH RBC QN AUTO: 24.6 PG (ref 27–31)
MCHC RBC AUTO-ENTMCNC: 29.8 G/DL (ref 32–36)
MCV RBC AUTO: 83 FL (ref 82–98)
NUCLEATED RBC (/100WBC) (OHS): 0 /100 WBC
PHOSPHATE SERPL-MCNC: 3.2 MG/DL (ref 2.7–4.5)
PLATELET # BLD AUTO: 164 K/UL (ref 150–450)
PMV BLD AUTO: 11.7 FL (ref 9.2–12.9)
POTASSIUM SERPL-SCNC: 4.1 MMOL/L (ref 3.5–5.1)
RBC # BLD AUTO: 3.57 M/UL (ref 4.6–6.2)
RELATIVE EOSINOPHIL (OHS): 0.3 %
RELATIVE LYMPHOCYTE (OHS): 27.4 % (ref 18–48)
RELATIVE MONOCYTE (OHS): 13.2 % (ref 4–15)
RELATIVE NEUTROPHIL (OHS): 56.1 % (ref 38–73)
SODIUM SERPL-SCNC: 141 MMOL/L (ref 136–145)
WBC # BLD AUTO: 2.96 K/UL (ref 3.9–12.7)

## 2025-06-30 PROCEDURE — 80197 ASSAY OF TACROLIMUS: CPT

## 2025-06-30 PROCEDURE — 85025 COMPLETE CBC W/AUTO DIFF WBC: CPT

## 2025-06-30 PROCEDURE — 80069 RENAL FUNCTION PANEL: CPT

## 2025-06-30 PROCEDURE — 36415 COLL VENOUS BLD VENIPUNCTURE: CPT | Mod: PN

## 2025-07-01 LAB — TACROLIMUS BLD-MCNC: 13.5 NG/ML (ref 5–15)

## 2025-07-02 DIAGNOSIS — Z94.0 KIDNEY REPLACED BY TRANSPLANT: ICD-10-CM

## 2025-07-02 RX ORDER — MYCOPHENOLATE MOFETIL 250 MG/1
500 CAPSULE ORAL 2 TIMES DAILY
Qty: 120 CAPSULE | Refills: 11 | Status: SHIPPED | OUTPATIENT
Start: 2025-07-02 | End: 2026-07-02

## 2025-07-03 DIAGNOSIS — E11.21 TYPE 2 DIABETES MELLITUS WITH DIABETIC NEPHROPATHY, UNSPECIFIED WHETHER LONG TERM INSULIN USE: ICD-10-CM

## 2025-07-03 DIAGNOSIS — E11.649 UNCONTROLLED TYPE 2 DIABETES MELLITUS WITH HYPOGLYCEMIA WITHOUT COMA: ICD-10-CM

## 2025-07-03 RX ORDER — TACROLIMUS 1 MG/1
CAPSULE ORAL
Qty: 180 CAPSULE | Refills: 5 | Status: SHIPPED | OUTPATIENT
Start: 2025-07-03

## 2025-07-03 RX ORDER — BLOOD-GLUCOSE SENSOR
1 EACH MISCELLANEOUS
Qty: 3 EACH | Refills: 0 | Status: SHIPPED | OUTPATIENT
Start: 2025-07-03 | End: 2026-07-03

## 2025-07-03 RX ORDER — TACROLIMUS 1 MG/1
CAPSULE ORAL
Qty: 180 CAPSULE | Refills: 5 | Status: CANCELLED | OUTPATIENT
Start: 2025-07-03

## 2025-07-07 RX ORDER — FEBUXOSTAT 40 MG/1
40 TABLET, FILM COATED ORAL DAILY
Qty: 30 TABLET | Refills: 1 | OUTPATIENT
Start: 2025-07-07

## 2025-07-08 RX ORDER — FEBUXOSTAT 40 MG/1
40 TABLET, FILM COATED ORAL DAILY
Qty: 30 TABLET | Refills: 1 | Status: CANCELLED | OUTPATIENT
Start: 2025-07-08

## 2025-07-15 RX ORDER — FEBUXOSTAT 40 MG/1
40 TABLET, FILM COATED ORAL DAILY
Qty: 30 TABLET | Refills: 1 | OUTPATIENT
Start: 2025-07-15

## 2025-07-16 RX ORDER — FEBUXOSTAT 40 MG/1
40 TABLET, FILM COATED ORAL DAILY
Qty: 30 TABLET | Refills: 1 | Status: CANCELLED | OUTPATIENT
Start: 2025-07-16

## 2025-07-20 DIAGNOSIS — E11.649 UNCONTROLLED TYPE 2 DIABETES MELLITUS WITH HYPOGLYCEMIA WITHOUT COMA: ICD-10-CM

## 2025-07-20 NOTE — TELEPHONE ENCOUNTER
No care due was identified.  [facility] Embedded Care Due Messages. Reference number: [id number].   7/20/2025 10:53:43 AM CDT

## 2025-07-21 NOTE — TELEPHONE ENCOUNTER
Refill Routing Note   Medication(s) are not appropriate for processing by [facility] for the following reason(s):        Required labs abnormal    [facility] action(s):  Defer               Appointments  past 12m or future 3m with PCP    Date Provider   Last Visit   6/25/2025 Valery Caal MD   Next Visit   Visit date not found Valery Caal MD   ED visits in past 90 days: 0        Note composed:9:03 PM 07/20/2025

## 2025-07-22 RX ORDER — INSULIN GLARGINE 100 [IU]/ML
15 INJECTION, SOLUTION SUBCUTANEOUS 2 TIMES DAILY
Qty: 30 ML | Refills: 1 | Status: SHIPPED | OUTPATIENT
Start: 2025-07-22

## 2025-07-24 ENCOUNTER — CARE AT HOME (OUTPATIENT)
Dept: HOME HEALTH SERVICES | Facility: CLINIC | Age: 70
End: 2025-07-24
Payer: COMMERCIAL

## 2025-07-24 DIAGNOSIS — I15.1 HYPERTENSION SECONDARY TO OTHER RENAL DISORDERS: Chronic | ICD-10-CM

## 2025-07-24 DIAGNOSIS — Z94.0 HISTORY OF RENAL TRANSPLANT: ICD-10-CM

## 2025-07-24 DIAGNOSIS — M10.9 GOUT, UNSPECIFIED CAUSE, UNSPECIFIED CHRONICITY, UNSPECIFIED SITE: Primary | ICD-10-CM

## 2025-07-24 RX ORDER — FEBUXOSTAT 40 MG/1
40 TABLET, FILM COATED ORAL DAILY
Qty: 30 TABLET | Refills: 1 | Status: SHIPPED | OUTPATIENT
Start: 2025-07-24

## 2025-07-24 NOTE — PROGRESS NOTES
[facility]  Medical Chronic Care Home Visit    Encounter Provider: Javy Su   PCP: Valery Caal MD  Consult Requested By: Dr. Valery Caal    HISTORY OF PRESENT ILLNESS      Patient ID: Mitch Whittaker is a 69 y.o. male is being seen in the home due to physical debility that presents a taxing effort to leave the home, to mitigate high risk of hospital readmission and/or due to the limited availability of reliable or safe options for transportation to the point of access to the provider. Prior to treatment on this visit the chart was reviewed and patient verbal consent was obtained.    Chronic medical conditions synopsis:  Patient is a 69 y.o. male being seen today for a follow up visit. 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  Granddaughter is present during visit.  Patient is sitting on bed during the visit.  Bed-bound at the at baseline.  Working with home health and is able to stand on the side of bed with assistance.  Currently has home health with [facility].  Reports needing refill on a couple medications.  We will recheck uric acid level.  Denies further complaints or concerns at this time. Questions elicited. Time allowed for questions, all issues addressed. Contact info given for any concerns or changes.  We will follow up in 4-6 weeks or as needed.    DECISION MAKING TODAY       Assessment & Plan:  1. Gout, unspecified cause, unspecified chronicity, unspecified site  Assessment & Plan:  Continue Uloric  Recheck uric acid level    Orders:  -     URIC ACID; Future; Expected date: 07/24/2025  -     febuxostat (ULORIC) 40 mg Tab; Take 1 tablet (40 mg total) by mouth once daily.  Dispense: 30 tablet; Refill: 1    2. Hypertension secondary to other renal disorders  Assessment & Plan:  Chronic and stable  Continue current medications      3. History of renal  transplant  Assessment & Plan:  Followed by nephrology  Continue current medications         ENVIRONMENT OF CARE   Family and/or Caregiver present at visit?  Yes  Name of Caregiver: daughter  History provided by: patient and caregiver    4Ms for Medical Decision-Making in Older Adults    Last Completed EAWV:  None    MEDICATIONS:  High Risk Medications:  Total Active Medications: 1  HYDROcodone-acetaminophen - 5-325 mg    MOBILITY:  Activities of Daily Living:       No data to display              Fall Risk:      12/30/2024    11:00 AM 12/20/2024     3:30 PM 12/10/2024     3:20 PM   Fall Risk Assessment - Outpatient   Mobility Status Ambulatory  Ambulatory   Number of falls 0 0 0   Identified as fall risk False  False     Disability Status:      6/18/2015     5:00 PM   Disability Status   Are you deaf or do you have serious difficulty hearing? N   Are you blind or do you have serious difficulty seeing, even when wearing glasses? N   Because of a physical, mental, or emotional condition, do you have serious difficulty concentrating, remembering, or making decisions? N   Do you have serious difficulty walking or climbing stairs? N   Do you have difficulty dressing or bathing? N   Because of a physical, mental, or emotional condition, do you have difficulty doing errands alone such as visiting a doctor's office or shopping? N     Nutrition Screening:       No data to display             Screening Score: 0-7 Malnourished, 8-11 At Risk, 12-14 Normal  Get Up and Go:       No data to display              Whisper Test:       No data to display                    MENTATION:   Has Dementia Dx: No  Has Anxiety Dx: No    Depression Patient Health Questionnaire:      3/31/2025     3:21 PM   Depression Patient Health Questionnaire   Over the last two weeks how often have you been bothered by little interest or pleasure in doing things Not at all   Over the last two weeks how often have you been bothered by feeling down, depressed  or hopeless Not at all   PHQ-2 Total Score 0     Cognitive Function Screening:       No data to display              Cognitive Function Screening Total - Less than 4 = Abnormal,  Greater than or equal to 4 = Normal        WHAT MATTERS MOST:  Advance Care Planning   ACP Status:   Patient has had an ACP conversation  Living Will: No  Power of : No  POLST: No           Is patient hospice appropriate: No  (If needed, use PPS <30 or FAST score >7)  Was referral to hospice placed: No    Impression upon entering the home:  Physical Dwelling: single family home   Appearance of home environment: cleaniness: clean, walking pathways: clear, lighting: adequate, and home structure: sound structure  Functional Status: minimal assistance  Mobility: chair bound  Nutritional access: adequate intake and access  Home Health: Yes,  Agency [facility]   DME/Supplies: hospital bed, rolling walker, and wheelchair     Disease/illness education: Take all medication as prescribed. Activity as tolerated. Keep all upcoming appts.   Establishment or re-establishment of referral orders for community resources: No other necessary community resources.   Discussion with other health care providers: No discussion with other health care providers necessary.   Does patient have a PCP at [facility]? Yes   Repatriation plan with PCP? [facility] reason: mobility   Does patient have an ostomy (ileostomy, colostomy, suprapubic catheter, nephrostomy tube, tracheostomy, PEG tube, pleurex catheter, cholecystostomy, etc)? No  Were BPAs reviewed? Yes    Social History     Socioeconomic History    Marital status: Legally     Number of children: 2   Occupational History    Occupation: retired     Employer: Retired   Tobacco Use    Smoking status: Former     Current packs/day: 0.00     Types: Cigarettes     Quit date: 2013     Years since quittin.1     Passive exposure: Past    Smokeless tobacco: Former     Quit date: 2013    Tobacco  comments:     used marijuana since [id number], stopped after started dialysis   Substance and Sexual Activity    Alcohol use: No     Alcohol/week: 0.0 standard drinks of alcohol    Drug use: Not Currently     Comment:      Sexual activity: Never   Social History Narrative    . Lives with spouse. Has 2 children. Patient retired as  for [employer]. He has been washing cars.     Social Drivers of Health     Financial Resource Strain: Low Risk  (5/6/2025)    Overall Financial Resource Strain (CARDIA)     Difficulty of Paying Living Expenses: Not very hard   Food Insecurity: No Food Insecurity (5/6/2025)    Hunger Vital Sign     Worried About Running Out of Food in the Last Year: Never true     Ran Out of Food in the Last Year: Never true   Transportation Needs: No Transportation Needs (5/6/2025)    PRAPARE - Transportation     Lack of Transportation (Medical): No     Lack of Transportation (Non-Medical): No   Recent Concern: Transportation Needs - Unmet Transportation Needs (4/21/2025)    Received from [facility]    PRAPARE - Transportation     Lack of Transportation (Medical): Yes     Lack of Transportation (Non-Medical): No   Physical Activity: Inactive (4/21/2025)    Received from [facility]    Exercise Vital Sign     Days of Exercise per Week: 0 days     Minutes of Exercise per Session: 0 min   Stress: No Stress Concern Present (5/6/2025)    [place] [place] [employer] - Occupational Stress Questionnaire     Feeling of Stress : Not at all   Recent Concern: Stress - Stress Concern Present (4/21/2025)    Received from [facility]    [place] [place] [employer] - Occupational Stress Questionnaire     Feeling of Stress : To some extent   Housing Stability: Low Risk  (5/6/2025)    Housing Stability Vital Sign     Unable to Pay for Housing in the Last Year: No     Number of Times Moved in the Last  Year: 0     Homeless in the Last Year: No         OBJECTIVE:     Vital Signs:  Vitals:    07/24/25 1003   BP: 122/70   Pulse: 78   Resp: 18       Review of Systems   Constitutional: Negative.    HENT: Negative.     Eyes: Negative.    Respiratory: Negative.  Negative for chest tightness.    Cardiovascular: Negative.  Negative for leg swelling.   Gastrointestinal: Negative.    Endocrine: Negative.    Genitourinary: Negative.    Musculoskeletal:  Positive for gait problem.   Skin:  Positive for rash.   Allergic/Immunologic: Negative.    Neurological:  Positive for weakness.   Hematological: Negative.    Psychiatric/Behavioral: Negative.  Negative for agitation.    All other systems reviewed and are negative.      Physical Exam:  Physical Exam  Vitals reviewed.   Constitutional:       General: He is not in acute distress.     Appearance: Normal appearance. He is well-developed.   HENT:      Head: Normocephalic and atraumatic.      Nose: Nose normal.      Mouth/Throat:      Mouth: Mucous membranes are dry.      Pharynx: Oropharynx is clear.   Eyes:      Pupils: Pupils are equal, round, and reactive to light.   Cardiovascular:      Rate and Rhythm: Normal rate and regular rhythm.      Pulses: Normal pulses.      Heart sounds: Normal heart sounds.   Pulmonary:      Effort: Pulmonary effort is normal.      Breath sounds: Normal breath sounds.   Abdominal:      General: Bowel sounds are normal.      Palpations: Abdomen is soft.   Musculoskeletal:         General: Normal range of motion.      Cervical back: Normal range of motion and neck supple.   Skin:     General: Skin is warm and dry.      Comments: Chronic venous stasis BLE   Neurological:      General: No focal deficit present.      Mental Status: He is alert and oriented to person, place, and time. Mental status is at baseline.      Gait: Gait abnormal.   Psychiatric:         Mood and Affect: Mood normal.         Behavior: Behavior normal.         Thought Content:  Thought content normal.         Judgment: Judgment normal.       INSTRUCTIONS FOR PATIENT:   - Continue all medications, treatments and therapies as ordered.   - Follow all instructions, recommendations as discussed.  - Maintain Safety Precautions at all times.  - Attend all medical appointments as scheduled.  - For worsening symptoms: call Primary Care Physician or Nurse Practitioner.  - For emergencies, call 911 or immediately report to the nearest emergency room.   Scheduled Follow-up, Appts Reviewed with Modifications if Needed: Yes  Future Appointments   Date Time Provider Department [place]   8/20/2025 11:40 AM LABORATORY, [facility] [place]   8/25/2025 11:30 AM Michelle Batron PA-C [facility]   10/1/2025  8:20 AM Rosio Mittal PA-C [facility]   10/2/2025 11:00 AM Hany Gonsalez MD [facility]       Current Medications[1]    Medication Reconciliation:  Were medications changed during this appointment? No  Were medications in the home? Yes  Is the patient taking the medications as directed? Yes  Does the patient/caregiver understand the medications and changes? Yes  Does updated med list accurately reflects meds patient is currently taking? Yes    I spent a total of 41 minutes on the day of the visit.This includes face to face time and non-face to face time preparing to see the patient (eg, review of tests), obtaining and/or reviewing separately obtained history, documenting clinical information in the electronic or other health record, independently interpreting results and communicating results to the patient/family/caregiver, or care coordinator.         Signature: Javy Su NP         [1]   Current Outpatient Medications:     albuterol (PROVENTIL) 2.5 mg /3 mL (0.083 %) nebulizer solution, Take 3 mLs (2.5 mg total) by nebulization every 4 to 6 hours as needed for Wheezing or Shortness of Breath. (Patient not taking: Reported on 3/31/2025), Disp: 360 mL, Rfl:  11    albuterol (PROVENTIL/VENTOLIN HFA) 90 mcg/actuation inhaler, Inhale 2 puffs into the lungs every 4 (four) hours as needed for Wheezing or Shortness of Breath. (Patient not taking: Reported on 3/31/2025), Disp: 8.5 g, Rfl: 11    apixaban (ELIQUIS) 5 mg Tab, Take 1 tablet (5 mg total) by mouth 2 (two) times daily., Disp: 180 tablet, Rfl: 1    aspirin (ECOTRIN) 81 MG EC tablet, Take 1 tablet by mouth Daily. , Disp: , Rfl:     blood-glucose sensor (DEXCOM G7 SENSOR) [place], Use as directed for continuous glucose monitoring; Change every 10 days., Disp: 3 each, Rfl: 0    [Paused] calcitRIOL (ROCALTROL) 0.25 MCG Cap, Take 1 capsule (0.25 mcg total) by mouth once daily., Disp: 30 capsule, Rfl: 11    famotidine (PEPCID) 20 MG tablet, TAKE ONE TABLET BY MOUTH EVERY EVENING, Disp: 30 tablet, Rfl: 11    febuxostat (ULORIC) 40 mg Tab, Take 1 tablet (40 mg total) by mouth once daily., Disp: 30 tablet, Rfl: 1    ferrous sulfate (FEROSUL) 325 mg (65 mg iron) Tab tablet, Take 1 tablet (325 mg total) by mouth daily with breakfast., Disp: 30 tablet, Rfl: 11    fish oil-omega-3 fatty acids 300-1,000 mg capsule, Take 1 g by mouth once daily., Disp: , Rfl:     furosemide (LASIX) 40 MG tablet, Take 1 tablet (40 mg total) by mouth once daily., Disp: 30 tablet, Rfl: 11    HYDROcodone-acetaminophen (NORCO) 5-325 mg per tablet, Take 1 tablet by mouth every 6 (six) hours as needed for Pain. (Patient not taking: Reported on 3/31/2025), Disp: 20 tablet, Rfl: 0    insulin aspart U-100 (NOVOLOG FLEXPEN U-100 INSULIN) 100 unit/mL (3 mL) InPn pen, Inject 25 Units into the skin 3 (three) times daily with meals., Disp: 30 mL, Rfl: 0    insulin aspart U-100 (NOVOLOG) 100 unit/mL (3 mL) InPn pen, Inject 0-10 Units into the skin before meals and at bedtime as needed (Hyperglycemia). **MODERATE CORRECTION DOSE** Blood Glucose mg/dL                  Pre-meal                2200 151-200                2 units                    1 unit 201-250            "     4 units                    2 units 251-300                6 units                    3 units 301-350                8 units                    4 units >350                     10 units                  5 units Administer subcutaneously if needed at times designated by monitoring schedule. DO NOT HOLD correction dose insulin for patients who are  NPO.  "HIGH ALERT MEDICATION" - Administer with meals or TF/TPN., Disp: , Rfl:     insulin glargine U-100, Lantus, (LANTUS SOLOSTAR U-100 INSULIN) 100 unit/mL (3 mL) InPn pen, Inject 15 Units into the skin 2 (two) times daily., Disp: 30 mL, Rfl: 1    metoprolol succinate (TOPROL-XL) 25 MG 24 hr tablet, Take 1 tablet (25 mg total) by mouth once daily., Disp: 90 tablet, Rfl: 3    multivitamin (THERAGRAN) tablet, Take 1 tablet by mouth once daily., Disp: , Rfl:     mycophenolate (CELLCEPT) 250 mg Cap, Take 2 capsules (500 mg total) by mouth 2 (two) times daily., Disp: 120 capsule, Rfl: 11    potassium chloride SA (K-DUR,KLOR-CON) 20 MEQ tablet, Take 1 tablet (20 mEq total) by mouth 2 (two) times daily., Disp: 60 tablet, Rfl: 11    predniSONE (DELTASONE) 5 MG tablet, Take 1 tablet (5 mg total) by mouth once daily., Disp: 30 tablet, Rfl: 11    semaglutide (OZEMPIC) 2 mg/dose (8 mg/3 mL) PnIj, Inject 2 mg into the skin every 7 days., Disp: 6 mL, Rfl: 1    sodium bicarbonate 650 MG tablet, Take 1 tablet (650 mg total) by mouth once daily., Disp: 30 tablet, Rfl: 11    tacrolimus (PROGRAF) 1 MG Cap, Take 3 capsules (3 mg total) by mouth every morning AND 3 capsules (3 mg total) every evening. Until followup with nephrology., Disp: 180 capsule, Rfl: 5    triamcinolone acetonide 0.1% (KENALOG) 0.1 % ointment, Apply topically 2 (two) times daily. Use on bilateral lower legs., Disp: 454 g, Rfl: 1    Current Facility-Administered Medications:     acetaminophen tablet 650 mg, 650 mg, Oral, Once PRN, Sal Lopez MD    "

## 2025-07-25 VITALS
OXYGEN SATURATION: 97 % | HEART RATE: 78 BPM | DIASTOLIC BLOOD PRESSURE: 70 MMHG | SYSTOLIC BLOOD PRESSURE: 122 MMHG | RESPIRATION RATE: 18 BRPM

## 2025-07-25 PROBLEM — E86.1 HYPOTENSION DUE TO HYPOVOLEMIA: Status: RESOLVED | Noted: 2022-12-07 | Resolved: 2025-07-25
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
## 2025-07-25 PROBLEM — R31.0 GROSS HEMATURIA: Status: RESOLVED | Noted: 2025-01-13 | Resolved: 2025-07-25

## 2025-07-25 PROBLEM — I82.409 DEEP VEIN THROMBOSIS (DVT) OF LOWER EXTREMITY: Status: RESOLVED | Noted: 2019-07-24 | Resolved: 2025-07-25

## 2025-07-25 PROBLEM — S09.90XA HEAD INJURY: Status: RESOLVED | Noted: 2022-12-07 | Resolved: 2025-07-25

## 2025-07-25 PROBLEM — Z01.810 PREOP CARDIOVASCULAR EXAM: Status: RESOLVED | Noted: 2023-05-21 | Resolved: 2025-07-25

## 2025-07-25 PROBLEM — E83.42 HYPOMAGNESEMIA: Status: RESOLVED | Noted: 2025-04-02 | Resolved: 2025-07-25

## 2025-07-25 PROBLEM — N39.0 UTI (URINARY TRACT INFECTION): Status: RESOLVED | Noted: 2025-02-27 | Resolved: 2025-07-25

## 2025-07-25 PROBLEM — E87.5 HYPERKALEMIA: Status: RESOLVED | Noted: 2025-01-13 | Resolved: 2025-07-25

## 2025-07-25 PROBLEM — R55 SYNCOPE AND COLLAPSE: Status: RESOLVED | Noted: 2022-12-08 | Resolved: 2025-07-25

## 2025-07-25 PROBLEM — E86.0 DEHYDRATION: Status: RESOLVED | Noted: 2022-12-08 | Resolved: 2025-07-25

## 2025-07-25 PROBLEM — G93.41 METABOLIC ENCEPHALOPATHY: Status: RESOLVED | Noted: 2025-04-07 | Resolved: 2025-07-25

## 2025-07-25 PROBLEM — L03.119 CELLULITIS OF EXTREMITY: Status: RESOLVED | Noted: 2022-03-23 | Resolved: 2025-07-25

## 2025-07-25 PROBLEM — R42 DIZZINESS: Status: RESOLVED | Noted: 2022-12-07 | Resolved: 2025-07-25

## 2025-07-25 PROBLEM — I50.23 ACUTE ON CHRONIC SYSTOLIC CONGESTIVE HEART FAILURE: Status: RESOLVED | Noted: 2024-02-22 | Resolved: 2025-07-25

## 2025-07-25 PROBLEM — R41.0 DELIRIUM: Status: RESOLVED | Noted: 2025-04-11 | Resolved: 2025-07-25

## 2025-07-25 PROBLEM — I95.1 ORTHOSTATIC HYPOTENSION: Status: RESOLVED | Noted: 2022-12-08 | Resolved: 2025-07-25

## 2025-07-29 DIAGNOSIS — E11.649 UNCONTROLLED TYPE 2 DIABETES MELLITUS WITH HYPOGLYCEMIA WITHOUT COMA: ICD-10-CM

## 2025-07-29 DIAGNOSIS — E11.21 TYPE 2 DIABETES MELLITUS WITH DIABETIC NEPHROPATHY, UNSPECIFIED WHETHER LONG TERM INSULIN USE: ICD-10-CM

## 2025-07-29 RX ORDER — BLOOD-GLUCOSE SENSOR
1 EACH MISCELLANEOUS
Qty: 3 EACH | Refills: 0 | Status: CANCELLED | OUTPATIENT
Start: 2025-07-29 | End: 2026-07-29

## 2025-07-29 RX ORDER — BLOOD-GLUCOSE SENSOR
1 EACH MISCELLANEOUS
Qty: 3 EACH | Refills: 0 | OUTPATIENT
Start: 2025-07-29 | End: 2026-07-29

## 2025-07-31 DIAGNOSIS — E11.649 UNCONTROLLED TYPE 2 DIABETES MELLITUS WITH HYPOGLYCEMIA WITHOUT COMA: ICD-10-CM

## 2025-07-31 DIAGNOSIS — E11.21 TYPE 2 DIABETES MELLITUS WITH DIABETIC NEPHROPATHY, UNSPECIFIED WHETHER LONG TERM INSULIN USE: ICD-10-CM

## 2025-08-01 RX ORDER — BLOOD-GLUCOSE SENSOR
1 EACH MISCELLANEOUS
Qty: 3 EACH | Refills: 0 | Status: SHIPPED | OUTPATIENT
Start: 2025-08-01 | End: 2026-08-01

## 2025-08-04 DIAGNOSIS — Z94.0 KIDNEY REPLACED BY TRANSPLANT: ICD-10-CM

## 2025-08-04 RX ORDER — KETOCONAZOLE 200 MG/1
100 TABLET ORAL DAILY
Qty: 45 TABLET | Refills: 3 | Status: SHIPPED | OUTPATIENT
Start: 2025-08-04

## 2025-08-04 RX ORDER — CALCITRIOL 0.25 UG/1
0.25 CAPSULE ORAL DAILY
Qty: 30 CAPSULE | Refills: 11 | Status: CANCELLED | OUTPATIENT
Start: 2025-08-04 | End: 2026-08-04

## 2025-08-04 RX ORDER — PREDNISONE 5 MG/1
5 TABLET ORAL DAILY
Qty: 30 TABLET | Refills: 11 | Status: SHIPPED | OUTPATIENT
Start: 2025-08-04 | End: 2026-08-04

## 2025-08-07 ENCOUNTER — DOCUMENT SCAN (OUTPATIENT)
Dept: HOME HEALTH SERVICES | Facility: HOSPITAL | Age: 70
End: 2025-08-07

## 2025-08-07 ENCOUNTER — DOCUMENT SCAN (OUTPATIENT)
Dept: HOME HEALTH SERVICES | Facility: HOSPITAL | Age: 70
End: 2025-08-07
Payer: COMMERCIAL

## 2025-08-13 ENCOUNTER — PATIENT MESSAGE (OUTPATIENT)
Dept: INTERNAL MEDICINE | Facility: CLINIC | Age: 70
End: 2025-08-13
Payer: COMMERCIAL

## 2025-08-13 RX ORDER — CALCITRIOL 0.25 UG/1
0.25 CAPSULE ORAL DAILY
Qty: 30 CAPSULE | Refills: 11 | Status: SHIPPED | OUTPATIENT
Start: 2025-08-13 | End: 2026-08-13

## 2025-08-26 ENCOUNTER — LAB VISIT (OUTPATIENT)
Dept: LAB | Facility: HOSPITAL | Age: 70
End: 2025-08-26
Attending: FAMILY MEDICINE
Payer: COMMERCIAL

## 2025-08-26 DIAGNOSIS — I50.42 CHRONIC COMBINED SYSTOLIC AND DIASTOLIC CONGESTIVE HEART FAILURE: ICD-10-CM

## 2025-08-27 RX ORDER — METOPROLOL SUCCINATE 25 MG/1
25 TABLET, EXTENDED RELEASE ORAL DAILY
Qty: 90 TABLET | Refills: 3 | Status: SHIPPED | OUTPATIENT
Start: 2025-08-27 | End: 2026-08-12

## 2025-08-28 ENCOUNTER — LAB VISIT (OUTPATIENT)
Dept: LAB | Facility: HOSPITAL | Age: 70
End: 2025-08-28
Attending: INTERNAL MEDICINE
Payer: COMMERCIAL

## 2025-08-28 DIAGNOSIS — E87.5 HYPERKALEMIA: ICD-10-CM

## 2025-08-28 DIAGNOSIS — N25.81 SECONDARY HYPERPARATHYROIDISM OF RENAL ORIGIN: ICD-10-CM

## 2025-08-28 LAB
BILIRUB UR QL STRIP.AUTO: NEGATIVE
CLARITY UR: CLEAR
COLOR UR AUTO: YELLOW
CREAT UR-MCNC: 109 MG/DL (ref 23–375)
GLUCOSE UR QL STRIP: NEGATIVE
HGB UR QL STRIP: NEGATIVE
KETONES UR QL STRIP: NEGATIVE
LEUKOCYTE ESTERASE UR QL STRIP: NEGATIVE
NITRITE UR QL STRIP: NEGATIVE
PH UR STRIP: 5 [PH]
PROT UR QL STRIP: NEGATIVE
PROT UR-MCNC: <7 MG/DL
PROT/CREAT UR: NORMAL MG/G{CREAT}
SP GR UR STRIP: 1.01
UROBILINOGEN UR STRIP-ACNC: NEGATIVE EU/DL

## 2025-08-28 PROCEDURE — 84156 ASSAY OF PROTEIN URINE: CPT

## 2025-08-28 PROCEDURE — 81003 URINALYSIS AUTO W/O SCOPE: CPT

## 2025-09-02 DIAGNOSIS — E11.21 TYPE 2 DIABETES MELLITUS WITH DIABETIC NEPHROPATHY, UNSPECIFIED WHETHER LONG TERM INSULIN USE: ICD-10-CM

## 2025-09-02 DIAGNOSIS — E11.649 UNCONTROLLED TYPE 2 DIABETES MELLITUS WITH HYPOGLYCEMIA WITHOUT COMA: ICD-10-CM

## 2025-09-03 RX ORDER — BLOOD-GLUCOSE SENSOR
1 EACH MISCELLANEOUS
Qty: 3 EACH | Refills: 0 | OUTPATIENT
Start: 2025-09-03 | End: 2026-09-03

## (undated) DEVICE — DRESSING XEROFORM NONADH 1X8IN

## (undated) DEVICE — GLOVE BIOGEL SZ 8 1/2

## (undated) DEVICE — GOWN POLY REINF BRTH SLV XL

## (undated) DEVICE — DRAPE U SPLIT SHEET 54X76IN

## (undated) DEVICE — ALCOHOL 70% ANTISEPTIC ISO 4OZ

## (undated) DEVICE — BANDAGE ESMARK ELASTIC ST 4X9

## (undated) DEVICE — DRAPE THREE-QTR REINF 53X77IN

## (undated) DEVICE — NDL ECLIPSE SAF REG 25GX1.5IN

## (undated) DEVICE — PAD ABDOMINAL STERILE 8X10IN

## (undated) DEVICE — COVER LIGHT HANDLE 80/CA

## (undated) DEVICE — SYR LUER LOCK STERILE 10ML

## (undated) DEVICE — UNDERGLOVES BIOGEL PI SIZE 8.5

## (undated) DEVICE — SOL NACL IRR 1000ML BTL

## (undated) DEVICE — GOWN NONREINF SET-IN SLV 2XL

## (undated) DEVICE — DRAPE HAND STERILE

## (undated) DEVICE — SUT 4-0 ETHILON 18 PS-2

## (undated) DEVICE — SPONGE COTTON TRAY 4X4IN

## (undated) DEVICE — PACK BASIC SETUP SC BR

## (undated) DEVICE — APPLICATOR CHLORAPREP ORN 26ML

## (undated) DEVICE — UNDERGLOVES BIOGEL PI SIZE 7.5

## (undated) DEVICE — TOWEL OR DISP STRL BLUE 4/PK

## (undated) DEVICE — HEADREST ROUND DISP FOAM 9IN

## (undated) DEVICE — PAD CAST SPECIALIST STRL 3

## (undated) DEVICE — SCRUB DYNA-HEX LIQ 4% CHG 4OZ

## (undated) DEVICE — TOURNIQUET SB QC DP 18X4IN

## (undated) DEVICE — BANDAGE ELASTIC 3X5 VELCRO ST